# Patient Record
Sex: MALE | Race: WHITE | NOT HISPANIC OR LATINO | URBAN - METROPOLITAN AREA
[De-identification: names, ages, dates, MRNs, and addresses within clinical notes are randomized per-mention and may not be internally consistent; named-entity substitution may affect disease eponyms.]

---

## 2018-11-07 ENCOUNTER — INPATIENT (INPATIENT)
Facility: HOSPITAL | Age: 63
LOS: 0 days | Discharge: HOME | End: 2018-11-08
Attending: INTERNAL MEDICINE | Admitting: INTERNAL MEDICINE

## 2018-11-07 VITALS
OXYGEN SATURATION: 95 % | HEART RATE: 82 BPM | DIASTOLIC BLOOD PRESSURE: 101 MMHG | RESPIRATION RATE: 20 BRPM | HEIGHT: 63 IN | TEMPERATURE: 98 F | SYSTOLIC BLOOD PRESSURE: 150 MMHG | WEIGHT: 203.93 LBS

## 2018-11-07 DIAGNOSIS — E11.59 TYPE 2 DIABETES MELLITUS WITH OTHER CIRCULATORY COMPLICATIONS: ICD-10-CM

## 2018-11-07 DIAGNOSIS — Z95.5 PRESENCE OF CORONARY ANGIOPLASTY IMPLANT AND GRAFT: Chronic | ICD-10-CM

## 2018-11-07 DIAGNOSIS — I50.9 HEART FAILURE, UNSPECIFIED: ICD-10-CM

## 2018-11-07 DIAGNOSIS — J44.1 CHRONIC OBSTRUCTIVE PULMONARY DISEASE WITH (ACUTE) EXACERBATION: ICD-10-CM

## 2018-11-07 DIAGNOSIS — I25.10 ATHEROSCLEROTIC HEART DISEASE OF NATIVE CORONARY ARTERY WITHOUT ANGINA PECTORIS: ICD-10-CM

## 2018-11-07 DIAGNOSIS — R06.00 DYSPNEA, UNSPECIFIED: ICD-10-CM

## 2018-11-07 LAB
ALBUMIN SERPL ELPH-MCNC: 4.1 G/DL — SIGNIFICANT CHANGE UP (ref 3.5–5.2)
ALP SERPL-CCNC: 42 U/L — SIGNIFICANT CHANGE UP (ref 30–115)
ALT FLD-CCNC: 39 U/L — SIGNIFICANT CHANGE UP (ref 0–41)
ANION GAP SERPL CALC-SCNC: 8 MMOL/L — SIGNIFICANT CHANGE UP (ref 7–14)
AST SERPL-CCNC: 29 U/L — SIGNIFICANT CHANGE UP (ref 0–41)
BASOPHILS # BLD AUTO: 0.07 K/UL — SIGNIFICANT CHANGE UP (ref 0–0.2)
BASOPHILS NFR BLD AUTO: 0.8 % — SIGNIFICANT CHANGE UP (ref 0–1)
BILIRUB SERPL-MCNC: 0.3 MG/DL — SIGNIFICANT CHANGE UP (ref 0.2–1.2)
BUN SERPL-MCNC: 21 MG/DL — HIGH (ref 10–20)
CALCIUM SERPL-MCNC: 9.4 MG/DL — SIGNIFICANT CHANGE UP (ref 8.5–10.1)
CHLORIDE SERPL-SCNC: 103 MMOL/L — SIGNIFICANT CHANGE UP (ref 98–110)
CO2 SERPL-SCNC: 29 MMOL/L — SIGNIFICANT CHANGE UP (ref 17–32)
CREAT SERPL-MCNC: 1.5 MG/DL — SIGNIFICANT CHANGE UP (ref 0.7–1.5)
EOSINOPHIL # BLD AUTO: 0.26 K/UL — SIGNIFICANT CHANGE UP (ref 0–0.7)
EOSINOPHIL NFR BLD AUTO: 2.9 % — SIGNIFICANT CHANGE UP (ref 0–8)
GLUCOSE BLDC GLUCOMTR-MCNC: 292 MG/DL — HIGH (ref 70–99)
GLUCOSE SERPL-MCNC: 325 MG/DL — HIGH (ref 70–99)
HCT VFR BLD CALC: 44 % — SIGNIFICANT CHANGE UP (ref 42–52)
HGB BLD-MCNC: 14.9 G/DL — SIGNIFICANT CHANGE UP (ref 14–18)
IMM GRANULOCYTES NFR BLD AUTO: 0.8 % — HIGH (ref 0.1–0.3)
LYMPHOCYTES # BLD AUTO: 1.82 K/UL — SIGNIFICANT CHANGE UP (ref 1.2–3.4)
LYMPHOCYTES # BLD AUTO: 20 % — LOW (ref 20.5–51.1)
MCHC RBC-ENTMCNC: 28.9 PG — SIGNIFICANT CHANGE UP (ref 27–31)
MCHC RBC-ENTMCNC: 33.9 G/DL — SIGNIFICANT CHANGE UP (ref 32–37)
MCV RBC AUTO: 85.3 FL — SIGNIFICANT CHANGE UP (ref 80–94)
MONOCYTES # BLD AUTO: 0.47 K/UL — SIGNIFICANT CHANGE UP (ref 0.1–0.6)
MONOCYTES NFR BLD AUTO: 5.2 % — SIGNIFICANT CHANGE UP (ref 1.7–9.3)
NEUTROPHILS # BLD AUTO: 6.42 K/UL — SIGNIFICANT CHANGE UP (ref 1.4–6.5)
NEUTROPHILS NFR BLD AUTO: 70.3 % — SIGNIFICANT CHANGE UP (ref 42.2–75.2)
NRBC # BLD: 0 /100 WBCS — SIGNIFICANT CHANGE UP (ref 0–0)
NT-PROBNP SERPL-SCNC: 786 PG/ML — HIGH (ref 0–300)
PLATELET # BLD AUTO: 157 K/UL — SIGNIFICANT CHANGE UP (ref 130–400)
POTASSIUM SERPL-MCNC: 5.2 MMOL/L — HIGH (ref 3.5–5)
POTASSIUM SERPL-SCNC: 5.2 MMOL/L — HIGH (ref 3.5–5)
PROT SERPL-MCNC: 6.7 G/DL — SIGNIFICANT CHANGE UP (ref 6–8)
RBC # BLD: 5.16 M/UL — SIGNIFICANT CHANGE UP (ref 4.7–6.1)
SODIUM SERPL-SCNC: 140 MMOL/L — SIGNIFICANT CHANGE UP (ref 135–146)
TROPONIN T SERPL-MCNC: 0.01 NG/ML — SIGNIFICANT CHANGE UP
WBC # BLD: 9.11 K/UL — SIGNIFICANT CHANGE UP (ref 4.8–10.8)
WBC # FLD AUTO: 9.11 K/UL — SIGNIFICANT CHANGE UP (ref 4.8–10.8)

## 2018-11-07 RX ORDER — AZITHROMYCIN 500 MG/1
500 TABLET, FILM COATED ORAL EVERY 24 HOURS
Qty: 0 | Refills: 0 | Status: DISCONTINUED | OUTPATIENT
Start: 2018-11-08 | End: 2018-11-08

## 2018-11-07 RX ORDER — TIOTROPIUM BROMIDE 18 UG/1
1 CAPSULE ORAL; RESPIRATORY (INHALATION) DAILY
Qty: 0 | Refills: 0 | Status: DISCONTINUED | OUTPATIENT
Start: 2018-11-07 | End: 2018-11-08

## 2018-11-07 RX ORDER — NICOTINE POLACRILEX 2 MG
1 GUM BUCCAL DAILY
Qty: 0 | Refills: 0 | Status: DISCONTINUED | OUTPATIENT
Start: 2018-11-07 | End: 2018-11-08

## 2018-11-07 RX ORDER — IPRATROPIUM/ALBUTEROL SULFATE 18-103MCG
3 AEROSOL WITH ADAPTER (GRAM) INHALATION ONCE
Qty: 0 | Refills: 0 | Status: COMPLETED | OUTPATIENT
Start: 2018-11-07 | End: 2018-11-07

## 2018-11-07 RX ORDER — IPRATROPIUM/ALBUTEROL SULFATE 18-103MCG
3 AEROSOL WITH ADAPTER (GRAM) INHALATION EVERY 4 HOURS
Qty: 0 | Refills: 0 | Status: DISCONTINUED | OUTPATIENT
Start: 2018-11-07 | End: 2018-11-08

## 2018-11-07 RX ORDER — ZOLPIDEM TARTRATE 10 MG/1
5 TABLET ORAL AT BEDTIME
Qty: 0 | Refills: 0 | Status: DISCONTINUED | OUTPATIENT
Start: 2018-11-07 | End: 2018-11-08

## 2018-11-07 RX ORDER — OXYCODONE AND ACETAMINOPHEN 5; 325 MG/1; MG/1
1 TABLET ORAL EVERY 4 HOURS
Qty: 0 | Refills: 0 | Status: DISCONTINUED | OUTPATIENT
Start: 2018-11-07 | End: 2018-11-08

## 2018-11-07 RX ORDER — METOPROLOL TARTRATE 50 MG
200 TABLET ORAL DAILY
Qty: 0 | Refills: 0 | Status: DISCONTINUED | OUTPATIENT
Start: 2018-11-07 | End: 2018-11-08

## 2018-11-07 RX ORDER — BUDESONIDE AND FORMOTEROL FUMARATE DIHYDRATE 160; 4.5 UG/1; UG/1
2 AEROSOL RESPIRATORY (INHALATION)
Qty: 0 | Refills: 0 | Status: DISCONTINUED | OUTPATIENT
Start: 2018-11-07 | End: 2018-11-08

## 2018-11-07 RX ORDER — LOSARTAN POTASSIUM 100 MG/1
100 TABLET, FILM COATED ORAL DAILY
Qty: 0 | Refills: 0 | Status: DISCONTINUED | OUTPATIENT
Start: 2018-11-07 | End: 2018-11-08

## 2018-11-07 RX ORDER — AZITHROMYCIN 500 MG/1
500 TABLET, FILM COATED ORAL ONCE
Qty: 0 | Refills: 0 | Status: COMPLETED | OUTPATIENT
Start: 2018-11-07 | End: 2018-11-07

## 2018-11-07 RX ORDER — ATORVASTATIN CALCIUM 80 MG/1
40 TABLET, FILM COATED ORAL AT BEDTIME
Qty: 0 | Refills: 0 | Status: DISCONTINUED | OUTPATIENT
Start: 2018-11-07 | End: 2018-11-08

## 2018-11-07 RX ORDER — IPRATROPIUM/ALBUTEROL SULFATE 18-103MCG
3 AEROSOL WITH ADAPTER (GRAM) INHALATION EVERY 6 HOURS
Qty: 0 | Refills: 0 | Status: DISCONTINUED | OUTPATIENT
Start: 2018-11-07 | End: 2018-11-07

## 2018-11-07 RX ORDER — CLOPIDOGREL BISULFATE 75 MG/1
75 TABLET, FILM COATED ORAL DAILY
Qty: 0 | Refills: 0 | Status: DISCONTINUED | OUTPATIENT
Start: 2018-11-07 | End: 2018-11-08

## 2018-11-07 RX ORDER — AZITHROMYCIN 500 MG/1
TABLET, FILM COATED ORAL
Qty: 0 | Refills: 0 | Status: DISCONTINUED | OUTPATIENT
Start: 2018-11-07 | End: 2018-11-08

## 2018-11-07 RX ORDER — FUROSEMIDE 40 MG
40 TABLET ORAL DAILY
Qty: 0 | Refills: 0 | Status: DISCONTINUED | OUTPATIENT
Start: 2018-11-07 | End: 2018-11-08

## 2018-11-07 RX ADMIN — Medication 3 MILLILITER(S): at 17:44

## 2018-11-07 RX ADMIN — Medication 40 MILLIGRAM(S): at 23:35

## 2018-11-07 RX ADMIN — AZITHROMYCIN 255 MILLIGRAM(S): 500 TABLET, FILM COATED ORAL at 19:53

## 2018-11-07 RX ADMIN — Medication 125 MILLIGRAM(S): at 16:46

## 2018-11-07 RX ADMIN — Medication 3 MILLILITER(S): at 15:00

## 2018-11-07 RX ADMIN — ATORVASTATIN CALCIUM 40 MILLIGRAM(S): 80 TABLET, FILM COATED ORAL at 23:34

## 2018-11-07 RX ADMIN — BUDESONIDE AND FORMOTEROL FUMARATE DIHYDRATE 2 PUFF(S): 160; 4.5 AEROSOL RESPIRATORY (INHALATION) at 23:35

## 2018-11-07 RX ADMIN — Medication 3 MILLILITER(S): at 15:35

## 2018-11-07 NOTE — ED PROVIDER NOTE - PROGRESS NOTE DETAILS
Patient resting comfortably, duo neb treatment currently being given. Recheck - patient has completed first duo neb and reports improvement in his dyspnea. On repeat exam he has expiratory wheezes bilaterally at the bases. Additional duo neb and solumedrol ordered. Recheck - Patient is resting comfortably in ED bed, finished second duo neb - reports that he is no longer short of breath. Lungs are clear to auscultation bilaterally with no wheezing, rales, or rhonchi. Labs pending at this time. Recheck - family now at bedside. Patient has increased work of breathing again but can still speak in full sentences. Lungs are clear to auscultation bilaterally. SpO2 ranging from 91-95% on room air. Additional duo neb ordered, O2 placed. MD at bedside. Discussed the pateint's case with Dr. Hdez. He will admit the patient under his care. PA Fellow note reviewed and agree, Patient admitted

## 2018-11-07 NOTE — H&P ADULT - PROBLEM SELECTOR PLAN 3
Lasix 40iv daily  BNP slightly elevated, morbidly obese so likely falsely low. Lungs without crackles

## 2018-11-07 NOTE — H&P ADULT - NSHPSOCIALHISTORY_GEN_ALL_CORE
Soc: Active smoker 1PPD since 1996. Social drinker    Family hx : no known hx of heart failure in family

## 2018-11-07 NOTE — PHARMACOTHERAPY INTERVENTION NOTE - COMMENTS
DR Hdez aware of allergy to sulfa. Patient on lasix from home not allergic. continuation of home meds as per MD

## 2018-11-07 NOTE — ED PROVIDER NOTE - MEDICAL DECISION MAKING DETAILS
63yM CHF active smoker presents with SOB (no CP, no fever, no change in cough). Minimal volume overload on exam.  Sig inc WOB/accessory muscle use.  Pt started wheezing after duoneb, will treat as COPD with steroids and duoneb.  Pt requiring additional nebs to treat recurrent inc WOB, will admit for COPD exacerbation.

## 2018-11-07 NOTE — ED ADULT TRIAGE NOTE - CHIEF COMPLAINT QUOTE
As per patient "I've short of breath for three weeks, it hurts when I breathe in deep, I am coughing and I am wheezing "

## 2018-11-07 NOTE — ED PROVIDER NOTE - PHYSICAL EXAMINATION
General: Well-developed, obese, in no acute distress. Pleasant and interactive throughout the exam.  HEENT: Normocephalic, atraumatic. Mucous membranes moist, oropharynx nonerythematous without exudate. Uvula midline. TM's clear bilaterally.  Neck: supple, no cervical adenopathy.  Cardio: Regular rate and rhythm, S1 and S2 present. No murmurs, rubs, or gallops. 1+ pitting edema to lower extremities bilaterally. 2+ radial pulses bilaterally. No chest wall tenderness.  Resp: Normal effort, speaking in full sentences without difficulty. Clear to auscultation bilaterally without wheezes, rales, or rhonchi.  GI: Normal bowel sounds. Abdomen protuberant, but soft and non-distended. Nontender in all four quadrants.  Skin: Warm, dry, no rashes. Capillary refill <2 seconds.  Neuro: A&Ox3. Speech clear. General: Well-developed, obese, in no acute distress. Pleasant and interactive throughout the exam.  HEENT: Normocephalic, atraumatic. Mucous membranes moist, oropharynx nonerythematous without exudate. TM's clear bilaterally.  Neck: supple, no cervical adenopathy.  Cardio: Regular rate and rhythm, S1 and S2 present. No murmurs, rubs, or gallops. 1+ pitting edema to lower extremities bilaterally. 2+ radial pulses bilaterally. No chest wall tenderness.  Resp: Normal effort, speaking in full sentences without difficulty. Clear to auscultation bilaterally without wheezes, rales, or rhonchi.  GI: Normal bowel sounds. Abdomen protuberant, but soft and non-distended. Nontender in all four quadrants.  Skin: Warm, dry, no rashes. Capillary refill <2 seconds.  Neuro: A&Ox3. Speech clear.

## 2018-11-07 NOTE — ED PROVIDER NOTE - NS ED ROS FT
Constitutional: (-) fevers, (-) chills, (-) fatigue, (-) unintentional weight loss, (-) appetite change  ENT: (+) congestion, (-) ear pain, (-) rhinorrhea, (-) sore throat  Neck: (-) neck pain, (-) lymphadenopthy  Cardio: (+) chest pain, (-) palpitations, (-) edema  Resp: (+) cough, (+) sputum, (+) shortness of breath, (+) wheezing, (-) hemoptysis  GI: (-) nausea/vomiting, (-) diarrhea/constipation, (-) abdominal pain  MSK: (-) back pain, (-) joint pain, (-) joint swelling, (-) joint stiffness  Skin: (-) rashes, (-) wounds  Neuro: (-) headache, (-) syncope, (-) numbness, (-) weakness Constitutional: (-) fevers, (-) chills, (-) fatigue, (-) unintentional weight loss, (-) appetite change  ENT: (+) congestion, (-) ear pain, (-) rhinorrhea, (-) sore throat  Neck: (-) neck pain, (-) lymphadenopthy  Cardio: (+) chest pain, (-) palpitations, (-) edema  Resp: (+) cough, (+) sputum, (+) shortness of breath, (+) wheezing, (-) hemoptysis  GI: (-) nausea/vomiting, (-) diarrhea/constipation, (-) abdominal pain  : (-) dysuria, (-) frequency  MSK: (-) back pain, (-) joint pain, (-) joint swelling, (-) joint stiffness  Skin: (-) rashes, (-) wounds  Neuro: (-) headache, (-) syncope, (-) numbness, (-) weakness

## 2018-11-07 NOTE — ED PROVIDER NOTE - CARE PLAN
Principal Discharge DX:	Dyspnea  Secondary Diagnosis:	CHF (congestive heart failure) Principal Discharge DX:	Dyspnea  Secondary Diagnosis:	COPD exacerbation

## 2018-11-07 NOTE — H&P ADULT - NSHPLABSRESULTS_GEN_ALL_CORE
T(F): , Max: 97.6 (11-07-18 @ 14:27)  HR: 82 (11-07-18 @ 14:27) (82 - 82)  BP: 150/101 (11-07-18 @ 14:27)  RR: 20 (11-07-18 @ 14:27)  SpO2: 95% (11-07-18 @ 14:27)92.5  General: No apparent distress  Cardiovascular: S1, S2  Gastrointestinal: Soft, Non-tender, Non-distended, obese  Respiratory: Minimal wheeze. No crackles noted  Musculoskeletal: Moves all extremities  Lymphatic: No edema  Neurologic: No gross motor deficit  Dermatologic: Skin dry

## 2018-11-07 NOTE — ED PROVIDER NOTE - ATTENDING CONTRIBUTION TO CARE
This is a 63yM active smoker PTSD hx ?CHF who presents for worsened SOB (now persistent compared to intermittent at baseline). No fever, no change in cough, no sig inc edema.  Pt tachypneic, working to breathe on arrival.  Denies CP.    VS HR 82 /101 O2 sat 95% RA  CONSTITUTIONAL: well developed; well nourished; well appearing in no acute distress  HEAD: normocephalic; atraumatic  EYES: no conjunctival injection, no scleral icterus  ENT: no nasal discharge; airway clear.  NECK: supple; non tender. + full passive ROM in all directions  CARD: S1, S2 normal; no murmurs, gallops, or rubs. Regular rate and rhythm  RESP: b/l breath sounds w/ accessory muscle use, no sig wheezing  ABD: soft; non-distended; non-tender. No rebound, no guarding, no pulsatile abdominal mass  EXT: moving all extremities spontaneously, normal ROM. No clubbing, cyanosis or edema  SKIN: warm and dry, no lesions noted  NEURO: alert, oriented, CN II-XII grossly intact, motor and sensory grossly intact, speech nonslurred, no focal deficits. GCS 15  PSYCH: calm, cooperative, appropriate, good eye contact, logical thought process, no apparent danger to self or others    EKG  labs  CXR  CHF vs likely COPD --> duoneb w/ improved WOB, now wheezing, will treat as COPD, give steroids    on repeat assessment ~1-2 hours, pt feeling much better but starting to get more SOB.  Giving more nebs, will need admission (especially as he does not regularly follow with a PCP and family is concerned that he will not follow up at all).

## 2018-11-07 NOTE — H&P ADULT - HISTORY OF PRESENT ILLNESS
62 y/o F P/W 3-4 weeks of SOB and LESLIE episodic but progressively worsening. Pt states at baseline on a good day, he can walk up a few steps before getting SOB and having to rest. Pt states that the last few days he has been SOB at rest. Pt states this is how he felt before each of his "13 stents". Denies fevers or chills. C/O chronic cough, unchanged but does admit to new nasal discharge nasal fullness worsenend when laying down to sleep. Does not have pulmonologist. Cardiology Dr Aguilar.

## 2018-11-07 NOTE — ED PROVIDER NOTE - OBJECTIVE STATEMENT
63 year old male with a history of CHF, DM, CAD with 13 stents presents to the ED with dyspnea. Patient states that he has been experiencing increasing shortness of breath for the last 3 weeks, which became notably worse today when he was trying to take a nap. He states that he felt so short of breath he was unable to lay flat. Patient also reports intermittent sharp mid-chest pain and productive cough with his shortness of breath. He reports taking Mucinex with no relief of his symptoms. Denies fevers, chills, recent travel, back pain, abdominal pain, N/V, leg swelling, syncope, diaphoresis, or weakness. Patient states that he is currently in-between primary care providers. 63 year old male with a history of CHF, DM, CAD with 13 stents presents to the ED with dyspnea. Patient states that he has been experiencing increasing shortness of breath for the last 3 weeks, which became notably worse today when he was trying to take a nap. He states that he felt so short of breath he was unable to lay flat. Patient also reports intermittent sharp mid-chest pain and productive cough with his shortness of breath. He reports taking Mucinex with no relief of his symptoms. Denies fevers, chills, recent travel or illness, back pain, abdominal pain, N/V, leg swelling, syncope, diaphoresis, or weakness. Patient states that he is currently in-between primary care providers. He denies a history of MI or COPD.

## 2018-11-07 NOTE — ED ADULT NURSE NOTE - NSIMPLEMENTINTERV_GEN_ALL_ED
Implemented All Universal Safety Interventions:  [place] to call system. Call bell, personal items and telephone within reach. Instruct patient to call for assistance. Room bathroom lighting operational. Non-slip footwear when patient is off stretcher. Physically safe environment: no spills, clutter or unnecessary equipment. Stretcher in lowest position, wheels locked, appropriate side rails in place.

## 2018-11-08 VITALS
SYSTOLIC BLOOD PRESSURE: 133 MMHG | HEART RATE: 84 BPM | TEMPERATURE: 97 F | RESPIRATION RATE: 18 BRPM | DIASTOLIC BLOOD PRESSURE: 57 MMHG

## 2018-11-08 LAB
ACETONE SERPL-MCNC: NEGATIVE — SIGNIFICANT CHANGE UP
ALBUMIN SERPL ELPH-MCNC: 4.4 G/DL — SIGNIFICANT CHANGE UP (ref 3.5–5.2)
ALP SERPL-CCNC: 44 U/L — SIGNIFICANT CHANGE UP (ref 30–115)
ALT FLD-CCNC: 37 U/L — SIGNIFICANT CHANGE UP (ref 0–41)
ANION GAP SERPL CALC-SCNC: 20 MMOL/L — HIGH (ref 7–14)
ANION GAP SERPL CALC-SCNC: 22 MMOL/L — HIGH (ref 7–14)
AST SERPL-CCNC: 25 U/L — SIGNIFICANT CHANGE UP (ref 0–41)
BILIRUB SERPL-MCNC: 0.5 MG/DL — SIGNIFICANT CHANGE UP (ref 0.2–1.2)
BUN SERPL-MCNC: 26 MG/DL — HIGH (ref 10–20)
BUN SERPL-MCNC: 31 MG/DL — HIGH (ref 10–20)
CALCIUM SERPL-MCNC: 9.2 MG/DL — SIGNIFICANT CHANGE UP (ref 8.5–10.1)
CALCIUM SERPL-MCNC: 9.5 MG/DL — SIGNIFICANT CHANGE UP (ref 8.5–10.1)
CHLORIDE SERPL-SCNC: 100 MMOL/L — SIGNIFICANT CHANGE UP (ref 98–110)
CHLORIDE SERPL-SCNC: 96 MMOL/L — LOW (ref 98–110)
CO2 SERPL-SCNC: 21 MMOL/L — SIGNIFICANT CHANGE UP (ref 17–32)
CO2 SERPL-SCNC: 21 MMOL/L — SIGNIFICANT CHANGE UP (ref 17–32)
CREAT SERPL-MCNC: 1.4 MG/DL — SIGNIFICANT CHANGE UP (ref 0.7–1.5)
CREAT SERPL-MCNC: 1.9 MG/DL — HIGH (ref 0.7–1.5)
ESTIMATED AVERAGE GLUCOSE: 192 MG/DL — HIGH (ref 68–114)
GLUCOSE BLDC GLUCOMTR-MCNC: 359 MG/DL — HIGH (ref 70–99)
GLUCOSE BLDC GLUCOMTR-MCNC: 387 MG/DL — HIGH (ref 70–99)
GLUCOSE SERPL-MCNC: 241 MG/DL — HIGH (ref 70–99)
GLUCOSE SERPL-MCNC: 430 MG/DL — HIGH (ref 70–99)
HBA1C BLD-MCNC: 8.3 % — HIGH (ref 4–5.6)
HCT VFR BLD CALC: 45.8 % — SIGNIFICANT CHANGE UP (ref 42–52)
HGB BLD-MCNC: 15.4 G/DL — SIGNIFICANT CHANGE UP (ref 14–18)
LACTATE SERPL-SCNC: 4.2 MMOL/L — CRITICAL HIGH (ref 0.5–2.2)
MCHC RBC-ENTMCNC: 28.8 PG — SIGNIFICANT CHANGE UP (ref 27–31)
MCHC RBC-ENTMCNC: 33.6 G/DL — SIGNIFICANT CHANGE UP (ref 32–37)
MCV RBC AUTO: 85.8 FL — SIGNIFICANT CHANGE UP (ref 80–94)
NRBC # BLD: 0 /100 WBCS — SIGNIFICANT CHANGE UP (ref 0–0)
PLATELET # BLD AUTO: 192 K/UL — SIGNIFICANT CHANGE UP (ref 130–400)
POTASSIUM SERPL-MCNC: 4.8 MMOL/L — SIGNIFICANT CHANGE UP (ref 3.5–5)
POTASSIUM SERPL-MCNC: 5.1 MMOL/L — HIGH (ref 3.5–5)
POTASSIUM SERPL-SCNC: 4.8 MMOL/L — SIGNIFICANT CHANGE UP (ref 3.5–5)
POTASSIUM SERPL-SCNC: 5.1 MMOL/L — HIGH (ref 3.5–5)
PROT SERPL-MCNC: 6.8 G/DL — SIGNIFICANT CHANGE UP (ref 6–8)
RBC # BLD: 5.34 M/UL — SIGNIFICANT CHANGE UP (ref 4.7–6.1)
RBC # FLD: 12.9 % — SIGNIFICANT CHANGE UP (ref 11.5–14.5)
SODIUM SERPL-SCNC: 137 MMOL/L — SIGNIFICANT CHANGE UP (ref 135–146)
SODIUM SERPL-SCNC: 143 MMOL/L — SIGNIFICANT CHANGE UP (ref 135–146)
TROPONIN T SERPL-MCNC: <0.01 NG/ML — SIGNIFICANT CHANGE UP
WBC # BLD: 15.75 K/UL — HIGH (ref 4.8–10.8)
WBC # FLD AUTO: 15.75 K/UL — HIGH (ref 4.8–10.8)

## 2018-11-08 RX ORDER — DEXTROSE 50 % IN WATER 50 %
12.5 SYRINGE (ML) INTRAVENOUS ONCE
Qty: 0 | Refills: 0 | Status: DISCONTINUED | OUTPATIENT
Start: 2018-11-08 | End: 2018-11-08

## 2018-11-08 RX ORDER — GLUCAGON INJECTION, SOLUTION 0.5 MG/.1ML
1 INJECTION, SOLUTION SUBCUTANEOUS ONCE
Qty: 0 | Refills: 0 | Status: DISCONTINUED | OUTPATIENT
Start: 2018-11-08 | End: 2018-11-08

## 2018-11-08 RX ORDER — SODIUM CHLORIDE 9 MG/ML
1000 INJECTION INTRAMUSCULAR; INTRAVENOUS; SUBCUTANEOUS
Qty: 0 | Refills: 0 | Status: DISCONTINUED | OUTPATIENT
Start: 2018-11-08 | End: 2018-11-08

## 2018-11-08 RX ORDER — INSULIN LISPRO 100/ML
VIAL (ML) SUBCUTANEOUS
Qty: 0 | Refills: 0 | Status: DISCONTINUED | OUTPATIENT
Start: 2018-11-08 | End: 2018-11-08

## 2018-11-08 RX ORDER — SODIUM CHLORIDE 9 MG/ML
1000 INJECTION, SOLUTION INTRAVENOUS
Qty: 0 | Refills: 0 | Status: DISCONTINUED | OUTPATIENT
Start: 2018-11-08 | End: 2018-11-08

## 2018-11-08 RX ORDER — DEXTROSE 50 % IN WATER 50 %
25 SYRINGE (ML) INTRAVENOUS ONCE
Qty: 0 | Refills: 0 | Status: DISCONTINUED | OUTPATIENT
Start: 2018-11-08 | End: 2018-11-08

## 2018-11-08 RX ORDER — DEXTROSE 50 % IN WATER 50 %
15 SYRINGE (ML) INTRAVENOUS ONCE
Qty: 0 | Refills: 0 | Status: DISCONTINUED | OUTPATIENT
Start: 2018-11-08 | End: 2018-11-08

## 2018-11-08 RX ORDER — SODIUM CHLORIDE 9 MG/ML
1000 INJECTION INTRAMUSCULAR; INTRAVENOUS; SUBCUTANEOUS ONCE
Qty: 0 | Refills: 0 | Status: COMPLETED | OUTPATIENT
Start: 2018-11-08 | End: 2018-11-08

## 2018-11-08 RX ORDER — INSULIN LISPRO 100/ML
10 VIAL (ML) SUBCUTANEOUS ONCE
Qty: 0 | Refills: 0 | Status: COMPLETED | OUTPATIENT
Start: 2018-11-08 | End: 2018-11-08

## 2018-11-08 RX ORDER — HEPARIN SODIUM 5000 [USP'U]/ML
5000 INJECTION INTRAVENOUS; SUBCUTANEOUS EVERY 12 HOURS
Qty: 0 | Refills: 0 | Status: DISCONTINUED | OUTPATIENT
Start: 2018-11-08 | End: 2018-11-08

## 2018-11-08 RX ADMIN — Medication 3 MILLILITER(S): at 08:12

## 2018-11-08 RX ADMIN — Medication 40 MILLIGRAM(S): at 05:15

## 2018-11-08 RX ADMIN — CLOPIDOGREL BISULFATE 75 MILLIGRAM(S): 75 TABLET, FILM COATED ORAL at 13:50

## 2018-11-08 RX ADMIN — SODIUM CHLORIDE 1000 MILLILITER(S): 9 INJECTION INTRAMUSCULAR; INTRAVENOUS; SUBCUTANEOUS at 14:15

## 2018-11-08 RX ADMIN — Medication 200 MILLIGRAM(S): at 05:14

## 2018-11-08 RX ADMIN — SODIUM CHLORIDE 100 MILLILITER(S): 9 INJECTION INTRAMUSCULAR; INTRAVENOUS; SUBCUTANEOUS at 14:15

## 2018-11-08 RX ADMIN — TIOTROPIUM BROMIDE 1 CAPSULE(S): 18 CAPSULE ORAL; RESPIRATORY (INHALATION) at 08:13

## 2018-11-08 RX ADMIN — BUDESONIDE AND FORMOTEROL FUMARATE DIHYDRATE 2 PUFF(S): 160; 4.5 AEROSOL RESPIRATORY (INHALATION) at 08:13

## 2018-11-08 RX ADMIN — Medication 10: at 13:49

## 2018-11-08 RX ADMIN — Medication 3 MILLILITER(S): at 06:01

## 2018-11-08 RX ADMIN — Medication 10 UNIT(S): at 11:00

## 2018-11-08 RX ADMIN — Medication 40 MILLIGRAM(S): at 05:14

## 2018-11-08 RX ADMIN — LOSARTAN POTASSIUM 100 MILLIGRAM(S): 100 TABLET, FILM COATED ORAL at 05:14

## 2018-11-08 NOTE — PROGRESS NOTE ADULT - ASSESSMENT
63 year old male with a history of DM, COPD, active smoker, CAD with 13 stents presents to the ED with dyspnea. Patient states that he has been experiencing increasing shortness of breath for the last 3 weeks, which became notably worse today when he was trying to take a nap. He states that he felt so short of breath he was unable to lay flat. Patient also reports intermittent sharp mid-chest pain and productive cough with his shortness of breath. He reports taking Mucinex with no relief of his symptoms. Denies fevers, chills, recent travel or illness, back pain, abdominal pain, N/V, leg swelling, syncope, diaphoresis, or weakness. Patient states that he is currently in-between primary care providers. He denies a history of MI.     COPD Exacerbation  - taper steroids   - c/w nebs ATC  - albuterol PRN       DMII  - monitor fingersticks     KAREN  Lactic acidosis   AGMA  - due to lasix   - c/w IVF     CAD s/p PCI   - c/w ASA/Statin     Active smoker   - smoking cessation   - nicotine replacement     dvt ppx

## 2018-11-08 NOTE — CONSULT NOTE ADULT - ASSESSMENT
Patient claims 13 stents. Last about 3 years ago. No overt chf now by exam . This may be exacerbation of copd. Check cxr echo. Consider when stable out patient stress echo. Stop smoking echo

## 2018-11-08 NOTE — CONSULT NOTE ADULT - SUBJECTIVE AND OBJECTIVE BOX
CARDIOLOGY CONSULT NOTE     CHIEF COMPLAINT/REASON FOR CONSULT:    HPI:  62 y/o F P/W 3-4 weeks of SOB and LESLIE episodic but progressively worsening. Pt states at baseline on a good day, he can walk up a few steps before getting SOB and having to rest. Pt states that the last few days he has been SOB at rest. Pt states this is how he felt before each of his "13 stents". Denies fevers or chills. C/O chronic cough, unchanged but does admit to new nasal discharge nasal fullness worsenend when laying down to sleep. Does not have pulmonologist. Cardiology Dr Aguilar. (07 Nov 2018 18:05      PAST MEDICAL & SURGICAL HISTORY:  CAD (coronary artery disease)  Diabetes  CHF (congestive heart failure)  H/O heart artery stent      Cardiac Risks:   [ ]HTN, [ x] DM, [x ] Smoking, [ ] FH,  [ ] Lipids        MEDICATIONS:  MEDICATIONS  (STANDING):  ALBUTerol/ipratropium for Nebulization 3 milliLiter(s) Nebulizer every 4 hours  atorvastatin 40 milliGRAM(s) Oral at bedtime  azithromycin  IVPB      azithromycin  IVPB 500 milliGRAM(s) IV Intermittent every 24 hours  buDESOnide 160 MICROgram(s)/formoterol 4.5 MICROgram(s) Inhaler 2 Puff(s) Inhalation two times a day  clopidogrel Tablet 75 milliGRAM(s) Oral daily  dextrose 5%. 1000 milliLiter(s) (50 mL/Hr) IV Continuous <Continuous>  dextrose 50% Injectable 12.5 Gram(s) IV Push once  dextrose 50% Injectable 25 Gram(s) IV Push once  dextrose 50% Injectable 25 Gram(s) IV Push once  furosemide   Injectable 40 milliGRAM(s) IV Push daily  heparin  Injectable 5000 Unit(s) SubCutaneous every 12 hours  insulin lispro (HumaLOG) corrective regimen sliding scale   SubCutaneous three times a day before meals  methylPREDNISolone sodium succinate Injectable 40 milliGRAM(s) IV Push every 8 hours  metoprolol succinate  milliGRAM(s) Oral daily  nicotine - 21 mG/24Hr(s) Patch 1 patch Transdermal daily  tiotropium 18 MICROgram(s) Capsule 1 Capsule(s) Inhalation daily  zolpidem 5 milliGRAM(s) Oral at bedtime      FAMILY HISTORY:      SOCIAL HISTORY:      [ ] Marital status getting seperated  Allergies    sulfa drugs (Unknown)      	    REVIEW OF SYSTEMS:  CONSTITUTIONAL: No fever, weight loss, or fatigue  EYES: No eye pain, visual disturbances, or discharge  ENMT:  No difficulty hearing, tinnitus, vertigo; No sinus or throat pain  NECK: No pain or stiffness  RESPIRATORY: No cough, wheezing, chills or hemoptysis; sob  CARDIOVASCULAR: No chest pain, palpitations, passing out, dizziness, or leg swelling  GASTROINTESTINAL: No abdominal or epigastric pain. No nausea, vomiting, or hematemesis; No diarrhea or constipation. No melena or hematochezia.  GENITOURINARY: No dysuria, frequency, hematuria, or incontinence  NEUROLOGICAL: No headaches, memory loss, loss of strength, numbness, or tremors  SKIN: No itching, burning, rashes, or lesions     PHYSICAL EXAM:  T(C): 36 (11-08-18 @ 05:27), Max: 36.7 (11-07-18 @ 20:16)  HR: 82 (11-08-18 @ 05:27) (64 - 82)  BP: 127/65 (11-08-18 @ 05:27) (120/74 - 150/101)  RR: 18 (11-08-18 @ 05:27) (18 - 20)  SpO2: 96% (11-08-18 @ 09:44) (95% - 98%)  Wt(kg): --  I&O's Summary      Appearance: Normal	  Psychiatry: A & O x 3, Mood & affect appropriate  HEENT:   Normal oral mucosa, PERRL, EOMI	  Lymphatic: No lymphadenopathy  Cardiovascular: Normal S1 S2,RRR, No JVD, No murmurs  Respiratory: Lungs clear to auscultation	decreased bs  Gastrointestinal:  Soft, Non-tender, + BS	  Skin: No rashes, No ecchymoses, No cyanosis	  Neurologic: Non-focal  Extremities: Normal range of motion, No clubbing, cyanosis or edema  Vascular: Peripheral pulses palpable 2+ bilaterally      ECG:  	nsr 1 degree av block lbbb    	  LABS:	 	    CARDIAC MARKERS:          Serum Pro-Brain Natriuretic Peptide: 786 pg/mL (11-07 @ 15:38)                            15.4   15.75 )-----------( 192      ( 08 Nov 2018 06:37 )             45.8     11-08    143  |  100  |  26<H>  ----------------------------<  241<H>  5.1<H>   |  21  |  1.4    Ca    9.5      08 Nov 2018 06:37    TPro  6.8  /  Alb  4.4  /  TBili  0.5  /  DBili  x   /  AST  25  /  ALT  37  /  AlkPhos  44  11-08      proBNP: Serum Pro-Brain Natriuretic Peptide: 786 pg/mL (11-07 @ 15:38)

## 2018-11-08 NOTE — PROGRESS NOTE ADULT - SUBJECTIVE AND OBJECTIVE BOX
Brief Summarry:    Pt seen and examined at bedside.    VITAL SIGNS (Last 24 hrs):  T(C): 36.2 (18 @ 14:51), Max: 36.7 (18 @ 20:16)  HR: 84 (18 @ 14:51) (64 - 84)  BP: 133/57 (18 @ 14:51) (120/74 - 133/57)  RR: 18 (18 @ 14:51) (18 - 18)  SpO2: 96% (18 @ 09:44) (96% - 98%)  Wt(kg): --  Daily Height in cm: 160.02 (2018 22:28)    Daily Weight in k.7 (2018 05:27)    I&O's Summary      PHYSICAL EXAM:  GENERAL: NAD, well-developed  HEAD:  Atraumatic, Normocephalic  EYES: EOMI, PERRLA, conjunctiva and sclera clear  NECK: Supple, No JVD  CHEST/LUNG: Clear to auscultation bilaterally; No wheeze  HEART: Regular rate and rhythm; No murmurs, rubs, or gallops  ABDOMEN: Soft, Nontender, Nondistended; Bowel sounds present  EXTREMITIES:  2+ Peripheral Pulses, No clubbing, cyanosis, or edema  PSYCH: AAOx3  NEUROLOGY: non-focal  SKIN: No rashes or lesions    Labs Reviewed  Spoke to patient in regards to abnormal labs.    CBC Full  -  ( 2018 06:37 )  WBC Count : 15.75 K/uL  Hemoglobin : 15.4 g/dL  Hematocrit : 45.8 %  Platelet Count - Automated : 192 K/uL  Mean Cell Volume : 85.8 fL  Mean Cell Hemoglobin : 28.8 pg  Mean Cell Hemoglobin Concentration : 33.6 g/dL  Auto Neutrophil # : x  Auto Lymphocyte # : x  Auto Monocyte # : x  Auto Eosinophil # : x  Auto Basophil # : x  Auto Neutrophil % : x  Auto Lymphocyte % : x  Auto Monocyte % : x  Auto Eosinophil % : x  Auto Basophil % : x    BMP:    08 @ 12:25    Blood Urea Nitrogen - 31  Calcium - 9.2  Carbond Dioxide - 21  Chloride - 96  Creatinine - 1.9  Glucose - 430  Potassium - 4.8  Sodium - 137            MEDICATIONS  (STANDING):  ALBUTerol/ipratropium for Nebulization 3 milliLiter(s) Nebulizer every 4 hours  atorvastatin 40 milliGRAM(s) Oral at bedtime  azithromycin  IVPB      azithromycin  IVPB 500 milliGRAM(s) IV Intermittent every 24 hours  buDESOnide 160 MICROgram(s)/formoterol 4.5 MICROgram(s) Inhaler 2 Puff(s) Inhalation two times a day  clopidogrel Tablet 75 milliGRAM(s) Oral daily  dextrose 5%. 1000 milliLiter(s) (50 mL/Hr) IV Continuous <Continuous>  dextrose 50% Injectable 12.5 Gram(s) IV Push once  dextrose 50% Injectable 25 Gram(s) IV Push once  dextrose 50% Injectable 25 Gram(s) IV Push once  heparin  Injectable 5000 Unit(s) SubCutaneous every 12 hours  insulin lispro (HumaLOG) corrective regimen sliding scale   SubCutaneous three times a day before meals  metoprolol succinate  milliGRAM(s) Oral daily  nicotine - 21 mG/24Hr(s) Patch 1 patch Transdermal daily  sodium chloride 0.9%. 1000 milliLiter(s) (100 mL/Hr) IV Continuous <Continuous>  tiotropium 18 MICROgram(s) Capsule 1 Capsule(s) Inhalation daily  zolpidem 5 milliGRAM(s) Oral at bedtime    MEDICATIONS  (PRN):  dextrose 40% Gel 15 Gram(s) Oral once PRN Blood Glucose LESS THAN 70 milliGRAM(s)/deciliter  glucagon  Injectable 1 milliGRAM(s) IntraMuscular once PRN Glucose LESS THAN 70 milligrams/deciliter  oxyCODONE    5 mG/acetaminophen 325 mG 1 Tablet(s) Oral every 4 hours PRN Moderate Pain (4 - 6)

## 2018-11-13 DIAGNOSIS — N17.9 ACUTE KIDNEY FAILURE, UNSPECIFIED: ICD-10-CM

## 2018-11-13 DIAGNOSIS — I25.10 ATHEROSCLEROTIC HEART DISEASE OF NATIVE CORONARY ARTERY WITHOUT ANGINA PECTORIS: ICD-10-CM

## 2018-11-13 DIAGNOSIS — J44.1 CHRONIC OBSTRUCTIVE PULMONARY DISEASE WITH (ACUTE) EXACERBATION: ICD-10-CM

## 2018-11-13 DIAGNOSIS — I11.0 HYPERTENSIVE HEART DISEASE WITH HEART FAILURE: ICD-10-CM

## 2018-11-13 DIAGNOSIS — Z79.4 LONG TERM (CURRENT) USE OF INSULIN: ICD-10-CM

## 2018-11-13 DIAGNOSIS — Z95.5 PRESENCE OF CORONARY ANGIOPLASTY IMPLANT AND GRAFT: ICD-10-CM

## 2018-11-13 DIAGNOSIS — Z88.2 ALLERGY STATUS TO SULFONAMIDES: ICD-10-CM

## 2018-11-13 DIAGNOSIS — R06.02 SHORTNESS OF BREATH: ICD-10-CM

## 2018-11-13 DIAGNOSIS — F17.210 NICOTINE DEPENDENCE, CIGARETTES, UNCOMPLICATED: ICD-10-CM

## 2018-11-13 DIAGNOSIS — E11.59 TYPE 2 DIABETES MELLITUS WITH OTHER CIRCULATORY COMPLICATIONS: ICD-10-CM

## 2018-11-13 DIAGNOSIS — E66.9 OBESITY, UNSPECIFIED: ICD-10-CM

## 2018-11-13 DIAGNOSIS — Z28.21 IMMUNIZATION NOT CARRIED OUT BECAUSE OF PATIENT REFUSAL: ICD-10-CM

## 2018-11-13 DIAGNOSIS — E87.2 ACIDOSIS: ICD-10-CM

## 2018-11-13 DIAGNOSIS — I50.9 HEART FAILURE, UNSPECIFIED: ICD-10-CM

## 2019-09-11 ENCOUNTER — INPATIENT (INPATIENT)
Facility: HOSPITAL | Age: 64
LOS: 1 days | Discharge: LEFT BEFORE DISCHARGE | End: 2019-09-13
Attending: INTERNAL MEDICINE | Admitting: INTERNAL MEDICINE
Payer: MEDICARE

## 2019-09-11 VITALS
OXYGEN SATURATION: 95 % | TEMPERATURE: 98 F | DIASTOLIC BLOOD PRESSURE: 80 MMHG | WEIGHT: 207.01 LBS | SYSTOLIC BLOOD PRESSURE: 165 MMHG | RESPIRATION RATE: 22 BRPM | HEART RATE: 102 BPM | HEIGHT: 63 IN

## 2019-09-11 DIAGNOSIS — Z95.5 PRESENCE OF CORONARY ANGIOPLASTY IMPLANT AND GRAFT: Chronic | ICD-10-CM

## 2019-09-11 PROCEDURE — 93010 ELECTROCARDIOGRAM REPORT: CPT

## 2019-09-11 PROCEDURE — 99285 EMERGENCY DEPT VISIT HI MDM: CPT

## 2019-09-11 PROCEDURE — 93010 ELECTROCARDIOGRAM REPORT: CPT | Mod: 77

## 2019-09-11 RX ORDER — IPRATROPIUM/ALBUTEROL SULFATE 18-103MCG
3 AEROSOL WITH ADAPTER (GRAM) INHALATION ONCE
Refills: 0 | Status: DISCONTINUED | OUTPATIENT
Start: 2019-09-11 | End: 2019-09-11

## 2019-09-11 NOTE — ED PROVIDER NOTE - OBJECTIVE STATEMENT
64M pmhx COPD, CAD w/ stent, HF, HTN, p/w cough and clear sputum that started today. Pt has associated central chest tightness, that doesn't radiate, 5/10. intermittent. Pt is speaking in full sentences.

## 2019-09-11 NOTE — ED PROVIDER NOTE - CLINICAL SUMMARY MEDICAL DECISION MAKING FREE TEXT BOX
64M pmhx COPD, CAD w/ stent, HF, HTN, p/w cough and clear sputum that started today. Vitals wnl. Physical- noted to have bilat crackels. Previous records obtained and reviewed, noted to have HFrEF. EKG ordered, documented above. For imaging we ordered a CXR, and my preliminary read did reveal cmp. Labs ordered and noted to have elevated trop. We treated the patient with lasix for diuresis.  After the workup the decision was made admit the patient for diuresis and high risk chest pressure. Extensive discussion had with the patient.

## 2019-09-11 NOTE — ED ADULT TRIAGE NOTE - SPO2 (%)
[de-identified] : \par 55 year-old male with questionable cervical radiculopathy versus shoulder pathology. He has pain radiating distally with numbness and tingling however has pain with shoulder motion impingement signs and weakness with rotator cuff testing. He is tried anti-inflammatory medications and steroid dose pack. We'll obtain an MRI of his right shoulder to further evaluate will followup after MRI. All questions answered\par \par  95

## 2019-09-11 NOTE — ED PROVIDER NOTE - CARE PLAN
Principal Discharge DX:	Chest pressure  Secondary Diagnosis:	Elevated troponin  Secondary Diagnosis:	CHF exacerbation

## 2019-09-11 NOTE — ED PROVIDER NOTE - NS ED ROS FT
Constitutional:  No behavior changes, fevers/chills.    Head: No injury, headache, LOC, dizziness/LH.    Eyes:  No visual changes, eye pain, redness, or discharge; no injury; no foreign body sensation.    ENMT:  No ear pain or discharge, hearing problems; no pain or injuries to the nose, ears, mouth, or throat.    Neck:  No pain, stiffness, injury; no loss of range of motion.    Cardiac: No chest pain, palpitations, diaphoresis.    Chest/respiratory: (+) cough, shortness of breath, chest tightness, or trouble breathing    GI: No nausea, vomiting, diarrhea or abdominal pain; no injuries. No changes in PO intake. No melena/brbpr.    :  No dysuria, hematuria, urgency, or injuries. No changes in urine output. No flanl     MS: No pain, weakness, injury, numbness or tingling; no loss of range of motion. No edema. No calf pain/swelling/erythema.    Back:  No pain or injury.    Skin:  No rashes or color changes; no lacerations or abrasions.  Social: No sick contacts, recent travel or rash. Constitutional:  No behavior changes, fevers/chills.  Head: No injury, headache, LOC, dizziness/LH.  Eyes:  No visual changes, eye pain, redness, or discharge; no injury; no foreign body sensation.  ENMT:  No ear pain or discharge, hearing problems; no pain or injuries to the nose, ears, mouth, or throat.  Neck:  No pain, stiffness, injury; no loss of range of motion.  Cardiac: No chest pain, palpitations, diaphoresis.  Chest/respiratory: (+) cough, shortness of breath, chest tightness, or trouble breathing  GI: No nausea, vomiting, diarrhea or abdominal pain; no injuries. No changes in PO intake. No melena/brbpr.  :  No dysuria, hematuria, urgency, or injuries. No changes in urine output. N  MS: No pain, weakness, injury, numbness or tingling; no loss of range of motion. No edema. No calf pain/swelling/erythema.    Back:  No pain or injury.    Skin:  No rashes or color changes; no lacerations or abrasions.  Social: No sick contacts, recent travel or rash.

## 2019-09-11 NOTE — ED PROVIDER NOTE - PHYSICAL EXAMINATION
General: Awake, alert, No acute distress  Eyes: PERRL, EOMI, no icterus, lids and conjunctivae are normal  ENT: External inspection normal, pink/moist membranes, pharynx normal, no pharyngeal erythema/exudate  CV: S1S2, regular rate and rhythm, no murmur/gallops/rubs, no JVD, 2+ pulses b/l, no edema, warm/well-perfused  Respiratory: Normal respiratory rate/effort, no respiratory distress, (+) rhonchi bilat, normal voice, speaking full sentences, no retractions, no stridor  Abdomen: (+) midline surgical scare, Soft abdomen, no tender/distended/guarding/rebound, no CVA tenderness  Musculoskeletal: FROM all 4 extremities, N/V intact, pelvis stable, no TLS spinal tender/deform/step-offs, no kayla tender/deform, stable gait  Neck: FROM neck, supple, no meningismus, trachea midline, (+) JVD, no cspine tender/step-offs, no lymphadenopathy  Integumentary: Color normal for race, warm and dry, no rash  Neuro: Oriented x3, CN 2-12 grossly intact, normal motor, normal sensory, normal gait, normal cerebellar  Psych: Oriented x3, mood normal, affect normal

## 2019-09-12 DIAGNOSIS — Z95.2 PRESENCE OF PROSTHETIC HEART VALVE: Chronic | ICD-10-CM

## 2019-09-12 PROBLEM — E11.9 TYPE 2 DIABETES MELLITUS WITHOUT COMPLICATIONS: Chronic | Status: ACTIVE | Noted: 2018-11-07

## 2019-09-12 PROBLEM — I25.10 ATHEROSCLEROTIC HEART DISEASE OF NATIVE CORONARY ARTERY WITHOUT ANGINA PECTORIS: Chronic | Status: ACTIVE | Noted: 2018-11-07

## 2019-09-12 PROBLEM — I50.9 HEART FAILURE, UNSPECIFIED: Chronic | Status: ACTIVE | Noted: 2018-11-07

## 2019-09-12 LAB
ALBUMIN SERPL ELPH-MCNC: 4.1 G/DL — SIGNIFICANT CHANGE UP (ref 3.5–5.2)
ALP SERPL-CCNC: 59 U/L — SIGNIFICANT CHANGE UP (ref 30–115)
ALT FLD-CCNC: 27 U/L — SIGNIFICANT CHANGE UP (ref 0–41)
ANION GAP SERPL CALC-SCNC: 16 MMOL/L — HIGH (ref 7–14)
AST SERPL-CCNC: 18 U/L — SIGNIFICANT CHANGE UP (ref 0–41)
BILIRUB SERPL-MCNC: 0.6 MG/DL — SIGNIFICANT CHANGE UP (ref 0.2–1.2)
BUN SERPL-MCNC: 10 MG/DL — SIGNIFICANT CHANGE UP (ref 10–20)
CALCIUM SERPL-MCNC: 9.4 MG/DL — SIGNIFICANT CHANGE UP (ref 8.5–10.1)
CHLORIDE SERPL-SCNC: 101 MMOL/L — SIGNIFICANT CHANGE UP (ref 98–110)
CK MB CFR SERPL CALC: 3 NG/ML — SIGNIFICANT CHANGE UP (ref 0.6–6.3)
CK SERPL-CCNC: 257 U/L — HIGH (ref 0–225)
CO2 SERPL-SCNC: 21 MMOL/L — SIGNIFICANT CHANGE UP (ref 17–32)
CREAT SERPL-MCNC: 1.1 MG/DL — SIGNIFICANT CHANGE UP (ref 0.7–1.5)
GLUCOSE BLDC GLUCOMTR-MCNC: 251 MG/DL — HIGH (ref 70–99)
GLUCOSE BLDC GLUCOMTR-MCNC: 252 MG/DL — HIGH (ref 70–99)
GLUCOSE BLDC GLUCOMTR-MCNC: 364 MG/DL — HIGH (ref 70–99)
GLUCOSE SERPL-MCNC: 190 MG/DL — HIGH (ref 70–99)
HCT VFR BLD CALC: 48.8 % — SIGNIFICANT CHANGE UP (ref 42–52)
HGB BLD-MCNC: 16.5 G/DL — SIGNIFICANT CHANGE UP (ref 14–18)
MCHC RBC-ENTMCNC: 29 PG — SIGNIFICANT CHANGE UP (ref 27–31)
MCHC RBC-ENTMCNC: 33.8 G/DL — SIGNIFICANT CHANGE UP (ref 32–37)
MCV RBC AUTO: 85.8 FL — SIGNIFICANT CHANGE UP (ref 80–94)
NRBC # BLD: 0 /100 WBCS — SIGNIFICANT CHANGE UP (ref 0–0)
NT-PROBNP SERPL-SCNC: 702 PG/ML — HIGH (ref 0–300)
PLATELET # BLD AUTO: 216 K/UL — SIGNIFICANT CHANGE UP (ref 130–400)
POTASSIUM SERPL-MCNC: 4.5 MMOL/L — SIGNIFICANT CHANGE UP (ref 3.5–5)
POTASSIUM SERPL-SCNC: 4.5 MMOL/L — SIGNIFICANT CHANGE UP (ref 3.5–5)
PROT SERPL-MCNC: 6.6 G/DL — SIGNIFICANT CHANGE UP (ref 6–8)
RBC # BLD: 5.69 M/UL — SIGNIFICANT CHANGE UP (ref 4.7–6.1)
RBC # FLD: 12.4 % — SIGNIFICANT CHANGE UP (ref 11.5–14.5)
SODIUM SERPL-SCNC: 138 MMOL/L — SIGNIFICANT CHANGE UP (ref 135–146)
TROPONIN T SERPL-MCNC: 0.03 NG/ML — CRITICAL HIGH
WBC # BLD: 12.82 K/UL — HIGH (ref 4.8–10.8)
WBC # FLD AUTO: 12.82 K/UL — HIGH (ref 4.8–10.8)

## 2019-09-12 PROCEDURE — 99223 1ST HOSP IP/OBS HIGH 75: CPT

## 2019-09-12 PROCEDURE — 71045 X-RAY EXAM CHEST 1 VIEW: CPT | Mod: 26,59

## 2019-09-12 PROCEDURE — 71046 X-RAY EXAM CHEST 2 VIEWS: CPT | Mod: 26

## 2019-09-12 PROCEDURE — 93010 ELECTROCARDIOGRAM REPORT: CPT

## 2019-09-12 PROCEDURE — 93306 TTE W/DOPPLER COMPLETE: CPT | Mod: 26

## 2019-09-12 PROCEDURE — 99222 1ST HOSP IP/OBS MODERATE 55: CPT

## 2019-09-12 RX ORDER — IPRATROPIUM/ALBUTEROL SULFATE 18-103MCG
3 AEROSOL WITH ADAPTER (GRAM) INHALATION EVERY 6 HOURS
Refills: 0 | Status: DISCONTINUED | OUTPATIENT
Start: 2019-09-12 | End: 2019-09-12

## 2019-09-12 RX ORDER — CLOPIDOGREL BISULFATE 75 MG/1
0 TABLET, FILM COATED ORAL
Qty: 30 | Refills: 0 | DISCHARGE

## 2019-09-12 RX ORDER — ENOXAPARIN SODIUM 100 MG/ML
40 INJECTION SUBCUTANEOUS DAILY
Refills: 0 | Status: DISCONTINUED | OUTPATIENT
Start: 2019-09-12 | End: 2019-09-13

## 2019-09-12 RX ORDER — IPRATROPIUM/ALBUTEROL SULFATE 18-103MCG
3 AEROSOL WITH ADAPTER (GRAM) INHALATION EVERY 4 HOURS
Refills: 0 | Status: DISCONTINUED | OUTPATIENT
Start: 2019-09-12 | End: 2019-09-13

## 2019-09-12 RX ORDER — ISOSORBIDE DINITRATE 5 MG/1
20 TABLET ORAL
Refills: 0 | Status: DISCONTINUED | OUTPATIENT
Start: 2019-09-12 | End: 2019-09-13

## 2019-09-12 RX ORDER — METOPROLOL TARTRATE 50 MG
0 TABLET ORAL
Qty: 180 | Refills: 0 | DISCHARGE

## 2019-09-12 RX ORDER — FUROSEMIDE 40 MG
0 TABLET ORAL
Qty: 30 | Refills: 0 | DISCHARGE

## 2019-09-12 RX ORDER — METOPROLOL TARTRATE 50 MG
200 TABLET ORAL DAILY
Refills: 0 | Status: DISCONTINUED | OUTPATIENT
Start: 2019-09-12 | End: 2019-09-13

## 2019-09-12 RX ORDER — ATORVASTATIN CALCIUM 80 MG/1
0 TABLET, FILM COATED ORAL
Qty: 30 | Refills: 0 | DISCHARGE

## 2019-09-12 RX ORDER — CHLORHEXIDINE GLUCONATE 213 G/1000ML
1 SOLUTION TOPICAL
Refills: 0 | Status: DISCONTINUED | OUTPATIENT
Start: 2019-09-12 | End: 2019-09-13

## 2019-09-12 RX ORDER — GLYCOPYRROLATE AND FORMOTEROL FUMARATE 9; 4.8 UG/1; UG/1
2 AEROSOL, METERED RESPIRATORY (INHALATION)
Refills: 0 | Status: DISCONTINUED | OUTPATIENT
Start: 2019-09-12 | End: 2019-09-12

## 2019-09-12 RX ORDER — IRBESARTAN 75 MG/1
0 TABLET ORAL
Qty: 30 | Refills: 0 | DISCHARGE

## 2019-09-12 RX ORDER — DEXTROSE 50 % IN WATER 50 %
12.5 SYRINGE (ML) INTRAVENOUS ONCE
Refills: 0 | Status: DISCONTINUED | OUTPATIENT
Start: 2019-09-12 | End: 2019-09-13

## 2019-09-12 RX ORDER — DEXTROSE 50 % IN WATER 50 %
25 SYRINGE (ML) INTRAVENOUS ONCE
Refills: 0 | Status: DISCONTINUED | OUTPATIENT
Start: 2019-09-12 | End: 2019-09-13

## 2019-09-12 RX ORDER — DEXTROSE 50 % IN WATER 50 %
15 SYRINGE (ML) INTRAVENOUS ONCE
Refills: 0 | Status: DISCONTINUED | OUTPATIENT
Start: 2019-09-12 | End: 2019-09-13

## 2019-09-12 RX ORDER — INSULIN LISPRO 100/ML
5 VIAL (ML) SUBCUTANEOUS
Refills: 0 | Status: DISCONTINUED | OUTPATIENT
Start: 2019-09-12 | End: 2019-09-13

## 2019-09-12 RX ORDER — FENOFIBRATE,MICRONIZED 130 MG
145 CAPSULE ORAL DAILY
Refills: 0 | Status: DISCONTINUED | OUTPATIENT
Start: 2019-09-12 | End: 2019-09-13

## 2019-09-12 RX ORDER — INSULIN GLARGINE 100 [IU]/ML
18 INJECTION, SOLUTION SUBCUTANEOUS EVERY MORNING
Refills: 0 | Status: DISCONTINUED | OUTPATIENT
Start: 2019-09-12 | End: 2019-09-13

## 2019-09-12 RX ORDER — SODIUM CHLORIDE 9 MG/ML
1000 INJECTION, SOLUTION INTRAVENOUS
Refills: 0 | Status: DISCONTINUED | OUTPATIENT
Start: 2019-09-12 | End: 2019-09-13

## 2019-09-12 RX ORDER — ASPIRIN/CALCIUM CARB/MAGNESIUM 324 MG
81 TABLET ORAL DAILY
Refills: 0 | Status: DISCONTINUED | OUTPATIENT
Start: 2019-09-12 | End: 2019-09-13

## 2019-09-12 RX ORDER — CLOPIDOGREL BISULFATE 75 MG/1
75 TABLET, FILM COATED ORAL DAILY
Refills: 0 | Status: DISCONTINUED | OUTPATIENT
Start: 2019-09-12 | End: 2019-09-13

## 2019-09-12 RX ORDER — CEFTRIAXONE 500 MG/1
1000 INJECTION, POWDER, FOR SOLUTION INTRAMUSCULAR; INTRAVENOUS EVERY 24 HOURS
Refills: 0 | Status: DISCONTINUED | OUTPATIENT
Start: 2019-09-12 | End: 2019-09-12

## 2019-09-12 RX ORDER — GLUCAGON INJECTION, SOLUTION 0.5 MG/.1ML
1 INJECTION, SOLUTION SUBCUTANEOUS ONCE
Refills: 0 | Status: DISCONTINUED | OUTPATIENT
Start: 2019-09-12 | End: 2019-09-13

## 2019-09-12 RX ORDER — FUROSEMIDE 40 MG
60 TABLET ORAL DAILY
Refills: 0 | Status: DISCONTINUED | OUTPATIENT
Start: 2019-09-12 | End: 2019-09-13

## 2019-09-12 RX ORDER — ATORVASTATIN CALCIUM 80 MG/1
40 TABLET, FILM COATED ORAL AT BEDTIME
Refills: 0 | Status: DISCONTINUED | OUTPATIENT
Start: 2019-09-12 | End: 2019-09-13

## 2019-09-12 RX ORDER — FUROSEMIDE 40 MG
40 TABLET ORAL DAILY
Refills: 0 | Status: DISCONTINUED | OUTPATIENT
Start: 2019-09-12 | End: 2019-09-12

## 2019-09-12 RX ORDER — ZOLPIDEM TARTRATE 10 MG/1
5 TABLET ORAL AT BEDTIME
Refills: 0 | Status: DISCONTINUED | OUTPATIENT
Start: 2019-09-12 | End: 2019-09-13

## 2019-09-12 RX ORDER — LOSARTAN POTASSIUM 100 MG/1
100 TABLET, FILM COATED ORAL DAILY
Refills: 0 | Status: DISCONTINUED | OUTPATIENT
Start: 2019-09-12 | End: 2019-09-13

## 2019-09-12 RX ORDER — INFLUENZA VIRUS VACCINE 15; 15; 15; 15 UG/.5ML; UG/.5ML; UG/.5ML; UG/.5ML
0.5 SUSPENSION INTRAMUSCULAR ONCE
Refills: 0 | Status: DISCONTINUED | OUTPATIENT
Start: 2019-09-12 | End: 2019-09-13

## 2019-09-12 RX ORDER — PANTOPRAZOLE SODIUM 20 MG/1
40 TABLET, DELAYED RELEASE ORAL
Refills: 0 | Status: DISCONTINUED | OUTPATIENT
Start: 2019-09-12 | End: 2019-09-13

## 2019-09-12 RX ORDER — FUROSEMIDE 40 MG
40 TABLET ORAL ONCE
Refills: 0 | Status: COMPLETED | OUTPATIENT
Start: 2019-09-12 | End: 2019-09-12

## 2019-09-12 RX ADMIN — PANTOPRAZOLE SODIUM 40 MILLIGRAM(S): 20 TABLET, DELAYED RELEASE ORAL at 08:18

## 2019-09-12 RX ADMIN — Medication 40 MILLIGRAM(S): at 05:32

## 2019-09-12 RX ADMIN — Medication 3 MILLILITER(S): at 19:44

## 2019-09-12 RX ADMIN — Medication 3 MILLILITER(S): at 17:00

## 2019-09-12 RX ADMIN — Medication 5 UNIT(S): at 09:05

## 2019-09-12 RX ADMIN — Medication 110 MILLIGRAM(S): at 17:00

## 2019-09-12 RX ADMIN — Medication 3 MILLILITER(S): at 08:18

## 2019-09-12 RX ADMIN — LOSARTAN POTASSIUM 100 MILLIGRAM(S): 100 TABLET, FILM COATED ORAL at 05:29

## 2019-09-12 RX ADMIN — ENOXAPARIN SODIUM 40 MILLIGRAM(S): 100 INJECTION SUBCUTANEOUS at 12:14

## 2019-09-12 RX ADMIN — Medication 60 MILLIGRAM(S): at 17:00

## 2019-09-12 RX ADMIN — Medication 40 MILLIGRAM(S): at 00:30

## 2019-09-12 RX ADMIN — INSULIN GLARGINE 18 UNIT(S): 100 INJECTION, SOLUTION SUBCUTANEOUS at 08:18

## 2019-09-12 RX ADMIN — Medication 60 MILLIGRAM(S): at 05:32

## 2019-09-12 RX ADMIN — CLOPIDOGREL BISULFATE 75 MILLIGRAM(S): 75 TABLET, FILM COATED ORAL at 12:13

## 2019-09-12 RX ADMIN — Medication 5 UNIT(S): at 18:01

## 2019-09-12 RX ADMIN — Medication 200 MILLIGRAM(S): at 05:31

## 2019-09-12 RX ADMIN — CHLORHEXIDINE GLUCONATE 1 APPLICATION(S): 213 SOLUTION TOPICAL at 05:29

## 2019-09-12 RX ADMIN — ATORVASTATIN CALCIUM 40 MILLIGRAM(S): 80 TABLET, FILM COATED ORAL at 21:23

## 2019-09-12 RX ADMIN — Medication 600 MILLIGRAM(S): at 05:30

## 2019-09-12 RX ADMIN — Medication 600 MILLIGRAM(S): at 17:18

## 2019-09-12 RX ADMIN — Medication 5 UNIT(S): at 13:14

## 2019-09-12 RX ADMIN — Medication 3 MILLILITER(S): at 12:13

## 2019-09-12 RX ADMIN — Medication 145 MILLIGRAM(S): at 12:13

## 2019-09-12 NOTE — CONSULT NOTE ADULT - SUBJECTIVE AND OBJECTIVE BOX
Date of Admission: 09-12-19    CHIEF COMPLAINT:     HISTORY OF PRESENT ILLNESS:     64 year old male with PMH of COPD not on home O2, CAD s/p PCI as he claims, Ao valve replacement with bioprosthetic valve, HFpEF, HTN, DM II, DLD presents to the ED complaining of productive cough and chest pain. Cough started on day of presentation with subjective fever and associated chest pain that is substernal feels like chest tightness and worse on exertion with inability to go up a flight of stairs, worse on lying down and when taking a deep inspiration and non radiating. Denies diaphoresis, nausea, syncope or presyncope. Patient also endorses that recently he has been having that same chest pressure on exertion upon going up a flight of stairs. Denies LE swelling, denies orthopnea and PND. Patient is a current daily smoker of 3/4 of pack daily for > 20 years, last cigarette was on day prior to presentation. Patient does not have a pulmonologist.    In ED: VSS, WBC 13, Troponin 0.03, pBNP 702. CXR with no focal consolidation. Was given Lasix 40 IV once.   He was admitted for COPD exacerbation due to URI and ACS rule out     PAST MEDICAL & SURGICAL HISTORY  Chronic obstructive pulmonary disease (COPD)  CAD (coronary artery disease)  Diabetes  CHF (congestive heart failure)  Heart valve replaced  H/O heart artery stent      FAMILY HISTORY:  FAMILY HISTORY:      SOCIAL HISTORY:  [+]smoker  [-]Alcohol  [-]Drug    ROS:  Negative except as mentioned in HPI    ALLERGIES:  sulfa drugs (Unknown)      MEDICATIONS:  MEDICATIONS  (STANDING):  ALBUTerol/ipratropium for Nebulization 3 milliLiter(s) Nebulizer every 6 hours  atorvastatin 40 milliGRAM(s) Oral at bedtime  cefTRIAXone   IVPB 1000 milliGRAM(s) IV Intermittent every 24 hours  chlorhexidine 4% Liquid 1 Application(s) Topical <User Schedule>  clopidogrel Tablet 75 milliGRAM(s) Oral daily  dextrose 5%. 1000 milliLiter(s) (50 mL/Hr) IV Continuous <Continuous>  dextrose 50% Injectable 12.5 Gram(s) IV Push once  dextrose 50% Injectable 25 Gram(s) IV Push once  dextrose 50% Injectable 25 Gram(s) IV Push once  enoxaparin Injectable 40 milliGRAM(s) SubCutaneous daily  fenofibrate Tablet 145 milliGRAM(s) Oral daily  furosemide    Tablet 40 milliGRAM(s) Oral daily  glycopyrrolate 9 MICROgram(s)/formoterol 4.8 MICROgram(s)Inhaler 2 Puff(s) Inhalation two times a day  guaiFENesin  milliGRAM(s) Oral every 12 hours  influenza   Vaccine 0.5 milliLiter(s) IntraMuscular once  insulin glargine Injectable (LANTUS) 18 Unit(s) SubCutaneous every morning  insulin lispro Injectable (HumaLOG) 5 Unit(s) SubCutaneous three times a day before meals  losartan 100 milliGRAM(s) Oral daily  metoprolol succinate  milliGRAM(s) Oral daily  pantoprazole    Tablet 40 milliGRAM(s) Oral before breakfast    MEDICATIONS  (PRN):  dextrose 40% Gel 15 Gram(s) Oral once PRN Blood Glucose LESS THAN 70 milliGRAM(s)/deciliter  glucagon  Injectable 1 milliGRAM(s) IntraMuscular once PRN Glucose LESS THAN 70 milligrams/deciliter  zolpidem 5 milliGRAM(s) Oral at bedtime PRN Insomnia      HOME MEDICATIONS:  Home Medications:  atorvastatin 40 mg oral tablet: 1 tab(s) orally once a day (at bedtime) (12 Sep 2019 04:15)  Bevespi Aerosphere 9 mcg-4.8 mcg/inh inhalation aerosol: 2 puff(s) inhaled 2 times a day (12 Sep 2019 04:25)  clopidogrel 75 mg oral tablet: 1 tab(s) orally once a day (12 Sep 2019 04:16)  ESZOPICLONE 3 MG TABLET:  (07 Nov 2018 21:22)  FENOFIBRATE 160 MG TABLET:  (07 Nov 2018 21:22)  furosemide 40 mg oral tablet: 1 tab(s) orally once a day (12 Sep 2019 04:18)  irbesartan 300 mg oral tablet: 1 tab(s) orally once a day (12 Sep 2019 04:14)  metoprolol succinate 200 mg oral capsule, extended release: 1 cap(s) orally once a day (12 Sep 2019 04:17)  OMEGA-3 ETHYL ESTERS 1 GM CAP:  (07 Nov 2018 21:22)  TRESIBA FLEXTOUCH 200 UNITS/ML:  (07 Nov 2018 21:22)  TRULICITY 1.5 MG/0.5 ML PEN:  (07 Nov 2018 21:22)      VITALS:   T(F): 98.2 (09-12 @ 05:28), Max: 98.4 (09-12 @ 02:06)  HR: 97 (09-12 @ 05:28) (97 - 102)  BP: 144/88 (09-12 @ 05:28) (144/88 - 175/88)  BP(mean): --  RR: 19 (09-12 @ 05:28) (18 - 22)  SpO2: 98% (09-12 @ 05:28) (95% - 98%)    I&O's Summary      PHYSICAL EXAM:  GEN: Not in acute distress, lying flat in bed, 96% on room air   HEENT: NCAT, PERRL, EOMI  LUNGS: Clear to auscultation bilaterally, no wheezes or crackles   CARDIOVASCULAR: RRR, S1/S2 present, systolic murmur at the aortic site, rubs or gallops, no JVD, + PP bilaterally  ABD: Soft, non-tender, non-distended  EXT: No JUAN  SKIN: Intact  NEURO: AAOx3    LABS:                        16.5   12.82 )-----------( 216      ( 12 Sep 2019 00:18 )             48.8     09-12    138  |  101  |  10  ----------------------------<  190<H>  4.5   |  21  |  1.1    Ca    9.4      12 Sep 2019 00:18    TPro  6.6  /  Alb  4.1  /  TBili  0.6  /  DBili  x   /  AST  18  /  ALT  27  /  AlkPhos  59  09-12      Creatine Kinase, Serum: 257 U/L <H> (09-12-19 @ 07:28)  Troponin T, Serum: 0.03 ng/mL <HH> (09-12-19 @ 07:28)  Troponin T, Serum: 0.03 ng/mL <HH> (09-12-19 @ 00:18)    Troponin 0.03, CKMB 3.0, / 09-12-19 @ 07:28  Troponin 0.03, CKMB --, CK --/ 09-12-19 @ 00:18    Serum Pro-Brain Natriuretic Peptide: 702 pg/mL (09-12-19 @ 00:18)    Hemoglobin A1C   Thyroid      RADIOLOGY:  -CXR:  < from: Xray Chest 2 Views PA/Lat (11.07.18 @ 14:49) >  Findings:    Support devices: None.    Cardiac/mediastinum/hilum: Cardiomegaly, thoracic aortic calcification.   Status post median sternotomy and aortic prosthetic valve..    Lung parenchyma/Pleura: Bilateral opacities.    Skeleton/soft tissues: Stable. Post surgical changes at the EG junction   with surgical clips and staples..    Impression:      Bilateral opacities. Stable cardiomegaly.    < end of copied text >    -TTE:    -CCTA in 2016  Coronary Anatomy: There is no evidence for anomalous coronary arteries.       Dominance:Right dominant.      The left main coronary artery (LM) demonstrates mild luminal irregularity  without significant narrowing.  Branches: The Left main coronary artery  bifurcates into LAD and circumflex arteries.      The left anterior descending coronary artery (LAD) demonstrates subtotal  occlusion of mid-vessel secondary to mixed plaque.  Two patent diagonal (D)  branches are identified.  The distal LAD wraps around the apex.        The left circumflex coronary artery (LCX) demonstrates approximately 50% ostial  stenosis secondary to non-calcified plaque. The small AV branch demonstrates at  least 70% stenosis from mixed plaque.  Two patent obtuse marginal (OM) branches  are identified.      The right coronary artery (RCA) demonstrates severe mid-vessel stenosis  secondary to mixed plaque.        Calcium Score: The observed calcium score of 510 is at percentile 90 for  subjects of the same age, gender, and race/ethinicity who are free of clinical  cardiovascular disease and treated diabetes. (FRANCOIS Score)      Cardiac Morphology And Function: Left ventricular hypertrophy, diastolic LV  wall thickness is 17 mm.        LVEF:34 %  LV end diastolic volume:131 cc   LV end systolic volume:86 cc  LV stroke volume:45 cc      Valves: Post aortic bioprosthetic valve replacement, with valve appearing to  open on functional imaging. Aortic valve area 159 mmS2.       Pericardium: Normal pericardial thickness. No pericardial effusion or  calcification.      Aorta: Aortic atherosclerotic disease.       Noncardiac Findings: No significant noncardiac findings. Post median  sternotomy.       IMPRESSION:      Severe mid-RCA stenosis secondary to mixed plaque.        Subtotal mid-LAD stenosis secondary to mixed plaque.        Approximately 50% circumflex ostial stenosis secondary to non-calcified plaque.  A small AV groove  branch demonstrates at least 70% stenosis from mixed plaque.     Post aortic bioprosthetic valve replacement, with valve appearing to open on  functional imaging. Aortic valve area approximately 159 mmS2.       Left ventricular hypertrophy, with diastolic left ventricular wall thickness of  17 mm. EF = 34%      -STRESS TEST:  -CATHETERIZATION:    ECG:  < from: 12 Lead ECG (09.11.19 @ 23:05) >  Diagnosis Line Sinus rhythm withoccasional Premature ventricular complexes  Left axis deviation  Left bundle branch block  Abnormal ECG    < end of copied text >      TELEMETRY EVENTS: Date of Admission: 09-12-19    CHIEF COMPLAINT:     HISTORY OF PRESENT ILLNESS:     64 year old male with PMH of COPD not on home O2, CAD s/p PCI as he claims, Ao valve replacement with bioprosthetic valve, HFpEF, HTN, DM II, DLD presents to the ED complaining of productive cough and chest pain. Cough started on day of presentation with subjective fever and associated chest pain that is substernal feels like chest tightness and worse on exertion with inability to go up a flight of stairs, worse on lying down and when taking a deep inspiration and non radiating. Denies diaphoresis, nausea, syncope or presyncope. Patient also endorses that recently he has been having that same chest pressure on exertion upon going up a flight of stairs. Denies LE swelling, denies orthopnea and PND. Patient is a current daily smoker of 3/4 of pack daily for > 20 years, last cigarette was on day prior to presentation. Patient does not have a pulmonologist.    In ED: VSS, WBC 13, Troponin 0.03, pBNP 702. CXR with no focal consolidation. Was given Lasix 40 IV once.   He was admitted for COPD exacerbation due to URI and ACS rule out     PAST MEDICAL & SURGICAL HISTORY  Chronic obstructive pulmonary disease (COPD)  CAD (coronary artery disease)  Diabetes  CHF (congestive heart failure)  Heart valve replaced  H/O heart artery stent      FAMILY HISTORY:  FAMILY HISTORY:      SOCIAL HISTORY:  [+]smoker  [-]Alcohol  [-]Drug    ROS:  Negative except as mentioned in HPI    ALLERGIES:  sulfa drugs (Unknown)      MEDICATIONS:  MEDICATIONS  (STANDING):  ALBUTerol/ipratropium for Nebulization 3 milliLiter(s) Nebulizer every 6 hours  atorvastatin 40 milliGRAM(s) Oral at bedtime  cefTRIAXone   IVPB 1000 milliGRAM(s) IV Intermittent every 24 hours  chlorhexidine 4% Liquid 1 Application(s) Topical <User Schedule>  clopidogrel Tablet 75 milliGRAM(s) Oral daily  dextrose 5%. 1000 milliLiter(s) (50 mL/Hr) IV Continuous <Continuous>  dextrose 50% Injectable 12.5 Gram(s) IV Push once  dextrose 50% Injectable 25 Gram(s) IV Push once  dextrose 50% Injectable 25 Gram(s) IV Push once  enoxaparin Injectable 40 milliGRAM(s) SubCutaneous daily  fenofibrate Tablet 145 milliGRAM(s) Oral daily  furosemide    Tablet 40 milliGRAM(s) Oral daily  glycopyrrolate 9 MICROgram(s)/formoterol 4.8 MICROgram(s)Inhaler 2 Puff(s) Inhalation two times a day  guaiFENesin  milliGRAM(s) Oral every 12 hours  influenza   Vaccine 0.5 milliLiter(s) IntraMuscular once  insulin glargine Injectable (LANTUS) 18 Unit(s) SubCutaneous every morning  insulin lispro Injectable (HumaLOG) 5 Unit(s) SubCutaneous three times a day before meals  losartan 100 milliGRAM(s) Oral daily  metoprolol succinate  milliGRAM(s) Oral daily  pantoprazole    Tablet 40 milliGRAM(s) Oral before breakfast    MEDICATIONS  (PRN):  dextrose 40% Gel 15 Gram(s) Oral once PRN Blood Glucose LESS THAN 70 milliGRAM(s)/deciliter  glucagon  Injectable 1 milliGRAM(s) IntraMuscular once PRN Glucose LESS THAN 70 milligrams/deciliter  zolpidem 5 milliGRAM(s) Oral at bedtime PRN Insomnia      HOME MEDICATIONS:  Home Medications:  atorvastatin 40 mg oral tablet: 1 tab(s) orally once a day (at bedtime) (12 Sep 2019 04:15)  Bevespi Aerosphere 9 mcg-4.8 mcg/inh inhalation aerosol: 2 puff(s) inhaled 2 times a day (12 Sep 2019 04:25)  clopidogrel 75 mg oral tablet: 1 tab(s) orally once a day (12 Sep 2019 04:16)  ESZOPICLONE 3 MG TABLET:  (07 Nov 2018 21:22)  FENOFIBRATE 160 MG TABLET:  (07 Nov 2018 21:22)  furosemide 40 mg oral tablet: 1 tab(s) orally once a day (12 Sep 2019 04:18)  irbesartan 300 mg oral tablet: 1 tab(s) orally once a day (12 Sep 2019 04:14)  metoprolol succinate 200 mg oral capsule, extended release: 1 cap(s) orally once a day (12 Sep 2019 04:17)  OMEGA-3 ETHYL ESTERS 1 GM CAP:  (07 Nov 2018 21:22)  TRESIBA FLEXTOUCH 200 UNITS/ML:  (07 Nov 2018 21:22)  TRULICITY 1.5 MG/0.5 ML PEN:  (07 Nov 2018 21:22)      VITALS:   T(F): 98.2 (09-12 @ 05:28), Max: 98.4 (09-12 @ 02:06)  HR: 97 (09-12 @ 05:28) (97 - 102)  BP: 144/88 (09-12 @ 05:28) (144/88 - 175/88)  BP(mean): --  RR: 19 (09-12 @ 05:28) (18 - 22)  SpO2: 98% (09-12 @ 05:28) (95% - 98%)    I&O's Summary      PHYSICAL EXAM:  GEN: Not in acute distress, lying flat in bed, 96% on room air   HEENT: NCAT, PERRL, EOMI  LUNGS: Clear to auscultation bilaterally, no wheezes or crackles   CARDIOVASCULAR: RRR, S1/S2 present, systolic murmur at the aortic site, rubs or gallops, no JVD, + PP bilaterally  ABD: Soft, non-tender, non-distended  EXT: No JUAN  SKIN: Intact  NEURO: AAOx3    LABS:                        16.5   12.82 )-----------( 216      ( 12 Sep 2019 00:18 )             48.8     09-12    138  |  101  |  10  ----------------------------<  190<H>  4.5   |  21  |  1.1    Ca    9.4      12 Sep 2019 00:18    TPro  6.6  /  Alb  4.1  /  TBili  0.6  /  DBili  x   /  AST  18  /  ALT  27  /  AlkPhos  59  09-12      Creatine Kinase, Serum: 257 U/L <H> (09-12-19 @ 07:28)  Troponin T, Serum: 0.03 ng/mL <HH> (09-12-19 @ 07:28)  Troponin T, Serum: 0.03 ng/mL <HH> (09-12-19 @ 00:18)    Troponin 0.03, CKMB 3.0, / 09-12-19 @ 07:28  Troponin 0.03, CKMB --, CK --/ 09-12-19 @ 00:18    Serum Pro-Brain Natriuretic Peptide: 702 pg/mL (09-12-19 @ 00:18)    Hemoglobin A1C   Thyroid      RADIOLOGY:  -CXR:  < from: Xray Chest 2 Views PA/Lat (11.07.18 @ 14:49) >  Findings:    Support devices: None.    Cardiac/mediastinum/hilum: Cardiomegaly, thoracic aortic calcification.   Status post median sternotomy and aortic prosthetic valve..    Lung parenchyma/Pleura: Bilateral opacities.    Skeleton/soft tissues: Stable. Post surgical changes at the EG junction   with surgical clips and staples..    Impression:      Bilateral opacities. Stable cardiomegaly.    < end of copied text >    -TTE:    -CCTA in 2016  Coronary Anatomy: There is no evidence for anomalous coronary arteries.       Dominance:Right dominant.      The left main coronary artery (LM) demonstrates mild luminal irregularity  without significant narrowing.  Branches: The Left main coronary artery  bifurcates into LAD and circumflex arteries.      The left anterior descending coronary artery (LAD) demonstrates subtotal  occlusion of mid-vessel secondary to mixed plaque.  Two patent diagonal (D)  branches are identified.  The distal LAD wraps around the apex.        The left circumflex coronary artery (LCX) demonstrates approximately 50% ostial  stenosis secondary to non-calcified plaque. The small AV branch demonstrates at  least 70% stenosis from mixed plaque.  Two patent obtuse marginal (OM) branches  are identified.      The right coronary artery (RCA) demonstrates severe mid-vessel stenosis  secondary to mixed plaque.        Calcium Score: The observed calcium score of 510 is at percentile 90 for  subjects of the same age, gender, and race/ethinicity who are free of clinical  cardiovascular disease and treated diabetes. (FRANCOIS Score)      Cardiac Morphology And Function: Left ventricular hypertrophy, diastolic LV  wall thickness is 17 mm.        LVEF:34 %  LV end diastolic volume:131 cc   LV end systolic volume:86 cc  LV stroke volume:45 cc      Valves: Post aortic bioprosthetic valve replacement, with valve appearing to  open on functional imaging. Aortic valve area 159 mmS2.       Pericardium: Normal pericardial thickness. No pericardial effusion or  calcification.      Aorta: Aortic atherosclerotic disease.       Noncardiac Findings: No significant noncardiac findings. Post median  sternotomy.       IMPRESSION:      Severe mid-RCA stenosis secondary to mixed plaque.        Subtotal mid-LAD stenosis secondary to mixed plaque.        Approximately 50% circumflex ostial stenosis secondary to non-calcified plaque.  A small AV groove  branch demonstrates at least 70% stenosis from mixed plaque.     Post aortic bioprosthetic valve replacement, with valve appearing to open on  functional imaging. Aortic valve area approximately 159 mmS2.       Left ventricular hypertrophy, with diastolic left ventricular wall thickness of  17 mm. EF = 34%      -STRESS TEST:  -CATHETERIZATION in 2016  Severe lesions to RCA and Mid LAD s/p PCI   Mild to moderate lesion of the Cx     ECG:  < from: 12 Lead ECG (09.11.19 @ 23:05) >  Diagnosis Line Sinus rhythm withoccasional Premature ventricular complexes  Left axis deviation  Left bundle branch block  Abnormal ECG    < end of copied text >      TELEMETRY EVENTS: Date of Admission: 09-12-19    CHIEF COMPLAINT:     HISTORY OF PRESENT ILLNESS:     64 year old male with PMH of COPD not on home O2, CAD s/p PCI as he claims, Ao valve replacement with bioprosthetic valve, HFpEF, HTN, DM II, DLD presents to the ED complaining of productive cough and chest pain. Cough started on day of presentation with subjective fever and associated chest pain that is substernal feels like chest tightness and worse on exertion with inability to go up a flight of stairs, worse on lying down and when taking a deep inspiration and non radiating. Denies diaphoresis, nausea, syncope or presyncope. Patient also endorses that recently he has been having that same chest pressure on exertion upon going up a flight of stairs. Denies LE swelling, denies orthopnea and PND. Patient is a current daily smoker of 3/4 of pack daily for > 20 years, last cigarette was on day prior to presentation. Patient does not have a pulmonologist.    In ED: VSS, WBC 13, Troponin 0.03, pBNP 702. CXR with no focal consolidation. Was given Lasix 40 IV once.   He was admitted for COPD exacerbation due to URI and ACS rule out     PAST MEDICAL & SURGICAL HISTORY  Chronic obstructive pulmonary disease (COPD)  CAD (coronary artery disease)  Diabetes  CHF (congestive heart failure)  Heart valve replaced  H/O heart artery stent      FAMILY HISTORY:  FAMILY HISTORY:      SOCIAL HISTORY:  [+]smoker  [-]Alcohol  [-]Drug    ROS:  Negative except as mentioned in HPI    ALLERGIES:  sulfa drugs (Unknown)      MEDICATIONS:  MEDICATIONS  (STANDING):  ALBUTerol/ipratropium for Nebulization 3 milliLiter(s) Nebulizer every 6 hours  atorvastatin 40 milliGRAM(s) Oral at bedtime  cefTRIAXone   IVPB 1000 milliGRAM(s) IV Intermittent every 24 hours  chlorhexidine 4% Liquid 1 Application(s) Topical <User Schedule>  clopidogrel Tablet 75 milliGRAM(s) Oral daily  dextrose 5%. 1000 milliLiter(s) (50 mL/Hr) IV Continuous <Continuous>  dextrose 50% Injectable 12.5 Gram(s) IV Push once  dextrose 50% Injectable 25 Gram(s) IV Push once  dextrose 50% Injectable 25 Gram(s) IV Push once  enoxaparin Injectable 40 milliGRAM(s) SubCutaneous daily  fenofibrate Tablet 145 milliGRAM(s) Oral daily  furosemide    Tablet 40 milliGRAM(s) Oral daily  glycopyrrolate 9 MICROgram(s)/formoterol 4.8 MICROgram(s)Inhaler 2 Puff(s) Inhalation two times a day  guaiFENesin  milliGRAM(s) Oral every 12 hours  influenza   Vaccine 0.5 milliLiter(s) IntraMuscular once  insulin glargine Injectable (LANTUS) 18 Unit(s) SubCutaneous every morning  insulin lispro Injectable (HumaLOG) 5 Unit(s) SubCutaneous three times a day before meals  losartan 100 milliGRAM(s) Oral daily  metoprolol succinate  milliGRAM(s) Oral daily  pantoprazole    Tablet 40 milliGRAM(s) Oral before breakfast    MEDICATIONS  (PRN):  dextrose 40% Gel 15 Gram(s) Oral once PRN Blood Glucose LESS THAN 70 milliGRAM(s)/deciliter  glucagon  Injectable 1 milliGRAM(s) IntraMuscular once PRN Glucose LESS THAN 70 milligrams/deciliter  zolpidem 5 milliGRAM(s) Oral at bedtime PRN Insomnia      HOME MEDICATIONS:  Home Medications:  atorvastatin 40 mg oral tablet: 1 tab(s) orally once a day (at bedtime) (12 Sep 2019 04:15)  Bevespi Aerosphere 9 mcg-4.8 mcg/inh inhalation aerosol: 2 puff(s) inhaled 2 times a day (12 Sep 2019 04:25)  clopidogrel 75 mg oral tablet: 1 tab(s) orally once a day (12 Sep 2019 04:16)  ESZOPICLONE 3 MG TABLET:  (07 Nov 2018 21:22)  FENOFIBRATE 160 MG TABLET:  (07 Nov 2018 21:22)  furosemide 40 mg oral tablet: 1 tab(s) orally once a day (12 Sep 2019 04:18)  irbesartan 300 mg oral tablet: 1 tab(s) orally once a day (12 Sep 2019 04:14)  metoprolol succinate 200 mg oral capsule, extended release: 1 cap(s) orally once a day (12 Sep 2019 04:17)  OMEGA-3 ETHYL ESTERS 1 GM CAP:  (07 Nov 2018 21:22)  TRESIBA FLEXTOUCH 200 UNITS/ML:  (07 Nov 2018 21:22)  TRULICITY 1.5 MG/0.5 ML PEN:  (07 Nov 2018 21:22)      VITALS:   T(F): 98.2 (09-12 @ 05:28), Max: 98.4 (09-12 @ 02:06)  HR: 97 (09-12 @ 05:28) (97 - 102)  BP: 144/88 (09-12 @ 05:28) (144/88 - 175/88)  BP(mean): --  RR: 19 (09-12 @ 05:28) (18 - 22)  SpO2: 98% (09-12 @ 05:28) (95% - 98%)    I&O's Summary      PHYSICAL EXAM:  GEN: Not in acute distress, lying flat in bed, 96% on room air   HEENT: NCAT, PERRL, EOMI  Neck: No JVD  LUNGS: Clear to auscultation bilaterally, no wheezes or crackles   CARDIOVASCULAR: RRR, S1/S2 present, systolic murmur at the aortic site, rubs or gallops, no JVD, + PP bilaterally  ABD: Soft, non-tender, non-distended  EXT: No JUAN  SKIN: Intact  NEURO: AAOx3    LABS:                        16.5   12.82 )-----------( 216      ( 12 Sep 2019 00:18 )             48.8     09-12    138  |  101  |  10  ----------------------------<  190<H>  4.5   |  21  |  1.1    Ca    9.4      12 Sep 2019 00:18    TPro  6.6  /  Alb  4.1  /  TBili  0.6  /  DBili  x   /  AST  18  /  ALT  27  /  AlkPhos  59  09-12      Creatine Kinase, Serum: 257 U/L <H> (09-12-19 @ 07:28)  Troponin T, Serum: 0.03 ng/mL <HH> (09-12-19 @ 07:28)  Troponin T, Serum: 0.03 ng/mL <HH> (09-12-19 @ 00:18)    Troponin 0.03, CKMB 3.0, / 09-12-19 @ 07:28  Troponin 0.03, CKMB --, CK --/ 09-12-19 @ 00:18    Serum Pro-Brain Natriuretic Peptide: 702 pg/mL (09-12-19 @ 00:18)    Hemoglobin A1C   Thyroid      RADIOLOGY:  -CXR:  < from: Xray Chest 2 Views PA/Lat (11.07.18 @ 14:49) >  Findings:    Support devices: None.    Cardiac/mediastinum/hilum: Cardiomegaly, thoracic aortic calcification.   Status post median sternotomy and aortic prosthetic valve..    Lung parenchyma/Pleura: Bilateral opacities.    Skeleton/soft tissues: Stable. Post surgical changes at the EG junction   with surgical clips and staples..    Impression:      Bilateral opacities. Stable cardiomegaly.    < end of copied text >    -TTE:    -CCTA in 2016  Coronary Anatomy: There is no evidence for anomalous coronary arteries.       Dominance:Right dominant.      The left main coronary artery (LM) demonstrates mild luminal irregularity  without significant narrowing.  Branches: The Left main coronary artery  bifurcates into LAD and circumflex arteries.      The left anterior descending coronary artery (LAD) demonstrates subtotal  occlusion of mid-vessel secondary to mixed plaque.  Two patent diagonal (D)  branches are identified.  The distal LAD wraps around the apex.        The left circumflex coronary artery (LCX) demonstrates approximately 50% ostial  stenosis secondary to non-calcified plaque. The small AV branch demonstrates at  least 70% stenosis from mixed plaque.  Two patent obtuse marginal (OM) branches  are identified.      The right coronary artery (RCA) demonstrates severe mid-vessel stenosis  secondary to mixed plaque.        Calcium Score: The observed calcium score of 510 is at percentile 90 for  subjects of the same age, gender, and race/ethinicity who are free of clinical  cardiovascular disease and treated diabetes. (FRANCOIS Score)      Cardiac Morphology And Function: Left ventricular hypertrophy, diastolic LV  wall thickness is 17 mm.        LVEF:34 %  LV end diastolic volume:131 cc   LV end systolic volume:86 cc  LV stroke volume:45 cc      Valves: Post aortic bioprosthetic valve replacement, with valve appearing to  open on functional imaging. Aortic valve area 159 mmS2.       Pericardium: Normal pericardial thickness. No pericardial effusion or  calcification.      Aorta: Aortic atherosclerotic disease.       Noncardiac Findings: No significant noncardiac findings. Post median  sternotomy.       IMPRESSION:      Severe mid-RCA stenosis secondary to mixed plaque.        Subtotal mid-LAD stenosis secondary to mixed plaque.        Approximately 50% circumflex ostial stenosis secondary to non-calcified plaque.  A small AV groove  branch demonstrates at least 70% stenosis from mixed plaque.     Post aortic bioprosthetic valve replacement, with valve appearing to open on  functional imaging. Aortic valve area approximately 159 mmS2.       Left ventricular hypertrophy, with diastolic left ventricular wall thickness of  17 mm. EF = 34%      -STRESS TEST:  -CATHETERIZATION in 2016  Severe lesions to RCA and Mid LAD s/p PCI   Mild to moderate lesion of the Cx     ECG:  < from: 12 Lead ECG (09.11.19 @ 23:05) >  Diagnosis Line Sinus rhythm withoccasional Premature ventricular complexes  Left axis deviation  Left bundle branch block  Abnormal ECG    < end of copied text >      TELEMETRY EVENTS:

## 2019-09-12 NOTE — CONSULT NOTE ADULT - SUBJECTIVE AND OBJECTIVE BOX
Patient is a 64y old  Male who presents with a chief complaint of Chest pain (12 Sep 2019 04:06)      HPI:    63 yo M active smoker about 1 PPD x 25 years, never been diagnosed with COPD doesn't use oxygen or see pulmonologust, Never been intubated in past and no prior hospitalizations for COPD, HFpEF, CAD s/p PCI, AV replacement porcine presents to ED for chest pains, wheezing and shortness of breath that began 2 days PTP.    Treated with nebs, steroids and lasix in ed feels much better.    Review of System:  See HPI    Allergies    sulfa drugs (Unknown)    Intolerances    Home Medications:  atorvastatin 40 mg oral tablet: 1 tab(s) orally once a day (at bedtime) (12 Sep 2019 04:15)  Bevespi Aerosphere 9 mcg-4.8 mcg/inh inhalation aerosol: 2 puff(s) inhaled 2 times a day (12 Sep 2019 04:25)  clopidogrel 75 mg oral tablet: 1 tab(s) orally once a day (12 Sep 2019 04:16)  ESZOPICLONE 3 MG TABLET:  (07 Nov 2018 21:22)  FENOFIBRATE 160 MG TABLET:  (07 Nov 2018 21:22)  furosemide 40 mg oral tablet: 1 tab(s) orally once a day (12 Sep 2019 04:18)  irbesartan 300 mg oral tablet: 1 tab(s) orally once a day (12 Sep 2019 04:14)  metoprolol succinate 200 mg oral capsule, extended release: 1 cap(s) orally once a day (12 Sep 2019 04:17)  OMEGA-3 ETHYL ESTERS 1 GM CAP:  (07 Nov 2018 21:22)  TRESIBA FLEXTOUCH 200 UNITS/ML:  (07 Nov 2018 21:22)  TRULICITY 1.5 MG/0.5 ML PEN:  (07 Nov 2018 21:22)      SOCIAL HX:    Smoking     active       ETOH/illicit drugs  denies               PAST MEDICAL & SURGICAL HISTORY:  Chronic obstructive pulmonary disease (COPD)  CAD (coronary artery disease)  Diabetes  CHF (congestive heart failure)  Heart valve replaced  H/O heart artery stent      FAMILY HISTORY:    No cardiovascular or pulmonary family history         PHYSICAL EXAM  Vital Signs Last 24 Hrs  T(C): 36.8 (12 Sep 2019 05:28), Max: 36.9 (12 Sep 2019 02:06)  T(F): 98.2 (12 Sep 2019 05:28), Max: 98.4 (12 Sep 2019 02:06)  HR: 97 (12 Sep 2019 05:28) (97 - 102)  BP: 144/88 (12 Sep 2019 05:28) (144/88 - 175/88)  RR: 19 (12 Sep 2019 05:28) (18 - 22)  SpO2: 98% (12 Sep 2019 05:28) (95% - 98%)    General: In NAD  HEENT: CARISSA             Lymphatic system: No LN  appreciated  Lungs: Josué BS faint expiratory wheeze bialteral  Cardiovascular: Regular  Gastrointestinal: Soft.  + BS   Musculoskeletal: No Clubbing.  Moves all extremities  Skin: Warm.  Intact  Neurological: No motor or sensory deficit       LABS:                          16.5   12.82 )-----------( 216      ( 12 Sep 2019 00:18 )             48.8                                               09-12    138  |  101  |  10  ----------------------------<  190<H>  4.5   |  21  |  1.1    Ca    9.4      12 Sep 2019 00:18    TPro  6.6  /  Alb  4.1  /  TBili  0.6  /  DBili  x   /  AST  18  /  ALT  27  /  AlkPhos  59  09-12                                                 CARDIAC MARKERS ( 12 Sep 2019 07:28 )  x     / 0.03 ng/mL / 257 U/L / x     / 3.0 ng/mL  CARDIAC MARKERS ( 12 Sep 2019 00:18 )  x     / 0.03 ng/mL / x     / x     / x                                                LIVER FUNCTIONS - ( 12 Sep 2019 00:18 )  Alb: 4.1 g/dL / Pro: 6.6 g/dL / ALK PHOS: 59 U/L / ALT: 27 U/L / AST: 18 U/L / GGT: x                                                                                                  Serum Pro-Brain Natriuretic Peptide: 702 pg/mL (09-12-19 @ 00:18)      ECHO  CXR reviewed no infiltrates    MEDICATIONS  (STANDING):  ALBUTerol/ipratropium for Nebulization 3 milliLiter(s) Nebulizer every 6 hours  atorvastatin 40 milliGRAM(s) Oral at bedtime  cefTRIAXone   IVPB 1000 milliGRAM(s) IV Intermittent every 24 hours  chlorhexidine 4% Liquid 1 Application(s) Topical <User Schedule>  clopidogrel Tablet 75 milliGRAM(s) Oral daily  dextrose 5%. 1000 milliLiter(s) (50 mL/Hr) IV Continuous <Continuous>  dextrose 50% Injectable 12.5 Gram(s) IV Push once  dextrose 50% Injectable 25 Gram(s) IV Push once  dextrose 50% Injectable 25 Gram(s) IV Push once  enoxaparin Injectable 40 milliGRAM(s) SubCutaneous daily  fenofibrate Tablet 145 milliGRAM(s) Oral daily  furosemide    Tablet 40 milliGRAM(s) Oral daily  glycopyrrolate 9 MICROgram(s)/formoterol 4.8 MICROgram(s)Inhaler 2 Puff(s) Inhalation two times a day  guaiFENesin  milliGRAM(s) Oral every 12 hours  influenza   Vaccine 0.5 milliLiter(s) IntraMuscular once  insulin glargine Injectable (LANTUS) 18 Unit(s) SubCutaneous every morning  insulin lispro Injectable (HumaLOG) 5 Unit(s) SubCutaneous three times a day before meals  losartan 100 milliGRAM(s) Oral daily  metoprolol succinate  milliGRAM(s) Oral daily  pantoprazole    Tablet 40 milliGRAM(s) Oral before breakfast    MEDICATIONS  (PRN):  dextrose 40% Gel 15 Gram(s) Oral once PRN Blood Glucose LESS THAN 70 milliGRAM(s)/deciliter  glucagon  Injectable 1 milliGRAM(s) IntraMuscular once PRN Glucose LESS THAN 70 milligrams/deciliter  zolpidem 5 milliGRAM(s) Oral at bedtime PRN Insomnia Patient is a 64y old  Male who presents with a chief complaint of Chest pain/ SOB (12 Sep 2019 04:06)      HPI:    65 yo M active smoker about 1 PPD x 25 years, never been diagnosed with COPD doesn't use oxygen or see pulmonologist, Never been intubated in past and no prior hospitalizations for COPD, HFpEF, CAD s/p PCI, AV replacement porcine presents to ED for chest pains, wheezing and shortness of breath that began 2 days PTP.    Treated with nebs, steroids and lasix in ed feels much better. no weight loss, ? yakelin symptoms    Review of System:  See HPI    Allergies    sulfa drugs (Unknown)    Intolerances    Home Medications:  atorvastatin 40 mg oral tablet: 1 tab(s) orally once a day (at bedtime) (12 Sep 2019 04:15)  Bevespi Aerosphere 9 mcg-4.8 mcg/inh inhalation aerosol: 2 puff(s) inhaled 2 times a day (12 Sep 2019 04:25)  clopidogrel 75 mg oral tablet: 1 tab(s) orally once a day (12 Sep 2019 04:16)  ESZOPICLONE 3 MG TABLET:  (07 Nov 2018 21:22)  FENOFIBRATE 160 MG TABLET:  (07 Nov 2018 21:22)  furosemide 40 mg oral tablet: 1 tab(s) orally once a day (12 Sep 2019 04:18)  irbesartan 300 mg oral tablet: 1 tab(s) orally once a day (12 Sep 2019 04:14)  metoprolol succinate 200 mg oral capsule, extended release: 1 cap(s) orally once a day (12 Sep 2019 04:17)  OMEGA-3 ETHYL ESTERS 1 GM CAP:  (07 Nov 2018 21:22)  TRESIBA FLEXTOUCH 200 UNITS/ML:  (07 Nov 2018 21:22)  TRULICITY 1.5 MG/0.5 ML PEN:  (07 Nov 2018 21:22)      SOCIAL HX:    Smoking     active       ETOH/illicit drugs  denies               PAST MEDICAL & SURGICAL HISTORY:  Chronic obstructive pulmonary disease (COPD)  CAD (coronary artery disease)  Diabetes  CHF (congestive heart failure)  Heart valve replaced  H/O heart artery stent      FAMILY HISTORY:    No cardiovascular or pulmonary family history         PHYSICAL EXAM  Vital Signs Last 24 Hrs  T(C): 36.8 (12 Sep 2019 05:28), Max: 36.9 (12 Sep 2019 02:06)  T(F): 98.2 (12 Sep 2019 05:28), Max: 98.4 (12 Sep 2019 02:06)  HR: 97 (12 Sep 2019 05:28) (97 - 102)  BP: 144/88 (12 Sep 2019 05:28) (144/88 - 175/88)  RR: 19 (12 Sep 2019 05:28) (18 - 22)  SpO2: 98% (12 Sep 2019 05:28) (95% - 98%)    General: In NAD  HEENT: CARISSA             Lymphatic system: No LN  appreciated  Lungs: Josué BS faint expiratory wheeze bilateral  Cardiovascular: Regular  Gastrointestinal: Soft.  + BS   Musculoskeletal: No Clubbing.  Moves all extremities  Skin: Warm.  Intact  Neurological: No motor or sensory deficit       LABS:                          16.5   12.82 )-----------( 216      ( 12 Sep 2019 00:18 )             48.8                                               09-12    138  |  101  |  10  ----------------------------<  190<H>  4.5   |  21  |  1.1    Ca    9.4      12 Sep 2019 00:18    TPro  6.6  /  Alb  4.1  /  TBili  0.6  /  DBili  x   /  AST  18  /  ALT  27  /  AlkPhos  59  09-12                                                 CARDIAC MARKERS ( 12 Sep 2019 07:28 )  x     / 0.03 ng/mL / 257 U/L / x     / 3.0 ng/mL  CARDIAC MARKERS ( 12 Sep 2019 00:18 )  x     / 0.03 ng/mL / x     / x     / x                                                LIVER FUNCTIONS - ( 12 Sep 2019 00:18 )  Alb: 4.1 g/dL / Pro: 6.6 g/dL / ALK PHOS: 59 U/L / ALT: 27 U/L / AST: 18 U/L / GGT: x                                                                                                  Serum Pro-Brain Natriuretic Peptide: 702 pg/mL (09-12-19 @ 00:18)      ECHO  CXR reviewed no infiltrates    MEDICATIONS  (STANDING):  ALBUTerol/ipratropium for Nebulization 3 milliLiter(s) Nebulizer every 6 hours  atorvastatin 40 milliGRAM(s) Oral at bedtime  cefTRIAXone   IVPB 1000 milliGRAM(s) IV Intermittent every 24 hours  chlorhexidine 4% Liquid 1 Application(s) Topical <User Schedule>  clopidogrel Tablet 75 milliGRAM(s) Oral daily  dextrose 5%. 1000 milliLiter(s) (50 mL/Hr) IV Continuous <Continuous>  dextrose 50% Injectable 12.5 Gram(s) IV Push once  dextrose 50% Injectable 25 Gram(s) IV Push once  dextrose 50% Injectable 25 Gram(s) IV Push once  enoxaparin Injectable 40 milliGRAM(s) SubCutaneous daily  fenofibrate Tablet 145 milliGRAM(s) Oral daily  furosemide    Tablet 40 milliGRAM(s) Oral daily  glycopyrrolate 9 MICROgram(s)/formoterol 4.8 MICROgram(s)Inhaler 2 Puff(s) Inhalation two times a day  guaiFENesin  milliGRAM(s) Oral every 12 hours  influenza   Vaccine 0.5 milliLiter(s) IntraMuscular once  insulin glargine Injectable (LANTUS) 18 Unit(s) SubCutaneous every morning  insulin lispro Injectable (HumaLOG) 5 Unit(s) SubCutaneous three times a day before meals  losartan 100 milliGRAM(s) Oral daily  metoprolol succinate  milliGRAM(s) Oral daily  pantoprazole    Tablet 40 milliGRAM(s) Oral before breakfast    MEDICATIONS  (PRN):  dextrose 40% Gel 15 Gram(s) Oral once PRN Blood Glucose LESS THAN 70 milliGRAM(s)/deciliter  glucagon  Injectable 1 milliGRAM(s) IntraMuscular once PRN Glucose LESS THAN 70 milligrams/deciliter  zolpidem 5 milliGRAM(s) Oral at bedtime PRN Insomnia

## 2019-09-12 NOTE — H&P ADULT - NSHPLABSRESULTS_GEN_ALL_CORE
16.5   12.82 )-----------( 216      ( 12 Sep 2019 00:18 )             48.8       09-12    138  |  101  |  10  ----------------------------<  190<H>  4.5   |  21  |  1.1    Ca    9.4      12 Sep 2019 00:18    TPro  6.6  /  Alb  4.1  /  TBili  0.6  /  DBili  x   /  AST  18  /  ALT  27  /  AlkPhos  59  09-12         CARDIAC MARKERS ( 12 Sep 2019 00:18 )  x     / 0.03 ng/mL / x     / x     / x

## 2019-09-12 NOTE — H&P ADULT - NSICDXPASTMEDICALHX_GEN_ALL_CORE_FT
PAST MEDICAL HISTORY:  CAD (coronary artery disease)     CHF (congestive heart failure)     Chronic obstructive pulmonary disease (COPD)     Diabetes

## 2019-09-12 NOTE — H&P ADULT - HISTORY OF PRESENT ILLNESS
64 year old male with PMH of COPD not on home O2, CAD w/ stent, HFpEF, HTN, DM II, DLD presents to the ED complaining of productive cough and chest pain. Cough started on day of presentation with subjective fever and associated chest pain that feels like chest tightness and worse on exertion. Patient is a current daily smoker of 3/4 of pack daily for > 20 years, last cigarette was on day prior to presentation. Patient does not have a pulmonologist.    In ED: VSS, WBC 13, Troponin 0.03, pBNP 702. CXR with no focal consolidation. Was given Lasix 40 IV once

## 2019-09-12 NOTE — CONSULT NOTE ADULT - ASSESSMENT
***ATTENDING RECOMMENDATIONS TO FOLLOW***    IMPRESSION   CAD with multivessel disease  Atypical chest pain - troponins 0.03 -> 0.03 in the setting of COPD exacerbation. Resolved s/p 1 dose of IV solumedrol   EKG with no acute ischemic changes, stable since 2018  HFpEF - Patient euvolemic   Aortic valve repair with porcine valve     RECOMMENDATIONS   - Get 2D echo   - Trend cardiac enzymes   - Continue plavix, statin and beta blocker   - Needs better blood pressure control   - Treat underlying cause     ***ATTENDING RECOMMENDATIONS TO FOLLOW*** ***ATTENDING RECOMMENDATIONS TO FOLLOW***    IMPRESSION   CAD with multivessel disease  Atypical chest pain - troponins 0.03 -> 0.03 in the setting of COPD exacerbation. Resolved s/p 1 dose of IV solumedrol   EKG with no acute ischemic changes, stable since 2018  HFpEF - Patient euvolemic   Aortic valve repair with porcine valve     RECOMMENDATIONS   - Get 2D echo   - Trend cardiac enzymes   - Continue plavix, statin and beta blocker   - Continue PO lasix   - Needs better blood pressure control   - Treat underlying cause     ***ATTENDING RECOMMENDATIONS TO FOLLOW*** ***ATTENDING RECOMMENDATIONS TO FOLLOW***    IMPRESSION   CAD with multivessel disease on optimized anti-anginal therapy   Acute atypical chest pain - troponins 0.03 -> 0.03 in the setting of COPD exacerbation. Resolved s/p 1 dose of IV solumedrol   EKG with no acute ischemic changes, stable since 2018  HFpEF - Patient euvolemic   Aortic valve repair with porcine valve     RECOMMENDATIONS   - Get 2D echo   - Trend cardiac enzymes   - Continue plavix, statin and beta blocker   - Continue PO lasix   - Needs better blood pressure control   - Treat underlying cause     ***ATTENDING RECOMMENDATIONS TO FOLLOW*** ***ATTENDING RECOMMENDATIONS TO FOLLOW***    IMPRESSION   CAD with multivessel disease with chronic stable angina on optimized anti-anginal therapy   Acute atypical chest pain - troponins 0.03 -> 0.03 in the setting of COPD exacerbation. Resolved s/p 1 dose of IV solumedrol   EKG with no acute ischemic changes, stable since 2018  HFpEF - Patient euvolemic   Aortic valve repair with porcine valve     RECOMMENDATIONS   - Get 2D echo   - Trend cardiac enzymes   - Continue plavix, statin and beta blocker   - Continue PO lasix   - Needs better blood pressure control   - Treat underlying cause     ***ATTENDING RECOMMENDATIONS TO FOLLOW*** ***ATTENDING RECOMMENDATIONS TO FOLLOW***    IMPRESSION   CAD with multivessel disease with chronic stable angina on optimized anti-anginal therapy   Acute atypical chest pain - troponins 0.03 -> 0.03 in the setting of COPD exacerbation. Resolved s/p 1 dose of IV solumedrol   EKG with no acute ischemic changes, stable since 2018  CHF mixed type - ECHO now with EF of 30% (34% on CCTA on 2016) - Patient euvolemic   Aortic valve repair with porcine valve     RECOMMENDATIONS   - Trend cardiac enzymes   - Continue plavix, statin and beta blocker, add a statin   - Continue PO lasix   - Needs better blood pressure control   - Will likely need cath as inpatient once COPD and lung disease optimized   - Stressed on the importance of medication adherence     ***ATTENDING RECOMMENDATIONS TO FOLLOW*** ***ATTENDING RECOMMENDATIONS TO FOLLOW***    IMPRESSION   CAD with multivessel disease with chronic stable angina on optimized anti-anginal therapy   Acute atypical chest pain - troponins 0.03 -> 0.03 in the setting of COPD exacerbation. Resolved s/p 1 dose of IV solumedrol   EKG with no acute ischemic changes, stable since 2018  CHF mixed type - ECHO now with EF of 30% (34% on CCTA on 2016) - Patient euvolemic   Aortic valve repair with porcine valve     RECOMMENDATIONS   - Trend cardiac enzymes   - Continue plavix, statin and beta blocker, add a aspirin   - Continue PO lasix   - Needs better blood pressure control   - Will likely need cath as inpatient once COPD and lung disease optimized   - Stressed on the importance of medication adherence     ***ATTENDING RECOMMENDATIONS TO FOLLOW*** IMPRESSION   CAD with multivessel disease with chronic stable angina on  anti-anginal therapy   Acute atypical chest pain - troponins 0.03 -> 0.03 in the setting of COPD exacerbation. Resolved s/p 1 dose of IV solumedrol   EKG with no acute ischemic changes, stable since 2018  CHF mixed type - ECHO now with EF of 30% (34% on CCTA on 2016) - Patient euvolemic   Aortic valve replacement with porcine valve     RECOMMENDATIONS   - Trend cardiac enzymes   - Continue plavix, statin and beta blocker, add a aspirin   - Continue PO lasix - increase to 60 mg  - Add Isosorbide dinitrate 20 mg q12  - Needs better blood pressure control   - Will likely need cath as outpatient once COPD and lung disease optimized   - F/u with Dr. Sawyer in the office to schedule.  - Stressed on the importance of medication adherence

## 2019-09-12 NOTE — H&P ADULT - ATTENDING COMMENTS
HPI as above.  Interval hx: pt seen and examined at bedside. No cp. Feels better, mild sob.   Vital Signs (24 Hrs):  T(C): 36.7 (09-12-19 @ 17:11), Max: 36.9 (09-12-19 @ 02:06)  HR: 83 (09-12-19 @ 17:11) (83 - 102)  BP: 119/60 (09-12-19 @ 17:11) (119/60 - 175/88)  RR: 19 (09-12-19 @ 17:11) (18 - 22)  SpO2: 95% (09-12-19 @ 17:11) (95% - 98%)  Wt(kg): --  Daily Height in cm: 160.02 (11 Sep 2019 22:56)    Daily     I&O's Summary    Exam: PHYSICAL EXAM:  GENERAL: NAD, well-developed  HEAD:  Atraumatic, Normocephalic  EYES: EOMI, PERRLA, conjunctiva and sclera clear  NECK: Supple, No JVD  CHEST/LUNG: Clear to auscultation bilaterally; No wheeze  HEART: Regular rate and rhythm; No murmurs, rubs, or gallops  ABDOMEN: Soft, Nontender, Nondistended; Bowel sounds present  EXTREMITIES:  2+ Peripheral Pulses, No clubbing, cyanosis, or edema  PSYCH: AAOx3  NEUROLOGY: non-focal  SKIN: No rashes or lesions    Labs reviewed  Imaging reviewed:  Summary:   1. Severely decreased segmental left ventricular systolic function.   2. LV Ejection Fraction by Beck's Method with a biplane EF of 30 %.   3. Increased LV wall thickness.   4.Spectral Doppler shows impaired relaxation pattern of left   ventricular myocardial filling (Grade I diastolic dysfunction).   5. Mild mitral valve regurgitation.   6. Thickening and calcification of the anterior and posterior mitral   valve leaflets.   7. Bioprosthesis in the aortic position.   8. Peak transaortic gradient equals 42.8 mmHg, mean transaortic gradient   equals 24.8 mmHg.    < end of copied text >    < from: Xray Chest 2 Views PA/Lat (09.12.19 @ 15:15) >      Impression:      No consolidation, effusion or pneumothorax.    < end of copied text >      EKG reviewed: < from: 12 Lead ECG (09.12.19 @ 09:43) >    Diagnosis Line Sinus rhythm [id number] degree A-V block with Premature supraventricular  complexes  Left axis deviation  Left bundle branch block  Abnormal ECG    < end of copied text >      Plan  #chest pain 2/2 mild acs- pt EF dropped to 30%, EF previously 45-50%, in 2016 prior to intervention 25%, dw cardio pt will need cath, admit to tele, trend trop fu cardiology, continue asa and plavix  #Copd exacerbation 2/2 uri- pt doing well on RA, continue abx, nebs and switched to po steroids   #Chronic HFrEF- not in exacerbation- new drop in ef , will need cath, continue lasix, BB and losartan, follow up cardio  #class 3 obesity morbid obesity- 36.7 bmi +htn comorbidity   #smoking cessation- counseling done.     #Progress Note Handoff  Pending (specify):  Consults____cardio, pulm_____, Tests___cath_____, Test Results_______, Other_________  Family discussion: anuj pt and agreed to plan   Disposition: Home___/SNF___/Other________/Unknown at this time____x____

## 2019-09-12 NOTE — H&P ADULT - NSHPPHYSICALEXAM_GEN_ALL_CORE
Vital Signs Last 24 Hrs  T(C): 36.9 (12 Sep 2019 02:06), Max: 36.9 (12 Sep 2019 02:06)  T(F): 98.4 (12 Sep 2019 02:06), Max: 98.4 (12 Sep 2019 02:06)  HR: 97 (12 Sep 2019 02:06) (97 - 102)  BP: 148/90 (12 Sep 2019 02:06) (148/90 - 175/88)  RR: 20 (12 Sep 2019 02:06) (18 - 22)  SpO2: 97% (12 Sep 2019 02:06) (95% - 97%)    --------    PHYSICAL EXAM:  GENERAL: NAD, speaks in full sentences, no signs of respiratory distress  HEAD:  Atraumatic, Normocephalic  EYES: EOMI, PERRLA  NECK: Supple, No JVD  CHEST/LUNG: Bilateral expiratory wheezing with cough. No rales  HEART: Regular rate and rhythm; No murmurs;   ABDOMEN: Soft, Nontender, Nondistended; Bowel sounds present; No guarding; Obese  EXTREMITIES:  2+ Peripheral Pulses, No cyanosis or edema  PSYCH: AAOx3  NEUROLOGY: non-focal  SKIN: No rashes or lesions

## 2019-09-12 NOTE — CONSULT NOTE ADULT - ASSESSMENT
IMPRESSION:    Acute exacerbation of COPD  Active smoker  Rule out ACS - LBBB and increase trop  H/O CAD and Valve heart disease s/p AVR  H/O HFpEF    PLAN:    ·	Duoneb q4h and prn, start symbicort on discharge  ·	Solumedrol 60mg IV q12h  ·	Doxycycline PO 200mg q24h x 5 days  ·	Advised smoking cessation  ·	Cardio eval, echo, serial cardiac enzymes, Tele monitoring  ·	Outpatient pulm follow up needs PFTs  ·	DVT ppx IMPRESSION:    Acute exacerbation of COPD  Active smoker  Rule out ACS - LBBB and increase trop  H/O CAD and Valve heart disease s/p AVR  H/O HFpEF    PLAN:    ·	Duoneb q4h and prn, start symbicort on discharge  ·	prednisone 40 mg daily for 5 days than 20 days for 5 days  ·	Doxycycline PO 200mg q24h x 5 days  ·	Advised smoking cessation  ·	Cardio eval, echo, serial cardiac enzymes, Tele monitoring  ·	Outpatient pulm follow up needs PFTs/ chest ct  ·	DVT ppx

## 2019-09-12 NOTE — H&P ADULT - ASSESSMENT
64 year old male with PMH of COPD not on home O2, CAD w/ stent, HFpEF, HTN, DM II, DLD presents to the ED complaining of productive cough and chest pain    # Chest tightness r/o ACS  - Started with cough  - EKG unchanged from prior  - Troponin elevated 0.03 (previous was normal)  - Significant risk factors (prior PCI, active smoker, DM, HTN, obese with likely LAW)  - Trend cardiac enzymes, serial EKGs  - Check 2D-echo  - Cardiology eval    # Mild COPD exacerbation likely due to URI  - CXR with no focal consolidation (pending report)  - Sepsis ruled out on admission. WBC 13, coughing and SOB, wheezing on exam  - Levaquin 750 mg IV daily x5 days  - Solumedrol 60 mg IV once then start Prednisone 40 mg daily x5 days  - Duoneb q6, inhalers  - Guaifenesin 600 mg q12  - Likely has overlying LAW  - Smoking cessation education  - Pulm eval (does not have a pulmonologist)    # HFpEF  - pBNP 702  - s/p Lasix 40 mg IV once in ED  - Continue PO Lasix  - Check 2D-echo    # CAD s/p multiple stents  - Continue BB, ARB, statin, plavix    # Type 2 diabetes mellitus  - Hold home PO meds  - Start insulin basal/bolus regimen  - Check fingersticks  - Carb consistent diet    # HTN  - Continue Losartan and Metoprolol  - DASH diet    # DLD - Continue Lipitor    # Tobacco use disorder  - Active daily smoker (3/4 pack daily for >20 years)  - Counselled on smoking cessation    # Class 2 obesity: BMI = 36.7  - Dietary and lifestyle modification    GI ppx: Protonix  DVT ppx: Lovenox    Dispo: from home

## 2019-09-13 ENCOUNTER — TRANSCRIPTION ENCOUNTER (OUTPATIENT)
Age: 64
End: 2019-09-13

## 2019-09-13 VITALS
TEMPERATURE: 97 F | HEART RATE: 104 BPM | SYSTOLIC BLOOD PRESSURE: 152 MMHG | DIASTOLIC BLOOD PRESSURE: 69 MMHG | RESPIRATION RATE: 18 BRPM

## 2019-09-13 LAB
ANION GAP SERPL CALC-SCNC: 15 MMOL/L — HIGH (ref 7–14)
BASOPHILS # BLD AUTO: 0.06 K/UL — SIGNIFICANT CHANGE UP (ref 0–0.2)
BASOPHILS NFR BLD AUTO: 0.4 % — SIGNIFICANT CHANGE UP (ref 0–1)
BUN SERPL-MCNC: 27 MG/DL — HIGH (ref 10–20)
CALCIUM SERPL-MCNC: 10 MG/DL — SIGNIFICANT CHANGE UP (ref 8.5–10.1)
CHLORIDE SERPL-SCNC: 98 MMOL/L — SIGNIFICANT CHANGE UP (ref 98–110)
CK MB CFR SERPL CALC: 5.8 NG/ML — SIGNIFICANT CHANGE UP (ref 0.6–6.3)
CK SERPL-CCNC: 394 U/L — HIGH (ref 0–225)
CO2 SERPL-SCNC: 26 MMOL/L — SIGNIFICANT CHANGE UP (ref 17–32)
CREAT SERPL-MCNC: 1.5 MG/DL — SIGNIFICANT CHANGE UP (ref 0.7–1.5)
EOSINOPHIL # BLD AUTO: 0.01 K/UL — SIGNIFICANT CHANGE UP (ref 0–0.7)
EOSINOPHIL NFR BLD AUTO: 0.1 % — SIGNIFICANT CHANGE UP (ref 0–8)
ESTIMATED AVERAGE GLUCOSE: 171 MG/DL — HIGH (ref 68–114)
GLUCOSE BLDC GLUCOMTR-MCNC: 238 MG/DL — HIGH (ref 70–99)
GLUCOSE BLDC GLUCOMTR-MCNC: 371 MG/DL — HIGH (ref 70–99)
GLUCOSE BLDC GLUCOMTR-MCNC: 373 MG/DL — HIGH (ref 70–99)
GLUCOSE BLDC GLUCOMTR-MCNC: 385 MG/DL — HIGH (ref 70–99)
GLUCOSE BLDC GLUCOMTR-MCNC: 399 MG/DL — HIGH (ref 70–99)
GLUCOSE BLDC GLUCOMTR-MCNC: 421 MG/DL — HIGH (ref 70–99)
GLUCOSE SERPL-MCNC: 278 MG/DL — HIGH (ref 70–99)
HBA1C BLD-MCNC: 7.6 % — HIGH (ref 4–5.6)
HCT VFR BLD CALC: 49.7 % — SIGNIFICANT CHANGE UP (ref 42–52)
HCV AB S/CO SERPL IA: 0.11 S/CO — SIGNIFICANT CHANGE UP (ref 0–0.99)
HCV AB SERPL-IMP: SIGNIFICANT CHANGE UP
HGB BLD-MCNC: 17.1 G/DL — SIGNIFICANT CHANGE UP (ref 14–18)
IMM GRANULOCYTES NFR BLD AUTO: 0.8 % — HIGH (ref 0.1–0.3)
LYMPHOCYTES # BLD AUTO: 1.06 K/UL — LOW (ref 1.2–3.4)
LYMPHOCYTES # BLD AUTO: 7.2 % — LOW (ref 20.5–51.1)
MCHC RBC-ENTMCNC: 29.6 PG — SIGNIFICANT CHANGE UP (ref 27–31)
MCHC RBC-ENTMCNC: 34.4 G/DL — SIGNIFICANT CHANGE UP (ref 32–37)
MCV RBC AUTO: 86.1 FL — SIGNIFICANT CHANGE UP (ref 80–94)
MONOCYTES # BLD AUTO: 1.01 K/UL — HIGH (ref 0.1–0.6)
MONOCYTES NFR BLD AUTO: 6.9 % — SIGNIFICANT CHANGE UP (ref 1.7–9.3)
NEUTROPHILS # BLD AUTO: 12.47 K/UL — HIGH (ref 1.4–6.5)
NEUTROPHILS NFR BLD AUTO: 84.6 % — HIGH (ref 42.2–75.2)
NRBC # BLD: 0 /100 WBCS — SIGNIFICANT CHANGE UP (ref 0–0)
PLATELET # BLD AUTO: 242 K/UL — SIGNIFICANT CHANGE UP (ref 130–400)
POTASSIUM SERPL-MCNC: 4.9 MMOL/L — SIGNIFICANT CHANGE UP (ref 3.5–5)
POTASSIUM SERPL-SCNC: 4.9 MMOL/L — SIGNIFICANT CHANGE UP (ref 3.5–5)
RBC # BLD: 5.77 M/UL — SIGNIFICANT CHANGE UP (ref 4.7–6.1)
RBC # FLD: 12.6 % — SIGNIFICANT CHANGE UP (ref 11.5–14.5)
SODIUM SERPL-SCNC: 139 MMOL/L — SIGNIFICANT CHANGE UP (ref 135–146)
TROPONIN T SERPL-MCNC: <0.01 NG/ML — SIGNIFICANT CHANGE UP
WBC # BLD: 14.73 K/UL — HIGH (ref 4.8–10.8)
WBC # FLD AUTO: 14.73 K/UL — HIGH (ref 4.8–10.8)

## 2019-09-13 PROCEDURE — 99239 HOSP IP/OBS DSCHRG MGMT >30: CPT

## 2019-09-13 PROCEDURE — 99232 SBSQ HOSP IP/OBS MODERATE 35: CPT

## 2019-09-13 RX ORDER — ESZOPICLONE 2 MG/1
0 TABLET, COATED ORAL
Qty: 30 | Refills: 0 | DISCHARGE

## 2019-09-13 RX ORDER — GLYCOPYRROLATE AND FORMOTEROL FUMARATE 9; 4.8 UG/1; UG/1
2 AEROSOL, METERED RESPIRATORY (INHALATION)
Qty: 0 | Refills: 0 | DISCHARGE

## 2019-09-13 RX ORDER — INSULIN GLARGINE 100 [IU]/ML
24 INJECTION, SOLUTION SUBCUTANEOUS EVERY MORNING
Refills: 0 | Status: DISCONTINUED | OUTPATIENT
Start: 2019-09-13 | End: 2019-09-13

## 2019-09-13 RX ORDER — OMEGA-3 ACID ETHYL ESTERS 1 G
0 CAPSULE ORAL
Qty: 360 | Refills: 0 | DISCHARGE

## 2019-09-13 RX ORDER — FUROSEMIDE 40 MG
3 TABLET ORAL
Qty: 90 | Refills: 0
Start: 2019-09-13 | End: 2019-10-12

## 2019-09-13 RX ORDER — INSULIN LISPRO 100/ML
15 VIAL (ML) SUBCUTANEOUS ONCE
Refills: 0 | Status: COMPLETED | OUTPATIENT
Start: 2019-09-13 | End: 2019-09-13

## 2019-09-13 RX ORDER — FUROSEMIDE 40 MG
1 TABLET ORAL
Qty: 0 | Refills: 0 | DISCHARGE

## 2019-09-13 RX ORDER — INSULIN LISPRO 100/ML
8 VIAL (ML) SUBCUTANEOUS ONCE
Refills: 0 | Status: COMPLETED | OUTPATIENT
Start: 2019-09-13 | End: 2019-09-13

## 2019-09-13 RX ORDER — BUDESONIDE AND FORMOTEROL FUMARATE DIHYDRATE 160; 4.5 UG/1; UG/1
1 AEROSOL RESPIRATORY (INHALATION)
Qty: 1 | Refills: 2
Start: 2019-09-13

## 2019-09-13 RX ORDER — ISOSORBIDE DINITRATE 5 MG/1
1 TABLET ORAL
Qty: 60 | Refills: 0
Start: 2019-09-13 | End: 2019-10-12

## 2019-09-13 RX ORDER — INSULIN LISPRO 100/ML
8 VIAL (ML) SUBCUTANEOUS
Refills: 0 | Status: DISCONTINUED | OUTPATIENT
Start: 2019-09-13 | End: 2019-09-13

## 2019-09-13 RX ORDER — ASPIRIN/CALCIUM CARB/MAGNESIUM 324 MG
1 TABLET ORAL
Qty: 30 | Refills: 0
Start: 2019-09-13 | End: 2019-10-12

## 2019-09-13 RX ORDER — FENOFIBRATE,MICRONIZED 130 MG
0 CAPSULE ORAL
Qty: 90 | Refills: 0 | DISCHARGE

## 2019-09-13 RX ADMIN — Medication 8 UNIT(S): at 12:29

## 2019-09-13 RX ADMIN — Medication 60 MILLIGRAM(S): at 06:01

## 2019-09-13 RX ADMIN — Medication 15 UNIT(S): at 15:54

## 2019-09-13 RX ADMIN — Medication 200 MILLIGRAM(S): at 06:01

## 2019-09-13 RX ADMIN — Medication 3 MILLILITER(S): at 14:21

## 2019-09-13 RX ADMIN — Medication 8 UNIT(S): at 10:00

## 2019-09-13 RX ADMIN — Medication 81 MILLIGRAM(S): at 12:26

## 2019-09-13 RX ADMIN — Medication 110 MILLIGRAM(S): at 06:03

## 2019-09-13 RX ADMIN — INSULIN GLARGINE 24 UNIT(S): 100 INJECTION, SOLUTION SUBCUTANEOUS at 10:56

## 2019-09-13 RX ADMIN — ISOSORBIDE DINITRATE 20 MILLIGRAM(S): 5 TABLET ORAL at 06:01

## 2019-09-13 RX ADMIN — CHLORHEXIDINE GLUCONATE 1 APPLICATION(S): 213 SOLUTION TOPICAL at 06:02

## 2019-09-13 RX ADMIN — PANTOPRAZOLE SODIUM 40 MILLIGRAM(S): 20 TABLET, DELAYED RELEASE ORAL at 06:01

## 2019-09-13 RX ADMIN — Medication 40 MILLIGRAM(S): at 06:03

## 2019-09-13 RX ADMIN — LOSARTAN POTASSIUM 100 MILLIGRAM(S): 100 TABLET, FILM COATED ORAL at 06:01

## 2019-09-13 RX ADMIN — ENOXAPARIN SODIUM 40 MILLIGRAM(S): 100 INJECTION SUBCUTANEOUS at 12:26

## 2019-09-13 RX ADMIN — CLOPIDOGREL BISULFATE 75 MILLIGRAM(S): 75 TABLET, FILM COATED ORAL at 12:26

## 2019-09-13 RX ADMIN — Medication 600 MILLIGRAM(S): at 06:01

## 2019-09-13 RX ADMIN — Medication 145 MILLIGRAM(S): at 12:26

## 2019-09-13 RX ADMIN — Medication 3 MILLILITER(S): at 08:24

## 2019-09-13 NOTE — PROGRESS NOTE ADULT - ASSESSMENT
IMPRESSION:    Acute exacerbation of COPD  Active smoker  Rule out ACS - LBBB and increase trop/ low ef  H/O CAD and Valve heart disease s/p AVR  H/O HFpEF    PLAN:    ·	Duoneb q4h and prn, start symbicort on discharge  ·	prednisone 40 mg daily for 5 days than 20 days for 5 days  ·	Doxycycline PO 200mg q24h x 5 days  ·	Advised smoking cessation  ·	lasix  ·	cardio f/up  ·	Outpatient pulm follow up needs PFTs/ chest ct  ·	DVT ppx

## 2019-09-13 NOTE — DISCHARGE NOTE NURSING/CASE MANAGEMENT/SOCIAL WORK - NSDCPEEMAIL_GEN_ALL_CORE
[facility] email [email or web address]

## 2019-09-13 NOTE — DISCHARGE NOTE PROVIDER - PROVIDER TOKENS
PROVIDER:[TOKEN:[[id number]],FOLLOWUP:[1 week]],PROVIDER:[TOKEN:[[id number]],FOLLOWUP:[1 week]],PROVIDER:[TOKEN:[[id number]],FOLLOWUP:[1 week]]

## 2019-09-13 NOTE — CHART NOTE - NSCHARTNOTEFT_GEN_A_CORE
Patient eloped prior to discharge. Patient left the room and the building prior to receiving his discharge papers. His appointments and medication changes were explained prior to his elopement. His medications have been sent to his pharmacy. However, patient left while appropriately managing his blood sugar prior to discharge. He left the building with the IV on his Left arm on him, unremoved. Attempts were made to call the patient's family members but none of the numbers were available and the patient could not be found inside the building. Patient eloped prior to discharge. Patient left the room and the building prior to receiving his discharge papers. His appointments and medication changes were explained prior to his elopement. His medications have been sent to his pharmacy. However, patient left while appropriately managing his blood sugar prior to discharge. He left the building with the IV on his Left arm on him, unremoved. Attempts were made to call the patient's family members but none of the numbers were available. Nurse Vega and other nursing staff aware and attempts were made to find the patient but could not be found inside the building.

## 2019-09-13 NOTE — DISCHARGE NOTE PROVIDER - NSDCFUADDAPPT_GEN_ALL_CORE_FT
Please follow up with your primary care physician within one week upon discharge    Please follow up with your cardiologist within one week upon discharge for possible catheterization    Please follow up with your pulmonologist for COPD medication optimization and possible CT scan of chest and pulmonary function test

## 2019-09-13 NOTE — DISCHARGE NOTE PROVIDER - NSDCCPCAREPLAN_GEN_ALL_CORE_FT
PRINCIPAL DISCHARGE DIAGNOSIS  Diagnosis: COPD exacerbation  Assessment and Plan of Treatment: - You were admitted for exacerbation of your COPD  - You were started on steroids and will continue to take 40mg daily until 9/17/19 then take 20mg daily until 9/22/19 then stop  - Continue to take doxycycline 200mg daily until 9/17/19  - You will need outpatient follow up for pulmonary function testing and imaging / screening for malignancies through CT Chest  - Follow up with your pulmonologist and primary care provider on discharge      SECONDARY DISCHARGE DIAGNOSES  Diagnosis: Chest pressure  Assessment and Plan of Treatment: - You were found to have decreased pumping function of your heart  - Continue to take your antihypertensive medications as prescribed  - Once your COPD is optimized, you are recommended to have a cardiac catheterization  - Follow up with Dr. Sawyer by next week for plan of catheterization PRINCIPAL DISCHARGE DIAGNOSIS  Diagnosis: COPD exacerbation  Assessment and Plan of Treatment: - You were admitted for exacerbation of your COPD  - You were started on steroids and will continue to take 40mg daily until 9/17/19 then take 20mg daily until 9/22/19 then stop  - Continue to take doxycycline 200mg daily until 9/17/19  - You will need outpatient follow up for pulmonary function testing and imaging / screening for malignancies through CT Chest  - Follow up with your pulmonologist and primary care provider on discharge      SECONDARY DISCHARGE DIAGNOSES  Diagnosis: Chest pressure  Assessment and Plan of Treatment: - You were found to have decreased pumping function of your heart  - Continue to take your antihypertensive medications as prescribed  - continue new medications as per this list, please bring this paper to your cardiologist.   - Once your COPD is optimized, you are recommended to have a cardiac catheterization  - Follow up with Dr. Sawyer by next week for plan of catheterization

## 2019-09-13 NOTE — DISCHARGE NOTE PROVIDER - CARE PROVIDERS DIRECT ADDRESSES
,DirectAddress_Unknown,[email or web address],DirectAddress_Unknown

## 2019-09-13 NOTE — PROGRESS NOTE ADULT - SUBJECTIVE AND OBJECTIVE BOX
OVERNIGHT EVENTS: events noted, echo reviewed, EF 30%, occ cough sp cardio eval    Vital Signs Last 24 Hrs  T(C): 36.2 (13 Sep 2019 05:46), Max: 36.7 (12 Sep 2019 17:11)  T(F): 97.1 (13 Sep 2019 05:46), Max: 98 (12 Sep 2019 17:11)  HR: 78 (13 Sep 2019 05:46) (78 - 85)  BP: 134/72 (13 Sep 2019 05:46) (119/60 - 162/79)  RR: 18 (13 Sep 2019 05:46) (18 - 19)  SpO2: 94% (12 Sep 2019 19:35) (94% - 95%)    PHYSICAL EXAMINATION:    GENERAL: The patient is awake and alert in no apparent distress.     HEENT: Head is normocephalic and atraumatic. Extraocular muscles are intact. Mucous membranes are moist.    NECK: Supple.    LUNGS: improved wheezing    HEART: Regular rate and rhythm without murmur.    ABDOMEN: Soft, nontender, and nondistended.      EXTREMITIES: Without any cyanosis, clubbing, rash, lesions or edema.    NEUROLOGIC: Grossly intact.    SKIN: No ulceration or induration present.      LABS:                        17.1   14.73 )-----------( 242      ( 13 Sep 2019 05:27 )             49.7     09-13    139  |  98  |  27<H>  ----------------------------<  278<H>  4.9   |  26  |  1.5    Ca    10.0      13 Sep 2019 05:27    TPro  6.6  /  Alb  4.1  /  TBili  0.6  /  DBili  x   /  AST  18  /  ALT  27  /  AlkPhos  59  09-12          CARDIAC MARKERS ( 13 Sep 2019 05:27 )  x     / <0.01 ng/mL / 394 U/L / x     / 5.8 ng/mL  CARDIAC MARKERS ( 12 Sep 2019 12:09 )  x     / 0.03 ng/mL / x     / x     / x      CARDIAC MARKERS ( 12 Sep 2019 07:28 )  x     / 0.03 ng/mL / 257 U/L / x     / 3.0 ng/mL  CARDIAC MARKERS ( 12 Sep 2019 00:18 )  x     / 0.03 ng/mL / x     / x     / x            Serum Pro-Brain Natriuretic Peptide: 702 pg/mL (09-12-19 @ 00:18)            09-12-19 @ 07:01  -  09-13-19 @ 07:00  --------------------------------------------------------  IN: 80 mL / OUT: 0 mL / NET: 80 mL        MICROBIOLOGY:      MEDICATIONS  (STANDING):  ALBUTerol/ipratropium for Nebulization 3 milliLiter(s) Nebulizer every 4 hours  aspirin enteric coated 81 milliGRAM(s) Oral daily  atorvastatin 40 milliGRAM(s) Oral at bedtime  chlorhexidine 4% Liquid 1 Application(s) Topical <User Schedule>  clopidogrel Tablet 75 milliGRAM(s) Oral daily  dextrose 5%. 1000 milliLiter(s) (50 mL/Hr) IV Continuous <Continuous>  dextrose 50% Injectable 12.5 Gram(s) IV Push once  dextrose 50% Injectable 25 Gram(s) IV Push once  dextrose 50% Injectable 25 Gram(s) IV Push once  doxycycline IVPB 100 milliGRAM(s) IV Intermittent every 12 hours  enoxaparin Injectable 40 milliGRAM(s) SubCutaneous daily  fenofibrate Tablet 145 milliGRAM(s) Oral daily  furosemide    Tablet 60 milliGRAM(s) Oral daily  guaiFENesin  milliGRAM(s) Oral every 12 hours  influenza   Vaccine 0.5 milliLiter(s) IntraMuscular once  insulin glargine Injectable (LANTUS) 18 Unit(s) SubCutaneous every morning  insulin lispro Injectable (HumaLOG) 5 Unit(s) SubCutaneous three times a day before meals  isosorbide   dinitrate Tablet (ISORDIL) 20 milliGRAM(s) Oral two times a day  losartan 100 milliGRAM(s) Oral daily  metoprolol succinate  milliGRAM(s) Oral daily  pantoprazole    Tablet 40 milliGRAM(s) Oral before breakfast  predniSONE   Tablet 40 milliGRAM(s) Oral daily    MEDICATIONS  (PRN):  dextrose 40% Gel 15 Gram(s) Oral once PRN Blood Glucose LESS THAN 70 milliGRAM(s)/deciliter  glucagon  Injectable 1 milliGRAM(s) IntraMuscular once PRN Glucose LESS THAN 70 milligrams/deciliter  zolpidem 5 milliGRAM(s) Oral at bedtime PRN Insomnia      RADIOLOGY & ADDITIONAL STUDIES:

## 2019-09-13 NOTE — DISCHARGE NOTE NURSING/CASE MANAGEMENT/SOCIAL WORK - PATIENT PORTAL LINK FT
You can access the FollowMyHealth Patient Portal offered by [facility] by registering at the following website: [email or web address]. By joining [employer]’s FollowMyHealth portal, you will also be able to view your health information using other applications (apps) compatible with our system.

## 2019-09-13 NOTE — PROGRESS NOTE ADULT - ASSESSMENT
C/w medical therapy for CAD  Stable chronic angina  Reduced EF - prior 34% from CCTA, 30% on echo this admission.  Options discussed with the patient.  Recommend cardiac catheterization, once COPD exacerbation is treated.  Patient will f/u Dr. Sawyer next week to set-up the catheterization.  Compliance with medical therapy and follow-up discussed.  The patient was advised to stop smoking.

## 2019-09-13 NOTE — PROGRESS NOTE ADULT - ASSESSMENT
64 year old male with PMH of COPD not on home O2, CAD w/ stent, HFpEF, HTN, DM II, DLD presents to the ED complaining of productive cough and chest pain    A/P:   Acute COPD exacerbation :   CXR with no focal consolidation   Pulmonary recommended Prednisone 40mg po for 5 days then 20mg PO for another 5 days.   Doxycycline 100mg BID.   Start on Symbicort.     Chest pain: stable angina and possible worsening with acute bronchitis  CAD s/p PCI  Troponin trends down 0.01, EKG no ischemic changes  Continue ASA, Toprol and Lipitor.      Chronic HFrEF:   LVEF 30% as per cardiology it is chronic, it was 34% on previous CCTA.   Continue Lasix, Losartan 100mg and Metoprolol XL 200mg daily.   Cardiac cath outpatient    Type 2 diabetes mellitus  Continue home medications.     HTN  Continue Losartan and Metoprolol  DASH diet    Tobacco use disorder  Counselled on smoking cessation    #Progress Note Handoff:  Pending (specify):  none  Family discussion:  Disposition: Home today

## 2019-09-13 NOTE — DISCHARGE NOTE PROVIDER - CARE PROVIDER_API CALL
Martin Shaver ()  Internal Medicine  [address] [place] [address]  Phone: [phone]  Fax: [phone]  Follow Up Time: 1 week    Gautam Sawyer)  Cardiovascular Disease; Internal Medicine; Interventional Cardiology  [address] [place] [address]  Phone: [phone]  Fax: [phone]  Follow Up Time: 1 week    Morgan Atkinson)  Critical Care Medicine; Internal Medicine; Pulmonary Disease; Sleep Medicine  [address] [place] [address]  Phone: [phone]  Fax: [phone]  Follow Up Time: 1 week

## 2019-09-13 NOTE — DISCHARGE NOTE PROVIDER - HOSPITAL COURSE
64 year old male with PMH of COPD not on home O2, CAD w/ stent, HFpEF, HTN, DM II, DLD presented to the ED complaining of productive cough and chest pain. Cough started on day of presentation with subjective fever and associated chest pain that feels like chest tightness and worse on exertion. Patient is a current daily smoker of 3/4 of pack daily for > 20 years, last cigarette was on day prior to presentation. Patient does not have a pulmonologist.    In ED: VSS, WBC 13, Troponin 0.03, pBNP 702. CXR with no focal consolidation. Was given Lasix 40 IV once    Troponins were 0.03 x3 and 0.01    Echo showed decreased systolic function and EF 30%, Grade 1 Diastolic Dysfunction.    As per cardio, patient can continue w/ medical therapy. Patient has chronic stable angina.    Cardiac catheterization is recommended once COPD exacerbation is optimized and treated.    Patient will f/u Dr. Sawyer next week to set up catheterization.    Pulmonology recommended to start the patient on symbicort on discharge.    Patient is to be started on 40mg qd for 5d then 20mg for 5d.    Patient is to finish doxycycline PO 200mg q24h for 5d.    Patient will need to f/u outpatient for PFT and CT chest.    Patient is stable for discharge.

## 2019-09-13 NOTE — DISCHARGE NOTE PROVIDER - INSTRUCTIONS
Maintain a low salt diet and diet rich in nutrients such as magnesium, calcium, and potassium which may help lower your blood pressure.

## 2019-09-13 NOTE — DISCHARGE NOTE NURSING/CASE MANAGEMENT/SOCIAL WORK - NSDCPEWEB_GEN_ALL_CORE
[facility] for Tobacco Control website --- [email or web address] website --- [email or web address]

## 2019-09-13 NOTE — PROGRESS NOTE ADULT - SUBJECTIVE AND OBJECTIVE BOX
MONIQUE LYONS  64y  Male      Patient is a 64y old  Male who presents with a chief complaint of Chest pain (13 Sep 2019 10:44)      INTERVAL HPI/OVERNIGHT EVENTS:    Vital Signs Last 24 Hrs  T(C): 36.2 (13 Sep 2019 05:46), Max: 36.7 (12 Sep 2019 17:11)  T(F): 97.1 (13 Sep 2019 05:46), Max: 98 (12 Sep 2019 17:11)  HR: 78 (13 Sep 2019 05:46) (78 - 85)  BP: 134/72 (13 Sep 2019 05:46) (119/60 - 162/79)  BP(mean): --  RR: 18 (13 Sep 2019 05:46) (18 - 19)  SpO2: 94% (12 Sep 2019 19:35) (94% - 95%)      09-12-19 @ 07:01  -  09-13-19 @ 07:00  --------------------------------------------------------  IN: 80 mL / OUT: 0 mL / NET: 80 mL    09-13-19 @ 07:01  -  09-13-19 @ 13:05  --------------------------------------------------------  IN: 260 mL / OUT: 200 mL / NET: 60 mL            Consultant(s) Notes Reviewed:  [x ] YES  [ ] NO          MEDICATIONS  (STANDING):  ALBUTerol/ipratropium for Nebulization 3 milliLiter(s) Nebulizer every 4 hours  aspirin enteric coated 81 milliGRAM(s) Oral daily  atorvastatin 40 milliGRAM(s) Oral at bedtime  chlorhexidine 4% Liquid 1 Application(s) Topical <User Schedule>  clopidogrel Tablet 75 milliGRAM(s) Oral daily  dextrose 5%. 1000 milliLiter(s) (50 mL/Hr) IV Continuous <Continuous>  dextrose 50% Injectable 12.5 Gram(s) IV Push once  dextrose 50% Injectable 25 Gram(s) IV Push once  dextrose 50% Injectable 25 Gram(s) IV Push once  doxycycline IVPB 100 milliGRAM(s) IV Intermittent every 12 hours  enoxaparin Injectable 40 milliGRAM(s) SubCutaneous daily  fenofibrate Tablet 145 milliGRAM(s) Oral daily  furosemide    Tablet 60 milliGRAM(s) Oral daily  guaiFENesin  milliGRAM(s) Oral every 12 hours  influenza   Vaccine 0.5 milliLiter(s) IntraMuscular once  insulin glargine Injectable (LANTUS) 24 Unit(s) SubCutaneous every morning  insulin lispro Injectable (HumaLOG) 8 Unit(s) SubCutaneous three times a day before meals  isosorbide   dinitrate Tablet (ISORDIL) 20 milliGRAM(s) Oral two times a day  losartan 100 milliGRAM(s) Oral daily  metoprolol succinate  milliGRAM(s) Oral daily  pantoprazole    Tablet 40 milliGRAM(s) Oral before breakfast  predniSONE   Tablet 40 milliGRAM(s) Oral daily    MEDICATIONS  (PRN):  dextrose 40% Gel 15 Gram(s) Oral once PRN Blood Glucose LESS THAN 70 milliGRAM(s)/deciliter  glucagon  Injectable 1 milliGRAM(s) IntraMuscular once PRN Glucose LESS THAN 70 milligrams/deciliter  zolpidem 5 milliGRAM(s) Oral at bedtime PRN Insomnia      LABS                          17.1   14.73 )-----------( 242      ( 13 Sep 2019 05:27 )             49.7     09-13    139  |  98  |  27<H>  ----------------------------<  278<H>  4.9   |  26  |  1.5    Ca    10.0      13 Sep 2019 05:27    TPro  6.6  /  Alb  4.1  /  TBili  0.6  /  DBili  x   /  AST  18  /  ALT  27  /  AlkPhos  59  09-12          Lactate Trend    CARDIAC MARKERS ( 13 Sep 2019 05:27 )  x     / <0.01 ng/mL / 394 U/L / x     / 5.8 ng/mL  CARDIAC MARKERS ( 12 Sep 2019 12:09 )  x     / 0.03 ng/mL / x     / x     / x      CARDIAC MARKERS ( 12 Sep 2019 07:28 )  x     / 0.03 ng/mL / 257 U/L / x     / 3.0 ng/mL  CARDIAC MARKERS ( 12 Sep 2019 00:18 )  x     / 0.03 ng/mL / x     / x     / x          CAPILLARY BLOOD GLUCOSE      POCT Blood Glucose.: 373 mg/dL (13 Sep 2019 11:32)        RADIOLOGY & ADDITIONAL TESTS:    Imaging Personally Reviewed:  [ ] YES  [ ] NO    HEALTH ISSUES - PROBLEM Dx:        PHYSICAL EXAM:  GENERAL: NAD, well-developed  HEAD:  Atraumatic, Normocephalic  EYES: EOMI, PERRLA, conjunctiva and sclera clear  NECK: Supple, No JVD  CHEST/LUNG: prolonged expiration.   HEART: Regular rate and rhythm; S1 S2  ABDOMEN: Soft, Nontender, Nondistended; Bowel sounds present  EXTREMITIES:  2+ Peripheral Pulses, No clubbing, cyanosis, or edema  PSYCH: AAOx3  NEUROLOGY: non-focal  SKIN: No rashes or lesions

## 2019-09-16 PROBLEM — J44.9 CHRONIC OBSTRUCTIVE PULMONARY DISEASE, UNSPECIFIED: Chronic | Status: ACTIVE | Noted: 2019-09-12

## 2019-09-18 DIAGNOSIS — J44.1 CHRONIC OBSTRUCTIVE PULMONARY DISEASE WITH (ACUTE) EXACERBATION: ICD-10-CM

## 2019-09-18 DIAGNOSIS — Z79.84 LONG TERM (CURRENT) USE OF ORAL HYPOGLYCEMIC DRUGS: ICD-10-CM

## 2019-09-18 DIAGNOSIS — R79.89 OTHER SPECIFIED ABNORMAL FINDINGS OF BLOOD CHEMISTRY: ICD-10-CM

## 2019-09-18 DIAGNOSIS — I11.0 HYPERTENSIVE HEART DISEASE WITH HEART FAILURE: ICD-10-CM

## 2019-09-18 DIAGNOSIS — E66.01 MORBID (SEVERE) OBESITY DUE TO EXCESS CALORIES: ICD-10-CM

## 2019-09-18 DIAGNOSIS — Z88.2 ALLERGY STATUS TO SULFONAMIDES: ICD-10-CM

## 2019-09-18 DIAGNOSIS — J06.9 ACUTE UPPER RESPIRATORY INFECTION, UNSPECIFIED: ICD-10-CM

## 2019-09-18 DIAGNOSIS — I50.42 CHRONIC COMBINED SYSTOLIC (CONGESTIVE) AND DIASTOLIC (CONGESTIVE) HEART FAILURE: ICD-10-CM

## 2019-09-18 DIAGNOSIS — I44.7 LEFT BUNDLE-BRANCH BLOCK, UNSPECIFIED: ICD-10-CM

## 2019-09-18 DIAGNOSIS — Z79.899 OTHER LONG TERM (CURRENT) DRUG THERAPY: ICD-10-CM

## 2019-09-18 DIAGNOSIS — F17.210 NICOTINE DEPENDENCE, CIGARETTES, UNCOMPLICATED: ICD-10-CM

## 2019-09-18 DIAGNOSIS — G47.33 OBSTRUCTIVE SLEEP APNEA (ADULT) (PEDIATRIC): ICD-10-CM

## 2019-09-18 DIAGNOSIS — I25.118 ATHEROSCLEROTIC HEART DISEASE OF NATIVE CORONARY ARTERY WITH OTHER FORMS OF ANGINA PECTORIS: ICD-10-CM

## 2019-09-18 DIAGNOSIS — R06.89 OTHER ABNORMALITIES OF BREATHING: ICD-10-CM

## 2019-09-18 DIAGNOSIS — Z95.2 PRESENCE OF PROSTHETIC HEART VALVE: ICD-10-CM

## 2019-09-18 DIAGNOSIS — E11.9 TYPE 2 DIABETES MELLITUS WITHOUT COMPLICATIONS: ICD-10-CM

## 2019-09-18 DIAGNOSIS — E78.5 HYPERLIPIDEMIA, UNSPECIFIED: ICD-10-CM

## 2019-09-18 DIAGNOSIS — Z95.5 PRESENCE OF CORONARY ANGIOPLASTY IMPLANT AND GRAFT: ICD-10-CM

## 2019-09-25 ENCOUNTER — APPOINTMENT (OUTPATIENT)
Dept: CARDIOLOGY | Facility: CLINIC | Age: 64
End: 2019-09-25
Payer: MEDICARE

## 2019-09-25 PROCEDURE — 93000 ELECTROCARDIOGRAM COMPLETE: CPT

## 2019-09-25 PROCEDURE — 99214 OFFICE O/P EST MOD 30 MIN: CPT

## 2019-10-02 ENCOUNTER — OUTPATIENT (OUTPATIENT)
Dept: OUTPATIENT SERVICES | Facility: HOSPITAL | Age: 64
LOS: 1 days | Discharge: HOME | End: 2019-10-02
Payer: MEDICARE

## 2019-10-02 DIAGNOSIS — Z79.82 LONG TERM (CURRENT) USE OF ASPIRIN: ICD-10-CM

## 2019-10-02 DIAGNOSIS — Z95.5 PRESENCE OF CORONARY ANGIOPLASTY IMPLANT AND GRAFT: ICD-10-CM

## 2019-10-02 DIAGNOSIS — Z95.5 PRESENCE OF CORONARY ANGIOPLASTY IMPLANT AND GRAFT: Chronic | ICD-10-CM

## 2019-10-02 DIAGNOSIS — I50.9 HEART FAILURE, UNSPECIFIED: ICD-10-CM

## 2019-10-02 DIAGNOSIS — Z95.2 PRESENCE OF PROSTHETIC HEART VALVE: Chronic | ICD-10-CM

## 2019-10-02 DIAGNOSIS — Z79.02 LONG TERM (CURRENT) USE OF ANTITHROMBOTICS/ANTIPLATELETS: ICD-10-CM

## 2019-10-02 DIAGNOSIS — F17.210 NICOTINE DEPENDENCE, CIGARETTES, UNCOMPLICATED: ICD-10-CM

## 2019-10-02 DIAGNOSIS — E11.9 TYPE 2 DIABETES MELLITUS WITHOUT COMPLICATIONS: ICD-10-CM

## 2019-10-02 DIAGNOSIS — J44.9 CHRONIC OBSTRUCTIVE PULMONARY DISEASE, UNSPECIFIED: ICD-10-CM

## 2019-10-02 DIAGNOSIS — Z79.84 LONG TERM (CURRENT) USE OF ORAL HYPOGLYCEMIC DRUGS: ICD-10-CM

## 2019-10-02 DIAGNOSIS — E78.49 OTHER HYPERLIPIDEMIA: ICD-10-CM

## 2019-10-02 DIAGNOSIS — N19 UNSPECIFIED KIDNEY FAILURE: ICD-10-CM

## 2019-10-02 DIAGNOSIS — Z88.2 ALLERGY STATUS TO SULFONAMIDES: ICD-10-CM

## 2019-10-02 DIAGNOSIS — I10 ESSENTIAL (PRIMARY) HYPERTENSION: ICD-10-CM

## 2019-10-02 DIAGNOSIS — I25.10 ATHEROSCLEROTIC HEART DISEASE OF NATIVE CORONARY ARTERY WITHOUT ANGINA PECTORIS: ICD-10-CM

## 2019-10-02 DIAGNOSIS — Z95.2 PRESENCE OF PROSTHETIC HEART VALVE: ICD-10-CM

## 2019-10-02 LAB
GLUCOSE BLDC GLUCOMTR-MCNC: 88 MG/DL — SIGNIFICANT CHANGE UP (ref 70–99)
HCT VFR BLD CALC: 51.3 % — SIGNIFICANT CHANGE UP (ref 42–52)
HGB BLD-MCNC: 17.1 G/DL — SIGNIFICANT CHANGE UP (ref 14–18)
MCHC RBC-ENTMCNC: 29.3 PG — SIGNIFICANT CHANGE UP (ref 27–31)
MCHC RBC-ENTMCNC: 33.3 G/DL — SIGNIFICANT CHANGE UP (ref 32–37)
MCV RBC AUTO: 88 FL — SIGNIFICANT CHANGE UP (ref 80–94)
NRBC # BLD: 0 /100 WBCS — SIGNIFICANT CHANGE UP (ref 0–0)
PLATELET # BLD AUTO: 232 K/UL — SIGNIFICANT CHANGE UP (ref 130–400)
RBC # BLD: 5.83 M/UL — SIGNIFICANT CHANGE UP (ref 4.7–6.1)
RBC # FLD: 12.8 % — SIGNIFICANT CHANGE UP (ref 11.5–14.5)
WBC # BLD: 14.52 K/UL — HIGH (ref 4.8–10.8)
WBC # FLD AUTO: 14.52 K/UL — HIGH (ref 4.8–10.8)

## 2019-10-02 PROCEDURE — 93454 CORONARY ARTERY ANGIO S&I: CPT | Mod: 26

## 2019-10-02 RX ORDER — FENOFIBRIC ACID 105 MG/1
1 TABLET ORAL
Qty: 0 | Refills: 0 | DISCHARGE

## 2019-10-02 RX ORDER — ASPIRIN/CALCIUM CARB/MAGNESIUM 324 MG
1 TABLET ORAL
Qty: 30 | Refills: 0
Start: 2019-10-02 | End: 2019-10-31

## 2019-10-02 NOTE — PROGRESS NOTE ADULT - SUBJECTIVE AND OBJECTIVE BOX
Cardiology Follow up    MONIQUE LYONS   64y Male  PAST MEDICAL & SURGICAL HISTORY:  Stented coronary artery  HTN (hypertension)  Chronic obstructive pulmonary disease (COPD)  CAD (coronary artery disease)  Diabetes  CHF (congestive heart failure)  Heart valve replaced  H/O heart artery stent       HPI:  64 yrs old with Hx od CAD s/p PCI , AVR in 2007 presented for elective LHC.  Echo revealed low EF.  pt is on asa plavix , took meds this AM. (02 Oct 2019 07:22)    Allergies    sulfa drugs (Unknown)    Patient without complaints. Pt ambulated without issues/symptoms  Denies CP, SOB, palpitations, or dizziness    HR: 70  BP: 130/74  RR: 12  SpO2: 98% on RA    MEDICATIONS  (STANDING):    MEDICATIONS  (PRN):      REVIEW OF SYSTEMS:          CONSTITUTIONAL: No weakness, fevers or chills          EYES/ENT: No visual changes;  No vertigo or throat pain           NECK: No pain or stiffness          RESPIRATORY: No cough, wheezing, hemoptysis          CARDIOVASCULAR: no pain, no LESLIE, no palpitations           GASTROINTESTINAL: No abdominal or epigastric pain. No nausea, vomiting, or hematemesis;           GENITOURINARY: No dysuria, frequency or hematuria          NEUROLOGICAL: No numbness or weakness          SKIN: No itching, rashes    PHYSICAL EXAM:           CONSTITUTIONAL: Well-developed; well-nourished; in no acute distress  	SKIN: warm, dry  	HEAD: Normocephalic; atraumatic  	EYES: PERRL.  	ENT: No nasal discharge, airway clear, mucous membranes moist  	NECK: Supple; non tender.  	CARD: +S1, +S2, no murmurs, gallops, or rubs. Regular rate and rhythm    	RESP: No wheezes, rales or rhonchi. CTA B/L  	ABD: soft ntnd, + BS x 4 quadrants  	EXT: moves all extremities,  no clubbing, cyanosis or edema  	NEURO: Alert and oriented x3, no focal deficits          PSYCH: Cooperative, appropriate          VASCULAR:  +2 Rad / +2PTs / + 2DPs          EXTREMITY:               Right Radial: Dressing removed, access site soft, no hematoma, no pain, + pulses, no numbness            ECG:   NSR                                                                                                                LABS:                        17.1   14.52 )-----------( 232      ( 02 Oct 2019 07:04 )             51.3       A/P:  I discussed the case with Cardiologist Dr. Gutiérrez and recommend the following:    s/p PCI m/p LAD MANN/ 1 Diag POBA                     Hold Metformin x 48 after Cardiac Cath                   Continue DAPT ( Aspirin 81 mg daily and Plavix 75 mg daily ), B-Blocker, Statin Therapy                   Patient given 30 day supply of ( Aspirin 81 mg daily and Plavix 75 mg daily ) to take at home                   Patient agreeing to take DAPT for at least one year or as directed by cardiologist                    Pt given instructions on importance of taking antiplatelet medication or risk acute stent thrombosis/death                   Post cath instructions, access site care and activity restrictions reviewed with patient                     Discussed with patient to return to hospital if experience chest pain, shortness breath, dizziness and site bleeding                   Aggressive risk factor modification, diet counseling, smoking cessation discussed with patient                       Can discharge patient from cardiac standpoint at 4 pm after ambulating without symptoms and access site wnl                    Follow up with Cardiology Dr. Gutiérrez in one week.  Instructed to call and make an appointment

## 2019-10-02 NOTE — CHART NOTE - NSCHARTNOTEFT_GEN_A_CORE
Pre cath note:    indication:  [ ] STEMI                [ ] NSTEMI                 [ ] Acute coronary syndrome                     [ ]Unstable Angina   [ ] high risk  [ ] intermediate risk  [ ] low risk                     [ ] Stable Angina     non-invasive testing:      Echo low EF                    Date:                     result: [ ] high risk  [ ] intermediate risk  [ ] low risk    Anti- Anginal medications:                    [ ] not used                       [x ] used                   [ ] not used but strong indication not to use    Ejection Fraction                   [ ] <29            [ x] 30-39%   [ ] 40-49%     [ ]>50%    CHF                   [ ] active (within last 14 days on meds   [x ] Chronic (on meds but no exacerbation)    COPD                   [ ] mild (on chronic bronchodilators)  [ ] moderate (on chronic steroid therapy)      [ ] severe (indication for home O2 or PACO2 >50)    Other risk factors:                       [ ] Previous MI                     [ ] CVA/ stroke                    [ ] carotid stent/ CEA                    [ ] PVD/PAD- (arterial aneurysm, non-palpable pulses, tortuous vessel with inability to insert catheter, infra-renal dissection, renal or subclavian artery stenosis)                    [x ] diabetic                    [ ] previous CABG                    [x ] Renal Failure                           17.1   14.52 )-----------( 232      ( 02 Oct 2019 07:04 )             51.3

## 2019-10-02 NOTE — H&P CARDIOLOGY - HISTORY OF PRESENT ILLNESS
64 yrs old with Hx od CAD s/p PCI , AVR in 2007 presented for elective LHC.  Echo revealed low EF.  pt is on asa plavix , took meds this AM.

## 2019-10-02 NOTE — ASU PATIENT PROFILE, ADULT - PMH
CAD (coronary artery disease)    CHF (congestive heart failure)    Chronic obstructive pulmonary disease (COPD)    Diabetes    HTN (hypertension)    Stented coronary artery

## 2019-10-02 NOTE — CHART NOTE - NSCHARTNOTEFT_GEN_A_CORE
PRE-OP DIAGNOSIS: CAD , low ED    PROCEDURE: [facility] with coronary angiography    Physician: Dr Alonzo Caputo  Assistant: Pablo    ANESTHESIA TYPE:  [  ]General Anesthesia  [ x ] Sedation  [  ] Local/Regional    ESTIMATED BLOOD LOSS:    10   mL    CONDITION  [  ] Critical  [  ] Serious  [  ]Fair  [ x ]Good    IV CONTRAST:    250   mL    FINDINGS  Left Heart Catheterization:  LVEF%: 30% by echo  LVEDP:  [ ] Normal Coronary Arteries  [ ] Luminal Irregularities  [ ] Non-obstructive CAD    ACCESS:    [ x] right radial artery  [ ] right femoral artery    LEFT HEART CATHETERIZATION                                    Left main: no disease  LAD:  80% lesion at the bifurcation with D2  Left Circumflex: mild atherosclerosis  Right Coronary Artery: 90% lesion in mid segment      INTERVENTION  IMPLANTS: 2 MANN    POST-OP DIAGNOSIS  2 vessels CAD , s/p PCI to mid LAD , and PTCA to D2.  staged PCI to RCA in 2 weeks.        PLAN OF CARE  [ x] D/C Home today  [ ]  D/C in AM  [ ] Return to In-patient bed  [ ] Admit for observation  [ ] Return for staged procedure:  [ ] CT Surgery consult called  [ x]  Continue DAPT, B-blocker & Statin therapy

## 2019-10-02 NOTE — CHART NOTE - NSCHARTNOTEFT_GEN_A_CORE
PREOPERATIVE DAY OF PROCEDURE EVALUATION:  I have personally seen and examined the patient.  I agree with the history and physical which I have reviewed and noted any changes below.  (Signed electronically by [email or web address])  10-02-19 @ 07:25

## 2019-10-07 ENCOUNTER — LABORATORY RESULT (OUTPATIENT)
Age: 64
End: 2019-10-07

## 2019-10-07 ENCOUNTER — APPOINTMENT (OUTPATIENT)
Dept: CARDIOLOGY | Facility: CLINIC | Age: 64
End: 2019-10-07
Payer: MEDICARE

## 2019-10-07 ENCOUNTER — OUTPATIENT (OUTPATIENT)
Dept: OUTPATIENT SERVICES | Facility: HOSPITAL | Age: 64
LOS: 1 days | Discharge: HOME | End: 2019-10-07

## 2019-10-07 DIAGNOSIS — R94.31 ABNORMAL ELECTROCARDIOGRAM [ECG] [EKG]: ICD-10-CM

## 2019-10-07 DIAGNOSIS — Z79.01 LONG TERM (CURRENT) USE OF ANTICOAGULANTS: ICD-10-CM

## 2019-10-07 DIAGNOSIS — I25.10 ATHEROSCLEROTIC HEART DISEASE OF NATIVE CORONARY ARTERY WITHOUT ANGINA PECTORIS: ICD-10-CM

## 2019-10-07 DIAGNOSIS — Z95.2 PRESENCE OF PROSTHETIC HEART VALVE: Chronic | ICD-10-CM

## 2019-10-07 DIAGNOSIS — Z95.5 PRESENCE OF CORONARY ANGIOPLASTY IMPLANT AND GRAFT: Chronic | ICD-10-CM

## 2019-10-07 PROBLEM — I10 ESSENTIAL (PRIMARY) HYPERTENSION: Chronic | Status: ACTIVE | Noted: 2019-10-02

## 2019-10-07 PROCEDURE — 99214 OFFICE O/P EST MOD 30 MIN: CPT

## 2019-10-07 PROCEDURE — 93000 ELECTROCARDIOGRAM COMPLETE: CPT

## 2019-10-11 ENCOUNTER — OUTPATIENT (OUTPATIENT)
Dept: OUTPATIENT SERVICES | Facility: HOSPITAL | Age: 64
LOS: 1 days | Discharge: HOME | End: 2019-10-11
Payer: MEDICARE

## 2019-10-11 DIAGNOSIS — I11.0 HYPERTENSIVE HEART DISEASE WITH HEART FAILURE: ICD-10-CM

## 2019-10-11 DIAGNOSIS — Z79.82 LONG TERM (CURRENT) USE OF ASPIRIN: ICD-10-CM

## 2019-10-11 DIAGNOSIS — Z95.2 PRESENCE OF PROSTHETIC HEART VALVE: Chronic | ICD-10-CM

## 2019-10-11 DIAGNOSIS — E11.9 TYPE 2 DIABETES MELLITUS WITHOUT COMPLICATIONS: ICD-10-CM

## 2019-10-11 DIAGNOSIS — Z95.5 PRESENCE OF CORONARY ANGIOPLASTY IMPLANT AND GRAFT: Chronic | ICD-10-CM

## 2019-10-11 DIAGNOSIS — Z88.2 ALLERGY STATUS TO SULFONAMIDES: ICD-10-CM

## 2019-10-11 DIAGNOSIS — J44.9 CHRONIC OBSTRUCTIVE PULMONARY DISEASE, UNSPECIFIED: ICD-10-CM

## 2019-10-11 DIAGNOSIS — Z79.84 LONG TERM (CURRENT) USE OF ORAL HYPOGLYCEMIC DRUGS: ICD-10-CM

## 2019-10-11 DIAGNOSIS — I25.5 ISCHEMIC CARDIOMYOPATHY: ICD-10-CM

## 2019-10-11 DIAGNOSIS — I20.0 UNSTABLE ANGINA: ICD-10-CM

## 2019-10-11 DIAGNOSIS — Z95.2 PRESENCE OF PROSTHETIC HEART VALVE: ICD-10-CM

## 2019-10-11 DIAGNOSIS — Z79.02 LONG TERM (CURRENT) USE OF ANTITHROMBOTICS/ANTIPLATELETS: ICD-10-CM

## 2019-10-11 DIAGNOSIS — I50.9 HEART FAILURE, UNSPECIFIED: ICD-10-CM

## 2019-10-11 DIAGNOSIS — I25.110 ATHEROSCLEROTIC HEART DISEASE OF NATIVE CORONARY ARTERY WITH UNSTABLE ANGINA PECTORIS: ICD-10-CM

## 2019-10-11 DIAGNOSIS — Z95.5 PRESENCE OF CORONARY ANGIOPLASTY IMPLANT AND GRAFT: ICD-10-CM

## 2019-10-11 LAB
ALBUMIN SERPL ELPH-MCNC: 4.5 G/DL — SIGNIFICANT CHANGE UP (ref 3.5–5.2)
ALP SERPL-CCNC: 42 U/L — SIGNIFICANT CHANGE UP (ref 30–115)
ALT FLD-CCNC: 26 U/L — SIGNIFICANT CHANGE UP (ref 0–41)
ANION GAP SERPL CALC-SCNC: 14 MMOL/L — SIGNIFICANT CHANGE UP (ref 7–14)
AST SERPL-CCNC: 18 U/L — SIGNIFICANT CHANGE UP (ref 0–41)
BILIRUB SERPL-MCNC: 0.3 MG/DL — SIGNIFICANT CHANGE UP (ref 0.2–1.2)
BUN SERPL-MCNC: 24 MG/DL — HIGH (ref 10–20)
CALCIUM SERPL-MCNC: 9.8 MG/DL — SIGNIFICANT CHANGE UP (ref 8.5–10.1)
CHLORIDE SERPL-SCNC: 106 MMOL/L — SIGNIFICANT CHANGE UP (ref 98–110)
CO2 SERPL-SCNC: 23 MMOL/L — SIGNIFICANT CHANGE UP (ref 17–32)
CREAT SERPL-MCNC: 1.4 MG/DL — SIGNIFICANT CHANGE UP (ref 0.7–1.5)
GLUCOSE BLDC GLUCOMTR-MCNC: 90 MG/DL — SIGNIFICANT CHANGE UP (ref 70–99)
GLUCOSE SERPL-MCNC: 105 MG/DL — HIGH (ref 70–99)
HCT VFR BLD CALC: 50.5 % — SIGNIFICANT CHANGE UP (ref 42–52)
HGB BLD-MCNC: 16.8 G/DL — SIGNIFICANT CHANGE UP (ref 14–18)
MCHC RBC-ENTMCNC: 28.8 PG — SIGNIFICANT CHANGE UP (ref 27–31)
MCHC RBC-ENTMCNC: 33.3 G/DL — SIGNIFICANT CHANGE UP (ref 32–37)
MCV RBC AUTO: 86.6 FL — SIGNIFICANT CHANGE UP (ref 80–94)
NRBC # BLD: 0 /100 WBCS — SIGNIFICANT CHANGE UP (ref 0–0)
PLATELET # BLD AUTO: 247 K/UL — SIGNIFICANT CHANGE UP (ref 130–400)
POTASSIUM SERPL-MCNC: 4.3 MMOL/L — SIGNIFICANT CHANGE UP (ref 3.5–5)
POTASSIUM SERPL-SCNC: 4.3 MMOL/L — SIGNIFICANT CHANGE UP (ref 3.5–5)
PROT SERPL-MCNC: 7 G/DL — SIGNIFICANT CHANGE UP (ref 6–8)
RBC # BLD: 5.83 M/UL — SIGNIFICANT CHANGE UP (ref 4.7–6.1)
RBC # FLD: 12.5 % — SIGNIFICANT CHANGE UP (ref 11.5–14.5)
SODIUM SERPL-SCNC: 143 MMOL/L — SIGNIFICANT CHANGE UP (ref 135–146)
WBC # BLD: 10.64 K/UL — SIGNIFICANT CHANGE UP (ref 4.8–10.8)
WBC # FLD AUTO: 10.64 K/UL — SIGNIFICANT CHANGE UP (ref 4.8–10.8)

## 2019-10-11 PROCEDURE — 93010 ELECTROCARDIOGRAM REPORT: CPT

## 2019-10-11 RX ORDER — ASPIRIN/CALCIUM CARB/MAGNESIUM 324 MG
1 TABLET ORAL
Qty: 0 | Refills: 0 | DISCHARGE

## 2019-10-11 RX ORDER — METFORMIN HYDROCHLORIDE 850 MG/1
1 TABLET ORAL
Qty: 0 | Refills: 0 | DISCHARGE

## 2019-10-11 NOTE — ASU PATIENT PROFILE, ADULT - PREOP PAIN SCORE
ambulatory complaining of allergic rxn. pt speaking in complete sentences. reports mild throat discomfort. 0

## 2019-10-11 NOTE — PROGRESS NOTE ADULT - SUBJECTIVE AND OBJECTIVE BOX
Cardiology Follow up    MONIQUE LYONS   64y Male  PAST MEDICAL & SURGICAL HISTORY:  Stented coronary artery  HTN (hypertension)  Chronic obstructive pulmonary disease (COPD)  CAD (coronary artery disease)  Diabetes  CHF (congestive heart failure)  Heart valve replaced  H/O heart artery stent       HPI:  64 yrs old with Hx of COPD , CAD s/p PCI presented for elective LHC.  pt had PCI to LAD 10 days ago. is here today for staged PCI to RCA.  he is on asa/plavix . (11 Oct 2019 07:10)    Allergies    sulfa drugs (Unknown)    Intolerances      Patient without complaints. Pt ambulated without issues/symptoms  Denies CP, SOB, palpitations, or dizziness  No events on telemetry overnight    Vital Signs Last 24 Hrs  T(C): --  T(F): --  HR: --  BP: --  BP(mean): --  RR: --  SpO2: --    MEDICATIONS  (STANDING):    MEDICATIONS  (PRN):      REVIEW OF SYSTEMS:          CONSTITUTIONAL: No weakness, fevers or chills          EYES/ENT: No visual changes;  No vertigo or throat pain           NECK: No pain or stiffness          RESPIRATORY: No cough, wheezing, hemoptysis          CARDIOVASCULAR: no pain, no LESLIE, no palpitations           GASTROINTESTINAL: No abdominal or epigastric pain. No nausea, vomiting, or hematemesis;           GENITOURINARY: No dysuria, frequency or hematuria          NEUROLOGICAL: No numbness or weakness          SKIN: No itching, rashes    PHYSICAL EXAM:           CONSTITUTIONAL: Well-developed; well-nourished; in no acute distress  	SKIN: warm, dry  	HEAD: Normocephalic; atraumatic  	EYES: PERRL.  	ENT: No nasal discharge, airway clear, mucous membranes moist  	NECK: Supple; non tender.  	CARD: +S1, +S2, no murmurs, gallops, or rubs. (Regular) rate and rhythm    	RESP: No wheezes, rales or rhonchi. CTA B/L  	ABD: soft ntnd, + BS x 4 quadrants  	EXT: moves all extremities,  no clubbing, cyanosis or edema  	NEURO: Alert and oriented x3, no focal deficits          PSYCH: Cooperative, appropriate          VASCULAR:  + Rad / + PTs / + DPs          EXTREMITY:  	   Right Radial: D-Stat bracelet in place, + pulses,  access site soft, no hematoma, no pain, no numbness, no signs and symptoms of infection             ECG: P @ [id number]                                                                                                                  LABS:                        16.8   10.64 )-----------( 247      ( 11 Oct 2019 07:27 )             50.5     10-11    143  |  106  |  24<H>  ----------------------------<  105<H>  4.3   |  23  |  1.4    Ca    9.8      11 Oct 2019 07:27    TPro  7.0  /  Alb  4.5  /  TBili  0.3  /  DBili  x   /  AST  18  /  ALT  26  /  AlkPhos  42  10-11        LIVER FUNCTIONS - ( 11 Oct 2019 07:27 )  Alb: 4.5 g/dL / Pro: 7.0 g/dL / ALK PHOS: 42 U/L / ALT: 26 U/L / AST: 18 U/L / GGT: x             A/P:  I discussed the case with Cardiologist Dr. Rosado and recommend the following:    S/P staged PCI RCA MANN x 2   	     Continue DAPT (asa 81mg daily, plavix 75mg daily),  B-Blocker, Statin Therapy with prior meds                   Pt instructed to hold metformin x 48 hrs post cath                   monitor access site                    ekg at 1400 prior to d/c                    pt states has supply of Plavix at home                    Patient given 30 day supply of ( Aspirin 81 mg daily and Plavix 75 mg daily ) to take at home                   Patient agreeing to take DAPT for at least one year or as directed by cardiologist                    Pt given instructions on importance of taking antiplatelet medication or risk acute stent thrombosis/death                   Post cath instructions, access site care and activity restrictions reviewed with patient                     Discussed with patient to return to hospital if experience chest pain, shortness breath, dizziness and site bleeding                   Aggressive risk factor modification, diet counseling, smoking cessation discussed with patient                       Can discharge patient from cardiac standpoint at 1530 if ekg/site wnl and ambulating without symptoms                    Follow up with Cardiology Dr. Lamar in one week.  Instructed to call and make an appointment

## 2019-10-11 NOTE — H&P CARDIOLOGY - HISTORY OF PRESENT ILLNESS
64 yrs old with Hx of COPD , CAD s/p PCI presented for elective LHC.  pt had PCI to LAD 10 days ago. is here today for staged PCI to RCA.  he is on asa/plavix .

## 2019-10-11 NOTE — CHART NOTE - NSCHARTNOTEFT_GEN_A_CORE
PRE-OP DIAGNOSIS: staged RCA PCI    PROCEDURE: [facility] with coronary angiography    Physician: Alonzo  Assistant: Crys    ANESTHESIA TYPE:  [  ]General Anesthesia  [ x ] Sedation  [x  ] Local/Regional    ESTIMATED BLOOD LOSS:    10   mL    CONDITION  [  ] Critical  [  ] Serious  [  ]Fair  [ x ]Good      SPECIMENS REMOVED (IF APPLICABLE): N/A      IV CONTRAST:         80    mL      IMPLANTS (IF APPLICABLE)      FINDINGS    Left Heart Catheterization:  LVEF%:  LVEDP:  [ ] Normal Coronary Arteries  [ ] Luminal Irregularities  [ ] Non-obstructive CAD      LEFT HEART CATHETERIZATION                                    Left main: normal    LAD:    mid patent stent                      Diag: patent    Left Circumflex: mild diffuse disease  OM:    Right Coronary Artery: mid 90% lesion. 2 MANN placed, distal vessel mild diffuse disease.   RPDA  RPL    Ramus Intermed:    DOMINANCE: Right    ACCESS: r radial  CLOSURE: d stat    INTERVENTION  IMPLANTS: MANN x2 in pRCA      POST-OP DIAGNOSIS:           PLAN OF CARE  [x ] D/C Home today  [ ]  D/C in AM  [ ] Return to In-patient bed  [ ] Admit for observation  [ ] Return for staged procedure:  [ ] CT Surgery consult called  [ x]  Continue DAPT, B-blocker & Statin therapy

## 2019-11-04 ENCOUNTER — APPOINTMENT (OUTPATIENT)
Dept: CARDIOLOGY | Facility: CLINIC | Age: 64
End: 2019-11-04
Payer: MEDICARE

## 2019-11-04 PROCEDURE — 93000 ELECTROCARDIOGRAM COMPLETE: CPT

## 2019-12-02 ENCOUNTER — INPATIENT (INPATIENT)
Facility: HOSPITAL | Age: 64
LOS: 2 days | Discharge: LEFT BEFORE DISCHARGE | End: 2019-12-05
Attending: INTERNAL MEDICINE | Admitting: INTERNAL MEDICINE
Payer: MEDICARE

## 2019-12-02 VITALS
WEIGHT: 195.11 LBS | OXYGEN SATURATION: 98 % | SYSTOLIC BLOOD PRESSURE: 152 MMHG | RESPIRATION RATE: 18 BRPM | TEMPERATURE: 97 F | HEART RATE: 95 BPM | DIASTOLIC BLOOD PRESSURE: 74 MMHG

## 2019-12-02 DIAGNOSIS — Z95.5 PRESENCE OF CORONARY ANGIOPLASTY IMPLANT AND GRAFT: Chronic | ICD-10-CM

## 2019-12-02 DIAGNOSIS — Z95.2 PRESENCE OF PROSTHETIC HEART VALVE: Chronic | ICD-10-CM

## 2019-12-02 LAB
ALBUMIN SERPL ELPH-MCNC: 4.3 G/DL — SIGNIFICANT CHANGE UP (ref 3.5–5.2)
ALP SERPL-CCNC: 42 U/L — SIGNIFICANT CHANGE UP (ref 30–115)
ALT FLD-CCNC: 46 U/L — HIGH (ref 0–41)
ANION GAP SERPL CALC-SCNC: 17 MMOL/L — HIGH (ref 7–14)
APTT BLD: 32.6 SEC — SIGNIFICANT CHANGE UP (ref 27–39.2)
APTT BLD: 48.1 SEC — HIGH (ref 27–39.2)
AST SERPL-CCNC: 31 U/L — SIGNIFICANT CHANGE UP (ref 0–41)
BASOPHILS # BLD AUTO: 0.1 K/UL — SIGNIFICANT CHANGE UP (ref 0–0.2)
BASOPHILS NFR BLD AUTO: 0.8 % — SIGNIFICANT CHANGE UP (ref 0–1)
BILIRUB SERPL-MCNC: 0.4 MG/DL — SIGNIFICANT CHANGE UP (ref 0.2–1.2)
BUN SERPL-MCNC: 21 MG/DL — HIGH (ref 10–20)
CALCIUM SERPL-MCNC: 9.5 MG/DL — SIGNIFICANT CHANGE UP (ref 8.5–10.1)
CHLORIDE SERPL-SCNC: 99 MMOL/L — SIGNIFICANT CHANGE UP (ref 98–110)
CK MB CFR SERPL CALC: 81.4 NG/ML — HIGH (ref 0.6–6.3)
CK SERPL-CCNC: 596 U/L — HIGH (ref 0–225)
CO2 SERPL-SCNC: 21 MMOL/L — SIGNIFICANT CHANGE UP (ref 17–32)
CREAT SERPL-MCNC: 1.4 MG/DL — SIGNIFICANT CHANGE UP (ref 0.7–1.5)
EOSINOPHIL # BLD AUTO: 0.29 K/UL — SIGNIFICANT CHANGE UP (ref 0–0.7)
EOSINOPHIL NFR BLD AUTO: 2.4 % — SIGNIFICANT CHANGE UP (ref 0–8)
GLUCOSE SERPL-MCNC: 351 MG/DL — HIGH (ref 70–99)
HCT VFR BLD CALC: 50.2 % — SIGNIFICANT CHANGE UP (ref 42–52)
HGB BLD-MCNC: 16.9 G/DL — SIGNIFICANT CHANGE UP (ref 14–18)
IMM GRANULOCYTES NFR BLD AUTO: 0.4 % — HIGH (ref 0.1–0.3)
INR BLD: 1.01 RATIO — SIGNIFICANT CHANGE UP (ref 0.65–1.3)
LYMPHOCYTES # BLD AUTO: 17.3 % — LOW (ref 20.5–51.1)
LYMPHOCYTES # BLD AUTO: 2.07 K/UL — SIGNIFICANT CHANGE UP (ref 1.2–3.4)
MCHC RBC-ENTMCNC: 29.3 PG — SIGNIFICANT CHANGE UP (ref 27–31)
MCHC RBC-ENTMCNC: 33.7 G/DL — SIGNIFICANT CHANGE UP (ref 32–37)
MCV RBC AUTO: 87 FL — SIGNIFICANT CHANGE UP (ref 80–94)
MONOCYTES # BLD AUTO: 0.85 K/UL — HIGH (ref 0.1–0.6)
MONOCYTES NFR BLD AUTO: 7.1 % — SIGNIFICANT CHANGE UP (ref 1.7–9.3)
NEUTROPHILS # BLD AUTO: 8.63 K/UL — HIGH (ref 1.4–6.5)
NEUTROPHILS NFR BLD AUTO: 72 % — SIGNIFICANT CHANGE UP (ref 42.2–75.2)
NRBC # BLD: 0 /100 WBCS — SIGNIFICANT CHANGE UP (ref 0–0)
NT-PROBNP SERPL-SCNC: 568 PG/ML — HIGH (ref 0–300)
PLATELET # BLD AUTO: 220 K/UL — SIGNIFICANT CHANGE UP (ref 130–400)
POTASSIUM SERPL-MCNC: 4 MMOL/L — SIGNIFICANT CHANGE UP (ref 3.5–5)
POTASSIUM SERPL-SCNC: 4 MMOL/L — SIGNIFICANT CHANGE UP (ref 3.5–5)
PROT SERPL-MCNC: 6.8 G/DL — SIGNIFICANT CHANGE UP (ref 6–8)
PROTHROM AB SERPL-ACNC: 11.6 SEC — SIGNIFICANT CHANGE UP (ref 9.95–12.87)
RBC # BLD: 5.77 M/UL — SIGNIFICANT CHANGE UP (ref 4.7–6.1)
RBC # FLD: 12.9 % — SIGNIFICANT CHANGE UP (ref 11.5–14.5)
SODIUM SERPL-SCNC: 137 MMOL/L — SIGNIFICANT CHANGE UP (ref 135–146)
TROPONIN T SERPL-MCNC: 0.1 NG/ML — CRITICAL HIGH
TROPONIN T SERPL-MCNC: 0.47 NG/ML — CRITICAL HIGH
TROPONIN T SERPL-MCNC: 0.61 NG/ML — CRITICAL HIGH
WBC # BLD: 11.99 K/UL — HIGH (ref 4.8–10.8)
WBC # FLD AUTO: 11.99 K/UL — HIGH (ref 4.8–10.8)

## 2019-12-02 PROCEDURE — 71275 CT ANGIOGRAPHY CHEST: CPT | Mod: 26

## 2019-12-02 PROCEDURE — 71045 X-RAY EXAM CHEST 1 VIEW: CPT | Mod: 26

## 2019-12-02 PROCEDURE — 93010 ELECTROCARDIOGRAM REPORT: CPT | Mod: 76

## 2019-12-02 PROCEDURE — 99291 CRITICAL CARE FIRST HOUR: CPT

## 2019-12-02 RX ORDER — SODIUM CHLORIDE 9 MG/ML
1000 INJECTION INTRAMUSCULAR; INTRAVENOUS; SUBCUTANEOUS
Refills: 0 | Status: DISCONTINUED | OUTPATIENT
Start: 2019-12-02 | End: 2019-12-02

## 2019-12-02 RX ORDER — HEPARIN SODIUM 5000 [USP'U]/ML
7000 INJECTION INTRAVENOUS; SUBCUTANEOUS EVERY 6 HOURS
Refills: 0 | Status: DISCONTINUED | OUTPATIENT
Start: 2019-12-02 | End: 2019-12-02

## 2019-12-02 RX ORDER — GLYCOPYRROLATE AND FORMOTEROL FUMARATE 9; 4.8 UG/1; UG/1
2 AEROSOL, METERED RESPIRATORY (INHALATION)
Refills: 0 | Status: DISCONTINUED | OUTPATIENT
Start: 2019-12-02 | End: 2019-12-02

## 2019-12-02 RX ORDER — METOPROLOL TARTRATE 50 MG
2.5 TABLET ORAL ONCE
Refills: 0 | Status: COMPLETED | OUTPATIENT
Start: 2019-12-02 | End: 2019-12-02

## 2019-12-02 RX ORDER — AMIODARONE HYDROCHLORIDE 400 MG/1
1 TABLET ORAL
Qty: 900 | Refills: 0 | Status: DISCONTINUED | OUTPATIENT
Start: 2019-12-02 | End: 2019-12-03

## 2019-12-02 RX ORDER — HEPARIN SODIUM 5000 [USP'U]/ML
INJECTION INTRAVENOUS; SUBCUTANEOUS
Qty: 25000 | Refills: 0 | Status: DISCONTINUED | OUTPATIENT
Start: 2019-12-02 | End: 2019-12-02

## 2019-12-02 RX ORDER — ATORVASTATIN CALCIUM 80 MG/1
40 TABLET, FILM COATED ORAL AT BEDTIME
Refills: 0 | Status: DISCONTINUED | OUTPATIENT
Start: 2019-12-02 | End: 2019-12-05

## 2019-12-02 RX ORDER — ASPIRIN/CALCIUM CARB/MAGNESIUM 324 MG
81 TABLET ORAL DAILY
Refills: 0 | Status: DISCONTINUED | OUTPATIENT
Start: 2019-12-02 | End: 2019-12-05

## 2019-12-02 RX ORDER — BUDESONIDE AND FORMOTEROL FUMARATE DIHYDRATE 160; 4.5 UG/1; UG/1
2 AEROSOL RESPIRATORY (INHALATION)
Refills: 0 | Status: DISCONTINUED | OUTPATIENT
Start: 2019-12-02 | End: 2019-12-05

## 2019-12-02 RX ORDER — CLOPIDOGREL BISULFATE 75 MG/1
75 TABLET, FILM COATED ORAL DAILY
Refills: 0 | Status: DISCONTINUED | OUTPATIENT
Start: 2019-12-02 | End: 2019-12-05

## 2019-12-02 RX ORDER — HEPARIN SODIUM 5000 [USP'U]/ML
7000 INJECTION INTRAVENOUS; SUBCUTANEOUS ONCE
Refills: 0 | Status: DISCONTINUED | OUTPATIENT
Start: 2019-12-02 | End: 2019-12-02

## 2019-12-02 RX ORDER — AMIODARONE HYDROCHLORIDE 400 MG/1
150 TABLET ORAL ONCE
Refills: 0 | Status: COMPLETED | OUTPATIENT
Start: 2019-12-02 | End: 2019-12-02

## 2019-12-02 RX ORDER — DILTIAZEM HCL 120 MG
5 CAPSULE, EXT RELEASE 24 HR ORAL ONCE
Refills: 0 | Status: COMPLETED | OUTPATIENT
Start: 2019-12-02 | End: 2019-12-02

## 2019-12-02 RX ORDER — FENOFIBRATE,MICRONIZED 130 MG
145 CAPSULE ORAL DAILY
Refills: 0 | Status: DISCONTINUED | OUTPATIENT
Start: 2019-12-02 | End: 2019-12-05

## 2019-12-02 RX ORDER — ZOLPIDEM TARTRATE 10 MG/1
5 TABLET ORAL AT BEDTIME
Refills: 0 | Status: DISCONTINUED | OUTPATIENT
Start: 2019-12-02 | End: 2019-12-05

## 2019-12-02 RX ORDER — HEPARIN SODIUM 5000 [USP'U]/ML
3500 INJECTION INTRAVENOUS; SUBCUTANEOUS EVERY 6 HOURS
Refills: 0 | Status: DISCONTINUED | OUTPATIENT
Start: 2019-12-02 | End: 2019-12-02

## 2019-12-02 RX ORDER — DILTIAZEM HCL 120 MG
5 CAPSULE, EXT RELEASE 24 HR ORAL
Qty: 125 | Refills: 0 | Status: DISCONTINUED | OUTPATIENT
Start: 2019-12-02 | End: 2019-12-02

## 2019-12-02 RX ORDER — SODIUM CHLORIDE 9 MG/ML
500 INJECTION INTRAMUSCULAR; INTRAVENOUS; SUBCUTANEOUS ONCE
Refills: 0 | Status: COMPLETED | OUTPATIENT
Start: 2019-12-02 | End: 2019-12-02

## 2019-12-02 RX ORDER — HEPARIN SODIUM 5000 [USP'U]/ML
1850 INJECTION INTRAVENOUS; SUBCUTANEOUS
Qty: 25000 | Refills: 0 | Status: DISCONTINUED | OUTPATIENT
Start: 2019-12-02 | End: 2019-12-02

## 2019-12-02 RX ORDER — HEPARIN SODIUM 5000 [USP'U]/ML
2100 INJECTION INTRAVENOUS; SUBCUTANEOUS
Qty: 25000 | Refills: 0 | Status: DISCONTINUED | OUTPATIENT
Start: 2019-12-02 | End: 2019-12-03

## 2019-12-02 RX ADMIN — AMIODARONE HYDROCHLORIDE 618 MILLIGRAM(S): 400 TABLET ORAL at 07:21

## 2019-12-02 RX ADMIN — SODIUM CHLORIDE 1500 MILLILITER(S): 9 INJECTION INTRAMUSCULAR; INTRAVENOUS; SUBCUTANEOUS at 10:34

## 2019-12-02 RX ADMIN — ATORVASTATIN CALCIUM 40 MILLIGRAM(S): 80 TABLET, FILM COATED ORAL at 22:09

## 2019-12-02 RX ADMIN — Medication 5 MILLIGRAM(S): at 05:30

## 2019-12-02 RX ADMIN — SODIUM CHLORIDE 500 MILLILITER(S): 9 INJECTION INTRAMUSCULAR; INTRAVENOUS; SUBCUTANEOUS at 09:38

## 2019-12-02 RX ADMIN — Medication 5 MILLIGRAM(S): at 05:45

## 2019-12-02 RX ADMIN — HEPARIN SODIUM 21 UNIT(S)/HR: 5000 INJECTION INTRAVENOUS; SUBCUTANEOUS at 22:10

## 2019-12-02 RX ADMIN — Medication 145 MILLIGRAM(S): at 14:21

## 2019-12-02 RX ADMIN — AMIODARONE HYDROCHLORIDE 33.33 MG/MIN: 400 TABLET ORAL at 22:10

## 2019-12-02 RX ADMIN — Medication 5 MG/HR: at 06:38

## 2019-12-02 RX ADMIN — Medication 2.5 MILLIGRAM(S): at 06:30

## 2019-12-02 RX ADMIN — CLOPIDOGREL BISULFATE 75 MILLIGRAM(S): 75 TABLET, FILM COATED ORAL at 14:21

## 2019-12-02 RX ADMIN — HEPARIN SODIUM 18.5 UNIT(S)/HR: 5000 INJECTION INTRAVENOUS; SUBCUTANEOUS at 09:25

## 2019-12-02 RX ADMIN — HEPARIN SODIUM 21 UNIT(S)/HR: 5000 INJECTION INTRAVENOUS; SUBCUTANEOUS at 16:20

## 2019-12-02 RX ADMIN — AMIODARONE HYDROCHLORIDE 33.33 MG/MIN: 400 TABLET ORAL at 08:10

## 2019-12-02 RX ADMIN — Medication 81 MILLIGRAM(S): at 14:21

## 2019-12-02 RX ADMIN — BUDESONIDE AND FORMOTEROL FUMARATE DIHYDRATE 2 PUFF(S): 160; 4.5 AEROSOL RESPIRATORY (INHALATION) at 22:09

## 2019-12-02 NOTE — ED PROVIDER NOTE - ATTENDING CONTRIBUTION TO CARE
63 y/o male with h/o CAD s/p 13 stents, CHF, COPD, DM, HTN, in ER with c/o CP which started last night at rest. Pt took SLNG at home but didn't help much and so came to ER.  Pt denies any SOB.  no palpitations. No n/v.  no abd pain.  no le pain/swelling.  no ha/dizziness/loc.   Pt follows with [facility] for cardiology, most recently had cardiac cath x 2 10/2019 with LAD and RCA stents placed. Pt states pain now improved.  PE - nad, nc/at, eomi, perrl, op - clear, cta b/l, no w/r/r, tachy, irreg rhythm, abd - soft, nt/nd, nabs, from x 4, A&O x 3, no focal neuro deficits.

## 2019-12-02 NOTE — CONSULT NOTE ADULT - ASSESSMENT
Patient is a 64y old  Male who presents with a chief complaint of chest pain     Stented coronary artery  HTN (hypertension)  Chronic obstructive pulmonary disease (COPD)  CAD (coronary artery disease) s/p PCI  Diabetes  Ischemic CMP  New-onset a-fib with RVR    Recommendations:   IV metoprolol 2.5 mg x1   Change cardizem to amiodarone drip  Keep NPO for possible KUSH/DCCV  Heparin drip   continue DAPT, metoprolol, lasix and ACEi Patient is a 64y old  Male who presents with a chief complaint of chest pain     Stented coronary artery  HTN (hypertension)  Chronic obstructive pulmonary disease (COPD)  CAD (coronary artery disease) s/p PCI  Diabetes  Ischemic CMP  New-onset a-fib with RVR    Recommendations:   IV metoprolol 2.5 mg x1   Change cardizem to amiodarone drip  Keep NPO for possible KUSH/DCCV  Heparin drip   continue DAPT, metoprolol, lasix and ACEI  EP evaluation

## 2019-12-02 NOTE — CONSULT NOTE ADULT - ASSESSMENT
Assessment: 63 yo M with CAD sp PCI, AVR, ICM 30% admitted with chest pain and found to be in new onset AFib with RVR in 150s. Pt feeling better now, denies palpitations, dizziness or syncope.     Plan:  AFib RVR  Chest Pain  - Give another bolus of Amiodarone and cont Amio drip  - CTA to r/o dissection  - 2D Echo  - No KUSH/DCCV needed at this time  - Cont tele monitoring  - Monitor BP and maintain SBP > 90  - Monitor electrolytes and maintain WNL  - Will cont to follow Assessment: 63 yo M with CAD sp PCI, AVR, ICM 30% admitted with chest pain and found to be in new onset AFib with RVR in 150s. Pt feeling better now, denies palpitations, dizziness or syncope.     Plan:    chest pain  rule out Aortic dissection  vs aacute coronary syndrome vs GI cause  AFib RVRhypotension probably due to IV cardiazem  recommend  - Give another bolus of Amiodarone and cont Amio drip  - CTA to r/o dissection  - 2D Echo-   - Cont tele monitoring ,cardiac enzymes  - Monitor BP and maintain SBP > 90  - Monitor electrolytes and maintain WNL  - Will cont to follow

## 2019-12-02 NOTE — ED ADULT NURSE NOTE - NSIMPLEMENTINTERV_GEN_ALL_ED
Implemented All Universal Safety Interventions:  [place] to call system. Call bell, personal items and telephone within reach. Instruct patient to call for assistance. Room bathroom lighting operational. Non-slip footwear when patient is off stretcher. Physically safe environment: no spills, clutter or unnecessary equipment. Stretcher in lowest position, wheels locked, appropriate side rails in place.

## 2019-12-02 NOTE — CONSULT NOTE ADULT - SUBJECTIVE AND OBJECTIVE BOX
Date of Admission: 12/2/2019    CHIEF COMPLAINT: Chest pain    HISTORY OF PRESENT ILLNESS:   64 male with CAD s/p PCI (Oct 2019), ischemic cmp (EF 30%), DM, AVR, COPD presenting for chest pain since 1 am this morning. Took 2 SL nitro that barely helped the pain so he presented to the ED. Here he was found to be in a-fib with RVR (new-onset), started by EDM on cardizem and his SBP dropped to .    PAST MEDICAL & SURGICAL HISTORY:  Stented coronary artery  HTN (hypertension)  Chronic obstructive pulmonary disease (COPD)  CAD (coronary artery disease)  Diabetes  CHF (congestive heart failure)  Heart valve replaced  H/O heart artery stent      FAMILY HISTORY:  [x] no pertinent family history of premature cardiovascular disease in first degree relatives.  Mother:   Father:   Siblings:     SOCIAL HISTORY:    [ ] Non-smoker  [x] Smoker  [ ] Alcohol    Allergies    sulfa drugs (Unknown)    Intolerances    	    REVIEW OF SYSTEMS:  CONSTITUTIONAL: denies fever, weight loss, or fatigue  CARDIOLOGY: see HPI.   RESPIRATORY: denies shortness of breath, wheezing.   NEUROLOGICAL: denies weakness, no focal deficits to report.  ENDOCRINOLOGICAL: no recent change in diabetic medications.   GI: no BRBPR, no N,V, diarrhea.    PSYCHIATRY: normal mood and affect  HEENT: no nasal discharge, no ecchymosis  SKIN: no ecchymosis, no breakdown  MUSCULOSKELETAL: Full range of motion x4.     PHYSICAL EXAM:  T(C): 36.2 (12-02-19 @ 04:43), Max: 36.2 (12-02-19 @ 04:43)  HR: 95 (12-02-19 @ 04:43) (95 - 95)  BP: 152/74 (12-02-19 @ 04:43) (152/74 - 152/74)  RR: 18 (12-02-19 @ 04:43) (18 - 18)  SpO2: 98% (12-02-19 @ 04:43) (98% - 98%)  Wt(kg): --  I&O's Summary      General Appearance: well appearing, normal for age and gender. 	  Neck: normal JVP, no bruit.   Eyes: No xanthomalasia, Extra Ocular muscles intact.   Cardiovascular: irregularly irregular S1 S2, No JVD, No murmurs, No edema  Respiratory: Lungs clear to auscultation	  Psychiatry: Alert and oriented x 3, Mood & affect appropriate  Gastrointestinal:  Soft, Non-tender  Skin/Integumen: No rashes, No ecchymoses, No cyanosis	  Neurologic: Non-focal  Musculoskeletal/extremities: Normal range of motion, No clubbing, cyanosis or edema  Vascular: Peripheral pulses palpable 2+ bilaterally    LABS:	 	                          16.9   11.99 )-----------( 220      ( 02 Dec 2019 05:00 )             50.2     12-02    137  |  99  |  21<H>  ----------------------------<  351<H>  4.0   |  21  |  1.4    Ca    9.5      02 Dec 2019 05:00    TPro  6.8  /  Alb  4.3  /  TBili  0.4  /  DBili  x   /  AST  31  /  ALT  46<H>  /  AlkPhos  42  12-02    CARDIAC MARKERS ( 02 Dec 2019 05:00 )  x     / 0.10 ng/mL / x     / x     / x              TELEMETRY EVENTS: 	  A-fib 110-150  ECG:  A-fib, LBBB, lateral ischemia	  RADIOLOGY:  OTHER: 	    PREVIOUS DIAGNOSTIC TESTING:    [ ] Echocardiogram:  < from: Transthoracic Echocardiogram (09.12.19 @ 11:03) >    Summary:   1. Severely decreased segmental left ventricular systolic function.   2. LV Ejection Fraction by Beck's Method with a biplane EF of 30 %.   3. Increased LV wall thickness.   4.Spectral Doppler shows impaired relaxation pattern of left   ventricular myocardial filling (Grade I diastolic dysfunction).   5. Mild mitral valve regurgitation.   6. Thickening and calcification of the anterior and posterior mitral   valve leaflets.   7. Bioprosthesis in the aortic position.   8. Peak transaortic gradient equals 42.8 mmHg, mean transaortic gradient   equals 24.8 mmHg.    < end of copied text >    [ ] Catheterization: OCT 2019  LEFT HEART CATHETERIZATION                                    Left main: normal    LAD:    mid patent stent                      Diag: patent    Left Circumflex: mild diffuse disease  OM:    Right Coronary Artery: mid 90% lesion. 2 MANN placed, distal vessel mild diffuse disease.   RPDA  RPL      Home Medications:  atorvastatin 40 mg oral tablet: 1 tab(s) orally once a day (at bedtime) (11 Oct 2019 06:49)  Bevespi Aerosphere 9 mcg-4.8 mcg/inh inhalation aerosol: 2 puff(s) inhaled 2 times a day (11 Oct 2019 06:49)  clopidogrel 75 mg oral tablet: 1 tab(s) orally once a day (11 Oct 2019 06:49)  ESZOPICLONE 3 MG TABLET: 1 tab(s) orally once a day (11 Oct 2019 06:49)  FENOFIBRATE 160 MG TABLET: 1 tab(s) orally once a day (11 Oct 2019 06:49)  furosemide 40 mg oral tablet: 1 tab(s) orally once a day (11 Oct 2019 06:49)  irbesartan 300 mg oral tablet: 1 tab(s) orally once a day (11 Oct 2019 06:49)  metFORMIN 750 mg oral tablet, extended release: HOLD X 48 HRS POST CATH, 1 tab(s) orally once a day (11 Oct 2019 10:31)  metoprolol succinate 200 mg oral capsule, extended release: 1 cap(s) orally once a day (11 Oct 2019 06:49)  NovoLOG 100 units/mL injectable solution: 8 unit(s) injectable once a day (11 Oct 2019 06:49)  OMEGA-3 ETHYL ESTERS 1 GM CAP: 1 cap(s) orally once a day (11 Oct 2019 06:49)  Tresiba 100 units/mL subcutaneous solution: 120 unit(s) subcutaneous once a day (11 Oct 2019 06:49)  TRESIBA FLEXTOUCH 200 UNITS/ML:  (11 Oct 2019 06:49)  TRULICITY 1.5 MG/0.5 ML PEN:  (11 Oct 2019 06:49)    MEDICATIONS  (STANDING):    MEDICATIONS  (PRN):

## 2019-12-02 NOTE — H&P ADULT - NSICDXPASTMEDICALHX_GEN_ALL_CORE_FT
PAST MEDICAL HISTORY:  CAD (coronary artery disease)     CHF (congestive heart failure)     Chronic obstructive pulmonary disease (COPD)     Diabetes     Dyslipidemia     HTN (hypertension)     Stented coronary artery

## 2019-12-02 NOTE — CONSULT NOTE ADULT - SUBJECTIVE AND OBJECTIVE BOX
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 On his right lower extremity, he has no pain, numbness, tingling, motor, or sensory deficit. Patient presented to the ED with sudden onset chest pain radiating to the back. The ED obtained a CTA chest to rule out aortic dissection. There was no dissection in the aorta but there was an incidentally noted right common iliac artery dissection. Patient was found to have new onset a fib and is being evaluated for possible cardioversion.   PAST MEDICAL & SURGICAL HISTORY:  Dyslipidemia  Stented coronary artery  HTN (hypertension)  Chronic obstructive pulmonary disease (COPD)  CAD (coronary artery disease)  Diabetes  CHF (congestive heart failure)  Heart valve replaced  H/O heart artery stent        MEDICATIONS  (STANDING):  aMIOdarone Infusion 1 mG/Min (33.333 mL/Hr) IV Continuous <Continuous>  aspirin enteric coated 81 milliGRAM(s) Oral daily  atorvastatin 40 milliGRAM(s) Oral at bedtime  budesonide 160 MICROgram(s)/formoterol 4.5 MICROgram(s) Inhaler 2 Puff(s) Inhalation two times a day  clopidogrel Tablet 75 milliGRAM(s) Oral daily  fenofibrate Tablet 145 milliGRAM(s) Oral daily  heparin  Infusion 1850 Unit(s)/Hr (18.5 mL/Hr) IV Continuous <Continuous>  sodium chloride 0.9% Bolus 500 milliLiter(s) IV Bolus once  zolpidem 5 milliGRAM(s) Oral at bedtime    MEDICATIONS  (PRN):      Allergies    sulfa drugs (Unknown)    Intolerances        REVIEW OF SYSTEMS    [x] A ten-point review of systems was otherwise negative except as noted.  [ ] Due to altered mental status/intubation, subjective information were not able to be obtained from the patient. History was obtained, to the extent possible, from review of the chart and collateral sources of information.      Vital Signs Last 24 Hrs  T(C): 36.2 (02 Dec 2019 04:43), Max: 36.2 (02 Dec 2019 04:43)  T(F): 97.2 (02 Dec 2019 04:43), Max: 97.2 (02 Dec 2019 04:43)  HR: 111 (02 Dec 2019 12:01) (95 - 190)  BP: 122/89 (02 Dec 2019 12:01) (94/43 - 152/74)  BP(mean): --  RR: 19 (02 Dec 2019 12:01) (18 - 19)  SpO2: 96% (02 Dec 2019 12:01) (96% - 98%)    PHYSICAL EXAM:  GENERAL: NAD, well-appearing  CHEST/LUNG: Clear to auscultation bilaterally  HEART: irregularly irregular rhythm   ABDOMEN: Soft, Nontender, Nondistended;   EXTREMITIES:  No clubbing, cyanosis, or edema  PULSES: Feet warm and well perfused, DP and PT pulses palpable b/l       LABS:  Labs:  CAPILLARY BLOOD GLUCOSE                              16.9   11.99 )-----------( 220      ( 02 Dec 2019 05:00 )             50.2       Auto Neutrophil %: 72.0 % (12-02-19 @ 05:00)  Auto Immature Granulocyte %: 0.4 % (12-02-19 @ 05:00)    12-02    137  |  99  |  21<H>  ----------------------------<  351<H>  4.0   |  21  |  1.4      Calcium, Total Serum: 9.5 mg/dL (12-02-19 @ 05:00)      LFTs:             6.8  | 0.4  | 31       ------------------[42      ( 02 Dec 2019 05:00 )  4.3  | x    | 46          Lipase:x      Amylase:x             Coags:     x      ----< x       ( 02 Dec 2019 11:43 )     48.1        CARDIAC MARKERS ( 02 Dec 2019 11:43 )  x     / 0.47 ng/mL / 596 U/L / x     / 81.4 ng/mL  CARDIAC MARKERS ( 02 Dec 2019 05:00 )  x     / 0.10 ng/mL / x     / x     / x          Serum Pro-Brain Natriuretic Peptide: 568 pg/mL (12-02-19 @ 05:00)        RADIOLOGY & ADDITIONAL STUDIES:  < from: CT Angio Chest Dissection Protocol (12.02.19 @ 10:34) >  IMPRESSION:      1. Aorta is normal in size without evidence for intramural hematoma or   dissection.      2. 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    3. Interstitial pulmonary edema.    4. Mild mediastinal lymphadenopathy, likely reactive.    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 On his right lower extremity, he has no pain, numbness, tingling, motor, or sensory deficit. Patient presented to the ED with sudden onset chest pain radiating to the back. The ED obtained a CTA chest to rule out aortic dissection. There was no dissection in the aorta but there was an incidentally noted right common iliac artery dissection. Patient was found to have new onset a fib and is being evaluated for possible cardioversion.     PAST MEDICAL & SURGICAL HISTORY:  Dyslipidemia  Stented coronary artery  HTN (hypertension)  Chronic obstructive pulmonary disease (COPD)  CAD (coronary artery disease)  Diabetes  CHF (congestive heart failure)  Heart valve replaced  H/O heart artery stent        MEDICATIONS  (STANDING):  aMIOdarone Infusion 1 mG/Min (33.333 mL/Hr) IV Continuous <Continuous>  aspirin enteric coated 81 milliGRAM(s) Oral daily  atorvastatin 40 milliGRAM(s) Oral at bedtime  budesonide 160 MICROgram(s)/formoterol 4.5 MICROgram(s) Inhaler 2 Puff(s) Inhalation two times a day  clopidogrel Tablet 75 milliGRAM(s) Oral daily  fenofibrate Tablet 145 milliGRAM(s) Oral daily  heparin  Infusion 1850 Unit(s)/Hr (18.5 mL/Hr) IV Continuous <Continuous>  sodium chloride 0.9% Bolus 500 milliLiter(s) IV Bolus once  zolpidem 5 milliGRAM(s) Oral at bedtime    MEDICATIONS  (PRN):      Allergies    sulfa drugs (Unknown)    Intolerances        REVIEW OF SYSTEMS    [x] A ten-point review of systems was otherwise negative except as noted.  [ ] Due to altered mental status/intubation, subjective information were not able to be obtained from the patient. History was obtained, to the extent possible, from review of the chart and collateral sources of information.      Vital Signs Last 24 Hrs  T(C): 36.2 (02 Dec 2019 04:43), Max: 36.2 (02 Dec 2019 04:43)  T(F): 97.2 (02 Dec 2019 04:43), Max: 97.2 (02 Dec 2019 04:43)  HR: 111 (02 Dec 2019 12:01) (95 - 190)  BP: 122/89 (02 Dec 2019 12:01) (94/43 - 152/74)  BP(mean): --  RR: 19 (02 Dec 2019 12:01) (18 - 19)  SpO2: 96% (02 Dec 2019 12:01) (96% - 98%)    PHYSICAL EXAM:  GENERAL: NAD, well-appearing  CHEST/LUNG: Clear to auscultation bilaterally  HEART: irregularly irregular rhythm   ABDOMEN: Soft, Nontender, Nondistended;   EXTREMITIES:  No clubbing, cyanosis, or edema  PULSES: Feet warm and well perfused, femoral artery, DP and PT pulses palpable b/l,       LABS:  Labs:  CAPILLARY BLOOD GLUCOSE                              16.9   11.99 )-----------( 220      ( 02 Dec 2019 05:00 )             50.2       Auto Neutrophil %: 72.0 % (12-02-19 @ 05:00)  Auto Immature Granulocyte %: 0.4 % (12-02-19 @ 05:00)    12-02    137  |  99  |  21<H>  ----------------------------<  351<H>  4.0   |  21  |  1.4      Calcium, Total Serum: 9.5 mg/dL (12-02-19 @ 05:00)      LFTs:             6.8  | 0.4  | 31       ------------------[42      ( 02 Dec 2019 05:00 )  4.3  | x    | 46          Lipase:x      Amylase:x             Coags:     x      ----< x       ( 02 Dec 2019 11:43 )     48.1        CARDIAC MARKERS ( 02 Dec 2019 11:43 )  x     / 0.47 ng/mL / 596 U/L / x     / 81.4 ng/mL  CARDIAC MARKERS ( 02 Dec 2019 05:00 )  x     / 0.10 ng/mL / x     / x     / x          Serum Pro-Brain Natriuretic Peptide: 568 pg/mL (12-02-19 @ 05:00)        RADIOLOGY & ADDITIONAL STUDIES:  < from: CT Angio Chest Dissection Protocol (12.02.19 @ 10:34) >  IMPRESSION:      1. Aorta is normal in size without evidence for intramural hematoma or   dissection.      2. 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    3. Interstitial pulmonary edema.    4. Mild mediastinal lymphadenopathy, likely reactive.    < end of copied text >

## 2019-12-02 NOTE — CONSULT NOTE ADULT - ASSESSMENT
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 Patient has no symptoms related to his dissection however he was found to have new onset a fib and is being managed by medical team.     Plan:  - obtain b/l lower extremity arterial duplex for better characterization of dissection

## 2019-12-02 NOTE — H&P ADULT - ATTENDING COMMENTS
HPI as above.  Interval history: Pt seen and examined at bedside. Pt states that he was at home sitting and watching TV. He started having chest pain that he describes as a pressure like pain, radiated to his back, intermittent.  He had no cp or sob now. Denied any palpitations. Currently smoker.   Vital Signs (24 Hrs):  T(C): 36.9 (12-03-19 @ 07:25), Max: 36.9 (12-03-19 @ 07:25)  HR: 90 (12-03-19 @ 09:19) (80 - 92)  BP: 169/88 (12-03-19 @ 09:19) (116/67 - 190/79)  RR: 18 (12-03-19 @ 09:19) (16 - 20)  SpO2: 94% (12-03-19 @ 09:19) (94% - 100%)  Wt(kg): --  Daily     Daily     I&O's Summary    02 Dec 2019 07:01  -  03 Dec 2019 07:00  --------------------------------------------------------  IN: 33.3 mL / OUT: 0 mL / NET: 33.3 mL    03 Dec 2019 07:01  -  03 Dec 2019 14:04  --------------------------------------------------------  IN: 75.4 mL / OUT: 155 mL / NET: -79.6 mL      PHYSICAL EXAM:  GENERAL: NAD, well-developed  HEAD:  Atraumatic, Normocephalic  EYES: EOMI, PERRLA, conjunctiva and sclera clear  NECK: Supple, No JVD  CHEST/LUNG: Clear to auscultation bilaterally; No wheeze  HEART: Regular rate and irregular rhythm; + murmurs, rubs, or gallops  ABDOMEN: Soft, Nontender, Nondistended; Bowel sounds present  EXTREMITIES:  2+ Peripheral Pulses, No clubbing, cyanosis, or edema  PSYCH: AAOx3  NEUROLOGY: non-focal  SKIN: No rashes or lesions    Labs reviewed  Imaging reviewed: < from: CT Angio Chest Dissection Protocol (12.02.19 @ 10:34) >    IMPRESSION:      1. Aorta is normal in size without evidence for intramural hematoma or   dissection.      2. 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    3. Interstitial pulmonary edema.    4. Mild mediastinal lymphadenopathy, likely reactive.    < end of copied text >    < from: Transthoracic Echocardiogram (12.03.19 @ 10:38) >    Summary:   1. Left ventricular ejection fraction, by visual estimation, is 30 to   35%.   2. Severely decreased global left ventricular systolic function.   3. Multiple left ventricular regional wall motion abnormalities exist.   See wall motion findings.   4. Elevated left atrial and left ventricular end-diastolic pressures.   5. Mildly increased LV wall thickness.   6. Spectral Doppler shows pseudonormal pattern of left ventricular   myocardial filling (Grade II diastolic dysfunction).   7. Moderate mitral annular calcification.   8. Moderate aortic valve stenosis.   9. LA volume Index is 47.2 ml/m² ml/m2.  10. Mitral valve mean gradient is 1.9 mmHg consistent with normal mitral   stenosis.  11. Peak transaortic gradient equals 44.3 mmHg, mean transaortic gradient   equals 24.1 mmHg, the calculated aortic valve area equals 1.55 cm² by the   continuity equation consistent with moderate aortic stenosis.    < end of copied text >  EKG reviewed: < from: 12 Lead ECG (12.02.19 @ 05:37) >  Diagnosis Line Atrial fibrillation with rapid ventricular response  Left axis deviation  Left bundle branch block  Abnormal ECG    Inversions on previous EKG    Plan  #Afib with RVR  -pt has multiple risk factors: has Mod AS, Low EF, HTN, age, possible sleep apnea, smoker  -chadsvasc 4, continue with heparin ggt, will need anticoagulant  -Recent cath with stents, echo shows RWA   -dw cardio- they will review cath and echo  -continue amio ggt   -sleep study outpt    #CAD- s/p stent- cw plavix asa, will need Beta-blocker therapy, acei if bp tolerates, continue statin, may need to dc plavix and start anticoagulant on DC dw cardio, cardiology reccs    #Illiac artery dissection- vascular consult, follow up [facility] artery duplex HPI as above.  Interval history: Pt seen and examined at bedside. Pt states that he was at home sitting and watching TV. He started having chest pain that he describes as a pressure like pain, radiated to his back, intermittent.  He had no cp or sob now. Denied any palpitations. Currently smoker.   Vital Signs (24 Hrs):  T(C): 36.9 (12-03-19 @ 07:25), Max: 36.9 (12-03-19 @ 07:25)  HR: 90 (12-03-19 @ 09:19) (80 - 92)  BP: 169/88 (12-03-19 @ 09:19) (116/67 - 190/79)  RR: 18 (12-03-19 @ 09:19) (16 - 20)  SpO2: 94% (12-03-19 @ 09:19) (94% - 100%)  Wt(kg): --  Daily     Daily     I&O's Summary    02 Dec 2019 07:01  -  03 Dec 2019 07:00  --------------------------------------------------------  IN: 33.3 mL / OUT: 0 mL / NET: 33.3 mL    03 Dec 2019 07:01  -  03 Dec 2019 14:04  --------------------------------------------------------  IN: 75.4 mL / OUT: 155 mL / NET: -79.6 mL      PHYSICAL EXAM:  GENERAL: NAD, well-developed  HEAD:  Atraumatic, Normocephalic  EYES: EOMI, PERRLA, conjunctiva and sclera clear  NECK: Supple, No JVD  CHEST/LUNG: Clear to auscultation bilaterally; No wheeze  HEART: Regular rate and irregular rhythm; + murmurs, rubs, or gallops  ABDOMEN: Soft, Nontender, Nondistended; Bowel sounds present  EXTREMITIES:  2+ Peripheral Pulses, No clubbing, cyanosis, or edema  PSYCH: AAOx3  NEUROLOGY: non-focal  SKIN: No rashes or lesions    Labs reviewed  Imaging reviewed: < from: CT Angio Chest Dissection Protocol (12.02.19 @ 10:34) >    IMPRESSION:      1. Aorta is normal in size without evidence for intramural hematoma or   dissection.      2. 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    3. Interstitial pulmonary edema.    4. Mild mediastinal lymphadenopathy, likely reactive.    < end of copied text >    < from: Transthoracic Echocardiogram (12.03.19 @ 10:38) >    Summary:   1. Left ventricular ejection fraction, by visual estimation, is 30 to   35%.   2. Severely decreased global left ventricular systolic function.   3. Multiple left ventricular regional wall motion abnormalities exist.   See wall motion findings.   4. Elevated left atrial and left ventricular end-diastolic pressures.   5. Mildly increased LV wall thickness.   6. Spectral Doppler shows pseudonormal pattern of left ventricular   myocardial filling (Grade II diastolic dysfunction).   7. Moderate mitral annular calcification.   8. Moderate aortic valve stenosis.   9. LA volume Index is 47.2 ml/m² ml/m2.  10. Mitral valve mean gradient is 1.9 mmHg consistent with normal mitral   stenosis.  11. Peak transaortic gradient equals 44.3 mmHg, mean transaortic gradient   equals 24.1 mmHg, the calculated aortic valve area equals 1.55 cm² by the   continuity equation consistent with moderate aortic stenosis.    < end of copied text >  EKG reviewed: < from: 12 Lead ECG (12.02.19 @ 05:37) >  Diagnosis Line Atrial fibrillation with rapid ventricular response  Left axis deviation  Left bundle branch block  Abnormal ECG    Inversions on previous EKG    Plan  #Afib with RVR  -pt has multiple risk factors: has Mod AS, Low EF, HTN, age, possible sleep apnea, smoker  -chadsvasc 4, continue with heparin ggt, will need anticoagulant  -Recent cath with stents, echo shows RWA   -dw cardio- they will review cath and echo  -continue amio ggt   -sleep study outpt    #smoker-smoking cessation counseling done.     #CAD- s/p stent- cw plavix asa, will need Beta-blocker therapy, acei if bp tolerates, continue statin, may need to dc plavix and start anticoagulant on DC dw cardio, cardiology reccs    #Illiac artery dissection- vascular consult, follow up [facility] artery duplex

## 2019-12-02 NOTE — ED PROVIDER NOTE - PROGRESS NOTE DETAILS
pt with h/o CAD, s/p multiple stents, in ER with CP.  CP now gone.  in ER - Afib with RVR, old LBBB. Initial BP ok, then borderline BP - 's-110's.  pt given dose of cardizem, 's-150's.  started on card drip.  Pt with no symptoms while in ER, states CP has resolved, not feeling lightheaded or dizzy.  no SOB.  Pt seen in ER by card fellow, pt given iv lopressor - 's-110's, BP remains borderline.  case d/w EP, Dr. Rogers, pt to go for cardioversion this am, recommends starting amio drip.  cardizem drip stopped and pt given amio bolus and drip ordered.  pt also started on heparin drip. pt with h/o CAD, s/p multiple stents, in ER with CP.  CP now gone.  in ER - Afib with RVR, old LBBB. Initial BP ok, then borderline BP - 's-110's.  pt given dose of cardizem, 's-150's.  started on card drip.  Pt with no symptoms while in ER, states CP has resolved, not feeling lightheaded or dizzy.  no SOB.  Pt seen in ER by card fellow, pt given iv lopressor - 's-110's, BP remains borderline.  case d/w EP, Dr. Rogers, pt to go for cardioversion this am, recommends starting amio drip.  cardizem drip stopped and pt given amio bolus and drip ordered.  pt also started on heparin drip. trop 0.1.  Pt admitted to CCU. pt with h/o CAD, s/p multiple stents, in ER with CP.  CP now gone.  in ER - Afib with RVR, old LBBB. Initial BP ok, then borderline BP - 's-110's.  pt given dose of cardizem, 's-150's.  started on card drip.  Pt with no symptoms while in ER, states CP has resolved, not feeling lightheaded or dizzy.  no SOB.  Pt seen in ER by card fellow, pt given iv lopressor - 's-110's, BP remained borderline.  case d/w EP, Dr. Rogers, pt to go for cardioversion this am, recommends starting amio drip.  cardizem drip stopped and pt given amio bolus and drip ordered.  pt also started on heparin drip. trop 0.1.  Pt admitted to CCU. HR 90's-100's, sbp 100's. SC: PT with AFib refractory to cardizem pushes 5mg x2, cardizem drip. Dr. Rogers consulted who recommends admiodarone drip and heparin drip, then cardioversion later this morning. PT to be admitted to CCU. Patient to be admitted to an inpatient floor. Case discussed with and care endorsed to medical admitting resident. Admitting physician notified.

## 2019-12-02 NOTE — CONSULT NOTE ADULT - SUBJECTIVE AND OBJECTIVE BOX
Patient is a 64y old  Male who presents with a chief complaint of Atrial fibrillation new onset (02 Dec 2019 09:39)    HPI: Pt is a 65 yo M with hx of CAD sp PCI, ICM EF 30%, DM, AV replacement (bovine valve), COPD and HTN who p/w chest pain which started while at rest. Pt reports mid sternal pressure which radiated to the back while he was sitting watching TV. He took two SL Nitro with no relief and pain subsided while in the ED. Pt sts he had mild burning pain as well and felt it was due to previous heavy meal. Denies any SOB, palpitations, dizziness or syncope. Pt has no previous hx of arrhythmias in the past. Sts he is feeling much better now. He was started on Cardizem drip in ED but became hypotensive SBP 90s-100 so Cardizem was D/C'ed and Amiodarone started.     PAST MEDICAL & SURGICAL HISTORY:  Dyslipidemia  Stented coronary artery  HTN (hypertension)  Chronic obstructive pulmonary disease (COPD)  CAD (coronary artery disease)  Diabetes  CHF (congestive heart failure)  Heart valve replaced  H/O heart artery stent    PREVIOUS DIAGNOSTIC TESTING:      ECHO  FINDINGS:  Transthoracic Echocardiogram (19 @ 11:03)    Summary:   1. Severely decreased segmental left ventricular systolic function.   2. LV Ejection Fraction by Beck's Method with a biplane EF of 30 %.   3. Increased LV wall thickness.   4.Spectral Doppler shows impaired relaxation pattern of left   ventricular myocardial filling (Grade I diastolic dysfunction).   5. Mild mitral valve regurgitation.   6. Thickening and calcification of the anterior and posterior mitral   valve leaflets.   7. Bioprosthesis in the aortic position.   8. Peak transaortic gradient equals 42.8 mmHg, mean transaortic gradient   equals 24.8 mmHg.    STRESS  FINDINGS:    CATHETERIZATION  FINDINGS:  Cardiac Cath Lab - Adult (10.11.19 @ 07:46)  IMPRESSIONS: There is significant single vessel coronary artery disease.    RECOMMENDATIONS:    Patient management should include aggressive medical therapy, close    monitoring of BUN and creatinine, and aggressive risk factor modification.    The patient should follow a low fat and low calorie diet.    Recommend PCI immediately following this diagnostic procedure.    ELECTROPHYSIOLOGY STUDY  FINDINGS:    CAROTID ULTRASOUND:  FINDINGS    VENOUS DUPLEX SCAN:  FINDINGS:    CHEST CT PULMONARY ANGIO with IV Contrast:  FINDINGS:    MEDICATIONS  (STANDING):  aMIOdarone Infusion 1 mG/Min (33.333 mL/Hr) IV Continuous <Continuous>  aspirin enteric coated 81 milliGRAM(s) Oral daily  atorvastatin 40 milliGRAM(s) Oral at bedtime  budesonide 160 MICROgram(s)/formoterol 4.5 MICROgram(s) Inhaler 2 Puff(s) Inhalation two times a day  clopidogrel Tablet 75 milliGRAM(s) Oral daily  fenofibrate Tablet 145 milliGRAM(s) Oral daily  heparin  Infusion 1850 Unit(s)/Hr (18.5 mL/Hr) IV Continuous <Continuous>  sodium chloride 0.9% Bolus 500 milliLiter(s) IV Bolus once  zolpidem 5 milliGRAM(s) Oral at bedtime    FAMILY HISTORY: No significant family hx      SOCIAL HISTORY:    CIGARETTES: +Smoker    ALCOHOL: No    Past Surgical History: AV Replacement    Allergies:  sulfa drugs (Unknown)      REVIEW OF SYSTEMS:  CONSTITUTIONAL: No fever, weight loss, chills, shakes, or fatigue  RESPIRATORY: No cough, wheezing, hemoptysis, or shortness of breath  CARDIOVASCULAR: + chest pain; No dyspnea, palpitations, dizziness, syncope, paroxysmal nocturnal dyspnea, orthopnea, or arm or leg swelling  NEUROLOGICAL: No headaches, memory loss, slurred speech, limb weakness, loss of strength, numbness, or tremors  MUSCULOSKELETAL: No joint pain or swelling, muscle, back, or extremity pain    Vital Signs Last 24 Hrs  T(C): 36.2 (02 Dec 2019 04:43), Max: 36.2 (02 Dec 2019 04:43)  T(F): 97.2 (02 Dec 2019 04:43), Max: 97.2 (02 Dec 2019 04:43)  HR: 95 (02 Dec 2019 07:05) (95 - 190)  BP: 100/50 (02 Dec 2019 07:05) (94/43 - 152/74)  BP(mean): --  RR: 18 (02 Dec 2019 04:43) (18 - 18)  SpO2: 98% (02 Dec 2019 04:43) (98% - 98%)    PHYSICAL EXAM:  GENERAL: In no apparent distress, well nourished, and hydrated.  HEAD:  Atraumatic, Normocephalic  HEART: Irregular rate and rhythm; No murmurs, rubs, or gallops.  PULMONARY: Clear to auscultation and perfusion.  No rales, wheezing, or rhonchi bilaterally.  ABDOMEN: Soft, Nontender, Nondistended; Bowel sounds present  EXTREMITIES:  2+ Peripheral Pulses, No clubbing, cyanosis, or edema    INTERPRETATION OF TELEMETRY: AFib -130 bpm    EC Lead ECG (19 @ 05:37)  Ventricular Rate 149 BPM    Atrial Rate 125 BPM    QRS Duration 150 ms    Q-T Interval 334 ms    QTC Calculation(Bezet) 526 ms    R Axis -55 degrees    T Axis 141 degrees    Diagnosis Line Atrial fibrillation with rapid ventricular response  Left axis deviation  Left bundle branch block  Abnormal ECG    Confirmed by Arielle Sawyer MD ([id number]) on 2019 8:03:14 AM    I&O's Detail      LABS:                        16.9   11.99 )-----------( 220      ( 02 Dec 2019 05:00 )             50.2         137  |  99  |  21<H>  ----------------------------<  351<H>  4.0   |  21  |  1.4    Ca    9.5      02 Dec 2019 05:00    TPro  6.8  /  Alb  4.3  /  TBili  0.4  /  DBili  x   /  AST  31  /  ALT  46<H>  /  AlkPhos  42      CARDIAC MARKERS ( 02 Dec 2019 05:00 )  x     / 0.10 ng/mL / x     / x     / x          PT/INR - ( 02 Dec 2019 07:00 )   PT: 11.60 sec;   INR: 1.01 ratio         PTT - ( 02 Dec 2019 07:00 )  PTT:32.6 sec    BNPSerum Pro-Brain Natriuretic Peptide: 568 pg/mL ( @ 05:00)    I&O's Detail    Daily     Daily     RADIOLOGY & ADDITIONAL STUDIES:  Xray Chest 2 Views PA/Lat (19 @ 15:15)    EXAM:  XR CHEST PA LAT 2V            PROCEDURE DATE:  2019      INTERPRETATION:  Clinical History/Reason for Exam:  copd-r/o pna  Comparison : Chest x-ray-  2019      Findings:    Technique/Positioning:  PA and lateral views    Support devices:  none    Cardiac/mediastinum/hilum: Poststernotomy. Cardiac valve surgery. Stable   cardiac silhouette. Multiple surgical clips, EG junction.    Lung parenchyma/ Pleura: No focal parenchymal opacities, effusion or   pneumothorax is present.      Skeleton/soft tissues: No focal skeletal lesions are identified.      Impression:      No consolidation, effusion or pneumothorax.    JAZLYN LO M.D., ATTENDING RADIOLOGIST  This document has been electronically signed. Sep 12 2019  4:47PM

## 2019-12-02 NOTE — H&P ADULT - ASSESSMENT
64 year old male  known to have COPD not on home O2, CAD w/ stent (recent in Oct), HFrEF, HTN, DM II, DLD presented to the ED for chest pain.  In the ED, Pt was found to be in new onset AFIB with RVR. he was given cardizem 5 mg iv push x 2 times. His BP then dropped to 80s systolic. He was given 1 L IVF which helped improve BP to 100s systolic but then again remained in 90s systolic.  Another 500 bolus ordered. Pt was seen by cardiology and recommended amiodarone bolus and infusion. Pt stated the chest pain resolved but denied palpitations, sob, nausea, lightheadedness. Urgent CT chest angio dissection ordered.       Assessment and Plan:    Chest pain: ekg showed old LBBB 64 year old male  known to have COPD not on home O2, CAD w/ stent (recent in Oct), HFrEF, HTN, DM II, DLD presented to the ED for chest pain.  In the ED, Pt was found to be in new onset AFIB with RVR. he was given cardizem 5 mg iv push x 2 times. His BP then dropped to 80s systolic. He was given 1 L IVF which helped improve BP to 100s systolic but then again remained in 90s systolic.  Another 500 bolus ordered. Pt was seen by cardiology and recommended amiodarone bolus and infusion. Pt stated the chest pain resolved but denied palpitations, sob, nausea, lightheadedness. Urgent CT chest angio dissection ordered.       Assessment and Plan:    Chest pain: rule out ACS vs Aortic dissection  ekg showed old LBBB   Cardiac enzymes 0.10, repeat ordered (similar pattern as in past) 64 year old male  known to have COPD not on home O2, CAD w/ stent (recent in Oct), HFrEF, HTN, DM II, DLD presented to the ED for chest pain.  In the ED, Pt was found to be in new onset AFIB with RVR. he was given cardizem 5 mg iv push x 2 times. His BP then dropped to 80s systolic. He was given 1 L IVF which helped improve BP to 100s systolic but then again remained in 90s systolic.  Another 500 bolus ordered. Pt was seen by cardiology and recommended amiodarone bolus and infusion. Pt stated the chest pain resolved but denied palpitations, sob, nausea, lightheadedness. Urgent CT chest angio dissection ordered.       Assessment and Plan:    Chest pain: rule out ACS vs Aortic dissection  H/o CAD  H/o COPD not on home O2.  Active smoker  Heart failure with reduced ejection fraction: EF 30%   HTN  DM on insulin  DLD      PLAN:    CNS: Avoid sedatives    HEENT: Oral care, Angle of bed 45     PULMONARY: Supplemental O2 as needed, CXR in AM     CARDIOVASCULAR: continue with amiodarone and heparin drip. NPO for possible KUSH/cardioversion. fu EP and cardio recs, fu CT CHEST ANGIO     GI: Protonix 18 guage IV x2, Keep active type and screen     RENAL: f/U electrolytes and replete as needed     INFECTIOUS DISEASE: Hold antibiotics for now     HEMATOLOGICAL:  DVT prophylaxis: heparin drip    ENDOCRINE:  Follow up FS.  Insulin protocol if needed.    MUSCULOSKELETAL: VA duplex lower extremity ordered

## 2019-12-02 NOTE — ED PROVIDER NOTE - OBJECTIVE STATEMENT
64M with pmh of catheterization x2 in early October, DM, HTN, HLD and bovine AVR not on AC presents with substernal crushing CP without radiation or exacerbating or relieving factors for the past 2 hours. PT denies SOB, n/v/d, abd pain, LE pain or swelling.  Card: Dr. Solano

## 2019-12-02 NOTE — H&P ADULT - HISTORY OF PRESENT ILLNESS
64 year old male  known to have COPD not on home O2, CAD w/ stent (recent in Oct), HFpEF, HTN, DM II, DLD presented to the ED for chst pain.  As per pt, he suddenly started having chest pain, started at around 2:30 am while he was watching tv, central, crushing pain, constant, radiating to the back between shoulder blades.  complaining of productive cough and chest pain. Cough started on day of presentation with subjective fever and associated chest pain that feels like chest tightness and worse on exertion. Patient is a current daily smoker of 3/4 of pack daily for > 20 years, last cigarette was on day prior to presentation. Patient does not have a pulmonologist. 64 year old male  known to have COPD not on home O2, CAD w/ stent (recent in Oct), HFpEF, HTN, DM II, DLD presented to the ED for chest pain.  As per pt, he suddenly started having chest pain, started at around 2:30 am while he was watching tv, central, crushing pain, constant, radiating to the back between shoulder blades, 8/10 on severity. It lasted for 3 hours until pt came to ED. He tried taking 1 tablet of nitro and 3 aleeve at home but it did not help. He then drove to ED.  Patient is a current daily smoker of 3/4 of pack daily for > 20 years, last cigarette was on day prior to presentation. Patient does not have a pulmonologist.    In the ED, Pt was found to be in new onset AFIB with RVR. he was given cardizem 5 mg iv push x 2 times. His BP then dropped to 80s systolic 64 year old male  known to have COPD not on home O2, CAD w/ stent (recent in Oct), HFrEF, HTN, DM II, DLD presented to the ED for chest pain.  As per pt, he suddenly started having chest pain, started at around 2:30 am while he was watching tv, central, crushing pain, constant, radiating to the back between shoulder blades, 8/10 on severity. It lasted for 3 hours until pt came to ED. He tried taking 1 tablet of nitro and 3 aleeve at home but it did not help. He then drove to ED.  Patient is a current daily smoker of 3/4 of pack daily for > 20 years, last cigarette was on day prior to presentation. Patient does not have a pulmonologist.    In the ED, Pt was found to be in new onset AFIB with RVR. he was given cardizem 5 mg iv push x 2 times. His BP then dropped to 80s systolic. He was given 1 L IVF which helped improve BP to 100s systolic but then again remained in 90s systolic.  Another 500 bolus ordered. Pt was seen by cardiology and recommended amiodarone bolus and infusion. Pt stated the chest pain resolved but denied palpitations, sob, nausea, lightheadedness. Urgent CT chest angio dissection ordered.

## 2019-12-02 NOTE — ED ADULT NURSE NOTE - OBJECTIVE STATEMENT
pt is a 65 yo male pw chest pain that started about 2 hours ago. Pt took one SL nitro prior to arrival that without relief. EKG taken in triage showed rapid afib with rvr. denies SOB, recent travel, nausea, vomiting, diarrhea, diaphoresis, headache, dizziness, loc, cough, fever, trauma, numbness, tingling, urinary symptoms.

## 2019-12-02 NOTE — ED PROVIDER NOTE - CRITICAL CARE INDICATION, MLM
Statement Selected patient was critically ill... Patient was critically ill with a high probability of imminent or life threatening deterioration.

## 2019-12-03 LAB
ALBUMIN SERPL ELPH-MCNC: 4.1 G/DL — SIGNIFICANT CHANGE UP (ref 3.5–5.2)
ALP SERPL-CCNC: 37 U/L — SIGNIFICANT CHANGE UP (ref 30–115)
ALT FLD-CCNC: 49 U/L — HIGH (ref 0–41)
ANION GAP SERPL CALC-SCNC: 17 MMOL/L — HIGH (ref 7–14)
APTT BLD: 51.2 SEC — HIGH (ref 27–39.2)
APTT BLD: 53.3 SEC — HIGH (ref 27–39.2)
APTT BLD: 91.9 SEC — CRITICAL HIGH (ref 27–39.2)
AST SERPL-CCNC: 57 U/L — HIGH (ref 0–41)
BASOPHILS # BLD AUTO: 0.08 K/UL — SIGNIFICANT CHANGE UP (ref 0–0.2)
BASOPHILS NFR BLD AUTO: 0.7 % — SIGNIFICANT CHANGE UP (ref 0–1)
BILIRUB SERPL-MCNC: 0.6 MG/DL — SIGNIFICANT CHANGE UP (ref 0.2–1.2)
BUN SERPL-MCNC: 17 MG/DL — SIGNIFICANT CHANGE UP (ref 10–20)
CALCIUM SERPL-MCNC: 9.1 MG/DL — SIGNIFICANT CHANGE UP (ref 8.5–10.1)
CHLORIDE SERPL-SCNC: 101 MMOL/L — SIGNIFICANT CHANGE UP (ref 98–110)
CK MB CFR SERPL CALC: 11.7 NG/ML — HIGH (ref 0.6–6.3)
CK SERPL-CCNC: 319 U/L — HIGH (ref 0–225)
CO2 SERPL-SCNC: 21 MMOL/L — SIGNIFICANT CHANGE UP (ref 17–32)
CREAT SERPL-MCNC: 1.2 MG/DL — SIGNIFICANT CHANGE UP (ref 0.7–1.5)
EOSINOPHIL # BLD AUTO: 0.37 K/UL — SIGNIFICANT CHANGE UP (ref 0–0.7)
EOSINOPHIL NFR BLD AUTO: 3.2 % — SIGNIFICANT CHANGE UP (ref 0–8)
GLUCOSE SERPL-MCNC: 176 MG/DL — HIGH (ref 70–99)
HCT VFR BLD CALC: 46.2 % — SIGNIFICANT CHANGE UP (ref 42–52)
HGB BLD-MCNC: 15.7 G/DL — SIGNIFICANT CHANGE UP (ref 14–18)
IMM GRANULOCYTES NFR BLD AUTO: 0.6 % — HIGH (ref 0.1–0.3)
LYMPHOCYTES # BLD AUTO: 18.1 % — LOW (ref 20.5–51.1)
LYMPHOCYTES # BLD AUTO: 2.07 K/UL — SIGNIFICANT CHANGE UP (ref 1.2–3.4)
MAGNESIUM SERPL-MCNC: 1.8 MG/DL — SIGNIFICANT CHANGE UP (ref 1.8–2.4)
MCHC RBC-ENTMCNC: 29.3 PG — SIGNIFICANT CHANGE UP (ref 27–31)
MCHC RBC-ENTMCNC: 34 G/DL — SIGNIFICANT CHANGE UP (ref 32–37)
MCV RBC AUTO: 86.4 FL — SIGNIFICANT CHANGE UP (ref 80–94)
MONOCYTES # BLD AUTO: 0.56 K/UL — SIGNIFICANT CHANGE UP (ref 0.1–0.6)
MONOCYTES NFR BLD AUTO: 4.9 % — SIGNIFICANT CHANGE UP (ref 1.7–9.3)
NEUTROPHILS # BLD AUTO: 8.29 K/UL — HIGH (ref 1.4–6.5)
NEUTROPHILS NFR BLD AUTO: 72.5 % — SIGNIFICANT CHANGE UP (ref 42.2–75.2)
NRBC # BLD: 0 /100 WBCS — SIGNIFICANT CHANGE UP (ref 0–0)
PLATELET # BLD AUTO: 191 K/UL — SIGNIFICANT CHANGE UP (ref 130–400)
POTASSIUM SERPL-MCNC: 3.8 MMOL/L — SIGNIFICANT CHANGE UP (ref 3.5–5)
POTASSIUM SERPL-SCNC: 3.8 MMOL/L — SIGNIFICANT CHANGE UP (ref 3.5–5)
PROT SERPL-MCNC: 6.3 G/DL — SIGNIFICANT CHANGE UP (ref 6–8)
RBC # BLD: 5.35 M/UL — SIGNIFICANT CHANGE UP (ref 4.7–6.1)
RBC # FLD: 13.2 % — SIGNIFICANT CHANGE UP (ref 11.5–14.5)
SODIUM SERPL-SCNC: 139 MMOL/L — SIGNIFICANT CHANGE UP (ref 135–146)
TROPONIN T SERPL-MCNC: 0.43 NG/ML — CRITICAL HIGH
WBC # BLD: 11.44 K/UL — HIGH (ref 4.8–10.8)
WBC # FLD AUTO: 11.44 K/UL — HIGH (ref 4.8–10.8)

## 2019-12-03 PROCEDURE — 93925 LOWER EXTREMITY STUDY: CPT | Mod: 26

## 2019-12-03 PROCEDURE — 93970 EXTREMITY STUDY: CPT | Mod: 26

## 2019-12-03 PROCEDURE — 76770 US EXAM ABDO BACK WALL COMP: CPT | Mod: 26

## 2019-12-03 PROCEDURE — 71045 X-RAY EXAM CHEST 1 VIEW: CPT | Mod: 26

## 2019-12-03 PROCEDURE — 99223 1ST HOSP IP/OBS HIGH 75: CPT | Mod: 25

## 2019-12-03 PROCEDURE — 99406 BEHAV CHNG SMOKING 3-10 MIN: CPT

## 2019-12-03 PROCEDURE — 99222 1ST HOSP IP/OBS MODERATE 55: CPT

## 2019-12-03 PROCEDURE — 93306 TTE W/DOPPLER COMPLETE: CPT | Mod: 26

## 2019-12-03 RX ORDER — AMIODARONE HYDROCHLORIDE 400 MG/1
0.5 TABLET ORAL
Qty: 900 | Refills: 0 | Status: DISCONTINUED | OUTPATIENT
Start: 2019-12-03 | End: 2019-12-04

## 2019-12-03 RX ORDER — HEPARIN SODIUM 5000 [USP'U]/ML
1900 INJECTION INTRAVENOUS; SUBCUTANEOUS
Qty: 25000 | Refills: 0 | Status: DISCONTINUED | OUTPATIENT
Start: 2019-12-03 | End: 2019-12-05

## 2019-12-03 RX ORDER — AMIODARONE HYDROCHLORIDE 400 MG/1
400 TABLET ORAL EVERY 12 HOURS
Refills: 0 | Status: DISCONTINUED | OUTPATIENT
Start: 2019-12-03 | End: 2019-12-04

## 2019-12-03 RX ORDER — AMIODARONE HYDROCHLORIDE 400 MG/1
TABLET ORAL
Refills: 0 | Status: DISCONTINUED | OUTPATIENT
Start: 2019-12-03 | End: 2019-12-04

## 2019-12-03 RX ADMIN — ZOLPIDEM TARTRATE 5 MILLIGRAM(S): 10 TABLET ORAL at 03:29

## 2019-12-03 RX ADMIN — HEPARIN SODIUM 19 UNIT(S)/HR: 5000 INJECTION INTRAVENOUS; SUBCUTANEOUS at 10:08

## 2019-12-03 RX ADMIN — ATORVASTATIN CALCIUM 40 MILLIGRAM(S): 80 TABLET, FILM COATED ORAL at 23:11

## 2019-12-03 RX ADMIN — AMIODARONE HYDROCHLORIDE 16.67 MG/MIN: 400 TABLET ORAL at 07:58

## 2019-12-03 RX ADMIN — BUDESONIDE AND FORMOTEROL FUMARATE DIHYDRATE 2 PUFF(S): 160; 4.5 AEROSOL RESPIRATORY (INHALATION) at 09:47

## 2019-12-03 RX ADMIN — Medication 145 MILLIGRAM(S): at 13:42

## 2019-12-03 RX ADMIN — BUDESONIDE AND FORMOTEROL FUMARATE DIHYDRATE 2 PUFF(S): 160; 4.5 AEROSOL RESPIRATORY (INHALATION) at 19:56

## 2019-12-03 RX ADMIN — AMIODARONE HYDROCHLORIDE 400 MILLIGRAM(S): 400 TABLET ORAL at 23:11

## 2019-12-03 RX ADMIN — CLOPIDOGREL BISULFATE 75 MILLIGRAM(S): 75 TABLET, FILM COATED ORAL at 13:42

## 2019-12-03 RX ADMIN — Medication 81 MILLIGRAM(S): at 13:42

## 2019-12-03 NOTE — ED ADULT NURSE REASSESSMENT NOTE - NS ED NURSE REASSESS COMMENT FT1
Md made aware pt sts unable to urinate and wants a iyer. sts "im going to leave if I don't get a iyer".

## 2019-12-03 NOTE — PROGRESS NOTE ADULT - ASSESSMENT
Plan  #Afib with RVR  -pt has multiple risk factors: has Mod AS, Low EF, HTN, age, possible sleep apnea, smoker  -chadsvasc 4, continue with heparin ggt, will need anticoagulant  -follow PTT  -Recent cath with stents, echo shows RWA   --continue amiodarone ggt   -switch it to PO amiodarone as per EP  -sleep study out pt  -follow     #smoker-smoking cessation counseling done.     #CAD- s/p stent- cw plavix asa, will need Beta-blocker therapy, acei if bp tolerates, continue statin, may need to dc plavix and start anticoagulant on DC dw cardio, cardiology reccs    #[place] artery dissection- vascular consult, follow up [facility] artery duplex . Plan    #Afib with RVR  -pt has multiple risk factors: has Mod AS, Low EF, HTN, age, possible sleep apnea, smoker  -chadsvasc 4, continue with heparin ggt, will need anticoagulant  -follow PTT  -Recent cath with stents, echo shows RWA   --continue amiodarone ggt   -switch it to PO amiodarone as per EP  -sleep study out pt  -follow cardio recs for any ischemic work up   -     #smoker  -smoking cessation      #CAD- s/p stent  - cw plavix asa, will need Beta-blocker therapy, acei if bp tolerates, continue statin    #[place] artery dissection- vascular consult, follow up [facility] artery duplex .  # PO dash diet  FULL CODE   # CCU monitoring ,down grade once approved by DR Rosado Plan    #Afib with RVR  -pt has multiple risk factors: has Mod AS, Low EF, HTN, age, possible sleep apnea, smoker  -chadsvasc 4, continue with heparin ggt, will need anticoagulant  -follow PTT  -Recent cath with stents, echo shows RWA   --continue amiodarone ggt   -switch it to PO amiodarone as per EP  -sleep study out pt  -follow cardio recs for any ischemic work up   - follow TSH and LFTs    #smoker  -smoking cessation      #CAD- s/p stent  - cw plavix asa, will need Beta-blocker therapy, acei if bp tolerates, continue statin    #[place] artery dissection- vascular consult, follow up [facility] artery duplex .  # PO dash diet  FULL CODE   # CCU monitoring ,down grade once approved by DR Rosado

## 2019-12-03 NOTE — PROGRESS NOTE ADULT - SUBJECTIVE AND OBJECTIVE BOX
Patient is a 64y old  Male who presents with a chief complaint of Atrial fibrillation new onset (02 Dec 2019 14:18)      Subjective:  Patient seen and examined at bedside.        Review Of Systems: No chest pain, shortness of breath, or palpitations       In sinus rhythm now.        Medications:  aMIOdarone Infusion 0.5 mG/Min IV Continuous <Continuous>  aspirin enteric coated 81 milliGRAM(s) Oral daily  atorvastatin 40 milliGRAM(s) Oral at bedtime  budesonide 160 MICROgram(s)/formoterol 4.5 MICROgram(s) Inhaler 2 Puff(s) Inhalation two times a day  clopidogrel Tablet 75 milliGRAM(s) Oral daily  fenofibrate Tablet 145 milliGRAM(s) Oral daily  heparin  Infusion 1900 Unit(s)/Hr IV Continuous <Continuous>  zolpidem 5 milliGRAM(s) Oral at bedtime      PAST MEDICAL & SURGICAL HISTORY:  Dyslipidemia  Stented coronary artery  HTN (hypertension)  Chronic obstructive pulmonary disease (COPD)  CAD (coronary artery disease)  Diabetes  CHF (congestive heart failure)  Heart valve replaced  H/O heart artery stent      Objective:  Vitals:  T(F): 97 (12-03), Max: 98.4 (12-03)  HR: 83 (12-03) (80 - 97)  BP: 139/67 (12-03) (116/67 - 190/79)  RR: 18 (12-03)  SpO2: 99% (12-03)  I&O's Summary    02 Dec 2019 07:01  -  03 Dec 2019 07:00  --------------------------------------------------------  IN: 33.3 mL / OUT: 0 mL / NET: 33.3 mL    03 Dec 2019 07:01  -  03 Dec 2019 15:42  --------------------------------------------------------  IN: 531.6 mL / OUT: 455 mL / NET: 76.6 mL        Physical Exam:  Appearance: No acute distress; well appearing  Eyes: PERRL, EOMI, pink conjunctiva  HENT: Normal oral muscosa  Cardiovascular: RRR, S1, S2, no murmurs, rubs, or gallops; no edema; no JVD  Respiratory: Clear to auscultation bilaterally  Gastrointestinal: soft, non-tender, non-distended with normal bowel sounds  Musculoskeletal: No clubbing; no joint deformity   Neurologic: Non-focal  Lymphatic: No lymphadenopathy  Psychiatry: AAOx3, mood & affect appropriate  Skin: No rashes, ecchymoses, or cyanosis                          15.7   11.44 )-----------( 191      ( 03 Dec 2019 06:09 )             46.2     12-03    139  |  101  |  17  ----------------------------<  176<H>  3.8   |  21  |  1.2    Ca    9.1      03 Dec 2019 06:09  Mg     1.8     12-03    TPro  6.3  /  Alb  4.1  /  TBili  0.6  /  DBili  x   /  AST  57<H>  /  ALT  49<H>  /  AlkPhos  37  12-03    PT/INR - ( 02 Dec 2019 07:00 )   PT: 11.60 sec;   INR: 1.01 ratio         PTT - ( 03 Dec 2019 06:09 )  PTT:91.9 sec  CARDIAC MARKERS ( 02 Dec 2019 17:16 )  x     / 0.61 ng/mL / x     / x     / x      CARDIAC MARKERS ( 02 Dec 2019 11:43 )  x     / 0.47 ng/mL / 596 U/L / x     / 81.4 ng/mL  CARDIAC MARKERS ( 02 Dec 2019 05:00 )  x     / 0.10 ng/mL / x     / x     / x          Serum Pro-Brain Natriuretic Peptide: 568 pg/mL (12-02 @ 05:00)          New ECG(s): Personally reviewed    Echo:  < from: Transthoracic Echocardiogram (12.03.19 @ 10:38) >  Summary:   1. Left ventricular ejection fraction, by visual estimation, is 30 to   35%.   2. Severely decreased global left ventricular systolic function.   3. Multiple left ventricular regional wall motion abnormalities exist.   See wall motion findings.   4. Elevated left atrial and left ventricular end-diastolic pressures.   5. Mildly increased LV wall thickness.   6. Spectral Doppler shows pseudonormal pattern of left ventricular   myocardial filling (Grade II diastolic dysfunction).   7. Moderate mitral annular calcification.   8. Moderate aortic valve stenosis.   9. LA volume Index is 47.2 ml/m² ml/m2.  10. Mitral valve mean gradient is 1.9 mmHg consistent with normal mitral   stenosis.  11. Peak transaortic gradient equals 44.3 mmHg, mean transaortic gradient   equals 24.1 mmHg, the calculated aortic valve area equals 1.55 cm² by the   continuity equation consistent with moderate aortic stenosis.    < end of copied text >        Interpretation of Telemetry: Sinus rhythm

## 2019-12-03 NOTE — PROGRESS NOTE ADULT - SUBJECTIVE AND OBJECTIVE BOX
LENGTH OF HOSPITAL STAY: 1d    CHIEF COMPLAINT:   Patient is a 64y old  Male who presents with a chief complaint of Atrial fibrillation new onset (03 Dec 2019 15:41)    EVENTS OVERNIGHT ;  no chest pain ,was in A fib in am but later convert to sinus as per EP  getting IV heparin drip       HISTORY OF PRESENTING ILLNESS:    HPI:  64 year old male  known to have COPD not on home O2, CAD w/ stent (recent in Oct), HFrEF, HTN, DM II, DLD presented to the ED for chest pain.  As per pt, he suddenly started having chest pain, started at around 2:30 am while he was watching tv, central, crushing pain, constant, radiating to the back between shoulder blades, 8/10 on severity. It lasted for 3 hours until pt came to ED. He tried taking 1 tablet of nitro and 3 aleve at home but it did not help. He then drove to ED.  Patient is a current daily smoker of 3/4 of pack daily for > 20 years, last cigarette was on day prior to presentation. Patient does not have a pulmonologist.    In the ED, Pt was found to be in new onset AFIB with RVR. he was given Cardizem 5 mg iv push x 2 times. His BP then dropped to 80s systolic. He was given 1 L IVF which helped improve BP to 100s systolic but then again remained in 90s systolic.  Another 500 bolus ordered. Pt was seen by cardiology and recommended amiodarone bolus and infusion. Pt stated the chest pain resolved but denied palpitations, sob, nausea, lightheadedness. Urgent CT chest angio dissection ordered. (02 Dec 2019 09:39)    PAST MEDICAL & SURGICAL HISTORY  PAST MEDICAL & SURGICAL HISTORY:  Dyslipidemia  Stented coronary artery  HTN (hypertension)  Chronic obstructive pulmonary disease (COPD)  CAD (coronary artery disease)  Diabetes  CHF (congestive heart failure)  Heart valve replaced  H/O heart artery stent    SOCIAL HISTORY:    ALLERGIES:  sulfa drugs (Unknown)    MEDICATIONS:  STANDING MEDICATIONS  aMIOdarone    Tablet   Oral   aMIOdarone Infusion 0.5 mG/Min IV Continuous <Continuous>  aspirin enteric coated 81 milliGRAM(s) Oral daily  atorvastatin 40 milliGRAM(s) Oral at bedtime  budesonide 160 MICROgram(s)/formoterol 4.5 MICROgram(s) Inhaler 2 Puff(s) Inhalation two times a day  clopidogrel Tablet 75 milliGRAM(s) Oral daily  fenofibrate Tablet 145 milliGRAM(s) Oral daily  heparin  Infusion 1900 Unit(s)/Hr IV Continuous <Continuous>  zolpidem 5 milliGRAM(s) Oral at bedtime    PRN MEDICATIONS    VITALS:   T(F): 97  HR: 83  BP: 139/67  RR: 18  SpO2: 99%    LABS:                        15.7   11.44 )-----------( 191      ( 03 Dec 2019 06:09 )             46.2     12-03    139  |  101  |  17  ----------------------------<  176<H>  3.8   |  21  |  1.2    Ca    9.1      03 Dec 2019 06:09  Mg     1.8     12-03    TPro  6.3  /  Alb  4.1  /  TBili  0.6  /  DBili  x   /  AST  57<H>  /  ALT  49<H>  /  AlkPhos  37  12-03    PT/INR - ( 02 Dec 2019 07:00 )   PT: 11.60 sec;   INR: 1.01 ratio         PTT - ( 03 Dec 2019 06:09 )  PTT:91.9 sec          CARDIAC MARKERS ( 02 Dec 2019 17:16 )  x     / 0.61 ng/mL / x     / x     / x      CARDIAC MARKERS ( 02 Dec 2019 11:43 )  x     / 0.47 ng/mL / 596 U/L / x     / 81.4 ng/mL  CARDIAC MARKERS ( 02 Dec 2019 05:00 )  x     / 0.10 ng/mL / x     / x     / x          RADIOLOGY:  < from: [facility] Duplex Lower Extrem Arterial, Bilat (12.03.19 @ 12:42) >  Impression:    Severe atherosclerotic occlusive disease bilateral superficial femoral   arteries.     < end of copied text >  < from: [facility] Duplex Lower Ext Vein Scan, Bilat (12.03.19 @ 12:39) >    Impression:    No evidence of deep venous thrombosis or superficial thrombophlebitis in   bilateral lower extremities.    < end of copied text >  < from: US Kidney and Bladder (12.03.19 @ 12:14) >  IMPRESSION:  Normal renal and bladder ultrasound.      < end of copied text >  < from: Transthoracic Echocardiogram (12.03.19 @ 10:38) >    Summary:   1. Left ventricular ejection fraction, by visual estimation, is 30 to   35%.   2. Severely decreased global left ventricular systolic function.   3. Multiple left ventricular regional wall motion abnormalities exist.   See wall motion findings.   4. Elevated left atrial and left ventricular end-diastolic pressures.   5. Mildly increased LV wall thickness.   6. Spectral Doppler shows pseudonormal pattern of left ventricular   myocardial filling (Grade II diastolic dysfunction).   7. Moderate mitral annular calcification.   8. Moderate aortic valve stenosis.   9. LA volume Index is 47.2 ml/m² ml/m2.  10. Mitral valve mean gradient is 1.9 mmHg consistent with normal mitral   stenosis.  11. Peak transaortic gradient equals 44.3 mmHg, mean transaortic gradient   equals 24.1 mmHg, the calculated aortic valve area equals 1.55 cm² by the   continuity equation consistent with moderate aortic stenosis.    < end of copied text >    < from: CT Angio Chest Dissection Protocol (12.02.19 @ 10:34) >    IMPRESSION:      1. Aorta is normal in size without evidence for intramural hematoma or   dissection.      2. 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    3. Interstitial pulmonary edema.    4. Mild mediastinal lymphadenopathy, likely reactive.    < end of copied text >    PHYSICAL EXAM:  GEN: No acute distress  HEENT: wide neck  LUNGS: Clear to auscultation bilaterally   HEART: S1/S2 present. RRR.   ABD: Soft, non-tender, non-distended. Bowel sounds present  EXT: no LE edema  NEURO: AAOX3  non focal

## 2019-12-03 NOTE — PROGRESS NOTE ADULT - ASSESSMENT
Patient is a 64y old  Male who presents with a chief complaint of Atrial fibrillation new onset (02 Dec 2019 14:18)      HTN (hypertension)  Chronic obstructive pulmonary disease (COPD)  CAD (coronary artery disease) s/p PCI  Diabetes  CHF (congestive heart failure)  AVR  New-onset a-fib=> Converted to sinus now    Recommendations:   Amiodarone  mg once daily for 1 week, then 200 mg daily  Obtain a baseline TSH and LFT  Continue AC   F/u vascular and cardiology Patient is a 64y old  Male who presents with a chief complaint of Atrial fibrillation new onset (02 Dec 2019 14:18)      HTN (hypertension)  Chronic obstructive pulmonary disease (COPD)  CAD (coronary artery disease) s/p PCI  Diabetes  CHF (congestive heart failure)  AVR  New-onset a-fib=> Converted to sinus now    Recommendations:   Amiodarone  mg twice daily for 1 week for 1 week, then 200 mg daily  Obtain a baseline TSH and LFT  Continue AC  if no contraindications  F/u vascular and cardiology

## 2019-12-04 LAB
ALBUMIN SERPL ELPH-MCNC: 3.9 G/DL — SIGNIFICANT CHANGE UP (ref 3.5–5.2)
ALBUMIN SERPL ELPH-MCNC: 4.1 G/DL — SIGNIFICANT CHANGE UP (ref 3.5–5.2)
ALP SERPL-CCNC: 37 U/L — SIGNIFICANT CHANGE UP (ref 30–115)
ALP SERPL-CCNC: 37 U/L — SIGNIFICANT CHANGE UP (ref 30–115)
ALT FLD-CCNC: 40 U/L — SIGNIFICANT CHANGE UP (ref 0–41)
ALT FLD-CCNC: 40 U/L — SIGNIFICANT CHANGE UP (ref 0–41)
ANION GAP SERPL CALC-SCNC: 16 MMOL/L — HIGH (ref 7–14)
APTT BLD: 52.6 SEC — HIGH (ref 27–39.2)
APTT BLD: 59.8 SEC — HIGH (ref 27–39.2)
APTT BLD: 62.6 SEC — HIGH (ref 27–39.2)
AST SERPL-CCNC: 31 U/L — SIGNIFICANT CHANGE UP (ref 0–41)
AST SERPL-CCNC: 32 U/L — SIGNIFICANT CHANGE UP (ref 0–41)
BASOPHILS # BLD AUTO: 0.06 K/UL — SIGNIFICANT CHANGE UP (ref 0–0.2)
BASOPHILS NFR BLD AUTO: 0.6 % — SIGNIFICANT CHANGE UP (ref 0–1)
BILIRUB DIRECT SERPL-MCNC: 0.3 MG/DL — HIGH (ref 0–0.2)
BILIRUB INDIRECT FLD-MCNC: 0.5 MG/DL — SIGNIFICANT CHANGE UP (ref 0.2–1.2)
BILIRUB SERPL-MCNC: 0.8 MG/DL — SIGNIFICANT CHANGE UP (ref 0.2–1.2)
BILIRUB SERPL-MCNC: 0.8 MG/DL — SIGNIFICANT CHANGE UP (ref 0.2–1.2)
BUN SERPL-MCNC: 13 MG/DL — SIGNIFICANT CHANGE UP (ref 10–20)
CALCIUM SERPL-MCNC: 9.2 MG/DL — SIGNIFICANT CHANGE UP (ref 8.5–10.1)
CHLORIDE SERPL-SCNC: 102 MMOL/L — SIGNIFICANT CHANGE UP (ref 98–110)
CHOLEST SERPL-MCNC: 114 MG/DL — SIGNIFICANT CHANGE UP (ref 100–200)
CK SERPL-CCNC: 287 U/L — HIGH (ref 0–225)
CK SERPL-CCNC: 292 U/L — HIGH (ref 0–225)
CO2 SERPL-SCNC: 20 MMOL/L — SIGNIFICANT CHANGE UP (ref 17–32)
CREAT SERPL-MCNC: 1.2 MG/DL — SIGNIFICANT CHANGE UP (ref 0.7–1.5)
EOSINOPHIL # BLD AUTO: 0.23 K/UL — SIGNIFICANT CHANGE UP (ref 0–0.7)
EOSINOPHIL NFR BLD AUTO: 2.4 % — SIGNIFICANT CHANGE UP (ref 0–8)
ESTIMATED AVERAGE GLUCOSE: 192 MG/DL — HIGH (ref 68–114)
GLUCOSE BLDC GLUCOMTR-MCNC: 180 MG/DL — HIGH (ref 70–99)
GLUCOSE BLDC GLUCOMTR-MCNC: 184 MG/DL — HIGH (ref 70–99)
GLUCOSE BLDC GLUCOMTR-MCNC: 195 MG/DL — HIGH (ref 70–99)
GLUCOSE BLDC GLUCOMTR-MCNC: 200 MG/DL — HIGH (ref 70–99)
GLUCOSE BLDC GLUCOMTR-MCNC: 207 MG/DL — HIGH (ref 70–99)
GLUCOSE SERPL-MCNC: 209 MG/DL — HIGH (ref 70–99)
HBA1C BLD-MCNC: 8.3 % — HIGH (ref 4–5.6)
HCT VFR BLD CALC: 44.2 % — SIGNIFICANT CHANGE UP (ref 42–52)
HDLC SERPL-MCNC: 25 MG/DL — LOW
HGB BLD-MCNC: 15.3 G/DL — SIGNIFICANT CHANGE UP (ref 14–18)
IMM GRANULOCYTES NFR BLD AUTO: 0.4 % — HIGH (ref 0.1–0.3)
INR BLD: 1.14 RATIO — SIGNIFICANT CHANGE UP (ref 0.65–1.3)
LIPID PNL WITH DIRECT LDL SERPL: 62 MG/DL — SIGNIFICANT CHANGE UP (ref 4–129)
LYMPHOCYTES # BLD AUTO: 1.61 K/UL — SIGNIFICANT CHANGE UP (ref 1.2–3.4)
LYMPHOCYTES # BLD AUTO: 17 % — LOW (ref 20.5–51.1)
MAGNESIUM SERPL-MCNC: 1.9 MG/DL — SIGNIFICANT CHANGE UP (ref 1.8–2.4)
MCHC RBC-ENTMCNC: 29.8 PG — SIGNIFICANT CHANGE UP (ref 27–31)
MCHC RBC-ENTMCNC: 34.6 G/DL — SIGNIFICANT CHANGE UP (ref 32–37)
MCV RBC AUTO: 86.2 FL — SIGNIFICANT CHANGE UP (ref 80–94)
MONOCYTES # BLD AUTO: 0.56 K/UL — SIGNIFICANT CHANGE UP (ref 0.1–0.6)
MONOCYTES NFR BLD AUTO: 5.9 % — SIGNIFICANT CHANGE UP (ref 1.7–9.3)
NEUTROPHILS # BLD AUTO: 6.95 K/UL — HIGH (ref 1.4–6.5)
NEUTROPHILS NFR BLD AUTO: 73.7 % — SIGNIFICANT CHANGE UP (ref 42.2–75.2)
NRBC # BLD: 0 /100 WBCS — SIGNIFICANT CHANGE UP (ref 0–0)
PHOSPHATE SERPL-MCNC: 2.5 MG/DL — SIGNIFICANT CHANGE UP (ref 2.1–4.9)
PLATELET # BLD AUTO: 177 K/UL — SIGNIFICANT CHANGE UP (ref 130–400)
POTASSIUM SERPL-MCNC: 4.3 MMOL/L — SIGNIFICANT CHANGE UP (ref 3.5–5)
POTASSIUM SERPL-SCNC: 4.3 MMOL/L — SIGNIFICANT CHANGE UP (ref 3.5–5)
PROT SERPL-MCNC: 6.3 G/DL — SIGNIFICANT CHANGE UP (ref 6–8)
PROT SERPL-MCNC: 6.4 G/DL — SIGNIFICANT CHANGE UP (ref 6–8)
PROTHROM AB SERPL-ACNC: 13.1 SEC — HIGH (ref 9.95–12.87)
RBC # BLD: 5.13 M/UL — SIGNIFICANT CHANGE UP (ref 4.7–6.1)
RBC # FLD: 12.9 % — SIGNIFICANT CHANGE UP (ref 11.5–14.5)
SODIUM SERPL-SCNC: 138 MMOL/L — SIGNIFICANT CHANGE UP (ref 135–146)
TOTAL CHOLESTEROL/HDL RATIO MEASUREMENT: 4.6 RATIO — SIGNIFICANT CHANGE UP (ref 4–5.5)
TRIGL SERPL-MCNC: 261 MG/DL — HIGH (ref 10–149)
TROPONIN T SERPL-MCNC: 0.65 NG/ML — CRITICAL HIGH
TSH SERPL-MCNC: 2.09 UIU/ML — SIGNIFICANT CHANGE UP (ref 0.27–4.2)
WBC # BLD: 9.45 K/UL — SIGNIFICANT CHANGE UP (ref 4.8–10.8)
WBC # FLD AUTO: 9.45 K/UL — SIGNIFICANT CHANGE UP (ref 4.8–10.8)

## 2019-12-04 PROCEDURE — 71045 X-RAY EXAM CHEST 1 VIEW: CPT | Mod: 26

## 2019-12-04 PROCEDURE — 93010 ELECTROCARDIOGRAM REPORT: CPT

## 2019-12-04 PROCEDURE — 99232 SBSQ HOSP IP/OBS MODERATE 35: CPT

## 2019-12-04 PROCEDURE — 99233 SBSQ HOSP IP/OBS HIGH 50: CPT

## 2019-12-04 RX ORDER — SODIUM CHLORIDE 9 MG/ML
1000 INJECTION, SOLUTION INTRAVENOUS
Refills: 0 | Status: DISCONTINUED | OUTPATIENT
Start: 2019-12-04 | End: 2019-12-04

## 2019-12-04 RX ORDER — APIXABAN 2.5 MG/1
1 TABLET, FILM COATED ORAL
Qty: 60 | Refills: 0
Start: 2019-12-04 | End: 2020-01-02

## 2019-12-04 RX ORDER — INSULIN GLARGINE 100 [IU]/ML
24 INJECTION, SOLUTION SUBCUTANEOUS AT BEDTIME
Refills: 0 | Status: DISCONTINUED | OUTPATIENT
Start: 2019-12-04 | End: 2019-12-04

## 2019-12-04 RX ORDER — INSULIN LISPRO 100/ML
10 VIAL (ML) SUBCUTANEOUS
Refills: 0 | Status: DISCONTINUED | OUTPATIENT
Start: 2019-12-04 | End: 2019-12-05

## 2019-12-04 RX ORDER — SODIUM CHLORIDE 9 MG/ML
1000 INJECTION, SOLUTION INTRAVENOUS
Refills: 0 | Status: DISCONTINUED | OUTPATIENT
Start: 2019-12-04 | End: 2019-12-05

## 2019-12-04 RX ORDER — DEXTROSE 50 % IN WATER 50 %
15 SYRINGE (ML) INTRAVENOUS ONCE
Refills: 0 | Status: DISCONTINUED | OUTPATIENT
Start: 2019-12-04 | End: 2019-12-05

## 2019-12-04 RX ORDER — DEXTROSE 50 % IN WATER 50 %
25 SYRINGE (ML) INTRAVENOUS ONCE
Refills: 0 | Status: DISCONTINUED | OUTPATIENT
Start: 2019-12-04 | End: 2019-12-05

## 2019-12-04 RX ORDER — DEXTROSE 50 % IN WATER 50 %
25 SYRINGE (ML) INTRAVENOUS ONCE
Refills: 0 | Status: DISCONTINUED | OUTPATIENT
Start: 2019-12-04 | End: 2019-12-04

## 2019-12-04 RX ORDER — DEXTROSE 50 % IN WATER 50 %
12.5 SYRINGE (ML) INTRAVENOUS ONCE
Refills: 0 | Status: DISCONTINUED | OUTPATIENT
Start: 2019-12-04 | End: 2019-12-04

## 2019-12-04 RX ORDER — INSULIN GLARGINE 100 [IU]/ML
28 INJECTION, SOLUTION SUBCUTANEOUS AT BEDTIME
Refills: 0 | Status: DISCONTINUED | OUTPATIENT
Start: 2019-12-04 | End: 2019-12-05

## 2019-12-04 RX ORDER — INSULIN LISPRO 100/ML
VIAL (ML) SUBCUTANEOUS
Refills: 0 | Status: DISCONTINUED | OUTPATIENT
Start: 2019-12-04 | End: 2019-12-05

## 2019-12-04 RX ORDER — GLUCAGON INJECTION, SOLUTION 0.5 MG/.1ML
1 INJECTION, SOLUTION SUBCUTANEOUS ONCE
Refills: 0 | Status: DISCONTINUED | OUTPATIENT
Start: 2019-12-04 | End: 2019-12-05

## 2019-12-04 RX ORDER — INSULIN LISPRO 100/ML
VIAL (ML) SUBCUTANEOUS
Refills: 0 | Status: DISCONTINUED | OUTPATIENT
Start: 2019-12-04 | End: 2019-12-04

## 2019-12-04 RX ORDER — DEXTROSE 50 % IN WATER 50 %
12.5 SYRINGE (ML) INTRAVENOUS ONCE
Refills: 0 | Status: DISCONTINUED | OUTPATIENT
Start: 2019-12-04 | End: 2019-12-05

## 2019-12-04 RX ORDER — DEXTROSE 50 % IN WATER 50 %
15 SYRINGE (ML) INTRAVENOUS ONCE
Refills: 0 | Status: DISCONTINUED | OUTPATIENT
Start: 2019-12-04 | End: 2019-12-04

## 2019-12-04 RX ORDER — GLUCAGON INJECTION, SOLUTION 0.5 MG/.1ML
1 INJECTION, SOLUTION SUBCUTANEOUS ONCE
Refills: 0 | Status: DISCONTINUED | OUTPATIENT
Start: 2019-12-04 | End: 2019-12-04

## 2019-12-04 RX ORDER — SACUBITRIL AND VALSARTAN 24; 26 MG/1; MG/1
1 TABLET, FILM COATED ORAL
Refills: 0 | Status: DISCONTINUED | OUTPATIENT
Start: 2019-12-04 | End: 2019-12-05

## 2019-12-04 RX ORDER — AMIODARONE HYDROCHLORIDE 400 MG/1
200 TABLET ORAL DAILY
Refills: 0 | Status: CANCELLED | OUTPATIENT
Start: 2019-12-11 | End: 2019-12-05

## 2019-12-04 RX ORDER — INSULIN LISPRO 100/ML
8 VIAL (ML) SUBCUTANEOUS
Refills: 0 | Status: DISCONTINUED | OUTPATIENT
Start: 2019-12-04 | End: 2019-12-04

## 2019-12-04 RX ORDER — AMIODARONE HYDROCHLORIDE 400 MG/1
200 TABLET ORAL
Refills: 0 | Status: DISCONTINUED | OUTPATIENT
Start: 2019-12-04 | End: 2019-12-05

## 2019-12-04 RX ORDER — CHLORHEXIDINE GLUCONATE 213 G/1000ML
1 SOLUTION TOPICAL
Refills: 0 | Status: DISCONTINUED | OUTPATIENT
Start: 2019-12-04 | End: 2019-12-05

## 2019-12-04 RX ADMIN — Medication 8 UNIT(S): at 09:09

## 2019-12-04 RX ADMIN — Medication 1: at 17:08

## 2019-12-04 RX ADMIN — AMIODARONE HYDROCHLORIDE 400 MILLIGRAM(S): 400 TABLET ORAL at 05:50

## 2019-12-04 RX ADMIN — Medication 81 MILLIGRAM(S): at 12:13

## 2019-12-04 RX ADMIN — Medication 145 MILLIGRAM(S): at 12:13

## 2019-12-04 RX ADMIN — Medication 2: at 12:10

## 2019-12-04 RX ADMIN — HEPARIN SODIUM 19 UNIT(S)/HR: 5000 INJECTION INTRAVENOUS; SUBCUTANEOUS at 01:05

## 2019-12-04 RX ADMIN — CLOPIDOGREL BISULFATE 75 MILLIGRAM(S): 75 TABLET, FILM COATED ORAL at 12:13

## 2019-12-04 RX ADMIN — HEPARIN SODIUM 20 UNIT(S)/HR: 5000 INJECTION INTRAVENOUS; SUBCUTANEOUS at 07:16

## 2019-12-04 RX ADMIN — AMIODARONE HYDROCHLORIDE 200 MILLIGRAM(S): 400 TABLET ORAL at 17:03

## 2019-12-04 RX ADMIN — Medication 8 UNIT(S): at 12:12

## 2019-12-04 RX ADMIN — INSULIN GLARGINE 28 UNIT(S): 100 INJECTION, SOLUTION SUBCUTANEOUS at 21:48

## 2019-12-04 RX ADMIN — Medication 10 UNIT(S): at 17:04

## 2019-12-04 RX ADMIN — ZOLPIDEM TARTRATE 5 MILLIGRAM(S): 10 TABLET ORAL at 21:49

## 2019-12-04 RX ADMIN — BUDESONIDE AND FORMOTEROL FUMARATE DIHYDRATE 2 PUFF(S): 160; 4.5 AEROSOL RESPIRATORY (INHALATION) at 07:46

## 2019-12-04 RX ADMIN — ATORVASTATIN CALCIUM 40 MILLIGRAM(S): 80 TABLET, FILM COATED ORAL at 21:48

## 2019-12-04 RX ADMIN — BUDESONIDE AND FORMOTEROL FUMARATE DIHYDRATE 2 PUFF(S): 160; 4.5 AEROSOL RESPIRATORY (INHALATION) at 21:49

## 2019-12-04 RX ADMIN — SACUBITRIL AND VALSARTAN 1 TABLET(S): 24; 26 TABLET, FILM COATED ORAL at 17:02

## 2019-12-04 NOTE — PROGRESS NOTE ADULT - ASSESSMENT
64 year old male  known to have COPD not on home O2, CAD w/ stent (recent in Oct), HFrEF, HTN, DM II, DLD presented to the ED for chest pain. Patient is a current daily smoker of 3/4 of pack daily for > 20 years, last cigarette was on day prior to presentation. In the ED, Pt was found to be in new onset AFIB with RVR. he was given cardizem 5 mg iv push x 2 times. His BP then dropped to 80s systolic. He was given 1 L IVF which helped improve BP to 100s systolic but then again remained in 90s systolic.  Another 500 bolus given  Pt was seen by cardiology and recommended amiodarone bolus and infusion.    # New onset Afib- now in NSR  - C/w amiodarone  - IV heparin  - monitor PTT  - TSH    # H/o CAD s/p stent, chronic  Systolic CHF, aortic stenosis  - Transthoracic Echocardiogram (12.03.19 @ 10:38) >Left ventricular ejection fraction, by visual estimation, is 30 to 35%.Multiple left ventricular regional wall motion abnormalities exist.Grade II diastolic dysfunction Moderate aortic valve stenosis.  - c/w ASA, plavix, statin  - c/w entresto  - planned for AICD    # DM type 2  - monitor FS  - Hemoglobin A1C, Whole Blood: 8.3 % (12.04.19 @ 04:30)  - increase lantus, lispro    # Dyslipidemia  - c/w fenofibrate, statin    # COPD  - c/w albuterol, budesonide    # CKD stage 2 stable    # full code    # Pending: clinical improvement  # Discussed with patient: plan of care  # Disposition: home when stable

## 2019-12-04 NOTE — PROGRESS NOTE ADULT - SUBJECTIVE AND OBJECTIVE BOX
Patient is a 64y old  Male who presents with a chief complaint of Atrial fibrillation new onset (04 Dec 2019 08:12)    Patient was seen and examined.  Planned for AICD  Denies chest pain, sob, palpitations  Denies any other complaints.  All systems reviewed     PAST MEDICAL & SURGICAL HISTORY:  Dyslipidemia  Stented coronary artery  HTN (hypertension)  Chronic obstructive pulmonary disease (COPD)  CAD (coronary artery disease)  Diabetes  CHF (congestive heart failure)  Heart valve replaced  H/O heart artery stent    Allergies  sulfa drugs (Unknown)    MEDICATIONS  (STANDING):  aMIOdarone    Tablet 200 milliGRAM(s) Oral two times a day  aspirin enteric coated 81 milliGRAM(s) Oral daily  atorvastatin 40 milliGRAM(s) Oral at bedtime  budesonide 160 MICROgram(s)/formoterol 4.5 MICROgram(s) Inhaler 2 Puff(s) Inhalation two times a day  clopidogrel Tablet 75 milliGRAM(s) Oral daily  dextrose 5%. 1000 milliLiter(s) (50 mL/Hr) IV Continuous <Continuous>  dextrose 50% Injectable 12.5 Gram(s) IV Push once  dextrose 50% Injectable 25 Gram(s) IV Push once  dextrose 50% Injectable 25 Gram(s) IV Push once  fenofibrate Tablet 145 milliGRAM(s) Oral daily  heparin  Infusion 1900 Unit(s)/Hr (20 mL/Hr) IV Continuous <Continuous>  insulin glargine Injectable (LANTUS) 24 Unit(s) SubCutaneous at bedtime  insulin lispro (HumaLOG) corrective regimen sliding scale   SubCutaneous three times a day before meals  insulin lispro Injectable (HumaLOG) 8 Unit(s) SubCutaneous three times a day before meals  sacubitril 24 mG/valsartan 26 mG 1 Tablet(s) Oral two times a day  zolpidem 5 milliGRAM(s) Oral at bedtime    MEDICATIONS  (PRN):  dextrose 40% Gel 15 Gram(s) Oral once PRN Blood Glucose LESS THAN 70 milliGRAM(s)/deciliter  glucagon  Injectable 1 milliGRAM(s) IntraMuscular once PRN Glucose LESS THAN 70 milligrams/deciliter    Vital Signs Last 24 Hrs  T(C): 36.8  T(F): 98.2  HR: 88  BP: 154/78  BP(mean): 108  RR: 34  SpO2: 86%    O/E:  Awake, alert, not in distress.  HEENT: atraumatic, EOMI.  Chest: clear.  CVS: SIS2 +, no murmur.  P/A: Soft, BS+  CNS: non focal.  Ext: no edema feet.  Skin: no rash, no ulcers.  All systems reviewed positive findings as above.      POCT Blood Glucose.: 207 mg/dL (04 Dec 2019 11:57)  POCT Blood Glucose.: 184 mg/dL (04 Dec 2019 08:26)  POCT Blood Glucose.: 180 mg/dL (04 Dec 2019 03:10)                          15.3   9.45  )-----------( 177      ( 04 Dec 2019 04:30 )             44.2                         15.7   11.44<H> )-----------( 191      ( 03 Dec 2019 06:09 )             46.2     12-04    138  |  102  |  13  ----------------------------<  209<H>  4.3   |  20  |  1.2  12-03    139  |  101  |  17  ----------------------------<  176<H>  3.8   |  21  |  1.2    Ca    9.2      04 Dec 2019 04:30  Ca    9.1      03 Dec 2019 06:09  Phos  2.5     12-04  Mg     1.9     12-04    TPro  6.3  /  Alb  4.1  /  TBili  0.8  /  DBili  0.3<H>  /  AST  31  /  ALT  40  /  AlkPhos  37  12-04  TPro  6.3  /  Alb  4.1  /  TBili  0.6  /  DBili  x   /  AST  57<H>  /  ALT  49<H>  /  AlkPhos  37  12-03      CARDIAC MARKERS ( 04 Dec 2019 04:30 )  x     / 0.65 ng/mL<HH> / 292 U/L<H> / x     / x      CARDIAC MARKERS ( 03 Dec 2019 17:20 )  x     / 0.43 ng/mL<HH> / 319 U/L<H> / x     / 11.7 ng/mL<H>  CARDIAC MARKERS ( 02 Dec 2019 17:16 )  x     / 0.61 ng/mL<HH> / x     / x     / x          PTT - ( 04 Dec 2019 04:30 )  PTT:52.6 sec

## 2019-12-04 NOTE — PROGRESS NOTE ADULT - SUBJECTIVE AND OBJECTIVE BOX
Denies any chest pain, SOB or palpitations. He is currently in sinus rhythm.       MEDICATIONS  (STANDING):  aMIOdarone    Tablet 400 milliGRAM(s) Oral every 12 hours  aMIOdarone    Tablet   Oral   aMIOdarone Infusion 0.5 mG/Min (16.667 mL/Hr) IV Continuous <Continuous>  aspirin enteric coated 81 milliGRAM(s) Oral daily  atorvastatin 40 milliGRAM(s) Oral at bedtime  budesonide 160 MICROgram(s)/formoterol 4.5 MICROgram(s) Inhaler 2 Puff(s) Inhalation two times a day  clopidogrel Tablet 75 milliGRAM(s) Oral daily  fenofibrate Tablet 145 milliGRAM(s) Oral daily  heparin  Infusion 1900 Unit(s)/Hr (20 mL/Hr) IV Continuous <Continuous>  zolpidem 5 milliGRAM(s) Oral at bedtime    MEDICATIONS  (PRN):            Vital Signs Last 24 Hrs  T(C): 36.6 (04 Dec 2019 03:15), Max: 36.6 (04 Dec 2019 03:15)  T(F): 97.9 (04 Dec 2019 03:15), Max: 97.9 (04 Dec 2019 03:15)  HR: 90 (04 Dec 2019 06:00) (81 - 97)  BP: 149/57 (04 Dec 2019 06:00) (139/67 - 189/85)  BP(mean): 92 (04 Dec 2019 06:00) (92 - 126)  RR: 24 (04 Dec 2019 06:00) (16 - 24)  SpO2: 92% (04 Dec 2019 06:00) (92% - 100%)     REVIEW OF SYSTEMS:  CONSTITUTIONAL: No fever, weight loss, or fatigue  CARDIOLOGY: Patient denies chest pain, shortness of breath or syncopal episodes.   RESPIRATORY: denies shortness of breath, wheezeing.   NEUROLOGICAL: NO weakness, no focal deficits to report.  GI: no BRBPR, no N,V,diarrhea.    PSYCHIATRY: normal mood and affect  HEENT: no nasal discharge, no ecchymosis  SKIN: no ecchymosis, no breakdown  MUSCULOSKELETAL: Full range of motion x4.        PHYSICAL EXAM:  · CONSTITUTIONAL:	Well-developed, well nourished    BMI-  ·RESPIRATORY:   airway patent; breath sounds equal; good air movement   · CARDIOVASCULAR	regular rate and rhythm  + MEGHAN  · EXTREMITIES: No cyanosis, clubbing or edema  · VASCULAR: 	Absent PT   	  TELEMETRY: Sinus       TTE: Severe LV dysfunction     LABS:                        15.3   9.45  )-----------( 177      ( 04 Dec 2019 04:30 )             44.2     12-04    138  |  102  |  13  ----------------------------<  209<H>  4.3   |  20  |  1.2    Ca    9.2      04 Dec 2019 04:30  Phos  2.5     12-04  Mg     1.9     12-04    TPro  6.3  /  Alb  4.1  /  TBili  0.8  /  DBili  0.3<H>  /  AST  31  /  ALT  40  /  AlkPhos  37  12-04    CARDIAC MARKERS ( 04 Dec 2019 04:30 )  x     / 0.65 ng/mL / 292 U/L / x     / x      CARDIAC MARKERS ( 03 Dec 2019 17:20 )  x     / 0.43 ng/mL / 319 U/L / x     / 11.7 ng/mL  CARDIAC MARKERS ( 02 Dec 2019 17:16 )  x     / 0.61 ng/mL / x     / x     / x      CARDIAC MARKERS ( 02 Dec 2019 11:43 )  x     / 0.47 ng/mL / 596 U/L / x     / 81.4 ng/mL      PTT - ( 04 Dec 2019 04:30 )  PTT:52.6 sec    I&O's Summary    03 Dec 2019 07:01  -  04 Dec 2019 07:00  --------------------------------------------------------  IN: 1304.5 mL / OUT: 1880 mL / NET: -575.5 mL      BNP  RADIOLOGY & ADDITIONAL STUDIES:    IMPRESSION AND PLAN:        DAPT  Keep equal balance  Will review cath images  IV heparin for now   EP F/U

## 2019-12-04 NOTE — PROGRESS NOTE ADULT - SUBJECTIVE AND OBJECTIVE BOX
Patient is a 64y old  Male who presents with a chief complaint of Atrial fibrillation new onset (04 Dec 2019 12:44)    ICU Vital Signs Last 24 Hrs  T(C): 36.8 (04 Dec 2019 12:00), Max: 36.8 (04 Dec 2019 12:00)  T(F): 98.2 (04 Dec 2019 12:00), Max: 98.2 (04 Dec 2019 12:00)  HR: 88 (04 Dec 2019 12:00) (81 - 97)  BP: 154/78 (04 Dec 2019 12:00) (139/67 - 189/85)  BP(mean): 108 (04 Dec 2019 12:00) (92 - 133)  RR: 34 (04 Dec 2019 12:00) (16 - 34)  SpO2: 86% (04 Dec 2019 12:00) (86% - 100%)    I&O's Summary    03 Dec 2019 07:01  -  04 Dec 2019 07:00  --------------------------------------------------------  IN: 1304.5 mL / OUT: 1880 mL / NET: -575.5 mL    04 Dec 2019 07:01  -  04 Dec 2019 13:00  --------------------------------------------------------  IN: 1200 mL / OUT: 400 mL / NET: 800 mL      LABS:                        15.3   9.45  )-----------( 177      ( 04 Dec 2019 04:30 )             44.2     12-04    138  |  102  |  13  ----------------------------<  209<H>  4.3   |  20  |  1.2    Ca    9.2      04 Dec 2019 04:30  Phos  2.5     12-04  Mg     1.9     12-04    TPro  6.3  /  Alb  4.1  /  TBili  0.8  /  DBili  0.3<H>  /  AST  31  /  ALT  40  /  AlkPhos  37  12-04    PTT - ( 04 Dec 2019 04:30 )  PTT:52.6 sec    CAPILLARY BLOOD GLUCOSE      POCT Blood Glucose.: 207 mg/dL (04 Dec 2019 11:57)  POCT Blood Glucose.: 184 mg/dL (04 Dec 2019 08:26)  POCT Blood Glucose.: 180 mg/dL (04 Dec 2019 03:10)        RADIOLOGY & ADDITIONAL TESTS:    Consultant(s) Notes Reviewed:  [x ] YES  [ ] NO    MEDICATIONS  (STANDING):  aMIOdarone    Tablet 200 milliGRAM(s) Oral two times a day  aspirin enteric coated 81 milliGRAM(s) Oral daily  atorvastatin 40 milliGRAM(s) Oral at bedtime  budesonide 160 MICROgram(s)/formoterol 4.5 MICROgram(s) Inhaler 2 Puff(s) Inhalation two times a day  clopidogrel Tablet 75 milliGRAM(s) Oral daily  dextrose 5%. 1000 milliLiter(s) (50 mL/Hr) IV Continuous <Continuous>  dextrose 50% Injectable 12.5 Gram(s) IV Push once  dextrose 50% Injectable 25 Gram(s) IV Push once  dextrose 50% Injectable 25 Gram(s) IV Push once  fenofibrate Tablet 145 milliGRAM(s) Oral daily  heparin  Infusion 1900 Unit(s)/Hr (20 mL/Hr) IV Continuous <Continuous>  insulin glargine Injectable (LANTUS) 24 Unit(s) SubCutaneous at bedtime  insulin lispro (HumaLOG) corrective regimen sliding scale   SubCutaneous three times a day before meals  insulin lispro Injectable (HumaLOG) 8 Unit(s) SubCutaneous three times a day before meals  sacubitril 24 mG/valsartan 26 mG 1 Tablet(s) Oral two times a day  zolpidem 5 milliGRAM(s) Oral at bedtime    MEDICATIONS  (PRN):  dextrose 40% Gel 15 Gram(s) Oral once PRN Blood Glucose LESS THAN 70 milliGRAM(s)/deciliter  glucagon  Injectable 1 milliGRAM(s) IntraMuscular once PRN Glucose LESS THAN 70 milligrams/deciliter    PHYSICAL EXAM:   CONSTITUTIONAL: Well-developed; well-nourished; in no acute distress.   SKIN: warm, dry  HEAD: Normocephalic; atraumatic.  EYES: no conjunctival injection. EOMI.   ENT: No nasal discharge; airway clear.  NECK: Supple; non tender.  CARD: S1, S2 normal; no murmurs, gallops, or rubs. RRR  RESP: No wheezes, rales or rhonchi.  ABD: soft ntnd.   EXT: No LE edema.   NEURO: Alert, oriented, grossly unremarkable.      ASSESSMENT & PLAN    63 y/o M PMHx COPD not on home O2, CAD w/ stent (recent in Oct), HFrEF, HTN, DM II, DLD presented to the ED for chest pain. Patient is a current daily smoker of 3/4 of pack daily for > 20 years, last cigarette was on day prior to presentation. In the ED, Pt was found to be in new onset AFIB with RVR. he was given cardizem 5 mg iv push x 2 times. His BP then dropped to 80s systolic. He was given 1 L IVF which helped improve BP to 100s systolic but then again remained in 90s systolic.  Another 500 bolus given  Pt was seen by cardiology and recommended amiodarone bolus and infusion.    #New onset Afib  - amiodarone drip -> PO   - heparin drip   - f/u PTT  - f/u TSH and LFT   - f/u EP - recommending amiodarone PO 200mg BID for 1 week -> 200mg qd     #H/o CAD s/p stent, chronic  Systolic CHF, aortic stenosis  - ECHO 12/3/19 - EF 30-35%  - c/w ASA, plavix, statin  - c/w Entresto  - planned for AICD    #?iliac dissection  - f/u vascular recs     #DM2  - monitor FS  - increase insulin     #Dyslipidemia  - c/w fenofibrate, statin    #COPD  - c/w albuterol, budesonide    # CKD stage 2 stable    # full code  # Disposition: home when stable Patient is a 64y old  Male who presents with a chief complaint of Atrial fibrillation new onset (04 Dec 2019 12:44)    ICU Vital Signs Last 24 Hrs  T(C): 36.8 (04 Dec 2019 12:00), Max: 36.8 (04 Dec 2019 12:00)  T(F): 98.2 (04 Dec 2019 12:00), Max: 98.2 (04 Dec 2019 12:00)  HR: 88 (04 Dec 2019 12:00) (81 - 97)  BP: 154/78 (04 Dec 2019 12:00) (139/67 - 189/85)  BP(mean): 108 (04 Dec 2019 12:00) (92 - 133)  RR: 34 (04 Dec 2019 12:00) (16 - 34)  SpO2: 86% (04 Dec 2019 12:00) (86% - 100%)    I&O's Summary    03 Dec 2019 07:01  -  04 Dec 2019 07:00  --------------------------------------------------------  IN: 1304.5 mL / OUT: 1880 mL / NET: -575.5 mL    04 Dec 2019 07:01  -  04 Dec 2019 13:00  --------------------------------------------------------  IN: 1200 mL / OUT: 400 mL / NET: 800 mL      LABS:                        15.3   9.45  )-----------( 177      ( 04 Dec 2019 04:30 )             44.2     12-04    138  |  102  |  13  ----------------------------<  209<H>  4.3   |  20  |  1.2    Ca    9.2      04 Dec 2019 04:30  Phos  2.5     12-04  Mg     1.9     12-04    TPro  6.3  /  Alb  4.1  /  TBili  0.8  /  DBili  0.3<H>  /  AST  31  /  ALT  40  /  AlkPhos  37  12-04    PTT - ( 04 Dec 2019 04:30 )  PTT:52.6 sec    CAPILLARY BLOOD GLUCOSE      POCT Blood Glucose.: 207 mg/dL (04 Dec 2019 11:57)  POCT Blood Glucose.: 184 mg/dL (04 Dec 2019 08:26)  POCT Blood Glucose.: 180 mg/dL (04 Dec 2019 03:10)        RADIOLOGY & ADDITIONAL TESTS:    Consultant(s) Notes Reviewed:  [x ] YES  [ ] NO    MEDICATIONS  (STANDING):  aMIOdarone    Tablet 200 milliGRAM(s) Oral two times a day  aspirin enteric coated 81 milliGRAM(s) Oral daily  atorvastatin 40 milliGRAM(s) Oral at bedtime  budesonide 160 MICROgram(s)/formoterol 4.5 MICROgram(s) Inhaler 2 Puff(s) Inhalation two times a day  clopidogrel Tablet 75 milliGRAM(s) Oral daily  dextrose 5%. 1000 milliLiter(s) (50 mL/Hr) IV Continuous <Continuous>  dextrose 50% Injectable 12.5 Gram(s) IV Push once  dextrose 50% Injectable 25 Gram(s) IV Push once  dextrose 50% Injectable 25 Gram(s) IV Push once  fenofibrate Tablet 145 milliGRAM(s) Oral daily  heparin  Infusion 1900 Unit(s)/Hr (20 mL/Hr) IV Continuous <Continuous>  insulin glargine Injectable (LANTUS) 24 Unit(s) SubCutaneous at bedtime  insulin lispro (HumaLOG) corrective regimen sliding scale   SubCutaneous three times a day before meals  insulin lispro Injectable (HumaLOG) 8 Unit(s) SubCutaneous three times a day before meals  sacubitril 24 mG/valsartan 26 mG 1 Tablet(s) Oral two times a day  zolpidem 5 milliGRAM(s) Oral at bedtime    MEDICATIONS  (PRN):  dextrose 40% Gel 15 Gram(s) Oral once PRN Blood Glucose LESS THAN 70 milliGRAM(s)/deciliter  glucagon  Injectable 1 milliGRAM(s) IntraMuscular once PRN Glucose LESS THAN 70 milligrams/deciliter    PHYSICAL EXAM:   CONSTITUTIONAL: Well-developed; well-nourished; in no acute distress.   SKIN: warm, dry  HEAD: Normocephalic; atraumatic.  EYES: no conjunctival injection. EOMI.   ENT: No nasal discharge; airway clear.  NECK: Supple; non tender.  CARD: S1, S2 normal; no murmurs, gallops, or rubs. RRR  RESP: No wheezes, rales or rhonchi.  ABD: soft ntnd.   EXT: No LE edema.   NEURO: Alert, oriented, grossly unremarkable.      ASSESSMENT & PLAN    63 y/o M PMHx COPD not on home O2, CAD w/ stent (recent in Oct), HFrEF, HTN, DM II, DLD presented to the ED for chest pain. Patient is a current daily smoker of 3/4 of pack daily for > 20 years, last cigarette was on day prior to presentation. In the ED, Pt was found to be in new onset AFIB with RVR. he was given cardizem 5 mg iv push x 2 times. His BP then dropped to 80s systolic. He was given 1 L IVF which helped improve BP to 100s systolic but then again remained in 90s systolic.  Another 500 bolus given  Pt was seen by cardiology and recommended amiodarone bolus and infusion.    #New onset Afib  - amiodarone drip -> PO   - heparin drip   - f/u PTT  - f/u TSH and LFT   - f/u EP - recommending amiodarone PO 200mg BID for 1 week -> 200mg qd     #H/o CAD s/p stent, chronic  Systolic CHF, aortic stenosis  - ECHO 12/3/19 - EF 30-35%  - c/w ASA, plavix, statin  - c/w Entresto  - planned for AICD/biventricular pacemaker- spoke with cardiologist Dr. Lamar, Dr. Fall on board   - consult for CT surg placed     #?iliac dissection  - f/u vascular recs     #DM2  - monitor FS  - increase insulin     #Dyslipidemia  - c/w fenofibrate, statin    #COPD  - c/w albuterol, budesonide    # CKD stage 2 stable    # full code  # Disposition: home when stable

## 2019-12-04 NOTE — PROGRESS NOTE ADULT - SUBJECTIVE AND OBJECTIVE BOX
Events noted. Pt known to me from recent office visits. Recent staged PTCI 10,2019 by Dr. Rosado of [facility] and Rt coronary arteries.Bioprosthetic AVR 2002. Chronic LBBB. EF30% on echo 9/2019 and 12/2019. Pt wit rapid afib and chf and now on po amiodorone with return of RSR and CHF resolving. Pt would benefit from entresto and pt off his outpt valsartan. I discussed ptwith EP Dr. Fall and will get consult with CT surgery Dr. Li for AICD/ Biventricular PPM. Heparin van be changed to po xarelto or eliquis after proceedure completed and possibly d/c ASA when xarelto/eliquis added.

## 2019-12-05 ENCOUNTER — TRANSCRIPTION ENCOUNTER (OUTPATIENT)
Age: 64
End: 2019-12-05

## 2019-12-05 VITALS — RESPIRATION RATE: 20 BRPM | HEART RATE: 82 BPM

## 2019-12-05 LAB
ALBUMIN SERPL ELPH-MCNC: 4.1 G/DL — SIGNIFICANT CHANGE UP (ref 3.5–5.2)
ALP SERPL-CCNC: 39 U/L — SIGNIFICANT CHANGE UP (ref 30–115)
ALT FLD-CCNC: 44 U/L — HIGH (ref 0–41)
ANION GAP SERPL CALC-SCNC: 18 MMOL/L — HIGH (ref 7–14)
APTT BLD: 67.4 SEC — HIGH (ref 27–39.2)
AST SERPL-CCNC: 35 U/L — SIGNIFICANT CHANGE UP (ref 0–41)
BASOPHILS # BLD AUTO: 0.08 K/UL — SIGNIFICANT CHANGE UP (ref 0–0.2)
BASOPHILS NFR BLD AUTO: 0.9 % — SIGNIFICANT CHANGE UP (ref 0–1)
BILIRUB DIRECT SERPL-MCNC: 0.2 MG/DL — SIGNIFICANT CHANGE UP (ref 0–0.2)
BILIRUB INDIRECT FLD-MCNC: 0.6 MG/DL — SIGNIFICANT CHANGE UP (ref 0.2–1.2)
BILIRUB SERPL-MCNC: 0.8 MG/DL — SIGNIFICANT CHANGE UP (ref 0.2–1.2)
BUN SERPL-MCNC: 14 MG/DL — SIGNIFICANT CHANGE UP (ref 10–20)
CALCIUM SERPL-MCNC: 9.2 MG/DL — SIGNIFICANT CHANGE UP (ref 8.5–10.1)
CHLORIDE SERPL-SCNC: 103 MMOL/L — SIGNIFICANT CHANGE UP (ref 98–110)
CO2 SERPL-SCNC: 20 MMOL/L — SIGNIFICANT CHANGE UP (ref 17–32)
CREAT SERPL-MCNC: 1.3 MG/DL — SIGNIFICANT CHANGE UP (ref 0.7–1.5)
EOSINOPHIL # BLD AUTO: 0.3 K/UL — SIGNIFICANT CHANGE UP (ref 0–0.7)
EOSINOPHIL NFR BLD AUTO: 3.2 % — SIGNIFICANT CHANGE UP (ref 0–8)
GLUCOSE BLDC GLUCOMTR-MCNC: 170 MG/DL — HIGH (ref 70–99)
GLUCOSE BLDC GLUCOMTR-MCNC: 273 MG/DL — HIGH (ref 70–99)
GLUCOSE SERPL-MCNC: 185 MG/DL — HIGH (ref 70–99)
HCT VFR BLD CALC: 45.1 % — SIGNIFICANT CHANGE UP (ref 42–52)
HGB BLD-MCNC: 15 G/DL — SIGNIFICANT CHANGE UP (ref 14–18)
IMM GRANULOCYTES NFR BLD AUTO: 0.5 % — HIGH (ref 0.1–0.3)
INR BLD: 1.14 RATIO — SIGNIFICANT CHANGE UP (ref 0.65–1.3)
LYMPHOCYTES # BLD AUTO: 1.9 K/UL — SIGNIFICANT CHANGE UP (ref 1.2–3.4)
LYMPHOCYTES # BLD AUTO: 20.2 % — LOW (ref 20.5–51.1)
MAGNESIUM SERPL-MCNC: 2.1 MG/DL — SIGNIFICANT CHANGE UP (ref 1.8–2.4)
MCHC RBC-ENTMCNC: 29.4 PG — SIGNIFICANT CHANGE UP (ref 27–31)
MCHC RBC-ENTMCNC: 33.3 G/DL — SIGNIFICANT CHANGE UP (ref 32–37)
MCV RBC AUTO: 88.4 FL — SIGNIFICANT CHANGE UP (ref 80–94)
MONOCYTES # BLD AUTO: 0.6 K/UL — SIGNIFICANT CHANGE UP (ref 0.1–0.6)
MONOCYTES NFR BLD AUTO: 6.4 % — SIGNIFICANT CHANGE UP (ref 1.7–9.3)
NEUTROPHILS # BLD AUTO: 6.48 K/UL — SIGNIFICANT CHANGE UP (ref 1.4–6.5)
NEUTROPHILS NFR BLD AUTO: 68.8 % — SIGNIFICANT CHANGE UP (ref 42.2–75.2)
NRBC # BLD: 0 /100 WBCS — SIGNIFICANT CHANGE UP (ref 0–0)
PHOSPHATE SERPL-MCNC: 2.9 MG/DL — SIGNIFICANT CHANGE UP (ref 2.1–4.9)
PLATELET # BLD AUTO: 190 K/UL — SIGNIFICANT CHANGE UP (ref 130–400)
POTASSIUM SERPL-MCNC: 4.3 MMOL/L — SIGNIFICANT CHANGE UP (ref 3.5–5)
POTASSIUM SERPL-SCNC: 4.3 MMOL/L — SIGNIFICANT CHANGE UP (ref 3.5–5)
PROT SERPL-MCNC: 6.5 G/DL — SIGNIFICANT CHANGE UP (ref 6–8)
PROTHROM AB SERPL-ACNC: 13.1 SEC — HIGH (ref 9.95–12.87)
RBC # BLD: 5.1 M/UL — SIGNIFICANT CHANGE UP (ref 4.7–6.1)
RBC # FLD: 13 % — SIGNIFICANT CHANGE UP (ref 11.5–14.5)
SODIUM SERPL-SCNC: 141 MMOL/L — SIGNIFICANT CHANGE UP (ref 135–146)
TROPONIN T SERPL-MCNC: 0.95 NG/ML — CRITICAL HIGH
WBC # BLD: 9.41 K/UL — SIGNIFICANT CHANGE UP (ref 4.8–10.8)
WBC # FLD AUTO: 9.41 K/UL — SIGNIFICANT CHANGE UP (ref 4.8–10.8)

## 2019-12-05 PROCEDURE — 93010 ELECTROCARDIOGRAM REPORT: CPT

## 2019-12-05 PROCEDURE — 99232 SBSQ HOSP IP/OBS MODERATE 35: CPT

## 2019-12-05 PROCEDURE — 99233 SBSQ HOSP IP/OBS HIGH 50: CPT

## 2019-12-05 PROCEDURE — 99222 1ST HOSP IP/OBS MODERATE 55: CPT

## 2019-12-05 PROCEDURE — 71045 X-RAY EXAM CHEST 1 VIEW: CPT | Mod: 26

## 2019-12-05 RX ORDER — AMIODARONE HYDROCHLORIDE 400 MG/1
1 TABLET ORAL
Qty: 0 | Refills: 0 | DISCHARGE
Start: 2019-12-05 | End: 2020-01-03

## 2019-12-05 RX ORDER — APIXABAN 2.5 MG/1
1 TABLET, FILM COATED ORAL
Qty: 60 | Refills: 1
Start: 2019-12-05 | End: 2020-02-02

## 2019-12-05 RX ORDER — IRBESARTAN 75 MG/1
1 TABLET ORAL
Qty: 0 | Refills: 0 | DISCHARGE

## 2019-12-05 RX ORDER — SACUBITRIL AND VALSARTAN 24; 26 MG/1; MG/1
1 TABLET, FILM COATED ORAL
Qty: 60 | Refills: 1
Start: 2019-12-05 | End: 2020-02-02

## 2019-12-05 RX ORDER — AMIODARONE HYDROCHLORIDE 400 MG/1
1 TABLET ORAL
Qty: 60 | Refills: 0
Start: 2019-12-05 | End: 2020-01-03

## 2019-12-05 RX ADMIN — Medication 3: at 12:34

## 2019-12-05 RX ADMIN — Medication 1: at 08:00

## 2019-12-05 RX ADMIN — BUDESONIDE AND FORMOTEROL FUMARATE DIHYDRATE 2 PUFF(S): 160; 4.5 AEROSOL RESPIRATORY (INHALATION) at 08:01

## 2019-12-05 RX ADMIN — AMIODARONE HYDROCHLORIDE 200 MILLIGRAM(S): 400 TABLET ORAL at 17:30

## 2019-12-05 RX ADMIN — AMIODARONE HYDROCHLORIDE 200 MILLIGRAM(S): 400 TABLET ORAL at 05:24

## 2019-12-05 RX ADMIN — HEPARIN SODIUM 20 UNIT(S)/HR: 5000 INJECTION INTRAVENOUS; SUBCUTANEOUS at 14:11

## 2019-12-05 RX ADMIN — CLOPIDOGREL BISULFATE 75 MILLIGRAM(S): 75 TABLET, FILM COATED ORAL at 14:12

## 2019-12-05 RX ADMIN — Medication 10 UNIT(S): at 08:00

## 2019-12-05 RX ADMIN — Medication 81 MILLIGRAM(S): at 14:12

## 2019-12-05 RX ADMIN — SACUBITRIL AND VALSARTAN 1 TABLET(S): 24; 26 TABLET, FILM COATED ORAL at 17:30

## 2019-12-05 RX ADMIN — CHLORHEXIDINE GLUCONATE 1 APPLICATION(S): 213 SOLUTION TOPICAL at 05:24

## 2019-12-05 RX ADMIN — SACUBITRIL AND VALSARTAN 1 TABLET(S): 24; 26 TABLET, FILM COATED ORAL at 05:24

## 2019-12-05 NOTE — CONSULT NOTE ADULT - SUBJECTIVE AND OBJECTIVE BOX
HPI:   Neto Frey is a 65 yo man, domiciled with with and two children, with extensive PMHx, no reported or documented psychiatric history, suicide attempts, who is currently admitted to the CCU for new onset Afib. Upon approach, pt reports that he is "frustrated with the doctors", stating that they are trying to keep him here longer than he needs to be because they are trying to "get money from his insurance". States that he reported that he wanted to "jump off the bridge" to the nurse today because he was frustrated, and that he has has been having these thoughts since he was 16yo but has never acted on the thoughts. States that he would never act on them as he is afraid of heights, and furthermore, that he would never kill himself because he has his wife and children to look after. States that he wants to leave AMA because he needs to tend to his family as they are "disabled" and need money; reports his wife tried to get money from the bank using his bank card but would not provide her any funds as she is not on his account. States that they have no source of income and that he needs to get home to buy them food. States that he is willing to follow up on Monday as an outpatient. Denies that he has any homicidal thoughts, intent or plan, and denies on several occasions that he would harm himself. States that if he is to have any thoughts of wanting to harm himself or others, that he would take himself to ED at [facility].     Pt denies collateral information as he states "my wife's health is worse than mine, don't bother  her".      Vitals:  Vital Signs Last 24 Hrs  T(C): 36.6 (05 Dec 2019 16:00), Max: 36.9 (05 Dec 2019 04:00)  T(F): 97.8 (05 Dec 2019 16:00), Max: 98.4 (05 Dec 2019 04:00)  HR: 86 (05 Dec 2019 18:00) (74 - 86)  BP: 155/77 (05 Dec 2019 17:20) (110/79 - 174/72)  BP(mean): 105 (05 Dec 2019 17:20) (79 - 121)  RR: 31 (05 Dec 2019 18:00) (15 - 40)  SpO2: 95% (05 Dec 2019 16:00) (91% - 98%)    Labs:  CBC Full  -  ( 05 Dec 2019 04:34 )  WBC Count : 9.41 K/uL  RBC Count : 5.10 M/uL  Hemoglobin : 15.0 g/dL  Hematocrit : 45.1 %  Platelet Count - Automated : 190 K/uL  Mean Cell Volume : 88.4 fL  Mean Cell Hemoglobin : 29.4 pg  Mean Cell Hemoglobin Concentration : 33.3 g/dL  Auto Neutrophil # : 6.48 K/uL  Auto Lymphocyte # : 1.90 K/uL  Auto Monocyte # : 0.60 K/uL  Auto Eosinophil # : 0.30 K/uL  Auto Basophil # : 0.08 K/uL  Auto Neutrophil % : 68.8 %  Auto Lymphocyte % : 20.2 %  Auto Monocyte % : 6.4 %  Auto Eosinophil % : 3.2 %  Auto Basophil % : 0.9 %    12-05    141  |  103  |  14  ----------------------------<  185<H>  4.3   |  20  |  1.3    Ca    9.2      05 Dec 2019 04:34  Phos  2.9     12-05  Mg     2.1     12-05    TPro  6.5  /  Alb  4.1  /  TBili  0.8  /  DBili  0.2  /  AST  35  /  ALT  44<H>  /  AlkPhos  39  12-05    Mental Status Exam:  Mood: "Frustrated"  Affect: Frustrated, congruent, full  Speech: Normal rate, rhythm, articulation (loud at times but overall normal tone)  Thought process: linear  Thought content: Unremarkable; denies SI/HI, intent or plan  Perceptions: None reported  Memory: Recent and remote intact  Fund of knowledge: unable to be assessed

## 2019-12-05 NOTE — DISCHARGE NOTE PROVIDER - NSDCMRMEDTOKEN_GEN_ALL_CORE_FT
amiodarone 200 mg oral tablet: 1 tab(s) orally twice a day until 12/11/19 then once daily  Aspirin Enteric Coated 81 mg oral delayed release tablet: 1 tab(s) orally once a day MDD:1  atorvastatin 40 mg oral tablet: 1 tab(s) orally once a day (at bedtime)  Bevespi Aerosphere 9 mcg-4.8 mcg/inh inhalation aerosol: 2 puff(s) inhaled 2 times a day  clopidogrel 75 mg oral tablet: 1 tab(s) orally once a day  Eliquis 5 mg oral tablet: 1 tab(s) orally 2 times a day   ESZOPICLONE 3 MG TABLET: 1 tab(s) orally once a day  FENOFIBRATE 160 MG TABLET: 1 tab(s) orally once a day  furosemide 40 mg oral tablet: 1 tab(s) orally once a day  metFORMIN 750 mg oral tablet, extended release: HOLD X 48 HRS POST CATH, 1 tab(s) orally once a day  metoprolol succinate 200 mg oral capsule, extended release: 1 cap(s) orally once a day  NovoLOG 100 units/mL injectable solution: 8 unit(s) injectable once a day  OMEGA-3 ETHYL ESTERS 1 GM CAP: 1 cap(s) orally once a day  sacubitril-valsartan 24 mg-26 mg oral tablet: 1 tab(s) orally 2 times a day  Symbicort 160 mcg-4.5 mcg/inh inhalation aerosol: 1 puff(s) inhaled 2 times a day   Tresiba 100 units/mL subcutaneous solution: 120 unit(s) subcutaneous once a day  TRESIBA FLEXTOUCH 200 UNITS/ML:   TRULICITY 1.5 MG/0.5 ML PEN: amiodarone 200 mg oral tablet: 1 tab(s) orally twice a day until 12/11/19 then once daily  Aspirin Enteric Coated 81 mg oral delayed release tablet: 1 tab(s) orally once a day MDD:1  atorvastatin 40 mg oral tablet: 1 tab(s) orally once a day (at bedtime)  Bevespi Aerosphere 9 mcg-4.8 mcg/inh inhalation aerosol: 2 puff(s) inhaled 2 times a day  clopidogrel 75 mg oral tablet: 1 tab(s) orally once a day  Eliquis 5 mg oral tablet: 1 tab(s) orally 2 times a day   ESZOPICLONE 3 MG TABLET: 1 tab(s) orally once a day  FENOFIBRATE 160 MG TABLET: 1 tab(s) orally once a day  furosemide 40 mg oral tablet: 1 tab(s) orally once a day  metFORMIN 750 mg oral tablet, extended release: HOLD X 48 HRS POST CATH, 1 tab(s) orally once a day  metoprolol succinate 200 mg oral capsule, extended release: 1 cap(s) orally once a day  NovoLOG 100 units/mL injectable solution: 8 unit(s) injectable once a day  OMEGA-3 ETHYL ESTERS 1 GM CAP: 1 cap(s) orally once a day  Symbicort 160 mcg-4.5 mcg/inh inhalation aerosol: 1 puff(s) inhaled 2 times a day   Tresiba 100 units/mL subcutaneous solution: 120 unit(s) subcutaneous once a day  TRESIBA FLEXTOUCH 200 UNITS/ML:   TRULICITY 1.5 MG/0.5 ML PEN:

## 2019-12-05 NOTE — CHART NOTE - NSCHARTNOTEFT_GEN_A_CORE
Was called by nurse because the patient said that he wanted to "jump off the [place] bridge" Psychiatry was immediately consulted as patient had suicide ideation with a plan.  Psyhciatry came to see the patient and did not recommend for inpatient psych hospitalization at this time, to follow up outpatient, and is go to the nearest ED if he has thoughts of hurting himself or others; he agrees with the safety plan as per psych.  Patient to be discharged AMA.

## 2019-12-05 NOTE — DISCHARGE NOTE PROVIDER - CARE PROVIDER_API CALL
Khalif Lamar)  Cardiovascular Disease; Internal Medicine  [facility]iology at [address], [place] [address]  Phone: [phone]  Fax: [phone]  Follow Up Time: 1-3 days

## 2019-12-05 NOTE — PROGRESS NOTE ADULT - REASON FOR ADMISSION
Atrial fibrillation new onset

## 2019-12-05 NOTE — DISCHARGE NOTE PROVIDER - NSDCCPCAREPLAN_GEN_ALL_CORE_FT
PRINCIPAL DISCHARGE DIAGNOSIS  Diagnosis: Afib  Assessment and Plan of Treatment: continue Eliquis, follow up with cardiology      SECONDARY DISCHARGE DIAGNOSES  Diagnosis: Troponin level elevated  Assessment and Plan of Treatment: PRINCIPAL DISCHARGE DIAGNOSIS  Diagnosis: Afib  Assessment and Plan of Treatment: You were found to have atrial fibrillation with a fast heart rate.  You were treated with medications that helps control this rate and rhythm.  This means that you are at an increased risk of stroke.    YOU ARE SIGNING OUT AGAINST MEDICAL ADVICE AND YOU ARE AT RISK FOR STROKE, BLEEDING AND POSSIBLE DEATH!!!      SECONDARY DISCHARGE DIAGNOSES  Diagnosis: Congestive heart failure  Assessment and Plan of Treatment: You have a history of heart failure  This means that your heart isn't able to pump well, which can lead to fluids being accumulating in your legs, abdomen and lungs  This also puts you at an increased risk for arrythmia which you can faint and possibly die from  Please follow up with your cardiologist  You were supposed to get a biventricular pacemaker on Monday, but you are signing out against medical advice   YOU ARE SIGNING OUT AGAINST MEDICAL ADVICE AND YOU ARE AT RISK FOR STROKE, BLEEDING AND POSSIBLE DEATH!!!

## 2019-12-05 NOTE — CONSULT NOTE ADULT - SUBJECTIVE AND OBJECTIVE BOX
Surgeon: /Demario/ Damon    Consult requesting by: Dr. Lamar    HISTORY OF PRESENT ILLNESS:  64 year old male  known to have COPD not on home O2, CAD w/ stent (recent in Oct), HFrEF, HTN, DM II, DLD presented to the ED for chest pain.  As per pt, he suddenly started having chest pain, started at around 2:30 am while he was watching tv, central, crushing pain, constant, radiating to the back between shoulder blades, 8/10 on severity. It lasted for 3 hours until pt came to ED. He tried taking 1 tablet of nitro and 3 aleeve at home but it did not help. He then drove to ED.  Patient is a current daily smoker of 3/4 of pack daily for > 20 years, last cigarette was on day prior to presentation. Patient does not have a pulmonologist.    In the ED, Pt was found to be in new onset AFIB with RVR. he was given cardizem 5 mg iv push x 2 times. His BP then dropped to 80s systolic. He was given 1 L IVF which helped improve BP to 100s systolic but then again remained in 90s systolic.  Another 500 bolus ordered. Pt was seen by cardiology and recommended amiodarone bolus and infusion. Pt stated the chest pain resolved but denied palpitations, sob, nausea, lightheadedness. Urgent CT chest angio dissection ordered. (02 Dec 2019 09:39)    Patient has low EF of 25%; LBBB; and rapid AF with bradycardia when converts to NSR.  Needs B-V ICD with pacing.      PAST MEDICAL & SURGICAL HISTORY:  Dyslipidemia  Stented coronary artery  HTN (hypertension)  Chronic obstructive pulmonary disease (COPD)  CAD (coronary artery disease)  Diabetes  CHF (congestive heart failure)  Heart valve replaced  H/O heart artery stent      MEDICATIONS  (STANDING):  aMIOdarone    Tablet 200 milliGRAM(s) Oral two times a day  aspirin enteric coated 81 milliGRAM(s) Oral daily  atorvastatin 40 milliGRAM(s) Oral at bedtime  budesonide 160 MICROgram(s)/formoterol 4.5 MICROgram(s) Inhaler 2 Puff(s) Inhalation two times a day  chlorhexidine 4% Liquid 1 Application(s) Topical <User Schedule>  clopidogrel Tablet 75 milliGRAM(s) Oral daily  dextrose 5%. 1000 milliLiter(s) (50 mL/Hr) IV Continuous <Continuous>  dextrose 50% Injectable 12.5 Gram(s) IV Push once  dextrose 50% Injectable 25 Gram(s) IV Push once  dextrose 50% Injectable 25 Gram(s) IV Push once  fenofibrate Tablet 145 milliGRAM(s) Oral daily  heparin  Infusion 1900 Unit(s)/Hr (20 mL/Hr) IV Continuous <Continuous>  insulin glargine Injectable (LANTUS) 28 Unit(s) SubCutaneous at bedtime  insulin lispro (HumaLOG) corrective regimen sliding scale   SubCutaneous three times a day before meals  insulin lispro Injectable (HumaLOG) 10 Unit(s) SubCutaneous before breakfast  insulin lispro Injectable (HumaLOG) 10 Unit(s) SubCutaneous before lunch  insulin lispro Injectable (HumaLOG) 10 Unit(s) SubCutaneous before dinner  sacubitril 24 mG/valsartan 26 mG 1 Tablet(s) Oral two times a day  zolpidem 5 milliGRAM(s) Oral at bedtime    MEDICATIONS  (PRN):  dextrose 40% Gel 15 Gram(s) Oral once PRN Blood Glucose LESS THAN 70 milliGRAM(s)/deciliter  glucagon  Injectable 1 milliGRAM(s) IntraMuscular once PRN Glucose LESS THAN 70 milligrams/deciliter      PHYSICAL EXAM  Vital Signs Last 24 Hrs  T(C): 36.5 (05 Dec 2019 12:00), Max: 36.9 (05 Dec 2019 04:00)  T(F): 97.7 (05 Dec 2019 12:00), Max: 98.4 (05 Dec 2019 04:00)  HR: 82 (05 Dec 2019 16:00) (74 - 86)  BP: 161/67 (05 Dec 2019 16:00) (110/79 - 174/72)  BP(mean): 87 (05 Dec 2019 16:00) (79 - 121)  RR: 30 (05 Dec 2019 16:00) (15 - 40)  SpO2: 95% (05 Dec 2019 16:00) (91% - 98%)                                                              LABS:                        15.0   9.41  )-----------( 190      ( 05 Dec 2019 04:34 )             45.1     12-05    141  |  103  |  14  ----------------------------<  185<H>  4.3   |  20  |  1.3    Ca    9.2      05 Dec 2019 04:34  Phos  2.9     12-05  Mg     2.1     12-05    TPro  6.5  /  Alb  4.1  /  TBili  0.8  /  DBili  0.2  /  AST  35  /  ALT  44<H>  /  AlkPhos  39  12-05    PT/INR - ( 05 Dec 2019 04:34 )   PT: 13.10 sec;   INR: 1.14 ratio         PTT - ( 05 Dec 2019 04:34 )  PTT:67.4 sec    CARDIAC MARKERS ( 05 Dec 2019 04:34 )  x     / 0.95 ng/mL / x     / x     / x      CARDIAC MARKERS ( 04 Dec 2019 04:30 )  x     / 0.65 ng/mL / 292 U/L / x     / x      CARDIAC MARKERS ( 03 Dec 2019 17:20 )        Assessment/ Plan:    Case discussed with Dr. Jose and Dr. Fall.  Scheduled for [employer] Bi-V ICD on monday with Dr. Jose.

## 2019-12-05 NOTE — CHART NOTE - NSCHARTNOTEFT_GEN_A_CORE
65 y/o M PMHx COPD not on home O2, CAD w/ stent (recent in Oct), HFrEF, HTN, DM II, DLD presented to the ED for chest pain. Patient is a current daily smoker of 3/4 of pack daily for > 20 years, last cigarette was on day prior to presentation. In the ED, Pt was found to be in new onset AFIB with RVR. he was given cardizem 5 mg iv push x 2 times. His BP then dropped to 80s systolic. He was given 1 L IVF which helped improve BP to 100s systolic but then again remained in 90s systolic.  Another 500 bolus given  Pt was seen by cardiology and recommended amiodarone bolus and infusion. Pt stable in CCU, on oral amiodarone and scheduled for AICD biventricular pacemaker with Dr. Li for Monday. Pt can be followed on the telemetry floor.     #New onset Afib  - c/w amiodarone PO 7 days 200mg BID (until 12/11/19) then 200 mg qd   - c/w heparin drip   - f/u PTT  - f/u TSH and LFT   - f/u EP     #H/o CAD s/p stent, chronic  Systolic CHF, aortic stenosis  - ECHO 12/3/19 - EF 30-35%  - c/w ASA, plavix, statin  - c/w Entresto  - planned for AICD/biventricular pacemaker- spoke with cardiologist Dr. Lamar, Dr. Fall on board   - CT surg/Dr. Li aware of patient, plan for procedure on Monday 12/19     #?iliac dissection  - f/u vascular recs     #DM2  - monitor FS  - increase insulin     #Dyslipidemia  - c/w fenofibrate, statin    #COPD  - c/w albuterol, budesonide    # CKD stage 2 stable    # full code  # Disposition: home when stable

## 2019-12-05 NOTE — DISCHARGE NOTE PROVIDER - NSDCFUSCHEDAPPT_GEN_ALL_CORE_FT
MONIQUE LYONS ; 12/19/2019 ; [facility] [address] MONIQUE LYONS ; 12/19/2019 ; [facility] [address] MONIQUE LYONS ; 12/19/2019 ; [facility] [address]

## 2019-12-05 NOTE — DISCHARGE NOTE NURSING/CASE MANAGEMENT/SOCIAL WORK - NSDCPEPT PROEDHF_GEN_ALL_CORE
Report signs and symptoms to primary care provider/Call primary care provider for follow up after discharge/Activities as tolerated/Monitor weight daily/Low salt diet

## 2019-12-05 NOTE — PROGRESS NOTE ADULT - SUBJECTIVE AND OBJECTIVE BOX
INTERVAL HPI/OVERNIGHT EVENTS:  Patient without complaints. SR in the 70s. No tele events noted.    MEDICATIONS  (STANDING):  aMIOdarone    Tablet 200 milliGRAM(s) Oral two times a day  aspirin enteric coated 81 milliGRAM(s) Oral daily  atorvastatin 40 milliGRAM(s) Oral at bedtime  budesonide 160 MICROgram(s)/formoterol 4.5 MICROgram(s) Inhaler 2 Puff(s) Inhalation two times a day  chlorhexidine 4% Liquid 1 Application(s) Topical <User Schedule>  clopidogrel Tablet 75 milliGRAM(s) Oral daily  fenofibrate Tablet 145 milliGRAM(s) Oral daily  heparin  Infusion 1900 Unit(s)/Hr (20 mL/Hr) IV Continuous <Continuous>  insulin glargine Injectable (LANTUS) 28 Unit(s) SubCutaneous at bedtime  insulin lispro (HumaLOG) corrective regimen sliding scale   SubCutaneous three times a day before meals  insulin lispro Injectable (HumaLOG) 10 Unit(s) SubCutaneous before breakfast  insulin lispro Injectable (HumaLOG) 10 Unit(s) SubCutaneous before lunch  insulin lispro Injectable (HumaLOG) 10 Unit(s) SubCutaneous before dinner  sacubitril 24 mG/valsartan 26 mG 1 Tablet(s) Oral two times a day  zolpidem 5 milliGRAM(s) Oral at bedtime    MEDICATIONS  (PRN):  dextrose 40% Gel 15 Gram(s) Oral once PRN Blood Glucose LESS THAN 70 milliGRAM(s)/deciliter  glucagon  Injectable 1 milliGRAM(s) IntraMuscular once PRN Glucose LESS THAN 70 milligrams/deciliter      Allergies  sulfa drugs (Unknown)      REVIEW OF SYSTEMS  A ten-point review of systems was otherwise negative except as noted.    Vital Signs Last 24 Hrs  T(C): 36.5 (05 Dec 2019 12:00), Max: 36.9 (05 Dec 2019 04:00)  T(F): 97.7 (05 Dec 2019 12:00), Max: 98.4 (05 Dec 2019 04:00)  HR: 82 (05 Dec 2019 16:00) (74 - 86)  BP: 161/67 (05 Dec 2019 16:00) (110/79 - 174/72)  BP(mean): 87 (05 Dec 2019 16:00) (79 - 121)  RR: 30 (05 Dec 2019 16:00) (15 - 40)  SpO2: 95% (05 Dec 2019 16:00) (91% - 98%)    12-04-19 @ 07:01  -  12-05-19 @ 07:00  --------------------------------------------------------  IN: 1740 mL / OUT: 1100 mL / NET: 640 mL    12-05-19 @ 07:01  -  12-05-19 @ 16:36  --------------------------------------------------------  IN: 540 mL / OUT: 200 mL / NET: 340 mL    Physical Exam    GENERAL: In no apparent distress, well nourished, and hydrated.  HEART: Irregular rate and rhythm; No murmurs, rubs, or gallops.  PULMONARY: Clear to auscultation and perfusion.  No rales, wheezing, or rhonchi bilaterally.  ABDOMEN: Soft, Nontender, Nondistended; Bowel sounds present  EXTREMITIES:  2+ Peripheral Pulses, No clubbing, cyanosis  NEUROLOGICAL: Grossly nonfocal    LABS:                        15.0   9.41  )-----------( 190      ( 05 Dec 2019 04:34 )             45.1     12-05    141  |  103  |  14  ----------------------------<  185<H>  4.3   |  20  |  1.3    Ca    9.2      05 Dec 2019 04:34  Phos  2.9     12-05  Mg     2.1     12-05    TPro  6.5  /  Alb  4.1  /  TBili  0.8  /  DBili  0.2  /  AST  35  /  ALT  44<H>  /  AlkPhos  39  12-05    PT/INR - ( 05 Dec 2019 04:34 )   PT: 13.10 sec;   INR: 1.14 ratio         PTT - ( 05 Dec 2019 04:34 )  PTT:67.4 sec      RADIOLOGY & ADDITIONAL TESTS:  < from: Transthoracic Echocardiogram (12.03.19 @ 10:38) >  Summary:   1. Left ventricular ejection fraction, by visual estimation, is 30 to   35%.   2. Severely decreased global left ventricular systolic function.   3. Multiple left ventricular regional wall motion abnormalities exist.   See wall motion findings.   4. Elevated left atrial and left ventricular end-diastolic pressures.   5. Mildly increased LV wall thickness.   6. Spectral Doppler shows pseudonormal pattern of left ventricular   myocardial filling (Grade II diastolic dysfunction).   7. Moderate mitral annular calcification.   8. Moderate aortic valve stenosis.   9. LA volume Index is 47.2 ml/m² ml/m2.  10. Mitral valve mean gradient is 1.9 mmHg consistent with normal mitral   stenosis.  11. Peak transaortic gradient equals 44.3 mmHg, mean transaortic gradient   equals 24.1 mmHg, the calculated aortic valve area equals 1.55 cm² by the   continuity equation consistent with moderate aortic stenosis.    < end of copied text >    < from: 12 Lead ECG (12.05.19 @ 07:47) >  Ventricular Rate 85 BPM    Atrial Rate 85 BPM    P-R Interval 208 ms    QRS Duration 162 ms    Q-T Interval 440 ms    QTC Calculation(Bezet) 523 ms    P Axis 51 degrees    R Axis -20 degrees    T Axis 153 degrees    Diagnosis Line Normal sinus rhythm  Left bundle branch block  Abnormal ECG    < end of copied text >

## 2019-12-05 NOTE — PROGRESS NOTE ADULT - ASSESSMENT
A 63 yo male with PMH of HTN, DM type2, CAD s/p PCI, HFrEF,  COPD came to ED for chest pain. He was found to be in new onset AFIB with RVR.     A/P:   New onset atrial fibrillation with RVR:   Sinus rhythm, continue Amiodarone, can switch heparin to Lovenox therapeutic before AICD placement. Start on Eliquis after the procedure.     Chronic HrEF:   Echo showed LVEF 30 -35%. Multiple LV regional wall motion abnormalities. Grade II diastolic dysfunction Moderate AS.  Plan for AICD/CRT by CT surgery.   continue Entresto, may resume Metoprolol     CAD s/p PCI For LAD and RCA   Continue ASA, Plavix and Lipitor.    History of aortic valve replacement:   Bioprosthetic valve. Stable.     COPD  On albuterol, budesonide    DM type 2  A1c 8.3, Continue Lantus and Humalog.     #Progress Note Handoff:  Pending (specify):  AICD placement  Family discussion:  Disposition: Home

## 2019-12-05 NOTE — DISCHARGE NOTE PROVIDER - HOSPITAL COURSE
A 65 yo male with PMH of HTN, DM type2, CAD s/p PCI, HFrEF,  COPD came to ED for chest pain. He was found to be in new onset AFIB with RVR.     He started on amiodarone drip, his rhythm converted to sinus. He has HFrEF, Electrophysiology consult recommended AICD and CRT, patient was planned to have it done on Monday, patient is refusing to stay in the hospital. He will sign against medical advice, he understand the risk of leaving the hospital which include cardiac arrhythmia and possible death.         A/P:     New onset atrial fibrillation with RVR:     Sinus rhythm, continue Amiodarone, can switch heparin to Lovenox therapeutic before AICD placement. Start on Eliquis after the procedure.         Chronic HrEF:     Echo showed LVEF 30 -35%. Multiple LV regional wall motion abnormalities. Grade II diastolic dysfunction Moderate AS.    Plan for AICD/CRT by CT surgery.     continue Entresto, may resume Metoprolol         CAD s/p PCI For LAD and RCA     Continue ASA, Plavix and Lipitor.        History of aortic valve replacement:     Bioprosthetic valve. Stable.         COPD    On albuterol, budesonide        DM type 2    A1c 8.3, Continue home medications.

## 2019-12-05 NOTE — PROGRESS NOTE ADULT - SUBJECTIVE AND OBJECTIVE BOX
MOINQUE LYONS  64y  Male      Patient is a 64y old  Male who presents with a chief complaint of Atrial fibrillation new onset (05 Dec 2019 13:27)      INTERVAL HPI/OVERNIGHT EVENTS:  He feels ok, no chest pain or SOB, no palpitation.   Vital Signs Last 24 Hrs  T(C): 36.5 (05 Dec 2019 12:00), Max: 36.9 (05 Dec 2019 04:00)  T(F): 97.7 (05 Dec 2019 12:00), Max: 98.4 (05 Dec 2019 04:00)  HR: 76 (05 Dec 2019 14:00) (76 - 86)  BP: 150/78 (05 Dec 2019 14:00) (110/79 - 174/72)  BP(mean): 101 (05 Dec 2019 14:00) (79 - 121)  RR: 17 (05 Dec 2019 14:00) (17 - 40)  SpO2: 96% (05 Dec 2019 14:00) (91% - 98%)      12-04-19 @ 07:01  -  12-05-19 @ 07:00  --------------------------------------------------------  IN: 1740 mL / OUT: 1100 mL / NET: 640 mL    12-05-19 @ 07:01  -  12-05-19 @ 14:50  --------------------------------------------------------  IN: 420 mL / OUT: 200 mL / NET: 220 mL            Consultant(s) Notes Reviewed:  [x ] YES  [ ] NO          MEDICATIONS  (STANDING):  aMIOdarone    Tablet 200 milliGRAM(s) Oral two times a day  aspirin enteric coated 81 milliGRAM(s) Oral daily  atorvastatin 40 milliGRAM(s) Oral at bedtime  budesonide 160 MICROgram(s)/formoterol 4.5 MICROgram(s) Inhaler 2 Puff(s) Inhalation two times a day  chlorhexidine 4% Liquid 1 Application(s) Topical <User Schedule>  clopidogrel Tablet 75 milliGRAM(s) Oral daily  dextrose 5%. 1000 milliLiter(s) (50 mL/Hr) IV Continuous <Continuous>  dextrose 50% Injectable 12.5 Gram(s) IV Push once  dextrose 50% Injectable 25 Gram(s) IV Push once  dextrose 50% Injectable 25 Gram(s) IV Push once  fenofibrate Tablet 145 milliGRAM(s) Oral daily  heparin  Infusion 1900 Unit(s)/Hr (20 mL/Hr) IV Continuous <Continuous>  insulin glargine Injectable (LANTUS) 28 Unit(s) SubCutaneous at bedtime  insulin lispro (HumaLOG) corrective regimen sliding scale   SubCutaneous three times a day before meals  insulin lispro Injectable (HumaLOG) 10 Unit(s) SubCutaneous before breakfast  insulin lispro Injectable (HumaLOG) 10 Unit(s) SubCutaneous before lunch  insulin lispro Injectable (HumaLOG) 10 Unit(s) SubCutaneous before dinner  sacubitril 24 mG/valsartan 26 mG 1 Tablet(s) Oral two times a day  zolpidem 5 milliGRAM(s) Oral at bedtime    MEDICATIONS  (PRN):  dextrose 40% Gel 15 Gram(s) Oral once PRN Blood Glucose LESS THAN 70 milliGRAM(s)/deciliter  glucagon  Injectable 1 milliGRAM(s) IntraMuscular once PRN Glucose LESS THAN 70 milligrams/deciliter      LABS                          15.0   9.41  )-----------( 190      ( 05 Dec 2019 04:34 )             45.1     12-05    141  |  103  |  14  ----------------------------<  185<H>  4.3   |  20  |  1.3    Ca    9.2      05 Dec 2019 04:34  Phos  2.9     12-05  Mg     2.1     12-05    TPro  6.5  /  Alb  4.1  /  TBili  0.8  /  DBili  0.2  /  AST  35  /  ALT  44<H>  /  AlkPhos  39  12-05        PT/INR - ( 05 Dec 2019 04:34 )   PT: 13.10 sec;   INR: 1.14 ratio         PTT - ( 05 Dec 2019 04:34 )  PTT:67.4 sec  Lactate Trend    CARDIAC MARKERS ( 05 Dec 2019 04:34 )  x     / 0.95 ng/mL / x     / x     / x      CARDIAC MARKERS ( 04 Dec 2019 04:30 )  x     / 0.65 ng/mL / 292 U/L / x     / x      CARDIAC MARKERS ( 03 Dec 2019 17:20 )  x     / 0.43 ng/mL / 319 U/L / x     / 11.7 ng/mL      CAPILLARY BLOOD GLUCOSE      POCT Blood Glucose.: 273 mg/dL (05 Dec 2019 12:16)        RADIOLOGY & ADDITIONAL TESTS:    Imaging Personally Reviewed:  [ ] YES  [ ] NO    HEALTH ISSUES - PROBLEM Dx:        PHYSICAL EXAM:  GENERAL: NAD, well-developed  HEAD:  Atraumatic, Normocephalic  EYES: EOMI, PERRLA, conjunctiva and sclera clear  NECK: Supple, No JVD  CHEST/LUNG: Clear to auscultation bilaterally; No wheeze  HEART: Irregular rate and rhythm; S1 S2  ABDOMEN: Soft, Nontender, Nondistended; Bowel sounds present  EXTREMITIES:  2+ Peripheral Pulses, No clubbing, cyanosis, or edema  PSYCH: AAOx3  NEUROLOGY: non-focal  SKIN: No rashes or lesions

## 2019-12-05 NOTE — DISCHARGE NOTE NURSING/CASE MANAGEMENT/SOCIAL WORK - PATIENT PORTAL LINK FT
You can access the FollowMyHealth Patient Portal offered by [facility] by registering at the following website: [email or web address]. By joining [employer]’s FollowMyHealth portal, you will also be able to view your health information using other applications (apps) compatible with our system.

## 2019-12-05 NOTE — CONSULT NOTE ADULT - REASON FOR ADMISSION
Atrial fibrillation new onset
Chest pain

## 2019-12-05 NOTE — PROGRESS NOTE ADULT - SUBJECTIVE AND OBJECTIVE BOX
Patient is a 64y old  Male who presents with a chief complaint of Atrial fibrillation new onset (04 Dec 2019 15:04)      INTERVAL HPI/OVERNIGHT EVENTS:  ICU Vital Signs Last 24 Hrs  T(C): 36.7 (05 Dec 2019 08:00), Max: 36.9 (05 Dec 2019 04:00)  T(F): 98.1 (05 Dec 2019 08:00), Max: 98.4 (05 Dec 2019 04:00)  HR: 86 (05 Dec 2019 09:00) (78 - 88)  BP: 117/62 (05 Dec 2019 09:00) (117/62 - 174/72)  BP(mean): 89 (05 Dec 2019 09:00) (86 - 121)  RR: 32 (05 Dec 2019 09:00) (17 - 40)  SpO2: 94% (05 Dec 2019 09:00) (86% - 98%)    I&O's Summary    04 Dec 2019 07:01  -  05 Dec 2019 07:00  --------------------------------------------------------  IN: 1740 mL / OUT: 1100 mL / NET: 640 mL    05 Dec 2019 07:01  -  05 Dec 2019 10:03  --------------------------------------------------------  IN: 340 mL / OUT: 0 mL / NET: 340 mL      LABS:                        15.0   9.41  )-----------( 190      ( 05 Dec 2019 04:34 )             45.1     12-05    141  |  103  |  14  ----------------------------<  185<H>  4.3   |  20  |  1.3    Ca    9.2      05 Dec 2019 04:34  Phos  2.9     12-05  Mg     2.1     12-05    TPro  6.5  /  Alb  4.1  /  TBili  0.8  /  DBili  0.2  /  AST  35  /  ALT  44<H>  /  AlkPhos  39  12-05    PT/INR - ( 05 Dec 2019 04:34 )   PT: 13.10 sec;   INR: 1.14 ratio         PTT - ( 05 Dec 2019 04:34 )  PTT:67.4 sec    CAPILLARY BLOOD GLUCOSE      POCT Blood Glucose.: 170 mg/dL (05 Dec 2019 07:51)  POCT Blood Glucose.: 195 mg/dL (04 Dec 2019 21:24)  POCT Blood Glucose.: 200 mg/dL (04 Dec 2019 16:52)  POCT Blood Glucose.: 207 mg/dL (04 Dec 2019 11:57)        RADIOLOGY & ADDITIONAL TESTS:    Consultant(s) Notes Reviewed:  [x ] YES  [ ] NO    MEDICATIONS  (STANDING):  aMIOdarone    Tablet 200 milliGRAM(s) Oral two times a day  aspirin enteric coated 81 milliGRAM(s) Oral daily  atorvastatin 40 milliGRAM(s) Oral at bedtime  budesonide 160 MICROgram(s)/formoterol 4.5 MICROgram(s) Inhaler 2 Puff(s) Inhalation two times a day  chlorhexidine 4% Liquid 1 Application(s) Topical <User Schedule>  clopidogrel Tablet 75 milliGRAM(s) Oral daily  dextrose 5%. 1000 milliLiter(s) (50 mL/Hr) IV Continuous <Continuous>  dextrose 50% Injectable 12.5 Gram(s) IV Push once  dextrose 50% Injectable 25 Gram(s) IV Push once  dextrose 50% Injectable 25 Gram(s) IV Push once  fenofibrate Tablet 145 milliGRAM(s) Oral daily  heparin  Infusion 1900 Unit(s)/Hr (20 mL/Hr) IV Continuous <Continuous>  insulin glargine Injectable (LANTUS) 28 Unit(s) SubCutaneous at bedtime  insulin lispro (HumaLOG) corrective regimen sliding scale   SubCutaneous three times a day before meals  insulin lispro Injectable (HumaLOG) 10 Unit(s) SubCutaneous before breakfast  insulin lispro Injectable (HumaLOG) 10 Unit(s) SubCutaneous before lunch  insulin lispro Injectable (HumaLOG) 10 Unit(s) SubCutaneous before dinner  sacubitril 24 mG/valsartan 26 mG 1 Tablet(s) Oral two times a day  zolpidem 5 milliGRAM(s) Oral at bedtime    MEDICATIONS  (PRN):  dextrose 40% Gel 15 Gram(s) Oral once PRN Blood Glucose LESS THAN 70 milliGRAM(s)/deciliter  glucagon  Injectable 1 milliGRAM(s) IntraMuscular once PRN Glucose LESS THAN 70 milligrams/deciliter      PHYSICAL EXAM:   CONSTITUTIONAL: Well-developed; well-nourished; in no acute distress.   SKIN: warm, dry  HEAD: Normocephalic; atraumatic.  EYES: no conjunctival injection. EOMI.   ENT: No nasal discharge; airway clear.  NECK: Supple; non tender.  CARD: S1, S2 normal; no murmurs, gallops, or rubs. RRR  RESP: No wheezes, rales or rhonchi.  ABD: soft ntnd.   EXT: No LE edema.   NEURO: Alert, oriented, grossly unremarkable.      Care Discussed with Consultants Providers [ x] YES  [ ] NO      ASSESSMENT & PLAN    63 y/o M PMHx COPD not on home O2, CAD w/ stent (recent in Oct), HFrEF, HTN, DM II, DLD presented to the ED for chest pain. Patient is a current daily smoker of 3/4 of pack daily for > 20 years, last cigarette was on day prior to presentation. In the ED, Pt was found to be in new onset AFIB with RVR. he was given cardizem 5 mg iv push x 2 times. His BP then dropped to 80s systolic. He was given 1 L IVF which helped improve BP to 100s systolic but then again remained in 90s systolic.  Another 500 bolus given  Pt was seen by cardiology and recommended amiodarone bolus and infusion.    #New onset Afib  - c/w amiodarone PO 7 days 200mg BID (until 12/11/19) then 200 mg qd   - c/w heparin drip   - f/u PTT  - f/u TSH and LFT   - f/u EP     #H/o CAD s/p stent, chronic  Systolic CHF, aortic stenosis  - ECHO 12/3/19 - EF 30-35%  - c/w ASA, plavix, statin  - c/w Entresto  - planned for AICD/biventricular pacemaker- spoke with cardiologist Dr. Lamar, Dr. Fall on board   - consult for CT surg placed, pending consult     #?iliac dissection  - f/u vascular recs     #DM2  - monitor FS  - increase insulin     #Dyslipidemia  - c/w fenofibrate, statin    #COPD  - c/w albuterol, budesonide    # CKD stage 2 stable    # full code  # Disposition: home when stable Patient is a 64y old  Male who presents with a chief complaint of Atrial fibrillation new onset (04 Dec 2019 15:04)      INTERVAL HPI/OVERNIGHT EVENTS:  ICU Vital Signs Last 24 Hrs  T(C): 36.7 (05 Dec 2019 08:00), Max: 36.9 (05 Dec 2019 04:00)  T(F): 98.1 (05 Dec 2019 08:00), Max: 98.4 (05 Dec 2019 04:00)  HR: 86 (05 Dec 2019 09:00) (78 - 88)  BP: 117/62 (05 Dec 2019 09:00) (117/62 - 174/72)  BP(mean): 89 (05 Dec 2019 09:00) (86 - 121)  RR: 32 (05 Dec 2019 09:00) (17 - 40)  SpO2: 94% (05 Dec 2019 09:00) (86% - 98%)    I&O's Summary    04 Dec 2019 07:01  -  05 Dec 2019 07:00  --------------------------------------------------------  IN: 1740 mL / OUT: 1100 mL / NET: 640 mL    05 Dec 2019 07:01  -  05 Dec 2019 10:03  --------------------------------------------------------  IN: 340 mL / OUT: 0 mL / NET: 340 mL      LABS:                        15.0   9.41  )-----------( 190      ( 05 Dec 2019 04:34 )             45.1     12-05    141  |  103  |  14  ----------------------------<  185<H>  4.3   |  20  |  1.3    Ca    9.2      05 Dec 2019 04:34  Phos  2.9     12-05  Mg     2.1     12-05    TPro  6.5  /  Alb  4.1  /  TBili  0.8  /  DBili  0.2  /  AST  35  /  ALT  44<H>  /  AlkPhos  39  12-05    PT/INR - ( 05 Dec 2019 04:34 )   PT: 13.10 sec;   INR: 1.14 ratio         PTT - ( 05 Dec 2019 04:34 )  PTT:67.4 sec    CAPILLARY BLOOD GLUCOSE      POCT Blood Glucose.: 170 mg/dL (05 Dec 2019 07:51)  POCT Blood Glucose.: 195 mg/dL (04 Dec 2019 21:24)  POCT Blood Glucose.: 200 mg/dL (04 Dec 2019 16:52)  POCT Blood Glucose.: 207 mg/dL (04 Dec 2019 11:57)        RADIOLOGY & ADDITIONAL TESTS:    Consultant(s) Notes Reviewed:  [x ] YES  [ ] NO    MEDICATIONS  (STANDING):  aMIOdarone    Tablet 200 milliGRAM(s) Oral two times a day  aspirin enteric coated 81 milliGRAM(s) Oral daily  atorvastatin 40 milliGRAM(s) Oral at bedtime  budesonide 160 MICROgram(s)/formoterol 4.5 MICROgram(s) Inhaler 2 Puff(s) Inhalation two times a day  chlorhexidine 4% Liquid 1 Application(s) Topical <User Schedule>  clopidogrel Tablet 75 milliGRAM(s) Oral daily  dextrose 5%. 1000 milliLiter(s) (50 mL/Hr) IV Continuous <Continuous>  dextrose 50% Injectable 12.5 Gram(s) IV Push once  dextrose 50% Injectable 25 Gram(s) IV Push once  dextrose 50% Injectable 25 Gram(s) IV Push once  fenofibrate Tablet 145 milliGRAM(s) Oral daily  heparin  Infusion 1900 Unit(s)/Hr (20 mL/Hr) IV Continuous <Continuous>  insulin glargine Injectable (LANTUS) 28 Unit(s) SubCutaneous at bedtime  insulin lispro (HumaLOG) corrective regimen sliding scale   SubCutaneous three times a day before meals  insulin lispro Injectable (HumaLOG) 10 Unit(s) SubCutaneous before breakfast  insulin lispro Injectable (HumaLOG) 10 Unit(s) SubCutaneous before lunch  insulin lispro Injectable (HumaLOG) 10 Unit(s) SubCutaneous before dinner  sacubitril 24 mG/valsartan 26 mG 1 Tablet(s) Oral two times a day  zolpidem 5 milliGRAM(s) Oral at bedtime    MEDICATIONS  (PRN):  dextrose 40% Gel 15 Gram(s) Oral once PRN Blood Glucose LESS THAN 70 milliGRAM(s)/deciliter  glucagon  Injectable 1 milliGRAM(s) IntraMuscular once PRN Glucose LESS THAN 70 milligrams/deciliter      PHYSICAL EXAM:   CONSTITUTIONAL: Well-developed; well-nourished; in no acute distress.   SKIN: warm, dry  HEAD: Normocephalic; atraumatic.  EYES: no conjunctival injection. EOMI.   ENT: No nasal discharge; airway clear.  NECK: Supple; non tender.  CARD: S1, S2 normal; no murmurs, gallops, or rubs. RRR  RESP: No wheezes, rales or rhonchi.  ABD: soft ntnd.   EXT: No LE edema.   NEURO: Alert, oriented, grossly unremarkable.      Care Discussed with Consultants Providers [ x] YES  [ ] NO      ASSESSMENT & PLAN    65 y/o M PMHx COPD not on home O2, CAD w/ stent (recent in Oct), HFrEF, HTN, DM II, DLD presented to the ED for chest pain. Patient is a current daily smoker of 3/4 of pack daily for > 20 years, last cigarette was on day prior to presentation. In the ED, Pt was found to be in new onset AFIB with RVR. he was given cardizem 5 mg iv push x 2 times. His BP then dropped to 80s systolic. He was given 1 L IVF which helped improve BP to 100s systolic but then again remained in 90s systolic.  Another 500 bolus given  Pt was seen by cardiology and recommended amiodarone bolus and infusion.    #New onset Afib  - c/w amiodarone PO 7 days 200mg BID (until 12/11/19) then 200 mg qd   - c/w heparin drip   - f/u PTT  - f/u TSH and LFT   - f/u EP     #H/o CAD s/p stent, chronic  Systolic CHF, aortic stenosis  - ECHO 12/3/19 - EF 30-35%  - c/w ASA, plavix, statin  - c/w Entresto  - planned for AICD/biventricular pacemaker- spoke with cardiologist Dr. Lamar, Dr. Fall on board   - CT surg/Dr. Li aware of patient, plan for procedure either tonight or tomorrow. Pt is NPO.     #?iliac dissection  - f/u vascular recs     #DM2  - monitor FS  - increase insulin     #Dyslipidemia  - c/w fenofibrate, statin    #COPD  - c/w albuterol, budesonide    # CKD stage 2 stable    # full code  # Disposition: home when stable Patient is a 64y old  Male who presents with a chief complaint of Atrial fibrillation new onset (04 Dec 2019 15:04)      INTERVAL HPI/OVERNIGHT EVENTS:  ICU Vital Signs Last 24 Hrs  T(C): 36.7 (05 Dec 2019 08:00), Max: 36.9 (05 Dec 2019 04:00)  T(F): 98.1 (05 Dec 2019 08:00), Max: 98.4 (05 Dec 2019 04:00)  HR: 86 (05 Dec 2019 09:00) (78 - 88)  BP: 117/62 (05 Dec 2019 09:00) (117/62 - 174/72)  BP(mean): 89 (05 Dec 2019 09:00) (86 - 121)  RR: 32 (05 Dec 2019 09:00) (17 - 40)  SpO2: 94% (05 Dec 2019 09:00) (86% - 98%)    I&O's Summary    04 Dec 2019 07:01  -  05 Dec 2019 07:00  --------------------------------------------------------  IN: 1740 mL / OUT: 1100 mL / NET: 640 mL    05 Dec 2019 07:01  -  05 Dec 2019 10:03  --------------------------------------------------------  IN: 340 mL / OUT: 0 mL / NET: 340 mL      LABS:                        15.0   9.41  )-----------( 190      ( 05 Dec 2019 04:34 )             45.1     12-05    141  |  103  |  14  ----------------------------<  185<H>  4.3   |  20  |  1.3    Ca    9.2      05 Dec 2019 04:34  Phos  2.9     12-05  Mg     2.1     12-05    TPro  6.5  /  Alb  4.1  /  TBili  0.8  /  DBili  0.2  /  AST  35  /  ALT  44<H>  /  AlkPhos  39  12-05    PT/INR - ( 05 Dec 2019 04:34 )   PT: 13.10 sec;   INR: 1.14 ratio         PTT - ( 05 Dec 2019 04:34 )  PTT:67.4 sec    CAPILLARY BLOOD GLUCOSE      POCT Blood Glucose.: 170 mg/dL (05 Dec 2019 07:51)  POCT Blood Glucose.: 195 mg/dL (04 Dec 2019 21:24)  POCT Blood Glucose.: 200 mg/dL (04 Dec 2019 16:52)  POCT Blood Glucose.: 207 mg/dL (04 Dec 2019 11:57)        RADIOLOGY & ADDITIONAL TESTS:    Consultant(s) Notes Reviewed:  [x ] YES  [ ] NO    MEDICATIONS  (STANDING):  aMIOdarone    Tablet 200 milliGRAM(s) Oral two times a day  aspirin enteric coated 81 milliGRAM(s) Oral daily  atorvastatin 40 milliGRAM(s) Oral at bedtime  budesonide 160 MICROgram(s)/formoterol 4.5 MICROgram(s) Inhaler 2 Puff(s) Inhalation two times a day  chlorhexidine 4% Liquid 1 Application(s) Topical <User Schedule>  clopidogrel Tablet 75 milliGRAM(s) Oral daily  dextrose 5%. 1000 milliLiter(s) (50 mL/Hr) IV Continuous <Continuous>  dextrose 50% Injectable 12.5 Gram(s) IV Push once  dextrose 50% Injectable 25 Gram(s) IV Push once  dextrose 50% Injectable 25 Gram(s) IV Push once  fenofibrate Tablet 145 milliGRAM(s) Oral daily  heparin  Infusion 1900 Unit(s)/Hr (20 mL/Hr) IV Continuous <Continuous>  insulin glargine Injectable (LANTUS) 28 Unit(s) SubCutaneous at bedtime  insulin lispro (HumaLOG) corrective regimen sliding scale   SubCutaneous three times a day before meals  insulin lispro Injectable (HumaLOG) 10 Unit(s) SubCutaneous before breakfast  insulin lispro Injectable (HumaLOG) 10 Unit(s) SubCutaneous before lunch  insulin lispro Injectable (HumaLOG) 10 Unit(s) SubCutaneous before dinner  sacubitril 24 mG/valsartan 26 mG 1 Tablet(s) Oral two times a day  zolpidem 5 milliGRAM(s) Oral at bedtime    MEDICATIONS  (PRN):  dextrose 40% Gel 15 Gram(s) Oral once PRN Blood Glucose LESS THAN 70 milliGRAM(s)/deciliter  glucagon  Injectable 1 milliGRAM(s) IntraMuscular once PRN Glucose LESS THAN 70 milligrams/deciliter      PHYSICAL EXAM:   CONSTITUTIONAL: Well-developed; well-nourished; in no acute distress.   SKIN: warm, dry  HEAD: Normocephalic; atraumatic.  EYES: no conjunctival injection. EOMI.   ENT: No nasal discharge; airway clear.  NECK: Supple; non tender.  CARD: S1, S2 normal; no murmurs, gallops, or rubs. RRR  RESP: No wheezes, rales or rhonchi.  ABD: soft ntnd.   EXT: No LE edema.   NEURO: Alert, oriented, grossly unremarkable.      Care Discussed with Consultants Providers [ x] YES  [ ] NO      ASSESSMENT & PLAN    63 y/o M PMHx COPD not on home O2, CAD w/ stent (recent in Oct), HFrEF, HTN, DM II, DLD presented to the ED for chest pain. Patient is a current daily smoker of 3/4 of pack daily for > 20 years, last cigarette was on day prior to presentation. In the ED, Pt was found to be in new onset AFIB with RVR. he was given cardizem 5 mg iv push x 2 times. His BP then dropped to 80s systolic. He was given 1 L IVF which helped improve BP to 100s systolic but then again remained in 90s systolic.  Another 500 bolus given  Pt was seen by cardiology and recommended amiodarone bolus and infusion.    #New onset Afib  - c/w amiodarone PO 7 days 200mg BID (until 12/11/19) then 200 mg qd   - c/w heparin drip   - f/u PTT  - f/u TSH and LFT   - f/u EP     #H/o CAD s/p stent, chronic  Systolic CHF, aortic stenosis  - ECHO 12/3/19 - EF 30-35%  - c/w ASA, plavix, statin  - c/w Entresto  - planned for AICD/biventricular pacemaker- spoke with cardiologist Dr. Lamar, Dr. Fall on board   - CT surg/Dr. Li aware of patient, plan for procedure on Monday 12/19     #?iliac dissection  - f/u vascular recs     #DM2  - monitor FS  - increase insulin     #Dyslipidemia  - c/w fenofibrate, statin    #COPD  - c/w albuterol, budesonide    # CKD stage 2 stable    # full code  # Disposition: home when stable

## 2019-12-05 NOTE — CONSULT NOTE ADULT - ASSESSMENT
Assessment:  Neto Frey is a 65 yo man, domiciled with with and two children, with extensive PMHx, no reported or documented psychiatric history, suicide attempts, who is currently admitted to the CCU for new onset Afib. Upon assessment, the pt presents with frustrated mood in the context of wanting to be discharged from the hospital to get to his family to provide them with money so they can buy food. His thought process is linear, his speech and behavior are organized, and he does not exhibit any signs or symptoms concerning for internal preoccupation. He currently vehemently denies SI/HI, intent or plan, he is future oriented, he is able to engage in safety planning, and is receptive to following up both medically and psychiatrically outpatient. Pt currently does not present as an imminent danger to himself or others at this time warranting emergent IPP admission. Rather, pt will benefit from psychiatric follow up for supportive psychotherapy to help him develop coping skills to help him manage his life stressors.     Plan:  - No acute psychiatric recommendations at this time  - Pt to follow up with [facility] outpatient psychiatry clinic; pt given information for [facility] located at [address] [place] [address], [phone] for appointments  - If pt is to develop any thoughts of self harm or harm to others, he is to go to nearest ED; pt aware and agreeable with safety plan    Attending note to follow

## 2019-12-05 NOTE — PROGRESS NOTE ADULT - ASSESSMENT
Cardiologist: Dr. Fall    Patient is a 64y old  Male who presents with a chief complaint of Atrial fibrillation new onset (02 Dec 2019 14:18). TSH normal.     Impression:  CHF (congestive heart failure), current EF 30-35%  New-onset a-fib=> Converted to sinus now    Recommendations:   - Plan for ICD on Monday with Dr. Jose  - Amiodarone  mg twice daily for 1 week for 1 week, then 200 mg daily (starts 12/11)  - Continue AC  if no contraindications  - Cont Entresto  - Ok to downgrade to tele  - Will follow Cardiologist: Dr. Fall    Patient is a 64y old  Male who presents with a chief complaint of Atrial fibrillation new onset (02 Dec 2019 14:18). TSH normal.     Impression:  CHF (congestive heart failure), current EF 30-35%  New-onset a-fib=> Converted to sinus now    Recommendations  tRANSFER TO TELEMETRY:   - Plan for ICD BIV  on Monday with Dr. Jose  - Amiodarone  mg twice daily for 1 week for 1 week, then 200 mg daily (starts 12/11)  - Continue AC  if no contraindications  - Cont Entresto  - Ok to downgrade to tele  - Will follow

## 2019-12-05 NOTE — PROGRESS NOTE ADULT - SUBJECTIVE AND OBJECTIVE BOX
Pt seen this AM in CCU and without symptoms. No chf on exam. Maintaining RSR. CT surgery to place AICD/ BIVPPM today or tomorrow. Entresto started and this replaces Valsartan. Pt will need Entresto or Xarelto for his recent afib episode once cleared by CT surgery to start anticoagulation. Would stop heparin. Possibly stop ASA when Xarelto or Eliquis started. Will discuss with Dr. Rosado interventional cardiology.

## 2019-12-10 DIAGNOSIS — Z88.2 ALLERGY STATUS TO SULFONAMIDES: ICD-10-CM

## 2019-12-10 DIAGNOSIS — N18.2 CHRONIC KIDNEY DISEASE, STAGE 2 (MILD): ICD-10-CM

## 2019-12-10 DIAGNOSIS — Z95.3 PRESENCE OF XENOGENIC HEART VALVE: ICD-10-CM

## 2019-12-10 DIAGNOSIS — I50.22 CHRONIC SYSTOLIC (CONGESTIVE) HEART FAILURE: ICD-10-CM

## 2019-12-10 DIAGNOSIS — I13.0 HYPERTENSIVE HEART AND CHRONIC KIDNEY DISEASE WITH HEART FAILURE AND STAGE 1 THROUGH STAGE 4 CHRONIC KIDNEY DISEASE, OR UNSPECIFIED CHRONIC KIDNEY DISEASE: ICD-10-CM

## 2019-12-10 DIAGNOSIS — Z95.5 PRESENCE OF CORONARY ANGIOPLASTY IMPLANT AND GRAFT: ICD-10-CM

## 2019-12-10 DIAGNOSIS — T46.1X5A ADVERSE EFFECT OF CALCIUM-CHANNEL BLOCKERS, INITIAL ENCOUNTER: ICD-10-CM

## 2019-12-10 DIAGNOSIS — I25.10 ATHEROSCLEROTIC HEART DISEASE OF NATIVE CORONARY ARTERY WITHOUT ANGINA PECTORIS: ICD-10-CM

## 2019-12-10 DIAGNOSIS — I77.72 DISSECTION OF ILIAC ARTERY: ICD-10-CM

## 2019-12-10 DIAGNOSIS — F17.210 NICOTINE DEPENDENCE, CIGARETTES, UNCOMPLICATED: ICD-10-CM

## 2019-12-10 DIAGNOSIS — R07.9 CHEST PAIN, UNSPECIFIED: ICD-10-CM

## 2019-12-10 DIAGNOSIS — I48.91 UNSPECIFIED ATRIAL FIBRILLATION: ICD-10-CM

## 2019-12-10 DIAGNOSIS — E11.22 TYPE 2 DIABETES MELLITUS WITH DIABETIC CHRONIC KIDNEY DISEASE: ICD-10-CM

## 2019-12-10 DIAGNOSIS — I95.2 HYPOTENSION DUE TO DRUGS: ICD-10-CM

## 2019-12-10 DIAGNOSIS — I25.5 ISCHEMIC CARDIOMYOPATHY: ICD-10-CM

## 2019-12-10 DIAGNOSIS — J44.9 CHRONIC OBSTRUCTIVE PULMONARY DISEASE, UNSPECIFIED: ICD-10-CM

## 2019-12-19 ENCOUNTER — APPOINTMENT (OUTPATIENT)
Dept: CARDIOLOGY | Facility: CLINIC | Age: 64
End: 2019-12-19
Payer: MEDICARE

## 2019-12-19 PROBLEM — E78.5 HYPERLIPIDEMIA, UNSPECIFIED: Chronic | Status: ACTIVE | Noted: 2019-12-02

## 2019-12-19 PROCEDURE — 99214 OFFICE O/P EST MOD 30 MIN: CPT

## 2019-12-19 PROCEDURE — 93000 ELECTROCARDIOGRAM COMPLETE: CPT

## 2019-12-23 ENCOUNTER — APPOINTMENT (OUTPATIENT)
Dept: CARDIOTHORACIC SURGERY | Facility: CLINIC | Age: 64
End: 2019-12-23
Payer: MEDICARE

## 2019-12-23 VITALS
WEIGHT: 205 LBS | OXYGEN SATURATION: 97 % | TEMPERATURE: 98.4 F | HEART RATE: 71 BPM | BODY MASS INDEX: 36.32 KG/M2 | DIASTOLIC BLOOD PRESSURE: 64 MMHG | HEIGHT: 63 IN | RESPIRATION RATE: 12 BRPM | SYSTOLIC BLOOD PRESSURE: 131 MMHG

## 2019-12-23 DIAGNOSIS — Z86.39 PERSONAL HISTORY OF OTHER ENDOCRINE, NUTRITIONAL AND METABOLIC DISEASE: ICD-10-CM

## 2019-12-23 PROCEDURE — 99204 OFFICE O/P NEW MOD 45 MIN: CPT

## 2019-12-23 NOTE — HISTORY OF PRESENT ILLNESS
[FreeTextEntry1] : Mr. Frey is a 65 yo male with PMH of HTN, DM type2, CAD s/p PCI, HFrEF,  COPD came to ED for chest pain. He was found to be in new onset AFIB with RVR. He started on amiodarone drip, his rhythm converted to sinus. He has HFrEF, Electrophysiology consult recommended AICD and CRT. HE arrives today for a consultation for a BIV device. EF 30%. Patient states he is SOB.

## 2019-12-23 NOTE — PHYSICAL EXAM
[General Appearance - Alert] : alert [General Appearance - In No Acute Distress] : in no acute distress [Auscultation Breath Sounds / Voice Sounds] : lungs were clear to auscultation bilaterally [Heart Rate And Rhythm] : heart rate was normal and rhythm regular [Heart Sounds] : normal S1 and S2 [Heart Sounds Gallop] : no gallops [Murmurs] : no murmurs [Heart Sounds Pericardial Friction Rub] : no pericardial rub [Bowel Sounds] : normal bowel sounds [Abdomen Soft] : soft [Abdomen Tenderness] : non-tender [Abdomen Mass (___ Cm)] : no abdominal mass palpated [Skin Turgor] : normal skin turgor [Skin Color & Pigmentation] : normal skin color and pigmentation [] : no rash [Deep Tendon Reflexes (DTR)] : deep tendon reflexes were 2+ and symmetric [No Focal Deficits] : no focal deficits [Sensation] : the sensory exam was normal to light touch and pinprick [Oriented To Time, Place, And Person] : oriented to person, place, and time [Impaired Insight] : insight and judgment were intact [Affect] : the affect was normal

## 2019-12-23 NOTE — ASSESSMENT
[FreeTextEntry1] : Mr. Frey is a 65 yo male with PMH of HTN, DM type2, CAD s/p PCI, HFrEF,  COPD came to ED for chest pain. He was found to be in new onset AFIB with RVR. He started on amiodarone drip, his rhythm converted to sinus. He has HFrEF, Electrophysiology consult recommended AICD and CRT. HE arrives today for a consultation for a BIV device. EF 30%. Patient states he is SOB. Patient educated on current plan. Hold Eliquis for 3 days.

## 2019-12-23 NOTE — DATA REVIEWED
[FreeTextEntry1] :  EXAM:  2-D ECHO (TTE) COMPLETE  \par \par \par PROCEDURE DATE:  2019  \par \par INTERPRETATION:  REPORT:  \par TRANSTHORACIC ECHOCARDIOGRAM REPORT\par \par  Patient Name:   MONIQUE LYONS Accession #: [id number]\par \par Date of Exam:        12/3/2019 10:38:29 AM\par Summary:\par  1. Left ventricular ejection fraction, by visual estimation, is 30 to \par 35%.\par  2. Severely decreased global left ventricular systolic function.\par  3. Multiple left ventricular regional wall motion abnormalities exist. \par See wall motion findings.\par  4. Elevated left atrial and left ventricular end-diastolic pressures.\par  5. Mildly increased LV wall thickness.\par  6. Spectral Doppler shows pseudonormal pattern of left ventricular \par myocardial filling (Grade II diastolic dysfunction).\par  7. Moderate mitral annular calcification.\par  8. Moderate aortic valve stenosis.\par  9. LA volume Index is 47.2 ml/m² ml/m2.\par 10. Mitral valve mean gradient is 1.9 mmHg consistent with normal mitral \par stenosis.\par 11. Peak transaortic gradient equals 44.3 mmHg, mean transaortic gradient \par equals 24.1 mmHg, the calculated aortic valve area equals 1.55 cm² by the \par continuity equation consistent with moderate aortic stenosis.\par \par PHYSICIAN INTERPRETATION:\par Left Ventricle: The left ventricular internal cavity size is mildly \par increased. Left ventricular wall thickness is mildly increased. Global LV \par systolic function was severely decreased. Left ventricular ejection \par fraction, by visual estimation, is 30 to 35%. Spectral Doppler shows \par pseudonormal pattern of left ventricular myocardial filling (Grade II \par diastolic dysfunction). Elevated left atrial and left ventricular \par end-diastolic pressures.\par  \par \par LV Wall Scoring:\par There is diffuse hypokinesis.\par \par Right Ventricle: Normal right ventricular size and function.\par Left Atrium: Moderately enlarged left atrium. LA volume Index is 47.2 \par ml/m² ml/m2.\par Right Atrium: Normal right atrial size.\par Pericardium: There is no evidence of pericardial effusion.\par Mitral Valve: Structurally normal mitral valve, with normal leaflet \par excursion. The mitral valve is normal in structure. There is moderate \par mitral annular calcification. Peak transmitral mean gradient equals 1.9 \par mmHg, calculated mitral valve area by pressure half time equals 5.23 cm² \par consistent with No evidence of mitral stenosis. No evidence of mitral \par valve regurgitation is seen. Mitral valve mean gradient is 1.9 mmHg \par consistent with normal mitral stenosis.\par Tricuspid Valve: Structurally normal tricuspid valve, with normal leaflet \par excursion. The tricuspid valve is normal in structure. Trivial tricuspid \par regurgitation is visualized.\par Aortic Valve: The aortic valve is trileaflet. Moderate aortic stenosis is \par present. Peak transaortic gradient equals 44.3 mmHg, mean transaortic \par gradient equals 24.1 mmHg, the calculated aortic valve area equals 1.55 \par cm² by the continuity equation consistent with moderate aortic stenosis. \par No evidence of aortic valve regurgitation is seen.\par Pulmonic Valve: Structurally normal pulmonic valve, with normal leaflet \par excursion. The pulmonic valve is normal. No indication of pulmonic valve \par regurgitation.\par Aorta: The aortic root and ascending aorta are structurally normal, with \par no evidence of dilitation.\par Pulmonary Artery: The main pulmonary artery is normal in size.\par Venous: The inferior vena cava is not well visualized.\par \par [facility]\par [address] [place] [address]\[place] [address]\par [phone]\par  \par MONIQUE LYONS\par Cardiac Catheterization -- Comprehensive Report\par MR number: [id number]\par : 1955\par Age: 64 years\par Gender: Male\par Account number: [id number]\par Height: 63 in\par Weight: 204.6 lb\par BSA: 1.95 m?\par Study date: 10/11/2019\par Cine ID:\par PCI ID: .[id number]\par Peripheral ID:\par  \par Interventional Cardiologist:  Eron Rosado MD\par Fellow:  Murray Spangler MD\par Circulator:  VITOR Bay\par Monitor:  Elssy Peter\par Fellow:  Ramiro Good\par  \par SUMMARY:\par  \par 1ST LESION INTERVENTIONS: A successful stent with balloon angioplasty was\par performed on the 90 % lesion in the proximal RCA. Following intervention\par there was an excellent angiographic appearance with a 0 % residual\par stenosis.\par  \par CORONARY CIRCULATION: There was 1-vessel coronary artery disease (RCA).\par Proximal RCA: There was a diffuse 90 % stenosis at a site with no prior\par intervention. There was ROBERT grade 3 flow through the vessel (brisk flow).\par This is a likely culprit for the patient's clinical presentation. An\par intervention was performed.\par  \par INDICATIONS: Angina/MI: unstable angina, CCS class II. Coronary artery\par disease: ischemic heart disease. Congestive heart failure with ischemic\par cardiomyopathy. Staged procedure.\par  \par HISTORY: The patient has hypertension, renal failure (not requiring\par dialysis), medication-treated dyslipidemia, and morbid obesity. PRIOR\par CARDIOVASCULAR PROCEDURES: Stent of the mid LAD.\par  \par PROCEDURES PERFORMED:\par  \par --  Bilateral coronary angiography.\par --  Coronary Stent Placement.\par --  Ballon Angioplasty.\par --  Intervention on proximal RCA: stent, balloon angioplasty.\par  \par PROCEDURE: The risks and alternatives of the procedures and conscious\par sedation were explained to the patient and informed consent was obtained.\par The patient was brought to the cath lab and placed on the table. The\par planned puncture sites were prepped and draped in the usual sterile\par fashion.\par  \par --  Right radial artery access. The puncture site was infiltrated with 4 ml\par 2 % lidocaine. The vessel was accessed using the modified Seldinger\par technique, a wire was threaded into the vessel, and a 6 Fr Sheath Prelude\par was advanced over the wire into the vessel.\par  \par --  Right and left coronary angiography. A JR4.0 Launcher catheter was\par advanced to the aorta and positioned in the vessel ostia under\par fluoroscopic guidance. For proper position, JL3.5 catheter(s) were also\par used. Angiography was performed in multiple projections using\par hand-injection of contrast.\par  \par LESION INTERVENTION: A successful stent with balloon angioplasty was\par performed on the 90 % lesion in the proximal RCA. Following intervention\par there was an excellent angiographic appearance with a 0 % residual\par stenosis. This was not a bifurcation lesion. This was an ACC/AHA "non-high\par risk" lesion for intervention. There was no evidence of the transient\par no-reflow phenomenon. There was ROBERT 3 flow before the procedure and ROBERT\par 3 flow after the procedure. There were no site complications.\par \par  \par CORONARY CIRCULATION: The coronary circulation is right dominant. There was\par 1-vessel coronary artery disease (RCA). Left main: The vessel was medium\par sized. Angiography showed minor luminal irregularities with no flow\par limiting lesions. LAD: The vessel was medium to large sized. There was one\par major diagonal branch. Mid LAD: patent prior placed stent. Distal LAD: The\par vessel was medium sized. Angiography showed mild atherosclerosis with no\par flow limiting lesions. Circumflex: The vessel was medium sized.\par Angiography showed mild atherosclerosis with no flow limiting lesions.\par Proximal RCA: There was a diffuse 90 % stenosis at a site with no prior\par intervention. There was ROBERT grade 3 flow through the vessel (brisk flow).\par This is a likely culprit for the patient's clinical presentation. An\par intervention was performed.\par  \par COMPLICATIONS: No complications occurred during the cath lab visit.\par  \par IMPRESSIONS: There is significant single vessel coronary artery disease.\par  \par RECOMMENDATIONS:\par Patient management should include aggressive medical therapy, close\par monitoring of BUN and creatinine, and aggressive risk factor modification.\par The patient should follow a low fat and low calorie diet.\par Recommend PCI immediately following this diagnostic procedure.\par  \par DISPOSITION: The patient left the catheterization laboratory in stable\par condition. The patient will be discharged on the day of the procedure,\par following bed rest and subsequent ambulation, provided the recovery\par parameters are appropriate. The patient has been instructed to call the\par procedural cardiologist immediately if symptoms recur, or should there be\par any problems with the puncture site, such as bleeding, swelling, pain or\par signs of infection. The patient has been instructed to follow up with the\par procedural cardiologist in the near future.\par

## 2019-12-26 ENCOUNTER — OUTPATIENT (OUTPATIENT)
Dept: OUTPATIENT SERVICES | Facility: HOSPITAL | Age: 64
LOS: 1 days | Discharge: HOME | End: 2019-12-26
Payer: MEDICARE

## 2019-12-26 VITALS
OXYGEN SATURATION: 99 % | WEIGHT: 211.64 LBS | TEMPERATURE: 95 F | RESPIRATION RATE: 16 BRPM | HEART RATE: 68 BPM | SYSTOLIC BLOOD PRESSURE: 99 MMHG | DIASTOLIC BLOOD PRESSURE: 55 MMHG | HEIGHT: 63 IN

## 2019-12-26 DIAGNOSIS — Z95.5 PRESENCE OF CORONARY ANGIOPLASTY IMPLANT AND GRAFT: Chronic | ICD-10-CM

## 2019-12-26 DIAGNOSIS — I42.9 CARDIOMYOPATHY, UNSPECIFIED: ICD-10-CM

## 2019-12-26 DIAGNOSIS — Z01.818 ENCOUNTER FOR OTHER PREPROCEDURAL EXAMINATION: ICD-10-CM

## 2019-12-26 DIAGNOSIS — Z98.890 OTHER SPECIFIED POSTPROCEDURAL STATES: Chronic | ICD-10-CM

## 2019-12-26 DIAGNOSIS — Z95.2 PRESENCE OF PROSTHETIC HEART VALVE: Chronic | ICD-10-CM

## 2019-12-26 LAB
ALBUMIN SERPL ELPH-MCNC: 4.3 G/DL — SIGNIFICANT CHANGE UP (ref 3.5–5.2)
ALP SERPL-CCNC: 35 U/L — SIGNIFICANT CHANGE UP (ref 30–115)
ALT FLD-CCNC: 44 U/L — HIGH (ref 0–41)
ANION GAP SERPL CALC-SCNC: 16 MMOL/L — HIGH (ref 7–14)
APPEARANCE UR: CLEAR — SIGNIFICANT CHANGE UP
APTT BLD: 33.7 SEC — SIGNIFICANT CHANGE UP (ref 27–39.2)
AST SERPL-CCNC: 26 U/L — SIGNIFICANT CHANGE UP (ref 0–41)
BACTERIA # UR AUTO: NEGATIVE — SIGNIFICANT CHANGE UP
BASOPHILS # BLD AUTO: 0.1 K/UL — SIGNIFICANT CHANGE UP (ref 0–0.2)
BASOPHILS NFR BLD AUTO: 0.9 % — SIGNIFICANT CHANGE UP (ref 0–1)
BILIRUB SERPL-MCNC: 0.5 MG/DL — SIGNIFICANT CHANGE UP (ref 0.2–1.2)
BILIRUB UR-MCNC: NEGATIVE — SIGNIFICANT CHANGE UP
BLD GP AB SCN SERPL QL: SIGNIFICANT CHANGE UP
BUN SERPL-MCNC: 25 MG/DL — HIGH (ref 10–20)
CALCIUM SERPL-MCNC: 10 MG/DL — SIGNIFICANT CHANGE UP (ref 8.5–10.1)
CHLORIDE SERPL-SCNC: 99 MMOL/L — SIGNIFICANT CHANGE UP (ref 98–110)
CO2 SERPL-SCNC: 25 MMOL/L — SIGNIFICANT CHANGE UP (ref 17–32)
COLOR SPEC: SIGNIFICANT CHANGE UP
CREAT SERPL-MCNC: 1.6 MG/DL — HIGH (ref 0.7–1.5)
DIFF PNL FLD: NEGATIVE — SIGNIFICANT CHANGE UP
EOSINOPHIL # BLD AUTO: 0.38 K/UL — SIGNIFICANT CHANGE UP (ref 0–0.7)
EOSINOPHIL NFR BLD AUTO: 3.3 % — SIGNIFICANT CHANGE UP (ref 0–8)
EPI CELLS # UR: 0 /HPF — SIGNIFICANT CHANGE UP (ref 0–5)
GLUCOSE SERPL-MCNC: 195 MG/DL — HIGH (ref 70–99)
GLUCOSE UR QL: NEGATIVE — SIGNIFICANT CHANGE UP
HCT VFR BLD CALC: 50.3 % — SIGNIFICANT CHANGE UP (ref 42–52)
HGB BLD-MCNC: 16.8 G/DL — SIGNIFICANT CHANGE UP (ref 14–18)
HYALINE CASTS # UR AUTO: 0 /LPF — SIGNIFICANT CHANGE UP (ref 0–7)
IMM GRANULOCYTES NFR BLD AUTO: 0.6 % — HIGH (ref 0.1–0.3)
INR BLD: 1.28 RATIO — SIGNIFICANT CHANGE UP (ref 0.65–1.3)
KETONES UR-MCNC: NEGATIVE — SIGNIFICANT CHANGE UP
LEUKOCYTE ESTERASE UR-ACNC: NEGATIVE — SIGNIFICANT CHANGE UP
LYMPHOCYTES # BLD AUTO: 1.92 K/UL — SIGNIFICANT CHANGE UP (ref 1.2–3.4)
LYMPHOCYTES # BLD AUTO: 16.8 % — LOW (ref 20.5–51.1)
MCHC RBC-ENTMCNC: 29.6 PG — SIGNIFICANT CHANGE UP (ref 27–31)
MCHC RBC-ENTMCNC: 33.4 G/DL — SIGNIFICANT CHANGE UP (ref 32–37)
MCV RBC AUTO: 88.6 FL — SIGNIFICANT CHANGE UP (ref 80–94)
MONOCYTES # BLD AUTO: 0.7 K/UL — HIGH (ref 0.1–0.6)
MONOCYTES NFR BLD AUTO: 6.1 % — SIGNIFICANT CHANGE UP (ref 1.7–9.3)
NEUTROPHILS # BLD AUTO: 8.25 K/UL — HIGH (ref 1.4–6.5)
NEUTROPHILS NFR BLD AUTO: 72.3 % — SIGNIFICANT CHANGE UP (ref 42.2–75.2)
NITRITE UR-MCNC: NEGATIVE — SIGNIFICANT CHANGE UP
NRBC # BLD: 0 /100 WBCS — SIGNIFICANT CHANGE UP (ref 0–0)
PH UR: 6.5 — SIGNIFICANT CHANGE UP (ref 5–8)
PLATELET # BLD AUTO: 241 K/UL — SIGNIFICANT CHANGE UP (ref 130–400)
POTASSIUM SERPL-MCNC: 4 MMOL/L — SIGNIFICANT CHANGE UP (ref 3.5–5)
POTASSIUM SERPL-SCNC: 4 MMOL/L — SIGNIFICANT CHANGE UP (ref 3.5–5)
PROT SERPL-MCNC: 6.8 G/DL — SIGNIFICANT CHANGE UP (ref 6–8)
PROT UR-MCNC: ABNORMAL
PROTHROM AB SERPL-ACNC: 14.7 SEC — HIGH (ref 9.95–12.87)
RBC # BLD: 5.68 M/UL — SIGNIFICANT CHANGE UP (ref 4.7–6.1)
RBC # FLD: 13.1 % — SIGNIFICANT CHANGE UP (ref 11.5–14.5)
RBC CASTS # UR COMP ASSIST: 0 /HPF — SIGNIFICANT CHANGE UP (ref 0–4)
SODIUM SERPL-SCNC: 140 MMOL/L — SIGNIFICANT CHANGE UP (ref 135–146)
SP GR SPEC: 1.01 — LOW (ref 1.01–1.02)
UROBILINOGEN FLD QL: SIGNIFICANT CHANGE UP
WBC # BLD: 11.42 K/UL — HIGH (ref 4.8–10.8)
WBC # FLD AUTO: 11.42 K/UL — HIGH (ref 4.8–10.8)
WBC UR QL: 0 /HPF — SIGNIFICANT CHANGE UP (ref 0–5)

## 2019-12-26 PROCEDURE — 71046 X-RAY EXAM CHEST 2 VIEWS: CPT | Mod: 26

## 2019-12-26 PROCEDURE — 93010 ELECTROCARDIOGRAM REPORT: CPT

## 2019-12-26 RX ORDER — INSULIN DEGLUDEC 100 U/ML
120 INJECTION, SOLUTION SUBCUTANEOUS
Qty: 0 | Refills: 0 | DISCHARGE

## 2019-12-26 RX ORDER — INSULIN DEGLUDEC 100 U/ML
0 INJECTION, SOLUTION SUBCUTANEOUS
Qty: 27 | Refills: 0 | DISCHARGE

## 2019-12-26 RX ORDER — DULAGLUTIDE 4.5 MG/.5ML
0 INJECTION, SOLUTION SUBCUTANEOUS
Qty: 6 | Refills: 0 | DISCHARGE

## 2019-12-26 NOTE — H&P PST ADULT - NSICDXPASTMEDICALHX_GEN_ALL_CORE_FT
PAST MEDICAL HISTORY:  Afib     CAD (coronary artery disease)     CHF (congestive heart failure)     Chronic obstructive pulmonary disease (COPD)     Diabetes     Dyslipidemia     GERD (gastroesophageal reflux disease)     HTN (hypertension)     Stented coronary artery

## 2019-12-26 NOTE — H&P PST ADULT - NSICDXPASTSURGICALHX_GEN_ALL_CORE_FT
PAST SURGICAL HISTORY:  H/O heart artery stent     Heart valve replaced aortic    History of Nissen fundoplication

## 2019-12-26 NOTE — H&P PST ADULT - REASON FOR ADMISSION
65 yo male presents with hx cad, chf& afib, "I need a icd"; pt scheduled for icd  denies chest pain, palpitations, shortness of breath, dyspnea, or dysuria. exercise tolerance:1-2 blocks/ flights of stairs w/o sob

## 2019-12-27 LAB
ESTIMATED AVERAGE GLUCOSE: >398 MG/DL — HIGH (ref 68–114)
HBA1C BLD-MCNC: >15.5 % — HIGH (ref 4–5.6)

## 2019-12-30 ENCOUNTER — APPOINTMENT (OUTPATIENT)
Dept: CARDIOLOGY | Facility: CLINIC | Age: 64
End: 2019-12-30
Payer: MEDICARE

## 2019-12-30 PROBLEM — I48.91 UNSPECIFIED ATRIAL FIBRILLATION: Chronic | Status: ACTIVE | Noted: 2019-12-26

## 2019-12-30 PROBLEM — K21.9 GASTRO-ESOPHAGEAL REFLUX DISEASE WITHOUT ESOPHAGITIS: Chronic | Status: ACTIVE | Noted: 2019-12-26

## 2019-12-30 PROCEDURE — 99215 OFFICE O/P EST HI 40 MIN: CPT

## 2019-12-30 PROCEDURE — 93000 ELECTROCARDIOGRAM COMPLETE: CPT

## 2020-01-07 ENCOUNTER — APPOINTMENT (OUTPATIENT)
Dept: CARDIOLOGY | Facility: CLINIC | Age: 65
End: 2020-01-07
Payer: MEDICARE

## 2020-01-07 PROCEDURE — 93306 TTE W/DOPPLER COMPLETE: CPT

## 2020-01-14 NOTE — ASU PATIENT PROFILE, ADULT - FALL HARM RISK CONCLUSION
Patient complaining of localized chest pain with pain score 5/10; non-radiating, no other associated symptoms; VSS-WNL; tylenol prn given; MD Mcdowell made aware with new orders received   Universal Safety Interventions

## 2020-01-23 ENCOUNTER — APPOINTMENT (OUTPATIENT)
Dept: CARDIOLOGY | Facility: CLINIC | Age: 65
End: 2020-01-23

## 2020-01-28 ENCOUNTER — APPOINTMENT (OUTPATIENT)
Dept: CARDIOTHORACIC SURGERY | Facility: CLINIC | Age: 65
End: 2020-01-28
Payer: MEDICARE

## 2020-01-28 VITALS
WEIGHT: 189 LBS | RESPIRATION RATE: 13 BRPM | HEIGHT: 63 IN | SYSTOLIC BLOOD PRESSURE: 136 MMHG | DIASTOLIC BLOOD PRESSURE: 96 MMHG | OXYGEN SATURATION: 95 % | HEART RATE: 72 BPM | BODY MASS INDEX: 33.49 KG/M2 | TEMPERATURE: 98 F

## 2020-01-28 PROCEDURE — 99213 OFFICE O/P EST LOW 20 MIN: CPT

## 2020-01-28 NOTE — PHYSICAL EXAM
[Auscultation Breath Sounds / Voice Sounds] : lungs were clear to auscultation bilaterally [Heart Sounds] : normal S1 and S2 [Heart Rate And Rhythm] : heart rate was normal and rhythm regular [Heart Sounds Gallop] : no gallops [Murmurs] : no murmurs [Bowel Sounds] : normal bowel sounds [Heart Sounds Pericardial Friction Rub] : no pericardial rub [Abdomen Soft] : soft [Abdomen Tenderness] : non-tender [Skin Color & Pigmentation] : normal skin color and pigmentation [Abdomen Mass (___ Cm)] : no abdominal mass palpated [Skin Turgor] : normal skin turgor [] : no rash [Deep Tendon Reflexes (DTR)] : deep tendon reflexes were 2+ and symmetric [No Focal Deficits] : no focal deficits [Sensation] : the sensory exam was normal to light touch and pinprick [Oriented To Time, Place, And Person] : oriented to person, place, and time [Impaired Insight] : insight and judgment were intact [Affect] : the affect was normal [General Appearance - Alert] : alert [General Appearance - In No Acute Distress] : in no acute distress

## 2020-01-31 LAB
ANION GAP SERPL CALC-SCNC: 14 MMOL/L
BASOPHILS # BLD AUTO: 0.1 K/UL
BASOPHILS NFR BLD AUTO: 1 %
BUN SERPL-MCNC: 19 MG/DL
CALCIUM SERPL-MCNC: 10.4 MG/DL
CHLORIDE SERPL-SCNC: 101 MMOL/L
CO2 SERPL-SCNC: 26 MMOL/L
CREAT SERPL-MCNC: 1.4 MG/DL
EOSINOPHIL # BLD AUTO: 0.42 K/UL
EOSINOPHIL NFR BLD AUTO: 4 %
GLUCOSE SERPL-MCNC: 158 MG/DL
HCT VFR BLD CALC: 52.9 %
HGB BLD-MCNC: 18 G/DL
IMM GRANULOCYTES NFR BLD AUTO: 0.7 %
INR PPP: 1.24 RATIO
LYMPHOCYTES # BLD AUTO: 2.69 K/UL
LYMPHOCYTES NFR BLD AUTO: 25.9 %
MAN DIFF?: NORMAL
MCHC RBC-ENTMCNC: 29.7 PG
MCHC RBC-ENTMCNC: 34 G/DL
MCV RBC AUTO: 87.1 FL
MONOCYTES # BLD AUTO: 0.72 K/UL
MONOCYTES NFR BLD AUTO: 6.9 %
NEUTROPHILS # BLD AUTO: 6.38 K/UL
NEUTROPHILS NFR BLD AUTO: 61.5 %
PLATELET # BLD AUTO: 197 K/UL
POTASSIUM SERPL-SCNC: 4.3 MMOL/L
PT BLD: 14.2 SEC
RBC # BLD: 6.07 M/UL
RBC # FLD: 12.2 %
SODIUM SERPL-SCNC: 141 MMOL/L
WBC # FLD AUTO: 10.38 K/UL

## 2020-01-31 NOTE — DATA REVIEWED
[FreeTextEntry1] :  EXAM:  2-D ECHO (TTE) COMPLETE  \par \par \par PROCEDURE DATE:  2019  \par \par INTERPRETATION:  REPORT:  \par TRANSTHORACIC ECHOCARDIOGRAM REPORT\par \par  Patient Name:   MONIQUE LYONS Accession #: [id number]\par \par Date of Exam:        12/3/2019 10:38:29 AM\par Summary:\par  1. Left ventricular ejection fraction, by visual estimation, is 30 to \par 35%.\par  2. Severely decreased global left ventricular systolic function.\par  3. Multiple left ventricular regional wall motion abnormalities exist. \par See wall motion findings.\par  4. Elevated left atrial and left ventricular end-diastolic pressures.\par  5. Mildly increased LV wall thickness.\par  6. Spectral Doppler shows pseudonormal pattern of left ventricular \par myocardial filling (Grade II diastolic dysfunction).\par  7. Moderate mitral annular calcification.\par  8. Moderate aortic valve stenosis.\par  9. LA volume Index is 47.2 ml/m² ml/m2.\par 10. Mitral valve mean gradient is 1.9 mmHg consistent with normal mitral \par stenosis.\par 11. Peak transaortic gradient equals 44.3 mmHg, mean transaortic gradient \par equals 24.1 mmHg, the calculated aortic valve area equals 1.55 cm² by the \par continuity equation consistent with moderate aortic stenosis.\par \par PHYSICIAN INTERPRETATION:\par Left Ventricle: The left ventricular internal cavity size is mildly \par increased. Left ventricular wall thickness is mildly increased. Global LV \par systolic function was severely decreased. Left ventricular ejection \par fraction, by visual estimation, is 30 to 35%. Spectral Doppler shows \par pseudonormal pattern of left ventricular myocardial filling (Grade II \par diastolic dysfunction). Elevated left atrial and left ventricular \par end-diastolic pressures.\par  \par \par LV Wall Scoring:\par There is diffuse hypokinesis.\par \par Right Ventricle: Normal right ventricular size and function.\par Left Atrium: Moderately enlarged left atrium. LA volume Index is 47.2 \par ml/m² ml/m2.\par Right Atrium: Normal right atrial size.\par Pericardium: There is no evidence of pericardial effusion.\par Mitral Valve: Structurally normal mitral valve, with normal leaflet \par excursion. The mitral valve is normal in structure. There is moderate \par mitral annular calcification. Peak transmitral mean gradient equals 1.9 \par mmHg, calculated mitral valve area by pressure half time equals 5.23 cm² \par consistent with No evidence of mitral stenosis. No evidence of mitral \par valve regurgitation is seen. Mitral valve mean gradient is 1.9 mmHg \par consistent with normal mitral stenosis.\par Tricuspid Valve: Structurally normal tricuspid valve, with normal leaflet \par excursion. The tricuspid valve is normal in structure. Trivial tricuspid \par regurgitation is visualized.\par Aortic Valve: The aortic valve is trileaflet. Moderate aortic stenosis is \par present. Peak transaortic gradient equals 44.3 mmHg, mean transaortic \par gradient equals 24.1 mmHg, the calculated aortic valve area equals 1.55 \par cm² by the continuity equation consistent with moderate aortic stenosis. \par No evidence of aortic valve regurgitation is seen.\par Pulmonic Valve: Structurally normal pulmonic valve, with normal leaflet \par excursion. The pulmonic valve is normal. No indication of pulmonic valve \par regurgitation.\par Aorta: The aortic root and ascending aorta are structurally normal, with \par no evidence of dilitation.\par Pulmonary Artery: The main pulmonary artery is normal in size.\par Venous: The inferior vena cava is not well visualized.\par \par [facility]\par [address] [place] [address]\[place] [address]\par [phone]\par  \par MONIQUE LYONS\par Cardiac Catheterization -- Comprehensive Report\par MR number: [id number]\par : 1955\par Age: 64 years\par Gender: Male\par Account number: [id number]\par Height: 63 in\par Weight: 204.6 lb\par BSA: 1.95 m?\par Study date: 10/11/2019\par Cine ID:\par PCI ID: .[id number]\par Peripheral ID:\par  \par Interventional Cardiologist:  Eron Rosado MD\par Fellow:  Murray Spangler MD\par Circulator:  VITOR Bay\par Monitor:  Elssy Peter\par Fellow:  Ramiro Good\par  \par SUMMARY:\par  \par 1ST LESION INTERVENTIONS: A successful stent with balloon angioplasty was\par performed on the 90 % lesion in the proximal RCA. Following intervention\par there was an excellent angiographic appearance with a 0 % residual\par stenosis.\par  \par CORONARY CIRCULATION: There was 1-vessel coronary artery disease (RCA).\par Proximal RCA: There was a diffuse 90 % stenosis at a site with no prior\par intervention. There was ROBERT grade 3 flow through the vessel (brisk flow).\par This is a likely culprit for the patient's clinical presentation. An\par intervention was performed.\par  \par INDICATIONS: Angina/MI: unstable angina, CCS class II. Coronary artery\par disease: ischemic heart disease. Congestive heart failure with ischemic\par cardiomyopathy. Staged procedure.\par  \par HISTORY: The patient has hypertension, renal failure (not requiring\par dialysis), medication-treated dyslipidemia, and morbid obesity. PRIOR\par CARDIOVASCULAR PROCEDURES: Stent of the mid LAD.\par  \par PROCEDURES PERFORMED:\par  \par --  Bilateral coronary angiography.\par --  Coronary Stent Placement.\par --  Ballon Angioplasty.\par --  Intervention on proximal RCA: stent, balloon angioplasty.\par  \par PROCEDURE: The risks and alternatives of the procedures and conscious\par sedation were explained to the patient and informed consent was obtained.\par The patient was brought to the cath lab and placed on the table. The\par planned puncture sites were prepped and draped in the usual sterile\par fashion.\par  \par --  Right radial artery access. The puncture site was infiltrated with 4 ml\par 2 % lidocaine. The vessel was accessed using the modified Seldinger\par technique, a wire was threaded into the vessel, and a 6 Fr Sheath Prelude\par was advanced over the wire into the vessel.\par  \par --  Right and left coronary angiography. A JR4.0 Launcher catheter was\par advanced to the aorta and positioned in the vessel ostia under\par fluoroscopic guidance. For proper position, JL3.5 catheter(s) were also\par used. Angiography was performed in multiple projections using\par hand-injection of contrast.\par  \par LESION INTERVENTION: A successful stent with balloon angioplasty was\par performed on the 90 % lesion in the proximal RCA. Following intervention\par there was an excellent angiographic appearance with a 0 % residual\par stenosis. This was not a bifurcation lesion. This was an ACC/AHA "non-high\par risk" lesion for intervention. There was no evidence of the transient\par no-reflow phenomenon. There was ROBERT 3 flow before the procedure and ROBERT\par 3 flow after the procedure. There were no site complications.\par \par  \par CORONARY CIRCULATION: The coronary circulation is right dominant. There was\par 1-vessel coronary artery disease (RCA). Left main: The vessel was medium\par sized. Angiography showed minor luminal irregularities with no flow\par limiting lesions. LAD: The vessel was medium to large sized. There was one\par major diagonal branch. Mid LAD: patent prior placed stent. Distal LAD: The\par vessel was medium sized. Angiography showed mild atherosclerosis with no\par flow limiting lesions. Circumflex: The vessel was medium sized.\par Angiography showed mild atherosclerosis with no flow limiting lesions.\par Proximal RCA: There was a diffuse 90 % stenosis at a site with no prior\par intervention. There was ROBERT grade 3 flow through the vessel (brisk flow).\par This is a likely culprit for the patient's clinical presentation. An\par intervention was performed.\par  \par COMPLICATIONS: No complications occurred during the cath lab visit.\par  \par IMPRESSIONS: There is significant single vessel coronary artery disease.\par  \par RECOMMENDATIONS:\par Patient management should include aggressive medical therapy, close\par monitoring of BUN and creatinine, and aggressive risk factor modification.\par The patient should follow a low fat and low calorie diet.\par Recommend PCI immediately following this diagnostic procedure.\par  \par DISPOSITION: The patient left the catheterization laboratory in stable\par condition. The patient will be discharged on the day of the procedure,\par following bed rest and subsequent ambulation, provided the recovery\par parameters are appropriate. The patient has been instructed to call the\par procedural cardiologist immediately if symptoms recur, or should there be\par any problems with the puncture site, such as bleeding, swelling, pain or\par signs of infection. The patient has been instructed to follow up with the\par procedural cardiologist in the near future.\par

## 2020-01-31 NOTE — HISTORY OF PRESENT ILLNESS
[Dyslipidemia] : Dyslipidemia [Diabetes Mellitus] : Diabetes Mellitus [FreeTextEntry1] : Mr. Frey is a 65 yo male with PMH of HTN, DM type2, CAD s/p PCI, HFrEF,  COPD came to ED for chest pain. He was found to be in new onset AFIB with RVR. He started on amiodarone drip, his rhythm converted to sinus. He has HFrEF, Electrophysiology consult recommended AICD and CRT. HE arrives today for a consultation for a BIV device. EF 30%. Patient states he is SOB, but is improved after cardiac catheterization on 10/2/19. \par \par His healthcare team is as follows:\par PMD: Martin Shaver\par Cardio: Khalif Lamar\par EPS: Juan David\par

## 2020-01-31 NOTE — REASON FOR VISIT
[Follow-Up: _____] : a [unfilled] follow-up visit [FreeTextEntry1] : MONIQUE LYONS is a 64 year old male being seen for a consultation visit, Evaluation of AICD.

## 2020-01-31 NOTE — ASSESSMENT
[FreeTextEntry1] : Mr. Frey is a 65 yo male with PMH of HTN, DM type2, CAD s/p PCI, HFrEF,  COPD came to ED for chest pain. He was found to be in new onset AFIB with RVR. He started on amiodarone drip, his rhythm converted to sinus. He has HFrEF, Electrophysiology consult recommended AICD and CRT. HE arrives today for a consultation for a BIV device. EF 30%. Patient states he is SOB, but is improved after cardiac catheterization on 10/2/19. Repeat echo done 1/2020 demonstrated an EF of 30-35%. At present he will be scheduled for Implantation of biventricular defibrillator possible thoracotomy ([employer] device) on 2/3/20.

## 2020-02-04 VITALS
HEART RATE: 70 BPM | DIASTOLIC BLOOD PRESSURE: 60 MMHG | WEIGHT: 209 LBS | HEIGHT: 63 IN | RESPIRATION RATE: 18 BRPM | TEMPERATURE: 98 F | OXYGEN SATURATION: 98 % | SYSTOLIC BLOOD PRESSURE: 102 MMHG

## 2020-02-05 ENCOUNTER — INPATIENT (INPATIENT)
Facility: HOSPITAL | Age: 65
LOS: 0 days | Discharge: HOME | End: 2020-02-06
Attending: THORACIC SURGERY (CARDIOTHORACIC VASCULAR SURGERY) | Admitting: THORACIC SURGERY (CARDIOTHORACIC VASCULAR SURGERY)
Payer: MEDICARE

## 2020-02-05 ENCOUNTER — TRANSCRIPTION ENCOUNTER (OUTPATIENT)
Age: 65
End: 2020-02-05

## 2020-02-05 DIAGNOSIS — Z95.2 PRESENCE OF PROSTHETIC HEART VALVE: Chronic | ICD-10-CM

## 2020-02-05 DIAGNOSIS — Z95.5 PRESENCE OF CORONARY ANGIOPLASTY IMPLANT AND GRAFT: Chronic | ICD-10-CM

## 2020-02-05 DIAGNOSIS — Z88.2 ALLERGY STATUS TO SULFONAMIDES: ICD-10-CM

## 2020-02-05 DIAGNOSIS — Z98.890 OTHER SPECIFIED POSTPROCEDURAL STATES: Chronic | ICD-10-CM

## 2020-02-05 LAB — GLUCOSE BLDC GLUCOMTR-MCNC: 118 MG/DL — HIGH (ref 70–99)

## 2020-02-05 PROCEDURE — 33249 INSJ/RPLCMT DEFIB W/LEAD(S): CPT

## 2020-02-05 PROCEDURE — 71045 X-RAY EXAM CHEST 1 VIEW: CPT | Mod: 26

## 2020-02-05 PROCEDURE — 33225 L VENTRIC PACING LEAD ADD-ON: CPT

## 2020-02-05 RX ORDER — AMIODARONE HYDROCHLORIDE 400 MG/1
200 TABLET ORAL DAILY
Refills: 0 | Status: DISCONTINUED | OUTPATIENT
Start: 2020-02-05 | End: 2020-02-06

## 2020-02-05 RX ORDER — ACETAMINOPHEN 500 MG
1000 TABLET ORAL ONCE
Refills: 0 | Status: COMPLETED | OUTPATIENT
Start: 2020-02-05 | End: 2020-02-05

## 2020-02-05 RX ORDER — DEXTROSE 50 % IN WATER 50 %
25 SYRINGE (ML) INTRAVENOUS ONCE
Refills: 0 | Status: DISCONTINUED | OUTPATIENT
Start: 2020-02-05 | End: 2020-02-06

## 2020-02-05 RX ORDER — METOPROLOL TARTRATE 50 MG
50 TABLET ORAL
Refills: 0 | Status: DISCONTINUED | OUTPATIENT
Start: 2020-02-06 | End: 2020-02-06

## 2020-02-05 RX ORDER — SODIUM CHLORIDE 9 MG/ML
3 INJECTION INTRAMUSCULAR; INTRAVENOUS; SUBCUTANEOUS EVERY 8 HOURS
Refills: 0 | Status: DISCONTINUED | OUTPATIENT
Start: 2020-02-05 | End: 2020-02-06

## 2020-02-05 RX ORDER — IPRATROPIUM/ALBUTEROL SULFATE 18-103MCG
3 AEROSOL WITH ADAPTER (GRAM) INHALATION EVERY 6 HOURS
Refills: 0 | Status: DISCONTINUED | OUTPATIENT
Start: 2020-02-05 | End: 2020-02-06

## 2020-02-05 RX ORDER — INSULIN LISPRO 100/ML
VIAL (ML) SUBCUTANEOUS
Refills: 0 | Status: DISCONTINUED | OUTPATIENT
Start: 2020-02-05 | End: 2020-02-06

## 2020-02-05 RX ORDER — FUROSEMIDE 40 MG
40 TABLET ORAL DAILY
Refills: 0 | Status: DISCONTINUED | OUTPATIENT
Start: 2020-02-05 | End: 2020-02-06

## 2020-02-05 RX ORDER — DEXTROSE 50 % IN WATER 50 %
12.5 SYRINGE (ML) INTRAVENOUS ONCE
Refills: 0 | Status: DISCONTINUED | OUTPATIENT
Start: 2020-02-05 | End: 2020-02-06

## 2020-02-05 RX ORDER — ASPIRIN/CALCIUM CARB/MAGNESIUM 324 MG
81 TABLET ORAL DAILY
Refills: 0 | Status: DISCONTINUED | OUTPATIENT
Start: 2020-02-06 | End: 2020-02-06

## 2020-02-05 RX ORDER — SACUBITRIL AND VALSARTAN 24; 26 MG/1; MG/1
1 TABLET, FILM COATED ORAL
Refills: 0 | Status: DISCONTINUED | OUTPATIENT
Start: 2020-02-05 | End: 2020-02-06

## 2020-02-05 RX ORDER — GLUCAGON INJECTION, SOLUTION 0.5 MG/.1ML
1 INJECTION, SOLUTION SUBCUTANEOUS ONCE
Refills: 0 | Status: DISCONTINUED | OUTPATIENT
Start: 2020-02-05 | End: 2020-02-06

## 2020-02-05 RX ORDER — SODIUM CHLORIDE 9 MG/ML
1000 INJECTION, SOLUTION INTRAVENOUS
Refills: 0 | Status: DISCONTINUED | OUTPATIENT
Start: 2020-02-05 | End: 2020-02-06

## 2020-02-05 RX ORDER — ATORVASTATIN CALCIUM 80 MG/1
40 TABLET, FILM COATED ORAL AT BEDTIME
Refills: 0 | Status: DISCONTINUED | OUTPATIENT
Start: 2020-02-05 | End: 2020-02-06

## 2020-02-05 RX ORDER — INSULIN GLARGINE 100 [IU]/ML
50 INJECTION, SOLUTION SUBCUTANEOUS EVERY MORNING
Refills: 0 | Status: DISCONTINUED | OUTPATIENT
Start: 2020-02-06 | End: 2020-02-06

## 2020-02-05 RX ORDER — DEXTROSE 50 % IN WATER 50 %
15 SYRINGE (ML) INTRAVENOUS ONCE
Refills: 0 | Status: DISCONTINUED | OUTPATIENT
Start: 2020-02-05 | End: 2020-02-06

## 2020-02-05 RX ORDER — CLOPIDOGREL BISULFATE 75 MG/1
75 TABLET, FILM COATED ORAL DAILY
Refills: 0 | Status: DISCONTINUED | OUTPATIENT
Start: 2020-02-06 | End: 2020-02-06

## 2020-02-05 RX ORDER — INSULIN LISPRO 100/ML
10 VIAL (ML) SUBCUTANEOUS
Refills: 0 | Status: DISCONTINUED | OUTPATIENT
Start: 2020-02-05 | End: 2020-02-06

## 2020-02-05 RX ORDER — METFORMIN HYDROCHLORIDE 850 MG/1
1000 TABLET ORAL
Refills: 0 | Status: DISCONTINUED | OUTPATIENT
Start: 2020-02-06 | End: 2020-02-06

## 2020-02-05 RX ORDER — HYDROMORPHONE HYDROCHLORIDE 2 MG/ML
0.5 INJECTION INTRAMUSCULAR; INTRAVENOUS; SUBCUTANEOUS
Refills: 0 | Status: DISCONTINUED | OUTPATIENT
Start: 2020-02-05 | End: 2020-02-06

## 2020-02-05 RX ORDER — LANOLIN ALCOHOL/MO/W.PET/CERES
5 CREAM (GRAM) TOPICAL AT BEDTIME
Refills: 0 | Status: DISCONTINUED | OUTPATIENT
Start: 2020-02-05 | End: 2020-02-06

## 2020-02-05 RX ADMIN — Medication 400 MILLIGRAM(S): at 22:03

## 2020-02-05 RX ADMIN — SODIUM CHLORIDE 3 MILLILITER(S): 9 INJECTION INTRAMUSCULAR; INTRAVENOUS; SUBCUTANEOUS at 21:09

## 2020-02-05 RX ADMIN — HYDROMORPHONE HYDROCHLORIDE 0.5 MILLIGRAM(S): 2 INJECTION INTRAMUSCULAR; INTRAVENOUS; SUBCUTANEOUS at 17:58

## 2020-02-05 RX ADMIN — Medication 1000 MILLIGRAM(S): at 23:00

## 2020-02-05 RX ADMIN — Medication 3 MILLILITER(S): at 20:47

## 2020-02-05 RX ADMIN — ATORVASTATIN CALCIUM 40 MILLIGRAM(S): 80 TABLET, FILM COATED ORAL at 22:04

## 2020-02-05 RX ADMIN — SACUBITRIL AND VALSARTAN 1 TABLET(S): 24; 26 TABLET, FILM COATED ORAL at 19:07

## 2020-02-05 RX ADMIN — HYDROMORPHONE HYDROCHLORIDE 0.5 MILLIGRAM(S): 2 INJECTION INTRAMUSCULAR; INTRAVENOUS; SUBCUTANEOUS at 18:41

## 2020-02-05 NOTE — DISCHARGE NOTE PROVIDER - PROVIDER TOKENS
PROVIDER:[TOKEN:[[id number]],FOLLOWUP:[1 month]],PROVIDER:[TOKEN:[[id number]],FOLLOWUP:[Routine]],PROVIDER:[TOKEN:[[id number]],FOLLOWUP:[1 week]] PROVIDER:[TOKEN:[[id number]],FOLLOWUP:[1 month]],PROVIDER:[TOKEN:[[id number]],FOLLOWUP:[Routine]],PROVIDER:[TOKEN:[[id number]],SCHEDULEDAPPT:[02/10/2020],SCHEDULEDAPPTTIME:[09:30 AM]]

## 2020-02-05 NOTE — DISCHARGE NOTE PROVIDER - NSDCMRMEDTOKEN_GEN_ALL_CORE_FT
amiodarone 200 mg oral tablet:   Aspirin Enteric Coated 81 mg oral delayed release tablet: 1 tab(s) orally once a day MDD:1  atorvastatin 40 mg oral tablet: 1 tab(s) orally once a day (at bedtime)  Bevespi Aerosphere 9 mcg-4.8 mcg/inh inhalation aerosol: 2 puff(s) inhaled 2 times a day  clopidogrel 75 mg oral tablet: 1 tab(s) orally once a day  Eliquis 5 mg oral tablet: 1 tab(s) orally 2 times a day   Entresto 24 mg-26 mg oral tablet: 1 tab(s) orally 2 times a day  ESZOPICLONE 3 MG TABLET: 1 tab(s) orally once a day  FENOFIBRATE 160 MG TABLET: 1 tab(s) orally once a day  furosemide 40 mg oral tablet: 1 tab(s) orally once a day  metFORMIN 750 mg oral tablet, extended release:   metoprolol succinate 200 mg oral capsule, extended release: 1 cap(s) orally once a day  nitroglycerin 0.4 mg sublingual tablet: 1 tab(s) sublingual every 5 minutes, As Needed  NovoLOG 100 units/mL injectable solution: 8 unit(s) injectable once a day  OMEGA-3 ETHYL ESTERS 1 GM CAP: 1 cap(s) orally once a day  Tresiba 100 units/mL subcutaneous solution: 150 unit(s) subcutaneous once a day  TRULICITY 1.5 MG/0.5 ML PEN: subcutaneous every 7 days  valsartan 320 mg oral tablet: 1 tab(s) orally once a day acetaminophen-codeine 300 mg-15 mg oral tablet: 1 tab(s) orally 4 times a day, As Needed -for severe pain MDD:Six   do not drive while taking this medication  amiodarone 200 mg oral tablet:   Aspirin Enteric Coated 81 mg oral delayed release tablet: 1 tab(s) orally once a day MDD:1  atorvastatin 40 mg oral tablet: 1 tab(s) orally once a day (at bedtime)  Bevespi Aerosphere 9 mcg-4.8 mcg/inh inhalation aerosol: 2 puff(s) inhaled 2 times a day  clopidogrel 75 mg oral tablet: 1 tab(s) orally once a day  Eliquis 5 mg oral tablet: 1 tab(s) orally 2 times a day.  START SUNDAY FEBRUARY 9  Entresto 24 mg-26 mg oral tablet: 1 tab(s) orally 2 times a day  ESZOPICLONE 3 MG TABLET: 1 tab(s) orally once a day  FENOFIBRATE 160 MG TABLET: 1 tab(s) orally once a day  furosemide 40 mg oral tablet: 1 tab(s) orally once a day  metFORMIN 750 mg oral tablet, extended release:   metoprolol succinate 200 mg oral capsule, extended release: 1 cap(s) orally once a day  nitroglycerin 0.4 mg sublingual tablet: 1 tab(s) sublingual every 5 minutes, As Needed  NovoLOG 100 units/mL injectable solution: 8 unit(s) injectable once a day  OMEGA-3 ETHYL ESTERS 1 GM CAP: 1 cap(s) orally once a day  Tresiba 100 units/mL subcutaneous solution: 150 unit(s) subcutaneous once a day  TRULICITY 1.5 MG/0.5 ML PEN: subcutaneous every 7 days

## 2020-02-05 NOTE — PRE-ANESTHESIA EVALUATION ADULT - NSANTHOSAYNRD_GEN_A_CORE
No. LAW screening performed.  STOP BANG Legend: 0-2 = LOW Risk; 3-4 = INTERMEDIATE Risk; 5-8 = HIGH Risk/never tested, see screening tool

## 2020-02-05 NOTE — BRIEF OPERATIVE NOTE - NSICDXBRIEFPOSTOP_GEN_ALL_CORE_FT
POST-OP DIAGNOSIS:  LBBB (left bundle branch block) 05-Feb-2020 17:30:24  Donell Jose  Ischemic cardiomyopathy 05-Feb-2020 17:30:17  Donell Jose  CHF with cardiomyopathy 05-Feb-2020 17:30:06  Donell Jose

## 2020-02-05 NOTE — DISCHARGE NOTE PROVIDER - CARE PROVIDERS DIRECT ADDRESSES
[email or web address]

## 2020-02-05 NOTE — BRIEF OPERATIVE NOTE - NSICDXBRIEFPREOP_GEN_ALL_CORE_FT
PRE-OP DIAGNOSIS:  Ischemic cardiomyopathy 05-Feb-2020 17:29:53  Donell Jose  LBBB (left bundle branch block) 05-Feb-2020 17:29:46  Donell Jose  CHF with cardiomyopathy 05-Feb-2020 17:29:35  Donell Jose

## 2020-02-05 NOTE — DISCHARGE NOTE PROVIDER - HOSPITAL COURSE
65 yo male presents with hx cad, chf& afib, and ischemic cardiomyopathy presented to hospital 2/5 for elective BiV ICD implantation. His PO course was 63 yo male presents with hx cad, chf& afib, and ischemic cardiomyopathy presented to hospital 2/5 for elective BiV ICD implantation. His PO course was uneventful and he was discharged home on POD #1 stable.

## 2020-02-05 NOTE — DISCHARGE NOTE PROVIDER - NSDCFUADDINST_GEN_ALL_CORE_FT
Call office for wound issues such as increasing pain, swelling or redness.    Go to ER for acute shortness or breath or chest pain.  Do not raise left arm above left shoulder for 4 weeks  Call office, [phone] for any concerns or questions  No driving till instructed by Dr. Jose  No strenuous activity or repetitive pushing/ pulling motion  You may shower with clear dressing on Call office for wound issues such as increasing pain, swelling or redness.    Go to ER for acute shortness or breath or chest pain.  Do not raise left arm above left shoulder for 4 weeks or sleep on left side.  Call office, [phone] for any concerns or questions  No driving till instructed by Dr. Jose  No strenuous activity or repetitive pushing/ pulling motion  You may shower with clear dressing on

## 2020-02-05 NOTE — DISCHARGE NOTE PROVIDER - CARE PROVIDER_API CALL
Yuni Herbert)  Cardiology; Internal Medicine  [address]  [place] [address]  Phone: [phone]  Fax: [phone]  Follow Up Time: 1 month    Martin Shaver ()  Internal Medicine  [facility] [place] [address]  Phone: [phone]  Fax: [phone]  Follow Up Time: Routine    Donell Jose)  Surgery; Thoracic and Cardiac Surgery  [address] [place] [address], [address]  [place] [address]  Phone: [phone]  Fax: [phone]  Follow Up Time: 1 week Yuni Herbert)  Cardiology; Internal Medicine  [address] [facility]  [place] [address]  Phone: [phone]  Fax: [phone]  Follow Up Time: 1 month    Martin Shaver ()  Internal Medicine  [address] [place] [address]  Phone: [phone]  Fax: [phone]  Follow Up Time: Routine    Donell Jose)  Surgery; Thoracic and Cardiac Surgery  [address] [place] [address], [address]  [place] [address]  Phone: [phone]  Fax: [phone]  Scheduled Appointment: 02/10/2020 09:30 AM

## 2020-02-05 NOTE — BRIEF OPERATIVE NOTE - OPERATION/FINDINGS
SUCCESSFUL IMPLANT OF RA/RV/LV LEADS WITH GOOD LEAD PROPERTIES SUCCESSFUL IMPLANT OF RA/RV/LV LEADS WITH GOOD LEAD PROPERTIES;    Patient has chronic systolic heart failure due to ischemic cardiomyopathy.

## 2020-02-06 ENCOUNTER — TRANSCRIPTION ENCOUNTER (OUTPATIENT)
Age: 65
End: 2020-02-06

## 2020-02-06 VITALS — HEART RATE: 72 BPM | OXYGEN SATURATION: 96 % | SYSTOLIC BLOOD PRESSURE: 140 MMHG | DIASTOLIC BLOOD PRESSURE: 68 MMHG

## 2020-02-06 LAB — GLUCOSE BLDC GLUCOMTR-MCNC: 219 MG/DL — HIGH (ref 70–99)

## 2020-02-06 PROCEDURE — 93010 ELECTROCARDIOGRAM REPORT: CPT

## 2020-02-06 PROCEDURE — 93283 PRGRMG EVAL IMPLANTABLE DFB: CPT | Mod: 26

## 2020-02-06 PROCEDURE — 71045 X-RAY EXAM CHEST 1 VIEW: CPT | Mod: 26,59

## 2020-02-06 PROCEDURE — 71046 X-RAY EXAM CHEST 2 VIEWS: CPT | Mod: 26

## 2020-02-06 RX ORDER — VALSARTAN 80 MG/1
1 TABLET ORAL
Qty: 0 | Refills: 0 | DISCHARGE

## 2020-02-06 RX ORDER — APIXABAN 2.5 MG/1
1 TABLET, FILM COATED ORAL
Qty: 60 | Refills: 1
Start: 2020-02-06 | End: 2020-04-05

## 2020-02-06 RX ORDER — ACETAMINOPHEN WITH CODEINE 300MG-30MG
1 TABLET ORAL
Qty: 12 | Refills: 0
Start: 2020-02-06 | End: 2020-02-08

## 2020-02-06 RX ORDER — CEFAZOLIN SODIUM 1 G
1000 VIAL (EA) INJECTION EVERY 6 HOURS
Refills: 0 | Status: COMPLETED | OUTPATIENT
Start: 2020-02-06 | End: 2020-02-06

## 2020-02-06 RX ORDER — KETOROLAC TROMETHAMINE 30 MG/ML
15 SYRINGE (ML) INJECTION ONCE
Refills: 0 | Status: DISCONTINUED | OUTPATIENT
Start: 2020-02-06 | End: 2020-02-06

## 2020-02-06 RX ADMIN — Medication 10 UNIT(S): at 11:29

## 2020-02-06 RX ADMIN — HYDROMORPHONE HYDROCHLORIDE 0.5 MILLIGRAM(S): 2 INJECTION INTRAMUSCULAR; INTRAVENOUS; SUBCUTANEOUS at 12:25

## 2020-02-06 RX ADMIN — Medication 81 MILLIGRAM(S): at 11:27

## 2020-02-06 RX ADMIN — Medication 4: at 11:23

## 2020-02-06 RX ADMIN — CLOPIDOGREL BISULFATE 75 MILLIGRAM(S): 75 TABLET, FILM COATED ORAL at 11:27

## 2020-02-06 RX ADMIN — Medication 15 MILLIGRAM(S): at 02:00

## 2020-02-06 RX ADMIN — INSULIN GLARGINE 50 UNIT(S): 100 INJECTION, SOLUTION SUBCUTANEOUS at 07:54

## 2020-02-06 RX ADMIN — SODIUM CHLORIDE 3 MILLILITER(S): 9 INJECTION INTRAMUSCULAR; INTRAVENOUS; SUBCUTANEOUS at 11:37

## 2020-02-06 RX ADMIN — Medication 50 MILLIGRAM(S): at 16:38

## 2020-02-06 RX ADMIN — METFORMIN HYDROCHLORIDE 1000 MILLIGRAM(S): 850 TABLET ORAL at 09:01

## 2020-02-06 RX ADMIN — Medication 40 MILLIGRAM(S): at 06:15

## 2020-02-06 RX ADMIN — Medication 100 MILLIGRAM(S): at 10:48

## 2020-02-06 RX ADMIN — AMIODARONE HYDROCHLORIDE 200 MILLIGRAM(S): 400 TABLET ORAL at 06:15

## 2020-02-06 RX ADMIN — Medication 15 MILLIGRAM(S): at 01:10

## 2020-02-06 RX ADMIN — Medication 100 MILLIGRAM(S): at 12:56

## 2020-02-06 RX ADMIN — Medication 3 MILLILITER(S): at 01:27

## 2020-02-06 RX ADMIN — HYDROMORPHONE HYDROCHLORIDE 0.5 MILLIGRAM(S): 2 INJECTION INTRAMUSCULAR; INTRAVENOUS; SUBCUTANEOUS at 11:49

## 2020-02-06 RX ADMIN — SODIUM CHLORIDE 3 MILLILITER(S): 9 INJECTION INTRAMUSCULAR; INTRAVENOUS; SUBCUTANEOUS at 05:01

## 2020-02-06 RX ADMIN — Medication 50 MILLIGRAM(S): at 06:14

## 2020-02-06 RX ADMIN — Medication 10 UNIT(S): at 07:52

## 2020-02-06 RX ADMIN — METFORMIN HYDROCHLORIDE 1000 MILLIGRAM(S): 850 TABLET ORAL at 16:37

## 2020-02-06 RX ADMIN — SACUBITRIL AND VALSARTAN 1 TABLET(S): 24; 26 TABLET, FILM COATED ORAL at 06:15

## 2020-02-06 NOTE — DISCHARGE NOTE NURSING/CASE MANAGEMENT/SOCIAL WORK - PATIENT PORTAL LINK FT
You can access the FollowMyHealth Patient Portal offered by [facility] by registering at the following website: [email or web address]. By joining [employer]’s FollowMyHealth portal, you will also be able to view your health information using other applications (apps) compatible with our system.

## 2020-02-06 NOTE — PROGRESS NOTE ADULT - SUBJECTIVE AND OBJECTIVE BOX
OPERATIVE PROCEDURE(s):                POD #   1 s/p upgrade to BiV ICD                    65yMale  SURGEON(s): CORTNEY Jose  SUBJECTIVE ASSESSMENT: patient seen this AM with Dr. Jose. primary complaint is incisional pain  Vital Signs Last 24 Hrs  T(F): 98.5 (06 Feb 2020 07:00), Max: 98.5 (06 Feb 2020 07:00)  HR: 71 (06 Feb 2020 07:00) (65 - 72)  BP: 134/64 (06 Feb 2020 07:00) (106/62 - 144/72)  BP(mean): 104 (05 Feb 2020 17:35) (104 - 104)  ABP: 149/80 (05 Feb 2020 19:30) (123/65 - 154/86)  ABP(mean): 101 (05 Feb 2020 19:30)  RR: 13 (06 Feb 2020 07:00) (13 - 22)  SpO2: 95% (06 Feb 2020 07:00) (94% - 100%)    I&O's Detail    05 Feb 2020 07:01  -  06 Feb 2020 07:00  --------------------------------------------------------  IN:    Oral Fluid: 240 mL  Total IN: 240 mL    OUT:    Voided: 410 mL  Total OUT: 410 mL    Net:   I&O's Detail    05 Feb 2020 07:01  -  06 Feb 2020 07:00  --------------------------------------------------------  Total NET: -170 mL    CAPILLARY BLOOD GLUCOSE    POCT Blood Glucose.: 118 mg/dL (05 Feb 2020 21:07)    Physical Exam:  Left chest incision with some swelling and ecchymosis, soft not tense, tegaderm in place no new drainage  Extremities: No edema Left upper extremity  CXR: < from: Xray Chest 1 View- PORTABLE-Routine (02.05.20 @ 17:37) >  Impression:      No radiographic evidence of acute cardiopulmonary disease.    Post left ICD placement without definite pneumothorax as left lung apex is obscured.      MEDICATIONS  (STANDING):  albuterol/ipratropium for Nebulization 3 milliLiter(s) Nebulizer every 6 hours  aMIOdarone    Tablet 200 milliGRAM(s) Oral daily  aspirin enteric coated 81 milliGRAM(s) Oral daily  atorvastatin 40 milliGRAM(s) Oral at bedtime  clopidogrel Tablet 75 milliGRAM(s) Oral daily  dextrose 5%. 1000 milliLiter(s) (50 mL/Hr) IV Continuous <Continuous>  dextrose 50% Injectable 12.5 Gram(s) IV Push once  dextrose 50% Injectable 25 Gram(s) IV Push once  dextrose 50% Injectable 25 Gram(s) IV Push once  furosemide    Tablet 40 milliGRAM(s) Oral daily  insulin glargine Injectable (LANTUS) 50 Unit(s) SubCutaneous every morning  insulin lispro (HumaLOG) corrective regimen sliding scale   SubCutaneous three times a day before meals  insulin lispro Injectable (HumaLOG) 10 Unit(s) SubCutaneous three times a day before meals  melatonin 5 milliGRAM(s) Oral at bedtime  metFORMIN 1000 milliGRAM(s) Oral two times a day with meals  metoprolol tartrate 50 milliGRAM(s) Oral two times a day  sacubitril 24 mG/valsartan 26 mG 1 Tablet(s) Oral two times a day  sodium chloride 0.9% lock flush 3 milliLiter(s) IV Push every 8 hours    MEDICATIONS  (PRN):  dextrose 40% Gel 15 Gram(s) Oral once PRN Blood Glucose LESS THAN 70 milliGRAM(s)/deciliter  glucagon  Injectable 1 milliGRAM(s) IntraMuscular once PRN Glucose LESS THAN 70 milligrams/deciliter  HYDROmorphone  Injectable 0.5 milliGRAM(s) IV Push every 10 minutes PRN Moderate Pain (4 - 6)    Allergies    sulfa drugs (Unknown)    Intolerances      Ambulation/Activity Status:    Assessment/Plan:  65y Male status-post PPM - stable  - Case and plan discussed with CTU Intensivist and CT Surgeon - Dr. Hendrickson/Damon/Demario   - Continue CTU supportive care    - Continue DVT SCDs  - Incentive Spirometry 10 times an hour  - Continue to advance physical activity   - complete antibiotics  - AM  continue with insulin  - EP to check device  - if device working properly patient may be discharged home    Social Service Disposition:  home today with f/u in offic on 2/10

## 2020-02-06 NOTE — DISCHARGE NOTE NURSING/CASE MANAGEMENT/SOCIAL WORK - NSDCPEWEB_GEN_ALL_CORE
[place] website --- [email or web address] website --- [email or web address]

## 2020-02-06 NOTE — DISCHARGE NOTE NURSING/CASE MANAGEMENT/SOCIAL WORK - NSDCPEEMAIL_GEN_ALL_CORE
[facility] email [email or web address]

## 2020-02-06 NOTE — PROGRESS NOTE ADULT - SUBJECTIVE AND OBJECTIVE BOX
INTERVAL HPI/OVERNIGHT EVENTS:    Patient s/p  BiV- ICD implant  No event over night. Pt without complains    MEDICATIONS  (STANDING):  albuterol/ipratropium for Nebulization 3 milliLiter(s) Nebulizer every 6 hours  aMIOdarone    Tablet 200 milliGRAM(s) Oral daily  aspirin enteric coated 81 milliGRAM(s) Oral daily  atorvastatin 40 milliGRAM(s) Oral at bedtime  ceFAZolin   IVPB 1000 milliGRAM(s) IV Intermittent every 6 hours  clopidogrel Tablet 75 milliGRAM(s) Oral daily  dextrose 5%. 1000 milliLiter(s) (50 mL/Hr) IV Continuous <Continuous>  dextrose 50% Injectable 12.5 Gram(s) IV Push once  dextrose 50% Injectable 25 Gram(s) IV Push once  dextrose 50% Injectable 25 Gram(s) IV Push once  furosemide    Tablet 40 milliGRAM(s) Oral daily  insulin glargine Injectable (LANTUS) 50 Unit(s) SubCutaneous every morning  insulin lispro (HumaLOG) corrective regimen sliding scale   SubCutaneous three times a day before meals  insulin lispro Injectable (HumaLOG) 10 Unit(s) SubCutaneous three times a day before meals  melatonin 5 milliGRAM(s) Oral at bedtime  metFORMIN 1000 milliGRAM(s) Oral two times a day with meals  metoprolol tartrate 50 milliGRAM(s) Oral two times a day  sacubitril 24 mG/valsartan 26 mG 1 Tablet(s) Oral two times a day  sodium chloride 0.9% lock flush 3 milliLiter(s) IV Push every 8 hours    MEDICATIONS  (PRN):  dextrose 40% Gel 15 Gram(s) Oral once PRN Blood Glucose LESS THAN 70 milliGRAM(s)/deciliter  glucagon  Injectable 1 milliGRAM(s) IntraMuscular once PRN Glucose LESS THAN 70 milligrams/deciliter  HYDROmorphone  Injectable 0.5 milliGRAM(s) IV Push every 10 minutes PRN Moderate Pain (4 - 6)      Allergies    sulfa drugs (Unknown)    Intolerances          Vital Signs Last 24 Hrs  T(C): 36.9 (06 Feb 2020 07:00), Max: 36.9 (06 Feb 2020 07:00)  T(F): 98.5 (06 Feb 2020 07:00), Max: 98.5 (06 Feb 2020 07:00)  HR: 71 (06 Feb 2020 07:00) (65 - 72)  BP: 134/64 (06 Feb 2020 07:00) (106/62 - 144/72)  BP(mean): 104 (05 Feb 2020 17:35) (104 - 104)  RR: 13 (06 Feb 2020 07:00) (13 - 22)  SpO2: 95% (06 Feb 2020 07:00) (94% - 100%)    GENERAL: In no apparent distress, well nourished, and hydrated.  HEART: Regular rate and rhythm; No murmurs, rubs, or gallops.  	Wound healing well; No hematoma; no bleeding  PULMONARY: Clear to auscultation and perfusion.  No rales, wheezing, or rhonchi bilaterally.  ABDOMEN: Soft, Nontender, Nondistended; Bowel sounds present  EXTREMITIES:  2+ Peripheral Pulses, No clubbing, cyanosis, or edema  NEUROLOGICAL: Grossly nonfocal    RADIOLOGY & ADDITIONAL TESTS:

## 2020-02-06 NOTE — PROGRESS NOTE ADULT - ASSESSMENT
A/P   Patient s/p BiV- ICD implant    - CXR pending  - Device interrogation done, working propely awaiting review by attending  - Keflex 500 mg PO q12 h x 5 days  - FU in 3-4 weeks with Dr Fall      No Heavy lifting >5 lbs. Do not raise your arm above shoulder level for 4-6 weeks. No  bathtub, . No driving .  Wound and dressing instruction per Dr Jose team

## 2020-02-06 NOTE — DISCHARGE NOTE NURSING/CASE MANAGEMENT/SOCIAL WORK - NSDCPEELIQUISREACT_GEN_ALL_CORE
MEDICARE WELLNESS VISIT NOTE      History of Present Illness:   Emilia Peters presents for her Subsequent Annual Medicare Wellness Visit.   She has no current complaints or concerns.    questions about supplements - she buys supplements from \"[employer]\" - B12 complex, Vit D (last Vit D level was 39.9 in 7/2019), and curcumin, which she feels has helped her knees greatly.  She is under the care of a psychiatrist ([facility] [place]), being treated for both anxiety and depression    She used to take an antacid, then weaned herself off it    She is having bladder prolapse, not she wants to do anything about this but would like to see Dr. Oconnor for discussion about a pessary    Patient Care Team:  Kinjal Reyes MD as PCP - General (Family Practice)  Jerry Issa MD as Referring Provider (Orthopedic Surgery)        Patient Active Problem List    Diagnosis Date Noted   • PAT (paroxysmal atrial tachycardia) (CMS/[facility]) 12/16/2015     Priority: High   • Atrial flutter (CMS/[facility]) 10/06/2015     Priority: High   • Hiatal hernia 11/02/2018     Priority: Low     per EGD     • Esophageal stricture 11/02/2018     Priority: Low     per EGD     • Allergic rhinitis due to pollen 08/21/2018     Priority: Low     Trees, grasses, weeds, dust mite and cat and dog.     • Oral allergy syndrome 08/21/2018     Priority: Low   • Food impaction of esophagus 08/21/2018     Priority: Low     May be secondary to oral allergy syndrome or possibly eosinophilic esophagitis.     • Varicose veins 07/22/2016     Priority: Low   • Multinodular goiter 07/09/2015     Priority: Low   • PAF (paroxysmal atrial fibrillation) (CMS/[facility]) 06/09/2014     Priority: Low     PAF. Sx 2009. Symptomatic. Tachy palpitations. Negative 7 day Event monitor 5/20/14  CHADS-VACS= 2 (Gender, Age)  Normal LVEF, SANA 40.2 ml/m2 per Echo 4/10/14  Hypothyroidism. Supplemented.  ADD  MATHEW.      Recent event montior on 05/20/14:The baseline recordings showed  normal sinus rhythm in the 70s.   There were 4 additional transmissions for review. There were no  symptomatic recordings. The transmissions showed sinus rhythm or  sinus bradycardia. The lowest rate was 42 and maximal rate 70  beats per minute. There were no episodes of atrial fibrillation  Seen.    05/15/14: TSH 1.7.       • Tear film insufficiency, unspecified 02/21/2012     Priority: Low   • MGD (meibomian gland dysfunction) 02/21/2012     Priority: Low   • Superficial injury of cornea 09/13/2011     Priority: Low   • Dendritic keratitis 08/30/2011     Priority: Low   • Generalized anxiety disorder 12/30/2010     Priority: Low   • Psoriasis 06/24/2010     Priority: Low   • Attention deficit disorder without mention of hyperactivity 12/08/2009     Priority: Low   • Osteoporosis, unspecified 12/08/2009     Priority: Low   • Unspecified hypothyroidism 11/23/2009     Priority: Low         Past Medical History:   Diagnosis Date   • Atrial fibrillation (CMS/[facility])    • Attention deficit disorder with hyperactivity(314.01)    • Depression    • Effusion of lower leg joint 01/22/2004    R knee   • Esophageal stricture 11/02/2018    per EGD   • Gastroesophageal reflux disease    • Generalized anxiety disorder 12/30/2010   • Hiatal hernia 11/02/2018    per EGD   • MGD (meibomian gland dysfunction) 2/21/2012   • Multinodular goiter 7/9/2015   • Osteopenia    • Osteoporosis 9/15/2014   • Other closed fractures of distal end of radius (alone) 03/15/2004    R wrist--   • Pain in joint, lower leg 01/22/2004    R knee   • Primary localized osteoarthrosis, lower leg 01/22/2004    R knee   • Psoriasis 6/24/2010   • Stress fracture of other bone 5/2009    R and Left ankle Pins/plates    • Tear film insufficiency, unspecified 2/21/2012   • Thyroid nodule     left side FNA negative 1/2014   • Unspecified hypothyroidism 11/23/2009         Past Surgical History:   Procedure Laterality Date   • Colonoscopy  4/20/15    Dr. Kaur; F/U 5  years   • Ep ablation  11/4/15    AFib, Flutter, Atrial Tach   • Esophagogastroduodenoscopy with transendo balloon dil <30mm  11/02/2018    esophagitis,Eso stricture>dilated,HH,.    • Laparoscopy,tubal cautery      Tubal Ligation   • Open access colonoscopy  1/5/05    normal no polyps.    • Open rx dist rad/ulna fx  01/28/2004    R wrist   • Removal gallbladder      Cholecystectomy (gallbladder)   • Tonsillectomy and adenoidectomy           Social History     Tobacco Use   • Smoking status: Never Smoker   • Smokeless tobacco: Never Used   Substance Use Topics   • Alcohol use: Yes     Alcohol/week: 0.0 - 2.0 standard drinks     Frequency: 2-4 times a month     Drinks per session: 1 or 2     Binge frequency: Never     Comment: occasional   • Drug use: No     Drug use:    Drug Use:    No              Family History   Problem Relation Age of Onset   • Respiratory Mother         emphysema   • Thyroid Mother         Had Goiter or nodule removed (Pt. is unsure, will find out)   • Heart Father 84   • Patient is unaware of any medical problems Sister    • Cancer Sister 71        breast   • Patient is unaware of any medical problems Sister    • Patient is unaware of any medical problems Maternal Grandmother    • Diabetes Maternal Grandfather    • Patient is unaware of any medical problems Paternal Grandmother    • Patient is unaware of any medical problems Paternal Grandfather    • Patient is unaware of any medical problems Daughter    • Asthma Son    • Asthma Daughter        Current Outpatient Medications   Medication Sig Dispense Refill   • ARIPiprazole (ABILIFY) 2 MG tablet Take 1 mg by mouth daily.   1   • levothyroxine (SYNTHROID, LEVOTHROID) 50 MCG tablet Take 1 tablet by mouth daily. 90 tablet 0   • Turmeric, Curcuma Longa, (CURCUMIN) Powder      • diclofenac (VOLTAREN) 1 % gel Apply topically as needed.     • L-Methylfolate-Algae (DEPLIN 15) 15-90.314 MG capsule Take 1 capsule by mouth daily.     •  venlafaxine XR (EFFEXOR XR) 150 MG 24 hr capsule Take 150 mg by mouth daily.      • B Complex Vitamins (VITAMIN B COMPLEX PO)      • Coenzyme Q10 (Q-10 CO-ENZYME PO)      • zoledronic acid (RECLAST) 5 MG/100ML Solution injection Inject 100 mLs into the vein once. 100 mL 1   • cholecalciferol (VITAMIN D3) 1000 UNITS tablet Take 4,000 Units by mouth daily.     • tiZANidine (ZANAFLEX) 4 MG tablet Take 1 tablet by mouth at bedtime. 30 tablet 0   • traMADol (ULTRAM) 50 MG tablet Take 1 tablet by mouth every 6 hours as needed for Pain. 12 tablet 0   • triamcinolone (ARISTOCORT) 0.1 % ointment Apply to affected areas on elbows 2 times daily until smooth 80 g 2   • Calcium 600 MG tablet Take 2 tablets by mouth daily.     • Omega-3 Fatty Acids (OMEGA 3 PO)      • albuterol (PROAIR HFA) 108 (90 BASE) MCG/ACT inhaler Inhale 2 puffs into the lungs every 4 hours as needed for Shortness of Breath or Wheezing. With spacer please 2 Inhaler 5   • ibuprofen (MOTRIN) 200 MG tablet Take 400 mg by mouth every 6 hours as needed.        No current facility-administered medications for this visit.         The following items on the Medicare Health Risk Assessment were found to be positive  1.) Do you have an Advance directive, living will, or power of  for health care document that contains your wishes for end of life care?:  No     6 b.) How many servings of High Fiber / Whole Grain Foods to you have each day ( 1 serving = 1 cup cold cereal, 1/2 cup cooked cereal, 1 slice bread):  1 per day     11a.) Bladder Control problems (urine leaking):  Always     11b.) Bowel control problems:  Often     11d.) Bodily pain:  Often     11e.) Tiredness or Fatigue:  Often     11h.) Problems with your hearing:  Often         Vision and hearing screens: vision - has regular exams; feels she has diminshed hearing so audiology referral is made    Advance Directive:   The patient has the following documents:  Power of  for Health Care  Living  Will (Declaration for Physicians)    Cognitive Assessment: no evidence of cognitive dysfunction by direct observation    Recent PHQ 2/9 Score    PHQ 2:  Date Adult PHQ 2 Score   10/9/2019 2       PHQ 9:       DEPRESSION ASSESSMENT/PLAN:  she is working with her psychiatrist     Body mass index is 27.29 kg/m².    BMI ASSESSMENT/PLAN:  working with [employer]     Needed Screening/Treatment:   Audiology for diminished hearing  Needed follow up:  None    She will get flu shot at pharmacy  ------------------------------------------------------------------------    REVIEW OF SYSTEMS: The patient DENIES symptoms of:   General: Fever, chills, night sweats, fatigue, weight loss, weight gain, decreased appetite.  Skin: Rash, itching, lumps or bumps or moles that are changing, hair changes, nail changes.  Eyes: Visual blurring, double vision, spots before the eyes, eye pain.  Ears: Decreased auditory acuity, ringing or tinnitus in the ears, pain in the ears, discharge from the ears.  Nose: Nosebleed, discharge from the nose, decrease in ability to smell.  Mouth: Soreness of the mouth or tongue or lips, abnormality of the teeth or gums.  Throat: Sore throat, hoarseness or voice change.  Neck: Stiffness or pain in the neck.  Breasts: Changes of the breasts, lumps or bumps in the breasts, discharge from the nipples, pain in the breasts, nipple changes.  Cardiorespiratory: Chest pain, edema, cough, hemoptysis, wheeze, shortness of breath.  Gastrointestinal: Abdominal pain, nausea, vomiting, constipation, diarrhea, black tarry stools, blood in the stools, change in the bowel habits, sour burps or heartburn, indigestion.  Genitourinary: Urgency, frequency, dysuria, hematuria, nocturia, vaginal discharge, vaginal itching, abnormal vaginal bleeding or pain.  Neurologic: Headache, weakness, loss of sensation, visual disturbance, trouble with balance or coordination, loss of memory.  Musculoskeletal: Joint pain, muscle  pain.  Psychiatric: Symptoms of depression, feeling sad or blue.    PHYSICAL EXAM:    Visit Vitals  /70 (Patient Position: Sitting, Cuff Size: Regular)   Pulse 86   Ht 5' 5\" (1.651 m)   Wt 74.4 kg   SpO2 95%   BMI 27.29 kg/m²      GENERAL APPEARANCE: Appears stated age, is in no apparent distress, is well developed and well nourished.  SKIN: Normal color for ethnicity, normal texture, good turgor, no skin rashes, no atypical-appearing skin lesions, no bruises.  LYMPH NODES: No cervical, supraclavicular, axillary or inguinal adenopathy.   HEAD: Normocephalic.  EYES: Pupils are equal and reactive to light and accommodation, extraocular movements are full, sclerae and conjunctivae are normal, lids and lashes are normal.  Visual acuity above.  EARS: Pinnae and external ears are normal bilaterally, external auditory canals are normal, tympanic membranes are normal,  there is no tragal tenderness, auditory acuity is grossly intact.  See above.   NOSE: External nose is normal to inspection, no septal deviation.  MOUTH/THROAT: Tongue is midline and appears normal, oropharynx appears normal, soft palate and uvula are normal, oral mucosa is normal.  Dentition healthy.  NECK: Neck is supple, no thyromegaly, trachea is midline.  CHEST: Configuration normal, nontender to palpation, respiratory effort is not labored.  LUNGS: Lungs are clear to auscultation with normal inspiratory/expiratory sounds, no rales, no rhonchi, no wheezes.  CARDIOVASCULAR: Normal point of maximal impulse, normal rate and rhythm, no palpable heaves or thrills, S1 and S2 normal, no S3 or S4, no murmurs, no extra heart sounds.   No carotid or abdominal bruits.  ABDOMEN: Abdomen is soft, normal active bowel sounds, nontender, without masses, without hepatomegaly or splenomegaly, no pulsatile masses.  BACK: Inspection shows normal curvature, no discomfort to palpation of the midline, no costovertebral angle discomfort to palpation.  EXTREMITIES: No  clubbing, no cyanosis, no edema, normal muscle tone and development bilaterally.  NEUROLOGIC:  Alert, appropriate, oriented x 3.  Speech fluent. Cranial nerves 2-12 intact, motor strength intact and symmetric, no apraxia or ataxia observed, deep tendon reflexes normal and symmetric, no tremor noted.  PSYCHIATRIC:  Affect appropriate, insight fair, mood good.    See orders.   See Patient Instructions section.   Return in about 1 year (around 10/9/2020) for Medicare Wellness Visit.   Apixaban/Eliquis increases your risk for bleeding. Notify your doctor if you experience any of the following side effects: bleeding, coughing or vomiting blood, red or black stool, unexpected pain or swelling, itching or hives, chest pain, chest tightness, trouble breathing, changes in how much or how often you urinate, red or pink urine, numbness or tingling in your feet, or unusual muscle weakness. When Apixaban/Eliquis is taken with other medicines, they can affect how it works. Taking other medications such as aspirin, blood thinners, nonsteroidal anti-inflammatories, and medications that treat depression can increase your risk of bleeding. It is very important to tell your health care provider about all of the other medicines, including over-the-counter medications, herbs, and vitamins you are taking. DO NOT start, stop, or change the dosage of any medicine, including over-the-counter medicines, vitamins, and herbal products without your doctor’s approval. Any products containing aspirin or are nonsteroidal anti-inflammatories lessen the blood’s ability to form clots and add to the effect of Apixaban/Eliquis. Never take aspirin or medicines that contain aspirin without speaking to your doctor.

## 2020-02-07 LAB — GLUCOSE BLDC GLUCOMTR-MCNC: 123 MG/DL — HIGH (ref 70–99)

## 2020-02-10 ENCOUNTER — APPOINTMENT (OUTPATIENT)
Dept: CARDIOTHORACIC SURGERY | Facility: CLINIC | Age: 65
End: 2020-02-10
Payer: MEDICARE

## 2020-02-10 VITALS
TEMPERATURE: 98.4 F | HEIGHT: 63 IN | SYSTOLIC BLOOD PRESSURE: 192 MMHG | RESPIRATION RATE: 12 BRPM | HEART RATE: 80 BPM | DIASTOLIC BLOOD PRESSURE: 86 MMHG | OXYGEN SATURATION: 97 %

## 2020-02-10 DIAGNOSIS — I44.7 LEFT BUNDLE-BRANCH BLOCK, UNSPECIFIED: ICD-10-CM

## 2020-02-10 DIAGNOSIS — I48.91 UNSPECIFIED ATRIAL FIBRILLATION: ICD-10-CM

## 2020-02-10 DIAGNOSIS — I25.10 ATHEROSCLEROTIC HEART DISEASE OF NATIVE CORONARY ARTERY WITHOUT ANGINA PECTORIS: ICD-10-CM

## 2020-02-10 DIAGNOSIS — Z88.2 ALLERGY STATUS TO SULFONAMIDES: ICD-10-CM

## 2020-02-10 DIAGNOSIS — I25.5 ISCHEMIC CARDIOMYOPATHY: ICD-10-CM

## 2020-02-10 DIAGNOSIS — I50.22 CHRONIC SYSTOLIC (CONGESTIVE) HEART FAILURE: ICD-10-CM

## 2020-02-10 PROCEDURE — 99024 POSTOP FOLLOW-UP VISIT: CPT

## 2020-02-12 ENCOUNTER — APPOINTMENT (OUTPATIENT)
Dept: CARDIOTHORACIC SURGERY | Facility: CLINIC | Age: 65
End: 2020-02-12
Payer: MEDICARE

## 2020-02-12 VITALS
TEMPERATURE: 98.5 F | HEART RATE: 71 BPM | OXYGEN SATURATION: 98 % | HEIGHT: 63 IN | RESPIRATION RATE: 14 BRPM | DIASTOLIC BLOOD PRESSURE: 87 MMHG | SYSTOLIC BLOOD PRESSURE: 167 MMHG

## 2020-02-12 PROCEDURE — 99024 POSTOP FOLLOW-UP VISIT: CPT

## 2020-02-18 ENCOUNTER — APPOINTMENT (OUTPATIENT)
Dept: CARDIOTHORACIC SURGERY | Facility: CLINIC | Age: 65
End: 2020-02-18
Payer: MEDICARE

## 2020-02-18 ENCOUNTER — LABORATORY RESULT (OUTPATIENT)
Age: 65
End: 2020-02-18

## 2020-02-18 VITALS
BODY MASS INDEX: 33.31 KG/M2 | HEIGHT: 63 IN | SYSTOLIC BLOOD PRESSURE: 165 MMHG | DIASTOLIC BLOOD PRESSURE: 84 MMHG | OXYGEN SATURATION: 98 % | WEIGHT: 188 LBS | TEMPERATURE: 98.4 F | HEART RATE: 77 BPM | RESPIRATION RATE: 16 BRPM

## 2020-02-18 PROCEDURE — 99024 POSTOP FOLLOW-UP VISIT: CPT

## 2020-02-26 ENCOUNTER — APPOINTMENT (OUTPATIENT)
Dept: CARDIOLOGY | Facility: CLINIC | Age: 65
End: 2020-02-26
Payer: MEDICARE

## 2020-02-26 ENCOUNTER — APPOINTMENT (OUTPATIENT)
Dept: CARDIOTHORACIC SURGERY | Facility: CLINIC | Age: 65
End: 2020-02-26
Payer: MEDICARE

## 2020-02-26 VITALS
BODY MASS INDEX: 33.31 KG/M2 | WEIGHT: 188 LBS | RESPIRATION RATE: 14 BRPM | TEMPERATURE: 98 F | HEART RATE: 71 BPM | DIASTOLIC BLOOD PRESSURE: 84 MMHG | SYSTOLIC BLOOD PRESSURE: 149 MMHG | HEIGHT: 63 IN | OXYGEN SATURATION: 98 %

## 2020-02-26 PROCEDURE — 93000 ELECTROCARDIOGRAM COMPLETE: CPT

## 2020-02-26 PROCEDURE — 99213 OFFICE O/P EST LOW 20 MIN: CPT

## 2020-02-26 PROCEDURE — 99024 POSTOP FOLLOW-UP VISIT: CPT

## 2020-07-22 ENCOUNTER — LABORATORY RESULT (OUTPATIENT)
Age: 65
End: 2020-07-22

## 2020-07-22 ENCOUNTER — APPOINTMENT (OUTPATIENT)
Dept: CARDIOLOGY | Facility: CLINIC | Age: 65
End: 2020-07-22
Payer: MEDICARE

## 2020-07-22 PROCEDURE — 93000 ELECTROCARDIOGRAM COMPLETE: CPT

## 2020-07-22 PROCEDURE — 99214 OFFICE O/P EST MOD 30 MIN: CPT

## 2020-07-31 ENCOUNTER — RECORD ABSTRACTING (OUTPATIENT)
Age: 65
End: 2020-07-31

## 2020-07-31 DIAGNOSIS — Z86.79 PERSONAL HISTORY OF OTHER DISEASES OF THE CIRCULATORY SYSTEM: ICD-10-CM

## 2020-08-17 ENCOUNTER — APPOINTMENT (OUTPATIENT)
Dept: CARDIOLOGY | Facility: CLINIC | Age: 65
End: 2020-08-17
Payer: MEDICARE

## 2020-08-17 VITALS
HEART RATE: 69 BPM | HEIGHT: 63 IN | BODY MASS INDEX: 37.92 KG/M2 | DIASTOLIC BLOOD PRESSURE: 90 MMHG | SYSTOLIC BLOOD PRESSURE: 130 MMHG | WEIGHT: 214 LBS

## 2020-08-17 VITALS — SYSTOLIC BLOOD PRESSURE: 140 MMHG | DIASTOLIC BLOOD PRESSURE: 86 MMHG

## 2020-08-17 DIAGNOSIS — Z00.00 ENCOUNTER FOR GENERAL ADULT MEDICAL EXAMINATION W/OUT ABNORMAL FINDINGS: ICD-10-CM

## 2020-08-17 PROCEDURE — 93290 INTERROG DEV EVAL ICPMS IP: CPT | Mod: 59

## 2020-08-17 PROCEDURE — 93284 PRGRMG EVAL IMPLANTABLE DFB: CPT | Mod: 59

## 2020-08-17 PROCEDURE — 99214 OFFICE O/P EST MOD 30 MIN: CPT | Mod: 25

## 2020-08-17 NOTE — HISTORY OF PRESENT ILLNESS
[de-identified] : MI JULY 6 2014 NON STENTABLE LESIONS\par ,WALKS 2 MILES /DAY PLAYS GOLF ONCE /WEEK\par SYNCOPE DUE TO [id number] HYPOTENSION OCTOBER /14\par POST ICD VR MEDTRONIC 10/20/14,\par MILD HTN FOR SEVERAL YS NOT DIABETIC\par SMOKED 2 PP/D STOPPED 30 YS AGO\par \par PT HAS NO C/O TODAY 6-24-15\par \par DENIES CARDIAC COMPLAINTS TODAY 10/19/15\par \par NO CARDIAC C/O TODAY 5/1816\par \par NO C/O CAN WALK 1 1/2 MILES 9/19/16\par \par NO CARDIAC C/O 6/2/17\par NO CARDIAC C/O 10/2/17\par \par NO CARDIAC C/O 7/11/18\par \par NO CARDIAC C/O 11/7/18\par \par NO CARDIAC C/O, HAD A SYNCOPAL EPISODE WHILE SYANDING FOR ALONG TIME IN THE HEAT WENT TO [facility] DISCHARGED HOME NO EVENT1 WEEK AGO 8/6/19.NO CARDIAC C/O TODAY 8/14/19\par \par NO CARDIAC C/O 11/04/2019 \par \par NO  CARDIAC C/O CAM WALK SEVERAL BLOCKS  BUT GETS DYSPNE ON GOING UP 12 STEPS STILL SMOKES 1 PP/D 8/17/20\par

## 2020-08-17 NOTE — ASSESSMENT
[FreeTextEntry1] : ECG SR64/MIN BIV ICD\par \par \par MILD HYPERTENSION UNCERTAIN IF PT IS TAKING VALSARTAN  AND ENTRESTO\par \par NO EVNTS \par \par PLAM\par \par ADVISED TO HOLD  ENTRESTO   TILL VERIFY MEDS\par F/U 3M

## 2020-08-17 NOTE — HISTORY OF PRESENT ILLNESS
[de-identified] : MI JULY 6 2014 NON STENTABLE LESIONS\par ,WALKS 2 MILES /DAY PLAYS GOLF ONCE /WEEK\par SYNCOPE DUE TO [id number] HYPOTENSION OCTOBER /14\par POST ICD VR MEDTRONIC 10/20/14,\par MILD HTN FOR SEVERAL YS NOT DIABETIC\par SMOKED 2 PP/D STOPPED 30 YS AGO\par \par PT HAS NO C/O TODAY 6-24-15\par \par DENIES CARDIAC COMPLAINTS TODAY 10/19/15\par \par NO CARDIAC C/O TODAY 5/1816\par \par NO C/O CAN WALK 1 1/2 MILES 9/19/16\par \par NO CARDIAC C/O 6/2/17\par NO CARDIAC C/O 10/2/17\par \par NO CARDIAC C/O 7/11/18\par \par NO CARDIAC C/O 11/7/18\par \par NO CARDIAC C/O, HAD A SYNCOPAL EPISODE WHILE SYANDING FOR ALONG TIME IN THE HEAT WENT TO [facility] DISCHARGED HOME NO EVENT1 WEEK AGO 8/6/19.NO CARDIAC C/O TODAY 8/14/19\par \par NO CARDIAC C/O 11/04/2019 \par \par NO  CARDIAC C/O CAM WALK SEVERAL BLOCKS  BUT GETS DYSPNE ON GOING UP 12 STEPS STILL SMOKES 1 PP/D 8/17/20\par

## 2020-08-17 NOTE — PHYSICAL EXAM
[Heart Rate And Rhythm] : heart rate and rhythm were normal [Heart Sounds] : normal S1 and S2 [Murmurs] : no murmurs present [Dry] : dry [Clean] : clean [Well-Healed] : well-healed [FreeTextEntry1] : OBESE [Nail Clubbing] : no clubbing of the fingernails [Petechial Hemorrhages (___cm)] : no petechial hemorrhages [Cyanosis, Localized] : no localized cyanosis [] : no ischemic changes

## 2020-08-17 NOTE — PROCEDURE
[No] : not [Longevity: ___ months] : The estimated remaining battery life is [unfilled] months [NSR] : normal sinus rhythm [DDD] : DDD [ICD] : Implantable cardioverter-defibrillator [Sensing Amplitude ___mv] : sensing amplitude was [unfilled] mv [Lead Imp:  ___ohms] : lead impedance was [unfilled] ohms [___V @] : [unfilled] V [Asense-Vsense ___ %] : Asense-Vsense [unfilled]% [___ ms] : [unfilled] ms [Asense-Vpace ___ %] : Asense-Vpace [unfilled]% [Apace-Vsense ___ %] : Apace-Vsense [unfilled]% [Apace-Vpace ___ %] : Apace-Vpace [unfilled]% [de-identified] : MEDTRONIC [de-identified] : [id number] [de-identified] : JAYSON SAUCEDA [de-identified] : [birth year] [de-identified] : 50 [de-identified] : NO EVENTS

## 2020-08-17 NOTE — PROCEDURE
[No] : not [Longevity: ___ months] : The estimated remaining battery life is [unfilled] months [NSR] : normal sinus rhythm [DDD] : DDD [ICD] : Implantable cardioverter-defibrillator [Sensing Amplitude ___mv] : sensing amplitude was [unfilled] mv [Lead Imp:  ___ohms] : lead impedance was [unfilled] ohms [___V @] : [unfilled] V [___ ms] : [unfilled] ms [Asense-Vsense ___ %] : Asense-Vsense [unfilled]% [Apace-Vsense ___ %] : Apace-Vsense [unfilled]% [Asense-Vpace ___ %] : Asense-Vpace [unfilled]% [Apace-Vpace ___ %] : Apace-Vpace [unfilled]% [de-identified] : MEDTRONIC [de-identified] : JAYSON SAUCEDA [de-identified] : [id number] [de-identified] : 2-5-20 [de-identified] : 50 [de-identified] : NO EVENTS

## 2020-08-17 NOTE — PHYSICAL EXAM
[Heart Rate And Rhythm] : heart rate and rhythm were normal [Heart Sounds] : normal S1 and S2 [Murmurs] : no murmurs present [Clean] : clean [Dry] : dry [Well-Healed] : well-healed [FreeTextEntry1] : OBESE [Petechial Hemorrhages (___cm)] : no petechial hemorrhages [Cyanosis, Localized] : no localized cyanosis [Nail Clubbing] : no clubbing of the fingernails [] : no ischemic changes

## 2020-08-24 ENCOUNTER — RX RENEWAL (OUTPATIENT)
Age: 65
End: 2020-08-24

## 2020-08-25 NOTE — PATIENT PROFILE ADULT - NSPROGENOTHERPROVIDER_GEN_A_NUR
[facility] INTERNAL MEDICINE  [id number] [facility] [address] [place] [place] [address]  Dept: [phone]  Dept Fax: [phone]  Loc: [phone]    Heidi Miranda is a 68 y.o. male who presents today for his medical conditions/complaints as noted below. Heidi Miranda is c/loly Suture / Staple Removal (Patient is here for follow up ED suture removal from finger left hand.)        HPI:     HPI   Sylvia Escalera returns today for suture removal;  Left index finger has healed nicely; No sign if infection;    Chief Complaint   Patient presents with    Suture / Staple Removal     Patient is here for follow up ED suture removal from finger left hand. Past Medical History:   Diagnosis Date    BPH with obstruction/lower urinary tract symptoms     Gastroesophageal reflux disease without esophagitis 5/6/2019    Hyperglycemia     Hyperlipidemia     Hypertension     Testicular hypofunction     Type 2 diabetes mellitus without complication [facility])       Past Surgical History:   Procedure Laterality Date    ANKLE SURGERY      HERNIA REPAIR      TONSILLECTOMY         Vitals 8/25/2020 8/17/2020 8/17/2020 8/15/2020 8/15/2020 0/18/2424   SYSTOLIC 758 257 256 596 - 929   DIASTOLIC 73 68 68 79 - 83   Site - - - - - -   Position - - - - - -   Pulse 60 - 60 59 - 70   Temp - - - - - 98.7   Resp - - - - - 20   SpO2 - - - - - 93   Weight - - 209 lb - - -   Height - - 6' 0\" - - -   BMI (wt*703/ht~2) - - 28.34 kg/m2 - - -   Pain Level - - - - 3 -   Some recent data might be hidden       Family History   Problem Relation Age of Onset    No Known Problems Mother     Heart Attack Father        Social History     Tobacco Use    Smoking status: Never Smoker    Smokeless tobacco: Never Used   Substance Use Topics    Alcohol use: No      Current Outpatient Medications   Medication Sig Dispense Refill    temazepam (RESTORIL) 15 MG capsule Take 2 capsules by mouth nightly as needed for Sleep for up to 30 days. 60 capsule 0    nystatin (MYCOSTATIN) 037624 UNIT/GM cream Apply topically 2 times daily. 30 g 1    spironolactone (ALDACTONE) 25 MG tablet TAKE 1 TABLET EVERY DAY 90 tablet 3    hydroCHLOROthiazide (HYDRODIURIL) 25 MG tablet TAKE 1 TABLET EVERY DAY 90 tablet 3    glipiZIDE (GLUCOTROL XL) 10 MG extended release tablet TAKE 1 TABLET EVERY MORNING 90 tablet 3    losartan (COZAAR) 100 MG tablet TAKE 1 TABLET EVERY DAY 90 tablet 3    potassium chloride (KLOR-CON M) 10 MEQ extended release tablet TAKE 2 TABLETS  2 TIMES DAILY 360 tablet 3    verapamil (CALAN SR) 240 MG extended release tablet TAKE 1 TABLET TWICE DAILY 180 tablet 3    atorvastatin (LIPITOR) 20 MG tablet TAKE 1 TABLET AT BEDTIME 90 tablet 3    fluticasone (FLONASE) 50 MCG/ACT nasal spray USE 1 SPRAY IN EACH NOSTRIL TWICE DAILY 48 g 3    pantoprazole (PROTONIX) 40 MG tablet TAKE 1 TABLET EVERY DAY 90 tablet 3    aspirin 81 MG tablet Take 81 mg by mouth daily      Omega-3 Fatty Acids (FISH OIL) 1000 MG CAPS Take 1,200 mg by mouth 2 times daily       No current facility-administered medications for this visit.       No Known Allergies    Health Maintenance   Topic Date Due    Shingles Vaccine (1 of 2) 05/11/2021 (Originally 12/16/1992)    Flu vaccine (1) 09/01/2020    Lipid screen  05/08/2021    Potassium monitoring  08/05/2021    Creatinine monitoring  08/05/2021    Annual Wellness Visit (AWV)  08/18/2021    Colon cancer screen colonoscopy  02/21/2025    DTaP/Tdap/Td vaccine (2 - Td) 08/15/2030    Pneumococcal 65+ years Vaccine  Completed    Hepatitis A vaccine  Aged Out    Hib vaccine  Aged Out    Meningococcal (ACWY) vaccine  Aged Out       Lab Results   Component Value Date    LABA1C 5.5 08/05/2020     Lab Results   Component Value Date    PSA 0.47 05/08/2020    PSA 0.41 05/02/2019    PSA 0.45 06/25/2018     TSH   Date Value Ref Range Status   08/05/2020 2.740 0.270 - 4.200 uIU/mL Final   ]  Lab Results   Component Value Date    NA 144 08/05/2020    K 4.5 08/05/2020     08/05/2020    CO2 27 08/05/2020    BUN 28 (H) 08/05/2020    CREATININE 0.8 08/05/2020    GLUCOSE 135 (H) 08/05/2020    CALCIUM 9.6 08/05/2020    PROT 6.7 08/05/2020    LABALBU 4.4 08/05/2020    BILITOT 0.5 08/05/2020    ALKPHOS 68 08/05/2020    AST 27 08/05/2020    ALT 48 (H) 08/05/2020    LABGLOM >60 08/05/2020    GFRAA >59 08/05/2020     Lab Results   Component Value Date    CHOL 151 (L) 05/08/2020    CHOL 151 (L) 02/07/2020    CHOL 145 (L) 08/01/2019     Lab Results   Component Value Date    TRIG 103 05/08/2020    TRIG 81 02/07/2020    TRIG 69 08/01/2019     Lab Results   Component Value Date    HDL 44 (L) 05/08/2020    HDL 49 (L) 02/07/2020    HDL 44 (L) 08/01/2019     Lab Results   Component Value Date    LDLCALC 86 05/08/2020    LDLCALC 86 02/07/2020    LDLCALC 87 08/01/2019     Lab Results   Component Value Date     08/05/2020    K 4.5 08/05/2020     08/05/2020    CO2 27 08/05/2020    BUN 28 08/05/2020    CREATININE 0.8 08/05/2020    GLUCOSE 135 08/05/2020    CALCIUM 9.6 08/05/2020      Lab Results   Component Value Date    WBC 6.1 08/05/2020    HGB 14.3 08/05/2020    HCT 42.8 08/05/2020    MCV 89.2 08/05/2020     08/05/2020    LABLYMP 1.36 04/07/2015    LYMPHOPCT 29.8 05/08/2020    RBC 4.80 08/05/2020    MCH 29.8 08/05/2020    MCHC 33.4 08/05/2020    RDW 12.8 08/05/2020     Lab Results   Component Value Date    VITD25 50.3 08/05/2020       Subjective:      Review of Systems   All other systems reviewed and are negative. Objective:     Physical Exam  Musculoskeletal:      Comments: Healed laceration to left index finger with sutures in place       /73   Pulse 60     Assessment:       Diagnosis Orders   1. Laceration of left index finger without foreign body without damage to nail, subsequent encounter     2. Visit for suture removal         Plan:        Patient given educational materials - see patient instructions.   Discussed use, benefit, and side effects of prescribed medications. Allpatient questions answered. Pt voiced understanding. Reviewed health maintenance. Instructed to continue current medications, diet and exercise. Patient agreed with treatment plan. Follow up as directed. MEDICATIONS:  No orders of the defined types were placed in this encounter. ORDERS:  No orders of the defined types were placed in this encounter. Follow-up:  No follow-ups on file. PATIENT INSTRUCTIONS:  Patient Instructions   1.suture removal left index finger;    Keep clean ;  Wear a bandaid; Take it off to wash your hands and replace when dry;      Electronically signed by MARISELA Peña on 8/25/2020 at 9:32 AM    EMRDragon/transcription disclaimer:  Much of this encounter note is electronic transcription/translation of spoken language to printed texts. The electronic translation of spoken language may be erroneous, or at times,nonsensical words or phrases may be inadvertently transcribed.   Although I have reviewed the note for such errors, some may still exist. none

## 2020-09-28 NOTE — H&P ADULT - NSHPSOURCEINFORD_GEN_ALL_CORE
Patient/Chart(s) Quality 431: Preventive Care And Screening: Unhealthy Alcohol Use - Screening: Patient screened for unhealthy alcohol use using a single question and scores less than 2 times per year Quality 47: Advance Care Plan: Advance care planning not documented, reason not otherwise specified. Quality 128: Preventive Care And Screening: Body Mass Index (Bmi) Screening And Follow-Up Plan: BMI not documented, reason not otherwise specified. Quality 110: Preventive Care And Screening: Influenza Immunization: Influenza Immunization Administered during Influenza season Quality 226: Preventive Care And Screening: Tobacco Use: Screening And Cessation Intervention: Patient screened for tobacco use and is an ex/non-smoker Quality 111:Pneumonia Vaccination Status For Older Adults: Pneumococcal Vaccination Previously Received Quality 130: Documentation Of Current Medications In The Medical Record: Current Medications Documented Detail Level: Detailed

## 2020-10-16 ENCOUNTER — RX RENEWAL (OUTPATIENT)
Age: 65
End: 2020-10-16

## 2020-10-28 ENCOUNTER — APPOINTMENT (OUTPATIENT)
Dept: CARDIOLOGY | Facility: CLINIC | Age: 65
End: 2020-10-28
Payer: MEDICARE

## 2020-10-28 VITALS
BODY MASS INDEX: 37.74 KG/M2 | HEART RATE: 70 BPM | SYSTOLIC BLOOD PRESSURE: 135 MMHG | WEIGHT: 213 LBS | DIASTOLIC BLOOD PRESSURE: 70 MMHG | HEIGHT: 63 IN

## 2020-10-28 PROCEDURE — 99072 ADDL SUPL MATRL&STAF TM PHE: CPT

## 2020-10-28 PROCEDURE — 93000 ELECTROCARDIOGRAM COMPLETE: CPT

## 2020-10-28 PROCEDURE — 99213 OFFICE O/P EST LOW 20 MIN: CPT

## 2020-11-16 ENCOUNTER — APPOINTMENT (OUTPATIENT)
Dept: CARDIOLOGY | Facility: CLINIC | Age: 65
End: 2020-11-16
Payer: MEDICARE

## 2020-11-16 VITALS
DIASTOLIC BLOOD PRESSURE: 80 MMHG | SYSTOLIC BLOOD PRESSURE: 110 MMHG | HEART RATE: 65 BPM | WEIGHT: 213 LBS | TEMPERATURE: 98.3 F | HEIGHT: 63 IN | BODY MASS INDEX: 37.74 KG/M2

## 2020-11-16 DIAGNOSIS — F17.210 NICOTINE DEPENDENCE, CIGARETTES, UNCOMPLICATED: ICD-10-CM

## 2020-11-16 PROCEDURE — 93284 PRGRMG EVAL IMPLANTABLE DFB: CPT

## 2020-11-16 PROCEDURE — 99072 ADDL SUPL MATRL&STAF TM PHE: CPT

## 2020-11-16 PROCEDURE — 99406 BEHAV CHNG SMOKING 3-10 MIN: CPT

## 2020-11-16 PROCEDURE — 99214 OFFICE O/P EST MOD 30 MIN: CPT

## 2020-11-16 PROCEDURE — 93000 ELECTROCARDIOGRAM COMPLETE: CPT | Mod: 59

## 2020-11-16 PROCEDURE — 93290 INTERROG DEV EVAL ICPMS IP: CPT | Mod: 26

## 2020-11-16 RX ORDER — METOPROLOL TARTRATE 75 MG/1
TABLET, FILM COATED ORAL
Refills: 0 | Status: DISCONTINUED | COMMUNITY
End: 2020-11-16

## 2020-11-16 RX ORDER — METFORMIN HYDROCHLORIDE 625 MG/1
TABLET ORAL
Refills: 0 | Status: DISCONTINUED | COMMUNITY
End: 2020-11-16

## 2020-11-16 RX ORDER — GLYCOPYRROLATE AND FORMOTEROL FUMARATE 9; 4.8 UG/1; UG/1
9-4.8 AEROSOL, METERED RESPIRATORY (INHALATION)
Refills: 0 | Status: DISCONTINUED | COMMUNITY
End: 2020-11-16

## 2020-11-16 RX ORDER — OMEGA-3-ACID ETHYL ESTERS 1 G/1
CAPSULE, LIQUID FILLED ORAL
Refills: 0 | Status: DISCONTINUED | COMMUNITY
End: 2020-11-16

## 2020-11-16 RX ORDER — VALSARTAN 320 MG/1
320 TABLET, COATED ORAL DAILY
Refills: 0 | Status: DISCONTINUED | COMMUNITY
End: 2020-11-16

## 2020-11-16 RX ORDER — AMIODARONE HYDROCHLORIDE 200 MG/1
200 TABLET ORAL DAILY
Qty: 90 | Refills: 3 | Status: DISCONTINUED | COMMUNITY
Start: 2020-08-24 | End: 2020-11-16

## 2020-11-16 RX ORDER — METFORMIN ER 750 MG 750 MG/1
750 TABLET ORAL DAILY
Refills: 0 | Status: DISCONTINUED | COMMUNITY
End: 2020-11-16

## 2020-11-16 RX ORDER — EZETIMIBE 10 MG/1
10 TABLET ORAL DAILY
Refills: 0 | Status: DISCONTINUED | COMMUNITY
End: 2020-11-16

## 2020-11-16 NOTE — PHYSICAL EXAM
[General Appearance - Well Developed] : well developed [Normal Appearance] : normal appearance [Well Groomed] : well groomed [General Appearance - Well Nourished] : well nourished [No Deformities] : no deformities [General Appearance - In No Acute Distress] : no acute distress [Heart Rate And Rhythm] : heart rate and rhythm were normal [Heart Sounds] : normal S1 and S2 [Murmurs] : no murmurs present [] : no respiratory distress [Respiration, Rhythm And Depth] : normal respiratory rhythm and effort [Exaggerated Use Of Accessory Muscles For Inspiration] : no accessory muscle use [Auscultation Breath Sounds / Voice Sounds] : lungs were clear to auscultation bilaterally [Left Infraclavicular] : left infraclavicular area [Healing Well] : healing well [Abdomen Soft] : soft [Nail Clubbing] : no clubbing of the fingernails

## 2020-11-16 NOTE — ASSESSMENT
[FreeTextEntry1] : Mr. Frey is a 64 yo M with history of NIDCM s/p BiV ICD, paroxysmal AFib on Amio and Eliquis who presents for routine cardiac follow up visit. \par \par NIDCM\par - BiV ICD with interrogation as listed above. Vector express done. Patient with best LV threshold from LV1 but he was experiencing phrenic. Changed vectors to LV2-RV coil. \par - Remote monitor is set up and patient is transmitting. Will transfer to me. \par - Stable Optivol, currently euvolemic\par - cont GDMT\par \par paroxysmal AFib\par - Cont Eliquis 5mg PO BID for CHADS VASc of at least 7 (CHF, HTN, Age DM, CAD). No signs/symptoms of bleeding.\par - No recurrence of AFib since device implant. Given COPD, d/c Amio\par - Patient refusing pulm referral despite COPD history\par \par HTN\par - BP well controlled\par - 2g Na diet enforced\par \par Tobacco use\par - advised pt to stop smoking\par \par I have also advised the patient to go to the nearest emergency room if he experiences any chest pain, dyspnea, syncope, or has any other compelling symptoms.\par \par Follow up in 9 months.

## 2020-11-16 NOTE — PROCEDURE
[No] : not [NSR] : normal sinus rhythm [CRT-D] : Cardiac resynchronization therapy defibrillator [DDD] : DDD [Voltage: ___ volts] : Voltage was [unfilled] volts [Charge Time: ___ sec] : charge time was [unfilled] seconds [Longevity: ___ months] : The estimated remaining battery life is [unfilled] months [Sensing Amplitude ___mv] : sensing amplitude was [unfilled] mv [Lead Imp:  ___ohms] : lead impedance was [unfilled] ohms [___V @] : [unfilled] V [___ ms] : [unfilled] ms [Programmed for Longevity] : output reprogrammed for improved battery longevity [Asense-Vsense ___ %] : Asense-Vsense [unfilled]% [Asense-Vpace ___ %] : Asense-Vpace [unfilled]% [Apace-Vsense ___ %] : Apace-Vsense [unfilled]% [Apace-Vpace ___ %] : Apace-Vpace [unfilled]% [See Device Printout] : See device printout [de-identified] : MEDTRONIC [de-identified] : JAYSON MRI [id number] [de-identified] : [id number] [de-identified] : 2-5-20 [de-identified] : 50 [de-identified] : changed LV pacing because patient was experiencing phrenic from LV1 pacing [de-identified] : NO EVENTS\par Stable Optivol

## 2020-11-16 NOTE — HISTORY OF PRESENT ILLNESS
[de-identified] : \par PCP Dr. Ceballos\par Cardiologist Dr. Lamar\par \par 66 yo M with history of syncope, HTN, paroxysmal AFib with RVR, CAD s/p PCI, ICM s/p BiV ICD implant in Feb 2020. He is here for routine follow up visit. \par \par The patient denies any cardiovascular complaints including chest pain, dyspnea, palpitations, dizziness, lightheadedness, presyncope or syncope.\par

## 2020-12-07 ENCOUNTER — RX RENEWAL (OUTPATIENT)
Age: 65
End: 2020-12-07

## 2021-01-26 ENCOUNTER — APPOINTMENT (OUTPATIENT)
Dept: CARDIOLOGY | Facility: CLINIC | Age: 66
End: 2021-01-26
Payer: MEDICARE

## 2021-01-26 PROCEDURE — 93880 EXTRACRANIAL BILAT STUDY: CPT

## 2021-01-26 PROCEDURE — 93000 ELECTROCARDIOGRAM COMPLETE: CPT

## 2021-01-26 PROCEDURE — 99213 OFFICE O/P EST LOW 20 MIN: CPT

## 2021-01-26 PROCEDURE — 99072 ADDL SUPL MATRL&STAF TM PHE: CPT

## 2021-02-17 ENCOUNTER — NON-APPOINTMENT (OUTPATIENT)
Age: 66
End: 2021-02-17

## 2021-02-17 ENCOUNTER — APPOINTMENT (OUTPATIENT)
Dept: CARDIOLOGY | Facility: CLINIC | Age: 66
End: 2021-02-17
Payer: MEDICARE

## 2021-02-17 PROCEDURE — 93296 REM INTERROG EVL PM/IDS: CPT

## 2021-02-17 PROCEDURE — 93295 DEV INTERROG REMOTE 1/2/MLT: CPT

## 2021-04-28 NOTE — ED PROVIDER NOTE - NS ED ROS FT
Constitutional: (-) fever (-) vomiting  Eyes/ENT: (-) eye discharge, (-) runny nose  Cardiovascular: (+) chest pain, (-) syncope  Respiratory: (-) cough, (-) shortness of breath  Gastrointestinal: (-) vomiting, (-) diarrhea, (-) abdominal pain  : (-) dysuria   Musculoskeletal: (-) neck pain, (-) back pain, (-) joint pain  Integumentary: (-) rash, (-) edema  Neurological: (-) headache, (-)loc  Allergic/Immunologic: (-) pruritus  Endocrine: No history of thyroid disease or diabetes. Yes

## 2021-05-17 ENCOUNTER — APPOINTMENT (OUTPATIENT)
Dept: CARDIOLOGY | Facility: CLINIC | Age: 66
End: 2021-05-17
Payer: MEDICARE

## 2021-05-17 VITALS
OXYGEN SATURATION: 98 % | HEART RATE: 76 BPM | WEIGHT: 215 LBS | DIASTOLIC BLOOD PRESSURE: 90 MMHG | HEIGHT: 63 IN | BODY MASS INDEX: 38.09 KG/M2 | SYSTOLIC BLOOD PRESSURE: 130 MMHG | TEMPERATURE: 97.7 F

## 2021-05-17 DIAGNOSIS — Z86.79 PERSONAL HISTORY OF OTHER DISEASES OF THE CIRCULATORY SYSTEM: ICD-10-CM

## 2021-05-17 DIAGNOSIS — F17.200 NICOTINE DEPENDENCE, UNSPECIFIED, UNCOMPLICATED: ICD-10-CM

## 2021-05-17 PROCEDURE — 93290 INTERROG DEV EVAL ICPMS IP: CPT | Mod: 26

## 2021-05-17 PROCEDURE — 93284 PRGRMG EVAL IMPLANTABLE DFB: CPT

## 2021-05-17 PROCEDURE — 99072 ADDL SUPL MATRL&STAF TM PHE: CPT

## 2021-05-17 PROCEDURE — 93000 ELECTROCARDIOGRAM COMPLETE: CPT | Mod: 59

## 2021-05-17 PROCEDURE — 99214 OFFICE O/P EST MOD 30 MIN: CPT | Mod: 25

## 2021-05-17 RX ORDER — INSULIN DEGLUDEC INJECTION 100 U/ML
INJECTION, SOLUTION SUBCUTANEOUS
Refills: 0 | Status: DISCONTINUED | COMMUNITY
End: 2021-05-17

## 2021-05-17 RX ORDER — INSULIN ASPART 100 [IU]/ML
INJECTION, SOLUTION INTRAVENOUS; SUBCUTANEOUS
Refills: 0 | Status: DISCONTINUED | COMMUNITY
End: 2021-05-17

## 2021-05-17 NOTE — PROCEDURE
[No] : not [NSR] : normal sinus rhythm [CRT-D] : Cardiac resynchronization therapy defibrillator [DDD] : DDD [Voltage: ___ volts] : Voltage was [unfilled] volts [Longevity: ___ months] : The estimated remaining battery life is [unfilled] months [Threshold Testing Performed] : Threshold testing was performed [Sensing Amplitude ___mv] : sensing amplitude was [unfilled] mv [Lead Imp:  ___ohms] : lead impedance was [unfilled] ohms [___V @] : [unfilled] V [___ ms] : [unfilled] ms [Programmed for Longevity] : output reprogrammed for improved battery longevity [Asense-Vsense ___ %] : Asense-Vsense [unfilled]% [Asense-Vpace ___ %] : Asense-Vpace [unfilled]% [Apace-Vsense ___ %] : Apace-Vsense [unfilled]% [Apace-Vpace ___ %] : Apace-Vpace [unfilled]% [See Device Printout] : See device printout [de-identified] : Medtronic [de-identified] : [id number] [de-identified] : [id number] [de-identified] : 2/5/2020 [de-identified] : 50 [de-identified] : 1 NSVT lasting 8 beats 2/13/21

## 2021-05-17 NOTE — REVIEW OF SYSTEMS
[Dyspnea on exertion] : dyspnea during exertion [Negative] : Heme/Lymph [FreeTextEntry5] : see HPI [FreeTextEntry9] : knee arthritis

## 2021-05-17 NOTE — PHYSICAL EXAM
[Normal Appearance] : normal appearance [Well Groomed] : well groomed [No Deformities] : no deformities [General Appearance - In No Acute Distress] : no acute distress [Heart Rate And Rhythm] : heart rate and rhythm were normal [Heart Sounds] : normal S1 and S2 [Murmurs] : no murmurs present [] : no respiratory distress [Respiration, Rhythm And Depth] : normal respiratory rhythm and effort [Exaggerated Use Of Accessory Muscles For Inspiration] : no accessory muscle use [Auscultation Breath Sounds / Voice Sounds] : lungs were clear to auscultation bilaterally [Left Infraclavicular] : left infraclavicular area [Healing Well] : healing well [Abdomen Soft] : soft [FreeTextEntry1] : obese [Nail Clubbing] : no clubbing of the fingernails

## 2021-05-17 NOTE — HISTORY OF PRESENT ILLNESS
[de-identified] : \par PCP Dr. Ceballos\par Cardiologist Dr. Lamar\par \par 67 yo M with history of syncope, HTN, paroxysmal AFib with RVR, CAD s/p PCI, ICM s/p BiV ICD implant in Feb 2020. He is here for routine follow up visit. He denies chest pain, palpitations, dizziness, lightheadedness, presyncope or syncope. He complains of dyspnea on exertion radha when climbing stairs. He still smokes a little less than a pack per day.

## 2021-05-19 ENCOUNTER — APPOINTMENT (OUTPATIENT)
Dept: CARDIOLOGY | Facility: CLINIC | Age: 66
End: 2021-05-19
Payer: MEDICARE

## 2021-05-19 ENCOUNTER — LABORATORY RESULT (OUTPATIENT)
Age: 66
End: 2021-05-19

## 2021-05-19 ENCOUNTER — NON-APPOINTMENT (OUTPATIENT)
Age: 66
End: 2021-05-19

## 2021-05-19 VITALS
TEMPERATURE: 97.8 F | WEIGHT: 215 LBS | SYSTOLIC BLOOD PRESSURE: 140 MMHG | HEART RATE: 76 BPM | DIASTOLIC BLOOD PRESSURE: 90 MMHG | BODY MASS INDEX: 38.09 KG/M2 | HEIGHT: 63 IN

## 2021-05-19 PROCEDURE — 93000 ELECTROCARDIOGRAM COMPLETE: CPT

## 2021-05-19 PROCEDURE — 93306 TTE W/DOPPLER COMPLETE: CPT

## 2021-05-19 PROCEDURE — 93295 DEV INTERROG REMOTE 1/2/MLT: CPT

## 2021-05-19 PROCEDURE — 99072 ADDL SUPL MATRL&STAF TM PHE: CPT

## 2021-05-19 PROCEDURE — 93296 REM INTERROG EVL PM/IDS: CPT

## 2021-05-19 PROCEDURE — 99214 OFFICE O/P EST MOD 30 MIN: CPT | Mod: 25

## 2021-05-20 ENCOUNTER — RESULT CHARGE (OUTPATIENT)
Age: 66
End: 2021-05-20

## 2021-06-08 NOTE — PATIENT PROFILE ADULT - NSPROPTRIGHTCAREGIVER_GEN_A_NUR
Re: [facility] approval by Oswaldo CHAVEZ [id number]   1/1/21 - 12/31/21    For 8 tabs. Called Andrey De Dios - brittney/ [employer] - once she gets Rx she will run test claim for to make sure Month 2 (7 tabs) goes through. I will fax the auth info to Ms Milli Flynnfer Alexander @ [phone]. For questions, may call Ms Morley Slade Alexander at [phone]    and ask Dr. Nerissa Khan to write 2 new Rx's:   for Mavenclad 10 mg     1 Rx for Month 1 for 8 tabs  1 Rx for Month 2 for 7 tabs    And give me the two Rx's so that I can fax them to Andrey De Dios @ [phone]. And please update Current med list - to show [facility] ([facility] drug only) and discontinue/remove Avonex and Ocrevus. declines

## 2021-06-11 NOTE — H&P PST ADULT - TEACHING/LEARNING LEARNING PREFERENCES
Called and talked to patient she is OK with Maxalt so new script sent to [address] [place] [address] for her. verbal instruction/individual instruction

## 2021-06-12 ENCOUNTER — LABORATORY RESULT (OUTPATIENT)
Age: 66
End: 2021-06-12

## 2021-06-12 ENCOUNTER — OUTPATIENT (OUTPATIENT)
Dept: OUTPATIENT SERVICES | Facility: HOSPITAL | Age: 66
LOS: 1 days | Discharge: HOME | End: 2021-06-12

## 2021-06-12 DIAGNOSIS — Z98.890 OTHER SPECIFIED POSTPROCEDURAL STATES: Chronic | ICD-10-CM

## 2021-06-12 DIAGNOSIS — Z95.5 PRESENCE OF CORONARY ANGIOPLASTY IMPLANT AND GRAFT: Chronic | ICD-10-CM

## 2021-06-12 DIAGNOSIS — Z95.2 PRESENCE OF PROSTHETIC HEART VALVE: Chronic | ICD-10-CM

## 2021-06-12 DIAGNOSIS — Z11.59 ENCOUNTER FOR SCREENING FOR OTHER VIRAL DISEASES: ICD-10-CM

## 2021-06-15 ENCOUNTER — INPATIENT (INPATIENT)
Facility: HOSPITAL | Age: 66
LOS: 0 days | Discharge: HOME | End: 2021-06-16
Attending: INTERNAL MEDICINE | Admitting: INTERNAL MEDICINE
Payer: MEDICARE

## 2021-06-15 ENCOUNTER — TRANSCRIPTION ENCOUNTER (OUTPATIENT)
Age: 66
End: 2021-06-15

## 2021-06-15 VITALS
WEIGHT: 214.95 LBS | OXYGEN SATURATION: 98 % | HEIGHT: 63 IN | SYSTOLIC BLOOD PRESSURE: 175 MMHG | DIASTOLIC BLOOD PRESSURE: 79 MMHG | HEART RATE: 81 BPM

## 2021-06-15 VITALS
RESPIRATION RATE: 18 BRPM | DIASTOLIC BLOOD PRESSURE: 84 MMHG | SYSTOLIC BLOOD PRESSURE: 137 MMHG | TEMPERATURE: 98 F | HEART RATE: 89 BPM

## 2021-06-15 DIAGNOSIS — Z95.2 PRESENCE OF PROSTHETIC HEART VALVE: Chronic | ICD-10-CM

## 2021-06-15 DIAGNOSIS — Z98.890 OTHER SPECIFIED POSTPROCEDURAL STATES: Chronic | ICD-10-CM

## 2021-06-15 DIAGNOSIS — Z95.5 PRESENCE OF CORONARY ANGIOPLASTY IMPLANT AND GRAFT: Chronic | ICD-10-CM

## 2021-06-15 LAB
ANION GAP SERPL CALC-SCNC: 12 MMOL/L — SIGNIFICANT CHANGE UP (ref 7–14)
BUN SERPL-MCNC: 20 MG/DL — SIGNIFICANT CHANGE UP (ref 10–20)
CALCIUM SERPL-MCNC: 9.7 MG/DL — SIGNIFICANT CHANGE UP (ref 8.5–10.1)
CHLORIDE SERPL-SCNC: 105 MMOL/L — SIGNIFICANT CHANGE UP (ref 98–110)
CO2 SERPL-SCNC: 26 MMOL/L — SIGNIFICANT CHANGE UP (ref 17–32)
CREAT SERPL-MCNC: 1.4 MG/DL — SIGNIFICANT CHANGE UP (ref 0.7–1.5)
GLUCOSE BLDC GLUCOMTR-MCNC: 109 MG/DL — HIGH (ref 70–99)
GLUCOSE SERPL-MCNC: 126 MG/DL — HIGH (ref 70–99)
HCT VFR BLD CALC: 50.7 % — SIGNIFICANT CHANGE UP (ref 42–52)
HGB BLD-MCNC: 16.9 G/DL — SIGNIFICANT CHANGE UP (ref 14–18)
MCHC RBC-ENTMCNC: 29.3 PG — SIGNIFICANT CHANGE UP (ref 27–31)
MCHC RBC-ENTMCNC: 33.3 G/DL — SIGNIFICANT CHANGE UP (ref 32–37)
MCV RBC AUTO: 87.9 FL — SIGNIFICANT CHANGE UP (ref 80–94)
NRBC # BLD: 0 /100 WBCS — SIGNIFICANT CHANGE UP (ref 0–0)
PLATELET # BLD AUTO: 219 K/UL — SIGNIFICANT CHANGE UP (ref 130–400)
POTASSIUM SERPL-MCNC: 3.9 MMOL/L — SIGNIFICANT CHANGE UP (ref 3.5–5)
POTASSIUM SERPL-SCNC: 3.9 MMOL/L — SIGNIFICANT CHANGE UP (ref 3.5–5)
RBC # BLD: 5.77 M/UL — SIGNIFICANT CHANGE UP (ref 4.7–6.1)
RBC # FLD: 12.5 % — SIGNIFICANT CHANGE UP (ref 11.5–14.5)
SODIUM SERPL-SCNC: 143 MMOL/L — SIGNIFICANT CHANGE UP (ref 135–146)
WBC # BLD: 10.43 K/UL — SIGNIFICANT CHANGE UP (ref 4.8–10.8)
WBC # FLD AUTO: 10.43 K/UL — SIGNIFICANT CHANGE UP (ref 4.8–10.8)

## 2021-06-15 PROCEDURE — 93312 ECHO TRANSESOPHAGEAL: CPT | Mod: 26

## 2021-06-15 PROCEDURE — 92928 PRQ TCAT PLMT NTRAC ST 1 LES: CPT | Mod: LD

## 2021-06-15 PROCEDURE — 93010 ELECTROCARDIOGRAM REPORT: CPT

## 2021-06-15 PROCEDURE — 93460 R&L HRT ART/VENTRICLE ANGIO: CPT | Mod: 26,XU

## 2021-06-15 PROCEDURE — 93571 IV DOP VEL&/PRESS C FLO 1ST: CPT | Mod: 26,LD

## 2021-06-15 RX ORDER — SACUBITRIL AND VALSARTAN 24; 26 MG/1; MG/1
1 TABLET, FILM COATED ORAL
Refills: 0 | Status: DISCONTINUED | OUTPATIENT
Start: 2021-06-15 | End: 2021-06-16

## 2021-06-15 RX ORDER — DEXTROSE 50 % IN WATER 50 %
25 SYRINGE (ML) INTRAVENOUS ONCE
Refills: 0 | Status: DISCONTINUED | OUTPATIENT
Start: 2021-06-15 | End: 2021-06-16

## 2021-06-15 RX ORDER — GLUCAGON INJECTION, SOLUTION 0.5 MG/.1ML
1 INJECTION, SOLUTION SUBCUTANEOUS ONCE
Refills: 0 | Status: DISCONTINUED | OUTPATIENT
Start: 2021-06-15 | End: 2021-06-16

## 2021-06-15 RX ORDER — FUROSEMIDE 40 MG
1 TABLET ORAL
Qty: 60 | Refills: 0
Start: 2021-06-15 | End: 2021-07-14

## 2021-06-15 RX ORDER — FUROSEMIDE 40 MG
1 TABLET ORAL
Qty: 0 | Refills: 0 | DISCHARGE

## 2021-06-15 RX ORDER — DEXTROSE 50 % IN WATER 50 %
15 SYRINGE (ML) INTRAVENOUS ONCE
Refills: 0 | Status: DISCONTINUED | OUTPATIENT
Start: 2021-06-15 | End: 2021-06-16

## 2021-06-15 RX ORDER — METOPROLOL TARTRATE 50 MG
200 TABLET ORAL DAILY
Refills: 0 | Status: DISCONTINUED | OUTPATIENT
Start: 2021-06-15 | End: 2021-06-16

## 2021-06-15 RX ORDER — CLOPIDOGREL BISULFATE 75 MG/1
75 TABLET, FILM COATED ORAL DAILY
Refills: 0 | Status: DISCONTINUED | OUTPATIENT
Start: 2021-06-16 | End: 2021-06-16

## 2021-06-15 RX ORDER — SODIUM CHLORIDE 9 MG/ML
1000 INJECTION INTRAMUSCULAR; INTRAVENOUS; SUBCUTANEOUS
Refills: 0 | Status: DISCONTINUED | OUTPATIENT
Start: 2021-06-15 | End: 2021-06-16

## 2021-06-15 RX ORDER — METFORMIN HYDROCHLORIDE 850 MG/1
1 TABLET ORAL
Qty: 0 | Refills: 0 | DISCHARGE

## 2021-06-15 RX ORDER — FENOFIBRATE,MICRONIZED 130 MG
145 CAPSULE ORAL AT BEDTIME
Refills: 0 | Status: DISCONTINUED | OUTPATIENT
Start: 2021-06-15 | End: 2021-06-16

## 2021-06-15 RX ORDER — AMIODARONE HYDROCHLORIDE 400 MG/1
200 TABLET ORAL DAILY
Refills: 0 | Status: DISCONTINUED | OUTPATIENT
Start: 2021-06-15 | End: 2021-06-16

## 2021-06-15 RX ORDER — INSULIN LISPRO 100/ML
VIAL (ML) SUBCUTANEOUS
Refills: 0 | Status: DISCONTINUED | OUTPATIENT
Start: 2021-06-15 | End: 2021-06-16

## 2021-06-15 RX ORDER — DEXTROSE 50 % IN WATER 50 %
12.5 SYRINGE (ML) INTRAVENOUS ONCE
Refills: 0 | Status: DISCONTINUED | OUTPATIENT
Start: 2021-06-15 | End: 2021-06-16

## 2021-06-15 RX ORDER — ATORVASTATIN CALCIUM 80 MG/1
40 TABLET, FILM COATED ORAL AT BEDTIME
Refills: 0 | Status: DISCONTINUED | OUTPATIENT
Start: 2021-06-15 | End: 2021-06-16

## 2021-06-15 RX ORDER — ASPIRIN/CALCIUM CARB/MAGNESIUM 324 MG
81 TABLET ORAL DAILY
Refills: 0 | Status: DISCONTINUED | OUTPATIENT
Start: 2021-06-16 | End: 2021-06-16

## 2021-06-15 RX ORDER — SODIUM CHLORIDE 9 MG/ML
1000 INJECTION, SOLUTION INTRAVENOUS
Refills: 0 | Status: DISCONTINUED | OUTPATIENT
Start: 2021-06-15 | End: 2021-06-16

## 2021-06-15 RX ORDER — APIXABAN 2.5 MG/1
1 TABLET, FILM COATED ORAL
Qty: 60 | Refills: 1
Start: 2021-06-15 | End: 2021-08-13

## 2021-06-15 RX ADMIN — ATORVASTATIN CALCIUM 40 MILLIGRAM(S): 80 TABLET, FILM COATED ORAL at 23:06

## 2021-06-15 RX ADMIN — Medication 145 MILLIGRAM(S): at 23:05

## 2021-06-15 RX ADMIN — SACUBITRIL AND VALSARTAN 1 TABLET(S): 24; 26 TABLET, FILM COATED ORAL at 18:55

## 2021-06-15 NOTE — DISCHARGE NOTE PROVIDER - CARE PROVIDERS DIRECT ADDRESSES
[email or web address]

## 2021-06-15 NOTE — DISCHARGE NOTE PROVIDER - CARE PROVIDER_API CALL
Nasir Barbosa (MD)  Cardiovascular Disease; Internal Medicine; Interventional Cardiology  [address] [place] [address]  Phone: [phone]  Fax: [phone]  Follow Up Time: 2 weeks    Martin Shaver  INTERNAL MEDICINE  [address] [place] [address]  Phone: [phone]  Fax: [phone]  Follow Up Time: 1 month

## 2021-06-15 NOTE — CHART NOTE - NSCHARTNOTEFT_GEN_A_CORE
PACU ANESTHESIA ADMISSION NOTE      Procedure:   Post op diagnosis:      ____  Intubated  TV:______       Rate: ______      FiO2: ______    _x___  Patent Airway    __x__  Full return of protective reflexes    _x___  Full recovery from anesthesia / back to baseline     Mental Status:  ___x_ Awake   _____ Alert   _____ Drowsy   _____ Sedated    Nausea/Vomiting:  __x__ NO  ______Yes,   See Post - Op Orders          Pain Scale (0-10):  _0____    Treatment: ____ None    ___x_ See Post - Op/PCA Orders    Post - Operative Fluids:   ____ Oral   ___x_ See Post - Op Orders    Plan: Discharge:   ____Home       _____Floor     _____Critical Care    _____  Other:_________________    Comments: Pt has recovered from anesthesia, no known complications.     Vitals:   T:           R:      15            BP:    173/55              Sat:     100              P: 82

## 2021-06-15 NOTE — DISCHARGE NOTE PROVIDER - NSDCCPCAREPLAN_GEN_ALL_CORE_FT
PRINCIPAL DISCHARGE DIAGNOSIS  Diagnosis: CAD (coronary artery disease)  Assessment and Plan of Treatment: Coronary artery disease (CAD) is narrowing of the arteries to your heart caused by a buildup of plaque. Plaque is made up of cholesterol and other substances. The narrowing in your arteries decreases the amount of blood that can flow to your heart. This causes your heart to get less oxygen.  Contact your healthcare provider if:  You have any of the following signs of a heart attack:  Squeezing, pressure, or pain in your chest  and any of the following:  Discomfort or pain in your back, neck, jaw, stomach, or arm  Shortness of breath  Nausea or vomiting  Lightheadedness or a sudden cold sweat  You have chest pain that is more frequent, or you have chest pain at rest.  You have questions or concerns about your condition or care.  Cholesterol medicines help lower blood cholesterol levels.  Nitrates , such as nitroglycerin, relax the arteries of your heart so it gets more oxygen. They help to relieve your chest pain.  Antiplatelets , such as aspirin, and plavix help prevent blood clots. Take your antiplatelet medicine exactly as directed. These medicines make it more likely for you to bleed or bruise. If you are told to take aspirin, do not take acetaminophen or ibuprofen instead.  Blood thinners help prevent blood clots. Examples of blood thinners include heparin and warfarin. Clots can cause strokes, heart attacks, and death. The following are general safety guidelines to follow while you are taking a blood thinner:  Watch for bleeding and bruising while you take blood thinners. Watch for bleeding from your gums or nose. Watch for blood in your urine and bowel movements. This can keep your skin and gums from bleeding. If you shave, use an electric shaver. Do not play contact sports.  Do not start or stop any medicines unless your healthcare provider tells you to.   Manage CAD:  Do not smoke. Nicotine and other chemicals in cigarettes and cigars can cause heart and lung damage.

## 2021-06-15 NOTE — H&P CARDIOLOGY - HISTORY OF PRESENT ILLNESS
65 y/o male presents here today for RHC/LHC and KUSH due to episodes of syncope      PMHx: syncope, HTN, paroxysmal AF w/ RVR, CAD s/p PCI x 2 1st diag and mLAD 10/19, s/p AICD placement, current everyday smoker      PSHx: Aortic valve replacement, AICD placement    Pre cath note:    indication:  [ ] STEMI                [ ] NSTEMI                 [ ] Acute coronary syndrome                     [ ]Unstable Angina   [ ] high risk  [ ] intermediate risk  [ ] low risk                     [ ] Stable Angina     non-invasive testing:                          Date:                     result: [ ] high risk  [ ] intermediate risk  [ ] low risk    Anti- Anginal medications:                    [ ] not used                       [ ] used                   [ ] not used but strong indication not to use    Ejection Fraction                   [ ] <29            [x ] 30-39%   [ ] 40-49%     [ ]>50%    CHF                   [ ] active (within last 14 days on meds   [ ] Chronic (on meds but no exacerbation)    COPD                   [ ] mild (on chronic bronchodilators)  [ ] moderate (on chronic steroid therapy)      [ ] severe (indication for home O2 or PACO2 >50)    Other risk factors:                       [ ] Previous MI                     [ ] CVA/ stroke                    [ ] carotid stent/ CEA                    [ ] PVD/PAD- (arterial aneurysm, non-palpable pulses, tortuous vessel with inability to insert catheter, infra-renal dissection, renal or subclavian artery stenosis)                    [x ] diabetic                    [ ] previous CABG                    [ ] Renal Failure       Cath bleeding risk: 2.6%    RIGHT RADIAL ARTERY EVALUATION:  JESE TEST:                              67 y/o male presents here today for RHC/LHC and KUSH due to increasing episodes dyspnea      PMHx: syncope, HTN, paroxysmal AF w/ RVR, CAD s/p PCI x 2 1st diag and mLAD 10/19, s/p AICD placement, current everyday smoker      PSHx: Aortic valve replacement, AICD placement    Pre cath note:    indication:  [ ] STEMI                [ ] NSTEMI                 [ ] Acute coronary syndrome                     [ ]Unstable Angina   [ ] high risk  [ ] intermediate risk  [ ] low risk                     [ ] Stable Angina     non-invasive testing:                          Date:                     result: [ ] high risk  [ ] intermediate risk  [ ] low risk    Anti- Anginal medications:                    [ ] not used                       [ ] used                   [ ] not used but strong indication not to use    Ejection Fraction                   [ ] <29            [x ] 30-39%   [ ] 40-49%     [ ]>50%    CHF                   [ ] active (within last 14 days on meds   [ ] Chronic (on meds but no exacerbation)    COPD                   [ ] mild (on chronic bronchodilators)  [ ] moderate (on chronic steroid therapy)      [ ] severe (indication for home O2 or PACO2 >50)    Other risk factors:                       [ ] Previous MI                     [ ] CVA/ stroke                    [ ] carotid stent/ CEA                    [ ] PVD/PAD- (arterial aneurysm, non-palpable pulses, tortuous vessel with inability to insert catheter, infra-renal dissection, renal or subclavian artery stenosis)                    [x ] diabetic                    [ ] previous CABG                    [ ] Renal Failure       Cath bleeding risk: 2.6%    RIGHT RADIAL ARTERY EVALUATION:  JESE TEST: WNL

## 2021-06-15 NOTE — DISCHARGE NOTE PROVIDER - NSDCMRMEDTOKEN_GEN_ALL_CORE_FT
acetaminophen-codeine 300 mg-15 mg oral tablet: 1 tab(s) orally 4 times a day, As Needed -for severe pain MDD:Six   do not drive while taking this medication  amiodarone 200 mg oral tablet:   Aspirin Enteric Coated 81 mg oral delayed release tablet: 1 tab(s) orally once a day MDD:1  atorvastatin 40 mg oral tablet: 1 tab(s) orally once a day (at bedtime)  Bevespi Aerosphere 9 mcg-4.8 mcg/inh inhalation aerosol: 2 puff(s) inhaled 2 times a day  clopidogrel 75 mg oral tablet: 1 tab(s) orally once a day  Eliquis 5 mg oral tablet: 1 tab(s) orally 2 times a day.  Can restart tomorrow 6/16  Entresto 24 mg-26 mg oral tablet: 1 tab(s) orally 2 times a day  ESZOPICLONE 3 MG TABLET: 1 tab(s) orally once a day  FENOFIBRATE 160 MG TABLET: 1 tab(s) orally once a day  furosemide 40 mg oral tablet: 1 tab(s) orally 2 times a day MDD:2  metFORMIN 750 mg oral tablet, extended release: 1 tab(s) orally once a day  HOLD 48 hours post cardiac cath  metoprolol succinate 200 mg oral capsule, extended release: 1 cap(s) orally once a day  nitroglycerin 0.4 mg sublingual tablet: 1 tab(s) sublingual every 5 minutes, As Needed  NovoLOG 100 units/mL injectable solution: 8 unit(s) injectable once a day  OMEGA-3 ETHYL ESTERS 1 GM CAP: 1 cap(s) orally once a day  Tresiba 100 units/mL subcutaneous solution: 150 unit(s) subcutaneous once a day  TRULICITY 1.5 MG/0.5 ML PEN: subcutaneous every 7 days

## 2021-06-15 NOTE — DISCHARGE NOTE PROVIDER - PROVIDER TOKENS
PROVIDER:[TOKEN:[[id number]],FOLLOWUP:[2 weeks]],PROVIDER:[TOKEN:[[id number]],FOLLOWUP:[1 month]]

## 2021-06-15 NOTE — CHART NOTE - NSCHARTNOTEFT_GEN_A_CORE
PRE-OP DIAGNOSIS: LESLIE, HTN, DM DL, CAD s/p PCI to LAD and RCA, AS s/p bioprosthetic AVR.     PROCEDURE: Right and  LHC with coronary angiography    Physician: Dr Barbosa   Assistant: Isaias    ANESTHESIA TYPE:  [  ]General Anesthesia  [  ] Sedation  [ x ] Local/Regional    ESTIMATED BLOOD LOSS:    10   mL    CONDITION  [  ] Critical  [  ] Serious  [  ]Fair  [ x ]Good      SPECIMENS REMOVED (IF APPLICABLE): N/A      IV CONTRAST:    100        mL      IMPLANTS (IF APPLICABLE)      FINDINGS        LEFT HEART CATHETERIZATION                                    Left main normal     LAD:  Prox patent stent, Mid Patent stent,  70% lesion distal to the old stent, Distal 70% apical lesion                       Diag: patent     Left Circumflex: Prox 60-70% , Distal small   OM:  moderate idsease     Right Coronary Artery: Prox/mid patent stents, Distal: moderate disease  RPDA mild disease  RPL mild disease      DOMINANCE: Right    ACCESS: right femoral artery, right femoral vein  CLOSURE:  sutured in place     INTERVENTION  mid LAD : iFR 0.82 significant                  Successful PCI with balloon angioplasty and MANN SYNERGY 2.75 x 20 mm     RIGHT HEART CATHETERIZATION  PA: 61/33  PCW: 25  CO/CI: 5.67/284      POST-OP DIAGNOSIS  moderate to severe pulm HTN, normal CO , elevated PCWP  Hemodynamically significant mid LAD lesion s/p successful PCI       PLAN OF CARE  [x ] Admit for observation  [x ]  Continue DAPT, B-blocker & Statin therapy  Gentle IV hydration   Lasix as needed  monitor kidney function   resume eliquis noemi if no bleed and patient is stable, as primary stroke prevention     Results of procedure/ plan of care discussed with patient/  in detail.

## 2021-06-15 NOTE — DISCHARGE NOTE PROVIDER - HOSPITAL COURSE
65 y/o male presents here today for RHC/LHC and KUSH due to increasing episodes dyspnea      PMHx: syncope, HTN, paroxysmal AF w/ RVR, CAD s/p PCI x 2 1st diag and mLAD 10/19, s/p AICD placement, current everyday smoker  LEFT HEART CATHETERIZATION                                    Left main normal     LAD:  Prox patent stent, Mid Patent stent,  70% lesion distal to the old stent, Distal 70% apical lesion                       Diag: patent     Left Circumflex: Prox 60-70% , Distal small   OM:  moderate idsease     Right Coronary Artery: Prox/mid patent stents, Distal: moderate disease  RPDA mild disease  RPL mild disease      DOMINANCE: Right    ACCESS: right femoral artery, right femoral vein  CLOSURE:  sutured in place     INTERVENTION  mid LAD : iFR 0.82 significant                  Successful PCI with balloon angioplasty and MANN SYNERGY 2.75 x 20 mm     POST-OP DIAGNOSIS  moderate to severe pulm HTN, normal CO , elevated PCWP  Hemodynamically significant mid LAD lesion s/p successful PCI     Patient is medically stable and wants to leave A. 67 y/o male presents here today for RHC/LHC and KUSH due to increasing episodes dyspnea      PMHx: syncope, HTN, paroxysmal AF w/ RVR, CAD s/p PCI x 2 1st diag and mLAD 10/19, s/p AICD placement, current everyday smoker    KUSH:  LVEF 35-40% with segmental WMAs  Normally functioning bioprosthetic AV  Mild MR, TR.    LEFT HEART CATHETERIZATION                                    Left main normal     LAD:  Prox patent stent, Mid Patent stent,  70% lesion distal to the old stent, Distal 70% apical lesion                       Diag: patent     Left Circumflex: Prox 60-70% , Distal small   OM:  moderate idsease     Right Coronary Artery: Prox/mid patent stents, Distal: moderate disease  RPDA mild disease  RPL mild disease      DOMINANCE: Right    ACCESS: right femoral artery, right femoral vein  CLOSURE:  sutured in place     INTERVENTION  mid LAD : iFR 0.82 significant                  Successful PCI with balloon angioplasty and MANN SYNERGY 2.75 x 20 mm     POST-OP DIAGNOSIS  moderate to severe pulm HTN, normal CO , elevated PCWP  Hemodynamically significant mid LAD lesion s/p successful PCI     Patient is medically stable and wants to leave [facility].

## 2021-06-15 NOTE — CHART NOTE - NSCHARTNOTEFT_GEN_A_CORE
POST OPERATIVE PROCEDURAL DOCUMENTATION  PRE-OP DIAGNOSIS:  Aortic stenosis    POST-OP DIAGNOSIS:  Bioprosthetic aortic valve functioning normally    PROCEDURE: Transesophageal echocardiogram    Primary Physician: Dr. Gutiérrez  Fellow: Dr. Chacko    ANESTHESIA TYPE  [] General Anesthesia  [x] Conscious Sedation  [] Local/Regional    CONDITION  [] Critical  [] Serious  [] Fair  [x] Good    SPECIMENS REMOVED (IF APPLICABLE): N/A    IMPLANTS (IF APPLICABLE): None    ESTIMATED BLOOD LOSS: None    COMPLICATIONS: None    FINDINGS:  After risks and benefits of procedures were explained, informed consent was obtained and placed in chart. Refer to Anesthesia note for sedation details. The KUSH probe was passed into the esophagus without difficulty. Transesophageal and transgastric images were obtained. The KUSH probe was removed without difficulty and examined. There was no evidence for bleeding. The patient tolerated the procedure well without any immediate KUSH-related complications.      Preliminary Findings:  LA: Moderately enlarged  RIGO: No evidence of clot or spontaneous contrast  LV: LVEF was around 35-40%   MV: Mild MR, no MS  AV: Bioprosthetic aortic valve functioning normally. Trivial AI, no AS  TV: Mild TR. No TS  PV: NWV  RA: Mildly enlarged  RV: Normal  IAS: No evidence of PFO. No R -> L shunt   Descending aorta & arch: There was moderate, calcified, non-mobile atheroma seen in the thoracic aorta    DIAGNOSIS/IMPRESSION:  Bioprosthetic aortic valve functioning normally    PLAN OF CARE:  - Discharge home when stable  - Follow-up with primary cardiologist in the office POST OPERATIVE PROCEDURAL DOCUMENTATION  PRE-OP DIAGNOSIS:  Aortic stenosis    POST-OP DIAGNOSIS:  Bioprosthetic aortic valve functioning normally    PROCEDURE: Transesophageal echocardiogram    Primary Physician: Dr. Gutiérrez  Fellow: Dr. Chacko    ANESTHESIA TYPE  [] General Anesthesia  [x] Conscious Sedation  [] Local/Regional    CONDITION  [] Critical  [] Serious  [] Fair  [x] Good    SPECIMENS REMOVED (IF APPLICABLE): N/A    IMPLANTS (IF APPLICABLE): None    ESTIMATED BLOOD LOSS: None    COMPLICATIONS: None    FINDINGS:  After risks and benefits of procedures were explained, informed consent was obtained and placed in chart. Refer to Anesthesia note for sedation details. The KUSH probe was passed into the esophagus without difficulty. Transesophageal and transgastric images were obtained. The KUSH probe was removed without difficulty and examined. There was no evidence for bleeding. The patient tolerated the procedure well without any immediate KUSH-related complications.      Preliminary Findings:  LA: Moderately enlarged  RIGO: Not visualized  LV: LVEF was around 35-40%   MV: Mild MR, no MS  AV: Bioprosthetic aortic valve functioning normally. Trivial AI, no AS  TV: Mild TR. No TS  PV: NWV  RA: Mildly enlarged  RV: Normal  IAS: No evidence of PFO. No R -> L shunt   Descending aorta & arch: There was moderate, calcified, non-mobile atheroma seen in the thoracic aorta    DIAGNOSIS/IMPRESSION:  Bioprosthetic aortic valve functioning normally    PLAN OF CARE:  - Discharge home when stable  - Follow-up with primary cardiologist in the office

## 2021-06-15 NOTE — H&P CARDIOLOGY - PMH
Afib    CAD (coronary artery disease)    CHF (congestive heart failure)    Chronic obstructive pulmonary disease (COPD)    Diabetes    Dyslipidemia    GERD (gastroesophageal reflux disease)    HTN (hypertension)    Stented coronary artery

## 2021-06-16 NOTE — DISCHARGE NOTE NURSING/CASE MANAGEMENT/SOCIAL WORK - PATIENT PORTAL LINK FT
You can access the FollowMyHealth Patient Portal offered by [facility] by registering at the following website: [email or web address]. By joining [facility]’s FollowMyHealth portal, you will also be able to view your health information using other applications (apps) compatible with our system.

## 2021-06-23 ENCOUNTER — APPOINTMENT (OUTPATIENT)
Dept: CARDIOLOGY | Facility: CLINIC | Age: 66
End: 2021-06-23
Payer: MEDICARE

## 2021-06-23 VITALS
SYSTOLIC BLOOD PRESSURE: 130 MMHG | HEIGHT: 63 IN | BODY MASS INDEX: 37.92 KG/M2 | HEART RATE: 82 BPM | DIASTOLIC BLOOD PRESSURE: 80 MMHG | WEIGHT: 214 LBS | TEMPERATURE: 97.8 F

## 2021-06-23 DIAGNOSIS — J44.9 CHRONIC OBSTRUCTIVE PULMONARY DISEASE, UNSPECIFIED: ICD-10-CM

## 2021-06-23 DIAGNOSIS — I27.20 PULMONARY HYPERTENSION, UNSPECIFIED: ICD-10-CM

## 2021-06-23 DIAGNOSIS — I25.10 ATHEROSCLEROTIC HEART DISEASE OF NATIVE CORONARY ARTERY WITHOUT ANGINA PECTORIS: ICD-10-CM

## 2021-06-23 DIAGNOSIS — F17.210 NICOTINE DEPENDENCE, CIGARETTES, UNCOMPLICATED: ICD-10-CM

## 2021-06-23 DIAGNOSIS — K21.9 GASTRO-ESOPHAGEAL REFLUX DISEASE WITHOUT ESOPHAGITIS: ICD-10-CM

## 2021-06-23 DIAGNOSIS — Z95.810 PRESENCE OF AUTOMATIC (IMPLANTABLE) CARDIAC DEFIBRILLATOR: ICD-10-CM

## 2021-06-23 DIAGNOSIS — I48.0 PAROXYSMAL ATRIAL FIBRILLATION: ICD-10-CM

## 2021-06-23 DIAGNOSIS — I11.0 HYPERTENSIVE HEART DISEASE WITH HEART FAILURE: ICD-10-CM

## 2021-06-23 DIAGNOSIS — Z95.3 PRESENCE OF XENOGENIC HEART VALVE: ICD-10-CM

## 2021-06-23 DIAGNOSIS — I50.9 HEART FAILURE, UNSPECIFIED: ICD-10-CM

## 2021-06-23 DIAGNOSIS — E11.9 TYPE 2 DIABETES MELLITUS WITHOUT COMPLICATIONS: ICD-10-CM

## 2021-06-23 DIAGNOSIS — Z79.4 LONG TERM (CURRENT) USE OF INSULIN: ICD-10-CM

## 2021-06-23 DIAGNOSIS — R06.00 DYSPNEA, UNSPECIFIED: ICD-10-CM

## 2021-06-23 DIAGNOSIS — Z88.2 ALLERGY STATUS TO SULFONAMIDES: ICD-10-CM

## 2021-06-23 DIAGNOSIS — E78.5 HYPERLIPIDEMIA, UNSPECIFIED: ICD-10-CM

## 2021-06-23 DIAGNOSIS — Z95.5 PRESENCE OF CORONARY ANGIOPLASTY IMPLANT AND GRAFT: ICD-10-CM

## 2021-06-23 PROCEDURE — 99072 ADDL SUPL MATRL&STAF TM PHE: CPT

## 2021-06-23 PROCEDURE — 99214 OFFICE O/P EST MOD 30 MIN: CPT

## 2021-06-23 PROCEDURE — 93000 ELECTROCARDIOGRAM COMPLETE: CPT

## 2021-08-13 ENCOUNTER — LABORATORY RESULT (OUTPATIENT)
Age: 66
End: 2021-08-13

## 2021-08-18 ENCOUNTER — APPOINTMENT (OUTPATIENT)
Dept: CARDIOLOGY | Facility: CLINIC | Age: 66
End: 2021-08-18
Payer: MEDICARE

## 2021-08-18 ENCOUNTER — NON-APPOINTMENT (OUTPATIENT)
Age: 66
End: 2021-08-18

## 2021-08-18 VITALS
BODY MASS INDEX: 38.8 KG/M2 | TEMPERATURE: 98.1 F | HEIGHT: 63 IN | WEIGHT: 219 LBS | SYSTOLIC BLOOD PRESSURE: 110 MMHG | HEART RATE: 88 BPM | DIASTOLIC BLOOD PRESSURE: 70 MMHG

## 2021-08-18 VITALS
HEIGHT: 63 IN | WEIGHT: 212 LBS | DIASTOLIC BLOOD PRESSURE: 84 MMHG | TEMPERATURE: 97.8 F | SYSTOLIC BLOOD PRESSURE: 130 MMHG | HEART RATE: 78 BPM | BODY MASS INDEX: 37.56 KG/M2

## 2021-08-18 PROCEDURE — 93295 DEV INTERROG REMOTE 1/2/MLT: CPT

## 2021-08-18 PROCEDURE — 93296 REM INTERROG EVL PM/IDS: CPT

## 2021-08-18 PROCEDURE — 93000 ELECTROCARDIOGRAM COMPLETE: CPT

## 2021-08-18 PROCEDURE — 99214 OFFICE O/P EST MOD 30 MIN: CPT

## 2021-09-15 ENCOUNTER — APPOINTMENT (OUTPATIENT)
Dept: CARDIOLOGY | Facility: CLINIC | Age: 66
End: 2021-09-15
Payer: MEDICARE

## 2021-09-15 PROCEDURE — 93306 TTE W/DOPPLER COMPLETE: CPT

## 2021-10-04 ENCOUNTER — RX RENEWAL (OUTPATIENT)
Age: 66
End: 2021-10-04

## 2021-11-08 ENCOUNTER — RX RENEWAL (OUTPATIENT)
Age: 66
End: 2021-11-08

## 2021-11-17 ENCOUNTER — APPOINTMENT (OUTPATIENT)
Dept: CARDIOLOGY | Facility: CLINIC | Age: 66
End: 2021-11-17
Payer: MEDICARE

## 2021-11-17 ENCOUNTER — NON-APPOINTMENT (OUTPATIENT)
Age: 66
End: 2021-11-17

## 2021-11-17 PROCEDURE — 93296 REM INTERROG EVL PM/IDS: CPT | Mod: NC

## 2021-11-17 PROCEDURE — 93295 DEV INTERROG REMOTE 1/2/MLT: CPT

## 2021-12-16 ENCOUNTER — APPOINTMENT (OUTPATIENT)
Dept: CARDIOLOGY | Facility: CLINIC | Age: 66
End: 2021-12-16
Payer: MEDICARE

## 2021-12-16 PROCEDURE — 99214 OFFICE O/P EST MOD 30 MIN: CPT

## 2021-12-16 PROCEDURE — 93000 ELECTROCARDIOGRAM COMPLETE: CPT

## 2021-12-23 NOTE — PRE-OP CHECKLIST - INTERNAL PROSTHESES
Patient Education     Prevention Guidelines, Women Ages 40 to 49  Screening tests and vaccines are an important part of managing your health. A screening test is done to find possible disorders or diseases in people who don't have any symptoms. The goal is to find a disease early so lifestyle changes can be made and you can be watched more closely to reduce the risk of disease, or to detect it early enough to treat it most effectively. Screening tests are not considered diagnostic, but are used to determine if more testing is needed. Health counseling is essential, too. Below are guidelines for these, for women ages 40 to 49. Talk with your healthcare provider to make sure you’re up-to-date on what you need.  Screening Who needs it How often   Type 2 diabetes or prediabetes All women beginning at age 45 and women without symptoms at any age who are overweight or obese and have 1 or more additional risk factors for diabetes At least every 3 years1   Type 2 diabetes or prediabetes All women diagnosed with gestational diabetes Lifelong testing every 3 years   Type 2 diabetes All women with prediabetes Every year   Alcohol misuse All women in this age group At routine exams   Blood pressure All women in this age group Yearly checkup if your blood pressure is normal  Normal blood pressure is less than 120/80 mm Hg  If your blood pressure reading is higher than normal, follow the advice of your healthcare provider   Breast cancer All women at average risk in this age group Screening with a mammogram can start at age 40.2 Talk with your healthcare provider to help you decide when to start screening. At age 45 start yearly mammograms.3    Cervical cancer All women in this age group, except women who have had a complete hysterectomy Pap test every 3 years or Pap test plus human papilloma virus (HPV) test every 5 years   Chlamydia Women at increased risk for infection At routine exams if you're at risk or have symptoms    Depression All women in this age group At routine exams   Gonorrhea Sexually active women at increased risk for infection At routine exams   Hepatitis C Anyone at increased risk; 1 time for those born between 1945 and 1965 At routine exams   High cholesterol or triglycerides All women ages 45 and older who are at risk for coronary artery disease; younger women, talk with your healthcare provider At least every 5 years   HIV All women At routine exams. Those with risk factors for HIV should be tested at least annually.   Obesity All women in this age group At routine exams   Syphilis Women at increased risk for infection-talk with your healthcare provider At routine exams   Tuberculosis Women at increased risk for infection-talk with your healthcare provider Ask your healthcare provider   Vision All women in this age group Complete exam at age 40 and eye exams every 2 to 4 years. If you have a chronic disease, ask your healthcare provider how often you should have your eyes examined.4   Vaccine Who needs it How often   Chickenpox (varicella) All women in this age group who have no record of this infection or vaccine 2 doses; the second dose should be given at least 4 weeks after the first dose   Hepatitis A Women at increased risk for infection-talk with your healthcare provider 2 doses given 6 months apart   Hepatitis B Women at increased risk for infection-talk with your healthcare provider 3 doses over 6 months; second dose should be given 1 month after the first dose; the third dose should be given at least 2 months after the second dose and at least 4 months after the first dose   Haemophilus influenzae Type B (HIB) Women at increased risk 1 to 3 doses   Influenza (flu) All women in this age group Once a year   Measles, mumps, rubella (MMR) All women in this age group who have no record of these infections or vaccines 1 or 2 doses   Meningococcal Women at increased risk for infection-talk with your healthcare  provider 1 or more doses   Pneumococcal conjugate vaccine (PCV13) and pneumococcal polysaccharide vaccine (PPSV23) Women at increased risk for infection-talk with your healthcare provider 1 or 2 doses   Tetanus/diphtheria/pertussis (Td/Tdap) booster All women in this age group A one-time dose of Tdap instead of a Td booster after age 18, then Td every 10 years   Counseling Who needs it How often   BRCA gene mutation testing for breast and ovarian cancer susceptibility Women with increased risk for having gene mutation When your risk is known   Breast cancer and chemoprevention Women at high risk for breast cancer When your risk is known   Diet and exercise Women who are overweight or obese When diagnosed, and then at routine exams   Domestic violence Women at the age in which they are able to have children At routine exams   Sexually transmitted infection prevention Women at increased risk for infection-talk with your healthcare provider At routine exams   Use of tobacco and the health effects it can cause All women in this age group Every exam   1American Diabetes Association  2American College of Obstetricians and Gynecologists   3American Cancer Society  4American Academy of Ophthalmology  Date Last Reviewed: 11/1/2017 © 2000-2018 [employer]. [address] [place] [id number]. All rights reserved. This information is not intended as a substitute for professional medical care. Always follow your healthcare professional's instructions.            no

## 2022-01-05 NOTE — PATIENT PROFILE ADULT - NSPROPTRIGHTBILLOFRIGHTS_GEN_A_NUR
Dr. Dodd paged to assess patient's perineum, as there appeared to be an open area near the perineal repair towards left labia.  Dr. Dodd reassured patient that area would heal properly.  No further orders.  Patient understanding of plan of care.    patient

## 2022-02-23 ENCOUNTER — APPOINTMENT (OUTPATIENT)
Dept: CARDIOLOGY | Facility: CLINIC | Age: 67
End: 2022-02-23
Payer: MEDICARE

## 2022-02-23 VITALS
HEIGHT: 63 IN | HEART RATE: 82 BPM | WEIGHT: 216 LBS | DIASTOLIC BLOOD PRESSURE: 83 MMHG | SYSTOLIC BLOOD PRESSURE: 148 MMHG | BODY MASS INDEX: 38.27 KG/M2 | TEMPERATURE: 97.2 F

## 2022-02-23 DIAGNOSIS — Z95.810 PRESENCE OF AUTOMATIC (IMPLANTABLE) CARDIAC DEFIBRILLATOR: ICD-10-CM

## 2022-02-23 PROCEDURE — 99214 OFFICE O/P EST MOD 30 MIN: CPT | Mod: 25

## 2022-02-23 PROCEDURE — 93284 PRGRMG EVAL IMPLANTABLE DFB: CPT

## 2022-02-23 PROCEDURE — 93000 ELECTROCARDIOGRAM COMPLETE: CPT | Mod: 59

## 2022-02-23 PROCEDURE — 93290 INTERROG DEV EVAL ICPMS IP: CPT | Mod: 26

## 2022-02-23 NOTE — PROCEDURE
[No] : not [NSR] : normal sinus rhythm [See Device Printout] : See device printout [CRT-D] : Cardiac resynchronization therapy defibrillator [DDD] : DDD [Voltage: ___ volts] : Voltage was [unfilled] volts [Charge Time: ___ sec] : charge time was [unfilled] seconds [Longevity: ___ months] : The estimated remaining battery life is [unfilled] months [Threshold Testing Performed] : Threshold testing was performed [Sensing Amplitude ___mv] : sensing amplitude was [unfilled] mv [Lead Imp:  ___ohms] : lead impedance was [unfilled] ohms [___V @] : [unfilled] V [___ ms] : [unfilled] ms [Programmed for Longevity] : output reprogrammed for improved battery longevity [Asense-Vsense ___ %] : Asense-Vsense [unfilled]% [Asense-Vpace ___ %] : Asense-Vpace [unfilled]% [Apace-Vsense ___ %] : Apace-Vsense [unfilled]% [Apace-Vpace ___ %] : Apace-Vpace [unfilled]% [de-identified] : Medtronic [de-identified] : [id number] [de-identified] : [id number] [de-identified] : 2/5/2020 [de-identified] : 50 [de-identified] : 5 NSVT, longest lasting 4 seconds, V rate 195 bpm.\par Effective 96.7% pacing\par Stable Optivol

## 2022-02-23 NOTE — HISTORY OF PRESENT ILLNESS
[de-identified] : \par PCP Dr. Ceballos\par Cardiologist Dr. Lamar\par \par 68 yo M with history of syncope, HTN, paroxysmal AFib with RVR, CAD s/p PCI, ICM s/p BiV ICD implant in Feb 2020. He is here for routine follow up visit. He denies chest pain, palpitations, dizziness, lightheadedness, presyncope or syncope. He complains of dyspnea on exertion radha when climbing stairs. He still smokes about a pack per day and is not interested in quitting. He takes his Eliquis once a day. When describing to him the importance of taking it twice a day to help prevent a stroke, he states, "Doc, you're wasting your breath on me."

## 2022-02-23 NOTE — CARDIOLOGY SUMMARY
[de-identified] : 2/23/2022 A-sensed, BiV paced (HR 82 bpm) [de-identified] : 9/15/21 EF 35%. Elevated LVEDP. Mild LAE. Bioprosthesis in aortic position. Normal structure and function of aortic prostehsis. \par 1/7/20, Mod LAE ,mild aortic root dilatation. LVEF 30-35%.  [de-identified] : 6/15/2021 MANN to 70% mid LAD. Mod-severe to pulm HTN. \par 10/2/19, 2v CAD (LAD and RCA). Prox LAD with 85% stenosis s/p PCI, 85% 1st Diag s/p PCI, 80% mid LAD stenosis s/p PCI

## 2022-02-23 NOTE — ASSESSMENT
[FreeTextEntry1] : Mr. Frey is a 66 yo M with history of NIDCM s/p BiV ICD (2/5/20), paroxysmal AFib on Eliquis who presents for routine cardiac follow up visit. \par \par # NIDCM\par - BiV ICD with interrogation as listed above. Normal functioning device. NSVT noted. Cath last year with PCI. \par - Remote monitor is set up and patient is transmitting.  \par - Stable Optivol. Currently euvolemic\par - Cont GDMT\par \par # Paroxysmal AFib\par - Advised pt on importance of taking Eliquis 5mg PO BID for CHADS VASc of at least 7 (CHF, HTN, Age DM, CAD). No signs/symptoms of bleeding. Patient refuses to take it twice and states, "doc, you are wasting your breath on me."\par - No recurrence of AFib since device implant. \par - Patient refusing pulm referral despite COPD history\par \par # Tobacco Use\par - Strongly advised pt on tobacco cessation but he is not interested in quitting. \par \par # HTN\par - BP mildly elevated\par - 2g Na diet enforced\par \par \par I have also advised the patient to go to the nearest emergency room if he experiences any chest pain, dyspnea, syncope, or has any other compelling symptoms.\par \par Follow up in 6-9 months with NP. \par

## 2022-03-24 ENCOUNTER — APPOINTMENT (OUTPATIENT)
Dept: CARDIOLOGY | Facility: CLINIC | Age: 67
End: 2022-03-24
Payer: MEDICARE

## 2022-03-24 ENCOUNTER — LABORATORY RESULT (OUTPATIENT)
Age: 67
End: 2022-03-24

## 2022-03-24 DIAGNOSIS — J44.9 CHRONIC OBSTRUCTIVE PULMONARY DISEASE, UNSPECIFIED: ICD-10-CM

## 2022-03-24 PROCEDURE — 93000 ELECTROCARDIOGRAM COMPLETE: CPT

## 2022-03-24 PROCEDURE — 99214 OFFICE O/P EST MOD 30 MIN: CPT

## 2022-03-26 PROBLEM — J44.9 COPD (CHRONIC OBSTRUCTIVE PULMONARY DISEASE): Status: ACTIVE | Noted: 2020-07-31

## 2022-05-25 ENCOUNTER — APPOINTMENT (OUTPATIENT)
Dept: CARDIOLOGY | Facility: CLINIC | Age: 67
End: 2022-05-25
Payer: MEDICARE

## 2022-05-25 ENCOUNTER — NON-APPOINTMENT (OUTPATIENT)
Age: 67
End: 2022-05-25

## 2022-05-25 PROCEDURE — 93296 REM INTERROG EVL PM/IDS: CPT

## 2022-05-25 PROCEDURE — 93295 DEV INTERROG REMOTE 1/2/MLT: CPT

## 2022-06-23 ENCOUNTER — LABORATORY RESULT (OUTPATIENT)
Age: 67
End: 2022-06-23

## 2022-06-23 ENCOUNTER — APPOINTMENT (OUTPATIENT)
Dept: CARDIOLOGY | Facility: CLINIC | Age: 67
End: 2022-06-23
Payer: MEDICARE

## 2022-06-23 PROCEDURE — 99214 OFFICE O/P EST MOD 30 MIN: CPT

## 2022-06-23 PROCEDURE — 93000 ELECTROCARDIOGRAM COMPLETE: CPT

## 2022-06-23 RX ORDER — APIXABAN 5 MG/1
5 TABLET, FILM COATED ORAL DAILY
Qty: 90 | Refills: 3 | Status: DISCONTINUED | COMMUNITY
Start: 2020-12-07 | End: 2022-06-23

## 2022-08-24 ENCOUNTER — APPOINTMENT (OUTPATIENT)
Dept: CARDIOLOGY | Facility: CLINIC | Age: 67
End: 2022-08-24

## 2022-08-24 ENCOUNTER — NON-APPOINTMENT (OUTPATIENT)
Age: 67
End: 2022-08-24

## 2022-08-24 PROCEDURE — 93295 DEV INTERROG REMOTE 1/2/MLT: CPT

## 2022-08-24 PROCEDURE — 93296 REM INTERROG EVL PM/IDS: CPT

## 2022-09-28 ENCOUNTER — APPOINTMENT (OUTPATIENT)
Dept: CARDIOLOGY | Facility: CLINIC | Age: 67
End: 2022-09-28

## 2022-09-28 PROCEDURE — 93000 ELECTROCARDIOGRAM COMPLETE: CPT

## 2022-09-28 PROCEDURE — 99214 OFFICE O/P EST MOD 30 MIN: CPT | Mod: 25

## 2022-09-30 ENCOUNTER — APPOINTMENT (OUTPATIENT)
Dept: CARDIOLOGY | Facility: CLINIC | Age: 67
End: 2022-09-30

## 2022-09-30 PROCEDURE — 93923 UPR/LXTR ART STDY 3+ LVLS: CPT

## 2022-11-10 ENCOUNTER — APPOINTMENT (OUTPATIENT)
Dept: CARDIOLOGY | Facility: CLINIC | Age: 67
End: 2022-11-10

## 2022-11-10 VITALS
SYSTOLIC BLOOD PRESSURE: 148 MMHG | OXYGEN SATURATION: 98 % | HEIGHT: 63 IN | HEART RATE: 78 BPM | DIASTOLIC BLOOD PRESSURE: 84 MMHG | WEIGHT: 209.6 LBS | BODY MASS INDEX: 37.14 KG/M2

## 2022-11-10 PROCEDURE — 99213 OFFICE O/P EST LOW 20 MIN: CPT

## 2022-11-10 PROCEDURE — 93925 LOWER EXTREMITY STUDY: CPT

## 2022-11-10 RX ORDER — FENOFIBRATE 145 MG/1
TABLET ORAL
Refills: 0 | Status: DISCONTINUED | COMMUNITY
End: 2022-11-10

## 2022-11-10 RX ORDER — BUDESONIDE AND FORMOTEROL FUMARATE DIHYDRATE 160; 4.5 UG/1; UG/1
AEROSOL RESPIRATORY (INHALATION)
Refills: 0 | Status: DISCONTINUED | COMMUNITY
End: 2022-11-10

## 2022-11-10 NOTE — REVIEW OF SYSTEMS
[Dyspnea on exertion] : dyspnea during exertion [Chest Discomfort] : no chest discomfort [Lower Ext Edema] : no extremity edema [Leg Claudication] : intermittent leg claudication [Palpitations] : no palpitations [Orthopnea] : no orthopnea [Syncope] : no syncope [Negative] : Heme/Lymph

## 2022-11-10 NOTE — CARDIOLOGY SUMMARY
[de-identified] : \par TTE 09/15/21 EF 35 %, Elevated LVEDP, Mild LAE, Bioprosthesis in aortic position normal, mild aortic root dilatation. \par \par 06/28/22 Chol 111 Trig 264 LDL 33 Hgb A1C 7.5 \par  [de-identified] : 09/30/22 PVR r inflow disease, R distal sup femoral Artery and /or popliteal artery disease\par LE arterial US 11/10/22: R distal SFA/popliteal artery >75% stenosis

## 2022-11-10 NOTE — REVIEW OF SYSTEMS
[Dyspnea on exertion] : dyspnea during exertion [Leg Claudication] : intermittent leg claudication [Negative] : Genitourinary [Chest Discomfort] : no chest discomfort [Lower Ext Edema] : no extremity edema [Palpitations] : no palpitations [Orthopnea] : no orthopnea [Syncope] : no syncope [Joint Pain] : joint pain [Easy Bleeding] : a tendency for easy bleeding

## 2022-11-10 NOTE — HISTORY OF PRESENT ILLNESS
[FreeTextEntry1] : Pt is 67 year old Male with PMH of HL, HTN, Pulm HTN, CAD, s/p PCI, ICM, s/pBiV ICD implant in Feb 2020, COPD, Afib with RVR. Pt smokes 1 pack per day and is not willing to quit.   Pt is here for f/u.  Pt c.o BLE pain when he walks.  Denies pain when is not active. Pt is with hx of non compliance with medications. Pt verbalized that he wishes not to have anymore medical procedures, tests, wishes not to see doctors any longer.  As per pt he is mostly home bound.  Refuses further testing\par TTE 09/15/21 EF 35 %, Elevated LVEDP, Mild LAE, Bioprosthesis in aortic position normal, mild aortic root dilatation. \par 09/30/22 PVR r inflow disease, R distal sup femoral Artery and /or popliteal artery disease\par 06/28/22 Chol 111 Trig 264 LDL 33 Hgb A1C 7.5

## 2022-11-10 NOTE — PHYSICAL EXAM
[Well Developed] : well developed [Well Nourished] : well nourished [No Acute Distress] : no acute distress [Normal Venous Pressure] : normal venous pressure [No Carotid Bruit] : no carotid bruit [Normal S1, S2] : normal S1, S2 [No Murmur] : no murmur [No Rub] : no rub [No Gallop] : no gallop [Clear Lung Fields] : clear lung fields [Good Air Entry] : good air entry [No Respiratory Distress] : no respiratory distress  [Soft] : abdomen soft [Non Tender] : non-tender [No Masses/organomegaly] : no masses/organomegaly [Normal Bowel Sounds] : normal bowel sounds [Moves all extremities] : moves all extremities [No Focal Deficits] : no focal deficits [Normal Speech] : normal speech [No ulcers] : no ulcers [No edema] : no edema [No varicosities] : no varicosities [No chronic venous stasis changes] : no chronic venous stasis changes [No cyanosis] : no cyanosis [No rashes] : no rashes [Diminished] : diminished bilaterally

## 2022-11-10 NOTE — ASSESSMENT
[FreeTextEntry1] : Assessment:\par #PAD\par #CAD\par #CHF EF 35%\par \par Plan:\par - Pt is refusing further testing \par - Teaching is provided regarding importance for further testing\par    and possible intervention, regarding progression of PAD \par - Smoking cessations is emphasized. \par - Teaching is provided to increase his walking \par - Pt is instructed to inspect his feet and legs for unhealed wounds\par - F/u with podiatrist is recommended -  pt refused \par - Pt is advised to call office immediately if BLEs pain is \par   progressing or new wounds occur. \par - Pt is advised to call office if he changes his mind regarding further\par   testing and interventions \par - Pt refused to make F/u appointment

## 2022-11-10 NOTE — HISTORY OF PRESENT ILLNESS
[FreeTextEntry1] : 67M with HLD, HTN, Pulm HTN, CAD s/p PCI, ischemic CMP s/pBiV ICD implant in Feb 2020, COPD, Afib, active smoker here for PAD evaluation. \par \par Pt smokes 1 pack per day and is not willing to quit. \par \par Pt c.o BLE pain when he walks.  Denies pain when is not active. Pt verbalized that he wishes not to have anymore medical procedures, tests, wishes not to see doctors any longer.  As per pt he is mostly home bound.  Declines further testing\par \par Cardiologist: Dr. Khalif Lamar

## 2022-11-10 NOTE — ASSESSMENT
[FreeTextEntry1] : Assessment:\par #PAD, [place] 3\par - LE arterial US suggestive of inflow disease, severe R distal SFA/pop artery disease\par #CAD\par #CHF EF 35%\par #Active smoker\par \par Plan:\par - Recommend further evaluation with CT angio aorta with lower extremity runoff, but patient declines further workup\par - Continue aggressive risk factor reduction with lifestyle modification and medical management\par - Teaching provided regarding benefits of walk therapy for PAD\par - Smoking cessation\par - Pt  to inspect his feet and legs for unhealed wounds, he should call if he develops a lower extremity wound \par - Encouraged him to establish care with a podiatrist for thickened toenails \par - Return to clinic in 2-3 months, patient declined follow-up at this time. He does not wish to see so many physicians\par \par \par I, Dr. Card, personally performed the evaluation and management (E/M) services for this established patient who presents today with (a) new problem(s)/exacerbation of (an) existing condition(s). That E/M includes conducting the clinically appropriate interval history &/or exam, assessing all new/exacerbated conditions, and establishing a new plan of care. Today, ELPIDIO Dubose was here to observe &/or participate in the visit & follow plan of care established by me. \par \par \par \par \par \par \par

## 2022-11-10 NOTE — PHYSICAL EXAM
[Well Developed] : well developed [Well Nourished] : well nourished [No Acute Distress] : no acute distress [No Carotid Bruit] : no carotid bruit [Normal S1, S2] : normal S1, S2 [No Murmur] : no murmur [No Rub] : no rub [No Gallop] : no gallop [Clear Lung Fields] : clear lung fields [Good Air Entry] : good air entry [No Respiratory Distress] : no respiratory distress  [Soft] : abdomen soft [Non Tender] : non-tender [No Masses/organomegaly] : no masses/organomegaly [Normal Bowel Sounds] : normal bowel sounds [Moves all extremities] : moves all extremities [No Focal Deficits] : no focal deficits [Normal Speech] : normal speech [No edema] : no edema [No rashes] : no rashes [de-identified] : declines vascular exam

## 2022-11-16 ENCOUNTER — APPOINTMENT (OUTPATIENT)
Dept: CARDIOLOGY | Facility: CLINIC | Age: 67
End: 2022-11-16

## 2022-11-16 VITALS
SYSTOLIC BLOOD PRESSURE: 140 MMHG | WEIGHT: 208 LBS | DIASTOLIC BLOOD PRESSURE: 80 MMHG | RESPIRATION RATE: 16 BRPM | HEIGHT: 63 IN | BODY MASS INDEX: 36.86 KG/M2 | TEMPERATURE: 97.1 F | HEART RATE: 78 BPM

## 2022-11-16 PROCEDURE — 93000 ELECTROCARDIOGRAM COMPLETE: CPT | Mod: 59

## 2022-11-16 PROCEDURE — 93284 PRGRMG EVAL IMPLANTABLE DFB: CPT

## 2022-11-29 ENCOUNTER — EMERGENCY (EMERGENCY)
Facility: HOSPITAL | Age: 67
LOS: 0 days | Discharge: LEFT BEFORE DISCHARGE | End: 2022-11-29
Attending: EMERGENCY MEDICINE | Admitting: EMERGENCY MEDICINE

## 2022-11-29 VITALS
TEMPERATURE: 97 F | SYSTOLIC BLOOD PRESSURE: 124 MMHG | OXYGEN SATURATION: 96 % | DIASTOLIC BLOOD PRESSURE: 63 MMHG | HEART RATE: 82 BPM | RESPIRATION RATE: 20 BRPM

## 2022-11-29 VITALS
HEART RATE: 84 BPM | HEIGHT: 63 IN | DIASTOLIC BLOOD PRESSURE: 92 MMHG | RESPIRATION RATE: 20 BRPM | WEIGHT: 220.02 LBS | SYSTOLIC BLOOD PRESSURE: 155 MMHG | OXYGEN SATURATION: 97 % | TEMPERATURE: 98 F

## 2022-11-29 DIAGNOSIS — Z95.5 PRESENCE OF CORONARY ANGIOPLASTY IMPLANT AND GRAFT: Chronic | ICD-10-CM

## 2022-11-29 DIAGNOSIS — E78.5 HYPERLIPIDEMIA, UNSPECIFIED: ICD-10-CM

## 2022-11-29 DIAGNOSIS — Z88.2 ALLERGY STATUS TO SULFONAMIDES: ICD-10-CM

## 2022-11-29 DIAGNOSIS — R05.9 COUGH, UNSPECIFIED: ICD-10-CM

## 2022-11-29 DIAGNOSIS — Z79.01 LONG TERM (CURRENT) USE OF ANTICOAGULANTS: ICD-10-CM

## 2022-11-29 DIAGNOSIS — Z79.84 LONG TERM (CURRENT) USE OF ORAL HYPOGLYCEMIC DRUGS: ICD-10-CM

## 2022-11-29 DIAGNOSIS — Z95.810 PRESENCE OF AUTOMATIC (IMPLANTABLE) CARDIAC DEFIBRILLATOR: ICD-10-CM

## 2022-11-29 DIAGNOSIS — E11.9 TYPE 2 DIABETES MELLITUS WITHOUT COMPLICATIONS: ICD-10-CM

## 2022-11-29 DIAGNOSIS — I10 ESSENTIAL (PRIMARY) HYPERTENSION: ICD-10-CM

## 2022-11-29 DIAGNOSIS — Z95.2 PRESENCE OF PROSTHETIC HEART VALVE: Chronic | ICD-10-CM

## 2022-11-29 DIAGNOSIS — Z79.02 LONG TERM (CURRENT) USE OF ANTITHROMBOTICS/ANTIPLATELETS: ICD-10-CM

## 2022-11-29 DIAGNOSIS — Z53.29 PROCEDURE AND TREATMENT NOT CARRIED OUT BECAUSE OF PATIENT'S DECISION FOR OTHER REASONS: ICD-10-CM

## 2022-11-29 DIAGNOSIS — Z79.82 LONG TERM (CURRENT) USE OF ASPIRIN: ICD-10-CM

## 2022-11-29 DIAGNOSIS — I49.9 CARDIAC ARRHYTHMIA, UNSPECIFIED: ICD-10-CM

## 2022-11-29 DIAGNOSIS — Z98.890 OTHER SPECIFIED POSTPROCEDURAL STATES: Chronic | ICD-10-CM

## 2022-11-29 DIAGNOSIS — U07.1 COVID-19: ICD-10-CM

## 2022-11-29 DIAGNOSIS — R06.02 SHORTNESS OF BREATH: ICD-10-CM

## 2022-11-29 LAB
ALBUMIN SERPL ELPH-MCNC: 4.1 G/DL — SIGNIFICANT CHANGE UP (ref 3.5–5.2)
ALP SERPL-CCNC: 50 U/L — SIGNIFICANT CHANGE UP (ref 30–115)
ALT FLD-CCNC: 48 U/L — HIGH (ref 0–41)
ANION GAP SERPL CALC-SCNC: 13 MMOL/L — SIGNIFICANT CHANGE UP (ref 7–14)
AST SERPL-CCNC: 43 U/L — HIGH (ref 0–41)
BASOPHILS # BLD AUTO: 0.03 K/UL — SIGNIFICANT CHANGE UP (ref 0–0.2)
BASOPHILS NFR BLD AUTO: 0.5 % — SIGNIFICANT CHANGE UP (ref 0–1)
BILIRUB SERPL-MCNC: 0.7 MG/DL — SIGNIFICANT CHANGE UP (ref 0.2–1.2)
BUN SERPL-MCNC: 17 MG/DL — SIGNIFICANT CHANGE UP (ref 10–20)
CALCIUM SERPL-MCNC: 9.7 MG/DL — SIGNIFICANT CHANGE UP (ref 8.4–10.4)
CHLORIDE SERPL-SCNC: 101 MMOL/L — SIGNIFICANT CHANGE UP (ref 98–110)
CO2 SERPL-SCNC: 24 MMOL/L — SIGNIFICANT CHANGE UP (ref 17–32)
CREAT SERPL-MCNC: 1.4 MG/DL — SIGNIFICANT CHANGE UP (ref 0.7–1.5)
D DIMER BLD IA.RAPID-MCNC: 228 NG/ML DDU — SIGNIFICANT CHANGE UP (ref 0–230)
EGFR: 55 ML/MIN/1.73M2 — LOW
EOSINOPHIL # BLD AUTO: 0.18 K/UL — SIGNIFICANT CHANGE UP (ref 0–0.7)
EOSINOPHIL NFR BLD AUTO: 2.9 % — SIGNIFICANT CHANGE UP (ref 0–8)
FLUAV AG NPH QL: SIGNIFICANT CHANGE UP
FLUBV AG NPH QL: SIGNIFICANT CHANGE UP
GLUCOSE SERPL-MCNC: 189 MG/DL — HIGH (ref 70–99)
HCT VFR BLD CALC: 48.5 % — SIGNIFICANT CHANGE UP (ref 42–52)
HGB BLD-MCNC: 17 G/DL — SIGNIFICANT CHANGE UP (ref 14–18)
IMM GRANULOCYTES NFR BLD AUTO: 0.6 % — HIGH (ref 0.1–0.3)
LYMPHOCYTES # BLD AUTO: 1.2 K/UL — SIGNIFICANT CHANGE UP (ref 1.2–3.4)
LYMPHOCYTES # BLD AUTO: 19 % — LOW (ref 20.5–51.1)
MAGNESIUM SERPL-MCNC: 2 MG/DL — SIGNIFICANT CHANGE UP (ref 1.8–2.4)
MCHC RBC-ENTMCNC: 30.1 PG — SIGNIFICANT CHANGE UP (ref 27–31)
MCHC RBC-ENTMCNC: 35.1 G/DL — SIGNIFICANT CHANGE UP (ref 32–37)
MCV RBC AUTO: 86 FL — SIGNIFICANT CHANGE UP (ref 80–94)
MONOCYTES # BLD AUTO: 0.68 K/UL — HIGH (ref 0.1–0.6)
MONOCYTES NFR BLD AUTO: 10.8 % — HIGH (ref 1.7–9.3)
NEUTROPHILS # BLD AUTO: 4.17 K/UL — SIGNIFICANT CHANGE UP (ref 1.4–6.5)
NEUTROPHILS NFR BLD AUTO: 66.2 % — SIGNIFICANT CHANGE UP (ref 42.2–75.2)
NRBC # BLD: 0 /100 WBCS — SIGNIFICANT CHANGE UP (ref 0–0)
NT-PROBNP SERPL-SCNC: 1308 PG/ML — HIGH (ref 0–300)
PLATELET # BLD AUTO: 207 K/UL — SIGNIFICANT CHANGE UP (ref 130–400)
POTASSIUM SERPL-MCNC: 4.4 MMOL/L — SIGNIFICANT CHANGE UP (ref 3.5–5)
POTASSIUM SERPL-SCNC: 4.4 MMOL/L — SIGNIFICANT CHANGE UP (ref 3.5–5)
PROT SERPL-MCNC: 6.7 G/DL — SIGNIFICANT CHANGE UP (ref 6–8)
RBC # BLD: 5.64 M/UL — SIGNIFICANT CHANGE UP (ref 4.7–6.1)
RBC # FLD: 12.5 % — SIGNIFICANT CHANGE UP (ref 11.5–14.5)
RSV RNA NPH QL NAA+NON-PROBE: SIGNIFICANT CHANGE UP
SARS-COV-2 RNA SPEC QL NAA+PROBE: DETECTED
SODIUM SERPL-SCNC: 138 MMOL/L — SIGNIFICANT CHANGE UP (ref 135–146)
TROPONIN T SERPL-MCNC: 0.03 NG/ML — CRITICAL HIGH
WBC # BLD: 6.3 K/UL — SIGNIFICANT CHANGE UP (ref 4.8–10.8)
WBC # FLD AUTO: 6.3 K/UL — SIGNIFICANT CHANGE UP (ref 4.8–10.8)

## 2022-11-29 PROCEDURE — 71045 X-RAY EXAM CHEST 1 VIEW: CPT | Mod: 26

## 2022-11-29 PROCEDURE — 93010 ELECTROCARDIOGRAM REPORT: CPT

## 2022-11-29 PROCEDURE — 99285 EMERGENCY DEPT VISIT HI MDM: CPT

## 2022-11-29 RX ORDER — IPRATROPIUM/ALBUTEROL SULFATE 18-103MCG
3 AEROSOL WITH ADAPTER (GRAM) INHALATION
Refills: 0 | Status: COMPLETED | OUTPATIENT
Start: 2022-11-29 | End: 2022-11-29

## 2022-11-29 RX ADMIN — Medication 3 MILLILITER(S): at 14:19

## 2022-11-29 RX ADMIN — Medication 3 MILLILITER(S): at 14:35

## 2022-11-29 RX ADMIN — Medication 3 MILLILITER(S): at 14:24

## 2022-11-29 NOTE — ED PROVIDER NOTE - PATIENT PORTAL LINK FT
You can access the FollowMyHealth Patient Portal offered by [facility] by registering at the following website: [email or web address]. By joining [facility]’s FollowMyHealth portal, you will also be able to view your health information using other applications (apps) compatible with our system.

## 2022-11-29 NOTE — ED PROVIDER NOTE - NS ED ROS FT
Constitutional: Negative for fever, chills, and fatigue.  HENT: + nasal congestion. Negative for headache, hearing change, ear pain, and sore throat.  Eyes: Negative for eye pain, eye discharge, foreign body sensation, and vision change.  Cardiovascular: Negative for chest pain, and palpitation.  Respiratory: + cough and SOB. Negative for wheezing  Gastrointestinal: Negative for nausea, vomiting, abdominal pain, constipation, diarrhea, hematochezia, and melena.  Genitourinary: Negative for flank pain, dysuria, frequency, and hematuria.  Neurological: Negative for dizziness, syncope, and loss of consciousness.  Musculoskeletal: Negative for joint swelling, arthralgias, back pain, neck pain, and calf cramps.  Hematological: Does not bruise/bleed easily.

## 2022-11-29 NOTE — ED PROVIDER NOTE - CLINICAL SUMMARY MEDICAL DECISION MAKING FREE TEXT BOX
67-year-old male with PMHx as noted, in ER with c/o of not feeling well for the past 2 weeks.  Labs reviewed: CBC unremarkable, D-dimer negative, troponin 0.03, BNP 1308.  CXR with no acute infiltrate.  EKG with paced rhythm.  Results of labs and imaging discussed with patient.  Recommended for patient to stay for repeat troponin, continue cardiac monitoring.  However patient does not want to stay in the hospital any longer.  Feels better after nebulizer, wants to go home and follow-up as outpatient.  Patient told that work-up incomplete, and that leaving could result in death/permanent disability.  Patient understands but still wants to go.  To sign out AMA.  Patient told he can return to ER at any time to continue his evaluation.

## 2022-11-29 NOTE — ED PROVIDER NOTE - OBJECTIVE STATEMENT
67-year-old male with past medical history of hypertension, hyperlipidemia, diabetes, arrhythmia with AICD and on Eliquis who presents with shortness of breath, coughing, and nasal congestion.  Reports that he has been having the symptoms since 2 weeks ago; patient has tried over-the-counter medications, but did not provide much relief with his symptoms.  Reports that he spoke with someone from his cardiologist office and was told to come to the ED to rule out blood clot.  Denies fever, nausea, vomiting, abdominal pain, urinary symptoms, and change in bowel movement.

## 2022-11-29 NOTE — ED ADULT NURSE NOTE - IN THE PAST 12 MONTHS HAVE YOU USED DRUGS OTHER THAN THOSE REQUIRED FOR MEDICAL REASON?
[facility]  coding opportunities       Chart reviewed, no opportunity found: CHART REVIEWED, NO OPPORTUNITY FOUND        Patients Insurance        Commercial Insurance: [employer]

## 2022-11-29 NOTE — ED ADULT NURSE NOTE - EXTENSIONS OF SELF_ADULT
Telephone Encounter by Maggie Schuler RN at 09/15/17 02:20 PM     Author:  Maggie Schuler RN Service:  (none) Author Type:  Registered Nurse     Filed:  09/15/17 02:21 PM Encounter Date:  9/15/2017 Status:  Signed     :  Maggie Schuler RN (Registered Nurse)            Mom notified of Selin's recommendations and states understanding.   Number for allergy given.   Mom will wait for urine culture results before starting antibiotic. She is having no urinary symptoms.[MB1.1M]       Revision History        User Key Date/Time User Provider Type Action    > MB1.1 09/15/17 02:21 PM Maggie Schuler RN Registered Nurse Sign    M - Manual             None

## 2022-11-29 NOTE — ED PROVIDER NOTE - PROGRESS NOTE DETAILS
Labs shows elevated trop. Pt has been recommended to stay in the hospital, but declined and willing to AMA. Risks discussed and still wants to AMA.

## 2022-11-29 NOTE — ED PROVIDER NOTE - NSFOLLOWUPINSTRUCTIONS_ED_ALL_ED_FT
Please make sure to follow up with your primary care doctor in 3 days.    You have requested to leave the ED against medical advice. I believe you are of sound mind and competent to refuse medical care. You have been advised of the risks of leaving AMA which include but are not limited to death, coma, transient or permanent disability, delay in diagnosis, need for repeat emergency department, or other complications related to your specific problem today. You have been advised that should you change your mind, you are totally welcome and encouraged to return to the emergency department at any time. In no way does an AMA discharge mean that we do not want you to have the best medical care available (ie. Referrals or prescriptions, as necessary).      Shortness of breath could indicate a medical problem. Causes include lung diseases, heart disease, low amount of red blood cells (anemia), poor physical fitness, being overweight, smoking, etc. Your health care provider may not be able to find a cause for your shortness of breath after your exam. In this case, it is important to have a follow-up exam with your primary care physician as instructed. If medicines were prescribed, take them as directed for the full length of time directed. Refrain from tobacco products.    SEEK IMMEDIATE MEDICAL CARE IF YOU HAVE THE FOLLOWING SYMPTOMS: worsening shortness of breath, chest pain, back pain, abdominal pain, fever, coughing up blood, lightheadedness/dizziness.

## 2022-11-29 NOTE — ED PROVIDER NOTE - PHYSICAL EXAMINATION
CONSTITUTIONAL: Well-appearing; in no apparent distress.   HEAD: Normocephalic; atraumatic.   EYES: Pupils are round and reactive, extra-ocular muscles are intact. Eyelids are normal in appearance without swelling or lesions.   ENT: Hearing is intact with good acuity to spoken voice.  Patient is speaking clearly, not muffled and airway is intact.   RESPIRATORY: Crackles noticed in all lobes. No signs of respiratory distress.   CARDIOVASCULAR: Regular rate and rhythm.   GI: Abdomen is soft, non-tender, and without distention. Bowel sounds are present and normoactive in all four quadrants. No masses are noted.   NEURO: A & O x 3. Normal speech. No focal deficit.  PSYCHOLOGICAL: Appropriate mood and affect. Good judgement and insight.

## 2022-11-29 NOTE — ED PROVIDER NOTE - NS ED ATTENDING STATEMENT MOD
This was a shared visit with the [facility]. I reviewed and verified the documentation and independently performed the documented:

## 2022-11-29 NOTE — ED PROVIDER NOTE - ATTENDING APP SHARED VISIT CONTRIBUTION OF CARE
68 y/o male with h/o htn, hld, dm, cad s/p stents, afib on eliquis, s/p AVR,  s/p AICD,  + current every day smoker,  in ER with c/o not feeling well for the past ~ 2 weeks.  + non-productive cough.  + congestion. + occasional SOB.  no f/c.  denies any CP.  + sick contacts at home. no LE swelling.  no abd pain.  no n/v/d.  no ha/dizziness/loc.   PE - nad, nc/at, eomi, perrl, op - clear, mmm, no resp distress, + normal WOB, + b/l rhonchi, rrr, abd- soft, nt/nd, nabs, from x 4, no LE swelling/tenderness, A&O x 3, no focal neuro deficits.  -check labs, cxr, nasal swab,

## 2022-12-01 NOTE — HISTORY OF PRESENT ILLNESS
[de-identified] : \par PCP Dr. Ceballos\par Cardiologist Dr. Lamar\par \par 66 yo M with history of syncope, HTN, paroxysmal AFib with RVR, CAD s/p PCI, ICM s/p BiV ICD implant in Feb 2020. He is here for routine follow up visit. He denies chest pain, palpitations, dizziness, lightheadedness, presyncope or syncope. He complains of dyspnea on exertion radha when climbing stairs. He still smokes about a pack per day and is not interested in quitting. He takes his Eliquis once a day. He remains resistant about taking the eliquis every 12 hours despite explanation.

## 2022-12-01 NOTE — PHYSICAL EXAM
[Normal Appearance] : normal appearance [Well Groomed] : well groomed [No Deformities] : no deformities [General Appearance - In No Acute Distress] : no acute distress [Heart Rate And Rhythm] : heart rate and rhythm were normal [Heart Sounds] : normal S1 and S2 [Murmurs] : no murmurs present [] : no respiratory distress [Respiration, Rhythm And Depth] : normal respiratory rhythm and effort [Exaggerated Use Of Accessory Muscles For Inspiration] : no accessory muscle use [Auscultation Breath Sounds / Voice Sounds] : lungs were clear to auscultation bilaterally [Left Infraclavicular] : left infraclavicular area [Healing Well] : healing well [Abdomen Soft] : soft [Nail Clubbing] : no clubbing of the fingernails [FreeTextEntry1] : obese

## 2022-12-01 NOTE — ASSESSMENT
[FreeTextEntry1] : Mr. Frey is a 66 yo M with history of NIDCM s/p BiV ICD (2/5/20), paroxysmal AFib on Eliquis who presents for routine cardiac follow up visit. \par \par # NIDCM\par - BiV ICD with interrogation as listed above. Normal functioning device. NSVT noted. Cath last year with PCI. \par - Remote monitor is set up and patient is transmitting.  \par - Stable Optivol. Currently euvolemic\par - Cont GDMT\par - Reassesed  diff LV vector efficacy for battery longevity d/t high LV threshold. all possible other vectors showed diaphragmatic pacing. Kept on current vector  (LV2 to RV coil)\par \par # Paroxysmal AFib\par - Advised pt on importance of taking Eliquis 5mg PO BID for CHADS VASc of at least 7 (CHF, HTN, Age DM, CAD).  No signs/symptoms of bleeding. Patient refuses to take it twice daily.\par - AF burden <0.1 %\par - Patient refusing pulm referral despite COPD history\par \par # Tobacco Use\par - Strongly advised pt on tobacco cessation but he is not interested in quitting. \par \par # HTN\par - BP mildly elevated\par - 2g Na diet enforced\par \par \par I have also advised the patient to go to the nearest emergency room if he experiences any chest pain, dyspnea, syncope, or has any other compelling symptoms.\par \par Follow up in 6-9 months with NP. \par

## 2022-12-01 NOTE — CARDIOLOGY SUMMARY
[de-identified] : 11/16/2022 - 78 bpm Bi V paced  \par 2/23/2022 A-sensed, BiV paced (HR 82 bpm) [de-identified] : 9/15/21 EF 35%. Elevated LVEDP. Mild LAE. Bioprosthesis in aortic position. Normal structure and function of aortic prostehsis. \par 1/7/20, Mod LAE ,mild aortic root dilatation. LVEF 30-35%.  [de-identified] : 6/15/2021 MANN to 70% mid LAD. Mod-severe to pulm HTN. \par 10/2/19, 2v CAD (LAD and RCA). Prox LAD with 85% stenosis s/p PCI, 85% 1st Diag s/p PCI, 80% mid LAD stenosis s/p PCI

## 2022-12-01 NOTE — PROCEDURE
[No] : not [NSR] : normal sinus rhythm [See Device Printout] : See device printout [CRT-D] : Cardiac resynchronization therapy defibrillator [DDD] : DDD [___V @] : [unfilled] V [___ ms] : [unfilled] ms [Programmed for Longevity] : output reprogrammed for improved battery longevity [Apace-Vsense ___ %] : Apace-Vsense [unfilled]% [Apace-Vpace ___ %] : Apace-Vpace [unfilled]% [Voltage: ___ volts] : Voltage was [unfilled] volts [Charge Time: ___ sec] : charge time was [unfilled] seconds [Longevity: ___ months] : The estimated remaining battery life is [unfilled] months [Threshold Testing Performed] : Threshold testing was performed [Atrial] : Atrial [Ventricular] : Ventricular [Sensing Amplitude ___mv] : sensing amplitude was [unfilled] mv [Lead Imp:  ___ohms] : lead impedance was [unfilled] ohms [None] : none [Asense-Vsense ___ %] : Asense-Vsense [unfilled]% [Asense-Vpace ___ %] : Asense-Vpace [unfilled]% [de-identified] : 76 BPM [de-identified] : Medtronic [de-identified] : [id number] [de-identified] : [id number] [de-identified] : 2/5/2020 [de-identified] :  [de-identified] : 8 NSVT, longest lasting 1 seconds, V rate 214 bpm.\par Effective 95.6% pacing\par Stable Optivol\par Transmitting on [facility]

## 2022-12-21 ENCOUNTER — RX RENEWAL (OUTPATIENT)
Age: 67
End: 2022-12-21

## 2022-12-22 ENCOUNTER — APPOINTMENT (OUTPATIENT)
Dept: CARDIOLOGY | Facility: CLINIC | Age: 67
End: 2022-12-22

## 2022-12-22 VITALS
SYSTOLIC BLOOD PRESSURE: 140 MMHG | DIASTOLIC BLOOD PRESSURE: 76 MMHG | HEIGHT: 63 IN | WEIGHT: 205.4 LBS | BODY MASS INDEX: 36.39 KG/M2 | HEART RATE: 79 BPM | OXYGEN SATURATION: 97 %

## 2022-12-22 DIAGNOSIS — I73.9 PERIPHERAL VASCULAR DISEASE, UNSPECIFIED: ICD-10-CM

## 2022-12-22 PROCEDURE — 99214 OFFICE O/P EST MOD 30 MIN: CPT

## 2022-12-22 NOTE — CARDIOLOGY SUMMARY
[de-identified] : TTE 09/15/21 EF 35 %, Elevated LVEDP, Mild LAE, Bioprosthesis in aortic position normal, mild aortic root dilatation. \par \par \par  [de-identified] : 09/30/22 PVR r inflow disease, R distal sup femoral Artery and /or popliteal artery disease\par LE arterial US 11/10/22: R distal SFA/popliteal artery >75% stenosis

## 2022-12-22 NOTE — REVIEW OF SYSTEMS
[Dyspnea on exertion] : dyspnea during exertion [Leg Claudication] : intermittent leg claudication [Joint Pain] : joint pain [Easy Bleeding] : a tendency for easy bleeding [Negative] : Genitourinary [Chest Discomfort] : no chest discomfort [Lower Ext Edema] : no extremity edema [Palpitations] : no palpitations [Orthopnea] : no orthopnea [Syncope] : no syncope

## 2022-12-22 NOTE — HISTORY OF PRESENT ILLNESS
[FreeTextEntry1] : 67M with HLD, HTN, Pulm HTN, CAD s/p PCI, ischemic CMP s/pBiV ICD implant in Feb 2020, COPD, Afib, active smoker here for PAD evaluation. \par \par 12/22/22 Pt is seen for f.u.  S/p COPD exacerbation, feels better now.  Pt decreased smoking to 1 cigarette per day.  Pt c/o BLE (L>R) pain more occurring when pt is laying down. No wounds.  s/p ICD interrogation NSVT was noted.  Pt denies chest pain, denies chest pain.  Pt doesn’t walk outside much.  Pt reported that he is only taking eliquis QD not BID.  Pt doesn’t want to see a podiatrist.  \par \par 11/10/22\par Pt smokes 1 pack per day and is not willing to quit. \par \par Pt c.o BLE pain when he walks.  Denies pain when is not active. Pt verbalized that he wishes not to have anymore medical procedures, tests, wishes not to see doctors any longer.  As per pt he is mostly home bound.  Declines further testing\par \par Cardiologist: Dr. Khalif Lamar

## 2022-12-22 NOTE — ASSESSMENT
[FreeTextEntry1] : Assessment:\par #PAD, [place] 3\par - LE arterial US suggestive of inflow disease, severe R distal SFA/pop artery disease\par #CAD\par #CHF EF 35%\par #DLD\par - 06/28/22 Chol 111 Trig 264 LDL 33 Hgb A1C 7.5 \par #Active smoker decreased smoking \par \par Plan:\par - Recommend further evaluation with CT angio aorta with lower extremity runoff, but patient declines further workup\par - Continue aggressive risk factor reduction with lifestyle modification and medical management\par - Teaching provided regarding benefits of walk therapy for PAD\par - Smoking cessation\par - Pt  to inspect his feet and legs for unhealed wounds, he should call if he develops a lower extremity wound \par - Encouraged him to establish care with a podiatrist for thickened toenails \par - He does not wish to see so many physicians\par - Follow-up with Dr. Lamar for cardiac care\par - Return to clinic in 6 months or sooner PRN\par \par I, Dr. Card, personally performed the evaluation and management (E/M) services for this established patient who presents today with (a) new problem(s)/exacerbation of (an) existing condition(s). That E/M includes conducting the clinically appropriate interval history &/or exam, assessing all new/exacerbated conditions, and establishing a new plan of care. Today, ELPIDIO Dubose was here to observe &/or participate in the visit & follow plan of care established by me. \par \par \par \par \par \par \par

## 2022-12-22 NOTE — PHYSICAL EXAM
[Well Developed] : well developed [Well Nourished] : well nourished [No Acute Distress] : no acute distress [No Carotid Bruit] : no carotid bruit [Normal S1, S2] : normal S1, S2 [No Murmur] : no murmur [No Rub] : no rub [No Gallop] : no gallop [Clear Lung Fields] : clear lung fields [Good Air Entry] : good air entry [No Respiratory Distress] : no respiratory distress  [Soft] : abdomen soft [Non Tender] : non-tender [No Masses/organomegaly] : no masses/organomegaly [Normal Bowel Sounds] : normal bowel sounds [Moves all extremities] : moves all extremities [No Focal Deficits] : no focal deficits [Normal Speech] : normal speech [No edema] : no edema [No rashes] : no rashes [de-identified] : RLE 3 rd toe discoloration

## 2023-01-18 ENCOUNTER — APPOINTMENT (OUTPATIENT)
Dept: CARDIOLOGY | Facility: CLINIC | Age: 68
End: 2023-01-18
Payer: MEDICARE

## 2023-01-18 ENCOUNTER — LABORATORY RESULT (OUTPATIENT)
Age: 68
End: 2023-01-18

## 2023-01-18 PROCEDURE — 99214 OFFICE O/P EST MOD 30 MIN: CPT | Mod: 25

## 2023-01-18 PROCEDURE — 93000 ELECTROCARDIOGRAM COMPLETE: CPT

## 2023-02-02 NOTE — PRE-OP CHECKLIST - BMI (KG/M2)
"Mandi Sandovalkay Valdes was seen and treated in our emergency department on 2/2/2023.  She may return to work on 02/06/2023.       If you have any questions or concerns, please don't hesitate to call.      Jermaine Tolliver PA-C" [phone]

## 2023-02-16 ENCOUNTER — NON-APPOINTMENT (OUTPATIENT)
Age: 68
End: 2023-02-16

## 2023-02-16 ENCOUNTER — APPOINTMENT (OUTPATIENT)
Dept: CARDIOLOGY | Facility: CLINIC | Age: 68
End: 2023-02-16
Payer: MEDICARE

## 2023-02-16 PROCEDURE — 93296 REM INTERROG EVL PM/IDS: CPT

## 2023-02-16 PROCEDURE — 93295 DEV INTERROG REMOTE 1/2/MLT: CPT

## 2023-05-18 ENCOUNTER — APPOINTMENT (OUTPATIENT)
Dept: CARDIOLOGY | Facility: CLINIC | Age: 68
End: 2023-05-18
Payer: MEDICARE

## 2023-05-18 ENCOUNTER — NON-APPOINTMENT (OUTPATIENT)
Age: 68
End: 2023-05-18

## 2023-05-18 PROCEDURE — 93296 REM INTERROG EVL PM/IDS: CPT

## 2023-05-18 PROCEDURE — 93295 DEV INTERROG REMOTE 1/2/MLT: CPT

## 2023-05-25 ENCOUNTER — APPOINTMENT (OUTPATIENT)
Dept: CARDIOLOGY | Facility: CLINIC | Age: 68
End: 2023-05-25
Payer: MEDICARE

## 2023-05-25 DIAGNOSIS — R06.09 OTHER FORMS OF DYSPNEA: ICD-10-CM

## 2023-05-25 DIAGNOSIS — I73.9 PERIPHERAL VASCULAR DISEASE, UNSPECIFIED: ICD-10-CM

## 2023-05-25 PROCEDURE — 93000 ELECTROCARDIOGRAM COMPLETE: CPT

## 2023-05-25 PROCEDURE — 93306 TTE W/DOPPLER COMPLETE: CPT

## 2023-05-25 PROCEDURE — 99214 OFFICE O/P EST MOD 30 MIN: CPT | Mod: 25

## 2023-05-30 PROBLEM — I73.9 CLAUDICATION: Status: ACTIVE | Noted: 2022-11-10

## 2023-05-30 PROBLEM — R06.09 DYSPNEA ON EXERTION: Status: ACTIVE | Noted: 2021-05-20

## 2023-08-16 ENCOUNTER — APPOINTMENT (OUTPATIENT)
Dept: ELECTROPHYSIOLOGY | Facility: CLINIC | Age: 68
End: 2023-08-16
Payer: MEDICARE

## 2023-08-16 VITALS
HEART RATE: 65 BPM | HEIGHT: 63 IN | BODY MASS INDEX: 36.14 KG/M2 | SYSTOLIC BLOOD PRESSURE: 138 MMHG | DIASTOLIC BLOOD PRESSURE: 82 MMHG | RESPIRATION RATE: 14 BRPM | TEMPERATURE: 97.1 F | WEIGHT: 204 LBS

## 2023-08-16 DIAGNOSIS — I47.29 OTHER VENTRICULAR TACHYCARDIA: ICD-10-CM

## 2023-08-16 PROCEDURE — 93284 PRGRMG EVAL IMPLANTABLE DFB: CPT

## 2023-08-16 PROCEDURE — 93000 ELECTROCARDIOGRAM COMPLETE: CPT | Mod: 59

## 2023-08-16 NOTE — ASSESSMENT
[FreeTextEntry1] : Mr. Frey is a 88 yo M with history of NIDCM s/p BiV ICD (2/5/20), paroxysmal AFib on Eliquis who presents for routine cardiac follow up visit.   # NIDCM - BiV ICD with interrogation as listed above. Normal functioning device. NSVT noted. Cath last year with PCI.  - Remote monitor is set up and patient is transmitting.   - Stable Optivol. Currently euvolemic - Cont GDMT . Kept on current vector  (LV2 to RV coil)  # Paroxysmal AFib- he is currently in Afib. with VR controll with occ. PVC - Advised pt on importance of taking Eliquis 5mg PO BID for CHADS VASc of at least 7 (CHF, HTN, Age DM, CAD).  No signs/symptoms of bleeding. Patient refuses to take it twice daily. - AF burden 4.4 % - Patient refusing pulm referral   -Patient is on metoprolol succ - orders says 200mg twice daily - but currently taking 2 (400) daily.  # Tobacco Use - Strongly advised tobacco /cigarette smoking cessation but he refused  # HTN - BP mildly elevated - 2g Na diet enforced   I have also advised the patient to go to the nearest emergency room if he experiences any chest pain, dyspnea, syncope, or has any other compelling symptoms.  Follow up in 6-9 months with NP.

## 2023-08-16 NOTE — CARDIOLOGY SUMMARY
[de-identified] : 08/16/2023 - 89 bpm a fib, BI paced , QRS 130ms 11/16/2022 - 78 bpm Bi V paced   2/23/2022 A-sensed, BiV paced (HR 82 bpm) [de-identified] : 9/15/21 EF 35%. Elevated LVEDP. Mild LAE. Bioprosthesis in aortic position. Normal structure and function of aortic prostehsis. \par  1/7/20, Mod LAE ,mild aortic root dilatation. LVEF 30-35%.  [de-identified] : Medtronic BI V ICD 2/5/2020 [de-identified] : 6/15/2021 MANN to 70% mid LAD. Mod-severe to pulm HTN. \par  10/2/19, 2v CAD (LAD and RCA). Prox LAD with 85% stenosis s/p PCI, 85% 1st Diag s/p PCI, 80% mid LAD stenosis s/p PCI

## 2023-08-16 NOTE — HISTORY OF PRESENT ILLNESS
[de-identified] : PCP Dr. Ceballos Cardiologist Dr. Lamar  69 yo M former  with history of syncope, HTN, paroxysmal AFib with RVR, CAD s/p PCI, ICM, aortic valve replacement,  s/p BiV ICD implant in Feb 2020. He is here for routine follow up visit. He denies chest pain, palpitations, dizziness, lightheadedness, presyncope or syncope. He complains of dyspnea on exertion radha when climbing stairs. He still smokes about a pack per day and is not interested in quitting. He takes his Eliquis once a day. He remains resistant about taking the eliquis every 12 hours despite explanation.

## 2023-08-16 NOTE — PROCEDURE
[No] : not [NSR] : normal sinus rhythm [See Device Printout] : See device printout [CRT-D] : Cardiac resynchronization therapy defibrillator [DDD] : DDD [Voltage: ___ volts] : Voltage was [unfilled] volts [Charge Time: ___ sec] : charge time was [unfilled] seconds [Threshold Testing Performed] : Threshold testing was performed [Atrial] : Atrial [Ventricular] : Ventricular [Sensing Amplitude ___mv] : sensing amplitude was [unfilled] mv [Lead Imp:  ___ohms] : lead impedance was [unfilled] ohms [___ ms] : [unfilled] ms [None] : none [Programmed for Longevity] : output reprogrammed for improved battery longevity [Asense-Vsense ___ %] : Asense-Vsense [unfilled]% [Asense-Vpace ___ %] : Asense-Vpace [unfilled]% [Apace-Vsense ___ %] : Apace-Vsense [unfilled]% [Apace-Vpace ___ %] : Apace-Vpace [unfilled]% [Longevity: ___ months] : The estimated remaining battery life is [unfilled] months [___V @] : [unfilled] V [de-identified] : 76 BPM [de-identified] : Medtronic [de-identified] : [id number] [de-identified] : 2/5/2020 [de-identified] : [id number] [de-identified] :  [de-identified] : see pace art for 8/16/2023 interrogation Effective 95.6% pacing Stable Optivol Transmitting on [employer]

## 2023-09-03 ENCOUNTER — INPATIENT (INPATIENT)
Facility: HOSPITAL | Age: 68
LOS: 5 days | Discharge: HOME | DRG: 69 | End: 2023-09-09
Attending: INTERNAL MEDICINE | Admitting: STUDENT IN AN ORGANIZED HEALTH CARE EDUCATION/TRAINING PROGRAM
Payer: MEDICARE

## 2023-09-03 VITALS
TEMPERATURE: 98 F | SYSTOLIC BLOOD PRESSURE: 133 MMHG | WEIGHT: 204.37 LBS | HEART RATE: 108 BPM | OXYGEN SATURATION: 95 % | RESPIRATION RATE: 24 BRPM | DIASTOLIC BLOOD PRESSURE: 75 MMHG

## 2023-09-03 DIAGNOSIS — Z95.5 PRESENCE OF CORONARY ANGIOPLASTY IMPLANT AND GRAFT: Chronic | ICD-10-CM

## 2023-09-03 DIAGNOSIS — Z98.890 OTHER SPECIFIED POSTPROCEDURAL STATES: Chronic | ICD-10-CM

## 2023-09-03 DIAGNOSIS — I63.9 CEREBRAL INFARCTION, UNSPECIFIED: ICD-10-CM

## 2023-09-03 DIAGNOSIS — Z95.2 PRESENCE OF PROSTHETIC HEART VALVE: Chronic | ICD-10-CM

## 2023-09-03 LAB
ALBUMIN SERPL ELPH-MCNC: 4.4 G/DL — SIGNIFICANT CHANGE UP (ref 3.5–5.2)
ALP SERPL-CCNC: 39 U/L — SIGNIFICANT CHANGE UP (ref 30–115)
ALT FLD-CCNC: 33 U/L — SIGNIFICANT CHANGE UP (ref 0–41)
ANION GAP SERPL CALC-SCNC: 19 MMOL/L — HIGH (ref 7–14)
APPEARANCE UR: CLEAR — SIGNIFICANT CHANGE UP
APTT BLD: 44.3 SEC — HIGH (ref 27–39.2)
AST SERPL-CCNC: 30 U/L — SIGNIFICANT CHANGE UP (ref 0–41)
BACTERIA # UR AUTO: ABNORMAL /HPF
BASE EXCESS BLDV CALC-SCNC: -4.1 MMOL/L — LOW (ref -2–3)
BASOPHILS # BLD AUTO: 0.06 K/UL — SIGNIFICANT CHANGE UP (ref 0–0.2)
BASOPHILS NFR BLD AUTO: 0.3 % — SIGNIFICANT CHANGE UP (ref 0–1)
BILIRUB SERPL-MCNC: 0.8 MG/DL — SIGNIFICANT CHANGE UP (ref 0.2–1.2)
BILIRUB UR-MCNC: NEGATIVE — SIGNIFICANT CHANGE UP
BUN SERPL-MCNC: 23 MG/DL — HIGH (ref 10–20)
CA-I SERPL-SCNC: 1.14 MMOL/L — LOW (ref 1.15–1.33)
CALCIUM SERPL-MCNC: 10.4 MG/DL — SIGNIFICANT CHANGE UP (ref 8.4–10.5)
CHLORIDE SERPL-SCNC: 102 MMOL/L — SIGNIFICANT CHANGE UP (ref 98–110)
CK SERPL-CCNC: 454 U/L — HIGH (ref 0–225)
CO2 SERPL-SCNC: 20 MMOL/L — SIGNIFICANT CHANGE UP (ref 17–32)
COLOR SPEC: SIGNIFICANT CHANGE UP
CREAT SERPL-MCNC: 1.6 MG/DL — HIGH (ref 0.7–1.5)
DIFF PNL FLD: NEGATIVE — SIGNIFICANT CHANGE UP
EGFR: 47 ML/MIN/1.73M2 — LOW
EOSINOPHIL # BLD AUTO: 0.01 K/UL — SIGNIFICANT CHANGE UP (ref 0–0.7)
EOSINOPHIL NFR BLD AUTO: 0 % — SIGNIFICANT CHANGE UP (ref 0–8)
EPI CELLS # UR: PRESENT
ETHANOL SERPL-MCNC: <10 MG/DL — SIGNIFICANT CHANGE UP
GAS PNL BLDV: 136 MMOL/L — SIGNIFICANT CHANGE UP (ref 136–145)
GAS PNL BLDV: SIGNIFICANT CHANGE UP
GIANT PLATELETS BLD QL SMEAR: PRESENT — SIGNIFICANT CHANGE UP
GLUCOSE BLDC GLUCOMTR-MCNC: 114 MG/DL — HIGH (ref 70–99)
GLUCOSE SERPL-MCNC: 68 MG/DL — LOW (ref 70–99)
GLUCOSE UR QL: NEGATIVE MG/DL — SIGNIFICANT CHANGE UP
HCO3 BLDV-SCNC: 20 MMOL/L — LOW (ref 22–29)
HCT VFR BLD CALC: 52.6 % — HIGH (ref 42–52)
HCT VFR BLD CALC: 56.7 % — HIGH (ref 42–52)
HCT VFR BLDA CALC: 59 % — CRITICAL HIGH (ref 39–51)
HGB BLD CALC-MCNC: 19.6 G/DL — CRITICAL HIGH (ref 12.6–17.4)
HGB BLD-MCNC: 17.5 G/DL — SIGNIFICANT CHANGE UP (ref 14–18)
HGB BLD-MCNC: 19.2 G/DL — CRITICAL HIGH (ref 14–18)
IMM GRANULOCYTES NFR BLD AUTO: 1 % — HIGH (ref 0.1–0.3)
INR BLD: 1.44 RATIO — HIGH (ref 0.65–1.3)
KETONES UR-MCNC: ABNORMAL MG/DL
LACTATE BLDV-MCNC: 6.3 MMOL/L — CRITICAL HIGH (ref 0.5–2)
LACTATE SERPL-SCNC: 2 MMOL/L — SIGNIFICANT CHANGE UP (ref 0.7–2)
LACTATE SERPL-SCNC: 2.9 MMOL/L — HIGH (ref 0.7–2)
LEUKOCYTE ESTERASE UR-ACNC: NEGATIVE — SIGNIFICANT CHANGE UP
LYMPHOCYTES # BLD AUTO: 1.03 K/UL — LOW (ref 1.2–3.4)
LYMPHOCYTES # BLD AUTO: 4.6 % — LOW (ref 20.5–51.1)
MCHC RBC-ENTMCNC: 29.6 PG — SIGNIFICANT CHANGE UP (ref 27–31)
MCHC RBC-ENTMCNC: 29.9 PG — SIGNIFICANT CHANGE UP (ref 27–31)
MCHC RBC-ENTMCNC: 33.3 G/DL — SIGNIFICANT CHANGE UP (ref 32–37)
MCHC RBC-ENTMCNC: 33.9 G/DL — SIGNIFICANT CHANGE UP (ref 32–37)
MCV RBC AUTO: 88.3 FL — SIGNIFICANT CHANGE UP (ref 80–94)
MCV RBC AUTO: 88.9 FL — SIGNIFICANT CHANGE UP (ref 80–94)
MONOCYTES # BLD AUTO: 0.94 K/UL — HIGH (ref 0.1–0.6)
MONOCYTES NFR BLD AUTO: 4.2 % — SIGNIFICANT CHANGE UP (ref 1.7–9.3)
NEUTROPHILS # BLD AUTO: 20.05 K/UL — HIGH (ref 1.4–6.5)
NEUTROPHILS NFR BLD AUTO: 89.9 % — HIGH (ref 42.2–75.2)
NEUTS BAND # BLD: 15 % — HIGH (ref 0–6)
NITRITE UR-MCNC: NEGATIVE — SIGNIFICANT CHANGE UP
NRBC # BLD: 0 /100 WBCS — SIGNIFICANT CHANGE UP (ref 0–0)
NRBC # BLD: 0 /100 WBCS — SIGNIFICANT CHANGE UP (ref 0–0)
NRBC # BLD: 0 /100 — SIGNIFICANT CHANGE UP (ref 0–0)
PCO2 BLDV: 35 MMHG — LOW (ref 42–55)
PH BLDV: 7.37 — SIGNIFICANT CHANGE UP (ref 7.32–7.43)
PH UR: 6.5 — SIGNIFICANT CHANGE UP (ref 5–8)
PLAT MORPH BLD: NORMAL — SIGNIFICANT CHANGE UP
PLATELET # BLD AUTO: 255 K/UL — SIGNIFICANT CHANGE UP (ref 130–400)
PLATELET # BLD AUTO: 276 K/UL — SIGNIFICANT CHANGE UP (ref 130–400)
PLATELET CLUMP BLD QL SMEAR: ABNORMAL
PLATELET COUNT - ESTIMATE: NORMAL — SIGNIFICANT CHANGE UP
PMV BLD: 11.3 FL — HIGH (ref 7.4–10.4)
PMV BLD: 11.8 FL — HIGH (ref 7.4–10.4)
PO2 BLDV: 41 MMHG — SIGNIFICANT CHANGE UP
POTASSIUM BLDV-SCNC: 5 MMOL/L — SIGNIFICANT CHANGE UP (ref 3.5–5.1)
POTASSIUM SERPL-MCNC: 4.8 MMOL/L — SIGNIFICANT CHANGE UP (ref 3.5–5)
POTASSIUM SERPL-SCNC: 4.8 MMOL/L — SIGNIFICANT CHANGE UP (ref 3.5–5)
PROT SERPL-MCNC: 7.1 G/DL — SIGNIFICANT CHANGE UP (ref 6–8)
PROT UR-MCNC: 300 MG/DL
PROTHROM AB SERPL-ACNC: 16.6 SEC — HIGH (ref 9.95–12.87)
RBC # BLD: 5.92 M/UL — SIGNIFICANT CHANGE UP (ref 4.7–6.1)
RBC # BLD: 6.42 M/UL — HIGH (ref 4.7–6.1)
RBC # FLD: 13.5 % — SIGNIFICANT CHANGE UP (ref 11.5–14.5)
RBC # FLD: 13.5 % — SIGNIFICANT CHANGE UP (ref 11.5–14.5)
RBC BLD AUTO: ABNORMAL
RBC CASTS # UR COMP ASSIST: 1 /HPF — SIGNIFICANT CHANGE UP (ref 0–4)
SAO2 % BLDV: 62.5 % — SIGNIFICANT CHANGE UP
SODIUM SERPL-SCNC: 141 MMOL/L — SIGNIFICANT CHANGE UP (ref 135–146)
SP GR SPEC: 1.02 — SIGNIFICANT CHANGE UP (ref 1–1.03)
SQUAMOUS # UR AUTO: 3 /HPF — SIGNIFICANT CHANGE UP (ref 0–5)
TROPONIN T SERPL-MCNC: 0.05 NG/ML — CRITICAL HIGH
TROPONIN T SERPL-MCNC: 0.07 NG/ML — CRITICAL HIGH
UROBILINOGEN FLD QL: 1 MG/DL — SIGNIFICANT CHANGE UP (ref 0.2–1)
WBC # BLD: 19.86 K/UL — HIGH (ref 4.8–10.8)
WBC # BLD: 22.31 K/UL — HIGH (ref 4.8–10.8)
WBC # FLD AUTO: 19.86 K/UL — HIGH (ref 4.8–10.8)
WBC # FLD AUTO: 22.31 K/UL — HIGH (ref 4.8–10.8)
WBC UR QL: 2 /HPF — SIGNIFICANT CHANGE UP (ref 0–5)

## 2023-09-03 PROCEDURE — 92507 TX SP LANG VOICE COMM INDIV: CPT | Mod: GN

## 2023-09-03 PROCEDURE — 83605 ASSAY OF LACTIC ACID: CPT

## 2023-09-03 PROCEDURE — 97110 THERAPEUTIC EXERCISES: CPT | Mod: GO

## 2023-09-03 PROCEDURE — 83880 ASSAY OF NATRIURETIC PEPTIDE: CPT

## 2023-09-03 PROCEDURE — 86900 BLOOD TYPING SEROLOGIC ABO: CPT

## 2023-09-03 PROCEDURE — 86901 BLOOD TYPING SEROLOGIC RH(D): CPT

## 2023-09-03 PROCEDURE — 85730 THROMBOPLASTIN TIME PARTIAL: CPT

## 2023-09-03 PROCEDURE — 71045 X-RAY EXAM CHEST 1 VIEW: CPT | Mod: 26

## 2023-09-03 PROCEDURE — 80048 BASIC METABOLIC PNL TOTAL CA: CPT

## 2023-09-03 PROCEDURE — 93005 ELECTROCARDIOGRAM TRACING: CPT

## 2023-09-03 PROCEDURE — 85025 COMPLETE CBC W/AUTO DIFF WBC: CPT

## 2023-09-03 PROCEDURE — 85610 PROTHROMBIN TIME: CPT

## 2023-09-03 PROCEDURE — 83735 ASSAY OF MAGNESIUM: CPT

## 2023-09-03 PROCEDURE — 92610 EVALUATE SWALLOWING FUNCTION: CPT | Mod: GN

## 2023-09-03 PROCEDURE — 70450 CT HEAD/BRAIN W/O DYE: CPT | Mod: 26,76,MA,XU

## 2023-09-03 PROCEDURE — 93306 TTE W/DOPPLER COMPLETE: CPT

## 2023-09-03 PROCEDURE — 70496 CT ANGIOGRAPHY HEAD: CPT | Mod: 26,MA

## 2023-09-03 PROCEDURE — 97162 PT EVAL MOD COMPLEX 30 MIN: CPT | Mod: GP

## 2023-09-03 PROCEDURE — 82962 GLUCOSE BLOOD TEST: CPT

## 2023-09-03 PROCEDURE — 70551 MRI BRAIN STEM W/O DYE: CPT

## 2023-09-03 PROCEDURE — 36415 COLL VENOUS BLD VENIPUNCTURE: CPT

## 2023-09-03 PROCEDURE — 80053 COMPREHEN METABOLIC PANEL: CPT

## 2023-09-03 PROCEDURE — 70450 CT HEAD/BRAIN W/O DYE: CPT

## 2023-09-03 PROCEDURE — 97535 SELF CARE MNGMENT TRAINING: CPT | Mod: GO

## 2023-09-03 PROCEDURE — 83036 HEMOGLOBIN GLYCOSYLATED A1C: CPT

## 2023-09-03 PROCEDURE — 86850 RBC ANTIBODY SCREEN: CPT

## 2023-09-03 PROCEDURE — 87641 MR-STAPH DNA AMP PROBE: CPT

## 2023-09-03 PROCEDURE — 97116 GAIT TRAINING THERAPY: CPT | Mod: GP

## 2023-09-03 PROCEDURE — 97165 OT EVAL LOW COMPLEX 30 MIN: CPT | Mod: GO

## 2023-09-03 PROCEDURE — 80076 HEPATIC FUNCTION PANEL: CPT

## 2023-09-03 PROCEDURE — 80307 DRUG TEST PRSMV CHEM ANLYZR: CPT

## 2023-09-03 PROCEDURE — 0042T: CPT | Mod: MA

## 2023-09-03 PROCEDURE — 94760 N-INVAS EAR/PLS OXIMETRY 1: CPT

## 2023-09-03 PROCEDURE — 71045 X-RAY EXAM CHEST 1 VIEW: CPT

## 2023-09-03 PROCEDURE — 85027 COMPLETE CBC AUTOMATED: CPT

## 2023-09-03 PROCEDURE — 84100 ASSAY OF PHOSPHORUS: CPT

## 2023-09-03 PROCEDURE — 84145 PROCALCITONIN (PCT): CPT

## 2023-09-03 PROCEDURE — 92523 SPEECH SOUND LANG COMPREHEN: CPT | Mod: GN

## 2023-09-03 PROCEDURE — 87640 STAPH A DNA AMP PROBE: CPT

## 2023-09-03 PROCEDURE — 93010 ELECTROCARDIOGRAM REPORT: CPT | Mod: 76

## 2023-09-03 PROCEDURE — 70498 CT ANGIOGRAPHY NECK: CPT | Mod: 26,MA

## 2023-09-03 PROCEDURE — 92526 ORAL FUNCTION THERAPY: CPT | Mod: GN

## 2023-09-03 PROCEDURE — 84484 ASSAY OF TROPONIN QUANT: CPT

## 2023-09-03 PROCEDURE — 80061 LIPID PANEL: CPT

## 2023-09-03 PROCEDURE — 95819 EEG AWAKE AND ASLEEP: CPT

## 2023-09-03 PROCEDURE — 99291 CRITICAL CARE FIRST HOUR: CPT

## 2023-09-03 RX ORDER — HEPARIN SODIUM 5000 [USP'U]/ML
2000 INJECTION INTRAVENOUS; SUBCUTANEOUS ONCE
Refills: 0 | Status: COMPLETED | OUTPATIENT
Start: 2023-09-03 | End: 2023-09-03

## 2023-09-03 RX ORDER — CLOPIDOGREL BISULFATE 75 MG/1
300 TABLET, FILM COATED ORAL ONCE
Refills: 0 | Status: COMPLETED | OUTPATIENT
Start: 2023-09-03 | End: 2023-09-03

## 2023-09-03 RX ORDER — NITROGLYCERIN 6.5 MG
1 CAPSULE, EXTENDED RELEASE ORAL
Qty: 0 | Refills: 0 | DISCHARGE

## 2023-09-03 RX ORDER — GLYCOPYRROLATE AND FORMOTEROL FUMARATE 9; 4.8 UG/1; UG/1
2 AEROSOL, METERED RESPIRATORY (INHALATION)
Qty: 0 | Refills: 0 | DISCHARGE

## 2023-09-03 RX ORDER — AMIODARONE HYDROCHLORIDE 400 MG/1
1 TABLET ORAL
Qty: 450 | Refills: 0 | Status: DISCONTINUED | OUTPATIENT
Start: 2023-09-03 | End: 2023-09-05

## 2023-09-03 RX ORDER — SODIUM CHLORIDE 9 MG/ML
1000 INJECTION INTRAMUSCULAR; INTRAVENOUS; SUBCUTANEOUS ONCE
Refills: 0 | Status: COMPLETED | OUTPATIENT
Start: 2023-09-03 | End: 2023-09-03

## 2023-09-03 RX ORDER — AMIODARONE HYDROCHLORIDE 400 MG/1
1 TABLET ORAL
Qty: 450 | Refills: 0 | Status: DISCONTINUED | OUTPATIENT
Start: 2023-09-03 | End: 2023-09-03

## 2023-09-03 RX ORDER — DULAGLUTIDE 4.5 MG/.5ML
0 INJECTION, SOLUTION SUBCUTANEOUS
Qty: 0 | Refills: 0 | DISCHARGE

## 2023-09-03 RX ORDER — HEPARIN SODIUM 5000 [USP'U]/ML
1000 INJECTION INTRAVENOUS; SUBCUTANEOUS
Qty: 25000 | Refills: 0 | Status: DISCONTINUED | OUTPATIENT
Start: 2023-09-03 | End: 2023-09-04

## 2023-09-03 RX ORDER — ZOLPIDEM TARTRATE 10 MG/1
5 TABLET ORAL AT BEDTIME
Refills: 0 | Status: DISCONTINUED | OUTPATIENT
Start: 2023-09-03 | End: 2023-09-09

## 2023-09-03 RX ORDER — METOPROLOL TARTRATE 50 MG
5 TABLET ORAL EVERY 6 HOURS
Refills: 0 | Status: DISCONTINUED | OUTPATIENT
Start: 2023-09-03 | End: 2023-09-03

## 2023-09-03 RX ORDER — ATORVASTATIN CALCIUM 80 MG/1
80 TABLET, FILM COATED ORAL AT BEDTIME
Refills: 0 | Status: DISCONTINUED | OUTPATIENT
Start: 2023-09-03 | End: 2023-09-09

## 2023-09-03 RX ORDER — AMIODARONE HYDROCHLORIDE 400 MG/1
150 TABLET ORAL ONCE
Refills: 0 | Status: COMPLETED | OUTPATIENT
Start: 2023-09-03 | End: 2023-09-03

## 2023-09-03 RX ORDER — ESZOPICLONE 2 MG/1
1 TABLET, COATED ORAL
Qty: 30 | Refills: 0 | DISCHARGE

## 2023-09-03 RX ORDER — METOPROLOL TARTRATE 50 MG
200 TABLET ORAL DAILY
Refills: 0 | Status: DISCONTINUED | OUTPATIENT
Start: 2023-09-03 | End: 2023-09-03

## 2023-09-03 RX ORDER — FENOFIBRATE,MICRONIZED 130 MG
1 CAPSULE ORAL
Qty: 90 | Refills: 0 | DISCHARGE

## 2023-09-03 RX ORDER — VANCOMYCIN HCL 1 G
1500 VIAL (EA) INTRAVENOUS EVERY 24 HOURS
Refills: 0 | Status: DISCONTINUED | OUTPATIENT
Start: 2023-09-03 | End: 2023-09-05

## 2023-09-03 RX ORDER — FENOFIBRATE,MICRONIZED 130 MG
145 CAPSULE ORAL DAILY
Refills: 0 | Status: DISCONTINUED | OUTPATIENT
Start: 2023-09-03 | End: 2023-09-09

## 2023-09-03 RX ORDER — ACETAMINOPHEN 500 MG
650 TABLET ORAL ONCE
Refills: 0 | Status: COMPLETED | OUTPATIENT
Start: 2023-09-03 | End: 2023-09-03

## 2023-09-03 RX ORDER — METOPROLOL TARTRATE 50 MG
5 TABLET ORAL EVERY 6 HOURS
Refills: 0 | Status: DISCONTINUED | OUTPATIENT
Start: 2023-09-03 | End: 2023-09-04

## 2023-09-03 RX ORDER — DILTIAZEM HCL 120 MG
20 CAPSULE, EXT RELEASE 24 HR ORAL ONCE
Refills: 0 | Status: COMPLETED | OUTPATIENT
Start: 2023-09-03 | End: 2023-09-03

## 2023-09-03 RX ORDER — CEFTRIAXONE 500 MG/1
2000 INJECTION, POWDER, FOR SOLUTION INTRAMUSCULAR; INTRAVENOUS ONCE
Refills: 0 | Status: COMPLETED | OUTPATIENT
Start: 2023-09-03 | End: 2023-09-03

## 2023-09-03 RX ORDER — FUROSEMIDE 40 MG
40 TABLET ORAL DAILY
Refills: 0 | Status: DISCONTINUED | OUTPATIENT
Start: 2023-09-03 | End: 2023-09-09

## 2023-09-03 RX ORDER — CLOPIDOGREL BISULFATE 75 MG/1
75 TABLET, FILM COATED ORAL ONCE
Refills: 0 | Status: COMPLETED | OUTPATIENT
Start: 2023-09-03 | End: 2023-09-03

## 2023-09-03 RX ORDER — METFORMIN HYDROCHLORIDE 850 MG/1
1 TABLET ORAL
Qty: 0 | Refills: 0 | DISCHARGE

## 2023-09-03 RX ORDER — METOPROLOL TARTRATE 50 MG
50 TABLET ORAL
Refills: 0 | Status: DISCONTINUED | OUTPATIENT
Start: 2023-09-03 | End: 2023-09-03

## 2023-09-03 RX ORDER — CEFEPIME 1 G/1
2000 INJECTION, POWDER, FOR SOLUTION INTRAMUSCULAR; INTRAVENOUS EVERY 12 HOURS
Refills: 0 | Status: DISCONTINUED | OUTPATIENT
Start: 2023-09-03 | End: 2023-09-05

## 2023-09-03 RX ORDER — AMIODARONE HYDROCHLORIDE 400 MG/1
0.5 TABLET ORAL
Qty: 450 | Refills: 0 | Status: DISCONTINUED | OUTPATIENT
Start: 2023-09-03 | End: 2023-09-05

## 2023-09-03 RX ORDER — ASPIRIN/CALCIUM CARB/MAGNESIUM 324 MG
325 TABLET ORAL ONCE
Refills: 0 | Status: COMPLETED | OUTPATIENT
Start: 2023-09-03 | End: 2023-09-03

## 2023-09-03 RX ORDER — VANCOMYCIN HCL 1 G
1000 VIAL (EA) INTRAVENOUS ONCE
Refills: 0 | Status: COMPLETED | OUTPATIENT
Start: 2023-09-03 | End: 2023-09-03

## 2023-09-03 RX ORDER — DULAGLUTIDE 4.5 MG/.5ML
4.5 INJECTION, SOLUTION SUBCUTANEOUS
Refills: 0 | DISCHARGE

## 2023-09-03 RX ORDER — DEXTROSE 50 % IN WATER 50 %
25 SYRINGE (ML) INTRAVENOUS ONCE
Refills: 0 | Status: COMPLETED | OUTPATIENT
Start: 2023-09-03 | End: 2023-09-03

## 2023-09-03 RX ORDER — CLOPIDOGREL BISULFATE 75 MG/1
75 TABLET, FILM COATED ORAL DAILY
Refills: 0 | Status: DISCONTINUED | OUTPATIENT
Start: 2023-09-04 | End: 2023-09-09

## 2023-09-03 RX ORDER — SACUBITRIL AND VALSARTAN 24; 26 MG/1; MG/1
1 TABLET, FILM COATED ORAL
Refills: 0 | Status: DISCONTINUED | OUTPATIENT
Start: 2023-09-03 | End: 2023-09-09

## 2023-09-03 RX ADMIN — Medication 300 MILLIGRAM(S): at 23:14

## 2023-09-03 RX ADMIN — HEPARIN SODIUM 2000 UNIT(S): 5000 INJECTION INTRAVENOUS; SUBCUTANEOUS at 21:12

## 2023-09-03 RX ADMIN — Medication 1000 MILLIGRAM(S): at 14:45

## 2023-09-03 RX ADMIN — CEFTRIAXONE 2000 MILLIGRAM(S): 500 INJECTION, POWDER, FOR SOLUTION INTRAMUSCULAR; INTRAVENOUS at 15:31

## 2023-09-03 RX ADMIN — AMIODARONE HYDROCHLORIDE 16.7 MG/MIN: 400 TABLET ORAL at 23:48

## 2023-09-03 RX ADMIN — Medication 250 MILLIGRAM(S): at 13:45

## 2023-09-03 RX ADMIN — AMIODARONE HYDROCHLORIDE 33.3 MG/MIN: 400 TABLET ORAL at 14:33

## 2023-09-03 RX ADMIN — SODIUM CHLORIDE 1000 MILLILITER(S): 9 INJECTION INTRAMUSCULAR; INTRAVENOUS; SUBCUTANEOUS at 15:32

## 2023-09-03 RX ADMIN — AMIODARONE HYDROCHLORIDE 600 MILLIGRAM(S): 400 TABLET ORAL at 14:20

## 2023-09-03 RX ADMIN — AMIODARONE HYDROCHLORIDE 150 MILLIGRAM(S): 400 TABLET ORAL at 14:30

## 2023-09-03 RX ADMIN — Medication 25 GRAM(S): at 14:32

## 2023-09-03 RX ADMIN — HEPARIN SODIUM 10 UNIT(S)/HR: 5000 INJECTION INTRAVENOUS; SUBCUTANEOUS at 22:03

## 2023-09-03 RX ADMIN — Medication 20 MILLIGRAM(S): at 23:41

## 2023-09-03 RX ADMIN — HEPARIN SODIUM 10 UNIT(S)/HR: 5000 INJECTION INTRAVENOUS; SUBCUTANEOUS at 21:11

## 2023-09-03 RX ADMIN — Medication 650 MILLIGRAM(S): at 17:24

## 2023-09-03 RX ADMIN — SODIUM CHLORIDE 1000 MILLILITER(S): 9 INJECTION INTRAMUSCULAR; INTRAVENOUS; SUBCUTANEOUS at 13:37

## 2023-09-03 RX ADMIN — Medication 5 MILLIGRAM(S): at 20:55

## 2023-09-03 RX ADMIN — CEFTRIAXONE 100 MILLIGRAM(S): 500 INJECTION, POWDER, FOR SOLUTION INTRAMUSCULAR; INTRAVENOUS at 14:50

## 2023-09-03 RX ADMIN — SODIUM CHLORIDE 1000 MILLILITER(S): 9 INJECTION INTRAMUSCULAR; INTRAVENOUS; SUBCUTANEOUS at 14:31

## 2023-09-03 NOTE — H&P ADULT - NS ATTEND AMEND GEN_ALL_CORE FT
69 yo male with hx HTN, IDDM, HLD, active smoker (+1PPD), CAD s/p PCI, A-Fib on Eliquis, HFrEF (30%) s/p AICD p/w expressive aphasia, right sided weakness and difficulty getting up from the ground since yesterday morning.  Last known normal was approximately 9/1 evening, pt is AAOx3, conversational and ambulates independently at baseline as per spouse.    #Suspected left MCA ischemic stroke  -CT head: no acute infarct  -CT Perfusion: Large (96 mL) volume of decreased cerebral blood flow within the left MCA   territory, concerning for ischemia  -Repeat CT head tonight at 9pm  -Continue Plavix, Eliquis, Statin  -q1h neuro checks until repeat CT head, if negative can do q4h neuro checks  -maintain systolic -180  -PT/OT/ST  -NGT if fail s/s  -A1c, lipid profile  -Above plan discussed with Dr. Rolle    #A-Fib with RVR  #Episodes of SVTs  -started on Amio gtt:  -> 110s  -cont Eliquis  - holding BB for stroke permissive HTN goal; can resume if BP or HR rises  -cardio on board; f/u recs    #Leukocytosis w/ elevated lactate  #Erythrocytosis  -UA negative  -f/u Chest x-ray high risk for aspiration pna, procal  -repeat CBC post fluids - likely hemoconcentrated. if not improving consider heme eval  -empiric antibiotics for now Vanc  Cefepime  -trend lactate; goal directed fluid resuscitation   -f/u ID    #CAD s/p PCI - stable, EKG no ischemic changes, elevated troponin likely demand ischemia trend, cont Plavix, statin  #HFrEF (30%) s/p AICD - stable, resume lasix PO 40 mg tomorrow, cont Entresto, hold BB for permissive BP goal  #HTN - permissive HTN as above; hold metoprolol for now on Amio gtt  #IDDM - on high dose insulin regimen at home, however pt npo until s/s and fg low. would monitor FG closely and consider insulin gtt if needed  #HLD - statin as above  #Active smoker (+1PPD) - encourage cessation; offer nicotine replacement    #Misc  DVT ppx- Eliquis  Diet- NPO until s/s  Activity-IAT  Code- Full

## 2023-09-03 NOTE — ED ADULT NURSE NOTE - OBJECTIVE STATEMENT
as per pts son, pt last known well 1 pm yesterday   pt altered, unresponsive upon arrival to er   stroke code called at 1224 pm   pt taken to stat ct scan    son denies sick contacts, as per son pt baseline a&ox3

## 2023-09-03 NOTE — CONSULT NOTE ADULT - SUBJECTIVE AND OBJECTIVE BOX
Anabel HPI:  67 yo male with hx HTN, IDDM, HLD, active smoker (+1PPD), CAD s/p PCI, A-Fib on Eliquis, HFrEF (30%) s/p AICD p/w expressive aphasia, right sided weakness and difficulty getting up from the ground since yesterday morning.  Last known normal was approximately 9/1 evening, pt is AAOx3, conversational and ambulates independently at baseline as per spouse. According to spouse, pt compliant with his medications. She denies any complaints of fevers, chest pain, sob, recent illness. History and ROS limited at this time 2/2 pt with speech difficulty, and spouse is poor historian.     In the ED,   T(F): 99.4   HR: 145 A-Fib w/ RVR w/ runs of SVT started on Amio gtt. Cardio called, agree with plan, will follow. AICD was interrogated per ED, consistent with A-Fib w/ RVR and SVT.    BP: 179/76  RR: 30  SpO2: 96% on room air  Labs showed WBC 22k w/ NP, Hgb 19, Cr 1.6 baseline 1.4, Lactate 6.3 pH wnl, troponin 0.05. UA negative.   Stroke code on arrival. Imaging revealed large (96 mL) volume of decreased cerebral blood flow within the left MCA   territory, concerning for ischemia. No core infarct detected. No acute intervention.  Being admitted to MICU for q1h neuro checks.    (03 Sep 2023 16:17)        HPI-Cardiology   Pt with the above hx, evaluated at bedside. Pt presented to the ED with expressive aphasia, right sided weakness and difficulty getting up from the ground x2 days. Per Pts' spouse at bedside she noticed the Pt unable to speak or get up/move since Friday 9/1, but worsening the last 2 days. Per spouse Pt is AAOx3, conversational and ambulates independently at baseline. Pt's spouse states that she is unsure if Pt' took his meds the last 2 days. Radiology tests and hospital records, were reviewed, as well as previous notes on this patient.      PAST MEDICAL & SURGICAL HISTORY  CHF (congestive heart failure)    Diabetes    CAD (coronary artery disease)    Chronic obstructive pulmonary disease (COPD)    HTN (hypertension)    Stented coronary artery    Dyslipidemia    GERD (gastroesophageal reflux disease)    Afib    H/O heart artery stent    Heart valve replaced  aortic    History of Nissen fundoplication          ALLERGIES:  sulfa drugs (Unknown)      MEDICATIONS:  MEDICATIONS  (STANDING):  aMIOdarone Infusion 1 mG/Min (33.3 mL/Hr) IV Continuous <Continuous>  atorvastatin 80 milliGRAM(s) Oral at bedtime  cefepime   IVPB 2000 milliGRAM(s) IV Intermittent every 12 hours  fenofibrate Tablet 145 milliGRAM(s) Oral daily  furosemide    Tablet 40 milliGRAM(s) Oral daily  sacubitril 24 mG/valsartan 26 mG 1 Tablet(s) Oral two times a day  vancomycin  IVPB 1500 milliGRAM(s) IV Intermittent every 24 hours    MEDICATIONS  (PRN):  metoprolol tartrate Injectable 5 milliGRAM(s) IV Push every 6 hours PRN give additional metoprolol push if BP >180  zolpidem 5 milliGRAM(s) Oral at bedtime PRN Insomnia      HOME MEDICATIONS:  Home Medications:  atorvastatin 40 mg oral tablet: 1 tab(s) orally once a day (at bedtime) (05 Feb 2020 17:41)  clopidogrel 75 mg oral tablet: 1 tab(s) orally once a day (05 Feb 2020 17:41)  Entresto 24 mg-26 mg oral tablet: 1 tab(s) orally 2 times a day (05 Feb 2020 17:41)  ezetimibe 10 mg oral tablet: 1 orally once a day (03 Sep 2023 16:41)  fenofibrate 145 mg oral tablet: 1 orally once a day (03 Sep 2023 16:41)  furosemide 40 mg oral tablet: 1 orally once a day (03 Sep 2023 16:41)  Lunesta 1 mg oral tablet: 1 orally once a day (at bedtime) (03 Sep 2023 16:41)  metoprolol succinate 200 mg oral capsule, extended release: 1 cap(s) orally once a day (05 Feb 2020 17:41)  NovoLOG 100 units/mL injectable solution: 8 unit(s) injectable once a day (05 Feb 2020 17:41)  OMEGA-3 ETHYL ESTERS 1 GM CAP: 1 cap(s) orally once a day (05 Feb 2020 17:41)  Tresiba 100 units/mL subcutaneous solution: 150 unit(s) subcutaneous once a day (05 Feb 2020 17:41)      VITALS:   T(F): 100.6 (09-03 @ 17:16), Max: 100.6 (09-03 @ 17:16)  HR: 104 (09-03 @ 17:24) (104 - 145)  BP: 108/84 (09-03 @ 17:24) (98/52 - 179/76)  BP(mean): --  RR: 30 (09-03 @ 17:24) (24 - 36)  SpO2: 98% (09-03 @ 17:24) (95% - 98%)    I&O's Summary    03 Sep 2023 07:01  -  03 Sep 2023 18:48  --------------------------------------------------------  IN: 33.3 mL / OUT: 0 mL / NET: 33.3 mL        REVIEW OF SYSTEMS:  Unable to obtain as Pt aphasic         PHYSICAL EXAM:  NEURO: confused, aphasic  GEN: Not in acute distress  NECK: no thyroid enlargement, no JVD  LUNGS: decreased at bases b/l   CARDIOVASCULAR: S1/S2 present, regular rhythm irregular rate , no murmurs or rubs, no carotid bruits,  + PP bilaterally  ABD: Soft, non-tender, non-distended, +BS  EXT: No JUAN  SKIN: Intact        LABS:                        19.2   22.31 )-----------( 276      ( 03 Sep 2023 12:50 )             56.7     09-03    141  |  102  |  23<H>  ----------------------------<  68<L>  4.8   |  20  |  1.6<H>    Ca    10.4      03 Sep 2023 12:50    TPro  7.1  /  Alb  4.4  /  TBili  0.8  /  DBili  x   /  AST  30  /  ALT  33  /  AlkPhos  39  09-03    PT/INR - ( 03 Sep 2023 12:50 )   PT: 16.60 sec;   INR: 1.44 ratio         PTT - ( 03 Sep 2023 12:50 )  PTT:44.3 sec  Lactate, Blood: 2.9 mmol/L *H* (09-03-23 @ 15:15)  Creatine Kinase, Serum: 454 U/L *H* (09-03-23 @ 13:18)  Troponin T, Serum: 0.05 ng/mL *HH* (09-03-23 @ 12:50)    CARDIAC MARKERS ( 03 Sep 2023 13:18 )  x     / x     / 454 U/L / x     / x      CARDIAC MARKERS ( 03 Sep 2023 12:50 )  x     / 0.05 ng/mL / x     / x     / x            RADIOLOGY:  < from: CT Angio Brain Stroke Protocol  w/ IV Cont (09.03.23 @ 12:58) >    IMPRESSION:  CT PERFUSION:  Large (96 mL) volume of decreased cerebral blood flow within the left MCA   territory, concerning for ischemia. No core infarct detected.    CTA HEAD/NECK:  Atherosclerotic plaques are seen in the bilateral carotid bulbs and   carotid siphons contributing to up to moderate stenosis in the right   carotid bulb.    --- End of Report ---    < end of copied text >

## 2023-09-03 NOTE — CONSULT NOTE ADULT - ASSESSMENT
69 yo male with hx HTN, IDDM, HLD, active smoker (+1PPD), CAD s/p PCI, A-Fib on Eliquis, HFrEF (30%) s/p AICD/BVPPM, p/w expressive aphasia, right sided weakness and difficulty getting up from the ground/moving x2 days      Impression:  #Chronic AFib: in RVR / SVT?  #Hx of CAD s/p PCI w/stents  #ICM (HFrEF30%) s/p ICD/PPM  #HLD/DM/HTN    ECG: AFib w/RVR, paced rhythm  AICD/PPM interrogated and showed atrial tachy/Afib w/rvr, possible SVT?  Tele shows paced rhythm with HR sustained 130's  On exam appears euvolemic  Trop 0.05  Cr 1.6  Lactate 2.9  CTA Head/Neck: Possible acute ischemic stroke?        Plan:  Cont Amio gtt  When able resume home dose of Metoprolol 100mg PO BID  Hold Eliquis and change to Therapeutic Lovenox or Heparin gtt  Repeat ECG  Obtain TTE  Hold Entresto and resume when renal function improves  C/w Lipitor 40, Plavix 75, Lasix 40 daily  Monitor lytes

## 2023-09-03 NOTE — ED ADULT TRIAGE NOTE - CHIEF COMPLAINT QUOTE
As per EMS, patient started becoming altered saturday morning. Patient unable to speak is breathing irregularly, not speaking. Patient normally A0x3. Patient's son states this started yestderay at 6pm.

## 2023-09-03 NOTE — H&P ADULT - HISTORY OF PRESENT ILLNESS
67 yo male with hx HTN, IDDM, HLD, active smoker (+1PPD), CAD s/p PCI, A-Fib on Eliquis, HFrEF (30%) s/p AICD p/w expressive aphasia, right sided weakness and difficulty getting up from the ground since yesterday morning.  Last known normal was approximately 9/1 evening, pt is AAOx3, conversational and ambulates independently at baseline as per spouse. According to spouse, pt compliant with his medications. She denies any complaints of fevers, chest pain, sob, recent illness. History and ROS limited at this time 2/2 pt with speech difficulty, and spouse is poor historian.     In the ED,   T(F): 99.4   HR: 145 A-Fib w/ RVR w/ runs of SVT started on Amio gtt. Cardio called, agree with plan, will follow. AICD was interrogated per ED, consistent with A-Fib w/ RVR and SVT.    BP: 179/76  RR: 30  SpO2: 96% on room air  Labs showed WBC 22k w/ NP, Hgb 19, Cr 1.6 baseline 1.4, Lactate 6.3 pH wnl, troponin 0.05. UA negative.   Stroke code on arrival. Imaging revealed large (96 mL) volume of decreased cerebral blood flow within the left MCA   territory, concerning for ischemia. No core infarct detected. No acute intervention.  Being admitted to MICU for q1h neuro checks.

## 2023-09-03 NOTE — ED ADULT NURSE NOTE - NSFALLCONCLUSION_ED_ALL_ED
November 8, 2018      Morales Cordoba, OD  [address]  [place] [address]           [place] [facility]  [address] [facility]  [place] [address]  Phone: [phone]  Fax: [phone]          Patient: Maya Ervin   MR Number: [id number]   YOB: 1957   Date of Visit: 11/8/2018       Dear Dr. Morales Cordoba:    Thank you for referring Maya Ervin to me for evaluation. Attached you will find relevant portions of my assessment and plan of care.    If you have questions, please do not hesitate to call me. I look forward to following Maya rEvin along with you.    Sincerely,    IRINA Thornton MD    Enclosure  CC:  No Recipients    If you would like to receive this communication electronically, please contact [email or web address] or [phone] to request more information on [employer] access.    For providers and/or their staff who would like to refer a patient to [facility], please contact us through our one-stop-shop provider referral line, [facility], at [phone].    If you feel you have received this communication in error or would no longer like to receive these types of communications, please e-mail [email or web address]          Fall Risk

## 2023-09-03 NOTE — ED ADULT NURSE REASSESSMENT NOTE - NS ED NURSE REASSESS COMMENT FT1
pt having multiple run pt having multiple runs of vtach   dr payne aware   multiple ekg completed and given to md   pt does not appear to have chest pain   pt placed on pads with cardiac monitor   safety maintained

## 2023-09-03 NOTE — CONSULT NOTE ADULT - NS ATTEND AMEND GEN_ALL_CORE FT
69 yo male with hx HTN, IDDM, HLD, active smoker (+1PPD), CAD s/p PCI, A-Fib on Eliquis, HFrEF (30%) s/p AICD/BVPPM, p/w expressive aphasia, right sided weakness and difficulty getting up from the ground/moving x2 days. AICD/PPM interrogated and showed atrial tachy/Afib w/rvr, possible SVT. Patient responds commands. Patent with probable neuro event. PO meds when able. IV amiodarone. IV beta. Neuro. Prognosis guarded

## 2023-09-03 NOTE — ED PROVIDER NOTE - CRITICAL CARE ATTENDING CONTRIBUTION TO CARE
62 male history of hypertension diabetes dyslipidemia CAD A-fib on Eliquis heart failure, SVT presents for evaluation of expressive aphasia right-sided weakness and difficulty getting up from the ground.  Patient's history obtained by son, mother and EMS.  Apparently patient was last seen normal somewhere between 1 PM and 6 PM yesterday.  Patient was unable to get up on the floor and EMS was called.  Patient, usually is awake alert and oriented but has been confused since being found yesterday.  The wife did not see an improvement and therefore his son called EMS.  On arrival patient here with a expressive aphasia right-sided weakness left facial droop S1-S2 tachycardic regular clear to auscultation bilaterally mild tachypnea soft nontender  Impression  Patient here with altered mental status patient with initial fingerstick is 75 afebrile rectally and stroke code was called with CT head showing no acute findings.  Upon return from the ED metabolic work-up done patient found to have a hemoconcentration white count 22,000 with normal creatinine and negative UA.  While in ED patient had A-fib RVR with runs of SVT.  The [employer] AICD was interrogated I spoke to the rep who said there was runs of SVT that were determined by aicd device not to be V. tach.  Patient started on amiodarone which decreased the ectopy improved the heart rate for the patient.  While the patient is awake, 22,000 symptoms the patient symptoms are ultimately likely due to acute ischemic stroke which is seen on CT perfusion is consistent based on exam and history.  The CT angio showed no large vessel occlusion.  The patient was given broad-spectrum antibiotics initially to cover possible meningitis as the patient was unable to be tapped due to recent anticoagulation intake however patient might have a developing pneumonia.  Cardiology was consulted neurology and ICU and the patient admitted to intensive care unit with every neurochecks. 62 male history of hypertension diabetes dyslipidemia CAD A-fib on Eliquis heart failure, SVT presents for evaluation of expressive aphasia right-sided weakness and difficulty getting up from the ground.  Patient's history obtained by son, mother and EMS.  Apparently patient was last seen normal somewhere between 1 PM and 6 PM yesterday.  Patient was unable to get up on the floor and EMS was called.  Patient, usually is awake alert and oriented but has been confused since being found yesterday.  The wife did not see an improvement and therefore his son called EMS.  On arrival patient here with a expressive aphasia right-sided weakness left facial droop S1-S2 tachycardic regular clear to auscultation bilaterally mild tachypnea soft nontender  Impression  Patient here with altered mental status patient with initial fingerstick is 75, afebrile rectally and stroke code was called with CT head showing no acute findings.  Upon return from the ED metabolic work-up done patient found to have a hemoconcentration white count 22,000 with normal creatinine and negative UA.  While in ED patient had A-fib RVR with runs of SVT.  The [employer] AICD was interrogated I spoke to the rep who said there was runs of SVT that were determined by aicd device not to be V. tach.  Patient started on amiodarone which decreased the ectopy improved the heart rate for the patient.  While the patient is awake, 22,000 symptoms the patient symptoms are ultimately likely due to acute ischemic stroke which is seen on CT perfusion is consistent based on exam and history.  The CT angio showed no large vessel occlusion.  The patient was given broad-spectrum antibiotics initially to cover possible meningitis as the patient was unable to be tapped due to recent anticoagulation intake however patient might have a developing pneumonia.  Cardiology was consulted neurology and ICU and the patient admitted to intensive care unit with every hr neurochecks.

## 2023-09-03 NOTE — ED PROVIDER NOTE - CLINICAL SUMMARY MEDICAL DECISION MAKING FREE TEXT BOX
Patient here with altered mental status patient with initial fingerstick is 75, afebrile rectally and stroke code was called with CT head showing no acute findings.  Upon return from the ED metabolic work-up done patient found to have a hemoconcentration white count 22,000 with normal creatinine and negative UA.  While in ED patient had A-fib RVR with runs of SVT.  The [employer] AICD was interrogated I spoke to the rep who said there was runs of SVT that were determined by aicd device not to be V. tach.  Patient started on amiodarone which decreased the ectopy improved the heart rate for the patient.  While the patient is awake, 22,000 symptoms the patient symptoms are ultimately likely due to acute ischemic stroke which is seen on CT perfusion is consistent based on exam and history.  The CT angio showed no large vessel occlusion.  The patient was given broad-spectrum antibiotics initially to cover possible meningitis as the patient was unable to be tapped due to recent anticoagulation intake however patient might have a developing pneumonia.  Cardiology was consulted neurology and ICU and the patient admitted to intensive care unit with every hr neurochecks.

## 2023-09-03 NOTE — H&P ADULT - ASSESSMENT
67 yo male with hx HTN, IDDM, HLD, active smoker (+1PPD), CAD s/p PCI, A-Fib on Eliquis, HFrEF (30%) s/p AICD p/w expressive aphasia, right sided weakness and difficulty getting up from the ground since yesterday morning.  Last known normal was approximately 9/1 evening, pt is AAOx3, conversational and ambulates independently at baseline as per spouse.    #Suspected left MCA ischemic stroke  -CT head: no acute infarct  -CT Perfusion: Large (96 mL) volume of decreased cerebral blood flow within the left MCA   territory, concerning for ischemia  -Repeat CT head tonight at 9pm  -Continue Plavix, Eliquis, Statin  -q1h neuro checks until repeat CT head, if negative can do q4h neuro checks  -maintain systolic -180  -PT/OT/ST  -NGT if fail s/s  -A1c, lipid profile  -Above plan discussed with Dr. Rolle    #A-Fib with RVR  #Episodes of SVTs  -started on Amio gtt:  -> 110s  -cont Eliquis  - holding BB for stroke permissive HTN goal; can resume if BP or HR rises  -cardio on board; f/u recs    #Leukocytosis  #Erythrocytosis  -UA negative  -f/u Chest x-ray, procal  -repeat CBC post fluids - likely hemoconcentrated. if not improving consider heme eval  -empiric antibiotics for now  -f/u ID    #CAD s/p PCI - stable, EKG no ischemic changes, elevated troponin likely demand ischemia trend, cont Plavix, statin  #HFrEF (30%) s/p AICD - stable, resume lasix PO 40 mg tomorrow, cont Entresto, hold BB for permissive BP goal  #HTN - permissive HTN as above; hold metoprolol for now on Amio gtt  #IDDM - on high dose insulin regimen at home, however pt npo until s/s and fg low. would monitor FG closely and consider insulin gtt if needed  #HLD - statin as above  #Active smoker (+1PPD) - encourage cessation; offer nicotine replacement    #Misc  DVT ppx- Eliquis  Diet- NPO until s/s  Activity-IAT  Code- Full      67 yo male with hx HTN, IDDM, HLD, active smoker (+1PPD), CAD s/p PCI, A-Fib on Eliquis, HFrEF (30%) s/p AICD p/w expressive aphasia, right sided weakness and difficulty getting up from the ground since yesterday morning.  Last known normal was approximately 9/1 evening, pt is AAOx3, conversational and ambulates independently at baseline as per spouse.    #Suspected left MCA ischemic stroke  -CT head: no acute infarct  -CT Perfusion: Large (96 mL) volume of decreased cerebral blood flow within the left MCA   territory, concerning for ischemia  -Repeat CT head tonight at 9pm  -Continue Plavix, Eliquis, Statin  -q1h neuro checks until repeat CT head, if negative can do q4h neuro checks  -maintain systolic -180  -PT/OT/ST  -NGT if fail s/s  -A1c, lipid profile  -Above plan discussed with Dr. Rolle    #A-Fib with RVR  #Episodes of SVTs  -started on Amio gtt:  -> 110s  -cont Eliquis  - holding BB for stroke permissive HTN goal; can resume if BP or HR rises  -cardio on board; f/u recs    #Leukocytosis  #Erythrocytosis  -UA negative  -f/u Chest x-ray, procal  -repeat CBC post fluids - likely hemoconcentrated. if not improving consider heme eval  -empiric antibiotics for now Vanc  Cefepime  -f/u ID    #CAD s/p PCI - stable, EKG no ischemic changes, elevated troponin likely demand ischemia trend, cont Plavix, statin  #HFrEF (30%) s/p AICD - stable, resume lasix PO 40 mg tomorrow, cont Entresto, hold BB for permissive BP goal  #HTN - permissive HTN as above; hold metoprolol for now on Amio gtt  #IDDM - on high dose insulin regimen at home, however pt npo until s/s and fg low. would monitor FG closely and consider insulin gtt if needed  #HLD - statin as above  #Active smoker (+1PPD) - encourage cessation; offer nicotine replacement    #Misc  DVT ppx- Eliquis  Diet- NPO until s/s  Activity-IAT  Code- Full      69 yo male with hx HTN, IDDM, HLD, active smoker (+1PPD), CAD s/p PCI, A-Fib on Eliquis, HFrEF (30%) s/p AICD p/w expressive aphasia, right sided weakness and difficulty getting up from the ground since yesterday morning.  Last known normal was approximately 9/1 evening, pt is AAOx3, conversational and ambulates independently at baseline as per spouse.    #Suspected left MCA ischemic stroke  -CT head: no acute infarct  -CT Perfusion: Large (96 mL) volume of decreased cerebral blood flow within the left MCA   territory, concerning for ischemia  -Repeat CT head tonight at 9pm  -Continue Plavix, Eliquis, Statin  -q1h neuro checks until repeat CT head, if negative can do q4h neuro checks  -maintain systolic -180  -PT/OT/ST  -NGT if fail s/s  -A1c, lipid profile  -Above plan discussed with Dr. Rolle    #A-Fib with RVR  #Episodes of SVTs  -started on Amio gtt:  -> 110s  -cont Eliquis  - holding BB for stroke permissive HTN goal; can resume if BP or HR rises  -cardio on board; f/u recs    #Leukocytosis  #Erythrocytosis  -UA negative  -f/u Chest x-ray high risk for aspiration pna, procal  -repeat CBC post fluids - likely hemoconcentrated. if not improving consider heme eval  -empiric antibiotics for now Vanc  Cefepime  -f/u ID    #CAD s/p PCI - stable, EKG no ischemic changes, elevated troponin likely demand ischemia trend, cont Plavix, statin  #HFrEF (30%) s/p AICD - stable, resume lasix PO 40 mg tomorrow, cont Entresto, hold BB for permissive BP goal  #HTN - permissive HTN as above; hold metoprolol for now on Amio gtt  #IDDM - on high dose insulin regimen at home, however pt npo until s/s and fg low. would monitor FG closely and consider insulin gtt if needed  #HLD - statin as above  #Active smoker (+1PPD) - encourage cessation; offer nicotine replacement    #Misc  DVT ppx- Eliquis  Diet- NPO until s/s  Activity-IAT  Code- Full      69 yo male with hx HTN, IDDM, HLD, active smoker (+1PPD), CAD s/p PCI, A-Fib on Eliquis, HFrEF (30%) s/p AICD p/w expressive aphasia, right sided weakness and difficulty getting up from the ground since yesterday morning.  Last known normal was approximately 9/1 evening, pt is AAOx3, conversational and ambulates independently at baseline as per spouse.    #Suspected left MCA ischemic stroke  -CT head: no acute infarct  -CT Perfusion: Large (96 mL) volume of decreased cerebral blood flow within the left MCA   territory, concerning for ischemia  -Repeat CT head tonight at 9pm  -Continue Plavix, Eliquis, Statin  -q1h neuro checks until repeat CT head, if negative can do q4h neuro checks  -maintain systolic -180  -PT/OT/ST  -NGT if fail s/s  -A1c, lipid profile  -Above plan discussed with Dr. Rolle    #A-Fib with RVR  #Episodes of SVTs  -started on Amio gtt:  -> 110s  -cont Eliquis  - holding BB for stroke permissive HTN goal; can resume if BP or HR rises  -cardio on board; f/u recs    #Leukocytosis w/ elevated lactate  #Erythrocytosis  -UA negative  -f/u Chest x-ray high risk for aspiration pna, procal  -repeat CBC post fluids - likely hemoconcentrated. if not improving consider heme eval  -empiric antibiotics for now Vanc  Cefepime  -trend lactate; goal directed fluid resuscitation   -f/u ID    #CAD s/p PCI - stable, EKG no ischemic changes, elevated troponin likely demand ischemia trend, cont Plavix, statin  #HFrEF (30%) s/p AICD - stable, resume lasix PO 40 mg tomorrow, cont Entresto, hold BB for permissive BP goal  #HTN - permissive HTN as above; hold metoprolol for now on Amio gtt  #IDDM - on high dose insulin regimen at home, however pt npo until s/s and fg low. would monitor FG closely and consider insulin gtt if needed  #HLD - statin as above  #Active smoker (+1PPD) - encourage cessation; offer nicotine replacement    #Misc  DVT ppx- Eliquis  Diet- NPO until s/s  Activity-IAT  Code- Full

## 2023-09-03 NOTE — H&P ADULT - NSHPPHYSICALEXAM_GEN_ALL_CORE
PHYSICAL EXAM  GENERAL: No acute distress  NEURO: Awake, alert, follows commands closes eyes and squeezes fingers. +Expressive aphasia w/o receptive aphasia. +Unable to raise right arm or right leg. +L facial droop. Decreased handgrip strength R>L. Exam limited 2/2 pt cannot express clearly responses to commands  HEAD: Atraumatic, normocephalic  EYES: EOMI. Sclera non-icteric  NECK: No cervical lymphadenopathy  LUNGS: No Labored Breathing. No accessory muscle use  HEART: Normal rate  ABDOMEN: Soft, Nontender, Not distended.  EXTREMITIES: No edema, no cyanosis  SKIN: Warm and dry

## 2023-09-03 NOTE — H&P ADULT - NSHPLABSRESULTS_GEN_ALL_CORE
19.2   22.31 )-----------( 276      ( 03 Sep 2023 12:50 )             56.7           141  |  102  |  23<H>  ----------------------------<  68<L>  4.8   |  20  |  1.6<H>    Ca    10.4      03 Sep 2023 12:50    TPro  7.1  /  Alb  4.4  /  TBili  0.8  /  DBili  x   /  AST  30  /  ALT  33  /  AlkPhos  39                Urinalysis Basic - ( 03 Sep 2023 14:02 )    Color: Dark Yellow / Appearance: Clear / S.024 / pH: x  Gluc: x / Ketone: Trace mg/dL  / Bili: Negative / Urobili: 1.0 mg/dL   Blood: x / Protein: 300 mg/dL / Nitrite: Negative   Leuk Esterase: Negative / RBC: 1 /HPF / WBC 2 /HPF   Sq Epi: x / Non Sq Epi: 3 /HPF / Bacteria: Few /HPF        PT/INR - ( 03 Sep 2023 12:50 )   PT: 16.60 sec;   INR: 1.44 ratio         PTT - ( 03 Sep 2023 12:50 )  PTT:44.3 sec    Lactate Trend   @ 15:15 Lactate:2.9       CARDIAC MARKERS ( 03 Sep 2023 13:18 )  x     / x     / 454 U/L / x     / x      CARDIAC MARKERS ( 03 Sep 2023 12:50 )  x     / 0.05 ng/mL / x     / x     / x            CAPILLARY BLOOD GLUCOSE      POCT Blood Glucose.: 114 mg/dL (03 Sep 2023 14:57)

## 2023-09-03 NOTE — ED PROVIDER NOTE - PROGRESS NOTE DETAILS
lactate elevated ivf added on, straight cath to be performed ct head negative device interrogated, awaiting [employer] call back. calls placed to radiology to read ct. no vt as per [employer], seen also by cards NP approved to ICU

## 2023-09-03 NOTE — ED ADULT NURSE NOTE - NSFALLRISKINTERV_ED_ALL_ED
Assistance OOB with selected safe patient handling equipment if applicable/Assistance with ambulation/Communicate fall risk and risk factors to all staff, patient, and family/Monitor gait and stability/Monitor for mental status changes and reorient to person, place, and time, as needed/Move patient closer to nursing station/within visual sight of ED staff/Provide visual cue: yellow wristband, yellow gown, etc/Reinforce activity limits and safety measures with patient and family/Toileting schedule using arm’s reach rule for commode and bathroom/Use of alarms - bed, stretcher, chair and/or video monitoring/Call bell, personal items and telephone in reach/Instruct patient to call for assistance before getting out of bed/chair/stretcher/Non-slip footwear applied when patient is off stretcher/[place] to call system/Physically safe environment - no spills, clutter or unnecessary equipment/Purposeful Proactive Rounding/Room/bathroom lighting operational, light cord in reach

## 2023-09-03 NOTE — ED PROVIDER NOTE - PHYSICAL EXAMINATION
--EXAM--  VITAL SIGNS: I have reviewed vs documented at present.  CONSTITUTIONAL: patient is unable to provide history . patient is A&O X 0 . Patient is awake but not able to follow commands   SKIN: Warm and dry, no acute rash.   HEAD: Normocephalic; atraumatic.    NECK: Supple; non tender.  CARD: S1, S2, Regular rate and rhythm.   RESP: No wheezes, rales or rhonchi.  ABD: Normal bowel sounds; soft; non-distended; non-tender.    NEURO: patient is moving all extremities but is not able to follow commands

## 2023-09-04 LAB
A1C WITH ESTIMATED AVERAGE GLUCOSE RESULT: 6.1 % — HIGH (ref 4–5.6)
ALBUMIN SERPL ELPH-MCNC: 3.9 G/DL — SIGNIFICANT CHANGE UP (ref 3.5–5.2)
ALP SERPL-CCNC: 34 U/L — SIGNIFICANT CHANGE UP (ref 30–115)
ALT FLD-CCNC: 35 U/L — SIGNIFICANT CHANGE UP (ref 0–41)
AMPHET UR-MCNC: NEGATIVE — SIGNIFICANT CHANGE UP
ANION GAP SERPL CALC-SCNC: 14 MMOL/L — SIGNIFICANT CHANGE UP (ref 7–14)
APTT BLD: 46.4 SEC — HIGH (ref 27–39.2)
APTT BLD: 52.6 SEC — HIGH (ref 27–39.2)
APTT BLD: 53.1 SEC — HIGH (ref 27–39.2)
AST SERPL-CCNC: 67 U/L — HIGH (ref 0–41)
BARBITURATES UR SCN-MCNC: NEGATIVE — SIGNIFICANT CHANGE UP
BASOPHILS # BLD AUTO: 0.1 K/UL — SIGNIFICANT CHANGE UP (ref 0–0.2)
BASOPHILS NFR BLD AUTO: 0.6 % — SIGNIFICANT CHANGE UP (ref 0–1)
BENZODIAZ UR-MCNC: NEGATIVE — SIGNIFICANT CHANGE UP
BILIRUB SERPL-MCNC: 0.7 MG/DL — SIGNIFICANT CHANGE UP (ref 0.2–1.2)
BUN SERPL-MCNC: 25 MG/DL — HIGH (ref 10–20)
CALCIUM SERPL-MCNC: 9.1 MG/DL — SIGNIFICANT CHANGE UP (ref 8.4–10.5)
CHLORIDE SERPL-SCNC: 107 MMOL/L — SIGNIFICANT CHANGE UP (ref 98–110)
CHOLEST SERPL-MCNC: 118 MG/DL — SIGNIFICANT CHANGE UP
CO2 SERPL-SCNC: 22 MMOL/L — SIGNIFICANT CHANGE UP (ref 17–32)
COCAINE METAB.OTHER UR-MCNC: NEGATIVE — SIGNIFICANT CHANGE UP
CREAT SERPL-MCNC: 1.5 MG/DL — SIGNIFICANT CHANGE UP (ref 0.7–1.5)
EGFR: 50 ML/MIN/1.73M2 — LOW
EOSINOPHIL # BLD AUTO: 0.09 K/UL — SIGNIFICANT CHANGE UP (ref 0–0.7)
EOSINOPHIL NFR BLD AUTO: 0.5 % — SIGNIFICANT CHANGE UP (ref 0–8)
ESTIMATED AVERAGE GLUCOSE: 128 MG/DL — HIGH (ref 68–114)
GLUCOSE BLDC GLUCOMTR-MCNC: 112 MG/DL — HIGH (ref 70–99)
GLUCOSE BLDC GLUCOMTR-MCNC: 161 MG/DL — HIGH (ref 70–99)
GLUCOSE BLDC GLUCOMTR-MCNC: 169 MG/DL — HIGH (ref 70–99)
GLUCOSE BLDC GLUCOMTR-MCNC: 241 MG/DL — HIGH (ref 70–99)
GLUCOSE BLDC GLUCOMTR-MCNC: 47 MG/DL — CRITICAL LOW (ref 70–99)
GLUCOSE BLDC GLUCOMTR-MCNC: 82 MG/DL — SIGNIFICANT CHANGE UP (ref 70–99)
GLUCOSE SERPL-MCNC: 34 MG/DL — CRITICAL LOW (ref 70–99)
HCT VFR BLD CALC: 50.3 % — SIGNIFICANT CHANGE UP (ref 42–52)
HDLC SERPL-MCNC: 28 MG/DL — LOW
HGB BLD-MCNC: 16.6 G/DL — SIGNIFICANT CHANGE UP (ref 14–18)
IMM GRANULOCYTES NFR BLD AUTO: 0.5 % — HIGH (ref 0.1–0.3)
LIPID PNL WITH DIRECT LDL SERPL: 61 MG/DL — SIGNIFICANT CHANGE UP
LYMPHOCYTES # BLD AUTO: 13.1 % — LOW (ref 20.5–51.1)
LYMPHOCYTES # BLD AUTO: 2.19 K/UL — SIGNIFICANT CHANGE UP (ref 1.2–3.4)
MAGNESIUM SERPL-MCNC: 2.3 MG/DL — SIGNIFICANT CHANGE UP (ref 1.8–2.4)
MCHC RBC-ENTMCNC: 29.6 PG — SIGNIFICANT CHANGE UP (ref 27–31)
MCHC RBC-ENTMCNC: 33 G/DL — SIGNIFICANT CHANGE UP (ref 32–37)
MCV RBC AUTO: 89.8 FL — SIGNIFICANT CHANGE UP (ref 80–94)
METHADONE UR-MCNC: NEGATIVE — SIGNIFICANT CHANGE UP
MONOCYTES # BLD AUTO: 1.42 K/UL — HIGH (ref 0.1–0.6)
MONOCYTES NFR BLD AUTO: 8.5 % — SIGNIFICANT CHANGE UP (ref 1.7–9.3)
NEUTROPHILS # BLD AUTO: 12.84 K/UL — HIGH (ref 1.4–6.5)
NEUTROPHILS NFR BLD AUTO: 76.8 % — HIGH (ref 42.2–75.2)
NON HDL CHOLESTEROL: 90 MG/DL — SIGNIFICANT CHANGE UP
NRBC # BLD: 0 /100 WBCS — SIGNIFICANT CHANGE UP (ref 0–0)
OPIATES UR-MCNC: NEGATIVE — SIGNIFICANT CHANGE UP
PCP SPEC-MCNC: SIGNIFICANT CHANGE UP
PLATELET # BLD AUTO: 235 K/UL — SIGNIFICANT CHANGE UP (ref 130–400)
PMV BLD: 11.6 FL — HIGH (ref 7.4–10.4)
POTASSIUM SERPL-MCNC: 4.6 MMOL/L — SIGNIFICANT CHANGE UP (ref 3.5–5)
POTASSIUM SERPL-SCNC: 4.6 MMOL/L — SIGNIFICANT CHANGE UP (ref 3.5–5)
PROPOXYPHENE QUALITATIVE URINE RESULT: NEGATIVE — SIGNIFICANT CHANGE UP
PROT SERPL-MCNC: 5.8 G/DL — LOW (ref 6–8)
RBC # BLD: 5.6 M/UL — SIGNIFICANT CHANGE UP (ref 4.7–6.1)
RBC # FLD: 13.6 % — SIGNIFICANT CHANGE UP (ref 11.5–14.5)
SODIUM SERPL-SCNC: 143 MMOL/L — SIGNIFICANT CHANGE UP (ref 135–146)
T3 SERPL-MCNC: 80 NG/DL — SIGNIFICANT CHANGE UP (ref 80–200)
T4 AB SER-ACNC: 6.6 UG/DL — SIGNIFICANT CHANGE UP (ref 4.6–12)
T4 FREE SERPL-MCNC: 1.2 NG/DL — SIGNIFICANT CHANGE UP (ref 0.9–1.8)
TRIGL SERPL-MCNC: 144 MG/DL — SIGNIFICANT CHANGE UP
TROPONIN T SERPL-MCNC: 0.07 NG/ML — CRITICAL HIGH
TSH SERPL-MCNC: 2.06 UIU/ML — SIGNIFICANT CHANGE UP (ref 0.27–4.2)
WBC # BLD: 16.73 K/UL — HIGH (ref 4.8–10.8)
WBC # FLD AUTO: 16.73 K/UL — HIGH (ref 4.8–10.8)

## 2023-09-04 PROCEDURE — 99223 1ST HOSP IP/OBS HIGH 75: CPT

## 2023-09-04 PROCEDURE — 99233 SBSQ HOSP IP/OBS HIGH 50: CPT

## 2023-09-04 PROCEDURE — 99291 CRITICAL CARE FIRST HOUR: CPT

## 2023-09-04 PROCEDURE — 93010 ELECTROCARDIOGRAM REPORT: CPT

## 2023-09-04 PROCEDURE — 99222 1ST HOSP IP/OBS MODERATE 55: CPT

## 2023-09-04 PROCEDURE — 93306 TTE W/DOPPLER COMPLETE: CPT | Mod: 26

## 2023-09-04 PROCEDURE — 95816 EEG AWAKE AND DROWSY: CPT | Mod: 26

## 2023-09-04 RX ORDER — DEXTROSE 50 % IN WATER 50 %
50 SYRINGE (ML) INTRAVENOUS ONCE
Refills: 0 | Status: COMPLETED | OUTPATIENT
Start: 2023-09-04 | End: 2023-09-04

## 2023-09-04 RX ORDER — SODIUM CHLORIDE 9 MG/ML
1000 INJECTION, SOLUTION INTRAVENOUS
Refills: 0 | Status: DISCONTINUED | OUTPATIENT
Start: 2023-09-04 | End: 2023-09-09

## 2023-09-04 RX ORDER — NICOTINE POLACRILEX 2 MG
1 GUM BUCCAL DAILY
Refills: 0 | Status: DISCONTINUED | OUTPATIENT
Start: 2023-09-04 | End: 2023-09-09

## 2023-09-04 RX ORDER — HEPARIN SODIUM 5000 [USP'U]/ML
1100 INJECTION INTRAVENOUS; SUBCUTANEOUS
Qty: 25000 | Refills: 0 | Status: DISCONTINUED | OUTPATIENT
Start: 2023-09-04 | End: 2023-09-04

## 2023-09-04 RX ORDER — METOPROLOL TARTRATE 50 MG
100 TABLET ORAL
Refills: 0 | Status: DISCONTINUED | OUTPATIENT
Start: 2023-09-04 | End: 2023-09-09

## 2023-09-04 RX ORDER — INSULIN LISPRO 100/ML
VIAL (ML) SUBCUTANEOUS
Refills: 0 | Status: DISCONTINUED | OUTPATIENT
Start: 2023-09-04 | End: 2023-09-09

## 2023-09-04 RX ORDER — INSULIN LISPRO 100/ML
2 VIAL (ML) SUBCUTANEOUS ONCE
Refills: 0 | Status: COMPLETED | OUTPATIENT
Start: 2023-09-04 | End: 2023-09-04

## 2023-09-04 RX ORDER — DEXTROSE 50 % IN WATER 50 %
25 SYRINGE (ML) INTRAVENOUS ONCE
Refills: 0 | Status: DISCONTINUED | OUTPATIENT
Start: 2023-09-04 | End: 2023-09-09

## 2023-09-04 RX ORDER — DEXTROSE 50 % IN WATER 50 %
15 SYRINGE (ML) INTRAVENOUS ONCE
Refills: 0 | Status: DISCONTINUED | OUTPATIENT
Start: 2023-09-04 | End: 2023-09-09

## 2023-09-04 RX ORDER — DEXTROSE 50 % IN WATER 50 %
12.5 SYRINGE (ML) INTRAVENOUS ONCE
Refills: 0 | Status: DISCONTINUED | OUTPATIENT
Start: 2023-09-04 | End: 2023-09-09

## 2023-09-04 RX ORDER — GLUCAGON INJECTION, SOLUTION 0.5 MG/.1ML
1 INJECTION, SOLUTION SUBCUTANEOUS ONCE
Refills: 0 | Status: DISCONTINUED | OUTPATIENT
Start: 2023-09-04 | End: 2023-09-09

## 2023-09-04 RX ORDER — METOPROLOL TARTRATE 50 MG
5 TABLET ORAL EVERY 6 HOURS
Refills: 0 | Status: DISCONTINUED | OUTPATIENT
Start: 2023-09-04 | End: 2023-09-04

## 2023-09-04 RX ORDER — HEPARIN SODIUM 5000 [USP'U]/ML
1400 INJECTION INTRAVENOUS; SUBCUTANEOUS
Qty: 25000 | Refills: 0 | Status: DISCONTINUED | OUTPATIENT
Start: 2023-09-04 | End: 2023-09-08

## 2023-09-04 RX ADMIN — Medication 1 PATCH: at 21:25

## 2023-09-04 RX ADMIN — HEPARIN SODIUM 14 UNIT(S)/HR: 5000 INJECTION INTRAVENOUS; SUBCUTANEOUS at 23:00

## 2023-09-04 RX ADMIN — ATORVASTATIN CALCIUM 80 MILLIGRAM(S): 80 TABLET, FILM COATED ORAL at 21:14

## 2023-09-04 RX ADMIN — Medication 0: at 11:44

## 2023-09-04 RX ADMIN — Medication 1: at 17:55

## 2023-09-04 RX ADMIN — Medication 145 MILLIGRAM(S): at 11:51

## 2023-09-04 RX ADMIN — HEPARIN SODIUM 11 UNIT(S)/HR: 5000 INJECTION INTRAVENOUS; SUBCUTANEOUS at 05:57

## 2023-09-04 RX ADMIN — HEPARIN SODIUM 12 UNIT(S)/HR: 5000 INJECTION INTRAVENOUS; SUBCUTANEOUS at 21:14

## 2023-09-04 RX ADMIN — Medication 300 MILLIGRAM(S): at 21:15

## 2023-09-04 RX ADMIN — Medication 50 MILLILITER(S): at 07:58

## 2023-09-04 RX ADMIN — HEPARIN SODIUM 11 UNIT(S)/HR: 5000 INJECTION INTRAVENOUS; SUBCUTANEOUS at 03:35

## 2023-09-04 RX ADMIN — Medication 1 PATCH: at 11:39

## 2023-09-04 RX ADMIN — CEFEPIME 100 MILLIGRAM(S): 1 INJECTION, POWDER, FOR SOLUTION INTRAMUSCULAR; INTRAVENOUS at 06:29

## 2023-09-04 RX ADMIN — Medication 100 MILLIGRAM(S): at 15:10

## 2023-09-04 RX ADMIN — Medication 2 UNIT(S): at 23:55

## 2023-09-04 RX ADMIN — CEFEPIME 100 MILLIGRAM(S): 1 INJECTION, POWDER, FOR SOLUTION INTRAMUSCULAR; INTRAVENOUS at 17:55

## 2023-09-04 RX ADMIN — CLOPIDOGREL BISULFATE 75 MILLIGRAM(S): 75 TABLET, FILM COATED ORAL at 11:43

## 2023-09-04 RX ADMIN — SACUBITRIL AND VALSARTAN 1 TABLET(S): 24; 26 TABLET, FILM COATED ORAL at 17:56

## 2023-09-04 NOTE — PATIENT PROFILE ADULT - FUNCTIONAL ASSESSMENT - BASIC MOBILITY 6.
1-calculated by average/Not able to assess (calculate score using [facility] averaging method)

## 2023-09-04 NOTE — EEG REPORT - NS EEG TEXT BOX
Brief Clinical History:  MONIQUE LYONS is a 68 year old Male; study performed to investigate for seizures or markers of epilepsy.   Technologist notes: AFIB, HTN ,HLD, IDDM, CAD, DYSLIPIDEMA, COPD, CHF, AMS, DM    Diagnosis Code:  R56.9 convulsions/seizure  CPT:   09815 (awake/sleep)     Pertinent Medication:	  n/a    Acquisition Details:  Electroencephalography was acquired using a minimum of 21 channels on an [employer] system v 9.3.1 with electrode placement according to the standard International 10-20 system following ACNS (American Clinical Neurophysiology Society) guidelines.  Anterior temporal T1 and T2 electrodes were utilized whenever possible    Findings:  Background:  continuous, with predominantly alpha and beta frequencies.  Voltage:  Normal (20+ uV)  Generalized Slowing:  Intermittent frequent sporadic polymorhpic delta  Organization:  Appropriate anterior-posterior gradient  Posterior Dominant Rhythm:  9 Hz symmetric, well-organized, and poorly-modulated  Focal abnormalities:  No persistent asymmetries of voltage or frequency.  Sleep:  Symmetric spindles.  Spontaneous Activity:  No epileptiform discharges    Events:  No electrographic seizures or significant clinical events occurred during this study  Provocations:  1)	Hyperventilation: was not performed.  2)	Photic stimulation: was not performed.  Impression:  Abnormal routine EEG, awake and drowsy    1)	There was mild excess intermittent slow wave activity    Final Clinical Correlation:  1)	There were indicators of Diffuse or multifocal cerebral dysfunction      Robina Lyn MD  Attending Neurologist, [facility]

## 2023-09-04 NOTE — OCCUPATIONAL THERAPY INITIAL EVALUATION ADULT - RANGE OF MOTION EXAMINATION, UPPER EXTREMITY
Left UE Active Assistive ROM was WNL (within normal limits)/Right UE Active Assistive ROM was WNL (within normal limits)

## 2023-09-04 NOTE — CONSULT NOTE ADULT - ATTENDING COMMENTS
Seen and examined the patient. Right arm and leg drift and expressive aphasia. Suspect cardioembolic stroke per perfusion scan. Two CT heads showed no acute infarct. CTA showed mild stenosis but no LVO. Currently on Heparin gtt. AFIB w RVR. REEG and Brain MRI. Q4H neuro check. TTE.

## 2023-09-04 NOTE — SPEECH LANGUAGE PATHOLOGY EVALUATION - 1-STEP
?oral apraxia - unable to smile for SLP despite max verbal visual and tactile cues, however later did for neurology w/o cues.

## 2023-09-04 NOTE — SPEECH LANGUAGE PATHOLOGY EVALUATION - COMMENTS
Evaluation was somewhat limited today d/t pt's frustration and session interrupted. Needs continued evaluation/tx. Difficulty assessing d/t severity of apraxia/aphasia

## 2023-09-04 NOTE — OCCUPATIONAL THERAPY INITIAL EVALUATION ADULT - COORDINATION ASSESSED, REHAB EVAL
No new care gaps identified.  [facility] Embedded Care Gaps. Reference number: [id number]. 12/20/2022   4:05:32 PM CST  
Refill Decision Note   Leticia Hornerbrent  is requesting a refill authorization.  Brief Assessment and Rationale for Refill:  Approve     Medication Therapy Plan:       Medication Reconciliation Completed: No   Comments:     No Care Gaps recommended.     Note composed:5:02 PM 12/20/2022          
B/L moderate impairment/finger to nose

## 2023-09-04 NOTE — OCCUPATIONAL THERAPY INITIAL EVALUATION ADULT - GENERAL OBSERVATIONS, REHAB EVAL
8:40-9:05. Chart reviewed, ok to treat by Occupational Therapist as confirmed by RN Leticia, Pt left as received in semi smith position +heplock +Mcmahon +2L 0@ via NC in NAD.

## 2023-09-04 NOTE — PHYSICAL THERAPY INITIAL EVALUATION ADULT - GENERAL OBSERVATIONS, REHAB EVAL
13:40-14:05. Chart reviewed; confirmed with RN to see the pt for PT. Pt ready for PT; received in bed with no complain of pain and in NAD. +hep lock, +O2 via NC, +tele, +bp cuff, +pulse ox, +IV R UE, +iyer. Agreeable for PT evaluation.

## 2023-09-04 NOTE — OCCUPATIONAL THERAPY INITIAL EVALUATION ADULT - LEVEL OF INDEPENDENCE: CHAIR TO BED, REHAB EVAL
[FreeTextEntry1] : f/u resistant HTN \par no complaints except still some dizziness when gets up after prolonged sitting (perhaps improved)- no presyncope sx - lasts abt minute and goes away when walks. only happens in pm not am \par BP has been running 110-140 systolic at home -- mean 125/69 over past  4 mos per flowsheet he brought in \par meds reviewed with pt and list updated\par labs done and reviewed - see below\par PCP Dr Goldberg \par \par  TBA when appropriate

## 2023-09-04 NOTE — CONSULT NOTE ADULT - ASSESSMENT
67 yo male with hx HTN, IDDM, HLD, active smoker (+1PPD), CAD s/p PCI, A-Fib on Eliquis, HFrEF (30%) s/p AICD p/w expressive aphasia, right sided weakness and difficulty getting up from the ground since yesterday morning. Last known normal was approximately 9/1 evening, pt is AAOx3, conversational and ambulates independently at baseline as per spouse. According to spouse, pt compliant with his medications. Stroke code called in ED, CTH neg, perfusion showed L sided perfusion defect, no LVO on angiogram, moderate stenosis of R carotid bulb. On exam today patient is aphasic with R sided drift. Rapid afib on monitor.  9/4 NIHSS 7 as above    # r/o cva  # afib rvr  - routine EEG  - MR brain no contrast  - q4h neuro checks  - -180  - management of afib per ICU/cardiology  - continue AC  - continue Plavix  - lipid panel/ hgb a1c/ tsh  - pt/ot/pmr/s&s

## 2023-09-04 NOTE — PHYSICAL THERAPY INITIAL EVALUATION ADULT - LIVES WITH, PROFILE
As per son, pt lives with family in a private home with ~8 steps to enter and ~15 steps inside with one handrail./children/spouse

## 2023-09-04 NOTE — OCCUPATIONAL THERAPY INITIAL EVALUATION ADULT - PERTINENT HX OF CURRENT PROBLEM, REHAB EVAL
67 yo male with hx HTN, IDDM, HLD, active smoker (+1PPD), CAD s/p PCI, A-Fib on Eliquis, HFrEF (30%) s/p AICD p/w expressive aphasia, right sided weakness and difficulty getting up from the ground prior to admission. pt is AAOx3, conversational and ambulates independently at baseline as per spouse. According to spouse, pt compliant with his medications. She denies any complaints of fevers, chest pain, sob, recent illness. History and ROS limited at this time 2/2 pt with speech difficulty, and spouse is poor historian.

## 2023-09-04 NOTE — CONSULT NOTE ADULT - SUBJECTIVE AND OBJECTIVE BOX
MONIQUE LYONS     68y     Male    MRN-[id number]                                                           CC:Patient is a 68y old  Male who presents with a chief complaint of weakness    Neuro: Patient aphasic, unable to provide history. rapid afib on monitor    HPI:  69 yo male with hx HTN, IDDM, HLD, active smoker (+1PPD), CAD s/p PCI, A-Fib on Eliquis, HFrEF (30%) s/p AICD p/w expressive aphasia, right sided weakness and difficulty getting up from the ground since yesterday morning.  Last known normal was approximately 9/1 evening, pt is AAOx3, conversational and ambulates independently at baseline as per spouse. According to spouse, pt compliant with his medications. She denies any complaints of fevers, chest pain, sob, recent illness. History and ROS limited at this time 2/2 pt with speech difficulty, and spouse is poor historian.     In the ED,   T(F): 99.4   HR: 145 A-Fib w/ RVR w/ runs of SVT started on Amio gtt. Cardio called, agree with plan, will follow. AICD was interrogated per ED, consistent with A-Fib w/ RVR and SVT.    BP: 179/76  RR: 30  SpO2: 96% on room air  Labs showed WBC 22k w/ NP, Hgb 19, Cr 1.6 baseline 1.4, Lactate 6.3 pH wnl, troponin 0.05. UA negative.   Stroke code on arrival. Imaging revealed large (96 mL) volume of decreased cerebral blood flow within the left MCA   territory, concerning for ischemia. No core infarct detected. No acute intervention.  Being admitted to MICU for q1h neuro checks.    (03 Sep 2023 16:17)      ROS:  Constitutional, Neurological, Psychiatric, Eyes, ENT, Cardiovascular, Respiratory, Gastrointestinal, Genitourinary, Musculoskeletal, Integumentary, Endocrine and Heme/Lymph are otherwise negative.     Social History: No drinking, No drug use, + smoking          Vital Signs Last 24 Hrs  T(C): 36.3 (04 Sep 2023 07:21), Max: 38.1 (03 Sep 2023 17:16)  T(F): 97.3 (04 Sep 2023 07:21), Max: 100.6 (03 Sep 2023 17:16)  HR: 133 (04 Sep 2023 09:00) (104 - 145)  BP: 171/106 (04 Sep 2023 08:00) (98/52 - 179/76)  BP(mean): 127 (04 Sep 2023 08:00) (78 - 127)  RR: 24 (04 Sep 2023 09:00) (22 - 36)  SpO2: 99% (04 Sep 2023 09:00) (94% - 99%)    Parameters below as of 04 Sep 2023 07:21  Patient On (Oxygen Delivery Method): nasal cannula          Neuro Exam:  patient in rapid afib at time of exam  Orientation: aphasic, unable to obtain, follows commands  Attention: normal concentrating ability and visual attention was not decreased.   Language: + aphasia, + dysarthria  Fund of knowledge: impaird  Cranial Nerves: + blink to threat, pupils equal round and reactive to light, extraocular motion intact, facial sensation intact symmetrically, face symmetrical, hearing was intact bilaterally  Motor: muscle tone was normal in all four extremities, normal bulk in all four extremities, + drift RUE, + drift RLE  Sensory exam: light touch was intact.   Coordination:. no dysmetria  Deep tendon reflexes:   Biceps right 2+. Biceps left 2+.    Triceps right 2+. Triceps left 2+.    Brachioradialis right 2+. Brachioradialis left 2+.    Patella right 1+. Patella left 1+.    Plantar responses normal on the right, normal on the left.      NIHSS: 7 (1 RUE drift, 1- RLE drift, 2- aphasia, 1- dysarthria, 2- questions)    Allergies    sulfa drugs (Unknown)         Home Medications:  atorvastatin 40 mg oral tablet: 1 tab(s) orally once a day (at bedtime) (05 Feb 2020 17:41)  clopidogrel 75 mg oral tablet: 1 tab(s) orally once a day (05 Feb 2020 17:41)  Entresto 24 mg-26 mg oral tablet: 1 tab(s) orally 2 times a day (05 Feb 2020 17:41)  ezetimibe 10 mg oral tablet: 1 orally once a day (03 Sep 2023 16:41)  fenofibrate 145 mg oral tablet: 1 orally once a day (03 Sep 2023 16:41)  furosemide 40 mg oral tablet: 1 orally once a day (03 Sep 2023 16:41)  Lunesta 1 mg oral tablet: 1 orally once a day (at bedtime) (03 Sep 2023 16:41)  metoprolol succinate 200 mg oral capsule, extended release: 1 cap(s) orally once a day (05 Feb 2020 17:41)  NovoLOG 100 units/mL injectable solution: 8 unit(s) injectable once a day (05 Feb 2020 17:41)  OMEGA-3 ETHYL ESTERS 1 GM CAP: 1 cap(s) orally once a day (05 Feb 2020 17:41)  Tresiba 100 units/mL subcutaneous solution: 150 unit(s) subcutaneous once a day (05 Feb 2020 17:41)      MEDICATIONS  (STANDING):  aMIOdarone Infusion 0.5 mG/Min (16.7 mL/Hr) IV Continuous <Continuous>  aMIOdarone Infusion 1 mG/Min (33.3 mL/Hr) IV Continuous <Continuous>  atorvastatin 80 milliGRAM(s) Oral at bedtime  cefepime   IVPB 2000 milliGRAM(s) IV Intermittent every 12 hours  clopidogrel Tablet 75 milliGRAM(s) Oral daily  dextrose 5%. 1000 milliLiter(s) (100 mL/Hr) IV Continuous <Continuous>  dextrose 5%. 1000 milliLiter(s) (50 mL/Hr) IV Continuous <Continuous>  dextrose 50% Injectable 25 Gram(s) IV Push once  dextrose 50% Injectable 12.5 Gram(s) IV Push once  dextrose 50% Injectable 25 Gram(s) IV Push once  fenofibrate Tablet 145 milliGRAM(s) Oral daily  furosemide    Tablet 40 milliGRAM(s) Oral daily  glucagon  Injectable 1 milliGRAM(s) IntraMuscular once  heparin  Infusion 1100 Unit(s)/Hr (11 mL/Hr) IV Continuous <Continuous>  insulin lispro (ADMELOG) corrective regimen sliding scale   SubCutaneous three times a day before meals  sacubitril 24 mG/valsartan 26 mG 1 Tablet(s) Oral two times a day  vancomycin  IVPB 1500 milliGRAM(s) IV Intermittent every 24 hours    MEDICATIONS  (PRN):  dextrose Oral Gel 15 Gram(s) Oral once PRN Blood Glucose LESS THAN 70 milliGRAM(s)/deciliter  zolpidem 5 milliGRAM(s) Oral at bedtime PRN Insomnia      LABS:                        16.6   16.73 )-----------( 235      ( 04 Sep 2023 06:14 )             50.3     09-04    143  |  107  |  25<H>  ----------------------------<  x   4.6   |  22  |  1.5    Ca    9.1      04 Sep 2023 06:14  Mg     2.3     09-04    TPro  5.8<L>  /  Alb  3.9  /  TBili  0.7  /  DBili  x   /  AST  67<H>  /  ALT  35  /  AlkPhos  34  09-04    PT/INR - ( 03 Sep 2023 12:50 )   PT: 16.60 sec;   INR: 1.44 ratio         PTT - ( 04 Sep 2023 02:16 )  PTT:53.1 sec      CT Brain Stroke Protocol (09.03.23 @ 12:43)   IMPRESSION:  No evidence of acute transcortical infarct, acute intracranial   hemorrhage, or mass effect.    CT Angio Brain Stroke Protocol  w/ IV Cont (09.03.23 @ 12:58)   IMPRESSION:  CT PERFUSION:  Large (96 mL) volume of decreased cerebral blood flow within the left MCA   territory, concerning for ischemia. No core infarct detected.    CTA HEAD/NECK:  Atherosclerotic plaques are seen in the bilateral carotid bulbs and   carotid siphons contributing to up to moderate stenosis in the right   carotid bulb.    CT Head No Cont (09.03.23 @ 21:49)   IMPRESSION:  No evidence of acute transcortical infarct, acute intracranial   hemorrhage, or mass effect.

## 2023-09-04 NOTE — OCCUPATIONAL THERAPY INITIAL EVALUATION ADULT - NS ASR FOLLOW COMMAND OT EVAL
Pt requires verbal cues to follow single-step instructions/able to follow single-step instructions/aphasia/oral apraxia

## 2023-09-04 NOTE — PHYSICAL THERAPY INITIAL EVALUATION ADULT - PERTINENT HX OF CURRENT PROBLEM, REHAB EVAL
67 yo male with hx HTN, IDDM, HLD, active smoker (+1PPD), CAD s/p PCI, A-Fib on Eliquis, HFrEF (30%) s/p AICD p/w expressive aphasia, right sided weakness and difficulty getting up from the ground since yesterday morning.  Last known normal was approximately 9/1 evening, pt is AAOx3, conversational and ambulates independently at baseline as per spouse. According to spouse, pt compliant with his medications. She denies any complaints of fevers, chest pain, sob, recent illness. History and ROS limited at this time 2/2 pt with speech difficulty, and spouse is poor historian.

## 2023-09-04 NOTE — CONSULT NOTE ADULT - SUBJECTIVE AND OBJECTIVE BOX
MONIQUE LYONS  68y, Male  Allergies    68y  Intolerances    Male    LOS  1d    HPI  HPI:  67 yo male with hx HTN, IDDM, HLD, active smoker (+1PPD), CAD s/p PCI, A-Fib on Eliquis, HFrEF (30%) s/p AICD p/w expressive aphasia, right sided weakness and difficulty getting up from the ground since yesterday morning.  Last known normal was approximately 9/ evening, pt is AAOx3, conversational and ambulates independently at baseline as per spouse. According to spouse, pt compliant with his medications. She denies any complaints of fevers, chest pain, sob, recent illness. History and ROS limited at this time 2/2 pt with speech difficulty, and spouse is poor historian.     In the ED,   T(F): 99.4   HR: 145 A-Fib w/ RVR w/ runs of SVT started on Amio gtt. Cardio called, agree with plan, will follow. AICD was interrogated per ED, consistent with A-Fib w/ RVR and SVT.    BP: 179/76  RR: 30  SpO2: 96% on room air  Labs showed WBC 22k w/ NP, Hgb 19, Cr 1.6 baseline 1.4, Lactate 6.3 pH wnl, troponin 0.05. UA negative.   Stroke code on arrival. Imaging revealed large (96 mL) volume of decreased cerebral blood flow within the left MCA   territory, concerning for ischemia. No core infarct detected. No acute intervention.  Being admitted to MICU for q1h neuro checks.    (03 Sep 2023 16:17)      INFECTIOUS DISEASE HISTORY:  Hospital course-  ID consulted for     Prior hospital charts reviewed [Yes]  Primary team notes reviewed [Yes]  Other consultant notes reviewed [Yes]    REVIEW OF SYSTEMS:  CONSTITUTIONAL: No fever or chills  HEENT: No sore throat  RESPIRATORY: No cough, no shortness of breath  CARDIOVASCULAR: No chest pain or palpitations  GASTROINTESTINAL: No abdominal or epigastric pain  GENITOURINARY: No dysuria  NEUROLOGICAL: No headache/dizziness  MSK: No joint pain, erythema, or swelling; no back pain  SKIN: No itching, rashes  All other ROS negative except noted above    PAST MEDICAL & SURGICAL HISTORY:  CHF (congestive heart failure)      Diabetes      CAD (coronary artery disease)      Chronic obstructive pulmonary disease (COPD)      HTN (hypertension)      Stented coronary artery      Dyslipidemia      GERD (gastroesophageal reflux disease)      Afib      H/O heart artery stent      Heart valve replaced  aortic      History of Nissen fundoplication          SOCIAL HISTORY:  - Born in [place], migrated [place] in 19XX  - Currently working as / Retired  - Lives with _____; no pets  - No recent travel  - Denies tobacco use, alcohol use, illicit drug use  - Currently sexually active, has one male/female sexual partner    FAMILY HISTORY:      Allergy: 1d    ANTIMICROBIALS:  cefepime   IVPB 2000 every 12 hours  vancomycin  IVPB 1500 every 24 hours      ANTIMICROBIALS (past 90 days):  MEDICATIONS  (STANDING):  cefepime   IVPB   100 mL/Hr IV Intermittent (23 @ 06:29)    cefTRIAXone   IVPB   100 mL/Hr IV Intermittent (23 @ 14:50)    vancomycin  IVPB   300 mL/Hr IV Intermittent (23 @ 23:14)    vancomycin  IVPB   250 mL/Hr IV Intermittent (23 @ 13:45)        OTHER MEDS:   MEDICATIONS  (STANDING):  aMIOdarone Infusion 0.5 <Continuous>  aMIOdarone Infusion 1 <Continuous>  atorvastatin 80 at bedtime  clopidogrel Tablet 75 daily  fenofibrate Tablet 145 daily  furosemide    Tablet 40 daily  heparin  Infusion 1100 <Continuous>  metoprolol tartrate Injectable 5 every 6 hours PRN  sacubitril 24 mG/valsartan 26 mG 1 two times a day  zolpidem 5 at bedtime PRN      VITALS:  Vital Signs Last 24 Hrs  T(F): 97.6 (23 @ 03:00), Max: 100.6 (23 @ 17:16)    Vital Signs Last 24 Hrs  HR: 120 (23 @ 06:00) (104 - 145)  BP: 140/72 (23 @ 06:00) (98/52 - 179/76)  RR: 27 (23 @ 06:00)  SpO2: 98% (23 @ 06:00) (94% - 98%)  Wt(kg): --    EXAM:  GENERAL: NAD, lying in bed  HEAD: No head lesions  NECK: Supple, nontender to palpation; no JVD  CHEST/LUNG: Clear to auscultation bilaterally  HEART: S1 S2  ABDOMEN: Soft, nontender, nondistended; normoactive bowel sounds  EXTREMITIES: No clubbing, cyanosis, or petal edema  NERVOUS SYSTEM: Alert and oriented to person, time, place and situation, speech clear. No focal deficits   MSK: No joint erythema, swelling or pain  SKIN: No rashes or lesions, no superficial thrombophlebitis    Labs:                        17.5   19.86 )-----------( 255      ( 03 Sep 2023 18:52 )             52.6         141  |  102  |  23<H>  ----------------------------<  68<L>  4.8   |  20  |  1.6<H>    Ca    10.4      03 Sep 2023 12:50    TPro  7.1  /  Alb  4.4  /  TBili  0.8  /  DBili  x   /  AST  30  /  ALT  33  /  AlkPhos  39        WBC Trend:  WBC Count: 19.86 (23 @ 18:52)  WBC Count: 22.31 (23 @ 12:50)      Auto Neutrophil #: 20.05 K/uL (23 @ 12:50)  Band Neutrophils %: 15.0 % (23 @ 12:50)  Auto Neutrophil #: 4.17 K/uL (22 @ 14:46)      Creatine Trend:  Creatinine: 1.6 ()      Liver Biochemical Testing Trend:  Alanine Aminotransferase (ALT/SGPT): 33 ()  Alanine Aminotransferase (ALT/SGPT): 48 *H* ()  Aspartate Aminotransferase (AST/SGOT): 30 (23 @ 12:50)  Aspartate Aminotransferase (AST/SGOT): 43 (22 @ 14:46)  Bilirubin Total: 0.8 ()  Bilirubin Total, Serum: 0.7 ()      Trend LDH      Auto Eosinophil %: 0.0 % (23 @ 12:50)      Urinalysis Basic - ( 03 Sep 2023 14:02 )    Color: Dark Yellow / Appearance: Clear / S.024 / pH: x  Gluc: x / Ketone: Trace mg/dL  / Bili: Negative / Urobili: 1.0 mg/dL   Blood: x / Protein: 300 mg/dL / Nitrite: Negative   Leuk Esterase: Negative / RBC: 1 /HPF / WBC 2 /HPF   Sq Epi: x / Non Sq Epi: 3 /HPF / Bacteria: Few /HPF        MICROBIOLOGY:    Male                                                  Lactate, Blood: 2.0 ( @ 18:52)  Lactate, Blood: 2.9 ( @ 15:15)  Blood Gas Venous - Lactate: 6.30 ( @ 13:18)        INFLAMMATORY MARKERS      RADIOLOGY & ADDITIONAL TESTS:  I have personally reviewed the imagings.  CXR      CT      CARDIOLOGY TESTING             TOSINGEMAMONIQUE  68y, Male  .Allergies    sulfa drugs (Unknown)    Intolerances        LOS  1d    HPI  HPI:  69 yo male with hx HTN, IDDM, HLD, active smoker (+1PPD), CAD s/p PCI, A-Fib on Eliquis, HFrEF (30%) s/p AICD p/w expressive aphasia, right sided weakness and difficulty getting up from the ground since yesterday morning.  Last known normal was approximately 9 evening, pt is AAOx3, conversational and ambulates independently at baseline as per spouse. According to spouse, pt compliant with his medications. She denies any complaints of fevers, chest pain, sob, recent illness. History and ROS limited at this time 2/2 pt with speech difficulty, and spouse is poor historian.     In the ED,   T(F): 99.4   HR: 145 A-Fib w/ RVR w/ runs of SVT started on Amio gtt. Cardio called, agree with plan, will follow. AICD was interrogated per ED, consistent with A-Fib w/ RVR and SVT.    BP: 179/76  RR: 30  SpO2: 96% on room air  Labs showed WBC 22k w/ NP, Hgb 19, Cr 1.6 baseline 1.4, Lactate 6.3 pH wnl, troponin 0.05. UA negative.   Stroke code on arrival. Imaging revealed large (96 mL) volume of decreased cerebral blood flow within the left MCA   territory, concerning for ischemia. No core infarct detected. No acute intervention.  Being admitted to MICU for q1h neuro checks.    (03 Sep 2023 16:17)      INFECTIOUS DISEASE HISTORY:  ID consulted for antimicrobial recommendations given patient was septic on admission. Currently was evaluated this morning, he was not in any acute distress.     Prior hospital charts reviewed [Yes]  Primary team notes reviewed [Yes]  Other consultant notes reviewed [Yes]    REVIEW OF SYSTEMS:  CONSTITUTIONAL: No fever or chills  HEENT: No sore throat  RESPIRATORY: No cough, no shortness of breath  CARDIOVASCULAR: No chest pain or palpitations  GASTROINTESTINAL: No abdominal or epigastric pain  GENITOURINARY: No dysuria  NEUROLOGICAL: No headache/dizziness  MSK: No joint pain, erythema, or swelling; no back pain  SKIN: No itching, rashes  All other ROS negative except noted above    PAST MEDICAL & SURGICAL HISTORY:  CHF (congestive heart failure)      Diabetes      CAD (coronary artery disease)      Chronic obstructive pulmonary disease (COPD)      HTN (hypertension)      Stented coronary artery      Dyslipidemia      GERD (gastroesophageal reflux disease)      Afib      H/O heart artery stent      Heart valve replaced  aortic      History of Nissen fundoplication          SOCIAL HISTORY:  - No recent travel  - Smoker     FAMILY HISTORY: History of HTN and DM in parents.     ANTIMICROBIALS:  cefepime   IVPB 2000 every 12 hours  vancomycin  IVPB 1500 every 24 hours      ANTIMICROBIALS (past 90 days):  MEDICATIONS  (STANDING):  cefepime   IVPB   100 mL/Hr IV Intermittent (23 @ 06:29)    cefTRIAXone   IVPB   100 mL/Hr IV Intermittent (23 @ 14:50)    vancomycin  IVPB   300 mL/Hr IV Intermittent (23 @ 23:14)    vancomycin  IVPB   250 mL/Hr IV Intermittent (23 @ 13:45)        OTHER MEDS:   MEDICATIONS  (STANDING):  aMIOdarone Infusion 0.5 <Continuous>  aMIOdarone Infusion 1 <Continuous>  atorvastatin 80 at bedtime  clopidogrel Tablet 75 daily  fenofibrate Tablet 145 daily  furosemide    Tablet 40 daily  heparin  Infusion 1100 <Continuous>  metoprolol tartrate Injectable 5 every 6 hours PRN  sacubitril 24 mG/valsartan 26 mG 1 two times a day  zolpidem 5 at bedtime PRN      VITALS:  Vital Signs Last 24 Hrs  T(F): 97.6 (23 @ 03:00), Max: 100.6 (23 @ 17:16)    Vital Signs Last 24 Hrs  HR: 120 (23 @ 06:00) (104 - 145)  BP: 140/72 (23 @ 06:00) (98/52 - 179/76)  RR: 27 (23 @ 06:00)  SpO2: 98% (23 @ 06:00) (94% - 98%)  Wt(kg): --    EXAM:  GENERAL: NAD, lying in bed  HEAD: No head lesions  NECK: Supple, nontender to palpation; no JVD  CHEST/LUNG: Clear to auscultation bilaterally  HEART: S1 S2  ABDOMEN: Soft, nontender, nondistended  EXTREMITIES: No clubbing, cyanosis, or petal edema  NERVOUS SYSTEM: Alert and oriented to person. Aphasia and dysarthria noted.   MSK: No joint erythema, swelling or pain. RUE and RLE weakness noted.   SKIN: No rashes or lesions, no superficial thrombophlebitis    Labs:                        17.5   19.86 )-----------( 255      ( 03 Sep 2023 18:52 )             52.6         141  |  102  |  23<H>  ----------------------------<  68<L>  4.8   |  20  |  1.6<H>    Ca    10.4      03 Sep 2023 12:50    TPro  7.1  /  Alb  4.4  /  TBili  0.8  /  DBili  x   /  AST  30  /  ALT  33  /  AlkPhos  39        WBC Trend:  WBC Count: 19.86 (23 @ 18:52)  WBC Count: 22.31 (23 @ 12:50)      Auto Neutrophil #: 20.05 K/uL (23 @ 12:50)  Band Neutrophils %: 15.0 % (23 @ 12:50)  Auto Neutrophil #: 4.17 K/uL (22 @ 14:46)      Creatine Trend:  Creatinine: 1.6 ()      Liver Biochemical Testing Trend:  Alanine Aminotransferase (ALT/SGPT): 33 ()  Alanine Aminotransferase (ALT/SGPT): 48 *H* ()  Aspartate Aminotransferase (AST/SGOT): 30 (23 @ 12:50)  Aspartate Aminotransferase (AST/SGOT): 43 (22 @ 14:46)  Bilirubin Total: 0.8 ()  Bilirubin Total, Serum: 0.7 ()      Trend LDH      Auto Eosinophil %: 0.0 % (23 @ 12:50)      Urinalysis Basic - ( 03 Sep 2023 14:02 )    Color: Dark Yellow / Appearance: Clear / S.024 / pH: x  Gluc: x / Ketone: Trace mg/dL  / Bili: Negative / Urobili: 1.0 mg/dL   Blood: x / Protein: 300 mg/dL / Nitrite: Negative   Leuk Esterase: Negative / RBC: 1 /HPF / WBC 2 /HPF   Sq Epi: x / Non Sq Epi: 3 /HPF / Bacteria: Few /HPF        MICROBIOLOGY:    Male  Lactate, Blood: 2.0 ( @ 18:52)  Lactate, Blood: 2.9 ( @ 15:15)  Blood Gas Venous - Lactate: 6.30 ( @ 13:18)        INFLAMMATORY MARKERS      RADIOLOGY & ADDITIONAL TESTS:  I have personally reviewed the imagings.  CXR  < from: CT Head No Cont (23 @ 21:49) >  IMPRESSION:  No evidence of acute transcortical infarct, acute intracranial   hemorrhage, or mass effect.    < end of copied text >  < from: CT Brain Perfusion Maps Stroke (23 @ 12:58) >  IMPRESSION:  CT PERFUSION:  Large (96 mL) volume of decreased cerebral blood flow within the left MCA   territory, concerning for ischemia. No core infarct detected.    CTA HEAD/NECK:  Atherosclerotic plaques are seen in the bilateral carotid bulbs and   carotid siphons contributing to up to moderate stenosis in the right   carotid bulb.    < end of copied text >  < from: Xray Chest 1 View AP/PA (23 @ 16:08) >    Support devices: Left chest wall AICD    Cardiac/mediastinum/hilum: Unchanged    Lung parenchyma/Pleura: Bilateral opacities/edema. No pneumothorax.    Skeleton/soft tissues: Unchanged    Impression:    Bilateral opacities/edema.    < end of copied text >      CT      CARDIOLOGY TESTING

## 2023-09-04 NOTE — PROGRESS NOTE ADULT - ASSESSMENT
#Suspected left MCA ischemic stroke  -CT head shows large volume of decreased cerebral blood flow within the left MCA   territory, concerning for ischemia  -Plavix, Eliquis, Statin  - neuro f/u  - mri    #A-Fib with RVR  -cont Eliquis    #CAD s/p PCI   #HFrEF (30%) s/p AICD  - stable  - entresto and BB held to allow for permissive HTN

## 2023-09-04 NOTE — CONSULT NOTE ADULT - ASSESSMENT
ASSESSMENT  68y M admitted with Cerebral infarction      CHF (congestive heart failure)    Diabetes    CAD (coronary artery disease)    Chronic obstructive pulmonary disease (COPD)    HTN (hypertension)    Stented coronary artery    Dyslipidemia    GERD (gastroesophageal reflux disease)    Afib        IMPRESSION  #  #Severe Sepsis on admission  #Lactic acidosis  #Hyponatremia   #Obesity BMI (kg/m2): 33.2  #DM     RECOMMENDATIONS  This is an incomplete consult note. All final recommendations to follow after interview and examination of the patient. Please follow recommendations noted below.    If any questions, please send a message or call on [employer] Teams  Please continue to update ID with any pertinent new laboratory or radiographic findings.    Benigno Pond M.D  Infectious Diseases Attending  [facility]    ASSESSMENT  This is a 67 yo male with hx HTN, IDDM, HLD, active smoker (+1PPD), CAD s/p PCI, A-Fib on Eliquis, HFrEF (30%) s/p AICD p/w expressive aphasia, right sided weakness and difficulty getting up from the ground    IMPRESSION  #Acute Stroke- Being worked up by the neurology  #Sepsis on admission  #Lactic acidosis  #HTN, Afib, HFrEF with AICD  #Obesity BMI (kg/m2): 33.2  #DM   #Bilateral opacities noted on CXR- possible edema    RECOMMENDATIONS  -Follow up with [facility]. Follow up with procalcitonin.  -For now keep on IV vancomycin 1500mg Q 24 hours. Check vanc trough before 3rd dose.  -Continue with IV Cefepime 2 gram q 12 hours.   -Will de-escalate or stop based on further information and clinical progression.     If any questions, please send a message or call on [employer] Teams  Please continue to update ID with any pertinent new laboratory or radiographic findings.    Benigno Pond M.D  Infectious Diseases Attending  [facility]

## 2023-09-04 NOTE — OCCUPATIONAL THERAPY INITIAL EVALUATION ADULT - PREDICTED DURATION OF THERAPY (DAYS/WKS), OT EVAL
Patient alert and oriented x4. On RA, . Afib on tele. Plan for cardioversion today. Pt NPO. Consents signed. Resting comfortably in bed. Will continue to monitor. 1300:  Cardioversion completed. NSR on tele.   1400:  Per tele techs, pt back 1-2 weeks

## 2023-09-04 NOTE — PATIENT PROFILE ADULT - FALL HARM RISK - HARM RISK INTERVENTIONS
Assistance with ambulation/Assistance OOB with selected safe patient handling equipment/Communicate Risk of Fall with Harm to all staff/Discuss with provider need for PT consult/Monitor gait and stability/Provide patient with walking aids - walker, cane, crutches/Reinforce activity limits and safety measures with patient and family/Tailored Fall Risk Interventions/Use of alarms - bed, chair and/or voice tab/Visual Cue: Yellow wristband and red socks/Bed in lowest position, wheels locked, appropriate side rails in place/Call bell, personal items and telephone in reach/Instruct patient to call for assistance before getting out of bed or chair/Non-slip footwear when patient is out of bed/[place] to call system/Physically safe environment - no spills, clutter or unnecessary equipment/Purposeful Proactive Rounding/Room/bathroom lighting operational, light cord in reach

## 2023-09-04 NOTE — PROGRESS NOTE ADULT - SUBJECTIVE AND OBJECTIVE BOX
Son, who is primary historian, along with another son and wife that are poor historians at bedside. As per son, pt still not speaking normally. Noted that pt did have a few outbursts with laughing and shouting that son said is normal for his father.      T(F): , Max: 100 (09-04-23 @ 11:02)  HR: 113 (09-04-23 @ 20:00) (104 - 137)  BP: 157/74 (09-04-23 @ 20:00)  RR: 22 (09-04-23 @ 20:00)  SpO2: 97% (09-04-23 @ 20:00)  IN: 387.5 mL / OUT: 610 mL / NET: -222.5 mL    IN: 566.9 mL / OUT: 485 mL / NET: 81.9 mL      General: No apparent distress  Cardiovascular: S1, S2  Gastrointestinal: Soft, Non-tender, Non-distended  Respiratory: Good air entry bilaterally  Musculoskeletal: Moves all extremities  Lymphatic: No edema  Neurologic: expressive aphasia  Dermatologic: Skin dry  PT/INR - ( 03 Sep 2023 12:50 )   PT: 16.60 sec;   INR: 1.44 ratio         PTT - ( 04 Sep 2023 13:45 )  PTT:46.4 sec                        16.6   16.73 )-----------( 235      ( 04 Sep 2023 06:14 )             50.3     09-04    143  |  107  |  25<H>  ----------------------------<  34<LL>  4.6   |  22  |  1.5    Ca    9.1      04 Sep 2023 06:14  Mg     2.3     09-04    TPro  5.8<L>  /  Alb  3.9  /  TBili  0.7  /  DBili  x   /  AST  67<H>  /  ALT  35  /  AlkPhos  34  09-04

## 2023-09-05 LAB
ALBUMIN SERPL ELPH-MCNC: 3.8 G/DL — SIGNIFICANT CHANGE UP (ref 3.5–5.2)
ALP SERPL-CCNC: 34 U/L — SIGNIFICANT CHANGE UP (ref 30–115)
ALT FLD-CCNC: 37 U/L — SIGNIFICANT CHANGE UP (ref 0–41)
ANION GAP SERPL CALC-SCNC: 11 MMOL/L — SIGNIFICANT CHANGE UP (ref 7–14)
APTT BLD: 35.2 SEC — SIGNIFICANT CHANGE UP (ref 27–39.2)
APTT BLD: 60 SEC — HIGH (ref 27–39.2)
APTT BLD: 63.8 SEC — HIGH (ref 27–39.2)
AST SERPL-CCNC: 53 U/L — HIGH (ref 0–41)
BILIRUB DIRECT SERPL-MCNC: 0.4 MG/DL — HIGH (ref 0–0.3)
BILIRUB INDIRECT FLD-MCNC: 0.6 MG/DL — SIGNIFICANT CHANGE UP (ref 0.2–1.2)
BILIRUB SERPL-MCNC: 1 MG/DL — SIGNIFICANT CHANGE UP (ref 0.2–1.2)
BUN SERPL-MCNC: 24 MG/DL — HIGH (ref 10–20)
CALCIUM SERPL-MCNC: 9.2 MG/DL — SIGNIFICANT CHANGE UP (ref 8.4–10.5)
CHLORIDE SERPL-SCNC: 106 MMOL/L — SIGNIFICANT CHANGE UP (ref 98–110)
CO2 SERPL-SCNC: 23 MMOL/L — SIGNIFICANT CHANGE UP (ref 17–32)
CREAT SERPL-MCNC: 1.4 MG/DL — SIGNIFICANT CHANGE UP (ref 0.7–1.5)
EGFR: 55 ML/MIN/1.73M2 — LOW
GLUCOSE BLDC GLUCOMTR-MCNC: 140 MG/DL — HIGH (ref 70–99)
GLUCOSE BLDC GLUCOMTR-MCNC: 208 MG/DL — HIGH (ref 70–99)
GLUCOSE BLDC GLUCOMTR-MCNC: 219 MG/DL — HIGH (ref 70–99)
GLUCOSE BLDC GLUCOMTR-MCNC: 299 MG/DL — HIGH (ref 70–99)
GLUCOSE SERPL-MCNC: 128 MG/DL — HIGH (ref 70–99)
HCT VFR BLD CALC: 49.4 % — SIGNIFICANT CHANGE UP (ref 42–52)
HGB BLD-MCNC: 16.2 G/DL — SIGNIFICANT CHANGE UP (ref 14–18)
MCHC RBC-ENTMCNC: 29.8 PG — SIGNIFICANT CHANGE UP (ref 27–31)
MCHC RBC-ENTMCNC: 32.8 G/DL — SIGNIFICANT CHANGE UP (ref 32–37)
MCV RBC AUTO: 90.8 FL — SIGNIFICANT CHANGE UP (ref 80–94)
MRSA PCR RESULT.: NEGATIVE — SIGNIFICANT CHANGE UP
NRBC # BLD: 0 /100 WBCS — SIGNIFICANT CHANGE UP (ref 0–0)
NT-PROBNP SERPL-SCNC: 2240 PG/ML — HIGH (ref 0–300)
PLATELET # BLD AUTO: 211 K/UL — SIGNIFICANT CHANGE UP (ref 130–400)
PMV BLD: 11.9 FL — HIGH (ref 7.4–10.4)
POTASSIUM SERPL-MCNC: 4.4 MMOL/L — SIGNIFICANT CHANGE UP (ref 3.5–5)
POTASSIUM SERPL-SCNC: 4.4 MMOL/L — SIGNIFICANT CHANGE UP (ref 3.5–5)
PROCALCITONIN SERPL-MCNC: 0.2 NG/ML — HIGH (ref 0.02–0.1)
PROT SERPL-MCNC: 6.2 G/DL — SIGNIFICANT CHANGE UP (ref 6–8)
RBC # BLD: 5.44 M/UL — SIGNIFICANT CHANGE UP (ref 4.7–6.1)
RBC # FLD: 13.8 % — SIGNIFICANT CHANGE UP (ref 11.5–14.5)
SODIUM SERPL-SCNC: 140 MMOL/L — SIGNIFICANT CHANGE UP (ref 135–146)
WBC # BLD: 13.36 K/UL — HIGH (ref 4.8–10.8)
WBC # FLD AUTO: 13.36 K/UL — HIGH (ref 4.8–10.8)

## 2023-09-05 PROCEDURE — 99232 SBSQ HOSP IP/OBS MODERATE 35: CPT

## 2023-09-05 PROCEDURE — 99223 1ST HOSP IP/OBS HIGH 75: CPT

## 2023-09-05 PROCEDURE — 71045 X-RAY EXAM CHEST 1 VIEW: CPT | Mod: 26

## 2023-09-05 PROCEDURE — 93010 ELECTROCARDIOGRAM REPORT: CPT

## 2023-09-05 PROCEDURE — 99233 SBSQ HOSP IP/OBS HIGH 50: CPT

## 2023-09-05 RX ORDER — AMIODARONE HYDROCHLORIDE 400 MG/1
200 TABLET ORAL
Refills: 0 | Status: DISCONTINUED | OUTPATIENT
Start: 2023-09-05 | End: 2023-09-07

## 2023-09-05 RX ORDER — CHLORHEXIDINE GLUCONATE 213 G/1000ML
1 SOLUTION TOPICAL
Refills: 0 | Status: DISCONTINUED | OUTPATIENT
Start: 2023-09-05 | End: 2023-09-09

## 2023-09-05 RX ORDER — FUROSEMIDE 40 MG
20 TABLET ORAL ONCE
Refills: 0 | Status: COMPLETED | OUTPATIENT
Start: 2023-09-05 | End: 2023-09-05

## 2023-09-05 RX ORDER — LANOLIN ALCOHOL/MO/W.PET/CERES
5 CREAM (GRAM) TOPICAL AT BEDTIME
Refills: 0 | Status: DISCONTINUED | OUTPATIENT
Start: 2023-09-05 | End: 2023-09-09

## 2023-09-05 RX ORDER — METOPROLOL TARTRATE 50 MG
50 TABLET ORAL ONCE
Refills: 0 | Status: COMPLETED | OUTPATIENT
Start: 2023-09-05 | End: 2023-09-05

## 2023-09-05 RX ADMIN — Medication 100 MILLIGRAM(S): at 05:30

## 2023-09-05 RX ADMIN — Medication 50 MILLIGRAM(S): at 17:18

## 2023-09-05 RX ADMIN — Medication 1 PATCH: at 19:54

## 2023-09-05 RX ADMIN — Medication 3: at 11:41

## 2023-09-05 RX ADMIN — CEFEPIME 100 MILLIGRAM(S): 1 INJECTION, POWDER, FOR SOLUTION INTRAMUSCULAR; INTRAVENOUS at 05:30

## 2023-09-05 RX ADMIN — SACUBITRIL AND VALSARTAN 1 TABLET(S): 24; 26 TABLET, FILM COATED ORAL at 05:29

## 2023-09-05 RX ADMIN — Medication 1 PATCH: at 17:18

## 2023-09-05 RX ADMIN — Medication 20 MILLIGRAM(S): at 11:04

## 2023-09-05 RX ADMIN — CLOPIDOGREL BISULFATE 75 MILLIGRAM(S): 75 TABLET, FILM COATED ORAL at 11:07

## 2023-09-05 RX ADMIN — HEPARIN SODIUM 16 UNIT(S)/HR: 5000 INJECTION INTRAVENOUS; SUBCUTANEOUS at 21:25

## 2023-09-05 RX ADMIN — ATORVASTATIN CALCIUM 80 MILLIGRAM(S): 80 TABLET, FILM COATED ORAL at 21:24

## 2023-09-05 RX ADMIN — Medication 40 MILLIGRAM(S): at 05:30

## 2023-09-05 RX ADMIN — AMIODARONE HYDROCHLORIDE 200 MILLIGRAM(S): 400 TABLET ORAL at 17:09

## 2023-09-05 RX ADMIN — Medication 2: at 17:07

## 2023-09-05 RX ADMIN — Medication 1 PATCH: at 11:03

## 2023-09-05 RX ADMIN — HEPARIN SODIUM 16 UNIT(S)/HR: 5000 INJECTION INTRAVENOUS; SUBCUTANEOUS at 12:12

## 2023-09-05 RX ADMIN — Medication 5 MILLIGRAM(S): at 22:15

## 2023-09-05 RX ADMIN — Medication 145 MILLIGRAM(S): at 11:03

## 2023-09-05 RX ADMIN — Medication 1 PATCH: at 11:08

## 2023-09-05 NOTE — PROGRESS NOTE ADULT - SUBJECTIVE AND OBJECTIVE BOX
INTERVAL EVENTS: Patient seen in afternoon. He is feeling well. Denies complaints of CP, SOB, palpitations.   AF with somewhat controlled HR and is currently being transitioned from Amio IV to PO.     PAST MEDICAL & SURGICAL HISTORY:  CHF (congestive heart failure)    Diabetes    CAD (coronary artery disease)    Chronic obstructive pulmonary disease (COPD)    HTN (hypertension)    Stented coronary artery    Dyslipidemia    GERD (gastroesophageal reflux disease)    Afib    H/O heart artery stent    Heart valve replaced  aortic    History of Nissen fundoplication        MEDICATIONS  (STANDING):  aMIOdarone    Tablet 200 milliGRAM(s) Oral two times a day  atorvastatin 80 milliGRAM(s) Oral at bedtime  cefepime   IVPB 2000 milliGRAM(s) IV Intermittent every 12 hours  chlorhexidine 2% Cloths 1 Application(s) Topical <User Schedule>  clopidogrel Tablet 75 milliGRAM(s) Oral daily  dextrose 5%. 1000 milliLiter(s) (50 mL/Hr) IV Continuous <Continuous>  dextrose 5%. 1000 milliLiter(s) (100 mL/Hr) IV Continuous <Continuous>  dextrose 50% Injectable 25 Gram(s) IV Push once  dextrose 50% Injectable 12.5 Gram(s) IV Push once  dextrose 50% Injectable 25 Gram(s) IV Push once  fenofibrate Tablet 145 milliGRAM(s) Oral daily  furosemide    Tablet 40 milliGRAM(s) Oral daily  glucagon  Injectable 1 milliGRAM(s) IntraMuscular once  heparin  Infusion 1400 Unit(s)/Hr (16 mL/Hr) IV Continuous <Continuous>  insulin lispro (ADMELOG) corrective regimen sliding scale   SubCutaneous three times a day before meals  metoprolol tartrate 100 milliGRAM(s) Oral two times a day  nicotine - 21 mG/24Hr(s) Patch 1 Patch Transdermal daily  sacubitril 24 mG/valsartan 26 mG 1 Tablet(s) Oral two times a day  vancomycin  IVPB 1500 milliGRAM(s) IV Intermittent every 24 hours    MEDICATIONS  (PRN):  dextrose Oral Gel 15 Gram(s) Oral once PRN Blood Glucose LESS THAN 70 milliGRAM(s)/deciliter  zolpidem 5 milliGRAM(s) Oral at bedtime PRN Insomnia      Vital Signs Last 24 Hrs  T(C): 36.3 (05 Sep 2023 07:01), Max: 36.9 (04 Sep 2023 23:00)  T(F): 97.4 (05 Sep 2023 07:01), Max: 98.4 (04 Sep 2023 23:00)  HR: 102 (05 Sep 2023 09:11) (102 - 135)  BP: 126/74 (05 Sep 2023 09:11) (110/80 - 205/152)  BP(mean): 95 (05 Sep 2023 09:11) (91 - 176)  RR: 26 (05 Sep 2023 09:11) (19 - 39)  SpO2: 91% (05 Sep 2023 09:11) (91% - 97%)    Parameters below as of 05 Sep 2023 09:11  Patient On (Oxygen Delivery Method): nasal cannula         PHYSICAL EXAM:  GEN: Awake, alert. NAD.   HEENT: NCAT, PERRL, EOMI. Mucosa moist. No JVD.  RESP: CTA b/l  CV: Irregularly irregular. Normal S1/S2. No m/r/g.  ABD: Soft. NT/ND. BS+  EXT: Warm. No edema, clubbing, or cyanosis.   NEURO: AAOx3. No focal deficits.     LABS:                        16.2   13.36 )-----------( 211      ( 05 Sep 2023 05:44 )             49.4     09-05    140  |  106  |  24<H>  ----------------------------<  128<H>  4.4   |  23  |  1.4    Ca    9.2      05 Sep 2023 05:44  Mg     2.3     09-04    TPro  6.2  /  Alb  3.8  /  TBili  1.0  /  DBili  0.4<H>  /  AST  53<H>  /  ALT  37  /  AlkPhos  34  09-05    CARDIAC MARKERS ( 04 Sep 2023 06:14 )  x     / 0.07 ng/mL / x     / x     / x      CARDIAC MARKERS ( 03 Sep 2023 18:52 )  x     / 0.07 ng/mL / x     / x     / x          PTT - ( 05 Sep 2023 05:44 )  PTT:35.2 sec  Urinalysis Basic - ( 05 Sep 2023 05:44 )    Color: x / Appearance: x / SG: x / pH: x  Gluc: 128 mg/dL / Ketone: x  / Bili: x / Urobili: x   Blood: x / Protein: x / Nitrite: x   Leuk Esterase: x / RBC: x / WBC x   Sq Epi: x / Non Sq Epi: x / Bacteria: x      I&O's Summary    04 Sep 2023 07:01  -  05 Sep 2023 07:00  --------------------------------------------------------  IN: 1552.9 mL / OUT: 1195 mL / NET: 357.9 mL    05 Sep 2023 07:01  -  05 Sep 2023 13:59  --------------------------------------------------------  IN: 492 mL / OUT: 1500 mL / NET: -1008 mL      BNP  RADIOLOGY & ADDITIONAL STUDIES:    TELEMETRY: AF with -120s.     EKG: < from: 12 Lead ECG (09.05.23 @ 07:35) >  Ventricular-paced rhythm  Biventricular pacemaker detected  Abnormal ECG    < end of copied text >    Echo: < from: TTE Echo Complete w/o Contrast w/ Doppler (09.04.23 @ 09:07) >  Summary:   1. LV Ejection Fraction by Beck's Method with a biplane EF of 38 %.   2. Mild left ventricular hypertrophy.   3. Mildly enlarged left atrium.   4. There is no evidence of pericardial effusion.   5. Mild mitral annular calcification.   6. Bioprosthesis in the aortic position.    < end of copied text >

## 2023-09-05 NOTE — PROGRESS NOTE ADULT - ASSESSMENT
This is a 67 yo male with hx HTN, IDDM, HLD, active smoker (+1PPD), CAD s/p PCI, A-Fib on Eliquis, HFrEF (30%) s/p AICD p/w expressive aphasia, right sided weakness and difficulty getting up from the ground    IMPRESSION  #Acute Stroke- Being worked up by the neurology  #Sepsis on admission  #Lactic acidosis  #HTN, Afib, HFrEF with AICD  #Obesity BMI (kg/m2): 33.2  #DM   #Bilateral opacities noted on CXR- possible edema    RECOMMENDATIONS  -Follow up with [facility].  -For now keep on IV vancomycin 1500mg Q 24 hours. Check vanc trough before 3rd dose.  -Continue with IV Cefepime 2 gram q 12 hours.   -Send MRSA nares.   -Maintain aspiration precautions.   -Will de-escalate or stop based on further information and clinical progression.     If any questions, please send a message or call on [employer] Teams  Please continue to update ID with any pertinent new laboratory or radiographic findings.    Benigno Pond M.D  Infectious Diseases Attending  [facility]    This is a 67 yo male with hx HTN, IDDM, HLD, active smoker (+1PPD), CAD s/p PCI, A-Fib on Eliquis, HFrEF (30%) s/p AICD p/w expressive aphasia, right sided weakness and difficulty getting up from the ground    IMPRESSION  #Acute Stroke- Being worked up by the neurology  #Sepsis on admission  #Lactic acidosis  #HTN, Afib, HFrEF with AICD  #Obesity BMI (kg/m2): 33.2  #DM   #Bilateral opacities noted on CXR- possible edema    RECOMMENDATIONS  -Follow up with [facility].  -Reasonable to stop the antibiotics and monitor.   -Maintain aspiration precautions.     If any questions, please send a message or call on "[employer] Teams  Please continue to update ID with any pertinent new laboratory or radiographic findings.    Benigno Pond M.D  Infectious Diseases Attending  [facility]

## 2023-09-05 NOTE — PROGRESS NOTE ADULT - SUBJECTIVE AND OBJECTIVE BOX
TOM Mcdonald [id number]  Neurology Follow up note    Name  MONIQUE LYONS    HPI:  67 yo male with hx HTN, IDDM, HLD, active smoker (+1PPD), CAD s/p PCI, A-Fib on Eliquis, HFrEF (30%) s/p AICD p/w expressive aphasia, right sided weakness and difficulty getting up from the ground since yesterday morning.  Last known normal was approximately 9/1 evening, pt is AAOx3, conversational and ambulates independently at baseline as per spouse. According to spouse, pt compliant with his medications. She denies any complaints of fevers, chest pain, sob, recent illness. History and ROS limited at this time 2/2 pt with speech difficulty, and spouse is poor historian.     In the ED,   T(F): 99.4   HR: 145 A-Fib w/ RVR w/ runs of SVT started on Amio gtt. Cardio called, agree with plan, will follow. AICD was interrogated per ED, consistent with A-Fib w/ RVR and SVT.    BP: 179/76  RR: 30  SpO2: 96% on room air  Labs showed WBC 22k w/ NP, Hgb 19, Cr 1.6 baseline 1.4, Lactate 6.3 pH wnl, troponin 0.05. UA negative.   Stroke code on arrival. Imaging revealed large (96 mL) volume of decreased cerebral blood flow within the left MCA   territory, concerning for ischemia. No core infarct detected. No acute intervention.  Being admitted to MICU for q1h neuro checks.    (03 Sep 2023 16:17)      Interval History - Pt seen at bedside with Dr. Ching, patient awake and alert. Pt has no new complaints, states feels much improved since yesterday.  Pt states no numbness to right side, and improving motor strength. Pt states feeling some weakness to left upper extremity but denies any significant change.  Pt denies any visual complaint, no headaches, and no dizziness. Pt had speech evaluation in am, improved speech and still some difficulty swallowing.         Vital Signs Last 24 Hrs  T(C): 36.3 (05 Sep 2023 07:01), Max: 36.9 (04 Sep 2023 23:00)  T(F): 97.4 (05 Sep 2023 07:01), Max: 98.4 (04 Sep 2023 23:00)  HR: 102 (05 Sep 2023 09:11) (102 - 135)  BP: 126/74 (05 Sep 2023 09:11) (110/80 - 205/152)  BP(mean): 95 (05 Sep 2023 09:11) (91 - 176)  RR: 26 (05 Sep 2023 09:11) (19 - 39)  SpO2: 91% (05 Sep 2023 09:11) (91% - 97%)    Parameters below as of 05 Sep 2023 09:11  Patient On (Oxygen Delivery Method): nasal cannula      ICU Vital Signs Last 24 Hrs  T(C): 36.3 (05 Sep 2023 07:01), Max: 36.9 (04 Sep 2023 23:00)  T(F): 97.4 (05 Sep 2023 07:01), Max: 98.4 (04 Sep 2023 23:00)  HR: 102 (05 Sep 2023 09:11) (102 - 135)  BP: 126/74 (05 Sep 2023 09:11) (110/80 - 205/152)  BP(mean): 95 (05 Sep 2023 09:11) (91 - 176)  ABP: --  ABP(mean): --  RR: 26 (05 Sep 2023 09:11) (19 - 39)  SpO2: 91% (05 Sep 2023 09:11) (91% - 97%)    O2 Parameters below as of 05 Sep 2023 09:11  Patient On (Oxygen Delivery Method): nasal cannula    Neurological Exam:     Mental status: Awake, alert and Oriented to time/place/person; Good eye contact ; follow simple commands.  Recent and remote memory intact.  Naming, repetition and comprehension intact.  Attention/concentration intact.  Patient has Denture-less Dysarthria (1) , no aphasia.  Fund of knowledge appropriate.   Cranial nerves: pupils equally round and reactive to light, visual fields full, no nystagmus, extraocular muscles intact, V1 through V3 intact bilaterally and symmetric, face symmetric, hearing intact to finger rub, palate elevation symmetric, tongue was midline, sternocleidomastoid/shoulder shrug strength bilaterally 5/5.    Motor:  Normal bulk and tone, strength 5/5 in bilateral upper and lower extremities.   strength 5/5.   Sensation: Intact to light touch, proprioception.  No neglect.   Coordination: No dysmetria   NIHSS 1      Medications  aMIOdarone    Tablet 200 milliGRAM(s) Oral two times a day  atorvastatin 80 milliGRAM(s) Oral at bedtime  cefepime   IVPB 2000 milliGRAM(s) IV Intermittent every 12 hours  chlorhexidine 2% Cloths 1 Application(s) Topical <User Schedule>  clopidogrel Tablet 75 milliGRAM(s) Oral daily  dextrose 5%. 1000 milliLiter(s) IV Continuous <Continuous>  dextrose 5%. 1000 milliLiter(s) IV Continuous <Continuous>  dextrose 50% Injectable 25 Gram(s) IV Push once  dextrose 50% Injectable 25 Gram(s) IV Push once  dextrose 50% Injectable 12.5 Gram(s) IV Push once  dextrose Oral Gel 15 Gram(s) Oral once PRN  fenofibrate Tablet 145 milliGRAM(s) Oral daily  furosemide    Tablet 40 milliGRAM(s) Oral daily  glucagon  Injectable 1 milliGRAM(s) IntraMuscular once  heparin  Infusion 1400 Unit(s)/Hr IV Continuous <Continuous>  insulin lispro (ADMELOG) corrective regimen sliding scale   SubCutaneous three times a day before meals  metoprolol tartrate 100 milliGRAM(s) Oral two times a day  nicotine - 21 mG/24Hr(s) Patch 1 Patch Transdermal daily  sacubitril 24 mG/valsartan 26 mG 1 Tablet(s) Oral two times a day  vancomycin  IVPB 1500 milliGRAM(s) IV Intermittent every 24 hours  zolpidem 5 milliGRAM(s) Oral at bedtime PRN      Lab                        16.2   13.36 )-----------( 211      ( 05 Sep 2023 05:44 )             49.4     09-05    140  |  106  |  24<H>  ----------------------------<  128<H>  4.4   |  23  |  1.4    Ca    9.2      05 Sep 2023 05:44  Mg     2.3     09-04    TPro  6.2  /  Alb  3.8  /  TBili  1.0  /  DBili  0.4<H>  /  AST  53<H>  /  ALT  37  /  AlkPhos  34  09-05    CAPILLARY BLOOD GLUCOSE      POCT Blood Glucose.: 140 mg/dL (05 Sep 2023 07:52)  POCT Blood Glucose.: 241 mg/dL (04 Sep 2023 23:40)  POCT Blood Glucose.: 169 mg/dL (04 Sep 2023 17:47)  POCT Blood Glucose.: 161 mg/dL (04 Sep 2023 13:19)  POCT Blood Glucose.: 82 mg/dL (04 Sep 2023 11:36)    LIVER FUNCTIONS - ( 05 Sep 2023 05:44 )  Alb: 3.8 g/dL / Pro: 6.2 g/dL / ALK PHOS: 34 U/L / ALT: 37 U/L / AST: 53 U/L / GGT: x           PT/INR - ( 03 Sep 2023 12:50 )   PT: 16.60 sec;   INR: 1.44 ratio         PTT - ( 05 Sep 2023 05:44 )  PTT:35.2 sec        Assessment- 67 yo male with hx HTN, IDDM, HLD, active smoker (+1PPD), CAD s/p PCI, A-Fib on Eliquis, HFrEF (30%) s/p AICD p/w expressive aphasia, right sided weakness and difficulty getting up from the ground. Last known normal was approximately 9/1 evening, pt is AAOx3, conversational and ambulates independently at baseline Stroke code called in ED, CTH neg, perfusion showed L sided perfusion defect, no LVO on angiogram, moderate stenosis of R carotid bulb. On exam today patient is aphasic with R sided drift. Rapid afib on monitor.  9/4 NIHSS 7, currently NIHSS 1.  Stroke / Afib    Plan:    - routine EEG no seizure activity  - MR brain no contrast ( need cardiology clearance due to AICD/BVPPM)  - if AICD /BVPPM  NON-compatible, obtain repeat CTH no contrast.  - Continue heparin drip Maintain Stroke protocol PTT<65  - Continue Plavix 75mg daily for underlying CADhx  - Downgrade to q8h neuro checks  - -180  - management of afib per ICU/cardiology  - continue medicine management   - pt/ot/pmr/s&s  - neuro team will follow.       TOM Mcdonald 1933  Neurology Follow up note    Name  MONIQUE LYONS    HPI:  67 yo male with hx HTN, IDDM, HLD, active smoker (+1PPD), CAD s/p PCI, A-Fib on Eliquis, HFrEF (30%) s/p AICD p/w expressive aphasia, right sided weakness and difficulty getting up from the ground since yesterday morning.  Last known normal was approximately 9/1 evening, pt is AAOx3, conversational and ambulates independently at baseline as per spouse. According to spouse, pt compliant with his medications. She denies any complaints of fevers, chest pain, sob, recent illness. History and ROS limited at this time 2/2 pt with speech difficulty, and spouse is poor historian.     In the ED,   T(F): 99.4   HR: 145 A-Fib w/ RVR w/ runs of SVT started on Amio gtt. Cardio called, agree with plan, will follow. AICD was interrogated per ED, consistent with A-Fib w/ RVR and SVT.    BP: 179/76  RR: 30  SpO2: 96% on room air  Labs showed WBC 22k w/ NP, Hgb 19, Cr 1.6 baseline 1.4, Lactate 6.3 pH wnl, troponin 0.05. UA negative.   Stroke code on arrival. Imaging revealed large (96 mL) volume of decreased cerebral blood flow within the left MCA   territory, concerning for ischemia. No core infarct detected. No acute intervention.  Being admitted to MICU for q1h neuro checks.    (03 Sep 2023 16:17)      Interval History - Pt seen at bedside with Dr. Ching, patient awake and alert. Pt has no new complaints, states feels much improved since yesterday.  Pt states no numbness to right side, and improving motor strength. Pt states feeling some weakness to left upper extremity but denies any significant change.  Pt denies any visual complaint, no headaches, and no dizziness. Pt had speech evaluation in am, improved speech and still some difficulty swallowing.         Vital Signs Last 24 Hrs  T(C): 36.3 (05 Sep 2023 07:01), Max: 36.9 (04 Sep 2023 23:00)  T(F): 97.4 (05 Sep 2023 07:01), Max: 98.4 (04 Sep 2023 23:00)  HR: 102 (05 Sep 2023 09:11) (102 - 135)  BP: 126/74 (05 Sep 2023 09:11) (110/80 - 205/152)  BP(mean): 95 (05 Sep 2023 09:11) (91 - 176)  RR: 26 (05 Sep 2023 09:11) (19 - 39)  SpO2: 91% (05 Sep 2023 09:11) (91% - 97%)    Parameters below as of 05 Sep 2023 09:11  Patient On (Oxygen Delivery Method): nasal cannula      ICU Vital Signs Last 24 Hrs  T(C): 36.3 (05 Sep 2023 07:01), Max: 36.9 (04 Sep 2023 23:00)  T(F): 97.4 (05 Sep 2023 07:01), Max: 98.4 (04 Sep 2023 23:00)  HR: 102 (05 Sep 2023 09:11) (102 - 135)  BP: 126/74 (05 Sep 2023 09:11) (110/80 - 205/152)  BP(mean): 95 (05 Sep 2023 09:11) (91 - 176)  ABP: --  ABP(mean): --  RR: 26 (05 Sep 2023 09:11) (19 - 39)  SpO2: 91% (05 Sep 2023 09:11) (91% - 97%)    O2 Parameters below as of 05 Sep 2023 09:11  Patient On (Oxygen Delivery Method): nasal cannula    Neurological Exam:     Mental status: Awake, alert and Oriented to time/place/person; Good eye contact ; follow simple commands.  Recent and remote memory intact.  Naming, repetition and comprehension intact.  Attention/concentration intact.  Patient has Denture-less Dysarthria (1) , no aphasia.  Fund of knowledge appropriate.   Cranial nerves: pupils equally round and reactive to light, visual fields full, no nystagmus, extraocular muscles intact, V1 through V3 intact bilaterally and symmetric, face symmetric, hearing intact to finger rub, palate elevation symmetric, tongue was midline, sternocleidomastoid/shoulder shrug strength bilaterally 5/5.    Motor:  Normal bulk and tone, strength 5/5 in bilateral upper and lower extremities.   strength 5/5.   Sensation: Intact to light touch, proprioception.  No neglect.   Coordination: No dysmetria   NIHSS 1      Medications  aMIOdarone    Tablet 200 milliGRAM(s) Oral two times a day  atorvastatin 80 milliGRAM(s) Oral at bedtime  cefepime   IVPB 2000 milliGRAM(s) IV Intermittent every 12 hours  chlorhexidine 2% Cloths 1 Application(s) Topical <User Schedule>  clopidogrel Tablet 75 milliGRAM(s) Oral daily  dextrose 5%. 1000 milliLiter(s) IV Continuous <Continuous>  dextrose 5%. 1000 milliLiter(s) IV Continuous <Continuous>  dextrose 50% Injectable 25 Gram(s) IV Push once  dextrose 50% Injectable 25 Gram(s) IV Push once  dextrose 50% Injectable 12.5 Gram(s) IV Push once  dextrose Oral Gel 15 Gram(s) Oral once PRN  fenofibrate Tablet 145 milliGRAM(s) Oral daily  furosemide    Tablet 40 milliGRAM(s) Oral daily  glucagon  Injectable 1 milliGRAM(s) IntraMuscular once  heparin  Infusion 1400 Unit(s)/Hr IV Continuous <Continuous>  insulin lispro (ADMELOG) corrective regimen sliding scale   SubCutaneous three times a day before meals  metoprolol tartrate 100 milliGRAM(s) Oral two times a day  nicotine - 21 mG/24Hr(s) Patch 1 Patch Transdermal daily  sacubitril 24 mG/valsartan 26 mG 1 Tablet(s) Oral two times a day  vancomycin  IVPB 1500 milliGRAM(s) IV Intermittent every 24 hours  zolpidem 5 milliGRAM(s) Oral at bedtime PRN      Lab                        16.2   13.36 )-----------( 211      ( 05 Sep 2023 05:44 )             49.4     09-05    140  |  106  |  24<H>  ----------------------------<  128<H>  4.4   |  23  |  1.4    Ca    9.2      05 Sep 2023 05:44  Mg     2.3     09-04    TPro  6.2  /  Alb  3.8  /  TBili  1.0  /  DBili  0.4<H>  /  AST  53<H>  /  ALT  37  /  AlkPhos  34  09-05    CAPILLARY BLOOD GLUCOSE      POCT Blood Glucose.: 140 mg/dL (05 Sep 2023 07:52)  POCT Blood Glucose.: 241 mg/dL (04 Sep 2023 23:40)  POCT Blood Glucose.: 169 mg/dL (04 Sep 2023 17:47)  POCT Blood Glucose.: 161 mg/dL (04 Sep 2023 13:19)  POCT Blood Glucose.: 82 mg/dL (04 Sep 2023 11:36)    LIVER FUNCTIONS - ( 05 Sep 2023 05:44 )  Alb: 3.8 g/dL / Pro: 6.2 g/dL / ALK PHOS: 34 U/L / ALT: 37 U/L / AST: 53 U/L / GGT: x           PT/INR - ( 03 Sep 2023 12:50 )   PT: 16.60 sec;   INR: 1.44 ratio         PTT - ( 05 Sep 2023 05:44 )  PTT:35.2 sec

## 2023-09-05 NOTE — SWALLOW BEDSIDE ASSESSMENT ADULT - ASR SWALLOW RECOMMEND DIAG
Consider instrumental assessment if s/s aspiration do not resolve.
Consider instrumental assessment if s/s aspiration do not resolve.

## 2023-09-05 NOTE — SWALLOW BEDSIDE ASSESSMENT ADULT - PHARYNGEAL PHASE
Cough post oral intake/Delayed cough post oral intake/Complaints of pharyngeal stasis Within functional limits

## 2023-09-05 NOTE — CONSULT NOTE ADULT - SUBJECTIVE AND OBJECTIVE BOX
Patient is a 68y old  Male who presents with a chief complaint of     HPI:  69 yo male with hx HTN, IDDM, HLD, active smoker (+1PPD), CAD s/p PCI, A-Fib on Eliquis, HFrEF (30%) s/p AICD p/w expressive aphasia, right sided weakness and difficulty getting up from the ground since yesterday morning.  Last known normal was approximately 9/1 evening, pt is AAOx3, conversational and ambulates independently at baseline as per spouse. According to spouse, pt compliant with his medications. She denies any complaints of fevers, chest pain, sob, recent illness. History and ROS limited at this time 2/2 pt with speech difficulty, and spouse is poor historian.         PAST MEDICAL & SURGICAL HISTORY:  CHF (congestive heart failure)      Diabetes      CAD (coronary artery disease)      Chronic obstructive pulmonary disease (COPD)      HTN (hypertension)      Stented coronary artery      Dyslipidemia      GERD (gastroesophageal reflux disease)      Afib      H/O heart artery stent      Heart valve replaced  aortic      History of Nissen fundoplication        Allergies    sulfa drugs (Unknown)    Intolerances      Family history : no cardiovscular family history   Home Medications:  atorvastatin 40 mg oral tablet: 1 tab(s) orally once a day (at bedtime) (05 Feb 2020 17:41)  clopidogrel 75 mg oral tablet: 1 tab(s) orally once a day (05 Feb 2020 17:41)  Entresto 24 mg-26 mg oral tablet: 1 tab(s) orally 2 times a day (05 Feb 2020 17:41)  ezetimibe 10 mg oral tablet: 1 orally once a day (03 Sep 2023 16:41)  fenofibrate 145 mg oral tablet: 1 orally once a day (03 Sep 2023 16:41)  furosemide 40 mg oral tablet: 1 orally once a day (03 Sep 2023 16:41)  Lunesta 1 mg oral tablet: 1 orally once a day (at bedtime) (03 Sep 2023 16:41)  metoprolol succinate 200 mg oral capsule, extended release: 1 cap(s) orally once a day (05 Feb 2020 17:41)  NovoLOG 100 units/mL injectable solution: 8 unit(s) injectable once a day (05 Feb 2020 17:41)  OMEGA-3 ETHYL ESTERS 1 GM CAP: 1 cap(s) orally once a day (05 Feb 2020 17:41)  Tresiba 100 units/mL subcutaneous solution: 150 unit(s) subcutaneous once a day (05 Feb 2020 17:41)    Occupation:  Alochol: Denied  Smoking: Denied  Drug Use: Denied  Marital Status:         ROS: as in HPI; All other systems reviewed are negative    ICU Vital Signs Last 24 Hrs  T(C): 36.3 (05 Sep 2023 07:01), Max: 37.8 (04 Sep 2023 11:02)  T(F): 97.4 (05 Sep 2023 07:01), Max: 100 (04 Sep 2023 11:02)  HR: 124 (05 Sep 2023 07:01) (105 - 135)  BP: 110/80 (05 Sep 2023 07:01) (110/80 - 205/152)  BP(mean): 91 (05 Sep 2023 07:01) (91 - 176)  ABP: --  ABP(mean): --  RR: 20 (05 Sep 2023 07:01) (19 - 39)  SpO2: 95% (05 Sep 2023 07:01) (94% - 99%)    O2 Parameters below as of 05 Sep 2023 07:01  Patient On (Oxygen Delivery Method): nasal cannula              Physical Examination:    General: No acute distress.       HEENT: Pupils equal, reactive to light.  Symmetric.    PULM: Clear to auscultation bilaterally, no significant sputum production    CVS: Regular rate and rhythm, no murmurs, rubs, or gallops    ABD: Soft, nondistended, nontender, normoactive bowel sounds, no masses    EXT: No edema, nontender, no clubbing     SKIN: Warm and well perfused, no rashes noted.    Musculoskeletal : move all extremity     Lymphatic system: no Palpable node               I&O's Detail    04 Sep 2023 07:01  -  05 Sep 2023 07:00  --------------------------------------------------------  IN:    Amiodarone: 116.9 mL    Amiodarone: 120 mL    Heparin: 184 mL    Heparin: 112 mL    IV PiggyBack: 500 mL    IV PiggyBack: 100 mL    Oral Fluid: 420 mL  Total IN: 1552.9 mL    OUT:    Indwelling Catheter - Urethral (mL): 1195 mL  Total OUT: 1195 mL    Total NET: 357.9 mL      05 Sep 2023 07:01  -  05 Sep 2023 09:08  --------------------------------------------------------  IN:    Oral Fluid: 210 mL  Total IN: 210 mL    OUT:    Indwelling Catheter - Urethral (mL): 200 mL  Total OUT: 200 mL    Total NET: 10 mL            LABS:                        16.2   13.36 )-----------( 211      ( 05 Sep 2023 05:44 )             49.4     05 Sep 2023 05:44    140    |  106    |  24     ----------------------------<  128    4.4     |  23     |  1.4      Ca    9.2        05 Sep 2023 05:44  Mg     2.3       04 Sep 2023 06:14    TPro  6.2    /  Alb  3.8    /  TBili  1.0    /  DBili  0.4    /  AST  53     /  ALT  37     /  AlkPhos  34     05 Sep 2023 05:44  Amylase x     lipase x          CARDIAC MARKERS ( 04 Sep 2023 06:14 )  x     / 0.07 ng/mL / x     / x     / x      CARDIAC MARKERS ( 03 Sep 2023 18:52 )  x     / 0.07 ng/mL / x     / x     / x      CARDIAC MARKERS ( 03 Sep 2023 13:18 )  x     / x     / 454 U/L / x     / x      CARDIAC MARKERS ( 03 Sep 2023 12:50 )  x     / 0.05 ng/mL / x     / x     / x          CAPILLARY BLOOD GLUCOSE      POCT Blood Glucose.: 140 mg/dL (05 Sep 2023 07:52)    PT/INR - ( 03 Sep 2023 12:50 )   PT: 16.60 sec;   INR: 1.44 ratio         PTT - ( 05 Sep 2023 05:44 )  PTT:35.2 sec  Urinalysis Basic - ( 05 Sep 2023 05:44 )    Color: x / Appearance: x / SG: x / pH: x  Gluc: 128 mg/dL / Ketone: x  / Bili: x / Urobili: x   Blood: x / Protein: x / Nitrite: x   Leuk Esterase: x / RBC: x / WBC x   Sq Epi: x / Non Sq Epi: x / Bacteria: x      Culture    Culture - Blood (collected 03 Sep 2023 12:50)  Source: .Blood Blood  Preliminary Report (05 Sep 2023 01:02):    No growth at 24 hours    Culture - Blood (collected 03 Sep 2023 12:50)  Source: .Blood Blood  Preliminary Report (05 Sep 2023 01:02):    No growth at 24 hours      Lactate, Blood: 2.0 mmol/L (09-03-23 @ 18:52)  Lactate, Blood: 2.9 mmol/L (09-03-23 @ 15:15)      MEDICATIONS  (STANDING):  aMIOdarone    Tablet 200 milliGRAM(s) Oral two times a day  atorvastatin 80 milliGRAM(s) Oral at bedtime  cefepime   IVPB 2000 milliGRAM(s) IV Intermittent every 12 hours  clopidogrel Tablet 75 milliGRAM(s) Oral daily  dextrose 5%. 1000 milliLiter(s) (50 mL/Hr) IV Continuous <Continuous>  dextrose 5%. 1000 milliLiter(s) (100 mL/Hr) IV Continuous <Continuous>  dextrose 50% Injectable 25 Gram(s) IV Push once  dextrose 50% Injectable 12.5 Gram(s) IV Push once  dextrose 50% Injectable 25 Gram(s) IV Push once  fenofibrate Tablet 145 milliGRAM(s) Oral daily  furosemide    Tablet 40 milliGRAM(s) Oral daily  glucagon  Injectable 1 milliGRAM(s) IntraMuscular once  heparin  Infusion 1400 Unit(s)/Hr (14 mL/Hr) IV Continuous <Continuous>  insulin lispro (ADMELOG) corrective regimen sliding scale   SubCutaneous three times a day before meals  metoprolol tartrate 100 milliGRAM(s) Oral two times a day  nicotine - 21 mG/24Hr(s) Patch 1 Patch Transdermal daily  sacubitril 24 mG/valsartan 26 mG 1 Tablet(s) Oral two times a day  vancomycin  IVPB 1500 milliGRAM(s) IV Intermittent every 24 hours    MEDICATIONS  (PRN):  dextrose Oral Gel 15 Gram(s) Oral once PRN Blood Glucose LESS THAN 70 milliGRAM(s)/deciliter  zolpidem 5 milliGRAM(s) Oral at bedtime PRN Insomnia        RADIOLOGY: ***     CXR: bibasilar mild opacity   TLC:  OG:  ET tube:        CAM ICU:  ECHO:        CRITICAL CARE TIME SPENT: ***

## 2023-09-05 NOTE — CONSULT NOTE ADULT - ASSESSMENT
IMPRESSION:   stroke   CHF more then PNA   AFIB   ?? LAW      PLAN:    CNS: follow neurology   neuro Q4 hrs     HEENT: oral care     PULMONARY: cxr noemi   keep pox >92%v   need outpatient sleep study     CARDIOVASCULAR: cardiology eval   consider lasix 40 mg IV   keep is < os   BNP     GI: GI prophylaxis.  Feeding as per speech and swallow     RENAL:follow is and os   lytes     INFECTIOUS DISEASE: abx as per ID   nasal mrsa   procal   cx   doubt PNA     HEMATOLOGICAL:  DVT prophylaxis.    ENDOCRINE:  Follow up FS.  Insulin protocol if needed.    disposition as per cardiology and neurology   voiding trial

## 2023-09-05 NOTE — SWALLOW BEDSIDE ASSESSMENT ADULT - COMMENTS
+ toleration observed without overt symptoms of penetration/aspiration for Puree/mildly thick
+ toleration observed without overt symptoms of penetration/aspiration for Puree/mildly thick

## 2023-09-05 NOTE — PROGRESS NOTE ADULT - ASSESSMENT
69 yo male with hx HTN, IDDM, HLD, active smoker (+1PPD), CAD s/p PCI, A-Fib on Eliquis, HFrEF (30%) s/p AICD p/w expressive aphasia, right sided weakness and difficulty getting up from the ground. Last known normal was approximately 9/1 evening.  Pt found to have rapid Afib on monitor started on heparin gtt.  Currently symptoms much improved back to baseline with only dysarthria possibly edentulous suspect chronic.      Plan:  - routine EEG no seizure activity  - MR brain no contrast ( need cardiology clearance due to AICD/BVPPM)  - if AICD /BVPPM  NON-compatible, obtain repeat CTH no contrast.  - Continue heparin drip Maintain Stroke protocol PTT<65  - Continue Plavix 75mg daily for underlying CADhx  - Downgrade to q8h neuro checks  - -180  - management of afib per ICU/cardiology  - continue medicine management   - pt/ot/pmr/s&s  - neuro team will follow.

## 2023-09-05 NOTE — PROGRESS NOTE ADULT - SUBJECTIVE AND OBJECTIVE BOX
Patient seen and evaluated this am, feels improved, still with some difficulty speaking      T(F): 97.4 (09-05-23 @ 07:01), Max: 98.4 (09-04-23 @ 23:00)  HR: 102 (09-05-23 @ 09:11)  BP: 126/74 (09-05-23 @ 09:11)  RR: 20  SpO2: 91% (09-05-23 @ 09:11) (91% - 97%)    PHYSICAL EXAM:  GENERAL: NAD  HEAD:  Atraumatic, Normocephalic  EYES: EOMI, PERRLA, conjunctiva and sclera clear  NERVOUS SYSTEM:  Alert & Oriented X3, expressive aphasia   CHEST/LUNG: Clear to percussion bilaterally; No rales, rhonchi, wheezing, or rubs  HEART: Regular rate and rhythm; No murmurs, rubs, or gallops  ABDOMEN: Soft, Nontender, Nondistended; Bowel sounds present  EXTREMITIES:  2+ Peripheral Pulses, No clubbing, cyanosis, or edema    LABS  09-05    140  |  106  |  24<H>  ----------------------------<  128<H>  4.4   |  23  |  1.4    Ca    9.2      05 Sep 2023 05:44  Mg     2.3     09-04    TPro  6.2  /  Alb  3.8  /  TBili  1.0  /  DBili  0.4<H>  /  AST  53<H>  /  ALT  37  /  AlkPhos  34  09-05                          16.2   13.36 )-----------( 211      ( 05 Sep 2023 05:44 )             49.4     PT/INR - ( 03 Sep 2023 12:50 )   PT: 16.60 sec;   INR: 1.44 ratio         PTT - ( 05 Sep 2023 05:44 )  PTT:35.2 sec    CARDIAC ENZYMES  Creatine Kinase, Serum: 454 (09-03 @ 13:18)      Troponin T, Serum: 0.07 ng/mL (09-04-23 @ 06:14)  Troponin T, Serum: 0.07 ng/mL (09-03-23 @ 18:52)  Troponin T, Serum: 0.05 ng/mL (09-03-23 @ 12:50)      Culture Results:   No growth at 24 hours (09-03-23)  Culture Results:   No growth at 24 hours (09-03-23)    RADIOLOGY  < from: Xray Chest 1 View- PORTABLE-Urgent (Xray Chest 1 View- PORTABLE-Urgent .) (09.05.23 @ 09:13) >    Impression:    Improved bilateral interstitial opacities.    < end of copied text >  < from: CT Head No Cont (09.03.23 @ 21:49) >  IMPRESSION:  No evidence of acute transcortical infarct, acute intracranial   hemorrhage, or mass effect.    < end of copied text >  < from: CT Brain Perfusion Maps Stroke (09.03.23 @ 12:58) >    IMPRESSION:  CT PERFUSION:  Large (96 mL) volume of decreased cerebral blood flow within the left MCA   territory, concerning for ischemia. No core infarct detected.    CTA HEAD/NECK:  Atherosclerotic plaques are seen in the bilateral carotid bulbs and   carotid siphons contributing to up to moderate stenosis in the right   carotid bulb.      < end of copied text >    MEDICATIONS  (STANDING):  aMIOdarone    Tablet 200 milliGRAM(s) Oral two times a day  atorvastatin 80 milliGRAM(s) Oral at bedtime  cefepime   IVPB 2000 milliGRAM(s) IV Intermittent every 12 hours  chlorhexidine 2% Cloths 1 Application(s) Topical <User Schedule>  clopidogrel Tablet 75 milliGRAM(s) Oral daily  fenofibrate Tablet 145 milliGRAM(s) Oral daily  furosemide    Tablet 40 milliGRAM(s) Oral daily  heparin  Infusion 1400 Unit(s)/Hr (16 mL/Hr) IV Continuous <Continuous>  insulin lispro (ADMELOG) corrective regimen sliding scale   SubCutaneous three times a day before meals  metoprolol tartrate 100 milliGRAM(s) Oral two times a day  nicotine - 21 mG/24Hr(s) Patch 1 Patch Transdermal daily  sacubitril 24 mG/valsartan 26 mG 1 Tablet(s) Oral two times a day  vancomycin  IVPB 1500 milliGRAM(s) IV Intermittent every 24 hours    MEDICATIONS  (PRN):  dextrose Oral Gel 15 Gram(s) Oral once PRN Blood Glucose LESS THAN 70 milliGRAM(s)/deciliter  zolpidem 5 milliGRAM(s) Oral at bedtime PRN Insomnia

## 2023-09-05 NOTE — PROGRESS NOTE ADULT - ASSESSMENT
67 yo male with a medical hx of HTN, DM, HLD, active smoker (+1PPD), CAD s/p PCI, paroxysmal A-Fib, chronic HFrEF (30%) s/p AICD admitted to unit with  expressive aphasia associated with  right sided weakness and difficulty getting up from the ground, In ED had rapid HR and given IV Amiodarone    expressive aphasia and weakness r/o stroke / rapid afib     - Plavix, Lipitor   - IV heparin - check ptt and adjust   - amiodarone, metoprolol, Lasix   - cardiology follow up   - MRI brain   - tele   - PT/OT   - DC antibiotics if procal is low

## 2023-09-05 NOTE — PROGRESS NOTE ADULT - SUBJECTIVE AND OBJECTIVE BOX
TOSINFRANCESMONIQUE PATEL  68y, Male    LOS  2d    INTERVAL EVENTS/HPI  - No acute events overnight  - T(F): , Max: 100 (09-04-23 @ 11:02)  - Tolerating medication  - WBC Count: 13.36 (09-05-23 @ 05:44)  WBC Count: 16.73 (09-04-23 @ 06:14)  - Creatinine: 1.4 (09-05-23 @ 05:44)  Creatinine: 1.5 (09-04-23 @ 06:14)     REVIEW OF SYSTEMS: cannot obtain further history from the patient secondary to altered mental status    Prior hospital charts reviewed [Yes]  Primary team notes reviewed [Yes]  Other consultant notes reviewed [Yes]      ANTIMICROBIALS:   cefepime   IVPB 2000 every 12 hours  vancomycin  IVPB 1500 every 24 hours      OTHER MEDS: MEDICATIONS  (STANDING):  aMIOdarone Infusion 0.5 <Continuous>  aMIOdarone Infusion 1 <Continuous>  atorvastatin 80 at bedtime  clopidogrel Tablet 75 daily  dextrose 50% Injectable 25 once  dextrose 50% Injectable 12.5 once  dextrose 50% Injectable 25 once  dextrose Oral Gel 15 once PRN  fenofibrate Tablet 145 daily  furosemide    Tablet 40 daily  glucagon  Injectable 1 once  heparin  Infusion 1400 <Continuous>  insulin lispro (ADMELOG) corrective regimen sliding scale  three times a day before meals  metoprolol tartrate 100 two times a day  sacubitril 24 mG/valsartan 26 mG 1 two times a day  zolpidem 5 at bedtime PRN      Vital Signs Last 24 Hrs  T(F): 97.4 (09-05-23 @ 07:01), Max: 100.6 (09-03-23 @ 17:16)    Vital Signs Last 24 Hrs  HR: 135 (09-05-23 @ 05:22) (105 - 135)  BP: 146/78 (09-05-23 @ 05:22) (120/76 - 205/152)  RR: 20 (09-05-23 @ 07:01)  SpO2: 96% (09-05-23 @ 05:22) (94% - 99%)  Wt(kg): --    EXAM:  GENERAL: NAD, lying in bed  HEAD: No head lesions  NECK: Supple, nontender to palpation; no JVD  CHEST/LUNG: Clear to auscultation bilaterally  HEART: S1 S2  ABDOMEN: Soft, nontender, nondistended  EXTREMITIES: No clubbing, cyanosis, or petal edema  NERVOUS SYSTEM: Alert and oriented to person. Aphasia and dysarthria noted.   MSK: No joint erythema, swelling or pain.  SKIN: No rashes or lesions, no superficial thrombophlebitis    Labs:                        16.2   13.36 )-----------( 211      ( 05 Sep 2023 05:44 )             49.4     09-05    140  |  106  |  24<H>  ----------------------------<  128<H>  4.4   |  23  |  1.4    Ca    9.2      05 Sep 2023 05:44  Mg     2.3     09-04    TPro  6.2  /  Alb  3.8  /  TBili  1.0  /  DBili  0.4<H>  /  AST  53<H>  /  ALT  37  /  AlkPhos  34  09-05      WBC Trend:  WBC Count: 13.36 (09-05-23 @ 05:44)  WBC Count: 16.73 (09-04-23 @ 06:14)  WBC Count: 19.86 (09-03-23 @ 18:52)  WBC Count: 22.31 (09-03-23 @ 12:50)      Creatine Trend:  Creatinine: 1.4 (09-05)  Creatinine: 1.5 (09-04)  Creatinine: 1.6 (09-03)      Liver Biochemical Testing Trend:  Alanine Aminotransferase (ALT/SGPT): 37 (09-05)  Alanine Aminotransferase (ALT/SGPT): 35 (09-04)  Alanine Aminotransferase (ALT/SGPT): 33 (09-03)  Alanine Aminotransferase (ALT/SGPT): 48 *H* (11-29)  Aspartate Aminotransferase (AST/SGOT): 53 (09-05-23 @ 05:44)  Aspartate Aminotransferase (AST/SGOT): 67 (09-04-23 @ 06:14)  Aspartate Aminotransferase (AST/SGOT): 30 (09-03-23 @ 12:50)  Aspartate Aminotransferase (AST/SGOT): 43 (11-29-22 @ 14:46)  Bilirubin Direct: 0.4 (09-05)  Bilirubin Total: 1.0 (09-05)  Bilirubin Total: 0.7 (09-04)  Bilirubin Total: 0.8 (09-03)  Bilirubin Total, Serum: 0.7 (11-29)      Trend LDH      Urinalysis Basic - ( 05 Sep 2023 05:44 )    Color: x / Appearance: x / SG: x / pH: x  Gluc: 128 mg/dL / Ketone: x  / Bili: x / Urobili: x   Blood: x / Protein: x / Nitrite: x   Leuk Esterase: x / RBC: x / WBC x   Sq Epi: x / Non Sq Epi: x / Bacteria: x      MICROBIOLOGY:    Male    Culture - Blood (collected 03 Sep 2023 12:50)  Source: .Blood Blood  Preliminary Report:    No growth at 24 hours    Culture - Blood (collected 03 Sep 2023 12:50)  Source: .Blood Blood  Preliminary Report:    No growth at 24 hours    Procalcitonin, Serum: 0.20 (09-04)    Lactate, Blood: 2.0 (09-03 @ 18:52)  Lactate, Blood: 2.9 (09-03 @ 15:15)  Blood Gas Venous - Lactate: 6.30 (09-03 @ 13:18)        RADIOLOGY & ADDITIONAL TESTS:  I have personally reviewed the relevant images.   CXR  Xray Chest 1 View AP/PA:   ACC: [id number] EXAM:  XR CHEST 1 VIEW   ORDERED BY: KATIE JONES     PROCEDURE DATE:  09/03/2023          INTERPRETATION:  Clinical History / Reason for exam: Weakness    Comparison : Chest radiograph November 29, 2022.    Technique/Positioning: Single AP view of the chest.    Findings:    Support devices: Left chest wall AICD    Cardiac/mediastinum/hilum: Unchanged    Lung parenchyma/Pleura: Bilateral opacities/edema. No pneumothorax.    Skeleton/soft tissues: Unchanged    Impression:    Bilateral opacities/edema.        --- End of Report ---            ELLIOT LANDAU MD; Attending Radiologist  This document has been electronically signed. Sep  4 2023  9:39AM (09-03-23 @ 16:08)      CT        WEIGHT  Weight (kg): 93.2 (09-03-23 @ 18:00)  Creatinine: 1.4 mg/dL (09-05-23 @ 05:44)      All available historical records have been reviewed       TOSINJAMEEMONIQUE CHRISTINE  68y, Male    LOS  2d    INTERVAL EVENTS/HPI  - No acute events overnight  - T(F): , Max: 100 (09-04-23 @ 11:02)  - Tolerating medication  - WBC Count: 13.36 (09-05-23 @ 05:44)  WBC Count: 16.73 (09-04-23 @ 06:14)  - Creatinine: 1.4 (09-05-23 @ 05:44)  Creatinine: 1.5 (09-04-23 @ 06:14)  - Able to talk and move extremities. Following commands.     REVIEW OF SYSTEMS: As per HPI    Prior hospital charts reviewed [Yes]  Primary team notes reviewed [Yes]  Other consultant notes reviewed [Yes]      ANTIMICROBIALS:   cefepime   IVPB 2000 every 12 hours  vancomycin  IVPB 1500 every 24 hours      OTHER MEDS: MEDICATIONS  (STANDING):  aMIOdarone Infusion 0.5 <Continuous>  aMIOdarone Infusion 1 <Continuous>  atorvastatin 80 at bedtime  clopidogrel Tablet 75 daily  dextrose 50% Injectable 25 once  dextrose 50% Injectable 12.5 once  dextrose 50% Injectable 25 once  dextrose Oral Gel 15 once PRN  fenofibrate Tablet 145 daily  furosemide    Tablet 40 daily  glucagon  Injectable 1 once  heparin  Infusion 1400 <Continuous>  insulin lispro (ADMELOG) corrective regimen sliding scale  three times a day before meals  metoprolol tartrate 100 two times a day  sacubitril 24 mG/valsartan 26 mG 1 two times a day  zolpidem 5 at bedtime PRN      Vital Signs Last 24 Hrs  T(F): 97.4 (09-05-23 @ 07:01), Max: 100.6 (09-03-23 @ 17:16)    Vital Signs Last 24 Hrs  HR: 135 (09-05-23 @ 05:22) (105 - 135)  BP: 146/78 (09-05-23 @ 05:22) (120/76 - 205/152)  RR: 20 (09-05-23 @ 07:01)  SpO2: 96% (09-05-23 @ 05:22) (94% - 99%)  Wt(kg): --    EXAM:  GENERAL: NAD, lying in bed  HEAD: No head lesions  NECK: Supple, nontender to palpation; no JVD  CHEST/LUNG: Clear to auscultation bilaterally  HEART: S1 S2  ABDOMEN: Soft, nontender, nondistended  EXTREMITIES: No clubbing, cyanosis, or petal edema  NERVOUS SYSTEM: Alert and oriented to person. Speaking in full sentences.   MSK: No joint erythema, swelling or pain.  SKIN: No rashes or lesions, no superficial thrombophlebitis    Labs:                        16.2   13.36 )-----------( 211      ( 05 Sep 2023 05:44 )             49.4     09-05    140  |  106  |  24<H>  ----------------------------<  128<H>  4.4   |  23  |  1.4    Ca    9.2      05 Sep 2023 05:44  Mg     2.3     09-04    TPro  6.2  /  Alb  3.8  /  TBili  1.0  /  DBili  0.4<H>  /  AST  53<H>  /  ALT  37  /  AlkPhos  34  09-05      WBC Trend:  WBC Count: 13.36 (09-05-23 @ 05:44)  WBC Count: 16.73 (09-04-23 @ 06:14)  WBC Count: 19.86 (09-03-23 @ 18:52)  WBC Count: 22.31 (09-03-23 @ 12:50)      Creatine Trend:  Creatinine: 1.4 (09-05)  Creatinine: 1.5 (09-04)  Creatinine: 1.6 (09-03)      Liver Biochemical Testing Trend:  Alanine Aminotransferase (ALT/SGPT): 37 (09-05)  Alanine Aminotransferase (ALT/SGPT): 35 (09-04)  Alanine Aminotransferase (ALT/SGPT): 33 (09-03)  Alanine Aminotransferase (ALT/SGPT): 48 *H* (11-29)  Aspartate Aminotransferase (AST/SGOT): 53 (09-05-23 @ 05:44)  Aspartate Aminotransferase (AST/SGOT): 67 (09-04-23 @ 06:14)  Aspartate Aminotransferase (AST/SGOT): 30 (09-03-23 @ 12:50)  Aspartate Aminotransferase (AST/SGOT): 43 (11-29-22 @ 14:46)  Bilirubin Direct: 0.4 (09-05)  Bilirubin Total: 1.0 (09-05)  Bilirubin Total: 0.7 (09-04)  Bilirubin Total: 0.8 (09-03)  Bilirubin Total, Serum: 0.7 (11-29)      Trend LDH      Urinalysis Basic - ( 05 Sep 2023 05:44 )    Color: x / Appearance: x / SG: x / pH: x  Gluc: 128 mg/dL / Ketone: x  / Bili: x / Urobili: x   Blood: x / Protein: x / Nitrite: x   Leuk Esterase: x / RBC: x / WBC x   Sq Epi: x / Non Sq Epi: x / Bacteria: x      MICROBIOLOGY:    Male    Culture - Blood (collected 03 Sep 2023 12:50)  Source: .Blood Blood  Preliminary Report:    No growth at 24 hours    Culture - Blood (collected 03 Sep 2023 12:50)  Source: .Blood Blood  Preliminary Report:    No growth at 24 hours    Procalcitonin, Serum: 0.20 (09-04)    Lactate, Blood: 2.0 (09-03 @ 18:52)  Lactate, Blood: 2.9 (09-03 @ 15:15)  Blood Gas Venous - Lactate: 6.30 (09-03 @ 13:18)        RADIOLOGY & ADDITIONAL TESTS:  I have personally reviewed the relevant images.   CXR  Xray Chest 1 View AP/PA:   ACC: [id number] EXAM:  XR CHEST 1 VIEW   ORDERED BY: KATIE JONES     PROCEDURE DATE:  09/03/2023          INTERPRETATION:  Clinical History / Reason for exam: Weakness    Comparison : Chest radiograph November 29, 2022.    Technique/Positioning: Single AP view of the chest.    Findings:    Support devices: Left chest wall AICD    Cardiac/mediastinum/hilum: Unchanged    Lung parenchyma/Pleura: Bilateral opacities/edema. No pneumothorax.    Skeleton/soft tissues: Unchanged    Impression:    Bilateral opacities/edema.        --- End of Report ---            ELLIOT LANDAU MD; Attending Radiologist  This document has been electronically signed. Sep  4 2023  9:39AM (09-03-23 @ 16:08)      CT        WEIGHT  Weight (kg): 93.2 (09-03-23 @ 18:00)  Creatinine: 1.4 mg/dL (09-05-23 @ 05:44)      All available historical records have been reviewed

## 2023-09-05 NOTE — PROGRESS NOTE ADULT - ASSESSMENT
68M with CAD s/p PCI, HFrEF s/p CRT-D, AF on Eliquis, HTN, HLD, DM, AS s/p AVR who presents with expressive aphasia and right sided weakness for further CVA work up.     AF: AF with RVR.   -Completed IV amio load.  -Switch to amio 200mg PO BID for 1 week then decrease to 200mg daily.   -Continue with metoprolol tartrate 100mg PO BID.   -Pt feels warm and diaphoretic.  -With leukocytosis, would check fever.   -If still difficult to control rate, may need to consider digoxin.   -Pt HD stable and asymptomatic, would wait for amiodarone load to continue before changing meds.     HFrEF: EF 38%. CRT-D in place  -Continue with GDMT.   -If renal function remains stable, consider adding SGLT2i to regimen.     Discussed with ICU team.

## 2023-09-05 NOTE — PROGRESS NOTE ADULT - SUBJECTIVE AND OBJECTIVE BOX
MONIQUE LYONS 68y Male  MRN#: [id number]   CODE STATUS: FULL     Hospital Day: 2d    Pt is currently admitted with the primary diagnosis of slurred speech     SUBJECTIVE  Hospital Course    69 yo male with a medical hx of HTN, DM, HLD, active smoker (+1PPD), CAD s/p PCI, paroxysmal A-Fib, chronic HFrEF (30%) s/p AICD admitted to unit with  expressive aphasia associated with  right sided weakness and difficulty getting up from the ground, In ED had rapid HR and given IV Amiodarone     Overnight events: No overnight events     Subjective complaints: Still with slurred speech, otherwise has no complaints.                                               ----------------------------------------------------------  OBJECTIVE  PAST MEDICAL & SURGICAL HISTORY  CHF (congestive heart failure)    Diabetes    CAD (coronary artery disease)    Chronic obstructive pulmonary disease (COPD)    HTN (hypertension)    Stented coronary artery    Dyslipidemia    GERD (gastroesophageal reflux disease)    Afib    H/O heart artery stent    Heart valve replaced  aortic    History of Nissen fundoplication                                              -----------------------------------------------------------  ALLERGIES:  sulfa drugs (Unknown)                                            ------------------------------------------------------------    HOME MEDICATIONS  Home Medications:  atorvastatin 40 mg oral tablet: 1 tab(s) orally once a day (at bedtime) (05 Feb 2020 17:41)  clopidogrel 75 mg oral tablet: 1 tab(s) orally once a day (05 Feb 2020 17:41)  Entresto 24 mg-26 mg oral tablet: 1 tab(s) orally 2 times a day (05 Feb 2020 17:41)  ezetimibe 10 mg oral tablet: 1 orally once a day (03 Sep 2023 16:41)  fenofibrate 145 mg oral tablet: 1 orally once a day (03 Sep 2023 16:41)  furosemide 40 mg oral tablet: 1 orally once a day (03 Sep 2023 16:41)  Lunesta 1 mg oral tablet: 1 orally once a day (at bedtime) (03 Sep 2023 16:41)  metoprolol succinate 200 mg oral capsule, extended release: 1 cap(s) orally once a day (05 Feb 2020 17:41)  NovoLOG 100 units/mL injectable solution: 8 unit(s) injectable once a day (05 Feb 2020 17:41)  OMEGA-3 ETHYL ESTERS 1 GM CAP: 1 cap(s) orally once a day (05 Feb 2020 17:41)  Tresiba 100 units/mL subcutaneous solution: 150 unit(s) subcutaneous once a day (05 Feb 2020 17:41)                           MEDICATIONS:  STANDING MEDICATIONS  aMIOdarone    Tablet 200 milliGRAM(s) Oral two times a day  atorvastatin 80 milliGRAM(s) Oral at bedtime  cefepime   IVPB 2000 milliGRAM(s) IV Intermittent every 12 hours  chlorhexidine 2% Cloths 1 Application(s) Topical <User Schedule>  clopidogrel Tablet 75 milliGRAM(s) Oral daily  dextrose 5%. 1000 milliLiter(s) IV Continuous <Continuous>  dextrose 5%. 1000 milliLiter(s) IV Continuous <Continuous>  dextrose 50% Injectable 25 Gram(s) IV Push once  dextrose 50% Injectable 12.5 Gram(s) IV Push once  dextrose 50% Injectable 25 Gram(s) IV Push once  fenofibrate Tablet 145 milliGRAM(s) Oral daily  furosemide    Tablet 40 milliGRAM(s) Oral daily  glucagon  Injectable 1 milliGRAM(s) IntraMuscular once  heparin  Infusion 1400 Unit(s)/Hr IV Continuous <Continuous>  insulin lispro (ADMELOG) corrective regimen sliding scale   SubCutaneous three times a day before meals  metoprolol tartrate 100 milliGRAM(s) Oral two times a day  nicotine - 21 mG/24Hr(s) Patch 1 Patch Transdermal daily  sacubitril 24 mG/valsartan 26 mG 1 Tablet(s) Oral two times a day  vancomycin  IVPB 1500 milliGRAM(s) IV Intermittent every 24 hours    PRN MEDICATIONS  dextrose Oral Gel 15 Gram(s) Oral once PRN  zolpidem 5 milliGRAM(s) Oral at bedtime PRN                                            ------------------------------------------------------------  VITAL SIGNS: Last 24 Hours  T(C): 36.3 (05 Sep 2023 07:01), Max: 36.9 (04 Sep 2023 23:00)  T(F): 97.4 (05 Sep 2023 07:01), Max: 98.4 (04 Sep 2023 23:00)  HR: 102 (05 Sep 2023 09:11) (102 - 135)  BP: 126/74 (05 Sep 2023 09:11) (110/80 - 205/152)  BP(mean): 95 (05 Sep 2023 09:11) (91 - 176)  RR: 26 (05 Sep 2023 09:11) (19 - 39)  SpO2: 91% (05 Sep 2023 09:11) (91% - 97%)      09-04-23 @ 07:01  -  09-05-23 @ 07:00  --------------------------------------------------------  IN: 1552.9 mL / OUT: 1195 mL / NET: 357.9 mL    09-05-23 @ 07:01  -  09-05-23 @ 13:11  --------------------------------------------------------  IN: 492 mL / OUT: 600 mL / NET: -108 mL                                             --------------------------------------------------------------  LABS:                        16.2   13.36 )-----------( 211      ( 05 Sep 2023 05:44 )             49.4     09-05    140  |  106  |  24<H>  ----------------------------<  128<H>  4.4   |  23  |  1.4    Ca    9.2      05 Sep 2023 05:44  Mg     2.3     09-04    TPro  6.2  /  Alb  3.8  /  TBili  1.0  /  DBili  0.4<H>  /  AST  53<H>  /  ALT  37  /  AlkPhos  34  09-05    PTT - ( 05 Sep 2023 05:44 )  PTT:35.2 sec  Urinalysis Basic - ( 05 Sep 2023 05:44 )    Color: x / Appearance: x / SG: x / pH: x  Gluc: 128 mg/dL / Ketone: x  / Bili: x / Urobili: x   Blood: x / Protein: x / Nitrite: x   Leuk Esterase: x / RBC: x / WBC x   Sq Epi: x / Non Sq Epi: x / Bacteria: x        Culture - Blood (collected 03 Sep 2023 12:50)  Source: .Blood Blood  Preliminary Report (05 Sep 2023 01:02):    No growth at 24 hours    Culture - Blood (collected 03 Sep 2023 12:50)  Source: .Blood Blood  Preliminary Report (05 Sep 2023 01:02):    No growth at 24 hours        CARDIAC MARKERS ( 04 Sep 2023 06:14 )  x     / 0.07 ng/mL / x     / x     / x      CARDIAC MARKERS ( 03 Sep 2023 18:52 )  x     / 0.07 ng/mL / x     / x     / x      CARDIAC MARKERS ( 03 Sep 2023 13:18 )  x     / x     / 454 U/L / x     / x                                                  -------------------------------------------------------------  RADIOLOGY:    CT 9/3:    IMPRESSION:  No evidence of acute transcortical infarct, acute intracranial   hemorrhage, or mass effect.    CT PERFUSION:  Large (96 mL) volume of decreased cerebral blood flow within the left MCA   territory, concerning for ischemia. No core infarct detected.    CTA HEAD/NECK:  Atherosclerotic plaques are seen in the bilateral carotid bulbs and   carotid siphons contributing to up to moderate stenosis in the right   carotid bulb.    Echo: 9/4  Summary:   1. LV Ejection Fraction by Beck's Method with a biplane EF of 38 %.   2. Mild left ventricular hypertrophy.   3. Mildly enlarged left atrium.   4. There is no evidence of pericardial effusion.   5. Mild mitral annular calcification.   6. Bioprosthesis in the aortic position.                                              --------------------------------------------------------------    PHYSICAL EXAM:  GENERAL: NAD,  AAOx3, slurred speech   HEENT:  Atraumatic, Normocephalic. conjunctiva and sclera clear, No JVD  PULMONARY: Clear to auscultation bilaterally; No wheeze  CARDIOVASCULAR: irregular rate and rhythm. No murmurs, rubs, or gallops  GASTROINTESTINAL: Soft, Nontender, Nondistended; Bowel sounds present  MUSCULOSKELETAL:  2+ Peripheral Pulses, No clubbing, cyanosis, or edema  NEUROLOGY: left arm slightly weaker then right   SKIN: No rashes or lesions                                           --------------------------------------------------------------    ASSESSMENT & PLAN    69 yo male with a medical hx of HTN, DM, HLD, active smoker (+1PPD), CAD s/p PCI, paroxysmal A-Fib, chronic HFrEF (30%) s/p AICD admitted to unit with  expressive aphasia associated with  right sided weakness and difficulty getting up from the ground, admitted for acute stroke/ Afib with RVR       #Suspected left MCA ischemic stroke  -CT head: no acute infarct   -CT Perfusion: Large (96 mL) volume of decreased cerebral blood flow within the left MCA   territory, concerning for ischemia  -Continue Plavix, heparin gtt and statin. Ptt goal < 65   - q8 neurochecks as per neuro   - MRI ordered > pacemaker is MR conditional [employer] [id number] Claria MRI   - PT/OT/ST  - Speech and swallow >> pureed diet   - A1c, lipid profile    #A-Fib with RVR  #Episodes of SVTs  - Cardiology interrogated BiV ICD on 9/3: showed atrial tachy/Afib w/rvr, possible SVT?  - s/p amiodarone gtt, now on amiodarone 200mg BID starting 9/5/23   - c/w metoprolol tartrate 100mg BID   - Ok for AC as per neuro > heparin gtt for now with PTT goal < 65 as per neuro   - HR low 100s   -cardio on board; f/u recs    #Leukocytosis w/ elevated lactate: resolving   -UA negative  -Chest Xray with infiltrates likely edema improving on lasix   - Blood culture negative   - MRSA pending   - D/C vanc and cefepime   -f/u ID    #CAD s/p PCI   - stable, EKG no ischemic changes, elevated troponin likely demand ischemia trend, cont Plavix, statin    #HFrEF (30%) s/p AICD   - stable, cont Entresto, BB, amiodarone, plavix, lasix, heparin gtt   #HTN   - c/w meds as above   #HLD - statin as above    #IDDM   - f/u a1c  - on insulin at home   - patient just resumed PO diet pureed  - Sliding scale for now, if FS persistantly elevated and tolerating PO diet, can start standing lantus + lispro     #Active smoker (+1PPD) - encourage cessation; offer nicotine replacement    #Misc  DVT ppx- heparin gtt   Diet- pureed   Activity-IAT  Code- Full.

## 2023-09-06 LAB
ALBUMIN SERPL ELPH-MCNC: 3.6 G/DL — SIGNIFICANT CHANGE UP (ref 3.5–5.2)
ALP SERPL-CCNC: 40 U/L — SIGNIFICANT CHANGE UP (ref 30–115)
ALT FLD-CCNC: 40 U/L — SIGNIFICANT CHANGE UP (ref 0–41)
ANION GAP SERPL CALC-SCNC: 12 MMOL/L — SIGNIFICANT CHANGE UP (ref 7–14)
APTT BLD: 61 SEC — HIGH (ref 27–39.2)
APTT BLD: 79.9 SEC — CRITICAL HIGH (ref 27–39.2)
AST SERPL-CCNC: 39 U/L — SIGNIFICANT CHANGE UP (ref 0–41)
BASOPHILS # BLD AUTO: 0.08 K/UL — SIGNIFICANT CHANGE UP (ref 0–0.2)
BASOPHILS NFR BLD AUTO: 0.7 % — SIGNIFICANT CHANGE UP (ref 0–1)
BILIRUB SERPL-MCNC: 0.9 MG/DL — SIGNIFICANT CHANGE UP (ref 0.2–1.2)
BLD GP AB SCN SERPL QL: SIGNIFICANT CHANGE UP
BUN SERPL-MCNC: 23 MG/DL — HIGH (ref 10–20)
CALCIUM SERPL-MCNC: 8.9 MG/DL — SIGNIFICANT CHANGE UP (ref 8.4–10.5)
CHLORIDE SERPL-SCNC: 100 MMOL/L — SIGNIFICANT CHANGE UP (ref 98–110)
CO2 SERPL-SCNC: 25 MMOL/L — SIGNIFICANT CHANGE UP (ref 17–32)
CREAT SERPL-MCNC: 1.3 MG/DL — SIGNIFICANT CHANGE UP (ref 0.7–1.5)
EGFR: 60 ML/MIN/1.73M2 — SIGNIFICANT CHANGE UP
EOSINOPHIL # BLD AUTO: 0.32 K/UL — SIGNIFICANT CHANGE UP (ref 0–0.7)
EOSINOPHIL NFR BLD AUTO: 2.9 % — SIGNIFICANT CHANGE UP (ref 0–8)
GLUCOSE BLDC GLUCOMTR-MCNC: 154 MG/DL — HIGH (ref 70–99)
GLUCOSE BLDC GLUCOMTR-MCNC: 166 MG/DL — HIGH (ref 70–99)
GLUCOSE BLDC GLUCOMTR-MCNC: 193 MG/DL — HIGH (ref 70–99)
GLUCOSE BLDC GLUCOMTR-MCNC: 319 MG/DL — HIGH (ref 70–99)
GLUCOSE SERPL-MCNC: 141 MG/DL — HIGH (ref 70–99)
HCT VFR BLD CALC: 48.9 % — SIGNIFICANT CHANGE UP (ref 42–52)
HGB BLD-MCNC: 16.1 G/DL — SIGNIFICANT CHANGE UP (ref 14–18)
IMM GRANULOCYTES NFR BLD AUTO: 0.5 % — HIGH (ref 0.1–0.3)
INR BLD: 1.28 RATIO — SIGNIFICANT CHANGE UP (ref 0.65–1.3)
LYMPHOCYTES # BLD AUTO: 1.94 K/UL — SIGNIFICANT CHANGE UP (ref 1.2–3.4)
LYMPHOCYTES # BLD AUTO: 17.3 % — LOW (ref 20.5–51.1)
MAGNESIUM SERPL-MCNC: 2.3 MG/DL — SIGNIFICANT CHANGE UP (ref 1.8–2.4)
MCHC RBC-ENTMCNC: 29.7 PG — SIGNIFICANT CHANGE UP (ref 27–31)
MCHC RBC-ENTMCNC: 32.9 G/DL — SIGNIFICANT CHANGE UP (ref 32–37)
MCV RBC AUTO: 90.1 FL — SIGNIFICANT CHANGE UP (ref 80–94)
MONOCYTES # BLD AUTO: 1 K/UL — HIGH (ref 0.1–0.6)
MONOCYTES NFR BLD AUTO: 8.9 % — SIGNIFICANT CHANGE UP (ref 1.7–9.3)
NEUTROPHILS # BLD AUTO: 7.81 K/UL — HIGH (ref 1.4–6.5)
NEUTROPHILS NFR BLD AUTO: 69.7 % — SIGNIFICANT CHANGE UP (ref 42.2–75.2)
NRBC # BLD: 0 /100 WBCS — SIGNIFICANT CHANGE UP (ref 0–0)
NT-PROBNP SERPL-SCNC: 2025 PG/ML — HIGH (ref 0–300)
PHOSPHATE SERPL-MCNC: 3.2 MG/DL — SIGNIFICANT CHANGE UP (ref 2.1–4.9)
PLATELET # BLD AUTO: 207 K/UL — SIGNIFICANT CHANGE UP (ref 130–400)
PMV BLD: 12 FL — HIGH (ref 7.4–10.4)
POTASSIUM SERPL-MCNC: 4.6 MMOL/L — SIGNIFICANT CHANGE UP (ref 3.5–5)
POTASSIUM SERPL-SCNC: 4.6 MMOL/L — SIGNIFICANT CHANGE UP (ref 3.5–5)
PROCALCITONIN SERPL-MCNC: 0.15 NG/ML — HIGH (ref 0.02–0.1)
PROT SERPL-MCNC: 5.8 G/DL — LOW (ref 6–8)
PROTHROM AB SERPL-ACNC: 14.7 SEC — HIGH (ref 9.95–12.87)
RBC # BLD: 5.43 M/UL — SIGNIFICANT CHANGE UP (ref 4.7–6.1)
RBC # FLD: 13.4 % — SIGNIFICANT CHANGE UP (ref 11.5–14.5)
SODIUM SERPL-SCNC: 137 MMOL/L — SIGNIFICANT CHANGE UP (ref 135–146)
WBC # BLD: 11.21 K/UL — HIGH (ref 4.8–10.8)
WBC # FLD AUTO: 11.21 K/UL — HIGH (ref 4.8–10.8)

## 2023-09-06 PROCEDURE — 99233 SBSQ HOSP IP/OBS HIGH 50: CPT

## 2023-09-06 PROCEDURE — 93010 ELECTROCARDIOGRAM REPORT: CPT

## 2023-09-06 PROCEDURE — 99232 SBSQ HOSP IP/OBS MODERATE 35: CPT

## 2023-09-06 RX ADMIN — Medication 40 MILLIGRAM(S): at 05:25

## 2023-09-06 RX ADMIN — Medication 1: at 11:31

## 2023-09-06 RX ADMIN — Medication 1: at 07:43

## 2023-09-06 RX ADMIN — SACUBITRIL AND VALSARTAN 1 TABLET(S): 24; 26 TABLET, FILM COATED ORAL at 05:25

## 2023-09-06 RX ADMIN — Medication 1 PATCH: at 11:25

## 2023-09-06 RX ADMIN — Medication 145 MILLIGRAM(S): at 11:26

## 2023-09-06 RX ADMIN — HEPARIN SODIUM 15 UNIT(S)/HR: 5000 INJECTION INTRAVENOUS; SUBCUTANEOUS at 17:39

## 2023-09-06 RX ADMIN — ATORVASTATIN CALCIUM 80 MILLIGRAM(S): 80 TABLET, FILM COATED ORAL at 21:27

## 2023-09-06 RX ADMIN — Medication 1 PATCH: at 19:00

## 2023-09-06 RX ADMIN — SACUBITRIL AND VALSARTAN 1 TABLET(S): 24; 26 TABLET, FILM COATED ORAL at 17:20

## 2023-09-06 RX ADMIN — CLOPIDOGREL BISULFATE 75 MILLIGRAM(S): 75 TABLET, FILM COATED ORAL at 11:27

## 2023-09-06 RX ADMIN — Medication 100 MILLIGRAM(S): at 17:20

## 2023-09-06 RX ADMIN — HEPARIN SODIUM 16 UNIT(S)/HR: 5000 INJECTION INTRAVENOUS; SUBCUTANEOUS at 07:47

## 2023-09-06 RX ADMIN — CHLORHEXIDINE GLUCONATE 1 APPLICATION(S): 213 SOLUTION TOPICAL at 05:22

## 2023-09-06 RX ADMIN — AMIODARONE HYDROCHLORIDE 200 MILLIGRAM(S): 400 TABLET ORAL at 05:23

## 2023-09-06 RX ADMIN — Medication 4: at 16:46

## 2023-09-06 RX ADMIN — Medication 100 MILLIGRAM(S): at 05:23

## 2023-09-06 RX ADMIN — ZOLPIDEM TARTRATE 5 MILLIGRAM(S): 10 TABLET ORAL at 21:31

## 2023-09-06 RX ADMIN — Medication 5 MILLIGRAM(S): at 21:27

## 2023-09-06 RX ADMIN — HEPARIN SODIUM 16 UNIT(S)/HR: 5000 INJECTION INTRAVENOUS; SUBCUTANEOUS at 10:40

## 2023-09-06 RX ADMIN — AMIODARONE HYDROCHLORIDE 200 MILLIGRAM(S): 400 TABLET ORAL at 17:20

## 2023-09-06 NOTE — PROGRESS NOTE ADULT - ASSESSMENT
This is a 69 yo male with hx HTN, IDDM, HLD, active smoker (+1PPD), CAD s/p PCI, A-Fib on Eliquis, HFrEF (30%) s/p AICD p/w expressive aphasia, right sided weakness and difficulty getting up from the ground    IMPRESSION  #Acute Stroke- Being worked up by the neurology  #Sepsis on admission  #Lactic acidosis  #HTN, Afib, HFrEF with AICD  #Obesity BMI (kg/m2): 33.2  #DM   #Bilateral opacities noted on CXR- possible edema    RECOMMENDATIONS  -Cultures remain NGTD  -Monitor off antibiotics.  -Maintain aspiration precautions.   -Recall ID as needed.     If any questions, please send a message or call on [employer] Teams  Please continue to update ID with any pertinent new laboratory or radiographic findings.    Benigno Pond M.D  Infectious Diseases Attending  [facility]

## 2023-09-06 NOTE — SWALLOW BEDSIDE ASSESSMENT ADULT - ASR SWALLOW ASPIRATION MONITOR
Instructed to call immediately if any new distortion, blurring, decreased vision or eye pain.
change of breathing pattern/oral hygiene/position upright (90Y)/cough/gurgly voice/fever/pneumonia/throat clearing/upper respiratory infection

## 2023-09-06 NOTE — SWALLOW BEDSIDE ASSESSMENT ADULT - SLP PERTINENT HISTORY OF CURRENT PROBLEM
67 yo male with hx HTN, IDDM, HLD, active smoker (+1PPD), CAD s/p PCI, A-Fib on Eliquis, HFrEF (30%) s/p AICD p/w expressive aphasia, right sided weakness and difficulty getting up from the ground since yesterday morning. Last known normal was approximately 9/1 evening, pt is AAOx3, conversational and ambulates independently at baseline as per spouse. According to spouse, pt compliant with his medications. Stroke code called in ED, CTH neg, perfusion showed L sided perfusion defect, no LVO on angiogram, moderate stenosis of R carotid bulb. On exam today patient is aphasic with R sided drift. Rapid afib on monitor. 9/4 NIHSS 7 as above

## 2023-09-06 NOTE — PROGRESS NOTE ADULT - ASSESSMENT
IMPRESSION:   stroke   CHF more then PNA   AFIB   ?? LAW      PLAN:    CNS: follow neurology   neuro Q4 hrs     HEENT: oral care     PULMONARY: cxr today   keep pox >92%v   need outpatient sleep study     CARDIOVASCULAR: cardiology eval   consider lasix 40 mg IV   keep is < os   BNP     GI: GI prophylaxis.  Feeding as per speech and swallow     RENAL:follow is and os   lytes     INFECTIOUS DISEASE: off abx for now   follow cxr     doubt PNA     HEMATOLOGICAL:  DVT prophylaxis.    ENDOCRINE:  Follow up FS.  Insulin protocol if needed.    disposition as per cardiology and neurology     recall prn

## 2023-09-06 NOTE — SWALLOW BEDSIDE ASSESSMENT ADULT - ADDITIONAL RECOMMENDATIONS
SLP services to f/u
SLP services to f/u
Tolerating baseline diet w/o overt s/s aspiration. Significant improvement.

## 2023-09-06 NOTE — SWALLOW BEDSIDE ASSESSMENT ADULT - SLP GENERAL OBSERVATIONS
Pt. received sitting upright OOB ot chair feeding self. On NC. Family at the bedside
Pt. received in bed asleep awoke upon auditory stimuli. Improved expressive language skills.

## 2023-09-06 NOTE — PROGRESS NOTE ADULT - SUBJECTIVE AND OBJECTIVE BOX
Patient is awake and alert, feeling improved       T(F): 98 (09-06-23 @ 07:01), Max: 98.2 (09-05-23 @ 23:25)  HR: 91 (09-06-23 @ 07:01)  BP: 147/70 (09-06-23 @ 07:01)  RR: 20 (09-06-23 @ 07:01)  SpO2: 96% (09-06-23 @ 07:01) (91% - 96%)    PHYSICAL EXAM:  GENERAL: NAD  HEAD:  Atraumatic, Normocephalic  EYES: EOMI, PERRLA, conjunctiva and sclera clear  NERVOUS SYSTEM:  Alert & Oriented X3, expressive aphasia   CHEST/LUNG: Clear to percussion bilaterally; No rales, rhonchi, wheezing, or rubs  HEART: Regular rate and rhythm; No murmurs, rubs, or gallops  ABDOMEN: Soft, Nontender, Nondistended; Bowel sounds present  EXTREMITIES:  2+ Peripheral Pulses, No clubbing, cyanosis, or edema    LABS  09-05    140  |  106  |  24<H>  ----------------------------<  128<H>  4.4   |  23  |  1.4    Ca    9.2      05 Sep 2023 05:44    TPro  6.2  /  Alb  3.8  /  TBili  1.0  /  DBili  0.4<H>  /  AST  53<H>  /  ALT  37  /  AlkPhos  34  09-05                          16.1   11.21 )-----------( 207      ( 06 Sep 2023 05:58 )             48.9     PT/INR - ( 06 Sep 2023 05:58 )   PT: 14.70 sec;   INR: 1.28 ratio         PTT - ( 06 Sep 2023 05:58 )  PTT:61.0 sec    CARDIAC ENZYMES  Creatine Kinase, Serum: 454 (09-03 @ 13:18)      Troponin T, Serum: 0.07 ng/mL (09-04-23 @ 06:14)  Troponin T, Serum: 0.07 ng/mL (09-03-23 @ 18:52)  Troponin T, Serum: 0.05 ng/mL (09-03-23 @ 12:50)      Culture Results:   No growth at 48 Hours (09-03-23)  Culture Results:   No growth at 48 Hours (09-03-23)    RADIOLOGY  < from: Xray Chest 1 View- PORTABLE-Urgent (Xray Chest 1 View- PORTABLE-Urgent .) (09.05.23 @ 09:13) >    Impression:    Improved bilateral interstitial opacities.      < end of copied text >  < from: CT Head No Cont (09.03.23 @ 21:49) >  IMPRESSION:  No evidence of acute transcortical infarct, acute intracranial   hemorrhage, or mass effect.      < end of copied text >    MEDICATIONS  (STANDING):  Amiodarone    Tablet 200 milliGRAM(s)  Plavix  Lipitor  fenofibrate Tablet 145 milliGRAM(s) Oral daily  furosemide    Tablet 40 milliGRAM(s) Oral daily  heparin  Infusion 1400 Unit(s)/Hr (16 mL/Hr) IV Continuous <Continuous>  insulin lispro (ADMELOG) corrective regimen sliding scale   SubCutaneous three times a day before meals  melatonin 5 milliGRAM(s) Oral at bedtime  metoprolol tartrate 100 milliGRAM(s) Oral two times a day  nicotine - 21 mG/24Hr(s) Patch 1 Patch Transdermal daily  sacubitril 24 mG/valsartan 26 mG 1 Tablet(s) Oral two times a day    MEDICATIONS  (PRN):  dextrose Oral Gel 15 Gram(s) Oral once PRN Blood Glucose LESS THAN 70 milliGRAM(s)/deciliter  zolpidem 5 milliGRAM(s) Oral at bedtime PRN Insomnia

## 2023-09-06 NOTE — PROGRESS NOTE ADULT - SUBJECTIVE AND OBJECTIVE BOX
Patient is a 68y old  Male who presents with a chief complaint of Stroke (05 Sep 2023 11:12)      Over Night Events:  Patient seen and examined.   no temp no resp distress     ROS:  See HPI    PHYSICAL EXAM    ICU Vital Signs Last 24 Hrs  T(C): 36.7 (06 Sep 2023 07:01), Max: 36.8 (05 Sep 2023 23:25)  T(F): 98 (06 Sep 2023 07:01), Max: 98.2 (05 Sep 2023 23:25)  HR: 119 (06 Sep 2023 05:23) (92 - 121)  BP: 147/93 (06 Sep 2023 05:23) (111/76 - 147/93)  BP(mean): 115 (06 Sep 2023 05:23) (80 - 115)  ABP: --  ABP(mean): --  RR: 20 (06 Sep 2023 07:01) (20 - 31)  SpO2: 96% (06 Sep 2023 05:23) (91% - 96%)    O2 Parameters below as of 06 Sep 2023 05:23  Patient On (Oxygen Delivery Method): nasal cannula  O2 Flow (L/min): 2          General:awake   HEENT:         teodoro       Lymph Nodes: NO cervical LN   Lungs: Bilateral BS  Cardiovascular: Regular   Abdomen: Soft, Positive BS  Extremities: No clubbing   Skin: warm   Neurological: no focal   Musculoskeletal: move all ext     I&O's Detail    05 Sep 2023 07:01  -  06 Sep 2023 07:00  --------------------------------------------------------  IN:    Heparin: 328 mL    Oral Fluid: 780 mL  Total IN: 1108 mL    OUT:    Indwelling Catheter - Urethral (mL): 1900 mL    Intermittent Catheterization - Urethral (mL): 500 mL  Total OUT: 2400 mL    Total NET: -1292 mL      06 Sep 2023 07:01  -  06 Sep 2023 07:24  --------------------------------------------------------  IN:  Total IN: 0 mL    OUT:    Voided (mL): 300 mL  Total OUT: 300 mL    Total NET: -300 mL          LABS:                          16.1   11.21 )-----------( 207      ( 06 Sep 2023 05:58 )             48.9         05 Sep 2023 05:44    140    |  106    |  24     ----------------------------<  128    4.4     |  23     |  1.4      Ca    9.2        05 Sep 2023 05:44                                               PTT - ( 05 Sep 2023 19:00 )  PTT:63.8 sec                                       Urinalysis Basic - ( 05 Sep 2023 05:44 )    Color: x / Appearance: x / SG: x / pH: x  Gluc: 128 mg/dL / Ketone: x  / Bili: x / Urobili: x   Blood: x / Protein: x / Nitrite: x   Leuk Esterase: x / RBC: x / WBC x   Sq Epi: x / Non Sq Epi: x / Bacteria: x        Lactate, Blood: 2.0 mmol/L (09-03-23 @ 18:52)  Lactate, Blood: 2.9 mmol/L (09-03-23 @ 15:15)                                                          Culture - Blood (collected 03 Sep 2023 12:50)  Source: .Blood Blood  Preliminary Report (06 Sep 2023 01:01):    No growth at 48 Hours    Culture - Blood (collected 03 Sep 2023 12:50)  Source: .Blood Blood  Preliminary Report (06 Sep 2023 01:01):    No growth at 48 Hours                                                                                           MEDICATIONS  (STANDING):  aMIOdarone    Tablet 200 milliGRAM(s) Oral two times a day  atorvastatin 80 milliGRAM(s) Oral at bedtime  chlorhexidine 2% Cloths 1 Application(s) Topical <User Schedule>  clopidogrel Tablet 75 milliGRAM(s) Oral daily  dextrose 5%. 1000 milliLiter(s) (50 mL/Hr) IV Continuous <Continuous>  dextrose 5%. 1000 milliLiter(s) (100 mL/Hr) IV Continuous <Continuous>  dextrose 50% Injectable 25 Gram(s) IV Push once  dextrose 50% Injectable 12.5 Gram(s) IV Push once  dextrose 50% Injectable 25 Gram(s) IV Push once  fenofibrate Tablet 145 milliGRAM(s) Oral daily  furosemide    Tablet 40 milliGRAM(s) Oral daily  glucagon  Injectable 1 milliGRAM(s) IntraMuscular once  heparin  Infusion 1400 Unit(s)/Hr (16 mL/Hr) IV Continuous <Continuous>  insulin lispro (ADMELOG) corrective regimen sliding scale   SubCutaneous three times a day before meals  melatonin 5 milliGRAM(s) Oral at bedtime  metoprolol tartrate 100 milliGRAM(s) Oral two times a day  nicotine - 21 mG/24Hr(s) Patch 1 Patch Transdermal daily  sacubitril 24 mG/valsartan 26 mG 1 Tablet(s) Oral two times a day    MEDICATIONS  (PRN):  dextrose Oral Gel 15 Gram(s) Oral once PRN Blood Glucose LESS THAN 70 milliGRAM(s)/deciliter  zolpidem 5 milliGRAM(s) Oral at bedtime PRN Insomnia          Xrays:  TLC:  OG:  ET tube:                                                                                       ECHO:  CAM ICU:

## 2023-09-06 NOTE — CHART NOTE - NSCHARTNOTEFT_GEN_A_CORE
ICU Transfer note    Transfer from: ICU  Transfer to: Telemetry     H+P:   69 yo male with hx HTN, IDDM, HLD, active smoker (+1PPD), CAD s/p PCI, A-Fib on Eliquis, HFrEF (30%) s/p AICD p/w expressive aphasia, right sided weakness and difficulty getting up from the ground since yesterday morning.  Last known normal was approximately 9/1 evening, pt is AAOx3, conversational and ambulates independently at baseline as per spouse. According to spouse, pt compliant with his medications. She denies any complaints of fevers, chest pain, sob, recent illness. History and ROS limited at that time 2/2 pt with speech difficulty, and spouse is poor historian. Suspected that patient was noncompliant with medications     In the ED, Vitals: T(F): 99.4,  HR: 145 A-Fib w/ RVR w/ runs of SVT started on Amio gtt. Cardio called, agree with plan, will follow. AICD was interrogated per ED, consistent with A-Fib w/ RVR and SVT. BP: 179/76, RR: 30, SpO2: 96% on room air. Labs showed WBC 22k w/ NP, Hgb 19, Cr 1.6 baseline 1.4, Lactate 6.3 pH wnl, troponin 0.05. UA negative. Stroke code on arrival. Imaging revealed large (96 mL) volume of decreased cerebral blood flow within the left MCA territory, concerning for ischemia. No core infarct detected. No acute intervention. Being admitted for suspected acute stroke/ neurochecks.     ICU course:  - In ICU, patient                   ASSESSMENT & PLAN:         For Follow-Up:          Vital Signs Last 24 Hrs  T(C): 36.7 (06 Sep 2023 07:01), Max: 36.8 (05 Sep 2023 23:25)  T(F): 98 (06 Sep 2023 07:01), Max: 98.2 (05 Sep 2023 23:25)  HR: 91 (06 Sep 2023 10:58) (91 - 121)  BP: 141/75 (06 Sep 2023 10:58) (111/76 - 147/93)  BP(mean): 94 (06 Sep 2023 10:58) (80 - 115)  RR: 20 (06 Sep 2023 11:12) (20 - 31)  SpO2: 96% (06 Sep 2023 11:12) (93% - 96%)    Parameters below as of 06 Sep 2023 10:58  Patient On (Oxygen Delivery Method): nasal cannula  O2 Flow (L/min): 2    I&O's Summary    05 Sep 2023 07:01  -  06 Sep 2023 07:00  --------------------------------------------------------  IN: 1108 mL / OUT: 2400 mL / NET: -1292 mL    06 Sep 2023 07:01  -  06 Sep 2023 12:41  --------------------------------------------------------  IN: 258 mL / OUT: 300 mL / NET: -42 mL          MEDICATIONS  (STANDING):  aMIOdarone    Tablet 200 milliGRAM(s) Oral two times a day  atorvastatin 80 milliGRAM(s) Oral at bedtime  chlorhexidine 2% Cloths 1 Application(s) Topical <User Schedule>  clopidogrel Tablet 75 milliGRAM(s) Oral daily  dextrose 5%. 1000 milliLiter(s) (50 mL/Hr) IV Continuous <Continuous>  dextrose 5%. 1000 milliLiter(s) (100 mL/Hr) IV Continuous <Continuous>  dextrose 50% Injectable 25 Gram(s) IV Push once  dextrose 50% Injectable 12.5 Gram(s) IV Push once  dextrose 50% Injectable 25 Gram(s) IV Push once  fenofibrate Tablet 145 milliGRAM(s) Oral daily  furosemide    Tablet 40 milliGRAM(s) Oral daily  glucagon  Injectable 1 milliGRAM(s) IntraMuscular once  heparin  Infusion 1400 Unit(s)/Hr (16 mL/Hr) IV Continuous <Continuous>  insulin lispro (ADMELOG) corrective regimen sliding scale   SubCutaneous three times a day before meals  melatonin 5 milliGRAM(s) Oral at bedtime  metoprolol tartrate 100 milliGRAM(s) Oral two times a day  nicotine - 21 mG/24Hr(s) Patch 1 Patch Transdermal daily  sacubitril 24 mG/valsartan 26 mG 1 Tablet(s) Oral two times a day    MEDICATIONS  (PRN):  dextrose Oral Gel 15 Gram(s) Oral once PRN Blood Glucose LESS THAN 70 milliGRAM(s)/deciliter  zolpidem 5 milliGRAM(s) Oral at bedtime PRN Insomnia        LABS                                            16.1                  Neurophils% (auto):   69.7   (09-06 @ 05:58):    11.21)-----------(207          Lymphocytes% (auto):  17.3                                          48.9                   Eosinphils% (auto):   2.9      Manual%: Neutrophils x    ; Lymphocytes x    ; Eosinophils x    ; Bands%: x    ; Blasts x                                    137    |  100    |  23                  Calcium: 8.9   / iCa: x      (09-06 @ 05:58)    ----------------------------<  141       Magnesium: 2.3                              4.6     |  25     |  1.3              Phosphorous: 3.2      TPro  5.8    /  Alb  3.6    /  TBili  0.9    /  DBili  x      /  AST  39     /  ALT  40     /  AlkPhos  40     06 Sep 2023 05:58    ( 09-06 @ 05:58 )   PT: 14.70 sec;   INR: 1.28 ratio  aPTT: 61.0 sec ICU Transfer note    Transfer from: ICU  Transfer to: Telemetry     H+P:   67 yo male with hx HTN, IDDM, HLD, active smoker (+1PPD), CAD s/p PCI, A-Fib on Eliquis, HFrEF (30%) s/p AICD p/w expressive aphasia, right sided weakness and difficulty getting up from the ground since yesterday morning.  Last known normal was approximately 9/1 evening, pt is AAOx3, conversational and ambulates independently at baseline as per spouse. According to spouse, pt compliant with his medications. She denies any complaints of fevers, chest pain, sob, recent illness. History and ROS limited at that time 2/2 pt with speech difficulty, and spouse is poor historian. Suspected that patient was noncompliant with medications     In the ED, Vitals: T(F): 99.4,  HR: 145 A-Fib w/ RVR w/ runs of SVT started on Amio gtt. Cardio called, agree with plan, will follow. AICD was interrogated per ED, consistent with A-Fib w/ RVR and SVT. BP: 179/76, RR: 30, SpO2: 96% on room air. Labs showed WBC 22k w/ NP, Hgb 19, Cr 1.6 baseline 1.4, Lactate 6.3 pH wnl, troponin 0.05. UA negative. Stroke code on arrival. Imaging revealed large (96 mL) volume of decreased cerebral blood flow within the left MCA territory, concerning for ischemia. No core infarct detected. No acute intervention. Being admitted for suspected acute stroke/ neurochecks.     ICU course:  - In ICU, patient mental status and neurological exam improved. Still has some slurred speech/ expressive aphasia. Speech and swallow cleared for pureed diet. PT/OT working with patient. per Neuro ok to continue with AC, currently on plavix and heparin gtt (goal PTT < 65)                   ASSESSMENT & PLAN:         For Follow-Up:          Vital Signs Last 24 Hrs  T(C): 36.7 (06 Sep 2023 07:01), Max: 36.8 (05 Sep 2023 23:25)  T(F): 98 (06 Sep 2023 07:01), Max: 98.2 (05 Sep 2023 23:25)  HR: 91 (06 Sep 2023 10:58) (91 - 121)  BP: 141/75 (06 Sep 2023 10:58) (111/76 - 147/93)  BP(mean): 94 (06 Sep 2023 10:58) (80 - 115)  RR: 20 (06 Sep 2023 11:12) (20 - 31)  SpO2: 96% (06 Sep 2023 11:12) (93% - 96%)    Parameters below as of 06 Sep 2023 10:58  Patient On (Oxygen Delivery Method): nasal cannula  O2 Flow (L/min): 2    I&O's Summary    05 Sep 2023 07:01  -  06 Sep 2023 07:00  --------------------------------------------------------  IN: 1108 mL / OUT: 2400 mL / NET: -1292 mL    06 Sep 2023 07:01  -  06 Sep 2023 12:41  --------------------------------------------------------  IN: 258 mL / OUT: 300 mL / NET: -42 mL          MEDICATIONS  (STANDING):  aMIOdarone    Tablet 200 milliGRAM(s) Oral two times a day  atorvastatin 80 milliGRAM(s) Oral at bedtime  chlorhexidine 2% Cloths 1 Application(s) Topical <User Schedule>  clopidogrel Tablet 75 milliGRAM(s) Oral daily  dextrose 5%. 1000 milliLiter(s) (50 mL/Hr) IV Continuous <Continuous>  dextrose 5%. 1000 milliLiter(s) (100 mL/Hr) IV Continuous <Continuous>  dextrose 50% Injectable 25 Gram(s) IV Push once  dextrose 50% Injectable 12.5 Gram(s) IV Push once  dextrose 50% Injectable 25 Gram(s) IV Push once  fenofibrate Tablet 145 milliGRAM(s) Oral daily  furosemide    Tablet 40 milliGRAM(s) Oral daily  glucagon  Injectable 1 milliGRAM(s) IntraMuscular once  heparin  Infusion 1400 Unit(s)/Hr (16 mL/Hr) IV Continuous <Continuous>  insulin lispro (ADMELOG) corrective regimen sliding scale   SubCutaneous three times a day before meals  melatonin 5 milliGRAM(s) Oral at bedtime  metoprolol tartrate 100 milliGRAM(s) Oral two times a day  nicotine - 21 mG/24Hr(s) Patch 1 Patch Transdermal daily  sacubitril 24 mG/valsartan 26 mG 1 Tablet(s) Oral two times a day    MEDICATIONS  (PRN):  dextrose Oral Gel 15 Gram(s) Oral once PRN Blood Glucose LESS THAN 70 milliGRAM(s)/deciliter  zolpidem 5 milliGRAM(s) Oral at bedtime PRN Insomnia        LABS                                            16.1                  Neurophils% (auto):   69.7   (09-06 @ 05:58):    11.21)-----------(207          Lymphocytes% (auto):  17.3                                          48.9                   Eosinphils% (auto):   2.9      Manual%: Neutrophils x    ; Lymphocytes x    ; Eosinophils x    ; Bands%: x    ; Blasts x                                    137    |  100    |  23                  Calcium: 8.9   / iCa: x      (09-06 @ 05:58)    ----------------------------<  141       Magnesium: 2.3                              4.6     |  25     |  1.3              Phosphorous: 3.2      TPro  5.8    /  Alb  3.6    /  TBili  0.9    /  DBili  x      /  AST  39     /  ALT  40     /  AlkPhos  40     06 Sep 2023 05:58    ( 09-06 @ 05:58 )   PT: 14.70 sec;   INR: 1.28 ratio  aPTT: 61.0 sec ICU Transfer note    Transfer from: ICU  Transfer to: Telemetry     H+P:   67 yo male with hx HTN, IDDM, HLD, active smoker (+1PPD), CAD s/p PCI, A-Fib on Eliquis, HFrEF (30%) s/p AICD p/w expressive aphasia, right sided weakness and difficulty getting up from the ground since yesterday morning.  Last known normal was approximately 9/1 evening, pt is AAOx3, conversational and ambulates independently at baseline as per spouse. According to spouse, pt compliant with his medications. She denies any complaints of fevers, chest pain, sob, recent illness. History and ROS limited at that time 2/2 pt with speech difficulty, and spouse is poor historian. Suspected that patient was noncompliant with medications     In the ED, Vitals: T(F): 99.4,  HR: 145 A-Fib w/ RVR w/ runs of SVT started on Amio gtt. Cardio called, agree with plan, will follow. AICD was interrogated per ED, consistent with A-Fib w/ RVR and SVT. BP: 179/76, RR: 30, SpO2: 96% on room air. Labs showed WBC 22k w/ NP, Hgb 19, Cr 1.6 baseline 1.4, Lactate 6.3 pH wnl, troponin 0.05. UA negative. Stroke code on arrival. Imaging revealed large (96 mL) volume of decreased cerebral blood flow within the left MCA territory, concerning for ischemia. No core infarct detected. No acute intervention. Being admitted for suspected acute stroke/ neurochecks.     ICU course:  - In ICU, patient mental status and neurological exam improved. Still has some slurred speech/ expressive aphasia. Speech and swallow cleared for pureed diet. PT/OT working with patient. per Neuro ok to continue with AC, currently on plavix and heparin gtt (goal PTT < 65 as per neuro). He is still pending MRI (MRI team to coordinate with [employer] [facility] for BiV AICD for tomorrow 9/7). For his A.fib, patient completed amiodarone load and is now on amiodarone 200mg BID PO and metoprolol tartrate 100mg BID. HR has improved to 90s. Per Dr. Mak, can decrease to amiodarone 200mg qd in one week and he can follow up with his cardiologist Dr. Silva outpatient. Per neurology, he is stable for downgrade to telemetry.       Follow up:  - MRI (per team, will go tomorrow on 9/7)  - Neurology plan  - PT/OT  - PTTs (patient specific, PTT goal <65), can switch back to eliquis on discharge/ after MRI if not intervention planned       ASSESSMENT & PLAN:     67 yo male with a medical hx of HTN, DM, HLD, active smoker (+1PPD), CAD s/p PCI, paroxysmal A-Fib, chronic HFrEF (30%) s/p AICD admitted to unit with  expressive aphasia associated with  right sided weakness and difficulty getting up from the ground, admitted for acute stroke/ Afib with RVR       #Suspected left MCA ischemic stroke  -CT head: no acute infarct   -CT Perfusion: Large (96 mL) volume of decreased cerebral blood flow within the left MCA   territory, concerning for ischemia  -Continue Plavix, heparin gtt and statin. Ptt goal < 65   - q8 neurochecks as per neuro   - MRI ordered > pacemaker is MR conditional [employer] [id number] Claria MRI   - PT/OT/ST  - Speech and swallow >> pureed diet   - A1c, lipid profile    #A-Fib with RVR  #Episodes of SVTs  - Cardiology interrogated BiV ICD on 9/3: showed atrial tachy/Afib w/rvr, possible SVT?  - s/p amiodarone gtt, now on amiodarone 200mg BID starting 9/5/23   - c/w metoprolol tartrate 100mg BID   - Ok for AC as per neuro > heparin gtt for now with PTT goal < 65 as per neuro   - HR low 100s   -cardio on board > can decrease amiodarone 200mg qd within 1 week  - RVR likely 2/2 to noncompliance, can resume eliquis on discharge     #Leukocytosis w/ elevated lactate: resolving   -UA negative  -Chest Xray with infiltrates likely edema improving on lasix   - Blood culture negative   - MRSA pending   - D/C vanc and cefepime   - monitor off antibiotics     #CAD s/p PCI   - stable, EKG no ischemic changes, elevated troponin likely demand ischemia trend, cont Plavix, statin    #HFrEF (30%) s/p AICD   - stable, cont Entresto, BB, amiodarone, plavix, lasix, heparin gtt   #HTN   - c/w meds as above   #HLD - statin as above    #IDDM   - f/u a1c  - on insulin at home   - patient just resumed PO diet pureed  - Sliding scale for now, if FS persistantly elevated and tolerating PO diet, can start standing lantus + lispro     #Active smoker (+1PPD) - encourage cessation; offer nicotine replacement    #Misc  DVT ppx- heparin gtt   Diet- pureed   Activity-IAT  Code- Full.                Vital Signs Last 24 Hrs  T(C): 36.7 (06 Sep 2023 07:01), Max: 36.8 (05 Sep 2023 23:25)  T(F): 98 (06 Sep 2023 07:01), Max: 98.2 (05 Sep 2023 23:25)  HR: 91 (06 Sep 2023 10:58) (91 - 121)  BP: 141/75 (06 Sep 2023 10:58) (111/76 - 147/93)  BP(mean): 94 (06 Sep 2023 10:58) (80 - 115)  RR: 20 (06 Sep 2023 11:12) (20 - 31)  SpO2: 96% (06 Sep 2023 11:12) (93% - 96%)    Parameters below as of 06 Sep 2023 10:58  Patient On (Oxygen Delivery Method): nasal cannula  O2 Flow (L/min): 2    I&O's Summary    05 Sep 2023 07:01  -  06 Sep 2023 07:00  --------------------------------------------------------  IN: 1108 mL / OUT: 2400 mL / NET: -1292 mL    06 Sep 2023 07:01  -  06 Sep 2023 12:41  --------------------------------------------------------  IN: 258 mL / OUT: 300 mL / NET: -42 mL          MEDICATIONS  (STANDING):  aMIOdarone    Tablet 200 milliGRAM(s) Oral two times a day  atorvastatin 80 milliGRAM(s) Oral at bedtime  chlorhexidine 2% Cloths 1 Application(s) Topical <User Schedule>  clopidogrel Tablet 75 milliGRAM(s) Oral daily  dextrose 5%. 1000 milliLiter(s) (50 mL/Hr) IV Continuous <Continuous>  dextrose 5%. 1000 milliLiter(s) (100 mL/Hr) IV Continuous <Continuous>  dextrose 50% Injectable 25 Gram(s) IV Push once  dextrose 50% Injectable 12.5 Gram(s) IV Push once  dextrose 50% Injectable 25 Gram(s) IV Push once  fenofibrate Tablet 145 milliGRAM(s) Oral daily  furosemide    Tablet 40 milliGRAM(s) Oral daily  glucagon  Injectable 1 milliGRAM(s) IntraMuscular once  heparin  Infusion 1400 Unit(s)/Hr (16 mL/Hr) IV Continuous <Continuous>  insulin lispro (ADMELOG) corrective regimen sliding scale   SubCutaneous three times a day before meals  melatonin 5 milliGRAM(s) Oral at bedtime  metoprolol tartrate 100 milliGRAM(s) Oral two times a day  nicotine - 21 mG/24Hr(s) Patch 1 Patch Transdermal daily  sacubitril 24 mG/valsartan 26 mG 1 Tablet(s) Oral two times a day    MEDICATIONS  (PRN):  dextrose Oral Gel 15 Gram(s) Oral once PRN Blood Glucose LESS THAN 70 milliGRAM(s)/deciliter  zolpidem 5 milliGRAM(s) Oral at bedtime PRN Insomnia        LABS                                            16.1                  Neurophils% (auto):   69.7   (09-06 @ 05:58):    11.21)-----------(207          Lymphocytes% (auto):  17.3                                          48.9                   Eosinphils% (auto):   2.9      Manual%: Neutrophils x    ; Lymphocytes x    ; Eosinophils x    ; Bands%: x    ; Blasts x                                    137    |  100    |  23                  Calcium: 8.9   / iCa: x      (09-06 @ 05:58)    ----------------------------<  141       Magnesium: 2.3                              4.6     |  25     |  1.3              Phosphorous: 3.2      TPro  5.8    /  Alb  3.6    /  TBili  0.9    /  DBili  x      /  AST  39     /  ALT  40     /  AlkPhos  40     06 Sep 2023 05:58    ( 09-06 @ 05:58 )   PT: 14.70 sec;   INR: 1.28 ratio  aPTT: 61.0 sec

## 2023-09-06 NOTE — SWALLOW BEDSIDE ASSESSMENT ADULT - SWALLOW EVAL: FUNCTIONAL LEVEL AT TIME OF EVAL
A&Ox3 via verbal expression. Able to communicate basic wants/needs effectively and appropriately. Confused. On NC
A&Ox3 but difficulty expressing this d/t aphasia & apraxia of speech. On NC
A&Ox3 via verbal expression. Able to communicate basic wants/needs effectively and appropriately. On NC

## 2023-09-06 NOTE — PROGRESS NOTE ADULT - ASSESSMENT
69 yo RHM w/ h/o HTN, IDDM, HLD, active smoker (+1PPD), CAD s/p PCI, A-Fib on Eliquis w/ unclear compliance, HFrEF (30%) s/p AICD p/w expressive aphasia, right sided weakness and difficulty getting up from the ground much improved nearly back suspect TIA vs reperfusion small stroke possibly cardioembolic.      Recommendations:   - f/u routine EEG  - f/u MR brain no contrast - await cardiology for defibrillator management  - routine neuro checks  - continue telemetry for now  - -180  - management of afib per ICU/cardiology  - continue AC - ok to switch to NOAC if need by cardiology  - continue Plavix for h/o stents  - pt/ot/pmr/s&s

## 2023-09-06 NOTE — PROGRESS NOTE ADULT - SUBJECTIVE AND OBJECTIVE BOX
TOSINFRANCESMONIQUE PATEL  68y, Male    LOS  3d    INTERVAL EVENTS/HPI  - No acute events overnight  - T(F): , Max: 98.2 (09-05-23 @ 23:25)  - Denies any worsening symptoms  - Tolerating medication  - WBC Count: 11.21 (09-06-23 @ 05:58)  WBC Count: 13.36 (09-05-23 @ 05:44)  - Creatinine: 1.3 (09-06-23 @ 05:58)  Creatinine: 1.4 (09-05-23 @ 05:44)     REVIEW OF SYSTEMS:  CONSTITUTIONAL: No fever or chills  HEENT: No sore throat  RESPIRATORY: No cough, no shortness of breath  CARDIOVASCULAR: No chest pain or palpitations  GASTROINTESTINAL: No abdominal or epigastric pain  GENITOURINARY: No dysuria  NEUROLOGICAL: No headache/dizziness  MSK: No joint pain, erythema, or swelling; no back pain  SKIN: No itching, rashes  All other ROS negative except noted above    Prior hospital charts reviewed [Yes]  Primary team notes reviewed [Yes]  Other consultant notes reviewed [Yes]    ANTIMICROBIALS:       OTHER MEDS: MEDICATIONS  (STANDING):  aMIOdarone    Tablet 200 two times a day  atorvastatin 80 at bedtime  clopidogrel Tablet 75 daily  dextrose 50% Injectable 25 once  dextrose 50% Injectable 12.5 once  dextrose 50% Injectable 25 once  dextrose Oral Gel 15 once PRN  fenofibrate Tablet 145 daily  furosemide    Tablet 40 daily  glucagon  Injectable 1 once  heparin  Infusion 1400 <Continuous>  insulin lispro (ADMELOG) corrective regimen sliding scale  three times a day before meals  melatonin 5 at bedtime  metoprolol tartrate 100 two times a day  sacubitril 24 mG/valsartan 26 mG 1 two times a day  zolpidem 5 at bedtime PRN      Vital Signs Last 24 Hrs  T(F): 97.9 (09-06-23 @ 15:00), Max: 100.6 (09-03-23 @ 17:16)    Vital Signs Last 24 Hrs  HR: 111 (09-06-23 @ 17:16) (91 - 121)  BP: 146/97 (09-06-23 @ 17:16) (113/66 - 150/85)  RR: 25 (09-06-23 @ 17:16)  SpO2: 95% (09-06-23 @ 17:16) (95% - 98%)  Wt(kg): --    EXAM:  GENERAL: NAD, lying in bed  HEAD: No head lesions  NECK: Supple, nontender to palpation; no JVD  CHEST/LUNG: Clear to auscultation bilaterally  HEART: S1 S2  ABDOMEN: Soft, nontender, nondistended  EXTREMITIES: No clubbing, cyanosis, or petal edema  NERVOUS SYSTEM: Alert and oriented to person. Speaking in full sentences.   MSK: No joint erythema, swelling or pain.  SKIN: No rashes or lesions, no superficial thrombophlebitis    Labs:                        16.1   11.21 )-----------( 207      ( 06 Sep 2023 05:58 )             48.9     09-06    137  |  100  |  23<H>  ----------------------------<  141<H>  4.6   |  25  |  1.3    Ca    8.9      06 Sep 2023 05:58  Phos  3.2     09-06  Mg     2.3     09-06    TPro  5.8<L>  /  Alb  3.6  /  TBili  0.9  /  DBili  x   /  AST  39  /  ALT  40  /  AlkPhos  40  09-06      WBC Trend:  WBC Count: 11.21 (09-06-23 @ 05:58)  WBC Count: 13.36 (09-05-23 @ 05:44)  WBC Count: 16.73 (09-04-23 @ 06:14)  WBC Count: 19.86 (09-03-23 @ 18:52)      Creatine Trend:  Creatinine: 1.3 (09-06)  Creatinine: 1.4 (09-05)  Creatinine: 1.5 (09-04)  Creatinine: 1.6 (09-03)      Liver Biochemical Testing Trend:  Alanine Aminotransferase (ALT/SGPT): 40 (09-06)  Alanine Aminotransferase (ALT/SGPT): 37 (09-05)  Alanine Aminotransferase (ALT/SGPT): 35 (09-04)  Alanine Aminotransferase (ALT/SGPT): 33 (09-03)  Alanine Aminotransferase (ALT/SGPT): 48 *H* (11-29)  Aspartate Aminotransferase (AST/SGOT): 39 (09-06-23 @ 05:58)  Aspartate Aminotransferase (AST/SGOT): 53 (09-05-23 @ 05:44)  Aspartate Aminotransferase (AST/SGOT): 67 (09-04-23 @ 06:14)  Aspartate Aminotransferase (AST/SGOT): 30 (09-03-23 @ 12:50)  Aspartate Aminotransferase (AST/SGOT): 43 (11-29-22 @ 14:46)  Bilirubin Total: 0.9 (09-06)  Bilirubin Direct: 0.4 (09-05)  Bilirubin Total: 1.0 (09-05)  Bilirubin Total: 0.7 (09-04)  Bilirubin Total: 0.8 (09-03)      Trend LDH      Urinalysis Basic - ( 06 Sep 2023 05:58 )    Color: x / Appearance: x / SG: x / pH: x  Gluc: 141 mg/dL / Ketone: x  / Bili: x / Urobili: x   Blood: x / Protein: x / Nitrite: x   Leuk Esterase: x / RBC: x / WBC x   Sq Epi: x / Non Sq Epi: x / Bacteria: x        MICROBIOLOGY:    Male    Culture - Blood (collected 03 Sep 2023 12:50)  Source: .Blood Blood  Preliminary Report:    No growth at 48 Hours    Culture - Blood (collected 03 Sep 2023 12:50)  Source: .Blood Blood  Preliminary Report:    No growth at 48 Hours      Procalcitonin, Serum: 0.15 (09-05)  Procalcitonin, Serum: 0.20 (09-04)    Lactate, Blood: 2.0 (09-03 @ 18:52)        RADIOLOGY & ADDITIONAL TESTS:  I have personally reviewed the relevant images.   CXR  Xray Chest 1 View- PORTABLE-Urgent:   ACC: [id number] EXAM:  XR CHEST PORTABLE URGENT 1V   ORDERED BY: KIKI KING     PROCEDURE DATE:  09/05/2023          INTERPRETATION:  Clinical History / Reason for exam: Shortness of breath    Comparison : Chest radiograph dated 9/3/2023.    Technique/Positioning: Frontal chest radiograph.    Findings:    Support devices: Left chest wall ICD.    Cardiac/mediastinum/hilum: Status post sternotomy. Cardiomegaly.   Unchanged.    Lung parenchyma/Pleura: Bilateral interstitial opacities are mildly   improved compared to 9/3/2023. Small bilateral pleural effusions are not   excluded. There is no pneumothorax.    Skeleton/soft tissues: Surgical clips are noted in the upper abdomen.    Impression:    Improved bilateral interstitial opacities.        --- End of Report ---          RIGO COWART MD; Resident Radiologist  This document has been electronically signed.  STEVE ARIAS MD; Attending Radiologist  This document has been electronically signed. Sep  5 2023 10:32AM (09-05-23 @ 09:13)      CT        WEIGHT  Weight (kg): 93.2 (09-03-23 @ 18:00)  Creatinine: 1.3 mg/dL (09-06-23 @ 05:58)      All available historical records have been reviewed

## 2023-09-06 NOTE — SWALLOW BEDSIDE ASSESSMENT ADULT - NS ASR SWALLOW FINDINGS DISCUS
Physician/Nursing/Patient
son and DIL at bedside/Physician/Nursing/Patient/Family
Physician/Nursing/Patient

## 2023-09-06 NOTE — PROGRESS NOTE ADULT - SUBJECTIVE AND OBJECTIVE BOX
Neurology Progress Note    Interval History:  Pt currently stable.  No new events.  Has not had recurrence of aphasia or R sided weakness.  still on heparin gtt.      HPI:  69 yo male with hx HTN, IDDM, HLD, active smoker (+1PPD), CAD s/p PCI, A-Fib on Eliquis, HFrEF (30%) s/p AICD p/w expressive aphasia, right sided weakness and difficulty getting up from the ground since yesterday morning.  Last known normal was approximately 9/1 evening, pt is AAOx3, conversational and ambulates independently at baseline as per spouse. According to spouse, pt compliant with his medications. She denies any complaints of fevers, chest pain, sob, recent illness. History and ROS limited at this time 2/2 pt with speech difficulty, and spouse is poor historian.     In the ED,   T(F): 99.4   HR: 145 A-Fib w/ RVR w/ runs of SVT started on Amio gtt. Cardio called, agree with plan, will follow. AICD was interrogated per ED, consistent with A-Fib w/ RVR and SVT.    BP: 179/76  RR: 30  SpO2: 96% on room air  Labs showed WBC 22k w/ NP, Hgb 19, Cr 1.6 baseline 1.4, Lactate 6.3 pH wnl, troponin 0.05. UA negative.   Stroke code on arrival. Imaging revealed large (96 mL) volume of decreased cerebral blood flow within the left MCA   territory, concerning for ischemia. No core infarct detected. No acute intervention.  Being admitted to MICU for q1h neuro checks.    (03 Sep 2023 16:17)      PAST MEDICAL & SURGICAL HISTORY:  CHF (congestive heart failure)    Diabetes    CAD (coronary artery disease)    Chronic obstructive pulmonary disease (COPD)    HTN (hypertension)    Stented coronary artery    Dyslipidemia    GERD (gastroesophageal reflux disease)    Afib    H/O heart artery stent    Heart valve replaced  aortic    History of Nissen fundoplication    Medications:  aMIOdarone    Tablet 200 milliGRAM(s) Oral two times a day  atorvastatin 80 milliGRAM(s) Oral at bedtime  chlorhexidine 2% Cloths 1 Application(s) Topical <User Schedule>  clopidogrel Tablet 75 milliGRAM(s) Oral daily  dextrose 5%. 1000 milliLiter(s) IV Continuous <Continuous>  dextrose 5%. 1000 milliLiter(s) IV Continuous <Continuous>  dextrose 50% Injectable 25 Gram(s) IV Push once  dextrose 50% Injectable 25 Gram(s) IV Push once  dextrose 50% Injectable 12.5 Gram(s) IV Push once  dextrose Oral Gel 15 Gram(s) Oral once PRN  fenofibrate Tablet 145 milliGRAM(s) Oral daily  furosemide    Tablet 40 milliGRAM(s) Oral daily  glucagon  Injectable 1 milliGRAM(s) IntraMuscular once  heparin  Infusion 1400 Unit(s)/Hr IV Continuous <Continuous>  insulin lispro (ADMELOG) corrective regimen sliding scale   SubCutaneous three times a day before meals  melatonin 5 milliGRAM(s) Oral at bedtime  metoprolol tartrate 100 milliGRAM(s) Oral two times a day  nicotine - 21 mG/24Hr(s) Patch 1 Patch Transdermal daily  sacubitril 24 mG/valsartan 26 mG 1 Tablet(s) Oral two times a day  zolpidem 5 milliGRAM(s) Oral at bedtime PRN      Vital Signs Last 24 Hrs  T(C): 36.7 (06 Sep 2023 07:01), Max: 36.8 (05 Sep 2023 23:25)  T(F): 98 (06 Sep 2023 07:01), Max: 98.2 (05 Sep 2023 23:25)  HR: 91 (06 Sep 2023 10:58) (91 - 121)  BP: 141/75 (06 Sep 2023 10:58) (111/76 - 147/93)  BP(mean): 94 (06 Sep 2023 10:58) (80 - 115)  RR: 20 (06 Sep 2023 11:12) (20 - 31)  SpO2: 96% (06 Sep 2023 11:12) (93% - 96%)    Parameters below as of 06 Sep 2023 10:58  Patient On (Oxygen Delivery Method): nasal cannula  O2 Flow (L/min): 2      Neurological Exam:   Mental status: Awake, alert and oriented x3.  Recent and remote memory intact.  Naming, repetition and comprehension intact.  Attention/concentration intact.  Edentulous dysarthria, no aphasia.  Fund of knowledge appropriate.    Cranial nerves: Pupils equally round and reactive to light, visual fields full, no nystagmus, extraocular muscles intact, V1 through V3 intact bilaterally and symmetric, face symmetric, hearing intact to finger rub, palate elevation symmetric, tongue was midline.  Motor:  MRC grading 5/5 b/l UE/LE.   strength 5/5.  Normal tone and bulk.  No abnormal movements.    Sensation: Intact to light touch, proprioception, and pinprick.   Coordination: No dysmetria on finger-to-nose and heel-to-shin.  No dysdiadokinesia.  Reflexes: 2+ in bilateral UE/LE, downgoing toes bilaterally. (-) Hernandes.    NIHSS 0    Labs:  CBC Full  -  ( 06 Sep 2023 05:58 )  WBC Count : 11.21 K/uL  RBC Count : 5.43 M/uL  Hemoglobin : 16.1 g/dL  Hematocrit : 48.9 %  Platelet Count - Automated : 207 K/uL  Mean Cell Volume : 90.1 fL  Mean Cell Hemoglobin : 29.7 pg  Mean Cell Hemoglobin Concentration : 32.9 g/dL  Auto Neutrophil # : 7.81 K/uL  Auto Lymphocyte # : 1.94 K/uL  Auto Monocyte # : 1.00 K/uL  Auto Eosinophil # : 0.32 K/uL  Auto Basophil # : 0.08 K/uL  Auto Neutrophil % : 69.7 %  Auto Lymphocyte % : 17.3 %  Auto Monocyte % : 8.9 %  Auto Eosinophil % : 2.9 %  Auto Basophil % : 0.7 %    09-06    137  |  100  |  23<H>  ----------------------------<  141<H>  4.6   |  25  |  1.3    Ca    8.9      06 Sep 2023 05:58  Phos  3.2     09-06  Mg     2.3     09-06    TPro  5.8<L>  /  Alb  3.6  /  TBili  0.9  /  DBili  x   /  AST  39  /  ALT  40  /  AlkPhos  40  09-06    LIVER FUNCTIONS - ( 06 Sep 2023 05:58 )  Alb: 3.6 g/dL / Pro: 5.8 g/dL / ALK PHOS: 40 U/L / ALT: 40 U/L / AST: 39 U/L / GGT: x           PT/INR - ( 06 Sep 2023 05:58 )   PT: 14.70 sec;   INR: 1.28 ratio         PTT - ( 06 Sep 2023 05:58 )  PTT:61.0 sec  Urinalysis Basic - ( 06 Sep 2023 05:58 )    Color: x / Appearance: x / SG: x / pH: x  Gluc: 141 mg/dL / Ketone: x  / Bili: x / Urobili: x   Blood: x / Protein: x / Nitrite: x   Leuk Esterase: x / RBC: x / WBC x   Sq Epi: x / Non Sq Epi: x / Bacteria: x    < from: TTE Echo Complete w/o Contrast w/ Doppler (09.04.23 @ 09:07) >  Summary:   1. LV Ejection Fraction by Beck's Method with a biplane EF of 38 %.   2. Mild left ventricular hypertrophy.   3. Mildly enlarged left atrium.   4. There is no evidence of pericardial effusion.   5. Mild mitral annular calcification.   6. Bioprosthesis in the aortic position.    < end of copied text >

## 2023-09-06 NOTE — PROGRESS NOTE ADULT - ASSESSMENT
69 yo male with a medical hx of HTN, DM, HLD, active smoker (+1PPD), CAD s/p PCI, paroxysmal A-Fib, chronic HFrEF (30%) s/p AICD admitted to unit with  expressive aphasia associated with  right sided weakness and difficulty getting up from the ground, In ED had rapid HR and given IV Amiodarone    expressive aphasia and weakness r/o stroke / rapid afib     - Plavix, Lipitor   - IV heparin - check ptt and adjust   - amiodarone, metoprolol, Lasix   - rate now controlled   - cardiology follow up   - MRI brain   - may downgrade to 3S tele   - PT/OT   - antibiotics DC'd

## 2023-09-06 NOTE — SWALLOW BEDSIDE ASSESSMENT ADULT - SWALLOW EVAL: DIAGNOSIS
Concern for pharyngeal dysphagia given overt s/s aspiration at bedside, NIH 7, and incr WBC/concern for infection. + toleration observed without overt symptoms of penetration/aspiration for Puree/mildly thick
+ toleration observed without overt symptoms of penetration/aspiration for Regular/thins.
Concern for pharyngeal dysphagia given overt s/s aspiration at bedside, NIH 7, and incr WBC/concern for infection. + toleration observed without overt symptoms of penetration/aspiration for Puree/mildly thick

## 2023-09-06 NOTE — SWALLOW BEDSIDE ASSESSMENT ADULT - NS SPL SWALLOW CLINIC TRIAL FT
+ toleration observed without overt symptoms of penetration/aspiration for Regular/thins
Concern for aspiration
Concern for aspiration

## 2023-09-06 NOTE — SWALLOW BEDSIDE ASSESSMENT ADULT - SWALLOW EVAL: ORAL MUSCULATURE
+generalized weakness +left eye closed at times during evaluation
+generalized weakness
+generalized weakness +?oral apraxia during OME

## 2023-09-07 LAB
ALBUMIN SERPL ELPH-MCNC: 3.7 G/DL — SIGNIFICANT CHANGE UP (ref 3.5–5.2)
ALP SERPL-CCNC: 39 U/L — SIGNIFICANT CHANGE UP (ref 30–115)
ALT FLD-CCNC: 40 U/L — SIGNIFICANT CHANGE UP (ref 0–41)
ANION GAP SERPL CALC-SCNC: 12 MMOL/L — SIGNIFICANT CHANGE UP (ref 7–14)
APTT BLD: 65.2 SEC — HIGH (ref 27–39.2)
APTT BLD: 69.1 SEC — HIGH (ref 27–39.2)
APTT BLD: 70.3 SEC — CRITICAL HIGH (ref 27–39.2)
AST SERPL-CCNC: 30 U/L — SIGNIFICANT CHANGE UP (ref 0–41)
BASOPHILS # BLD AUTO: 0.09 K/UL — SIGNIFICANT CHANGE UP (ref 0–0.2)
BASOPHILS NFR BLD AUTO: 1 % — SIGNIFICANT CHANGE UP (ref 0–1)
BILIRUB SERPL-MCNC: 0.7 MG/DL — SIGNIFICANT CHANGE UP (ref 0.2–1.2)
BUN SERPL-MCNC: 23 MG/DL — HIGH (ref 10–20)
CALCIUM SERPL-MCNC: 9.2 MG/DL — SIGNIFICANT CHANGE UP (ref 8.4–10.5)
CHLORIDE SERPL-SCNC: 104 MMOL/L — SIGNIFICANT CHANGE UP (ref 98–110)
CO2 SERPL-SCNC: 24 MMOL/L — SIGNIFICANT CHANGE UP (ref 17–32)
CREAT SERPL-MCNC: 1.2 MG/DL — SIGNIFICANT CHANGE UP (ref 0.7–1.5)
EGFR: 66 ML/MIN/1.73M2 — SIGNIFICANT CHANGE UP
EOSINOPHIL # BLD AUTO: 0.4 K/UL — SIGNIFICANT CHANGE UP (ref 0–0.7)
EOSINOPHIL NFR BLD AUTO: 4.4 % — SIGNIFICANT CHANGE UP (ref 0–8)
GLUCOSE BLDC GLUCOMTR-MCNC: 175 MG/DL — HIGH (ref 70–99)
GLUCOSE BLDC GLUCOMTR-MCNC: 250 MG/DL — HIGH (ref 70–99)
GLUCOSE BLDC GLUCOMTR-MCNC: 262 MG/DL — HIGH (ref 70–99)
GLUCOSE BLDC GLUCOMTR-MCNC: 293 MG/DL — HIGH (ref 70–99)
GLUCOSE SERPL-MCNC: 145 MG/DL — HIGH (ref 70–99)
HCT VFR BLD CALC: 49.6 % — SIGNIFICANT CHANGE UP (ref 42–52)
HGB BLD-MCNC: 16.5 G/DL — SIGNIFICANT CHANGE UP (ref 14–18)
IMM GRANULOCYTES NFR BLD AUTO: 0.7 % — HIGH (ref 0.1–0.3)
LYMPHOCYTES # BLD AUTO: 2 K/UL — SIGNIFICANT CHANGE UP (ref 1.2–3.4)
LYMPHOCYTES # BLD AUTO: 21.8 % — SIGNIFICANT CHANGE UP (ref 20.5–51.1)
MAGNESIUM SERPL-MCNC: 2.2 MG/DL — SIGNIFICANT CHANGE UP (ref 1.8–2.4)
MCHC RBC-ENTMCNC: 29.6 PG — SIGNIFICANT CHANGE UP (ref 27–31)
MCHC RBC-ENTMCNC: 33.3 G/DL — SIGNIFICANT CHANGE UP (ref 32–37)
MCV RBC AUTO: 89 FL — SIGNIFICANT CHANGE UP (ref 80–94)
MONOCYTES # BLD AUTO: 0.9 K/UL — HIGH (ref 0.1–0.6)
MONOCYTES NFR BLD AUTO: 9.8 % — HIGH (ref 1.7–9.3)
NEUTROPHILS # BLD AUTO: 5.72 K/UL — SIGNIFICANT CHANGE UP (ref 1.4–6.5)
NEUTROPHILS NFR BLD AUTO: 62.3 % — SIGNIFICANT CHANGE UP (ref 42.2–75.2)
NRBC # BLD: 0 /100 WBCS — SIGNIFICANT CHANGE UP (ref 0–0)
PLATELET # BLD AUTO: 231 K/UL — SIGNIFICANT CHANGE UP (ref 130–400)
PMV BLD: 12 FL — HIGH (ref 7.4–10.4)
POTASSIUM SERPL-MCNC: 4.7 MMOL/L — SIGNIFICANT CHANGE UP (ref 3.5–5)
POTASSIUM SERPL-SCNC: 4.7 MMOL/L — SIGNIFICANT CHANGE UP (ref 3.5–5)
PROT SERPL-MCNC: 5.7 G/DL — LOW (ref 6–8)
RBC # BLD: 5.57 M/UL — SIGNIFICANT CHANGE UP (ref 4.7–6.1)
RBC # FLD: 13.1 % — SIGNIFICANT CHANGE UP (ref 11.5–14.5)
SODIUM SERPL-SCNC: 140 MMOL/L — SIGNIFICANT CHANGE UP (ref 135–146)
WBC # BLD: 9.17 K/UL — SIGNIFICANT CHANGE UP (ref 4.8–10.8)
WBC # FLD AUTO: 9.17 K/UL — SIGNIFICANT CHANGE UP (ref 4.8–10.8)

## 2023-09-07 PROCEDURE — 99232 SBSQ HOSP IP/OBS MODERATE 35: CPT

## 2023-09-07 RX ORDER — INSULIN LISPRO 100/ML
3 VIAL (ML) SUBCUTANEOUS ONCE
Refills: 0 | Status: COMPLETED | OUTPATIENT
Start: 2023-09-07 | End: 2023-09-07

## 2023-09-07 RX ORDER — AMIODARONE HYDROCHLORIDE 400 MG/1
200 TABLET ORAL DAILY
Refills: 0 | Status: DISCONTINUED | OUTPATIENT
Start: 2023-09-08 | End: 2023-09-09

## 2023-09-07 RX ORDER — INSULIN LISPRO 100/ML
3 VIAL (ML) SUBCUTANEOUS
Refills: 0 | Status: DISCONTINUED | OUTPATIENT
Start: 2023-09-07 | End: 2023-09-09

## 2023-09-07 RX ORDER — INSULIN GLARGINE 100 [IU]/ML
10 INJECTION, SOLUTION SUBCUTANEOUS AT BEDTIME
Refills: 0 | Status: DISCONTINUED | OUTPATIENT
Start: 2023-09-07 | End: 2023-09-09

## 2023-09-07 RX ADMIN — Medication 3 UNIT(S): at 22:12

## 2023-09-07 RX ADMIN — Medication 3: at 11:30

## 2023-09-07 RX ADMIN — ATORVASTATIN CALCIUM 80 MILLIGRAM(S): 80 TABLET, FILM COATED ORAL at 22:01

## 2023-09-07 RX ADMIN — Medication 2: at 16:48

## 2023-09-07 RX ADMIN — Medication 5 MILLIGRAM(S): at 22:01

## 2023-09-07 RX ADMIN — Medication 100 MILLIGRAM(S): at 05:27

## 2023-09-07 RX ADMIN — Medication 40 MILLIGRAM(S): at 05:26

## 2023-09-07 RX ADMIN — Medication 1: at 08:12

## 2023-09-07 RX ADMIN — Medication 1 PATCH: at 11:33

## 2023-09-07 RX ADMIN — Medication 1 PATCH: at 11:59

## 2023-09-07 RX ADMIN — Medication 100 MILLIGRAM(S): at 17:57

## 2023-09-07 RX ADMIN — Medication 145 MILLIGRAM(S): at 11:32

## 2023-09-07 RX ADMIN — Medication 3 UNIT(S): at 16:48

## 2023-09-07 RX ADMIN — INSULIN GLARGINE 10 UNIT(S): 100 INJECTION, SOLUTION SUBCUTANEOUS at 22:04

## 2023-09-07 RX ADMIN — AMIODARONE HYDROCHLORIDE 200 MILLIGRAM(S): 400 TABLET ORAL at 05:26

## 2023-09-07 RX ADMIN — CHLORHEXIDINE GLUCONATE 1 APPLICATION(S): 213 SOLUTION TOPICAL at 05:25

## 2023-09-07 RX ADMIN — CLOPIDOGREL BISULFATE 75 MILLIGRAM(S): 75 TABLET, FILM COATED ORAL at 11:56

## 2023-09-07 RX ADMIN — ZOLPIDEM TARTRATE 5 MILLIGRAM(S): 10 TABLET ORAL at 22:04

## 2023-09-07 RX ADMIN — SACUBITRIL AND VALSARTAN 1 TABLET(S): 24; 26 TABLET, FILM COATED ORAL at 17:57

## 2023-09-07 RX ADMIN — SACUBITRIL AND VALSARTAN 1 TABLET(S): 24; 26 TABLET, FILM COATED ORAL at 05:26

## 2023-09-07 RX ADMIN — Medication 1 PATCH: at 19:51

## 2023-09-07 RX ADMIN — Medication 1 PATCH: at 08:13

## 2023-09-07 NOTE — PROGRESS NOTE ADULT - SUBJECTIVE AND OBJECTIVE BOX
MONIQUE LYONS 68y Male  MRN#: [id number]   CODE STATUS: FULL CODE    Hospital Day: 4d    Pt is currently admitted with the primary diagnosis of slurred speech     SUBJECTIVE    Overnight events: no events overnight     Subjective complaints: he has no complaints this am. Speech significantly improved compared to admission. Planned for MRI today                                             ----------------------------------------------------------  OBJECTIVE  PAST MEDICAL & SURGICAL HISTORY  CHF (congestive heart failure)    Diabetes    CAD (coronary artery disease)    Chronic obstructive pulmonary disease (COPD)    HTN (hypertension)    Stented coronary artery    Dyslipidemia    GERD (gastroesophageal reflux disease)    Afib    H/O heart artery stent    Heart valve replaced  aortic    History of Nissen fundoplication                                              -----------------------------------------------------------  ALLERGIES:  sulfa drugs (Unknown)                                            ------------------------------------------------------------    HOME MEDICATIONS  Home Medications:  atorvastatin 40 mg oral tablet: 1 tab(s) orally once a day (at bedtime) (05 Feb 2020 17:41)  clopidogrel 75 mg oral tablet: 1 tab(s) orally once a day (05 Feb 2020 17:41)  Entresto 24 mg-26 mg oral tablet: 1 tab(s) orally 2 times a day (05 Feb 2020 17:41)  ezetimibe 10 mg oral tablet: 1 orally once a day (03 Sep 2023 16:41)  fenofibrate 145 mg oral tablet: 1 orally once a day (03 Sep 2023 16:41)  furosemide 40 mg oral tablet: 1 orally once a day (03 Sep 2023 16:41)  Lunesta 1 mg oral tablet: 1 orally once a day (at bedtime) (03 Sep 2023 16:41)  metoprolol succinate 200 mg oral capsule, extended release: 1 cap(s) orally once a day (05 Feb 2020 17:41)  NovoLOG 100 units/mL injectable solution: 8 unit(s) injectable once a day (05 Feb 2020 17:41)  OMEGA-3 ETHYL ESTERS 1 GM CAP: 1 cap(s) orally once a day (05 Feb 2020 17:41)  Tresiba 100 units/mL subcutaneous solution: 150 unit(s) subcutaneous once a day (05 Feb 2020 17:41)                           MEDICATIONS:  STANDING MEDICATIONS  aMIOdarone    Tablet 200 milliGRAM(s) Oral two times a day  atorvastatin 80 milliGRAM(s) Oral at bedtime  chlorhexidine 2% Cloths 1 Application(s) Topical <User Schedule>  clopidogrel Tablet 75 milliGRAM(s) Oral daily  dextrose 5%. 1000 milliLiter(s) IV Continuous <Continuous>  dextrose 5%. 1000 milliLiter(s) IV Continuous <Continuous>  dextrose 50% Injectable 25 Gram(s) IV Push once  dextrose 50% Injectable 12.5 Gram(s) IV Push once  dextrose 50% Injectable 25 Gram(s) IV Push once  fenofibrate Tablet 145 milliGRAM(s) Oral daily  furosemide    Tablet 40 milliGRAM(s) Oral daily  glucagon  Injectable 1 milliGRAM(s) IntraMuscular once  heparin  Infusion 1400 Unit(s)/Hr IV Continuous <Continuous>  insulin lispro (ADMELOG) corrective regimen sliding scale   SubCutaneous three times a day before meals  melatonin 5 milliGRAM(s) Oral at bedtime  metoprolol tartrate 100 milliGRAM(s) Oral two times a day  nicotine - 21 mG/24Hr(s) Patch 1 Patch Transdermal daily  sacubitril 24 mG/valsartan 26 mG 1 Tablet(s) Oral two times a day    PRN MEDICATIONS  dextrose Oral Gel 15 Gram(s) Oral once PRN  zolpidem 5 milliGRAM(s) Oral at bedtime PRN                                            ------------------------------------------------------------  VITAL SIGNS: Last 24 Hours  T(C): 35.2 (07 Sep 2023 07:01), Max: 36.6 (06 Sep 2023 15:00)  T(F): 95.4 (07 Sep 2023 07:01), Max: 97.9 (06 Sep 2023 15:00)  HR: 103 (07 Sep 2023 07:00) (91 - 111)  BP: 140/81 (07 Sep 2023 07:00) (106/71 - 159/88)  BP(mean): 105 (07 Sep 2023 07:00) (82 - 116)  RR: 18 (07 Sep 2023 07:01) (18 - 38)  SpO2: 97% (07 Sep 2023 07:00) (95% - 98%)      09-06-23 @ 07:01  -  09-07-23 @ 07:00  --------------------------------------------------------  IN: 353 mL / OUT: 1200 mL / NET: -847 mL    09-07-23 @ 07:01  -  09-07-23 @ 08:13  --------------------------------------------------------  IN: 240 mL / OUT: 0 mL / NET: 240 mL                                             --------------------------------------------------------------  LABS:                        16.5   9.17  )-----------( 231      ( 07 Sep 2023 05:14 )             49.6     09-07    140  |  104  |  23<H>  ----------------------------<  145<H>  4.7   |  24  |  1.2    Ca    9.2      07 Sep 2023 05:14  Phos  3.2     09-06  Mg     2.2     09-07    TPro  5.7<L>  /  Alb  3.7  /  TBili  0.7  /  DBili  x   /  AST  30  /  ALT  40  /  AlkPhos  39  09-07    PT/INR - ( 06 Sep 2023 05:58 )   PT: 14.70 sec;   INR: 1.28 ratio         PTT - ( 07 Sep 2023 05:14 )  PTT:65.2 sec  Urinalysis Basic - ( 07 Sep 2023 05:14 )    Color: x / Appearance: x / SG: x / pH: x  Gluc: 145 mg/dL / Ketone: x  / Bili: x / Urobili: x   Blood: x / Protein: x / Nitrite: x   Leuk Esterase: x / RBC: x / WBC x   Sq Epi: x / Non Sq Epi: x / Bacteria: x                                            -------------------------------------------------------------  RADIOLOGY:                                            --------------------------------------------------------------    PHYSICAL EXAM:  GENERAL: NAD,  AAOx3, slurred speech   HEENT:  Atraumatic, Normocephalic. conjunctiva and sclera clear, No JVD  PULMONARY: Clear to auscultation bilaterally; No wheeze  CARDIOVASCULAR: irregular rate and rhythm. No murmurs, rubs, or gallops  GASTROINTESTINAL: Soft, Nontender, Nondistended; Bowel sounds present  MUSCULOSKELETAL:  2+ Peripheral Pulses, No clubbing, cyanosis, or edema  NEUROLOGY: left arm slightly weaker then right   SKIN: No rashes or lesions                                           --------------------------------------------------------------    ASSESSMENT & PLAN  69 yo male with a medical hx of HTN, DM, HLD, active smoker (+1PPD), CAD s/p PCI, paroxysmal A-Fib, chronic HFrEF (30%) s/p AICD admitted to unit with  expressive aphasia associated with  right sided weakness and difficulty getting up from the ground, admitted for acute stroke/ Afib with RVR     #Suspected left MCA ischemic stroke  -CT head: no acute infarct   -CT Perfusion: Large (96 mL) volume of decreased cerebral blood flow within the left MCA   territory, concerning for ischemia  -Continue Plavix, heparin gtt and statin. Ptt goal < 65   - q8 neurochecks as per neuro   - MRI ordered > pacemaker is MR conditional Medtronic [id number] Claria MRI > planned for MRI today 9/7/23   - PT/OT/ST  - Speech and swallow >> diet advanced to regular   - A1c, lipid profile    #A-Fib with RVR  #Episodes of SVTs  - Cardiology interrogated BiV ICD on 9/3: showed atrial tachy/Afib w/rvr, possible SVT?  - s/p amiodarone gtt, now on amiodarone 200mg BID starting 9/5/23   - c/w metoprolol tartrate 100mg BID   - Ok for AC as per neuro > heparin gtt for now with PTT goal < 65 as per neuro   - HR low 100s   -cardio on board > can decrease amiodarone 200mg qd within 1 week  - RVR likely 2/2 to noncompliance, can resume eliquis on discharge     #Leukocytosis w/ elevated lactate: resolving   -UA negative  -Chest Xray with infiltrates likely edema improving on lasix   - Blood culture negative   - MRSA pending   - D/C vanc and cefepime   - monitor off antibiotics     #CAD s/p PCI   - stable, EKG no ischemic changes, elevated troponin likely demand ischemia trend, cont Plavix, statin    #HFrEF (30%) s/p AICD   - stable, cont Entresto, BB, amiodarone, plavix, lasix, heparin gtt   #HTN   - c/w meds as above   #HLD - statin as above    #IDDM   - a1c 6.1   - on insulin at home   - patient just resumed PO diet pureed  - Sliding scale for now, if FS persistantly elevated and tolerating PO diet, can start standing lantus + lispro     #Active smoker (+1PPD) - encourage cessation; offer nicotine replacement    #Misc  DVT ppx- heparin gtt   Diet- pureed   Activity-IAT  Code- Full.           MONIQUE LYONS 68y Male  MRN#: [id number]   CODE STATUS: FULL CODE    Hospital Day: 4d    Pt is currently admitted with the primary diagnosis of slurred speech     SUBJECTIVE    Overnight events: no events overnight     Subjective complaints: he has no complaints this am. Speech significantly improved compared to admission. Planned for MRI today                                             ----------------------------------------------------------  OBJECTIVE  PAST MEDICAL & SURGICAL HISTORY  CHF (congestive heart failure)    Diabetes    CAD (coronary artery disease)    Chronic obstructive pulmonary disease (COPD)    HTN (hypertension)    Stented coronary artery    Dyslipidemia    GERD (gastroesophageal reflux disease)    Afib    H/O heart artery stent    Heart valve replaced  aortic    History of Nissen fundoplication                                              -----------------------------------------------------------  ALLERGIES:  sulfa drugs (Unknown)                                            ------------------------------------------------------------    HOME MEDICATIONS  Home Medications:  atorvastatin 40 mg oral tablet: 1 tab(s) orally once a day (at bedtime) (05 Feb 2020 17:41)  clopidogrel 75 mg oral tablet: 1 tab(s) orally once a day (05 Feb 2020 17:41)  Entresto 24 mg-26 mg oral tablet: 1 tab(s) orally 2 times a day (05 Feb 2020 17:41)  ezetimibe 10 mg oral tablet: 1 orally once a day (03 Sep 2023 16:41)  fenofibrate 145 mg oral tablet: 1 orally once a day (03 Sep 2023 16:41)  furosemide 40 mg oral tablet: 1 orally once a day (03 Sep 2023 16:41)  Lunesta 1 mg oral tablet: 1 orally once a day (at bedtime) (03 Sep 2023 16:41)  metoprolol succinate 200 mg oral capsule, extended release: 1 cap(s) orally once a day (05 Feb 2020 17:41)  NovoLOG 100 units/mL injectable solution: 8 unit(s) injectable once a day (05 Feb 2020 17:41)  OMEGA-3 ETHYL ESTERS 1 GM CAP: 1 cap(s) orally once a day (05 Feb 2020 17:41)  Tresiba 100 units/mL subcutaneous solution: 150 unit(s) subcutaneous once a day (05 Feb 2020 17:41)                           MEDICATIONS:  STANDING MEDICATIONS  aMIOdarone    Tablet 200 milliGRAM(s) Oral two times a day  atorvastatin 80 milliGRAM(s) Oral at bedtime  chlorhexidine 2% Cloths 1 Application(s) Topical <User Schedule>  clopidogrel Tablet 75 milliGRAM(s) Oral daily  dextrose 5%. 1000 milliLiter(s) IV Continuous <Continuous>  dextrose 5%. 1000 milliLiter(s) IV Continuous <Continuous>  dextrose 50% Injectable 25 Gram(s) IV Push once  dextrose 50% Injectable 12.5 Gram(s) IV Push once  dextrose 50% Injectable 25 Gram(s) IV Push once  fenofibrate Tablet 145 milliGRAM(s) Oral daily  furosemide    Tablet 40 milliGRAM(s) Oral daily  glucagon  Injectable 1 milliGRAM(s) IntraMuscular once  heparin  Infusion 1400 Unit(s)/Hr IV Continuous <Continuous>  insulin lispro (ADMELOG) corrective regimen sliding scale   SubCutaneous three times a day before meals  melatonin 5 milliGRAM(s) Oral at bedtime  metoprolol tartrate 100 milliGRAM(s) Oral two times a day  nicotine - 21 mG/24Hr(s) Patch 1 Patch Transdermal daily  sacubitril 24 mG/valsartan 26 mG 1 Tablet(s) Oral two times a day    PRN MEDICATIONS  dextrose Oral Gel 15 Gram(s) Oral once PRN  zolpidem 5 milliGRAM(s) Oral at bedtime PRN                                            ------------------------------------------------------------  VITAL SIGNS: Last 24 Hours  T(C): 35.2 (07 Sep 2023 07:01), Max: 36.6 (06 Sep 2023 15:00)  T(F): 95.4 (07 Sep 2023 07:01), Max: 97.9 (06 Sep 2023 15:00)  HR: 103 (07 Sep 2023 07:00) (91 - 111)  BP: 140/81 (07 Sep 2023 07:00) (106/71 - 159/88)  BP(mean): 105 (07 Sep 2023 07:00) (82 - 116)  RR: 18 (07 Sep 2023 07:01) (18 - 38)  SpO2: 97% (07 Sep 2023 07:00) (95% - 98%)      09-06-23 @ 07:01  -  09-07-23 @ 07:00  --------------------------------------------------------  IN: 353 mL / OUT: 1200 mL / NET: -847 mL    09-07-23 @ 07:01  -  09-07-23 @ 08:13  --------------------------------------------------------  IN: 240 mL / OUT: 0 mL / NET: 240 mL                                             --------------------------------------------------------------  LABS:                        16.5   9.17  )-----------( 231      ( 07 Sep 2023 05:14 )             49.6     09-07    140  |  104  |  23<H>  ----------------------------<  145<H>  4.7   |  24  |  1.2    Ca    9.2      07 Sep 2023 05:14  Phos  3.2     09-06  Mg     2.2     09-07    TPro  5.7<L>  /  Alb  3.7  /  TBili  0.7  /  DBili  x   /  AST  30  /  ALT  40  /  AlkPhos  39  09-07    PT/INR - ( 06 Sep 2023 05:58 )   PT: 14.70 sec;   INR: 1.28 ratio         PTT - ( 07 Sep 2023 05:14 )  PTT:65.2 sec  Urinalysis Basic - ( 07 Sep 2023 05:14 )    Color: x / Appearance: x / SG: x / pH: x  Gluc: 145 mg/dL / Ketone: x  / Bili: x / Urobili: x   Blood: x / Protein: x / Nitrite: x   Leuk Esterase: x / RBC: x / WBC x   Sq Epi: x / Non Sq Epi: x / Bacteria: x                                            -------------------------------------------------------------  RADIOLOGY:                                            --------------------------------------------------------------    PHYSICAL EXAM:  GENERAL: NAD,  AAOx3, slurred speech   HEENT:  Atraumatic, Normocephalic. conjunctiva and sclera clear, No JVD  PULMONARY: Clear to auscultation bilaterally; No wheeze  CARDIOVASCULAR: irregular rate and rhythm. No murmurs, rubs, or gallops  GASTROINTESTINAL: Soft, Nontender, Nondistended; Bowel sounds present  MUSCULOSKELETAL:  2+ Peripheral Pulses, No clubbing, cyanosis, or edema  NEUROLOGY: left arm slightly weaker then right   SKIN: No rashes or lesions                                           --------------------------------------------------------------    ASSESSMENT & PLAN  67 yo male with a medical hx of HTN, DM, HLD, active smoker (+1PPD), CAD s/p PCI, paroxysmal A-Fib, chronic HFrEF (30%) s/p AICD admitted to unit with  expressive aphasia associated with  right sided weakness and difficulty getting up from the ground, admitted for acute stroke/ Afib with RVR     #Suspected left MCA ischemic stroke  -CT head: no acute infarct   -CT Perfusion: Large (96 mL) volume of decreased cerebral blood flow within the left MCA   territory, concerning for ischemia  -Continue Plavix, heparin gtt and statin. Ptt goal < 65   - q8 neurochecks as per neuro   - MRI ordered > pacemaker is MR conditional Medtronic [id number] Claria MRI > planned for MRI today 9/7/23   - PT/OT/ST  - Speech and swallow >> diet advanced to regular   - A1c 6.1, lipid profile: ldl 61     #A-Fib with RVR  #Episodes of SVTs  - Cardiology interrogated BiV ICD on 9/3: showed atrial tachy/Afib w/rvr, possible SVT?  - s/p amiodarone gtt, now on amiodarone 200mg BID starting 9/5/23   - c/w metoprolol tartrate 100mg BID   - Ok for AC as per neuro > heparin gtt for now with PTT goal < 65 as per neuro   - HR low 100s   - Cardio on board > can decrease amiodarone 200mg qd within 1 week  - RVR likely 2/2 to noncompliance, can resume eliquis on discharge     #Leukocytosis w/ elevated lactate: resolving   -UA negative  -Chest Xray with infiltrates likely edema improving on lasix   - Blood culture negative   - MRSA pending   - D/C vanc and cefepime   - monitor off antibiotics     #CAD s/p PCI   - stable, EKG no ischemic changes, elevated troponin likely demand ischemia trend, cont Plavix, statin    #HFrEF (30%) s/p AICD   - stable, cont Entresto, BB, amiodarone, plavix, lasix, heparin gtt   #HTN   - c/w meds as above   #HLD - statin as above    #IDDM   - a1c 6.1   - on insulin at home   - patient just resumed PO diet pureed  - Sliding scale for now, if FS persistently elevated and tolerating PO diet, can start standing lantus + lispro     #Active smoker (+1PPD) - encourage cessation; offer nicotine replacement    #Misc  DVT ppx- heparin gtt   Diet- pureed   Activity-IAT  Code- Full.

## 2023-09-07 NOTE — SPEECH LANGUAGE PATHOLOGY EVALUATION - SLP ORIENTATION
x4 (via yes/no questioning- not able to obtain verbally)
Oriented to person, place, time, and event.

## 2023-09-07 NOTE — PROGRESS NOTE ADULT - ASSESSMENT
69 yo male with a medical hx of HTN, DM, HLD, active smoker (+1PPD), CAD s/p PCI, paroxysmal A-Fib, chronic HFrEF (30%) s/p AICD admitted to unit with  expressive aphasia associated with  right sided weakness and difficulty getting up from the ground, In ED had rapid HR and given IV Amiodarone    expressive aphasia and weakness r/o stroke / rapid afib     - Plavix, Lipitor   - IV heparin - check ptt and adjust   - amiodarone, metoprolol, Lasix   - rate now controlled   - cardiology follow up   - MRI brain   - may downgrade to 3S tele   - PT/OT

## 2023-09-07 NOTE — SPEECH LANGUAGE PATHOLOGY EVALUATION - SLP AUTOMATIC SPEECH
w/ 1st item to get in set and then subsequent visual and verbal cuing. about 50% accurate w/ max cuing/intact/counting/days of the week/months of the year
intact

## 2023-09-07 NOTE — SPEECH LANGUAGE PATHOLOGY EVALUATION - SLP PERTINENT HISTORY OF CURRENT PROBLEM
69 yo male with hx HTN, IDDM, HLD, active smoker (+1PPD), CAD s/p PCI, A-Fib on Eliquis, HFrEF (30%) s/p AICD p/w expressive aphasia, right sided weakness and difficulty getting up from the ground since yesterday morning. Last known normal was approximately 9/1 evening, pt is AAOx3, conversational and ambulates independently at baseline as per spouse. According to spouse, pt compliant with his medications. Stroke code called in ED, CTH neg, perfusion showed L sided perfusion defect, no LVO on angiogram, moderate stenosis of R carotid bulb. On exam today patient is aphasic with R sided drift. Rapid afib on monitor. 9/4 NIHSS 7 as above
67 yo male with hx HTN, IDDM, HLD, active smoker (+1PPD), CAD s/p PCI, A-Fib on Eliquis, HFrEF (30%) s/p AICD p/w expressive aphasia, right sided weakness and difficulty getting up from the ground since yesterday morning. Last known normal was approximately 9/1 evening, pt is AAOx3, conversational and ambulates independently at baseline as per spouse. According to spouse, pt compliant with his medications. Stroke code called in ED, CTH neg, perfusion showed L sided perfusion defect, no LVO on angiogram, moderate stenosis of R carotid bulb. On exam today patient is aphasic with R sided drift. Rapid afib on monitor. 9/4 NIHSS 7 as above

## 2023-09-07 NOTE — PROGRESS NOTE ADULT - ASSESSMENT
IMPRESSION:   stroke   CHF more then PNA   AFIB   ?? LAW      PLAN:    CNS: follow neurology       HEENT: oral care     PULMONARY: cxr today   keep pox >92%v   need outpatient sleep study     CARDIOVASCULAR: cardiology eval   on lasix   keep is < os   BNP     GI: GI prophylaxis.  Feeding as per speech and swallow     RENAL:follow is and os   lytes     INFECTIOUS DISEASE:  off abx     HEMATOLOGICAL:  DVT prophylaxis.    ENDOCRINE:  Follow up FS.  Insulin protocol if needed.    disposition as per cardiology and neurology

## 2023-09-07 NOTE — SPEECH LANGUAGE PATHOLOGY EVALUATION - SPECIFY REASON(S)
evaluation of speech/lang/cognition per pts. report of improved speech output
evaluation of speech/lang/cognition

## 2023-09-07 NOTE — SPEECH LANGUAGE PATHOLOGY EVALUATION - SLP DIAGNOSIS
Pt presents with severe apraxia and aphasia with auditory comprehension as a relative strength. Automatic speech/responses were relatively intact (e.g. "God I can't speak" , "You are really going to ask me that?", 1-10 intact, automatic responses intact), however unable to say name, or answer any orientation questions verbally (A&Ox4 judged via yes/no questioning). Slow response to questions and to commands at times, staring blankly ahead.
Expressive/receptive language WFL per [place] aphasia screen.

## 2023-09-07 NOTE — SPEECH LANGUAGE PATHOLOGY EVALUATION - SLP VISUAL IMPAIRMENT EFFECTING FUNCTION
suspect some visual impairment/neglect? OT made aware
suspect some visual impairment/neglect? OT made aware

## 2023-09-07 NOTE — PROGRESS NOTE ADULT - SUBJECTIVE AND OBJECTIVE BOX
Patient is a 68y old  Male who presents with a chief complaint of Slurred speech      T(F): 96 (09-07-23 @ 15:00), Max: 97.9 (09-06-23 @ 23:16)  HR: 95 (09-07-23 @ 15:21)  BP: 124/73 (09-07-23 @ 15:21)  RR: 20 (09-07-23 @ 15:21)  SpO2: 96% (09-07-23 @ 15:21) (95% - 97%)    PHYSICAL EXAM:  GENERAL: NAD  HEAD:  Atraumatic, Normocephalic  EYES: EOMI, PERRLA, conjunctiva and sclera clear  NERVOUS SYSTEM:  Alert & Oriented X3, slurred speech   CHEST/LUNG: Clear to percussion bilaterally; No rales, rhonchi, wheezing, or rubs  HEART: Regular rate and rhythm; No murmurs, rubs, or gallops  ABDOMEN: Soft, Nontender, Nondistended; Bowel sounds present  EXTREMITIES:  2+ Peripheral Pulses, No clubbing, cyanosis, or edema    LABS  09-07    140  |  104  |  23<H>  ----------------------------<  145<H>  4.7   |  24  |  1.2    Ca    9.2      07 Sep 2023 05:14  Phos  3.2     09-06  Mg     2.2     09-07    TPro  5.7<L>  /  Alb  3.7  /  TBili  0.7  /  DBili  x   /  AST  30  /  ALT  40  /  AlkPhos  39  09-07                          16.5   9.17  )-----------( 231      ( 07 Sep 2023 05:14 )             49.6     PT/INR - ( 06 Sep 2023 05:58 )   PT: 14.70 sec;   INR: 1.28 ratio         PTT - ( 07 Sep 2023 05:14 )  PTT:65.2 sec      Culture Results:   No growth at 72 Hours (09-03-23)  Culture Results:   No growth at 72 Hours (09-03-23)    RADIOLOGY  < from: Xray Chest 1 View- PORTABLE-Urgent (Xray Chest 1 View- PORTABLE-Urgent .) (09.05.23 @ 09:13) >    Impression:    Improved bilateral interstitial opacities.      < end of copied text >  < from: CT Head No Cont (09.03.23 @ 21:49) >    IMPRESSION:  No evidence of acute transcortical infarct, acute intracranial   hemorrhage, or mass effect.    < end of copied text >    MEDICATIONS  (STANDING):  atorvastatin 80 milliGRAM(s) Oral at bedtime  chlorhexidine 2% Cloths 1 Application(s) Topical <User Schedule>  clopidogrel Tablet 75 milliGRAM(s) Oral daily  fenofibrate Tablet 145 milliGRAM(s) Oral daily  furosemide    Tablet 40 milliGRAM(s) Oral daily  heparin  Infusion 1400 Unit(s)/Hr (15 mL/Hr) IV Continuous <Continuous>  insulin glargine Injectable (LANTUS) 10 Unit(s) SubCutaneous at bedtime  insulin lispro (ADMELOG) corrective regimen sliding scale   SubCutaneous three times a day before meals  insulin lispro Injectable (ADMELOG) 3 Unit(s) SubCutaneous three times a day before meals  melatonin 5 milliGRAM(s) Oral at bedtime  metoprolol tartrate 100 milliGRAM(s) Oral two times a day  nicotine - 21 mG/24Hr(s) Patch 1 Patch Transdermal daily  sacubitril 24 mG/valsartan 26 mG 1 Tablet(s) Oral two times a day    MEDICATIONS  (PRN):  dextrose Oral Gel 15 Gram(s) Oral once PRN Blood Glucose LESS THAN 70 milliGRAM(s)/deciliter  zolpidem 5 milliGRAM(s) Oral at bedtime PRN Insomnia

## 2023-09-07 NOTE — PROGRESS NOTE ADULT - SUBJECTIVE AND OBJECTIVE BOX
Patient is a 68y old  Male who presents with a chief complaint of Stroke (05 Sep 2023 11:12)      Over Night Events:  Patient seen and examined.   on heparin for afib   on NC   no fever   off abx     ROS:  See HPI    PHYSICAL EXAM    ICU Vital Signs Last 24 Hrs  T(C): 35.2 (07 Sep 2023 07:01), Max: 36.6 (06 Sep 2023 15:00)  T(F): 95.4 (07 Sep 2023 07:01), Max: 97.9 (06 Sep 2023 15:00)  HR: 103 (07 Sep 2023 07:00) (91 - 111)  BP: 140/81 (07 Sep 2023 07:00) (106/71 - 159/88)  BP(mean): 105 (07 Sep 2023 07:00) (82 - 116)  ABP: --  ABP(mean): --  RR: 18 (07 Sep 2023 07:01) (18 - 38)  SpO2: 97% (07 Sep 2023 07:00) (95% - 98%)    O2 Parameters below as of 07 Sep 2023 07:00  Patient On (Oxygen Delivery Method): nasal cannula  O2 Flow (L/min): 2          General:awake   HEENT:   teodoro             Lymph Nodes: NO cervical LN   Lungs: Bilateral BS  Cardiovascular: Regular   Abdomen: Soft, Positive BS  Extremities: No clubbing   Skin: warm   Neurological: no focal   Musculoskeletal: move all ext     I&O's Detail    06 Sep 2023 07:01  -  07 Sep 2023 07:00  --------------------------------------------------------  IN:    Heparin: 143 mL    Oral Fluid: 210 mL  Total IN: 353 mL    OUT:    Voided (mL): 1200 mL  Total OUT: 1200 mL    Total NET: -847 mL          LABS:                          16.5   9.17  )-----------( 231      ( 07 Sep 2023 05:14 )             49.6         07 Sep 2023 05:14    140    |  104    |  23     ----------------------------<  145    4.7     |  24     |  1.2      Ca    9.2        07 Sep 2023 05:14  Phos  3.2       06 Sep 2023 05:58  Mg     2.2       07 Sep 2023 05:14    TPro  5.7    /  Alb  3.7    /  TBili  0.7    /  DBili  x      /  AST  30     /  ALT  40     /  AlkPhos  39     07 Sep 2023 05:14  Amylase x     lipase x                                                 PT/INR - ( 06 Sep 2023 05:58 )   PT: 14.70 sec;   INR: 1.28 ratio         PTT - ( 07 Sep 2023 05:14 )  PTT:65.2 sec                                       Urinalysis Basic - ( 07 Sep 2023 05:14 )    Color: x / Appearance: x / SG: x / pH: x  Gluc: 145 mg/dL / Ketone: x  / Bili: x / Urobili: x   Blood: x / Protein: x / Nitrite: x   Leuk Esterase: x / RBC: x / WBC x   Sq Epi: x / Non Sq Epi: x / Bacteria: x                                                                                                                                                     MEDICATIONS  (STANDING):  aMIOdarone    Tablet 200 milliGRAM(s) Oral two times a day  atorvastatin 80 milliGRAM(s) Oral at bedtime  chlorhexidine 2% Cloths 1 Application(s) Topical <User Schedule>  clopidogrel Tablet 75 milliGRAM(s) Oral daily  dextrose 5%. 1000 milliLiter(s) (50 mL/Hr) IV Continuous <Continuous>  dextrose 5%. 1000 milliLiter(s) (100 mL/Hr) IV Continuous <Continuous>  dextrose 50% Injectable 25 Gram(s) IV Push once  dextrose 50% Injectable 12.5 Gram(s) IV Push once  dextrose 50% Injectable 25 Gram(s) IV Push once  fenofibrate Tablet 145 milliGRAM(s) Oral daily  furosemide    Tablet 40 milliGRAM(s) Oral daily  glucagon  Injectable 1 milliGRAM(s) IntraMuscular once  heparin  Infusion 1400 Unit(s)/Hr (15 mL/Hr) IV Continuous <Continuous>  insulin lispro (ADMELOG) corrective regimen sliding scale   SubCutaneous three times a day before meals  melatonin 5 milliGRAM(s) Oral at bedtime  metoprolol tartrate 100 milliGRAM(s) Oral two times a day  nicotine - 21 mG/24Hr(s) Patch 1 Patch Transdermal daily  sacubitril 24 mG/valsartan 26 mG 1 Tablet(s) Oral two times a day    MEDICATIONS  (PRN):  dextrose Oral Gel 15 Gram(s) Oral once PRN Blood Glucose LESS THAN 70 milliGRAM(s)/deciliter  zolpidem 5 milliGRAM(s) Oral at bedtime PRN Insomnia          Xrays:  TLC:  OG:  ET tube:                                                                                       ECHO:  CAM ICU:

## 2023-09-07 NOTE — SPEECH LANGUAGE PATHOLOGY EVALUATION - SLP ABLE TO FOLLOW COMMANDS
within functional limits
at times easily performed commands, other times stared blankly. Suspect d/t attention deficit/impaired

## 2023-09-08 ENCOUNTER — TRANSCRIPTION ENCOUNTER (OUTPATIENT)
Age: 68
End: 2023-09-08

## 2023-09-08 LAB
ALBUMIN SERPL ELPH-MCNC: 4 G/DL — SIGNIFICANT CHANGE UP (ref 3.5–5.2)
ALP SERPL-CCNC: 42 U/L — SIGNIFICANT CHANGE UP (ref 30–115)
ALT FLD-CCNC: 50 U/L — HIGH (ref 0–41)
ANION GAP SERPL CALC-SCNC: 14 MMOL/L — SIGNIFICANT CHANGE UP (ref 7–14)
APTT BLD: 58.1 SEC — HIGH (ref 27–39.2)
AST SERPL-CCNC: 25 U/L — SIGNIFICANT CHANGE UP (ref 0–41)
BASOPHILS # BLD AUTO: 0.09 K/UL — SIGNIFICANT CHANGE UP (ref 0–0.2)
BASOPHILS NFR BLD AUTO: 1 % — SIGNIFICANT CHANGE UP (ref 0–1)
BILIRUB SERPL-MCNC: 0.5 MG/DL — SIGNIFICANT CHANGE UP (ref 0.2–1.2)
BUN SERPL-MCNC: 24 MG/DL — HIGH (ref 10–20)
CALCIUM SERPL-MCNC: 9.3 MG/DL — SIGNIFICANT CHANGE UP (ref 8.4–10.5)
CHLORIDE SERPL-SCNC: 102 MMOL/L — SIGNIFICANT CHANGE UP (ref 98–110)
CO2 SERPL-SCNC: 23 MMOL/L — SIGNIFICANT CHANGE UP (ref 17–32)
CREAT SERPL-MCNC: 1.2 MG/DL — SIGNIFICANT CHANGE UP (ref 0.7–1.5)
EGFR: 66 ML/MIN/1.73M2 — SIGNIFICANT CHANGE UP
EOSINOPHIL # BLD AUTO: 0.41 K/UL — SIGNIFICANT CHANGE UP (ref 0–0.7)
EOSINOPHIL NFR BLD AUTO: 4.6 % — SIGNIFICANT CHANGE UP (ref 0–8)
GLUCOSE BLDC GLUCOMTR-MCNC: 202 MG/DL — HIGH (ref 70–99)
GLUCOSE BLDC GLUCOMTR-MCNC: 206 MG/DL — HIGH (ref 70–99)
GLUCOSE BLDC GLUCOMTR-MCNC: 225 MG/DL — HIGH (ref 70–99)
GLUCOSE SERPL-MCNC: 170 MG/DL — HIGH (ref 70–99)
HCT VFR BLD CALC: 51.8 % — SIGNIFICANT CHANGE UP (ref 42–52)
HGB BLD-MCNC: 17.3 G/DL — SIGNIFICANT CHANGE UP (ref 14–18)
IMM GRANULOCYTES NFR BLD AUTO: 0.8 % — HIGH (ref 0.1–0.3)
LYMPHOCYTES # BLD AUTO: 1.95 K/UL — SIGNIFICANT CHANGE UP (ref 1.2–3.4)
LYMPHOCYTES # BLD AUTO: 22 % — SIGNIFICANT CHANGE UP (ref 20.5–51.1)
MAGNESIUM SERPL-MCNC: 2.2 MG/DL — SIGNIFICANT CHANGE UP (ref 1.8–2.4)
MCHC RBC-ENTMCNC: 29.5 PG — SIGNIFICANT CHANGE UP (ref 27–31)
MCHC RBC-ENTMCNC: 33.4 G/DL — SIGNIFICANT CHANGE UP (ref 32–37)
MCV RBC AUTO: 88.2 FL — SIGNIFICANT CHANGE UP (ref 80–94)
MONOCYTES # BLD AUTO: 0.83 K/UL — HIGH (ref 0.1–0.6)
MONOCYTES NFR BLD AUTO: 9.3 % — SIGNIFICANT CHANGE UP (ref 1.7–9.3)
NEUTROPHILS # BLD AUTO: 5.53 K/UL — SIGNIFICANT CHANGE UP (ref 1.4–6.5)
NEUTROPHILS NFR BLD AUTO: 62.3 % — SIGNIFICANT CHANGE UP (ref 42.2–75.2)
NRBC # BLD: 0 /100 WBCS — SIGNIFICANT CHANGE UP (ref 0–0)
PLATELET # BLD AUTO: 230 K/UL — SIGNIFICANT CHANGE UP (ref 130–400)
PMV BLD: 11.8 FL — HIGH (ref 7.4–10.4)
POTASSIUM SERPL-MCNC: 4.6 MMOL/L — SIGNIFICANT CHANGE UP (ref 3.5–5)
POTASSIUM SERPL-SCNC: 4.6 MMOL/L — SIGNIFICANT CHANGE UP (ref 3.5–5)
PROT SERPL-MCNC: 5.9 G/DL — LOW (ref 6–8)
RBC # BLD: 5.87 M/UL — SIGNIFICANT CHANGE UP (ref 4.7–6.1)
RBC # FLD: 12.8 % — SIGNIFICANT CHANGE UP (ref 11.5–14.5)
SODIUM SERPL-SCNC: 139 MMOL/L — SIGNIFICANT CHANGE UP (ref 135–146)
WBC # BLD: 8.88 K/UL — SIGNIFICANT CHANGE UP (ref 4.8–10.8)
WBC # FLD AUTO: 8.88 K/UL — SIGNIFICANT CHANGE UP (ref 4.8–10.8)

## 2023-09-08 PROCEDURE — 70551 MRI BRAIN STEM W/O DYE: CPT | Mod: 26

## 2023-09-08 PROCEDURE — 99232 SBSQ HOSP IP/OBS MODERATE 35: CPT

## 2023-09-08 RX ORDER — APIXABAN 2.5 MG/1
5 TABLET, FILM COATED ORAL EVERY 12 HOURS
Refills: 0 | Status: DISCONTINUED | OUTPATIENT
Start: 2023-09-08 | End: 2023-09-09

## 2023-09-08 RX ADMIN — SACUBITRIL AND VALSARTAN 1 TABLET(S): 24; 26 TABLET, FILM COATED ORAL at 17:29

## 2023-09-08 RX ADMIN — Medication 2: at 07:47

## 2023-09-08 RX ADMIN — Medication 1 PATCH: at 07:41

## 2023-09-08 RX ADMIN — Medication 40 MILLIGRAM(S): at 05:20

## 2023-09-08 RX ADMIN — CHLORHEXIDINE GLUCONATE 1 APPLICATION(S): 213 SOLUTION TOPICAL at 05:25

## 2023-09-08 RX ADMIN — HEPARIN SODIUM 15 UNIT(S)/HR: 5000 INJECTION INTRAVENOUS; SUBCUTANEOUS at 07:50

## 2023-09-08 RX ADMIN — Medication 2: at 16:22

## 2023-09-08 RX ADMIN — APIXABAN 5 MILLIGRAM(S): 2.5 TABLET, FILM COATED ORAL at 21:54

## 2023-09-08 RX ADMIN — AMIODARONE HYDROCHLORIDE 200 MILLIGRAM(S): 400 TABLET ORAL at 05:20

## 2023-09-08 RX ADMIN — Medication 3 UNIT(S): at 16:22

## 2023-09-08 RX ADMIN — Medication 100 MILLIGRAM(S): at 05:23

## 2023-09-08 RX ADMIN — SACUBITRIL AND VALSARTAN 1 TABLET(S): 24; 26 TABLET, FILM COATED ORAL at 05:20

## 2023-09-08 RX ADMIN — INSULIN GLARGINE 10 UNIT(S): 100 INJECTION, SOLUTION SUBCUTANEOUS at 21:55

## 2023-09-08 RX ADMIN — Medication 1 PATCH: at 13:10

## 2023-09-08 RX ADMIN — Medication 145 MILLIGRAM(S): at 13:10

## 2023-09-08 RX ADMIN — Medication 100 MILLIGRAM(S): at 17:29

## 2023-09-08 RX ADMIN — CLOPIDOGREL BISULFATE 75 MILLIGRAM(S): 75 TABLET, FILM COATED ORAL at 13:10

## 2023-09-08 RX ADMIN — Medication 3 UNIT(S): at 07:47

## 2023-09-08 RX ADMIN — ATORVASTATIN CALCIUM 80 MILLIGRAM(S): 80 TABLET, FILM COATED ORAL at 21:54

## 2023-09-08 RX ADMIN — Medication 1 PATCH: at 13:00

## 2023-09-08 NOTE — DISCHARGE NOTE PROVIDER - NSDCQMSTROKERISK_NEU_ALL_CORE
History of a stroke or TIA Atrial fibrillation/Diabetes/High blood pressure/High cholesterol/History of a stroke or TIA

## 2023-09-08 NOTE — DISCHARGE NOTE PROVIDER - PROVIDER TOKENS
PROVIDER:[TOKEN:[[id number]],SCHEDULEDAPPT:[09/21/2023]],PROVIDER:[TOKEN:[[id number]],FOLLOWUP:[2 weeks]],PROVIDER:[TOKEN:[[id number]],FOLLOWUP:[1 week]] PROVIDER:[TOKEN:[[id number]],SCHEDULEDAPPT:[09/21/2023]],PROVIDER:[TOKEN:[[id number]],FOLLOWUP:[2 weeks]]

## 2023-09-08 NOTE — DISCHARGE NOTE PROVIDER - CARE PROVIDER_API CALL
Khalif Lamar  Internal Medicine  [address] [place] [address] [address] [place] [address]  Phone: [phone]  Fax: [phone]  Scheduled Appointment: 09/21/2023    Martin Shaver  Internal Medicine  [address] [place] [address]  Phone: [phone]  Fax: [phone]  Follow Up Time: 2 weeks    Jimmy Ching  Neurology  [address] [place] [address], [address] [id number]  Phone: [phone]  Fax: [phone]  Follow Up Time: 1 week   Khalif Lamar  Internal Medicine  [address] [place] [address], [address]  [place] [address]  Phone: [phone]  Fax: [phone]  Scheduled Appointment: 09/21/2023    Martin Shaver  Internal Medicine  [address] [place] [address]  Phone: [phone]  Fax: [phone]  Follow Up Time: 2 weeks

## 2023-09-08 NOTE — PROGRESS NOTE ADULT - PROVIDER SPECIALTY LIST ADULT
Hospitalist
Hospitalist
MICU
Pulmonology
Cardiology
Hospitalist
Hospitalist
Infectious Disease
Internal Medicine
Neurology
Neurology
Pulmonology
Hospitalist
Infectious Disease

## 2023-09-08 NOTE — DISCHARGE NOTE PROVIDER - HOSPITAL COURSE
67 yo male with hx HTN, IDDM, HLD, active smoker (+1PPD), CAD s/p PCI, A-Fib on Eliquis, HFrEF (30%) s/p AICD p/w expressive aphasia, right sided weakness and difficulty getting up from the ground since yesterday morning.  Last known normal was approximately 9/1 evening, pt is AAOx3, conversational and ambulates independently at baseline as per spouse. According to spouse, pt compliant with his medications. She denies any complaints of fevers, chest pain, sob, recent illness. History and ROS limited at this time 2/2 pt with speech difficulty, and spouse is poor historian.     In the ED,   T(F): 99.4   HR: 145 A-Fib w/ RVR w/ runs of SVT started on Amio gtt. Cardio called, agree with plan, will follow. AICD was interrogated per ED, consistent with A-Fib w/ RVR and SVT.    BP: 179/76  RR: 30  SpO2: 96% on room air  Labs showed WBC 22k w/ NP, Hgb 19, Cr 1.6 baseline 1.4, Lactate 6.3 pH wnl, troponin 0.05. UA negative.   Stroke code on arrival. Imaging revealed large (96 mL) volume of decreased cerebral blood flow within the left MCA   territory, concerning for ischemia. No core infarct detected. No acute intervention.  Being admitted to MICU for q1h neuro checks.     In the ICU:       67 yo male with hx HTN, IDDM, HLD, active smoker (+1PPD), CAD s/p PCI, A-Fib on Eliquis, HFrEF (30%) s/p AICD p/w expressive aphasia, right sided weakness and difficulty getting up from the ground since yesterday morning.  Last known normal was approximately 9/1 evening, pt is AAOx3, conversational and ambulates independently at baseline as per spouse. According to spouse, pt compliant with his medications. She denies any complaints of fevers, chest pain, sob, recent illness. History and ROS limited at this time 2/2 pt with speech difficulty, and spouse is poor historian.     In the ED,   T(F): 99.4   HR: 145 A-Fib w/ RVR w/ runs of SVT started on Amio gtt. Cardio called, agree with plan, will follow. AICD was interrogated per ED, consistent with A-Fib w/ RVR and SVT.    BP: 179/76  RR: 30  SpO2: 96% on room air  Labs showed WBC 22k w/ NP, Hgb 19, Cr 1.6 baseline 1.4, Lactate 6.3 pH wnl, troponin 0.05. UA negative.   Stroke code on arrival. Imaging revealed large (96 mL) volume of decreased cerebral blood flow within the left MCA   territory, concerning for ischemia. No core infarct detected. No acute intervention.  Being admitted to MICU for q1h neuro checks.     In the ICU:   The neurology team recommended starting continuing with plavix and full dose AC with heparin and they also recommended an MRI brain which showed-----???. His A1c 6.1, lipid profile: ldl 61. Speech and swallow saw the patient and he was cleared for an oral diet. Patient had brief runs of A fib W rvr. He was loaded with amiodarone and started on PO amio that can be decreased to 200 QD in 1 week.       The patient was continued on his other chronic medications for chronic conditions.          67 yo male with hx HTN, IDDM, HLD, active smoker (+1PPD), CAD s/p PCI, A-Fib on Eliquis, HFrEF (30%) s/p AICD p/w expressive aphasia, right sided weakness and difficulty getting up from the ground since yesterday morning.  Last known normal was approximately 9/1 evening, pt is AAOx3, conversational and ambulates independently at baseline as per spouse. According to spouse, pt compliant with his medications. She denies any complaints of fevers, chest pain, sob, recent illness. History and ROS limited at this time 2/2 pt with speech difficulty, and spouse is poor historian.     In the ED,   T(F): 99.4   HR: 145 A-Fib w/ RVR w/ runs of SVT started on Amio gtt. Cardio called, agree with plan, will follow. AICD was interrogated per ED, consistent with A-Fib w/ RVR and SVT.    BP: 179/76  RR: 30  SpO2: 96% on room air  Labs showed WBC 22k w/ NP, Hgb 19, Cr 1.6 baseline 1.4, Lactate 6.3 pH wnl, troponin 0.05. UA negative.   Stroke code on arrival. Imaging revealed large (96 mL) volume of decreased cerebral blood flow within the left MCA   territory, concerning for ischemia. No core infarct detected. No acute intervention.  Being admitted to MICU for q1h neuro checks.     In the ICU:   The neurology team recommended starting continuing with plavix and full dose AC with heparin and they also recommended an MR brain.  MRI brain-IMPRESSION:  There is no evidence of acute intracranial pathology. No evidence of   acute infarct, intracranial hemorrhage or mass effect.  Mild chronic microvascular type changes as well as multiple chronic   infarcts involving the bilateral thalami and bilateral cerebellar   hemispheres. .   His A1c 6.1, lipid profile: ldl 61. Speech and swallow saw the patient and he was cleared for an oral diet. Patient had brief runs of A fib W rvr. He was loaded with amiodarone and started on PO amio that can be decreased to 200 QD in 1 week.       The patient was continued on his other chronic medications for chronic conditions.     Per neuro, likely a tia.  Will need to follow in Stroke clinic after discharge.

## 2023-09-08 NOTE — DISCHARGE NOTE PROVIDER - NSFOLLOWUPCLINICS_GEN_ALL_ED_FT
[facility]  Neurology  [address] [place] [address], [address]  [place] [address]  Phone: [phone]  Fax:

## 2023-09-08 NOTE — DISCHARGE NOTE PROVIDER - NSDCFUSCHEDAPPT_GEN_ALL_CORE_FT
Khalif Lamar  [facility]  CARDIOLOGY [address]  Scheduled Appointment: 09/21/2023    [facility]  CARDIOLOGY [address]  Scheduled Appointment: 11/15/2023

## 2023-09-08 NOTE — DISCHARGE NOTE PROVIDER - NSDCCPCAREPLAN_GEN_ALL_CORE_FT
PRINCIPAL DISCHARGE DIAGNOSIS  Diagnosis: Stroke  Assessment and Plan of Treatment: You have been diagnosed with stroke which is a condition that develops when part of the brain doesn't get enough blood and oxygen   - Take your blood thinners and cholesterol medication as prescribed. Do not skip doses and do not run low on your medication. call your doctor if you need refills   - If you have diabetes, take your diabetes medication as prescribed. Check your blood sugar level every day and contact your doctor if the levels arr high as your medication may need to be re-adjusted or some medication may need to be added   - If you have hypertension, take your blood pressure medication as prescribed. Measure your blood pressure with a cuff every day and write down the values. Call your doctors if the values are high despite medication as he may adjust your medication   - Avoid smoking and do not let anyone smoke next to you. If you are a smoker and find it hard to smoke seek help or call your doctors for referrals for counselling programs   - Follow up with your doctor as outpatient. he may prescribe regular lab work to keep track of your cholesterol and sugar levels in your blood. Do not skip appointments and always be compliant wit your doctor's advise  - Call 911 or report to the emergency department if you have sudden onset severe headache, vision problems such as blurry or double vision or vision that is going dark, vertigo, numbness or weakness anywhere in your body, slurred speech or difficulty walking       PRINCIPAL DISCHARGE DIAGNOSIS  Diagnosis: Brain TIA  Assessment and Plan of Treatment: You had a small Stroke which we refer to as a tia.  This is still a type of Stroke and you need to take all medications as prescribed and follow with your regular doctor and our Stroke Clinic with 2 weeks.

## 2023-09-08 NOTE — PROGRESS NOTE ADULT - SUBJECTIVE AND OBJECTIVE BOX
Patient is a 68y old  Male who presents with a chief complaint of Stroke (05 Sep 2023 11:12)      T(F): 97.8 (09-07-23 @ 23:00), Max: 97.8 (09-07-23 @ 23:00)  HR: 83 (09-08-23 @ 07:26)  BP: 131/82 (09-08-23 @ 07:26)  RR: 18 (09-08-23 @ 07:26)  SpO2: 96% (09-08-23 @ 07:26) (96% - 98%)    PHYSICAL EXAM:  GENERAL: NAD  HEAD:  Atraumatic, Normocephalic  EYES: EOMI, PERRLA, conjunctiva and sclera clear  NERVOUS SYSTEM:   no focal deficits   CHEST/LUNG: Clear to percussion bilaterally; No rales, rhonchi, wheezing, or rubs  HEART: Regular rate and rhythm; No murmurs, rubs, or gallops  ABDOMEN: Soft, Nontender, Nondistended; Bowel sounds present  EXTREMITIES:  2+ Peripheral Pulses, No clubbing, cyanosis, or edema    LABS  09-08    139  |  102  |  24<H>  ----------------------------<  170<H>  4.6   |  23  |  1.2    Ca    9.3      08 Sep 2023 05:07  Mg     2.2     09-08    TPro  5.9<L>  /  Alb  4.0  /  TBili  0.5  /  DBili  x   /  AST  25  /  ALT  50<H>  /  AlkPhos  42  09-08                          17.3   8.88  )-----------( 230      ( 08 Sep 2023 05:07 )             51.8     PTT - ( 08 Sep 2023 05:07 )  PTT:58.1 sec    < from: TTE Echo Complete w/o Contrast w/ Doppler (09.04.23 @ 09:07) >    Summary:   1. LV Ejection Fraction by Beck's Method with a biplane EF of 38 %.   2. Mild left ventricular hypertrophy.   3. Mildly enlarged left atrium.   4. There is no evidence of pericardial effusion.   5. Mild mitral annular calcification.   6. Bioprosthesis in the aortic position.    < end of copied text >      Culture Results:   No growth at 4 days (09-03-23)  Culture Results:   No growth at 4 days (09-03-23)    RADIOLOGY  < from: CT Head No Cont (09.03.23 @ 21:49) >    IMPRESSION:  No evidence of acute transcortical infarct, acute intracranial   hemorrhage, or mass effect.    < end of copied text >  < from: CT Brain Perfusion Maps Stroke (09.03.23 @ 12:58) >  IMPRESSION:  CT PERFUSION:  Large (96 mL) volume of decreased cerebral blood flow within the left MCA   territory, concerning for ischemia. No core infarct detected.    CTA HEAD/NECK:  Atherosclerotic plaques are seen in the bilateral carotid bulbs and   carotid siphons contributing to up to moderate stenosis in the right   carotid bulb.    < end of copied text >    MEDICATIONS  (STANDING):  aMIOdarone    Tablet 200 milliGRAM(s) Oral daily  atorvastatin 80 milliGRAM(s) Oral at bedtime  chlorhexidine 2% Cloths 1 Application(s) Topical <User Schedule>  clopidogrel Tablet 75 milliGRAM(s) Oral daily  fenofibrate Tablet 145 milliGRAM(s) Oral daily  furosemide    Tablet 40 milliGRAM(s) Oral daily  heparin  Infusion 1400 Unit(s)/Hr (15 mL/Hr) IV Continuous <Continuous>  insulin glargine Injectable (LANTUS) 10 Unit(s) SubCutaneous at bedtime  insulin lispro (ADMELOG) corrective regimen sliding scale   SubCutaneous three times a day before meals  insulin lispro Injectable (ADMELOG) 3 Unit(s) SubCutaneous three times a day before meals  melatonin 5 milliGRAM(s) Oral at bedtime  metoprolol tartrate 100 milliGRAM(s) Oral two times a day  nicotine - 21 mG/24Hr(s) Patch 1 Patch Transdermal daily  sacubitril 24 mG/valsartan 26 mG 1 Tablet(s) Oral two times a day    MEDICATIONS  (PRN):  dextrose Oral Gel 15 Gram(s) Oral once PRN Blood Glucose LESS THAN 70 milliGRAM(s)/deciliter  LORazepam   Injectable 1 milliGRAM(s) IV Push once PRN FOR MRI  zolpidem 5 milliGRAM(s) Oral at bedtime PRN Insomnia

## 2023-09-08 NOTE — DISCHARGE NOTE PROVIDER - CARE PROVIDERS DIRECT ADDRESSES
,[email or web address],[email or web address]

## 2023-09-08 NOTE — PROGRESS NOTE ADULT - ASSESSMENT
69 yo male with a medical hx of HTN, DM, HLD, active smoker (+1PPD), CAD s/p PCI, paroxysmal A-Fib, chronic HFrEF (30%) s/p AICD admitted to unit with  expressive aphasia associated with  right sided weakness and difficulty getting up from the ground, In ED had rapid HR and given IV Amiodarone    expressive aphasia and weakness r/o stroke / rapid afib - now rate controlled      - Plavix, Lipitor   - IV heparin ->Eliquis   - amiodarone, metoprolol, Lasix   - rate now controlled   - cardiology follow up   - MRI brain   - may downgrade to 3S tele   - PT/OT

## 2023-09-08 NOTE — DISCHARGE NOTE PROVIDER - NSDCMRMEDTOKEN_GEN_ALL_CORE_FT
atorvastatin 40 mg oral tablet: 1 tab(s) orally once a day (at bedtime)  clopidogrel 75 mg oral tablet: 1 tab(s) orally once a day  Eliquis 5 mg oral tablet: 1 tab(s) orally 2 times a day.  Can restart tomorrow 6/16  Entresto 24 mg-26 mg oral tablet: 1 tab(s) orally 2 times a day  ezetimibe 10 mg oral tablet: 1 orally once a day  fenofibrate 145 mg oral tablet: 1 orally once a day  furosemide 40 mg oral tablet: 1 orally once a day  Lunesta 1 mg oral tablet: 1 orally once a day (at bedtime)  metoprolol succinate 200 mg oral capsule, extended release: 1 cap(s) orally once a day  NovoLOG 100 units/mL injectable solution: 8 unit(s) injectable once a day  OMEGA-3 ETHYL ESTERS 1 GM CAP: 1 cap(s) orally once a day  Tresiba 100 units/mL subcutaneous solution: 150 unit(s) subcutaneous once a day   apixaban 5 mg oral tablet: 1 tab(s) orally every 12 hours  atorvastatin 40 mg oral tablet: 1 tab(s) orally once a day (at bedtime)  clopidogrel 75 mg oral tablet: 1 tab(s) orally once a day  Entresto 24 mg-26 mg oral tablet: 1 tab(s) orally 2 times a day  ezetimibe 10 mg oral tablet: 1 orally once a day  fenofibrate 145 mg oral tablet: 1 orally once a day  furosemide 40 mg oral tablet: 1 orally once a day  Lunesta 1 mg oral tablet: 1 orally once a day (at bedtime)  metoprolol succinate 200 mg oral capsule, extended release: 1 cap(s) orally once a day  NovoLOG 100 units/mL injectable solution: 8 unit(s) injectable once a day  OMEGA-3 ETHYL ESTERS 1 GM CAP: 1 cap(s) orally once a day  Pacerone 200 mg oral tablet: 1 tab(s) orally once a day  Tresiba 100 units/mL subcutaneous solution: 150 unit(s) subcutaneous once a day

## 2023-09-09 ENCOUNTER — TRANSCRIPTION ENCOUNTER (OUTPATIENT)
Age: 68
End: 2023-09-09

## 2023-09-09 VITALS
RESPIRATION RATE: 18 BRPM | HEART RATE: 88 BPM | SYSTOLIC BLOOD PRESSURE: 132 MMHG | DIASTOLIC BLOOD PRESSURE: 73 MMHG | TEMPERATURE: 98 F

## 2023-09-09 LAB
ALBUMIN SERPL ELPH-MCNC: 4.2 G/DL — SIGNIFICANT CHANGE UP (ref 3.5–5.2)
ALP SERPL-CCNC: 49 U/L — SIGNIFICANT CHANGE UP (ref 30–115)
ALT FLD-CCNC: 59 U/L — HIGH (ref 0–41)
ANION GAP SERPL CALC-SCNC: 14 MMOL/L — SIGNIFICANT CHANGE UP (ref 7–14)
AST SERPL-CCNC: 34 U/L — SIGNIFICANT CHANGE UP (ref 0–41)
BASOPHILS # BLD AUTO: 0.08 K/UL — SIGNIFICANT CHANGE UP (ref 0–0.2)
BASOPHILS NFR BLD AUTO: 0.8 % — SIGNIFICANT CHANGE UP (ref 0–1)
BILIRUB SERPL-MCNC: 0.7 MG/DL — SIGNIFICANT CHANGE UP (ref 0.2–1.2)
BUN SERPL-MCNC: 27 MG/DL — HIGH (ref 10–20)
CALCIUM SERPL-MCNC: 10 MG/DL — SIGNIFICANT CHANGE UP (ref 8.4–10.5)
CHLORIDE SERPL-SCNC: 100 MMOL/L — SIGNIFICANT CHANGE UP (ref 98–110)
CO2 SERPL-SCNC: 24 MMOL/L — SIGNIFICANT CHANGE UP (ref 17–32)
CREAT SERPL-MCNC: 1.3 MG/DL — SIGNIFICANT CHANGE UP (ref 0.7–1.5)
CULTURE RESULTS: SIGNIFICANT CHANGE UP
CULTURE RESULTS: SIGNIFICANT CHANGE UP
EGFR: 60 ML/MIN/1.73M2 — SIGNIFICANT CHANGE UP
EOSINOPHIL # BLD AUTO: 0.49 K/UL — SIGNIFICANT CHANGE UP (ref 0–0.7)
EOSINOPHIL NFR BLD AUTO: 4.6 % — SIGNIFICANT CHANGE UP (ref 0–8)
GLUCOSE BLDC GLUCOMTR-MCNC: 139 MG/DL — HIGH (ref 70–99)
GLUCOSE BLDC GLUCOMTR-MCNC: 206 MG/DL — HIGH (ref 70–99)
GLUCOSE SERPL-MCNC: 143 MG/DL — HIGH (ref 70–99)
HCT VFR BLD CALC: 55.9 % — HIGH (ref 42–52)
HGB BLD-MCNC: 18.6 G/DL — HIGH (ref 14–18)
IMM GRANULOCYTES NFR BLD AUTO: 0.8 % — HIGH (ref 0.1–0.3)
LYMPHOCYTES # BLD AUTO: 2.15 K/UL — SIGNIFICANT CHANGE UP (ref 1.2–3.4)
LYMPHOCYTES # BLD AUTO: 20.3 % — LOW (ref 20.5–51.1)
MAGNESIUM SERPL-MCNC: 2.2 MG/DL — SIGNIFICANT CHANGE UP (ref 1.8–2.4)
MCHC RBC-ENTMCNC: 29.6 PG — SIGNIFICANT CHANGE UP (ref 27–31)
MCHC RBC-ENTMCNC: 33.3 G/DL — SIGNIFICANT CHANGE UP (ref 32–37)
MCV RBC AUTO: 89 FL — SIGNIFICANT CHANGE UP (ref 80–94)
MONOCYTES # BLD AUTO: 1.03 K/UL — HIGH (ref 0.1–0.6)
MONOCYTES NFR BLD AUTO: 9.7 % — HIGH (ref 1.7–9.3)
NEUTROPHILS # BLD AUTO: 6.75 K/UL — HIGH (ref 1.4–6.5)
NEUTROPHILS NFR BLD AUTO: 63.8 % — SIGNIFICANT CHANGE UP (ref 42.2–75.2)
NRBC # BLD: 0 /100 WBCS — SIGNIFICANT CHANGE UP (ref 0–0)
PLATELET # BLD AUTO: 232 K/UL — SIGNIFICANT CHANGE UP (ref 130–400)
PMV BLD: 11.7 FL — HIGH (ref 7.4–10.4)
POTASSIUM SERPL-MCNC: 5 MMOL/L — SIGNIFICANT CHANGE UP (ref 3.5–5)
POTASSIUM SERPL-SCNC: 5 MMOL/L — SIGNIFICANT CHANGE UP (ref 3.5–5)
PROT SERPL-MCNC: 6.9 G/DL — SIGNIFICANT CHANGE UP (ref 6–8)
RBC # BLD: 6.28 M/UL — HIGH (ref 4.7–6.1)
RBC # FLD: 12.8 % — SIGNIFICANT CHANGE UP (ref 11.5–14.5)
SODIUM SERPL-SCNC: 138 MMOL/L — SIGNIFICANT CHANGE UP (ref 135–146)
SPECIMEN SOURCE: SIGNIFICANT CHANGE UP
SPECIMEN SOURCE: SIGNIFICANT CHANGE UP
WBC # BLD: 10.58 K/UL — SIGNIFICANT CHANGE UP (ref 4.8–10.8)
WBC # FLD AUTO: 10.58 K/UL — SIGNIFICANT CHANGE UP (ref 4.8–10.8)

## 2023-09-09 PROCEDURE — 99238 HOSP IP/OBS DSCHRG MGMT 30/<: CPT

## 2023-09-09 RX ORDER — APIXABAN 2.5 MG/1
1 TABLET, FILM COATED ORAL
Qty: 0 | Refills: 0 | DISCHARGE
Start: 2023-09-09

## 2023-09-09 RX ORDER — AMIODARONE HYDROCHLORIDE 400 MG/1
1 TABLET ORAL
Qty: 30 | Refills: 0
Start: 2023-09-09 | End: 2023-10-08

## 2023-09-09 RX ADMIN — Medication 100 MILLIGRAM(S): at 05:36

## 2023-09-09 RX ADMIN — AMIODARONE HYDROCHLORIDE 200 MILLIGRAM(S): 400 TABLET ORAL at 05:37

## 2023-09-09 RX ADMIN — Medication 3 UNIT(S): at 08:19

## 2023-09-09 RX ADMIN — APIXABAN 5 MILLIGRAM(S): 2.5 TABLET, FILM COATED ORAL at 08:25

## 2023-09-09 RX ADMIN — Medication 2: at 11:35

## 2023-09-09 RX ADMIN — Medication 3 UNIT(S): at 11:36

## 2023-09-09 RX ADMIN — CLOPIDOGREL BISULFATE 75 MILLIGRAM(S): 75 TABLET, FILM COATED ORAL at 11:34

## 2023-09-09 RX ADMIN — Medication 145 MILLIGRAM(S): at 12:53

## 2023-09-09 RX ADMIN — SACUBITRIL AND VALSARTAN 1 TABLET(S): 24; 26 TABLET, FILM COATED ORAL at 05:36

## 2023-09-09 RX ADMIN — Medication 40 MILLIGRAM(S): at 05:36

## 2023-09-09 NOTE — CHART NOTE - NSCHARTNOTEFT_GEN_A_CORE
MRI Brain and EEG reviewed - no evidence for stroke or epileptiform activity.  Pt back to baseline within 24 hr possible TIA.  Recommend continue secondary stroke prevention and f/u in stroke clinic in 2 wks.  Call w/ questions.

## 2023-09-09 NOTE — DISCHARGE NOTE NURSING/CASE MANAGEMENT/SOCIAL WORK - NSDCPEEMAIL_GEN_ALL_CORE
[facility] email [email or web address]

## 2023-09-09 NOTE — DISCHARGE NOTE NURSING/CASE MANAGEMENT/SOCIAL WORK - PATIENT PORTAL LINK FT
You can access the FollowMyHealth Patient Portal offered by [facility] by registering at the following website: [email or web address]. By joining [employer]’s FollowMyHealth portal, you will also be able to view your health information using other applications (apps) compatible with our system.

## 2023-09-09 NOTE — DISCHARGE NOTE NURSING/CASE MANAGEMENT/SOCIAL WORK - NSDCPEWEB_GEN_ALL_CORE
[facility] for [facility] website --- [email or web address] website --- [email or web address]

## 2023-09-12 NOTE — CDI QUERY NOTE - NSCDINOTECODERNAME_GEN_A_CORE_FT
Jeri Serna RN [facility] CCDS
Jeri Serna RN [facility] CCDS

## 2023-09-12 NOTE — CDI QUERY NOTE - NSCDIOTHERTXTBX_GEN_ALL_CORE_HH
Based on your professional judgment and the clinical indicators, please clarify if the documentation of sepsis by Infectious Disease can be further specified as:  • I concur with the documentation of sepsis by Infectious Disease   • I do not concur with the documentation of sepsis by Infectious Disease   • Other (please specify):  • Clinically unable to determine    CLINICAL INDICATORS  9/3 H&P Adult: Suspected left MCA ischemic stroke … Leukocytosis w/ elevated lactate … -UA negative-f/u Chest x-ray high risk for aspiration pna, procal-repeat CBC post fluids - likely hemoconcentrated. if not improving consider heme eval-empiric antibiotics for now Vanc  Cefepime-trend lactate; goal directed fluid resuscitation -f/u ID    9/4 Consult Note Adult-Infectious Disease Attending: … Sepsis on admission. Lactic acidosis … Bilateral opacities noted on CXR- possible edema … RECOMMENDATIONS-Follow up with Cultures. Follow up with procalcitonin.-For now keep on IV vancomycin 1500mg Q 24 hours. Check vanc trough before 3rd dose.-Continue with IV Cefepime 2 gram q 12 hours. -Will de-escalate or stop based on further information and clinical progression    9/5 Consult Note Adult-Pulmonology Attending: … CHF more then PNA … INFECTIOUS DISEASE: abx as per ID    9/5 Progress Note Adult-Infectious Disease Attending: … Sepsis on admission. Lactic acidosis … Bilateral opacities noted on CXR- possible edema … RECOMMENDATIONS-Follow up with Cultures.-Reasonable to stop the antibiotics and monitor    9/6 Progress Note Adult-Infectious Disease Attending: … Sepsis on admission. Lactic acidosis … Bilateral opacities noted on CXR- possible edema … RECOMMENDATIONS-Cultures remain NGTD -Monitor off antibiotics.    Nursing VS flowsheet: 9/3 RR 24, 36, 32    Lab findings: 9/3 WBC-22.31, Lactate-6.30: 9/4 Procalcitonin-0.20    Per MAR: Sodium Chloride 0.9% Bolus, 1000mls. Administered 9/3  x2                  Vancomycin IVPB. Indication: elevated wbc. Administered 9/3                 Vancomycin IVPB. Indication: Empiric dosing. Administered 9/3-9/4                 Rocephin IVPB. Indication: fever. Administered 9/3                 Cefepime IVPB. Indication: . Administered 9/4-9/5    Thank you,  Jeri Serna RN [facility] CCDS  [phone]
Based on your professional judgment and the clinical indicators, please clarify if the HFrEF can be further specified as:  • Acute on chronic HFrEF was treated and evaluated  • Chronic HFrEF was treated and evaluated   • Other (please specify):  • Clinically unable to determine    CLINICAL INDICATORS  9/3 H&P Adult: … HFrEF (30%) s/p AICD - stable, resume lasix PO 40 mg tomorrow, cont Entresto, hold BB for permissive BP goal    9/5 Consult Note Adult-Pulmonology Attending: … CHF more then PNA … consider lasix 40 mg IV    9/7 Progress Note Adult-Internal Medicine Resident: … Chest Xray with infiltrates likely edema improving on Lasix … HFrEF (30%) s/p AICD - stable, cont Entresto, BB, amiodarone, plavix, lasix, heparin gtt    9/8 Progress Note Adult-Hospitalist Attending: … medical hx of … chronic HFrEF    9/3 Chest xray: … Bilateral opacities/edema.    9/5 Chest xray: … Improved bilateral interstitial opacities.    Lab findings: 9/5 BNP-2240, 9/6-2025    Per MAR: Lasix 20 mg IV Push. Administered 9/5                 Lasix 40 mg oral, once daily. Administered 9/5-9/8    Thank you,  Jeri Serna RN [facility] CCDS  [phone]

## 2023-09-19 DIAGNOSIS — E11.9 TYPE 2 DIABETES MELLITUS WITHOUT COMPLICATIONS: ICD-10-CM

## 2023-09-19 DIAGNOSIS — Z88.2 ALLERGY STATUS TO SULFONAMIDES: ICD-10-CM

## 2023-09-19 DIAGNOSIS — Z95.5 PRESENCE OF CORONARY ANGIOPLASTY IMPLANT AND GRAFT: ICD-10-CM

## 2023-09-19 DIAGNOSIS — E78.5 HYPERLIPIDEMIA, UNSPECIFIED: ICD-10-CM

## 2023-09-19 DIAGNOSIS — R47.01 APHASIA: ICD-10-CM

## 2023-09-19 DIAGNOSIS — Z79.84 LONG TERM (CURRENT) USE OF ORAL HYPOGLYCEMIC DRUGS: ICD-10-CM

## 2023-09-19 DIAGNOSIS — I50.22 CHRONIC SYSTOLIC (CONGESTIVE) HEART FAILURE: ICD-10-CM

## 2023-09-19 DIAGNOSIS — R47.1 DYSARTHRIA AND ANARTHRIA: ICD-10-CM

## 2023-09-19 DIAGNOSIS — I48.91 UNSPECIFIED ATRIAL FIBRILLATION: ICD-10-CM

## 2023-09-19 DIAGNOSIS — I25.5 ISCHEMIC CARDIOMYOPATHY: ICD-10-CM

## 2023-09-19 DIAGNOSIS — E66.9 OBESITY, UNSPECIFIED: ICD-10-CM

## 2023-09-19 DIAGNOSIS — I11.0 HYPERTENSIVE HEART DISEASE WITH HEART FAILURE: ICD-10-CM

## 2023-09-19 DIAGNOSIS — E87.20 ACIDOSIS, UNSPECIFIED: ICD-10-CM

## 2023-09-19 DIAGNOSIS — F17.200 NICOTINE DEPENDENCE, UNSPECIFIED, UNCOMPLICATED: ICD-10-CM

## 2023-09-19 DIAGNOSIS — K21.9 GASTRO-ESOPHAGEAL REFLUX DISEASE WITHOUT ESOPHAGITIS: ICD-10-CM

## 2023-09-19 DIAGNOSIS — Z86.73 PERSONAL HISTORY OF TRANSIENT ISCHEMIC ATTACK (TIA), AND CEREBRAL INFARCTION WITHOUT RESIDUAL DEFICITS: ICD-10-CM

## 2023-09-19 DIAGNOSIS — I47.1 SUPRAVENTRICULAR TACHYCARDIA: ICD-10-CM

## 2023-09-19 DIAGNOSIS — Z79.82 LONG TERM (CURRENT) USE OF ASPIRIN: ICD-10-CM

## 2023-09-19 DIAGNOSIS — Z79.01 LONG TERM (CURRENT) USE OF ANTICOAGULANTS: ICD-10-CM

## 2023-09-19 DIAGNOSIS — J44.9 CHRONIC OBSTRUCTIVE PULMONARY DISEASE, UNSPECIFIED: ICD-10-CM

## 2023-09-19 DIAGNOSIS — R29.898 OTHER SYMPTOMS AND SIGNS INVOLVING THE MUSCULOSKELETAL SYSTEM: ICD-10-CM

## 2023-09-19 DIAGNOSIS — Z79.4 LONG TERM (CURRENT) USE OF INSULIN: ICD-10-CM

## 2023-09-19 DIAGNOSIS — D72.829 ELEVATED WHITE BLOOD CELL COUNT, UNSPECIFIED: ICD-10-CM

## 2023-09-19 DIAGNOSIS — I48.0 PAROXYSMAL ATRIAL FIBRILLATION: ICD-10-CM

## 2023-09-19 DIAGNOSIS — R47.81 SLURRED SPEECH: ICD-10-CM

## 2023-09-19 DIAGNOSIS — Z79.02 LONG TERM (CURRENT) USE OF ANTITHROMBOTICS/ANTIPLATELETS: ICD-10-CM

## 2023-09-19 DIAGNOSIS — G45.9 TRANSIENT CEREBRAL ISCHEMIC ATTACK, UNSPECIFIED: ICD-10-CM

## 2023-09-19 DIAGNOSIS — Z79.85 LONG-TERM (CURRENT) USE OF INJECTABLE NON-INSULIN ANTIDIABETIC DRUGS: ICD-10-CM

## 2023-09-19 DIAGNOSIS — R29.810 FACIAL WEAKNESS: ICD-10-CM

## 2023-09-21 ENCOUNTER — APPOINTMENT (OUTPATIENT)
Dept: CARDIOLOGY | Facility: CLINIC | Age: 68
End: 2023-09-21

## 2023-09-21 NOTE — SWALLOW BEDSIDE ASSESSMENT ADULT - SLP PRECAUTIONS/LIMITATIONS: VISION
RECORD UPLOAD  EYE EXAM HYPERLINKED    
per OT and SLP observations/impaired
per OT and SLP observations/impaired

## 2023-10-26 ENCOUNTER — APPOINTMENT (OUTPATIENT)
Dept: CARDIOLOGY | Facility: CLINIC | Age: 68
End: 2023-10-26

## 2023-10-26 ENCOUNTER — EMERGENCY (EMERGENCY)
Facility: HOSPITAL | Age: 68
LOS: 0 days | Discharge: HOME | End: 2023-10-26
Attending: EMERGENCY MEDICINE
Payer: MEDICARE

## 2023-10-26 VITALS
DIASTOLIC BLOOD PRESSURE: 70 MMHG | RESPIRATION RATE: 20 BRPM | OXYGEN SATURATION: 99 % | SYSTOLIC BLOOD PRESSURE: 116 MMHG | HEART RATE: 85 BPM

## 2023-10-26 VITALS
HEART RATE: 89 BPM | TEMPERATURE: 98 F | DIASTOLIC BLOOD PRESSURE: 104 MMHG | OXYGEN SATURATION: 99 % | SYSTOLIC BLOOD PRESSURE: 165 MMHG | HEIGHT: 66 IN | RESPIRATION RATE: 18 BRPM | WEIGHT: 205.03 LBS

## 2023-10-26 DIAGNOSIS — J44.9 CHRONIC OBSTRUCTIVE PULMONARY DISEASE, UNSPECIFIED: ICD-10-CM

## 2023-10-26 DIAGNOSIS — Z98.890 OTHER SPECIFIED POSTPROCEDURAL STATES: Chronic | ICD-10-CM

## 2023-10-26 DIAGNOSIS — F17.200 NICOTINE DEPENDENCE, UNSPECIFIED, UNCOMPLICATED: ICD-10-CM

## 2023-10-26 DIAGNOSIS — I48.91 UNSPECIFIED ATRIAL FIBRILLATION: ICD-10-CM

## 2023-10-26 DIAGNOSIS — I50.9 HEART FAILURE, UNSPECIFIED: ICD-10-CM

## 2023-10-26 DIAGNOSIS — K21.9 GASTRO-ESOPHAGEAL REFLUX DISEASE WITHOUT ESOPHAGITIS: ICD-10-CM

## 2023-10-26 DIAGNOSIS — Z79.4 LONG TERM (CURRENT) USE OF INSULIN: ICD-10-CM

## 2023-10-26 DIAGNOSIS — Z95.5 PRESENCE OF CORONARY ANGIOPLASTY IMPLANT AND GRAFT: Chronic | ICD-10-CM

## 2023-10-26 DIAGNOSIS — Z95.4 PRESENCE OF OTHER HEART-VALVE REPLACEMENT: ICD-10-CM

## 2023-10-26 DIAGNOSIS — E11.649 TYPE 2 DIABETES MELLITUS WITH HYPOGLYCEMIA WITHOUT COMA: ICD-10-CM

## 2023-10-26 DIAGNOSIS — I11.0 HYPERTENSIVE HEART DISEASE WITH HEART FAILURE: ICD-10-CM

## 2023-10-26 DIAGNOSIS — I25.10 ATHEROSCLEROTIC HEART DISEASE OF NATIVE CORONARY ARTERY WITHOUT ANGINA PECTORIS: ICD-10-CM

## 2023-10-26 DIAGNOSIS — Z95.2 PRESENCE OF PROSTHETIC HEART VALVE: Chronic | ICD-10-CM

## 2023-10-26 DIAGNOSIS — E78.5 HYPERLIPIDEMIA, UNSPECIFIED: ICD-10-CM

## 2023-10-26 DIAGNOSIS — Z95.5 PRESENCE OF CORONARY ANGIOPLASTY IMPLANT AND GRAFT: ICD-10-CM

## 2023-10-26 DIAGNOSIS — I45.10 UNSPECIFIED RIGHT BUNDLE-BRANCH BLOCK: ICD-10-CM

## 2023-10-26 DIAGNOSIS — Z79.02 LONG TERM (CURRENT) USE OF ANTITHROMBOTICS/ANTIPLATELETS: ICD-10-CM

## 2023-10-26 LAB
ALBUMIN SERPL ELPH-MCNC: 4.1 G/DL — SIGNIFICANT CHANGE UP (ref 3.5–5.2)
ALBUMIN SERPL ELPH-MCNC: 4.1 G/DL — SIGNIFICANT CHANGE UP (ref 3.5–5.2)
ALP SERPL-CCNC: 29 U/L — LOW (ref 30–115)
ALP SERPL-CCNC: 29 U/L — LOW (ref 30–115)
ALT FLD-CCNC: 22 U/L — SIGNIFICANT CHANGE UP (ref 0–41)
ALT FLD-CCNC: 22 U/L — SIGNIFICANT CHANGE UP (ref 0–41)
ANION GAP SERPL CALC-SCNC: 8 MMOL/L — SIGNIFICANT CHANGE UP (ref 7–14)
ANION GAP SERPL CALC-SCNC: 8 MMOL/L — SIGNIFICANT CHANGE UP (ref 7–14)
AST SERPL-CCNC: 35 U/L — SIGNIFICANT CHANGE UP (ref 0–41)
AST SERPL-CCNC: 35 U/L — SIGNIFICANT CHANGE UP (ref 0–41)
BASOPHILS # BLD AUTO: 0.09 K/UL — SIGNIFICANT CHANGE UP (ref 0–0.2)
BASOPHILS # BLD AUTO: 0.09 K/UL — SIGNIFICANT CHANGE UP (ref 0–0.2)
BASOPHILS NFR BLD AUTO: 0.6 % — SIGNIFICANT CHANGE UP (ref 0–1)
BASOPHILS NFR BLD AUTO: 0.6 % — SIGNIFICANT CHANGE UP (ref 0–1)
BILIRUB SERPL-MCNC: 0.5 MG/DL — SIGNIFICANT CHANGE UP (ref 0.2–1.2)
BILIRUB SERPL-MCNC: 0.5 MG/DL — SIGNIFICANT CHANGE UP (ref 0.2–1.2)
BUN SERPL-MCNC: 29 MG/DL — HIGH (ref 10–20)
BUN SERPL-MCNC: 29 MG/DL — HIGH (ref 10–20)
CALCIUM SERPL-MCNC: 9.7 MG/DL — SIGNIFICANT CHANGE UP (ref 8.4–10.5)
CALCIUM SERPL-MCNC: 9.7 MG/DL — SIGNIFICANT CHANGE UP (ref 8.4–10.5)
CHLORIDE SERPL-SCNC: 107 MMOL/L — SIGNIFICANT CHANGE UP (ref 98–110)
CHLORIDE SERPL-SCNC: 107 MMOL/L — SIGNIFICANT CHANGE UP (ref 98–110)
CO2 SERPL-SCNC: 27 MMOL/L — SIGNIFICANT CHANGE UP (ref 17–32)
CO2 SERPL-SCNC: 27 MMOL/L — SIGNIFICANT CHANGE UP (ref 17–32)
CREAT SERPL-MCNC: 1.9 MG/DL — HIGH (ref 0.7–1.5)
CREAT SERPL-MCNC: 1.9 MG/DL — HIGH (ref 0.7–1.5)
EGFR: 38 ML/MIN/1.73M2 — LOW
EGFR: 38 ML/MIN/1.73M2 — LOW
EOSINOPHIL # BLD AUTO: 0.15 K/UL — SIGNIFICANT CHANGE UP (ref 0–0.7)
EOSINOPHIL # BLD AUTO: 0.15 K/UL — SIGNIFICANT CHANGE UP (ref 0–0.7)
EOSINOPHIL NFR BLD AUTO: 1.1 % — SIGNIFICANT CHANGE UP (ref 0–8)
EOSINOPHIL NFR BLD AUTO: 1.1 % — SIGNIFICANT CHANGE UP (ref 0–8)
GLUCOSE BLDC GLUCOMTR-MCNC: 103 MG/DL — HIGH (ref 70–99)
GLUCOSE BLDC GLUCOMTR-MCNC: 103 MG/DL — HIGH (ref 70–99)
GLUCOSE BLDC GLUCOMTR-MCNC: 104 MG/DL — HIGH (ref 70–99)
GLUCOSE BLDC GLUCOMTR-MCNC: 104 MG/DL — HIGH (ref 70–99)
GLUCOSE BLDC GLUCOMTR-MCNC: 98 MG/DL — SIGNIFICANT CHANGE UP (ref 70–99)
GLUCOSE BLDC GLUCOMTR-MCNC: 98 MG/DL — SIGNIFICANT CHANGE UP (ref 70–99)
GLUCOSE SERPL-MCNC: 86 MG/DL — SIGNIFICANT CHANGE UP (ref 70–99)
GLUCOSE SERPL-MCNC: 86 MG/DL — SIGNIFICANT CHANGE UP (ref 70–99)
HCT VFR BLD CALC: 52.7 % — HIGH (ref 42–52)
HCT VFR BLD CALC: 52.7 % — HIGH (ref 42–52)
HGB BLD-MCNC: 17 G/DL — SIGNIFICANT CHANGE UP (ref 14–18)
HGB BLD-MCNC: 17 G/DL — SIGNIFICANT CHANGE UP (ref 14–18)
IMM GRANULOCYTES NFR BLD AUTO: 0.6 % — HIGH (ref 0.1–0.3)
IMM GRANULOCYTES NFR BLD AUTO: 0.6 % — HIGH (ref 0.1–0.3)
LYMPHOCYTES # BLD AUTO: 1.67 K/UL — SIGNIFICANT CHANGE UP (ref 1.2–3.4)
LYMPHOCYTES # BLD AUTO: 1.67 K/UL — SIGNIFICANT CHANGE UP (ref 1.2–3.4)
LYMPHOCYTES # BLD AUTO: 11.8 % — LOW (ref 20.5–51.1)
LYMPHOCYTES # BLD AUTO: 11.8 % — LOW (ref 20.5–51.1)
MAGNESIUM SERPL-MCNC: 2.2 MG/DL — SIGNIFICANT CHANGE UP (ref 1.8–2.4)
MAGNESIUM SERPL-MCNC: 2.2 MG/DL — SIGNIFICANT CHANGE UP (ref 1.8–2.4)
MCHC RBC-ENTMCNC: 29.5 PG — SIGNIFICANT CHANGE UP (ref 27–31)
MCHC RBC-ENTMCNC: 29.5 PG — SIGNIFICANT CHANGE UP (ref 27–31)
MCHC RBC-ENTMCNC: 32.3 G/DL — SIGNIFICANT CHANGE UP (ref 32–37)
MCHC RBC-ENTMCNC: 32.3 G/DL — SIGNIFICANT CHANGE UP (ref 32–37)
MCV RBC AUTO: 91.3 FL — SIGNIFICANT CHANGE UP (ref 80–94)
MCV RBC AUTO: 91.3 FL — SIGNIFICANT CHANGE UP (ref 80–94)
MONOCYTES # BLD AUTO: 0.77 K/UL — HIGH (ref 0.1–0.6)
MONOCYTES # BLD AUTO: 0.77 K/UL — HIGH (ref 0.1–0.6)
MONOCYTES NFR BLD AUTO: 5.4 % — SIGNIFICANT CHANGE UP (ref 1.7–9.3)
MONOCYTES NFR BLD AUTO: 5.4 % — SIGNIFICANT CHANGE UP (ref 1.7–9.3)
NEUTROPHILS # BLD AUTO: 11.43 K/UL — HIGH (ref 1.4–6.5)
NEUTROPHILS # BLD AUTO: 11.43 K/UL — HIGH (ref 1.4–6.5)
NEUTROPHILS NFR BLD AUTO: 80.5 % — HIGH (ref 42.2–75.2)
NEUTROPHILS NFR BLD AUTO: 80.5 % — HIGH (ref 42.2–75.2)
NRBC # BLD: 0 /100 WBCS — SIGNIFICANT CHANGE UP (ref 0–0)
NRBC # BLD: 0 /100 WBCS — SIGNIFICANT CHANGE UP (ref 0–0)
PLATELET # BLD AUTO: 269 K/UL — SIGNIFICANT CHANGE UP (ref 130–400)
PLATELET # BLD AUTO: 269 K/UL — SIGNIFICANT CHANGE UP (ref 130–400)
PMV BLD: 11.2 FL — HIGH (ref 7.4–10.4)
PMV BLD: 11.2 FL — HIGH (ref 7.4–10.4)
POTASSIUM SERPL-MCNC: 5 MMOL/L — SIGNIFICANT CHANGE UP (ref 3.5–5)
POTASSIUM SERPL-MCNC: 5 MMOL/L — SIGNIFICANT CHANGE UP (ref 3.5–5)
POTASSIUM SERPL-SCNC: 5 MMOL/L — SIGNIFICANT CHANGE UP (ref 3.5–5)
POTASSIUM SERPL-SCNC: 5 MMOL/L — SIGNIFICANT CHANGE UP (ref 3.5–5)
PROT SERPL-MCNC: 6.6 G/DL — SIGNIFICANT CHANGE UP (ref 6–8)
PROT SERPL-MCNC: 6.6 G/DL — SIGNIFICANT CHANGE UP (ref 6–8)
RBC # BLD: 5.77 M/UL — SIGNIFICANT CHANGE UP (ref 4.7–6.1)
RBC # BLD: 5.77 M/UL — SIGNIFICANT CHANGE UP (ref 4.7–6.1)
RBC # FLD: 13.7 % — SIGNIFICANT CHANGE UP (ref 11.5–14.5)
RBC # FLD: 13.7 % — SIGNIFICANT CHANGE UP (ref 11.5–14.5)
SODIUM SERPL-SCNC: 142 MMOL/L — SIGNIFICANT CHANGE UP (ref 135–146)
SODIUM SERPL-SCNC: 142 MMOL/L — SIGNIFICANT CHANGE UP (ref 135–146)
WBC # BLD: 14.19 K/UL — HIGH (ref 4.8–10.8)
WBC # BLD: 14.19 K/UL — HIGH (ref 4.8–10.8)
WBC # FLD AUTO: 14.19 K/UL — HIGH (ref 4.8–10.8)
WBC # FLD AUTO: 14.19 K/UL — HIGH (ref 4.8–10.8)

## 2023-10-26 PROCEDURE — 93005 ELECTROCARDIOGRAM TRACING: CPT

## 2023-10-26 PROCEDURE — 83735 ASSAY OF MAGNESIUM: CPT

## 2023-10-26 PROCEDURE — 82962 GLUCOSE BLOOD TEST: CPT

## 2023-10-26 PROCEDURE — 99283 EMERGENCY DEPT VISIT LOW MDM: CPT | Mod: 25

## 2023-10-26 PROCEDURE — 99284 EMERGENCY DEPT VISIT MOD MDM: CPT

## 2023-10-26 PROCEDURE — 93010 ELECTROCARDIOGRAM REPORT: CPT

## 2023-10-26 PROCEDURE — 80053 COMPREHEN METABOLIC PANEL: CPT

## 2023-10-26 PROCEDURE — 85025 COMPLETE CBC W/AUTO DIFF WBC: CPT
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
## 2023-10-26 NOTE — ED ADULT NURSE NOTE - BEFAST SCREENING
Message   Recorded as Task   Date: 2017 12:19 PM, Created By: Jaja Livingston   Task Name: 4. Patient Message   Assigned To: CHITO KHALIL   Regarding Patient: WALTER YUAN, Status: Active   Comment:    Jaja Livingston - 29 Jun 2017 12:19 PM     Patient Message to Provider  \"REASON FOR CALL: mom would like a call back to see if she can get either a new patient appointment or post hospital visit patient is at [facility] and she will discharged tomorrow patient has been in the hospital for a month mom was told that patient has to been by monday 07/03  mom is willing to see anyone in the practice   CALLER'S RELATIONSHIP TO PATIENT: other   IF OTHER, NAME AND RELATIONSHIP: davon shah     BEST NUMBER TO BE CONTACTED: [phone]  ALTERNATIVE PHONE NUMBER: ,,,    Turnaround time given to caller: 6hrs   \"\"THIS MESSAGE WILL BE SENT TO YOUR PROVIDER, THE CLINICAL TEAM WILL RETURN YOUR CALL AS SOON AS THEY HAVE REVIEWED YOUR MESSAGE\"\"    READ BACK MESSAGE TO PATIENT\"   Iris Henry - 29 Jun 2017 2:08 PM     TASK EDITED  scheduled apt for Saturday, discharge from NICU.     Signatures   Electronically signed by : Iris Henry R.N.; Jun 29 2017  2:09PM CST    
Negative

## 2023-10-26 NOTE — ED PROVIDER NOTE - OBJECTIVE STATEMENT
patient BIBA, found in car, LOW glucose,, FS 40, Given glucose by EMS with improvement, Patient states he took his insulin today , did not eat breakfast,

## 2023-10-26 NOTE — ED ADULT NURSE NOTE - NSFALLUNIVINTERV_ED_ALL_ED
Bed/Stretcher in lowest position, wheels locked, appropriate side rails in place/Call bell, personal items and telephone in reach/Instruct patient to call for assistance before getting out of bed/chair/stretcher/Non-slip footwear applied when patient is off stretcher/[place] to call system/Physically safe environment - no spills, clutter or unnecessary equipment/Purposeful proactive rounding/Room/bathroom lighting operational, light cord in reach

## 2023-10-26 NOTE — ED PROVIDER NOTE - PATIENT PORTAL LINK FT
You can access the FollowMyHealth Patient Portal offered by [facility] by registering at the following website: [email or web address]. By joining [employer]’s FollowMyHealth portal, you will also be able to view your health information using other applications (apps) compatible with our system.

## 2023-10-26 NOTE — ED PROVIDER NOTE - RESPIRATORY, MLM
"Called and spoke to patient. She states that at 930pm last night she developed sweats, rapid heart beat and overall feeling sick. Patient then checked her blood sugar and it was 61. She ate some candy to bring it up and it would only go up to 63. 2 hours later her blood sugar came up to 88 but she still felt a rapid heart beat. She ended up falling asleep and at 9am when she checked again, it was 247 fasting. Patients feels ""the candy caught up to her\"". She denies any recent changes of oral diabetes medication or insulin. She also just had a follow up with Juanpablo Holliday on 4/14/22. Will send to triage to address.    " Breath sounds clear and equal bilaterally.

## 2023-10-26 NOTE — ED PROVIDER NOTE - CLINICAL SUMMARY MEDICAL DECISION MAKING FREE TEXT BOX
Patient observed in ED.  Patient given food.  Serial fingersticks obtained.  Patient was able to maintain blood sugar.  Patient wants to go home.  Patient will continue to monitor blood sugars at home and return if any worsening symptoms or concerns.  Patient to follow-up with PMD.

## 2023-10-27 ENCOUNTER — EMERGENCY (EMERGENCY)
Facility: HOSPITAL | Age: 68
LOS: 0 days | Discharge: HOME | End: 2023-10-27
Attending: EMERGENCY MEDICINE
Payer: MEDICARE

## 2023-10-27 VITALS
RESPIRATION RATE: 18 BRPM | SYSTOLIC BLOOD PRESSURE: 139 MMHG | TEMPERATURE: 98 F | HEART RATE: 98 BPM | OXYGEN SATURATION: 98 % | DIASTOLIC BLOOD PRESSURE: 85 MMHG | HEIGHT: 66 IN

## 2023-10-27 DIAGNOSIS — Z95.2 PRESENCE OF PROSTHETIC HEART VALVE: Chronic | ICD-10-CM

## 2023-10-27 DIAGNOSIS — Z91.118 PATIENT'S NONCOMPLIANCE WITH DIETARY REGIMEN FOR OTHER REASON: ICD-10-CM

## 2023-10-27 DIAGNOSIS — Z79.02 LONG TERM (CURRENT) USE OF ANTITHROMBOTICS/ANTIPLATELETS: ICD-10-CM

## 2023-10-27 DIAGNOSIS — E11.649 TYPE 2 DIABETES MELLITUS WITH HYPOGLYCEMIA WITHOUT COMA: ICD-10-CM

## 2023-10-27 DIAGNOSIS — Z79.4 LONG TERM (CURRENT) USE OF INSULIN: ICD-10-CM

## 2023-10-27 DIAGNOSIS — I48.91 UNSPECIFIED ATRIAL FIBRILLATION: ICD-10-CM

## 2023-10-27 DIAGNOSIS — Z79.01 LONG TERM (CURRENT) USE OF ANTICOAGULANTS: ICD-10-CM

## 2023-10-27 DIAGNOSIS — T38.3X1A POISONING BY INSULIN AND ORAL HYPOGLYCEMIC [ANTIDIABETIC] DRUGS, ACCIDENTAL (UNINTENTIONAL), INITIAL ENCOUNTER: ICD-10-CM

## 2023-10-27 DIAGNOSIS — Z95.5 PRESENCE OF CORONARY ANGIOPLASTY IMPLANT AND GRAFT: Chronic | ICD-10-CM

## 2023-10-27 DIAGNOSIS — Z98.890 OTHER SPECIFIED POSTPROCEDURAL STATES: Chronic | ICD-10-CM

## 2023-10-27 DIAGNOSIS — I10 ESSENTIAL (PRIMARY) HYPERTENSION: ICD-10-CM

## 2023-10-27 DIAGNOSIS — I25.10 ATHEROSCLEROTIC HEART DISEASE OF NATIVE CORONARY ARTERY WITHOUT ANGINA PECTORIS: ICD-10-CM

## 2023-10-27 DIAGNOSIS — E78.5 HYPERLIPIDEMIA, UNSPECIFIED: ICD-10-CM

## 2023-10-27 LAB
GLUCOSE BLDC GLUCOMTR-MCNC: 175 MG/DL — HIGH (ref 70–99)
GLUCOSE BLDC GLUCOMTR-MCNC: 175 MG/DL — HIGH (ref 70–99)

## 2023-10-27 PROCEDURE — 82962 GLUCOSE BLOOD TEST: CPT

## 2023-10-27 PROCEDURE — 99282 EMERGENCY DEPT VISIT SF MDM: CPT

## 2023-10-27 PROCEDURE — 99283 EMERGENCY DEPT VISIT LOW MDM: CPT

## 2023-10-27 NOTE — ED PROVIDER NOTE - OBJECTIVE STATEMENT
Patient is a 68-year-old male history of hypertension hyperlipidemia, CAD, diabetes, A-fib on Eliquis here for evaluation of hypoglycemia with unresponsiveness.  Patient's fingerstick was 60 was given an amp of D50 and returned to baseline.  Patient was seen here yesterday for the same pain.  Patient seems to be forgetful as per family and will take more insulin than he is prescribed.  Patient did not eat breakfast this morning

## 2023-10-27 NOTE — ED PROVIDER NOTE - ATTENDING APP SHARED VISIT CONTRIBUTION OF CARE
Patient is a 68-year-old male returning to ED today after episode of hypoglycemia down to 60.  Yesterday had some more symptoms that resolved with a meal.  EMS gave dextrose and patient eating now asymptomatic.    Exam: Soft nontender abdomen, nonfocal neuro exam, no acute distress  Plan: Fingerstick, discuss safe disposition with family

## 2023-10-27 NOTE — ED ADULT NURSE NOTE - CHIEF COMPLAINT QUOTE
BIBA via [facility] from home- ems called by son, pt was unresponsive on EMS arrival, blood glucose was 60, given 1amp D50, and responded to treatment, on arrival to ED patient alert/oriented x 4

## 2023-10-27 NOTE — ED ADULT NURSE NOTE - NSHOSCREENINGQ1_ED_ALL_ED
ED md bedside to assess patient with neurologist via telestroke hub. Belarusian interpretor used.pt's daughter dangelo bedside.   No

## 2023-10-27 NOTE — ED PROVIDER NOTE - PATIENT PORTAL LINK FT
You can access the FollowMyHealth Patient Portal offered by [facility] by registering at the following website: [email or web address]. By joining [employer]’s FollowMyHealth portal, you will also be able to view your health information using other applications (apps) compatible with our system.

## 2023-10-27 NOTE — ED PROVIDER NOTE - PHYSICAL EXAMINATION
VITAL SIGNS: I have reviewed nursing notes and confirm.  CONSTITUTIONAL: chronically ill-appearing  SKIN: skin exam is warm and dry, no acute rash.    HEAD: Normocephalic; atraumatic.  EYES: conjunctiva and sclera clear.  ENT: No nasal discharge; airway clear.  CARD: S1, S2 normal; no murmurs, gallops, or rubs. Regular rate and rhythm.   RESP: No wheezes, rales or rhonchi.  EXT: Normal ROM.  No clubbing, cyanosis or edema.   NEURO: Alert, oriented, grossly unremarkable

## 2023-10-27 NOTE — ED PROVIDER NOTE - CLINICAL SUMMARY MEDICAL DECISION MAKING FREE TEXT BOX
d/w family need for careful med administration - family to be educated on use of injection pens - [place] home

## 2023-10-27 NOTE — ED PROVIDER NOTE - CARE PROVIDER_API CALL
Chhaya Martin  Internal Medicine  [address] [place] [id number]  Phone: [phone]  Fax: [phone]  Established Patient  Follow Up Time: Urgent

## 2023-10-27 NOTE — ED ADULT TRIAGE NOTE - CHIEF COMPLAINT QUOTE
BIBA via [facility] from home- ems called by son, pt was unresponsive on EMS arrival, blood glucose was 60, given 1amp D50, and responded to treatment, on arrival to ED patient alert/oriented x 4

## 2023-11-14 ENCOUNTER — APPOINTMENT (OUTPATIENT)
Dept: CARDIOLOGY | Facility: CLINIC | Age: 68
End: 2023-11-14
Payer: MEDICARE

## 2023-11-14 VITALS
BODY MASS INDEX: 34.73 KG/M2 | HEART RATE: 100 BPM | WEIGHT: 196 LBS | DIASTOLIC BLOOD PRESSURE: 80 MMHG | TEMPERATURE: 97.6 F | HEIGHT: 63 IN | SYSTOLIC BLOOD PRESSURE: 110 MMHG

## 2023-11-14 DIAGNOSIS — I35.9 NONRHEUMATIC AORTIC VALVE DISORDER, UNSPECIFIED: ICD-10-CM

## 2023-11-14 DIAGNOSIS — I25.10 ATHEROSCLEROTIC HEART DISEASE OF NATIVE CORONARY ARTERY W/OUT ANGINA PECTORIS: ICD-10-CM

## 2023-11-14 PROCEDURE — 93000 ELECTROCARDIOGRAM COMPLETE: CPT

## 2023-11-14 PROCEDURE — 99214 OFFICE O/P EST MOD 30 MIN: CPT | Mod: 25

## 2023-11-14 RX ORDER — DULAGLUTIDE 4.5 MG/.5ML
INJECTION, SOLUTION SUBCUTANEOUS
Refills: 0 | Status: DISCONTINUED | COMMUNITY
End: 2023-11-14

## 2023-11-15 ENCOUNTER — NON-APPOINTMENT (OUTPATIENT)
Age: 68
End: 2023-11-15

## 2023-11-15 ENCOUNTER — APPOINTMENT (OUTPATIENT)
Dept: CARDIOLOGY | Facility: CLINIC | Age: 68
End: 2023-11-15
Payer: MEDICARE

## 2023-11-15 PROCEDURE — 93295 DEV INTERROG REMOTE 1/2/MLT: CPT

## 2023-11-15 PROCEDURE — 93296 REM INTERROG EVL PM/IDS: CPT

## 2023-11-21 ENCOUNTER — APPOINTMENT (OUTPATIENT)
Dept: NEUROLOGY | Facility: CLINIC | Age: 68
End: 2023-11-21
Payer: MEDICARE

## 2023-11-21 VITALS
HEIGHT: 63 IN | TEMPERATURE: 98.7 F | HEART RATE: 78 BPM | SYSTOLIC BLOOD PRESSURE: 140 MMHG | WEIGHT: 190 LBS | DIASTOLIC BLOOD PRESSURE: 89 MMHG | BODY MASS INDEX: 33.66 KG/M2

## 2023-11-21 DIAGNOSIS — R26.81 UNSTEADINESS ON FEET: ICD-10-CM

## 2023-11-21 DIAGNOSIS — Z79.4 TYPE 2 DIABETES MELLITUS WITH OTHER DIABETIC NEUROLOGICAL COMPLICATION: ICD-10-CM

## 2023-11-21 DIAGNOSIS — E11.49 TYPE 2 DIABETES MELLITUS WITH OTHER DIABETIC NEUROLOGICAL COMPLICATION: ICD-10-CM

## 2023-11-21 PROCEDURE — 99203 OFFICE O/P NEW LOW 30 MIN: CPT

## 2023-11-22 ENCOUNTER — APPOINTMENT (OUTPATIENT)
Dept: NEUROLOGY | Facility: CLINIC | Age: 68
End: 2023-11-22

## 2024-02-14 ENCOUNTER — APPOINTMENT (OUTPATIENT)
Dept: ELECTROPHYSIOLOGY | Facility: CLINIC | Age: 69
End: 2024-02-14
Payer: MEDICARE

## 2024-02-14 VITALS
HEART RATE: 112 BPM | RESPIRATION RATE: 18 BRPM | WEIGHT: 192 LBS | TEMPERATURE: 97.8 F | BODY MASS INDEX: 34.01 KG/M2 | DIASTOLIC BLOOD PRESSURE: 90 MMHG | SYSTOLIC BLOOD PRESSURE: 141 MMHG

## 2024-02-14 DIAGNOSIS — Z86.73 PERSONAL HISTORY OF TRANSIENT ISCHEMIC ATTACK (TIA), AND CEREBRAL INFARCTION W/OUT RESIDUAL DEFICITS: ICD-10-CM

## 2024-02-14 PROCEDURE — 99215 OFFICE O/P EST HI 40 MIN: CPT | Mod: 25

## 2024-02-14 PROCEDURE — 93290 INTERROG DEV EVAL ICPMS IP: CPT | Mod: 26

## 2024-02-14 PROCEDURE — 93284 PRGRMG EVAL IMPLANTABLE DFB: CPT

## 2024-02-14 PROCEDURE — 93000 ELECTROCARDIOGRAM COMPLETE: CPT | Mod: 59

## 2024-02-14 RX ORDER — FENOFIBRATE 145 MG/1
145 TABLET, COATED ORAL
Qty: 90 | Refills: 3 | Status: ACTIVE | COMMUNITY

## 2024-02-14 RX ORDER — ATORVASTATIN CALCIUM 40 MG/1
40 TABLET, FILM COATED ORAL DAILY
Refills: 0 | Status: ACTIVE | COMMUNITY

## 2024-02-14 RX ORDER — DULAGLUTIDE 4.5 MG/.5ML
4.5 INJECTION, SOLUTION SUBCUTANEOUS
Refills: 0 | Status: ACTIVE | COMMUNITY
Start: 2024-02-14

## 2024-02-14 RX ORDER — INSULIN DEGLUDEC INJECTION 200 U/ML
200 INJECTION, SOLUTION SUBCUTANEOUS
Qty: 45 | Refills: 0 | Status: ACTIVE | COMMUNITY
Start: 2020-11-01

## 2024-02-14 RX ORDER — METFORMIN HYDROCHLORIDE 500 MG/1
500 TABLET, COATED ORAL DAILY
Refills: 0 | Status: DISCONTINUED | COMMUNITY
End: 2024-02-14

## 2024-02-14 RX ORDER — FUROSEMIDE 40 MG/1
40 TABLET ORAL DAILY
Refills: 0 | Status: ACTIVE | COMMUNITY

## 2024-02-14 RX ORDER — METOPROLOL SUCCINATE 200 MG/1
200 TABLET, EXTENDED RELEASE ORAL
Qty: 1 | Refills: 0 | Status: ACTIVE | COMMUNITY

## 2024-02-14 RX ORDER — APIXABAN 5 MG/1
5 TABLET, FILM COATED ORAL
Qty: 180 | Refills: 3 | Status: ACTIVE | COMMUNITY
Start: 2024-02-14

## 2024-02-14 RX ORDER — NITROGLYCERIN 0.4 MG/1
0.4 TABLET SUBLINGUAL
Refills: 0 | Status: ACTIVE | COMMUNITY

## 2024-02-14 RX ORDER — METFORMIN HYDROCHLORIDE 500 MG/1
500 TABLET, COATED ORAL
Refills: 0 | Status: ACTIVE | COMMUNITY
Start: 2024-02-14

## 2024-02-14 RX ORDER — INSULIN ASPART 100 [IU]/ML
100 INJECTION, SOLUTION INTRAVENOUS; SUBCUTANEOUS
Qty: 15 | Refills: 0 | Status: COMPLETED | COMMUNITY
Start: 2020-08-18 | End: 2024-02-14

## 2024-02-14 RX ORDER — ESZOPICLONE 3 MG/1
3 TABLET, FILM COATED ORAL
Refills: 0 | Status: ACTIVE | COMMUNITY

## 2024-02-14 RX ORDER — EZETIMIBE 10 MG/1
10 TABLET ORAL
Qty: 90 | Refills: 3 | Status: ACTIVE | COMMUNITY
Start: 2020-10-16

## 2024-02-14 RX ORDER — SACUBITRIL AND VALSARTAN 24; 26 MG/1; MG/1
24-26 TABLET, FILM COATED ORAL DAILY
Refills: 0 | Status: COMPLETED | COMMUNITY
End: 2024-02-14

## 2024-02-14 RX ORDER — CLOPIDOGREL BISULFATE 75 MG/1
75 TABLET, FILM COATED ORAL DAILY
Refills: 0 | Status: ACTIVE | COMMUNITY

## 2024-02-14 NOTE — CARDIOLOGY SUMMARY
[de-identified] : 2/14/2024: AFib with RVR ( bpm), BiV paced ( msec) 8/16/2023 - 89 bpm a fib, BI paced , QRS 130ms 11/16/2022 - 78 bpm Bi V paced   2/23/2022 A-sensed, BiV paced (HR 82 bpm) [de-identified] : 5/25/2023 EF 35-40%. Grade 3 DD. Mod LAE. Peak/Mean AV gradient 27/15 mmHg.  9/15/21 EF 35%. Elevated LVEDP. Mild LAE. Bioprosthesis in aortic position. Normal structure and function of aortic prostehsis.  1/7/20, Mod LAE ,mild aortic root dilatation. LVEF 30-35%.  [de-identified] : [employer] BI V ICD 2/5/2020 [de-identified] : 6/15/2021 MANN to 70% mid LAD. Mod-severe to pulm HTN. \par  10/2/19, 2v CAD (LAD and RCA). Prox LAD with 85% stenosis s/p PCI, 85% 1st Diag s/p PCI, 80% mid LAD stenosis s/p PCI

## 2024-02-14 NOTE — HISTORY OF PRESENT ILLNESS
[de-identified] : PCP Dr. Ceballos Cardiologist Dr. Lamar --> Dr. Wells  70 yo M former  with history of syncope, HTN, paroxysmal AFib with RVR, CAD s/p PCI, ICM, aortic valve replacement, s/p BiV ICD Feb 2020.   8/2023 He complains of dyspnea on exertion radha when climbing stairs. He still smokes about a pack per day and is not interested in quitting. He takes his Eliquis once a day. He remains resistant about taking the eliquis every 12 hours despite explanation.   2/2024 He is here for routine follow up visit. Admits to fatigue for many months, thinks it started around 6 mo ago. States he had a CVA in Sept. Since then, he has been taking Eliquis BID. Admits to dyspnea and occasional palpitations. Denies chest pain, dizziness, lightheadedness, presyncope or syncope.

## 2024-02-14 NOTE — PHYSICAL EXAM
[Normal Appearance] : normal appearance [Well Groomed] : well groomed [No Deformities] : no deformities [General Appearance - In No Acute Distress] : no acute distress [Heart Sounds] : normal S1 and S2 [Murmurs] : no murmurs present [FreeTextEntry1] : tachycardic, irregular rate and rhythm [] : no respiratory distress [Respiration, Rhythm And Depth] : normal respiratory rhythm and effort [Exaggerated Use Of Accessory Muscles For Inspiration] : no accessory muscle use [Auscultation Breath Sounds / Voice Sounds] : lungs were clear to auscultation bilaterally [Left Infraclavicular] : left infraclavicular area [Well-Healed] : well-healed [Abdomen Soft] : soft [Nail Clubbing] : no clubbing of the fingernails

## 2024-02-14 NOTE — ASSESSMENT
[FreeTextEntry1] : 68 yo M with history of NIDCM s/p BiV ICD (2/5/20), paroxysmal AFib on Eliquis who presents for routine cardiac follow up visit.   # NIDCM - BiV ICD with interrogation as listed above. Normal functioning device. NSVT noted. Cath in 2021 with PCI.  - Remote monitor is set up and patient is transmitting.   - Stable Optivol. Currently euvolemic and no clinical s/sx of heart failure.  - Cont GDMT  # Paroxysmal AFib with RVR - Advised pt on taking Eliquis 5 mg PO BID for CHADS VASc at least 7 (CHF, HTN, Age > 65, DM, CAD, CVA).  No signs/symptoms of bleeding.  - Discussed option of cardioversion as well as atrial fibrillation ablation especially since pt has history of heart failure. Based on Sanders AF study, pt would benefit from AFib ablation. Discussed risks/benefits/alternatives of ablation. Patient was initially agreeable to ablation but then retracted and became belligerent. He yelled, "let's scrap everything if I have to get bloodwork. You'll have to take my arm before getting my blood." I offered cardioversion as well and pt responded, "I'll agree to it if it means taking my life."  - Patient refusing pulm referral   - Cont Metoprolol Succ  mg BID (unclear if pt is taking this dose as he did not bring in a list of his meds an does not know what he is actually taking).   # Tobacco Use - Strongly advised tobacco /cigarette smoking cessation but he refused  # HTN - BP mildly elevated - 2g Na diet enforced  I have also advised the patient to go to the nearest emergency room if he experiences any chest pain, dyspnea, syncope, or has any other compelling symptoms.  Follow up in 6 months with ACP or sooner if he wishes to pursue ablation or cardioversion.

## 2024-02-14 NOTE — PROCEDURE
[No] : not [Atrial Fibrillation] : atrial fibrillation [CRT-D] : Cardiac resynchronization therapy defibrillator [DDD] : DDD [Voltage: ___ volts] : Voltage was [unfilled] volts [Charge Time: ___ sec] : charge time was [unfilled] seconds [Longevity: ___ months] : The estimated remaining battery life is [unfilled] months [Threshold Testing Performed] : Threshold testing was performed [Ventricular] : Ventricular [Lead Imp:  ___ohms] : lead impedance was [unfilled] ohms [Sensing Amplitude ___mv] : sensing amplitude was [unfilled] mv [___V @] : [unfilled] V [___ ms] : [unfilled] ms [Programmed for Longevity] : output reprogrammed for improved battery longevity [Asense-Vsense ___ %] : Asense-Vsense [unfilled]% [Asense-Vpace ___ %] : Asense-Vpace [unfilled]% [Apace-Vsense ___ %] : Apace-Vsense [unfilled]% [Apace-Vpace ___ %] : Apace-Vpace [unfilled]% [See Device Printout] : See device printout [de-identified] :  BPM [de-identified] : MONICA [de-identified] : Jabari MRI Quad [id number] [de-identified] : [id number] [de-identified] : 2/5/2020 [de-identified] :  [de-identified] : Effective CRT 72.9% AF since 8/2023 [place] 100% 6 AF episodes with -188 BPM on 12/7/2023 Transmitting on [facility]

## 2024-02-14 NOTE — DISCUSSION/SUMMARY
[AICD Function Normal] : normal AICD function [FreeTextEntry1] : We had an extensive conversation regarding the nature of atrial fibrillation, including potential etiologies, underlying pathophysiology and natural history of the disease. In addition, the potential risk of thromboembolic events and assessment of that risk were discussed. I have emphasized the importance of continuing anticoagulation.   In addition, the maintenance of sinus rhythm along with adjuvant antiarrhythmic agents and catheter ablation therapy were discussed. The rationale for and risks of ablation therapy were discussed, including but not limited to bleeding, vascular injury, groin complications, cardiac perforation and tamponade, stroke, esophageal injury, pulmonary vein stenosis, need for pacemaker, need for cardiac surgery, and death. In addition, the long-term and ongoing nature of this therapy were also discussed, including the critical role of continued monitoring post-ablation and the potential for the necessity of repeat ablation procedures to definitively treat the condition.   The patient verbalized understanding of the discussion and all questions were addressed and answered. The patient would like to AVOID ablation or cardioversion.

## 2024-03-28 ENCOUNTER — RESULT REVIEW (OUTPATIENT)
Age: 69
End: 2024-03-28

## 2024-03-28 ENCOUNTER — OUTPATIENT (OUTPATIENT)
Dept: OUTPATIENT SERVICES | Facility: HOSPITAL | Age: 69
LOS: 1 days | End: 2024-03-28
Payer: MEDICARE

## 2024-03-28 VITALS
OXYGEN SATURATION: 98 % | RESPIRATION RATE: 20 BRPM | SYSTOLIC BLOOD PRESSURE: 140 MMHG | WEIGHT: 195.99 LBS | HEART RATE: 96 BPM | HEIGHT: 63 IN | TEMPERATURE: 98 F | DIASTOLIC BLOOD PRESSURE: 76 MMHG

## 2024-03-28 DIAGNOSIS — Z95.2 PRESENCE OF PROSTHETIC HEART VALVE: Chronic | ICD-10-CM

## 2024-03-28 DIAGNOSIS — Z95.5 PRESENCE OF CORONARY ANGIOPLASTY IMPLANT AND GRAFT: Chronic | ICD-10-CM

## 2024-03-28 DIAGNOSIS — I48.19 OTHER PERSISTENT ATRIAL FIBRILLATION: ICD-10-CM

## 2024-03-28 DIAGNOSIS — Z98.890 OTHER SPECIFIED POSTPROCEDURAL STATES: Chronic | ICD-10-CM

## 2024-03-28 DIAGNOSIS — Z01.818 ENCOUNTER FOR OTHER PREPROCEDURAL EXAMINATION: ICD-10-CM

## 2024-03-28 LAB
ALBUMIN SERPL ELPH-MCNC: 4.4 G/DL — SIGNIFICANT CHANGE UP (ref 3.5–5.2)
ALP SERPL-CCNC: 40 U/L — SIGNIFICANT CHANGE UP (ref 30–115)
ALT FLD-CCNC: 19 U/L — SIGNIFICANT CHANGE UP (ref 0–41)
ANION GAP SERPL CALC-SCNC: 11 MMOL/L — SIGNIFICANT CHANGE UP (ref 7–14)
AST SERPL-CCNC: 19 U/L — SIGNIFICANT CHANGE UP (ref 0–41)
BASOPHILS # BLD AUTO: 0.09 K/UL — SIGNIFICANT CHANGE UP (ref 0–0.2)
BASOPHILS NFR BLD AUTO: 0.8 % — SIGNIFICANT CHANGE UP (ref 0–1)
BILIRUB SERPL-MCNC: 0.5 MG/DL — SIGNIFICANT CHANGE UP (ref 0.2–1.2)
BUN SERPL-MCNC: 21 MG/DL — HIGH (ref 10–20)
CALCIUM SERPL-MCNC: 10.1 MG/DL — SIGNIFICANT CHANGE UP (ref 8.4–10.5)
CHLORIDE SERPL-SCNC: 101 MMOL/L — SIGNIFICANT CHANGE UP (ref 98–110)
CO2 SERPL-SCNC: 28 MMOL/L — SIGNIFICANT CHANGE UP (ref 17–32)
CREAT SERPL-MCNC: 1.4 MG/DL — SIGNIFICANT CHANGE UP (ref 0.7–1.5)
EGFR: 54 ML/MIN/1.73M2 — LOW
EOSINOPHIL # BLD AUTO: 0.29 K/UL — SIGNIFICANT CHANGE UP (ref 0–0.7)
EOSINOPHIL NFR BLD AUTO: 2.7 % — SIGNIFICANT CHANGE UP (ref 0–8)
GLUCOSE SERPL-MCNC: 203 MG/DL — HIGH (ref 70–99)
HCT VFR BLD CALC: 55.9 % — HIGH (ref 42–52)
HGB BLD-MCNC: 18.4 G/DL — HIGH (ref 14–18)
IMM GRANULOCYTES NFR BLD AUTO: 0.6 % — HIGH (ref 0.1–0.3)
LYMPHOCYTES # BLD AUTO: 19.7 % — LOW (ref 20.5–51.1)
LYMPHOCYTES # BLD AUTO: 2.11 K/UL — SIGNIFICANT CHANGE UP (ref 1.2–3.4)
MCHC RBC-ENTMCNC: 29.9 PG — SIGNIFICANT CHANGE UP (ref 27–31)
MCHC RBC-ENTMCNC: 32.9 G/DL — SIGNIFICANT CHANGE UP (ref 32–37)
MCV RBC AUTO: 90.9 FL — SIGNIFICANT CHANGE UP (ref 80–94)
MONOCYTES # BLD AUTO: 0.74 K/UL — HIGH (ref 0.1–0.6)
MONOCYTES NFR BLD AUTO: 6.9 % — SIGNIFICANT CHANGE UP (ref 1.7–9.3)
NEUTROPHILS # BLD AUTO: 7.43 K/UL — HIGH (ref 1.4–6.5)
NEUTROPHILS NFR BLD AUTO: 69.3 % — SIGNIFICANT CHANGE UP (ref 42.2–75.2)
NRBC # BLD: 0 /100 WBCS — SIGNIFICANT CHANGE UP (ref 0–0)
PLATELET # BLD AUTO: 250 K/UL — SIGNIFICANT CHANGE UP (ref 130–400)
PMV BLD: 12.7 FL — HIGH (ref 7.4–10.4)
POTASSIUM SERPL-MCNC: 5.2 MMOL/L — HIGH (ref 3.5–5)
POTASSIUM SERPL-SCNC: 5.2 MMOL/L — HIGH (ref 3.5–5)
PROT SERPL-MCNC: 6.8 G/DL — SIGNIFICANT CHANGE UP (ref 6–8)
RBC # BLD: 6.15 M/UL — HIGH (ref 4.7–6.1)
RBC # FLD: 14.2 % — SIGNIFICANT CHANGE UP (ref 11.5–14.5)
SODIUM SERPL-SCNC: 140 MMOL/L — SIGNIFICANT CHANGE UP (ref 135–146)
WBC # BLD: 10.72 K/UL — SIGNIFICANT CHANGE UP (ref 4.8–10.8)
WBC # FLD AUTO: 10.72 K/UL — SIGNIFICANT CHANGE UP (ref 4.8–10.8)

## 2024-03-28 PROCEDURE — 71046 X-RAY EXAM CHEST 2 VIEWS: CPT

## 2024-03-28 PROCEDURE — 36415 COLL VENOUS BLD VENIPUNCTURE: CPT

## 2024-03-28 PROCEDURE — 99214 OFFICE O/P EST MOD 30 MIN: CPT | Mod: 25

## 2024-03-28 PROCEDURE — 93010 ELECTROCARDIOGRAM REPORT: CPT

## 2024-03-28 PROCEDURE — 80053 COMPREHEN METABOLIC PANEL: CPT

## 2024-03-28 PROCEDURE — 85025 COMPLETE CBC W/AUTO DIFF WBC: CPT

## 2024-03-28 PROCEDURE — 71046 X-RAY EXAM CHEST 2 VIEWS: CPT | Mod: 26

## 2024-03-28 PROCEDURE — 93005 ELECTROCARDIOGRAM TRACING: CPT

## 2024-03-28 RX ORDER — OMEGA-3 ACID ETHYL ESTERS 1 G
1 CAPSULE ORAL
Qty: 360 | Refills: 0 | DISCHARGE

## 2024-03-28 NOTE — H&P PST ADULT - TEMPERATURE IN FAHRENHEIT (DEGREES F)
SUGGEST A REGULAR TEXTURE DIET WITH THIN LIQUID CONSISTENCIES AS THE PATIENT TOLERATES THESE FOOD TYPES FROM AN OROPHARYNGEAL SWALLOWING STANCE ON CLINICAL EXAM.
97.8

## 2024-03-28 NOTE — H&P PST ADULT - NSICDXPASTMEDICALHX_GEN_ALL_CORE_FT
PAST MEDICAL HISTORY:  Afib     CAD (coronary artery disease)     CHF (congestive heart failure)     Chronic obstructive pulmonary disease (COPD)     CVA (cerebral vascular accident)     Diabetes     Dyslipidemia     GERD (gastroesophageal reflux disease)     HTN (hypertension)     Stented coronary artery

## 2024-03-28 NOTE — H&P PST ADULT - HISTORY OF PRESENT ILLNESS
pt poor historian  pt with afib and does not know what dr medel wants to do       PATIENT CURRENTLY DENIES CHEST PAIN  SHORTNESS OF BREATH  PALPITATIONS,  DYSURIA, OR UPPER RESPIRATORY INFECTION IN PAST 2 WEEKS  denies travel outside the [place] in the past 30 days    Anesthesia Alert  NO--Difficult Airway  NO--History of neck surgery or radiation  NO--Limited ROM of neck  NO--History of Malignant hyperthermia  NO--No personal or family history of Pseudocholinesterase deficiency.  NO--Prior Anesthesia Complication  NO--Latex Allergy  NO--Loose teeth  NO--History of Rheumatoid Arthritis  Bleeding risk eliquis/plavix  NO--LAW  NO--Other_____    PT DENIES ANY RASHES, ABRASION, OR OPEN WOUNDS OR CUTS    AS PER THE PT, THIS IS HIS/HER COMPLETE MEDICAL AND SURGICAL HX, INCLUDING MEDICATIONS PRESCRIBED AND OVER THE COUNTER    Patient verbalized understanding of instructions and was given the opportunity to ask questions and have them answered.    pt denies any suicidal ideation or thoughts, pt states not a threat to self or others    Other persistent atrial fibrillation    Encounter for other preprocedural examination    CHF (congestive heart failure)    Diabetes    CAD (coronary artery disease)    Chronic obstructive pulmonary disease (COPD)    HTN (hypertension)    Stented coronary artery    Dyslipidemia    GERD (gastroesophageal reflux disease)    Afib    H/O heart artery stent    Heart valve replaced    History of Nissen fundoplication    [id number]    Revised Cardiac Risk Index for Pre-Operative Risk from [employer]  on 3/28/2024  ** All calculations should be rechecked by clinician prior to use **    RESULT SUMMARY:  4 points  Class IV Risk    15.0 %  30-day risk of death, MI, or cardiac arrest    From [place]arley 2017. These numbers are higher than those from the original study (Tutu 1999). See Evidence for details.      INPUTS:  Elevated-risk surgery —> 1 = Yes  History of ischemic heart disease —> 1 = Yes  History of congestive heart failure —> 1 = Yes  History of cerebrovascular disease —> 0 = No  Pre-operative treatment with insulin —> 1 = Yes  Pre-operative creatinine >2 mg/dL / 176.8 µmol/L —> 0 = No    Duke Activity Status Index (DASI) from [employer]     RESULT SUMMARY:  24.2 points  The higher the score (maximum 58.2), the higher the functional status.    5.72 METs        INPUTS:  Take care of self —> 2.75 = Yes  Walk indoors —> 1.75 = Yes  Walk 1&ndash;2 blocks on level ground —> 2.75 = Yes  Climb a flight of stairs or walk up a hill —> 5.5 = Yes  Run a short distance —> 0 = No  Do light work around the house —> 2.7 = Yes  Do moderate work around the house —> 3.5 = Yes  Do heavy work around the house —> 0 = No  Do yardwork —> 0 = No  Have sexual relations —> 5.25 = Yes  Participate in moderate recreational activities —> 0 = No  Participate in strenuous sports —> 0 = No

## 2024-03-28 NOTE — H&P PST ADULT - REASON FOR ADMISSION
Patient is a  69 year old  male presenting to PAST in preparation for AF ABLATION KUSH RF QDOT  on  4/25/24 under general anesthesia by Dr. medel

## 2024-03-29 DIAGNOSIS — Z01.818 ENCOUNTER FOR OTHER PREPROCEDURAL EXAMINATION: ICD-10-CM

## 2024-03-29 DIAGNOSIS — I48.19 OTHER PERSISTENT ATRIAL FIBRILLATION: ICD-10-CM

## 2024-04-25 ENCOUNTER — APPOINTMENT (OUTPATIENT)
Dept: ELECTROPHYSIOLOGY | Facility: HOSPITAL | Age: 69
End: 2024-04-25

## 2024-04-25 ENCOUNTER — TRANSCRIPTION ENCOUNTER (OUTPATIENT)
Age: 69
End: 2024-04-25

## 2024-04-25 ENCOUNTER — OUTPATIENT (OUTPATIENT)
Dept: OUTPATIENT SERVICES | Facility: HOSPITAL | Age: 69
LOS: 1 days | End: 2024-04-25
Payer: MEDICARE

## 2024-04-25 DIAGNOSIS — Z98.890 OTHER SPECIFIED POSTPROCEDURAL STATES: Chronic | ICD-10-CM

## 2024-04-25 DIAGNOSIS — Z95.2 PRESENCE OF PROSTHETIC HEART VALVE: Chronic | ICD-10-CM

## 2024-04-25 DIAGNOSIS — Z95.5 PRESENCE OF CORONARY ANGIOPLASTY IMPLANT AND GRAFT: Chronic | ICD-10-CM

## 2024-04-25 PROCEDURE — 93656 COMPRE EP EVAL ABLTJ ATR FIB: CPT

## 2024-04-25 PROCEDURE — 82962 GLUCOSE BLOOD TEST: CPT

## 2024-04-25 RX ORDER — FUROSEMIDE 40 MG
1 TABLET ORAL
Refills: 0 | DISCHARGE

## 2024-04-25 RX ORDER — INSULIN ASPART 100 [IU]/ML
8 INJECTION, SOLUTION SUBCUTANEOUS
Qty: 0 | Refills: 0 | DISCHARGE

## 2024-04-25 RX ORDER — EZETIMIBE 10 MG/1
1 TABLET ORAL
Refills: 0 | DISCHARGE

## 2024-04-25 RX ORDER — METOPROLOL TARTRATE 50 MG
1 TABLET ORAL
Qty: 0 | Refills: 0 | DISCHARGE

## 2024-04-25 RX ORDER — ATORVASTATIN CALCIUM 80 MG/1
1 TABLET, FILM COATED ORAL
Qty: 0 | Refills: 0 | DISCHARGE

## 2024-04-25 RX ORDER — SACUBITRIL AND VALSARTAN 24; 26 MG/1; MG/1
1 TABLET, FILM COATED ORAL
Qty: 0 | Refills: 0 | DISCHARGE

## 2024-04-25 RX ORDER — INSULIN DEGLUDEC 100 U/ML
28 INJECTION, SOLUTION SUBCUTANEOUS
Refills: 0 | DISCHARGE

## 2024-04-25 RX ORDER — ESZOPICLONE 2 MG/1
1 TABLET, COATED ORAL
Refills: 0 | DISCHARGE

## 2024-04-25 RX ORDER — CLOPIDOGREL BISULFATE 75 MG/1
1 TABLET, FILM COATED ORAL
Qty: 0 | Refills: 0 | DISCHARGE

## 2024-04-25 RX ORDER — FENOFIBRATE,MICRONIZED 130 MG
1 CAPSULE ORAL
Refills: 0 | DISCHARGE

## 2024-04-26 PROBLEM — I63.9 CEREBRAL INFARCTION, UNSPECIFIED: Chronic | Status: ACTIVE | Noted: 2024-03-28

## 2024-05-01 DIAGNOSIS — I48.91 UNSPECIFIED ATRIAL FIBRILLATION: ICD-10-CM

## 2024-05-02 DIAGNOSIS — I48.91 UNSPECIFIED ATRIAL FIBRILLATION: ICD-10-CM

## 2024-05-09 ENCOUNTER — APPOINTMENT (OUTPATIENT)
Dept: CARDIOLOGY | Facility: CLINIC | Age: 69
End: 2024-05-09

## 2024-05-22 ENCOUNTER — APPOINTMENT (OUTPATIENT)
Dept: ELECTROPHYSIOLOGY | Facility: CLINIC | Age: 69
End: 2024-05-22
Payer: MEDICARE

## 2024-05-22 VITALS
DIASTOLIC BLOOD PRESSURE: 60 MMHG | WEIGHT: 203 LBS | HEART RATE: 90 BPM | SYSTOLIC BLOOD PRESSURE: 112 MMHG | TEMPERATURE: 98 F | HEIGHT: 63 IN | BODY MASS INDEX: 35.97 KG/M2

## 2024-05-22 PROCEDURE — 93290 INTERROG DEV EVAL ICPMS IP: CPT | Mod: 26

## 2024-05-22 PROCEDURE — 99215 OFFICE O/P EST HI 40 MIN: CPT | Mod: 25

## 2024-05-22 PROCEDURE — 93284 PRGRMG EVAL IMPLANTABLE DFB: CPT

## 2024-05-22 PROCEDURE — G2211 COMPLEX E/M VISIT ADD ON: CPT

## 2024-05-22 PROCEDURE — 93000 ELECTROCARDIOGRAM COMPLETE: CPT | Mod: 59

## 2024-05-22 RX ORDER — OMEGA-3/DHA/EPA/FISH OIL 300-1000MG
1000 CAPSULE ORAL
Refills: 0 | Status: COMPLETED | COMMUNITY
End: 2024-05-22

## 2024-05-22 NOTE — CARDIOLOGY SUMMARY
[de-identified] : 5/22/2024: AFib, V-paced (HR 90 bpm) 2/14/2024: AFib with RVR ( bpm), BiV paced ( msec) 8/16/2023 - 89 bpm AFib, BiV paced , QRS 130ms 11/16/2022 - 78 bpm Bi V paced   2/23/2022 A-sensed, BiV paced (HR 82 bpm) [de-identified] : 5/25/2023 EF 35-40%. Grade 3 DD. Mod LAE. Peak/Mean AV gradient 27/15 mmHg.  9/15/21 EF 35%. Elevated LVEDP. Mild LAE. Bioprosthesis in aortic position. Normal structure and function of aortic prostehsis.  1/7/20, Mod LAE ,mild aortic root dilatation. LVEF 30-35%.  [de-identified] : Medtronic BIV ICD 2/5/2020 [de-identified] : 6/15/2021 MANN to 70% mid LAD. Mod-severe to pulm HTN. \par  10/2/19, 2v CAD (LAD and RCA). Prox LAD with 85% stenosis s/p PCI, 85% 1st Diag s/p PCI, 80% mid LAD stenosis s/p PCI

## 2024-05-22 NOTE — PROCEDURE
[Atrial Fibrillation] : atrial fibrillation [CRT-D] : Cardiac resynchronization therapy defibrillator [DDDR] : DDDR [Voltage: ___ volts] : Voltage was [unfilled] volts [Longevity: ___ months] : The estimated remaining battery life is [unfilled] months [Sensing Amplitude ___mv] : sensing amplitude was [unfilled] mv [Lead Imp:  ___ohms] : lead impedance was [unfilled] ohms [___V @] : [unfilled] V [___ ms] : [unfilled] ms [None] : none [See Device Printout] : See device printout [Programmed for Longevity] : output reprogrammed for improved battery longevity [de-identified] : Medtronic [de-identified] : [id number] [de-identified] : [id number] [de-identified] : 2/5/2020 [de-identified] : mode Switch to DDIR [de-identified] :  [de-identified] : SHAQ & Christina are programmed on [de-identified] : AP-4% -71% in AF since 4/26 Increased fluid

## 2024-05-22 NOTE — DISCUSSION/SUMMARY
[AICD Function Normal] : normal AICD function [FreeTextEntry1] : I discussed with patient the different management strategies of atrial fibrillation including rate control, rhythm control and ablative therapy. I also discussed with the patient in great length the role of anticoagulation in stroke prevention. Patient expressed understanding of the discussion. Patient is interested in restoring sinus rhythm through DC cardioversion.   I explained that he will need a KUSH prior to cardioversion to rule out thrombus. I discussed risks and benefits of DC cardioversion including risk of aspiration, skin burn, stroke, and cardiac arrest. He expressed understanding and would like to proceed with cardioversion. My office staff will contact patient the patient with date, time and instructions.

## 2024-05-22 NOTE — HISTORY OF PRESENT ILLNESS
[de-identified] : PCP Dr. Ceballos Cardiologist Dr. Lamar --> Dr. 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   8/2023 He complains of dyspnea on exertion radha when climbing stairs. He still smokes about a pack per day and is not interested in quitting. He takes his Eliquis once a day. He remains resistant about taking the eliquis every 12 hours despite explanation.   2/2024 He is here for routine follow up visit. Admits to fatigue for many months, thinks it started around 6 mo ago. States he had a CVA in Sept. Since then, he has been taking Eliquis BID. Admits to dyspnea and occasional palpitations. Denies chest pain, dizziness, lightheadedness, presyncope or syncope.   5/22/2024 Here for routine follow up after going for AF ablation 4/25. Now back in AF. Denies chest pain, dizziness, lightheadedness, presyncope or syncope.

## 2024-05-22 NOTE — ASSESSMENT
[FreeTextEntry1] : 68 yo M with history of NIDCM s/p BiV ICD (2/5/20), paroxysmal AFib on Eliquis who presents for routine cardiac follow up visit.   # NIDCM - BiV ICD with interrogation as listed above. Normal functioning device. Cath in 2021 with PCI.  - Remote monitor is set up and patient is transmitting.   - Elevated Optivol. Moved up appt with Dr. Wells. Pt states he is having dyspnea on exertion adn ICD shows elevated Optivol. Lungs are CTA.  - Cont GDMT   # Paroxysmal AFib with RVR s/p ablation 4/25/24 - Cont Eliquis 5 mg PO BID for CHADS VASc at least 7 (CHF, HTN, Age > 65, DM, CAD, CVA).  No signs/symptoms of bleeding.  - Cont Metoprolol Succ  mg BID  - Rec KUSH/CV and start Amio post CV  # Tobacco Use - Strongly advised tobacco /cigarette smoking cessation but he refused  # HTN - BP well controlled - Cont Metoprolol Succ  mg daily - 2g Na diet enforced  I have also advised the patient to go to the nearest emergency room if he experiences any chest pain, dyspnea, syncope, or has any other compelling symptoms.  Follow up in 1 mo

## 2024-05-22 NOTE — PHYSICAL EXAM
[Normal Appearance] : normal appearance [Well Groomed] : well groomed [No Deformities] : no deformities [General Appearance - In No Acute Distress] : no acute distress [Heart Sounds] : normal S1 and S2 [Murmurs] : no murmurs present [] : no respiratory distress [Respiration, Rhythm And Depth] : normal respiratory rhythm and effort [Exaggerated Use Of Accessory Muscles For Inspiration] : no accessory muscle use [Auscultation Breath Sounds / Voice Sounds] : lungs were clear to auscultation bilaterally [Left Infraclavicular] : left infraclavicular area [Well-Healed] : well-healed [Abdomen Soft] : soft [Nail Clubbing] : no clubbing of the fingernails [FreeTextEntry1] : groins well healed per pt

## 2024-05-28 ENCOUNTER — APPOINTMENT (OUTPATIENT)
Dept: CARDIOLOGY | Facility: CLINIC | Age: 69
End: 2024-05-28
Payer: MEDICARE

## 2024-05-28 VITALS
WEIGHT: 181 LBS | SYSTOLIC BLOOD PRESSURE: 110 MMHG | DIASTOLIC BLOOD PRESSURE: 80 MMHG | HEIGHT: 63 IN | BODY MASS INDEX: 32.07 KG/M2

## 2024-05-28 DIAGNOSIS — Z95.3 PRESENCE OF XENOGENIC HEART VALVE: ICD-10-CM

## 2024-05-28 DIAGNOSIS — E78.5 HYPERLIPIDEMIA, UNSPECIFIED: ICD-10-CM

## 2024-05-28 DIAGNOSIS — I50.9 HEART FAILURE, UNSPECIFIED: ICD-10-CM

## 2024-05-28 PROCEDURE — G2211 COMPLEX E/M VISIT ADD ON: CPT

## 2024-05-28 PROCEDURE — 99214 OFFICE O/P EST MOD 30 MIN: CPT

## 2024-05-28 RX ORDER — SACUBITRIL AND VALSARTAN 24; 26 MG/1; MG/1
24-26 TABLET, FILM COATED ORAL DAILY
Refills: 0 | Status: ACTIVE | COMMUNITY

## 2024-05-28 NOTE — REASON FOR VISIT
home [Cardiac Failure] : cardiac failure [Arrhythmia/ECG Abnorrmalities] : arrhythmia/ECG abnormalities [Structural Heart and Valve Disease] : structural heart and valve disease [Hyperlipidemia] : hyperlipidemia [Hypertension] : hypertension [Coronary Artery Disease] : coronary artery disease [FreeTextEntry3] : Martin Shaver

## 2024-05-28 NOTE — REVIEW OF SYSTEMS
[Feeling Fatigued] : feeling fatigued [Dyspnea on exertion] : dyspnea during exertion [Chest Discomfort] : no chest discomfort [Lower Ext Edema] : no extremity edema [Palpitations] : no palpitations [Orthopnea] : no orthopnea [PND] : no PND [Memory Lapses Or Loss] : memory lapses or loss [Negative] : Heme/Lymph

## 2024-05-28 NOTE — ASSESSMENT
[FreeTextEntry1] : 70 yo M with history of CAD s/p PCIs (to RCA and LAD), AS s/p bioprosthetic AVR, ICM (EF 35-40%), s/p BiV ICD in 2020, paroxysmal AFib, HTN, HL, prior syncope, COPD and recent hospitalization for TIA who presents for follow up.  # ICM (EF35-40%) s/p BiV ICD - NYHA 3, appears euvolemic - continue Entresto 24-26 BID, Metoprolol succinate 200 BID  - Given recent Optivol readings have shown increased fluid status we will have him increase his Lasix to 40 mg b.i.d. for 5 days and then resume once daily dosing with p.r.n. flex dosing of an additional tablet for weight gain, shortness of breath or swelling. I provided him with written instructions. -He would not like to add any additional medications for GDMT -Obtain CMP, CBC, lipid profile and BMP in 1 week -Repeat TTE  # CAD - stable - continue clopidogrel (additional indication TIA) - encouraged considering smoking cessation  # Paroxysmal Afib - continue Eliquis and Metoprolol  # bioprosthetic AVR (unknown type) - repeat TTE  Offered pulmonary referral for COPD, he declined.  RTC in 6 months

## 2024-05-28 NOTE — PHYSICAL EXAM
[Well Developed] : well developed [Well Nourished] : well nourished [No Acute Distress] : no acute distress [Normal Conjunctiva] : normal conjunctiva [Normal Venous Pressure] : normal venous pressure [No Carotid Bruit] : no carotid bruit [Normal S1, S2] : normal S1, S2 [No Rub] : no rub [No Gallop] : no gallop [Clear Lung Fields] : clear lung fields [Good Air Entry] : good air entry [No Respiratory Distress] : no respiratory distress  [Soft] : abdomen soft [Non Tender] : non-tender [Normal Bowel Sounds] : normal bowel sounds [Normal Gait] : normal gait [No Edema] : no edema [No Cyanosis] : no cyanosis [No Clubbing] : no clubbing [Moves all extremities] : moves all extremities [Normal Speech] : normal speech [Alert and Oriented] : alert and oriented [de-identified] : 1/6 early peaking systolic murmur at RUSB

## 2024-05-28 NOTE — HISTORY OF PRESENT ILLNESS
[FreeTextEntry1] : 70 yo M with history of CAD s/p PCIs (to RCA and LAD), AS s/p bioprosthetic AVR, ICM (EF 35-40%), s/p BiV ICD in 2020, paroxysmal AFib, HTN, HL, prior syncope, COPD and recent hospitalization for TIA who presents for follow up.  Since last visit he is generally stable. He underwent attempted A. fib ablation about a month ago but is now back and atrial fibrillation. He has chronic shortness of breath on exertion after about walking 6 steps, he states this is been stable for several years however recent Optivol readings have shown increased fluid status. He reports no chest pain, palpitations, lightheadedness, syncope, orthopnea, PND or lower extremity edema. He continues to smoke and is not interested in quitting.  TTE 9/4/23 in hospital reviewed  1. LV Ejection Fraction by Beck's Method with a biplane EF of 38 %.  2. Mild left ventricular hypertrophy.  3. Mildly enlarged left atrium.  4. There is no evidence of pericardial effusion.  5. Mild mitral annular calcification.  6. Bioprosthesis in the aortic position, appeared normal functioning.  Hospital d/c blood work reviewed. K 5 (hemolyzed), Cr 1.3, Mag 2.2

## 2024-05-31 ENCOUNTER — APPOINTMENT (OUTPATIENT)
Dept: CARDIOLOGY | Facility: CLINIC | Age: 69
End: 2024-05-31

## 2024-06-05 ENCOUNTER — RX RENEWAL (OUTPATIENT)
Age: 69
End: 2024-06-05

## 2024-06-26 ENCOUNTER — APPOINTMENT (OUTPATIENT)
Dept: ELECTROPHYSIOLOGY | Facility: CLINIC | Age: 69
End: 2024-06-26
Payer: MEDICARE

## 2024-06-26 VITALS
SYSTOLIC BLOOD PRESSURE: 124 MMHG | HEART RATE: 85 BPM | WEIGHT: 186 LBS | DIASTOLIC BLOOD PRESSURE: 86 MMHG | HEIGHT: 63 IN | BODY MASS INDEX: 32.96 KG/M2 | TEMPERATURE: 98 F

## 2024-06-26 DIAGNOSIS — Z45.02 ENCOUNTER FOR ADJUSTMENT AND MANAGEMENT OF AUTOMATIC IMPLANTABLE CARDIAC DEFIBRILLATOR: ICD-10-CM

## 2024-06-26 DIAGNOSIS — I48.0 PAROXYSMAL ATRIAL FIBRILLATION: ICD-10-CM

## 2024-06-26 DIAGNOSIS — I25.5 ISCHEMIC CARDIOMYOPATHY: ICD-10-CM

## 2024-06-26 DIAGNOSIS — I10 ESSENTIAL (PRIMARY) HYPERTENSION: ICD-10-CM

## 2024-06-26 PROCEDURE — 93000 ELECTROCARDIOGRAM COMPLETE: CPT | Mod: 59

## 2024-06-26 PROCEDURE — G2211 COMPLEX E/M VISIT ADD ON: CPT

## 2024-06-26 PROCEDURE — 93290 INTERROG DEV EVAL ICPMS IP: CPT | Mod: 26

## 2024-06-26 PROCEDURE — 93284 PRGRMG EVAL IMPLANTABLE DFB: CPT

## 2024-06-26 PROCEDURE — 99215 OFFICE O/P EST HI 40 MIN: CPT

## 2024-06-26 RX ORDER — AMIODARONE HYDROCHLORIDE 200 MG/1
200 TABLET ORAL TWICE DAILY
Qty: 90 | Refills: 3 | Status: DISCONTINUED | COMMUNITY
Start: 2024-05-22 | End: 2024-06-26

## 2024-08-21 ENCOUNTER — APPOINTMENT (OUTPATIENT)
Dept: ELECTROPHYSIOLOGY | Facility: CLINIC | Age: 69
End: 2024-08-21

## 2024-10-03 ENCOUNTER — NON-APPOINTMENT (OUTPATIENT)
Age: 69
End: 2024-10-03

## 2024-10-03 ENCOUNTER — APPOINTMENT (OUTPATIENT)
Dept: CARDIOLOGY | Facility: CLINIC | Age: 69
End: 2024-10-03
Payer: MEDICARE

## 2024-10-03 PROCEDURE — 93296 REM INTERROG EVL PM/IDS: CPT

## 2024-10-03 PROCEDURE — 93295 DEV INTERROG REMOTE 1/2/MLT: CPT

## 2024-10-14 ENCOUNTER — INPATIENT (INPATIENT)
Facility: HOSPITAL | Age: 69
LOS: 1 days | Discharge: HOME HEALTH | DRG: 310 | End: 2024-10-16
Attending: INTERNAL MEDICINE | Admitting: INTERNAL MEDICINE
Payer: MEDICARE

## 2024-10-14 VITALS
WEIGHT: 210.1 LBS | TEMPERATURE: 98 F | RESPIRATION RATE: 18 BRPM | OXYGEN SATURATION: 97 % | SYSTOLIC BLOOD PRESSURE: 101 MMHG | HEART RATE: 152 BPM | DIASTOLIC BLOOD PRESSURE: 79 MMHG | HEIGHT: 64 IN

## 2024-10-14 DIAGNOSIS — Z98.890 OTHER SPECIFIED POSTPROCEDURAL STATES: Chronic | ICD-10-CM

## 2024-10-14 DIAGNOSIS — I48.91 UNSPECIFIED ATRIAL FIBRILLATION: ICD-10-CM

## 2024-10-14 DIAGNOSIS — Z95.5 PRESENCE OF CORONARY ANGIOPLASTY IMPLANT AND GRAFT: Chronic | ICD-10-CM

## 2024-10-14 DIAGNOSIS — Z95.2 PRESENCE OF PROSTHETIC HEART VALVE: Chronic | ICD-10-CM

## 2024-10-14 LAB
ALBUMIN SERPL ELPH-MCNC: 3.7 G/DL — SIGNIFICANT CHANGE UP (ref 3.5–5.2)
ALP SERPL-CCNC: 113 U/L — SIGNIFICANT CHANGE UP (ref 30–115)
ALT FLD-CCNC: 17 U/L — SIGNIFICANT CHANGE UP (ref 0–41)
ANION GAP SERPL CALC-SCNC: 11 MMOL/L — SIGNIFICANT CHANGE UP (ref 7–14)
APTT BLD: 32 SEC — SIGNIFICANT CHANGE UP (ref 27–39.2)
AST SERPL-CCNC: 31 U/L — SIGNIFICANT CHANGE UP (ref 0–41)
BASOPHILS # BLD AUTO: 0.07 K/UL — SIGNIFICANT CHANGE UP (ref 0–0.2)
BASOPHILS NFR BLD AUTO: 0.7 % — SIGNIFICANT CHANGE UP (ref 0–1)
BILIRUB SERPL-MCNC: 0.6 MG/DL — SIGNIFICANT CHANGE UP (ref 0.2–1.2)
BUN SERPL-MCNC: 15 MG/DL — SIGNIFICANT CHANGE UP (ref 10–20)
CALCIUM SERPL-MCNC: 8.6 MG/DL — SIGNIFICANT CHANGE UP (ref 8.4–10.5)
CHLORIDE SERPL-SCNC: 107 MMOL/L — SIGNIFICANT CHANGE UP (ref 98–110)
CO2 SERPL-SCNC: 21 MMOL/L — SIGNIFICANT CHANGE UP (ref 17–32)
CREAT SERPL-MCNC: 1.2 MG/DL — SIGNIFICANT CHANGE UP (ref 0.7–1.5)
EGFR: 65 ML/MIN/1.73M2 — SIGNIFICANT CHANGE UP
EOSINOPHIL # BLD AUTO: 0.28 K/UL — SIGNIFICANT CHANGE UP (ref 0–0.7)
EOSINOPHIL NFR BLD AUTO: 2.8 % — SIGNIFICANT CHANGE UP (ref 0–8)
GLUCOSE BLDC GLUCOMTR-MCNC: 346 MG/DL — HIGH (ref 70–99)
GLUCOSE SERPL-MCNC: 304 MG/DL — HIGH (ref 70–99)
HCT VFR BLD CALC: 45.5 % — SIGNIFICANT CHANGE UP (ref 42–52)
HGB BLD-MCNC: 14.9 G/DL — SIGNIFICANT CHANGE UP (ref 14–18)
IMM GRANULOCYTES NFR BLD AUTO: 0.7 % — HIGH (ref 0.1–0.3)
INR BLD: 1.11 RATIO — SIGNIFICANT CHANGE UP (ref 0.65–1.3)
LYMPHOCYTES # BLD AUTO: 1.54 K/UL — SIGNIFICANT CHANGE UP (ref 1.2–3.4)
LYMPHOCYTES # BLD AUTO: 15.2 % — LOW (ref 20.5–51.1)
MCHC RBC-ENTMCNC: 29.9 PG — SIGNIFICANT CHANGE UP (ref 27–31)
MCHC RBC-ENTMCNC: 32.7 G/DL — SIGNIFICANT CHANGE UP (ref 32–37)
MCV RBC AUTO: 91.2 FL — SIGNIFICANT CHANGE UP (ref 80–94)
MONOCYTES # BLD AUTO: 0.71 K/UL — HIGH (ref 0.1–0.6)
MONOCYTES NFR BLD AUTO: 7 % — SIGNIFICANT CHANGE UP (ref 1.7–9.3)
NEUTROPHILS # BLD AUTO: 7.46 K/UL — HIGH (ref 1.4–6.5)
NEUTROPHILS NFR BLD AUTO: 73.6 % — SIGNIFICANT CHANGE UP (ref 42.2–75.2)
NRBC # BLD: 0 /100 WBCS — SIGNIFICANT CHANGE UP (ref 0–0)
PLATELET # BLD AUTO: 247 K/UL — SIGNIFICANT CHANGE UP (ref 130–400)
PMV BLD: 11.9 FL — HIGH (ref 7.4–10.4)
POTASSIUM SERPL-MCNC: 4.8 MMOL/L — SIGNIFICANT CHANGE UP (ref 3.5–5)
POTASSIUM SERPL-SCNC: 4.8 MMOL/L — SIGNIFICANT CHANGE UP (ref 3.5–5)
PROT SERPL-MCNC: 5.9 G/DL — LOW (ref 6–8)
PROTHROM AB SERPL-ACNC: 12.7 SEC — SIGNIFICANT CHANGE UP (ref 9.95–12.87)
RBC # BLD: 4.99 M/UL — SIGNIFICANT CHANGE UP (ref 4.7–6.1)
RBC # FLD: 14.8 % — HIGH (ref 11.5–14.5)
SODIUM SERPL-SCNC: 139 MMOL/L — SIGNIFICANT CHANGE UP (ref 135–146)
TROPONIN T, HIGH SENSITIVITY RESULT: 130 NG/L — CRITICAL HIGH (ref 6–21)
TROPONIN T, HIGH SENSITIVITY RESULT: 135 NG/L — CRITICAL HIGH (ref 6–21)
WBC # BLD: 10.13 K/UL — SIGNIFICANT CHANGE UP (ref 4.8–10.8)
WBC # FLD AUTO: 10.13 K/UL — SIGNIFICANT CHANGE UP (ref 4.8–10.8)

## 2024-10-14 PROCEDURE — 93005 ELECTROCARDIOGRAM TRACING: CPT

## 2024-10-14 PROCEDURE — 99291 CRITICAL CARE FIRST HOUR: CPT

## 2024-10-14 PROCEDURE — 80048 BASIC METABOLIC PNL TOTAL CA: CPT

## 2024-10-14 PROCEDURE — 84443 ASSAY THYROID STIM HORMONE: CPT

## 2024-10-14 PROCEDURE — 83735 ASSAY OF MAGNESIUM: CPT

## 2024-10-14 PROCEDURE — 93320 DOPPLER ECHO COMPLETE: CPT

## 2024-10-14 PROCEDURE — 84439 ASSAY OF FREE THYROXINE: CPT

## 2024-10-14 PROCEDURE — 85025 COMPLETE CBC W/AUTO DIFF WBC: CPT

## 2024-10-14 PROCEDURE — 36415 COLL VENOUS BLD VENIPUNCTURE: CPT

## 2024-10-14 PROCEDURE — 85027 COMPLETE CBC AUTOMATED: CPT

## 2024-10-14 PROCEDURE — 93325 DOPPLER ECHO COLOR FLOW MAPG: CPT

## 2024-10-14 PROCEDURE — 93010 ELECTROCARDIOGRAM REPORT: CPT

## 2024-10-14 PROCEDURE — 93284 PRGRMG EVAL IMPLANTABLE DFB: CPT | Mod: 26

## 2024-10-14 PROCEDURE — 84484 ASSAY OF TROPONIN QUANT: CPT

## 2024-10-14 PROCEDURE — 99223 1ST HOSP IP/OBS HIGH 75: CPT

## 2024-10-14 PROCEDURE — 83036 HEMOGLOBIN GLYCOSYLATED A1C: CPT

## 2024-10-14 PROCEDURE — 71045 X-RAY EXAM CHEST 1 VIEW: CPT | Mod: 26

## 2024-10-14 PROCEDURE — 93312 ECHO TRANSESOPHAGEAL: CPT

## 2024-10-14 PROCEDURE — 82962 GLUCOSE BLOOD TEST: CPT

## 2024-10-14 PROCEDURE — 80061 LIPID PANEL: CPT

## 2024-10-14 RX ORDER — DULAGLUTIDE 4.5 MG/.5ML
4.5 INJECTION, SOLUTION SUBCUTANEOUS
Refills: 0 | DISCHARGE

## 2024-10-14 RX ORDER — ATORVASTATIN CALCIUM 10 MG/1
40 TABLET, FILM COATED ORAL AT BEDTIME
Refills: 0 | Status: DISCONTINUED | OUTPATIENT
Start: 2024-10-14 | End: 2024-10-15

## 2024-10-14 RX ORDER — FENOFIBRATE NANOCRYSTALLIZED 145 MG
145 TABLET ORAL DAILY
Refills: 0 | Status: DISCONTINUED | OUTPATIENT
Start: 2024-10-14 | End: 2024-10-16

## 2024-10-14 RX ORDER — METOPROLOL TARTRATE 50 MG
200 TABLET ORAL DAILY
Refills: 0 | Status: DISCONTINUED | OUTPATIENT
Start: 2024-10-14 | End: 2024-10-16

## 2024-10-14 RX ORDER — ALCOHOL ANTISEPTIC PADS
15 PADS, MEDICATED (EA) TOPICAL ONCE
Refills: 0 | Status: DISCONTINUED | OUTPATIENT
Start: 2024-10-14 | End: 2024-10-16

## 2024-10-14 RX ORDER — SACUBITRIL AND VALSARTAN 97; 103 MG/1; MG/1
1 TABLET, FILM COATED ORAL
Refills: 0 | Status: DISCONTINUED | OUTPATIENT
Start: 2024-10-14 | End: 2024-10-16

## 2024-10-14 RX ORDER — INFLUENZA VIRUS VACCINE 15; 15; 15; 15 UG/.5ML; UG/.5ML; UG/.5ML; UG/.5ML
0.5 SUSPENSION INTRAMUSCULAR ONCE
Refills: 0 | Status: DISCONTINUED | OUTPATIENT
Start: 2024-10-14 | End: 2024-10-16

## 2024-10-14 RX ORDER — AMIODARONE HYDROCHLORIDE 50 MG/ML
0.5 INJECTION, SOLUTION INTRAVENOUS
Qty: 450 | Refills: 0 | Status: DISCONTINUED | OUTPATIENT
Start: 2024-10-14 | End: 2024-10-16

## 2024-10-14 RX ORDER — AMIODARONE HYDROCHLORIDE 50 MG/ML
1 INJECTION, SOLUTION INTRAVENOUS
Qty: 450 | Refills: 0 | Status: DISCONTINUED | OUTPATIENT
Start: 2024-10-14 | End: 2024-10-16

## 2024-10-14 RX ORDER — FUROSEMIDE 10 MG/ML
80 INJECTION INTRAVENOUS ONCE
Refills: 0 | Status: COMPLETED | OUTPATIENT
Start: 2024-10-14 | End: 2024-10-14

## 2024-10-14 RX ORDER — SODIUM CHLORIDE IRRIG SOLUTION 0.9 %
1000 SOLUTION, IRRIGATION IRRIGATION
Refills: 0 | Status: DISCONTINUED | OUTPATIENT
Start: 2024-10-14 | End: 2024-10-16

## 2024-10-14 RX ORDER — ALCOHOL ANTISEPTIC PADS
25 PADS, MEDICATED (EA) TOPICAL ONCE
Refills: 0 | Status: DISCONTINUED | OUTPATIENT
Start: 2024-10-14 | End: 2024-10-16

## 2024-10-14 RX ORDER — ASPIRIN 325 MG
325 TABLET ORAL ONCE
Refills: 0 | Status: COMPLETED | OUTPATIENT
Start: 2024-10-14 | End: 2024-10-14

## 2024-10-14 RX ORDER — METFORMIN HCL 500 MG
1 TABLET ORAL
Refills: 0 | DISCHARGE

## 2024-10-14 RX ORDER — APIXABAN 5 MG/1
5 TABLET, FILM COATED ORAL EVERY 12 HOURS
Refills: 0 | Status: DISCONTINUED | OUTPATIENT
Start: 2024-10-14 | End: 2024-10-16

## 2024-10-14 RX ORDER — DILTIAZEM HCL 300 MG
10 CAPSULE, EXTENDED RELEASE 24HR ORAL ONCE
Refills: 0 | Status: COMPLETED | OUTPATIENT
Start: 2024-10-14 | End: 2024-10-14

## 2024-10-14 RX ORDER — DILTIAZEM HCL 300 MG
5 CAPSULE, EXTENDED RELEASE 24HR ORAL
Qty: 125 | Refills: 0 | Status: DISCONTINUED | OUTPATIENT
Start: 2024-10-14 | End: 2024-10-14

## 2024-10-14 RX ORDER — ESZOPICLONE 3 MG/1
1 TABLET, FILM COATED ORAL
Refills: 0 | DISCHARGE

## 2024-10-14 RX ORDER — ALCOHOL ANTISEPTIC PADS
12.5 PADS, MEDICATED (EA) TOPICAL ONCE
Refills: 0 | Status: DISCONTINUED | OUTPATIENT
Start: 2024-10-14 | End: 2024-10-16

## 2024-10-14 RX ORDER — INSULIN LISPRO 100/ML
VIAL (ML) SUBCUTANEOUS
Refills: 0 | Status: DISCONTINUED | OUTPATIENT
Start: 2024-10-14 | End: 2024-10-16

## 2024-10-14 RX ORDER — AMIODARONE HYDROCHLORIDE 50 MG/ML
150 INJECTION, SOLUTION INTRAVENOUS ONCE
Refills: 0 | Status: COMPLETED | OUTPATIENT
Start: 2024-10-14 | End: 2024-10-14

## 2024-10-14 RX ORDER — GLUCAGON INJECTION, SOLUTION 0.5 MG/.1ML
1 INJECTION, SOLUTION SUBCUTANEOUS ONCE
Refills: 0 | Status: DISCONTINUED | OUTPATIENT
Start: 2024-10-14 | End: 2024-10-16

## 2024-10-14 RX ADMIN — SACUBITRIL AND VALSARTAN 1 TABLET(S): 97; 103 TABLET, FILM COATED ORAL at 18:35

## 2024-10-14 RX ADMIN — FUROSEMIDE 80 MILLIGRAM(S): 10 INJECTION INTRAVENOUS at 18:35

## 2024-10-14 RX ADMIN — AMIODARONE HYDROCHLORIDE 600 MILLIGRAM(S): 50 INJECTION, SOLUTION INTRAVENOUS at 17:16

## 2024-10-14 RX ADMIN — ATORVASTATIN CALCIUM 40 MILLIGRAM(S): 10 TABLET, FILM COATED ORAL at 21:34

## 2024-10-14 RX ADMIN — AMIODARONE HYDROCHLORIDE 33.3 MG/MIN: 50 INJECTION, SOLUTION INTRAVENOUS at 18:00

## 2024-10-14 RX ADMIN — Medication 4: at 21:34

## 2024-10-14 RX ADMIN — Medication 10 MILLIGRAM(S): at 12:50

## 2024-10-14 RX ADMIN — Medication 5 MG/HR: at 12:56

## 2024-10-14 RX ADMIN — Medication 200 MILLIGRAM(S): at 18:35

## 2024-10-14 RX ADMIN — APIXABAN 5 MILLIGRAM(S): 5 TABLET, FILM COATED ORAL at 17:16

## 2024-10-14 RX ADMIN — Medication 145 MILLIGRAM(S): at 18:35

## 2024-10-14 RX ADMIN — AMIODARONE HYDROCHLORIDE 16.7 MG/MIN: 50 INJECTION, SOLUTION INTRAVENOUS at 22:16

## 2024-10-14 RX ADMIN — Medication 325 MILLIGRAM(S): at 17:16

## 2024-10-14 NOTE — ED PROVIDER NOTE - PROGRESS NOTE DETAILS
Authored by Dr. Cason: Cardizem given slow responding.  Will place on the drip.  Electrophysiology aware. KEVIN Corbin MD:    Pt. responding to Cardizem.  Heart rate decreased from 160s to 140s.  EP at bedside evaluating patient. Authored by Dr. Cason: Notified about troponin.  Needs to be repeated.  Cardiology eval

## 2024-10-14 NOTE — CONSULT NOTE ADULT - NS ATTEND AMEND GEN_ALL_CORE FT
AF/RVR  Inappropriate ICD shocks    JOSEFA Snowden- rate control tonight  Baseline TSH, LFT, Echo- as indicated  Plan for KUSH/DCCV in am. DIscussed risks of UGTI, CVA. Agreeable  ICD programming changed to reduce risk of inappropriate ICD shocks tonight  Tele  d/w wife, cardio at bedside

## 2024-10-14 NOTE — H&P ADULT - NSHPLABSRESULTS_GEN_ALL_CORE
14.9   10.13 )-----------( 247      ( 14 Oct 2024 13:25 )             45.5     10-14    139  |  107  |  15  ----------------------------<  304[H]  4.8   |  21  |  1.2    Ca    8.6      14 Oct 2024 13:25    TPro  5.9[L]  /  Alb  3.7  /  TBili  0.6  /  DBili  x   /  AST  31  /  ALT  17  /  AlkPhos  113  10-14        PT/INR - ( 14 Oct 2024 13:25 )   PT: 12.70 sec;   INR: 1.11 ratio         PTT - ( 14 Oct 2024 13:25 )  PTT:32.0 sec                Urinalysis Basic - ( 14 Oct 2024 13:25 )    Color: x / Appearance: x / SG: x / pH: x  Gluc: 304 mg/dL / Ketone: x  / Bili: x / Urobili: x   Blood: x / Protein: x / Nitrite: x   Leuk Esterase: x / RBC: x / WBC x   Sq Epi: x / Non Sq Epi: x / Bacteria: x            Lactate Trend          CAPILLARY BLOOD GLUCOSE - Labs:                        14.9   10.13 )-----------( 247      ( 14 Oct 2024 13:25 )             45.5     10-14    139  |  107  |  15  ----------------------------<  304[H]  4.8   |  21  |  1.2    Ca    8.6      14 Oct 2024 13:25    TPro  5.9[L]  /  Alb  3.7  /  TBili  0.6  /  DBili  x   /  AST  31  /  ALT  17  /  AlkPhos  113  10-14    LIVER FUNCTIONS - ( 14 Oct 2024 13:25 )  Alb: 3.7 g/dL / Pro: 5.9 g/dL / ALK PHOS: 113 U/L / ALT: 17 U/L / AST: 31 U/L / GGT: x           PT/INR - ( 14 Oct 2024 13:25 )   PT: 12.70 sec;   INR: 1.11 ratio         PTT - ( 14 Oct 2024 13:25 )  PTT:32.0 sec          Lactate Trend    Urinalysis Basic - ( 14 Oct 2024 13:25 )    Color: x / Appearance: x / SG: x / pH: x  Gluc: 304 mg/dL / Ketone: x  / Bili: x / Urobili: x   Blood: x / Protein: x / Nitrite: x   Leuk Esterase: x / RBC: x / WBC x   Sq Epi: x / Non Sq Epi: x / Bacteria: x

## 2024-10-14 NOTE — ED ADULT NURSE NOTE - NSFALLUNIVINTERV_ED_ALL_ED
Bed/Stretcher in lowest position, wheels locked, appropriate side rails in place/Call bell, personal items and telephone in reach/Instruct patient to call for assistance before getting out of bed/chair/stretcher/Non-slip footwear applied when patient is off stretcher/[place] to call system/Physically safe environment - no spills, clutter or unnecessary equipment/Purposeful proactive rounding/Room/bathroom lighting operational, light cord in reach

## 2024-10-14 NOTE — ED PROVIDER NOTE - WR INTERPRETATION 1
ED CXR prelim, my independent interpretation - Dr. Oscar Cason: No PTX, No infiltrates, No Free air

## 2024-10-14 NOTE — ED ADULT NURSE REASSESSMENT NOTE - NS ED NURSE REASSESS COMMENT FT1
Patient assessed bedside.  A/O x 4 mentating appropriately.  No s/s of acute pain or distress. Cardiac monitoring maintained.  Pt admitted to cardiac telemetry awaiting bed placement.  Pt safety and comfort maintained.

## 2024-10-14 NOTE — PATIENT PROFILE ADULT - FALL HARM RISK - HARM RISK INTERVENTIONS

## 2024-10-14 NOTE — H&P ADULT - NSHPPHYSICALEXAM_GEN_ALL_CORE
LOS:     VITALS:   T(C): 36.8 (10-14-24 @ 17:00), Max: 36.8 (10-14-24 @ 17:00)  HR: 145 (10-14-24 @ 17:00) (124 - 157)  BP: 135/62 (10-14-24 @ 17:00) (101/79 - 150/95)  RR: 20 (10-14-24 @ 17:00) (18 - 20)  SpO2: 96% (10-14-24 @ 17:00) (93% - 97%)    GENERAL: NAD, lying in bed comfortably  HEAD:  Atraumatic, Normocephalic  EYES: EOMI, PERRLA, conjunctiva and sclera clear  ENT: Moist mucous membranes  NECK: Supple, No JVD  CHEST/LUNG: Clear to auscultation bilaterally; No rales, rhonchi, wheezing, or rubs. Unlabored respirations  HEART: Regular rate and rhythm; No murmurs, rubs, or gallops  ABDOMEN: BSx4; Soft, nontender, nondistended  EXTREMITIES:  2+ Peripheral Pulses, brisk capillary refill. No clubbing, cyanosis, or edema  NERVOUS SYSTEM:  A&Ox3, no focal deficits   SKIN: No rashes or lesions VITALS:   T(C): 36.8 (10-14-24 @ 17:00), Max: 36.8 (10-14-24 @ 17:00)  HR: 145 (10-14-24 @ 17:00) (124 - 157)  BP: 135/62 (10-14-24 @ 17:00) (101/79 - 150/95)  RR: 20 (10-14-24 @ 17:00) (18 - 20)  SpO2: 96% (10-14-24 @ 17:00) (93% - 97%)    GENERAL: NAD, lying in bed comfortably  HEAD:  Atraumatic, Normocephalic  EYES: EOMI, PERRLA, conjunctiva and sclera clear  ENT: Moist mucous membranes  NECK: Supple, No JVD  CHEST/LUNG: Clear to auscultation bilaterally; No rales, rhonchi, wheezing, or rubs. Unlabored respirations  HEART: Regular rate and rhythm; No murmurs, rubs, or gallops  ABDOMEN: BSx4; Soft, nontender, nondistended  EXTREMITIES:  2+ Peripheral Pulses, brisk capillary refill. No clubbing, cyanosis, or edema  NERVOUS SYSTEM:  A&Ox3, no focal deficits   SKIN: No rashes or lesions

## 2024-10-14 NOTE — H&P ADULT - TIME BILLING
Interviewed and examined patient. Reviewed hospital course, ECG, TTE (independently), tele, lab work, and medications. Discussed plan with patient. Coordinated care with EP.

## 2024-10-14 NOTE — H&P ADULT - ASSESSMENT
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    Plan     #Innopropriate ICD shock   - EKG in ed Afib with RVR  - Card gtt started in ed , stopped due to reduced EF  - Switched to Amio  GTT, patient has been compliant with Eliquis 5mg BID  - Npo after Mn   - Fu ekg in the am   - Fu a1c, lipid profile, cbc bmp  - FU tsh  - TTE once in NSR  - Continue Toprol 200mg daily     #HFrEF  - Cont Entresto 24/26mg bid  - Lasix 80mg IVP x1 (home dose lasix 40mg daily )  - TTE once in nsr     #HTN  - Continue Toprol 200mg daily     #HLD  - Cont Atorvastatin 40mg hs  - Cont Fenofibrate 145mg daily    #DM  - ISS  - monitor bg     Please contact [phone] with any questions or concerns.          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    Plan     #Innopropriate ICD shock   - EKG in ed Afib with RVR  - Card gtt started in ed , stopped due to reduced EF  - Switched to Amio  GTT, patient has been compliant with Eliquis 5mg BID  - Npo after Mn   - Fu ekg in the am   - Fu a1c, lipid profile, cbc bmp  - FU tsh  - TTE once in NSR  - Continue Toprol 200mg daily     #HFrEF  - Cont Entresto 24/26mg bid  - Lasix 80mg IVP x1 (home dose lasix 40mg daily )  - TTE once in nsr     #HTN  - Continue Toprol 200mg daily     #HLD  - Cont Atorvastatin 40mg hs  - Cont Fenofibrate 145mg daily    #DM  - ISS  - monitor bg     Please contact [phone] with any questions or concerns.

## 2024-10-14 NOTE — ED PROVIDER NOTE - OBJECTIVE STATEMENT
Bed: 17  Expected date: 7/4/20  Expected time: 6:55 PM  Means of arrival: [facility]  Comments:  *tom* R side chest pain worse w/ inspiration. 132/78, 130hr, 20rr, 98%RA, 98.5T   68-year-old male with PMH CAD s/p pacemaker and defibrillator, A-fib on Eliquis, DM, hypertension, hyperlipidemia, who presents to the ED after defibrillator went off 3 times, called in by electrophysiologist and told to come to the emergency department immediately.  He is also endorsing mild shortness of breath, chest pain.

## 2024-10-14 NOTE — ED ADULT NURSE NOTE - NSFALLRISKASMT_ED_ALL_ED_DT
14-Oct-2024 13:08 Pain Refusal Text: I offered to prescribe pain medication but the patient refused to take this medication.

## 2024-10-14 NOTE — ED PROVIDER NOTE - INTERPRETATION
ED EKG: my independent interpretation - Dr. Oscar Cason : A-fib at 157, right axis deviation no ST elevations QTc 500

## 2024-10-14 NOTE — H&P ADULT - NS ATTEND AMEND GEN_ALL_CORE FT
Agree with assessment and plan above, unless otherwise documented in my attestation.    Mr Frey is a 69yoM with PMhx of chronic HFrEF s/p ICD, AVR, HTN, pAF s/p ablation, CAD s/p PCI who presented after inappropriate shocks in the setting of AF with RVR.  -Start IV amio drip  -Continue eliquis  -Continue metoprolol  -Plan for KUSH/DCCV tomorrow  -TSH and LFTs  -ICD adjusted per EP  -Give 1 dose of IV lasix 80

## 2024-10-14 NOTE — ED PROVIDER NOTE - PHYSICAL EXAMINATION
CONSTITUTIONAL: Well-appearing; well-nourished; in no apparent distress.   HEAD: Normocephalic; atraumatic.   EYES: PERRL; EOM intact. Conjunctiva normal B/L.   CHEST: Normal chest excursion with respiration.   CARDIOVASCULAR: tachycardic, irregular   RESPIRATORY: Normal chest excursion with respiration; breath sounds clear and equal bilaterally; no wheezes, rhonchi, or rales.  GI/: Soft; non-distended; non-tender.  EXT: Normal ROM in all four extremities; non-tender to palpation; distal pulses are normal. No leg edema B/L.   SKIN: Normal for age and race; warm; dry; good turgor.  NEURO: A & O x 3

## 2024-10-14 NOTE — H&P ADULT - HISTORY OF PRESENT ILLNESS
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 Patient states he was laying down watching tv when he woke up and was shocked by his ICD 3x.  Patient denies any prodromal episodes, denies any syncope, cp, orthopnea, n/v/d/.  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 Patient states he was taking a nap when  he woke up and was shocked by his ICD 3x.  Patient endorses a pinching like chest pain for the past 2 days. Patient endorses SOB all the time but nothing new. Patient denies palpitations, diaphoresis, syncope.      In the ed  BP: 101/79  HR: 152  RR: 18  SpO2: 97%  EKG: A-fib RVR with PVC 157bpm   Troponin: 130

## 2024-10-14 NOTE — CONSULT NOTE ADULT - ASSESSMENT
70 yo male with hx HTN, IDDM, HLD, active smoker (+1PPD), CAD s/p PCI, A-Fib on Eliquis s/p ablation (4/2024), HFrEF (30%) s/p CRT-D, CVA presents to the ED due to being shocked by his ICD 3 times on Saturday (2days ago).    Impression:  Inappropriate ICD shocks  x3  Afib with RVR  Hx of CAD s/p PCI (pRCA and LAD)  HFrEF (30%) s/p CRT-D   CVA    THIS IS AN INCOMPLETE NOTE, PLEASE WAIT FOR ATTENDING FINALZATION    Plan:   - Patient inappropriately shocked for Afib with RVR  - Would stop the diltiazem drip as his EF is low  - Would start amiodarone drip for rate control  - Resume home dose of metoprolol if BP tolerates  - Correct electrolyte disturbances  - Keep NPO after midnight  - Keep K>4 and Mg >2   70 yo male with hx HTN, IDDM, HLD, active smoker (+1PPD), CAD s/p PCI, A-Fib on Eliquis s/p ablation (4/2024), HFrEF (30%) s/p CRT-D, CVA presents to the ED due to being shocked by his ICD 3 times on Saturday (2days ago).    Impression:  Inappropriate ICD shocks  x3  Afib with RVR  Hx of CAD s/p PCI (pRCA and LAD)  HFrEF (30%) s/p CRT-D   CVA    Plan:   - Patient inappropriately shocked for Afib with RVR  - Would stop the diltiazem drip as his EF is low  - Would start amiodarone drip for rate control  - Resume home dose of metoprolol 200 mg daily  - Please keep NPO after midnight for possible DCCV in AM if he is still in Afib  - CRT-D reprogrammed to increase VT rate zone to minimize risk of inappropriate shocks  - Correct electrolyte disturbances  - Keep NPO after midnight  - Keep K>4 and Mg >2

## 2024-10-14 NOTE — ED PROVIDER NOTE - ATTENDING CONTRIBUTION TO CARE
68-year-old male history of CAD, pacemaker, AICD A-fib on Eliquis diabetes hypertension hyperlipidemia now cold and by EP for defibrillator going off 3 times.  Currently no symptoms.    Exam as noted tachycardia awake and alert x 3 nontoxic lungs clear abdomen soft irregular rate on the monitor

## 2024-10-14 NOTE — ED PROVIDER NOTE - CLINICAL SUMMARY MEDICAL DECISION MAKING FREE TEXT BOX
Rapid A-fib.  Complicated by 3 shocks by defibrillator.  Seen by electrophysiology.  Chest x-ray negative.  Reassessed.  Started on drip.    My independent interpretation - Dr. Oscar Cason - of the following studies labs, imaging, ekg that showed: Rapid A-fib, no pneumothorax    Appropriate medications for patient's presenting complaints were ordered and effects were reassessed.   Patient's external records were reviewed.  Additional history obtained from patient's spouse.    Escalation to observation was considered.  At this time, patient requires inpatient hospitalization- monitored setting.

## 2024-10-15 ENCOUNTER — TRANSCRIPTION ENCOUNTER (OUTPATIENT)
Age: 69
End: 2024-10-15

## 2024-10-15 ENCOUNTER — RESULT REVIEW (OUTPATIENT)
Age: 69
End: 2024-10-15

## 2024-10-15 LAB
A1C WITH ESTIMATED AVERAGE GLUCOSE RESULT: 11.3 % — HIGH (ref 4–5.6)
ANION GAP SERPL CALC-SCNC: 11 MMOL/L — SIGNIFICANT CHANGE UP (ref 7–14)
BUN SERPL-MCNC: 13 MG/DL — SIGNIFICANT CHANGE UP (ref 10–20)
CALCIUM SERPL-MCNC: 10.4 MG/DL — SIGNIFICANT CHANGE UP (ref 8.4–10.5)
CHLORIDE SERPL-SCNC: 102 MMOL/L — SIGNIFICANT CHANGE UP (ref 98–110)
CHOLEST SERPL-MCNC: 182 MG/DL — SIGNIFICANT CHANGE UP
CO2 SERPL-SCNC: 27 MMOL/L — SIGNIFICANT CHANGE UP (ref 17–32)
CREAT SERPL-MCNC: 1.3 MG/DL — SIGNIFICANT CHANGE UP (ref 0.7–1.5)
EGFR: 59 ML/MIN/1.73M2 — LOW
ESTIMATED AVERAGE GLUCOSE: 278 MG/DL — HIGH (ref 68–114)
GLUCOSE BLDC GLUCOMTR-MCNC: 227 MG/DL — HIGH (ref 70–99)
GLUCOSE BLDC GLUCOMTR-MCNC: 251 MG/DL — HIGH (ref 70–99)
GLUCOSE BLDC GLUCOMTR-MCNC: 293 MG/DL — HIGH (ref 70–99)
GLUCOSE BLDC GLUCOMTR-MCNC: 80 MG/DL — SIGNIFICANT CHANGE UP (ref 70–99)
GLUCOSE SERPL-MCNC: 263 MG/DL — HIGH (ref 70–99)
HCT VFR BLD CALC: 50.7 % — SIGNIFICANT CHANGE UP (ref 42–52)
HDLC SERPL-MCNC: 39 MG/DL — LOW
HGB BLD-MCNC: 16.2 G/DL — SIGNIFICANT CHANGE UP (ref 14–18)
LIPID PNL WITH DIRECT LDL SERPL: 116 MG/DL — HIGH
MAGNESIUM SERPL-MCNC: 2 MG/DL — SIGNIFICANT CHANGE UP (ref 1.8–2.4)
MCHC RBC-ENTMCNC: 29.8 PG — SIGNIFICANT CHANGE UP (ref 27–31)
MCHC RBC-ENTMCNC: 32 G/DL — SIGNIFICANT CHANGE UP (ref 32–37)
MCV RBC AUTO: 93.4 FL — SIGNIFICANT CHANGE UP (ref 80–94)
NON HDL CHOLESTEROL: 143 MG/DL — HIGH
NRBC # BLD: 0 /100 WBCS — SIGNIFICANT CHANGE UP (ref 0–0)
PLATELET # BLD AUTO: 269 K/UL — SIGNIFICANT CHANGE UP (ref 130–400)
PMV BLD: 12.1 FL — HIGH (ref 7.4–10.4)
POTASSIUM SERPL-MCNC: 4.7 MMOL/L — SIGNIFICANT CHANGE UP (ref 3.5–5)
POTASSIUM SERPL-SCNC: 4.7 MMOL/L — SIGNIFICANT CHANGE UP (ref 3.5–5)
RBC # BLD: 5.43 M/UL — SIGNIFICANT CHANGE UP (ref 4.7–6.1)
RBC # FLD: 14.8 % — HIGH (ref 11.5–14.5)
SODIUM SERPL-SCNC: 140 MMOL/L — SIGNIFICANT CHANGE UP (ref 135–146)
T4 FREE SERPL-MCNC: 1.5 NG/DL — SIGNIFICANT CHANGE UP (ref 0.9–1.8)
TRIGL SERPL-MCNC: 137 MG/DL — SIGNIFICANT CHANGE UP
TROPONIN T, HIGH SENSITIVITY RESULT: 138 NG/L — CRITICAL HIGH (ref 6–21)
TSH SERPL-MCNC: 1.1 UIU/ML — SIGNIFICANT CHANGE UP (ref 0.27–4.2)
TSH SERPL-MCNC: 1.3 UIU/ML — SIGNIFICANT CHANGE UP (ref 0.27–4.2)
WBC # BLD: 10.61 K/UL — SIGNIFICANT CHANGE UP (ref 4.8–10.8)
WBC # FLD AUTO: 10.61 K/UL — SIGNIFICANT CHANGE UP (ref 4.8–10.8)

## 2024-10-15 PROCEDURE — 93010 ELECTROCARDIOGRAM REPORT: CPT

## 2024-10-15 PROCEDURE — 93325 DOPPLER ECHO COLOR FLOW MAPG: CPT | Mod: 26

## 2024-10-15 PROCEDURE — 92960 CARDIOVERSION ELECTRIC EXT: CPT

## 2024-10-15 PROCEDURE — 93320 DOPPLER ECHO COMPLETE: CPT | Mod: 26

## 2024-10-15 PROCEDURE — 99233 SBSQ HOSP IP/OBS HIGH 50: CPT | Mod: 25

## 2024-10-15 PROCEDURE — 93312 ECHO TRANSESOPHAGEAL: CPT | Mod: 26,XU

## 2024-10-15 RX ORDER — AMIODARONE HYDROCHLORIDE 50 MG/ML
400 INJECTION, SOLUTION INTRAVENOUS
Refills: 0 | Status: DISCONTINUED | OUTPATIENT
Start: 2024-10-15 | End: 2024-10-15

## 2024-10-15 RX ORDER — INSULIN LISPRO 100/ML
7 VIAL (ML) SUBCUTANEOUS
Refills: 0 | Status: DISCONTINUED | OUTPATIENT
Start: 2024-10-15 | End: 2024-10-16

## 2024-10-15 RX ORDER — ZOLPIDEM TARTRATE 5 MG
5 TABLET ORAL AT BEDTIME
Refills: 0 | Status: DISCONTINUED | OUTPATIENT
Start: 2024-10-15 | End: 2024-10-16

## 2024-10-15 RX ORDER — FUROSEMIDE 10 MG/ML
40 INJECTION INTRAVENOUS DAILY
Refills: 0 | Status: DISCONTINUED | OUTPATIENT
Start: 2024-10-15 | End: 2024-10-16

## 2024-10-15 RX ORDER — ATORVASTATIN CALCIUM 10 MG/1
80 TABLET, FILM COATED ORAL AT BEDTIME
Refills: 0 | Status: DISCONTINUED | OUTPATIENT
Start: 2024-10-15 | End: 2024-10-16

## 2024-10-15 RX ORDER — INSULIN GLARGINE 300 U/ML
20 INJECTION, SOLUTION SUBCUTANEOUS AT BEDTIME
Refills: 0 | Status: DISCONTINUED | OUTPATIENT
Start: 2024-10-15 | End: 2024-10-16

## 2024-10-15 RX ORDER — AMIODARONE HYDROCHLORIDE 50 MG/ML
400 INJECTION, SOLUTION INTRAVENOUS
Refills: 0 | Status: DISCONTINUED | OUTPATIENT
Start: 2024-10-15 | End: 2024-10-16

## 2024-10-15 RX ADMIN — Medication 5 MILLIGRAM(S): at 22:52

## 2024-10-15 RX ADMIN — Medication 3: at 17:27

## 2024-10-15 RX ADMIN — AMIODARONE HYDROCHLORIDE 400 MILLIGRAM(S): 50 INJECTION, SOLUTION INTRAVENOUS at 17:22

## 2024-10-15 RX ADMIN — INSULIN GLARGINE 20 UNIT(S): 300 INJECTION, SOLUTION SUBCUTANEOUS at 21:23

## 2024-10-15 RX ADMIN — Medication 200 MILLIGRAM(S): at 05:21

## 2024-10-15 RX ADMIN — APIXABAN 5 MILLIGRAM(S): 5 TABLET, FILM COATED ORAL at 17:22

## 2024-10-15 RX ADMIN — Medication 0: at 21:27

## 2024-10-15 RX ADMIN — Medication 3: at 07:56

## 2024-10-15 RX ADMIN — SACUBITRIL AND VALSARTAN 1 TABLET(S): 97; 103 TABLET, FILM COATED ORAL at 05:21

## 2024-10-15 RX ADMIN — FUROSEMIDE 40 MILLIGRAM(S): 10 INJECTION INTRAVENOUS at 09:45

## 2024-10-15 RX ADMIN — Medication 7 UNIT(S): at 17:28

## 2024-10-15 RX ADMIN — ATORVASTATIN CALCIUM 80 MILLIGRAM(S): 10 TABLET, FILM COATED ORAL at 21:23

## 2024-10-15 RX ADMIN — Medication 75 MILLIGRAM(S): at 11:02

## 2024-10-15 RX ADMIN — APIXABAN 5 MILLIGRAM(S): 5 TABLET, FILM COATED ORAL at 05:21

## 2024-10-15 RX ADMIN — SACUBITRIL AND VALSARTAN 1 TABLET(S): 97; 103 TABLET, FILM COATED ORAL at 17:22

## 2024-10-15 RX ADMIN — Medication 145 MILLIGRAM(S): at 11:02

## 2024-10-15 NOTE — DISCHARGE NOTE PROVIDER - CARE PROVIDERS DIRECT ADDRESSES
,DirectAddress_Unknown,[email or web address] ,[email or web address] [email or web address]

## 2024-10-15 NOTE — DISCHARGE NOTE PROVIDER - HOSPITAL COURSE
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  Patient states he was taking a nap when  he woke up and was shocked by his ICD 3x.  Patient endorses a pinching like chest pain for the past 2 days. Patient endorses SOB all the time but nothing new. Patient denies palpitations, diaphoresis, syncope.      In the ed  BP: 101/79  HR: 152  RR: 18  SpO2: 97%  EKG: A-fib RVR with PVC 157bpm   Troponin: 130   In the ED, patient was started on cardizem gtt at 5ml/hr which was discontinued due to patients low EF.    Patient was admitted to cardiac telemetry for afib rvr and further management.  EP was consulted for ICD shocks in which they recommended amio gtt, KUSH/DCCV, and EP team reprogrammed CRT-D to increase VT rate zone to minimize risk of inappropriate shocks.  On cards tele patient underwent IV diuresis with lasix 80mg IV daily due to volume overload.  Patient on 10/15/24 underwent KUSH/DCCV which successfully converted the patient to NSR.  KUSH revealed no RIGO thrombus, decreased LVEF, bioprosthetic AV with normal function.  Of note while patient was admitted risk stratification labs revealed: TSH 1.10, free T4 1.5, A1C 11.3.  Endocrinology was consulted due to poorly controlled diabetes mellitus.  Endocrine recommended ....     Patient hemodynamically stable and ready for DC home. 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  Patient states he was taking a nap when  he woke up and was shocked by his ICD 3x.  Patient endorses a pinching like chest pain for the past 2 days. Patient endorses SOB all the time but nothing new. Patient denies palpitations, diaphoresis, syncope.      In the ed  BP: 101/79  HR: 152  RR: 18  SpO2: 97%  EKG: A-fib RVR with PVC 157bpm   Troponin: 130   In the ED, patient was started on cardizem gtt at 5ml/hr which was discontinued due to patients low EF.    Patient was admitted to cardiac telemetry for afib rvr and further management.  EP was consulted for ICD shocks in which they recommended amio gtt, KUSH/DCCV, and EP team reprogrammed CRT-D to increase VT rate zone to minimize risk of inappropriate shocks.  On cards tele patient underwent IV diuresis with lasix 80mg IV daily due to volume overload.  Patient on 10/15/24 underwent KUSH/DCCV which successfully converted the patient to NSR.  KUSH revealed no RIGO thrombus, decreased LVEF, bioprosthetic AV with normal function.  Of note while patient was admitted risk stratification labs revealed: TSH 1.10, free T4 1.5, A1C 11.3.  Endocrinology was consulted due to poorly controlled diabetes mellitus.  Endocrine recommended increasing tresiba to 32 units daily, cont metformin 500mg twice daily, adding farxiga 10mg daily, and continueing home trulicity.     Patient hemodynamically stable and ready for DC home. 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  Patient states he was taking a nap when  he woke up and was shocked by his ICD 3x.  Patient endorses a pinching like chest pain for the past 2 days. Patient endorses SOB all the time but nothing new. Patient denies palpitations, diaphoresis, syncope.      In the ed  BP: 101/79  HR: 152  RR: 18  SpO2: 97%  EKG: A-fib RVR with PVC 157bpm   Troponin: 130     ICD interrogation demonstrated that he was shocked inappropriately for AF with RVR.    In the ED, patient was started on cardizem gtt at 5ml/hr which was discontinued due to patients low EF.    Patient was admitted to cardiac telemetry for afib rvr and further management.  EP was consulted for ICD shocks in which they recommended amio gtt, KUSH/DCCV, and EP team reprogrammed CRT-D to increase VT rate zone to minimize risk of inappropriate shocks.  On cards tele patient underwent IV diuresis with lasix 80mg IV daily due to volume overload.  Patient on 10/15/24 underwent KUSH/DCCV which successfully converted the patient to NSR.  KUSH revealed no RIGO thrombus, decreased LVEF, bioprosthetic AV with normal function.  Of note while patient was admitted risk stratification labs revealed: TSH 1.10, free T4 1.5, A1C 11.3.  Endocrinology was consulted due to poorly controlled diabetes mellitus.  Endocrine recommended increasing tresiba to 32 units daily, cont metformin 500mg twice daily, adding farxiga 10mg daily, and continueing home trulicity.     Patient hemodynamically stable and ready for DC home.

## 2024-10-15 NOTE — DISCHARGE NOTE PROVIDER - NSDCMRMEDTOKEN_GEN_ALL_CORE_FT
apixaban 5 mg oral tablet: 1 tab(s) orally every 12 hours  atorvastatin 40 mg oral tablet: 1 tab(s) orally once a day (at bedtime)  clopidogrel 75 mg oral tablet: 1 tab(s) orally once a day  Entresto 24 mg-26 mg oral tablet: 1 tab(s) orally 2 times a day  eszopiclone 3 mg oral tablet: 1 tab(s) orally once a day (at bedtime)  ezetimibe 10 mg oral tablet: 1 orally once a day  fenofibrate 145 mg oral tablet: 1 orally once a day  furosemide 40 mg oral tablet: 1 orally once a day  Lunesta 1 mg oral tablet: 1 orally once a day (at bedtime)  metFORMIN 500 mg oral tablet: 1 tab(s) orally 2 times a day  metoprolol succinate 200 mg oral capsule, extended release: 1 cap(s) orally once a day  Pacerone 200 mg oral tablet: 1 tab(s) orally once a day  Tresiba 100 units/mL subcutaneous solution: 28 unit(s) subcutaneous once a day (in the morning)  Trulicity Pen 4.5 mg/0.5 mL subcutaneous solution: 4.5 milligram(s) subcutaneously every 7 days   amiodarone 200 mg oral tablet: 2 tab(s) orally 2 times a day Please take 400mg twice daily, then 200mg thereafter.  apixaban 5 mg oral tablet: 1 tab(s) orally every 12 hours  atorvastatin 80 mg oral tablet: 1 tab(s) orally once a day (at bedtime)  clopidogrel 75 mg oral tablet: 1 tab(s) orally once a day  Entresto 24 mg-26 mg oral tablet: 1 tab(s) orally 2 times a day  eszopiclone 3 mg oral tablet: 1 tab(s) orally once a day (at bedtime)  ezetimibe 10 mg oral tablet: 1 orally once a day  Farxiga 10 mg oral tablet: 1 tab(s) orally once a day  fenofibrate 145 mg oral tablet: 1 orally once a day  furosemide 40 mg oral tablet: 1 orally once a day  Lunesta 1 mg oral tablet: 1 orally once a day (at bedtime)  metFORMIN 500 mg oral tablet: 1 tab(s) orally 2 times a day  metoprolol succinate 200 mg oral capsule, extended release: 1 cap(s) orally once a day  Tedulicity Pen 4.5 mg/0.5 mL subcutaneous solution: 4.5 milligram(s) subcutaneously every 7 days   amiodarone 200 mg oral tablet: 2 tab(s) orally 2 times a day Please take 400mg twice daily, then 200mg thereafter.  apixaban 5 mg oral tablet: 1 tab(s) orally every 12 hours  atorvastatin 80 mg oral tablet: 1 tab(s) orally once a day (at bedtime)  clopidogrel 75 mg oral tablet: 1 tab(s) orally once a day  Entresto 24 mg-26 mg oral tablet: 1 tab(s) orally 2 times a day  eszopiclone 3 mg oral tablet: 1 tab(s) orally once a day (at bedtime)  ezetimibe 10 mg oral tablet: 1 orally once a day  Farxiga 10 mg oral tablet: 1 tab(s) orally once a day  fenofibrate 145 mg oral tablet: 1 orally once a day  furosemide 40 mg oral tablet: 1 orally once a day  Lunesta 1 mg oral tablet: 1 orally once a day (at bedtime)  metFORMIN 500 mg oral tablet: 1 tab(s) orally 2 times a day  metoprolol succinate 200 mg oral capsule, extended release: 1 cap(s) orally once a day  Tresiba 100 units/mL subcutaneous solution: 32 unit(s) subcutaneous once a day (in the morning)  Trulicity Pen 4.5 mg/0.5 mL subcutaneous solution: 4.5 milligram(s) subcutaneously every 7 days

## 2024-10-15 NOTE — CONSULT NOTE ADULT - ATTENDING COMMENTS
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 Patient Unable to obtain a history, patient was unwilling to co operate or answer any questions regarding his diabetes. Mentioned that he was here only for care of his heart and did not want to talk about his diabetes.     #DM   - under poor control with a1c 11.3  - Currently only on sliding scale insulin   - home regimen from chart review appears that he is on tresiba 28 units trulicity 4.5 mg every week and metformin   - - For now would recommend starting with 20 units of Lantus and 7 units of lispro 3 times a day with current sliding scale, will likely need further increased dosing based on trend  -Discharge regimen to be determined

## 2024-10-15 NOTE — PROGRESS NOTE ADULT - TIME BILLING
Interviewed and examined patient. Reviewed hospital course, ECG, TTE (independently), tele, lab work, and medications. Discussed plan with patient.

## 2024-10-15 NOTE — PROGRESS NOTE ADULT - NS ATTEND AMEND GEN_ALL_CORE FT
Agree with assessment and plan above, unless otherwise documented in my attestation.    Mr Frey is a 69yoM with PMhx of chronic HFrEF s/p ICD, AVR, HTN, pAF s/p ablation, CAD s/p PCI who presented after inappropriate shocks in the setting of AF with RVR and mild acute decompensated HFrEF.  -Continue IV amio drip, transition to PO today  -Continue eliquis  -Continue metoprolol  -Plan for KUSH/DCCV today  -ICD adjusted per EP  -S/p 1 dose of IV lasix 80, now euvolemic

## 2024-10-15 NOTE — DISCHARGE NOTE PROVIDER - NSDCQMSTROKE_NEU_ALL_CORE
- Stress Test Note


Stress Test Results/Findings: 


Exam Performed: 


Exam Date: 


Reason for Exam: 





Height: 6 ft


Weight: 111.13 kg


Protocol: 


Stage: 


Duration of Exercise: 





Resting Heart Rate: 


Resting Blood Pressure: 


Maximum Achieved Heart Rate: 


Maximum Achieved Blood Pressure: 


85% PMHR: 


100% PMHR: 


METS: 


Technologist Comment: 





Stress Test Results/Findings: 


Patient was given Lexiscan injection over a period of for 15 seconds.  A sting 

EKG shows normal sinus rhythm with normal OR interval and QRS duration and 

normal ST-T waves.  No ST segment depression suggestive of ischemia is noted.  

Results of the nuclear study will follow. No

## 2024-10-15 NOTE — CHART NOTE - NSCHARTNOTEFT_GEN_A_CORE
Pre-procedure Assessment:    Procedure: KUSH with possible cardioversion   Indication: Recurrent a fib     Chart reviewed.    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 Patient states he was taking a nap when  he woke up and was shocked by his ICD 3x.  Patient endorses a pinching like chest pain for the past 2 days. Patient endorses SOB all the time but nothing new. Patient denies palpitations, diaphoresis, syncope.      He was started on amiodarone by EP. Here to KUSH and cardioversion   He's been getting Eliquis    No contraindication to proceed with KUSH and possible cardioversion. Pre-procedure Assessment:    Procedure: KUSH with possible cardioversion   Indication: Recurrent a fib     Chart reviewed.    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 Patient states he was taking a nap when  he woke up and was shocked by his ICD 3x.  Patient endorses a pinching like chest pain for the past 2 days. Patient endorses SOB all the time but nothing new. Patient denies palpitations, diaphoresis, syncope.      He was started on amiodarone by EP. Here for KUSH and cardioversion   He's been getting Eliquis    No contraindication to proceed with KUSH and possible cardioversion.    *** ATTENDING ATTESTATION ***    I saw and evaluated the patient at bedside.  Mr. Frey is appropriate for KUSH and DCCV for symptomatic rapid atrial fibrillation.  Indications, risks, and benefits were discussed with the patient.  Risk of major complications including esophageal perforation and death was cited at <1/5000.  Risk of minor complications including oropharyngeal injury/discomfort and minor bleeding was cited at 10%.  Patient elects to proceed with KUSH.

## 2024-10-15 NOTE — PROGRESS NOTE ADULT - SUBJECTIVE AND OBJECTIVE BOX
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 Patient states he was taking a nap when  he woke up and was shocked by his ICD 3x.  Patient endorses a pinching like chest pain for the past 2 days. Patient endorses SOB all the time but nothing new. Patient denies palpitations, diaphoresis, syncope.      In the ed  BP: 101/79  HR: 152  RR: 18  SpO2: 97%  EKG: A-fib RVR with PVC 157bpm   Troponin: 130     Interval history:     Review of systems: A complete 10-point review of systems was obtained and is negative except as stated in the interval history.    Vitals:  T(F): 98.2, Max: 98.2 (10-14 @ 17:00)  HR: 92 (92 - 157)  BP: 155/84 (96/64 - 155/84)  RR: 20 (18 - 20)  SpO2: 97% (93% - 98%)    Ins & outs:     10-14 @ 07:01  -  10-15 @ 07:00  --------------------------------------------------------  IN: 0 mL / OUT: 1250 mL / NET: -1250 mL      Weight trend:  Weight (kg): 95.3 (10-14)    Physical exam:  General: No apparent distress  HEENT: Anicteric sclera. Moist mucous membranes. JVP *** cm.   Cardiac: Regular rate and rhythm. No murmurs, rubs, or gallops.   Vascular: Symmetric radial pulses. Dorsalis pedis pulses palpable.   Respiratory: Normal effort. Bibasilar crackles. Clear to ascultation.   Abdomen: Soft, nontender. Audible bowel sounds.   Extremities: Warm with *** edema. No cyanosis or clubbing.   Skin: Warm and dry. No rash.   Neurologic: Grossly normal motor function.   Psychiatric: Oriented to person, place, and time.     Data reviewed:  - Telemetry:   - ECG (date***):   - TTE (date***):   - Chest x-ray (date***):   - Stress test:   - CCTA:  - Cardiac catheterization:  - Cardiac MRI:    - Labs:                        16.2   10.61 )-----------( 269      ( 15 Oct 2024 05:00 )             50.7     10-15    140  |  102  |  13  ----------------------------<  263[H]  4.7   |  27  |  1.3    Ca    10.4      15 Oct 2024 05:00  Mg     2.0     10-15    TPro  5.9[L]  /  Alb  3.7  /  TBili  0.6  /  DBili  x   /  AST  31  /  ALT  17  /  AlkPhos  113  10-14    PT/INR - ( 14 Oct 2024 13:25 )   PT: 12.70 sec;   INR: 1.11 ratio         PTT - ( 14 Oct 2024 13:25 )  PTT:32.0 sec        Triglycerides, Serum: 137 mg/dL (10-15-24 @ 05:00)  LDL Cholesterol Calculated: 116 mg/dL (10-15-24 @ 05:00)      Urinalysis Basic - ( 15 Oct 2024 05:00 )    Color: x / Appearance: x / SG: x / pH: x  Gluc: 263 mg/dL / Ketone: x  / Bili: x / Urobili: x   Blood: x / Protein: x / Nitrite: x   Leuk Esterase: x / RBC: x / WBC x   Sq Epi: x / Non Sq Epi: x / Bacteria: x        Medications:  apixaban 5 milliGRAM(s) Oral every 12 hours  atorvastatin 40 milliGRAM(s) Oral at bedtime  clopidogrel Tablet 75 milliGRAM(s) Oral daily  dextrose 50% Injectable 25 Gram(s) IV Push once  dextrose 50% Injectable 25 Gram(s) IV Push once  dextrose 50% Injectable 12.5 Gram(s) IV Push once  fenofibrate Tablet 145 milliGRAM(s) Oral daily  glucagon  Injectable 1 milliGRAM(s) IntraMuscular once  influenza  Vaccine (HIGH DOSE) 0.5 milliLiter(s) IntraMuscular once  insulin lispro (ADMELOG) corrective regimen sliding scale   SubCutaneous Before meals and at bedtime  metoprolol succinate  milliGRAM(s) Oral daily  sacubitril 24 mG/valsartan 26 mG 1 Tablet(s) Oral two times a day    Drips:  aMIOdarone Infusion 0.5 mG/Min (16.7 mL/Hr) IV Continuous <Continuous>  aMIOdarone Infusion 1 mG/Min (33.3 mL/Hr) IV Continuous <Continuous>  dextrose 5%. 1000 milliLiter(s) (100 mL/Hr) IV Continuous <Continuous>  dextrose 5%. 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 Patient states he was taking a nap when  he woke up and was shocked by his ICD 3x.  Patient endorses a pinching like chest pain for the past 2 days. Patient endorses SOB all the time but nothing new. Patient denies palpitations, diaphoresis, syncope.      In the ed  BP: 101/79  HR: 152  RR: 18  SpO2: 97%  EKG: A-fib RVR with PVC 157bpm   Troponin: 130     Interval history: Patient seen and examined bedside. NAD. No overnight events. Patient endorses pinching feeling across the chest. Denies palpitations, dizziness, chest pain. Patient underwent cardioversion this afternoon.    Review of systems: A complete 10-point review of systems was obtained and is negative except as stated in the interval history.    Vitals:  ICU Vital Signs Last 24 Hrs  T(C): 36.5 (15 Oct 2024 07:24), Max: 36.8 (14 Oct 2024 17:00)  T(F): 97.7 (15 Oct 2024 07:24), Max: 98.2 (14 Oct 2024 17:00)  HR: 94 (15 Oct 2024 07:24) (92 - 145)  BP: 110/63 (15 Oct 2024 07:24) (96/64 - 155/84)  BP(mean): 81 (15 Oct 2024 07:24) (75 - 112)  RR: 18 (15 Oct 2024 07:24) (18 - 20)  SpO2: 95% (15 Oct 2024 07:24) (95% - 98%)    O2 Parameters below as of 15 Oct 2024 04:36  Patient On (Oxygen Delivery Method): room air    Weight trend:  Weight (kg): 95.3 (10-14)    Physical exam:  General: No apparent distress  HEENT: Anicteric sclera. Moist mucous membranes. JVP *** cm.   Cardiac: Regular rate and rhythm. No murmurs, rubs, or gallops.   Vascular: Symmetric radial pulses. Dorsalis pedis pulses palpable.   Respiratory: Normal effort. Bibasilar crackles. Clear to ascultation.   Abdomen: Soft, nontender. Audible bowel sounds.   Extremities: Warm with no pedal edema. No cyanosis or clubbing.   Skin: Warm and dry. No rash.   Neurologic: Grossly normal motor function.   Psychiatric: Oriented to person, place, and time.     Data reviewed:  - Telemetry:   - ECG (10/14/2024): Afib w/ RVR, rate 157 and PVC's   - TTE (9/2023): LVEF 39%  - Chest x-ray (date***): Low lung volume. No radiographic evidence of acute cardiopulmonary disease. Left-sided permanent pacemaker.  - Cardiac catheterization (6/2021): Hemodynamically significant mid LAD lesion AUC score 7. Patent stents in prox LAD and RCA    - Labs:                        16.2   10.61 )-----------( 269      ( 15 Oct 2024 05:00 )             50.7     10-15    140  |  102  |  13  ----------------------------<  263[H]  4.7   |  27  |  1.3    Ca    10.4      15 Oct 2024 05:00  Mg     2.0     10-15    TPro  5.9[L]  /  Alb  3.7  /  TBili  0.6  /  DBili  x   /  AST  31  /  ALT  17  /  AlkPhos  113  10-14    PT/INR - ( 14 Oct 2024 13:25 )   PT: 12.70 sec;   INR: 1.11 ratio         PTT - ( 14 Oct 2024 13:25 )  PTT:32.0 sec    Triglycerides, Serum: 137 mg/dL (10-15-24 @ 05:00)  LDL Cholesterol Calculated: 116 mg/dL (10-15-24 @ 05:00)    Urinalysis Basic - ( 15 Oct 2024 05:00 )    Color: x / Appearance: x / SG: x / pH: x  Gluc: 263 mg/dL / Ketone: x  / Bili: x / Urobili: x   Blood: x / Protein: x / Nitrite: x   Leuk Esterase: x / RBC: x / WBC x   Sq Epi: x / Non Sq Epi: x / Bacteria: x        Medications:  apixaban 5 milliGRAM(s) Oral every 12 hours  atorvastatin 40 milliGRAM(s) Oral at bedtime  clopidogrel Tablet 75 milliGRAM(s) Oral daily  dextrose 50% Injectable 25 Gram(s) IV Push once  dextrose 50% Injectable 25 Gram(s) IV Push once  dextrose 50% Injectable 12.5 Gram(s) IV Push once  fenofibrate Tablet 145 milliGRAM(s) Oral daily  glucagon  Injectable 1 milliGRAM(s) IntraMuscular once  influenza  Vaccine (HIGH DOSE) 0.5 milliLiter(s) IntraMuscular once  insulin lispro (ADMELOG) corrective regimen sliding scale   SubCutaneous Before meals and at bedtime  metoprolol succinate  milliGRAM(s) Oral daily  sacubitril 24 mG/valsartan 26 mG 1 Tablet(s) Oral two times a day    Drips:  aMIOdarone Infusion 0.5 mG/Min (16.7 mL/Hr) IV Continuous <Continuous>  aMIOdarone Infusion 1 mG/Min (33.3 mL/Hr) IV Continuous <Continuous>  dextrose 5%. 1000 milliLiter(s) (100 mL/Hr) IV Continuous <Continuous>  dextrose 5%. 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 Patient states he was taking a nap when  he woke up and was shocked by his ICD 3x.  Patient endorses a pinching like chest pain for the past 2 days. Patient endorses SOB all the time but nothing new. Patient denies palpitations, diaphoresis, syncope.      In the ed  BP: 101/79  HR: 152  RR: 18  SpO2: 97%  EKG: A-fib RVR with PVC 157bpm   Troponin: 130     Interval history: Patient seen and examined bedside. NAD. No overnight events. Patient endorses pinching feeling across the chest. Denies palpitations, dizziness, chest pain. Patient underwent cardioversion this afternoon.    Review of systems: A complete 10-point review of systems was obtained and is negative except as stated in the interval history.    Vitals:  ICU Vital Signs Last 24 Hrs  T(C): 36.5 (15 Oct 2024 12:00), Max: 36.8 (14 Oct 2024 17:00)  T(F): 97.7 (15 Oct 2024 07:24), Max: 98.2 (14 Oct 2024 17:00)  HR: 94 (15 Oct 2024 12:00) (92 - 145)  BP: 110/63 (15 Oct 2024 12:00) (96/64 - 155/84)  BP(mean): 81 (15 Oct 2024 12:00) (75 - 112)  ABP: --  ABP(mean): --  RR: 18 (15 Oct 2024 12:00) (18 - 20)  SpO2: 95% (15 Oct 2024 12:00) (95% - 98%)    O2 Parameters below as of 15 Oct 2024 12:00    O2 Flow (L/min): 3    Weight trend:  Weight (kg): 95.3 (10-14)    Physical exam:  General: No apparent distress  HEENT: Anicteric sclera. Moist mucous membranes.   Cardiac: Regular rate and rhythm. No murmurs, rubs, or gallops.   Vascular: Symmetric radial pulses. Dorsalis pedis pulses palpable.   Respiratory: Normal effort. Bibasilar crackles. Clear to ascultation.   Abdomen: Soft, nontender. Audible bowel sounds.   Extremities: Warm with no pedal edema. No cyanosis or clubbing.   Skin: Warm and dry. No rash.   Neurologic: Grossly normal motor function.   Psychiatric: Oriented to person, place, and time.     Data reviewed:  - Telemetry: AV paced 94-105bpm   - ECG:  (10/15/24): V-paced 92bpm   (10/14/2024): Afib w/ RVR, rate 157 and PVC's   - TTE (9/2023): LVEF 39%  2. Mild left ventricular hypertrophy.   3. Mildly enlarged left atrium.   4. There is no evidence of pericardial effusion.   5. Mild mitral annular calcification.   6. Bioprosthesis in the aortic position.  - Chest x-ray 10/14/24: Low lung volume. No radiographic evidence of acute cardiopulmonary disease. Left-sided permanent pacemaker.  - Cardiac catheterization (6/2021): Hemodynamically significant mid LAD lesion AUC score 7. Patent stents in prox LAD and RCA    - Labs:                        16.2   10.61 )-----------( 269      ( 15 Oct 2024 05:00 )             50.7     10-15    140  |  102  |  13  ----------------------------<  263[H]  4.7   |  27  |  1.3    Ca    10.4      15 Oct 2024 05:00  Mg     2.0     10-15    TPro  5.9[L]  /  Alb  3.7  /  TBili  0.6  /  DBili  x   /  AST  31  /  ALT  17  /  AlkPhos  113  10-14    LIVER FUNCTIONS - ( 14 Oct 2024 13:25 )  Alb: 3.7 g/dL / Pro: 5.9 g/dL / ALK PHOS: 113 U/L / ALT: 17 U/L / AST: 31 U/L / GGT: x           PT/INR - ( 14 Oct 2024 13:25 )   PT: 12.70 sec;   INR: 1.11 ratio         PTT - ( 14 Oct 2024 13:25 )  PTT:32.0 sec        Lactate Trend    Urinalysis Basic - ( 15 Oct 2024 05:00 )    Color: x / Appearance: x / SG: x / pH: x  Gluc: 263 mg/dL / Ketone: x  / Bili: x / Urobili: x   Blood: x / Protein: x / Nitrite: x   Leuk Esterase: x / RBC: x / WBC x   Sq Epi: x / Non Sq Epi: x / Bacteria: x      Medications:  MEDICATIONS  (STANDING):  aMIOdarone    Tablet 400 milliGRAM(s) Oral two times a day  aMIOdarone Infusion 0.5 mG/Min (16.7 mL/Hr) IV Continuous <Continuous>  aMIOdarone Infusion 1 mG/Min (33.3 mL/Hr) IV Continuous <Continuous>  apixaban 5 milliGRAM(s) Oral every 12 hours  atorvastatin 80 milliGRAM(s) Oral at bedtime  clopidogrel Tablet 75 milliGRAM(s) Oral daily  dextrose 5%. 1000 milliLiter(s) (100 mL/Hr) IV Continuous <Continuous>  dextrose 5%. 1000 milliLiter(s) (50 mL/Hr) IV Continuous <Continuous>  dextrose 50% Injectable 25 Gram(s) IV Push once  dextrose 50% Injectable 25 Gram(s) IV Push once  dextrose 50% Injectable 12.5 Gram(s) IV Push once  fenofibrate Tablet 145 milliGRAM(s) Oral daily  furosemide    Tablet 40 milliGRAM(s) Oral daily  glucagon  Injectable 1 milliGRAM(s) IntraMuscular once  influenza  Vaccine (HIGH DOSE) 0.5 milliLiter(s) IntraMuscular once  insulin lispro (ADMELOG) corrective regimen sliding scale   SubCutaneous Before meals and at bedtime  metoprolol succinate  milliGRAM(s) Oral daily  sacubitril 24 mG/valsartan 26 mG 1 Tablet(s) Oral two times a day    MEDICATIONS  (PRN):  dextrose Oral Gel 15 Gram(s) Oral once PRN Blood Glucose LESS THAN 70 milliGRAM(s)/deciliter      PRN:     Allergies    Sulfac 10% (Unknown)    Intolerances

## 2024-10-15 NOTE — PROGRESS NOTE ADULT - ASSESSMENT
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    Plan     #Innopropriate ICD shock   - EKG in ed Afib with RVR  - Card gtt started in ed , stopped due to reduced EF  - Switched to Amio  GTT, patient has been compliant with Eliquis 5mg BID  - Npo after Mn   - Fu ekg in the am   - Fu a1c, lipid profile, cbc bmp  - FU tsh  - TTE once in NSR  - Continue Toprol 200mg daily     #HFrEF  - Cont Entresto 24/26mg bid  - Lasix 80mg IVP x1 (home dose lasix 40mg daily )  - TTE once in nsr     #HTN  - Continue Toprol 200mg daily     #HLD  - Cont Atorvastatin 40mg hs  - Cont Fenofibrate 145mg daily    #DM  - ISS  - monitor bg     Please contact [phone] with any questions or concerns.    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    Plan     #Innopropriate ICD shock   - EKG in ed Afib with RVR  - Card gtt started in ed , stopped due to reduced EF  - Switched to Amio gtt. Will switch to 400mg PO after infusion  - patient has been compliant with Eliquis 5mg BID  - Patient underwent cardioversion this afternoon   - Fu ekg in the am   - Total cholesterol 182, . Will increase statin to 80mg  - Fu a1c,   - FU tsh  - TTE once in NSR  - Continue Toprol 200mg daily     #HFrEF  - Cont Entresto 24/26mg bid  - Lasix 80mg IVP x1 (home dose lasix 40mg daily )  - TTE once in nsr     #HTN  - Continue Toprol 200mg daily     #HLD  - Increase atorvastatin to 80mg  - Cont Fenofibrate 145mg daily    #DM  - ISS  - monitor bg     Please contact [phone] with any questions or concerns.    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    Plan   #Innopropriate ICD shock   - EKG in ed Afib with RVR  - Card gtt started in ed, stopped due to reduced EF  - Switched to Amio gtt. Will switch to 400mg PO after infusion for 10 days then 200mg PO daily.   - patient has been compliant with Eliquis 5mg BID  - Patient underwent cardioversion this afternoon   - Fu ekg in the am   - Total cholesterol 182, . Will increase statin to 80mg  - Fu a1c,   - FU tsh  - TTE once in NSR  - Continue Toprol 200mg daily     #HFrEF  - Cont Entresto 24/26mg bid  - Lasix 80mg IVP x1 (home dose lasix 40mg daily )  - TTE once in nsr     #HTN  - Continue Toprol 200mg daily     #HLD  - Increase atorvastatin to 80mg  - Cont Fenofibrate 145mg daily    #DM  - ISS  - monitor bg     Please contact [phone] with any questions or concerns.

## 2024-10-15 NOTE — DISCHARGE NOTE PROVIDER - ATTENDING DISCHARGE PHYSICAL EXAMINATION:
Physical Exam  T(C): 36.9 (10-16-24 @ 07:24), Max: 36.9 (10-16-24 @ 07:15)  HR: 76 (10-16-24 @ 07:24) (67 - 76)  BP: 138/68 (10-16-24 @ 07:15) (112/74 - 158/67)  RR: 18 (10-16-24 @ 07:24) (17 - 20)  SpO2: 97% (10-16-24 @ 07:24) (88% - 99%)  Gen: NAD  CV: RRR, nl S1 and S2, no m/r/g, no LE edema, no JVD  Pulm: CTAB, no crackles  GI: soft, nontender  MSK: normal ROM  Extremities: warm  Neuro: A+Ox3  Psych: cooperative

## 2024-10-15 NOTE — DISCHARGE NOTE PROVIDER - NSDCFUSCHEDAPPT_GEN_ALL_CORE_FT
Cecilia Carrera  [facility]  ELECTROPH [address]  Scheduled Appointment: 10/23/2024    Bala Tovar  [facility]  ONCNEUROLO [address]  Scheduled Appointment: 11/19/2024    Kristin Card  [facility]  CARDIOLOGY [address]  Scheduled Appointment: 11/27/2024

## 2024-10-15 NOTE — CONSULT NOTE ADULT - ASSESSMENT
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 S/p successful KUSH/dCVV on 10/15/24. Endocrinology consulted for uncontrolled T2DM w/ hyperglycemia. Patient unwilling to participate in interview and therefore limited recommendations as a result    A1C: 11.3 %  BUN: 13  Creatinine: 1.3  GFR: 59  Weight: 95.3  BMI: 36    #T2DM w/ hyperglycemia  - A1c 11.3%  - On Tresiba 28 units in the AM, Metformin 500 mg BID & Trulicity 4.5 mg q1 week per chart review  - Patient unwilling to participate in interview and therefore limited recommendations as a result  Plan:  - Start Lantus 20 units qhs  - Start Lispro 7 units TIDAC  - Continue lispro low dose sliding scale before meals and at bedtime.  - Patient's fingerstick glucose goal is 140-180 mg/dL.    - Discharge recommendations to be discussed, will attempt to interview patient tomorrow to see if he is more cooperative   - Patient can follow up at discharge with Dr. Mckenzie [address], [place], [address], Phone: [phone]    Case discussed with Dr. Mckenzie. Primary team updated.

## 2024-10-15 NOTE — CONSULT NOTE ADULT - SUBJECTIVE AND OBJECTIVE BOX
Cardiology Fellow Notes  68 yo male with hx HTN, IDDM, HLD, active smoker (+1PPD), CAD s/p PCI, A-Fib on Eliquis s/p ablation (4/2024), HFrEF (30%) s/p CRTD, CVA presents to the ED due to being shocked by his ICD 3 times on Saturday (2days ago). He reports that he was lying down watching TV and woke up after being shocked by his ICD, and then his ICD fired twice more while he was fully awake. Patient does not report prodromal episodes. He has some SOB, but not different from his baseline. No chest pain prior to episode, no dizziness. No other symptoms.        PAST MEDICAL & SURGICAL HISTORY  CHF (congestive heart failure)    Diabetes    CAD (coronary artery disease)    Chronic obstructive pulmonary disease (COPD)    HTN (hypertension)    Stented coronary artery    Dyslipidemia    GERD (gastroesophageal reflux disease)    Afib    CVA (cerebral vascular accident)    H/O heart artery stent    Heart valve replaced  aortic    History of Nissen fundoplication        FAMILY HISTORY:  FAMILY HISTORY:  NC    SOCIAL HISTORY:  Social History:  Smoker    ALLERGIES:  Sulfac 10% (Unknown)      MEDICATIONS:  diltiazem Infusion 5 mG/Hr (5 mL/Hr) IV Continuous <Continuous>    PRN:      HOME MEDICATIONS:  Home Medications:  apixaban 5 mg oral tablet: 1 tab(s) orally every 12 hours (25 Apr 2024 07:30)  atorvastatin 40 mg oral tablet: 1 tab(s) orally once a day (at bedtime) (25 Apr 2024 07:30)  clopidogrel 75 mg oral tablet: 1 tab(s) orally once a day (25 Apr 2024 07:30)  Entresto 24 mg-26 mg oral tablet: 1 tab(s) orally 2 times a day (25 Apr 2024 07:30)  ezetimibe 10 mg oral tablet: 1 orally once a day (25 Apr 2024 07:30)  fenofibrate 145 mg oral tablet: 1 orally once a day (25 Apr 2024 07:30)  furosemide 40 mg oral tablet: 1 orally once a day (25 Apr 2024 07:30)  Lunesta 1 mg oral tablet: 1 orally once a day (at bedtime) (25 Apr 2024 07:30)  metoprolol succinate 200 mg oral capsule, extended release: 1 cap(s) orally once a day (25 Apr 2024 07:30)  Tresiba 100 units/mL subcutaneous solution: 28 unit(s) subcutaneous once a day (in the morning) (25 Apr 2024 07:30)      VITALS:   T(F): 97.8 (10-14 @ 11:47), Max: 97.8 (10-14 @ 11:47)  HR: 124 (10-14 @ 13:02) (124 - 157)  BP: 123/82 (10-14 @ 13:02) (101/79 - 150/95)  BP(mean): --  RR: 18 (10-14 @ 11:50) (18 - 18)  SpO2: 97% (10-14 @ 12:00) (93% - 97%)    I&O's Summary      REVIEW OF SYSTEMS:  CONSTITUTIONAL: No weakness, fevers or chills  HEENT: No visual changes, neck/ear pain  RESPIRATORY: Mild baseline SOB  CARDIOVASCULAR: See HPI    PHYSICAL EXAM:  *General: Not in distress.  Non-toxic appearing.   *HEENT: EOMI  *Cardio: regular, S1, S2, no murmur  *Pulm: Mild crackles  *Abdomen: Soft, non-tender, non-distended. Normoactive bowel sounds  *Extremities: LE edema +1  *Neuro: A&O x3. No focal deficits    LABS:                    Troponin trend:            IMAGING:  - CXR:    - TTE:    - CT:     - CCTA:    - STRESS TEST:    - CATHETERIZATION:    - MRI:     - Ultrasound:     ECG:  Afib with RVR    TELEMETRY EVENTS:  Afib with RVR  PVCs
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 Patient states he was taking a nap when  he woke up and was shocked by his ICD 3x.  Patient endorses a pinching like chest pain for the past 2 days. Patient endorses SOB all the time but nothing new. Patient denies palpitations, diaphoresis, syncope.      In the ED,  BP: 101/79  HR: 152  RR: 18  SpO2: 97%  EKG: A-fib RVR with PVC 157bpm   Troponin: 130     S/p KUSH/dCVV on 10/15/24    Endocrinology has been consulted for Diabetes Management. Patient uncooperative and unwilling to participate in discussion regarding Diabetes. Demanded for this writer and attending to exit the room repeatedly.     DIABETES HISTORY  - Age at diagnosis: Unknown  - Diabetes managed outpatient by: Unknown  - Current Therapy: Unknown  - History of other regimens: Metformin 500 mg BID, Tresiba 28 unit in the AM & Trulicity 4.5 mg q1 week  - History of hypoglycemia: Unknown   - History of DKA/HHS: Unknown  - Diabetic Complications: Unknown  - Home glucose readings: Unknown      ALLERGIES  Sulfac 10% (Unknown)      CURRENT MEDICATIONS  aMIOdarone    Tablet 400 milliGRAM(s) Oral two times a day  aMIOdarone Infusion 0.5 mG/Min IV Continuous <Continuous>  aMIOdarone Infusion 1 mG/Min IV Continuous <Continuous>  apixaban 5 milliGRAM(s) Oral every 12 hours  atorvastatin 80 milliGRAM(s) Oral at bedtime  clopidogrel Tablet 75 milliGRAM(s) Oral daily  dextrose 5%. 1000 milliLiter(s) IV Continuous <Continuous>  dextrose 5%. 1000 milliLiter(s) IV Continuous <Continuous>  dextrose 50% Injectable 25 Gram(s) IV Push once  dextrose 50% Injectable 12.5 Gram(s) IV Push once  dextrose 50% Injectable 25 Gram(s) IV Push once  dextrose Oral Gel 15 Gram(s) Oral once PRN  fenofibrate Tablet 145 milliGRAM(s) Oral daily  furosemide    Tablet 40 milliGRAM(s) Oral daily  glucagon  Injectable 1 milliGRAM(s) IntraMuscular once  influenza  Vaccine (HIGH DOSE) 0.5 milliLiter(s) IntraMuscular once  insulin lispro (ADMELOG) corrective regimen sliding scale   SubCutaneous Before meals and at bedtime  metoprolol succinate  milliGRAM(s) Oral daily  sacubitril 24 mG/valsartan 26 mG 1 Tablet(s) Oral two times a day      REVIEW OF SYSTEMS  Constitutional:  Negative fever, chills   Cardiovascular:  Negative for chest pain or palpitations.  Respiratory:  Negative for cough, wheezing, or shortness of breath.   Gastrointestinal:  Negative for nausea, vomiting, diarrhea, constipation, or abdominal pain.  Genitourinary:  Negative frequency, urgency or dysuria.  Neurologic:  No headache, confusion, dizziness    PHYSICAL EXAM  Vital Signs Last 24 Hrs  T(C): 36.3 (15 Oct 2024 14:36), Max: 36.8 (14 Oct 2024 17:00)  T(F): 97.4 (15 Oct 2024 14:36), Max: 98.2 (14 Oct 2024 17:00)  HR: 71 (15 Oct 2024 14:36) (71 - 145)  BP: 112/74 (15 Oct 2024 14:36) (96/64 - 155/84)  BP(mean): 88 (15 Oct 2024 14:36) (75 - 112)  RR: 20 (15 Oct 2024 14:36) (18 - 20)  SpO2: 99% (15 Oct 2024 14:36) (95% - 99%)    Parameters below as of 15 Oct 2024 14:36  Patient On (Oxygen Delivery Method): nasal cannula  O2 Flow (L/min): 2    Constitutional: Awake, alert  Unable to examine as patient uncooperative    LABS  CBC - WBC/HGB/HTC/PLT: 10.61/16.2/50.7/269 (10-15-24)  BMP: Na/K/Cl/Bicarb/BUN/Cr/Gluc: 140/4.7/102/27/13/1.3/263 (10-15-24)  Anion Gap: 11 (10-15-24)  eGFR: 59 (10-15-24)  Calcium: 10.4 (10-15-24)  Phosphorus: -- (10-15-24)  Magnesium: 2.0 (10-15-24)  LFT - Alb/Tprot/Tbili/Dbili/AlkPhos/ALT/AST: 3.7/--/0.6/--/113/17/31 (10-14-24)  PT/aPTT/INR: 12.70/32.0/1.11 (10-14-24)  Thyroid Stimulating Hormone, Serum: 1.10 (10-15-24)    CBC - WBC/HGB/HTC/PLT: 10.61/16.2/50.7/269 (10-15-24)BMP: Na/K/Cl/Bicarb/BUN/Cr/Gluc: 140/4.7/102/27/13/1.3/263 (10-15-24)  Anion Gap: 11 (10-15-24)  eGFR: 59 (10-15-24)  Calcium: 10.4 (10-15-24)  Phosphorus: -- (10-15-24)  Magnesium: 2.0 (10-15-24)    CAPILLARY BLOOD GLUCOSE & INSULIN RECEIVED  346 mg/dL (10-14 @ 21:28)  293 mg/dL (10-15 @ 07:26)  227 mg/dL (10-15 @ 10:57)        10-14-24 @ 07:01  -  10-15-24 @ 07:00  --------------------------------------------------------  IN: 0 mL / OUT: 1250 mL / NET: -1250 mL    10-15-24 @ 07:01  -  10-15-24 @ 15:57  --------------------------------------------------------  IN: 0 mL / OUT: 450 mL / NET: -450 mL

## 2024-10-15 NOTE — DISCHARGE NOTE PROVIDER - CARE PROVIDER_API CALL
Kristin Card  Cardiovascular Disease  [address] [place] [address]  Phone: [phone]  Fax: [phone]  Follow Up Time: 2 weeks    Domenic Mckenzie  Endocrinology/Metab/Diabetes  [address] [place] [address]  Phone: [phone]  Fax: [phone]  Follow Up Time: 2 weeks   Domenic Mckenzie  Endocrinology/Metab/Diabetes  [address] [place] [address]  Phone: [phone]  Fax: [phone]  Follow Up Time: 2 weeks    Dutch Day  Cardiovascular Disease  [address] [facility] [address]  [place] [address]  Phone: [phone]  Fax: [phone]  Follow Up Time: 2 weeks    Cecilia Carrera  Cardiovascular Disease  [address] [place] [address]  Phone: [phone]  Fax: [phone]  Follow Up Time: 1 month

## 2024-10-15 NOTE — CHART NOTE - NSCHARTNOTEFT_GEN_A_CORE
INDICATION:  - A fib     IMPRESSION:  - No RIGO thrombus.   - Decreased LVEF   - Bioprosthetic AV with normal function     PROCEDURE: Transesophageal echocardiogram    Primary Physician: Dr. Pratt  Assistant: Dr. Alcantara    ANESTHESIA TYPE:   - As documented by anesthesia     CONDITION:  [  ] Good    ESTIMATED BLOOD LOSS: None    COMPLICATIONS: None    FINDINGS:    After risks and benefits of procedures were explained, informed consent was obtained and placed in chart. Refer to Anesthesia note for sedation details.  The KUSH probe was passed into the esophagus without difficulty.  Transesophageal images were obtained.  The KUSH probe was removed without difficulty and examined.  There was no evidence for bleeding.  The patient tolerated the procedure well without any immediate KUSH-related complications.      Preliminary Findings:  LA: Enlarged. + spontaneous echo contrast.   RIGO: Left atrial appendage was clear of clot and spontaneous echo contrast.  LV: LVEF was estimated at 35%.   MV: Mild MR  AV: Bioprosthetic well seated and expanded with normal function. No PVL. No evidence of AI.    RA: Enlarged.   RV: Decreased function.   Wires / Catheters: Seem in SVC, RA and RV with fibrinous strands.   TV: mild TR.   PV: NWV.   IAS: Small ASD with left to right shunt.   Pericardial Effusion: None.   There was a left sided pleural effusion.   Aorta: Size: . There was mild simple atheroma seen in the thoracic aorta.     Patient successfully converted to sinus rhythm with synchronized  200J of direct current cardioversion.    Full report to follow INDICATION:  - A fib     IMPRESSION:  - No RIGO thrombus.   - Decreased LVEF   - Bioprosthetic AV with normal function   - s/p DCCV with resultant sinus rhythm    PROCEDURE: Transesophageal echocardiogram    Primary Physician: Dr. Pratt  Assistant: Dr. Alcantara    ANESTHESIA TYPE:   - As documented by anesthesia     CONDITION:  [  ] Good    ESTIMATED BLOOD LOSS: None    COMPLICATIONS: None    FINDINGS:    After risks and benefits of procedures were explained, informed consent was obtained and placed in chart. Refer to Anesthesia note for sedation details.  The KUSH probe was passed into the esophagus without difficulty.  Transesophageal images were obtained.  The KUSH probe was removed without difficulty and examined.  There was no evidence for bleeding.  The patient tolerated the procedure well without any immediate KUSH-related complications.      Preliminary Findings:  LA: Enlarged. + spontaneous echo contrast.   RIGO: Left atrial appendage was clear of clot and spontaneous echo contrast.  LV: LVEF was estimated at 35%.   MV: Mild MR  AV: Bioprosthetic well seated and expanded with normal function. No PVL. No evidence of AI.    RA: Enlarged.   RV: Decreased function.   Wires / Catheters: Seem in SVC, RA and RV with fibrinous strands.   TV: mild TR.   PV: NWV.   IAS: Small ASD with left to right shunt.   Pericardial Effusion: None.   There was a left sided pleural effusion.   Aorta: Size: . There was mild simple atheroma seen in the thoracic aorta.     Patient successfully converted to sinus rhythm with synchronized  200J of direct current cardioversion.    Full report to follow

## 2024-10-15 NOTE — DISCHARGE NOTE PROVIDER - NSDCCPTREATMENT_GEN_ALL_CORE_FT
PRINCIPAL PROCEDURE  Procedure: Cardioversion, with KUSH if indicated  Findings and Treatment:      PRINCIPAL PROCEDURE  Procedure: Cardioversion, with KUSH if indicated  Findings and Treatment: You were successfully cardiverted back to Normal sinus rhythm, please continue to take your eliquis twice daily.  Please continue to take Amiodarone 400mg twice daily x 2 weeks then 200mg daily thereafter.   - Your Tresiba was sent to your [facility] pharmacy 32 units daily  - Start taking Farxiga 10mg daily.  - Continue taking your Metformin 500mg twice daily .  Followup with Dr. Mckenzie endocrinology  Followup with Dr. Carrera electrophysiology  Followup with Dr. Day cardiology

## 2024-10-16 ENCOUNTER — TRANSCRIPTION ENCOUNTER (OUTPATIENT)
Age: 69
End: 2024-10-16

## 2024-10-16 VITALS — OXYGEN SATURATION: 97 % | TEMPERATURE: 98 F | RESPIRATION RATE: 18 BRPM | HEART RATE: 76 BPM

## 2024-10-16 LAB
ANION GAP SERPL CALC-SCNC: 20 MMOL/L — HIGH (ref 7–14)
BASOPHILS # BLD AUTO: 0.08 K/UL — SIGNIFICANT CHANGE UP (ref 0–0.2)
BASOPHILS NFR BLD AUTO: 0.5 % — SIGNIFICANT CHANGE UP (ref 0–1)
BUN SERPL-MCNC: 18 MG/DL — SIGNIFICANT CHANGE UP (ref 10–20)
CALCIUM SERPL-MCNC: 9.8 MG/DL — SIGNIFICANT CHANGE UP (ref 8.4–10.5)
CHLORIDE SERPL-SCNC: 103 MMOL/L — SIGNIFICANT CHANGE UP (ref 98–110)
CO2 SERPL-SCNC: 17 MMOL/L — SIGNIFICANT CHANGE UP (ref 17–32)
CREAT SERPL-MCNC: 1.3 MG/DL — SIGNIFICANT CHANGE UP (ref 0.7–1.5)
EGFR: 59 ML/MIN/1.73M2 — LOW
EOSINOPHIL # BLD AUTO: 0.24 K/UL — SIGNIFICANT CHANGE UP (ref 0–0.7)
EOSINOPHIL NFR BLD AUTO: 1.4 % — SIGNIFICANT CHANGE UP (ref 0–8)
GLUCOSE BLDC GLUCOMTR-MCNC: 138 MG/DL — HIGH (ref 70–99)
GLUCOSE BLDC GLUCOMTR-MCNC: 241 MG/DL — HIGH (ref 70–99)
GLUCOSE SERPL-MCNC: 144 MG/DL — HIGH (ref 70–99)
HCT VFR BLD CALC: 49.3 % — SIGNIFICANT CHANGE UP (ref 42–52)
HGB BLD-MCNC: 16.2 G/DL — SIGNIFICANT CHANGE UP (ref 14–18)
IMM GRANULOCYTES NFR BLD AUTO: 0.8 % — HIGH (ref 0.1–0.3)
LYMPHOCYTES # BLD AUTO: 1.44 K/UL — SIGNIFICANT CHANGE UP (ref 1.2–3.4)
LYMPHOCYTES # BLD AUTO: 8.5 % — LOW (ref 20.5–51.1)
MAGNESIUM SERPL-MCNC: 1.8 MG/DL — SIGNIFICANT CHANGE UP (ref 1.8–2.4)
MCHC RBC-ENTMCNC: 29.9 PG — SIGNIFICANT CHANGE UP (ref 27–31)
MCHC RBC-ENTMCNC: 32.9 G/DL — SIGNIFICANT CHANGE UP (ref 32–37)
MCV RBC AUTO: 91 FL — SIGNIFICANT CHANGE UP (ref 80–94)
MONOCYTES # BLD AUTO: 0.97 K/UL — HIGH (ref 0.1–0.6)
MONOCYTES NFR BLD AUTO: 5.7 % — SIGNIFICANT CHANGE UP (ref 1.7–9.3)
NEUTROPHILS # BLD AUTO: 14.04 K/UL — HIGH (ref 1.4–6.5)
NEUTROPHILS NFR BLD AUTO: 83.1 % — HIGH (ref 42.2–75.2)
NRBC # BLD: 0 /100 WBCS — SIGNIFICANT CHANGE UP (ref 0–0)
PLATELET # BLD AUTO: 260 K/UL — SIGNIFICANT CHANGE UP (ref 130–400)
PMV BLD: 12.3 FL — HIGH (ref 7.4–10.4)
POTASSIUM SERPL-MCNC: 5.1 MMOL/L — HIGH (ref 3.5–5)
POTASSIUM SERPL-SCNC: 5.1 MMOL/L — HIGH (ref 3.5–5)
RBC # BLD: 5.42 M/UL — SIGNIFICANT CHANGE UP (ref 4.7–6.1)
RBC # FLD: 14.6 % — HIGH (ref 11.5–14.5)
SODIUM SERPL-SCNC: 140 MMOL/L — SIGNIFICANT CHANGE UP (ref 135–146)
WBC # BLD: 16.9 K/UL — HIGH (ref 4.8–10.8)
WBC # FLD AUTO: 16.9 K/UL — HIGH (ref 4.8–10.8)

## 2024-10-16 PROCEDURE — 99233 SBSQ HOSP IP/OBS HIGH 50: CPT

## 2024-10-16 PROCEDURE — 93010 ELECTROCARDIOGRAM REPORT: CPT

## 2024-10-16 RX ORDER — ATORVASTATIN CALCIUM 10 MG/1
1 TABLET, FILM COATED ORAL
Qty: 0 | Refills: 0 | DISCHARGE
Start: 2024-10-16

## 2024-10-16 RX ORDER — AMIODARONE HYDROCHLORIDE 200 MG/1
1 TABLET ORAL
Refills: 0 | DISCHARGE
Start: 2024-10-16 | End: 2024-11-14

## 2024-10-16 RX ORDER — DAPAGLIFLOZIN 10 MG/1
1 TABLET, FILM COATED ORAL
Qty: 30 | Refills: 0
Start: 2024-10-16 | End: 2024-11-14

## 2024-10-16 RX ORDER — AMIODARONE HYDROCHLORIDE 50 MG/ML
2 INJECTION, SOLUTION INTRAVENOUS
Qty: 120 | Refills: 0
Start: 2024-10-16 | End: 2024-11-14

## 2024-10-16 RX ADMIN — Medication 145 MILLIGRAM(S): at 11:11

## 2024-10-16 RX ADMIN — Medication 2: at 11:11

## 2024-10-16 RX ADMIN — AMIODARONE HYDROCHLORIDE 400 MILLIGRAM(S): 50 INJECTION, SOLUTION INTRAVENOUS at 05:23

## 2024-10-16 RX ADMIN — SACUBITRIL AND VALSARTAN 1 TABLET(S): 97; 103 TABLET, FILM COATED ORAL at 05:22

## 2024-10-16 RX ADMIN — FUROSEMIDE 40 MILLIGRAM(S): 10 INJECTION INTRAVENOUS at 05:22

## 2024-10-16 RX ADMIN — APIXABAN 5 MILLIGRAM(S): 5 TABLET, FILM COATED ORAL at 05:23

## 2024-10-16 RX ADMIN — Medication 200 MILLIGRAM(S): at 05:22

## 2024-10-16 RX ADMIN — Medication 7 UNIT(S): at 11:12

## 2024-10-16 RX ADMIN — Medication 75 MILLIGRAM(S): at 11:11

## 2024-10-16 NOTE — CHART NOTE - NSCHARTNOTEFT_GEN_A_CORE
Spoke w/ patient at bedside, who remains unwilling to speak to endocrine however did participate more this AM  States he takes Tresiba 28 units in the AM in addition to metformin 500 mg BID & Trulicity 4.5 mg q1 week  States he has been compliant w/ his meds, however there "is no point as his diabetes will never be fixed"    #Uncontrolled T2DM    Discharge recommendations: continue w/ home metformin 500 mg BID and Trulicity 4.5 mg q1 week; increase Tresiba to 32 units in the AM and add Farxiga 10 mg q24H for cardio-protective benefits   Can follow-up w/ Dr. Mckenzie at [address], [place], [address], Phone: [phone] or at the [facility] clinic if agreeable

## 2024-10-16 NOTE — DISCHARGE NOTE NURSING/CASE MANAGEMENT/SOCIAL WORK - NSDCPEFALRISK_GEN_ALL_CORE
For information on Fall & Injury Prevention, visit: [email or web address] OR  [email or web address] OR  https://www.cdc.gov/steadi/patient.html

## 2024-10-16 NOTE — DISCHARGE NOTE NURSING/CASE MANAGEMENT/SOCIAL WORK - FINANCIAL ASSISTANCE
[facility] provides services at a reduced cost to those who are determined to be eligible through [facility]’s financial assistance program. Information regarding [facility]’s financial assistance program can be found by going to [email or web address] or by calling [phone].

## 2024-10-16 NOTE — DISCHARGE NOTE NURSING/CASE MANAGEMENT/SOCIAL WORK - PATIENT PORTAL LINK FT
You can access the FollowMyHealth Patient Portal offered by [facility] by registering at the following website: [email or web address]. By joining [employer]’s FollowMyHealth portal, you will also be able to view your health information using other applications (apps) compatible with our system.

## 2024-10-17 RX ORDER — DULAGLUTIDE 4.5 MG/.5ML
4.5 INJECTION, SOLUTION SUBCUTANEOUS
Qty: 1 | Refills: 0
Start: 2024-10-17 | End: 2024-11-15

## 2024-10-17 RX ORDER — INSULIN DEGLUDEC 100 U/ML
32 INJECTION, SOLUTION SUBCUTANEOUS
Qty: 1 | Refills: 0
Start: 2024-10-17 | End: 2024-11-15

## 2024-10-17 RX ORDER — INSULIN DEGLUDEC 100 U/ML
34 INJECTION, SOLUTION SUBCUTANEOUS
Qty: 1 | Refills: 0 | DISCHARGE
Start: 2024-10-17 | End: 2024-11-15

## 2024-10-17 RX ORDER — DULAGLUTIDE 4.5 MG/.5ML
4.5 INJECTION, SOLUTION SUBCUTANEOUS
Refills: 0 | DISCHARGE

## 2024-10-18 ENCOUNTER — EMERGENCY (EMERGENCY)
Facility: HOSPITAL | Age: 69
LOS: 0 days | Discharge: LEFT BEFORE DISCHARGE | End: 2024-10-18
Attending: EMERGENCY MEDICINE
Payer: MEDICARE

## 2024-10-18 VITALS
DIASTOLIC BLOOD PRESSURE: 77 MMHG | RESPIRATION RATE: 18 BRPM | HEART RATE: 77 BPM | SYSTOLIC BLOOD PRESSURE: 121 MMHG | OXYGEN SATURATION: 98 % | TEMPERATURE: 97 F | HEIGHT: 64 IN
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
## 2024-10-18 DIAGNOSIS — I45.10 UNSPECIFIED RIGHT BUNDLE-BRANCH BLOCK: ICD-10-CM
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
## 2024-10-18 DIAGNOSIS — Z95.5 PRESENCE OF CORONARY ANGIOPLASTY IMPLANT AND GRAFT: Chronic | ICD-10-CM

## 2024-10-18 DIAGNOSIS — Z53.29 PROCEDURE AND TREATMENT NOT CARRIED OUT BECAUSE OF PATIENT'S DECISION FOR OTHER REASONS: ICD-10-CM

## 2024-10-18 DIAGNOSIS — Z98.890 OTHER SPECIFIED POSTPROCEDURAL STATES: Chronic | ICD-10-CM

## 2024-10-18 DIAGNOSIS — Z79.01 LONG TERM (CURRENT) USE OF ANTICOAGULANTS: ICD-10-CM

## 2024-10-18 DIAGNOSIS — Z88.2 ALLERGY STATUS TO SULFONAMIDES: ICD-10-CM

## 2024-10-18 DIAGNOSIS — Z95.2 PRESENCE OF PROSTHETIC HEART VALVE: Chronic | ICD-10-CM

## 2024-10-18 PROCEDURE — 93005 ELECTROCARDIOGRAM TRACING: CPT

## 2024-10-18 PROCEDURE — 99283 EMERGENCY DEPT VISIT LOW MDM: CPT | Mod: 25

## 2024-10-18 PROCEDURE — 93010 ELECTROCARDIOGRAM REPORT: CPT

## 2024-10-18 PROCEDURE — 99284 EMERGENCY DEPT VISIT MOD MDM: CPT

## 2024-10-18 NOTE — ED PROVIDER NOTE - PATIENT PORTAL LINK FT
You can access the FollowMyHealth Patient Portal offered by [facility] by registering at the following website: [email or web address]. By joining [employer]’s FollowMyHealth portal, you will also be able to view your health information using other applications (apps) compatible with our system.

## 2024-10-18 NOTE — ED PROVIDER NOTE - CARE PLAN
Principal Discharge DX:	Left against medical advice   1 Principal Discharge DX:	Left against medical advice  Secondary Diagnosis:	Fall

## 2024-10-18 NOTE — ED PROVIDER NOTE - NSFOLLOWUPINSTRUCTIONS_ED_ALL_ED_FT
RETURN FOR ANY WORSENING SYMPTOMS OR FOR REPEAT EVALUATION.     YOU LEFT AGAINST MEDICAL ADVICE AND DID NOT HAVE ANY TESTING DONE.

## 2024-10-18 NOTE — ED PROVIDER NOTE - ATTENDING APP SHARED VISIT CONTRIBUTION OF CARE
69-year-old male past medical history noted presents via EMS for evaluation after he fell earlier today.  Patient was here last week after he fell and had a cardioversion at that time as well.  Patient is without any complaints at this time.  Patient is refusing any workup or imaging.  On exam patient is in NAD, AOx3, no signs of head trauma, no midline vertebral tenderness, extremities are atraumatic

## 2024-10-18 NOTE — ED PROVIDER NOTE - CLINICAL SUMMARY MEDICAL DECISION MAKING FREE TEXT BOX
Patient refusing any testing here today.  Patient states that if he changes his mind he can come back at any time.  Will AMA at this time.

## 2024-10-18 NOTE — ED ADULT TRIAGE NOTE - CHIEF COMPLAINT QUOTE
{T BIBA via [employer] for mechanical fall, no LOC or injury. Visiting nurse came & saw pt & sent to hospital for evaluation of 3-4 different defibrilations from AICD 3 days ago which was not evaluated. Pt denies chest pain

## 2024-10-18 NOTE — ED PROVIDER NOTE - PHYSICAL EXAMINATION
As Follows:  CONST: Well appearing in NAD  EYES: PERRL, EOMI, Sclera and conjunctiva clear.   CARD: No murmurs, rubs, or gallops; Normal rate and rhythm, paced  RESP: BS Equal B/L, No wheezes, rhonchi or rales. No distress or accessory breathing  GI: Soft, non-tender, non-distended.  MS: No clear trauma, deformity, laceration, abrasion. Normal ROM in all extremities. No midline Cervical/Thoracic/Lumbar spinal tenderness.  SKIN: Warm, dry, no acute rashes. MMM  NEURO: A&Ox3, No focal deficits. Strength and sensation intact.

## 2024-10-18 NOTE — ED PROVIDER NOTE - PROGRESS NOTE DETAILS
KA - Attempted to repeat offer for labs and imaging.  Patient continues to refuse and would like to leave AMA.    The patient wishes to leave against medical advice.  I have discussed the risks, benefits and alternatives (including the possibility of worsening of disease, pain, permanent disability, and/or death) with the patient and his/her family (if available).  The patient voices understanding of these risks, benefits, and alternatives and still wishes to sign out against medical advice.  The patient is awake, alert, oriented  x 3 and has demonstrated capacity to refuse/direct care.  I have advised the patient that they can and should return immediately should they develop any worse/different/additional symptoms, or if they change their mind and want to continue their care.

## 2024-10-18 NOTE — ED PROVIDER NOTE - OBJECTIVE STATEMENT
Patient is a 69-year-old male with past medical history per previous chart review presents after being brought in by EMS for evaluation after 1 fall last week and 1 fall earlier today that was unwitnessed with unknown head injury.  He reports being on Eliquis.  He is refusing medical care at this time including any labs, imaging, other tests.

## 2024-10-22 DIAGNOSIS — Z88.2 ALLERGY STATUS TO SULFONAMIDES: ICD-10-CM

## 2024-10-22 DIAGNOSIS — Z79.02 LONG TERM (CURRENT) USE OF ANTITHROMBOTICS/ANTIPLATELETS: ICD-10-CM

## 2024-10-22 DIAGNOSIS — Z91.81 HISTORY OF FALLING: ICD-10-CM

## 2024-10-22 DIAGNOSIS — Z86.73 PERSONAL HISTORY OF TRANSIENT ISCHEMIC ATTACK (TIA), AND CEREBRAL INFARCTION WITHOUT RESIDUAL DEFICITS: ICD-10-CM

## 2024-10-23 ENCOUNTER — APPOINTMENT (OUTPATIENT)
Dept: ELECTROPHYSIOLOGY | Facility: CLINIC | Age: 69
End: 2024-10-23
Payer: MEDICARE

## 2024-10-23 VITALS
SYSTOLIC BLOOD PRESSURE: 111 MMHG | HEART RATE: 96 BPM | TEMPERATURE: 97.1 F | WEIGHT: 150 LBS | BODY MASS INDEX: 26.58 KG/M2 | HEIGHT: 63 IN | DIASTOLIC BLOOD PRESSURE: 77 MMHG

## 2024-10-23 DIAGNOSIS — I10 ESSENTIAL (PRIMARY) HYPERTENSION: ICD-10-CM

## 2024-10-23 DIAGNOSIS — I25.5 ISCHEMIC CARDIOMYOPATHY: ICD-10-CM

## 2024-10-23 DIAGNOSIS — Z45.02 ENCOUNTER FOR ADJUSTMENT AND MANAGEMENT OF AUTOMATIC IMPLANTABLE CARDIAC DEFIBRILLATOR: ICD-10-CM

## 2024-10-23 DIAGNOSIS — I48.0 PAROXYSMAL ATRIAL FIBRILLATION: ICD-10-CM

## 2024-10-23 PROCEDURE — 99215 OFFICE O/P EST HI 40 MIN: CPT

## 2024-10-23 PROCEDURE — 93000 ELECTROCARDIOGRAM COMPLETE: CPT | Mod: 59

## 2024-10-23 PROCEDURE — 93284 PRGRMG EVAL IMPLANTABLE DFB: CPT

## 2024-10-23 PROCEDURE — 93290 INTERROG DEV EVAL ICPMS IP: CPT | Mod: 26

## 2024-10-23 PROCEDURE — G2211 COMPLEX E/M VISIT ADD ON: CPT

## 2024-10-23 RX ORDER — METOPROLOL SUCCINATE 200 MG/1
200 TABLET, EXTENDED RELEASE ORAL
Qty: 90 | Refills: 3 | Status: ACTIVE | COMMUNITY

## 2024-10-23 RX ORDER — AMIODARONE HYDROCHLORIDE 200 MG/1
200 TABLET ORAL
Qty: 30 | Refills: 3 | Status: ACTIVE | COMMUNITY

## 2024-10-23 RX ORDER — DAPAGLIFLOZIN 10 MG/1
10 TABLET, FILM COATED ORAL DAILY
Refills: 0 | Status: ACTIVE | COMMUNITY

## 2024-10-23 RX ORDER — ESZOPICLONE 1 MG/1
1 TABLET, COATED ORAL
Refills: 0 | Status: ACTIVE | COMMUNITY

## 2024-11-04 NOTE — OCCUPATIONAL THERAPY INITIAL EVALUATION ADULT - GROOMING, PREVIOUS LEVEL OF FUNCTION, OT EVAL
Lab Results   Component Value Date    HGBA1C 5.3 09/07/2024     Well controlled with previous A1c of 5.6.   Currently prescribed: Metformin 1500 mg/day, tirzepatide 10 mg once a week injection  on statin & no ACEi/ARB  Previously: on insulin and with endocrinology got off of insulin and now on injectables.   Currently using Dexcom  Med Adjusted: none   Continue with endocrinology.   Advised low-carb, low-sodium diet and limit snacking, really working on building good habits with nutritional intake and exercising regularly.   Reviewed hypoglycemia protocol, will monitor and discuss frequency at each apt with plan to adjust management to avoid occurences          
BP Readings from Last 3 Encounters:   11/04/24 120/58   10/11/24 120/62   08/05/24 124/70      Currently not prescribed any meds.  However, taking Lasix 20 mg once every 10 days for edema.   Previously  previously on medication but improved with weight loss.   Today pt reports: no concerns.   Assessment: controlled off medication  Med changes: None. Continue to focus on lifestyle improvement only.   Lifestyle modifications recommended, including reduced sodium intake, regular exercise, maintaining a healthy weight, limiting alcohol consumption, and/or avoiding cig smoking.        
Lab Results   Component Value Date    [id number] 1.223 09/07/2024      Currently prescribed levothyroxine 75 mcg daily in the morning  Today pt reports: Taking as prescribed.  Assessment: Controlled  Med changes: None. Continue current regimen.   Follow up with thyroid blood work every 3-6 months, sooner if noted symptom changes.         
With history of hiatal hernia.   Currently on pantoprazole. Follows GI.   Has been on PPI for a long time now. Tried to taper but unsuccessful.        
independent

## 2024-11-19 ENCOUNTER — APPOINTMENT (OUTPATIENT)
Dept: NEUROLOGY | Facility: CLINIC | Age: 69
End: 2024-11-19

## 2024-11-26 ENCOUNTER — APPOINTMENT (OUTPATIENT)
Dept: CARDIOLOGY | Facility: CLINIC | Age: 69
End: 2024-11-26

## 2024-11-27 ENCOUNTER — NON-APPOINTMENT (OUTPATIENT)
Age: 69
End: 2024-11-27

## 2024-11-27 ENCOUNTER — APPOINTMENT (OUTPATIENT)
Dept: CARDIOLOGY | Facility: CLINIC | Age: 69
End: 2024-11-27
Payer: MEDICARE

## 2024-11-27 VITALS
WEIGHT: 171 LBS | DIASTOLIC BLOOD PRESSURE: 80 MMHG | SYSTOLIC BLOOD PRESSURE: 120 MMHG | HEART RATE: 84 BPM | HEIGHT: 63 IN | BODY MASS INDEX: 30.3 KG/M2

## 2024-11-27 DIAGNOSIS — E11.49 TYPE 2 DIABETES MELLITUS WITH OTHER DIABETIC NEUROLOGICAL COMPLICATION: ICD-10-CM

## 2024-11-27 DIAGNOSIS — I25.10 ATHEROSCLEROTIC HEART DISEASE OF NATIVE CORONARY ARTERY W/OUT ANGINA PECTORIS: ICD-10-CM

## 2024-11-27 DIAGNOSIS — I73.9 PERIPHERAL VASCULAR DISEASE, UNSPECIFIED: ICD-10-CM

## 2024-11-27 DIAGNOSIS — I25.5 ISCHEMIC CARDIOMYOPATHY: ICD-10-CM

## 2024-11-27 DIAGNOSIS — I50.9 HEART FAILURE, UNSPECIFIED: ICD-10-CM

## 2024-11-27 DIAGNOSIS — E78.5 HYPERLIPIDEMIA, UNSPECIFIED: ICD-10-CM

## 2024-11-27 DIAGNOSIS — Z86.73 PERSONAL HISTORY OF TRANSIENT ISCHEMIC ATTACK (TIA), AND CEREBRAL INFARCTION W/OUT RESIDUAL DEFICITS: ICD-10-CM

## 2024-11-27 DIAGNOSIS — Z79.4 TYPE 2 DIABETES MELLITUS WITH OTHER DIABETIC NEUROLOGICAL COMPLICATION: ICD-10-CM

## 2024-11-27 DIAGNOSIS — R06.09 OTHER FORMS OF DYSPNEA: ICD-10-CM

## 2024-11-27 DIAGNOSIS — I10 ESSENTIAL (PRIMARY) HYPERTENSION: ICD-10-CM

## 2024-11-27 DIAGNOSIS — Z95.3 PRESENCE OF XENOGENIC HEART VALVE: ICD-10-CM

## 2024-11-27 PROCEDURE — 99215 OFFICE O/P EST HI 40 MIN: CPT

## 2024-11-27 PROCEDURE — G2211 COMPLEX E/M VISIT ADD ON: CPT

## 2024-11-27 PROCEDURE — 93000 ELECTROCARDIOGRAM COMPLETE: CPT

## 2024-12-19 ENCOUNTER — OUTPATIENT (OUTPATIENT)
Dept: OUTPATIENT SERVICES | Facility: HOSPITAL | Age: 69
LOS: 1 days | End: 2024-12-19
Payer: MEDICARE

## 2024-12-19 ENCOUNTER — RESULT REVIEW (OUTPATIENT)
Age: 69
End: 2024-12-19

## 2024-12-19 VITALS
OXYGEN SATURATION: 99 % | SYSTOLIC BLOOD PRESSURE: 148 MMHG | TEMPERATURE: 97 F | RESPIRATION RATE: 18 BRPM | WEIGHT: 175.05 LBS | DIASTOLIC BLOOD PRESSURE: 80 MMHG | HEIGHT: 63 IN | HEART RATE: 80 BPM

## 2024-12-19 DIAGNOSIS — Z95.2 PRESENCE OF PROSTHETIC HEART VALVE: Chronic | ICD-10-CM

## 2024-12-19 DIAGNOSIS — Z01.818 ENCOUNTER FOR OTHER PREPROCEDURAL EXAMINATION: ICD-10-CM

## 2024-12-19 DIAGNOSIS — I48.19 OTHER PERSISTENT ATRIAL FIBRILLATION: ICD-10-CM

## 2024-12-19 DIAGNOSIS — Z98.890 OTHER SPECIFIED POSTPROCEDURAL STATES: Chronic | ICD-10-CM

## 2024-12-19 DIAGNOSIS — Z95.5 PRESENCE OF CORONARY ANGIOPLASTY IMPLANT AND GRAFT: Chronic | ICD-10-CM

## 2024-12-19 LAB
ALBUMIN SERPL ELPH-MCNC: 4.3 G/DL — SIGNIFICANT CHANGE UP (ref 3.5–5.2)
ALP SERPL-CCNC: 83 U/L — SIGNIFICANT CHANGE UP (ref 30–115)
ALT FLD-CCNC: 13 U/L — SIGNIFICANT CHANGE UP (ref 0–41)
ANION GAP SERPL CALC-SCNC: 12 MMOL/L — SIGNIFICANT CHANGE UP (ref 7–14)
AST SERPL-CCNC: 15 U/L — SIGNIFICANT CHANGE UP (ref 0–41)
BASOPHILS # BLD AUTO: 0.11 K/UL — SIGNIFICANT CHANGE UP (ref 0–0.2)
BASOPHILS NFR BLD AUTO: 0.8 % — SIGNIFICANT CHANGE UP (ref 0–1)
BILIRUB SERPL-MCNC: 0.5 MG/DL — SIGNIFICANT CHANGE UP (ref 0.2–1.2)
BLD GP AB SCN SERPL QL: SIGNIFICANT CHANGE UP
BUN SERPL-MCNC: 18 MG/DL — SIGNIFICANT CHANGE UP (ref 10–20)
CALCIUM SERPL-MCNC: 10.1 MG/DL — SIGNIFICANT CHANGE UP (ref 8.4–10.5)
CHLORIDE SERPL-SCNC: 98 MMOL/L — SIGNIFICANT CHANGE UP (ref 98–110)
CO2 SERPL-SCNC: 24 MMOL/L — SIGNIFICANT CHANGE UP (ref 17–32)
CREAT SERPL-MCNC: 1.6 MG/DL — HIGH (ref 0.7–1.5)
EGFR: 46 ML/MIN/1.73M2 — LOW
EOSINOPHIL # BLD AUTO: 0.26 K/UL — SIGNIFICANT CHANGE UP (ref 0–0.7)
EOSINOPHIL NFR BLD AUTO: 2 % — SIGNIFICANT CHANGE UP (ref 0–8)
GLUCOSE SERPL-MCNC: 522 MG/DL — CRITICAL HIGH (ref 70–99)
HCT VFR BLD CALC: 50 % — SIGNIFICANT CHANGE UP (ref 42–52)
HGB BLD-MCNC: 16.3 G/DL — SIGNIFICANT CHANGE UP (ref 14–18)
IMM GRANULOCYTES NFR BLD AUTO: 0.4 % — HIGH (ref 0.1–0.3)
LYMPHOCYTES # BLD AUTO: 1.87 K/UL — SIGNIFICANT CHANGE UP (ref 1.2–3.4)
LYMPHOCYTES # BLD AUTO: 14.4 % — LOW (ref 20.5–51.1)
MCHC RBC-ENTMCNC: 30.1 PG — SIGNIFICANT CHANGE UP (ref 27–31)
MCHC RBC-ENTMCNC: 32.6 G/DL — SIGNIFICANT CHANGE UP (ref 32–37)
MCV RBC AUTO: 92.3 FL — SIGNIFICANT CHANGE UP (ref 80–94)
MONOCYTES # BLD AUTO: 0.77 K/UL — HIGH (ref 0.1–0.6)
MONOCYTES NFR BLD AUTO: 5.9 % — SIGNIFICANT CHANGE UP (ref 1.7–9.3)
NEUTROPHILS # BLD AUTO: 9.92 K/UL — HIGH (ref 1.4–6.5)
NEUTROPHILS NFR BLD AUTO: 76.5 % — HIGH (ref 42.2–75.2)
NRBC # BLD: 0 /100 WBCS — SIGNIFICANT CHANGE UP (ref 0–0)
PLATELET # BLD AUTO: 309 K/UL — SIGNIFICANT CHANGE UP (ref 130–400)
PMV BLD: 12.4 FL — HIGH (ref 7.4–10.4)
POTASSIUM SERPL-MCNC: 4.5 MMOL/L — SIGNIFICANT CHANGE UP (ref 3.5–5)
POTASSIUM SERPL-SCNC: 4.5 MMOL/L — SIGNIFICANT CHANGE UP (ref 3.5–5)
PROT SERPL-MCNC: 6.9 G/DL — SIGNIFICANT CHANGE UP (ref 6–8)
RBC # BLD: 5.42 M/UL — SIGNIFICANT CHANGE UP (ref 4.7–6.1)
RBC # FLD: 13.6 % — SIGNIFICANT CHANGE UP (ref 11.5–14.5)
SODIUM SERPL-SCNC: 134 MMOL/L — LOW (ref 135–146)
WBC # BLD: 12.98 K/UL — HIGH (ref 4.8–10.8)
WBC # FLD AUTO: 12.98 K/UL — HIGH (ref 4.8–10.8)

## 2024-12-19 PROCEDURE — 86850 RBC ANTIBODY SCREEN: CPT

## 2024-12-19 PROCEDURE — 85025 COMPLETE CBC W/AUTO DIFF WBC: CPT

## 2024-12-19 PROCEDURE — 93010 ELECTROCARDIOGRAM REPORT: CPT

## 2024-12-19 PROCEDURE — 80053 COMPREHEN METABOLIC PANEL: CPT

## 2024-12-19 PROCEDURE — 93005 ELECTROCARDIOGRAM TRACING: CPT

## 2024-12-19 PROCEDURE — 99214 OFFICE O/P EST MOD 30 MIN: CPT | Mod: 25

## 2024-12-19 PROCEDURE — 71046 X-RAY EXAM CHEST 2 VIEWS: CPT

## 2024-12-19 PROCEDURE — 86900 BLOOD TYPING SEROLOGIC ABO: CPT

## 2024-12-19 PROCEDURE — 36415 COLL VENOUS BLD VENIPUNCTURE: CPT

## 2024-12-19 PROCEDURE — 71046 X-RAY EXAM CHEST 2 VIEWS: CPT | Mod: 26

## 2024-12-19 PROCEDURE — 86901 BLOOD TYPING SEROLOGIC RH(D): CPT

## 2024-12-19 RX ORDER — INSULIN DEGLUDEC 100 U/ML
15 INJECTION, SOLUTION SUBCUTANEOUS
Refills: 0 | DISCHARGE

## 2024-12-19 NOTE — H&P PST ADULT - REASON FOR ADMISSION
70 y/o M here for PAST. Patient. with history of CAD s/p PCIs (to RCA and LAD), AS s/p bioprosthetic AVR, ICM (EF 35-40%), s/p BiV ICD in   2020 with inappropriate shocks 10/24 for paroxysmal AFib with RVR, HTN, HLD, COPD, Smoker and TIA. Due to the shocks he states he needs an afib ablation.

## 2024-12-19 NOTE — H&P PST ADULT - NSANTHOSAYNRD_GEN_A_CORE
No. LAW screening performed.  STOP BANG Legend: 0-2 = LOW Risk; 3-4 = INTERMEDIATE Risk; 5-8 = HIGH Risk

## 2024-12-19 NOTE — H&P PST ADULT - HISTORY OF PRESENT ILLNESS
PATIENT DENIES CHEST PAIN,  PALPITATIONS, COUGHING, FEVER, DYSURIA.  + SHORTNESS OF BREATH WHEN LAYING FLAT.     NO COUGH, FEVER, SORE THROAT, HEADACHE, LOSS OF TASTE OR SMELL. NO KNOWN EXPOSURE TO ANYONE WITH COVID. PATIENT WAS INSTRUCTED TO ISOLATE FROM NOW UNTIL THE SURGERY.    Anesthesia Alert  NO--Difficult Airway  NO--History of neck surgery or radiation  NO--Limited ROM of neck  NO--History of Malignant hyperthermia  NO--Personal or family history of Pseudocholinesterase deficiency  NO--Prior Anesthesia Complication  NO--Latex Allergy  NO--Loose teeth  NO--History of Rheumatoid Arthritis  NO--LAW  + bleeding risk  (ON ELIQUIS & PLAVIX)  AICD    RCRI=2  DASI= 3.97 METS

## 2024-12-20 DIAGNOSIS — I48.19 OTHER PERSISTENT ATRIAL FIBRILLATION: ICD-10-CM

## 2024-12-20 DIAGNOSIS — Z01.818 ENCOUNTER FOR OTHER PREPROCEDURAL EXAMINATION: ICD-10-CM

## 2024-12-26 ENCOUNTER — TRANSCRIPTION ENCOUNTER (OUTPATIENT)
Age: 69
End: 2024-12-26

## 2024-12-26 ENCOUNTER — INPATIENT (INPATIENT)
Facility: HOSPITAL | Age: 69
LOS: 0 days | Discharge: HOME | DRG: 291 | End: 2024-12-26
Attending: INTERNAL MEDICINE | Admitting: INTERNAL MEDICINE
Payer: MEDICARE

## 2024-12-26 VITALS
RESPIRATION RATE: 28 BRPM | HEART RATE: 90 BPM | DIASTOLIC BLOOD PRESSURE: 92 MMHG | OXYGEN SATURATION: 98 % | SYSTOLIC BLOOD PRESSURE: 156 MMHG | HEIGHT: 63 IN | WEIGHT: 169.98 LBS

## 2024-12-26 VITALS
TEMPERATURE: 99 F | SYSTOLIC BLOOD PRESSURE: 147 MMHG | HEART RATE: 87 BPM | RESPIRATION RATE: 33 BRPM | DIASTOLIC BLOOD PRESSURE: 86 MMHG | OXYGEN SATURATION: 95 %

## 2024-12-26 DIAGNOSIS — Z98.890 OTHER SPECIFIED POSTPROCEDURAL STATES: Chronic | ICD-10-CM

## 2024-12-26 DIAGNOSIS — Z95.2 PRESENCE OF PROSTHETIC HEART VALVE: Chronic | ICD-10-CM

## 2024-12-26 DIAGNOSIS — Z95.5 PRESENCE OF CORONARY ANGIOPLASTY IMPLANT AND GRAFT: Chronic | ICD-10-CM

## 2024-12-26 DIAGNOSIS — I50.9 HEART FAILURE, UNSPECIFIED: ICD-10-CM

## 2024-12-26 LAB
ALBUMIN SERPL ELPH-MCNC: 4.2 G/DL — SIGNIFICANT CHANGE UP (ref 3.5–5.2)
ALP SERPL-CCNC: 69 U/L — SIGNIFICANT CHANGE UP (ref 30–115)
ALT FLD-CCNC: 17 U/L — SIGNIFICANT CHANGE UP (ref 0–41)
ANION GAP SERPL CALC-SCNC: 12 MMOL/L — SIGNIFICANT CHANGE UP (ref 7–14)
AST SERPL-CCNC: 27 U/L — SIGNIFICANT CHANGE UP (ref 0–41)
BASE EXCESS BLDV CALC-SCNC: -2.1 MMOL/L — LOW (ref -2–3)
BASOPHILS # BLD AUTO: 0.13 K/UL — SIGNIFICANT CHANGE UP (ref 0–0.2)
BASOPHILS NFR BLD AUTO: 0.9 % — SIGNIFICANT CHANGE UP (ref 0–1)
BILIRUB SERPL-MCNC: 0.4 MG/DL — SIGNIFICANT CHANGE UP (ref 0.2–1.2)
BUN SERPL-MCNC: 18 MG/DL — SIGNIFICANT CHANGE UP (ref 10–20)
CA-I SERPL-SCNC: 1.27 MMOL/L — SIGNIFICANT CHANGE UP (ref 1.15–1.33)
CALCIUM SERPL-MCNC: 9.7 MG/DL — SIGNIFICANT CHANGE UP (ref 8.4–10.5)
CHLORIDE SERPL-SCNC: 101 MMOL/L — SIGNIFICANT CHANGE UP (ref 98–110)
CO2 SERPL-SCNC: 23 MMOL/L — SIGNIFICANT CHANGE UP (ref 17–32)
CREAT SERPL-MCNC: 1.5 MG/DL — SIGNIFICANT CHANGE UP (ref 0.7–1.5)
EGFR: 50 ML/MIN/1.73M2 — LOW
EOSINOPHIL # BLD AUTO: 0.31 K/UL — SIGNIFICANT CHANGE UP (ref 0–0.7)
EOSINOPHIL NFR BLD AUTO: 2 % — SIGNIFICANT CHANGE UP (ref 0–8)
FLUAV AG NPH QL: SIGNIFICANT CHANGE UP
FLUBV AG NPH QL: SIGNIFICANT CHANGE UP
GAS PNL BLDV: 134 MMOL/L — LOW (ref 136–145)
GAS PNL BLDV: SIGNIFICANT CHANGE UP
GLUCOSE SERPL-MCNC: 193 MG/DL — HIGH (ref 70–99)
HCO3 BLDV-SCNC: 27 MMOL/L — SIGNIFICANT CHANGE UP (ref 22–29)
HCT VFR BLD CALC: 48.9 % — SIGNIFICANT CHANGE UP (ref 42–52)
HCT VFR BLDA CALC: 49 % — SIGNIFICANT CHANGE UP (ref 39–51)
HGB BLD CALC-MCNC: 16.4 G/DL — SIGNIFICANT CHANGE UP (ref 12.6–17.4)
HGB BLD-MCNC: 15.7 G/DL — SIGNIFICANT CHANGE UP (ref 14–18)
IMM GRANULOCYTES NFR BLD AUTO: 0.9 % — HIGH (ref 0.1–0.3)
LACTATE BLDV-MCNC: 3 MMOL/L — HIGH (ref 0.5–2)
LYMPHOCYTES # BLD AUTO: 1.97 K/UL — SIGNIFICANT CHANGE UP (ref 1.2–3.4)
LYMPHOCYTES # BLD AUTO: 13 % — LOW (ref 20.5–51.1)
MCHC RBC-ENTMCNC: 29.4 PG — SIGNIFICANT CHANGE UP (ref 27–31)
MCHC RBC-ENTMCNC: 32.1 G/DL — SIGNIFICANT CHANGE UP (ref 32–37)
MCV RBC AUTO: 91.6 FL — SIGNIFICANT CHANGE UP (ref 80–94)
MONOCYTES # BLD AUTO: 1.03 K/UL — HIGH (ref 0.1–0.6)
MONOCYTES NFR BLD AUTO: 6.8 % — SIGNIFICANT CHANGE UP (ref 1.7–9.3)
NEUTROPHILS # BLD AUTO: 11.63 K/UL — HIGH (ref 1.4–6.5)
NEUTROPHILS NFR BLD AUTO: 76.4 % — HIGH (ref 42.2–75.2)
NRBC # BLD: 0 /100 WBCS — SIGNIFICANT CHANGE UP (ref 0–0)
NT-PROBNP SERPL-SCNC: 3579 PG/ML — HIGH (ref 0–300)
PCO2 BLDV: 63 MMHG — HIGH (ref 42–55)
PH BLDV: 7.24 — LOW (ref 7.32–7.43)
PLATELET # BLD AUTO: 364 K/UL — SIGNIFICANT CHANGE UP (ref 130–400)
PMV BLD: 11.5 FL — HIGH (ref 7.4–10.4)
PO2 BLDV: 20 MMHG — LOW (ref 25–45)
POTASSIUM BLDV-SCNC: 4.3 MMOL/L — SIGNIFICANT CHANGE UP (ref 3.5–5.1)
POTASSIUM SERPL-MCNC: 4.7 MMOL/L — SIGNIFICANT CHANGE UP (ref 3.5–5)
POTASSIUM SERPL-SCNC: 4.7 MMOL/L — SIGNIFICANT CHANGE UP (ref 3.5–5)
PROT SERPL-MCNC: 6.7 G/DL — SIGNIFICANT CHANGE UP (ref 6–8)
RBC # BLD: 5.34 M/UL — SIGNIFICANT CHANGE UP (ref 4.7–6.1)
RBC # FLD: 13.8 % — SIGNIFICANT CHANGE UP (ref 11.5–14.5)
RSV RNA NPH QL NAA+NON-PROBE: SIGNIFICANT CHANGE UP
SAO2 % BLDV: 23.7 % — LOW (ref 67–88)
SARS-COV-2 RNA SPEC QL NAA+PROBE: SIGNIFICANT CHANGE UP
SODIUM SERPL-SCNC: 136 MMOL/L — SIGNIFICANT CHANGE UP (ref 135–146)
TROPONIN T, HIGH SENSITIVITY RESULT: 58 NG/L — CRITICAL HIGH (ref 6–21)
TROPONIN T, HIGH SENSITIVITY RESULT: 64 NG/L — CRITICAL HIGH (ref 6–21)
WBC # BLD: 15.2 K/UL — HIGH (ref 4.8–10.8)
WBC # FLD AUTO: 15.2 K/UL — HIGH (ref 4.8–10.8)

## 2024-12-26 PROCEDURE — 99223 1ST HOSP IP/OBS HIGH 75: CPT

## 2024-12-26 PROCEDURE — 99291 CRITICAL CARE FIRST HOUR: CPT

## 2024-12-26 PROCEDURE — 71045 X-RAY EXAM CHEST 1 VIEW: CPT | Mod: 26

## 2024-12-26 RX ORDER — DULAGLUTIDE 4.5 MG/.5ML
1.5 INJECTION, SOLUTION SUBCUTANEOUS
Refills: 0 | DISCHARGE

## 2024-12-26 RX ORDER — APIXABAN 5 MG/1
5 TABLET, FILM COATED ORAL EVERY 12 HOURS
Refills: 0 | Status: DISCONTINUED | OUTPATIENT
Start: 2024-12-26 | End: 2024-12-26

## 2024-12-26 RX ORDER — CEFTRIAXONE SODIUM 1 G/1
1000 INJECTION, POWDER, FOR SOLUTION INTRAMUSCULAR; INTRAVENOUS ONCE
Refills: 0 | Status: COMPLETED | OUTPATIENT
Start: 2024-12-26 | End: 2024-12-26

## 2024-12-26 RX ORDER — MAG HYDROX/ALUMINUM HYD/SIMETH 200-200-20
30 SUSPENSION, ORAL (FINAL DOSE FORM) ORAL EVERY 4 HOURS
Refills: 0 | Status: DISCONTINUED | OUTPATIENT
Start: 2024-12-26 | End: 2024-12-26

## 2024-12-26 RX ORDER — GLUCAGON INJECTION, SOLUTION 0.5 MG/.1ML
1 INJECTION, SOLUTION SUBCUTANEOUS ONCE
Refills: 0 | Status: DISCONTINUED | OUTPATIENT
Start: 2024-12-26 | End: 2024-12-26

## 2024-12-26 RX ORDER — SODIUM CHLORIDE 9 MG/ML
1000 INJECTION, SOLUTION INTRAVENOUS
Refills: 0 | Status: DISCONTINUED | OUTPATIENT
Start: 2024-12-26 | End: 2024-12-26

## 2024-12-26 RX ORDER — ATORVASTATIN CALCIUM 40 MG/1
80 TABLET, FILM COATED ORAL AT BEDTIME
Refills: 0 | Status: DISCONTINUED | OUTPATIENT
Start: 2024-12-26 | End: 2024-12-26

## 2024-12-26 RX ORDER — SACUBITRIL AND VALSARTAN 24; 26 MG/1; MG/1
1 TABLET, FILM COATED ORAL
Refills: 0 | Status: DISCONTINUED | OUTPATIENT
Start: 2024-12-26 | End: 2024-12-26

## 2024-12-26 RX ORDER — DEXTROSE MONOHYDRATE 25 G/50ML
12.5 INJECTION, SOLUTION INTRAVENOUS ONCE
Refills: 0 | Status: DISCONTINUED | OUTPATIENT
Start: 2024-12-26 | End: 2024-12-26

## 2024-12-26 RX ORDER — AMIODARONE HYDROCHLORIDE 200 MG/1
200 TABLET ORAL DAILY
Refills: 0 | Status: DISCONTINUED | OUTPATIENT
Start: 2024-12-26 | End: 2024-12-26

## 2024-12-26 RX ORDER — FENOFIBRATE 48 MG/1
145 TABLET ORAL DAILY
Refills: 0 | Status: DISCONTINUED | OUTPATIENT
Start: 2024-12-26 | End: 2024-12-26

## 2024-12-26 RX ORDER — AZITHROMYCIN MONOHYDRATE 200 MG/5ML
500 POWDER, FOR SUSPENSION ORAL ONCE
Refills: 0 | Status: COMPLETED | OUTPATIENT
Start: 2024-12-26 | End: 2024-12-26

## 2024-12-26 RX ORDER — EZETIMIBE 10 MG/1
10 TABLET ORAL DAILY
Refills: 0 | Status: DISCONTINUED | OUTPATIENT
Start: 2024-12-26 | End: 2024-12-26

## 2024-12-26 RX ORDER — ACETAMINOPHEN 80 MG/.8ML
650 SOLUTION/ DROPS ORAL EVERY 6 HOURS
Refills: 0 | Status: DISCONTINUED | OUTPATIENT
Start: 2024-12-26 | End: 2024-12-26

## 2024-12-26 RX ORDER — FUROSEMIDE 20 MG
5 TABLET ORAL
Qty: 500 | Refills: 0 | Status: DISCONTINUED | OUTPATIENT
Start: 2024-12-26 | End: 2024-12-26

## 2024-12-26 RX ORDER — DEXTROSE MONOHYDRATE 25 G/50ML
25 INJECTION, SOLUTION INTRAVENOUS ONCE
Refills: 0 | Status: DISCONTINUED | OUTPATIENT
Start: 2024-12-26 | End: 2024-12-26

## 2024-12-26 RX ORDER — CHLORHEXIDINE GLUCONATE 1.2 MG/ML
1 RINSE ORAL
Refills: 0 | Status: DISCONTINUED | OUTPATIENT
Start: 2024-12-26 | End: 2024-12-26

## 2024-12-26 RX ORDER — CLOPIDOGREL BISULFATE 75 MG/1
75 TABLET, FILM COATED ORAL DAILY
Refills: 0 | Status: DISCONTINUED | OUTPATIENT
Start: 2024-12-26 | End: 2024-12-26

## 2024-12-26 RX ORDER — GINKGO BILOBA 40 MG
3 CAPSULE ORAL AT BEDTIME
Refills: 0 | Status: DISCONTINUED | OUTPATIENT
Start: 2024-12-26 | End: 2024-12-26

## 2024-12-26 RX ORDER — METOPROLOL TARTRATE 50 MG
200 TABLET ORAL DAILY
Refills: 0 | Status: DISCONTINUED | OUTPATIENT
Start: 2024-12-26 | End: 2024-12-26

## 2024-12-26 RX ORDER — ONDANSETRON 4 MG/1
4 TABLET ORAL EVERY 8 HOURS
Refills: 0 | Status: DISCONTINUED | OUTPATIENT
Start: 2024-12-26 | End: 2024-12-26

## 2024-12-26 RX ORDER — FUROSEMIDE 20 MG
40 TABLET ORAL ONCE
Refills: 0 | Status: COMPLETED | OUTPATIENT
Start: 2024-12-26 | End: 2024-12-26

## 2024-12-26 RX ORDER — INSULIN LISPRO 100/ML
VIAL (ML) SUBCUTANEOUS
Refills: 0 | Status: DISCONTINUED | OUTPATIENT
Start: 2024-12-26 | End: 2024-12-26

## 2024-12-26 RX ORDER — DEXTROSE MONOHYDRATE 25 G/50ML
15 INJECTION, SOLUTION INTRAVENOUS ONCE
Refills: 0 | Status: DISCONTINUED | OUTPATIENT
Start: 2024-12-26 | End: 2024-12-26

## 2024-12-26 RX ADMIN — CEFTRIAXONE SODIUM 100 MILLIGRAM(S): 1 INJECTION, POWDER, FOR SOLUTION INTRAMUSCULAR; INTRAVENOUS at 11:36

## 2024-12-26 RX ADMIN — AZITHROMYCIN MONOHYDRATE 255 MILLIGRAM(S): 200 POWDER, FOR SUSPENSION ORAL at 12:36

## 2024-12-26 RX ADMIN — Medication 40 MILLIGRAM(S): at 10:05

## 2024-12-26 NOTE — DISCHARGE NOTE PROVIDER - NSDCFUSCHEDAPPT_GEN_ALL_CORE_FT
Cecilia Carrera  [facility] [address] [place] [address]   Scheduled Appointment: 01/02/2025    Cecilia Carrera  [facility] PreAdmits  Scheduled Appointment: 01/02/2025    [facility]  CARDIOLOGY [address]  Scheduled Appointment: 01/22/2025    [facility]  CARDIOLOGY [address]  Scheduled Appointment: 02/06/2025    [facility]  CARDIOLOGY [address]  Scheduled Appointment: 02/06/2025    [facility]  CARDIOLOGY [address]  Scheduled Appointment: 02/06/2025    Kristin Card  [facility]  CARDIOLOGY [address]  Scheduled Appointment: 02/11/2025

## 2024-12-26 NOTE — DISCHARGE NOTE NURSING/CASE MANAGEMENT/SOCIAL WORK - FINANCIAL ASSISTANCE
[facility] provides services at a reduced cost to those who are determined to be eligible through [facility]’s financial assistance program. Information regarding [facility]’s financial assistance program can be found by going to [email or web address] or by calling [phone].

## 2024-12-26 NOTE — H&P ADULT - HISTORY OF PRESENT ILLNESS
69yoM with a PMhx of chronic HFrEF, s/p ICD, AVR porcine , HTN, pAF s/p ablation, CAD s/p PCI, syncope, HLD, DM, COPD not on home o2, current smoker, whom presents to ed with c/o sudden onset of sob about 4am today at home described as constant, severe, associated with mild chest discomfort, and partially relieved with lasix 40mg ivp and bipap in ED. Pt denies associated fevers, chills, cough, edema, palpitations or dizziness.

## 2024-12-26 NOTE — ED PROVIDER NOTE - OBJECTIVE STATEMENT
69-year-old male with past medical history of syncope, HTN, paroxysmal AFib with RVR, CAD s/p PCI, ICM sp BIV-ICD 2/2020, aortic valve replacement, HLD, DM, HFrEF (EF ~30-35% on Oct 2024), COPD not on home O2 who presents for shortness of breath.  Reports worsening shortness of breath and cough x 2 days.  Per EMS, patient was saturating 90% on room air and was placed on nonrebreather.  Denies fevers, chills, congestion, chest pain, palpitations, nausea, vomiting, diarrhea, vomiting, urinary symptoms, weakness, numbness, lower extremity edema, known sick contacts, recent travel.

## 2024-12-26 NOTE — ED PROVIDER NOTE - ATTENDING CONTRIBUTION TO CARE
I have personally performed a history and physical exam on this patient and personally directed the management of the patient. Patient is a 69-year-old male past medical history of syncope hypertension A-fib EF in October 2020 for 30 to 35% presents for evaluation of sudden onset shortness of breath over the past 48 hours with hypoxia and increased respiratory rate denies any actual chest pain back pain abdominal pain fevers chills vomiting or diaphoresis    On physical exam patient has tachypnea increased work of breathing hypoxic on room air he is normocephalic atraumatic pupils equal round reactive light accommodation extraocular muscles intact oropharynx clear chest reveals evidence of diffuse wheezing increased work of breathing accessory use and retractions abdomen soft nontender nondistended bowel sounds positive extremities full range of motion no focal deficits noted pedal pulses 2+ radial pulses 2+    Assessment plan patient presents for evaluation of worsening shortness of breath most consistent with CHF exacerbation due to increased work of breathing we placed patient on BiPAP routine EKG per my independent evaluation not consistent with STEMI and Oxis with QT prolongation in edition maintain chest x-ray per my independent evaluation not consistent with pneumothorax patient found to have worsening bilateral opacities considering the clinical picture we administered IV antibiotics considering his history of CHF patient was not given a 30 cc/kg bolus as this is contraindicated we obtain blood cultures prior to administering antibiotics and obtain lactated and ordered repeat lactate given history physical I we have consulted ICU who evaluated patient and advises admission to sdu

## 2024-12-26 NOTE — H&P ADULT - NSHPPHYSICALEXAM_GEN_ALL_CORE
Vital Signs Last 24 Hrs  T(C): 36.8 (26 Dec 2024 10:00), Max: 36.8 (26 Dec 2024 10:00)  T(F): 98.2 (26 Dec 2024 10:00), Max: 98.2 (26 Dec 2024 10:00)  HR: 85 (26 Dec 2024 15:12) (75 - 90)  BP: 143/82 (26 Dec 2024 15:12) (139/81 - 156/92)  BP(mean): --  RR: 24 (26 Dec 2024 15:12) (24 - 28)  SpO2: 100% (26 Dec 2024 15:12) (98% - 100%)    PHYSICAL EXAM:      Constitutional: A&Ox4, NAD  Respiratory: decrease bs at b/l bases, no wheeze or rales  Cardiovascular: s1 s2 rrr  Gastrointestinal: soft nt  nd + bs no rebound or guarding  Genitourinary: no cva tenderness  Extremities: normal rom, no edema, calf tenderness  Neurological:no focal deficits  Skin: no rash Vital Signs Last 24 Hrs  T(C): 36.8 (26 Dec 2024 10:00), Max: 36.8 (26 Dec 2024 10:00)  T(F): 98.2 (26 Dec 2024 10:00), Max: 98.2 (26 Dec 2024 10:00)  HR: 85 (26 Dec 2024 15:12) (75 - 90)  BP: 143/82 (26 Dec 2024 15:12) (139/81 - 156/92)  BP(mean): --  RR: 24 (26 Dec 2024 15:12) (24 - 28)  SpO2: 100% (26 Dec 2024 15:12) (98% - 100%)    PHYSICAL EXAM:      Constitutional: A&Ox4, mild sob on bipap pox 95%  Respiratory: decrease bs at b/l bases, no wheeze or rales  Cardiovascular: s1 s2 rrr  Gastrointestinal: soft nt  nd + bs no rebound or guarding  Genitourinary: no cva tenderness  Extremities: normal rom, no edema, calf tenderness  Neurological:no focal deficits  Skin: no rash

## 2024-12-26 NOTE — CHART NOTE - NSCHARTNOTEFT_GEN_A_CORE
Called by RN as patient wishes to leave AMA. Patient seen at bedside to further discuss plan of care. Discussed risks of leaving against medical advice, which includes worsening of current medical condition, up to and including death. Patient understands risks and still wishes to leave. AMA paperwork signed and placed in chart. Dr.El Santos (Attending) made aware.     Will continue to follow, RN to call if any changes.

## 2024-12-26 NOTE — DISCHARGE NOTE NURSING/CASE MANAGEMENT/SOCIAL WORK - PATIENT PORTAL LINK FT
You can access the FollowMyHealth Patient Portal offered by [facility] by registering at the following website: [email or web address]. By joining [facility]’s FollowMyHealth portal, you will also be able to view your health information using other applications (apps) compatible with our system.

## 2024-12-26 NOTE — DISCHARGE NOTE PROVIDER - NSDCCPCAREPLAN_GEN_ALL_CORE_FT
PRINCIPAL DISCHARGE DIAGNOSIS  Diagnosis: CHF exacerbation  Assessment and Plan of Treatment: You were admitted for Chronic heart failure decompensation requiring respiratory support however you elected on leaving the hospital against medical advise despite extensive explanation of the risks of doing so including death.  Congestive heart failure (CHF) is a chronic condition in which the heart has trouble pumping blood. In some cases of heart failure, fluid may back up into your lungs or you may have swelling (edema) in your lower legs. There are many causes of heart failure including high blood pressure, coronary artery disease, abnormal heart valves, heart muscle disease, lung disease, diabetes, etc. Symptoms include shortness of breath with activity or when lying flat, cough, swelling of the legs, fatigue, or increased urination during the night.   SEEK IMMEDIATE MEDICAL CARE IF YOU HAVE ANY OF THE FOLLOWING SYMPTOMS: shortness of breath, change in mental status, chest pain, lightheadedness/dizziness/fainting, or worsening of symptoms including not being able to conduct normal physical activity.

## 2024-12-26 NOTE — PATIENT PROFILE ADULT - DEAF OR HARD OF HEARING?
82 yo F  with PMHx of HTN comes for elective R TKR.     HTN  - s/p right TKR 3/12/24  - pain control  - ortho following    -BP elevated, 188/90, likely reactive, related to pain post op  - on nadolol 40mg qd at home  - continue propranolol 20mg po BID  - add Amlodipine 5mg po qd  - hydralazine 10mg ivp for SBP >180 prn    - EKG NSR, no acute ischemia  - denies anginal pain  - continue ASA     - no evidence of significant volume overload  - check routine TTE    - Will follow with Dr. Troncoso 3/14/24  - Monitor and replete lytes, keep K>4, Mg>2.  - Will continue to follow.    Shital Laura NP  Nurse Practitioner- Cardiology   Call TEAMS no

## 2024-12-26 NOTE — ED ADULT TRIAGE NOTE - SPO2 (%)
Problem: PAIN - ADULT  Goal: Verbalizes/displays adequate comfort level or baseline comfort level  Description: Interventions:  - Encourage patient to monitor pain and request assistance  - Assess pain using appropriate pain scale  - Administer analgesics based on type and severity of pain and evaluate response  - Implement non-pharmacological measures as appropriate and evaluate response  - Consider cultural and social influences on pain and pain management  - Notify physician/advanced practitioner if interventions unsuccessful or patient reports new pain  4/29/2023 0221 by Otoniel Torres  Outcome: Progressing  4/29/2023 0220 by Otoniel Torres  Outcome: Progressing     Problem: INFECTION - ADULT  Goal: Absence or prevention of progression during hospitalization  Description: INTERVENTIONS:  - Assess and monitor for signs and symptoms of infection  - Monitor lab/diagnostic results  - Monitor all insertion sites, i e  indwelling lines, tubes, and drains  - Monitor endotracheal if appropriate and nasal secretions for changes in amount and color  - [place] appropriate cooling/warming therapies per order  - Administer medications as ordered  - Instruct and encourage patient and family to use good hand hygiene technique  - Identify and instruct in appropriate isolation precautions for identified infection/condition  4/29/2023 0221 by Otoniel Torres  Outcome: Progressing  4/29/2023 0220 by Otoniel Torres  Outcome: Progressing  Goal: Absence of fever/infection during neutropenic period  Description: INTERVENTIONS:  - Monitor WBC    4/29/2023 0221 by Otoniel Torres  Outcome: Progressing  4/29/2023 0220 by Otoniel Torres  Outcome: Progressing     Problem: SAFETY ADULT  Goal: Patient will remain free of falls  Description: INTERVENTIONS:  - Educate patient/family on patient safety including physical limitations  - Instruct patient to call for assistance with activity   - Consult OT/PT to assist with strengthening/mobility   - Keep Call bell within reach  - Keep bed low and locked with side rails adjusted as appropriate  - Keep care items and personal belongings within reach  - Initiate and maintain comfort rounds  - Make Fall Risk Sign visible to staff  - Offer Toileting every  Hours, in advance of need  - Initiate/Maintain alarm  - Obtain necessary fall risk management equipment:   - Apply yellow socks and bracelet for high fall risk patients  - Consider moving patient to room near nurses station  4/29/2023 0221 by Raeann Lindsay  Outcome: Progressing  4/29/2023 0220 by Raeann Lindsay  Outcome: Progressing  Goal: Maintain or return to baseline ADL function  Description: INTERVENTIONS:  -  Assess patient's ability to carry out ADLs; assess patient's baseline for ADL function and identify physical deficits which impact ability to perform ADLs (bathing, care of mouth/teeth, toileting, grooming, dressing, etc )  - Assess/evaluate cause of self-care deficits   - Assess range of motion  - Assess patient's mobility; develop plan if impaired  - Assess patient's need for assistive devices and provide as appropriate  - Encourage maximum independence but intervene and supervise when necessary  - Involve family in performance of ADLs  - Assess for home care needs following discharge   - Consider OT consult to assist with ADL evaluation and planning for discharge  - Provide patient education as appropriate  4/29/2023 0221 by Raeann Lindsay  Outcome: Progressing  4/29/2023 0220 by Raeann Lindsay  Outcome: Progressing  Goal: Maintains/Returns to pre admission functional level  Description: INTERVENTIONS:  - Perform BMAT or MOVE assessment daily    - Set and communicate daily mobility goal to care team and patient/family/caregiver  - Collaborate with rehabilitation services on mobility goals if consulted  - Perform Range of Motion 3 times a day  - Reposition patient every 2 hours    - Dangle patient 3 times a day  - Stand patient 3 times a day  - Ambulate patient 3 times a day  - Out of bed to chair 3 times a day   - Out of bed for meals 3  times a day  - Out of bed for toileting  - Record patient progress and toleration of activity level   4/29/2023 0221 by Neymar Gallardo  Outcome: Progressing  4/29/2023 0220 by Neymar Gallardo  Outcome: Progressing     Problem: DISCHARGE PLANNING  Goal: Discharge to home or other facility with appropriate resources  Description: INTERVENTIONS:  - Identify barriers to discharge w/patient and caregiver  - Arrange for needed discharge resources and transportation as appropriate  - Identify discharge learning needs (meds, wound care, etc )  - Arrange for interpretive services to assist at discharge as needed  - Refer to Case Management Department for coordinating discharge planning if the patient needs post-hospital services based on physician/advanced practitioner order or complex needs related to functional status, cognitive ability, or social support system  4/29/2023 0221 by Neymar Gallardo  Outcome: Progressing  4/29/2023 0220 by Neymar Gallardo  Outcome: Progressing     Problem: Knowledge Deficit  Goal: Patient/family/caregiver demonstrates understanding of disease process, treatment plan, medications, and discharge instructions  Description: Complete learning assessment and assess knowledge base  Interventions:  - Provide teaching at level of understanding  - Provide teaching via preferred learning methods  4/29/2023 0221 by Neymar Gallardo  Outcome: Progressing  4/29/2023 0220 by Neymar Gallardo  Outcome: Progressing     Problem: Nutrition/Hydration-ADULT  Goal: Nutrient/Hydration intake appropriate for improving, restoring or maintaining nutritional needs  Description: Monitor and assess patient's nutrition/hydration status for malnutrition  Collaborate with interdisciplinary team and initiate plan and interventions as ordered  Monitor patient's weight and dietary intake as ordered or per policy  Utilize nutrition screening tool and intervene as necessary  Determine patient's food preferences and provide high-protein, high-caloric foods as appropriate       INTERVENTIONS:  - Monitor oral intake, urinary output, labs, and treatment plans  - Assess nutrition and hydration status and recommend course of action  - Evaluate amount of meals eaten  - Assist patient with eating if necessary   - Allow adequate time for meals  - Recommend/ encourage appropriate diets, oral nutritional supplements, and vitamin/mineral supplements  - Order, calculate, and assess calorie counts as needed  - Assess need for intravenous fluids  - Provide nutrition/hydration education as appropriate  - Include patient/family/caregiver in decisions related to nutrition  4/29/2023 0221 by Ean Moody  Outcome: Progressing  4/29/2023 0220 by Ean Moody  Outcome: Progressing     Problem: MOBILITY - ADULT  Goal: Maintain or return to baseline ADL function  Description: INTERVENTIONS:  -  Assess patient's ability to carry out ADLs; assess patient's baseline for ADL function and identify physical deficits which impact ability to perform ADLs (bathing, care of mouth/teeth, toileting, grooming, dressing, etc )  - Assess/evaluate cause of self-care deficits   - Assess range of motion  - Assess patient's mobility; develop plan if impaired  - Assess patient's need for assistive devices and provide as appropriate  - Encourage maximum independence but intervene and supervise when necessary  - Involve family in performance of ADLs  - Assess for home care needs following discharge   - Consider OT consult to assist with ADL evaluation and planning for discharge  - Provide patient education as appropriate  4/29/2023 0221 by Ean Moody  Outcome: Progressing  4/29/2023 0220 by Ean Moody  Outcome: Progressing  Goal: Maintains/Returns to pre admission functional level  Description: INTERVENTIONS:  - Perform BMAT or MOVE assessment daily    - Set and communicate daily mobility goal to care team and patient/family/caregiver  - Collaborate with rehabilitation services on mobility goals if consulted  - Perform Range of Motion  times a day  - Reposition patient every hours    - Dangle patient  times a day  - Stand patient  times a day  - Ambulate patient  times a day  - Out of bed to chair  times a day   - Out of bed for meals  times a day  - Out of bed for toileting  - Record patient progress and toleration of activity level   4/29/2023 0221 by Ashley Mcpherson  Outcome: Progressing  4/29/2023 0220 by Ashley Mcpherson  Outcome: Progressing 98

## 2024-12-26 NOTE — PATIENT PROFILE ADULT - FALL HARM RISK - RISK INTERVENTIONS
Assistance OOB with selected safe patient handling equipment/Assistance with ambulation/Communicate Fall Risk and Risk Factors to all staff, patient, and family/Reinforce activity limits and safety measures with patient and family/Use of alarms - bed, chair and/or voice tab/Visual Cue: Yellow wristband/Bed in lowest position, wheels locked, appropriate side rails in place/Call bell, personal items and telephone in reach/Instruct patient to call for assistance before getting out of bed or chair/Non-slip footwear when patient is out of bed/[place] to call system/Physically safe environment - no spills, clutter or unnecessary equipment/Purposeful Proactive Rounding/Room/bathroom lighting operational, light cord in reach

## 2024-12-26 NOTE — ED PROVIDER NOTE - CLINICAL SUMMARY MEDICAL DECISION MAKING FREE TEXT BOX
Patient is a 69-year-old male past medical history of syncope hypertension A-fib EF in October 2020 for 30 to 35% presents for evaluation of sudden onset shortness of breath over the past 48 hours with hypoxia and increased respiratory rate denies any actual chest pain back pain abdominal pain fevers chills vomiting or diaphoresis    On physical exam patient has tachypnea increased work of breathing hypoxic on room air he is normocephalic atraumatic pupils equal round reactive light accommodation extraocular muscles intact oropharynx clear chest reveals evidence of diffuse wheezing increased work of breathing accessory use and retractions abdomen soft nontender nondistended bowel sounds positive extremities full range of motion no focal deficits noted pedal pulses 2+ radial pulses 2+    Assessment plan patient presents for evaluation of worsening shortness of breath most consistent with CHF exacerbation due to increased work of breathing we placed patient on BiPAP routine EKG per my independent evaluation not consistent with STEMI and Oxis with QT prolongation in edition maintain chest x-ray per my independent evaluation not consistent with pneumothorax patient found to have worsening bilateral opacities considering the clinical picture we administered IV antibiotics considering his history of CHF patient was not given a 30 cc/kg bolus as this is contraindicated we obtain blood cultures prior to administering antibiotics and obtain lactated and ordered repeat lactate given history physical I we have consulted ICU who evaluated patient and advises admission to sdu   symptoms most consistent with chf exacerbation at this time

## 2024-12-26 NOTE — H&P ADULT - ASSESSMENT
69yoM with a PMhx of chronic HFrEF, s/p ICD, AVR porcine , HTN, pAF s/p ablation, CAD s/p PCI, syncope, HLD, DM, COPD not on home o2, current smoker, whom presents to ed with c/o sudden onset of sob about 4am today at home described as constant, severe, associated with mild chest discomfort, and partially relieved with lasix 40mg ivp and bipap in ED. Pt denies associated fevers, chills, cough, edema, palpitations or dizziness.    #acute on chronic systolic CHF  -admit to step down  -lasix drip  -c/w bipap, and wean off as tolerated  -npo while on bipap  -cardiology consult  -trend troponin  -echo  -repeat ekg in am  -lipid panel in am  -daily, wt, I&O  -monitor lytes  -chg bath  -vte prophylaxis c/w eliquis  -c/w entresto    #Pafib  -c/w eliquis, amiodarone, toprol    #cad  -c/w eliquis, plavix, and lipitor    #DM  -insulin sliding scale  hold metformin in hospital  -resume insulin when on po diet    #COPD  #current smoker  -encouraged smoking cessation    #hyperlipidemia  -c/w tricor, zetia, lipitor    -above d/w Dr Cabral  69yoM with a PMhx of chronic HFrEF, s/p ICD, AVR porcine , HTN, pAF s/p ablation, CAD s/p PCI, syncope, HLD, DM, COPD not on home o2, current smoker, whom presents to ed with c/o sudden onset of sob about 4am today at home described as constant, severe, associated with mild chest discomfort, and partially relieved with lasix 40mg ivp and bipap in ED. Pt denies associated fevers, chills, cough, edema, palpitations or dizziness.    #acute on chronic systolic CHF  #elevated troponin due to above  -admit to step down  -lasix drip  -c/w bipap, and wean off as tolerated  -npo while on bipap  -cardiology consult  -trend troponin  -echo  -repeat ekg in am  -lipid panel in am  -daily, wt, I&O  -monitor lytes  -chg bath  -vte prophylaxis c/w eliquis  -c/w entresto    #Pafib  -c/w eliquis, amiodarone, toprol    #cad  -c/w eliquis, plavix, and lipitor    #DM  -insulin sliding scale  hold metformin in hospital  -resume insulin when on po diet    #COPD  #current smoker  -encouraged smoking cessation    #hyperlipidemia  -c/w tricor, zetia, lipitor    -above d/w Dr Cabral

## 2024-12-26 NOTE — ED ADULT NURSE NOTE - NSFALLHARMRISKINTERV_ED_ALL_ED
Communicate risk of Fall with Harm to all staff, patient, and family/Provide visual cue: red socks, yellow wristband, yellow gown, etc/Reinforce activity limits and safety measures with patient and family/Use of alarms - bed, stretcher, chair and/or video monitoring/Bed in lowest position, wheels locked, appropriate side rails in place/Call bell, personal items and telephone in reach/Instruct patient to call for assistance before getting out of bed/chair/stretcher/Non-slip footwear applied when patient is off stretcher/[place] to call system/Physically safe environment - no spills, clutter or unnecessary equipment/Purposeful Proactive Rounding/Room/bathroom lighting operational, light cord in reach

## 2024-12-26 NOTE — DISCHARGE NOTE NURSING/CASE MANAGEMENT/SOCIAL WORK - NSDCPEFALRISK_GEN_ALL_CORE
For information on Fall & Injury Prevention, visit: [email or web address] OR  [email or web address] OR  https://www.cdc.gov/steadi/patient.html

## 2024-12-26 NOTE — DISCHARGE NOTE PROVIDER - HOSPITAL COURSE
69yoM with a PMhx of chronic HFrEF, s/p ICD, AVR porcine , HTN, pAF s/p ablation, CAD s/p PCI, syncope, HLD, DM, COPD not on home o2, current smoker, whom presents to ed with c/o sudden onset of sob about 4am today at home described as constant, severe, associated with mild chest discomfort, and partially relieved with lasix 40mg ivp and bipap in ED. Pt denies associated fevers, chills, cough, edema, palpitations or dizziness.    #acute on chronic systolic CHF  #elevated troponin due to above  -admit to step down  -lasix drip  -c/w bipap, and wean off as tolerated  -npo while on bipap  -cardiology consult  -trend troponin  -echo  -repeat ekg in am  -lipid panel in am  -daily, wt, I&O  -monitor lytes  -chg bath  -vte prophylaxis c/w eliquis  -c/w entresto    #Pafib  -c/w eliquis, amiodarone, toprol    #cad  -c/w eliquis, plavix, and lipitor    #DM  -insulin sliding scale  hold metformin in hospital  -resume insulin when on po diet    #COPD  #current smoker  -encouraged smoking cessation    #hyperlipidemia  -c/w tricor, zetia, lipitor    PATIENT INSISTED ON LEAVING AGAINST MEDICAL ADVICE DESPITE EXTENSIVE EXPLANATION ON RISKS OF DOING SO INCLUDING DEATH.

## 2024-12-26 NOTE — ED PROVIDER NOTE - PHYSICAL EXAMINATION
VITAL SIGNS: I have reviewed nursing notes and confirm.  CONSTITUTIONAL: ill-appearing, non-toxic, in acute respiratory distress  SKIN: Warm dry, normal skin turgor, no acute rash, no bruising  HEAD: NCAT  EYES: EOMI, PERRLA, no scleral icterus, normal conjunctiva  ENT: Moist mucous membranes, OR clear. Normal pharynx  NECK: Supple; non tender. Full ROM. No cervical LAD  CARD: RRR, no murmurs, rubs or gallops  RESP: Diminished lung sounds at bilateral lung bases, increased WOB with abdominal retractions, nonproductive cough present.   ABD: soft, + BS, non-tender, non-distended, no rebound or guarding. No CVA tenderness  EXT: Full ROM, no bony tenderness, pulses intact in bilateral UE and LE, no pedal edema, no calf tenderness  NEURO: normal motor. normal sensory. Normal gait.  PSYCH: Cooperative, appropriate. AAOx3.

## 2024-12-26 NOTE — H&P ADULT - NSHPLABSRESULTS_GEN_ALL_CORE
15.7   15.20 )-----------( 364      ( 26 Dec 2024 10:20 )             48.9       12-26    136  |  101  |  18  ----------------------------<  193[H]  4.7   |  23  |  1.5    Ca    9.7      26 Dec 2024 10:20    TPro  6.7  /  Alb  4.2  /  TBili  0.4  /  DBili  x   /  AST  27  /  ALT  17  /  AlkPhos  69  12-26              Urinalysis Basic - ( 26 Dec 2024 10:20 )    Color: x / Appearance: x / SG: x / pH: x  Gluc: 193 mg/dL / Ketone: x  / Bili: x / Urobili: x   Blood: x / Protein: x / Nitrite: x   Leuk Esterase: x / RBC: x / WBC x   Sq Epi: x / Non Sq Epi: x / Bacteria: x      < from: Xray Chest 1 View-PORTABLE IMMEDIATE (12.26.24 @ 10:01) >      IMPRESSION:    New bilateral lung opacities

## 2024-12-27 ENCOUNTER — INPATIENT (INPATIENT)
Facility: HOSPITAL | Age: 69
LOS: 19 days | Discharge: HOME HEALTH | DRG: 273 | End: 2025-01-16
Attending: STUDENT IN AN ORGANIZED HEALTH CARE EDUCATION/TRAINING PROGRAM | Admitting: STUDENT IN AN ORGANIZED HEALTH CARE EDUCATION/TRAINING PROGRAM
Payer: MEDICARE

## 2024-12-27 ENCOUNTER — RESULT REVIEW (OUTPATIENT)
Age: 69
End: 2024-12-27

## 2024-12-27 VITALS
WEIGHT: 169.98 LBS | TEMPERATURE: 97 F | OXYGEN SATURATION: 96 % | HEART RATE: 99 BPM | HEIGHT: 63 IN | RESPIRATION RATE: 22 BRPM | DIASTOLIC BLOOD PRESSURE: 68 MMHG | SYSTOLIC BLOOD PRESSURE: 123 MMHG

## 2024-12-27 DIAGNOSIS — I50.89 OTHER HEART FAILURE: ICD-10-CM

## 2024-12-27 DIAGNOSIS — Z98.890 OTHER SPECIFIED POSTPROCEDURAL STATES: Chronic | ICD-10-CM

## 2024-12-27 DIAGNOSIS — Z95.2 PRESENCE OF PROSTHETIC HEART VALVE: Chronic | ICD-10-CM

## 2024-12-27 DIAGNOSIS — Z95.5 PRESENCE OF CORONARY ANGIOPLASTY IMPLANT AND GRAFT: Chronic | ICD-10-CM

## 2024-12-27 LAB
ALBUMIN SERPL ELPH-MCNC: 4 G/DL — SIGNIFICANT CHANGE UP (ref 3.5–5.2)
ALP SERPL-CCNC: 64 U/L — SIGNIFICANT CHANGE UP (ref 30–115)
ALT FLD-CCNC: 19 U/L — SIGNIFICANT CHANGE UP (ref 0–41)
ANION GAP SERPL CALC-SCNC: 13 MMOL/L — SIGNIFICANT CHANGE UP (ref 7–14)
AST SERPL-CCNC: 55 U/L — HIGH (ref 0–41)
BASE EXCESS BLDV CALC-SCNC: 2 MMOL/L — SIGNIFICANT CHANGE UP (ref -2–3)
BASOPHILS # BLD AUTO: 0.09 K/UL — SIGNIFICANT CHANGE UP (ref 0–0.2)
BASOPHILS NFR BLD AUTO: 0.6 % — SIGNIFICANT CHANGE UP (ref 0–1)
BILIRUB SERPL-MCNC: 0.7 MG/DL — SIGNIFICANT CHANGE UP (ref 0.2–1.2)
BUN SERPL-MCNC: 18 MG/DL — SIGNIFICANT CHANGE UP (ref 10–20)
CA-I SERPL-SCNC: 1.24 MMOL/L — SIGNIFICANT CHANGE UP (ref 1.15–1.33)
CALCIUM SERPL-MCNC: 9.6 MG/DL — SIGNIFICANT CHANGE UP (ref 8.4–10.5)
CHLORIDE SERPL-SCNC: 104 MMOL/L — SIGNIFICANT CHANGE UP (ref 98–110)
CO2 SERPL-SCNC: 23 MMOL/L — SIGNIFICANT CHANGE UP (ref 17–32)
CREAT SERPL-MCNC: 1.4 MG/DL — SIGNIFICANT CHANGE UP (ref 0.7–1.5)
EGFR: 54 ML/MIN/1.73M2 — LOW
EOSINOPHIL # BLD AUTO: 0.27 K/UL — SIGNIFICANT CHANGE UP (ref 0–0.7)
EOSINOPHIL NFR BLD AUTO: 1.7 % — SIGNIFICANT CHANGE UP (ref 0–8)
FLUAV AG NPH QL: SIGNIFICANT CHANGE UP
FLUBV AG NPH QL: SIGNIFICANT CHANGE UP
GAS PNL BLDV: 135 MMOL/L — LOW (ref 136–145)
GAS PNL BLDV: SIGNIFICANT CHANGE UP
GAS PNL BLDV: SIGNIFICANT CHANGE UP
GLUCOSE BLDC GLUCOMTR-MCNC: 107 MG/DL — HIGH (ref 70–99)
GLUCOSE BLDC GLUCOMTR-MCNC: 65 MG/DL — LOW (ref 70–99)
GLUCOSE SERPL-MCNC: 88 MG/DL — SIGNIFICANT CHANGE UP (ref 70–99)
HCO3 BLDV-SCNC: 29 MMOL/L — SIGNIFICANT CHANGE UP (ref 22–29)
HCT VFR BLD CALC: 43.3 % — SIGNIFICANT CHANGE UP (ref 42–52)
HCT VFR BLDA CALC: 47 % — SIGNIFICANT CHANGE UP (ref 39–51)
HGB BLD CALC-MCNC: 15.5 G/DL — SIGNIFICANT CHANGE UP (ref 12.6–17.4)
HGB BLD-MCNC: 14.3 G/DL — SIGNIFICANT CHANGE UP (ref 14–18)
IMM GRANULOCYTES NFR BLD AUTO: 0.5 % — HIGH (ref 0.1–0.3)
LACTATE BLDV-MCNC: 2.1 MMOL/L — HIGH (ref 0.5–2)
LYMPHOCYTES # BLD AUTO: 1.95 K/UL — SIGNIFICANT CHANGE UP (ref 1.2–3.4)
LYMPHOCYTES # BLD AUTO: 12.3 % — LOW (ref 20.5–51.1)
MCHC RBC-ENTMCNC: 29.4 PG — SIGNIFICANT CHANGE UP (ref 27–31)
MCHC RBC-ENTMCNC: 33 G/DL — SIGNIFICANT CHANGE UP (ref 32–37)
MCV RBC AUTO: 89.1 FL — SIGNIFICANT CHANGE UP (ref 80–94)
MONOCYTES # BLD AUTO: 1.19 K/UL — HIGH (ref 0.1–0.6)
MONOCYTES NFR BLD AUTO: 7.5 % — SIGNIFICANT CHANGE UP (ref 1.7–9.3)
NEUTROPHILS # BLD AUTO: 12.32 K/UL — HIGH (ref 1.4–6.5)
NEUTROPHILS NFR BLD AUTO: 77.4 % — HIGH (ref 42.2–75.2)
NRBC # BLD: 0 /100 WBCS — SIGNIFICANT CHANGE UP (ref 0–0)
NT-PROBNP SERPL-SCNC: 5279 PG/ML — HIGH (ref 0–300)
PCO2 BLDV: 54 MMHG — SIGNIFICANT CHANGE UP (ref 42–55)
PH BLDV: 7.34 — SIGNIFICANT CHANGE UP (ref 7.32–7.43)
PLATELET # BLD AUTO: 338 K/UL — SIGNIFICANT CHANGE UP (ref 130–400)
PMV BLD: 10.7 FL — HIGH (ref 7.4–10.4)
PO2 BLDV: 19 MMHG — LOW (ref 25–45)
POTASSIUM BLDV-SCNC: 4.3 MMOL/L — SIGNIFICANT CHANGE UP (ref 3.5–5.1)
POTASSIUM SERPL-MCNC: 5.6 MMOL/L — HIGH (ref 3.5–5)
POTASSIUM SERPL-SCNC: 5.6 MMOL/L — HIGH (ref 3.5–5)
PROT SERPL-MCNC: 6.4 G/DL — SIGNIFICANT CHANGE UP (ref 6–8)
RBC # BLD: 4.86 M/UL — SIGNIFICANT CHANGE UP (ref 4.7–6.1)
RBC # FLD: 13.8 % — SIGNIFICANT CHANGE UP (ref 11.5–14.5)
RSV RNA NPH QL NAA+NON-PROBE: SIGNIFICANT CHANGE UP
SAO2 % BLDV: 20.8 % — LOW (ref 67–88)
SARS-COV-2 RNA SPEC QL NAA+PROBE: SIGNIFICANT CHANGE UP
SODIUM SERPL-SCNC: 140 MMOL/L — SIGNIFICANT CHANGE UP (ref 135–146)
TROPONIN T, HIGH SENSITIVITY RESULT: 49 NG/L — HIGH (ref 6–21)
TROPONIN T, HIGH SENSITIVITY RESULT: 52 NG/L — CRITICAL HIGH (ref 6–21)
TROPONIN T, HIGH SENSITIVITY RESULT: 56 NG/L — CRITICAL HIGH (ref 6–21)
WBC # BLD: 15.9 K/UL — HIGH (ref 4.8–10.8)
WBC # FLD AUTO: 15.9 K/UL — HIGH (ref 4.8–10.8)

## 2024-12-27 PROCEDURE — 85018 HEMOGLOBIN: CPT

## 2024-12-27 PROCEDURE — 93306 TTE W/DOPPLER COMPLETE: CPT | Mod: 26

## 2024-12-27 PROCEDURE — 84484 ASSAY OF TROPONIN QUANT: CPT

## 2024-12-27 PROCEDURE — 74176 CT ABD & PELVIS W/O CONTRAST: CPT | Mod: MC

## 2024-12-27 PROCEDURE — C1766: CPT

## 2024-12-27 PROCEDURE — 36415 COLL VENOUS BLD VENIPUNCTURE: CPT

## 2024-12-27 PROCEDURE — 86900 BLOOD TYPING SEROLOGIC ABO: CPT

## 2024-12-27 PROCEDURE — 93656 COMPRE EP EVAL ABLTJ ATR FIB: CPT

## 2024-12-27 PROCEDURE — 84132 ASSAY OF SERUM POTASSIUM: CPT

## 2024-12-27 PROCEDURE — 92610 EVALUATE SWALLOWING FUNCTION: CPT | Mod: GN

## 2024-12-27 PROCEDURE — 85610 PROTHROMBIN TIME: CPT

## 2024-12-27 PROCEDURE — C1894: CPT

## 2024-12-27 PROCEDURE — C1893: CPT

## 2024-12-27 PROCEDURE — 97110 THERAPEUTIC EXERCISES: CPT | Mod: GP

## 2024-12-27 PROCEDURE — 87640 STAPH A DNA AMP PROBE: CPT

## 2024-12-27 PROCEDURE — 84145 PROCALCITONIN (PCT): CPT

## 2024-12-27 PROCEDURE — C1760: CPT

## 2024-12-27 PROCEDURE — 82330 ASSAY OF CALCIUM: CPT

## 2024-12-27 PROCEDURE — 83880 ASSAY OF NATRIURETIC PEPTIDE: CPT

## 2024-12-27 PROCEDURE — 80076 HEPATIC FUNCTION PANEL: CPT

## 2024-12-27 PROCEDURE — 94640 AIRWAY INHALATION TREATMENT: CPT

## 2024-12-27 PROCEDURE — 74018 RADEX ABDOMEN 1 VIEW: CPT

## 2024-12-27 PROCEDURE — 82803 BLOOD GASES ANY COMBINATION: CPT

## 2024-12-27 PROCEDURE — 94660 CPAP INITIATION&MGMT: CPT

## 2024-12-27 PROCEDURE — 76705 ECHO EXAM OF ABDOMEN: CPT

## 2024-12-27 PROCEDURE — 97165 OT EVAL LOW COMPLEX 30 MIN: CPT | Mod: GO

## 2024-12-27 PROCEDURE — 85027 COMPLETE CBC AUTOMATED: CPT

## 2024-12-27 PROCEDURE — 70450 CT HEAD/BRAIN W/O DYE: CPT | Mod: MC

## 2024-12-27 PROCEDURE — 93306 TTE W/DOPPLER COMPLETE: CPT

## 2024-12-27 PROCEDURE — 87389 HIV-1 AG W/HIV-1&-2 AB AG IA: CPT

## 2024-12-27 PROCEDURE — 92526 ORAL FUNCTION THERAPY: CPT | Mod: GN

## 2024-12-27 PROCEDURE — 80048 BASIC METABOLIC PNL TOTAL CA: CPT

## 2024-12-27 PROCEDURE — 93005 ELECTROCARDIOGRAM TRACING: CPT

## 2024-12-27 PROCEDURE — 71250 CT THORAX DX C-: CPT | Mod: MC

## 2024-12-27 PROCEDURE — C1732: CPT

## 2024-12-27 PROCEDURE — 86901 BLOOD TYPING SEROLOGIC RH(D): CPT

## 2024-12-27 PROCEDURE — 80074 ACUTE HEPATITIS PANEL: CPT

## 2024-12-27 PROCEDURE — 82962 GLUCOSE BLOOD TEST: CPT

## 2024-12-27 PROCEDURE — 84295 ASSAY OF SERUM SODIUM: CPT

## 2024-12-27 PROCEDURE — 87040 BLOOD CULTURE FOR BACTERIA: CPT

## 2024-12-27 PROCEDURE — 94760 N-INVAS EAR/PLS OXIMETRY 1: CPT

## 2024-12-27 PROCEDURE — C1733: CPT

## 2024-12-27 PROCEDURE — 87641 MR-STAPH DNA AMP PROBE: CPT

## 2024-12-27 PROCEDURE — 94002 VENT MGMT INPAT INIT DAY: CPT

## 2024-12-27 PROCEDURE — 97163 PT EVAL HIGH COMPLEX 45 MIN: CPT | Mod: GP

## 2024-12-27 PROCEDURE — 80053 COMPREHEN METABOLIC PANEL: CPT

## 2024-12-27 PROCEDURE — C1769: CPT

## 2024-12-27 PROCEDURE — 93318 ECHO TRANSESOPHAGEAL INTRAOP: CPT

## 2024-12-27 PROCEDURE — 99285 EMERGENCY DEPT VISIT HI MDM: CPT

## 2024-12-27 PROCEDURE — 84100 ASSAY OF PHOSPHORUS: CPT

## 2024-12-27 PROCEDURE — 74019 RADEX ABDOMEN 2 VIEWS: CPT

## 2024-12-27 PROCEDURE — 97530 THERAPEUTIC ACTIVITIES: CPT | Mod: GP

## 2024-12-27 PROCEDURE — 71045 X-RAY EXAM CHEST 1 VIEW: CPT

## 2024-12-27 PROCEDURE — C9399: CPT

## 2024-12-27 PROCEDURE — 85730 THROMBOPLASTIN TIME PARTIAL: CPT

## 2024-12-27 PROCEDURE — 85025 COMPLETE CBC W/AUTO DIFF WBC: CPT

## 2024-12-27 PROCEDURE — 85014 HEMATOCRIT: CPT

## 2024-12-27 PROCEDURE — 86850 RBC ANTIBODY SCREEN: CPT

## 2024-12-27 PROCEDURE — 83036 HEMOGLOBIN GLYCOSYLATED A1C: CPT

## 2024-12-27 PROCEDURE — C1730: CPT

## 2024-12-27 PROCEDURE — 93657 TX L/R ATRIAL FIB ADDL: CPT

## 2024-12-27 PROCEDURE — 93289 INTERROG DEVICE EVAL HEART: CPT

## 2024-12-27 PROCEDURE — 83735 ASSAY OF MAGNESIUM: CPT

## 2024-12-27 PROCEDURE — 71045 X-RAY EXAM CHEST 1 VIEW: CPT | Mod: 26

## 2024-12-27 PROCEDURE — 93010 ELECTROCARDIOGRAM REPORT: CPT

## 2024-12-27 PROCEDURE — 0225U NFCT DS DNA&RNA 21 SARSCOV2: CPT

## 2024-12-27 PROCEDURE — 74019 RADEX ABDOMEN 2 VIEWS: CPT | Mod: 26

## 2024-12-27 PROCEDURE — 97116 GAIT TRAINING THERAPY: CPT | Mod: GP

## 2024-12-27 PROCEDURE — C1759: CPT

## 2024-12-27 PROCEDURE — 97162 PT EVAL MOD COMPLEX 30 MIN: CPT | Mod: GP

## 2024-12-27 PROCEDURE — 94003 VENT MGMT INPAT SUBQ DAY: CPT

## 2024-12-27 PROCEDURE — 0241U: CPT

## 2024-12-27 PROCEDURE — 83605 ASSAY OF LACTIC ACID: CPT

## 2024-12-27 PROCEDURE — 82140 ASSAY OF AMMONIA: CPT

## 2024-12-27 RX ORDER — ATORVASTATIN CALCIUM 40 MG/1
80 TABLET, FILM COATED ORAL AT BEDTIME
Refills: 0 | Status: DISCONTINUED | OUTPATIENT
Start: 2024-12-27 | End: 2024-12-27

## 2024-12-27 RX ORDER — FUROSEMIDE 20 MG
40 TABLET ORAL DAILY
Refills: 0 | Status: DISCONTINUED | OUTPATIENT
Start: 2024-12-27 | End: 2024-12-28

## 2024-12-27 RX ORDER — ONDANSETRON 4 MG/1
4 TABLET ORAL EVERY 8 HOURS
Refills: 0 | Status: DISCONTINUED | OUTPATIENT
Start: 2024-12-27 | End: 2025-01-10

## 2024-12-27 RX ORDER — METOPROLOL TARTRATE 50 MG
200 TABLET ORAL DAILY
Refills: 0 | Status: DISCONTINUED | OUTPATIENT
Start: 2024-12-27 | End: 2024-12-30

## 2024-12-27 RX ORDER — CLOPIDOGREL BISULFATE 75 MG/1
75 TABLET, FILM COATED ORAL DAILY
Refills: 0 | Status: DISCONTINUED | OUTPATIENT
Start: 2024-12-27 | End: 2025-01-16

## 2024-12-27 RX ORDER — DEXTROSE MONOHYDRATE 25 G/50ML
15 INJECTION, SOLUTION INTRAVENOUS ONCE
Refills: 0 | Status: DISCONTINUED | OUTPATIENT
Start: 2024-12-27 | End: 2025-01-16

## 2024-12-27 RX ORDER — GINKGO BILOBA 40 MG
3 CAPSULE ORAL AT BEDTIME
Refills: 0 | Status: DISCONTINUED | OUTPATIENT
Start: 2024-12-27 | End: 2025-01-16

## 2024-12-27 RX ORDER — AZITHROMYCIN MONOHYDRATE 200 MG/5ML
POWDER, FOR SUSPENSION ORAL
Refills: 0 | Status: DISCONTINUED | OUTPATIENT
Start: 2024-12-27 | End: 2024-12-31

## 2024-12-27 RX ORDER — FENOFIBRATE 48 MG/1
145 TABLET ORAL DAILY
Refills: 0 | Status: DISCONTINUED | OUTPATIENT
Start: 2024-12-27 | End: 2025-01-16

## 2024-12-27 RX ORDER — CHLORHEXIDINE GLUCONATE 1.2 MG/ML
1 RINSE ORAL
Refills: 0 | Status: DISCONTINUED | OUTPATIENT
Start: 2024-12-27 | End: 2025-01-16

## 2024-12-27 RX ORDER — INSULIN LISPRO 100/ML
VIAL (ML) SUBCUTANEOUS
Refills: 0 | Status: DISCONTINUED | OUTPATIENT
Start: 2024-12-27 | End: 2025-01-16

## 2024-12-27 RX ORDER — APIXABAN 5 MG/1
5 TABLET, FILM COATED ORAL EVERY 12 HOURS
Refills: 0 | Status: DISCONTINUED | OUTPATIENT
Start: 2024-12-27 | End: 2024-12-31

## 2024-12-27 RX ORDER — AMIODARONE HYDROCHLORIDE 200 MG/1
200 TABLET ORAL DAILY
Refills: 0 | Status: DISCONTINUED | OUTPATIENT
Start: 2024-12-27 | End: 2025-01-13

## 2024-12-27 RX ORDER — ACETAMINOPHEN 80 MG/.8ML
650 SOLUTION/ DROPS ORAL EVERY 6 HOURS
Refills: 0 | Status: DISCONTINUED | OUTPATIENT
Start: 2024-12-27 | End: 2025-01-02

## 2024-12-27 RX ORDER — SACUBITRIL AND VALSARTAN 24; 26 MG/1; MG/1
1 TABLET, FILM COATED ORAL
Refills: 0 | Status: DISCONTINUED | OUTPATIENT
Start: 2024-12-27 | End: 2024-12-27

## 2024-12-27 RX ORDER — AZITHROMYCIN MONOHYDRATE 200 MG/5ML
500 POWDER, FOR SUSPENSION ORAL ONCE
Refills: 0 | Status: COMPLETED | OUTPATIENT
Start: 2024-12-27 | End: 2024-12-27

## 2024-12-27 RX ORDER — EZETIMIBE 10 MG/1
10 TABLET ORAL DAILY
Refills: 0 | Status: DISCONTINUED | OUTPATIENT
Start: 2024-12-27 | End: 2025-01-16

## 2024-12-27 RX ORDER — MAG HYDROX/ALUMINUM HYD/SIMETH 200-200-20
30 SUSPENSION, ORAL (FINAL DOSE FORM) ORAL EVERY 4 HOURS
Refills: 0 | Status: DISCONTINUED | OUTPATIENT
Start: 2024-12-27 | End: 2025-01-10

## 2024-12-27 RX ORDER — AZITHROMYCIN MONOHYDRATE 200 MG/5ML
500 POWDER, FOR SUSPENSION ORAL EVERY 24 HOURS
Refills: 0 | Status: DISCONTINUED | OUTPATIENT
Start: 2024-12-28 | End: 2024-12-31

## 2024-12-27 RX ORDER — INSULIN ASPART 100 [IU]/ML
8 INJECTION, SOLUTION INTRAVENOUS; SUBCUTANEOUS
Refills: 0 | DISCHARGE

## 2024-12-27 RX ORDER — CEFTRIAXONE SODIUM 1 G/1
1000 INJECTION, POWDER, FOR SOLUTION INTRAMUSCULAR; INTRAVENOUS EVERY 24 HOURS
Refills: 0 | Status: DISCONTINUED | OUTPATIENT
Start: 2024-12-27 | End: 2024-12-31

## 2024-12-27 RX ORDER — ATORVASTATIN CALCIUM 40 MG/1
1 TABLET, FILM COATED ORAL
Refills: 0 | DISCHARGE

## 2024-12-27 RX ORDER — ZOLPIDEM TARTRATE 5 MG
5 TABLET ORAL AT BEDTIME
Refills: 0 | Status: DISCONTINUED | OUTPATIENT
Start: 2024-12-27 | End: 2025-01-03

## 2024-12-27 RX ORDER — ATORVASTATIN CALCIUM 40 MG/1
40 TABLET, FILM COATED ORAL AT BEDTIME
Refills: 0 | Status: DISCONTINUED | OUTPATIENT
Start: 2024-12-27 | End: 2025-01-03

## 2024-12-27 RX ORDER — SODIUM CHLORIDE 9 MG/ML
1000 INJECTION, SOLUTION INTRAVENOUS
Refills: 0 | Status: DISCONTINUED | OUTPATIENT
Start: 2024-12-27 | End: 2025-01-16

## 2024-12-27 RX ADMIN — ATORVASTATIN CALCIUM 40 MILLIGRAM(S): 40 TABLET, FILM COATED ORAL at 22:07

## 2024-12-27 RX ADMIN — EZETIMIBE 10 MILLIGRAM(S): 10 TABLET ORAL at 11:40

## 2024-12-27 RX ADMIN — Medication 200 MILLIGRAM(S): at 12:58

## 2024-12-27 RX ADMIN — CLOPIDOGREL BISULFATE 75 MILLIGRAM(S): 75 TABLET, FILM COATED ORAL at 11:40

## 2024-12-27 RX ADMIN — FENOFIBRATE 145 MILLIGRAM(S): 48 TABLET ORAL at 11:40

## 2024-12-27 RX ADMIN — APIXABAN 5 MILLIGRAM(S): 5 TABLET, FILM COATED ORAL at 18:00

## 2024-12-27 RX ADMIN — AZITHROMYCIN MONOHYDRATE 500 MILLIGRAM(S): 200 POWDER, FOR SUSPENSION ORAL at 12:44

## 2024-12-27 RX ADMIN — AMIODARONE HYDROCHLORIDE 200 MILLIGRAM(S): 200 TABLET ORAL at 12:58

## 2024-12-27 RX ADMIN — CEFTRIAXONE SODIUM 100 MILLIGRAM(S): 1 INJECTION, POWDER, FOR SOLUTION INTRAMUSCULAR; INTRAVENOUS at 12:44

## 2024-12-27 RX ADMIN — Medication 40 MILLIGRAM(S): at 12:44

## 2024-12-27 NOTE — CONSULT NOTE ADULT - SUBJECTIVE AND OBJECTIVE BOX
HPI:  This is a 69-year-old male with past medical history of chronic HFrEF s/p ICD, AVR porcine, HTN, PAF s/p ablation, CAD s/p PCI, DM, HLD, COPD (no home O2), smoker presenting with worsening shortness of breath and weakness.  Patient was admitted within the last 24 hours for acute on chronic CHF exacerbation, was given IV Lasix and BiPAP, and left AMA.  Patient returns stating that he became more short of breath last night after leaving the hospital and would like to be readmitted for treatment.  Denies fever/chills, chest pain, abdominal pain, calf pain, N/V/D/C, cough, and nasal congestion.      (27 Dec 2024 10:46)        HPI-Cardiology   Pt with the above Hx and HPI, evaluated at bedside. Pt presented for eval of worsening SOB. Patient was admitted within the last 24 hours for acute on chronic CHF exacerbation, was given IV Lasix and BiPAP, and left AMA. Returns stating that he became more short of breath last night after leaving the hospital and would like to be readmitted for treatment. Radiology tests and hospital records, were reviewed, as well as previous notes on this patient.      PAST MEDICAL & SURGICAL HISTORY  CHF (congestive heart failure)    Diabetes    CAD (coronary artery disease)    Chronic obstructive pulmonary disease (COPD)    HTN (hypertension)    Stented coronary artery    Dyslipidemia    GERD (gastroesophageal reflux disease)    Afib    CVA (cerebral vascular accident)    H/O heart artery stent    Heart valve replaced  aortic    History of Nissen fundoplication        ALLERGIES:  sulfa drugs (Unknown)      MEDICATIONS:  MEDICATIONS  (STANDING):  aMIOdarone    Tablet 200 milliGRAM(s) Oral daily  apixaban 5 milliGRAM(s) Oral every 12 hours  atorvastatin 40 milliGRAM(s) Oral at bedtime  azithromycin  IVPB      cefTRIAXone   IVPB 1000 milliGRAM(s) IV Intermittent every 24 hours  chlorhexidine 2% Cloths 1 Application(s) Topical <User Schedule>  clopidogrel Tablet 75 milliGRAM(s) Oral daily  dextrose 5%. 1000 milliLiter(s) (50 mL/Hr) IV Continuous <Continuous>  ezetimibe 10 milliGRAM(s) Oral daily  fenofibrate Tablet 145 milliGRAM(s) Oral daily  furosemide   Injectable 40 milliGRAM(s) IV Push daily  insulin lispro (ADMELOG) corrective regimen sliding scale   SubCutaneous three times a day before meals  metoprolol succinate  milliGRAM(s) Oral daily    MEDICATIONS  (PRN):  acetaminophen     Tablet .. 650 milliGRAM(s) Oral every 6 hours PRN Temp greater or equal to 38C (100.4F), Mild Pain (1 - 3)  aluminum hydroxide/magnesium hydroxide/simethicone Suspension 30 milliLiter(s) Oral every 4 hours PRN Dyspepsia  dextrose Oral Gel 15 Gram(s) Oral once PRN Blood Glucose LESS THAN 70 milliGRAM(s)/deciliter  melatonin 3 milliGRAM(s) Oral at bedtime PRN Insomnia  ondansetron Injectable 4 milliGRAM(s) IV Push every 8 hours PRN Nausea and/or Vomiting  zolpidem 5 milliGRAM(s) Oral at bedtime PRN Insomnia      HOME MEDICATIONS:  Home Medications:  amiodarone 200 mg oral tablet: 1 tab(s) orally once a day (27 Dec 2024 11:06)  apixaban 5 mg oral tablet: 1 tab(s) orally every 12 hours (26 Dec 2024 15:14)  atorvastatin 40 mg oral tablet: 1 tab(s) orally once a day (27 Dec 2024 11:11)  clopidogrel 75 mg oral tablet: 1 tab(s) orally once a day (26 Dec 2024 15:14)  Entresto 24 mg-26 mg oral tablet: 1 tab(s) orally 2 times a day (26 Dec 2024 15:14)  eszopiclone 3 mg oral tablet: 1 tab(s) orally once a day (at bedtime) (26 Dec 2024 15:14)  ezetimibe 10 mg oral tablet: 1 orally once a day (26 Dec 2024 15:14)  fenofibrate 145 mg oral tablet: 1 orally once a day (26 Dec 2024 15:14)  furosemide 40 mg oral tablet: 1 orally once a day (26 Dec 2024 15:14)  metFORMIN 500 mg oral tablet: 1 tab(s) orally 2 times a day (26 Dec 2024 15:14)  metoprolol succinate 200 mg oral capsule, extended release: 1 cap(s) orally once a day (27 Dec 2024 11:07)  Trulicity Pen 1.5 mg/0.5 mL subcutaneous solution: 1.5 milligram(s) subcutaneously once a week (26 Dec 2024 15:14)      VITALS:   T(F): 96.7 (12-27 @ 06:21), Max: 98.7 (12-26 @ 16:26)  HR: 99 (12-27 @ 06:21) (75 - 99)  BP: 123/68 (12-27 @ 06:21) (123/68 - 156/92)  BP(mean): 108 (12-26 @ 16:26) (108 - 108)  RR: 21 (12-27 @ 06:56) (21 - 33)  SpO2: 98% (12-27 @ 06:56) (95% - 100%)    I&O's Summary      REVIEW OF SYSTEMS:  See HPI    PHYSICAL EXAM:  NEURO: patient is awake , alert and oriented  GEN: Not in acute distress  NECK: no thyroid enlargement, no JVD  LUNGS: Crackles to auscultation bilaterally   CARDIOVASCULAR: S1/S2 present, RRR , no murmurs or rubs, no carotid bruits,  + PP bilaterally  ABD: Soft, non-tender, non-distended, +BS  EXT: No JUAN  SKIN: Intact    LABS:                        14.3   15.90 )-----------( 338      ( 27 Dec 2024 08:35 )             43.3     12-27    140  |  104  |  18  ----------------------------<  88  5.6[H]   |  23  |  1.4    Ca    9.6      27 Dec 2024 06:32    TPro  6.4  /  Alb  4.0  /  TBili  0.7  /  DBili  x   /  AST  55[H]  /  ALT  19  /  AlkPhos  64  12-27          RADIOLOGY:  -CXR:  < from: Xray Chest 1 View- PORTABLE-Urgent (12.27.24 @ 07:31) >    IMPRESSION:    Unchanged bilateral lung opacities    --- End of Report ---        < end of copied text >          < from: Transesophageal Echocardiogram (10.15.24 @ 12:18) >    Summary:   1. Moderately decreased global left ventricular systolic function. Left   ventricular ejection fraction, by visual estimation, is 30 to 35%.   2. Mildly enlarged right ventricle. Moderately reduced RV systolic   function.   3. Moderate to severe left atrial enlargement.   4. Bioprosthetic aortic valve well seated with adequate expanstion.   Normal opening. No paravalvular leak.   5. Mild mitral valve regurgitation.   6. Mild tricuspid regurgitation.   7. No left atrial appendage thrombus and decreased left atrial appendage   velocities.   8. After probe removal patient underwent successful synchronized   cardioversion with 200J.    < end of copied text >        ECG:  < from: 12 Lead ECG (12.27.24 @ 06:18) >   Ventricular-paced rhythm  Biventricular pacemaker detected  Abnormal ECG    Confirmed by RITA FRANCO MD ([id number]) on 12/27/2024 6:59:08 AM    < end of copied text >

## 2024-12-27 NOTE — ED PROVIDER NOTE - CLINICAL SUMMARY MEDICAL DECISION MAKING FREE TEXT BOX
Patient is a 69-year-old male presents here for worsening shortness of breath patient was seen and admitted to the ICU and was requiring BiPAP for exacerbation of CHF 1 day prior patient signed out AGAINST MEDICAL ADVICE presents here in better condition still hypoxic off room air patient will need admission considering past history most likely secondary to worsening CHF we obtained EKG per my independent evaluation not consistent with STEMI not consistent with QT prolongation not consistent with arrhythmia patient found to have elevated white blood cell count LFTs and electrolytes within normal limits x-ray per my independent evaluation not consistent with considering her condition improved however patient remains hypoxic

## 2024-12-27 NOTE — PATIENT PROFILE ADULT - FALL HARM RISK - HARM RISK INTERVENTIONS

## 2024-12-27 NOTE — H&P ADULT - NSHPLABSRESULTS_GEN_ALL_CORE
14.3   15.90 )-----------( 338      ( 27 Dec 2024 08:35 )             43.3       12-27    140  |  104  |  18  ----------------------------<  88  5.6[H]   |  23  |  1.4    Ca    9.6      27 Dec 2024 06:32  TPro  6.4  /  Alb  4.0  /  TBili  0.7  /  DBili  x   /  AST  55[H]  /  ALT  19  /  AlkPhos  64  12-27        Urinalysis Basic - ( 27 Dec 2024 06:32 )  Color: x / Appearance: x / SG: x / pH: x  Gluc: 88 mg/dL / Ketone: x  / Bili: x / Urobili: x   Blood: x / Protein: x / Nitrite: x   Leuk Esterase: x / RBC: x / WBC x   Sq Epi: x / Non Sq Epi: x / Bacteria: x    Lactate Trend      CAPILLARY BLOOD GLUCOSE  POCT Blood Glucose.: 107 mg/dL (27 Dec 2024 09:57)        ACC: [id number] EXAM:  XR CHEST PORTABLE URGENT 1V   ORDERED BY: JESE GUILLEN     PROCEDURE DATE:  12/27/2024      INTERPRETATION:  CLINICAL HISTORY: Shortness of breath    COMPARISON: 12/26/2024.    TECHNIQUE: Frontal    FINDINGS:    Support devices: Left-sided cardiac pacing device/ICD    Cardiac/mediastinum/hilum: Stable.    Lung parenchyma/Pleura: Unchanged bilateral lung opacities. No   pneumothorax.    Skeleton/soft tissues: Stable.      IMPRESSION:  Unchanged bilateral lung opacities    --- End of Report ---

## 2024-12-27 NOTE — PATIENT PROFILE ADULT - NSPROPTRIGHTCAREGIVER_GEN_A_NUR
Dr Lo Lundberg    I spoke to the patient's wife, /name verified. She is very appreciative of everything. I have relayed your message from 2023, Dr Germania Alston has not called her yet. She reported that the patient tries to eat 2 meals per day. He does not c/o discomfort while eating breakfast but feels \"tremendous\" mid abdominal pain during lunch. She stated his discomfort keeps him awake, she could not describe further. Denies vomiting. The patient had labs drawn today that were ordered by Dr Hailey Trejo. She understands he needs to be evaluated in ED if his pain gets severe and if he cannot tolerate food.      Thank you no

## 2024-12-27 NOTE — H&P ADULT - NS PANP COMMENT GEN_ALL_CORE FT
Please monitor closely if becomes in respiratory distress may need BIPAP and upgrade   vss  labs reviewed   dc antibiotics if no signs or symptoms of infection and procalcitonin negative   needs aggressive diuresis with close monitoring of electrolytes   FU cards recs   dw team in details

## 2024-12-27 NOTE — ED PROVIDER NOTE - ATTENDING APP SHARED VISIT CONTRIBUTION OF CARE
69-year-old male above past medical history returns to the emergency room after leaving AGAINST MEDICAL ADVICE within the last 24 hours complaining of return of his shortness of breath as well as generalized weakness, no chest pain, on exam vitals appreciated, chronically ill but nontoxic-appearing, comfortable at rest but dyspneic with minimal exertion with an oxygen saturation in the low 90s, head NC/AT, PERRLA, conjunctive pink, neck supple, cor irregular, lungs with diminished breath sounds at bases bilaterally with faint crackles at the left base no wheeze, abdomen soft nontender, 1+ bilateral lower extremity edema, calves nontender, pulses equal, neurologically is nonfocal, will repeat labs and imaging, supplemental oxygen, reassess, does not require and NIPPV at this time 69-year-old male above past medical history returns to the emergency room after leaving AGAINST MEDICAL ADVICE within the last 24 hours complaining of return of his shortness of breath as well as generalized weakness, no chest pain, on exam vitals appreciated, chronically ill but nontoxic-appearing, comfortable at rest but dyspneic with minimal exertion with an oxygen saturation in the low 90s, head NC/AT, PERRLA, conjunctive pink, neck supple, cor reg, lungs with diminished breath sounds at bases bilaterally with faint crackles at the left base no wheeze, abdomen soft nontender, 1+ bilateral lower extremity edema, calves nontender, pulses equal, neurologically is nonfocal, will repeat labs and imaging, supplemental oxygen, reassess, does not require and NIPPV at this time

## 2024-12-27 NOTE — CONSULT NOTE ADULT - ASSESSMENT
69-year-old male with past medical history of chronic HFrEF s/p ICD, AVR porcine, HTN, PAF s/p ablation, CAD s/p PCI, DM, HLD, COPD (no home O2), smoker presenting with worsening shortness of breath      Impression:  # Mildly decompensated sysytolic CHF likely 2/2 to non compliance with low Na diet  #ICM s/p ICD  #HFrEF  #AVR porcine  #Hx of PAF s/p ablation   #HTN, HLD, DM      Pt currently endorses some SOB but denies any CP  pBNP 5279  Cxr: Unchanged bilateral lung opacities  Clinically appears volume overloaded  KUSH 10/15/24: EF 30-35%, Moderately reduced RV systolic function, Moderately decreased global LVSF, Moderate to severe left atrial enlargement      Plan:  Would increase Lasix to 40mg IVP BID. When more euvolemic transition to home PO dose  Strict I&O's, daily weights, keep negative balance  Repeat pBNP closer to discharge  Cont w/ rest of cardiac home meds  Monitor lytes  F/u Dr. Card outpatient

## 2024-12-27 NOTE — H&P ADULT - IN ACCORDANCE WITH [place] STATE LAW, WE OFFER EVERY PATIENT A HEPATITIS C TEST. WOULD YOU LIKE TO BE TESTED TODAY?
Opt out Complex Repair And V-Y Plasty Text: The defect edges were debeveled with a #15 scalpel blade.  The primary defect was closed partially with a complex linear closure.  Given the location of the remaining defect, shape of the defect and the proximity to free margins a V-Y plasty was deemed most appropriate for complete closure of the defect.  Using a sterile surgical marker, an appropriate advancement flap was drawn incorporating the defect and placing the expected incisions within the relaxed skin tension lines where possible.    The area thus outlined was incised deep to adipose tissue with a #15 scalpel blade.  The skin margins were undermined to an appropriate distance in all directions utilizing iris scissors.

## 2024-12-27 NOTE — H&P ADULT - HISTORY OF PRESENT ILLNESS
This is a 69-year-old male with past medical history of chronic HFrEF s/p ICD, AVR porcine, HTN, PAF s/p ablation, CAD s/p PCI, DM, HLD, COPD (no home O2), smoker presenting with worsening shortness of breath and weakness.  Patient was admitted within the last 24 hours for acute on chronic CHF exacerbation, was given IV Lasix and BiPAP, and left AMA.  Patient returns stating that he became more short of breath last night after leaving the hospital and would like to be readmitted for treatment.  Denies fever/chills, chest pain, abdominal pain, calf pain, N/V/D/C, cough, and nasal congestion.     This is a 69-year-old male with past medical history of chronic HFrEF s/p ICD, AVR porcine, HTN, PAF s/p ablation, CAD s/p PCI, DM, HLD, COPD (no home O2), smoker presenting with worsening shortness of breath and weakness.  Patient was admitted within the last 24 hours for acute on chronic CHF exacerbation, was given IV Lasix and BiPAP, and left AMA.  Patient returns stating that he became more short of breath last night after leaving the hospital and would like to be readmitted for treatment.  Denies fever/chills, chest pain, abdominal pain, calf pain, N/V/D/C, cough, and nasal congestion.

## 2024-12-27 NOTE — ED PROVIDER NOTE - OBJECTIVE STATEMENT
69-year-old male with past medical history of chronic HFrEF s/p ICD, AVR porcine, HTN, PAF s/p ablation, CAD s/p PCI, DM, HLD, COPD not on home O2, smoker presents with complaint of shortness of breath and weakness.  Patient was admitted within the last 24 hours for acute on chronic CHF exacerbation and administered Lasix and BiPAP, and left AMA.  Patient returns stating that he became more short of breath after leaving the hospital and would like to be readmitted for treatment.  Denies fever/chills, cough, rhinorrhea, nasal congestion.

## 2024-12-27 NOTE — H&P ADULT - NSHPPHYSICALEXAM_GEN_ALL_CORE
T(C): 35.9 (12-27-24 @ 06:21), Max: 37.1 (12-26-24 @ 16:26)  HR: 99 (12-27-24 @ 06:21) (77 - 99)  BP: 123/68 (12-27-24 @ 06:21) (123/68 - 147/86)  RR: 21 (12-27-24 @ 06:56) (21 - 33)  SpO2: 98% (12-27-24 @ 06:56) (95% - 100%)    PHYSICAL EXAM:  GENERAL: NAD, looks stated age  HEAD:  Atraumatic, Normocephalic  EYES: EOMI, PERRLA, conjunctiva and sclera clear  NECK: Supple,   CHEST/LUNG: Diminished breath sounds b/l lower bases  HEART: S1,S2 Regular rate and rhythm; No murmurs, rubs, or gallops  ABDOMEN: Soft, nontender, nondistended, no rebound tenderness; Bowel sounds present and normoactive  EXTREMITIES:  2+ peripheral pulses bilaterally and symmetrically, 1+ pitting edema b/l LE   NEUROLOGY: non-focal, JAUREGUI x4

## 2024-12-27 NOTE — H&P ADULT - ASSESSMENT
This is a 69-year-old male with past medical history of chronic HFrEF s/p ICD, AVR porcine, HTN, PAF s/p ablation, CAD s/p PCI, DM, HLD, COPD (no home O2), smoker presenting with worsening shortness of breath and weakness.  Patient was admitted within the last 24 hours for acute on chronic CHF exacerbation, was given IV Lasix and BiPAP, and left AMA.  Patient returns stating that he became more short of breath last night after leaving the hospital and would like to be readmitted for treatment.  Denies fever/chills, chest pain, abdominal pain, calf pain, N/V/D/C, cough, and nasal congestion.    #Acute on chronic systolic CHF  #Elevated troponin due to above  -Admit to inpatient level of care under telemetry monitoring   -CXR: Unchanged bilateral lung opacities   -C/w IV Lasix 40 mg   -Cardiology consult  -Trend troponin  -Echo  -Daily, wt, I&O  -Monitor lytes  -VTE prophylaxis c/w eliquis  -Holding home Entresto in setting of hyperkalemia     #Paroxysmal Afib (on Eliquis)  -C/w eliquis, amiodarone, toprol    #CAD  -C/w eliquis, plavix, and lipitor    #DM  -ISS inpatient   -Hold metformin in hospital    #COPD (no home O2)    #Hyperlipidemia  -C/w tricor, zetia, lipitor    Diet: CC/DASH  VTE ppx: C/w Eliquis   Activity: Ambulate with assistance     Above discussed with     This is a 69-year-old male with past medical history of chronic HFrEF s/p ICD, AVR porcine, HTN, PAF s/p ablation, CAD s/p PCI, DM, HLD, COPD (no home O2), smoker presenting with worsening shortness of breath and weakness.  Patient was admitted within the last 24 hours for acute on chronic CHF exacerbation, was given IV Lasix and BiPAP, and left AMA.  Patient returns stating that he became more short of breath last night after leaving the hospital and would like to be readmitted for treatment.  Denies fever/chills, chest pain, abdominal pain, calf pain, N/V/D/C, cough, and nasal congestion.    #Acute on chronic systolic CHF  #Elevated troponin due to above  -Admit to inpatient level of care under telemetry monitoring   -CXR: Unchanged bilateral lung opacities   -BNP: 5k  -C/w IV Lasix 40 mg   -Cardiology consult  -Trend troponin  -Echo (last KUSH 10/2024: EF 30 to 35%)  -Daily wt, I&O  -Monitor lytes  -VTE prophylaxis c/w eliquis  -Holding home Entresto in setting of hyperkalemia     #Paroxysmal Afib (on Eliquis)  -C/w eliquis, amiodarone, toprol    #CAD  -C/w eliquis, plavix, and lipitor    #DM  -ISS inpatient   -Hold metformin in hospital    #COPD (no home O2)    #Hyperlipidemia  -C/w tricor, zetia, lipitor    Diet: CC/DASH  VTE ppx: C/w Eliquis   Activity: Ambulate with assistance     Above discussed with     This is a 69-year-old male with past medical history of chronic HFrEF s/p ICD, AVR porcine, HTN, PAF s/p ablation, CAD s/p PCI, DM, HLD, COPD (no home O2), smoker presenting with worsening shortness of breath and weakness.  Patient was admitted within the last 24 hours for acute on chronic CHF exacerbation, was given IV Lasix and BiPAP, and left AMA.  Patient returns stating that he became more short of breath last night after leaving the hospital and would like to be readmitted for treatment.  Denies fever/chills, chest pain, abdominal pain, calf pain, N/V/D/C, cough, and nasal congestion. Patient does not recall name of cardiologist.     #Acute on chronic systolic CHF vs PNA   #Elevated troponin due to above  -Admit to inpatient level of care under telemetry monitoring   -CXR: Unchanged bilateral lung opacities   -BNP: 5k  -C/w IV Lasix 40 mg   -C/w IV Ceftriaxone and Azithromycin, f/u procalcitonin  -Cardiology consult  -Trend troponin  -Echo (last KUSH 10/2024: EF 30 to 35%)  -Daily wt, I&O  -Monitor lytes  -VTE prophylaxis c/w eliquis  -Holding home Entresto in setting of hyperkalemia     #Paroxysmal Afib (on Eliquis)  -C/w eliquis, amiodarone, toprol    #CAD  -C/w eliquis, plavix, and lipitor    #DM  -ISS inpatient   -Hold metformin in hospital    #COPD (no home O2)    #Hyperlipidemia  -C/w tricor, zetia, lipitor    Diet: CC/DASH  VTE ppx: C/w Eliquis   Activity: Ambulate with assistance     Above discussed with

## 2024-12-27 NOTE — ED PROVIDER NOTE - PHYSICAL EXAMINATION
Vital Signs: I have reviewed the initial vital signs.  Constitutional: appears stated age, no acute distress  Eyes: Sclera clear, EOMI.  Cardiovascular: S1 and S2, regular rate, regular rhythm, well-perfused extremities, radial pulses equal and 2+, pedal pulses 2+ and equal  Respiratory: unlabored respiratory effort, bilateral rales and decreased bibasilar breath sounds  Gastrointestinal:  abdomen soft, non-tender  Integumentary: warm, dry, no rash  Neurologic: awake, alert, oriented x3, extremities’ motor and sensory functions grossly intact

## 2024-12-27 NOTE — H&P ADULT - NSHPSOCIALHISTORY_GEN_ALL_CORE
Reports last smoked tobacco 3 weeks ago. Denies alcohol and illicit drug use. Ambulates with a cane at baseline.

## 2024-12-28 LAB
A1C WITH ESTIMATED AVERAGE GLUCOSE RESULT: 9.9 % — HIGH (ref 4–5.6)
ALBUMIN SERPL ELPH-MCNC: 3.8 G/DL — SIGNIFICANT CHANGE UP (ref 3.5–5.2)
ALP SERPL-CCNC: 62 U/L — SIGNIFICANT CHANGE UP (ref 30–115)
ALT FLD-CCNC: 15 U/L — SIGNIFICANT CHANGE UP (ref 0–41)
ANION GAP SERPL CALC-SCNC: 16 MMOL/L — HIGH (ref 7–14)
AST SERPL-CCNC: 24 U/L — SIGNIFICANT CHANGE UP (ref 0–41)
BASOPHILS # BLD AUTO: 0.09 K/UL — SIGNIFICANT CHANGE UP (ref 0–0.2)
BASOPHILS NFR BLD AUTO: 0.6 % — SIGNIFICANT CHANGE UP (ref 0–1)
BILIRUB SERPL-MCNC: 0.7 MG/DL — SIGNIFICANT CHANGE UP (ref 0.2–1.2)
BUN SERPL-MCNC: 22 MG/DL — HIGH (ref 10–20)
CALCIUM SERPL-MCNC: 9.7 MG/DL — SIGNIFICANT CHANGE UP (ref 8.4–10.5)
CHLORIDE SERPL-SCNC: 103 MMOL/L — SIGNIFICANT CHANGE UP (ref 98–110)
CO2 SERPL-SCNC: 23 MMOL/L — SIGNIFICANT CHANGE UP (ref 17–32)
CREAT SERPL-MCNC: 1.3 MG/DL — SIGNIFICANT CHANGE UP (ref 0.7–1.5)
EGFR: 59 ML/MIN/1.73M2 — LOW
EOSINOPHIL # BLD AUTO: 0.2 K/UL — SIGNIFICANT CHANGE UP (ref 0–0.7)
EOSINOPHIL NFR BLD AUTO: 1.4 % — SIGNIFICANT CHANGE UP (ref 0–8)
ESTIMATED AVERAGE GLUCOSE: 237 MG/DL — HIGH (ref 68–114)
GLUCOSE BLDC GLUCOMTR-MCNC: 193 MG/DL — HIGH (ref 70–99)
GLUCOSE BLDC GLUCOMTR-MCNC: 219 MG/DL — HIGH (ref 70–99)
GLUCOSE BLDC GLUCOMTR-MCNC: 289 MG/DL — HIGH (ref 70–99)
GLUCOSE SERPL-MCNC: 71 MG/DL — SIGNIFICANT CHANGE UP (ref 70–99)
HCT VFR BLD CALC: 45.7 % — SIGNIFICANT CHANGE UP (ref 42–52)
HGB BLD-MCNC: 15 G/DL — SIGNIFICANT CHANGE UP (ref 14–18)
IMM GRANULOCYTES NFR BLD AUTO: 0.4 % — HIGH (ref 0.1–0.3)
LYMPHOCYTES # BLD AUTO: 1.87 K/UL — SIGNIFICANT CHANGE UP (ref 1.2–3.4)
LYMPHOCYTES # BLD AUTO: 13 % — LOW (ref 20.5–51.1)
MAGNESIUM SERPL-MCNC: 1.9 MG/DL — SIGNIFICANT CHANGE UP (ref 1.8–2.4)
MCHC RBC-ENTMCNC: 29.7 PG — SIGNIFICANT CHANGE UP (ref 27–31)
MCHC RBC-ENTMCNC: 32.8 G/DL — SIGNIFICANT CHANGE UP (ref 32–37)
MCV RBC AUTO: 90.5 FL — SIGNIFICANT CHANGE UP (ref 80–94)
MONOCYTES # BLD AUTO: 1.28 K/UL — HIGH (ref 0.1–0.6)
MONOCYTES NFR BLD AUTO: 8.9 % — SIGNIFICANT CHANGE UP (ref 1.7–9.3)
NEUTROPHILS # BLD AUTO: 10.9 K/UL — HIGH (ref 1.4–6.5)
NEUTROPHILS NFR BLD AUTO: 75.7 % — HIGH (ref 42.2–75.2)
NRBC # BLD: 0 /100 WBCS — SIGNIFICANT CHANGE UP (ref 0–0)
PLATELET # BLD AUTO: 364 K/UL — SIGNIFICANT CHANGE UP (ref 130–400)
PMV BLD: 10.8 FL — HIGH (ref 7.4–10.4)
POTASSIUM SERPL-MCNC: 4.3 MMOL/L — SIGNIFICANT CHANGE UP (ref 3.5–5)
POTASSIUM SERPL-SCNC: 4.3 MMOL/L — SIGNIFICANT CHANGE UP (ref 3.5–5)
PROCALCITONIN SERPL-MCNC: 0.04 NG/ML — SIGNIFICANT CHANGE UP (ref 0.02–0.1)
PROT SERPL-MCNC: 6 G/DL — SIGNIFICANT CHANGE UP (ref 6–8)
RBC # BLD: 5.05 M/UL — SIGNIFICANT CHANGE UP (ref 4.7–6.1)
RBC # FLD: 13.7 % — SIGNIFICANT CHANGE UP (ref 11.5–14.5)
SODIUM SERPL-SCNC: 142 MMOL/L — SIGNIFICANT CHANGE UP (ref 135–146)
WBC # BLD: 14.4 K/UL — HIGH (ref 4.8–10.8)
WBC # FLD AUTO: 14.4 K/UL — HIGH (ref 4.8–10.8)

## 2024-12-28 PROCEDURE — 99223 1ST HOSP IP/OBS HIGH 75: CPT

## 2024-12-28 PROCEDURE — 99232 SBSQ HOSP IP/OBS MODERATE 35: CPT

## 2024-12-28 RX ORDER — SACUBITRIL AND VALSARTAN 24; 26 MG/1; MG/1
1 TABLET, FILM COATED ORAL
Refills: 0 | Status: DISCONTINUED | OUTPATIENT
Start: 2024-12-28 | End: 2024-12-31

## 2024-12-28 RX ORDER — ZOLPIDEM TARTRATE 5 MG
5 TABLET ORAL ONCE
Refills: 0 | Status: DISCONTINUED | OUTPATIENT
Start: 2024-12-28 | End: 2024-12-28

## 2024-12-28 RX ORDER — FUROSEMIDE 20 MG
40 TABLET ORAL
Refills: 0 | Status: DISCONTINUED | OUTPATIENT
Start: 2024-12-28 | End: 2024-12-31

## 2024-12-28 RX ADMIN — Medication 5 MILLIGRAM(S): at 00:40

## 2024-12-28 RX ADMIN — CEFTRIAXONE SODIUM 100 MILLIGRAM(S): 1 INJECTION, POWDER, FOR SOLUTION INTRAMUSCULAR; INTRAVENOUS at 12:43

## 2024-12-28 RX ADMIN — Medication 3: at 17:31

## 2024-12-28 RX ADMIN — Medication 40 MILLIGRAM(S): at 05:14

## 2024-12-28 RX ADMIN — Medication 5 MILLIGRAM(S): at 23:42

## 2024-12-28 RX ADMIN — Medication 1: at 12:40

## 2024-12-28 RX ADMIN — AZITHROMYCIN MONOHYDRATE 255 MILLIGRAM(S): 200 POWDER, FOR SUSPENSION ORAL at 12:42

## 2024-12-28 RX ADMIN — APIXABAN 5 MILLIGRAM(S): 5 TABLET, FILM COATED ORAL at 17:33

## 2024-12-28 RX ADMIN — EZETIMIBE 10 MILLIGRAM(S): 10 TABLET ORAL at 12:42

## 2024-12-28 RX ADMIN — SACUBITRIL AND VALSARTAN 1 TABLET(S): 24; 26 TABLET, FILM COATED ORAL at 17:33

## 2024-12-28 RX ADMIN — ATORVASTATIN CALCIUM 40 MILLIGRAM(S): 40 TABLET, FILM COATED ORAL at 21:23

## 2024-12-28 RX ADMIN — ACETAMINOPHEN 650 MILLIGRAM(S): 80 SOLUTION/ DROPS ORAL at 03:03

## 2024-12-28 RX ADMIN — Medication 40 MILLIGRAM(S): at 14:41

## 2024-12-28 RX ADMIN — ACETAMINOPHEN 650 MILLIGRAM(S): 80 SOLUTION/ DROPS ORAL at 02:14

## 2024-12-28 RX ADMIN — Medication 200 MILLIGRAM(S): at 05:14

## 2024-12-28 RX ADMIN — APIXABAN 5 MILLIGRAM(S): 5 TABLET, FILM COATED ORAL at 05:14

## 2024-12-28 RX ADMIN — Medication 3 MILLIGRAM(S): at 21:23

## 2024-12-28 RX ADMIN — AMIODARONE HYDROCHLORIDE 200 MILLIGRAM(S): 200 TABLET ORAL at 05:14

## 2024-12-28 RX ADMIN — CLOPIDOGREL BISULFATE 75 MILLIGRAM(S): 75 TABLET, FILM COATED ORAL at 12:43

## 2024-12-28 RX ADMIN — FENOFIBRATE 145 MILLIGRAM(S): 48 TABLET ORAL at 12:42

## 2024-12-28 NOTE — PROGRESS NOTE ADULT - ASSESSMENT
69-year-old male with past medical history of chronic HFrEF s/p ICD, AVR porcine, HTN, PAF s/p ablation, CAD s/p PCI, DM, HLD, COPD (no home O2), smoker presenting with worsening shortness of breath and weakness. 69-year-old male with past medical history of chronic HFrEF s/p ICD, AVR porcine, HTN, PAF s/p ablation, CAD s/p PCI, DM, HLD, COPD (no home O2), smoker presenting with worsening shortness of breath and weakness with concern for acute on chronic CHF exacerbation    Vitals: afebrile 98.1/HR / /88/ RR 18/ SAO2 97% on 3L O2  Labs: WBC 15k>14k Hb 15 plts 364 Na 142 K 4.3 BUN 22 with Cr 1.3  troponin 56>52>49 lactate 2.1 BNP 5279  EKG: ventricular paced rhythm      #acute on chronic CHF exacerbation in HFrEF  - continue on telemetry as patient is having frequent PVCs currently  - TTE: (10/15/24):  1. Moderately decreased global left ventricular systolic function. Left   ventricular ejection fraction, by visual estimation, is 30 to 35%.   2. Mildly enlarged right ventricle. Moderately reduced RV systolic   function.   3. Moderate to severe left atrial enlargement.   4. Bioprosthetic aortic valve well seated with adequate expanstion.   Normal opening. No paravalvular leak.  - no indication to repeat TTE  - BNP 5279  - cardio recommended to increase IV lasix 40mg from qd to bid until euvolemic with monitoring of BUN/Cr and Na  - resumed home entresto as K has normalized  - low sodium diet <2g  - continue amiodarone 200mg qd and metoprolol succinate 200mg qd (home doses)  - cardio recs appreciated  - correct electrolytes  -Daily wt, I&O    #PNA?  - b/l lung opacities noted on CXR with a leukocytosis of 15k with no fever  - can continue azithromycin with ceftriaxone for now  - follow up procal      #Paroxysmal Afib (on Eliquis)  -C/w eliquis, amiodarone, toprol    #CAD  -C/w eliquis, plavix, and lipitor    #DM  -ISS inpatient   -Hold metformin in hospital    #COPD (no home O2)    #Hyperlipidemia  -C/w tricor, zetia, lipitor    Diet: CC/DASH  VTE ppx: C/w Eliquis   Activity: Ambulate with assistance     Handoff: Can switch lasix from IV bid to qd or po bid if euvolemic tomorrow; needs home O2 or needs to be weaned off.

## 2024-12-28 NOTE — PROGRESS NOTE ADULT - SUBJECTIVE AND OBJECTIVE BOX
Patient is a 69y old  Male who presents with a chief complaint of CHF exacerbation (27 Dec 2024 12:53)      SUBJECTIVE / OVERNIGHT EVENTS: Pt is still volume overloaded and requiring 3L of O2. Normally is on room air and states he was supposed to get home O2 but has not received it yet. Urinating well  ADDITIONAL REVIEW OF SYSTEMS: as above    MEDICATIONS  (STANDING):  aMIOdarone    Tablet 200 milliGRAM(s) Oral daily  apixaban 5 milliGRAM(s) Oral every 12 hours  atorvastatin 40 milliGRAM(s) Oral at bedtime  azithromycin  IVPB      azithromycin  IVPB 500 milliGRAM(s) IV Intermittent every 24 hours  cefTRIAXone   IVPB 1000 milliGRAM(s) IV Intermittent every 24 hours  chlorhexidine 2% Cloths 1 Application(s) Topical <User Schedule>  clopidogrel Tablet 75 milliGRAM(s) Oral daily  dextrose 5%. 1000 milliLiter(s) (50 mL/Hr) IV Continuous <Continuous>  ezetimibe 10 milliGRAM(s) Oral daily  fenofibrate Tablet 145 milliGRAM(s) Oral daily  furosemide   Injectable 40 milliGRAM(s) IV Push daily  insulin lispro (ADMELOG) corrective regimen sliding scale   SubCutaneous three times a day before meals  metoprolol succinate  milliGRAM(s) Oral daily    MEDICATIONS  (PRN):  acetaminophen     Tablet .. 650 milliGRAM(s) Oral every 6 hours PRN Temp greater or equal to 38C (100.4F), Mild Pain (1 - 3)  aluminum hydroxide/magnesium hydroxide/simethicone Suspension 30 milliLiter(s) Oral every 4 hours PRN Dyspepsia  dextrose Oral Gel 15 Gram(s) Oral once PRN Blood Glucose LESS THAN 70 milliGRAM(s)/deciliter  melatonin 3 milliGRAM(s) Oral at bedtime PRN Insomnia  ondansetron Injectable 4 milliGRAM(s) IV Push every 8 hours PRN Nausea and/or Vomiting  zolpidem 5 milliGRAM(s) Oral at bedtime PRN Insomnia      CAPILLARY BLOOD GLUCOSE      POCT Blood Glucose.: 193 mg/dL (28 Dec 2024 11:45)  POCT Blood Glucose.: 75 mg/dL (28 Dec 2024 07:51)  POCT Blood Glucose.: 158 mg/dL (27 Dec 2024 20:58)  POCT Blood Glucose.: 109 mg/dL (27 Dec 2024 16:38)    I&O's Summary      PHYSICAL EXAM:  Vital Signs Last 24 Hrs  T(C): 36.7 (28 Dec 2024 04:03), Max: 36.7 (28 Dec 2024 04:03)  T(F): 98.1 (28 Dec 2024 04:03), Max: 98.1 (28 Dec 2024 04:03)  HR: 103 (28 Dec 2024 04:03) (89 - 103)  BP: 135/88 (28 Dec 2024 04:03) (127/84 - 137/76)  BP(mean): --  RR: 18 (28 Dec 2024 04:03) (18 - 18)  SpO2: 97% (28 Dec 2024 04:03) (95% - 100%)    Parameters below as of 28 Dec 2024 00:32  Patient On (Oxygen Delivery Method): nasal cannula      CONSTITUTIONAL: NAD, chronically ill  EYES: PERRLA; conjunctiva and sclera clear  ENMT: Moist oral mucosa, no pharyngeal injection or exudates; normal dentition  NECK: Supple, no palpable masses; no thyromegaly  RESPIRATORY: Normal respiratory effort; decreased breath sounds in b/l lungs  CARDIOVASCULAR: Regular rate and rhythm, normal S1 and S2, no murmur/rub/gallop; No lower extremity edema; Peripheral pulses are 2+ bilaterally  ABDOMEN: Nontender to palpation, normoactive bowel sounds, no rebound/guarding; No hepatosplenomegaly  MUSCULOSKELETAL:  Normal gait; no clubbing or cyanosis of digits; no joint swelling or tenderness to palpation  PSYCH: A+O to person, place, and time; affect appropriate  NEUROLOGY: CN 2-12 are intact and symmetric; no gross sensory deficits   SKIN: No rashes; no palpable lesions    LABS:                        15.0   14.40 )-----------( 364      ( 28 Dec 2024 07:18 )             45.7     12-28    142  |  103  |  22[H]  ----------------------------<  71  4.3   |  23  |  1.3    Ca    9.7      28 Dec 2024 07:18  Mg     1.9     12-28    TPro  6.0  /  Alb  3.8  /  TBili  0.7  /  DBili  x   /  AST  24  /  ALT  15  /  AlkPhos  62  12-28          Urinalysis Basic - ( 28 Dec 2024 07:18 )    Color: x / Appearance: x / SG: x / pH: x  Gluc: 71 mg/dL / Ketone: x  / Bili: x / Urobili: x   Blood: x / Protein: x / Nitrite: x   Leuk Esterase: x / RBC: x / WBC x   Sq Epi: x / Non Sq Epi: x / Bacteria: x          RADIOLOGY & ADDITIONAL TESTS:  Results Reviewed:   Imaging Personally Reviewed:  Electrocardiogram Personally Reviewed:    COORDINATION OF CARE:  Care Discussed with Consultants/Other Providers [Y/N]:  Prior or Outpatient Records Reviewed [Y/N]:         Patient is a 69y old  Male who presents with a chief complaint of CHF exacerbation (27 Dec 2024 12:53)      SUBJECTIVE / OVERNIGHT EVENTS: Pt is still volume overloaded and requiring 3L of O2. Normally is on room air and states he was supposed to get home O2 but has not received it yet. Urinating well  ADDITIONAL REVIEW OF SYSTEMS: as above    MEDICATIONS  (STANDING):  aMIOdarone    Tablet 200 milliGRAM(s) Oral daily  apixaban 5 milliGRAM(s) Oral every 12 hours  atorvastatin 40 milliGRAM(s) Oral at bedtime  azithromycin  IVPB      azithromycin  IVPB 500 milliGRAM(s) IV Intermittent every 24 hours  cefTRIAXone   IVPB 1000 milliGRAM(s) IV Intermittent every 24 hours  chlorhexidine 2% Cloths 1 Application(s) Topical <User Schedule>  clopidogrel Tablet 75 milliGRAM(s) Oral daily  dextrose 5%. 1000 milliLiter(s) (50 mL/Hr) IV Continuous <Continuous>  ezetimibe 10 milliGRAM(s) Oral daily  fenofibrate Tablet 145 milliGRAM(s) Oral daily  furosemide   Injectable 40 milliGRAM(s) IV Push daily  insulin lispro (ADMELOG) corrective regimen sliding scale   SubCutaneous three times a day before meals  metoprolol succinate  milliGRAM(s) Oral daily    MEDICATIONS  (PRN):  acetaminophen     Tablet .. 650 milliGRAM(s) Oral every 6 hours PRN Temp greater or equal to 38C (100.4F), Mild Pain (1 - 3)  aluminum hydroxide/magnesium hydroxide/simethicone Suspension 30 milliLiter(s) Oral every 4 hours PRN Dyspepsia  dextrose Oral Gel 15 Gram(s) Oral once PRN Blood Glucose LESS THAN 70 milliGRAM(s)/deciliter  melatonin 3 milliGRAM(s) Oral at bedtime PRN Insomnia  ondansetron Injectable 4 milliGRAM(s) IV Push every 8 hours PRN Nausea and/or Vomiting  zolpidem 5 milliGRAM(s) Oral at bedtime PRN Insomnia      CAPILLARY BLOOD GLUCOSE      POCT Blood Glucose.: 193 mg/dL (28 Dec 2024 11:45)  POCT Blood Glucose.: 75 mg/dL (28 Dec 2024 07:51)  POCT Blood Glucose.: 158 mg/dL (27 Dec 2024 20:58)  POCT Blood Glucose.: 109 mg/dL (27 Dec 2024 16:38)    I&O's Summary    ECG:  < from: 12 Lead ECG (12.27.24 @ 06:18) >   Ventricular-paced rhythm  Biventricular pacemaker detected  Abnormal ECG    < from: Transesophageal Echocardiogram (10.15.24 @ 12:18) >    Summary:   1. Moderately decreased global left ventricular systolic function. Left   ventricular ejection fraction, by visual estimation, is 30 to 35%.   2. Mildly enlarged right ventricle. Moderately reduced RV systolic   function.   3. Moderate to severe left atrial enlargement.   4. Bioprosthetic aortic valve well seated with adequate expanstion.   Normal opening. No paravalvular leak.   5. Mild mitral valve regurgitation.   6. Mild tricuspid regurgitation.   7. No left atrial appendage thrombus and decreased left atrial appendage   velocities.   8. After probe removal patient underwent successful synchronized   cardioversion with 200J.    PHYSICAL EXAM:  Vital Signs Last 24 Hrs  T(C): 36.7 (28 Dec 2024 04:03), Max: 36.7 (28 Dec 2024 04:03)  T(F): 98.1 (28 Dec 2024 04:03), Max: 98.1 (28 Dec 2024 04:03)  HR: 103 (28 Dec 2024 04:03) (89 - 103)  BP: 135/88 (28 Dec 2024 04:03) (127/84 - 137/76)  BP(mean): --  RR: 18 (28 Dec 2024 04:03) (18 - 18)  SpO2: 97% (28 Dec 2024 04:03) (95% - 100%)    Parameters below as of 28 Dec 2024 00:32  Patient On (Oxygen Delivery Method): nasal cannula      CONSTITUTIONAL: NAD, chronically ill  EYES: PERRLA; conjunctiva and sclera clear  ENMT: Moist oral mucosa, no pharyngeal injection or exudates; normal dentition  NECK: Supple, no palpable masses; no thyromegaly  RESPIRATORY: Normal respiratory effort; decreased breath sounds in b/l lungs  CARDIOVASCULAR: Regular rate and rhythm, normal S1 and S2, no murmur/rub/gallop; 1+ pitting edema in b/l LE  ABDOMEN: Nontender to palpation, normoactive bowel sounds, no rebound/guarding; No hepatosplenomegaly  MUSCULOSKELETAL: no clubbing or cyanosis of digits; no joint swelling or tenderness to palpation  PSYCH: A+O to person, place, and time; affect appropriate  SKIN: No rashes; no palpable lesions    LABS:                        15.0   14.40 )-----------( 364      ( 28 Dec 2024 07:18 )             45.7     12-28    142  |  103  |  22[H]  ----------------------------<  71  4.3   |  23  |  1.3    Ca    9.7      28 Dec 2024 07:18  Mg     1.9     12-28    TPro  6.0  /  Alb  3.8  /  TBili  0.7  /  DBili  x   /  AST  24  /  ALT  15  /  AlkPhos  62  12-28          Urinalysis Basic - ( 28 Dec 2024 07:18 )    Color: x / Appearance: x / SG: x / pH: x  Gluc: 71 mg/dL / Ketone: x  / Bili: x / Urobili: x   Blood: x / Protein: x / Nitrite: x   Leuk Esterase: x / RBC: x / WBC x   Sq Epi: x / Non Sq Epi: x / Bacteria: x          RADIOLOGY & ADDITIONAL TESTS:  Results Reviewed:   Imaging Personally Reviewed:  Electrocardiogram Personally Reviewed:    COORDINATION OF CARE:  Care Discussed with Consultants/Other Providers [Y/N]:  Prior or Outpatient Records Reviewed [Y/N]:

## 2024-12-29 LAB
ALBUMIN SERPL ELPH-MCNC: 3.8 G/DL — SIGNIFICANT CHANGE UP (ref 3.5–5.2)
ALP SERPL-CCNC: 120 U/L — HIGH (ref 30–115)
ALT FLD-CCNC: 24 U/L — SIGNIFICANT CHANGE UP (ref 0–41)
ANION GAP SERPL CALC-SCNC: 14 MMOL/L — SIGNIFICANT CHANGE UP (ref 7–14)
ANION GAP SERPL CALC-SCNC: 16 MMOL/L — HIGH (ref 7–14)
AST SERPL-CCNC: 25 U/L — SIGNIFICANT CHANGE UP (ref 0–41)
BASOPHILS # BLD AUTO: 0.07 K/UL — SIGNIFICANT CHANGE UP (ref 0–0.2)
BASOPHILS NFR BLD AUTO: 0.4 % — SIGNIFICANT CHANGE UP (ref 0–1)
BILIRUB SERPL-MCNC: 0.9 MG/DL — SIGNIFICANT CHANGE UP (ref 0.2–1.2)
BUN SERPL-MCNC: 24 MG/DL — HIGH (ref 10–20)
BUN SERPL-MCNC: 24 MG/DL — HIGH (ref 10–20)
CALCIUM SERPL-MCNC: 9.4 MG/DL — SIGNIFICANT CHANGE UP (ref 8.4–10.5)
CALCIUM SERPL-MCNC: 9.7 MG/DL — SIGNIFICANT CHANGE UP (ref 8.4–10.5)
CHLORIDE SERPL-SCNC: 98 MMOL/L — SIGNIFICANT CHANGE UP (ref 98–110)
CHLORIDE SERPL-SCNC: 99 MMOL/L — SIGNIFICANT CHANGE UP (ref 98–110)
CO2 SERPL-SCNC: 24 MMOL/L — SIGNIFICANT CHANGE UP (ref 17–32)
CO2 SERPL-SCNC: 26 MMOL/L — SIGNIFICANT CHANGE UP (ref 17–32)
CREAT SERPL-MCNC: 1.2 MG/DL — SIGNIFICANT CHANGE UP (ref 0.7–1.5)
CREAT SERPL-MCNC: 1.2 MG/DL — SIGNIFICANT CHANGE UP (ref 0.7–1.5)
EGFR: 65 ML/MIN/1.73M2 — SIGNIFICANT CHANGE UP
EGFR: 65 ML/MIN/1.73M2 — SIGNIFICANT CHANGE UP
EOSINOPHIL # BLD AUTO: 0.08 K/UL — SIGNIFICANT CHANGE UP (ref 0–0.7)
EOSINOPHIL NFR BLD AUTO: 0.5 % — SIGNIFICANT CHANGE UP (ref 0–8)
GLUCOSE BLDC GLUCOMTR-MCNC: 97 MG/DL — SIGNIFICANT CHANGE UP (ref 70–99)
GLUCOSE SERPL-MCNC: 92 MG/DL — SIGNIFICANT CHANGE UP (ref 70–99)
GLUCOSE SERPL-MCNC: 96 MG/DL — SIGNIFICANT CHANGE UP (ref 70–99)
HCT VFR BLD CALC: 47.3 % — SIGNIFICANT CHANGE UP (ref 42–52)
HGB BLD-MCNC: 15.2 G/DL — SIGNIFICANT CHANGE UP (ref 14–18)
IMM GRANULOCYTES NFR BLD AUTO: 0.6 % — HIGH (ref 0.1–0.3)
LYMPHOCYTES # BLD AUTO: 1.19 K/UL — LOW (ref 1.2–3.4)
LYMPHOCYTES # BLD AUTO: 7.3 % — LOW (ref 20.5–51.1)
MAGNESIUM SERPL-MCNC: 2.3 MG/DL — SIGNIFICANT CHANGE UP (ref 1.8–2.4)
MCHC RBC-ENTMCNC: 28.8 PG — SIGNIFICANT CHANGE UP (ref 27–31)
MCHC RBC-ENTMCNC: 32.1 G/DL — SIGNIFICANT CHANGE UP (ref 32–37)
MCV RBC AUTO: 89.6 FL — SIGNIFICANT CHANGE UP (ref 80–94)
MONOCYTES # BLD AUTO: 1.38 K/UL — HIGH (ref 0.1–0.6)
MONOCYTES NFR BLD AUTO: 8.4 % — SIGNIFICANT CHANGE UP (ref 1.7–9.3)
NEUTROPHILS # BLD AUTO: 13.57 K/UL — HIGH (ref 1.4–6.5)
NEUTROPHILS NFR BLD AUTO: 82.8 % — HIGH (ref 42.2–75.2)
NRBC # BLD: 0 /100 WBCS — SIGNIFICANT CHANGE UP (ref 0–0)
PLATELET # BLD AUTO: 375 K/UL — SIGNIFICANT CHANGE UP (ref 130–400)
PMV BLD: 11.2 FL — HIGH (ref 7.4–10.4)
POTASSIUM SERPL-MCNC: 4 MMOL/L — SIGNIFICANT CHANGE UP (ref 3.5–5)
POTASSIUM SERPL-MCNC: 4 MMOL/L — SIGNIFICANT CHANGE UP (ref 3.5–5)
POTASSIUM SERPL-SCNC: 4 MMOL/L — SIGNIFICANT CHANGE UP (ref 3.5–5)
POTASSIUM SERPL-SCNC: 4 MMOL/L — SIGNIFICANT CHANGE UP (ref 3.5–5)
PROT SERPL-MCNC: 6.4 G/DL — SIGNIFICANT CHANGE UP (ref 6–8)
RBC # BLD: 5.28 M/UL — SIGNIFICANT CHANGE UP (ref 4.7–6.1)
RBC # FLD: 13.2 % — SIGNIFICANT CHANGE UP (ref 11.5–14.5)
SODIUM SERPL-SCNC: 138 MMOL/L — SIGNIFICANT CHANGE UP (ref 135–146)
SODIUM SERPL-SCNC: 139 MMOL/L — SIGNIFICANT CHANGE UP (ref 135–146)
WBC # BLD: 16.39 K/UL — HIGH (ref 4.8–10.8)
WBC # FLD AUTO: 16.39 K/UL — HIGH (ref 4.8–10.8)

## 2024-12-29 PROCEDURE — 99232 SBSQ HOSP IP/OBS MODERATE 35: CPT

## 2024-12-29 PROCEDURE — G0427: CPT | Mod: 95

## 2024-12-29 RX ORDER — FUROSEMIDE 20 MG
20 TABLET ORAL ONCE
Refills: 0 | Status: COMPLETED | OUTPATIENT
Start: 2024-12-29 | End: 2024-12-29

## 2024-12-29 RX ORDER — INSULIN LISPRO 100/ML
4 VIAL (ML) SUBCUTANEOUS ONCE
Refills: 0 | Status: COMPLETED | OUTPATIENT
Start: 2024-12-29 | End: 2024-12-29

## 2024-12-29 RX ORDER — MAGNESIUM SULFATE 500 MG/ML
1 INJECTION, SOLUTION INTRAMUSCULAR; INTRAVENOUS ONCE
Refills: 0 | Status: COMPLETED | OUTPATIENT
Start: 2024-12-29 | End: 2024-12-29

## 2024-12-29 RX ADMIN — AMIODARONE HYDROCHLORIDE 200 MILLIGRAM(S): 200 TABLET ORAL at 05:20

## 2024-12-29 RX ADMIN — SACUBITRIL AND VALSARTAN 1 TABLET(S): 24; 26 TABLET, FILM COATED ORAL at 05:20

## 2024-12-29 RX ADMIN — Medication 40 MILLIGRAM(S): at 05:20

## 2024-12-29 RX ADMIN — Medication 5 MILLIGRAM(S): at 21:08

## 2024-12-29 RX ADMIN — APIXABAN 5 MILLIGRAM(S): 5 TABLET, FILM COATED ORAL at 17:57

## 2024-12-29 RX ADMIN — ATORVASTATIN CALCIUM 40 MILLIGRAM(S): 40 TABLET, FILM COATED ORAL at 21:07

## 2024-12-29 RX ADMIN — AZITHROMYCIN MONOHYDRATE 255 MILLIGRAM(S): 200 POWDER, FOR SUSPENSION ORAL at 12:19

## 2024-12-29 RX ADMIN — MAGNESIUM SULFATE 100 GRAM(S): 500 INJECTION, SOLUTION INTRAMUSCULAR; INTRAVENOUS at 10:00

## 2024-12-29 RX ADMIN — Medication 4 UNIT(S): at 21:39

## 2024-12-29 RX ADMIN — EZETIMIBE 10 MILLIGRAM(S): 10 TABLET ORAL at 12:17

## 2024-12-29 RX ADMIN — APIXABAN 5 MILLIGRAM(S): 5 TABLET, FILM COATED ORAL at 05:20

## 2024-12-29 RX ADMIN — Medication 200 MILLIGRAM(S): at 04:41

## 2024-12-29 RX ADMIN — FENOFIBRATE 145 MILLIGRAM(S): 48 TABLET ORAL at 12:17

## 2024-12-29 RX ADMIN — SACUBITRIL AND VALSARTAN 1 TABLET(S): 24; 26 TABLET, FILM COATED ORAL at 17:57

## 2024-12-29 RX ADMIN — Medication 20 MILLIGRAM(S): at 07:12

## 2024-12-29 RX ADMIN — CEFTRIAXONE SODIUM 100 MILLIGRAM(S): 1 INJECTION, POWDER, FOR SOLUTION INTRAMUSCULAR; INTRAVENOUS at 12:20

## 2024-12-29 RX ADMIN — Medication 1: at 17:33

## 2024-12-29 RX ADMIN — CLOPIDOGREL BISULFATE 75 MILLIGRAM(S): 75 TABLET, FILM COATED ORAL at 12:17

## 2024-12-29 RX ADMIN — Medication 40 MILLIGRAM(S): at 14:18

## 2024-12-29 NOTE — CONSULT NOTE ADULT - ASSESSMENT
# AF   - Currently on  Amio 200 mg daiy, Eliquis 5 mg BID, and Metoprolol Succ  mg daily  - Was scheduled for AF ablation on 1/2. Will need to see how pulm status is and if he obtains clearance for general anesthesia for AF ablation    # ? PNA  - B/l lung opacities noted on CXR with a leukocytosis of 15k with no fever  - Currently on azithromycin with ceftriaxone     Plan d/w wife

## 2024-12-29 NOTE — PROGRESS NOTE ADULT - ASSESSMENT
69-year-old male with past medical history of chronic HFrEF s/p ICD, AVR porcine, HTN, PAF s/p ablation, CAD s/p PCI, DM, HLD, COPD (no home O2), smoker presenting with worsening shortness of breath and weakness with concern for acute on chronic CHF exacerbation    Vitals: afebrile 98.1/HR / /88/ RR 18/ SAO2 97% on 3L O2  Labs: WBC 15k>14k Hb 15 plts 364 Na 142 K 4.3 BUN 22 with Cr 1.3  troponin 56>52>49 lactate 2.1 BNP 5279  EKG: ventricular paced rhythm      #acute on chronic CHF exacerbation in HFrEF  - continue on telemetry as patient is having frequent PVCs currently  - TTE: (10/15/24):  1. Moderately decreased global left ventricular systolic function. Left   ventricular ejection fraction, by visual estimation, is 30 to 35%.   2. Mildly enlarged right ventricle. Moderately reduced RV systolic   function.   3. Moderate to severe left atrial enlargement.   4. Bioprosthetic aortic valve well seated with adequate expanstion.   Normal opening. No paravalvular leak.  - no indication to repeat TTE  - BNP 5279  - cardio recommended to increase IV lasix 40mg from qd to bid until euvolemic with monitoring of BUN/Cr and Na  - resumed home entresto as K has normalized  - low sodium diet <2g  - continue amiodarone 200mg qd and metoprolol succinate 200mg qd (home doses)  - cardio recs appreciated  - correct electrolytes  -Daily wt, I&O    #PNA?  - b/l lung opacities noted on CXR with a leukocytosis of 15k with no fever  - can continue azithromycin with ceftriaxone for now      #Paroxysmal Afib (on Eliquis)  -C/w eliquis, amiodarone, toprol    #CAD  -C/w eliquis, plavix, and lipitor    #DM  -ISS inpatient   -Hold metformin in hospital    #COPD (no home O2)    #Hyperlipidemia  -C/w tricor, zetia, lipitor    Diet: CC/DASH  VTE ppx: C/w Eliquis   Activity: Ambulate with assistance

## 2024-12-29 NOTE — CONSULT NOTE ADULT - SUBJECTIVE AND OBJECTIVE BOX
Patient is a 69y old  Male who presents with a chief complaint of CHF exacerbation (29 Dec 2024 12:06)      HPI:  69-year-old male with past medical history of chronic HFrEF s/p ICD, AVR porcine, HTN, PAF s/p ablation, CAD s/p PCI, DM, HLD, COPD not on home O2, smoker presents with complaint of shortness of breath and weakness.  Patient was admitted within the last 24 hours for acute on chronic CHF exacerbation and administered Lasix and BiPAP, and left AMA.  Patient returns stating that he became more short of breath after leaving the hospital and would like to be readmitted for treatment.  Denies fever/chills, cough, rhinorrhea, nasal congestion.      PAST MEDICAL & SURGICAL HISTORY:  CHF (congestive heart failure)      Diabetes      CAD (coronary artery disease)      Chronic obstructive pulmonary disease (COPD)      HTN (hypertension)      Stented coronary artery      Dyslipidemia      GERD (gastroesophageal reflux disease)      Afib      CVA (cerebral vascular accident)      H/O heart artery stent      Heart valve replaced  aortic      History of Nissen fundoplication            PREVIOUS DIAGNOSTIC TESTING:      ECHO  FINDINGS:  < from: TTE Echo Complete w/o Contrast w/ Doppler (12.27.24 @ 12:57) >  Summary:   1. Left ventricular ejection fraction, by visual estimation, is 40 to   45%.   2. Mildly enlarged left atrium.   3. Mild mitral annular calcification.   4. Mild mitral valve regurgitation.   5. Mild tricuspid regurgitation.   6. Bioprosthesis in the aortic position.   7. Ascending aorta 3.7 cm.   8. Estimated pulmonary artery systolic pressure is 35.7 mmHg assuming a   right atrial pressure of 3 mmHg, which is consistent with borderline   pulmonary hypertension.    < end of copied text >      STRESS  FINDINGS:    CATHETERIZATION  FINDINGS:  1ST LESION INTERVENTIONS: A successful drug-eluting stent with balloon angioplasty and myocardial flow reserve measurement was performed on the 70 % lesion in the mid LAD. Following intervention there was an excellent angiographic appearance with a 0 % residual stenosis.        ELECTROPHYSIOLOGY STUDY  FINDINGS:    CAROTID ULTRASOUND:  FINDINGS    VENOUS DUPLEX SCAN:  FINDINGS:    CHEST CT PULMONARY ANGIO with IV Contrast:  FINDINGS:    MEDICATIONS  (STANDING):  aMIOdarone    Tablet 200 milliGRAM(s) Oral daily  apixaban 5 milliGRAM(s) Oral every 12 hours  atorvastatin 40 milliGRAM(s) Oral at bedtime  azithromycin  IVPB      azithromycin  IVPB 500 milliGRAM(s) IV Intermittent every 24 hours  cefTRIAXone   IVPB 1000 milliGRAM(s) IV Intermittent every 24 hours  chlorhexidine 2% Cloths 1 Application(s) Topical <User Schedule>  clopidogrel Tablet 75 milliGRAM(s) Oral daily  dextrose 5%. 1000 milliLiter(s) (50 mL/Hr) IV Continuous <Continuous>  ezetimibe 10 milliGRAM(s) Oral daily  fenofibrate Tablet 145 milliGRAM(s) Oral daily  furosemide   Injectable 40 milliGRAM(s) IV Push two times a day  insulin lispro (ADMELOG) corrective regimen sliding scale   SubCutaneous three times a day before meals  metoprolol succinate  milliGRAM(s) Oral daily  sacubitril 24 mG/valsartan 26 mG 1 Tablet(s) Oral two times a day    MEDICATIONS  (PRN):  acetaminophen     Tablet .. 650 milliGRAM(s) Oral every 6 hours PRN Temp greater or equal to 38C (100.4F), Mild Pain (1 - 3)  aluminum hydroxide/magnesium hydroxide/simethicone Suspension 30 milliLiter(s) Oral every 4 hours PRN Dyspepsia  dextrose Oral Gel 15 Gram(s) Oral once PRN Blood Glucose LESS THAN 70 milliGRAM(s)/deciliter  melatonin 3 milliGRAM(s) Oral at bedtime PRN Insomnia  ondansetron Injectable 4 milliGRAM(s) IV Push every 8 hours PRN Nausea and/or Vomiting  zolpidem 5 milliGRAM(s) Oral at bedtime PRN Insomnia      FAMILY HISTORY:      SOCIAL HISTORY  CIGARETTES:  ALCOHOL:    Past Surgical History:    Allergies:    sulfa drugs (Unknown)      REVIEW OF SYSTEMS:  + dyspnea; otherwise per HPI    Vital Signs Last 24 Hrs  T(C): 37.2 (29 Dec 2024 21:02), Max: 37.2 (29 Dec 2024 21:02)  T(F): 99 (29 Dec 2024 21:02), Max: 99 (29 Dec 2024 21:02)  HR: 88 (29 Dec 2024 21:02) (88 - 104)  BP: 139/77 (29 Dec 2024 21:02) (116/87 - 155/78)  BP(mean): --  RR: 18 (29 Dec 2024 13:30) (18 - 20)  SpO2: 98% (29 Dec 2024 21:02) (95% - 98%)    Parameters below as of 29 Dec 2024 13:30  Patient On (Oxygen Delivery Method): nasal cannula  O2 Flow (L/min): 3      PHYSICAL EXAM  Deferred since pt is at [facility]      ECG:    I&O's Detail    28 Dec 2024 07:01  -  29 Dec 2024 07:00  --------------------------------------------------------  IN:  Total IN: 0 mL    OUT:    Voided (mL): 1000 mL  Total OUT: 1000 mL    Total NET: -1000 mL      29 Dec 2024 07:01  -  29 Dec 2024 22:50  --------------------------------------------------------  IN:  Total IN: 0 mL    OUT:    Voided (mL): 1800 mL  Total OUT: 1800 mL    Total NET: -1800 mL          LABS:                        15.2   16.39 )-----------( 375      ( 29 Dec 2024 08:52 )             47.3     12-29    138  |  98  |  24[H]  ----------------------------<  96  4.0   |  26  |  1.2    Ca    9.4      29 Dec 2024 12:21  Mg     2.3     12-29    TPro  6.4  /  Alb  3.8  /  TBili  0.9  /  DBili  x   /  AST  25  /  ALT  24  /  AlkPhos  120[H]  12-29          Urinalysis Basic - ( 29 Dec 2024 12:21 )    Color: x / Appearance: x / SG: x / pH: x  Gluc: 96 mg/dL / Ketone: x  / Bili: x / Urobili: x   Blood: x / Protein: x / Nitrite: x   Leuk Esterase: x / RBC: x / WBC x   Sq Epi: x / Non Sq Epi: x / Bacteria: x      BNP  I&O's Detail    28 Dec 2024 07:01  -  29 Dec 2024 07:00  --------------------------------------------------------  IN:  Total IN: 0 mL    OUT:    Voided (mL): 1000 mL  Total OUT: 1000 mL    Total NET: -1000 mL      29 Dec 2024 07:01  -  29 Dec 2024 22:50  --------------------------------------------------------  IN:  Total IN: 0 mL    OUT:    Voided (mL): 1800 mL  Total OUT: 1800 mL    Total NET: -1800 mL        Daily     Daily     RADIOLOGY & ADDITIONAL STUDIES:

## 2024-12-29 NOTE — PROGRESS NOTE ADULT - SUBJECTIVE AND OBJECTIVE BOX
Patient is a 69y old  Male who presents with a chief complaint of CHF exacerbation (27 Dec 2024 12:53)      SUBJECTIVE / OVERNIGHT EVENTS: Pt is still volume overloaded and requiring 3L of O2. Normally is on room air and states he was supposed to get home O2 but has not received it yet. Urinating well  ADDITIONAL REVIEW OF SYSTEMS: as above    MEDICATIONS  (STANDING):  aMIOdarone    Tablet 200 milliGRAM(s) Oral daily  apixaban 5 milliGRAM(s) Oral every 12 hours  atorvastatin 40 milliGRAM(s) Oral at bedtime  azithromycin  IVPB      azithromycin  IVPB 500 milliGRAM(s) IV Intermittent every 24 hours  cefTRIAXone   IVPB 1000 milliGRAM(s) IV Intermittent every 24 hours  chlorhexidine 2% Cloths 1 Application(s) Topical <User Schedule>  clopidogrel Tablet 75 milliGRAM(s) Oral daily  dextrose 5%. 1000 milliLiter(s) (50 mL/Hr) IV Continuous <Continuous>  ezetimibe 10 milliGRAM(s) Oral daily  fenofibrate Tablet 145 milliGRAM(s) Oral daily  furosemide   Injectable 40 milliGRAM(s) IV Push daily  insulin lispro (ADMELOG) corrective regimen sliding scale   SubCutaneous three times a day before meals  metoprolol succinate  milliGRAM(s) Oral daily    MEDICATIONS  (PRN):  acetaminophen     Tablet .. 650 milliGRAM(s) Oral every 6 hours PRN Temp greater or equal to 38C (100.4F), Mild Pain (1 - 3)  aluminum hydroxide/magnesium hydroxide/simethicone Suspension 30 milliLiter(s) Oral every 4 hours PRN Dyspepsia  dextrose Oral Gel 15 Gram(s) Oral once PRN Blood Glucose LESS THAN 70 milliGRAM(s)/deciliter  melatonin 3 milliGRAM(s) Oral at bedtime PRN Insomnia  ondansetron Injectable 4 milliGRAM(s) IV Push every 8 hours PRN Nausea and/or Vomiting  zolpidem 5 milliGRAM(s) Oral at bedtime PRN Insomnia      CAPILLARY BLOOD GLUCOSE      POCT Blood Glucose.: 193 mg/dL (28 Dec 2024 11:45)  POCT Blood Glucose.: 75 mg/dL (28 Dec 2024 07:51)  POCT Blood Glucose.: 158 mg/dL (27 Dec 2024 20:58)  POCT Blood Glucose.: 109 mg/dL (27 Dec 2024 16:38)    I&O's Summary    ECG:  < from: 12 Lead ECG (12.27.24 @ 06:18) >   Ventricular-paced rhythm  Biventricular pacemaker detected  Abnormal ECG    < from: Transesophageal Echocardiogram (10.15.24 @ 12:18) >    Summary:   1. Moderately decreased global left ventricular systolic function. Left   ventricular ejection fraction, by visual estimation, is 30 to 35%.   2. Mildly enlarged right ventricle. Moderately reduced RV systolic   function.   3. Moderate to severe left atrial enlargement.   4. Bioprosthetic aortic valve well seated with adequate expanstion.   Normal opening. No paravalvular leak.   5. Mild mitral valve regurgitation.   6. Mild tricuspid regurgitation.   7. No left atrial appendage thrombus and decreased left atrial appendage   velocities.   8. After probe removal patient underwent successful synchronized   cardioversion with 200J.    PHYSICAL EXAM:  Vital Signs Last 24 Hrs  T(C): 36.7 (28 Dec 2024 04:03), Max: 36.7 (28 Dec 2024 04:03)  T(F): 98.1 (28 Dec 2024 04:03), Max: 98.1 (28 Dec 2024 04:03)  HR: 103 (28 Dec 2024 04:03) (89 - 103)  BP: 135/88 (28 Dec 2024 04:03) (127/84 - 137/76)  BP(mean): --  RR: 18 (28 Dec 2024 04:03) (18 - 18)  SpO2: 97% (28 Dec 2024 04:03) (95% - 100%)    Parameters below as of 28 Dec 2024 00:32  Patient On (Oxygen Delivery Method): nasal cannula      CONSTITUTIONAL: NAD, chronically ill  EYES: PERRLA; conjunctiva and sclera clear  ENMT: Moist oral mucosa, no pharyngeal injection or exudates; normal dentition  NECK: Supple, no palpable masses; no thyromegaly  RESPIRATORY: Normal respiratory effort; decreased breath sounds in b/l lungs  CARDIOVASCULAR: Regular rate and rhythm, normal S1 and S2, no murmur/rub/gallop; 1+ pitting edema in b/l LE  ABDOMEN: Nontender to palpation, normoactive bowel sounds, no rebound/guarding; No hepatosplenomegaly  MUSCULOSKELETAL: no clubbing or cyanosis of digits; no joint swelling or tenderness to palpation  PSYCH: A+O to person, place, and time; affect appropriate  SKIN: No rashes; no palpable lesions                            15.2   16.39 )-----------( 375      ( 29 Dec 2024 08:52 )             47.3       12-29    139  |  99  |  24[H]  ----------------------------<  92  4.0   |  24  |  1.2    Ca    9.7      29 Dec 2024 08:52  Mg     1.9     12-28    TPro  6.0  /  Alb  3.8  /  TBili  0.7  /  DBili  x   /  AST  24  /  ALT  15  /  AlkPhos  62  12-28              Urinalysis Basic - ( 29 Dec 2024 08:52 )    Color: x / Appearance: x / SG: x / pH: x  Gluc: 92 mg/dL / Ketone: x  / Bili: x / Urobili: x   Blood: x / Protein: x / Nitrite: x   Leuk Esterase: x / RBC: x / WBC x   Sq Epi: x / Non Sq Epi: x / Bacteria: x            Lactate Trend            CAPILLARY BLOOD GLUCOSE      POCT Blood Glucose.: 99 mg/dL (29 Dec 2024 11:49)                Urinalysis Basic - ( 28 Dec 2024 07:18 )    Color: x / Appearance: x / SG: x / pH: x  Gluc: 71 mg/dL / Ketone: x  / Bili: x / Urobili: x   Blood: x / Protein: x / Nitrite: x   Leuk Esterase: x / RBC: x / WBC x   Sq Epi: x / Non Sq Epi: x / Bacteria: x          RADIOLOGY & ADDITIONAL TESTS:  Results Reviewed:   Imaging Personally Reviewed:  Electrocardiogram Personally Reviewed:    COORDINATION OF CARE:  Care Discussed with Consultants/Other Providers [Y/N]:  Prior or Outpatient Records Reviewed [Y/N]:

## 2024-12-30 LAB
ALBUMIN SERPL ELPH-MCNC: 3.6 G/DL — SIGNIFICANT CHANGE UP (ref 3.5–5.2)
ALP SERPL-CCNC: 107 U/L — SIGNIFICANT CHANGE UP (ref 30–115)
ALT FLD-CCNC: 21 U/L — SIGNIFICANT CHANGE UP (ref 0–41)
ANION GAP SERPL CALC-SCNC: 11 MMOL/L — SIGNIFICANT CHANGE UP (ref 7–14)
AST SERPL-CCNC: 20 U/L — SIGNIFICANT CHANGE UP (ref 0–41)
BASOPHILS # BLD AUTO: 0.09 K/UL — SIGNIFICANT CHANGE UP (ref 0–0.2)
BASOPHILS NFR BLD AUTO: 0.6 % — SIGNIFICANT CHANGE UP (ref 0–1)
BILIRUB SERPL-MCNC: 0.8 MG/DL — SIGNIFICANT CHANGE UP (ref 0.2–1.2)
BUN SERPL-MCNC: 25 MG/DL — HIGH (ref 10–20)
CALCIUM SERPL-MCNC: 9.4 MG/DL — SIGNIFICANT CHANGE UP (ref 8.4–10.5)
CHLORIDE SERPL-SCNC: 99 MMOL/L — SIGNIFICANT CHANGE UP (ref 98–110)
CO2 SERPL-SCNC: 28 MMOL/L — SIGNIFICANT CHANGE UP (ref 17–32)
CREAT SERPL-MCNC: 1.2 MG/DL — SIGNIFICANT CHANGE UP (ref 0.7–1.5)
EGFR: 65 ML/MIN/1.73M2 — SIGNIFICANT CHANGE UP
EOSINOPHIL # BLD AUTO: 0.08 K/UL — SIGNIFICANT CHANGE UP (ref 0–0.7)
EOSINOPHIL NFR BLD AUTO: 0.5 % — SIGNIFICANT CHANGE UP (ref 0–8)
GLUCOSE SERPL-MCNC: 132 MG/DL — HIGH (ref 70–99)
HCT VFR BLD CALC: 46 % — SIGNIFICANT CHANGE UP (ref 42–52)
HGB BLD-MCNC: 15 G/DL — SIGNIFICANT CHANGE UP (ref 14–18)
IMM GRANULOCYTES NFR BLD AUTO: 0.6 % — HIGH (ref 0.1–0.3)
LYMPHOCYTES # BLD AUTO: 1.49 K/UL — SIGNIFICANT CHANGE UP (ref 1.2–3.4)
LYMPHOCYTES # BLD AUTO: 9.5 % — LOW (ref 20.5–51.1)
MAGNESIUM SERPL-MCNC: 2.1 MG/DL — SIGNIFICANT CHANGE UP (ref 1.8–2.4)
MCHC RBC-ENTMCNC: 29.1 PG — SIGNIFICANT CHANGE UP (ref 27–31)
MCHC RBC-ENTMCNC: 32.6 G/DL — SIGNIFICANT CHANGE UP (ref 32–37)
MCV RBC AUTO: 89.1 FL — SIGNIFICANT CHANGE UP (ref 80–94)
MONOCYTES # BLD AUTO: 1.61 K/UL — HIGH (ref 0.1–0.6)
MONOCYTES NFR BLD AUTO: 10.2 % — HIGH (ref 1.7–9.3)
NEUTROPHILS # BLD AUTO: 12.36 K/UL — HIGH (ref 1.4–6.5)
NEUTROPHILS NFR BLD AUTO: 78.6 % — HIGH (ref 42.2–75.2)
NRBC # BLD: 0 /100 WBCS — SIGNIFICANT CHANGE UP (ref 0–0)
PLATELET # BLD AUTO: 404 K/UL — HIGH (ref 130–400)
PMV BLD: 11.1 FL — HIGH (ref 7.4–10.4)
POTASSIUM SERPL-MCNC: 3.9 MMOL/L — SIGNIFICANT CHANGE UP (ref 3.5–5)
POTASSIUM SERPL-SCNC: 3.9 MMOL/L — SIGNIFICANT CHANGE UP (ref 3.5–5)
PROT SERPL-MCNC: 6.2 G/DL — SIGNIFICANT CHANGE UP (ref 6–8)
RBC # BLD: 5.16 M/UL — SIGNIFICANT CHANGE UP (ref 4.7–6.1)
RBC # FLD: 13.1 % — SIGNIFICANT CHANGE UP (ref 11.5–14.5)
SODIUM SERPL-SCNC: 138 MMOL/L — SIGNIFICANT CHANGE UP (ref 135–146)
WBC # BLD: 15.72 K/UL — HIGH (ref 4.8–10.8)
WBC # FLD AUTO: 15.72 K/UL — HIGH (ref 4.8–10.8)

## 2024-12-30 PROCEDURE — 99233 SBSQ HOSP IP/OBS HIGH 50: CPT

## 2024-12-30 PROCEDURE — 99232 SBSQ HOSP IP/OBS MODERATE 35: CPT

## 2024-12-30 RX ORDER — METOPROLOL TARTRATE 50 MG
150 TABLET ORAL
Refills: 0 | Status: DISCONTINUED | OUTPATIENT
Start: 2024-12-30 | End: 2025-01-02

## 2024-12-30 RX ORDER — METOPROLOL TARTRATE 50 MG
50 TABLET ORAL ONCE
Refills: 0 | Status: DISCONTINUED | OUTPATIENT
Start: 2024-12-30 | End: 2024-12-30

## 2024-12-30 RX ADMIN — CLOPIDOGREL BISULFATE 75 MILLIGRAM(S): 75 TABLET, FILM COATED ORAL at 11:53

## 2024-12-30 RX ADMIN — Medication 150 MILLIGRAM(S): at 20:15

## 2024-12-30 RX ADMIN — ATORVASTATIN CALCIUM 40 MILLIGRAM(S): 40 TABLET, FILM COATED ORAL at 21:51

## 2024-12-30 RX ADMIN — Medication 2: at 11:52

## 2024-12-30 RX ADMIN — Medication 2: at 16:45

## 2024-12-30 RX ADMIN — SACUBITRIL AND VALSARTAN 1 TABLET(S): 24; 26 TABLET, FILM COATED ORAL at 05:20

## 2024-12-30 RX ADMIN — APIXABAN 5 MILLIGRAM(S): 5 TABLET, FILM COATED ORAL at 05:21

## 2024-12-30 RX ADMIN — APIXABAN 5 MILLIGRAM(S): 5 TABLET, FILM COATED ORAL at 17:34

## 2024-12-30 RX ADMIN — Medication 40 MILLIGRAM(S): at 13:44

## 2024-12-30 RX ADMIN — AMIODARONE HYDROCHLORIDE 200 MILLIGRAM(S): 200 TABLET ORAL at 05:21

## 2024-12-30 RX ADMIN — CEFTRIAXONE SODIUM 100 MILLIGRAM(S): 1 INJECTION, POWDER, FOR SOLUTION INTRAMUSCULAR; INTRAVENOUS at 11:55

## 2024-12-30 RX ADMIN — SACUBITRIL AND VALSARTAN 1 TABLET(S): 24; 26 TABLET, FILM COATED ORAL at 17:34

## 2024-12-30 RX ADMIN — AZITHROMYCIN MONOHYDRATE 255 MILLIGRAM(S): 200 POWDER, FOR SUSPENSION ORAL at 12:35

## 2024-12-30 RX ADMIN — FENOFIBRATE 145 MILLIGRAM(S): 48 TABLET ORAL at 11:54

## 2024-12-30 RX ADMIN — Medication 200 MILLIGRAM(S): at 05:19

## 2024-12-30 RX ADMIN — Medication 1: at 08:07

## 2024-12-30 RX ADMIN — Medication 40 MILLIGRAM(S): at 05:20

## 2024-12-30 RX ADMIN — EZETIMIBE 10 MILLIGRAM(S): 10 TABLET ORAL at 11:54

## 2024-12-30 NOTE — PROGRESS NOTE ADULT - ASSESSMENT
69-year-old male with past medical history of chronic HFrEF s/p ICD, AVR porcine, HTN, PAF s/p ablation, CAD s/p PCI, DM, HLD, COPD (no home O2), smoker presenting with worsening shortness of breath and weakness with concern for acute on chronic CHF exacerbation    Vitals: afebrile 98.1/HR / /88/ RR 18/ SAO2 97% on 3L O2  Labs: WBC 15k>14k Hb 15 plts 364 Na 142 K 4.3 BUN 22 with Cr 1.3  troponin 56>52>49 lactate 2.1 BNP 5279  EKG: ventricular paced rhythm      #acute on chronic CHF exacerbation in HFrEF  - continue on telemetry as patient is having frequent PVCs currently  - TTE: (10/15/24):  1. Moderately decreased global left ventricular systolic function. Left   ventricular ejection fraction, by visual estimation, is 30 to 35%.   2. Mildly enlarged right ventricle. Moderately reduced RV systolic   function.   3. Moderate to severe left atrial enlargement.   4. Bioprosthetic aortic valve well seated with adequate expanstion.   Normal opening. No paravalvular leak.  - no indication to repeat TTE  - BNP 5279  - cardio recommended to increase IV lasix 40mg from qd to bid until euvolemic with monitoring of BUN/Cr and Na  - resumed home entresto as K has normalized  - low sodium diet <2g  - continue amiodarone 200mg qd and metoprolol succinate 200mg qd (home doses)  - cardio recs appreciated  - correct electrolytes  -Daily wt, I&O    #PNA  - b/l lung opacities noted on CXR with a leukocytosis of 15k with no fever  - can continue azithromycin with ceftriaxone for now      #Paroxysmal Afib (on Eliquis)  -C/w eliquis, amiodarone, toprol    #CAD  -C/w eliquis, plavix, and lipitor    #DM  -ISS inpatient   -Hold metformin in hospital    #COPD (no home O2)    #Hyperlipidemia  -C/w tricor, zetia, lipitor    Diet: CC/DASH  VTE ppx: C/w Eliquis   Activity: Ambulate with assistance

## 2024-12-30 NOTE — PROGRESS NOTE ADULT - SUBJECTIVE AND OBJECTIVE BOX
Patient is a 69y old  Male who presents with a chief complaint of CHF exacerbation (27 Dec 2024 12:53)      SUBJECTIVE / OVERNIGHT EVENTS: Pt is still volume overloaded and requiring 3L of O2. Normally is on room air and states he was supposed to get home O2 but has not received it yet. Urinating well  ADDITIONAL REVIEW OF SYSTEMS: as above    MEDICATIONS  (STANDING):  aMIOdarone    Tablet 200 milliGRAM(s) Oral daily  apixaban 5 milliGRAM(s) Oral every 12 hours  atorvastatin 40 milliGRAM(s) Oral at bedtime  azithromycin  IVPB      azithromycin  IVPB 500 milliGRAM(s) IV Intermittent every 24 hours  cefTRIAXone   IVPB 1000 milliGRAM(s) IV Intermittent every 24 hours  chlorhexidine 2% Cloths 1 Application(s) Topical <User Schedule>  clopidogrel Tablet 75 milliGRAM(s) Oral daily  dextrose 5%. 1000 milliLiter(s) (50 mL/Hr) IV Continuous <Continuous>  ezetimibe 10 milliGRAM(s) Oral daily  fenofibrate Tablet 145 milliGRAM(s) Oral daily  furosemide   Injectable 40 milliGRAM(s) IV Push daily  insulin lispro (ADMELOG) corrective regimen sliding scale   SubCutaneous three times a day before meals  metoprolol succinate  milliGRAM(s) Oral daily    MEDICATIONS  (PRN):  acetaminophen     Tablet .. 650 milliGRAM(s) Oral every 6 hours PRN Temp greater or equal to 38C (100.4F), Mild Pain (1 - 3)  aluminum hydroxide/magnesium hydroxide/simethicone Suspension 30 milliLiter(s) Oral every 4 hours PRN Dyspepsia  dextrose Oral Gel 15 Gram(s) Oral once PRN Blood Glucose LESS THAN 70 milliGRAM(s)/deciliter  melatonin 3 milliGRAM(s) Oral at bedtime PRN Insomnia  ondansetron Injectable 4 milliGRAM(s) IV Push every 8 hours PRN Nausea and/or Vomiting  zolpidem 5 milliGRAM(s) Oral at bedtime PRN Insomnia      CAPILLARY BLOOD GLUCOSE      POCT Blood Glucose.: 193 mg/dL (28 Dec 2024 11:45)  POCT Blood Glucose.: 75 mg/dL (28 Dec 2024 07:51)  POCT Blood Glucose.: 158 mg/dL (27 Dec 2024 20:58)  POCT Blood Glucose.: 109 mg/dL (27 Dec 2024 16:38)    I&O's Summary    ECG:  < from: 12 Lead ECG (12.27.24 @ 06:18) >   Ventricular-paced rhythm  Biventricular pacemaker detected  Abnormal ECG    < from: Transesophageal Echocardiogram (10.15.24 @ 12:18) >    Summary:   1. Moderately decreased global left ventricular systolic function. Left   ventricular ejection fraction, by visual estimation, is 30 to 35%.   2. Mildly enlarged right ventricle. Moderately reduced RV systolic   function.   3. Moderate to severe left atrial enlargement.   4. Bioprosthetic aortic valve well seated with adequate expanstion.   Normal opening. No paravalvular leak.   5. Mild mitral valve regurgitation.   6. Mild tricuspid regurgitation.   7. No left atrial appendage thrombus and decreased left atrial appendage   velocities.   8. After probe removal patient underwent successful synchronized   cardioversion with 200J.    PHYSICAL EXAM:  Vital Signs Last 24 Hrs  T(C): 36.7 (28 Dec 2024 04:03), Max: 36.7 (28 Dec 2024 04:03)  T(F): 98.1 (28 Dec 2024 04:03), Max: 98.1 (28 Dec 2024 04:03)  HR: 103 (28 Dec 2024 04:03) (89 - 103)  BP: 135/88 (28 Dec 2024 04:03) (127/84 - 137/76)  BP(mean): --  RR: 18 (28 Dec 2024 04:03) (18 - 18)  SpO2: 97% (28 Dec 2024 04:03) (95% - 100%)    Parameters below as of 28 Dec 2024 00:32  Patient On (Oxygen Delivery Method): nasal cannula      CONSTITUTIONAL: NAD, chronically ill  EYES: PERRLA; conjunctiva and sclera clear  ENMT: Moist oral mucosa, no pharyngeal injection or exudates; normal dentition  NECK: Supple, no palpable masses; no thyromegaly  RESPIRATORY: Normal respiratory effort; decreased breath sounds in b/l lungs  CARDIOVASCULAR: Regular rate and rhythm, normal S1 and S2, no murmur/rub/gallop; 1+ pitting edema in b/l LE  ABDOMEN: Nontender to palpation, normoactive bowel sounds, no rebound/guarding; No hepatosplenomegaly  MUSCULOSKELETAL: no clubbing or cyanosis of digits; no joint swelling or tenderness to palpation  PSYCH: A+O to person, place, and time; affect appropriate  SKIN: No rashes; no palpable lesions                            15.0   15.72 )-----------( 404      ( 30 Dec 2024 08:02 )             46.0       12-30    138  |  99  |  25[H]  ----------------------------<  132[H]  3.9   |  28  |  1.2    Ca    9.4      30 Dec 2024 08:02  Mg     2.1     12-30    TPro  6.2  /  Alb  3.6  /  TBili  0.8  /  DBili  x   /  AST  20  /  ALT  21  /  AlkPhos  107  12-30              Urinalysis Basic - ( 30 Dec 2024 08:02 )    Color: x / Appearance: x / SG: x / pH: x  Gluc: 132 mg/dL / Ketone: x  / Bili: x / Urobili: x   Blood: x / Protein: x / Nitrite: x   Leuk Esterase: x / RBC: x / WBC x   Sq Epi: x / Non Sq Epi: x / Bacteria: x            Lactate Trend            CAPILLARY BLOOD GLUCOSE      POCT Blood Glucose.: 202 mg/dL (30 Dec 2024 11:06)                       Color: x / Appearance: x / SG: x / pH: x  Gluc: 92 mg/dL / Ketone: x  / Bili: x / Urobili: x   Blood: x / Protein: x / Nitrite: x   Leuk Esterase: x / RBC: x / WBC x   Sq Epi: x / Non Sq Epi: x / Bacteria: x            Lactate Trend            CAPILLARY BLOOD GLUCOSE      POCT Blood Glucose.: 99 mg/dL (29 Dec 2024 11:49)          Urinalysis Basic - ( 28 Dec 2024 07:18 )    Color: x / Appearance: x / SG: x / pH: x  Gluc: 71 mg/dL / Ketone: x  / Bili: x / Urobili: x   Blood: x / Protein: x / Nitrite: x   Leuk Esterase: x / RBC: x / WBC x   Sq Epi: x / Non Sq Epi: x / Bacteria: x          RADIOLOGY & ADDITIONAL TESTS:  Results Reviewed:   Imaging Personally Reviewed:  Electrocardiogram Personally Reviewed:    COORDINATION OF CARE:  Care Discussed with Consultants/Other Providers [Y/N]:  Prior or Outpatient Records Reviewed [Y/N]:

## 2024-12-30 NOTE — PROGRESS NOTE ADULT - ASSESSMENT
#Persistent AF  #ICM s/p CRT-D  #Bioprosthetic AV  #Acute CHF exacerbation    Recommendations:  Telemetry  Continue IV lasix, monitor I/O, BUN/Cr, CXR   Continue metoprolol titrate as tolerated  Continue amiodarone 200 mg q24h  Continue uninterrupted Eliquis 5 mg q12h  Doubt PNA (no URI, no fever, no consolidation)  Will re-evaluate in 24-48h of IV diuresis if optimized for the scheduled AF ablation on 1/2/2024

## 2024-12-30 NOTE — PROGRESS NOTE ADULT - SUBJECTIVE AND OBJECTIVE BOX
Patient is a 69y old  Male who presents with a chief complaint of CHF exacerbation (30 Dec 2024 12:46)      Subjective:  Patient seen and examined at bedside.  Remains on supplemental O2 (3L)            Medications:  acetaminophen     Tablet .. 650 milliGRAM(s) Oral every 6 hours PRN  aluminum hydroxide/magnesium hydroxide/simethicone Suspension 30 milliLiter(s) Oral every 4 hours PRN  aMIOdarone    Tablet 200 milliGRAM(s) Oral daily  apixaban 5 milliGRAM(s) Oral every 12 hours  atorvastatin 40 milliGRAM(s) Oral at bedtime  azithromycin  IVPB      azithromycin  IVPB 500 milliGRAM(s) IV Intermittent every 24 hours  cefTRIAXone   IVPB 1000 milliGRAM(s) IV Intermittent every 24 hours  chlorhexidine 2% Cloths 1 Application(s) Topical <User Schedule>  clopidogrel Tablet 75 milliGRAM(s) Oral daily  dextrose 5%. 1000 milliLiter(s) IV Continuous <Continuous>  dextrose Oral Gel 15 Gram(s) Oral once PRN  ezetimibe 10 milliGRAM(s) Oral daily  fenofibrate Tablet 145 milliGRAM(s) Oral daily  furosemide   Injectable 40 milliGRAM(s) IV Push two times a day  insulin lispro (ADMELOG) corrective regimen sliding scale   SubCutaneous three times a day before meals  melatonin 3 milliGRAM(s) Oral at bedtime PRN  metoprolol tartrate 150 milliGRAM(s) Oral two times a day  ondansetron Injectable 4 milliGRAM(s) IV Push every 8 hours PRN  sacubitril 24 mG/valsartan 26 mG 1 Tablet(s) Oral two times a day  zolpidem 5 milliGRAM(s) Oral at bedtime PRN      PAST MEDICAL & SURGICAL HISTORY:  CHF (congestive heart failure)      Diabetes      CAD (coronary artery disease)      Chronic obstructive pulmonary disease (COPD)      HTN (hypertension)      Stented coronary artery      Dyslipidemia      GERD (gastroesophageal reflux disease)      Afib      CVA (cerebral vascular accident)      H/O heart artery stent      Heart valve replaced  aortic      History of Nissen fundoplication          Objective:  Vitals:  T(F): 98.4 (12-30), Max: 99 (12-29)  HR: 80 (12-30) (80 - 104)  BP: 101/61 (12-30) (101/61 - 139/77)  RR: 18 (12-30)  SpO2: 94% (12-30)  I&O's Summary    29 Dec 2024 07:01  -  30 Dec 2024 07:00  --------------------------------------------------------  IN: 210 mL / OUT: 2600 mL / NET: -2390 mL    30 Dec 2024 07:01  -  30 Dec 2024 16:55  --------------------------------------------------------  IN: 1157 mL / OUT: 600 mL / NET: 557 mL        Physical Exam:  Appearance: No acute distress; well appearing  Eyes: PERRL, EOMI, pink conjunctiva  HENT: Normal oral muscosa  Cardiovascular: Irregular, S1S2 no murmurs, rubs, or gallops; no edema; no JVD  Respiratory: bibasilar crackles  Gastrointestinal: soft, non-tender, non-distended with normal bowel sounds  Musculoskeletal: No clubbing; no joint deformity   Neurologic: Non-focal  Lymphatic: No lymphadenopathy  Psychiatry: AAOx3, mood & affect appropriate  Skin: No rashes, ecchymoses, or cyanosis                          15.0   15.72 )-----------( 404      ( 30 Dec 2024 08:02 )             46.0     12-30    138  |  99  |  25[H]  ----------------------------<  132[H]  3.9   |  28  |  1.2    Ca    9.4      30 Dec 2024 08:02  Mg     2.1     12-30    TPro  6.2  /  Alb  3.6  /  TBili  0.8  /  DBili  x   /  AST  20  /  ALT  21  /  AlkPhos  107  12-30        New ECG(s): Personally reviewed    Echo:  < from: TTE Echo Complete w/o Contrast w/ Doppler (12.27.24 @ 12:57) >  Summary:   1. Left ventricular ejection fraction, by visual estimation, is 40 to   45%.   2. Mildly enlarged left atrium.   3. Mild mitral annular calcification.   4. Mild mitral valve regurgitation.   5. Mild tricuspid regurgitation.   6. Bioprosthesis in the aortic position.   7. Ascending aorta 3.7 cm.   8. Estimated pulmonary artery systolic pressure is 35.7 mmHg assuming a   right atrial pressure of 3 mmHg, which is consistent with borderline   pulmonary hypertension.    < end of copied text >      Stress Testing:     Cath:    Imaging:    Interpretation of Telemetry:

## 2024-12-31 ENCOUNTER — TRANSCRIPTION ENCOUNTER (OUTPATIENT)
Age: 69
End: 2024-12-31

## 2024-12-31 LAB
ALBUMIN SERPL ELPH-MCNC: 3.1 G/DL — LOW (ref 3.5–5.2)
ALBUMIN SERPL ELPH-MCNC: 3.2 G/DL — LOW (ref 3.5–5.2)
ALP SERPL-CCNC: 89 U/L — SIGNIFICANT CHANGE UP (ref 30–115)
ALP SERPL-CCNC: 97 U/L — SIGNIFICANT CHANGE UP (ref 30–115)
ALT FLD-CCNC: 22 U/L — SIGNIFICANT CHANGE UP (ref 0–41)
ALT FLD-CCNC: 37 U/L — SIGNIFICANT CHANGE UP (ref 0–41)
ANION GAP SERPL CALC-SCNC: 15 MMOL/L — HIGH (ref 7–14)
ANION GAP SERPL CALC-SCNC: 16 MMOL/L — HIGH (ref 7–14)
ANION GAP SERPL CALC-SCNC: 17 MMOL/L — HIGH (ref 7–14)
APTT BLD: 37.5 SEC — SIGNIFICANT CHANGE UP (ref 27–39.2)
APTT BLD: 40.9 SEC — HIGH (ref 27–39.2)
AST SERPL-CCNC: 27 U/L — SIGNIFICANT CHANGE UP (ref 0–41)
AST SERPL-CCNC: 87 U/L — HIGH (ref 0–41)
BASOPHILS # BLD AUTO: 0.07 K/UL — SIGNIFICANT CHANGE UP (ref 0–0.2)
BASOPHILS # BLD AUTO: 0.09 K/UL — SIGNIFICANT CHANGE UP (ref 0–0.2)
BASOPHILS NFR BLD AUTO: 0.4 % — SIGNIFICANT CHANGE UP (ref 0–1)
BASOPHILS NFR BLD AUTO: 0.6 % — SIGNIFICANT CHANGE UP (ref 0–1)
BILIRUB SERPL-MCNC: 0.8 MG/DL — SIGNIFICANT CHANGE UP (ref 0.2–1.2)
BILIRUB SERPL-MCNC: 1.3 MG/DL — HIGH (ref 0.2–1.2)
BUN SERPL-MCNC: 25 MG/DL — HIGH (ref 10–20)
BUN SERPL-MCNC: 37 MG/DL — HIGH (ref 10–20)
BUN SERPL-MCNC: 42 MG/DL — HIGH (ref 10–20)
CALCIUM SERPL-MCNC: 8 MG/DL — LOW (ref 8.4–10.5)
CALCIUM SERPL-MCNC: 8.8 MG/DL — SIGNIFICANT CHANGE UP (ref 8.4–10.5)
CALCIUM SERPL-MCNC: 8.9 MG/DL — SIGNIFICANT CHANGE UP (ref 8.4–10.5)
CHLORIDE SERPL-SCNC: 100 MMOL/L — SIGNIFICANT CHANGE UP (ref 98–110)
CHLORIDE SERPL-SCNC: 97 MMOL/L — LOW (ref 98–110)
CHLORIDE SERPL-SCNC: 98 MMOL/L — SIGNIFICANT CHANGE UP (ref 98–110)
CO2 SERPL-SCNC: 20 MMOL/L — SIGNIFICANT CHANGE UP (ref 17–32)
CO2 SERPL-SCNC: 20 MMOL/L — SIGNIFICANT CHANGE UP (ref 17–32)
CO2 SERPL-SCNC: 23 MMOL/L — SIGNIFICANT CHANGE UP (ref 17–32)
CREAT SERPL-MCNC: 1.1 MG/DL — SIGNIFICANT CHANGE UP (ref 0.7–1.5)
CREAT SERPL-MCNC: 1.7 MG/DL — HIGH (ref 0.7–1.5)
CREAT SERPL-MCNC: 2 MG/DL — HIGH (ref 0.7–1.5)
EGFR: 35 ML/MIN/1.73M2 — LOW
EGFR: 43 ML/MIN/1.73M2 — LOW
EGFR: 73 ML/MIN/1.73M2 — SIGNIFICANT CHANGE UP
EOSINOPHIL # BLD AUTO: 0.01 K/UL — SIGNIFICANT CHANGE UP (ref 0–0.7)
EOSINOPHIL # BLD AUTO: 0.09 K/UL — SIGNIFICANT CHANGE UP (ref 0–0.7)
EOSINOPHIL NFR BLD AUTO: 0.1 % — SIGNIFICANT CHANGE UP (ref 0–8)
EOSINOPHIL NFR BLD AUTO: 0.6 % — SIGNIFICANT CHANGE UP (ref 0–8)
GAS PNL BLDA: SIGNIFICANT CHANGE UP
GAS PNL BLDA: SIGNIFICANT CHANGE UP
GLUCOSE SERPL-MCNC: 185 MG/DL — HIGH (ref 70–99)
GLUCOSE SERPL-MCNC: 222 MG/DL — HIGH (ref 70–99)
GLUCOSE SERPL-MCNC: 226 MG/DL — HIGH (ref 70–99)
HCT VFR BLD CALC: 46.2 % — SIGNIFICANT CHANGE UP (ref 42–52)
HCT VFR BLD CALC: 50.3 % — SIGNIFICANT CHANGE UP (ref 42–52)
HGB BLD-MCNC: 15.2 G/DL — SIGNIFICANT CHANGE UP (ref 14–18)
HGB BLD-MCNC: 16.4 G/DL — SIGNIFICANT CHANGE UP (ref 14–18)
IMM GRANULOCYTES NFR BLD AUTO: 0.5 % — HIGH (ref 0.1–0.3)
IMM GRANULOCYTES NFR BLD AUTO: 0.6 % — HIGH (ref 0.1–0.3)
INR BLD: 2.51 RATIO — HIGH (ref 0.65–1.3)
INR BLD: 2.67 RATIO — HIGH (ref 0.65–1.3)
LACTATE SERPL-SCNC: 2.3 MMOL/L — HIGH (ref 0.7–2)
LYMPHOCYTES # BLD AUTO: 0.6 K/UL — LOW (ref 1.2–3.4)
LYMPHOCYTES # BLD AUTO: 0.79 K/UL — LOW (ref 1.2–3.4)
LYMPHOCYTES # BLD AUTO: 4.1 % — LOW (ref 20.5–51.1)
LYMPHOCYTES # BLD AUTO: 4.9 % — LOW (ref 20.5–51.1)
MAGNESIUM SERPL-MCNC: 2 MG/DL — SIGNIFICANT CHANGE UP (ref 1.8–2.4)
MCHC RBC-ENTMCNC: 28.9 PG — SIGNIFICANT CHANGE UP (ref 27–31)
MCHC RBC-ENTMCNC: 29.3 PG — SIGNIFICANT CHANGE UP (ref 27–31)
MCHC RBC-ENTMCNC: 32.6 G/DL — SIGNIFICANT CHANGE UP (ref 32–37)
MCHC RBC-ENTMCNC: 32.9 G/DL — SIGNIFICANT CHANGE UP (ref 32–37)
MCV RBC AUTO: 88.6 FL — SIGNIFICANT CHANGE UP (ref 80–94)
MCV RBC AUTO: 89 FL — SIGNIFICANT CHANGE UP (ref 80–94)
MONOCYTES # BLD AUTO: 0.63 K/UL — HIGH (ref 0.1–0.6)
MONOCYTES # BLD AUTO: 1.13 K/UL — HIGH (ref 0.1–0.6)
MONOCYTES NFR BLD AUTO: 4.3 % — SIGNIFICANT CHANGE UP (ref 1.7–9.3)
MONOCYTES NFR BLD AUTO: 7 % — SIGNIFICANT CHANGE UP (ref 1.7–9.3)
NEUTROPHILS # BLD AUTO: 13.38 K/UL — HIGH (ref 1.4–6.5)
NEUTROPHILS # BLD AUTO: 14.05 K/UL — HIGH (ref 1.4–6.5)
NEUTROPHILS NFR BLD AUTO: 86.5 % — HIGH (ref 42.2–75.2)
NEUTROPHILS NFR BLD AUTO: 90.4 % — HIGH (ref 42.2–75.2)
NRBC # BLD: 0 /100 WBCS — SIGNIFICANT CHANGE UP (ref 0–0)
NRBC # BLD: 0 /100 WBCS — SIGNIFICANT CHANGE UP (ref 0–0)
NT-PROBNP SERPL-SCNC: 4712 PG/ML — HIGH (ref 0–300)
PLATELET # BLD AUTO: 451 K/UL — HIGH (ref 130–400)
PLATELET # BLD AUTO: 529 K/UL — HIGH (ref 130–400)
PMV BLD: 10.6 FL — HIGH (ref 7.4–10.4)
PMV BLD: 11.1 FL — HIGH (ref 7.4–10.4)
POTASSIUM SERPL-MCNC: 4.1 MMOL/L — SIGNIFICANT CHANGE UP (ref 3.5–5)
POTASSIUM SERPL-MCNC: 5.1 MMOL/L — HIGH (ref 3.5–5)
POTASSIUM SERPL-MCNC: 6.4 MMOL/L — CRITICAL HIGH (ref 3.5–5)
POTASSIUM SERPL-SCNC: 4.1 MMOL/L — SIGNIFICANT CHANGE UP (ref 3.5–5)
POTASSIUM SERPL-SCNC: 5.1 MMOL/L — HIGH (ref 3.5–5)
POTASSIUM SERPL-SCNC: 6.4 MMOL/L — CRITICAL HIGH (ref 3.5–5)
PROT SERPL-MCNC: 5.2 G/DL — LOW (ref 6–8)
PROT SERPL-MCNC: 6.2 G/DL — SIGNIFICANT CHANGE UP (ref 6–8)
PROTHROM AB SERPL-ACNC: 30.2 SEC — HIGH (ref 9.95–12.87)
PROTHROM AB SERPL-ACNC: 32.2 SEC — HIGH (ref 9.95–12.87)
RBC # BLD: 5.19 M/UL — SIGNIFICANT CHANGE UP (ref 4.7–6.1)
RBC # BLD: 5.68 M/UL — SIGNIFICANT CHANGE UP (ref 4.7–6.1)
RBC # FLD: 13 % — SIGNIFICANT CHANGE UP (ref 11.5–14.5)
RBC # FLD: 13.1 % — SIGNIFICANT CHANGE UP (ref 11.5–14.5)
SODIUM SERPL-SCNC: 135 MMOL/L — SIGNIFICANT CHANGE UP (ref 135–146)
SODIUM SERPL-SCNC: 135 MMOL/L — SIGNIFICANT CHANGE UP (ref 135–146)
SODIUM SERPL-SCNC: 136 MMOL/L — SIGNIFICANT CHANGE UP (ref 135–146)
TROPONIN T, HIGH SENSITIVITY RESULT: 40 NG/L — HIGH (ref 6–21)
TROPONIN T, HIGH SENSITIVITY RESULT: 42 NG/L — HIGH (ref 6–21)
WBC # BLD: 14.78 K/UL — HIGH (ref 4.8–10.8)
WBC # BLD: 16.23 K/UL — HIGH (ref 4.8–10.8)
WBC # FLD AUTO: 14.78 K/UL — HIGH (ref 4.8–10.8)
WBC # FLD AUTO: 16.23 K/UL — HIGH (ref 4.8–10.8)

## 2024-12-31 PROCEDURE — G0408: CPT | Mod: 93

## 2024-12-31 PROCEDURE — 71045 X-RAY EXAM CHEST 1 VIEW: CPT | Mod: 26,76

## 2024-12-31 PROCEDURE — 70450 CT HEAD/BRAIN W/O DYE: CPT | Mod: 26

## 2024-12-31 PROCEDURE — 93010 ELECTROCARDIOGRAM REPORT: CPT

## 2024-12-31 PROCEDURE — 99291 CRITICAL CARE FIRST HOUR: CPT

## 2024-12-31 PROCEDURE — 99233 SBSQ HOSP IP/OBS HIGH 50: CPT

## 2024-12-31 PROCEDURE — 71045 X-RAY EXAM CHEST 1 VIEW: CPT | Mod: 26,77

## 2024-12-31 RX ORDER — FENTANYL 75 UG/H
0.5 PATCH, EXTENDED RELEASE TRANSDERMAL
Qty: 5000 | Refills: 0 | Status: DISCONTINUED | OUTPATIENT
Start: 2024-12-31 | End: 2024-12-31

## 2024-12-31 RX ORDER — ACETAMINOPHEN 80 MG/.8ML
650 SOLUTION/ DROPS ORAL ONCE
Refills: 0 | Status: COMPLETED | OUTPATIENT
Start: 2024-12-31 | End: 2024-12-31

## 2024-12-31 RX ORDER — METOPROLOL TARTRATE 50 MG
5 TABLET ORAL ONCE
Refills: 0 | Status: COMPLETED | OUTPATIENT
Start: 2024-12-31 | End: 2024-12-31

## 2024-12-31 RX ORDER — MORPHINE SULFATE 15 MG
2 TABLET, EXTENDED RELEASE ORAL ONCE
Refills: 0 | Status: DISCONTINUED | OUTPATIENT
Start: 2024-12-31 | End: 2024-12-31

## 2024-12-31 RX ORDER — MAGNESIUM SULFATE 500 MG/ML
2 INJECTION, SOLUTION INTRAMUSCULAR; INTRAVENOUS ONCE
Refills: 0 | Status: COMPLETED | OUTPATIENT
Start: 2024-12-31 | End: 2024-12-31

## 2024-12-31 RX ORDER — ACETAMINOPHEN 80 MG/.8ML
1000 SOLUTION/ DROPS ORAL ONCE
Refills: 0 | Status: COMPLETED | OUTPATIENT
Start: 2024-12-31 | End: 2024-12-31

## 2024-12-31 RX ORDER — CHLORHEXIDINE GLUCONATE 1.2 MG/ML
15 RINSE ORAL EVERY 12 HOURS
Refills: 0 | Status: DISCONTINUED | OUTPATIENT
Start: 2024-12-31 | End: 2025-01-04

## 2024-12-31 RX ORDER — IPRATROPIUM BROMIDE AND ALBUTEROL SULFATE .5; 2.5 MG/3ML; MG/3ML
3 SOLUTION RESPIRATORY (INHALATION) EVERY 6 HOURS
Refills: 0 | Status: DISCONTINUED | OUTPATIENT
Start: 2024-12-31 | End: 2025-01-16

## 2024-12-31 RX ORDER — BUMETANIDE 2 MG/1
2 TABLET ORAL ONCE
Refills: 0 | Status: COMPLETED | OUTPATIENT
Start: 2024-12-31 | End: 2024-12-31

## 2024-12-31 RX ORDER — NOREPINEPHRINE BITARTRATE 1 MG/ML
0.05 INJECTION INTRAVENOUS
Qty: 8 | Refills: 0 | Status: DISCONTINUED | OUTPATIENT
Start: 2024-12-31 | End: 2025-01-05

## 2024-12-31 RX ORDER — BUMETANIDE 2 MG/1
2 TABLET ORAL EVERY 12 HOURS
Refills: 0 | Status: DISCONTINUED | OUTPATIENT
Start: 2024-12-31 | End: 2025-01-03

## 2024-12-31 RX ORDER — MORPHINE SULFATE 15 MG
1 TABLET, EXTENDED RELEASE ORAL ONCE
Refills: 0 | Status: DISCONTINUED | OUTPATIENT
Start: 2024-12-31 | End: 2024-12-31

## 2024-12-31 RX ORDER — FENTANYL 75 UG/H
0.5 PATCH, EXTENDED RELEASE TRANSDERMAL
Qty: 2500 | Refills: 0 | Status: DISCONTINUED | OUTPATIENT
Start: 2024-12-31 | End: 2025-01-02

## 2024-12-31 RX ORDER — VANCOMYCIN HYDROCHLORIDE 5 G/100ML
1250 INJECTION, POWDER, LYOPHILIZED, FOR SOLUTION INTRAVENOUS ONCE
Refills: 0 | Status: COMPLETED | OUTPATIENT
Start: 2024-12-31 | End: 2024-12-31

## 2024-12-31 RX ORDER — VANCOMYCIN HYDROCHLORIDE 5 G/100ML
1250 INJECTION, POWDER, LYOPHILIZED, FOR SOLUTION INTRAVENOUS EVERY 12 HOURS
Refills: 0 | Status: DISCONTINUED | OUTPATIENT
Start: 2025-01-01 | End: 2025-01-01

## 2024-12-31 RX ORDER — LORAZEPAM 1 MG/1
2 TABLET ORAL ONCE
Refills: 0 | Status: DISCONTINUED | OUTPATIENT
Start: 2024-12-31 | End: 2024-12-31

## 2024-12-31 RX ORDER — BUMETANIDE 2 MG/1
2 TABLET ORAL ONCE
Refills: 0 | Status: DISCONTINUED | OUTPATIENT
Start: 2024-12-31 | End: 2025-01-04

## 2024-12-31 RX ORDER — VANCOMYCIN HYDROCHLORIDE 5 G/100ML
INJECTION, POWDER, LYOPHILIZED, FOR SOLUTION INTRAVENOUS
Refills: 0 | Status: DISCONTINUED | OUTPATIENT
Start: 2024-12-31 | End: 2025-01-01

## 2024-12-31 RX ORDER — DEXMEDETOMIDINE HYDROCHLORIDE 4 UG/ML
0.2 INJECTION, SOLUTION INTRAVENOUS
Qty: 400 | Refills: 0 | Status: DISCONTINUED | OUTPATIENT
Start: 2024-12-31 | End: 2025-01-03

## 2024-12-31 RX ADMIN — Medication 150 MILLIGRAM(S): at 05:01

## 2024-12-31 RX ADMIN — CHLORHEXIDINE GLUCONATE 1 APPLICATION(S): 1.2 RINSE ORAL at 05:00

## 2024-12-31 RX ADMIN — AMIODARONE HYDROCHLORIDE 200 MILLIGRAM(S): 200 TABLET ORAL at 04:59

## 2024-12-31 RX ADMIN — Medication 1 MILLIGRAM(S): at 04:17

## 2024-12-31 RX ADMIN — Medication 100 MILLIGRAM(S): at 15:17

## 2024-12-31 RX ADMIN — ACETAMINOPHEN 650 MILLIGRAM(S): 80 SOLUTION/ DROPS ORAL at 16:00

## 2024-12-31 RX ADMIN — ACETAMINOPHEN 650 MILLIGRAM(S): 80 SOLUTION/ DROPS ORAL at 02:21

## 2024-12-31 RX ADMIN — Medication 40 MILLIGRAM(S): at 03:36

## 2024-12-31 RX ADMIN — ACETAMINOPHEN 650 MILLIGRAM(S): 80 SOLUTION/ DROPS ORAL at 02:55

## 2024-12-31 RX ADMIN — NOREPINEPHRINE BITARTRATE 7.7 MICROGRAM(S)/KG/MIN: 1 INJECTION INTRAVENOUS at 14:32

## 2024-12-31 RX ADMIN — ATORVASTATIN CALCIUM 40 MILLIGRAM(S): 40 TABLET, FILM COATED ORAL at 21:47

## 2024-12-31 RX ADMIN — ACETAMINOPHEN 1000 MILLIGRAM(S): 80 SOLUTION/ DROPS ORAL at 18:22

## 2024-12-31 RX ADMIN — FENTANYL 4.11 MICROGRAM(S)/KG/HR: 75 PATCH, EXTENDED RELEASE TRANSDERMAL at 15:13

## 2024-12-31 RX ADMIN — LORAZEPAM 2 MILLIGRAM(S): 1 TABLET ORAL at 09:59

## 2024-12-31 RX ADMIN — Medication 2: at 08:00

## 2024-12-31 RX ADMIN — DEXMEDETOMIDINE HYDROCHLORIDE 4.11 MICROGRAM(S)/KG/HR: 4 INJECTION, SOLUTION INTRAVENOUS at 14:26

## 2024-12-31 RX ADMIN — MAGNESIUM SULFATE 25 GRAM(S): 500 INJECTION, SOLUTION INTRAMUSCULAR; INTRAVENOUS at 15:17

## 2024-12-31 RX ADMIN — ACETAMINOPHEN 400 MILLIGRAM(S): 80 SOLUTION/ DROPS ORAL at 17:19

## 2024-12-31 RX ADMIN — APIXABAN 5 MILLIGRAM(S): 5 TABLET, FILM COATED ORAL at 04:59

## 2024-12-31 RX ADMIN — ACETAMINOPHEN 650 MILLIGRAM(S): 80 SOLUTION/ DROPS ORAL at 15:16

## 2024-12-31 RX ADMIN — Medication 150 MILLIGRAM(S): at 21:46

## 2024-12-31 RX ADMIN — BUMETANIDE 2 MILLIGRAM(S): 2 TABLET ORAL at 20:18

## 2024-12-31 RX ADMIN — Medication 5 MILLIGRAM(S): at 04:10

## 2024-12-31 RX ADMIN — Medication 5 MILLIGRAM(S): at 02:21

## 2024-12-31 RX ADMIN — SACUBITRIL AND VALSARTAN 1 TABLET(S): 24; 26 TABLET, FILM COATED ORAL at 05:00

## 2024-12-31 RX ADMIN — Medication 25 MILLIGRAM(S): at 07:25

## 2024-12-31 RX ADMIN — VANCOMYCIN HYDROCHLORIDE 166.67 MILLIGRAM(S): 5 INJECTION, POWDER, LYOPHILIZED, FOR SOLUTION INTRAVENOUS at 17:30

## 2024-12-31 RX ADMIN — BUMETANIDE 2 MILLIGRAM(S): 2 TABLET ORAL at 13:14

## 2024-12-31 NOTE — DISCHARGE NOTE NURSING/CASE MANAGEMENT/SOCIAL WORK - NSDCPEEMAIL_GEN_ALL_CORE
[facility] email [email or web address]

## 2024-12-31 NOTE — DISCHARGE NOTE NURSING/CASE MANAGEMENT/SOCIAL WORK - PATIENT PORTAL LINK FT
You can access the FollowMyHealth Patient Portal offered by [facility] by registering at the following website: [email or web address]. By joining [facility]’s FollowMyHealth portal, you will also be able to view your health information using other applications (apps) compatible with our system.

## 2024-12-31 NOTE — PROGRESS NOTE ADULT - SUBJECTIVE AND OBJECTIVE BOX
INTERVAL HPI/OVERNIGHT EVENTS:  Pt with confusion, getting head CT. Went into flash pulmonary edema requiring intubation. Upgraded to critical care unit.       MEDICATIONS  (STANDING):  acetaminophen     Tablet .. 650 milliGRAM(s) Oral once  aMIOdarone    Tablet 200 milliGRAM(s) Oral daily  apixaban 5 milliGRAM(s) Oral every 12 hours  atorvastatin 40 milliGRAM(s) Oral at bedtime  buMETAnide Injectable 2 milliGRAM(s) IV Push once  cefepime   IVPB      cefepime   IVPB 2000 milliGRAM(s) IV Intermittent once  chlorhexidine 2% Cloths 1 Application(s) Topical <User Schedule>  clopidogrel Tablet 75 milliGRAM(s) Oral daily  dexMEDEtomidine Infusion 0.2 MICROgram(s)/kG/Hr (4.11 mL/Hr) IV Continuous <Continuous>  dextrose 5%. 1000 milliLiter(s) (50 mL/Hr) IV Continuous <Continuous>  ezetimibe 10 milliGRAM(s) Oral daily  fenofibrate Tablet 145 milliGRAM(s) Oral daily  fentaNYL   Infusion. 0.5 MICROgram(s)/kG/Hr (4.11 mL/Hr) IV Continuous <Continuous>  furosemide   Injectable 40 milliGRAM(s) IV Push two times a day  insulin lispro (ADMELOG) corrective regimen sliding scale   SubCutaneous three times a day before meals  magnesium sulfate  IVPB 2 Gram(s) IV Intermittent once  metoprolol tartrate 150 milliGRAM(s) Oral two times a day  norepinephrine Infusion 0.05 MICROgram(s)/kG/Min (7.7 mL/Hr) IV Continuous <Continuous>  sacubitril 24 mG/valsartan 26 mG 1 Tablet(s) Oral two times a day  vancomycin  IVPB      vancomycin  IVPB 1250 milliGRAM(s) IV Intermittent once    MEDICATIONS  (PRN):  acetaminophen     Tablet .. 650 milliGRAM(s) Oral every 6 hours PRN Temp greater or equal to 38C (100.4F), Mild Pain (1 - 3)  albuterol/ipratropium for Nebulization 3 milliLiter(s) Nebulizer every 6 hours PRN Shortness of Breath and/or Wheezing  aluminum hydroxide/magnesium hydroxide/simethicone Suspension 30 milliLiter(s) Oral every 4 hours PRN Dyspepsia  dextrose Oral Gel 15 Gram(s) Oral once PRN Blood Glucose LESS THAN 70 milliGRAM(s)/deciliter  hydrOXYzine hydrochloride 25 milliGRAM(s) Oral every 6 hours PRN Agitation  melatonin 3 milliGRAM(s) Oral at bedtime PRN Insomnia  ondansetron Injectable 4 milliGRAM(s) IV Push every 8 hours PRN Nausea and/or Vomiting  zolpidem 5 milliGRAM(s) Oral at bedtime PRN Insomnia      Allergies  sulfa drugs (Unknown)      REVIEW OF SYSTEMS    [ ] A ten-point review of systems was otherwise negative except as noted.  [x] Due to altered mental status/intubation, subjective information were not able to be obtained from the patient. History was obtained, to the extent possible, from review of the chart and collateral sources of information.      Vital Signs Last 24 Hrs  T(C): 39.9 (31 Dec 2024 15:02), Max: 39.9 (31 Dec 2024 15:02)  T(F): 103.8 (31 Dec 2024 15:02), Max: 103.8 (31 Dec 2024 15:02)  HR: 108 (31 Dec 2024 14:18) (72 - 138)  BP: 119/68 (31 Dec 2024 14:18) (117/75 - 141/68)  BP(mean): 87 (31 Dec 2024 14:18) (87 - 87)  RR: 8 (31 Dec 2024 14:18) (8 - 22)  SpO2: 100% (31 Dec 2024 14:18) (95% - 100%)    Parameters below as of 31 Dec 2024 14:12  Patient On (Oxygen Delivery Method): alexi        12-30-24 @ 07:01  -  12-31-24 @ 07:00  --------------------------------------------------------  IN: 1157 mL / OUT: 1300 mL / NET: -143 mL      Physical Exam  n/a  done at remote location    LABS:                        15.2   16.23 )-----------( 451      ( 31 Dec 2024 03:47 )             46.2     12-31    136  |  100  |  25[H]  ----------------------------<  226[H]  4.1   |  20  |  1.1    Ca    8.0[L]      31 Dec 2024 03:47  Mg     2.1     12-30    TPro  5.2[L]  /  Alb  3.1[L]  /  TBili  0.8  /  DBili  x   /  AST  27  /  ALT  22  /  AlkPhos  89  12-31    PT/INR - ( 31 Dec 2024 04:21 )   PT: 30.20 sec;   INR: 2.51 ratio         PTT - ( 31 Dec 2024 04:21 )  PTT:40.9 sec  Urinalysis Basic - ( 31 Dec 2024 03:47 )    Color: x / Appearance: x / SG: x / pH: x  Gluc: 226 mg/dL / Ketone: x  / Bili: x / Urobili: x   Blood: x / Protein: x / Nitrite: x   Leuk Esterase: x / RBC: x / WBC x   Sq Epi: x / Non Sq Epi: x / Bacteria: x        12-30-24 @ 07:01  -  12-31-24 @ 07:00  --------------------------------------------------------  IN: 1157 mL / OUT: 1300 mL / NET: -143 mL        12-30-24 @ 07:01  -  12-31-24 @ 07:00  --------------------------------------------------------  IN: 1157 mL / OUT: 1300 mL / NET: -143 mL    RADIOLOGY:  < from: TTE Echo Complete w/o Contrast w/ Doppler (12.27.24 @ 12:57) >  Summary:   1. Left ventricular ejection fraction, by visual estimation, is 40 to   45%.   2. Mildly enlarged left atrium.   3. Mild mitral annular calcification.   4. Mild mitral valve regurgitation.   5. Mild tricuspid regurgitation.   6. Bioprosthesis in the aortic position.   7. Ascending aorta 3.7 cm.   8. Estimated pulmonary artery systolic pressure is 35.7 mmHg assuming a   right atrial pressure of 3 mmHg, which is consistent with borderline   pulmonary hypertension.    PHYSICIAN INTERPRETATION:  Left Ventricle: Left ventricular ejection fraction, by visual estimation,   is 40 to 45%.  Left Atrium: Mildly enlarged left atrium.  Mitral Valve: There is mild mitral annular calcification. Mild mitral   valve regurgitation is seen.  Tricuspid Valve: Mild tricuspid regurgitation is visualized. Estimated   pulmonary artery systolic pressure is 35.7 mmHg assuming a right atrial   pressure of 3 mmHg, which is consistent with borderline pulmonary   hypertension.  Aorta: Ascending aorta 3.7 cm.    < end of copied text >    < from: Cardiac Cath Lab - Adult (06.15.21 @ 13:49) >  CORONARY CIRCULATION: The coronary circulation is right dominant. There was    1-vessel coronary artery disease (LAD). Left main: Normal. Proximal LAD:    Patent stent Mid LAD: There was a 70 % stenosis at a site with no prior    intervention, at the distal margin of the stented segment. Patent stent    Distal LAD: There was a 70 % stenosis at a site with no prior    intervention. There was ROBERT grade 3 flow through the vessel (brisk flow).    1st diagonal: Angiography showed mild atherosclerosis Proximal circumflex:    There was a 50 % stenosis at a site with no prior intervention. There was    ROBERT grade 3 flow through the vessel (brisk flow). Distal circumflex: The    vessel was small sized. 1st obtuse marginal: Angiography showed moderate    atherosclerosis RCA: patent stent prox and mid segments Distal RCA:    Angiography showed moderate atherosclerosis Right PDA: Angiography showed    mild atherosclerosis         COMPLICATIONS: No complications occurred during the cath lab visit.         IMPRESSIONS: Hemodynamically significant mid LAD lesion AUC score 7. patent    stents in prox LAD and RCA         RECOMMENDATIONS:    Patient management should include aggressive medical therapy, close    monitoring of BUN and creatinine, and aggressive risk factor modification.    The patient should follow a low fat and low calorie diet.    Recommend PCI immediately following this diagnostic procedure.    < end of copied text >    < from: CT Head No Cont (12.31.24 @ 10:19) >  IMPRESSION:  No evidence of acute transcortical infarct, acute intracranial   hemorrhage, or mass effect.    < end of copied text >     INTERVAL HPI/OVERNIGHT EVENTS:  Pt with confusion, getting head CT. Went into flash pulmonary edema after head CT requiring intubation. Upgraded to critical care unit.       MEDICATIONS  (STANDING):  acetaminophen     Tablet .. 650 milliGRAM(s) Oral once  aMIOdarone    Tablet 200 milliGRAM(s) Oral daily  apixaban 5 milliGRAM(s) Oral every 12 hours  atorvastatin 40 milliGRAM(s) Oral at bedtime  buMETAnide Injectable 2 milliGRAM(s) IV Push once  cefepime   IVPB      cefepime   IVPB 2000 milliGRAM(s) IV Intermittent once  chlorhexidine 2% Cloths 1 Application(s) Topical <User Schedule>  clopidogrel Tablet 75 milliGRAM(s) Oral daily  dexMEDEtomidine Infusion 0.2 MICROgram(s)/kG/Hr (4.11 mL/Hr) IV Continuous <Continuous>  dextrose 5%. 1000 milliLiter(s) (50 mL/Hr) IV Continuous <Continuous>  ezetimibe 10 milliGRAM(s) Oral daily  fenofibrate Tablet 145 milliGRAM(s) Oral daily  fentaNYL   Infusion. 0.5 MICROgram(s)/kG/Hr (4.11 mL/Hr) IV Continuous <Continuous>  furosemide   Injectable 40 milliGRAM(s) IV Push two times a day  insulin lispro (ADMELOG) corrective regimen sliding scale   SubCutaneous three times a day before meals  magnesium sulfate  IVPB 2 Gram(s) IV Intermittent once  metoprolol tartrate 150 milliGRAM(s) Oral two times a day  norepinephrine Infusion 0.05 MICROgram(s)/kG/Min (7.7 mL/Hr) IV Continuous <Continuous>  sacubitril 24 mG/valsartan 26 mG 1 Tablet(s) Oral two times a day  vancomycin  IVPB      vancomycin  IVPB 1250 milliGRAM(s) IV Intermittent once    MEDICATIONS  (PRN):  acetaminophen     Tablet .. 650 milliGRAM(s) Oral every 6 hours PRN Temp greater or equal to 38C (100.4F), Mild Pain (1 - 3)  albuterol/ipratropium for Nebulization 3 milliLiter(s) Nebulizer every 6 hours PRN Shortness of Breath and/or Wheezing  aluminum hydroxide/magnesium hydroxide/simethicone Suspension 30 milliLiter(s) Oral every 4 hours PRN Dyspepsia  dextrose Oral Gel 15 Gram(s) Oral once PRN Blood Glucose LESS THAN 70 milliGRAM(s)/deciliter  hydrOXYzine hydrochloride 25 milliGRAM(s) Oral every 6 hours PRN Agitation  melatonin 3 milliGRAM(s) Oral at bedtime PRN Insomnia  ondansetron Injectable 4 milliGRAM(s) IV Push every 8 hours PRN Nausea and/or Vomiting  zolpidem 5 milliGRAM(s) Oral at bedtime PRN Insomnia      Allergies  sulfa drugs (Unknown)      REVIEW OF SYSTEMS    [ ] A ten-point review of systems was otherwise negative except as noted.  [x] Due to altered mental status/intubation, subjective information were not able to be obtained from the patient. History was obtained, to the extent possible, from review of the chart and collateral sources of information.      Vital Signs Last 24 Hrs  T(C): 39.9 (31 Dec 2024 15:02), Max: 39.9 (31 Dec 2024 15:02)  T(F): 103.8 (31 Dec 2024 15:02), Max: 103.8 (31 Dec 2024 15:02)  HR: 108 (31 Dec 2024 14:18) (72 - 138)  BP: 119/68 (31 Dec 2024 14:18) (117/75 - 141/68)  BP(mean): 87 (31 Dec 2024 14:18) (87 - 87)  RR: 8 (31 Dec 2024 14:18) (8 - 22)  SpO2: 100% (31 Dec 2024 14:18) (95% - 100%)    Parameters below as of 31 Dec 2024 14:12  Patient On (Oxygen Delivery Method): ventilator        12-30-24 @ 07:01  -  12-31-24 @ 07:00  --------------------------------------------------------  IN: 1157 mL / OUT: 1300 mL / NET: -143 mL      Physical Exam  n/a  done at remote location    LABS:                        15.2   16.23 )-----------( 451      ( 31 Dec 2024 03:47 )             46.2     12-31    136  |  100  |  25[H]  ----------------------------<  226[H]  4.1   |  20  |  1.1    Ca    8.0[L]      31 Dec 2024 03:47  Mg     2.1     12-30    TPro  5.2[L]  /  Alb  3.1[L]  /  TBili  0.8  /  DBili  x   /  AST  27  /  ALT  22  /  AlkPhos  89  12-31    PT/INR - ( 31 Dec 2024 04:21 )   PT: 30.20 sec;   INR: 2.51 ratio         PTT - ( 31 Dec 2024 04:21 )  PTT:40.9 sec  Urinalysis Basic - ( 31 Dec 2024 03:47 )    Color: x / Appearance: x / SG: x / pH: x  Gluc: 226 mg/dL / Ketone: x  / Bili: x / Urobili: x   Blood: x / Protein: x / Nitrite: x   Leuk Esterase: x / RBC: x / WBC x   Sq Epi: x / Non Sq Epi: x / Bacteria: x        12-30-24 @ 07:01  -  12-31-24 @ 07:00  --------------------------------------------------------  IN: 1157 mL / OUT: 1300 mL / NET: -143 mL        12-30-24 @ 07:01  -  12-31-24 @ 07:00  --------------------------------------------------------  IN: 1157 mL / OUT: 1300 mL / NET: -143 mL    RADIOLOGY:  < from: TTE Echo Complete w/o Contrast w/ Doppler (12.27.24 @ 12:57) >  Summary:   1. Left ventricular ejection fraction, by visual estimation, is 40 to   45%.   2. Mildly enlarged left atrium.   3. Mild mitral annular calcification.   4. Mild mitral valve regurgitation.   5. Mild tricuspid regurgitation.   6. Bioprosthesis in the aortic position.   7. Ascending aorta 3.7 cm.   8. Estimated pulmonary artery systolic pressure is 35.7 mmHg assuming a   right atrial pressure of 3 mmHg, which is consistent with borderline   pulmonary hypertension.    PHYSICIAN INTERPRETATION:  Left Ventricle: Left ventricular ejection fraction, by visual estimation,   is 40 to 45%.  Left Atrium: Mildly enlarged left atrium.  Mitral Valve: There is mild mitral annular calcification. Mild mitral   valve regurgitation is seen.  Tricuspid Valve: Mild tricuspid regurgitation is visualized. Estimated   pulmonary artery systolic pressure is 35.7 mmHg assuming a right atrial   pressure of 3 mmHg, which is consistent with borderline pulmonary   hypertension.  Aorta: Ascending aorta 3.7 cm.    < end of copied text >    < from: Cardiac Cath Lab - Adult (06.15.21 @ 13:49) >  CORONARY CIRCULATION: The coronary circulation is right dominant. There was    1-vessel coronary artery disease (LAD). Left main: Normal. Proximal LAD:    Patent stent Mid LAD: There was a 70 % stenosis at a site with no prior    intervention, at the distal margin of the stented segment. Patent stent    Distal LAD: There was a 70 % stenosis at a site with no prior    intervention. There was ROBERT grade 3 flow through the vessel (brisk flow).    1st diagonal: Angiography showed mild atherosclerosis Proximal circumflex:    There was a 50 % stenosis at a site with no prior intervention. There was    ROBERT grade 3 flow through the vessel (brisk flow). Distal circumflex: The    vessel was small sized. 1st obtuse marginal: Angiography showed moderate    atherosclerosis RCA: patent stent prox and mid segments Distal RCA:    Angiography showed moderate atherosclerosis Right PDA: Angiography showed    mild atherosclerosis         COMPLICATIONS: No complications occurred during the cath lab visit.         IMPRESSIONS: Hemodynamically significant mid LAD lesion AUC score 7. patent    stents in prox LAD and RCA         RECOMMENDATIONS:    Patient management should include aggressive medical therapy, close    monitoring of BUN and creatinine, and aggressive risk factor modification.    The patient should follow a low fat and low calorie diet.    Recommend PCI immediately following this diagnostic procedure.    < end of copied text >    < from: CT Head No Cont (12.31.24 @ 10:19) >  IMPRESSION:  No evidence of acute transcortical infarct, acute intracranial   hemorrhage, or mass effect.    < end of copied text >

## 2024-12-31 NOTE — PROGRESS NOTE ADULT - ASSESSMENT
69-year-old male with past medical history of chronic HFrEF s/p ICD, AVR porcine, HTN, PAF s/p ablation, CAD s/p PCI, DM, HLD, COPD (no home O2), smoker presenting with worsening shortness of breath and weakness with concern for acute on chronic CHF exacerbation in the setting of persistent afib      #Acute on chronic CHF exacerbation in HFrEF  #Atrial fibrillation w/ RVR  - TTE: (10/15/24):  1. Moderately decreased global left ventricular systolic function. Left   ventricular ejection fraction, by visual estimation, is 30 to 35%.   2. Mildly enlarged right ventricle. Moderately reduced RV systolic   function.   3. Moderate to severe left atrial enlargement.   4. Bioprosthetic aortic valve well seated with adequate expanstion.   Normal opening. No paravalvular leak.  - ChadVasc 7, Persistent Afib  - BP stable  - BNP 5279  - Lasix 40mg IV BID  - Toprol 200mg Qdaily-->Lopressor 150mg BID  - resumed home entresto as K has normalized  - low sodium diet <2g  - continue amiodarone 200mg qd  - EP following  - correct electrolytes  -Daily wt, I&O    #AMS  #H/o LT MCA CVA  - According to son Neto, pt has demonstrated confusion and disorganized behavior since CVA last year 9/23  - No neurological deficit on exam although pt does not follow all commands  - CT head non con 12/31 no acute abnormality  - 1:1 observation    #CAP, mild  - b/l lung opacities noted on CXR with a leukocytosis of 15k with no fever  - Completed abx course today (Ceftriaxone, azithromycin)    #CAD  -C/w eliquis, plavix, Bb and lipitor    #DM  -ISS inpatient   -Hold metformin in hospital    #COPD (no home O2)  - Duonebs  - Symbicort    #Hyperlipidemia  -C/w tricor, zetia, lipitor    Diet: CC/DASH  VTE ppx: C/w Eliquis   Handoff: Ablation once medically optimized, remains in Afib w/ RVR, EP following, Transfer north for closer monitoring as per EP   69-year-old male with past medical history of chronic HFrEF s/p ICD, AVR porcine, HTN, PAF s/p ablation, CAD s/p PCI, DM, HLD, COPD (no home O2), smoker presenting with worsening shortness of breath and weakness with concern for acute on chronic CHF exacerbation in the setting of persistent afib      #Acute on chronic CHF exacerbation in HFrEF  #Atrial fibrillation w/ RVR  - TTE: (10/15/24):  1. Moderately decreased global left ventricular systolic function. Left   ventricular ejection fraction, by visual estimation, is 30 to 35%.   2. Mildly enlarged right ventricle. Moderately reduced RV systolic   function.   3. Moderate to severe left atrial enlargement.   4. Bioprosthetic aortic valve well seated with adequate expanstion.   Normal opening. No paravalvular leak.  - ChadVasc 7, Persistent Afib  - BP stable  - BNP 5279  - Lasix 40mg IV BID  - Toprol 200mg Qdaily-->Lopressor 150mg BID  - resumed home entresto as K has normalized  - low sodium diet <2g  - continue amiodarone 200mg qd  - EP following  - correct electrolytes  -Daily wt, I&O    #AMS  #H/o LT MCA CVA  - According to son Neto, pt has demonstrated confusion and disorganized behavior since CVA last year 9/23  - No neurological deficit on exam although pt does not follow all commands  - CT head non con 12/31 no acute abnormality  - 1:1 observation    #CAP, mild  - b/l lung opacities noted on CXR with a leukocytosis of 15k with no fever  - Completed abx course today (Ceftriaxone, azithromycin)    #CAD  -C/w eliquis, plavix, Bb and lipitor    #DM  -ISS inpatient   -Hold metformin in hospital    #COPD (no home O2)  - Goal SpO2 88-92  - Duonebs  - Symbicort    #Hyperlipidemia  -C/w tricor, zetia, lipitor    Diet: CC/DASH  VTE ppx: C/w Eliquis   Handoff: Ablation once medically optimized, remains in Afib w/ RVR, EP following, Transfer north for closer monitoring as per EP   69-year-old male with past medical history of chronic HFrEF s/p ICD, AVR porcine, HTN, PAF s/p ablation, CAD s/p PCI, DM, HLD, COPD (no home O2), smoker presenting with worsening shortness of breath and weakness with concern for acute on chronic CHF exacerbation in the setting of persistent afib      #Acute on chronic CHF exacerbation in HFrEF  #Atrial fibrillation w/ RVR  - TTE: (10/15/24):  1. Moderately decreased global left ventricular systolic function. Left   ventricular ejection fraction, by visual estimation, is 30 to 35%.   2. Mildly enlarged right ventricle. Moderately reduced RV systolic   function.   3. Moderate to severe left atrial enlargement.   4. Bioprosthetic aortic valve well seated with adequate expansion.   Normal opening. No paravalvular leak.  - ChadVasc 7, Persistent Afib  - BP stable  - BNP 5279  - Lasix 40mg IV BID  - Toprol 200mg Qdaily-->Lopressor 150mg BID  - resumed home entresto as K has normalized  - low sodium diet <2g  - continue amiodarone 200mg qd  - EP following  - correct electrolytes  -Daily wt, I&O    #AMS  #H/o LT MCA CVA  - According to son Neto, pt has demonstrated confusion and disorganized behavior since CVA last year 9/23  - No neurological deficit on exam although pt does not follow all commands  - CT head non con 12/31 no acute abnormality  - 1:1 observation    #CAP, mild  - b/l lung opacities noted on CXR with a leukocytosis of 15k with no fever  - Completed abx course today (Ceftriaxone, azithromycin)    #CAD  -C/w eliquis, plavix, Bb and lipitor    #DM  -ISS inpatient   -Hold metformin in hospital    #COPD (no home O2)  - Goal SpO2 88-92  - Duonebs  - Symbicort    #Hyperlipidemia  -C/w tricor, zetia, lipitor    Diet: CC/DASH  VTE ppx: C/w Eliquis   *****************See following rapid response note    69-year-old male with past medical history of chronic HFrEF s/p ICD, AVR porcine, HTN, PAF s/p ablation, CAD s/p PCI, DM, HLD, COPD (no home O2), smoker presenting with worsening shortness of breath and weakness with concern for acute on chronic CHF exacerbation in the setting of persistent afib      #Acute on chronic CHF exacerbation in HFrEF  #Persistent Atrial fibrillation w/ RVR  - TTE: (10/15/24):  1. Moderately decreased global left ventricular systolic function. Left   ventricular ejection fraction, by visual estimation, is 30 to 35%.   2. Mildly enlarged right ventricle. Moderately reduced RV systolic   function.   3. Moderate to severe left atrial enlargement.   4. Bioprosthetic aortic valve well seated with adequate expansion.   Normal opening. No paravalvular leak.  - ChadVasc 7, Persistent Afib  - BP stable  - BNP 5279  - Lasix 40mg IV BID  - Toprol 200mg PO Qdaily-->Lopressor PO 150mg BID  - resumed home entresto as K has normalized  - low sodium diet <2g  - continue amiodarone 200mg qd  - EP following  - correct electrolytes  -Daily wt, I&O    #AMS  #H/o LT MCA CVA  - According to son Neto, pt has demonstrated confusion and disorganized behavior since CVA last year 9/23  - No neurological deficit on exam although pt does not follow all commands  - CT head non con 12/31 no acute abnormality  - 1:1 observation    #CAP, mild  - b/l lung opacities noted on CXR with a leukocytosis of 15k with no fever  - Completed abx course today (Ceftriaxone, azithromycin)    #CAD  -C/w eliquis, plavix, Bb and lipitor    #DM  -ISS inpatient   -Hold metformin in hospital    #COPD (no home O2)  - Goal SpO2 88-92  - Duonebs  - Symbicort    #Hyperlipidemia  -C/w tricor, zetia, lipitor    Diet: CC/DASH  VTE ppx: C/w Eliquis  Plan was for patient to be transferred north as per EP  *****************See following rapid response note ***********************   69-year-old male with past medical history of chronic HFrEF s/p ICD, AVR porcine, HTN, PAF s/p ablation, CAD s/p PCI, DM, HLD, COPD (no home O2), smoker presenting with worsening shortness of breath and weakness with concern for acute on chronic CHF exacerbation in the setting of persistent afib      #Acute on chronic CHF exacerbation in HFmrEF  #Persistent Atrial fibrillation w/ RVR  - TTE: (10/15/24):  1. Moderately decreased global left ventricular systolic function. Left   ventricular ejection fraction, by visual estimation, is 40-45   2. Mildly enlarged right ventricle. Moderately reduced RV systolic   function.   3. Moderate to severe left atrial enlargement.   4. Bioprosthetic aortic valve well seated with adequate expansion.   Normal opening. No paravalvular leak.  - ChadVasc 7, Persistent Afib  - BP stable  - BNP 5279  - Lasix 40mg IV BID  - Toprol 200mg PO Qdaily-->Lopressor PO 150mg BID  - resumed home entresto as K has normalized  - low sodium diet <2g  - continue amiodarone 200mg qd  - EP following  - correct electrolytes  -Daily wt, I&O    #AMS  #H/o LT MCA CVA  - According to son Neto, pt has demonstrated confusion and disorganized behavior since CVA last year 9/23  - No neurological deficit on exam although pt does not follow all commands  - CT head non con 12/31 no acute abnormality  - 1:1 observation    #CAP, mild  - b/l lung opacities noted on CXR with a leukocytosis of 15k with no fever  - Completed abx course today (Ceftriaxone, azithromycin)    #CAD  -C/w eliquis, plavix, Bb and lipitor    #DM  -ISS inpatient   -Hold metformin in hospital    #COPD (no home O2)  - Goal SpO2 88-92  - Duonebs  - Symbicort    #Hyperlipidemia  -C/w tricor, zetia, lipitor    Diet: CC/DASH  VTE ppx: C/w Eliquis  Plan was for patient to be transferred north as per EP  *****************See following rapid response note ***********************

## 2024-12-31 NOTE — PROCEDURE NOTE - ADDITIONAL PROCEDURE DETAILS
Anesthesia called to hospital bed for patient in acute respiratory failure. Responded to RT and PA at bedside assisting patient on bipap.   Vitals (154/82, , SPO2 90% on 100%FIO2).   Mcdonald blade 4 used, suction present, 14mg etomidate, 100mg Rocuronium given, 7.5ETT advanced under video view, EZcap + b/s equal b/l  Vitals s/p Intubation (146/88. , SPO2 95%), humidification seen inside ETT, secured by RT @ 22cm at lips. Anesthesia called to hospital bed for patient in acute respiratory failure. Responded to RT and PA at bedside assisting patient on bipap.   Vitals (154/82, , SPO2 90% on 100%FIO2).   Tai blade 4 used, suction present, 14mg etomidate, 100mg Rocuronium given, 7.5ETT advanced under video view, EZcap + b/s equal b/l  Vitals s/p Intubation (146/88. , SPO2 95%), humidification seen inside ETT, secured by RT @ 22cm at lips.

## 2024-12-31 NOTE — PROGRESS NOTE ADULT - ASSESSMENT
69-year-old male with past medical history of chronic HFrEF s/p ICD, AVR porcine, HTN, PAF s/p ablation, CAD s/p PCI, DM, HLD, COPD (no home O2), smoker presenting with worsening shortness of breath and weakness with concern for acute on chronic CHF exacerbation in the setting of persistent afib. 12/31 pt went into flash pulm edema requiring intubation.     Impression:  #Persistent AF  #ICM s/p CRT-D  #Bioprosthetic AV  #Acute CHF exacerbation    Recommendations:  - Telemetry and upgrade to CCU  - Continue IV lasix, monitor I/O, BUN/Cr, CXR   - Continue metoprolol   - Continue amiodarone 200 mg q24h  - Continue uninterrupted Eliquis 5 mg q12h  - Ongoing evaluation for timing of ablation, tentatively next week  - Please recall EPS once patient is medically stable for OR   69-year-old male with past medical history of chronic HFrEF s/p ICD, AVR porcine, HTN, PAF s/p ablation, CAD s/p PCI, DM, HLD, COPD (no home O2), smoker presenting with worsening shortness of breath and weakness with concern for acute on chronic CHF exacerbation in the setting of persistent afib. 12/31 pt went into flash pulm edema requiring intubation.     Impression:  #Persistent AF  #ICM s/p CRT-D  #Bioprosthetic AV  #Acute CHF exacerbation    Recommendations:  - Telemetry and upgrade to CCU  - Continue IV lasix, monitor I/O, BUN/Cr, CXR   - Continue metoprolol   - Continue amiodarone 200 mg q24h  - Continue uninterrupted Eliquis 5 mg q12h  - Ongoing evaluation for timing of ablation, tentatively next week  - Please recall EPS once patient is medically stable for OR      Plan discussed with medical attending

## 2024-12-31 NOTE — PROVIDER CONTACT NOTE (CHANGE IN STATUS NOTIFICATION) - ASSESSMENT
pt hypoxic and lethargic. when aroused pt complains of sharp chest pain
soft/nontender/no distention

## 2024-12-31 NOTE — DISCHARGE NOTE NURSING/CASE MANAGEMENT/SOCIAL WORK - NSDCPEWEB_GEN_ALL_CORE
[facility] for Tobacco Control website --- [email or web address] website --- [email or web address]

## 2024-12-31 NOTE — DISCHARGE NOTE NURSING/CASE MANAGEMENT/SOCIAL WORK - FINANCIAL ASSISTANCE
[facility] provides services at a reduced cost to those who are determined to be eligible through [facility]’s financial assistance program. Information regarding [facility]’s financial assistance program can be found by going to [email or web address] or by calling [phone].

## 2024-12-31 NOTE — RAPID RESPONSE TEAM SUMMARY - NSSITUATIONBACKGROUNDRRT_GEN_ALL_CORE
Pt undergoing acute management for acute on chronic heart failure and persistent Afib w/ RVR. Pt was noted to have increased work of breathing and hypoxia. Pt was placed on nonrebreather (100%). STAT CXR demonstrated worsening fluid overload/ flash pulmonary edema. Pt was then placed on BIPAP. Pt demonstrated worsening somnolence w/ blunted response to noxious stimuli and increasingly labored breathing. GOC conversation discussed w/ Son Neto. Pt remained full code w/ full medical management. Decision made to intubate patient. Upgrade to ICU approved by intensivist.

## 2024-12-31 NOTE — PROGRESS NOTE ADULT - SUBJECTIVE AND OBJECTIVE BOX
Patient is a 69y old  Male who presents with a chief complaint of CHF exacerbation (27 Dec 2024 12:53)      SUBJECTIVE / OVERNIGHT EVENTS: Pt is still volume overloaded and requiring 3L of O2. Normally is on room air and states he was supposed to get home O2 but has not received it yet. Urinating well  ADDITIONAL REVIEW OF SYSTEMS: as above    MEDICATIONS  (STANDING):  aMIOdarone    Tablet 200 milliGRAM(s) Oral daily  apixaban 5 milliGRAM(s) Oral every 12 hours  atorvastatin 40 milliGRAM(s) Oral at bedtime  azithromycin  IVPB      azithromycin  IVPB 500 milliGRAM(s) IV Intermittent every 24 hours  cefTRIAXone   IVPB 1000 milliGRAM(s) IV Intermittent every 24 hours  chlorhexidine 2% Cloths 1 Application(s) Topical <User Schedule>  clopidogrel Tablet 75 milliGRAM(s) Oral daily  dextrose 5%. 1000 milliLiter(s) (50 mL/Hr) IV Continuous <Continuous>  ezetimibe 10 milliGRAM(s) Oral daily  fenofibrate Tablet 145 milliGRAM(s) Oral daily  furosemide   Injectable 40 milliGRAM(s) IV Push daily  insulin lispro (ADMELOG) corrective regimen sliding scale   SubCutaneous three times a day before meals  metoprolol succinate  milliGRAM(s) Oral daily    MEDICATIONS  (PRN):  acetaminophen     Tablet .. 650 milliGRAM(s) Oral every 6 hours PRN Temp greater or equal to 38C (100.4F), Mild Pain (1 - 3)  aluminum hydroxide/magnesium hydroxide/simethicone Suspension 30 milliLiter(s) Oral every 4 hours PRN Dyspepsia  dextrose Oral Gel 15 Gram(s) Oral once PRN Blood Glucose LESS THAN 70 milliGRAM(s)/deciliter  melatonin 3 milliGRAM(s) Oral at bedtime PRN Insomnia  ondansetron Injectable 4 milliGRAM(s) IV Push every 8 hours PRN Nausea and/or Vomiting  zolpidem 5 milliGRAM(s) Oral at bedtime PRN Insomnia      CAPILLARY BLOOD GLUCOSE      POCT Blood Glucose.: 193 mg/dL (28 Dec 2024 11:45)  POCT Blood Glucose.: 75 mg/dL (28 Dec 2024 07:51)  POCT Blood Glucose.: 158 mg/dL (27 Dec 2024 20:58)  POCT Blood Glucose.: 109 mg/dL (27 Dec 2024 16:38)    I&O's Summary    ECG:  < from: 12 Lead ECG (12.27.24 @ 06:18) >   Ventricular-paced rhythm  Biventricular pacemaker detected  Abnormal ECG    < from: Transesophageal Echocardiogram (10.15.24 @ 12:18) >    Summary:   1. Moderately decreased global left ventricular systolic function. Left   ventricular ejection fraction, by visual estimation, is 30 to 35%.   2. Mildly enlarged right ventricle. Moderately reduced RV systolic   function.   3. Moderate to severe left atrial enlargement.   4. Bioprosthetic aortic valve well seated with adequate expanstion.   Normal opening. No paravalvular leak.   5. Mild mitral valve regurgitation.   6. Mild tricuspid regurgitation.   7. No left atrial appendage thrombus and decreased left atrial appendage   velocities.   8. After probe removal patient underwent successful synchronized   cardioversion with 200J.    PHYSICAL EXAM:  Vital Signs Last 24 Hrs  T(C): 36.7 (28 Dec 2024 04:03), Max: 36.7 (28 Dec 2024 04:03)  T(F): 98.1 (28 Dec 2024 04:03), Max: 98.1 (28 Dec 2024 04:03)  HR: 103 (28 Dec 2024 04:03) (89 - 103)  BP: 135/88 (28 Dec 2024 04:03) (127/84 - 137/76)  BP(mean): --  RR: 18 (28 Dec 2024 04:03) (18 - 18)  SpO2: 97% (28 Dec 2024 04:03) (95% - 100%)    Parameters below as of 28 Dec 2024 00:32  Patient On (Oxygen Delivery Method): nasal cannula      CONSTITUTIONAL: NAD, chronically ill, confused  EYES: PERRLA; conjunctiva and sclera clear  ENMT: Moist oral mucosa, no pharyngeal injection or exudates  NECK: Supple, no palpable masses; no thyromegaly  RESPIRATORY: Normal respiratory effort; decreased breath sounds in b/l lungs  CARDIOVASCULAR: Tachy, irregularly irregular  ABDOMEN: Nontender to palpation, normoactive bowel sounds  MUSCULOSKELETAL: no clubbing or cyanosis of digits; no joint swelling or tenderness to palpation  PSYCH: AAOx 1-2  SKIN: No rashes; no palpable lesions                              15.2   16.23 )-----------( 451      ( 31 Dec 2024 03:47 )             46.2       12-31    136  |  100  |  25[H]  ----------------------------<  226[H]  4.1   |  20  |  1.1    Ca    8.0[L]      31 Dec 2024 03:47  Mg     2.1     12-30    TPro  5.2[L]  /  Alb  3.1[L]  /  TBili  0.8  /  DBili  x   /  AST  27  /  ALT  22  /  AlkPhos  89  12-31          ABG - ( 31 Dec 2024 03:36 )  pH, Arterial: 7.52  pH, Blood: x     /  pCO2: 31    /  pO2: 200   / HCO3: 25    / Base Excess: 3.2   /  SaO2: 99.2                Urinalysis Basic - ( 31 Dec 2024 03:47 )    Color: x / Appearance: x / SG: x / pH: x  Gluc: 226 mg/dL / Ketone: x  / Bili: x / Urobili: x   Blood: x / Protein: x / Nitrite: x   Leuk Esterase: x / RBC: x / WBC x   Sq Epi: x / Non Sq Epi: x / Bacteria: x        PT/INR - ( 31 Dec 2024 04:21 )   PT: 30.20 sec;   INR: 2.51 ratio         PTT - ( 31 Dec 2024 04:21 )  PTT:40.9 sec    Lactate Trend            CAPILLARY BLOOD GLUCOSE      POCT Blood Glucose.: 180 mg/dL (31 Dec 2024 11:20)

## 2024-12-31 NOTE — CONSULT NOTE ADULT - SUBJECTIVE AND OBJECTIVE BOX
Patient is a 69y old  Male who presents with a chief complaint of CHF exacerbation (31 Dec 2024 11:23)      HPI:  This is a 69-year-old male with past medical history of chronic HFrEF s/p ICD, AVR porcine, HTN, PAF s/p ablation, CAD s/p PCI, DM, HLD, COPD (no home O2), smoker presenting with worsening shortness of breath and weakness.  Patient was admitted within the last 24 hours for acute on chronic CHF exacerbation, was given IV Lasix and BiPAP, and left AMA.  Patient returns stating that he became more short of breath last night after leaving the hospital and would like to be readmitted for treatment.  Denies fever/chills, chest pain, abdominal pain, calf pain, N/V/D/C, cough, and nasal congestion.      (27 Dec 2024 10:46)      PAST MEDICAL & SURGICAL HISTORY:  CHF (congestive heart failure)      Diabetes      CAD (coronary artery disease)      Chronic obstructive pulmonary disease (COPD)      HTN (hypertension)      Stented coronary artery      Dyslipidemia      GERD (gastroesophageal reflux disease)      Afib      CVA (cerebral vascular accident)      H/O heart artery stent      Heart valve replaced  aortic      History of Nissen fundoplication          SOCIAL HX:   Smoking                         ETOH                            Other    FAMILY HISTORY:  :  No known cardiovacular family hisotry     Review Of Systems:     All ROS are negative except per HPI       Allergies    sulfa drugs (Unknown)    Intolerances          PHYSICAL EXAM    ICU Vital Signs Last 24 Hrs  T(C): 36.4 (31 Dec 2024 04:09), Max: 36.4 (31 Dec 2024 03:35)  T(F): 97.5 (31 Dec 2024 04:09), Max: 97.5 (31 Dec 2024 03:35)  HR: 77 (31 Dec 2024 04:47) (72 - 138)  BP: 127/86 (31 Dec 2024 04:47) (117/75 - 140/98)  BP(mean): --  ABP: --  ABP(mean): --  RR: 20 (31 Dec 2024 04:47) (18 - 22)  SpO2: 100% (31 Dec 2024 13:45) (95% - 100%)    O2 Parameters below as of 31 Dec 2024 04:47  Patient On (Oxygen Delivery Method): nasal cannula  O2 Flow (L/min): 3          CONSTITUTIONAL:  Well nourished.   NAD    ENT:   Airway patent,   Mouth with normal mucosa.   No thrush      CARDIAC:   Normal rate,   Regular rhythm.    No edema      Vascular:   normal systolic impulse  no bruits    RESPIRATORY:   No wheezing  Bilateral BS   Not tachypneic,  No use of accessory muscles    GASTROINTESTINAL:  Abdomen soft,   Non-tender,   No guarding,   + BS      NEUROLOGICAL:   Alert and oriented   No motor deficits.    SKIN:   Skin normal color for race,   No evidence of rash.      HEME LYMPH: .  No cervical  lymphadenopathy.  No inguinal lymphadenopathy            12-30-24 @ 07:01  -  12-31-24 @ 07:00  --------------------------------------------------------  IN:    IV PiggyBack: 300 mL    Oral Fluid: 857 mL  Total IN: 1157 mL    OUT:    Voided (mL): 1300 mL  Total OUT: 1300 mL    Total NET: -143 mL          LABS:                          15.2   16.23 )-----------( 451      ( 31 Dec 2024 03:47 )             46.2                                               12-31    136  |  100  |  25[H]  ----------------------------<  226[H]  4.1   |  20  |  1.1    Ca    8.0[L]      31 Dec 2024 03:47  Mg     2.1     12-30    TPro  5.2[L]  /  Alb  3.1[L]  /  TBili  0.8  /  DBili  x   /  AST  27  /  ALT  22  /  AlkPhos  89  12-31      PT/INR - ( 31 Dec 2024 04:21 )   PT: 30.20 sec;   INR: 2.51 ratio         PTT - ( 31 Dec 2024 04:21 )  PTT:40.9 sec                                       Urinalysis Basic - ( 31 Dec 2024 03:47 )    Color: x / Appearance: x / SG: x / pH: x  Gluc: 226 mg/dL / Ketone: x  / Bili: x / Urobili: x   Blood: x / Protein: x / Nitrite: x   Leuk Esterase: x / RBC: x / WBC x   Sq Epi: x / Non Sq Epi: x / Bacteria: x                                                  LIVER FUNCTIONS - ( 31 Dec 2024 03:47 )  Alb: 3.1 g/dL / Pro: 5.2 g/dL / ALK PHOS: 89 U/L / ALT: 22 U/L / AST: 27 U/L / GGT: x                                                                                               Mode: AC/ CMV (Assist Control/ Continuous Mandatory Ventilation)  RR (machine): 20  TV (machine): 450  FiO2: 100  PEEP: 8  MAP: 13  PIP: 25                                      ABG - ( 31 Dec 2024 03:36 )  pH, Arterial: 7.52  pH, Blood: x     /  pCO2: 31    /  pO2: 200   / HCO3: 25    / Base Excess: 3.2   /  SaO2: 99.2                X-Rays reviewed                                                                                     ECHO    CXR interpreted by me     MEDICATIONS  (STANDING):  aMIOdarone    Tablet 200 milliGRAM(s) Oral daily  apixaban 5 milliGRAM(s) Oral every 12 hours  atorvastatin 40 milliGRAM(s) Oral at bedtime  buMETAnide Injectable 2 milliGRAM(s) IV Push once  chlorhexidine 2% Cloths 1 Application(s) Topical <User Schedule>  clopidogrel Tablet 75 milliGRAM(s) Oral daily  dexMEDEtomidine Infusion 0.2 MICROgram(s)/kG/Hr (4.11 mL/Hr) IV Continuous <Continuous>  dextrose 5%. 1000 milliLiter(s) (50 mL/Hr) IV Continuous <Continuous>  ezetimibe 10 milliGRAM(s) Oral daily  fenofibrate Tablet 145 milliGRAM(s) Oral daily  fentaNYL   Infusion... 0.5 MICROgram(s)/kG/Hr (2.05 mL/Hr) IV Continuous <Continuous>  furosemide   Injectable 40 milliGRAM(s) IV Push two times a day  insulin lispro (ADMELOG) corrective regimen sliding scale   SubCutaneous three times a day before meals  metoprolol tartrate 150 milliGRAM(s) Oral two times a day  norepinephrine Infusion 0.05 MICROgram(s)/kG/Min (7.7 mL/Hr) IV Continuous <Continuous>  sacubitril 24 mG/valsartan 26 mG 1 Tablet(s) Oral two times a day    MEDICATIONS  (PRN):  acetaminophen     Tablet .. 650 milliGRAM(s) Oral every 6 hours PRN Temp greater or equal to 38C (100.4F), Mild Pain (1 - 3)  albuterol/ipratropium for Nebulization 3 milliLiter(s) Nebulizer every 6 hours PRN Shortness of Breath and/or Wheezing  aluminum hydroxide/magnesium hydroxide/simethicone Suspension 30 milliLiter(s) Oral every 4 hours PRN Dyspepsia  dextrose Oral Gel 15 Gram(s) Oral once PRN Blood Glucose LESS THAN 70 milliGRAM(s)/deciliter  hydrOXYzine hydrochloride 25 milliGRAM(s) Oral every 6 hours PRN Agitation  melatonin 3 milliGRAM(s) Oral at bedtime PRN Insomnia  ondansetron Injectable 4 milliGRAM(s) IV Push every 8 hours PRN Nausea and/or Vomiting  zolpidem 5 milliGRAM(s) Oral at bedtime PRN Insomnia         Patient is a 69y old  Male who presents with a chief complaint of CHF exacerbation (31 Dec 2024 11:23)      HPI: 68 yo M with past medical history of chronic HFrEF s/p ICD, AVR porcine, HTN, PAF s/p ablation, CAD s/p PCI, DM, HLD, COPD (no home O2), smoker presenting with worsening shortness of breath and weakness.  Patient was admitted within the last 24 hours for acute on chronic CHF exacerbation, was given IV Lasix and BiPAP, and left AMA.  Patient returns stating that he became more short of breath last night after leaving the hospital and would like to be readmitted for treatment.  Denies fever/chills, chest pain, abdominal pain, calf pain, N/V/D/C, cough, and nasal congestion.     Hospital course complicated today with noted increased work of breathing and altered mental status, intubated for acute hypoxemic respiratory failure secondary to pulmonary edema secondary to decompensated heart failure.      (27 Dec 2024 10:46)      PAST MEDICAL & SURGICAL HISTORY:  CHF (congestive heart failure)      Diabetes      CAD (coronary artery disease)      Chronic obstructive pulmonary disease (COPD)      HTN (hypertension)      Stented coronary artery      Dyslipidemia      GERD (gastroesophageal reflux disease)      Afib      CVA (cerebral vascular accident)      H/O heart artery stent      Heart valve replaced  aortic      History of Nissen fundoplication        FAMILY HISTORY:  :  No known cardiovascular family history     Review Of Systems:     All ROS are negative except per HPI       Allergies    sulfa drugs (Unknown)    Intolerances      ital Signs Last 24 Hrs  T(C): 36.4 (31 Dec 2024 04:09), Max: 36.4 (31 Dec 2024 03:35)  T(F): 97.5 (31 Dec 2024 04:09), Max: 97.5 (31 Dec 2024 03:35)  HR: 77 (31 Dec 2024 04:47) (72 - 138)  BP: 127/86 (31 Dec 2024 04:47) (117/75 - 140/98)  RR: 20 (31 Dec 2024 04:47) (18 - 22)  SpO2: 100% (31 Dec 2024 13:45) (95% - 100%)    O2 Parameters below as of 31 Dec 2024 04:47  Patient On (Oxygen Delivery Method): nasal cannula  O2 Flow (L/min): 3          CONSTITUTIONAL:  Ill appearing  NAD    ENT:   Airway patent,   Mouth with normal mucosa.   No thrush      CARDIAC:   irregular       RESPIRATORY:   No wheezing  Bilateral BS   Not tachypneic,  No use of accessory muscles    GASTROINTESTINAL:  Abdomen soft,   Non-tender,   No guarding,   + BS      NEUROLOGICAL:   not responsive     SKIN:   Skin normal color for race          12-30-24 @ 07:01  -  12-31-24 @ 07:00  --------------------------------------------------------  IN:    IV PiggyBack: 300 mL    Oral Fluid: 857 mL  Total IN: 1157 mL    OUT:    Voided (mL): 1300 mL  Total OUT: 1300 mL    Total NET: -143 mL          LABS:                          15.2   16.23 )-----------( 451      ( 31 Dec 2024 03:47 )             46.2                                               12-31    136  |  100  |  25[H]  ----------------------------<  226[H]  4.1   |  20  |  1.1    Ca    8.0[L]      31 Dec 2024 03:47  Mg     2.1     12-30    TPro  5.2[L]  /  Alb  3.1[L]  /  TBili  0.8  /  DBili  x   /  AST  27  /  ALT  22  /  AlkPhos  89  12-31      PT/INR - ( 31 Dec 2024 04:21 )   PT: 30.20 sec;   INR: 2.51 ratio         PTT - ( 31 Dec 2024 04:21 )  PTT:40.9 sec                                       Urinalysis Basic - ( 31 Dec 2024 03:47 )    Color: x / Appearance: x / SG: x / pH: x  Gluc: 226 mg/dL / Ketone: x  / Bili: x / Urobili: x   Blood: x / Protein: x / Nitrite: x   Leuk Esterase: x / RBC: x / WBC x   Sq Epi: x / Non Sq Epi: x / Bacteria: x                                                  LIVER FUNCTIONS - ( 31 Dec 2024 03:47 )  Alb: 3.1 g/dL / Pro: 5.2 g/dL / ALK PHOS: 89 U/L / ALT: 22 U/L / AST: 27 U/L / GGT: x                                                                                               Mode: AC/ CMV (Assist Control/ Continuous Mandatory Ventilation)  RR (machine): 20  TV (machine): 450  FiO2: 100  PEEP: 8  MAP: 13  PIP: 25                                      ABG - ( 31 Dec 2024 03:36 )  pH, Arterial: 7.52  pH, Blood: x     /  pCO2: 31    /  pO2: 200   / HCO3: 25    / Base Excess: 3.2   /  SaO2: 99.2            MEDICATIONS  (STANDING):  aMIOdarone    Tablet 200 milliGRAM(s) Oral daily  apixaban 5 milliGRAM(s) Oral every 12 hours  atorvastatin 40 milliGRAM(s) Oral at bedtime  buMETAnide Injectable 2 milliGRAM(s) IV Push once  chlorhexidine 2% Cloths 1 Application(s) Topical <User Schedule>  clopidogrel Tablet 75 milliGRAM(s) Oral daily  dexMEDEtomidine Infusion 0.2 MICROgram(s)/kG/Hr (4.11 mL/Hr) IV Continuous <Continuous>  dextrose 5%. 1000 milliLiter(s) (50 mL/Hr) IV Continuous <Continuous>  ezetimibe 10 milliGRAM(s) Oral daily  fenofibrate Tablet 145 milliGRAM(s) Oral daily  fentaNYL   Infusion... 0.5 MICROgram(s)/kG/Hr (2.05 mL/Hr) IV Continuous <Continuous>  furosemide   Injectable 40 milliGRAM(s) IV Push two times a day  insulin lispro (ADMELOG) corrective regimen sliding scale   SubCutaneous three times a day before meals  metoprolol tartrate 150 milliGRAM(s) Oral two times a day  norepinephrine Infusion 0.05 MICROgram(s)/kG/Min (7.7 mL/Hr) IV Continuous <Continuous>  sacubitril 24 mG/valsartan 26 mG 1 Tablet(s) Oral two times a day    MEDICATIONS  (PRN):  acetaminophen     Tablet .. 650 milliGRAM(s) Oral every 6 hours PRN Temp greater or equal to 38C (100.4F), Mild Pain (1 - 3)  albuterol/ipratropium for Nebulization 3 milliLiter(s) Nebulizer every 6 hours PRN Shortness of Breath and/or Wheezing  aluminum hydroxide/magnesium hydroxide/simethicone Suspension 30 milliLiter(s) Oral every 4 hours PRN Dyspepsia  dextrose Oral Gel 15 Gram(s) Oral once PRN Blood Glucose LESS THAN 70 milliGRAM(s)/deciliter  hydrOXYzine hydrochloride 25 milliGRAM(s) Oral every 6 hours PRN Agitation  melatonin 3 milliGRAM(s) Oral at bedtime PRN Insomnia  ondansetron Injectable 4 milliGRAM(s) IV Push every 8 hours PRN Nausea and/or Vomiting  zolpidem 5 milliGRAM(s) Oral at bedtime PRN Insomnia

## 2024-12-31 NOTE — PROGRESS NOTE ADULT - NS ATTEND AMEND GEN_ALL_CORE FT
Agree with plan as above  Cont BB and Amio  Can consider Dig if kidney function is stable   Will need AF ablation once stable

## 2024-12-31 NOTE — CHART NOTE - NSCHARTNOTEFT_GEN_A_CORE
CC tachycardia CC tachycardia  HPI.  Patient is asymptomatic.  confused at baseline as per RN.  Offers no complaints          Vital Signs Last 24 Hrs  T(C): 36.4 (12-31-24 @ 04:09), Max: 36.9 (12-30-24 @ 13:36)  T(F): 97.5 (12-31-24 @ 04:09), Max: 98.4 (12-30-24 @ 13:36)  HR: 77 (12-31-24 @ 04:47) (72 - 138)  BP: 127/86 (12-31-24 @ 04:47) (101/61 - 140/98)  BP(mean): --  RR: 20 (12-31-24 @ 04:47) (18 - 22)  SpO2: 98% (12-31-24 @ 04:47) (94% - 100%)        PHYSICAL EXAM-  GENERAL: NAD,    HEAD:  Atraumatic, Normocephalic  EYES: EOMI, PERRLA, conjunctiva and sclera clear  NECK: Supple, No JVD, Normal thyroid  NERVOUS SYSTEM:  Alert & Oriented X1 moving all extremities   CHEST/LUNG: + rhonchi with good air entry   HEART: Regular rate and rhythm; No murmurs, rubs, or gallops  ABDOMEN: Soft, Nontender, Nondistended; Bowel sounds present  EXTREMITIES:   , No clubbing, cyanosis, or edema  SKIN: No rashes or lesions                                  15.2   16.23 )-----------( 451      ( 31 Dec 2024 03:47 )             46.2     12-31    136  |  100  |  25[H]  ----------------------------<  226[H]  4.1   |  20  |  1.1    Ca    8.0[L]      31 Dec 2024 03:47  Mg     2.1     12-30    TPro  5.2[L]  /  Alb  3.1[L]  /  TBili  0.8  /  DBili  x   /  AST  27  /  ALT  22  /  AlkPhos  89  12-31          Urinalysis Basic - ( 31 Dec 2024 03:47 )    Color: x / Appearance: x / SG: x / pH: x  Gluc: 226 mg/dL / Ketone: x  / Bili: x / Urobili: x   Blood: x / Protein: x / Nitrite: x   Leuk Esterase: x / RBC: x / WBC x   Sq Epi: x / Non Sq Epi: x / Bacteria: x      PT/INR - ( 31 Dec 2024 04:21 )   PT: 30.20 sec;   INR: 2.51 ratio         PTT - ( 31 Dec 2024 04:21 )  PTT:40.9 sec        MEDICATIONS  (STANDING):  aMIOdarone    Tablet 200 milliGRAM(s) Oral daily  apixaban 5 milliGRAM(s) Oral every 12 hours  atorvastatin 40 milliGRAM(s) Oral at bedtime  azithromycin  IVPB      azithromycin  IVPB 500 milliGRAM(s) IV Intermittent every 24 hours  cefTRIAXone   IVPB 1000 milliGRAM(s) IV Intermittent every 24 hours  chlorhexidine 2% Cloths 1 Application(s) Topical <User Schedule>  clopidogrel Tablet 75 milliGRAM(s) Oral daily  dextrose 5%. 1000 milliLiter(s) (50 mL/Hr) IV Continuous <Continuous>  ezetimibe 10 milliGRAM(s) Oral daily  fenofibrate Tablet 145 milliGRAM(s) Oral daily  furosemide   Injectable 40 milliGRAM(s) IV Push two times a day  insulin lispro (ADMELOG) corrective regimen sliding scale   SubCutaneous three times a day before meals  metoprolol tartrate 150 milliGRAM(s) Oral two times a day  sacubitril 24 mG/valsartan 26 mG 1 Tablet(s) Oral two times a day    MEDICATIONS  (PRN):  acetaminophen     Tablet .. 650 milliGRAM(s) Oral every 6 hours PRN Temp greater or equal to 38C (100.4F), Mild Pain (1 - 3)  aluminum hydroxide/magnesium hydroxide/simethicone Suspension 30 milliLiter(s) Oral every 4 hours PRN Dyspepsia  dextrose Oral Gel 15 Gram(s) Oral once PRN Blood Glucose LESS THAN 70 milliGRAM(s)/deciliter  melatonin 3 milliGRAM(s) Oral at bedtime PRN Insomnia  ondansetron Injectable 4 milliGRAM(s) IV Push every 8 hours PRN Nausea and/or Vomiting  zolpidem 5 milliGRAM(s) Oral at bedtime PRN Insomnia        A/P tachycardia, EKG shows paced rhythem. received IV Lasix and IV metoprolol.  rate controlled. reviewed EKG, and rhythm strip with cardiology.  will trend trop.  cardiology to follow up in am

## 2024-12-31 NOTE — PROVIDER CONTACT NOTE (CHANGE IN STATUS NOTIFICATION) - BACKGROUND
This is a 69-year-old male with past medical history of chronic HFrEF s/p ICD, AVR porcine, HTN, PAF s/p ablation, CAD s/p PCI, DM, HLD, COPD (no home O2), smoker presenting with worsening shortness of breath and weakness

## 2024-12-31 NOTE — PROVIDER CONTACT NOTE (CHANGE IN STATUS NOTIFICATION) - RECOMMENDATIONS
EKG obtained. labs and ABG s obtained. chest x-ray completed. lasix, morphine and metoprolol given as ordered

## 2024-12-31 NOTE — RAPID RESPONSE TEAM SUMMARY - NSSITUATIONBACKGROUNDRRT_GEN_ALL_CORE
patient appeared to be having multiple runs of vtach on monitor. patient was complaining of crushing chest pain and was noted to be hypoxic and tchypneic. rapid response called. O2 sat improved on oxygen, ekg obtained, showed paced ventricular rhythm patient appeared to be having multiple runs of vtach on monitor. patient was complaining of crushing chest pain and clutching his chest. was noted to be hypoxic and tachypneic. rapid response called. O2 sat improved on oxygen, ekg obtained, showed paced ventricular rhythm

## 2024-12-31 NOTE — CONSULT NOTE ADULT - ASSESSMENT
IMPRESSION:    Acute Hypoxemic Respiratory Failure   Pulmonary Edema  Acute Decompensated HFmrEF   Toxic Metabolic Encephalopathy   Persistent AFIB awaiting Albation   HO ICM s/p CRT-D  HO Bioprosthetic AV  HO CVA  HO DM   HO COPD     PLAN:    CNS:  Precedex, CT Head 12/31 noted, rEEG    HEENT: Oral and ETT care    PULMONARY:  HOB @ 45 degrees.  Aspiration precautions, decrease TV to 420, post-intubation ABG noted, CXR noted     CARDIOVASCULAR: Volume contraction as tolerated, optimize cardiac therapy, Bumex BID, rate control, Cardiology follow up, TTE reviewed, proBNP noted, trend CE    GI: GI prophylaxis. NPO for now.  Bowel regimen     RENAL:  Follow up lytes.  Correct as needed.    INFECTIOUS DISEASE: Follow up cultures, monitoring off antibiotics     HEMATOLOGICAL:  DC Eliquis, therapeutic LMWH     ENDOCRINE:  Follow up FS.  Insulin protocol if needed.    MUSCULOSKELETAL: bedrest for now    Goals of care     MICU monitoring

## 2025-01-01 ENCOUNTER — INPATIENT (INPATIENT)
Facility: HOSPITAL | Age: 70
LOS: 19 days | DRG: 293 | End: 2025-07-03
Attending: STUDENT IN AN ORGANIZED HEALTH CARE EDUCATION/TRAINING PROGRAM | Admitting: FAMILY MEDICINE
Payer: MEDICARE

## 2025-01-01 VITALS
HEART RATE: 100 BPM | RESPIRATION RATE: 20 BRPM | OXYGEN SATURATION: 94 % | SYSTOLIC BLOOD PRESSURE: 169 MMHG | WEIGHT: 169.98 LBS | HEIGHT: 64 IN | TEMPERATURE: 98 F | DIASTOLIC BLOOD PRESSURE: 98 MMHG

## 2025-01-01 DIAGNOSIS — Z98.890 OTHER SPECIFIED POSTPROCEDURAL STATES: Chronic | ICD-10-CM

## 2025-01-01 DIAGNOSIS — I50.23 ACUTE ON CHRONIC SYSTOLIC (CONGESTIVE) HEART FAILURE: ICD-10-CM

## 2025-01-01 DIAGNOSIS — Z71.89 OTHER SPECIFIED COUNSELING: ICD-10-CM

## 2025-01-01 DIAGNOSIS — I50.9 HEART FAILURE, UNSPECIFIED: ICD-10-CM

## 2025-01-01 DIAGNOSIS — Z95.2 PRESENCE OF PROSTHETIC HEART VALVE: Chronic | ICD-10-CM

## 2025-01-01 DIAGNOSIS — J96.01 ACUTE RESPIRATORY FAILURE WITH HYPOXIA: ICD-10-CM

## 2025-01-01 DIAGNOSIS — Z51.5 ENCOUNTER FOR PALLIATIVE CARE: ICD-10-CM

## 2025-01-01 DIAGNOSIS — Z95.5 PRESENCE OF CORONARY ANGIOPLASTY IMPLANT AND GRAFT: Chronic | ICD-10-CM

## 2025-01-01 LAB
A1C WITH ESTIMATED AVERAGE GLUCOSE RESULT: 8.8 % — HIGH (ref 4–5.6)
ALBUMIN SERPL ELPH-MCNC: 2.6 G/DL — LOW (ref 3.5–5.2)
ALBUMIN SERPL ELPH-MCNC: 2.7 G/DL — LOW (ref 3.5–5.2)
ALBUMIN SERPL ELPH-MCNC: 2.8 G/DL — LOW (ref 3.5–5.2)
ALBUMIN SERPL ELPH-MCNC: 2.9 G/DL — LOW (ref 3.5–5.2)
ALBUMIN SERPL ELPH-MCNC: 3 G/DL — LOW (ref 3.5–5.2)
ALBUMIN SERPL ELPH-MCNC: 3 G/DL — LOW (ref 3.5–5.2)
ALBUMIN SERPL ELPH-MCNC: 3.2 G/DL — LOW (ref 3.5–5.2)
ALBUMIN SERPL ELPH-MCNC: 3.5 G/DL — SIGNIFICANT CHANGE UP (ref 3.5–5.2)
ALBUMIN SERPL ELPH-MCNC: SIGNIFICANT CHANGE UP G/DL (ref 3.5–5.2)
ALP SERPL-CCNC: 100 U/L — SIGNIFICANT CHANGE UP (ref 30–115)
ALP SERPL-CCNC: 52 U/L — SIGNIFICANT CHANGE UP (ref 30–115)
ALP SERPL-CCNC: 53 U/L — SIGNIFICANT CHANGE UP (ref 30–115)
ALP SERPL-CCNC: 55 U/L — SIGNIFICANT CHANGE UP (ref 30–115)
ALP SERPL-CCNC: 57 U/L — SIGNIFICANT CHANGE UP (ref 30–115)
ALP SERPL-CCNC: 58 U/L — SIGNIFICANT CHANGE UP (ref 30–115)
ALP SERPL-CCNC: 58 U/L — SIGNIFICANT CHANGE UP (ref 30–115)
ALP SERPL-CCNC: 59 U/L — SIGNIFICANT CHANGE UP (ref 30–115)
ALP SERPL-CCNC: 59 U/L — SIGNIFICANT CHANGE UP (ref 30–115)
ALP SERPL-CCNC: 61 U/L — SIGNIFICANT CHANGE UP (ref 30–115)
ALP SERPL-CCNC: 62 U/L — SIGNIFICANT CHANGE UP (ref 30–115)
ALP SERPL-CCNC: 63 U/L — SIGNIFICANT CHANGE UP (ref 30–115)
ALP SERPL-CCNC: 64 U/L — SIGNIFICANT CHANGE UP (ref 30–115)
ALP SERPL-CCNC: 73 U/L — SIGNIFICANT CHANGE UP (ref 30–115)
ALP SERPL-CCNC: 75 U/L — SIGNIFICANT CHANGE UP (ref 30–115)
ALP SERPL-CCNC: 80 U/L — SIGNIFICANT CHANGE UP (ref 30–115)
ALP SERPL-CCNC: 80 U/L — SIGNIFICANT CHANGE UP (ref 30–115)
ALP SERPL-CCNC: 82 U/L — SIGNIFICANT CHANGE UP (ref 30–115)
ALP SERPL-CCNC: 85 U/L — SIGNIFICANT CHANGE UP (ref 30–115)
ALP SERPL-CCNC: 89 U/L — SIGNIFICANT CHANGE UP (ref 30–115)
ALP SERPL-CCNC: 95 U/L — SIGNIFICANT CHANGE UP (ref 30–115)
ALP SERPL-CCNC: SIGNIFICANT CHANGE UP U/L (ref 30–115)
ALT FLD-CCNC: 11 U/L — SIGNIFICANT CHANGE UP (ref 0–41)
ALT FLD-CCNC: 11 U/L — SIGNIFICANT CHANGE UP (ref 0–41)
ALT FLD-CCNC: 12 U/L — SIGNIFICANT CHANGE UP (ref 0–41)
ALT FLD-CCNC: 17 U/L — SIGNIFICANT CHANGE UP (ref 0–41)
ALT FLD-CCNC: 229 U/L — HIGH (ref 0–41)
ALT FLD-CCNC: 24 U/L — SIGNIFICANT CHANGE UP (ref 0–41)
ALT FLD-CCNC: 25 U/L — SIGNIFICANT CHANGE UP (ref 0–41)
ALT FLD-CCNC: 25 U/L — SIGNIFICANT CHANGE UP (ref 0–41)
ALT FLD-CCNC: 27 U/L — SIGNIFICANT CHANGE UP (ref 0–41)
ALT FLD-CCNC: 27 U/L — SIGNIFICANT CHANGE UP (ref 0–41)
ALT FLD-CCNC: 30 U/L — SIGNIFICANT CHANGE UP (ref 0–41)
ALT FLD-CCNC: 33 U/L — SIGNIFICANT CHANGE UP (ref 0–41)
ALT FLD-CCNC: 36 U/L — SIGNIFICANT CHANGE UP (ref 0–41)
ALT FLD-CCNC: 37 U/L — SIGNIFICANT CHANGE UP (ref 0–41)
ALT FLD-CCNC: 7 U/L — SIGNIFICANT CHANGE UP (ref 0–41)
ALT FLD-CCNC: 8 U/L — SIGNIFICANT CHANGE UP (ref 0–41)
ALT FLD-CCNC: 9 U/L — SIGNIFICANT CHANGE UP (ref 0–41)
ALT FLD-CCNC: SIGNIFICANT CHANGE UP U/L (ref 0–41)
ANION GAP SERPL CALC-SCNC: 10 MMOL/L — SIGNIFICANT CHANGE UP (ref 7–14)
ANION GAP SERPL CALC-SCNC: 11 MMOL/L — SIGNIFICANT CHANGE UP (ref 7–14)
ANION GAP SERPL CALC-SCNC: 12 MMOL/L — SIGNIFICANT CHANGE UP (ref 7–14)
ANION GAP SERPL CALC-SCNC: 12 MMOL/L — SIGNIFICANT CHANGE UP (ref 7–14)
ANION GAP SERPL CALC-SCNC: 13 MMOL/L — SIGNIFICANT CHANGE UP (ref 7–14)
ANION GAP SERPL CALC-SCNC: 14 MMOL/L — SIGNIFICANT CHANGE UP (ref 7–14)
ANION GAP SERPL CALC-SCNC: 14 MMOL/L — SIGNIFICANT CHANGE UP (ref 7–14)
ANION GAP SERPL CALC-SCNC: 15 MMOL/L — HIGH (ref 7–14)
ANION GAP SERPL CALC-SCNC: 15 MMOL/L — HIGH (ref 7–14)
ANION GAP SERPL CALC-SCNC: 16 MMOL/L — HIGH (ref 7–14)
ANION GAP SERPL CALC-SCNC: 16 MMOL/L — HIGH (ref 7–14)
ANION GAP SERPL CALC-SCNC: 17 MMOL/L — HIGH (ref 7–14)
ANION GAP SERPL CALC-SCNC: 18 MMOL/L — HIGH (ref 7–14)
ANION GAP SERPL CALC-SCNC: 19 MMOL/L — HIGH (ref 7–14)
ANION GAP SERPL CALC-SCNC: 8 MMOL/L — SIGNIFICANT CHANGE UP (ref 7–14)
ANION GAP SERPL CALC-SCNC: 9 MMOL/L — SIGNIFICANT CHANGE UP (ref 7–14)
APTT BLD: 29 SEC — SIGNIFICANT CHANGE UP (ref 27–39.2)
APTT BLD: 33.6 SEC — SIGNIFICANT CHANGE UP (ref 27–39.2)
AST SERPL-CCNC: 11 U/L — SIGNIFICANT CHANGE UP (ref 0–41)
AST SERPL-CCNC: 12 U/L — SIGNIFICANT CHANGE UP (ref 0–41)
AST SERPL-CCNC: 13 U/L — SIGNIFICANT CHANGE UP (ref 0–41)
AST SERPL-CCNC: 18 U/L — SIGNIFICANT CHANGE UP (ref 0–41)
AST SERPL-CCNC: 20 U/L — SIGNIFICANT CHANGE UP (ref 0–41)
AST SERPL-CCNC: 22 U/L — SIGNIFICANT CHANGE UP (ref 0–41)
AST SERPL-CCNC: 23 U/L — SIGNIFICANT CHANGE UP (ref 0–41)
AST SERPL-CCNC: 23 U/L — SIGNIFICANT CHANGE UP (ref 0–41)
AST SERPL-CCNC: 24 U/L — SIGNIFICANT CHANGE UP (ref 0–41)
AST SERPL-CCNC: 24 U/L — SIGNIFICANT CHANGE UP (ref 0–41)
AST SERPL-CCNC: 25 U/L — SIGNIFICANT CHANGE UP (ref 0–41)
AST SERPL-CCNC: 27 U/L — SIGNIFICANT CHANGE UP (ref 0–41)
AST SERPL-CCNC: 29 U/L — SIGNIFICANT CHANGE UP (ref 0–41)
AST SERPL-CCNC: 31 U/L — SIGNIFICANT CHANGE UP (ref 0–41)
AST SERPL-CCNC: 32 U/L — SIGNIFICANT CHANGE UP (ref 0–41)
AST SERPL-CCNC: 40 U/L — SIGNIFICANT CHANGE UP (ref 0–41)
AST SERPL-CCNC: 412 U/L — HIGH (ref 0–41)
AST SERPL-CCNC: 51 U/L — HIGH (ref 0–41)
AST SERPL-CCNC: SIGNIFICANT CHANGE UP U/L (ref 0–41)
BASE EXCESS BLDA CALC-SCNC: -0.9 MMOL/L — SIGNIFICANT CHANGE UP (ref -2–3)
BASE EXCESS BLDA CALC-SCNC: -1.1 MMOL/L — SIGNIFICANT CHANGE UP (ref -2–3)
BASE EXCESS BLDA CALC-SCNC: -1.9 MMOL/L — SIGNIFICANT CHANGE UP (ref -2–3)
BASE EXCESS BLDA CALC-SCNC: -2.4 MMOL/L — LOW (ref -2–3)
BASE EXCESS BLDA CALC-SCNC: 0.8 MMOL/L — SIGNIFICANT CHANGE UP (ref -2–3)
BASE EXCESS BLDA CALC-SCNC: 3.1 MMOL/L — HIGH (ref -2–3)
BASE EXCESS BLDA CALC-SCNC: 3.3 MMOL/L — HIGH (ref -2–3)
BASE EXCESS BLDA CALC-SCNC: 3.9 MMOL/L — HIGH (ref -2–3)
BASE EXCESS BLDA CALC-SCNC: 3.9 MMOL/L — HIGH (ref -2–3)
BASE EXCESS BLDA CALC-SCNC: 4.7 MMOL/L — HIGH (ref -2–3)
BASE EXCESS BLDA CALC-SCNC: 5.1 MMOL/L — HIGH (ref -2–3)
BASE EXCESS BLDV CALC-SCNC: 2.1 MMOL/L — SIGNIFICANT CHANGE UP (ref -2–3)
BASOPHILS # BLD AUTO: 0.05 K/UL — SIGNIFICANT CHANGE UP (ref 0–0.2)
BASOPHILS # BLD AUTO: 0.06 K/UL — SIGNIFICANT CHANGE UP (ref 0–0.2)
BASOPHILS # BLD AUTO: 0.07 K/UL — SIGNIFICANT CHANGE UP (ref 0–0.2)
BASOPHILS # BLD AUTO: 0.08 K/UL — SIGNIFICANT CHANGE UP (ref 0–0.2)
BASOPHILS # BLD AUTO: 0.09 K/UL — SIGNIFICANT CHANGE UP (ref 0–0.2)
BASOPHILS # BLD AUTO: 0.09 K/UL — SIGNIFICANT CHANGE UP (ref 0–0.2)
BASOPHILS # BLD AUTO: 0.1 K/UL — SIGNIFICANT CHANGE UP (ref 0–0.2)
BASOPHILS # BLD AUTO: 0.1 K/UL — SIGNIFICANT CHANGE UP (ref 0–0.2)
BASOPHILS # BLD AUTO: 0.11 K/UL — SIGNIFICANT CHANGE UP (ref 0–0.2)
BASOPHILS # BLD AUTO: 0.12 K/UL — SIGNIFICANT CHANGE UP (ref 0–0.2)
BASOPHILS NFR BLD AUTO: 0.2 % — SIGNIFICANT CHANGE UP (ref 0–1)
BASOPHILS NFR BLD AUTO: 0.3 % — SIGNIFICANT CHANGE UP (ref 0–1)
BASOPHILS NFR BLD AUTO: 0.3 % — SIGNIFICANT CHANGE UP (ref 0–1)
BASOPHILS NFR BLD AUTO: 0.3 % — SIGNIFICANT CHANGE UP (ref 0–2)
BASOPHILS NFR BLD AUTO: 0.4 % — SIGNIFICANT CHANGE UP (ref 0–1)
BASOPHILS NFR BLD AUTO: 0.4 % — SIGNIFICANT CHANGE UP (ref 0–2)
BASOPHILS NFR BLD AUTO: 0.5 % — SIGNIFICANT CHANGE UP (ref 0–2)
BASOPHILS NFR BLD AUTO: 1 % — SIGNIFICANT CHANGE UP (ref 0–1)
BILIRUB SERPL-MCNC: 0.2 MG/DL — SIGNIFICANT CHANGE UP (ref 0.2–1.2)
BILIRUB SERPL-MCNC: 0.3 MG/DL — SIGNIFICANT CHANGE UP (ref 0.2–1.2)
BILIRUB SERPL-MCNC: 0.4 MG/DL — SIGNIFICANT CHANGE UP (ref 0.2–1.2)
BILIRUB SERPL-MCNC: 0.6 MG/DL — SIGNIFICANT CHANGE UP (ref 0.2–1.2)
BILIRUB SERPL-MCNC: 0.9 MG/DL — SIGNIFICANT CHANGE UP (ref 0.2–1.2)
BILIRUB SERPL-MCNC: <0.2 MG/DL — SIGNIFICANT CHANGE UP (ref 0.2–1.2)
BILIRUB SERPL-MCNC: SIGNIFICANT CHANGE UP MG/DL (ref 0.2–1.2)
BLD GP AB SCN SERPL QL: SIGNIFICANT CHANGE UP
BUN SERPL-MCNC: 22 MG/DL — HIGH (ref 10–20)
BUN SERPL-MCNC: 22 MG/DL — HIGH (ref 10–20)
BUN SERPL-MCNC: 25 MG/DL — HIGH (ref 10–20)
BUN SERPL-MCNC: 28 MG/DL — HIGH (ref 10–20)
BUN SERPL-MCNC: 37 MG/DL — HIGH (ref 10–20)
BUN SERPL-MCNC: 41 MG/DL — HIGH (ref 10–20)
BUN SERPL-MCNC: 45 MG/DL — HIGH (ref 10–20)
BUN SERPL-MCNC: 53 MG/DL — HIGH (ref 10–20)
BUN SERPL-MCNC: 56 MG/DL — HIGH (ref 10–20)
BUN SERPL-MCNC: 56 MG/DL — HIGH (ref 10–20)
BUN SERPL-MCNC: 59 MG/DL — HIGH (ref 10–20)
BUN SERPL-MCNC: 60 MG/DL — HIGH (ref 10–20)
BUN SERPL-MCNC: 62 MG/DL — CRITICAL HIGH (ref 10–20)
BUN SERPL-MCNC: 63 MG/DL — CRITICAL HIGH (ref 10–20)
BUN SERPL-MCNC: 64 MG/DL — CRITICAL HIGH (ref 10–20)
BUN SERPL-MCNC: 68 MG/DL — CRITICAL HIGH (ref 10–20)
BUN SERPL-MCNC: 71 MG/DL — CRITICAL HIGH (ref 10–20)
BUN SERPL-MCNC: 72 MG/DL — CRITICAL HIGH (ref 10–20)
BUN SERPL-MCNC: 72 MG/DL — CRITICAL HIGH (ref 10–20)
BUN SERPL-MCNC: 77 MG/DL — CRITICAL HIGH (ref 10–20)
BUN SERPL-MCNC: 82 MG/DL — CRITICAL HIGH (ref 10–20)
BUN SERPL-MCNC: 83 MG/DL — CRITICAL HIGH (ref 10–20)
BUN SERPL-MCNC: 85 MG/DL — CRITICAL HIGH (ref 10–20)
BUN SERPL-MCNC: 88 MG/DL — CRITICAL HIGH (ref 10–20)
BUN SERPL-MCNC: SIGNIFICANT CHANGE UP MG/DL (ref 10–20)
C DIFF GDH STL QL: NEGATIVE — SIGNIFICANT CHANGE UP
C DIFF GDH STL QL: SIGNIFICANT CHANGE UP
CA-I SERPL-SCNC: 1.19 MMOL/L — SIGNIFICANT CHANGE UP (ref 1.15–1.33)
CALCIUM SERPL-MCNC: 8 MG/DL — LOW (ref 8.4–10.5)
CALCIUM SERPL-MCNC: 8.1 MG/DL — LOW (ref 8.4–10.5)
CALCIUM SERPL-MCNC: 8.3 MG/DL — LOW (ref 8.4–10.5)
CALCIUM SERPL-MCNC: 8.5 MG/DL — SIGNIFICANT CHANGE UP (ref 8.4–10.5)
CALCIUM SERPL-MCNC: 8.6 MG/DL — SIGNIFICANT CHANGE UP (ref 8.4–10.5)
CALCIUM SERPL-MCNC: 8.7 MG/DL — SIGNIFICANT CHANGE UP (ref 8.4–10.5)
CALCIUM SERPL-MCNC: 8.8 MG/DL — SIGNIFICANT CHANGE UP (ref 8.4–10.5)
CALCIUM SERPL-MCNC: 8.8 MG/DL — SIGNIFICANT CHANGE UP (ref 8.4–10.5)
CALCIUM SERPL-MCNC: 9.2 MG/DL — SIGNIFICANT CHANGE UP (ref 8.4–10.5)
CALCIUM SERPL-MCNC: 9.3 MG/DL — SIGNIFICANT CHANGE UP (ref 8.4–10.5)
CALCIUM SERPL-MCNC: 9.4 MG/DL — SIGNIFICANT CHANGE UP (ref 8.4–10.5)
CALCIUM SERPL-MCNC: 9.4 MG/DL — SIGNIFICANT CHANGE UP (ref 8.4–10.5)
CALCIUM SERPL-MCNC: 9.5 MG/DL — SIGNIFICANT CHANGE UP (ref 8.4–10.5)
CALCIUM SERPL-MCNC: 9.5 MG/DL — SIGNIFICANT CHANGE UP (ref 8.4–10.5)
CALCIUM SERPL-MCNC: SIGNIFICANT CHANGE UP MG/DL (ref 8.4–10.5)
CHLORIDE SERPL-SCNC: 101 MMOL/L — SIGNIFICANT CHANGE UP (ref 98–110)
CHLORIDE SERPL-SCNC: 102 MMOL/L — SIGNIFICANT CHANGE UP (ref 98–110)
CHLORIDE SERPL-SCNC: 103 MMOL/L — SIGNIFICANT CHANGE UP (ref 98–110)
CHLORIDE SERPL-SCNC: 104 MMOL/L — SIGNIFICANT CHANGE UP (ref 98–110)
CHLORIDE SERPL-SCNC: 105 MMOL/L — SIGNIFICANT CHANGE UP (ref 98–110)
CHLORIDE SERPL-SCNC: 106 MMOL/L — SIGNIFICANT CHANGE UP (ref 98–110)
CHLORIDE SERPL-SCNC: 107 MMOL/L — SIGNIFICANT CHANGE UP (ref 98–110)
CHLORIDE SERPL-SCNC: 108 MMOL/L — SIGNIFICANT CHANGE UP (ref 98–110)
CHLORIDE SERPL-SCNC: 109 MMOL/L — SIGNIFICANT CHANGE UP (ref 98–110)
CHLORIDE SERPL-SCNC: 110 MMOL/L — SIGNIFICANT CHANGE UP (ref 98–110)
CHLORIDE SERPL-SCNC: 111 MMOL/L — HIGH (ref 98–110)
CHLORIDE SERPL-SCNC: 113 MMOL/L — HIGH (ref 98–110)
CHLORIDE SERPL-SCNC: 115 MMOL/L — HIGH (ref 98–110)
CHLORIDE SERPL-SCNC: 99 MMOL/L — SIGNIFICANT CHANGE UP (ref 98–110)
CHLORIDE SERPL-SCNC: SIGNIFICANT CHANGE UP MMOL/L (ref 98–110)
CHOLEST SERPL-MCNC: 213 MG/DL — HIGH
CK SERPL-CCNC: 60 U/L — SIGNIFICANT CHANGE UP (ref 0–225)
CO2 SERPL-SCNC: 18 MMOL/L — SIGNIFICANT CHANGE UP (ref 17–32)
CO2 SERPL-SCNC: 19 MMOL/L — SIGNIFICANT CHANGE UP (ref 17–32)
CO2 SERPL-SCNC: 21 MMOL/L — SIGNIFICANT CHANGE UP (ref 17–32)
CO2 SERPL-SCNC: 21 MMOL/L — SIGNIFICANT CHANGE UP (ref 17–32)
CO2 SERPL-SCNC: 22 MMOL/L — SIGNIFICANT CHANGE UP (ref 17–32)
CO2 SERPL-SCNC: 22 MMOL/L — SIGNIFICANT CHANGE UP (ref 17–32)
CO2 SERPL-SCNC: 23 MMOL/L — SIGNIFICANT CHANGE UP (ref 17–32)
CO2 SERPL-SCNC: 24 MMOL/L — SIGNIFICANT CHANGE UP (ref 17–32)
CO2 SERPL-SCNC: 24 MMOL/L — SIGNIFICANT CHANGE UP (ref 17–32)
CO2 SERPL-SCNC: 25 MMOL/L — SIGNIFICANT CHANGE UP (ref 17–32)
CO2 SERPL-SCNC: 26 MMOL/L — SIGNIFICANT CHANGE UP (ref 17–32)
CO2 SERPL-SCNC: 27 MMOL/L — SIGNIFICANT CHANGE UP (ref 17–32)
CO2 SERPL-SCNC: 28 MMOL/L — SIGNIFICANT CHANGE UP (ref 17–32)
CO2 SERPL-SCNC: 29 MMOL/L — SIGNIFICANT CHANGE UP (ref 17–32)
CO2 SERPL-SCNC: SIGNIFICANT CHANGE UP MMOL/L (ref 17–32)
CREAT SERPL-MCNC: 1.1 MG/DL — SIGNIFICANT CHANGE UP (ref 0.7–1.5)
CREAT SERPL-MCNC: 1.2 MG/DL — SIGNIFICANT CHANGE UP (ref 0.7–1.5)
CREAT SERPL-MCNC: 1.3 MG/DL — SIGNIFICANT CHANGE UP (ref 0.7–1.5)
CREAT SERPL-MCNC: 1.4 MG/DL — SIGNIFICANT CHANGE UP (ref 0.7–1.5)
CREAT SERPL-MCNC: 1.5 MG/DL — SIGNIFICANT CHANGE UP (ref 0.7–1.5)
CREAT SERPL-MCNC: 1.6 MG/DL — HIGH (ref 0.7–1.5)
CREAT SERPL-MCNC: 1.7 MG/DL — HIGH (ref 0.7–1.5)
CREAT SERPL-MCNC: 1.8 MG/DL — HIGH (ref 0.7–1.5)
CREAT SERPL-MCNC: 1.8 MG/DL — HIGH (ref 0.7–1.5)
CREAT SERPL-MCNC: 1.9 MG/DL — HIGH (ref 0.7–1.5)
CREAT SERPL-MCNC: 1.9 MG/DL — HIGH (ref 0.7–1.5)
CREAT SERPL-MCNC: 2 MG/DL — HIGH (ref 0.7–1.5)
CREAT SERPL-MCNC: 2.1 MG/DL — HIGH (ref 0.7–1.5)
CREAT SERPL-MCNC: 2.2 MG/DL — HIGH (ref 0.7–1.5)
CREAT SERPL-MCNC: 2.3 MG/DL — HIGH (ref 0.7–1.5)
CREAT SERPL-MCNC: 2.3 MG/DL — HIGH (ref 0.7–1.5)
CREAT SERPL-MCNC: SIGNIFICANT CHANGE UP MG/DL (ref 0.7–1.5)
CULTURE RESULTS: ABNORMAL
CULTURE RESULTS: SIGNIFICANT CHANGE UP
EGFR: 30 ML/MIN/1.73M2 — LOW
EGFR: 31 ML/MIN/1.73M2 — LOW
EGFR: 31 ML/MIN/1.73M2 — LOW
EGFR: 33 ML/MIN/1.73M2 — LOW
EGFR: 33 ML/MIN/1.73M2 — LOW
EGFR: 35 ML/MIN/1.73M2 — LOW
EGFR: 35 ML/MIN/1.73M2 — LOW
EGFR: 37 ML/MIN/1.73M2 — LOW
EGFR: 37 ML/MIN/1.73M2 — LOW
EGFR: 38 ML/MIN/1.73M2 — LOW
EGFR: 40 ML/MIN/1.73M2 — LOW
EGFR: 43 ML/MIN/1.73M2 — LOW
EGFR: 46 ML/MIN/1.73M2 — LOW
EGFR: 46 ML/MIN/1.73M2 — LOW
EGFR: 50 ML/MIN/1.73M2 — LOW
EGFR: 54 ML/MIN/1.73M2 — LOW
EGFR: 59 ML/MIN/1.73M2 — LOW
EGFR: 65 ML/MIN/1.73M2 — SIGNIFICANT CHANGE UP
EGFR: 65 ML/MIN/1.73M2 — SIGNIFICANT CHANGE UP
EGFR: 72 ML/MIN/1.73M2 — SIGNIFICANT CHANGE UP
EGFR: SIGNIFICANT CHANGE UP ML/MIN/1.73M2
EGFR: SIGNIFICANT CHANGE UP ML/MIN/1.73M2
EOSINOPHIL # BLD AUTO: 0 K/UL — SIGNIFICANT CHANGE UP (ref 0–0.7)
EOSINOPHIL # BLD AUTO: 0.01 K/UL — SIGNIFICANT CHANGE UP (ref 0–0.5)
EOSINOPHIL # BLD AUTO: 0.02 K/UL — SIGNIFICANT CHANGE UP (ref 0–0.7)
EOSINOPHIL # BLD AUTO: 0.05 K/UL — SIGNIFICANT CHANGE UP (ref 0–0.5)
EOSINOPHIL # BLD AUTO: 0.07 K/UL — SIGNIFICANT CHANGE UP (ref 0–0.5)
EOSINOPHIL # BLD AUTO: 0.08 K/UL — SIGNIFICANT CHANGE UP (ref 0–0.7)
EOSINOPHIL # BLD AUTO: 0.1 K/UL — SIGNIFICANT CHANGE UP (ref 0–0.5)
EOSINOPHIL # BLD AUTO: 0.11 K/UL — SIGNIFICANT CHANGE UP (ref 0–0.5)
EOSINOPHIL # BLD AUTO: 0.12 K/UL — SIGNIFICANT CHANGE UP (ref 0–0.5)
EOSINOPHIL # BLD AUTO: 0.19 K/UL — SIGNIFICANT CHANGE UP (ref 0–0.5)
EOSINOPHIL # BLD AUTO: 0.19 K/UL — SIGNIFICANT CHANGE UP (ref 0–0.5)
EOSINOPHIL # BLD AUTO: 0.34 K/UL — SIGNIFICANT CHANGE UP (ref 0–0.7)
EOSINOPHIL # BLD AUTO: 0.35 K/UL — SIGNIFICANT CHANGE UP (ref 0–0.5)
EOSINOPHIL # BLD AUTO: 0.38 K/UL — SIGNIFICANT CHANGE UP (ref 0–0.5)
EOSINOPHIL # BLD AUTO: 0.39 K/UL — SIGNIFICANT CHANGE UP (ref 0–0.5)
EOSINOPHIL # BLD AUTO: 0.45 K/UL — SIGNIFICANT CHANGE UP (ref 0–0.5)
EOSINOPHIL NFR BLD AUTO: 0 % — SIGNIFICANT CHANGE UP (ref 0–6)
EOSINOPHIL NFR BLD AUTO: 0 % — SIGNIFICANT CHANGE UP (ref 0–8)
EOSINOPHIL NFR BLD AUTO: 0.1 % — SIGNIFICANT CHANGE UP (ref 0–8)
EOSINOPHIL NFR BLD AUTO: 0.2 % — SIGNIFICANT CHANGE UP (ref 0–6)
EOSINOPHIL NFR BLD AUTO: 0.4 % — SIGNIFICANT CHANGE UP (ref 0–8)
EOSINOPHIL NFR BLD AUTO: 0.6 % — SIGNIFICANT CHANGE UP (ref 0–6)
EOSINOPHIL NFR BLD AUTO: 0.8 % — SIGNIFICANT CHANGE UP (ref 0–6)
EOSINOPHIL NFR BLD AUTO: 0.8 % — SIGNIFICANT CHANGE UP (ref 0–6)
EOSINOPHIL NFR BLD AUTO: 1.2 % — SIGNIFICANT CHANGE UP (ref 0–6)
EOSINOPHIL NFR BLD AUTO: 1.8 % — SIGNIFICANT CHANGE UP (ref 0–6)
EOSINOPHIL NFR BLD AUTO: 2 % — SIGNIFICANT CHANGE UP (ref 0–6)
EOSINOPHIL NFR BLD AUTO: 2 % — SIGNIFICANT CHANGE UP (ref 0–6)
EOSINOPHIL NFR BLD AUTO: 2 % — SIGNIFICANT CHANGE UP (ref 0–8)
EOSINOPHIL NFR BLD AUTO: 2.5 % — SIGNIFICANT CHANGE UP (ref 0–6)
ERYTHROCYTE [SEDIMENTATION RATE] IN BLOOD: 7 MM/HR — SIGNIFICANT CHANGE UP (ref 0–15)
ESTIMATED AVERAGE GLUCOSE: 206 MG/DL — HIGH (ref 68–114)
FERRITIN SERPL-MCNC: 265 NG/ML — SIGNIFICANT CHANGE UP (ref 30–400)
FOLATE SERPL-MCNC: 11.9 NG/ML — SIGNIFICANT CHANGE UP
FUNGITELL: 108 PG/ML — HIGH
GALACTOMANNAN AG SERPL-ACNC: 0.06 INDEX — SIGNIFICANT CHANGE UP (ref 0–0.49)
GAS PNL BLDA: SIGNIFICANT CHANGE UP
GAS PNL BLDV: 134 MMOL/L — LOW (ref 136–145)
GAS PNL BLDV: SIGNIFICANT CHANGE UP
GLUCOSE BLDC GLUCOMTR-MCNC: 109 MG/DL — HIGH (ref 70–99)
GLUCOSE BLDC GLUCOMTR-MCNC: 110 MG/DL — HIGH (ref 70–99)
GLUCOSE BLDC GLUCOMTR-MCNC: 115 MG/DL — HIGH (ref 70–99)
GLUCOSE BLDC GLUCOMTR-MCNC: 118 MG/DL — HIGH (ref 70–99)
GLUCOSE BLDC GLUCOMTR-MCNC: 126 MG/DL — HIGH (ref 70–99)
GLUCOSE BLDC GLUCOMTR-MCNC: 127 MG/DL — HIGH (ref 70–99)
GLUCOSE BLDC GLUCOMTR-MCNC: 137 MG/DL — HIGH (ref 70–99)
GLUCOSE BLDC GLUCOMTR-MCNC: 139 MG/DL — HIGH (ref 70–99)
GLUCOSE BLDC GLUCOMTR-MCNC: 141 MG/DL — HIGH (ref 70–99)
GLUCOSE BLDC GLUCOMTR-MCNC: 142 MG/DL — HIGH (ref 70–99)
GLUCOSE BLDC GLUCOMTR-MCNC: 143 MG/DL — HIGH (ref 70–99)
GLUCOSE BLDC GLUCOMTR-MCNC: 146 MG/DL — HIGH (ref 70–99)
GLUCOSE BLDC GLUCOMTR-MCNC: 150 MG/DL — HIGH (ref 70–99)
GLUCOSE BLDC GLUCOMTR-MCNC: 155 MG/DL — HIGH (ref 70–99)
GLUCOSE BLDC GLUCOMTR-MCNC: 163 MG/DL — HIGH (ref 70–99)
GLUCOSE BLDC GLUCOMTR-MCNC: 164 MG/DL — HIGH (ref 70–99)
GLUCOSE BLDC GLUCOMTR-MCNC: 170 MG/DL — HIGH (ref 70–99)
GLUCOSE BLDC GLUCOMTR-MCNC: 175 MG/DL — HIGH (ref 70–99)
GLUCOSE BLDC GLUCOMTR-MCNC: 177 MG/DL — HIGH (ref 70–99)
GLUCOSE BLDC GLUCOMTR-MCNC: 177 MG/DL — HIGH (ref 70–99)
GLUCOSE BLDC GLUCOMTR-MCNC: 178 MG/DL — HIGH (ref 70–99)
GLUCOSE BLDC GLUCOMTR-MCNC: 179 MG/DL — HIGH (ref 70–99)
GLUCOSE BLDC GLUCOMTR-MCNC: 180 MG/DL — HIGH (ref 70–99)
GLUCOSE BLDC GLUCOMTR-MCNC: 181 MG/DL — HIGH (ref 70–99)
GLUCOSE BLDC GLUCOMTR-MCNC: 181 MG/DL — HIGH (ref 70–99)
GLUCOSE BLDC GLUCOMTR-MCNC: 187 MG/DL — HIGH (ref 70–99)
GLUCOSE BLDC GLUCOMTR-MCNC: 188 MG/DL — HIGH (ref 70–99)
GLUCOSE BLDC GLUCOMTR-MCNC: 189 MG/DL — HIGH (ref 70–99)
GLUCOSE BLDC GLUCOMTR-MCNC: 190 MG/DL — HIGH (ref 70–99)
GLUCOSE BLDC GLUCOMTR-MCNC: 190 MG/DL — HIGH (ref 70–99)
GLUCOSE BLDC GLUCOMTR-MCNC: 192 MG/DL — HIGH (ref 70–99)
GLUCOSE BLDC GLUCOMTR-MCNC: 195 MG/DL — HIGH (ref 70–99)
GLUCOSE BLDC GLUCOMTR-MCNC: 195 MG/DL — HIGH (ref 70–99)
GLUCOSE BLDC GLUCOMTR-MCNC: 196 MG/DL — HIGH (ref 70–99)
GLUCOSE BLDC GLUCOMTR-MCNC: 201 MG/DL — HIGH (ref 70–99)
GLUCOSE BLDC GLUCOMTR-MCNC: 204 MG/DL — HIGH (ref 70–99)
GLUCOSE BLDC GLUCOMTR-MCNC: 209 MG/DL — HIGH (ref 70–99)
GLUCOSE BLDC GLUCOMTR-MCNC: 213 MG/DL — HIGH (ref 70–99)
GLUCOSE BLDC GLUCOMTR-MCNC: 215 MG/DL — HIGH (ref 70–99)
GLUCOSE BLDC GLUCOMTR-MCNC: 215 MG/DL — HIGH (ref 70–99)
GLUCOSE BLDC GLUCOMTR-MCNC: 217 MG/DL — HIGH (ref 70–99)
GLUCOSE BLDC GLUCOMTR-MCNC: 221 MG/DL — HIGH (ref 70–99)
GLUCOSE BLDC GLUCOMTR-MCNC: 222 MG/DL — HIGH (ref 70–99)
GLUCOSE BLDC GLUCOMTR-MCNC: 223 MG/DL — HIGH (ref 70–99)
GLUCOSE BLDC GLUCOMTR-MCNC: 225 MG/DL — HIGH (ref 70–99)
GLUCOSE BLDC GLUCOMTR-MCNC: 230 MG/DL — HIGH (ref 70–99)
GLUCOSE BLDC GLUCOMTR-MCNC: 231 MG/DL — HIGH (ref 70–99)
GLUCOSE BLDC GLUCOMTR-MCNC: 233 MG/DL — HIGH (ref 70–99)
GLUCOSE BLDC GLUCOMTR-MCNC: 236 MG/DL — HIGH (ref 70–99)
GLUCOSE BLDC GLUCOMTR-MCNC: 237 MG/DL — HIGH (ref 70–99)
GLUCOSE BLDC GLUCOMTR-MCNC: 239 MG/DL — HIGH (ref 70–99)
GLUCOSE BLDC GLUCOMTR-MCNC: 240 MG/DL — HIGH (ref 70–99)
GLUCOSE BLDC GLUCOMTR-MCNC: 250 MG/DL — HIGH (ref 70–99)
GLUCOSE BLDC GLUCOMTR-MCNC: 254 MG/DL — HIGH (ref 70–99)
GLUCOSE BLDC GLUCOMTR-MCNC: 254 MG/DL — HIGH (ref 70–99)
GLUCOSE BLDC GLUCOMTR-MCNC: 258 MG/DL — HIGH (ref 70–99)
GLUCOSE BLDC GLUCOMTR-MCNC: 264 MG/DL — HIGH (ref 70–99)
GLUCOSE BLDC GLUCOMTR-MCNC: 278 MG/DL — HIGH (ref 70–99)
GLUCOSE BLDC GLUCOMTR-MCNC: 280 MG/DL — HIGH (ref 70–99)
GLUCOSE BLDC GLUCOMTR-MCNC: 293 MG/DL — HIGH (ref 70–99)
GLUCOSE BLDC GLUCOMTR-MCNC: 293 MG/DL — HIGH (ref 70–99)
GLUCOSE BLDC GLUCOMTR-MCNC: 297 MG/DL — HIGH (ref 70–99)
GLUCOSE BLDC GLUCOMTR-MCNC: 304 MG/DL — HIGH (ref 70–99)
GLUCOSE BLDC GLUCOMTR-MCNC: 312 MG/DL — HIGH (ref 70–99)
GLUCOSE BLDC GLUCOMTR-MCNC: 317 MG/DL — HIGH (ref 70–99)
GLUCOSE BLDC GLUCOMTR-MCNC: 324 MG/DL — HIGH (ref 70–99)
GLUCOSE BLDC GLUCOMTR-MCNC: 337 MG/DL — HIGH (ref 70–99)
GLUCOSE BLDC GLUCOMTR-MCNC: 346 MG/DL — HIGH (ref 70–99)
GLUCOSE BLDC GLUCOMTR-MCNC: 405 MG/DL — HIGH (ref 70–99)
GLUCOSE BLDC GLUCOMTR-MCNC: 420 MG/DL — HIGH (ref 70–99)
GLUCOSE BLDC GLUCOMTR-MCNC: 453 MG/DL — CRITICAL HIGH (ref 70–99)
GLUCOSE BLDC GLUCOMTR-MCNC: 457 MG/DL — CRITICAL HIGH (ref 70–99)
GLUCOSE BLDC GLUCOMTR-MCNC: 68 MG/DL — LOW (ref 70–99)
GLUCOSE BLDC GLUCOMTR-MCNC: 68 MG/DL — LOW (ref 70–99)
GLUCOSE BLDC GLUCOMTR-MCNC: 69 MG/DL — LOW (ref 70–99)
GLUCOSE BLDC GLUCOMTR-MCNC: 85 MG/DL — SIGNIFICANT CHANGE UP (ref 70–99)
GLUCOSE BLDC GLUCOMTR-MCNC: 91 MG/DL — SIGNIFICANT CHANGE UP (ref 70–99)
GLUCOSE SERPL-MCNC: 110 MG/DL — HIGH (ref 70–99)
GLUCOSE SERPL-MCNC: 114 MG/DL — HIGH (ref 70–99)
GLUCOSE SERPL-MCNC: 136 MG/DL — HIGH (ref 70–99)
GLUCOSE SERPL-MCNC: 156 MG/DL — HIGH (ref 70–99)
GLUCOSE SERPL-MCNC: 167 MG/DL — HIGH (ref 70–99)
GLUCOSE SERPL-MCNC: 171 MG/DL — HIGH (ref 70–99)
GLUCOSE SERPL-MCNC: 172 MG/DL — HIGH (ref 70–99)
GLUCOSE SERPL-MCNC: 175 MG/DL — HIGH (ref 70–99)
GLUCOSE SERPL-MCNC: 175 MG/DL — HIGH (ref 70–99)
GLUCOSE SERPL-MCNC: 177 MG/DL — HIGH (ref 70–99)
GLUCOSE SERPL-MCNC: 189 MG/DL — HIGH (ref 70–99)
GLUCOSE SERPL-MCNC: 201 MG/DL — HIGH (ref 70–99)
GLUCOSE SERPL-MCNC: 219 MG/DL — HIGH (ref 70–99)
GLUCOSE SERPL-MCNC: 219 MG/DL — HIGH (ref 70–99)
GLUCOSE SERPL-MCNC: 225 MG/DL — HIGH (ref 70–99)
GLUCOSE SERPL-MCNC: 226 MG/DL — HIGH (ref 70–99)
GLUCOSE SERPL-MCNC: 229 MG/DL — HIGH (ref 70–99)
GLUCOSE SERPL-MCNC: 234 MG/DL — HIGH (ref 70–99)
GLUCOSE SERPL-MCNC: 240 MG/DL — HIGH (ref 70–99)
GLUCOSE SERPL-MCNC: 242 MG/DL — HIGH (ref 70–99)
GLUCOSE SERPL-MCNC: 260 MG/DL — HIGH (ref 70–99)
GLUCOSE SERPL-MCNC: 266 MG/DL — HIGH (ref 70–99)
GLUCOSE SERPL-MCNC: 283 MG/DL — HIGH (ref 70–99)
GLUCOSE SERPL-MCNC: 320 MG/DL — HIGH (ref 70–99)
GLUCOSE SERPL-MCNC: 329 MG/DL — HIGH (ref 70–99)
GLUCOSE SERPL-MCNC: 331 MG/DL — HIGH (ref 70–99)
GLUCOSE SERPL-MCNC: 334 MG/DL — HIGH (ref 70–99)
GLUCOSE SERPL-MCNC: 407 MG/DL — HIGH (ref 70–99)
GLUCOSE SERPL-MCNC: SIGNIFICANT CHANGE UP MG/DL (ref 70–99)
GRAM STN FLD: ABNORMAL
GRAM STN FLD: SIGNIFICANT CHANGE UP
HCO3 BLDA-SCNC: 22 MMOL/L — SIGNIFICANT CHANGE UP (ref 21–28)
HCO3 BLDA-SCNC: 24 MMOL/L — SIGNIFICANT CHANGE UP (ref 21–28)
HCO3 BLDA-SCNC: 26 MMOL/L — SIGNIFICANT CHANGE UP (ref 21–28)
HCO3 BLDA-SCNC: 28 MMOL/L — SIGNIFICANT CHANGE UP (ref 21–28)
HCO3 BLDA-SCNC: 29 MMOL/L — HIGH (ref 21–28)
HCO3 BLDA-SCNC: 30 MMOL/L — HIGH (ref 21–28)
HCO3 BLDA-SCNC: 30 MMOL/L — HIGH (ref 21–28)
HCO3 BLDA-SCNC: 31 MMOL/L — HIGH (ref 21–28)
HCO3 BLDA-SCNC: 32 MMOL/L — HIGH (ref 21–28)
HCO3 BLDV-SCNC: 29 MMOL/L — SIGNIFICANT CHANGE UP (ref 22–29)
HCT VFR BLD CALC: 27.4 % — LOW (ref 39–50)
HCT VFR BLD CALC: 28.5 % — LOW (ref 39–50)
HCT VFR BLD CALC: 28.6 % — LOW (ref 39–50)
HCT VFR BLD CALC: 28.7 % — LOW (ref 39–50)
HCT VFR BLD CALC: 29.2 % — LOW (ref 39–50)
HCT VFR BLD CALC: 29.3 % — LOW (ref 39–50)
HCT VFR BLD CALC: 30.9 % — LOW (ref 39–50)
HCT VFR BLD CALC: 31.6 % — LOW (ref 39–50)
HCT VFR BLD CALC: 31.8 % — LOW (ref 39–50)
HCT VFR BLD CALC: 32 % — LOW (ref 39–50)
HCT VFR BLD CALC: 32.8 % — LOW (ref 39–50)
HCT VFR BLD CALC: 32.9 % — LOW (ref 39–50)
HCT VFR BLD CALC: 33.2 % — LOW (ref 39–50)
HCT VFR BLD CALC: 33.7 % — LOW (ref 39–50)
HCT VFR BLD CALC: 34.7 % — LOW (ref 39–50)
HCT VFR BLD CALC: 35.2 % — LOW (ref 39–50)
HCT VFR BLD CALC: 36.7 % — LOW (ref 39–50)
HCT VFR BLD CALC: 37.2 % — LOW (ref 39–50)
HCT VFR BLD CALC: 38.8 % — LOW (ref 39–50)
HCT VFR BLD CALC: 39.1 % — LOW (ref 42–52)
HCT VFR BLD CALC: 39.5 % — LOW (ref 42–52)
HCT VFR BLD CALC: 40.3 % — LOW (ref 42–52)
HCT VFR BLD CALC: 43.2 % — SIGNIFICANT CHANGE UP (ref 42–52)
HCT VFR BLD CALC: 43.8 % — SIGNIFICANT CHANGE UP (ref 42–52)
HCT VFR BLD CALC: 44.5 % — SIGNIFICANT CHANGE UP (ref 42–52)
HCT VFR BLD CALC: 44.8 % — SIGNIFICANT CHANGE UP (ref 42–52)
HCT VFR BLD CALC: 47.6 % — SIGNIFICANT CHANGE UP (ref 42–52)
HCT VFR BLDA CALC: 38 % — LOW (ref 39–51)
HDLC SERPL-MCNC: 52 MG/DL — SIGNIFICANT CHANGE UP
HGB BLD CALC-MCNC: 12.7 G/DL — SIGNIFICANT CHANGE UP (ref 12.6–17.4)
HGB BLD-MCNC: 10 G/DL — LOW (ref 13–17)
HGB BLD-MCNC: 10 G/DL — LOW (ref 13–17)
HGB BLD-MCNC: 10.1 G/DL — LOW (ref 13–17)
HGB BLD-MCNC: 10.9 G/DL — LOW (ref 13–17)
HGB BLD-MCNC: 11.1 G/DL — LOW (ref 13–17)
HGB BLD-MCNC: 11.5 G/DL — LOW (ref 13–17)
HGB BLD-MCNC: 11.5 G/DL — LOW (ref 14–18)
HGB BLD-MCNC: 12 G/DL — LOW (ref 14–18)
HGB BLD-MCNC: 12.5 G/DL — LOW (ref 14–18)
HGB BLD-MCNC: 12.8 G/DL — LOW (ref 14–18)
HGB BLD-MCNC: 13.2 G/DL — LOW (ref 14–18)
HGB BLD-MCNC: 13.3 G/DL — LOW (ref 14–18)
HGB BLD-MCNC: 13.4 G/DL — LOW (ref 14–18)
HGB BLD-MCNC: 15.3 G/DL — SIGNIFICANT CHANGE UP (ref 14–18)
HGB BLD-MCNC: 8 G/DL — LOW (ref 13–17)
HGB BLD-MCNC: 8.2 G/DL — LOW (ref 13–17)
HGB BLD-MCNC: 8.3 G/DL — LOW (ref 13–17)
HGB BLD-MCNC: 8.3 G/DL — LOW (ref 13–17)
HGB BLD-MCNC: 8.5 G/DL — LOW (ref 13–17)
HGB BLD-MCNC: 8.8 G/DL — LOW (ref 13–17)
HGB BLD-MCNC: 8.8 G/DL — LOW (ref 13–17)
HGB BLD-MCNC: 8.9 G/DL — LOW (ref 13–17)
HGB BLD-MCNC: 9.2 G/DL — LOW (ref 13–17)
HGB BLD-MCNC: 9.5 G/DL — LOW (ref 13–17)
HGB BLD-MCNC: 9.6 G/DL — LOW (ref 13–17)
HGB BLD-MCNC: 9.8 G/DL — LOW (ref 13–17)
HGB BLD-MCNC: 9.9 G/DL — LOW (ref 13–17)
HIV 1+2 AB+HIV1 P24 AG SERPL QL IA: SIGNIFICANT CHANGE UP
HOROWITZ INDEX BLDA+IHG-RTO: 40 — SIGNIFICANT CHANGE UP
HOROWITZ INDEX BLDA+IHG-RTO: 50 — SIGNIFICANT CHANGE UP
IMM GRANULOCYTES # BLD AUTO: 0.07 K/UL — SIGNIFICANT CHANGE UP (ref 0–0.07)
IMM GRANULOCYTES # BLD AUTO: 0.09 K/UL — HIGH (ref 0–0.07)
IMM GRANULOCYTES # BLD AUTO: 0.09 K/UL — HIGH (ref 0–0.07)
IMM GRANULOCYTES # BLD AUTO: 0.15 K/UL — HIGH (ref 0–0.07)
IMM GRANULOCYTES # BLD AUTO: 0.17 K/UL — HIGH (ref 0–0.07)
IMM GRANULOCYTES # BLD AUTO: 0.21 K/UL — HIGH (ref 0–0.07)
IMM GRANULOCYTES # BLD AUTO: 0.25 K/UL — HIGH (ref 0–0.07)
IMM GRANULOCYTES # BLD AUTO: 0.28 K/UL — HIGH (ref 0–0.07)
IMM GRANULOCYTES # BLD AUTO: 0.3 K/UL — HIGH (ref 0–0.07)
IMM GRANULOCYTES # BLD AUTO: 0.36 K/UL — HIGH (ref 0–0.07)
IMM GRANULOCYTES # BLD AUTO: 0.43 K/UL — HIGH (ref 0–0.07)
IMM GRANULOCYTES # BLD AUTO: 0.45 K/UL — HIGH (ref 0–0.07)
IMM GRANULOCYTES NFR BLD AUTO: 0.5 % — SIGNIFICANT CHANGE UP (ref 0–0.9)
IMM GRANULOCYTES NFR BLD AUTO: 0.6 % — HIGH (ref 0.1–0.3)
IMM GRANULOCYTES NFR BLD AUTO: 0.6 % — SIGNIFICANT CHANGE UP (ref 0–0.9)
IMM GRANULOCYTES NFR BLD AUTO: 0.8 % — HIGH (ref 0.1–0.3)
IMM GRANULOCYTES NFR BLD AUTO: 0.8 % — HIGH (ref 0.1–0.3)
IMM GRANULOCYTES NFR BLD AUTO: 0.8 % — SIGNIFICANT CHANGE UP (ref 0–0.9)
IMM GRANULOCYTES NFR BLD AUTO: 0.9 % — SIGNIFICANT CHANGE UP (ref 0–0.9)
IMM GRANULOCYTES NFR BLD AUTO: 1 % — HIGH (ref 0.1–0.3)
IMM GRANULOCYTES NFR BLD AUTO: 1.1 % — HIGH (ref 0–0.9)
IMM GRANULOCYTES NFR BLD AUTO: 1.2 % — HIGH (ref 0–0.9)
IMM GRANULOCYTES NFR BLD AUTO: 1.3 % — HIGH (ref 0.1–0.3)
IMM GRANULOCYTES NFR BLD AUTO: 1.5 % — HIGH (ref 0.1–0.3)
IMM GRANULOCYTES NFR BLD AUTO: 1.5 % — HIGH (ref 0.1–0.3)
IMM GRANULOCYTES NFR BLD AUTO: 1.5 % — HIGH (ref 0–0.9)
IMM GRANULOCYTES NFR BLD AUTO: 1.9 % — HIGH (ref 0–0.9)
IMM GRANULOCYTES NFR BLD AUTO: 2.1 % — HIGH (ref 0–0.9)
IMM GRANULOCYTES NFR BLD AUTO: 2.3 % — HIGH (ref 0–0.9)
INR BLD: 1.59 RATIO — HIGH (ref 0.65–1.3)
INR BLD: 1.78 RATIO — HIGH (ref 0.65–1.3)
INR BLD: 2.46 RATIO — HIGH (ref 0.65–1.3)
IRON SATN MFR SERPL: 11 % — LOW (ref 15–50)
IRON SATN MFR SERPL: 23 UG/DL — LOW (ref 35–150)
LACTATE BLDV-MCNC: 2 MMOL/L — SIGNIFICANT CHANGE UP (ref 0.5–2)
LACTATE SERPL-SCNC: 1.6 MMOL/L — SIGNIFICANT CHANGE UP (ref 0.7–2)
LACTATE SERPL-SCNC: 3.1 MMOL/L — HIGH (ref 0.7–2)
LDH SERPL L TO P-CCNC: 375 U/L — HIGH (ref 50–242)
LDLC SERPL-MCNC: 141 MG/DL — HIGH
LIPID PNL WITH DIRECT LDL SERPL: 141 MG/DL — HIGH
LYMPHOCYTES # BLD AUTO: 0.36 K/UL — LOW (ref 1.2–3.4)
LYMPHOCYTES # BLD AUTO: 0.54 K/UL — LOW (ref 1–3.3)
LYMPHOCYTES # BLD AUTO: 0.68 K/UL — LOW (ref 1.2–3.4)
LYMPHOCYTES # BLD AUTO: 0.68 K/UL — LOW (ref 1–3.3)
LYMPHOCYTES # BLD AUTO: 0.79 K/UL — LOW (ref 1–3.3)
LYMPHOCYTES # BLD AUTO: 0.8 % — LOW (ref 20.5–51.1)
LYMPHOCYTES # BLD AUTO: 0.81 K/UL — LOW (ref 1.2–3.4)
LYMPHOCYTES # BLD AUTO: 0.85 K/UL — LOW (ref 1–3.3)
LYMPHOCYTES # BLD AUTO: 0.88 K/UL — LOW (ref 1–3.3)
LYMPHOCYTES # BLD AUTO: 0.88 K/UL — LOW (ref 1–3.3)
LYMPHOCYTES # BLD AUTO: 0.93 K/UL — LOW (ref 1–3.3)
LYMPHOCYTES # BLD AUTO: 0.99 K/UL — LOW (ref 1–3.3)
LYMPHOCYTES # BLD AUTO: 1.05 K/UL — LOW (ref 1.2–3.4)
LYMPHOCYTES # BLD AUTO: 1.08 K/UL — SIGNIFICANT CHANGE UP (ref 1–3.3)
LYMPHOCYTES # BLD AUTO: 1.11 K/UL — SIGNIFICANT CHANGE UP (ref 1–3.3)
LYMPHOCYTES # BLD AUTO: 1.16 K/UL — LOW (ref 1.2–3.4)
LYMPHOCYTES # BLD AUTO: 1.16 K/UL — SIGNIFICANT CHANGE UP (ref 1–3.3)
LYMPHOCYTES # BLD AUTO: 1.18 K/UL — SIGNIFICANT CHANGE UP (ref 1–3.3)
LYMPHOCYTES # BLD AUTO: 1.26 K/UL — SIGNIFICANT CHANGE UP (ref 1.2–3.4)
LYMPHOCYTES # BLD AUTO: 1.47 K/UL — SIGNIFICANT CHANGE UP (ref 1.2–3.4)
LYMPHOCYTES # BLD AUTO: 2 % — LOW (ref 20.5–51.1)
LYMPHOCYTES # BLD AUTO: 2.1 % — LOW (ref 20.5–51.1)
LYMPHOCYTES # BLD AUTO: 3.1 % — LOW (ref 20.5–51.1)
LYMPHOCYTES # BLD AUTO: 4.7 % — LOW (ref 20.5–51.1)
LYMPHOCYTES # BLD AUTO: 7.5 % — LOW (ref 20.5–51.1)
LYMPHOCYTES # BLD AUTO: 9.3 % — LOW (ref 20.5–51.1)
LYMPHOCYTES NFR BLD AUTO: 2.8 % — LOW (ref 13–44)
LYMPHOCYTES NFR BLD AUTO: 3.6 % — LOW (ref 13–44)
LYMPHOCYTES NFR BLD AUTO: 3.7 % — LOW (ref 13–44)
LYMPHOCYTES NFR BLD AUTO: 3.9 % — LOW (ref 13–44)
LYMPHOCYTES NFR BLD AUTO: 4.4 % — LOW (ref 13–44)
LYMPHOCYTES NFR BLD AUTO: 4.6 % — LOW (ref 13–44)
LYMPHOCYTES NFR BLD AUTO: 5.2 % — LOW (ref 13–44)
LYMPHOCYTES NFR BLD AUTO: 5.3 % — LOW (ref 13–44)
LYMPHOCYTES NFR BLD AUTO: 6.4 % — LOW (ref 13–44)
LYMPHOCYTES NFR BLD AUTO: 6.5 % — LOW (ref 13–44)
LYMPHOCYTES NFR BLD AUTO: 6.5 % — LOW (ref 13–44)
LYMPHOCYTES NFR BLD AUTO: 6.6 % — LOW (ref 13–44)
MAGNESIUM SERPL-MCNC: 2.1 MG/DL — SIGNIFICANT CHANGE UP (ref 1.8–2.4)
MAGNESIUM SERPL-MCNC: 2.2 MG/DL — SIGNIFICANT CHANGE UP (ref 1.8–2.4)
MAGNESIUM SERPL-MCNC: 2.3 MG/DL — SIGNIFICANT CHANGE UP (ref 1.8–2.4)
MAGNESIUM SERPL-MCNC: 2.3 MG/DL — SIGNIFICANT CHANGE UP (ref 1.8–2.4)
MAGNESIUM SERPL-MCNC: 2.4 MG/DL — SIGNIFICANT CHANGE UP (ref 1.8–2.4)
MAGNESIUM SERPL-MCNC: 2.5 MG/DL — HIGH (ref 1.8–2.4)
MAGNESIUM SERPL-MCNC: 2.6 MG/DL — HIGH (ref 1.8–2.4)
MAGNESIUM SERPL-MCNC: 2.7 MG/DL — HIGH (ref 1.8–2.4)
MAGNESIUM SERPL-MCNC: 2.8 MG/DL — HIGH (ref 1.8–2.4)
MCHC RBC-ENTMCNC: 24.6 PG — LOW (ref 27–34)
MCHC RBC-ENTMCNC: 24.8 PG — LOW (ref 27–34)
MCHC RBC-ENTMCNC: 24.8 PG — LOW (ref 27–34)
MCHC RBC-ENTMCNC: 24.9 PG — LOW (ref 27–34)
MCHC RBC-ENTMCNC: 24.9 PG — LOW (ref 27–34)
MCHC RBC-ENTMCNC: 25 PG — LOW (ref 27–34)
MCHC RBC-ENTMCNC: 25.1 PG — LOW (ref 27–34)
MCHC RBC-ENTMCNC: 25.2 PG — LOW (ref 27–34)
MCHC RBC-ENTMCNC: 25.2 PG — LOW (ref 27–34)
MCHC RBC-ENTMCNC: 25.3 PG — LOW (ref 27–31)
MCHC RBC-ENTMCNC: 25.3 PG — LOW (ref 27–31)
MCHC RBC-ENTMCNC: 25.4 PG — LOW (ref 27–31)
MCHC RBC-ENTMCNC: 25.4 PG — LOW (ref 27–34)
MCHC RBC-ENTMCNC: 25.4 PG — LOW (ref 27–34)
MCHC RBC-ENTMCNC: 25.5 PG — LOW (ref 27–31)
MCHC RBC-ENTMCNC: 25.5 PG — LOW (ref 27–31)
MCHC RBC-ENTMCNC: 25.6 PG — LOW (ref 27–34)
MCHC RBC-ENTMCNC: 25.7 PG — LOW (ref 27–31)
MCHC RBC-ENTMCNC: 25.7 PG — LOW (ref 27–34)
MCHC RBC-ENTMCNC: 25.9 PG — LOW (ref 27–34)
MCHC RBC-ENTMCNC: 26.6 PG — LOW (ref 27–31)
MCHC RBC-ENTMCNC: 27.8 G/DL — LOW (ref 32–36)
MCHC RBC-ENTMCNC: 28.1 G/DL — LOW (ref 32–36)
MCHC RBC-ENTMCNC: 28.2 G/DL — LOW (ref 32–36)
MCHC RBC-ENTMCNC: 28.5 G/DL — LOW (ref 32–36)
MCHC RBC-ENTMCNC: 28.7 G/DL — LOW (ref 32–36)
MCHC RBC-ENTMCNC: 28.9 G/DL — LOW (ref 32–36)
MCHC RBC-ENTMCNC: 28.9 G/DL — LOW (ref 32–36)
MCHC RBC-ENTMCNC: 28.9 G/DL — LOW (ref 32–37)
MCHC RBC-ENTMCNC: 29 G/DL — LOW (ref 32–36)
MCHC RBC-ENTMCNC: 29 G/DL — LOW (ref 32–36)
MCHC RBC-ENTMCNC: 29.1 G/DL — LOW (ref 32–36)
MCHC RBC-ENTMCNC: 29.1 PG — SIGNIFICANT CHANGE UP (ref 27–31)
MCHC RBC-ENTMCNC: 29.2 G/DL — LOW (ref 32–36)
MCHC RBC-ENTMCNC: 29.4 G/DL — LOW (ref 32–37)
MCHC RBC-ENTMCNC: 29.6 G/DL — LOW (ref 32–36)
MCHC RBC-ENTMCNC: 29.7 G/DL — LOW (ref 32–36)
MCHC RBC-ENTMCNC: 29.7 G/DL — LOW (ref 32–36)
MCHC RBC-ENTMCNC: 29.7 G/DL — LOW (ref 32–37)
MCHC RBC-ENTMCNC: 29.8 G/DL — LOW (ref 32–36)
MCHC RBC-ENTMCNC: 30.1 G/DL — LOW (ref 32–36)
MCHC RBC-ENTMCNC: 30.1 G/DL — LOW (ref 32–37)
MCHC RBC-ENTMCNC: 30.1 G/DL — LOW (ref 32–37)
MCHC RBC-ENTMCNC: 30.4 G/DL — LOW (ref 32–36)
MCHC RBC-ENTMCNC: 30.4 G/DL — LOW (ref 32–37)
MCHC RBC-ENTMCNC: 31.8 G/DL — LOW (ref 32–37)
MCHC RBC-ENTMCNC: 32.1 G/DL — SIGNIFICANT CHANGE UP (ref 32–37)
MCV RBC AUTO: 83.5 FL — SIGNIFICANT CHANGE UP (ref 80–100)
MCV RBC AUTO: 83.6 FL — SIGNIFICANT CHANGE UP (ref 80–100)
MCV RBC AUTO: 83.8 FL — SIGNIFICANT CHANGE UP (ref 80–100)
MCV RBC AUTO: 83.8 FL — SIGNIFICANT CHANGE UP (ref 80–94)
MCV RBC AUTO: 84 FL — SIGNIFICANT CHANGE UP (ref 80–100)
MCV RBC AUTO: 84 FL — SIGNIFICANT CHANGE UP (ref 80–94)
MCV RBC AUTO: 84.1 FL — SIGNIFICANT CHANGE UP (ref 80–100)
MCV RBC AUTO: 84.2 FL — SIGNIFICANT CHANGE UP (ref 80–94)
MCV RBC AUTO: 84.7 FL — SIGNIFICANT CHANGE UP (ref 80–100)
MCV RBC AUTO: 84.8 FL — SIGNIFICANT CHANGE UP (ref 80–94)
MCV RBC AUTO: 85.5 FL — SIGNIFICANT CHANGE UP (ref 80–100)
MCV RBC AUTO: 85.7 FL — SIGNIFICANT CHANGE UP (ref 80–100)
MCV RBC AUTO: 85.9 FL — SIGNIFICANT CHANGE UP (ref 80–100)
MCV RBC AUTO: 85.9 FL — SIGNIFICANT CHANGE UP (ref 80–100)
MCV RBC AUTO: 86.1 FL — SIGNIFICANT CHANGE UP (ref 80–100)
MCV RBC AUTO: 86.1 FL — SIGNIFICANT CHANGE UP (ref 80–94)
MCV RBC AUTO: 86.2 FL — SIGNIFICANT CHANGE UP (ref 80–100)
MCV RBC AUTO: 86.7 FL — SIGNIFICANT CHANGE UP (ref 80–94)
MCV RBC AUTO: 87 FL — SIGNIFICANT CHANGE UP (ref 80–100)
MCV RBC AUTO: 87 FL — SIGNIFICANT CHANGE UP (ref 80–100)
MCV RBC AUTO: 87.2 FL — SIGNIFICANT CHANGE UP (ref 80–100)
MCV RBC AUTO: 87.3 FL — SIGNIFICANT CHANGE UP (ref 80–94)
MCV RBC AUTO: 87.8 FL — SIGNIFICANT CHANGE UP (ref 80–100)
MCV RBC AUTO: 88.4 FL — SIGNIFICANT CHANGE UP (ref 80–100)
MCV RBC AUTO: 88.4 FL — SIGNIFICANT CHANGE UP (ref 80–100)
MCV RBC AUTO: 88.7 FL — SIGNIFICANT CHANGE UP (ref 80–100)
MCV RBC AUTO: 90.7 FL — SIGNIFICANT CHANGE UP (ref 80–94)
MONOCYTES # BLD AUTO: 0.45 K/UL — SIGNIFICANT CHANGE UP (ref 0.1–0.6)
MONOCYTES # BLD AUTO: 0.83 K/UL — HIGH (ref 0.1–0.6)
MONOCYTES # BLD AUTO: 0.88 K/UL — SIGNIFICANT CHANGE UP (ref 0–0.9)
MONOCYTES # BLD AUTO: 0.95 K/UL — HIGH (ref 0–0.9)
MONOCYTES # BLD AUTO: 0.99 K/UL — HIGH (ref 0.1–0.6)
MONOCYTES # BLD AUTO: 1.02 K/UL — HIGH (ref 0–0.9)
MONOCYTES # BLD AUTO: 1.05 K/UL — HIGH (ref 0–0.9)
MONOCYTES # BLD AUTO: 1.07 K/UL — HIGH (ref 0–0.9)
MONOCYTES # BLD AUTO: 1.1 K/UL — HIGH (ref 0–0.9)
MONOCYTES # BLD AUTO: 1.24 K/UL — HIGH (ref 0–0.9)
MONOCYTES # BLD AUTO: 1.26 K/UL — HIGH (ref 0–0.9)
MONOCYTES # BLD AUTO: 1.29 K/UL — HIGH (ref 0.1–0.6)
MONOCYTES # BLD AUTO: 1.31 K/UL — HIGH (ref 0–0.9)
MONOCYTES # BLD AUTO: 1.35 K/UL — HIGH (ref 0–0.9)
MONOCYTES # BLD AUTO: 1.48 K/UL — HIGH (ref 0.1–0.6)
MONOCYTES # BLD AUTO: 1.62 K/UL — HIGH (ref 0–0.9)
MONOCYTES # BLD AUTO: 1.66 K/UL — HIGH (ref 0.1–0.6)
MONOCYTES # BLD AUTO: 1.74 K/UL — HIGH (ref 0–0.9)
MONOCYTES # BLD AUTO: 1.95 K/UL — HIGH (ref 0.1–0.6)
MONOCYTES NFR BLD AUTO: 1.1 % — LOW (ref 1.7–9.3)
MONOCYTES NFR BLD AUTO: 10.4 % — HIGH (ref 1.7–9.3)
MONOCYTES NFR BLD AUTO: 2 % — SIGNIFICANT CHANGE UP (ref 1.7–9.3)
MONOCYTES NFR BLD AUTO: 4.1 % — SIGNIFICANT CHANGE UP (ref 1.7–9.3)
MONOCYTES NFR BLD AUTO: 4.5 % — SIGNIFICANT CHANGE UP (ref 1.7–9.3)
MONOCYTES NFR BLD AUTO: 4.6 % — SIGNIFICANT CHANGE UP (ref 2–14)
MONOCYTES NFR BLD AUTO: 5.2 % — SIGNIFICANT CHANGE UP (ref 2–14)
MONOCYTES NFR BLD AUTO: 5.2 % — SIGNIFICANT CHANGE UP (ref 2–14)
MONOCYTES NFR BLD AUTO: 5.4 % — SIGNIFICANT CHANGE UP (ref 2–14)
MONOCYTES NFR BLD AUTO: 5.7 % — SIGNIFICANT CHANGE UP (ref 2–14)
MONOCYTES NFR BLD AUTO: 5.9 % — SIGNIFICANT CHANGE UP (ref 1.7–9.3)
MONOCYTES NFR BLD AUTO: 6 % — SIGNIFICANT CHANGE UP (ref 2–14)
MONOCYTES NFR BLD AUTO: 6.4 % — SIGNIFICANT CHANGE UP (ref 2–14)
MONOCYTES NFR BLD AUTO: 6.8 % — SIGNIFICANT CHANGE UP (ref 2–14)
MONOCYTES NFR BLD AUTO: 7 % — SIGNIFICANT CHANGE UP (ref 2–14)
MONOCYTES NFR BLD AUTO: 7.4 % — SIGNIFICANT CHANGE UP (ref 1.7–9.3)
MONOCYTES NFR BLD AUTO: 7.7 % — SIGNIFICANT CHANGE UP (ref 2–14)
MONOCYTES NFR BLD AUTO: 8.5 % — SIGNIFICANT CHANGE UP (ref 2–14)
MONOCYTES NFR BLD AUTO: 9 % — SIGNIFICANT CHANGE UP (ref 2–14)
MRSA PCR RESULT.: POSITIVE
NEUTROPHILS # BLD AUTO: 11.97 K/UL — HIGH (ref 1.8–7.4)
NEUTROPHILS # BLD AUTO: 13.17 K/UL — HIGH (ref 1.8–7.4)
NEUTROPHILS # BLD AUTO: 14.09 K/UL — HIGH (ref 1.4–6.5)
NEUTROPHILS # BLD AUTO: 15.22 K/UL — HIGH (ref 1.8–7.4)
NEUTROPHILS # BLD AUTO: 15.69 K/UL — HIGH (ref 1.8–7.4)
NEUTROPHILS # BLD AUTO: 16.2 K/UL — HIGH (ref 1.8–7.4)
NEUTROPHILS # BLD AUTO: 16.75 K/UL — HIGH (ref 1.8–7.4)
NEUTROPHILS # BLD AUTO: 17.07 K/UL — HIGH (ref 1.8–7.4)
NEUTROPHILS # BLD AUTO: 17.22 K/UL — HIGH (ref 1.8–7.4)
NEUTROPHILS # BLD AUTO: 19.31 K/UL — HIGH (ref 1.4–6.5)
NEUTROPHILS # BLD AUTO: 19.7 K/UL — HIGH (ref 1.8–7.4)
NEUTROPHILS # BLD AUTO: 21.4 K/UL — HIGH (ref 1.8–7.4)
NEUTROPHILS # BLD AUTO: 25.53 K/UL — HIGH (ref 1.8–7.4)
NEUTROPHILS # BLD AUTO: 30.39 K/UL — HIGH (ref 1.4–6.5)
NEUTROPHILS # BLD AUTO: 38.53 K/UL — HIGH (ref 1.4–6.5)
NEUTROPHILS # BLD AUTO: 41.33 K/UL — HIGH (ref 1.4–6.5)
NEUTROPHILS # BLD AUTO: 42.77 K/UL — HIGH (ref 1.4–6.5)
NEUTROPHILS # BLD AUTO: 9.8 K/UL — HIGH (ref 1.4–6.5)
NEUTROPHILS # BLD AUTO: 9.84 K/UL — HIGH (ref 1.8–7.4)
NEUTROPHILS NFR BLD AUTO: 78.7 % — HIGH (ref 42.2–75.2)
NEUTROPHILS NFR BLD AUTO: 82.5 % — HIGH (ref 43–77)
NEUTROPHILS NFR BLD AUTO: 83.2 % — HIGH (ref 43–77)
NEUTROPHILS NFR BLD AUTO: 83.4 % — HIGH (ref 42.2–75.2)
NEUTROPHILS NFR BLD AUTO: 84 % — HIGH (ref 43–77)
NEUTROPHILS NFR BLD AUTO: 84.2 % — HIGH (ref 43–77)
NEUTROPHILS NFR BLD AUTO: 84.6 % — HIGH (ref 43–77)
NEUTROPHILS NFR BLD AUTO: 84.7 % — HIGH (ref 43–77)
NEUTROPHILS NFR BLD AUTO: 86.1 % — HIGH (ref 43–77)
NEUTROPHILS NFR BLD AUTO: 86.4 % — HIGH (ref 42.2–75.2)
NEUTROPHILS NFR BLD AUTO: 86.6 % — HIGH (ref 43–77)
NEUTROPHILS NFR BLD AUTO: 87.7 % — HIGH (ref 43–77)
NEUTROPHILS NFR BLD AUTO: 88.7 % — HIGH (ref 43–77)
NEUTROPHILS NFR BLD AUTO: 89 % — HIGH (ref 43–77)
NEUTROPHILS NFR BLD AUTO: 89.4 % — HIGH (ref 43–77)
NEUTROPHILS NFR BLD AUTO: 91.1 % — HIGH (ref 42.2–75.2)
NEUTROPHILS NFR BLD AUTO: 92 % — HIGH (ref 42.2–75.2)
NEUTROPHILS NFR BLD AUTO: 94.3 % — HIGH (ref 42.2–75.2)
NEUTROPHILS NFR BLD AUTO: 96.6 % — HIGH (ref 42.2–75.2)
NIGHT BLUE STAIN TISS: SIGNIFICANT CHANGE UP
NONHDLC SERPL-MCNC: 161 MG/DL — HIGH
NRBC # BLD AUTO: 0 K/UL — SIGNIFICANT CHANGE UP (ref 0–0)
NRBC # BLD AUTO: 0.02 K/UL — HIGH (ref 0–0)
NRBC # BLD AUTO: 0.04 K/UL — HIGH (ref 0–0)
NRBC # BLD: 0 /100 WBCS — SIGNIFICANT CHANGE UP (ref 0–0)
NRBC # FLD: 0 K/UL — SIGNIFICANT CHANGE UP (ref 0–0)
NRBC # FLD: 0.02 K/UL — HIGH (ref 0–0)
NRBC # FLD: 0.04 K/UL — HIGH (ref 0–0)
NRBC BLD AUTO-RTO: 0 /100 WBCS — SIGNIFICANT CHANGE UP (ref 0–0)
NT-PROBNP SERPL-SCNC: 2894 PG/ML — HIGH (ref 0–300)
OB PNL STL: POSITIVE
PCO2 BLDA: 34 MMHG — LOW (ref 35–48)
PCO2 BLDA: 42 MMHG — SIGNIFICANT CHANGE UP (ref 35–48)
PCO2 BLDA: 42 MMHG — SIGNIFICANT CHANGE UP (ref 35–48)
PCO2 BLDA: 43 MMHG — SIGNIFICANT CHANGE UP (ref 35–48)
PCO2 BLDA: 47 MMHG — SIGNIFICANT CHANGE UP (ref 35–48)
PCO2 BLDA: 48 MMHG — SIGNIFICANT CHANGE UP (ref 35–48)
PCO2 BLDA: 50 MMHG — HIGH (ref 35–48)
PCO2 BLDA: 51 MMHG — HIGH (ref 35–48)
PCO2 BLDA: 53 MMHG — HIGH (ref 35–48)
PCO2 BLDA: 58 MMHG — HIGH (ref 35–48)
PCO2 BLDA: 58 MMHG — HIGH (ref 35–48)
PCO2 BLDV: 52 MMHG — SIGNIFICANT CHANGE UP (ref 42–55)
PH BLD: 7.37 — SIGNIFICANT CHANGE UP (ref 7.35–7.45)
PH BLDA: 7.29 — LOW (ref 7.35–7.45)
PH BLDA: 7.32 — LOW (ref 7.35–7.45)
PH BLDA: 7.34 — LOW (ref 7.35–7.45)
PH BLDA: 7.35 — SIGNIFICANT CHANGE UP (ref 7.35–7.45)
PH BLDA: 7.37 — SIGNIFICANT CHANGE UP (ref 7.35–7.45)
PH BLDA: 7.39 — SIGNIFICANT CHANGE UP (ref 7.35–7.45)
PH BLDA: 7.39 — SIGNIFICANT CHANGE UP (ref 7.35–7.45)
PH BLDA: 7.41 — SIGNIFICANT CHANGE UP (ref 7.35–7.45)
PH BLDA: 7.42 — SIGNIFICANT CHANGE UP (ref 7.35–7.45)
PH BLDA: 7.44 — SIGNIFICANT CHANGE UP (ref 7.35–7.45)
PH BLDV: 7.35 — SIGNIFICANT CHANGE UP (ref 7.32–7.43)
PHOSPHATE SERPL-MCNC: 2.7 MG/DL — SIGNIFICANT CHANGE UP (ref 2.1–4.9)
PHOSPHATE SERPL-MCNC: 2.9 MG/DL — SIGNIFICANT CHANGE UP (ref 2.1–4.9)
PHOSPHATE SERPL-MCNC: 3.5 MG/DL — SIGNIFICANT CHANGE UP (ref 2.1–4.9)
PHOSPHATE SERPL-MCNC: 3.6 MG/DL — SIGNIFICANT CHANGE UP (ref 2.1–4.9)
PHOSPHATE SERPL-MCNC: 4.6 MG/DL — SIGNIFICANT CHANGE UP (ref 2.1–4.9)
PLATELET # BLD AUTO: 194 K/UL — SIGNIFICANT CHANGE UP (ref 150–400)
PLATELET # BLD AUTO: 202 K/UL — SIGNIFICANT CHANGE UP (ref 150–400)
PLATELET # BLD AUTO: 204 K/UL — SIGNIFICANT CHANGE UP (ref 150–400)
PLATELET # BLD AUTO: 212 K/UL — SIGNIFICANT CHANGE UP (ref 150–400)
PLATELET # BLD AUTO: 216 K/UL — SIGNIFICANT CHANGE UP (ref 150–400)
PLATELET # BLD AUTO: 225 K/UL — SIGNIFICANT CHANGE UP (ref 150–400)
PLATELET # BLD AUTO: 233 K/UL — SIGNIFICANT CHANGE UP (ref 130–400)
PLATELET # BLD AUTO: 235 K/UL — SIGNIFICANT CHANGE UP (ref 150–400)
PLATELET # BLD AUTO: 243 K/UL — SIGNIFICANT CHANGE UP (ref 150–400)
PLATELET # BLD AUTO: 263 K/UL — SIGNIFICANT CHANGE UP (ref 150–400)
PLATELET # BLD AUTO: 275 K/UL — SIGNIFICANT CHANGE UP (ref 150–400)
PLATELET # BLD AUTO: 281 K/UL — SIGNIFICANT CHANGE UP (ref 150–400)
PLATELET # BLD AUTO: 292 K/UL — SIGNIFICANT CHANGE UP (ref 150–400)
PLATELET # BLD AUTO: 325 K/UL — SIGNIFICANT CHANGE UP (ref 150–400)
PLATELET # BLD AUTO: 335 K/UL — SIGNIFICANT CHANGE UP (ref 130–400)
PLATELET # BLD AUTO: 342 K/UL — SIGNIFICANT CHANGE UP (ref 130–400)
PLATELET # BLD AUTO: 387 K/UL — SIGNIFICANT CHANGE UP (ref 130–400)
PLATELET # BLD AUTO: 396 K/UL — SIGNIFICANT CHANGE UP (ref 130–400)
PLATELET # BLD AUTO: 402 K/UL — HIGH (ref 130–400)
PLATELET # BLD AUTO: 404 K/UL — HIGH (ref 150–400)
PLATELET # BLD AUTO: 431 K/UL — HIGH (ref 130–400)
PLATELET # BLD AUTO: 452 K/UL — HIGH (ref 130–400)
PLATELET # BLD AUTO: 506 K/UL — HIGH (ref 150–400)
PLATELET # BLD AUTO: 537 K/UL — HIGH (ref 150–400)
PLATELET # BLD AUTO: 578 K/UL — HIGH (ref 150–400)
PMV BLD: 10.6 FL — HIGH (ref 7.4–10.4)
PMV BLD: 10.7 FL — HIGH (ref 7.4–10.4)
PMV BLD: 10.8 FL — HIGH (ref 7.4–10.4)
PMV BLD: 10.9 FL — HIGH (ref 7.4–10.4)
PMV BLD: 10.9 FL — HIGH (ref 7.4–10.4)
PMV BLD: 11 FL — HIGH (ref 7.4–10.4)
PMV BLD: 11.2 FL — SIGNIFICANT CHANGE UP (ref 7–13)
PMV BLD: 11.4 FL — HIGH (ref 7.4–10.4)
PMV BLD: 11.4 FL — SIGNIFICANT CHANGE UP (ref 7–13)
PMV BLD: 11.5 FL — SIGNIFICANT CHANGE UP (ref 7–13)
PMV BLD: 11.5 FL — SIGNIFICANT CHANGE UP (ref 7–13)
PMV BLD: 11.6 FL — HIGH (ref 7.4–10.4)
PMV BLD: 11.7 FL — SIGNIFICANT CHANGE UP (ref 7–13)
PMV BLD: 11.7 FL — SIGNIFICANT CHANGE UP (ref 7–13)
PMV BLD: 11.9 FL — SIGNIFICANT CHANGE UP (ref 7–13)
PMV BLD: 12 FL — SIGNIFICANT CHANGE UP (ref 7–13)
PMV BLD: 12.2 FL — SIGNIFICANT CHANGE UP (ref 7–13)
PMV BLD: 12.2 FL — SIGNIFICANT CHANGE UP (ref 7–13)
PMV BLD: 12.3 FL — SIGNIFICANT CHANGE UP (ref 7–13)
PMV BLD: 12.6 FL — SIGNIFICANT CHANGE UP (ref 7–13)
PMV BLD: 12.6 FL — SIGNIFICANT CHANGE UP (ref 7–13)
PMV BLD: 12.8 FL — SIGNIFICANT CHANGE UP (ref 7–13)
PMV BLD: 12.9 FL — SIGNIFICANT CHANGE UP (ref 7–13)
PMV BLD: 13.1 FL — HIGH (ref 7–13)
PMV BLD: 13.3 FL — HIGH (ref 7–13)
PMV BLD: 13.4 FL — HIGH (ref 7–13)
PMV BLD: 13.5 FL — HIGH (ref 7–13)
PO2 BLDA: 102 MMHG — SIGNIFICANT CHANGE UP (ref 83–108)
PO2 BLDA: 105 MMHG — SIGNIFICANT CHANGE UP (ref 83–108)
PO2 BLDA: 115 MMHG — HIGH (ref 83–108)
PO2 BLDA: 128 MMHG — HIGH (ref 83–108)
PO2 BLDA: 140 MMHG — HIGH (ref 83–108)
PO2 BLDA: 142 MMHG — HIGH (ref 83–108)
PO2 BLDA: 163 MMHG — HIGH (ref 83–108)
PO2 BLDA: 180 MMHG — HIGH (ref 83–108)
PO2 BLDA: 205 MMHG — HIGH (ref 83–108)
PO2 BLDA: 84 MMHG — SIGNIFICANT CHANGE UP (ref 83–108)
PO2 BLDA: 99 MMHG — SIGNIFICANT CHANGE UP (ref 83–108)
PO2 BLDV: 31 MMHG — SIGNIFICANT CHANGE UP (ref 25–45)
POTASSIUM BLDV-SCNC: 5 MMOL/L — SIGNIFICANT CHANGE UP (ref 3.5–5.1)
POTASSIUM SERPL-MCNC: 4.3 MMOL/L — SIGNIFICANT CHANGE UP (ref 3.5–5)
POTASSIUM SERPL-MCNC: 4.3 MMOL/L — SIGNIFICANT CHANGE UP (ref 3.5–5)
POTASSIUM SERPL-MCNC: 4.4 MMOL/L — SIGNIFICANT CHANGE UP (ref 3.5–5)
POTASSIUM SERPL-MCNC: 4.6 MMOL/L — SIGNIFICANT CHANGE UP (ref 3.5–5)
POTASSIUM SERPL-MCNC: 4.7 MMOL/L — SIGNIFICANT CHANGE UP (ref 3.5–5)
POTASSIUM SERPL-MCNC: 4.7 MMOL/L — SIGNIFICANT CHANGE UP (ref 3.5–5)
POTASSIUM SERPL-MCNC: 4.8 MMOL/L — SIGNIFICANT CHANGE UP (ref 3.5–5)
POTASSIUM SERPL-MCNC: 4.9 MMOL/L — SIGNIFICANT CHANGE UP (ref 3.5–5)
POTASSIUM SERPL-MCNC: 5 MMOL/L — SIGNIFICANT CHANGE UP (ref 3.5–5)
POTASSIUM SERPL-MCNC: 5.1 MMOL/L — HIGH (ref 3.5–5)
POTASSIUM SERPL-MCNC: 5.2 MMOL/L — HIGH (ref 3.5–5)
POTASSIUM SERPL-MCNC: 5.3 MMOL/L — HIGH (ref 3.5–5)
POTASSIUM SERPL-MCNC: 5.5 MMOL/L — HIGH (ref 3.5–5)
POTASSIUM SERPL-MCNC: 5.6 MMOL/L — HIGH (ref 3.5–5)
POTASSIUM SERPL-MCNC: 5.6 MMOL/L — HIGH (ref 3.5–5)
POTASSIUM SERPL-MCNC: 5.7 MMOL/L — HIGH (ref 3.5–5)
POTASSIUM SERPL-MCNC: SIGNIFICANT CHANGE UP MMOL/L (ref 3.5–5)
POTASSIUM SERPL-SCNC: 4.3 MMOL/L — SIGNIFICANT CHANGE UP (ref 3.5–5)
POTASSIUM SERPL-SCNC: 4.3 MMOL/L — SIGNIFICANT CHANGE UP (ref 3.5–5)
POTASSIUM SERPL-SCNC: 4.4 MMOL/L — SIGNIFICANT CHANGE UP (ref 3.5–5)
POTASSIUM SERPL-SCNC: 4.6 MMOL/L — SIGNIFICANT CHANGE UP (ref 3.5–5)
POTASSIUM SERPL-SCNC: 4.7 MMOL/L — SIGNIFICANT CHANGE UP (ref 3.5–5)
POTASSIUM SERPL-SCNC: 4.7 MMOL/L — SIGNIFICANT CHANGE UP (ref 3.5–5)
POTASSIUM SERPL-SCNC: 4.8 MMOL/L — SIGNIFICANT CHANGE UP (ref 3.5–5)
POTASSIUM SERPL-SCNC: 4.9 MMOL/L — SIGNIFICANT CHANGE UP (ref 3.5–5)
POTASSIUM SERPL-SCNC: 5 MMOL/L — SIGNIFICANT CHANGE UP (ref 3.5–5)
POTASSIUM SERPL-SCNC: 5.1 MMOL/L — HIGH (ref 3.5–5)
POTASSIUM SERPL-SCNC: 5.2 MMOL/L — HIGH (ref 3.5–5)
POTASSIUM SERPL-SCNC: 5.3 MMOL/L — HIGH (ref 3.5–5)
POTASSIUM SERPL-SCNC: 5.5 MMOL/L — HIGH (ref 3.5–5)
POTASSIUM SERPL-SCNC: 5.6 MMOL/L — HIGH (ref 3.5–5)
POTASSIUM SERPL-SCNC: 5.6 MMOL/L — HIGH (ref 3.5–5)
POTASSIUM SERPL-SCNC: 5.7 MMOL/L — HIGH (ref 3.5–5)
POTASSIUM SERPL-SCNC: SIGNIFICANT CHANGE UP MMOL/L (ref 3.5–5)
PROCALCITONIN SERPL-MCNC: 1.49 NG/ML — HIGH (ref 0.02–0.1)
PROT SERPL-MCNC: 4.9 G/DL — LOW (ref 6–8)
PROT SERPL-MCNC: 4.9 G/DL — LOW (ref 6–8)
PROT SERPL-MCNC: 5 G/DL — LOW (ref 6–8)
PROT SERPL-MCNC: 5.1 G/DL — LOW (ref 6–8)
PROT SERPL-MCNC: 5.1 G/DL — LOW (ref 6–8)
PROT SERPL-MCNC: 5.2 G/DL — LOW (ref 6–8)
PROT SERPL-MCNC: 5.3 G/DL — LOW (ref 6–8)
PROT SERPL-MCNC: 5.4 G/DL — LOW (ref 6–8)
PROT SERPL-MCNC: 5.6 G/DL — LOW (ref 6–8)
PROT SERPL-MCNC: 5.8 G/DL — LOW (ref 6–8)
PROT SERPL-MCNC: 6.3 G/DL — SIGNIFICANT CHANGE UP (ref 6–8)
PROT SERPL-MCNC: 6.5 G/DL — SIGNIFICANT CHANGE UP (ref 6–8)
PROT SERPL-MCNC: 6.5 G/DL — SIGNIFICANT CHANGE UP (ref 6–8)
PROT SERPL-MCNC: SIGNIFICANT CHANGE UP G/DL (ref 6–8)
PROTHROM AB SERPL-ACNC: 19 SEC — HIGH (ref 9.95–12.87)
PROTHROM AB SERPL-ACNC: 21.3 SEC — HIGH (ref 9.95–12.87)
PROTHROM AB SERPL-ACNC: 29.6 SEC — HIGH (ref 9.95–12.87)
RBC # BLD: 3.19 M/UL — LOW (ref 4.2–5.8)
RBC # BLD: 3.28 M/UL — LOW (ref 4.2–5.8)
RBC # BLD: 3.3 M/UL — LOW (ref 4.2–5.8)
RBC # BLD: 3.31 M/UL — LOW (ref 4.2–5.8)
RBC # BLD: 3.4 M/UL — LOW (ref 4.2–5.8)
RBC # BLD: 3.4 M/UL — LOW (ref 4.2–5.8)
RBC # BLD: 3.52 M/UL — LOW (ref 4.2–5.8)
RBC # BLD: 3.62 M/UL — LOW (ref 4.2–5.8)
RBC # BLD: 3.71 M/UL — LOW (ref 4.2–5.8)
RBC # BLD: 3.73 M/UL — LOW (ref 4.2–5.8)
RBC # BLD: 3.77 M/UL — LOW (ref 4.2–5.8)
RBC # BLD: 3.91 M/UL — LOW (ref 4.2–5.8)
RBC # BLD: 3.94 M/UL — LOW (ref 4.2–5.8)
RBC # BLD: 3.94 M/UL — LOW (ref 4.2–5.8)
RBC # BLD: 3.95 M/UL — LOW (ref 4.2–5.8)
RBC # BLD: 3.98 M/UL — LOW (ref 4.2–5.8)
RBC # BLD: 4.38 M/UL — SIGNIFICANT CHANGE UP (ref 4.2–5.8)
RBC # BLD: 4.43 M/UL — SIGNIFICANT CHANGE UP (ref 4.2–5.8)
RBC # BLD: 4.54 M/UL — LOW (ref 4.7–6.1)
RBC # BLD: 4.64 M/UL — SIGNIFICANT CHANGE UP (ref 4.2–5.8)
RBC # BLD: 4.7 M/UL — SIGNIFICANT CHANGE UP (ref 4.7–6.1)
RBC # BLD: 4.81 M/UL — SIGNIFICANT CHANGE UP (ref 4.7–6.1)
RBC # BLD: 4.95 M/UL — SIGNIFICANT CHANGE UP (ref 4.7–6.1)
RBC # BLD: 5.17 M/UL — SIGNIFICANT CHANGE UP (ref 4.7–6.1)
RBC # BLD: 5.2 M/UL — SIGNIFICANT CHANGE UP (ref 4.7–6.1)
RBC # BLD: 5.25 M/UL — SIGNIFICANT CHANGE UP (ref 4.7–6.1)
RBC # BLD: 5.25 M/UL — SIGNIFICANT CHANGE UP (ref 4.7–6.1)
RBC # FLD: 13.1 % — SIGNIFICANT CHANGE UP (ref 11.5–14.5)
RBC # FLD: 15 % — HIGH (ref 10.3–14.5)
RBC # FLD: 15.2 % — HIGH (ref 10.3–14.5)
RBC # FLD: 15.2 % — HIGH (ref 11.5–14.5)
RBC # FLD: 15.2 % — HIGH (ref 11.5–14.5)
RBC # FLD: 15.3 % — HIGH (ref 10.3–14.5)
RBC # FLD: 15.3 % — HIGH (ref 11.5–14.5)
RBC # FLD: 15.3 % — HIGH (ref 11.5–14.5)
RBC # FLD: 15.4 % — HIGH (ref 10.3–14.5)
RBC # FLD: 15.4 % — HIGH (ref 11.5–14.5)
RBC # FLD: 15.5 % — HIGH (ref 10.3–14.5)
RBC # FLD: 15.5 % — HIGH (ref 11.5–14.5)
RBC # FLD: 15.6 % — HIGH (ref 10.3–14.5)
RBC # FLD: 15.6 % — HIGH (ref 10.3–14.5)
RBC # FLD: 15.7 % — HIGH (ref 10.3–14.5)
RBC # FLD: 15.7 % — HIGH (ref 10.3–14.5)
RBC # FLD: 15.9 % — HIGH (ref 10.3–14.5)
RBC # FLD: 15.9 % — HIGH (ref 10.3–14.5)
RBC # FLD: 15.9 % — HIGH (ref 11.5–14.5)
RBC # FLD: 16.3 % — HIGH (ref 10.3–14.5)
SAO2 % BLDA: 100 % — HIGH (ref 94–98)
SAO2 % BLDA: 99 % — HIGH (ref 94–98)
SAO2 % BLDA: 99 % — HIGH (ref 94–98)
SAO2 % BLDA: 99.1 % — HIGH (ref 94–98)
SAO2 % BLDA: 99.3 % — HIGH (ref 94–98)
SAO2 % BLDA: 99.4 % — HIGH (ref 94–98)
SAO2 % BLDA: 99.5 % — HIGH (ref 94–98)
SAO2 % BLDA: 99.6 % — HIGH (ref 94–98)
SAO2 % BLDA: 99.7 % — HIGH (ref 94–98)
SAO2 % BLDV: 51.1 % — LOW (ref 67–88)
SODIUM SERPL-SCNC: 135 MMOL/L — SIGNIFICANT CHANGE UP (ref 135–146)
SODIUM SERPL-SCNC: 136 MMOL/L — SIGNIFICANT CHANGE UP (ref 135–146)
SODIUM SERPL-SCNC: 137 MMOL/L — SIGNIFICANT CHANGE UP (ref 135–146)
SODIUM SERPL-SCNC: 138 MMOL/L — SIGNIFICANT CHANGE UP (ref 135–146)
SODIUM SERPL-SCNC: 139 MMOL/L — SIGNIFICANT CHANGE UP (ref 135–146)
SODIUM SERPL-SCNC: 139 MMOL/L — SIGNIFICANT CHANGE UP (ref 135–146)
SODIUM SERPL-SCNC: 140 MMOL/L — SIGNIFICANT CHANGE UP (ref 135–146)
SODIUM SERPL-SCNC: 140 MMOL/L — SIGNIFICANT CHANGE UP (ref 135–146)
SODIUM SERPL-SCNC: 141 MMOL/L — SIGNIFICANT CHANGE UP (ref 135–146)
SODIUM SERPL-SCNC: 141 MMOL/L — SIGNIFICANT CHANGE UP (ref 135–146)
SODIUM SERPL-SCNC: 142 MMOL/L — SIGNIFICANT CHANGE UP (ref 135–146)
SODIUM SERPL-SCNC: 143 MMOL/L — SIGNIFICANT CHANGE UP (ref 135–146)
SODIUM SERPL-SCNC: 145 MMOL/L — SIGNIFICANT CHANGE UP (ref 135–146)
SODIUM SERPL-SCNC: 145 MMOL/L — SIGNIFICANT CHANGE UP (ref 135–146)
SODIUM SERPL-SCNC: 146 MMOL/L — SIGNIFICANT CHANGE UP (ref 135–146)
SODIUM SERPL-SCNC: 146 MMOL/L — SIGNIFICANT CHANGE UP (ref 135–146)
SODIUM SERPL-SCNC: 147 MMOL/L — HIGH (ref 135–146)
SODIUM SERPL-SCNC: 148 MMOL/L — HIGH (ref 135–146)
SODIUM SERPL-SCNC: 149 MMOL/L — HIGH (ref 135–146)
SODIUM SERPL-SCNC: 150 MMOL/L — HIGH (ref 135–146)
SODIUM SERPL-SCNC: 151 MMOL/L — HIGH (ref 135–146)
SODIUM SERPL-SCNC: 152 MMOL/L — HIGH (ref 135–146)
SODIUM SERPL-SCNC: SIGNIFICANT CHANGE UP MMOL/L (ref 135–146)
SPECIMEN SOURCE: SIGNIFICANT CHANGE UP
TIBC SERPL-MCNC: 208 UG/DL — LOW (ref 220–430)
TRIGL SERPL-MCNC: 112 MG/DL — SIGNIFICANT CHANGE UP
TRIGL SERPL-MCNC: 118 MG/DL — SIGNIFICANT CHANGE UP
TRIGL SERPL-MCNC: 79 MG/DL — SIGNIFICANT CHANGE UP
TROPONIN T, HIGH SENSITIVITY RESULT: 113 NG/L — CRITICAL HIGH (ref 6–21)
TROPONIN T, HIGH SENSITIVITY RESULT: 121 NG/L — CRITICAL HIGH (ref 6–21)
TROPONIN T, HIGH SENSITIVITY RESULT: 121 NG/L — CRITICAL HIGH (ref 6–21)
TROPONIN T, HIGH SENSITIVITY RESULT: 134 NG/L — CRITICAL HIGH (ref 6–21)
TROPONIN T, HIGH SENSITIVITY RESULT: 137 NG/L — CRITICAL HIGH (ref 6–21)
TROPONIN T, HIGH SENSITIVITY RESULT: 141 NG/L — CRITICAL HIGH (ref 6–21)
UIBC SERPL-MCNC: 185 UG/DL — SIGNIFICANT CHANGE UP (ref 110–370)
VANCOMYCIN FLD-MCNC: 11.4 UG/ML — HIGH (ref 5–10)
VANCOMYCIN FLD-MCNC: 14.4 UG/ML — HIGH (ref 5–10)
VANCOMYCIN FLD-MCNC: 14.5 UG/ML — HIGH (ref 5–10)
VANCOMYCIN TROUGH SERPL-MCNC: 14.5 UG/ML — HIGH (ref 5–10)
VANCOMYCIN TROUGH SERPL-MCNC: 7 UG/ML — SIGNIFICANT CHANGE UP (ref 5–10)
VANCOMYCIN TROUGH SERPL-MCNC: 8.1 UG/ML — SIGNIFICANT CHANGE UP (ref 5–10)
VIT B12 SERPL-MCNC: 585 PG/ML — SIGNIFICANT CHANGE UP (ref 232–1245)
WBC # BLD: 11.92 K/UL — HIGH (ref 3.8–10.5)
WBC # BLD: 12.36 K/UL — HIGH (ref 3.8–10.5)
WBC # BLD: 12.45 K/UL — HIGH (ref 4.8–10.8)
WBC # BLD: 14.25 K/UL — HIGH (ref 3.8–10.5)
WBC # BLD: 14.84 K/UL — HIGH (ref 3.8–10.5)
WBC # BLD: 15.45 K/UL — HIGH (ref 3.8–10.5)
WBC # BLD: 15.55 K/UL — HIGH (ref 3.8–10.5)
WBC # BLD: 16.87 K/UL — HIGH (ref 4.8–10.8)
WBC # BLD: 17.19 K/UL — HIGH (ref 3.8–10.5)
WBC # BLD: 17.97 K/UL — HIGH (ref 3.8–10.5)
WBC # BLD: 18.14 K/UL — HIGH (ref 3.8–10.5)
WBC # BLD: 18.93 K/UL — HIGH (ref 3.8–10.5)
WBC # BLD: 19.24 K/UL — HIGH (ref 3.8–10.5)
WBC # BLD: 19.25 K/UL — HIGH (ref 3.8–10.5)
WBC # BLD: 19.44 K/UL — HIGH (ref 3.8–10.5)
WBC # BLD: 20.51 K/UL — HIGH (ref 3.8–10.5)
WBC # BLD: 21.81 K/UL — HIGH (ref 3.8–10.5)
WBC # BLD: 22.35 K/UL — HIGH (ref 4.8–10.8)
WBC # BLD: 22.48 K/UL — HIGH (ref 3.8–10.5)
WBC # BLD: 23.5 K/UL — HIGH (ref 3.8–10.5)
WBC # BLD: 24.15 K/UL — HIGH (ref 3.8–10.5)
WBC # BLD: 28.66 K/UL — HIGH (ref 3.8–10.5)
WBC # BLD: 33.02 K/UL — HIGH (ref 4.8–10.8)
WBC # BLD: 40.88 K/UL — CRITICAL HIGH (ref 4.8–10.8)
WBC # BLD: 42.76 K/UL — CRITICAL HIGH (ref 4.8–10.8)
WBC # BLD: 46.99 K/UL — CRITICAL HIGH (ref 4.8–10.8)
WBC # BLD: 53.32 K/UL — CRITICAL HIGH (ref 4.8–10.8)
WBC # FLD AUTO: 11.92 K/UL — HIGH (ref 3.8–10.5)
WBC # FLD AUTO: 12.36 K/UL — HIGH (ref 3.8–10.5)
WBC # FLD AUTO: 12.45 K/UL — HIGH (ref 4.8–10.8)
WBC # FLD AUTO: 14.25 K/UL — HIGH (ref 3.8–10.5)
WBC # FLD AUTO: 14.84 K/UL — HIGH (ref 3.8–10.5)
WBC # FLD AUTO: 15.45 K/UL — HIGH (ref 3.8–10.5)
WBC # FLD AUTO: 15.55 K/UL — HIGH (ref 3.8–10.5)
WBC # FLD AUTO: 16.87 K/UL — HIGH (ref 4.8–10.8)
WBC # FLD AUTO: 17.19 K/UL — HIGH (ref 3.8–10.5)
WBC # FLD AUTO: 17.97 K/UL — HIGH (ref 3.8–10.5)
WBC # FLD AUTO: 18.14 K/UL — HIGH (ref 3.8–10.5)
WBC # FLD AUTO: 18.93 K/UL — HIGH (ref 3.8–10.5)
WBC # FLD AUTO: 19.24 K/UL — HIGH (ref 3.8–10.5)
WBC # FLD AUTO: 19.25 K/UL — HIGH (ref 3.8–10.5)
WBC # FLD AUTO: 19.44 K/UL — HIGH (ref 3.8–10.5)
WBC # FLD AUTO: 20.51 K/UL — HIGH (ref 3.8–10.5)
WBC # FLD AUTO: 21.81 K/UL — HIGH (ref 3.8–10.5)
WBC # FLD AUTO: 22.35 K/UL — HIGH (ref 4.8–10.8)
WBC # FLD AUTO: 22.48 K/UL — HIGH (ref 3.8–10.5)
WBC # FLD AUTO: 23.5 K/UL — HIGH (ref 3.8–10.5)
WBC # FLD AUTO: 24.15 K/UL — HIGH (ref 3.8–10.5)
WBC # FLD AUTO: 28.66 K/UL — HIGH (ref 3.8–10.5)
WBC # FLD AUTO: 33.02 K/UL — HIGH (ref 4.8–10.8)
WBC # FLD AUTO: 40.88 K/UL — CRITICAL HIGH (ref 4.8–10.8)
WBC # FLD AUTO: 42.76 K/UL — CRITICAL HIGH (ref 4.8–10.8)
WBC # FLD AUTO: 46.99 K/UL — CRITICAL HIGH (ref 4.8–10.8)
WBC # FLD AUTO: 53.32 K/UL — CRITICAL HIGH (ref 4.8–10.8)

## 2025-01-01 PROCEDURE — 82270 OCCULT BLOOD FECES: CPT

## 2025-01-01 PROCEDURE — 93306 TTE W/DOPPLER COMPLETE: CPT | Mod: 26

## 2025-01-01 PROCEDURE — 99223 1ST HOSP IP/OBS HIGH 75: CPT

## 2025-01-01 PROCEDURE — 84484 ASSAY OF TROPONIN QUANT: CPT

## 2025-01-01 PROCEDURE — 99291 CRITICAL CARE FIRST HOUR: CPT

## 2025-01-01 PROCEDURE — 99233 SBSQ HOSP IP/OBS HIGH 50: CPT

## 2025-01-01 PROCEDURE — 83550 IRON BINDING TEST: CPT

## 2025-01-01 PROCEDURE — 71045 X-RAY EXAM CHEST 1 VIEW: CPT | Mod: 26

## 2025-01-01 PROCEDURE — 86900 BLOOD TYPING SEROLOGIC ABO: CPT

## 2025-01-01 PROCEDURE — 87641 MR-STAPH DNA AMP PROBE: CPT

## 2025-01-01 PROCEDURE — 86901 BLOOD TYPING SEROLOGIC RH(D): CPT

## 2025-01-01 PROCEDURE — 99222 1ST HOSP IP/OBS MODERATE 55: CPT

## 2025-01-01 PROCEDURE — 85014 HEMATOCRIT: CPT

## 2025-01-01 PROCEDURE — 87324 CLOSTRIDIUM AG IA: CPT

## 2025-01-01 PROCEDURE — 93970 EXTREMITY STUDY: CPT | Mod: 26

## 2025-01-01 PROCEDURE — 86850 RBC ANTIBODY SCREEN: CPT

## 2025-01-01 PROCEDURE — 85018 HEMOGLOBIN: CPT

## 2025-01-01 PROCEDURE — 87449 NOS EACH ORGANISM AG IA: CPT

## 2025-01-01 PROCEDURE — 99233 SBSQ HOSP IP/OBS HIGH 50: CPT | Mod: 2W

## 2025-01-01 PROCEDURE — 71045 X-RAY EXAM CHEST 1 VIEW: CPT

## 2025-01-01 PROCEDURE — 87640 STAPH A DNA AMP PROBE: CPT

## 2025-01-01 PROCEDURE — 36415 COLL VENOUS BLD VENIPUNCTURE: CPT

## 2025-01-01 PROCEDURE — 87389 HIV-1 AG W/HIV-1&-2 AB AG IA: CPT

## 2025-01-01 PROCEDURE — 85610 PROTHROMBIN TIME: CPT

## 2025-01-01 PROCEDURE — 83605 ASSAY OF LACTIC ACID: CPT

## 2025-01-01 PROCEDURE — 99497 ADVNCD CARE PLAN 30 MIN: CPT | Mod: 2W

## 2025-01-01 PROCEDURE — 99291 CRITICAL CARE FIRST HOUR: CPT | Mod: 25

## 2025-01-01 PROCEDURE — 83036 HEMOGLOBIN GLYCOSYLATED A1C: CPT

## 2025-01-01 PROCEDURE — 87205 SMEAR GRAM STAIN: CPT

## 2025-01-01 PROCEDURE — 74018 RADEX ABDOMEN 1 VIEW: CPT | Mod: 26

## 2025-01-01 PROCEDURE — 99497 ADVNCD CARE PLAN 30 MIN: CPT

## 2025-01-01 PROCEDURE — 94640 AIRWAY INHALATION TREATMENT: CPT

## 2025-01-01 PROCEDURE — 85025 COMPLETE CBC W/AUTO DIFF WBC: CPT

## 2025-01-01 PROCEDURE — 82550 ASSAY OF CK (CPK): CPT

## 2025-01-01 PROCEDURE — 71045 X-RAY EXAM CHEST 1 VIEW: CPT | Mod: 26,77

## 2025-01-01 PROCEDURE — 71250 CT THORAX DX C-: CPT

## 2025-01-01 PROCEDURE — 87594 PNEUMCYSTS JIROVECII AMP PRB: CPT

## 2025-01-01 PROCEDURE — 87305 ASPERGILLUS AG IA: CPT

## 2025-01-01 PROCEDURE — 93010 ELECTROCARDIOGRAM REPORT: CPT

## 2025-01-01 PROCEDURE — 82803 BLOOD GASES ANY COMBINATION: CPT

## 2025-01-01 PROCEDURE — 99285 EMERGENCY DEPT VISIT HI MDM: CPT

## 2025-01-01 PROCEDURE — 82746 ASSAY OF FOLIC ACID SERUM: CPT

## 2025-01-01 PROCEDURE — 31624 DX BRONCHOSCOPE/LAVAGE: CPT

## 2025-01-01 PROCEDURE — 87040 BLOOD CULTURE FOR BACTERIA: CPT

## 2025-01-01 PROCEDURE — 87070 CULTURE OTHR SPECIMN AEROBIC: CPT

## 2025-01-01 PROCEDURE — 82962 GLUCOSE BLOOD TEST: CPT

## 2025-01-01 PROCEDURE — 84132 ASSAY OF SERUM POTASSIUM: CPT

## 2025-01-01 PROCEDURE — 80053 COMPREHEN METABOLIC PANEL: CPT

## 2025-01-01 PROCEDURE — 83540 ASSAY OF IRON: CPT

## 2025-01-01 PROCEDURE — 85730 THROMBOPLASTIN TIME PARTIAL: CPT

## 2025-01-01 PROCEDURE — 93306 TTE W/DOPPLER COMPLETE: CPT

## 2025-01-01 PROCEDURE — 84145 PROCALCITONIN (PCT): CPT

## 2025-01-01 PROCEDURE — 82728 ASSAY OF FERRITIN: CPT

## 2025-01-01 PROCEDURE — 74174 CTA ABD&PLVS W/CONTRAST: CPT | Mod: 26

## 2025-01-01 PROCEDURE — 80048 BASIC METABOLIC PNL TOTAL CA: CPT

## 2025-01-01 PROCEDURE — 85652 RBC SED RATE AUTOMATED: CPT

## 2025-01-01 PROCEDURE — 87015 SPECIMEN INFECT AGNT CONCNTJ: CPT

## 2025-01-01 PROCEDURE — 84478 ASSAY OF TRIGLYCERIDES: CPT

## 2025-01-01 PROCEDURE — 87116 MYCOBACTERIA CULTURE: CPT

## 2025-01-01 PROCEDURE — 93005 ELECTROCARDIOGRAM TRACING: CPT

## 2025-01-01 PROCEDURE — 71250 CT THORAX DX C-: CPT | Mod: 26

## 2025-01-01 PROCEDURE — 84100 ASSAY OF PHOSPHORUS: CPT

## 2025-01-01 PROCEDURE — 80061 LIPID PANEL: CPT

## 2025-01-01 PROCEDURE — 84295 ASSAY OF SERUM SODIUM: CPT

## 2025-01-01 PROCEDURE — 82607 VITAMIN B-12: CPT

## 2025-01-01 PROCEDURE — 93970 EXTREMITY STUDY: CPT

## 2025-01-01 PROCEDURE — 94003 VENT MGMT INPAT SUBQ DAY: CPT

## 2025-01-01 PROCEDURE — 87206 SMEAR FLUORESCENT/ACID STAI: CPT

## 2025-01-01 PROCEDURE — 80202 ASSAY OF VANCOMYCIN: CPT

## 2025-01-01 PROCEDURE — 83735 ASSAY OF MAGNESIUM: CPT

## 2025-01-01 PROCEDURE — 94002 VENT MGMT INPAT INIT DAY: CPT

## 2025-01-01 PROCEDURE — 82330 ASSAY OF CALCIUM: CPT

## 2025-01-01 PROCEDURE — 83615 LACTATE (LD) (LDH) ENZYME: CPT

## 2025-01-01 PROCEDURE — 85027 COMPLETE CBC AUTOMATED: CPT

## 2025-01-01 PROCEDURE — 74174 CTA ABD&PLVS W/CONTRAST: CPT

## 2025-01-01 RX ORDER — ACETAMINOPHEN 500 MG/5ML
1000 LIQUID (ML) ORAL ONCE
Refills: 0 | Status: COMPLETED | OUTPATIENT
Start: 2025-01-01 | End: 2025-01-01

## 2025-01-01 RX ORDER — EZETIMIBE 10 MG/1
10 TABLET ORAL DAILY
Refills: 0 | Status: DISCONTINUED | OUTPATIENT
Start: 2025-01-01 | End: 2025-01-01

## 2025-01-01 RX ORDER — VANCOMYCIN HCL IN 5 % DEXTROSE 1.5G/250ML
1250 PLASTIC BAG, INJECTION (ML) INTRAVENOUS ONCE
Refills: 0 | Status: COMPLETED | OUTPATIENT
Start: 2025-01-01 | End: 2025-01-01

## 2025-01-01 RX ORDER — AMIODARONE HYDROCHLORIDE 50 MG/ML
200 INJECTION, SOLUTION INTRAVENOUS DAILY
Refills: 0 | Status: DISCONTINUED | OUTPATIENT
Start: 2025-01-01 | End: 2025-01-01

## 2025-01-01 RX ORDER — BUMETANIDE 1 MG/1
1 TABLET ORAL
Refills: 0 | Status: DISCONTINUED | OUTPATIENT
Start: 2025-01-01 | End: 2025-01-01

## 2025-01-01 RX ORDER — APIXABAN 2.5 MG/1
5 TABLET, FILM COATED ORAL EVERY 12 HOURS
Refills: 0 | Status: DISCONTINUED | OUTPATIENT
Start: 2025-01-01 | End: 2025-01-01

## 2025-01-01 RX ORDER — FENTANYL CITRATE-0.9 % NACL/PF 100MCG/2ML
0.51 SYRINGE (ML) INTRAVENOUS
Qty: 2500 | Refills: 0 | Status: DISCONTINUED | OUTPATIENT
Start: 2025-01-01 | End: 2025-01-01

## 2025-01-01 RX ORDER — DEXTROSE 50 % IN WATER 50 %
25 SYRINGE (ML) INTRAVENOUS ONCE
Refills: 0 | Status: DISCONTINUED | OUTPATIENT
Start: 2025-01-01 | End: 2025-01-01

## 2025-01-01 RX ORDER — SENNA 187 MG
2 TABLET ORAL AT BEDTIME
Refills: 0 | Status: DISCONTINUED | OUTPATIENT
Start: 2025-01-01 | End: 2025-01-01

## 2025-01-01 RX ORDER — VANCOMYCIN HCL IN 5 % DEXTROSE 1.5G/250ML
1250 PLASTIC BAG, INJECTION (ML) INTRAVENOUS EVERY 24 HOURS
Refills: 0 | Status: DISCONTINUED | OUTPATIENT
Start: 2025-01-01 | End: 2025-01-01

## 2025-01-01 RX ORDER — METOPROLOL SUCCINATE 50 MG/1
25 TABLET, EXTENDED RELEASE ORAL
Refills: 0 | Status: DISCONTINUED | OUTPATIENT
Start: 2025-01-01 | End: 2025-01-01

## 2025-01-01 RX ORDER — MELATONIN 5 MG
5 TABLET ORAL AT BEDTIME
Refills: 0 | Status: DISCONTINUED | OUTPATIENT
Start: 2025-01-01 | End: 2025-01-01

## 2025-01-01 RX ORDER — ATORVASTATIN CALCIUM 80 MG/1
40 TABLET, FILM COATED ORAL AT BEDTIME
Refills: 0 | Status: DISCONTINUED | OUTPATIENT
Start: 2025-01-01 | End: 2025-01-01

## 2025-01-01 RX ORDER — SODIUM ZIRCONIUM CYCLOSILICATE 10 G/10G
10 POWDER, FOR SUSPENSION ORAL EVERY 8 HOURS
Refills: 0 | Status: COMPLETED | OUTPATIENT
Start: 2025-01-01 | End: 2025-01-01

## 2025-01-01 RX ORDER — CEFEPIME 2 G/20ML
2000 INJECTION, POWDER, FOR SOLUTION INTRAVENOUS EVERY 12 HOURS
Refills: 0 | Status: DISCONTINUED | OUTPATIENT
Start: 2025-01-01 | End: 2025-01-01

## 2025-01-01 RX ORDER — BUMETANIDE 1 MG/1
1 TABLET ORAL EVERY 12 HOURS
Refills: 0 | Status: DISCONTINUED | OUTPATIENT
Start: 2025-01-01 | End: 2025-01-01

## 2025-01-01 RX ORDER — PROPOFOL 10 MG/ML
5 INJECTION, EMULSION INTRAVENOUS
Qty: 500 | Refills: 0 | Status: DISCONTINUED | OUTPATIENT
Start: 2025-01-01 | End: 2025-01-01

## 2025-01-01 RX ORDER — IPRATROPIUM BROMIDE AND ALBUTEROL SULFATE .5; 2.5 MG/3ML; MG/3ML
3 SOLUTION RESPIRATORY (INHALATION) EVERY 6 HOURS
Refills: 0 | Status: DISCONTINUED | OUTPATIENT
Start: 2025-01-01 | End: 2025-01-01

## 2025-01-01 RX ORDER — MIDODRINE HYDROCHLORIDE 5 MG/1
10 TABLET ORAL EVERY 8 HOURS
Refills: 0 | Status: DISCONTINUED | OUTPATIENT
Start: 2025-01-01 | End: 2025-01-01

## 2025-01-01 RX ORDER — METHYLPREDNISOLONE ACETATE 80 MG/ML
60 INJECTION, SUSPENSION INTRA-ARTICULAR; INTRALESIONAL; INTRAMUSCULAR; SOFT TISSUE ONCE
Refills: 0 | Status: COMPLETED | OUTPATIENT
Start: 2025-01-01 | End: 2025-01-01

## 2025-01-01 RX ORDER — FUROSEMIDE 10 MG/ML
40 INJECTION INTRAMUSCULAR; INTRAVENOUS EVERY 6 HOURS
Refills: 0 | Status: DISCONTINUED | OUTPATIENT
Start: 2025-01-01 | End: 2025-01-01

## 2025-01-01 RX ORDER — SODIUM CHLORIDE 9 G/1000ML
1000 INJECTION, SOLUTION INTRAVENOUS
Refills: 0 | Status: DISCONTINUED | OUTPATIENT
Start: 2025-01-01 | End: 2025-01-01

## 2025-01-01 RX ORDER — INSULIN GLARGINE-YFGN 100 [IU]/ML
10 INJECTION, SOLUTION SUBCUTANEOUS AT BEDTIME
Refills: 0 | Status: DISCONTINUED | OUTPATIENT
Start: 2025-01-01 | End: 2025-01-13

## 2025-01-01 RX ORDER — LORAZEPAM 4 MG/ML
1 VIAL (ML) INJECTION ONCE
Refills: 0 | Status: DISCONTINUED | OUTPATIENT
Start: 2025-01-01 | End: 2025-01-01

## 2025-01-01 RX ORDER — SODIUM ZIRCONIUM CYCLOSILICATE 5 G/5G
5 POWDER, FOR SUSPENSION ORAL ONCE
Refills: 0 | Status: COMPLETED | OUTPATIENT
Start: 2025-01-01 | End: 2025-01-01

## 2025-01-01 RX ORDER — CLOPIDOGREL BISULFATE 75 MG/1
75 TABLET, FILM COATED ORAL DAILY
Refills: 0 | Status: DISCONTINUED | OUTPATIENT
Start: 2025-01-01 | End: 2025-01-01

## 2025-01-01 RX ORDER — BUMETANIDE 1 MG/1
2 TABLET ORAL ONCE
Refills: 0 | Status: COMPLETED | OUTPATIENT
Start: 2025-01-01 | End: 2025-01-01

## 2025-01-01 RX ORDER — NOREPINEPHRINE BITARTRATE 8 MG
0.05 SOLUTION INTRAVENOUS
Qty: 8 | Refills: 0 | Status: DISCONTINUED | OUTPATIENT
Start: 2025-01-01 | End: 2025-01-01

## 2025-01-01 RX ORDER — VANCOMYCIN HCL IN 5 % DEXTROSE 1.5G/250ML
1250 PLASTIC BAG, INJECTION (ML) INTRAVENOUS ONCE
Refills: 0 | Status: DISCONTINUED | OUTPATIENT
Start: 2025-01-01 | End: 2025-01-01

## 2025-01-01 RX ORDER — SODIUM BICARBONATE 1 MEQ/ML
650 SYRINGE (ML) INTRAVENOUS EVERY 8 HOURS
Refills: 0 | Status: DISCONTINUED | OUTPATIENT
Start: 2025-01-01 | End: 2025-01-01

## 2025-01-01 RX ORDER — FENTANYL CITRATE-0.9 % NACL/PF 100MCG/2ML
0.5 SYRINGE (ML) INTRAVENOUS
Qty: 2500 | Refills: 0 | Status: DISCONTINUED | OUTPATIENT
Start: 2025-01-01 | End: 2025-01-01

## 2025-01-01 RX ORDER — FUROSEMIDE 10 MG/ML
40 INJECTION INTRAMUSCULAR; INTRAVENOUS DAILY
Refills: 0 | Status: DISCONTINUED | OUTPATIENT
Start: 2025-01-01 | End: 2025-01-01

## 2025-01-01 RX ORDER — IPRATROPIUM BROMIDE AND ALBUTEROL SULFATE .5; 2.5 MG/3ML; MG/3ML
3 SOLUTION RESPIRATORY (INHALATION)
Refills: 0 | Status: COMPLETED | OUTPATIENT
Start: 2025-01-01 | End: 2025-01-01

## 2025-01-01 RX ORDER — DEXTROSE MONOHYDRATE 25 G/50ML
25 INJECTION, SOLUTION INTRAVENOUS ONCE
Refills: 0 | Status: DISCONTINUED | OUTPATIENT
Start: 2025-01-01 | End: 2025-01-16

## 2025-01-01 RX ORDER — ONDANSETRON HCL/PF 4 MG/2 ML
4 VIAL (ML) INJECTION EVERY 8 HOURS
Refills: 0 | Status: DISCONTINUED | OUTPATIENT
Start: 2025-01-01 | End: 2025-01-01

## 2025-01-01 RX ORDER — CEFEPIME 2 G/20ML
INJECTION, POWDER, FOR SOLUTION INTRAVENOUS
Refills: 0 | Status: DISCONTINUED | OUTPATIENT
Start: 2025-01-01 | End: 2025-01-01

## 2025-01-01 RX ORDER — IBUPROFEN 200 MG
800 TABLET ORAL ONCE
Refills: 0 | Status: COMPLETED | OUTPATIENT
Start: 2025-01-01 | End: 2025-01-01

## 2025-01-01 RX ORDER — DEXTROMETHORPHAN HBR, GUAIFENESIN 200 MG/10ML
200 LIQUID ORAL EVERY 6 HOURS
Refills: 0 | Status: DISCONTINUED | OUTPATIENT
Start: 2025-01-01 | End: 2025-01-01

## 2025-01-01 RX ORDER — INSULIN LISPRO 100 U/ML
8 INJECTION, SOLUTION INTRAVENOUS; SUBCUTANEOUS EVERY 6 HOURS
Refills: 0 | Status: DISCONTINUED | OUTPATIENT
Start: 2025-01-01 | End: 2025-01-01

## 2025-01-01 RX ORDER — MEROPENEM 1 G/30ML
1000 INJECTION INTRAVENOUS ONCE
Refills: 0 | Status: COMPLETED | OUTPATIENT
Start: 2025-01-01 | End: 2025-01-01

## 2025-01-01 RX ORDER — HYDROMORPHONE/SOD CHLOR,ISO/PF 2 MG/10 ML
1 SYRINGE (ML) INJECTION
Refills: 0 | Status: DISCONTINUED | OUTPATIENT
Start: 2025-01-01 | End: 2025-01-01

## 2025-01-01 RX ORDER — QUETIAPINE FUMARATE 25 MG/1
25 TABLET ORAL AT BEDTIME
Refills: 0 | Status: DISCONTINUED | OUTPATIENT
Start: 2025-01-01 | End: 2025-01-01

## 2025-01-01 RX ORDER — GLUCAGON INJECTION, SOLUTION 0.5 MG/.1ML
1 INJECTION, SOLUTION SUBCUTANEOUS ONCE
Refills: 0 | Status: DISCONTINUED | OUTPATIENT
Start: 2025-01-01 | End: 2025-01-16

## 2025-01-01 RX ORDER — INSULIN LISPRO 100 U/ML
INJECTION, SOLUTION INTRAVENOUS; SUBCUTANEOUS
Refills: 0 | Status: DISCONTINUED | OUTPATIENT
Start: 2025-01-01 | End: 2025-01-01

## 2025-01-01 RX ORDER — DEXMEDETOMIDINE HYDROCHLORIDE IN SODIUM CHLORIDE 4 UG/ML
0.05 INJECTION INTRAVENOUS
Qty: 400 | Refills: 0 | Status: DISCONTINUED | OUTPATIENT
Start: 2025-01-01 | End: 2025-01-01

## 2025-01-01 RX ORDER — LORAZEPAM 4 MG/ML
1 VIAL (ML) INJECTION
Refills: 0 | Status: DISCONTINUED | OUTPATIENT
Start: 2025-01-01 | End: 2025-01-01

## 2025-01-01 RX ORDER — FENOFIBRATE 160 MG/1
145 TABLET ORAL DAILY
Refills: 0 | Status: DISCONTINUED | OUTPATIENT
Start: 2025-01-01 | End: 2025-01-01

## 2025-01-01 RX ORDER — SODIUM ZIRCONIUM CYCLOSILICATE 5 G/5G
10 POWDER, FOR SUSPENSION ORAL ONCE
Refills: 0 | Status: COMPLETED | OUTPATIENT
Start: 2025-01-01 | End: 2025-01-01

## 2025-01-01 RX ORDER — MAGNESIUM, ALUMINUM HYDROXIDE 200-200 MG
30 TABLET,CHEWABLE ORAL EVERY 4 HOURS
Refills: 0 | Status: DISCONTINUED | OUTPATIENT
Start: 2025-01-01 | End: 2025-01-01

## 2025-01-01 RX ORDER — GLYCOPYRROLATE 0.2 MG/ML
0.2 INJECTION INTRAMUSCULAR; INTRAVENOUS ONCE
Refills: 0 | Status: COMPLETED | OUTPATIENT
Start: 2025-01-01 | End: 2025-01-01

## 2025-01-01 RX ORDER — BUMETANIDE 1 MG/1
1 TABLET ORAL ONCE
Refills: 0 | Status: COMPLETED | OUTPATIENT
Start: 2025-01-01 | End: 2025-01-01

## 2025-01-01 RX ORDER — MEROPENEM 1 G/30ML
INJECTION INTRAVENOUS
Refills: 0 | Status: DISCONTINUED | OUTPATIENT
Start: 2025-01-01 | End: 2025-01-01

## 2025-01-01 RX ORDER — METOPROLOL SUCCINATE 50 MG/1
50 TABLET, EXTENDED RELEASE ORAL DAILY
Refills: 0 | Status: DISCONTINUED | OUTPATIENT
Start: 2025-01-01 | End: 2025-01-01

## 2025-01-01 RX ORDER — DEXTROSE 50 % IN WATER 50 %
12.5 SYRINGE (ML) INTRAVENOUS ONCE
Refills: 0 | Status: DISCONTINUED | OUTPATIENT
Start: 2025-01-01 | End: 2025-01-01

## 2025-01-01 RX ORDER — GLUCAGON 3 MG/1
1 POWDER NASAL ONCE
Refills: 0 | Status: DISCONTINUED | OUTPATIENT
Start: 2025-01-01 | End: 2025-01-01

## 2025-01-01 RX ORDER — ACETAMINOPHEN 500 MG/5ML
650 LIQUID (ML) ORAL EVERY 6 HOURS
Refills: 0 | Status: DISCONTINUED | OUTPATIENT
Start: 2025-01-01 | End: 2025-01-01

## 2025-01-01 RX ORDER — APIXABAN 2.5 MG/1
5 TABLET, FILM COATED ORAL
Refills: 0 | Status: DISCONTINUED | OUTPATIENT
Start: 2025-01-01 | End: 2025-01-01

## 2025-01-01 RX ORDER — INSULIN GLARGINE-YFGN 100 [IU]/ML
34 INJECTION, SOLUTION SUBCUTANEOUS AT BEDTIME
Refills: 0 | Status: DISCONTINUED | OUTPATIENT
Start: 2025-01-01 | End: 2025-01-01

## 2025-01-01 RX ORDER — MIDAZOLAM IN 0.9 % SOD.CHLORID 1 MG/ML
5 PLASTIC BAG, INJECTION (ML) INTRAVENOUS ONCE
Refills: 0 | Status: DISCONTINUED | OUTPATIENT
Start: 2025-01-01 | End: 2025-01-01

## 2025-01-01 RX ORDER — DEXTROMETHORPHAN HBR, GUAIFENESIN 200 MG/10ML
600 LIQUID ORAL EVERY 12 HOURS
Refills: 0 | Status: DISCONTINUED | OUTPATIENT
Start: 2025-01-01 | End: 2025-01-01

## 2025-01-01 RX ORDER — VANCOMYCIN HCL IN 5 % DEXTROSE 1.5G/250ML
750 PLASTIC BAG, INJECTION (ML) INTRAVENOUS ONCE
Refills: 0 | Status: COMPLETED | OUTPATIENT
Start: 2025-01-01 | End: 2025-01-01

## 2025-01-01 RX ORDER — VANCOMYCIN HCL IN 5 % DEXTROSE 1.5G/250ML
1000 PLASTIC BAG, INJECTION (ML) INTRAVENOUS EVERY 24 HOURS
Refills: 0 | Status: DISCONTINUED | OUTPATIENT
Start: 2025-01-01 | End: 2025-01-01

## 2025-01-01 RX ORDER — CEFEPIME 2 G/20ML
2000 INJECTION, POWDER, FOR SOLUTION INTRAVENOUS ONCE
Refills: 0 | Status: COMPLETED | OUTPATIENT
Start: 2025-01-01 | End: 2025-01-01

## 2025-01-01 RX ORDER — INSULIN GLARGINE-YFGN 100 [IU]/ML
34 INJECTION, SOLUTION SUBCUTANEOUS EVERY MORNING
Refills: 0 | Status: DISCONTINUED | OUTPATIENT
Start: 2025-01-01 | End: 2025-01-01

## 2025-01-01 RX ORDER — CEFTRIAXONE 500 MG/1
1000 INJECTION, POWDER, FOR SOLUTION INTRAMUSCULAR; INTRAVENOUS EVERY 24 HOURS
Refills: 0 | Status: DISCONTINUED | OUTPATIENT
Start: 2025-01-01 | End: 2025-01-01

## 2025-01-01 RX ORDER — BUMETANIDE 1 MG/1
2 TABLET ORAL EVERY 12 HOURS
Refills: 0 | Status: DISCONTINUED | OUTPATIENT
Start: 2025-01-01 | End: 2025-01-01

## 2025-01-01 RX ORDER — MIDAZOLAM IN 0.9 % SOD.CHLORID 1 MG/ML
2 PLASTIC BAG, INJECTION (ML) INTRAVENOUS ONCE
Refills: 0 | Status: DISCONTINUED | OUTPATIENT
Start: 2025-01-01 | End: 2025-01-01

## 2025-01-01 RX ORDER — MEROPENEM 1 G/30ML
1000 INJECTION INTRAVENOUS EVERY 8 HOURS
Refills: 0 | Status: DISCONTINUED | OUTPATIENT
Start: 2025-01-01 | End: 2025-01-01

## 2025-01-01 RX ORDER — DEXTROSE MONOHYDRATE 25 G/50ML
25 INJECTION, SOLUTION INTRAVENOUS ONCE
Refills: 0 | Status: COMPLETED | OUTPATIENT
Start: 2025-01-01 | End: 2025-01-01

## 2025-01-01 RX ORDER — BUMETANIDE 1 MG/1
1 TABLET ORAL DAILY
Refills: 0 | Status: DISCONTINUED | OUTPATIENT
Start: 2025-01-01 | End: 2025-01-01

## 2025-01-01 RX ORDER — SACUBITRIL AND VALSARTAN 6; 6 MG/1; MG/1
1 PELLET ORAL
Refills: 0 | Status: DISCONTINUED | OUTPATIENT
Start: 2025-01-01 | End: 2025-01-01

## 2025-01-01 RX ORDER — DEXTROSE MONOHYDRATE 25 G/50ML
50 INJECTION, SOLUTION INTRAVENOUS ONCE
Refills: 0 | Status: COMPLETED | OUTPATIENT
Start: 2025-01-01 | End: 2025-01-01

## 2025-01-01 RX ORDER — INSULIN GLARGINE-YFGN 100 [IU]/ML
17 INJECTION, SOLUTION SUBCUTANEOUS ONCE
Refills: 0 | Status: COMPLETED | OUTPATIENT
Start: 2025-01-01 | End: 2025-01-01

## 2025-01-01 RX ORDER — FUROSEMIDE 10 MG/ML
40 INJECTION INTRAMUSCULAR; INTRAVENOUS ONCE
Refills: 0 | Status: COMPLETED | OUTPATIENT
Start: 2025-01-01 | End: 2025-01-01

## 2025-01-01 RX ORDER — DEXTROSE MONOHYDRATE 25 G/50ML
12.5 INJECTION, SOLUTION INTRAVENOUS ONCE
Refills: 0 | Status: DISCONTINUED | OUTPATIENT
Start: 2025-01-01 | End: 2025-01-16

## 2025-01-01 RX ORDER — AZITHROMYCIN 250 MG
500 CAPSULE ORAL EVERY 24 HOURS
Refills: 0 | Status: DISCONTINUED | OUTPATIENT
Start: 2025-01-01 | End: 2025-01-01

## 2025-01-01 RX ORDER — TORSEMIDE 20 MG/1
20 TABLET ORAL DAILY
Refills: 0 | Status: DISCONTINUED | OUTPATIENT
Start: 2025-01-01 | End: 2025-01-01

## 2025-01-01 RX ORDER — FENTANYL CITRATE-0.9 % NACL/PF 100MCG/2ML
25 SYRINGE (ML) INTRAVENOUS ONCE
Refills: 0 | Status: DISCONTINUED | OUTPATIENT
Start: 2025-01-01 | End: 2025-01-01

## 2025-01-01 RX ORDER — INSULIN LISPRO 100 U/ML
INJECTION, SOLUTION INTRAVENOUS; SUBCUTANEOUS EVERY 6 HOURS
Refills: 0 | Status: DISCONTINUED | OUTPATIENT
Start: 2025-01-01 | End: 2025-01-01

## 2025-01-01 RX ORDER — MIDODRINE HYDROCHLORIDE 5 MG/1
5 TABLET ORAL THREE TIMES A DAY
Refills: 0 | Status: DISCONTINUED | OUTPATIENT
Start: 2025-01-01 | End: 2025-01-01

## 2025-01-01 RX ORDER — METHYLPREDNISOLONE ACETATE 80 MG/ML
125 INJECTION, SUSPENSION INTRA-ARTICULAR; INTRALESIONAL; INTRAMUSCULAR; SOFT TISSUE ONCE
Refills: 0 | Status: COMPLETED | OUTPATIENT
Start: 2025-01-01 | End: 2025-01-01

## 2025-01-01 RX ORDER — CEFEPIME 2 G/20ML
1000 INJECTION, POWDER, FOR SOLUTION INTRAVENOUS EVERY 12 HOURS
Refills: 0 | Status: DISCONTINUED | OUTPATIENT
Start: 2025-01-01 | End: 2025-01-01

## 2025-01-01 RX ORDER — MEROPENEM 1 G/30ML
1000 INJECTION INTRAVENOUS EVERY 12 HOURS
Refills: 0 | Status: DISCONTINUED | OUTPATIENT
Start: 2025-01-01 | End: 2025-01-01

## 2025-01-01 RX ORDER — PROPOFOL 10 MG/ML
5 INJECTION, EMULSION INTRAVENOUS
Qty: 1000 | Refills: 0 | Status: DISCONTINUED | OUTPATIENT
Start: 2025-01-01 | End: 2025-01-01

## 2025-01-01 RX ORDER — VANCOMYCIN HCL IN 5 % DEXTROSE 1.5G/250ML
750 PLASTIC BAG, INJECTION (ML) INTRAVENOUS EVERY 24 HOURS
Refills: 0 | Status: DISCONTINUED | OUTPATIENT
Start: 2025-01-01 | End: 2025-01-01

## 2025-01-01 RX ORDER — POLYETHYLENE GLYCOL 3350 17 G/17G
17 POWDER, FOR SOLUTION ORAL
Refills: 0 | Status: DISCONTINUED | OUTPATIENT
Start: 2025-01-01 | End: 2025-01-01

## 2025-01-01 RX ORDER — TIOTROPIUM BROMIDE INHALATION SPRAY 3.12 UG/1
2 SPRAY, METERED RESPIRATORY (INHALATION) DAILY
Refills: 0 | Status: DISCONTINUED | OUTPATIENT
Start: 2025-01-01 | End: 2025-01-01

## 2025-01-01 RX ORDER — VANCOMYCIN HCL IN 5 % DEXTROSE 1.5G/250ML
1250 PLASTIC BAG, INJECTION (ML) INTRAVENOUS EVERY 12 HOURS
Refills: 0 | Status: DISCONTINUED | OUTPATIENT
Start: 2025-01-01 | End: 2025-01-01

## 2025-01-01 RX ORDER — DEXMEDETOMIDINE HYDROCHLORIDE IN SODIUM CHLORIDE 4 UG/ML
0.4 INJECTION INTRAVENOUS
Qty: 200 | Refills: 0 | Status: DISCONTINUED | OUTPATIENT
Start: 2025-01-01 | End: 2025-01-01

## 2025-01-01 RX ORDER — SODIUM ZIRCONIUM CYCLOSILICATE 5 G/5G
10 POWDER, FOR SUSPENSION ORAL DAILY
Refills: 0 | Status: COMPLETED | OUTPATIENT
Start: 2025-01-01 | End: 2025-01-01

## 2025-01-01 RX ORDER — DEXTROSE 50 % IN WATER 50 %
15 SYRINGE (ML) INTRAVENOUS ONCE
Refills: 0 | Status: DISCONTINUED | OUTPATIENT
Start: 2025-01-01 | End: 2025-01-01

## 2025-01-01 RX ORDER — INSULIN LISPRO 100 U/ML
INJECTION, SOLUTION INTRAVENOUS; SUBCUTANEOUS AT BEDTIME
Refills: 0 | Status: DISCONTINUED | OUTPATIENT
Start: 2025-01-01 | End: 2025-01-01

## 2025-01-01 RX ORDER — MUPIROCIN CALCIUM 20 MG/G
1 CREAM TOPICAL EVERY 12 HOURS
Refills: 0 | Status: COMPLETED | OUTPATIENT
Start: 2025-01-01 | End: 2025-01-01

## 2025-01-01 RX ORDER — VANCOMYCIN HCL IN 5 % DEXTROSE 1.5G/250ML
PLASTIC BAG, INJECTION (ML) INTRAVENOUS
Refills: 0 | Status: DISCONTINUED | OUTPATIENT
Start: 2025-01-01 | End: 2025-01-01

## 2025-01-01 RX ORDER — DAPAGLIFLOZIN 5 MG/1
10 TABLET, FILM COATED ORAL DAILY
Refills: 0 | Status: DISCONTINUED | OUTPATIENT
Start: 2025-01-01 | End: 2025-01-01

## 2025-01-01 RX ORDER — PROPOFOL 10 MG/ML
10 INJECTION, EMULSION INTRAVENOUS ONCE
Refills: 0 | Status: COMPLETED | OUTPATIENT
Start: 2025-01-01 | End: 2025-01-01

## 2025-01-01 RX ADMIN — INSULIN LISPRO 6: 100 INJECTION, SOLUTION INTRAVENOUS; SUBCUTANEOUS at 23:59

## 2025-01-01 RX ADMIN — CLOPIDOGREL BISULFATE 75 MILLIGRAM(S): 75 TABLET, FILM COATED ORAL at 13:04

## 2025-01-01 RX ADMIN — INSULIN LISPRO 8 UNIT(S): 100 INJECTION, SOLUTION INTRAVENOUS; SUBCUTANEOUS at 17:41

## 2025-01-01 RX ADMIN — INSULIN LISPRO 8 UNIT(S): 100 INJECTION, SOLUTION INTRAVENOUS; SUBCUTANEOUS at 11:37

## 2025-01-01 RX ADMIN — DEXMEDETOMIDINE HYDROCHLORIDE IN SODIUM CHLORIDE 1.04 MICROGRAM(S)/KG/HR: 4 INJECTION INTRAVENOUS at 03:16

## 2025-01-01 RX ADMIN — BUMETANIDE 1 MILLIGRAM(S): 1 TABLET ORAL at 14:42

## 2025-01-01 RX ADMIN — IPRATROPIUM BROMIDE AND ALBUTEROL SULFATE 3 MILLILITER(S): .5; 2.5 SOLUTION RESPIRATORY (INHALATION) at 14:14

## 2025-01-01 RX ADMIN — APIXABAN 5 MILLIGRAM(S): 2.5 TABLET, FILM COATED ORAL at 17:56

## 2025-01-01 RX ADMIN — Medication 40 MILLIGRAM(S): at 17:49

## 2025-01-01 RX ADMIN — METOPROLOL SUCCINATE 25 MILLIGRAM(S): 50 TABLET, EXTENDED RELEASE ORAL at 05:21

## 2025-01-01 RX ADMIN — BUMETANIDE 1 MILLIGRAM(S): 1 TABLET ORAL at 05:38

## 2025-01-01 RX ADMIN — Medication 15 MILLILITER(S): at 05:37

## 2025-01-01 RX ADMIN — Medication 650 MILLIGRAM(S): at 05:43

## 2025-01-01 RX ADMIN — MIDODRINE HYDROCHLORIDE 10 MILLIGRAM(S): 5 TABLET ORAL at 06:04

## 2025-01-01 RX ADMIN — AMIODARONE HYDROCHLORIDE 200 MILLIGRAM(S): 50 INJECTION, SOLUTION INTRAVENOUS at 05:24

## 2025-01-01 RX ADMIN — Medication 4.17 MICROGRAM(S)/KG/HR: at 23:58

## 2025-01-01 RX ADMIN — MUPIROCIN CALCIUM 1 APPLICATION(S): 20 CREAM TOPICAL at 17:30

## 2025-01-01 RX ADMIN — CEFEPIME 100 MILLIGRAM(S): 2 INJECTION, POWDER, FOR SOLUTION INTRAVENOUS at 17:11

## 2025-01-01 RX ADMIN — CEFEPIME 100 MILLIGRAM(S): 2 INJECTION, POWDER, FOR SOLUTION INTRAVENOUS at 05:59

## 2025-01-01 RX ADMIN — Medication 4.17 MICROGRAM(S)/KG/HR: at 12:35

## 2025-01-01 RX ADMIN — INSULIN LISPRO 2: 100 INJECTION, SOLUTION INTRAVENOUS; SUBCUTANEOUS at 17:44

## 2025-01-01 RX ADMIN — EZETIMIBE 10 MILLIGRAM(S): 10 TABLET ORAL at 11:15

## 2025-01-01 RX ADMIN — CLOPIDOGREL BISULFATE 75 MILLIGRAM(S): 75 TABLET, FILM COATED ORAL at 11:35

## 2025-01-01 RX ADMIN — Medication 25 MICROGRAM(S): at 14:15

## 2025-01-01 RX ADMIN — AMIODARONE HYDROCHLORIDE 200 MILLIGRAM(S): 50 INJECTION, SOLUTION INTRAVENOUS at 05:20

## 2025-01-01 RX ADMIN — Medication 40 MILLIGRAM(S): at 17:56

## 2025-01-01 RX ADMIN — Medication 1 APPLICATION(S): at 05:23

## 2025-01-01 RX ADMIN — APIXABAN 5 MILLIGRAM(S): 2.5 TABLET, FILM COATED ORAL at 17:10

## 2025-01-01 RX ADMIN — IPRATROPIUM BROMIDE AND ALBUTEROL SULFATE 3 MILLILITER(S): .5; 2.5 SOLUTION RESPIRATORY (INHALATION) at 14:40

## 2025-01-01 RX ADMIN — INSULIN LISPRO 6: 100 INJECTION, SOLUTION INTRAVENOUS; SUBCUTANEOUS at 23:02

## 2025-01-01 RX ADMIN — INSULIN LISPRO 6: 100 INJECTION, SOLUTION INTRAVENOUS; SUBCUTANEOUS at 06:23

## 2025-01-01 RX ADMIN — PROPOFOL 2.54 MICROGRAM(S)/KG/MIN: 10 INJECTION, EMULSION INTRAVENOUS at 08:40

## 2025-01-01 RX ADMIN — ATORVASTATIN CALCIUM 40 MILLIGRAM(S): 80 TABLET, FILM COATED ORAL at 22:03

## 2025-01-01 RX ADMIN — Medication 400 MILLIGRAM(S): at 12:09

## 2025-01-01 RX ADMIN — CLOPIDOGREL BISULFATE 75 MILLIGRAM(S): 75 TABLET, FILM COATED ORAL at 12:34

## 2025-01-01 RX ADMIN — AMIODARONE HYDROCHLORIDE 200 MILLIGRAM(S): 50 INJECTION, SOLUTION INTRAVENOUS at 05:50

## 2025-01-01 RX ADMIN — Medication 650 MILLIGRAM(S): at 00:07

## 2025-01-01 RX ADMIN — APIXABAN 5 MILLIGRAM(S): 2.5 TABLET, FILM COATED ORAL at 17:31

## 2025-01-01 RX ADMIN — SODIUM ZIRCONIUM CYCLOSILICATE 5 GRAM(S): 5 POWDER, FOR SUSPENSION ORAL at 08:49

## 2025-01-01 RX ADMIN — DEXMEDETOMIDINE HYDROCHLORIDE IN SODIUM CHLORIDE 1.04 MICROGRAM(S)/KG/HR: 4 INJECTION INTRAVENOUS at 12:55

## 2025-01-01 RX ADMIN — DEXMEDETOMIDINE HYDROCHLORIDE IN SODIUM CHLORIDE 1.04 MICROGRAM(S)/KG/HR: 4 INJECTION INTRAVENOUS at 00:33

## 2025-01-01 RX ADMIN — INSULIN LISPRO 2: 100 INJECTION, SOLUTION INTRAVENOUS; SUBCUTANEOUS at 05:36

## 2025-01-01 RX ADMIN — INSULIN LISPRO 6: 100 INJECTION, SOLUTION INTRAVENOUS; SUBCUTANEOUS at 05:58

## 2025-01-01 RX ADMIN — Medication 15 MILLILITER(S): at 17:11

## 2025-01-01 RX ADMIN — MIDODRINE HYDROCHLORIDE 10 MILLIGRAM(S): 5 TABLET ORAL at 14:59

## 2025-01-01 RX ADMIN — EZETIMIBE 10 MILLIGRAM(S): 10 TABLET ORAL at 11:35

## 2025-01-01 RX ADMIN — INSULIN LISPRO 2: 100 INJECTION, SOLUTION INTRAVENOUS; SUBCUTANEOUS at 23:01

## 2025-01-01 RX ADMIN — Medication 15 MILLILITER(S): at 05:19

## 2025-01-01 RX ADMIN — Medication 15 MILLILITER(S): at 17:53

## 2025-01-01 RX ADMIN — Medication 1 APPLICATION(S): at 06:01

## 2025-01-01 RX ADMIN — Medication 15 MILLILITER(S): at 05:15

## 2025-01-01 RX ADMIN — EZETIMIBE 10 MILLIGRAM(S): 10 TABLET ORAL at 14:20

## 2025-01-01 RX ADMIN — ATORVASTATIN CALCIUM 40 MILLIGRAM(S): 80 TABLET, FILM COATED ORAL at 21:47

## 2025-01-01 RX ADMIN — SODIUM CHLORIDE 75 MILLILITER(S): 9 INJECTION, SOLUTION INTRAVENOUS at 05:49

## 2025-01-01 RX ADMIN — IPRATROPIUM BROMIDE AND ALBUTEROL SULFATE 3 MILLILITER(S): .5; 2.5 SOLUTION RESPIRATORY (INHALATION) at 07:58

## 2025-01-01 RX ADMIN — CLOPIDOGREL BISULFATE 75 MILLIGRAM(S): 75 TABLET, FILM COATED ORAL at 12:47

## 2025-01-01 RX ADMIN — APIXABAN 5 MILLIGRAM(S): 2.5 TABLET, FILM COATED ORAL at 17:24

## 2025-01-01 RX ADMIN — EZETIMIBE 10 MILLIGRAM(S): 10 TABLET ORAL at 11:53

## 2025-01-01 RX ADMIN — EZETIMIBE 10 MILLIGRAM(S): 10 TABLET ORAL at 12:35

## 2025-01-01 RX ADMIN — CLOPIDOGREL BISULFATE 75 MILLIGRAM(S): 75 TABLET, FILM COATED ORAL at 12:26

## 2025-01-01 RX ADMIN — MEROPENEM 100 MILLIGRAM(S): 1 INJECTION INTRAVENOUS at 05:08

## 2025-01-01 RX ADMIN — Medication 250 MILLIGRAM(S): at 17:30

## 2025-01-01 RX ADMIN — SODIUM CHLORIDE 75 MILLILITER(S): 9 INJECTION, SOLUTION INTRAVENOUS at 05:24

## 2025-01-01 RX ADMIN — Medication 15 MILLILITER(S): at 17:51

## 2025-01-01 RX ADMIN — Medication 2 MILLIGRAM(S): at 07:22

## 2025-01-01 RX ADMIN — Medication 25 MICROGRAM(S): at 13:53

## 2025-01-01 RX ADMIN — INSULIN LISPRO 8 UNIT(S): 100 INJECTION, SOLUTION INTRAVENOUS; SUBCUTANEOUS at 23:01

## 2025-01-01 RX ADMIN — CEFEPIME 100 MILLIGRAM(S): 2 INJECTION, POWDER, FOR SOLUTION INTRAVENOUS at 18:02

## 2025-01-01 RX ADMIN — Medication 4.23 MICROGRAM(S)/KG/HR: at 19:58

## 2025-01-01 RX ADMIN — NOREPINEPHRINE BITARTRATE 7.92 MICROGRAM(S)/KG/MIN: 8 SOLUTION at 07:49

## 2025-01-01 RX ADMIN — CLOPIDOGREL BISULFATE 75 MILLIGRAM(S): 75 TABLET, FILM COATED ORAL at 11:15

## 2025-01-01 RX ADMIN — Medication 5 MILLIGRAM(S): at 18:02

## 2025-01-01 RX ADMIN — INSULIN GLARGINE-YFGN 34 UNIT(S): 100 INJECTION, SOLUTION SUBCUTANEOUS at 21:30

## 2025-01-01 RX ADMIN — INSULIN LISPRO 0: 100 INJECTION, SOLUTION INTRAVENOUS; SUBCUTANEOUS at 17:12

## 2025-01-01 RX ADMIN — DEXMEDETOMIDINE HYDROCHLORIDE IN SODIUM CHLORIDE 1.04 MICROGRAM(S)/KG/HR: 4 INJECTION INTRAVENOUS at 22:34

## 2025-01-01 RX ADMIN — DEXTROSE MONOHYDRATE 50 MILLILITER(S): 25 INJECTION, SOLUTION INTRAVENOUS at 22:35

## 2025-01-01 RX ADMIN — APIXABAN 5 MILLIGRAM(S): 2.5 TABLET, FILM COATED ORAL at 06:16

## 2025-01-01 RX ADMIN — Medication 15 MILLILITER(S): at 18:02

## 2025-01-01 RX ADMIN — DEXMEDETOMIDINE HYDROCHLORIDE IN SODIUM CHLORIDE 1.04 MICROGRAM(S)/KG/HR: 4 INJECTION INTRAVENOUS at 22:12

## 2025-01-01 RX ADMIN — Medication 40 MILLIGRAM(S): at 18:40

## 2025-01-01 RX ADMIN — IPRATROPIUM BROMIDE AND ALBUTEROL SULFATE 3 MILLILITER(S): .5; 2.5 SOLUTION RESPIRATORY (INHALATION) at 20:55

## 2025-01-01 RX ADMIN — MEROPENEM 100 MILLIGRAM(S): 1 INJECTION INTRAVENOUS at 05:30

## 2025-01-01 RX ADMIN — APIXABAN 5 MILLIGRAM(S): 2.5 TABLET, FILM COATED ORAL at 05:40

## 2025-01-01 RX ADMIN — Medication 4.17 MICROGRAM(S)/KG/HR: at 19:21

## 2025-01-01 RX ADMIN — Medication 4.17 MICROGRAM(S)/KG/HR: at 00:13

## 2025-01-01 RX ADMIN — Medication 15 MILLILITER(S): at 05:04

## 2025-01-01 RX ADMIN — METOPROLOL SUCCINATE 25 MILLIGRAM(S): 50 TABLET, EXTENDED RELEASE ORAL at 17:32

## 2025-01-01 RX ADMIN — INSULIN LISPRO 2: 100 INJECTION, SOLUTION INTRAVENOUS; SUBCUTANEOUS at 00:15

## 2025-01-01 RX ADMIN — SODIUM ZIRCONIUM CYCLOSILICATE 10 GRAM(S): 10 POWDER, FOR SUSPENSION ORAL at 21:32

## 2025-01-01 RX ADMIN — Medication 4.17 MICROGRAM(S)/KG/HR: at 05:17

## 2025-01-01 RX ADMIN — Medication 650 MILLIGRAM(S): at 01:50

## 2025-01-01 RX ADMIN — IPRATROPIUM BROMIDE AND ALBUTEROL SULFATE 3 MILLILITER(S): .5; 2.5 SOLUTION RESPIRATORY (INHALATION) at 09:44

## 2025-01-01 RX ADMIN — DEXTROSE MONOHYDRATE 25 MILLILITER(S): 25 INJECTION, SOLUTION INTRAVENOUS at 21:32

## 2025-01-01 RX ADMIN — IPRATROPIUM BROMIDE AND ALBUTEROL SULFATE 3 MILLILITER(S): .5; 2.5 SOLUTION RESPIRATORY (INHALATION) at 14:07

## 2025-01-01 RX ADMIN — Medication 15 MILLILITER(S): at 05:17

## 2025-01-01 RX ADMIN — METOPROLOL SUCCINATE 25 MILLIGRAM(S): 50 TABLET, EXTENDED RELEASE ORAL at 05:31

## 2025-01-01 RX ADMIN — METHYLPREDNISOLONE ACETATE 60 MILLIGRAM(S): 80 INJECTION, SUSPENSION INTRA-ARTICULAR; INTRALESIONAL; INTRAMUSCULAR; SOFT TISSUE at 07:22

## 2025-01-01 RX ADMIN — Medication 1 APPLICATION(S): at 06:16

## 2025-01-01 RX ADMIN — AMIODARONE HYDROCHLORIDE 200 MILLIGRAM(S): 200 TABLET ORAL at 05:13

## 2025-01-01 RX ADMIN — AMIODARONE HYDROCHLORIDE 200 MILLIGRAM(S): 50 INJECTION, SOLUTION INTRAVENOUS at 05:35

## 2025-01-01 RX ADMIN — NOREPINEPHRINE BITARTRATE 7.92 MICROGRAM(S)/KG/MIN: 8 SOLUTION at 20:09

## 2025-01-01 RX ADMIN — APIXABAN 5 MILLIGRAM(S): 2.5 TABLET, FILM COATED ORAL at 05:16

## 2025-01-01 RX ADMIN — Medication 40 MILLIGRAM(S): at 17:33

## 2025-01-01 RX ADMIN — Medication 1 MILLIGRAM(S): at 05:23

## 2025-01-01 RX ADMIN — Medication 40 MILLIGRAM(S): at 17:55

## 2025-01-01 RX ADMIN — Medication 4.17 MICROGRAM(S)/KG/HR: at 06:40

## 2025-01-01 RX ADMIN — CEFEPIME 100 MILLIGRAM(S): 2 INJECTION, POWDER, FOR SOLUTION INTRAVENOUS at 05:19

## 2025-01-01 RX ADMIN — Medication 650 MILLIGRAM(S): at 03:22

## 2025-01-01 RX ADMIN — INSULIN GLARGINE-YFGN 34 UNIT(S): 100 INJECTION, SOLUTION SUBCUTANEOUS at 22:59

## 2025-01-01 RX ADMIN — ATORVASTATIN CALCIUM 40 MILLIGRAM(S): 40 TABLET, FILM COATED ORAL at 21:32

## 2025-01-01 RX ADMIN — APIXABAN 5 MILLIGRAM(S): 2.5 TABLET, FILM COATED ORAL at 05:32

## 2025-01-01 RX ADMIN — EZETIMIBE 10 MILLIGRAM(S): 10 TABLET ORAL at 12:47

## 2025-01-01 RX ADMIN — IPRATROPIUM BROMIDE AND ALBUTEROL SULFATE 3 MILLILITER(S): .5; 2.5 SOLUTION RESPIRATORY (INHALATION) at 09:21

## 2025-01-01 RX ADMIN — AMIODARONE HYDROCHLORIDE 200 MILLIGRAM(S): 50 INJECTION, SOLUTION INTRAVENOUS at 05:18

## 2025-01-01 RX ADMIN — INSULIN LISPRO 2: 100 INJECTION, SOLUTION INTRAVENOUS; SUBCUTANEOUS at 12:46

## 2025-01-01 RX ADMIN — Medication 2 TABLET(S): at 21:48

## 2025-01-01 RX ADMIN — Medication 15 MILLILITER(S): at 18:08

## 2025-01-01 RX ADMIN — Medication 40 MILLIGRAM(S): at 17:53

## 2025-01-01 RX ADMIN — INSULIN LISPRO 2: 100 INJECTION, SOLUTION INTRAVENOUS; SUBCUTANEOUS at 12:23

## 2025-01-01 RX ADMIN — APIXABAN 5 MILLIGRAM(S): 2.5 TABLET, FILM COATED ORAL at 05:21

## 2025-01-01 RX ADMIN — INSULIN LISPRO 4: 100 INJECTION, SOLUTION INTRAVENOUS; SUBCUTANEOUS at 18:04

## 2025-01-01 RX ADMIN — INSULIN LISPRO 8 UNIT(S): 100 INJECTION, SOLUTION INTRAVENOUS; SUBCUTANEOUS at 17:22

## 2025-01-01 RX ADMIN — AMIODARONE HYDROCHLORIDE 200 MILLIGRAM(S): 50 INJECTION, SOLUTION INTRAVENOUS at 05:33

## 2025-01-01 RX ADMIN — INSULIN LISPRO 6: 100 INJECTION, SOLUTION INTRAVENOUS; SUBCUTANEOUS at 17:58

## 2025-01-01 RX ADMIN — IPRATROPIUM BROMIDE AND ALBUTEROL SULFATE 3 MILLILITER(S): .5; 2.5 SOLUTION RESPIRATORY (INHALATION) at 01:24

## 2025-01-01 RX ADMIN — Medication 250 MILLIGRAM(S): at 21:02

## 2025-01-01 RX ADMIN — BUMETANIDE 1 MILLIGRAM(S): 1 TABLET ORAL at 07:49

## 2025-01-01 RX ADMIN — MEROPENEM 100 MILLIGRAM(S): 1 INJECTION INTRAVENOUS at 13:21

## 2025-01-01 RX ADMIN — MEROPENEM 100 MILLIGRAM(S): 1 INJECTION INTRAVENOUS at 18:01

## 2025-01-01 RX ADMIN — APIXABAN 5 MILLIGRAM(S): 2.5 TABLET, FILM COATED ORAL at 05:06

## 2025-01-01 RX ADMIN — BUMETANIDE 1 MILLIGRAM(S): 1 TABLET ORAL at 14:15

## 2025-01-01 RX ADMIN — Medication 4.23 MICROGRAM(S)/KG/HR: at 19:03

## 2025-01-01 RX ADMIN — DEXMEDETOMIDINE HYDROCHLORIDE IN SODIUM CHLORIDE 1.04 MICROGRAM(S)/KG/HR: 4 INJECTION INTRAVENOUS at 05:23

## 2025-01-01 RX ADMIN — Medication 15 MILLILITER(S): at 05:21

## 2025-01-01 RX ADMIN — INSULIN LISPRO 4: 100 INJECTION, SOLUTION INTRAVENOUS; SUBCUTANEOUS at 17:40

## 2025-01-01 RX ADMIN — ATORVASTATIN CALCIUM 40 MILLIGRAM(S): 80 TABLET, FILM COATED ORAL at 21:05

## 2025-01-01 RX ADMIN — SODIUM ZIRCONIUM CYCLOSILICATE 10 GRAM(S): 5 POWDER, FOR SUSPENSION ORAL at 17:55

## 2025-01-01 RX ADMIN — INSULIN LISPRO 4: 100 INJECTION, SOLUTION INTRAVENOUS; SUBCUTANEOUS at 13:19

## 2025-01-01 RX ADMIN — Medication 650 MILLIGRAM(S): at 06:27

## 2025-01-01 RX ADMIN — INSULIN LISPRO 8 UNIT(S): 100 INJECTION, SOLUTION INTRAVENOUS; SUBCUTANEOUS at 22:36

## 2025-01-01 RX ADMIN — Medication 650 MILLIGRAM(S): at 04:15

## 2025-01-01 RX ADMIN — DEXMEDETOMIDINE HYDROCHLORIDE IN SODIUM CHLORIDE 1.04 MICROGRAM(S)/KG/HR: 4 INJECTION INTRAVENOUS at 19:02

## 2025-01-01 RX ADMIN — AMIODARONE HYDROCHLORIDE 200 MILLIGRAM(S): 50 INJECTION, SOLUTION INTRAVENOUS at 05:37

## 2025-01-01 RX ADMIN — IPRATROPIUM BROMIDE AND ALBUTEROL SULFATE 3 MILLILITER(S): .5; 2.5 SOLUTION RESPIRATORY (INHALATION) at 09:46

## 2025-01-01 RX ADMIN — Medication 15 MILLILITER(S): at 17:56

## 2025-01-01 RX ADMIN — Medication 4.17 MICROGRAM(S)/KG/HR: at 02:13

## 2025-01-01 RX ADMIN — BUMETANIDE 1 MILLIGRAM(S): 1 TABLET ORAL at 09:58

## 2025-01-01 RX ADMIN — Medication 15 MILLILITER(S): at 05:30

## 2025-01-01 RX ADMIN — Medication 4.17 MICROGRAM(S)/KG/HR: at 18:22

## 2025-01-01 RX ADMIN — BUMETANIDE 1 MILLIGRAM(S): 1 TABLET ORAL at 12:36

## 2025-01-01 RX ADMIN — APIXABAN 5 MILLIGRAM(S): 2.5 TABLET, FILM COATED ORAL at 18:02

## 2025-01-01 RX ADMIN — Medication 650 MILLIGRAM(S): at 06:13

## 2025-01-01 RX ADMIN — Medication 5 UNIT(S): at 18:33

## 2025-01-01 RX ADMIN — BUMETANIDE 1 MILLIGRAM(S): 1 TABLET ORAL at 06:23

## 2025-01-01 RX ADMIN — CLOPIDOGREL BISULFATE 75 MILLIGRAM(S): 75 TABLET, FILM COATED ORAL at 11:36

## 2025-01-01 RX ADMIN — Medication 4.17 MICROGRAM(S)/KG/HR: at 22:30

## 2025-01-01 RX ADMIN — Medication 650 MILLIGRAM(S): at 06:50

## 2025-01-01 RX ADMIN — DEXMEDETOMIDINE HYDROCHLORIDE IN SODIUM CHLORIDE 1.04 MICROGRAM(S)/KG/HR: 4 INJECTION INTRAVENOUS at 02:26

## 2025-01-01 RX ADMIN — DEXMEDETOMIDINE HYDROCHLORIDE IN SODIUM CHLORIDE 1.04 MICROGRAM(S)/KG/HR: 4 INJECTION INTRAVENOUS at 18:06

## 2025-01-01 RX ADMIN — Medication 40 MILLIGRAM(S): at 06:15

## 2025-01-01 RX ADMIN — APIXABAN 5 MILLIGRAM(S): 2.5 TABLET, FILM COATED ORAL at 05:23

## 2025-01-01 RX ADMIN — INSULIN LISPRO 4: 100 INJECTION, SOLUTION INTRAVENOUS; SUBCUTANEOUS at 12:33

## 2025-01-01 RX ADMIN — INSULIN LISPRO 8 UNIT(S): 100 INJECTION, SOLUTION INTRAVENOUS; SUBCUTANEOUS at 17:44

## 2025-01-01 RX ADMIN — Medication 1 MILLIGRAM(S): at 03:14

## 2025-01-01 RX ADMIN — Medication 25 MILLIGRAM(S): at 11:15

## 2025-01-01 RX ADMIN — SODIUM ZIRCONIUM CYCLOSILICATE 10 GRAM(S): 5 POWDER, FOR SUSPENSION ORAL at 11:35

## 2025-01-01 RX ADMIN — DEXMEDETOMIDINE HYDROCHLORIDE 4.11 MICROGRAM(S)/KG/HR: 4 INJECTION, SOLUTION INTRAVENOUS at 14:12

## 2025-01-01 RX ADMIN — MIDODRINE HYDROCHLORIDE 10 MILLIGRAM(S): 5 TABLET ORAL at 22:34

## 2025-01-01 RX ADMIN — CLOPIDOGREL BISULFATE 75 MILLIGRAM(S): 75 TABLET, FILM COATED ORAL at 11:31

## 2025-01-01 RX ADMIN — Medication 650 MILLIGRAM(S): at 05:15

## 2025-01-01 RX ADMIN — Medication 650 MILLIGRAM(S): at 14:24

## 2025-01-01 RX ADMIN — Medication 15 MILLILITER(S): at 18:04

## 2025-01-01 RX ADMIN — CEFEPIME 100 MILLIGRAM(S): 2 INJECTION, POWDER, FOR SOLUTION INTRAVENOUS at 05:58

## 2025-01-01 RX ADMIN — DEXMEDETOMIDINE HYDROCHLORIDE IN SODIUM CHLORIDE 1.04 MICROGRAM(S)/KG/HR: 4 INJECTION INTRAVENOUS at 23:26

## 2025-01-01 RX ADMIN — Medication 4.17 MICROGRAM(S)/KG/HR: at 22:53

## 2025-01-01 RX ADMIN — INSULIN LISPRO 4: 100 INJECTION, SOLUTION INTRAVENOUS; SUBCUTANEOUS at 18:41

## 2025-01-01 RX ADMIN — Medication 2 MILLIGRAM(S): at 13:10

## 2025-01-01 RX ADMIN — CEFTRIAXONE 100 MILLIGRAM(S): 500 INJECTION, POWDER, FOR SOLUTION INTRAMUSCULAR; INTRAVENOUS at 12:36

## 2025-01-01 RX ADMIN — Medication 15 MILLILITER(S): at 05:23

## 2025-01-01 RX ADMIN — APIXABAN 5 MILLIGRAM(S): 2.5 TABLET, FILM COATED ORAL at 17:17

## 2025-01-01 RX ADMIN — Medication 650 MILLIGRAM(S): at 06:08

## 2025-01-01 RX ADMIN — EZETIMIBE 10 MILLIGRAM(S): 10 TABLET ORAL at 12:22

## 2025-01-01 RX ADMIN — Medication 650 MILLIGRAM(S): at 18:30

## 2025-01-01 RX ADMIN — DEXMEDETOMIDINE HYDROCHLORIDE IN SODIUM CHLORIDE 1.04 MICROGRAM(S)/KG/HR: 4 INJECTION INTRAVENOUS at 22:20

## 2025-01-01 RX ADMIN — Medication 1 APPLICATION(S): at 05:19

## 2025-01-01 RX ADMIN — Medication 20 MILLIGRAM(S): at 12:22

## 2025-01-01 RX ADMIN — NOREPINEPHRINE BITARTRATE 7.7 MICROGRAM(S)/KG/MIN: 1 INJECTION INTRAVENOUS at 05:40

## 2025-01-01 RX ADMIN — INSULIN GLARGINE-YFGN 34 UNIT(S): 100 INJECTION, SOLUTION SUBCUTANEOUS at 22:21

## 2025-01-01 RX ADMIN — AMIODARONE HYDROCHLORIDE 200 MILLIGRAM(S): 50 INJECTION, SOLUTION INTRAVENOUS at 05:16

## 2025-01-01 RX ADMIN — Medication 650 MILLIGRAM(S): at 23:00

## 2025-01-01 RX ADMIN — IPRATROPIUM BROMIDE AND ALBUTEROL SULFATE 3 MILLILITER(S): .5; 2.5 SOLUTION RESPIRATORY (INHALATION) at 03:15

## 2025-01-01 RX ADMIN — Medication 650 MILLIGRAM(S): at 15:34

## 2025-01-01 RX ADMIN — Medication 166.67 MILLIGRAM(S): at 11:35

## 2025-01-01 RX ADMIN — INSULIN GLARGINE-YFGN 34 UNIT(S): 100 INJECTION, SOLUTION SUBCUTANEOUS at 13:02

## 2025-01-01 RX ADMIN — INSULIN LISPRO 4: 100 INJECTION, SOLUTION INTRAVENOUS; SUBCUTANEOUS at 06:32

## 2025-01-01 RX ADMIN — Medication 4.17 MICROGRAM(S)/KG/HR: at 09:11

## 2025-01-01 RX ADMIN — Medication 6 UNIT(S)/HR: at 11:19

## 2025-01-01 RX ADMIN — MEROPENEM 100 MILLIGRAM(S): 1 INJECTION INTRAVENOUS at 18:06

## 2025-01-01 RX ADMIN — PROPOFOL 2.54 MICROGRAM(S)/KG/MIN: 10 INJECTION, EMULSION INTRAVENOUS at 12:21

## 2025-01-01 RX ADMIN — Medication 4.17 MICROGRAM(S)/KG/HR: at 20:19

## 2025-01-01 RX ADMIN — BUMETANIDE 1 MILLIGRAM(S): 1 TABLET ORAL at 10:20

## 2025-01-01 RX ADMIN — Medication 166.67 MILLIGRAM(S): at 13:18

## 2025-01-01 RX ADMIN — ATORVASTATIN CALCIUM 40 MILLIGRAM(S): 80 TABLET, FILM COATED ORAL at 21:37

## 2025-01-01 RX ADMIN — MIDODRINE HYDROCHLORIDE 10 MILLIGRAM(S): 5 TABLET ORAL at 14:19

## 2025-01-01 RX ADMIN — MIDODRINE HYDROCHLORIDE 10 MILLIGRAM(S): 5 TABLET ORAL at 06:01

## 2025-01-01 RX ADMIN — Medication 15 MILLILITER(S): at 17:48

## 2025-01-01 RX ADMIN — SODIUM ZIRCONIUM CYCLOSILICATE 10 GRAM(S): 10 POWDER, FOR SUSPENSION ORAL at 16:02

## 2025-01-01 RX ADMIN — Medication 4.17 MICROGRAM(S)/KG/HR: at 20:30

## 2025-01-01 RX ADMIN — Medication 1 APPLICATION(S): at 05:39

## 2025-01-01 RX ADMIN — CHLORHEXIDINE GLUCONATE 15 MILLILITER(S): 1.2 RINSE ORAL at 05:13

## 2025-01-01 RX ADMIN — Medication 4.17 MICROGRAM(S)/KG/HR: at 19:38

## 2025-01-01 RX ADMIN — EZETIMIBE 10 MILLIGRAM(S): 10 TABLET ORAL at 11:32

## 2025-01-01 RX ADMIN — INSULIN LISPRO 2: 100 INJECTION, SOLUTION INTRAVENOUS; SUBCUTANEOUS at 17:50

## 2025-01-01 RX ADMIN — Medication 40 MILLIGRAM(S): at 05:24

## 2025-01-01 RX ADMIN — DEXMEDETOMIDINE HYDROCHLORIDE IN SODIUM CHLORIDE 1.04 MICROGRAM(S)/KG/HR: 4 INJECTION INTRAVENOUS at 11:12

## 2025-01-01 RX ADMIN — Medication 4.23 MICROGRAM(S)/KG/HR: at 22:21

## 2025-01-01 RX ADMIN — DEXMEDETOMIDINE HYDROCHLORIDE IN SODIUM CHLORIDE 1.04 MICROGRAM(S)/KG/HR: 4 INJECTION INTRAVENOUS at 14:03

## 2025-01-01 RX ADMIN — INSULIN GLARGINE-YFGN 17 UNIT(S): 100 INJECTION, SOLUTION SUBCUTANEOUS at 00:45

## 2025-01-01 RX ADMIN — Medication 650 MILLIGRAM(S): at 22:20

## 2025-01-01 RX ADMIN — AMIODARONE HYDROCHLORIDE 200 MILLIGRAM(S): 50 INJECTION, SOLUTION INTRAVENOUS at 05:22

## 2025-01-01 RX ADMIN — METOPROLOL SUCCINATE 25 MILLIGRAM(S): 50 TABLET, EXTENDED RELEASE ORAL at 17:49

## 2025-01-01 RX ADMIN — Medication 40 MILLIGRAM(S): at 05:49

## 2025-01-01 RX ADMIN — Medication 40 MILLIGRAM(S): at 17:32

## 2025-01-01 RX ADMIN — DEXMEDETOMIDINE HYDROCHLORIDE IN SODIUM CHLORIDE 1.04 MICROGRAM(S)/KG/HR: 4 INJECTION INTRAVENOUS at 08:11

## 2025-01-01 RX ADMIN — CEFEPIME 100 MILLIGRAM(S): 2 INJECTION, POWDER, FOR SOLUTION INTRAVENOUS at 17:40

## 2025-01-01 RX ADMIN — SODIUM CHLORIDE 40 MILLILITER(S): 9 INJECTION, SOLUTION INTRAVENOUS at 00:53

## 2025-01-01 RX ADMIN — IPRATROPIUM BROMIDE AND ALBUTEROL SULFATE 3 MILLILITER(S): .5; 2.5 SOLUTION RESPIRATORY (INHALATION) at 14:04

## 2025-01-01 RX ADMIN — Medication 4: at 09:00

## 2025-01-01 RX ADMIN — AMIODARONE HYDROCHLORIDE 200 MILLIGRAM(S): 50 INJECTION, SOLUTION INTRAVENOUS at 05:04

## 2025-01-01 RX ADMIN — DEXMEDETOMIDINE HYDROCHLORIDE IN SODIUM CHLORIDE 1.04 MICROGRAM(S)/KG/HR: 4 INJECTION INTRAVENOUS at 09:18

## 2025-01-01 RX ADMIN — Medication 40 MILLIGRAM(S): at 17:12

## 2025-01-01 RX ADMIN — BUMETANIDE 2 MILLIGRAM(S): 1 TABLET ORAL at 08:49

## 2025-01-01 RX ADMIN — INSULIN LISPRO 2: 100 INJECTION, SOLUTION INTRAVENOUS; SUBCUTANEOUS at 05:40

## 2025-01-01 RX ADMIN — ATORVASTATIN CALCIUM 40 MILLIGRAM(S): 80 TABLET, FILM COATED ORAL at 22:20

## 2025-01-01 RX ADMIN — INSULIN LISPRO 8 UNIT(S): 100 INJECTION, SOLUTION INTRAVENOUS; SUBCUTANEOUS at 05:39

## 2025-01-01 RX ADMIN — EZETIMIBE 10 MILLIGRAM(S): 10 TABLET ORAL at 11:07

## 2025-01-01 RX ADMIN — Medication 100 MILLIGRAM(S): at 05:15

## 2025-01-01 RX ADMIN — DEXMEDETOMIDINE HYDROCHLORIDE IN SODIUM CHLORIDE 1.04 MICROGRAM(S)/KG/HR: 4 INJECTION INTRAVENOUS at 12:25

## 2025-01-01 RX ADMIN — METOPROLOL SUCCINATE 50 MILLIGRAM(S): 50 TABLET, EXTENDED RELEASE ORAL at 05:06

## 2025-01-01 RX ADMIN — INSULIN LISPRO 8 UNIT(S): 100 INJECTION, SOLUTION INTRAVENOUS; SUBCUTANEOUS at 17:59

## 2025-01-01 RX ADMIN — DEXMEDETOMIDINE HYDROCHLORIDE IN SODIUM CHLORIDE 1.04 MICROGRAM(S)/KG/HR: 4 INJECTION INTRAVENOUS at 19:57

## 2025-01-01 RX ADMIN — Medication 15 MILLILITER(S): at 06:15

## 2025-01-01 RX ADMIN — INSULIN LISPRO 2: 100 INJECTION, SOLUTION INTRAVENOUS; SUBCUTANEOUS at 06:18

## 2025-01-01 RX ADMIN — DEXMEDETOMIDINE HYDROCHLORIDE IN SODIUM CHLORIDE 1.04 MICROGRAM(S)/KG/HR: 4 INJECTION INTRAVENOUS at 21:32

## 2025-01-01 RX ADMIN — BUMETANIDE 1 MILLIGRAM(S): 1 TABLET ORAL at 05:34

## 2025-01-01 RX ADMIN — FUROSEMIDE 40 MILLIGRAM(S): 10 INJECTION INTRAMUSCULAR; INTRAVENOUS at 05:18

## 2025-01-01 RX ADMIN — INSULIN LISPRO 8 UNIT(S): 100 INJECTION, SOLUTION INTRAVENOUS; SUBCUTANEOUS at 17:29

## 2025-01-01 RX ADMIN — ATORVASTATIN CALCIUM 40 MILLIGRAM(S): 80 TABLET, FILM COATED ORAL at 00:10

## 2025-01-01 RX ADMIN — APIXABAN 5 MILLIGRAM(S): 2.5 TABLET, FILM COATED ORAL at 05:04

## 2025-01-01 RX ADMIN — QUETIAPINE FUMARATE 25 MILLIGRAM(S): 25 TABLET ORAL at 22:03

## 2025-01-01 RX ADMIN — IPRATROPIUM BROMIDE AND ALBUTEROL SULFATE 3 MILLILITER(S): .5; 2.5 SOLUTION RESPIRATORY (INHALATION) at 08:14

## 2025-01-01 RX ADMIN — INSULIN LISPRO 8 UNIT(S): 100 INJECTION, SOLUTION INTRAVENOUS; SUBCUTANEOUS at 11:49

## 2025-01-01 RX ADMIN — APIXABAN 5 MILLIGRAM(S): 2.5 TABLET, FILM COATED ORAL at 05:50

## 2025-01-01 RX ADMIN — Medication 1: at 16:45

## 2025-01-01 RX ADMIN — Medication 650 MILLIGRAM(S): at 20:21

## 2025-01-01 RX ADMIN — AMIODARONE HYDROCHLORIDE 200 MILLIGRAM(S): 50 INJECTION, SOLUTION INTRAVENOUS at 06:00

## 2025-01-01 RX ADMIN — Medication 40 MILLIGRAM(S): at 17:51

## 2025-01-01 RX ADMIN — MIDODRINE HYDROCHLORIDE 10 MILLIGRAM(S): 5 TABLET ORAL at 05:33

## 2025-01-01 RX ADMIN — APIXABAN 5 MILLIGRAM(S): 2.5 TABLET, FILM COATED ORAL at 17:51

## 2025-01-01 RX ADMIN — Medication 15 MILLILITER(S): at 06:03

## 2025-01-01 RX ADMIN — Medication 650 MILLIGRAM(S): at 00:15

## 2025-01-01 RX ADMIN — Medication 2 TABLET(S): at 22:32

## 2025-01-01 RX ADMIN — INSULIN LISPRO 8 UNIT(S): 100 INJECTION, SOLUTION INTRAVENOUS; SUBCUTANEOUS at 06:14

## 2025-01-01 RX ADMIN — Medication 166.67 MILLIGRAM(S): at 12:57

## 2025-01-01 RX ADMIN — SACUBITRIL AND VALSARTAN 1 TABLET(S): 6; 6 PELLET ORAL at 05:06

## 2025-01-01 RX ADMIN — NOREPINEPHRINE BITARTRATE 7.92 MICROGRAM(S)/KG/MIN: 8 SOLUTION at 11:41

## 2025-01-01 RX ADMIN — CEFEPIME 100 MILLIGRAM(S): 2 INJECTION, POWDER, FOR SOLUTION INTRAVENOUS at 17:22

## 2025-01-01 RX ADMIN — INSULIN LISPRO 8 UNIT(S): 100 INJECTION, SOLUTION INTRAVENOUS; SUBCUTANEOUS at 16:58

## 2025-01-01 RX ADMIN — CLOPIDOGREL BISULFATE 75 MILLIGRAM(S): 75 TABLET, FILM COATED ORAL at 11:14

## 2025-01-01 RX ADMIN — APIXABAN 5 MILLIGRAM(S): 2.5 TABLET, FILM COATED ORAL at 05:37

## 2025-01-01 RX ADMIN — EZETIMIBE 10 MILLIGRAM(S): 10 TABLET ORAL at 12:16

## 2025-01-01 RX ADMIN — Medication 4.17 MICROGRAM(S)/KG/HR: at 19:47

## 2025-01-01 RX ADMIN — AMIODARONE HYDROCHLORIDE 200 MILLIGRAM(S): 50 INJECTION, SOLUTION INTRAVENOUS at 05:40

## 2025-01-01 RX ADMIN — FUROSEMIDE 40 MILLIGRAM(S): 10 INJECTION INTRAMUSCULAR; INTRAVENOUS at 23:48

## 2025-01-01 RX ADMIN — Medication 1 UNIT(S)/HR: at 18:13

## 2025-01-01 RX ADMIN — INSULIN LISPRO 4: 100 INJECTION, SOLUTION INTRAVENOUS; SUBCUTANEOUS at 05:07

## 2025-01-01 RX ADMIN — Medication 4.17 MICROGRAM(S)/KG/HR: at 22:46

## 2025-01-01 RX ADMIN — ATORVASTATIN CALCIUM 40 MILLIGRAM(S): 80 TABLET, FILM COATED ORAL at 22:34

## 2025-01-01 RX ADMIN — INSULIN LISPRO 8 UNIT(S): 100 INJECTION, SOLUTION INTRAVENOUS; SUBCUTANEOUS at 11:33

## 2025-01-01 RX ADMIN — Medication 1 APPLICATION(S): at 05:06

## 2025-01-01 RX ADMIN — Medication 2: at 12:00

## 2025-01-01 RX ADMIN — Medication 15 MILLILITER(S): at 05:50

## 2025-01-01 RX ADMIN — Medication 4.23 MICROGRAM(S)/KG/HR: at 12:21

## 2025-01-01 RX ADMIN — DEXMEDETOMIDINE HYDROCHLORIDE IN SODIUM CHLORIDE 1.04 MICROGRAM(S)/KG/HR: 4 INJECTION INTRAVENOUS at 06:28

## 2025-01-01 RX ADMIN — Medication 40 MILLIGRAM(S): at 05:16

## 2025-01-01 RX ADMIN — DEXMEDETOMIDINE HYDROCHLORIDE IN SODIUM CHLORIDE 1.04 MICROGRAM(S)/KG/HR: 4 INJECTION INTRAVENOUS at 14:20

## 2025-01-01 RX ADMIN — Medication 800 MILLIGRAM(S): at 22:48

## 2025-01-01 RX ADMIN — MUPIROCIN CALCIUM 1 APPLICATION(S): 20 CREAM TOPICAL at 05:43

## 2025-01-01 RX ADMIN — CHLORHEXIDINE GLUCONATE 1 APPLICATION(S): 1.2 RINSE ORAL at 05:15

## 2025-01-01 RX ADMIN — INSULIN LISPRO 8 UNIT(S): 100 INJECTION, SOLUTION INTRAVENOUS; SUBCUTANEOUS at 05:07

## 2025-01-01 RX ADMIN — FUROSEMIDE 40 MILLIGRAM(S): 10 INJECTION INTRAMUSCULAR; INTRAVENOUS at 07:22

## 2025-01-01 RX ADMIN — INSULIN GLARGINE-YFGN 34 UNIT(S): 100 INJECTION, SOLUTION SUBCUTANEOUS at 09:32

## 2025-01-01 RX ADMIN — IPRATROPIUM BROMIDE AND ALBUTEROL SULFATE 3 MILLILITER(S): .5; 2.5 SOLUTION RESPIRATORY (INHALATION) at 16:34

## 2025-01-01 RX ADMIN — Medication 40 MILLIGRAM(S): at 05:40

## 2025-01-01 RX ADMIN — INSULIN LISPRO 8 UNIT(S): 100 INJECTION, SOLUTION INTRAVENOUS; SUBCUTANEOUS at 12:44

## 2025-01-01 RX ADMIN — Medication 4.17 MICROGRAM(S)/KG/HR: at 01:23

## 2025-01-01 RX ADMIN — CEFEPIME 100 MILLIGRAM(S): 2 INJECTION, POWDER, FOR SOLUTION INTRAVENOUS at 17:56

## 2025-01-01 RX ADMIN — INSULIN LISPRO 2: 100 INJECTION, SOLUTION INTRAVENOUS; SUBCUTANEOUS at 18:10

## 2025-01-01 RX ADMIN — Medication 800 MILLIGRAM(S): at 23:00

## 2025-01-01 RX ADMIN — Medication 4.17 MICROGRAM(S)/KG/HR: at 05:49

## 2025-01-01 RX ADMIN — Medication 2 MILLIGRAM(S): at 11:08

## 2025-01-01 RX ADMIN — DEXMEDETOMIDINE HYDROCHLORIDE 4.11 MICROGRAM(S)/KG/HR: 4 INJECTION, SOLUTION INTRAVENOUS at 21:59

## 2025-01-01 RX ADMIN — Medication 4.17 MICROGRAM(S)/KG/HR: at 01:41

## 2025-01-01 RX ADMIN — MUPIROCIN CALCIUM 1 APPLICATION(S): 20 CREAM TOPICAL at 06:03

## 2025-01-01 RX ADMIN — Medication 15 MILLILITER(S): at 18:40

## 2025-01-01 RX ADMIN — DEXMEDETOMIDINE HYDROCHLORIDE IN SODIUM CHLORIDE 1.04 MICROGRAM(S)/KG/HR: 4 INJECTION INTRAVENOUS at 20:20

## 2025-01-01 RX ADMIN — Medication 650 MILLIGRAM(S): at 18:34

## 2025-01-01 RX ADMIN — AMIODARONE HYDROCHLORIDE 200 MILLIGRAM(S): 50 INJECTION, SOLUTION INTRAVENOUS at 06:22

## 2025-01-01 RX ADMIN — INSULIN GLARGINE-YFGN 34 UNIT(S): 100 INJECTION, SOLUTION SUBCUTANEOUS at 21:47

## 2025-01-01 RX ADMIN — EZETIMIBE 10 MILLIGRAM(S): 10 TABLET ORAL at 12:08

## 2025-01-01 RX ADMIN — DEXMEDETOMIDINE HYDROCHLORIDE IN SODIUM CHLORIDE 1.04 MICROGRAM(S)/KG/HR: 4 INJECTION INTRAVENOUS at 22:53

## 2025-01-01 RX ADMIN — MUPIROCIN CALCIUM 1 APPLICATION(S): 20 CREAM TOPICAL at 18:06

## 2025-01-01 RX ADMIN — INSULIN LISPRO 4: 100 INJECTION, SOLUTION INTRAVENOUS; SUBCUTANEOUS at 05:39

## 2025-01-01 RX ADMIN — INSULIN LISPRO 4: 100 INJECTION, SOLUTION INTRAVENOUS; SUBCUTANEOUS at 01:47

## 2025-01-01 RX ADMIN — BUMETANIDE 1 MILLIGRAM(S): 1 TABLET ORAL at 18:02

## 2025-01-01 RX ADMIN — APIXABAN 5 MILLIGRAM(S): 2.5 TABLET, FILM COATED ORAL at 17:32

## 2025-01-01 RX ADMIN — INSULIN LISPRO 6: 100 INJECTION, SOLUTION INTRAVENOUS; SUBCUTANEOUS at 23:04

## 2025-01-01 RX ADMIN — INSULIN LISPRO 8 UNIT(S): 100 INJECTION, SOLUTION INTRAVENOUS; SUBCUTANEOUS at 23:05

## 2025-01-01 RX ADMIN — Medication 40 MILLIGRAM(S): at 17:17

## 2025-01-01 RX ADMIN — Medication 1 APPLICATION(S): at 05:10

## 2025-01-01 RX ADMIN — APIXABAN 5 MILLIGRAM(S): 2.5 TABLET, FILM COATED ORAL at 17:53

## 2025-01-01 RX ADMIN — Medication 650 MILLIGRAM(S): at 06:10

## 2025-01-01 RX ADMIN — DEXMEDETOMIDINE HYDROCHLORIDE IN SODIUM CHLORIDE 1.04 MICROGRAM(S)/KG/HR: 4 INJECTION INTRAVENOUS at 17:54

## 2025-01-01 RX ADMIN — BUMETANIDE 1 MILLIGRAM(S): 1 TABLET ORAL at 05:20

## 2025-01-01 RX ADMIN — DEXMEDETOMIDINE HYDROCHLORIDE IN SODIUM CHLORIDE 1.04 MICROGRAM(S)/KG/HR: 4 INJECTION INTRAVENOUS at 03:00

## 2025-01-01 RX ADMIN — IPRATROPIUM BROMIDE AND ALBUTEROL SULFATE 3 MILLILITER(S): .5; 2.5 SOLUTION RESPIRATORY (INHALATION) at 19:42

## 2025-01-01 RX ADMIN — Medication 15 MILLILITER(S): at 17:00

## 2025-01-01 RX ADMIN — MIDODRINE HYDROCHLORIDE 10 MILLIGRAM(S): 5 TABLET ORAL at 22:03

## 2025-01-01 RX ADMIN — Medication 650 MILLIGRAM(S): at 18:17

## 2025-01-01 RX ADMIN — MEROPENEM 100 MILLIGRAM(S): 1 INJECTION INTRAVENOUS at 05:17

## 2025-01-01 RX ADMIN — BUMETANIDE 1 MILLIGRAM(S): 1 TABLET ORAL at 05:32

## 2025-01-01 RX ADMIN — METOPROLOL SUCCINATE 25 MILLIGRAM(S): 50 TABLET, EXTENDED RELEASE ORAL at 05:16

## 2025-01-01 RX ADMIN — Medication 1 APPLICATION(S): at 06:04

## 2025-01-01 RX ADMIN — DEXMEDETOMIDINE HYDROCHLORIDE IN SODIUM CHLORIDE 1.04 MICROGRAM(S)/KG/HR: 4 INJECTION INTRAVENOUS at 22:10

## 2025-01-01 RX ADMIN — Medication 40 MILLIGRAM(S): at 05:22

## 2025-01-01 RX ADMIN — ATORVASTATIN CALCIUM 40 MILLIGRAM(S): 80 TABLET, FILM COATED ORAL at 21:24

## 2025-01-01 RX ADMIN — Medication 20 MILLIGRAM(S): at 13:03

## 2025-01-01 RX ADMIN — METOPROLOL SUCCINATE 25 MILLIGRAM(S): 50 TABLET, EXTENDED RELEASE ORAL at 05:09

## 2025-01-01 RX ADMIN — Medication 650 MILLIGRAM(S): at 01:00

## 2025-01-01 RX ADMIN — GLYCOPYRROLATE 0.2 MILLIGRAM(S): 0.2 INJECTION INTRAMUSCULAR; INTRAVENOUS at 22:42

## 2025-01-01 RX ADMIN — NOREPINEPHRINE BITARTRATE 7.92 MICROGRAM(S)/KG/MIN: 8 SOLUTION at 03:10

## 2025-01-01 RX ADMIN — CEFEPIME 100 MILLIGRAM(S): 2 INJECTION, POWDER, FOR SOLUTION INTRAVENOUS at 05:21

## 2025-01-01 RX ADMIN — IPRATROPIUM BROMIDE AND ALBUTEROL SULFATE 3 MILLILITER(S): .5; 2.5 SOLUTION RESPIRATORY (INHALATION) at 20:30

## 2025-01-01 RX ADMIN — DEXMEDETOMIDINE HYDROCHLORIDE IN SODIUM CHLORIDE 1.04 MICROGRAM(S)/KG/HR: 4 INJECTION INTRAVENOUS at 06:06

## 2025-01-01 RX ADMIN — Medication 15 MILLILITER(S): at 17:42

## 2025-01-01 RX ADMIN — DEXMEDETOMIDINE HYDROCHLORIDE IN SODIUM CHLORIDE 1.04 MICROGRAM(S)/KG/HR: 4 INJECTION INTRAVENOUS at 00:09

## 2025-01-01 RX ADMIN — INSULIN GLARGINE-YFGN 34 UNIT(S): 100 INJECTION, SOLUTION SUBCUTANEOUS at 22:36

## 2025-01-01 RX ADMIN — CLOPIDOGREL BISULFATE 75 MILLIGRAM(S): 75 TABLET, FILM COATED ORAL at 12:22

## 2025-01-01 RX ADMIN — Medication 15 MILLILITER(S): at 06:23

## 2025-01-01 RX ADMIN — Medication 40 MILLIGRAM(S): at 05:23

## 2025-01-01 RX ADMIN — EZETIMIBE 10 MILLIGRAM(S): 10 TABLET ORAL at 11:14

## 2025-01-01 RX ADMIN — Medication 650 MILLIGRAM(S): at 15:02

## 2025-01-01 RX ADMIN — METOPROLOL SUCCINATE 25 MILLIGRAM(S): 50 TABLET, EXTENDED RELEASE ORAL at 05:40

## 2025-01-01 RX ADMIN — IPRATROPIUM BROMIDE AND ALBUTEROL SULFATE 3 MILLILITER(S): .5; 2.5 SOLUTION RESPIRATORY (INHALATION) at 14:25

## 2025-01-01 RX ADMIN — Medication 4.23 MICROGRAM(S)/KG/HR: at 13:02

## 2025-01-01 RX ADMIN — NOREPINEPHRINE BITARTRATE 7.7 MICROGRAM(S)/KG/MIN: 1 INJECTION INTRAVENOUS at 22:36

## 2025-01-01 RX ADMIN — ATORVASTATIN CALCIUM 40 MILLIGRAM(S): 80 TABLET, FILM COATED ORAL at 22:57

## 2025-01-01 RX ADMIN — INSULIN LISPRO 6: 100 INJECTION, SOLUTION INTRAVENOUS; SUBCUTANEOUS at 06:46

## 2025-01-01 RX ADMIN — Medication 15 MILLILITER(S): at 05:41

## 2025-01-01 RX ADMIN — Medication 40 MILLIGRAM(S): at 06:22

## 2025-01-01 RX ADMIN — DEXMEDETOMIDINE HYDROCHLORIDE IN SODIUM CHLORIDE 1.04 MICROGRAM(S)/KG/HR: 4 INJECTION INTRAVENOUS at 13:54

## 2025-01-01 RX ADMIN — Medication 4.17 MICROGRAM(S)/KG/HR: at 05:37

## 2025-01-01 RX ADMIN — INSULIN LISPRO 8 UNIT(S): 100 INJECTION, SOLUTION INTRAVENOUS; SUBCUTANEOUS at 05:40

## 2025-01-01 RX ADMIN — BUMETANIDE 2 MILLIGRAM(S): 2 TABLET ORAL at 19:50

## 2025-01-01 RX ADMIN — METOPROLOL SUCCINATE 25 MILLIGRAM(S): 50 TABLET, EXTENDED RELEASE ORAL at 06:16

## 2025-01-01 RX ADMIN — Medication 25 MICROGRAM(S): at 15:10

## 2025-01-01 RX ADMIN — Medication 4.17 MICROGRAM(S)/KG/HR: at 21:05

## 2025-01-01 RX ADMIN — INSULIN LISPRO 8 UNIT(S): 100 INJECTION, SOLUTION INTRAVENOUS; SUBCUTANEOUS at 17:11

## 2025-01-01 RX ADMIN — INSULIN LISPRO 8 UNIT(S): 100 INJECTION, SOLUTION INTRAVENOUS; SUBCUTANEOUS at 06:46

## 2025-01-01 RX ADMIN — INSULIN LISPRO 4: 100 INJECTION, SOLUTION INTRAVENOUS; SUBCUTANEOUS at 11:07

## 2025-01-01 RX ADMIN — ATORVASTATIN CALCIUM 40 MILLIGRAM(S): 80 TABLET, FILM COATED ORAL at 22:32

## 2025-01-01 RX ADMIN — INSULIN GLARGINE-YFGN 34 UNIT(S): 100 INJECTION, SOLUTION SUBCUTANEOUS at 22:56

## 2025-01-01 RX ADMIN — Medication 650 MILLIGRAM(S): at 19:00

## 2025-01-01 RX ADMIN — INSULIN LISPRO 6: 100 INJECTION, SOLUTION INTRAVENOUS; SUBCUTANEOUS at 05:08

## 2025-01-01 RX ADMIN — EZETIMIBE 10 MILLIGRAM(S): 10 TABLET ORAL at 11:36

## 2025-01-01 RX ADMIN — METOPROLOL SUCCINATE 25 MILLIGRAM(S): 50 TABLET, EXTENDED RELEASE ORAL at 17:31

## 2025-01-01 RX ADMIN — INSULIN LISPRO 8 UNIT(S): 100 INJECTION, SOLUTION INTRAVENOUS; SUBCUTANEOUS at 05:47

## 2025-01-01 RX ADMIN — Medication 2 TABLET(S): at 21:17

## 2025-01-01 RX ADMIN — Medication 1 APPLICATION(S): at 05:51

## 2025-01-01 RX ADMIN — Medication 10 MILLIGRAM(S): at 03:55

## 2025-01-01 RX ADMIN — INSULIN LISPRO 8 UNIT(S): 100 INJECTION, SOLUTION INTRAVENOUS; SUBCUTANEOUS at 17:50

## 2025-01-01 RX ADMIN — SODIUM ZIRCONIUM CYCLOSILICATE 10 GRAM(S): 5 POWDER, FOR SUSPENSION ORAL at 12:22

## 2025-01-01 RX ADMIN — METOPROLOL SUCCINATE 25 MILLIGRAM(S): 50 TABLET, EXTENDED RELEASE ORAL at 17:33

## 2025-01-01 RX ADMIN — Medication 650 MILLIGRAM(S): at 15:17

## 2025-01-01 RX ADMIN — Medication 1000 MILLIGRAM(S): at 12:39

## 2025-01-01 RX ADMIN — Medication 15 MILLILITER(S): at 17:29

## 2025-01-01 RX ADMIN — Medication 40 MILLIGRAM(S): at 05:37

## 2025-01-01 RX ADMIN — DEXMEDETOMIDINE HYDROCHLORIDE 4.11 MICROGRAM(S)/KG/HR: 4 INJECTION, SOLUTION INTRAVENOUS at 01:41

## 2025-01-01 RX ADMIN — METOPROLOL SUCCINATE 25 MILLIGRAM(S): 50 TABLET, EXTENDED RELEASE ORAL at 06:04

## 2025-01-01 RX ADMIN — DEXMEDETOMIDINE HYDROCHLORIDE IN SODIUM CHLORIDE 1.04 MICROGRAM(S)/KG/HR: 4 INJECTION INTRAVENOUS at 20:08

## 2025-01-01 RX ADMIN — APIXABAN 5 MILLIGRAM(S): 2.5 TABLET, FILM COATED ORAL at 18:41

## 2025-01-01 RX ADMIN — Medication 650 MILLIGRAM(S): at 22:00

## 2025-01-01 RX ADMIN — METOPROLOL SUCCINATE 25 MILLIGRAM(S): 50 TABLET, EXTENDED RELEASE ORAL at 17:51

## 2025-01-01 RX ADMIN — CEFEPIME 100 MILLIGRAM(S): 2 INJECTION, POWDER, FOR SOLUTION INTRAVENOUS at 07:49

## 2025-01-01 RX ADMIN — Medication 150 MILLIGRAM(S): at 18:57

## 2025-01-01 RX ADMIN — APIXABAN 5 MILLIGRAM(S): 2.5 TABLET, FILM COATED ORAL at 06:22

## 2025-01-01 RX ADMIN — INSULIN LISPRO 8 UNIT(S): 100 INJECTION, SOLUTION INTRAVENOUS; SUBCUTANEOUS at 01:48

## 2025-01-01 RX ADMIN — INSULIN LISPRO 4: 100 INJECTION, SOLUTION INTRAVENOUS; SUBCUTANEOUS at 06:13

## 2025-01-01 RX ADMIN — Medication 8 UNIT(S)/HR: at 14:13

## 2025-01-01 RX ADMIN — BUMETANIDE 1 MILLIGRAM(S): 1 TABLET ORAL at 10:10

## 2025-01-01 RX ADMIN — EZETIMIBE 10 MILLIGRAM(S): 10 TABLET ORAL at 13:03

## 2025-01-01 RX ADMIN — Medication 15 MILLILITER(S): at 17:16

## 2025-01-01 RX ADMIN — INSULIN LISPRO 8 UNIT(S): 100 INJECTION, SOLUTION INTRAVENOUS; SUBCUTANEOUS at 18:03

## 2025-01-01 RX ADMIN — Medication 650 MILLIGRAM(S): at 21:00

## 2025-01-01 RX ADMIN — INSULIN LISPRO 4: 100 INJECTION, SOLUTION INTRAVENOUS; SUBCUTANEOUS at 05:47

## 2025-01-01 RX ADMIN — APIXABAN 5 MILLIGRAM(S): 2.5 TABLET, FILM COATED ORAL at 06:01

## 2025-01-01 RX ADMIN — VANCOMYCIN HYDROCHLORIDE 166.67 MILLIGRAM(S): 5 INJECTION, POWDER, LYOPHILIZED, FOR SOLUTION INTRAVENOUS at 05:14

## 2025-01-01 RX ADMIN — MEROPENEM 100 MILLIGRAM(S): 1 INJECTION INTRAVENOUS at 17:53

## 2025-01-01 RX ADMIN — DEXMEDETOMIDINE HYDROCHLORIDE IN SODIUM CHLORIDE 1.04 MICROGRAM(S)/KG/HR: 4 INJECTION INTRAVENOUS at 05:58

## 2025-01-01 RX ADMIN — Medication 2 TABLET(S): at 21:06

## 2025-01-01 RX ADMIN — Medication 650 MILLIGRAM(S): at 05:20

## 2025-01-01 RX ADMIN — Medication 4.17 MICROGRAM(S)/KG/HR: at 23:29

## 2025-01-01 RX ADMIN — METOPROLOL SUCCINATE 25 MILLIGRAM(S): 50 TABLET, EXTENDED RELEASE ORAL at 06:22

## 2025-01-01 RX ADMIN — Medication 250 MILLIGRAM(S): at 17:25

## 2025-01-01 RX ADMIN — Medication 4.17 MICROGRAM(S)/KG/HR: at 17:32

## 2025-01-01 RX ADMIN — DEXMEDETOMIDINE HYDROCHLORIDE IN SODIUM CHLORIDE 1.04 MICROGRAM(S)/KG/HR: 4 INJECTION INTRAVENOUS at 03:24

## 2025-01-01 RX ADMIN — Medication 650 MILLIGRAM(S): at 08:10

## 2025-01-01 RX ADMIN — Medication 650 MILLIGRAM(S): at 23:27

## 2025-01-01 RX ADMIN — INSULIN GLARGINE-YFGN 34 UNIT(S): 100 INJECTION, SOLUTION SUBCUTANEOUS at 09:07

## 2025-01-01 RX ADMIN — SODIUM ZIRCONIUM CYCLOSILICATE 10 GRAM(S): 10 POWDER, FOR SUSPENSION ORAL at 09:49

## 2025-01-01 RX ADMIN — DEXMEDETOMIDINE HYDROCHLORIDE IN SODIUM CHLORIDE 1.04 MICROGRAM(S)/KG/HR: 4 INJECTION INTRAVENOUS at 07:26

## 2025-01-01 RX ADMIN — Medication 15 MILLILITER(S): at 17:32

## 2025-01-01 RX ADMIN — CEFEPIME 100 MILLIGRAM(S): 2 INJECTION, POWDER, FOR SOLUTION INTRAVENOUS at 06:06

## 2025-01-01 RX ADMIN — QUETIAPINE FUMARATE 25 MILLIGRAM(S): 25 TABLET ORAL at 21:34

## 2025-01-01 RX ADMIN — INSULIN LISPRO 4: 100 INJECTION, SOLUTION INTRAVENOUS; SUBCUTANEOUS at 18:03

## 2025-01-01 RX ADMIN — Medication 650 MILLIGRAM(S): at 15:55

## 2025-01-01 RX ADMIN — AMIODARONE HYDROCHLORIDE 200 MILLIGRAM(S): 50 INJECTION, SOLUTION INTRAVENOUS at 06:15

## 2025-01-01 RX ADMIN — Medication 650 MILLIGRAM(S): at 00:37

## 2025-01-01 RX ADMIN — DEXMEDETOMIDINE HYDROCHLORIDE IN SODIUM CHLORIDE 1.04 MICROGRAM(S)/KG/HR: 4 INJECTION INTRAVENOUS at 03:43

## 2025-01-01 RX ADMIN — BUMETANIDE 1 MILLIGRAM(S): 1 TABLET ORAL at 13:06

## 2025-01-01 RX ADMIN — DEXMEDETOMIDINE HYDROCHLORIDE IN SODIUM CHLORIDE 1.04 MICROGRAM(S)/KG/HR: 4 INJECTION INTRAVENOUS at 07:49

## 2025-01-01 RX ADMIN — NOREPINEPHRINE BITARTRATE 7.92 MICROGRAM(S)/KG/MIN: 8 SOLUTION at 19:58

## 2025-01-01 RX ADMIN — Medication 250 MILLIGRAM(S): at 20:45

## 2025-01-01 RX ADMIN — APIXABAN 5 MILLIGRAM(S): 2.5 TABLET, FILM COATED ORAL at 17:00

## 2025-01-01 RX ADMIN — INSULIN LISPRO 8: 100 INJECTION, SOLUTION INTRAVENOUS; SUBCUTANEOUS at 06:02

## 2025-01-01 RX ADMIN — Medication 4.17 MICROGRAM(S)/KG/HR: at 20:13

## 2025-01-01 RX ADMIN — DEXMEDETOMIDINE HYDROCHLORIDE IN SODIUM CHLORIDE 1.04 MICROGRAM(S)/KG/HR: 4 INJECTION INTRAVENOUS at 18:47

## 2025-01-01 RX ADMIN — INSULIN LISPRO 12: 100 INJECTION, SOLUTION INTRAVENOUS; SUBCUTANEOUS at 08:24

## 2025-01-01 RX ADMIN — ATORVASTATIN CALCIUM 40 MILLIGRAM(S): 80 TABLET, FILM COATED ORAL at 21:34

## 2025-01-01 RX ADMIN — INSULIN LISPRO 8 UNIT(S): 100 INJECTION, SOLUTION INTRAVENOUS; SUBCUTANEOUS at 06:24

## 2025-01-01 RX ADMIN — PROPOFOL 10 MILLIGRAM(S): 10 INJECTION, EMULSION INTRAVENOUS at 14:59

## 2025-01-01 RX ADMIN — METOPROLOL SUCCINATE 25 MILLIGRAM(S): 50 TABLET, EXTENDED RELEASE ORAL at 05:50

## 2025-01-01 RX ADMIN — INSULIN LISPRO 8 UNIT(S): 100 INJECTION, SOLUTION INTRAVENOUS; SUBCUTANEOUS at 23:52

## 2025-01-01 RX ADMIN — INSULIN LISPRO 2: 100 INJECTION, SOLUTION INTRAVENOUS; SUBCUTANEOUS at 11:13

## 2025-01-01 RX ADMIN — Medication 1 APPLICATION(S): at 05:21

## 2025-01-01 RX ADMIN — Medication 15 MILLILITER(S): at 17:17

## 2025-01-01 RX ADMIN — INSULIN LISPRO 8 UNIT(S): 100 INJECTION, SOLUTION INTRAVENOUS; SUBCUTANEOUS at 01:47

## 2025-01-01 RX ADMIN — MUPIROCIN CALCIUM 1 APPLICATION(S): 20 CREAM TOPICAL at 17:17

## 2025-01-01 RX ADMIN — Medication 2 TABLET(S): at 21:37

## 2025-01-01 RX ADMIN — INSULIN LISPRO 8 UNIT(S): 100 INJECTION, SOLUTION INTRAVENOUS; SUBCUTANEOUS at 12:23

## 2025-01-01 RX ADMIN — NOREPINEPHRINE BITARTRATE 7.92 MICROGRAM(S)/KG/MIN: 8 SOLUTION at 19:07

## 2025-01-01 RX ADMIN — ATORVASTATIN CALCIUM 40 MILLIGRAM(S): 80 TABLET, FILM COATED ORAL at 21:17

## 2025-01-01 RX ADMIN — CEFEPIME 100 MILLIGRAM(S): 2 INJECTION, POWDER, FOR SOLUTION INTRAVENOUS at 18:54

## 2025-01-01 RX ADMIN — FUROSEMIDE 40 MILLIGRAM(S): 10 INJECTION INTRAMUSCULAR; INTRAVENOUS at 05:07

## 2025-01-01 RX ADMIN — Medication 15 MILLILITER(S): at 05:55

## 2025-01-01 RX ADMIN — Medication 15 MILLILITER(S): at 17:12

## 2025-01-01 RX ADMIN — ATORVASTATIN CALCIUM 40 MILLIGRAM(S): 80 TABLET, FILM COATED ORAL at 22:13

## 2025-01-01 RX ADMIN — INSULIN LISPRO 6: 100 INJECTION, SOLUTION INTRAVENOUS; SUBCUTANEOUS at 17:45

## 2025-01-01 RX ADMIN — INSULIN LISPRO 8 UNIT(S): 100 INJECTION, SOLUTION INTRAVENOUS; SUBCUTANEOUS at 22:38

## 2025-01-01 RX ADMIN — Medication 25 MICROGRAM(S): at 14:23

## 2025-01-01 RX ADMIN — Medication 650 MILLIGRAM(S): at 06:00

## 2025-01-01 RX ADMIN — INSULIN LISPRO 8: 100 INJECTION, SOLUTION INTRAVENOUS; SUBCUTANEOUS at 17:17

## 2025-01-01 RX ADMIN — APIXABAN 5 MILLIGRAM(S): 2.5 TABLET, FILM COATED ORAL at 05:33

## 2025-01-01 RX ADMIN — INSULIN GLARGINE-YFGN 34 UNIT(S): 100 INJECTION, SOLUTION SUBCUTANEOUS at 22:37

## 2025-01-01 RX ADMIN — ATORVASTATIN CALCIUM 40 MILLIGRAM(S): 80 TABLET, FILM COATED ORAL at 22:10

## 2025-01-01 RX ADMIN — INSULIN LISPRO 4: 100 INJECTION, SOLUTION INTRAVENOUS; SUBCUTANEOUS at 17:29

## 2025-01-01 RX ADMIN — Medication 40 MILLIGRAM(S): at 17:29

## 2025-01-01 RX ADMIN — BUMETANIDE 1 MILLIGRAM(S): 1 TABLET ORAL at 17:21

## 2025-01-01 RX ADMIN — Medication 4.23 MICROGRAM(S)/KG/HR: at 22:31

## 2025-01-01 RX ADMIN — METOPROLOL SUCCINATE 25 MILLIGRAM(S): 50 TABLET, EXTENDED RELEASE ORAL at 17:17

## 2025-01-01 RX ADMIN — MUPIROCIN CALCIUM 1 APPLICATION(S): 20 CREAM TOPICAL at 17:56

## 2025-01-01 RX ADMIN — ATORVASTATIN CALCIUM 40 MILLIGRAM(S): 80 TABLET, FILM COATED ORAL at 22:08

## 2025-01-01 RX ADMIN — FENOFIBRATE 145 MILLIGRAM(S): 48 TABLET ORAL at 11:15

## 2025-01-01 RX ADMIN — Medication 650 MILLIGRAM(S): at 22:08

## 2025-01-01 RX ADMIN — INSULIN LISPRO 4: 100 INJECTION, SOLUTION INTRAVENOUS; SUBCUTANEOUS at 12:26

## 2025-01-01 RX ADMIN — INSULIN LISPRO 8 UNIT(S): 100 INJECTION, SOLUTION INTRAVENOUS; SUBCUTANEOUS at 18:02

## 2025-01-01 RX ADMIN — MEROPENEM 100 MILLIGRAM(S): 1 INJECTION INTRAVENOUS at 05:41

## 2025-01-01 RX ADMIN — EZETIMIBE 10 MILLIGRAM(S): 10 TABLET ORAL at 11:50

## 2025-01-01 RX ADMIN — Medication 4.17 MICROGRAM(S)/KG/HR: at 20:48

## 2025-01-01 RX ADMIN — Medication 15 MILLILITER(S): at 05:42

## 2025-01-01 RX ADMIN — Medication 4.23 MICROGRAM(S)/KG/HR: at 05:21

## 2025-01-01 RX ADMIN — INSULIN LISPRO 4: 100 INJECTION, SOLUTION INTRAVENOUS; SUBCUTANEOUS at 06:20

## 2025-01-01 RX ADMIN — Medication 650 MILLIGRAM(S): at 03:54

## 2025-01-01 RX ADMIN — INSULIN LISPRO 2: 100 INJECTION, SOLUTION INTRAVENOUS; SUBCUTANEOUS at 22:38

## 2025-01-01 RX ADMIN — CLOPIDOGREL BISULFATE 75 MILLIGRAM(S): 75 TABLET, FILM COATED ORAL at 12:16

## 2025-01-01 RX ADMIN — Medication 40 MILLIGRAM(S): at 05:55

## 2025-01-01 RX ADMIN — MIDODRINE HYDROCHLORIDE 10 MILLIGRAM(S): 5 TABLET ORAL at 22:12

## 2025-01-01 RX ADMIN — INSULIN LISPRO 2: 100 INJECTION, SOLUTION INTRAVENOUS; SUBCUTANEOUS at 00:05

## 2025-01-01 RX ADMIN — MUPIROCIN CALCIUM 1 APPLICATION(S): 20 CREAM TOPICAL at 05:35

## 2025-01-01 RX ADMIN — Medication 1 APPLICATION(S): at 05:34

## 2025-01-01 RX ADMIN — CHLORHEXIDINE GLUCONATE 15 MILLILITER(S): 1.2 RINSE ORAL at 18:56

## 2025-01-01 RX ADMIN — AMIODARONE HYDROCHLORIDE 200 MILLIGRAM(S): 50 INJECTION, SOLUTION INTRAVENOUS at 05:06

## 2025-01-01 RX ADMIN — IPRATROPIUM BROMIDE AND ALBUTEROL SULFATE 3 MILLILITER(S): .5; 2.5 SOLUTION RESPIRATORY (INHALATION) at 05:35

## 2025-01-01 RX ADMIN — INSULIN LISPRO 4: 100 INJECTION, SOLUTION INTRAVENOUS; SUBCUTANEOUS at 23:51

## 2025-01-01 RX ADMIN — CEFEPIME 100 MILLIGRAM(S): 2 INJECTION, POWDER, FOR SOLUTION INTRAVENOUS at 05:17

## 2025-01-01 RX ADMIN — INSULIN LISPRO 8 UNIT(S): 100 INJECTION, SOLUTION INTRAVENOUS; SUBCUTANEOUS at 23:46

## 2025-01-01 RX ADMIN — DEXMEDETOMIDINE HYDROCHLORIDE IN SODIUM CHLORIDE 1.04 MICROGRAM(S)/KG/HR: 4 INJECTION INTRAVENOUS at 17:55

## 2025-01-01 RX ADMIN — INSULIN LISPRO 6: 100 INJECTION, SOLUTION INTRAVENOUS; SUBCUTANEOUS at 17:30

## 2025-01-01 RX ADMIN — DEXMEDETOMIDINE HYDROCHLORIDE IN SODIUM CHLORIDE 1.04 MICROGRAM(S)/KG/HR: 4 INJECTION INTRAVENOUS at 19:21

## 2025-01-01 RX ADMIN — Medication 4.17 MICROGRAM(S)/KG/HR: at 21:37

## 2025-01-01 RX ADMIN — INSULIN LISPRO 4: 100 INJECTION, SOLUTION INTRAVENOUS; SUBCUTANEOUS at 23:45

## 2025-01-01 RX ADMIN — Medication 1 MILLIGRAM(S): at 04:11

## 2025-01-01 RX ADMIN — INSULIN LISPRO 4: 100 INJECTION, SOLUTION INTRAVENOUS; SUBCUTANEOUS at 23:27

## 2025-01-01 RX ADMIN — Medication 1 APPLICATION(S): at 05:30

## 2025-01-01 RX ADMIN — SODIUM ZIRCONIUM CYCLOSILICATE 10 GRAM(S): 5 POWDER, FOR SUSPENSION ORAL at 11:52

## 2025-01-01 RX ADMIN — Medication 650 MILLIGRAM(S): at 08:40

## 2025-01-01 RX ADMIN — INSULIN LISPRO 6: 100 INJECTION, SOLUTION INTRAVENOUS; SUBCUTANEOUS at 06:30

## 2025-01-01 RX ADMIN — CLOPIDOGREL BISULFATE 75 MILLIGRAM(S): 75 TABLET, FILM COATED ORAL at 12:07

## 2025-01-01 RX ADMIN — Medication 40 MILLIGRAM(S): at 05:30

## 2025-01-01 RX ADMIN — INSULIN LISPRO 2: 100 INJECTION, SOLUTION INTRAVENOUS; SUBCUTANEOUS at 11:32

## 2025-01-01 RX ADMIN — FENTANYL 4.11 MICROGRAM(S)/KG/HR: 75 PATCH, EXTENDED RELEASE TRANSDERMAL at 01:41

## 2025-01-01 RX ADMIN — IPRATROPIUM BROMIDE AND ALBUTEROL SULFATE 3 MILLILITER(S): .5; 2.5 SOLUTION RESPIRATORY (INHALATION) at 09:04

## 2025-01-01 RX ADMIN — MUPIROCIN CALCIUM 1 APPLICATION(S): 20 CREAM TOPICAL at 05:27

## 2025-01-01 RX ADMIN — DEXMEDETOMIDINE HYDROCHLORIDE IN SODIUM CHLORIDE 1.04 MICROGRAM(S)/KG/HR: 4 INJECTION INTRAVENOUS at 17:19

## 2025-01-01 RX ADMIN — METOPROLOL SUCCINATE 25 MILLIGRAM(S): 50 TABLET, EXTENDED RELEASE ORAL at 17:12

## 2025-01-01 RX ADMIN — INSULIN LISPRO 8 UNIT(S): 100 INJECTION, SOLUTION INTRAVENOUS; SUBCUTANEOUS at 07:17

## 2025-01-01 RX ADMIN — IPRATROPIUM BROMIDE AND ALBUTEROL SULFATE 3 MILLILITER(S): .5; 2.5 SOLUTION RESPIRATORY (INHALATION) at 20:16

## 2025-01-01 RX ADMIN — Medication 250 MILLIGRAM(S): at 12:36

## 2025-01-01 RX ADMIN — BUMETANIDE 1 MILLIGRAM(S): 1 TABLET ORAL at 05:16

## 2025-01-01 RX ADMIN — APIXABAN 5 MILLIGRAM(S): 2.5 TABLET, FILM COATED ORAL at 05:18

## 2025-01-01 RX ADMIN — APIXABAN 5 MILLIGRAM(S): 2.5 TABLET, FILM COATED ORAL at 17:12

## 2025-01-01 RX ADMIN — Medication 650 MILLIGRAM(S): at 21:17

## 2025-01-01 RX ADMIN — INSULIN LISPRO 8 UNIT(S): 100 INJECTION, SOLUTION INTRAVENOUS; SUBCUTANEOUS at 23:27

## 2025-01-01 RX ADMIN — Medication 150 MILLIGRAM(S): at 05:13

## 2025-01-01 RX ADMIN — DEXMEDETOMIDINE HYDROCHLORIDE IN SODIUM CHLORIDE 1.04 MICROGRAM(S)/KG/HR: 4 INJECTION INTRAVENOUS at 21:03

## 2025-01-01 RX ADMIN — INSULIN LISPRO 8 UNIT(S): 100 INJECTION, SOLUTION INTRAVENOUS; SUBCUTANEOUS at 23:51

## 2025-01-01 RX ADMIN — FUROSEMIDE 40 MILLIGRAM(S): 10 INJECTION INTRAMUSCULAR; INTRAVENOUS at 17:44

## 2025-01-01 RX ADMIN — MIDODRINE HYDROCHLORIDE 10 MILLIGRAM(S): 5 TABLET ORAL at 21:34

## 2025-01-01 RX ADMIN — Medication 100 MILLIGRAM(S): at 18:56

## 2025-01-01 RX ADMIN — Medication 166.67 MILLIGRAM(S): at 17:19

## 2025-01-01 RX ADMIN — Medication 1 APPLICATION(S): at 06:17

## 2025-01-01 RX ADMIN — DEXMEDETOMIDINE HYDROCHLORIDE IN SODIUM CHLORIDE 1.04 MICROGRAM(S)/KG/HR: 4 INJECTION INTRAVENOUS at 15:02

## 2025-01-01 RX ADMIN — CLOPIDOGREL BISULFATE 75 MILLIGRAM(S): 75 TABLET, FILM COATED ORAL at 11:53

## 2025-01-01 RX ADMIN — Medication 1 APPLICATION(S): at 05:38

## 2025-01-01 RX ADMIN — INSULIN LISPRO 4: 100 INJECTION, SOLUTION INTRAVENOUS; SUBCUTANEOUS at 22:36

## 2025-01-01 RX ADMIN — Medication 4.23 MICROGRAM(S)/KG/HR: at 22:11

## 2025-01-01 RX ADMIN — AMIODARONE HYDROCHLORIDE 200 MILLIGRAM(S): 50 INJECTION, SOLUTION INTRAVENOUS at 06:01

## 2025-01-01 RX ADMIN — Medication 40 MILLIGRAM(S): at 18:02

## 2025-01-01 RX ADMIN — Medication 166.67 MILLIGRAM(S): at 12:35

## 2025-01-01 RX ADMIN — FUROSEMIDE 40 MILLIGRAM(S): 10 INJECTION INTRAMUSCULAR; INTRAVENOUS at 13:36

## 2025-01-01 RX ADMIN — MUPIROCIN CALCIUM 1 APPLICATION(S): 20 CREAM TOPICAL at 17:25

## 2025-01-01 RX ADMIN — INSULIN LISPRO 8: 100 INJECTION, SOLUTION INTRAVENOUS; SUBCUTANEOUS at 01:47

## 2025-01-01 RX ADMIN — Medication 650 MILLIGRAM(S): at 05:00

## 2025-01-01 RX ADMIN — Medication 40 MILLIGRAM(S): at 05:04

## 2025-01-01 RX ADMIN — INSULIN GLARGINE-YFGN 34 UNIT(S): 100 INJECTION, SOLUTION SUBCUTANEOUS at 21:43

## 2025-01-01 RX ADMIN — INSULIN LISPRO 2: 100 INJECTION, SOLUTION INTRAVENOUS; SUBCUTANEOUS at 16:57

## 2025-01-01 RX ADMIN — Medication 650 MILLIGRAM(S): at 14:47

## 2025-01-01 RX ADMIN — Medication 1 APPLICATION(S): at 05:41

## 2025-01-01 RX ADMIN — INSULIN LISPRO 8 UNIT(S): 100 INJECTION, SOLUTION INTRAVENOUS; SUBCUTANEOUS at 12:34

## 2025-01-01 RX ADMIN — INSULIN LISPRO 8 UNIT(S): 100 INJECTION, SOLUTION INTRAVENOUS; SUBCUTANEOUS at 05:09

## 2025-01-01 RX ADMIN — MIDODRINE HYDROCHLORIDE 10 MILLIGRAM(S): 5 TABLET ORAL at 05:43

## 2025-01-01 RX ADMIN — APIXABAN 5 MILLIGRAM(S): 2.5 TABLET, FILM COATED ORAL at 17:33

## 2025-01-01 RX ADMIN — Medication 40 MILLIGRAM(S): at 05:42

## 2025-01-01 RX ADMIN — MIDODRINE HYDROCHLORIDE 10 MILLIGRAM(S): 5 TABLET ORAL at 13:18

## 2025-01-01 RX ADMIN — NOREPINEPHRINE BITARTRATE 7.92 MICROGRAM(S)/KG/MIN: 8 SOLUTION at 11:06

## 2025-01-01 RX ADMIN — Medication 4.17 MICROGRAM(S)/KG/HR: at 05:22

## 2025-01-01 RX ADMIN — INSULIN LISPRO 8 UNIT(S): 100 INJECTION, SOLUTION INTRAVENOUS; SUBCUTANEOUS at 00:16

## 2025-01-01 RX ADMIN — DEXMEDETOMIDINE HYDROCHLORIDE IN SODIUM CHLORIDE 1.04 MICROGRAM(S)/KG/HR: 4 INJECTION INTRAVENOUS at 06:52

## 2025-01-01 RX ADMIN — Medication 650 MILLIGRAM(S): at 04:30

## 2025-01-01 RX ADMIN — Medication 4.17 MICROGRAM(S)/KG/HR: at 21:15

## 2025-01-01 RX ADMIN — Medication 40 MILLIGRAM(S): at 17:10

## 2025-01-01 RX ADMIN — AMIODARONE HYDROCHLORIDE 200 MILLIGRAM(S): 50 INJECTION, SOLUTION INTRAVENOUS at 06:03

## 2025-01-01 RX ADMIN — Medication 1 APPLICATION(S): at 05:43

## 2025-01-01 RX ADMIN — INSULIN GLARGINE-YFGN 34 UNIT(S): 100 INJECTION, SOLUTION SUBCUTANEOUS at 22:02

## 2025-01-01 RX ADMIN — APIXABAN 5 MILLIGRAM(S): 2.5 TABLET, FILM COATED ORAL at 05:35

## 2025-01-01 RX ADMIN — INSULIN LISPRO 8 UNIT(S): 100 INJECTION, SOLUTION INTRAVENOUS; SUBCUTANEOUS at 18:06

## 2025-01-01 RX ADMIN — APIXABAN 5 MILLIGRAM(S): 2.5 TABLET, FILM COATED ORAL at 18:03

## 2025-01-01 RX ADMIN — MUPIROCIN CALCIUM 1 APPLICATION(S): 20 CREAM TOPICAL at 06:04

## 2025-01-01 RX ADMIN — BUMETANIDE 2 MILLIGRAM(S): 2 TABLET ORAL at 09:49

## 2025-01-01 RX ADMIN — Medication 15 MILLILITER(S): at 05:31

## 2025-01-01 RX ADMIN — Medication 166.67 MILLIGRAM(S): at 07:59

## 2025-01-01 RX ADMIN — EZETIMIBE 10 MILLIGRAM(S): 10 TABLET ORAL at 12:59

## 2025-01-01 RX ADMIN — Medication 2 TABLET(S): at 00:10

## 2025-01-01 RX ADMIN — IPRATROPIUM BROMIDE AND ALBUTEROL SULFATE 3 MILLILITER(S): .5; 2.5 SOLUTION RESPIRATORY (INHALATION) at 01:08

## 2025-01-01 RX ADMIN — AMIODARONE HYDROCHLORIDE 200 MILLIGRAM(S): 50 INJECTION, SOLUTION INTRAVENOUS at 07:01

## 2025-01-01 RX ADMIN — ATORVASTATIN CALCIUM 40 MILLIGRAM(S): 80 TABLET, FILM COATED ORAL at 21:14

## 2025-01-01 RX ADMIN — DEXTROMETHORPHAN HBR, GUAIFENESIN 600 MILLIGRAM(S): 200 LIQUID ORAL at 03:55

## 2025-01-01 RX ADMIN — Medication 2 MILLIGRAM(S): at 08:01

## 2025-01-01 RX ADMIN — METOPROLOL SUCCINATE 25 MILLIGRAM(S): 50 TABLET, EXTENDED RELEASE ORAL at 17:53

## 2025-01-01 RX ADMIN — MEROPENEM 100 MILLIGRAM(S): 1 INJECTION INTRAVENOUS at 17:17

## 2025-01-01 RX ADMIN — CEFEPIME 100 MILLIGRAM(S): 2 INJECTION, POWDER, FOR SOLUTION INTRAVENOUS at 17:29

## 2025-01-01 RX ADMIN — IPRATROPIUM BROMIDE AND ALBUTEROL SULFATE 3 MILLILITER(S): .5; 2.5 SOLUTION RESPIRATORY (INHALATION) at 20:23

## 2025-01-01 RX ADMIN — EZETIMIBE 10 MILLIGRAM(S): 10 TABLET ORAL at 12:28

## 2025-01-01 RX ADMIN — Medication 4.17 MICROGRAM(S)/KG/HR: at 15:50

## 2025-01-01 RX ADMIN — Medication 650 MILLIGRAM(S): at 02:25

## 2025-01-01 RX ADMIN — DEXMEDETOMIDINE HYDROCHLORIDE IN SODIUM CHLORIDE 1.04 MICROGRAM(S)/KG/HR: 4 INJECTION INTRAVENOUS at 18:40

## 2025-01-01 RX ADMIN — Medication 40 MILLIGRAM(S): at 05:33

## 2025-01-01 RX ADMIN — BUMETANIDE 1 MILLIGRAM(S): 1 TABLET ORAL at 06:01

## 2025-01-01 RX ADMIN — NOREPINEPHRINE BITARTRATE 7.7 MICROGRAM(S)/KG/MIN: 1 INJECTION INTRAVENOUS at 01:41

## 2025-01-01 RX ADMIN — APIXABAN 5 MILLIGRAM(S): 2.5 TABLET, FILM COATED ORAL at 06:04

## 2025-01-01 RX ADMIN — Medication 1 APPLICATION(S): at 05:24

## 2025-01-01 RX ADMIN — Medication 4.17 MICROGRAM(S)/KG/HR: at 22:20

## 2025-01-01 RX ADMIN — Medication 15 MILLILITER(S): at 17:36

## 2025-01-01 RX ADMIN — APIXABAN 5 MILLIGRAM(S): 2.5 TABLET, FILM COATED ORAL at 17:55

## 2025-01-01 RX ADMIN — INSULIN LISPRO 12: 100 INJECTION, SOLUTION INTRAVENOUS; SUBCUTANEOUS at 23:05

## 2025-01-01 RX ADMIN — INSULIN LISPRO 2: 100 INJECTION, SOLUTION INTRAVENOUS; SUBCUTANEOUS at 07:16

## 2025-01-01 RX ADMIN — METOPROLOL SUCCINATE 25 MILLIGRAM(S): 50 TABLET, EXTENDED RELEASE ORAL at 18:04

## 2025-01-01 RX ADMIN — IPRATROPIUM BROMIDE AND ALBUTEROL SULFATE 3 MILLILITER(S): .5; 2.5 SOLUTION RESPIRATORY (INHALATION) at 02:25

## 2025-01-01 RX ADMIN — Medication 2 TABLET(S): at 22:57

## 2025-01-01 RX ADMIN — ATORVASTATIN CALCIUM 40 MILLIGRAM(S): 80 TABLET, FILM COATED ORAL at 21:53

## 2025-01-01 RX ADMIN — INSULIN GLARGINE-YFGN 34 UNIT(S): 100 INJECTION, SOLUTION SUBCUTANEOUS at 22:53

## 2025-01-01 RX ADMIN — INSULIN GLARGINE-YFGN 34 UNIT(S): 100 INJECTION, SOLUTION SUBCUTANEOUS at 00:11

## 2025-01-01 RX ADMIN — DEXMEDETOMIDINE HYDROCHLORIDE IN SODIUM CHLORIDE 1.04 MICROGRAM(S)/KG/HR: 4 INJECTION INTRAVENOUS at 23:21

## 2025-01-01 RX ADMIN — DEXMEDETOMIDINE HYDROCHLORIDE IN SODIUM CHLORIDE 1.04 MICROGRAM(S)/KG/HR: 4 INJECTION INTRAVENOUS at 11:23

## 2025-01-01 RX ADMIN — AMIODARONE HYDROCHLORIDE 200 MILLIGRAM(S): 50 INJECTION, SOLUTION INTRAVENOUS at 05:32

## 2025-01-01 RX ADMIN — CEFEPIME 100 MILLIGRAM(S): 2 INJECTION, POWDER, FOR SOLUTION INTRAVENOUS at 05:32

## 2025-01-01 RX ADMIN — Medication 2 TABLET(S): at 22:03

## 2025-01-01 RX ADMIN — APIXABAN 5 MILLIGRAM(S): 2.5 TABLET, FILM COATED ORAL at 05:22

## 2025-01-01 RX ADMIN — Medication 4.23 MICROGRAM(S)/KG/HR: at 08:40

## 2025-01-01 RX ADMIN — CLOPIDOGREL BISULFATE 75 MILLIGRAM(S): 75 TABLET, FILM COATED ORAL at 13:06

## 2025-01-01 RX ADMIN — INSULIN LISPRO 6: 100 INJECTION, SOLUTION INTRAVENOUS; SUBCUTANEOUS at 23:51

## 2025-01-01 RX ADMIN — Medication 2 TABLET(S): at 21:24

## 2025-01-01 RX ADMIN — BUMETANIDE 2 MILLIGRAM(S): 1 TABLET ORAL at 08:01

## 2025-01-01 RX ADMIN — DEXMEDETOMIDINE HYDROCHLORIDE IN SODIUM CHLORIDE 1.04 MICROGRAM(S)/KG/HR: 4 INJECTION INTRAVENOUS at 11:19

## 2025-01-01 RX ADMIN — MEROPENEM 100 MILLIGRAM(S): 1 INJECTION INTRAVENOUS at 05:35

## 2025-01-01 RX ADMIN — Medication 15 MILLILITER(S): at 17:24

## 2025-01-01 RX ADMIN — Medication 40 MILLIGRAM(S): at 17:00

## 2025-01-01 RX ADMIN — INSULIN GLARGINE-YFGN 34 UNIT(S): 100 INJECTION, SOLUTION SUBCUTANEOUS at 22:32

## 2025-01-01 RX ADMIN — Medication 4.17 MICROGRAM(S)/KG/HR: at 05:32

## 2025-01-01 RX ADMIN — Medication 40 MILLIGRAM(S): at 12:27

## 2025-01-01 RX ADMIN — Medication 4.23 MICROGRAM(S)/KG/HR: at 07:49

## 2025-01-01 RX ADMIN — INSULIN LISPRO 2: 100 INJECTION, SOLUTION INTRAVENOUS; SUBCUTANEOUS at 17:22

## 2025-01-01 RX ADMIN — Medication 4.17 MICROGRAM(S)/KG/HR: at 07:57

## 2025-01-01 RX ADMIN — CLOPIDOGREL BISULFATE 75 MILLIGRAM(S): 75 TABLET, FILM COATED ORAL at 11:50

## 2025-01-01 RX ADMIN — Medication 4.23 MICROGRAM(S)/KG/HR: at 06:58

## 2025-01-01 RX ADMIN — Medication 4.17 MICROGRAM(S)/KG/HR: at 13:59

## 2025-01-01 RX ADMIN — CLOPIDOGREL BISULFATE 75 MILLIGRAM(S): 75 TABLET, FILM COATED ORAL at 11:07

## 2025-01-01 NOTE — DIETITIAN INITIAL EVALUATION ADULT - PERTINENT LABORATORY DATA
01-01    139  |  99  |  53[H]  ----------------------------<  225[H]  5.2[H]   |  21  |  2.3[H]    Ca    9.2      01 Jan 2025 05:55  Mg     2.6     01-01    TPro  5.8[L]  /  Alb  3.2[L]  /  TBili  0.9  /  DBili  x   /  AST  412[H]  /  ALT  229[H]  /  AlkPhos  89  01-01  POCT Blood Glucose.: 196 mg/dL (01-01-25 @ 16:42)  A1C with Estimated Average Glucose Result: 9.9 % (12-28-24 @ 07:18)  A1C with Estimated Average Glucose Result: 11.3 % (10-15-24 @ 05:00)                          15.3   12.45 )-----------( 402      ( 01 Jan 2025 05:55 )             47.6

## 2025-01-01 NOTE — PROGRESS NOTE ADULT - ASSESSMENT
IMPRESSION:    Acute Hypoxemic Respiratory Failure   Pulmonary Edema  Acute Decompensated HFmrEF   Toxic Metabolic Encephalopathy   Persistent AFIB awaiting Albation   HO ICM s/p CRT-D  HO Bioprosthetic AV  HO CVA  HO DM   HO COPD     PLAN:    CNS:  Precedex, CT Head 12/31 noted, rEEG    HEENT: Oral and ETT care    PULMONARY:  HOB @ 45 degrees.  Aspiration precautions, decrease TV to 420, post-intubation ABG noted, CXR noted     CARDIOVASCULAR: Volume contraction as tolerated, optimize cardiac therapy, Bumex BID, rate control, Cardiology follow up, TTE reviewed, proBNP noted, trend CE    GI: GI prophylaxis. NPO for now.  Bowel regimen     RENAL:  Follow up lytes.  Correct as needed.  Worsening KAREN with declining UOP.  Consult nephrology.    INFECTIOUS DISEASE: Follow up cultures, empiric broad spectrum Abx.     HEMATOLOGICAL: c/w therapeutic LMWH     ENDOCRINE:  Follow up FS.  Insulin protocol if needed.    MUSCULOSKELETAL: bedrest for now    Goals of care     MICU monitoring

## 2025-01-01 NOTE — CONSULT NOTE ADULT - SUBJECTIVE AND OBJECTIVE BOX
SERENAMONIQUE  69y, Male  Allergies    sulfa drugs (Unknown)    Intolerances        LOS  5d    HPI  HPI:  This is a 69-year-old male with past medical history of chronic HFrEF s/p ICD, AVR porcine, HTN, PAF s/p ablation, CAD s/p PCI, DM, HLD, COPD (no home O2), smoker presenting with worsening shortness of breath and weakness.  Patient was admitted within the last 24 hours for acute on chronic CHF exacerbation, was given IV Lasix and BiPAP, and left AMA.  Patient returns stating that he became more short of breath last night after leaving the hospital and would like to be readmitted for treatment.  Denies fever/chills, chest pain, abdominal pain, calf pain, N/V/D/C, cough, and nasal congestion.      (27 Dec 2024 10:46)      INFECTIOUS DISEASE HISTORY:  ID consulted for antimicrobial recommendations.     Prior hospital charts reviewed [Yes]  Primary team notes reviewed [Yes]  Other consultant notes reviewed [Yes]    REVIEW OF SYSTEMS:  CONSTITUTIONAL: No fever or chills  HEENT: No sore throat  RESPIRATORY: No cough, no shortness of breath  CARDIOVASCULAR: No chest pain or palpitations  GASTROINTESTINAL: No abdominal or epigastric pain  GENITOURINARY: No dysuria  NEUROLOGICAL: No headache/dizziness  MSK: No joint pain, erythema, or swelling; no back pain  SKIN: No itching, rashes  All other ROS negative except noted above    PAST MEDICAL & SURGICAL HISTORY:  CHF (congestive heart failure)      Diabetes      CAD (coronary artery disease)      Chronic obstructive pulmonary disease (COPD)      HTN (hypertension)      Stented coronary artery      Dyslipidemia      GERD (gastroesophageal reflux disease)      Afib      CVA (cerebral vascular accident)      H/O heart artery stent      Heart valve replaced  aortic      History of Nissen fundoplication      SOCIAL HISTORY:  - No recent travel    FAMILY HISTORY: No pertinent PMH for first degree relatives.     ANTIMICROBIALS:  cefepime   IVPB    cefepime   IVPB 2000 every 12 hours  vancomycin  IVPB    vancomycin  IVPB 1250 every 12 hours      ANTIMICROBIALS (past 90 days):  MEDICATIONS  (STANDING):    azithromycin  IVPB   500 milliGRAM(s) IV Intermittent (12-27-24 @ 12:44)    azithromycin  IVPB   255 mL/Hr IV Intermittent (12-30-24 @ 12:35)   255 mL/Hr IV Intermittent (12-29-24 @ 12:19)   255 mL/Hr IV Intermittent (12-28-24 @ 12:42)    cefepime   IVPB   100 mL/Hr IV Intermittent (12-31-24 @ 15:17)    cefepime   IVPB   100 mL/Hr IV Intermittent (01-01-25 @ 05:15)    cefTRIAXone   IVPB   100 mL/Hr IV Intermittent (12-30-24 @ 11:55)   100 mL/Hr IV Intermittent (12-29-24 @ 12:20)   100 mL/Hr IV Intermittent (12-28-24 @ 12:43)   100 mL/Hr IV Intermittent (12-27-24 @ 12:44)    vancomycin  IVPB   166.67 mL/Hr IV Intermittent (12-31-24 @ 17:30)    vancomycin  IVPB   166.67 mL/Hr IV Intermittent (01-01-25 @ 05:14)        OTHER MEDS:   MEDICATIONS  (STANDING):  acetaminophen     Tablet .. 650 every 6 hours PRN  albuterol/ipratropium for Nebulization 3 every 6 hours PRN  aluminum hydroxide/magnesium hydroxide/simethicone Suspension 30 every 4 hours PRN  aMIOdarone    Tablet 200 daily  atorvastatin 40 at bedtime  buMETAnide Injectable 2 once  buMETAnide Injectable 2 every 12 hours  clopidogrel Tablet 75 daily  dexMEDEtomidine Infusion 0.2 <Continuous>  dextrose 50% Injectable 25 once  dextrose 50% Injectable 12.5 once  dextrose 50% Injectable 25 once  dextrose Oral Gel 15 once PRN  ezetimibe 10 daily  fenofibrate Tablet 145 daily  fentaNYL   Infusion. 0.5 <Continuous>  glucagon  Injectable 1 once  hydrOXYzine hydrochloride 25 every 6 hours PRN  insulin glargine Injectable (LANTUS) 10 at bedtime  insulin lispro (ADMELOG) corrective regimen sliding scale  three times a day before meals  melatonin 3 at bedtime PRN  metoprolol tartrate 150 two times a day  norepinephrine Infusion 0.05 <Continuous>  ondansetron Injectable 4 every 8 hours PRN  zolpidem 5 at bedtime PRN      VITALS:  Vital Signs Last 24 Hrs  T(F): 99.6 (01-01-25 @ 15:00), Max: 104.4 (12-31-24 @ 15:05)    Vital Signs Last 24 Hrs  HR: 77 (01-01-25 @ 15:00) (70 - 106)  BP: 88/55 (01-01-25 @ 15:00) (80/52 - 130/68)  RR: 20 (01-01-25 @ 15:00)  SpO2: 100% (01-01-25 @ 15:00) (98% - 100%)  Wt(kg): --    EXAM:  GENERAL: On MV  HEAD: No head lesions  NECK: Supple  CHEST/LUNG: Shallow breath sounds.   HEART: S1 S2  ABDOMEN: Soft, nontender, Distended.   EXTREMITIES: No clubbing, cyanosis, or petal edema  NERVOUS SYSTEM: On MV  MSK: No joint erythema, swelling or pain  SKIN: No rashes or lesions, no superficial thrombophlebitis    Labs:                        15.3   12.45 )-----------( 402      ( 01 Jan 2025 05:55 )             47.6     01-01    139  |  99  |  53[H]  ----------------------------<  225[H]  5.2[H]   |  21  |  2.3[H]    Ca    9.2      01 Jan 2025 05:55  Mg     2.6     01-01    TPro  5.8[L]  /  Alb  3.2[L]  /  TBili  0.9  /  DBili  x   /  AST  412[H]  /  ALT  229[H]  /  AlkPhos  89  01-01      WBC Trend:  WBC Count: 12.45 (01-01-25 @ 05:55)  WBC Count: 14.78 (12-31-24 @ 15:45)  WBC Count: 16.23 (12-31-24 @ 03:47)  WBC Count: 15.72 (12-30-24 @ 08:02)      Auto Neutrophil #: 9.80 K/uL (01-01-25 @ 05:55)  Auto Neutrophil #: 13.38 K/uL (12-31-24 @ 15:45)  Auto Neutrophil #: 14.05 K/uL (12-31-24 @ 03:47)  Auto Neutrophil #: 12.36 K/uL (12-30-24 @ 08:02)  Auto Neutrophil #: 13.57 K/uL (12-29-24 @ 08:52)      Creatine Trend:  Creatinine: 2.3 (01-01)  Creatinine: 2.0 (12-31)  Creatinine: 1.7 (12-31)  Creatinine: 1.1 (12-31)      Liver Biochemical Testing Trend:  Alanine Aminotransferase (ALT/SGPT): 229 *H* (01-01)  Alanine Aminotransferase (ALT/SGPT): 37 (12-31)  Alanine Aminotransferase (ALT/SGPT): 22 (12-31)  Alanine Aminotransferase (ALT/SGPT): 21 (12-30)  Alanine Aminotransferase (ALT/SGPT): 24 (12-29)  Aspartate Aminotransferase (AST/SGOT): 412 (01-01-25 @ 05:55)  Aspartate Aminotransferase (AST/SGOT): 87 (12-31-24 @ 15:45)  Aspartate Aminotransferase (AST/SGOT): 27 (12-31-24 @ 03:47)  Aspartate Aminotransferase (AST/SGOT): 20 (12-30-24 @ 08:02)  Aspartate Aminotransferase (AST/SGOT): 25 (12-29-24 @ 12:21)  Bilirubin Total: 0.9 (01-01)  Bilirubin Total: 1.3 (12-31)  Bilirubin Total: 0.8 (12-31)  Bilirubin Total: 0.8 (12-30)  Bilirubin Total: 0.9 (12-29)      Trend LDH      Auto Eosinophil %: 0.0 % (01-01-25 @ 05:55)  Auto Eosinophil %: 0.1 % (12-31-24 @ 15:45)  Auto Eosinophil %: 0.6 % (12-31-24 @ 03:47)  Auto Eosinophil %: 0.5 % (12-30-24 @ 08:02)      Urinalysis Basic - ( 01 Jan 2025 05:55 )    Color: x / Appearance: x / SG: x / pH: x  Gluc: 225 mg/dL / Ketone: x  / Bili: x / Urobili: x   Blood: x / Protein: x / Nitrite: x   Leuk Esterase: x / RBC: x / WBC x   Sq Epi: x / Non Sq Epi: x / Bacteria: x        MICROBIOLOGY:    Male  Procalcitonin: 0.04 (12-27)  Troponin T, High Sensitivity Result: 42 (12-31)  Troponin T, High Sensitivity Result: 40 (12-31)  Troponin T, High Sensitivity Result: 49 (12-27)  Troponin T, High Sensitivity Result: 52 (12-27)  Troponin T, High Sensitivity Result: 56 (12-27)  Troponin T, High Sensitivity Result: 58 (12-26)  Troponin T, High Sensitivity Result: 64 (12-26)    Lactate, Blood: 2.3 (12-31 @ 15:45)  Blood Gas Arterial, Lactate: 1.4 (12-31 @ 14:23)  Blood Gas Arterial, Lactate: 1.7 (12-31 @ 03:36)    A1C with Estimated Average Glucose Result: 9.9 % (12-28-24 @ 07:18)  A1C with Estimated Average Glucose Result: 11.3 % (10-15-24 @ 05:00)      INFLAMMATORY MARKERS      RADIOLOGY & ADDITIONAL TESTS:  I have personally reviewed the imagings.  CXR  Xray Chest 1 View- PORTABLE-Urgent:   ACC: [id number] EXAM:  XR CHEST PORTABLE URGENT 1V   ORDERED BY: JORDON STORM     PROCEDURE DATE:  12/31/2024          INTERPRETATION:  CLINICAL HISTORY: Follow-up.    COMPARISON: December 31, 2024.    TECHNIQUE: Portable frontal chest radiograph. Low lung volume.    FINDINGS:    Support devices: ETT with its tip above the oscar and NGT with its tip   below the diaphragm. Left-sided permanent pacemaker.    Cardiac/mediastinum/hilum: Stable.    Lung parenchyma/Pleura: Bilateral opacities, unchanged. No pneumothorax   is seen.    Skeleton/soft tissues: Stable.      IMPRESSION: Low lung volume.    Bilateral opacities, unchanged.    Support tubes as above.    Left sided permanent pacemaker.    --- End of Report ---            AISLINN ZAMORA; Attending Radiologist  This document has been electronically signed. Dec 31 2024  5:16PM (12-31-24 @ 15:31)      CT  < from: Xray Chest 1 View AP/PA (12.31.24 @ 13:57) >  FINDINGS:    Support devices: Endotracheal tube is in good position. Again seen is a   left-sided cardiac pacing device    Cardiac/mediastinum/hilum: Stable.    Lung parenchyma/Pleura: Stable bilateral lung opacities    Skeleton/soft tissues: Stable.      IMPRESSION:    Stable bilateral lung opacities    --- End of Report ---    < end of copied text >  < from: TTE Echo Complete w/o Contrast w/ Doppler (12.27.24 @ 12:57) >    Summary:   1. Left ventricular ejection fraction, by visual estimation, is 40 to   45%.   2. Mildly enlarged left atrium.   3. Mild mitral annular calcification.   4. Mild mitral valve regurgitation.   5. Mild tricuspid regurgitation.   6. Bioprosthesis in the aortic position.   7. Ascending aorta 3.7 cm.   8. Estimated pulmonary artery systolic pressure is 35.7 mmHg assuming a   right atrial pressure of 3 mmHg, which is consistent with borderline   pulmonary hypertension.    < end of copied text >      CARDIOLOGY TESTING  12 Lead ECG:   Ventricular Rate 123 BPM    QRS Duration 158 ms    Q-T Interval 374 ms    QTC Calculation(Bazett) 535 ms    R Axis -22 degrees    T Axis 51 degrees    Diagnosis Line Ventricular pacing  Confirmed by RITA FRANCO MD ([id number]) on 12/31/2024 5:02:56 PM (12-31-24 @ 14:48)  12 Lead ECG:   Ventricular Rate 124 BPM    Atrial Rate 153 BPM    QRS Duration 140 ms    Q-T Interval 368 ms    QTC Calculation(Bazett) 528 ms    R Axis -55 degrees    T Axis 133 degrees    Diagnosis Line Ventricular pacing  Confirmed by RITA FRANCO MD [id number]) on 12/31/2024 5:02:41 PM (12-31-24 @ 07:12)

## 2025-01-01 NOTE — DIETITIAN INITIAL EVALUATION ADULT - CALCULATED FROM (CAL/KG)
Clear liquid diet.  Advance as tolerated to a bland diet.  Follow up with your doctor as well regarding your anxiety and grieving.  
2622

## 2025-01-01 NOTE — DIETITIAN INITIAL EVALUATION ADULT - ORAL INTAKE PTA/DIET HISTORY
as per family at bedside pt consumed a regular diet at home, uncertain of any food allergies or intolerances. denies any recent weight changes.     pt is presently intubated to vent.  as per family at bedside pt consumed a regular diet at home, uncertain of any food allergies or intolerances. denies any recent weight changes. as per EMR review weight stable from october of this year 80.1 kgs vs 82 kgs    pt is presently intubated to vent.

## 2025-01-01 NOTE — DIETITIAN INITIAL EVALUATION ADULT - ENTER TO (G/KG)
"Chief Complaint   Patient presents with   • Other     episode last night where he c/o lips tingling, crosseyed, chest pain and confusion   • Body Aches     \"all over\"     Ambulatory to triage with spouse for above. She states he has remained somewhat confused and has \"poor decision making today\". He does not recall events that took place yesterday. Answers orientation questions correctly in triage but takes some time to think of answers. No facial droop or arm drift in triage.  VSS. Explained triage process, to waiting room. Asked to inform RN if questions or concerns arise.   "
1.4

## 2025-01-01 NOTE — DIETITIAN INITIAL EVALUATION ADULT - CALCULATED TO (CAL/KG)
2052 Doxepin Counseling:  Patient advised that the medication is sedating and not to drive a car after taking this medication. Patient informed of potential adverse effects including but not limited to dry mouth, urinary retention, and blurry vision.  The patient verbalized understanding of the proper use and possible adverse effects of doxepin.  All of the patient's questions and concerns were addressed.

## 2025-01-01 NOTE — CONSULT NOTE ADULT - ASSESSMENT
ASSESSMENT  This is a 69-year-old male with past medical history of chronic HFrEF s/p ICD, AVR porcine, HTN, PAF s/p ablation, CAD s/p PCI, DM, HLD, COPD (no home O2), smoker presenting with worsening shortness of breath and weakness.    IMPRESSION  #Acute hypoxic respiratory failure   Likely from V-Tach and heart failure exacerbation  Doubt bacterial pneumonia  #Leukocytosis-Likely reactive.   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    RECOMMENDATIONS  -Unclear why was the patient on abx initially.   -Obtain MRSA nares. Sputum cultures.  -US liver and Kidneys  -Follow up with blood cultures.  -D/C IV vanc. Likely will not be required any further.  -IV Cefepime 2 gram q12 hours.   -Cardiology/EP follow up.   -Off loading to prevent pressure sores and preventive measures to avoid aspiration. TOV when able.     If any questions, please send a message or call on [employer] Teams  Please continue to update ID with any pertinent new laboratory or radiographic findings.    Benigno Pond M.D  Infectious Diseases Attending/   [employer] at [facility]   ASSESSMENT  This is a 69-year-old male with past medical history of chronic HFrEF s/p ICD, AVR porcine, HTN, PAF s/p ablation, CAD s/p PCI, DM, HLD, COPD (no home O2), smoker presenting with worsening shortness of breath and weakness.    IMPRESSION  #Acute hypoxic respiratory failure   Likely from V-Tach and heart failure exacerbation  #High Fevers- Perhaps Aspirational pneumonitis  #Leukocytosis-Likely reactive.   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    RECOMMENDATIONS  -Unclear why was the patient on abx initially.   -Obtain MRSA nares. Sputum cultures.  -US liver and Kidneys  -Follow up with blood cultures.  -D/C IV vanc. Likely will not be required any further.  -IV Cefepime 2 gram q12 hours.   -Cardiology/EP follow up.   -Off loading to prevent pressure sores and preventive measures to avoid aspiration. TOV when able.     If any questions, please send a message or call on [employer] Teams  Please continue to update ID with any pertinent new laboratory or radiographic findings.    Benigno Pond M.D  Infectious Diseases Attending/   [employer] at [facility]   ASSESSMENT  This is a 69-year-old male with past medical history of chronic HFrEF s/p ICD, AVR porcine, HTN, PAF s/p ablation, CAD s/p PCI, DM, HLD, COPD (no home O2), smoker presenting with worsening shortness of breath and weakness.    IMPRESSION  #Acute hypoxic respiratory failure   Likely from V-Tach and heart failure exacerbation. Cardiogenic shock  #High Fevers- Perhaps Aspirational pneumonitis  #Leukocytosis-Likely reactive.   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    RECOMMENDATIONS  -Unclear why was the patient on abx initially.   -Obtain MRSA nares. Sputum cultures.  -US liver and Kidneys  -Follow up with blood cultures.  -D/C IV vanc. Likely will not be required any further.  -IV Cefepime 2 gram q12 hours.   -Cardiology/EP follow up.   -Off loading to prevent pressure sores and preventive measures to avoid aspiration. TOV when able.     If any questions, please send a message or call on [employer] Teams  Please continue to update ID with any pertinent new laboratory or radiographic findings.    Benigno Pond M.D  Infectious Diseases Attending/   [employer] at [facility]

## 2025-01-01 NOTE — PROGRESS NOTE ADULT - SUBJECTIVE AND OBJECTIVE BOX
Patient is a 69y old  Male who presents with a chief complaint of CHF exacerbation (31 Dec 2024 15:06)        Over Night Events:        ROS:     All ROS are negative except HPI   Unable to assess ROS: patient intubated.        PHYSICAL EXAM    ICU Vital Signs Last 24 Hrs  T(C): 37.6 (01 Jan 2025 02:00), Max: 40.2 (31 Dec 2024 15:05)  T(F): 99.7 (01 Jan 2025 02:00), Max: 104.4 (31 Dec 2024 15:05)  HR: 74 (01 Jan 2025 06:00) (73 - 124)  BP: 114/63 (01 Jan 2025 06:00) (80/52 - 165/87)  BP(mean): 84 (01 Jan 2025 06:00) (55 - 120)  ABP: --  ABP(mean): --  RR: 20 (01 Jan 2025 06:00) (8 - 34)  SpO2: 100% (01 Jan 2025 06:00) (99% - 100%)    O2 Parameters below as of 01 Jan 2025 06:00  Patient On (Oxygen Delivery Method): ventilator    O2 Concentration (%): 60        Constitutional: no acute distress, well nourished well developed  Neuro: moving all 4 limbs spontaneously, no facial droop or dysarthria  HEENT: NCAT, anicteric  Neck: no visible lymphadenopathy or goiter  Pulm: no respiratory distress. clear to auscultation bilaterally  Cardiac: extremities appear pink and well-perfused.  regular rhythm and rate, no murmur detected  Abdomen: non-distended  Extremities: no peripheral edema      12-30-24 @ 07:01  -  12-31-24 @ 07:00  --------------------------------------------------------  IN:    IV PiggyBack: 300 mL    Oral Fluid: 857 mL  Total IN: 1157 mL    OUT:    Voided (mL): 1300 mL  Total OUT: 1300 mL    Total NET: -143 mL      12-31-24 @ 07:01  -  01-01-25 @ 06:49  --------------------------------------------------------  IN:    Dexmedetomidine: 189.7 mL    FentaNYL: 140.9 mL    IV PiggyBack: 300 mL    Norepinephrine: 292.9 mL  Total IN: 923.5 mL    OUT:    Indwelling Catheter - Urethral (mL): 365 mL  Total OUT: 365 mL    Total NET: 558.5 mL          LABS:                            16.4   14.78 )-----------( 529      ( 31 Dec 2024 15:45 )             50.3                                               12-31    135  |  98  |  42[H]  ----------------------------<  222[H]  5.1[H]   |  20  |  2.0[H]    Ca    8.8      31 Dec 2024 19:14  Mg     2.0     12-31    TPro  6.2  /  Alb  3.2[L]  /  TBili  1.3[H]  /  DBili  x   /  AST  87[H]  /  ALT  37  /  AlkPhos  97  12-31      PT/INR - ( 31 Dec 2024 15:45 )   PT: 32.20 sec;   INR: 2.67 ratio         PTT - ( 31 Dec 2024 15:45 )  PTT:37.5 sec                                       Urinalysis Basic - ( 31 Dec 2024 19:14 )    Color: x / Appearance: x / SG: x / pH: x  Gluc: 222 mg/dL / Ketone: x  / Bili: x / Urobili: x   Blood: x / Protein: x / Nitrite: x   Leuk Esterase: x / RBC: x / WBC x   Sq Epi: x / Non Sq Epi: x / Bacteria: x                                                  LIVER FUNCTIONS - ( 31 Dec 2024 15:45 )  Alb: 3.2 g/dL / Pro: 6.2 g/dL / ALK PHOS: 97 U/L / ALT: 37 U/L / AST: 87 U/L / GGT: x                                                                                               Mode: AC/ CMV (Assist Control/ Continuous Mandatory Ventilation)  RR (machine): 20  TV (machine): 420  FiO2: 60  PEEP: 8  ITime: 1  MAP: 12  PIP: 23                                      ABG - ( 01 Jan 2025 04:30 )  pH, Arterial: x     pH, Blood: 7.37  /  pCO2: 42    /  pO2: 205   / HCO3: 24    / Base Excess: -1.1  /  SaO2: 99.0                MEDICATIONS  (STANDING):  aMIOdarone    Tablet 200 milliGRAM(s) Oral daily  atorvastatin 40 milliGRAM(s) Oral at bedtime  buMETAnide Injectable 2 milliGRAM(s) IV Push once  buMETAnide Injectable 2 milliGRAM(s) IV Push every 12 hours  cefepime   IVPB      cefepime   IVPB 2000 milliGRAM(s) IV Intermittent every 12 hours  chlorhexidine 0.12% Liquid 15 milliLiter(s) Oral Mucosa every 12 hours  chlorhexidine 2% Cloths 1 Application(s) Topical <User Schedule>  clopidogrel Tablet 75 milliGRAM(s) Oral daily  dexMEDEtomidine Infusion 0.2 MICROgram(s)/kG/Hr (4.11 mL/Hr) IV Continuous <Continuous>  dextrose 5%. 1000 milliLiter(s) (50 mL/Hr) IV Continuous <Continuous>  ezetimibe 10 milliGRAM(s) Oral daily  fenofibrate Tablet 145 milliGRAM(s) Oral daily  fentaNYL   Infusion. 0.5 MICROgram(s)/kG/Hr (4.11 mL/Hr) IV Continuous <Continuous>  insulin lispro (ADMELOG) corrective regimen sliding scale   SubCutaneous three times a day before meals  metoprolol tartrate 150 milliGRAM(s) Oral two times a day  norepinephrine Infusion 0.05 MICROgram(s)/kG/Min (7.7 mL/Hr) IV Continuous <Continuous>  vancomycin  IVPB      vancomycin  IVPB 1250 milliGRAM(s) IV Intermittent every 12 hours    MEDICATIONS  (PRN):  acetaminophen     Tablet .. 650 milliGRAM(s) Oral every 6 hours PRN Temp greater or equal to 38C (100.4F), Mild Pain (1 - 3)  albuterol/ipratropium for Nebulization 3 milliLiter(s) Nebulizer every 6 hours PRN Shortness of Breath and/or Wheezing  aluminum hydroxide/magnesium hydroxide/simethicone Suspension 30 milliLiter(s) Oral every 4 hours PRN Dyspepsia  dextrose Oral Gel 15 Gram(s) Oral once PRN Blood Glucose LESS THAN 70 milliGRAM(s)/deciliter  hydrOXYzine hydrochloride 25 milliGRAM(s) Oral every 6 hours PRN Agitation  melatonin 3 milliGRAM(s) Oral at bedtime PRN Insomnia  ondansetron Injectable 4 milliGRAM(s) IV Push every 8 hours PRN Nausea and/or Vomiting  zolpidem 5 milliGRAM(s) Oral at bedtime PRN Insomnia      New X-rays reviewed:       ECHO reviewed    CXR interpreted by me:      1/1  images and radiologist read reviewed, by my read demonstrating stable bilateral opacities, ETT in good position.

## 2025-01-01 NOTE — DIETITIAN INITIAL EVALUATION ADULT - ADD RECOMMEND
-if KUB wnls, add aggressive bowel regimen   -initiate EN of peptamen AF at 20 ml/hr increase as tolerated to goal rate of  67ml/hr x 18 hrs with H20 flushes of 100 ml pre and post feeds will provide pt with 1500 kcals, 95g protein and 1200+200 ml total volume (1215 ml free water) meeting >80% of estimated needs  high risk

## 2025-01-01 NOTE — DIETITIAN INITIAL EVALUATION ADULT - PERTINENT MEDS FT
MEDICATIONS  (STANDING):  aMIOdarone    Tablet 200 milliGRAM(s) Oral daily  atorvastatin 40 milliGRAM(s) Oral at bedtime  buMETAnide Injectable 2 milliGRAM(s) IV Push once  buMETAnide Injectable 2 milliGRAM(s) IV Push every 12 hours     cefepime   IVPB 2000 milliGRAM(s) IV Intermittent every 12 hours  clopidogrel Tablet 75 milliGRAM(s) Oral daily  dexMEDEtomidine Infusion 0.2 MICROgram(s)/kG/Hr (4.11 mL/Hr) IV Continuous <Continuous>  dextrose 5%. 1000 milliLiter(s) (50 mL/Hr) IV Continuous <Continuous>  ezetimibe 10 milliGRAM(s) Oral daily  fenofibrate Tablet 145 milliGRAM(s) Oral daily  fentaNYL   Infusion. 0.5 MICROgram(s)/kG/Hr (4.11 mL/Hr) IV Continuous <Continuous>  insulin glargine Injectable (LANTUS) 10 Unit(s) SubCutaneous at bedtime  insulin lispro (ADMELOG) corrective regimen sliding scale   SubCutaneous three times a day before meals  metoprolol tartrate 150 milliGRAM(s) Oral two times a day  norepinephrine Infusion 0.05 MICROgram(s)/kG/Min (7.7 mL/Hr) IV Continuous <Continuous>  sodium zirconium cyclosilicate 10 Gram(s) Oral every 8 hours    MEDICATIONS  (PRN):  acetaminophen     Tablet .. 650 milliGRAM(s) Oral every 6 hours PRN Temp greater or equal to 38C (100.4F), Mild Pain (1 - 3)  albuterol/ipratropium for Nebulization 3 milliLiter(s) Nebulizer every 6 hours PRN Shortness of Breath and/or Wheezing  aluminum hydroxide/magnesium hydroxide/simethicone Suspension 30 milliLiter(s) Oral every 4 hours PRN Dyspepsia  dextrose Oral Gel 15 Gram(s) Oral once PRN Blood Glucose LESS THAN 70 milliGRAM(s)/deciliter  hydrOXYzine hydrochloride 25 milliGRAM(s) Oral every 6 hours PRN Agitation  melatonin 3 milliGRAM(s) Oral at bedtime PRN Insomnia  ondansetron Injectable 4 milliGRAM(s) IV Push every 8 hours PRN Nausea and/or Vomiting  zolpidem 5 milliGRAM(s) Oral at bedtime PRN Insomnia

## 2025-01-01 NOTE — DIETITIAN INITIAL EVALUATION ADULT - NSICDXPASTSURGICALHX_GEN_ALL_CORE_FT
· Patient recently had Lasix doubled to 80 mg daily  · He states the tinnitus is gone PAST SURGICAL HISTORY:  H/O heart artery stent     Heart valve replaced aortic    History of Nissen fundoplication

## 2025-01-01 NOTE — PROGRESS NOTE ADULT - SUBJECTIVE AND OBJECTIVE BOX
Pt seen, calm     T(F): , Max: 100.4 (01-01-25 @ 19:00)  HR: 86 (01-01-25 @ 23:00) (70 - 94)  BP: 131/71 (01-01-25 @ 23:00)  RR: 23 (01-01-25 @ 23:00)  SpO2: 100% (01-01-25 @ 23:00)  IN: 923.5 mL / OUT: 405 mL / NET: 518.5 mL    IN: 336.6 mL / OUT: 1820 mL / NET: -1483.4 mL      General: No apparent distress  Cardiovascular: S1, S2  Gastrointestinal: Soft, Non-tender, Non-distended  Respiratory: vented  Lymphatic: No edema  Neurologic: sedated  Dermatologic: Skin dry  PT/INR - ( 31 Dec 2024 15:45 )   PT: 32.20 sec;   INR: 2.67 ratio         PTT - ( 31 Dec 2024 15:45 )  PTT:37.5 sec                        15.3   12.45 )-----------( 402      ( 01 Jan 2025 05:55 )             47.6     01-01    142  |  103  |  53[H]  ----------------------------<  110[H]  4.4   |  25  |  1.9[H]    Ca    8.7      01 Jan 2025 19:31  Mg     2.6     01-01    TPro  5.8[L]  /  Alb  3.2[L]  /  TBili  0.9  /  DBili  x   /  AST  412[H]  /  ALT  229[H]  /  AlkPhos  89  01-01

## 2025-01-01 NOTE — PROGRESS NOTE ADULT - ASSESSMENT
69-year-old male with past medical history of chronic HFrEF s/p ICD, AVR porcine, HTN, PAF s/p ablation, CAD s/p PCI, DM, HLD, COPD (no home O2), smoker presenting with worsening shortness of breath and weakness with concern for acute on chronic CHF exacerbation in the setting of persistent afib      #Acute on chronic CHF exacerbation in HFmrEF  #Persistent Atrial fibrillation w/ RVR  - Lasix 40mg IV BID  - bb  - entresto    #AMS  #H/o LT MCA CVA  - CT head unchanged    #CAP, mild  - on cefepima    #CAD  -C/w eliquis, plavix, Bb and lipitor    #DM  -ssi  -Hold metformin    #COPD   - LABA/ICS    #Hyperlipidemia  - tricor, zetia, lipitor

## 2025-01-01 NOTE — DIETITIAN INITIAL EVALUATION ADULT - ENTER TO (ML/KG)
molly [phone]   20

## 2025-01-01 NOTE — DIETITIAN INITIAL EVALUATION ADULT - OTHER INFO
pt is 69 year old male with hx of Chronic HFrEF s/p ICD, AVR porcine, HTN, PAVF s/p ablation, CAD, DM, COPD + smoker p/w SOB and weakness. pt admitted with acute CHF exacerbation vs PNA, elevated trops. 12/31 s/p RR due to hypoxia, intubation warranted with upgrade to ICU.

## 2025-01-02 ENCOUNTER — APPOINTMENT (OUTPATIENT)
Dept: ELECTROPHYSIOLOGY | Facility: HOSPITAL | Age: 70
End: 2025-01-02

## 2025-01-02 DIAGNOSIS — I11.0 HYPERTENSIVE HEART DISEASE WITH HEART FAILURE: ICD-10-CM

## 2025-01-02 DIAGNOSIS — E11.9 TYPE 2 DIABETES MELLITUS WITHOUT COMPLICATIONS: ICD-10-CM

## 2025-01-02 DIAGNOSIS — J96.00 ACUTE RESPIRATORY FAILURE, UNSPECIFIED WHETHER WITH HYPOXIA OR HYPERCAPNIA: ICD-10-CM

## 2025-01-02 DIAGNOSIS — R06.02 SHORTNESS OF BREATH: ICD-10-CM

## 2025-01-02 DIAGNOSIS — J44.9 CHRONIC OBSTRUCTIVE PULMONARY DISEASE, UNSPECIFIED: ICD-10-CM

## 2025-01-02 DIAGNOSIS — I25.10 ATHEROSCLEROTIC HEART DISEASE OF NATIVE CORONARY ARTERY WITHOUT ANGINA PECTORIS: ICD-10-CM

## 2025-01-02 DIAGNOSIS — I48.0 PAROXYSMAL ATRIAL FIBRILLATION: ICD-10-CM

## 2025-01-02 DIAGNOSIS — Z53.21 PROCEDURE AND TREATMENT NOT CARRIED OUT DUE TO PATIENT LEAVING PRIOR TO BEING SEEN BY HEALTH CARE PROVIDER: ICD-10-CM

## 2025-01-02 DIAGNOSIS — I50.23 ACUTE ON CHRONIC SYSTOLIC (CONGESTIVE) HEART FAILURE: ICD-10-CM

## 2025-01-02 DIAGNOSIS — E78.5 HYPERLIPIDEMIA, UNSPECIFIED: ICD-10-CM

## 2025-01-02 LAB
ALBUMIN SERPL ELPH-MCNC: 2.9 G/DL — LOW (ref 3.5–5.2)
ALP SERPL-CCNC: 77 U/L — SIGNIFICANT CHANGE UP (ref 30–115)
ALT FLD-CCNC: 458 U/L — HIGH (ref 0–41)
ANION GAP SERPL CALC-SCNC: 13 MMOL/L — SIGNIFICANT CHANGE UP (ref 7–14)
AST SERPL-CCNC: >700 U/L — HIGH (ref 0–41)
BILIRUB SERPL-MCNC: 0.7 MG/DL — SIGNIFICANT CHANGE UP (ref 0.2–1.2)
BUN SERPL-MCNC: 55 MG/DL — HIGH (ref 10–20)
CALCIUM SERPL-MCNC: 8.5 MG/DL — SIGNIFICANT CHANGE UP (ref 8.4–10.5)
CHLORIDE SERPL-SCNC: 103 MMOL/L — SIGNIFICANT CHANGE UP (ref 98–110)
CO2 SERPL-SCNC: 26 MMOL/L — SIGNIFICANT CHANGE UP (ref 17–32)
CREAT SERPL-MCNC: 1.7 MG/DL — HIGH (ref 0.7–1.5)
EGFR: 43 ML/MIN/1.73M2 — LOW
GAS PNL BLDA: SIGNIFICANT CHANGE UP
GAS PNL BLDA: SIGNIFICANT CHANGE UP
GLUCOSE BLDC GLUCOMTR-MCNC: 105 MG/DL — HIGH (ref 70–99)
GLUCOSE BLDC GLUCOMTR-MCNC: 116 MG/DL — HIGH (ref 70–99)
GLUCOSE BLDC GLUCOMTR-MCNC: 154 MG/DL — HIGH (ref 70–99)
GLUCOSE BLDC GLUCOMTR-MCNC: 40 MG/DL — CRITICAL LOW (ref 70–99)
GLUCOSE BLDC GLUCOMTR-MCNC: 48 MG/DL — CRITICAL LOW (ref 70–99)
GLUCOSE BLDC GLUCOMTR-MCNC: 58 MG/DL — LOW (ref 70–99)
GLUCOSE BLDC GLUCOMTR-MCNC: 83 MG/DL — SIGNIFICANT CHANGE UP (ref 70–99)
GLUCOSE BLDC GLUCOMTR-MCNC: 90 MG/DL — SIGNIFICANT CHANGE UP (ref 70–99)
GLUCOSE BLDC GLUCOMTR-MCNC: 97 MG/DL — SIGNIFICANT CHANGE UP (ref 70–99)
GLUCOSE SERPL-MCNC: 63 MG/DL — LOW (ref 70–99)
HCT VFR BLD CALC: 46 % — SIGNIFICANT CHANGE UP (ref 42–52)
HGB BLD-MCNC: 14.9 G/DL — SIGNIFICANT CHANGE UP (ref 14–18)
MCHC RBC-ENTMCNC: 28.8 PG — SIGNIFICANT CHANGE UP (ref 27–31)
MCHC RBC-ENTMCNC: 32.4 G/DL — SIGNIFICANT CHANGE UP (ref 32–37)
MCV RBC AUTO: 88.8 FL — SIGNIFICANT CHANGE UP (ref 80–94)
MRSA PCR RESULT.: NEGATIVE — SIGNIFICANT CHANGE UP
MRSA PCR RESULT.: NEGATIVE — SIGNIFICANT CHANGE UP
NRBC # BLD: 0 /100 WBCS — SIGNIFICANT CHANGE UP (ref 0–0)
PLATELET # BLD AUTO: 375 K/UL — SIGNIFICANT CHANGE UP (ref 130–400)
PMV BLD: 11.1 FL — HIGH (ref 7.4–10.4)
POTASSIUM SERPL-MCNC: 4.2 MMOL/L — SIGNIFICANT CHANGE UP (ref 3.5–5)
POTASSIUM SERPL-SCNC: 4.2 MMOL/L — SIGNIFICANT CHANGE UP (ref 3.5–5)
PROT SERPL-MCNC: 5.3 G/DL — LOW (ref 6–8)
RBC # BLD: 5.18 M/UL — SIGNIFICANT CHANGE UP (ref 4.7–6.1)
RBC # FLD: 13.2 % — SIGNIFICANT CHANGE UP (ref 11.5–14.5)
SODIUM SERPL-SCNC: 142 MMOL/L — SIGNIFICANT CHANGE UP (ref 135–146)
WBC # BLD: 13.73 K/UL — HIGH (ref 4.8–10.8)
WBC # FLD AUTO: 13.73 K/UL — HIGH (ref 4.8–10.8)

## 2025-01-02 PROCEDURE — 99232 SBSQ HOSP IP/OBS MODERATE 35: CPT

## 2025-01-02 PROCEDURE — 99291 CRITICAL CARE FIRST HOUR: CPT

## 2025-01-02 RX ORDER — DEXTROSE MONOHYDRATE 25 G/50ML
50 INJECTION, SOLUTION INTRAVENOUS ONCE
Refills: 0 | Status: COMPLETED | OUTPATIENT
Start: 2025-01-02 | End: 2025-01-02

## 2025-01-02 RX ORDER — METOPROLOL TARTRATE 50 MG
75 TABLET ORAL EVERY 6 HOURS
Refills: 0 | Status: DISCONTINUED | OUTPATIENT
Start: 2025-01-02 | End: 2025-01-13

## 2025-01-02 RX ORDER — FENTANYL 75 UG/H
0.5 PATCH, EXTENDED RELEASE TRANSDERMAL
Qty: 2500 | Refills: 0 | Status: DISCONTINUED | OUTPATIENT
Start: 2025-01-02 | End: 2025-01-04

## 2025-01-02 RX ORDER — HEPARIN SODIUM 1000 [USP'U]/ML
5000 INJECTION, SOLUTION INTRAVENOUS; SUBCUTANEOUS EVERY 12 HOURS
Refills: 0 | Status: DISCONTINUED | OUTPATIENT
Start: 2025-01-02 | End: 2025-01-03

## 2025-01-02 RX ADMIN — DEXMEDETOMIDINE HYDROCHLORIDE 4.11 MICROGRAM(S)/KG/HR: 4 INJECTION, SOLUTION INTRAVENOUS at 06:28

## 2025-01-02 RX ADMIN — EZETIMIBE 10 MILLIGRAM(S): 10 TABLET ORAL at 11:18

## 2025-01-02 RX ADMIN — ACETAMINOPHEN 650 MILLIGRAM(S): 80 SOLUTION/ DROPS ORAL at 11:25

## 2025-01-02 RX ADMIN — ACETAMINOPHEN 650 MILLIGRAM(S): 80 SOLUTION/ DROPS ORAL at 01:04

## 2025-01-02 RX ADMIN — ACETAMINOPHEN 650 MILLIGRAM(S): 80 SOLUTION/ DROPS ORAL at 11:19

## 2025-01-02 RX ADMIN — DEXTROSE MONOHYDRATE 50 MILLILITER(S): 25 INJECTION, SOLUTION INTRAVENOUS at 21:15

## 2025-01-02 RX ADMIN — BUMETANIDE 2 MILLIGRAM(S): 2 TABLET ORAL at 19:35

## 2025-01-02 RX ADMIN — ATORVASTATIN CALCIUM 40 MILLIGRAM(S): 40 TABLET, FILM COATED ORAL at 20:37

## 2025-01-02 RX ADMIN — Medication 75 MILLIGRAM(S): at 17:55

## 2025-01-02 RX ADMIN — DEXTROSE MONOHYDRATE 50 MILLILITER(S): 25 INJECTION, SOLUTION INTRAVENOUS at 10:39

## 2025-01-02 RX ADMIN — DEXTROSE MONOHYDRATE 50 MILLILITER(S): 25 INJECTION, SOLUTION INTRAVENOUS at 07:41

## 2025-01-02 RX ADMIN — CHLORHEXIDINE GLUCONATE 1 APPLICATION(S): 1.2 RINSE ORAL at 05:49

## 2025-01-02 RX ADMIN — FENTANYL 4.11 MICROGRAM(S)/KG/HR: 75 PATCH, EXTENDED RELEASE TRANSDERMAL at 20:36

## 2025-01-02 RX ADMIN — DEXMEDETOMIDINE HYDROCHLORIDE 4.11 MICROGRAM(S)/KG/HR: 4 INJECTION, SOLUTION INTRAVENOUS at 23:00

## 2025-01-02 RX ADMIN — DEXMEDETOMIDINE HYDROCHLORIDE 4.11 MICROGRAM(S)/KG/HR: 4 INJECTION, SOLUTION INTRAVENOUS at 19:35

## 2025-01-02 RX ADMIN — CLOPIDOGREL BISULFATE 75 MILLIGRAM(S): 75 TABLET, FILM COATED ORAL at 11:18

## 2025-01-02 RX ADMIN — DEXMEDETOMIDINE HYDROCHLORIDE 4.11 MICROGRAM(S)/KG/HR: 4 INJECTION, SOLUTION INTRAVENOUS at 02:18

## 2025-01-02 RX ADMIN — Medication 100 MILLIGRAM(S): at 17:48

## 2025-01-02 RX ADMIN — FENOFIBRATE 145 MILLIGRAM(S): 48 TABLET ORAL at 11:18

## 2025-01-02 RX ADMIN — CHLORHEXIDINE GLUCONATE 15 MILLILITER(S): 1.2 RINSE ORAL at 17:55

## 2025-01-02 RX ADMIN — DEXMEDETOMIDINE HYDROCHLORIDE 4.11 MICROGRAM(S)/KG/HR: 4 INJECTION, SOLUTION INTRAVENOUS at 09:46

## 2025-01-02 RX ADMIN — CHLORHEXIDINE GLUCONATE 15 MILLILITER(S): 1.2 RINSE ORAL at 05:38

## 2025-01-02 RX ADMIN — AMIODARONE HYDROCHLORIDE 200 MILLIGRAM(S): 200 TABLET ORAL at 05:38

## 2025-01-02 RX ADMIN — Medication 150 MILLIGRAM(S): at 05:38

## 2025-01-02 RX ADMIN — BUMETANIDE 2 MILLIGRAM(S): 2 TABLET ORAL at 08:05

## 2025-01-02 RX ADMIN — HEPARIN SODIUM 5000 UNIT(S): 1000 INJECTION, SOLUTION INTRAVENOUS; SUBCUTANEOUS at 17:48

## 2025-01-02 RX ADMIN — Medication 75 MILLIGRAM(S): at 11:18

## 2025-01-02 RX ADMIN — Medication 100 MILLIGRAM(S): at 05:38

## 2025-01-02 NOTE — PROGRESS NOTE ADULT - ASSESSMENT
69-year-old male with past medical history of chronic HFrEF s/p ICD, AVR porcine, HTN, PAF s/p ablation, CAD s/p PCI, DM, HLD, COPD (no home O2), smoker presenting with worsening shortness of breath and weakness with concern for acute on chronic CHF exacerbation in the setting of persistent afib      acute respiratory failure / Acute on chronic CHF exacerbation in HFmrEF / fevers   69-year-old male with past medical history of chronic HFrEF s/p ICD, AVR porcine, HTN, PAF s/p ablation, CAD s/p PCI, DM, HLD, COPD (no home O2), smoker presenting with worsening shortness of breath and weakness with concern for acute on chronic CHF exacerbation in the setting of persistent afib      acute respiratory failure / Acute on chronic CHF exacerbation in HFmrEF / fevers     - continue IV Bumex   - amio, metoprolol, Plavix   - cardiology f/u please   - check repeat procal

## 2025-01-02 NOTE — PROGRESS NOTE ADULT - SUBJECTIVE AND OBJECTIVE BOX
----------Daily Progress Note----------    HISTORY OF PRESENT ILLNESS:  Patient is a 69y old Male who presents with a chief complaint of CHF exacerbation (02 Jan 2025 08:58)    Currently admitted to medicine with the primary diagnosis of CHF exacerbation       Today is hospital day 6d.     INTERVAL HOSPITAL COURSE / OVERNIGHT EVENTS:    Patient was examined and seen at bedside. This morning he is resting comfortably in bed and reports no new issues or overnight events.     Review of Systems: Otherwise unremarkable     <<<<<PAST MEDICAL & SURGICAL HISTORY>>>>>  CHF (congestive heart failure)    Diabetes    CAD (coronary artery disease)    Chronic obstructive pulmonary disease (COPD)    HTN (hypertension)    Stented coronary artery    Dyslipidemia    GERD (gastroesophageal reflux disease)    Afib    CVA (cerebral vascular accident)    H/O heart artery stent    Heart valve replaced  aortic    History of Nissen fundoplication      ALLERGIES  sulfa drugs (Unknown)      Home Medications:  amiodarone 200 mg oral tablet: 1 tab(s) orally once a day (27 Dec 2024 11:06)  apixaban 5 mg oral tablet: 1 tab(s) orally every 12 hours (26 Dec 2024 15:14)  atorvastatin 40 mg oral tablet: 1 tab(s) orally once a day (27 Dec 2024 11:11)  clopidogrel 75 mg oral tablet: 1 tab(s) orally once a day (26 Dec 2024 15:14)  Entresto 24 mg-26 mg oral tablet: 1 tab(s) orally 2 times a day (26 Dec 2024 15:14)  eszopiclone 3 mg oral tablet: 1 tab(s) orally once a day (at bedtime) (26 Dec 2024 15:14)  ezetimibe 10 mg oral tablet: 1 orally once a day (26 Dec 2024 15:14)  fenofibrate 145 mg oral tablet: 1 orally once a day (26 Dec 2024 15:14)  furosemide 40 mg oral tablet: 1 orally once a day (26 Dec 2024 15:14)  metFORMIN 500 mg oral tablet: 1 tab(s) orally 2 times a day (26 Dec 2024 15:14)  metoprolol succinate 200 mg oral capsule, extended release: 1 cap(s) orally once a day (27 Dec 2024 11:07)  Trulicity Pen 1.5 mg/0.5 mL subcutaneous solution: 1.5 milligram(s) subcutaneously once a week (26 Dec 2024 15:14)        MEDICATIONS  STANDING MEDICATIONS  aMIOdarone    Tablet 200 milliGRAM(s) Oral daily  atorvastatin 40 milliGRAM(s) Oral at bedtime  buMETAnide Injectable 2 milliGRAM(s) IV Push every 12 hours  buMETAnide Injectable 2 milliGRAM(s) IV Push once  cefepime   IVPB      cefepime   IVPB 2000 milliGRAM(s) IV Intermittent every 12 hours  chlorhexidine 0.12% Liquid 15 milliLiter(s) Oral Mucosa every 12 hours  chlorhexidine 2% Cloths 1 Application(s) Topical <User Schedule>  clopidogrel Tablet 75 milliGRAM(s) Oral daily  dexMEDEtomidine Infusion 0.2 MICROgram(s)/kG/Hr IV Continuous <Continuous>  dextrose 5%. 1000 milliLiter(s) IV Continuous <Continuous>  dextrose 50% Injectable 25 Gram(s) IV Push once  dextrose 50% Injectable 12.5 Gram(s) IV Push once  dextrose 50% Injectable 25 Gram(s) IV Push once  ezetimibe 10 milliGRAM(s) Oral daily  fenofibrate Tablet 145 milliGRAM(s) Oral daily  fentaNYL   Infusion. 0.5 MICROgram(s)/kG/Hr IV Continuous <Continuous>  glucagon  Injectable 1 milliGRAM(s) IntraMuscular once  heparin   Injectable 5000 Unit(s) SubCutaneous every 12 hours  insulin glargine Injectable (LANTUS) 10 Unit(s) SubCutaneous at bedtime  insulin lispro (ADMELOG) corrective regimen sliding scale   SubCutaneous three times a day before meals  metoprolol tartrate 75 milliGRAM(s) Oral every 6 hours  norepinephrine Infusion 0.05 MICROgram(s)/kG/Min IV Continuous <Continuous>    PRN MEDICATIONS  acetaminophen     Tablet .. 650 milliGRAM(s) Oral every 6 hours PRN  albuterol/ipratropium for Nebulization 3 milliLiter(s) Nebulizer every 6 hours PRN  aluminum hydroxide/magnesium hydroxide/simethicone Suspension 30 milliLiter(s) Oral every 4 hours PRN  dextrose Oral Gel 15 Gram(s) Oral once PRN  hydrOXYzine hydrochloride 25 milliGRAM(s) Oral every 6 hours PRN  melatonin 3 milliGRAM(s) Oral at bedtime PRN  ondansetron Injectable 4 milliGRAM(s) IV Push every 8 hours PRN  zolpidem 5 milliGRAM(s) Oral at bedtime PRN    VITALS:  T(F): 100.8  HR: 87  BP: 145/88  RR: 21  SpO2: 100%    <<<<<LABS>>>>>                        14.9   13.73 )-----------( 375      ( 02 Jan 2025 06:00 )             46.0     01-02    142  |  103  |  55[H]  ----------------------------<  63[L]  4.2   |  26  |  1.7[H]    Ca    8.5      02 Jan 2025 06:00  Mg     2.6     01-01    TPro  5.3[L]  /  Alb  2.9[L]  /  TBili  0.7  /  DBili  x   /  AST  >700[H]  /  ALT  458[H]  /  AlkPhos  77  01-02    PT/INR - ( 31 Dec 2024 15:45 )   PT: 32.20 sec;   INR: 2.67 ratio         PTT - ( 31 Dec 2024 15:45 )  PTT:37.5 sec  Urinalysis Basic - ( 02 Jan 2025 06:00 )    Color: x / Appearance: x / SG: x / pH: x  Gluc: 63 mg/dL / Ketone: x  / Bili: x / Urobili: x   Blood: x / Protein: x / Nitrite: x   Leuk Esterase: x / RBC: x / WBC x   Sq Epi: x / Non Sq Epi: x / Bacteria: x      ABG - ( 02 Jan 2025 04:30 )  pH, Arterial: 7.53  pH, Blood: x     /  pCO2: 36    /  pO2: 130   / HCO3: 30    / Base Excess: 7.2   /  SaO2: 98.5                  Culture - Blood (collected 31 Dec 2024 15:45)  Source: .Blood BLOOD  Preliminary Report (02 Jan 2025 02:02):    No growth at 24 hours    [id number]        <<<<<RADIOLOGY>>>>>    <<<<<PHYSICAL EXAM>>>>>  GENERAL: intubated  HEENT: Normocephalic, atraumatic  PULMONARY: rales b/l  CARDIOVASCULAR: Regular rate and rhythm, S1-S2, no murmurs  GASTROINTESTINAL: Soft, non-tender, non-distended  SKIN/EXTREMITIES: b/l le edema  NEUROLOGIC/MUSCULOSKELETAL: intubated, sedated on SAT, able to follow commands, nodding yes and no to questions      -----------------------------------------------------------------------------------------------------------------------------------------------------------------------------------------------

## 2025-01-02 NOTE — PROGRESS NOTE ADULT - SUBJECTIVE AND OBJECTIVE BOX
Patient seen and evaluated this am, sedated on ventilator with Precedex      T(F): 100.8 (01-02-25 @ 12:00), Max: 100.8 (01-02-25 @ 10:00)  HR: 87 (01-02-25 @ 12:00)  BP: 145/88 (01-02-25 @ 12:00)  RR: 21 (01-02-25 @ 12:00)  SpO2: 100% (01-02-25 @ 12:00) (97% - 100%)    PHYSICAL EXAM:  GENERAL: NAD  HEAD:  Atraumatic, Normocephalic  EYES: EOMI, PERRLA, conjunctiva and sclera clear  NERVOUS SYSTEM: no focal deficits   CHEST/LUNG:  bilateral rhonchi  HEART: Regular rate and rhythm; No murmurs, rubs, or gallops  ABDOMEN: Soft, Nontender, Nondistended; Bowel sounds present  EXTREMITIES:  2+ Peripheral Pulses, No clubbing, cyanosis, or edema    LABS  01-02    142  |  103  |  55[H]  ----------------------------<  63[L]  4.2   |  26  |  1.7[H]    Ca    8.5      02 Jan 2025 06:00  Mg     2.6     01-01    TPro  5.3[L]  /  Alb  2.9[L]  /  TBili  0.7  /  DBili  x   /  AST  >700[H]  /  ALT  458[H]  /  AlkPhos  77  01-02                          14.9   13.73 )-----------( 375      ( 02 Jan 2025 06:00 )             46.0     Procalcitonin (12.27.24 @ 12:18)   Procalcitonin: 0.04    PT/INR - ( 31 Dec 2024 15:45 )   PT: 32.20 sec;   INR: 2.67 ratio         PTT - ( 31 Dec 2024 15:45 )  PTT:37.5 sec    Mode: AC/ CMV (Assist Control/ Continuous Mandatory Ventilation)  RR (machine): 220  TV (machine): 420  FiO2: 40  PEEP: 8      < from: TTE Echo Complete w/o Contrast w/ Doppler (12.27.24 @ 12:57) >    Summary:   1. Left ventricular ejection fraction, by visual estimation, is 40 to   45%.   2. Mildly enlarged left atrium.   3. Mild mitral annular calcification.   4. Mild mitral valve regurgitation.   5. Mild tricuspid regurgitation.   6. Bioprosthesis in the aortic position.   7. Ascending aorta 3.7 cm.   8. Estimated pulmonary artery systolic pressure is 35.7 mmHg assuming a   right atrial pressure of 3 mmHg, which is consistent with borderline   pulmonary hypertension.    < end of copied text >  < from: Transesophageal Echocardiogram (10.15.24 @ 12:18) >        Summary:   1. Moderately decreased global left ventricular systolic function. Left   ventricular ejection fraction, by visual estimation, is 30 to 35%.   2. Mildly enlarged right ventricle. Moderately reduced RV systolic   function.   3. Moderate to severe left atrial enlargement.   4. Bioprosthetic aortic valve well seated with adequate expanstion.   Normal opening. No paravalvular leak.   5. Mild mitral valve regurgitation.   6. Mild tricuspid regurgitation.   7. No left atrial appendage thrombus and decreased left atrial appendage   velocities.   8. After probe removal patient underwent successful synchronized   cardioversion with 200J.    < end of copied text >    Culture Results:   No growth at 24 hours (12-31-24)    RADIOLOGY  < from: Xray Chest 1 View- PORTABLE-Urgent (Xray Chest 1 View- PORTABLE-Urgent .) (01.01.25 @ 19:44) >    IMPRESSION: Low lung volume.    Bilateral opacities, slightly improved.    < end of copied text >    MEDICATIONS  (STANDING):  aMIOdarone    Tablet 200 milliGRAM(s) Oral daily  atorvastatin 40 milliGRAM(s) Oral at bedtime  buMETAnide Injectable 2 milliGRAM(s) IV Push every 12 hours  cefepime   IVPB 2000 milliGRAM(s) IV Intermittent every 12 hours  chlorhexidine 0.12% Liquid 15 milliLiter(s) Oral Mucosa every 12 hours  chlorhexidine 2% Cloths 1 Application(s) Topical <User Schedule>  clopidogrel Tablet 75 milliGRAM(s) Oral daily  dexMEDEtomidine Infusion 0.2 MICROgram(s)/kG/Hr (4.11 mL/Hr) IV Continuous <Continuous>  ezetimibe 10 milliGRAM(s) Oral daily  fenofibrate Tablet 145 milliGRAM(s) Oral daily  fentaNYL   Infusion. 0.5 MICROgram(s)/kG/Hr (4.11 mL/Hr) IV Continuous <Continuous>  heparin   Injectable 5000 Unit(s) SubCutaneous every 12 hours  insulin glargine Injectable (LANTUS) 10 Unit(s) SubCutaneous at bedtime  insulin lispro (ADMELOG) corrective regimen sliding scale   SubCutaneous three times a day before meals  metoprolol tartrate 75 milliGRAM(s) Oral every 6 hours  norepinephrine Infusion 0.05 MICROgram(s)/kG/Min (7.7 mL/Hr) IV Continuous <Continuous>    MEDICATIONS  (PRN):  acetaminophen     Tablet .. 650 milliGRAM(s) Oral every 6 hours PRN Temp greater or equal to 38C (100.4F), Mild Pain (1 - 3)  albuterol/ipratropium for Nebulization 3 milliLiter(s) Nebulizer every 6 hours PRN Shortness of Breath and/or Wheezing  aluminum hydroxide/magnesium hydroxide/simethicone Suspension 30 milliLiter(s) Oral every 4 hours PRN Dyspepsia  dextrose Oral Gel 15 Gram(s) Oral once PRN Blood Glucose LESS THAN 70 milliGRAM(s)/deciliter  hydrOXYzine hydrochloride 25 milliGRAM(s) Oral every 6 hours PRN Agitation  melatonin 3 milliGRAM(s) Oral at bedtime PRN Insomnia  ondansetron Injectable 4 milliGRAM(s) IV Push every 8 hours PRN Nausea and/or Vomiting  zolpidem 5 milliGRAM(s) Oral at bedtime PRN Insomnia

## 2025-01-02 NOTE — PROGRESS NOTE ADULT - ASSESSMENT
69-year-old male with past medical history of chronic HFrEF s/p ICD, AVR porcine, HTN, PAF s/p ablation, CAD s/p PCI, DM, HLD, COPD (no home O2), smoker presenting with worsening shortness of breath and weakness with concern for acute on chronic CHF exacerbation in the setting of persistent afib      #Acute hypoxemic respiratory failure  #Pulmonary edema  -intubated, sedated  -sat, sbt daily    #Acute on chronic CHF exacerbation in HFmrEF  #Persistent Atrial fibrillation w/ RVR  - Lasix 40mg IV BID  - bb  - entresto    #AMS  #H/o LT MCA CVA  - CT head unchanged    #CAP, mild  - on cefepime    #CAD  -C/w plavix, Bb and lipitor    #DM  -ssi  -Hold metformin    #COPD   - LABA/ICS    #Hyperlipidemia  - tricor, zetia, lipitor

## 2025-01-02 NOTE — PROGRESS NOTE ADULT - SUBJECTIVE AND OBJECTIVE BOX
Patient is a 69y old  Male who presents with a chief complaint of Other heart failure     (01 Jan 2025 19:42)        Over Night Events:  Remains on MV. Levophed 0.1. precedex 1.5. Tmax 100.7 noted, now on cooling blanket.       ROS:     All ROS are negative except HPI         PHYSICAL EXAM    ICU Vital Signs Last 24 Hrs  T(C): 37.9 (02 Jan 2025 07:01), Max: 38.2 (02 Jan 2025 00:00)  T(F): 100.2 (02 Jan 2025 07:01), Max: 100.7 (02 Jan 2025 00:00)  HR: 84 (02 Jan 2025 08:00) (70 - 94)  BP: 135/78 (02 Jan 2025 08:00) (88/55 - 135/78)  BP(mean): 102 (02 Jan 2025 08:00) (66 - 102)  ABP: --  ABP(mean): --  RR: 20 (02 Jan 2025 08:00) (20 - 27)  SpO2: 100% (02 Jan 2025 08:00) (97% - 100%)    O2 Parameters below as of 02 Jan 2025 06:00  Patient On (Oxygen Delivery Method): ventilator    O2 Concentration (%): 40        CONSTITUTIONAL:  in NAD    ENT:   Airway patent,   Mouth with normal mucosa.   No thrush    EYES:   Pupils equal,   Round and reactive to light.    CARDIAC:   Normal rate,   Regular rhythm.    No edema    Vascular:  Normal systolic impulse  No Carotid bruits    RESPIRATORY:   Bilateral BS  Normal chest expansion  Not tachypneic,  No use of accessory muscles    GASTROINTESTINAL:  Abdomen soft,   Non-tender,   No guarding,   + BS    MUSCULOSKELETAL:   Range of motion is not limited,  No clubbing, cyanosis    NEUROLOGICAL:   Alert  Follows commands     SKIN:   Skin normal color for race,   Warm and dry and intact.   No evidence of rash.    PSYCHIATRIC:   Normal mood and affect.   No apparent risk to self or others.    HEMATOLOGICAL:  No cervical  lymphadenopathy.  no inguinal lymphadenopathy      01-01-25 @ 07:01  -  01-02-25 @ 07:00  --------------------------------------------------------  IN:    Dexmedetomidine: 339 mL    FentaNYL: 36.9 mL    IV PiggyBack: 100 mL    Norepinephrine: 277.2 mL  Total IN: 753.1 mL    OUT:    Indwelling Catheter - Urethral (mL): 2120 mL  Total OUT: 2120 mL    Total NET: -1366.9 mL      01-02-25 @ 07:01  -  01-02-25 @ 08:47  --------------------------------------------------------  IN:    Dexmedetomidine: 92.4 mL    Norepinephrine: 46.2 mL  Total IN: 138.6 mL    OUT:    Indwelling Catheter - Urethral (mL): 200 mL  Total OUT: 200 mL    Total NET: -61.4 mL          LABS:                            14.9   13.73 )-----------( 375      ( 02 Jan 2025 06:00 )             46.0                                               01-02    142  |  103  |  55[H]  ----------------------------<  63[L]  4.2   |  26  |  1.7[H]    Ca    8.5      02 Jan 2025 06:00  Mg     2.6     01-01    TPro  5.3[L]  /  Alb  2.9[L]  /  TBili  0.7  /  DBili  x   /  AST  >700[H]  /  ALT  458[H]  /  AlkPhos  77  01-02      PT/INR - ( 31 Dec 2024 15:45 )   PT: 32.20 sec;   INR: 2.67 ratio         PTT - ( 31 Dec 2024 15:45 )  PTT:37.5 sec                                       Urinalysis Basic - ( 02 Jan 2025 06:00 )    Color: x / Appearance: x / SG: x / pH: x  Gluc: 63 mg/dL / Ketone: x  / Bili: x / Urobili: x   Blood: x / Protein: x / Nitrite: x   Leuk Esterase: x / RBC: x / WBC x   Sq Epi: x / Non Sq Epi: x / Bacteria: x                                                  LIVER FUNCTIONS - ( 02 Jan 2025 06:00 )  Alb: 2.9 g/dL / Pro: 5.3 g/dL / ALK PHOS: 77 U/L / ALT: 458 U/L / AST: >700 U/L / GGT: x                                                  Culture - Blood (collected 31 Dec 2024 15:45)  Source: .Blood BLOOD  Preliminary Report (02 Jan 2025 02:02):    No growth at 24 hours                                                   Mode: AC/ CMV (Assist Control/ Continuous Mandatory Ventilation)  RR (machine): 20  TV (machine): 420  FiO2: 40  PEEP: 8  ITime: 1  MAP: 12  PIP: 24                                      ABG - ( 02 Jan 2025 04:30 )  pH, Arterial: 7.53  pH, Blood: x     /  pCO2: 36    /  pO2: 130   / HCO3: 30    / Base Excess: 7.2   /  SaO2: 98.5                MEDICATIONS  (STANDING):  aMIOdarone    Tablet 200 milliGRAM(s) Oral daily  atorvastatin 40 milliGRAM(s) Oral at bedtime  buMETAnide Injectable 2 milliGRAM(s) IV Push once  buMETAnide Injectable 2 milliGRAM(s) IV Push every 12 hours  cefepime   IVPB      cefepime   IVPB 2000 milliGRAM(s) IV Intermittent every 12 hours  chlorhexidine 0.12% Liquid 15 milliLiter(s) Oral Mucosa every 12 hours  chlorhexidine 2% Cloths 1 Application(s) Topical <User Schedule>  clopidogrel Tablet 75 milliGRAM(s) Oral daily  dexMEDEtomidine Infusion 0.2 MICROgram(s)/kG/Hr (4.11 mL/Hr) IV Continuous <Continuous>  dextrose 5%. 1000 milliLiter(s) (50 mL/Hr) IV Continuous <Continuous>  dextrose 50% Injectable 25 Gram(s) IV Push once  dextrose 50% Injectable 12.5 Gram(s) IV Push once  dextrose 50% Injectable 25 Gram(s) IV Push once  ezetimibe 10 milliGRAM(s) Oral daily  fenofibrate Tablet 145 milliGRAM(s) Oral daily  fentaNYL   Infusion. 0.5 MICROgram(s)/kG/Hr (4.11 mL/Hr) IV Continuous <Continuous>  glucagon  Injectable 1 milliGRAM(s) IntraMuscular once  insulin glargine Injectable (LANTUS) 10 Unit(s) SubCutaneous at bedtime  insulin lispro (ADMELOG) corrective regimen sliding scale   SubCutaneous three times a day before meals  metoprolol tartrate 150 milliGRAM(s) Oral two times a day  norepinephrine Infusion 0.05 MICROgram(s)/kG/Min (7.7 mL/Hr) IV Continuous <Continuous>    MEDICATIONS  (PRN):  acetaminophen     Tablet .. 650 milliGRAM(s) Oral every 6 hours PRN Temp greater or equal to 38C (100.4F), Mild Pain (1 - 3)  albuterol/ipratropium for Nebulization 3 milliLiter(s) Nebulizer every 6 hours PRN Shortness of Breath and/or Wheezing  aluminum hydroxide/magnesium hydroxide/simethicone Suspension 30 milliLiter(s) Oral every 4 hours PRN Dyspepsia  dextrose Oral Gel 15 Gram(s) Oral once PRN Blood Glucose LESS THAN 70 milliGRAM(s)/deciliter  hydrOXYzine hydrochloride 25 milliGRAM(s) Oral every 6 hours PRN Agitation  melatonin 3 milliGRAM(s) Oral at bedtime PRN Insomnia  ondansetron Injectable 4 milliGRAM(s) IV Push every 8 hours PRN Nausea and/or Vomiting  zolpidem 5 milliGRAM(s) Oral at bedtime PRN Insomnia      New X-rays reviewed:                                                                                  ECHO   Patient is a 69y old  Male who presents with a chief complaint of Other heart failure     (01 Jan 2025 19:42)        Over Night Events:  Remains on MV. Levophed 0.1. precedex 1.5. Tmax 100.7 noted, now on cooling blanket.     Vital Signs Last 24 Hrs  T(C): 37.9 (02 Jan 2025 07:01), Max: 38.2 (02 Jan 2025 00:00)  T(F): 100.2 (02 Jan 2025 07:01), Max: 100.7 (02 Jan 2025 00:00)  HR: 84 (02 Jan 2025 08:00) (70 - 94)  BP: 135/78 (02 Jan 2025 08:00) (88/55 - 135/78)  BP(mean): 102 (02 Jan 2025 08:00) (66 - 102)  RR: 20 (02 Jan 2025 08:00) (20 - 27)  SpO2: 100% (02 Jan 2025 08:00) (97% - 100%)    O2 Parameters below as of 02 Jan 2025 06:00  Patient On (Oxygen Delivery Method): ventilator    O2 Concentration (%): 40        CONSTITUTIONAL:  Ill appearing  in NAD    ENT:   Airway patent,   Mouth with normal mucosa.   No thrush    EYES:   Pupils equal,   Round and reactive to light.    CARDIAC:   irregular    RESPIRATORY:   Bilateral BS  Normal chest expansion  Not tachypneic,  No use of accessory muscles    GASTROINTESTINAL:  Abdomen soft,   Non-tender,   No guarding,   + BS    MUSCULOSKELETAL:   Range of motion is not limited    NEUROLOGICAL:   Alert  Follows commands     SKIN:   Skin normal color for race,   Warm and dry and intact      01-01-25 @ 07:01  -  01-02-25 @ 07:00  --------------------------------------------------------  IN:    Dexmedetomidine: 339 mL    FentaNYL: 36.9 mL    IV PiggyBack: 100 mL    Norepinephrine: 277.2 mL  Total IN: 753.1 mL    OUT:    Indwelling Catheter - Urethral (mL): 2120 mL  Total OUT: 2120 mL    Total NET: -1366.9 mL      01-02-25 @ 07:01  -  01-02-25 @ 08:47  --------------------------------------------------------  IN:    Dexmedetomidine: 92.4 mL    Norepinephrine: 46.2 mL  Total IN: 138.6 mL    OUT:    Indwelling Catheter - Urethral (mL): 200 mL  Total OUT: 200 mL    Total NET: -61.4 mL          LABS:                            14.9   13.73 )-----------( 375      ( 02 Jan 2025 06:00 )             46.0                                               01-02    142  |  103  |  55[H]  ----------------------------<  63[L]  4.2   |  26  |  1.7[H]    Ca    8.5      02 Jan 2025 06:00  Mg     2.6     01-01    TPro  5.3[L]  /  Alb  2.9[L]  /  TBili  0.7  /  DBili  x   /  AST  >700[H]  /  ALT  458[H]  /  AlkPhos  77  01-02      PT/INR - ( 31 Dec 2024 15:45 )   PT: 32.20 sec;   INR: 2.67 ratio         PTT - ( 31 Dec 2024 15:45 )  PTT:37.5 sec                                       Urinalysis Basic - ( 02 Jan 2025 06:00 )    Color: x / Appearance: x / SG: x / pH: x  Gluc: 63 mg/dL / Ketone: x  / Bili: x / Urobili: x   Blood: x / Protein: x / Nitrite: x   Leuk Esterase: x / RBC: x / WBC x   Sq Epi: x / Non Sq Epi: x / Bacteria: x                                                  LIVER FUNCTIONS - ( 02 Jan 2025 06:00 )  Alb: 2.9 g/dL / Pro: 5.3 g/dL / ALK PHOS: 77 U/L / ALT: 458 U/L / AST: >700 U/L / GGT: x                                                  Culture - Blood (collected 31 Dec 2024 15:45)  Source: .Blood BLOOD  Preliminary Report (02 Jan 2025 02:02):    No growth at 24 hours                                                   Mode: AC/ CMV (Assist Control/ Continuous Mandatory Ventilation)  RR (machine): 20  TV (machine): 420  FiO2: 40  PEEP: 8  ITime: 1  MAP: 12  PIP: 24                                      ABG - ( 02 Jan 2025 04:30 )  pH, Arterial: 7.53  pH, Blood: x     /  pCO2: 36    /  pO2: 130   / HCO3: 30    / Base Excess: 7.2   /  SaO2: 98.5                MEDICATIONS  (STANDING):  aMIOdarone    Tablet 200 milliGRAM(s) Oral daily  atorvastatin 40 milliGRAM(s) Oral at bedtime  buMETAnide Injectable 2 milliGRAM(s) IV Push once  buMETAnide Injectable 2 milliGRAM(s) IV Push every 12 hours  cefepime   IVPB      cefepime   IVPB 2000 milliGRAM(s) IV Intermittent every 12 hours  chlorhexidine 0.12% Liquid 15 milliLiter(s) Oral Mucosa every 12 hours  chlorhexidine 2% Cloths 1 Application(s) Topical <User Schedule>  clopidogrel Tablet 75 milliGRAM(s) Oral daily  dexMEDEtomidine Infusion 0.2 MICROgram(s)/kG/Hr (4.11 mL/Hr) IV Continuous <Continuous>  dextrose 5%. 1000 milliLiter(s) (50 mL/Hr) IV Continuous <Continuous>  dextrose 50% Injectable 25 Gram(s) IV Push once  dextrose 50% Injectable 12.5 Gram(s) IV Push once  dextrose 50% Injectable 25 Gram(s) IV Push once  ezetimibe 10 milliGRAM(s) Oral daily  fenofibrate Tablet 145 milliGRAM(s) Oral daily  fentaNYL   Infusion. 0.5 MICROgram(s)/kG/Hr (4.11 mL/Hr) IV Continuous <Continuous>  glucagon  Injectable 1 milliGRAM(s) IntraMuscular once  insulin glargine Injectable (LANTUS) 10 Unit(s) SubCutaneous at bedtime  insulin lispro (ADMELOG) corrective regimen sliding scale   SubCutaneous three times a day before meals  metoprolol tartrate 150 milliGRAM(s) Oral two times a day  norepinephrine Infusion 0.05 MICROgram(s)/kG/Min (7.7 mL/Hr) IV Continuous <Continuous>    MEDICATIONS  (PRN):  acetaminophen     Tablet .. 650 milliGRAM(s) Oral every 6 hours PRN Temp greater or equal to 38C (100.4F), Mild Pain (1 - 3)  albuterol/ipratropium for Nebulization 3 milliLiter(s) Nebulizer every 6 hours PRN Shortness of Breath and/or Wheezing  aluminum hydroxide/magnesium hydroxide/simethicone Suspension 30 milliLiter(s) Oral every 4 hours PRN Dyspepsia  dextrose Oral Gel 15 Gram(s) Oral once PRN Blood Glucose LESS THAN 70 milliGRAM(s)/deciliter  hydrOXYzine hydrochloride 25 milliGRAM(s) Oral every 6 hours PRN Agitation  melatonin 3 milliGRAM(s) Oral at bedtime PRN Insomnia  ondansetron Injectable 4 milliGRAM(s) IV Push every 8 hours PRN Nausea and/or Vomiting  zolpidem 5 milliGRAM(s) Oral at bedtime PRN Insomnia

## 2025-01-02 NOTE — PROGRESS NOTE ADULT - ASSESSMENT
This is a 69-year-old male with past medical history of chronic HFrEF s/p ICD, AVR porcine, HTN, PAF s/p ablation, CAD s/p PCI, DM, HLD, COPD (no home O2), smoker presenting with worsening shortness of breath and weakness.    IMPRESSION  #Acute hypoxic respiratory failure   Likely from V-Tach and heart failure exacerbation. Cardiogenic shock  #High Fevers- Perhaps Aspirational pneumonitis  #Leukocytosis-Likely reactive.   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  Blood cultures NGTD    RECOMMENDATIONS  -Unclear why was the patient on abx initially.   -Follow up with MRSA nared. Collect Sputum cultures.  -US liver and Kidneys  -Hold further Vanc.  -IV Cefepime 2 gram q12 hours. (S/D 12/31)  -Cardiology/EP follow up.   -Off loading to prevent pressure sores and preventive measures to avoid aspiration. TOV when able.     If any questions, please send a message or call on [employer] Teams  Please continue to update ID with any pertinent new laboratory or radiographic findings.    Benigno Pond M.D  Infectious Diseases Attending/   [employer] at [facility]   This is a 69-year-old male with past medical history of chronic HFrEF s/p ICD, AVR porcine, HTN, PAF s/p ablation, CAD s/p PCI, DM, HLD, COPD (no home O2), smoker presenting with worsening shortness of breath and weakness.    IMPRESSION  #Acute hypoxic respiratory failure   Likely from V-Tach and heart failure exacerbation. Cardiogenic shock  #High Fevers- Perhaps Aspirational pneumonitis  #Leukocytosis-Likely reactive.   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  Blood cultures NGTD  MRSA nares negative    RECOMMENDATIONS  -Unclear why was the patient on abx initially.   -Collect Sputum cultures.  -US liver and Kidneys  -IV Cefepime 2 gram q12 hours for now. (S/D 12/31)  -Cardiology/EP follow up.   -Off loading to prevent pressure sores and preventive measures to avoid aspiration. TOV when able.     If any questions, please send a message or call on [employer] Teams  Please continue to update ID with any pertinent new laboratory or radiographic findings.    Benigno Pond M.D  Infectious Diseases Attending/   [employer] at [facility] [place]

## 2025-01-02 NOTE — PROGRESS NOTE ADULT - SUBJECTIVE AND OBJECTIVE BOX
TOSINJAMEEMONIQUE CHRISTINE  69y, Male    LOS  6d    INTERVAL EVENTS/HPI  - T(F): , Max: 100.7 (01-02-25 @ 00:00)  - WBC Count: 13.73 (01-02-25 @ 06:00)  WBC Count: 12.45 (01-01-25 @ 05:55)  - Creatinine: 1.7 (01-02-25 @ 06:00)  Creatinine: 1.9 (01-01-25 @ 19:31)     REVIEW OF SYSTEMS: cannot obtain further history from the patient secondary to altered mental status or sedation      Prior hospital charts reviewed [Yes]  Primary team notes reviewed [Yes]  Other consultant notes reviewed [Yes]    ANTIMICROBIALS:   cefepime   IVPB    cefepime   IVPB 2000 every 12 hours      OTHER MEDS: MEDICATIONS  (STANDING):  acetaminophen     Tablet .. 650 every 6 hours PRN  albuterol/ipratropium for Nebulization 3 every 6 hours PRN  aluminum hydroxide/magnesium hydroxide/simethicone Suspension 30 every 4 hours PRN  aMIOdarone    Tablet 200 daily  atorvastatin 40 at bedtime  buMETAnide Injectable 2 once  buMETAnide Injectable 2 every 12 hours  clopidogrel Tablet 75 daily  dexMEDEtomidine Infusion 0.2 <Continuous>  dextrose 50% Injectable 25 once  dextrose 50% Injectable 12.5 once  dextrose 50% Injectable 25 once  dextrose Oral Gel 15 once PRN  ezetimibe 10 daily  fenofibrate Tablet 145 daily  fentaNYL   Infusion. 0.5 <Continuous>  glucagon  Injectable 1 once  hydrOXYzine hydrochloride 25 every 6 hours PRN  insulin glargine Injectable (LANTUS) 10 at bedtime  insulin lispro (ADMELOG) corrective regimen sliding scale  three times a day before meals  melatonin 3 at bedtime PRN  metoprolol tartrate 150 two times a day  norepinephrine Infusion 0.05 <Continuous>  ondansetron Injectable 4 every 8 hours PRN  zolpidem 5 at bedtime PRN      Vital Signs Last 24 Hrs  T(F): 100.2 (01-02-25 @ 07:01), Max: 104.4 (12-31-24 @ 15:05)    Vital Signs Last 24 Hrs  HR: 84 (01-02-25 @ 08:00) (70 - 94)  BP: 135/78 (01-02-25 @ 08:00) (88/55 - 135/78)  RR: 20 (01-02-25 @ 08:00)  SpO2: 100% (01-02-25 @ 08:00) (97% - 100%)  Wt(kg): --    EXAM:  GENERAL: On MV  HEAD: No head lesions  NECK: Supple  CHEST/LUNG: Shallow breath sounds.   HEART: S1 S2  ABDOMEN: Soft, nontender, Distended.   EXTREMITIES: No clubbing, cyanosis, or petal edema  NERVOUS SYSTEM: On MV  MSK: No joint erythema, swelling or pain  SKIN: No rashes or lesions, no superficial thrombophlebitis  Labs:                        14.9   13.73 )-----------( 375      ( 02 Jan 2025 06:00 )             46.0     01-02    142  |  103  |  55[H]  ----------------------------<  63[L]  4.2   |  26  |  1.7[H]    Ca    8.5      02 Jan 2025 06:00  Mg     2.6     01-01    TPro  5.3[L]  /  Alb  2.9[L]  /  TBili  0.7  /  DBili  x   /  AST  >700[H]  /  ALT  458[H]  /  AlkPhos  77  01-02      WBC Trend:  WBC Count: 13.73 (01-02-25 @ 06:00)  WBC Count: 12.45 (01-01-25 @ 05:55)  WBC Count: 14.78 (12-31-24 @ 15:45)  WBC Count: 16.23 (12-31-24 @ 03:47)      Creatine Trend:  Creatinine: 1.7 (01-02)  Creatinine: 1.9 (01-01)  Creatinine: 2.3 (01-01)  Creatinine: 2.0 (12-31)      Liver Biochemical Testing Trend:  Alanine Aminotransferase (ALT/SGPT): 458 *H* (01-02)  Alanine Aminotransferase (ALT/SGPT): 229 *H* (01-01)  Alanine Aminotransferase (ALT/SGPT): 37 (12-31)  Alanine Aminotransferase (ALT/SGPT): 22 (12-31)  Alanine Aminotransferase (ALT/SGPT): 21 (12-30)  Aspartate Aminotransferase (AST/SGOT): >700 (01-02-25 @ 06:00)  Aspartate Aminotransferase (AST/SGOT): 412 (01-01-25 @ 05:55)  Aspartate Aminotransferase (AST/SGOT): 87 (12-31-24 @ 15:45)  Aspartate Aminotransferase (AST/SGOT): 27 (12-31-24 @ 03:47)  Aspartate Aminotransferase (AST/SGOT): 20 (12-30-24 @ 08:02)  Bilirubin Total: 0.7 (01-02)  Bilirubin Total: 0.9 (01-01)  Bilirubin Total: 1.3 (12-31)  Bilirubin Total: 0.8 (12-31)  Bilirubin Total: 0.8 (12-30)      Trend LDH      Urinalysis Basic - ( 02 Jan 2025 06:00 )    Color: x / Appearance: x / SG: x / pH: x  Gluc: 63 mg/dL / Ketone: x  / Bili: x / Urobili: x   Blood: x / Protein: x / Nitrite: x   Leuk Esterase: x / RBC: x / WBC x   Sq Epi: x / Non Sq Epi: x / Bacteria: x        MICROBIOLOGY:    Male    Culture - Blood (collected 31 Dec 2024 15:45)  Source: .Blood BLOOD  Preliminary Report:    No growth at 24 hours    Culture - Blood (collected 03 Sep 2023 12:50)  Source: .Blood Blood  Final Report:    No growth at 5 days    Culture - Blood (collected 03 Sep 2023 12:50)  Source: .Blood Blood  Final Report:    No growth at 5 days  Procalcitonin: 0.04 (12-27)  Troponin T, High Sensitivity Result: 42 (12-31)  Troponin T, High Sensitivity Result: 40 (12-31)  Troponin T, High Sensitivity Result: 49 (12-27)  Troponin T, High Sensitivity Result: 52 (12-27)  Troponin T, High Sensitivity Result: 56 (12-27)  Troponin T, High Sensitivity Result: 58 (12-26)  Troponin T, High Sensitivity Result: 64 (12-26)    Blood Gas Arterial, Lactate: 0.8 (01-02 @ 04:30)  Lactate, Blood: 2.3 (12-31 @ 15:45)  Blood Gas Arterial, Lactate: 1.4 (12-31 @ 14:23)  Blood Gas Arterial, Lactate: 1.7 (12-31 @ 03:36)        RADIOLOGY & ADDITIONAL TESTS:  I have personally reviewed the relevant images.   CXR  Xray Chest 1 View- PORTABLE-Urgent:   ACC: [id number] EXAM:  XR CHEST PORTABLE URGENT 1V   ORDERED BY: TOSHA NEVAREZ     PROCEDURE DATE:  01/01/2025          INTERPRETATION:  CLINICAL HISTORY: Follow-up.    COMPARISON: January 1, 2025.    TECHNIQUE: Portable frontal chest radiograph. Low lung volume.    FINDINGS:    Support devices: ETT with its tip above the oscar and NGT with its tip   below the diaphragm. Left-sided permanent pacemaker.    Cardiac/mediastinum/hilum: Stable.    Lung parenchyma/Pleura: Bilateral opacities, slightly improved. No   pneumothorax is seen.    Skeleton/soft tissues: Stable.    IMPRESSION: Low lung volume.    Bilateral opacities, slightly improved.    Support tubes as above.    Left-sided permanent pacemaker.    --- End of Report ---            AISLINN BERTRAND MD; Attending Radiologist  This document has been electronically signed. Jan 2 2025  4:29AM (01-01-25 @ 19:44)      CT      < from: TTE Echo Complete w/o Contrast w/ Doppler (12.27.24 @ 12:57) >    Summary:   1. Left ventricular ejection fraction, by visual estimation, is 40 to   45%.   2. Mildly enlarged left atrium.   3. Mild mitral annular calcification.   4. Mild mitral valve regurgitation.   5. Mild tricuspid regurgitation.   6. Bioprosthesis in the aortic position.   7. Ascending aorta 3.7 cm.   8. Estimated pulmonary artery systolic pressure is 35.7 mmHg assuming a   right atrial pressure of 3 mmHg, which is consistent with borderline   pulmonary hypertension.    < end of copied text >    WEIGHT  Weight (kg): 82.1 (12-27-24 @ 16:33)  Creatinine: 1.7 mg/dL (01-02-25 @ 06:00)  Creatinine: 1.9 mg/dL (01-01-25 @ 19:31)      All available historical records have been reviewed

## 2025-01-02 NOTE — PROGRESS NOTE ADULT - ASSESSMENT
IMPRESSION:    Acute Hypoxemic Respiratory Failure   Pulmonary Edema  Acute Decompensated HFmrEF   Toxic Metabolic Encephalopathy   Persistent AFIB awaiting Albation   KAREN - improving.   HO ICM s/p CRT-D  HO Bioprosthetic AV  HO CVA  HO DM   HO COPD     PLAN:    CNS: Sedation with fentanyl and precedex. Daily SAT. CT Head 12/31 noted, rEEG.    HEENT: Oral and ETT care. ETT day #3.     PULMONARY:  HOB @ 45 degrees.  Aspiration precautions ABG reviewed, decrease RR to 16., CXR with bilateral opacities. SBT daily.     CARDIOVASCULAR: Volume contraction as tolerated, optimize cardiac therapy, Bumex BID. Rate control, Cardiology follow up, TTE reviewed, proBNP noted. Wean levophed as tolerated, target SpO2 92-96%.     GI: GI prophylaxis. NPO for now.  Bowel regimen     RENAL:  Follow up lytes.  Correct as needed.  Consult nephrology. Monitor UO.     INFECTIOUS DISEASE: Follow up cultures, ABX per ID. RVP. UA.     HEMATOLOGICAL: Full AC, heparin gtt with KAREN. Monitor CBC and coags.     ENDOCRINE:  Follow up FS.  Insulin protocol if needed.    MUSCULOSKELETAL: bedrest for now    Goals of care     MICU monitoring  IMPRESSION:    Acute Hypoxemic Respiratory Failure   Pulmonary Edema  Acute Decompensated HFmrEF   Toxic Metabolic Encephalopathy   Persistent AFIB awaiting Albation   KAREN - improving.   HO ICM s/p CRT-D  HO Bioprosthetic AV  HO CVA  HO DM   HO COPD     PLAN:    CNS: Sedation with fentanyl and precedex. Daily SAT. CT Head 12/31 noted, rEEG.    HEENT: Oral and ETT care. ETT day #3.     PULMONARY:  HOB @ 45 degrees.  Aspiration precautions ABG reviewed, decrease RR to 16, , CXR with bilateral opacities. SBT daily.     CARDIOVASCULAR: Volume contraction as tolerated, optimize cardiac therapy, Bumex BID. Rate control, Cardiology follow up, TTE reviewed, proBNP noted. Wean levophed as tolerated, target SpO2 92-96%.     GI: GI prophylaxis. NPO for now.  Bowel regimen     RENAL:  Follow up lytes.  Correct as needed.  Consult nephrology. Monitor UO.     INFECTIOUS DISEASE: Follow up cultures, ABX per ID. RVP. UA.     HEMATOLOGICAL: Full AC, heparin gtt with KAREN. Monitor CBC and coags.     ENDOCRINE:  Follow up FS.  Insulin protocol if needed.    MUSCULOSKELETAL: bedrest for now    Goals of care     MICU monitoring

## 2025-01-03 LAB
ALBUMIN SERPL ELPH-MCNC: 2.8 G/DL — LOW (ref 3.5–5.2)
ALP SERPL-CCNC: 78 U/L — SIGNIFICANT CHANGE UP (ref 30–115)
ALT FLD-CCNC: 967 U/L — HIGH (ref 0–41)
ANION GAP SERPL CALC-SCNC: 15 MMOL/L — HIGH (ref 7–14)
APTT BLD: 48.6 SEC — HIGH (ref 27–39.2)
APTT BLD: >200 SEC — CRITICAL HIGH (ref 27–39.2)
AST SERPL-CCNC: 1585 U/L — HIGH (ref 0–41)
BILIRUB DIRECT SERPL-MCNC: 0.2 MG/DL — SIGNIFICANT CHANGE UP (ref 0–0.3)
BILIRUB INDIRECT FLD-MCNC: 0.4 MG/DL — SIGNIFICANT CHANGE UP (ref 0.2–1.2)
BILIRUB SERPL-MCNC: 0.6 MG/DL — SIGNIFICANT CHANGE UP (ref 0.2–1.2)
BUN SERPL-MCNC: 53 MG/DL — HIGH (ref 10–20)
CALCIUM SERPL-MCNC: 8.6 MG/DL — SIGNIFICANT CHANGE UP (ref 8.4–10.5)
CHLORIDE SERPL-SCNC: 103 MMOL/L — SIGNIFICANT CHANGE UP (ref 98–110)
CO2 SERPL-SCNC: 26 MMOL/L — SIGNIFICANT CHANGE UP (ref 17–32)
CREAT SERPL-MCNC: 1.8 MG/DL — HIGH (ref 0.7–1.5)
EGFR: 40 ML/MIN/1.73M2 — LOW
GAS PNL BLDA: SIGNIFICANT CHANGE UP
GLUCOSE BLDC GLUCOMTR-MCNC: 154 MG/DL — HIGH (ref 70–99)
GLUCOSE BLDC GLUCOMTR-MCNC: 160 MG/DL — HIGH (ref 70–99)
GLUCOSE BLDC GLUCOMTR-MCNC: 199 MG/DL — HIGH (ref 70–99)
GLUCOSE BLDC GLUCOMTR-MCNC: 228 MG/DL — HIGH (ref 70–99)
GLUCOSE BLDC GLUCOMTR-MCNC: 275 MG/DL — HIGH (ref 70–99)
GLUCOSE SERPL-MCNC: 161 MG/DL — HIGH (ref 70–99)
HCT VFR BLD CALC: 44.5 % — SIGNIFICANT CHANGE UP (ref 42–52)
HCT VFR BLD CALC: 45.4 % — SIGNIFICANT CHANGE UP (ref 42–52)
HGB BLD-MCNC: 14 G/DL — SIGNIFICANT CHANGE UP (ref 14–18)
HGB BLD-MCNC: 14.4 G/DL — SIGNIFICANT CHANGE UP (ref 14–18)
INR BLD: 1.82 RATIO — HIGH (ref 0.65–1.3)
MAGNESIUM SERPL-MCNC: 2.1 MG/DL — SIGNIFICANT CHANGE UP (ref 1.8–2.4)
MCHC RBC-ENTMCNC: 28.5 PG — SIGNIFICANT CHANGE UP (ref 27–31)
MCHC RBC-ENTMCNC: 28.7 PG — SIGNIFICANT CHANGE UP (ref 27–31)
MCHC RBC-ENTMCNC: 31.5 G/DL — LOW (ref 32–37)
MCHC RBC-ENTMCNC: 31.7 G/DL — LOW (ref 32–37)
MCV RBC AUTO: 89.7 FL — SIGNIFICANT CHANGE UP (ref 80–94)
MCV RBC AUTO: 91.4 FL — SIGNIFICANT CHANGE UP (ref 80–94)
NRBC # BLD: 0 /100 WBCS — SIGNIFICANT CHANGE UP (ref 0–0)
NRBC # BLD: 0 /100 WBCS — SIGNIFICANT CHANGE UP (ref 0–0)
PHOSPHATE SERPL-MCNC: 3.9 MG/DL — SIGNIFICANT CHANGE UP (ref 2.1–4.9)
PLATELET # BLD AUTO: 341 K/UL — SIGNIFICANT CHANGE UP (ref 130–400)
PLATELET # BLD AUTO: 383 K/UL — SIGNIFICANT CHANGE UP (ref 130–400)
PMV BLD: 11.2 FL — HIGH (ref 7.4–10.4)
PMV BLD: 11.4 FL — HIGH (ref 7.4–10.4)
POTASSIUM SERPL-MCNC: 4.2 MMOL/L — SIGNIFICANT CHANGE UP (ref 3.5–5)
POTASSIUM SERPL-SCNC: 4.2 MMOL/L — SIGNIFICANT CHANGE UP (ref 3.5–5)
PROT SERPL-MCNC: 5.2 G/DL — LOW (ref 6–8)
PROTHROM AB SERPL-ACNC: 21.8 SEC — HIGH (ref 9.95–12.87)
RAPID RVP RESULT: SIGNIFICANT CHANGE UP
RBC # BLD: 4.87 M/UL — SIGNIFICANT CHANGE UP (ref 4.7–6.1)
RBC # BLD: 5.06 M/UL — SIGNIFICANT CHANGE UP (ref 4.7–6.1)
RBC # FLD: 13.4 % — SIGNIFICANT CHANGE UP (ref 11.5–14.5)
RBC # FLD: 13.6 % — SIGNIFICANT CHANGE UP (ref 11.5–14.5)
SARS-COV-2 RNA SPEC QL NAA+PROBE: SIGNIFICANT CHANGE UP
SODIUM SERPL-SCNC: 144 MMOL/L — SIGNIFICANT CHANGE UP (ref 135–146)
WBC # BLD: 11.98 K/UL — HIGH (ref 4.8–10.8)
WBC # BLD: 13.35 K/UL — HIGH (ref 4.8–10.8)
WBC # FLD AUTO: 11.98 K/UL — HIGH (ref 4.8–10.8)
WBC # FLD AUTO: 13.35 K/UL — HIGH (ref 4.8–10.8)

## 2025-01-03 PROCEDURE — 93010 ELECTROCARDIOGRAM REPORT: CPT

## 2025-01-03 PROCEDURE — 71250 CT THORAX DX C-: CPT | Mod: 26

## 2025-01-03 PROCEDURE — 71045 X-RAY EXAM CHEST 1 VIEW: CPT | Mod: 26

## 2025-01-03 PROCEDURE — 76705 ECHO EXAM OF ABDOMEN: CPT | Mod: 26

## 2025-01-03 PROCEDURE — 74176 CT ABD & PELVIS W/O CONTRAST: CPT | Mod: 26

## 2025-01-03 PROCEDURE — 99291 CRITICAL CARE FIRST HOUR: CPT

## 2025-01-03 PROCEDURE — 99232 SBSQ HOSP IP/OBS MODERATE 35: CPT

## 2025-01-03 PROCEDURE — 99233 SBSQ HOSP IP/OBS HIGH 50: CPT

## 2025-01-03 RX ORDER — ZOLPIDEM TARTRATE 5 MG
5 TABLET ORAL AT BEDTIME
Refills: 0 | Status: DISCONTINUED | OUTPATIENT
Start: 2025-01-03 | End: 2025-01-10

## 2025-01-03 RX ORDER — POLYETHYLENE GLYCOL 3350 17 G/DOSE
17 POWDER (GRAM) ORAL
Refills: 0 | Status: DISCONTINUED | OUTPATIENT
Start: 2025-01-03 | End: 2025-01-16

## 2025-01-03 RX ORDER — BUMETANIDE 2 MG/1
2 TABLET ORAL DAILY
Refills: 0 | Status: DISCONTINUED | OUTPATIENT
Start: 2025-01-03 | End: 2025-01-05

## 2025-01-03 RX ORDER — IOHEXOL 350 MG/ML
30 INJECTION, SOLUTION INTRAVENOUS ONCE
Refills: 0 | Status: COMPLETED | OUTPATIENT
Start: 2025-01-03 | End: 2025-01-03

## 2025-01-03 RX ORDER — PROPOFOL 10 MG/ML
10 INJECTION, EMULSION INTRAVENOUS
Qty: 1000 | Refills: 0 | Status: DISCONTINUED | OUTPATIENT
Start: 2025-01-03 | End: 2025-01-04

## 2025-01-03 RX ORDER — IBUPROFEN 200 MG
400 TABLET ORAL ONCE
Refills: 0 | Status: COMPLETED | OUTPATIENT
Start: 2025-01-03 | End: 2025-01-03

## 2025-01-03 RX ORDER — SENNOSIDES 8.6 MG/1
2 TABLET, FILM COATED ORAL AT BEDTIME
Refills: 0 | Status: DISCONTINUED | OUTPATIENT
Start: 2025-01-03 | End: 2025-01-16

## 2025-01-03 RX ORDER — LACTULOSE 10 G/15ML
20 SOLUTION ORAL; RECTAL ONCE
Refills: 0 | Status: COMPLETED | OUTPATIENT
Start: 2025-01-03 | End: 2025-01-03

## 2025-01-03 RX ORDER — LACTULOSE 10 G/15ML
200 SOLUTION ORAL; RECTAL ONCE
Refills: 0 | Status: DISCONTINUED | OUTPATIENT
Start: 2025-01-03 | End: 2025-01-03

## 2025-01-03 RX ORDER — LACTULOSE 10 G/15ML
2 SOLUTION ORAL; RECTAL ONCE
Refills: 0 | Status: DISCONTINUED | OUTPATIENT
Start: 2025-01-03 | End: 2025-01-03

## 2025-01-03 RX ORDER — DEXMEDETOMIDINE HYDROCHLORIDE 4 UG/ML
0.2 INJECTION, SOLUTION INTRAVENOUS
Qty: 400 | Refills: 0 | Status: DISCONTINUED | OUTPATIENT
Start: 2025-01-03 | End: 2025-01-05

## 2025-01-03 RX ORDER — HEPARIN SODIUM 1000 [USP'U]/ML
1500 INJECTION, SOLUTION INTRAVENOUS; SUBCUTANEOUS
Qty: 25000 | Refills: 0 | Status: DISCONTINUED | OUTPATIENT
Start: 2025-01-03 | End: 2025-01-05

## 2025-01-03 RX ADMIN — LACTULOSE 20 GRAM(S): 10 SOLUTION ORAL; RECTAL at 11:26

## 2025-01-03 RX ADMIN — HEPARIN SODIUM 5000 UNIT(S): 1000 INJECTION, SOLUTION INTRAVENOUS; SUBCUTANEOUS at 05:03

## 2025-01-03 RX ADMIN — Medication 17 GRAM(S): at 17:56

## 2025-01-03 RX ADMIN — AMIODARONE HYDROCHLORIDE 200 MILLIGRAM(S): 200 TABLET ORAL at 05:03

## 2025-01-03 RX ADMIN — CHLORHEXIDINE GLUCONATE 15 MILLILITER(S): 1.2 RINSE ORAL at 17:56

## 2025-01-03 RX ADMIN — Medication 75 MILLIGRAM(S): at 17:55

## 2025-01-03 RX ADMIN — INSULIN GLARGINE-YFGN 10 UNIT(S): 100 INJECTION, SOLUTION SUBCUTANEOUS at 21:23

## 2025-01-03 RX ADMIN — EZETIMIBE 10 MILLIGRAM(S): 10 TABLET ORAL at 11:28

## 2025-01-03 RX ADMIN — NOREPINEPHRINE BITARTRATE 7.7 MICROGRAM(S)/KG/MIN: 1 INJECTION INTRAVENOUS at 14:38

## 2025-01-03 RX ADMIN — DEXMEDETOMIDINE HYDROCHLORIDE 4.11 MICROGRAM(S)/KG/HR: 4 INJECTION, SOLUTION INTRAVENOUS at 04:08

## 2025-01-03 RX ADMIN — Medication 75 MILLIGRAM(S): at 11:26

## 2025-01-03 RX ADMIN — HEPARIN SODIUM 1200 UNIT(S)/HR: 1000 INJECTION, SOLUTION INTRAVENOUS; SUBCUTANEOUS at 22:11

## 2025-01-03 RX ADMIN — Medication 400 MILLIGRAM(S): at 05:03

## 2025-01-03 RX ADMIN — FENOFIBRATE 145 MILLIGRAM(S): 48 TABLET ORAL at 11:28

## 2025-01-03 RX ADMIN — BUMETANIDE 2 MILLIGRAM(S): 2 TABLET ORAL at 09:10

## 2025-01-03 RX ADMIN — Medication 1: at 12:13

## 2025-01-03 RX ADMIN — HEPARIN SODIUM 0 UNIT(S)/HR: 1000 INJECTION, SOLUTION INTRAVENOUS; SUBCUTANEOUS at 21:08

## 2025-01-03 RX ADMIN — SENNOSIDES 2 TABLET(S): 8.6 TABLET, FILM COATED ORAL at 21:23

## 2025-01-03 RX ADMIN — CHLORHEXIDINE GLUCONATE 1 APPLICATION(S): 1.2 RINSE ORAL at 05:03

## 2025-01-03 RX ADMIN — Medication 1: at 17:55

## 2025-01-03 RX ADMIN — Medication 100 MILLIGRAM(S): at 05:02

## 2025-01-03 RX ADMIN — Medication 400 MILLIGRAM(S): at 06:00

## 2025-01-03 RX ADMIN — Medication 100 MILLIGRAM(S): at 17:56

## 2025-01-03 RX ADMIN — PROPOFOL 4.93 MICROGRAM(S)/KG/MIN: 10 INJECTION, EMULSION INTRAVENOUS at 14:38

## 2025-01-03 RX ADMIN — CLOPIDOGREL BISULFATE 75 MILLIGRAM(S): 75 TABLET, FILM COATED ORAL at 11:27

## 2025-01-03 RX ADMIN — CHLORHEXIDINE GLUCONATE 15 MILLILITER(S): 1.2 RINSE ORAL at 05:03

## 2025-01-03 RX ADMIN — HEPARIN SODIUM 1500 UNIT(S)/HR: 1000 INJECTION, SOLUTION INTRAVENOUS; SUBCUTANEOUS at 13:04

## 2025-01-03 RX ADMIN — IOHEXOL 30 MILLILITER(S): 350 INJECTION, SOLUTION INTRAVENOUS at 11:46

## 2025-01-03 NOTE — PROGRESS NOTE ADULT - SUBJECTIVE AND OBJECTIVE BOX
Patient is a 69y old  Male who presents with a chief complaint of CHF exacerbation (03 Jan 2025 08:59)        Over Night Events:  Remains on MV. On SAT, following commands. Tmax 103.1F noted. Levophed 0.06.       ROS:     All ROS are negative except HPI         PHYSICAL EXAM    ICU Vital Signs Last 24 Hrs  T(C): 39.3 (03 Jan 2025 07:10), Max: 39.5 (03 Jan 2025 05:00)  T(F): 102.7 (03 Jan 2025 07:10), Max: 103.1 (03 Jan 2025 05:00)  HR: 100 (03 Jan 2025 09:01) (79 - 100)  BP: 114/67 (03 Jan 2025 09:00) (84/56 - 145/88)  BP(mean): 81 (03 Jan 2025 09:00) (65 - 111)  ABP: --  ABP(mean): --  RR: 21 (03 Jan 2025 09:02) (18 - 42)  SpO2: 100% (03 Jan 2025 09:02) (99% - 100%)    O2 Parameters below as of 03 Jan 2025 09:00  Patient On (Oxygen Delivery Method): ventilator            CONSTITUTIONAL:  in NAD    ENT:   Airway patent,   Mouth with normal mucosa.     EYES:   Pupils equal,   Round and reactive to light.    CARDIAC:   Normal rate,   Regular rhythm.    LE edema    Vascular:  Normal systolic impulse  No Carotid bruits    RESPIRATORY:   No wheezing  Bilateral BS  Normal chest expansion  Not tachypneic,  No use of accessory muscles    GASTROINTESTINAL:  Abdomen soft,   Non-tender,   No guarding,   + BS    MUSCULOSKELETAL:   Range of motion is not limited,  No clubbing, cyanosis    NEUROLOGICAL:   Alert and oriented   No motor  deficits.    SKIN:   Skin normal color for race,   Warm and dry     01-02-25 @ 07:01  -  01-03-25 @ 07:00  --------------------------------------------------------  IN:    Dexmedetomidine: 577 mL    FentaNYL: 132.1 mL    IV PiggyBack: 100 mL    Norepinephrine: 306.8 mL  Total IN: 1115.9 mL    OUT:    Indwelling Catheter - Urethral (mL): 3110 mL  Total OUT: 3110 mL    Total NET: -1994.1 mL      01-03-25 @ 07:01  -  01-03-25 @ 09:45  --------------------------------------------------------  IN:  Total IN: 0 mL    OUT:    Indwelling Catheter - Urethral (mL): 70 mL  Total OUT: 70 mL    Total NET: -70 mL          LABS:                            14.4   11.98 )-----------( 383      ( 03 Jan 2025 05:34 )             45.4                                               01-03    144  |  103  |  53[H]  ----------------------------<  161[H]  4.2   |  26  |  1.8[H]    Ca    8.6      03 Jan 2025 05:34  Phos  3.9     01-03  Mg     2.1     01-03    TPro  5.3[L]  /  Alb  2.9[L]  /  TBili  0.7  /  DBili  x   /  AST  >700[H]  /  ALT  458[H]  /  AlkPhos  77  01-02                                             Urinalysis Basic - ( 03 Jan 2025 05:34 )    Color: x / Appearance: x / SG: x / pH: x  Gluc: 161 mg/dL / Ketone: x  / Bili: x / Urobili: x   Blood: x / Protein: x / Nitrite: x   Leuk Esterase: x / RBC: x / WBC x   Sq Epi: x / Non Sq Epi: x / Bacteria: x                                                  LIVER FUNCTIONS - ( 02 Jan 2025 06:00 )  Alb: 2.9 g/dL / Pro: 5.3 g/dL / ALK PHOS: 77 U/L / ALT: 458 U/L / AST: >700 U/L / GGT: x                                                  Culture - Blood (collected 31 Dec 2024 15:45)  Source: .Blood BLOOD  Preliminary Report (03 Jan 2025 02:02):    No growth at 48 Hours                                                   Mode: AC/ CMV (Assist Control/ Continuous Mandatory Ventilation)  RR (machine): 20  TV (machine): 420  FiO2: 40  PEEP: 8  MAP: 11  PIP: 22                                      ABG - ( 03 Jan 2025 04:20 )  pH, Arterial: 7.50  pH, Blood: x     /  pCO2: 40    /  pO2: 154   / HCO3: 31    / Base Excess: 7.4   /  SaO2: 98.7                MEDICATIONS  (STANDING):  aMIOdarone    Tablet 200 milliGRAM(s) Oral daily  atorvastatin 40 milliGRAM(s) Oral at bedtime  buMETAnide Injectable 2 milliGRAM(s) IV Push once  buMETAnide Injectable 2 milliGRAM(s) IV Push every 12 hours  cefepime   IVPB      cefepime   IVPB 2000 milliGRAM(s) IV Intermittent every 12 hours  chlorhexidine 0.12% Liquid 15 milliLiter(s) Oral Mucosa every 12 hours  chlorhexidine 2% Cloths 1 Application(s) Topical <User Schedule>  clopidogrel Tablet 75 milliGRAM(s) Oral daily  dexMEDEtomidine Infusion 0.2 MICROgram(s)/kG/Hr (4.11 mL/Hr) IV Continuous <Continuous>  dextrose 5%. 1000 milliLiter(s) (50 mL/Hr) IV Continuous <Continuous>  dextrose 50% Injectable 25 Gram(s) IV Push once  dextrose 50% Injectable 12.5 Gram(s) IV Push once  dextrose 50% Injectable 25 Gram(s) IV Push once  ezetimibe 10 milliGRAM(s) Oral daily  fenofibrate Tablet 145 milliGRAM(s) Oral daily  fentaNYL   Infusion. 0.501 MICROgram(s)/kG/Hr (4.11 mL/Hr) IV Continuous <Continuous>  glucagon  Injectable 1 milliGRAM(s) IntraMuscular once  heparin   Injectable 5000 Unit(s) SubCutaneous every 12 hours  insulin glargine Injectable (LANTUS) 10 Unit(s) SubCutaneous at bedtime  insulin lispro (ADMELOG) corrective regimen sliding scale   SubCutaneous three times a day before meals  metoprolol tartrate 75 milliGRAM(s) Oral every 6 hours  norepinephrine Infusion 0.05 MICROgram(s)/kG/Min (7.7 mL/Hr) IV Continuous <Continuous>    MEDICATIONS  (PRN):  albuterol/ipratropium for Nebulization 3 milliLiter(s) Nebulizer every 6 hours PRN Shortness of Breath and/or Wheezing  aluminum hydroxide/magnesium hydroxide/simethicone Suspension 30 milliLiter(s) Oral every 4 hours PRN Dyspepsia  dextrose Oral Gel 15 Gram(s) Oral once PRN Blood Glucose LESS THAN 70 milliGRAM(s)/deciliter  hydrOXYzine hydrochloride 25 milliGRAM(s) Oral every 6 hours PRN Agitation  melatonin 3 milliGRAM(s) Oral at bedtime PRN Insomnia  ondansetron Injectable 4 milliGRAM(s) IV Push every 8 hours PRN Nausea and/or Vomiting  zolpidem 5 milliGRAM(s) Oral at bedtime PRN Insomnia      New X-rays reviewed:                                                                                  ECHO   Patient is a 69y old  Male who presents with a chief complaint of CHF exacerbation (03 Jan 2025 08:59)        Over Night Events: Remains on MV. On SAT, following commands, failed SBT. Tmax 103.1F noted. Levophed 0.06.     Vital Signs Last 24 Hrs  T(C): 39.3 (03 Jan 2025 07:10), Max: 39.5 (03 Jan 2025 05:00)  T(F): 102.7 (03 Jan 2025 07:10), Max: 103.1 (03 Jan 2025 05:00)  HR: 100 (03 Jan 2025 09:01) (79 - 100)  BP: 114/67 (03 Jan 2025 09:00) (84/56 - 145/88)  BP(mean): 81 (03 Jan 2025 09:00) (65 - 111)  RR: 21 (03 Jan 2025 09:02) (18 - 42)  SpO2: 100% (03 Jan 2025 09:02) (99% - 100%)    O2 Parameters below as of 03 Jan 2025 09:00  Patient On (Oxygen Delivery Method): ventilator            CONSTITUTIONAL:  in NAD    ENT:   Airway patent,   Mouth with normal mucosa.     EYES:   Pupils equal,   Round and reactive to light.    CARDIAC:   irregular  LE edema    RESPIRATORY:   No wheezing  Bilateral BS  Normal chest expansion  Not tachypneic,  No use of accessory muscles    GASTROINTESTINAL:  Abdomen soft,   Non-tender,   No guarding,   + BS    MUSCULOSKELETAL:   Range of motion is not limited    NEUROLOGICAL:   Alert  follows commands at times agitated     SKIN:   Skin normal color for race    01-02-25 @ 07:01  -  01-03-25 @ 07:00  --------------------------------------------------------  IN:    Dexmedetomidine: 577 mL    FentaNYL: 132.1 mL    IV PiggyBack: 100 mL    Norepinephrine: 306.8 mL  Total IN: 1115.9 mL    OUT:    Indwelling Catheter - Urethral (mL): 3110 mL  Total OUT: 3110 mL    Total NET: -1994.1 mL      01-03-25 @ 07:01  -  01-03-25 @ 09:45  --------------------------------------------------------  IN:  Total IN: 0 mL    OUT:    Indwelling Catheter - Urethral (mL): 70 mL  Total OUT: 70 mL    Total NET: -70 mL          LABS:                            14.4   11.98 )-----------( 383      ( 03 Jan 2025 05:34 )             45.4                                               01-03    144  |  103  |  53[H]  ----------------------------<  161[H]  4.2   |  26  |  1.8[H]    Ca    8.6      03 Jan 2025 05:34  Phos  3.9     01-03  Mg     2.1     01-03    TPro  5.3[L]  /  Alb  2.9[L]  /  TBili  0.7  /  DBili  x   /  AST  >700[H]  /  ALT  458[H]  /  AlkPhos  77  01-02                                             Urinalysis Basic - ( 03 Jan 2025 05:34 )    Color: x / Appearance: x / SG: x / pH: x  Gluc: 161 mg/dL / Ketone: x  / Bili: x / Urobili: x   Blood: x / Protein: x / Nitrite: x   Leuk Esterase: x / RBC: x / WBC x   Sq Epi: x / Non Sq Epi: x / Bacteria: x                                                  LIVER FUNCTIONS - ( 02 Jan 2025 06:00 )  Alb: 2.9 g/dL / Pro: 5.3 g/dL / ALK PHOS: 77 U/L / ALT: 458 U/L / AST: >700 U/L / GGT: x                                                  Culture - Blood (collected 31 Dec 2024 15:45)  Source: .Blood BLOOD  Preliminary Report (03 Jan 2025 02:02):    No growth at 48 Hours                                                   Mode: AC/ CMV (Assist Control/ Continuous Mandatory Ventilation)  RR (machine): 20  TV (machine): 420  FiO2: 40  PEEP: 8  MAP: 11  PIP: 22                                      ABG - ( 03 Jan 2025 04:20 )  pH, Arterial: 7.50  pH, Blood: x     /  pCO2: 40    /  pO2: 154   / HCO3: 31    / Base Excess: 7.4   /  SaO2: 98.7                MEDICATIONS  (STANDING):  aMIOdarone    Tablet 200 milliGRAM(s) Oral daily  atorvastatin 40 milliGRAM(s) Oral at bedtime  buMETAnide Injectable 2 milliGRAM(s) IV Push once  buMETAnide Injectable 2 milliGRAM(s) IV Push every 12 hours  cefepime   IVPB      cefepime   IVPB 2000 milliGRAM(s) IV Intermittent every 12 hours  chlorhexidine 0.12% Liquid 15 milliLiter(s) Oral Mucosa every 12 hours  chlorhexidine 2% Cloths 1 Application(s) Topical <User Schedule>  clopidogrel Tablet 75 milliGRAM(s) Oral daily  dexMEDEtomidine Infusion 0.2 MICROgram(s)/kG/Hr (4.11 mL/Hr) IV Continuous <Continuous>  dextrose 5%. 1000 milliLiter(s) (50 mL/Hr) IV Continuous <Continuous>  dextrose 50% Injectable 25 Gram(s) IV Push once  dextrose 50% Injectable 12.5 Gram(s) IV Push once  dextrose 50% Injectable 25 Gram(s) IV Push once  ezetimibe 10 milliGRAM(s) Oral daily  fenofibrate Tablet 145 milliGRAM(s) Oral daily  fentaNYL   Infusion. 0.501 MICROgram(s)/kG/Hr (4.11 mL/Hr) IV Continuous <Continuous>  glucagon  Injectable 1 milliGRAM(s) IntraMuscular once  heparin   Injectable 5000 Unit(s) SubCutaneous every 12 hours  insulin glargine Injectable (LANTUS) 10 Unit(s) SubCutaneous at bedtime  insulin lispro (ADMELOG) corrective regimen sliding scale   SubCutaneous three times a day before meals  metoprolol tartrate 75 milliGRAM(s) Oral every 6 hours  norepinephrine Infusion 0.05 MICROgram(s)/kG/Min (7.7 mL/Hr) IV Continuous <Continuous>    MEDICATIONS  (PRN):  albuterol/ipratropium for Nebulization 3 milliLiter(s) Nebulizer every 6 hours PRN Shortness of Breath and/or Wheezing  aluminum hydroxide/magnesium hydroxide/simethicone Suspension 30 milliLiter(s) Oral every 4 hours PRN Dyspepsia  dextrose Oral Gel 15 Gram(s) Oral once PRN Blood Glucose LESS THAN 70 milliGRAM(s)/deciliter  hydrOXYzine hydrochloride 25 milliGRAM(s) Oral every 6 hours PRN Agitation  melatonin 3 milliGRAM(s) Oral at bedtime PRN Insomnia  ondansetron Injectable 4 milliGRAM(s) IV Push every 8 hours PRN Nausea and/or Vomiting  zolpidem 5 milliGRAM(s) Oral at bedtime PRN Insomnia

## 2025-01-03 NOTE — PROGRESS NOTE ADULT - ASSESSMENT
69-year-old male with past medical history of chronic HFrEF s/p ICD, AVR porcine, HTN, PAF s/p ablation, CAD s/p PCI, DM, HLD, COPD (no home O2), smoker presenting with worsening shortness of breath and weakness with concern for acute on chronic CHF exacerbation in the setting of persistent afib    GENERAL: off sedation intubated  CHEST/LUNG: dec bs b/l   HEART: tachy No murmurs, rubs, or gallops  ABDOMEN: distended. tender to palation  NERVOUS SYSTEM:  follows commandse      IMPRESSION:    Acute Hypoxemic Respiratory Failure   Pulmonary Edema  Acute Decompensated HFmrEF   Toxic Metabolic Encephalopathy   Persistent AFIB awaiting Albation   KAREN - stable.   Transaminitis worsening possible DILI vs congestive hepatopathy  HO ICM s/p CRT-D  HO Bioprosthetic AV  HO CVA  HO DM   HO COPD     PLAN:    SAT/SBT as per pulm   CT chest and CT abdomen today  RUQ   EP follow up as tele showing paced at ?  Patient NPO? Resume tube feeds if not extubated today   Hepatology consult - elevated Enzymes/ INR elevated  Follow up ID as patient is febrile. will continue cefepime. - cause of DILI?  Start AC- heparing drip  stop all hepatotoxic drug today    MICU monitoring

## 2025-01-03 NOTE — PROGRESS NOTE ADULT - SUBJECTIVE AND OBJECTIVE BOX
TOSINJAMEEMONIQUE CHRISTINE  69y, Male    LOS  7d    INTERVAL EVENTS/HPI  - No acute events overnight  - T(F): , Max: 103.1 (01-03-25 @ 05:00)  - WBC Count: 11.98 (01-03-25 @ 05:34)  WBC Count: 13.73 (01-02-25 @ 06:00)  - Creatinine: 1.8 (01-03-25 @ 05:34)  Creatinine: 1.7 (01-02-25 @ 06:00)    REVIEW OF SYSTEMS: cannot obtain further history from the patient secondary to altered mental status or sedation    Prior hospital charts reviewed [Yes]  Primary team notes reviewed [Yes]  Other consultant notes reviewed [Yes]    ANTIMICROBIALS:   cefepime   IVPB    cefepime   IVPB 2000 every 12 hours      OTHER MEDS: MEDICATIONS  (STANDING):  albuterol/ipratropium for Nebulization 3 every 6 hours PRN  aluminum hydroxide/magnesium hydroxide/simethicone Suspension 30 every 4 hours PRN  aMIOdarone    Tablet 200 daily  atorvastatin 40 at bedtime  buMETAnide Injectable 2 once  buMETAnide Injectable 2 every 12 hours  clopidogrel Tablet 75 daily  dexMEDEtomidine Infusion 0.2 <Continuous>  dextrose 50% Injectable 25 once  dextrose 50% Injectable 12.5 once  dextrose 50% Injectable 25 once  dextrose Oral Gel 15 once PRN  ezetimibe 10 daily  fenofibrate Tablet 145 daily  fentaNYL   Infusion. 0.501 <Continuous>  glucagon  Injectable 1 once  heparin   Injectable 5000 every 12 hours  hydrOXYzine hydrochloride 25 every 6 hours PRN  insulin glargine Injectable (LANTUS) 10 at bedtime  insulin lispro (ADMELOG) corrective regimen sliding scale  three times a day before meals  melatonin 3 at bedtime PRN  metoprolol tartrate 75 every 6 hours  norepinephrine Infusion 0.05 <Continuous>  ondansetron Injectable 4 every 8 hours PRN  zolpidem 5 at bedtime PRN      Vital Signs Last 24 Hrs  T(F): 102.7 (01-03-25 @ 07:10), Max: 104.4 (12-31-24 @ 15:05)    Vital Signs Last 24 Hrs  HR: 90 (01-03-25 @ 08:00) (79 - 97)  BP: 85/58 (01-03-25 @ 08:00) (84/56 - 145/88)  RR: 20 (01-03-25 @ 08:00)  SpO2: 100% (01-03-25 @ 08:00) (99% - 100%)  Wt(kg): --    EXAM:  GENERAL: On MV  HEAD: No head lesions  NECK: Supple  CHEST/LUNG: Shallow breath sounds.   HEART: S1 S2  ABDOMEN: Soft, nontender, Distended.   EXTREMITIES: No clubbing, cyanosis, or petal edema  NERVOUS SYSTEM: On MV  MSK: No joint erythema, swelling or pain  SKIN: No rashes or lesions, no superficial thrombophlebitis    Labs:                        14.4   11.98 )-----------( 383      ( 03 Jan 2025 05:34 )             45.4     01-03    144  |  103  |  53[H]  ----------------------------<  161[H]  4.2   |  26  |  1.8[H]    Ca    8.6      03 Jan 2025 05:34  Phos  3.9     01-03  Mg     2.1     01-03    TPro  5.3[L]  /  Alb  2.9[L]  /  TBili  0.7  /  DBili  x   /  AST  >700[H]  /  ALT  458[H]  /  AlkPhos  77  01-02      WBC Trend:  WBC Count: 11.98 (01-03-25 @ 05:34)  WBC Count: 13.73 (01-02-25 @ 06:00)  WBC Count: 12.45 (01-01-25 @ 05:55)  WBC Count: 14.78 (12-31-24 @ 15:45)      Creatine Trend:  Creatinine: 1.8 (01-03)  Creatinine: 1.7 (01-02)  Creatinine: 1.9 (01-01)  Creatinine: 2.3 (01-01)      Liver Biochemical Testing Trend:  Alanine Aminotransferase (ALT/SGPT): 458 *H* (01-02)  Alanine Aminotransferase (ALT/SGPT): 229 *H* (01-01)  Alanine Aminotransferase (ALT/SGPT): 37 (12-31)  Alanine Aminotransferase (ALT/SGPT): 22 (12-31)  Alanine Aminotransferase (ALT/SGPT): 21 (12-30)  Aspartate Aminotransferase (AST/SGOT): >700 (01-02-25 @ 06:00)  Aspartate Aminotransferase (AST/SGOT): 412 (01-01-25 @ 05:55)  Aspartate Aminotransferase (AST/SGOT): 87 (12-31-24 @ 15:45)  Aspartate Aminotransferase (AST/SGOT): 27 (12-31-24 @ 03:47)  Aspartate Aminotransferase (AST/SGOT): 20 (12-30-24 @ 08:02)  Bilirubin Total: 0.7 (01-02)  Bilirubin Total: 0.9 (01-01)  Bilirubin Total: 1.3 (12-31)  Bilirubin Total: 0.8 (12-31)  Bilirubin Total: 0.8 (12-30)      Trend LDH      Urinalysis Basic - ( 03 Jan 2025 05:34 )    Color: x / Appearance: x / SG: x / pH: x  Gluc: 161 mg/dL / Ketone: x  / Bili: x / Urobili: x   Blood: x / Protein: x / Nitrite: x   Leuk Esterase: x / RBC: x / WBC x   Sq Epi: x / Non Sq Epi: x / Bacteria: x        MICROBIOLOGY:    Male    Culture - Blood (collected 31 Dec 2024 15:45)  Source: .Blood BLOOD  Preliminary Report:    No growth at 48 Hours    Culture - Blood (collected 03 Sep 2023 12:50)  Source: .Blood Blood  Final Report:    No growth at 5 days    Culture - Blood (collected 03 Sep 2023 12:50)  Source: .Blood Blood  Final Report:    No growth at 5 days      Procalcitonin: 0.04 (12-27)              Troponin T, High Sensitivity Result: 42 (12-31)  Troponin T, High Sensitivity Result: 40 (12-31)  Troponin T, High Sensitivity Result: 49 (12-27)  Troponin T, High Sensitivity Result: 52 (12-27)    Blood Gas Arterial, Lactate: 1.2 (01-03 @ 04:20)  Blood Gas Arterial, Lactate: 1.1 (01-02 @ 17:39)  Blood Gas Arterial, Lactate: 0.8 (01-02 @ 04:30)  Lactate, Blood: 2.3 (12-31 @ 15:45)        RADIOLOGY & ADDITIONAL TESTS:  I have personally reviewed the relevant images.   CXR  Xray Chest 1 View- PORTABLE-Urgent:   ACC: [id number] EXAM:  XR CHEST PORTABLE URGENT 1V   ORDERED BY: TOSHA NEVAREZ     PROCEDURE DATE:  01/01/2025          INTERPRETATION:  CLINICAL HISTORY: Follow-up.    COMPARISON: January 1, 2025.    TECHNIQUE: Portable frontal chest radiograph. Low lung volume.    FINDINGS:    Support devices: ETT with its tip above the oscar and NGT with its tip   below the diaphragm. Left-sided permanent pacemaker.    Cardiac/mediastinum/hilum: Stable.    Lung parenchyma/Pleura: Bilateral opacities, slightly improved. No   pneumothorax is seen.    Skeleton/soft tissues: Stable.    IMPRESSION: Low lung volume.    Bilateral opacities, slightly improved.    Support tubes as above.    Left-sided permanent pacemaker.    --- End of Report ---            AISLINN BERTRAND MD; Attending Radiologist  This document has been electronically signed. Jan 2 2025  4:29AM (01-01-25 @ 19:44)      CT        WEIGHT  Weight (kg): 82.1 (12-27-24 @ 16:33)  Creatinine: 1.8 mg/dL (01-03-25 @ 05:34)      All available historical records have been reviewed

## 2025-01-03 NOTE — PROGRESS NOTE ADULT - ASSESSMENT
IMPRESSION:    Acute Hypoxemic Respiratory Failure   Pulmonary Edema  Acute Decompensated HFmrEF   Toxic Metabolic Encephalopathy   Persistent AFIB awaiting Albation   KAREN - improving.   Transaminitis  HO ICM s/p CRT-D  HO Bioprosthetic AV  HO CVA  HO DM   HO COPD     PLAN:    CNS: Sedation with fentanyl and precedex. Daily SAT. CT Head 12/31 noted.     HEENT: Oral and ETT care. ETT day #4.     PULMONARY:  HOB @ 45 degrees.  Aspiration precautions ABG reviewed, decrease RR to 16, . CXR with bilateral opacities, improving. SBT with intent to extubate to BiPAP.      CARDIOVASCULAR: Volume contraction as tolerated, optimize cardiac therapy, Decrease Bumex to daily for now  Rate control, Cardiology follow up, TTE reviewed, proBNP noted. Wean levophed as tolerated, target SpO2 92-96%.   Check EKG.     GI: GI prophylaxis. If no extubation, OG feeds. Bowel regimen. Trend LFTs. RUQ US. DC statin.     RENAL:  Follow up lytes.  Correct as needed.  Consult nephrology. Monitor UO.     INFECTIOUS DISEASE: Follow up cultures, ABX per ID: cefepime. RVP negative. UA noted.     HEMATOLOGICAL: Full AC. Monitor CBC and coags.     ENDOCRINE:  Follow up FS.  Insulin protocol if needed.    MUSCULOSKELETAL: bedrest for now    Goals of care     MICU monitoring  IMPRESSION:    Acute Hypoxemic Respiratory Failure   Pulmonary Edema  Acute Decompensated HFmrEF   Toxic Metabolic Encephalopathy   Persistent AFIB awaiting Albation   KAREN - improving.   Transaminitis  HO ICM s/p CRT-D  HO Bioprosthetic AV  HO CVA  HO DM   HO COPD     PLAN:    CNS: Sedation with fentanyl and precedex. Daily SAT. CT Head 12/31 noted.     HEENT: Oral and ETT care. ETT day #4.     PULMONARY:  HOB @ 45 degrees.  Aspiration precautions ABG reviewed, decrease RR to 16, . CXR with bilateral opacities, improving. SBT with intent to extubate to BiPAP if passes.    CARDIOVASCULAR: Volume contraction as tolerated, optimize cardiac therapy, Decrease Bumex to daily for now  Rate control, Cardiology follow up, TTE reviewed, proBNP noted. Wean levophed as tolerated, target SpO2 92-96%.   Check EKG.     GI: GI prophylaxis. If no extubation, OG feeds. Bowel regimen. Trend LFTs. RUQ US. DC statin. GI evaluation.     RENAL:  Follow up lytes.  Correct as needed.  Consult nephrology. Monitor UO.     INFECTIOUS DISEASE: Follow up cultures, ABX per ID: cefepime. RVP negative. UA noted.     HEMATOLOGICAL: Full AC. Monitor CBC and coags.     ENDOCRINE:  Follow up FS.  Insulin protocol if needed.    MUSCULOSKELETAL: bedrest for now    Goals of care     MICU monitoring

## 2025-01-03 NOTE — PROGRESS NOTE ADULT - ASSESSMENT
This is a 69-year-old male with past medical history of chronic HFrEF s/p ICD, AVR porcine, HTN, PAF s/p ablation, CAD s/p PCI, DM, HLD, COPD (no home O2), smoker presenting with worsening shortness of breath and weakness.    IMPRESSION  #Acute hypoxic respiratory failure   Likely from V-Tach and heart failure exacerbation. Cardiogenic shock  #High Fevers- Perhaps Aspirational pneumonitis  #Leukocytosis-Likely reactive.   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  Blood cultures NGTD  MRSA nares negative    RECOMMENDATIONS  -Unclear why was the patient on abx initially.   -Collect Sputum cultures.  -US liver and Kidneys  -IV Cefepime 2 gram q12 hours for now. (S/D 12/31)  -Cardiology/EP follow up.   -Off loading to prevent pressure sores and preventive measures to avoid aspiration. TOV when able.     If any questions, please send a message or call on [employer] Teams  Please continue to update ID with any pertinent new laboratory or radiographic findings.    Benigno Pond M.D  Infectious Diseases Attending/   [employer] at [facility]   This is a 69-year-old male with past medical history of chronic HFrEF s/p ICD, AVR porcine, HTN, PAF s/p ablation, CAD s/p PCI, DM, HLD, COPD (no home O2), smoker presenting with worsening shortness of breath and weakness.    IMPRESSION  #Acute hypoxic respiratory failure   Likely from V-Tach and heart failure exacerbation. Cardiogenic shock  #High Fevers- Perhaps Aspirational pneumonitis  #Leukocytosis-Likely reactive.   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  Blood cultures NGTD  MRSA nares negative    RECOMMENDATIONS  -Unclear why was the patient on abx initially.   -Collect Sputum cultures.  -US liver and Kidneys  -IV Cefepime 2 gram q12 hours for now. (S/D 12/31)  -Cardiology/EP follow up.   -Off loading to prevent pressure sores and preventive measures to avoid aspiration. TOV when able.   -Guarded prognosis.      If any questions, please send a message or call on [employer] Teams  Please continue to update ID with any pertinent new laboratory or radiographic findings.    Benigno Pond M.D  Infectious Diseases Attending/   [employer] at [facility]

## 2025-01-03 NOTE — PROGRESS NOTE ADULT - SUBJECTIVE AND OBJECTIVE BOX
SUBJECTIVE:    Patient is a 69y old Male who presents with a chief complaint of CHF exacerbation (03 Jan 2025 09:43)      Today is hospital day 7d.     Overnight Events:     patient SAT passed. Has abdominal pain. No stools . Has been   PAST MEDICAL & SURGICAL HISTORY  CHF (congestive heart failure)    Diabetes    CAD (coronary artery disease)    Chronic obstructive pulmonary disease (COPD)    HTN (hypertension)    Stented coronary artery    Dyslipidemia    GERD (gastroesophageal reflux disease)    Afib    CVA (cerebral vascular accident)    H/O heart artery stent    Heart valve replaced  aortic    History of Nissen fundoplication          ALLERGIES:  sulfa drugs (Unknown)    MEDICATIONS:  STANDING MEDICATIONS  aMIOdarone    Tablet 200 milliGRAM(s) Oral daily  buMETAnide Injectable 2 milliGRAM(s) IV Push once  buMETAnide Injectable 2 milliGRAM(s) IV Push daily  cefepime   IVPB      cefepime   IVPB 2000 milliGRAM(s) IV Intermittent every 12 hours  chlorhexidine 0.12% Liquid 15 milliLiter(s) Oral Mucosa every 12 hours  chlorhexidine 2% Cloths 1 Application(s) Topical <User Schedule>  clopidogrel Tablet 75 milliGRAM(s) Oral daily  dexMEDEtomidine Infusion 0.2 MICROgram(s)/kG/Hr IV Continuous <Continuous>  dextrose 5%. 1000 milliLiter(s) IV Continuous <Continuous>  dextrose 50% Injectable 25 Gram(s) IV Push once  dextrose 50% Injectable 12.5 Gram(s) IV Push once  dextrose 50% Injectable 25 Gram(s) IV Push once  ezetimibe 10 milliGRAM(s) Oral daily  fenofibrate Tablet 145 milliGRAM(s) Oral daily  fentaNYL   Infusion. 0.501 MICROgram(s)/kG/Hr IV Continuous <Continuous>  glucagon  Injectable 1 milliGRAM(s) IntraMuscular once  heparin  Infusion. 1500 Unit(s)/Hr IV Continuous <Continuous>  insulin glargine Injectable (LANTUS) 10 Unit(s) SubCutaneous at bedtime  insulin lispro (ADMELOG) corrective regimen sliding scale   SubCutaneous three times a day before meals  metoprolol tartrate 75 milliGRAM(s) Oral every 6 hours  norepinephrine Infusion 0.05 MICROgram(s)/kG/Min IV Continuous <Continuous>  polyethylene glycol 3350 17 Gram(s) Oral two times a day  propofol Infusion 10 MICROgram(s)/kG/Min IV Continuous <Continuous>  senna 2 Tablet(s) Oral at bedtime    PRN MEDICATIONS  albuterol/ipratropium for Nebulization 3 milliLiter(s) Nebulizer every 6 hours PRN  aluminum hydroxide/magnesium hydroxide/simethicone Suspension 30 milliLiter(s) Oral every 4 hours PRN  dextrose Oral Gel 15 Gram(s) Oral once PRN  hydrOXYzine hydrochloride 25 milliGRAM(s) Oral every 6 hours PRN  melatonin 3 milliGRAM(s) Oral at bedtime PRN  ondansetron Injectable 4 milliGRAM(s) IV Push every 8 hours PRN  zolpidem 5 milliGRAM(s) Oral at bedtime PRN    VITALS:   ICU Vital Signs Last 24 Hrs  T(C): 38.9 (03 Jan 2025 15:05), Max: 39.5 (03 Jan 2025 05:00)  T(F): 102 (03 Jan 2025 15:05), Max: 103.1 (03 Jan 2025 05:00)  HR: 93 (03 Jan 2025 16:24) (79 - 105)  BP: 97/53 (03 Jan 2025 16:24) (75/50 - 142/67)  BP(mean): 73 (03 Jan 2025 16:24) (59 - 104)  RR: 22 (03 Jan 2025 17:00) (18 - 51)  SpO2: 100% (03 Jan 2025 17:00) (99% - 100%)    O2 Parameters below as of 03 Jan 2025 09:00  Patient On (Oxygen Delivery Method): ventilator            LABS:                        14.4   11.98 )-----------( 383      ( 03 Jan 2025 05:34 )             45.4     01-03    144  |  103  |  53[H]  ----------------------------<  161[H]  4.2   |  26  |  1.8[H]    Ca    8.6      03 Jan 2025 05:34  Phos  3.9     01-03  Mg     2.1     01-03    TPro  5.2[L]  /  Alb  2.8[L]  /  TBili  0.6  /  DBili  0.2  /  AST  1585[H]  /  ALT  967[H]  /  AlkPhos  78  01-03    PT/INR - ( 03 Jan 2025 11:40 )   PT: 21.80 sec;   INR: 1.82 ratio         PTT - ( 03 Jan 2025 11:40 )  PTT:48.6 sec   Urinalysis Basic - ( 03 Jan 2025 05:34 )    Color: x / Appearance: x / SG: x / pH: x  Gluc: 161 mg/dL / Ketone: x  / Bili: x / Urobili: x   Blood: x / Protein: x / Nitrite: x   Leuk Esterase: x / RBC: x / WBC x   Sq Epi: x / Non Sq Epi: x / Bacteria: x      ABG - ( 03 Jan 2025 04:20 )  pH, Arterial: 7.50  pH, Blood: x     /  pCO2: 40    /  pO2: 154   / HCO3: 31    / Base Excess: 7.4   /  SaO2: 98.7                      Cardiology:            RADIOLOGY:  CT Head No Cont:   ACC: [id number] EXAM:  CT BRAIN   ORDERED BY: NAYELY STANFORD     PROCEDURE DATE:  12/31/2024          INTERPRETATION:  CLINICAL INDICATION: Altered mental status    TECHNIQUE: Axial CT scanning of the brain was obtained from the skull   base to the vertex without the administration of intravenous contrast.   Reformatted coronal and sagittal images were subsequently obtained and   reviewed.    COMPARISON: MRI brain 9/3/2023    FINDINGS:  There is no CT evidence of acute transcortical infarct. Age-related   involutional changes and chronic microvascular ischemic changes.   Bilateral thalamic chronic lacunar infarcts. Bilateral cerebellar chronic   infarcts.    There is no hydrocephalus, mass effect, or acute intracranial hemorrhage.   No extra-axial collection. Basal cisterns are patent.    The visualized paranasal sinuses and mastoid air cells are clear.    The calvarium is intact.    IMPRESSION:  No evidence of acute transcortical infarct, acute intracranial   hemorrhage, or mass effect.    --- End of Report ---            ADRIANA RODRÍGUEZ MD; Attending Radiologist  This document has been electronically signed. Dec 31 2024 10:30AM (12-31-24 @ 10:19)          Lines:  Central line:              Date placed:             Indication:   Intravenous Access:   NG tube:   Mcmahon Catheter:       Diagnositic orders     propofol Infusion (01-03-25 @ 14:15) [Active]  Consult- Hepatology (01-03-25 @ 14:15) [Active]  Hypothermia [place] (01-03-25 @ 14:11) [Active]  Blood Glucose Point Of Care Testing (01-03-25 @ 12:44) [Active]  Provider to RN (01-03-25 @ 10:01) [Active]  Provider to RN (01-03-25 @ 10:01) [Active]  Provider to RN (01-03-25 @ 10:01) [Active]  heparin  Infusion. (01-03-25 @ 10:01) [Active]  12 Lead ECG (01-03-25 @ 09:54) [Ordered.]  polyethylene glycol 3350 (01-03-25 @ 09:50) [Active]  senna (01-03-25 @ 09:50) [Active]  buMETAnide Injectable (01-03-25 @ 09:50) [Active]  Xray Chest 1 View- PORTABLE-Routine (01-03-25 @ 08:31) [Ordered.]  Xray Chest 1 View- PORTABLE-Routine (01-03-25 @ 08:31) [Ordered.]  Xray Chest 1 View- PORTABLE-Routine (01-03-25 @ 08:31) [Ordered.]  zolpidem (01-03-25 @ 08:10) [Active]  dexMEDEtomidine Infusion (01-03-25 @ 08:09) [Active]  fentaNYL   Infusion. (01-02-25 @ 20:30) [Active]  NonViolent NonSelf-Destructive Restraint (01-02-25 @ 20:28) [Active]

## 2025-01-03 NOTE — PROGRESS NOTE ADULT - NS ATTEST RISK PROBLEM GEN_ALL_CORE FT
the following   --------------------------------Complexity of data reviewed ----------------------------------------------  The Patients complexity of data reviewed  is Low [ ] Moderate [ ] High [x ] due to the following:   Reviewed prior external records [ ]  Considering/ Ordered a unique test:  Labs [x ] Imaging [x ] Stress Test  [ ] Other: Specify [ ]  Reviewed each unique test result [x ]   Assessment requiring an independent historian [ ]  Independent interpretation of:   X-Ray [x ] EKG [ ]  Other: Specify  [ ]  Discussion of management of tests with clinician outside of my group [CC]  -------------------------------------Risk of Morbidity-------------------------------------------------------  The Patients risk of morbidity is Low [ ] Moderate [ ] High [ x] due to the following:   Prescription Drug Management [x ]  ICU [x ]  Drug Therapy requiring intensive monitoring for toxicity [x ]  Parenteral controlled substance [x ]

## 2025-01-04 LAB
ALBUMIN SERPL ELPH-MCNC: 2.9 G/DL — LOW (ref 3.5–5.2)
ALP SERPL-CCNC: 72 U/L — SIGNIFICANT CHANGE UP (ref 30–115)
ALT FLD-CCNC: 543 U/L — HIGH (ref 0–41)
ANION GAP SERPL CALC-SCNC: 14 MMOL/L — SIGNIFICANT CHANGE UP (ref 7–14)
APTT BLD: 104.7 SEC — CRITICAL HIGH (ref 27–39.2)
APTT BLD: 67.9 SEC — HIGH (ref 27–39.2)
APTT BLD: >200 SEC — CRITICAL HIGH (ref 27–39.2)
AST SERPL-CCNC: 338 U/L — HIGH (ref 0–41)
BASE EXCESS BLDA CALC-SCNC: 10 MMOL/L — HIGH (ref -2–3)
BASOPHILS # BLD AUTO: 0.09 K/UL — SIGNIFICANT CHANGE UP (ref 0–0.2)
BASOPHILS NFR BLD AUTO: 0.6 % — SIGNIFICANT CHANGE UP (ref 0–1)
BILIRUB SERPL-MCNC: 0.5 MG/DL — SIGNIFICANT CHANGE UP (ref 0.2–1.2)
BUN SERPL-MCNC: 71 MG/DL — CRITICAL HIGH (ref 10–20)
CALCIUM SERPL-MCNC: 8.8 MG/DL — SIGNIFICANT CHANGE UP (ref 8.4–10.5)
CHLORIDE SERPL-SCNC: 102 MMOL/L — SIGNIFICANT CHANGE UP (ref 98–110)
CO2 SERPL-SCNC: 28 MMOL/L — SIGNIFICANT CHANGE UP (ref 17–32)
CREAT SERPL-MCNC: 2.2 MG/DL — HIGH (ref 0.7–1.5)
EGFR: 32 ML/MIN/1.73M2 — LOW
EOSINOPHIL # BLD AUTO: 0.12 K/UL — SIGNIFICANT CHANGE UP (ref 0–0.7)
EOSINOPHIL NFR BLD AUTO: 0.8 % — SIGNIFICANT CHANGE UP (ref 0–8)
GAS PNL BLDA: SIGNIFICANT CHANGE UP
GLUCOSE BLDC GLUCOMTR-MCNC: 224 MG/DL — HIGH (ref 70–99)
GLUCOSE BLDC GLUCOMTR-MCNC: 249 MG/DL — HIGH (ref 70–99)
GLUCOSE BLDC GLUCOMTR-MCNC: 267 MG/DL — HIGH (ref 70–99)
GLUCOSE BLDC GLUCOMTR-MCNC: 271 MG/DL — HIGH (ref 70–99)
GLUCOSE SERPL-MCNC: 283 MG/DL — HIGH (ref 70–99)
HCO3 BLDA-SCNC: 35 MMOL/L — HIGH (ref 21–28)
HCT VFR BLD CALC: 45.2 % — SIGNIFICANT CHANGE UP (ref 42–52)
HGB BLD-MCNC: 14.2 G/DL — SIGNIFICANT CHANGE UP (ref 14–18)
IMM GRANULOCYTES NFR BLD AUTO: 0.6 % — HIGH (ref 0.1–0.3)
LYMPHOCYTES # BLD AUTO: 15 % — LOW (ref 20.5–51.1)
LYMPHOCYTES # BLD AUTO: 2.37 K/UL — SIGNIFICANT CHANGE UP (ref 1.2–3.4)
MAGNESIUM SERPL-MCNC: 2.3 MG/DL — SIGNIFICANT CHANGE UP (ref 1.8–2.4)
MCHC RBC-ENTMCNC: 28.1 PG — SIGNIFICANT CHANGE UP (ref 27–31)
MCHC RBC-ENTMCNC: 31.4 G/DL — LOW (ref 32–37)
MCV RBC AUTO: 89.5 FL — SIGNIFICANT CHANGE UP (ref 80–94)
MONOCYTES # BLD AUTO: 1.27 K/UL — HIGH (ref 0.1–0.6)
MONOCYTES NFR BLD AUTO: 8.1 % — SIGNIFICANT CHANGE UP (ref 1.7–9.3)
NEUTROPHILS # BLD AUTO: 11.82 K/UL — HIGH (ref 1.4–6.5)
NEUTROPHILS NFR BLD AUTO: 74.9 % — SIGNIFICANT CHANGE UP (ref 42.2–75.2)
NRBC # BLD: 0 /100 WBCS — SIGNIFICANT CHANGE UP (ref 0–0)
PCO2 BLDA: 47 MMHG — SIGNIFICANT CHANGE UP (ref 35–48)
PH BLDA: 7.48 — HIGH (ref 7.35–7.45)
PHOSPHATE SERPL-MCNC: 4.5 MG/DL — SIGNIFICANT CHANGE UP (ref 2.1–4.9)
PLATELET # BLD AUTO: 356 K/UL — SIGNIFICANT CHANGE UP (ref 130–400)
PMV BLD: 11.3 FL — HIGH (ref 7.4–10.4)
PO2 BLDA: 167 MMHG — HIGH (ref 83–108)
POTASSIUM SERPL-MCNC: 3.8 MMOL/L — SIGNIFICANT CHANGE UP (ref 3.5–5)
POTASSIUM SERPL-SCNC: 3.8 MMOL/L — SIGNIFICANT CHANGE UP (ref 3.5–5)
PROCALCITONIN SERPL-MCNC: 0.68 NG/ML — HIGH (ref 0.02–0.1)
PROT SERPL-MCNC: 5.2 G/DL — LOW (ref 6–8)
RBC # BLD: 5.05 M/UL — SIGNIFICANT CHANGE UP (ref 4.7–6.1)
RBC # FLD: 13.5 % — SIGNIFICANT CHANGE UP (ref 11.5–14.5)
SAO2 % BLDA: 98.5 % — HIGH (ref 94–98)
SODIUM SERPL-SCNC: 144 MMOL/L — SIGNIFICANT CHANGE UP (ref 135–146)
WBC # BLD: 15.76 K/UL — HIGH (ref 4.8–10.8)
WBC # FLD AUTO: 15.76 K/UL — HIGH (ref 4.8–10.8)

## 2025-01-04 PROCEDURE — 99291 CRITICAL CARE FIRST HOUR: CPT

## 2025-01-04 PROCEDURE — 99233 SBSQ HOSP IP/OBS HIGH 50: CPT

## 2025-01-04 PROCEDURE — 93010 ELECTROCARDIOGRAM REPORT: CPT

## 2025-01-04 PROCEDURE — 71045 X-RAY EXAM CHEST 1 VIEW: CPT | Mod: 26

## 2025-01-04 RX ORDER — IPRATROPIUM BROMIDE AND ALBUTEROL SULFATE .5; 2.5 MG/3ML; MG/3ML
3 SOLUTION RESPIRATORY (INHALATION) EVERY 6 HOURS
Refills: 0 | Status: COMPLETED | OUTPATIENT
Start: 2025-01-04 | End: 2025-01-06

## 2025-01-04 RX ADMIN — HEPARIN SODIUM 700 UNIT(S)/HR: 1000 INJECTION, SOLUTION INTRAVENOUS; SUBCUTANEOUS at 23:23

## 2025-01-04 RX ADMIN — FENOFIBRATE 145 MILLIGRAM(S): 48 TABLET ORAL at 13:17

## 2025-01-04 RX ADMIN — PROPOFOL 4.93 MICROGRAM(S)/KG/MIN: 10 INJECTION, EMULSION INTRAVENOUS at 02:48

## 2025-01-04 RX ADMIN — IPRATROPIUM BROMIDE AND ALBUTEROL SULFATE 3 MILLILITER(S): .5; 2.5 SOLUTION RESPIRATORY (INHALATION) at 14:23

## 2025-01-04 RX ADMIN — HEPARIN SODIUM 0 UNIT(S)/HR: 1000 INJECTION, SOLUTION INTRAVENOUS; SUBCUTANEOUS at 04:43

## 2025-01-04 RX ADMIN — Medication 3 MILLIGRAM(S): at 23:11

## 2025-01-04 RX ADMIN — Medication 3: at 18:41

## 2025-01-04 RX ADMIN — AMIODARONE HYDROCHLORIDE 200 MILLIGRAM(S): 200 TABLET ORAL at 05:49

## 2025-01-04 RX ADMIN — Medication 2: at 12:54

## 2025-01-04 RX ADMIN — CHLORHEXIDINE GLUCONATE 1 APPLICATION(S): 1.2 RINSE ORAL at 05:20

## 2025-01-04 RX ADMIN — EZETIMIBE 10 MILLIGRAM(S): 10 TABLET ORAL at 13:17

## 2025-01-04 RX ADMIN — HEPARIN SODIUM 900 UNIT(S)/HR: 1000 INJECTION, SOLUTION INTRAVENOUS; SUBCUTANEOUS at 05:19

## 2025-01-04 RX ADMIN — Medication 100 MILLIGRAM(S): at 18:43

## 2025-01-04 RX ADMIN — HEPARIN SODIUM 700 UNIT(S)/HR: 1000 INJECTION, SOLUTION INTRAVENOUS; SUBCUTANEOUS at 16:43

## 2025-01-04 RX ADMIN — HEPARIN SODIUM 700 UNIT(S)/HR: 1000 INJECTION, SOLUTION INTRAVENOUS; SUBCUTANEOUS at 20:11

## 2025-01-04 RX ADMIN — Medication 100 MILLIGRAM(S): at 05:21

## 2025-01-04 RX ADMIN — Medication 75 MILLIGRAM(S): at 13:22

## 2025-01-04 RX ADMIN — NOREPINEPHRINE BITARTRATE 7.7 MICROGRAM(S)/KG/MIN: 1 INJECTION INTRAVENOUS at 02:49

## 2025-01-04 RX ADMIN — Medication 75 MILLIGRAM(S): at 18:44

## 2025-01-04 RX ADMIN — FENTANYL 4.11 MICROGRAM(S)/KG/HR: 75 PATCH, EXTENDED RELEASE TRANSDERMAL at 02:49

## 2025-01-04 RX ADMIN — IPRATROPIUM BROMIDE AND ALBUTEROL SULFATE 3 MILLILITER(S): .5; 2.5 SOLUTION RESPIRATORY (INHALATION) at 19:59

## 2025-01-04 RX ADMIN — BUMETANIDE 2 MILLIGRAM(S): 2 TABLET ORAL at 05:49

## 2025-01-04 RX ADMIN — CHLORHEXIDINE GLUCONATE 15 MILLILITER(S): 1.2 RINSE ORAL at 05:20

## 2025-01-04 RX ADMIN — Medication 3: at 08:33

## 2025-01-04 RX ADMIN — Medication 75 MILLIGRAM(S): at 23:11

## 2025-01-04 RX ADMIN — CLOPIDOGREL BISULFATE 75 MILLIGRAM(S): 75 TABLET, FILM COATED ORAL at 13:17

## 2025-01-04 NOTE — PROGRESS NOTE ADULT - SUBJECTIVE AND OBJECTIVE BOX
Pt seen,, sleepy but calm    T(F): , Max: 100.9 (01-04-25 @ 02:00)  HR: 108 (01-04-25 @ 18:00) (81 - 108)  BP: 132/60 (01-04-25 @ 19:00)  RR: 17 (01-04-25 @ 19:00)  SpO2: 97% (01-04-25 @ 19:00)  IN: 1633.4 mL / OUT: 1115 mL / NET: 518.4 mL    IN: 242 mL / OUT: 1240 mL / NET: -998 mL      General: No apparent distress  Cardiovascular: S1, S2  Gastrointestinal: Soft, Non-tender, Non-distended  Respiratory: Good air entry bilaterally, on NC  Musculoskeletal: Moves all extremities  Lymphatic: No edema  Neurologic: somnelent but arrousable  Dermatologic: Skin dry  PT/INR - ( 03 Jan 2025 11:40 )   PT: 21.80 sec;   INR: 1.82 ratio         PTT - ( 04 Jan 2025 14:30 )  PTT:104.7 sec                        14.2   15.76 )-----------( 356      ( 04 Jan 2025 05:58 )             45.2     01-04    144  |  102  |  71[HH]  ----------------------------<  283[H]  3.8   |  28  |  2.2[H]    Ca    8.8      04 Jan 2025 05:58  Phos  4.5     01-04  Mg     2.3     01-04    TPro  5.2[L]  /  Alb  2.9[L]  /  TBili  0.5  /  DBili  x   /  AST  338[H]  /  ALT  543[H]  /  AlkPhos  72  01-04

## 2025-01-04 NOTE — PROGRESS NOTE ADULT - SUBJECTIVE AND OBJECTIVE BOX
Patient is a 69y old  Male who presents with a chief complaint of CHF exacerbation (03 Jan 2025 18:20)      Over Night Events:  Patient seen and examined.   on vent   on fentanyl propofol and levo   failed SBT yesterday     ROS:  See HPI    PHYSICAL EXAM    ICU Vital Signs Last 24 Hrs  T(C): 38.2 (04 Jan 2025 04:00), Max: 39.3 (03 Jan 2025 07:00)  T(F): 100.8 (04 Jan 2025 04:00), Max: 102.7 (03 Jan 2025 07:00)  HR: 84 (04 Jan 2025 04:00) (81 - 105)  BP: 98/51 (04 Jan 2025 04:00) (75/50 - 144/66)  BP(mean): 70 (04 Jan 2025 04:00) (59 - 96)  ABP: --  ABP(mean): --  RR: 17 (04 Jan 2025 04:00) (17 - 51)  SpO2: 100% (04 Jan 2025 04:00) (99% - 100%)    O2 Parameters below as of 03 Jan 2025 18:00  Patient On (Oxygen Delivery Method): ventilator            General: open eyes   HEENT:       et tube          Lymph Nodes: NO cervical LN   Lungs: Bilateral BS  Cardiovascular: Regular   Abdomen: Soft, Positive BS  Extremities: No clubbing   Skin: warm   Neurological: no focal   Musculoskeletal: move all ext     I&O's Detail    02 Jan 2025 07:01  -  03 Jan 2025 07:00  --------------------------------------------------------  IN:    Dexmedetomidine: 577 mL    FentaNYL: 132.1 mL    IV PiggyBack: 100 mL    Norepinephrine: 306.8 mL  Total IN: 1115.9 mL    OUT:    Indwelling Catheter - Urethral (mL): 3110 mL  Total OUT: 3110 mL    Total NET: -1994.1 mL      03 Jan 2025 07:01  -  04 Jan 2025 06:26  --------------------------------------------------------  IN:    Dexmedetomidine: 124.5 mL    Enteral Tube Flush: 200 mL    FentaNYL: 97 mL    Heparin Infusion: 222 mL    IV PiggyBack: 50 mL    Jevity 1.2: 500 mL    Norepinephrine: 205.3 mL    Propofol: 70 mL  Total IN: 1468.8 mL    OUT:    Dexmedetomidine: 0 mL    Indwelling Catheter - Urethral (mL): 1115 mL  Total OUT: 1115 mL    Total NET: 353.8 mL          LABS:                          14.0   13.35 )-----------( 341      ( 03 Jan 2025 19:45 )             44.5         03 Jan 2025 05:34    144    |  103    |  53     ----------------------------<  161    4.2     |  26     |  1.8      Ca    8.6        03 Jan 2025 05:34  Phos  3.9       03 Jan 2025 05:34  Mg     2.1       03 Jan 2025 05:34    TPro  5.2    /  Alb  2.8    /  TBili  0.6    /  DBili  0.2    /  AST  1585   /  ALT  967    /  AlkPhos  78     03 Jan 2025 10:33  Amylase x     lipase x                                                 PT/INR - ( 03 Jan 2025 11:40 )   PT: 21.80 sec;   INR: 1.82 ratio         PTT - ( 04 Jan 2025 03:55 )  PTT:>200.0 sec                                       Urinalysis Basic - ( 03 Jan 2025 05:34 )    Color: x / Appearance: x / SG: x / pH: x  Gluc: 161 mg/dL / Ketone: x  / Bili: x / Urobili: x   Blood: x / Protein: x / Nitrite: x   Leuk Esterase: x / RBC: x / WBC x   Sq Epi: x / Non Sq Epi: x / Bacteria: x                                                                                                             Mode: AC/ CMV (Assist Control/ Continuous Mandatory Ventilation)  RR (machine): 16  TV (machine): 400  FiO2: 40  PEEP: 8  ITime: 1  MAP: 13  PIP: 23                                      ABG - ( 04 Jan 2025 03:21 )  pH, Arterial: 7.48  pH, Blood: x     /  pCO2: 47    /  pO2: 167   / HCO3: 35    / Base Excess: 10.0  /  SaO2: 98.5                MEDICATIONS  (STANDING):  aMIOdarone    Tablet 200 milliGRAM(s) Oral daily  buMETAnide Injectable 2 milliGRAM(s) IV Push once  buMETAnide Injectable 2 milliGRAM(s) IV Push daily  cefepime   IVPB      cefepime   IVPB 2000 milliGRAM(s) IV Intermittent every 12 hours  chlorhexidine 0.12% Liquid 15 milliLiter(s) Oral Mucosa every 12 hours  chlorhexidine 2% Cloths 1 Application(s) Topical <User Schedule>  clopidogrel Tablet 75 milliGRAM(s) Oral daily  dexMEDEtomidine Infusion 0.2 MICROgram(s)/kG/Hr (4.11 mL/Hr) IV Continuous <Continuous>  dextrose 5%. 1000 milliLiter(s) (50 mL/Hr) IV Continuous <Continuous>  dextrose 50% Injectable 25 Gram(s) IV Push once  dextrose 50% Injectable 12.5 Gram(s) IV Push once  dextrose 50% Injectable 25 Gram(s) IV Push once  ezetimibe 10 milliGRAM(s) Oral daily  fenofibrate Tablet 145 milliGRAM(s) Oral daily  fentaNYL   Infusion. 0.501 MICROgram(s)/kG/Hr (4.11 mL/Hr) IV Continuous <Continuous>  glucagon  Injectable 1 milliGRAM(s) IntraMuscular once  heparin  Infusion. 1500 Unit(s)/Hr (15 mL/Hr) IV Continuous <Continuous>  insulin glargine Injectable (LANTUS) 10 Unit(s) SubCutaneous at bedtime  insulin lispro (ADMELOG) corrective regimen sliding scale   SubCutaneous three times a day before meals  metoprolol tartrate 75 milliGRAM(s) Oral every 6 hours  norepinephrine Infusion 0.05 MICROgram(s)/kG/Min (7.7 mL/Hr) IV Continuous <Continuous>  polyethylene glycol 3350 17 Gram(s) Oral two times a day  propofol Infusion 10 MICROgram(s)/kG/Min (4.93 mL/Hr) IV Continuous <Continuous>  senna 2 Tablet(s) Oral at bedtime    MEDICATIONS  (PRN):  albuterol/ipratropium for Nebulization 3 milliLiter(s) Nebulizer every 6 hours PRN Shortness of Breath and/or Wheezing  aluminum hydroxide/magnesium hydroxide/simethicone Suspension 30 milliLiter(s) Oral every 4 hours PRN Dyspepsia  dextrose Oral Gel 15 Gram(s) Oral once PRN Blood Glucose LESS THAN 70 milliGRAM(s)/deciliter  hydrOXYzine hydrochloride 25 milliGRAM(s) Oral every 6 hours PRN Agitation  melatonin 3 milliGRAM(s) Oral at bedtime PRN Insomnia  ondansetron Injectable 4 milliGRAM(s) IV Push every 8 hours PRN Nausea and/or Vomiting  zolpidem 5 milliGRAM(s) Oral at bedtime PRN Insomnia          Xrays: rotated bibasilar opacity small effusion                                                                                   ECHO:  [facility] ICU:

## 2025-01-04 NOTE — PROGRESS NOTE ADULT - ASSESSMENT
IMPRESSION:    Acute Hypoxemic Respiratory Failure   Pulmonary Edema  Acute Decompensated HFmrEF   Toxic Metabolic Encephalopathy   Persistent AFIB awaiting Albation   KAREN - improving.   Transaminitis  HO ICM s/p CRT-D  HO Bioprosthetic AV  HO CVA  HO DM   HO COPD     PLAN:    CNS: Sedation with fentanyl and precedex. Daily SAT. CT Head 12/31 noted.     HEENT: Oral and ETT care. ETT day #4.     PULMONARY:  HOB @ 45 degrees.  Aspiration precautions ABG reviewed, decrease RR to 16, . CXR with bilateral opacities, improving. SBT with intent to extubate to BiPAP if passes.    CARDIOVASCULAR: Volume contraction as tolerated, optimize cardiac therapy,   Bumex 2mg Q24 hrs if remain positive balance can give 1 mg at night   keep is < os    Rate control, Cardiology follow up, TTE reviewed, proBNP noted. Wean levophed as tolerated, target SpO2 92-96%.   Check EKG.     GI: GI prophylaxis. If no extubation, OG feeds. Bowel regimen. Trend LFTs yesterday increased follow  . RUQ US. DC statin. GI evaluation.   do hepatitis profile   RENAL:  Follow up lytes.  Correct as needed.  Consult nephrology. Monitor UO.   follow BUn, cr   INFECTIOUS DISEASE: Follow up cultures, ABX per ID: cefepime. RVP negative. UA noted.     HEMATOLOGICAL: Full AC. Monitor CBC and coags. target PTT around 70     ENDOCRINE:  Follow up FS.  Insulin protocol if needed.    MUSCULOSKELETAL: bedrest for now    Goals of care     MICU monitoring

## 2025-01-04 NOTE — PROGRESS NOTE ADULT - ASSESSMENT
69-year-old male with past medical history of chronic HFrEF s/p ICD, AVR porcine, HTN, PAF s/p ablation, CAD s/p PCI, DM, HLD, COPD (no home O2), smoker presenting with worsening shortness of breath and weakness with concern for acute on chronic CHF exacerbation in the setting of persistent afib        Acute Hypoxemic Respiratory Failure due to pulmonary edema due to acute on chronic HFmrEF   - self extubated today  - then placed on bipap, which pt also removed on his own  - now stable on NC    Persistent AFIB   - Ablation as oupt    KAREN   - stable    Transaminitis   - probably HF related, liver congestion    ICM s/p CRT-D  - stable    Bioprosthetic AV  - stable    CVA hx  - stable    COPD   - stable    if remainds on NC, may downgrade on sunday

## 2025-01-04 NOTE — CHART NOTE - NSCHARTNOTEFT_GEN_A_CORE
Patient was initially intubated for respiratory failure.  Today he passed SAT   Was on SBT, but self extubated at around 8.40 AM.  Patient was HD stable and saturating 100% on room air.  Placed On AVPAS for support given the hx of HF and p.edema.  No indication to re-intubate the patient at this point.  Will get ABG in 30 min.     ICU attending on call informed

## 2025-01-05 LAB
ALBUMIN SERPL ELPH-MCNC: 3 G/DL — LOW (ref 3.5–5.2)
ALP SERPL-CCNC: 78 U/L — SIGNIFICANT CHANGE UP (ref 30–115)
ALT FLD-CCNC: 357 U/L — HIGH (ref 0–41)
ANION GAP SERPL CALC-SCNC: 17 MMOL/L — HIGH (ref 7–14)
APTT BLD: 55.5 SEC — HIGH (ref 27–39.2)
APTT BLD: 58.2 SEC — HIGH (ref 27–39.2)
APTT BLD: 58.9 SEC — HIGH (ref 27–39.2)
AST SERPL-CCNC: 128 U/L — HIGH (ref 0–41)
BASOPHILS # BLD AUTO: 0.06 K/UL — SIGNIFICANT CHANGE UP (ref 0–0.2)
BASOPHILS NFR BLD AUTO: 0.4 % — SIGNIFICANT CHANGE UP (ref 0–1)
BILIRUB SERPL-MCNC: 0.8 MG/DL — SIGNIFICANT CHANGE UP (ref 0.2–1.2)
BUN SERPL-MCNC: 67 MG/DL — CRITICAL HIGH (ref 10–20)
CALCIUM SERPL-MCNC: 9.3 MG/DL — SIGNIFICANT CHANGE UP (ref 8.4–10.5)
CHLORIDE SERPL-SCNC: 100 MMOL/L — SIGNIFICANT CHANGE UP (ref 98–110)
CO2 SERPL-SCNC: 28 MMOL/L — SIGNIFICANT CHANGE UP (ref 17–32)
CREAT SERPL-MCNC: 2.5 MG/DL — HIGH (ref 0.7–1.5)
EGFR: 27 ML/MIN/1.73M2 — LOW
EOSINOPHIL # BLD AUTO: 0.23 K/UL — SIGNIFICANT CHANGE UP (ref 0–0.7)
EOSINOPHIL NFR BLD AUTO: 1.5 % — SIGNIFICANT CHANGE UP (ref 0–8)
GLUCOSE BLDC GLUCOMTR-MCNC: 188 MG/DL — HIGH (ref 70–99)
GLUCOSE BLDC GLUCOMTR-MCNC: 190 MG/DL — HIGH (ref 70–99)
GLUCOSE BLDC GLUCOMTR-MCNC: 196 MG/DL — HIGH (ref 70–99)
GLUCOSE BLDC GLUCOMTR-MCNC: 211 MG/DL — HIGH (ref 70–99)
GLUCOSE SERPL-MCNC: 218 MG/DL — HIGH (ref 70–99)
HAV IGM SER-ACNC: SIGNIFICANT CHANGE UP
HBV CORE IGM SER-ACNC: SIGNIFICANT CHANGE UP
HBV SURFACE AG SER-ACNC: SIGNIFICANT CHANGE UP
HCT VFR BLD CALC: 41.3 % — LOW (ref 42–52)
HCV AB S/CO SERPL IA: 0.08 S/CO — SIGNIFICANT CHANGE UP (ref 0–0.99)
HCV AB SERPL-IMP: SIGNIFICANT CHANGE UP
HGB BLD-MCNC: 13.1 G/DL — LOW (ref 14–18)
HIV 1+2 AB+HIV1 P24 AG SERPL QL IA: SIGNIFICANT CHANGE UP
IMM GRANULOCYTES NFR BLD AUTO: 0.6 % — HIGH (ref 0.1–0.3)
LYMPHOCYTES # BLD AUTO: 1.92 K/UL — SIGNIFICANT CHANGE UP (ref 1.2–3.4)
LYMPHOCYTES # BLD AUTO: 12.5 % — LOW (ref 20.5–51.1)
MAGNESIUM SERPL-MCNC: 2.3 MG/DL — SIGNIFICANT CHANGE UP (ref 1.8–2.4)
MCHC RBC-ENTMCNC: 28.5 PG — SIGNIFICANT CHANGE UP (ref 27–31)
MCHC RBC-ENTMCNC: 31.7 G/DL — LOW (ref 32–37)
MCV RBC AUTO: 90 FL — SIGNIFICANT CHANGE UP (ref 80–94)
MONOCYTES # BLD AUTO: 1.2 K/UL — HIGH (ref 0.1–0.6)
MONOCYTES NFR BLD AUTO: 7.8 % — SIGNIFICANT CHANGE UP (ref 1.7–9.3)
NEUTROPHILS # BLD AUTO: 11.86 K/UL — HIGH (ref 1.4–6.5)
NEUTROPHILS NFR BLD AUTO: 77.2 % — HIGH (ref 42.2–75.2)
NRBC # BLD: 0 /100 WBCS — SIGNIFICANT CHANGE UP (ref 0–0)
PHOSPHATE SERPL-MCNC: 3.6 MG/DL — SIGNIFICANT CHANGE UP (ref 2.1–4.9)
PLATELET # BLD AUTO: 283 K/UL — SIGNIFICANT CHANGE UP (ref 130–400)
PMV BLD: 12 FL — HIGH (ref 7.4–10.4)
POTASSIUM SERPL-MCNC: 3.5 MMOL/L — SIGNIFICANT CHANGE UP (ref 3.5–5)
POTASSIUM SERPL-SCNC: 3.5 MMOL/L — SIGNIFICANT CHANGE UP (ref 3.5–5)
PROT SERPL-MCNC: 5.5 G/DL — LOW (ref 6–8)
RBC # BLD: 4.59 M/UL — LOW (ref 4.7–6.1)
RBC # FLD: 13.4 % — SIGNIFICANT CHANGE UP (ref 11.5–14.5)
SODIUM SERPL-SCNC: 145 MMOL/L — SIGNIFICANT CHANGE UP (ref 135–146)
WBC # BLD: 15.36 K/UL — HIGH (ref 4.8–10.8)
WBC # FLD AUTO: 15.36 K/UL — HIGH (ref 4.8–10.8)

## 2025-01-05 PROCEDURE — 71045 X-RAY EXAM CHEST 1 VIEW: CPT | Mod: 26

## 2025-01-05 PROCEDURE — 99233 SBSQ HOSP IP/OBS HIGH 50: CPT

## 2025-01-05 PROCEDURE — 99291 CRITICAL CARE FIRST HOUR: CPT

## 2025-01-05 RX ORDER — BUMETANIDE 2 MG/1
1 TABLET ORAL DAILY
Refills: 0 | Status: DISCONTINUED | OUTPATIENT
Start: 2025-01-06 | End: 2025-01-08

## 2025-01-05 RX ORDER — HEPARIN SODIUM 1000 [USP'U]/ML
1900 INJECTION, SOLUTION INTRAVENOUS; SUBCUTANEOUS
Qty: 25000 | Refills: 0 | Status: DISCONTINUED | OUTPATIENT
Start: 2025-01-05 | End: 2025-01-06

## 2025-01-05 RX ADMIN — FENOFIBRATE 145 MILLIGRAM(S): 48 TABLET ORAL at 12:39

## 2025-01-05 RX ADMIN — INSULIN GLARGINE-YFGN 10 UNIT(S): 100 INJECTION, SOLUTION SUBCUTANEOUS at 22:44

## 2025-01-05 RX ADMIN — Medication 100 MILLIGRAM(S): at 05:01

## 2025-01-05 RX ADMIN — HEPARIN SODIUM 900 UNIT(S)/HR: 1000 INJECTION, SOLUTION INTRAVENOUS; SUBCUTANEOUS at 07:46

## 2025-01-05 RX ADMIN — IPRATROPIUM BROMIDE AND ALBUTEROL SULFATE 3 MILLILITER(S): .5; 2.5 SOLUTION RESPIRATORY (INHALATION) at 19:25

## 2025-01-05 RX ADMIN — BUMETANIDE 2 MILLIGRAM(S): 2 TABLET ORAL at 05:01

## 2025-01-05 RX ADMIN — AMIODARONE HYDROCHLORIDE 200 MILLIGRAM(S): 200 TABLET ORAL at 05:01

## 2025-01-05 RX ADMIN — Medication 75 MILLIGRAM(S): at 17:26

## 2025-01-05 RX ADMIN — EZETIMIBE 10 MILLIGRAM(S): 10 TABLET ORAL at 12:39

## 2025-01-05 RX ADMIN — CLOPIDOGREL BISULFATE 75 MILLIGRAM(S): 75 TABLET, FILM COATED ORAL at 12:39

## 2025-01-05 RX ADMIN — Medication 75 MILLIGRAM(S): at 12:38

## 2025-01-05 RX ADMIN — IPRATROPIUM BROMIDE AND ALBUTEROL SULFATE 3 MILLILITER(S): .5; 2.5 SOLUTION RESPIRATORY (INHALATION) at 15:40

## 2025-01-05 RX ADMIN — CHLORHEXIDINE GLUCONATE 1 APPLICATION(S): 1.2 RINSE ORAL at 05:05

## 2025-01-05 RX ADMIN — HEPARIN SODIUM 900 UNIT(S)/HR: 1000 INJECTION, SOLUTION INTRAVENOUS; SUBCUTANEOUS at 16:11

## 2025-01-05 RX ADMIN — Medication 17 GRAM(S): at 17:27

## 2025-01-05 RX ADMIN — Medication 1: at 11:48

## 2025-01-05 RX ADMIN — Medication 1: at 07:43

## 2025-01-05 RX ADMIN — Medication 100 MILLIGRAM(S): at 17:27

## 2025-01-05 RX ADMIN — Medication 75 MILLIGRAM(S): at 05:04

## 2025-01-05 RX ADMIN — Medication 1: at 17:27

## 2025-01-05 RX ADMIN — HEPARIN SODIUM 900 UNIT(S)/HR: 1000 INJECTION, SOLUTION INTRAVENOUS; SUBCUTANEOUS at 15:15

## 2025-01-05 NOTE — CONSULT NOTE ADULT - SUBJECTIVE AND OBJECTIVE BOX
NEPHROLOGY CONSULTATION NOTE     69-year-old male with past medical history of chronic HFrEF s/p ICD, AVR porcine, HTN, PAF s/p ablation, CAD s/p PCI, DM, HLD, COPD (no home O2), smoker presented 12/26  with worsening shortness of breath and weakness.  Patient was admitted within the last 24 hours for acute on chronic CHF exacerbation, was given IV Lasix and BiPAP, and left AMA.  Returned 12/27  stating that he became more short of breath last night after leaving the hospital and would like to be readmitted for treatment.  Denies fever/chills, chest pain, abdominal pain, calf pain, N/V/D/C, cough, and nasal congestion.      presented w/ Cr 1.5 ( looking carolyn khas some CKD (eGFR < 60 since at least October 2024)     Cr hua acutly 12/31 1.1 - > 1.7 was stabel ~ 2 for following week now up a bit     PAST MEDICAL & SURGICAL HISTORY:  CHF (congestive heart failure)      Diabetes      CAD (coronary artery disease)      Chronic obstructive pulmonary disease (COPD)      HTN (hypertension)      Stented coronary artery      Dyslipidemia      GERD (gastroesophageal reflux disease)      Afib      CVA (cerebral vascular accident)      H/O heart artery stent      Heart valve replaced  aortic      History of Nissen fundoplication        Allergies:  sulfa drugs (Unknown)    Home Medications Reviewed  Hospital Medications:   MEDICATIONS  (STANDING):  albuterol/ipratropium for Nebulization 3 milliLiter(s) Nebulizer every 6 hours  aMIOdarone    Tablet 200 milliGRAM(s) Oral daily  cefepime   IVPB 1000 milliGRAM(s) IV Intermittent every 12 hours  chlorhexidine 2% Cloths 1 Application(s) Topical <User Schedule>  clopidogrel Tablet 75 milliGRAM(s) Oral daily  dextrose 5%. 1000 milliLiter(s) (50 mL/Hr) IV Continuous <Continuous>  dextrose 50% Injectable 25 Gram(s) IV Push once  dextrose 50% Injectable 12.5 Gram(s) IV Push once  dextrose 50% Injectable 25 Gram(s) IV Push once  ezetimibe 10 milliGRAM(s) Oral daily  fenofibrate Tablet 145 milliGRAM(s) Oral daily  glucagon  Injectable 1 milliGRAM(s) IntraMuscular once  heparin  Infusion. 1500 Unit(s)/Hr (15 mL/Hr) IV Continuous <Continuous>  insulin glargine Injectable (LANTUS) 10 Unit(s) SubCutaneous at bedtime  insulin lispro (ADMELOG) corrective regimen sliding scale   SubCutaneous three times a day before meals  metoprolol tartrate 75 milliGRAM(s) Oral every 6 hours  polyethylene glycol 3350 17 Gram(s) Oral two times a day  senna 2 Tablet(s) Oral at bedtime      SOCIAL HISTORY:  Denies ETOH,Smoking,   FAMILY HISTORY:        REVIEW OF SYSTEMS:  CONSTITUTIONAL: No weakness, fevers or chills  EYES/ENT: No visual changes;  No vertigo or throat pain   NECK: No pain or stiffness  RESPIRATORY: No cough, wheezing, hemoptysis; No shortness of breath  CARDIOVASCULAR: No chest pain or palpitations.  GASTROINTESTINAL: No abdominal or epigastric pain. No nausea, vomiting, or hematemesis; No diarrhea or constipation. No melena or hematochezia.  GENITOURINARY: No dysuria, frequency, foamy urine, urinary urgency, incontinence or hematuria  NEUROLOGICAL: No numbness or weakness  SKIN: No itching, burning, rashes, or lesions   VASCULAR: No bilateral lower extremity edema.   All other review of systems is negative unless indicated above.    VITALS:  T(F): 97 (01-05-25 @ 07:07), Max: 98.2 (01-04-25 @ 23:05)  HR: 93 (01-05-25 @ 07:24)  BP: 123/79 (01-05-25 @ 07:24)  RR: 17 (01-05-25 @ 07:24)  SpO2: 100% (01-05-25 @ 07:24)    01-04 @ 07:01  -  01-05 @ 07:00  --------------------------------------------------------  IN: 433 mL / OUT: 1990 mL / NET: -1557 mL          01-04-25 @ 07:01  -  01-05-25 @ 07:00  --------------------------------------------------------  IN: 0 mL / OUT: 1990 mL / NET: -1990 mL      I&O's Detail    04 Jan 2025 07:01  -  05 Jan 2025 07:00  --------------------------------------------------------  IN:    FentaNYL: 7 mL    Heparin Infusion: 188 mL    IV PiggyBack: 200 mL    Norepinephrine: 38 mL  Total IN: 433 mL    OUT:    Indwelling Catheter - Urethral (mL): 1990 mL  Total OUT: 1990 mL    Total NET: -1557 mL            PHYSICAL EXAM:  Constitutional: NAD  HEENT: anicteric sclera, oropharynx clear, MMM  Neck: No JVD  Respiratory: CTAB, no wheezes, rales or rhonchi  Cardiovascular: S1, S2, RRR  Gastrointestinal: BS+, soft, NT/ND  Extremities: No cyanosis or clubbing. No peripheral edema  Neurological: A/O x 3, no focal deficits  Psychiatric: Normal mood, normal affect  : No CVA tenderness. +iyer.   Skin: No rashes  Vascular Access:    LABS:  01-05    145  |  100  |  67[HH]  ----------------------------<  218[H]  3.5   |  28  |  2.5[H]    Ca    9.3      05 Jan 2025 05:53  Phos  3.6     01-05  Mg     2.3     01-05    TPro  5.5[L]  /  Alb  3.0[L]  /  TBili  0.8  /  DBili      /  AST  128[H]  /  ALT  357[H]  /  AlkPhos  78  01-05    Creatinine Trend: 2.5 <--, 2.2 <--, 1.8 <--, 1.7 <--, 1.9 <--, 2.3 <--, 2.0 <--, 1.7 <--, 1.1 <--, 1.2 <--, 1.2 <--, 1.2 <--, 1.3 <--, 1.4 <--, 1.5 <--, 1.6 <--                        13.1   15.36 )-----------( 283      ( 05 Jan 2025 05:53 )             41.3     Urine Studies:  Urinalysis Basic - ( 05 Jan 2025 05:53 )    Color:  / Appearance:  / SG:  / pH:   Gluc: 218 mg/dL / Ketone:   / Bili:  / Urobili:    Blood:  / Protein:  / Nitrite:    Leuk Esterase:  / RBC:  / WBC    Sq Epi:  / Non Sq Epi:  / Bacteria:                 RADIOLOGY & ADDITIONAL STUDIES:

## 2025-01-05 NOTE — SWALLOW BEDSIDE ASSESSMENT ADULT - SLP PERTINENT HISTORY OF CURRENT PROBLEM
This is a 69-year-old male with past medical history of chronic HFrEF s/p ICD, AVR porcine, HTN, PAF s/p ablation, CAD s/p PCI, DM, HLD, COPD (no home O2), smoker presenting with worsening shortness of breath and weakness.  Patient was admitted within the last 24 hours for acute on chronic CHF exacerbation, was given IV Lasix and BiPAP, and left AMA.  Patient returns stating that he became more short of breath last night after leaving the hospital and would like to be readmitted for treatment.  Denies fever/chills, chest pain, abdominal pain, calf pain, N/V/D/C, cough, and nasal congestion.

## 2025-01-05 NOTE — PROGRESS NOTE ADULT - ASSESSMENT
69-year-old male with past medical history of chronic HFrEF s/p ICD, AVR porcine, HTN, PAF s/p ablation, CAD s/p PCI, DM, HLD, COPD (no home O2), smoker presenting with worsening shortness of breath and weakness with concern for acute on chronic CHF exacerbation in the setting of persistent afib        Acute Hypoxemic Respiratory Failure due to pulmonary edema due to acute on chronic HFmrEF   - self extubated   - then placed on bipap, which pt also removed   - stable on NC  - cefepime  - bumex iv    Persistent AFIB   - Ablation as oupt    KAREN   - stable    Transaminitis   - probably HF related, liver congestion  - resolving    ICM s/p CRT-D  - stable    Bioprosthetic AV  - stable    CVA hx  - stable    COPD   - stable    stable for downgrade to floor

## 2025-01-05 NOTE — CONSULT NOTE ADULT - ASSESSMENT
69-year-old male with past medical history of chronic HFrEF s/p ICD, AVR porcine, HTN, PAF s/p ablation, CAD s/p PCI, DM, HLD, COPD (no home O2), smoker presented 12/26  with worsening shortness of breath and weakness.   presented w/ Cr 1.5 ( looking back has some CKD (eGFR < 60 since at least October 2024)     Cr hua acutely 12/31 1.1 - > 1.7 was stable ~ 2 for following week now up a bit     Did not recive IV contrast, was on Levophed so likely has ATN  No urine studies available, iyer has gross hematuria (? from placement he denies hematuria prior to iyer)   No hydro on Rt kidney on Abd US 1/3       Suggest  - send UA andprot/cr ratio (hematuria noted ? from iyer placment)  - agree /w holdign diuretic for now,  - BP wwell controlled, is off LEvo  - Will follow

## 2025-01-05 NOTE — CHART NOTE - NSCHARTNOTEFT_GEN_A_CORE
Registered Dietitian Follow-Up     Patient Profile Reviewed                           Yes [X]   No []     Nutrition History Previously Obtained        Yes [X]  No []       Pertinent Information:  pt is 69 year old male with hx of Chronic HFrEF s/p ICD, AVR porcine, HTN, PAVF s/p ablation, CAD, DM, COPD + smoker p/w SOB and weakness. pt admitted with acute CHF exacerbation vs PNA, elevated trops.  s/p RR due to hypoxia, intubation warranted with upgrade to ICU.   s/p self extuabation  SLP eval today with recommendation of soft and bite sized diet with thins    Diet, Soft and Bite Sized:   1200mL Fluid Restriction ([id number]) (25 @ 11:52) [Active]    HT: 160 cm  Daily Weight in k.3 (), Weight in k.4 (), Weight in k.4 (), Weight in k.2 (), Weight in k.5 (), Weight in k.5 ()  % Weight Change    MEDICATIONS  (STANDING):  albuterol/ipratropium for Nebulization 3 milliLiter(s) Nebulizer every 6 hours  aMIOdarone    Tablet 200 milliGRAM(s) Oral daily  cefepime   IVPB 1000 milliGRAM(s) IV Intermittent every 12 hours  chlorhexidine 2% Cloths 1 Application(s) Topical <User Schedule>  clopidogrel Tablet 75 milliGRAM(s) Oral daily  dextrose 5%. 1000 milliLiter(s) (50 mL/Hr) IV Continuous <Continuous>  ezetimibe 10 milliGRAM(s) Oral daily  fenofibrate Tablet 145 milliGRAM(s) Oral daily  insulin glargine Injectable (LANTUS) 10 Unit(s) SubCutaneous at bedtime  insulin lispro (ADMELOG) corrective regimen sliding scale   SubCutaneous three times a day before meals  metoprolol tartrate 75 milliGRAM(s) Oral every 6 hours  polyethylene glycol 3350 17 Gram(s) Oral two times a day  senna 2 Tablet(s) Oral at bedtime    MEDICATIONS  (PRN):  albuterol/ipratropium for Nebulization 3 milliLiter(s) Nebulizer every 6 hours PRN Shortness of Breath and/or Wheezing  aluminum hydroxide/magnesium hydroxide/simethicone Suspension 30 milliLiter(s) Oral every 4 hours PRN Dyspepsia  dextrose Oral Gel 15 Gram(s) Oral once PRN Blood Glucose LESS THAN 70 milliGRAM(s)/deciliter  hydrOXYzine hydrochloride 25 milliGRAM(s) Oral every 6 hours PRN Agitation  melatonin 3 milliGRAM(s) Oral at bedtime PRN Insomnia  ondansetron Injectable 4 milliGRAM(s) IV Push every 8 hours PRN Nausea and/or Vomiting  zolpidem 5 milliGRAM(s) Oral at bedtime PRN Insomnia    Pertinent Labs:  @ 05:53: Na 145, BUN 67[HH], Cr 2.5[H], [H], K+ 3.5, Phos 3.6, Mg 2.3, Alk Phos 78, ALT/SGPT 357[H], AST/SGOT 128[H], HbA1c --    Finger Sticks:  POCT Blood Glucose.: 211 mg/dL ( @ 21:48)  POCT Blood Glucose.: 190 mg/dL ( @ 16:43)  POCT Blood Glucose.: 188 mg/dL ( @ 11:23)  POCT Blood Glucose.: 196 mg/dL ( @ 07:35)    Physical Findings:  - Appearance: alert, orientated   - GI function: +BS, BM noted  and today  - Tubes: n/a   - Oral/Mouth cavity:  - Skin: intact   - Edema: generalized      Nutrition Requirements:  Weight Used: 75.4 kgs      Estimated Energy Needs   3668-3230 kcals (20-25 kcal/kg/BW)  75.4-90g protein (1-1.2g/kg/BW)  1200 ml fluid as per MD       Nutrient Intake: pt tolerated po diet well consumes >50% of meal    [] Previous Nutrition Diagnosis:            [] Ongoing          [X] Resolved    inadequate pro-energy intake resolved     Goal/Expected Outcome: pt to continue to tolerate po diet and meet at least 80% of estimated needs within 5-7 days      Indicator/Monitoring: tolerance to po diet, labs/meds, NFPF     Recommendation:   -add CHO Consistent DASH/TLC restriction to soft and bite 1200 ml FR diet order  moderate risk

## 2025-01-05 NOTE — PROGRESS NOTE ADULT - SUBJECTIVE AND OBJECTIVE BOX
Pt seen, says he feels great and wants to get out of bed  Pt at bedside, preparing to get him to chair    T(F): , Max: 98.4 (01-05-25 @ 15:37)  HR: 105 (01-05-25 @ 20:01) (93 - 115)  BP: 121/75 (01-05-25 @ 20:01)  RR: 17 (01-05-25 @ 07:24)  SpO2: 96% (01-05-25 @ 18:00)  IN: 433 mL / OUT: 1990 mL / NET: -1557 mL    IN: 0 mL / OUT: 750 mL / NET: -750 mL      General: No apparent distress  Cardiovascular: S1, S2  Gastrointestinal: Soft, Non-tender, Non-distended  Respiratory: Good air entry bilaterally, on NC  Musculoskeletal: Moves all extremities  Lymphatic: No edema  Neurologic: No gross motor deficit  Dermatologic: Skin dry  PTT - ( 05 Jan 2025 15:22 )  PTT:58.2 sec                        13.1   15.36 )-----------( 283      ( 05 Jan 2025 05:53 )             41.3     01-05    145  |  100  |  67[HH]  ----------------------------<  218[H]  3.5   |  28  |  2.5[H]    Ca    9.3      05 Jan 2025 05:53  Phos  3.6     01-05  Mg     2.3     01-05    TPro  5.5[L]  /  Alb  3.0[L]  /  TBili  0.8  /  DBili  x   /  AST  128[H]  /  ALT  357[H]  /  AlkPhos  78  01-05

## 2025-01-05 NOTE — PROGRESS NOTE ADULT - ASSESSMENT
IMPRESSION:    Acute Hypoxemic Respiratory Failure   Pulmonary Edema  Acute Decompensated HFmrEF   Toxic Metabolic Encephalopathy   Persistent AFIB awaiting Albation   KAREN -   Transaminitis improving   HO ICM s/p CRT-D  HO Bioprosthetic AV  HO CVA  HO DM   HO COPD     PLAN:    CNS:  no sedation     HEENT: Oral care      PULMONARY:  HOB @ 45 degrees.  keep pox >92%     CARDIOVASCULAR: Volume contraction as tolerated, optimize cardiac therapy,   lower bumex to 1 mg daily   keep is < os    Rate control, Cardiology follow up, TTE reviewed, proBNP noted. Wean levophed as tolerated, target SpO2 92-96%.   Check EKG.     GI: GI prophylaxis. If no extubation, OG feeds. Bowel regimen. Trend LFTs yesterday increased follow  . RUQ US. DC statin. GI evaluation.      RENAL:  Follow up lytes.  Correct as needed.  Consult nephrology. Monitor UO.   follow BUn, cr increased   lower bumex BMP after noon   INFECTIOUS DISEASE: Follow up cultures, ABX per ID: cefepime. RVP negative. UA noted.     HEMATOLOGICAL: Full AC. Monitor CBC and coags. target PTT around 70     ENDOCRINE:  Follow up FS.  Insulin protocol if needed.    MUSCULOSKELETAL: bedrest for now    Goals of care     SDU

## 2025-01-05 NOTE — PROGRESS NOTE ADULT - SUBJECTIVE AND OBJECTIVE BOX
Patient is a 69y old  Male who presents with a chief complaint of CHF exacerbation (04 Jan 2025 14:31)      Over Night Events:  Patient seen and examined.   s/p extubation   did well   awake   on heparin drip     ROS:  See HPI    PHYSICAL EXAM    ICU Vital Signs Last 24 Hrs  T(C): 36.7 (05 Jan 2025 04:00), Max: 37.4 (04 Jan 2025 07:05)  T(F): 98 (05 Jan 2025 04:00), Max: 99.3 (04 Jan 2025 07:05)  HR: 93 (05 Jan 2025 04:00) (82 - 108)  BP: 144/72 (05 Jan 2025 04:00) (89/50 - 151/67)  BP(mean): 98 (05 Jan 2025 04:00) (61 - 104)  ABP: --  ABP(mean): --  RR: 16 (05 Jan 2025 04:00) (10 - 40)  SpO2: 100% (05 Jan 2025 04:00) (91% - 100%)    O2 Parameters below as of 05 Jan 2025 04:00  Patient On (Oxygen Delivery Method): nasal cannula  O2 Flow (L/min): 4          General:awake   HEENT: teodoro               Lymph Nodes: NO cervical LN   Lungs: Bilateral BS  Cardiovascular: Regular   Abdomen: Soft, Positive BS  Extremities: No clubbing   Skin: warm   Neurological: no focal   Musculoskeletal: move all ext     I&O's Detail    03 Jan 2025 07:01  -  04 Jan 2025 07:00  --------------------------------------------------------  IN:    Dexmedetomidine: 149.1 mL    Enteral Tube Flush: 200 mL    FentaNYL: 118 mL    Heparin Infusion: 240 mL    IV PiggyBack: 100 mL    Jevity 1.2: 500 mL    Norepinephrine: 235.3 mL    Propofol: 91 mL  Total IN: 1633.4 mL    OUT:    Dexmedetomidine: 0 mL    Indwelling Catheter - Urethral (mL): 1115 mL  Total OUT: 1115 mL    Total NET: 518.4 mL      04 Jan 2025 07:01  -  05 Jan 2025 06:49  --------------------------------------------------------  IN:    FentaNYL: 7 mL    Heparin Infusion: 188 mL    IV PiggyBack: 200 mL    Norepinephrine: 38 mL  Total IN: 433 mL    OUT:    Indwelling Catheter - Urethral (mL): 1990 mL  Total OUT: 1990 mL    Total NET: -1557 mL          LABS:                          14.2   15.76 )-----------( 356      ( 04 Jan 2025 05:58 )             45.2         04 Jan 2025 05:58    144    |  102    |  71     ----------------------------<  283    3.8     |  28     |  2.2      Ca    8.8        04 Jan 2025 05:58  Phos  4.5       04 Jan 2025 05:58  Mg     2.3       04 Jan 2025 05:58                                               PT/INR - ( 03 Jan 2025 11:40 )   PT: 21.80 sec;   INR: 1.82 ratio         PTT - ( 04 Jan 2025 22:13 )  PTT:67.9 sec                                       Urinalysis Basic - ( 04 Jan 2025 05:58 )    Color: x / Appearance: x / SG: x / pH: x  Gluc: 283 mg/dL / Ketone: x  / Bili: x / Urobili: x   Blood: x / Protein: x / Nitrite: x   Leuk Esterase: x / RBC: x / WBC x   Sq Epi: x / Non Sq Epi: x / Bacteria: x                                                                                                             Mode: PS (Pressure Support)/ Spontaneous  FiO2: 40  PEEP: 8  PS: 5  MAP: 10  PIP: 14                                      ABG - ( 04 Jan 2025 09:33 )  pH, Arterial: 7.45  pH, Blood: x     /  pCO2: 51    /  pO2: 130   / HCO3: 35    / Base Excess: 9.6   /  SaO2: 98.5                MEDICATIONS  (STANDING):  albuterol/ipratropium for Nebulization 3 milliLiter(s) Nebulizer every 6 hours  aMIOdarone    Tablet 200 milliGRAM(s) Oral daily  buMETAnide Injectable 2 milliGRAM(s) IV Push daily  cefepime   IVPB 1000 milliGRAM(s) IV Intermittent every 12 hours  chlorhexidine 2% Cloths 1 Application(s) Topical <User Schedule>  clopidogrel Tablet 75 milliGRAM(s) Oral daily  dexMEDEtomidine Infusion 0.2 MICROgram(s)/kG/Hr (4.11 mL/Hr) IV Continuous <Continuous>  dextrose 5%. 1000 milliLiter(s) (50 mL/Hr) IV Continuous <Continuous>  dextrose 50% Injectable 25 Gram(s) IV Push once  dextrose 50% Injectable 12.5 Gram(s) IV Push once  dextrose 50% Injectable 25 Gram(s) IV Push once  ezetimibe 10 milliGRAM(s) Oral daily  fenofibrate Tablet 145 milliGRAM(s) Oral daily  glucagon  Injectable 1 milliGRAM(s) IntraMuscular once  heparin  Infusion. 1500 Unit(s)/Hr (15 mL/Hr) IV Continuous <Continuous>  insulin glargine Injectable (LANTUS) 10 Unit(s) SubCutaneous at bedtime  insulin lispro (ADMELOG) corrective regimen sliding scale   SubCutaneous three times a day before meals  metoprolol tartrate 75 milliGRAM(s) Oral every 6 hours  norepinephrine Infusion 0.05 MICROgram(s)/kG/Min (7.7 mL/Hr) IV Continuous <Continuous>  polyethylene glycol 3350 17 Gram(s) Oral two times a day  senna 2 Tablet(s) Oral at bedtime    MEDICATIONS  (PRN):  albuterol/ipratropium for Nebulization 3 milliLiter(s) Nebulizer every 6 hours PRN Shortness of Breath and/or Wheezing  aluminum hydroxide/magnesium hydroxide/simethicone Suspension 30 milliLiter(s) Oral every 4 hours PRN Dyspepsia  dextrose Oral Gel 15 Gram(s) Oral once PRN Blood Glucose LESS THAN 70 milliGRAM(s)/deciliter  hydrOXYzine hydrochloride 25 milliGRAM(s) Oral every 6 hours PRN Agitation  melatonin 3 milliGRAM(s) Oral at bedtime PRN Insomnia  ondansetron Injectable 4 milliGRAM(s) IV Push every 8 hours PRN Nausea and/or Vomiting  zolpidem 5 milliGRAM(s) Oral at bedtime PRN Insomnia          Xrays:   reviewed  by me b/l effusion / opacity                                                                                 ECHO:  [facility] ICU:

## 2025-01-06 LAB
ALBUMIN SERPL ELPH-MCNC: 3.3 G/DL — LOW (ref 3.5–5.2)
ALP SERPL-CCNC: 77 U/L — SIGNIFICANT CHANGE UP (ref 30–115)
ALT FLD-CCNC: 237 U/L — HIGH (ref 0–41)
ANION GAP SERPL CALC-SCNC: 13 MMOL/L — SIGNIFICANT CHANGE UP (ref 7–14)
APTT BLD: 51.3 SEC — HIGH (ref 27–39.2)
AST SERPL-CCNC: 73 U/L — HIGH (ref 0–41)
BASOPHILS # BLD AUTO: 0.04 K/UL — SIGNIFICANT CHANGE UP (ref 0–0.2)
BASOPHILS NFR BLD AUTO: 0.3 % — SIGNIFICANT CHANGE UP (ref 0–1)
BILIRUB SERPL-MCNC: 0.9 MG/DL — SIGNIFICANT CHANGE UP (ref 0.2–1.2)
BUN SERPL-MCNC: 58 MG/DL — HIGH (ref 10–20)
CALCIUM SERPL-MCNC: 9.4 MG/DL — SIGNIFICANT CHANGE UP (ref 8.4–10.5)
CHLORIDE SERPL-SCNC: 99 MMOL/L — SIGNIFICANT CHANGE UP (ref 98–110)
CO2 SERPL-SCNC: 32 MMOL/L — SIGNIFICANT CHANGE UP (ref 17–32)
CREAT SERPL-MCNC: 2.2 MG/DL — HIGH (ref 0.7–1.5)
CULTURE RESULTS: SIGNIFICANT CHANGE UP
EGFR: 32 ML/MIN/1.73M2 — LOW
EOSINOPHIL # BLD AUTO: 0.25 K/UL — SIGNIFICANT CHANGE UP (ref 0–0.7)
EOSINOPHIL NFR BLD AUTO: 1.8 % — SIGNIFICANT CHANGE UP (ref 0–8)
GLUCOSE BLDC GLUCOMTR-MCNC: 178 MG/DL — HIGH (ref 70–99)
GLUCOSE BLDC GLUCOMTR-MCNC: 221 MG/DL — HIGH (ref 70–99)
GLUCOSE BLDC GLUCOMTR-MCNC: 267 MG/DL — HIGH (ref 70–99)
GLUCOSE BLDC GLUCOMTR-MCNC: 283 MG/DL — HIGH (ref 70–99)
GLUCOSE SERPL-MCNC: 193 MG/DL — HIGH (ref 70–99)
HCT VFR BLD CALC: 43 % — SIGNIFICANT CHANGE UP (ref 42–52)
HGB BLD-MCNC: 13.4 G/DL — LOW (ref 14–18)
IMM GRANULOCYTES NFR BLD AUTO: 0.6 % — HIGH (ref 0.1–0.3)
LYMPHOCYTES # BLD AUTO: 1.59 K/UL — SIGNIFICANT CHANGE UP (ref 1.2–3.4)
LYMPHOCYTES # BLD AUTO: 11.4 % — LOW (ref 20.5–51.1)
MAGNESIUM SERPL-MCNC: 2.3 MG/DL — SIGNIFICANT CHANGE UP (ref 1.8–2.4)
MCHC RBC-ENTMCNC: 27.7 PG — SIGNIFICANT CHANGE UP (ref 27–31)
MCHC RBC-ENTMCNC: 31.2 G/DL — LOW (ref 32–37)
MCV RBC AUTO: 89 FL — SIGNIFICANT CHANGE UP (ref 80–94)
MONOCYTES # BLD AUTO: 1.2 K/UL — HIGH (ref 0.1–0.6)
MONOCYTES NFR BLD AUTO: 8.6 % — SIGNIFICANT CHANGE UP (ref 1.7–9.3)
NEUTROPHILS # BLD AUTO: 10.83 K/UL — HIGH (ref 1.4–6.5)
NEUTROPHILS NFR BLD AUTO: 77.3 % — HIGH (ref 42.2–75.2)
NRBC # BLD: 0 /100 WBCS — SIGNIFICANT CHANGE UP (ref 0–0)
PHOSPHATE SERPL-MCNC: 2.8 MG/DL — SIGNIFICANT CHANGE UP (ref 2.1–4.9)
PLATELET # BLD AUTO: 296 K/UL — SIGNIFICANT CHANGE UP (ref 130–400)
PMV BLD: 12.1 FL — HIGH (ref 7.4–10.4)
POTASSIUM SERPL-MCNC: 3.6 MMOL/L — SIGNIFICANT CHANGE UP (ref 3.5–5)
POTASSIUM SERPL-SCNC: 3.6 MMOL/L — SIGNIFICANT CHANGE UP (ref 3.5–5)
PROT SERPL-MCNC: 5.8 G/DL — LOW (ref 6–8)
RBC # BLD: 4.83 M/UL — SIGNIFICANT CHANGE UP (ref 4.7–6.1)
RBC # FLD: 13.2 % — SIGNIFICANT CHANGE UP (ref 11.5–14.5)
SODIUM SERPL-SCNC: 144 MMOL/L — SIGNIFICANT CHANGE UP (ref 135–146)
SPECIMEN SOURCE: SIGNIFICANT CHANGE UP
WBC # BLD: 14 K/UL — HIGH (ref 4.8–10.8)
WBC # FLD AUTO: 14 K/UL — HIGH (ref 4.8–10.8)

## 2025-01-06 PROCEDURE — 99233 SBSQ HOSP IP/OBS HIGH 50: CPT

## 2025-01-06 PROCEDURE — G0408: CPT | Mod: 95

## 2025-01-06 PROCEDURE — 99232 SBSQ HOSP IP/OBS MODERATE 35: CPT

## 2025-01-06 PROCEDURE — 99223 1ST HOSP IP/OBS HIGH 75: CPT

## 2025-01-06 RX ORDER — HEPARIN SODIUM 1000 [USP'U]/ML
900 INJECTION, SOLUTION INTRAVENOUS; SUBCUTANEOUS
Qty: 25000 | Refills: 0 | Status: DISCONTINUED | OUTPATIENT
Start: 2025-01-06 | End: 2025-01-10

## 2025-01-06 RX ORDER — HEPARIN SODIUM 1000 [USP'U]/ML
3000 INJECTION, SOLUTION INTRAVENOUS; SUBCUTANEOUS EVERY 6 HOURS
Refills: 0 | Status: DISCONTINUED | OUTPATIENT
Start: 2025-01-06 | End: 2025-01-10

## 2025-01-06 RX ORDER — HEPARIN SODIUM 1000 [USP'U]/ML
6500 INJECTION, SOLUTION INTRAVENOUS; SUBCUTANEOUS EVERY 6 HOURS
Refills: 0 | Status: DISCONTINUED | OUTPATIENT
Start: 2025-01-06 | End: 2025-01-10

## 2025-01-06 RX ORDER — ATORVASTATIN CALCIUM 40 MG/1
40 TABLET, FILM COATED ORAL AT BEDTIME
Refills: 0 | Status: DISCONTINUED | OUTPATIENT
Start: 2025-01-06 | End: 2025-01-16

## 2025-01-06 RX ADMIN — Medication 75 MILLIGRAM(S): at 12:05

## 2025-01-06 RX ADMIN — Medication 75 MILLIGRAM(S): at 18:38

## 2025-01-06 RX ADMIN — HEPARIN SODIUM 900 UNIT(S)/HR: 1000 INJECTION, SOLUTION INTRAVENOUS; SUBCUTANEOUS at 00:46

## 2025-01-06 RX ADMIN — Medication 100 MILLIGRAM(S): at 06:15

## 2025-01-06 RX ADMIN — Medication 75 MILLIGRAM(S): at 00:47

## 2025-01-06 RX ADMIN — CLOPIDOGREL BISULFATE 75 MILLIGRAM(S): 75 TABLET, FILM COATED ORAL at 12:05

## 2025-01-06 RX ADMIN — Medication 1: at 08:04

## 2025-01-06 RX ADMIN — AMIODARONE HYDROCHLORIDE 200 MILLIGRAM(S): 200 TABLET ORAL at 06:15

## 2025-01-06 RX ADMIN — Medication 3 MILLIGRAM(S): at 22:06

## 2025-01-06 RX ADMIN — FENOFIBRATE 145 MILLIGRAM(S): 48 TABLET ORAL at 12:03

## 2025-01-06 RX ADMIN — HEPARIN SODIUM 900 UNIT(S)/HR: 1000 INJECTION, SOLUTION INTRAVENOUS; SUBCUTANEOUS at 18:41

## 2025-01-06 RX ADMIN — EZETIMIBE 10 MILLIGRAM(S): 10 TABLET ORAL at 12:03

## 2025-01-06 RX ADMIN — Medication 3: at 12:00

## 2025-01-06 RX ADMIN — ATORVASTATIN CALCIUM 40 MILLIGRAM(S): 40 TABLET, FILM COATED ORAL at 22:03

## 2025-01-06 RX ADMIN — Medication 25 MILLIGRAM(S): at 22:06

## 2025-01-06 RX ADMIN — CHLORHEXIDINE GLUCONATE 1 APPLICATION(S): 1.2 RINSE ORAL at 08:03

## 2025-01-06 RX ADMIN — HEPARIN SODIUM 1100 UNIT(S)/HR: 1000 INJECTION, SOLUTION INTRAVENOUS; SUBCUTANEOUS at 22:07

## 2025-01-06 RX ADMIN — Medication 17 GRAM(S): at 18:38

## 2025-01-06 RX ADMIN — INSULIN GLARGINE-YFGN 10 UNIT(S): 100 INJECTION, SOLUTION SUBCUTANEOUS at 23:38

## 2025-01-06 RX ADMIN — Medication 100 MILLIGRAM(S): at 18:40

## 2025-01-06 RX ADMIN — HEPARIN SODIUM 900 UNIT(S)/HR: 1000 INJECTION, SOLUTION INTRAVENOUS; SUBCUTANEOUS at 08:03

## 2025-01-06 RX ADMIN — Medication 5 MILLIGRAM(S): at 22:03

## 2025-01-06 RX ADMIN — IPRATROPIUM BROMIDE AND ALBUTEROL SULFATE 3 MILLILITER(S): .5; 2.5 SOLUTION RESPIRATORY (INHALATION) at 07:37

## 2025-01-06 RX ADMIN — SENNOSIDES 2 TABLET(S): 8.6 TABLET, FILM COATED ORAL at 22:02

## 2025-01-06 RX ADMIN — Medication 75 MILLIGRAM(S): at 06:15

## 2025-01-06 RX ADMIN — BUMETANIDE 1 MILLIGRAM(S): 2 TABLET ORAL at 06:15

## 2025-01-06 NOTE — PROGRESS NOTE ADULT - ASSESSMENT
EP: Deandre    69-year-old male with past medical history of chronic HFrEF s/p ICD, AVR porcine, HTN, PAF s/p ablation, CAD s/p PCI, DM, HLD, COPD (no home O2), smoker presenting with worsening shortness of breath and weakness with concern for acute on chronic CHF exacerbation in the setting of persistent afib. 12/31 pt went into flash pulm edema requiring intubation. Now extubated and cleared for ablation.    Impression:  #Persistent AF  #ICM s/p CRT-D  #Bioprosthetic AV  #Acute CHF exacerbation    Recommendations:  - Will schedule ablation as outpatient, office to contact the patient with an OR date  - Continue metoprolol 75 Q 6, change to XL on discharge  - Continue amiodarone 200 mg q24h- discontinue if LFTs increase - currently downtrending  - Continue heparin gtt, change to DOAC prior to discharge  - Can consider Dig for further rate control if kidney function is stable   - Care per primary team  - Recall as needed, [id number]      Plan discussed with patient and medical team

## 2025-01-06 NOTE — PROGRESS NOTE ADULT - SUBJECTIVE AND OBJECTIVE BOX
Patient is a 69y old  Male who presents with a chief complaint of CHF exacerbation (06 Jan 2025 08:50)      T(F): 97.8 (01-06-25 @ 07:01), Max: 98.4 (01-05-25 @ 15:37)  HR: 102 (01-06-25 @ 06:00)  BP: 149/80 (01-06-25 @ 06:00)  RR: 20 (01-06-25 @ 07:01)  SpO2: 97% (01-06-25 @ 06:00) (95% - 97%)    PHYSICAL EXAM:  GENERAL: NAD, well-groomed, well-developed  HEAD:  Atraumatic, Normocephalic  EYES: EOMI, PERRLA, conjunctiva and sclera clear  ENMT: No tonsillar erythema, exudates, or enlargement; Moist mucous membranes, Good dentition, No lesions  NECK: Supple, No JVD, Normal thyroid  NERVOUS SYSTEM:  Alert & Oriented X3,  Motor Strength 5/5 B/L upper and lower extremities  CHEST/LUNG: Clear to percussion bilaterally; No rales, rhonchi, wheezing, or rubs  HEART: Regular rate and rhythm; No murmurs, rubs, or gallops  ABDOMEN: Soft, Nontender, Nondistended; Bowel sounds present  EXTREMITIES:   No clubbing, cyanosis, or edema  LYMPH: No lymphadenopathy noted  SKIN: No rashes or lesions    labs  01-06    144  |  99  |  58[H]  ----------------------------<  193[H]  3.6   |  32  |  2.2[H]    Ca    9.4      06 Jan 2025 06:27  Phos  2.8     01-06  Mg     2.3     01-06    TPro  5.8[L]  /  Alb  3.3[L]  /  TBili  0.9  /  DBili  x   /  AST  73[H]  /  ALT  237[H]  /  AlkPhos  77  01-06                          13.4   14.00 )-----------( 296      ( 06 Jan 2025 06:27 )             43.0       PTT - ( 06 Jan 2025 06:27 )  PTT:51.3 sec        albuterol/ipratropium for Nebulization 3 milliLiter(s) Nebulizer every 6 hours PRN  aluminum hydroxide/magnesium hydroxide/simethicone Suspension 30 milliLiter(s) Oral every 4 hours PRN  aMIOdarone    Tablet 200 milliGRAM(s) Oral daily  atorvastatin 40 milliGRAM(s) Oral at bedtime  buMETAnide Injectable 1 milliGRAM(s) IV Push daily  cefepime   IVPB 1000 milliGRAM(s) IV Intermittent every 12 hours  chlorhexidine 2% Cloths 1 Application(s) Topical <User Schedule>  clopidogrel Tablet 75 milliGRAM(s) Oral daily  dextrose 5%. 1000 milliLiter(s) IV Continuous <Continuous>  dextrose 50% Injectable 25 Gram(s) IV Push once  dextrose 50% Injectable 12.5 Gram(s) IV Push once  dextrose 50% Injectable 25 Gram(s) IV Push once  dextrose Oral Gel 15 Gram(s) Oral once PRN  ezetimibe 10 milliGRAM(s) Oral daily  fenofibrate Tablet 145 milliGRAM(s) Oral daily  glucagon  Injectable 1 milliGRAM(s) IntraMuscular once  heparin   Injectable 6500 Unit(s) IV Push every 6 hours PRN  heparin   Injectable 3000 Unit(s) IV Push every 6 hours PRN  heparin  Infusion. 900 Unit(s)/Hr IV Continuous <Continuous>  hydrOXYzine hydrochloride 25 milliGRAM(s) Oral every 6 hours PRN  insulin glargine Injectable (LANTUS) 10 Unit(s) SubCutaneous at bedtime  insulin lispro (ADMELOG) corrective regimen sliding scale   SubCutaneous three times a day before meals  melatonin 3 milliGRAM(s) Oral at bedtime PRN  metoprolol tartrate 75 milliGRAM(s) Oral every 6 hours  ondansetron Injectable 4 milliGRAM(s) IV Push every 8 hours PRN  polyethylene glycol 3350 17 Gram(s) Oral two times a day  senna 2 Tablet(s) Oral at bedtime  zolpidem 5 milliGRAM(s) Oral at bedtime PRN

## 2025-01-06 NOTE — PROGRESS NOTE ADULT - SUBJECTIVE AND OBJECTIVE BOX
Patient is a 69y old  Male who presents with a chief complaint of CHF exacerbation (05 Jan 2025 11:37)      Over Night Events:  Patient seen and examined.   no pressors   on heparin drip    on RA     ROS:  See HPI    PHYSICAL EXAM    ICU Vital Signs Last 24 Hrs  T(C): 36.6 (06 Jan 2025 07:01), Max: 36.9 (05 Jan 2025 15:37)  T(F): 97.8 (06 Jan 2025 07:01), Max: 98.4 (05 Jan 2025 15:37)  HR: 102 (06 Jan 2025 06:00) (99 - 115)  BP: 149/80 (06 Jan 2025 06:00) (121/75 - 149/83)  BP(mean): 104 (06 Jan 2025 06:00) (99 - 106)  ABP: --  ABP(mean): --  RR: 20 (06 Jan 2025 07:01) (20 - 34)  SpO2: 97% (06 Jan 2025 06:00) (95% - 97%)    O2 Parameters below as of 06 Jan 2025 06:00  Patient On (Oxygen Delivery Method): nasal cannula            General: awake   HEENT:              teodoro  Lymph Nodes: NO cervical LN   Lungs: Bilateral BS  Cardiovascular: Regular   Abdomen: Soft, Positive BS  Extremities: No clubbing   Skin: warm   Neurological: no focal   Musculoskeletal: move all ext     I&O's Detail    05 Jan 2025 07:01  -  06 Jan 2025 07:00  --------------------------------------------------------  IN:    Heparin Infusion: 27 mL    Heparin Infusion: 63 mL    Oral Fluid: 240 mL  Total IN: 330 mL    OUT:    Indwelling Catheter - Urethral (mL): 1650 mL    Rectal Tube (mL): 400 mL  Total OUT: 2050 mL    Total NET: -1720 mL          LABS:                          13.4   14.00 )-----------( 296      ( 06 Jan 2025 06:27 )             43.0         06 Jan 2025 06:27    144    |  99     |  58     ----------------------------<  193    3.6     |  32     |  2.2      Ca    9.4        06 Jan 2025 06:27  Phos  2.8       06 Jan 2025 06:27  Mg     2.3       06 Jan 2025 06:27    TPro  5.8    /  Alb  3.3    /  TBili  0.9    /  DBili  x      /  AST  73     /  ALT  237    /  AlkPhos  77     06 Jan 2025 06:27  Amylase x     lipase x                                                 PTT - ( 06 Jan 2025 06:27 )  PTT:51.3 sec                                       Urinalysis Basic - ( 06 Jan 2025 06:27 )    Color: x / Appearance: x / SG: x / pH: x  Gluc: 193 mg/dL / Ketone: x  / Bili: x / Urobili: x   Blood: x / Protein: x / Nitrite: x   Leuk Esterase: x / RBC: x / WBC x   Sq Epi: x / Non Sq Epi: x / Bacteria: x                                                                                                                                                 ABG - ( 04 Jan 2025 09:33 )  pH, Arterial: 7.45  pH, Blood: x     /  pCO2: 51    /  pO2: 130   / HCO3: 35    / Base Excess: 9.6   /  SaO2: 98.5                MEDICATIONS  (STANDING):  aMIOdarone    Tablet 200 milliGRAM(s) Oral daily  buMETAnide Injectable 1 milliGRAM(s) IV Push daily  cefepime   IVPB 1000 milliGRAM(s) IV Intermittent every 12 hours  chlorhexidine 2% Cloths 1 Application(s) Topical <User Schedule>  clopidogrel Tablet 75 milliGRAM(s) Oral daily  dextrose 5%. 1000 milliLiter(s) (50 mL/Hr) IV Continuous <Continuous>  dextrose 50% Injectable 25 Gram(s) IV Push once  dextrose 50% Injectable 12.5 Gram(s) IV Push once  dextrose 50% Injectable 25 Gram(s) IV Push once  ezetimibe 10 milliGRAM(s) Oral daily  fenofibrate Tablet 145 milliGRAM(s) Oral daily  glucagon  Injectable 1 milliGRAM(s) IntraMuscular once  heparin  Infusion. 900 Unit(s)/Hr (9 mL/Hr) IV Continuous <Continuous>  insulin glargine Injectable (LANTUS) 10 Unit(s) SubCutaneous at bedtime  insulin lispro (ADMELOG) corrective regimen sliding scale   SubCutaneous three times a day before meals  metoprolol tartrate 75 milliGRAM(s) Oral every 6 hours  polyethylene glycol 3350 17 Gram(s) Oral two times a day  senna 2 Tablet(s) Oral at bedtime    MEDICATIONS  (PRN):  albuterol/ipratropium for Nebulization 3 milliLiter(s) Nebulizer every 6 hours PRN Shortness of Breath and/or Wheezing  aluminum hydroxide/magnesium hydroxide/simethicone Suspension 30 milliLiter(s) Oral every 4 hours PRN Dyspepsia  dextrose Oral Gel 15 Gram(s) Oral once PRN Blood Glucose LESS THAN 70 milliGRAM(s)/deciliter  heparin   Injectable 6500 Unit(s) IV Push every 6 hours PRN For aPTT less than 40  heparin   Injectable 3000 Unit(s) IV Push every 6 hours PRN For aPTT between 40 - 57  hydrOXYzine hydrochloride 25 milliGRAM(s) Oral every 6 hours PRN Agitation  melatonin 3 milliGRAM(s) Oral at bedtime PRN Insomnia  ondansetron Injectable 4 milliGRAM(s) IV Push every 8 hours PRN Nausea and/or Vomiting  zolpidem 5 milliGRAM(s) Oral at bedtime PRN Insomnia          Xrays:  TLC:  OG:  ET tube:                                                                                       ECHO:  [facility] ICU:

## 2025-01-06 NOTE — PHARMACOTHERAPY INTERVENTION NOTE - COMMENTS
Recommend to hold listed medications in setting of transaminitis to avoid progression of liver injury 
Patient's LFTs are improving- Recommend to restart home medication atorvastatin 40mg 
Patient last ECG (on 12/31) reads a QTc of 535, recommend to obtain repeat ECG, as patient is on amiodarone and hydroxyzine.   Per provider, patient is on a pacemaker but will obtain an ECG

## 2025-01-06 NOTE — PROGRESS NOTE ADULT - SUBJECTIVE AND OBJECTIVE BOX
----------Daily Progress Note----------    HISTORY OF PRESENT ILLNESS:  Patient is a 69y old Male who presents with a chief complaint of CHF exacerbation (06 Jan 2025 13:10)    Currently admitted to medicine with the primary diagnosis of CHF exacerbation       Today is hospital day 10d.     INTERVAL HOSPITAL COURSE / OVERNIGHT EVENTS:    Patient was examined and seen at bedside. This morning he is resting comfortably in bed and reports no new issues or overnight events.     Review of Systems: Otherwise unremarkable     <<<<<PAST MEDICAL & SURGICAL HISTORY>>>>>  CHF (congestive heart failure)    Diabetes    CAD (coronary artery disease)    Chronic obstructive pulmonary disease (COPD)    HTN (hypertension)    Stented coronary artery    Dyslipidemia    GERD (gastroesophageal reflux disease)    Afib    CVA (cerebral vascular accident)    H/O heart artery stent    Heart valve replaced  aortic    History of Nissen fundoplication      ALLERGIES  sulfa drugs (Unknown)      Home Medications:  amiodarone 200 mg oral tablet: 1 tab(s) orally once a day (27 Dec 2024 11:06)  apixaban 5 mg oral tablet: 1 tab(s) orally every 12 hours (26 Dec 2024 15:14)  atorvastatin 40 mg oral tablet: 1 tab(s) orally once a day (27 Dec 2024 11:11)  clopidogrel 75 mg oral tablet: 1 tab(s) orally once a day (26 Dec 2024 15:14)  Entresto 24 mg-26 mg oral tablet: 1 tab(s) orally 2 times a day (26 Dec 2024 15:14)  eszopiclone 3 mg oral tablet: 1 tab(s) orally once a day (at bedtime) (26 Dec 2024 15:14)  ezetimibe 10 mg oral tablet: 1 orally once a day (26 Dec 2024 15:14)  fenofibrate 145 mg oral tablet: 1 orally once a day (26 Dec 2024 15:14)  furosemide 40 mg oral tablet: 1 orally once a day (26 Dec 2024 15:14)  metFORMIN 500 mg oral tablet: 1 tab(s) orally 2 times a day (26 Dec 2024 15:14)  metoprolol succinate 200 mg oral capsule, extended release: 1 cap(s) orally once a day (27 Dec 2024 11:07)  Trulicity Pen 1.5 mg/0.5 mL subcutaneous solution: 1.5 milligram(s) subcutaneously once a week (26 Dec 2024 15:14)        MEDICATIONS  STANDING MEDICATIONS  aMIOdarone    Tablet 200 milliGRAM(s) Oral daily  atorvastatin 40 milliGRAM(s) Oral at bedtime  buMETAnide Injectable 1 milliGRAM(s) IV Push daily  cefepime   IVPB 1000 milliGRAM(s) IV Intermittent every 12 hours  chlorhexidine 2% Cloths 1 Application(s) Topical <User Schedule>  clopidogrel Tablet 75 milliGRAM(s) Oral daily  dextrose 5%. 1000 milliLiter(s) IV Continuous <Continuous>  dextrose 50% Injectable 25 Gram(s) IV Push once  dextrose 50% Injectable 12.5 Gram(s) IV Push once  dextrose 50% Injectable 25 Gram(s) IV Push once  ezetimibe 10 milliGRAM(s) Oral daily  fenofibrate Tablet 145 milliGRAM(s) Oral daily  glucagon  Injectable 1 milliGRAM(s) IntraMuscular once  heparin  Infusion. 900 Unit(s)/Hr IV Continuous <Continuous>  insulin glargine Injectable (LANTUS) 10 Unit(s) SubCutaneous at bedtime  insulin lispro (ADMELOG) corrective regimen sliding scale   SubCutaneous three times a day before meals  metoprolol tartrate 75 milliGRAM(s) Oral every 6 hours  polyethylene glycol 3350 17 Gram(s) Oral two times a day  senna 2 Tablet(s) Oral at bedtime    PRN MEDICATIONS  albuterol/ipratropium for Nebulization 3 milliLiter(s) Nebulizer every 6 hours PRN  aluminum hydroxide/magnesium hydroxide/simethicone Suspension 30 milliLiter(s) Oral every 4 hours PRN  dextrose Oral Gel 15 Gram(s) Oral once PRN  heparin   Injectable 3000 Unit(s) IV Push every 6 hours PRN  heparin   Injectable 6500 Unit(s) IV Push every 6 hours PRN  hydrOXYzine hydrochloride 25 milliGRAM(s) Oral every 6 hours PRN  melatonin 3 milliGRAM(s) Oral at bedtime PRN  ondansetron Injectable 4 milliGRAM(s) IV Push every 8 hours PRN  zolpidem 5 milliGRAM(s) Oral at bedtime PRN    VITALS:  T(F): 97.8  HR: 106  BP: 151/82  RR: 20  SpO2: 97%    <<<<<LABS>>>>>                        13.4   14.00 )-----------( 296      ( 06 Jan 2025 06:27 )             43.0     01-06    144  |  99  |  58[H]  ----------------------------<  193[H]  3.6   |  32  |  2.2[H]    Ca    9.4      06 Jan 2025 06:27  Phos  2.8     01-06  Mg     2.3     01-06    TPro  5.8[L]  /  Alb  3.3[L]  /  TBili  0.9  /  DBili  x   /  AST  73[H]  /  ALT  237[H]  /  AlkPhos  77  01-06    PTT - ( 06 Jan 2025 06:27 )  PTT:51.3 sec  Urinalysis Basic - ( 06 Jan 2025 06:27 )    Color: x / Appearance: x / SG: x / pH: x  Gluc: 193 mg/dL / Ketone: x  / Bili: x / Urobili: x   Blood: x / Protein: x / Nitrite: x   Leuk Esterase: x / RBC: x / WBC x   Sq Epi: x / Non Sq Epi: x / Bacteria: x          [id number]        <<<<<RADIOLOGY>>>>>    <<<<<PHYSICAL EXAM>>>>>  GENERAL: Well developed, well nourished and in no acute distress. Resting comfortably in bed.  HEENT: Normocephalic, atraumatic, mucous membranes moist, EOMI, PERRLA  Neck: Supple, non-tender, no lymphadenopathy.  PULMONARY: Clear to auscultation bilaterally. No rales, rhonchi, or wheezing.  CARDIOVASCULAR: Regular rate and rhythm, S1-S2, no murmurs  GASTROINTESTINAL: Soft, non-tender, non-distended, no guarding.  RENAL: No CVA tenderness.  SKIN/EXTREMITIES: No clubbing or edema  NEUROLOGIC/MUSCULOSKELETAL: AOx4, grossly moving all extremities, no focal deficits.      -----------------------------------------------------------------------------------------------------------------------------------------------------------------------------------------------

## 2025-01-06 NOTE — PROGRESS NOTE ADULT - ATTENDING COMMENTS
69-year-old male with past medical history of chronic HFrEF s/p ICD, AVR porcine, HTN, PAF s/p ablation, CAD s/p PCI, DM, HLD, COPD (no home O2), smoker presenting with worsening shortness of breath and weakness with  acute on chronic CHF exacerbation in the setting of persistent afib      acute respiratory failure / Acute on chronic CHF exacerbation in HFmrEF / fevers     - continue IV Bumex   - amio, metoprolol, Plavix   - procal elevated - check repeat   - complete course of cefepime   - outpt ablation with EP   - 50 minutes total spent today on patient care
IMPRESSION:    Acute Hypoxemic Respiratory Failure   Pulmonary Edema  Acute Decompensated HFmrEF   Toxic Metabolic Encephalopathy   Persistent AFIB awaiting Albation   KAREN - improving.   HO ICM s/p CRT-D  HO Bioprosthetic AV  HO CVA  HO DM   HO COPD
IMPRESSION:    Acute Hypoxemic Respiratory Failure   Pulmonary Edema  Acute Decompensated HFmrEF   Toxic Metabolic Encephalopathy   Persistent AFIB awaiting Albation   KAREN - improving.   Transaminitis  HO ICM s/p CRT-D  HO Bioprosthetic AV  HO CVA  HO DM   HO COPD

## 2025-01-06 NOTE — CONSULT NOTE ADULT - ASSESSMENT
69-year-old male with past medical history of chronic HFrEF s/p ICD, AVR porcine, HTN, PAF s/p ablation, CAD s/p PCI, DM, HLD, COPD (no home O2), smoker presenting with worsening shortness of breath and weakness with concern for acute on chronic CHF exacerbation in the setting of persistent afib with Acute Hypoxemic Respiratory Failure due to pulmonary edema due to acute on chronic HFmrEF. GI consulted for altered LFTs.      #altered LFTs likely due to CHF  LFTS improving  Rec  -CBD not dilated, no abdominal pain  -repeat ultrasound as liver was not well visualized  -monitor LFTs  -acute hepatitis panel WNL  -avoid hepatotoxic medications  -optimize cardiac status    #CRC screening   Rec  -Follow up with our [facility] located at [address] [place] [address]. Phone Number: [phone] to schedule CRC screening Colonoscopy  69-year-old male with past medical history of chronic HFrEF s/p ICD, AVR porcine, HTN, PAF s/p ablation, CAD s/p PCI, DM, HLD, COPD (no home O2), smoker presenting with worsening shortness of breath and weakness with concern for acute on chronic CHF exacerbation in the setting of persistent afib with Acute Hypoxemic Respiratory Failure due to pulmonary edema due to acute on chronic HFmrEF. GI consulted for altered LFTs.      #altered LFTs likely due to CHF  congestive hepatopathy   LFTS improving  Rec  -CBD not dilated, no abdominal pain  -repeat ultrasound as liver was not well visualized  -monitor LFTs  -acute hepatitis panel WNL  -avoid hepatotoxic medications  -optimize cardiac status    #CRC screening   Rec  -Follow up with our [facility] located at [address] [place] [address]. Phone Number: [phone] to schedule CRC screening Colonoscopy

## 2025-01-06 NOTE — PROGRESS NOTE ADULT - SUBJECTIVE AND OBJECTIVE BOX
INTERVAL HPI/OVERNIGHT EVENTS:  No acute events. Called by team that patient is cleared by pulm and cardio for ablation.   Patient denies fever, chills, dizziness, syncope, chest pain, palpitations, SOB, cough.    MEDICATIONS  (STANDING):  aMIOdarone    Tablet 200 milliGRAM(s) Oral daily  atorvastatin 40 milliGRAM(s) Oral at bedtime  buMETAnide Injectable 1 milliGRAM(s) IV Push daily  cefepime   IVPB 1000 milliGRAM(s) IV Intermittent every 12 hours  chlorhexidine 2% Cloths 1 Application(s) Topical <User Schedule>  clopidogrel Tablet 75 milliGRAM(s) Oral daily  ezetimibe 10 milliGRAM(s) Oral daily  fenofibrate Tablet 145 milliGRAM(s) Oral daily  glucagon  Injectable 1 milliGRAM(s) IntraMuscular once  heparin  Infusion. 900 Unit(s)/Hr (9 mL/Hr) IV Continuous <Continuous>  insulin glargine Injectable (LANTUS) 10 Unit(s) SubCutaneous at bedtime  insulin lispro (ADMELOG) corrective regimen sliding scale   SubCutaneous three times a day before meals  metoprolol tartrate 75 milliGRAM(s) Oral every 6 hours  polyethylene glycol 3350 17 Gram(s) Oral two times a day  senna 2 Tablet(s) Oral at bedtime    MEDICATIONS  (PRN):  albuterol/ipratropium for Nebulization 3 milliLiter(s) Nebulizer every 6 hours PRN Shortness of Breath and/or Wheezing  aluminum hydroxide/magnesium hydroxide/simethicone Suspension 30 milliLiter(s) Oral every 4 hours PRN Dyspepsia  dextrose Oral Gel 15 Gram(s) Oral once PRN Blood Glucose LESS THAN 70 milliGRAM(s)/deciliter  heparin   Injectable 3000 Unit(s) IV Push every 6 hours PRN For aPTT between 40 - 57  heparin   Injectable 6500 Unit(s) IV Push every 6 hours PRN For aPTT less than 40  hydrOXYzine hydrochloride 25 milliGRAM(s) Oral every 6 hours PRN Agitation  melatonin 3 milliGRAM(s) Oral at bedtime PRN Insomnia  ondansetron Injectable 4 milliGRAM(s) IV Push every 8 hours PRN Nausea and/or Vomiting  zolpidem 5 milliGRAM(s) Oral at bedtime PRN Insomnia    Allergies  sulfa drugs (Unknown)    REVIEW OF SYSTEMS    [x] A ten-point review of systems was otherwise negative except as noted.  [ ] Due to altered mental status/intubation, subjective information were not able to be obtained from the patient. History was obtained, to the extent possible, from review of the chart and collateral sources of information.    Vital Signs Last 24 Hrs  T(C): 36.6 (06 Jan 2025 07:01), Max: 36.9 (05 Jan 2025 15:37)  T(F): 97.8 (06 Jan 2025 07:01), Max: 98.4 (05 Jan 2025 15:37)  HR: 106 (06 Jan 2025 12:39) (99 - 115)  BP: 151/82 (06 Jan 2025 12:39) (121/75 - 151/82)  BP(mean): 104 (06 Jan 2025 06:00) (99 - 106)  RR: 20 (06 Jan 2025 12:39) (20 - 34)  SpO2: 97% (06 Jan 2025 06:00) (95% - 97%)    Parameters below as of 06 Jan 2025 06:00  Patient On (Oxygen Delivery Method): nasal cannula    01-05-25 @ 07:01  -  01-06-25 @ 07:00  --------------------------------------------------------  IN: 330 mL / OUT: 2050 mL / NET: -1720 mL    01-06-25 @ 07:01  -  01-06-25 @ 13:10  --------------------------------------------------------  IN: 240 mL / OUT: 0 mL / NET: 240 mL    Physical Exam  n/a - fu done over phone at remote location    LABS:                        13.4   14.00 )-----------( 296      ( 06 Jan 2025 06:27 )             43.0     01-06    144  |  99  |  58[H]  ----------------------------<  193[H]  3.6   |  32  |  2.2[H]    Ca    9.4      06 Jan 2025 06:27  Phos  2.8     01-06  Mg     2.3     01-06    TPro  5.8[L]  /  Alb  3.3[L]  /  TBili  0.9  /  DBili  x   /  AST  73[H]  /  ALT  237[H]  /  AlkPhos  77  01-06    PTT - ( 06 Jan 2025 06:27 )  PTT:51.3 sec  Urinalysis Basic - ( 06 Jan 2025 06:27 )    Color: x / Appearance: x / SG: x / pH: x  Gluc: 193 mg/dL / Ketone: x  / Bili: x / Urobili: x   Blood: x / Protein: x / Nitrite: x   Leuk Esterase: x / RBC: x / WBC x   Sq Epi: x / Non Sq Epi: x / Bacteria: x    01-05-25 @ 7:01  -  01-06-25 @ 07:00  --------------------------------------------------------  IN: 330 mL / OUT: 2050 mL / NET: -1720 mL    01-06-25 @ 07:01 - 01-06-25 @ 13:10  --------------------------------------------------------  IN: 240 mL / OUT: 0 mL / NET: 240 mL    01-05-25 @ 07:01  -  01-06-25 @ 07:00  --------------------------------------------------------  IN: 330 mL / OUT: 2050 mL / NET: -1720 mL    01-06-25 @ 07:01  -  01-06-25 @ 13:10  --------------------------------------------------------  IN: 240 mL / OUT: 0 mL / NET: 240 mL    RADIOLOGY:

## 2025-01-06 NOTE — PROGRESS NOTE ADULT - ASSESSMENT
IMPRESSION:    Acute Hypoxemic Respiratory Failure   Pulmonary Edema  Acute Decompensated HFmrEF   Toxic Metabolic Encephalopathy   Persistent AFIB awaiting Albation   KAREN -   Transaminitis improving   HO ICM s/p CRT-D  HO Bioprosthetic AV  HO CVA  HO DM   HO COPD     PLAN:    CNS:  no sedation     HEENT: Oral care      PULMONARY:  HOB @ 45 degrees.  keep pox >92%   refusing cxr   CARDIOVASCULAR: Volume contraction as tolerated, optimize cardiac therapy,   lower bumex to 1 mg daily for now   keep is < os    Rate control, Cardiology follow up, TTE reviewed, proBNP noted. Wean levophed as tolerated, target SpO2 92-96%.   Check EKG.     GI: GI prophylaxis. If no extubation, OG feeds. Bowel regimen. Trend LFTs  improving   . RUQ US. DC statin. GI evaluation.    hold stool softner   RENAL:  Follow up lytes.  Correct as needed.  Consult nephrology. Monitor UO.   follow BUn, cr increased   lower bumex  BMP after noon   voiding trial   INFECTIOUS DISEASE: Follow up cultures, ABX per ID: cefepime. RVP negative. UA noted.   today last dose cefepime   HEMATOLOGICAL: Full AC. Monitor CBC and coags. target PTT around 70     ENDOCRINE:  Follow up FS.  Insulin protocol if needed.    MUSCULOSKELETAL: bedrest for now    Goals of care   downgrade to tele if ok by cardiology   recall prn

## 2025-01-06 NOTE — PROGRESS NOTE ADULT - ASSESSMENT
69-year-old male with past medical history of chronic HFrEF s/p ICD, AVR porcine, HTN, PAF s/p ablation, CAD s/p PCI, DM, HLD, COPD (no home O2), smoker presenting with worsening shortness of breath and weakness with concern for acute on chronic CHF exacerbation in the setting of persistent afib    Acute Hypoxemic Respiratory Failure due to pulmonary edema due to acute on chronic HFmrEF   - self extubated   - then placed on bipap, which pt also removed   - stable on NC  - cefepime  - bumex iv    Persistent AFIB   - Ablation as ouptatient, as per EP    KAREN   - stable    Transaminitis   - probably HF related, liver congestion  - resolving    ICM s/p CRT-D  - stable    Bioprosthetic AV  - stable    CVA hx  - stable    COPD   - stable    stable for downgrade to tele

## 2025-01-06 NOTE — CONSULT NOTE ADULT - SUBJECTIVE AND OBJECTIVE BOX
Chief complaint/Reason for consult: altered LFTs    HPI:  This is a 69-year-old male with past medical history of chronic HFrEF s/p ICD, AVR porcine, HTN, PAF s/p ablation, CAD s/p PCI, DM, HLD, COPD (no home O2), smoker presenting with worsening shortness of breath and weakness.  Patient was admitted within the last 24 hours for acute on chronic CHF exacerbation, was given IV Lasix and BiPAP, and left AMA.  Patient returns stating that he became more short of breath last night after leaving the hospital and would like to be readmitted for treatment.  Denies fever/chills, chest pain, abdominal pain, calf pain, N/V/D/C, cough, and nasal congestion.      (27 Dec 2024 10:46)    GI Updates: 69-year-old male with past medical history of chronic HFrEF s/p ICD, AVR porcine, HTN, PAF s/p ablation, CAD s/p PCI, DM, HLD, COPD (no home O2), smoker presenting with worsening shortness of breath and weakness with concern for acute on chronic CHF exacerbation in the setting of persistent afib with Acute Hypoxemic Respiratory Failure due to pulmonary edema due to acute on chronic HFmrEF. GI consulted for altered LFTs. Patient reports Colonoscopy one and half years ago wnl. Patient denies nausea, vomiting, hematemesis, melena, blood in stool, diarrhea, constipation, abdominal pain.    PAST MEDICAL & SURGICAL HISTORY:   CHF (congestive heart failure)      Diabetes      CAD (coronary artery disease)      Chronic obstructive pulmonary disease (COPD)      HTN (hypertension)      Stented coronary artery      Dyslipidemia      GERD (gastroesophageal reflux disease)      Afib      CVA (cerebral vascular accident)      H/O heart artery stent      Heart valve replaced  aortic      History of Nissen fundoplication            Family history:  FAMILY HISTORY:    No GI cancers in first or second degree relatives    Social History: No smoking. No alcohol. No illegal drug use.    Allergies:   sulfa drugs (Unknown)  Intolerances      MEDICATIONS: Home Medications:  amiodarone 200 mg oral tablet: 1 tab(s) orally once a day (27 Dec 2024 11:06)  apixaban 5 mg oral tablet: 1 tab(s) orally every 12 hours (26 Dec 2024 15:14)  atorvastatin 40 mg oral tablet: 1 tab(s) orally once a day (27 Dec 2024 11:11)  clopidogrel 75 mg oral tablet: 1 tab(s) orally once a day (26 Dec 2024 15:14)  Entresto 24 mg-26 mg oral tablet: 1 tab(s) orally 2 times a day (26 Dec 2024 15:14)  eszopiclone 3 mg oral tablet: 1 tab(s) orally once a day (at bedtime) (26 Dec 2024 15:14)  ezetimibe 10 mg oral tablet: 1 orally once a day (26 Dec 2024 15:14)  fenofibrate 145 mg oral tablet: 1 orally once a day (26 Dec 2024 15:14)  furosemide 40 mg oral tablet: 1 orally once a day (26 Dec 2024 15:14)  metFORMIN 500 mg oral tablet: 1 tab(s) orally 2 times a day (26 Dec 2024 15:14)  metoprolol succinate 200 mg oral capsule, extended release: 1 cap(s) orally once a day (27 Dec 2024 11:07)  Trulicity Pen 1.5 mg/0.5 mL subcutaneous solution: 1.5 milligram(s) subcutaneously once a week (26 Dec 2024 15:14)    MEDICATIONS  (STANDING):  aMIOdarone    Tablet 200 milliGRAM(s) Oral daily  atorvastatin 40 milliGRAM(s) Oral at bedtime  buMETAnide Injectable 1 milliGRAM(s) IV Push daily  cefepime   IVPB 1000 milliGRAM(s) IV Intermittent every 12 hours  chlorhexidine 2% Cloths 1 Application(s) Topical <User Schedule>  clopidogrel Tablet 75 milliGRAM(s) Oral daily  dextrose 5%. 1000 milliLiter(s) (50 mL/Hr) IV Continuous <Continuous>  dextrose 50% Injectable 25 Gram(s) IV Push once  dextrose 50% Injectable 12.5 Gram(s) IV Push once  dextrose 50% Injectable 25 Gram(s) IV Push once  ezetimibe 10 milliGRAM(s) Oral daily  fenofibrate Tablet 145 milliGRAM(s) Oral daily  glucagon  Injectable 1 milliGRAM(s) IntraMuscular once  heparin  Infusion. 900 Unit(s)/Hr (9 mL/Hr) IV Continuous <Continuous>  insulin glargine Injectable (LANTUS) 10 Unit(s) SubCutaneous at bedtime  insulin lispro (ADMELOG) corrective regimen sliding scale   SubCutaneous three times a day before meals  metoprolol tartrate 75 milliGRAM(s) Oral every 6 hours  polyethylene glycol 3350 17 Gram(s) Oral two times a day  senna 2 Tablet(s) Oral at bedtime    MEDICATIONS  (PRN):  albuterol/ipratropium for Nebulization 3 milliLiter(s) Nebulizer every 6 hours PRN Shortness of Breath and/or Wheezing  aluminum hydroxide/magnesium hydroxide/simethicone Suspension 30 milliLiter(s) Oral every 4 hours PRN Dyspepsia  dextrose Oral Gel 15 Gram(s) Oral once PRN Blood Glucose LESS THAN 70 milliGRAM(s)/deciliter  heparin   Injectable 3000 Unit(s) IV Push every 6 hours PRN For aPTT between 40 - 57  heparin   Injectable 6500 Unit(s) IV Push every 6 hours PRN For aPTT less than 40  hydrOXYzine hydrochloride 25 milliGRAM(s) Oral every 6 hours PRN Agitation  melatonin 3 milliGRAM(s) Oral at bedtime PRN Insomnia  ondansetron Injectable 4 milliGRAM(s) IV Push every 8 hours PRN Nausea and/or Vomiting  zolpidem 5 milliGRAM(s) Oral at bedtime PRN Insomnia        REVIEW OF SYSTEMS  General:  No weight loss, fevers, or chills.  Eyes:  No reported pain or visual changes  ENT:  No sore throat or runny nose.  NECK: No stiffness   CV:  +chest pain no palpitations.  Resp:  + shortness of breath, +cough, no wheezing or hemoptysis  GI:  No abdominal pain, nausea, vomiting, dysphagia, diarrhea or constipation. No rectal bleeding, melena, or hematemesis.  Neuro:  No tingling, numbness       VITALS:   T(F): 97.8 (01-06-25 @ 07:01), Max: 98.4 (01-05-25 @ 15:37)  HR: 102 (01-06-25 @ 06:00) (99 - 115)  BP: 149/80 (01-06-25 @ 06:00) (121/75 - 149/83)  RR: 20 (01-06-25 @ 07:01) (20 - 34)  SpO2: 97% (01-06-25 @ 06:00) (95% - 97%)    PHYSICAL EXAM:  GENERAL: AAOx3, no acute distress.  HEAD:  Atraumatic, Normocephalic  EYES: conjunctiva and sclera clear  NECK: Supple, No thyromegaly   CHEST/LUNG: b/l rhonchi  HEART: Regular rate and rhythm; normal S1, S2, No murmurs.  ABDOMEN: Soft, nontender, nondistended; Bowel sounds present  NEUROLOGY: No asterixis or tremor  SKIN: Intact, no jaundice          LABS:  01-06    144  |  99  |  58[H]  ----------------------------<  193[H]  3.6   |  32  |  2.2[H]    Ca    9.4      06 Jan 2025 06:27  Phos  2.8     01-06  Mg     2.3     01-06    TPro  5.8[L]  /  Alb  3.3[L]  /  TBili  0.9  /  DBili  x   /  AST  73[H]  /  ALT  237[H]  /  AlkPhos  77  01-06                          13.4   14.00 )-----------( 296      ( 06 Jan 2025 06:27 )             43.0     LIVER FUNCTIONS - ( 06 Jan 2025 06:27 )  Alb: 3.3 g/dL / Pro: 5.8 g/dL / ALK PHOS: 77 U/L / ALT: 237 U/L / AST: 73 U/L / GGT: x           PTT - ( 06 Jan 2025 06:27 )  PTT:51.3 sec    IMAGING:    < from: US Abdomen Upper Quadrant Right (01.03.25 @ 15:45) >    ACC: [id number] EXAM:  US ABDOMEN RT UPR QUADRANT   ORDERED BY: MONIQUE KEARNS     PROCEDURE DATE:  01/03/2025          INTERPRETATION:  CLINICAL INFORMATION: Elevated LFTs    COMPARISON: None available.    TECHNIQUE: Sonography of the right upper quadrant.    FINDINGS:    Limited portable exam.    Liver: Poorly evaluated.  Bile ducts: Normal caliber. Common bile duct measures 4 mm.  Gallbladder: Within normal limits.  Pancreas: Obscured.  Right kidney: 11.4 cm. No hydronephrosis. 1.0 cm cyst.  Ascites: None.    Right pleural effusion.    IMPRESSION:  Limited exam.    No sonographic evidence of acute cholecystitis.    Right pleural effusion.    --- End of Report ---            REY HAYDEN MD; Attending Radiologist  This document hasbeen electronically signed. Dameon  3 2025  3:56PM    < end of copied text >

## 2025-01-06 NOTE — PROGRESS NOTE ADULT - ASSESSMENT
Patient lying flat on o2. No complaints now. He was volume overloaded. Responding to iV bumex, Continue tdy. Dily weight. Check lytes. Po bumex doon. Low Na diet, Continue beta

## 2025-01-07 LAB
ALBUMIN SERPL ELPH-MCNC: 3.3 G/DL — LOW (ref 3.5–5.2)
ALP SERPL-CCNC: 76 U/L — SIGNIFICANT CHANGE UP (ref 30–115)
ALT FLD-CCNC: 168 U/L — HIGH (ref 0–41)
AMMONIA BLD-MCNC: 34 UMOL/L — SIGNIFICANT CHANGE UP (ref 11–55)
ANION GAP SERPL CALC-SCNC: 11 MMOL/L — SIGNIFICANT CHANGE UP (ref 7–14)
APTT BLD: 47.1 SEC — HIGH (ref 27–39.2)
APTT BLD: 53.3 SEC — HIGH (ref 27–39.2)
APTT BLD: 92.4 SEC — CRITICAL HIGH (ref 27–39.2)
AST SERPL-CCNC: 52 U/L — HIGH (ref 0–41)
BASOPHILS # BLD AUTO: 0.07 K/UL — SIGNIFICANT CHANGE UP (ref 0–0.2)
BASOPHILS NFR BLD AUTO: 0.5 % — SIGNIFICANT CHANGE UP (ref 0–1)
BILIRUB SERPL-MCNC: 0.9 MG/DL — SIGNIFICANT CHANGE UP (ref 0.2–1.2)
BUN SERPL-MCNC: 42 MG/DL — HIGH (ref 10–20)
BUN SERPL-MCNC: 50 MG/DL — HIGH (ref 10–20)
CALCIUM SERPL-MCNC: 9.1 MG/DL — SIGNIFICANT CHANGE UP (ref 8.4–10.5)
CALCIUM SERPL-MCNC: 9.5 MG/DL — SIGNIFICANT CHANGE UP (ref 8.4–10.5)
CHLORIDE SERPL-SCNC: 100 MMOL/L — SIGNIFICANT CHANGE UP (ref 98–110)
CHLORIDE SERPL-SCNC: 104 MMOL/L — SIGNIFICANT CHANGE UP (ref 98–110)
CO2 SERPL-SCNC: 32 MMOL/L — SIGNIFICANT CHANGE UP (ref 17–32)
CO2 SERPL-SCNC: 33 MMOL/L — HIGH (ref 17–32)
CREAT SERPL-MCNC: 1.9 MG/DL — HIGH (ref 0.7–1.5)
CREAT SERPL-MCNC: 2.1 MG/DL — HIGH (ref 0.7–1.5)
EGFR: 33 ML/MIN/1.73M2 — LOW
EGFR: 38 ML/MIN/1.73M2 — LOW
EOSINOPHIL # BLD AUTO: 0.3 K/UL — SIGNIFICANT CHANGE UP (ref 0–0.7)
EOSINOPHIL NFR BLD AUTO: 2.1 % — SIGNIFICANT CHANGE UP (ref 0–8)
GLUCOSE BLDC GLUCOMTR-MCNC: 133 MG/DL — HIGH (ref 70–99)
GLUCOSE BLDC GLUCOMTR-MCNC: 148 MG/DL — HIGH (ref 70–99)
GLUCOSE BLDC GLUCOMTR-MCNC: 162 MG/DL — HIGH (ref 70–99)
GLUCOSE BLDC GLUCOMTR-MCNC: 210 MG/DL — HIGH (ref 70–99)
GLUCOSE SERPL-MCNC: 180 MG/DL — HIGH (ref 70–99)
GLUCOSE SERPL-MCNC: 193 MG/DL — HIGH (ref 70–99)
HCT VFR BLD CALC: 42.8 % — SIGNIFICANT CHANGE UP (ref 42–52)
HGB BLD-MCNC: 13.8 G/DL — LOW (ref 14–18)
IMM GRANULOCYTES NFR BLD AUTO: 0.9 % — HIGH (ref 0.1–0.3)
LYMPHOCYTES # BLD AUTO: 1.72 K/UL — SIGNIFICANT CHANGE UP (ref 1.2–3.4)
LYMPHOCYTES # BLD AUTO: 12.3 % — LOW (ref 20.5–51.1)
MCHC RBC-ENTMCNC: 28.7 PG — SIGNIFICANT CHANGE UP (ref 27–31)
MCHC RBC-ENTMCNC: 32.2 G/DL — SIGNIFICANT CHANGE UP (ref 32–37)
MCV RBC AUTO: 89 FL — SIGNIFICANT CHANGE UP (ref 80–94)
MONOCYTES # BLD AUTO: 1.19 K/UL — HIGH (ref 0.1–0.6)
MONOCYTES NFR BLD AUTO: 8.5 % — SIGNIFICANT CHANGE UP (ref 1.7–9.3)
NEUTROPHILS # BLD AUTO: 10.64 K/UL — HIGH (ref 1.4–6.5)
NEUTROPHILS NFR BLD AUTO: 75.7 % — HIGH (ref 42.2–75.2)
NRBC # BLD: 0 /100 WBCS — SIGNIFICANT CHANGE UP (ref 0–0)
PLATELET # BLD AUTO: 316 K/UL — SIGNIFICANT CHANGE UP (ref 130–400)
PMV BLD: 12.2 FL — HIGH (ref 7.4–10.4)
POTASSIUM SERPL-MCNC: 3.2 MMOL/L — LOW (ref 3.5–5)
POTASSIUM SERPL-MCNC: 3.3 MMOL/L — LOW (ref 3.5–5)
POTASSIUM SERPL-SCNC: 3.2 MMOL/L — LOW (ref 3.5–5)
POTASSIUM SERPL-SCNC: 3.3 MMOL/L — LOW (ref 3.5–5)
PROT SERPL-MCNC: 5.7 G/DL — LOW (ref 6–8)
RBC # BLD: 4.81 M/UL — SIGNIFICANT CHANGE UP (ref 4.7–6.1)
RBC # FLD: 13.2 % — SIGNIFICANT CHANGE UP (ref 11.5–14.5)
SODIUM SERPL-SCNC: 144 MMOL/L — SIGNIFICANT CHANGE UP (ref 135–146)
SODIUM SERPL-SCNC: 147 MMOL/L — HIGH (ref 135–146)
WBC # BLD: 14.04 K/UL — HIGH (ref 4.8–10.8)
WBC # FLD AUTO: 14.04 K/UL — HIGH (ref 4.8–10.8)

## 2025-01-07 PROCEDURE — 99233 SBSQ HOSP IP/OBS HIGH 50: CPT

## 2025-01-07 PROCEDURE — 71045 X-RAY EXAM CHEST 1 VIEW: CPT | Mod: 26

## 2025-01-07 PROCEDURE — 70450 CT HEAD/BRAIN W/O DYE: CPT | Mod: 26

## 2025-01-07 PROCEDURE — 99232 SBSQ HOSP IP/OBS MODERATE 35: CPT

## 2025-01-07 RX ORDER — POTASSIUM CHLORIDE 600 MG/1
20 TABLET, FILM COATED, EXTENDED RELEASE ORAL ONCE
Refills: 0 | Status: COMPLETED | OUTPATIENT
Start: 2025-01-07 | End: 2025-01-07

## 2025-01-07 RX ORDER — NOREPINEPHRINE BITARTRATE 1 MG/ML
0.05 INJECTION INTRAVENOUS
Qty: 8 | Refills: 0 | Status: DISCONTINUED | OUTPATIENT
Start: 2025-01-07 | End: 2025-01-08

## 2025-01-07 RX ORDER — POTASSIUM CHLORIDE 600 MG/1
20 TABLET, FILM COATED, EXTENDED RELEASE ORAL
Refills: 0 | Status: DISCONTINUED | OUTPATIENT
Start: 2025-01-07 | End: 2025-01-07

## 2025-01-07 RX ORDER — LORAZEPAM 1 MG/1
2 TABLET ORAL ONCE
Refills: 0 | Status: DISCONTINUED | OUTPATIENT
Start: 2025-01-07 | End: 2025-01-07

## 2025-01-07 RX ORDER — DEXMEDETOMIDINE HYDROCHLORIDE 4 UG/ML
0.05 INJECTION, SOLUTION INTRAVENOUS
Qty: 400 | Refills: 0 | Status: DISCONTINUED | OUTPATIENT
Start: 2025-01-07 | End: 2025-01-08

## 2025-01-07 RX ORDER — DEXMEDETOMIDINE HYDROCHLORIDE 4 UG/ML
0.05 INJECTION, SOLUTION INTRAVENOUS
Qty: 200 | Refills: 0 | Status: DISCONTINUED | OUTPATIENT
Start: 2025-01-07 | End: 2025-01-07

## 2025-01-07 RX ADMIN — BUMETANIDE 1 MILLIGRAM(S): 2 TABLET ORAL at 05:42

## 2025-01-07 RX ADMIN — LORAZEPAM 2 MILLIGRAM(S): 1 TABLET ORAL at 01:57

## 2025-01-07 RX ADMIN — Medication 75 MILLIGRAM(S): at 00:45

## 2025-01-07 RX ADMIN — Medication 75 MILLIGRAM(S): at 05:42

## 2025-01-07 RX ADMIN — Medication 100 MILLIGRAM(S): at 05:42

## 2025-01-07 RX ADMIN — POTASSIUM CHLORIDE 50 MILLIEQUIVALENT(S): 600 TABLET, FILM COATED, EXTENDED RELEASE ORAL at 22:08

## 2025-01-07 RX ADMIN — HEPARIN SODIUM 1300 UNIT(S)/HR: 1000 INJECTION, SOLUTION INTRAVENOUS; SUBCUTANEOUS at 13:30

## 2025-01-07 RX ADMIN — POTASSIUM CHLORIDE 50 MILLIEQUIVALENT(S): 600 TABLET, FILM COATED, EXTENDED RELEASE ORAL at 13:12

## 2025-01-07 RX ADMIN — HEPARIN SODIUM 1100 UNIT(S)/HR: 1000 INJECTION, SOLUTION INTRAVENOUS; SUBCUTANEOUS at 08:19

## 2025-01-07 RX ADMIN — CHLORHEXIDINE GLUCONATE 1 APPLICATION(S): 1.2 RINSE ORAL at 05:42

## 2025-01-07 RX ADMIN — SENNOSIDES 2 TABLET(S): 8.6 TABLET, FILM COATED ORAL at 22:06

## 2025-01-07 RX ADMIN — Medication 100 MILLIGRAM(S): at 17:18

## 2025-01-07 RX ADMIN — Medication 17 GRAM(S): at 05:43

## 2025-01-07 RX ADMIN — Medication 1: at 17:19

## 2025-01-07 RX ADMIN — ATORVASTATIN CALCIUM 40 MILLIGRAM(S): 40 TABLET, FILM COATED ORAL at 22:06

## 2025-01-07 RX ADMIN — INSULIN GLARGINE-YFGN 10 UNIT(S): 100 INJECTION, SOLUTION SUBCUTANEOUS at 22:44

## 2025-01-07 RX ADMIN — HEPARIN SODIUM 1300 UNIT(S)/HR: 1000 INJECTION, SOLUTION INTRAVENOUS; SUBCUTANEOUS at 22:34

## 2025-01-07 RX ADMIN — AMIODARONE HYDROCHLORIDE 200 MILLIGRAM(S): 200 TABLET ORAL at 05:42

## 2025-01-07 RX ADMIN — Medication 2: at 08:14

## 2025-01-07 RX ADMIN — HEPARIN SODIUM 1100 UNIT(S)/HR: 1000 INJECTION, SOLUTION INTRAVENOUS; SUBCUTANEOUS at 05:20

## 2025-01-07 RX ADMIN — HEPARIN SODIUM 3000 UNIT(S): 1000 INJECTION, SOLUTION INTRAVENOUS; SUBCUTANEOUS at 15:12

## 2025-01-07 RX ADMIN — NOREPINEPHRINE BITARTRATE 7.7 MICROGRAM(S)/KG/MIN: 1 INJECTION INTRAVENOUS at 13:42

## 2025-01-07 RX ADMIN — DEXMEDETOMIDINE HYDROCHLORIDE 1.03 MICROGRAM(S)/KG/HR: 4 INJECTION, SOLUTION INTRAVENOUS at 09:40

## 2025-01-07 NOTE — PROGRESS NOTE ADULT - ASSESSMENT
IMPRESSION:    Acute Hypoxemic Respiratory Failure   Pulmonary Edema  Acute Decompensated HFmrEF   Toxic Metabolic Encephalopathy   Persistent AFIB awaiting Albation   KAREN -   Transaminitis improving   HO ICM s/p CRT-D  HO Bioprosthetic AV  HO CVA  HO DM   HO COPD     PLAN:    CNS:  no sedation   do ct brain   start precedex for agitation to arrange for ct brain   get ammonia level   neuro  HEENT: Oral care      PULMONARY:  HOB @ 45 degrees.  keep pox >92%   refusing cxr   CARDIOVASCULAR: Volume contraction as tolerated, optimize cardiac therapy,   lower bumex to 1 mg daily for now   keep is < os    Rate control, Cardiology follow up, TTE reviewed, proBNP noted. Wean levophed as tolerated, target SpO2 92-96%.   Check EKG.     GI: GI prophylaxis. If no extubation, OG feeds. Bowel regimen. Trend LFTs  improving   . RUQ US. DC statin. GI evaluation.    hold stool softner   RENAL:  Follow up lytes.  Correct as needed.  Consult nephrology. Monitor UO.   follow BUn, cr increased   replace K   lower bumex  BMP after noon   voiding trial   INFECTIOUS DISEASE: Follow up cultures, ABX per ID: cefepime. RVP negative. UA noted.   today last dose cefepime   HEMATOLOGICAL: Full AC. Monitor CBC and coags. target PTT around 70     ENDOCRINE:  Follow up FS.  Insulin protocol if needed.    MUSCULOSKELETAL: bedrest for now    Goals of care    keep SDU for now

## 2025-01-07 NOTE — PROGRESS NOTE ADULT - SUBJECTIVE AND OBJECTIVE BOX
Patient is a 69y old  Male who presents with a chief complaint of CHF exacerbation (07 Jan 2025 08:48)      Over Night Events:  Patient seen and examined.   confused agitated over night given ativan   this morning awake but confused     ROS:  See HPI    PHYSICAL EXAM    ICU Vital Signs Last 24 Hrs  T(C): 36.2 (07 Jan 2025 07:01), Max: 36.6 (06 Jan 2025 23:36)  T(F): 97.2 (07 Jan 2025 07:01), Max: 97.8 (06 Jan 2025 23:36)  HR: 97 (07 Jan 2025 07:10) (96 - 106)  BP: 144/80 (07 Jan 2025 07:10) (124/86 - 162/86)  BP(mean): 106 (07 Jan 2025 07:10) (101 - 117)  ABP: --  ABP(mean): --  RR: 22 (07 Jan 2025 07:10) (20 - 35)  SpO2: 99% (07 Jan 2025 07:10) (91% - 99%)    O2 Parameters below as of 06 Jan 2025 22:28  Patient On (Oxygen Delivery Method): nasal cannula  O2 Flow (L/min): 3          General: awake   HEENT:      teodoro          Lymph Nodes: NO cervical LN   Lungs: Bilateral BS  Cardiovascular: Regular   Abdomen: Soft, Positive BS  Extremities: No clubbing   Skin: warm   Neurological: no focal   Musculoskeletal: move all ext     I&O's Detail    06 Jan 2025 07:01  -  07 Jan 2025 07:00  --------------------------------------------------------  IN:    Heparin Infusion: 123 mL    Oral Fluid: 480 mL  Total IN: 603 mL    OUT:    Indwelling Catheter - Urethral (mL): 1350 mL    Rectal Tube (mL): 52 mL  Total OUT: 1402 mL    Total NET: -799 mL      07 Jan 2025 07:01  -  07 Jan 2025 09:05  --------------------------------------------------------  IN:    Heparin Infusion: 11 mL  Total IN: 11 mL    OUT:  Total OUT: 0 mL    Total NET: 11 mL          LABS:                          13.8   14.04 )-----------( 316      ( 07 Jan 2025 05:52 )             42.8         07 Jan 2025 05:52    144    |  100    |  50     ----------------------------<  193    3.2     |  33     |  2.1      Ca    9.5        07 Jan 2025 05:52  Phos  2.8       06 Jan 2025 06:27  Mg     2.3       06 Jan 2025 06:27    TPro  5.7    /  Alb  3.3    /  TBili  0.9    /  DBili  x      /  AST  52     /  ALT  168    /  AlkPhos  76     07 Jan 2025 05:52  Amylase x     lipase x                                                 PTT - ( 07 Jan 2025 04:26 )  PTT:47.1 sec                                       Urinalysis Basic - ( 07 Jan 2025 05:52 )    Color: x / Appearance: x / SG: x / pH: x  Gluc: 193 mg/dL / Ketone: x  / Bili: x / Urobili: x   Blood: x / Protein: x / Nitrite: x   Leuk Esterase: x / RBC: x / WBC x   Sq Epi: x / Non Sq Epi: x / Bacteria: x                                                                                                                                                     MEDICATIONS  (STANDING):  aMIOdarone    Tablet 200 milliGRAM(s) Oral daily  atorvastatin 40 milliGRAM(s) Oral at bedtime  buMETAnide Injectable 1 milliGRAM(s) IV Push daily  cefepime   IVPB 1000 milliGRAM(s) IV Intermittent every 12 hours  chlorhexidine 2% Cloths 1 Application(s) Topical <User Schedule>  clopidogrel Tablet 75 milliGRAM(s) Oral daily  dextrose 5%. 1000 milliLiter(s) (50 mL/Hr) IV Continuous <Continuous>  dextrose 50% Injectable 25 Gram(s) IV Push once  dextrose 50% Injectable 12.5 Gram(s) IV Push once  dextrose 50% Injectable 25 Gram(s) IV Push once  ezetimibe 10 milliGRAM(s) Oral daily  fenofibrate Tablet 145 milliGRAM(s) Oral daily  glucagon  Injectable 1 milliGRAM(s) IntraMuscular once  heparin  Infusion. 900 Unit(s)/Hr (9 mL/Hr) IV Continuous <Continuous>  insulin glargine Injectable (LANTUS) 10 Unit(s) SubCutaneous at bedtime  insulin lispro (ADMELOG) corrective regimen sliding scale   SubCutaneous three times a day before meals  metoprolol tartrate 75 milliGRAM(s) Oral every 6 hours  polyethylene glycol 3350 17 Gram(s) Oral two times a day  senna 2 Tablet(s) Oral at bedtime    MEDICATIONS  (PRN):  albuterol/ipratropium for Nebulization 3 milliLiter(s) Nebulizer every 6 hours PRN Shortness of Breath and/or Wheezing  aluminum hydroxide/magnesium hydroxide/simethicone Suspension 30 milliLiter(s) Oral every 4 hours PRN Dyspepsia  dextrose Oral Gel 15 Gram(s) Oral once PRN Blood Glucose LESS THAN 70 milliGRAM(s)/deciliter  heparin   Injectable 6500 Unit(s) IV Push every 6 hours PRN For aPTT less than 40  heparin   Injectable 3000 Unit(s) IV Push every 6 hours PRN For aPTT between 40 - 57  hydrOXYzine hydrochloride 25 milliGRAM(s) Oral every 6 hours PRN Agitation  melatonin 3 milliGRAM(s) Oral at bedtime PRN Insomnia  ondansetron Injectable 4 milliGRAM(s) IV Push every 8 hours PRN Nausea and/or Vomiting  zolpidem 5 milliGRAM(s) Oral at bedtime PRN Insomnia          Xrays:                                                                                 ECHO:  [facility] ICU:

## 2025-01-07 NOTE — PROGRESS NOTE ADULT - SUBJECTIVE AND OBJECTIVE BOX
Patient seen and evaluated this am, confused and agitated at times       T(F): 97.1 (01-07-25 @ 15:05), Max: 97.8 (01-06-25 @ 23:36)  HR: 87 (01-07-25 @ 16:00)  BP: 123/71 (01-07-25 @ 16:00)  RR: 24 (01-07-25 @ 16:00)  SpO2: 92% (01-07-25 @ 16:00) (91% - 100%)    PHYSICAL EXAM:  GENERAL: NAD  HEAD:  Atraumatic, Normocephalic  EYES: EOMI, PERRLA, conjunctiva and sclera clear  NERVOUS SYSTEM:  no focal deficits   CHEST/LUNG:  bilateral rales  HEART: Regular rate and rhythm; No murmurs, rubs, or gallops  ABDOMEN: Soft, Nontender, Nondistended; Bowel sounds present  EXTREMITIES:  2+ Peripheral Pulses, No clubbing, cyanosis, or edema    LABS  01-07    144  |  100  |  50[H]  ----------------------------<  193[H]  3.2[L]   |  33[H]  |  2.1[H]    Ca    9.5      07 Jan 2025 05:52  Phos  2.8     01-06  Mg     2.3     01-06    TPro  5.7[L]  /  Alb  3.3[L]  /  TBili  0.9  /  DBili  x   /  AST  52[H]  /  ALT  168[H]  /  AlkPhos  76  01-07                          13.8   14.04 )-----------( 316      ( 07 Jan 2025 05:52 )             42.8     PTT - ( 07 Jan 2025 10:32 )  PTT:53.3 sec    < from: TTE Echo Complete w/o Contrast w/ Doppler (12.27.24 @ 12:57) >    Summary:   1. Left ventricular ejection fraction, by visual estimation, is 40 to   45%.   2. Mildly enlarged left atrium.   3. Mild mitral annular calcification.   4. Mild mitral valve regurgitation.   5. Mild tricuspid regurgitation.   6. Bioprosthesis in the aortic position.   7. Ascending aorta 3.7 cm.   8. Estimated pulmonary artery systolic pressure is 35.7 mmHg assuming a   right atrial pressure of 3 mmHg, which is consistent with borderline   pulmonary hypertension.    < end of copied text >      Culture Results:   No growth at 5 days (12-31-24)    RADIOLOGY  < from: Xray Chest 1 View- PORTABLE-Routine (Xray Chest 1 View- PORTABLE-Routine in AM.) (01.07.25 @ 07:16) >    IMPRESSION:    Mild increased bilateral opacities.    < end of copied text >  < from: CT Head No Cont (01.07.25 @ 12:52) >  IMPRESSION:    No evidence of acute intracranial pathology. Stable exam      < end of copied text >    MEDICATIONS  (STANDING):  aMIOdarone    Tablet 200 milliGRAM(s) Oral daily  atorvastatin 40 milliGRAM(s) Oral at bedtime  buMETAnide Injectable 1 milliGRAM(s) IV Push daily  cefepime   IVPB 1000 milliGRAM(s) IV Intermittent every 12 hours  chlorhexidine 2% Cloths 1 Application(s) Topical <User Schedule>  clopidogrel Tablet 75 milliGRAM(s) Oral daily  dexMEDEtomidine Infusion 0.05 MICROgram(s)/kG/Hr (1.03 mL/Hr) IV Continuous <Continuous>  ezetimibe 10 milliGRAM(s) Oral daily  fenofibrate Tablet 145 milliGRAM(s) Oral daily  heparin  Infusion. 900 Unit(s)/Hr (9 mL/Hr) IV Continuous <Continuous>  insulin glargine Injectable (LANTUS) 10 Unit(s) SubCutaneous at bedtime  insulin lispro (ADMELOG) corrective regimen sliding scale   SubCutaneous three times a day before meals  metoprolol tartrate 75 milliGRAM(s) Oral every 6 hours  norepinephrine Infusion 0.05 MICROgram(s)/kG/Min (7.7 mL/Hr) IV Continuous <Continuous>  polyethylene glycol 3350 17 Gram(s) Oral two times a day  senna 2 Tablet(s) Oral at bedtime    MEDICATIONS  (PRN):  albuterol/ipratropium for Nebulization 3 milliLiter(s) Nebulizer every 6 hours PRN Shortness of Breath and/or Wheezing  aluminum hydroxide/magnesium hydroxide/simethicone Suspension 30 milliLiter(s) Oral every 4 hours PRN Dyspepsia  dextrose Oral Gel 15 Gram(s) Oral once PRN Blood Glucose LESS THAN 70 milliGRAM(s)/deciliter  heparin   Injectable 6500 Unit(s) IV Push every 6 hours PRN For aPTT less than 40  heparin   Injectable 3000 Unit(s) IV Push every 6 hours PRN For aPTT between 40 - 57  hydrOXYzine hydrochloride 25 milliGRAM(s) Oral every 6 hours PRN Agitation  melatonin 3 milliGRAM(s) Oral at bedtime PRN Insomnia  ondansetron Injectable 4 milliGRAM(s) IV Push every 8 hours PRN Nausea and/or Vomiting  zolpidem 5 milliGRAM(s) Oral at bedtime PRN Insomnia

## 2025-01-07 NOTE — PROGRESS NOTE ADULT - ASSESSMENT
This is a 69-year-old male with past medical history of chronic HFrEF s/p ICD, AVR porcine, HTN, PAF s/p ablation, CAD s/p PCI, DM, HLD, COPD (no home O2), smoker presenting with worsening shortness of breath and weakness.    IMPRESSION  #Acute hypoxic respiratory failure   Likely from V-Tach and heart failure exacerbation. Cardiogenic shock  #High Fevers- Perhaps Aspirational pneumonitis  #Leukocytosis-Likely reactive.   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  Blood cultures NGTD  MRSA nares negative    RECOMMENDATIONS  -Unclear why was the patient on abx initially.   -Collect Sputum cultures.  -US liver and Kidneys  -IV Cefepime 2 gram q12 hours for now. (S/D 12/31)  -Cardiology/EP follow up.   -Off loading to prevent pressure sores and preventive measures to avoid aspiration. TOV when able.   -Guarded prognosis.      If any questions, please send a message or call on [facility] Teams  Please continue to update ID with any pertinent new laboratory or radiographic findings.    Benigno Pond M.D  Infectious Diseases Attending/   [employer] at [facility]   This is a 69-year-old male with past medical history of chronic HFrEF s/p ICD, AVR porcine, HTN, PAF s/p ablation, CAD s/p PCI, DM, HLD, COPD (no home O2), smoker presenting with worsening shortness of breath and weakness.    IMPRESSION  #Acute hypoxic respiratory failure-Resolved.   Likely from V-Tach and heart failure exacerbation. Cardiogenic shock  #High Fevers- Perhaps Aspirational pneumonitis  #Leukocytosis-Likely reactive.   #Aortic Bioprosthetic valve  #Heart failure with reduced EF, +Pacemaker  #KAREN-Improved.   #Transaminitis- Possibly from hypotension/shock-Resolved.   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  Blood cultures NGTD  MRSA nares negative    RECOMMENDATIONS  -Unclear why was the patient on abx initially.   -Self Extubated 1/4/2024.   -Finished course of cefepime 1/7/2025.   -Cardiology/EP follow up.   -Off loading to prevent pressure sores and preventive measures to avoid aspiration. TOV when able.   -Guarded prognosis.      If any questions, please send a message or call on [employer] Teams  Please continue to update ID with any pertinent new laboratory or radiographic findings.    Benigno Pond M.D  Infectious Diseases Attending/   [employer] at [facility]   This is a 69-year-old male with past medical history of chronic HFrEF s/p ICD, AVR porcine, HTN, PAF s/p ablation, CAD s/p PCI, DM, HLD, COPD (no home O2), smoker presenting with worsening shortness of breath and weakness.    IMPRESSION  #Acute hypoxic respiratory failure-Resolved.   Likely from V-Tach and heart failure exacerbation. Cardiogenic shock  #High Fevers- Perhaps Aspirational pneumonitis  #Leukocytosis-Likely reactive.   #Aortic Bioprosthetic valve  #Heart failure with reduced EF, +Pacemaker  #KAREN-Improved.   #Transaminitis- Possibly from hypotension/shock-Resolved.   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  Blood cultures NGTD  MRSA nares negative    RECOMMENDATIONS  -Unclear why was the patient on abx initially.   -Self Extubated 1/4/2024.   -Finish course of cefepime 1/7/2025. After that monitor off abx.   -Cardiology/EP follow up.   -Off loading to prevent pressure sores and preventive measures to avoid aspiration. TOV when able.   -Guarded prognosis.      If any questions, please send a message or call on [employer] Teams  Please continue to update ID with any pertinent new laboratory or radiographic findings.    Benigno Pond M.D  Infectious Diseases Attending/   [employer] at [facility]

## 2025-01-07 NOTE — PROGRESS NOTE ADULT - ASSESSMENT
69-year-old male with past medical history of chronic HFrEF s/p ICD, AVR porcine, HTN, PAF s/p ablation, CAD s/p PCI, DM, HLD, COPD (no home O2), smoker presented 12/26  with worsening shortness of breath and weakness.   presented w/ Cr 1.5 ( looking back has some CKD (eGFR < 60 since at least October 2024)     Cr hua acutely 12/31 1.1 - > 1.7 stabilized a little over 2  Did not receive IV contrast, was on Levophed so likely has ATN  No urine studies available, iyer had gross hematuria (? from placement he denies hematuria prior to iyer)   No hydro on Rt kidney on Abd US 1/3       Suggest  - send UA and prot/cr ratio   - agree with decreasing bumex  - monitor bicarb levevl  - replete K to 4, Mg to 2 as needed  - BP well controlled, off levo  - has mild hypercalcemia, check PTH   69-year-old male with past medical history of chronic HFrEF s/p ICD, AVR porcine, HTN, PAF s/p ablation, CAD s/p PCI, DM, HLD, COPD (no home O2), smoker presented 12/26  with worsening shortness of breath and weakness.   presented w/ Cr 1.5 ( looking back has some CKD (eGFR < 60 since at least October 2024)     Cr hua acutely 12/31 1.1 - > 1.7 stabilized a little over 2  Did not receive IV contrast, was on Levophed so likely has ATN  No urine studies available, iyer had gross hematuria (? from placement he denies hematuria prior to iyer)   No hydro on Rt kidney on Abd US 1/3       Suggest  - send UA and prot/cr ratio   - agree with decreasing bumex  - monitor bicarb levevl  - replete K to 4, Mg to 2 as needed  - BP well controlled, off levo  - has mild hypercalcemia, check PTH  - obtain renal/bladdr ultrasound

## 2025-01-07 NOTE — PROGRESS NOTE ADULT - ASSESSMENT
69-year-old male with past medical history of chronic HFrEF s/p ICD, AVR porcine, HTN, PAF s/p ablation, CAD s/p PCI, DM, HLD, COPD (no home O2), smoker presenting with worsening shortness of breath and weakness with concern for acute on chronic CHF exacerbation in the setting of persistent afib        Acute Hypoxemic Respiratory Failure due to pulmonary edema due to acute on chronic HFmrEF / metabolic encephalopathy    - CT head negative  - complete course of Cefepime  - CXR still with opacities - continue IV Bumex   - supplement hypokalemia    Persistent AFIB   - Ablation as oupt

## 2025-01-07 NOTE — CHART NOTE - NSCHARTNOTEFT_GEN_A_CORE
pt 's very agitated , trying to get out of bed this. possible sundowning   Pt's qtc interval 546 cannot give haldol.  Will give 1 dose ativan IVP

## 2025-01-07 NOTE — PROGRESS NOTE ADULT - SUBJECTIVE AND OBJECTIVE BOX
Nephrology Progress Note    MONIQUE LYONS  MRN-[id number]  69y  Male    S:  Patient is seen and examined, events over the last 24h noted.    O:  Allergies:  sulfa drugs (Unknown)    Hospital Medications:   MEDICATIONS  (STANDING):  aMIOdarone    Tablet 200 milliGRAM(s) Oral daily  atorvastatin 40 milliGRAM(s) Oral at bedtime  buMETAnide Injectable 1 milliGRAM(s) IV Push daily  cefepime   IVPB 1000 milliGRAM(s) IV Intermittent every 12 hours  chlorhexidine 2% Cloths 1 Application(s) Topical <User Schedule>  clopidogrel Tablet 75 milliGRAM(s) Oral daily  dexMEDEtomidine Infusion 0.05 MICROgram(s)/kG/Hr (1.03 mL/Hr) IV Continuous <Continuous>  ezetimibe 10 milliGRAM(s) Oral daily  fenofibrate Tablet 145 milliGRAM(s) Oral daily  glucagon  Injectable 1 milliGRAM(s) IntraMuscular once  heparin  Infusion. 900 Unit(s)/Hr (9 mL/Hr) IV Continuous <Continuous>  insulin glargine Injectable (LANTUS) 10 Unit(s) SubCutaneous at bedtime  insulin lispro (ADMELOG) corrective regimen sliding scale   SubCutaneous three times a day before meals  metoprolol tartrate 75 milliGRAM(s) Oral every 6 hours  norepinephrine Infusion 0.05 MICROgram(s)/kG/Min (7.7 mL/Hr) IV Continuous <Continuous>  polyethylene glycol 3350 17 Gram(s) Oral two times a day  senna 2 Tablet(s) Oral at bedtime    MEDICATIONS  (PRN):  albuterol/ipratropium for Nebulization 3 milliLiter(s) Nebulizer every 6 hours PRN Shortness of Breath and/or Wheezing  aluminum hydroxide/magnesium hydroxide/simethicone Suspension 30 milliLiter(s) Oral every 4 hours PRN Dyspepsia  dextrose Oral Gel 15 Gram(s) Oral once PRN Blood Glucose LESS THAN 70 milliGRAM(s)/deciliter  heparin   Injectable 6500 Unit(s) IV Push every 6 hours PRN For aPTT less than 40  heparin   Injectable 3000 Unit(s) IV Push every 6 hours PRN For aPTT between 40 - 57  hydrOXYzine hydrochloride 25 milliGRAM(s) Oral every 6 hours PRN Agitation  melatonin 3 milliGRAM(s) Oral at bedtime PRN Insomnia  ondansetron Injectable 4 milliGRAM(s) IV Push every 8 hours PRN Nausea and/or Vomiting  zolpidem 5 milliGRAM(s) Oral at bedtime PRN Insomnia    Home Medications:  amiodarone 200 mg oral tablet: 1 tab(s) orally once a day (27 Dec 2024 11:06)  apixaban 5 mg oral tablet: 1 tab(s) orally every 12 hours (26 Dec 2024 15:14)  atorvastatin 40 mg oral tablet: 1 tab(s) orally once a day (27 Dec 2024 11:11)  clopidogrel 75 mg oral tablet: 1 tab(s) orally once a day (26 Dec 2024 15:14)  Entresto 24 mg-26 mg oral tablet: 1 tab(s) orally 2 times a day (26 Dec 2024 15:14)  eszopiclone 3 mg oral tablet: 1 tab(s) orally once a day (at bedtime) (26 Dec 2024 15:14)  ezetimibe 10 mg oral tablet: 1 orally once a day (26 Dec 2024 15:14)  fenofibrate 145 mg oral tablet: 1 orally once a day (26 Dec 2024 15:14)  furosemide 40 mg oral tablet: 1 orally once a day (26 Dec 2024 15:14)  metFORMIN 500 mg oral tablet: 1 tab(s) orally 2 times a day (26 Dec 2024 15:14)  metoprolol succinate 200 mg oral capsule, extended release: 1 cap(s) orally once a day (27 Dec 2024 11:07)  Trulicity Pen 1.5 mg/0.5 mL subcutaneous solution: 1.5 milligram(s) subcutaneously once a week (26 Dec 2024 15:14)      VITALS:  Daily     Daily Weight in k.2 (2025 07:01)  T(F): 97.1 (25 @ 15:05), Max: 97.8 (25 @ 23:36)  HR: 87 (25 @ 16:00)  BP: 123/71 (25 @ 16:00)  RR: 24 (25 @ 16:00)  SpO2: 92% (25 @ 16:00)  Wt(kg): --  I&O's Detail    2025 07:01  -  2025 07:00  --------------------------------------------------------  IN:    Heparin Infusion: 123 mL    Oral Fluid: 480 mL  Total IN: 603 mL    OUT:    Indwelling Catheter - Urethral (mL): 1350 mL    Rectal Tube (mL): 52 mL  Total OUT: 1402 mL    Total NET: -799 mL      2025 07: 18:10  --------------------------------------------------------  IN:    Dexmedetomidine: 43.6 mL    Heparin Infusion: 129 mL    IV PiggyBack: 50 mL    Norepinephrine: 28 mL  Total IN: 250.6 mL    OUT:    Indwelling Catheter - Urethral (mL): 1440 mL    Rectal Tube (mL): 0 mL  Total OUT: 1440 mL    Total NET: -1189.4 mL        I&O's Summary    2025 07: 07:00  --------------------------------------------------------  IN: 603 mL / OUT: 1402 mL / NET: -799 mL    2025 07: 18:10  --------------------------------------------------------  IN: 250.6 mL / OUT: 1440 mL / NET: -1189.4 mL          PHYSICAL EXAM:  Gen: NAD  Chest: b/l breath sounds  Abd: soft  Extremities: no edema      LABS:          144  |  100  |  50[H]  ----------------------------<  193[H]  3.2[L]   |  33[H]  |  2.1[H]    Ca    9.5      2025 05:52  Phos  2.8       Mg     2.3         TPro  5.7[L]  /  Alb  3.3[L]  /  TBili  0.9  /  DBili      /  AST  52[H]  /  ALT  168[H]  /  AlkPhos  76      Phosphorus: 2.8 mg/dL (25 @ 06:27)  Phosphorus: 3.6 mg/dL (25 @ 05:53)                            13.8   14.04 )-----------( 316      ( 2025 05:52 )             42.8     Mean Cell Volume: 89.0 fL (25 @ 05:52)          Urine Studies:          Culture Results:   No growth at 5 days ( @ 15:45)  Culture Results:   No growth at 5 days ( @ 12:50)    Creatinine trend:  Creatinine: 2.1 mg/dL (25 @ 05:52)  Creatinine: 2.2 mg/dL (25 @ 06:27)  Creatinine: 2.5 mg/dL (25 @ 05:53)  Creatinine: 2.2 mg/dL (25 @ 05:58)  Creatinine: 1.8 mg/dL (25 @ 05:34)

## 2025-01-07 NOTE — PROGRESS NOTE ADULT - SUBJECTIVE AND OBJECTIVE BOX
KOMALMONIQUE CHRISTINE  69y, Male    LOS  11d    INTERVAL EVENTS/HPI  - No acute events overnight  - T(F): , Max: 97.8 (01-06-25 @ 23:36)  - WBC Count: 14.04 (01-07-25 @ 05:52)  WBC Count: 14.00 (01-06-25 @ 06:27)  - Creatinine: 2.1 (01-07-25 @ 05:52)  Creatinine: 2.2 (01-06-25 @ 06:27)     REVIEW OF SYSTEMS:  CONSTITUTIONAL: No fever or chills  HEENT: No sore throat  RESPIRATORY: No cough, no shortness of breath  CARDIOVASCULAR: No chest pain or palpitations  GASTROINTESTINAL: No abdominal or epigastric pain  GENITOURINARY: No dysuria  NEUROLOGICAL: No headache/dizziness  MSK: No joint pain, erythema, or swelling; no back pain  SKIN: No itching, rashes  All other ROS negative except noted above    Prior hospital charts reviewed [Yes]  Primary team notes reviewed [Yes]  Other consultant notes reviewed [Yes]    ANTIMICROBIALS:   cefepime   IVPB 1000 every 12 hours    MEDICATIONS  (STANDING):  azithromycin  IVPB   255 mL/Hr IV Intermittent (12-30-24 @ 12:35)   255 mL/Hr IV Intermittent (12-29-24 @ 12:19)   255 mL/Hr IV Intermittent (12-28-24 @ 12:42)    azithromycin  IVPB   500 milliGRAM(s) IV Intermittent (12-27-24 @ 12:44)    cefepime   IVPB   100 mL/Hr IV Intermittent (01-07-25 @ 05:42)   100 mL/Hr IV Intermittent (01-06-25 @ 18:40)   100 mL/Hr IV Intermittent (01-06-25 @ 06:15)   100 mL/Hr IV Intermittent (01-05-25 @ 17:27)   100 mL/Hr IV Intermittent (01-05-25 @ 05:01)   100 mL/Hr IV Intermittent (01-04-25 @ 18:43)    cefepime   IVPB   100 mL/Hr IV Intermittent (12-31-24 @ 15:17)    cefepime   IVPB   100 mL/Hr IV Intermittent (01-04-25 @ 05:21)   100 mL/Hr IV Intermittent (01-03-25 @ 17:56)   100 mL/Hr IV Intermittent (01-03-25 @ 05:02)   100 mL/Hr IV Intermittent (01-02-25 @ 17:48)   100 mL/Hr IV Intermittent (01-02-25 @ 05:38)   100 mL/Hr IV Intermittent (01-01-25 @ 18:56)   100 mL/Hr IV Intermittent (01-01-25 @ 05:15)    cefTRIAXone   IVPB   100 mL/Hr IV Intermittent (12-30-24 @ 11:55)   100 mL/Hr IV Intermittent (12-29-24 @ 12:20)   100 mL/Hr IV Intermittent (12-28-24 @ 12:43)   100 mL/Hr IV Intermittent (12-27-24 @ 12:44)    vancomycin  IVPB   166.67 mL/Hr IV Intermittent (12-31-24 @ 17:30)    vancomycin  IVPB   166.67 mL/Hr IV Intermittent (01-01-25 @ 05:14)        OTHER MEDS: MEDICATIONS  (STANDING):  albuterol/ipratropium for Nebulization 3 every 6 hours PRN  aluminum hydroxide/magnesium hydroxide/simethicone Suspension 30 every 4 hours PRN  aMIOdarone    Tablet 200 daily  atorvastatin 40 at bedtime  buMETAnide Injectable 1 daily  clopidogrel Tablet 75 daily  dextrose 50% Injectable 25 once  dextrose 50% Injectable 12.5 once  dextrose 50% Injectable 25 once  dextrose Oral Gel 15 once PRN  ezetimibe 10 daily  fenofibrate Tablet 145 daily  glucagon  Injectable 1 once  heparin   Injectable 6500 every 6 hours PRN  heparin   Injectable 3000 every 6 hours PRN  heparin  Infusion. 900 <Continuous>  hydrOXYzine hydrochloride 25 every 6 hours PRN  insulin glargine Injectable (LANTUS) 10 at bedtime  insulin lispro (ADMELOG) corrective regimen sliding scale  three times a day before meals  melatonin 3 at bedtime PRN  metoprolol tartrate 75 every 6 hours  ondansetron Injectable 4 every 8 hours PRN  polyethylene glycol 3350 17 two times a day  senna 2 at bedtime  zolpidem 5 at bedtime PRN      Vital Signs Last 24 Hrs  T(F): 97.2 (01-07-25 @ 07:01), Max: 104.4 (12-31-24 @ 15:05)    Vital Signs Last 24 Hrs  HR: 97 (01-07-25 @ 07:10) (96 - 106)  BP: 144/80 (01-07-25 @ 07:10) (124/86 - 162/86)  RR: 22 (01-07-25 @ 07:10)  SpO2: 99% (01-07-25 @ 07:10) (91% - 99%)  Wt(kg): --    EXAM:  GENERAL: In NAD  HEAD: No head lesions  NECK: Supple  CHEST/LUNG: Shallow breath sounds.   HEART: S1 S2  ABDOMEN: Soft, nontender, Distended.   EXTREMITIES: No clubbing, cyanosis, or petal edema  MSK: No joint erythema, swelling or pain  SKIN: No rashes or lesions, no superficial thrombophlebitis    Labs:                        13.8   14.04 )-----------( 316      ( 07 Jan 2025 05:52 )             42.8     01-07    144  |  100  |  50[H]  ----------------------------<  193[H]  3.2[L]   |  33[H]  |  2.1[H]    Ca    9.5      07 Jan 2025 05:52  Phos  2.8     01-06  Mg     2.3     01-06    TPro  5.7[L]  /  Alb  3.3[L]  /  TBili  0.9  /  DBili  x   /  AST  52[H]  /  ALT  168[H]  /  AlkPhos  76  01-07      WBC Trend:  WBC Count: 14.04 (01-07-25 @ 05:52)  WBC Count: 14.00 (01-06-25 @ 06:27)  WBC Count: 15.36 (01-05-25 @ 05:53)  WBC Count: 15.76 (01-04-25 @ 05:58)      Creatine Trend:  Creatinine: 2.1 (01-07)  Creatinine: 2.2 (01-06)  Creatinine: 2.5 (01-05)  Creatinine: 2.2 (01-04)      Liver Biochemical Testing Trend:  Alanine Aminotransferase (ALT/SGPT): 168 *H* (01-07)  Alanine Aminotransferase (ALT/SGPT): 237 *H* (01-06)  Alanine Aminotransferase (ALT/SGPT): 357 *H* (01-05)  Alanine Aminotransferase (ALT/SGPT): 543 *H* (01-04)  Alanine Aminotransferase (ALT/SGPT): 967 *H* (01-03)  Aspartate Aminotransferase (AST/SGOT): 52 (01-07-25 @ 05:52)  Aspartate Aminotransferase (AST/SGOT): 73 (01-06-25 @ 06:27)  Aspartate Aminotransferase (AST/SGOT): 128 (01-05-25 @ 05:53)  Aspartate Aminotransferase (AST/SGOT): 338 (01-04-25 @ 05:58)  Aspartate Aminotransferase (AST/SGOT): 1585 (01-03-25 @ 10:33)  Bilirubin Total: 0.9 (01-07)  Bilirubin Total: 0.9 (01-06)  Bilirubin Total: 0.8 (01-05)  Bilirubin Total: 0.5 (01-04)  Bilirubin Direct: 0.2 (01-03)      Trend LDH      Urinalysis Basic - ( 07 Jan 2025 05:52 )    Color: x / Appearance: x / SG: x / pH: x  Gluc: 193 mg/dL / Ketone: x  / Bili: x / Urobili: x   Blood: x / Protein: x / Nitrite: x   Leuk Esterase: x / RBC: x / WBC x   Sq Epi: x / Non Sq Epi: x / Bacteria: x        MICROBIOLOGY:    Male    Culture - Blood (collected 31 Dec 2024 15:45)  Source: .Blood BLOOD  Final Report:    No growth at 5 days    Culture - Blood (collected 03 Sep 2023 12:50)  Source: .Blood Blood  Final Report:    No growth at 5 days    Culture - Blood (collected 03 Sep 2023 12:50)  Source: .Blood Blood  Final Report:    No growth at 5 days      HIV-1/2 Combo Result: Nonreact (01-04-25 @ 05:58)      Rapid RVP Result: NotDetec (01-02 @ 20:30)    Procalcitonin: 0.68 (01-03)    Troponin T, High Sensitivity Result: 42 (12-31)    Blood Gas Arterial, Lactate: 1.3 (01-04 @ 09:33)        RADIOLOGY & ADDITIONAL TESTS:  I have personally reviewed the relevant images.   CXR      CT    < from: Xray Chest 1 View- PORTABLE-Routine (Xray Chest 1 View- PORTABLE-Routine in AM.) (01.05.25 @ 06:19) >    INTERPRETATION:  CLINICAL HISTORY: Line placement    COMPARISON: January 4, 2025.    TECHNIQUE: Portable frontal chest radiograph.    FINDINGS:    Support devices: Left chest wall AICD    Cardiac/mediastinum/hilum: Stable.    Lung parenchyma/Pleura: Bibasilar opacities. No pneumothorax.    Skeleton/soft tissues: Stable.      IMPRESSION:    Bibasilar opacities.    --- End of Report ---      < end of copied text >    < from: CT Chest No Cont (01.03.25 @ 16:13) >  IMPRESSION:  CHEST:  Bilateral lower lobe small pleural effusions and atelectasis.    Stable right middle lobe 8 mm nodule.    ABDOMEN/PELVIS:  Subacute appearing L2 compression deformity.    Left adrenal nodule measuring 2.1 cm. Nonemergent/outpatient MRI may be   obtained for further characterization    --- End of Report ---    < end of copied text >  < from: US Abdomen Upper Quadrant Right (01.03.25 @ 15:45) >  IMPRESSION:  Limited exam.    No sonographic evidence of acute cholecystitis.    Right pleural effusion.    --- End of Report ---    < end of copied text >    WEIGHT  Weight (kg): 82.1 (12-27-24 @ 16:33)  Creatinine: 2.1 mg/dL (01-07-25 @ 05:52)      All available historical records have been reviewed       KOMALMONIQUE CHRISTINE  69y, Male    LOS  11d    INTERVAL EVENTS/HPI  - No acute events overnight  - T(F): , Max: 97.8 (01-06-25 @ 23:36)  - WBC Count: 14.04 (01-07-25 @ 05:52)  WBC Count: 14.00 (01-06-25 @ 06:27)  - Creatinine: 2.1 (01-07-25 @ 05:52)  Creatinine: 2.2 (01-06-25 @ 06:27)     REVIEW OF SYSTEMS:  CONSTITUTIONAL: No fever or chills  HEENT: No sore throat  RESPIRATORY: No cough, no shortness of breath  CARDIOVASCULAR: No chest pain or palpitations  GASTROINTESTINAL: No abdominal or epigastric pain  GENITOURINARY: No dysuria  NEUROLOGICAL: No headache/dizziness  MSK: No joint pain, erythema, or swelling; no back pain  SKIN: No itching, rashes  All other ROS negative except noted above    Prior hospital charts reviewed [Yes]  Primary team notes reviewed [Yes]  Other consultant notes reviewed [Yes]    ANTIMICROBIALS:   cefepime   IVPB 1000 every 12 hours    MEDICATIONS  (STANDING):  azithromycin  IVPB   255 mL/Hr IV Intermittent (12-30-24 @ 12:35)   255 mL/Hr IV Intermittent (12-29-24 @ 12:19)   255 mL/Hr IV Intermittent (12-28-24 @ 12:42)    azithromycin  IVPB   500 milliGRAM(s) IV Intermittent (12-27-24 @ 12:44)    cefepime   IVPB   100 mL/Hr IV Intermittent (01-07-25 @ 05:42)   100 mL/Hr IV Intermittent (01-06-25 @ 18:40)   100 mL/Hr IV Intermittent (01-06-25 @ 06:15)   100 mL/Hr IV Intermittent (01-05-25 @ 17:27)   100 mL/Hr IV Intermittent (01-05-25 @ 05:01)   100 mL/Hr IV Intermittent (01-04-25 @ 18:43)    cefepime   IVPB   100 mL/Hr IV Intermittent (12-31-24 @ 15:17)    cefepime   IVPB   100 mL/Hr IV Intermittent (01-04-25 @ 05:21)   100 mL/Hr IV Intermittent (01-03-25 @ 17:56)   100 mL/Hr IV Intermittent (01-03-25 @ 05:02)   100 mL/Hr IV Intermittent (01-02-25 @ 17:48)   100 mL/Hr IV Intermittent (01-02-25 @ 05:38)   100 mL/Hr IV Intermittent (01-01-25 @ 18:56)   100 mL/Hr IV Intermittent (01-01-25 @ 05:15)    cefTRIAXone   IVPB   100 mL/Hr IV Intermittent (12-30-24 @ 11:55)   100 mL/Hr IV Intermittent (12-29-24 @ 12:20)   100 mL/Hr IV Intermittent (12-28-24 @ 12:43)   100 mL/Hr IV Intermittent (12-27-24 @ 12:44)    vancomycin  IVPB   166.67 mL/Hr IV Intermittent (12-31-24 @ 17:30)    vancomycin  IVPB   166.67 mL/Hr IV Intermittent (01-01-25 @ 05:14)        OTHER MEDS: MEDICATIONS  (STANDING):  albuterol/ipratropium for Nebulization 3 every 6 hours PRN  aluminum hydroxide/magnesium hydroxide/simethicone Suspension 30 every 4 hours PRN  aMIOdarone    Tablet 200 daily  atorvastatin 40 at bedtime  buMETAnide Injectable 1 daily  clopidogrel Tablet 75 daily  dextrose 50% Injectable 25 once  dextrose 50% Injectable 12.5 once  dextrose 50% Injectable 25 once  dextrose Oral Gel 15 once PRN  ezetimibe 10 daily  fenofibrate Tablet 145 daily  glucagon  Injectable 1 once  heparin   Injectable 6500 every 6 hours PRN  heparin   Injectable 3000 every 6 hours PRN  heparin  Infusion. 900 <Continuous>  hydrOXYzine hydrochloride 25 every 6 hours PRN  insulin glargine Injectable (LANTUS) 10 at bedtime  insulin lispro (ADMELOG) corrective regimen sliding scale  three times a day before meals  melatonin 3 at bedtime PRN  metoprolol tartrate 75 every 6 hours  ondansetron Injectable 4 every 8 hours PRN  polyethylene glycol 3350 17 two times a day  senna 2 at bedtime  zolpidem 5 at bedtime PRN      Vital Signs Last 24 Hrs  T(F): 97.2 (01-07-25 @ 07:01), Max: 104.4 (12-31-24 @ 15:05)    Vital Signs Last 24 Hrs  HR: 97 (01-07-25 @ 07:10) (96 - 106)  BP: 144/80 (01-07-25 @ 07:10) (124/86 - 162/86)  RR: 22 (01-07-25 @ 07:10)  SpO2: 99% (01-07-25 @ 07:10) (91% - 99%)  Wt(kg): --    EXAM:  GENERAL: In NAD, On NC  HEAD: No head lesions  NECK: Supple  CHEST/LUNG: Shallow breath sounds.   HEART: S1 S2  ABDOMEN: Soft, nontender, Distended.   EXTREMITIES: No clubbing, cyanosis, or petal edema  MSK: No joint erythema, swelling or pain  SKIN: No rashes or lesions, no superficial thrombophlebitis    Labs:                        13.8   14.04 )-----------( 316      ( 07 Jan 2025 05:52 )             42.8     01-07    144  |  100  |  50[H]  ----------------------------<  193[H]  3.2[L]   |  33[H]  |  2.1[H]    Ca    9.5      07 Jan 2025 05:52  Phos  2.8     01-06  Mg     2.3     01-06    TPro  5.7[L]  /  Alb  3.3[L]  /  TBili  0.9  /  DBili  x   /  AST  52[H]  /  ALT  168[H]  /  AlkPhos  76  01-07      WBC Trend:  WBC Count: 14.04 (01-07-25 @ 05:52)  WBC Count: 14.00 (01-06-25 @ 06:27)  WBC Count: 15.36 (01-05-25 @ 05:53)  WBC Count: 15.76 (01-04-25 @ 05:58)      Creatine Trend:  Creatinine: 2.1 (01-07)  Creatinine: 2.2 (01-06)  Creatinine: 2.5 (01-05)  Creatinine: 2.2 (01-04)      Liver Biochemical Testing Trend:  Alanine Aminotransferase (ALT/SGPT): 168 *H* (01-07)  Alanine Aminotransferase (ALT/SGPT): 237 *H* (01-06)  Alanine Aminotransferase (ALT/SGPT): 357 *H* (01-05)  Alanine Aminotransferase (ALT/SGPT): 543 *H* (01-04)  Alanine Aminotransferase (ALT/SGPT): 967 *H* (01-03)  Aspartate Aminotransferase (AST/SGOT): 52 (01-07-25 @ 05:52)  Aspartate Aminotransferase (AST/SGOT): 73 (01-06-25 @ 06:27)  Aspartate Aminotransferase (AST/SGOT): 128 (01-05-25 @ 05:53)  Aspartate Aminotransferase (AST/SGOT): 338 (01-04-25 @ 05:58)  Aspartate Aminotransferase (AST/SGOT): 1585 (01-03-25 @ 10:33)  Bilirubin Total: 0.9 (01-07)  Bilirubin Total: 0.9 (01-06)  Bilirubin Total: 0.8 (01-05)  Bilirubin Total: 0.5 (01-04)  Bilirubin Direct: 0.2 (01-03)      Trend LDH      Urinalysis Basic - ( 07 Jan 2025 05:52 )    Color: x / Appearance: x / SG: x / pH: x  Gluc: 193 mg/dL / Ketone: x  / Bili: x / Urobili: x   Blood: x / Protein: x / Nitrite: x   Leuk Esterase: x / RBC: x / WBC x   Sq Epi: x / Non Sq Epi: x / Bacteria: x        MICROBIOLOGY:    Male    Culture - Blood (collected 31 Dec 2024 15:45)  Source: .Blood BLOOD  Final Report:    No growth at 5 days    Culture - Blood (collected 03 Sep 2023 12:50)  Source: .Blood Blood  Final Report:    No growth at 5 days    Culture - Blood (collected 03 Sep 2023 12:50)  Source: .Blood Blood  Final Report:    No growth at 5 days      HIV-1/2 Combo Result: Nonreact (01-04-25 @ 05:58)      Rapid RVP Result: NotDetec (01-02 @ 20:30)    Procalcitonin: 0.68 (01-03)    Troponin T, High Sensitivity Result: 42 (12-31)    Blood Gas Arterial, Lactate: 1.3 (01-04 @ 09:33)        RADIOLOGY & ADDITIONAL TESTS:  I have personally reviewed the relevant images.   CXR      CT    < from: Xray Chest 1 View- PORTABLE-Routine (Xray Chest 1 View- PORTABLE-Routine in AM.) (01.05.25 @ 06:19) >    INTERPRETATION:  CLINICAL HISTORY: Line placement    COMPARISON: January 4, 2025.    TECHNIQUE: Portable frontal chest radiograph.    FINDINGS:    Support devices: Left chest wall AICD    Cardiac/mediastinum/hilum: Stable.    Lung parenchyma/Pleura: Bibasilar opacities. No pneumothorax.    Skeleton/soft tissues: Stable.      IMPRESSION:    Bibasilar opacities.    --- End of Report ---      < end of copied text >    < from: CT Chest No Cont (01.03.25 @ 16:13) >  IMPRESSION:  CHEST:  Bilateral lower lobe small pleural effusions and atelectasis.    Stable right middle lobe 8 mm nodule.    ABDOMEN/PELVIS:  Subacute appearing L2 compression deformity.    Left adrenal nodule measuring 2.1 cm. Nonemergent/outpatient MRI may be   obtained for further characterization    --- End of Report ---    < end of copied text >  < from: US Abdomen Upper Quadrant Right (01.03.25 @ 15:45) >  IMPRESSION:  Limited exam.    No sonographic evidence of acute cholecystitis.    Right pleural effusion.    --- End of Report ---    < end of copied text >    WEIGHT  Weight (kg): 82.1 (12-27-24 @ 16:33)  Creatinine: 2.1 mg/dL (01-07-25 @ 05:52)      All available historical records have been reviewed

## 2025-01-08 LAB
ALBUMIN SERPL ELPH-MCNC: 3 G/DL — LOW (ref 3.5–5.2)
ALP SERPL-CCNC: 64 U/L — SIGNIFICANT CHANGE UP (ref 30–115)
ALT FLD-CCNC: 105 U/L — HIGH (ref 0–41)
ANION GAP SERPL CALC-SCNC: 11 MMOL/L — SIGNIFICANT CHANGE UP (ref 7–14)
ANION GAP SERPL CALC-SCNC: 11 MMOL/L — SIGNIFICANT CHANGE UP (ref 7–14)
APTT BLD: 83 SEC — CRITICAL HIGH (ref 27–39.2)
AST SERPL-CCNC: 41 U/L — SIGNIFICANT CHANGE UP (ref 0–41)
BASOPHILS # BLD AUTO: 0.05 K/UL — SIGNIFICANT CHANGE UP (ref 0–0.2)
BASOPHILS NFR BLD AUTO: 0.4 % — SIGNIFICANT CHANGE UP (ref 0–1)
BILIRUB SERPL-MCNC: 0.9 MG/DL — SIGNIFICANT CHANGE UP (ref 0.2–1.2)
BUN SERPL-MCNC: 36 MG/DL — HIGH (ref 10–20)
BUN SERPL-MCNC: 36 MG/DL — HIGH (ref 10–20)
CALCIUM SERPL-MCNC: 8.9 MG/DL — SIGNIFICANT CHANGE UP (ref 8.4–10.5)
CALCIUM SERPL-MCNC: 9 MG/DL — SIGNIFICANT CHANGE UP (ref 8.4–10.5)
CHLORIDE SERPL-SCNC: 101 MMOL/L — SIGNIFICANT CHANGE UP (ref 98–110)
CHLORIDE SERPL-SCNC: 105 MMOL/L — SIGNIFICANT CHANGE UP (ref 98–110)
CO2 SERPL-SCNC: 29 MMOL/L — SIGNIFICANT CHANGE UP (ref 17–32)
CO2 SERPL-SCNC: 31 MMOL/L — SIGNIFICANT CHANGE UP (ref 17–32)
CREAT SERPL-MCNC: 1.5 MG/DL — SIGNIFICANT CHANGE UP (ref 0.7–1.5)
CREAT SERPL-MCNC: 2 MG/DL — HIGH (ref 0.7–1.5)
EGFR: 35 ML/MIN/1.73M2 — LOW
EGFR: 50 ML/MIN/1.73M2 — LOW
EOSINOPHIL # BLD AUTO: 0.28 K/UL — SIGNIFICANT CHANGE UP (ref 0–0.7)
EOSINOPHIL NFR BLD AUTO: 2.3 % — SIGNIFICANT CHANGE UP (ref 0–8)
GLUCOSE BLDC GLUCOMTR-MCNC: 158 MG/DL — HIGH (ref 70–99)
GLUCOSE SERPL-MCNC: 141 MG/DL — HIGH (ref 70–99)
GLUCOSE SERPL-MCNC: 337 MG/DL — HIGH (ref 70–99)
HCT VFR BLD CALC: 40.3 % — LOW (ref 42–52)
HGB BLD-MCNC: 13 G/DL — LOW (ref 14–18)
IMM GRANULOCYTES NFR BLD AUTO: 1.2 % — HIGH (ref 0.1–0.3)
LYMPHOCYTES # BLD AUTO: 1.71 K/UL — SIGNIFICANT CHANGE UP (ref 1.2–3.4)
LYMPHOCYTES # BLD AUTO: 13.9 % — LOW (ref 20.5–51.1)
MAGNESIUM SERPL-MCNC: 2.1 MG/DL — SIGNIFICANT CHANGE UP (ref 1.8–2.4)
MCHC RBC-ENTMCNC: 28.8 PG — SIGNIFICANT CHANGE UP (ref 27–31)
MCHC RBC-ENTMCNC: 32.3 G/DL — SIGNIFICANT CHANGE UP (ref 32–37)
MCV RBC AUTO: 89.4 FL — SIGNIFICANT CHANGE UP (ref 80–94)
MONOCYTES # BLD AUTO: 0.99 K/UL — HIGH (ref 0.1–0.6)
MONOCYTES NFR BLD AUTO: 8.1 % — SIGNIFICANT CHANGE UP (ref 1.7–9.3)
NEUTROPHILS # BLD AUTO: 9.11 K/UL — HIGH (ref 1.4–6.5)
NEUTROPHILS NFR BLD AUTO: 74.1 % — SIGNIFICANT CHANGE UP (ref 42.2–75.2)
NRBC # BLD: 0 /100 WBCS — SIGNIFICANT CHANGE UP (ref 0–0)
PHOSPHATE SERPL-MCNC: 1.8 MG/DL — LOW (ref 2.1–4.9)
PLATELET # BLD AUTO: 271 K/UL — SIGNIFICANT CHANGE UP (ref 130–400)
PMV BLD: 11.7 FL — HIGH (ref 7.4–10.4)
POTASSIUM SERPL-MCNC: 3.2 MMOL/L — LOW (ref 3.5–5)
POTASSIUM SERPL-MCNC: 4.7 MMOL/L — SIGNIFICANT CHANGE UP (ref 3.5–5)
POTASSIUM SERPL-SCNC: 3.2 MMOL/L — LOW (ref 3.5–5)
POTASSIUM SERPL-SCNC: 4.7 MMOL/L — SIGNIFICANT CHANGE UP (ref 3.5–5)
PROT SERPL-MCNC: 5.5 G/DL — LOW (ref 6–8)
RBC # BLD: 4.51 M/UL — LOW (ref 4.7–6.1)
RBC # FLD: 13.1 % — SIGNIFICANT CHANGE UP (ref 11.5–14.5)
SODIUM SERPL-SCNC: 141 MMOL/L — SIGNIFICANT CHANGE UP (ref 135–146)
SODIUM SERPL-SCNC: 147 MMOL/L — HIGH (ref 135–146)
WBC # BLD: 12.29 K/UL — HIGH (ref 4.8–10.8)
WBC # FLD AUTO: 12.29 K/UL — HIGH (ref 4.8–10.8)

## 2025-01-08 PROCEDURE — 99291 CRITICAL CARE FIRST HOUR: CPT

## 2025-01-08 PROCEDURE — 99233 SBSQ HOSP IP/OBS HIGH 50: CPT

## 2025-01-08 PROCEDURE — 93010 ELECTROCARDIOGRAM REPORT: CPT

## 2025-01-08 PROCEDURE — 71045 X-RAY EXAM CHEST 1 VIEW: CPT | Mod: 26

## 2025-01-08 RX ORDER — POTASSIUM CHLORIDE 600 MG/1
40 TABLET, FILM COATED, EXTENDED RELEASE ORAL ONCE
Refills: 0 | Status: COMPLETED | OUTPATIENT
Start: 2025-01-08 | End: 2025-01-08

## 2025-01-08 RX ORDER — SOD PHOS DI, MONO/K PHOS MONO 250 MG
1 TABLET ORAL
Refills: 0 | Status: COMPLETED | OUTPATIENT
Start: 2025-01-08 | End: 2025-01-10

## 2025-01-08 RX ORDER — INSULIN LISPRO 100/ML
3 VIAL (ML) SUBCUTANEOUS ONCE
Refills: 0 | Status: COMPLETED | OUTPATIENT
Start: 2025-01-08 | End: 2025-01-08

## 2025-01-08 RX ORDER — POTASSIUM CHLORIDE 600 MG/1
20 TABLET, FILM COATED, EXTENDED RELEASE ORAL
Refills: 0 | Status: COMPLETED | OUTPATIENT
Start: 2025-01-08 | End: 2025-01-08

## 2025-01-08 RX ORDER — TRAMADOL HYDROCHLORIDE 50 MG/1
50 TABLET ORAL ONCE
Refills: 0 | Status: DISCONTINUED | OUTPATIENT
Start: 2025-01-08 | End: 2025-01-08

## 2025-01-08 RX ORDER — SOD PHOS DI, MONO/K PHOS MONO 250 MG
1 TABLET ORAL
Refills: 0 | Status: DISCONTINUED | OUTPATIENT
Start: 2025-01-08 | End: 2025-01-08

## 2025-01-08 RX ADMIN — CHLORHEXIDINE GLUCONATE 1 APPLICATION(S): 1.2 RINSE ORAL at 05:59

## 2025-01-08 RX ADMIN — CLOPIDOGREL BISULFATE 75 MILLIGRAM(S): 75 TABLET, FILM COATED ORAL at 11:57

## 2025-01-08 RX ADMIN — ATORVASTATIN CALCIUM 40 MILLIGRAM(S): 40 TABLET, FILM COATED ORAL at 21:23

## 2025-01-08 RX ADMIN — BUMETANIDE 1 MILLIGRAM(S): 2 TABLET ORAL at 05:58

## 2025-01-08 RX ADMIN — Medication 3 UNIT(S): at 22:41

## 2025-01-08 RX ADMIN — POTASSIUM CHLORIDE 40 MILLIEQUIVALENT(S): 600 TABLET, FILM COATED, EXTENDED RELEASE ORAL at 16:28

## 2025-01-08 RX ADMIN — Medication 1 PACKET(S): at 16:29

## 2025-01-08 RX ADMIN — POTASSIUM CHLORIDE 50 MILLIEQUIVALENT(S): 600 TABLET, FILM COATED, EXTENDED RELEASE ORAL at 20:38

## 2025-01-08 RX ADMIN — FENOFIBRATE 145 MILLIGRAM(S): 48 TABLET ORAL at 11:58

## 2025-01-08 RX ADMIN — HEPARIN SODIUM 1300 UNIT(S)/HR: 1000 INJECTION, SOLUTION INTRAVENOUS; SUBCUTANEOUS at 19:34

## 2025-01-08 RX ADMIN — TRAMADOL HYDROCHLORIDE 50 MILLIGRAM(S): 50 TABLET ORAL at 21:55

## 2025-01-08 RX ADMIN — TRAMADOL HYDROCHLORIDE 50 MILLIGRAM(S): 50 TABLET ORAL at 21:23

## 2025-01-08 RX ADMIN — HEPARIN SODIUM 1300 UNIT(S)/HR: 1000 INJECTION, SOLUTION INTRAVENOUS; SUBCUTANEOUS at 08:19

## 2025-01-08 RX ADMIN — HEPARIN SODIUM 1300 UNIT(S)/HR: 1000 INJECTION, SOLUTION INTRAVENOUS; SUBCUTANEOUS at 15:28

## 2025-01-08 RX ADMIN — Medication 75 MILLIGRAM(S): at 17:11

## 2025-01-08 RX ADMIN — POTASSIUM CHLORIDE 50 MILLIEQUIVALENT(S): 600 TABLET, FILM COATED, EXTENDED RELEASE ORAL at 17:17

## 2025-01-08 RX ADMIN — Medication 3: at 16:29

## 2025-01-08 RX ADMIN — Medication 100 MILLIGRAM(S): at 06:02

## 2025-01-08 RX ADMIN — Medication 2: at 11:57

## 2025-01-08 RX ADMIN — POTASSIUM CHLORIDE 40 MILLIEQUIVALENT(S): 600 TABLET, FILM COATED, EXTENDED RELEASE ORAL at 08:19

## 2025-01-08 RX ADMIN — AMIODARONE HYDROCHLORIDE 200 MILLIGRAM(S): 200 TABLET ORAL at 05:58

## 2025-01-08 RX ADMIN — INSULIN GLARGINE-YFGN 10 UNIT(S): 100 INJECTION, SOLUTION SUBCUTANEOUS at 22:40

## 2025-01-08 RX ADMIN — Medication 1: at 08:18

## 2025-01-08 RX ADMIN — EZETIMIBE 10 MILLIGRAM(S): 10 TABLET ORAL at 11:58

## 2025-01-08 NOTE — OCCUPATIONAL THERAPY INITIAL EVALUATION ADULT - LIVES WITH, PROFILE
wife and son in private home +9 steps to enter with (L) handrail + one flight of stairs with (L) handrail going up to bedroom/bathroom +tub/children/spouse

## 2025-01-08 NOTE — PROGRESS NOTE ADULT - SUBJECTIVE AND OBJECTIVE BOX
Nephrology Progress Note    MONIQUE LYONS  [id number]  69y  Male    S:  Patient is seen and examined, events over the last 24h noted.    O:  Allergies:  sulfa drugs (Unknown)    Hospital Medications:   MEDICATIONS  (STANDING):  aMIOdarone    Tablet 200 milliGRAM(s) Oral daily  atorvastatin 40 milliGRAM(s) Oral at bedtime  chlorhexidine 2% Cloths 1 Application(s) Topical <User Schedule>  clopidogrel Tablet 75 milliGRAM(s) Oral daily  dexMEDEtomidine Infusion 0.05 MICROgram(s)/kG/Hr (1.03 mL/Hr) IV Continuous <Continuous>  ezetimibe 10 milliGRAM(s) Oral daily  fenofibrate Tablet 145 milliGRAM(s) Oral daily  heparin  Infusion. 900 Unit(s)/Hr (9 mL/Hr) IV Continuous <Continuous>  insulin glargine Injectable (LANTUS) 10 Unit(s) SubCutaneous at bedtime  insulin lispro (ADMELOG) corrective regimen sliding scale   SubCutaneous three times a day before meals  metoprolol tartrate 75 milliGRAM(s) Oral every 6 hours  norepinephrine Infusion 0.05 MICROgram(s)/kG/Min (7.7 mL/Hr) IV Continuous <Continuous>  polyethylene glycol 3350 17 Gram(s) Oral two times a day  senna 2 Tablet(s) Oral at bedtime    MEDICATIONS  (PRN):  albuterol/ipratropium for Nebulization 3 milliLiter(s) Nebulizer every 6 hours PRN Shortness of Breath and/or Wheezing  aluminum hydroxide/magnesium hydroxide/simethicone Suspension 30 milliLiter(s) Oral every 4 hours PRN Dyspepsia  dextrose Oral Gel 15 Gram(s) Oral once PRN Blood Glucose LESS THAN 70 milliGRAM(s)/deciliter  heparin   Injectable 6500 Unit(s) IV Push every 6 hours PRN For aPTT less than 40  heparin   Injectable 3000 Unit(s) IV Push every 6 hours PRN For aPTT between 40 - 57  melatonin 3 milliGRAM(s) Oral at bedtime PRN Insomnia  ondansetron Injectable 4 milliGRAM(s) IV Push every 8 hours PRN Nausea and/or Vomiting  zolpidem 5 milliGRAM(s) Oral at bedtime PRN Insomnia    Home Medications:  amiodarone 200 mg oral tablet: 1 tab(s) orally once a day (27 Dec 2024 11:06)  apixaban 5 mg oral tablet: 1 tab(s) orally every 12 hours (26 Dec 2024 15:14)  atorvastatin 40 mg oral tablet: 1 tab(s) orally once a day (27 Dec 2024 11:11)  clopidogrel 75 mg oral tablet: 1 tab(s) orally once a day (26 Dec 2024 15:14)  Entresto 24 mg-26 mg oral tablet: 1 tab(s) orally 2 times a day (26 Dec 2024 15:14)  eszopiclone 3 mg oral tablet: 1 tab(s) orally once a day (at bedtime) (26 Dec 2024 15:14)  ezetimibe 10 mg oral tablet: 1 orally once a day (26 Dec 2024 15:14)  fenofibrate 145 mg oral tablet: 1 orally once a day (26 Dec 2024 15:14)  furosemide 40 mg oral tablet: 1 orally once a day (26 Dec 2024 15:14)  metFORMIN 500 mg oral tablet: 1 tab(s) orally 2 times a day (26 Dec 2024 15:14)  metoprolol succinate 200 mg oral capsule, extended release: 1 cap(s) orally once a day (27 Dec 2024 11:07)  Trulicity Pen 1.5 mg/0.5 mL subcutaneous solution: 1.5 milligram(s) subcutaneously once a week (26 Dec 2024 15:14)      VITALS:  Daily     Daily Weight in k.3 (2025 07:01)  T(F): 98 (25 @ 07:01), Max: 98 (25 @ 07:01)  HR: 101 (25 @ 11:00)  BP: 88/57 (25 @ 11:00)  RR: 34 (25 @ 11:00)  SpO2: 99% (25 @ 11:00)  Wt(kg): --  I&O's Detail    2025 07:01  -  2025 07:00  --------------------------------------------------------  IN:    Dexmedetomidine: 106.6 mL    Heparin Infusion: 298 mL    IV PiggyBack: 150 mL    IV PiggyBack: 50 mL    Norepinephrine: 28 mL  Total IN: 632.6 mL    OUT:    Indwelling Catheter - Urethral (mL): 2015 mL    Rectal Tube (mL): 0 mL  Total OUT:  mL    Total NET: -1382.4 mL      2025 07:  -  2025 12:40  --------------------------------------------------------  IN:    Heparin Infusion: 91 mL    Oral Fluid: 340 mL  Total IN: 431 mL    OUT:    Indwelling Catheter - Urethral (mL): 400 mL  Total OUT: 400 mL    Total NET: 31 mL        I&O's Summary    2025 07:  -  2025 07:00  --------------------------------------------------------  IN: 632.6 mL / OUT:  mL / NET: -1382.4 mL    2025 07:  -  2025 12:40  --------------------------------------------------------  IN: 431 mL / OUT: 400 mL / NET: 31 mL          PHYSICAL EXAM:  Gen: NAD  Chest: b/l breath sounds  Abd: soft  Extremities: no edema      LABS:          147[H]  |  105  |  36[H]  ----------------------------<  141[H]  3.2[L]   |  31  |  1.5    Ca    8.9      2025 06:22  Phos  1.8       Mg     2.1         TPro  5.5[L]  /  Alb  3.0[L]  /  TBili  0.9  /  DBili      /  AST  41  /  ALT  105[H]  /  AlkPhos  64      Phosphorus: 1.8 mg/dL (25 @ 06:22)  Phosphorus: 2.8 mg/dL (25 @ 06:27)                            13.0   12.29 )-----------( 271      ( 2025 06:22 )             40.3     Mean Cell Volume: 89.4 fL (25 @ 06:22)          Urine Studies:      Creatinine trend:  Creatinine: 1.5 mg/dL (25 @ 06:22)  Creatinine: 1.9 mg/dL (25 @ 19:15)  Creatinine: 2.1 mg/dL (25 @ 05:52)  Creatinine: 2.2 mg/dL (25 @ 06:27)  Creatinine: 2.5 mg/dL (25 @ 05:53)

## 2025-01-08 NOTE — PROGRESS NOTE ADULT - CRITICAL CARE ATTENDING COMMENT
This patient is critically ill due to the following:  * Multiple organ failure requiring complex decision-making, and there is a high probability of imminent or life-threatening deterioration in the patient’s condition  * The patient required frequent reassessments and monitoring to ensure response to interventions and therapies.    Critical care time includes time spent evaluating and treating the patient's acute illness as well as time spent reviewing labs, radiology,  and discussing the case with a multidisciplinary team in an effort to prevent further life threatening deterioration or end organ damage. This time is independent of any procedures performed.
This patient is critically ill due to the following:  * Multiple organ failure requiring complex decision-making, and there is a high probability of imminent or life-threatening deterioration in the patient’s condition  * The patient required frequent reassessments and monitoring to ensure response to interventions and therapies.    Critical care time includes time spent evaluating and treating the patient's acute illness as well as time spent reviewing labs, radiology,  and discussing the case with a multidisciplinary team in an effort to prevent further life threatening deterioration or end organ damage. This time is independent of any procedures performed.
Attending comments:  -I have personally seen and examined this patient.  I have reviewed all pertinent clinical information and reviewed all relevant imaging and diagnostic studies personally.   -The patient's injury acutely impairs one or more vital organ systems, and there is a high probability of imminent or life threatening deterioration in the patient's condition. The care of this patient requires complex medical decision making in the field of Infectious Diseases and extensive interpretation of laboratory, microbiological and radiographic data.
This patient is critically ill due to the following:  * Respiratory instability requiring management of invasive ventilation  * Multiple organ failure requiring complex decision-making, and there is a high probability of imminent or life-threatening deterioration in the patient’s condition  * The patient required frequent reassessments and monitoring to ensure response to interventions and therapies.    Critical care time includes time spent evaluating and treating the patient's acute illness as well as time spent reviewing labs, radiology,  and discussing the case with a multidisciplinary team in an effort to prevent further life threatening deterioration or end organ damage. This time is independent of any procedures performed.
This patient is critically ill due to the following:  * Multiple organ failure requiring complex decision-making, and there is a high probability of imminent or life-threatening deterioration in the patient’s condition  * The patient required frequent reassessments and monitoring to ensure response to interventions and therapies.    Critical care time includes time spent evaluating and treating the patient's acute illness as well as time spent reviewing labs, radiology,  and discussing the case with a multidisciplinary team in an effort to prevent further life threatening deterioration or end organ damage. This time is independent of any procedures performed.
This patient is critically ill due to the following:  * Multiple organ failure requiring complex decision-making, and there is a high probability of imminent or life-threatening deterioration in the patient’s condition  * The patient required frequent reassessments and monitoring to ensure response to interventions and therapies.    Critical care time includes time spent evaluating and treating the patient's acute illness as well as time spent reviewing labs, radiology,  and discussing the case with a multidisciplinary team in an effort to prevent further life threatening deterioration or end organ damage. This time is independent of any procedures performed.
Attending comments:  -I have personally seen and examined this patient.  I have reviewed all pertinent clinical information and reviewed all relevant imaging and diagnostic studies personally.   -The patient's injury acutely impairs one or more vital organ systems, and there is a high probability of imminent or life threatening deterioration in the patient's condition. The care of this patient requires complex medical decision making in the field of Infectious Diseases and extensive interpretation of laboratory, microbiological and radiographic data.
Attending comments:  -I have personally seen and examined this patient.  I have reviewed all pertinent clinical information and reviewed all relevant imaging and diagnostic studies personally.   -The patient's injury acutely impairs one or more vital organ systems, and there is a high probability of imminent or life threatening deterioration in the patient's condition. The care of this patient requires complex medical decision making in the field of Infectious Diseases and extensive interpretation of laboratory, microbiological and radiographic data.
This patient is critically ill due to the following:  * Multiple organ failure requiring complex decision-making, and there is a high probability of imminent or life-threatening deterioration in the patient’s condition  * The patient required frequent reassessments and monitoring to ensure response to interventions and therapies.    Critical care time includes time spent evaluating and treating the patient's acute illness as well as time spent reviewing labs, radiology,  and discussing the case with a multidisciplinary team in an effort to prevent further life threatening deterioration or end organ damage. This time is independent of any procedures performed.

## 2025-01-08 NOTE — PROGRESS NOTE ADULT - SUBJECTIVE AND OBJECTIVE BOX
Patient is a 69y old  Male who presents with a chief complaint of CHF exacerbation (07 Jan 2025 18:10)      Over Night Events:  Patient seen and examined.   s/p ct brain no acute changes   more clam today   on precedex 0.2   ROS:  See HPI    PHYSICAL EXAM    ICU Vital Signs Last 24 Hrs  T(C): 36.7 (08 Jan 2025 07:01), Max: 36.7 (08 Jan 2025 07:01)  T(F): 98 (08 Jan 2025 07:01), Max: 98 (08 Jan 2025 07:01)  HR: 82 (08 Jan 2025 06:00) (77 - 114)  BP: 130/60 (08 Jan 2025 06:00) (77/47 - 159/79)  BP(mean): 86 (08 Jan 2025 06:00) (57 - 108)  ABP: --  ABP(mean): --  RR: 20 (08 Jan 2025 07:01) (12 - 35)  SpO2: 100% (08 Jan 2025 06:00) (90% - 100%)    O2 Parameters below as of 08 Jan 2025 04:00  Patient On (Oxygen Delivery Method): nasal cannula  O2 Flow (L/min): 2          General: awake   HEENT:             teodoro   Lymph Nodes: NO cervical LN   Lungs: Bilateral BS  Cardiovascular: Regular   Abdomen: Soft, Positive BS  Extremities: No clubbing   Skin: warm   Neurological:   no focal   Musculoskeletal: move all ext     I&O's Detail    07 Jan 2025 07:01  -  08 Jan 2025 07:00  --------------------------------------------------------  IN:    Dexmedetomidine: 106.6 mL    Heparin Infusion: 298 mL    IV PiggyBack: 150 mL    IV PiggyBack: 50 mL    Norepinephrine: 28 mL  Total IN: 632.6 mL    OUT:    Indwelling Catheter - Urethral (mL): 2015 mL    Rectal Tube (mL): 0 mL  Total OUT: 2015 mL    Total NET: -1382.4 mL      08 Jan 2025 07:01  -  08 Jan 2025 07:54  --------------------------------------------------------  IN:  Total IN: 0 mL    OUT:    Indwelling Catheter - Urethral (mL): 150 mL  Total OUT: 150 mL    Total NET: -150 mL          LABS:                          13.0   12.29 )-----------( 271      ( 08 Jan 2025 06:22 )             40.3         08 Jan 2025 06:22    147    |  105    |  36     ----------------------------<  141    3.2     |  31     |  1.5      Ca    8.9        08 Jan 2025 06:22  Phos  1.8       08 Jan 2025 06:22  Mg     2.1       08 Jan 2025 06:22    TPro  5.5    /  Alb  3.0    /  TBili  0.9    /  DBili  x      /  AST  41     /  ALT  105    /  AlkPhos  64     08 Jan 2025 06:22  Amylase x     lipase x                                                 PTT - ( 07 Jan 2025 20:42 )  PTT:92.4 sec                                       Urinalysis Basic - ( 08 Jan 2025 06:22 )    Color: x / Appearance: x / SG: x / pH: x  Gluc: 141 mg/dL / Ketone: x  / Bili: x / Urobili: x   Blood: x / Protein: x / Nitrite: x   Leuk Esterase: x / RBC: x / WBC x   Sq Epi: x / Non Sq Epi: x / Bacteria: x                                                                                                                                                     MEDICATIONS  (STANDING):  aMIOdarone    Tablet 200 milliGRAM(s) Oral daily  atorvastatin 40 milliGRAM(s) Oral at bedtime  buMETAnide Injectable 1 milliGRAM(s) IV Push daily  cefepime   IVPB 1000 milliGRAM(s) IV Intermittent every 12 hours  chlorhexidine 2% Cloths 1 Application(s) Topical <User Schedule>  clopidogrel Tablet 75 milliGRAM(s) Oral daily  dexMEDEtomidine Infusion 0.05 MICROgram(s)/kG/Hr (1.03 mL/Hr) IV Continuous <Continuous>  dextrose 5%. 1000 milliLiter(s) (50 mL/Hr) IV Continuous <Continuous>  dextrose 50% Injectable 25 Gram(s) IV Push once  dextrose 50% Injectable 12.5 Gram(s) IV Push once  dextrose 50% Injectable 25 Gram(s) IV Push once  ezetimibe 10 milliGRAM(s) Oral daily  fenofibrate Tablet 145 milliGRAM(s) Oral daily  glucagon  Injectable 1 milliGRAM(s) IntraMuscular once  heparin  Infusion. 900 Unit(s)/Hr (9 mL/Hr) IV Continuous <Continuous>  insulin glargine Injectable (LANTUS) 10 Unit(s) SubCutaneous at bedtime  insulin lispro (ADMELOG) corrective regimen sliding scale   SubCutaneous three times a day before meals  metoprolol tartrate 75 milliGRAM(s) Oral every 6 hours  norepinephrine Infusion 0.05 MICROgram(s)/kG/Min (7.7 mL/Hr) IV Continuous <Continuous>  polyethylene glycol 3350 17 Gram(s) Oral two times a day  senna 2 Tablet(s) Oral at bedtime    MEDICATIONS  (PRN):  albuterol/ipratropium for Nebulization 3 milliLiter(s) Nebulizer every 6 hours PRN Shortness of Breath and/or Wheezing  aluminum hydroxide/magnesium hydroxide/simethicone Suspension 30 milliLiter(s) Oral every 4 hours PRN Dyspepsia  dextrose Oral Gel 15 Gram(s) Oral once PRN Blood Glucose LESS THAN 70 milliGRAM(s)/deciliter  heparin   Injectable 6500 Unit(s) IV Push every 6 hours PRN For aPTT less than 40  heparin   Injectable 3000 Unit(s) IV Push every 6 hours PRN For aPTT between 40 - 57  melatonin 3 milliGRAM(s) Oral at bedtime PRN Insomnia  ondansetron Injectable 4 milliGRAM(s) IV Push every 8 hours PRN Nausea and/or Vomiting  zolpidem 5 milliGRAM(s) Oral at bedtime PRN Insomnia          Xrays:  stable                                                                                  ECHO:  [facility] ICU:

## 2025-01-08 NOTE — OCCUPATIONAL THERAPY INITIAL EVALUATION ADULT - GENERAL OBSERVATIONS, REHAB EVAL
18:50-19:20. Chart reviewed, ok to treat by Occupational Therapist as confirmed by RN Selma, Pt received in semi smith's position in bed +heplock +tele +pulse ox +BP cuff +iyer +2L 02 via NC in NAD.

## 2025-01-08 NOTE — CHART NOTE - NSCHARTNOTEFT_GEN_A_CORE
Please transfer patient to [facility] 4T for AF Ablation next week with Dr Deandre Olguin tele monitoring  Will follow patient    Call EP with questions [phone]

## 2025-01-08 NOTE — PROGRESS NOTE ADULT - ASSESSMENT
IMPRESSION:    Acute Hypoxemic Respiratory Failure   Pulmonary Edema  Acute Decompensated HFmrEF   Toxic Metabolic Encephalopathy   Persistent AFIB awaiting Albation   KAREN -   Transaminitis improving   HO ICM s/p CRT-D  HO Bioprosthetic AV  HO CVA  HO DM   HO COPD     PLAN:    CNS:  no sedation   ct brain   d/c  precedex for agitation   ammonia level reviewed   neuro  HEENT: Oral care      PULMONARY:  HOB @ 45 degrees.  keep pox >92%   refusing cxr   CARDIOVASCULAR: Volume contraction as tolerated, optimize cardiac therapy,   hold  bumex   keep is < os    Rate control, Cardiology follow up, TTE reviewed, proBNP noted. Wean levophed as tolerated, target SpO2 92-96%.    EP note reviewed   D5 w 50cc/ hr for hypernatremia   GI: GI prophylaxis. If no extubation, OG feeds. Bowel regimen. Trend LFTs  improving   . RUQ US. DC statin. GI evaluation.    hold stool softner   RENAL:  Follow up lytes.  Correct as needed.  Consult nephrology. Monitor UO.   follow BUn, cr    replace K   hold bumex   replace K keep around 4  give 20 jose Q12 hrs   BMP after noon   iyer replace for retention   INFECTIOUS DISEASE: Follow up cultures, ABX per ID: s/p cefepime   HEMATOLOGICAL: Full AC. Monitor CBC and coags. target PTT around 70     ENDOCRINE:  Follow up FS.  Insulin protocol if needed.    MUSCULOSKELETAL: bedrest for now    Goals of care    keep SDU for now

## 2025-01-08 NOTE — SWALLOW BEDSIDE ASSESSMENT ADULT - SLP PERTINENT HISTORY OF CURRENT PROBLEM
Fax request from [facility] pharmacy for refill of Microgestin 1/20 FE, written as \"second request\". Original fax request available if needed.    This is a 69-year-old male with past medical history of chronic HFrEF s/p ICD, AVR porcine, HTN, PAF s/p ablation, CAD s/p PCI, DM, HLD, COPD (no home O2), smoker presenting with worsening shortness of breath and weakness.  Patient was admitted within the last 24 hours for acute on chronic CHF exacerbation, was given IV Lasix and BiPAP, and left AMA.  Patient returns stating that he became more short of breath last night after leaving the hospital and would like to be readmitted for treatment.  Denies fever/chills, chest pain, abdominal pain, calf pain, N/V/D/C, cough, and nasal congestion.

## 2025-01-08 NOTE — PROGRESS NOTE ADULT - SUBJECTIVE AND OBJECTIVE BOX
MONIQUE LYONS 69y Male  MRN#: [id number]     Hospital Day: 12d      SUBJECTIVE  No acute events overnight, pt seen and examined this morning.                                          ----------------------------------------------------------  OBJECTIVE  PAST MEDICAL & SURGICAL HISTORY  CHF (congestive heart failure)    Diabetes    CAD (coronary artery disease)    Chronic obstructive pulmonary disease (COPD)    HTN (hypertension)    Stented coronary artery    Dyslipidemia    GERD (gastroesophageal reflux disease)    Afib    CVA (cerebral vascular accident)    H/O heart artery stent    Heart valve replaced  aortic    History of Nissen fundoplication                                              -----------------------------------------------------------  ALLERGIES:  sulfa drugs (Unknown)                                            ------------------------------------------------------------    HOME MEDICATIONS  Home Medications:  amiodarone 200 mg oral tablet: 1 tab(s) orally once a day (27 Dec 2024 11:06)  apixaban 5 mg oral tablet: 1 tab(s) orally every 12 hours (26 Dec 2024 15:14)  atorvastatin 40 mg oral tablet: 1 tab(s) orally once a day (27 Dec 2024 11:11)  clopidogrel 75 mg oral tablet: 1 tab(s) orally once a day (26 Dec 2024 15:14)  Entresto 24 mg-26 mg oral tablet: 1 tab(s) orally 2 times a day (26 Dec 2024 15:14)  eszopiclone 3 mg oral tablet: 1 tab(s) orally once a day (at bedtime) (26 Dec 2024 15:14)  ezetimibe 10 mg oral tablet: 1 orally once a day (26 Dec 2024 15:14)  fenofibrate 145 mg oral tablet: 1 orally once a day (26 Dec 2024 15:14)  furosemide 40 mg oral tablet: 1 orally once a day (26 Dec 2024 15:14)  metFORMIN 500 mg oral tablet: 1 tab(s) orally 2 times a day (26 Dec 2024 15:14)  metoprolol succinate 200 mg oral capsule, extended release: 1 cap(s) orally once a day (27 Dec 2024 11:07)  Trulicity Pen 1.5 mg/0.5 mL subcutaneous solution: 1.5 milligram(s) subcutaneously once a week (26 Dec 2024 15:14)                           MEDICATIONS:  STANDING MEDICATIONS  aMIOdarone    Tablet 200 milliGRAM(s) Oral daily  atorvastatin 40 milliGRAM(s) Oral at bedtime  chlorhexidine 2% Cloths 1 Application(s) Topical <User Schedule>  clopidogrel Tablet 75 milliGRAM(s) Oral daily  dexMEDEtomidine Infusion 0.05 MICROgram(s)/kG/Hr IV Continuous <Continuous>  dextrose 5%. 1000 milliLiter(s) IV Continuous <Continuous>  dextrose 50% Injectable 25 Gram(s) IV Push once  dextrose 50% Injectable 12.5 Gram(s) IV Push once  dextrose 50% Injectable 25 Gram(s) IV Push once  ezetimibe 10 milliGRAM(s) Oral daily  fenofibrate Tablet 145 milliGRAM(s) Oral daily  glucagon  Injectable 1 milliGRAM(s) IntraMuscular once  heparin  Infusion. 900 Unit(s)/Hr IV Continuous <Continuous>  insulin glargine Injectable (LANTUS) 10 Unit(s) SubCutaneous at bedtime  insulin lispro (ADMELOG) corrective regimen sliding scale   SubCutaneous three times a day before meals  metoprolol tartrate 75 milliGRAM(s) Oral every 6 hours  norepinephrine Infusion 0.05 MICROgram(s)/kG/Min IV Continuous <Continuous>  polyethylene glycol 3350 17 Gram(s) Oral two times a day  senna 2 Tablet(s) Oral at bedtime    PRN MEDICATIONS  albuterol/ipratropium for Nebulization 3 milliLiter(s) Nebulizer every 6 hours PRN  aluminum hydroxide/magnesium hydroxide/simethicone Suspension 30 milliLiter(s) Oral every 4 hours PRN  dextrose Oral Gel 15 Gram(s) Oral once PRN  heparin   Injectable 6500 Unit(s) IV Push every 6 hours PRN  heparin   Injectable 3000 Unit(s) IV Push every 6 hours PRN  melatonin 3 milliGRAM(s) Oral at bedtime PRN  ondansetron Injectable 4 milliGRAM(s) IV Push every 8 hours PRN  zolpidem 5 milliGRAM(s) Oral at bedtime PRN                                            ------------------------------------------------------------  VITAL SIGNS: Last 24 Hours  T(C): 36.7 (08 Jan 2025 07:01), Max: 36.7 (08 Jan 2025 07:01)  T(F): 98 (08 Jan 2025 07:01), Max: 98 (08 Jan 2025 07:01)  HR: 101 (08 Jan 2025 11:00) (77 - 106)  BP: 88/57 (08 Jan 2025 11:00) (77/47 - 157/64)  BP(mean): 69 (08 Jan 2025 11:00) (57 - 101)  RR: 34 (08 Jan 2025 11:00) (12 - 35)  SpO2: 99% (08 Jan 2025 11:00) (90% - 100%)      01-07-25 @ 07:01  -  01-08-25 @ 07:00  --------------------------------------------------------  IN: 632.6 mL / OUT: 2015 mL / NET: -1382.4 mL    01-08-25 @ 07:01  -  01-08-25 @ 12:44  --------------------------------------------------------  IN: 431 mL / OUT: 400 mL / NET: 31 mL                                             --------------------------------------------------------------  LABS:                        13.0   12.29 )-----------( 271      ( 08 Jan 2025 06:22 )             40.3     01-08    147[H]  |  105  |  36[H]  ----------------------------<  141[H]  3.2[L]   |  31  |  1.5    Ca    8.9      08 Jan 2025 06:22  Phos  1.8     01-08  Mg     2.1     01-08    TPro  5.5[L]  /  Alb  3.0[L]  /  TBili  0.9  /  DBili  x   /  AST  41  /  ALT  105[H]  /  AlkPhos  64  01-08    PTT - ( 08 Jan 2025 06:22 )  PTT:83.0 sec  Urinalysis Basic - ( 08 Jan 2025 06:22 )    Color: x / Appearance: x / SG: x / pH: x  Gluc: 141 mg/dL / Ketone: x  / Bili: x / Urobili: x   Blood: x / Protein: x / Nitrite: x   Leuk Esterase: x / RBC: x / WBC x   Sq Epi: x / Non Sq Epi: x / Bacteria: x                                                            -------------------------------------------------------------  RADIOLOGY:  CXR      CT                                            --------------------------------------------------------------    PHYSICAL EXAM:  GENERAL: NAD, lying in bed comfortably  CHEST/LUNG: course sounds, come crackles   HEART: Regular rate and rhythm; No murmurs, rubs, or gallops  ABDOMEN: Soft, nontender, nondistended  EXTREMITIES:  No clubbing, cyanosis, or edema  NERVOUS SYSTEM:  A&Ox2-3

## 2025-01-08 NOTE — PROGRESS NOTE ADULT - ASSESSMENT
69-year-old male with past medical history of chronic HFrEF s/p ICD, AVR porcine, HTN, PAF s/p ablation, CAD s/p PCI, DM, HLD, COPD (no home O2), smoker presented 12/26  with worsening shortness of breath and weakness.   presented w/ Cr 1.5 ( looking back has some CKD (eGFR < 60 since at least October 2024)     KAREN d/t hypotension, resolved  Did not receive IV contrast, was on Levophed so likely has ATN  No urine studies available, iyer had gross hematuria (? from placement he denies hematuria prior to iyer)   No hydro on Rt kidney on Abd US 1/3     Suggest  - bumex as needed to maintain euvolemia  - encourage po hydration to avoid hypernatremia  - seems overall nutritionally deficient; replete phos to 2.5, K to 4, Mg to 2 as needed  - bicarb level down-trending  - off levophed, hypotensive again, may need to resume levophed;  can start midodrine 10mg q8h  - obtain renal/bladder ultrasound if creat is up-trending again    Nephrology signing off.  Recall as needed.

## 2025-01-08 NOTE — PROGRESS NOTE ADULT - ASSESSMENT
69-year-old male with past medical history of chronic HFrEF s/p ICD, AVR porcine, HTN, PAF s/p ablation, CAD s/p PCI, DM, HLD, COPD (no home O2), smoker presenting with worsening shortness of breath and weakness with concern for acute on chronic CHF exacerbation in the setting of persistent afib    Acute Hypoxemic Respiratory Failure due to pulmonary edema/acute on chronic HFmrEF   toxic metabolic encephalopathy  Chronic afib   KAREN   Transaminitis, congestive hepatopathy   HO ICM s/p CRT-D  HO Bioprosthetic AV  HO CVA  HO DM   HO COPD     - CT head negative  - complete course of Cefepime, end date per ID 1/7, dc cefipime finished course   - CXR improved  -wean O2, get accurate pule ox, lots of artifact and movement affecting read   -hold bumex per CC, would hold off on starting d5w and encourage po hydration for now and monitor Na   -wean off precedex (for agitation), would need EP input before starting  anti-psychotic as has prolonged qtc (580), though has CRT-D, avoid qt prolonging agents for now until EP input    - supplement hypokalemia, phos   -EP noted, ablation as outpt   -nephro following, Cr improving   -transaminitis improved, GI appreciated

## 2025-01-08 NOTE — OCCUPATIONAL THERAPY INITIAL EVALUATION ADULT - PERTINENT HX OF CURRENT PROBLEM, REHAB EVAL
This is a 69-year-old male with past medical history of chronic HFrEF s/p ICD, AVR porcine, HTN, PAF s/p ablation, CAD s/p PCI, DM, HLD, COPD (no home O2), smoker presenting with worsening shortness of breath and weakness.  Patient was admitted on 12/27 for acute on chronic CHF exacerbation, was given IV Lasix and BiPAP, and left AMA.  Patient returns stating that he became more short of breath last night after leaving the hospital and would like to be readmitted for treatment.  Denies fever/chills, chest pain, abdominal pain, calf pain, N/V/D/C, cough, and nasal congestion.

## 2025-01-08 NOTE — OCCUPATIONAL THERAPY INITIAL EVALUATION ADULT - HOME MANAGEMENT SKILLS, PREVIOUS LEVEL OF FUNCTION, OT EVAL
Referred by: Daylin HERNANDEZ DO; Medical Diagnosis (from order):    Diagnosis Information      Diagnosis    719.46 (ICD-9-CM) - M25.561 (ICD-10-CM) - Right knee pain, unspecified chronicity    729.5 (ICD-9-CM) - M79.661 (ICD-10-CM) - Pain of right lower leg         Right foot pain [M79.671]  - Primary       Complex regional pain syndrome type 1 of right lower extremity [G90.521]                 Physical Therapy -  Discharge Summary    Visit: 31  Diagnosis Precautions:  has a past medical history of Allergic Rhinitis, Anxiety, CERUMEN IMPACTION, Chronic constipation, Chronic pain, Constipation, slow transit (5/5/2019), Depression, GERD (gastroesophageal reflux disease) (5/5/2019), Headache(784.0), HEARING LOSS UNSPECIFIED, Kidney stone, Menorrhagia, Mental retardation, mild (I.Q. 50-70), Nerve damage of right foot, OAB (overactive bladder), Otitis media, Sinusitis, chronic, Tilt table evaluation (08/10/2020), TMJ disorder (7/11), and Urinary incontinence. She also has no past medical history of PONV (postoperative nausea and vomiting) or Sleep apnea.     SUBJECTIVE                                                                                                             Patient had an incident with a friend last night.  She is tired today.        She is not having difficulty walking in the community currently.  Stairs have improved with ascent but she continues to feel less balanced with descending stairs.    Functional Change: Still feeling going down stairs is the most challenging   Current functional limitations:   Difficulty on stairs.  Overall she has increasing tolerance to gait in community and at home.  Pain / Symptoms:  Pain rating (out of 10): Current: 0 Location: Right lower extremity   Progression since onset: improved (R foot sensitive, inc knee function)    OBJECTIVE                                                                                                                     Observed Gait:    Assistive Device: no assistive device  Weight bearing: Right: full (Right foot mechanics show normal weight bearing in forfoot)        Palpation:   Spine - Left: Pectoralis Major: hypertonic and tenderness; Pectoralis Minor: hypertonic;   Spine - Right: Pectoralis Major: tenderness and hypertonic; Pectoralis Minor: hypertonic;          Gait:   Gait Analysis:   Comments / Details:   She is neutral on right foot in mid stance phase.    Stair Ambulation:   Descend:     Pattern: step-to    Description and Details: Tolerates X 10 repetitions of step up at 6 in.  7/16/2021   Patient is able to land on bilateral feet X 10 with no discomfort or intolerance.    TREATMENT                                                                                                                  Therapeutic Exercise:  Seated exercise:   Heel and toe raise x 10   Hip flexion x 10   Knee extension x 10   Hip adduction isometric x 10   Hip abduction X 10  Scapular sets X 10  Shoulder rolls posterior X 10  supine exercise   Bilateral bent knee fall out X 10  straight leg-raise x 10   Heel slide x 10   Hip abduction x 10       Skilled input: verbal instruction/cues and tactile instruction/cues    Writer verbally educated and received verbal consent for hand placement, positioning of patient, and techniques to be performed today from patient for therapist position for techniques and hand placement and palpation for techniques as described above and how they are pertinent to the patient's plan of care.    Home Exercise Program: Access Code: [email or web address]  URL: [email or web address]  Date: 07/16/2021  Prepared by: Cherise Chang    Exercises  •Supine Heel Slide - 2 x daily - 7 x weekly - 10 reps - 1 sets - 5 hold  •Small Range Straight Leg Raise - 1 x daily - 7 x weekly - 1 sets - 10 reps  •Supine Hip Abduction - 1-2 x daily - 6-7 x weekly - 10 reps - 1-2 sets  •Bent Knee Fallouts - 1-2 x daily - 6-7 x weekly - 1-2 sets -  10 reps  •Seated March - 2 x daily - 7 x weekly - 10 reps - 1 sets  •Seated Long Arc Quad - 2 x daily - 7 x weekly - 10 reps - 1 sets  •Seated Heel Raise - 2 x daily - 7 x weekly - 10 reps - 1 sets  •Seated Toe Raise  - 2 x daily - 7 x weekly - 10 reps - 1 sets  •Seated Shoulder Setting - 1 x daily - 7 x weekly - 10 reps - 1 sets  •Seated Shoulder Rolls - 1-2 x daily - 6-7 x weekly - 1-2 sets - 10 reps         ASSESSMENT                                                                                                             Patient reports that she has no difficulty with right lower extremity range of motion or strength.  Her right foot paresthesia it is greatly resolved.  She is able to ambulate community distances at bearing speeds with no difficulty.  She continues to have some difficulty with descent of stairs however she is in vestibular therapy and they will work on balance and stair negotiation.      She she will discuss with her physician starting a new episode of therapy for  urge incontinence    We will continue to monitor that she does not become overwhelmed with too many concurrent medical services.      Pain/symptoms after session (out of 10): 1    To date the patient has made gains as expected as reported.  Patient Education:   Results of above outlined education: Verbalizes understanding and Demonstrates understanding      PLAN                                                                                                                           Suggestions for next session as indicated: This episode of care will and today with agreement of patient and therapist that goals have been adequately met.  She will continue with vestibular physical therapy 1 time per week.          GOALS                                                                                                                           decrease pain/stiffness to 2/10 (Goal met) for R lower extremity   improved involved knee ROM to  extension 0° and flexion 120° (Goal met)  improve involved strength to 4+/5  (Goal met)   The above improvements in impairments to assist in obtaining goals listed below  Long Term Goals: to be met by end of plan of care  1. Patient will demonstrate ability to negotiate level and unlevel surfaces at variable velocities, including change of direction without increased pain or instability to return to age appropriate and community activities at prior level of function. (Progressing to goal) knee is functioning well, R foot sensitivity has decreased, allowing for improved walking in the community  Status: met  Patient reported progress, rated 0-10 (0=no progress; 10=goal met): 10  2. Patient will stand for 90 minutes for travel to grocery store. Status: met  Patient reported progress, rated 0-10 (0=no progress; 10=goal met): 10  3. Patient demonstrate 10 seconds of single leg balance with weight-bearing right lower extremity.  (Progressing to goal) weightbearing on right foot is limiting this functional goal currently Status: partially met  Patient reported progress, rated 0-10 (0=no progress; 10=goal met): 8  Average of patient reported progress on goals 1-3: 9.33  4. Patient will ascend and descend 1 flight of steps and with one hand rail for tasks for independent living inc including safely entering and exiting her apartment building.   Status: met     GOALS                                                                                                                       Long Term Goals: To be met by end of plan of care:       Status:  Met     Status:  Met     Status:  Partially met     Status:  Met       Procedures and total treatment time documented Time Entry flowsheet.   independent

## 2025-01-09 LAB
ALBUMIN SERPL ELPH-MCNC: 3 G/DL — LOW (ref 3.5–5.2)
ALP SERPL-CCNC: 69 U/L — SIGNIFICANT CHANGE UP (ref 30–115)
ALT FLD-CCNC: 93 U/L — HIGH (ref 0–41)
ANION GAP SERPL CALC-SCNC: 7 MMOL/L — SIGNIFICANT CHANGE UP (ref 7–14)
APTT BLD: 68.2 SEC — HIGH (ref 27–39.2)
APTT BLD: 77.6 SEC — CRITICAL HIGH (ref 27–39.2)
AST SERPL-CCNC: 38 U/L — SIGNIFICANT CHANGE UP (ref 0–41)
BASOPHILS # BLD AUTO: 0.11 K/UL — SIGNIFICANT CHANGE UP (ref 0–0.2)
BASOPHILS NFR BLD AUTO: 0.6 % — SIGNIFICANT CHANGE UP (ref 0–1)
BILIRUB SERPL-MCNC: 0.6 MG/DL — SIGNIFICANT CHANGE UP (ref 0.2–1.2)
BUN SERPL-MCNC: 35 MG/DL — HIGH (ref 10–20)
CALCIUM SERPL-MCNC: 9.1 MG/DL — SIGNIFICANT CHANGE UP (ref 8.4–10.5)
CHLORIDE SERPL-SCNC: 100 MMOL/L — SIGNIFICANT CHANGE UP (ref 98–110)
CO2 SERPL-SCNC: 31 MMOL/L — SIGNIFICANT CHANGE UP (ref 17–32)
CREAT SERPL-MCNC: 1.8 MG/DL — HIGH (ref 0.7–1.5)
EGFR: 40 ML/MIN/1.73M2 — LOW
EOSINOPHIL # BLD AUTO: 0.31 K/UL — SIGNIFICANT CHANGE UP (ref 0–0.7)
EOSINOPHIL NFR BLD AUTO: 1.8 % — SIGNIFICANT CHANGE UP (ref 0–8)
GLUCOSE BLDC GLUCOMTR-MCNC: 288 MG/DL — HIGH (ref 70–99)
GLUCOSE SERPL-MCNC: 205 MG/DL — HIGH (ref 70–99)
HCT VFR BLD CALC: 40.6 % — LOW (ref 42–52)
HGB BLD-MCNC: 12.7 G/DL — LOW (ref 14–18)
IMM GRANULOCYTES NFR BLD AUTO: 2.2 % — HIGH (ref 0.1–0.3)
LYMPHOCYTES # BLD AUTO: 14.6 % — LOW (ref 20.5–51.1)
LYMPHOCYTES # BLD AUTO: 2.55 K/UL — SIGNIFICANT CHANGE UP (ref 1.2–3.4)
MAGNESIUM SERPL-MCNC: 2 MG/DL — SIGNIFICANT CHANGE UP (ref 1.8–2.4)
MCHC RBC-ENTMCNC: 28.1 PG — SIGNIFICANT CHANGE UP (ref 27–31)
MCHC RBC-ENTMCNC: 31.3 G/DL — LOW (ref 32–37)
MCV RBC AUTO: 89.8 FL — SIGNIFICANT CHANGE UP (ref 80–94)
MONOCYTES # BLD AUTO: 1.46 K/UL — HIGH (ref 0.1–0.6)
MONOCYTES NFR BLD AUTO: 8.3 % — SIGNIFICANT CHANGE UP (ref 1.7–9.3)
NEUTROPHILS # BLD AUTO: 12.68 K/UL — HIGH (ref 1.4–6.5)
NEUTROPHILS NFR BLD AUTO: 72.5 % — SIGNIFICANT CHANGE UP (ref 42.2–75.2)
NRBC # BLD: 0 /100 WBCS — SIGNIFICANT CHANGE UP (ref 0–0)
PHOSPHATE SERPL-MCNC: 1.7 MG/DL — LOW (ref 2.1–4.9)
PLATELET # BLD AUTO: 338 K/UL — SIGNIFICANT CHANGE UP (ref 130–400)
PMV BLD: 12.3 FL — HIGH (ref 7.4–10.4)
POTASSIUM SERPL-MCNC: 4 MMOL/L — SIGNIFICANT CHANGE UP (ref 3.5–5)
POTASSIUM SERPL-SCNC: 4 MMOL/L — SIGNIFICANT CHANGE UP (ref 3.5–5)
PROT SERPL-MCNC: 5.4 G/DL — LOW (ref 6–8)
RBC # BLD: 4.52 M/UL — LOW (ref 4.7–6.1)
RBC # FLD: 13.2 % — SIGNIFICANT CHANGE UP (ref 11.5–14.5)
SODIUM SERPL-SCNC: 138 MMOL/L — SIGNIFICANT CHANGE UP (ref 135–146)
WBC # BLD: 17.5 K/UL — HIGH (ref 4.8–10.8)
WBC # FLD AUTO: 17.5 K/UL — HIGH (ref 4.8–10.8)

## 2025-01-09 PROCEDURE — 71045 X-RAY EXAM CHEST 1 VIEW: CPT | Mod: 26

## 2025-01-09 PROCEDURE — 99233 SBSQ HOSP IP/OBS HIGH 50: CPT

## 2025-01-09 PROCEDURE — 93010 ELECTROCARDIOGRAM REPORT: CPT

## 2025-01-09 RX ORDER — LORAZEPAM 1 MG/1
2 TABLET ORAL ONCE
Refills: 0 | Status: DISCONTINUED | OUTPATIENT
Start: 2025-01-09 | End: 2025-01-09

## 2025-01-09 RX ORDER — QUETIAPINE FUMARATE 100 MG/1
25 TABLET, FILM COATED ORAL ONCE
Refills: 0 | Status: DISCONTINUED | OUTPATIENT
Start: 2025-01-09 | End: 2025-01-10

## 2025-01-09 RX ORDER — MAGNESIUM SULFATE 500 MG/ML
2 INJECTION, SOLUTION INTRAMUSCULAR; INTRAVENOUS ONCE
Refills: 0 | Status: COMPLETED | OUTPATIENT
Start: 2025-01-09 | End: 2025-01-09

## 2025-01-09 RX ORDER — BUMETANIDE 2 MG/1
1 TABLET ORAL ONCE
Refills: 0 | Status: COMPLETED | OUTPATIENT
Start: 2025-01-09 | End: 2025-01-09

## 2025-01-09 RX ADMIN — Medication 2: at 07:42

## 2025-01-09 RX ADMIN — EZETIMIBE 10 MILLIGRAM(S): 10 TABLET ORAL at 12:04

## 2025-01-09 RX ADMIN — INSULIN GLARGINE-YFGN 10 UNIT(S): 100 INJECTION, SOLUTION SUBCUTANEOUS at 21:58

## 2025-01-09 RX ADMIN — LORAZEPAM 2 MILLIGRAM(S): 1 TABLET ORAL at 23:16

## 2025-01-09 RX ADMIN — CHLORHEXIDINE GLUCONATE 1 APPLICATION(S): 1.2 RINSE ORAL at 05:58

## 2025-01-09 RX ADMIN — FENOFIBRATE 145 MILLIGRAM(S): 48 TABLET ORAL at 12:05

## 2025-01-09 RX ADMIN — CLOPIDOGREL BISULFATE 75 MILLIGRAM(S): 75 TABLET, FILM COATED ORAL at 12:05

## 2025-01-09 RX ADMIN — ATORVASTATIN CALCIUM 40 MILLIGRAM(S): 40 TABLET, FILM COATED ORAL at 21:59

## 2025-01-09 RX ADMIN — AMIODARONE HYDROCHLORIDE 200 MILLIGRAM(S): 200 TABLET ORAL at 05:58

## 2025-01-09 RX ADMIN — HEPARIN SODIUM 1300 UNIT(S)/HR: 1000 INJECTION, SOLUTION INTRAVENOUS; SUBCUTANEOUS at 21:58

## 2025-01-09 RX ADMIN — Medication 75 MILLIGRAM(S): at 17:26

## 2025-01-09 RX ADMIN — Medication 75 MILLIGRAM(S): at 00:29

## 2025-01-09 RX ADMIN — BUMETANIDE 1 MILLIGRAM(S): 2 TABLET ORAL at 12:07

## 2025-01-09 RX ADMIN — MAGNESIUM SULFATE 25 GRAM(S): 500 INJECTION, SOLUTION INTRAMUSCULAR; INTRAVENOUS at 13:48

## 2025-01-09 RX ADMIN — Medication 1: at 12:03

## 2025-01-09 RX ADMIN — Medication 1 PACKET(S): at 12:07

## 2025-01-09 RX ADMIN — Medication 75 MILLIGRAM(S): at 12:05

## 2025-01-09 RX ADMIN — Medication 75 MILLIGRAM(S): at 05:58

## 2025-01-09 NOTE — PROGRESS NOTE ADULT - SUBJECTIVE AND OBJECTIVE BOX
MONIQUE LYONS 69y Male  MRN#: [id number]     Hospital Day: 13d      SUBJECTIVE  No acute events overnight, pt seen and examined this morning.                                          ----------------------------------------------------------  OBJECTIVE  PAST MEDICAL & SURGICAL HISTORY  CHF (congestive heart failure)    Diabetes    CAD (coronary artery disease)    Chronic obstructive pulmonary disease (COPD)    HTN (hypertension)    Stented coronary artery    Dyslipidemia    GERD (gastroesophageal reflux disease)    Afib    CVA (cerebral vascular accident)    H/O heart artery stent    Heart valve replaced  aortic    History of Nissen fundoplication                                              -----------------------------------------------------------  ALLERGIES:  sulfa drugs (Unknown)                                            ------------------------------------------------------------    HOME MEDICATIONS  Home Medications:  amiodarone 200 mg oral tablet: 1 tab(s) orally once a day (27 Dec 2024 11:06)  apixaban 5 mg oral tablet: 1 tab(s) orally every 12 hours (26 Dec 2024 15:14)  atorvastatin 40 mg oral tablet: 1 tab(s) orally once a day (27 Dec 2024 11:11)  clopidogrel 75 mg oral tablet: 1 tab(s) orally once a day (26 Dec 2024 15:14)  Entresto 24 mg-26 mg oral tablet: 1 tab(s) orally 2 times a day (26 Dec 2024 15:14)  eszopiclone 3 mg oral tablet: 1 tab(s) orally once a day (at bedtime) (26 Dec 2024 15:14)  ezetimibe 10 mg oral tablet: 1 orally once a day (26 Dec 2024 15:14)  fenofibrate 145 mg oral tablet: 1 orally once a day (26 Dec 2024 15:14)  furosemide 40 mg oral tablet: 1 orally once a day (26 Dec 2024 15:14)  metFORMIN 500 mg oral tablet: 1 tab(s) orally 2 times a day (26 Dec 2024 15:14)  metoprolol succinate 200 mg oral capsule, extended release: 1 cap(s) orally once a day (27 Dec 2024 11:07)  Trulicity Pen 1.5 mg/0.5 mL subcutaneous solution: 1.5 milligram(s) subcutaneously once a week (26 Dec 2024 15:14)                           MEDICATIONS:  STANDING MEDICATIONS  aMIOdarone    Tablet 200 milliGRAM(s) Oral daily  atorvastatin 40 milliGRAM(s) Oral at bedtime  chlorhexidine 2% Cloths 1 Application(s) Topical <User Schedule>  clopidogrel Tablet 75 milliGRAM(s) Oral daily  dextrose 5%. 1000 milliLiter(s) IV Continuous <Continuous>  dextrose 50% Injectable 25 Gram(s) IV Push once  dextrose 50% Injectable 12.5 Gram(s) IV Push once  dextrose 50% Injectable 25 Gram(s) IV Push once  ezetimibe 10 milliGRAM(s) Oral daily  fenofibrate Tablet 145 milliGRAM(s) Oral daily  glucagon  Injectable 1 milliGRAM(s) IntraMuscular once  heparin  Infusion. 900 Unit(s)/Hr IV Continuous <Continuous>  insulin glargine Injectable (LANTUS) 10 Unit(s) SubCutaneous at bedtime  insulin lispro (ADMELOG) corrective regimen sliding scale   SubCutaneous three times a day before meals  metoprolol tartrate 75 milliGRAM(s) Oral every 6 hours  polyethylene glycol 3350 17 Gram(s) Oral two times a day  potassium phosphate / sodium phosphate Powder (PHOS-NaK) 1 Packet(s) Oral three times a day before meals  senna 2 Tablet(s) Oral at bedtime    PRN MEDICATIONS  albuterol/ipratropium for Nebulization 3 milliLiter(s) Nebulizer every 6 hours PRN  aluminum hydroxide/magnesium hydroxide/simethicone Suspension 30 milliLiter(s) Oral every 4 hours PRN  dextrose Oral Gel 15 Gram(s) Oral once PRN  heparin   Injectable 6500 Unit(s) IV Push every 6 hours PRN  heparin   Injectable 3000 Unit(s) IV Push every 6 hours PRN  melatonin 3 milliGRAM(s) Oral at bedtime PRN  ondansetron Injectable 4 milliGRAM(s) IV Push every 8 hours PRN  zolpidem 5 milliGRAM(s) Oral at bedtime PRN                                            ------------------------------------------------------------  VITAL SIGNS: Last 24 Hours  T(C): 36.2 (09 Jan 2025 07:01), Max: 36.2 (09 Jan 2025 07:01)  T(F): 97.2 (09 Jan 2025 07:01), Max: 97.2 (09 Jan 2025 07:01)  HR: 86 (09 Jan 2025 07:10) (81 - 128)  BP: 139/93 (09 Jan 2025 07:10) (112/74 - 139/93)  BP(mean): 109 (09 Jan 2025 07:10) (86 - 109)  RR: 25 (09 Jan 2025 07:10) (14 - 38)  SpO2: 100% (09 Jan 2025 07:10) (97% - 100%)      01-08-25 @ 07:01  -  01-09-25 @ 07:00  --------------------------------------------------------  IN: 1282 mL / OUT: 1550 mL / NET: -268 mL    01-09-25 @ 07:01  -  01-09-25 @ 13:26  --------------------------------------------------------  IN: 210 mL / OUT: 0 mL / NET: 210 mL                                             --------------------------------------------------------------  LABS:                        12.7   17.50 )-----------( 338      ( 09 Jan 2025 05:36 )             40.6     01-09    138  |  100  |  35[H]  ----------------------------<  205[H]  4.0   |  31  |  1.8[H]    Ca    9.1      09 Jan 2025 05:36  Phos  1.7     01-09  Mg     2.0     01-09    TPro  5.4[L]  /  Alb  3.0[L]  /  TBili  0.6  /  DBili  x   /  AST  38  /  ALT  93[H]  /  AlkPhos  69  01-09    PTT - ( 09 Jan 2025 05:36 )  PTT:77.6 sec  Urinalysis Basic - ( 09 Jan 2025 05:36 )    Color: x / Appearance: x / SG: x / pH: x  Gluc: 205 mg/dL / Ketone: x  / Bili: x / Urobili: x   Blood: x / Protein: x / Nitrite: x   Leuk Esterase: x / RBC: x / WBC x   Sq Epi: x / Non Sq Epi: x / Bacteria: x                                                            -------------------------------------------------------------  RADIOLOGY:  CXR      CT                                            --------------------------------------------------------------    PHYSICAL EXAM:  GENERAL: NAD, lying in bed comfortably  CHEST/LUNG: mild crackles   HEART: Regular rate and rhythm; No murmurs, rubs, or gallops  ABDOMEN: Soft, nontender, nondistended  EXTREMITIES:  No clubbing, cyanosis, or edema  NERVOUS SYSTEM:  A&Ox3

## 2025-01-09 NOTE — PROGRESS NOTE ADULT - SUBJECTIVE AND OBJECTIVE BOX
----------Daily Progress Note----------    HISTORY OF PRESENT ILLNESS:  Patient is a 69y old Male who presents with a chief complaint of CHF exacerbation (09 Jan 2025 13:24)    Currently admitted to medicine with the primary diagnosis of CHF exacerbation       Today is hospital day 13d.     INTERVAL HOSPITAL COURSE / OVERNIGHT EVENTS:    Patient was examined and seen at bedside. This morning he is resting comfortably in bed and reports no new issues or overnight events.     Review of Systems: Otherwise unremarkable     <<<<<PAST MEDICAL & SURGICAL HISTORY>>>>>  CHF (congestive heart failure)    Diabetes    CAD (coronary artery disease)    Chronic obstructive pulmonary disease (COPD)    HTN (hypertension)    Stented coronary artery    Dyslipidemia    GERD (gastroesophageal reflux disease)    Afib    CVA (cerebral vascular accident)    H/O heart artery stent    Heart valve replaced  aortic    History of Nissen fundoplication      ALLERGIES  sulfa drugs (Unknown)      Home Medications:  amiodarone 200 mg oral tablet: 1 tab(s) orally once a day (27 Dec 2024 11:06)  apixaban 5 mg oral tablet: 1 tab(s) orally every 12 hours (26 Dec 2024 15:14)  atorvastatin 40 mg oral tablet: 1 tab(s) orally once a day (27 Dec 2024 11:11)  clopidogrel 75 mg oral tablet: 1 tab(s) orally once a day (26 Dec 2024 15:14)  Entresto 24 mg-26 mg oral tablet: 1 tab(s) orally 2 times a day (26 Dec 2024 15:14)  eszopiclone 3 mg oral tablet: 1 tab(s) orally once a day (at bedtime) (26 Dec 2024 15:14)  ezetimibe 10 mg oral tablet: 1 orally once a day (26 Dec 2024 15:14)  fenofibrate 145 mg oral tablet: 1 orally once a day (26 Dec 2024 15:14)  furosemide 40 mg oral tablet: 1 orally once a day (26 Dec 2024 15:14)  metFORMIN 500 mg oral tablet: 1 tab(s) orally 2 times a day (26 Dec 2024 15:14)  metoprolol succinate 200 mg oral capsule, extended release: 1 cap(s) orally once a day (27 Dec 2024 11:07)  Trulicity Pen 1.5 mg/0.5 mL subcutaneous solution: 1.5 milligram(s) subcutaneously once a week (26 Dec 2024 15:14)        MEDICATIONS  STANDING MEDICATIONS  aMIOdarone    Tablet 200 milliGRAM(s) Oral daily  atorvastatin 40 milliGRAM(s) Oral at bedtime  chlorhexidine 2% Cloths 1 Application(s) Topical <User Schedule>  clopidogrel Tablet 75 milliGRAM(s) Oral daily  dextrose 5%. 1000 milliLiter(s) IV Continuous <Continuous>  dextrose 50% Injectable 25 Gram(s) IV Push once  dextrose 50% Injectable 12.5 Gram(s) IV Push once  dextrose 50% Injectable 25 Gram(s) IV Push once  ezetimibe 10 milliGRAM(s) Oral daily  fenofibrate Tablet 145 milliGRAM(s) Oral daily  glucagon  Injectable 1 milliGRAM(s) IntraMuscular once  heparin  Infusion. 900 Unit(s)/Hr IV Continuous <Continuous>  insulin glargine Injectable (LANTUS) 10 Unit(s) SubCutaneous at bedtime  insulin lispro (ADMELOG) corrective regimen sliding scale   SubCutaneous three times a day before meals  metoprolol tartrate 75 milliGRAM(s) Oral every 6 hours  polyethylene glycol 3350 17 Gram(s) Oral two times a day  potassium phosphate / sodium phosphate Powder (PHOS-NaK) 1 Packet(s) Oral three times a day before meals  senna 2 Tablet(s) Oral at bedtime    PRN MEDICATIONS  albuterol/ipratropium for Nebulization 3 milliLiter(s) Nebulizer every 6 hours PRN  aluminum hydroxide/magnesium hydroxide/simethicone Suspension 30 milliLiter(s) Oral every 4 hours PRN  dextrose Oral Gel 15 Gram(s) Oral once PRN  heparin   Injectable 6500 Unit(s) IV Push every 6 hours PRN  heparin   Injectable 3000 Unit(s) IV Push every 6 hours PRN  melatonin 3 milliGRAM(s) Oral at bedtime PRN  ondansetron Injectable 4 milliGRAM(s) IV Push every 8 hours PRN  zolpidem 5 milliGRAM(s) Oral at bedtime PRN    VITALS:  T(F): 97.2  HR: 86  BP: 139/93  RR: 25  SpO2: 100%    <<<<<LABS>>>>>                        12.7   17.50 )-----------( 338      ( 09 Jan 2025 05:36 )             40.6     01-09    138  |  100  |  35[H]  ----------------------------<  205[H]  4.0   |  31  |  1.8[H]    Ca    9.1      09 Jan 2025 05:36  Phos  1.7     01-09  Mg     2.0     01-09    TPro  5.4[L]  /  Alb  3.0[L]  /  TBili  0.6  /  DBili  x   /  AST  38  /  ALT  93[H]  /  AlkPhos  69  01-09    PTT - ( 09 Jan 2025 05:36 )  PTT:77.6 sec  Urinalysis Basic - ( 09 Jan 2025 05:36 )    Color: x / Appearance: x / SG: x / pH: x  Gluc: 205 mg/dL / Ketone: x  / Bili: x / Urobili: x   Blood: x / Protein: x / Nitrite: x   Leuk Esterase: x / RBC: x / WBC x   Sq Epi: x / Non Sq Epi: x / Bacteria: x          [id number]        <<<<<RADIOLOGY>>>>>    <<<<<PHYSICAL EXAM>>>>>  GENERAL: Well developed, well nourished and in no acute distress. Resting comfortably in bed.  HEENT: Normocephalic, atraumatic, mucous membranes moist, EOMI, PERRLA, bilateral sclera anicteric, no conjunctival injection  Neck: Supple, non-tender, no lymphadenopathy.  PULMONARY: Clear to auscultation bilaterally. No rales, rhonchi, or wheezing.  CARDIOVASCULAR: Regular rate and rhythm, S1-S2, no murmurs  GASTROINTESTINAL: Soft, non-tender, non-distended, no guarding.  RENAL: No CVA tenderness.  SKIN/EXTREMITIES: No clubbing or edema  NEUROLOGIC/MUSCULOSKELETAL: AOx4, grossly moving all extremities, no focal deficits.      -----------------------------------------------------------------------------------------------------------------------------------------------------------------------------------------------

## 2025-01-09 NOTE — PROGRESS NOTE ADULT - ASSESSMENT
IMPRESSION:    Acute Hypoxemic Respiratory Failure   Pulmonary Edema  Acute Decompensated HFmrEF   Toxic Metabolic Encephalopathy   Persistent AFIB awaiting Albation   KAREN -   Transaminitis improving   HO ICM s/p CRT-D  HO Bioprosthetic AV  HO CVA  HO DM   HO COPD     PLAN:    CNS:  no sedation   ct brain   d/c  precedex for agitation   ammonia level reviewed   neuro  HEENT: Oral care      PULMONARY:  HOB @ 45 degrees.  keep pox >92%   refusing cxr   CARDIOVASCULAR: Volume contraction as tolerated, optimize cardiac therapy,   give   bumex  1mg today   keep is < =os    Rate control, Cardiology follow up, TTE reviewed, proBNP noted. Wean levophed as tolerated, target SpO2 92-96%.    EP note reviewed   d/c D5 w   GI: GI prophylaxis. If no extubation, OG feeds. Bowel regimen. Trend LFTs  improving   . RUQ US. DC statin. GI evaluation.      RENAL:  Follow up lytes.  Correct as needed.  Consult nephrology. Monitor UO.   follow BUn, cr    replace K   hold bumex   replace K keep around 4  give 20 jose Q12 hrs   BMP after noon   iyer replace for retention   INFECTIOUS DISEASE: Follow up cultures, ABX per ID: s/p cefepime   HEMATOLOGICAL: Full AC. Monitor CBC and coags. target PTT around 70   consider switch to eliquis   ENDOCRINE:  Follow up FS.  Insulin protocol if needed.    MUSCULOSKELETAL: bedrest for now    Goals of care   downgrade to tele or floor disposition as per cardiology and EP  IMPRESSION:    Acute Hypoxemic Respiratory Failure   Pulmonary Edema  Acute Decompensated HFmrEF   Toxic Metabolic Encephalopathy   Persistent AFIB awaiting Albation   KAREN -   Transaminitis improving   HO ICM s/p CRT-D  HO Bioprosthetic AV  HO CVA  HO DM   HO COPD     PLAN:    CNS:  no sedation   ct brain   d/c  precedex for agitation   ammonia level reviewed   neuro  HEENT: Oral care      PULMONARY:  HOB @ 45 degrees.  keep pox >92%   refusing cxr   CARDIOVASCULAR: Volume contraction as tolerated, optimize cardiac therapy,   give   bumex  1mg today   keep is < =os    Rate control, Cardiology follow up, TTE reviewed, proBNP noted. Wean levophed as tolerated, target SpO2 92-96%.    EP note reviewed   d/c D5 w   GI: GI prophylaxis. If no extubation, OG feeds. Bowel regimen. Trend LFTs  improving   . RUQ US. DC statin. GI evaluation.      RENAL:  Follow up lytes.  Correct as needed.  Consult nephrology. Monitor UO.   follow BUn, cr    replace K   hold bumex   replace K keep around 4  give 20 jose Q12 hrs   BMP after noon   iyer replace for retention   INFECTIOUS DISEASE: Follow up cultures, ABX per ID: s/p cefepime   HEMATOLOGICAL: Full AC. Monitor CBC and coags. target PTT around 70   consider switch to eliquis if ok by EP   ENDOCRINE:  Follow up FS.  Insulin protocol if needed.    MUSCULOSKELETAL: bedrest for now    Goals of care   downgrade to tele or floor disposition as per cardiology and EP

## 2025-01-09 NOTE — PROGRESS NOTE ADULT - SUBJECTIVE AND OBJECTIVE BOX
Patient is a 69y old  Male who presents with a chief complaint of CHF exacerbation (08 Jan 2025 12:44)      Over Night Events:  Patient seen and examined.   awake follow command   off precedex     ROS:  See HPI    PHYSICAL EXAM    ICU Vital Signs Last 24 Hrs  T(C): 36.2 (09 Jan 2025 07:01), Max: 36.2 (09 Jan 2025 07:01)  T(F): 97.2 (09 Jan 2025 07:01), Max: 97.2 (09 Jan 2025 07:01)  HR: 81 (09 Jan 2025 05:48) (81 - 128)  BP: 135/74 (09 Jan 2025 05:48) (88/57 - 157/64)  BP(mean): 96 (09 Jan 2025 05:48) (69 - 99)  ABP: --  ABP(mean): --  RR: 20 (09 Jan 2025 07:01) (12 - 38)  SpO2: 97% (09 Jan 2025 05:48) (97% - 99%)    O2 Parameters below as of 09 Jan 2025 05:48  Patient On (Oxygen Delivery Method): nasal cannula  O2 Flow (L/min): 2          General:awake   HEENT:            teodoro    Lymph Nodes: NO cervical LN   Lungs: Bilateral BS  Cardiovascular: Regular   Abdomen: Soft, Positive BS  Extremities: No clubbing   Skin: warm   Neurological: no focal   Musculoskeletal: move all ext     I&O's Detail    08 Jan 2025 07:01  -  09 Jan 2025 07:00  --------------------------------------------------------  IN:    Heparin Infusion: 312 mL    IV PiggyBack: 100 mL    Oral Fluid: 870 mL  Total IN: 1282 mL    OUT:    Indwelling Catheter - Urethral (mL): 1400 mL    Rectal Tube (mL): 150 mL  Total OUT: 1550 mL    Total NET: -268 mL          LABS:                          12.7   17.50 )-----------( 338      ( 09 Jan 2025 05:36 )             40.6         09 Jan 2025 05:36    138    |  100    |  35     ----------------------------<  205    4.0     |  31     |  1.8      Ca    9.1        09 Jan 2025 05:36  Phos  1.7       09 Jan 2025 05:36  Mg     2.0       09 Jan 2025 05:36    TPro  5.4    /  Alb  3.0    /  TBili  0.6    /  DBili  x      /  AST  38     /  ALT  93     /  AlkPhos  69     09 Jan 2025 05:36  Amylase x     lipase x                                                 PTT - ( 08 Jan 2025 06:22 )  PTT:83.0 sec                                       Urinalysis Basic - ( 09 Jan 2025 05:36 )    Color: x / Appearance: x / SG: x / pH: x  Gluc: 205 mg/dL / Ketone: x  / Bili: x / Urobili: x   Blood: x / Protein: x / Nitrite: x   Leuk Esterase: x / RBC: x / WBC x   Sq Epi: x / Non Sq Epi: x / Bacteria: x                                                                                                                                                     MEDICATIONS  (STANDING):  aMIOdarone    Tablet 200 milliGRAM(s) Oral daily  atorvastatin 40 milliGRAM(s) Oral at bedtime  chlorhexidine 2% Cloths 1 Application(s) Topical <User Schedule>  clopidogrel Tablet 75 milliGRAM(s) Oral daily  dextrose 5%. 1000 milliLiter(s) (50 mL/Hr) IV Continuous <Continuous>  dextrose 50% Injectable 25 Gram(s) IV Push once  dextrose 50% Injectable 12.5 Gram(s) IV Push once  dextrose 50% Injectable 25 Gram(s) IV Push once  ezetimibe 10 milliGRAM(s) Oral daily  fenofibrate Tablet 145 milliGRAM(s) Oral daily  glucagon  Injectable 1 milliGRAM(s) IntraMuscular once  heparin  Infusion. 900 Unit(s)/Hr (9 mL/Hr) IV Continuous <Continuous>  insulin glargine Injectable (LANTUS) 10 Unit(s) SubCutaneous at bedtime  insulin lispro (ADMELOG) corrective regimen sliding scale   SubCutaneous three times a day before meals  metoprolol tartrate 75 milliGRAM(s) Oral every 6 hours  polyethylene glycol 3350 17 Gram(s) Oral two times a day  potassium phosphate / sodium phosphate Powder (PHOS-NaK) 1 Packet(s) Oral three times a day before meals  senna 2 Tablet(s) Oral at bedtime    MEDICATIONS  (PRN):  albuterol/ipratropium for Nebulization 3 milliLiter(s) Nebulizer every 6 hours PRN Shortness of Breath and/or Wheezing  aluminum hydroxide/magnesium hydroxide/simethicone Suspension 30 milliLiter(s) Oral every 4 hours PRN Dyspepsia  dextrose Oral Gel 15 Gram(s) Oral once PRN Blood Glucose LESS THAN 70 milliGRAM(s)/deciliter  heparin   Injectable 6500 Unit(s) IV Push every 6 hours PRN For aPTT less than 40  heparin   Injectable 3000 Unit(s) IV Push every 6 hours PRN For aPTT between 40 - 57  melatonin 3 milliGRAM(s) Oral at bedtime PRN Insomnia  ondansetron Injectable 4 milliGRAM(s) IV Push every 8 hours PRN Nausea and/or Vomiting  zolpidem 5 milliGRAM(s) Oral at bedtime PRN Insomnia          Xrays:   small b/l effusion    ECHO:  [facility] ICU:

## 2025-01-09 NOTE — PROGRESS NOTE ADULT - ASSESSMENT
69-year-old male with past medical history of chronic HFrEF s/p ICD, AVR porcine, HTN, PAF s/p ablation, CAD s/p PCI, DM, HLD, COPD (no home O2), smoker presenting with worsening shortness of breath and weakness with concern for acute on chronic CHF exacerbation in the setting of persistent afib    Acute Hypoxemic Respiratory Failure due to pulmonary edema/acute on chronic HFmrEF   toxic metabolic encephalopathy  Chronic afib   KAREN   Transaminitis, congestive hepatopathy   HO ICM s/p CRT-D  HO Bioprosthetic AV  HO CVA  HO DM   HO COPD     - CT head negative  - s/p course of cefipime   - CXR improved  -wean O2, get accurate pule ox, lots of artifact and movement affecting read   -bumex time 1 today   -off precedex  -avoid qtc prolonging agents, qtc 580, avoid anti-psychotics, re-orient frequently    - supplement Mg, moniotr lytes, K>4,Mg>2  -Discussed with EP today, they are planning for ablation next week, transfer pt to [address] approved by Dr. Tutu drake following, Cr stable  -transaminitis improved, GI appreciated

## 2025-01-09 NOTE — PROGRESS NOTE ADULT - ASSESSMENT
69-year-old male with past medical history of chronic HFrEF s/p ICD, AVR porcine, HTN, PAF s/p ablation, CAD s/p PCI, DM, HLD, COPD (no home O2), smoker presenting with worsening shortness of breath and weakness with concern for acute on chronic CHF exacerbation in the setting of persistent afib    Acute Hypoxemic Respiratory Failure due to pulmonary edema/acute on chronic HFmrEF   toxic metabolic encephalopathy  Chronic afib   KAREN   Transaminitis, congestive hepatopathy   HO ICM s/p CRT-D  HO Bioprosthetic AV  HO CVA  HO DM   HO COPD     - CT head negative  - s/p course of cefipime   - CXR improved  -wean O2, get accurate pule ox, lots of artifact and movement affecting read   -bumex time 1 today   -off precedex  -avoid qtc prolonging agents, qtc 580, avoid anti-psychotics, re-orient frequently    - supplement Mg, moniotr lytes, K>4,Mg>2  -Discussed with EP today, they are planning for ablation next week, transfer pt to [address] approved by Dr. Tutu drake following, Cr stable  -transaminitis improved, GI appreciated

## 2025-01-10 LAB
AMMONIA BLD-MCNC: 41 UMOL/L — SIGNIFICANT CHANGE UP (ref 11–55)
ANION GAP SERPL CALC-SCNC: 10 MMOL/L — SIGNIFICANT CHANGE UP (ref 7–14)
ANION GAP SERPL CALC-SCNC: 9 MMOL/L — SIGNIFICANT CHANGE UP (ref 7–14)
APTT BLD: 67.8 SEC — HIGH (ref 27–39.2)
BASE EXCESS BLDA CALC-SCNC: 9.1 MMOL/L — HIGH (ref -2–3)
BASOPHILS # BLD AUTO: 0.13 K/UL — SIGNIFICANT CHANGE UP (ref 0–0.2)
BASOPHILS NFR BLD AUTO: 0.6 % — SIGNIFICANT CHANGE UP (ref 0–1)
BUN SERPL-MCNC: 29 MG/DL — HIGH (ref 10–20)
BUN SERPL-MCNC: 30 MG/DL — HIGH (ref 10–20)
CALCIUM SERPL-MCNC: 9.1 MG/DL — SIGNIFICANT CHANGE UP (ref 8.4–10.5)
CALCIUM SERPL-MCNC: 9.8 MG/DL — SIGNIFICANT CHANGE UP (ref 8.4–10.4)
CHLORIDE SERPL-SCNC: 101 MMOL/L — SIGNIFICANT CHANGE UP (ref 98–110)
CHLORIDE SERPL-SCNC: 101 MMOL/L — SIGNIFICANT CHANGE UP (ref 98–110)
CO2 SERPL-SCNC: 30 MMOL/L — SIGNIFICANT CHANGE UP (ref 17–32)
CO2 SERPL-SCNC: 32 MMOL/L — SIGNIFICANT CHANGE UP (ref 17–32)
CREAT SERPL-MCNC: 1.5 MG/DL — SIGNIFICANT CHANGE UP (ref 0.7–1.5)
CREAT SERPL-MCNC: 1.6 MG/DL — HIGH (ref 0.7–1.5)
EGFR: 46 ML/MIN/1.73M2 — LOW
EGFR: 50 ML/MIN/1.73M2 — LOW
EOSINOPHIL # BLD AUTO: 0.24 K/UL — SIGNIFICANT CHANGE UP (ref 0–0.7)
EOSINOPHIL NFR BLD AUTO: 1.2 % — SIGNIFICANT CHANGE UP (ref 0–8)
GLUCOSE BLDC GLUCOMTR-MCNC: 134 MG/DL — HIGH (ref 70–99)
GLUCOSE BLDC GLUCOMTR-MCNC: 167 MG/DL — HIGH (ref 70–99)
GLUCOSE BLDC GLUCOMTR-MCNC: 173 MG/DL — HIGH (ref 70–99)
GLUCOSE BLDC GLUCOMTR-MCNC: 231 MG/DL — HIGH (ref 70–99)
GLUCOSE SERPL-MCNC: 168 MG/DL — HIGH (ref 70–99)
GLUCOSE SERPL-MCNC: 260 MG/DL — HIGH (ref 70–99)
HCO3 BLDA-SCNC: 34 MMOL/L — HIGH (ref 21–28)
HCT VFR BLD CALC: 42.7 % — SIGNIFICANT CHANGE UP (ref 42–52)
HGB BLD-MCNC: 13.7 G/DL — LOW (ref 14–18)
HOROWITZ INDEX BLDA+IHG-RTO: 60 — SIGNIFICANT CHANGE UP
IMM GRANULOCYTES NFR BLD AUTO: 1.8 % — HIGH (ref 0.1–0.3)
LACTATE SERPL-SCNC: 1.5 MMOL/L — SIGNIFICANT CHANGE UP (ref 0.7–2)
LYMPHOCYTES # BLD AUTO: 12 % — LOW (ref 20.5–51.1)
LYMPHOCYTES # BLD AUTO: 2.41 K/UL — SIGNIFICANT CHANGE UP (ref 1.2–3.4)
MAGNESIUM SERPL-MCNC: 2.1 MG/DL — SIGNIFICANT CHANGE UP (ref 1.8–2.4)
MCHC RBC-ENTMCNC: 29 PG — SIGNIFICANT CHANGE UP (ref 27–31)
MCHC RBC-ENTMCNC: 32.1 G/DL — SIGNIFICANT CHANGE UP (ref 32–37)
MCV RBC AUTO: 90.3 FL — SIGNIFICANT CHANGE UP (ref 80–94)
MONOCYTES # BLD AUTO: 1.49 K/UL — HIGH (ref 0.1–0.6)
MONOCYTES NFR BLD AUTO: 7.4 % — SIGNIFICANT CHANGE UP (ref 1.7–9.3)
NEUTROPHILS # BLD AUTO: 15.48 K/UL — HIGH (ref 1.4–6.5)
NEUTROPHILS NFR BLD AUTO: 77 % — HIGH (ref 42.2–75.2)
NRBC # BLD: 0 /100 WBCS — SIGNIFICANT CHANGE UP (ref 0–0)
NT-PROBNP SERPL-SCNC: HIGH PG/ML (ref 0–300)
PCO2 BLDA: 47 MMHG — SIGNIFICANT CHANGE UP (ref 35–48)
PH BLDA: 7.47 — HIGH (ref 7.35–7.45)
PLATELET # BLD AUTO: 396 K/UL — SIGNIFICANT CHANGE UP (ref 130–400)
PMV BLD: 12.2 FL — HIGH (ref 7.4–10.4)
PO2 BLDA: 60 MMHG — LOW (ref 83–108)
POTASSIUM SERPL-MCNC: 4 MMOL/L — SIGNIFICANT CHANGE UP (ref 3.5–5)
POTASSIUM SERPL-MCNC: 4.3 MMOL/L — SIGNIFICANT CHANGE UP (ref 3.5–5)
POTASSIUM SERPL-SCNC: 4 MMOL/L — SIGNIFICANT CHANGE UP (ref 3.5–5)
POTASSIUM SERPL-SCNC: 4.3 MMOL/L — SIGNIFICANT CHANGE UP (ref 3.5–5)
PROCALCITONIN SERPL-MCNC: 0.12 NG/ML — HIGH (ref 0.02–0.1)
RBC # BLD: 4.73 M/UL — SIGNIFICANT CHANGE UP (ref 4.7–6.1)
RBC # FLD: 13.4 % — SIGNIFICANT CHANGE UP (ref 11.5–14.5)
SAO2 % BLDA: 90.3 % — LOW (ref 94–98)
SODIUM SERPL-SCNC: 140 MMOL/L — SIGNIFICANT CHANGE UP (ref 135–146)
SODIUM SERPL-SCNC: 143 MMOL/L — SIGNIFICANT CHANGE UP (ref 135–146)
WBC # BLD: 20.11 K/UL — HIGH (ref 4.8–10.8)
WBC # FLD AUTO: 20.11 K/UL — HIGH (ref 4.8–10.8)

## 2025-01-10 PROCEDURE — 99233 SBSQ HOSP IP/OBS HIGH 50: CPT

## 2025-01-10 PROCEDURE — 71045 X-RAY EXAM CHEST 1 VIEW: CPT | Mod: 26

## 2025-01-10 RX ORDER — FUROSEMIDE 20 MG
40 TABLET ORAL ONCE
Refills: 0 | Status: COMPLETED | OUTPATIENT
Start: 2025-01-10 | End: 2025-01-10

## 2025-01-10 RX ORDER — APIXABAN 5 MG/1
5 TABLET, FILM COATED ORAL EVERY 12 HOURS
Refills: 0 | Status: DISCONTINUED | OUTPATIENT
Start: 2025-01-10 | End: 2025-01-13

## 2025-01-10 RX ORDER — QUETIAPINE FUMARATE 100 MG/1
12.5 TABLET, FILM COATED ORAL AT BEDTIME
Refills: 0 | Status: DISCONTINUED | OUTPATIENT
Start: 2025-01-10 | End: 2025-01-16

## 2025-01-10 RX ADMIN — FENOFIBRATE 145 MILLIGRAM(S): 48 TABLET ORAL at 11:35

## 2025-01-10 RX ADMIN — AMIODARONE HYDROCHLORIDE 200 MILLIGRAM(S): 200 TABLET ORAL at 06:15

## 2025-01-10 RX ADMIN — HEPARIN SODIUM 1300 UNIT(S)/HR: 1000 INJECTION, SOLUTION INTRAVENOUS; SUBCUTANEOUS at 07:11

## 2025-01-10 RX ADMIN — Medication 75 MILLIGRAM(S): at 17:04

## 2025-01-10 RX ADMIN — CLOPIDOGREL BISULFATE 75 MILLIGRAM(S): 75 TABLET, FILM COATED ORAL at 11:35

## 2025-01-10 RX ADMIN — Medication 75 MILLIGRAM(S): at 06:15

## 2025-01-10 RX ADMIN — APIXABAN 5 MILLIGRAM(S): 5 TABLET, FILM COATED ORAL at 17:04

## 2025-01-10 RX ADMIN — QUETIAPINE FUMARATE 12.5 MILLIGRAM(S): 100 TABLET, FILM COATED ORAL at 22:11

## 2025-01-10 RX ADMIN — Medication 1 PACKET(S): at 11:42

## 2025-01-10 RX ADMIN — EZETIMIBE 10 MILLIGRAM(S): 10 TABLET ORAL at 11:34

## 2025-01-10 RX ADMIN — INSULIN GLARGINE-YFGN 10 UNIT(S): 100 INJECTION, SOLUTION SUBCUTANEOUS at 22:10

## 2025-01-10 RX ADMIN — CHLORHEXIDINE GLUCONATE 1 APPLICATION(S): 1.2 RINSE ORAL at 06:15

## 2025-01-10 RX ADMIN — Medication 40 MILLIGRAM(S): at 08:00

## 2025-01-10 RX ADMIN — Medication 2: at 07:42

## 2025-01-10 RX ADMIN — ATORVASTATIN CALCIUM 40 MILLIGRAM(S): 40 TABLET, FILM COATED ORAL at 22:11

## 2025-01-10 RX ADMIN — Medication 1 PACKET(S): at 06:18

## 2025-01-10 RX ADMIN — Medication 75 MILLIGRAM(S): at 23:45

## 2025-01-10 RX ADMIN — Medication 75 MILLIGRAM(S): at 11:34

## 2025-01-10 RX ADMIN — Medication 40 MILLIGRAM(S): at 20:17

## 2025-01-10 NOTE — CONSULT NOTE ADULT - CONSULT REQUESTED DATE/TIME
01-Jan-2025 16:14
06-Jan-2025 12:08
10-Dameon-2025 11:47
31-Dec-2024 14:05
29-Dec-2024 10:49
05-Jan-2025 11:37
27-Dec-2024 13:10

## 2025-01-10 NOTE — CHART NOTE - NSCHARTNOTEFT_GEN_A_CORE
Transfer from: [facility] Telemetry    Transfer to: [address] [place] [address]    Sign out given to: Ant Rahman    HPI / HOSPITAL COURSE:  This is a 69-year-old male with past medical history of chronic HFrEF s/p ICD, AVR porcine, HTN, PAF s/p ablation, CAD s/p PCI, DM, HLD, COPD (no home O2), smoker presenting with worsening shortness of breath and weakness.  Patient was admitted within the last 24 hours for acute on chronic CHF exacerbation, was given IV Lasix and BiPAP, and left AMA.  Patient returns stating that he became more short of breath last night after leaving the hospital and would like to be readmitted for treatment.  Denies fever/chills, chest pain, abdominal pain, calf pain, N/V/D/C, cough, and nasal congestion.     [facility] Med/Tele Course:  12/27/24-12/31/24: Patient admitted for acute on chronic systolic CHF vs Pneumonia. initial BNP 5K, leukocytosis, no fevers. CXR with bilateral lung opacities. Diuresed with Lasix 40mg, received IV ceftriaxone and azithromycin. TTE with EF 30-35%. On 12/31/24, a rapid response was called for tachycardia, chest pain, and hypoxia. Patient was noted to have multiple runs of Vtach on monitor. EKG with ventricular paced rhythm, O2 improved with supplemental oxygen, IV lasix and IV lopressor 5mg given, stabilized. Another rapid was called that same evening, with patient becoming more tachypneic, now hypoxic. CXR showed worsening fluid overload/flash pulmonary edema. Patient was becoming more somnolent and with increased labored breathing. [facility] discussions had with son Neto, patient remained full CODE. Decision was made to intubate patient and upgraded to ICU.    [facility] ICU course:  12/31/24-01/04/25:      ASSESSMENT & PLAN:   69-year-old male with past medical history of chronic HFrEF s/p ICD, AVR porcine, HTN, PAF s/p ablation, CAD s/p PCI, DM, HLD, COPD (no home O2), smoker presenting with worsening shortness of breath and weakness with concern for acute on chronic CHF exacerbation in the setting of persistent afib    Acute Hypoxemic Respiratory Failure due to pulmonary edema/acute on chronic HFmrEF   toxic metabolic encephalopathy  Chronic afib   KAREN   Transaminitis, congestive hepatopathy   HO ICM s/p CRT-D  HO Bioprosthetic AV  HO CVA  HO DM   HO COPD     - CT head negative  - s/p course of cefipime   - CXR improved  -wean O2, get accurate pule ox, lots of artifact and movement affecting read   -bumex time 1 today   -off precedex  -avoid qtc prolonging agents, qtc 580, avoid anti-psychotics, re-orient frequently   -supplement Mg, moniotr lytes, K>4,Mg>2  -Discussed with EP today, they are planning for ablation next week, transfer pt to [address] approved by Dr. Tutu drake following, Cr stable  -transaminitis improved, GI appreciated       FOR FOLLOW UP:  EP for inpatient ablation next week  Continue to monitor respiratory status  Continue to monitor I&Os Transfer from: [facility] Telemetry    Transfer to: [address] [place] [address]    Sign out given to: Josemitzy Leobardoconner    HPI / HOSPITAL COURSE:  This is a 69-year-old male with past medical history of chronic HFrEF s/p ICD, AVR porcine, HTN, PAF s/p ablation, CAD s/p PCI, DM, HLD, COPD (no home O2), smoker presenting with worsening shortness of breath and weakness.  Patient was admitted within the last 24 hours for acute on chronic CHF exacerbation, was given IV Lasix and BiPAP, and left AMA.  Patient returns stating that he became more short of breath last night after leaving the hospital and would like to be readmitted for treatment.  Denies fever/chills, chest pain, abdominal pain, calf pain, N/V/D/C, cough, and nasal congestion.     [facility] Med/Tele Course:  12/27/24-12/31/24: Patient admitted for acute on chronic systolic CHF vs Pneumonia. initial BNP 5K, leukocytosis, no fevers. CXR with bilateral lung opacities. Diuresed with Lasix 40mg, received IV ceftriaxone and azithromycin. TTE with EF 30-35%. On 12/31/24, a rapid response was called for tachycardia, chest pain, and hypoxia. Patient was noted to have multiple runs of Vtach on monitor. EKG with ventricular paced rhythm, O2 improved with supplemental oxygen, IV lasix and IV lopressor 5mg given, stabilized. Another rapid was called that same evening, with patient becoming more tachypneic, now hypoxic. CXR showed worsening fluid overload/flash pulmonary edema. Patient was becoming more somnolent and with increased labored breathing. [facility] discussions had with son Neto, patient remained full CODE. Decision was made to intubate patient and upgraded to ICU.    [facility] ICU course:  12/31/24-01/04/25: Patient remained intubated, mechanically ventilated for 4 hospital days (12/31-1/04/25). Patient diuresed with Bumex BID. On 1/04/25 while on SAT/SBT, patient self extubated. Was sating 100% on room air and remained hemodynamically stable, but given history of HF, placed on AVAPs for support. Patient did not tolerate AVAPS and self removed, but has since tolerated NC and now RA. Transaminitis noted, with a peak AST 1500 and  (1/3). RUQ negative for acute pathology, CT abdomen and pelvis with oral contrast negative for acute pathology, LFTs have stabilized and decreased since. On 1/6-1/7 overnight, patient noted to be agitated and required sedation with benzodiazepines, noted to be confused in the morning, CT head negative. Has since improved mentation and agitation, was intermittently agitated requiring use of Precedex drip, likely hospital delirium vs sundowning. Now hemodynamically stable on room air, off any sedation, mentating well and following commands. Remains on heparin ggt.      ASSESSMENT & PLAN:   69-year-old male with past medical history of chronic HFrEF s/p ICD, AVR porcine, HTN, PAF s/p ablation, CAD s/p PCI, DM, HLD, COPD (no home O2), smoker presenting with worsening shortness of breath and weakness with concern for acute on chronic CHF exacerbation in the setting of persistent afib    Acute Hypoxemic Respiratory Failure due to pulmonary edema/acute on chronic HFmrEF   toxic metabolic encephalopathy  Chronic afib   KAREN   Transaminitis, congestive hepatopathy   HO ICM s/p CRT-D  HO Bioprosthetic AV  HO CVA  HO DM   HO COPD     - CT head negative  - s/p course of cefipime   - CXR improved  -wean O2, get accurate pule ox, lots of artifact and movement affecting read   -bumex time 1 today   -off precedex  -avoid qtc prolonging agents, qtc 580, avoid anti-psychotics, re-orient frequently   -supplement Mg, moniotr lytes, K>4,Mg>2  -Discussed with EP today, they are planning for ablation next week, transfer pt to [address] approved by Dr. Tutu drake following, Cr stable  -transaminitis improved, GI appreciated       FOR FOLLOW UP:  EP for inpatient ablation next week  Continue to monitor respiratory status  Continue to monitor I&Os

## 2025-01-10 NOTE — CONSULT NOTE ADULT - CONSULT REASON
Leukocytosis
"KAREN in the setting of diuresis; consult as per ICU attending"
CHF Exacerbation
Infection
pulmonary edema
altered LFTs
AF

## 2025-01-10 NOTE — CONSULT NOTE ADULT - TIME BILLING
AF
Coordination of care
I have personally seen and examined this patient.    I have reviewed all pertinent clinical information and reviewed all relevant imaging and diagnostic studies personally.   I counseled the patient about diagnostic testing and treatment plan. All questions were answered.   I discussed recommendations with the primary team.

## 2025-01-10 NOTE — PROGRESS NOTE ADULT - SUBJECTIVE AND OBJECTIVE BOX
INTERVAL HPI/OVERNIGHT EVENTS:  transferred from [facility]      MEDICATIONS  (STANDING):  aMIOdarone    Tablet 200 milliGRAM(s) Oral daily  apixaban 5 milliGRAM(s) Oral every 12 hours  atorvastatin 40 milliGRAM(s) Oral at bedtime  chlorhexidine 2% Cloths 1 Application(s) Topical <User Schedule>  clopidogrel Tablet 75 milliGRAM(s) Oral daily  dextrose 5%. 1000 milliLiter(s) (50 mL/Hr) IV Continuous <Continuous>  dextrose 50% Injectable 25 Gram(s) IV Push once  dextrose 50% Injectable 12.5 Gram(s) IV Push once  dextrose 50% Injectable 25 Gram(s) IV Push once  ezetimibe 10 milliGRAM(s) Oral daily  fenofibrate Tablet 145 milliGRAM(s) Oral daily  glucagon  Injectable 1 milliGRAM(s) IntraMuscular once  insulin glargine Injectable (LANTUS) 10 Unit(s) SubCutaneous at bedtime  insulin lispro (ADMELOG) corrective regimen sliding scale   SubCutaneous three times a day before meals  metoprolol tartrate 75 milliGRAM(s) Oral every 6 hours  polyethylene glycol 3350 17 Gram(s) Oral two times a day  senna 2 Tablet(s) Oral at bedtime    MEDICATIONS  (PRN):  albuterol/ipratropium for Nebulization 3 milliLiter(s) Nebulizer every 6 hours PRN Shortness of Breath and/or Wheezing  dextrose Oral Gel 15 Gram(s) Oral once PRN Blood Glucose LESS THAN 70 milliGRAM(s)/deciliter  melatonin 3 milliGRAM(s) Oral at bedtime PRN Insomnia      Allergies    sulfa drugs (Unknown)    Intolerances        REVIEW OF SYSTEMS    [ ] A ten-point review of systems was otherwise negative except as noted.  [ ] Due to altered mental status/intubation, subjective information were not able to be obtained from the patient. History was obtained, to the extent possible, from review of the chart and collateral sources of information.      Vital Signs Last 24 Hrs  T(C): 37.2 (10 Dameon 2025 07:14), Max: 37.3 (10 Dameon 2025 06:41)  T(F): 99 (10 Dameon 2025 07:14), Max: 99.2 (10 Dameon 2025 06:41)  HR: 108 (10 Dameon 2025 11:38) (79 - 116)  BP: 133/75 (10 Dameon 2025 11:38) (128/65 - 141/85)  BP(mean): 93 (10 Dameon 2025 11:38) (88 - 110)  RR: 20 (10 Dameon 2025 11:38) (15 - 22)  SpO2: 95% (10 Dameon 2025 11:38) (94% - 100%)    Parameters below as of 10 Dameon 2025 07:14  Patient On (Oxygen Delivery Method): nasal cannula  O2 Flow (L/min): 4      01-09-25 @ 07:01  -  01-10-25 @ 07:00  --------------------------------------------------------  IN: 1279 mL / OUT: 1801 mL / NET: -522 mL    01-10-25 @ 07:01  -  01-10-25 @ 13:46  --------------------------------------------------------  IN: 52 mL / OUT: 1100 mL / NET: -1048 mL        PHYSICAL EXAM:    GENERAL: In no apparent distress, well nourished, and hydrated.  HEART: Regular rate and rhythm; No murmur; NO rubs, or gallops.  PULMONARY: Clear to auscultation and percussion.  Normal expansion/effort. No rales, wheezing, or rhonchi bilaterally.  ABDOMEN: Soft, Nontender, Nondistended; Bowel sounds present  EXTREMITIES:  Extremities warm, pink, well-perfused, 2+ Peripheral Pulses, No clubbing, cyanosis, or edema  NEUROLOGICAL: alert & oriented x 3, no focal deficits, PERRLA, EOMI    LABS:                        13.7   20.11 )-----------( 396      ( 10 Dameon 2025 05:31 )             42.7     01-10    140  |  101  |  30[H]  ----------------------------<  260[H]  4.0   |  30  |  1.6[H]    Ca    9.1      10 Dameon 2025 05:31  Phos  1.7     01-09  Mg     2.0     01-09    TPro  5.4[L]  /  Alb  3.0[L]  /  TBili  0.6  /  DBili  x   /  AST  38  /  ALT  93[H]  /  AlkPhos  69  01-09    PTT - ( 10 Damoen 2025 04:54 )  PTT:67.8 sec        12 Lead ECG:   Ventricular Rate 74 BPM    Atrial Rate 277 BPM    QRS Duration 154 ms    Q-T Interval 492 ms    QTC Calculation(Bazett) 546 ms    R Axis 246 degrees    T Axis 71 degrees    Diagnosis Line Ventricular-paced rhythm  Biventricular pacemaker detected  Abnormal ECG    Confirmed by JESSICA TIAN MD ([id number]) on 1/10/2025 7:03:16 AM (01-09-25 @ 22:44)  12 Lead ECG:   Ventricular Rate 95 BPM    Atrial Rate 104 BPM    QRS Duration 154 ms    Q-T Interval 464 ms    QTC Calculation(Bazett) 583 ms    P Axis 85 degrees    R Axis 240 degrees    T Axis 81 degrees    Diagnosis Line Ventricular-paced rhythm  Abnormal ECG    Confirmed by Suleman Bhatt ([id number]) on 1/8/2025 10:28:10 AM (01-08-25 @ 09:48)      RADIOLOGY & ADDITIONAL TESTS:       INTERVAL HPI/OVERNIGHT EVENTS:  transferred from [facility] in [place]  reported being agitated on 1:1 observation  however AAO x3, answers all questions apparently       MEDICATIONS  (STANDING):  aMIOdarone    Tablet 200 milliGRAM(s) Oral daily  apixaban 5 milliGRAM(s) Oral every 12 hours  atorvastatin 40 milliGRAM(s) Oral at bedtime  chlorhexidine 2% Cloths 1 Application(s) Topical <User Schedule>  clopidogrel Tablet 75 milliGRAM(s) Oral daily  dextrose 5%. 1000 milliLiter(s) (50 mL/Hr) IV Continuous <Continuous>  dextrose 50% Injectable 25 Gram(s) IV Push once  dextrose 50% Injectable 12.5 Gram(s) IV Push once  dextrose 50% Injectable 25 Gram(s) IV Push once  ezetimibe 10 milliGRAM(s) Oral daily  fenofibrate Tablet 145 milliGRAM(s) Oral daily  glucagon  Injectable 1 milliGRAM(s) IntraMuscular once  insulin glargine Injectable (LANTUS) 10 Unit(s) SubCutaneous at bedtime  insulin lispro (ADMELOG) corrective regimen sliding scale   SubCutaneous three times a day before meals  metoprolol tartrate 75 milliGRAM(s) Oral every 6 hours  polyethylene glycol 3350 17 Gram(s) Oral two times a day  senna 2 Tablet(s) Oral at bedtime    MEDICATIONS  (PRN):  albuterol/ipratropium for Nebulization 3 milliLiter(s) Nebulizer every 6 hours PRN Shortness of Breath and/or Wheezing  dextrose Oral Gel 15 Gram(s) Oral once PRN Blood Glucose LESS THAN 70 milliGRAM(s)/deciliter  melatonin 3 milliGRAM(s) Oral at bedtime PRN Insomnia      Allergies    sulfa drugs (Unknown)    Intolerances        REVIEW OF SYSTEMS    [ ] A ten-point review of systems was otherwise negative except as noted.  [ ] Due to altered mental status/intubation, subjective information were not able to be obtained from the patient. History was obtained, to the extent possible, from review of the chart and collateral sources of information.      Vital Signs Last 24 Hrs  T(C): 37.2 (10 Dameon 2025 07:14), Max: 37.3 (10 Dameon 2025 06:41)  T(F): 99 (10 Dameon 2025 07:14), Max: 99.2 (10 Dameon 2025 06:41)  HR: 108 (10 Dameon 2025 11:38) (79 - 116)  BP: 133/75 (10 Dameon 2025 11:38) (128/65 - 141/85)  BP(mean): 93 (10 Dameon 2025 11:38) (88 - 110)  RR: 20 (10 Dameon 2025 11:38) (15 - 22)  SpO2: 95% (10 Dameon 2025 11:38) (94% - 100%)    Parameters below as of 10 Dameon 2025 07:14  Patient On (Oxygen Delivery Method): nasal cannula  O2 Flow (L/min): 4      01-09-25 @ 07:01  -  01-10-25 @ 07:00  --------------------------------------------------------  IN: 1279 mL / OUT: 1801 mL / NET: -522 mL    01-10-25 @ 07:01  -  01-10-25 @ 13:46  --------------------------------------------------------  IN: 52 mL / OUT: 1100 mL / NET: -1048 mL        PHYSICAL EXAM:    GENERAL: In no apparent distress, well nourished, and hydrated.  HEART: Regular rate and rhythm; No murmur; NO rubs, or gallops.  PULMONARY: Clear to auscultation and percussion.  Normal expansion/effort. No rales, wheezing, or rhonchi bilaterally.  ABDOMEN: Soft, Nontender, Nondistended; Bowel sounds present  EXTREMITIES:  Extremities warm, pink, well-perfused, 2+ Peripheral Pulses, No clubbing, cyanosis, or edema  NEUROLOGICAL: alert & oriented x 3, no focal deficits, COURTNEY GARCIA    LABS:                        13.7   20.11 )-----------( 396      ( 10 Dameon 2025 05:31 )             42.7     01-10    140  |  101  |  30[H]  ----------------------------<  260[H]  4.0   |  30  |  1.6[H]    Ca    9.1      10 Dameon 2025 05:31  Phos  1.7     01-09  Mg     2.0     01-09    TPro  5.4[L]  /  Alb  3.0[L]  /  TBili  0.6  /  DBili  x   /  AST  38  /  ALT  93[H]  /  AlkPhos  69  01-09    PTT - ( 10 Dameon 2025 04:54 )  PTT:67.8 sec        12 Lead ECG:   Ventricular Rate 74 BPM    Atrial Rate 277 BPM    QRS Duration 154 ms    Q-T Interval 492 ms    QTC Calculation(Bazett) 546 ms    R Axis 246 degrees    T Axis 71 degrees    Diagnosis Line Ventricular-paced rhythm  Biventricular pacemaker detected  Abnormal ECG    Confirmed by JESSICA TIAN MD ([id number]) on 1/10/2025 7:03:16 AM (01-09-25 @ 22:44)  12 Lead ECG:   Ventricular Rate 95 BPM    Atrial Rate 104 BPM    QRS Duration 154 ms    Q-T Interval 464 ms    QTC Calculation(Bazett) 583 ms    P Axis 85 degrees    R Axis 240 degrees    T Axis 81 degrees    Diagnosis Line Ventricular-paced rhythm  Abnormal ECG    Confirmed by Suleman Bhatt ([id number]) on 1/8/2025 10:28:10 AM (01-08-25 @ 09:48)      RADIOLOGY & ADDITIONAL TESTS:       INTERVAL HPI/OVERNIGHT EVENTS:  transferred from [facility] in [place]  reported being agitated on 1:1 observation  however AAO x3, answers all questions apparently       MEDICATIONS  (STANDING):  aMIOdarone    Tablet 200 milliGRAM(s) Oral daily  apixaban 5 milliGRAM(s) Oral every 12 hours  atorvastatin 40 milliGRAM(s) Oral at bedtime  chlorhexidine 2% Cloths 1 Application(s) Topical <User Schedule>  clopidogrel Tablet 75 milliGRAM(s) Oral daily  dextrose 5%. 1000 milliLiter(s) (50 mL/Hr) IV Continuous <Continuous>  dextrose 50% Injectable 25 Gram(s) IV Push once  dextrose 50% Injectable 12.5 Gram(s) IV Push once  dextrose 50% Injectable 25 Gram(s) IV Push once  ezetimibe 10 milliGRAM(s) Oral daily  fenofibrate Tablet 145 milliGRAM(s) Oral daily  glucagon  Injectable 1 milliGRAM(s) IntraMuscular once  insulin glargine Injectable (LANTUS) 10 Unit(s) SubCutaneous at bedtime  insulin lispro (ADMELOG) corrective regimen sliding scale   SubCutaneous three times a day before meals  metoprolol tartrate 75 milliGRAM(s) Oral every 6 hours  polyethylene glycol 3350 17 Gram(s) Oral two times a day  senna 2 Tablet(s) Oral at bedtime    MEDICATIONS  (PRN):  albuterol/ipratropium for Nebulization 3 milliLiter(s) Nebulizer every 6 hours PRN Shortness of Breath and/or Wheezing  dextrose Oral Gel 15 Gram(s) Oral once PRN Blood Glucose LESS THAN 70 milliGRAM(s)/deciliter  melatonin 3 milliGRAM(s) Oral at bedtime PRN Insomnia      Allergies    sulfa drugs (Unknown)    Intolerances        REVIEW OF SYSTEMS    [ ] A ten-point review of systems was otherwise negative except as noted.  [ ] Due to altered mental status/intubation, subjective information were not able to be obtained from the patient. History was obtained, to the extent possible, from review of the chart and collateral sources of information.      Vital Signs Last 24 Hrs  T(C): 37.2 (10 Dameon 2025 07:14), Max: 37.3 (10 Dameon 2025 06:41)  T(F): 99 (10 Dameon 2025 07:14), Max: 99.2 (10 Dameon 2025 06:41)  HR: 108 (10 Dameon 2025 11:38) (79 - 116)  BP: 133/75 (10 Dameon 2025 11:38) (128/65 - 141/85)  BP(mean): 93 (10 Dameon 2025 11:38) (88 - 110)  RR: 20 (10 Dameon 2025 11:38) (15 - 22)  SpO2: 95% (10 Dameon 2025 11:38) (94% - 100%)    Parameters below as of 10 Dameon 2025 07:14  Patient On (Oxygen Delivery Method): nasal cannula  O2 Flow (L/min): 4      01-09-25 @ 07:01  -  01-10-25 @ 07:00  --------------------------------------------------------  IN: 1279 mL / OUT: 1801 mL / NET: -522 mL    01-10-25 @ 07:01  -  01-10-25 @ 13:46  --------------------------------------------------------  IN: 52 mL / OUT: 1100 mL / NET: -1048 mL        PHYSICAL EXAM:    GENERAL: In no apparent distress, well nourished, and hydrated.  HEART: Irregular rate and rhythm; No murmur; NO rubs, or gallops.  PULMONARY: Clear to auscultation and percussion.  Normal expansion/effort. No rales, wheezing, or rhonchi bilaterally.  ABDOMEN: Soft  EXTREMITIES:  Extremities warm, pink, well-perfused, 2+ Peripheral Pulses, No clubbing, cyanosis, or edema  NEUROLOGICAL: lethargic    LABS:                        13.7   20.11 )-----------( 396      ( 10 Dameon 2025 05:31 )             42.7     01-10    140  |  101  |  30[H]  ----------------------------<  260[H]  4.0   |  30  |  1.6[H]    Ca    9.1      10 Dameon 2025 05:31  Phos  1.7     01-09  Mg     2.0     01-09    TPro  5.4[L]  /  Alb  3.0[L]  /  TBili  0.6  /  DBili  x   /  AST  38  /  ALT  93[H]  /  AlkPhos  69  01-09    PTT - ( 10 Dameon 2025 04:54 )  PTT:67.8 sec        12 Lead ECG:   Ventricular Rate 74 BPM    Atrial Rate 277 BPM    QRS Duration 154 ms    Q-T Interval 492 ms    QTC Calculation(Bazett) 546 ms    R Axis 246 degrees    T Axis 71 degrees    Diagnosis Line Ventricular-paced rhythm  Biventricular pacemaker detected  Abnormal ECG    Confirmed by JESSICA TIAN MD ([id number]) on 1/10/2025 7:03:16 AM (01-09-25 @ 22:44)  12 Lead ECG:   Ventricular Rate 95 BPM    Atrial Rate 104 BPM    QRS Duration 154 ms    Q-T Interval 464 ms    QTC Calculation(Bazett) 583 ms    P Axis 85 degrees    R Axis 240 degrees    T Axis 81 degrees    Diagnosis Line Ventricular-paced rhythm  Abnormal ECG    Confirmed by Suleman Bhatt ([id number]) on 1/8/2025 10:28:10 AM (01-08-25 @ 09:48)      RADIOLOGY & ADDITIONAL TESTS:

## 2025-01-10 NOTE — PROGRESS NOTE ADULT - NS ATTEND AMEND GEN_ALL_CORE FT
69yoM with iCM with LVEF 40-45% s/p CRT-D, CAD with h/o PCI, porcine AVR, pAF (with history of inappropriate shocks s/p ablation), HTN, HLD, DM, COPD (not on home O2), and active tobacco use who was transferred from [facility] for Afib ablation, but found to be confused and with undifferentiated hypoxia.     Patient presented to [facility] on 12/26 but left AMA. He represented on 12/27 and was admitted for acute-on-chronic HFrEF. He was undergoing IV diuresis, but experienced flash pulmonary edema on 12/31 requiring intubation. Rhythm was Afib with RVR at the time. It is unclear if the rapid Afib caused pulmonary edema or if the patient developed worsening hypoxia which caused his HR to be elevated. While in the ICU, patient received Bumex 2 mg IV daily with robust UOP and prerenal KAREN. He was also treated with a 7-day course of abx for pneumonia. Patient self-extubated on 1/04 and was transitioned to EVAPS/BiPAP and eventually NC. Post extubation, he was confused and agitated requiring a Precedex gtt on 1/07 and 1/08.     Yesterday, patient was receiving Bumex 1 mg IV daily and I/Os report net negative 500cc. However, despite his diuresis, his CXR has bilateral fluffy opacities today. T up to 99.2 and WBC above 20. His lung findings may be aspiration pneumonitis in the setting of confusion, worsening pulmonary edema despite ongoing diuresis and RAP 3, and/or pneumonia. Will attempt to optimize for Afib ablation on Tuesday, though patient appears too delirious and ill.     Plan:   - Lasix 40 mg IV x 1  - Will assess need for diuretics daily  - Will carefully measure UOP today on this dose and then remove Mcmahon, as patient has been pulling at it  - 1:1  - Discontinue Ambien  - Seroquel 12.5 mg nightly for delirium  - Send procalcitonin  - Consult ID  - Discontinue heparin gtt  - Resume home Eliquis 5 mg BID  - Continue home Plavix 75 mg daily, atorvastatin 40 mg nightly, Zetia 10 mg daily, and fenofibrate 145 mg daily   - On amiodarone 200 mg daily and Lopressor 75 mg q6h 69yoM with iCM with LVEF 40-45% s/p CRT-D (2020), CAD with h/o PCI, porcine AVR, pAF (ablation in 4/2024, recently started on amiodarone for inappropriate shocks due to RVR therefore started on amiodarone), HTN, HLD, DM, COPD (not on home O2), and active tobacco use who was transferred from [facility] for Afib ablation, but found to be confused and with undifferentiated hypoxia.     Patient presented to [facility] on 12/26 but left AMA. He represented on 12/27 and was admitted for acute-on-chronic HFrEF. He was undergoing IV diuresis, but experienced flash pulmonary edema on 12/31 requiring intubation. Rhythm was Afib with RVR at the time. It is unclear if the rapid Afib caused pulmonary edema or if the patient developed worsening hypoxia which caused his HR to be elevated. While in the ICU, patient received Bumex 2 mg IV daily with robust UOP and prerenal KAREN. He was also treated with a 7-day course of abx for pneumonia. Patient self-extubated on 1/04 and was transitioned to EVAPS/BiPAP and eventually NC. Post extubation, he was confused and agitated requiring a Precedex gtt on 1/07 and 1/08.     Yesterday, patient was receiving Bumex 1 mg IV daily and I/Os report net negative 500cc. However, despite his diuresis, his CXR has bilateral fluffy opacities today. T up to 99.2 and WBC above 20. His lung findings may be aspiration pneumonitis in the setting of confusion, worsening pulmonary edema despite ongoing diuresis and RAP 3, and/or pneumonia. Will attempt to optimize for Afib ablation on Tuesday, though patient appears too delirious and ill.     Plan:   - Lasix 40 mg IV x 1  - Will assess need for diuretics daily  - Will carefully measure UOP today on this dose and then remove Mcmahon, as patient has been pulling at it  - 1:1  - Discontinue Ambien  - Seroquel 12.5 mg nightly for delirium  - Send procalcitonin  - Consult ID  - Discontinue heparin gtt  - Resume home Eliquis 5 mg BID  - Continue home Plavix 75 mg daily, atorvastatin 40 mg nightly, Zetia 10 mg daily, and fenofibrate 145 mg daily   - On amiodarone 200 mg daily and Lopressor 75 mg q6h

## 2025-01-10 NOTE — PROGRESS NOTE ADULT - ASSESSMENT
69-year-old male with past medical history of chronic HFrEF s/p ICD, AVR porcine, HTN, PAF s/p ablation, CAD s/p PCI, DM, HLD, COPD (no home O2), smoker presenting with worsening shortness of breath and weakness with concern for acute on chronic CHF exacerbation in the setting of persistent afib    PLAN:    #Altered mental status   #toxic metabolic encephalopathy  While in [facility] , CT head negative   - start on seroquel 12.5mg nightly   - initiate delirum precaution  - maintain constant observation   - fall precaution   -  obtain daily ECG to monitor qtc  -avoid further qtc prolonging agents  - keep NPO except meds until  evaluated by Speech pathology     # AFIB/aflutter  #Hx ICM s/p CRT-D  #Hx Bioprosthetic AV  - c/w amiodarone 200mg daily  , metoprolol 75mg q 6hrs   - possible Ablation by EP on tuesday 1/14th  - - heparin gtt switched to eliquis 5mg BID       #Acute Hypoxemic Respiratory Failure due to pulmonary edema vs ? resp. infection   #Hx COPD   - CXR with worsening effusion &  opacity, ProBNP [id number]   - continue to wean off O2   - Lasix 40mg IV x1.   - repeat BMP in PM and if creatinine stable or downtrending. Give additional lasix 40mg IV  - obtain RVP   - With elevated WBC >> appreciate ID recs>>- if WBC worsening tomorrow, can restart cefepime 2g q 12 hours per ID   - follow-up procalcitonin     #KAREN   - trend renal functions  - discontinue iyer. f/u TOV   - Avoid nephrotoxic meds   -Monitor I & O     # Transaminitis ( LFTS improving) .  - sp GI consult while in [facility]   likely due to CHF  congestive hepatopathy   -monitor LFTs  -acute hepatitis panel WNL  -avoid hepatotoxic medications  -optimize cardiac status      Hx CVA  - c/w Plavix       Hx DM   - F.S ACHS   - c/w ISS   - Lantus 10 units QHS     #HLD  - C/w zetia 10mg daily   - c/w fenofibrate 145mg po daily , lipitor 40mg daily

## 2025-01-10 NOTE — SWALLOW BEDSIDE ASSESSMENT ADULT - SWALLOW EVAL: PATIENT/FAMILY GOALS STATEMENT
RN reports Pt was altered overnight, given Ativan and now with persistent lethargy but tolerance for oral meds

## 2025-01-10 NOTE — SWALLOW BEDSIDE ASSESSMENT ADULT - COMMENTS
s/p Ativan 11pm 1/9   Acute Hypoxemic Respiratory Failure due to pulmonary edema/acute on chronic HFmrEF   toxic metabolic encephalopathy s/p Ativan 11pm 1/9. +elevated WBC   R/o Acute Hypoxemic Respiratory Failure due to pulmonary edema/acute on chronic HFmrEF   toxic metabolic encephalopathy s/p Ativan 11pm 1/9. +elevated WBC   R/o Acute Hypoxemic Respiratory Failure due to pulmonary edema/acute on chronic HFmrEF   toxic metabolic encephalopathy    Pt is known to [facility], seen 1/8/24 with recs for soft & bite sized w/thins

## 2025-01-10 NOTE — CONSULT NOTE ADULT - ASSESSMENT
ASSESSMENT  This is a 69-year-old male with past medical history of chronic HFrEF s/p ICD, AVR porcine, HTN, PAF s/p ablation, CAD s/p PCI, DM, HLD, COPD (no home O2), smoker presenting with worsening shortness of breath and weakness.  Patient was admitted within the last 24 hours for acute on chronic CHF exacerbation, was given IV Lasix and BiPAP, and left AMA.  Patient returns stating that he became more short of breath last night after leaving the hospital     IMPRESSION  #Acute hypoxic respiratory failure - intubation s/p seld extubation 1/4  #V tach/CHF Exacerbation   #Fevers from 12/31-1/4 - Treated with course of cefepime 12/31-1/8 for possible pnuemonia     #Leukocytosis   #s/p Aortic Bioprosthetic valve  #Heart failure with reduced EF, +Pacemaker  #KAREN-Improved.   #Transaminitis- Possibly from hypotension/shock-Resolved.   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    RECOMMENDATIONS  This is a preliminary incomplete pended note, all final recommendations to follow after interview and examination of the patient.    Please call or message on [employer] Teams if with any questions.  Mipxsca [phone]   ASSESSMENT  This is a 69-year-old male with past medical history of chronic HFrEF s/p ICD, AVR porcine, HTN, PAF s/p ablation, CAD s/p PCI, DM, HLD, COPD (no home O2), smoker presenting with worsening shortness of breath and weakness.  Patient was admitted within the last 24 hours for acute on chronic CHF exacerbation, was given IV Lasix and BiPAP, and left AMA.  Patient returns stating that he became more short of breath last night after leaving the hospital     IMPRESSION  #Acute hypoxic respiratory failure - intubation s/p seld extubation 1/4  #V tach/CHF Exacerbation   #Fevers from 12/31-1/4 - Treated with course of cefepime 12/31-1/8 for possible pnuemonia     #Leukocytosis   #s/p Aortic Bioprosthetic valve  #Heart failure with reduced EF, +Pacemaker  #KAREN-Improved.   #Transaminitis- Possibly from hypotension/shock-Resolved.   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    RECOMMENDATIONS  - CXR 1/20 reviewed -- there is worsening bilateral opacities; received bumex on 1/9 (held temporarily on 1/8) -- no other clear localizing sypmtoms; he completed a course of cefepime recently   - planned for diuresis today -- if WBC worsening tomorrow, can restart cefepime 2g q 12 hours   - follow-up procalcitonin     Please call or message on [employer] Teams if with any questions.  Spectra [phone]

## 2025-01-10 NOTE — CONSULT NOTE ADULT - SUBJECTIVE AND OBJECTIVE BOX
SERENAMONIQUE  69y, Male  Allergy: sulfa drugs (Unknown)      CHIEF COMPLAINT: CHF exacerbation (09 Jan 2025 13:59)      LOS  14d    HPI:  This is a 69-year-old male with past medical history of chronic HFrEF s/p ICD, AVR porcine, HTN, PAF s/p ablation, CAD s/p PCI, DM, HLD, COPD (no home O2), smoker presenting with worsening shortness of breath and weakness.  Patient was admitted within the last 24 hours for acute on chronic CHF exacerbation, was given IV Lasix and BiPAP, and left AMA.  Patient returns stating that he became more short of breath last night after leaving the hospital and would like to be readmitted for treatment.  Denies fever/chills, chest pain, abdominal pain, calf pain, N/V/D/C, cough, and nasal congestion.      (27 Dec 2024 10:46)      INFECTIOUS DISEASE HISTORY:  History as above.  Transferred to  for abalation   At [facility], was undergoing diuresis for pulmonary edema   Completed coures of ceftriaxone from 12/27-1/30,  cefepime 12/31-1/8, and azithromycin   ID consulted for worsening leukocytosis     PAST MEDICAL & SURGICAL HISTORY:  CHF (congestive heart failure)      Diabetes      CAD (coronary artery disease)      Chronic obstructive pulmonary disease (COPD)      HTN (hypertension)      Stented coronary artery      Dyslipidemia      GERD (gastroesophageal reflux disease)      Afib      CVA (cerebral vascular accident)      H/O heart artery stent      Heart valve replaced  aortic      History of Nissen fundoplication          FAMILY HISTORY  Family history reviewed and non-contributory      SOCIAL HISTORY  Social History:  Reports last smoked tobacco 3 weeks ago. Denies alcohol and illicit drug use. Ambulates with a cane at baseline. (27 Dec 2024 10:46)        ROS  General: Denies rigors, nightsweats  HEENT: Denies headache, rhinorrhea, sore throat, eye pain  CV: Denies CP, palpitations  PULM: Denies wheezing, hemoptysis  GI: Denies hematemesis, hematochezia, melena  : Denies discharge, hematuria  MSK: Denies arthralgias, myalgias  SKIN: Denies rash, lesions  NEURO: Denies paresthesias, weakness  PSYCH: Denies depression, anxiety    VITALS:  T(F): 99, Max: 99.2 (01-10-25 @ 06:41)  HR: 95  BP: 128/65  RR: 20Vital Signs Last 24 Hrs  T(C): 37.2 (10 Dameon 2025 07:14), Max: 37.3 (10 Dameon 2025 06:41)  T(F): 99 (10 Dameon 2025 07:14), Max: 99.2 (10 Dameon 2025 06:41)  HR: 95 (10 Dameon 2025 07:14) (79 - 116)  BP: 128/65 (10 Dameon 2025 07:14) (128/65 - 141/85)  BP(mean): 88 (10 Dameon 2025 07:14) (88 - 110)  RR: 20 (10 Dameon 2025 07:14) (15 - 22)  SpO2: 94% (10 Dameon 2025 07:14) (94% - 100%)    Parameters below as of 10 Dameon 2025 07:14  Patient On (Oxygen Delivery Method): nasal cannula  O2 Flow (L/min): 4      PHYSICAL EXAM:  Gen: NAD, resting in bed  HEENT: Normocephalic, atraumatic  Neck: supple, no lymphadenopathy  CV: Regular rate & regular rhythm  Lungs: decreased BS at bases, no fremitus  Abdomen: Soft, BS present  Ext: Warm, well perfused  Neuro: non focal, awake  Skin: no rash, no erythema  Lines: no phlebitis    TESTS & MEASUREMENTS:                        13.7   20.11 )-----------( 396      ( 10 Dameon 2025 05:31 )             42.7     01-10    140  |  101  |  30[H]  ----------------------------<  260[H]  4.0   |  30  |  1.6[H]    Ca    9.1      10 Dameon 2025 05:31  Phos  1.7     01-09  Mg     2.0     01-09    TPro  5.4[L]  /  Alb  3.0[L]  /  TBili  0.6  /  DBili  x   /  AST  38  /  ALT  93[H]  /  AlkPhos  69  01-09      LIVER FUNCTIONS - ( 09 Jan 2025 05:36 )  Alb: 3.0 g/dL / Pro: 5.4 g/dL / ALK PHOS: 69 U/L / ALT: 93 U/L / AST: 38 U/L / GGT: x           Urinalysis Basic - ( 10 Dameon 2025 05:31 )    Color: x / Appearance: x / SG: x / pH: x  Gluc: 260 mg/dL / Ketone: x  / Bili: x / Urobili: x   Blood: x / Protein: x / Nitrite: x   Leuk Esterase: x / RBC: x / WBC x   Sq Epi: x / Non Sq Epi: x / Bacteria: x        Culture - Blood (collected 12-31-24 @ 15:45)  Source: .Blood BLOOD  Final Report (01-06-25 @ 02:00):    No growth at 5 days        Lactate, Blood: 1.5 mmol/L (01-10-25 @ 05:31)      INFECTIOUS DISEASES TESTING  Hepatitis B Surface Antigen: Nonreact (01-04-25 @ 05:58)  Hepatitis C Virus Interpretation: Nonreact (01-04-25 @ 05:58)  HIV-1/2 Combo Result: Nonreact (01-04-25 @ 05:58)  Procalcitonin: 0.68 ng/mL (01-03-25 @ 05:34)  MRSA PCR Result.: Negative (01-02-25 @ 20:30)  MRSA PCR Result.: Negative (01-02-25 @ 05:00)  Procalcitonin: 0.04 ng/mL (12-27-24 @ 12:18)      RADIOLOGY & ADDITIONAL TESTS:  I have personally reviewed the last Chest xray  CXR  Xray Chest 1 View- PORTABLE-Urgent:   ACC: [id number] EXAM:  XR CHEST PORTABLE URGENT 1V   ORDERED BY: MIKE ADAMS     PROCEDURE DATE:  01/10/2025          INTERPRETATION:  CLINICAL HISTORY / REASON FOR EXAM: Shortness of breath.    COMPARISON: Chest radiograph from January 9, 2025.    TECHNIQUE/POSITIONING: Satisfactory. Single image, AP chest radiograph.    FINDINGS:    SUPPORT DEVICES: ICD implant overlies the left hemithorax.    CARDIAC/MEDIASTINUM/HILUM: Post sternotomy.    LUNG PARENCHYMA/PLEURA: Increasing bilateral opacities/effusions.    SKELETON/SOFT TISSUES: Unchanged.      IMPRESSION:    Increased bilateral opacities/effusions.    --- End of Report ---            ZOFIA MOHAMUD MD; Attending Radiologist  This document has been electronically signed. Dameon 10 2025  6:56AM (01-10-25 @ 06:56)      CT      CARDIOLOGY TESTING  12 Lead ECG:   Ventricular Rate 74 BPM    Atrial Rate 277 BPM    QRS Duration 154 ms    Q-T Interval 492 ms    QTC Calculation(Bazett) 546 ms    R Axis 246 degrees    T Axis 71 degrees    Diagnosis Line Ventricular-paced rhythm  Biventricular pacemaker detected  Abnormal ECG    Confirmed by JESSICA TIAN MD ([id number]) on 1/10/2025 7:03:16 AM (01-09-25 @ 22:44)  12 Lead ECG:   Ventricular Rate 95 BPM    Atrial Rate 104 BPM    QRS Duration 154 ms    Q-T Interval 464 ms    QTC Calculation(Bazett) 583 ms    P Axis 85 degrees    R Axis 240 degrees    T Axis 81 degrees    Diagnosis Line Ventricular-paced rhythm  Abnormal ECG    Confirmed by Suleman Bhatt ([id number]) on 1/8/2025 10:28:10 AM (01-08-25 @ 09:48)      MEDICATIONS  aMIOdarone    Tablet 200 Oral daily  apixaban 5 Oral every 12 hours  atorvastatin 40 Oral at bedtime  chlorhexidine 2% Cloths 1 Topical <User Schedule>  clopidogrel Tablet 75 Oral daily  dextrose 5%. 1000 IV Continuous <Continuous>  dextrose 50% Injectable 25 IV Push once  dextrose 50% Injectable 12.5 IV Push once  dextrose 50% Injectable 25 IV Push once  ezetimibe 10 Oral daily  fenofibrate Tablet 145 Oral daily  glucagon  Injectable 1 IntraMuscular once  insulin glargine Injectable (LANTUS) 10 SubCutaneous at bedtime  insulin lispro (ADMELOG) corrective regimen sliding scale  SubCutaneous three times a day before meals  metoprolol tartrate 75 Oral every 6 hours  polyethylene glycol 3350 17 Oral two times a day  potassium phosphate / sodium phosphate Powder (PHOS-NaK) 1 Oral three times a day before meals  senna 2 Oral at bedtime      Weight  Weight (kg): 82.1 (12-27-24 @ 16:33)    ANTIBIOTICS:      ALLERGIES:  sulfa drugs (Unknown)       SERENAMONIQUE  69y, Male  Allergy: sulfa drugs (Unknown)      CHIEF COMPLAINT: CHF exacerbation (09 Jan 2025 13:59)      LOS  14d    HPI:  This is a 69-year-old male with past medical history of chronic HFrEF s/p ICD, AVR porcine, HTN, PAF s/p ablation, CAD s/p PCI, DM, HLD, COPD (no home O2), smoker presenting with worsening shortness of breath and weakness.  Patient was admitted within the last 24 hours for acute on chronic CHF exacerbation, was given IV Lasix and BiPAP, and left AMA.  Patient returns stating that he became more short of breath last night after leaving the hospital and would like to be readmitted for treatment.  Denies fever/chills, chest pain, abdominal pain, calf pain, N/V/D/C, cough, and nasal congestion.      (27 Dec 2024 10:46)      INFECTIOUS DISEASE HISTORY:  History as above.  Transferred to  for abalation   At [facility], was undergoing diuresis for pulmonary edema   Completed coures of ceftriaxone from 12/27-1/30,  cefepime 12/31-1/8, and azithromycin   ID consulted for worsening leukocytosis   Limited historian.   He is oriented to person, year -- unaware that he was transferred from [facility].   Does not open eyes.   Denies abdominal pain, nausea, vomiting, dysuria.   On Nasal Canula, denies any worsening shortness of breath.     PAST MEDICAL & SURGICAL HISTORY:  CHF (congestive heart failure)      Diabetes      CAD (coronary artery disease)      Chronic obstructive pulmonary disease (COPD)      HTN (hypertension)      Stented coronary artery      Dyslipidemia      GERD (gastroesophageal reflux disease)      Afib      CVA (cerebral vascular accident)      H/O heart artery stent      Heart valve replaced  aortic      History of Nissen fundoplication          FAMILY HISTORY  Family history reviewed and non-contributory      SOCIAL HISTORY  Social History:  Reports last smoked tobacco 3 weeks ago. Denies alcohol and illicit drug use. Ambulates with a cane at baseline. (27 Dec 2024 10:46)        ROS  General: Denies rigors, nightsweats  HEENT: Denies headache, rhinorrhea, sore throat, eye pain  CV: Denies CP, palpitations  PULM: Denies wheezing, hemoptysis  GI: Denies hematemesis, hematochezia, melena  : Denies discharge, hematuria  MSK: Denies arthralgias, myalgias  SKIN: Denies rash, lesions  NEURO: Denies paresthesias, weakness  PSYCH: Denies depression, anxiety    VITALS:  T(F): 99, Max: 99.2 (01-10-25 @ 06:41)  HR: 95  BP: 128/65  RR: 20Vital Signs Last 24 Hrs  T(C): 37.2 (10 Dameon 2025 07:14), Max: 37.3 (10 Dameon 2025 06:41)  T(F): 99 (10 Dameon 2025 07:14), Max: 99.2 (10 Dameon 2025 06:41)  HR: 95 (10 Dameon 2025 07:14) (79 - 116)  BP: 128/65 (10 Dameon 2025 07:14) (128/65 - 141/85)  BP(mean): 88 (10 Dameon 2025 07:14) (88 - 110)  RR: 20 (10 Dameon 2025 07:14) (15 - 22)  SpO2: 94% (10 Dameon 2025 07:14) (94% - 100%)    Parameters below as of 10 Dameon 2025 07:14  Patient On (Oxygen Delivery Method): nasal cannula  O2 Flow (L/min): 4      PHYSICAL EXAM:  Gen: NAD, resting in bed  HEENT: Normocephalic, atraumatic  Neck: supple, no lymphadenopathy  CV: Regular rate & regular rhythm  Lungs: decreased BS at bases, no fremitus  Abdomen: Soft, BS present  Ext: Warm, well perfused  Neuro: non focal, awake  Skin: no rash, no erythema  Lines: no phlebitis    TESTS & MEASUREMENTS:                        13.7   20.11 )-----------( 396      ( 10 Dameon 2025 05:31 )             42.7     01-10    140  |  101  |  30[H]  ----------------------------<  260[H]  4.0   |  30  |  1.6[H]    Ca    9.1      10 Dameon 2025 05:31  Phos  1.7     01-09  Mg     2.0     01-09    TPro  5.4[L]  /  Alb  3.0[L]  /  TBili  0.6  /  DBili  x   /  AST  38  /  ALT  93[H]  /  AlkPhos  69  01-09      LIVER FUNCTIONS - ( 09 Jan 2025 05:36 )  Alb: 3.0 g/dL / Pro: 5.4 g/dL / ALK PHOS: 69 U/L / ALT: 93 U/L / AST: 38 U/L / GGT: x           Urinalysis Basic - ( 10 Dameon 2025 05:31 )    Color: x / Appearance: x / SG: x / pH: x  Gluc: 260 mg/dL / Ketone: x  / Bili: x / Urobili: x   Blood: x / Protein: x / Nitrite: x   Leuk Esterase: x / RBC: x / WBC x   Sq Epi: x / Non Sq Epi: x / Bacteria: x        Culture - Blood (collected 12-31-24 @ 15:45)  Source: .Blood BLOOD  Final Report (01-06-25 @ 02:00):    No growth at 5 days        Lactate, Blood: 1.5 mmol/L (01-10-25 @ 05:31)      INFECTIOUS DISEASES TESTING  Hepatitis B Surface Antigen: Nonreact (01-04-25 @ 05:58)  Hepatitis C Virus Interpretation: Nonreact (01-04-25 @ 05:58)  HIV-1/2 Combo Result: Nonreact (01-04-25 @ 05:58)  Procalcitonin: 0.68 ng/mL (01-03-25 @ 05:34)  MRSA PCR Result.: Negative (01-02-25 @ 20:30)  MRSA PCR Result.: Negative (01-02-25 @ 05:00)  Procalcitonin: 0.04 ng/mL (12-27-24 @ 12:18)      RADIOLOGY & ADDITIONAL TESTS:  I have personally reviewed the last Chest xray  CXR  Xray Chest 1 View- PORTABLE-Urgent:   ACC: [id number] EXAM:  XR CHEST PORTABLE URGENT 1V   ORDERED BY: MIKE ADAMS     PROCEDURE DATE:  01/10/2025          INTERPRETATION:  CLINICAL HISTORY / REASON FOR EXAM: Shortness of breath.    COMPARISON: Chest radiograph from January 9, 2025.    TECHNIQUE/POSITIONING: Satisfactory. Single image, AP chest radiograph.    FINDINGS:    SUPPORT DEVICES: ICD implant overlies the left hemithorax.    CARDIAC/MEDIASTINUM/HILUM: Post sternotomy.    LUNG PARENCHYMA/PLEURA: Increasing bilateral opacities/effusions.    SKELETON/SOFT TISSUES: Unchanged.      IMPRESSION:    Increased bilateral opacities/effusions.    --- End of Report ---            ZOFIA MOHAMUD MD; Attending Radiologist  This document has been electronically signed. Dameon 10 2025  6:56AM (01-10-25 @ 06:56)      CT      CARDIOLOGY TESTING  12 Lead ECG:   Ventricular Rate 74 BPM    Atrial Rate 277 BPM    QRS Duration 154 ms    Q-T Interval 492 ms    QTC Calculation(Bazett) 546 ms    R Axis 246 degrees    T Axis 71 degrees    Diagnosis Line Ventricular-paced rhythm  Biventricular pacemaker detected  Abnormal ECG    Confirmed by JESSICA TIAN MD ([id number]) on 1/10/2025 7:03:16 AM (01-09-25 @ 22:44)  12 Lead ECG:   Ventricular Rate 95 BPM    Atrial Rate 104 BPM    QRS Duration 154 ms    Q-T Interval 464 ms    QTC Calculation(Bazett) 583 ms    P Axis 85 degrees    R Axis 240 degrees    T Axis 81 degrees    Diagnosis Line Ventricular-paced rhythm  Abnormal ECG    Confirmed by Suleman Bhatt ([id number]) on 1/8/2025 10:28:10 AM (01-08-25 @ 09:48)      MEDICATIONS  aMIOdarone    Tablet 200 Oral daily  apixaban 5 Oral every 12 hours  atorvastatin 40 Oral at bedtime  chlorhexidine 2% Cloths 1 Topical <User Schedule>  clopidogrel Tablet 75 Oral daily  dextrose 5%. 1000 IV Continuous <Continuous>  dextrose 50% Injectable 25 IV Push once  dextrose 50% Injectable 12.5 IV Push once  dextrose 50% Injectable 25 IV Push once  ezetimibe 10 Oral daily  fenofibrate Tablet 145 Oral daily  glucagon  Injectable 1 IntraMuscular once  insulin glargine Injectable (LANTUS) 10 SubCutaneous at bedtime  insulin lispro (ADMELOG) corrective regimen sliding scale  SubCutaneous three times a day before meals  metoprolol tartrate 75 Oral every 6 hours  polyethylene glycol 3350 17 Oral two times a day  potassium phosphate / sodium phosphate Powder (PHOS-NaK) 1 Oral three times a day before meals  senna 2 Oral at bedtime      Weight  Weight (kg): 82.1 (12-27-24 @ 16:33)    ANTIBIOTICS:      ALLERGIES:  sulfa drugs (Unknown)

## 2025-01-10 NOTE — PROGRESS NOTE ADULT - SUBJECTIVE AND OBJECTIVE BOX
Chief complaint: Patient is a 69y old  Male who presents with a chief complaint of CHF exacerbation (10 Dameon 2025 13:46)      HPI:  This is a 69-year-old male with past medical history of chronic HFrEF s/p ICD, AVR porcine, HTN, PAF s/p ablation, CAD s/p PCI, DM, HLD, COPD (no home O2), smoker presenting with worsening shortness of breath and weakness.  Patient was admitted within the last 24 hours for acute on chronic CHF exacerbation, was given IV Lasix and BiPAP, and left AMA.  Patient returns stating that he became more short of breath last night after leaving the hospital and would like to be readmitted for treatment.  Denies fever/chills, chest pain, abdominal pain, calf pain, N/V/D/C, cough, and nasal congestion.     [facility]/Tele/ICU  Course:   Patient admitted for acute on chronic systolic CHF vs Pneumonia. initial BNP 5K, leukocytosis, no fevers. CXR with bilateral lung opacities. Diuresed with Lasix 40mg, received IV ceftriaxone and azithromycin. TTE with EF 30-35%. On 12/31/24, a rapid response was called for tachycardia, chest pain, and hypoxia. Patient was noted to have multiple runs of Vtach on monitor. EKG with ventricular paced rhythm, O2 improved with supplemental oxygen, IV lasix and IV lopressor 5mg given, stabilized. Another rapid was called that same evening, with patient becoming more tachypneic, now hypoxic. CXR showed worsening fluid overload/flash pulmonary edema. Patient was becoming more somnolent and with increased labored breathing. [facility] discussions had with son Neto, patient remained full CODE. Decision was made to intubate patient and upgraded to ICU. Patient ultimately  self Extubated 1/4/2024 while doing spontaneous breathing trial. Remained off mechanical ventilation and was transitioned to bipap.  Finish course of cefepime 1/7/2025. After that monitor off abx. He was evaluated by EP who recommended transfer to [facility] for plan of ablation.       Interval history:  Arrived from  [facility] 1/9th overnight   OVN: Patient with periods of agitation and confusion Pulling on medical lines i:e iyer, IV.   Patient given ativan 2 mg IV x1, placed on CO still agitated and not cooperating; patient place on 2 point restraint.   Also noted to be hypoxic down 70's on 3 L NC. Non rebreather mask placed on O2 sats improved to 100%     Review of systems: a complete 10- point review of systems was obtained and is negative except as stated in the interval history.    Vitals:  T(F): 99, Max: 99.2 (01-10 @ 06:41)  HR: 108 (79 - 116)  BP: 133/75 (128/65 - 141/85)  RR: 20 (15 - 20)  SpO2: 95% (94% - 100%)    Ins & outs:     01-07 @ 07:01  -  01-08 @ 07:00  --------------------------------------------------------  IN: 632.6 mL / OUT: 2015 mL / NET: -1382.4 mL    01-08 @ 07:01 - 01-09 @ 07:00  --------------------------------------------------------  IN: 1282 mL / OUT: 1550 mL / NET: -268 mL    01-09 @ 07:01  -  01-10 @ 07:00  --------------------------------------------------------  IN: 1279 mL / OUT: 1801 mL / NET: -522 mL    01-10 @ 07:01  -  01-10 @ 16:24  --------------------------------------------------------  IN: 52 mL / OUT: 1200 mL / NET: -1148 mL      Weight trend:      Physical exam:  General: No apparent distress. periods of lethargy   HEENT: Anicteric sclera. Moist mucous membranes. no JVD noted   Cardiac: Regular rate and rhythm. No murmurs, rubs, or gallops.   Vascular: Symmetric radial pulses. Dorsalis pedis pulses palpable.   Respiratory: Normal effort. Bibasilar crackles  Abdomen: Soft, nontender. Audible bowel sounds.   Extremities: Warm with no edema. No cyanosis or clubbing.   Skin: Warm and dry. No rash.   Neurologic: Grossly normal motor function.   Psychiatric: oriented to self and place and periods of disorientation to situation     Data reviewed:  - Telemetry:  - ECG 1/9th)   (Ventricular Rate 74 BPM    Atrial Rate 277 BPM    QRS Duration 154 ms    Q-T Interval 492 ms    QTC Calculation(Bazett) 546 ms    R Axis 246 degrees    T Axis 71 degrees    Diagnosis Line Ventricular-paced rhythm  Biventricular pacemaker detected  Abnormal ECG      - Echo (12/27)   Summary:   1. Left ventricular ejection fraction, by visual estimation, is 40 to   45%.   2. Mildly enlarged left atrium.   3. Mild mitral annular calcification.   4. Mild mitral valve regurgitation.   5. Mild tricuspid regurgitation.   6. Bioprosthesis in the aortic position.   7. Ascending aorta 3.7 cm.   8. Estimated pulmonary artery systolic pressure is 35.7 mmHg assuming a   right atrial pressure of 3 mmHg, which is consistent with borderline   pulmonary hypertension.    - Radiology:    CXR:   IMPRESSION:    Increased bilateral opacities/effusions.      - Labs:                        13.7   20.11 )-----------( 396      ( 10 Dameon 2025 05:31 )             42.7     01-10    140  |  101  |  30[H]  ----------------------------<  260[H]  4.0   |  30  |  1.6[H]    Ca    9.1      10 Dameon 2025 05:31  Phos  1.7     01-09  Mg     2.0     01-09    TPro  5.4[L]  /  Alb  3.0[L]  /  TBili  0.6  /  DBili  x   /  AST  38  /  ALT  93[H]  /  AlkPhos  69  01-09    LIVER FUNCTIONS - ( 09 Jan 2025 05:36 )  Alb: 3.0 g/dL / Pro: 5.4 g/dL / ALK PHOS: 69 U/L / ALT: 93 U/L / AST: 38 U/L / GGT: x           PTT - ( 10 Dameon 2025 04:54 )  PTT:67.8 sec      Urinalysis Basic - ( 10 Dameon 2025 05:31 )    Color: x / Appearance: x / SG: x / pH: x  Gluc: 260 mg/dL / Ketone: x  / Bili: x / Urobili: x   Blood: x / Protein: x / Nitrite: x   Leuk Esterase: x / RBC: x / WBC x   Sq Epi: x / Non Sq Epi: x / Bacteria: x      - Cultures:    Medications:  aMIOdarone    Tablet 200 milliGRAM(s) Oral daily  apixaban 5 milliGRAM(s) Oral every 12 hours  atorvastatin 40 milliGRAM(s) Oral at bedtime  chlorhexidine 2% Cloths 1 Application(s) Topical <User Schedule>  clopidogrel Tablet 75 milliGRAM(s) Oral daily  dextrose 50% Injectable 25 Gram(s) IV Push once  dextrose 50% Injectable 12.5 Gram(s) IV Push once  dextrose 50% Injectable 25 Gram(s) IV Push once  ezetimibe 10 milliGRAM(s) Oral daily  fenofibrate Tablet 145 milliGRAM(s) Oral daily  glucagon  Injectable 1 milliGRAM(s) IntraMuscular once  insulin glargine Injectable (LANTUS) 10 Unit(s) SubCutaneous at bedtime  insulin lispro (ADMELOG) corrective regimen sliding scale   SubCutaneous three times a day before meals  metoprolol tartrate 75 milliGRAM(s) Oral every 6 hours  polyethylene glycol 3350 17 Gram(s) Oral two times a day  QUEtiapine 12.5 milliGRAM(s) Oral at bedtime  senna 2 Tablet(s) Oral at bedtime    Drips:  dextrose 5%. 1000 milliLiter(s) (50 mL/Hr) IV Continuous <Continuous>    PRN:       Assessment:      Problems discussed and associated plan:

## 2025-01-10 NOTE — CONSULT NOTE ADULT - PROVIDER SPECIALTY LIST ADULT
Cardiology
Critical Care
Gastroenterology
Electrophysiology
Nephrology
Infectious Disease
Infectious Disease

## 2025-01-10 NOTE — PROGRESS NOTE ADULT - NS ATTEND AMEND GEN_ALL_CORE FT
Rec  - Monitor on tele  - Cont diuresis  - Plan for ablation early next week if pt can be optimized.   - Cont Amio   - Daily ECG and LFTs  - Cont BB  - Maintain electrolytes K>4.0 Mg >2.0, monitor closely  - Cont Eliquis 5 mg PO BID

## 2025-01-10 NOTE — PROGRESS NOTE ADULT - ASSESSMENT
EP: Deandre    69-year-old male with past medical history of chronic HFrEF s/p ICD, AVR porcine, HTN, PAF s/p ablation, CAD s/p PCI, DM, HLD, COPD (no home O2), smoker presenting with worsening shortness of breath and weakness with concern for acute on chronic CHF exacerbation in the setting of persistent afib. 12/31 pt went into flash pulm edema requiring intubation. Now extubated and cleared for ablation.  was transferred to [place] for further care    Impression:  #Persistent AF  #ICM s/p CRT-D  #Bioprosthetic AV  #Acute CHF exacerbation      con't tele  con't diuresis  Maintain electrolytes K>4.0 Mg >2.0, monitor closely  Will plan ablation next week, tentatively Tue if patient is optimized   Continue metoprolol 75 Q 6, will change to XL on discharge  Continue amiodarone 200 mg q24h- discontinue if LFTs increase - currently downtrending  Con;t Eliquis     EP: Deandre    69-year-old male with past medical history of chronic HFrEF s/p ICD, AVR porcine, HTN, PAF s/p ablation, CAD s/p PCI, DM, HLD, COPD (no home O2), smoker presenting with worsening shortness of breath and weakness with concern for acute on chronic CHF exacerbation in the setting of persistent afib. 12/31 pt went into flash pulm edema requiring intubation. Now extubated and cleared for ablation.  was transferred to [place] for further care    Impression:  #Persistent AF  #ICM s/p CRT-D  #Bioprosthetic AV  #Acute CHF exacerbation      con't tele  con't diuresis  Maintain electrolytes K>4.0 Mg >2.0, monitor closely  Will plan ablation next week, tentatively Tue if patient is optimized   Continue metoprolol 75 Q 6, will change to XL on discharge  Continue amiodarone 200 mg q24h- discontinue if LFTs increase - currently downtrending  Con;t Eliquis 5 mg PO BID

## 2025-01-11 LAB
ALBUMIN SERPL ELPH-MCNC: 3.4 G/DL — LOW (ref 3.5–5.2)
ALP SERPL-CCNC: 76 U/L — SIGNIFICANT CHANGE UP (ref 30–115)
ALT FLD-CCNC: 56 U/L — HIGH (ref 0–41)
ANION GAP SERPL CALC-SCNC: 15 MMOL/L — HIGH (ref 7–14)
APTT BLD: 34.9 SEC — SIGNIFICANT CHANGE UP (ref 27–39.2)
AST SERPL-CCNC: 31 U/L — SIGNIFICANT CHANGE UP (ref 0–41)
BASOPHILS # BLD AUTO: 0.09 K/UL — SIGNIFICANT CHANGE UP (ref 0–0.2)
BASOPHILS NFR BLD AUTO: 0.6 % — SIGNIFICANT CHANGE UP (ref 0–1)
BILIRUB SERPL-MCNC: 0.8 MG/DL — SIGNIFICANT CHANGE UP (ref 0.2–1.2)
BUN SERPL-MCNC: 29 MG/DL — HIGH (ref 10–20)
CALCIUM SERPL-MCNC: 9.7 MG/DL — SIGNIFICANT CHANGE UP (ref 8.4–10.5)
CHLORIDE SERPL-SCNC: 101 MMOL/L — SIGNIFICANT CHANGE UP (ref 98–110)
CO2 SERPL-SCNC: 28 MMOL/L — SIGNIFICANT CHANGE UP (ref 17–32)
CREAT SERPL-MCNC: 1.6 MG/DL — HIGH (ref 0.7–1.5)
EGFR: 46 ML/MIN/1.73M2 — LOW
EOSINOPHIL # BLD AUTO: 0.23 K/UL — SIGNIFICANT CHANGE UP (ref 0–0.7)
EOSINOPHIL NFR BLD AUTO: 1.4 % — SIGNIFICANT CHANGE UP (ref 0–8)
GLUCOSE BLDC GLUCOMTR-MCNC: 148 MG/DL — HIGH (ref 70–99)
GLUCOSE BLDC GLUCOMTR-MCNC: 164 MG/DL — HIGH (ref 70–99)
GLUCOSE BLDC GLUCOMTR-MCNC: 306 MG/DL — HIGH (ref 70–99)
GLUCOSE SERPL-MCNC: 173 MG/DL — HIGH (ref 70–99)
HCT VFR BLD CALC: 47 % — SIGNIFICANT CHANGE UP (ref 42–52)
HGB BLD-MCNC: 14.8 G/DL — SIGNIFICANT CHANGE UP (ref 14–18)
IMM GRANULOCYTES NFR BLD AUTO: 1.6 % — HIGH (ref 0.1–0.3)
LYMPHOCYTES # BLD AUTO: 1.7 K/UL — SIGNIFICANT CHANGE UP (ref 1.2–3.4)
LYMPHOCYTES # BLD AUTO: 10.6 % — LOW (ref 20.5–51.1)
MAGNESIUM SERPL-MCNC: 2.1 MG/DL — SIGNIFICANT CHANGE UP (ref 1.8–2.4)
MCHC RBC-ENTMCNC: 29 PG — SIGNIFICANT CHANGE UP (ref 27–31)
MCHC RBC-ENTMCNC: 31.5 G/DL — LOW (ref 32–37)
MCV RBC AUTO: 92 FL — SIGNIFICANT CHANGE UP (ref 80–94)
MONOCYTES # BLD AUTO: 1.52 K/UL — HIGH (ref 0.1–0.6)
MONOCYTES NFR BLD AUTO: 9.5 % — HIGH (ref 1.7–9.3)
NEUTROPHILS # BLD AUTO: 12.27 K/UL — HIGH (ref 1.4–6.5)
NEUTROPHILS NFR BLD AUTO: 76.3 % — HIGH (ref 42.2–75.2)
NRBC # BLD: 0 /100 WBCS — SIGNIFICANT CHANGE UP (ref 0–0)
PLATELET # BLD AUTO: 343 K/UL — SIGNIFICANT CHANGE UP (ref 130–400)
PMV BLD: 12.6 FL — HIGH (ref 7.4–10.4)
POTASSIUM SERPL-MCNC: 4.5 MMOL/L — SIGNIFICANT CHANGE UP (ref 3.5–5)
POTASSIUM SERPL-SCNC: 4.5 MMOL/L — SIGNIFICANT CHANGE UP (ref 3.5–5)
PROT SERPL-MCNC: 6 G/DL — SIGNIFICANT CHANGE UP (ref 6–8)
RAPID RVP RESULT: SIGNIFICANT CHANGE UP
RBC # BLD: 5.11 M/UL — SIGNIFICANT CHANGE UP (ref 4.7–6.1)
RBC # FLD: 13.7 % — SIGNIFICANT CHANGE UP (ref 11.5–14.5)
SARS-COV-2 RNA SPEC QL NAA+PROBE: SIGNIFICANT CHANGE UP
SODIUM SERPL-SCNC: 144 MMOL/L — SIGNIFICANT CHANGE UP (ref 135–146)
WBC # BLD: 16.07 K/UL — HIGH (ref 4.8–10.8)
WBC # FLD AUTO: 16.07 K/UL — HIGH (ref 4.8–10.8)

## 2025-01-11 PROCEDURE — 99232 SBSQ HOSP IP/OBS MODERATE 35: CPT

## 2025-01-11 PROCEDURE — 71045 X-RAY EXAM CHEST 1 VIEW: CPT | Mod: 26

## 2025-01-11 RX ORDER — FUROSEMIDE 20 MG
40 TABLET ORAL ONCE
Refills: 0 | Status: COMPLETED | OUTPATIENT
Start: 2025-01-11 | End: 2025-01-11

## 2025-01-11 RX ADMIN — ATORVASTATIN CALCIUM 40 MILLIGRAM(S): 40 TABLET, FILM COATED ORAL at 23:01

## 2025-01-11 RX ADMIN — Medication 75 MILLIGRAM(S): at 23:55

## 2025-01-11 RX ADMIN — QUETIAPINE FUMARATE 12.5 MILLIGRAM(S): 100 TABLET, FILM COATED ORAL at 23:01

## 2025-01-11 RX ADMIN — FENOFIBRATE 145 MILLIGRAM(S): 48 TABLET ORAL at 11:40

## 2025-01-11 RX ADMIN — CHLORHEXIDINE GLUCONATE 1 APPLICATION(S): 1.2 RINSE ORAL at 06:03

## 2025-01-11 RX ADMIN — CLOPIDOGREL BISULFATE 75 MILLIGRAM(S): 75 TABLET, FILM COATED ORAL at 11:40

## 2025-01-11 RX ADMIN — APIXABAN 5 MILLIGRAM(S): 5 TABLET, FILM COATED ORAL at 06:02

## 2025-01-11 RX ADMIN — EZETIMIBE 10 MILLIGRAM(S): 10 TABLET ORAL at 11:40

## 2025-01-11 RX ADMIN — INSULIN GLARGINE-YFGN 10 UNIT(S): 100 INJECTION, SOLUTION SUBCUTANEOUS at 23:05

## 2025-01-11 RX ADMIN — APIXABAN 5 MILLIGRAM(S): 5 TABLET, FILM COATED ORAL at 17:05

## 2025-01-11 RX ADMIN — SENNOSIDES 2 TABLET(S): 8.6 TABLET, FILM COATED ORAL at 23:06

## 2025-01-11 RX ADMIN — Medication 75 MILLIGRAM(S): at 06:02

## 2025-01-11 RX ADMIN — AMIODARONE HYDROCHLORIDE 200 MILLIGRAM(S): 200 TABLET ORAL at 06:06

## 2025-01-11 RX ADMIN — Medication 40 MILLIGRAM(S): at 13:49

## 2025-01-11 RX ADMIN — Medication 17 GRAM(S): at 17:05

## 2025-01-11 RX ADMIN — Medication 75 MILLIGRAM(S): at 11:40

## 2025-01-11 RX ADMIN — Medication 75 MILLIGRAM(S): at 17:04

## 2025-01-11 NOTE — PROGRESS NOTE ADULT - NS ATTEND AMEND GEN_ALL_CORE FT
69yoM with iCM with LVEF 40-45% s/p CRT-D (2020), CAD with h/o PCI, porcine AVR, pAF (ablation in 4/2024, recently started on amiodarone for inappropriate shocks due to RVR therefore started on amiodarone), HTN, HLD, DM, COPD (not on home O2), and active tobacco use who was transferred from [facility] 1/10/25 for Afib ablation.     At [facility] patient was admitted for acute on chronic HFrEF, course complicated by flash pulmonary edema requiring intubation. Found to be in AFib with RVR. Diuresed and treated with 7-day course of antibiotics for pneumonia. Self-extubated 1/4/25 and transitioned from BiPAP to nasal cannula.     Diuresed 1/9/25 and 1/10/25 with good urine output but had fluffy opacities on 1/10 CXR.    Today, afebrile. WBC trending down (20->16). Procal 0.12. SpO2 98% on 4L NC.     Plan:  - CXR today  - Give additional Lasix 40 mg IV x1  - Will hold off on antibiotics at this time  - Continue Seroquel, no ambien  - Continue Eliquis 5 mg BID  - On amiodarone 200 mg daily and Lopressor 75 mg q6h  - Continue home Plavix 75 mg daily, atorvastatin 40 mg nightly, Zetia 10 mg daily, and fenofibrate 145 mg daily     Reema Card MD  Vascular Cardiology Attending  Please text or call via MS Teams with questions

## 2025-01-11 NOTE — PHYSICAL THERAPY INITIAL EVALUATION ADULT - BALANCE DISTURBANCE, IDENTIFIED IMPAIRMENT CONTRIBUTE, REHAB EVAL
c/o L foot pain/pain/decreased strength
impaired coordination/impaired postural control/decreased strength

## 2025-01-11 NOTE — PHYSICAL THERAPY INITIAL EVALUATION ADULT - PLANNED THERAPY INTERVENTIONS, PT EVAL
balance training/bed mobility training/gait training/strengthening/transfer training
balance training/bed mobility training/gait training/motor coordination training/postural re-education/strengthening/transfer training

## 2025-01-11 NOTE — SWALLOW BEDSIDE ASSESSMENT ADULT - SWALLOW EVAL: RECOMMENDED DIET
Soft & bite sized with thin liquids
NPO with non oral means for nutrition/ hydration
Soft & bite sized with thin liquids
Soft & bite sized with thin liquids

## 2025-01-11 NOTE — SWALLOW BEDSIDE ASSESSMENT ADULT - CONSISTENCIES ADMINISTERED
thin liquid/pureed/soft & bite-sized
thin liquid/soft & bite-sized
PO trials are not indicated at thi stime
thin liquid/soft & bite-sized

## 2025-01-11 NOTE — SWALLOW BEDSIDE ASSESSMENT ADULT - ADDITIONAL RECOMMENDATIONS
SLP d/c reconsult PRN
SLP d/c reconsult PRN
SLP will plan to f/u
SLP will f/u to ensure safe PO tolerance

## 2025-01-11 NOTE — SWALLOW BEDSIDE ASSESSMENT ADULT - NS ASR SWALLOW FINDINGS DISCUS
Physician/Nursing/Patient
RN Adilson 4t team aware/Physician/Nursing/Patient
RN MD Adilson made aware/Physician/Nursing
Physician/Nursing/Patient

## 2025-01-11 NOTE — PHYSICAL THERAPY INITIAL EVALUATION ADULT - PHYSICAL ASSIST/NONPHYSICAL ASSIST: BED TO CHAIR, REHAB EVAL
for encouragement and safety/verbal cues/2 person assist
supervision/nonverbal cues (demo/gestures)/2 person assist

## 2025-01-11 NOTE — PHYSICAL THERAPY INITIAL EVALUATION ADULT - IMPAIRMENTS FOUND, PT EVAL
aerobic capacity/endurance/arousal, attention, and cognition/gait, locomotion, and balance/gross motor/integumentary integrity/muscle strength/poor safety awareness/posture/ROM/ventilation and respiration/gas exchange
aerobic capacity/endurance/arousal, attention, and cognition/cognitive impairment/ergonomics and body mechanics/gait, locomotion, and balance/gross motor/joint integrity and mobility/muscle strength/poor safety awareness/posture

## 2025-01-11 NOTE — SWALLOW BEDSIDE ASSESSMENT ADULT - COMMENTS
Abdominal pressure applied. s/p Ativan 11pm 1/9. +elevated WBC   R/o Acute Hypoxemic Respiratory Failure due to pulmonary edema/acute on chronic HFmrEF   toxic metabolic encephalopathy    Pt is known to [facility], seen 1/8/24 with recs for soft & bite sized w/thins

## 2025-01-11 NOTE — PHYSICAL THERAPY INITIAL EVALUATION ADULT - IMPAIRED TRANSFERS: BED/CHAIR, REHAB EVAL
c/o L foot pain; decreased cardiopulmonary endurance/impaired balance/cognition/pain/decreased strength

## 2025-01-11 NOTE — SWALLOW BEDSIDE ASSESSMENT ADULT - ASR SWALLOW ASPIRATION MONITOR
change of breathing pattern/oral hygiene/position upright (90Y)/cough/gurgly voice/fever/pneumonia/throat clearing/upper respiratory infection
change of breathing pattern/position upright (90Y)/cough/pneumonia
change of breathing pattern/oral hygiene/position upright (90Y)/cough/gurgly voice/fever/pneumonia/throat clearing/upper respiratory infection
change of breathing pattern/oral hygiene/position upright (90Y)/cough/gurgly voice/fever/pneumonia/throat clearing/upper respiratory infection

## 2025-01-11 NOTE — PHYSICAL THERAPY INITIAL EVALUATION ADULT - ADDITIONAL COMMENTS
Pt states PTA he lives in a  with wife and son, 9 RAMESH with handrail on the left, flight of stairs inside of home to get to bedroom with handrail on left. Pt states he uses hurrycane at home.
Unable to obtain social hx from pt. 2* to cognitive status. Per chart, pt. lives with wife and son in a private home with 9 steps to enter (handrail on left) and 1 FOS inside to access bedroom (handrail on left). Pt. used [employer]ry[employer]ane for ambulation PTA.

## 2025-01-11 NOTE — PHYSICAL THERAPY INITIAL EVALUATION ADULT - PERTINENT HX OF CURRENT PROBLEM, REHAB EVAL
This is a 69-year-old male with past medical history of chronic HFrEF s/p ICD, AVR porcine, HTN, PAF s/p ablation, CAD s/p PCI, DM, HLD, COPD (no home O2), smoker presenting with worsening shortness of breath and weakness.  Patient was admitted within the last 24 hours for acute on chronic CHF exacerbation, was given IV Lasix and BiPAP, and left AMA.  Patient returns stating that he became more short of breath last night after leaving the hospital and would like to be readmitted for treatment.  Denies fever/chills, chest pain, abdominal pain, calf pain, N/V/D/C, cough, and nasal congestion.     [facility]/Tele/ICU  Course:    Patient admitted for acute on chronic systolic CHF vs Pneumonia. initial BNP 5K, leukocytosis, no fevers. CXR with bilateral lung opacities. Diuresed with Lasix 40mg, received IV ceftriaxone and azithromycin. TTE with EF 30-35%. On 12/31/24, a rapid response was called for tachycardia, chest pain, and hypoxia. Patient was noted to have multiple runs of Vtach on monitor. EKG with ventricular paced rhythm, O2 improved with supplemental oxygen, IV lasix and IV lopressor 5mg given, stabilized. Another rapid was called that same evening, with patient becoming more tachypneic, now hypoxic. CXR showed worsening fluid overload/flash pulmonary edema. Patient was becoming more somnolent and with increased labored breathing. [facility] discussions had with son Neto, patient remained full CODE. Decision was made to intubate patient and upgraded to ICU. Patient ultimately  self Extubated 1/4/2024 while doing spontaneous breathing trial. Remained off mechanical ventilation and was transitioned to BiPAP. Finish course of cefepime 1/7/2025. After that monitor off abx. He was evaluated by EP who recommended transfer to [facility] for plan of ablation.
This is a 69-year-old male with past medical history of chronic HFrEF s/p ICD, AVR porcine, HTN, PAF s/p ablation, CAD s/p PCI, DM, HLD, COPD (no home O2), smoker presenting with worsening shortness of breath and weakness.  Patient was admitted within the last 24 hours for acute on chronic CHF exacerbation, was given IV Lasix and BiPAP, and left AMA.  Patient returns stating that he became more short of breath last night after leaving the hospital and would like to be readmitted for treatment.  Denies fever/chills, chest pain, abdominal pain, calf pain, N/V/D/C, cough, and nasal congestion.

## 2025-01-11 NOTE — SWALLOW BEDSIDE ASSESSMENT ADULT - SLP PERTINENT HISTORY OF CURRENT PROBLEM
69-year-old male with past medical history of chronic HFrEF s/p ICD, AVR porcine, HTN, PAF s/p ablation, CAD s/p PCI, DM, HLD, COPD (no home O2), smoker. Presenting with worsening shortness of breath and weakness with concern for acute on chronic CHF exacerbation in the setting of persistent afib. CXR--> increased bilateral opacities/ effusions. CTH is negative.

## 2025-01-11 NOTE — PHYSICAL THERAPY INITIAL EVALUATION ADULT - IMPAIRMENTS CONTRIBUTING IMPAIRED BED MOBILITY, REHAB EVAL
impaired balance/cognition/impaired coordination/impaired motor control/impaired postural control/decreased strength
decreased cardiopulmonary endurance/impaired balance/cognition/decreased strength

## 2025-01-11 NOTE — PHYSICAL THERAPY INITIAL EVALUATION ADULT - GAIT DEVIATIONS NOTED, PT EVAL
increased time in double stance/decreased step length/decreased weight-shifting ability
decreased fifi/increased time in double stance/decreased velocity of limb motion/decreased step length/decreased stride length

## 2025-01-11 NOTE — SWALLOW BEDSIDE ASSESSMENT ADULT - SLP GENERAL OBSERVATIONS
Pt. received OOB in chair on 1:1 sit on 2L O2 nasal cannula.
Pt. received in bed on 2L O2 nasal cannula.
Pt received lethargic, 4l NC. 1:1 sit at bedside
Pt. received OOBTC on 4L O2 nasal cannula. Oriented to person, place, and event. Hoarse/raspy vocal quality.

## 2025-01-11 NOTE — PHYSICAL THERAPY INITIAL EVALUATION ADULT - GENERAL OBSERVATIONS, REHAB EVAL
10:50-11:20 Pt. encountered in semifowler in bed in NAD. 1:1 sit at bedside. +4L O2 NC, +Tele. Agreeable to PT. Adilson BUTT aware.
9:00-9:45, chart thoroughly reviewed, ok to be seen for PT as per FILOMENA Winter, pt in agreement with PT.  Pt received in bed in semifowlers, +IV, +tele, +dignishield, +iyer cath, +pulseox, +BP cuff, +O2, in NAD.  Left in chair sitting comfortably, all lines in tact, in NAD, RN Notified.

## 2025-01-11 NOTE — PHYSICAL THERAPY INITIAL EVALUATION ADULT - IMPAIRMENTS CONTRIBUTING TO GAIT DEVIATIONS, PT EVAL
impaired balance/impaired coordination/impaired postural control/decreased strength
c/o L foot pain; decreased cardiopulmonary endurance/impaired balance/cognition/pain/decreased strength

## 2025-01-11 NOTE — SWALLOW BEDSIDE ASSESSMENT ADULT - NS SPL SWALLOW CLINIC TRIAL FT
+ toleration observed without overt symptoms of penetration/aspiration; no teeth therefore harder chewables not recommended

## 2025-01-11 NOTE — PHYSICAL THERAPY INITIAL EVALUATION ADULT - PHYSICAL ASSIST/NONPHYSICAL ASSIST: GAIT, REHAB EVAL
set-up required/verbal cues/2 person assist
for encouragement and safety/verbal cues/2 person assist

## 2025-01-11 NOTE — PHYSICAL THERAPY INITIAL EVALUATION ADULT - PRECAUTIONS/LIMITATIONS, REHAB EVAL
+4L O2 NC/cardiac precautions/oxygen therapy device and L/min
fall precautions/oxygen therapy device and L/min

## 2025-01-11 NOTE — SWALLOW BEDSIDE ASSESSMENT ADULT - SWALLOW EVAL: DIAGNOSIS
Toleration of puree, soft & bite sized, with thin liquids w/o overt s/s of penetration/aspiration.
Toleration of lunch soft & bite sized, with thin liquids w/o overt s/s of penetration/aspiration.
+ toleration observed without overt symptoms of penetration/aspiration for soft & bite sized, with thin liquids
Pt received with no awareness for feeding situation in the setting of lethargy. Po trials are not indicated at this time.

## 2025-01-11 NOTE — SWALLOW BEDSIDE ASSESSMENT ADULT - SWALLOW EVAL: ORAL MUSCULATURE
generally intact
generally intact
unable to assess due to poor participation/comprehension
generally intact

## 2025-01-11 NOTE — PROGRESS NOTE ADULT - SUBJECTIVE AND OBJECTIVE BOX
Chief complaint: Patient is a 69y old  Male who presents with a chief complaint of CHF exacerbation (10 Dameon 2025 16:24)        HPI:  This is a 69-year-old male with past medical history of chronic HFrEF s/p ICD, AVR porcine, HTN, PAF s/p ablation, CAD s/p PCI, DM, HLD, COPD (no home O2), smoker presenting with worsening shortness of breath and weakness.  Patient was admitted within the last 24 hours for acute on chronic CHF exacerbation, was given IV Lasix and BiPAP, and left AMA.  Patient returns stating that he became more short of breath last night after leaving the hospital and would like to be readmitted for treatment.  Denies fever/chills, chest pain, abdominal pain, calf pain, N/V/D/C, cough, and nasal congestion.     [facility]/Tele/ICU  Course:  Patient admitted for acute on chronic systolic CHF vs Pneumonia. initial BNP 5K, leukocytosis, no fevers. CXR with bilateral lung opacities. Diuresed with Lasix 40mg, received IV ceftriaxone and azithromycin. TTE with EF 30-35%. On 12/31/24, a rapid response was called for tachycardia, chest pain, and hypoxia. Patient was noted to have multiple runs of Vtach on monitor. EKG with ventricular paced rhythm, O2 improved with supplemental oxygen, IV lasix and IV lopressor 5mg given, stabilized. Another rapid was called that same evening, with patient becoming more tachypneic, now hypoxic. CXR showed worsening fluid overload/flash pulmonary edema. Patient was becoming more somnolent and with increased labored breathing. [facility] discussions had with son Neto, patient remained full CODE. Decision was made to intubate patient and upgraded to ICU. Patient ultimately self extubated 1/4/2024 while doing spontaneous breathing trial. Remained off mechanical ventilation and was transitioned to bipap.  Finish course of cefepime 1/7/2025. After that monitor off abx. He was evaluated by EP who recommended transfer to [facility] for plan of ablation.       Interval history:  Arrived from  [facility] 1/9th overnight   OVN: Patient with periods of agitation and confusion Pulling on medical lines i:e iyer, IV.   Patient given ativan 2 mg IV x1, placed on CO still agitated and not cooperating; patient place on 2 point restraint.   Also noted to be hypoxic down 70's on 3 L NC. Non rebreather mask placed on O2 sats improved to 100%   1/10 remains on 1:1. Started on Seroquel 12.5 mg at bedtime. Somnolent but arousable and answers appropriately    Review of systems: A complete 10-point review of systems was obtained and is negative except as stated in the interval history.    Vitals:  T(F): 97.2, Max: 98.8 (01-11 @ 04:11)  HR: 74 (74 - 106)  BP: 142/69 (120/81 - 145/81)  RR: 18 (16 - 24)  SpO2: 99% (90% - 99%)    Ins & outs:     01-08 @ 07:01  -  01-09 @ 07:00  --------------------------------------------------------  IN: 1282 mL / OUT: 1550 mL / NET: -268 mL    01-09 @ 07:01  -  01-10 @ 07:00  --------------------------------------------------------  IN: 1279 mL / OUT: 1801 mL / NET: -522 mL    01-10 @ 07:01 - 01-11 @ 07:00  --------------------------------------------------------  IN: 52 mL / OUT: 1200 mL / NET: -1148 mL      Weight trend:      Physical exam:  General: No apparent distress  HEENT: Anicteric sclera. Moist mucous membranes.   Cardiac: Regular rate and rhythm. No murmurs, rubs, or gallops.   Vascular: Symmetric radial pulses. Dorsalis pedis pulses palpable.   Respiratory: Normal effort. Bibasilar crackles. Clear to ascultation.   Abdomen: Soft, nontender. Audible bowel sounds.   Extremities: Warm without edema. No cyanosis or clubbing.   Skin: Warm and dry. No rash.   Neurologic: Grossly normal motor function.   Psychiatric: Oriented to person, place, and time.     Data reviewed:  -  Telemetry:  - ECG 1/9th)   (Ventricular Rate 74 BPM    Atrial Rate 277 BPM    QRS Duration 154 ms    Q-T Interval 492 ms    QTC Calculation(Bazett) 546 ms    R Axis 246 degrees    T Axis 71 degrees    Diagnosis Line Ventricular-paced rhythm  Biventricular pacemaker detected  Abnormal ECG      - Echo (12/27)    1. Left ventricular ejection fraction, by visual estimation, is 40 to   45%.   2. Mildly enlarged left atrium.   3. Mild mitral annular calcification.   4. Mild mitral valve regurgitation.   5. Mild tricuspid regurgitation.   6. Bioprosthesis in the aortic position.   7. Ascending aorta 3.7 cm.   8. Estimated pulmonary artery systolic pressure is 35.7 mmHg assuming a   right atrial pressure of 3 mmHg, which is consistent with borderline   pulmonary hypertension.    - Radiology:    CXR:   Increased bilateral opacities/effusions.    - Labs:                        14.8   16.07 )-----------( 343      ( 11 Jan 2025 06:13 )             47.0     01-11    144  |  101  |  29[H]  ----------------------------<  173[H]  4.5   |  28  |  1.6[H]    Ca    9.7      11 Jan 2025 06:13  Mg     2.1     01-11    TPro  6.0  /  Alb  3.4[L]  /  TBili  0.8  /  DBili  x   /  AST  31  /  ALT  56[H]  /  AlkPhos  76  01-11    PTT - ( 11 Jan 2025 06:13 )  PTT:34.9 sec            Urinalysis Basic - ( 11 Jan 2025 06:13 )    Color: x / Appearance: x / SG: x / pH: x  Gluc: 173 mg/dL / Ketone: x  / Bili: x / Urobili: x   Blood: x / Protein: x / Nitrite: x   Leuk Esterase: x / RBC: x / WBC x   Sq Epi: x / Non Sq Epi: x / Bacteria: x        Medications:  aMIOdarone    Tablet 200 milliGRAM(s) Oral daily  apixaban 5 milliGRAM(s) Oral every 12 hours  atorvastatin 40 milliGRAM(s) Oral at bedtime  chlorhexidine 2% Cloths 1 Application(s) Topical <User Schedule>  clopidogrel Tablet 75 milliGRAM(s) Oral daily  dextrose 50% Injectable 25 Gram(s) IV Push once  dextrose 50% Injectable 12.5 Gram(s) IV Push once  dextrose 50% Injectable 25 Gram(s) IV Push once  ezetimibe 10 milliGRAM(s) Oral daily  fenofibrate Tablet 145 milliGRAM(s) Oral daily  glucagon  Injectable 1 milliGRAM(s) IntraMuscular once  insulin glargine Injectable (LANTUS) 10 Unit(s) SubCutaneous at bedtime  insulin lispro (ADMELOG) corrective regimen sliding scale   SubCutaneous three times a day before meals  metoprolol tartrate 75 milliGRAM(s) Oral every 6 hours  polyethylene glycol 3350 17 Gram(s) Oral two times a day  QUEtiapine 12.5 milliGRAM(s) Oral at bedtime  senna 2 Tablet(s) Oral at bedtime    Drips:  dextrose 5%. 1000 milliLiter(s) (50 mL/Hr) IV Continuous <Continuous>    PRN:     Allergies    sulfa drugs (Unknown)    Intolerances      Assessment:  69-year-old male with past medical history of chronic HFrEF s/p ICD, AVR porcine, HTN, PAF s/p ablation, CAD s/p PCI, DM, HLD, COPD (no home O2), smoker presenting with worsening shortness of breath and weakness with concern for acute on chronic CHF exacerbation in the setting of persistent afib    Problems discussed and associated plan:    #Altered mental status   #toxic metabolic encephalopathy  While in [facility] , [place] head negative   - recieved Seroquel 12.5mg last night, continue nightly   - initiate delirum precaution  - maintain constant observation   - fall precaution   -  obtain daily ECG to monitor qtc  -avoid further qtc prolonging agents  -  Speech and swallow: rec diet soft, bite size with thin liquids     # AFIB/aflutter  #Hx ICM s/p CRT-D  #Hx Bioprosthetic AV  - c/w amiodarone 200mg daily  , metoprolol 75mg q 6hrs   - possible Ablation by EP on Tuesday 1/14th  - on Eliquis 5mg BID       #Acute Hypoxemic Respiratory Failure due to pulmonary edema vs ? resp. infection   #Hx COPD   - CXR with worsening effusion &  opacity, ProBNP 597018   - continue to wean off O2   - Lasix 40mg IV x1 once and re-evaluate on daily basis  - BMP daily checks  - obtain RVP   - WBC down, procalcitonin 0.12,  hold off on Abx   >> appreciate ID recs>>- if WBC worsening tomorrow, can restart cefepime 2g q 12 hours per ID   - recheck procalcitonin a 2 days if concern for infection     #KAREN   - trend renal functions  - discontinue iyer. f/u TOV   - Avoid nephrotoxic meds   -Monitor I & O     # Transaminitis ( LFTS improving) .  - sp GI consult while in [facility]   likely due to CHF  congestive hepatopathy   -monitor LFTs  -acute hepatitis panel WNL  -avoid hepatotoxic medications  -optimize cardiac status      Hx CVA  - c/w Plavix       Hx DM   - F.S ACHS   - c/w ISS   - Lantus 10 units QHS     #HLD  - C/w zetia 10mg daily   - c/w fenofibrate 145mg po daily , lipitor 40mg daily       Please contact me with any questions or concerns at [phone]. Chief complaint: Patient is a 69y old  Male who presents with a chief complaint of CHF exacerbation (10 Dameon 2025 16:24)        HPI:  This is a 69-year-old male with past medical history of chronic HFrEF s/p ICD, AVR porcine, HTN, PAF s/p ablation, CAD s/p PCI, DM, HLD, COPD (no home O2), smoker presenting with worsening shortness of breath and weakness.  Patient was admitted within the last 24 hours for acute on chronic CHF exacerbation, was given IV Lasix and BiPAP, and left AMA.  Patient returns stating that he became more short of breath last night after leaving the hospital and would like to be readmitted for treatment.  Denies fever/chills, chest pain, abdominal pain, calf pain, N/V/D/C, cough, and nasal congestion.     [facility]/Tele/ICU  Course:  Patient admitted for acute on chronic systolic CHF vs Pneumonia. initial BNP 5K, leukocytosis, no fevers. CXR with bilateral lung opacities. Diuresed with Lasix 40mg, received IV ceftriaxone and azithromycin. TTE with EF 30-35%. On 12/31/24, a rapid response was called for tachycardia, chest pain, and hypoxia. Patient was noted to have multiple runs of Vtach on monitor. EKG with ventricular paced rhythm, O2 improved with supplemental oxygen, IV lasix and IV lopressor 5mg given, stabilized. Another rapid was called that same evening, with patient becoming more tachypneic, now hypoxic. CXR showed worsening fluid overload/flash pulmonary edema. Patient was becoming more somnolent and with increased labored breathing. [facility] discussions had with son Neto, patient remained full CODE. Decision was made to intubate patient and upgraded to ICU. Patient ultimately self extubated 1/4/2024 while doing spontaneous breathing trial. Remained off mechanical ventilation and was transitioned to bipap.  Finish course of cefepime 1/7/2025. After that monitor off abx. He was evaluated by EP who recommended transfer to [facility] for plan of ablation.       Interval history:  Arrived from  [facility] 1/9th overnight   OVN1/10 remains on 1:1. Started on Seroquel 12.5 mg at bedtime. Somnolent but arousable and answers appropriately    Review of systems: A complete 10-point review of systems was obtained and is negative except as stated in the interval history.    Vitals:  T(F): 97.2, Max: 98.8 (01-11 @ 04:11)  HR: 74 (74 - 106)  BP: 142/69 (120/81 - 145/81)  RR: 18 (16 - 24)  SpO2: 99% (90% - 99%)    Ins & outs:     01-08 @ 07:01  -  01-09 @ 07:00  --------------------------------------------------------  IN: 1282 mL / OUT: 1550 mL / NET: -268 mL    01-09 @ 07:01  -  01-10 @ 07:00  --------------------------------------------------------  IN: 1279 mL / OUT: 1801 mL / NET: -522 mL    01-10 @ 07:01  -  01-11 @ 07:00  --------------------------------------------------------  IN: 52 mL / OUT: 1200 mL / NET: -1148 mL      Weight trend:      Physical exam:  General: No apparent distress  HEENT: Anicteric sclera. Moist mucous membranes.   Cardiac: Regular rate and rhythm. No murmurs, rubs, or gallops.   Vascular: Symmetric radial pulses. Dorsalis pedis pulses palpable.   Respiratory: Normal effort. Bibasilar crackles. Clear to ascultation.   Abdomen: Soft, nontender. Audible bowel sounds.   Extremities: Warm without edema. No cyanosis or clubbing.   Skin: Warm and dry. No rash.   Neurologic: Grossly normal motor function.   Psychiatric: Oriented to person, place, and time.     Data reviewed:  -  Telemetry:  - ECG 1/9th)   (Ventricular Rate 74 BPM    Atrial Rate 277 BPM    QRS Duration 154 ms    Q-T Interval 492 ms    QTC Calculation(Bazett) 546 ms    R Axis 246 degrees    T Axis 71 degrees    Diagnosis Line Ventricular-paced rhythm  Biventricular pacemaker detected  Abnormal ECG      - Echo (12/27)    1. Left ventricular ejection fraction, by visual estimation, is 40 to   45%.   2. Mildly enlarged left atrium.   3. Mild mitral annular calcification.   4. Mild mitral valve regurgitation.   5. Mild tricuspid regurgitation.   6. Bioprosthesis in the aortic position.   7. Ascending aorta 3.7 cm.   8. Estimated pulmonary artery systolic pressure is 35.7 mmHg assuming a   right atrial pressure of 3 mmHg, which is consistent with borderline   pulmonary hypertension.    - Radiology:    CXR:   Increased bilateral opacities/effusions.    - Labs:                        14.8   16.07 )-----------( 343      ( 11 Jan 2025 06:13 )             47.0     01-11    144  |  101  |  29[H]  ----------------------------<  173[H]  4.5   |  28  |  1.6[H]    Ca    9.7      11 Jan 2025 06:13  Mg     2.1     01-11    TPro  6.0  /  Alb  3.4[L]  /  TBili  0.8  /  DBili  x   /  AST  31  /  ALT  56[H]  /  AlkPhos  76  01-11    PTT - ( 11 Jan 2025 06:13 )  PTT:34.9 sec            Urinalysis Basic - ( 11 Jan 2025 06:13 )    Color: x / Appearance: x / SG: x / pH: x  Gluc: 173 mg/dL / Ketone: x  / Bili: x / Urobili: x   Blood: x / Protein: x / Nitrite: x   Leuk Esterase: x / RBC: x / WBC x   Sq Epi: x / Non Sq Epi: x / Bacteria: x        Medications:  aMIOdarone    Tablet 200 milliGRAM(s) Oral daily  apixaban 5 milliGRAM(s) Oral every 12 hours  atorvastatin 40 milliGRAM(s) Oral at bedtime  chlorhexidine 2% Cloths 1 Application(s) Topical <User Schedule>  clopidogrel Tablet 75 milliGRAM(s) Oral daily  dextrose 50% Injectable 25 Gram(s) IV Push once  dextrose 50% Injectable 12.5 Gram(s) IV Push once  dextrose 50% Injectable 25 Gram(s) IV Push once  ezetimibe 10 milliGRAM(s) Oral daily  fenofibrate Tablet 145 milliGRAM(s) Oral daily  glucagon  Injectable 1 milliGRAM(s) IntraMuscular once  insulin glargine Injectable (LANTUS) 10 Unit(s) SubCutaneous at bedtime  insulin lispro (ADMELOG) corrective regimen sliding scale   SubCutaneous three times a day before meals  metoprolol tartrate 75 milliGRAM(s) Oral every 6 hours  polyethylene glycol 3350 17 Gram(s) Oral two times a day  QUEtiapine 12.5 milliGRAM(s) Oral at bedtime  senna 2 Tablet(s) Oral at bedtime    Drips:  dextrose 5%. 1000 milliLiter(s) (50 mL/Hr) IV Continuous <Continuous>    PRN:     Allergies    sulfa drugs (Unknown)    Intolerances      Assessment:  69-year-old male with past medical history of chronic HFrEF s/p ICD, AVR porcine, HTN, PAF s/p ablation, CAD s/p PCI, DM, HLD, COPD (no home O2), smoker presenting with worsening shortness of breath and weakness with concern for acute on chronic CHF exacerbation in the setting of persistent afib    Problems discussed and associated plan:    #Altered mental status   #toxic metabolic encephalopathy  While in [facility] , [place] head negative   - recieved Seroquel 12.5mg last night, continue nightly   - initiate delirum precaution  - maintain constant observation   - fall precaution   -  obtain daily ECG to monitor qtc  -avoid further qtc prolonging agents  -  Speech and swallow: rec diet soft, bite size with thin liquids     # AFIB/aflutter  #Hx ICM s/p CRT-D  #Hx Bioprosthetic AV  - c/w amiodarone 200mg daily  , metoprolol 75mg q 6hrs   - possible Ablation by EP on Tuesday 1/14th  - on Eliquis 5mg BID       #Acute Hypoxemic Respiratory Failure due to pulmonary edema vs ? resp. infection   #Hx COPD   - CXR with worsening effusion &  opacity, ProBNP 234099   - continue to wean off O2   - Lasix 40mg IV x1 once and re-evaluate on daily basis  - BMP daily checks  - obtain RVP   - WBC down, procalcitonin 0.12,  hold off on Abx   >> appreciate ID recs>>- if WBC worsening tomorrow, can restart cefepime 2g q 12 hours per ID   - recheck procalcitonin a 2 days if concern for infection     #KAREN   - trend renal functions  - discontinue iyer. f/u TOV   - Avoid nephrotoxic meds   -Monitor I & O     # Transaminitis ( LFTS improving) .  - sp GI consult while in [facility]   likely due to CHF  congestive hepatopathy   -monitor LFTs  -acute hepatitis panel WNL  -avoid hepatotoxic medications  -optimize cardiac status      Hx CVA  - c/w Plavix       Hx DM   - F.S ACHS   - c/w ISS   - Lantus 10 units QHS     #HLD  - C/w zetia 10mg daily   - c/w fenofibrate 145mg po daily , lipitor 40mg daily       Please contact me with any questions or concerns at [phone].

## 2025-01-11 NOTE — PHYSICAL THERAPY INITIAL EVALUATION ADULT - TRANSFER SAFETY CONCERNS NOTED: SIT/STAND, REHAB EVAL
decreased balance during turns/decreased safety awareness/decreased weight-shifting ability
decreased balance during turns/decreased safety awareness/losing balance/decreased proprioception/decreased sequencing ability/decreased step length

## 2025-01-11 NOTE — PHYSICAL THERAPY INITIAL EVALUATION ADULT - CRITERIA FOR SKILLED THERAPEUTIC INTERVENTIONS
impairments found
impairments found/functional limitations in following categories/rehab potential/therapy frequency/predicted duration of therapy intervention/anticipated equipment needs at discharge/anticipated discharge recommendation

## 2025-01-11 NOTE — SWALLOW BEDSIDE ASSESSMENT ADULT - SWALLOW EVAL: RECOMMENDED FEEDING/EATING TECHNIQUES
maintain upright posture during/after eating for 30 mins/oral hygiene/position upright (90 degrees)

## 2025-01-11 NOTE — PHYSICAL THERAPY INITIAL EVALUATION ADULT - IMPAIRED TRANSFERS: SIT/STAND, REHAB EVAL
c/o L foot pain; decreased cardiopulmonary endurance/impaired balance/cognition/pain/decreased strength
impaired balance/impaired coordination/impaired postural control/decreased strength

## 2025-01-11 NOTE — PHYSICAL THERAPY INITIAL EVALUATION ADULT - TRANSFER SAFETY CONCERNS NOTED: BED/CHAIR, REHAB EVAL
decreased balance during turns/decreased safety awareness/decreased proprioception/decreased step length
decreased balance during turns/decreased safety awareness/decreased weight-shifting ability

## 2025-01-12 LAB
ANION GAP SERPL CALC-SCNC: 13 MMOL/L — SIGNIFICANT CHANGE UP (ref 7–14)
BUN SERPL-MCNC: 30 MG/DL — HIGH (ref 10–20)
CALCIUM SERPL-MCNC: 9.3 MG/DL — SIGNIFICANT CHANGE UP (ref 8.4–10.5)
CHLORIDE SERPL-SCNC: 97 MMOL/L — LOW (ref 98–110)
CO2 SERPL-SCNC: 29 MMOL/L — SIGNIFICANT CHANGE UP (ref 17–32)
CREAT SERPL-MCNC: 1.6 MG/DL — HIGH (ref 0.7–1.5)
EGFR: 46 ML/MIN/1.73M2 — LOW
GLUCOSE BLDC GLUCOMTR-MCNC: 141 MG/DL — HIGH (ref 70–99)
GLUCOSE BLDC GLUCOMTR-MCNC: 192 MG/DL — HIGH (ref 70–99)
GLUCOSE BLDC GLUCOMTR-MCNC: 214 MG/DL — HIGH (ref 70–99)
GLUCOSE BLDC GLUCOMTR-MCNC: 253 MG/DL — HIGH (ref 70–99)
GLUCOSE SERPL-MCNC: 226 MG/DL — HIGH (ref 70–99)
HCT VFR BLD CALC: 40.8 % — LOW (ref 42–52)
HGB BLD-MCNC: 13.2 G/DL — LOW (ref 14–18)
MAGNESIUM SERPL-MCNC: 1.9 MG/DL — SIGNIFICANT CHANGE UP (ref 1.8–2.4)
MCHC RBC-ENTMCNC: 29.7 PG — SIGNIFICANT CHANGE UP (ref 27–31)
MCHC RBC-ENTMCNC: 32.4 G/DL — SIGNIFICANT CHANGE UP (ref 32–37)
MCV RBC AUTO: 91.9 FL — SIGNIFICANT CHANGE UP (ref 80–94)
NRBC # BLD: 0 /100 WBCS — SIGNIFICANT CHANGE UP (ref 0–0)
PLATELET # BLD AUTO: 349 K/UL — SIGNIFICANT CHANGE UP (ref 130–400)
PMV BLD: 12.1 FL — HIGH (ref 7.4–10.4)
POTASSIUM SERPL-MCNC: 4.1 MMOL/L — SIGNIFICANT CHANGE UP (ref 3.5–5)
POTASSIUM SERPL-SCNC: 4.1 MMOL/L — SIGNIFICANT CHANGE UP (ref 3.5–5)
RBC # BLD: 4.44 M/UL — LOW (ref 4.7–6.1)
RBC # FLD: 13.9 % — SIGNIFICANT CHANGE UP (ref 11.5–14.5)
SODIUM SERPL-SCNC: 139 MMOL/L — SIGNIFICANT CHANGE UP (ref 135–146)
WBC # BLD: 17.21 K/UL — HIGH (ref 4.8–10.8)
WBC # FLD AUTO: 17.21 K/UL — HIGH (ref 4.8–10.8)

## 2025-01-12 PROCEDURE — 93010 ELECTROCARDIOGRAM REPORT: CPT

## 2025-01-12 PROCEDURE — 99232 SBSQ HOSP IP/OBS MODERATE 35: CPT

## 2025-01-12 RX ORDER — ACETAMINOPHEN 80 MG/.8ML
650 SOLUTION/ DROPS ORAL ONCE
Refills: 0 | Status: COMPLETED | OUTPATIENT
Start: 2025-01-12 | End: 2025-01-12

## 2025-01-12 RX ORDER — FUROSEMIDE 20 MG
40 TABLET ORAL ONCE
Refills: 0 | Status: COMPLETED | OUTPATIENT
Start: 2025-01-12 | End: 2025-01-12

## 2025-01-12 RX ADMIN — SENNOSIDES 2 TABLET(S): 8.6 TABLET, FILM COATED ORAL at 22:16

## 2025-01-12 RX ADMIN — CHLORHEXIDINE GLUCONATE 1 APPLICATION(S): 1.2 RINSE ORAL at 05:37

## 2025-01-12 RX ADMIN — Medication 75 MILLIGRAM(S): at 11:48

## 2025-01-12 RX ADMIN — ATORVASTATIN CALCIUM 40 MILLIGRAM(S): 40 TABLET, FILM COATED ORAL at 22:13

## 2025-01-12 RX ADMIN — APIXABAN 5 MILLIGRAM(S): 5 TABLET, FILM COATED ORAL at 05:34

## 2025-01-12 RX ADMIN — Medication 17 GRAM(S): at 05:34

## 2025-01-12 RX ADMIN — Medication 17 GRAM(S): at 17:17

## 2025-01-12 RX ADMIN — INSULIN GLARGINE-YFGN 10 UNIT(S): 100 INJECTION, SOLUTION SUBCUTANEOUS at 22:12

## 2025-01-12 RX ADMIN — EZETIMIBE 10 MILLIGRAM(S): 10 TABLET ORAL at 11:48

## 2025-01-12 RX ADMIN — Medication 75 MILLIGRAM(S): at 23:31

## 2025-01-12 RX ADMIN — Medication 75 MILLIGRAM(S): at 17:17

## 2025-01-12 RX ADMIN — QUETIAPINE FUMARATE 12.5 MILLIGRAM(S): 100 TABLET, FILM COATED ORAL at 22:13

## 2025-01-12 RX ADMIN — Medication 40 MILLIGRAM(S): at 16:02

## 2025-01-12 RX ADMIN — Medication 75 MILLIGRAM(S): at 05:34

## 2025-01-12 RX ADMIN — FENOFIBRATE 145 MILLIGRAM(S): 48 TABLET ORAL at 11:48

## 2025-01-12 RX ADMIN — Medication 2: at 17:16

## 2025-01-12 RX ADMIN — Medication 1: at 07:47

## 2025-01-12 RX ADMIN — ACETAMINOPHEN 650 MILLIGRAM(S): 80 SOLUTION/ DROPS ORAL at 11:48

## 2025-01-12 RX ADMIN — AMIODARONE HYDROCHLORIDE 200 MILLIGRAM(S): 200 TABLET ORAL at 05:34

## 2025-01-12 RX ADMIN — CLOPIDOGREL BISULFATE 75 MILLIGRAM(S): 75 TABLET, FILM COATED ORAL at 11:49

## 2025-01-12 RX ADMIN — APIXABAN 5 MILLIGRAM(S): 5 TABLET, FILM COATED ORAL at 17:17

## 2025-01-12 NOTE — PROGRESS NOTE ADULT - NS ATTEND AMEND GEN_ALL_CORE FT
69yoM with iCM with LVEF 40-45% s/p CRT-D (2020), CAD with h/o PCI, porcine AVR, pAF (ablation in 4/2024, recently started on amiodarone for inappropriate shocks due to RVR therefore started on amiodarone), HTN, HLD, DM, COPD (not on home O2), and active tobacco use who was transferred from [facility] 1/10/25 for Afib ablation.     At [facility] patient was admitted for acute on chronic HFrEF, course complicated by flash pulmonary edema requiring intubation. Found to be in AFib with RVR. Diuresed and treated with 7-day course of antibiotics for pneumonia. Self-extubated 1/4/25 and transitioned from BiPAP to nasal cannula.     Diuresed 1/9/25 and 1/10/25 with good urine output but had fluffy opacities on 1/10 CXR.    Today, afebrile. WBC stable. Procal 0.12. SpO2 98% on 3L NC. IVC not fully collapsing    Plan:  - Give Lasix 40 mg IV x1 today  - Will hold off on antibiotics at this time  - Continue Seroquel, no ambien  - Continue Eliquis 5 mg BID  - On amiodarone 200 mg daily and Lopressor 75 mg q6h  - Continue home Plavix 75 mg daily, atorvastatin 40 mg nightly, Zetia 10 mg daily, and fenofibrate 145 mg daily     Reema Card MD  Vascular Cardiology Attending  Please text or call via MS Teams with questions

## 2025-01-12 NOTE — PROGRESS NOTE ADULT - ASSESSMENT
Assessment:  69-year-old male with past medical history of chronic HFrEF s/p ICD, AVR porcine, HTN, PAF s/p ablation, CAD s/p PCI, DM, HLD, COPD (no home O2), smoker presenting with worsening shortness of breath and weakness with concern for acute on chronic CHF exacerbation in the setting of persistent afib    Problems discussed and associated plan:    #Altered mental status   #toxic metabolic encephalopathy  While in [facility] , CT head negative   - recieved Seroquel 12.5mg last night, continue nightly   - initiate delirum precaution  - maintain constant observation   - fall precaution   -  obtain daily ECG to monitor qtc  -avoid further qtc prolonging agents  -  Speech and swallow: rec diet soft, bite size with thin liquids     # AFIB/aflutter  #Hx ICM s/p CRT-D  #Hx Bioprosthetic AV  - c/w amiodarone 200mg daily  , metoprolol 75mg q 6hrs   - NPO [place] Monday night for Ablation by EP on Tuesday 1/14th  - Hold Eliquis 5mg the morning of ablation      #Acute Hypoxemic Respiratory Failure due to pulmonary edema vs ? resp. infection   #Hx COPD   - CXR with worsening effusion &  opacity, ProBNP [id number]   - continue to wean off O2   - Lasix 40mg IV x1 once and re-evaluate on daily basis  - BMP daily checks  - obtain RVP   - WBC down, procalcitonin 0.12,  hold off on Abx   >> appreciate ID recs>>- if WBC worsening tomorrow, can restart cefepime 2g q 12 hours per ID   - recheck procalcitonin a 2 days if concern for infection     #KAREN   - trend renal functions  - discontinue iyer. f/u TOV   - Avoid nephrotoxic meds   -Monitor I & O     # Transaminitis ( LFTS improving) .  - sp GI consult while in [facility]   likely due to CHF  congestive hepatopathy   -monitor LFTs  -acute hepatitis panel WNL  -avoid hepatotoxic medications  -optimize cardiac status    Hx CVA  - c/w Plavix     Hx DM   - F.S ACHS   - c/w ISS   - Lantus 10 units QHS     #HLD  - C/w zetia 10mg daily   - c/w fenofibrate 145mg po daily , lipitor 40mg daily       Please contact me with any questions or concerns at [phone]. Assessment:  69-year-old male with past medical history of chronic HFrEF s/p ICD, AVR porcine, HTN, PAF s/p ablation, CAD s/p PCI, DM, HLD, COPD (no home O2), smoker presenting with worsening shortness of breath and weakness with concern for acute on chronic CHF exacerbation in the setting of persistent afib    Problems discussed and associated plan:    #Altered mental status   #toxic metabolic encephalopathy  While in [facility] , [place] head negative   - received Seroquel 12.5mg last night, continue nightly   - initiate delirum precaution  - maintain constant observation   - fall precaution   -  obtain daily ECG to monitor qtc  -avoid further qtc prolonging agents  -  Speech and swallow: rec diet soft, bite size with thin liquids     # AFIB/aflutter  #Hx ICM s/p CRT-D  #Hx Bioprosthetic AV  - c/w amiodarone 200mg daily  , metoprolol 75mg q 6hrs   - NPO [place] Monday night for Ablation by EP on Tuesday 1/14th  - Hold Eliquis 5mg the morning of ablation      #Acute Hypoxemic Respiratory Failure due to pulmonary edema vs ? resp. infection   #Hx COPD   - CXR with worsening effusion &  opacity, ProBNP [id number]   - continue to wean off O2   - Lasix 40mg IV x1 once and re-evaluate on daily basis  - BMP daily checks  - obtain RVP   - WBC down, procalcitonin 0.12,  hold off on Abx   >> appreciate ID recs>>- if WBC worsening tomorrow, can restart cefepime 2g q 12 hours per ID   - recheck procalcitonin a 2 days if concern for infection     #KAREN   - trend renal functions  - discontinue iyer. f/u TOV   - Avoid nephrotoxic meds   -Monitor I & O     # Transaminitis ( LFTS improving) .  - sp GI consult while in [facility]   likely due to CHF  congestive hepatopathy   -monitor LFTs  -acute hepatitis panel WNL  -avoid hepatotoxic medications  -optimize cardiac status    Hx CVA  - c/w Plavix     Hx DM   - F.S ACHS   - c/w ISS   - Lantus 10 units QHS     #HLD  - C/w zetia 10mg daily   - c/w fenofibrate 145mg po daily , lipitor 40mg daily       Please contact me with any questions or concerns at [phone].

## 2025-01-12 NOTE — PROGRESS NOTE ADULT - SUBJECTIVE AND OBJECTIVE BOX
Chief complaint: Patient is a 69y old  Male who presents with a chief complaint of CHF exacerbation (11 Jan 2025 11:54)    HPI:  This is a 69-year-old male with past medical history of chronic HFrEF s/p ICD, AVR porcine, HTN, PAF s/p ablation, CAD s/p PCI, DM, HLD, COPD (no home O2), smoker presenting with worsening shortness of breath and weakness.  Patient was admitted within the last 24 hours for acute on chronic CHF exacerbation, was given IV Lasix and BiPAP, and left AMA.  Patient returns stating that he became more short of breath last night after leaving the hospital and would like to be readmitted for treatment.  Denies fever/chills, chest pain, abdominal pain, calf pain, N/V/D/C, cough, and nasal congestion.     [facility]/Tele/ICU  Course:  Patient admitted for acute on chronic systolic CHF vs Pneumonia. initial BNP 5K, leukocytosis, no fevers. CXR with bilateral lung opacities. Diuresed with Lasix 40mg, received IV ceftriaxone and azithromycin. TTE with EF 30-35%. On 12/31/24, a rapid response was called for tachycardia, chest pain, and hypoxia. Patient was noted to have multiple runs of Vtach on monitor. EKG with ventricular paced rhythm, O2 improved with supplemental oxygen, IV lasix and IV lopressor 5mg given, stabilized. Another rapid was called that same evening, with patient becoming more tachypneic, now hypoxic. CXR showed worsening fluid overload/flash pulmonary edema. Patient was becoming more somnolent and with increased labored breathing. [facility] discussions had with son Neto, patient remained full CODE. Decision was made to intubate patient and upgraded to ICU. Patient ultimately self extubated 1/4/2024 while doing spontaneous breathing trial. Remained off mechanical ventilation and was transitioned to bipap.  Finish course of cefepime 1/7/2025. After that monitor off abx. He was evaluated by EP who recommended transfer to [facility] for plan of ablation.     Interval history: Patient seen and examined at bedside. 1:1 sit remains in place.     Review of systems: A complete 10-point review of systems was obtained and is negative except as stated in the interval history.    Vitals:  T(F): 98, Max: 98 (01-12 @ 08:14)  HR: 86 (80 - 96)  BP: 121/71 (105/60 - 156/90)  RR: 22 (18 - 25)  SpO2: 98% (98% - 100%)    Ins & outs:     01-09 @ 07:01  -  01-10 @ 07:00  --------------------------------------------------------  IN: 1279 mL / OUT: 1801 mL / NET: -522 mL    01-10 @ 07:01 - 01-11 @ 07:00  --------------------------------------------------------  IN: 52 mL / OUT: 1200 mL / NET: -1148 mL    01-11 @ 07:01 - 01-12 @ 07:00  --------------------------------------------------------  IN: 341 mL / OUT: 1100 mL / NET: -759 mL    01-12 @ 07:01 - 01-12 @ 11:08  --------------------------------------------------------  IN: 0 mL / OUT: 500 mL / NET: -500 mL      Weight trend:      Physical exam:  General: No apparent distress  HEENT: Anicteric sclera. Moist mucous membranes.   Cardiac: Regular rate and rhythm. No murmurs, rubs, or gallops.   Vascular: Symmetric radial pulses. Dorsalis pedis pulses palpable.   Respiratory: Normal effort. Bibasilar crackles. Clear to ascultation.   Abdomen: Soft, nontender. Audible bowel sounds.   Extremities: Warm without edema. No cyanosis or clubbing.   Skin: Warm and dry. No rash.   Neurologic: Grossly normal motor function.   Psychiatric: Oriented to person, place, and time.     Data reviewed:  -  Telemetry: V paced nsr hr 80-96 bpm   - ECG 1/9th)   (Ventricular Rate 74 BPM    Atrial Rate 277 BPM    QRS Duration 154 ms    Q-T Interval 492 ms    QTC Calculation(Bazett) 546 ms    R Axis 246 degrees    T Axis 71 degrees    Diagnosis Line Ventricular-paced rhythm  Biventricular pacemaker detected  Abnormal ECG      - Echo (12/27)    1. Left ventricular ejection fraction, by visual estimation, is 40 to   45%.   2. Mildly enlarged left atrium.   3. Mild mitral annular calcification.   4. Mild mitral valve regurgitation.   5. Mild tricuspid regurgitation.   6. Bioprosthesis in the aortic position.   7. Ascending aorta 3.7 cm.   8. Estimated pulmonary artery systolic pressure is 35.7 mmHg assuming a   right atrial pressure of 3 mmHg, which is consistent with borderline   pulmonary hypertension.    - Radiology:    CXR:   Increased bilateral opacities/effusions.    - Labs:                        13.2   17.21 )-----------( 349      ( 12 Jan 2025 06:42 )             40.8     01-12    139  |  97[L]  |  30[H]  ----------------------------<  226[H]  4.1   |  29  |  1.6[H]    Ca    9.3      12 Jan 2025 06:42  Mg     1.9     01-12    TPro  6.0  /  Alb  3.4[L]  /  TBili  0.8  /  DBili  x   /  AST  31  /  ALT  56[H]  /  AlkPhos  76  01-11    PTT - ( 11 Jan 2025 06:13 )  PTT:34.9 sec            Urinalysis Basic - ( 12 Jan 2025 06:42 )    Color: x / Appearance: x / SG: x / pH: x  Gluc: 226 mg/dL / Ketone: x  / Bili: x / Urobili: x   Blood: x / Protein: x / Nitrite: x   Leuk Esterase: x / RBC: x / WBC x   Sq Epi: x / Non Sq Epi: x / Bacteria: x        Medications:  acetaminophen     Tablet .. 650 milliGRAM(s) Oral once  aMIOdarone    Tablet 200 milliGRAM(s) Oral daily  apixaban 5 milliGRAM(s) Oral every 12 hours  atorvastatin 40 milliGRAM(s) Oral at bedtime  chlorhexidine 2% Cloths 1 Application(s) Topical <User Schedule>  clopidogrel Tablet 75 milliGRAM(s) Oral daily  dextrose 50% Injectable 25 Gram(s) IV Push once  dextrose 50% Injectable 12.5 Gram(s) IV Push once  dextrose 50% Injectable 25 Gram(s) IV Push once  ezetimibe 10 milliGRAM(s) Oral daily  fenofibrate Tablet 145 milliGRAM(s) Oral daily  glucagon  Injectable 1 milliGRAM(s) IntraMuscular once  insulin glargine Injectable (LANTUS) 10 Unit(s) SubCutaneous at bedtime  insulin lispro (ADMELOG) corrective regimen sliding scale   SubCutaneous three times a day before meals  metoprolol tartrate 75 milliGRAM(s) Oral every 6 hours  polyethylene glycol 3350 17 Gram(s) Oral two times a day  QUEtiapine 12.5 milliGRAM(s) Oral at bedtime  senna 2 Tablet(s) Oral at bedtime    Drips:  dextrose 5%. 1000 milliLiter(s) (50 mL/Hr) IV Continuous <Continuous>    PRN:     Allergies    sulfa drugs (Unknown)    Intolerances

## 2025-01-12 NOTE — PROGRESS NOTE ADULT - SUBJECTIVE AND OBJECTIVE BOX
INTERVAL HPI/OVERNIGHT EVENTS:  doing better  still diuresing    MEDICATIONS  (STANDING):  aMIOdarone    Tablet 200 milliGRAM(s) Oral daily  apixaban 5 milliGRAM(s) Oral every 12 hours  atorvastatin 40 milliGRAM(s) Oral at bedtime  chlorhexidine 2% Cloths 1 Application(s) Topical <User Schedule>  clopidogrel Tablet 75 milliGRAM(s) Oral daily  dextrose 5%. 1000 milliLiter(s) (50 mL/Hr) IV Continuous <Continuous>  dextrose 50% Injectable 25 Gram(s) IV Push once  dextrose 50% Injectable 12.5 Gram(s) IV Push once  dextrose 50% Injectable 25 Gram(s) IV Push once  ezetimibe 10 milliGRAM(s) Oral daily  fenofibrate Tablet 145 milliGRAM(s) Oral daily  furosemide   Injectable 40 milliGRAM(s) IV Push once  glucagon  Injectable 1 milliGRAM(s) IntraMuscular once  insulin glargine Injectable (LANTUS) 10 Unit(s) SubCutaneous at bedtime  insulin lispro (ADMELOG) corrective regimen sliding scale   SubCutaneous three times a day before meals  metoprolol tartrate 75 milliGRAM(s) Oral every 6 hours  polyethylene glycol 3350 17 Gram(s) Oral two times a day  QUEtiapine 12.5 milliGRAM(s) Oral at bedtime  senna 2 Tablet(s) Oral at bedtime    MEDICATIONS  (PRN):  albuterol/ipratropium for Nebulization 3 milliLiter(s) Nebulizer every 6 hours PRN Shortness of Breath and/or Wheezing  dextrose Oral Gel 15 Gram(s) Oral once PRN Blood Glucose LESS THAN 70 milliGRAM(s)/deciliter  melatonin 3 milliGRAM(s) Oral at bedtime PRN Insomnia      Allergies    sulfa drugs (Unknown)    Intolerances        REVIEW OF SYSTEMS    [ x] A ten-point review of systems was otherwise negative except as noted.  [ ] Due to altered mental status/intubation, subjective information were not able to be obtained from the patient. History was obtained, to the extent possible, from review of the chart and collateral sources of information.      Vital Signs Last 24 Hrs  T(C): 36.7 (12 Jan 2025 08:14), Max: 36.7 (12 Jan 2025 08:14)  T(F): 98 (12 Jan 2025 08:14), Max: 98 (12 Jan 2025 08:14)  HR: 86 (12 Jan 2025 08:14) (80 - 91)  BP: 121/71 (12 Jan 2025 08:14) (105/60 - 156/90)  BP(mean): 85 (12 Jan 2025 08:14) (79 - 115)  RR: 22 (12 Jan 2025 08:14) (20 - 25)  SpO2: 98% (12 Jan 2025 08:14) (98% - 100%)    Parameters below as of 12 Jan 2025 08:14  Patient On (Oxygen Delivery Method): nasal cannula  O2 Flow (L/min): 3      01-11-25 @ 07:01  -  01-12-25 @ 07:00  --------------------------------------------------------  IN: 341 mL / OUT: 1100 mL / NET: -759 mL    01-12-25 @ 07:01 - 01-12-25 @ 14:32  --------------------------------------------------------  IN: 0 mL / OUT: 500 mL / NET: -500 mL        PHYSICAL EXAM:    GENERAL: In no apparent distress, well nourished, and hydrated.  HEART: Regular rate and rhythm; No murmur; NO rubs, or gallops.  PULMONARY: Clear to auscultation and percussion.  Normal expansion/effort. No rales, wheezing, or rhonchi bilaterally.  ABDOMEN: Soft, Nontender, Nondistended; Bowel sounds present  EXTREMITIES:  Extremities warm, pink, well-perfused, 2+ Peripheral Pulses, No clubbing, cyanosis, or edema  NEUROLOGICAL: alert & oriented x 3, no focal deficits, SLAVA GARCIAMI    LABS:                        13.2   17.21 )-----------( 349      ( 12 Jan 2025 06:42 )             40.8     01-12    139  |  97[L]  |  30[H]  ----------------------------<  226[H]  4.1   |  29  |  1.6[H]    Ca    9.3      12 Jan 2025 06:42  Mg     1.9     01-12    TPro  6.0  /  Alb  3.4[L]  /  TBili  0.8  /  DBili  x   /  AST  31  /  ALT  56[H]  /  AlkPhos  76  01-11    PTT - ( 11 Jan 2025 06:13 )  PTT:34.9 sec        12 Lead ECG:   Ventricular Rate 74 BPM    Atrial Rate 277 BPM    QRS Duration 154 ms    Q-T Interval 492 ms    QTC Calculation(Bazett) 546 ms    R Axis 246 degrees    T Axis 71 degrees    Diagnosis Line Ventricular-paced rhythm  Biventricular pacemaker detected  Abnormal ECG    Confirmed by JESSICA TIAN MD [id number]) on 1/10/2025 7:03:16 AM (01-09-25 @ 22:44)      RADIOLOGY & ADDITIONAL TESTS:

## 2025-01-12 NOTE — PROGRESS NOTE ADULT - ASSESSMENT
EP: Deandre    69-year-old male with past medical history of chronic HFrEF s/p ICD, AVR porcine, HTN, PAF s/p ablation, CAD s/p PCI, DM, HLD, COPD (no home O2), smoker presenting with worsening shortness of breath and weakness with concern for acute on chronic CHF exacerbation in the setting of persistent afib. 12/31 pt went into flash pulm edema requiring intubation. Now extubated and cleared for ablation.  was transferred to [place] for further care  being duresed in anticipation for ablation likely Tue if clinically ready    Impression:  #Persistent AF  #ICM s/p CRT-D  #Bioprosthetic AV  #Acute CHF exacerbation      con't tele  con't diuresis  Maintain electrolytes K>4.0 Mg >2.0, monitor closely  Will plan ablation next week, tentatively Tue if patient is optimized   Continue metoprolol 75 Q 6, will change to XL on discharge  Continue amiodarone 200 mg q24h- discontinue if LFTs increase - currently downtrending  Con;t Eliquis 5 mg PO BID hold it Tue AM  will follow

## 2025-01-13 LAB
ANION GAP SERPL CALC-SCNC: 11 MMOL/L — SIGNIFICANT CHANGE UP (ref 7–14)
BUN SERPL-MCNC: 26 MG/DL — HIGH (ref 10–20)
CALCIUM SERPL-MCNC: 9.5 MG/DL — SIGNIFICANT CHANGE UP (ref 8.4–10.4)
CHLORIDE SERPL-SCNC: 99 MMOL/L — SIGNIFICANT CHANGE UP (ref 98–110)
CO2 SERPL-SCNC: 29 MMOL/L — SIGNIFICANT CHANGE UP (ref 17–32)
CREAT SERPL-MCNC: 1.6 MG/DL — HIGH (ref 0.7–1.5)
EGFR: 46 ML/MIN/1.73M2 — LOW
GLUCOSE BLDC GLUCOMTR-MCNC: 151 MG/DL — HIGH (ref 70–99)
GLUCOSE BLDC GLUCOMTR-MCNC: 163 MG/DL — HIGH (ref 70–99)
GLUCOSE BLDC GLUCOMTR-MCNC: 178 MG/DL — HIGH (ref 70–99)
GLUCOSE BLDC GLUCOMTR-MCNC: 326 MG/DL — HIGH (ref 70–99)
GLUCOSE SERPL-MCNC: 181 MG/DL — HIGH (ref 70–99)
HCT VFR BLD CALC: 42.8 % — SIGNIFICANT CHANGE UP (ref 42–52)
HGB BLD-MCNC: 13.9 G/DL — LOW (ref 14–18)
MAGNESIUM SERPL-MCNC: 1.9 MG/DL — SIGNIFICANT CHANGE UP (ref 1.8–2.4)
MCHC RBC-ENTMCNC: 29.5 PG — SIGNIFICANT CHANGE UP (ref 27–31)
MCHC RBC-ENTMCNC: 32.5 G/DL — SIGNIFICANT CHANGE UP (ref 32–37)
MCV RBC AUTO: 90.9 FL — SIGNIFICANT CHANGE UP (ref 80–94)
NRBC # BLD: 0 /100 WBCS — SIGNIFICANT CHANGE UP (ref 0–0)
PLATELET # BLD AUTO: 328 K/UL — SIGNIFICANT CHANGE UP (ref 130–400)
PMV BLD: 11.8 FL — HIGH (ref 7.4–10.4)
POTASSIUM SERPL-MCNC: 4.1 MMOL/L — SIGNIFICANT CHANGE UP (ref 3.5–5)
POTASSIUM SERPL-SCNC: 4.1 MMOL/L — SIGNIFICANT CHANGE UP (ref 3.5–5)
RBC # BLD: 4.71 M/UL — SIGNIFICANT CHANGE UP (ref 4.7–6.1)
RBC # FLD: 13.9 % — SIGNIFICANT CHANGE UP (ref 11.5–14.5)
SODIUM SERPL-SCNC: 139 MMOL/L — SIGNIFICANT CHANGE UP (ref 135–146)
WBC # BLD: 13.25 K/UL — HIGH (ref 4.8–10.8)
WBC # FLD AUTO: 13.25 K/UL — HIGH (ref 4.8–10.8)

## 2025-01-13 PROCEDURE — 71045 X-RAY EXAM CHEST 1 VIEW: CPT | Mod: 26

## 2025-01-13 PROCEDURE — 93010 ELECTROCARDIOGRAM REPORT: CPT

## 2025-01-13 PROCEDURE — 99232 SBSQ HOSP IP/OBS MODERATE 35: CPT

## 2025-01-13 RX ORDER — INSULIN LISPRO 100/ML
VIAL (ML) SUBCUTANEOUS AT BEDTIME
Refills: 0 | Status: DISCONTINUED | OUTPATIENT
Start: 2025-01-13 | End: 2025-01-16

## 2025-01-13 RX ORDER — INSULIN GLARGINE-YFGN 100 [IU]/ML
5 INJECTION, SOLUTION SUBCUTANEOUS ONCE
Refills: 0 | Status: COMPLETED | OUTPATIENT
Start: 2025-01-13 | End: 2025-01-13

## 2025-01-13 RX ORDER — APIXABAN 5 MG/1
5 TABLET, FILM COATED ORAL ONCE
Refills: 0 | Status: COMPLETED | OUTPATIENT
Start: 2025-01-13 | End: 2025-01-13

## 2025-01-13 RX ORDER — INSULIN GLARGINE-YFGN 100 [IU]/ML
10 INJECTION, SOLUTION SUBCUTANEOUS AT BEDTIME
Refills: 0 | Status: DISCONTINUED | OUTPATIENT
Start: 2025-01-14 | End: 2025-01-16

## 2025-01-13 RX ORDER — SIMETHICONE 125 MG/1
80 CAPSULE, LIQUID FILLED ORAL ONCE
Refills: 0 | Status: COMPLETED | OUTPATIENT
Start: 2025-01-13 | End: 2025-01-13

## 2025-01-13 RX ORDER — INSULIN GLARGINE-YFGN 100 [IU]/ML
5 INJECTION, SOLUTION SUBCUTANEOUS ONCE
Refills: 0 | Status: DISCONTINUED | OUTPATIENT
Start: 2025-01-13 | End: 2025-01-13

## 2025-01-13 RX ADMIN — QUETIAPINE FUMARATE 12.5 MILLIGRAM(S): 100 TABLET, FILM COATED ORAL at 21:14

## 2025-01-13 RX ADMIN — Medication 75 MILLIGRAM(S): at 11:55

## 2025-01-13 RX ADMIN — Medication 17 GRAM(S): at 05:07

## 2025-01-13 RX ADMIN — APIXABAN 5 MILLIGRAM(S): 5 TABLET, FILM COATED ORAL at 17:29

## 2025-01-13 RX ADMIN — Medication 1: at 08:44

## 2025-01-13 RX ADMIN — Medication 2: at 21:12

## 2025-01-13 RX ADMIN — FENOFIBRATE 145 MILLIGRAM(S): 48 TABLET ORAL at 11:56

## 2025-01-13 RX ADMIN — AMIODARONE HYDROCHLORIDE 200 MILLIGRAM(S): 200 TABLET ORAL at 05:08

## 2025-01-13 RX ADMIN — INSULIN GLARGINE-YFGN 5 UNIT(S): 100 INJECTION, SOLUTION SUBCUTANEOUS at 21:13

## 2025-01-13 RX ADMIN — Medication 17 GRAM(S): at 17:26

## 2025-01-13 RX ADMIN — CLOPIDOGREL BISULFATE 75 MILLIGRAM(S): 75 TABLET, FILM COATED ORAL at 11:56

## 2025-01-13 RX ADMIN — Medication 75 MILLIGRAM(S): at 05:08

## 2025-01-13 RX ADMIN — ATORVASTATIN CALCIUM 40 MILLIGRAM(S): 40 TABLET, FILM COATED ORAL at 21:14

## 2025-01-13 RX ADMIN — APIXABAN 5 MILLIGRAM(S): 5 TABLET, FILM COATED ORAL at 05:08

## 2025-01-13 RX ADMIN — SIMETHICONE 80 MILLIGRAM(S): 125 CAPSULE, LIQUID FILLED ORAL at 17:28

## 2025-01-13 RX ADMIN — CHLORHEXIDINE GLUCONATE 1 APPLICATION(S): 1.2 RINSE ORAL at 05:11

## 2025-01-13 RX ADMIN — Medication 1: at 17:28

## 2025-01-13 RX ADMIN — Medication 1: at 11:57

## 2025-01-13 RX ADMIN — Medication 75 MILLIGRAM(S): at 17:26

## 2025-01-13 RX ADMIN — EZETIMIBE 10 MILLIGRAM(S): 10 TABLET ORAL at 11:56

## 2025-01-13 NOTE — PROGRESS NOTE ADULT - ASSESSMENT
EP: Deandre    69-year-old male with past medical history of chronic HFrEF s/p ICD, AVR porcine, HTN, PAF s/p ablation, CAD s/p PCI, DM, HLD, COPD (no home O2), smoker presenting with worsening shortness of breath and weakness with concern for acute on chronic CHF exacerbation in the setting of persistent afib. 12/31 pt went into flash pulm edema requiring intubation. Now extubated and cleared for ablation.  was transferred to [place] for further care  was duresed  ablation tomorrow    Impression:  #Persistent AF  #ICM s/p CRT-D  #Bioprosthetic AV  #Acute CHF exacerbation      con't tele  plan for ablation tomorrow   NPO after MN  please send full set of labs including coags and T&S tonight or AM  please give Eliquis by 5pm today and hold AM dose  hold Metoprolol and Amiodarone in AM  Maintain electrolytes K>4.0 Mg >2.0, monitor closely  will follow   EP: Deandre    69-year-old male with past medical history of chronic HFrEF s/p ICD, AVR porcine, HTN, PAF s/p ablation, CAD s/p PCI, DM, HLD, COPD (no home O2), smoker presenting with worsening shortness of breath and weakness with concern for acute on chronic CHF exacerbation in the setting of persistent afib. 12/31 pt went into flash pulm edema requiring intubation. Now extubated and cleared for ablation.  was transferred to [place] for further care  was diuresed  ablation tomorrow    Impression:  #Persistent AF  #ICM s/p CRT-D  #Bioprosthetic AV  #Acute CHF exacerbation      con't tele  plan for ablation tomorrow   NPO after MN  please send full set of labs including coags and T&S tonight or AM  please give Eliquis by 5pm today and hold AM dose  hold Metoprolol and Amiodarone in AM  Maintain electrolytes K>4.0 Mg >2.0, monitor closely  will follow

## 2025-01-13 NOTE — PROGRESS NOTE ADULT - SUBJECTIVE AND OBJECTIVE BOX
INTERVAL HPI/OVERNIGHT EVENTS:  improved, ready for procedure tomorrow    MEDICATIONS  (STANDING):  apixaban 5 milliGRAM(s) Oral once  atorvastatin 40 milliGRAM(s) Oral at bedtime  chlorhexidine 2% Cloths 1 Application(s) Topical <User Schedule>  clopidogrel Tablet 75 milliGRAM(s) Oral daily  dextrose 5%. 1000 milliLiter(s) (50 mL/Hr) IV Continuous <Continuous>  dextrose 50% Injectable 25 Gram(s) IV Push once  dextrose 50% Injectable 12.5 Gram(s) IV Push once  dextrose 50% Injectable 25 Gram(s) IV Push once  ezetimibe 10 milliGRAM(s) Oral daily  fenofibrate Tablet 145 milliGRAM(s) Oral daily  glucagon  Injectable 1 milliGRAM(s) IntraMuscular once  insulin glargine Injectable (LANTUS) 10 Unit(s) SubCutaneous at bedtime  insulin lispro (ADMELOG) corrective regimen sliding scale   SubCutaneous three times a day before meals  metoprolol tartrate 75 milliGRAM(s) Oral every 6 hours  polyethylene glycol 3350 17 Gram(s) Oral two times a day  QUEtiapine 12.5 milliGRAM(s) Oral at bedtime  senna 2 Tablet(s) Oral at bedtime    MEDICATIONS  (PRN):  albuterol/ipratropium for Nebulization 3 milliLiter(s) Nebulizer every 6 hours PRN Shortness of Breath and/or Wheezing  dextrose Oral Gel 15 Gram(s) Oral once PRN Blood Glucose LESS THAN 70 milliGRAM(s)/deciliter  melatonin 3 milliGRAM(s) Oral at bedtime PRN Insomnia      Allergies    sulfa drugs (Unknown)    Intolerances        REVIEW OF SYSTEMS    [ x] A ten-point review of systems was otherwise negative except as noted.  [ ] Due to altered mental status/intubation, subjective information were not able to be obtained from the patient. History was obtained, to the extent possible, from review of the chart and collateral sources of information.      Vital Signs Last 24 Hrs  T(C): 37 (12 Jan 2025 23:24), Max: 37 (12 Jan 2025 23:24)  T(F): 98.6 (12 Jan 2025 23:24), Max: 98.6 (12 Jan 2025 23:24)  HR: 96 (13 Jan 2025 08:14) (94 - 113)  BP: 150/70 (13 Jan 2025 08:14) (125/96 - 150/70)  BP(mean): 94 (13 Jan 2025 08:14) (92 - 107)  RR: 20 (13 Jan 2025 08:14) (20 - 24)  SpO2: 98% (13 Jan 2025 08:14) (95% - 99%)    Parameters below as of 13 Jan 2025 08:14  Patient On (Oxygen Delivery Method): nasal cannula  O2 Flow (L/min): 2      01-12-25 @ 07:01  -  01-13-25 @ 07:00  --------------------------------------------------------  IN: 0 mL / OUT: 1425 mL / NET: -1425 mL        PHYSICAL EXAM:    GENERAL: In no apparent distress, well nourished, and hydrated.  HEART: irRegular rate and rhythm; No murmur; NO rubs, or gallops.  PULMONARY: Clear to auscultation and percussion.  Normal expansion/effort. No rales, wheezing, or rhonchi bilaterally.  ABDOMEN: Soft, Nontender, Nondistended; Bowel sounds present  EXTREMITIES:  Extremities warm, pink, well-perfused, 2+ Peripheral Pulses, No clubbing, cyanosis, or edema  NEUROLOGICAL: alert & oriented x 3, no focal deficits, PERRLA, EOMI    LABS:                        13.9   13.25 )-----------( 328      ( 13 Jan 2025 06:46 )             42.8     01-13    139  |  99  |  26[H]  ----------------------------<  181[H]  4.1   |  29  |  1.6[H]    Ca    9.5      13 Jan 2025 06:46  Mg     1.9     01-13              12 Lead ECG:   Ventricular Rate 89 BPM    Atrial Rate 104 BPM    QRS Duration 160 ms    Q-T Interval 466 ms    QTC Calculation(Bazett) 566 ms    P Axis 60 degrees    R Axis 211 degrees    T Axis 78 degrees    Diagnosis Line Ventricular-paced rhythm  Abnormal ECG    Confirmed by LADARIUS MENDOZA, SAVANA [id number]) on 1/12/2025 11:20:52 PM (01-12-25 @ 08:08)      RADIOLOGY & ADDITIONAL TESTS:       INTERVAL HPI/OVERNIGHT EVENTS:  improved, ready for procedure tomorrow    MEDICATIONS  (STANDING):  apixaban 5 milliGRAM(s) Oral once  atorvastatin 40 milliGRAM(s) Oral at bedtime  chlorhexidine 2% Cloths 1 Application(s) Topical <User Schedule>  clopidogrel Tablet 75 milliGRAM(s) Oral daily  dextrose 5%. 1000 milliLiter(s) (50 mL/Hr) IV Continuous <Continuous>  dextrose 50% Injectable 25 Gram(s) IV Push once  dextrose 50% Injectable 12.5 Gram(s) IV Push once  dextrose 50% Injectable 25 Gram(s) IV Push once  ezetimibe 10 milliGRAM(s) Oral daily  fenofibrate Tablet 145 milliGRAM(s) Oral daily  glucagon  Injectable 1 milliGRAM(s) IntraMuscular once  insulin glargine Injectable (LANTUS) 10 Unit(s) SubCutaneous at bedtime  insulin lispro (ADMELOG) corrective regimen sliding scale   SubCutaneous three times a day before meals  metoprolol tartrate 75 milliGRAM(s) Oral every 6 hours  polyethylene glycol 3350 17 Gram(s) Oral two times a day  QUEtiapine 12.5 milliGRAM(s) Oral at bedtime  senna 2 Tablet(s) Oral at bedtime    MEDICATIONS  (PRN):  albuterol/ipratropium for Nebulization 3 milliLiter(s) Nebulizer every 6 hours PRN Shortness of Breath and/or Wheezing  dextrose Oral Gel 15 Gram(s) Oral once PRN Blood Glucose LESS THAN 70 milliGRAM(s)/deciliter  melatonin 3 milliGRAM(s) Oral at bedtime PRN Insomnia      Allergies    sulfa drugs (Unknown)    Intolerances        REVIEW OF SYSTEMS    [ x] A ten-point review of systems was otherwise negative except as noted.  [ ] Due to altered mental status/intubation, subjective information were not able to be obtained from the patient. History was obtained, to the extent possible, from review of the chart and collateral sources of information.      Vital Signs Last 24 Hrs  T(C): 37 (12 Jan 2025 23:24), Max: 37 (12 Jan 2025 23:24)  T(F): 98.6 (12 Jan 2025 23:24), Max: 98.6 (12 Jan 2025 23:24)  HR: 96 (13 Jan 2025 08:14) (94 - 113)  BP: 150/70 (13 Jan 2025 08:14) (125/96 - 150/70)  BP(mean): 94 (13 Jan 2025 08:14) (92 - 107)  RR: 20 (13 Jan 2025 08:14) (20 - 24)  SpO2: 98% (13 Jan 2025 08:14) (95% - 99%)    Parameters below as of 13 Jan 2025 08:14  Patient On (Oxygen Delivery Method): nasal cannula  O2 Flow (L/min): 2      01-12-25 @ 07:01  -  01-13-25 @ 07:00  --------------------------------------------------------  IN: 0 mL / OUT: 1425 mL / NET: -1425 mL        PHYSICAL EXAM  GENERAL: In no apparent distress, well nourished, and hydrated.  HEART: irregular rate and rhythm; No murmur; NO rubs, or gallops.  PULMONARY: Clear to auscultation and percussion.  Normal expansion/effort. No rales, wheezing, or rhonchi bilaterally.  ABDOMEN: Soft, Nontender, Nondistended; Bowel sounds present  EXTREMITIES:  Extremities warm, pink, well-perfused, 2+ Peripheral Pulses, No clubbing, cyanosis, or edema  NEUROLOGICAL: alert & oriented x 2, no focal deficits, PERRLA, EOMI    LABS:                        13.9   13.25 )-----------( 328      ( 13 Jan 2025 06:46 )             42.8     01-13    139  |  99  |  26[H]  ----------------------------<  181[H]  4.1   |  29  |  1.6[H]    Ca    9.5      13 Jan 2025 06:46  Mg     1.9     01-13              12 Lead ECG:   Ventricular Rate 89 BPM    Atrial Rate 104 BPM    QRS Duration 160 ms    Q-T Interval 466 ms    QTC Calculation(Bazett) 566 ms    P Axis 60 degrees    R Axis 211 degrees    T Axis 78 degrees    Diagnosis Line Ventricular-paced rhythm  Abnormal ECG    Confirmed by LADARIUS MENDOZA, SAVANA [id number]) on 1/12/2025 11:20:52 PM (01-12-25 @ 08:08)      RADIOLOGY & ADDITIONAL TESTS:

## 2025-01-13 NOTE — PROGRESS NOTE ADULT - ASSESSMENT
ASSESSMENT  This is a 69-year-old male with past medical history of chronic HFrEF s/p ICD, AVR porcine, HTN, PAF s/p ablation, CAD s/p PCI, DM, HLD, COPD (no home O2), smoker presenting with worsening shortness of breath and weakness.  Patient was admitted within the last 24 hours for acute on chronic CHF exacerbation, was given IV Lasix and BiPAP, and left AMA.  Patient returns stating that he became more short of breath last night after leaving the hospital     IMPRESSION  #Acute hypoxic respiratory failure - intubation s/p seld extubation 1/4  #V tach/CHF Exacerbation   #Fevers from 12/31-1/4 - Treated with course of cefepime 12/31-1/8 for possible pnuemonia     #Leukocytosis   #s/p Aortic Bioprosthetic valve  #Heart failure with reduced EF, +Pacemaker  #KAREN-Improved.   #Transaminitis- Possibly from hypotension/shock-Resolved.   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    RECOMMENDATIONS  - CXR 1/11 reviewed -- improved compared to previous   - WBC downtrending and no fevers; procalcitonin < 0.25   - continue to monitor off antibiotics for now     Please call or message on [employer] Teams if with any questions.  Spectra [phone]

## 2025-01-13 NOTE — PROGRESS NOTE ADULT - NS ATTEND AMEND GEN_ALL_CORE FT
69yoM with iCM with LVEF 40-45% s/p CRT-D (2020), CAD with h/o PCI, porcine AVR, pAF (ablation in 4/2024, recently started on amiodarone for inappropriate shocks due to RVR therefore started on amiodarone), HTN, HLD, DM, COPD (not on home O2), and active tobacco use who was transferred from [facility] for Afib ablation, but found to be confused and with undifferentiated hypoxia.     Patient presented to [facility] on 12/26 but left AMA. He represented on 12/27 and was admitted for acute-on-chronic HFrEF. He was undergoing IV diuresis, but experienced flash pulmonary edema on 12/31 requiring intubation. Rhythm was Afib with RVR at the time. It is unclear if the rapid Afib caused pulmonary edema or if the patient developed worsening hypoxia which caused his HR to be elevated. While in the ICU, patient received Bumex 2 mg IV daily with robust UOP and prerenal KAREN. He was also treated with a 7-day course of abx for pneumonia. Patient self-extubated on 1/04 and was transitioned to EVAPS/BiPAP and eventually NC. Post extubation, he was confused and agitated requiring a Precedex gtt on 1/07 and 1/08.     Patient was transferred to [facility] on the evening on 1/09/25. On 1/10/25, his CXR showed bilateral fluffy opacities. T up to 99.2 and WBC above 20. His lung findings could have been aspiration pneumonitis in the setting of confusion, worsening pulmonary edema despite ongoing diuresis and RAP 3, and/or pneumonia. He has been diuresed overed the weekend and monitored off abx. CXR improving.     Plan:   - Lasix 40 mg IV x 1 today  - Will assess need for diuretics daily  - 1:1  - Seroquel 12.5 mg nightly for delirium  - Procalcitonin  - ID following  - On home Eliquis 5 mg BID, Plavix 75 mg daily, atorvastatin 40 mg nightly, Zetia 10 mg daily, and fenofibrate 145 mg daily   - On amiodarone 200 mg daily and Lopressor 75 mg q6h for rate control  - On Lantus 10 U at bedtime  - NPO at MN  - Ablation tomorrow

## 2025-01-13 NOTE — PROGRESS NOTE ADULT - SUBJECTIVE AND OBJECTIVE BOX
SERENAMONIQUE  69y, Male  Allergy: sulfa drugs (Unknown)      LOS  17d    CHIEF COMPLAINT: CHF exacerbation (12 Jan 2025 14:31)      INTERVAL EVENTS/HPI  - No acute events overnight  - T(F): , Max: 98.6 (01-12-25 @ 23:24)  - mostly sleeping this morning due to agitation over night   - WBC overall downtrending since weekend   - WBC Count: 13.25 (01-13-25 @ 06:46)  WBC Count: 17.21 (01-12-25 @ 06:42)     - Creatinine: 1.6 (01-13-25 @ 06:46)  Creatinine: 1.6 (01-12-25 @ 06:42)       ROS  Limited ROS due to mental status     VITALS:  T(F): 98.6, Max: 98.6 (01-12-25 @ 23:24)  HR: 96  BP: 150/70  RR: 20Vital Signs Last 24 Hrs  T(C): 37 (12 Jan 2025 23:24), Max: 37 (12 Jan 2025 23:24)  T(F): 98.6 (12 Jan 2025 23:24), Max: 98.6 (12 Jan 2025 23:24)  HR: 96 (13 Jan 2025 08:14) (86 - 113)  BP: 150/70 (13 Jan 2025 08:14) (97/57 - 150/70)  BP(mean): 94 (13 Jan 2025 08:14) (73 - 107)  RR: 20 (13 Jan 2025 08:14) (20 - 24)  SpO2: 98% (13 Jan 2025 08:14) (95% - 99%)    Parameters below as of 13 Jan 2025 08:14  Patient On (Oxygen Delivery Method): nasal cannula  O2 Flow (L/min): 2      PHYSICAL EXAM:  Gen: NAD, resting in bed  HEENT: Normocephalic, atraumatic  Neck: supple, no lymphadenopathy  CV: Regular rate & regular rhythm  Lungs: decreased BS at bases, no fremitus  Abdomen: Soft, BS present  Ext: Warm, well perfused  Neuro: non focal, awake  Skin: no rash, no erythema  Lines: no phlebitis    FH: Non-contributory  Social Hx: Non-contributory    TESTS & MEASUREMENTS:                        13.9   13.25 )-----------( 328      ( 13 Jan 2025 06:46 )             42.8     01-13    139  |  99  |  26[H]  ----------------------------<  181[H]  4.1   |  29  |  1.6[H]    Ca    9.5      13 Jan 2025 06:46  Mg     1.9     01-13          Urinalysis Basic - ( 13 Jan 2025 06:46 )    Color: x / Appearance: x / SG: x / pH: x  Gluc: 181 mg/dL / Ketone: x  / Bili: x / Urobili: x   Blood: x / Protein: x / Nitrite: x   Leuk Esterase: x / RBC: x / WBC x   Sq Epi: x / Non Sq Epi: x / Bacteria: x        Culture - Blood (collected 12-31-24 @ 15:45)  Source: .Blood BLOOD  Final Report (01-06-25 @ 02:00):    No growth at 5 days        Lactate, Blood: 1.5 mmol/L (01-10-25 @ 05:31)      INFECTIOUS DISEASES TESTING  Rapid RVP Result: NotDetec (01-10-25 @ 15:31)  Procalcitonin: 0.12 (01-10-25 @ 07:40)  HIV-1/2 Combo Result: Nonreact (01-04-25 @ 05:58)  Procalcitonin: 0.68 (01-03-25 @ 05:34)  MRSA PCR Result.: Negative (01-02-25 @ 20:30)  Rapid RVP Result: NotDetec (01-02-25 @ 20:30)  MRSA PCR Result.: Negative (01-02-25 @ 05:00)  Procalcitonin: 0.04 (12-27-24 @ 12:18)      INFLAMMATORY MARKERS      RADIOLOGY & ADDITIONAL TESTS:  I have personally reviewed the last available Chest xray  CXR      CT      CARDIOLOGY TESTING  12 Lead ECG:   Ventricular Rate 89 BPM    Atrial Rate 104 BPM    QRS Duration 160 ms    Q-T Interval 466 ms    QTC Calculation(Bazett) 566 ms    P Axis 60 degrees    R Axis 211 degrees    T Axis 78 degrees    Diagnosis Line Ventricular-paced rhythm  Abnormal ECG    Confirmed by LADARIUS MENDOZA, SAVANA ([id number]) on 1/12/2025 11:20:52 PM (01-12-25 @ 08:08)  12 Lead ECG:   Ventricular Rate 74 BPM    Atrial Rate 277 BPM    QRS Duration 154 ms    Q-T Interval 492 ms    QTC Calculation(Bazett) 546 ms    R Axis 246 degrees    T Axis 71 degrees    Diagnosis Line Ventricular-paced rhythm  Biventricular pacemaker detected  Abnormal ECG    Confirmed by JESSICA TIAN MD ([id number]) on 1/10/2025 7:03:16 AM (01-09-25 @ 22:44)      MEDICATIONS  aMIOdarone    Tablet 200 Oral daily  apixaban 5 Oral every 12 hours  atorvastatin 40 Oral at bedtime  chlorhexidine 2% Cloths 1 Topical <User Schedule>  clopidogrel Tablet 75 Oral daily  dextrose 5%. 1000 IV Continuous <Continuous>  dextrose 50% Injectable 25 IV Push once  dextrose 50% Injectable 12.5 IV Push once  dextrose 50% Injectable 25 IV Push once  ezetimibe 10 Oral daily  fenofibrate Tablet 145 Oral daily  glucagon  Injectable 1 IntraMuscular once  insulin glargine Injectable (LANTUS) 10 SubCutaneous at bedtime  insulin lispro (ADMELOG) corrective regimen sliding scale  SubCutaneous three times a day before meals  metoprolol tartrate 75 Oral every 6 hours  polyethylene glycol 3350 17 Oral two times a day  QUEtiapine 12.5 Oral at bedtime  senna 2 Oral at bedtime      WEIGHT  Weight (kg): 82.1 (12-27-24 @ 16:33)  Creatinine: 1.6 mg/dL (01-13-25 @ 06:46)      ANTIBIOTICS:      All available historical records have been reviewed

## 2025-01-13 NOTE — PROGRESS NOTE ADULT - SUBJECTIVE AND OBJECTIVE BOX
Chief complaint: Patient is a 69y old  Male who presents with a chief complaint of CHF exacerbation (11 Jan 2025 11:54)    HPI:  This is a 69-year-old male with past medical history of chronic HFrEF s/p ICD, AVR porcine, HTN, PAF s/p ablation, CAD s/p PCI, DM, HLD, COPD (no home O2), smoker presenting with worsening shortness of breath and weakness.  Patient was admitted within the last 24 hours for acute on chronic CHF exacerbation, was given IV Lasix and BiPAP, and left AMA.  Patient returns stating that he became more short of breath last night after leaving the hospital and would like to be readmitted for treatment.  Denies fever/chills, chest pain, abdominal pain, calf pain, N/V/D/C, cough, and nasal congestion.     [facility]/Tele/ICU  Course:  Patient admitted for acute on chronic systolic CHF vs Pneumonia. initial BNP 5K, leukocytosis, no fevers. CXR with bilateral lung opacities. Diuresed with Lasix 40mg, received IV ceftriaxone and azithromycin. TTE with EF 30-35%. On 12/31/24, a rapid response was called for tachycardia, chest pain, and hypoxia. Patient was noted to have multiple runs of Vtach on monitor. EKG with ventricular paced rhythm, O2 improved with supplemental oxygen, IV lasix and IV lopressor 5mg given, stabilized. Another rapid was called that same evening, with patient becoming more tachypneic, now hypoxic. CXR showed worsening fluid overload/flash pulmonary edema. Patient was becoming more somnolent and with increased labored breathing. [facility] discussions had with son Neto, patient remained full CODE. Decision was made to intubate patient and upgraded to ICU. Patient ultimately self extubated 1/4/2024 while doing spontaneous breathing trial. Remained off mechanical ventilation and was transitioned to bipap.  Finish course of cefepime 1/7/2025. After that monitor off abx. He was evaluated by EP who recommended transfer to [facility] for plan of ablation.     Interval history: Patient seen and examined at bedside. saw this AM, was alert and oriented to place and person, reported he did not sleep last night    1:1 sit remains in place  c/o hiccups   for ablation tomorrow      Review of systems: A complete 10-point review of systems was obtained and is negative except as stated in the interval history.    Vitals:  Vital Signs Last 24 Hrs  T(C): 36.6 (13 Jan 2025 15:10), Max: 37 (12 Jan 2025 23:24)  T(F): 97.8 (13 Jan 2025 15:10), Max: 98.6 (12 Jan 2025 23:24)  HR: 90 (13 Jan 2025 15:10) (90 - 113)  BP: 141/72 (13 Jan 2025 15:10) (125/96 - 150/70)  BP(mean): 99 (13 Jan 2025 15:10) (92 - 107)  RR: 20 (13 Jan 2025 15:10) (20 - 24)  SpO2: 93% (13 Jan 2025 15:10) (93% - 99%)    Parameters below as of 13 Jan 2025 08:14  Patient On (Oxygen Delivery Method): nasal cannula  O2 Flow (L/min): 2      Ins & outs:     01-09 @ 07:01  -  01-10 @ 07:00  --------------------------------------------------------  IN: 1279 mL / OUT: 1801 mL / NET: -522 mL    01-10 @ 07:01 - 01-11 @ 07:00  --------------------------------------------------------  IN: 52 mL / OUT: 1200 mL / NET: -1148 mL    01-11 @ 07:01 - 01-12 @ 07:00  --------------------------------------------------------  IN: 341 mL / OUT: 1100 mL / NET: -759 mL    01-12 @ 07:01 - 01-12 @ 11:08  --------------------------------------------------------  IN: 0 mL / OUT: 500 mL / NET: -500 mL      I&O's Summary    12 Jan 2025 07:01  -  13 Jan 2025 07:00  --------------------------------------------------------  IN: 0 mL / OUT: 1425 mL / NET: -1425 mL        Weight trend:      Physical exam:  General:  NAD  HEENT: Anicteric sclera. Moist mucous membranes.   Cardiac: Regular rate and rhythm. No murmurs, rubs, or gallops.   Vascular: Symmetric radial pulses. Dorsalis pedis pulses palpable.   Respiratory: Normal effort. Clear to ascultation.   Abdomen: Soft, nontender. Audible bowel sounds.   Extremities: Warm without edema. No cyanosis or clubbing.   Skin: Warm and dry. No rash.   Neurologic: Grossly normal motor function.   Psychiatric: Oriented to person, place, and time.     Data reviewed:  -  Telemetry: V paced nsr hr 80 bpm    - ECG 1/9th)   (Ventricular Rate 74 BPM    Atrial Rate 277 BPM    QRS Duration 154 ms    Q-T Interval 492 ms    QTC Calculation(Bazett) 546 ms    R Axis 246 degrees    T Axis 71 degrees    Diagnosis Line Ventricular-paced rhythm  Biventricular pacemaker detected  Abnormal ECG      - Echo (12/27)    1. Left ventricular ejection fraction, by visual estimation, is 40 to   45%.   2. Mildly enlarged left atrium.   3. Mild mitral annular calcification.   4. Mild mitral valve regurgitation.   5. Mild tricuspid regurgitation.   6. Bioprosthesis in the aortic position.   7. Ascending aorta 3.7 cm.   8. Estimated pulmonary artery systolic pressure is 35.7 mmHg assuming a   right atrial pressure of 3 mmHg, which is consistent with borderline   pulmonary hypertension.    - Radiology:    CXR:   Increased bilateral opacities/effusions.    - Labs:                        13.9   13.25 )-----------( 328      ( 13 Jan 2025 06:46 )             42.8     01-13    139  |  99  |  26[H]  ----------------------------<  181[H]  4.1   |  29  |  1.6[H]    Ca    9.5      13 Jan 2025 06:46  Mg     1.9     01-13                  Lactate Trend  01-10 @ 05:31 Lactate:1.5     Urinalysis Basic - ( 13 Jan 2025 06:46 )    Color: x / Appearance: x / SG: x / pH: x  Gluc: 181 mg/dL / Ketone: x  / Bili: x / Urobili: x   Blood: x / Protein: x / Nitrite: x   Leuk Esterase: x / RBC: x / WBC x   Sq Epi: x / Non Sq Epi: x / Bacteria: x      - Labs:  - Telemetry:  - ECG (date***):  - Echo (date***):  - Radiology:    - Labs:                        13.9   13.25 )-----------( 328      ( 13 Jan 2025 06:46 )             42.8     01-13    139  |  99  |  26[H]  ----------------------------<  181[H]  4.1   |  29  |  1.6[H]    Ca    9.5      13 Jan 2025 06:46  Mg     1.9     01-13                  Lactate Trend  01-10 @ 05:31 Lactate:1.5     Urinalysis Basic - ( 13 Jan 2025 06:46 )    Color: x / Appearance: x / SG: x / pH: x  Gluc: 181 mg/dL / Ketone: x  / Bili: x / Urobili: x   Blood: x / Protein: x / Nitrite: x   Leuk Esterase: x / RBC: x / WBC x   Sq Epi: x / Non Sq Epi: x / Bacteria: x                           Medications:  MEDICATIONS  (STANDING):  atorvastatin 40 milliGRAM(s) Oral at bedtime  chlorhexidine 2% Cloths 1 Application(s) Topical <User Schedule>  clopidogrel Tablet 75 milliGRAM(s) Oral daily  dextrose 5%. 1000 milliLiter(s) (50 mL/Hr) IV Continuous <Continuous>  dextrose 50% Injectable 25 Gram(s) IV Push once  dextrose 50% Injectable 12.5 Gram(s) IV Push once  dextrose 50% Injectable 25 Gram(s) IV Push once  ezetimibe 10 milliGRAM(s) Oral daily  fenofibrate Tablet 145 milliGRAM(s) Oral daily  glucagon  Injectable 1 milliGRAM(s) IntraMuscular once  insulin glargine Injectable (LANTUS) 10 Unit(s) SubCutaneous at bedtime  insulin lispro (ADMELOG) corrective regimen sliding scale   SubCutaneous three times a day before meals  metoprolol tartrate 75 milliGRAM(s) Oral every 6 hours  polyethylene glycol 3350 17 Gram(s) Oral two times a day  QUEtiapine 12.5 milliGRAM(s) Oral at bedtime  senna 2 Tablet(s) Oral at bedtime    MEDICATIONS  (PRN):  albuterol/ipratropium for Nebulization 3 milliLiter(s) Nebulizer every 6 hours PRN Shortness of Breath and/or Wheezing  dextrose Oral Gel 15 Gram(s) Oral once PRN Blood Glucose LESS THAN 70 milliGRAM(s)/deciliter  melatonin 3 milliGRAM(s) Oral at bedtime PRN Insomnia      PRN:     Allergies    sulfa drugs (Unknown)    Intolerances

## 2025-01-13 NOTE — PROGRESS NOTE ADULT - ASSESSMENT
Assessment:  69-year-old male with past medical history of chronic HFrEF s/p ICD, AVR porcine, HTN, PAF s/p ablation, CAD s/p PCI, DM, HLD, COPD (no home O2), smoker presenting with worsening shortness of breath and weakness with concern for acute on chronic CHF exacerbation in the setting of persistent afib    Problems discussed and associated plan:    #Altered mental status   #toxic metabolic encephalopathy  While in [facility] , CT head negative   - received Seroquel 12.5mg last night, continue nightly   - initiate delirum precaution  - maintain constant observation   - fall precaution   -  obtain daily ECG to monitor qtc  -avoid further qtc prolonging agents  -  Speech and swallow: rec diet soft, bite size with thin liquids     # AFIB/aflutter  #Hx ICM s/p CRT-D  #Hx Bioprosthetic AV  - c/w amiodarone 200mg daily  , metoprolol 75mg q 6hrs (hold AM doses as per EP for ablation)  - NPO pMN 1/13  - Hold Eliquis tmrw morning       #Acute Hypoxemic Respiratory Failure due to pulmonary edema vs ? resp. infection   #Hx COPD   - CXR with worsening effusion &  opacity, ProBNP [id number]   - continue to wean off O2   - Lasix 40mg IV x1 once and re-evaluate on daily basis  - BMP daily checks  - obtain RVP   - WBC down, procalcitonin 0.12,  hold off on Abx   >> appreciate ID recs>>- if WBC worsening tomorrow, can restart cefepime 2g q 12 hours per ID   - recheck procalcitonin a 2 days if concern for infection   - CXR today     #KAREN   - trend renal functions  - discontinue iyer. f/u TOV   - Avoid nephrotoxic meds   -Monitor I & O     # Transaminitis ( LFTS improving) .  - sp GI consult while in [facility]   likely due to CHF  congestive hepatopathy   -monitor LFTs  -acute hepatitis panel WNL  -avoid hepatotoxic medications  -optimize cardiac status    Hx CVA  - c/w Plavix     Hx DM   - F.S ACHS   - c/w ISS   - Lantus 10 units QHS     #HLD  - C/w zetia 10mg daily   - c/w fenofibrate 145mg po daily , lipitor 40mg daily       Please contact me with any questions or concerns at [phone].

## 2025-01-14 LAB
ALBUMIN SERPL ELPH-MCNC: 3.3 G/DL — LOW (ref 3.5–5.2)
ALP SERPL-CCNC: 68 U/L — SIGNIFICANT CHANGE UP (ref 30–115)
ALT FLD-CCNC: 33 U/L — SIGNIFICANT CHANGE UP (ref 0–41)
ANION GAP SERPL CALC-SCNC: 12 MMOL/L — SIGNIFICANT CHANGE UP (ref 7–14)
APTT BLD: 38.1 SEC — SIGNIFICANT CHANGE UP (ref 27–39.2)
AST SERPL-CCNC: 31 U/L — SIGNIFICANT CHANGE UP (ref 0–41)
BASOPHILS # BLD AUTO: 0.06 K/UL — SIGNIFICANT CHANGE UP (ref 0–0.2)
BASOPHILS NFR BLD AUTO: 0.6 % — SIGNIFICANT CHANGE UP (ref 0–1)
BILIRUB DIRECT SERPL-MCNC: 0.2 MG/DL — SIGNIFICANT CHANGE UP (ref 0–0.3)
BILIRUB INDIRECT FLD-MCNC: 0.4 MG/DL — SIGNIFICANT CHANGE UP (ref 0.2–1.2)
BILIRUB SERPL-MCNC: 0.6 MG/DL — SIGNIFICANT CHANGE UP (ref 0.2–1.2)
BLD GP AB SCN SERPL QL: SIGNIFICANT CHANGE UP
BUN SERPL-MCNC: 21 MG/DL — HIGH (ref 10–20)
CALCIUM SERPL-MCNC: 9.3 MG/DL — SIGNIFICANT CHANGE UP (ref 8.4–10.5)
CHLORIDE SERPL-SCNC: 100 MMOL/L — SIGNIFICANT CHANGE UP (ref 98–110)
CO2 SERPL-SCNC: 27 MMOL/L — SIGNIFICANT CHANGE UP (ref 17–32)
CREAT SERPL-MCNC: 1.5 MG/DL — SIGNIFICANT CHANGE UP (ref 0.7–1.5)
EGFR: 50 ML/MIN/1.73M2 — LOW
EOSINOPHIL # BLD AUTO: 0.03 K/UL — SIGNIFICANT CHANGE UP (ref 0–0.7)
EOSINOPHIL NFR BLD AUTO: 0.3 % — SIGNIFICANT CHANGE UP (ref 0–8)
GLUCOSE BLDC GLUCOMTR-MCNC: 147 MG/DL — HIGH (ref 70–99)
GLUCOSE BLDC GLUCOMTR-MCNC: 160 MG/DL — HIGH (ref 70–99)
GLUCOSE SERPL-MCNC: 101 MG/DL — HIGH (ref 70–99)
HCT VFR BLD CALC: 40.7 % — LOW (ref 42–52)
HGB BLD-MCNC: 13.1 G/DL — LOW (ref 14–18)
IMM GRANULOCYTES NFR BLD AUTO: 1 % — HIGH (ref 0.1–0.3)
INR BLD: 1.92 RATIO — HIGH (ref 0.65–1.3)
LYMPHOCYTES # BLD AUTO: 1.16 K/UL — LOW (ref 1.2–3.4)
LYMPHOCYTES # BLD AUTO: 11.2 % — LOW (ref 20.5–51.1)
MAGNESIUM SERPL-MCNC: 1.8 MG/DL — SIGNIFICANT CHANGE UP (ref 1.8–2.4)
MCHC RBC-ENTMCNC: 29.2 PG — SIGNIFICANT CHANGE UP (ref 27–31)
MCHC RBC-ENTMCNC: 32.2 G/DL — SIGNIFICANT CHANGE UP (ref 32–37)
MCV RBC AUTO: 90.8 FL — SIGNIFICANT CHANGE UP (ref 80–94)
MONOCYTES # BLD AUTO: 1.26 K/UL — HIGH (ref 0.1–0.6)
MONOCYTES NFR BLD AUTO: 12.2 % — HIGH (ref 1.7–9.3)
NEUTROPHILS # BLD AUTO: 7.72 K/UL — HIGH (ref 1.4–6.5)
NEUTROPHILS NFR BLD AUTO: 74.7 % — SIGNIFICANT CHANGE UP (ref 42.2–75.2)
NRBC # BLD: 0 /100 WBCS — SIGNIFICANT CHANGE UP (ref 0–0)
PLATELET # BLD AUTO: 317 K/UL — SIGNIFICANT CHANGE UP (ref 130–400)
PMV BLD: 11.9 FL — HIGH (ref 7.4–10.4)
POTASSIUM SERPL-MCNC: 4.5 MMOL/L — SIGNIFICANT CHANGE UP (ref 3.5–5)
POTASSIUM SERPL-SCNC: 4.5 MMOL/L — SIGNIFICANT CHANGE UP (ref 3.5–5)
PROT SERPL-MCNC: 5.7 G/DL — LOW (ref 6–8)
PROTHROM AB SERPL-ACNC: 23 SEC — HIGH (ref 9.95–12.87)
RBC # BLD: 4.48 M/UL — LOW (ref 4.7–6.1)
RBC # FLD: 14.1 % — SIGNIFICANT CHANGE UP (ref 11.5–14.5)
SODIUM SERPL-SCNC: 139 MMOL/L — SIGNIFICANT CHANGE UP (ref 135–146)
WBC # BLD: 10.33 K/UL — SIGNIFICANT CHANGE UP (ref 4.8–10.8)
WBC # FLD AUTO: 10.33 K/UL — SIGNIFICANT CHANGE UP (ref 4.8–10.8)

## 2025-01-14 PROCEDURE — 93325 DOPPLER ECHO COLOR FLOW MAPG: CPT | Mod: 26

## 2025-01-14 PROCEDURE — 93320 DOPPLER ECHO COMPLETE: CPT | Mod: 26

## 2025-01-14 PROCEDURE — 99233 SBSQ HOSP IP/OBS HIGH 50: CPT | Mod: 57,25

## 2025-01-14 PROCEDURE — 93656 COMPRE EP EVAL ABLTJ ATR FIB: CPT

## 2025-01-14 PROCEDURE — 93657 TX L/R ATRIAL FIB ADDL: CPT

## 2025-01-14 PROCEDURE — ZZZZZ: CPT

## 2025-01-14 PROCEDURE — 93312 ECHO TRANSESOPHAGEAL: CPT | Mod: 26

## 2025-01-14 RX ORDER — AMIODARONE HYDROCHLORIDE 200 MG/1
200 TABLET ORAL DAILY
Refills: 0 | Status: DISCONTINUED | OUTPATIENT
Start: 2025-01-14 | End: 2025-01-16

## 2025-01-14 RX ORDER — FAMOTIDINE 20 MG/1
20 TABLET, FILM COATED ORAL DAILY
Refills: 0 | Status: DISCONTINUED | OUTPATIENT
Start: 2025-01-15 | End: 2025-01-16

## 2025-01-14 RX ORDER — APIXABAN 5 MG/1
5 TABLET, FILM COATED ORAL EVERY 12 HOURS
Refills: 0 | Status: DISCONTINUED | OUTPATIENT
Start: 2025-01-14 | End: 2025-01-16

## 2025-01-14 RX ADMIN — Medication 17 GRAM(S): at 05:08

## 2025-01-14 RX ADMIN — CLOPIDOGREL BISULFATE 75 MILLIGRAM(S): 75 TABLET, FILM COATED ORAL at 14:42

## 2025-01-14 RX ADMIN — EZETIMIBE 10 MILLIGRAM(S): 10 TABLET ORAL at 14:42

## 2025-01-14 RX ADMIN — Medication 17 GRAM(S): at 18:08

## 2025-01-14 RX ADMIN — CHLORHEXIDINE GLUCONATE 1 APPLICATION(S): 1.2 RINSE ORAL at 05:08

## 2025-01-14 RX ADMIN — SENNOSIDES 2 TABLET(S): 8.6 TABLET, FILM COATED ORAL at 21:50

## 2025-01-14 RX ADMIN — ATORVASTATIN CALCIUM 40 MILLIGRAM(S): 40 TABLET, FILM COATED ORAL at 21:50

## 2025-01-14 RX ADMIN — INSULIN GLARGINE-YFGN 10 UNIT(S): 100 INJECTION, SOLUTION SUBCUTANEOUS at 21:50

## 2025-01-14 RX ADMIN — FENOFIBRATE 145 MILLIGRAM(S): 48 TABLET ORAL at 14:42

## 2025-01-14 RX ADMIN — QUETIAPINE FUMARATE 12.5 MILLIGRAM(S): 100 TABLET, FILM COATED ORAL at 21:50

## 2025-01-14 RX ADMIN — APIXABAN 5 MILLIGRAM(S): 5 TABLET, FILM COATED ORAL at 13:58

## 2025-01-14 NOTE — PROGRESS NOTE ADULT - NS ATTEND AMEND GEN_ALL_CORE FT
69yoM with iCM with LVEF 40-45% s/p CRT-D (2020), CAD with h/o PCI, porcine AVR, pAF (ablation in 4/2024, recently started on amiodarone for inappropriate shocks due to RVR therefore started on amiodarone), HTN, HLD, DM, COPD (not on home O2), and active tobacco use who was transferred from [facility] for Afib ablation, but found to be confused and with undifferentiated hypoxia.     Patient presented to [facility] on 12/26 but left AMA. He represented on 12/27 and was admitted for acute-on-chronic HFrEF. He was undergoing IV diuresis, but experienced flash pulmonary edema on 12/31 requiring intubation. Rhythm was Afib with RVR at the time. It is unclear if the rapid Afib caused pulmonary edema or if the patient developed worsening hypoxia which caused his HR to be elevated. While in the ICU, patient received Bumex 2 mg IV daily with robust UOP and prerenal KAREN. He was also treated with a 7-day course of abx for pneumonia. Patient self-extubated on 1/04 and was transitioned to EVAPS/BiPAP and eventually NC. Post extubation, he was confused and agitated requiring a Precedex gtt on 1/07 and 1/08.     Patient was transferred to [facility] on the evening on 1/09/25. On 1/10/25, his CXR showed bilateral fluffy opacities. T up to 99.2 and WBC above 20. His lung findings could have been aspiration pneumonitis in the setting of confusion, worsening pulmonary edema despite ongoing diuresis and RAP 3, and/or pneumonia. He has been diuresed overed the weekend and monitored off abx. CXR improving. Plan for ablation today.     Plan:   - NPO for ablation  - Seroquel 12.5 mg nightly for delirium  - On home Eliquis 5 mg BID, Plavix 75 mg daily, atorvastatin 40 mg nightly, Zetia 10 mg daily, and fenofibrate 145 mg daily - AM Eliquis held for ablation  - On amiodarone 200 mg daily and Lopressor 75 mg q6h for rate control - will adjust after ablation  - On Lantus 10 U at bedtime  - Plan to check IVC and give Lasix 40 mg IV if RAP > 3 mmHg

## 2025-01-14 NOTE — PROGRESS NOTE ADULT - SUBJECTIVE AND OBJECTIVE BOX
Chief complaint: Patient is a 69y old  Male who presents with a chief complaint of CHF exacerbation (11 Jan 2025 11:54)    HPI:  This is a 69-year-old male with past medical history of chronic HFrEF s/p ICD, AVR porcine, HTN, PAF s/p ablation, CAD s/p PCI, DM, HLD, COPD (no home O2), smoker presenting with worsening shortness of breath and weakness.  Patient was admitted within the last 24 hours for acute on chronic CHF exacerbation, was given IV Lasix and BiPAP, and left AMA.  Patient returns stating that he became more short of breath last night after leaving the hospital and would like to be readmitted for treatment.  Denies fever/chills, chest pain, abdominal pain, calf pain, N/V/D/C, cough, and nasal congestion.     [facility]/Tele/ICU  Course:  Patient admitted for acute on chronic systolic CHF vs Pneumonia. initial BNP 5K, leukocytosis, no fevers. CXR with bilateral lung opacities. Diuresed with Lasix 40mg, received IV ceftriaxone and azithromycin. TTE with EF 30-35%. On 12/31/24, a rapid response was called for tachycardia, chest pain, and hypoxia. Patient was noted to have multiple runs of Vtach on monitor. EKG with ventricular paced rhythm, O2 improved with supplemental oxygen, IV lasix and IV lopressor 5mg given, stabilized. Another rapid was called that same evening, with patient becoming more tachypneic, now hypoxic. CXR showed worsening fluid overload/flash pulmonary edema. Patient was becoming more somnolent and with increased labored breathing. [facility] discussions had with son Neto, patient remained full CODE. Decision was made to intubate patient and upgraded to ICU. Patient ultimately self extubated 1/4/2024 while doing spontaneous breathing trial. Remained off mechanical ventilation and was transitioned to bipap.  Finish course of cefepime 1/7/2025. After that monitor off abx. He was evaluated by EP who recommended transfer to [facility] for plan of ablation.     Interval history: Patient seen and examined at bedside. saw this AM, was alert and oriented to place and person, reported he did not sleep last night    1:1 sit remains in place  c/o hiccups   for ablation tomorrow      Review of systems: A complete 10-point review of systems was obtained and is negative except as stated in the interval history.    Vitals:  Vital Signs Last 24 Hrs  T(C): 36.6 (13 Jan 2025 15:10), Max: 37 (12 Jan 2025 23:24)  T(F): 97.8 (13 Jan 2025 15:10), Max: 98.6 (12 Jan 2025 23:24)  HR: 90 (13 Jan 2025 15:10) (90 - 113)  BP: 141/72 (13 Jan 2025 15:10) (125/96 - 150/70)  BP(mean): 99 (13 Jan 2025 15:10) (92 - 107)  RR: 20 (13 Jan 2025 15:10) (20 - 24)  SpO2: 93% (13 Jan 2025 15:10) (93% - 99%)    Parameters below as of 13 Jan 2025 08:14  Patient On (Oxygen Delivery Method): nasal cannula  O2 Flow (L/min): 2      Ins & outs:     01-09 @ 07:01  -  01-10 @ 07:00  --------------------------------------------------------  IN: 1279 mL / OUT: 1801 mL / NET: -522 mL    01-10 @ 07:01 - 01-11 @ 07:00  --------------------------------------------------------  IN: 52 mL / OUT: 1200 mL / NET: -1148 mL    01-11 @ 07:01 - 01-12 @ 07:00  --------------------------------------------------------  IN: 341 mL / OUT: 1100 mL / NET: -759 mL    01-12 @ 07:01 - 01-12 @ 11:08  --------------------------------------------------------  IN: 0 mL / OUT: 500 mL / NET: -500 mL      I&O's Summary    12 Jan 2025 07:01  -  13 Jan 2025 07:00  --------------------------------------------------------  IN: 0 mL / OUT: 1425 mL / NET: -1425 mL        Weight trend:      Physical exam:  General:  NAD  HEENT: Anicteric sclera. Moist mucous membranes.   Cardiac: Regular rate and rhythm. No murmurs, rubs, or gallops.   Vascular: Symmetric radial pulses. Dorsalis pedis pulses palpable.   Respiratory: Normal effort. Clear to ascultation.   Abdomen: Soft, nontender. Audible bowel sounds.   Extremities: Warm without edema. No cyanosis or clubbing.   Skin: Warm and dry. No rash.   Neurologic: Grossly normal motor function.   Psychiatric: Oriented to person, place, and time.     Data reviewed:  -  Telemetry: V paced nsr hr 80 bpm    - ECG 1/9th)   (Ventricular Rate 74 BPM    Atrial Rate 277 BPM    QRS Duration 154 ms    Q-T Interval 492 ms    QTC Calculation(Bazett) 546 ms    R Axis 246 degrees    T Axis 71 degrees    Diagnosis Line Ventricular-paced rhythm  Biventricular pacemaker detected  Abnormal ECG      - Echo (12/27)    1. Left ventricular ejection fraction, by visual estimation, is 40 to   45%.   2. Mildly enlarged left atrium.   3. Mild mitral annular calcification.   4. Mild mitral valve regurgitation.   5. Mild tricuspid regurgitation.   6. Bioprosthesis in the aortic position.   7. Ascending aorta 3.7 cm.   8. Estimated pulmonary artery systolic pressure is 35.7 mmHg assuming a   right atrial pressure of 3 mmHg, which is consistent with borderline   pulmonary hypertension.    - Radiology:    CXR:   Increased bilateral opacities/effusions.    - Labs:                        13.9   13.25 )-----------( 328      ( 13 Jan 2025 06:46 )             42.8     01-13    139  |  99  |  26[H]  ----------------------------<  181[H]  4.1   |  29  |  1.6[H]    Ca    9.5      13 Jan 2025 06:46  Mg     1.9     01-13                  Lactate Trend  01-10 @ 05:31 Lactate:1.5     Urinalysis Basic - ( 13 Jan 2025 06:46 )    Color: x / Appearance: x / SG: x / pH: x  Gluc: 181 mg/dL / Ketone: x  / Bili: x / Urobili: x   Blood: x / Protein: x / Nitrite: x   Leuk Esterase: x / RBC: x / WBC x   Sq Epi: x / Non Sq Epi: x / Bacteria: x      - Labs:  - Telemetry:  - ECG (date***):  - Echo (date***):  - Radiology:    - Labs:                        13.9   13.25 )-----------( 328      ( 13 Jan 2025 06:46 )             42.8     01-13    139  |  99  |  26[H]  ----------------------------<  181[H]  4.1   |  29  |  1.6[H]    Ca    9.5      13 Jan 2025 06:46  Mg     1.9     01-13                  Lactate Trend  01-10 @ 05:31 Lactate:1.5     Urinalysis Basic - ( 13 Jan 2025 06:46 )    Color: x / Appearance: x / SG: x / pH: x  Gluc: 181 mg/dL / Ketone: x  / Bili: x / Urobili: x   Blood: x / Protein: x / Nitrite: x   Leuk Esterase: x / RBC: x / WBC x   Sq Epi: x / Non Sq Epi: x / Bacteria: x                           Medications:  MEDICATIONS  (STANDING):  atorvastatin 40 milliGRAM(s) Oral at bedtime  chlorhexidine 2% Cloths 1 Application(s) Topical <User Schedule>  clopidogrel Tablet 75 milliGRAM(s) Oral daily  dextrose 5%. 1000 milliLiter(s) (50 mL/Hr) IV Continuous <Continuous>  dextrose 50% Injectable 25 Gram(s) IV Push once  dextrose 50% Injectable 12.5 Gram(s) IV Push once  dextrose 50% Injectable 25 Gram(s) IV Push once  ezetimibe 10 milliGRAM(s) Oral daily  fenofibrate Tablet 145 milliGRAM(s) Oral daily  glucagon  Injectable 1 milliGRAM(s) IntraMuscular once  insulin glargine Injectable (LANTUS) 10 Unit(s) SubCutaneous at bedtime  insulin lispro (ADMELOG) corrective regimen sliding scale   SubCutaneous three times a day before meals  metoprolol tartrate 75 milliGRAM(s) Oral every 6 hours  polyethylene glycol 3350 17 Gram(s) Oral two times a day  QUEtiapine 12.5 milliGRAM(s) Oral at bedtime  senna 2 Tablet(s) Oral at bedtime    MEDICATIONS  (PRN):  albuterol/ipratropium for Nebulization 3 milliLiter(s) Nebulizer every 6 hours PRN Shortness of Breath and/or Wheezing  dextrose Oral Gel 15 Gram(s) Oral once PRN Blood Glucose LESS THAN 70 milliGRAM(s)/deciliter  melatonin 3 milliGRAM(s) Oral at bedtime PRN Insomnia      PRN:     Allergies    sulfa drugs (Unknown)    Intolerances

## 2025-01-14 NOTE — PROGRESS NOTE ADULT - SUBJECTIVE AND OBJECTIVE BOX
Electrophysiology Brief Post-Op Note    I have personally seen and examined the patient.  I agree with the history and physical which I have reviewed and noted any changes below.  10-03-24 @ 8 AM    PRE-OP DIAGNOSIS: Persistent AFib    POST-OP DIAGNOSIS: Persistent AFib    PROCEDURE:   - KUSH   - PFA AFib Ablation (PVI, Post Wall) - 36 applications     VASCULAR ACCESS (with ultrasound guidance)   - Right femoral vein: 16.8 Fr   - Left femoral vein: 8 Fr, 11 Fr long   - Right femoral artery: 4 Fr    Physician: Cecilia Carrera MD  Assistant: None    ANESTHESIA TYPE:  [ X ] General Anesthesia  [  ] Sedation  [ X ] Local/Regional    ESTIMATED BLOOD LOSS:     20 mL    CONDITION  [  ] Critical  [  ] Serious  [  ]Fair  [ X ]Good    SPECIMENS REMOVED (IF APPLICABLE): N/A    IMPLANTS (IF APPLICABLE): N/A    FINDINGS: Persistent AFib     COMPLICATIONS: None    PLAN OF CARE  -          Monitor on telemetry  -          Check BMP in AM  -	Resume Eliquis 5 mg BID  -           Remove A line    -	Bedrest x 4 hours after removing A line (Perclose to groins)   -          Follow up with me in the office in 2-3 weeks

## 2025-01-14 NOTE — PROGRESS NOTE ADULT - ASSESSMENT
Assessment:  69-year-old male with past medical history of chronic HFrEF s/p ICD, AVR porcine, HTN, PAF s/p ablation, CAD s/p PCI, DM, HLD, COPD (no home O2), smoker presenting with worsening shortness of breath and weakness with concern for acute on chronic CHF exacerbation in the setting of persistent afib    Problems discussed and associated plan:    #Altered mental status   #toxic metabolic encephalopathy  While in [facility] , CT head negative   - received Seroquel 12.5mg last night, continue nightly   - initiate delirum precaution  - maintain constant observation   - fall precaution   -  obtain daily ECG to monitor qtc  -avoid further qtc prolonging agents  -  Speech and swallow: rec diet soft, bite size with thin liquids     # AFIB/aflutter  #Hx ICM s/p CRT-D  #Hx Bioprosthetic AV  - 01/14th  s/p DANGELO ablation  - c/w amiodarone 200mg daily   - 01/14th resume Eliquis post ablation  - Held metoprolol 75mg q 6hrs (as per EP for ablation) --> ask EP when to resume       #Acute Hypoxemic Respiratory Failure due to pulmonary edema vs ? resp. infection   #Hx COPD   - CXR with worsening effusion &  opacity, ProBNP [id number]   - continue to wean off O2   - Lasix 40mg IV x1 once and re-evaluate on daily basis  - BMP daily checks  - obtain RVP   - WBC down, procalcitonin 0.12,  hold off on Abx   >> appreciate ID recs>>- if WBC worsening tomorrow, can restart cefepime 2g q 12 hours per ID   - recheck procalcitonin a 2 days if concern for infection   - CXR: Stable bibasilar opacities. No Pneumothorax    #KAREN   - trend renal functions  - discontinue iyer. f/u TOV   - Avoid nephrotoxic meds   -Monitor I & O     # Transaminitis ( LFTS improving) .  - sp GI consult while in [facility]   likely due to CHF  congestive hepatopathy   -monitor LFTs  -acute hepatitis panel WNL  -avoid hepatotoxic medications  -optimize cardiac status    Hx CVA  - c/w Plavix     Hx DM   - F.S ACHS   - c/w ISS   - Lantus 10 units QHS     #HLD  - C/w zetia 10mg daily   - c/w fenofibrate 145mg po daily , lipitor 40mg daily     Please contact me with any questions or concerns at [phone].

## 2025-01-14 NOTE — PRE PROCEDURE NOTE - PRE PROCEDURE EVALUATION
I have personally seen and examined the patient. I agree with the history and physical//consult//progress note, which I have reviewed and noted any changes below.     Pre-op Diagnosis: AFib, CHF    Procedure: KUSH, AFib Ablation and All Related Procedures

## 2025-01-15 ENCOUNTER — RESULT REVIEW (OUTPATIENT)
Age: 70
End: 2025-01-15

## 2025-01-15 LAB
ANION GAP SERPL CALC-SCNC: 13 MMOL/L — SIGNIFICANT CHANGE UP (ref 7–14)
BUN SERPL-MCNC: 31 MG/DL — HIGH (ref 10–20)
CALCIUM SERPL-MCNC: 9.2 MG/DL — SIGNIFICANT CHANGE UP (ref 8.4–10.5)
CHLORIDE SERPL-SCNC: 98 MMOL/L — SIGNIFICANT CHANGE UP (ref 98–110)
CO2 SERPL-SCNC: 24 MMOL/L — SIGNIFICANT CHANGE UP (ref 17–32)
CREAT SERPL-MCNC: 1.6 MG/DL — HIGH (ref 0.7–1.5)
EGFR: 46 ML/MIN/1.73M2 — LOW
GLUCOSE BLDC GLUCOMTR-MCNC: 203 MG/DL — HIGH (ref 70–99)
GLUCOSE BLDC GLUCOMTR-MCNC: 225 MG/DL — HIGH (ref 70–99)
GLUCOSE BLDC GLUCOMTR-MCNC: 301 MG/DL — HIGH (ref 70–99)
GLUCOSE SERPL-MCNC: 194 MG/DL — HIGH (ref 70–99)
HCT VFR BLD CALC: 36 % — LOW (ref 42–52)
HGB BLD-MCNC: 11.6 G/DL — LOW (ref 14–18)
MAGNESIUM SERPL-MCNC: 1.9 MG/DL — SIGNIFICANT CHANGE UP (ref 1.8–2.4)
MCHC RBC-ENTMCNC: 28.9 PG — SIGNIFICANT CHANGE UP (ref 27–31)
MCHC RBC-ENTMCNC: 32.2 G/DL — SIGNIFICANT CHANGE UP (ref 32–37)
MCV RBC AUTO: 89.8 FL — SIGNIFICANT CHANGE UP (ref 80–94)
NRBC # BLD: 0 /100 WBCS — SIGNIFICANT CHANGE UP (ref 0–0)
PLATELET # BLD AUTO: 312 K/UL — SIGNIFICANT CHANGE UP (ref 130–400)
PMV BLD: 12.4 FL — HIGH (ref 7.4–10.4)
POTASSIUM SERPL-MCNC: 4.7 MMOL/L — SIGNIFICANT CHANGE UP (ref 3.5–5)
POTASSIUM SERPL-SCNC: 4.7 MMOL/L — SIGNIFICANT CHANGE UP (ref 3.5–5)
RBC # BLD: 4.01 M/UL — LOW (ref 4.7–6.1)
RBC # FLD: 14.3 % — SIGNIFICANT CHANGE UP (ref 11.5–14.5)
SODIUM SERPL-SCNC: 135 MMOL/L — SIGNIFICANT CHANGE UP (ref 135–146)
WBC # BLD: 13.41 K/UL — HIGH (ref 4.8–10.8)
WBC # FLD AUTO: 13.41 K/UL — HIGH (ref 4.8–10.8)

## 2025-01-15 PROCEDURE — 99233 SBSQ HOSP IP/OBS HIGH 50: CPT

## 2025-01-15 PROCEDURE — 93306 TTE W/DOPPLER COMPLETE: CPT | Mod: 26

## 2025-01-15 PROCEDURE — 99232 SBSQ HOSP IP/OBS MODERATE 35: CPT

## 2025-01-15 RX ORDER — METOPROLOL TARTRATE 50 MG
50 TABLET ORAL DAILY
Refills: 0 | Status: DISCONTINUED | OUTPATIENT
Start: 2025-01-15 | End: 2025-01-16

## 2025-01-15 RX ORDER — TORSEMIDE 10 MG/1
20 TABLET ORAL ONCE
Refills: 0 | Status: COMPLETED | OUTPATIENT
Start: 2025-01-15 | End: 2025-01-15

## 2025-01-15 RX ADMIN — Medication 17 GRAM(S): at 05:50

## 2025-01-15 RX ADMIN — TORSEMIDE 20 MILLIGRAM(S): 10 TABLET ORAL at 10:12

## 2025-01-15 RX ADMIN — Medication 2: at 08:15

## 2025-01-15 RX ADMIN — Medication 50 MILLIGRAM(S): at 18:03

## 2025-01-15 RX ADMIN — CLOPIDOGREL BISULFATE 75 MILLIGRAM(S): 75 TABLET, FILM COATED ORAL at 11:59

## 2025-01-15 RX ADMIN — EZETIMIBE 10 MILLIGRAM(S): 10 TABLET ORAL at 11:59

## 2025-01-15 RX ADMIN — APIXABAN 5 MILLIGRAM(S): 5 TABLET, FILM COATED ORAL at 18:03

## 2025-01-15 RX ADMIN — AMIODARONE HYDROCHLORIDE 200 MILLIGRAM(S): 200 TABLET ORAL at 05:51

## 2025-01-15 RX ADMIN — FENOFIBRATE 145 MILLIGRAM(S): 48 TABLET ORAL at 11:59

## 2025-01-15 RX ADMIN — Medication 4: at 11:59

## 2025-01-15 RX ADMIN — FAMOTIDINE 20 MILLIGRAM(S): 20 TABLET, FILM COATED ORAL at 11:59

## 2025-01-15 RX ADMIN — Medication 17 GRAM(S): at 18:04

## 2025-01-15 RX ADMIN — APIXABAN 5 MILLIGRAM(S): 5 TABLET, FILM COATED ORAL at 04:04

## 2025-01-15 RX ADMIN — Medication 2: at 18:03

## 2025-01-15 RX ADMIN — CHLORHEXIDINE GLUCONATE 1 APPLICATION(S): 1.2 RINSE ORAL at 05:49

## 2025-01-15 NOTE — PROGRESS NOTE ADULT - ASSESSMENT
69-year-old male with past medical history of chronic HFrEF s/p ICD, AVR porcine, HTN, PAF s/p ablation, CAD s/p PCI, DM, HLD, COPD (no home O2), smoker presenting with worsening shortness of breath and weakness with concern for acute on chronic CHF exacerbation in the setting of persistent afib    Impression:  AF sp Ablation  HFrEF sp AICD  Sev AS sp AVR  HTN  CAD sp PCI  DM  HLD    Plan:  - Continue Eliquis 5mg Q12h  - Cont Amio 200mg daily  - Start Pepcid 20mg daily for one month  - Cont all other home medications  - No heavy lifting or squatting for one week  - Follow up with Dr Carrera in one month

## 2025-01-15 NOTE — PROGRESS NOTE ADULT - SUBJECTIVE AND OBJECTIVE BOX
Chief complaint: Patient is a 69y old  Male who presents with a chief complaint of CHF exacerbation (14 Jan 2025 17:12)    HPI:  This is a 69-year-old male with past medical history of chronic HFrEF s/p ICD, AVR porcine, HTN, PAF s/p ablation, CAD s/p PCI, DM, HLD, COPD (no home O2), smoker presenting with worsening shortness of breath and weakness.  Patient was admitted within the last 24 hours for acute on chronic CHF exacerbation, was given IV Lasix and BiPAP, and left AMA.  Patient returns stating that he became more short of breath last night after leaving the hospital and would like to be readmitted for treatment.  Denies fever/chills, chest pain, abdominal pain, calf pain, N/V/D/C, cough, and nasal congestion.     [facility]/Tele/ICU  Course:  Patient admitted for acute on chronic systolic CHF vs Pneumonia. initial BNP 5K, leukocytosis, no fevers. CXR with bilateral lung opacities. Diuresed with Lasix 40mg, received IV ceftriaxone and azithromycin. TTE with EF 30-35%. On 12/31/24, a rapid response was called for tachycardia, chest pain, and hypoxia. Patient was noted to have multiple runs of Vtach on monitor. EKG with ventricular paced rhythm, O2 improved with supplemental oxygen, IV lasix and IV lopressor 5mg given, stabilized. Another rapid was called that same evening, with patient becoming more tachypneic, now hypoxic. CXR showed worsening fluid overload/flash pulmonary edema. Patient was becoming more somnolent and with increased labored breathing. [facility] discussions had with son Neto, patient remained full CODE. Decision was made to intubate patient and upgraded to ICU. Patient ultimately self extubated 1/4/2024 while doing spontaneous breathing trial. Remained off mechanical ventilation and was transitioned to bipap.  Finish course of cefepime 1/7/2025. After that monitor off abx. He was evaluated by EP who recommended transfer to [facility] for plan of ablation.     Interval history: Patient seen and examined at bedside this am.  Patient denies any acute complaints over the past 24 hours.  Patient AxO x 4 today.  1:1 observation discontinued.  Patient s/p afib ablation.      Review of systems: A complete 10-point review of systems was obtained and is negative except as stated in the interval history.    Vitals:  T(F): 98.6, Max: 98.6 (01-15 @ 07:47)  HR: 77 (64 - 88)  BP: 114/58 (93/45 - 132/63)  RR: 18 (15 - 24)  SpO2: 93% (93% - 100%)    Ins & outs:     01-12 @ 07:01  -  01-13 @ 07:00  --------------------------------------------------------  IN: 0 mL / OUT: 1425 mL / NET: -1425 mL    01-13 @ 07:01  -  01-14 @ 07:00  --------------------------------------------------------  IN: 120 mL / OUT: 0 mL / NET: 120 mL    01-14 @ 07:01  -  01-15 @ 07:00  --------------------------------------------------------  IN: 200 mL / OUT: 0 mL / NET: 200 mL      Weight trend:  Weight (kg): 82.1 (01-14)    Physical exam:  General: No apparent distress  HEENT: Anicteric sclera. Moist mucous membranes.    Cardiac: Regular rate and rhythm. No murmurs, rubs, or gallops.   Vascular: Symmetric radial pulses. Dorsalis pedis pulses palpable.   Respiratory: Normal effort. Clear to auscultation.   Abdomen: Soft, nontender. Audible bowel sounds.   Extremities: Warm with no edema. No cyanosis or clubbing.   Skin: Warm and dry. No rash.   Neurologic: Grossly normal motor function.   Psychiatric: Oriented to person, place, and time.     Data reviewed:  - Telemetry: atrial sensed ventricular paced at 80 bpm no events  - ECG (date 1/13/25): ventricular paced rhythm biventricular pacemaker detected at 97 bpm   - TTE (date 12/27/24):  EF 40-45%, mild mitral annular calcification, mild MR, mild TR, ascending aorta 3.7 cm  - Chest x-ray (date 1/13/24): stable bibasilar opacities. no pneumothorax     - Labs:                        11.6   13.41 )-----------( 312      ( 15 Dameon 2025 06:27 )             36.0     01-15    135  |  98  |  31[H]  ----------------------------<  194[H]  4.7   |  24  |  1.6[H]    Ca    9.2      15 Dameon 2025 06:27  Mg     1.9     01-15    TPro  5.7[L]  /  Alb  3.3[L]  /  TBili  0.6  /  DBili  0.2  /  AST  31  /  ALT  33  /  AlkPhos  68  01-14    PT/INR - ( 14 Jan 2025 05:40 )   PT: 23.00 sec;   INR: 1.92 ratio         PTT - ( 14 Jan 2025 05:40 )  PTT:38.1 sec            Urinalysis Basic - ( 15 Dameon 2025 06:27 )    Color: x / Appearance: x / SG: x / pH: x  Gluc: 194 mg/dL / Ketone: x  / Bili: x / Urobili: x   Blood: x / Protein: x / Nitrite: x   Leuk Esterase: x / RBC: x / WBC x   Sq Epi: x / Non Sq Epi: x / Bacteria: x        Medications:  aMIOdarone    Tablet 200 milliGRAM(s) Oral daily  apixaban 5 milliGRAM(s) Oral every 12 hours  atorvastatin 40 milliGRAM(s) Oral at bedtime  chlorhexidine 2% Cloths 1 Application(s) Topical <User Schedule>  clopidogrel Tablet 75 milliGRAM(s) Oral daily  dextrose 50% Injectable 25 Gram(s) IV Push once  dextrose 50% Injectable 12.5 Gram(s) IV Push once  dextrose 50% Injectable 25 Gram(s) IV Push once  ezetimibe 10 milliGRAM(s) Oral daily  famotidine    Tablet 20 milliGRAM(s) Oral daily  fenofibrate Tablet 145 milliGRAM(s) Oral daily  glucagon  Injectable 1 milliGRAM(s) IntraMuscular once  insulin glargine Injectable (LANTUS) 10 Unit(s) SubCutaneous at bedtime  insulin lispro (ADMELOG) corrective regimen sliding scale   SubCutaneous at bedtime  insulin lispro (ADMELOG) corrective regimen sliding scale   SubCutaneous three times a day before meals  metoprolol succinate ER 50 milliGRAM(s) Oral daily  polyethylene glycol 3350 17 Gram(s) Oral two times a day  QUEtiapine 12.5 milliGRAM(s) Oral at bedtime  senna 2 Tablet(s) Oral at bedtime    Drips:  dextrose 5%. 1000 milliLiter(s) (50 mL/Hr) IV Continuous <Continuous>    PRN:     Allergies    sulfa drugs (Unknown)    Intolerances

## 2025-01-15 NOTE — PROGRESS NOTE ADULT - SUBJECTIVE AND OBJECTIVE BOX
INTERVAL HPI/OVERNIGHT EVENTS:  No acute events overnight  Patient S/P AF Ablation POD#1  HD Stable    MEDICATIONS  (STANDING):  aMIOdarone    Tablet 200 milliGRAM(s) Oral daily  apixaban 5 milliGRAM(s) Oral every 12 hours  atorvastatin 40 milliGRAM(s) Oral at bedtime  chlorhexidine 2% Cloths 1 Application(s) Topical <User Schedule>  clopidogrel Tablet 75 milliGRAM(s) Oral daily  dextrose 5%. 1000 milliLiter(s) (50 mL/Hr) IV Continuous <Continuous>  dextrose 50% Injectable 25 Gram(s) IV Push once  dextrose 50% Injectable 12.5 Gram(s) IV Push once  dextrose 50% Injectable 25 Gram(s) IV Push once  ezetimibe 10 milliGRAM(s) Oral daily  famotidine    Tablet 20 milliGRAM(s) Oral daily  fenofibrate Tablet 145 milliGRAM(s) Oral daily  glucagon  Injectable 1 milliGRAM(s) IntraMuscular once  insulin glargine Injectable (LANTUS) 10 Unit(s) SubCutaneous at bedtime  insulin lispro (ADMELOG) corrective regimen sliding scale   SubCutaneous at bedtime  insulin lispro (ADMELOG) corrective regimen sliding scale   SubCutaneous three times a day before meals  metoprolol succinate ER 50 milliGRAM(s) Oral daily  polyethylene glycol 3350 17 Gram(s) Oral two times a day  QUEtiapine 12.5 milliGRAM(s) Oral at bedtime  senna 2 Tablet(s) Oral at bedtime    MEDICATIONS  (PRN):  albuterol/ipratropium for Nebulization 3 milliLiter(s) Nebulizer every 6 hours PRN Shortness of Breath and/or Wheezing  dextrose Oral Gel 15 Gram(s) Oral once PRN Blood Glucose LESS THAN 70 milliGRAM(s)/deciliter  melatonin 3 milliGRAM(s) Oral at bedtime PRN Insomnia      Allergies    sulfa drugs (Unknown)    Intolerances        REVIEW OF SYSTEMS: No CP, palpitations, dizziness or SOB     Vital Signs Last 24 Hrs  T(C): 37 (15 Dameon 2025 07:47), Max: 37 (15 Dameon 2025 07:47)  T(F): 98.6 (15 Dameon 2025 07:47), Max: 98.6 (15 Dameon 2025 07:47)  HR: 77 (15 Dameon 2025 07:47) (64 - 88)  BP: 114/58 (15 Dameon 2025 07:47) (93/45 - 132/63)  BP(mean): 78 (15 Dameon 2025 07:47) (68 - 92)  RR: 18 (15 Dameon 2025 07:47) (15 - 24)  SpO2: 93% (15 Dameon 2025 07:47) (93% - 100%)    Parameters below as of 15 Dameon 2025 05:19  Patient On (Oxygen Delivery Method): room air        01-14-25 @ 07:01  -  01-15-25 @ 07:00  --------------------------------------------------------  IN: 200 mL / OUT: 0 mL / NET: 200 mL      Physical Exam  GENERAL: In no apparent distress, well nourished, and hydrated.  EYES: EOMI, PERRLA, conjunctiva and sclera clear  NECK: Supple  HEART: Regular rate and rhythm; No murmurs, rubs, or gallops.  PULMONARY: Clear to auscultation and perfusion.  No rales, wheezing, or rhonchi bilaterally.  EXTREMITIES:  2+ Peripheral Pulses, No clubbing, cyanosis, or edema  SKIN: Groins healing well BL  NEUROLOGICAL: Grossly nonfocal      LABS:                        11.6   13.41 )-----------( 312      ( 15 Dameon 2025 06:27 )             36.0     01-15    135  |  98  |  31[H]  ----------------------------<  194[H]  4.7   |  24  |  1.6[H]    Ca    9.2      15 Dameon 2025 06:27  Mg     1.9     01-15    TPro  5.7[L]  /  Alb  3.3[L]  /  TBili  0.6  /  DBili  0.2  /  AST  31  /  ALT  33  /  AlkPhos  68  01-14    PT/INR - ( 14 Jan 2025 05:40 )   PT: 23.00 sec;   INR: 1.92 ratio         PTT - ( 14 Jan 2025 05:40 )  PTT:38.1 sec  Urinalysis Basic - ( 15 Dameon 2025 06:27 )    Color: x / Appearance: x / SG: x / pH: x  Gluc: 194 mg/dL / Ketone: x  / Bili: x / Urobili: x   Blood: x / Protein: x / Nitrite: x   Leuk Esterase: x / RBC: x / WBC x   Sq Epi: x / Non Sq Epi: x / Bacteria: x        RADIOLOGY & ADDITIONAL TESTS:

## 2025-01-15 NOTE — PROGRESS NOTE ADULT - TIME BILLING
I have personally seen and examined this patient.    I have reviewed all pertinent clinical information and reviewed all relevant imaging and diagnostic studies personally.   I discussed recommendations with the primary team.
AF, CHF
AF, CHF
Chart, telemetry, imaging review, discussion with team, diagnosis: Acute CHF, persistent AF, ICM    Maame Glass MD  Cardiac Electrophysiology
Review of all data  Medication adjustment  Care coordination with EP  Eval and exam  Patient discussion and education
direct pt care
AF, CHF
Review of chart  Contacting prior team  Care coordination with EP  Attempts to evaluate patient
AF
AF

## 2025-01-15 NOTE — PROGRESS NOTE ADULT - NS ATTEND AMEND GEN_ALL_CORE FT
s/p AF ablation   Groins healing well  Currently in NSR  Cont Amio as long as QTc is acceptable  Follow up in 1 mo

## 2025-01-15 NOTE — PROGRESS NOTE ADULT - NS ATTEND OPT1 GEN_ALL_CORE

## 2025-01-15 NOTE — PROGRESS NOTE ADULT - NS ATTEND AMEND GEN_ALL_CORE FT
69yoM with iCM with LVEF 40-45% s/p CRT-D (2020), CAD with h/o PCI, porcine AVR, pAF (ablation in 4/2024, recently started on amiodarone for inappropriate shocks due to RVR therefore started on amiodarone), HTN, HLD, DM, COPD (not on home O2), and active tobacco use who was transferred from [facility] for Afib ablation, but found to be confused and with undifferentiated hypoxia.     Patient presented to [facility] on 12/26 but left AMA. He represented on 12/27 and was admitted for acute-on-chronic HFrEF. He was undergoing IV diuresis, but experienced flash pulmonary edema on 12/31 requiring intubation. Rhythm was Afib with RVR at the time. It is unclear if the rapid Afib caused pulmonary edema or if the patient developed worsening hypoxia which caused his HR to be elevated. While in the ICU, patient received Bumex 2 mg IV daily with robust UOP and prerenal KAREN. He was also treated with a 7-day course of abx for pneumonia. Patient self-extubated on 1/04 and was transitioned to EVAPS/BiPAP and eventually NC. Post extubation, he was confused and agitated requiring a Precedex gtt on 1/07 and 1/08.     Patient was transferred to [facility] on the evening on 1/09/25. On 1/10/25, his CXR showed bilateral fluffy opacities. T up to 99.2 and WBC above 20. His lung findings could have been aspiration pneumonitis in the setting of confusion, worsening pulmonary edema despite ongoing diuresis and RAP 3, and/or pneumonia. He was been diuresed and his CXR improved without abx. Patient underwent Afib ablation on 1/14/25. Today, he is awake and alert.     Plan:   - Seroquel 12.5 mg nightly for delirium  - On home Eliquis 5 mg BID, Plavix 75 mg daily, atorvastatin 40 mg nightly, Zetia 10 mg daily, and fenofibrate 145 mg daily   - Continue amiodarone 200 mg daily   - High dose Lopressor discontinued after ablation, will start Toprol 50 mg daily for HFrEF  - Will give torsemide 20 mg PO x 1 today and monitor UOP to determine home dose of diuretic  - On Lantus 10 U at bedtime  - TTE 1/15/24: LVEF 43% with global hypokinesis, mildly increased LV wall thickness, RV size is mildly enlarged with normal RV function, moderate LAE, normally functioning bioprosthetic AVR, mild MR, mild TR, mild pHTN with RAP 8 mmHg  - PT consulted  - Trial of void

## 2025-01-15 NOTE — CHART NOTE - NSCHARTNOTEFT_GEN_A_CORE
Rolling Walker:  Patient requires rolling walker due to Heart failure with reduced ejection fraction for safe ambulation at discharge.

## 2025-01-15 NOTE — CHART NOTE - NSCHARTNOTESELECT_GEN_ALL_CORE
ICU/Event Note
Rolling walker letter of neccesity/Event Note
Transfer Note
Electrophysiology PA
Event Note
Event Note
Nutrition Services
Self extubation/Event Note

## 2025-01-15 NOTE — PROGRESS NOTE ADULT - ASSESSMENT
Assessment: 69-year-old male with past medical history of chronic HFrEF s/p ICD, AVR porcine, HTN, PAF s/p ablation, CAD s/p PCI, DM, HLD, COPD (no home O2), smoker presenting with worsening shortness of breath and weakness with concern for acute on chronic CHF exacerbation in the setting of persistent afib    Problems discussed and associated plan:  #Altered mental status   #toxic metabolic encephalopathy  While in [facility] , CT head negative   - received Seroquel 12.5mg last night, continue nightly   - initiate delirum precaution  - discontinue constant observation   - fall precaution   -  obtain daily ECG to monitor qtc  -avoid further qtc prolonging agents  -  Speech and swallow: rec diet soft, bite size with thin liquids     # AFIB/aflutter  #Hx ICM s/p CRT-D  #Hx Bioprosthetic AV  - 01/14th  s/p DANGELO ablation  - c/w amiodarone 200mg daily   - 01/14th resume Eliquis post ablation  - start toprol XL 50mg daily today       #Acute Hypoxemic Respiratory Failure due to pulmonary edema vs ? resp. infection   #Hx COPD   - CXR with worsening effusion &  opacity, ProBNP [id number]   - continue to wean off O2   - start torsemide 20mg daily today/    - BMP daily checks  - obtain RVP   - WBC down, procalcitonin 0.12,  hold off on Abx   >> appreciate ID recs>>- if WBC worsening tomorrow, can restart cefepime 2g q 12 hours per ID   - recheck procalcitonin a 2 days if concern for infection   - CXR: Stable bibasilar opacities. No Pneumothorax    #KAREN   - trend renal functions  - discontinue iyer. f/u TOV   - Avoid nephrotoxic meds   -Monitor I & O     # Transaminitis ( LFTS improving) .  - sp GI consult while in [facility]   likely due to CHF  congestive hepatopathy   -monitor LFTs  -acute hepatitis panel WNL  -avoid hepatotoxic medications  -optimize cardiac status    Hx CVA  - c/w Plavix     Hx DM   - F.S ACHS   - c/w ISS   - Lantus 10 units QHS     #HLD  - C/w zetia 10mg daily   - c/w fenofibrate 145mg po daily , lipitor 40mg daily     #deconditioning: consult PT for evaluation    Please contact me with any questions or concerns at [phone].

## 2025-01-16 ENCOUNTER — TRANSCRIPTION ENCOUNTER (OUTPATIENT)
Age: 70
End: 2025-01-16

## 2025-01-16 ENCOUNTER — INPATIENT (INPATIENT)
Facility: HOSPITAL | Age: 70
LOS: 2 days | Discharge: HOME HEALTH | DRG: 951 | End: 2025-01-19
Attending: HOSPITALIST | Admitting: INTERNAL MEDICINE
Payer: MEDICARE

## 2025-01-16 VITALS
OXYGEN SATURATION: 92 % | RESPIRATION RATE: 18 BRPM | SYSTOLIC BLOOD PRESSURE: 140 MMHG | HEART RATE: 72 BPM | TEMPERATURE: 97 F | DIASTOLIC BLOOD PRESSURE: 65 MMHG

## 2025-01-16 VITALS
HEART RATE: 80 BPM | HEIGHT: 63 IN | OXYGEN SATURATION: 97 % | SYSTOLIC BLOOD PRESSURE: 131 MMHG | RESPIRATION RATE: 18 BRPM | TEMPERATURE: 95 F | DIASTOLIC BLOOD PRESSURE: 54 MMHG

## 2025-01-16 DIAGNOSIS — Z95.5 PRESENCE OF CORONARY ANGIOPLASTY IMPLANT AND GRAFT: Chronic | ICD-10-CM

## 2025-01-16 DIAGNOSIS — Z98.890 OTHER SPECIFIED POSTPROCEDURAL STATES: Chronic | ICD-10-CM

## 2025-01-16 DIAGNOSIS — Z95.2 PRESENCE OF PROSTHETIC HEART VALVE: Chronic | ICD-10-CM

## 2025-01-16 DIAGNOSIS — R26.2 DIFFICULTY IN WALKING, NOT ELSEWHERE CLASSIFIED: ICD-10-CM

## 2025-01-16 LAB
GLUCOSE BLDC GLUCOMTR-MCNC: 240 MG/DL — HIGH (ref 70–99)
GLUCOSE BLDC GLUCOMTR-MCNC: 257 MG/DL — HIGH (ref 70–99)

## 2025-01-16 PROCEDURE — 99222 1ST HOSP IP/OBS MODERATE 55: CPT

## 2025-01-16 PROCEDURE — 82962 GLUCOSE BLOOD TEST: CPT

## 2025-01-16 PROCEDURE — 73562 X-RAY EXAM OF KNEE 3: CPT | Mod: 26,50

## 2025-01-16 PROCEDURE — 99285 EMERGENCY DEPT VISIT HI MDM: CPT

## 2025-01-16 PROCEDURE — 71045 X-RAY EXAM CHEST 1 VIEW: CPT | Mod: 26

## 2025-01-16 PROCEDURE — 97162 PT EVAL MOD COMPLEX 30 MIN: CPT | Mod: GP

## 2025-01-16 PROCEDURE — 97165 OT EVAL LOW COMPLEX 30 MIN: CPT | Mod: GO

## 2025-01-16 PROCEDURE — 99239 HOSP IP/OBS DSCHRG MGMT >30: CPT

## 2025-01-16 PROCEDURE — 72170 X-RAY EXAM OF PELVIS: CPT | Mod: 26

## 2025-01-16 RX ORDER — TORSEMIDE 20 MG/1
20 TABLET ORAL DAILY
Refills: 0 | Status: DISCONTINUED | OUTPATIENT
Start: 2025-01-16 | End: 2025-01-19

## 2025-01-16 RX ORDER — ACETAMINOPHEN 160 MG/5ML
650 SUSPENSION ORAL EVERY 6 HOURS
Refills: 0 | Status: DISCONTINUED | OUTPATIENT
Start: 2025-01-16 | End: 2025-01-19

## 2025-01-16 RX ORDER — APIXABAN 5 MG/1
1 TABLET, FILM COATED ORAL
Qty: 60 | Refills: 0
Start: 2025-01-16 | End: 2025-02-14

## 2025-01-16 RX ORDER — APIXABAN 5 MG/1
5 TABLET, FILM COATED ORAL
Refills: 0 | Status: DISCONTINUED | OUTPATIENT
Start: 2025-01-16 | End: 2025-01-19

## 2025-01-16 RX ORDER — SACUBITRIL AND VALSARTAN 49; 51 MG/1; MG/1
1 TABLET, FILM COATED ORAL
Refills: 0 | Status: DISCONTINUED | OUTPATIENT
Start: 2025-01-16 | End: 2025-01-19

## 2025-01-16 RX ORDER — AMIODARONE HYDROCHLORIDE 200 MG/1
1 TABLET ORAL
Qty: 30 | Refills: 0
Start: 2025-01-16 | End: 2025-02-14

## 2025-01-16 RX ORDER — FENOFIBRATE 145 MG/1
145 TABLET ORAL DAILY
Refills: 0 | Status: DISCONTINUED | OUTPATIENT
Start: 2025-01-16 | End: 2025-01-17

## 2025-01-16 RX ORDER — METOPROLOL SUCCINATE 25 MG
50 TABLET, EXTENDED RELEASE 24 HR ORAL DAILY
Refills: 0 | Status: DISCONTINUED | OUTPATIENT
Start: 2025-01-16 | End: 2025-01-19

## 2025-01-16 RX ORDER — SODIUM CHLORIDE 9 G/ML
1000 INJECTION, SOLUTION INTRAVENOUS
Refills: 0 | Status: DISCONTINUED | OUTPATIENT
Start: 2025-01-16 | End: 2025-01-19

## 2025-01-16 RX ORDER — FAMOTIDINE 10 MG/ML
20 INJECTION INTRAVENOUS DAILY
Refills: 0 | Status: DISCONTINUED | OUTPATIENT
Start: 2025-01-16 | End: 2025-01-17

## 2025-01-16 RX ORDER — ATORVASTATIN CALCIUM 80 MG/1
40 TABLET, FILM COATED ORAL AT BEDTIME
Refills: 0 | Status: DISCONTINUED | OUTPATIENT
Start: 2025-01-16 | End: 2025-01-19

## 2025-01-16 RX ORDER — MAGNESIUM, ALUMINUM HYDROXIDE 200-225/5
30 SUSPENSION, ORAL (FINAL DOSE FORM) ORAL EVERY 4 HOURS
Refills: 0 | Status: DISCONTINUED | OUTPATIENT
Start: 2025-01-16 | End: 2025-01-19

## 2025-01-16 RX ORDER — INSULIN LISPRO 100/ML
VIAL (ML) SUBCUTANEOUS
Refills: 0 | Status: DISCONTINUED | OUTPATIENT
Start: 2025-01-16 | End: 2025-01-19

## 2025-01-16 RX ORDER — EZETIMIBE 10 MG
10 TABLET ORAL DAILY
Refills: 0 | Status: DISCONTINUED | OUTPATIENT
Start: 2025-01-16 | End: 2025-01-19

## 2025-01-16 RX ORDER — METOPROLOL TARTRATE 50 MG
1 TABLET ORAL
Qty: 30 | Refills: 0
Start: 2025-01-16 | End: 2025-02-14

## 2025-01-16 RX ORDER — INSULIN LISPRO 100/ML
VIAL (ML) SUBCUTANEOUS AT BEDTIME
Refills: 0 | Status: DISCONTINUED | OUTPATIENT
Start: 2025-01-16 | End: 2025-01-19

## 2025-01-16 RX ORDER — TORSEMIDE 10 MG/1
1 TABLET ORAL
Qty: 30 | Refills: 0
Start: 2025-01-16 | End: 2025-02-14

## 2025-01-16 RX ORDER — ANTISEPTIC SURGICAL SCRUB 0.04 MG/ML
1 SOLUTION TOPICAL
Refills: 0 | Status: DISCONTINUED | OUTPATIENT
Start: 2025-01-16 | End: 2025-01-19

## 2025-01-16 RX ORDER — SACUBITRIL AND VALSARTAN 49; 51 MG/1; MG/1
1 TABLET, FILM COATED ORAL
Refills: 0 | Status: DISCONTINUED | OUTPATIENT
Start: 2025-01-16 | End: 2025-01-16

## 2025-01-16 RX ORDER — DM/PSEUDOEPHED/ACETAMINOPHEN 10-30-250
25 CAPSULE ORAL ONCE
Refills: 0 | Status: DISCONTINUED | OUTPATIENT
Start: 2025-01-16 | End: 2025-01-19

## 2025-01-16 RX ORDER — DM/PSEUDOEPHED/ACETAMINOPHEN 10-30-250
12.5 CAPSULE ORAL ONCE
Refills: 0 | Status: DISCONTINUED | OUTPATIENT
Start: 2025-01-16 | End: 2025-01-19

## 2025-01-16 RX ORDER — AMIODARONE HYDROCHLORIDE 50 MG/ML
200 INJECTION, SOLUTION INTRAVENOUS DAILY
Refills: 0 | Status: DISCONTINUED | OUTPATIENT
Start: 2025-01-16 | End: 2025-01-19

## 2025-01-16 RX ORDER — GLUCAGON 3 MG/1
1 POWDER NASAL ONCE
Refills: 0 | Status: DISCONTINUED | OUTPATIENT
Start: 2025-01-16 | End: 2025-01-19

## 2025-01-16 RX ORDER — FAMOTIDINE 20 MG/1
1 TABLET, FILM COATED ORAL
Qty: 30 | Refills: 0
Start: 2025-01-16 | End: 2025-02-14

## 2025-01-16 RX ORDER — ACETAMINOPHEN, DIPHENHYDRAMINE HCL, PHENYLEPHRINE HCL 325; 25; 5 MG/1; MG/1; MG/1
3 TABLET ORAL AT BEDTIME
Refills: 0 | Status: DISCONTINUED | OUTPATIENT
Start: 2025-01-16 | End: 2025-01-19

## 2025-01-16 RX ORDER — SACUBITRIL AND VALSARTAN 24; 26 MG/1; MG/1
1 TABLET, FILM COATED ORAL
Qty: 60 | Refills: 0
Start: 2025-01-16 | End: 2025-02-14

## 2025-01-16 RX ORDER — SACUBITRIL AND VALSARTAN 24; 26 MG/1; MG/1
1 TABLET, FILM COATED ORAL
Refills: 0 | Status: DISCONTINUED | OUTPATIENT
Start: 2025-01-16 | End: 2025-01-16

## 2025-01-16 RX ORDER — TORSEMIDE 10 MG/1
20 TABLET ORAL DAILY
Refills: 0 | Status: DISCONTINUED | OUTPATIENT
Start: 2025-01-16 | End: 2025-01-16

## 2025-01-16 RX ORDER — DM/PSEUDOEPHED/ACETAMINOPHEN 10-30-250
15 CAPSULE ORAL ONCE
Refills: 0 | Status: DISCONTINUED | OUTPATIENT
Start: 2025-01-16 | End: 2025-01-19

## 2025-01-16 RX ADMIN — AMIODARONE HYDROCHLORIDE 200 MILLIGRAM(S): 200 TABLET ORAL at 08:08

## 2025-01-16 RX ADMIN — SACUBITRIL AND VALSARTAN 1 TABLET(S): 24; 26 TABLET, FILM COATED ORAL at 11:43

## 2025-01-16 RX ADMIN — Medication 50 MILLIGRAM(S): at 08:08

## 2025-01-16 RX ADMIN — FAMOTIDINE 20 MILLIGRAM(S): 20 TABLET, FILM COATED ORAL at 11:43

## 2025-01-16 RX ADMIN — FENOFIBRATE 145 MILLIGRAM(S): 48 TABLET ORAL at 11:43

## 2025-01-16 RX ADMIN — CLOPIDOGREL BISULFATE 75 MILLIGRAM(S): 75 TABLET, FILM COATED ORAL at 11:44

## 2025-01-16 RX ADMIN — APIXABAN 5 MILLIGRAM(S): 5 TABLET, FILM COATED ORAL at 08:08

## 2025-01-16 RX ADMIN — Medication 2: at 16:59

## 2025-01-16 RX ADMIN — EZETIMIBE 10 MILLIGRAM(S): 10 TABLET ORAL at 11:43

## 2025-01-16 RX ADMIN — Medication 3: at 11:46

## 2025-01-16 RX ADMIN — TORSEMIDE 20 MILLIGRAM(S): 10 TABLET ORAL at 11:44

## 2025-01-16 NOTE — PROGRESS NOTE ADULT - ASSESSMENT
Assessment: 69-year-old male with past medical history of chronic HFrEF s/p ICD, AVR porcine, HTN, PAF s/p ablation, CAD s/p PCI, DM, HLD, COPD (no home O2), smoker presenting with worsening shortness of breath and weakness with concern for acute on chronic CHF exacerbation in the setting of persistent afib    Problems discussed and associated plan:  #Altered mental status   #toxic metabolic encephalopathy  While in [facility] , CT head negative   - received Seroquel 12.5mg last night, continue nightly   - initiate delirum precaution  - discontinue constant observation   - fall precaution   -  obtain daily ECG to monitor qtc  -avoid further qtc prolonging agents  -  Speech and swallow: rec diet soft, bite size with thin liquids     # AFIB/aflutter  #Hx ICM s/p CRT-D  #Hx Bioprosthetic AV  - 01/14th  s/p DANGELO ablation  - c/w amiodarone 200mg daily   - 01/14th resume Eliquis post ablation  - start toprol XL 50mg daily today       #Acute Hypoxemic Respiratory Failure due to pulmonary edema vs ? resp. infection   #Hx COPD   - CXR with worsening effusion &  opacity, ProBNP [id number]   - continue to wean off O2   - start torsemide 20mg daily today/    - BMP daily checks  - obtain RVP   - WBC down, procalcitonin 0.12,  hold off on Abx   >> appreciate ID recs>>- if WBC worsening tomorrow, can restart cefepime 2g q 12 hours per ID   - recheck procalcitonin a 2 days if concern for infection   - CXR: Stable bibasilar opacities. No Pneumothorax    #KAREN   - trend renal functions  - discontinue iyer. f/u TOV   - Avoid nephrotoxic meds   -Monitor I & O     # Transaminitis ( LFTS improving) .  - sp GI consult while in [facility]   likely due to CHF  congestive hepatopathy   -monitor LFTs  -acute hepatitis panel WNL  -avoid hepatotoxic medications  -optimize cardiac status    Hx CVA  - c/w Plavix     Hx DM   - F.S ACHS   - c/w ISS   - Lantus 10 units QHS     #HLD  - C/w zetia 10mg daily   - c/w fenofibrate 145mg po daily , lipitor 40mg daily     #deconditioning: consult PT for evaluation    Please contact me with any questions or concerns at [phone]. Assessment: 69-year-old male with past medical history of chronic HFrEF s/p ICD, AVR porcine, HTN, PAF s/p ablation, CAD s/p PCI, DM, HLD, COPD (no home O2), smoker presenting with worsening shortness of breath and weakness with concern for acute on chronic CHF exacerbation in the setting of persistent afib    Problems discussed and associated plan:    #Acute Hypoxemic Respiratory Failure due to pulmonary edema  ( resolved)   #ADHF   #Hx COPD   - on 1/10th CXR with worsening effusion &  opacity, ProBNP [id number]   - Infectious process ruled out. pt improved on IV diuresis - WBC downtrending procalcitonin 0.12,  hold off on Abx  - s/p IV torsemide 20mg, c/w with   torsemide 20mg po daily today/    - BMP daily checks   - Appreciate ID consults      # AFIB/aflutter  #Hx ICM s/p CRT-D  #Hx Bioprosthetic AV  - 01/14th  s/p DANGELO ablation  - c/w amiodarone 200mg daily   - 01/14th resume Eliquis post ablation  -c/w  toprol XL 50mg daily today       #KAREN   - trend renal functions  - discontinue iyer. passed TOV voiding well   - Avoid nephrotoxic meds   -Monitor I & O       #Altered mental status ( improved, now back to [facility] A/OX3)   #toxic metabolic encephalopathy  While in [facility] , CT head negative   - received Seroquel 12.5mg last night, continue nightly   - initiate delirum precaution  - discontinue constant observation   - fall precaution   -  obtain daily ECG to monitor qtc  -avoid further qtc prolonging agents  -  Speech and swallow: rec diet soft, bite size with thin liquids       # Transaminitis ( LFTS improving) .  - sp GI consult while in [facility]   likely due to CHF  congestive hepatopathy   -monitor LFTs  -acute hepatitis panel WNL  -avoid hepatotoxic medications  -optimize cardiac status    Hx CVA  - c/w Plavix     Hx DM   - F.S ACHS   - c/w ISS   - Lantus 10 units QHS while inpatient ( tresiba and trulicity at home )     #HLD  - C/w zetia 10mg daily   - c/w fenofibrate 145mg po daily , lipitor 40mg daily     #deconditioning: PT consulted who recommended D/C to Rehab     Please contact me with any questions or concerns at [phone].

## 2025-01-16 NOTE — PROGRESS NOTE ADULT - REASON FOR ADMISSION
CHF exacerbation

## 2025-01-16 NOTE — PROGRESS NOTE ADULT - PROVIDER SPECIALTY LIST ADULT
Cardiology
Critical Care
Electrophysiology
Hospitalist
Hospitalist
Infectious Disease
Internal Medicine
Cardiology
Critical Care
Critical Care
Electrophysiology
Hospitalist
Infectious Disease
Infectious Disease
Internal Medicine
Cardiology
Critical Care
Hospitalist
Infectious Disease
Nephrology
Pulmonology
Cardiology
Critical Care
Critical Care
Electrophysiology
Hospitalist
Nephrology
Pulmonology

## 2025-01-16 NOTE — ED PROVIDER NOTE - PROGRESS NOTE DETAILS
Patient refusing IV, is primarily being admitted for placement into facility given his generalized weakness and frequent falls.  Spoke with patient's son who is aware and is in agreement with this plan.  Patient signed a refusal form to not have an IV placed

## 2025-01-16 NOTE — H&P ADULT - ASSESSMENT
69-year-old male history of CHF, AICD, AVR, hypertension, paroxysmal A-fib status post ablation (1/14/25), CAD, diabetes, hyperlipidemia, COPD not on home O2 recently admitted for CHF exacerbation discharged last night returns today after having a witnessed fall due to generalized weakness.    #fall   # weakness   - admit to medicine  - XR - negative for acute fx   - pain control   - at current time pt is refusing IV and blood work, pt signed refusal of treatment form, placed in chart  - PT/OT  - social work     # h/o chronic systolic CHF  - c/w torsemide   - daily weights     # h/o Paroxysmal Afib (on Eliquis) s/p ablation 1/14   -C/w eliquis, amiodarone, toprol    # h/o CAD  -C/w eliquis, plavix, and lipitor    #DM  -ISS inpatient   -Hold metformin in hospital    #COPD (no home O2)    #Hyperlipidemia  -C/w tricor, zetia, lipitor    Diet: CC/DASH  VTE ppx: C/w Eliquis

## 2025-01-16 NOTE — H&P ADULT - NSHPLABSRESULTS_GEN_ALL_CORE
11.6   13.41 )-----------( 312      ( 15 Dameon 2025 06:27 )             36.0   Urinalysis Basic - ( 15 Dameon 2025 06:27 )    Color: x / Appearance: x / SG: x / pH: x  Gluc: 194 mg/dL / Ketone: x  / Bili: x / Urobili: x   Blood: x / Protein: x / Nitrite: x   Leuk Esterase: x / RBC: x / WBC x   Sq Epi: x / Non Sq Epi: x / Bacteria: x    01-15    135  |  98  |  31[H]  ----------------------------<  194[H]  4.7   |  24  |  1.6[H]    Ca    9.2      15 Dameon 2025 06:27  Mg     1.9     01-15 OPEN

## 2025-01-16 NOTE — DISCHARGE NOTE PROVIDER - HOSPITAL COURSE
69-year-old male with past medical history of chronic HFrEF s/p ICD, AVR porcine, HTN, PAF s/p ablation, CAD s/p PCI, DM, HLD, COPD (no home O2), smoker presenting with worsening shortness of breath and weakness.  Patient was admitted within the last 24 hours for acute on chronic CHF exacerbation, was given IV Lasix and BiPAP, and left AMA.  Patient returns stating that he became more short of breath last night after leaving the hospital and would like to be readmitted for treatment.  Denies fever/chills, chest pain, abdominal pain, calf pain, N/V/D/C, cough, and nasal congestion.     [facility] Med/Tele/ICU  Course:  Patient admitted for acute on chronic systolic CHF vs Pneumonia. initial BNP 5K, leukocytosis, no fevers. CXR with bilateral lung opacities. Diuresed with Lasix 40mg, received IV ceftriaxone and azithromycin. TTE with EF 30-35%. On 12/31/24, a rapid response was called for tachycardia, chest pain, and hypoxia. Patient was noted to have multiple runs of Vtach on monitor. EKG with ventricular paced rhythm, O2 improved with supplemental oxygen, IV lasix and IV lopressor 5mg given, stabilized. Another rapid was called that same evening, with patient becoming more tachypneic, now hypoxic. CXR showed worsening fluid overload/flash pulmonary edema. Patient was becoming more somnolent and with increased labored breathing. [facility] discussions had with son Neto, patient remained full CODE. Decision was made to intubate patient and upgraded to ICU. Patient ultimately self extubated 1/4/2024 while doing spontaneous breathing trial. Remained off mechanical ventilation and was transitioned to bipap.  Finish course of cefepime 1/7/2025. After that monitor off abx. He was evaluated by EP who recommended transfer to [facility] for plan of ablation.     Patient was transferred to [facility] on the evening on 1/09/25. On 1/10/25, his CXR showed bilateral fluffy opacities. T up to 99.2 and WBC above 20. His lung findings could have been aspiration pneumonitis in the setting of confusion, worsening pulmonary edema despite ongoing diuresis and RAP 3, and/or pneumonia. He was been diuresed and his CXR improved without abx. Patient was started on seroquel 12.5 mg nightly for delirium.   On (1/14/25)  patient underwent successful PFA afib ablation, Patient was monitored overnight. On POD 1 patient remains HD stable with no complaints. Patient remains in SR with no arrhythmias noted on tele. Examination of B/L common femoral venous access sites reveal a Clear and dry area with no hematoma or erythema. For PVI- Patient should continue Eliquis for thromboembolic prophylaxis. Patient started on PO torsemide, had a repeat TTE ( 1/15) revealing LVEF 43% with global hypokinesis, mildly increased LV wall thickness, RV size is mildly enlarged with normal RV function, moderate LAE, normally functioning bioprosthetic AVR, mild MR, mild TR, mild pHTN with RAP 8 mmHg  Patient was evaluated by PT and recommended ***       69-year-old male with past medical history of chronic HFrEF s/p ICD, AVR porcine, HTN, PAF s/p ablation, CAD s/p PCI, DM, HLD, COPD (no home O2), smoker presenting with worsening shortness of breath and weakness.  Patient was admitted within the last 24 hours for acute on chronic CHF exacerbation, was given IV Lasix and BiPAP, and left AMA.  Patient returns stating that he became more short of breath last night after leaving the hospital and would like to be readmitted for treatment.  Denies fever/chills, chest pain, abdominal pain, calf pain, N/V/D/C, cough, and nasal congestion.     [facility] Med/Tele/ICU  Course:  Patient admitted for acute on chronic systolic CHF vs Pneumonia. initial BNP 5K, leukocytosis, no fevers. CXR with bilateral lung opacities. Diuresed with Lasix 40mg, received IV ceftriaxone and azithromycin. TTE with EF 30-35%. On 12/31/24, a rapid response was called for tachycardia, chest pain, and hypoxia. Patient was noted to have multiple runs of Vtach on monitor. EKG with ventricular paced rhythm, O2 improved with supplemental oxygen, IV lasix and IV lopressor 5mg given, stabilized. Another rapid was called that same evening, with patient becoming more tachypneic, now hypoxic. CXR showed worsening fluid overload/flash pulmonary edema. Patient was becoming more somnolent and with increased labored breathing. [facility] discussions had with son Neto, patient remained full CODE. Decision was made to intubate patient and upgraded to ICU. Patient ultimately self extubated 1/4/2024 while doing spontaneous breathing trial. Remained off mechanical ventilation and was transitioned to bipap.  Finish course of cefepime 1/7/2025. After that monitor off abx. He was evaluated by EP who recommended transfer to [facility] for plan of ablation.     Patient was transferred to [facility] on the evening on 1/09/25. On 1/10/25, his CXR showed bilateral fluffy opacities. T up to 99.2 and WBC above 20. His lung findings could have been aspiration pneumonitis in the setting of confusion, worsening pulmonary edema despite ongoing diuresis and RAP 3, and/or pneumonia. He was been diuresed and his CXR improved without abx. Patient was started on seroquel 12.5 mg nightly for delirium.   On (1/14/25)  patient underwent successful PFA afib ablation, Patient was monitored overnight. On POD 1 patient remains HD stable with no complaints. Patient remains in SR with no arrhythmias noted on tele. Examination of B/L common femoral venous access sites reveal a Clear and dry area with no hematoma or erythema. For PVI- Patient should continue Eliquis for thromboembolic prophylaxis. Patient started on PO torsemide, had a repeat TTE ( 1/15) revealing LVEF 43% with global hypokinesis, mildly increased LV wall thickness, RV size is mildly enlarged with normal RV function, moderate LAE, normally functioning bioprosthetic AVR, mild MR, mild TR, mild pHTN with RAP 8 mmHg  Patient was evaluated by PT and recommended rehab. Patient declined rehab and discussed with son and spouse who are okay to assist patient at home  . Patient will be discharged home with home health service including PT. Patient will be discharged home on eliquis 5mg BID, palvix 75mg daily, amiodarone 200mg, torsemide 20mg daily, Toprol xl 50mg daily, entresto 24/26mg BID, zetia 10mg, fenofibrate 145mg.

## 2025-01-16 NOTE — ED PROVIDER NOTE - ATTENDING APP SHARED VISIT CONTRIBUTION OF CARE
69-year-old male, history of CHF, AICD, AVR, hypertension, paroxysmal A-fib s/p ablation (1/14/25), CAD, diabetes, hyperlipidemia, COPD not on home O2 recently admitted for CHF exacerbation, discharged last night, returns today after having a witnessed fall due to generalized weakness. Pt suffered mechanical fall from standing, witnessed by son,  No LOC, no head trauma. Patient sustained abrasions to bilateral shins. Patient was urged to follow-up with rehabilitation and refused placement at [facility] on last admission. Patient denies head trauma, LOC, neck pain, chest pain. Pt agrees to rehab placement this time. On exam, pt in NAD, AAOx3, head NC/AT, CN II-XII intact, PEERL, EOMi, neck (-) midline tenderness, lungs CTA B/L, CV S1S2 regular, abdomen soft/NT/ND/(+)BS, ext (+) abrasions to b/l shins, moving all extremities. Work up reviewed. Will admit for placement.

## 2025-01-16 NOTE — DISCHARGE NOTE PROVIDER - CARE PROVIDERS DIRECT ADDRESSES
,DirectAddress_Unknown,[email or web address]

## 2025-01-16 NOTE — H&P ADULT - NSHPPHYSICALEXAM_GEN_ALL_CORE
ICU Vital Signs Last 24 Hrs  T(C): 35 (16 Jan 2025 19:31), Max: 36.6 (15 Dameon 2025 23:38)  T(F): 95 (16 Jan 2025 19:31), Max: 97.9 (15 Dameon 2025 23:38)  HR: 80 (16 Jan 2025 19:31) (72 - 89)  BP: 131/54 (16 Jan 2025 19:31) (131/54 - 186/73)  BP(mean): 94 (16 Jan 2025 16:17) (94 - 105)  ABP: --  ABP(mean): --  RR: 18 (16 Jan 2025 19:31) (18 - 19)  SpO2: 97% (16 Jan 2025 19:31) (92% - 97%)    O2 Parameters below as of 16 Jan 2025 19:31  Patient On (Oxygen Delivery Method): room air      Constitutional: NAD, well-nourished, non toxic appearing   HEENT: Airway patent, moist MM, no erythema/swelling/deformity of oral structures. EOMI, PERRLA.  CV: regular rate, regular rhythm, well-perfused extremities, 2+ b/l DP and radial pulses equal.  Lungs: BCTA, no increased WOB.  ABD: NTND, no guarding or rebound, no pulsatile mass, no hernias.   MSK: Neck supple, nontender, nl ROM, no stepoff. Chest nontender. Back nontender in TLS spine or to b/l bony structures or flanks. Ext nontender, nl rom, no deformity.   INTEG: Skin warm, dry, no rash.  NEURO: A&Ox3, normal strength, nl sensation throughout, normal speech.   PSYCH: Denies SI/HI/hallucinations

## 2025-01-16 NOTE — DISCHARGE NOTE PROVIDER - ATTENDING DISCHARGE PHYSICAL EXAMINATION:
Regular rate and rhythm. No edema.     Patient is medically stable for discharge, but given that he has been hospitalized since 12/27/24, he is very deconditioned. He worked with physical therapy who recommended subacute rehab. Initially, patient was amenable to [facility] but he is now refusing. Case management spoke to the patient's son who says he can care for his father at home. We will ambulate the patient with the son at bedside before discharge home.

## 2025-01-16 NOTE — ED PROVIDER NOTE - OBJECTIVE STATEMENT
Patient is a 69-year-old male history of CHF, AICD, AVR, hypertension, paroxysmal A-fib status post ablation, CAD, diabetes, hyperlipidemia, COPD not on home O2 recently admitted for CHF exacerbation discharged last night returns today after having a witnessed fall due to generalized weakness.  Patient was urged to follow-up with rehabilitation for this weakness but did not go today and fell at home.  Patient sustained abrasions to bilateral shins.  Patient denies head trauma, LOC, neck pain, chest pain, abdominal pain.

## 2025-01-16 NOTE — H&P ADULT - HISTORY OF PRESENT ILLNESS
Patient is a 69-year-old male history of CHF, AICD, AVR, hypertension, paroxysmal A-fib status post ablation (1/14/25), CAD, diabetes, hyperlipidemia, COPD not on home O2 recently admitted for CHF exacerbation discharged last night returns today after having a witnessed fall due to generalized weakness. pt suffered mechanical fall from standing, witnessed by son,  No LOC, no head trauma. Patient sustained abrasions to bilateral shins. Patient was urged to follow-up with rehabilitation and refused placement at [facility] on last admissionPatient denies head trauma, LOC, neck pain, chest pain.  Patient is a 69-year-old male history of CHF, AICD, AVR, hypertension, paroxysmal A-fib status post ablation (1/14/25), CAD, diabetes, hyperlipidemia, COPD not on home O2 recently admitted for CHF exacerbation discharged last night returns today after having a witnessed fall due to generalized weakness. pt suffered mechanical fall from standing, witnessed by son,  No LOC, no head trauma. Patient sustained abrasions to bilateral shins. Patient was urged to follow-up with rehabilitation and refused placement at [facility] on last admission. Patient denies head trauma, LOC, neck pain, chest pain.

## 2025-01-16 NOTE — DISCHARGE NOTE PROVIDER - NSDCCPCAREPLAN_GEN_ALL_CORE_FT
PRINCIPAL DISCHARGE DIAGNOSIS  Diagnosis: CHF exacerbation  Assessment and Plan of Treatment: - start taking torsemide 20mg daily. If worsening shortness of breathe, leg swelliung or weight gain take 2 tablets of torsemide (40 mg ) and call your cardiologist      SECONDARY DISCHARGE DIAGNOSES  Diagnosis: Other persistent atrial fibrillation  Assessment and Plan of Treatment:

## 2025-01-16 NOTE — DISCHARGE NOTE PROVIDER - NSDCCPTREATMENT_GEN_ALL_CORE_FT
PRINCIPAL PROCEDURE  Procedure: Ablation, cardiac  Findings and Treatment:   - Please start taking famotidine 20 mg daily for 30 days.   - You should continue taking Eliquis  - Do not submerge in water (example: baths, swimming) for 1 week.  - No squatting, exertional activities, or lifting anything > 10 lbs for 1 week.  - Any sudden swelling, redness, fever, discharge, or severe pain, call the Electrophysiology Office at [phone].

## 2025-01-16 NOTE — PROGRESS NOTE ADULT - SUBJECTIVE AND OBJECTIVE BOX
Chief complaint: Patient is a 69y old  Male who presents with a chief complaint of CHF exacerbation (14 Jan 2025 17:12)    HPI:  This is a 69-year-old male with past medical history of chronic HFrEF s/p ICD, AVR porcine, HTN, PAF s/p ablation, CAD s/p PCI, DM, HLD, COPD (no home O2), smoker presenting with worsening shortness of breath and weakness.  Patient was admitted within the last 24 hours for acute on chronic CHF exacerbation, was given IV Lasix and BiPAP, and left AMA.  Patient returns stating that he became more short of breath last night after leaving the hospital and would like to be readmitted for treatment.  Denies fever/chills, chest pain, abdominal pain, calf pain, N/V/D/C, cough, and nasal congestion.     [facility]/Tele/ICU  Course:  Patient admitted for acute on chronic systolic CHF vs Pneumonia. initial BNP 5K, leukocytosis, no fevers. CXR with bilateral lung opacities. Diuresed with Lasix 40mg, received IV ceftriaxone and azithromycin. TTE with EF 30-35%. On 12/31/24, a rapid response was called for tachycardia, chest pain, and hypoxia. Patient was noted to have multiple runs of Vtach on monitor. EKG with ventricular paced rhythm, O2 improved with supplemental oxygen, IV lasix and IV lopressor 5mg given, stabilized. Another rapid was called that same evening, with patient becoming more tachypneic, now hypoxic. CXR showed worsening fluid overload/flash pulmonary edema. Patient was becoming more somnolent and with increased labored breathing. [facility] discussions had with son Neto, patient remained full CODE. Decision was made to intubate patient and upgraded to ICU. Patient ultimately self extubated 1/4/2024 while doing spontaneous breathing trial. Remained off mechanical ventilation and was transitioned to bipap.  Finish course of cefepime 1/7/2025. After that monitor off abx. He was evaluated by EP who recommended transfer to [facility] for plan of ablation.     Interval history: Patient seen and examined at bedside this am.  Patient denies any acute complaints over the past 24 hours.      Review of systems: A complete 10-point review of systems was obtained and is negative except as stated in the interval history.    Vitals:  T(C): 36.3 (16 Jan 2025 08:41), Max: 36.6 (15 Dameon 2025 23:38)  T(F): 97.3 (16 Jan 2025 08:41), Max: 97.9 (15 Dameon 2025 23:38)  HR: 89 (16 Jan 2025 08:41) (77 - 89)  BP: 167/72 (16 Jan 2025 08:41) (120/66 - 186/73)  BP(mean): 104 (16 Jan 2025 08:41) (88 - 105)    RR: 19 (16 Jan 2025 08:41) (18 - 19)  SpO2: 93% (16 Jan 2025 08:41) (93% - 97%)    O2 Parameters below as of 16 Jan 2025 08:41  Patient On (Oxygen Delivery Method): room air      Ins & outs:     I&O's Summary    15 Dameon 2025 07:01  -  16 Jan 2025 07:00  --------------------------------------------------------  IN: 0 mL / OUT: 550 mL / NET: -550 mL      01-12 @ 07:01 - 01-13 @ 07:00  --------------------------------------------------------  IN: 0 mL / OUT: 1425 mL / NET: -1425 mL    01-13 @ 07:01 - 01-14 @ 07:00  --------------------------------------------------------  IN: 120 mL / OUT: 0 mL / NET: 120 mL    01-14 @ 07:01 - 01-15 @ 07:00  --------------------------------------------------------  IN: 200 mL / OUT: 0 mL / NET: 200 mL      Weight trend:  Weight (kg): 82.1 (01-14)    Physical exam:  General: No apparent distress  HEENT: Anicteric sclera. Moist mucous membranes.    Cardiac: Regular rate and rhythm. No murmurs, rubs, or gallops.   Vascular: Symmetric radial pulses. Dorsalis pedis pulses palpable.   Respiratory: Normal effort. Clear to auscultation.   Abdomen: Soft, nontender. Audible bowel sounds.   Extremities: Warm with no edema. No cyanosis or clubbing.   Skin: Warm and dry. No rash.   Neurologic: Grossly normal motor function.   Psychiatric: Oriented to person, place, and time.     Data reviewed:  - Telemetry: atrial sensed ventricular paced at 80 bpm no events  - ECG (date 1/13/25): ventricular paced rhythm biventricular pacemaker detected at 97 bpm   - TTE (date 12/27/24):  EF 40-45%, mild mitral annular calcification, mild MR, mild TR, ascending aorta 3.7 cm  - Chest x-ray (date 1/13/24): stable bibasilar opacities. no pneumothorax       - Labs:                        11.6   13.41 )-----------( 312      ( 15 Dameon 2025 06:27 )             36.0     01-15    135  |  98  |  31[H]  ----------------------------<  194[H]  4.7   |  24  |  1.6[H]    Ca    9.2      15 Dameon 2025 06:27  Mg     1.9     01-15                  Lactate Trend    Urinalysis Basic - ( 15 Dameon 2025 06:27 )    Color: x / Appearance: x / SG: x / pH: x  Gluc: 194 mg/dL / Ketone: x  / Bili: x / Urobili: x   Blood: x / Protein: x / Nitrite: x   Leuk Esterase: x / RBC: x / WBC x   Sq Epi: x / Non Sq Epi: x / Bacteria: x              Urinalysis Basic - ( 15 Dameon 2025 06:27 )    Color: x / Appearance: x / SG: x / pH: x  Gluc: 194 mg/dL / Ketone: x  / Bili: x / Urobili: x   Blood: x / Protein: x / Nitrite: x   Leuk Esterase: x / RBC: x / WBC x   Sq Epi: x / Non Sq Epi: x / Bacteria: x        Medications:  aMIOdarone    Tablet 200 milliGRAM(s) Oral daily  apixaban 5 milliGRAM(s) Oral every 12 hours  atorvastatin 40 milliGRAM(s) Oral at bedtime  chlorhexidine 2% Cloths 1 Application(s) Topical <User Schedule>  clopidogrel Tablet 75 milliGRAM(s) Oral daily  dextrose 50% Injectable 25 Gram(s) IV Push once  dextrose 50% Injectable 12.5 Gram(s) IV Push once  dextrose 50% Injectable 25 Gram(s) IV Push once  ezetimibe 10 milliGRAM(s) Oral daily  famotidine    Tablet 20 milliGRAM(s) Oral daily  fenofibrate Tablet 145 milliGRAM(s) Oral daily  glucagon  Injectable 1 milliGRAM(s) IntraMuscular once  insulin glargine Injectable (LANTUS) 10 Unit(s) SubCutaneous at bedtime  insulin lispro (ADMELOG) corrective regimen sliding scale   SubCutaneous at bedtime  insulin lispro (ADMELOG) corrective regimen sliding scale   SubCutaneous three times a day before meals  metoprolol succinate ER 50 milliGRAM(s) Oral daily  polyethylene glycol 3350 17 Gram(s) Oral two times a day  QUEtiapine 12.5 milliGRAM(s) Oral at bedtime  senna 2 Tablet(s) Oral at bedtime    Drips:  dextrose 5%. 1000 milliLiter(s) (50 mL/Hr) IV Continuous <Continuous>    PRN:     Allergies    sulfa drugs (Unknown)    Intolerances

## 2025-01-16 NOTE — DISCHARGE NOTE PROVIDER - NSDCFUSCHEDAPPT_GEN_ALL_CORE_FT
[facility]  CARDIOLOGY [address]  Scheduled Appointment: 01/22/2025    [facility]  CARDIOLOGY [address]  Scheduled Appointment: 02/06/2025    [facility]  CARDIOLOGY [address]  Scheduled Appointment: 02/06/2025    [facility]  CARDIOLOGY [address]  Scheduled Appointment: 02/06/2025    Kristin Card  [facility]  CARDIOLOGY [address]  Scheduled Appointment: 02/11/2025    Cecilia Carrera  [facility]  ELECTROPH [address]  Scheduled Appointment: 02/19/2025

## 2025-01-16 NOTE — DISCHARGE NOTE PROVIDER - CARE PROVIDER_API CALL
Kristin Card  Cardiovascular Disease  [address] [place] [address]  Phone: [phone]  Fax: [phone]  Follow Up Time: 2 weeks    Cecilia Carrera  Cardiovascular Disease  [address] [place] [address]  Phone: [phone]  Fax: [phone]  Scheduled Appointment: 02/19/2025 12:30 PM

## 2025-01-16 NOTE — PROGRESS NOTE ADULT - SUBJECTIVE AND OBJECTIVE BOX
Letter of necessity note:    The beneficiary is able to safely use the walker; and - The functional mobility deficit can be sufficiently resolved with use of a walker Letter of necessity note:  Patient with dx Acute on chronic Heart failure , AFib sp Ablation. During the course of admission was also in acute respiratory failure requiring mechanical ventilation intubatation. All contributing to functional deconditioning.   The beneficiary is able to safely use the walker; and - The functional mobility deficit can be sufficiently resolved with use of a walker Letter of necessity note:  Patient with dx Acute on chronic Heart failure , AFib sp Ablation. During the course of admission was also in acute respiratory failure requiring mechanical ventilation intubation. All contributing to functional deconditioning.   The beneficiary is able to safely use the walker; and - The functional mobility deficit can be sufficiently resolved with use of a walker

## 2025-01-16 NOTE — ED PROVIDER NOTE - PHYSICAL EXAMINATION
VITAL SIGNS: I have reviewed nursing notes and confirm.  CONSTITUTIONAL: chronically ill-appearing  SKIN:  abrasions to bilateral shins  HEAD: Normocephalic; atraumatic.  EYES: ; conjunctiva and sclera clear.  ENT: No nasal discharge; airway clear.  CARD: S1, S2 normal; no murmurs, gallops, or rubs. Regular rate and rhythm.   RESP: No wheezes, rales or rhonchi.  ABD: Normal bowel sounds; soft; non-distended; non-tender  EXT: Normal ROM.  No clubbing, cyanosis or edema.   NEURO: Alert, oriented, grossly unremarkable

## 2025-01-16 NOTE — DISCHARGE NOTE PROVIDER - PROVIDER TOKENS
PROVIDER:[TOKEN:[[id number]],FOLLOWUP:[2 weeks]],PROVIDER:[TOKEN:[[id number]],SCHEDULEDAPPT:[02/19/2025],SCHEDULEDAPPTTIME:[12:30 PM]]

## 2025-01-17 LAB
GLUCOSE BLDC GLUCOMTR-MCNC: 232 MG/DL — HIGH (ref 70–99)
GLUCOSE BLDC GLUCOMTR-MCNC: 237 MG/DL — HIGH (ref 70–99)

## 2025-01-17 PROCEDURE — 99232 SBSQ HOSP IP/OBS MODERATE 35: CPT

## 2025-01-17 RX ORDER — PANTOPRAZOLE 20 MG/1
40 TABLET, DELAYED RELEASE ORAL
Refills: 0 | Status: DISCONTINUED | OUTPATIENT
Start: 2025-01-17 | End: 2025-01-19

## 2025-01-17 RX ADMIN — SACUBITRIL AND VALSARTAN 1 TABLET(S): 49; 51 TABLET, FILM COATED ORAL at 17:40

## 2025-01-17 RX ADMIN — Medication 2: at 08:16

## 2025-01-17 RX ADMIN — Medication 2: at 21:55

## 2025-01-17 RX ADMIN — AMIODARONE HYDROCHLORIDE 200 MILLIGRAM(S): 50 INJECTION, SOLUTION INTRAVENOUS at 06:53

## 2025-01-17 RX ADMIN — Medication 1: at 12:05

## 2025-01-17 RX ADMIN — SACUBITRIL AND VALSARTAN 1 TABLET(S): 49; 51 TABLET, FILM COATED ORAL at 05:49

## 2025-01-17 RX ADMIN — TORSEMIDE 20 MILLIGRAM(S): 20 TABLET ORAL at 05:49

## 2025-01-17 RX ADMIN — PANTOPRAZOLE 40 MILLIGRAM(S): 20 TABLET, DELAYED RELEASE ORAL at 06:54

## 2025-01-17 RX ADMIN — Medication 10 MILLIGRAM(S): at 17:39

## 2025-01-17 RX ADMIN — Medication 50 MILLIGRAM(S): at 05:49

## 2025-01-17 RX ADMIN — Medication 4: at 17:40

## 2025-01-17 RX ADMIN — APIXABAN 5 MILLIGRAM(S): 5 TABLET, FILM COATED ORAL at 05:49

## 2025-01-17 RX ADMIN — Medication 75 MILLIGRAM(S): at 12:08

## 2025-01-17 RX ADMIN — ANTISEPTIC SURGICAL SCRUB 1 APPLICATION(S): 0.04 SOLUTION TOPICAL at 05:42

## 2025-01-17 RX ADMIN — ATORVASTATIN CALCIUM 40 MILLIGRAM(S): 80 TABLET, FILM COATED ORAL at 21:55

## 2025-01-17 RX ADMIN — APIXABAN 5 MILLIGRAM(S): 5 TABLET, FILM COATED ORAL at 17:39

## 2025-01-17 NOTE — OCCUPATIONAL THERAPY INITIAL EVALUATION ADULT - PERTINENT HX OF CURRENT PROBLEM, REHAB EVAL
69-year-old male history of CHF, AICD, AVR, hypertension, paroxysmal A-fib status post ablation (1/14/25), CAD, diabetes, hyperlipidemia, COPD not on home O2 recently admitted for CHF exacerbation discharged last night returns today after having a witnessed fall due to generalized weakness. pt suffered mechanical fall from standing, witnessed by son,  No LOC, no head trauma. Patient sustained abrasions to bilateral shins. Patient was urged to follow-up with rehabilitation and refused placement at [facility] on last admission. Patient denies head trauma, LOC, neck pain, chest pain.     X-ray (B) knee 1/16:  Intact knee joints with no acute fracture, dislocation or joint effusion. Bilateral residual Osgood-Schlatter's disease. Left quadriceps and patellar tendon enthesophytes. Right quadriceps tendon enthesophyte. Bilateral soft tissue vascular calcification

## 2025-01-17 NOTE — PATIENT PROFILE ADULT - FALL HARM RISK - RISK INTERVENTIONS
Assistance OOB with selected safe patient handling equipment/Assistance with ambulation/Communicate Fall Risk and Risk Factors to all staff, patient, and family/Reinforce activity limits and safety measures with patient and family/Use of alarms - bed, chair and/or voice tab/Visual Cue: Yellow wristband/Bed in lowest position, wheels locked, appropriate side rails in place/Call bell, personal items and telephone in reach/Instruct patient to call for assistance before getting out of bed or chair/Non-slip footwear when patient is out of bed/[place] to call system/Physically safe environment - no spills, clutter or unnecessary equipment/Purposeful Proactive Rounding/Room/bathroom lighting operational, light cord in reach

## 2025-01-17 NOTE — PHYSICAL THERAPY INITIAL EVALUATION ADULT - PERTINENT HX OF CURRENT PROBLEM, REHAB EVAL
69-year-old male history of CHF, AICD, AVR, hypertension, paroxysmal A-fib status post ablation (1/14/25), CAD, diabetes, hyperlipidemia, COPD not on home O2 recently admitted for CHF exacerbation discharged last night returns today after having a witnessed fall due to generalized weakness. pt suffered mechanical fall from standing, witnessed by son,  No LOC, no head trauma. Patient sustained abrasions to bilateral shins. Patient was urged to follow-up with rehabilitation and refused placement at [facility] on last admission. Patient denies head trauma, LOC, neck pain, chest pain.

## 2025-01-17 NOTE — PHYSICAL THERAPY INITIAL EVALUATION ADULT - ADDITIONAL COMMENTS
Pt states PTA he lives in a PH with wife and son, 9 [address] with handrail on the left, flight of stairs inside of home to get to bedroom with handrail on left.   use RW at [facility]

## 2025-01-17 NOTE — OCCUPATIONAL THERAPY INITIAL EVALUATION ADULT - GENERAL OBSERVATIONS, REHAB EVAL
14:27-14:50; chart reviewed, ok to treat by Occupational Therapist as confirmed by RN Yamilet, Pt received seated in ASU recliner +condom cath +heplock in NAD. Pt in agreement with OT IE.

## 2025-01-17 NOTE — PHYSICAL THERAPY INITIAL EVALUATION ADULT - BALANCE DISTURBANCE, SYSTEM IMPAIRMENT CONTRIBUTE, REHAB EVAL
Begin clears and advance to high fiber diet   Call for any signs of infection, increasing abdominal pain or significant rectal bleeding  Call office 1 week for pathology  Next colonoscopy based on pathology -likely 5 years   musculoskeletal

## 2025-01-17 NOTE — PROGRESS NOTE ADULT - ASSESSMENT
69-year-old male history of CHF, AICD, AVR, hypertension, paroxysmal A-fib status post ablation (1/14/25), CAD, diabetes, hyperlipidemia, COPD not on home O2 recently admitted for CHF exacerbation discharged last night returns today after having a witnessed fall due to generalized weakness.    Mechanical Fall  Just discharged same day after hospitalization for CHF and afib ablation, refused rehab   Hx HFrEF/ ICM s/p CRT-D  Hx COPD  Hx afib/flutter with recent ablation  Hx CAD, CVA, DM, HLD    -Just discharged, refused rehab, came back as fell at home  -PT eval>>still needs rehab, CM f/u  -Pt refused everything including bloodwork on admission, instructed pt to let us obtain bloodwork tomorrow while hospitalized, appeared to be in agreement for now. He did sign refusal of treatment form on admission  -appears euvolemic, monitor volume status closely  -c/w home meds     Medically ready, CM f/u for placement, pt needs to go to rehab

## 2025-01-17 NOTE — PHYSICAL THERAPY INITIAL EVALUATION ADULT - CRITERIA FOR SKILLED THERAPEUTIC INTERVENTIONS
impairments found/functional limitations in following categories/rehab potential/therapy frequency/predicted duration of therapy intervention/anticipated equipment needs at discharge/anticipated discharge recommendation right upper extremity/left upper extremity

## 2025-01-17 NOTE — PHYSICAL THERAPY INITIAL EVALUATION ADULT - GENERAL OBSERVATIONS, REHAB EVAL
Chart reviewed. Order received.  Patient is ok to be  seen for PT,confirmed with RN. pt encountered  Semi smith in bed   C/o Abdominal  pain, and agrees to participate in session, +  Heplock  RUE NAD.

## 2025-01-17 NOTE — OCCUPATIONAL THERAPY INITIAL EVALUATION ADULT - IMPAIRED TRANSFERS: BED/CHAIR, REHAB EVAL
decreased endurance/impaired balance/impaired coordination/impaired postural control/decreased strength

## 2025-01-17 NOTE — OCCUPATIONAL THERAPY INITIAL EVALUATION ADULT - LIVES WITH, PROFILE
wife in a 2-family home (son lives on 1st floor) +9 steps to enter with (L) handrail +1 flight of steps with (L) handrail to bedroom +tub +shower chair +standard toilet/spouse

## 2025-01-18 PROCEDURE — 99232 SBSQ HOSP IP/OBS MODERATE 35: CPT | Mod: GC

## 2025-01-18 RX ORDER — INSULIN LISPRO 100/ML
5 VIAL (ML) SUBCUTANEOUS
Refills: 0 | Status: DISCONTINUED | OUTPATIENT
Start: 2025-01-18 | End: 2025-01-19

## 2025-01-18 RX ORDER — INSULIN GLARGINE-YFGN 100 [IU]/ML
12 INJECTION, SOLUTION SUBCUTANEOUS AT BEDTIME
Refills: 0 | Status: DISCONTINUED | OUTPATIENT
Start: 2025-01-18 | End: 2025-01-19

## 2025-01-18 RX ADMIN — Medication 50 MILLIGRAM(S): at 06:20

## 2025-01-18 RX ADMIN — APIXABAN 5 MILLIGRAM(S): 5 TABLET, FILM COATED ORAL at 18:05

## 2025-01-18 RX ADMIN — PANTOPRAZOLE 40 MILLIGRAM(S): 20 TABLET, DELAYED RELEASE ORAL at 06:20

## 2025-01-18 RX ADMIN — SACUBITRIL AND VALSARTAN 1 TABLET(S): 49; 51 TABLET, FILM COATED ORAL at 06:20

## 2025-01-18 RX ADMIN — INSULIN GLARGINE-YFGN 12 UNIT(S): 100 INJECTION, SOLUTION SUBCUTANEOUS at 22:45

## 2025-01-18 RX ADMIN — APIXABAN 5 MILLIGRAM(S): 5 TABLET, FILM COATED ORAL at 06:21

## 2025-01-18 RX ADMIN — SACUBITRIL AND VALSARTAN 1 TABLET(S): 49; 51 TABLET, FILM COATED ORAL at 18:05

## 2025-01-18 RX ADMIN — Medication 6: at 16:50

## 2025-01-18 RX ADMIN — Medication 75 MILLIGRAM(S): at 11:21

## 2025-01-18 RX ADMIN — Medication 2: at 22:23

## 2025-01-18 RX ADMIN — Medication 4: at 12:19

## 2025-01-18 RX ADMIN — TORSEMIDE 20 MILLIGRAM(S): 20 TABLET ORAL at 06:20

## 2025-01-18 RX ADMIN — ATORVASTATIN CALCIUM 40 MILLIGRAM(S): 80 TABLET, FILM COATED ORAL at 22:44

## 2025-01-18 RX ADMIN — Medication 10 MILLIGRAM(S): at 11:21

## 2025-01-18 RX ADMIN — ANTISEPTIC SURGICAL SCRUB 1 APPLICATION(S): 0.04 SOLUTION TOPICAL at 06:21

## 2025-01-18 RX ADMIN — Medication 5 UNIT(S): at 16:50

## 2025-01-18 RX ADMIN — AMIODARONE HYDROCHLORIDE 200 MILLIGRAM(S): 50 INJECTION, SOLUTION INTRAVENOUS at 06:21

## 2025-01-18 NOTE — PROGRESS NOTE ADULT - ASSESSMENT
69-year-old male history of CHF, AICD, AVR, hypertension, paroxysmal A-fib status post ablation (1/14/25), CAD, diabetes, hyperlipidemia, COPD not on home O2 recently admitted for CHF exacerbation discharged last night returns today after having a witnessed fall due to generalized weakness.    Mechanical Fall  Just discharged same day after hospitalization for CHF and afib ablation, refused rehab   Hx HFrEF/ ICM s/p CRT-D  Hx COPD  Hx afib/flutter with recent ablation  Hx CAD, CVA, DM, HLD    -Just discharged, refused rehab, came back as fell at home  -PT eval>>still needs rehab, CM f/u  -Pt refused everything including bloodwork on admission, instructed pt to let us obtain bloodwork tomorrow while hospitalized,  He did sign refusal of treatment form on admission  -appears euvolemic, monitor volume status closely  -c/w home meds     Medically ready, CM f/u for placement, pt needs to go to rehab  69-year-old male history of CHF, AICD, AVR, hypertension, paroxysmal A-fib status post ablation (1/14/25), CAD, diabetes, hyperlipidemia, COPD not on home O2 recently admitted for CHF exacerbation discharged last night returns today after having a witnessed fall due to generalized weakness.    Mechanical Fall  Just discharged same day after hospitalization for CHF and afib ablation, refused rehab   evaluated by PT, needs rehab placement    Hx HFrEF/ ICM s/p CRT-D  continue GDMT  euvolemic    Hx COPD  not in exacerbation    Hx afib/flutter with recent ablation  on eliquis    Hx CAD, CVA, DM, HLD  continue home meds    Dispo:    placement                  Medically ready, CM f/u for placement, pt needs to go to rehab

## 2025-01-18 NOTE — PROGRESS NOTE ADULT - SUBJECTIVE AND OBJECTIVE BOX
MONIQUE LYONS 69y Male  MRN#: [id number]     Hospital Day: 1d      SUBJECTIVE  No acute events overnight, pt seen and examined this morning.                                          ----------------------------------------------------------  OBJECTIVE  PAST MEDICAL & SURGICAL HISTORY  CHF (congestive heart failure)    Diabetes    CAD (coronary artery disease)    Chronic obstructive pulmonary disease (COPD)    HTN (hypertension)    Stented coronary artery    Dyslipidemia    GERD (gastroesophageal reflux disease)    Afib    CVA (cerebral vascular accident)    H/O heart artery stent    Heart valve replaced  aortic    History of Nissen fundoplication                                              -----------------------------------------------------------  ALLERGIES:  sulfa drugs (Unknown)                                            ------------------------------------------------------------    HOME MEDICATIONS  Home Medications:  atorvastatin 40 mg oral tablet: 1 tab(s) orally once a day (27 Dec 2024 11:11)  clopidogrel 75 mg oral tablet: 1 tab(s) orally once a day (26 Dec 2024 15:14)  eszopiclone 3 mg oral tablet: 1 tab(s) orally once a day (at bedtime) (26 Dec 2024 15:14)  ezetimibe 10 mg oral tablet: 1 orally once a day (26 Dec 2024 15:14)  fenofibrate 145 mg oral tablet: 1 orally once a day (26 Dec 2024 15:14)  metFORMIN 500 mg oral tablet: 1 tab(s) orally 2 times a day (26 Dec 2024 15:14)  Trulicity Pen 1.5 mg/0.5 mL subcutaneous solution: 1.5 milligram(s) subcutaneously once a week (26 Dec 2024 15:14)                           MEDICATIONS:  STANDING MEDICATIONS  aMIOdarone    Tablet 200 milliGRAM(s) Oral daily  apixaban 5 milliGRAM(s) Oral two times a day  atorvastatin 40 milliGRAM(s) Oral at bedtime  chlorhexidine 2% Cloths 1 Application(s) Topical <User Schedule>  clopidogrel Tablet 75 milliGRAM(s) Oral daily  dextrose 5%. 1000 milliLiter(s) IV Continuous <Continuous>  dextrose 5%. 1000 milliLiter(s) IV Continuous <Continuous>  dextrose 50% Injectable 25 Gram(s) IV Push once  dextrose 50% Injectable 12.5 Gram(s) IV Push once  dextrose 50% Injectable 25 Gram(s) IV Push once  ezetimibe 10 milliGRAM(s) Oral daily  glucagon  Injectable 1 milliGRAM(s) IntraMuscular once  insulin lispro (ADMELOG) corrective regimen sliding scale   SubCutaneous three times a day before meals  insulin lispro (ADMELOG) corrective regimen sliding scale   SubCutaneous at bedtime  metoprolol succinate ER 50 milliGRAM(s) Oral daily  pantoprazole    Tablet 40 milliGRAM(s) Oral before breakfast  sacubitril 24 mG/valsartan 26 mG 1 Tablet(s) Oral two times a day  torsemide 20 milliGRAM(s) Oral daily    PRN MEDICATIONS  acetaminophen     Tablet .. 650 milliGRAM(s) Oral every 6 hours PRN  aluminum hydroxide/magnesium hydroxide/simethicone Suspension 30 milliLiter(s) Oral every 4 hours PRN  dextrose Oral Gel 15 Gram(s) Oral once PRN  melatonin 3 milliGRAM(s) Oral at bedtime PRN                                            ------------------------------------------------------------  VITAL SIGNS: Last 24 Hours  T(C): 36.7 (17 Jan 2025 04:30), Max: 36.8 (17 Jan 2025 00:39)  T(F): 98.1 (17 Jan 2025 04:30), Max: 98.3 (17 Jan 2025 00:39)  HR: 73 (17 Jan 2025 04:30) (72 - 80)  BP: 132/83 (17 Jan 2025 04:30) (120/80 - 157/94)  BP(mean): 94 (16 Jan 2025 16:17) (94 - 94)  RR: 18 (17 Jan 2025 04:30) (18 - 18)  SpO2: 96% (17 Jan 2025 04:30) (92% - 97%)                                             --------------------------------------------------------------  LABS:                                                                    -------------------------------------------------------------  RADIOLOGY:  CXR  Xray Chest 1 View- PORTABLE-Urgent:   ACC: [id number] EXAM:  XR CHEST PORTABLE URGENT 1V   ORDERED BY: JAZLYN TIPTON     PROCEDURE DATE:  01/16/2025          INTERPRETATION:  CLINICAL HISTORY: Trauma    COMPARISON: Frontal chest January 13, 2025    TECHNIQUE: Portable frontal chestradiograph.    FINDINGS:    Support devices: Stable position left ICD.    Cardiac/mediastinum/hilum: Cardiomegaly, thoracic aortic calcification,   status post median sternotomy, CABG.    Lung parenchyma/Pleura: Bibasilar opacities/pleural effusions..    Skeleton/soft tissues: Stable bony structures. EG junction surgical clips   and staples.      IMPRESSION:    Bibasilar opacities/pleural effusions, decreased.. Redemonstration   cardiomegaly    --- End of Report ---            TORI CHRISTIANSEN MD;Attending Radiologist  This document has been electronically signed. Jan 17 2025  4:49AM (01-16-25 @ 22:27)      CT                                            --------------------------------------------------------------    PHYSICAL EXAM:  GENERAL: NAD, lying in bed comfortably  CHEST/LUNG: Clear to auscultation bilaterally; No rales, rhonchi, wheezing, or rubs. Unlabored respirations  HEART: Regular rate and rhythm; No murmurs, rubs, or gallops  ABDOMEN: Soft, nontender, nondistended  EXTREMITIES:  No clubbing, cyanosis, or edema  NERVOUS SYSTEM:  A&Ox3        
  Patient is a 70y old  Male who presents with a chief complaint of s/p fall (17 Jan 2025 11:59)      overnight events: none    Drips: none    HPI:          ROS: as in HPI; All other systems reviewed are negative        PHYSICAL EXAM  Vital Signs Last 24 Hrs  T(C): 36.8 (18 Jan 2025 04:57), Max: 36.8 (18 Jan 2025 04:57)  T(F): 98.3 (18 Jan 2025 04:57), Max: 98.3 (18 Jan 2025 04:57)  HR: 75 (18 Jan 2025 04:57) (75 - 80)  BP: 157/77 (18 Jan 2025 04:57) (100/66 - 157/77)  BP(mean): --  RR: 18 (18 Jan 2025 04:57) (16 - 18)  SpO2: 98% (18 Jan 2025 04:57) (96% - 100%)    Parameters below as of 18 Jan 2025 04:57  Patient On (Oxygen Delivery Method): room air          CONSTITUTIONAL:  NAD    ENT:   Airway patent,     EYES:   Clear bilaterally,   pupils equal,   round and reactive to light.    CARDIAC:   Normal rate,   regular rhythm.    no edema    RESPIRATORY:   No wheezing   Normal chest expansion  Not tachypneic,    GASTROINTESTINAL:  Abdomen soft, non-tender,   No guarding,   Positive BS    MUSCULOSKELETAL:   Range of motion is not limited,    NEUROLOGICAL:   No motor deficits.    SKIN:   Skin normal color for race,   No evidence of rash.                I&O's Detail    17 Jan 2025 07:01  -  18 Jan 2025 07:00  --------------------------------------------------------  IN:  Total IN: 0 mL    OUT:    Voided (mL): 900 mL  Total OUT: 900 mL    Total NET: -900 mL            LABS:                CAPILLARY BLOOD GLUCOSE      POCT Blood Glucose.: 327 mg/dL (17 Jan 2025 21:05)        Culture        MEDICATIONS  (STANDING):  aMIOdarone    Tablet 200 milliGRAM(s) Oral daily  apixaban 5 milliGRAM(s) Oral two times a day  atorvastatin 40 milliGRAM(s) Oral at bedtime  chlorhexidine 2% Cloths 1 Application(s) Topical <User Schedule>  clopidogrel Tablet 75 milliGRAM(s) Oral daily  dextrose 5%. 1000 milliLiter(s) (50 mL/Hr) IV Continuous <Continuous>  dextrose 5%. 1000 milliLiter(s) (100 mL/Hr) IV Continuous <Continuous>  dextrose 50% Injectable 25 Gram(s) IV Push once  dextrose 50% Injectable 12.5 Gram(s) IV Push once  dextrose 50% Injectable 25 Gram(s) IV Push once  ezetimibe 10 milliGRAM(s) Oral daily  glucagon  Injectable 1 milliGRAM(s) IntraMuscular once  insulin lispro (ADMELOG) corrective regimen sliding scale   SubCutaneous three times a day before meals  insulin lispro (ADMELOG) corrective regimen sliding scale   SubCutaneous at bedtime  metoprolol succinate ER 50 milliGRAM(s) Oral daily  pantoprazole    Tablet 40 milliGRAM(s) Oral before breakfast  sacubitril 24 mG/valsartan 26 mG 1 Tablet(s) Oral two times a day  torsemide 20 milliGRAM(s) Oral daily    MEDICATIONS  (PRN):  acetaminophen     Tablet .. 650 milliGRAM(s) Oral every 6 hours PRN Temp greater or equal to 38C (100.4F), Mild Pain (1 - 3)  aluminum hydroxide/magnesium hydroxide/simethicone Suspension 30 milliLiter(s) Oral every 4 hours PRN Dyspepsia  dextrose Oral Gel 15 Gram(s) Oral once PRN Blood Glucose LESS THAN 70 milliGRAM(s)/deciliter  melatonin 3 milliGRAM(s) Oral at bedtime PRN Insomnia

## 2025-01-19 VITALS
TEMPERATURE: 97 F | OXYGEN SATURATION: 99 % | HEART RATE: 68 BPM | SYSTOLIC BLOOD PRESSURE: 104 MMHG | DIASTOLIC BLOOD PRESSURE: 67 MMHG | RESPIRATION RATE: 16 BRPM

## 2025-01-19 LAB
GLUCOSE BLDC GLUCOMTR-MCNC: 277 MG/DL — HIGH (ref 70–99)
GLUCOSE BLDC GLUCOMTR-MCNC: 280 MG/DL — HIGH (ref 70–99)

## 2025-01-19 PROCEDURE — 99239 HOSP IP/OBS DSCHRG MGMT >30: CPT

## 2025-01-19 RX ORDER — ACETAMINOPHEN 160 MG/5ML
2 SUSPENSION ORAL
Qty: 0 | Refills: 0 | DISCHARGE
Start: 2025-01-19

## 2025-01-19 RX ADMIN — APIXABAN 5 MILLIGRAM(S): 5 TABLET, FILM COATED ORAL at 17:59

## 2025-01-19 RX ADMIN — Medication 1: at 07:47

## 2025-01-19 RX ADMIN — ANTISEPTIC SURGICAL SCRUB 1 APPLICATION(S): 0.04 SOLUTION TOPICAL at 06:11

## 2025-01-19 RX ADMIN — SACUBITRIL AND VALSARTAN 1 TABLET(S): 49; 51 TABLET, FILM COATED ORAL at 17:59

## 2025-01-19 RX ADMIN — Medication 5 UNIT(S): at 07:47

## 2025-01-19 RX ADMIN — TORSEMIDE 20 MILLIGRAM(S): 20 TABLET ORAL at 06:06

## 2025-01-19 RX ADMIN — SACUBITRIL AND VALSARTAN 1 TABLET(S): 49; 51 TABLET, FILM COATED ORAL at 06:06

## 2025-01-19 RX ADMIN — Medication 5 UNIT(S): at 12:04

## 2025-01-19 RX ADMIN — Medication 10 MILLIGRAM(S): at 10:57

## 2025-01-19 RX ADMIN — Medication 3: at 17:59

## 2025-01-19 RX ADMIN — APIXABAN 5 MILLIGRAM(S): 5 TABLET, FILM COATED ORAL at 06:06

## 2025-01-19 RX ADMIN — Medication 50 MILLIGRAM(S): at 06:06

## 2025-01-19 RX ADMIN — AMIODARONE HYDROCHLORIDE 200 MILLIGRAM(S): 50 INJECTION, SOLUTION INTRAVENOUS at 06:11

## 2025-01-19 RX ADMIN — Medication 5 UNIT(S): at 18:00

## 2025-01-19 RX ADMIN — Medication 3: at 12:04

## 2025-01-19 RX ADMIN — Medication 75 MILLIGRAM(S): at 10:57

## 2025-01-19 RX ADMIN — PANTOPRAZOLE 40 MILLIGRAM(S): 20 TABLET, DELAYED RELEASE ORAL at 06:06

## 2025-01-19 NOTE — DISCHARGE NOTE PROVIDER - CARE PROVIDER_API CALL
Kristin Card  Cardiovascular Disease  [address] [place] [address]  Phone: [phone]  Fax: [phone]  Follow Up Time: 1 week    Cecilia Carrera  Cardiovascular Disease  [address] [place] [address]  Phone: [phone]  Fax: [phone]  Follow Up Time:

## 2025-01-19 NOTE — DISCHARGE NOTE NURSING/CASE MANAGEMENT/SOCIAL WORK - FINANCIAL ASSISTANCE
[facility] provides services at a reduced cost to those who are determined to be eligible through [facility]’s financial assistance program. Information regarding [facility]’s financial assistance program can be found by going to [email or web address] or by calling [phone].

## 2025-01-19 NOTE — DISCHARGE NOTE PROVIDER - NSDCCPCAREPLAN_GEN_ALL_CORE_FT
PRINCIPAL DISCHARGE DIAGNOSIS  Diagnosis: Physical debility  Assessment and Plan of Treatment: sp fall without injury at home after hospitalization - requiring physicial therapy prior to going home again- follow up at skilled nursing facility      SECONDARY DISCHARGE DIAGNOSES  Diagnosis: Chronic systolic congestive heart failure  Assessment and Plan of Treatment: Assessment and Plan of Treatment: - start taking torsemide 20mg daily. If worsening shortness of breathe, leg swelliung or weight gain take 2 tablets of torsemide (40 mg ) and call your cardiologist  monitor daily weight, low salt diet and follow up with cardio    Diagnosis: Afib  Assessment and Plan of Treatment: PRINCIPAL PROCEDURE  Procedure: Ablation, cardiac  Findings and Treatment:   - Please start taking famotidine 20 mg daily for 30 days.   - You should continue taking Eliquis  - Do not submerge in water (example: baths, swimming) for 1 week.  - No squatting, exertional activities, or lifting anything > 10 lbs for 1 week.  - Any sudden swelling, redness, fever, discharge, or severe pain, call the Electrophysiology Office at [phone].  To get better and follow your care plan as instructed.

## 2025-01-19 NOTE — DISCHARGE NOTE PROVIDER - NSDCFUSCHEDAPPT_GEN_ALL_CORE_FT
[facility]  CARDIOLOGY [address]  Scheduled Appointment: 01/22/2025    [facility]  CARDIOLOGY [address]  Scheduled Appointment: 02/06/2025    [facility]  CARDIOLOGY [address]  Scheduled Appointment: 02/06/2025    [facility]  CARDIOLOGY [address]  Scheduled Appointment: 02/06/2025    Kristin Card  [facility]  CARDIOLOGY [address]  Scheduled Appointment: 02/11/2025    Cecilia Carrera  [facility]  ELECTROPH [address]  Scheduled Appointment: 02/19/2025

## 2025-01-19 NOTE — DISCHARGE NOTE PROVIDER - HOSPITAL COURSE
see below FROM hospital course at [place] - was dc on 1/16/24 - had a fall at home after refusing SNF - was readmitted for SNF placment at     69-year-old male with past medical history of chronic HFrEF s/p ICD, AVR porcine, HTN, PAF s/p ablation, CAD s/p PCI, DM, HLD, COPD (no home O2), smoker presenting with worsening shortness of breath and weakness.  Patient was admitted within the last 24 hours for acute on chronic CHF exacerbation, was given IV Lasix and BiPAP, and left AMA.  Patient returns stating that he became more short of breath last night after leaving the hospital and would like to be readmitted for treatment.  Denies fever/chills, chest pain, abdominal pain, calf pain, N/V/D/C, cough, and nasal congestion.     [facility] Med/Tele/ICU  Course:  Patient admitted for acute on chronic systolic CHF vs Pneumonia. initial BNP 5K, leukocytosis, no fevers. CXR with bilateral lung opacities. Diuresed with Lasix 40mg, received IV ceftriaxone and azithromycin. TTE with EF 30-35%. On 12/31/24, a rapid response was called for tachycardia, chest pain, and hypoxia. Patient was noted to have multiple runs of Vtach on monitor. EKG with ventricular paced rhythm, O2 improved with supplemental oxygen, IV lasix and IV lopressor 5mg given, stabilized. Another rapid was called that same evening, with patient becoming more tachypneic, now hypoxic. CXR showed worsening fluid overload/flash pulmonary edema. Patient was becoming more somnolent and with increased labored breathing. [facility] discussions had with son Neto, patient remained full CODE. Decision was made to intubate patient and upgraded to ICU. Patient ultimately self extubated 1/4/2024 while doing spontaneous breathing trial. Remained off mechanical ventilation and was transitioned to bipap.  Finish course of cefepime 1/7/2025. After that monitor off abx. He was evaluated by EP who recommended transfer to [facility] for plan of ablation.     Patient was transferred to [facility] on the evening on 1/09/25. On 1/10/25, his CXR showed bilateral fluffy opacities. T up to 99.2 and WBC above 20. His lung findings could have been aspiration pneumonitis in the setting of confusion, worsening pulmonary edema despite ongoing diuresis and RAP 3, and/or pneumonia. He was been diuresed and his CXR improved without abx. Patient was started on seroquel 12.5 mg nightly for delirium.   On (1/14/25)  patient underwent successful PFA afib ablation, Patient was monitored overnight. On POD 1 patient remains HD stable with no complaints. Patient remains in SR with no arrhythmias noted on tele. Examination of B/L common femoral venous access sites reveal a Clear and dry area with no hematoma or erythema. For PVI- Patient should continue Eliquis for thromboembolic prophylaxis. Patient started on PO torsemide, had a repeat TTE ( 1/15) revealing LVEF 43% with global hypokinesis, mildly increased LV wall thickness, RV size is mildly enlarged with normal RV function, moderate LAE, normally functioning bioprosthetic AVR, mild MR, mild TR, mild pHTN with RAP 8 mmHg  Patient was evaluated by PT and recommended rehab. Patient declined rehab and discussed with son and spouse who are okay to assist patient at home  . Patient will be discharged home with home health service including PT. Patient will be discharged home on eliquis 5mg BID, palvix 75mg daily, amiodarone 200mg, torsemide 20mg daily, Toprol xl 50mg daily, entresto 24/26mg BID, zetia 10mg, fenofibrate 145mg.   Discharge Diagnoses, Assessment and Plan of Treatment  PRINCIPAL DISCHARGE DIAGNOSIS  Diagnosis: CHF exacerbation  Assessment and Plan of Treatment: - start taking torsemide 20mg daily. If worsening shortness of breathe, leg swelliung or weight gain take 2 tablets of torsemide (40 mg ) and call your cardiologist      SECONDARY DISCHARGE DIAGNOSES  Diagnosis: Other persistent atrial fibrillation  Assessment and Plan of Treatment:  PRINCIPAL DISCHARGE DIAGNOSIS  Diagnosis: CHF exacerbation  Assessment and Plan of Treatment: - start taking torsemide 20mg daily. If worsening shortness of breathe, leg swelliung or weight gain take 2 tablets of torsemide (40 mg ) and call your cardiologist      SECONDARY DISCHARGE DIAGNOSES  Diagnosis: Other persistent atrial fibrillation  Assessment and Plan of Treatment:  Discharge Procedures, Findings and Treatment  PRINCIPAL PROCEDURE  Procedure: Ablation, cardiac  Findings and Treatment:   - Please start taking famotidine 20 mg daily for 30 days.   - You should continue taking Eliquis  - Do not submerge in water (example: baths, swimming) for 1 week.  - No squatting, exertional activities, or lifting anything > 10 lbs for 1 week.  - Any sudden swelling, redness, fever, discharge, or severe pain, call the Electrophysiology Office at [phone].

## 2025-01-19 NOTE — DISCHARGE NOTE NURSING/CASE MANAGEMENT/SOCIAL WORK - PATIENT PORTAL LINK FT
You can access the FollowMyHealth Patient Portal offered by [facility] by registering at the following website: [email or web address]. By joining [employer]’s FollowMyHealth portal, you will also be able to view your health information using other applications (apps) compatible with our system.

## 2025-01-19 NOTE — DISCHARGE NOTE NURSING/CASE MANAGEMENT/SOCIAL WORK - NSDCPEFALRISK_GEN_ALL_CORE
For information on Fall & Injury Prevention, visit: [email or web address] OR  [email or web address] OR  https://www.cdc.gov/steadi/patient.html

## 2025-01-19 NOTE — DISCHARGE NOTE PROVIDER - NSDCMRMEDTOKEN_GEN_ALL_CORE_FT
acetaminophen 325 mg oral tablet: 2 tab(s) orally every 6 hours As needed Temp greater or equal to 38C (100.4F), Mild Pain (1 - 3)  amiodarone 200 mg oral tablet: 1 tab(s) orally once a day  apixaban 5 mg oral tablet: 1 tab(s) orally every 12 hours  atorvastatin 40 mg oral tablet: 1 tab(s) orally once a day  clopidogrel 75 mg oral tablet: 1 tab(s) orally once a day  Entresto 24 mg-26 mg oral tablet: 1 tab(s) orally 2 times a day  eszopiclone 3 mg oral tablet: 1 tab(s) orally once a day (at bedtime)  ezetimibe 10 mg oral tablet: 1 orally once a day  famotidine 20 mg oral tablet: 1 tab(s) orally once a day  fenofibrate 145 mg oral tablet: 1 orally once a day  metFORMIN 500 mg oral tablet: 1 tab(s) orally 2 times a day  metoprolol succinate 50 mg oral tablet, extended release: 1 tab(s) orally once a day  torsemide 20 mg oral tablet: 1 tab(s) orally once a day  Tresiba 100 units/mL subcutaneous solution: 32 unit(s) subcutaneous once a day (in the morning)  Trulicity Pen 1.5 mg/0.5 mL subcutaneous solution: 1.5 milligram(s) subcutaneously once a week

## 2025-01-30 DIAGNOSIS — Z79.02 LONG TERM (CURRENT) USE OF ANTITHROMBOTICS/ANTIPLATELETS: ICD-10-CM

## 2025-01-30 DIAGNOSIS — Z79.01 LONG TERM (CURRENT) USE OF ANTICOAGULANTS: ICD-10-CM

## 2025-01-30 DIAGNOSIS — I25.10 ATHEROSCLEROTIC HEART DISEASE OF NATIVE CORONARY ARTERY WITHOUT ANGINA PECTORIS: ICD-10-CM

## 2025-01-30 DIAGNOSIS — Z95.810 PRESENCE OF AUTOMATIC (IMPLANTABLE) CARDIAC DEFIBRILLATOR: ICD-10-CM

## 2025-01-30 DIAGNOSIS — Z79.4 LONG TERM (CURRENT) USE OF INSULIN: ICD-10-CM

## 2025-01-30 DIAGNOSIS — I35.0 NONRHEUMATIC AORTIC (VALVE) STENOSIS: ICD-10-CM

## 2025-01-30 DIAGNOSIS — I50.23 ACUTE ON CHRONIC SYSTOLIC (CONGESTIVE) HEART FAILURE: ICD-10-CM

## 2025-01-30 DIAGNOSIS — E78.5 HYPERLIPIDEMIA, UNSPECIFIED: ICD-10-CM

## 2025-01-30 DIAGNOSIS — I48.19 OTHER PERSISTENT ATRIAL FIBRILLATION: ICD-10-CM

## 2025-01-30 DIAGNOSIS — Z95.3 PRESENCE OF XENOGENIC HEART VALVE: ICD-10-CM

## 2025-01-30 DIAGNOSIS — Z91.81 HISTORY OF FALLING: ICD-10-CM

## 2025-01-30 DIAGNOSIS — R29.6 REPEATED FALLS: ICD-10-CM

## 2025-01-30 DIAGNOSIS — Z95.5 PRESENCE OF CORONARY ANGIOPLASTY IMPLANT AND GRAFT: ICD-10-CM

## 2025-01-30 DIAGNOSIS — Z86.73 PERSONAL HISTORY OF TRANSIENT ISCHEMIC ATTACK (TIA), AND CEREBRAL INFARCTION WITHOUT RESIDUAL DEFICITS: ICD-10-CM

## 2025-01-30 DIAGNOSIS — Z79.84 LONG TERM (CURRENT) USE OF ORAL HYPOGLYCEMIC DRUGS: ICD-10-CM

## 2025-01-30 DIAGNOSIS — G92.8 OTHER TOXIC ENCEPHALOPATHY: ICD-10-CM

## 2025-01-30 DIAGNOSIS — F17.200 NICOTINE DEPENDENCE, UNSPECIFIED, UNCOMPLICATED: ICD-10-CM

## 2025-01-30 DIAGNOSIS — E66.9 OBESITY, UNSPECIFIED: ICD-10-CM

## 2025-01-30 DIAGNOSIS — I11.0 HYPERTENSIVE HEART DISEASE WITH HEART FAILURE: ICD-10-CM

## 2025-01-30 DIAGNOSIS — Z53.20 PROCEDURE AND TREATMENT NOT CARRIED OUT BECAUSE OF PATIENT'S DECISION FOR UNSPECIFIED REASONS: ICD-10-CM

## 2025-01-30 DIAGNOSIS — I27.20 PULMONARY HYPERTENSION, UNSPECIFIED: ICD-10-CM

## 2025-01-30 DIAGNOSIS — I08.1 RHEUMATIC DISORDERS OF BOTH MITRAL AND TRICUSPID VALVES: ICD-10-CM

## 2025-01-30 DIAGNOSIS — Z11.52 ENCOUNTER FOR SCREENING FOR COVID-19: ICD-10-CM

## 2025-01-30 DIAGNOSIS — Z79.85 LONG-TERM (CURRENT) USE OF INJECTABLE NON-INSULIN ANTIDIABETIC DRUGS: ICD-10-CM

## 2025-01-30 DIAGNOSIS — Z88.2 ALLERGY STATUS TO SULFONAMIDES: ICD-10-CM

## 2025-01-30 DIAGNOSIS — J44.9 CHRONIC OBSTRUCTIVE PULMONARY DISEASE, UNSPECIFIED: ICD-10-CM

## 2025-01-30 DIAGNOSIS — Z72.0 TOBACCO USE: ICD-10-CM

## 2025-01-30 DIAGNOSIS — N17.0 ACUTE KIDNEY FAILURE WITH TUBULAR NECROSIS: ICD-10-CM

## 2025-01-30 DIAGNOSIS — J69.0 PNEUMONITIS DUE TO INHALATION OF FOOD AND VOMIT: ICD-10-CM

## 2025-01-30 DIAGNOSIS — Z04.89 ENCOUNTER FOR EXAMINATION AND OBSERVATION FOR OTHER SPECIFIED REASONS: ICD-10-CM

## 2025-01-30 DIAGNOSIS — R06.02 SHORTNESS OF BREATH: ICD-10-CM

## 2025-01-30 DIAGNOSIS — E11.9 TYPE 2 DIABETES MELLITUS WITHOUT COMPLICATIONS: ICD-10-CM

## 2025-01-30 DIAGNOSIS — Z95.1 PRESENCE OF AORTOCORONARY BYPASS GRAFT: ICD-10-CM

## 2025-01-30 DIAGNOSIS — F05 DELIRIUM DUE TO KNOWN PHYSIOLOGICAL CONDITION: ICD-10-CM

## 2025-01-30 DIAGNOSIS — Z78.1 PHYSICAL RESTRAINT STATUS: ICD-10-CM

## 2025-01-30 DIAGNOSIS — R53.81 OTHER MALAISE: ICD-10-CM

## 2025-01-30 DIAGNOSIS — I95.9 HYPOTENSION, UNSPECIFIED: ICD-10-CM

## 2025-01-30 DIAGNOSIS — I50.22 CHRONIC SYSTOLIC (CONGESTIVE) HEART FAILURE: ICD-10-CM

## 2025-01-30 DIAGNOSIS — I47.20 VENTRICULAR TACHYCARDIA, UNSPECIFIED: ICD-10-CM

## 2025-01-30 DIAGNOSIS — I25.5 ISCHEMIC CARDIOMYOPATHY: ICD-10-CM

## 2025-01-30 DIAGNOSIS — K21.9 GASTRO-ESOPHAGEAL REFLUX DISEASE WITHOUT ESOPHAGITIS: ICD-10-CM

## 2025-01-30 DIAGNOSIS — D84.9 IMMUNODEFICIENCY, UNSPECIFIED: ICD-10-CM

## 2025-01-30 DIAGNOSIS — J96.01 ACUTE RESPIRATORY FAILURE WITH HYPOXIA: ICD-10-CM

## 2025-02-06 ENCOUNTER — APPOINTMENT (OUTPATIENT)
Dept: CARDIOLOGY | Facility: CLINIC | Age: 70
End: 2025-02-06

## 2025-02-11 ENCOUNTER — APPOINTMENT (OUTPATIENT)
Dept: CARDIOLOGY | Facility: CLINIC | Age: 70
End: 2025-02-11

## 2025-03-24 ENCOUNTER — APPOINTMENT (OUTPATIENT)
Dept: ELECTROPHYSIOLOGY | Facility: CLINIC | Age: 70
End: 2025-03-24

## 2025-03-28 ENCOUNTER — INPATIENT (INPATIENT)
Facility: HOSPITAL | Age: 70
LOS: 4 days | Discharge: HOME HEALTH | DRG: 204 | End: 2025-04-02
Attending: STUDENT IN AN ORGANIZED HEALTH CARE EDUCATION/TRAINING PROGRAM | Admitting: STUDENT IN AN ORGANIZED HEALTH CARE EDUCATION/TRAINING PROGRAM
Payer: MEDICARE

## 2025-03-28 VITALS
HEART RATE: 77 BPM | DIASTOLIC BLOOD PRESSURE: 85 MMHG | SYSTOLIC BLOOD PRESSURE: 140 MMHG | OXYGEN SATURATION: 99 % | RESPIRATION RATE: 18 BRPM

## 2025-03-28 DIAGNOSIS — Z95.5 PRESENCE OF CORONARY ANGIOPLASTY IMPLANT AND GRAFT: Chronic | ICD-10-CM

## 2025-03-28 DIAGNOSIS — Z95.2 PRESENCE OF PROSTHETIC HEART VALVE: Chronic | ICD-10-CM

## 2025-03-28 DIAGNOSIS — Z98.890 OTHER SPECIFIED POSTPROCEDURAL STATES: Chronic | ICD-10-CM

## 2025-03-28 DIAGNOSIS — R06.02 SHORTNESS OF BREATH: ICD-10-CM

## 2025-03-28 LAB
ALBUMIN SERPL ELPH-MCNC: 3.9 G/DL — SIGNIFICANT CHANGE UP (ref 3.5–5.2)
ALP SERPL-CCNC: 100 U/L — SIGNIFICANT CHANGE UP (ref 30–115)
ALT FLD-CCNC: 17 U/L — SIGNIFICANT CHANGE UP (ref 0–41)
ANION GAP SERPL CALC-SCNC: 15 MMOL/L — HIGH (ref 7–14)
AST SERPL-CCNC: 26 U/L — SIGNIFICANT CHANGE UP (ref 0–41)
BASOPHILS # BLD AUTO: 0.14 K/UL — SIGNIFICANT CHANGE UP (ref 0–0.2)
BASOPHILS NFR BLD AUTO: 0.7 % — SIGNIFICANT CHANGE UP (ref 0–1)
BILIRUB SERPL-MCNC: 0.4 MG/DL — SIGNIFICANT CHANGE UP (ref 0.2–1.2)
BUN SERPL-MCNC: 18 MG/DL — SIGNIFICANT CHANGE UP (ref 10–20)
CALCIUM SERPL-MCNC: 9.4 MG/DL — SIGNIFICANT CHANGE UP (ref 8.4–10.5)
CHLORIDE SERPL-SCNC: 103 MMOL/L — SIGNIFICANT CHANGE UP (ref 98–110)
CO2 SERPL-SCNC: 20 MMOL/L — SIGNIFICANT CHANGE UP (ref 17–32)
CREAT SERPL-MCNC: 1.4 MG/DL — SIGNIFICANT CHANGE UP (ref 0.7–1.5)
EGFR: 54 ML/MIN/1.73M2 — LOW
EGFR: 54 ML/MIN/1.73M2 — LOW
EOSINOPHIL # BLD AUTO: 0.21 K/UL — SIGNIFICANT CHANGE UP (ref 0–0.7)
EOSINOPHIL NFR BLD AUTO: 1 % — SIGNIFICANT CHANGE UP (ref 0–8)
ETHANOL SERPL-MCNC: <10 MG/DL — SIGNIFICANT CHANGE UP
GLUCOSE BLDC GLUCOMTR-MCNC: 229 MG/DL — HIGH (ref 70–99)
GLUCOSE BLDC GLUCOMTR-MCNC: 250 MG/DL — HIGH (ref 70–99)
GLUCOSE BLDC GLUCOMTR-MCNC: 255 MG/DL — HIGH (ref 70–99)
GLUCOSE BLDC GLUCOMTR-MCNC: >600 MG/DL — CRITICAL HIGH (ref 70–99)
GLUCOSE SERPL-MCNC: 110 MG/DL — HIGH (ref 70–99)
HCT VFR BLD CALC: 42.4 % — SIGNIFICANT CHANGE UP (ref 42–52)
HGB BLD-MCNC: 13.4 G/DL — LOW (ref 14–18)
IMM GRANULOCYTES NFR BLD AUTO: 0.5 % — HIGH (ref 0.1–0.3)
LYMPHOCYTES # BLD AUTO: 1.71 K/UL — SIGNIFICANT CHANGE UP (ref 1.2–3.4)
LYMPHOCYTES # BLD AUTO: 8.5 % — LOW (ref 20.5–51.1)
MAGNESIUM SERPL-MCNC: 2 MG/DL — SIGNIFICANT CHANGE UP (ref 1.8–2.4)
MCHC RBC-ENTMCNC: 28.2 PG — SIGNIFICANT CHANGE UP (ref 27–31)
MCHC RBC-ENTMCNC: 31.6 G/DL — LOW (ref 32–37)
MCV RBC AUTO: 89.3 FL — SIGNIFICANT CHANGE UP (ref 80–94)
MONOCYTES # BLD AUTO: 1.15 K/UL — HIGH (ref 0.1–0.6)
MONOCYTES NFR BLD AUTO: 5.7 % — SIGNIFICANT CHANGE UP (ref 1.7–9.3)
NEUTROPHILS # BLD AUTO: 16.85 K/UL — HIGH (ref 1.4–6.5)
NEUTROPHILS NFR BLD AUTO: 83.6 % — HIGH (ref 42.2–75.2)
NRBC BLD AUTO-RTO: 0 /100 WBCS — SIGNIFICANT CHANGE UP (ref 0–0)
NT-PROBNP SERPL-SCNC: 8243 PG/ML — HIGH (ref 0–300)
PLATELET # BLD AUTO: 424 K/UL — HIGH (ref 130–400)
PMV BLD: 10 FL — SIGNIFICANT CHANGE UP (ref 7.4–10.4)
POTASSIUM SERPL-MCNC: 4.6 MMOL/L — SIGNIFICANT CHANGE UP (ref 3.5–5)
POTASSIUM SERPL-SCNC: 4.6 MMOL/L — SIGNIFICANT CHANGE UP (ref 3.5–5)
PROT SERPL-MCNC: 6.8 G/DL — SIGNIFICANT CHANGE UP (ref 6–8)
RBC # BLD: 4.75 M/UL — SIGNIFICANT CHANGE UP (ref 4.7–6.1)
RBC # FLD: 14.6 % — HIGH (ref 11.5–14.5)
SODIUM SERPL-SCNC: 138 MMOL/L — SIGNIFICANT CHANGE UP (ref 135–146)
TROPONIN SAMPLING TIME: SIGNIFICANT CHANGE UP
TROPONIN T, HIGH SENSITIVITY RESULT: 62 NG/L — CRITICAL HIGH (ref 6–21)
TROPONIN T, HIGH SENSITIVITY RESULT: 72 NG/L — CRITICAL HIGH (ref 6–21)
WBC # BLD: 20.16 K/UL — HIGH (ref 4.8–10.8)
WBC # FLD AUTO: 20.16 K/UL — HIGH (ref 4.8–10.8)

## 2025-03-28 PROCEDURE — 93005 ELECTROCARDIOGRAM TRACING: CPT

## 2025-03-28 PROCEDURE — 84100 ASSAY OF PHOSPHORUS: CPT

## 2025-03-28 PROCEDURE — 80053 COMPREHEN METABOLIC PANEL: CPT

## 2025-03-28 PROCEDURE — 83036 HEMOGLOBIN GLYCOSYLATED A1C: CPT

## 2025-03-28 PROCEDURE — 36415 COLL VENOUS BLD VENIPUNCTURE: CPT

## 2025-03-28 PROCEDURE — 82746 ASSAY OF FOLIC ACID SERUM: CPT

## 2025-03-28 PROCEDURE — 84145 PROCALCITONIN (PCT): CPT

## 2025-03-28 PROCEDURE — 99223 1ST HOSP IP/OBS HIGH 75: CPT

## 2025-03-28 PROCEDURE — 97110 THERAPEUTIC EXERCISES: CPT | Mod: GP

## 2025-03-28 PROCEDURE — 80048 BASIC METABOLIC PNL TOTAL CA: CPT

## 2025-03-28 PROCEDURE — 80307 DRUG TEST PRSMV CHEM ANLYZR: CPT

## 2025-03-28 PROCEDURE — 73562 X-RAY EXAM OF KNEE 3: CPT | Mod: LT

## 2025-03-28 PROCEDURE — 83735 ASSAY OF MAGNESIUM: CPT

## 2025-03-28 PROCEDURE — 85027 COMPLETE CBC AUTOMATED: CPT

## 2025-03-28 PROCEDURE — 85025 COMPLETE CBC W/AUTO DIFF WBC: CPT

## 2025-03-28 PROCEDURE — 94760 N-INVAS EAR/PLS OXIMETRY 1: CPT

## 2025-03-28 PROCEDURE — 97116 GAIT TRAINING THERAPY: CPT | Mod: GP

## 2025-03-28 PROCEDURE — 99285 EMERGENCY DEPT VISIT HI MDM: CPT

## 2025-03-28 PROCEDURE — 87040 BLOOD CULTURE FOR BACTERIA: CPT

## 2025-03-28 PROCEDURE — 71045 X-RAY EXAM CHEST 1 VIEW: CPT | Mod: 26

## 2025-03-28 PROCEDURE — 84484 ASSAY OF TROPONIN QUANT: CPT

## 2025-03-28 PROCEDURE — 73562 X-RAY EXAM OF KNEE 3: CPT | Mod: 26,LT

## 2025-03-28 PROCEDURE — 71250 CT THORAX DX C-: CPT | Mod: 26

## 2025-03-28 PROCEDURE — 82607 VITAMIN B-12: CPT

## 2025-03-28 PROCEDURE — 82962 GLUCOSE BLOOD TEST: CPT

## 2025-03-28 PROCEDURE — 92610 EVALUATE SWALLOWING FUNCTION: CPT | Mod: GN

## 2025-03-28 PROCEDURE — 71250 CT THORAX DX C-: CPT | Mod: MC

## 2025-03-28 PROCEDURE — 97162 PT EVAL MOD COMPLEX 30 MIN: CPT | Mod: GP

## 2025-03-28 RX ORDER — FUROSEMIDE 10 MG/ML
40 INJECTION INTRAMUSCULAR; INTRAVENOUS DAILY
Refills: 0 | Status: DISCONTINUED | OUTPATIENT
Start: 2025-03-29 | End: 2025-04-02

## 2025-03-28 RX ORDER — DEXTROSE 50 % IN WATER 50 %
25 SYRINGE (ML) INTRAVENOUS ONCE
Refills: 0 | Status: DISCONTINUED | OUTPATIENT
Start: 2025-03-28 | End: 2025-04-02

## 2025-03-28 RX ORDER — MELATONIN 5 MG
10 TABLET ORAL AT BEDTIME
Refills: 0 | Status: DISCONTINUED | OUTPATIENT
Start: 2025-03-28 | End: 2025-04-02

## 2025-03-28 RX ORDER — AMIODARONE HYDROCHLORIDE 50 MG/ML
200 INJECTION, SOLUTION INTRAVENOUS DAILY
Refills: 0 | Status: DISCONTINUED | OUTPATIENT
Start: 2025-03-28 | End: 2025-04-02

## 2025-03-28 RX ORDER — INSULIN GLARGINE-YFGN 100 [IU]/ML
25 INJECTION, SOLUTION SUBCUTANEOUS AT BEDTIME
Refills: 0 | Status: DISCONTINUED | OUTPATIENT
Start: 2025-03-28 | End: 2025-04-02

## 2025-03-28 RX ORDER — METOPROLOL SUCCINATE 50 MG/1
50 TABLET, EXTENDED RELEASE ORAL DAILY
Refills: 0 | Status: DISCONTINUED | OUTPATIENT
Start: 2025-03-28 | End: 2025-04-02

## 2025-03-28 RX ORDER — INSULIN LISPRO 100 U/ML
INJECTION, SOLUTION INTRAVENOUS; SUBCUTANEOUS
Refills: 0 | Status: DISCONTINUED | OUTPATIENT
Start: 2025-03-28 | End: 2025-03-29

## 2025-03-28 RX ORDER — ATORVASTATIN CALCIUM 80 MG/1
40 TABLET, FILM COATED ORAL AT BEDTIME
Refills: 0 | Status: DISCONTINUED | OUTPATIENT
Start: 2025-03-28 | End: 2025-04-02

## 2025-03-28 RX ORDER — CEFTRIAXONE 500 MG/1
1000 INJECTION, POWDER, FOR SOLUTION INTRAMUSCULAR; INTRAVENOUS EVERY 24 HOURS
Refills: 0 | Status: DISCONTINUED | OUTPATIENT
Start: 2025-03-29 | End: 2025-04-01

## 2025-03-28 RX ORDER — SODIUM CHLORIDE 9 G/1000ML
1000 INJECTION, SOLUTION INTRAVENOUS
Refills: 0 | Status: DISCONTINUED | OUTPATIENT
Start: 2025-03-28 | End: 2025-04-02

## 2025-03-28 RX ORDER — DEXTROSE 50 % IN WATER 50 %
15 SYRINGE (ML) INTRAVENOUS ONCE
Refills: 0 | Status: DISCONTINUED | OUTPATIENT
Start: 2025-03-28 | End: 2025-04-02

## 2025-03-28 RX ORDER — SACUBITRIL AND VALSARTAN 6; 6 MG/1; MG/1
1 PELLET ORAL
Refills: 0 | Status: DISCONTINUED | OUTPATIENT
Start: 2025-03-28 | End: 2025-04-02

## 2025-03-28 RX ORDER — ACETAMINOPHEN 500 MG/5ML
650 LIQUID (ML) ORAL EVERY 6 HOURS
Refills: 0 | Status: DISCONTINUED | OUTPATIENT
Start: 2025-03-28 | End: 2025-04-02

## 2025-03-28 RX ORDER — FUROSEMIDE 10 MG/ML
40 INJECTION INTRAMUSCULAR; INTRAVENOUS ONCE
Refills: 0 | Status: COMPLETED | OUTPATIENT
Start: 2025-03-28 | End: 2025-03-28

## 2025-03-28 RX ORDER — LORAZEPAM 4 MG/ML
1 VIAL (ML) INJECTION ONCE
Refills: 0 | Status: DISCONTINUED | OUTPATIENT
Start: 2025-03-28 | End: 2025-03-28

## 2025-03-28 RX ORDER — APIXABAN 2.5 MG/1
5 TABLET, FILM COATED ORAL EVERY 12 HOURS
Refills: 0 | Status: DISCONTINUED | OUTPATIENT
Start: 2025-03-28 | End: 2025-04-02

## 2025-03-28 RX ORDER — NICOTINE POLACRILEX 4 MG/1
1 GUM, CHEWING ORAL DAILY
Refills: 0 | Status: DISCONTINUED | OUTPATIENT
Start: 2025-03-28 | End: 2025-04-02

## 2025-03-28 RX ORDER — CEFTRIAXONE 500 MG/1
1000 INJECTION, POWDER, FOR SOLUTION INTRAMUSCULAR; INTRAVENOUS ONCE
Refills: 0 | Status: COMPLETED | OUTPATIENT
Start: 2025-03-28 | End: 2025-03-28

## 2025-03-28 RX ORDER — INSULIN LISPRO 100 U/ML
3 INJECTION, SOLUTION INTRAVENOUS; SUBCUTANEOUS
Refills: 0 | Status: DISCONTINUED | OUTPATIENT
Start: 2025-03-28 | End: 2025-03-29

## 2025-03-28 RX ORDER — EZETIMIBE 10 MG/1
10 TABLET ORAL DAILY
Refills: 0 | Status: DISCONTINUED | OUTPATIENT
Start: 2025-03-28 | End: 2025-04-02

## 2025-03-28 RX ORDER — FENOFIBRATE 160 MG/1
145 TABLET ORAL DAILY
Refills: 0 | Status: DISCONTINUED | OUTPATIENT
Start: 2025-03-28 | End: 2025-04-02

## 2025-03-28 RX ORDER — CEFTRIAXONE 500 MG/1
INJECTION, POWDER, FOR SOLUTION INTRAMUSCULAR; INTRAVENOUS
Refills: 0 | Status: DISCONTINUED | OUTPATIENT
Start: 2025-03-28 | End: 2025-04-01

## 2025-03-28 RX ORDER — DEXTROSE 50 % IN WATER 50 %
12.5 SYRINGE (ML) INTRAVENOUS ONCE
Refills: 0 | Status: DISCONTINUED | OUTPATIENT
Start: 2025-03-28 | End: 2025-04-02

## 2025-03-28 RX ORDER — GLUCAGON 3 MG/1
1 POWDER NASAL ONCE
Refills: 0 | Status: DISCONTINUED | OUTPATIENT
Start: 2025-03-28 | End: 2025-04-02

## 2025-03-28 RX ORDER — CLOPIDOGREL BISULFATE 75 MG/1
75 TABLET, FILM COATED ORAL DAILY
Refills: 0 | Status: DISCONTINUED | OUTPATIENT
Start: 2025-03-28 | End: 2025-04-02

## 2025-03-28 RX ADMIN — FUROSEMIDE 40 MILLIGRAM(S): 10 INJECTION INTRAMUSCULAR; INTRAVENOUS at 06:47

## 2025-03-28 RX ADMIN — Medication 10 MILLIGRAM(S): at 21:13

## 2025-03-28 RX ADMIN — ATORVASTATIN CALCIUM 40 MILLIGRAM(S): 80 TABLET, FILM COATED ORAL at 21:13

## 2025-03-28 RX ADMIN — CEFTRIAXONE 100 MILLIGRAM(S): 500 INJECTION, POWDER, FOR SOLUTION INTRAMUSCULAR; INTRAVENOUS at 17:57

## 2025-03-28 RX ADMIN — NICOTINE POLACRILEX 1 PATCH: 4 GUM, CHEWING ORAL at 14:27

## 2025-03-28 RX ADMIN — APIXABAN 5 MILLIGRAM(S): 2.5 TABLET, FILM COATED ORAL at 17:57

## 2025-03-28 RX ADMIN — INSULIN LISPRO 2: 100 INJECTION, SOLUTION INTRAVENOUS; SUBCUTANEOUS at 17:58

## 2025-03-28 RX ADMIN — NICOTINE POLACRILEX 1 PATCH: 4 GUM, CHEWING ORAL at 19:26

## 2025-03-28 RX ADMIN — EZETIMIBE 10 MILLIGRAM(S): 10 TABLET ORAL at 14:27

## 2025-03-28 RX ADMIN — INSULIN LISPRO 2: 100 INJECTION, SOLUTION INTRAVENOUS; SUBCUTANEOUS at 12:16

## 2025-03-28 RX ADMIN — METOPROLOL SUCCINATE 50 MILLIGRAM(S): 50 TABLET, EXTENDED RELEASE ORAL at 14:26

## 2025-03-28 RX ADMIN — Medication 20 MILLIGRAM(S): at 11:59

## 2025-03-28 RX ADMIN — Medication 1 MILLIGRAM(S): at 22:31

## 2025-03-28 RX ADMIN — INSULIN LISPRO 3 UNIT(S): 100 INJECTION, SOLUTION INTRAVENOUS; SUBCUTANEOUS at 17:58

## 2025-03-28 RX ADMIN — CLOPIDOGREL BISULFATE 75 MILLIGRAM(S): 75 TABLET, FILM COATED ORAL at 11:59

## 2025-03-28 RX ADMIN — FENOFIBRATE 145 MILLIGRAM(S): 160 TABLET ORAL at 14:26

## 2025-03-28 RX ADMIN — SACUBITRIL AND VALSARTAN 1 TABLET(S): 6; 6 PELLET ORAL at 17:57

## 2025-03-28 NOTE — ED PROVIDER NOTE - PHYSICAL EXAMINATION
VITAL SIGNS: I have reviewed nursing notes and confirm.  CONSTITUTIONAL: Well-developed; well-nourished; chronically ill appearing  SKIN: Skin exam is warm and dry, no acute rash.  HEAD: Normocephalic; atraumatic.  EYES: PERRL, EOM intact; conjunctiva and sclera clear.  ENT: No nasal discharge; airway clear.  CARD: S1, S2 normal; no murmurs, gallops, or rubs. Regular rate and rhythm.  RESP: No wheezes, rales or rhonchi, slightly tachypneic.  ABD: Normal bowel sounds; soft; non-distended; non-tender  EXT: Normal ROM. No pitting edema  NEURO: Alert, oriented. Grossly unremarkable. No focal deficits.  PSYCH: Cooperative, appropriate.

## 2025-03-28 NOTE — ED PROVIDER NOTE - OBJECTIVE STATEMENT
70-year-old male history of CHF, AICD, AVR, hypertension, paroxysmal A-fib status post ablation, CAD, diabetes, hyperlipidemia, COPD not on home O2 here for assessment of dyspnea --patient has chronic dyspnea, not approved for home O2 due to continued smoking, was worse tonight, improved en route.    Per EMS O2 87% at home, was on O2 en route, in ED 98% on RA.    Patient denies cough, chest pain.

## 2025-03-28 NOTE — PATIENT PROFILE ADULT - FALL HARM RISK - RISK INTERVENTIONS
Assistance OOB with selected safe patient handling equipment/Assistance with ambulation/Communicate Fall Risk and Risk Factors to all staff, patient, and family/Discuss with provider need for PT consult/Monitor gait and stability/Provide patient with walking aids - walker, cane, crutches/Reinforce activity limits and safety measures with patient and family/Use of alarms - bed, chair and/or voice tab/Visual Cue: Yellow wristband/Bed in lowest position, wheels locked, appropriate side rails in place/Call bell, personal items and telephone in reach/Instruct patient to call for assistance before getting out of bed or chair/Non-slip footwear when patient is out of bed/[place] to call system/Physically safe environment - no spills, clutter or unnecessary equipment/Purposeful Proactive Rounding/Room/bathroom lighting operational, light cord in reach

## 2025-03-28 NOTE — CONSULT NOTE ADULT - NS ATTEND AMEND GEN_ALL_CORE FT
The patient was seen, examined and chart reviewed and his history was obtained although limited due to the patient being a poor historian. Agree to increase his diuresis with iv Furosemide 40 mg Q12h and increasing his entresto dosage as noted above. Fluid restriction. Patient appears comfortable and placed on a one to one sitter due to suicide ideation. Continue present therapy. Once his clinical status improves may be transferred to Psych unit for additional monitoring and therapy.

## 2025-03-28 NOTE — PHYSICAL THERAPY INITIAL EVALUATION ADULT - LIVES WITH, PROFILE
in a private home with ~ 5 RAMESH with R handrail going up and 15 steps inside with L handrail going up./children/spouse

## 2025-03-28 NOTE — H&P ADULT - HISTORY OF PRESENT ILLNESS
A 69 y/o male with pmhx of HFrEF 43 % , AICD, AVR, hypertension, paroxysmal A-fib status post ablation 1/14/24, CAD s/p PCI , diabetes, hyperlipidemia, COPD not on home O2 , current smoker, frequent  falls  here for assessment of dyspnea. Pt reports shortness of breath x 1 week. Pt reports shortness of breath with lying down and within the house. Pt reports compliant with torsemide. Pt reports LE edema. Pt denies cough or chest pain. Pt reports fell 1 x week ago , hit his L knee has pain with ambulation. Pt poor historian. PT does not know his cardiologist. Pt reports meds as per EMR as discharged recently no changes. Pt denies fever, chills, urinary symptoms, diarrhea.   [facility] O2 87% at home, was on O2 en route, in ED 98% on RA.

## 2025-03-28 NOTE — H&P ADULT - NS ATTEND AMEND GEN_ALL_CORE FT
pt seen and examined on day of admission    discussed at bedside     changes to plan made above as necessary   plan of care discussed with support staff  Appreciate PA's orders and documentation

## 2025-03-28 NOTE — CHART NOTE - NSCHARTNOTEFT_GEN_A_CORE
Patient very agitated, wants to leave hospital. Danger to himself. BH note read, will try dose of IM Ativan

## 2025-03-28 NOTE — CHART NOTE - NSCHARTNOTEFT_GEN_A_CORE
pt wants to leave [facility]   expressed Suicidal ideation   will place on constant observation , RN aware   pt seen with attending pt wants to leave [facility]   expressed Suicidal ideation   will place on constant observation , RN aware   spoke with psychiatry Dr. Mcelroy will sign out to next team   qtc [id number] , recommended benzo for agitation   pt seen with attending

## 2025-03-28 NOTE — ED ADULT NURSE NOTE - NSFALLHARMRISKINTERV_ED_ALL_ED

## 2025-03-28 NOTE — PHYSICAL THERAPY INITIAL EVALUATION ADULT - GENERAL OBSERVATIONS, REHAB EVAL
18:45-19:10. Chart reviewed; confirmed with RN to see the pt for PT. Pt ready for PT; received in bed with no complain of pain and in NAD. +hep lock, +tele, +O2 via NC at 2 L. Agreeable for PT evaluation.

## 2025-03-28 NOTE — ED PROVIDER NOTE - WET READ LAUNCH FT
There are no Wet Read(s) to document.
CONSTITUTIONAL: no fever, no chills   EYES: no visual changes, no eye pain   ENMT: no nasal congestion, no throat pain  CARDIOVASCULAR: no chest pain, no edema, no palpitations   RESPIRATORY: no shortness of breath, no cough   GASTROINTESTINAL: no abdominal pain, no nausea, no vomiting, no diarrhea, no constipation   GENITOURINARY: no dysuria, no frequency  MUSCULOSKELETAL: no joint pains, no myalgias, no back pain   SKIN: no rashes  NEUROLOGICAL: no weakness, +headache, no dizziness, no slurred speech, no syncope   PSYCHIATRIC: no known mental health illness   HEME/LYMPH: no lymphadenopathy      All other ROS negative except as per HPI

## 2025-03-28 NOTE — H&P ADULT - ASSESSMENT
A 69 y/o male with pmhx of HFrEF 43 % , AICD, AVR, hypertension, paroxysmal A-fib status post ablation 1/14/24, CAD s/p PCI , diabetes, hyperlipidemia, COPD not on home O2 , current smoker, frequent  falls  here for assessment of dyspnea.    #acute on chronic CHF exacerbation   # AICD, AVR,  #CAD s/p PCI ,   ECHO 1/15/2025 EF 43 %.   -pro BNP 8 K (on 01/2025 16K )   -s/p 40 IV lasix in ED , cw same dose   -strict I and O   -daily weight   -cardiology consult   -follow up cxr  -check procalcitonin     #elevated troponin likely due to demand ischemia  -trop 72 >62  -cardiac monitor     # Mechanical Fall 1 x week ago   # L knee pain   #hx recurrent fall   -x-ray L knee   -fall precaution   -PT consult       #Hx afib/flutter with recent ablation on 1/15/2025  -cw ELIQUIS 5 BID    #Hx COPD  not in exacerbation      #Hx CAD, CVA, HLD  continue home meds - statin , Plavix,  metoprolol     #hx DM   -hold PO meds  -ISS   -monitor FS     #current smoker  -reports quit 3 days ago   -nicotine patch   -continuing smoking cessation advised     DVT prophylaxis - Eliquis   CODE status FULL        A 69 y/o male with pmhx of HFrEF 43 % , AICD, AVR, hypertension, paroxysmal A-fib status post ablation 1/14/24, CAD s/p PCI , diabetes, hyperlipidemia, COPD not on home O2 , current smoker, frequent  falls  here for assessment of dyspnea.    #acute on chronic CHF exacerbation   # AICD, AVR,  #CAD s/p PCI ,   ECHO 1/15/2025 EF 43 %.   -pro BNP 8 K (on 01/2025 16K )   -s/p 40 IV lasix in ED , cw same dose   -strict I and O   -daily weight   -cardiology consult   -follow up cxr  -check procalcitonin     #elevated troponin likely due to demand ischemia  -trop 72 >62  -cardiac monitor     # Mechanical Fall 1 x week ago   # L knee pain   #hx recurrent fall   -x-ray L knee   -fall precaution   -PT consult     #hx CKD   cr 1.4  (baseline above 1.6)  monitor cr level  avoid nephrotoxic meds     #Hx afib/flutter with recent ablation on 1/15/2025  -cw ELIQUIS 5 BID    #Hx COPD  not in exacerbation      #Hx CAD, CVA, HLD  continue home meds - statin , Plavix,  metoprolol     #hx DM   -hold PO meds  -ISS   -monitor FS     #current smoker  -reports quit 3 days ago   -nicotine patch   -continuing smoking cessation advised     DVT prophylaxis - Eliquis   CODE status FULL        A 69 y/o male with pmhx of HFrEF 43 % , AICD, AVR, hypertension, paroxysmal A-fib status post ablation 1/14/24, CAD s/p PCI , diabetes, hyperlipidemia, COPD not on home O2 , current smoker, frequent  falls  here for assessment of dyspnea.    #acute on chronic CHF exacerbation   # AICD, AVR,  #CAD s/p PCI ,   ECHO 1/15/2025 EF 43 %.   -pro BNP 8 K (on 01/2025 16K )   -s/p 40 IV lasix in ED , cw same dose   -strict I and O   -daily weight   -cardiology consult   -follow up cxr  -check procalcitonin     #leukocytosis   -follow up cxr   -trend wbc   -monitor for fever   -follow up procalcitonin     #elevated troponin likely due to demand ischemia  -trop 72 >62  -cardiac monitor     # Mechanical Fall 1 x week ago   # L knee pain   #hx recurrent fall   -x-ray L knee   -fall precaution   -PT consult     #hx CKD   cr 1.4  (baseline above 1.6)  monitor cr level  avoid nephrotoxic meds     #Hx afib/flutter with recent ablation on 1/15/2025  -cw ELIQUIS 5 BID    #Hx COPD  not in exacerbation      #Hx CAD, CVA, HLD  continue home meds - statin , Plavix,  metoprolol     #hx DM   -hold PO meds  -ISS   -monitor FS     #current smoker  -reports quit 3 days ago   -nicotine patch   -continuing smoking cessation advised     DVT prophylaxis - Eliquis   CODE status FULL        A 69 y/o male with pmhx of HFrEF 43 % , AICD, AVR, hypertension, paroxysmal A-fib status post ablation 1/14/24, CAD s/p PCI , diabetes, hyperlipidemia, COPD not on home O2 , current smoker, frequent  falls  here for assessment of dyspnea.    #acute on chronic CHF exacerbation   # AICD, AVR,  #CAD s/p PCI   #Prolonged qtc  ECHO 1/15/2025 EF 43 %.   -pro BNP 8 K (on 01/2025 16K )   -s/p 40 IV lasix in ED , cw same dose   -strict I and O   -daily weight   -cardiology consult   -follow up cxr  -check procalcitonin     #leukocytosis   -follow up cxr   -trend wbc   -monitor for fever   -follow up procalcitonin     #elevated troponin likely due to demand ischemia  -trop 72 >62  -cardiac monitor     # Mechanical Fall 1 x week ago   # L knee pain   #hx recurrent fall   -x-ray L knee   -fall precaution   -PT consult     #hx CKD   cr 1.4  (baseline above 1.6)  monitor cr level  avoid nephrotoxic meds     #Hx afib/flutter with recent ablation on 1/15/2025  -cw ELIQUIS 5 BID    #Hx COPD  not in exacerbation      #Hx CAD, CVA, HLD  continue home meds - statin , Plavix,  metoprolol     #hx DM   -hold PO meds  -ISS   -monitor FS     #current smoker  -reports quit 3 days ago   -nicotine patch   -continuing smoking cessation advised     DVT prophylaxis - Eliquis   CODE status FULL        A 71 y/o male with pmhx of HFrEF 43 % , AICD, AVR, hypertension, paroxysmal A-fib status post ablation 1/14/24, CAD s/p PCI , diabetes, hyperlipidemia, COPD not on home O2 , current smoker, frequent  falls  here for assessment of dyspnea.    #acute on chronic CHF exacerbation   # AICD, AVR,  #CAD s/p PCI   #Prolonged qtc  ECHO 1/15/2025 EF 43 %.   -pro BNP 8 K (on 01/2025 16K )   -s/p 40 IV lasix in ED , cw same dose   -strict I and O   -daily weight   -cardiology consult   -follow up cxr  -check procalcitonin     #leukocytosis   -follow up cxr   -CT chest non contrast   -trend wbc   -monitor for fever   -follow up procalcitonin   -Bcx   -UA    #elevated troponin likely due to demand ischemia  -trop 72 >62  -cardiac monitor     # Mechanical Fall 1 x week ago   # L knee pain   #hx recurrent fall   -x-ray L knee   -fall precaution   -PT consult     #hx CKD   cr 1.4  (baseline above 1.6)  monitor cr level  avoid nephrotoxic meds     #Hx afib/flutter with recent ablation on 1/15/2025  -cw ELIQUIS 5 BID    #Hx COPD  not in exacerbation      #Hx CAD, CVA, HLD  continue home meds - statin , Plavix,  metoprolol     #hx DM   -hold PO meds  -ISS   -monitor FS     #current smoker  -reports quit 3 days ago   -nicotine patch   -continuing smoking cessation advised     DVT prophylaxis - Eliquis   CODE status FULL        A 71 y/o male with pmhx of HFrEF 43 % , AICD, AVR, hypertension, paroxysmal A-fib status post ablation 1/14/24, CAD s/p PCI , diabetes, hyperlipidemia, COPD not on home O2 , current smoker, frequent  falls  here for assessment of dyspnea.    #acute on chronic CHF exacerbation   # AICD, AVR,  #CAD s/p PCI   #Prolonged qtc  ECHO 1/15/2025 EF 43 %.   -pro BNP 8 K (on 01/2025 16K )   -s/p 40 IV lasix in ED , cw same dose   -strict I and O   -daily weight   -cardiology consult   -follow up cxr  -check procalcitonin     #leukocytosis   -follow up cxr   -CT chest non contrast   -trend wbc   -monitor for fever   -follow up procalcitonin   -Bcx   -UA  -will start on Rocephin     #elevated troponin likely due to demand ischemia  -trop 72 >62  -cardiac monitor     # Mechanical Fall 1 x week ago   # L knee pain   #hx recurrent fall   -x-ray L knee   -fall precaution   -PT consult     #hx CKD   cr 1.4  (baseline above 1.6)  monitor cr level  avoid nephrotoxic meds     #Hx afib/flutter with recent ablation on 1/15/2025  -cw ELIQUIS 5 BID    #Hx COPD  not in exacerbation      #Hx CAD, CVA, HLD  continue home meds - statin , Plavix,  metoprolol     #hx DM   -hold PO meds  -ISS   -monitor FS     #current smoker  -reports quit 3 days ago   -nicotine patch   -continuing smoking cessation advised     DVT prophylaxis - Eliquis   CODE status FULL        A 71 y/o male with pmhx of HFrEF 43 % , AICD, AVR, hypertension, paroxysmal A-fib status post ablation 1/14/24, CAD s/p PCI , diabetes, hyperlipidemia, COPD not on home O2 , current smoker, frequent  falls  here for assessment of dyspnea.    # SI  - told pt wanted to AMA and went to examine pt.  Pt off O2 desatting to low 80s  - began to explain risks of AMA when pt said "Good, I hope I drop dead.  I'd rather die than stay here"  - author probed for more information when pt responded "let me be clear, I'd rather die in the street than live in this [explitive]"  - iso desating and SI will get safety tray, 1:1, and BH clearance prior to AMA    #acute on chronic CHF exacerbation   # AICD, AVR,  #CAD s/p PCI   #Prolonged qtc  ECHO 1/15/2025 EF 43 %.   -pro BNP 8 K (on 01/2025 16K )   -s/p 40 IV lasix in ED , cw same dose   -strict I and O   -daily weight   -cardiology consult   -follow up cxr  -check procalcitonin     #leukocytosis   -follow up cxr   -CT chest non contrast   -trend wbc   -monitor for fever   -follow up procalcitonin   -Bcx   -UA  -will start on Rocephin     #elevated troponin likely due to demand ischemia  -trop 72 >62  -cardiac monitor     # Mechanical Fall 1 x week ago   # L knee pain   #hx recurrent fall   -x-ray L knee   -fall precaution   -PT consult     #hx CKD   cr 1.4  (baseline above 1.6)  monitor cr level  avoid nephrotoxic meds     #Hx afib/flutter with recent ablation on 1/15/2025  -cw ELIQUIS 5 BID    #Hx COPD  not in exacerbation      #Hx CAD, CVA, HLD  continue home meds - statin , Plavix,  metoprolol     #hx DM   -hold PO meds  -ISS   -monitor FS     #current smoker  -reports quit 3 days ago   -nicotine patch   -continuing smoking cessation advised     DVT prophylaxis - Eliquis   CODE status FULL

## 2025-03-28 NOTE — H&P ADULT - NSHPLABSRESULTS_GEN_ALL_CORE
13.4   20.16 )-----------( 424      ( 28 Mar 2025 06:08 )             42.4       03-28    138  |  103  |  18  ----------------------------<  110[H]  4.6   |  20  |  1.4    Ca    9.4      28 Mar 2025 06:08  Mg     2.0     03-28    TPro  6.8  /  Alb  3.9  /  TBili  0.4  /  DBili  x   /  AST  26  /  ALT  17  /  AlkPhos  100  03-28          Magnesium: 2.0 mg/dL (03-28-25 @ 06:08)          Urinalysis Basic - ( 28 Mar 2025 06:08 )    Color: x / Appearance: x / SG: x / pH: x  Gluc: 110 mg/dL / Ketone: x  / Bili: x / Urobili: x   Blood: x / Protein: x / Nitrite: x   Leuk Esterase: x / RBC: x / WBC x   Sq Epi: x / Non Sq Epi: x / Bacteria: x      Lactate Trend

## 2025-03-28 NOTE — BH CHART NOTE - NSEVENTNOTEFT_PSY_ALL_CORE
Received call from primary team late afternoon that patient is making statements that he wants to die and wants to drop dead. Making SI statements and wants to leave AMA. Agitated.     I recommended patient is placed on CO given SI statements.     For acute agitation, given last qtc was above 500, I recommended avoiding antipsychotics and can provide benzo PRN as a temporary measure if benefit outweighs risks. Please hold for sedation or resp depression. Will sign out to next team tbs for full assessment.  Psych CL received call from primary team late afternoon that patient is making statements that he wants to die and wants to drop dead. Making SI statements and wants to leave AMA. Agitated.     I recommended patient is placed on CO given SI statements.     For acute agitation, given last qtc was above 500, I recommended avoiding antipsychotics and can provide benzo PRN as a temporary measure if benefit outweighs risks. Please hold for sedation or resp depression. Will sign out to next team tbs for full assessment.

## 2025-03-28 NOTE — H&P ADULT - NSHPPHYSICALEXAM_GEN_ALL_CORE
VITALS:     ICU Vital Signs Last 24 Hrs  T(C): 36.8 (28 Mar 2025 05:58), Max: 36.8 (28 Mar 2025 05:58)  T(F): 98.3 (28 Mar 2025 05:58), Max: 98.3 (28 Mar 2025 05:58)  HR: 73 (28 Mar 2025 06:49) (73 - 77)  BP: 131/73 (28 Mar 2025 06:49) (131/73 - 140/85)  RR: 22 (28 Mar 2025 06:49) (18 - 24)  SpO2: 99% (28 Mar 2025 06:49) (88% - 99%)    O2 Parameters below as of 28 Mar 2025 06:49  Patient On (Oxygen Delivery Method): nasal cannula  O2 Flow (L/min): 2 sat 96 %    GENERAL: NAD, lying in bed comfortably  HEAD:  Atraumatic, Normocephalic  EYES: EOMI, PERRLA, conjunctiva and sclera clear  ENT: Moist mucous membranes  NECK: Supple, No JVD  CHEST/LUNG: Clear to auscultation bilaterally; No rales, rhonchi, wheezing, or rubs. Unlabored respirations, AICD on AMY chest , no tenderness   HEART:   paced, ; No murmurs, rubs, or gallops  ABDOMEN: obese, Soft, Nontender, Nondistended. No hepatomegally  EXTREMITIES:  b/l LE edema   NERVOUS SYSTEM:  Alert & Oriented X3, speech clear. No deficits   MSK: FROM all 4 extremities, full and equal strength, LLE strength 5/5  knee flexion and ankle flexion/extension   SKIN: RLE shin small ulcer no bleeding L knee abrasion

## 2025-03-28 NOTE — ED PROVIDER NOTE - CLINICAL SUMMARY MEDICAL DECISION MAKING FREE TEXT BOX
Patient presents with shortness of breath.  Labs EKG x-ray done.  Found to be fluid overloaded.  Lasix given.  Placed on oxygen.  Breathing improved while in the ED.  Troponin stable.  Patient admitted to medicine for further management. Labs and EKG were ordered and reviewed.  Imaging was ordered and reviewed by me.  Appropriate medications for patient's presenting complaints were ordered and effects were reassessed.  Patient's records (prior hospital, ED visit, and/or nursing home notes if available) were reviewed.  Additional history was obtained from EMS, family, and/or PCP (where available).  Escalation to admission/observation was considered. Patient requires inpatient hospitalization - monitored setting.

## 2025-03-28 NOTE — ED PROVIDER NOTE - PROGRESS NOTE DETAILS
[facility]: o2 sat varying, XR suggestive of fluid overload, will give lasix [facility]: Trop and BNP elevated from baseline Received signout from Dr. Aguillon.  Patient presents with worsening shortness of breath.  Found to be hypoxic at home.  Symptoms improved on oxygen.  1 dose of Lasix given for likely fluid overload.  Patient resting comfortably.  Pending repeat troponin.  Plan for admission.

## 2025-03-29 LAB
ALBUMIN SERPL ELPH-MCNC: 3.5 G/DL — SIGNIFICANT CHANGE UP (ref 3.5–5.2)
ALP SERPL-CCNC: 93 U/L — SIGNIFICANT CHANGE UP (ref 30–115)
ALT FLD-CCNC: 13 U/L — SIGNIFICANT CHANGE UP (ref 0–41)
ANION GAP SERPL CALC-SCNC: 14 MMOL/L — SIGNIFICANT CHANGE UP (ref 7–14)
AST SERPL-CCNC: 14 U/L — SIGNIFICANT CHANGE UP (ref 0–41)
BASOPHILS # BLD AUTO: 0.14 K/UL — SIGNIFICANT CHANGE UP (ref 0–0.2)
BASOPHILS NFR BLD AUTO: 0.9 % — SIGNIFICANT CHANGE UP (ref 0–1)
BILIRUB SERPL-MCNC: 0.6 MG/DL — SIGNIFICANT CHANGE UP (ref 0.2–1.2)
BUN SERPL-MCNC: 20 MG/DL — SIGNIFICANT CHANGE UP (ref 10–20)
CALCIUM SERPL-MCNC: 9.6 MG/DL — SIGNIFICANT CHANGE UP (ref 8.4–10.5)
CHLORIDE SERPL-SCNC: 104 MMOL/L — SIGNIFICANT CHANGE UP (ref 98–110)
CO2 SERPL-SCNC: 20 MMOL/L — SIGNIFICANT CHANGE UP (ref 17–32)
CREAT SERPL-MCNC: 1.3 MG/DL — SIGNIFICANT CHANGE UP (ref 0.7–1.5)
EGFR: 59 ML/MIN/1.73M2 — LOW
EGFR: 59 ML/MIN/1.73M2 — LOW
EOSINOPHIL # BLD AUTO: 0.23 K/UL — SIGNIFICANT CHANGE UP (ref 0–0.7)
EOSINOPHIL NFR BLD AUTO: 1.4 % — SIGNIFICANT CHANGE UP (ref 0–8)
GLUCOSE BLDC GLUCOMTR-MCNC: 215 MG/DL — HIGH (ref 70–99)
GLUCOSE BLDC GLUCOMTR-MCNC: 241 MG/DL — HIGH (ref 70–99)
GLUCOSE BLDC GLUCOMTR-MCNC: 260 MG/DL — HIGH (ref 70–99)
GLUCOSE BLDC GLUCOMTR-MCNC: 265 MG/DL — HIGH (ref 70–99)
GLUCOSE BLDC GLUCOMTR-MCNC: 328 MG/DL — HIGH (ref 70–99)
GLUCOSE SERPL-MCNC: 217 MG/DL — HIGH (ref 70–99)
HCT VFR BLD CALC: 40.1 % — LOW (ref 42–52)
HGB BLD-MCNC: 13 G/DL — LOW (ref 14–18)
IMM GRANULOCYTES NFR BLD AUTO: 0.7 % — HIGH (ref 0.1–0.3)
LYMPHOCYTES # BLD AUTO: 1.62 K/UL — SIGNIFICANT CHANGE UP (ref 1.2–3.4)
LYMPHOCYTES # BLD AUTO: 9.9 % — LOW (ref 20.5–51.1)
MCHC RBC-ENTMCNC: 28.2 PG — SIGNIFICANT CHANGE UP (ref 27–31)
MCHC RBC-ENTMCNC: 32.4 G/DL — SIGNIFICANT CHANGE UP (ref 32–37)
MCV RBC AUTO: 87 FL — SIGNIFICANT CHANGE UP (ref 80–94)
MONOCYTES # BLD AUTO: 1.05 K/UL — HIGH (ref 0.1–0.6)
MONOCYTES NFR BLD AUTO: 6.4 % — SIGNIFICANT CHANGE UP (ref 1.7–9.3)
NEUTROPHILS # BLD AUTO: 13.25 K/UL — HIGH (ref 1.4–6.5)
NEUTROPHILS NFR BLD AUTO: 80.7 % — HIGH (ref 42.2–75.2)
NRBC BLD AUTO-RTO: 0 /100 WBCS — SIGNIFICANT CHANGE UP (ref 0–0)
PLATELET # BLD AUTO: 399 K/UL — SIGNIFICANT CHANGE UP (ref 130–400)
PMV BLD: 10.9 FL — HIGH (ref 7.4–10.4)
POTASSIUM SERPL-MCNC: 4.3 MMOL/L — SIGNIFICANT CHANGE UP (ref 3.5–5)
POTASSIUM SERPL-SCNC: 4.3 MMOL/L — SIGNIFICANT CHANGE UP (ref 3.5–5)
PROCALCITONIN SERPL-MCNC: 0.07 NG/ML — SIGNIFICANT CHANGE UP (ref 0.02–0.1)
PROT SERPL-MCNC: 6.5 G/DL — SIGNIFICANT CHANGE UP (ref 6–8)
RBC # BLD: 4.61 M/UL — LOW (ref 4.7–6.1)
RBC # FLD: 14.6 % — HIGH (ref 11.5–14.5)
SODIUM SERPL-SCNC: 138 MMOL/L — SIGNIFICANT CHANGE UP (ref 135–146)
TROPONIN T, HIGH SENSITIVITY RESULT: 56 NG/L — CRITICAL HIGH (ref 6–21)
WBC # BLD: 16.4 K/UL — HIGH (ref 4.8–10.8)
WBC # FLD AUTO: 16.4 K/UL — HIGH (ref 4.8–10.8)

## 2025-03-29 PROCEDURE — 99233 SBSQ HOSP IP/OBS HIGH 50: CPT

## 2025-03-29 PROCEDURE — 93010 ELECTROCARDIOGRAM REPORT: CPT | Mod: 76

## 2025-03-29 RX ORDER — INSULIN LISPRO 100 U/ML
5 INJECTION, SOLUTION INTRAVENOUS; SUBCUTANEOUS
Refills: 0 | Status: DISCONTINUED | OUTPATIENT
Start: 2025-03-29 | End: 2025-04-02

## 2025-03-29 RX ORDER — FUROSEMIDE 10 MG/ML
40 INJECTION INTRAMUSCULAR; INTRAVENOUS ONCE
Refills: 0 | Status: COMPLETED | OUTPATIENT
Start: 2025-03-29 | End: 2025-03-29

## 2025-03-29 RX ORDER — MAGNESIUM, ALUMINUM HYDROXIDE 200-200 MG
30 TABLET,CHEWABLE ORAL EVERY 6 HOURS
Refills: 0 | Status: DISCONTINUED | OUTPATIENT
Start: 2025-03-29 | End: 2025-04-02

## 2025-03-29 RX ORDER — INSULIN LISPRO 100 U/ML
INJECTION, SOLUTION INTRAVENOUS; SUBCUTANEOUS
Refills: 0 | Status: DISCONTINUED | OUTPATIENT
Start: 2025-03-29 | End: 2025-04-02

## 2025-03-29 RX ORDER — DEXTROMETHORPHAN HBR, GUAIFENESIN 20; 200 MG/10ML; MG/10ML
10 SOLUTION ORAL EVERY 6 HOURS
Refills: 0 | Status: DISCONTINUED | OUTPATIENT
Start: 2025-03-29 | End: 2025-04-02

## 2025-03-29 RX ADMIN — CLOPIDOGREL BISULFATE 75 MILLIGRAM(S): 75 TABLET, FILM COATED ORAL at 15:09

## 2025-03-29 RX ADMIN — FUROSEMIDE 40 MILLIGRAM(S): 10 INJECTION INTRAMUSCULAR; INTRAVENOUS at 15:09

## 2025-03-29 RX ADMIN — INSULIN LISPRO 8: 100 INJECTION, SOLUTION INTRAVENOUS; SUBCUTANEOUS at 17:06

## 2025-03-29 RX ADMIN — Medication 30 MILLILITER(S): at 10:05

## 2025-03-29 RX ADMIN — NICOTINE POLACRILEX 1 PATCH: 4 GUM, CHEWING ORAL at 07:04

## 2025-03-29 RX ADMIN — DEXTROMETHORPHAN HBR, GUAIFENESIN 10 MILLILITER(S): 20; 200 SOLUTION ORAL at 15:10

## 2025-03-29 RX ADMIN — EZETIMIBE 10 MILLIGRAM(S): 10 TABLET ORAL at 15:08

## 2025-03-29 RX ADMIN — NICOTINE POLACRILEX 1 PATCH: 4 GUM, CHEWING ORAL at 14:00

## 2025-03-29 RX ADMIN — ATORVASTATIN CALCIUM 40 MILLIGRAM(S): 80 TABLET, FILM COATED ORAL at 21:20

## 2025-03-29 RX ADMIN — SACUBITRIL AND VALSARTAN 1 TABLET(S): 6; 6 PELLET ORAL at 17:07

## 2025-03-29 RX ADMIN — INSULIN GLARGINE-YFGN 25 UNIT(S): 100 INJECTION, SOLUTION SUBCUTANEOUS at 21:20

## 2025-03-29 RX ADMIN — AMIODARONE HYDROCHLORIDE 200 MILLIGRAM(S): 50 INJECTION, SOLUTION INTRAVENOUS at 06:03

## 2025-03-29 RX ADMIN — Medication 40 MILLIGRAM(S): at 17:07

## 2025-03-29 RX ADMIN — INSULIN LISPRO 2: 100 INJECTION, SOLUTION INTRAVENOUS; SUBCUTANEOUS at 08:21

## 2025-03-29 RX ADMIN — APIXABAN 5 MILLIGRAM(S): 2.5 TABLET, FILM COATED ORAL at 06:03

## 2025-03-29 RX ADMIN — FENOFIBRATE 145 MILLIGRAM(S): 160 TABLET ORAL at 15:07

## 2025-03-29 RX ADMIN — SACUBITRIL AND VALSARTAN 1 TABLET(S): 6; 6 PELLET ORAL at 06:04

## 2025-03-29 RX ADMIN — INSULIN LISPRO 6: 100 INJECTION, SOLUTION INTRAVENOUS; SUBCUTANEOUS at 12:31

## 2025-03-29 RX ADMIN — INSULIN LISPRO 5 UNIT(S): 100 INJECTION, SOLUTION INTRAVENOUS; SUBCUTANEOUS at 12:31

## 2025-03-29 RX ADMIN — INSULIN LISPRO 5 UNIT(S): 100 INJECTION, SOLUTION INTRAVENOUS; SUBCUTANEOUS at 17:05

## 2025-03-29 RX ADMIN — INSULIN LISPRO 3 UNIT(S): 100 INJECTION, SOLUTION INTRAVENOUS; SUBCUTANEOUS at 08:20

## 2025-03-29 RX ADMIN — FUROSEMIDE 40 MILLIGRAM(S): 10 INJECTION INTRAMUSCULAR; INTRAVENOUS at 06:03

## 2025-03-29 RX ADMIN — APIXABAN 5 MILLIGRAM(S): 2.5 TABLET, FILM COATED ORAL at 17:08

## 2025-03-29 RX ADMIN — METOPROLOL SUCCINATE 50 MILLIGRAM(S): 50 TABLET, EXTENDED RELEASE ORAL at 06:03

## 2025-03-29 RX ADMIN — Medication 10 MILLIGRAM(S): at 21:20

## 2025-03-29 RX ADMIN — CEFTRIAXONE 100 MILLIGRAM(S): 500 INJECTION, POWDER, FOR SOLUTION INTRAMUSCULAR; INTRAVENOUS at 17:07

## 2025-03-29 RX ADMIN — Medication 1 APPLICATION(S): at 06:02

## 2025-03-29 NOTE — BH CONSULTATION LIAISON ASSESSMENT NOTE - NSBHCONSULTMEDAGITATION_PSY_A_CORE FT
For agitation not amenable to verbal redirection, if repeat QTc < 500 can give Haldol 2mg PO q8h, with escalation to IM formulation if patient refuses or a danger to himself or others. If QTc > 500, can give depakote 125 mg PO/IV, with a second dose of 250 mg PO/IV at bedtime if agitation continues.

## 2025-03-29 NOTE — BH CONSULTATION LIAISON ASSESSMENT NOTE - NSBHREFERDETAILS_PSY_A_CORE_FT
69yo significant cardiac hx presented to ed sob w/ CHF exacerbation, needs oxygen, wanted to ama, SI on exam

## 2025-03-29 NOTE — BH CONSULTATION LIAISON ASSESSMENT NOTE - NSBHCHARTREVIEWVS_PSY_A_CORE FT
show
Vital Signs Last 24 Hrs  T(C): 36.7 (29 Mar 2025 14:46), Max: 36.9 (29 Mar 2025 04:13)  T(F): 98 (29 Mar 2025 14:46), Max: 98.4 (29 Mar 2025 04:13)  HR: 70 (29 Mar 2025 14:46) (65 - 70)  BP: 147/85 (29 Mar 2025 14:46) (147/85 - 155/87)  BP(mean): --  RR: 18 (29 Mar 2025 14:46) (18 - 23)  SpO2: 95% (29 Mar 2025 14:46) (92% - 97%)    Parameters below as of 28 Mar 2025 20:30  Patient On (Oxygen Delivery Method): nasal cannula  O2 Flow (L/min): 2

## 2025-03-29 NOTE — BH CONSULTATION LIAISON ASSESSMENT NOTE - NSCOMMENTSUICRISKFACT_PSY_ALL_CORE
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

## 2025-03-29 NOTE — BH CONSULTATION LIAISON ASSESSMENT NOTE - NSBHCONSULTRECOMMENDOTHER_PSY_A_CORE FT
Delirium Management:    - Please limit/avoid use of deliriogenic agents when possible (e.g. benzos, anticholinergics, steroids, opiates)    - Please keep pt in bed nearest to window    - Daytime: lights on, curtains open, minimize napping, frequent one-on-one social interactions, minimize overstimulating environment, encourage family/friend visitation, and provide frequent reorientation    - Nighttime: lights off, minimize environmental noise, avoid non-essential interventions overnight (e.g. routine VS, phlebotomy, fingersticks etc)    - Before staff provides any care involving physical contact (e.g. Vital signs, cleaning), first ensure adequate verbal communication has occurred and confirm pt's understanding and willingness of planned physical contact to reduce risk of reactive/responsive aggression.    - Encourage use of corrective devices when indicated (e.g. eyeglasses and/or hearing aids)

## 2025-03-29 NOTE — BH CONSULTATION LIAISON ASSESSMENT NOTE - CURRENT MEDICATION
MEDICATIONS  (STANDING):  aMIOdarone    Tablet 200 milliGRAM(s) Oral daily  apixaban 5 milliGRAM(s) Oral every 12 hours  atorvastatin 40 milliGRAM(s) Oral at bedtime  cefTRIAXone   IVPB      cefTRIAXone   IVPB 1000 milliGRAM(s) IV Intermittent every 24 hours  chlorhexidine 2% Cloths 1 Application(s) Topical <User Schedule>  clopidogrel Tablet 75 milliGRAM(s) Oral daily  dextrose 5%. 1000 milliLiter(s) (50 mL/Hr) IV Continuous <Continuous>  dextrose 5%. 1000 milliLiter(s) (100 mL/Hr) IV Continuous <Continuous>  dextrose 50% Injectable 25 Gram(s) IV Push once  dextrose 50% Injectable 12.5 Gram(s) IV Push once  dextrose 50% Injectable 25 Gram(s) IV Push once  ezetimibe 10 milliGRAM(s) Oral daily  fenofibrate Tablet 145 milliGRAM(s) Oral daily  furosemide   Injectable 40 milliGRAM(s) IV Push daily  glucagon  Injectable 1 milliGRAM(s) IntraMuscular once  insulin glargine Injectable (LANTUS) 25 Unit(s) SubCutaneous at bedtime  insulin lispro (ADMELOG) corrective regimen sliding scale   SubCutaneous three times a day before meals  insulin lispro Injectable (ADMELOG) 5 Unit(s) SubCutaneous three times a day before meals  melatonin 10 milliGRAM(s) Oral at bedtime  metoprolol succinate ER 50 milliGRAM(s) Oral daily  nicotine -  14 mG/24Hr(s) Patch 1 Patch Transdermal daily  pantoprazole    Tablet 40 milliGRAM(s) Oral two times a day  sacubitril 24 mG/valsartan 26 mG 1 Tablet(s) Oral two times a day    MEDICATIONS  (PRN):  acetaminophen     Tablet .. 650 milliGRAM(s) Oral every 6 hours PRN Temp greater or equal to 38C (100.4F), Mild Pain (1 - 3)  aluminum hydroxide/magnesium hydroxide/simethicone Suspension 30 milliLiter(s) Oral every 6 hours PRN Dyspepsia  dextrose Oral Gel 15 Gram(s) Oral once PRN Blood Glucose LESS THAN 70 milliGRAM(s)/deciliter  guaifenesin/dextromethorphan Oral Liquid 10 milliLiter(s) Oral every 6 hours PRN Cough

## 2025-03-29 NOTE — BH CONSULTATION LIAISON ASSESSMENT NOTE - ABORTED (SELF-INTERRUPTED) ATTEMPT:
Pt presents with an unwitnessed fall. Spouse states that he heard her \"yelping\" on the floor in the other room. Pt + for right hip and knee pain. Unknown if the patient hit her head, but spouse denies LOC. Pt + for right hip and knee nearness upon palpation    None known

## 2025-03-29 NOTE — BH CONSULTATION LIAISON ASSESSMENT NOTE - RISK ASSESSMENT
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

## 2025-03-29 NOTE — BH CONSULTATION LIAISON ASSESSMENT NOTE - OTHER
some difficulty talking due to dyspnea "melancholy", denies depression per chart review of discharge summary from Jan 2025, patient has assistance at home from spouse and son

## 2025-03-29 NOTE — BH CONSULTATION LIAISON ASSESSMENT NOTE - NSBHCHARTREVIEWLAB_PSY_A_CORE FT
13.0   16.40 )-----------( 399      ( 29 Mar 2025 06:23 )             40.1   03-29    138  |  104  |  20  ----------------------------<  217[H]  4.3   |  20  |  1.3    Ca    9.6      29 Mar 2025 06:23  Mg     2.0     03-28    TPro  6.5  /  Alb  3.5  /  TBili  0.6  /  DBili  x   /  AST  14  /  ALT  13  /  AlkPhos  93  03-29

## 2025-03-29 NOTE — BH CONSULTATION LIAISON ASSESSMENT NOTE - NSUNABLEASSESSPROTRISKCOMMENT_PSY_ALL_CORE
patient reports that he doesn't want to kill anybody including himself; other protective factors include being domiciled and lack of lethal means

## 2025-03-29 NOTE — BH CONSULTATION LIAISON ASSESSMENT NOTE - SUMMARY
Patient is a 70 y.o.  male domiciled at private residence with past medical hx of HFrEF 43 % , AICD, AVR, hypertension, paroxysmal A-fib status post ablation 1/14/24, CAD s/p PCI , diabetes, hyperlipidemia, COPD not on home O2 , frequent falls; no reported formal past psych hx except some psychotherapy; admitted to medical floor for treatment of dyspnea/CHF exacerbation. Psychiatry consulted for assessment of SI.    At this time patient is not fully oriented, though per discussion with primary team his delirium is much improved today. Though patient's presentation is concerning for chronic MDD and PTSD, he is adamant that he does not want treatment for either due to not wanting to add more medications to the long list of medications he already has to take. He vehemently denies suicidality, explains his prior statement by the sentiment of wanting to be with his loved ones instead of in the hospital even if it means increased risk of death as he is not afraid of dying thought he does not want to. He reports intention to continue his medical treatment as prescribed out of the hospital. Given patient's last QTc > 500, recommend obtaining a f/u EKG in setting of improved hypoxia. May also consider magnesium supplementation for further cardiac stabilization (last level 2.0). If QTc < 500, can give PRN haldol 2 mg q8h for agitation; if QTc > 500, can give depakote 125 mg PO/IV. Avoid benzodiazepines when possible due to risk of paradoxical agitation and delirium.    RECOMMENDATIONS  1. Obtain f/u EKG  2. consider magnesium supplementation for further cardiac stabilization for prolonged QTc  3. Psych will follow for assessment of delirium resolution and further characterization of mood/offer for voluntary psych hospitalization  4. Patient currently not attempting to leave AMA though does demonstrate capacity to do so -- capacity must be re-evaluated if patient attempts to do so again    ***PRNs  For agitation not amenable to verbal redirection, if repeat QTc < 500 can give Haldol 2mg PO q8h, with escalation to IM formulation if patient refuses or a danger to himself or others. If QTc > 500, can give depakote 125 mg PO/IV, with a second dose of 250 mg PO/IV at bedtime if agitation continues.

## 2025-03-29 NOTE — BH CONSULTATION LIAISON ASSESSMENT NOTE - DETAILS
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

## 2025-03-29 NOTE — BH CONSULTATION LIAISON ASSESSMENT NOTE - HPI (INCLUDE ILLNESS QUALITY, SEVERITY, DURATION, TIMING, CONTEXT, MODIFYING FACTORS, ASSOCIATED SIGNS AND SYMPTOMS)
Patient is a 70 y.o.  male domiciled at private residence with past medical hx of HFrEF 43 % , AICD, AVR, hypertension, paroxysmal A-fib status post ablation 1/14/24, CAD s/p PCI , diabetes, hyperlipidemia, COPD not on home O2 , frequent falls; no reported formal past psych hx except some psychotherapy; admitted to medical floor for treatment of dyspnea/CHF exacerbation. Psychiatry consulted for assessment of SI.    On approach, patient is alert, oriented x 3, not oriented fully to time, and cooperative with interview. Patient reports that he's been feeling physically "shitty" but that he's mentally "fine". Patient then reports his mood as melancholy and reports that it has been this way for years. 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 Patient becomes tearful when discussing his prior profession. Patient denies that this indicates depression, says "it only bothers me when I talk about it." He denies nightmares but does report that he wants to keep away from people because he doesn't want to hurt them. 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 He explains his prior statement concerning for SI by explaining that he knows his heart is getting weaker and that there can't be anything done to reverse it and that he'd rather be around his family and friends instead of in the hospital and that he's not afraid of death, though he doesn't want to die. He denies access to firearms. He reports intention to continue taking whatever medications he is discharged with. Patient is adamant about not wanting to take psychiatric medications for his mood or PTSD sxs as he already takes too many medications and it will be difficult for him to remember. He does not want a referral to outpatient psychiatry.

## 2025-03-30 LAB
ANION GAP SERPL CALC-SCNC: 14 MMOL/L — SIGNIFICANT CHANGE UP (ref 7–14)
BUN SERPL-MCNC: 20 MG/DL — SIGNIFICANT CHANGE UP (ref 10–20)
CALCIUM SERPL-MCNC: 9.3 MG/DL — SIGNIFICANT CHANGE UP (ref 8.4–10.5)
CHLORIDE SERPL-SCNC: 102 MMOL/L — SIGNIFICANT CHANGE UP (ref 98–110)
CO2 SERPL-SCNC: 25 MMOL/L — SIGNIFICANT CHANGE UP (ref 17–32)
CREAT SERPL-MCNC: 1.2 MG/DL — SIGNIFICANT CHANGE UP (ref 0.7–1.5)
EGFR: 65 ML/MIN/1.73M2 — SIGNIFICANT CHANGE UP
EGFR: 65 ML/MIN/1.73M2 — SIGNIFICANT CHANGE UP
FOLATE SERPL-MCNC: 17.2 NG/ML — SIGNIFICANT CHANGE UP
GLUCOSE BLDC GLUCOMTR-MCNC: 114 MG/DL — HIGH (ref 70–99)
GLUCOSE BLDC GLUCOMTR-MCNC: 149 MG/DL — HIGH (ref 70–99)
GLUCOSE BLDC GLUCOMTR-MCNC: 154 MG/DL — HIGH (ref 70–99)
GLUCOSE BLDC GLUCOMTR-MCNC: 181 MG/DL — HIGH (ref 70–99)
GLUCOSE SERPL-MCNC: 211 MG/DL — HIGH (ref 70–99)
HCT VFR BLD CALC: 39.9 % — LOW (ref 42–52)
HGB BLD-MCNC: 13 G/DL — LOW (ref 14–18)
MAGNESIUM SERPL-MCNC: 1.7 MG/DL — LOW (ref 1.8–2.4)
MCHC RBC-ENTMCNC: 28 PG — SIGNIFICANT CHANGE UP (ref 27–31)
MCHC RBC-ENTMCNC: 32.6 G/DL — SIGNIFICANT CHANGE UP (ref 32–37)
MCV RBC AUTO: 85.8 FL — SIGNIFICANT CHANGE UP (ref 80–94)
NRBC BLD AUTO-RTO: 0 /100 WBCS — SIGNIFICANT CHANGE UP (ref 0–0)
PHOSPHATE SERPL-MCNC: 2.9 MG/DL — SIGNIFICANT CHANGE UP (ref 2.1–4.9)
PLATELET # BLD AUTO: 374 K/UL — SIGNIFICANT CHANGE UP (ref 130–400)
PMV BLD: 11 FL — HIGH (ref 7.4–10.4)
POTASSIUM SERPL-MCNC: 3.7 MMOL/L — SIGNIFICANT CHANGE UP (ref 3.5–5)
POTASSIUM SERPL-SCNC: 3.7 MMOL/L — SIGNIFICANT CHANGE UP (ref 3.5–5)
RBC # BLD: 4.65 M/UL — LOW (ref 4.7–6.1)
RBC # FLD: 14.3 % — SIGNIFICANT CHANGE UP (ref 11.5–14.5)
SODIUM SERPL-SCNC: 141 MMOL/L — SIGNIFICANT CHANGE UP (ref 135–146)
VIT B12 SERPL-MCNC: 857 PG/ML — SIGNIFICANT CHANGE UP (ref 232–1245)
WBC # BLD: 16.97 K/UL — HIGH (ref 4.8–10.8)
WBC # FLD AUTO: 16.97 K/UL — HIGH (ref 4.8–10.8)

## 2025-03-30 PROCEDURE — 93010 ELECTROCARDIOGRAM REPORT: CPT

## 2025-03-30 PROCEDURE — 99239 HOSP IP/OBS DSCHRG MGMT >30: CPT

## 2025-03-30 RX ORDER — MAGNESIUM SULFATE 500 MG/ML
2 SYRINGE (ML) INJECTION
Refills: 0 | Status: DISCONTINUED | OUTPATIENT
Start: 2025-03-30 | End: 2025-03-30

## 2025-03-30 RX ORDER — MAGNESIUM SULFATE 500 MG/ML
2 SYRINGE (ML) INJECTION
Refills: 0 | Status: COMPLETED | OUTPATIENT
Start: 2025-03-30 | End: 2025-03-30

## 2025-03-30 RX ORDER — LORAZEPAM 4 MG/ML
1 VIAL (ML) INJECTION ONCE
Refills: 0 | Status: DISCONTINUED | OUTPATIENT
Start: 2025-03-30 | End: 2025-03-30

## 2025-03-30 RX ORDER — FUROSEMIDE 10 MG/ML
40 INJECTION INTRAMUSCULAR; INTRAVENOUS EVERY 6 HOURS
Refills: 0 | Status: COMPLETED | OUTPATIENT
Start: 2025-03-30 | End: 2025-03-30

## 2025-03-30 RX ADMIN — Medication 25 GRAM(S): at 08:38

## 2025-03-30 RX ADMIN — CLOPIDOGREL BISULFATE 75 MILLIGRAM(S): 75 TABLET, FILM COATED ORAL at 11:53

## 2025-03-30 RX ADMIN — INSULIN LISPRO 5 UNIT(S): 100 INJECTION, SOLUTION INTRAVENOUS; SUBCUTANEOUS at 17:17

## 2025-03-30 RX ADMIN — Medication 40 MILLIGRAM(S): at 17:20

## 2025-03-30 RX ADMIN — Medication 25 GRAM(S): at 10:42

## 2025-03-30 RX ADMIN — SACUBITRIL AND VALSARTAN 1 TABLET(S): 6; 6 PELLET ORAL at 17:19

## 2025-03-30 RX ADMIN — FENOFIBRATE 145 MILLIGRAM(S): 160 TABLET ORAL at 11:53

## 2025-03-30 RX ADMIN — APIXABAN 5 MILLIGRAM(S): 2.5 TABLET, FILM COATED ORAL at 17:18

## 2025-03-30 RX ADMIN — Medication 10 MILLIGRAM(S): at 21:08

## 2025-03-30 RX ADMIN — ATORVASTATIN CALCIUM 40 MILLIGRAM(S): 80 TABLET, FILM COATED ORAL at 21:08

## 2025-03-30 RX ADMIN — INSULIN LISPRO 2: 100 INJECTION, SOLUTION INTRAVENOUS; SUBCUTANEOUS at 11:54

## 2025-03-30 RX ADMIN — FUROSEMIDE 40 MILLIGRAM(S): 10 INJECTION INTRAMUSCULAR; INTRAVENOUS at 11:52

## 2025-03-30 RX ADMIN — INSULIN LISPRO 5 UNIT(S): 100 INJECTION, SOLUTION INTRAVENOUS; SUBCUTANEOUS at 08:12

## 2025-03-30 RX ADMIN — Medication 1 MILLIGRAM(S): at 01:08

## 2025-03-30 RX ADMIN — Medication 51.25 MILLIGRAM(S): at 05:50

## 2025-03-30 RX ADMIN — Medication 1 APPLICATION(S): at 05:39

## 2025-03-30 RX ADMIN — CEFTRIAXONE 100 MILLIGRAM(S): 500 INJECTION, POWDER, FOR SOLUTION INTRAMUSCULAR; INTRAVENOUS at 17:15

## 2025-03-30 RX ADMIN — FUROSEMIDE 40 MILLIGRAM(S): 10 INJECTION INTRAMUSCULAR; INTRAVENOUS at 17:17

## 2025-03-30 RX ADMIN — INSULIN LISPRO 5 UNIT(S): 100 INJECTION, SOLUTION INTRAVENOUS; SUBCUTANEOUS at 11:54

## 2025-03-30 RX ADMIN — EZETIMIBE 10 MILLIGRAM(S): 10 TABLET ORAL at 11:53

## 2025-03-30 RX ADMIN — INSULIN LISPRO 2: 100 INJECTION, SOLUTION INTRAVENOUS; SUBCUTANEOUS at 08:12

## 2025-03-30 RX ADMIN — INSULIN GLARGINE-YFGN 25 UNIT(S): 100 INJECTION, SOLUTION SUBCUTANEOUS at 21:09

## 2025-03-30 NOTE — SWALLOW BEDSIDE ASSESSMENT ADULT - SWALLOW EVAL: RECOMMENDED FEEDING/EATING TECHNIQUES
position upright (90 degrees)/small sips/bites
oral hygiene/position upright (90 degrees)/small sips/bites

## 2025-03-30 NOTE — CHART NOTE - NSCHARTNOTEFT_GEN_A_CORE
Patients magnesium level found to be 1.7 on morning labs. 2g magnesium IVPB ordered Q2 hours for 2 doses. Will reevaluate magnesium level tomorrow morning.

## 2025-03-30 NOTE — CHART NOTE - NSCHARTNOTEFT_GEN_A_CORE
Pt is very agitated and physically aggressive to staff.  Pt will be placed in 4 point restraints for ONE hour and with VS to be done every THIRTY minutes.  A one time dose of Depakote IVPB was ordered as recommended in the Psychiatry note.  A follow up EKG was also ordered and will be completed once pt calms. Pt is very agitated and physically aggressive to staff.  Pt will be placed in 4 point restraints for ONE hour and with VS to be done every THIRTY minutes.  A one time dose of Depakote IVPB was ordered as recommended in the Psychiatry note.  A follow up EKG was also ordered and will be completed once pt calms.  D/W Dr Lucas.

## 2025-03-30 NOTE — BH CONSULTATION LIAISON PROGRESS NOTE - NSBHASSESSMENTFT_PSY_ALL_CORE
Patient is a 70 y.o.  male domiciled at private residence with past medical hx of HFrEF 43 % , AICD, AVR, hypertension, paroxysmal A-fib status post ablation 1/14/24, CAD s/p PCI , diabetes, hyperlipidemia, COPD not on home O2 , frequent falls; no reported formal past psych hx except some psychotherapy; admitted to medical floor for treatment of dyspnea/CHF exacerbation. Psychiatry consulted for assessment of SI.    At this time patient is not fully oriented, though per discussion with primary team his delirium is much improved today. Though patient's presentation is concerning for chronic MDD and PTSD, he is adamant that he does not want treatment for either due to not wanting to add more medications to the long list of medications he already has to take. He vehemently denies suicidality, explains his prior statement by the sentiment of wanting to be with his loved ones instead of in the hospital even if it means increased risk of death as he is not afraid of dying thought he does not want to. He reports intention to continue his medical treatment as prescribed out of the hospital. Given patient's last QTc > 500, recommend obtaining a f/u EKG in setting of improved hypoxia. May also consider magnesium supplementation for further cardiac stabilization (last level 2.0). If QTc < 500, can give PRN haldol 2 mg q8h for agitation; if QTc > 500, can give depakote 125 mg PO/IV. AVOID BENZODIAZEPINES WHEN POSSIBLE DUE TO RISK OF PARADOXICAL AGITATION AND DELIRIUM.      3/30: patient continues to be delirious with episodes of confusion, lethargy, and agitation. QTC continues to be prolonged.    RECOMMENDATIONS  -If QTc < 500, can give PRN haldol 2 mg q8h for agitation; if QTc > 500, can give depakote 125 mg PO/IV.  -AVOID BENZODIAZEPINES WHEN POSSIBLE DUE TO RISK OF PARADOXICAL AGITATION AND DELIRIUM- please attempt to treat agitation with Depakote before resorting to a benzodiazapine   -Obtain f/u EKG since QTC >500 on last EKG  -consider magnesium supplementation for further cardiac stabilization for prolonged QTc  3. Psych will follow for assessment of delirium resolution and further characterization of mood/offer for voluntary psych hospitalization  4. Patient currently not attempting to leave AMA though does demonstrate capacity to do so -- capacity must be re-evaluated if patient attempts to do so again    ***PRNs  For agitation not amenable to verbal redirection, if repeat QTc < 500 can give Haldol 2mg PO q8h, with escalation to IM formulation if patient refuses or a danger to himself or others. If QTc > 500, can give depakote 125 mg PO/IV, with a second dose of 250 mg PO/IV at bedtime if agitation continues.         Patient is a 70 y.o.  male domiciled at private residence with past medical hx of HFrEF 43 % , AICD, AVR, hypertension, paroxysmal A-fib status post ablation 1/14/24, CAD s/p PCI , diabetes, hyperlipidemia, COPD not on home O2 , frequent falls; no reported formal past psych hx except some psychotherapy; admitted to medical floor for treatment of dyspnea/CHF exacerbation. Psychiatry consulted for assessment of SI.    At this time patient is not fully oriented, though per discussion with primary team his delirium is much improved today. Though patient's presentation is concerning for chronic MDD and PTSD, he is adamant that he does not want treatment for either due to not wanting to add more medications to the long list of medications he already has to take. He vehemently denies suicidality, explains his prior statement by the sentiment of wanting to be with his loved ones instead of in the hospital even if it means increased risk of death as he is not afraid of dying thought he does not want to. He reports intention to continue his medical treatment as prescribed out of the hospital. Given patient's last QTc > 500, recommend obtaining a f/u EKG in setting of improved hypoxia. May also consider magnesium supplementation for further cardiac stabilization (last level 2.0). If QTc < 500, can give PRN haldol 2 mg q8h for agitation; if QTc > 500, can give depakote 125 mg PO/IV. AVOID BENZODIAZEPINES WHEN POSSIBLE DUE TO RISK OF PARADOXICAL AGITATION AND DELIRIUM.      3/30: Patient continues to be delirious with episodes of confusion, lethargy, and agitation. QTC continues to be prolonged.    RECOMMENDATIONS  -If QTc < 500, can give PRN haldol 2 mg q8h for agitation; if QTc > 500, can give depakote 125 mg PO/IV.  -AVOID BENZODIAZEPINES WHEN POSSIBLE DUE TO RISK OF PARADOXICAL AGITATION AND DELIRIUM- please attempt to treat agitation with Depakote before resorting to a benzodiazapine   -Obtain f/u EKG since QTC >500 on last EKG  -consider magnesium supplementation for further cardiac stabilization for prolonged QTc  3. Psych will follow for assessment of delirium resolution and further characterization of mood/offer for voluntary psych hospitalization  4. Patient currently not attempting to leave AMA though does demonstrate capacity to do so -- capacity must be re-evaluated if patient attempts to do so again    ***PRNs  For agitation not amenable to verbal redirection, if repeat QTc < 500 can give Haldol 2mg PO q8h, with escalation to IM formulation if patient refuses or a danger to himself or others. If QTc > 500, can give depakote 125 mg PO/IV, with a second dose of 250 mg PO/IV at bedtime if agitation continues.

## 2025-03-30 NOTE — DISCHARGE NOTE PROVIDER - NSDCCPCAREPLAN_GEN_ALL_CORE_FT
PRINCIPAL DISCHARGE DIAGNOSIS  Diagnosis: SOB (shortness of breath)  Assessment and Plan of Treatment: You presented to the ED with shortness of breath.  You were found to be in volume overload from a heart failure exacerbation.  You responded well to diuretics and your symptoms improved. Please follow up with your cardiologist, take all mediations as prescribed, and adhere to a low salt diet.   Your stay was complicated by episodes at night of severe aggitation.  Consider following up with out patient psychiatry for help managing severe symptoms.   Please follow up with your PCP within two weeks.   If your symptoms return or worsen please return to the hospital.     PRINCIPAL DISCHARGE DIAGNOSIS  Diagnosis: SOB (shortness of breath)  Assessment and Plan of Treatment: You presented to the ED with shortness of breath.  You were found to be in volume overload from a heart failure exacerbation.  You responded well to diuretics and your symptoms improved. Please follow up with your cardiologist, take all mediations as prescribed, and adhere to a low salt diet.   You are being discharged w supplimental oxygen. PLEASE BE AWARE THAT SMOKING w OXYGEN MACHINE TURNED ON IS A FIRE AND EXPLOSION HAZARD. Please do NOT smoke or light a cigarette if your oxygen machine is turned on or near you   Your stay was complicated by episodes at night of severe aggitation.  Consider following up with out patient psychiatry for help managing severe symptoms.   Please follow up with your PCP within two weeks.   If your symptoms return or worsen please return to the hospital.

## 2025-03-30 NOTE — BH CONSULTATION LIAISON PROGRESS NOTE - NSBHFUPINTERVALHXFT_PSY_A_CORE
Patient was seen and evaluated this morning. Patient continues to be delirious having episodes of agitation, confusion, and lethargy. RN reported that patient was agitated overnight and was placed on 4 point restraint. He did not sleep all night. When she arrived in the AM he was confused and mildly agitated but redirectable this morning when she removed the restraints. Because patient did not sleep last night he has been sleeping most of the day. She reports that he is still confused but his confusion has improved. She states that overall his mood has been good, however at times he makes statements c/f depression like "I don't know why im living" and "what is the purpose?".    Patient falls to sleep repeatedly when speaking the the psychiatry team. He has to be aroused every few moment, and falls to sleep mid sentence while speaking. He reports a "better" and "marvelous" mood. He is confused thinking he is in "[facility]" and it is "september, february, 2025". He has difficulty answering questions because he keeps falling to sleep and when rearoused he states "I know I know this I just can't say this". Psych team attempts to speak to patient about OP psychiatric FU and/or OP psychotherapy upon DC, however patient is too sleepy to process this information.    Patient was seen and evaluated this morning. Patient continues to be delirious having episodes of agitation, confusion, and lethargy. RN reported that patient was agitated overnight and was placed on 4 point restraint. He did not sleep all night. When she arrived in the AM he was confused and mildly agitated but redirectable this morning when she removed the restraints. Because patient did not sleep last night he has been sleeping most of the day. She reports that he is still confused but his confusion has improved. She states that overall his mood has been good currently but has made statements in the past, c/f depression like "I don't know why im living" and "what is the purpose?".    Patient falls to sleep repeatedly when speaking the the psychiatry team. He has to be aroused every few moment, and falls to sleep mid sentence while speaking. He reports a "better" and "marvelous" mood. He is confused thinking he is in "[facility]" and it is "september, february, 2025". He has difficulty answering questions because he keeps falling to sleep and when rearoused he states "I know I know this I just can't say this". Patient adamantly denies any SI intent or plan stating "they asked me that yesterday too". Patient states he does not want to start medications. Psych team attempts to speak to patient about OP psychiatric FU and/or OP psychotherapy upon DC, however patient is too sleepy to process this information.

## 2025-03-30 NOTE — SWALLOW BEDSIDE ASSESSMENT ADULT - SLP PERTINENT HISTORY OF CURRENT PROBLEM
A 69 y/o male with pmhx of HFrEF 43 % , AICD, AVR, hypertension, paroxysmal A-fib status post ablation 1/14/24, CAD s/p PCI , diabetes, hyperlipidemia, COPD not on home O2 , current smoker, frequent  falls  here for assessment of dyspnea. Pt reports shortness of breath x 1 week. Pt reports shortness of breath with lying down and within the house. Pt reports compliant with torsemide. Pt reports LE edema. Pt denies cough or chest pain. Pt reports fell 1 x week ago , hit his L knee has pain with ambulation. Pt poor historian. PT does not know his cardiologist. Pt reports meds as per EMR as discharged recently no changes. Pt denies fever, chills, urinary symptoms, diarrhea.
71 y/o male with pmhx of HFrEF 43%, AICD, AVR, hypertension, paroxysmal A-fib status post ablation 1/14/24, CAD s/p PCI , diabetes, hyperlipidemia, COPD (not on home O2), current smoker, frequent  falls, presents for assessment of dyspnea. Pt is being treated for acute on chronic HFmrEF exacerbation, suspected PNA, demand ischemia. CT chest 3/28-> Interval development of bilateral symmetric airspace opacities with crazy paving noted primarily within both upper lobes. Constellation of findings suggests that of congestive failure (correlate with physical findings).

## 2025-03-30 NOTE — SWALLOW BEDSIDE ASSESSMENT ADULT - SLP GENERAL OBSERVATIONS
pt received in bed awake alert +2L NC +1:1 sit at bedside, pt reports difficulty w/ eggs on breakfast tray, otherwise no difficulty swallowing.

## 2025-03-30 NOTE — SWALLOW BEDSIDE ASSESSMENT ADULT - COMMENTS
Known to ST services 1/11/25- recommendations for SBS and thin liquids
Pt known to acute SLP service from previous admissions, most recently January 2025, recs for soft and bite sized, thin liquids.

## 2025-03-30 NOTE — SWALLOW BEDSIDE ASSESSMENT ADULT - SWALLOW EVAL: DIAGNOSIS
+toleration for soft and bite sized, thin liquids w/o overt s/s aspiration/penetration
+ toleration of sbs and thin liquids w/o overt s/s of aspiration/penetration

## 2025-03-30 NOTE — DISCHARGE NOTE PROVIDER - NSDCFUSCHEDAPPT_GEN_ALL_CORE_FT
[facility]  CARDIOLOGY [address] [facility]  Scheduled Appointment: 05/22/2025

## 2025-03-30 NOTE — DISCHARGE NOTE PROVIDER - ATTENDING DISCHARGE PHYSICAL EXAMINATION:
Gen: NAD, resting in bed  HEENT: Normocephalic, atraumatic  Neck: supple, no lymphadenopathy  CV: Regular rate & regular rhythm  Lungs: decreased BS at bases, no fremitus, crackles, tachypneic  Abdomen: Soft, BS present, nontender  Ext: Warm, well perfused no edema  Neuro: moves all extremities against resistance, no droop, awake  Skin: no rash, no erythema

## 2025-03-30 NOTE — DISCHARGE NOTE PROVIDER - HOSPITAL COURSE
A 71 y/o male with pmhx of HFrEF 43 % , AICD, AVR, hypertension, paroxysmal A-fib status post ablation 1/14/24, CAD s/p PCI , diabetes, hyperlipidemia, COPD not on home O2 , current smoker, frequent  falls  here for assessment of dyspnea. Pt reports shortness of breath x 1 week. Pt reports shortness of breath with lying down and within the house. Pt reports compliant with torsemide. Pt reports LE edema. Pt denies cough or chest pain. Pt reports fell 1 x week ago , hit his L knee has pain with ambulation. Pt poor historian. PT does not know his cardiologist. Pt reports meds as per EMR as discharged recently no changes. Pt denies fever, chills, urinary symptoms, diarrhea.   [facility] O[facility] 87% at home, was on O2 en route, in ED 98% on RA.    # isolated episode of SI  - episode of SI shortly after admission  - pt cleared for dc by     #acute on chronic HFmrEF exacerbation   # AICD, AVR  #CAD s/p PCI   #Prolonged qtc  - ECHO 1/15/2025 EF 43 %.   - pro BNP 8 K (on 01/2025 16K )   - diuresed with IV lasix 40 bid   - strict I and O   - daily weight   - cardiology consult appreciated  - pt edu diet and medication compliance     # pneumonia ruled out  - CT chest non contrast - suggestive of overload  - leukocytosis  - afebrile  - procalcitonin 0.07  - dc Rocephin   - repeat cxr in a few months OP    # demand ischemia  - trop 72 >62  - cardiac monitor   - f/u cardio OP    # Mechanical Fall 1 x week ago   # L knee pain   #hx recurrent fall   -x-ray L knee no fx  -fall precaution   -PT consult     #?hx CKD   cr 1.4  --> 1.2  monitor cr level  avoid nephrotoxic meds     #Hx afib/flutter with recent ablation on 1/15/2025  -cw ELIQUIS 5 BID    #Hx COPD  not in exacerbation    #Hx CAD, CVA, HLD  continue home meds - statin , Plavix,  metoprolol     #hx DM   -hold PO meds  - 25/5/+2    #current smoker  -reports quit 3 days ago   -nicotine patch   -continuing smoking cessation advised     Pt HD stable, motivated to return home  Understands plan, answered questions, will make f/u appointments. A 71 y/o male with pmhx of HFrEF 43 % , AICD, AVR, hypertension, paroxysmal A-fib status post ablation 1/14/24, CAD s/p PCI , diabetes, hyperlipidemia, COPD not on home O2 , current smoker, frequent  falls  here for assessment of dyspnea. Pt reports shortness of breath x 1 week. Pt reports shortness of breath with lying down and within the house. Pt reports compliant with torsemide. Pt reports LE edema. Pt denies cough or chest pain. Pt reports fell 1 x week ago , hit his L knee has pain with ambulation. Pt poor historian. PT does not know his cardiologist. Pt reports meds as per EMR as discharged recently no changes. Pt denies fever, chills, urinary symptoms, diarrhea. [facility] O[facility] 87% at home, was on O2 en route, in ED 98% on RA. Admitted to medicine for HF exacerbation    CT chest obtained showing evidence of volume overload. Discontinued antibiotics as no concern for PNA. Monitored on tele with no events. Recent TTE reviewed showed EF 43%, deferred repeat. Placed on IV lasix 40mg BID for diuresis. Responded well to diuresis with good UOP. Transitioned back to home diuretic dosing. Was qualified for home O2 prior to discharge as was unable to wean completely off. Pt HD stable, motivated to return home. Understands plan, answered questions, will make f/u appointments.    # isolated episode of SI  - episode of SI shortly after admission  - pt cleared for dc by     #acute on chronic HFmrEF exacerbation   # AICD, AVR  #CAD s/p PCI   #Prolonged qtc  - ECHO 1/15/2025 EF 43 %.   - pro BNP 8 K (on 01/2025 16K )   - diuresed with IV lasix 40 bid   - strict I and O   - daily weight   - cardiology consult appreciated  - pt edu diet and medication compliance       # pneumonia ruled out  - CT chest non contrast - suggestive of overload  - leukocytosis  - afebrile  - procalcitonin 0.07  - dc Rocephin   - repeat cxr in a few months OP    # demand ischemia  - trop 72 >62  - cardiac monitor   - f/u cardio OP    # Mechanical Fall 1 x week ago   # L knee pain   #hx recurrent fall   -x-ray L knee no fx  -fall precaution   -PT consult     #?hx CKD   cr 1.4  --> 1.2  monitor cr level  avoid nephrotoxic meds     #Hx afib/flutter with recent ablation on 1/15/2025  -cw ELIQUIS 5 BID    #Hx COPD  not in exacerbation    #Hx CAD, CVA, HLD  continue home meds - statin , Plavix,  metoprolol     #hx DM   -hold PO meds  - 25/5/+2    #current smoker  -reports quit 3 days ago   -nicotine patch   -continuing smoking cessation advised     Pt HD stable, motivated to return home  Understands plan, answered questions, will make f/u appointments. A 69 y/o male with pmhx of HFrEF 43 % , AICD, AVR, hypertension, paroxysmal A-fib status post ablation 1/14/24, CAD s/p PCI , diabetes, hyperlipidemia, COPD not on home O2 , current smoker, frequent  falls  here for assessment of dyspnea. Pt reports shortness of breath x 1 week. Pt reports shortness of breath with lying down and within the house. Pt reports compliant with torsemide. Pt reports LE edema. Pt denies cough or chest pain. Pt reports fell 1 x week ago , hit his L knee has pain with ambulation. Pt poor historian. PT does not know his cardiologist. Pt reports meds as per EMR as discharged recently no changes. Pt denies fever, chills, urinary symptoms, diarrhea. [facility] O[facility] 87% at home, was on O2 en route, in ED 98% on RA. Admitted to medicine for HF exacerbation    CT chest obtained showing evidence of volume overload. Discontinued antibiotics as no concern for PNA. Monitored on tele with no events. Recent TTE reviewed showed EF 43%, deferred repeat. Placed on IV lasix 40mg BID for diuresis. Responded well to diuresis with good UOP. Transitioned back to home diuretic dosing. Was qualified for home O2 prior to discharge as was unable to wean completely off. Pt HD stable, motivated to return home. Understands plan, answered questions, will make f/u appointments.    # isolated episode of SI  - episode of SI shortly after admission  - pt cleared for dc by     #acute on chronic HFmrEF exacerbation   # AICD, AVR  #CAD s/p PCI   #Prolonged qtc  - ECHO 1/15/2025 EF 43 %.   - pro BNP 8 K (on 01/2025 16K )   - diuresed with IV lasix 40 bid   - being DC on supplimental O2, delivered to bedside and at home   - cardiology consult appreciated  - pt edu diet and medication compliance       # pneumonia ruled out  - CT chest non contrast - suggestive of overload  - leukocytosis  - afebrile  - procalcitonin 0.07  - dc Rocephin   - repeat cxr in a few months OP    # demand ischemia  - trop 72 >62  - cardiac monitor   - f/u cardio OP    # Mechanical Fall 1 x week ago   # L knee pain   #hx recurrent fall   -x-ray L knee no fx  -fall precaution   -PT consult     #?hx CKD   cr 1.4  --> 1.2  monitor cr level  avoid nephrotoxic meds     #Hx afib/flutter with recent ablation on 1/15/2025  -cw ELIQUIS 5 BID    #Hx COPD  not in exacerbation  home O2 provided     #Hx CAD, CVA, HLD  continue home meds - statin , Plavix,  metoprolol     #hx DM   -hold PO meds  - 25/5/+2    #current smoker  -reports quit 3 days ago   -nicotine patch   -continuing smoking cessation advised   -DISCUSSED Smoking w O2 at home being a Fire/Explosion hazard     Pt HD stable, motivated to return home  Understands plan, answered questions, will make f/u appointments.

## 2025-03-30 NOTE — CHART NOTE - NSCHARTNOTEFT_GEN_A_CORE
Vital Signs Last 24 Hrs  T(C): 36.6 (30 Mar 2025 04:23), Max: 36.7 (29 Mar 2025 14:46)  T(F): 97.9 (30 Mar 2025 04:23), Max: 98 (29 Mar 2025 14:46)  HR: 92 (30 Mar 2025 06:31) (70 - 93)  BP: 145/89 (30 Mar 2025 06:31) (125/82 - 167/99)  BP(mean): 108 (30 Mar 2025 06:31) (96 - 108)  RR: 18 (30 Mar 2025 06:31) (18 - 18)  SpO2: 97% (30 Mar 2025 06:31) (92% - 99%)  D/W Dr Lucas.  Minimal improvement with pt's behavior and pt has approx 30min left of the Depakote IVPB.  Will continue the Four point restraints for one more hour as well as the VS and reassess.

## 2025-03-30 NOTE — DISCHARGE NOTE PROVIDER - CARE PROVIDER_API CALL
Chhaya Mosquero  Internal Medicine  [address] [place] [id number]  Phone: [phone]  Fax: [phone]  Follow Up Time:

## 2025-03-31 LAB
A1C WITH ESTIMATED AVERAGE GLUCOSE RESULT: 9.7 % — HIGH (ref 4–5.6)
ANION GAP SERPL CALC-SCNC: 12 MMOL/L — SIGNIFICANT CHANGE UP (ref 7–14)
BUN SERPL-MCNC: 17 MG/DL — SIGNIFICANT CHANGE UP (ref 10–20)
CALCIUM SERPL-MCNC: 9 MG/DL — SIGNIFICANT CHANGE UP (ref 8.4–10.5)
CHLORIDE SERPL-SCNC: 101 MMOL/L — SIGNIFICANT CHANGE UP (ref 98–110)
CO2 SERPL-SCNC: 28 MMOL/L — SIGNIFICANT CHANGE UP (ref 17–32)
CREAT SERPL-MCNC: 1.2 MG/DL — SIGNIFICANT CHANGE UP (ref 0.7–1.5)
EGFR: 65 ML/MIN/1.73M2 — SIGNIFICANT CHANGE UP
EGFR: 65 ML/MIN/1.73M2 — SIGNIFICANT CHANGE UP
ESTIMATED AVERAGE GLUCOSE: 232 MG/DL — HIGH (ref 68–114)
GLUCOSE BLDC GLUCOMTR-MCNC: 167 MG/DL — HIGH (ref 70–99)
GLUCOSE BLDC GLUCOMTR-MCNC: 246 MG/DL — HIGH (ref 70–99)
GLUCOSE BLDC GLUCOMTR-MCNC: 263 MG/DL — HIGH (ref 70–99)
GLUCOSE BLDC GLUCOMTR-MCNC: 281 MG/DL — HIGH (ref 70–99)
GLUCOSE SERPL-MCNC: 209 MG/DL — HIGH (ref 70–99)
HCT VFR BLD CALC: 42.4 % — SIGNIFICANT CHANGE UP (ref 42–52)
HGB BLD-MCNC: 13.7 G/DL — LOW (ref 14–18)
MAGNESIUM SERPL-MCNC: 2.1 MG/DL — SIGNIFICANT CHANGE UP (ref 1.8–2.4)
MCHC RBC-ENTMCNC: 27.8 PG — SIGNIFICANT CHANGE UP (ref 27–31)
MCHC RBC-ENTMCNC: 32.3 G/DL — SIGNIFICANT CHANGE UP (ref 32–37)
MCV RBC AUTO: 86.2 FL — SIGNIFICANT CHANGE UP (ref 80–94)
NRBC BLD AUTO-RTO: 0 /100 WBCS — SIGNIFICANT CHANGE UP (ref 0–0)
PHOSPHATE SERPL-MCNC: 3.1 MG/DL — SIGNIFICANT CHANGE UP (ref 2.1–4.9)
PLATELET # BLD AUTO: 417 K/UL — HIGH (ref 130–400)
PMV BLD: 10.8 FL — HIGH (ref 7.4–10.4)
POTASSIUM SERPL-MCNC: 3.5 MMOL/L — SIGNIFICANT CHANGE UP (ref 3.5–5)
POTASSIUM SERPL-SCNC: 3.5 MMOL/L — SIGNIFICANT CHANGE UP (ref 3.5–5)
RBC # BLD: 4.92 M/UL — SIGNIFICANT CHANGE UP (ref 4.7–6.1)
RBC # FLD: 14.2 % — SIGNIFICANT CHANGE UP (ref 11.5–14.5)
SODIUM SERPL-SCNC: 141 MMOL/L — SIGNIFICANT CHANGE UP (ref 135–146)
WBC # BLD: 18.47 K/UL — HIGH (ref 4.8–10.8)
WBC # FLD AUTO: 18.47 K/UL — HIGH (ref 4.8–10.8)

## 2025-03-31 PROCEDURE — 99233 SBSQ HOSP IP/OBS HIGH 50: CPT

## 2025-03-31 PROCEDURE — 99233 SBSQ HOSP IP/OBS HIGH 50: CPT | Mod: 2W

## 2025-03-31 RX ADMIN — INSULIN LISPRO 2: 100 INJECTION, SOLUTION INTRAVENOUS; SUBCUTANEOUS at 13:26

## 2025-03-31 RX ADMIN — ATORVASTATIN CALCIUM 40 MILLIGRAM(S): 80 TABLET, FILM COATED ORAL at 21:28

## 2025-03-31 RX ADMIN — INSULIN LISPRO 5 UNIT(S): 100 INJECTION, SOLUTION INTRAVENOUS; SUBCUTANEOUS at 08:15

## 2025-03-31 RX ADMIN — SACUBITRIL AND VALSARTAN 1 TABLET(S): 6; 6 PELLET ORAL at 05:20

## 2025-03-31 RX ADMIN — AMIODARONE HYDROCHLORIDE 200 MILLIGRAM(S): 50 INJECTION, SOLUTION INTRAVENOUS at 05:19

## 2025-03-31 RX ADMIN — Medication 10 MILLIGRAM(S): at 21:28

## 2025-03-31 RX ADMIN — INSULIN LISPRO 6: 100 INJECTION, SOLUTION INTRAVENOUS; SUBCUTANEOUS at 16:43

## 2025-03-31 RX ADMIN — INSULIN LISPRO 5 UNIT(S): 100 INJECTION, SOLUTION INTRAVENOUS; SUBCUTANEOUS at 16:42

## 2025-03-31 RX ADMIN — INSULIN LISPRO 5 UNIT(S): 100 INJECTION, SOLUTION INTRAVENOUS; SUBCUTANEOUS at 12:19

## 2025-03-31 RX ADMIN — NICOTINE POLACRILEX 1 PATCH: 4 GUM, CHEWING ORAL at 12:30

## 2025-03-31 RX ADMIN — FENOFIBRATE 145 MILLIGRAM(S): 160 TABLET ORAL at 12:29

## 2025-03-31 RX ADMIN — Medication 40 MILLIGRAM(S): at 05:19

## 2025-03-31 RX ADMIN — APIXABAN 5 MILLIGRAM(S): 2.5 TABLET, FILM COATED ORAL at 05:19

## 2025-03-31 RX ADMIN — SACUBITRIL AND VALSARTAN 1 TABLET(S): 6; 6 PELLET ORAL at 17:34

## 2025-03-31 RX ADMIN — METOPROLOL SUCCINATE 50 MILLIGRAM(S): 50 TABLET, EXTENDED RELEASE ORAL at 05:19

## 2025-03-31 RX ADMIN — Medication 40 MILLIGRAM(S): at 17:30

## 2025-03-31 RX ADMIN — CLOPIDOGREL BISULFATE 75 MILLIGRAM(S): 75 TABLET, FILM COATED ORAL at 12:23

## 2025-03-31 RX ADMIN — EZETIMIBE 10 MILLIGRAM(S): 10 TABLET ORAL at 13:27

## 2025-03-31 RX ADMIN — NICOTINE POLACRILEX 1 PATCH: 4 GUM, CHEWING ORAL at 19:28

## 2025-03-31 RX ADMIN — APIXABAN 5 MILLIGRAM(S): 2.5 TABLET, FILM COATED ORAL at 17:30

## 2025-03-31 RX ADMIN — CEFTRIAXONE 100 MILLIGRAM(S): 500 INJECTION, POWDER, FOR SOLUTION INTRAMUSCULAR; INTRAVENOUS at 16:44

## 2025-03-31 RX ADMIN — INSULIN LISPRO 4: 100 INJECTION, SOLUTION INTRAVENOUS; SUBCUTANEOUS at 08:15

## 2025-03-31 RX ADMIN — INSULIN GLARGINE-YFGN 25 UNIT(S): 100 INJECTION, SOLUTION SUBCUTANEOUS at 21:28

## 2025-03-31 RX ADMIN — FUROSEMIDE 40 MILLIGRAM(S): 10 INJECTION INTRAMUSCULAR; INTRAVENOUS at 05:19

## 2025-03-31 RX ADMIN — Medication 1 APPLICATION(S): at 05:20

## 2025-03-31 NOTE — BH CONSULTATION LIAISON PROGRESS NOTE - NSBHCONSULTFOLLOWAFTERCARE_PSY_A_CORE FT
Patient declines referral to outpatient psychiatry
please see above  can provide psych referrals if patient would like

## 2025-03-31 NOTE — BH CONSULTATION LIAISON PROGRESS NOTE - NSBHFUPINTERVALHXFT_PSY_A_CORE
Interval chart reviewed, patient seen, case discussed. Last benzo received on 3/30. On depakote.     On assessment today, patient is sitting up in bed, AOx3. When asked to spell WORLD backwards states "no." When asked how he feels, patient states "I just had a stroke, does that mean anything to anybody? How am I supposed to trust you people?" Patient endorses feeling frustrated. States he was in restraints. Denies any depressed mood before the hospital or currently. Adamantly denies any suicidal thoughts. Denies any hallucinations. Denies any homicidal thoughts. States that he wants his belongings that security locked up. States he lives with his wife and son and things are "pretty good" at home.     Called Valencia (spouse) listed as emergency contact. No reply. Left message with call back number. Interval chart reviewed, patient seen, case discussed. Last benzo received on 3/30. On depakote.     On assessment today, patient is sitting up in bed, AOx3. When asked to spell WORLD backwards states "no." When asked how he feels, patient states "I just had a stroke, does that mean anything to anybody? How am I supposed to trust you people?" Patient endorses feeling frustrated. States he was in restraints. Denies any depressed mood before the hospital or currently. Adamantly denies any suicidal thoughts. Denies any hallucinations. Denies any homicidal thoughts. States that he wants his belongings that security locked up. States he lives with his wife and son and things are "pretty good" at home.     Called Valencia (spouse) listed as emergency contact. She denies any history of psych hospitalizations, self harm, suicide attempts. She denies any history of aggression. Reports things at home are "going well" and that patient is at his psych baseline. Asks more about medical issues and expresses concerns about patient's breathing and walking. Asks when he is coming home especially given his walking.  Interval chart reviewed, patient seen, case discussed. Last benzo received on 3/30. On depakote.     On assessment today, patient is sitting up in bed, AOx3. When asked to spell WORLD backwards states "no." When asked how he feels, patient states "I just had a stroke, does that mean anything to anybody? How am I supposed to trust you people?" Patient endorses feeling frustrated. States he was in restraints. Denies any depressed mood before the hospital or currently. Adamantly denies any suicidal thoughts. Denies any hallucinations. Denies any homicidal thoughts. States that he wants his belongings that security locked up. States he lives with his wife and son and things are "pretty good" at home.     Called Valencia (spouse) listed as emergency contact. She denies any history of psych hospitalizations, self harm, suicide attempts. She denies any history of aggression. Reports things at home are "going well" and that patient is at his psych baseline and funcitoning. Asks more about medical issues and expresses concerns about patient's breathing and walking. Asks when he is coming home especially given his walking.  Interval chart reviewed, patient seen, case discussed. Last benzo received on 3/30. On depakote.     On assessment today, patient is sitting up in bed, AOx3. When asked to spell WORLD backwards states "no." When asked how he feels, patient states "I just had a stroke, does that mean anything to anybody? How am I supposed to trust you people?" Patient endorses feeling frustrated. States he was in restraints. Denies any depressed mood before the hospital or currently. Adamantly denies any suicidal thoughts. Denies any hallucinations. Denies any homicidal thoughts. States that he wants his belongings that security locked up. States he lives with his wife and son and things are "pretty good" at home.     Called Valencia (spouse) listed as emergency contact. She denies any history of psych hospitalizations, self harm, suicide attempts. She denies any history of aggression. Reports things at home are "going well" and that patient is at his psych baseline and functioning. Asks more about medical issues and expresses concerns about patient's breathing and walking. Asks when he is coming home especially given his walking.

## 2025-03-31 NOTE — BH CONSULTATION LIAISON PROGRESS NOTE - CURRENT MEDICATION
MEDICATIONS  (STANDING):  aMIOdarone    Tablet 200 milliGRAM(s) Oral daily  apixaban 5 milliGRAM(s) Oral every 12 hours  atorvastatin 40 milliGRAM(s) Oral at bedtime  cefTRIAXone   IVPB      cefTRIAXone   IVPB 1000 milliGRAM(s) IV Intermittent every 24 hours  chlorhexidine 2% Cloths 1 Application(s) Topical <User Schedule>  clopidogrel Tablet 75 milliGRAM(s) Oral daily  dextrose 5%. 1000 milliLiter(s) (100 mL/Hr) IV Continuous <Continuous>  dextrose 5%. 1000 milliLiter(s) (50 mL/Hr) IV Continuous <Continuous>  dextrose 50% Injectable 25 Gram(s) IV Push once  dextrose 50% Injectable 12.5 Gram(s) IV Push once  dextrose 50% Injectable 25 Gram(s) IV Push once  ezetimibe 10 milliGRAM(s) Oral daily  fenofibrate Tablet 145 milliGRAM(s) Oral daily  furosemide   Injectable 40 milliGRAM(s) IV Push daily  glucagon  Injectable 1 milliGRAM(s) IntraMuscular once  insulin glargine Injectable (LANTUS) 25 Unit(s) SubCutaneous at bedtime  insulin lispro (ADMELOG) corrective regimen sliding scale   SubCutaneous three times a day before meals  insulin lispro Injectable (ADMELOG) 5 Unit(s) SubCutaneous three times a day before meals  melatonin 10 milliGRAM(s) Oral at bedtime  metoprolol succinate ER 50 milliGRAM(s) Oral daily  nicotine -  14 mG/24Hr(s) Patch 1 Patch Transdermal daily  pantoprazole    Tablet 40 milliGRAM(s) Oral two times a day  sacubitril 24 mG/valsartan 26 mG 1 Tablet(s) Oral two times a day    MEDICATIONS  (PRN):  acetaminophen     Tablet .. 650 milliGRAM(s) Oral every 6 hours PRN Temp greater or equal to 38C (100.4F), Mild Pain (1 - 3)  aluminum hydroxide/magnesium hydroxide/simethicone Suspension 30 milliLiter(s) Oral every 6 hours PRN Dyspepsia  dextrose Oral Gel 15 Gram(s) Oral once PRN Blood Glucose LESS THAN 70 milliGRAM(s)/deciliter  guaifenesin/dextromethorphan Oral Liquid 10 milliLiter(s) Oral every 6 hours PRN Cough  
MEDICATIONS  (STANDING):  aMIOdarone    Tablet 200 milliGRAM(s) Oral daily  apixaban 5 milliGRAM(s) Oral every 12 hours  atorvastatin 40 milliGRAM(s) Oral at bedtime  cefTRIAXone   IVPB      cefTRIAXone   IVPB 1000 milliGRAM(s) IV Intermittent every 24 hours  chlorhexidine 2% Cloths 1 Application(s) Topical <User Schedule>  clopidogrel Tablet 75 milliGRAM(s) Oral daily  dextrose 5%. 1000 milliLiter(s) (50 mL/Hr) IV Continuous <Continuous>  dextrose 5%. 1000 milliLiter(s) (100 mL/Hr) IV Continuous <Continuous>  dextrose 50% Injectable 25 Gram(s) IV Push once  dextrose 50% Injectable 12.5 Gram(s) IV Push once  dextrose 50% Injectable 25 Gram(s) IV Push once  ezetimibe 10 milliGRAM(s) Oral daily  fenofibrate Tablet 145 milliGRAM(s) Oral daily  furosemide   Injectable 40 milliGRAM(s) IV Push daily  furosemide   Injectable 40 milliGRAM(s) IV Push every 6 hours  glucagon  Injectable 1 milliGRAM(s) IntraMuscular once  insulin glargine Injectable (LANTUS) 25 Unit(s) SubCutaneous at bedtime  insulin lispro (ADMELOG) corrective regimen sliding scale   SubCutaneous three times a day before meals  insulin lispro Injectable (ADMELOG) 5 Unit(s) SubCutaneous three times a day before meals  melatonin 10 milliGRAM(s) Oral at bedtime  metoprolol succinate ER 50 milliGRAM(s) Oral daily  nicotine -  14 mG/24Hr(s) Patch 1 Patch Transdermal daily  pantoprazole    Tablet 40 milliGRAM(s) Oral two times a day  sacubitril 24 mG/valsartan 26 mG 1 Tablet(s) Oral two times a day    MEDICATIONS  (PRN):  acetaminophen     Tablet .. 650 milliGRAM(s) Oral every 6 hours PRN Temp greater or equal to 38C (100.4F), Mild Pain (1 - 3)  aluminum hydroxide/magnesium hydroxide/simethicone Suspension 30 milliLiter(s) Oral every 6 hours PRN Dyspepsia  dextrose Oral Gel 15 Gram(s) Oral once PRN Blood Glucose LESS THAN 70 milliGRAM(s)/deciliter  guaifenesin/dextromethorphan Oral Liquid 10 milliLiter(s) Oral every 6 hours PRN Cough

## 2025-03-31 NOTE — PROGRESS NOTE ADULT - NS ATTEND AMEND GEN_ALL_CORE FT
Patient seen and examined. Pertinent labs, imaging and telemetry reviewed. I agree with the above:     Patient still with some likely pleural effusions given decreased BS B/L on exam.   -LE much improved.   -Likely can transition to PO diuretics tomorrow.  -Continue to wean off O2.

## 2025-03-31 NOTE — BH CONSULTATION LIAISON PROGRESS NOTE - NSBHASSESSMENTFT_PSY_ALL_CORE
Patient is a 70 y.o.  male domiciled at private residence with past medical hx of HFrEF 43 % , AICD, AVR, hypertension, paroxysmal A-fib status post ablation 1/14/24, CAD s/p PCI , diabetes, hyperlipidemia, COPD not on home O2 , frequent falls; no reported formal past psych hx except some psychotherapy; admitted to medical floor for treatment of dyspnea/CHF exacerbation. Psychiatry consulted for assessment of SI.    At this time patient is not fully oriented, though per discussion with primary team his delirium is much improved today. Though patient's presentation is concerning for chronic MDD and PTSD, he is adamant that he does not want treatment for either due to not wanting to add more medications to the long list of medications he already has to take. He vehemently denies suicidality, explains his prior statement by the sentiment of wanting to be with his loved ones instead of in the hospital even if it means increased risk of death as he is not afraid of dying thought he does not want to. He reports intention to continue his medical treatment as prescribed out of the hospital. Given patient's last QTc > 500, recommend obtaining a f/u EKG in setting of improved hypoxia. May also consider magnesium supplementation for further cardiac stabilization (last level 2.0). If QTc < 500, can give PRN haldol 2 mg q8h for agitation; if QTc > 500, can give depakote 125 mg PO/IV. AVOID BENZODIAZEPINES WHEN POSSIBLE DUE TO RISK OF PARADOXICAL AGITATION AND DELIRIUM.    3/30: Patient continues to be delirious with episodes of confusion, lethargy, and agitation. QTC continues to be prolonged.  3/31: AOx3, more alert. Some impairments with attention. Continues to deny any suicidality and notes his family as a protective factor. Denies AH or HI. Reports he is not interested in medication for chronic depression and/or PTSD. Reports he is already on a lot of medications.    Recommendations:  -F/up EKG since qtc was greater than 500 on last EKG  -Given concern for delirium, would avoid benzos due to paradoxical reaction and worsening of delirium. Can use Depakote instead  -If QTc < 500, can give PRN haldol 2 mg q8h for agitation; if QTc > 500, can give depakote 125 mg PO/IV. Please monitor for dec plt, inc lfts, and other side effects from depakote.   -Currently does not present with any acute psych safety concern that meets criteria for involuntary psych admission. Eating and sleeping and advocating for needs and preferences on interview.    Patient is a 70 y.o.  male domiciled at private residence with past medical hx of HFrEF 43 % , AICD, AVR, hypertension, paroxysmal A-fib status post ablation 1/14/24, CAD s/p PCI , diabetes, hyperlipidemia, COPD not on home O2 , frequent falls; no reported formal past psych hx except some psychotherapy; admitted to medical floor for treatment of dyspnea/CHF exacerbation. Psychiatry consulted for assessment of SI.    At this time patient is not fully oriented, though per discussion with primary team his delirium is much improved today. Though patient's presentation is concerning for chronic MDD and PTSD, he is adamant that he does not want treatment for either due to not wanting to add more medications to the long list of medications he already has to take. He vehemently denies suicidality, explains his prior statement by the sentiment of wanting to be with his loved ones instead of in the hospital even if it means increased risk of death as he is not afraid of dying thought he does not want to. He reports intention to continue his medical treatment as prescribed out of the hospital. Given patient's last QTc > 500, recommend obtaining a f/u EKG in setting of improved hypoxia. May also consider magnesium supplementation for further cardiac stabilization (last level 2.0). If QTc < 500, can give PRN haldol 2 mg q8h for agitation; if QTc > 500, can give depakote 125 mg PO/IV. AVOID BENZODIAZEPINES WHEN POSSIBLE DUE TO RISK OF PARADOXICAL AGITATION AND DELIRIUM.    3/30: Patient continues to be delirious with episodes of confusion, lethargy, and agitation. QTC continues to be prolonged.  3/31: AOx3, more alert. Some impairments with attention. Continues to deny any suicidality and notes his family as a protective factor. Denies AH or HI. Reports he is not interested in medication for chronic depression and/or PTSD. Reports he is already on a lot of medications.    Recommendations:    -F/up EKG since qtc was greater than 500 on last EKG    -Given concern for delirium, would avoid benzos due to paradoxical reaction and worsening of delirium. Can use Depakote instead    -If QTc < 500, can give PRN haldol 2 mg q8h for agitation; if QTc > 500, can give depakote 125 mg PO/IV. Please monitor for dec plt, inc lfts, and other side effects from depakote.     -Currently does not present with any acute psych safety concern that meets criteria for involuntary psych admission. Eating and sleeping and advocating for needs and preferences on interview.    Patient is a 70 y.o.  male domiciled at private residence with past medical hx of HFrEF 43 % , AICD, AVR, hypertension, paroxysmal A-fib status post ablation 1/14/24, CAD s/p PCI , diabetes, hyperlipidemia, COPD not on home O2 , frequent falls; no reported formal past psych hx except some psychotherapy; admitted to medical floor for treatment of dyspnea/CHF exacerbation. Psychiatry consulted for assessment of SI.    At this time patient is not fully oriented, though per discussion with primary team his delirium is much improved today. Though patient's presentation is concerning for chronic MDD and PTSD, he is adamant that he does not want treatment for either due to not wanting to add more medications to the long list of medications he already has to take. He vehemently denies suicidality, explains his prior statement by the sentiment of wanting to be with his loved ones instead of in the hospital even if it means increased risk of death as he is not afraid of dying thought he does not want to. He reports intention to continue his medical treatment as prescribed out of the hospital. Given patient's last QTc > 500, recommend obtaining a f/u EKG in setting of improved hypoxia. May also consider magnesium supplementation for further cardiac stabilization (last level 2.0). If QTc < 500, can give PRN haldol 2 mg q8h for agitation; if QTc > 500, can give depakote 125 mg PO/IV. AVOID BENZODIAZEPINES WHEN POSSIBLE DUE TO RISK OF PARADOXICAL AGITATION AND DELIRIUM.    3/30: Patient continues to be delirious with episodes of confusion, lethargy, and agitation. QTC continues to be prolonged.  3/31: AOx3, more alert. Some impairments with attention. Continues to deny any suicidality and notes his family as a protective factor. Denies AH or HI. Reports he is not interested in medication for chronic depression and/or PTSD. Reports he is already on a lot of medications.    Recommendations:    -F/up EKG since qtc was greater than 500 on last EKG    -Given concern for delirium, would avoid benzos due to paradoxical reaction and worsening of delirium. Can use Depakote instead    -If QTc < 500, can give PRN haldol 2 mg q8h for agitation; if QTc > 500, can give depakote 125 mg PO/IV. Please monitor for dec plt, inc lfts, and other side effects from depakote.     -Currently does not present with any acute psych safety concern that meets criteria for involuntary psych admission.

## 2025-03-31 NOTE — BH CONSULTATION LIAISON PROGRESS NOTE - NSBHCHARTREVIEWVS_PSY_A_CORE FT
Vital Signs Last 24 Hrs  T(C): 36.8 (31 Mar 2025 04:30), Max: 36.9 (30 Mar 2025 20:04)  T(F): 98.2 (31 Mar 2025 04:30), Max: 98.5 (30 Mar 2025 20:04)  HR: 95 (31 Mar 2025 04:30) (89 - 95)  BP: 147/71 (31 Mar 2025 04:30) (128/67 - 147/71)  BP(mean): --  RR: 18 (31 Mar 2025 04:30) (16 - 18)  SpO2: 98% (31 Mar 2025 04:30) (93% - 98%)    Parameters below as of 30 Mar 2025 20:04  Patient On (Oxygen Delivery Method): nasal cannula  O2 Flow (L/min): 2  
Vital Signs Last 24 Hrs  T(C): 36.2 (30 Mar 2025 13:28), Max: 36.7 (29 Mar 2025 21:00)  T(F): 97.2 (30 Mar 2025 13:28), Max: 98 (29 Mar 2025 21:00)  HR: 89 (30 Mar 2025 13:28) (72 - 96)  BP: 131/86 (30 Mar 2025 13:28) (125/82 - 167/99)  BP(mean): 100 (30 Mar 2025 07:03) (96 - 108)  RR: 16 (30 Mar 2025 13:28) (16 - 18)  SpO2: 93% (30 Mar 2025 13:28) (92% - 99%)    Parameters below as of 30 Mar 2025 07:30  Patient On (Oxygen Delivery Method): nasal cannula  O2 Flow (L/min): 2

## 2025-03-31 NOTE — BH CONSULTATION LIAISON PROGRESS NOTE - NSICDXBHTERTIARYDX_PSY_ALL_CORE
R/O MDD (major depressive disorder)   F32.9  R/O Post traumatic stress disorder (PTSD)   F43.10  
R/O MDD (major depressive disorder)   F32.9  R/O Post traumatic stress disorder (PTSD)   F43.10

## 2025-04-01 LAB
ANION GAP SERPL CALC-SCNC: 13 MMOL/L — SIGNIFICANT CHANGE UP (ref 7–14)
BUN SERPL-MCNC: 22 MG/DL — HIGH (ref 10–20)
CALCIUM SERPL-MCNC: 9.2 MG/DL — SIGNIFICANT CHANGE UP (ref 8.4–10.5)
CHLORIDE SERPL-SCNC: 97 MMOL/L — LOW (ref 98–110)
CO2 SERPL-SCNC: 29 MMOL/L — SIGNIFICANT CHANGE UP (ref 17–32)
CREAT SERPL-MCNC: 1.2 MG/DL — SIGNIFICANT CHANGE UP (ref 0.7–1.5)
EGFR: 65 ML/MIN/1.73M2 — SIGNIFICANT CHANGE UP
EGFR: 65 ML/MIN/1.73M2 — SIGNIFICANT CHANGE UP
GLUCOSE BLDC GLUCOMTR-MCNC: 128 MG/DL — HIGH (ref 70–99)
GLUCOSE BLDC GLUCOMTR-MCNC: 222 MG/DL — HIGH (ref 70–99)
GLUCOSE BLDC GLUCOMTR-MCNC: 241 MG/DL — HIGH (ref 70–99)
GLUCOSE SERPL-MCNC: 127 MG/DL — HIGH (ref 70–99)
HCT VFR BLD CALC: 43.1 % — SIGNIFICANT CHANGE UP (ref 42–52)
HGB BLD-MCNC: 13.9 G/DL — LOW (ref 14–18)
MAGNESIUM SERPL-MCNC: 2 MG/DL — SIGNIFICANT CHANGE UP (ref 1.8–2.4)
MCHC RBC-ENTMCNC: 27.7 PG — SIGNIFICANT CHANGE UP (ref 27–31)
MCHC RBC-ENTMCNC: 32.3 G/DL — SIGNIFICANT CHANGE UP (ref 32–37)
MCV RBC AUTO: 85.9 FL — SIGNIFICANT CHANGE UP (ref 80–94)
NRBC BLD AUTO-RTO: 0 /100 WBCS — SIGNIFICANT CHANGE UP (ref 0–0)
PHOSPHATE SERPL-MCNC: 3.2 MG/DL — SIGNIFICANT CHANGE UP (ref 2.1–4.9)
PLATELET # BLD AUTO: 426 K/UL — HIGH (ref 130–400)
PMV BLD: 10.7 FL — HIGH (ref 7.4–10.4)
POTASSIUM SERPL-MCNC: 3.3 MMOL/L — LOW (ref 3.5–5)
POTASSIUM SERPL-SCNC: 3.3 MMOL/L — LOW (ref 3.5–5)
RBC # BLD: 5.02 M/UL — SIGNIFICANT CHANGE UP (ref 4.7–6.1)
RBC # FLD: 14 % — SIGNIFICANT CHANGE UP (ref 11.5–14.5)
SODIUM SERPL-SCNC: 139 MMOL/L — SIGNIFICANT CHANGE UP (ref 135–146)
WBC # BLD: 12.68 K/UL — HIGH (ref 4.8–10.8)
WBC # FLD AUTO: 12.68 K/UL — HIGH (ref 4.8–10.8)

## 2025-04-01 PROCEDURE — 99233 SBSQ HOSP IP/OBS HIGH 50: CPT

## 2025-04-01 PROCEDURE — 93010 ELECTROCARDIOGRAM REPORT: CPT

## 2025-04-01 RX ORDER — HYDROXYZINE HYDROCHLORIDE 25 MG/1
25 TABLET, FILM COATED ORAL ONCE
Refills: 0 | Status: COMPLETED | OUTPATIENT
Start: 2025-04-01 | End: 2025-04-01

## 2025-04-01 RX ADMIN — INSULIN LISPRO 5 UNIT(S): 100 INJECTION, SOLUTION INTRAVENOUS; SUBCUTANEOUS at 17:12

## 2025-04-01 RX ADMIN — Medication 40 MILLIGRAM(S): at 05:35

## 2025-04-01 RX ADMIN — INSULIN LISPRO 5 UNIT(S): 100 INJECTION, SOLUTION INTRAVENOUS; SUBCUTANEOUS at 09:51

## 2025-04-01 RX ADMIN — APIXABAN 5 MILLIGRAM(S): 2.5 TABLET, FILM COATED ORAL at 05:34

## 2025-04-01 RX ADMIN — INSULIN LISPRO 4: 100 INJECTION, SOLUTION INTRAVENOUS; SUBCUTANEOUS at 17:12

## 2025-04-01 RX ADMIN — SACUBITRIL AND VALSARTAN 1 TABLET(S): 6; 6 PELLET ORAL at 17:19

## 2025-04-01 RX ADMIN — NICOTINE POLACRILEX 1 PATCH: 4 GUM, CHEWING ORAL at 11:29

## 2025-04-01 RX ADMIN — FENOFIBRATE 145 MILLIGRAM(S): 160 TABLET ORAL at 11:25

## 2025-04-01 RX ADMIN — CLOPIDOGREL BISULFATE 75 MILLIGRAM(S): 75 TABLET, FILM COATED ORAL at 11:25

## 2025-04-01 RX ADMIN — Medication 20 MILLIEQUIVALENT(S): at 11:20

## 2025-04-01 RX ADMIN — Medication 40 MILLIGRAM(S): at 17:19

## 2025-04-01 RX ADMIN — HYDROXYZINE HYDROCHLORIDE 25 MILLIGRAM(S): 25 TABLET, FILM COATED ORAL at 00:24

## 2025-04-01 RX ADMIN — FUROSEMIDE 40 MILLIGRAM(S): 10 INJECTION INTRAMUSCULAR; INTRAVENOUS at 05:34

## 2025-04-01 RX ADMIN — EZETIMIBE 10 MILLIGRAM(S): 10 TABLET ORAL at 11:25

## 2025-04-01 RX ADMIN — METOPROLOL SUCCINATE 50 MILLIGRAM(S): 50 TABLET, EXTENDED RELEASE ORAL at 05:34

## 2025-04-01 RX ADMIN — SACUBITRIL AND VALSARTAN 1 TABLET(S): 6; 6 PELLET ORAL at 05:35

## 2025-04-01 RX ADMIN — Medication 20 MILLIEQUIVALENT(S): at 09:49

## 2025-04-01 RX ADMIN — AMIODARONE HYDROCHLORIDE 200 MILLIGRAM(S): 50 INJECTION, SOLUTION INTRAVENOUS at 05:35

## 2025-04-01 RX ADMIN — INSULIN LISPRO 5 UNIT(S): 100 INJECTION, SOLUTION INTRAVENOUS; SUBCUTANEOUS at 11:43

## 2025-04-01 RX ADMIN — APIXABAN 5 MILLIGRAM(S): 2.5 TABLET, FILM COATED ORAL at 17:18

## 2025-04-01 NOTE — PROGRESS NOTE ADULT - ASSESSMENT
# isolated episode of SI - resolved  - episode of SI shortly after admission  - pt cleared for dc by     #acute on chronic HFmrEF exacerbation   # AICD, AVR  #CAD s/p PCI   #Prolonged qtc  - ECHO 1/15/2025 EF 43 %.   - pro BNP 8 K (on 01/2025 16K )   - diuresed with IV lasix 40 bid   - strict I and O   - daily weight   - cardiology consult appreciated  - pt edu diet and medication compliance     # pneumonia ruled out  - CT chest non contrast - suggestive of overload  - leukocytosis  - afebrile  - procalcitonin 0.07  - dc Rocephin   - repeat cxr in a few months OP    # demand ischemia  - trop 72 >62  - cardiac monitor   - f/u cardio OP    # Mechanical Fall 1 x week ago   # L knee pain   #hx recurrent fall   -x-ray L knee no fx  -fall precaution   -PT consult     #?hx CKD   cr 1.4  --> 1.2  monitor cr level  avoid nephrotoxic meds     #Hx afib/flutter with recent ablation on 1/15/2025  -cw ELIQUIS 5 BID    #Hx COPD  not in exacerbation    #Hx CAD, CVA, HLD  continue home meds - statin , Plavix,  metoprolol     #hx DM   -hold PO meds  - 25/5/+2    #current smoker  -reports quit 3 days ago   -nicotine patch   -continuing smoking cessation advised     
# isolated episode of SI - resolved  - episode of SI shortly after admission  - pt cleared for dc by     #acute on chronic HFmrEF exacerbation   # AICD, AVR  #CAD s/p PCI   #Prolonged qtc  - ECHO 1/15/2025 EF 43 %.   - pro BNP 8 K (on 01/2025 16K )   - diuresed with IV lasix 40 bid   - strict I and O   - daily weight   - cardiology consult appreciated  - pt edu diet and medication compliance     # pneumonia ruled out  - CT chest non contrast - suggestive of overload  - leukocytosis  - afebrile  - procalcitonin 0.07  - dc Rocephin   - repeat cxr in a few months OP    # demand ischemia  - trop 72 >62  - cardiac monitor   - f/u cardio OP    # Mechanical Fall 1 x week ago   # L knee pain   #hx recurrent fall   -x-ray L knee no fx  -fall precaution   -PT consult     #?hx CKD   cr 1.4  --> 1.2  monitor cr level  avoid nephrotoxic meds     #Hx afib/flutter with recent ablation on 1/15/2025  -cw ELIQUIS 5 BID    #Hx COPD  not in exacerbation    #Hx CAD, CVA, HLD  continue home meds - statin , Plavix,  metoprolol     #hx DM   -hold PO meds  - 25/5/+2    #current smoker  -reports quit 3 days ago   -nicotine patch   -continuing smoking cessation advised     
# SI  - told pt wanted to AMA and went to examine pt.  Pt off O2 desatting to low 80s  - began to explain risks of AMA when pt said "Good, I hope I drop dead.  I'd rather die than stay here"  - author probed for more information when pt responded "let me be clear, I'd rather die in the street than live in this [explitive]"  - iso desating and SI will get safety tray, 1:1, and BH clearance prior to AMA    #acute on chronic HFmrEF exacerbation   # AICD, AVR  #CAD s/p PCI   #Prolonged qtc  - ECHO 1/15/2025 EF 43 %.   - pro BNP 8 K (on 01/2025 16K )   - IV lasix 40 bid today; reevaluate in AM  - strict I and O   - daily weight   - cardiology consult     # suspected pneumonia  -CT chest non contrast - pt refused  - cxr w/ new opacs  -WBC improving  -afebrile  -follow up procalcitonin   - f/u Bcx   - f/u UA  - will start on Rocephin     # demand ischemia  - trop 72 >62  - cardiac monitor   - f/u cardio recs    # Mechanical Fall 1 x week ago   # L knee pain   #hx recurrent fall   -x-ray L knee no fx  -fall precaution   -PT consult     #hx CKD   cr 1.4  (baseline above 1.6)  monitor cr level  avoid nephrotoxic meds     #Hx afib/flutter with recent ablation on 1/15/2025  -cw ELIQUIS 5 BID    #Hx COPD  not in exacerbation    #Hx CAD, CVA, HLD  continue home meds - statin , Plavix,  metoprolol     #hx DM   -hold PO meds  - 25/5/+2    #current smoker  -reports quit 3 days ago   -nicotine patch   -continuing smoking cessation advised     DVT prophylaxis - Eliquis   CODE status FULL       
71 y/o male with pmhx of HFrEF 43 % , AICD, AVR, hypertension, paroxysmal A-fib status post ablation 1/14/24, CAD s/p PCI , diabetes, hyperlipidemia, COPD not on home O2 , current smoker, frequent  falls  here for assessment of dyspnea and chest pain.      - TTE: EF 43%/ mild-mod decreased global LVSF  - CXR- New bilateral opacities    Impression:  # HFmrEF exacerbation   - pt presented with cough and congestion, sob possibly multifactorial  - currently has 1;1 sitter for SI  - he has signs of volume overload on exam  - continue with Diuresis for now  - increasing his entresto dosage as above  - Strict I/Os and standing daily weights  - Fluid restriction   - Trend BMP 2/2 diuresis and replete as needed, k>4, mg> 2  - Once his clinical status improves may be transferred to Psych unit for additional monitoring and therapy.

## 2025-04-01 NOTE — PROGRESS NOTE ADULT - SUBJECTIVE AND OBJECTIVE BOX
LENGTH OF HOSPITAL STAY: 1d    CHIEF COMPLAINT:    Patient is a 70y old  Male who presents with a chief complaint of HFmrEF exacerbation (28 Mar 2025 17:02)    OVERNIGHT EVENTS:    - tele reviewed by me NSR 76 wide QRS some PVCs  - agitated overnight  - pt examined at bedside, agitated, discussed plan and answered questions  - coughing with PO, having BMs, making urine without urinary sxs    FOLLOW UP:    - diuresis, speech, keep tele,     HPI:    A 71 y/o male with pmhx of HFrEF 43 % , AICD, AVR, hypertension, paroxysmal A-fib status post ablation 1/14/24, CAD s/p PCI , diabetes, hyperlipidemia, COPD not on home O2 , current smoker, frequent  falls  here for assessment of dyspnea. Pt reports shortness of breath x 1 week. Pt reports shortness of breath with lying down and within the house. Pt reports compliant with torsemide. Pt reports LE edema. Pt denies cough or chest pain. Pt reports fell 1 x week ago , hit his L knee has pain with ambulation. Pt poor historian. PT does not know his cardiologist. Pt reports meds as per EMR as discharged recently no changes. Pt denies fever, chills, urinary symptoms, diarrhea.   [facility] 87% at home, was on O2 en route, in ED 98% on RA.     (28 Mar 2025 09:54)      ALLERGIES:    Bactrim    PMHx:    CHF (congestive heart failure)  Diabetes  CAD (coronary artery disease)  Chronic obstructive pulmonary disease (COPD)  HTN (hypertension)  Stented coronary artery  Dyslipidemia  GERD (gastroesophageal reflux disease)  Afib  CVA (cerebral vascular accident)  H/O heart artery stent  Heart valve replaced  aortic  History of Nissen fundoplication    MEDICATIONS:  STANDING MEDICATIONS  aMIOdarone    Tablet 200 milliGRAM(s) Oral daily  apixaban 5 milliGRAM(s) Oral every 12 hours  atorvastatin 40 milliGRAM(s) Oral at bedtime  cefTRIAXone   IVPB      cefTRIAXone   IVPB 1000 milliGRAM(s) IV Intermittent every 24 hours  chlorhexidine 2% Cloths 1 Application(s) Topical <User Schedule>  clopidogrel Tablet 75 milliGRAM(s) Oral daily  dextrose 5%. 1000 milliLiter(s) IV Continuous <Continuous>  dextrose 5%. 1000 milliLiter(s) IV Continuous <Continuous>  dextrose 50% Injectable 25 Gram(s) IV Push once  dextrose 50% Injectable 12.5 Gram(s) IV Push once  dextrose 50% Injectable 25 Gram(s) IV Push once  ezetimibe 10 milliGRAM(s) Oral daily  fenofibrate Tablet 145 milliGRAM(s) Oral daily  furosemide   Injectable 40 milliGRAM(s) IV Push daily  furosemide   Injectable 40 milliGRAM(s) IV Push once  glucagon  Injectable 1 milliGRAM(s) IntraMuscular once  insulin glargine Injectable (LANTUS) 25 Unit(s) SubCutaneous at bedtime  insulin lispro (ADMELOG) corrective regimen sliding scale   SubCutaneous three times a day before meals  insulin lispro Injectable (ADMELOG) 5 Unit(s) SubCutaneous three times a day before meals  melatonin 10 milliGRAM(s) Oral at bedtime  metoprolol succinate ER 50 milliGRAM(s) Oral daily  nicotine -  14 mG/24Hr(s) Patch 1 Patch Transdermal daily  pantoprazole    Tablet 40 milliGRAM(s) Oral two times a day  sacubitril 24 mG/valsartan 26 mG 1 Tablet(s) Oral two times a day    PRN MEDICATIONS  acetaminophen     Tablet .. 650 milliGRAM(s) Oral every 6 hours PRN  dextrose Oral Gel 15 Gram(s) Oral once PRN      LABS:                        13.0   16.40 )-----------( 399      ( 29 Mar 2025 06:23 )             40.1     03-29    138  |  104  |  20  ----------------------------<  217[H]  4.3   |  20  |  1.3    Ca    9.6      29 Mar 2025 06:23  Mg     2.0     03-28    TPro  6.5  /  Alb  3.5  /  TBili  0.6  /  DBili  x   /  AST  14  /  ALT  13  /  AlkPhos  93  03-29      Urinalysis Basic - ( 29 Mar 2025 06:23 )    Color: x / Appearance: x / SG: x / pH: x  Gluc: 217 mg/dL / Ketone: x  / Bili: x / Urobili: x   Blood: x / Protein: x / Nitrite: x   Leuk Esterase: x / RBC: x / WBC x   Sq Epi: x / Non Sq Epi: x / Bacteria: x      VITALS:   T(F): 98.4  HR: 65  BP: 155/87  RR: 18  SpO2: 92%        PHYSICAL EXAM:    Gen: NAD, resting in bed  HEENT: Normocephalic, atraumatic  Neck: supple, no lymphadenopathy  CV: Regular rate & regular rhythm  Lungs: decreased BS at bases, no fremitus, crackles, tachypnic, NC around forehead  Abdomen: Soft, BS present, nontender  Ext: Warm, well perfused no edema  Neuro: moves all extremities against resistance, no droop, awake  Skin: no rash, no erythema  
LENGTH OF HOSPITAL STAY: 4d    CHIEF COMPLAINT:    Patient is a 70y old  Male who presents with a chief complaint of chf exacerbation (30 Mar 2025 10:18)      OVERNIGHT EVENTS:    - tele reviewed by me paced w/ some pvcs  - no overnight events   - pt examined at bedside, discussed plan and answered questions  - tolerating PO, having BMs, making urine without urinary sxs    FOLLOW UP:    - clinical improvement    HPI:    A 71 y/o male with pmhx of HFrEF 43 % , AICD, AVR, hypertension, paroxysmal A-fib status post ablation 1/14/24, CAD s/p PCI , diabetes, hyperlipidemia, COPD not on home O2 , current smoker, frequent  falls  here for assessment of dyspnea. Pt reports shortness of breath x 1 week. Pt reports shortness of breath with lying down and within the house. Pt reports compliant with torsemide. Pt reports LE edema. Pt denies cough or chest pain. Pt reports fell 1 x week ago , hit his L knee has pain with ambulation. Pt poor historian. PT does not know his cardiologist. Pt reports meds as per EMR as discharged recently no changes. Pt denies fever, chills, urinary symptoms, diarrhea.   [facility] O[facility] 87% at home, was on O2 en route, in ED 98% on RA.     (28 Mar 2025 09:54)      ALLERGIES:    Bactrim (Unknown)    PMHx:    CHF (congestive heart failure)  Diabetes  CAD (coronary artery disease)  Chronic obstructive pulmonary disease (COPD)  HTN (hypertension)  Stented coronary artery  Dyslipidemia  GERD (gastroesophageal reflux disease)  Afib  CVA (cerebral vascular accident)  H/O heart artery stent  Heart valve replaced  aortic  History of Nissen fundoplication    MEDICATIONS:  STANDING MEDICATIONS  aMIOdarone    Tablet 200 milliGRAM(s) Oral daily  apixaban 5 milliGRAM(s) Oral every 12 hours  atorvastatin 40 milliGRAM(s) Oral at bedtime  cefTRIAXone   IVPB      cefTRIAXone   IVPB 1000 milliGRAM(s) IV Intermittent every 24 hours  chlorhexidine 2% Cloths 1 Application(s) Topical <User Schedule>  clopidogrel Tablet 75 milliGRAM(s) Oral daily  dextrose 5%. 1000 milliLiter(s) IV Continuous <Continuous>  dextrose 5%. 1000 milliLiter(s) IV Continuous <Continuous>  dextrose 50% Injectable 25 Gram(s) IV Push once  dextrose 50% Injectable 12.5 Gram(s) IV Push once  dextrose 50% Injectable 25 Gram(s) IV Push once  ezetimibe 10 milliGRAM(s) Oral daily  fenofibrate Tablet 145 milliGRAM(s) Oral daily  furosemide   Injectable 40 milliGRAM(s) IV Push daily  glucagon  Injectable 1 milliGRAM(s) IntraMuscular once  insulin glargine Injectable (LANTUS) 25 Unit(s) SubCutaneous at bedtime  insulin lispro (ADMELOG) corrective regimen sliding scale   SubCutaneous three times a day before meals  insulin lispro Injectable (ADMELOG) 5 Unit(s) SubCutaneous three times a day before meals  melatonin 10 milliGRAM(s) Oral at bedtime  metoprolol succinate ER 50 milliGRAM(s) Oral daily  nicotine -  14 mG/24Hr(s) Patch 1 Patch Transdermal daily  pantoprazole    Tablet 40 milliGRAM(s) Oral two times a day  potassium chloride    Tablet ER 20 milliEquivalent(s) Oral every 2 hours  sacubitril 24 mG/valsartan 26 mG 1 Tablet(s) Oral two times a day    PRN MEDICATIONS  acetaminophen     Tablet .. 650 milliGRAM(s) Oral every 6 hours PRN  aluminum hydroxide/magnesium hydroxide/simethicone Suspension 30 milliLiter(s) Oral every 6 hours PRN  dextrose Oral Gel 15 Gram(s) Oral once PRN  guaifenesin/dextromethorphan Oral Liquid 10 milliLiter(s) Oral every 6 hours PRN      LABS:                        13.9   12.68 )-----------( 426      ( 01 Apr 2025 06:43 )             43.1     04-01    139  |  97[L]  |  22[H]  ----------------------------<  127[H]  3.3[L]   |  29  |  1.2    Ca    9.2      01 Apr 2025 06:43  Phos  3.2     04-01  Mg     2.0     04-01        Urinalysis Basic - ( 01 Apr 2025 06:43 )    Color: x / Appearance: x / SG: x / pH: x  Gluc: 127 mg/dL / Ketone: x  / Bili: x / Urobili: x   Blood: x / Protein: x / Nitrite: x   Leuk Esterase: x / RBC: x / WBC x   Sq Epi: x / Non Sq Epi: x / Bacteria: x      VITALS:   T(F): 98.2  HR: 55  BP: 127/75  RR: 18  SpO2: 97%    PHYSICAL EXAM:    Gen: NAD, resting in bed thin frail  HEENT: Normocephalic, atraumatic  Neck: supple, no lymphadenopathy  CV: Regular rate & regular rhythm  Lungs: decreased BS at bases, no fremitus  Abdomen: Soft, BS present  Ext: Warm, well perfused contusions bl le  Neuro: moves all extremities against resistance, no droop, awake  Skin: no rash, no erythema  
Subjective/Objective:     70 year old male admitted for SOB    MEDICATIONS  (STANDING):  aMIOdarone    Tablet 200 milliGRAM(s) Oral daily  apixaban 5 milliGRAM(s) Oral every 12 hours  atorvastatin 40 milliGRAM(s) Oral at bedtime  cefTRIAXone   IVPB      cefTRIAXone   IVPB 1000 milliGRAM(s) IV Intermittent every 24 hours  chlorhexidine 2% Cloths 1 Application(s) Topical <User Schedule>  clopidogrel Tablet 75 milliGRAM(s) Oral daily  dextrose 5%. 1000 milliLiter(s) (50 mL/Hr) IV Continuous <Continuous>  dextrose 5%. 1000 milliLiter(s) (100 mL/Hr) IV Continuous <Continuous>  dextrose 50% Injectable 25 Gram(s) IV Push once  dextrose 50% Injectable 12.5 Gram(s) IV Push once  dextrose 50% Injectable 25 Gram(s) IV Push once  ezetimibe 10 milliGRAM(s) Oral daily  fenofibrate Tablet 145 milliGRAM(s) Oral daily  furosemide   Injectable 40 milliGRAM(s) IV Push daily  furosemide   Injectable 40 milliGRAM(s) IV Push once  glucagon  Injectable 1 milliGRAM(s) IntraMuscular once  insulin glargine Injectable (LANTUS) 25 Unit(s) SubCutaneous at bedtime  insulin lispro (ADMELOG) corrective regimen sliding scale   SubCutaneous three times a day before meals  insulin lispro Injectable (ADMELOG) 5 Unit(s) SubCutaneous three times a day before meals  melatonin 10 milliGRAM(s) Oral at bedtime  metoprolol succinate ER 50 milliGRAM(s) Oral daily  nicotine -  14 mG/24Hr(s) Patch 1 Patch Transdermal daily  pantoprazole    Tablet 40 milliGRAM(s) Oral two times a day  sacubitril 24 mG/valsartan 26 mG 1 Tablet(s) Oral two times a day    MEDICATIONS  (PRN):  acetaminophen     Tablet .. 650 milliGRAM(s) Oral every 6 hours PRN Temp greater or equal to 38C (100.4F), Mild Pain (1 - 3)  aluminum hydroxide/magnesium hydroxide/simethicone Suspension 30 milliLiter(s) Oral every 6 hours PRN Dyspepsia  dextrose Oral Gel 15 Gram(s) Oral once PRN Blood Glucose LESS THAN 70 milliGRAM(s)/deciliter          Vital Signs Last 24 Hrs  T(C): 36.9 (29 Mar 2025 04:13), Max: 36.9 (29 Mar 2025 04:13)  T(F): 98.4 (29 Mar 2025 04:13), Max: 98.4 (29 Mar 2025 04:13)  HR: 65 (29 Mar 2025 04:13) (65 - 69)  BP: 155/87 (29 Mar 2025 04:13) (138/83 - 155/87)  BP(mean): --  RR: 18 (29 Mar 2025 04:13) (18 - 23)  SpO2: 92% (29 Mar 2025 04:13) (92% - 97%)    Parameters below as of 28 Mar 2025 20:30  Patient On (Oxygen Delivery Method): nasal cannula  O2 Flow (L/min): 2    I&O's Detail    28 Mar 2025 07:01  -  29 Mar 2025 07:00  --------------------------------------------------------  IN:  Total IN: 0 mL    OUT:    Voided (mL): 1400 mL  Total OUT: 1400 mL    Total NET: -1400 mL      29 Mar 2025 07:01  -  29 Mar 2025 10:59  --------------------------------------------------------  IN:  Total IN: 0 mL    OUT:    Voided (mL): 420 mL  Total OUT: 420 mL    Total NET: -420 mL                EKG/ TELEM:    LABS:                          13.0   16.40 )-----------( 399      ( 29 Mar 2025 06:23 )             40.1       29 Mar 2025 06:23    138    |  104    |  20     ----------------------------<  217[H]  4.3     |  20     |  1.3    28 Mar 2025 06:08    138    |  103    |  18     ----------------------------<  110[H]  4.6     |  20     |  1.4      Ca    9.6        29 Mar 2025 06:23  Ca    9.4        28 Mar 2025 06:08  Mg     2.0       28 Mar 2025 06:08    TPro  6.5    /  Alb  3.5    /  TBili  0.6    /  DBili  x      /  AST  14     /  ALT  13     /  AlkPhos  93     29 Mar 2025 06:23  TPro  6.8    /  Alb  3.9    /  TBili  0.4    /  DBili  x      /  AST  26     /  ALT  17     /  AlkPhos  100    28 Mar 2025 06:08                      Diagnostic testing:  < from: TTE Echo Complete w/ Contrast w/ Doppler (01.15.25 @ 11:15) >  Summary:   1. Mildly to moderately decreased global left ventricular systolic   function.   2. LV Ejection Fraction by Beck's Method with a biplane EF of 43 %.   3. Mildly increased LV wall thickness.   4. The left ventricular diastolic function could not be assessed in this   study.   5. Mildly enlarged right ventricle.   6. Moderately enlarged left atrium.   7. Normal right atrial size.   8. Mild mitral valve regurgitation.   9. Mild thickening of the anterior and posterior mitral valve leaflets.  10. Mild tricuspid regurgitation.  11. Bioprosthetic aortic valve was not well visualized. Mean PG 16 mmHg,   RADHA 1.3 cm^2.  12. Estimated pulmonary artery systolic pressure is 41.2 mmHg assuming a   right atrial pressure of 8 mmHg, which is consistent with mild pulmonary   hypertension.  13. There is no evidence of pericardial effusion.    PHYSICIAN INTERPRETATION:  Left Ventricle: The left ventricular internal cavity size is normal. Left   ventricular wall thickness is mildly increased. Global LV systolic   function was mildly to moderately decreased. The left ventricular   diastolic function could not be assessed in this study.  Right Ventricle: The right ventricular size is mildly enlarged. RV   systolic function is normal.  Left Atrium: Moderately enlarged left atrium.  Right Atrium: Normal right atrial size.  Pericardium: There is no evidence of pericardial effusion.  Mitral Valve: Mild thickening of the anterior and posterior mitral valve   leaflets. Mild mitral valve regurgitation is seen.  Tricuspid Valve: The tricuspid valve is normal in structure. Mild   tricuspid regurgitation is visualized. Estimated pulmonary artery   systolic pressure is 41.2 mmHg assuming a right atrial pressure of 8   mmHg, which is consistent with mild pulmonary hypertension.  Aortic Valve: No evidence of aortic valve regurgitation is seen.   Bioprosthetic aortic valve was not well visualized. Mean PG 16 mmHg, RADHA   1.3 cm^2.  Pulmonic Valve: Structurally normal pulmonic valve, with normal leaflet   excursion. No indication of pulmonic valve regurgitation.  Aorta: The aortic root is normal in size and structure.  Pulmonary Artery: The pulmonary artery is of normal size and origin.  Venous: The inferior vena cava was normal sized, with respiratory size   variation less than 50%.    < end of copied text >        Assessment and Plan:        
DATE OF SERVICE: 03-31-25    Patient denies chest pain or shortness of breath.   Review of symptoms otherwise negative.    acetaminophen     Tablet .. 650 milliGRAM(s) Oral every 6 hours PRN  aluminum hydroxide/magnesium hydroxide/simethicone Suspension 30 milliLiter(s) Oral every 6 hours PRN  aMIOdarone    Tablet 200 milliGRAM(s) Oral daily  apixaban 5 milliGRAM(s) Oral every 12 hours  atorvastatin 40 milliGRAM(s) Oral at bedtime  cefTRIAXone   IVPB      cefTRIAXone   IVPB 1000 milliGRAM(s) IV Intermittent every 24 hours  chlorhexidine 2% Cloths 1 Application(s) Topical <User Schedule>  clopidogrel Tablet 75 milliGRAM(s) Oral daily  dextrose 5%. 1000 milliLiter(s) IV Continuous <Continuous>  dextrose 5%. 1000 milliLiter(s) IV Continuous <Continuous>  dextrose 50% Injectable 25 Gram(s) IV Push once  dextrose 50% Injectable 12.5 Gram(s) IV Push once  dextrose 50% Injectable 25 Gram(s) IV Push once  dextrose Oral Gel 15 Gram(s) Oral once PRN  ezetimibe 10 milliGRAM(s) Oral daily  fenofibrate Tablet 145 milliGRAM(s) Oral daily  furosemide   Injectable 40 milliGRAM(s) IV Push daily  glucagon  Injectable 1 milliGRAM(s) IntraMuscular once  guaifenesin/dextromethorphan Oral Liquid 10 milliLiter(s) Oral every 6 hours PRN  insulin glargine Injectable (LANTUS) 25 Unit(s) SubCutaneous at bedtime  insulin lispro (ADMELOG) corrective regimen sliding scale   SubCutaneous three times a day before meals  insulin lispro Injectable (ADMELOG) 5 Unit(s) SubCutaneous three times a day before meals  melatonin 10 milliGRAM(s) Oral at bedtime  metoprolol succinate ER 50 milliGRAM(s) Oral daily  nicotine -  14 mG/24Hr(s) Patch 1 Patch Transdermal daily  pantoprazole    Tablet 40 milliGRAM(s) Oral two times a day  sacubitril 24 mG/valsartan 26 mG 1 Tablet(s) Oral two times a day                            13.7   18.47 )-----------( 417      ( 31 Mar 2025 07:21 )             42.4       Hemoglobin: 13.7 g/dL (03-31 @ 07:21)  Hemoglobin: 13.0 g/dL (03-30 @ 06:05)  Hemoglobin: 13.0 g/dL (03-29 @ 06:23)  Hemoglobin: 13.4 g/dL (03-28 @ 06:08)      03-31    141  |  101  |  17  ----------------------------<  209[H]  3.5   |  28  |  1.2    Ca    9.0      31 Mar 2025 07:21  Phos  3.1     03-31  Mg     2.1     03-31      Creatinine Trend: 1.2<--, 1.2<--, 1.3<--, 1.4<--    COAGS:         GENERAL:  71y/o Male NAD, resting comfortably.  HEAD:  Atraumatic, Normocephalic  EYES: EOMI, PERRLA, conjunctiva and sclera clear  NECK: Supple, No JVD, no cervical lymphadenopathy, non-tender  CHEST/LUNG: Decreased B/L BS  HEART: Regular rate and rhythm; S1&S2  ABDOMEN: Soft, Nontender, Nondistended x 4 quadrants; Bowel sounds present  EXTREMITIES:   Peripheral Pulses Present, No clubbing, no cyanosis, or no edema, no calf tenderness  PSYCH: AAOx3, cooperative, appropriate  NEUROLOGY: WNL  SKIN: WNL    T(C): 36.8 (03-31-25 @ 04:30), Max: 36.9 (03-30-25 @ 20:04)  HR: 95 (03-31-25 @ 04:30) (89 - 95)  BP: 147/71 (03-31-25 @ 04:30) (128/67 - 147/71)  RR: 18 (03-31-25 @ 04:30) (16 - 18)  SpO2: 98% (03-31-25 @ 04:30) (93% - 98%)  Wt(kg): --    I&O's Summary    30 Mar 2025 07:01  -  31 Mar 2025 07:00  --------------------------------------------------------  IN: 0 mL / OUT: 900 mL / NET: -900 mL      Assessment/ Plan:  71 y/o male with pmhx of HFrEF 43 % , AICD, AVR, hypertension, paroxysmal A-fib status post ablation 1/14/24, CAD s/p PCI , diabetes, hyperlipidemia, COPD not on home O2 , current smoker, frequent  falls  here for assessment of dyspnea and chest pain.      - TTE: EF 43%/ mild-mod decreased global LVSF  - CXR- New bilateral opacities    Impression:  # HFmrEF exacerbation   - pt presented with cough and congestion, sob possibly multifactorial  - currently has 1:1 sitter for SI  - he has signs of volume overload on exam  - continue with IV Diuresis for now Keep O>I, possible transition to po tomorrow  - Strict I/Os and standing daily weights  - Fluid restriction   - Trend BMP 2/2 diuresis and replete as needed, k>4, mg> 2  - Clinical status improving, still with +cough
LENGTH OF HOSPITAL STAY: 3d    CHIEF COMPLAINT:    Patient is a 70y old  Male who presents with a chief complaint of chf exacerbation (30 Mar 2025 10:18)      OVERNIGHT EVENTS:    - tele reviewed by me paced no some pvcs  - no overnight events   - pt examined at bedside, discussed plan and answered questions  - tolerating PO, having BMs, making urine without urinary sxs    FOLLOW UP:    - clinical improvement    HPI:    A 71 y/o male with pmhx of HFrEF 43 % , AICD, AVR, hypertension, paroxysmal A-fib status post ablation 1/14/24, CAD s/p PCI , diabetes, hyperlipidemia, COPD not on home O2 , current smoker, frequent  falls  here for assessment of dyspnea. Pt reports shortness of breath x 1 week. Pt reports shortness of breath with lying down and within the house. Pt reports compliant with torsemide. Pt reports LE edema. Pt denies cough or chest pain. Pt reports fell 1 x week ago , hit his L knee has pain with ambulation. Pt poor historian. PT does not know his cardiologist. Pt reports meds as per EMR as discharged recently no changes. Pt denies fever, chills, urinary symptoms, diarrhea.   [facility] 87% at home, was on O2 en route, in ED 98% on RA.     (28 Mar 2025 09:54)      ALLERGIES:    Bactrim (Unknown)    PMHx:    CHF (congestive heart failure)  Diabetes  CAD (coronary artery disease)  Chronic obstructive pulmonary disease (COPD)  HTN (hypertension)  Stented coronary artery  Dyslipidemia  GERD (gastroesophageal reflux disease)  Afib  CVA (cerebral vascular accident)  H/O heart artery stent  Heart valve replaced  aortic  History of Nissen fundoplication    MEDICATIONS:  STANDING MEDICATIONS  aMIOdarone    Tablet 200 milliGRAM(s) Oral daily  apixaban 5 milliGRAM(s) Oral every 12 hours  atorvastatin 40 milliGRAM(s) Oral at bedtime  cefTRIAXone   IVPB      cefTRIAXone   IVPB 1000 milliGRAM(s) IV Intermittent every 24 hours  chlorhexidine 2% Cloths 1 Application(s) Topical <User Schedule>  clopidogrel Tablet 75 milliGRAM(s) Oral daily  dextrose 5%. 1000 milliLiter(s) IV Continuous <Continuous>  dextrose 5%. 1000 milliLiter(s) IV Continuous <Continuous>  dextrose 50% Injectable 25 Gram(s) IV Push once  dextrose 50% Injectable 12.5 Gram(s) IV Push once  dextrose 50% Injectable 25 Gram(s) IV Push once  ezetimibe 10 milliGRAM(s) Oral daily  fenofibrate Tablet 145 milliGRAM(s) Oral daily  furosemide   Injectable 40 milliGRAM(s) IV Push daily  glucagon  Injectable 1 milliGRAM(s) IntraMuscular once  insulin glargine Injectable (LANTUS) 25 Unit(s) SubCutaneous at bedtime  insulin lispro (ADMELOG) corrective regimen sliding scale   SubCutaneous three times a day before meals  insulin lispro Injectable (ADMELOG) 5 Unit(s) SubCutaneous three times a day before meals  melatonin 10 milliGRAM(s) Oral at bedtime  metoprolol succinate ER 50 milliGRAM(s) Oral daily  nicotine -  14 mG/24Hr(s) Patch 1 Patch Transdermal daily  pantoprazole    Tablet 40 milliGRAM(s) Oral two times a day  sacubitril 24 mG/valsartan 26 mG 1 Tablet(s) Oral two times a day    PRN MEDICATIONS  acetaminophen     Tablet .. 650 milliGRAM(s) Oral every 6 hours PRN  aluminum hydroxide/magnesium hydroxide/simethicone Suspension 30 milliLiter(s) Oral every 6 hours PRN  dextrose Oral Gel 15 Gram(s) Oral once PRN  guaifenesin/dextromethorphan Oral Liquid 10 milliLiter(s) Oral every 6 hours PRN      LABS:                        13.0   16.97 )-----------( 374      ( 30 Mar 2025 06:05 )             39.9     03-30    141  |  102  |  20  ----------------------------<  211[H]  3.7   |  25  |  1.2    Ca    9.3      30 Mar 2025 06:05  Phos  2.9     03-30  Mg     1.7     03-30    Urinalysis Basic - ( 30 Mar 2025 06:05 )    Color: x / Appearance: x / SG: x / pH: x  Gluc: 211 mg/dL / Ketone: x  / Bili: x / Urobili: x   Blood: x / Protein: x / Nitrite: x   Leuk Esterase: x / RBC: x / WBC x   Sq Epi: x / Non Sq Epi: x / Bacteria: x    Culture - Blood (collected 28 Mar 2025 20:45)  Source: Blood Blood-Peripheral  Preliminary Report (31 Mar 2025 03:02):  No growth at 48 Hours    VITALS:   T(F): 98.2  HR: 95  BP: 147/71  RR: 18  SpO2: 98%      PHYSICAL EXAM:    Gen: NAD, resting in bed thin frail  HEENT: Normocephalic, atraumatic  Neck: supple, no lymphadenopathy  CV: Regular rate & regular rhythm  Lungs: decreased BS at bases, no fremitus  Abdomen: Soft, BS present  Ext: Warm, well perfused contusions bl le  Neuro: moves all extremities against resistance, no droop, awake  Skin: no rash, no erythema

## 2025-04-01 NOTE — CONSULT NOTE ADULT - ASSESSMENT
71 y/o male with pmhx of HFrEF 43 % , AICD, AVR, hypertension, paroxysmal A-fib status post ablation 1/14/24, CAD s/p PCI , diabetes, hyperlipidemia, COPD not on home O2 , current smoker, frequent  falls  here for assessment of dyspnea and chest pain.      TTE: EF 43%/ mild-mod decreased global LVSF  CXR- New bilateral opacities    Impression:  # HFmrEF exacerbation   - Breathing improved now requiring 1L O2 via nc  - LE much improved.   - He got IV Lasix this morning continue with po tmrw  - Continue to wean off O2.  - Fluid restriction   - Trend BMP 2/2 diuresis and replete as needed, k>4, mg> 2  - outpatient cardiology f/u

## 2025-04-01 NOTE — CONSULT NOTE ADULT - SUBJECTIVE AND OBJECTIVE BOX
HISTORY OF PRESENT ILLNESS: HPI:  A 71 y/o male with pmhx of HFrEF 43 % , AICD, AVR, hypertension, paroxysmal A-fib status post ablation 1/14/24, CAD s/p PCI , diabetes, hyperlipidemia, COPD not on home O2 , current smoker, frequent  falls  here for assessment of dyspnea. Pt reports shortness of breath x 1 week. Pt reports shortness of breath with lying down and within the house. Pt reports compliant with torsemide. Pt reports LE edema. Pt denies cough or chest pain. Pt reports fell 1 x week ago , hit his L knee has pain with ambulation. Pt poor historian. PT does not know his cardiologist. Pt reports meds as per EMR as discharged recently no changes. Pt denies fever, chills, urinary symptoms, diarrhea.   [facility] O2 87% at home, was on O2 en route, in ED 98% on RA.     (28 Mar 2025 09:54)      PAST MEDICAL & SURGICAL HISTORY:  CHF (congestive heart failure)      Diabetes      CAD (coronary artery disease)      Chronic obstructive pulmonary disease (COPD)      HTN (hypertension)      Stented coronary artery      Dyslipidemia      GERD (gastroesophageal reflux disease)      Afib      CVA (cerebral vascular accident)      H/O heart artery stent      Heart valve replaced  aortic      History of Nissen fundoplication              MEDICATIONS:  MEDICATIONS  (STANDING):  aMIOdarone    Tablet 200 milliGRAM(s) Oral daily  apixaban 5 milliGRAM(s) Oral every 12 hours  atorvastatin 40 milliGRAM(s) Oral at bedtime  chlorhexidine 2% Cloths 1 Application(s) Topical <User Schedule>  clopidogrel Tablet 75 milliGRAM(s) Oral daily  dextrose 5%. 1000 milliLiter(s) (50 mL/Hr) IV Continuous <Continuous>  dextrose 5%. 1000 milliLiter(s) (100 mL/Hr) IV Continuous <Continuous>  dextrose 50% Injectable 25 Gram(s) IV Push once  dextrose 50% Injectable 12.5 Gram(s) IV Push once  dextrose 50% Injectable 25 Gram(s) IV Push once  ezetimibe 10 milliGRAM(s) Oral daily  famotidine    Tablet 20 milliGRAM(s) Oral daily  fenofibrate Tablet 145 milliGRAM(s) Oral daily  glucagon  Injectable 1 milliGRAM(s) IntraMuscular once  insulin glargine Injectable (LANTUS) 25 Unit(s) SubCutaneous at bedtime  insulin lispro (ADMELOG) corrective regimen sliding scale   SubCutaneous three times a day before meals  melatonin 10 milliGRAM(s) Oral at bedtime  metoprolol succinate ER 50 milliGRAM(s) Oral daily  nicotine -  14 mG/24Hr(s) Patch 1 Patch Transdermal daily  sacubitril 24 mG/valsartan 26 mG 1 Tablet(s) Oral two times a day      Allergies    sulfa drugs (Unknown)    Intolerances        FAMILY HISTORY:    Non-contributary for premature coronary disease or sudden cardiac death    SOCIAL HISTORY:    [ ] Non-smoker  [ ] Smoker  [ ] Alcohol      REVIEW OF SYSTEMS:  [x]chest pain   [x]shortness of breath  [  ]palpitations  [  ]syncope  [ ]near syncope [ ]upper extremity weakness   [ ] lower extremity weakness  [  ]diplopia  [  ]altered mental status   [  ]fevers  [ ]chills [ ]nausea  [ ]vomitting  [  ]dysphagia    [ ]abdominal pain  [ ]melena  [ ]BRBPR    [  ]epistaxis  [  ]rash    [x]lower extremity edema        [x ] All others negative	  [ ] Unable to obtain    PHYSICAL EXAM:  T(C): 36.6 (03-28-25 @ 14:15), Max: 36.8 (03-28-25 @ 05:58)  HR: 66 (03-28-25 @ 14:15) (66 - 77)  BP: 138/83 (03-28-25 @ 14:15) (131/73 - 140/85)  RR: 22 (03-28-25 @ 14:15) (18 - 24)  SpO2: 97% (03-28-25 @ 14:15) (88% - 99%)  Wt(kg): --  I&O's Summary    28 Mar 2025 07:01  -  28 Mar 2025 17:07  --------------------------------------------------------  IN: 0 mL / OUT: 300 mL / NET: -300 mL          HEENT:   Normal oral mucosa, PERRL, EOMI	  Lymphatic: No lymphadenopathy , + B/L LE edema  Cardiovascular: Normal S1 S2  Respiratory: B/L coarse/ crackles  Gastrointestinal:  Soft, Non-tender, + BS	  Skin: No rashes, No ecchymoses, No cyanosis, warm to touch  Musculoskeletal: Normal range of motion, normal strength        TELEMETRY: 	    ECG:   < from: 12 Lead ECG (03.28.25 @ 05:56) >  Diagnosis Line Paced ventricularbeats                Confirmed by JESSICA TIAN MD ([id number]) on 3/28/2025 7:10:21 AM    < end of copied text >   	  RADIOLOGY:  < from: Xray Chest 1 View- PORTABLE-Urgent (03.28.25 @ 06:12) >  IMPRESSION:    New bilateral opacities    --- End of Report ---          MALISSA BANG MD; ResidentRadiologist  This document has been electronically signed.  LAURA GARCIA MD; Attending Radiologist  This document has been electronically signed. Mar 28 2025  2:44PM    < end of copied text >    OTHER:     DIAGNOSTIC TESTING:  [ ] Echocardiogram:  < from: TTE Echo Complete w/ Contrast w/ Doppler (01.15.25 @ 11:15) >  Summary:   1. Mildly to moderately decreased global left ventricular systolic   function.   2. LV Ejection Fraction by Beck's Method with a biplane EF of 43 %.   3. Mildly increased LV wall thickness.   4. The left ventricular diastolic function could not be assessed in this   study.   5. Mildly enlarged right ventricle.   6. Moderately enlarged left atrium.   7. Normal right atrial size.   8. Mild mitral valve regurgitation.   9. Mild thickening of the anterior and posterior mitral valve leaflets.  10. Mild tricuspid regurgitation.  11. Bioprosthetic aortic valve was not well visualized. Mean PG 16 mmHg,   RADHA 1.3 cm^2.  12. Estimated pulmonary artery systolic pressure is 41.2 mmHg assuming a   right atrial pressure of 8 mmHg, which is consistent with mild pulmonary   hypertension.  13. There is no evidence of pericardial effusion.    < end of copied text >    [ ]  Catheterization:  < from: Cardiac Cath Lab - Adult (06.15.21 @ 13:49) >  IMPRESSIONS: Hemodynamically significant mid LAD lesion AUC score 7. patent    stents in prox LAD and RCA      < end of copied text >    [ ] Stress Test:    	  	  LABS:	 	    CARDIAC MARKERS:  72, 62                            13.4   20.16 )-----------( 424      ( 28 Mar 2025 06:08 )             42.4     03-28    138  |  103  |  18  ----------------------------<  110[H]  4.6   |  20  |  1.4    Ca    9.4      28 Mar 2025 06:08  Mg     2.0     03-28    TPro  6.8  /  Alb  3.9  /  TBili  0.4  /  DBili  x   /  AST  26  /  ALT  17  /  AlkPhos  100  03-28    proBNP:   Lipid Profile:   HgA1c:   TSH:     ASSESSMENT/PLAN: 	70yMale    71 y/o male with pmhx of HFrEF 43 % , AICD, AVR, hypertension, paroxysmal A-fib status post ablation 1/14/24, CAD s/p PCI , diabetes, hyperlipidemia, COPD not on home O2 , current smoker, frequent  falls  here for assessment of dyspnea and chest pain.    Patient is crying and reports wanting to die.  He reports current sob and chest pain: dull, ache, constant.  Patient is currently being monitored on a 1:1 basis for suicidal ideation- seen by psych.      Impression:  #HFrEF  - Fluid overload on exam  - TTE: EF 43%/ mild-mod decreased global LVSF  - CXR- New bilateral opacities  #Elevated troponin  - Downtrending  - EKG: Diagnosis Line Paced ventricular beats  #Suicidal Ideation  - 1:1 for safety  - Psych f/u    Plan:  - Admit to telemetry  - Obtain TTE to evaluate for WMA/ valvular disease  - CPK  - Continue to diurese- Increase Lasix to 40mg IV Q12 hrs  - Strict I/Os and standing daily weights  - Trend BMP 2/2 diuresis and replete as needed, k>4, mg> 2  - Increase Entresto to 49mg/ 51mg po Q12 hrs    Discussed with Dr. Tian
Subjective/Objective:     70 year old male admitted for CHFexacerbation    MEDICATIONS  (STANDING):  aMIOdarone    Tablet 200 milliGRAM(s) Oral daily  apixaban 5 milliGRAM(s) Oral every 12 hours  atorvastatin 40 milliGRAM(s) Oral at bedtime  cefTRIAXone   IVPB      cefTRIAXone   IVPB 1000 milliGRAM(s) IV Intermittent every 24 hours  chlorhexidine 2% Cloths 1 Application(s) Topical <User Schedule>  clopidogrel Tablet 75 milliGRAM(s) Oral daily  dextrose 5%. 1000 milliLiter(s) (50 mL/Hr) IV Continuous <Continuous>  dextrose 5%. 1000 milliLiter(s) (100 mL/Hr) IV Continuous <Continuous>  dextrose 50% Injectable 25 Gram(s) IV Push once  dextrose 50% Injectable 12.5 Gram(s) IV Push once  dextrose 50% Injectable 25 Gram(s) IV Push once  ezetimibe 10 milliGRAM(s) Oral daily  fenofibrate Tablet 145 milliGRAM(s) Oral daily  furosemide   Injectable 40 milliGRAM(s) IV Push daily  glucagon  Injectable 1 milliGRAM(s) IntraMuscular once  insulin glargine Injectable (LANTUS) 25 Unit(s) SubCutaneous at bedtime  insulin lispro (ADMELOG) corrective regimen sliding scale   SubCutaneous three times a day before meals  insulin lispro Injectable (ADMELOG) 5 Unit(s) SubCutaneous three times a day before meals  melatonin 10 milliGRAM(s) Oral at bedtime  metoprolol succinate ER 50 milliGRAM(s) Oral daily  nicotine -  14 mG/24Hr(s) Patch 1 Patch Transdermal daily  pantoprazole    Tablet 40 milliGRAM(s) Oral two times a day  sacubitril 24 mG/valsartan 26 mG 1 Tablet(s) Oral two times a day    MEDICATIONS  (PRN):  acetaminophen     Tablet .. 650 milliGRAM(s) Oral every 6 hours PRN Temp greater or equal to 38C (100.4F), Mild Pain (1 - 3)  aluminum hydroxide/magnesium hydroxide/simethicone Suspension 30 milliLiter(s) Oral every 6 hours PRN Dyspepsia  dextrose Oral Gel 15 Gram(s) Oral once PRN Blood Glucose LESS THAN 70 milliGRAM(s)/deciliter  guaifenesin/dextromethorphan Oral Liquid 10 milliLiter(s) Oral every 6 hours PRN Cough          Vital Signs Last 24 Hrs  T(C): 36.8 (01 Apr 2025 04:30), Max: 36.8 (31 Mar 2025 14:21)  T(F): 98.2 (01 Apr 2025 04:30), Max: 98.3 (31 Mar 2025 14:21)  HR: 55 (01 Apr 2025 04:30) (55 - 103)  BP: 127/75 (01 Apr 2025 04:30) (100/68 - 131/91)  BP(mean): 92 (01 Apr 2025 04:30) (92 - 92)  RR: 18 (01 Apr 2025 04:30) (18 - 18)  SpO2: 97% (01 Apr 2025 04:30) (87% - 98%)    Parameters below as of 31 Mar 2025 14:54  Patient On (Oxygen Delivery Method): room air      I&O's Detail    31 Mar 2025 07:01  -  01 Apr 2025 07:00  --------------------------------------------------------  IN:    Oral Fluid: 651 mL  Total IN: 651 mL    OUT:    Incontinent per Condom Catheter (mL): 1100 mL    Voided (mL): 780 mL  Total OUT: 1880 mL    Total NET: -1229 mL          EKG/ TELEM:    LABS:                          13.9   12.68 )-----------( 426      ( 01 Apr 2025 06:43 )             43.1       31 Mar 2025 07:21    141    |  101    |  17     ----------------------------<  209[H]  3.5     |  28     |  1.2      Ca    9.0        31 Mar 2025 07:21  Phos  3.1       31 Mar 2025 07:21  Mg     2.1       31 Mar 2025 07:21                        Diagnostic testing:        Assessment and Plan:

## 2025-04-01 NOTE — CHART NOTE - NSCHARTNOTESELECT_GEN_ALL_CORE
Event Note
Event Note
Home O2/Event Note
PA Note/Event Note
Event Note
Hypomagnesemia/Event Note

## 2025-04-01 NOTE — CHART NOTE - NSCHARTNOTEFT_GEN_A_CORE
Despite IV Lasix patient desaturates.    - Room air pulse ox. at rest:  98%       - Room air pulse ox while ambulatin%    - Pulse ox while ambulating on 2liters n/c  O2 93%    Patient will require home O2 for discharge.  Patient is aware and agreeable to home O2.  Patient is in a chronic stable state of COPD and CHF      Patient treated for pneumonia: No

## 2025-04-02 ENCOUNTER — TRANSCRIPTION ENCOUNTER (OUTPATIENT)
Age: 70
End: 2025-04-02

## 2025-04-02 VITALS — OXYGEN SATURATION: 97 %

## 2025-04-02 LAB — GLUCOSE BLDC GLUCOMTR-MCNC: 172 MG/DL — HIGH (ref 70–99)

## 2025-04-02 PROCEDURE — 99239 HOSP IP/OBS DSCHRG MGMT >30: CPT

## 2025-04-02 RX ADMIN — AMIODARONE HYDROCHLORIDE 200 MILLIGRAM(S): 50 INJECTION, SOLUTION INTRAVENOUS at 05:21

## 2025-04-02 RX ADMIN — METOPROLOL SUCCINATE 50 MILLIGRAM(S): 50 TABLET, EXTENDED RELEASE ORAL at 05:21

## 2025-04-02 RX ADMIN — INSULIN LISPRO 2: 100 INJECTION, SOLUTION INTRAVENOUS; SUBCUTANEOUS at 07:57

## 2025-04-02 RX ADMIN — APIXABAN 5 MILLIGRAM(S): 2.5 TABLET, FILM COATED ORAL at 05:20

## 2025-04-02 RX ADMIN — Medication 40 MILLIGRAM(S): at 05:21

## 2025-04-02 RX ADMIN — SACUBITRIL AND VALSARTAN 1 TABLET(S): 6; 6 PELLET ORAL at 05:25

## 2025-04-02 RX ADMIN — INSULIN LISPRO 5 UNIT(S): 100 INJECTION, SOLUTION INTRAVENOUS; SUBCUTANEOUS at 07:57

## 2025-04-02 RX ADMIN — NICOTINE POLACRILEX 1 PATCH: 4 GUM, CHEWING ORAL at 07:41

## 2025-04-02 NOTE — DISCHARGE NOTE NURSING/CASE MANAGEMENT/SOCIAL WORK - FINANCIAL ASSISTANCE
[facility] provides services at a reduced cost to those who are determined to be eligible through [facility]’s financial assistance program. Information regarding [facility]’s financial assistance program can be found by going to [email or web address] or by calling [phone].

## 2025-04-02 NOTE — DISCHARGE NOTE NURSING/CASE MANAGEMENT/SOCIAL WORK - PATIENT PORTAL LINK FT
You can access the FollowMyHealth Patient Portal offered by [facility] by registering at the following website: [email or web address]. By joining "[facility]"’s FollowMyHealth portal, you will also be able to view your health information using other applications (apps) compatible with our system.

## 2025-04-02 NOTE — DISCHARGE NOTE NURSING/CASE MANAGEMENT/SOCIAL WORK - NSDCPEFALRISK_GEN_ALL_CORE
For information on Fall & Injury Prevention, visit: [email or web address] OR  [email or web address] OR  https://www.cdc.gov/steadi/patient.html

## 2025-04-03 LAB
CULTURE RESULTS: SIGNIFICANT CHANGE UP
SPECIMEN SOURCE: SIGNIFICANT CHANGE UP

## 2025-04-08 DIAGNOSIS — Z79.85 LONG-TERM (CURRENT) USE OF INJECTABLE NON-INSULIN ANTIDIABETIC DRUGS: ICD-10-CM

## 2025-04-08 DIAGNOSIS — R45.851 SUICIDAL IDEATIONS: ICD-10-CM

## 2025-04-08 DIAGNOSIS — Z78.1 PHYSICAL RESTRAINT STATUS: ICD-10-CM

## 2025-04-08 DIAGNOSIS — I48.0 PAROXYSMAL ATRIAL FIBRILLATION: ICD-10-CM

## 2025-04-08 DIAGNOSIS — I25.10 ATHEROSCLEROTIC HEART DISEASE OF NATIVE CORONARY ARTERY WITHOUT ANGINA PECTORIS: ICD-10-CM

## 2025-04-08 DIAGNOSIS — F32.9 MAJOR DEPRESSIVE DISORDER, SINGLE EPISODE, UNSPECIFIED: ICD-10-CM

## 2025-04-08 DIAGNOSIS — I24.89 OTHER FORMS OF ACUTE ISCHEMIC HEART DISEASE: ICD-10-CM

## 2025-04-08 DIAGNOSIS — I11.0 HYPERTENSIVE HEART DISEASE WITH HEART FAILURE: ICD-10-CM

## 2025-04-08 DIAGNOSIS — Z91.81 HISTORY OF FALLING: ICD-10-CM

## 2025-04-08 DIAGNOSIS — Z88.2 ALLERGY STATUS TO SULFONAMIDES: ICD-10-CM

## 2025-04-08 DIAGNOSIS — Z79.4 LONG TERM (CURRENT) USE OF INSULIN: ICD-10-CM

## 2025-04-08 DIAGNOSIS — F17.210 NICOTINE DEPENDENCE, CIGARETTES, UNCOMPLICATED: ICD-10-CM

## 2025-04-08 DIAGNOSIS — E78.5 HYPERLIPIDEMIA, UNSPECIFIED: ICD-10-CM

## 2025-04-08 DIAGNOSIS — Z95.810 PRESENCE OF AUTOMATIC (IMPLANTABLE) CARDIAC DEFIBRILLATOR: ICD-10-CM

## 2025-04-08 DIAGNOSIS — J44.9 CHRONIC OBSTRUCTIVE PULMONARY DISEASE, UNSPECIFIED: ICD-10-CM

## 2025-04-08 DIAGNOSIS — E11.22 TYPE 2 DIABETES MELLITUS WITH DIABETIC CHRONIC KIDNEY DISEASE: ICD-10-CM

## 2025-04-08 DIAGNOSIS — Z79.01 LONG TERM (CURRENT) USE OF ANTICOAGULANTS: ICD-10-CM

## 2025-04-08 DIAGNOSIS — K21.9 GASTRO-ESOPHAGEAL REFLUX DISEASE WITHOUT ESOPHAGITIS: ICD-10-CM

## 2025-04-08 DIAGNOSIS — Z95.5 PRESENCE OF CORONARY ANGIOPLASTY IMPLANT AND GRAFT: ICD-10-CM

## 2025-04-08 DIAGNOSIS — Z79.84 LONG TERM (CURRENT) USE OF ORAL HYPOGLYCEMIC DRUGS: ICD-10-CM

## 2025-04-08 DIAGNOSIS — F05 DELIRIUM DUE TO KNOWN PHYSIOLOGICAL CONDITION: ICD-10-CM

## 2025-04-08 DIAGNOSIS — I50.23 ACUTE ON CHRONIC SYSTOLIC (CONGESTIVE) HEART FAILURE: ICD-10-CM

## 2025-04-14 ENCOUNTER — INPATIENT (INPATIENT)
Facility: HOSPITAL | Age: 70
LOS: 0 days | Discharge: LEFT BEFORE DISCHARGE | DRG: 293 | End: 2025-04-14
Attending: INTERNAL MEDICINE | Admitting: INTERNAL MEDICINE
Payer: MEDICARE

## 2025-04-14 ENCOUNTER — TRANSCRIPTION ENCOUNTER (OUTPATIENT)
Age: 70
End: 2025-04-14

## 2025-04-14 VITALS
OXYGEN SATURATION: 96 % | HEART RATE: 135 BPM | SYSTOLIC BLOOD PRESSURE: 115 MMHG | DIASTOLIC BLOOD PRESSURE: 80 MMHG | RESPIRATION RATE: 20 BRPM

## 2025-04-14 VITALS
TEMPERATURE: 97 F | RESPIRATION RATE: 20 BRPM | SYSTOLIC BLOOD PRESSURE: 135 MMHG | OXYGEN SATURATION: 95 % | DIASTOLIC BLOOD PRESSURE: 88 MMHG | HEART RATE: 129 BPM | WEIGHT: 156.97 LBS

## 2025-04-14 DIAGNOSIS — Z95.2 PRESENCE OF PROSTHETIC HEART VALVE: Chronic | ICD-10-CM

## 2025-04-14 DIAGNOSIS — Z98.890 OTHER SPECIFIED POSTPROCEDURAL STATES: Chronic | ICD-10-CM

## 2025-04-14 DIAGNOSIS — Z95.5 PRESENCE OF CORONARY ANGIOPLASTY IMPLANT AND GRAFT: Chronic | ICD-10-CM

## 2025-04-14 DIAGNOSIS — I50.9 HEART FAILURE, UNSPECIFIED: ICD-10-CM

## 2025-04-14 LAB
ALBUMIN SERPL ELPH-MCNC: 3.7 G/DL — SIGNIFICANT CHANGE UP (ref 3.5–5.2)
ALP SERPL-CCNC: 95 U/L — SIGNIFICANT CHANGE UP (ref 30–115)
ALT FLD-CCNC: 14 U/L — SIGNIFICANT CHANGE UP (ref 0–41)
ANION GAP SERPL CALC-SCNC: 12 MMOL/L — SIGNIFICANT CHANGE UP (ref 7–14)
AST SERPL-CCNC: 14 U/L — SIGNIFICANT CHANGE UP (ref 0–41)
BASE EXCESS BLDV CALC-SCNC: 6.2 MMOL/L — HIGH (ref -2–3)
BASOPHILS # BLD AUTO: 0.14 K/UL — SIGNIFICANT CHANGE UP (ref 0–0.2)
BASOPHILS NFR BLD AUTO: 1 % — SIGNIFICANT CHANGE UP (ref 0–1)
BILIRUB SERPL-MCNC: 0.3 MG/DL — SIGNIFICANT CHANGE UP (ref 0.2–1.2)
BUN SERPL-MCNC: 19 MG/DL — SIGNIFICANT CHANGE UP (ref 10–20)
CA-I SERPL-SCNC: 1.21 MMOL/L — SIGNIFICANT CHANGE UP (ref 1.15–1.33)
CALCIUM SERPL-MCNC: 9.8 MG/DL — SIGNIFICANT CHANGE UP (ref 8.4–10.5)
CHLORIDE SERPL-SCNC: 103 MMOL/L — SIGNIFICANT CHANGE UP (ref 98–110)
CO2 SERPL-SCNC: 27 MMOL/L — SIGNIFICANT CHANGE UP (ref 17–32)
CREAT SERPL-MCNC: 1.3 MG/DL — SIGNIFICANT CHANGE UP (ref 0.7–1.5)
EGFR: 59 ML/MIN/1.73M2 — LOW
EGFR: 59 ML/MIN/1.73M2 — LOW
EOSINOPHIL # BLD AUTO: 0.46 K/UL — SIGNIFICANT CHANGE UP (ref 0–0.7)
EOSINOPHIL NFR BLD AUTO: 3.4 % — SIGNIFICANT CHANGE UP (ref 0–8)
FLUAV AG NPH QL: SIGNIFICANT CHANGE UP
FLUBV AG NPH QL: SIGNIFICANT CHANGE UP
GAS PNL BLDV: 135 MMOL/L — LOW (ref 136–145)
GAS PNL BLDV: SIGNIFICANT CHANGE UP
GAS PNL BLDV: SIGNIFICANT CHANGE UP
GLUCOSE SERPL-MCNC: 148 MG/DL — HIGH (ref 70–99)
HCO3 BLDV-SCNC: 33 MMOL/L — HIGH (ref 22–29)
HCT VFR BLD CALC: 40.1 % — LOW (ref 42–52)
HCT VFR BLDA CALC: 50 % — SIGNIFICANT CHANGE UP (ref 39–51)
HGB BLD CALC-MCNC: 16.5 G/DL — SIGNIFICANT CHANGE UP (ref 12.6–17.4)
HGB BLD-MCNC: 12.5 G/DL — LOW (ref 14–18)
IMM GRANULOCYTES NFR BLD AUTO: 0.5 % — HIGH (ref 0.1–0.3)
LACTATE BLDV-MCNC: 2 MMOL/L — SIGNIFICANT CHANGE UP (ref 0.5–2)
LYMPHOCYTES # BLD AUTO: 1.87 K/UL — SIGNIFICANT CHANGE UP (ref 1.2–3.4)
LYMPHOCYTES # BLD AUTO: 14 % — LOW (ref 20.5–51.1)
MAGNESIUM SERPL-MCNC: 1.8 MG/DL — SIGNIFICANT CHANGE UP (ref 1.8–2.4)
MCHC RBC-ENTMCNC: 27.4 PG — SIGNIFICANT CHANGE UP (ref 27–31)
MCHC RBC-ENTMCNC: 31.2 G/DL — LOW (ref 32–37)
MCV RBC AUTO: 87.9 FL — SIGNIFICANT CHANGE UP (ref 80–94)
MONOCYTES # BLD AUTO: 0.95 K/UL — HIGH (ref 0.1–0.6)
MONOCYTES NFR BLD AUTO: 7.1 % — SIGNIFICANT CHANGE UP (ref 1.7–9.3)
NEUTROPHILS # BLD AUTO: 9.86 K/UL — HIGH (ref 1.4–6.5)
NEUTROPHILS NFR BLD AUTO: 74 % — SIGNIFICANT CHANGE UP (ref 42.2–75.2)
NRBC BLD AUTO-RTO: 0 /100 WBCS — SIGNIFICANT CHANGE UP (ref 0–0)
NT-PROBNP SERPL-SCNC: 2733 PG/ML — HIGH (ref 0–300)
PCO2 BLDV: 53 MMHG — SIGNIFICANT CHANGE UP (ref 42–55)
PH BLDV: 7.4 — SIGNIFICANT CHANGE UP (ref 7.32–7.43)
PLATELET # BLD AUTO: 448 K/UL — HIGH (ref 130–400)
PMV BLD: 10.5 FL — HIGH (ref 7.4–10.4)
PO2 BLDV: 17 MMHG — LOW (ref 25–45)
POTASSIUM BLDV-SCNC: 3.9 MMOL/L — SIGNIFICANT CHANGE UP (ref 3.5–5.1)
POTASSIUM SERPL-MCNC: 4.1 MMOL/L — SIGNIFICANT CHANGE UP (ref 3.5–5)
POTASSIUM SERPL-SCNC: 4.1 MMOL/L — SIGNIFICANT CHANGE UP (ref 3.5–5)
PROT SERPL-MCNC: 6.4 G/DL — SIGNIFICANT CHANGE UP (ref 6–8)
RBC # BLD: 4.56 M/UL — LOW (ref 4.7–6.1)
RBC # FLD: 13.7 % — SIGNIFICANT CHANGE UP (ref 11.5–14.5)
RSV RNA NPH QL NAA+NON-PROBE: SIGNIFICANT CHANGE UP
SAO2 % BLDV: 14.6 % — LOW (ref 67–88)
SARS-COV-2 RNA SPEC QL NAA+PROBE: SIGNIFICANT CHANGE UP
SODIUM SERPL-SCNC: 142 MMOL/L — SIGNIFICANT CHANGE UP (ref 135–146)
SOURCE RESPIRATORY: SIGNIFICANT CHANGE UP
TROPONIN T, HIGH SENSITIVITY RESULT: 125 NG/L — CRITICAL HIGH (ref 6–21)
TROPONIN T, HIGH SENSITIVITY RESULT: 131 NG/L — CRITICAL HIGH (ref 6–21)
WBC # BLD: 13.35 K/UL — HIGH (ref 4.8–10.8)
WBC # FLD AUTO: 13.35 K/UL — HIGH (ref 4.8–10.8)

## 2025-04-14 PROCEDURE — 99285 EMERGENCY DEPT VISIT HI MDM: CPT

## 2025-04-14 PROCEDURE — 99223 1ST HOSP IP/OBS HIGH 75: CPT

## 2025-04-14 PROCEDURE — 71045 X-RAY EXAM CHEST 1 VIEW: CPT | Mod: 26

## 2025-04-14 PROCEDURE — 76604 US EXAM CHEST: CPT | Mod: 26

## 2025-04-14 RX ORDER — INSULIN GLARGINE-YFGN 100 [IU]/ML
25 INJECTION, SOLUTION SUBCUTANEOUS AT BEDTIME
Refills: 0 | Status: DISCONTINUED | OUTPATIENT
Start: 2025-04-14 | End: 2025-04-14

## 2025-04-14 RX ORDER — DEXTROSE 50 % IN WATER 50 %
25 SYRINGE (ML) INTRAVENOUS ONCE
Refills: 0 | Status: DISCONTINUED | OUTPATIENT
Start: 2025-04-14 | End: 2025-04-14

## 2025-04-14 RX ORDER — INSULIN LISPRO 100 U/ML
INJECTION, SOLUTION INTRAVENOUS; SUBCUTANEOUS
Refills: 0 | Status: DISCONTINUED | OUTPATIENT
Start: 2025-04-14 | End: 2025-04-14

## 2025-04-14 RX ORDER — SACUBITRIL AND VALSARTAN 6; 6 MG/1; MG/1
1 PELLET ORAL
Refills: 0 | Status: DISCONTINUED | OUTPATIENT
Start: 2025-04-14 | End: 2025-04-14

## 2025-04-14 RX ORDER — ENOXAPARIN SODIUM 100 MG/ML
70 INJECTION SUBCUTANEOUS ONCE
Refills: 0 | Status: COMPLETED | OUTPATIENT
Start: 2025-04-14 | End: 2025-04-14

## 2025-04-14 RX ORDER — FUROSEMIDE 10 MG/ML
40 INJECTION INTRAMUSCULAR; INTRAVENOUS DAILY
Refills: 0 | Status: DISCONTINUED | OUTPATIENT
Start: 2025-04-15 | End: 2025-04-14

## 2025-04-14 RX ORDER — SODIUM CHLORIDE 9 G/1000ML
1000 INJECTION, SOLUTION INTRAVENOUS
Refills: 0 | Status: DISCONTINUED | OUTPATIENT
Start: 2025-04-14 | End: 2025-04-14

## 2025-04-14 RX ORDER — AMIODARONE HYDROCHLORIDE 50 MG/ML
200 INJECTION, SOLUTION INTRAVENOUS DAILY
Refills: 0 | Status: DISCONTINUED | OUTPATIENT
Start: 2025-04-14 | End: 2025-04-14

## 2025-04-14 RX ORDER — ATORVASTATIN CALCIUM 80 MG/1
40 TABLET, FILM COATED ORAL AT BEDTIME
Refills: 0 | Status: DISCONTINUED | OUTPATIENT
Start: 2025-04-14 | End: 2025-04-14

## 2025-04-14 RX ORDER — CLOPIDOGREL BISULFATE 75 MG/1
75 TABLET, FILM COATED ORAL DAILY
Refills: 0 | Status: DISCONTINUED | OUTPATIENT
Start: 2025-04-14 | End: 2025-04-14

## 2025-04-14 RX ORDER — APIXABAN 2.5 MG/1
5 TABLET, FILM COATED ORAL
Refills: 0 | Status: DISCONTINUED | OUTPATIENT
Start: 2025-04-15 | End: 2025-04-14

## 2025-04-14 RX ORDER — METOPROLOL SUCCINATE 50 MG/1
5 TABLET, EXTENDED RELEASE ORAL ONCE
Refills: 0 | Status: COMPLETED | OUTPATIENT
Start: 2025-04-14 | End: 2025-04-14

## 2025-04-14 RX ORDER — METOPROLOL SUCCINATE 50 MG/1
5 TABLET, EXTENDED RELEASE ORAL EVERY 6 HOURS
Refills: 0 | Status: DISCONTINUED | OUTPATIENT
Start: 2025-04-14 | End: 2025-04-14

## 2025-04-14 RX ORDER — ASPIRIN 325 MG
324 TABLET ORAL ONCE
Refills: 0 | Status: COMPLETED | OUTPATIENT
Start: 2025-04-14 | End: 2025-04-14

## 2025-04-14 RX ORDER — METOPROLOL SUCCINATE 50 MG/1
50 TABLET, EXTENDED RELEASE ORAL DAILY
Refills: 0 | Status: DISCONTINUED | OUTPATIENT
Start: 2025-04-15 | End: 2025-04-14

## 2025-04-14 RX ORDER — METOPROLOL SUCCINATE 50 MG/1
50 TABLET, EXTENDED RELEASE ORAL DAILY
Refills: 0 | Status: DISCONTINUED | OUTPATIENT
Start: 2025-04-14 | End: 2025-04-14

## 2025-04-14 RX ORDER — DEXTROSE 50 % IN WATER 50 %
15 SYRINGE (ML) INTRAVENOUS ONCE
Refills: 0 | Status: DISCONTINUED | OUTPATIENT
Start: 2025-04-14 | End: 2025-04-14

## 2025-04-14 RX ORDER — ACETAMINOPHEN 500 MG/5ML
650 LIQUID (ML) ORAL EVERY 6 HOURS
Refills: 0 | Status: DISCONTINUED | OUTPATIENT
Start: 2025-04-14 | End: 2025-04-14

## 2025-04-14 RX ORDER — FENOFIBRATE 160 MG/1
145 TABLET ORAL DAILY
Refills: 0 | Status: DISCONTINUED | OUTPATIENT
Start: 2025-04-14 | End: 2025-04-14

## 2025-04-14 RX ORDER — DEXTROSE 50 % IN WATER 50 %
12.5 SYRINGE (ML) INTRAVENOUS ONCE
Refills: 0 | Status: DISCONTINUED | OUTPATIENT
Start: 2025-04-14 | End: 2025-04-14

## 2025-04-14 RX ORDER — FUROSEMIDE 10 MG/ML
40 INJECTION INTRAMUSCULAR; INTRAVENOUS ONCE
Refills: 0 | Status: COMPLETED | OUTPATIENT
Start: 2025-04-14 | End: 2025-04-14

## 2025-04-14 RX ORDER — GLUCAGON 3 MG/1
1 POWDER NASAL ONCE
Refills: 0 | Status: DISCONTINUED | OUTPATIENT
Start: 2025-04-14 | End: 2025-04-14

## 2025-04-14 RX ADMIN — METOPROLOL SUCCINATE 5 MILLIGRAM(S): 50 TABLET, EXTENDED RELEASE ORAL at 17:07

## 2025-04-14 RX ADMIN — FUROSEMIDE 40 MILLIGRAM(S): 10 INJECTION INTRAMUSCULAR; INTRAVENOUS at 15:37

## 2025-04-14 RX ADMIN — ENOXAPARIN SODIUM 70 MILLIGRAM(S): 100 INJECTION SUBCUTANEOUS at 16:21

## 2025-04-14 RX ADMIN — Medication 324 MILLIGRAM(S): at 16:21

## 2025-04-14 NOTE — ED PROVIDER NOTE - PRIOR OUTPATIENT RADIOLOGY SUMMARY
echo 1/2025  1. Mildly to moderately decreased global left ventricular systolic function.   2. LV Ejection Fraction by Beck's Method with a biplane EF of 43 %.

## 2025-04-14 NOTE — ED PROVIDER NOTE - CLINICAL SUMMARY MEDICAL DECISION MAKING FREE TEXT BOX
pt has exacerbation of CHF partially stabilized with ED tx.  Patient will be admitted to a monitored setting. In my opinion, inpatient treatment is medically justifiable and appropriate.

## 2025-04-14 NOTE — H&P ADULT - NSHPPHYSICALEXAM_GEN_ALL_CORE
VITALS:   T(C): 36.3 (04-14-25 @ 14:45), Max: 36.3 (04-14-25 @ 14:45)  HR: 135 (04-14-25 @ 17:06) (129 - 136)  BP: 115/80 (04-14-25 @ 17:06) (115/80 - 135/88)  RR: 20 (04-14-25 @ 17:06) (20 - 20)  SpO2: 96% (04-14-25 @ 17:06) (95% - 96%)    GENERAL: NAD, lying in bed comfortably  HEAD:  Atraumatic, Normocephalic  EYES: EOMI, PERRLA, conjunctiva and sclera clear  ENT: Moist mucous membranes  NECK: Supple, No JVD  CHEST/LUNG: + rales, no wheezing, or rubs, on NC  HEART: irregularly irregular rate and rhythm; rubs, or gallops  ABDOMEN: Bowel sounds present; Soft, Nontender, Nondistended  : no suprapubic tenderness  EXTREMITIES:  2+ pitting edema bilaterally  NERVOUS SYSTEM:  Alert & Oriented X3, speech clear  SKIN: No rashes or lesions

## 2025-04-14 NOTE — DISCHARGE NOTE PROVIDER - CARE PROVIDER_API CALL
Martin Shaver  Internal Medicine  [address] [place] [id number]  Phone: [phone]  Fax: [phone]  Follow Up Time: 1-3 days

## 2025-04-14 NOTE — DISCHARGE NOTE PROVIDER - HOSPITAL COURSE
69 y/o male with pmhx of HFrEF 43 % , AICD, AVR, hypertension, paroxysmal A-fib (status post ablation 1/14/24), CAD s/p PCI , diabetes, hyperlipidemia, COPD (on 2L home O2), frequent  falls, recent admission for chf exacerbation presents to ED c/o SOB since this morning. Pt also admits to worsening LESLIE that required increased oxygen use.     patient admitted with chf exacerbation and afib with rvr. noted to have increased troponins. Patient requesting to leave against medical advice now that he is feeling better. Patient reports that he has all his home medications and equipment including oxygen at home. Patient aware of the risks up to and including death. Patient encouraged to seek medical attention at this hospital or any other if symptoms worsen or return. Patient should otherwise follow up with his PCP and cardiology at the earliest possible appointment.

## 2025-04-14 NOTE — DISCHARGE NOTE PROVIDER - NSDCFUSCHEDAPPT_GEN_ALL_CORE_FT
[facility]  CARDIOLOGY [address] [facility]  Scheduled Appointment: 05/22/2025

## 2025-04-14 NOTE — CHART NOTE - NSCHARTNOTEFT_GEN_A_CORE
Patient announces that he wants to leave now, as noted earlier he is feeling much better. Adds that he has oxygen and his medications at home. As noted on several chart entries today, he is alert and oriented

## 2025-04-14 NOTE — ED ADULT NURSE NOTE - NSFALLHARMRISKINTERV_ED_ALL_ED
Assistance OOB with selected safe patient handling equipment if applicable/Assistance with ambulation/Communicate risk of Fall with Harm to all staff, patient, and family/Monitor gait and stability/Provide visual cue: red socks, yellow wristband, yellow gown, etc/Reinforce activity limits and safety measures with patient and family/Bed in lowest position, wheels locked, appropriate side rails in place/Call bell, personal items and telephone in reach/Instruct patient to call for assistance before getting out of bed/chair/stretcher/Non-slip footwear applied when patient is off stretcher/[place] to call system/Physically safe environment - no spills, clutter or unnecessary equipment/Purposeful Proactive Rounding/Room/bathroom lighting operational, light cord in reach

## 2025-04-14 NOTE — ED PROVIDER NOTE - OBJECTIVE STATEMENT
71 yo M with PMHx of HTN, HLD, aFib s/p ablation, HFrEF s/p AICD, AVR, CAD s/p PCI, DM and COPD on 2L home O2, presents to the ED c/o worsening SOB since this morning. Pt has been complaint with his medications and oxygen. He denies other complaints. Pt denies fever, chills, nausea, vomiting, abdominal pain, diarrhea, headache, dizziness, weakness, chest pain, back pain, LOC, trauma, urinary symptoms, cough, calf pain, recent travel, recent surgery.

## 2025-04-14 NOTE — H&P ADULT - NSHPSOCIALHISTORY_GEN_ALL_CORE
If she's calling about the pleural fluid cytology, there are no signs of cancer.  Patient should follow up with Dr. Georges as he has seen her in the past.   Tobacco use: quit 3 weeks ago  EtOH use: denies  Illicit drug use: denies

## 2025-04-14 NOTE — ED ADULT TRIAGE NOTE - CHIEF COMPLAINT QUOTE
BIBA from home, patient c/o difficulty breathing since this morning. Patient has COPD and is oxygen dependent wears 2L NC

## 2025-04-14 NOTE — H&P ADULT - ASSESSMENT
71 y/o male with pmhx of HFrEF 43 % , AICD, AVR, hypertension, paroxysmal A-fib (status post ablation 1/14/24), CAD s/p PCI , diabetes, hyperlipidemia, COPD (on 2L home O2), frequent  falls, recent admission for chf exacerbation presents c/o SOB since this morning.    # Dyspnea likely 2/2  HFrEFexacerbation   # elevated troponin  # a-fib rvr  # Hx AICD, AVR, CAD s/p PCI  - ICU consulted: tele admission  - s/p lasix 40 mg in ED, will continue  - cardiology consult  - serial troponin 116 (56 last admission)  - obtain ECHO  - strict I/Os  - bnp 2733  - s/p Lopressor 5 mg iv x1, lopressor 50 mg po x1  - will continue iv lopressor  - s/p lovenox 70 mg in ED, will continue AC for porcine valve    #Hx COPD, not in exacerbation  - continue home medications    # Leukocytosis  - RVP negative  - will check ua  - wbc 13, will trend    # Hx of DM   - hold PO meds  - continue insulin + ISS  - monitor FS   71 y/o male with pmhx of HFrEF 43 % , AICD, AVR, hypertension, paroxysmal A-fib (status post ablation 1/14/24), CAD s/p PCI , diabetes, hyperlipidemia, COPD (on 2L home O2), frequent  falls, recent admission for chf exacerbation presents c/o SOB since this morning.    # Dyspnea likely 2/2  HFrEFexacerbation   # elevated troponin  # a-fib rvr  # Hx AICD, AVR, CAD s/p PCI  - ICU consulted: tele admission  - s/p lasix 40 mg in ED, will continue lasix 40 mg IV qd  - cardiology consult  - serial troponin 116 (56 last admission)  - obtain ECHO  - strict I/Os  - bnp 2733  - s/p Lopressor 5 mg iv x1, lopressor 50 mg po x1  - will continue iv lopressor prn  - s/p lovenox 70 mg in ED, will continue AC for porcine valve    #Hx COPD, not in exacerbation  - continue home medications    # Leukocytosis  - RVP negative  - will check ua  - wbc 13, will trend    # Hx of DM   - hold PO meds  - continue insulin + ISS  - monitor FS    DVT ppx   69 y/o male with pmhx of HFrEF 43 % , AICD, AVR, hypertension, paroxysmal A-fib (status post ablation 1/14/24), CAD s/p PCI , diabetes, hyperlipidemia, COPD (on 2L home O2), frequent  falls, recent admission for chf exacerbation presents c/o SOB since this morning.    # Dyspnea likely 2/2  HFrEFexacerbation   # elevated troponin  # a-fib rvr  # Hx AICD, AVR, CAD s/p PCI  - ICU consulted: tele admission  - s/p lasix 40 mg in ED, will continue lasix 40 mg IV qd  - cardiology consult  - serial troponin 116 (56 last admission)  - obtain ECHO  - strict I/Os  - bnp 2733  - s/p Lopressor 5 mg iv x1, lopressor 50 mg po x1  - will continue iv lopressor prn  - s/p lovenox 70 mg in ED, will continue Eliquis    #Hx COPD, not in exacerbation  - continue home medications  - continue supplemental oxygen when needed    # Leukocytosis  - RVP negative  - will check ua  - wbc 13, will trend    # Hx of DM   - hold PO meds  - will switch Tresiba to Lantus and start ISS  - monitor FS    DVT ppx: eliquis

## 2025-04-14 NOTE — H&P ADULT - HISTORY OF PRESENT ILLNESS
71 y/o male with pmhx of HFrEF 43 % , AICD, AVR, hypertension, paroxysmal A-fib (status post ablation 1/14/24), CAD s/p PCI , diabetes, hyperlipidemia, COPD (on 2L home O2), frequent  falls, recent admission for chf exacerbation presents to ED c/o SOB since this morning. Pt also admits to worsening LESLIE that requested increase oxygen use. Pt denies fever, chills, sob. Pt has been complaint with his medications. He denies nausea, vomiting, abdominal pain, diarrhea, headache, dizziness, weakness, back pain, LOC, trauma, urinary symptoms, cough.

## 2025-04-14 NOTE — ED PROVIDER NOTE - PHYSICAL EXAMINATION
VITAL SIGNS: I have reviewed nursing notes and confirm.  CONSTITUTIONAL: 69 yo M laying on stretcher; in no acute distress.  SKIN: Skin exam is warm and dry, no acute rash.  HEAD: Normocephalic; atraumatic.  EYES: PERRL, EOM intact; conjunctiva and sclera clear.  ENT: No nasal discharge; airway clear.   CARD: S1, S2 normal; no murmurs, gallops, or rubs. Tachycardic, regular.   RESP: No wheezes, or rhonchi. Speaking in full sentences. (+) rales L base, decreased BS B/L.  ABD: Normal bowel sounds; soft; non-distended; non-tender; No rebound or guarding. No CVA tenderness.  EXT: Normal ROM. No clubbing, cyanosis. (+) 1+ B/L LE edema.   NEURO: Alert, oriented. Grossly unremarkable. No focal deficits.

## 2025-04-14 NOTE — ED PROVIDER NOTE - PROGRESS NOTE DETAILS
70 y.o M PMHxHFrEF 43 % , AICD, AVR, hypertension, paroxysmal A-fib status post ablation 1/14/24, CAD s/p PCI , diabetes, hyperlipidemia, COPD on 2 L nasal cannula, current smoker, presents ED brought in by EMS for chief complaint of shortness of breath since this morning.  Denies any fevers chills trauma chest pain back pain abdominal pain nausea vomiting diarrhea.  Patient is slightly tachypneic tachycardic.  Rales on auscultation. Heart irregular no murmur heard.  Patient satting well on supplemental O2 nasal cannula.  Speaking full sentences.  +1 bilateral pitting edema lower extremities states that he has been compliant with his home dose of Lasix..  Plan for basic labs chest x-ray , POCUS.    ECG NSR..  Right axis deviation.  Left bundle branch block.    Chest x-ray bedside POCUS concerning for bilateral pleural effusions right greater than left.  Will give IV Lasix pending labs most likely admission Pt evaluated by ICU Dr. Muller, pt can be admitted to [facility] at this time.

## 2025-04-14 NOTE — H&P ADULT - NS ATTEND AMEND GEN_ALL_CORE FT
Seen at bedside, old chart reviewed. States he is feeling better and wants to be discharged as soon as possible

## 2025-04-14 NOTE — ED PROVIDER NOTE - EKG/XRAY ADDITIONAL INFORMATION
Normal sinus rhythm.  Right axis deviation.  Left bundle branch block.  ST segment abnormalities inferior laterally may be related to the left bundle branch block.

## 2025-04-14 NOTE — CONSULT NOTE ADULT - ASSESSMENT
IMPRESSION/PLAN:  dyspnea  exacerbation chf  elevated troponin  a-fib rvr    Pt w sob now improved after lasix.  HR ~ 130's and troponin elevated, recommend lopressor 5mg IVP x 1.  Continue AC for elevated troponin (pt w porcine valve).  Cardio eval. Check serial CE.  Lasix prn. Monitor on telemetry.    CNS: avoid sedation    HEENT: oral care    PULMONARY: supplemental O2    CARDIOVASCULAR: serial CE, Cardio eval, TTE, control HR, continue AC for porcine valve, elevated troponin    GI: GI prophylaxis.  Feeding     RENAL: monitor uo    INFECTIOUS DISEASE: monitor off abx    HEMATOLOGICAL:  continue AC    ENDOCRINE:  Follow up FS.  Insulin protocol if needed.    MUSCULOSKELETAL: oob to chair/pt eval        CRITICAL CARE TIME SPENT: 35 minutes

## 2025-04-14 NOTE — ED ADULT NURSE NOTE - NSICDXPASTSURGICALHX_GEN_ALL_CORE_FT
Size Of Lesion: 8mm Detail Level: Detailed PAST SURGICAL HISTORY:  H/O heart artery stent     Heart valve replaced aortic    History of Nissen fundoplication

## 2025-04-14 NOTE — CONSULT NOTE ADULT - CONSULT REQUESTED DATE/TIME
The following labs were labeled with appropriate pt sticker and tubed to lab:     [] Blue     [x] Lavender   [] on ice  [x] Green/yellow  [x] Green/black [] on ice  [] Inessa Cowpens  [] on ice  [x] Yellow  [] Red  [] Type/ Screen  [] ABG  [] VBG    [] COVID-19 swab    [] Rapid  [] PCR  [] Flu swab  [] Peds Viral Panel     [] Urine Sample  [] Fecal Sample  [] Pelvic Cultures  [] Blood Cultures  [] X 2  [] STREP Cultures         Bia Marks RN  12/30/22 [id number] 14-Apr-2025 17:08

## 2025-04-14 NOTE — DISCHARGE NOTE PROVIDER - PROVIDER RX CONTACT NUMBER
Pt had a hysterectomy 6 weeks ago and she has been having vaginal pressure and tissue coming out when having a bowel movement on and off since she has been recovering. She called the resident who told her to come in. There is concern that her cuff may have broken. She says she is not having any bleeding.          [phone]

## 2025-04-14 NOTE — CONSULT NOTE ADULT - SUBJECTIVE AND OBJECTIVE BOX
Patient is a 70y old  Male who presents with a chief complaint of dyspnea    HPI: Pt is a 69 y/o male with pmhx of HFrEF 43 % , AICD, AVR, hypertension, paroxysmal A-fib status post ablation 1/14/24, CAD s/p PCI , diabetes, hyperlipidemia, COPD recently started on  home O2 , current smoker, recent discharge for dyspnea, returns for sudden onset of sob that started today, denies chest pain/palpitations, no cough fever or chills      PAST MEDICAL & SURGICAL HISTORY:  CHF (congestive heart failure)      Diabetes      CAD (coronary artery disease)      Chronic obstructive pulmonary disease (COPD)      HTN (hypertension)      Stented coronary artery      Dyslipidemia      GERD (gastroesophageal reflux disease)      Afib      CVA (cerebral vascular accident)      H/O heart artery stent      Heart valve replaced  aortic      History of Nissen fundoplication        Allergies    Bactrim (Unknown)    Intolerances      FAMILY HISTORY:    Home Medications:  acetaminophen 325 mg oral tablet: 2 tab(s) orally every 6 hours As needed Temp greater or equal to 38C (100.4F), Mild Pain (1 - 3) (28 Mar 2025 10:16)  atorvastatin 40 mg oral tablet: 1 tab(s) orally once a day (28 Mar 2025 10:16)  clopidogrel 75 mg oral tablet: 1 tab(s) orally once a day (28 Mar 2025 10:16)  eszopiclone 3 mg oral tablet: 1 tab(s) orally once a day (at bedtime) (28 Mar 2025 10:16)  ezetimibe 10 mg oral tablet: 1 orally once a day (28 Mar 2025 10:16)  fenofibrate 145 mg oral tablet: 1 orally once a day (28 Mar 2025 10:16)  metFORMIN 500 mg oral tablet: 1 tab(s) orally 2 times a day (28 Mar 2025 10:16)  Trulicity Pen 1.5 mg/0.5 mL subcutaneous solution: 1.5 milligram(s) subcutaneously once a week (28 Mar 2025 10:16)    Occupation:  Alochol: Denied  Smoking: Denied  Drug Use: Denied  Marital Status:         ROS: as in HPI; All other systems reviewed are negative    ICU Vital Signs Last 24 Hrs  T(C): 36.3 (14 Apr 2025 14:45), Max: 36.3 (14 Apr 2025 14:45)  T(F): 97.3 (14 Apr 2025 14:45), Max: 97.3 (14 Apr 2025 14:45)  HR: 135 (14 Apr 2025 17:06) (129 - 136)  BP: 115/80 (14 Apr 2025 17:06) (115/80 - 135/88)  BP(mean): --  ABP: --  ABP(mean): --  RR: 20 (14 Apr 2025 17:06) (20 - 20)  SpO2: 96% (14 Apr 2025 17:06) (95% - 96%)    O2 Parameters below as of 14 Apr 2025 17:06  Patient On (Oxygen Delivery Method): nasal cannula  O2 Flow (L/min): 4            Physical Examination:    General: No acute distress.  Alert, oriented, interactive, nonfocal    HEENT: Pupils equal, reactive to light.  Symmetric.    PULM: Clear to auscultation bilaterally, no significant sputum production    CVS: Regular rate and rhythm, no murmurs, rubs, or gallops    ABD: Soft, nondistended, nontender, normoactive bowel sounds, no masses    1-2+ edema b/l le, nontender    SKIN: Warm and well perfused, no rashes noted.              I&O's Detail        LABS:                        12.5   13.35 )-----------( 448      ( 14 Apr 2025 14:59 )             40.1     14 Apr 2025 14:59    142    |  103    |  19     ----------------------------<  148    4.1     |  27     |  1.3      Ca    9.8        14 Apr 2025 14:59  Mg     1.8       14 Apr 2025 14:59    TPro  6.4    /  Alb  3.7    /  TBili  0.3    /  DBili  x      /  AST  14     /  ALT  14     /  AlkPhos  95     14 Apr 2025 14:59  Amylase x     lipase x              CAPILLARY BLOOD GLUCOSE          Urinalysis Basic - ( 14 Apr 2025 14:59 )    Color: x / Appearance: x / SG: x / pH: x  Gluc: 148 mg/dL / Ketone: x  / Bili: x / Urobili: x   Blood: x / Protein: x / Nitrite: x   Leuk Esterase: x / RBC: x / WBC x   Sq Epi: x / Non Sq Epi: x / Bacteria: x      Culture        MEDICATIONS  (STANDING):    MEDICATIONS  (PRN):        RADIOLOGY: ***     CXR:    IMPRESSION:  Worsening congestive changes including bilateral pleural effusions and   bilateral opacities.

## 2025-04-14 NOTE — DISCHARGE NOTE PROVIDER - NSDCCPCAREPLAN_GEN_ALL_CORE_FT
PRINCIPAL DISCHARGE DIAGNOSIS  Diagnosis: CHF exacerbation  Assessment and Plan of Treatment: please follow up with cardiology as soon as possible. You are aware that you are choosing to leave against medical advice despite not having recieved full treatment and medical clearance for your consition.      SECONDARY DISCHARGE DIAGNOSES  Diagnosis: Elevated troponin  Assessment and Plan of Treatment:

## 2025-04-14 NOTE — ED PROVIDER NOTE - ATTENDING APP SHARED VISIT CONTRIBUTION OF CARE
Patient was restrained.  He denies chest pain, cough, fever, trauma.  His past medical history of CHF and COPD.  Echo done earlier this year showed a ejection fraction of 43%.  Vital signs noted.  Positive JVD.  Positive Rales one third the way up bilaterally.  Heart irregular no murmur heard.  Abdomen nontender.  Extremity 2+ edema bilaterally.  Chest ray consistent with CHF echo has B-lines consistent with CHF.  IV Lasix ordered. Patient was restrained.  He denies chest pain, cough, fever, trauma.  His past medical history of CHF and COPD.  Echo done earlier this year showed a ejection fraction of 43%.  Vital signs noted.  Positive JVD.  Positive Rales one third the way up bilaterally.  Heart irregular no murmur heard.  Abdomen nontender.  Extremity 2+ edema bilaterally.  Chest ray consistent with CHF, and bedside echo has B-lines consistent with CHF.  IV Lasix ordered.

## 2025-04-18 ENCOUNTER — INPATIENT (INPATIENT)
Facility: HOSPITAL | Age: 70
LOS: 6 days | Discharge: HOME HEALTH | DRG: 280 | End: 2025-04-25
Attending: INTERNAL MEDICINE | Admitting: INTERNAL MEDICINE
Payer: MEDICARE

## 2025-04-18 VITALS
DIASTOLIC BLOOD PRESSURE: 106 MMHG | TEMPERATURE: 98 F | HEART RATE: 114 BPM | OXYGEN SATURATION: 99 % | WEIGHT: 169.98 LBS | RESPIRATION RATE: 20 BRPM | SYSTOLIC BLOOD PRESSURE: 188 MMHG

## 2025-04-18 DIAGNOSIS — E78.5 HYPERLIPIDEMIA, UNSPECIFIED: ICD-10-CM

## 2025-04-18 DIAGNOSIS — Z95.2 PRESENCE OF PROSTHETIC HEART VALVE: ICD-10-CM

## 2025-04-18 DIAGNOSIS — Z99.81 DEPENDENCE ON SUPPLEMENTAL OXYGEN: ICD-10-CM

## 2025-04-18 DIAGNOSIS — J44.9 CHRONIC OBSTRUCTIVE PULMONARY DISEASE, UNSPECIFIED: ICD-10-CM

## 2025-04-18 DIAGNOSIS — Z98.890 OTHER SPECIFIED POSTPROCEDURAL STATES: Chronic | ICD-10-CM

## 2025-04-18 DIAGNOSIS — Z79.4 LONG TERM (CURRENT) USE OF INSULIN: ICD-10-CM

## 2025-04-18 DIAGNOSIS — Z88.8 ALLERGY STATUS TO OTHER DRUGS, MEDICAMENTS AND BIOLOGICAL SUBSTANCES: ICD-10-CM

## 2025-04-18 DIAGNOSIS — Z79.85 LONG-TERM (CURRENT) USE OF INJECTABLE NON-INSULIN ANTIDIABETIC DRUGS: ICD-10-CM

## 2025-04-18 DIAGNOSIS — Z87.891 PERSONAL HISTORY OF NICOTINE DEPENDENCE: ICD-10-CM

## 2025-04-18 DIAGNOSIS — Z95.810 PRESENCE OF AUTOMATIC (IMPLANTABLE) CARDIAC DEFIBRILLATOR: ICD-10-CM

## 2025-04-18 DIAGNOSIS — I25.10 ATHEROSCLEROTIC HEART DISEASE OF NATIVE CORONARY ARTERY WITHOUT ANGINA PECTORIS: ICD-10-CM

## 2025-04-18 DIAGNOSIS — D72.829 ELEVATED WHITE BLOOD CELL COUNT, UNSPECIFIED: ICD-10-CM

## 2025-04-18 DIAGNOSIS — I50.23 ACUTE ON CHRONIC SYSTOLIC (CONGESTIVE) HEART FAILURE: ICD-10-CM

## 2025-04-18 DIAGNOSIS — Z95.2 PRESENCE OF PROSTHETIC HEART VALVE: Chronic | ICD-10-CM

## 2025-04-18 DIAGNOSIS — R79.89 OTHER SPECIFIED ABNORMAL FINDINGS OF BLOOD CHEMISTRY: ICD-10-CM

## 2025-04-18 DIAGNOSIS — Z79.02 LONG TERM (CURRENT) USE OF ANTITHROMBOTICS/ANTIPLATELETS: ICD-10-CM

## 2025-04-18 DIAGNOSIS — I48.0 PAROXYSMAL ATRIAL FIBRILLATION: ICD-10-CM

## 2025-04-18 DIAGNOSIS — K21.9 GASTRO-ESOPHAGEAL REFLUX DISEASE WITHOUT ESOPHAGITIS: ICD-10-CM

## 2025-04-18 DIAGNOSIS — E11.9 TYPE 2 DIABETES MELLITUS WITHOUT COMPLICATIONS: ICD-10-CM

## 2025-04-18 DIAGNOSIS — Z79.84 LONG TERM (CURRENT) USE OF ORAL HYPOGLYCEMIC DRUGS: ICD-10-CM

## 2025-04-18 DIAGNOSIS — I11.0 HYPERTENSIVE HEART DISEASE WITH HEART FAILURE: ICD-10-CM

## 2025-04-18 DIAGNOSIS — Z95.5 PRESENCE OF CORONARY ANGIOPLASTY IMPLANT AND GRAFT: ICD-10-CM

## 2025-04-18 DIAGNOSIS — Z95.5 PRESENCE OF CORONARY ANGIOPLASTY IMPLANT AND GRAFT: Chronic | ICD-10-CM

## 2025-04-18 DIAGNOSIS — Z79.01 LONG TERM (CURRENT) USE OF ANTICOAGULANTS: ICD-10-CM

## 2025-04-18 LAB
ALBUMIN SERPL ELPH-MCNC: 4.1 G/DL — SIGNIFICANT CHANGE UP (ref 3.5–5.2)
ALP SERPL-CCNC: 119 U/L — HIGH (ref 30–115)
ALT FLD-CCNC: 14 U/L — SIGNIFICANT CHANGE UP (ref 0–41)
ANION GAP SERPL CALC-SCNC: 21 MMOL/L — HIGH (ref 7–14)
AST SERPL-CCNC: 14 U/L — SIGNIFICANT CHANGE UP (ref 0–41)
B-OH-BUTYR SERPL-SCNC: 0.2 MMOL/L — SIGNIFICANT CHANGE UP
BASE EXCESS BLDV CALC-SCNC: -4.6 MMOL/L — LOW (ref -2–3)
BASOPHILS # BLD AUTO: 0.14 K/UL — SIGNIFICANT CHANGE UP (ref 0–0.2)
BASOPHILS NFR BLD AUTO: 0.9 % — SIGNIFICANT CHANGE UP (ref 0–1)
BILIRUB SERPL-MCNC: 0.5 MG/DL — SIGNIFICANT CHANGE UP (ref 0.2–1.2)
BUN SERPL-MCNC: 26 MG/DL — HIGH (ref 10–20)
CA-I SERPL-SCNC: 1.14 MMOL/L — LOW (ref 1.15–1.33)
CALCIUM SERPL-MCNC: 9.7 MG/DL — SIGNIFICANT CHANGE UP (ref 8.4–10.5)
CHLORIDE SERPL-SCNC: 92 MMOL/L — LOW (ref 98–110)
CO2 SERPL-SCNC: 22 MMOL/L — SIGNIFICANT CHANGE UP (ref 17–32)
CREAT SERPL-MCNC: 1.7 MG/DL — HIGH (ref 0.7–1.5)
EGFR: 43 ML/MIN/1.73M2 — LOW
EGFR: 43 ML/MIN/1.73M2 — LOW
EOSINOPHIL # BLD AUTO: 0.5 K/UL — SIGNIFICANT CHANGE UP (ref 0–0.7)
EOSINOPHIL NFR BLD AUTO: 3.1 % — SIGNIFICANT CHANGE UP (ref 0–8)
GAS PNL BLDV: 129 MMOL/L — LOW (ref 136–145)
GAS PNL BLDV: SIGNIFICANT CHANGE UP
GLUCOSE SERPL-MCNC: 446 MG/DL — HIGH (ref 70–99)
HCO3 BLDV-SCNC: 23 MMOL/L — SIGNIFICANT CHANGE UP (ref 22–29)
HCT VFR BLD CALC: 41.4 % — LOW (ref 42–52)
HCT VFR BLDA CALC: 42 % — SIGNIFICANT CHANGE UP (ref 39–51)
HGB BLD CALC-MCNC: 13.9 G/DL — SIGNIFICANT CHANGE UP (ref 12.6–17.4)
HGB BLD-MCNC: 12.7 G/DL — LOW (ref 14–18)
IMM GRANULOCYTES NFR BLD AUTO: 0.6 % — HIGH (ref 0.1–0.3)
LACTATE BLDV-MCNC: 6.4 MMOL/L — CRITICAL HIGH (ref 0.5–2)
LYMPHOCYTES # BLD AUTO: 1.74 K/UL — SIGNIFICANT CHANGE UP (ref 1.2–3.4)
LYMPHOCYTES # BLD AUTO: 10.6 % — LOW (ref 20.5–51.1)
MCHC RBC-ENTMCNC: 27.5 PG — SIGNIFICANT CHANGE UP (ref 27–31)
MCHC RBC-ENTMCNC: 30.7 G/DL — LOW (ref 32–37)
MCV RBC AUTO: 89.6 FL — SIGNIFICANT CHANGE UP (ref 80–94)
MONOCYTES # BLD AUTO: 1.08 K/UL — HIGH (ref 0.1–0.6)
MONOCYTES NFR BLD AUTO: 6.6 % — SIGNIFICANT CHANGE UP (ref 1.7–9.3)
NEUTROPHILS # BLD AUTO: 12.82 K/UL — HIGH (ref 1.4–6.5)
NEUTROPHILS NFR BLD AUTO: 78.2 % — HIGH (ref 42.2–75.2)
NRBC BLD AUTO-RTO: 0 /100 WBCS — SIGNIFICANT CHANGE UP (ref 0–0)
NT-PROBNP SERPL-SCNC: 2711 PG/ML — HIGH (ref 0–300)
PCO2 BLDV: 53 MMHG — SIGNIFICANT CHANGE UP (ref 42–55)
PH BLDV: 7.25 — LOW (ref 7.32–7.43)
PLATELET # BLD AUTO: 621 K/UL — HIGH (ref 130–400)
PMV BLD: 10.9 FL — HIGH (ref 7.4–10.4)
PO2 BLDV: 18 MMHG — LOW (ref 25–45)
POTASSIUM BLDV-SCNC: 3.5 MMOL/L — SIGNIFICANT CHANGE UP (ref 3.5–5.1)
POTASSIUM SERPL-MCNC: 4 MMOL/L — SIGNIFICANT CHANGE UP (ref 3.5–5)
POTASSIUM SERPL-SCNC: 4 MMOL/L — SIGNIFICANT CHANGE UP (ref 3.5–5)
PROT SERPL-MCNC: 7.3 G/DL — SIGNIFICANT CHANGE UP (ref 6–8)
RBC # BLD: 4.62 M/UL — LOW (ref 4.7–6.1)
RBC # FLD: 14.1 % — SIGNIFICANT CHANGE UP (ref 11.5–14.5)
SAO2 % BLDV: 15.3 % — LOW (ref 67–88)
SODIUM SERPL-SCNC: 135 MMOL/L — SIGNIFICANT CHANGE UP (ref 135–146)
TROPONIN T, HIGH SENSITIVITY RESULT: 145 NG/L — CRITICAL HIGH (ref 6–21)
WBC # BLD: 16.38 K/UL — HIGH (ref 4.8–10.8)
WBC # FLD AUTO: 16.38 K/UL — HIGH (ref 4.8–10.8)

## 2025-04-18 PROCEDURE — 99291 CRITICAL CARE FIRST HOUR: CPT

## 2025-04-18 PROCEDURE — 93010 ELECTROCARDIOGRAM REPORT: CPT

## 2025-04-18 PROCEDURE — 71045 X-RAY EXAM CHEST 1 VIEW: CPT | Mod: 26

## 2025-04-18 RX ORDER — NITROGLYCERIN 20 MG/G
400 OINTMENT TOPICAL
Qty: 50 | Refills: 0 | Status: DISCONTINUED | OUTPATIENT
Start: 2025-04-18 | End: 2025-04-19

## 2025-04-18 RX ORDER — MAGNESIUM SULFATE 500 MG/ML
2 SYRINGE (ML) INJECTION ONCE
Refills: 0 | Status: COMPLETED | OUTPATIENT
Start: 2025-04-18 | End: 2025-04-18

## 2025-04-18 RX ORDER — ENOXAPARIN SODIUM 100 MG/ML
70 INJECTION SUBCUTANEOUS ONCE
Refills: 0 | Status: COMPLETED | OUTPATIENT
Start: 2025-04-18 | End: 2025-04-18

## 2025-04-18 RX ORDER — CEFEPIME 2 G/20ML
2000 INJECTION, POWDER, FOR SOLUTION INTRAVENOUS ONCE
Refills: 0 | Status: COMPLETED | OUTPATIENT
Start: 2025-04-18 | End: 2025-04-18

## 2025-04-18 RX ADMIN — CEFEPIME 100 MILLIGRAM(S): 2 INJECTION, POWDER, FOR SOLUTION INTRAVENOUS at 22:57

## 2025-04-18 RX ADMIN — Medication 150 MILLILITER(S): at 22:57

## 2025-04-18 RX ADMIN — Medication 25 GRAM(S): at 22:57

## 2025-04-18 RX ADMIN — NITROGLYCERIN 120 MICROGRAM(S)/MIN: 20 OINTMENT TOPICAL at 22:36

## 2025-04-18 NOTE — ED ADULT NURSE NOTE - DOES PATIENT HAVE ADVANCE DIRECTIVE
Since you changed to 12 cap daily and it is a script for Heather I hope his insurance company will cover the new dosage. Order pended, attach diagnosis, please review, approve or deny.
No

## 2025-04-18 NOTE — ED ADULT NURSE NOTE - NSICDXPASTSURGICALHX_GEN_ALL_CORE_FT
no PAST SURGICAL HISTORY:  H/O heart artery stent     Heart valve replaced aortic    History of Nissen fundoplication

## 2025-04-19 DIAGNOSIS — I50.9 HEART FAILURE, UNSPECIFIED: ICD-10-CM

## 2025-04-19 DIAGNOSIS — J81.0 ACUTE PULMONARY EDEMA: ICD-10-CM

## 2025-04-19 LAB
A1C WITH ESTIMATED AVERAGE GLUCOSE RESULT: 9.7 % — HIGH (ref 4–5.6)
ALBUMIN SERPL ELPH-MCNC: 3.5 G/DL — SIGNIFICANT CHANGE UP (ref 3.5–5.2)
ALP SERPL-CCNC: 101 U/L — SIGNIFICANT CHANGE UP (ref 30–115)
ALT FLD-CCNC: 11 U/L — SIGNIFICANT CHANGE UP (ref 0–41)
ANION GAP SERPL CALC-SCNC: 12 MMOL/L — SIGNIFICANT CHANGE UP (ref 7–14)
APTT BLD: 27.3 SEC — SIGNIFICANT CHANGE UP (ref 27–39.2)
AST SERPL-CCNC: 11 U/L — SIGNIFICANT CHANGE UP (ref 0–41)
BASE EXCESS BLDV CALC-SCNC: 1.4 MMOL/L — SIGNIFICANT CHANGE UP (ref -2–3)
BILIRUB SERPL-MCNC: 0.5 MG/DL — SIGNIFICANT CHANGE UP (ref 0.2–1.2)
BUN SERPL-MCNC: 26 MG/DL — HIGH (ref 10–20)
CA-I SERPL-SCNC: 1.15 MMOL/L — SIGNIFICANT CHANGE UP (ref 1.15–1.33)
CALCIUM SERPL-MCNC: 9.4 MG/DL — SIGNIFICANT CHANGE UP (ref 8.4–10.5)
CHLORIDE SERPL-SCNC: 97 MMOL/L — LOW (ref 98–110)
CHOLEST SERPL-MCNC: 209 MG/DL — HIGH
CO2 SERPL-SCNC: 25 MMOL/L — SIGNIFICANT CHANGE UP (ref 17–32)
CREAT SERPL-MCNC: 1.7 MG/DL — HIGH (ref 0.7–1.5)
EGFR: 43 ML/MIN/1.73M2 — LOW
EGFR: 43 ML/MIN/1.73M2 — LOW
ESTIMATED AVERAGE GLUCOSE: 232 MG/DL — HIGH (ref 68–114)
FLUAV AG NPH QL: SIGNIFICANT CHANGE UP
FLUBV AG NPH QL: SIGNIFICANT CHANGE UP
GAS PNL BLDV: 129 MMOL/L — LOW (ref 136–145)
GAS PNL BLDV: SIGNIFICANT CHANGE UP
GLUCOSE BLDC GLUCOMTR-MCNC: 321 MG/DL — HIGH (ref 70–99)
GLUCOSE BLDC GLUCOMTR-MCNC: 374 MG/DL — HIGH (ref 70–99)
GLUCOSE SERPL-MCNC: 301 MG/DL — HIGH (ref 70–99)
HCO3 BLDV-SCNC: 25 MMOL/L — SIGNIFICANT CHANGE UP (ref 22–29)
HCT VFR BLD CALC: 33.6 % — LOW (ref 42–52)
HCT VFR BLDA CALC: 32 % — LOW (ref 39–51)
HDLC SERPL-MCNC: 34 MG/DL — LOW
HGB BLD CALC-MCNC: 10.7 G/DL — LOW (ref 12.6–17.4)
HGB BLD-MCNC: 10.7 G/DL — LOW (ref 14–18)
LACTATE BLDV-MCNC: 2 MMOL/L — SIGNIFICANT CHANGE UP (ref 0.5–2)
LACTATE SERPL-SCNC: 1.8 MMOL/L — SIGNIFICANT CHANGE UP (ref 0.7–2)
LDLC SERPL-MCNC: 154 MG/DL — HIGH
LIPID PNL WITH DIRECT LDL SERPL: 154 MG/DL — HIGH
MAGNESIUM SERPL-MCNC: 2.7 MG/DL — HIGH (ref 1.8–2.4)
MCHC RBC-ENTMCNC: 27.6 PG — SIGNIFICANT CHANGE UP (ref 27–31)
MCHC RBC-ENTMCNC: 31.8 G/DL — LOW (ref 32–37)
MCV RBC AUTO: 86.6 FL — SIGNIFICANT CHANGE UP (ref 80–94)
NONHDLC SERPL-MCNC: 175 MG/DL — HIGH
NRBC BLD AUTO-RTO: 0 /100 WBCS — SIGNIFICANT CHANGE UP (ref 0–0)
NT-PROBNP SERPL-SCNC: 3684 PG/ML — HIGH (ref 0–300)
PCO2 BLDV: 33 MMHG — LOW (ref 42–55)
PH BLDV: 7.48 — HIGH (ref 7.32–7.43)
PLATELET # BLD AUTO: 452 K/UL — HIGH (ref 130–400)
PMV BLD: 10.5 FL — HIGH (ref 7.4–10.4)
PO2 BLDV: 41 MMHG — SIGNIFICANT CHANGE UP (ref 25–45)
POTASSIUM BLDV-SCNC: 4.4 MMOL/L — SIGNIFICANT CHANGE UP (ref 3.5–5.1)
POTASSIUM SERPL-MCNC: 4.2 MMOL/L — SIGNIFICANT CHANGE UP (ref 3.5–5)
POTASSIUM SERPL-SCNC: 4.2 MMOL/L — SIGNIFICANT CHANGE UP (ref 3.5–5)
PROT SERPL-MCNC: 6.3 G/DL — SIGNIFICANT CHANGE UP (ref 6–8)
RBC # BLD: 3.88 M/UL — LOW (ref 4.7–6.1)
RBC # FLD: 14 % — SIGNIFICANT CHANGE UP (ref 11.5–14.5)
RSV RNA NPH QL NAA+NON-PROBE: SIGNIFICANT CHANGE UP
SAO2 % BLDV: 74.8 % — SIGNIFICANT CHANGE UP (ref 67–88)
SARS-COV-2 RNA SPEC QL NAA+PROBE: SIGNIFICANT CHANGE UP
SODIUM SERPL-SCNC: 134 MMOL/L — LOW (ref 135–146)
SOURCE RESPIRATORY: SIGNIFICANT CHANGE UP
TRIGL SERPL-MCNC: 116 MG/DL — SIGNIFICANT CHANGE UP
TROPONIN T, HIGH SENSITIVITY RESULT: 162 NG/L — CRITICAL HIGH (ref 6–21)
TROPONIN T, HIGH SENSITIVITY RESULT: 166 NG/L — CRITICAL HIGH (ref 6–21)
TSH SERPL-MCNC: 1.65 UIU/ML — SIGNIFICANT CHANGE UP (ref 0.27–4.2)
WBC # BLD: 15.67 K/UL — HIGH (ref 4.8–10.8)
WBC # FLD AUTO: 15.67 K/UL — HIGH (ref 4.8–10.8)

## 2025-04-19 PROCEDURE — 83880 ASSAY OF NATRIURETIC PEPTIDE: CPT

## 2025-04-19 PROCEDURE — 36415 COLL VENOUS BLD VENIPUNCTURE: CPT

## 2025-04-19 PROCEDURE — 87641 MR-STAPH DNA AMP PROBE: CPT

## 2025-04-19 PROCEDURE — 82533 TOTAL CORTISOL: CPT

## 2025-04-19 PROCEDURE — 82962 GLUCOSE BLOOD TEST: CPT

## 2025-04-19 PROCEDURE — 93010 ELECTROCARDIOGRAM REPORT: CPT

## 2025-04-19 PROCEDURE — 84443 ASSAY THYROID STIM HORMONE: CPT

## 2025-04-19 PROCEDURE — 94640 AIRWAY INHALATION TREATMENT: CPT

## 2025-04-19 PROCEDURE — 97161 PT EVAL LOW COMPLEX 20 MIN: CPT | Mod: GP

## 2025-04-19 PROCEDURE — 99222 1ST HOSP IP/OBS MODERATE 55: CPT

## 2025-04-19 PROCEDURE — 93306 TTE W/DOPPLER COMPLETE: CPT

## 2025-04-19 PROCEDURE — 83735 ASSAY OF MAGNESIUM: CPT

## 2025-04-19 PROCEDURE — 83036 HEMOGLOBIN GLYCOSYLATED A1C: CPT

## 2025-04-19 PROCEDURE — 84484 ASSAY OF TROPONIN QUANT: CPT

## 2025-04-19 PROCEDURE — 99223 1ST HOSP IP/OBS HIGH 75: CPT

## 2025-04-19 PROCEDURE — 85027 COMPLETE CBC AUTOMATED: CPT

## 2025-04-19 PROCEDURE — 93005 ELECTROCARDIOGRAM TRACING: CPT

## 2025-04-19 PROCEDURE — 97110 THERAPEUTIC EXERCISES: CPT | Mod: GP

## 2025-04-19 PROCEDURE — 80048 BASIC METABOLIC PNL TOTAL CA: CPT

## 2025-04-19 PROCEDURE — 85730 THROMBOPLASTIN TIME PARTIAL: CPT

## 2025-04-19 PROCEDURE — 71045 X-RAY EXAM CHEST 1 VIEW: CPT

## 2025-04-19 PROCEDURE — 84100 ASSAY OF PHOSPHORUS: CPT

## 2025-04-19 PROCEDURE — 80061 LIPID PANEL: CPT

## 2025-04-19 PROCEDURE — 85025 COMPLETE CBC W/AUTO DIFF WBC: CPT

## 2025-04-19 PROCEDURE — 93010 ELECTROCARDIOGRAM REPORT: CPT | Mod: 76

## 2025-04-19 PROCEDURE — 83605 ASSAY OF LACTIC ACID: CPT

## 2025-04-19 PROCEDURE — 80053 COMPREHEN METABOLIC PANEL: CPT

## 2025-04-19 PROCEDURE — 87640 STAPH A DNA AMP PROBE: CPT

## 2025-04-19 RX ORDER — AMIODARONE HYDROCHLORIDE 50 MG/ML
200 INJECTION, SOLUTION INTRAVENOUS DAILY
Refills: 0 | Status: DISCONTINUED | OUTPATIENT
Start: 2025-04-19 | End: 2025-04-25

## 2025-04-19 RX ORDER — CLOPIDOGREL BISULFATE 75 MG/1
75 TABLET, FILM COATED ORAL DAILY
Refills: 0 | Status: DISCONTINUED | OUTPATIENT
Start: 2025-04-19 | End: 2025-04-25

## 2025-04-19 RX ORDER — CEFEPIME 2 G/20ML
2000 INJECTION, POWDER, FOR SOLUTION INTRAVENOUS DAILY
Refills: 0 | Status: DISCONTINUED | OUTPATIENT
Start: 2025-04-19 | End: 2025-04-19

## 2025-04-19 RX ORDER — SACUBITRIL AND VALSARTAN 6; 6 MG/1; MG/1
1 PELLET ORAL
Refills: 0 | Status: DISCONTINUED | OUTPATIENT
Start: 2025-04-19 | End: 2025-04-20

## 2025-04-19 RX ORDER — METOPROLOL SUCCINATE 50 MG/1
25 TABLET, EXTENDED RELEASE ORAL DAILY
Refills: 0 | Status: DISCONTINUED | OUTPATIENT
Start: 2025-04-20 | End: 2025-04-25

## 2025-04-19 RX ORDER — APIXABAN 2.5 MG/1
5 TABLET, FILM COATED ORAL EVERY 12 HOURS
Refills: 0 | Status: DISCONTINUED | OUTPATIENT
Start: 2025-04-19 | End: 2025-04-19

## 2025-04-19 RX ORDER — CARVEDILOL 3.12 MG/1
25 TABLET, FILM COATED ORAL ONCE
Refills: 0 | Status: COMPLETED | OUTPATIENT
Start: 2025-04-19 | End: 2025-04-19

## 2025-04-19 RX ORDER — CARVEDILOL 3.12 MG/1
25 TABLET, FILM COATED ORAL EVERY 12 HOURS
Refills: 0 | Status: DISCONTINUED | OUTPATIENT
Start: 2025-04-19 | End: 2025-04-19

## 2025-04-19 RX ORDER — MAGNESIUM SULFATE 500 MG/ML
2 SYRINGE (ML) INJECTION ONCE
Refills: 0 | Status: COMPLETED | OUTPATIENT
Start: 2025-04-19 | End: 2025-04-19

## 2025-04-19 RX ORDER — METOPROLOL SUCCINATE 50 MG/1
50 TABLET, EXTENDED RELEASE ORAL DAILY
Refills: 0 | Status: DISCONTINUED | OUTPATIENT
Start: 2025-04-19 | End: 2025-04-19

## 2025-04-19 RX ORDER — MAGNESIUM, ALUMINUM HYDROXIDE 200-200 MG
30 TABLET,CHEWABLE ORAL EVERY 4 HOURS
Refills: 0 | Status: DISCONTINUED | OUTPATIENT
Start: 2025-04-19 | End: 2025-04-25

## 2025-04-19 RX ORDER — ACETAMINOPHEN 500 MG/5ML
650 LIQUID (ML) ORAL EVERY 6 HOURS
Refills: 0 | Status: DISCONTINUED | OUTPATIENT
Start: 2025-04-19 | End: 2025-04-25

## 2025-04-19 RX ORDER — ONDANSETRON HCL/PF 4 MG/2 ML
4 VIAL (ML) INJECTION EVERY 8 HOURS
Refills: 0 | Status: DISCONTINUED | OUTPATIENT
Start: 2025-04-19 | End: 2025-04-25

## 2025-04-19 RX ORDER — FUROSEMIDE 10 MG/ML
40 INJECTION INTRAMUSCULAR; INTRAVENOUS ONCE
Refills: 0 | Status: COMPLETED | OUTPATIENT
Start: 2025-04-19 | End: 2025-04-19

## 2025-04-19 RX ORDER — NITROGLYCERIN 20 MG/G
5 OINTMENT TOPICAL
Qty: 50 | Refills: 0 | Status: DISCONTINUED | OUTPATIENT
Start: 2025-04-19 | End: 2025-04-21

## 2025-04-19 RX ORDER — EZETIMIBE 10 MG/1
10 TABLET ORAL DAILY
Refills: 0 | Status: DISCONTINUED | OUTPATIENT
Start: 2025-04-19 | End: 2025-04-19

## 2025-04-19 RX ORDER — HEPARIN SODIUM 1000 [USP'U]/ML
INJECTION INTRAVENOUS; SUBCUTANEOUS
Qty: 25000 | Refills: 0 | Status: DISCONTINUED | OUTPATIENT
Start: 2025-04-19 | End: 2025-04-21

## 2025-04-19 RX ORDER — FUROSEMIDE 10 MG/ML
60 INJECTION INTRAMUSCULAR; INTRAVENOUS EVERY 6 HOURS
Refills: 0 | Status: DISCONTINUED | OUTPATIENT
Start: 2025-04-19 | End: 2025-04-19

## 2025-04-19 RX ORDER — HEPARIN SODIUM 1000 [USP'U]/ML
6500 INJECTION INTRAVENOUS; SUBCUTANEOUS EVERY 6 HOURS
Refills: 0 | Status: DISCONTINUED | OUTPATIENT
Start: 2025-04-19 | End: 2025-04-21

## 2025-04-19 RX ORDER — FUROSEMIDE 10 MG/ML
40 INJECTION INTRAMUSCULAR; INTRAVENOUS ONCE
Refills: 0 | Status: DISCONTINUED | OUTPATIENT
Start: 2025-04-19 | End: 2025-04-19

## 2025-04-19 RX ORDER — SPIRONOLACTONE 25 MG
25 TABLET ORAL DAILY
Refills: 0 | Status: DISCONTINUED | OUTPATIENT
Start: 2025-04-19 | End: 2025-04-19

## 2025-04-19 RX ORDER — INSULIN LISPRO 100 U/ML
INJECTION, SOLUTION INTRAVENOUS; SUBCUTANEOUS
Refills: 0 | Status: DISCONTINUED | OUTPATIENT
Start: 2025-04-19 | End: 2025-04-25

## 2025-04-19 RX ORDER — SPIRONOLACTONE 25 MG
25 TABLET ORAL ONCE
Refills: 0 | Status: COMPLETED | OUTPATIENT
Start: 2025-04-19 | End: 2025-04-19

## 2025-04-19 RX ORDER — FENOFIBRATE 160 MG/1
145 TABLET ORAL DAILY
Refills: 0 | Status: DISCONTINUED | OUTPATIENT
Start: 2025-04-19 | End: 2025-04-19

## 2025-04-19 RX ORDER — BUMETANIDE 1 MG/1
2 TABLET ORAL EVERY 12 HOURS
Refills: 0 | Status: DISCONTINUED | OUTPATIENT
Start: 2025-04-20 | End: 2025-04-22

## 2025-04-19 RX ORDER — AMIODARONE HYDROCHLORIDE 50 MG/ML
150 INJECTION, SOLUTION INTRAVENOUS ONCE
Refills: 0 | Status: COMPLETED | OUTPATIENT
Start: 2025-04-19 | End: 2025-04-19

## 2025-04-19 RX ORDER — KETOROLAC TROMETHAMINE 30 MG/ML
30 INJECTION, SOLUTION INTRAMUSCULAR; INTRAVENOUS ONCE
Refills: 0 | Status: DISCONTINUED | OUTPATIENT
Start: 2025-04-19 | End: 2025-04-19

## 2025-04-19 RX ORDER — FUROSEMIDE 10 MG/ML
60 INJECTION INTRAMUSCULAR; INTRAVENOUS ONCE
Refills: 0 | Status: COMPLETED | OUTPATIENT
Start: 2025-04-19 | End: 2025-04-19

## 2025-04-19 RX ORDER — ATORVASTATIN CALCIUM 80 MG/1
40 TABLET, FILM COATED ORAL AT BEDTIME
Refills: 0 | Status: DISCONTINUED | OUTPATIENT
Start: 2025-04-19 | End: 2025-04-25

## 2025-04-19 RX ADMIN — INSULIN LISPRO 4: 100 INJECTION, SOLUTION INTRAVENOUS; SUBCUTANEOUS at 17:44

## 2025-04-19 RX ADMIN — HEPARIN SODIUM 1400 UNIT(S)/HR: 1000 INJECTION INTRAVENOUS; SUBCUTANEOUS at 23:22

## 2025-04-19 RX ADMIN — ATORVASTATIN CALCIUM 40 MILLIGRAM(S): 80 TABLET, FILM COATED ORAL at 22:23

## 2025-04-19 RX ADMIN — NITROGLYCERIN 120 MICROGRAM(S)/MIN: 20 OINTMENT TOPICAL at 15:31

## 2025-04-19 RX ADMIN — SACUBITRIL AND VALSARTAN 1 TABLET(S): 6; 6 PELLET ORAL at 17:48

## 2025-04-19 RX ADMIN — CARVEDILOL 25 MILLIGRAM(S): 3.12 TABLET, FILM COATED ORAL at 05:35

## 2025-04-19 RX ADMIN — Medication 40 MILLIGRAM(S): at 07:30

## 2025-04-19 RX ADMIN — FUROSEMIDE 40 MILLIGRAM(S): 10 INJECTION INTRAMUSCULAR; INTRAVENOUS at 00:43

## 2025-04-19 RX ADMIN — FUROSEMIDE 60 MILLIGRAM(S): 10 INJECTION INTRAMUSCULAR; INTRAVENOUS at 05:35

## 2025-04-19 RX ADMIN — Medication 25 GRAM(S): at 04:00

## 2025-04-19 RX ADMIN — FUROSEMIDE 60 MILLIGRAM(S): 10 INJECTION INTRAMUSCULAR; INTRAVENOUS at 22:22

## 2025-04-19 RX ADMIN — Medication 40 MILLIEQUIVALENT(S): at 11:40

## 2025-04-19 RX ADMIN — HEPARIN SODIUM 6500 UNIT(S): 1000 INJECTION INTRAVENOUS; SUBCUTANEOUS at 23:29

## 2025-04-19 RX ADMIN — NITROGLYCERIN 400 MICROGRAM(S)/MIN: 20 OINTMENT TOPICAL at 04:37

## 2025-04-19 RX ADMIN — INSULIN LISPRO 5: 100 INJECTION, SOLUTION INTRAVENOUS; SUBCUTANEOUS at 12:11

## 2025-04-19 RX ADMIN — CEFEPIME 100 MILLIGRAM(S): 2 INJECTION, POWDER, FOR SOLUTION INTRAVENOUS at 11:41

## 2025-04-19 RX ADMIN — CLOPIDOGREL BISULFATE 75 MILLIGRAM(S): 75 TABLET, FILM COATED ORAL at 11:41

## 2025-04-19 RX ADMIN — NITROGLYCERIN 120 MICROGRAM(S)/MIN: 20 OINTMENT TOPICAL at 04:14

## 2025-04-19 RX ADMIN — FUROSEMIDE 60 MILLIGRAM(S): 10 INJECTION INTRAMUSCULAR; INTRAVENOUS at 11:41

## 2025-04-19 RX ADMIN — FUROSEMIDE 60 MILLIGRAM(S): 10 INJECTION INTRAMUSCULAR; INTRAVENOUS at 17:46

## 2025-04-19 RX ADMIN — ENOXAPARIN SODIUM 70 MILLIGRAM(S): 100 INJECTION SUBCUTANEOUS at 00:42

## 2025-04-19 RX ADMIN — Medication 25 MILLIGRAM(S): at 05:36

## 2025-04-19 NOTE — CONSULT NOTE ADULT - NSCONSULTADDITIONALINFOA_GEN_ALL_CORE
Thank you for the opportunity to participate in the care of this patient. Pt is aware and in agreement of the plan of care. All questions answered and concerns addressed. Please reconsult as needed.

## 2025-04-19 NOTE — H&P ADULT - NSHPLABSRESULTS_GEN_ALL_CORE
LABS:  cret                        12.7   16.38 )-----------( 621      ( 18 Apr 2025 22:20 )             41.4     04-18    135  |  92[L]  |  26[H]  ----------------------------<  446[H]  4.0   |  22  |  1.7[H]    Ca    9.7      18 Apr 2025 22:20    TPro  7.3  /  Alb  4.1  /  TBili  0.5  /  DBili  x   /  AST  14  /  ALT  14  /  AlkPhos  119[H]  04-18        RADIOLOGY:     Xray Chest 1 View- PORTABLE-Urgent (04.18.25 @ 22:23)     IMPRESSION: Low lung volume.    Bilateral opacities, unchanged.    Left sided permanent pacemaker.

## 2025-04-19 NOTE — PATIENT PROFILE ADULT - VISION (WITH CORRECTIVE LENSES IF THE PATIENT USUALLY WEARS THEM):
Normal vision: sees adequately in most situations; can see medication labels, newsprint 0 (no pain/absence of nonverbal indicators of pain)

## 2025-04-19 NOTE — PROGRESS NOTE ADULT - SUBJECTIVE AND OBJECTIVE BOX
CHIEF COMPLAINT:    Patient is a 70y old  Male who presents with a chief complaint of Tachycardia and Pulmonary Edema (2025 08:46)      INTERVAL HPI/OVERNIGHT EVENTS:    Patient seen and examined at bedside. Still has SOB.     Medications:  Standing  aMIOdarone    Tablet 200 milliGRAM(s) Oral daily  apixaban 5 milliGRAM(s) Oral every 12 hours  atorvastatin 40 milliGRAM(s) Oral at bedtime  cefepime   IVPB 2000 milliGRAM(s) IV Intermittent daily  clopidogrel Tablet 75 milliGRAM(s) Oral daily  furosemide   Injectable 60 milliGRAM(s) IV Push every 6 hours  furosemide   Injectable 40 milliGRAM(s) IV Push once  insulin lispro (ADMELOG) corrective regimen sliding scale   SubCutaneous three times a day before meals  nitroglycerin  Infusion 400 MICROgram(s)/Min IV Continuous <Continuous>  potassium chloride    Tablet ER 40 milliEquivalent(s) Oral daily  sacubitril 24 mG/valsartan 26 mG 1 Tablet(s) Oral two times a day  spironolactone 25 milliGRAM(s) Oral daily    PRN Meds  acetaminophen     Tablet .. 650 milliGRAM(s) Oral every 6 hours PRN  aluminum hydroxide/magnesium hydroxide/simethicone Suspension 30 milliLiter(s) Oral every 4 hours PRN  ondansetron Injectable 4 milliGRAM(s) IV Push every 8 hours PRN  zolpidem 5 milliGRAM(s) Oral at bedtime PRN    Vital Signs:    T(F): 97.5 (25 @ 10:09), Max: 97.8 (25 @ 22:04)  HR: 67 (25 @ 10:09) (67 - 147)  BP: 145/83 (25 @ 10:09) (112/59 - 188/106)  RR: 20 (25 @ 10:09) (18 - 20)  SpO2: 93% (25 @ 10:17) (93% - 99%)  I&O's Summary    Daily Height in cm: 162.56 (2025 10:09)    Daily Weight in k.6 (2025 10:09)  CAPILLARY BLOOD GLUCOSE      POCT Blood Glucose.: 371 mg/dL (2025 11:32)  POCT Blood Glucose.: 321 mg/dL (2025 09:59)  POCT Blood Glucose.: 374 mg/dL (2025 09:00)    PHYSICAL EXAM:  GENERAL:  in mild distress  SKIN: No rashes or lesions  HEENT: Atraumatic. Normocephalic. Anicteric  NECK:  No JVD.   PULMONARY: scattered crackles in b/l lung base  CVS: Normal S1, S2  ABDOMEN/GI: Soft, Nontender, Nondistended; Bowel sounds are present  EXTREMITIES:  pitting edema on b/l LE    LABS:                        10.7   15.67 )-----------( 452      ( 2025 11:44 )             33.6         135  |  92[L]  |  26[H]  ----------------------------<  446[H]  4.0   |  22  |  1.7[H]    Ca    9.7      2025 22:20    TPro  7.3  /  Alb  4.1  /  TBili  0.5  /  DBili  x   /  AST  14  /  ALT  14  /  AlkPhos  119[H]  -      RADIOLOGY & ADDITIONAL TESTS:  Imaging or report Personally Reviewed:  [ ] YES  [ ] NO -->no new images

## 2025-04-19 NOTE — PATIENT PROFILE ADULT - FALL HARM RISK - HARM RISK INTERVENTIONS

## 2025-04-19 NOTE — H&P ADULT - ASSESSMENT
Well known to me from recent admission (and then AMA) Old records reviewed  Well known to me from recent admission (and then AMA) Old records reviewed     # Acute pulmonary edema    Seen by ICU with rec's:  # CHF exacerbation.   ·  Recommendation: - Now titrated off of BiPAP and Nitroglycerin drip  - S/p 40mg of Lasix IV, Start Lasix 60mg IV q6 hours daily  - Start Coreg 25mg q12 hours daily  - Start Spironolactone 25mg daily  - Restart home regimen of Entresto  - Restart home regimen of Eliquis  - Echocardiogram  - Cardiology consult  - Monitor electrolytes and replete as needed to maintain K of 4, Mg of 2, and Phos of 3  - Repeat EKG, follow up results  - Trend trop to peak (current sample is hemolyzed)  - BNP slightly improved in comparison to previous   - Does not appear that pt is in a Vtach rhythm, appears to be sinus tach. Additionally, pt has an AICD in place that has not fired as per the pt. Recommend AICD interrogation with Cardiology  - Obtain TSH, Cortisol level, A1c, and Lipid panel  - OOB to chair as tolerated  - PT evaluation  - Daily incentive spirometer use  - Continue with supplemental oxygen via NC to maintain SpO2 greater than or equal to 92%  - BiPAP nightly  - Admit to med-tele floor. 70 year old male pmhx afib on eliquis with aicd, cad, chf, dm, hld, htn presented to the ED with complaint of acute onset SOB and 10/10 chest pain since yesterday afternoon.    Well known to Dr Lucas from recent admission (and then AMA) Old records reviewed     # Acute pulmonary edema  Seen by ICU with rec's:  # CHF exacerbation.   ·  Recommendation: - Now titrated off of BiPAP and Nitroglycerin drip  - S/p 40mg of Lasix IV, Start Lasix 60mg IV q6 hours daily  - Start Coreg 25mg q12 hours daily  - Start Spironolactone 25mg daily  - Restart home regimen of Entresto  - Restart home regimen of Eliquis  - Echocardiogram  - Cardiology consult  - Monitor electrolytes and replete as needed to maintain K of 4, Mg of 2, and Phos of 3  - Repeat EKG, follow up results  - Trend trop to peak (current sample is hemolyzed)  - BNP slightly improved in comparison to previous   - Does not appear that pt is in a Vtach rhythm, appears to be sinus tach. Additionally, pt has an AICD in place that has not fired as per the pt. Recommend AICD interrogation with Cardiology  - Obtain TSH, Cortisol level, A1c, and Lipid panel  - OOB to chair as tolerated  - PT evaluation  - Daily incentive spirometer use  - Continue with supplemental oxygen via NC to maintain SpO2 greater than or equal to 92%  - BiPAP nightly  - Admit to med-tele floor    # HTN  ## HLD  - c/w home meds  - DASH diet  - monitor BP    # AFib  - c/w eliquis and amiodarone    # DM  - hold home meds  - FS monitoring  - ISS  70 year old male pmhx afib on eliquis with aicd, cad, HFmrEF, dm, hld, htn presented to the ED with complaint of acute onset SOB and 10/10 chest pain since yesterday afternoon.    Well known to Dr Lucas from recent admission (and then AMA) Old records reviewed     # Acute pulmonary edema  Seen by ICU with rec's:  # CHF exacerbation.   ·  Recommendation: - Now titrated off of BiPAP and Nitroglycerin drip  - S/p 40mg of Lasix IV, Start Lasix 60mg IV q6 hours daily  - Start Coreg 25mg q12 hours daily  - Start Spironolactone 25mg daily  - Restart home regimen of Entresto  - Restart home regimen of Eliquis  - Echocardiogram  - Cardiology consult  - Monitor electrolytes and replete as needed to maintain K of 4, Mg of 2, and Phos of 3  - Repeat EKG, follow up results  - Trend trop to peak (current sample is hemolyzed)  - BNP slightly improved in comparison to previous   - Does not appear that pt is in a Vtach rhythm, appears to be sinus tach. Additionally, pt has an AICD in place that has not fired as per the pt. Recommend AICD interrogation with Cardiology  - Obtain TSH, Cortisol level, A1c, and Lipid panel  - OOB to chair as tolerated  - PT evaluation  - Daily incentive spirometer use  - Continue with supplemental oxygen via NC to maintain SpO2 greater than or equal to 92%  - BiPAP nightly  - Admit to med-tele floor    # HTN  # HLD  - c/w home meds  - DASH diet  - monitor BP    # CAD s/p PCI  - c/w plavix    # AFib  - c/w eliquis and amiodarone    # DM  - hold home meds  - FS monitoring  - ISS  70 year old male pmhx afib on eliquis with aicd, cad, HFmrEF, dm, hld, htn presented to the ED with complaint of acute onset SOB and 10/10 chest pain since yesterday afternoon.    Well known to Dr Lucas from recent admission (and then AMA) Old records reviewed     # Acute pulmonary edema  Seen by ICU with rec's:  # CHF exacerbation.   ·  Recommendation: - Now titrated off of BiPAP and Nitroglycerin drip  - S/p 40mg of Lasix IV, Start Lasix 60mg IV q6 hours daily  - Start Coreg 25mg q12 hours daily  - Start Spironolactone 25mg daily  - Restart home regimen of Entresto  - Restart home regimen of Eliquis  - Echocardiogram  - Cardiology consult  - Monitor electrolytes and replete as needed to maintain K of 4, Mg of 2, and Phos of 3  - Repeat EKG, follow up results  - Trend trop to peak (current sample is hemolyzed)  - BNP slightly improved in comparison to previous   - Does not appear that pt is in a Vtach rhythm, appears to be sinus tach. Additionally, pt has an AICD in place that has not fired as per the pt. Recommend AICD interrogation with Cardiology  - Obtain TSH, Cortisol level, A1c, and Lipid panel  - OOB to chair as tolerated  - PT evaluation  - Daily incentive spirometer use  - Continue with supplemental oxygen via NC to maintain SpO2 greater than or equal to 92%  - BiPAP nightly  - Admit to med-tele floor    # Sepsis  - WBC 16.3, lactate >6 in ED, tachy  - c/w cefepime  - ID consult  - monitor for fever  - hold IVF, pt on IV lasix Q6H  - repeat lactate WNL, will repeat     # Elevated troponin  - trop 145, second trop was not drawn by ED  - will repeat troponin    # HTN  # HLD  - c/w home meds  - DASH diet  - monitor BP    # CAD s/p PCI  - c/w plavix    # AFib  - c/w eliquis and amiodarone    # DM  - hold home meds  - FS monitoring  - ISS  70 year old male pmhx afib on eliquis with aicd, cad, HFmrEF, dm, hld, htn presented to the ED with complaint of acute onset SOB and 10/10 chest pain since yesterday afternoon.    Well known to Dr Lucas from recent admission (and then AMA) Old records reviewed     # Acute pulmonary edema  Seen by ICU with rec's:  # CHF exacerbation.   ·  Recommendation: - Now titrated off of BiPAP and Nitroglycerin drip  - S/p 40mg of Lasix IV, Start Lasix 60mg IV q6 hours daily  - Start Coreg 25mg q12 hours daily  - Start Spironolactone 25mg daily  - Restart home regimen of Entresto  - Restart home regimen of Eliquis  - Echocardiogram  - Cardiology consult  - Monitor electrolytes and replete as needed to maintain K of 4, Mg of 2, and Phos of 3  - Repeat EKG, follow up results  - Trend trop to peak (current sample is hemolyzed)  - BNP slightly improved in comparison to previous   - Does not appear that pt is in a Vtach rhythm, appears to be sinus tach. Additionally, pt has an AICD in place that has not fired as per the pt. Recommend AICD interrogation with Cardiology  - Obtain TSH, Cortisol level, A1c, and Lipid panel  - OOB to chair as tolerated  - PT evaluation  - Daily incentive spirometer use  - Continue with supplemental oxygen via NC to maintain SpO2 greater than or equal to 92%  - BiPAP nightly  - Admit to med-tele floor    # Sepsis  - WBC 16.3, lactate >6 in ED, tachy  - c/w cefepime  - ID consult  - monitor for fever  - hold IVF, pt on IV lasix Q6H  - repeat lactate WNL, will repeat   - f/u Bcx    # Elevated troponin  - trop 145, second trop was not drawn by ED  - will repeat troponin    # HTN  # HLD  - c/w home meds  - DASH diet  - monitor BP    # CAD s/p PCI  - c/w plavix    # AFib  - c/w eliquis and amiodarone    # DM  - hold home meds  - FS monitoring  - ISS

## 2025-04-19 NOTE — CONSULT NOTE ADULT - NS ATTEND AMEND GEN_ALL_CORE FT
Patient seen and examined.  Agree with above.   - In brief, 70 year old male with history of HTN, HLD, CAD s/p MANN to mid LAD in 2021, ICM s/p AICD, AF on ac with eliquis admitted with chest pain and acute on chronic systolic heart failure.   - Pt. briefly placed on BIPAP in the ER but it was weaned off after diuresis  - Pt. with elevated troponin likely secondary to demand ischemia and underlying CAD  - Given CAD history and episodes of chest pain, will need LHC when able to lie flat and medically optimized - discussed with interventionalist on call at [facility] / Dr. Esparza - no urgent cath indicated at this time, will plan on LHC when medically optimized.   - Recommend IV diuresis to keep O > I   - Transfer to ICU for further workup and treatment   - Check TTE  - Trend cardiac enzymes including CPK   - Check labs today including lactate and LFTS's  - Lifelong ac for cva prevention   - check AICD interrogation   - Plans for cath when medically optimized     Armando Herron MD

## 2025-04-19 NOTE — H&P ADULT - HISTORY OF PRESENT ILLNESS
70 year old male pmhx afib with aicd, cad, chf, dm, hld, htn presented to the ED with complaint of acute onset SOB and chest pain since earlier yesterday.  As per EMS, the pt was having runs of VT. Upon ED presentation, the pt was noted to be in sinus tachycardia rhythm and with associated hypoxia. The pt was started on BiPAP and placed on a nitroglycerin drip. Otherwise denies any fever, chills, headache, changes in vision, cough, congestion, n/v/d, abd pain, constipation, urinary complaints, lower extremity pain/swelling.    Upon critical care assessment, the pt is awake and alert and answering all questions appropriately. The pt was asking for something to eat. There was significant artifact noted on the cardiac monitor and therefore EKG was requested to assess the pt's HR accurately. The pt was transitioned off of BiPAP and on to NC without any adverse effects. Nitro drip was also titrated off. No other complaints or concerns noted at this time. 70 year old male pmhx afib on eliquis with aicd, cad, chf, dm, hld, htn presented to the ED with complaint of acute onset SOB and 10/10 chest pain since yesterday afternoon. States his AICD fired and jolted him.  As per EMS, the pt was having runs of VT. Upon ED presentation, the pt was noted to be in sinus tachycardia rhythm and with associated hypoxia. The pt was started on BiPAP and placed on a nitroglycerin drip. Pt states he is feeling better now, CP is 0/10 and is able to take deeper breaths now which he couldnt do prior. Otherwise denies any fever, chills, headache, changes in vision, cough, congestion, n/v/d, abd pain, constipation, urinary complaints, lower extremity pain/swelling.  Pt laying in bed comfortably and in good spirits joking and pleasant.  70 year old male pmhx afib on eliquis with aicd, cad, chf, dm, hld, htn presented to the ED with complaint of acute onset SOB and 10/10 chest pain since yesterday afternoon.  As per EMS, the pt was having runs of VT. Upon ED presentation, the pt was noted to be in sinus tachycardia rhythm and with associated hypoxia. The pt was started on BiPAP and placed on a nitroglycerin drip. Pt states he is feeling better now, CP is 0/10 and is able to take deeper breaths now which he couldnt do prior. Otherwise denies any fever, chills, headache, changes in vision, cough, congestion, n/v/d, abd pain, constipation, urinary complaints, lower extremity pain/swelling.  Pt laying in bed comfortably and in good spirits joking and pleasant.

## 2025-04-19 NOTE — H&P ADULT - NSHPPHYSICALEXAM_GEN_ALL_CORE
VITALS:   T(C): 36.6 (04-18-25 @ 22:04), Max: 36.6 (04-18-25 @ 22:04)  HR: 98 (04-19-25 @ 05:38) (92 - 147)  BP: 145/73 (04-19-25 @ 05:38) (112/59 - 188/106)  RR: 20 (04-18-25 @ 22:04) (20 - 20)  SpO2: 99% (04-18-25 @ 22:04) (99% - 99%)    GENERAL: NAD, lying in bed comfortably and pleasant  HEAD:  Atraumatic, Normocephalic  EYES: EOMI, conjunctiva and sclera clear  ENT: Moist mucous membranes  NECK: Supple, No JVD  CHEST/LUNG: + mild crackles, Unlabored respirations, + AICD left upper chest wall  HEART: Regular rate and rhythm; No murmurs  ABDOMEN: obese, BS present; Soft, Nontender, Nondistended  EXTREMITIES:  No clubbing, cyanosis, or edema  NERVOUS SYSTEM:  Alert & Oriented X3, speech clear. No deficits   MSK: FROM all 4 extremities, full and equal strength  SKIN: No rashes or lesions

## 2025-04-19 NOTE — PROGRESS NOTE ADULT - ASSESSMENT
69 yo male with h/o heart failure, afib, PPM presented with acute onset of dyspnea. Found to have pulmonary edema, likely 2/2 acute heart failure exacerbation. s/p IV lasix, biPAP. Symptoms have persisted. Was evaluated by cardiology, would transfer to critical care unit for further management.    Acute heart failure exacerbation  -diurese, replete K  -hold metoprolol and coreg  -pending ECHO  -transfer to critical care unit, cardiology following    Elevated troponin  -low suspicious for ACS  -likely demand ischemia or mild cardiomyocyte injury in the setting of heart failure    Paroxysmal afib  PPM  -on eliquis    SIRS  -given acute onset of SOB, heart failure, doubt sepsis is the culprit  -elevated LA likely 2/2 tissue hypoperfusion  -was started cefepime empirically, defer to ID for abx management

## 2025-04-19 NOTE — CONSULT NOTE ADULT - PROBLEM SELECTOR RECOMMENDATION 9
- Now titrated off of BiPAP and Nitroglycerin drip  - S/p 40mg of Lasix IV, Start Lasix 60mg IV q6 hours daily  - Start Coreg 25mg q12 hours daily  - Start Spironolactone 25mg daily  - Restart home regimen of Entresto  - Restart home regimen of Eliquis  - Echocardiogram  - Cardiology consult  - Monitor electrolytes and replete as needed to maintain K of 4, Mg of 2, and Phos of 3  - Repeat EKG, follow up results  - Trend trop to peak (current sample is hemolyzed)  - BNP slightly improved in comparison to previous   - Does not appear that pt is in a Vtach rhythm, appears to be sinus tach. Additionally, pt has an AICD in place that has not fired as per the pt. Recommend AICD interrogation with Cardiology  - Obtain TSH, Cortisol level, A1c, and Lipid panel  - OOB to chair as tolerated  - PT evaluation  - Daily incentive spirometer use  - Continue with supplemental oxygen via NC to maintain SpO2 greater than or equal to 92%  - BiPAP nightly  - Admit to med-[facility] floor

## 2025-04-19 NOTE — CHART NOTE - NSCHARTNOTEFT_GEN_A_CORE
Patient c/o chest burning as per ICU resident.  Consider starting NTG gtt if BP permits.  Repeat EKG  Troponin 145--->166  [facility]/ accepted by Dr. Esparza  Discussed with Dr. Herron   ICU resident/ and Attending Dr. Adhikari aware of plan Patient c/o chest burning as per ICU resident.  Consider starting NTG gtt if BP permits.  Repeat EKG  Troponin 145--->166  Transfer to [place]/ Stepdown approved/ accepted by Dr. Esparza  Would hold Eliquis and start IV Heparin if no contraindications  Discussed with Dr. Herron   Discussed with ICU resident Dr. Padilla  Attending Dr. Adhikari aware of transfer to [facility]

## 2025-04-19 NOTE — ED PROVIDER NOTE - OBJECTIVE STATEMENT
Patient is a 70y male pmhx afib with aicd cad chf dm hld htn p/w chest pain and sob since earlier today. Otherwise denies any fever, chills, headache, changes in vision, cough, congestion, n/v/d, abd pain, constipation, urinary complaints, lower extremity pain/swelling.

## 2025-04-19 NOTE — CHART NOTE - NSCHARTNOTEFT_GEN_A_CORE
Patient revisited in the ER, he is breathing easily even while talking. See telephone encounter Patient revisited in the ER, he is breathing easily even while talking. Appreciate critical care input

## 2025-04-19 NOTE — PHYSICAL THERAPY INITIAL EVALUATION ADULT - SPECIFY REASON(S)
As per FILOMENA [place]l, pt is on bipap, just was upgraded to the unit, pt is also SOB. Please hold until pt is more stable.

## 2025-04-19 NOTE — ED PROVIDER NOTE - PHYSICAL EXAMINATION
CONSTITUTIONAL: severe distress, tripoding   SKIN: diaphoretic  HEAD: NCAT  EYES: EOMI, PERRLA, no scleral icterus, conjunctiva pink  ENT: normal pharynx with no erythema or exudates  NECK: Supple; non tender. Full ROM.  CARD: RRR, no murmurs.  RESP: crackles diffusely  ABD: soft, non-tender,   EXT: Full ROM,   NEURO: normal motor. normal sensory.   PSYCH: Cooperative, appropriate.

## 2025-04-19 NOTE — ED PROVIDER NOTE - PROGRESS NOTE DETAILS
pk: sepsis protocol initiated, pt placed on bipap, 1000mcg nitro given and pt placed on drip, magnesium and amio ordered for possible v tach unsustained, call placed to ICU after labs imaging reviewed and pt cleared for med tele.

## 2025-04-19 NOTE — ED PROVIDER NOTE - CLINICAL SUMMARY MEDICAL DECISION MAKING FREE TEXT BOX
Patient is a 70y male pmhx afib with aicd cad chf dm hld htn p/w chest pain and sob since earlier today. Otherwise denies any fever, chills, headache, changes in vision, cough, congestion, n/v/d, abd pain, constipation, urinary complaints, lower extremity pain/swelling.  Patient was recently admitted for similar complaints he signed out ama prior to completing treatment     on exam the patient is nc/at perrla eomi oropharynx clear chest reveals wheezing with rhoncii b/l, abd-soft nt nd bs+ ext from radial pulses 2 +=, pedal pulses 2 +=     a/p- we obtained ekg per my independent evaluation not consistent with stemi however, I do note pacermaker function is adequate, we obtained cxr which per my independent evaluation is not consistent with ptx Was having runs of wide-complex tachycardia however on our monitor no evidence of that at this time patient was given IV magnesium patient was started on BiPAP in edition started on nitro drip and was weaned off he improved patient's shortness of breath improved pain improved nevertheless I would still admit we consulted ICU who evaluated patient in the emergency department I will admit at this time

## 2025-04-20 LAB
ALBUMIN SERPL ELPH-MCNC: 3.1 G/DL — LOW (ref 3.5–5.2)
ALP SERPL-CCNC: 95 U/L — SIGNIFICANT CHANGE UP (ref 30–115)
ALT FLD-CCNC: 13 U/L — SIGNIFICANT CHANGE UP (ref 0–41)
ANION GAP SERPL CALC-SCNC: 13 MMOL/L — SIGNIFICANT CHANGE UP (ref 7–14)
APTT BLD: 35 SEC — SIGNIFICANT CHANGE UP (ref 27–39.2)
APTT BLD: 48.5 SEC — HIGH (ref 27–39.2)
APTT BLD: >200 SEC — CRITICAL HIGH (ref 27–39.2)
AST SERPL-CCNC: 50 U/L — HIGH (ref 0–41)
BILIRUB SERPL-MCNC: 0.5 MG/DL — SIGNIFICANT CHANGE UP (ref 0.2–1.2)
BUN SERPL-MCNC: 27 MG/DL — HIGH (ref 10–20)
CALCIUM SERPL-MCNC: 8.7 MG/DL — SIGNIFICANT CHANGE UP (ref 8.4–10.5)
CHLORIDE SERPL-SCNC: 97 MMOL/L — LOW (ref 98–110)
CO2 SERPL-SCNC: 23 MMOL/L — SIGNIFICANT CHANGE UP (ref 17–32)
CORTIS SERPL-MCNC: 21.1 UG/DL — SIGNIFICANT CHANGE UP
CREAT SERPL-MCNC: 1.4 MG/DL — SIGNIFICANT CHANGE UP (ref 0.7–1.5)
EGFR: 54 ML/MIN/1.73M2 — LOW
EGFR: 54 ML/MIN/1.73M2 — LOW
GLUCOSE BLDC GLUCOMTR-MCNC: 237 MG/DL — HIGH (ref 70–99)
GLUCOSE BLDC GLUCOMTR-MCNC: 246 MG/DL — HIGH (ref 70–99)
GLUCOSE BLDC GLUCOMTR-MCNC: 280 MG/DL — HIGH (ref 70–99)
GLUCOSE BLDC GLUCOMTR-MCNC: 330 MG/DL — HIGH (ref 70–99)
GLUCOSE SERPL-MCNC: 332 MG/DL — HIGH (ref 70–99)
HCT VFR BLD CALC: 33.7 % — LOW (ref 42–52)
HGB BLD-MCNC: 11.2 G/DL — LOW (ref 14–18)
MCHC RBC-ENTMCNC: 28.1 PG — SIGNIFICANT CHANGE UP (ref 27–31)
MCHC RBC-ENTMCNC: 33.2 G/DL — SIGNIFICANT CHANGE UP (ref 32–37)
MCV RBC AUTO: 84.5 FL — SIGNIFICANT CHANGE UP (ref 80–94)
NRBC BLD AUTO-RTO: 0 /100 WBCS — SIGNIFICANT CHANGE UP (ref 0–0)
PLATELET # BLD AUTO: 456 K/UL — HIGH (ref 130–400)
PMV BLD: 10.7 FL — HIGH (ref 7.4–10.4)
POTASSIUM SERPL-MCNC: 5.7 MMOL/L — HIGH (ref 3.5–5)
POTASSIUM SERPL-SCNC: 5.7 MMOL/L — HIGH (ref 3.5–5)
PROT SERPL-MCNC: 6.6 G/DL — SIGNIFICANT CHANGE UP (ref 6–8)
RBC # BLD: 3.99 M/UL — LOW (ref 4.7–6.1)
RBC # FLD: 13.7 % — SIGNIFICANT CHANGE UP (ref 11.5–14.5)
SODIUM SERPL-SCNC: 133 MMOL/L — LOW (ref 135–146)
WBC # BLD: 14.77 K/UL — HIGH (ref 4.8–10.8)
WBC # FLD AUTO: 14.77 K/UL — HIGH (ref 4.8–10.8)

## 2025-04-20 PROCEDURE — 99291 CRITICAL CARE FIRST HOUR: CPT

## 2025-04-20 PROCEDURE — 93306 TTE W/DOPPLER COMPLETE: CPT | Mod: 26

## 2025-04-20 PROCEDURE — 93010 ELECTROCARDIOGRAM REPORT: CPT

## 2025-04-20 PROCEDURE — 71045 X-RAY EXAM CHEST 1 VIEW: CPT | Mod: 26

## 2025-04-20 RX ORDER — SODIUM ZIRCONIUM CYCLOSILICATE 5 G/5G
10 POWDER, FOR SUSPENSION ORAL ONCE
Refills: 0 | Status: COMPLETED | OUTPATIENT
Start: 2025-04-20 | End: 2025-04-20

## 2025-04-20 RX ORDER — SACUBITRIL AND VALSARTAN 6; 6 MG/1; MG/1
1 PELLET ORAL
Refills: 0 | Status: DISCONTINUED | OUTPATIENT
Start: 2025-04-20 | End: 2025-04-25

## 2025-04-20 RX ADMIN — Medication 1 APPLICATION(S): at 06:10

## 2025-04-20 RX ADMIN — INSULIN LISPRO 3: 100 INJECTION, SOLUTION INTRAVENOUS; SUBCUTANEOUS at 17:19

## 2025-04-20 RX ADMIN — BUMETANIDE 2 MILLIGRAM(S): 1 TABLET ORAL at 06:13

## 2025-04-20 RX ADMIN — HEPARIN SODIUM 1600 UNIT(S)/HR: 1000 INJECTION INTRAVENOUS; SUBCUTANEOUS at 11:39

## 2025-04-20 RX ADMIN — ATORVASTATIN CALCIUM 40 MILLIGRAM(S): 80 TABLET, FILM COATED ORAL at 22:10

## 2025-04-20 RX ADMIN — INSULIN LISPRO 4: 100 INJECTION, SOLUTION INTRAVENOUS; SUBCUTANEOUS at 11:48

## 2025-04-20 RX ADMIN — SACUBITRIL AND VALSARTAN 1 TABLET(S): 6; 6 PELLET ORAL at 17:19

## 2025-04-20 RX ADMIN — METOPROLOL SUCCINATE 25 MILLIGRAM(S): 50 TABLET, EXTENDED RELEASE ORAL at 06:10

## 2025-04-20 RX ADMIN — AMIODARONE HYDROCHLORIDE 200 MILLIGRAM(S): 50 INJECTION, SOLUTION INTRAVENOUS at 06:10

## 2025-04-20 RX ADMIN — HEPARIN SODIUM 6500 UNIT(S): 1000 INJECTION INTRAVENOUS; SUBCUTANEOUS at 13:35

## 2025-04-20 RX ADMIN — NITROGLYCERIN 1.5 MICROGRAM(S)/MIN: 20 OINTMENT TOPICAL at 07:45

## 2025-04-20 RX ADMIN — CLOPIDOGREL BISULFATE 75 MILLIGRAM(S): 75 TABLET, FILM COATED ORAL at 11:18

## 2025-04-20 RX ADMIN — HEPARIN SODIUM 1600 UNIT(S)/HR: 1000 INJECTION INTRAVENOUS; SUBCUTANEOUS at 22:17

## 2025-04-20 RX ADMIN — BUMETANIDE 2 MILLIGRAM(S): 1 TABLET ORAL at 17:19

## 2025-04-20 RX ADMIN — SODIUM ZIRCONIUM CYCLOSILICATE 10 GRAM(S): 5 POWDER, FOR SUSPENSION ORAL at 15:26

## 2025-04-20 RX ADMIN — HEPARIN SODIUM 1600 UNIT(S)/HR: 1000 INJECTION INTRAVENOUS; SUBCUTANEOUS at 06:22

## 2025-04-20 RX ADMIN — HEPARIN SODIUM 1900 UNIT(S)/HR: 1000 INJECTION INTRAVENOUS; SUBCUTANEOUS at 13:32

## 2025-04-20 RX ADMIN — HEPARIN SODIUM 0 UNIT(S)/HR: 1000 INJECTION INTRAVENOUS; SUBCUTANEOUS at 21:08

## 2025-04-20 RX ADMIN — INSULIN LISPRO 2: 100 INJECTION, SOLUTION INTRAVENOUS; SUBCUTANEOUS at 07:47

## 2025-04-20 RX ADMIN — SACUBITRIL AND VALSARTAN 1 TABLET(S): 6; 6 PELLET ORAL at 06:10

## 2025-04-20 NOTE — PROGRESS NOTE ADULT - SUBJECTIVE AND OBJECTIVE BOX
SUBJECTIVE/OVERNIGHT EVENTS  Today is hospital day 1d. This morning patient was seen and examined at bedside, resting comfortably in bed.  AOx4, vitals wnl on RA. No catheters or lines inserted.  No acute or major events overnight.    HPI:  70 year old male pmhx afib on eliquis with aicd, cad, chf, dm, hld, htn presented to the ED with complaint of acute onset SOB and 10/10 chest pain since yesterday afternoon.  As per EMS, the pt was having runs of VT. Upon ED presentation, the pt was noted to be in sinus tachycardia rhythm and with associated hypoxia. The pt was started on BiPAP and placed on a nitroglycerin drip. Pt states he is feeling better now, CP is 0/10 and is able to take deeper breaths now which he couldnt do prior. Otherwise denies any fever, chills, headache, changes in vision, cough, congestion, n/v/d, abd pain, constipation, urinary complaints, lower extremity pain/swelling.  Pt laying in bed comfortably and in good spirits joking and pleasant.  (19 Apr 2025 05:34)      VITALS  T(F): 98.3 (04-20-25 @ 07:16), Max: 98.7 (04-19-25 @ 12:50)  HR: 98 (04-20-25 @ 12:00) (81 - 103)  BP: 140/82 (04-20-25 @ 12:00) (104/66 - 154/80)  RR: 18 (04-20-25 @ 11:22) (18 - 45)  SpO2: 88% (04-20-25 @ 12:00) (85% - 97%)  POCT Blood Glucose.: 330 mg/dL (04-20-25 @ 11:00)  POCT Blood Glucose.: 246 mg/dL (04-20-25 @ 07:46)  POCT Blood Glucose.: 337 mg/dL (04-19-25 @ 17:16)          PHYSICAL EXAM      GENERAL: NAD, irritated  ENT: Moist mucous membranes  NECK: Supple, No JVD  CHEST/LUNG: + mild crackles, Unlabored respirations, + AICD left upper chest wall  HEART: Regular rate and rhythm; No murmurs  ABDOMEN: obese, BS present; Soft, Nontender, Nondistended  EXTREMITIES:  No clubbing, cyanosis. Mild LE edema.  NERVOUS SYSTEM:  Alert & Oriented X3, speech clear. No deficits   SKIN: No rashes or lesions        MEDICATIONS  STANDING MEDICATIONS  aMIOdarone    Tablet 200 milliGRAM(s) Oral daily  atorvastatin 40 milliGRAM(s) Oral at bedtime  buMETAnide Injectable 2 milliGRAM(s) IV Push every 12 hours  chlorhexidine 2% Cloths 1 Application(s) Topical <User Schedule>  clopidogrel Tablet 75 milliGRAM(s) Oral daily  heparin  Infusion.  Unit(s)/Hr IV Continuous <Continuous>  insulin lispro (ADMELOG) corrective regimen sliding scale   SubCutaneous three times a day before meals  metoprolol succinate ER 25 milliGRAM(s) Oral daily  nitroglycerin  Infusion 5 MICROgram(s)/Min IV Continuous <Continuous>    PRN MEDICATIONS  acetaminophen     Tablet .. 650 milliGRAM(s) Oral every 6 hours PRN  aluminum hydroxide/magnesium hydroxide/simethicone Suspension 30 milliLiter(s) Oral every 4 hours PRN  heparin   Injectable 6500 Unit(s) IV Push every 6 hours PRN  ondansetron Injectable 4 milliGRAM(s) IV Push every 8 hours PRN  zolpidem 5 milliGRAM(s) Oral at bedtime PRN        PAST MEDICAL & SURGICAL HISTORY:  CHF (congestive heart failure)      Diabetes      CAD (coronary artery disease)      Chronic obstructive pulmonary disease (COPD)      HTN (hypertension)      Stented coronary artery      Dyslipidemia      GERD (gastroesophageal reflux disease)      Afib      CVA (cerebral vascular accident)      H/O heart artery stent      Heart valve replaced  aortic      History of Nissen fundoplication          LABS             11.2   14.77 )-----------( 456      ( 04-20-25 @ 05:25 )             33.7     134  |  97  |  26  -------------------------<  301   04-19-25 @ 11:44  4.2  |  25  |  1.7    Ca      9.4     04-19-25 @ 11:44  Mg     2.7     04-19-25 @ 11:44    TPro  6.3  /  Alb  3.5  /  TBili  0.5  /  DBili  x   /  AST  11  /  ALT  11  /  AlkPhos  101  /  GGT  x     04-19-25 @ 11:44    PTT - ( 04-20-25 @ 12:03 )  PTT:35.0 sec    Troponin T, High Sensitivity Result: 162 ng/L (04-19-25 @ 16:55)  Troponin T, High Sensitivity Result: 166 ng/L (04-19-25 @ 11:44)  Pro-Brain Natriuretic Peptide: 3684 pg/mL (04-19-25 @ 11:44)    Urinalysis Basic - ( 19 Apr 2025 11:44 )    Color: x / Appearance: x / SG: x / pH: x  Gluc: 301 mg/dL / Ketone: x  / Bili: x / Urobili: x   Blood: x / Protein: x / Nitrite: x   Leuk Esterase: x / RBC: x / WBC x   Sq Epi: x / Non Sq Epi: x / Bacteria: x          IMAGING

## 2025-04-20 NOTE — PROGRESS NOTE ADULT - ASSESSMENT
69 yo male with h/o heart failure, afib, PPM presented with acute onset of dyspnea. Found to have pulmonary edema, likely 2/2 acute heart failure exacerbation. s/p IV lasix, biPAP. Symptoms have persisted. Was evaluated by cardiology, would transfer to critical care unit for further management.    #Acute on chronic HFrEF exacerbation  - BNP 3.6k  - CXR 4/18: Bilateral opacities, congested.  -diurese, replete K  -hold metoprolol and coreg  -pending ECHO  -transfer to critical care unit, cardiology following    Elevated troponin  -low suspicious for ACS  -likely demand ischemia or mild cardiomyocyte injury in the setting of heart failure    Paroxysmal afib  PPM  -on eliquis    SIRS  -given acute onset of SOB, heart failure, doubt sepsis is the culprit  -elevated LA likely 2/2 tissue hypoperfusion  -was started cefepime empirically, defer to ID for abx management 69 yo male with h/o heart failure, afib, PPM presented with acute onset of dyspnea. Found to have pulmonary edema, likely 2/2 acute heart failure exacerbation. s/p IV lasix, biPAP. Symptoms have persisted. Was evaluated by cardiology, would transfer to critical care unit for further management.    #Acute on chronic HFrEF exacerbation  #Type 2 NSTEMI  - BNP 3.6k, trop peak 166 >>162  - Lactate 6.4 >> 1.8  - CXR 4/18: Bilateral opacities, congested.  - C/w IV Bumex 2mg BID  -hold coreg  -Echo 4/20 noted. EF 30%  - Daily wt, strict I/O  -transfer to critical care unit, cardiology following    #Agitation, delirium?  - Patient consistently agitated, pulling out IV line and screaming at staff.   - Constant obs 1:1, if worsens may consider pharmacologic intervention    #SIRS  -given acute onset of SOB, heart failure, doubt sepsis is the culprit  -elevated LA likely 2/2 tissue hypoperfusion  -was started cefepime empirically, defer to ID for abx management    #Paroxysmal afib  #PPM  -Eliquis held.   -C/w Heparin infusion  -Metoprolol succinate ER 25mg, amiodarone     #CAD  - C/w clopidogrel atorvastatin     71 yo male with h/o heart failure, afib, PPM presented with acute onset of dyspnea. Found to have pulmonary edema, likely 2/2 acute heart failure exacerbation. s/p IV lasix, biPAP. Symptoms have persisted. Was evaluated by cardiology, would transfer to critical care unit for further management.    #Acute on chronic HFrEF exacerbation  #Type 2 NSTEMI  - BNP 3.6k, trop peak 166 >>162  - Lactate 6.4 >> 1.8  - CXR 4/18: Bilateral opacities, congested.  - C/w IV Bumex 2mg BID  -hold coreg  -Echo 4/20 noted. EF 30%  - Daily wt, strict I/O  -transfer to critical care unit, cardiology following    #Agitation, delirium?  - Patient consistently agitated, pulling out IV line and screaming at staff.   - Constant obs 1:1, if worsens may consider pharmacologic intervention    #SIRS present on admission - Less likely infection  -was started cefepime empirically. D/C abx  - ID recs appreciated: Clinical picture not consistent with sepsis.  - Follow up with blood cultures.    #Paroxysmal afib  #PPM  -Eliquis held.   -C/w Heparin infusion  -Metoprolol succinate ER 25mg, amiodarone     #CAD  - C/w clopidogrel atorvastatin    -------------------------------------------------------------------------------------------------------------------------------------  #DVT PPX: Heparin  #GI PPX: Pantoprazole  #Diet: Dash   #Code Status: full  #Activity Order: IAT    #Handoff     71 yo male with h/o heart failure, afib, PPM presented with acute onset of dyspnea. Found to have pulmonary edema, likely 2/2 acute heart failure exacerbation. s/p IV lasix, biPAP. Symptoms have persisted. Was evaluated by cardiology, would transfer to critical care unit for further management.    #Acute on chronic HFrEF exacerbation  #Hypertensive emergancy  #Type 2 NSTEMI  - BNP 3.6k, trop peak 166 >>162  - Lactate 6.4 >> 1.8  - CXR 4/18: Bilateral opacities, congested.  - C/w IV Bumex 2mg BID  -  4/20 pt pulled out IV, no longer on nitro drip  - hold coreg  -Echo 4/20 noted. EF 30%  - Daily wt, strict I/O  -transfer to critical care unit, cardiology following    #Agitation, delirium?  - Patient consistently agitated, pulling out IV line and screaming at staff.   - Constant obs 1:1, if worsens may consider pharmacologic intervention    #SIRS present on admission - Less likely infection  -was started cefepime empirically. D/C abx  - ID recs appreciated: Clinical picture not consistent with sepsis.  - Follow up with blood cultures.    #Paroxysmal afib  #PPM  -Eliquis held.   -C/w Heparin infusion  -Metoprolol succinate ER 25mg, amiodarone     #CAD  - C/w clopidogrel atorvastatin    -------------------------------------------------------------------------------------------------------------------------------------  #DVT PPX: Heparin  #GI PPX: Pantoprazole  #Diet: Dash   #Code Status: full  #Activity Order: IAT    #Handoff     71 yo male with h/o heart failure, afib, PPM presented with acute onset of dyspnea. Found to have pulmonary edema, likely 2/2 acute heart failure exacerbation. s/p IV lasix, biPAP.  #Acute on chronic HFrEF exacerbation  #Hypertensive emergancy  # NSTEMI  - BNP 3.6k, trop peak 166 >>162  - Lactate 6.4 >> 1.8  - CXR 4/18: Bilateral opacities, congested.  - C/w IV Bumex 2mg BID  -  4/20 pt pulled out IV, no longer on nitro drip  - hold coreg  -Echo 4/20 noted. EF 30%  - Daily wt, strict I/O  -transfer to critical care unit, cardiology following    #Agitation,   - Patient consistently agitated, pulling out IV line and screaming at staff.   - Constant obs 1:1, if worsens may consider pharmacologic intervention    #SIRS present on admission - Less likely infection  -was started cefepime empirically. D/C abx  - ID recs appreciated: Clinical picture not consistent with sepsis.  - Follow up with blood cultures.    #Paroxysmal afib  #PPM  -Eliquis held.   -C/w Heparin infusion  -Metoprolol succinate ER 25mg, amiodarone     #CAD  - C/w clopidogrel atorvastatin    -------------------------------------------------------------------------------------------------------------------------------------  #DVT PPX: Heparin  #GI PPX: Pantoprazole  #Diet: Dash   #Code Status: full  #Activity Order: IAT    #Handoff

## 2025-04-21 LAB
APTT BLD: 141.6 SEC — CRITICAL HIGH (ref 27–39.2)
APTT BLD: 50.8 SEC — HIGH (ref 27–39.2)
BASOPHILS # BLD AUTO: 0.1 K/UL — SIGNIFICANT CHANGE UP (ref 0–0.2)
BASOPHILS NFR BLD AUTO: 0.7 % — SIGNIFICANT CHANGE UP (ref 0–1)
BUN SERPL-MCNC: 22 MG/DL — HIGH (ref 10–20)
CALCIUM SERPL-MCNC: 8.8 MG/DL — SIGNIFICANT CHANGE UP (ref 8.4–10.5)
CHLORIDE SERPL-SCNC: 96 MMOL/L — LOW (ref 98–110)
CO2 SERPL-SCNC: 27 MMOL/L — SIGNIFICANT CHANGE UP (ref 17–32)
CREAT SERPL-MCNC: 1.2 MG/DL — SIGNIFICANT CHANGE UP (ref 0.7–1.5)
EGFR: 65 ML/MIN/1.73M2 — SIGNIFICANT CHANGE UP
EGFR: 65 ML/MIN/1.73M2 — SIGNIFICANT CHANGE UP
EOSINOPHIL # BLD AUTO: 0.45 K/UL — SIGNIFICANT CHANGE UP (ref 0–0.7)
EOSINOPHIL NFR BLD AUTO: 2.9 % — SIGNIFICANT CHANGE UP (ref 0–8)
GLUCOSE BLDC GLUCOMTR-MCNC: 305 MG/DL — HIGH (ref 70–99)
GLUCOSE BLDC GLUCOMTR-MCNC: 326 MG/DL — HIGH (ref 70–99)
GLUCOSE BLDC GLUCOMTR-MCNC: 330 MG/DL — HIGH (ref 70–99)
GLUCOSE SERPL-MCNC: 297 MG/DL — HIGH (ref 70–99)
HCT VFR BLD CALC: 37.2 % — LOW (ref 42–52)
HCT VFR BLD CALC: 37.6 % — LOW (ref 42–52)
HGB BLD-MCNC: 12.1 G/DL — LOW (ref 14–18)
HGB BLD-MCNC: 12.3 G/DL — LOW (ref 14–18)
IMM GRANULOCYTES NFR BLD AUTO: 0.7 % — HIGH (ref 0.1–0.3)
LYMPHOCYTES # BLD AUTO: 1.73 K/UL — SIGNIFICANT CHANGE UP (ref 1.2–3.4)
LYMPHOCYTES # BLD AUTO: 11.2 % — LOW (ref 20.5–51.1)
MAGNESIUM SERPL-MCNC: 1.6 MG/DL — LOW (ref 1.8–2.4)
MCHC RBC-ENTMCNC: 27.6 PG — SIGNIFICANT CHANGE UP (ref 27–31)
MCHC RBC-ENTMCNC: 27.7 PG — SIGNIFICANT CHANGE UP (ref 27–31)
MCHC RBC-ENTMCNC: 32.5 G/DL — SIGNIFICANT CHANGE UP (ref 32–37)
MCHC RBC-ENTMCNC: 32.7 G/DL — SIGNIFICANT CHANGE UP (ref 32–37)
MCV RBC AUTO: 84.5 FL — SIGNIFICANT CHANGE UP (ref 80–94)
MCV RBC AUTO: 85.1 FL — SIGNIFICANT CHANGE UP (ref 80–94)
MONOCYTES # BLD AUTO: 1.24 K/UL — HIGH (ref 0.1–0.6)
MONOCYTES NFR BLD AUTO: 8.1 % — SIGNIFICANT CHANGE UP (ref 1.7–9.3)
MRSA PCR RESULT.: POSITIVE
NEUTROPHILS # BLD AUTO: 11.76 K/UL — HIGH (ref 1.4–6.5)
NEUTROPHILS NFR BLD AUTO: 76.4 % — HIGH (ref 42.2–75.2)
NRBC BLD AUTO-RTO: 0 /100 WBCS — SIGNIFICANT CHANGE UP (ref 0–0)
NRBC BLD AUTO-RTO: 0 /100 WBCS — SIGNIFICANT CHANGE UP (ref 0–0)
PLATELET # BLD AUTO: 435 K/UL — HIGH (ref 130–400)
PLATELET # BLD AUTO: 448 K/UL — HIGH (ref 130–400)
PMV BLD: 10.6 FL — HIGH (ref 7.4–10.4)
PMV BLD: 10.9 FL — HIGH (ref 7.4–10.4)
POTASSIUM SERPL-MCNC: 3.4 MMOL/L — LOW (ref 3.5–5)
POTASSIUM SERPL-SCNC: 3.4 MMOL/L — LOW (ref 3.5–5)
RBC # BLD: 4.37 M/UL — LOW (ref 4.7–6.1)
RBC # BLD: 4.45 M/UL — LOW (ref 4.7–6.1)
RBC # FLD: 13.6 % — SIGNIFICANT CHANGE UP (ref 11.5–14.5)
RBC # FLD: 14 % — SIGNIFICANT CHANGE UP (ref 11.5–14.5)
SODIUM SERPL-SCNC: 135 MMOL/L — SIGNIFICANT CHANGE UP (ref 135–146)
WBC # BLD: 15.38 K/UL — HIGH (ref 4.8–10.8)
WBC # BLD: 16.57 K/UL — HIGH (ref 4.8–10.8)
WBC # FLD AUTO: 15.38 K/UL — HIGH (ref 4.8–10.8)
WBC # FLD AUTO: 16.57 K/UL — HIGH (ref 4.8–10.8)

## 2025-04-21 PROCEDURE — 99222 1ST HOSP IP/OBS MODERATE 55: CPT

## 2025-04-21 PROCEDURE — 99233 SBSQ HOSP IP/OBS HIGH 50: CPT

## 2025-04-21 RX ORDER — INSULIN LISPRO 100 U/ML
5 INJECTION, SOLUTION INTRAVENOUS; SUBCUTANEOUS
Refills: 0 | Status: DISCONTINUED | OUTPATIENT
Start: 2025-04-21 | End: 2025-04-23

## 2025-04-21 RX ORDER — INSULIN GLARGINE-YFGN 100 [IU]/ML
9 INJECTION, SOLUTION SUBCUTANEOUS AT BEDTIME
Refills: 0 | Status: DISCONTINUED | OUTPATIENT
Start: 2025-04-21 | End: 2025-04-21

## 2025-04-21 RX ORDER — MELATONIN 5 MG
1 TABLET ORAL ONCE
Refills: 0 | Status: COMPLETED | OUTPATIENT
Start: 2025-04-21 | End: 2025-04-21

## 2025-04-21 RX ORDER — ENOXAPARIN SODIUM 100 MG/ML
80 INJECTION SUBCUTANEOUS EVERY 12 HOURS
Refills: 0 | Status: DISCONTINUED | OUTPATIENT
Start: 2025-04-21 | End: 2025-04-25

## 2025-04-21 RX ORDER — SPIRONOLACTONE 25 MG
25 TABLET ORAL EVERY 24 HOURS
Refills: 0 | Status: DISCONTINUED | OUTPATIENT
Start: 2025-04-21 | End: 2025-04-25

## 2025-04-21 RX ORDER — INSULIN GLARGINE-YFGN 100 [IU]/ML
6 INJECTION, SOLUTION SUBCUTANEOUS ONCE
Refills: 0 | Status: COMPLETED | OUTPATIENT
Start: 2025-04-21 | End: 2025-04-21

## 2025-04-21 RX ORDER — MAGNESIUM SULFATE 500 MG/ML
2 SYRINGE (ML) INJECTION
Refills: 0 | Status: COMPLETED | OUTPATIENT
Start: 2025-04-21 | End: 2025-04-21

## 2025-04-21 RX ORDER — INSULIN LISPRO 100 U/ML
3 INJECTION, SOLUTION INTRAVENOUS; SUBCUTANEOUS
Refills: 0 | Status: DISCONTINUED | OUTPATIENT
Start: 2025-04-21 | End: 2025-04-21

## 2025-04-21 RX ORDER — MAGNESIUM SULFATE 500 MG/ML
2 SYRINGE (ML) INJECTION ONCE
Refills: 0 | Status: COMPLETED | OUTPATIENT
Start: 2025-04-21 | End: 2025-04-21

## 2025-04-21 RX ORDER — DEXTROMETHORPHAN HBR, GUAIFENESIN 200 MG/10ML
600 LIQUID ORAL EVERY 12 HOURS
Refills: 0 | Status: DISCONTINUED | OUTPATIENT
Start: 2025-04-21 | End: 2025-04-25

## 2025-04-21 RX ORDER — MUPIROCIN CALCIUM 20 MG/G
1 CREAM TOPICAL EVERY 12 HOURS
Refills: 0 | Status: DISCONTINUED | OUTPATIENT
Start: 2025-04-21 | End: 2025-04-25

## 2025-04-21 RX ORDER — INSULIN GLARGINE-YFGN 100 [IU]/ML
15 INJECTION, SOLUTION SUBCUTANEOUS AT BEDTIME
Refills: 0 | Status: DISCONTINUED | OUTPATIENT
Start: 2025-04-21 | End: 2025-04-23

## 2025-04-21 RX ORDER — IPRATROPIUM BROMIDE AND ALBUTEROL SULFATE .5; 2.5 MG/3ML; MG/3ML
3 SOLUTION RESPIRATORY (INHALATION) ONCE
Refills: 0 | Status: COMPLETED | OUTPATIENT
Start: 2025-04-21 | End: 2025-04-21

## 2025-04-21 RX ADMIN — Medication 1 MILLIGRAM(S): at 22:26

## 2025-04-21 RX ADMIN — INSULIN LISPRO 3: 100 INJECTION, SOLUTION INTRAVENOUS; SUBCUTANEOUS at 08:00

## 2025-04-21 RX ADMIN — INSULIN GLARGINE-YFGN 9 UNIT(S): 100 INJECTION, SOLUTION SUBCUTANEOUS at 21:47

## 2025-04-21 RX ADMIN — Medication 25 GRAM(S): at 16:27

## 2025-04-21 RX ADMIN — MUPIROCIN CALCIUM 1 APPLICATION(S): 20 CREAM TOPICAL at 17:16

## 2025-04-21 RX ADMIN — BUMETANIDE 2 MILLIGRAM(S): 1 TABLET ORAL at 05:42

## 2025-04-21 RX ADMIN — HEPARIN SODIUM 1800 UNIT(S)/HR: 1000 INJECTION INTRAVENOUS; SUBCUTANEOUS at 05:44

## 2025-04-21 RX ADMIN — Medication 1 APPLICATION(S): at 05:43

## 2025-04-21 RX ADMIN — Medication 40 MILLIEQUIVALENT(S): at 13:56

## 2025-04-21 RX ADMIN — CLOPIDOGREL BISULFATE 75 MILLIGRAM(S): 75 TABLET, FILM COATED ORAL at 11:42

## 2025-04-21 RX ADMIN — SACUBITRIL AND VALSARTAN 1 TABLET(S): 6; 6 PELLET ORAL at 05:42

## 2025-04-21 RX ADMIN — INSULIN LISPRO 5 UNIT(S): 100 INJECTION, SOLUTION INTRAVENOUS; SUBCUTANEOUS at 22:03

## 2025-04-21 RX ADMIN — INSULIN LISPRO 3 UNIT(S): 100 INJECTION, SOLUTION INTRAVENOUS; SUBCUTANEOUS at 17:15

## 2025-04-21 RX ADMIN — INSULIN LISPRO 4: 100 INJECTION, SOLUTION INTRAVENOUS; SUBCUTANEOUS at 17:15

## 2025-04-21 RX ADMIN — IPRATROPIUM BROMIDE AND ALBUTEROL SULFATE 3 MILLILITER(S): .5; 2.5 SOLUTION RESPIRATORY (INHALATION) at 12:21

## 2025-04-21 RX ADMIN — METOPROLOL SUCCINATE 25 MILLIGRAM(S): 50 TABLET, EXTENDED RELEASE ORAL at 05:42

## 2025-04-21 RX ADMIN — INSULIN LISPRO 3: 100 INJECTION, SOLUTION INTRAVENOUS; SUBCUTANEOUS at 11:43

## 2025-04-21 RX ADMIN — BUMETANIDE 2 MILLIGRAM(S): 1 TABLET ORAL at 17:17

## 2025-04-21 RX ADMIN — INSULIN GLARGINE-YFGN 6 UNIT(S): 100 INJECTION, SOLUTION SUBCUTANEOUS at 22:40

## 2025-04-21 RX ADMIN — Medication 50 GRAM(S): at 13:56

## 2025-04-21 RX ADMIN — Medication 25 GRAM(S): at 14:28

## 2025-04-21 RX ADMIN — ENOXAPARIN SODIUM 80 MILLIGRAM(S): 100 INJECTION SUBCUTANEOUS at 17:16

## 2025-04-21 RX ADMIN — ATORVASTATIN CALCIUM 40 MILLIGRAM(S): 80 TABLET, FILM COATED ORAL at 21:08

## 2025-04-21 RX ADMIN — HEPARIN SODIUM 0 UNIT(S)/HR: 1000 INJECTION INTRAVENOUS; SUBCUTANEOUS at 13:16

## 2025-04-21 RX ADMIN — Medication 30 MILLILITER(S): at 22:26

## 2025-04-21 RX ADMIN — AMIODARONE HYDROCHLORIDE 200 MILLIGRAM(S): 50 INJECTION, SOLUTION INTRAVENOUS at 05:42

## 2025-04-21 RX ADMIN — SACUBITRIL AND VALSARTAN 1 TABLET(S): 6; 6 PELLET ORAL at 17:16

## 2025-04-21 RX ADMIN — Medication 40 MILLIEQUIVALENT(S): at 17:16

## 2025-04-21 RX ADMIN — DEXTROMETHORPHAN HBR, GUAIFENESIN 600 MILLIGRAM(S): 200 LIQUID ORAL at 14:42

## 2025-04-21 NOTE — CHART NOTE - NSCHARTNOTEFT_GEN_A_CORE
Transfer Note    Transfer from: SICU  Transfer to:   Accepting physican:      SICU COURSE:        ASSESSMENT AND PLAN:         For Follow-Up:          Vital Signs Last 24 Hrs  T(C): 37.1 (21 Apr 2025 07:34), Max: 37.1 (21 Apr 2025 07:34)  T(F): 98.7 (21 Apr 2025 07:34), Max: 98.7 (21 Apr 2025 07:34)  HR: 94 (21 Apr 2025 11:50) (83 - 99)  BP: 144/73 (21 Apr 2025 11:39) (117/74 - 166/85)  BP(mean): 97 (21 Apr 2025 11:39) (87 - 115)  RR: 18 (21 Apr 2025 11:39) (18 - 18)  SpO2: 95% (21 Apr 2025 11:50) (94% - 99%)    Parameters below as of 21 Apr 2025 11:50  Patient On (Oxygen Delivery Method): nasal cannula  O2 Flow (L/min): 2    I&O's Summary    20 Apr 2025 07:01  -  21 Apr 2025 07:00  --------------------------------------------------------  IN: 1135 mL / OUT: 1500 mL / NET: -365 mL    21 Apr 2025 07:01  -  21 Apr 2025 14:06  --------------------------------------------------------  IN: 117 mL / OUT: 1300 mL / NET: -1183 mL          MEDICATIONS  (STANDING):  aMIOdarone    Tablet 200 milliGRAM(s) Oral daily  atorvastatin 40 milliGRAM(s) Oral at bedtime  buMETAnide Injectable 2 milliGRAM(s) IV Push every 12 hours  chlorhexidine 2% Cloths 1 Application(s) Topical <User Schedule>  clopidogrel Tablet 75 milliGRAM(s) Oral daily  enoxaparin Injectable 80 milliGRAM(s) SubCutaneous every 12 hours  insulin glargine Injectable (LANTUS) 9 Unit(s) SubCutaneous at bedtime  insulin lispro (ADMELOG) corrective regimen sliding scale   SubCutaneous three times a day before meals  insulin lispro Injectable (ADMELOG) 3 Unit(s) SubCutaneous three times a day before meals  magnesium sulfate  IVPB 2 Gram(s) IV Intermittent every 2 hours  metoprolol succinate ER 25 milliGRAM(s) Oral daily  potassium chloride    Tablet ER 40 milliEquivalent(s) Oral every 4 hours  sacubitril 24 mG/valsartan 26 mG 1 Tablet(s) Oral two times a day  spironolactone 25 milliGRAM(s) Oral every 24 hours    MEDICATIONS  (PRN):  acetaminophen     Tablet .. 650 milliGRAM(s) Oral every 6 hours PRN Temp greater or equal to 38C (100.4F), Mild Pain (1 - 3)  aluminum hydroxide/magnesium hydroxide/simethicone Suspension 30 milliLiter(s) Oral every 4 hours PRN Dyspepsia  ondansetron Injectable 4 milliGRAM(s) IV Push every 8 hours PRN Nausea and/or Vomiting  zolpidem 5 milliGRAM(s) Oral at bedtime PRN Insomnia        LABS                                            12.3                  Neurophils% (auto):   x      (04-21 @ 11:36):    15.38)-----------(435          Lymphocytes% (auto):  x                                             37.6                   Eosinphils% (auto):   x        Manual%: Neutrophils x    ; Lymphocytes x    ; Eosinophils x    ; Bands%: x    ; Blasts x                                    135    |  96     |  22                  Calcium: 8.8   / iCa: x      (04-21 @ 11:36)    ----------------------------<  297       Magnesium: 1.6                              3.4     |  27     |  1.2              Phosphorous: x          ( 04-21 @ 11:36 )   PT: x    ;   INR: x      aPTT: 141.6 sec Transfer Note    Transfer from:  Stepdown  Transfer to: [facility]-tele  Accepting physician: Dr. Kenrick Best [facility] COURSE: 71 y/o male with past medical history of HFrEF 43%, ischemic cardiomyopathy s/p CRT-D, AVR, hypertension, paroxysmal A-fib (status post ablation 1/14/25), CAD s/p PCI (2019), diabetes, hyperlipidemia, COPD (on 2L home O2), frequent  falls, presented to the ED with complaint of acute onset SOB and 10/10 chest pain since afternoon. As per EMS, the pt was having runs of VT. Upon ED presentation, the pt was noted to be in sinus tachycardia rhythm and with associated hypoxia. The pt was started on BiPAP and placed on a nitroglycerin drip. He reported improvement in symptoms which resolution of chest pain. Denied any fever, chills, headache, changes in vision, cough, congestion, n/v/d, abd pain, constipation, urinary complaints, lower extremity pain/swelling.      The patient was admitted to  stepdown where he was weaned off nitroglycerine drip by 4/20/25 morning. He remained on 4L O2 via NC, still feeling shortness of breath. The patient was intermittent angry, and wanted to leave against medical advice. While he was oriented, he did not understand the gravity of his medical condition. He stated that he will take a cab and go home despite realizing that he is short of breath and on oxygen. He was seen by psychiatry who agreed that he presently lacks capacity to make his decisions to leave AMA, and to defer to next of kin.      The patient had few runs of NSVT while on . His mag and potassium were found to be low (he is on IV bumetanide). His BP remained stable and he remained without chest pain. Electrolyte repletion was ordered. Patient is stable to be downgraded to medicine-telemetry.        ASSESSMENT AND PLAN:       #Acute on chronic HFrEF exacerbation  #Hypertensive emergency  # NSTEMI  - BNP 3.6k, trop peak 166 >>162  - Lactate 6.4 >> 1.8  - CXR 4/18: Bilateral opacities, congested.  - C/w IV Bumex 2mg BID, patient is still short of breath and requiring oxygen.  - 4/20 pt pulled out IV, no longer on nitro drip  - hold coreg  - Echo 4/20 noted. EF 30%. This is a drop from Jan 2025 when the LVEF was 43%.  - Daily wt, strict I/O  - CXR 4/20: Unchanged bilateral pleural effusions, opacities.  - Off nitro drip since 4/20 AM, BP better controlled now.  - Started spironolactone 25 QD on 4/21.    #Agitation  - Patient intermittently agitated, pulling out IV line and screaming at staff.  - Patient insisting on leaving AMA, understands that it may result in death given his current medical state, he stated to let him die and that he "wants to get as close to his parents as possible". See progress note from 4/21.  - The above occurred while Valencia, spouse, was on the phone at patient's bedside. She does not want the patient to leave AMA.  - Psychiatry consulted, patient does not have capacity to leave against medical advice.    #SIRS present on admission - Less likely infection  - was started cefepime empirically.  - ID recs appreciated: Clinical picture not consistent with sepsis. DC abx.  - Follow up with blood cultures, prelim negative.  - SARS Flu RSV negative.  - No UA done.    #Paroxysmal afib  #PPM  - Eliquis held in case ischemic workup is to be done inpatient for acute drop in ejection fracture.  - Heparin drip switched to enoxaparin given improvement in kidney function.  - Metoprolol succinate ER 25mg, amiodarone 200 QD.    #CAD  - C/w clopidogrel, atorvastatin.    -------------------------------------------------------------------------------------------------------------------------------------  #DVT PPX: Enoxaparin  #GI PPX: Pantoprazole  #Diet: Dash   #Code Status: full  #Activity Order: IAT    #Handoff: Pending improvement in respiratory status.      For Follow-Up:    [  ] official psych note.  [  ] Improvement in respiratory status.  [  ] Transition to PO diuresis pending improvement in respiration.      Vital Signs Last 24 Hrs  T(C): 37.1 (21 Apr 2025 07:34), Max: 37.1 (21 Apr 2025 07:34)  T(F): 98.7 (21 Apr 2025 07:34), Max: 98.7 (21 Apr 2025 07:34)  HR: 94 (21 Apr 2025 11:50) (83 - 99)  BP: 144/73 (21 Apr 2025 11:39) (117/74 - 166/85)  BP(mean): 97 (21 Apr 2025 11:39) (87 - 115)  RR: 18 (21 Apr 2025 11:39) (18 - 18)  SpO2: 95% (21 Apr 2025 11:50) (94% - 99%)    Parameters below as of 21 Apr 2025 11:50  Patient On (Oxygen Delivery Method): nasal cannula  O2 Flow (L/min): 2    I&O's Summary    20 Apr 2025 07:01  -  21 Apr 2025 07:00  --------------------------------------------------------  IN: 1135 mL / OUT: 1500 mL / NET: -365 mL    21 Apr 2025 07:01  -  21 Apr 2025 14:06  --------------------------------------------------------  IN: 117 mL / OUT: 1300 mL / NET: -1183 mL          MEDICATIONS  (STANDING):  aMIOdarone    Tablet 200 milliGRAM(s) Oral daily  atorvastatin 40 milliGRAM(s) Oral at bedtime  buMETAnide Injectable 2 milliGRAM(s) IV Push every 12 hours  chlorhexidine 2% Cloths 1 Application(s) Topical <User Schedule>  clopidogrel Tablet 75 milliGRAM(s) Oral daily  enoxaparin Injectable 80 milliGRAM(s) SubCutaneous every 12 hours  insulin glargine Injectable (LANTUS) 9 Unit(s) SubCutaneous at bedtime  insulin lispro (ADMELOG) corrective regimen sliding scale   SubCutaneous three times a day before meals  insulin lispro Injectable (ADMELOG) 3 Unit(s) SubCutaneous three times a day before meals  magnesium sulfate  IVPB 2 Gram(s) IV Intermittent every 2 hours  metoprolol succinate ER 25 milliGRAM(s) Oral daily  potassium chloride    Tablet ER 40 milliEquivalent(s) Oral every 4 hours  sacubitril 24 mG/valsartan 26 mG 1 Tablet(s) Oral two times a day  spironolactone 25 milliGRAM(s) Oral every 24 hours    MEDICATIONS  (PRN):  acetaminophen     Tablet .. 650 milliGRAM(s) Oral every 6 hours PRN Temp greater or equal to 38C (100.4F), Mild Pain (1 - 3)  aluminum hydroxide/magnesium hydroxide/simethicone Suspension 30 milliLiter(s) Oral every 4 hours PRN Dyspepsia  ondansetron Injectable 4 milliGRAM(s) IV Push every 8 hours PRN Nausea and/or Vomiting  zolpidem 5 milliGRAM(s) Oral at bedtime PRN Insomnia        LABS                                            12.3                  Neurophils% (auto):   x      (04-21 @ 11:36):    15.38)-----------(435          Lymphocytes% (auto):  x                                             37.6                   Eosinphils% (auto):   x        Manual%: Neutrophils x    ; Lymphocytes x    ; Eosinophils x    ; Bands%: x    ; Blasts x                                    135    |  96     |  22                  Calcium: 8.8   / iCa: x      (04-21 @ 11:36)    ----------------------------<  297       Magnesium: 1.6                              3.4     |  27     |  1.2              Phosphorous: x          ( 04-21 @ 11:36 )   PT: x    ;   INR: x      aPTT: 141.6 sec

## 2025-04-21 NOTE — BH CONSULTATION LIAISON ASSESSMENT NOTE - NSBHCONSULTMEDAGITATION_PSY_A_CORE FT
For agitation not amenable to verbal redirection, Abilify 5mg PO q6h. If patient not amenable to oral medications, then Ativan 2mg IM/IV q6h.

## 2025-04-21 NOTE — GOALS OF CARE CONVERSATION - ADVANCED CARE PLANNING - CONVERSATION DETAILS
Spoke with patient's spouse Valencia in the morning for updates. After our conversation, she referred me to their son Neto and asked me to speak with him. Spoke with son Neto today afternoon and provided him with detailed updates. We discussed the patient's lack of decision making capacity regarding leaving against medical advice. He agreed with the cyclical nature of the patient's behavior of coming to the hospital in distress, and signing out AMA when he feels even a little better. I explained to Neto (son) that the patient is at risk for arrhythmias and death from this and his respiratory function given this pattern. We discussed code status. Neto (son) stated that the patient has not had this discussion with the family. Neto (son) endorses significant caregiver burden given the patient's behavior; he identifies himself as the main caregiver as he states that his mother (patient's spouse) is disabled and "does not understand very much". He is open to palliative care consult for further GOC discussions and code status. He is appreciative of the honest conversation.

## 2025-04-21 NOTE — BH CONSULTATION LIAISON ASSESSMENT NOTE - NSBHINDICATION_PSY_ALL_CORE
Patient does not require psychiatric 1:1; however would benefit from nursing 1:1 due to risk of elopement

## 2025-04-21 NOTE — PROGRESS NOTE ADULT - ASSESSMENT
71 yo male with h/o heart failure, afib, PPM presented with acute onset of dyspnea. Found to have pulmonary edema, likely 2/2 acute heart failure exacerbation. s/p IV lasix, biPAP.  #Acute on chronic HFrEF exacerbation  #Hypertensive emergancy  # NSTEMI  - BNP 3.6k, trop peak 166 >>162  - Lactate 6.4 >> 1.8  - CXR 4/18: Bilateral opacities, congested.  - C/w IV Bumex 2mg BID, patient is still short of breath and requiring oxygen.  - 4/20 pt pulled out IV, no longer on nitro drip  - hold coreg  - Echo 4/20 noted. EF 30%. This is a drop from Jan 2025 when the LVEF was 43%.  - Daily wt, strict I/O  - CXR 4/20: Unchanged bilateral pleural effusions, opacities.  - Off nitro drip since 4/20 AM, BP better controlled now.  - Await repeat BMP from 4/21 11 am, if K okay, start spironolactone 25 QD.    #Agitation  - Patient consistently agitated, pulling out IV line and screaming at staff.  - Patient is insisting on leaving AMA, understands that it may result in death given his current medical state, he stated to let him die and that he "wants to get as close to his parents as possible". See progress note from 4/21.  - The above occurred while Valencia, spouse, was on the phone at patient's bedside. She does not want the patient to leave AMA.    #SIRS present on admission - Less likely infection  - was started cefepime empirically. D/C abx.  - ID recs appreciated: Clinical picture not consistent with sepsis.  - Follow up with blood cultures, prelim negative.  - SARS Flu RSV negative.  - No UA done.    #Paroxysmal afib  #PPM  - Eliquis held.   - Currently on Heparin infusion.  - Metoprolol succinate ER 25mg, amiodarone 200 QD.    #CAD  - C/w clopidogrel atorvastatin.    -------------------------------------------------------------------------------------------------------------------------------------  #DVT PPX: Heparin  #GI PPX: Pantoprazole  #Diet: Dash   #Code Status: full  #Activity Order: IAT    #Handoff: Pending improvement in respiratory status.

## 2025-04-21 NOTE — BH CONSULTATION LIAISON ASSESSMENT NOTE - NSBHREFERDETAILS_PSY_A_CORE_FT
Patient is here with HF exacerbation, requiring oxygen, insisting on leaving AMA despite not being able to speak in full sentences due to SOB. Wife stating not to let patient leave AMA. Patient endorses wanting to die. Kindly assist with assessing whether the patient requires to remain inpatient from psych perspective, and any recs for agitation. Thanks.

## 2025-04-21 NOTE — CHART NOTE - NSCHARTNOTEFT_GEN_A_CORE
Pt with hypertensive emergency complicated by acute pulmonary edema on bumex 2mg IV bid. Still on 4 liter oxygen.  bp improving   pt insisting on leaving AMA for the last 2 days . Seems alert and oriented. Psych consult to evaluate for capacity .  Afib now on heparin. consider switching to lovenox (?plan for ischemic work up)     Can transfer to medicine once we know if pt has capacity or not by psych

## 2025-04-21 NOTE — BH CONSULTATION LIAISON ASSESSMENT NOTE - SUMMARY
Patient is a 70 y.o.  male domiciled at private residence with past medical hx of afib on eliquis with aicd, cad, chf, dm, hld, htn, no reported formal past psych hx except some psychotherapy; admitted to medical floor for treatment of dyspnea/CHF exacerbation. Psychiatry consulted for assessment of capacity to leave AMA. When the psychiatry was called, patient had an episode of V tach.     Upon evaluation, patient is able to communicate a choice (wanting to go home), and appear to understand some relevant information (that he needs medical treatments) but does not fully appreciate the situation and its consequences, and unable to reason about treatment options. He appears to be hopeless and distraught about his acute medical problems and deaths in the family, leaving him to feel helpless. He appears to be frustrated about his hospital stay but does not have robust frustration tolerance or healthy coping skills. At this time, based on the Sidney criteria, patient lacks the capacity make decision to leave AMA.  Patient is a 70 y.o.  male domiciled at private residence with past medical hx of afib on eliquis with aicd, cad, chf, dm, hld, htn, no reported formal past psych hx except some psychotherapy; admitted to medical floor for treatment of dyspnea/CHF exacerbation. Psychiatry consulted for assessment of capacity to leave AMA. When the psychiatry was called, patient had an episode of V tach.     Upon evaluation, patient is able to communicate a choice (wanting to go home), and appear to understand some relevant information (that he needs medical treatments) but does not fully appreciate the situation and its consequences, and unable to reason about treatment options. He appears to be hopeless and distraught about his acute medical problems and deaths in the family, leaving him to feel helpless. He appears to be frustrated about his hospital stay but does not have robust frustration tolerance or healthy coping skills. At this time, based on the Sidney criteria, patient lacks the capacity make decision to leave AMA.

## 2025-04-21 NOTE — BH CONSULTATION LIAISON ASSESSMENT NOTE - NSBHCHARTREVIEWLAB_PSY_A_CORE FT
12.3   15.38 )-----------( 435      ( 21 Apr 2025 11:36 )             37.6       04-21    135  |  96[L]  |  22[H]  ----------------------------<  297[H]  3.4[L]   |  27  |  1.2    Ca    8.8      21 Apr 2025 11:36  Mg     1.6     04-21    TPro  6.6  /  Alb  3.1[L]  /  TBili  0.5  /  DBili  x   /  AST  50[H]  /  ALT  13  /  AlkPhos  95  04-20              PTT - ( 21 Apr 2025 11:36 )  PTT:141.6 sec
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
## 2025-04-21 NOTE — BH CONSULTATION LIAISON ASSESSMENT NOTE - NSICDXPASTSURGICALHX_GEN_ALL_CORE_FT
EDPD Note: Lab/Chart Reviewed    Reviewed Mr./Mrs./Ms. Katlyn Hernandez 's chart regarding a positive contaminated urine culture/result that was taken during their recent emergency room visit. [facility] .Therefore, I have faxed this information to Earlene Kebede at fax number [phone] .        No further follow up needed from EDPD Team.     Kimberly Miller, ReubenD       
no
PAST SURGICAL HISTORY:  H/O heart artery stent     Heart valve replaced aortic    History of Nissen fundoplication

## 2025-04-21 NOTE — BH CONSULTATION LIAISON ASSESSMENT NOTE - OTHER
per chart review from care coordination 3/31 2025, patient has assistance at home from spouse and son

## 2025-04-21 NOTE — BH CONSULTATION LIAISON ASSESSMENT NOTE - NSBHATTESTCOMMENTATTENDFT_PSY_A_CORE
I saw and evaluated the patient today 04-21-25 with resident during key/critical portions of the visit.  Nursing/primary team/consultant records reviewed. I agree with the resident's note including past psych history, home medications, social history, allergies, surgical history, family history, and review of system. I have reviewed relevant vitals, laboratory values, imaging studies, and microbiology.  I agree with the trainee's findings and assessment and plan as documented in the trainee's note.     - Above resident's note was edited by me     - agree with rest of A/P as per detailed resident note, unless noted below.

## 2025-04-21 NOTE — CHART NOTE - NSCHARTNOTEFT_GEN_A_CORE
Alerted by nurse that pt c/o 7/10 chest pain, diaphoretic, increased NC to 4L NC satting 91%. Temp 98.3, /67. HR 81. Evaluated pt at bedside, who said he was not having pain, not sweating, did not have any acute complaints. Said the pain he felt came and went in a second. When I asked pt to describe pain he could not give a clear answer, said Mucinex may help. STAT EKG stable from previous. Ordered repeat trop for 8PM, continue cardiac monitoring. Did not order sublingual nitro as pain resolved. Will continue to monitor.

## 2025-04-21 NOTE — PROGRESS NOTE ADULT - SUBJECTIVE AND OBJECTIVE BOX
CHIEF COMPLAINT:  Patient is a 70y old  Male who presents with a chief complaint of Tachycardia and Pulmonary Edema (19 Apr 2025 08:46)    INTERVAL HISTORY/OVERNIGHT EVENTS:    The patient is agitated today morning. He is insisting on leaving the hospital. He reports still feeling short of breath and is not able to speak in full sentences, and is still requiring supplemental O2. He understands that leaving the hospital in this state will likely result in negative outcomes including death.    Resident called the patient's wife Valencia and explained the situation to her. She understands that the patient requires inpatient stay. She attempted to convince the patient to stay over the phone, but the patient states that he is okay with dying and "want to get as close to his parents as possible". Valencia requested that we do not let him leave against medical advice and requested that resident call son Neto, verified phone number, but received no response.    ======================  MEDICATIONS:  aMIOdarone    Tablet 200 milliGRAM(s) Oral daily  atorvastatin 40 milliGRAM(s) Oral at bedtime  buMETAnide Injectable 2 milliGRAM(s) IV Push every 12 hours  chlorhexidine 2% Cloths 1 Application(s) Topical <User Schedule>  clopidogrel Tablet 75 milliGRAM(s) Oral daily  insulin lispro (ADMELOG) corrective regimen sliding scale   SubCutaneous three times a day before meals  metoprolol succinate ER 25 milliGRAM(s) Oral daily  sacubitril 24 mG/valsartan 26 mG 1 Tablet(s) Oral two times a day    DRIPS:  heparin  Infusion.  Unit(s)/Hr (14 mL/Hr) IV Continuous <Continuous>    PRN:       ======================  PHYSICAL EXAMINATION:  GEN:  NAD, but gets agitated upon discussing medical care  HEENT:  eomi. ncat  PULM:  b/l lung sounds w/ bibasilar crackles  CARD: s1, s2 noted  VASC: No LE edema b/l  ABD: +bs. ntnd  EXT:  no new rashes.    NEURO:  no new focal deficits.   ======================  OBJECTIVE:        VS:  T(F): 98.7 (04-21 @ 07:34), Max: 98.7 (04-21 @ 07:34)  HR: 94 (04-21 @ 10:16) (83 - 99)  BP: 166/85 (04-21 @ 07:34) (117/74 - 166/85)  RR: 18 (04-21 @ 07:34) (18 - 18)  SpO2: 94% (04-21 @ 10:16) (88% - 99%)  CVP(mm Hg): --  CO: --  CI: --  PA: --  PCWP: --    I/O:      04-19 @ 07:01  -  04-20 @ 07:00  --------------------------------------------------------  IN: 692 mL / OUT: 3225 mL / NET: -2533 mL    04-20 @ 07:01  -  04-21 @ 07:00  --------------------------------------------------------  IN: 1135 mL / OUT: 1500 mL / NET: -365 mL        Weight trend:  Weight (kg): 79.1 (04-19)    ======================    LABS:                          12.1   16.57 )-----------( 448      ( 21 Apr 2025 06:06 )             37.2     04-20    133[L]  |  97[L]  |  27[H]  ----------------------------<  332[H]  5.7[H]   |  23  |  1.4    Ca    8.7      20 Apr 2025 12:02  Mg     2.7     04-19    TPro  6.6  /  Alb  3.1[L]  /  TBili  0.5  /  DBili  x   /  AST  50[H]  /  ALT  13  /  AlkPhos  95  04-20    LIVER FUNCTIONS - ( 20 Apr 2025 12:02 )  Alb: 3.1 g/dL / Pro: 6.6 g/dL / ALK PHOS: 95 U/L / ALT: 13 U/L / AST: 50 U/L / GGT: x           PTT - ( 21 Apr 2025 04:25 )  PTT:50.8 sec    Cultures:    Culture - Blood (collected 04-18)  Source: Blood Blood  Preliminary Report:    No growth at 24 hours    Culture - Blood (collected 04-18)  Source: Blood Blood  Preliminary Report:    No growth at 24 hours

## 2025-04-21 NOTE — BH CONSULTATION LIAISON ASSESSMENT NOTE - NSUNABLEASSESSPROTRISKCOMMENT_PSY_ALL_CORE
patient reports that he doesn't want to kill himself; other protective factors include being domiciled and lack of lethal means

## 2025-04-21 NOTE — BH CONSULTATION LIAISON ASSESSMENT NOTE - HPI (INCLUDE ILLNESS QUALITY, SEVERITY, DURATION, TIMING, CONTEXT, MODIFYING FACTORS, ASSOCIATED SIGNS AND SYMPTOMS)
Patient is a 70 y.o.  male domiciled at private residence with past medical hx of afib on eliquis with aicd, cad, chf, dm, hld, htn, no reported formal past psych hx except some psychotherapy; admitted to medical floor for treatment of dyspnea/CHF exacerbation. Psychiatry consulted for assessment of capacity to leave AMA.     On approach, patient was awake and alert, lying on the bed. The interview was conducted with the medicine team and psychiatry team. Patient states that he "[doesn't] give a crap," about him getting better because everyone around him  and there is no point of living. He becomes tearful. When the medical resident explained that he might die if the treatment discontinues, patient continues to state that he doesn't care. When asked about his family--his living wife and son--the patient states they don't care. Patient also states "If [medical team] wanted to help me feel better, I should have been better already." When the team acknowledged that the situation is very frustrating and also he sounds depressed, he states, "Yes, I am depressed." When asked about suicidality, he notes that he would not want to die if he were healthy. He does not want to explore comfort measures. He declines psychotropic medications, stating, "I am already on hundreds of medications."

## 2025-04-21 NOTE — BH CONSULTATION LIAISON ASSESSMENT NOTE - NSBHCONSULTRECOMMENDOTHER_PSY_A_CORE FT
#Capacity to leave AMA  - Patient unable to appreciate the severity of his medical condition and consequences; according to tuan criteria, patient lacks the capacity to leave AMA.   - Would speak with the surrogate decision maker   - Capacity evaluation is done at the specific time and decision. Please feel free to contact us if you have any questions regarding his capacity in making decisions other than leaving AMA.     #Agitation   - First line: Can try Abilify 5mg PO q6h PRN for agitation   - Second line: If patient refuses to accept oral medication and continues to be agitated, posing harm to self or others, then can consider Ativan 2mg IM/IV q6h PRN for agitation.

## 2025-04-21 NOTE — BH CONSULTATION LIAISON ASSESSMENT NOTE - RISK ASSESSMENT
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

## 2025-04-21 NOTE — BH CONSULTATION LIAISON ASSESSMENT NOTE - NSBHCHARTREVIEWVS_PSY_A_CORE FT
Vital Signs Last 24 Hrs  T(C): 37.1 (21 Apr 2025 07:34), Max: 37.1 (21 Apr 2025 07:34)  T(F): 98.7 (21 Apr 2025 07:34), Max: 98.7 (21 Apr 2025 07:34)  HR: 94 (21 Apr 2025 11:50) (83 - 99)  BP: 144/73 (21 Apr 2025 11:39) (117/74 - 166/85)  BP(mean): 97 (21 Apr 2025 11:39) (87 - 115)  RR: 18 (21 Apr 2025 11:39) (18 - 18)  SpO2: 95% (21 Apr 2025 11:50) (94% - 99%)    Parameters below as of 21 Apr 2025 11:50  Patient On (Oxygen Delivery Method): nasal cannula  O2 Flow (L/min): 2

## 2025-04-22 DIAGNOSIS — R45.1 RESTLESSNESS AND AGITATION: ICD-10-CM

## 2025-04-22 DIAGNOSIS — Z71.89 OTHER SPECIFIED COUNSELING: ICD-10-CM

## 2025-04-22 DIAGNOSIS — Z51.5 ENCOUNTER FOR PALLIATIVE CARE: ICD-10-CM

## 2025-04-22 LAB
ALBUMIN SERPL ELPH-MCNC: 3.5 G/DL — SIGNIFICANT CHANGE UP (ref 3.5–5.2)
ALP SERPL-CCNC: 95 U/L — SIGNIFICANT CHANGE UP (ref 30–115)
ALT FLD-CCNC: 9 U/L — SIGNIFICANT CHANGE UP (ref 0–41)
ANION GAP SERPL CALC-SCNC: 13 MMOL/L — SIGNIFICANT CHANGE UP (ref 7–14)
AST SERPL-CCNC: 10 U/L — SIGNIFICANT CHANGE UP (ref 0–41)
BASOPHILS # BLD AUTO: 0.07 K/UL — SIGNIFICANT CHANGE UP (ref 0–0.2)
BASOPHILS NFR BLD AUTO: 0.4 % — SIGNIFICANT CHANGE UP (ref 0–1)
BILIRUB SERPL-MCNC: 0.5 MG/DL — SIGNIFICANT CHANGE UP (ref 0.2–1.2)
BUN SERPL-MCNC: 20 MG/DL — SIGNIFICANT CHANGE UP (ref 10–20)
CALCIUM SERPL-MCNC: 9.2 MG/DL — SIGNIFICANT CHANGE UP (ref 8.4–10.5)
CHLORIDE SERPL-SCNC: 101 MMOL/L — SIGNIFICANT CHANGE UP (ref 98–110)
CO2 SERPL-SCNC: 26 MMOL/L — SIGNIFICANT CHANGE UP (ref 17–32)
CREAT SERPL-MCNC: 1.2 MG/DL — SIGNIFICANT CHANGE UP (ref 0.7–1.5)
EGFR: 65 ML/MIN/1.73M2 — SIGNIFICANT CHANGE UP
EGFR: 65 ML/MIN/1.73M2 — SIGNIFICANT CHANGE UP
EOSINOPHIL # BLD AUTO: 0.09 K/UL — SIGNIFICANT CHANGE UP (ref 0–0.7)
EOSINOPHIL NFR BLD AUTO: 0.5 % — SIGNIFICANT CHANGE UP (ref 0–8)
GLUCOSE BLDC GLUCOMTR-MCNC: 139 MG/DL — HIGH (ref 70–99)
GLUCOSE BLDC GLUCOMTR-MCNC: 156 MG/DL — HIGH (ref 70–99)
GLUCOSE BLDC GLUCOMTR-MCNC: 258 MG/DL — HIGH (ref 70–99)
GLUCOSE BLDC GLUCOMTR-MCNC: 264 MG/DL — HIGH (ref 70–99)
GLUCOSE SERPL-MCNC: 262 MG/DL — HIGH (ref 70–99)
HCT VFR BLD CALC: 36.5 % — LOW (ref 42–52)
HGB BLD-MCNC: 11.9 G/DL — LOW (ref 14–18)
IMM GRANULOCYTES NFR BLD AUTO: 0.9 % — HIGH (ref 0.1–0.3)
LYMPHOCYTES # BLD AUTO: 1.14 K/UL — LOW (ref 1.2–3.4)
LYMPHOCYTES # BLD AUTO: 6.9 % — LOW (ref 20.5–51.1)
MAGNESIUM SERPL-MCNC: 2.3 MG/DL — SIGNIFICANT CHANGE UP (ref 1.8–2.4)
MCHC RBC-ENTMCNC: 27.4 PG — SIGNIFICANT CHANGE UP (ref 27–31)
MCHC RBC-ENTMCNC: 32.6 G/DL — SIGNIFICANT CHANGE UP (ref 32–37)
MCV RBC AUTO: 83.9 FL — SIGNIFICANT CHANGE UP (ref 80–94)
MONOCYTES # BLD AUTO: 1.01 K/UL — HIGH (ref 0.1–0.6)
MONOCYTES NFR BLD AUTO: 6.2 % — SIGNIFICANT CHANGE UP (ref 1.7–9.3)
NEUTROPHILS # BLD AUTO: 13.96 K/UL — HIGH (ref 1.4–6.5)
NEUTROPHILS NFR BLD AUTO: 85.1 % — HIGH (ref 42.2–75.2)
NRBC BLD AUTO-RTO: 0 /100 WBCS — SIGNIFICANT CHANGE UP (ref 0–0)
PLATELET # BLD AUTO: 404 K/UL — HIGH (ref 130–400)
PMV BLD: 10.8 FL — HIGH (ref 7.4–10.4)
POTASSIUM SERPL-MCNC: 4.1 MMOL/L — SIGNIFICANT CHANGE UP (ref 3.5–5)
POTASSIUM SERPL-SCNC: 4.1 MMOL/L — SIGNIFICANT CHANGE UP (ref 3.5–5)
PROT SERPL-MCNC: 6.2 G/DL — SIGNIFICANT CHANGE UP (ref 6–8)
RBC # BLD: 4.35 M/UL — LOW (ref 4.7–6.1)
RBC # FLD: 13.8 % — SIGNIFICANT CHANGE UP (ref 11.5–14.5)
SODIUM SERPL-SCNC: 140 MMOL/L — SIGNIFICANT CHANGE UP (ref 135–146)
WBC # BLD: 16.41 K/UL — HIGH (ref 4.8–10.8)
WBC # FLD AUTO: 16.41 K/UL — HIGH (ref 4.8–10.8)

## 2025-04-22 PROCEDURE — 99233 SBSQ HOSP IP/OBS HIGH 50: CPT

## 2025-04-22 PROCEDURE — 71045 X-RAY EXAM CHEST 1 VIEW: CPT | Mod: 26

## 2025-04-22 PROCEDURE — 93010 ELECTROCARDIOGRAM REPORT: CPT

## 2025-04-22 PROCEDURE — 99223 1ST HOSP IP/OBS HIGH 75: CPT

## 2025-04-22 PROCEDURE — 99497 ADVNCD CARE PLAN 30 MIN: CPT | Mod: 25

## 2025-04-22 RX ORDER — BUMETANIDE 1 MG/1
2 TABLET ORAL
Refills: 0 | Status: DISCONTINUED | OUTPATIENT
Start: 2025-04-22 | End: 2025-04-25

## 2025-04-22 RX ORDER — TRAZODONE HCL 100 MG
75 TABLET ORAL ONCE
Refills: 0 | Status: COMPLETED | OUTPATIENT
Start: 2025-04-22 | End: 2025-04-22

## 2025-04-22 RX ORDER — MELATONIN 5 MG
5 TABLET ORAL AT BEDTIME
Refills: 0 | Status: DISCONTINUED | OUTPATIENT
Start: 2025-04-22 | End: 2025-04-25

## 2025-04-22 RX ADMIN — SACUBITRIL AND VALSARTAN 1 TABLET(S): 6; 6 PELLET ORAL at 17:20

## 2025-04-22 RX ADMIN — Medication 5 MILLIGRAM(S): at 00:36

## 2025-04-22 RX ADMIN — INSULIN GLARGINE-YFGN 15 UNIT(S): 100 INJECTION, SOLUTION SUBCUTANEOUS at 21:40

## 2025-04-22 RX ADMIN — INSULIN LISPRO 5 UNIT(S): 100 INJECTION, SOLUTION INTRAVENOUS; SUBCUTANEOUS at 08:07

## 2025-04-22 RX ADMIN — ENOXAPARIN SODIUM 80 MILLIGRAM(S): 100 INJECTION SUBCUTANEOUS at 17:21

## 2025-04-22 RX ADMIN — ATORVASTATIN CALCIUM 40 MILLIGRAM(S): 80 TABLET, FILM COATED ORAL at 21:39

## 2025-04-22 RX ADMIN — BUMETANIDE 2 MILLIGRAM(S): 1 TABLET ORAL at 21:40

## 2025-04-22 RX ADMIN — INSULIN LISPRO 3: 100 INJECTION, SOLUTION INTRAVENOUS; SUBCUTANEOUS at 08:06

## 2025-04-22 RX ADMIN — Medication 75 MILLIGRAM(S): at 01:46

## 2025-04-22 RX ADMIN — BUMETANIDE 2 MILLIGRAM(S): 1 TABLET ORAL at 09:05

## 2025-04-22 NOTE — CONSULT NOTE ADULT - ASSESSMENT
ASSESSMENT  This is a 70 year old male with PMH of afib on eliquis with aicd, cad, chf, dm, hld, htn presented to the ED with complaint of acute onset SOB and 10/10 chest pain.     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    RECOMMENDATIONS  -Follow up with blood cultures.  -Clinical picture not consistent with sepsis.  -Cardiac workup in progress. Needs further optimization for volume status.  -D/C abx.   -Off loading to prevent pressure sores and preventive measures to avoid aspiration.    If any questions, please send a message or call on [employer] Teams  Please continue to update ID with any pertinent new laboratory or radiographic findings.    Benigno Pond M.D  Infectious Diseases Attending/   [employer] at [facility]  
1. CHF exacerbation
70 year old male pmhx afib on eliquis with aicd, cad, chf, dm, hld, htn presented to the ED with complaint of acute onset SOB and 10/10 chest pain.   Patient found to have acute heart failure, HTN emergency causing flash pulmonary edema and hospital course complicated by agitation.  Palliative care consulted for GOC.    Met with patient at bedside.  Patient refused to speak with me. Called and spoke with son Neto. Palliative care introduced. He was able to provide a medical history and hospital course. He noted that the patient has been hospitalized frequently and often leaves AMA.  He notes after the patient comes home, he often forgets to take his medications, and then calls to come back to the hospital.   We discussed code status and GOC.  He noted that he never spoke to the patient about code status, but believes the patient would want CPR/intubation based on his wish to continue to go to the hospital, and the fact that he was intubated and extubated earlier this year.  We discussed that given his extensive cardiac and pulmonary history, he would likely have very poor overall prognosis and quality of life should he go into cardiac arrest.  He wants the patient to remain full code, but will consider options moving forward.    Plan  -Full code  -ongoing medical management  -continue aggressive diuresis with IV bumex   -trend lytes, creatinine, UOP  -patient does not currently have capacity - follow up behavioral health  -patient's son Neto is decision maker  -goals are clear - will sign off for now  -call back [phone] PRN    Education about palliative care provided to patient/family.  See Recs below.    Please call [phone] with questions or concerns 24/7.

## 2025-04-22 NOTE — CONSULT NOTE ADULT - NS ATTEST RISK PROBLEM GEN_ALL_CORE FT
1.   [ ] 1 or more chronic illnesses with severe exacerbation, progression, or side effects of treatment  [ x] 1 acute or chronic illnesses or injuries that may pose a threat to life or bodily function    2.  [x ] Personally review and interpretation of  image or testing   [x ] Discussion of management or test interpretation with external physician/other qualified health care professional\appropriate source (not separately reported)    3. [x ] Drug therapy requiring intensive monitoring for toxicity   [ ] Decision regarding elective major surgery, treatment, or procedure with identified patient or procedure risk factors   [ ] Decision regarding emergency major surgery, treatment, or procedure   [ ] Decision regarding hospitalization or escalation of hospital-level of care  [ ] Decision not to resuscitate, not to intubate, or to de-escalate care because of poor prognosis   [ ] Decision to proceed or not with artificial nutrition   [ ] Parenteral controlled substance

## 2025-04-22 NOTE — PROGRESS NOTE ADULT - ASSESSMENT
69 yo M PMHx chronic HFrEF 43%, ischemic cardiomyopathy s/p CRT-D, AVR, HTN, paroxysmal A-fib (status post ablation 1/14/25), CAD s/p PCI (2019), diabetes, hyperlipidemia, COPD (on 2L home O2), frequent  falls, presented to the ED with complaint of acute onset SOB and 10/10 chest pain. As per EMR pt had VT while in ambulance. Upon ER arrival pt had sinus tachycardia rhythm and with associated hypoxia. The pt was started on BiPAP and placed on a nitroglycerin drip. [facility]  but remained on 4L O2 via NC, still feeling shortness of breath.  Coures complicated by suspected depression and lack of capacity. Pt is frequently hospitalized and leaves AMA.    Chest pain  Likely due to acute hypertensive emergency causing flash pulmonary edema  Acute systolic heart failure on moderately reduced chronic HF  Elevated troponin likely due to hypertensive emergency  - s/p nitroglycerin infusion  - BNP 3.6k  - trop peak 166 >>162  - Lactate 6.4 >> 1.8  - CXR 4/18: Bilateral opacities, congested.  - C/w IV Bumex 2mg BID  - coreg held, unclear why  - acute   - Echo 4/20 noted. EF 30%. This is a drop from Jan 2025 when the LVEF was 43%.  - Daily wt, strict I/O  - spironolactone started on this admission  - planned for cath possibly in future    Agitation  - as per staff yestesrday pt was  intermittently agitated, pulling out IV line and screaming at staff, was  insisting on leaving AMA.   - Psychiatry consulted, patient does not have capacity to leave against medical advice.  - pt was agitated overnight, received trazadone 75 mg and is now sedated  - pt remains on 1:1 constant observation    SIRS present on admission   - clinically on infection  - stable off abx  - received empiric cefepime    Chronic leukocytosis  - needs outpt hematology evaluation    Paroxysmal afib  PPM  - Eliquis held in case ischemic workup is to be done inpatient for acute drop in ejection fracture.  - Heparin drip switched to enoxaparin given improvement in kidney function.  - Metoprolol succinate ER 25mg, amiodarone 200 QD.    CAD  - C/w clopidogrel, atorvastatin.    DVT px    #Progress Note Handoff:  Pending (specify):  Diuresis, wean off o2, monitor mental status  Family discussion:   Disposition: Home___/SNF___/Other________/Unknown at this time____x____

## 2025-04-22 NOTE — PROGRESS NOTE ADULT - ASSESSMENT
71 y/o male with past medical history of HFrEF 43%, ischemic cardiomyopathy s/p CRT-D, AVR, hypertension, paroxysmal A-fib (status post ablation 1/14/25), CAD s/p PCI (2019), diabetes, hyperlipidemia, COPD (on 2L home O2), frequent  falls, presented to the ED with complaint of acute onset SOB and 10/10 chest pain since afternoon. As per EMS, the pt was having runs of VT. Upon ED presentation, the pt was noted to be in sinus tachycardia rhythm and with associated hypoxia. The pt was started on BiPAP and placed on a nitroglycerin drip. He reported improvement in symptoms which resolution of chest pain. Patient was admitted to  step down for hypertensive emergency with flush pulmonary edema and NSTEMI. Patient off nitro drip now and on entresto, metoprolol and spironolactone. BP well controlled for now.  repeat echo showed a drop from 43% to 30%. Patient seen by cardio and planning for cath when stable.    of note Tthe e patient was intermittent angry, and wanted to leave against medical advice. While he was oriented, he did not understand the gravity of his medical condition. He stated that he will take a cab and go home despite realizing that he is short of breath and on oxygen. He was seen by psychiatry who agreed that he presently lacks capacity to make his decisions to leave AMA, and to defer to next of kin.      The patient had few runs of NSVT while on T. His mag and potassium were found to be low (he is on IV bumetanide). His BP remained stable and he remained without chest pain. Electrolyte repletion was ordered. Patient is stable to be downgraded to medicine-telemetry.    #Acute on chronic HFrEF exacerbation  #Hypertensive emergency  # NSTEMI  - BNP 3.6k, trop peak 166 >>162  - Lactate 6.4 >> 1.8  - CXR 4/18: Bilateral opacities, congested.  - C/w IV Bumex 2mg BID, patient is still short of breath and requiring oxygen.  - 4/20 pt pulled out IV, no longer on nitro drip  - hold coreg  - Echo 4/20 noted. EF 30%. This is a drop from Jan 2025 when the LVEF was 43%.  - Daily wt, strict I/O  - CXR 4/20: Unchanged bilateral pleural effusions, opacities.  - Off nitro drip since 4/20 AM, BP better controlled now.  - Started spironolactone 25 QD on 4/21.    #Agitation  - Patient intermittently agitated, pulling out IV line and screaming at staff.  - Patient insisting on leaving AMA, understands that it may result in death given his current medical state, he stated to let him die and that he "wants to get as close to his parents as possible". See progress note from 4/21.  - The above occurred while Valencia, spouse, was on the phone at patient's bedside. She does not want the patient to leave AMA.  - Psychiatry consulted, patient does not have capacity to leave against medical advice.    #SIRS present on admission - Less likely infection  - was started cefepime empirically.  - ID recs appreciated: Clinical picture not consistent with sepsis. DC abx.  - Follow up with blood cultures, prelim negative.  - SARS Flu RSV negative.  - No UA done.    #Paroxysmal afib  #PPM  - Eliquis held in case ischemic workup is to be done inpatient for acute drop in ejection fracture.  - Heparin drip switched to enoxaparin given improvement in kidney function.  - Metoprolol succinate ER 25mg, amiodarone 200 QD.    #CAD  - C/w clopidogrel, atorvastatin.    -------------------------------------------------------------------------------------------------------------------------------------  #DVT PPX: Enoxaparin  #GI PPX: Pantoprazole  #Diet: Dash   #Code Status: full  #Activity Order: IAT    #Handoff: Pending improvement in respiratory status.      For Follow-Up:    [  ] official psych note.  [  ] Improvement in respiratory status.  [  ] Transition to PO diuresis pending improvement in respiration. 69 y/o male with past medical history of HFrEF 43%, ischemic cardiomyopathy s/p CRT-D, AVR, hypertension, paroxysmal A-fib (status post ablation 1/14/25), CAD s/p PCI (2019), diabetes, hyperlipidemia, COPD (on 2L home O2), frequent  falls, presented to the ED with complaint of acute onset SOB and 10/10 chest pain since afternoon. As per EMS, the pt was having runs of VT. Upon ED presentation, the pt was noted to be in sinus tachycardia rhythm and with associated hypoxia. The pt was started on BiPAP and placed on a nitroglycerin drip. He reported improvement in symptoms which resolution of chest pain. Patient was admitted to  step down for hypertensive emergency with flush pulmonary edema and NSTEMI. Patient off nitro drip now and on entresto, metoprolol and spironolactone. BP well controlled for now.  repeat echo showed a drop from 43% to 30%. Patient seen by cardio and planning for cath when stable.    of note Tthe e patient was intermittent angry, and wanted to leave against medical advice. While he was oriented, he did not understand the gravity of his medical condition. He stated that he will take a cab and go home despite realizing that he is short of breath and on oxygen. He was seen by psychiatry who agreed that he presently lacks capacity to make his decisions to leave AMA, and to defer to next of kin.      The patient had few runs of NSVT while on T. His mag and potassium were found to be low (he is on IV bumetanide). His BP remained stable and he remained without chest pain. Electrolyte repletion was ordered. Patient is stable to be downgraded to medicine-telemetry.    #ADHF with pulmonary edema  #Hypertensive emergency with flash pulmonary edema  #HFrEF  # NSTEMI  - BNP 3.6k, trop peak 166 >>162  - Lactate 6.4 >> 1.8  - CXR 4/18: Bilateral opacities, congested  - Echo 4/20 noted. EF 30%. drop from Jan 2025 when the LVEF was 43%.  - Off nitro drip since 4/20 AM, BP better controlled now.  - Continue with GDMT: entresto , spironolactone and metoprolol. Tolerating GDMT for now. Add SGLT2 and  Increase to highest tolerated dose when patient more stable  - BMP BID  - Daily wt, strict I/O  - Plan to cath when patient more stable  - 4/22: Patient Tachypneic on 4L NC, will restart on bipap. Patient was net negative 2L yesterday. Still crackles on exam otherwise no JVD, HJR or edema. Xray showing improved effusions. Continue with Bumex BID. Monitor ins and out. Target -2L      #Agitation  - Patient intermittently agitated, pulling out IV line and screaming at staff.  - Patient insisting on leaving AMA, understands that it may result in death given his current medical state, he stated to let him die and that he "wants to get as close to his parents as possible". See progress note from 4/21.  - The above occurred while Valencia, spouse, was on the phone at patient's bedside. She does not want the patient to leave AMA.  - Psychiatry consulted, patient does not have capacity to leave against medical advice.    #SIRS present on admission - Less likely infection  - was started cefepime empirically.  - ID recs appreciated: Clinical picture not consistent with sepsis. DC abx.  - FBcNGT  - SARS Flu RSV negative.  - No UA done.    #Paroxysmal afib  #PPM  - Eliquis held in case ischemic workup is to be done inpatient for acute drop in ejection fracture.  - Heparin drip switched to enoxaparin given improvement in kidney function.  - Metoprolol succinate ER 25mg, amiodarone 200 QD.    #CAD  - C/w clopidogrel, atorvastatin.    -------------------------------------------------------------------------------------------------------------------------------------  #DVT PPX: Enoxaparin  #GI PPX: Pantoprazole  #Diet: Dash   #Code Status: full  #Activity Order: IAT

## 2025-04-22 NOTE — PROGRESS NOTE ADULT - SUBJECTIVE AND OBJECTIVE BOX
Patient is a 70y old Male who presents with a chief complaint of Tachycardia and Pulmonary Edema (19 Apr 2025 08:46)    No acute or major events overnight. Patient was seen at bedside. Patient is AOx3, following commands. Patient tachypneic, taking in words on 4L NC. No complaints  ROS was negative  Patient is HD stable, afebrile on 4L NC    ALLERGIES:  Bactrim (Unknown)    MEDICATIONS:  STANDING MEDICATIONS  aMIOdarone    Tablet 200 milliGRAM(s) Oral daily  atorvastatin 40 milliGRAM(s) Oral at bedtime  buMETAnide Injectable 2 milliGRAM(s) IV Push <User Schedule>  chlorhexidine 2% Cloths 1 Application(s) Topical <User Schedule>  clopidogrel Tablet 75 milliGRAM(s) Oral daily  enoxaparin Injectable 80 milliGRAM(s) SubCutaneous every 12 hours  insulin glargine Injectable (LANTUS) 15 Unit(s) SubCutaneous at bedtime  insulin lispro (ADMELOG) corrective regimen sliding scale   SubCutaneous three times a day before meals  insulin lispro Injectable (ADMELOG) 5 Unit(s) SubCutaneous three times a day before meals  metoprolol succinate ER 25 milliGRAM(s) Oral daily  mupirocin 2% Nasal 1 Application(s) Both Nostrils every 12 hours  sacubitril 24 mG/valsartan 26 mG 1 Tablet(s) Oral two times a day  spironolactone 25 milliGRAM(s) Oral every 24 hours    PRN MEDICATIONS  acetaminophen     Tablet .. 650 milliGRAM(s) Oral every 6 hours PRN  aluminum hydroxide/magnesium hydroxide/simethicone Suspension 30 milliLiter(s) Oral every 4 hours PRN  guaiFENesin  milliGRAM(s) Oral every 12 hours PRN  ondansetron Injectable 4 milliGRAM(s) IV Push every 8 hours PRN  zolpidem 5 milliGRAM(s) Oral at bedtime PRN      VITALS LAST 24H:  Vital Signs Last 24 Hrs  T(C): 36.6 (22 Apr 2025 05:23), Max: 36.8 (21 Apr 2025 17:47)  T(F): 97.9 (22 Apr 2025 05:23), Max: 98.3 (21 Apr 2025 17:47)  HR: 98 (22 Apr 2025 05:23) (84 - 99)  BP: 131/81 (22 Apr 2025 05:23) (131/81 - 174/94)  BP(mean): 98 (22 Apr 2025 05:23) (96 - 123)  RR: 17 (22 Apr 2025 05:23) (17 - 20)  SpO2: 94% (22 Apr 2025 05:29) (89% - 100%)    Parameters below as of 22 Apr 2025 05:29  Patient On (Oxygen Delivery Method): nasal cannula  O2 Flow (L/min): 4      PHYSICAL EXAM:  GENERAL: Increased work of breathing, tachypneic,   HEENT: No JVD, no HJR  CHEST/LUNG: Crackles bilaterally with inspiratory wheezing  HEART: Regular rate and rhythm, no murmurs  ABDOMEN: Soft, nontender, nondistended, Bowel sounds present  EXTREMITIES:  2+ Peripheral Pulses, Ne edema  NERVOUS SYSTEM:  A&Ox3, no focal deficits, opening eyes to stimulation, following simple commands      LABS:                        11.9   16.41 )-----------( 404      ( 22 Apr 2025 07:13 )             36.5     04-22    140  |  101  |  20  ----------------------------<  262[H]  4.1   |  26  |  1.2    Ca    9.2      22 Apr 2025 07:13  Mg     2.3     04-22    TPro  6.2  /  Alb  3.5  /  TBili  0.5  /  DBili  x   /  AST  10  /  ALT  9   /  AlkPhos  95  04-22    PTT - ( 21 Apr 2025 11:36 )  PTT:141.6 sec  Urinalysis Basic - ( 22 Apr 2025 07:13 )    Color: x / Appearance: x / SG: x / pH: x  Gluc: 262 mg/dL / Ketone: x  / Bili: x / Urobili: x   Blood: x / Protein: x / Nitrite: x   Leuk Esterase: x / RBC: x / WBC x   Sq Epi: x / Non Sq Epi: x / Bacteria: x                RADIOLOGY:  CXR      CT

## 2025-04-22 NOTE — CONSULT NOTE ADULT - CONVERSATION DETAILS
Met with patient at bedside.  Patient refused to speak with me. Called and spoke with son Neto. Palliative care introduced. He was able to provide a medical history and hospital course. He noted that the patient has been hospitalized frequently and often leaves AMA.  He notes after the patient comes home, he often forgets to take his medications, and then calls to come back to the hospital.   We discussed code status and GOC.  He noted that he never spoke to the patient about code status, but believes the patient would want CPR/intubation based on his wish to continue to go to the hospital, and the fact that he was intubated and extubated earlier this year.  We discussed that given his extensive cardiac and pulmonary history, he would likely have very poor overall prognosis and quality of life should he go into cardiac arrest.  He wants the patient to remain full code, but will consider options moving forward.

## 2025-04-22 NOTE — PHYSICAL THERAPY INITIAL EVALUATION ADULT - GENERAL OBSERVATIONS, REHAB EVAL
Pt attempted for PT IE, asleep and despite verbal and tactile stimuli, pt unable to arouse. Per RN, pt received something overnight and has been minimally arousable since. PT to f/u at next available session as appropriate. Pt attempted for PT IE, asleep. Despite verbal and tactile stimuli, pt unable to arouse. Per RN, pt received something overnight and has been minimally arousable since. PT to f/u at next available session as appropriate.

## 2025-04-22 NOTE — PROGRESS NOTE ADULT - SUBJECTIVE AND OBJECTIVE BOX
CHIEF COMPLAINT:    Patient is a 70y old  Male who presents with a chief complaint of Tachycardia and Pulmonary Edema     INTERVAL HPI/OVERNIGHT EVENTS:    Patient seen and examined at bedside. No acute overnight events occurred.    ROS: Difficult to obtain as pt sedated from trazodone.     Medications:  Standing  aMIOdarone    Tablet 200 milliGRAM(s) Oral daily  atorvastatin 40 milliGRAM(s) Oral at bedtime  buMETAnide Injectable 2 milliGRAM(s) IV Push <User Schedule>  chlorhexidine 2% Cloths 1 Application(s) Topical <User Schedule>  clopidogrel Tablet 75 milliGRAM(s) Oral daily  enoxaparin Injectable 80 milliGRAM(s) SubCutaneous every 12 hours  insulin glargine Injectable (LANTUS) 15 Unit(s) SubCutaneous at bedtime  insulin lispro (ADMELOG) corrective regimen sliding scale   SubCutaneous three times a day before meals  insulin lispro Injectable (ADMELOG) 5 Unit(s) SubCutaneous three times a day before meals  metoprolol succinate ER 25 milliGRAM(s) Oral daily  mupirocin 2% Nasal 1 Application(s) Both Nostrils every 12 hours  potassium chloride    Tablet ER 20 milliEquivalent(s) Oral once  sacubitril 24 mG/valsartan 26 mG 1 Tablet(s) Oral two times a day  spironolactone 25 milliGRAM(s) Oral every 24 hours    PRN Meds  acetaminophen     Tablet .. 650 milliGRAM(s) Oral every 6 hours PRN  aluminum hydroxide/magnesium hydroxide/simethicone Suspension 30 milliLiter(s) Oral every 4 hours PRN  guaiFENesin  milliGRAM(s) Oral every 12 hours PRN  ondansetron Injectable 4 milliGRAM(s) IV Push every 8 hours PRN  zolpidem 5 milliGRAM(s) Oral at bedtime PRN        Vital Signs:    T(F): 97.9 (04-22-25 @ 05:23), Max: 98.3 (04-21-25 @ 17:47)  HR: 98 (04-22-25 @ 05:23) (84 - 99)  BP: 131/81 (04-22-25 @ 05:23) (131/81 - 174/94)  RR: 17 (04-22-25 @ 05:23) (17 - 20)  SpO2: 94% (04-22-25 @ 05:29) (89% - 100%)  I&O's Summary    21 Apr 2025 07:01  -  22 Apr 2025 07:00  --------------------------------------------------------  IN: 830 mL / OUT: 3350 mL / NET: -2520 mL      POCT Blood Glucose.: 264 mg/dL (22 Apr 2025 07:53)  POCT Blood Glucose.: 326 mg/dL (21 Apr 2025 22:37)  POCT Blood Glucose.: 330 mg/dL (21 Apr 2025 21:32)  POCT Blood Glucose.: 305 mg/dL (21 Apr 2025 16:37)  POCT Blood Glucose.: 277 mg/dL (21 Apr 2025 11:37)      PHYSICAL EXAM:  GENERAL:  NAD  SKIN: No rashes or lesions  HEENT: Atraumatic. Normocephalic. Anicteric  NECK:  No JVD.   PULMONARY: Clear to ausculation bilaterally. No wheezing. No rales  CVS: Normal S1, S2. Regular rate and rhythm. No murmurs.  ABDOMEN/GI: Soft, Nontender, Nondistended; Bowel sounds are present  EXTREMITIES:  No edema B/L LE.  NEUROLOGIC:  shakes head yes and no, can follow simple commands  PSYCH: sedated      LABS:                        11.9   16.41 )-----------( 404      ( 22 Apr 2025 07:13 )             36.5     04-22    140  |  101  |  20  ----------------------------<  262[H]  4.1   |  26  |  1.2    Ca    9.2      22 Apr 2025 07:13  Mg     2.3     04-22    TPro  6.2  /  Alb  3.5  /  TBili  0.5  /  DBili  x   /  AST  10  /  ALT  9   /  AlkPhos  95  04-22    PTT - ( 21 Apr 2025 11:36 )  PTT:141.6 sec          RADIOLOGY & ADDITIONAL TESTS:  Imaging or report Personally Reviewed:  [ ] YES  [ ] NO -->no new images    Telemetry reviewed independently - NSR, no acute events  EKG reviewed independently -->no new EKGs    Consultant(s) Notes Reviewed:  [ ] YES  [ ] NO  Care Discussed with Consultants/Other Providers [ ] YES  [ ] NO    Case discussed with resident  Care discussed with pt

## 2025-04-22 NOTE — CONSULT NOTE ADULT - SUBJECTIVE AND OBJECTIVE BOX
HPI:  70 year old male pmhx afib on eliquis with aicd, cad, chf, dm, hld, htn presented to the ED with complaint of acute onset SOB and 10/10 chest pain since yesterday afternoon.  As per EMS, the pt was having runs of VT. Upon ED presentation, the pt was noted to be in sinus tachycardia rhythm and with associated hypoxia. The pt was started on BiPAP and placed on a nitroglycerin drip. Pt states he is feeling better now, CP is 0/10 and is able to take deeper breaths now which he couldnt do prior. Otherwise denies any fever, chills, headache, changes in vision, cough, congestion, n/v/d, abd pain, constipation, urinary complaints, lower extremity pain/swelling.  Pt laying in bed comfortably and in good spirits joking and pleasant.  (19 Apr 2025 05:34)        PAST MEDICAL & SURGICAL HISTORY  CHF (congestive heart failure)    Diabetes    CAD (coronary artery disease)    Chronic obstructive pulmonary disease (COPD)    HTN (hypertension)    Stented coronary artery    Dyslipidemia    GERD (gastroesophageal reflux disease)    Afib    CVA (cerebral vascular accident)    H/O heart artery stent    Heart valve replaced  aortic    History of Nissen fundoplication        FAMILY HISTORY:  FAMILY HISTORY:      SOCIAL HISTORY:  []smoker  []Alcohol  []Drug    ALLERGIES:  Bactrim (Unknown)      MEDICATIONS:  MEDICATIONS  (STANDING):  aMIOdarone    Tablet 200 milliGRAM(s) Oral daily  apixaban 5 milliGRAM(s) Oral every 12 hours  atorvastatin 40 milliGRAM(s) Oral at bedtime  carvedilol 25 milliGRAM(s) Oral every 12 hours  cefepime   IVPB 2000 milliGRAM(s) IV Intermittent daily  clopidogrel Tablet 75 milliGRAM(s) Oral daily  furosemide   Injectable 60 milliGRAM(s) IV Push every 6 hours  insulin lispro (ADMELOG) corrective regimen sliding scale   SubCutaneous three times a day before meals  metoprolol succinate ER 50 milliGRAM(s) Oral daily  nitroglycerin  Infusion 400 MICROgram(s)/Min (120 mL/Hr) IV Continuous <Continuous>  pantoprazole    Tablet 40 milliGRAM(s) Oral before breakfast  potassium chloride    Tablet ER 40 milliEquivalent(s) Oral daily  sacubitril 24 mG/valsartan 26 mG 1 Tablet(s) Oral two times a day  spironolactone 25 milliGRAM(s) Oral daily    MEDICATIONS  (PRN):  acetaminophen     Tablet .. 650 milliGRAM(s) Oral every 6 hours PRN Temp greater or equal to 38C (100.4F), Mild Pain (1 - 3)  aluminum hydroxide/magnesium hydroxide/simethicone Suspension 30 milliLiter(s) Oral every 4 hours PRN Dyspepsia  ondansetron Injectable 4 milliGRAM(s) IV Push every 8 hours PRN Nausea and/or Vomiting  zolpidem 5 milliGRAM(s) Oral at bedtime PRN Insomnia      HOME MEDICATIONS:  Home Medications:  acetaminophen 325 mg oral tablet: 2 tab(s) orally every 6 hours As needed Temp greater or equal to 38C (100.4F), Mild Pain (1 - 3) (14 Apr 2025 18:05)  atorvastatin 40 mg oral tablet: 1 tab(s) orally once a day (14 Apr 2025 18:05)  clopidogrel 75 mg oral tablet: 1 tab(s) orally once a day (14 Apr 2025 18:05)  eszopiclone 3 mg oral tablet: 1 tab(s) orally once a day (at bedtime) (14 Apr 2025 18:05)  ezetimibe 10 mg oral tablet: 1 orally once a day (14 Apr 2025 18:05)  fenofibrate 145 mg oral tablet: 1 orally once a day (14 Apr 2025 18:05)  metFORMIN 500 mg oral tablet: 1 tab(s) orally 2 times a day (14 Apr 2025 18:05)  Trulicity Pen 1.5 mg/0.5 mL subcutaneous solution: 1.5 milligram(s) subcutaneously once a week (14 Apr 2025 18:05)      VITALS:   T(F): 97.8 (04-18 @ 22:04), Max: 97.8 (04-18 @ 22:04)  HR: 88 (04-19 @ 07:37) (88 - 147)  BP: 144/79 (04-19 @ 07:37) (112/59 - 188/106)  BP(mean): 85 (04-18 @ 23:04) (78 - 100)  RR: 18 (04-19 @ 07:37) (18 - 20)  SpO2: 93% (04-19 @ 08:10) (93% - 99%)    I&O's Summary      REVIEW OF SYSTEMS:  CONSTITUTIONAL: No weakness, fevers or chills  EYES: No visual changes  ENT: No vertigo or throat pain   NECK: No pain or stiffness  RESPIRATORY: No cough, wheezing, hemoptysis;  +shortness of breath  CARDIOVASCULAR: No chest pain or palpitations  GASTROINTESTINAL: No abdominal or epigastric pain. No nausea, vomiting, or hematemesis; No diarrhea or constipation. No melena or hematochezia.  GENITOURINARY: No dysuria, frequency or hematuria  NEUROLOGICAL: No numbness or weakness  SKIN: No itching, no rashes  MSK: no    PHYSICAL EXAM:  NEURO: patient is awake , alert and oriented  GEN: Not in acute distress  NECK: no thyroid enlargement, no JVD  LUNGS: B/L crackles   CARDIOVASCULAR: S1/S2 present, PPM  ABD: Soft, non-tender, non-distended, +BS  EXT: + B/L LE Edema  SKIN: Intact    LABS:                        12.7   16.38 )-----------( 621      ( 18 Apr 2025 22:20 )             41.4     04-18    135  |  92[L]  |  26[H]  ----------------------------<  446[H]  4.0   |  22  |  1.7[H]    Ca    9.7      18 Apr 2025 22:20    TPro  7.3  /  Alb  4.1  /  TBili  0.5  /  DBili  x   /  AST  14  /  ALT  14  /  AlkPhos  119[H]  04-18              Troponin trend:  4/14: 131-->125  4/18: 145          RADIOLOGY:  -CXR:  < from: Xray Chest 1 View- PORTABLE-Urgent (04.18.25 @ 22:23) >  IMPRESSION: Low lung volume.    Bilateral opacities, unchanged.    Left sided permanent pacemaker.    --- End of Report ---            AISLINN BERTRAND MD; Attending Radiologist  This document has been electronically signed. Apr 19 2025  5:59AM    < end of copied text >    -TTE:  < from: TTE Echo Complete w/ Contrast w/ Doppler (01.15.25 @ 11:15) >  Summary:   1. Mildly to moderately decreased global left ventricular systolic   function.   2. LV Ejection Fraction by Beck's Method with a biplane EF of 43 %.   3. Mildly increased LV wall thickness.   4. The left ventricular diastolic function could not be assessed in this   study.   5. Mildly enlarged right ventricle.   6. Moderately enlarged left atrium.   7. Normal right atrial size.   8. Mild mitral valve regurgitation.   9. Mild thickening of the anterior and posterior mitral valve leaflets.  10. Mild tricuspid regurgitation.  11. Bioprosthetic aortic valve was not well visualized. Mean PG 16 mmHg,   RADHA 1.3 cm^2.  12. Estimated pulmonary artery systolic pressure is 41.2 mmHg assuming a   right atrial pressure of 8 mmHg, which is consistent with mild pulmonary   hypertension.  13. There is no evidence of pericardial effusion.    < end of copied text >      < from: CT Chest No Cont (03.28.25 @ 19:55) >  IMPRESSION:    Since  January 3, 2025      Breathing artifact limits lung parenchymal assessment.      Patent central airways. Slight interval increase in bilateral small   pleural effusions. .      Interval development of bilateral symmetric airspace opacities with crazy   paving noted primarily within both upper lobes. Constellation of findings   suggests that of congestive failure (correlate with physical findings).      --- End of Report ---            JAZLYN LO MD; Attending Radiologist  This document has been electronically signed. Mar 29 2025  8:58PM    < end of copied text >      ECG:  < from: 12 Lead ECG (04.14.25 @ 14:55) >  Diagnosis Line    Sinus rhythm with short MO with Fusion complexes  Right axis deviation  Non-specific intra-ventricular conduction block  Cannot rule out Septal infarct , age undetermined  T wave abnormality, consider lateral ischemia  Abnormal ECG    Confirmed by Suleman Bhatt ([id number]) on 4/14/2025 4:26:19 PM    < end of copied text >    ASSESSMENT/ PLAN:  70 year old male pmhx afib on eliquis with aicd, cad, chf, dm, hld, htn presented to the ED with complaint of acute onset SOB and 10/10 chest pain since yesterday afternoon.  As per EMS, the pt was having runs of VT. Upon ED presentation, the pt was noted to be in sinus tachycardia rhythm and with associated hypoxia. The pt was started on BiPAP and placed on a nitroglycerin drip.    Patient with recent admission on 4/14 for similar episode- troponins were elevated- patient signed out AMA at that time.  He does not remember his outpatient cardiologists name  Currently off NTG gtt- HR WNL on telemetry  s. No c/o chest pain at present- Still with sob- home o2 dependent    Impression:  # HFmrEF exacerbation   - pt presented with chest pain and sob  - + signs of volume overload on exam  - TTE: EF 43%/ mild-mod decreased global LVSF  - CXR- Bilateral opacities, unchanged  #Elevated Troponins  - No chest pain/ but still with sob  - Leukocytosis: 16  - Current EKG pending  Plan:  - Telemetry monitoring  - Continue to trend trops  - Obtain EKG  - Obtain TTE  - Continue with IV Diuresis for now Keep O>I  - Strict I/Os and standing daily weights  - Fluid restriction   - Trend BMP 2/2 diuresis and replete as needed, k>4, mg> 2  - Would interrogate PPM/ [employer]  *****Incomplete Note; Attending Attestation to follow*****    
MONIQUE LYONS  70y, Male    Allergies    Bactrim (Unknown)    Intolerances        LOS      HPI  HPI:  70 year old male pmhx afib on eliquis with aicd, cad, chf, dm, hld, htn presented to the ED with complaint of acute onset SOB and 10/10 chest pain since yesterday afternoon.  As per EMS, the pt was having runs of VT. Upon ED presentation, the pt was noted to be in sinus tachycardia rhythm and with associated hypoxia. The pt was started on BiPAP and placed on a nitroglycerin drip. Pt states he is feeling better now, CP is 0/10 and is able to take deeper breaths now which he couldnt do prior. Otherwise denies any fever, chills, headache, changes in vision, cough, congestion, n/v/d, abd pain, constipation, urinary complaints, lower extremity pain/swelling.  Pt laying in bed comfortably and in good spirits joking and pleasant.  (19 Apr 2025 05:34)      INFECTIOUS DISEASE HISTORY:  ID consulted for antimicrobial recommendations.     Prior hospital charts reviewed [Yes]  Primary team notes reviewed [Yes]  Other consultant notes reviewed [Yes]    REVIEW OF SYSTEMS:  CONSTITUTIONAL: No fever or chills  HEENT: No sore throat  RESPIRATORY: No cough, no shortness of breath  CARDIOVASCULAR: No chest pain or palpitations  GASTROINTESTINAL: No abdominal or epigastric pain  GENITOURINARY: No dysuria  NEUROLOGICAL: No headache/dizziness  MSK: No joint pain, erythema, or swelling; no back pain  SKIN: No itching, rashes  All other ROS negative except noted above    PAST MEDICAL & SURGICAL HISTORY:  CHF (congestive heart failure)      Diabetes      CAD (coronary artery disease)      Chronic obstructive pulmonary disease (COPD)      HTN (hypertension)      Stented coronary artery      Dyslipidemia      GERD (gastroesophageal reflux disease)      Afib      CVA (cerebral vascular accident)      H/O heart artery stent      Heart valve replaced  aortic      History of Nissen fundoplication          SOCIAL HISTORY:  - No recent travel    FAMILY HISTORY: No pertinent PMH for first degree relatives.     ANTIMICROBIALS:  cefepime   IVPB 2000 daily      ANTIMICROBIALS (past 90 days):  MEDICATIONS  (STANDING):  cefepime   IVPB   100 mL/Hr IV Intermittent (04-18-25 @ 22:57)    cefepime   IVPB   100 mL/Hr IV Intermittent (04-19-25 @ 11:41)        OTHER MEDS:   MEDICATIONS  (STANDING):  acetaminophen     Tablet .. 650 every 6 hours PRN  aluminum hydroxide/magnesium hydroxide/simethicone Suspension 30 every 4 hours PRN  aMIOdarone    Tablet 200 daily  apixaban 5 every 12 hours  atorvastatin 40 at bedtime  clopidogrel Tablet 75 daily  furosemide   Injectable 40 once  furosemide   Injectable 60 every 6 hours  insulin lispro (ADMELOG) corrective regimen sliding scale  three times a day before meals  nitroglycerin  Infusion 20 <Continuous>  ondansetron Injectable 4 every 8 hours PRN  sacubitril 24 mG/valsartan 26 mG 1 two times a day  spironolactone 25 daily  zolpidem 5 at bedtime PRN      VITALS:  Vital Signs Last 24 Hrs  T(F): 97.7 (04-19-25 @ 15:01), Max: 98.7 (04-19-25 @ 12:50)    Vital Signs Last 24 Hrs  HR: 82 (04-19-25 @ 14:00) (67 - 147)  BP: 139/79 (04-19-25 @ 14:00) (112/59 - 188/106)  RR: 22 (04-19-25 @ 15:01)  SpO2: 95% (04-19-25 @ 14:00) (91% - 99%)  Wt(kg): --    EXAM:  GENERAL: Dyspneic while conversating. On NC  HEAD: No head lesions  NECK: Supple  CHEST/LUNG: Shallow breath sounds.   HEART: S1 S2  ABDOMEN: Soft, nontender  EXTREMITIES: +2 edema bilaterally   NERVOUS SYSTEM: Alert and oriented to person  MSK: No joint erythema, swelling or pain  SKIN: No rashes or lesions, no superficial thrombophlebitis    Labs:                        10.7   15.67 )-----------( 452      ( 19 Apr 2025 11:44 )             33.6     04-19    134[L]  |  97[L]  |  26[H]  ----------------------------<  301[H]  4.2   |  25  |  1.7[H]    Ca    9.4      19 Apr 2025 11:44  Mg     2.7     04-19    TPro  6.3  /  Alb  3.5  /  TBili  0.5  /  DBili  x   /  AST  11  /  ALT  11  /  AlkPhos  101  04-19      WBC Trend:  WBC Count: 15.67 (04-19-25 @ 11:44)  WBC Count: 16.38 (04-18-25 @ 22:20)      Auto Neutrophil #: 7.72 K/uL (01-14-25 @ 05:40)  Auto Neutrophil #: 12.27 K/uL (01-11-25 @ 06:13)  Auto Neutrophil #: 15.48 K/uL (01-10-25 @ 05:31)  Auto Neutrophil #: 12.68 K/uL (01-09-25 @ 05:36)  Auto Neutrophil #: 9.11 K/uL (01-08-25 @ 06:22)      Creatine Trend:  Creatinine: 1.7 (04-19)  Creatinine: 1.7 (04-18)  Creatinine: 1.3 (04-14)      Liver Biochemical Testing Trend:  Alanine Aminotransferase (ALT/SGPT): 11 (04-19)  Alanine Aminotransferase (ALT/SGPT): 14 (04-18)  Alanine Aminotransferase (ALT/SGPT): 14 (04-14)  Alanine Aminotransferase (ALT/SGPT): 13 (03-29)  Alanine Aminotransferase (ALT/SGPT): 17 (03-28)  Aspartate Aminotransferase (AST/SGOT): 11 (04-19-25 @ 11:44)  Aspartate Aminotransferase (AST/SGOT): 14 (04-18-25 @ 22:20)  Aspartate Aminotransferase (AST/SGOT): 14 (04-14-25 @ 14:59)  Aspartate Aminotransferase (AST/SGOT): 14 (03-29-25 @ 06:23)  Aspartate Aminotransferase (AST/SGOT): 26 (03-28-25 @ 06:08)  Bilirubin Total: 0.5 (04-19)  Bilirubin Total: 0.5 (04-18)  Bilirubin Total: 0.3 (04-14)  Bilirubin Total: 0.6 (03-29)  Bilirubin Total: 0.4 (03-28)      Trend LDH          Urinalysis Basic - ( 19 Apr 2025 11:44 )    Color: x / Appearance: x / SG: x / pH: x  Gluc: 301 mg/dL / Ketone: x  / Bili: x / Urobili: x   Blood: x / Protein: x / Nitrite: x   Leuk Esterase: x / RBC: x / WBC x   Sq Epi: x / Non Sq Epi: x / Bacteria: x        MICROBIOLOGY:    Male          HIV-1/2 Combo Result: Nonreact (01-04-25 @ 05:58)    Troponin T, High Sensitivity Result: 166 (04-19)  Troponin T, High Sensitivity Result: 145 (04-18)  Troponin T, High Sensitivity Result: 125 (04-14)  Troponin T, High Sensitivity Result: 131 (04-14)    Lactate, Blood: 1.8 (04-19 @ 11:44)  Blood Gas Venous - Lactate: 2.0 (04-19 @ 02:59)  Blood Gas Venous - Lactate: 6.4 (04-18 @ 22:15)    A1C with Estimated Average Glucose Result: 9.7 % (03-30-25 @ 06:05)  A1C with Estimated Average Glucose Result: 9.9 % (12-28-24 @ 07:18)      INFLAMMATORY MARKERS      RADIOLOGY & ADDITIONAL TESTS:  I have personally reviewed the imagings.  CXR  Xray Chest 1 View- PORTABLE-Urgent:   ACC: [id number] EXAM:  XR CHEST PORTABLE URGENT 1V   ORDERED BY: JOHANNA BRUCE     PROCEDURE DATE:  04/18/2025          INTERPRETATION:  CLINICAL HISTORY: Shortness of breath    COMPARISON: April 14, 2025.    TECHNIQUE: Portable frontal chest radiograph. Low lung volume.    FINDINGS:    Support devices: Left-sided permanent pacemaker.    Cardiac/mediastinum/hilum: Stable.    Lung parenchyma/Pleura: Bilateral opacities, unchanged. No pneumothorax   is seen.    Skeleton/soft tissues: Stable.      IMPRESSION: Low lung volume.    Bilateral opacities, unchanged.    Left sided permanent pacemaker.    --- End of Report ---            AISLINN BERTRAND MD; Attending Radiologist  This document has been electronically signed. Apr 19 2025  5:59AM (04-18-25 @ 22:23)      CT    < from: CT Chest No Cont (03.28.25 @ 19:55) >    Tubes/Lines/Devices : Left-sided ICD, satisfactory position..  Mediastinum/hilum: .Multipleprominent likely reactive mediastinal lymph   nodes.  Chest Wall/ Breasts: Mild gynecomastia   Heart/Great Vessels:No pericardial effusions. Poststernotomy/aortic   valve surgery. Extensive coronary artery calcifications.  Stable dilatation ascendingaorta measuring 4.3 cm, 301-110  Abdomen: Streak artifact from ICD leads obscures upper abdomen  Bones and soft tissues: Degenerative changes cervical and to a lesser   degree thoracic spine  Lungs, Pleura, and Airways: Breathing artifact limits lungparenchymal   assessment.  Patent central airways. Slight interval increase in bilateral small   pleural effusions. .  Interval development of bilateral symmetric airspace opacities with crazy   paving noted primarily within both upper lobes. Constellation of findings   suggests that of congestive failure.,  9 mm solid nodule, medial right middle lobe (301-149)    IMPRESSION:    < end of copied text >  < from: TTE Echo Complete w/ Contrast w/ Doppler (01.15.25 @ 11:15) >  Summary:   1. Mildly to moderately decreased global left ventricular systolic   function.   2. LV Ejection Fraction by Beck's Method with a biplane EF of 43 %.   3. Mildly increased LV wall thickness.   4. The left ventricular diastolic function could not be assessed in this   study.   5. Mildly enlarged right ventricle.   6. Moderately enlarged left atrium.   7. Normal right atrial size.   8. Mild mitral valve regurgitation.   9. Mild thickening of the anterior and posterior mitral valve leaflets.  10. Mild tricuspid regurgitation.  11. Bioprosthetic aortic valve was not well visualized. Mean PG 16 mmHg,   RADHA 1.3 cm^2.  12. Estimated pulmonary artery systolic pressure is 41.2 mmHg assuming a   right atrial pressure of 8 mmHg, which is consistent with mild pulmonary   hypertension.  13. There is no evidence of pericardial effusion.    < end of copied text >    CARDIOLOGY TESTING  12 Lead ECG:   Ventricular Rate 94 BPM    Atrial Rate 92 BPM    P-R Interval 120 ms    QRS Duration 152 ms    Q-T Interval 452 ms    QTC Calculation(Bazett) 565 ms    R Axis -87 degrees    T Axis 92 degrees    Diagnosis Line Atrial-sensed ventricular-paced rhythmwith occasional AV dual-paced complexes   and with occasional sinus complexes and with occasional Premature ventricular  complexes  Biventricular pacemaker detected  Abnormal ECG    Confirmed by MD STEVENSON GURUPRASAD ([id number]) on 4/19/2025 10:54:42 AM  (04-19-25 @ 04:57)  12 Lead ECG:   Ventricular Rate 96 BPM    Atrial Rate 105 BPM    QRS Duration 154 ms    Q-T Interval 452 ms    QTC Calculation(Bazett) 571 ms    R Axis 268 degrees    T Axis 93 degrees    Diagnosis Line Ventricular-paced rhythm with occasional AV dual-paced complexes and with  occasional supraventricular complexes and with occasional Premature  ventricular complexes  Biventricular pacemaker detected  Abnormal ECG    Confirmed by MD STEVENSON GURUPRASAD ([id number]) on 4/19/2025 10:54:14 AM (04-19-25 @ 04:55)            
Pt is a 70 year old male who presents with complaint of acute onset SOB. As per EMS, the pt was having runs of VT. Upon ED presentation, the pt was noted to be in sinus tachycardia rhythm and with associated hypoxia. The pt was started on BiPAP and placed on a nitroglycerin drip. Upon critical care assessment, the pt is awake and alert and answering all questions appropriately. The pt is asking for something to eat. There was significant artifact noted on the cardiac monitor and therefore EKG was requested to assess the pt's HR accurately. The pt was transitioned off of BiPAP and on to NC without any adverse effects. Nitro drip is also titrated off. No other complaints or concerns noted at this time.    ROS: Denies CP, SOB, dyspnea, ABD pain, LE pain, n/v/d/constipation    T(C): 36.6 (04-18-25 @ 22:04), Max: 36.6 (04-18-25 @ 22:04)  HR: 130 (04-19-25 @ 04:48) (92 - 147)  BP: 157/81 (04-19-25 @ 04:48) (112/59 - 188/106)  RR: 20 (04-18-25 @ 22:04) (20 - 20)  SpO2: 99% (04-18-25 @ 22:04) (99% - 99%)    CONSTITUTIONAL:  no apparent distress  EYES: No conjunctival or scleral injection, non-icteric  ENMT: Oral mucosa with moist membranes.    RESP: No respiratory distress, no use of accessory muscles  CV: RRR, +S1S  GI: Soft, NT, ND  LYMPH: No cervical LAD or tenderness  MSK: No digital clubbing or cyanosis  SKIN: No rashes or ulcers noted  NEURO:  sensation intact in upper and lower extremities b/l to light touch   PSYCH: Appropriate insight/judgment; A+O x 3, mood and affect appropriate                          12.7   16.38 )-----------( 621      ( 18 Apr 2025 22:20 )             41.4     04-18    135  |  92[L]  |  26[H]  ----------------------------<  446[H]  4.0   |  22  |  1.7[H]    Ca    9.7      18 Apr 2025 22:20    TPro  7.3  /  Alb  4.1  /  TBili  0.5  /  DBili  x   /  AST  14  /  ALT  14  /  AlkPhos  119[H]  04-18  
CC:  chest pain    HPI:  70 year old male pmhx afib on eliquis with aicd, cad, chf, dm, hld, htn presented to the ED with complaint of acute onset SOB and 10/10 chest pain since yesterday afternoon.  As per EMS, the pt was having runs of VT. Upon ED presentation, the pt was noted to be in sinus tachycardia rhythm and with associated hypoxia. The pt was started on BiPAP and placed on a nitroglycerin drip. Pt states he is feeling better now, CP is 0/10 and is able to take deeper breaths now which he couldnt do prior. Otherwise denies any fever, chills, headache, changes in vision, cough, congestion, n/v/d, abd pain, constipation, urinary complaints, lower extremity pain/swelling.  Pt laying in bed comfortably and in good spirits joking and pleasant.  (19 Apr 2025 05:34)    PERTINENT PM/SXH:   CHF (congestive heart failure)    Diabetes    CAD (coronary artery disease)    Chronic obstructive pulmonary disease (COPD)    HTN (hypertension)    Stented coronary artery    Dyslipidemia    GERD (gastroesophageal reflux disease)    Afib    CVA (cerebral vascular accident)      H/O heart artery stent    Heart valve replaced    History of Nissen fundoplication      FAMILY HISTORY:    None pertinent    ITEMS NOT CHECKED ARE NOT PRESENT    SOCIAL HISTORY:   Significant other/partner[ ]  Children[ ]  Sabianist/Spirituality:  Substance hx:  [ ]   Tobacco hx:  [ ]   Alcohol hx: [ ]   Living Situation: [ x]Home  [ ]Long term care  [ ]Rehab [ ]Other  Home Services: [ ] HHA [ ] Visting RN [ ] Hospice  Occupation:  Home Opioid hx:  [ ] Y [ ] N [ x] I-Stop Reference No:     Reference #: [id number]        Patient Demographic Information (PDI)       PDI	First Name	Last Name	Birth Date	Gender	Street Address	[facility]	Zip Code  A	Neto Frey	1955	Male	[address] [facility]	[address]    Prescription Information      PDI Filter:    PDI	Current Rx	Drug Type	Rx Written	Rx Dispensed	Drug	Quantity	Days Supply	Prescriber Name	Prescriber ERYN #	Payment Method	Dispenser  A	N		03/03/2025	03/19/2025	eszopiclone 3 mg tablet	30	30	Martin Shaver DO	[id number]	Medicare	[facility] [id number]  A	N		02/11/2025	02/13/2025	eszopiclone 3 mg tablet	30	30	BeylinMartin dickey DO	[id number]	Medicare	[facility] [id number]  A	N		07/08/2024	12/12/2024	eszopiclone 3 mg tablet	30	30	RobertonabilsonMartin DO	[id number]	Medicare	[facility] [id number]  A	N		08/20/2024	11/12/2024	eszopiclone 3 mg tablet	30	30	BeyMartin chen DO	[id number]	Medicare	[facility] [id number]  A	N		08/20/2024	10/09/2024	eszopiclone 3 mg tablet	30	30	BeylinsonMartin DO	[id number]	Medicare	[facility] [id number]  A	N		08/20/2024	09/07/2024	eszopiclone 3 mg tablet	30	30	RobertolinsonMartin DO	[id number]	Medicare	[facility] [id number]  A	N		07/08/2024	08/08/2024	eszopiclone 3 mg tablet	30	30	BeylinsonMartin DO	[id number]	Medicare	[facility] [id number]  A	N		07/08/2024	07/08/2024	eszopiclone 3 mg tablet	30	30	BeylinsonMartin DO	[id number]	Medicare	[facility] [id number]  A	N		01/08/2024	06/07/2024	eszopiclone 3 mg tablet	30	30	BeylinsonMartin DO	[id number]	Medicare	[facility] [id number]  A	N		01/08/2024	05/08/2024	eszopiclone 3 mg tablet	30	30	BeylinsonMartin DO	[id number]	Medicare	[facility] [id number]     ADVANCE DIRECTIVES:     [x ] Full Code [ ] DNR  MOLST  [ ]  Living Will  [ ]   DECISION MAKER(s):  [ ] Health Care Proxy(s)  [x ] Surrogate(s)  [ ] Guardian           Name(s): Phone Number(s):  oDnis Duque      BASELINE (I)ADL(s) (prior to admission):    [place]: [ ]Total  [ ] Moderate [ ]Dependent  Palliative Performance Status Version 2:         %    [email or web address]    Allergies    Bactrim (Unknown)    Intolerances    MEDICATIONS  (STANDING):  aMIOdarone    Tablet 200 milliGRAM(s) Oral daily  atorvastatin 40 milliGRAM(s) Oral at bedtime  buMETAnide Injectable 2 milliGRAM(s) IV Push <User Schedule>  chlorhexidine 2% Cloths 1 Application(s) Topical <User Schedule>  clopidogrel Tablet 75 milliGRAM(s) Oral daily  enoxaparin Injectable 80 milliGRAM(s) SubCutaneous every 12 hours  insulin glargine Injectable (LANTUS) 15 Unit(s) SubCutaneous at bedtime  insulin lispro (ADMELOG) corrective regimen sliding scale   SubCutaneous three times a day before meals  insulin lispro Injectable (ADMELOG) 5 Unit(s) SubCutaneous three times a day before meals  metoprolol succinate ER 25 milliGRAM(s) Oral daily  mupirocin 2% Nasal 1 Application(s) Both Nostrils every 12 hours  potassium chloride    Tablet ER 20 milliEquivalent(s) Oral once  sacubitril 24 mG/valsartan 26 mG 1 Tablet(s) Oral two times a day  spironolactone 25 milliGRAM(s) Oral every 24 hours    MEDICATIONS  (PRN):  acetaminophen     Tablet .. 650 milliGRAM(s) Oral every 6 hours PRN Temp greater or equal to 38C (100.4F), Mild Pain (1 - 3)  aluminum hydroxide/magnesium hydroxide/simethicone Suspension 30 milliLiter(s) Oral every 4 hours PRN Dyspepsia  guaiFENesin  milliGRAM(s) Oral every 12 hours PRN Cough  melatonin 5 milliGRAM(s) Oral at bedtime PRN Insomnia  ondansetron Injectable 4 milliGRAM(s) IV Push every 8 hours PRN Nausea and/or Vomiting  zolpidem 5 milliGRAM(s) Oral at bedtime PRN Insomnia    PRESENT SYMPTOMS: [x ]Unable to obtain due to poor mentation   Source if other than patient:  [ ]Family   [ ]Team     Pain: [ ]yes [ ]no  ALL PAIN ASSESSMENT COMPONENTS WERE ASKED ABOUT UNLESS OTHERWISE NOTED  QOL impact -   Location -                    Aggravating factors -  Quality -  Radiation -  Timing-  Severity (0-10 scale):  Minimal acceptable level (0-10 scale):     CPOT:  0  https://www.[facility].org/getattachment/[email or web address]/Critical-Care-Pain-Observation-Tool-(CPOT)    PAIN AD Score:   http://geriatrictoolkit.[facility]/cog/painad.pdf (press ctrl +  left click to view)    Dyspnea:                           [ ]None[ ]Mild [ ]Moderate [ ]Severe     Respiratory Distress Observation Scale (RDOS): 0  A score of 0 to 2 signifies little or no respiratory distress, 3 signifies mild distress, scores 4 to 6 indicate moderate distress, and scores greater than 7 signify severe distress  [email or web address]/sites/default/[email or web address]-qsgqweiajtq-hqmoabdw-[email or web address]-qnles.[email or web address]    Anxiety:                             [ ]None[ ]Mild [ ]Moderate [ ]Severe   Fatigue:                             [ ]None[ ]Mild [ ]Moderate [ ]Severe   Nausea:                             [ ]None[ ]Mild [ ]Moderate [ ]Severe   Loss of appetite:              [ ]None[ ]Mild [ ]Moderate [ ]Severe   Constipation:                    [ ]None[ ]Mild [ ]Moderate [ ]Severe    Other Symptoms:  [ ]All other review of systems negative     Palliative Performance Status Version 2:         20%    http://npcrc.org/files/news/palliative_performance_scale_ppsv2.pdf    PHYSICAL EXAM:  Vital Signs Last 24 Hrs  T(C): 37.2 (22 Apr 2025 12:09), Max: 37.2 (22 Apr 2025 12:09)  T(F): 99 (22 Apr 2025 12:09), Max: 99 (22 Apr 2025 12:09)  HR: 97 (22 Apr 2025 12:09) (84 - 99)  BP: 95/56 (22 Apr 2025 12:09) (95/56 - 174/94)  BP(mean): 98 (22 Apr 2025 05:23) (96 - 123)  RR: 18 (22 Apr 2025 12:09) (17 - 20)  SpO2: 97% (22 Apr 2025 12:09) (89% - 100%)    Parameters below as of 22 Apr 2025 05:29  Patient On (Oxygen Delivery Method): nasal cannula  O2 Flow (L/min): 4   I&O's Summary    21 Apr 2025 07:01  -  22 Apr 2025 07:00  --------------------------------------------------------  IN: 830 mL / OUT: 3350 mL / NET: -2520 mL    22 Apr 2025 07:01  -  22 Apr 2025 16:57  --------------------------------------------------------  IN: 0 mL / OUT: 1000 mL / NET: -1000 mL        GENERAL:  [ ] No acute distress [ ]Lethargic  [ ]Unarousable  [ x]Verbal  [ ]Non-Verbal [ ]Cachexia    BEHAVIORAL/PSYCH:  [x ]Alert and Oriented x0-1  [ ] Anxiety [ ] Delirium [ x] Agitation [ ] Calm   EYES: [ ] No scleral icterus [ ] Scleral icterus [ ] Closed  ENMT:  [ ]Dry mouth  [ x]No external oral lesions [ ] No external ear or nose lesions  CARDIOVASCULAR:  [ ]Regular [ ]Irregular [ ]Tachy [ ]Not Tachy  [ ]Aubrey [ ] Edema [ ] No edema  PULMONARY:  [ ]Tachypnea  [ ]Audible excessive secretions [x ] No labored breathing [ ] labored breathing  GASTROINTESTINAL: [ ]Soft  [ ]Distended  [ ]Not distended [ ]Non tender [ ]Tender  MUSCULOSKELETAL: [ ]No clubbing [ ] clubbing  [ ] No cyanosis [ ] cyanosis  NEUROLOGIC: [ ]No focal deficits  [ ]Follows commands  [x ]Does not follow commands  [ ]Cognitive impairment  [ ]Dysphagia  [ ]Dysarthria  [ ]Paresis   SKIN: [ ] Jaundiced [ ] Non-jaundiced [ ]Rash [ ]No Rash [ ] Warm [ ] Dry  MISC/LINES: [ ] ET tube [ ] Trach [ ]NGT/OGT [ ]PEG [ ]Mcmahon    LABS: reviewed by me                        11.9   16.41 )-----------( 404      ( 22 Apr 2025 07:13 )             36.5   04-22    140  |  101  |  20  ----------------------------<  262[H]  4.1   |  26  |  1.2    Ca    9.2      22 Apr 2025 07:13  Mg     2.3     04-22    TPro  6.2  /  Alb  3.5  /  TBili  0.5  /  DBili  x   /  AST  10  /  ALT  9   /  AlkPhos  95  04-22  PTT - ( 21 Apr 2025 11:36 )  PTT:141.6 sec    Urinalysis Basic - ( 22 Apr 2025 07:13 )    Color: x / Appearance: x / SG: x / pH: x  Gluc: 262 mg/dL / Ketone: x  / Bili: x / Urobili: x   Blood: x / Protein: x / Nitrite: x   Leuk Esterase: x / RBC: x / WBC x   Sq Epi: x / Non Sq Epi: x / Bacteria: x      RADIOLOGY & ADDITIONAL STUDIES: reviewed by me    < from: Xray Chest 1 View- PORTABLE-Routine (Xray Chest 1 View- PORTABLE-Routine in AM.) (04.22.25 @ 08:01) >  IMPRESSION:  1. Increased patchy bilateral opacities/pleural effusions.    < end of copied text >      EKG: reviewed by me    < from: 12 Lead ECG (04.20.25 @ 02:28) >  Ventricular Rate 103 BPM    Atrial Rate 103 BPM    QRS Duration 154 ms    Q-T Interval 458 ms    QTC Calculation(Bazett) 599 ms    R Axis 258 degrees    T Axis 70 degrees    Diagnosis Line AV dual-paced rhythm with premature ventricular or aberrantly conducted  complexes  Biventricular pacemaker detected  Abnormal ECG    < end of copied text >      PROTEIN CALORIE MALNUTRITION PRESENT: [ ]mild [ ]moderate [ ]severe [ ]underweight [ ]morbid obesity  https://www.andeal.org/vault/2440/web/files/ONC/Table_Clinical%20Characteristics%20to%20Document%20Malnutrition-White%20JV%20et%20al%312199.pdf    Height (cm): 160 (04-19-25 @ 12:50), 160 (01-16-25 @ 19:31), 160 (01-14-25 @ 08:15)  Weight (kg): 79.1 (04-19-25 @ 12:50), 71.2 (04-14-25 @ 14:45), 80.6 (03-28-25 @ 05:59)  BMI (kg/m2): 30.9 (04-19-25 @ 12:50), 27.8 (04-14-25 @ 14:45), 31.5 (03-28-25 @ 05:59)  [ ]PPSV2 < or = to 30% [ ]significant weight loss  [ ]poor nutritional intake  [ ]anasarca      [ ]Artificial Nutrition      Palliative Care Spiritual/Emotional Screening Tool Question  Severity (0-4):                    OR                    [ x] Unable to determine. Will assess at later time if appropriate.  Score of 2 or greater indicates recommendation of Chaplaincy and/or SW referral  Chaplaincy Referral: [ ] Yes [ ] Refused [ ] Following     Caregiver [facility]:  [ ] Yes [ ] No    OR    [x ] Unable to determine. Will assess at later time if appropriate.  Social Work Referral [ ]  Patient and Family Centered Care Referral [ ]    Anticipatory Grief Present: [ ] Yes [ ] No    OR     [ x] Unable to determine. Will assess at later time if appropriate.  Social Work Referral [ ]  Patient and Family Centered Care Referral [ ]    Patient discussed with primary medical team MD  Palliative care education provided to patient and/or family

## 2025-04-23 LAB
ALBUMIN SERPL ELPH-MCNC: 3.3 G/DL — LOW (ref 3.5–5.2)
ALP SERPL-CCNC: 93 U/L — SIGNIFICANT CHANGE UP (ref 30–115)
ALT FLD-CCNC: 8 U/L — SIGNIFICANT CHANGE UP (ref 0–41)
ANION GAP SERPL CALC-SCNC: 12 MMOL/L — SIGNIFICANT CHANGE UP (ref 7–14)
AST SERPL-CCNC: 8 U/L — SIGNIFICANT CHANGE UP (ref 0–41)
BASOPHILS # BLD AUTO: 0.09 K/UL — SIGNIFICANT CHANGE UP (ref 0–0.2)
BASOPHILS NFR BLD AUTO: 0.6 % — SIGNIFICANT CHANGE UP (ref 0–1)
BILIRUB SERPL-MCNC: 0.5 MG/DL — SIGNIFICANT CHANGE UP (ref 0.2–1.2)
BUN SERPL-MCNC: 25 MG/DL — HIGH (ref 10–20)
CALCIUM SERPL-MCNC: 9.4 MG/DL — SIGNIFICANT CHANGE UP (ref 8.4–10.5)
CHLORIDE SERPL-SCNC: 100 MMOL/L — SIGNIFICANT CHANGE UP (ref 98–110)
CO2 SERPL-SCNC: 29 MMOL/L — SIGNIFICANT CHANGE UP (ref 17–32)
CREAT SERPL-MCNC: 1.2 MG/DL — SIGNIFICANT CHANGE UP (ref 0.7–1.5)
EGFR: 65 ML/MIN/1.73M2 — SIGNIFICANT CHANGE UP
EGFR: 65 ML/MIN/1.73M2 — SIGNIFICANT CHANGE UP
EOSINOPHIL # BLD AUTO: 0.4 K/UL — SIGNIFICANT CHANGE UP (ref 0–0.7)
EOSINOPHIL NFR BLD AUTO: 2.7 % — SIGNIFICANT CHANGE UP (ref 0–8)
GLUCOSE SERPL-MCNC: 232 MG/DL — HIGH (ref 70–99)
HCT VFR BLD CALC: 37.6 % — LOW (ref 42–52)
HGB BLD-MCNC: 12 G/DL — LOW (ref 14–18)
IMM GRANULOCYTES NFR BLD AUTO: 0.5 % — HIGH (ref 0.1–0.3)
LYMPHOCYTES # BLD AUTO: 1.65 K/UL — SIGNIFICANT CHANGE UP (ref 1.2–3.4)
LYMPHOCYTES # BLD AUTO: 11.3 % — LOW (ref 20.5–51.1)
MAGNESIUM SERPL-MCNC: 1.9 MG/DL — SIGNIFICANT CHANGE UP (ref 1.8–2.4)
MCHC RBC-ENTMCNC: 27.6 PG — SIGNIFICANT CHANGE UP (ref 27–31)
MCHC RBC-ENTMCNC: 31.9 G/DL — LOW (ref 32–37)
MCV RBC AUTO: 86.4 FL — SIGNIFICANT CHANGE UP (ref 80–94)
MONOCYTES # BLD AUTO: 1.07 K/UL — HIGH (ref 0.1–0.6)
MONOCYTES NFR BLD AUTO: 7.3 % — SIGNIFICANT CHANGE UP (ref 1.7–9.3)
NEUTROPHILS # BLD AUTO: 11.31 K/UL — HIGH (ref 1.4–6.5)
NEUTROPHILS NFR BLD AUTO: 77.6 % — HIGH (ref 42.2–75.2)
NRBC BLD AUTO-RTO: 0 /100 WBCS — SIGNIFICANT CHANGE UP (ref 0–0)
PLATELET # BLD AUTO: 405 K/UL — HIGH (ref 130–400)
PMV BLD: 10.9 FL — HIGH (ref 7.4–10.4)
POTASSIUM SERPL-MCNC: 4.1 MMOL/L — SIGNIFICANT CHANGE UP (ref 3.5–5)
POTASSIUM SERPL-SCNC: 4.1 MMOL/L — SIGNIFICANT CHANGE UP (ref 3.5–5)
PROT SERPL-MCNC: 6.5 G/DL — SIGNIFICANT CHANGE UP (ref 6–8)
RBC # BLD: 4.35 M/UL — LOW (ref 4.7–6.1)
RBC # FLD: 13.9 % — SIGNIFICANT CHANGE UP (ref 11.5–14.5)
SODIUM SERPL-SCNC: 141 MMOL/L — SIGNIFICANT CHANGE UP (ref 135–146)
WBC # BLD: 14.59 K/UL — HIGH (ref 4.8–10.8)
WBC # FLD AUTO: 14.59 K/UL — HIGH (ref 4.8–10.8)

## 2025-04-23 PROCEDURE — 99233 SBSQ HOSP IP/OBS HIGH 50: CPT

## 2025-04-23 RX ORDER — INSULIN GLARGINE-YFGN 100 [IU]/ML
18 INJECTION, SOLUTION SUBCUTANEOUS AT BEDTIME
Refills: 0 | Status: DISCONTINUED | OUTPATIENT
Start: 2025-04-23 | End: 2025-04-25

## 2025-04-23 RX ORDER — INSULIN LISPRO 100 U/ML
8 INJECTION, SOLUTION INTRAVENOUS; SUBCUTANEOUS
Refills: 0 | Status: DISCONTINUED | OUTPATIENT
Start: 2025-04-23 | End: 2025-04-25

## 2025-04-23 RX ORDER — ACETAMINOPHEN 500 MG/5ML
975 LIQUID (ML) ORAL ONCE
Refills: 0 | Status: DISCONTINUED | OUTPATIENT
Start: 2025-04-23 | End: 2025-04-25

## 2025-04-23 RX ADMIN — INSULIN LISPRO 5 UNIT(S): 100 INJECTION, SOLUTION INTRAVENOUS; SUBCUTANEOUS at 08:18

## 2025-04-23 RX ADMIN — DEXTROMETHORPHAN HBR, GUAIFENESIN 600 MILLIGRAM(S): 200 LIQUID ORAL at 11:36

## 2025-04-23 RX ADMIN — ENOXAPARIN SODIUM 80 MILLIGRAM(S): 100 INJECTION SUBCUTANEOUS at 05:16

## 2025-04-23 RX ADMIN — ATORVASTATIN CALCIUM 40 MILLIGRAM(S): 80 TABLET, FILM COATED ORAL at 21:50

## 2025-04-23 RX ADMIN — Medication 1 APPLICATION(S): at 05:17

## 2025-04-23 RX ADMIN — CLOPIDOGREL BISULFATE 75 MILLIGRAM(S): 75 TABLET, FILM COATED ORAL at 11:36

## 2025-04-23 RX ADMIN — INSULIN LISPRO 2: 100 INJECTION, SOLUTION INTRAVENOUS; SUBCUTANEOUS at 17:20

## 2025-04-23 RX ADMIN — Medication 650 MILLIGRAM(S): at 21:50

## 2025-04-23 RX ADMIN — METOPROLOL SUCCINATE 25 MILLIGRAM(S): 50 TABLET, EXTENDED RELEASE ORAL at 05:16

## 2025-04-23 RX ADMIN — AMIODARONE HYDROCHLORIDE 200 MILLIGRAM(S): 50 INJECTION, SOLUTION INTRAVENOUS at 05:16

## 2025-04-23 RX ADMIN — ENOXAPARIN SODIUM 80 MILLIGRAM(S): 100 INJECTION SUBCUTANEOUS at 18:37

## 2025-04-23 RX ADMIN — BUMETANIDE 2 MILLIGRAM(S): 1 TABLET ORAL at 08:18

## 2025-04-23 RX ADMIN — INSULIN LISPRO 8 UNIT(S): 100 INJECTION, SOLUTION INTRAVENOUS; SUBCUTANEOUS at 17:20

## 2025-04-23 RX ADMIN — Medication 25 MILLIGRAM(S): at 05:16

## 2025-04-23 RX ADMIN — SACUBITRIL AND VALSARTAN 1 TABLET(S): 6; 6 PELLET ORAL at 18:37

## 2025-04-23 RX ADMIN — MUPIROCIN CALCIUM 1 APPLICATION(S): 20 CREAM TOPICAL at 05:16

## 2025-04-23 RX ADMIN — SACUBITRIL AND VALSARTAN 1 TABLET(S): 6; 6 PELLET ORAL at 05:16

## 2025-04-23 RX ADMIN — MUPIROCIN CALCIUM 1 APPLICATION(S): 20 CREAM TOPICAL at 18:37

## 2025-04-23 RX ADMIN — BUMETANIDE 2 MILLIGRAM(S): 1 TABLET ORAL at 21:54

## 2025-04-23 RX ADMIN — INSULIN LISPRO 3: 100 INJECTION, SOLUTION INTRAVENOUS; SUBCUTANEOUS at 08:17

## 2025-04-23 RX ADMIN — INSULIN GLARGINE-YFGN 18 UNIT(S): 100 INJECTION, SOLUTION SUBCUTANEOUS at 21:50

## 2025-04-23 NOTE — PROGRESS NOTE ADULT - SUBJECTIVE AND OBJECTIVE BOX
MONIQUE LYONS 70y Male  MRN#: [id number]     Hospital Day: 4d      SUBJECTIVE  No acute events overnight, pt seen and examined this morning.                                          ----------------------------------------------------------  OBJECTIVE  PAST MEDICAL & SURGICAL HISTORY  CHF (congestive heart failure)    Diabetes    CAD (coronary artery disease)    Chronic obstructive pulmonary disease (COPD)    HTN (hypertension)    Stented coronary artery    Dyslipidemia    GERD (gastroesophageal reflux disease)    Afib    CVA (cerebral vascular accident)    H/O heart artery stent    Heart valve replaced  aortic    History of Nissen fundoplication                                              -----------------------------------------------------------  ALLERGIES:  Bactrim (Unknown)                                            ------------------------------------------------------------    HOME MEDICATIONS  Home Medications:  acetaminophen 325 mg oral tablet: 2 tab(s) orally every 6 hours As needed Temp greater or equal to 38C (100.4F), Mild Pain (1 - 3) (14 Apr 2025 18:05)  atorvastatin 40 mg oral tablet: 1 tab(s) orally once a day (14 Apr 2025 18:05)  clopidogrel 75 mg oral tablet: 1 tab(s) orally once a day (14 Apr 2025 18:05)  eszopiclone 3 mg oral tablet: 1 tab(s) orally once a day (at bedtime) (14 Apr 2025 18:05)  ezetimibe 10 mg oral tablet: 1 orally once a day (14 Apr 2025 18:05)  fenofibrate 145 mg oral tablet: 1 orally once a day (14 Apr 2025 18:05)  metFORMIN 500 mg oral tablet: 1 tab(s) orally 2 times a day (14 Apr 2025 18:05)  Trulicity Pen 1.5 mg/0.5 mL subcutaneous solution: 1.5 milligram(s) subcutaneously once a week (14 Apr 2025 18:05)                           MEDICATIONS:  STANDING MEDICATIONS  aMIOdarone    Tablet 200 milliGRAM(s) Oral daily  atorvastatin 40 milliGRAM(s) Oral at bedtime  buMETAnide Injectable 2 milliGRAM(s) IV Push <User Schedule>  chlorhexidine 2% Cloths 1 Application(s) Topical <User Schedule>  clopidogrel Tablet 75 milliGRAM(s) Oral daily  enoxaparin Injectable 80 milliGRAM(s) SubCutaneous every 12 hours  insulin glargine Injectable (LANTUS) 18 Unit(s) SubCutaneous at bedtime  insulin lispro (ADMELOG) corrective regimen sliding scale   SubCutaneous three times a day before meals  insulin lispro Injectable (ADMELOG) 8 Unit(s) SubCutaneous three times a day before meals  metoprolol succinate ER 25 milliGRAM(s) Oral daily  mupirocin 2% Nasal 1 Application(s) Both Nostrils every 12 hours  sacubitril 24 mG/valsartan 26 mG 1 Tablet(s) Oral two times a day  spironolactone 25 milliGRAM(s) Oral every 24 hours    PRN MEDICATIONS  acetaminophen     Tablet .. 650 milliGRAM(s) Oral every 6 hours PRN  aluminum hydroxide/magnesium hydroxide/simethicone Suspension 30 milliLiter(s) Oral every 4 hours PRN  guaiFENesin  milliGRAM(s) Oral every 12 hours PRN  melatonin 5 milliGRAM(s) Oral at bedtime PRN  ondansetron Injectable 4 milliGRAM(s) IV Push every 8 hours PRN  zolpidem 5 milliGRAM(s) Oral at bedtime PRN                                            ------------------------------------------------------------  VITAL SIGNS: Last 24 Hours  T(C): 36.9 (23 Apr 2025 05:00), Max: 37.2 (22 Apr 2025 12:09)  T(F): 98.4 (23 Apr 2025 05:00), Max: 99 (22 Apr 2025 12:09)  HR: 95 (23 Apr 2025 08:21) (89 - 97)  BP: 121/65 (23 Apr 2025 08:21) (95/56 - 126/72)  BP(mean): 84 (23 Apr 2025 05:00) (84 - 90)  RR: 18 (23 Apr 2025 08:21) (18 - 18)  SpO2: 95% (23 Apr 2025 08:21) (95% - 98%)      04-22-25 @ 07:01  -  04-23-25 @ 07:00  --------------------------------------------------------  IN: 0 mL / OUT: 1001 mL / NET: -1001 mL    04-23-25 @ 07:01  -  04-23-25 @ 11:49  --------------------------------------------------------  IN: 120 mL / OUT: 800 mL / NET: -680 mL                                             --------------------------------------------------------------  LABS:                        12.0   14.59 )-----------( 405      ( 23 Apr 2025 04:54 )             37.6     04-23    141  |  100  |  25[H]  ----------------------------<  232[H]  4.1   |  29  |  1.2    Ca    9.4      23 Apr 2025 04:54  Mg     1.9     04-23    TPro  6.5  /  Alb  3.3[L]  /  TBili  0.5  /  DBili  x   /  AST  8   /  ALT  8   /  AlkPhos  93  04-23      Urinalysis Basic - ( 23 Apr 2025 04:54 )    Color: x / Appearance: x / SG: x / pH: x  Gluc: 232 mg/dL / Ketone: x  / Bili: x / Urobili: x   Blood: x / Protein: x / Nitrite: x   Leuk Esterase: x / RBC: x / WBC x   Sq Epi: x / Non Sq Epi: x / Bacteria: x                                                            -------------------------------------------------------------  RADIOLOGY:  CXR      CT                                            --------------------------------------------------------------    PHYSICAL EXAM:  GENERAL: NAD, lying in bed comfortably  CHEST/LUNG: crackles   HEART: Regular rate and rhythm; No murmurs, rubs, or gallops  ABDOMEN: Soft, nontender, nondistended  EXTREMITIES:  No clubbing, cyanosis, or edema  NERVOUS SYSTEM:  A&Ox3

## 2025-04-23 NOTE — PROGRESS NOTE ADULT - ASSESSMENT
69 y/o male with past medical history of HFrEF 43%, ischemic cardiomyopathy s/p CRT-D, AVR, hypertension, paroxysmal A-fib (status post ablation 1/14/25), CAD s/p PCI (2019), diabetes, hyperlipidemia, COPD (on 2L home O2), frequent  falls, presented to the ED with complaint of acute onset SOB and 10/10 chest pain since afternoon. As per EMS, the pt was having runs of VT. Upon ED presentation, the pt was noted to be in sinus tachycardia rhythm and with associated hypoxia. The pt was started on BiPAP and placed on a nitroglycerin drip. He reported improvement in symptoms which resolution of chest pain. Patient was admitted to  step down for hypertensive emergency with flush pulmonary edema and NSTEMI. Patient off nitro drip now and on entresto, metoprolol and spironolactone. BP well controlled for now.  repeat echo showed a drop from 43% to 30%. Patient seen by cardio and planning for cath when stable.    Of note the patient was intermittent angry, and wanted to leave against medical advice. While he was oriented, he did not understand the gravity of his medical condition. He stated that he will take a cab and go home despite realizing that he is short of breath and on oxygen. He was seen by psychiatry who agreed that he presently lacks capacity to make his decisions to leave AMA, and to defer to next of kin.    The patient had few runs of NSVT while on T. His mag and potassium were found to be low (he is on IV bumetanide). His BP remained stable and he remained without chest pain. Electrolyte repletion was ordered. Patient is stable to be downgraded to medicine-telemetry.    #ADHF with pulmonary edema  #Hypertensive emergency with flash pulmonary edema  #HFrEF  #NSTEMI  - BNP 3.6k, trop peak 166 >>162  - Lactate 6.4 >> 1.8  - CXR 4/18: Bilateral opacities, congested  - Echo 4/20 noted. EF 30%. drop from Jan 2025 when the LVEF was 43%.  - Off nitro drip since 4/20 AM, BP better controlled now.  - Continue with GDMT: entresto , spironolactone and metoprolol. Tolerating GDMT for now. Add SGLT2 and  Increase to highest tolerated dose when patient more stable  - BMP BID  - Daily wt, strict I/O Target -2L  - 4/22: Patient Tachypneic on 4L NC, bipap restarted  - Xray showing improved effusions.   - cw Bumex BID  - palliative consult: full code, cw med mgmt, son Neto is decision maker  - Plan to cath when patient more stable    #Agitation  - Patient intermittently agitated, pulling out IV line and screaming at staff.  - Patient insisting on leaving AMA, understands that it may result in death given his current medical state, he stated to let him die and that he "wants to get as close to his parents as possible". See progress note from 4/21.  - The above occurred while Valencia, spouse, was on the phone at patient's bedside. She does not want the patient to leave AMA.  - Psychiatry consulted, patient does not have capacity to leave against medical advice.    #SIRS present on admission - Less likely infection  - was started cefepime empirically.  - ID recs appreciated: Clinical picture not consistent with sepsis. DC abx.  - FBcNGT  - SARS Flu RSV negative.  - No UA done.    #Paroxysmal afib  #PPM  - Eliquis held in case ischemic workup is to be done inpatient for acute drop in ejection fracture.  - Heparin drip switched to enoxaparin given improvement in kidney function.  - Metoprolol succinate ER 25mg, amiodarone 200 QD.    #CAD  - C/w clopidogrel, atorvastatin.    #MISC  #DVT PPX: Enoxaparin  #GI PPX: Pantoprazole  #Diet: Dash   #Code Status: full  #Activity Order: IAT    #PENDING  - Plan to cath when patient more stable 69 y/o male with past medical history of HFrEF 43%, ischemic cardiomyopathy s/p CRT-D, AVR, hypertension, paroxysmal A-fib (status post ablation 1/14/25), CAD s/p PCI (2019), diabetes, hyperlipidemia, COPD (on 2L home O2), frequent  falls, presented to the ED with complaint of acute onset SOB and 10/10 chest pain since afternoon. As per EMS, the pt was having runs of VT. Upon ED presentation, the pt was noted to be in sinus tachycardia rhythm and with associated hypoxia. The pt was started on BiPAP and placed on a nitroglycerin drip. He reported improvement in symptoms which resolution of chest pain. Patient was admitted to  step down for hypertensive emergency with flush pulmonary edema and NSTEMI. Patient off nitro drip now and on entresto, metoprolol and spironolactone. BP well controlled for now.  repeat echo showed a drop from 43% to 30%. Patient seen by cardio and planning for cath when stable.    Of note the patient was intermittent angry, and wanted to leave against medical advice. While he was oriented, he did not understand the gravity of his medical condition. He stated that he will take a cab and go home despite realizing that he is short of breath and on oxygen. He was seen by psychiatry who agreed that he presently lacks capacity to make his decisions to leave AMA, and to defer to next of kin.    The patient had few runs of NSVT while on T. His mag and potassium were found to be low (he is on IV bumetanide). His BP remained stable and he remained without chest pain. Electrolyte repletion was ordered. Patient is stable to be downgraded to medicine-telemetry.    #ADHF with pulmonary edema  #Hypertensive emergency with flash pulmonary edema  #HFrEF  #NSTEMI  - BNP 3.6k, trop peak 166 >>162  - Lactate 6.4 >> 1.8  - CXR 4/18: Bilateral opacities, congested  - Echo 4/20 noted. EF 30%. drop from Jan 2025 when the LVEF was 43%.  - Off nitro drip since 4/20 AM, BP better controlled now.  - Continue with GDMT: entresto , spironolactone and metoprolol. Tolerating GDMT for now. Add SGLT2 and  Increase to highest tolerated dose when patient more stable  - BMP BID  - Daily wt, strict I/O Target -2L  - 4/22: Patient Tachypneic on 4L NC, bipap restarted  - Xray showing improved effusions.   - cw Bumex BID  - palliative consult: full code, cw med mgmt, son Neto is decision maker  - Plan to cath when patient more stable    #Agitation  - Patient intermittently agitated, pulling out IV line and screaming at staff.  - Patient insisting on leaving AMA, understands that it may result in death given his current medical state, he stated to let him die and that he "wants to get as close to his parents as possible". See progress note from 4/21.  - The above occurred while Valencia, spouse, was on the phone at patient's bedside. She does not want the patient to leave AMA.  - Psychiatry consulted, patient does not have capacity to leave against medical advice.    #SIRS present on admission - Less likely infection  - was started cefepime empirically.  - ID recs appreciated: Clinical picture not consistent with sepsis. DC abx.  - FBcNGT  - SARS Flu RSV negative.  - No UA done.    #Paroxysmal afib  #PPM  - Eliquis held in case ischemic workup is to be done inpatient for acute drop in ejection fracture.  - Heparin drip switched to enoxaparin given improvement in kidney function.  - Metoprolol succinate ER 25mg, amiodarone 200 QD.    #CAD  - C/w clopidogrel, atorvastatin.    #DM  - ISS  - monitor FS    #MISC  #DVT PPX: Enoxaparin  #GI PPX: Pantoprazole  #Diet: Dash   #Code Status: full  #Activity Order: IAT    #PENDING  - Plan to cath when patient more stable

## 2025-04-23 NOTE — PROGRESS NOTE ADULT - ASSESSMENT
69 yo M PMHx chronic HFrEF 43%, ischemic cardiomyopathy s/p CRT-D, AVR, HTN, paroxysmal A-fib (status post ablation 1/14/25), CAD s/p PCI (2019), diabetes, hyperlipidemia, COPD (on 2L home O2), frequent  falls, presented to the ED with complaint of acute onset SOB and 10/10 chest pain. As per EMR pt had VT while in ambulance. Upon ER arrival pt had sinus tachycardia rhythm and with associated hypoxia. The pt was started on BiPAP and placed on a nitroglycerin drip. He was admitted to 4T stepdown where he was weaned off nitroglycerin drip  but remained on 4L O2 via NC, still feeling shortness of breath.  Coures complicated by suspected depression and lack of capacity. Pt is frequently hospitalized and leaves AMA.    Chest pain  Likely due to acute hypertensive emergency causing flash pulmonary edema s/p nitro gtt   Acute systolic heart failure on moderately reduced chronic HF  Elevated troponin likely due to hypertensive emergency  - s/p nitroglycerin infusion, was in 4T  - BNP 3.6k  - trop peak 166 >>162  - Lactate 6.4 >> 1.8  - CXR 4/18: Bilateral opacities, congested.  - C/w IV Bumex 2mg BID-down 1L only per flowsheet  - coreg held, unclear why  - Echo 4/20 noted. EF 30%. This is a drop from Jan 2025 when the LVEF was 43%-per cardio no need for urgent ischemic w/u currently, though possibly when more euvolemic and if pt agreeable  - Daily wt, strict I/O  - spironolactone started on this admission    Agitation  - as per staff presvious days pt was  intermittently agitated, pulling out IV line and screaming at staff, was  insisting on leaving AMA.   - Psychiatry consulted, patient does not have capacity to leave against medical advice.  - pt remains on 1:1 constant observation    Chronic leukocytosis   SIRS present on admission   - clinically no infection  - stable off abx  - received empiric cefepime  -has chronic leukocytosis-needs outpt hematology     Paroxysmal afib  PPM  - Eliquis held in case ischemic workup is to be done inpatient for acute drop in ejection fracture.  - Heparin drip switched to enoxaparin given improvement in kidney function.  - Metoprolol succinate ER 25mg, amiodarone 200 QD.    CAD  - C/w clopidogrel, atorvastatin.    DVT px    #Progress Note Handoff:  Pending (specify): c/w Diuresis, wean o2, monitor mental status

## 2025-04-23 NOTE — PROGRESS NOTE ADULT - SUBJECTIVE AND OBJECTIVE BOX
SUBJECTIVE/OVERNIGHT EVENTS  Today is hospital day 4d. This morning patient was seen and examined at bedside, resting comfortably in bed. No acute or major events overnight.    MEDICATIONS  STANDING MEDICATIONS  aMIOdarone    Tablet 200 milliGRAM(s) Oral daily  atorvastatin 40 milliGRAM(s) Oral at bedtime  buMETAnide Injectable 2 milliGRAM(s) IV Push <User Schedule>  chlorhexidine 2% Cloths 1 Application(s) Topical <User Schedule>  clopidogrel Tablet 75 milliGRAM(s) Oral daily  enoxaparin Injectable 80 milliGRAM(s) SubCutaneous every 12 hours  insulin glargine Injectable (LANTUS) 15 Unit(s) SubCutaneous at bedtime  insulin lispro (ADMELOG) corrective regimen sliding scale   SubCutaneous three times a day before meals  insulin lispro Injectable (ADMELOG) 5 Unit(s) SubCutaneous three times a day before meals  metoprolol succinate ER 25 milliGRAM(s) Oral daily  mupirocin 2% Nasal 1 Application(s) Both Nostrils every 12 hours  sacubitril 24 mG/valsartan 26 mG 1 Tablet(s) Oral two times a day  spironolactone 25 milliGRAM(s) Oral every 24 hours    PRN MEDICATIONS  acetaminophen     Tablet .. 650 milliGRAM(s) Oral every 6 hours PRN  aluminum hydroxide/magnesium hydroxide/simethicone Suspension 30 milliLiter(s) Oral every 4 hours PRN  guaiFENesin  milliGRAM(s) Oral every 12 hours PRN  melatonin 5 milliGRAM(s) Oral at bedtime PRN  ondansetron Injectable 4 milliGRAM(s) IV Push every 8 hours PRN  zolpidem 5 milliGRAM(s) Oral at bedtime PRN    VITALS  T(F): 98.4 (04-23-25 @ 05:00), Max: 99 (04-22-25 @ 12:09)  HR: 95 (04-23-25 @ 08:21) (89 - 97)  BP: 121/65 (04-23-25 @ 08:21) (95/56 - 126/72)  RR: 18 (04-23-25 @ 08:21) (18 - 18)  SpO2: 95% (04-23-25 @ 08:21) (95% - 98%)  POCT Blood Glucose.: 284 mg/dL (04-23-25 @ 08:08)  POCT Blood Glucose.: 258 mg/dL (04-22-25 @ 21:38)  POCT Blood Glucose.: 139 mg/dL (04-22-25 @ 16:22)  POCT Blood Glucose.: 156 mg/dL (04-22-25 @ 11:04)    PHYSICAL EXAM  GENERAL: NAD, lying in bed comfortably  HEAD:  Atraumatic, normocephalic  EYES: EOMI, conjunctiva and sclera clear  NECK: Supple, no JVD  HEART: Regular rate and rhythm, no murmurs, rubs, or gallops  LUNGS: Unlabored respirations.  Clear to auscultation bilaterally, no crackles, wheezing, or rhonchi  ABDOMEN: Soft, nontender, nondistended, +BS  EXTREMITIES: 2+ peripheral pulses bilaterally. No edema  NERVOUS SYSTEM:  A&Ox2, no focal deficits   SKIN: No rashes or lesions    LABS             12.0   14.59 )-----------( 405      ( 04-23-25 @ 04:54 )             37.6     141  |  100  |  25  -------------------------<  232   04-23-25 @ 04:54  4.1  |  29  |  1.2    Ca      9.4     04-23-25 @ 04:54  Mg     1.9     04-23-25 @ 04:54    TPro  6.5  /  Alb  3.3  /  TBili  0.5  /  DBili  x   /  AST  8   /  ALT  8   /  AlkPhos  93  /  GGT  x     04-23-25 @ 04:54    PTT - ( 04-21-25 @ 11:36 )  PTT:141.6 sec      Urinalysis Basic - ( 23 Apr 2025 04:54 )    Color: x / Appearance: x / SG: x / pH: x  Gluc: 232 mg/dL / Ketone: x  / Bili: x / Urobili: x   Blood: x / Protein: x / Nitrite: x   Leuk Esterase: x / RBC: x / WBC x   Sq Epi: x / Non Sq Epi: x / Bacteria: x          IMAGING SUBJECTIVE/OVERNIGHT EVENTS  Today is hospital day 4d. This morning patient was seen and examined at bedside, resting comfortably in bed. No acute or major events overnight.    MEDICATIONS  STANDING MEDICATIONS  aMIOdarone    Tablet 200 milliGRAM(s) Oral daily  atorvastatin 40 milliGRAM(s) Oral at bedtime  buMETAnide Injectable 2 milliGRAM(s) IV Push <User Schedule>  chlorhexidine 2% Cloths 1 Application(s) Topical <User Schedule>  clopidogrel Tablet 75 milliGRAM(s) Oral daily  enoxaparin Injectable 80 milliGRAM(s) SubCutaneous every 12 hours  insulin glargine Injectable (LANTUS) 15 Unit(s) SubCutaneous at bedtime  insulin lispro (ADMELOG) corrective regimen sliding scale   SubCutaneous three times a day before meals  insulin lispro Injectable (ADMELOG) 5 Unit(s) SubCutaneous three times a day before meals  metoprolol succinate ER 25 milliGRAM(s) Oral daily  mupirocin 2% Nasal 1 Application(s) Both Nostrils every 12 hours  sacubitril 24 mG/valsartan 26 mG 1 Tablet(s) Oral two times a day  spironolactone 25 milliGRAM(s) Oral every 24 hours    PRN MEDICATIONS  acetaminophen     Tablet .. 650 milliGRAM(s) Oral every 6 hours PRN  aluminum hydroxide/magnesium hydroxide/simethicone Suspension 30 milliLiter(s) Oral every 4 hours PRN  guaiFENesin  milliGRAM(s) Oral every 12 hours PRN  melatonin 5 milliGRAM(s) Oral at bedtime PRN  ondansetron Injectable 4 milliGRAM(s) IV Push every 8 hours PRN  zolpidem 5 milliGRAM(s) Oral at bedtime PRN    VITALS  T(F): 98.4 (04-23-25 @ 05:00), Max: 99 (04-22-25 @ 12:09)  HR: 95 (04-23-25 @ 08:21) (89 - 97)  BP: 121/65 (04-23-25 @ 08:21) (95/56 - 126/72)  RR: 18 (04-23-25 @ 08:21) (18 - 18)  SpO2: 95% (04-23-25 @ 08:21) (95% - 98%)  POCT Blood Glucose.: 284 mg/dL (04-23-25 @ 08:08)  POCT Blood Glucose.: 258 mg/dL (04-22-25 @ 21:38)  POCT Blood Glucose.: 139 mg/dL (04-22-25 @ 16:22)  POCT Blood Glucose.: 156 mg/dL (04-22-25 @ 11:04)    PHYSICAL EXAM  GENERAL: NAD, lying in bed comfortably  HEAD:  Atraumatic, normocephalic  EYES: EOMI, conjunctiva and sclera clear  NECK: Supple, no JVD  HEART: Regular rate and rhythm, no murmurs, rubs, or gallops  LUNGS: Unlabored respirations.  Clear to auscultation bilaterally, no crackles, wheezing, or rhonchi  ABDOMEN: Soft, nontender, nondistended, +BS  EXTREMITIES: 2+ peripheral pulses bilaterally. No edema  NERVOUS SYSTEM:  A&Ox2, no focal deficits   SKIN: No rashes or lesions    LABS             12.0   14.59 )-----------( 405      ( 04-23-25 @ 04:54 )             37.6     141  |  100  |  25  -------------------------<  232   04-23-25 @ 04:54  4.1  |  29  |  1.2    Ca      9.4     04-23-25 @ 04:54  Mg     1.9     04-23-25 @ 04:54    TPro  6.5  /  Alb  3.3  /  TBili  0.5  /  DBili  x   /  AST  8   /  ALT  8   /  AlkPhos  93  /  GGT  x     04-23-25 @ 04:54    PTT - ( 04-21-25 @ 11:36 )  PTT:141.6 sec      Urinalysis Basic - ( 23 Apr 2025 04:54 )    Color: x / Appearance: x / SG: x / pH: x  Gluc: 232 mg/dL / Ketone: x  / Bili: x / Urobili: x   Blood: x / Protein: x / Nitrite: x   Leuk Esterase: x / RBC: x / WBC x   Sq Epi: x / Non Sq Epi: x / Bacteria: x

## 2025-04-24 LAB
ALBUMIN SERPL ELPH-MCNC: 3.4 G/DL — LOW (ref 3.5–5.2)
ALP SERPL-CCNC: 89 U/L — SIGNIFICANT CHANGE UP (ref 30–115)
ALT FLD-CCNC: 9 U/L — SIGNIFICANT CHANGE UP (ref 0–41)
ANION GAP SERPL CALC-SCNC: 11 MMOL/L — SIGNIFICANT CHANGE UP (ref 7–14)
AST SERPL-CCNC: 12 U/L — SIGNIFICANT CHANGE UP (ref 0–41)
BASOPHILS # BLD AUTO: 0.07 K/UL — SIGNIFICANT CHANGE UP (ref 0–0.2)
BASOPHILS NFR BLD AUTO: 0.4 % — SIGNIFICANT CHANGE UP (ref 0–1)
BILIRUB SERPL-MCNC: 0.4 MG/DL — SIGNIFICANT CHANGE UP (ref 0.2–1.2)
BUN SERPL-MCNC: 25 MG/DL — HIGH (ref 10–20)
CALCIUM SERPL-MCNC: 9.4 MG/DL — SIGNIFICANT CHANGE UP (ref 8.4–10.5)
CHLORIDE SERPL-SCNC: 99 MMOL/L — SIGNIFICANT CHANGE UP (ref 98–110)
CO2 SERPL-SCNC: 29 MMOL/L — SIGNIFICANT CHANGE UP (ref 17–32)
CREAT SERPL-MCNC: 1.1 MG/DL — SIGNIFICANT CHANGE UP (ref 0.7–1.5)
CULTURE RESULTS: SIGNIFICANT CHANGE UP
CULTURE RESULTS: SIGNIFICANT CHANGE UP
EGFR: 72 ML/MIN/1.73M2 — SIGNIFICANT CHANGE UP
EGFR: 72 ML/MIN/1.73M2 — SIGNIFICANT CHANGE UP
EOSINOPHIL # BLD AUTO: 0.24 K/UL — SIGNIFICANT CHANGE UP (ref 0–0.7)
EOSINOPHIL NFR BLD AUTO: 1.5 % — SIGNIFICANT CHANGE UP (ref 0–8)
GLUCOSE BLDC GLUCOMTR-MCNC: 129 MG/DL — HIGH (ref 70–99)
GLUCOSE BLDC GLUCOMTR-MCNC: 191 MG/DL — HIGH (ref 70–99)
GLUCOSE BLDC GLUCOMTR-MCNC: 212 MG/DL — HIGH (ref 70–99)
GLUCOSE BLDC GLUCOMTR-MCNC: 213 MG/DL — HIGH (ref 70–99)
GLUCOSE SERPL-MCNC: 224 MG/DL — HIGH (ref 70–99)
HCT VFR BLD CALC: 38.3 % — LOW (ref 42–52)
HGB BLD-MCNC: 12.4 G/DL — LOW (ref 14–18)
IMM GRANULOCYTES NFR BLD AUTO: 0.6 % — HIGH (ref 0.1–0.3)
LYMPHOCYTES # BLD AUTO: 1.32 K/UL — SIGNIFICANT CHANGE UP (ref 1.2–3.4)
LYMPHOCYTES # BLD AUTO: 8.5 % — LOW (ref 20.5–51.1)
MAGNESIUM SERPL-MCNC: 1.7 MG/DL — LOW (ref 1.8–2.4)
MCHC RBC-ENTMCNC: 27.1 PG — SIGNIFICANT CHANGE UP (ref 27–31)
MCHC RBC-ENTMCNC: 32.4 G/DL — SIGNIFICANT CHANGE UP (ref 32–37)
MCV RBC AUTO: 83.8 FL — SIGNIFICANT CHANGE UP (ref 80–94)
MONOCYTES # BLD AUTO: 1.01 K/UL — HIGH (ref 0.1–0.6)
MONOCYTES NFR BLD AUTO: 6.5 % — SIGNIFICANT CHANGE UP (ref 1.7–9.3)
NEUTROPHILS # BLD AUTO: 12.85 K/UL — HIGH (ref 1.4–6.5)
NEUTROPHILS NFR BLD AUTO: 82.5 % — HIGH (ref 42.2–75.2)
NRBC BLD AUTO-RTO: 0 /100 WBCS — SIGNIFICANT CHANGE UP (ref 0–0)
PHOSPHATE SERPL-MCNC: 3.4 MG/DL — SIGNIFICANT CHANGE UP (ref 2.1–4.9)
PLATELET # BLD AUTO: 394 K/UL — SIGNIFICANT CHANGE UP (ref 130–400)
PMV BLD: 10.6 FL — HIGH (ref 7.4–10.4)
POTASSIUM SERPL-MCNC: 3.8 MMOL/L — SIGNIFICANT CHANGE UP (ref 3.5–5)
POTASSIUM SERPL-SCNC: 3.8 MMOL/L — SIGNIFICANT CHANGE UP (ref 3.5–5)
PROT SERPL-MCNC: 6.6 G/DL — SIGNIFICANT CHANGE UP (ref 6–8)
RBC # BLD: 4.57 M/UL — LOW (ref 4.7–6.1)
RBC # FLD: 13.6 % — SIGNIFICANT CHANGE UP (ref 11.5–14.5)
SODIUM SERPL-SCNC: 139 MMOL/L — SIGNIFICANT CHANGE UP (ref 135–146)
SPECIMEN SOURCE: SIGNIFICANT CHANGE UP
SPECIMEN SOURCE: SIGNIFICANT CHANGE UP
WBC # BLD: 15.58 K/UL — HIGH (ref 4.8–10.8)
WBC # FLD AUTO: 15.58 K/UL — HIGH (ref 4.8–10.8)

## 2025-04-24 PROCEDURE — 99233 SBSQ HOSP IP/OBS HIGH 50: CPT

## 2025-04-24 PROCEDURE — 71045 X-RAY EXAM CHEST 1 VIEW: CPT | Mod: 26

## 2025-04-24 RX ORDER — MAGNESIUM SULFATE 500 MG/ML
2 SYRINGE (ML) INJECTION ONCE
Refills: 0 | Status: COMPLETED | OUTPATIENT
Start: 2025-04-24 | End: 2025-04-24

## 2025-04-24 RX ORDER — TRAZODONE HCL 100 MG
75 TABLET ORAL ONCE
Refills: 0 | Status: COMPLETED | OUTPATIENT
Start: 2025-04-24 | End: 2025-04-24

## 2025-04-24 RX ADMIN — ENOXAPARIN SODIUM 80 MILLIGRAM(S): 100 INJECTION SUBCUTANEOUS at 17:46

## 2025-04-24 RX ADMIN — Medication 25 GRAM(S): at 16:07

## 2025-04-24 RX ADMIN — INSULIN GLARGINE-YFGN 18 UNIT(S): 100 INJECTION, SOLUTION SUBCUTANEOUS at 21:20

## 2025-04-24 RX ADMIN — INSULIN LISPRO 2: 100 INJECTION, SOLUTION INTRAVENOUS; SUBCUTANEOUS at 12:12

## 2025-04-24 RX ADMIN — INSULIN LISPRO 8 UNIT(S): 100 INJECTION, SOLUTION INTRAVENOUS; SUBCUTANEOUS at 17:20

## 2025-04-24 RX ADMIN — Medication 75 MILLIGRAM(S): at 00:35

## 2025-04-24 RX ADMIN — INSULIN LISPRO 1: 100 INJECTION, SOLUTION INTRAVENOUS; SUBCUTANEOUS at 08:27

## 2025-04-24 RX ADMIN — CLOPIDOGREL BISULFATE 75 MILLIGRAM(S): 75 TABLET, FILM COATED ORAL at 12:13

## 2025-04-24 RX ADMIN — SACUBITRIL AND VALSARTAN 1 TABLET(S): 6; 6 PELLET ORAL at 17:47

## 2025-04-24 RX ADMIN — BUMETANIDE 2 MILLIGRAM(S): 1 TABLET ORAL at 21:20

## 2025-04-24 RX ADMIN — ATORVASTATIN CALCIUM 40 MILLIGRAM(S): 80 TABLET, FILM COATED ORAL at 21:20

## 2025-04-24 RX ADMIN — BUMETANIDE 2 MILLIGRAM(S): 1 TABLET ORAL at 08:28

## 2025-04-24 RX ADMIN — INSULIN LISPRO 8 UNIT(S): 100 INJECTION, SOLUTION INTRAVENOUS; SUBCUTANEOUS at 08:27

## 2025-04-24 RX ADMIN — MUPIROCIN CALCIUM 1 APPLICATION(S): 20 CREAM TOPICAL at 17:46

## 2025-04-24 RX ADMIN — INSULIN LISPRO 8 UNIT(S): 100 INJECTION, SOLUTION INTRAVENOUS; SUBCUTANEOUS at 12:13

## 2025-04-24 NOTE — PROGRESS NOTE ADULT - ASSESSMENT
70 year old male pmhx afib on eliquis with aicd, cad, chf, dm, hld, htn presented to the ED with complaint of acute onset SOB and 10/10 chest pain since yesterday afternoon.  As per EMS, the pt was having runs of VT. Upon ED presentation, the pt was noted to be in sinus tachycardia rhythm and with associated hypoxia. The pt was started on BiPAP and placed on a nitroglycerin drip.  70 year old male PMH of A-fib on Eliquis with AICD, CAD, CHF, DM-2, DL, HTN presented to the ED with complaint of acute onset SOB and 10/10 chest pain for one day.  As per EMS, the pt was having runs of VT. Upon ED presentation, the pt was noted to be in sinus tachycardia rhythm and with associated hypoxia. The pt was started on BiPAP and placed on a nitroglycerin drip.       Hypertensive Emergency  Acute flash pulmonary edema / Acute HFrEF  CP likely due to #1  PAF  Chronic Leukocytosis  CAD  DM-2 / HTN / DL  Acute hypoxic respiratory failure, resolved.                 PLAN:    ·	Tele reviewed by me. On and off pace rhythm  ·	ECHO reviewed. EF is 30%. Mod TR. Bioprosthetic MV. Mod pulmonary HTN  ·	Cardiology f/u  ·	Troponin noted. Elevated. Likely due to NSTEMI type 2 due to hypertensive emergency  ·	CXR noted. Decreasing b/l opacities.   ·	Pt is close to euvolemic. Cont Bumex 2 mg iv q 12h. Switch him to Torsemide 20 mg po daily in AM  ·	Check i's and o's and daily wt  ·	Low salt diet and water restriction to 1.5 L/D  ·	GDMT: Cont Metoprolol Succinate 25 mg po daily, Entresto and Spironolactone.   ·	Add Dapagliflozin in AM  ·	If no ischemia w/u by the cardiology, can restart his Eliquis 5 mg po q 12h  ·	Out pt Hem/Onc f/u for chronic leukocytosis  ·	Monitor FS. On Lantus and Lispro  ·	Cont his other meds.   ·	PT eval    Progress Note Handoff    Pending (specify):  Consults_PT, Cardiology________, Tests________, Test Results_______, Other_________  Family discussion:  Disposition: Home___/SNF___/Other________/Unknown at this time________    Jose Gallo MD  Spectra: [phone]

## 2025-04-24 NOTE — PROGRESS NOTE ADULT - ASSESSMENT
70-year-old male past medical history ischemic cardiomyopathy, HFrEF, hypertension, hyperlipidemia, diabetes presenting with ADHF/chest pain.    Impression  HFrEF decompensation (EF 30%, s/p CRT-D)  CAD s/p PCI to RCA and LAD  HTN emergency  HTN/HLD/DM  AFib  COPD    Plan:  - Patient responding well to diuresis with 2L net negative   - Continue bumex 2 mg IV BID   - Repeat CXR in AM  - Continue amio 200 QD, entresto 24/26 BID, spironolactone 25 mg QD, Lovenox 80 mg BID and plavix  - Can switch to eliquis and add SGLT-2 in on discharge  - Discussed with Dr. Bryant - no need for cardiac cath during this stay  - Once euvolemic, can change to oral torsemide 20 mg on discharge   70-year-old male past medical history ischemic cardiomyopathy, HFrEF, hypertension, hyperlipidemia, diabetes presenting with ADHF/chest pain.    Impression  HFrEF decompensation (EF 30%, s/p CRT-D)  CAD s/p PCI to RCA and LAD  HTN emergency  HTN/HLD/DM  AFib  COPD    Plan:  - Patient responding well to diuresis with 2L net negative   - Continue bumex 2 mg IV BID   - Repeat CXR in AM  - Continue amio 200 QD, entresto 24/26 BID, spironolactone 25 mg QD, Lovenox 80 mg BID and plavix  - Can switch to eliquis and add SGLT-2 in on discharge  - There is no need for cardiac catheterization inpatient; this can be discussed outpatient   - Once euvolemic, can change to oral torsemide 20 mg on discharge

## 2025-04-24 NOTE — PROGRESS NOTE ADULT - SUBJECTIVE AND OBJECTIVE BOX
TOSINJAMEEMONIQUE CHRISTINE  70y Male    CHIEF COMPLAINT:    Patient is a 70y old  Male who presents with a chief complaint of Tachycardia and Pulmonary Edema (19 Apr 2025 08:46)      INTERVAL HPI/OVERNIGHT EVENTS:    Patient seen and examined.    ROS: All other systems are negative.    Vital Signs:    T(F): 97.9 (04-23-25 @ 20:10), Max: 98.1 (04-23-25 @ 13:06)  HR: 96 (04-23-25 @ 20:10) (95 - 99)  BP: 119/78 (04-23-25 @ 20:10) (119/78 - 126/78)  RR: 18 (04-23-25 @ 20:40) (18 - 18)  SpO2: 96% (04-23-25 @ 20:40) (95% - 99%)  I&O's Summary    23 Apr 2025 07:01  -  24 Apr 2025 07:00  --------------------------------------------------------  IN: 360 mL / OUT: 2400 mL / NET: -2040 mL      Daily     Daily   CAPILLARY BLOOD GLUCOSE      POCT Blood Glucose.: 259 mg/dL (23 Apr 2025 21:04)  POCT Blood Glucose.: 207 mg/dL (23 Apr 2025 16:39)  POCT Blood Glucose.: 284 mg/dL (23 Apr 2025 08:08)      PHYSICAL EXAM:    GENERAL:  NAD  SKIN: No rashes or lesions  HENT: Atraumatic. Normocephalic. PERRL. Moist membranes.  NECK: Supple, No JVD. No lymphadenopathy.  PULMONARY: CTA B/L. No wheezing. No rales  CVS: Normal S1, S2. Rate and Rhythm are regular. No murmurs.  ABDOMEN/GI: Soft, Nontender, Nondistended; BS present  EXTREMITIES: Peripheral pulses intact. No edema B/L LE.  NEUROLOGIC:  No motor or sensory deficit.  PSYCH: Alert & oriented x 3    Consultant(s) Notes Reviewed:  [x ] YES  [ ] NO  Care Discussed with Consultants/Other Providers [ x] YES  [ ] NO    EKG reviewed  Telemetry reviewed    LABS:                        12.0   14.59 )-----------( 405      ( 23 Apr 2025 04:54 )             37.6     04-23    141  |  100  |  25[H]  ----------------------------<  232[H]  4.1   |  29  |  1.2    Ca    9.4      23 Apr 2025 04:54  Mg     1.9     04-23    TPro  6.5  /  Alb  3.3[L]  /  TBili  0.5  /  DBili  x   /  AST  8   /  ALT  8   /  AlkPhos  93  04-23              RADIOLOGY & ADDITIONAL TESTS:    < from: TTE Echo Complete w/o Contrast w/ Doppler (04.20.25 @ 13:14) >  Summary:   1. LV Ejection Fraction by Beck's Method with a biplane EF of 30 %.   2. Severely decreased global left ventricular systolic function.   3. Endocardial visualization was enhanced with intravenous echo contrast.   4. Left atrial enlargement.   5. Mildly reduced RV systolic function.   6. Mild mitral valve regurgitation.   7. Thickening of the anterior and posterior mitral valve leaflets.   8. Moderate tricuspid regurgitation.   9. Bioprosthetic aortic valve in place. Peak/mean ykgstqse50.9/15.3   mmHg.  10. Estimated pulmonary artery systolic pressure is 59.9 mmHg assuming a   right atrial pressure of 3 mmHg, which is consistent with moderate   pulmonary hypertension.  11. Likely PFO noted by color doppler wtih left to right shunt.    < end of copied text >    Imaging or report Personally Reviewed:  [x ] YES  [ ] NO    Medications:  Standing  aMIOdarone    Tablet 200 milliGRAM(s) Oral daily  atorvastatin 40 milliGRAM(s) Oral at bedtime  buMETAnide Injectable 2 milliGRAM(s) IV Push <User Schedule>  chlorhexidine 2% Cloths 1 Application(s) Topical <User Schedule>  clopidogrel Tablet 75 milliGRAM(s) Oral daily  enoxaparin Injectable 80 milliGRAM(s) SubCutaneous every 12 hours  insulin glargine Injectable (LANTUS) 18 Unit(s) SubCutaneous at bedtime  insulin lispro (ADMELOG) corrective regimen sliding scale   SubCutaneous three times a day before meals  insulin lispro Injectable (ADMELOG) 8 Unit(s) SubCutaneous three times a day before meals  metoprolol succinate ER 25 milliGRAM(s) Oral daily  mupirocin 2% Nasal 1 Application(s) Both Nostrils every 12 hours  sacubitril 24 mG/valsartan 26 mG 1 Tablet(s) Oral two times a day  spironolactone 25 milliGRAM(s) Oral every 24 hours    PRN Meds  acetaminophen     Tablet .. 650 milliGRAM(s) Oral every 6 hours PRN  acetaminophen     Tablet .. 975 milliGRAM(s) Oral once PRN  aluminum hydroxide/magnesium hydroxide/simethicone Suspension 30 milliLiter(s) Oral every 4 hours PRN  guaiFENesin  milliGRAM(s) Oral every 12 hours PRN  melatonin 5 milliGRAM(s) Oral at bedtime PRN  ondansetron Injectable 4 milliGRAM(s) IV Push every 8 hours PRN  zolpidem 5 milliGRAM(s) Oral at bedtime PRN      Case discussed with resident    Care discussed with pt/family           TOSINJAMEEMONIQUE CHRISTINE  70y Male    CHIEF COMPLAINT:    Patient is a 70y old  Male who presents with a chief complaint of Tachycardia and Pulmonary Edema (19 Apr 2025 08:46)      INTERVAL HPI/OVERNIGHT EVENTS:    Patient seen and examined. Denies sob. Feels better. No cp    ROS: All other systems are negative.    Vital Signs:    T(F): 97.9 (04-23-25 @ 20:10), Max: 98.1 (04-23-25 @ 13:06)  HR: 96 (04-23-25 @ 20:10) (95 - 99)  BP: 119/78 (04-23-25 @ 20:10) (119/78 - 126/78)  RR: 18 (04-23-25 @ 20:40) (18 - 18)  SpO2: 96% (04-23-25 @ 20:40) (95% - 99%)  I&O's Summary    23 Apr 2025 07:01  -  24 Apr 2025 07:00  --------------------------------------------------------  IN: 360 mL / OUT: 2400 mL / NET: -2040 mL      Daily     Daily   CAPILLARY BLOOD GLUCOSE      POCT Blood Glucose.: 259 mg/dL (23 Apr 2025 21:04)  POCT Blood Glucose.: 207 mg/dL (23 Apr 2025 16:39)  POCT Blood Glucose.: 284 mg/dL (23 Apr 2025 08:08)      PHYSICAL EXAM:    GENERAL:  NAD  SKIN: No rashes or lesions  HENT: Atraumatic. Normocephalic. PERRL. Moist membranes.  NECK: Supple, No JVD. No lymphadenopathy.  PULMONARY: CTA B/L. No wheezing. No rales  CVS: Normal S1, S2. Rate and Rhythm are regular. No murmurs.  ABDOMEN/GI: Soft, Nontender, Nondistended; BS present  EXTREMITIES: Peripheral pulses intact. No edema B/L LE.  NEUROLOGIC:  No motor or sensory deficit.  PSYCH: Alert & oriented x 3    Consultant(s) Notes Reviewed:  [x ] YES  [ ] NO  Care Discussed with Consultants/Other Providers [ x] YES  [ ] NO    EKG reviewed  Telemetry reviewed    LABS:                        12.0   14.59 )-----------( 405      ( 23 Apr 2025 04:54 )             37.6     04-23    141  |  100  |  25[H]  ----------------------------<  232[H]  4.1   |  29  |  1.2    Ca    9.4      23 Apr 2025 04:54  Mg     1.9     04-23    TPro  6.5  /  Alb  3.3[L]  /  TBili  0.5  /  DBili  x   /  AST  8   /  ALT  8   /  AlkPhos  93  04-23              RADIOLOGY & ADDITIONAL TESTS:    < from: TTE Echo Complete w/o Contrast w/ Doppler (04.20.25 @ 13:14) >  Summary:   1. LV Ejection Fraction by Beck's Method with a biplane EF of 30 %.   2. Severely decreased global left ventricular systolic function.   3. Endocardial visualization was enhanced with intravenous echo contrast.   4. Left atrial enlargement.   5. Mildly reduced RV systolic function.   6. Mild mitral valve regurgitation.   7. Thickening of the anterior and posterior mitral valve leaflets.   8. Moderate tricuspid regurgitation.   9. Bioprosthetic aortic valve in place. Peak/mean zepzmcin71.9/15.3   mmHg.  10. Estimated pulmonary artery systolic pressure is 59.9 mmHg assuming a   right atrial pressure of 3 mmHg, which is consistent with moderate   pulmonary hypertension.  11. Likely PFO noted by color doppler wtih left to right shunt.    < end of copied text >    Imaging or report Personally Reviewed:  [x ] YES  [ ] NO    Medications:  Standing  aMIOdarone    Tablet 200 milliGRAM(s) Oral daily  atorvastatin 40 milliGRAM(s) Oral at bedtime  buMETAnide Injectable 2 milliGRAM(s) IV Push <User Schedule>  chlorhexidine 2% Cloths 1 Application(s) Topical <User Schedule>  clopidogrel Tablet 75 milliGRAM(s) Oral daily  enoxaparin Injectable 80 milliGRAM(s) SubCutaneous every 12 hours  insulin glargine Injectable (LANTUS) 18 Unit(s) SubCutaneous at bedtime  insulin lispro (ADMELOG) corrective regimen sliding scale   SubCutaneous three times a day before meals  insulin lispro Injectable (ADMELOG) 8 Unit(s) SubCutaneous three times a day before meals  metoprolol succinate ER 25 milliGRAM(s) Oral daily  mupirocin 2% Nasal 1 Application(s) Both Nostrils every 12 hours  sacubitril 24 mG/valsartan 26 mG 1 Tablet(s) Oral two times a day  spironolactone 25 milliGRAM(s) Oral every 24 hours    PRN Meds  acetaminophen     Tablet .. 650 milliGRAM(s) Oral every 6 hours PRN  acetaminophen     Tablet .. 975 milliGRAM(s) Oral once PRN  aluminum hydroxide/magnesium hydroxide/simethicone Suspension 30 milliLiter(s) Oral every 4 hours PRN  guaiFENesin  milliGRAM(s) Oral every 12 hours PRN  melatonin 5 milliGRAM(s) Oral at bedtime PRN  ondansetron Injectable 4 milliGRAM(s) IV Push every 8 hours PRN  zolpidem 5 milliGRAM(s) Oral at bedtime PRN      Case discussed with resident    Care discussed with pt/family

## 2025-04-24 NOTE — PROGRESS NOTE ADULT - SUBJECTIVE AND OBJECTIVE BOX
SUBJECTIVE/OVERNIGHT EVENTS  Today is hospital day 5d. This morning patient was seen and examined at bedside, resting comfortably in bed. No acute or major events overnight.    MEDICATIONS  STANDING MEDICATIONS  aMIOdarone    Tablet 200 milliGRAM(s) Oral daily  atorvastatin 40 milliGRAM(s) Oral at bedtime  buMETAnide Injectable 2 milliGRAM(s) IV Push <User Schedule>  chlorhexidine 2% Cloths 1 Application(s) Topical <User Schedule>  clopidogrel Tablet 75 milliGRAM(s) Oral daily  enoxaparin Injectable 80 milliGRAM(s) SubCutaneous every 12 hours  insulin glargine Injectable (LANTUS) 18 Unit(s) SubCutaneous at bedtime  insulin lispro (ADMELOG) corrective regimen sliding scale   SubCutaneous three times a day before meals  insulin lispro Injectable (ADMELOG) 8 Unit(s) SubCutaneous three times a day before meals  metoprolol succinate ER 25 milliGRAM(s) Oral daily  mupirocin 2% Nasal 1 Application(s) Both Nostrils every 12 hours  sacubitril 24 mG/valsartan 26 mG 1 Tablet(s) Oral two times a day  spironolactone 25 milliGRAM(s) Oral every 24 hours    PRN MEDICATIONS  acetaminophen     Tablet .. 650 milliGRAM(s) Oral every 6 hours PRN  acetaminophen     Tablet .. 975 milliGRAM(s) Oral once PRN  aluminum hydroxide/magnesium hydroxide/simethicone Suspension 30 milliLiter(s) Oral every 4 hours PRN  guaiFENesin  milliGRAM(s) Oral every 12 hours PRN  melatonin 5 milliGRAM(s) Oral at bedtime PRN  ondansetron Injectable 4 milliGRAM(s) IV Push every 8 hours PRN  zolpidem 5 milliGRAM(s) Oral at bedtime PRN    VITALS  T(F): 97.9 (04-23-25 @ 20:10), Max: 98.1 (04-23-25 @ 13:06)  HR: 98 (04-24-25 @ 08:15) (96 - 99)  BP: 135/70 (04-24-25 @ 08:15) (119/78 - 135/70)  RR: 18 (04-24-25 @ 08:15) (18 - 18)  SpO2: 99% (04-24-25 @ 08:15) (96% - 99%)  POCT Blood Glucose.: 191 mg/dL (04-24-25 @ 07:51)  POCT Blood Glucose.: 259 mg/dL (04-23-25 @ 21:04)  POCT Blood Glucose.: 207 mg/dL (04-23-25 @ 16:39)    PHYSICAL EXAM  GENERAL: NAD, lying in bed comfortably  HEAD:  Atraumatic, normocephalic  EYES: EOMI, conjunctiva and sclera clear  NECK: Supple, no JVD  HEART: Regular rate and rhythm, no murmurs, rubs, or gallops  LUNGS: Unlabored respirations.  Clear to auscultation bilaterally, no crackles, wheezing, or rhonchi  ABDOMEN: Soft, nontender, nondistended, +BS  EXTREMITIES: 2+ peripheral pulses bilaterally. No edema  NERVOUS SYSTEM:  A&Ox2, no focal deficits   SKIN: No rashes or lesions    LABS             12.0   14.59 )-----------( 405      ( 04-23-25 @ 04:54 )             37.6     141  |  100  |  25  -------------------------<  232   04-23-25 @ 04:54  4.1  |  29  |  1.2    Ca      9.4     04-23-25 @ 04:54  Mg     1.9     04-23-25 @ 04:54    TPro  6.5  /  Alb  3.3  /  TBili  0.5  /  DBili  x   /  AST  8   /  ALT  8   /  AlkPhos  93  /  GGT  x     04-23-25 @ 04:54        Urinalysis Basic - ( 23 Apr 2025 04:54 )    Color: x / Appearance: x / SG: x / pH: x  Gluc: 232 mg/dL / Ketone: x  / Bili: x / Urobili: x   Blood: x / Protein: x / Nitrite: x   Leuk Esterase: x / RBC: x / WBC x   Sq Epi: x / Non Sq Epi: x / Bacteria: x          IMAGING SUBJECTIVE/OVERNIGHT EVENTS  Today is hospital day 5d. This morning patient was seen and examined at bedside, resting comfortably in bed. No acute or major events overnight. Pt appears euvolemic this morning.    MEDICATIONS  STANDING MEDICATIONS  aMIOdarone    Tablet 200 milliGRAM(s) Oral daily  atorvastatin 40 milliGRAM(s) Oral at bedtime  buMETAnide Injectable 2 milliGRAM(s) IV Push <User Schedule>  chlorhexidine 2% Cloths 1 Application(s) Topical <User Schedule>  clopidogrel Tablet 75 milliGRAM(s) Oral daily  enoxaparin Injectable 80 milliGRAM(s) SubCutaneous every 12 hours  insulin glargine Injectable (LANTUS) 18 Unit(s) SubCutaneous at bedtime  insulin lispro (ADMELOG) corrective regimen sliding scale   SubCutaneous three times a day before meals  insulin lispro Injectable (ADMELOG) 8 Unit(s) SubCutaneous three times a day before meals  metoprolol succinate ER 25 milliGRAM(s) Oral daily  mupirocin 2% Nasal 1 Application(s) Both Nostrils every 12 hours  sacubitril 24 mG/valsartan 26 mG 1 Tablet(s) Oral two times a day  spironolactone 25 milliGRAM(s) Oral every 24 hours    PRN MEDICATIONS  acetaminophen     Tablet .. 650 milliGRAM(s) Oral every 6 hours PRN  acetaminophen     Tablet .. 975 milliGRAM(s) Oral once PRN  aluminum hydroxide/magnesium hydroxide/simethicone Suspension 30 milliLiter(s) Oral every 4 hours PRN  guaiFENesin  milliGRAM(s) Oral every 12 hours PRN  melatonin 5 milliGRAM(s) Oral at bedtime PRN  ondansetron Injectable 4 milliGRAM(s) IV Push every 8 hours PRN  zolpidem 5 milliGRAM(s) Oral at bedtime PRN    VITALS  T(F): 97.9 (04-23-25 @ 20:10), Max: 98.1 (04-23-25 @ 13:06)  HR: 98 (04-24-25 @ 08:15) (96 - 99)  BP: 135/70 (04-24-25 @ 08:15) (119/78 - 135/70)  RR: 18 (04-24-25 @ 08:15) (18 - 18)  SpO2: 99% (04-24-25 @ 08:15) (96% - 99%)  POCT Blood Glucose.: 191 mg/dL (04-24-25 @ 07:51)  POCT Blood Glucose.: 259 mg/dL (04-23-25 @ 21:04)  POCT Blood Glucose.: 207 mg/dL (04-23-25 @ 16:39)    PHYSICAL EXAM  GENERAL: NAD, lying in bed comfortably  HEAD:  Atraumatic, normocephalic  EYES: EOMI, conjunctiva and sclera clear  NECK: Supple, no JVD  HEART: Regular rate and rhythm, no murmurs, rubs, or gallops  LUNGS: Unlabored respirations.  Clear to auscultation bilaterally, no crackles, wheezing, or rhonchi  ABDOMEN: Soft, nontender, nondistended, +BS  EXTREMITIES: 2+ peripheral pulses bilaterally. No edema  NERVOUS SYSTEM:  A&Ox2, no focal deficits   SKIN: No rashes or lesions    LABS             12.0   14.59 )-----------( 405      ( 04-23-25 @ 04:54 )             37.6     141  |  100  |  25  -------------------------<  232   04-23-25 @ 04:54  4.1  |  29  |  1.2    Ca      9.4     04-23-25 @ 04:54  Mg     1.9     04-23-25 @ 04:54    TPro  6.5  /  Alb  3.3  /  TBili  0.5  /  DBili  x   /  AST  8   /  ALT  8   /  AlkPhos  93  /  GGT  x     04-23-25 @ 04:54        Urinalysis Basic - ( 23 Apr 2025 04:54 )    Color: x / Appearance: x / SG: x / pH: x  Gluc: 232 mg/dL / Ketone: x  / Bili: x / Urobili: x   Blood: x / Protein: x / Nitrite: x   Leuk Esterase: x / RBC: x / WBC x   Sq Epi: x / Non Sq Epi: x / Bacteria: x          IMAGING

## 2025-04-24 NOTE — PROGRESS NOTE ADULT - ASSESSMENT
69 y/o male with past medical history of HFrEF 43%, ischemic cardiomyopathy s/p CRT-D, AVR, hypertension, paroxysmal A-fib (status post ablation 1/14/25), CAD s/p PCI (2019), diabetes, hyperlipidemia, COPD (on 2L home O2), frequent  falls, presented to the ED with complaint of acute onset SOB and 10/10 chest pain since afternoon. As per EMS, the pt was having runs of VT. Upon ED presentation, the pt was noted to be in sinus tachycardia rhythm and with associated hypoxia. The pt was started on BiPAP and placed on a nitroglycerin drip. He reported improvement in symptoms which resolution of chest pain. Patient was admitted to  step down for hypertensive emergency with flush pulmonary edema and NSTEMI. Patient off nitro drip now and on entresto, metoprolol and spironolactone. BP well controlled for now.  repeat echo showed a drop from 43% to 30%. Patient seen by cardio and planning for cath when stable.    Of note the patient was intermittent angry, and wanted to leave against medical advice. While he was oriented, he did not understand the gravity of his medical condition. He stated that he will take a cab and go home despite realizing that he is short of breath and on oxygen. He was seen by psychiatry who agreed that he presently lacks capacity to make his decisions to leave AMA, and to defer to next of kin.    The patient had few runs of NSVT while on T. His mag and potassium were found to be low (he is on IV bumetanide). His BP remained stable and he remained without chest pain. Electrolyte repletion was ordered. Patient is stable to be downgraded to medicine-telemetry.    #ADHF with pulmonary edema  #Hypertensive emergency with flash pulmonary edema  #HFrEF  #NSTEMI  - BNP 3.6k, trop peak 166 >>162  - Lactate 6.4 >> 1.8  - CXR 4/18: Bilateral opacities, congested  - Echo 4/20 noted. EF 30%. drop from Jan 2025 when the LVEF was 43%.  - Off nitro drip since 4/20 AM, BP better controlled now.  - Continue with GDMT: entresto , spironolactone and metoprolol. Tolerating GDMT for now. Add SGLT2 and  Increase to highest tolerated dose when patient more stable  - BMP BID  - Daily wt, strict I/O Target -2L  - 4/22: Patient Tachypneic on 4L NC, bipap restarted  - Xray showing improved effusions.   - cw Bumex BID  - palliative consult: full code, cw med mgmt, son Neto is decision maker  - Plan to cath when patient more stable    #Agitation  - Patient intermittently agitated, pulling out IV line and screaming at staff.  - Patient insisting on leaving AMA, understands that it may result in death given his current medical state, he stated to let him die and that he "wants to get as close to his parents as possible". See progress note from 4/21.  - The above occurred while Valencia, spouse, was on the phone at patient's bedside. She does not want the patient to leave AMA.  - Psychiatry consulted, patient does not have capacity to leave against medical advice.    #SIRS present on admission - Less likely infection  - was started cefepime empirically.  - ID recs appreciated: Clinical picture not consistent with sepsis. DC abx.  - FBcNGT  - SARS Flu RSV negative.  - No UA done.    #Paroxysmal afib  #PPM  - Eliquis held in case ischemic workup is to be done inpatient for acute drop in ejection fracture.  - Heparin drip switched to enoxaparin given improvement in kidney function.  - Metoprolol succinate ER 25mg, amiodarone 200 QD.    #CAD  - C/w clopidogrel, atorvastatin.    #DM  - ISS  - monitor FS    #MISC  #DVT PPX: Enoxaparin  #GI PPX: Pantoprazole  #Diet: Dash   #Code Status: full  #Activity Order: IAT    #PENDING  - Plan to cath when patient more stable 69 y/o male with past medical history of HFrEF 43%, ischemic cardiomyopathy s/p CRT-D, AVR, hypertension, paroxysmal A-fib (status post ablation 1/14/25), CAD s/p PCI (2019), diabetes, hyperlipidemia, COPD (on 2L home O2), frequent  falls, presented to the ED with complaint of acute onset SOB and 10/10 chest pain since afternoon. As per EMS, the pt was having runs of VT. Upon ED presentation, the pt was noted to be in sinus tachycardia rhythm and with associated hypoxia. The pt was started on BiPAP and placed on a nitroglycerin drip. He reported improvement in symptoms which resolution of chest pain. Patient was admitted to  step down for hypertensive emergency with flush pulmonary edema and NSTEMI. Patient off nitro drip now and on entresto, metoprolol and spironolactone. BP well controlled for now.  repeat echo showed a drop from 43% to 30%. Patient seen by cardio and planning for cath when stable.    Of note the patient was intermittent angry, and wanted to leave against medical advice. While he was oriented, he did not understand the gravity of his medical condition. He stated that he will take a cab and go home despite realizing that he is short of breath and on oxygen. He was seen by psychiatry who agreed that he presently lacks capacity to make his decisions to leave AMA, and to defer to next of kin.    The patient had few runs of NSVT while on T. His mag and potassium were found to be low (he is on IV bumetanide). His BP remained stable and he remained without chest pain. Electrolyte repletion was ordered. Patient is stable to be downgraded to medicine-telemetry.    #ADHF with pulmonary edema  #Hypertensive emergency with flash pulmonary edema  #HFrEF  #NSTEMI  - BNP 3.6k, trop peak 166 >>162  - Lactate 6.4 >> 1.8  - CXR 4/18: Bilateral opacities, congested  - Echo 4/20 noted. EF 30%. drop from Jan 2025 when the LVEF was 43%.  - Off nitro drip since 4/20 AM, BP better controlled now.  - Continue with GDMT: entresto , spironolactone and metoprolol. Tolerating GDMT for now. Add SGLT2 and  Increase to highest tolerated dose when patient more stable  - BMP BID  - Daily wt, strict I/O Target -2L  - 4/22: Patient Tachypneic on 4L NC, bipap restarted  - Xray showing improved effusions.   - cw Bumex BID  - palliative consult: full code, cw med mgmt, son Neto is decision maker  - no cath this admission  - f/u cardio recs  - f/u repeat CXR, consider switching to oral diuretics     #Agitation  - Patient intermittently agitated, pulling out IV line and screaming at staff.  - Patient insisting on leaving AMA, understands that it may result in death given his current medical state, he stated to let him die and that he "wants to get as close to his parents as possible". See progress note from 4/21.  - The above occurred while Valencia, spouse, was on the phone at patient's bedside. She does not want the patient to leave AMA.  - Psychiatry consulted, patient does not have capacity to leave against medical advice.    #SIRS present on admission - Less likely infection  - was started cefepime empirically.  - ID recs appreciated: Clinical picture not consistent with sepsis. DC abx.  - FBcNGT  - SARS Flu RSV negative.  - No UA done.    #Paroxysmal afib  #PPM  - Eliquis held in case ischemic workup is to be done inpatient for acute drop in ejection fracture.  - Heparin drip switched to enoxaparin given improvement in kidney function.  - Metoprolol succinate ER 25mg, amiodarone 200 QD.    #CAD  - C/w clopidogrel, atorvastatin.    #DM  - ISS  - monitor FS    #MISC  #DVT PPX: Enoxaparin  #GI PPX: Pantoprazole  #Diet: Dash   #Code Status: full  #Activity Order: IAT    #PENDING  - cw Bumex BID  - palliative consult: full code, cw med mgmt, noble Duque is decision maker  - no cath this admission  - f/u cardio recs  - f/u repeat CXR, consider switching to oral diuretics

## 2025-04-24 NOTE — PROGRESS NOTE ADULT - SUBJECTIVE AND OBJECTIVE BOX
SUBJ:  PT seen and examined.  Still has SOB    MEDICATIONS  (STANDING):  aMIOdarone    Tablet 200 milliGRAM(s) Oral daily  atorvastatin 40 milliGRAM(s) Oral at bedtime  buMETAnide Injectable 2 milliGRAM(s) IV Push <User Schedule>  chlorhexidine 2% Cloths 1 Application(s) Topical <User Schedule>  clopidogrel Tablet 75 milliGRAM(s) Oral daily  enoxaparin Injectable 80 milliGRAM(s) SubCutaneous every 12 hours  insulin glargine Injectable (LANTUS) 18 Unit(s) SubCutaneous at bedtime  insulin lispro (ADMELOG) corrective regimen sliding scale   SubCutaneous three times a day before meals  insulin lispro Injectable (ADMELOG) 8 Unit(s) SubCutaneous three times a day before meals  metoprolol succinate ER 25 milliGRAM(s) Oral daily  mupirocin 2% Nasal 1 Application(s) Both Nostrils every 12 hours  sacubitril 24 mG/valsartan 26 mG 1 Tablet(s) Oral two times a day  spironolactone 25 milliGRAM(s) Oral every 24 hours    MEDICATIONS  (PRN):  acetaminophen     Tablet .. 650 milliGRAM(s) Oral every 6 hours PRN Temp greater or equal to 38C (100.4F), Mild Pain (1 - 3)  acetaminophen     Tablet .. 975 milliGRAM(s) Oral once PRN Moderate Pain (4 - 6)  aluminum hydroxide/magnesium hydroxide/simethicone Suspension 30 milliLiter(s) Oral every 4 hours PRN Dyspepsia  guaiFENesin  milliGRAM(s) Oral every 12 hours PRN Cough  melatonin 5 milliGRAM(s) Oral at bedtime PRN Insomnia  ondansetron Injectable 4 milliGRAM(s) IV Push every 8 hours PRN Nausea and/or Vomiting  zolpidem 5 milliGRAM(s) Oral at bedtime PRN Insomnia            Vital Signs Last 24 Hrs  T(C): 36.6 (23 Apr 2025 20:10), Max: 36.7 (23 Apr 2025 13:06)  T(F): 97.9 (23 Apr 2025 20:10), Max: 98.1 (23 Apr 2025 13:06)  HR: 98 (24 Apr 2025 08:15) (96 - 99)  BP: 135/70 (24 Apr 2025 08:15) (119/78 - 135/70)  BP(mean): --  RR: 18 (24 Apr 2025 08:15) (18 - 18)  SpO2: 99% (24 Apr 2025 08:15) (96% - 99%)    Parameters below as of 24 Apr 2025 08:15  Patient On (Oxygen Delivery Method): nasal cannula  O2 Flow (L/min): 2      REVIEW OF SYSTEMS:  CONSTITUTIONAL: No fever, weight loss, or fatigue  RESPIRATORY: SOB  CARDIOVASCULAR: No chest pain, palpitations, dizziness, or leg swelling      PHYSICAL EXAM:  · CONSTITUTIONAL:	Well-developed, well nourished    BMI-  ·RESPIRATORY:   crackles  · CARDIOVASCULAR	regular rate and rhythm  no rub  no murmur  normal PMI    TELEMETRY:    ECG: A paced/ V paced    TTE: EF 30%, MOd TR, Bioprosthetic Valve- no AS or paravalvular leak, Mild reduced RV    LABS:                        12.4   15.58 )-----------( 394      ( 24 Apr 2025 09:48 )             38.3     04-23    141  |  100  |  25[H]  ----------------------------<  232[H]  4.1   |  29  |  1.2    Ca    9.4      23 Apr 2025 04:54  Mg     1.9     04-23    TPro  6.5  /  Alb  3.3[L]  /  TBili  0.5  /  DBili  x   /  AST  8   /  ALT  8   /  AlkPhos  93  04-23          Creatinine Trend: 1.2<--, 1.2<--, 1.2<--, 1.4<--, 1.7<--, 1.7<--  I&O's Summary    23 Apr 2025 07:01  -  24 Apr 2025 07:00  --------------------------------------------------------  IN: 360 mL / OUT: 2400 mL / NET: -2040 mL    24 Apr 2025 07:01  -  24 Apr 2025 10:35  --------------------------------------------------------  IN: 0 mL / OUT: 300 mL / NET: -300 mL      BNP  RADIOLOGY & ADDITIONAL STUDIES:    IMPRESSION AND PLAN:

## 2025-04-24 NOTE — PROGRESS NOTE ADULT - ATTENDING COMMENTS
70-year-old male past medical history ischemic cardiomyopathy, HFrEF, hypertension, hyperlipidemia, diabetes presenting with ADHF/chest pain.  Course notable for agitation.    Labs reviewed notable for mild leukocytosis but no fevers.  Hyperglycemia but overall controlled.  TTE was moderately reduced LVEF which is slightly decreased from prior.  Troponin was minimally elevated.  At this time I do not think it is urgent for any ischemic evaluation.    On exam he is warm perfused.  He continues to be hypervolemic and will continue IV Bumex 2 mg twice daily for net goal 2 L.  Continue Entresto 24-26 mg twice daily and metoprolol succinate 25 mg daily.    Patient does not require any stepdown needs and will transfer to medicine after psychiatric evaluation as patient is requesting AMA.  Patient can be followed by general cardiology on medicine.    Gunnar Bryant MD  Interventional Cardiology/Advanced Heart Failure Transplant
Patient denies anginal symptoms. He does not require inpatient cardiac catheterization. Ischemic evaluation can be decided upon as an outpatient. Cardiology will sign off.
Patient seen and examined at bedside   70 year old male with history of HTN, HLD, CAD s/p MANN to mid LAD in 2021, ICM s/p AICD, AF on ac with eliquis, admitted with chest pain and acute on chronic systolic heart failure. s/p Bioprosthetic AV, not well compliant   #Acute on chronic HFrEF exacerbation  #Hypertensive emergency  # NSTEMI  #KAREN on CKD     still volume overload,  in morning, patient is awake oriented , however agitated and aggressive, refused chest x ray and blood test and removed IV lines, want to leave AMA  explain extreme including death if left AMA at this point.   later he accept to do labs   still requiring oxygen volume overload, n  echo EF reduced ~ 30%   Patient still need IV diuresis, once euvolemic, may need repeat ischemic workup if agreeable and more compliant.   other plan as outlined above
No
Yes

## 2025-04-25 ENCOUNTER — TRANSCRIPTION ENCOUNTER (OUTPATIENT)
Age: 70
End: 2025-04-25

## 2025-04-25 VITALS
HEART RATE: 83 BPM | RESPIRATION RATE: 18 BRPM | DIASTOLIC BLOOD PRESSURE: 66 MMHG | TEMPERATURE: 98 F | SYSTOLIC BLOOD PRESSURE: 118 MMHG

## 2025-04-25 LAB
GLUCOSE BLDC GLUCOMTR-MCNC: 209 MG/DL — HIGH (ref 70–99)
GLUCOSE BLDC GLUCOMTR-MCNC: 218 MG/DL — HIGH (ref 70–99)

## 2025-04-25 PROCEDURE — 99239 HOSP IP/OBS DSCHRG MGMT >30: CPT

## 2025-04-25 RX ORDER — DULAGLUTIDE 4.5 MG/.5ML
1.5 INJECTION, SOLUTION SUBCUTANEOUS
Qty: 1 | Refills: 3
Start: 2025-04-25

## 2025-04-25 RX ORDER — DAPAGLIFLOZIN 5 MG/1
1 TABLET, FILM COATED ORAL
Qty: 0 | Refills: 0 | DISCHARGE
Start: 2025-04-25

## 2025-04-25 RX ORDER — SPIRONOLACTONE 25 MG
1 TABLET ORAL
Qty: 0 | Refills: 0 | DISCHARGE
Start: 2025-04-25

## 2025-04-25 RX ORDER — DAPAGLIFLOZIN 5 MG/1
1 TABLET, FILM COATED ORAL
Qty: 30 | Refills: 3
Start: 2025-04-25 | End: 2025-08-22

## 2025-04-25 RX ORDER — DAPAGLIFLOZIN 5 MG/1
10 TABLET, FILM COATED ORAL DAILY
Refills: 0 | Status: DISCONTINUED | OUTPATIENT
Start: 2025-04-25 | End: 2025-04-25

## 2025-04-25 RX ORDER — METOPROLOL SUCCINATE 50 MG/1
1 TABLET, EXTENDED RELEASE ORAL
Qty: 30 | Refills: 3
Start: 2025-04-25 | End: 2025-08-22

## 2025-04-25 RX ADMIN — CLOPIDOGREL BISULFATE 75 MILLIGRAM(S): 75 TABLET, FILM COATED ORAL at 11:47

## 2025-04-25 RX ADMIN — DAPAGLIFLOZIN 10 MILLIGRAM(S): 5 TABLET, FILM COATED ORAL at 11:47

## 2025-04-25 RX ADMIN — BUMETANIDE 2 MILLIGRAM(S): 1 TABLET ORAL at 08:20

## 2025-04-25 RX ADMIN — INSULIN LISPRO 8 UNIT(S): 100 INJECTION, SOLUTION INTRAVENOUS; SUBCUTANEOUS at 08:12

## 2025-04-25 RX ADMIN — INSULIN LISPRO 2: 100 INJECTION, SOLUTION INTRAVENOUS; SUBCUTANEOUS at 08:11

## 2025-04-25 RX ADMIN — INSULIN LISPRO 8 UNIT(S): 100 INJECTION, SOLUTION INTRAVENOUS; SUBCUTANEOUS at 11:47

## 2025-04-25 RX ADMIN — INSULIN LISPRO 2: 100 INJECTION, SOLUTION INTRAVENOUS; SUBCUTANEOUS at 11:46

## 2025-04-25 NOTE — PHYSICAL THERAPY INITIAL EVALUATION ADULT - PERTINENT HX OF CURRENT PROBLEM, REHAB EVAL
Patient is a 69 y/o male with past medical history of HFrEF 43%, ischemic cardiomyopathy s/p CRT-D, AVR, hypertension, paroxysmal A-fib (status post ablation 1/14/25), CAD s/p PCI (2019), diabetes, hyperlipidemia, COPD (on 2L home O2), frequent  falls, presented to the ED with complaint of acute onset SOB and 10/10 chest pain since afternoon. As per EMS, the pt was having runs of VT. Upon ED presentation, the pt was noted to be in sinus tachycardia rhythm and with associated hypoxia. The pt was started on BiPAP and placed on a nitroglycerin drip. He reported improvement in symptoms which resolution of chest pain. Patient was admitted to 4T step down for hypertensive emergency with flush pulmonary edema and NSTEMI. Patient off nitro drip now and on entresto, metoprolol and spironolactone. BP well controlled for now.  repeat echo showed a drop from 43% to 30%. Patient seen by cardio and planning for cath when stable.   The patient had few runs of NSVT while on 4T. His mag and potassium were found to be low (he is on IV bumetanide). His BP remained stable and he remained without chest pain. Electrolyte repletion was ordered. Patient is stable to be downgraded to medicine-telemetry.

## 2025-04-25 NOTE — PHYSICAL THERAPY INITIAL EVALUATION ADULT - ADL SKILLS, REHAB EVAL
After Visit Summary   5/15/2018    Donnie Vincent    MRN: [id number]           Patient Information     Date Of Birth          1943        Visit Information        Provider Department      5/15/2018 2:20 PM Suyapa Leon MD [facility]        Today's Diagnoses     Type 2 diabetes mellitus with stage 3 chronic kidney disease, with long-term current use of insulin (H)    -  1    Major depressive disorder, recurrent episode, mild (H)        Mixed hyperlipidemia        Other fatigue        Seizure disorder (H)        Morbid obesity (H)        Essential hypertension        Hyperlipidemia LDL goal <100        Iron deficiency anemia, unspecified iron deficiency anemia type           Follow-ups after your visit        Additional Services     GASTROENTEROLOGY ADULT REF CONSULT ONLY       Preferred Location: [facility] [phone]      Please be aware that coverage of these services is subject to the terms and limitations of your health insurance plan.  Call member services at your health plan with any benefit or coverage questions.  Any procedures must be performed at a [facility] facility OR coordinated by your clinic's referral office.    Please bring the following with you to your appointment:    (1) Any X-Rays, CTs or MRIs which have been performed.  Contact the facility where they were done to arrange for  prior to your scheduled appointment.    (2) List of current medications   (3) This referral request   (4) Any documents/labs given to you for this referral                  Who to contact     If you have questions or need follow up information about today's clinic visit or your schedule please contact [facility] directly at [phone].  Normal or non-critical lab and imaging results will be communicated to you by MyChart, letter or phone within 4 business days after the clinic has received the results. If you do not hear from us within 7 days,  "please contact the clinic through [employer] or phone. If you have a critical or abnormal lab result, we will notify you by phone as soon as possible.  Submit refill requests through [employer] or call your pharmacy and they will forward the refill request to us. Please allow 3 business days for your refill to be completed.          Additional Information About Your Visit        [employer] Information     [employer] lets you send messages to your doctor, view your test results, renew your prescriptions, schedule appointments and more. To sign up, go to [email or web address] . Click on \"Log in\" on the left side of the screen, which will take you to the Welcome page. Then click on \"Sign up Now\" on the right side of the page.     You will be asked to enter the access code listed below, as well as some personal information. Please follow the directions to create your username and password.     Your access code is: [email or web address]  Expires: 8/15/2018 11:04 PM     Your access code will  in 90 days. If you need help or a new code, please call your [facility] clinic or [phone].        Care EveryWhere ID     This is your Care EveryWhere ID. This could be used by other organizations to access your [facility] medical records  [id number]        Your Vitals Were     Pulse Temperature Respirations Pulse Oximetry BMI (Body Mass Index)       102 97.7  F (36.5  C) (Oral) 16 98% 34.94 kg/m2        Blood Pressure from Last 3 Encounters:   05/15/18 118/74   17 112/72   17 (!) 149/98    Weight from Last 3 Encounters:   05/15/18 250 lb 8 oz (113.6 kg)   17 264 lb (119.7 kg)   16 265 lb (120.2 kg)              We Performed the Following     Albumin Random Urine Quantitative with Creat Ratio     CBC with platelets     Comprehensive metabolic panel     GASTROENTEROLOGY ADULT REF CONSULT ONLY     Hemoglobin A1c     Lipid panel reflex to direct LDL Fasting     TSH with free T4 reflex     Vitamin B12          Today's " Medication Changes          These changes are accurate as of 5/15/18 11:59 PM.  If you have any questions, ask your nurse or doctor.               These medicines have changed or have updated prescriptions.        Dose/Directions    albuterol 108 (90 Base) MCG/ACT Inhaler   Commonly known as:  PROAIR HFA/PROVENTIL HFA/VENTOLIN HFA   This may have changed:    - when to take this  - reasons to take this   Used for:  COPD (chronic obstructive pulmonary disease) (H)        Dose:  2 puff   Inhale 2 puffs into the lungs every 6 hours   Quantity:  1 Inhaler   Refills:  6       LANTUS SOLOSTAR 100 UNIT/ML injection   This may have changed:  how much to take   Generic drug:  insulin glargine   Changed by:  Suyapa Leon MD        Dose:  14 Units   Inject 14 Units Subcutaneous At Bedtime   Refills:  0            Where to get your medicines      These medications were sent to [facility] [address] [place] [address]     Phone:  [phone]     fenofibrate 160 MG tablet    glipiZIDE 10 MG tablet    simvastatin 10 MG tablet                Primary Care Provider Office Phone # Fax #    Suyapa Leon [phone]       [address] [facility] [address]        Equal Access to Services     KINA CEDILLO : Hadii raisa ku hadasho Soomaali, waaxda luqadaha, qaybta kaalmada adeegyada, aquilino dumont. So [facility] [phone].    ATENCIÓN: Si habla español, tiene a navarrete disposición servicios gratuitos de asistencia lingüística. Terrence al [phone].    We comply with applicable federal civil rights laws and [place] laws. We do not discriminate on the basis of race, color, national origin, age, disability, sex, sexual orientation, or gender identity.            Thank you!     Thank you for choosing [facility]  for your care. Our goal is always to provide  you with excellent care. Hearing back from our patients is one way we can continue to improve our services. Please take a few minutes to complete the written survey that you may receive in the mail after your visit with us. Thank you!             Your Updated Medication List - Protect others around you: Learn how to safely use, store and throw away your medicines at www.disposemymeds.org.          This list is accurate as of 5/15/18 11:59 PM.  Always use your most recent med list.                   Brand Name Dispense Instructions for use Diagnosis    acetaminophen-codeine 300-30 MG per tablet    TYLENOL w/CODEINE No. 3    40 tablet    Take 1 tablet by mouth 2 times daily as needed for mild pain    Other type of migraine       albuterol 108 (90 Base) MCG/ACT Inhaler    PROAIR HFA/PROVENTIL HFA/VENTOLIN HFA    1 Inhaler    Inhale 2 puffs into the lungs every 6 hours    COPD (chronic obstructive pulmonary disease) (H)       ASPIRIN PO      Take 81 mg by mouth        clopidogrel 75 MG tablet    PLAVIX    90 tablet    Take 1 tablet (75 mg) by mouth daily    Acute ischemic stroke (H)       cyanocobalamin 1000 MCG/ML injection    VITAMIN B12     Inject 1 mL into the muscle every 30 days See standing orders Dr. Chi. Vit B-12 injection every week x 4. Than monthly for one year. 1000 mcg/ml        fenofibrate 160 MG tablet     90 tablet    Take 1 tablet (160 mg) by mouth daily    Hyperlipidemia LDL goal <100       ferrous sulfate 325 (65 Fe) MG tablet    IRON    90 tablet    Take 1 tablet (325 mg) by mouth daily (with breakfast)    Iron deficiency anemia, unspecified       FLUoxetine 40 MG capsule    PROzac    90 capsule    Take 1 capsule (40 mg) by mouth daily    Major depressive disorder, recurrent episode, mild (H)       folic acid 1 MG tablet    FOLVITE    90 tablet    Take 1 tablet (1 mg) by mouth daily    Increased homocysteine (H)       glipiZIDE 10 MG tablet    GLUCOTROL    180 tablet    Take 1 tablet (10 mg) by  mouth 2 times daily (before meals)    Type 2 diabetes mellitus with stage 3 chronic kidney disease, with long-term current use of insulin (H)       insulin pen needle 31G X 5 MM     100 each    Use 1-2 pen needles daily or as directed.    Type 2 diabetes with stage 3 chronic kidney disease GFR 30-59 (H)       KEPPRA 500 MG tablet   Generic drug:  levETIRAcetam      Take 500 mg by mouth 2 times daily        LANTUS SOLOSTAR 100 UNIT/ML injection   Generic drug:  insulin glargine      Inject 14 Units Subcutaneous At Bedtime        liraglutide 18 MG/3ML soln    VICTOZA     Inject 1.8 mg Subcutaneous daily        lisinopril 10 MG tablet    PRINIVIL/ZESTRIL    90 tablet    Take 1 tablet (10 mg) by mouth daily    Essential hypertension       metoprolol succinate 100 MG 24 hr tablet    TOPROL-XL     Take 100 mg by mouth 2 times daily        pantoprazole sodium 40 MG packet    PROTONIX     Take 1 packet by mouth daily        simvastatin 10 MG tablet    ZOCOR    90 tablet    Take 1 tablet (10 mg) by mouth At Bedtime    Hyperlipidemia LDL goal <100       SYMBICORT 80-4.5 MCG/ACT Inhaler   Generic drug:  budesonide-formoterol      Inhale 2 puffs into the lungs as needed        tamsulosin 0.4 MG capsule    FLOMAX    90 capsule    Take 1 capsule (0.4 mg) by mouth daily    BPH (benign prostatic hypertrophy)       TIOTROPIUM BROMIDE INHALATION POWDER 18MCG CAP      daily Once a day        topiramate 50 MG tablet    TOPAMAX    90 tablet    Take 1 tablet (50 mg) by mouth daily    Other forms of migraine, without mention of intractable migraine without mention of status migrainosus       TRADJENTA 5 MG Tabs tablet   Generic drug:  linagliptin      Take 5 mg by mouth daily Pt takes 1/2 tab daily           independent

## 2025-04-25 NOTE — DISCHARGE NOTE PROVIDER - CARE PROVIDERS DIRECT ADDRESSES
[email or web address]

## 2025-04-25 NOTE — PROGRESS NOTE ADULT - SUBJECTIVE AND OBJECTIVE BOX
MONIQUE LYONS  70y Male    CHIEF COMPLAINT:    Patient is a 70y old  Male who presents with a chief complaint of Tachycardia and Pulmonary Edema (19 Apr 2025 08:46)      INTERVAL HPI/OVERNIGHT EVENTS:    Patient seen and examined. Sitting comfortably in a chair. No SOB. On RA. No cp    ROS: All other systems are negative.    Vital Signs:    T(F): 97.6 (04-25-25 @ 08:05), Max: 98.7 (04-24-25 @ 20:04)  HR: 82 (04-25-25 @ 08:05) (52 - 98)  BP: 146/82 (04-25-25 @ 08:05) (97/64 - 146/82)  RR: 18 (04-25-25 @ 08:05) (17 - 18)  SpO2: 94% (04-25-25 @ 08:05) (92% - 98%)  I&O's Summary    24 Apr 2025 07:01  -  25 Apr 2025 07:00  --------------------------------------------------------  IN: 477 mL / OUT: 600 mL / NET: -123 mL      Daily     Daily   CAPILLARY BLOOD GLUCOSE      POCT Blood Glucose.: 209 mg/dL (25 Apr 2025 07:20)  POCT Blood Glucose.: 212 mg/dL (24 Apr 2025 21:05)  POCT Blood Glucose.: 129 mg/dL (24 Apr 2025 16:28)  POCT Blood Glucose.: 213 mg/dL (24 Apr 2025 11:29)      PHYSICAL EXAM:    GENERAL:  NAD  SKIN: No rashes or lesions  HENT: Atraumatic. Normocephalic. PERRL. Moist membranes.  NECK: Supple, No JVD. No lymphadenopathy.  PULMONARY: CTA B/L. No wheezing. No rales  CVS: Normal S1, S2. Rate and Rhythm are regular. No murmurs.  ABDOMEN/GI: Soft, Nontender, Nondistended; BS present  EXTREMITIES: Peripheral pulses intact. No edema B/L LE.  NEUROLOGIC:  No motor or sensory deficit.  PSYCH: Alert & oriented x 3    Consultant(s) Notes Reviewed:  [x ] YES  [ ] NO  Care Discussed with Consultants/Other Providers [ x] YES  [ ] NO    EKG reviewed  Telemetry reviewed    LABS:                        12.4   15.58 )-----------( 394      ( 24 Apr 2025 09:48 )             38.3     04-24    139  |  99  |  25[H]  ----------------------------<  224[H]  3.8   |  29  |  1.1    Ca    9.4      24 Apr 2025 09:48  Phos  3.4     04-24  Mg     1.7     04-24    TPro  6.6  /  Alb  3.4[L]  /  TBili  0.4  /  DBili  x   /  AST  12  /  ALT  9   /  AlkPhos  89  04-24              RADIOLOGY & ADDITIONAL TESTS:      Imaging or report Personally Reviewed:  [ ] YES  [ ] NO    Medications:  Standing  aMIOdarone    Tablet 200 milliGRAM(s) Oral daily  atorvastatin 40 milliGRAM(s) Oral at bedtime  buMETAnide Injectable 2 milliGRAM(s) IV Push <User Schedule>  chlorhexidine 2% Cloths 1 Application(s) Topical <User Schedule>  clopidogrel Tablet 75 milliGRAM(s) Oral daily  dapagliflozin 10 milliGRAM(s) Oral daily  enoxaparin Injectable 80 milliGRAM(s) SubCutaneous every 12 hours  insulin glargine Injectable (LANTUS) 18 Unit(s) SubCutaneous at bedtime  insulin lispro (ADMELOG) corrective regimen sliding scale   SubCutaneous three times a day before meals  insulin lispro Injectable (ADMELOG) 8 Unit(s) SubCutaneous three times a day before meals  metoprolol succinate ER 25 milliGRAM(s) Oral daily  mupirocin 2% Nasal 1 Application(s) Both Nostrils every 12 hours  sacubitril 24 mG/valsartan 26 mG 1 Tablet(s) Oral two times a day  spironolactone 25 milliGRAM(s) Oral every 24 hours    PRN Meds  acetaminophen     Tablet .. 650 milliGRAM(s) Oral every 6 hours PRN  acetaminophen     Tablet .. 975 milliGRAM(s) Oral once PRN  aluminum hydroxide/magnesium hydroxide/simethicone Suspension 30 milliLiter(s) Oral every 4 hours PRN  guaiFENesin  milliGRAM(s) Oral every 12 hours PRN  melatonin 5 milliGRAM(s) Oral at bedtime PRN  ondansetron Injectable 4 milliGRAM(s) IV Push every 8 hours PRN  zolpidem 5 milliGRAM(s) Oral at bedtime PRN      Case discussed with resident    Care discussed with pt/family

## 2025-04-25 NOTE — DISCHARGE NOTE PROVIDER - NSDCMRMEDTOKEN_GEN_ALL_CORE_FT
amiodarone 200 mg oral tablet: 1 tab(s) orally once a day  apixaban 5 mg oral tablet: 1 tab(s) orally every 12 hours  atorvastatin 40 mg oral tablet: 1 tab(s) orally once a day  clopidogrel 75 mg oral tablet: 1 tab(s) orally once a day  dapagliflozin 10 mg oral tablet: 1 tab(s) orally once a day  Entresto 24 mg-26 mg oral tablet: 1 tab(s) orally 2 times a day  eszopiclone 3 mg oral tablet: 1 tab(s) orally once a day (at bedtime)  ezetimibe 10 mg oral tablet: 1 orally once a day  famotidine 40 mg oral tablet: 1 tab(s) orally once a day  fenofibrate 145 mg oral tablet: 1 orally once a day  metFORMIN 500 mg oral tablet: 1 tab(s) orally 2 times a day  metoprolol succinate 50 mg oral tablet, extended release: 1 tab(s) orally once a day  spironolactone 25 mg oral tablet: 1 tab(s) orally every 24 hours  torsemide 20 mg oral tablet: 1 tab(s) orally once a day  Tresiba 100 units/mL subcutaneous solution: 34 unit(s) subcutaneous once a day (in the morning)

## 2025-04-25 NOTE — DISCHARGE NOTE PROVIDER - NSDCFUSCHEDAPPT_GEN_ALL_CORE_FT
[facility] Physician [facility]  CARDIOLOGY [address] [facility]  Scheduled Appointment: 05/22/2025

## 2025-04-25 NOTE — PHYSICAL THERAPY INITIAL EVALUATION ADULT - ADDITIONAL COMMENTS
Patient stated he lives with wife and two son in a private house with 7 steps to enter with Lt rail and chair lift within the house. Patient used cane for ambulation prior to admission and owns RW and wheelchair at home.

## 2025-04-25 NOTE — DISCHARGE NOTE PROVIDER - NSDCCPCAREPLAN_GEN_ALL_CORE_FT
PRINCIPAL DISCHARGE DIAGNOSIS  Diagnosis: Acute pulmonary edema  Assessment and Plan of Treatment: You were admitted to the hospital for hypertensive emergency, heart failure, and NSTEMI. You were treated with medical management, including a nitroglycerine drip and IV diuretics. You were weaned off the IV medications as your condition imporved. You are on several medications for your heart condition and medications for your diabetes. Please take all medications as prescribed. Please follow up with cardiology and endocrinology as an outpatient for further management of these conditions. You were also found to have persistently high white blood cell counts (also known as chronic leukocytosis). Please follow up outpatient with hematology/oncology for further workup of this condition.   If your symptoms return or worsen, please return to the hospital or seek medical attention immediately.

## 2025-04-25 NOTE — DISCHARGE NOTE PROVIDER - HOSPITAL COURSE
HOSPITAL COURSE: 71 y/o male with past medical history of HFrEF 43%, ischemic cardiomyopathy s/p CRT-D, AVR, hypertension, paroxysmal A-fib (status post ablation 1/14/25), CAD s/p PCI (2019), diabetes, hyperlipidemia, COPD (on 2L home O2), frequent  falls, presented to the ED with complaint of acute onset SOB and 10/10 chest pain since afternoon. As per EMS, the pt was having runs of VT. Upon ED presentation, the pt was noted to be in sinus tachycardia rhythm and with associated hypoxia. The pt was started on BiPAP and placed on a nitroglycerin drip. He reported improvement in symptoms which resolution of chest pain. Denied any fever, chills, headache, changes in vision, cough, congestion, n/v/d, abd pain, constipation, urinary complaints, lower extremity pain/swelling.      The patient was admitted to 4T stepdown where he was weaned off nitroglycerine drip by 4/20/25 morning. He remained on 4L O2 via NC, still feeling shortness of breath. The patient was intermittent angry, and wanted to leave against medical advice. While he was oriented, he did not understand the gravity of his medical condition. He stated that he will take a cab and go home despite realizing that he is short of breath and on oxygen. He was seen by psychiatry who agreed that he presently lacks capacity to make his decisions to leave AMA, and to defer to next of kin.      The patient had few runs of NSVT while on 4T. His mag and potassium were found to be low (he is on IV bumetanide). His BP remained stable and he remained without chest pain. Electrolyte repletion was ordered. Patient is stable to be downgraded to medicine-telemetry.    On Med/Tele floor IV diuresis was continued until pt was euvolemic. Pt can be discharged with home medication and Dapagliflozin which was started in the hospital. Pt should follow up outpatient with heme/onc for chronic leukocytosis. Pt should also follow up with endocrinology for management of his diabetes medications.     Discussion of discharge plan of care, including discharge diagnoses, medication reconciliation, and follow-ups was conducted with Dr. Gallo on 4/25/25 and discharge was approved.

## 2025-04-25 NOTE — PHYSICAL THERAPY INITIAL EVALUATION ADULT - PHYSICAL ASSIST/NONPHYSICAL ASSIST: GAIT, REHAB EVAL
decreased safety awareness, needed VCs for proper use of RW to prevent fall./supervision/verbal cues

## 2025-04-25 NOTE — DISCHARGE NOTE PROVIDER - NSDCFUADDAPPT_GEN_ALL_CORE_FT
APPTS ARE READY TO BE MADE: [ ] YES    Best Family or Patient Contact (if needed):    Additional Information about above appointments (if needed):    1: cardio  2: endo    Other comments or requests:

## 2025-04-25 NOTE — DISCHARGE NOTE NURSING/CASE MANAGEMENT/SOCIAL WORK - PATIENT PORTAL LINK FT
You can access the FollowMyHealth Patient Portal offered by [facility] by registering at the following website: [email or web address]. By joining [facility]’s FollowMyHealth portal, you will also be able to view your health information using other applications (apps) compatible with our system.

## 2025-04-25 NOTE — DISCHARGE NOTE PROVIDER - CARE PROVIDER_API CALL
Behuria, Supreeti  Cardiology  [address] [place] [address], [address]  [place] [address]  Phone: [phone]  Fax: [phone]  Follow Up Time: 1 week    Cecy Gonzalez  Endocrinology/Metab/Diabetes  [address] [place] [address]  Phone: [phone]  Fax: [phone]  Follow Up Time: 1 week

## 2025-04-25 NOTE — DISCHARGE NOTE PROVIDER - PROVIDER TOKENS
PROVIDER:[TOKEN:[[id number]],FOLLOWUP:[1 week]],PROVIDER:[TOKEN:[[id number]],FOLLOWUP:[1 week]]

## 2025-04-25 NOTE — PHARMACOTHERAPY INTERVENTION NOTE - COMMENTS
New medications started during this admission: dapagliflozin 10 mg po daily and spironolactone 25 mg po once daily. Patient requested a medication list prior to discharge as he is unclear what medications he is to take and the frequency of his medications. I provided the patient with a list of his current medications, indication, and dose and frequency and reviewed this list with the patient. Discussed his new start medications. Also highlighted that only three of his medications- Entresto, apixaban, and metformin are to be taken twice daily; all other medications are once daily. I discussed with the patient that if he is still using Trulicity once weekly he should add this to the list once he gets home. Patient does not believe he is using this medication. Physician will send prescriptions for metformin, dapagliflozin, and spironolactone on discharge. Answered all questions and left patient with medication list. 
New start medications: dapagliflozin 10 mg po daily and spironolactone 25 mg po once daily.  Patient states he takes metformin 500 mg po BID- no recent prescriptions filled at [facility] also checked with [facility] no recent prescriptions. Patient A1c = 9.7 will send a new prescription for metformin 500 mg po BID on discharge and will need follow up with endocrine. Patient was also prescribed Trulicity 1.5 mg sq once weekly on 2/19/25 which was for a 30 day supply however patient does not endorse utilizing this medication. 
Spoke to patient to confirm admission med rec. Patient unclear of his medications, called patient's pharmacy [facility] [address] [place] [address]. Patient filled all his medications in Feb for a 90 day supply. Karen did not have any recent prescriptions for metformin, per patient he believes he still taking this. Updated dose of Tresiba from 32 units to 34 units sq once daily. Patient filled Trulicity 1.5 mg sq once weekly on 2/19/25 for a 30 day supply. I spoke to patient to ask if he still using this medication but he states he does not use any weekly injections. Per patient his diabetes regimen is metformin 500 mg po BID and Tresiba 34 units sq once daily. Removed Trulicity from admission med rec. He also previously filled a prescription for buspirone 7.5 mg po BID 2/13/25 for 30 days but per [facility] this prescription is now closed, patient does not endorse taking this.  Also called [facility] as patient states he receives medication in the mail from [facility]. No prescription history for this patient per [facility].     Home Medications:  amiodarone 200 mg oral tablet: 1 tab(s) orally once a day  apixaban 5 mg oral tablet: 1 tab(s) orally two times a day  atorvastatin 40 mg oral tablet: 1 tab(s) orally once a day (25 Apr 2025 10:24)  clopidogrel 75 mg oral tablet: 1 tab(s) orally once a day (25 Apr 2025 10:24)  eszopiclone 3 mg oral tablet: 1 tab(s) orally once a day (at bedtime) (25 Apr 2025 10:24)  ezetimibe 10 mg oral tablet: 1 orally once a day (25 Apr 2025 10:24)  Entresto 24/26 mg oral tablet: 1 tab(s) orally two times a day  famotidine 40 mg oral tablet: 1 tab(s) orally once a day (25 Apr 2025 10:14)  fenofibrate 145 mg oral tablet: 1 orally once a day (25 Apr 2025 10:24)  metFORMIN 500 mg oral tablet: 1 tab(s) orally 2 times a day (25 Apr 2025 10:24)  metoprolol succinate 50 mg oral tablet: 1 tab(s) orally once time a day  torsemide 20 mg oral tablet: 1 tab(s) orally once a day  Tresiba 100 units/mL subcutaneous solution: 34 unit(s) subcutaneous once a day (in the morning) (25 Apr 2025 10:25)

## 2025-04-25 NOTE — DISCHARGE NOTE NURSING/CASE MANAGEMENT/SOCIAL WORK - FINANCIAL ASSISTANCE
[facility] provides services at a reduced cost to those who are determined to be eligible through [facility]’s financial assistance program. Information regarding [facility]’s financial assistance program can be found by going to [email or web address] or by calling [phone].

## 2025-04-25 NOTE — PHYSICAL THERAPY INITIAL EVALUATION ADULT - GENERAL OBSERVATIONS, REHAB EVAL
09:25-09:48. Patient was seen for PT IE. Patient was rec'd in OOB in chair, +IVL, NAD, + Tele, agreeable to participate in PT. PCA present at b/s.

## 2025-04-25 NOTE — PROGRESS NOTE ADULT - ASSESSMENT
70 year old male PMH of A-fib on Eliquis with AICD, CAD, CHF, DM-2, DL, HTN presented to the ED with complaint of acute onset SOB and 10/10 chest pain for one day.  As per EMS, the pt was having runs of VT. Upon ED presentation, the pt was noted to be in sinus tachycardia rhythm and with associated hypoxia. The pt was started on BiPAP and placed on a nitroglycerin drip.       Hypertensive Emergency  Acute flash pulmonary edema / Acute HFrEF  CP likely due to #1  PAF  Chronic Leukocytosis  CAD  DM-2 / HTN / DL  Acute hypoxic respiratory failure, resolved.                 PLAN:    ·	Tele reviewed by me. On and off pacedd rhythm  ·	ECHO reviewed. EF is 30%. Mod TR. Bioprosthetic MV. Mod pulmonary HTN  ·	Care d/w the cardiology and cardiology f/u noted. No need of inpatient ischemia w/u as pt is asymptomatic. Recommended out pt f/u.   ·	Troponin noted. Elevated. Likely due to NSTEMI type 2 due to hypertensive emergency  ·	CXR noted. Decreasing b/l opacities.   ·	Pt is euvolemic now. D/C IV Bumex. Switch him to Torsemide 20 mg po daily   ·	Low salt diet and water restriction to 1.5 L/D  ·	GDMT: Cont Metoprolol Succinate 25 mg po daily, Entresto and Spironolactone.   ·	Add Dapagliflozin 10 mg po daily  ·	Restarted Eliquis 5 mg po q 12h  ·	Out pt Hem/Onc f/u for chronic leukocytosis  ·	Monitor FS. On Lantus and Lispro  ·	Cont his other meds.   ·	D/W the PT. Can be discharge home with walker  ·	D/C home      * Med rec reviewed. Plan of care d/w the pt. Time spent 41 minutes.

## 2025-04-25 NOTE — PHYSICAL THERAPY INITIAL EVALUATION ADULT - GAIT TRAINING, PT EVAL
by discharge: Pt will amb 150 ft independently using RW as needed and negotiate 7 steps Independent using handrail.

## 2025-04-28 ENCOUNTER — EMERGENCY (EMERGENCY)
Facility: HOSPITAL | Age: 70
LOS: 0 days | Discharge: LEFT BEFORE DISCHARGE | End: 2025-04-28
Attending: EMERGENCY MEDICINE
Payer: MEDICARE

## 2025-04-28 ENCOUNTER — NON-APPOINTMENT (OUTPATIENT)
Age: 70
End: 2025-04-28

## 2025-04-28 VITALS
HEIGHT: 63 IN | RESPIRATION RATE: 20 BRPM | SYSTOLIC BLOOD PRESSURE: 139 MMHG | OXYGEN SATURATION: 100 % | DIASTOLIC BLOOD PRESSURE: 83 MMHG | HEART RATE: 97 BPM | WEIGHT: 169.98 LBS | TEMPERATURE: 98 F

## 2025-04-28 DIAGNOSIS — Z95.5 PRESENCE OF CORONARY ANGIOPLASTY IMPLANT AND GRAFT: Chronic | ICD-10-CM

## 2025-04-28 DIAGNOSIS — Z88.2 ALLERGY STATUS TO SULFONAMIDES: ICD-10-CM

## 2025-04-28 DIAGNOSIS — Z98.890 OTHER SPECIFIED POSTPROCEDURAL STATES: Chronic | ICD-10-CM

## 2025-04-28 DIAGNOSIS — Z53.29 PROCEDURE AND TREATMENT NOT CARRIED OUT BECAUSE OF PATIENT'S DECISION FOR OTHER REASONS: ICD-10-CM

## 2025-04-28 DIAGNOSIS — R06.02 SHORTNESS OF BREATH: ICD-10-CM

## 2025-04-28 DIAGNOSIS — Z95.2 PRESENCE OF PROSTHETIC HEART VALVE: Chronic | ICD-10-CM

## 2025-04-28 LAB
ALBUMIN SERPL ELPH-MCNC: 3.6 G/DL — SIGNIFICANT CHANGE UP (ref 3.5–5.2)
ALP SERPL-CCNC: 87 U/L — SIGNIFICANT CHANGE UP (ref 30–115)
ALT FLD-CCNC: 13 U/L — SIGNIFICANT CHANGE UP (ref 0–41)
ANION GAP SERPL CALC-SCNC: 11 MMOL/L — SIGNIFICANT CHANGE UP (ref 7–14)
AST SERPL-CCNC: 15 U/L — SIGNIFICANT CHANGE UP (ref 0–41)
BASOPHILS # BLD AUTO: 0.11 K/UL — SIGNIFICANT CHANGE UP (ref 0–0.2)
BASOPHILS NFR BLD AUTO: 0.8 % — SIGNIFICANT CHANGE UP (ref 0–1)
BILIRUB SERPL-MCNC: 0.3 MG/DL — SIGNIFICANT CHANGE UP (ref 0.2–1.2)
BUN SERPL-MCNC: 19 MG/DL — SIGNIFICANT CHANGE UP (ref 10–20)
CALCIUM SERPL-MCNC: 9.2 MG/DL — SIGNIFICANT CHANGE UP (ref 8.4–10.5)
CHLORIDE SERPL-SCNC: 103 MMOL/L — SIGNIFICANT CHANGE UP (ref 98–110)
CO2 SERPL-SCNC: 24 MMOL/L — SIGNIFICANT CHANGE UP (ref 17–32)
CREAT SERPL-MCNC: 1.2 MG/DL — SIGNIFICANT CHANGE UP (ref 0.7–1.5)
EGFR: 65 ML/MIN/1.73M2 — SIGNIFICANT CHANGE UP
EGFR: 65 ML/MIN/1.73M2 — SIGNIFICANT CHANGE UP
EOSINOPHIL # BLD AUTO: 0.5 K/UL — SIGNIFICANT CHANGE UP (ref 0–0.7)
EOSINOPHIL NFR BLD AUTO: 3.8 % — SIGNIFICANT CHANGE UP (ref 0–8)
GAS PNL BLDV: SIGNIFICANT CHANGE UP
GLUCOSE SERPL-MCNC: 356 MG/DL — HIGH (ref 70–99)
HCT VFR BLD CALC: 36.6 % — LOW (ref 42–52)
HGB BLD-MCNC: 11.6 G/DL — LOW (ref 14–18)
IMM GRANULOCYTES NFR BLD AUTO: 0.6 % — HIGH (ref 0.1–0.3)
LYMPHOCYTES # BLD AUTO: 1.37 K/UL — SIGNIFICANT CHANGE UP (ref 1.2–3.4)
LYMPHOCYTES # BLD AUTO: 10.5 % — LOW (ref 20.5–51.1)
MCHC RBC-ENTMCNC: 27.2 PG — SIGNIFICANT CHANGE UP (ref 27–31)
MCHC RBC-ENTMCNC: 31.7 G/DL — LOW (ref 32–37)
MCV RBC AUTO: 85.7 FL — SIGNIFICANT CHANGE UP (ref 80–94)
MONOCYTES # BLD AUTO: 0.89 K/UL — HIGH (ref 0.1–0.6)
MONOCYTES NFR BLD AUTO: 6.8 % — SIGNIFICANT CHANGE UP (ref 1.7–9.3)
NEUTROPHILS # BLD AUTO: 10.13 K/UL — HIGH (ref 1.4–6.5)
NEUTROPHILS NFR BLD AUTO: 77.5 % — HIGH (ref 42.2–75.2)
NRBC BLD AUTO-RTO: 0 /100 WBCS — SIGNIFICANT CHANGE UP (ref 0–0)
PLATELET # BLD AUTO: 349 K/UL — SIGNIFICANT CHANGE UP (ref 130–400)
PMV BLD: 11.1 FL — HIGH (ref 7.4–10.4)
POTASSIUM SERPL-MCNC: 4.4 MMOL/L — SIGNIFICANT CHANGE UP (ref 3.5–5)
POTASSIUM SERPL-SCNC: 4.4 MMOL/L — SIGNIFICANT CHANGE UP (ref 3.5–5)
PROT SERPL-MCNC: 6.4 G/DL — SIGNIFICANT CHANGE UP (ref 6–8)
RBC # BLD: 4.27 M/UL — LOW (ref 4.7–6.1)
RBC # FLD: 13.5 % — SIGNIFICANT CHANGE UP (ref 11.5–14.5)
SODIUM SERPL-SCNC: 138 MMOL/L — SIGNIFICANT CHANGE UP (ref 135–146)
WBC # BLD: 13.08 K/UL — HIGH (ref 4.8–10.8)
WBC # FLD AUTO: 13.08 K/UL — HIGH (ref 4.8–10.8)

## 2025-04-28 PROCEDURE — 99283 EMERGENCY DEPT VISIT LOW MDM: CPT | Mod: 25

## 2025-04-28 PROCEDURE — 36415 COLL VENOUS BLD VENIPUNCTURE: CPT

## 2025-04-28 PROCEDURE — 36000 PLACE NEEDLE IN VEIN: CPT

## 2025-04-28 PROCEDURE — 85025 COMPLETE CBC W/AUTO DIFF WBC: CPT

## 2025-04-28 PROCEDURE — 80053 COMPREHEN METABOLIC PANEL: CPT

## 2025-04-28 PROCEDURE — 99284 EMERGENCY DEPT VISIT MOD MDM: CPT

## 2025-04-28 PROCEDURE — 71045 X-RAY EXAM CHEST 1 VIEW: CPT

## 2025-04-28 PROCEDURE — 71045 X-RAY EXAM CHEST 1 VIEW: CPT | Mod: 26

## 2025-04-28 NOTE — ED PROVIDER NOTE - OBJECTIVE STATEMENT
-70 year-old man presents to the ED for evaluation of shortness of breath.  Patient states he left his house without his portable oxygen today he needs to go to [employer] to buy supplies to fix his door.  Patient states he was walking around for too long without his oxygen and started to feel short of breath.  Staff at [employer] call [phone] patient states he immediately felt better once placed on the oxygen however EMS suggest that he come and get evaluated.  Patient states "I feel fine and I want to go home" patient denies any chest pain patient is currently on oxygen and does not feel short of breath.

## 2025-04-28 NOTE — ED PROVIDER NOTE - ATTENDING APP SHARED VISIT CONTRIBUTION OF CARE
pt with PMH as above presenting with SOB, recently discharged on home oxygen however was not given instructions on taking the O2 with him, denies chest pain, no weakness vomitting,    exam unremarkable 93 on 3L O2

## 2025-04-28 NOTE — ED PROVIDER NOTE - PROGRESS NOTE DETAILS
Patient needs to have VBG redrawn patient is refusing the redraw on the VBG Discussed with patient her x-ray today shows Some fluid.Patient states "I do not want any more test I feel fine and want to go home"Patient understands the risks.  Patient will sign out AGAINST MEDICAL ADVICE.  Patient instructed to return to the ED for any new or worsening symptoms. sarthak keita: assigned to me in error

## 2025-04-28 NOTE — ED PROVIDER NOTE - PATIENT PORTAL LINK FT
You can access the FollowMyHealth Patient Portal offered by [facility] by registering at the following website: [email or web address]. By joining [employer]’s FollowMyHealth portal, you will also be able to view your health information using other applications (apps) compatible with our system.

## 2025-04-28 NOTE — ED PROVIDER NOTE - CLINICAL SUMMARY MEDICAL DECISION MAKING FREE TEXT BOX
pt with PMH as above presenting with SOB, recently discharged on home oxygen however was not given instructions on taking the O2 with him, denies chest pain, no weakness vomitting,    exam unremarkable 93 on 3L O2. Patient has home O2 did not realize he has to leave the house with portable O2 -  on review of results - cxr with increased fluid  right side -  plan bnp  possible diuresis  - pt afebrile-Patient does not want anything else wants to go homeUnderstands he could have fluid in his lungs which can lead to intubation and death.  If untreated.     I had extensive discussion of risks and benefits of pursuing further medical evaluation and/or care with patient and any available family/friends; patient still electing to leave against medical advice. Patient is awake, alert, oriented and demonstrates full capacity and insight into illness. Patient aware and encouraged to return immediately to ED or nearest ED if patient decides to change mind regarding care or if patient experiences any new, worsening, or concerning symptoms.

## 2025-04-28 NOTE — ED ADULT TRIAGE NOTE - CHIEF COMPLAINT QUOTE
BIBA from [employer], patient c/o shortness of breath while in store. Patient has history of COPD and reports wearing 9 L NC at home

## 2025-04-29 NOTE — CHART NOTE - NSCHARTNOTESELECT_GEN_ALL_CORE
Cardiology/Event Note
Event Note
Event Note
Non-Clinical Appointment Info/Event Note
Transfer Note
Event Note

## 2025-05-05 DIAGNOSIS — R29.6 REPEATED FALLS: ICD-10-CM

## 2025-05-05 DIAGNOSIS — J96.01 ACUTE RESPIRATORY FAILURE WITH HYPOXIA: ICD-10-CM

## 2025-05-05 DIAGNOSIS — Z95.5 PRESENCE OF CORONARY ANGIOPLASTY IMPLANT AND GRAFT: ICD-10-CM

## 2025-05-05 DIAGNOSIS — Z88.1 ALLERGY STATUS TO OTHER ANTIBIOTIC AGENTS: ICD-10-CM

## 2025-05-05 DIAGNOSIS — E11.22 TYPE 2 DIABETES MELLITUS WITH DIABETIC CHRONIC KIDNEY DISEASE: ICD-10-CM

## 2025-05-05 DIAGNOSIS — R45.1 RESTLESSNESS AND AGITATION: ICD-10-CM

## 2025-05-05 DIAGNOSIS — I13.0 HYPERTENSIVE HEART AND CHRONIC KIDNEY DISEASE WITH HEART FAILURE AND STAGE 1 THROUGH STAGE 4 CHRONIC KIDNEY DISEASE, OR UNSPECIFIED CHRONIC KIDNEY DISEASE: ICD-10-CM

## 2025-05-05 DIAGNOSIS — Z79.84 LONG TERM (CURRENT) USE OF ORAL HYPOGLYCEMIC DRUGS: ICD-10-CM

## 2025-05-05 DIAGNOSIS — R65.10 SYSTEMIC INFLAMMATORY RESPONSE SYNDROME (SIRS) OF NON-INFECTIOUS ORIGIN WITHOUT ACUTE ORGAN DYSFUNCTION: ICD-10-CM

## 2025-05-05 DIAGNOSIS — J81.0 ACUTE PULMONARY EDEMA: ICD-10-CM

## 2025-05-05 DIAGNOSIS — E78.5 HYPERLIPIDEMIA, UNSPECIFIED: ICD-10-CM

## 2025-05-05 DIAGNOSIS — Z95.3 PRESENCE OF XENOGENIC HEART VALVE: ICD-10-CM

## 2025-05-05 DIAGNOSIS — J44.9 CHRONIC OBSTRUCTIVE PULMONARY DISEASE, UNSPECIFIED: ICD-10-CM

## 2025-05-05 DIAGNOSIS — I25.5 ISCHEMIC CARDIOMYOPATHY: ICD-10-CM

## 2025-05-05 DIAGNOSIS — Z45.02 ENCOUNTER FOR ADJUSTMENT AND MANAGEMENT OF AUTOMATIC IMPLANTABLE CARDIAC DEFIBRILLATOR: ICD-10-CM

## 2025-05-05 DIAGNOSIS — D84.81 IMMUNODEFICIENCY DUE TO CONDITIONS CLASSIFIED ELSEWHERE: ICD-10-CM

## 2025-05-05 DIAGNOSIS — N17.9 ACUTE KIDNEY FAILURE, UNSPECIFIED: ICD-10-CM

## 2025-05-05 DIAGNOSIS — K21.9 GASTRO-ESOPHAGEAL REFLUX DISEASE WITHOUT ESOPHAGITIS: ICD-10-CM

## 2025-05-05 DIAGNOSIS — Z79.02 LONG TERM (CURRENT) USE OF ANTITHROMBOTICS/ANTIPLATELETS: ICD-10-CM

## 2025-05-05 DIAGNOSIS — I47.20 VENTRICULAR TACHYCARDIA, UNSPECIFIED: ICD-10-CM

## 2025-05-05 DIAGNOSIS — I25.10 ATHEROSCLEROTIC HEART DISEASE OF NATIVE CORONARY ARTERY WITHOUT ANGINA PECTORIS: ICD-10-CM

## 2025-05-05 DIAGNOSIS — Z91.81 HISTORY OF FALLING: ICD-10-CM

## 2025-05-05 DIAGNOSIS — I16.1 HYPERTENSIVE EMERGENCY: ICD-10-CM

## 2025-05-05 DIAGNOSIS — F32.A DEPRESSION, UNSPECIFIED: ICD-10-CM

## 2025-05-05 DIAGNOSIS — I21.A1 MYOCARDIAL INFARCTION TYPE 2: ICD-10-CM

## 2025-05-05 DIAGNOSIS — Z79.85 LONG-TERM (CURRENT) USE OF INJECTABLE NON-INSULIN ANTIDIABETIC DRUGS: ICD-10-CM

## 2025-05-05 DIAGNOSIS — N18.30 CHRONIC KIDNEY DISEASE, STAGE 3 UNSPECIFIED: ICD-10-CM

## 2025-05-05 DIAGNOSIS — Z79.4 LONG TERM (CURRENT) USE OF INSULIN: ICD-10-CM

## 2025-05-05 DIAGNOSIS — Z87.891 PERSONAL HISTORY OF NICOTINE DEPENDENCE: ICD-10-CM

## 2025-05-05 DIAGNOSIS — Z86.73 PERSONAL HISTORY OF TRANSIENT ISCHEMIC ATTACK (TIA), AND CEREBRAL INFARCTION WITHOUT RESIDUAL DEFICITS: ICD-10-CM

## 2025-05-05 DIAGNOSIS — I48.0 PAROXYSMAL ATRIAL FIBRILLATION: ICD-10-CM

## 2025-05-05 DIAGNOSIS — E66.9 OBESITY, UNSPECIFIED: ICD-10-CM

## 2025-05-05 DIAGNOSIS — I50.23 ACUTE ON CHRONIC SYSTOLIC (CONGESTIVE) HEART FAILURE: ICD-10-CM

## 2025-05-05 DIAGNOSIS — Z79.01 LONG TERM (CURRENT) USE OF ANTICOAGULANTS: ICD-10-CM

## 2025-05-12 ENCOUNTER — INPATIENT (INPATIENT)
Facility: HOSPITAL | Age: 70
LOS: 3 days | Discharge: HOME HEALTH | DRG: 280 | End: 2025-05-16
Attending: STUDENT IN AN ORGANIZED HEALTH CARE EDUCATION/TRAINING PROGRAM | Admitting: INTERNAL MEDICINE
Payer: MEDICARE

## 2025-05-12 VITALS
RESPIRATION RATE: 22 BRPM | WEIGHT: 169.98 LBS | SYSTOLIC BLOOD PRESSURE: 150 MMHG | OXYGEN SATURATION: 98 % | HEIGHT: 63 IN | TEMPERATURE: 98 F | HEART RATE: 107 BPM | DIASTOLIC BLOOD PRESSURE: 100 MMHG

## 2025-05-12 DIAGNOSIS — Z95.5 PRESENCE OF CORONARY ANGIOPLASTY IMPLANT AND GRAFT: Chronic | ICD-10-CM

## 2025-05-12 DIAGNOSIS — Z95.2 PRESENCE OF PROSTHETIC HEART VALVE: Chronic | ICD-10-CM

## 2025-05-12 DIAGNOSIS — Z98.890 OTHER SPECIFIED POSTPROCEDURAL STATES: Chronic | ICD-10-CM

## 2025-05-12 LAB
ALBUMIN SERPL ELPH-MCNC: 3.6 G/DL — SIGNIFICANT CHANGE UP (ref 3.5–5.2)
ALP SERPL-CCNC: 102 U/L — SIGNIFICANT CHANGE UP (ref 30–115)
ALT FLD-CCNC: 10 U/L — SIGNIFICANT CHANGE UP (ref 0–41)
ANION GAP SERPL CALC-SCNC: 13 MMOL/L — SIGNIFICANT CHANGE UP (ref 7–14)
AST SERPL-CCNC: 12 U/L — SIGNIFICANT CHANGE UP (ref 0–41)
BASE EXCESS BLDV CALC-SCNC: 0.6 MMOL/L — SIGNIFICANT CHANGE UP (ref -2–3)
BASOPHILS # BLD AUTO: 0.1 K/UL — SIGNIFICANT CHANGE UP (ref 0–0.2)
BASOPHILS NFR BLD AUTO: 0.8 % — SIGNIFICANT CHANGE UP (ref 0–1)
BILIRUB SERPL-MCNC: 0.2 MG/DL — SIGNIFICANT CHANGE UP (ref 0.2–1.2)
BUN SERPL-MCNC: 17 MG/DL — SIGNIFICANT CHANGE UP (ref 10–20)
CA-I SERPL-SCNC: 1.23 MMOL/L — SIGNIFICANT CHANGE UP (ref 1.15–1.33)
CALCIUM SERPL-MCNC: 9.3 MG/DL — SIGNIFICANT CHANGE UP (ref 8.4–10.5)
CHLORIDE SERPL-SCNC: 103 MMOL/L — SIGNIFICANT CHANGE UP (ref 98–110)
CO2 SERPL-SCNC: 21 MMOL/L — SIGNIFICANT CHANGE UP (ref 17–32)
CREAT SERPL-MCNC: 1.3 MG/DL — SIGNIFICANT CHANGE UP (ref 0.7–1.5)
EGFR: 59 ML/MIN/1.73M2 — LOW
EGFR: 59 ML/MIN/1.73M2 — LOW
EOSINOPHIL # BLD AUTO: 0.38 K/UL — SIGNIFICANT CHANGE UP (ref 0–0.7)
EOSINOPHIL NFR BLD AUTO: 2.9 % — SIGNIFICANT CHANGE UP (ref 0–8)
GAS PNL BLDV: 135 MMOL/L — LOW (ref 136–145)
GAS PNL BLDV: SIGNIFICANT CHANGE UP
HCO3 BLDV-SCNC: 27 MMOL/L — SIGNIFICANT CHANGE UP (ref 22–29)
HCT VFR BLD CALC: 37.8 % — LOW (ref 42–52)
HGB BLD-MCNC: 11.6 G/DL — LOW (ref 14–18)
IMM GRANULOCYTES NFR BLD AUTO: 0.8 % — HIGH (ref 0.1–0.3)
LACTATE BLDV-MCNC: 2.5 MMOL/L — HIGH (ref 0.5–2)
LYMPHOCYTES # BLD AUTO: 1.42 K/UL — SIGNIFICANT CHANGE UP (ref 1.2–3.4)
LYMPHOCYTES # BLD AUTO: 11 % — LOW (ref 20.5–51.1)
MAGNESIUM SERPL-MCNC: 1.9 MG/DL — SIGNIFICANT CHANGE UP (ref 1.8–2.4)
MCHC RBC-ENTMCNC: 26.2 PG — LOW (ref 27–31)
MCHC RBC-ENTMCNC: 30.7 G/DL — LOW (ref 32–37)
MCV RBC AUTO: 85.5 FL — SIGNIFICANT CHANGE UP (ref 80–94)
MONOCYTES # BLD AUTO: 0.76 K/UL — HIGH (ref 0.1–0.6)
MONOCYTES NFR BLD AUTO: 5.9 % — SIGNIFICANT CHANGE UP (ref 1.7–9.3)
NEUTROPHILS # BLD AUTO: 10.16 K/UL — HIGH (ref 1.4–6.5)
NEUTROPHILS NFR BLD AUTO: 78.6 % — HIGH (ref 42.2–75.2)
NRBC BLD AUTO-RTO: 0 /100 WBCS — SIGNIFICANT CHANGE UP (ref 0–0)
NT-PROBNP SERPL-SCNC: 1362 PG/ML — HIGH (ref 0–300)
PCO2 BLDV: 52 MMHG — SIGNIFICANT CHANGE UP (ref 42–55)
PH BLDV: 7.33 — SIGNIFICANT CHANGE UP (ref 7.32–7.43)
PLATELET # BLD AUTO: 383 K/UL — SIGNIFICANT CHANGE UP (ref 130–400)
PMV BLD: 10.3 FL — SIGNIFICANT CHANGE UP (ref 7.4–10.4)
PO2 BLDV: 20 MMHG — LOW (ref 25–45)
POTASSIUM BLDV-SCNC: 4.8 MMOL/L — SIGNIFICANT CHANGE UP (ref 3.5–5.1)
POTASSIUM SERPL-MCNC: 4.6 MMOL/L — SIGNIFICANT CHANGE UP (ref 3.5–5)
POTASSIUM SERPL-SCNC: 4.6 MMOL/L — SIGNIFICANT CHANGE UP (ref 3.5–5)
PROT SERPL-MCNC: 6.8 G/DL — SIGNIFICANT CHANGE UP (ref 6–8)
RBC # BLD: 4.42 M/UL — LOW (ref 4.7–6.1)
RBC # FLD: 13.8 % — SIGNIFICANT CHANGE UP (ref 11.5–14.5)
SAO2 % BLDV: 25.4 % — LOW (ref 67–88)
SODIUM SERPL-SCNC: 137 MMOL/L — SIGNIFICANT CHANGE UP (ref 135–146)
TROPONIN T, HIGH SENSITIVITY RESULT: 74 NG/L — CRITICAL HIGH (ref 6–21)
WBC # BLD: 12.92 K/UL — HIGH (ref 4.8–10.8)
WBC # FLD AUTO: 12.92 K/UL — HIGH (ref 4.8–10.8)

## 2025-05-12 PROCEDURE — 99291 CRITICAL CARE FIRST HOUR: CPT

## 2025-05-12 PROCEDURE — 71045 X-RAY EXAM CHEST 1 VIEW: CPT | Mod: 26

## 2025-05-12 PROCEDURE — 93010 ELECTROCARDIOGRAM REPORT: CPT

## 2025-05-12 NOTE — ED ADULT TRIAGE NOTE - MODE OF ARRIVAL
Health Maintenance Due   Topic Date Due    Hepatitis B Vaccine (1 of 3 - 3-dose series) Never done    Pneumococcal Vaccine 0-64 (1 of 2 - PCV) Never done    DTaP/Tdap/Td Vaccine (1 - Tdap) Never done    Influenza Vaccine (1) Never done    COVID-19 Vaccine (2 - 2023-24 season) 09/01/2023       Patient is due for topics as listed above but is not proceeding with any of the above at this time.      Ambulance EMS

## 2025-05-12 NOTE — ED ADULT TRIAGE NOTE - CHIEF COMPLAINT QUOTE
Pt BIBA, picked up for chest pain and SOB. Pt has bilateral rales for lung sounds. Pt placed on CPAP in the bus satting at 98% in triage

## 2025-05-12 NOTE — ED ADULT TRIAGE NOTE - GLASGOW COMA SCALE: BEST [employer]OR RESPONSE, MLM
Faxed received regarding methyldopa (ALDOMET) 500 MG that prior authorization is needed. PA is sent.    (M6) obeys commands

## 2025-05-12 NOTE — ED ADULT NURSE NOTE - NSFALLHARMRISKINTERV_ED_ALL_ED
Assistance OOB with selected safe patient handling equipment if applicable/Assistance with ambulation/Communicate risk of Fall with Harm to all staff, patient, and family/Encourage patient to sit up slowly, dangle for a short time, stand at bedside before walking/Monitor gait and stability/Monitor for mental status changes and reorient to person, place, and time, as needed/Move patient closer to nursing station/within visual sight of ED staff/Provide patient with walking aids/Provide visual cue: red socks, yellow wristband, yellow gown, etc/Reinforce activity limits and safety measures with patient and family/Review medications for side effects contributing to fall risk/Toileting schedule using arm’s reach rule for commode and bathroom/Use of alarms - bed, stretcher, chair and/or video monitoring/Bed in lowest position, wheels locked, appropriate side rails in place/Call bell, personal items and telephone in reach/Instruct patient to call for assistance before getting out of bed/chair/stretcher/Non-slip footwear applied when patient is off stretcher/[place] to call system/Physically safe environment - no spills, clutter or unnecessary equipment/Purposeful Proactive Rounding/Room/bathroom lighting operational, light cord in reach

## 2025-05-13 DIAGNOSIS — J81.0 ACUTE PULMONARY EDEMA: ICD-10-CM

## 2025-05-13 LAB
ANION GAP SERPL CALC-SCNC: 12 MMOL/L — SIGNIFICANT CHANGE UP (ref 7–14)
BUN SERPL-MCNC: 17 MG/DL — SIGNIFICANT CHANGE UP (ref 10–20)
CALCIUM SERPL-MCNC: 9.5 MG/DL — SIGNIFICANT CHANGE UP (ref 8.4–10.5)
CHLORIDE SERPL-SCNC: 103 MMOL/L — SIGNIFICANT CHANGE UP (ref 98–110)
CK MB CFR SERPL CALC: 6.1 NG/ML — SIGNIFICANT CHANGE UP (ref 0.6–6.3)
CK MB CFR SERPL CALC: 6.2 NG/ML — SIGNIFICANT CHANGE UP (ref 0.6–6.3)
CK SERPL-CCNC: 124 U/L — SIGNIFICANT CHANGE UP (ref 0–225)
CK SERPL-CCNC: 125 U/L — SIGNIFICANT CHANGE UP (ref 0–225)
CO2 SERPL-SCNC: 25 MMOL/L — SIGNIFICANT CHANGE UP (ref 17–32)
CREAT SERPL-MCNC: 1.2 MG/DL — SIGNIFICANT CHANGE UP (ref 0.7–1.5)
EGFR: 65 ML/MIN/1.73M2 — SIGNIFICANT CHANGE UP
EGFR: 65 ML/MIN/1.73M2 — SIGNIFICANT CHANGE UP
FLUAV AG NPH QL: SIGNIFICANT CHANGE UP
FLUBV AG NPH QL: SIGNIFICANT CHANGE UP
GLUCOSE BLDC GLUCOMTR-MCNC: 183 MG/DL — HIGH (ref 70–99)
GLUCOSE BLDC GLUCOMTR-MCNC: 209 MG/DL — HIGH (ref 70–99)
GLUCOSE BLDC GLUCOMTR-MCNC: 237 MG/DL — HIGH (ref 70–99)
GLUCOSE SERPL-MCNC: 226 MG/DL — HIGH (ref 70–99)
GLUCOSE SERPL-MCNC: 313 MG/DL — HIGH (ref 70–99)
HCT VFR BLD CALC: 40 % — LOW (ref 42–52)
HGB BLD-MCNC: 12.5 G/DL — LOW (ref 14–18)
MAGNESIUM SERPL-MCNC: 1.7 MG/DL — LOW (ref 1.8–2.4)
MCHC RBC-ENTMCNC: 26.4 PG — LOW (ref 27–31)
MCHC RBC-ENTMCNC: 31.3 G/DL — LOW (ref 32–37)
MCV RBC AUTO: 84.6 FL — SIGNIFICANT CHANGE UP (ref 80–94)
NRBC BLD AUTO-RTO: 0 /100 WBCS — SIGNIFICANT CHANGE UP (ref 0–0)
PHOSPHATE SERPL-MCNC: 3.8 MG/DL — SIGNIFICANT CHANGE UP (ref 2.1–4.9)
PLATELET # BLD AUTO: 389 K/UL — SIGNIFICANT CHANGE UP (ref 130–400)
PMV BLD: 10.5 FL — HIGH (ref 7.4–10.4)
POTASSIUM SERPL-MCNC: 4.3 MMOL/L — SIGNIFICANT CHANGE UP (ref 3.5–5)
POTASSIUM SERPL-SCNC: 4.3 MMOL/L — SIGNIFICANT CHANGE UP (ref 3.5–5)
RBC # BLD: 4.73 M/UL — SIGNIFICANT CHANGE UP (ref 4.7–6.1)
RBC # FLD: 13.8 % — SIGNIFICANT CHANGE UP (ref 11.5–14.5)
RSV RNA NPH QL NAA+NON-PROBE: SIGNIFICANT CHANGE UP
SARS-COV-2 RNA SPEC QL NAA+PROBE: SIGNIFICANT CHANGE UP
SODIUM SERPL-SCNC: 140 MMOL/L — SIGNIFICANT CHANGE UP (ref 135–146)
SOURCE RESPIRATORY: SIGNIFICANT CHANGE UP
TROPONIN T, HIGH SENSITIVITY RESULT: 100 NG/L — CRITICAL HIGH (ref 6–21)
TROPONIN T, HIGH SENSITIVITY RESULT: 88 NG/L — CRITICAL HIGH (ref 6–21)
TROPONIN T, HIGH SENSITIVITY RESULT: 95 NG/L — CRITICAL HIGH (ref 6–21)
WBC # BLD: 15.13 K/UL — HIGH (ref 4.8–10.8)
WBC # FLD AUTO: 15.13 K/UL — HIGH (ref 4.8–10.8)

## 2025-05-13 PROCEDURE — 84484 ASSAY OF TROPONIN QUANT: CPT

## 2025-05-13 PROCEDURE — 82553 CREATINE MB FRACTION: CPT

## 2025-05-13 PROCEDURE — 82962 GLUCOSE BLOOD TEST: CPT

## 2025-05-13 PROCEDURE — 83735 ASSAY OF MAGNESIUM: CPT

## 2025-05-13 PROCEDURE — 99223 1ST HOSP IP/OBS HIGH 75: CPT

## 2025-05-13 PROCEDURE — 93010 ELECTROCARDIOGRAM REPORT: CPT

## 2025-05-13 PROCEDURE — 82550 ASSAY OF CK (CPK): CPT

## 2025-05-13 PROCEDURE — 87635 SARS-COV-2 COVID-19 AMP PRB: CPT

## 2025-05-13 PROCEDURE — 80048 BASIC METABOLIC PNL TOTAL CA: CPT

## 2025-05-13 PROCEDURE — 36415 COLL VENOUS BLD VENIPUNCTURE: CPT

## 2025-05-13 PROCEDURE — 84100 ASSAY OF PHOSPHORUS: CPT

## 2025-05-13 PROCEDURE — 93005 ELECTROCARDIOGRAM TRACING: CPT

## 2025-05-13 PROCEDURE — 85027 COMPLETE CBC AUTOMATED: CPT

## 2025-05-13 PROCEDURE — 97162 PT EVAL MOD COMPLEX 30 MIN: CPT | Mod: GP

## 2025-05-13 RX ORDER — DEXTROSE 50 % IN WATER 50 %
25 SYRINGE (ML) INTRAVENOUS ONCE
Refills: 0 | Status: DISCONTINUED | OUTPATIENT
Start: 2025-05-13 | End: 2025-05-16

## 2025-05-13 RX ORDER — GLUCAGON 3 MG/1
1 POWDER NASAL ONCE
Refills: 0 | Status: DISCONTINUED | OUTPATIENT
Start: 2025-05-13 | End: 2025-05-16

## 2025-05-13 RX ORDER — SPIRONOLACTONE 25 MG
25 TABLET ORAL EVERY 24 HOURS
Refills: 0 | Status: DISCONTINUED | OUTPATIENT
Start: 2025-05-13 | End: 2025-05-16

## 2025-05-13 RX ORDER — FUROSEMIDE 10 MG/ML
40 INJECTION INTRAMUSCULAR; INTRAVENOUS
Refills: 0 | Status: DISCONTINUED | OUTPATIENT
Start: 2025-05-13 | End: 2025-05-13

## 2025-05-13 RX ORDER — APIXABAN 2.5 MG/1
5 TABLET, FILM COATED ORAL
Refills: 0 | Status: DISCONTINUED | OUTPATIENT
Start: 2025-05-13 | End: 2025-05-16

## 2025-05-13 RX ORDER — FUROSEMIDE 10 MG/ML
60 INJECTION INTRAMUSCULAR; INTRAVENOUS ONCE
Refills: 0 | Status: COMPLETED | OUTPATIENT
Start: 2025-05-13 | End: 2025-05-13

## 2025-05-13 RX ORDER — INSULIN LISPRO 100 U/ML
INJECTION, SOLUTION INTRAVENOUS; SUBCUTANEOUS
Refills: 0 | Status: DISCONTINUED | OUTPATIENT
Start: 2025-05-13 | End: 2025-05-16

## 2025-05-13 RX ORDER — DAPAGLIFLOZIN 5 MG/1
10 TABLET, FILM COATED ORAL DAILY
Refills: 0 | Status: DISCONTINUED | OUTPATIENT
Start: 2025-05-13 | End: 2025-05-16

## 2025-05-13 RX ORDER — INSULIN GLARGINE-YFGN 100 [IU]/ML
32 INJECTION, SOLUTION SUBCUTANEOUS EVERY MORNING
Refills: 0 | Status: DISCONTINUED | OUTPATIENT
Start: 2025-05-13 | End: 2025-05-16

## 2025-05-13 RX ORDER — METOPROLOL SUCCINATE 50 MG/1
25 TABLET, EXTENDED RELEASE ORAL DAILY
Refills: 0 | Status: DISCONTINUED | OUTPATIENT
Start: 2025-05-13 | End: 2025-05-15

## 2025-05-13 RX ORDER — DEXTROMETHORPHAN HBR, GUAIFENESIN 200 MG/10ML
600 LIQUID ORAL EVERY 12 HOURS
Refills: 0 | Status: DISCONTINUED | OUTPATIENT
Start: 2025-05-13 | End: 2025-05-16

## 2025-05-13 RX ORDER — FUROSEMIDE 10 MG/ML
40 INJECTION INTRAMUSCULAR; INTRAVENOUS ONCE
Refills: 0 | Status: COMPLETED | OUTPATIENT
Start: 2025-05-13 | End: 2025-05-13

## 2025-05-13 RX ORDER — TORSEMIDE 10 MG
20 TABLET ORAL DAILY
Refills: 0 | Status: DISCONTINUED | OUTPATIENT
Start: 2025-05-13 | End: 2025-05-14

## 2025-05-13 RX ORDER — ATORVASTATIN CALCIUM 80 MG/1
40 TABLET, FILM COATED ORAL AT BEDTIME
Refills: 0 | Status: DISCONTINUED | OUTPATIENT
Start: 2025-05-13 | End: 2025-05-16

## 2025-05-13 RX ORDER — CLOPIDOGREL BISULFATE 75 MG/1
75 TABLET, FILM COATED ORAL DAILY
Refills: 0 | Status: DISCONTINUED | OUTPATIENT
Start: 2025-05-13 | End: 2025-05-16

## 2025-05-13 RX ORDER — SACUBITRIL AND VALSARTAN 6; 6 MG/1; MG/1
1 PELLET ORAL
Refills: 0 | Status: DISCONTINUED | OUTPATIENT
Start: 2025-05-13 | End: 2025-05-16

## 2025-05-13 RX ORDER — SODIUM CHLORIDE 9 G/1000ML
1000 INJECTION, SOLUTION INTRAVENOUS
Refills: 0 | Status: DISCONTINUED | OUTPATIENT
Start: 2025-05-13 | End: 2025-05-16

## 2025-05-13 RX ORDER — DEXTROSE 50 % IN WATER 50 %
15 SYRINGE (ML) INTRAVENOUS ONCE
Refills: 0 | Status: DISCONTINUED | OUTPATIENT
Start: 2025-05-13 | End: 2025-05-16

## 2025-05-13 RX ORDER — AMIODARONE HYDROCHLORIDE 50 MG/ML
200 INJECTION, SOLUTION INTRAVENOUS DAILY
Refills: 0 | Status: DISCONTINUED | OUTPATIENT
Start: 2025-05-13 | End: 2025-05-16

## 2025-05-13 RX ORDER — INSULIN LISPRO 100 U/ML
INJECTION, SOLUTION INTRAVENOUS; SUBCUTANEOUS AT BEDTIME
Refills: 0 | Status: DISCONTINUED | OUTPATIENT
Start: 2025-05-13 | End: 2025-05-16

## 2025-05-13 RX ORDER — FENOFIBRATE 160 MG/1
145 TABLET ORAL DAILY
Refills: 0 | Status: DISCONTINUED | OUTPATIENT
Start: 2025-05-13 | End: 2025-05-16

## 2025-05-13 RX ORDER — EZETIMIBE 10 MG/1
10 TABLET ORAL DAILY
Refills: 0 | Status: DISCONTINUED | OUTPATIENT
Start: 2025-05-13 | End: 2025-05-16

## 2025-05-13 RX ORDER — DEXTROSE 50 % IN WATER 50 %
12.5 SYRINGE (ML) INTRAVENOUS ONCE
Refills: 0 | Status: DISCONTINUED | OUTPATIENT
Start: 2025-05-13 | End: 2025-05-16

## 2025-05-13 RX ORDER — MAGNESIUM SULFATE 500 MG/ML
2 SYRINGE (ML) INJECTION
Refills: 0 | Status: COMPLETED | OUTPATIENT
Start: 2025-05-13 | End: 2025-05-13

## 2025-05-13 RX ADMIN — FUROSEMIDE 40 MILLIGRAM(S): 10 INJECTION INTRAMUSCULAR; INTRAVENOUS at 11:55

## 2025-05-13 RX ADMIN — Medication 25 GRAM(S): at 12:09

## 2025-05-13 RX ADMIN — AMIODARONE HYDROCHLORIDE 200 MILLIGRAM(S): 50 INJECTION, SOLUTION INTRAVENOUS at 06:34

## 2025-05-13 RX ADMIN — SACUBITRIL AND VALSARTAN 1 TABLET(S): 6; 6 PELLET ORAL at 17:27

## 2025-05-13 RX ADMIN — Medication 25 MILLIGRAM(S): at 06:34

## 2025-05-13 RX ADMIN — APIXABAN 5 MILLIGRAM(S): 2.5 TABLET, FILM COATED ORAL at 06:41

## 2025-05-13 RX ADMIN — METOPROLOL SUCCINATE 25 MILLIGRAM(S): 50 TABLET, EXTENDED RELEASE ORAL at 06:35

## 2025-05-13 RX ADMIN — Medication 20 MILLIGRAM(S): at 11:56

## 2025-05-13 RX ADMIN — APIXABAN 5 MILLIGRAM(S): 2.5 TABLET, FILM COATED ORAL at 17:27

## 2025-05-13 RX ADMIN — EZETIMIBE 10 MILLIGRAM(S): 10 TABLET ORAL at 11:56

## 2025-05-13 RX ADMIN — Medication 20 MILLIGRAM(S): at 06:36

## 2025-05-13 RX ADMIN — ATORVASTATIN CALCIUM 40 MILLIGRAM(S): 80 TABLET, FILM COATED ORAL at 19:48

## 2025-05-13 RX ADMIN — SACUBITRIL AND VALSARTAN 1 TABLET(S): 6; 6 PELLET ORAL at 06:36

## 2025-05-13 RX ADMIN — FUROSEMIDE 60 MILLIGRAM(S): 10 INJECTION INTRAMUSCULAR; INTRAVENOUS at 01:00

## 2025-05-13 RX ADMIN — DAPAGLIFLOZIN 10 MILLIGRAM(S): 5 TABLET, FILM COATED ORAL at 11:56

## 2025-05-13 RX ADMIN — CLOPIDOGREL BISULFATE 75 MILLIGRAM(S): 75 TABLET, FILM COATED ORAL at 11:56

## 2025-05-13 RX ADMIN — DEXTROMETHORPHAN HBR, GUAIFENESIN 600 MILLIGRAM(S): 200 LIQUID ORAL at 19:48

## 2025-05-13 RX ADMIN — Medication 25 GRAM(S): at 15:38

## 2025-05-13 RX ADMIN — Medication 25 GRAM(S): at 17:27

## 2025-05-13 RX ADMIN — FENOFIBRATE 145 MILLIGRAM(S): 160 TABLET ORAL at 11:56

## 2025-05-13 NOTE — H&P ADULT - HISTORY OF PRESENT ILLNESS
·Patient is a  70 year old male with past medical history of  COPD (on 2L home O2), diabetes HFrEF 43%, ischemic cardiomyopathy s/p CRT-D, (Entresto, spironolactone 25, torsemide  20mg AVR, hypertension, paroxysmal A-fib (status post ablation 1/14/25) on AMiodarone, Eliquis  , CAD s/p PCI (2019), , hyperlipidemia, chronic leukocytosis recent admission 4/19/25-4/25/25 forNSTEMI,  fluid overload  -  comes to the emergency room for chest pain that states felt like pressure across his chest followed by shortness of breath which worsened over the next  3 hours. Pt on EMS arrival pt hypoxic in the 80s on 2L, increased to 89 on NC,  and improved with cpap and Lasix.      ·Patient is a  70 year old male with past medical history of  COPD (on 2L home O2), diabetes HFrEF 43%, ischemic cardiomyopathy s/p CRT-D, (Entresto, spironolactone 25, torsemide  20mg AVR, hypertension, paroxysmal A-fib (status post ablation 1/14/25) on amiodarone Eliquis  , CAD s/p PCI (2019), , hyperlipidemia, chronic leukocytosis recent admission 4/19/25-4/25/25 forNSTEMI,  fluid overload  -  comes to the emergency room for chest pain that states felt like pressure across his chest followed by shortness of breath which worsened over the next  3 hours. Pt on EMS arrival pt hypoxic in the 80s on 2L, increased to 89 on NC,  and improved with cpap and Lasix.. Patient claims stress at home brought on current episode

## 2025-05-13 NOTE — CONSULT NOTE ADULT - NS ATTEND AMEND GEN_ALL_CORE FT
Presents with SOB    SOB: Multifactorial:  Acute decompensated S-CHF with mild lower extremity edema.   Continue mild diuresis today. If looks improved tomorrow, can switch to home meds.   He states he orders take out daily as this is the likely cause of his readmission.   Increase home dose of Torsemide 20 mg po bid.     COPD: Continue home O2.     Will follow.

## 2025-05-13 NOTE — CONSULT NOTE ADULT - ASSESSMENT
70 year old male with past medical history of  COPD (on 2L home O2), diabetes HFrEF 43%, ischemic cardiomyopathy s/p CRT-D, (Entresto, spironolactone 25, torsemide  20mg AVR, hypertension, paroxysmal A-fib (status post ablation 1/14/25) on amiodarone Eliquis, CAD s/p PCI (2019), hyperlipidemia, chronic leukocytosis recent admission 4/19/25-4/25/25 for NSTEMI, and fluid overload  -  presents to the hospital for sudden onset SOB and chest dismcofort      Impression  #SOB: Multifactorial   #HFrEF (EF 30%, s/p CRT-D)  #CAD s/p PCI to RCA and LAD  #HTN/HLD/DM  #Paroxysmal AFib on Eliquis  #COPD        Plan:  - Pt currently denies CP, SOB  - Clinically does not appear in ADHF  - Trops elevated possibly 2/2 to demand in the setting of Hypoxia. Higher in the past. Cont trending till peak  - Continue with current GDMT for HFrEF  - Cont Eliquis for Afib  - Monitor lytes

## 2025-05-13 NOTE — H&P ADULT - ASSESSMENT
Acute hypoxic respiratory failure    DM Acute hypoxic respiratory failure due to pulmonary edema     DM Acute hypoxic respiratory failure due to pulmonary edema / history of CHF    DM    CAD with stents, elevated troponin - suspect demand ischemia    COPD    A Fib    Hypertension    GERD     history of CVA    Dyslipidemia     Old records reviewed    Need to clarify advance directives with patient when able (has indicated wish to be DNR to staff)  Acute hypoxic respiratory failure due to pulmonary edema / history of CHF (HFrEF with AICD in place) - Feels much better, will resume usual meds including oral diuretic)    DM type 2    CAD with stents, elevated troponin - suspect demand ischemia    COPD    A Fib (PAF)    Hypertension    GERD     history of CVA    Dyslipidemia     Old records reviewed    Need to clarify advance directives with patient when able (has indicated wish to be DNR to staff)  Acute hypoxic respiratory failure due to pulmonary edema / history of CHF (HFrEF with AICD in place) - Feels much better, will resume usual meds including oral diuretic    DM type 2 - for now use insulin, hold other diabetic meds used at home     CAD with stents, elevated troponin - suspect demand ischemia - cardiology consult, repeat troponin in am    COPD - noted    A Fib (PAF) - DOAC, amiodarone, metoprolol     Hypertension - monitor on current meds    GERD  - on pepcid at home    history of CVA - noted     Dyslipidemia - continue usual meds     Old records reviewed    Need to clarify advance directives with patient when able (has indicated wish to be DNR to staff)

## 2025-05-13 NOTE — PHYSICAL THERAPY INITIAL EVALUATION ADULT - STANDING BALANCE: STATIC
Physical Therapy Daily Treatment Note     Name: Rob Lewis [facility] Number: [id number]    Therapy Diagnosis:   Encounter Diagnoses   Name Primary?    Neck pain on right side     Bad posture      Physician: Federico Pina*    Visit Date: 3/6/2019    Physician Orders: PT Eval and Treat   Medical Diagnosis from Referral: M54.2 (ICD-10-CM) - Neck pain  Evaluation Date: 2/18/2019  Authorization Period Expiration: 2/28/2019  Plan of Care Expiration: 5/18/2019  Visit # / Visits authorized: 3/ 5    Time In: 1205  Time Out: 1255  Total Billable Time: 50 minutes    Precautions: Standard    Subjective     Pt reports: no new complaints following previous session.    She was compliant with home exercise program.  Response to previous treatment: See above  Functional change:  None    Pain: 3/10  Location: right neck      Objective     BOLD = Performed Today  + = New Exercise or Progression    Rob received therapeutic exercises to develop strength, endurance, ROM, flexibility and posture for 30 minutes including:     Exercise Resistance Sets/Reps/Hold    UBE  Next visit    Chin Tucks w/ ANA towel  20x 5 secs    UT, Lev Scap Stretches      S/L Thoracic ROT stretch  15x B    Scaption 2lbs 3x 10    TB Row Green 3x 10   + Supine HABD w/ TB on 1/2 FR Red 3x 10                   Rob received the following manual therapy techniques: Joint mobilizations, Manual traction, Myofacial release and Soft tissue Mobilization were applied to the: cervical musculature for 25 minutes, including IASTM.      Rob received hot pack for 10 minutes to cervical spine.    Home Exercises Provided and Patient Education Provided     Education provided:   - Exercise progressions, postural correction    Written Home Exercises Provided: Patient instructed to cont prior HEP.  Exercises were reviewed and Rob was able to demonstrate them prior to the end of the session.  Rob demonstrated good  understanding of  the education provided.     See EMR under Patient Instructions for exercises provided prior visit.    Assessment       Rob is progressing well towards her goals.   She again tolerated all exercise progressions well, demonstrating good neuromuscular control over scapular movements and cervical positioning.  She reported decreased tension following manual treatment.  IASTM was performed to upper trap, lev scap, and suboccipital musculature for desensitization.  Increased tone was present on R UT and Lev scap compared to L side.    Pt prognosis is Excellent.     Pt will continue to benefit from skilled outpatient physical therapy to address the deficits listed in the problem list box on initial evaluation, provide pt/family education and to maximize pt's level of independence in the home and community environment.     Pt's spiritual, cultural and educational needs considered and pt agreeable to plan of care and goals.     Anticipated barriers to physical therapy: None    Goals:  Short Term GOALS: 2 weeks. Pt agrees with goals set.  1. Patient demonstrates independence with HEP.   2. Patient demonstrates independence with Postural Awareness.   3. Patient demonstrates independence with body mechanics.   4. Patient will report pain of 2/10 at worst, on 0-10 pain scale, with all activity     Long Term GOALS: 4 weeks. Pt agrees with goals set.  1. Patient reports sleeping through the night without limitations d/t cervical pain for at least 7 consecutive days.    2. Patient demonstrates ability to lift at least 25lbs with good mechanics and no pain in cervical region.    3. Patient reports ability to participate in all prayer activities without limitations d/t cervical pain.  4. Patient will report pain of 1/10 at worst, on 0-10 pain scale, with all activity      Plan     Assess response, continue w/ POC.      Brad Singletary, PT        with rolling walker/fair minus

## 2025-05-13 NOTE — PATIENT PROFILE ADULT - FALL HARM RISK - HARM RISK INTERVENTIONS

## 2025-05-13 NOTE — PHYSICAL THERAPY INITIAL EVALUATION ADULT - ADDITIONAL COMMENTS
Per patient and wife, there are 8 steps outside with 2 rails and 13 steps inside with (R) rail going up and chairlift on other side; has home O2

## 2025-05-13 NOTE — PHYSICAL THERAPY INITIAL EVALUATION ADULT - GENERAL OBSERVATIONS, REHAB EVAL
14:30-14:55 Chart reviewed. Pt encountered semireclined in bed, may be seen by Physical Therapist as confirmed with Nurse. Patient denied pain and ready to get up now; +tele/ IV lock BUR/ O2 via NC/ wife at bedside

## 2025-05-13 NOTE — CONSULT NOTE ADULT - SUBJECTIVE AND OBJECTIVE BOX
HPI:    ·Patient is a  70 year old male with past medical history of  COPD (on 2L home O2), diabetes HFrEF 43%, ischemic cardiomyopathy s/p CRT-D, (Entresto, spironolactone 25, torsemide  20mg AVR, hypertension, paroxysmal A-fib (status post ablation 1/14/25) on amiodarone Eliquis  , CAD s/p PCI (2019), , hyperlipidemia, chronic leukocytosis recent admission 4/19/25-4/25/25 forNSTEMI,  fluid overload  -  comes to the emergency room for chest pain that states felt like pressure across his chest followed by shortness of breath which worsened over the next  3 hours. Pt on EMS arrival pt hypoxic in the 80s on 2L, increased to 89 on NC, and improved with cpap and Lasix.. Patient claims stress at home brought on current episode    (13 May 2025 04:06)        HPI-Cardiology   Pt with the above Hx and HPI, evaluated at bedside. Pt presented for eval of sudden onset SOB associated with chest discomfort. Pt endorses similar episodes in the past, and currently endorses that he does not feel short of breath and is CP free. Denies any other cardiac related symptoms. Radiology tests and hospital records, were reviewed, as well as previous notes on this patient.      PAST MEDICAL & SURGICAL HISTORY  CHF (congestive heart failure)    Diabetes    CAD (coronary artery disease)    Chronic obstructive pulmonary disease (COPD)    HTN (hypertension)    Stented coronary artery    Dyslipidemia    GERD (gastroesophageal reflux disease)    Afib    CVA (cerebral vascular accident)    H/O heart artery stent    Heart valve replaced  aortic    History of Nissen fundoplication        FAMILY HISTORY:  FAMILY HISTORY:      SOCIAL HISTORY:  []smoker  []Alcohol  []Drug    ALLERGIES:  Bactrim (Unknown)      MEDICATIONS:  MEDICATIONS  (STANDING):  aMIOdarone    Tablet 200 milliGRAM(s) Oral daily  apixaban 5 milliGRAM(s) Oral two times a day  atorvastatin 40 milliGRAM(s) Oral at bedtime  chlorhexidine 2% Cloths 1 Application(s) Topical <User Schedule>  clopidogrel Tablet 75 milliGRAM(s) Oral daily  dapagliflozin 10 milliGRAM(s) Oral daily  dextrose 5%. 1000 milliLiter(s) (100 mL/Hr) IV Continuous <Continuous>  dextrose 5%. 1000 milliLiter(s) (50 mL/Hr) IV Continuous <Continuous>  dextrose 50% Injectable 25 Gram(s) IV Push once  dextrose 50% Injectable 12.5 Gram(s) IV Push once  dextrose 50% Injectable 25 Gram(s) IV Push once  ezetimibe 10 milliGRAM(s) Oral daily  famotidine    Tablet 20 milliGRAM(s) Oral daily  fenofibrate Tablet 145 milliGRAM(s) Oral daily  glucagon  Injectable 1 milliGRAM(s) IntraMuscular once  insulin glargine Injectable (LANTUS) 32 Unit(s) SubCutaneous every morning  insulin lispro (ADMELOG) corrective regimen sliding scale   SubCutaneous three times a day before meals  insulin lispro (ADMELOG) corrective regimen sliding scale   SubCutaneous at bedtime  metoprolol succinate ER 25 milliGRAM(s) Oral daily  sacubitril 24 mG/valsartan 26 mG 1 Tablet(s) Oral two times a day  spironolactone 25 milliGRAM(s) Oral every 24 hours  torsemide 20 milliGRAM(s) Oral daily    MEDICATIONS  (PRN):  dextrose Oral Gel 15 Gram(s) Oral once PRN Blood Glucose LESS THAN 70 milliGRAM(s)/deciliter  zolpidem 5 milliGRAM(s) Oral at bedtime PRN Insomnia      HOME MEDICATIONS:  Home Medications:  atorvastatin 40 mg oral tablet: 1 tab(s) orally once a day (25 Apr 2025 10:24)  clopidogrel 75 mg oral tablet: 1 tab(s) orally once a day (25 Apr 2025 10:24)  eszopiclone 3 mg oral tablet: 1 tab(s) orally once a day (at bedtime) (25 Apr 2025 10:24)  ezetimibe 10 mg oral tablet: 1 orally once a day (25 Apr 2025 10:24)  famotidine 40 mg oral tablet: 1 tab(s) orally once a day (25 Apr 2025 14:42)  fenofibrate 145 mg oral tablet: 1 orally once a day (25 Apr 2025 10:24)  metFORMIN 500 mg oral tablet: 1 tab(s) orally 2 times a day (25 Apr 2025 11:22)  spironolactone 25 mg oral tablet: 1 tab(s) orally every 24 hours (25 Apr 2025 10:24)  Tresiba 100 units/mL subcutaneous solution: 34 unit(s) subcutaneous once a day (in the morning) (25 Apr 2025 10:25)      VITALS:   T(F): 98 (05-13 @ 05:29), Max: 98 (05-12 @ 22:52)  HR: 96 (05-13 @ 05:29) (96 - 107)  BP: 165/65 (05-13 @ 05:29) (142/77 - 165/65)  BP(mean): 119 (05-13 @ 05:29) (119 - 119)  RR: 18 (05-13 @ 05:29) (18 - 22)  SpO2: 93% (05-13 @ 05:29) (93% - 100%)    I&O's Summary      REVIEW OF SYSTEMS:  CONSTITUTIONAL: No weakness, fevers or chills  EYES: No visual changes  ENT: No vertigo or throat pain   NECK: No pain or stiffness  RESPIRATORY: No cough, wheezing, hemoptysis; No shortness of breath  CARDIOVASCULAR: No chest pain or palpitations  GASTROINTESTINAL: No abdominal or epigastric pain. No nausea, vomiting, or hematemesis; No diarrhea or constipation. No melena or hematochezia.  GENITOURINARY: No dysuria, frequency or hematuria  NEUROLOGICAL: No numbness or weakness  SKIN: No itching, no rashes  MSK: no    PHYSICAL EXAM:  NEURO: patient is awake , alert and oriented  GEN: Not in acute distress  NECK: no thyroid enlargement, no JVD  LUNGS: Clear to auscultation bilaterally   CARDIOVASCULAR: S1/S2 present, RRR , no murmurs or rubs, no carotid bruits,  + PP bilaterally  ABD: Soft, non-tender, non-distended, +BS  EXT: No JUAN  SKIN: Intact    LABS:                        11.6   12.92 )-----------( 383      ( 12 May 2025 23:01 )             37.8     05-12    137  |  103  |  17  ----------------------------<  313[H]  4.6   |  21  |  1.3    Ca    9.3      12 May 2025 23:01  Mg     1.9     05-12    TPro  6.8  /  Alb  3.6  /  TBili  0.2  /  DBili  x   /  AST  12  /  ALT  10  /  AlkPhos  102  05-12              Troponin trend:      04-19 Chol 209[H] LDL -- HDL 34[L] Trig 116      RADIOLOGY:  -CXR:  -TTE:  -CCTA:  -STRESS TEST:  -CATHETERIZATION:    ECG:    TELEMETRY EVENTS:   HPI:    ·Patient is a  70 year old male with past medical history of  COPD (on 2L home O2), diabetes HFrEF 43%, ischemic cardiomyopathy s/p CRT-D, (Entresto, spironolactone 25, torsemide  20mg AVR, hypertension, paroxysmal A-fib (status post ablation 1/14/25) on amiodarone Eliquis  , CAD s/p PCI (2019), , hyperlipidemia, chronic leukocytosis recent admission 4/19/25-4/25/25 forNSTEMI,  fluid overload  -  comes to the emergency room for chest pain that states felt like pressure across his chest followed by shortness of breath which worsened over the next  3 hours. Pt on EMS arrival pt hypoxic in the 80s on 2L, increased to 89 on NC, and improved with cpap and Lasix.. Patient claims stress at home brought on current episode    (13 May 2025 04:06)        HPI-Cardiology   Pt with the above Hx and HPI, evaluated at bedside. Pt presented for eval of sudden onset SOB associated with chest discomfort. Pt endorses similar episodes in the past, and currently endorses that he does not feel short of breath and is CP free. Denies any other cardiac related symptoms. Radiology tests and hospital records, were reviewed, as well as previous notes on this patient.      PAST MEDICAL & SURGICAL HISTORY  CHF (congestive heart failure)    Diabetes    CAD (coronary artery disease)    Chronic obstructive pulmonary disease (COPD)    HTN (hypertension)    Stented coronary artery    Dyslipidemia    GERD (gastroesophageal reflux disease)    Afib    CVA (cerebral vascular accident)    H/O heart artery stent    Heart valve replaced  aortic    History of Nissen fundoplication            ALLERGIES:  Bactrim (Unknown)      MEDICATIONS:  MEDICATIONS  (STANDING):  aMIOdarone    Tablet 200 milliGRAM(s) Oral daily  apixaban 5 milliGRAM(s) Oral two times a day  atorvastatin 40 milliGRAM(s) Oral at bedtime  chlorhexidine 2% Cloths 1 Application(s) Topical <User Schedule>  clopidogrel Tablet 75 milliGRAM(s) Oral daily  dapagliflozin 10 milliGRAM(s) Oral daily  dextrose 5%. 1000 milliLiter(s) (100 mL/Hr) IV Continuous <Continuous>  dextrose 5%. 1000 milliLiter(s) (50 mL/Hr) IV Continuous <Continuous>  dextrose 50% Injectable 25 Gram(s) IV Push once  dextrose 50% Injectable 12.5 Gram(s) IV Push once  dextrose 50% Injectable 25 Gram(s) IV Push once  ezetimibe 10 milliGRAM(s) Oral daily  famotidine    Tablet 20 milliGRAM(s) Oral daily  fenofibrate Tablet 145 milliGRAM(s) Oral daily  glucagon  Injectable 1 milliGRAM(s) IntraMuscular once  insulin glargine Injectable (LANTUS) 32 Unit(s) SubCutaneous every morning  insulin lispro (ADMELOG) corrective regimen sliding scale   SubCutaneous three times a day before meals  insulin lispro (ADMELOG) corrective regimen sliding scale   SubCutaneous at bedtime  metoprolol succinate ER 25 milliGRAM(s) Oral daily  sacubitril 24 mG/valsartan 26 mG 1 Tablet(s) Oral two times a day  spironolactone 25 milliGRAM(s) Oral every 24 hours  torsemide 20 milliGRAM(s) Oral daily    MEDICATIONS  (PRN):  dextrose Oral Gel 15 Gram(s) Oral once PRN Blood Glucose LESS THAN 70 milliGRAM(s)/deciliter  zolpidem 5 milliGRAM(s) Oral at bedtime PRN Insomnia      HOME MEDICATIONS:  Home Medications:  atorvastatin 40 mg oral tablet: 1 tab(s) orally once a day (25 Apr 2025 10:24)  clopidogrel 75 mg oral tablet: 1 tab(s) orally once a day (25 Apr 2025 10:24)  eszopiclone 3 mg oral tablet: 1 tab(s) orally once a day (at bedtime) (25 Apr 2025 10:24)  ezetimibe 10 mg oral tablet: 1 orally once a day (25 Apr 2025 10:24)  famotidine 40 mg oral tablet: 1 tab(s) orally once a day (25 Apr 2025 14:42)  fenofibrate 145 mg oral tablet: 1 orally once a day (25 Apr 2025 10:24)  metFORMIN 500 mg oral tablet: 1 tab(s) orally 2 times a day (25 Apr 2025 11:22)  spironolactone 25 mg oral tablet: 1 tab(s) orally every 24 hours (25 Apr 2025 10:24)  Tresiba 100 units/mL subcutaneous solution: 34 unit(s) subcutaneous once a day (in the morning) (25 Apr 2025 10:25)      VITALS:   T(F): 98 (05-13 @ 05:29), Max: 98 (05-12 @ 22:52)  HR: 96 (05-13 @ 05:29) (96 - 107)  BP: 165/65 (05-13 @ 05:29) (142/77 - 165/65)  BP(mean): 119 (05-13 @ 05:29) (119 - 119)  RR: 18 (05-13 @ 05:29) (18 - 22)  SpO2: 93% (05-13 @ 05:29) (93% - 100%)          REVIEW OF SYSTEMS:  See HPI      PHYSICAL EXAM:  NEURO: patient is awake , alert and oriented  GEN: Not in acute distress  NECK: no thyroid enlargement, no JVD  LUNGS: Decreased at bases to auscultation bilaterally   CARDIOVASCULAR: S1/S2 present, RRR , no murmurs or rubs, no carotid bruits,  + PP bilaterally  ABD: Soft, non-tender, non-distended, +BS  EXT: No JUAN  SKIN: Intact      LABS:                        11.6   12.92 )-----------( 383      ( 12 May 2025 23:01 )             37.8     05-12    137  |  103  |  17  ----------------------------<  313[H]  4.6   |  21  |  1.3    Ca    9.3      12 May 2025 23:01  Mg     1.9     05-12    TPro  6.8  /  Alb  3.6  /  TBili  0.2  /  DBili  x   /  AST  12  /  ALT  10  /  AlkPhos  102  05-12          04-19 Chol 209[H] LDL -- HDL 34[L] Trig 116      RADIOLOGY:  -CXR:  < from: TTE Echo Complete w/o Contrast w/ Doppler (04.20.25 @ 13:14) >    Summary:   1. LV Ejection Fraction by Beck's Method with a biplane EF of 30 %.   2. Severely decreased global left ventricular systolic function.   3. Endocardial visualization was enhanced with intravenous echo contrast.   4. Left atrial enlargement.   5. Mildly reduced RV systolic function.   6. Mild mitral valve regurgitation.   7. Thickening of the anterior and posterior mitral valve leaflets.   8. Moderate tricuspid regurgitation.   9. Bioprosthetic aortic valve in place. Peak/mean [id number]/15.3   mmHg.  10. Estimated pulmonary artery systolic pressure is 59.9 mmHg assuming a   right atrial pressure of 3 mmHg, which is consistent with moderate   pulmonary hypertension.  11. Likely PFO noted by color doppler wtih left to right shunt.    < end of copied text >        ECG:  < from: 12 Lead ECG (05.13.25 @ 07:50) >  AV dual-paced rhythm  Biventricular pacemaker detected  Abnormal ECG    Confirmed by Suleman Bhatt ([id number]) on 5/13/2025 8:03:45 AM    < end of copied text >

## 2025-05-13 NOTE — PHYSICAL THERAPY INITIAL EVALUATION ADULT - LEVEL OF INDEPENDENCE: STAIR NEGOTIATION, REHAB EVAL
unable to perform secondary to fatigue/SOB after ambulating on level with rolling walker at this time

## 2025-05-13 NOTE — ED PROVIDER NOTE - PHYSICAL EXAMINATION
Physical Exam    Vital Signs: I have reviewed the initial vital signs.  Constitutional: mod acute distress on cpap  Eyes: Conjunctiva pink, Sclera clear, PERRLA, EOMI.  Cardiovascular: S1 and S2, regular rate, regular rhythm, well-perfused extremities, radial pulses equal and 2+  Respiratory: + labored respiratory effort, decrease b/l with rales  Gastrointestinal: soft, non-tender abdomen, no pulsatile mass, normal bowl sounds  Musculoskeletal: supple neck, no lower extremity edema, no midline tenderness  Integumentary: warm, dry, no rash  Neurologic: awake, alert, cranial nerves II-XII grossly intact, extremities’ motor and sensory functions grossly intact  Psychiatric: appropriate mood, appropriate affect

## 2025-05-13 NOTE — PHYSICAL THERAPY INITIAL EVALUATION ADULT - IMPAIRMENTS CONTRIBUTING TO GAIT DEVIATIONS, PT EVAL
decreased endurance unable to perform secondary to difficulty ambulating on level with rolling walker at this time/impaired balance/narrow base of support/impaired postural control/decreased strength

## 2025-05-13 NOTE — H&P ADULT - NSHPLABSRESULTS_GEN_ALL_CORE
11.6   12.92 )-----------( 383      ( 12 May 2025 23:01 )             37.8     05-12    137  |  103  |  17  ----------------------------<  313[H]  4.6   |  21  |  1.3    Ca    9.3      12 May 2025 23:01  Mg     1.9     05-12    TPro  6.8  /  Alb  3.6  /  TBili  0.2  /  DBili  x   /  AST  12  /  ALT  10  /  AlkPhos  102  05-12          Urinalysis Basic - ( 12 May 2025 23:01 )    Color: x / Appearance: x / SG: x / pH: x  Gluc: 313 mg/dL / Ketone: x  / Bili: x / Urobili: x   Blood: x / Protein: x / Nitrite: x   Leuk Esterase: x / RBC: x / WBC x   Sq Epi: x / Non Sq Epi: x / Bacteria: x    TROPONIN - 11.6   12.92 )-----------( 383      ( 12 May 2025 23:01 )             37.8     05-12    137  |  103  |  17  ----------------------------<  313[H]  4.6   |  21  |  1.3    Ca    9.3      12 May 2025 23:01  Mg     1.9     05-12    TPro  6.8  /  Alb  3.6  /  TBili  0.2  /  DBili  x   /  AST  12  /  ALT  10  /  AlkPhos  102  05-12          Urinalysis Basic - ( 12 May 2025 23:01 )    Color: x / Appearance: x / SG: x / pH: x  Gluc: 313 mg/dL / Ketone: x  / Bili: x / Urobili: x   Blood: x / Protein: x / Nitrite: x   Leuk Esterase: x / RBC: x / WBC x   Sq Epi: x / Non Sq Epi: x / Bacteria: x    TROPONIN - 74, 95    CXR to me is vascular congestion 11.6   12.92 )-----------( 383      ( 12 May 2025 23:01 )             37.8     05-12    137  |  103  |  17  ----------------------------<  313[H]  4.6   |  21  |  1.3    Ca    9.3      12 May 2025 23:01  Mg     1.9     05-12    TPro  6.8  /  Alb  3.6  /  TBili  0.2  /  DBili  x   /  AST  12  /  ALT  10  /  AlkPhos  102  05-12          Urinalysis Basic - ( 12 May 2025 23:01 )    Color: x / Appearance: x / SG: x / pH: x  Gluc: 313 mg/dL / Ketone: x  / Bili: x / Urobili: x   Blood: x / Protein: x / Nitrite: x   Leuk Esterase: x / RBC: x / WBC x   Sq Epi: x / Non Sq Epi: x / Bacteria: x    TROPONIN - 74, 95    CXR to me is vascular congestion with device

## 2025-05-13 NOTE — ED PROVIDER NOTE - CLINICAL SUMMARY MEDICAL DECISION MAKING FREE TEXT BOX
71 y/o male with past medical history of  COPD (on 2L home O2), diabetes HFrEF 43%, ischemic cardiomyopathy s/p CRT-D, (ENtresto, spironolactone 25, torsemide  20mg AVR, hypertension, paroxysmal A-fib (status post ablation 1/14/25) on AMiodarone, Eliquis  , CAD s/p PCI (2019), , hyperlipidemia, chronic leukocytosis recent admission 4/19/25-4/25/25 forNSTEMI,  fluid overload  -  presents today with SOB  CP over past 3 hours  on  EMS arrival pt hypoxic in the 80s on 2L, increased to 89 on NC,  and improved with cpap. in ED pt  nontoxic   placed in BIPAP, rales bl  bedside sono,  b line bl,  bl pleural effusions,  pedal edema  labs reviewed increased trop  74 ( decreased from previous -  around 150), paced  rhythm ,  no sgarbossa,  elevated BNP 1300   crea 1.3,  60mg IV lasix given   Pt weaned from BIPAP to NC well appearing no distress

## 2025-05-13 NOTE — ED PROVIDER NOTE - IN ACCORDANCE WITH [place] STATE LAW, WE OFFER EVERY PATIENT A HEPATITIS C TEST. WOULD YOU LIKE TO BE TESTED TODAY?
Patient reports syncopal episode yesterday hit right side of head and lip, abrasion and swelling to right upper lip observed. Denies chest pain, SOB, cough, nausea or vomiting, fevers or chills or sick contacts. Patient hx vertigo, HTN, denies use of blood thinners. Opt out

## 2025-05-13 NOTE — PHYSICAL THERAPY INITIAL EVALUATION ADULT - GAIT DEVIATIONS NOTED, PT EVAL
stooped posture, dec heel strike/pushoff/decreased fifi/decreased step length/decreased weight-shifting ability

## 2025-05-13 NOTE — ED PROVIDER NOTE - OBJECTIVE STATEMENT
70 year old male with past medical history of  COPD (on 2L home O2), diabetes HFrEF 43%, ischemic cardiomyopathy s/p CRT-D, (Entresto, spironolactone 25, torsemide  20mg AVR, hypertension, paroxysmal A-fib (status post ablation 1/14/25) on AMiodarone, Eliquis  , CAD s/p PCI (2019), , hyperlipidemia, chronic leukocytosis recent admission 4/19/25-4/25/25 forNSTEMI,  fluid overload  -  comes to the emergency room for chest pain that states felt like pressure across his chest followed by shortness of breath which worsened over the next  3 hours. Pt on EMS arrival pt hypoxic in the 80s on 2L, increased to 89 on NC,  and improved with cpap.

## 2025-05-13 NOTE — ED PROVIDER NOTE - PRINCIPAL DIAGNOSIS
Addended by: EVELIO RUSSELL on: 3/21/2022 04:54 PM     Modules accepted: Orders, SmartSet     Acute pulmonary edema

## 2025-05-13 NOTE — PHYSICAL THERAPY INITIAL EVALUATION ADULT - PERTINENT HX OF CURRENT PROBLEM, REHAB EVAL
71 y/o male admitted with diagnosis of Acute pulmonary edema, recently admitted 4/19-4/25/25 for NSTEMI/ fluid overload, came back to ED for chest pain with shortness of breath, was hypoxic in ED, improved with CPAP and Lasix, seen by Cardiology

## 2025-05-13 NOTE — ED ADULT NURSE REASSESSMENT NOTE - NS ED NURSE REASSESS COMMENT FT1
pt refusing fingerstick and insulin all morning despite education on risks. TOM Plasencia made aware.

## 2025-05-13 NOTE — PATIENT PROFILE ADULT - LAST TOBACCO USE (DD-MM-YY)
HISTORY OF PRESENT ILLNESS  Filiberto Rice is a 76 y.o. female. Valvular Heart Disease   This is a chronic problem. The current episode started more than 1 week ago. The problem occurs daily. The problem has been gradually worsening. Associated symptoms include shortness of breath. Pertinent negatives include no chest pain. Shortness of Breath   The history is provided by the patient. This is a chronic problem. The problem occurs intermittently. The current episode started more than 1 week ago. The problem has not changed since onset. Pertinent negatives include no fever, no ear pain, no neck pain, no cough, no sputum production, no hemoptysis, no wheezing, no PND, no orthopnea, no chest pain, no syncope, no vomiting, no rash, no leg pain, no leg swelling and no claudication. Associated medical issues do not include CAD or heart failure. Review of Systems   Constitutional: Positive for malaise/fatigue. Negative for chills, diaphoresis, fever and weight loss. HENT: Negative for ear discharge, ear pain, hearing loss, nosebleeds and tinnitus. Eyes: Negative for blurred vision. Respiratory: Positive for shortness of breath. Negative for cough, hemoptysis, sputum production, wheezing and stridor. Cardiovascular: Negative for chest pain, palpitations, orthopnea, claudication, leg swelling, syncope and PND. Gastrointestinal: Negative for heartburn, nausea and vomiting. Musculoskeletal: Negative for myalgias and neck pain. Skin: Negative for itching and rash. Neurological: Negative for dizziness, tingling, tremors, focal weakness, loss of consciousness and weakness. Psychiatric/Behavioral: Negative for depression and suicidal ideas.      Family History   Problem Relation Age of Onset    Cancer Other     Heart Disease Other     Cancer Mother     Heart Disease Mother        Past Medical History:   Diagnosis Date    Arthritis     Heart murmur     History of seasonal allergies     HTN (hypertension)     Hypercholesteremia        Past Surgical History:   Procedure Laterality Date    HX KNEE ARTHROSCOPY      left and right    HX ORTHOPAEDIC      right ankle       Social History   Substance Use Topics    Smoking status: Never Smoker    Smokeless tobacco: Never Used    Alcohol use No       No Known Allergies    Outpatient Prescriptions Marked as Taking for the 9/12/17 encounter (Office Visit) with Jono Lackey MD   Medication Sig Dispense Refill    simvastatin (ZOCOR) 80 mg tablet Take 1 Tab by mouth nightly. 80 mg 30 Tab 2    metoprolol succinate (TOPROL-XL) 100 mg tablet Take 1 Tab by mouth two (2) times a day. 60 Tab 3    meloxicam (MOBIC) 15 mg tablet TAKE 1 TABLET DAILY WITH FOOD 90 Tab 2    losartan (COZAAR) 50 mg tablet Take  by mouth daily.  montelukast (SINGULAIR) 10 mg tablet Take 10 mg by mouth daily.  cyanocobalamin (VITAMIN B-12) 500 mcg tablet Take 500 mcg by mouth daily.  folic acid (FOLVITE) 1 mg tablet Take  by mouth daily.  hydrochlorothiazide (HYDRODIURIL) 25 mg tablet Take 25 mg by mouth daily.  CETIRIZINE HCL (ZYRTEC PO) Take  by mouth. Visit Vitals    /54    Pulse (!) 58    Ht 5' 1\" (1.549 m)    Wt 81.2 kg (179 lb)    BMI 33.82 kg/m2     Physical Exam   Constitutional: She is oriented to person, place, and time. She appears well-developed and well-nourished. No distress. HENT:   Head: Atraumatic. Mouth/Throat: No oropharyngeal exudate. Eyes: Conjunctivae are normal. Right eye exhibits no discharge. Left eye exhibits no discharge. No scleral icterus. Neck: Normal range of motion. Neck supple. No JVD present. No tracheal deviation present. No thyromegaly present. Cardiovascular: Normal rate and regular rhythm. Exam reveals no gallop. Murmur: 3/6 holosystolic murmur best heard at apex and left parasternal area with no radiationl. Pulmonary/Chest: Effort normal and breath sounds normal. No stridor.  No respiratory distress. She has no wheezes. She has no rales. She exhibits no tenderness. Abdominal: Soft. There is no tenderness. There is no rebound and no guarding. Musculoskeletal: Normal range of motion. She exhibits no edema or tenderness. Lymphadenopathy:     She has no cervical adenopathy. Neurological: She is alert and oriented to person, place, and time. She exhibits normal muscle tone. Skin: Skin is warm. She is not diaphoretic. Psychiatric: She has a normal mood and affect. Her behavior is normal.     ekg sinus bradycardia with poor r wave progression. Echo report reviewed. ASSESSMENT and PLAN    ICD-10-CM ICD-9-CM    1. Mild HOCM (hypertrophic obstructive cardiomyopathy) (HCC) I42.1 425.11    2. Nonrheumatic aortic valve insufficiency I35.1 424.1 simvastatin (ZOCOR) 80 mg tablet   3. Non-rheumatic mitral regurgitation I34.0 424.0    4. Pulmonary HTN (HCC) I27.2 416.8     severe   5. Papillary thyroid carcinoma ([facility] [address] C73 193    6. SOB (shortness of breath) R06.02 786.05      Orders Placed This Encounter    simvastatin (ZOCOR) 80 mg tablet     Sig: Take 1 Tab by mouth nightly. 80 mg     Dispense:  30 Tab     Refill:  2     Follow-up Disposition:  Return in about 3 months (around 12/12/2017). current treatment plan is effective, no change in therapy  reviewed diet, exercise and weight control  use of aspirin to prevent MI and TIA's discussed. Patient seen for pre op evaluation prior to thyroid surgery for possible Ca. Had recent echo which revealed severe pulm htn. Has mild dyspnea with no signs of fluid overload. Recent ANAND reveals HOCM with mitral regurgitation and moderate aortic regurgitation. RHC shows severe Pulm HTN with elevated LVEDP. She had an appointment with Dr Aracelis Jj at [facility]- however could go due to her 's medical condition. She will try to keep her appointment on the 18th.   Meanwhile continue current meds 03-Feb-2025

## 2025-05-14 ENCOUNTER — TRANSCRIPTION ENCOUNTER (OUTPATIENT)
Age: 70
End: 2025-05-14

## 2025-05-14 DIAGNOSIS — F41.9 ANXIETY DISORDER, UNSPECIFIED: ICD-10-CM

## 2025-05-14 PROCEDURE — 90792 PSYCH DIAG EVAL W/MED SRVCS: CPT | Mod: 2W

## 2025-05-14 PROCEDURE — 99232 SBSQ HOSP IP/OBS MODERATE 35: CPT

## 2025-05-14 RX ORDER — MELATONIN 5 MG
5 TABLET ORAL AT BEDTIME
Refills: 0 | Status: DISCONTINUED | OUTPATIENT
Start: 2025-05-14 | End: 2025-05-16

## 2025-05-14 RX ORDER — LORAZEPAM 4 MG/ML
2 VIAL (ML) INJECTION EVERY 6 HOURS
Refills: 0 | Status: DISCONTINUED | OUTPATIENT
Start: 2025-05-14 | End: 2025-05-16

## 2025-05-14 RX ORDER — TORSEMIDE 10 MG
20 TABLET ORAL EVERY 12 HOURS
Refills: 0 | Status: DISCONTINUED | OUTPATIENT
Start: 2025-05-14 | End: 2025-05-16

## 2025-05-14 RX ORDER — DIPHENHYDRAMINE HCL 12.5MG/5ML
50 ELIXIR ORAL EVERY 6 HOURS
Refills: 0 | Status: DISCONTINUED | OUTPATIENT
Start: 2025-05-14 | End: 2025-05-16

## 2025-05-14 RX ORDER — LORAZEPAM 4 MG/ML
2 VIAL (ML) INJECTION ONCE
Refills: 0 | Status: DISCONTINUED | OUTPATIENT
Start: 2025-05-14 | End: 2025-05-14

## 2025-05-14 RX ORDER — HALOPERIDOL 10 MG/1
5 TABLET ORAL EVERY 6 HOURS
Refills: 0 | Status: DISCONTINUED | OUTPATIENT
Start: 2025-05-14 | End: 2025-05-16

## 2025-05-14 RX ADMIN — Medication 25 MILLIGRAM(S): at 05:06

## 2025-05-14 RX ADMIN — Medication 2 MILLIGRAM(S): at 08:54

## 2025-05-14 RX ADMIN — INSULIN LISPRO 1: 100 INJECTION, SOLUTION INTRAVENOUS; SUBCUTANEOUS at 16:36

## 2025-05-14 RX ADMIN — SACUBITRIL AND VALSARTAN 1 TABLET(S): 6; 6 PELLET ORAL at 18:00

## 2025-05-14 RX ADMIN — DAPAGLIFLOZIN 10 MILLIGRAM(S): 5 TABLET, FILM COATED ORAL at 12:11

## 2025-05-14 RX ADMIN — APIXABAN 5 MILLIGRAM(S): 2.5 TABLET, FILM COATED ORAL at 18:00

## 2025-05-14 RX ADMIN — SACUBITRIL AND VALSARTAN 1 TABLET(S): 6; 6 PELLET ORAL at 05:04

## 2025-05-14 RX ADMIN — Medication 20 MILLIGRAM(S): at 05:05

## 2025-05-14 RX ADMIN — METOPROLOL SUCCINATE 25 MILLIGRAM(S): 50 TABLET, EXTENDED RELEASE ORAL at 05:04

## 2025-05-14 RX ADMIN — APIXABAN 5 MILLIGRAM(S): 2.5 TABLET, FILM COATED ORAL at 05:04

## 2025-05-14 RX ADMIN — Medication 20 MILLIGRAM(S): at 12:11

## 2025-05-14 RX ADMIN — Medication 20 MILLIGRAM(S): at 18:00

## 2025-05-14 RX ADMIN — ATORVASTATIN CALCIUM 40 MILLIGRAM(S): 80 TABLET, FILM COATED ORAL at 21:09

## 2025-05-14 RX ADMIN — Medication 50 MILLIGRAM(S): at 21:10

## 2025-05-14 RX ADMIN — Medication 5 MILLIGRAM(S): at 21:09

## 2025-05-14 RX ADMIN — AMIODARONE HYDROCHLORIDE 200 MILLIGRAM(S): 50 INJECTION, SOLUTION INTRAVENOUS at 05:04

## 2025-05-14 RX ADMIN — Medication 1 APPLICATION(S): at 05:04

## 2025-05-14 RX ADMIN — FENOFIBRATE 145 MILLIGRAM(S): 160 TABLET ORAL at 12:11

## 2025-05-14 RX ADMIN — INSULIN LISPRO 2: 100 INJECTION, SOLUTION INTRAVENOUS; SUBCUTANEOUS at 12:11

## 2025-05-14 RX ADMIN — CLOPIDOGREL BISULFATE 75 MILLIGRAM(S): 75 TABLET, FILM COATED ORAL at 12:11

## 2025-05-14 RX ADMIN — EZETIMIBE 10 MILLIGRAM(S): 10 TABLET ORAL at 12:11

## 2025-05-14 NOTE — BH CONSULTATION LIAISON ASSESSMENT NOTE - NSBHCHARTREVIEWVS_PSY_A_CORE FT
Vital Signs Last 24 Hrs  T(C): 36.4 (14 May 2025 05:01), Max: 36.7 (13 May 2025 20:40)  T(F): 97.6 (14 May 2025 05:01), Max: 98.1 (13 May 2025 20:40)  HR: 72 (14 May 2025 05:01) (72 - 95)  BP: 125/74 (14 May 2025 05:01) (115/70 - 174/89)  BP(mean): --  RR: 18 (14 May 2025 05:01) (18 - 18)  SpO2: 98% (14 May 2025 05:01) (93% - 98%)    Parameters below as of 13 May 2025 18:27  Patient On (Oxygen Delivery Method): nasal cannula  O2 Flow (L/min): 3

## 2025-05-14 NOTE — BH CONSULTATION LIAISON ASSESSMENT NOTE - PAST PSYCHOTROPIC MEDICATION
As per chart , it seem that patient has been on psychiatric medication in the past , unclear which medications

## 2025-05-14 NOTE — BH CONSULTATION LIAISON ASSESSMENT NOTE - SUMMARY
Mr Frey is a 70 year old man with a history of Depression and PTSD who was admitted to the medical floor for the management of chest pain and CHF exacerbation. Psychiatry consult was called for suicidal ideations . According to the medical team, patient wanted to leave against medical advice yesterday night but became agitated , required PRN medication to calm down an physical restraints  and also became agitated this morning and made a suicidal statement .     At this time, patient is considered a danger to himself and others and needs inpatient psychiatric hospitalization for medication management and symptoms stabilization.   Mr Frey is a 70 year old man with a history of Depression and an unclear diagnosis of PTSD who was admitted to the medical floor for the management of chest pain and CHF exacerbation. Psychiatry consult was called for suicidal ideations . According to the medical team, patient wanted to leave against medical advice yesterday night but became agitated , required PRN medication to calm down an physical restraints  and also became agitated this morning and made a suicidal statement .     At this time, patient is considered a danger to himself and others and needs inpatient psychiatric hospitalization for medication management and symptoms stabilization.   Mr Frey is a 70 year old man with a history of Depression and an unclear diagnosis of PTSD who was admitted to the medical floor for the management of chest pain and CHF exacerbation. Psychiatry consult was called for suicidal ideations . According to the medical team, patient wanted to leave against medical advice yesterday night but became agitated , required PRN medication to calm down an physical restraints  and also became agitated this morning and made a suicidal statement .   Patient appears to have significant mood lability likely in the context of his desire to be discharged home. However , considering his current presentation, the suicidal statement with a specific plan, past suicidal statements made , apparent impulsivity  , history of non compliance with psychiatric and psychotherapy treatment , and feelings of demoralization that can be inferred from chart review , patient appears to have significant depressed mood that is impinging on his functioning .   He however does not appear to have overt psychotic or manic symptoms .   At this time, patient is considered a danger to himself and others and needs inpatient psychiatric hospitalization for medication management and symptoms stabilization upon medical clearance and bed availability.

## 2025-05-14 NOTE — DISCHARGE NOTE PROVIDER - HOSPITAL COURSE
Acute hypoxic respiratory failure due to pulmonary edema / history of CHF (HFrEF with AICD in place) -  DM type 2 - for now use insulin, hold other diabetic meds used at home     CAD with stents, elevated troponin - suspect demand ischemia - cardiology consult, repeat troponin in am    COPD - noted    A Fib (PAF) - DOAC, amiodarone, metoprolol     Hypertension - monitor on current meds    GERD  - on pepcid at home    history of CVA - noted     Dyslipidemia - continue usual meds     Patient leaving hospital AMA, risk of CHF, MI explained in detail such as heart attack, respiratory failure,  and death Pt a&ox4 understands and accepts risk. Adamant about leaving hospital. Patient is a  70 year old male with past medical history of  COPD (on 2L home O2), diabetes HFrEF 43%, ischemic cardiomyopathy s/p CRT-D, (Entresto, spironolactone 25, torsemide  20mg AVR, hypertension, paroxysmal A-fib (status post ablation 1/14/25) on amiodarone Eliquis  , CAD s/p PCI (2019), , hyperlipidemia, chronic leukocytosis recent admission 4/19/25-4/25/25 forNSTEMI,  fluid overload  -  comes to the emergency room for chest pain that states felt like pressure across his chest followed by shortness of breath which worsened over the next  3 hours. Pt on EMS arrival pt hypoxic in the 80s on 2L, increased to 89 on NC,  and improved with cpap and Lasix.. Patient claims stress at home brought on current episode     AHRF requiring NC  -on 2L NC wean as tolerated  -in setting of likely mild HFrEF exacerbation, exacerbated by increased salt intake from takeout  -resolved, on RA 5/15    mild HFrEF exacerbation  -increase torsemide to 20 mg BID as consulted by cardiology  -PVCs present, on metoprlol succinate 25 daily, will increase to 50 daily  -pt appears euvolemic   -on GDMT    hx of a fib   -continue eliquis   -amiodarone    suicidal ideation  -pt stating he wanted to leave AMA from the hospital on 5/14 and [facility] and has also made several insinuations while admitted that he would end his life at home  -psych consult placed on 5/14, appreciate evaluation, they deem pt requiring IPP    DM2  HLD  -farxiga  -zetia  -fenofibrate    demand ischemia  hx of iscehmic cardiomyopathy, s.p CRT-D  CAD s/p PIC 2019  -troponins elevated lkely in setting of hypoxia   -troponin downtrending   -on plavix   -continue entresto/metoprolol GDMT    dvt ppx - eliquis  pending: stable for admission to [facility] 5/16     pt seen and examined on the day of discharge  Vitals: HD stable, O2 stable  Gen: appropriate affect, no acute distress  Neuro: no focal defects, no sensory deficits  HEENT: EOMI, no JVD, normocephalic, atraumatic, no scleral icterus  Cardio: RRR, S1 S2 present, no murmurs, rubs, or gallops  Resp: lungs b/l CTA, chest wall intact  Abd: soft, nondistended, nontender  MSK: no gross joint abnormalities, no obvious swelling  Skin: no rashes, no wounds  heme: no ecchymosis or petechiae     Pt was stable for DC. Pt was given strict instructions and return precautions. pt was instructed to return to the ER if symptoms worsen at any time and to return to the ER at any time for any other medical concern. Pt was counseled on continuing home medications and new prescriptions.     I have spent more than 30 minutes of time on the discharge encounter which excludes separately reported services    please note that this is a summary of the medical record. please refer to the EMR for further details. Patient is a  70 year old male with past medical history of  COPD (on 2L home O2), diabetes HFrEF 43%, ischemic cardiomyopathy s/p CRT-D, (Entresto, spironolactone 25, torsemide  20mg AVR, hypertension, paroxysmal A-fib (status post ablation 1/14/25) on amiodarone Eliquis  , CAD s/p PCI (2019), , hyperlipidemia, chronic leukocytosis recent admission 4/19/25-4/25/25 forNSTEMI,  fluid overload  -  comes to the emergency room for chest pain that states felt like pressure across his chest followed by shortness of breath which worsened over the next  3 hours. Pt on EMS arrival pt hypoxic in the 80s on 2L, increased to 89 on NC,  and improved with cpap and Lasix.. Patient claims stress at home brought on current episode     AHRF requiring NC  -on 2L NC wean as tolerated  -in setting of likely mild HFrEF exacerbation, exacerbated by increased salt intake from takeout  -resolved, on RA 5/15    mild HFrEF exacerbation  -increase torsemide to 20 mg BID as consulted by cardiology  -PVCs present, on metoprlol succinate 25 daily, will increase to 50 daily  -pt appears euvolemic   -on GDMT    hx of a fib   -continue eliquis   -amiodarone    suicidal ideation  -pt stating he would jump [place] and has also made several insinuations while admitted that he would end his life at home  -psych consult placed on 5/14, appreciate evaluation, they deem pt requiring IPP  -i spoke with psych on 5/16, after getting collateral information from the wife (which they had not been able to do before in the last few days), pt is cleared by psych, pt frequently makes these statements out of frustration, cancel IPP admission  -i spoke with the wife myself as well on 5/16 and said that he is safe to go home.     DM2  HLD  -farxiga  -zetia  -fenofibrate    demand ischemia  hx of iscehmic cardiomyopathy, s.p CRT-D  CAD s/p PIC 2019  -troponins elevated lkely in setting of hypoxia   -troponin downtrending   -on plavix   -continue entresto/metoprolol GDMT    dvt ppx - eliquis  pending: stable for admission to IPP 5/16     pt seen and examined on the day of discharge  Vitals: HD stable, O2 stable  Gen: appropriate affect, no acute distress  Neuro: no focal defects, no sensory deficits  HEENT: EOMI, no JVD, normocephalic, atraumatic, no scleral icterus  Cardio: RRR, S1 S2 present, no murmurs, rubs, or gallops  Resp: lungs b/l CTA, chest wall intact  Abd: soft, nondistended, nontender  MSK: no gross joint abnormalities, no obvious swelling  Skin: no rashes, no wounds  heme: no ecchymosis or petechiae     Pt was stable for DC. Pt was given strict instructions and return precautions. pt was instructed to return to the ER if symptoms worsen at any time and to return to the ER at any time for any other medical concern. Pt was counseled on continuing home medications and new prescriptions.     I have spent more than 30 minutes of time on the discharge encounter which excludes separately reported services    please note that this is a summary of the medical record. please refer to the EMR for further details.

## 2025-05-14 NOTE — BH CONSULTATION LIAISON ASSESSMENT NOTE - NSBHCONSULTMEDAGITATION_PSY_A_CORE FT
We recommend Haldol 5 mg P.O Q 6 hrs ,Benadryl 50mg P.O Q 6 hours and Ativan 2mg P.O Q 6 hrs PRN in combination PRN for agitation H. Please note that this medication combination can be given via the intramuscular route if patient is severely agitated and is considered a danger to himself or others. Please ensure that QTC is < 500

## 2025-05-14 NOTE — BH CONSULTATION LIAISON ASSESSMENT NOTE - DETAILS
Diabetes  CHF, Afib, CAD As per chart , patient is likely a  , however this is unclear  As per chart , patient seems to have endured some traumatic experiences during his work life , however these experiences are unclear for now

## 2025-05-14 NOTE — BH CONSULTATION LIAISON ASSESSMENT NOTE - NSBHCHARTREVIEWLAB_PSY_A_CORE FT
12.5   15.13 )-----------( 389      ( 13 May 2025 10:40 )             40.0       05-13    140  |  103  |  17  ----------------------------<  226[H]  4.3   |  25  |  1.2    Ca    9.5      13 May 2025 10:40  Phos  3.8     05-13  Mg     1.7     05-13    TPro  6.8  /  Alb  3.6  /  TBili  0.2  /  DBili  x   /  AST  12  /  ALT  10  /  AlkPhos  102  05-12

## 2025-05-14 NOTE — DISCHARGE NOTE PROVIDER - NSDCFUSCHEDAPPT_GEN_ALL_CORE_FT
[facility]  CARDIOLOGY [address]  Scheduled Appointment: 05/22/2025

## 2025-05-14 NOTE — BH CONSULTATION LIAISON ASSESSMENT NOTE - CURRENT MEDICATION
MEDICATIONS  (STANDING):  aMIOdarone    Tablet 200 milliGRAM(s) Oral daily  apixaban 5 milliGRAM(s) Oral two times a day  atorvastatin 40 milliGRAM(s) Oral at bedtime  chlorhexidine 2% Cloths 1 Application(s) Topical <User Schedule>  clopidogrel Tablet 75 milliGRAM(s) Oral daily  dapagliflozin 10 milliGRAM(s) Oral daily  dextrose 5%. 1000 milliLiter(s) (100 mL/Hr) IV Continuous <Continuous>  dextrose 5%. 1000 milliLiter(s) (50 mL/Hr) IV Continuous <Continuous>  dextrose 50% Injectable 25 Gram(s) IV Push once  dextrose 50% Injectable 12.5 Gram(s) IV Push once  dextrose 50% Injectable 25 Gram(s) IV Push once  ezetimibe 10 milliGRAM(s) Oral daily  famotidine    Tablet 20 milliGRAM(s) Oral daily  fenofibrate Tablet 145 milliGRAM(s) Oral daily  glucagon  Injectable 1 milliGRAM(s) IntraMuscular once  insulin glargine Injectable (LANTUS) 32 Unit(s) SubCutaneous every morning  insulin lispro (ADMELOG) corrective regimen sliding scale   SubCutaneous three times a day before meals  insulin lispro (ADMELOG) corrective regimen sliding scale   SubCutaneous at bedtime  metoprolol succinate ER 25 milliGRAM(s) Oral daily  sacubitril 24 mG/valsartan 26 mG 1 Tablet(s) Oral two times a day  spironolactone 25 milliGRAM(s) Oral every 24 hours  torsemide 20 milliGRAM(s) Oral daily    MEDICATIONS  (PRN):  dextrose Oral Gel 15 Gram(s) Oral once PRN Blood Glucose LESS THAN 70 milliGRAM(s)/deciliter  guaiFENesin  milliGRAM(s) Oral every 12 hours PRN Cough/congestion

## 2025-05-14 NOTE — PROGRESS NOTE ADULT - ASSESSMENT
70 year old male with past medical history of  COPD (on 2L home O2), diabetes HFrEF 43%, ischemic cardiomyopathy s/p CRT-D, (Entresto, spironolactone 25, torsemide  20mg AVR, hypertension, paroxysmal A-fib (status post ablation 1/14/25) on amiodarone Eliquis  , CAD s/p PCI (2019), , hyperlipidemia, chronic leukocytosis recent admission 4/19/25-4/25/25 forNSTEMI,  fluid overload     AHRF requiring NC  -on 2L NC wean as tolerated  -in setting of likely mild HFrEF exacerbation, exacerbated by increased salt intake from takeout    mild HFrEF exacerbation  -increase torsemide to 20 mg BID as consulted by cardiology  -pt appears euvolemic on exam but on O2, wean O2 as tolerated     hx of a fib   -continue eliquis   -amiodarone    suicidal ideation  -pt stating he would [place]  -psych consult placed on 5/14, appreciate evaluation     DM2  HLD  -farxiga  -zetia  -fenofibrate    demand ischemia  hx of iscehmic cardiomyopathy, s.p CRT-D  CAD s/p PIC 2019  -troponins elevated lkely in setting of hypoxia   -troponin downtrending   -on plavix   -continue entresto/metoprolol GDMT    dvt ppx - eliquis  pending: pending weaning of O2 and eval for potential IPP with psychiatry

## 2025-05-14 NOTE — DISCHARGE NOTE PROVIDER - NSDCMRMEDTOKEN_GEN_ALL_CORE_FT
amiodarone 200 mg oral tablet: 1 tab(s) orally once a day  apixaban 5 mg oral tablet: 1 tab(s) orally every 12 hours  atorvastatin 40 mg oral tablet: 1 tab(s) orally once a day  clopidogrel 75 mg oral tablet: 1 tab(s) orally once a day  dapagliflozin 10 mg oral tablet: 1 tab(s) orally once a day  Entresto 24 mg-26 mg oral tablet: 1 tab(s) orally 2 times a day  eszopiclone 3 mg oral tablet: 1 tab(s) orally once a day (at bedtime)  ezetimibe 10 mg oral tablet: 1 orally once a day  famotidine 40 mg oral tablet: 1 tab(s) orally once a day  fenofibrate 145 mg oral tablet: 1 orally once a day  metFORMIN 500 mg oral tablet: 1 tab(s) orally 2 times a day  metoprolol succinate 25 mg oral tablet, extended release: 1 tab(s) orally once a day  spironolactone 25 mg oral tablet: 1 tab(s) orally every 24 hours  torsemide 20 mg oral tablet: 1 tab(s) orally once a day  Tresiba 100 units/mL subcutaneous solution: 34 unit(s) subcutaneous once a day (in the morning)   amiodarone 200 mg oral tablet: 1 tab(s) orally once a day  apixaban 5 mg oral tablet: 1 tab(s) orally every 12 hours  atorvastatin 40 mg oral tablet: 1 tab(s) orally once a day  clopidogrel 75 mg oral tablet: 1 tab(s) orally once a day  dapagliflozin 10 mg oral tablet: 1 tab(s) orally once a day  diphenhydrAMINE 50 mg oral capsule: 1 cap(s) orally every 6 hours As needed Combative behavior  Entresto 24 mg-26 mg oral tablet: 1 tab(s) orally 2 times a day  eszopiclone 3 mg oral tablet: 1 tab(s) orally once a day (at bedtime)  ezetimibe 10 mg oral tablet: 1 orally once a day  famotidine 40 mg oral tablet: 1 tab(s) orally once a day  fenofibrate 145 mg oral tablet: 1 orally once a day  haloperidol 5 mg oral tablet: 1 tab(s) orally every 6 hours As needed agitation  LORazepam 2 mg oral tablet: 1 tab(s) orally every 6 hours As needed Agitation  melatonin 5 mg oral tablet: 1 tab(s) orally once a day (at bedtime)  metFORMIN 500 mg oral tablet: 1 tab(s) orally 2 times a day  metoprolol succinate 50 mg oral tablet, extended release: 1 tab(s) orally once a day  Soaanz 20 mg oral tablet: 1 tab(s) orally every 12 hours  spironolactone 25 mg oral tablet: 1 tab(s) orally every 24 hours  Tresiba 100 units/mL subcutaneous solution: 34 unit(s) subcutaneous once a day (in the morning)

## 2025-05-14 NOTE — BH CONSULTATION LIAISON ASSESSMENT NOTE - HPI (INCLUDE ILLNESS QUALITY, SEVERITY, DURATION, TIMING, CONTEXT, MODIFYING FACTORS, ASSOCIATED SIGNS AND SYMPTOMS)
Mr Frey is a 70 year old man with a history of Depression and PTSD who was admitted to the medical floor for the management of chest pain and CHF exacerbation. Psychiatry consult was called for suicidal ideations . According to the medical team, patient wanted to leave against medical advice yesterday night but became agitated , required PRN medication to calm down an physical restraints  and also became agitated this morning and made a suicidal statement .  Mr Frey is a 70 year old  man, resides with his wife , retired  with a history of Depression and unclear diagnosis of PTSD who was admitted to the medical floor for the management of chest pain and CHF exacerbation. Psychiatry consult was called for suicidal ideations . According to the medical team, patient wanted to leave against medical advice yesterday night but became agitated , required PRN medication to calm down an physical restraints  and also became agitated this morning and made a suicidal statement .     Patient seen and interviewed. 1 to 1 at bedside.    Upon approach, patient is observed to be lying on his hospital bed, eyes initially closed but opened them when his name was called ,  irritable , refusing to patriciate in the interview .  The interview was limited because patient refused to engage with the Tele  Psychiatry team. he reports that he did not want anything to go with the Psychiatry team and asked us to leave. Writer asked patient if it is okay for us to speak to his medical doctors and nurses and he states " I dont want to have anything to do with them " .   Writer asked patient if we could speak to his family members and he states " they are just a piece of work , I don't want to have anything to do with them ".   Patient closed his eyes and refused to continue with the interview .     According to the nurse taking care of patient , he tried to leave against medical advice in the early hours of this morning , however while staff was preparing the documents , patient told staff that he wanted to jump off the [facility] bridge. Nurse reports that patient became increasingly agitated and combative with staff causing them to give him Ativan 2mg I.M for agitation . Nurse reports that patient was in good behavioral control until a few minutes ago when he tried to elope, and was threatening to stab the PCA. Nurse reports that he did not receive any medication for agitation to calm down .   Nurse reports that patient's wife had called for an update , was informed of the events that happened this morning and she reported to him that patient is very irritable , has made suicidal statements in the past however is concerned for his safety and would like him to remain in the hospital.     Writer attempted to call patient's wife Ms Birmingham ( [phone] ) however did not get through .     On chata review , patient was seen by the  Psychiatry team in March and April of 2025 and seems to have a pattern of irritability , has made suicidal statements during a medical admission necessitating a psychiatry consult  , seems to have a history of PTSD although unclear and is non complaint with psychiatric medication.    Mr Frey is a 70 year old  man, resides with his wife , retired  with a history of Depression and unclear diagnosis of PTSD who was admitted to the medical floor for the management of chest pain and CHF exacerbation. Psychiatry consult was called for suicidal ideations . According to the medical team, patient wanted to leave against medical advice yesterday night but became agitated , required PRN medication to calm down an physical restraints  and also became agitated this morning and made a suicidal statement .     Patient seen and interviewed. 1 to 1 at bedside.    Upon approach, patient is observed to be lying on his hospital bed, eyes initially closed but opened them when his name was called ,  irritable , refusing to patriciate in the interview .  The interview was limited because patient refused to engage with the Tele  Psychiatry team. he reports that he did not want anything to go with the Psychiatry team and asked us to leave. Writer asked patient if it is okay for us to speak to his medical doctors and nurses and he states " I dont want to have anything to do with them " .   Writer asked patient if we could speak to his family members and he states " they are just a piece of work , I don't want to have anything to do with them ".   Patient closed his eyes and refused to continue with the interview .     According to the nurse taking care of patient , he tried to leave against medical advice in the early hours of this morning , however while staff was preparing the documents , patient told staff that he wanted to jump off the [facility] bridge. Nurse reports that patient became increasingly agitated and combative with staff causing them to give him Ativan 2mg I.M for agitation . Nurse reports that patient was in good behavioral control until a few minutes ago when he tried to elope, and was threatening to stab the PCA. Nurse reports that he did not receive any medication for agitation to calm down .   Nurse reports that patient's wife had called for an update , was informed of the events that happened this morning and she reported to him that patient is very irritable , has made suicidal statements in the past however is concerned for his safety and would like him to remain in the hospital.     Writer attempted to call patient's wife Ms Birmingham ( [phone] ) however did not get through .     On chata review , patient was seen by the  Psychiatry team in March and April of 2025 and seems to have a pattern of irritability , has made suicidal statements during a medical admission necessitating a psychiatry consult  , seems to have a history of PTSD although unclear and is non complaint with psychiatric medication. On chart review, it is suggestive that patient has significant anxiety and feelings of demoralization about his perception of disability with regards to his medical problems .

## 2025-05-14 NOTE — DISCHARGE NOTE PROVIDER - CARE PROVIDER_API CALL
Martin Shaver  Internal Medicine  [address] [place] [id number]  Phone: [phone]  Fax: [phone]  Follow Up Time: 1-3 days

## 2025-05-14 NOTE — DISCHARGE NOTE PROVIDER - NSDCCPCAREPLAN_GEN_ALL_CORE_FT
PRINCIPAL DISCHARGE DIAGNOSIS  Diagnosis: Acute pulmonary edema  Assessment and Plan of Treatment: You are leaving hospital against medical advice. Risk of heart failure can cause heart attack and death. Continue your medications. Continue home oxygen, return to hospital if your develop worsening shortness of breath or chest pain. Babso kelvin duval primary care doctor.     PRINCIPAL DISCHARGE DIAGNOSIS  Diagnosis: Acute respiratory failure with hypoxia  Assessment and Plan of Treatment: you were seen in the hospital of shotness of breath in the setting of Congestive heart failure exacerbation. you were given an increased dose in your torsemide and you did improve. you were requiring oxygen temporarily but you were able to be weaned off of it. you also were able to get fluid off with the icnreased dose of the torsemide. please return to the hospital at any point if you have any worsening of your condition or if you have any other medical concerns.      SECONDARY DISCHARGE DIAGNOSES  Diagnosis: CHF exacerbation  Assessment and Plan of Treatment: please see above.    Diagnosis: Suicidal ideation  Assessment and Plan of Treatment: you [facility]. you were evaluated by psychiatry and they recommended admission to inpatient psychiatry.

## 2025-05-14 NOTE — BH CONSULTATION LIAISON ASSESSMENT NOTE - RISK ASSESSMENT
Risk factors for suicide in this patient are his psychiatric diagnoses , non compliance with medications, chronic medical problems , impulsivity ,

## 2025-05-14 NOTE — BH CONSULTATION LIAISON ASSESSMENT NOTE - NSBHCONSULTRECOMMENDOTHER_PSY_A_CORE FT
1. There is no acue indication for starting any psychoatropic medications for now  1. There is no acute indication for starting any psychotropic medications for now   2. We recommend melatonin 5 –10 mg P.O bedtime to regulate sleep wake cycle   3. We recommend behavioral interventions, and environmental modifications to help patient's orientation and help him feel safe in addition to implementing delirium precautions as per nursing staff.    4. We recommend avoiding or cautious use of medications that can worsen delirium in this case such as benzodiazepines, anticholinergic agents, opioid pain medications   5. We will need to obtain collateral information from patients family   6. Upon medical clearance and bed availably , patient can be transferred to the inpatient psychiatry floor involuntarily   7. The Tele  Psychiatry team will continue to follow

## 2025-05-14 NOTE — CHART NOTE - NSCHARTNOTEFT_GEN_A_CORE
Patient wants to sign AMA, alert and orientated x4.  appears to be understand the risks, benefits, and alterantives  but reports that he wants to kill himself by jumping [place] the [place]  conversation was witnessed by RN  sitter consult  psych consult
Pt c/o chest pain, resolved now.      Vital Signs Last 24 Hrs  T(C): 36.6 (13 May 2025 14:13), Max: 36.7 (12 May 2025 22:52)  T(F): 97.8 (13 May 2025 14:13), Max: 98 (12 May 2025 22:52)  HR: 91 (13 May 2025 18:27) (71 - 107)  BP: 174/89 (13 May 2025 18:27) (115/70 - 174/89)  BP(mean): 119 (13 May 2025 05:29) (119 - 119)  RR: 18 (13 May 2025 14:13) (18 - 22)  SpO2: 93% (13 May 2025 18:27) (92% - 100%)    PHYSICAL EXAM:      Constitutional: A&Ox4  Respiratory: cta b/l  Cardiovascular: s1 s2 rrr  Gastrointestinal: soft nt  nd + bs no rebound or guarding  Genitourinary: no cva tenderness  Extremities: normal rom, no edema, calf tenderness      EKG_no change from previous, no ischemic changes    1. chest pain, CHF  -telemetry monitoring  -f/u 10pm labs  -d/w Dr Loyd
Pt seen and examined at bedside, discussed plan and answered questions; discussed w/ cardio; ordered labs; adjusted meds and ordered labs
Consult to Telepsych called and will evaluate.

## 2025-05-14 NOTE — DISCHARGE NOTE PROVIDER - DISCHARGE DIET
Spoke to patient, full name and date of birth verified.  Informed of recommendation from Dr. Smith.   Offered patient first available appointment on Monday 1/27/25 with GORDO Robles, patient declined.     Patient would prefer to see Dr. Max, but his first available is not until 2/10/25.     We discussed [facility] for evaluation without appointment.   Patient stated she would prefer to be seen sooner than Monday and she knows where the [facility] is located.     Patient will go in for evaluation tonight or tomorrow.    Postponed for follow up.    Regular Diet - No restrictions/Consistent Carbohydrate Diabetic Diets

## 2025-05-15 LAB — SARS-COV-2 RNA SPEC QL NAA+PROBE: SIGNIFICANT CHANGE UP

## 2025-05-15 PROCEDURE — 99231 SBSQ HOSP IP/OBS SF/LOW 25: CPT | Mod: 2W

## 2025-05-15 PROCEDURE — 99232 SBSQ HOSP IP/OBS MODERATE 35: CPT

## 2025-05-15 PROCEDURE — 99233 SBSQ HOSP IP/OBS HIGH 50: CPT

## 2025-05-15 RX ORDER — METOPROLOL SUCCINATE 50 MG/1
50 TABLET, EXTENDED RELEASE ORAL DAILY
Refills: 0 | Status: DISCONTINUED | OUTPATIENT
Start: 2025-05-15 | End: 2025-05-16

## 2025-05-15 RX ORDER — METOPROLOL SUCCINATE 50 MG/1
25 TABLET, EXTENDED RELEASE ORAL EVERY 12 HOURS
Refills: 0 | Status: DISCONTINUED | OUTPATIENT
Start: 2025-05-15 | End: 2025-05-15

## 2025-05-15 RX ADMIN — INSULIN LISPRO 1: 100 INJECTION, SOLUTION INTRAVENOUS; SUBCUTANEOUS at 16:43

## 2025-05-15 RX ADMIN — SACUBITRIL AND VALSARTAN 1 TABLET(S): 6; 6 PELLET ORAL at 17:13

## 2025-05-15 RX ADMIN — FENOFIBRATE 145 MILLIGRAM(S): 160 TABLET ORAL at 11:57

## 2025-05-15 RX ADMIN — CLOPIDOGREL BISULFATE 75 MILLIGRAM(S): 75 TABLET, FILM COATED ORAL at 11:57

## 2025-05-15 RX ADMIN — DEXTROMETHORPHAN HBR, GUAIFENESIN 600 MILLIGRAM(S): 200 LIQUID ORAL at 15:52

## 2025-05-15 RX ADMIN — Medication 20 MILLIGRAM(S): at 05:26

## 2025-05-15 RX ADMIN — ATORVASTATIN CALCIUM 40 MILLIGRAM(S): 80 TABLET, FILM COATED ORAL at 21:10

## 2025-05-15 RX ADMIN — HALOPERIDOL 5 MILLIGRAM(S): 10 TABLET ORAL at 00:54

## 2025-05-15 RX ADMIN — Medication 1 APPLICATION(S): at 05:25

## 2025-05-15 RX ADMIN — EZETIMIBE 10 MILLIGRAM(S): 10 TABLET ORAL at 11:56

## 2025-05-15 RX ADMIN — DAPAGLIFLOZIN 10 MILLIGRAM(S): 5 TABLET, FILM COATED ORAL at 11:57

## 2025-05-15 RX ADMIN — Medication 50 MILLIGRAM(S): at 21:10

## 2025-05-15 RX ADMIN — APIXABAN 5 MILLIGRAM(S): 2.5 TABLET, FILM COATED ORAL at 17:12

## 2025-05-15 RX ADMIN — INSULIN GLARGINE-YFGN 32 UNIT(S): 100 INJECTION, SOLUTION SUBCUTANEOUS at 11:58

## 2025-05-15 RX ADMIN — APIXABAN 5 MILLIGRAM(S): 2.5 TABLET, FILM COATED ORAL at 05:26

## 2025-05-15 RX ADMIN — METOPROLOL SUCCINATE 25 MILLIGRAM(S): 50 TABLET, EXTENDED RELEASE ORAL at 05:26

## 2025-05-15 RX ADMIN — AMIODARONE HYDROCHLORIDE 200 MILLIGRAM(S): 50 INJECTION, SOLUTION INTRAVENOUS at 05:26

## 2025-05-15 RX ADMIN — Medication 5 MILLIGRAM(S): at 21:11

## 2025-05-15 RX ADMIN — Medication 20 MILLIGRAM(S): at 17:12

## 2025-05-15 RX ADMIN — SACUBITRIL AND VALSARTAN 1 TABLET(S): 6; 6 PELLET ORAL at 05:25

## 2025-05-15 RX ADMIN — INSULIN LISPRO 1: 100 INJECTION, SOLUTION INTRAVENOUS; SUBCUTANEOUS at 11:57

## 2025-05-15 NOTE — BH CONSULTATION LIAISON PROGRESS NOTE - PRN MEDS
On chart review , patient received Benadryl 50mg P.o X 1 dose at 9pm and haldol 5mg P.o at 11 pm on 5/14/2025

## 2025-05-15 NOTE — PROGRESS NOTE ADULT - ASSESSMENT
70 year old male with past medical history of  COPD (on 2L home O2), diabetes HFrEF 43%, ischemic cardiomyopathy s/p CRT-D, (Entresto, spironolactone 25, torsemide  20mg AVR, hypertension, paroxysmal A-fib (status post ablation 1/14/25) on amiodarone Eliquis  , CAD s/p PCI (2019), , hyperlipidemia, chronic leukocytosis recent admission 4/19/25-4/25/25 forNSTEMI,  fluid overload     AHRF requiring NC  -on 2L NC wean as tolerated  -in setting of likely mild HFrEF exacerbation, exacerbated by increased salt intake from takeout  -resolved, on RA 5/15    mild HFrEF exacerbation  -increase torsemide to 20 mg BID as consulted by cardiology  -PVCs present, on metoprlol succinate 25 daily, will increase to 25 bid   -pt appears euvolemic   -on GDMT    hx of a fib   -continue eliquis   -amiodarone    suicidal ideation  -pt stating he would jump [place] the [place]  -psych consult placed on 5/14, appreciate evaluation     DM2  HLD  -farxiga  -zetia  -fenofibrate    demand ischemia  hx of iscehmic cardiomyopathy, s.p CRT-D  CAD s/p PIC 2019  -troponins elevated lkely in setting of hypoxia   -troponin downtrending   -on plavix   -continue entresto/metoprolol GDMT    dvt ppx - eliquis  pending: pending bed availability to IPP, otherwise euvolemic at this point, medically stable for IPP  70 year old male with past medical history of  COPD (on 2L home O2), diabetes HFrEF 43%, ischemic cardiomyopathy s/p CRT-D, (Entresto, spironolactone 25, torsemide  20mg AVR, hypertension, paroxysmal A-fib (status post ablation 1/14/25) on amiodarone Eliquis  , CAD s/p PCI (2019), , hyperlipidemia, chronic leukocytosis recent admission 4/19/25-4/25/25 forNSTEMI,  fluid overload     AHRF requiring NC  -on 2L NC wean as tolerated  -in setting of likely mild HFrEF exacerbation, exacerbated by increased salt intake from takeout  -resolved, on RA 5/15    mild HFrEF exacerbation  -increase torsemide to 20 mg BID as consulted by cardiology  -PVCs present, on metoprlol succinate 25 daily, will increase to 50 daily  -pt appears euvolemic   -on GDMT    hx of a fib   -continue eliquis   -amiodarone    suicidal ideation  -pt stating he would jump [place] the [place]  -psych consult placed on 5/14, appreciate evaluation     DM2  HLD  -farxiga  -zetia  -fenofibrate    demand ischemia  hx of iscehmic cardiomyopathy, s.p CRT-D  CAD s/p PIC 2019  -troponins elevated lkely in setting of hypoxia   -troponin downtrending   -on plavix   -continue entresto/metoprolol GDMT    dvt ppx - eliquis  pending: pending bed availability to IPP, otherwise euvolemic at this point, medically stable for IPP

## 2025-05-15 NOTE — BH CONSULTATION LIAISON PROGRESS NOTE - NSBHCONSULTRECOMMENDOTHER_PSY_A_CORE FT
1. There is no acute indication for starting any psychotropic medications for now   2. We recommend melatonin 5 –10 mg P.O bedtime to regulate sleep wake cycle   3. We recommend behavioral interventions, and environmental modifications to help patient's orientation and help him feel safe in addition to implementing delirium precautions as per nursing staff.    4. We recommend avoiding or cautious use of medications that can worsen delirium in this case such as benzodiazepines, anticholinergic agents, opioid pain medications   5. We will need to obtain collateral information from patients family memebers   6. Upon bed availably , patient can be transferred to the inpatient psychiatry floor involuntarily   7. The Tele  Psychiatry team will continue to follow  1. There is no acute indication for starting any psychotropic medications for now   2. We recommend melatonin 5 –10 mg P.O bedtime to regulate sleep wake cycle   3. We recommend behavioral interventions, and environmental modifications to help patient's orientation and help him feel safe in addition to implementing delirium precautions as per nursing staff.    4. We recommend avoiding or cautious use of medications that can worsen delirium in this case such as benzodiazepines, anticholinergic agents, opioid pain medications   5. We will need to obtain collateral information from patients family members   6. Upon bed availably , patient can be transferred to the inpatient psychiatry floor involuntarily   7. The Tele  Psychiatry team will continue to follow

## 2025-05-15 NOTE — BH CONSULTATION LIAISON PROGRESS NOTE - NSBHFUPINTERVALHXFT_PSY_A_CORE
Patient seen and interviewed. 1 to 1 at bedside.    Upon approach, patient is observed to be lying on his hospital bed, irritable , minimally cooperative, needing significant encouragement to participate in the interview .  Patient reports that he does not have suicidal thoughts and he has been saying that he wants to kill himself for the past 9 years . He states " im too much of a coward to kill myself ". He however reports feeling depressed and wants to leave the hospital . He reports that he does not want to be admitted to the psychiatric floor and does not want to talk about his psychiatric diagnosis because there is nothing to talk about. He however confirmed that he used to see psychiatrists and therapist in the past .  . He confirms that he is a  and used to work for the [employer]. He reports that he has no family but when asked about his wife and his son that he lives with , he states " those that are alive don't mean nothing to me and I don't want you calling them , if you do I will sveta ". Patient was informed that the plan is to transfer him to the inpatient psychiatry floor however he states " I will definitely kill my self and it will be your fault ".   Patient then informed writer that he no longer wanted to engage in the interview .     As per the staff on 1 to 1 observation, patient was fine until a few minutes ago , when he because agitated , throwing his food , yelling with increasing irritability for no reason.     A per nurse taking care of patient , he reportedly had an episode of agitation overnight, and another a few minutes ago .. She reports that patient is taking his oral medications  but refusing fingersticks .     According to the medical team , patient is medically cleared .     Writer attempted to call patient's wife Valencia ( [phone] ) and his son Neto ( [phone] ) for collateral information  but did not get through to them  Patient seen and interviewed. 1 to 1 at bedside.    Upon approach, patient is observed to be lying on his hospital bed, irritable , minimally cooperative, needing significant encouragement to participate in the interview .  Patient reports that he does not have suicidal thoughts and he has been saying that he wants to kill himself for the past 9 years . He states " im too much of a coward to kill myself ". He however reports feeling depressed and wants to leave the hospital . He reports that he does not want to be admitted to the psychiatric floor and does not want to talk about his psychiatric diagnosis because there is nothing to talk about. He however confirmed that he used to see psychiatrists and therapist in the past .  . He confirms that he is a  and used to work for the [employer]. He reports that he has no family but when asked about his wife and his son that he lives with , he states " those that are alive don't mean nothing to me and I don't want you calling them , if you do I will sveta ". Patient was informed that the plan is to transfer him to the inpatient psychiatry floor however he states " I will definitely kill my self and it will be your fault ".   Patient then informed writer that he no longer wanted to engage in the interview .     As per the staff on 1 to 1 observation, patient was fine until a few minutes ago , when he because agitated , throwing his food , yelling with increasing irritability for no reason.     A per nurse taking care of patient , he reportedly had an episode of agitation overnight, and another a few minutes ago .. She reports that patient is taking his oral medications  but refusing fingersticks .     Patient did not have any records on [facility] .     According to the medical team , patient is medically cleared .     Writer attempted to call patient's wife Valencia ( [phone] ) and his son Neto ( [phone] ) for collateral information  but did not get through to them

## 2025-05-16 ENCOUNTER — TRANSCRIPTION ENCOUNTER (OUTPATIENT)
Age: 70
End: 2025-05-16

## 2025-05-16 VITALS
TEMPERATURE: 98 F | SYSTOLIC BLOOD PRESSURE: 110 MMHG | RESPIRATION RATE: 17 BRPM | DIASTOLIC BLOOD PRESSURE: 74 MMHG | OXYGEN SATURATION: 96 % | HEART RATE: 80 BPM

## 2025-05-16 PROCEDURE — 99232 SBSQ HOSP IP/OBS MODERATE 35: CPT | Mod: 2W

## 2025-05-16 PROCEDURE — 99239 HOSP IP/OBS DSCHRG MGMT >30: CPT

## 2025-05-16 RX ORDER — LORAZEPAM 4 MG/ML
1 VIAL (ML) INJECTION
Qty: 0 | Refills: 0 | DISCHARGE
Start: 2025-05-16

## 2025-05-16 RX ORDER — HALOPERIDOL 10 MG/1
1 TABLET ORAL
Qty: 0 | Refills: 0 | DISCHARGE
Start: 2025-05-16

## 2025-05-16 RX ORDER — DIPHENHYDRAMINE HCL 12.5MG/5ML
1 ELIXIR ORAL
Qty: 0 | Refills: 0 | DISCHARGE
Start: 2025-05-16

## 2025-05-16 RX ORDER — METOPROLOL SUCCINATE 50 MG/1
1 TABLET, EXTENDED RELEASE ORAL
Qty: 0 | Refills: 0 | DISCHARGE
Start: 2025-05-16

## 2025-05-16 RX ORDER — TORSEMIDE 10 MG
1 TABLET ORAL
Qty: 0 | Refills: 0 | DISCHARGE
Start: 2025-05-16

## 2025-05-16 RX ORDER — MELATONIN 5 MG
1 TABLET ORAL
Qty: 0 | Refills: 0 | DISCHARGE
Start: 2025-05-16

## 2025-05-16 RX ADMIN — DEXTROMETHORPHAN HBR, GUAIFENESIN 600 MILLIGRAM(S): 200 LIQUID ORAL at 14:09

## 2025-05-16 RX ADMIN — APIXABAN 5 MILLIGRAM(S): 2.5 TABLET, FILM COATED ORAL at 05:38

## 2025-05-16 RX ADMIN — Medication 1 APPLICATION(S): at 05:37

## 2025-05-16 RX ADMIN — METOPROLOL SUCCINATE 50 MILLIGRAM(S): 50 TABLET, EXTENDED RELEASE ORAL at 05:38

## 2025-05-16 RX ADMIN — AMIODARONE HYDROCHLORIDE 200 MILLIGRAM(S): 50 INJECTION, SOLUTION INTRAVENOUS at 05:38

## 2025-05-16 RX ADMIN — Medication 20 MILLIGRAM(S): at 05:37

## 2025-05-16 RX ADMIN — SACUBITRIL AND VALSARTAN 1 TABLET(S): 6; 6 PELLET ORAL at 05:38

## 2025-05-16 RX ADMIN — Medication 25 MILLIGRAM(S): at 05:37

## 2025-05-16 NOTE — BH CONSULTATION LIAISON PROGRESS NOTE - CURRENT MEDICATION
MEDICATIONS  (STANDING):  aMIOdarone    Tablet 200 milliGRAM(s) Oral daily  apixaban 5 milliGRAM(s) Oral two times a day  atorvastatin 40 milliGRAM(s) Oral at bedtime  chlorhexidine 2% Cloths 1 Application(s) Topical <User Schedule>  clopidogrel Tablet 75 milliGRAM(s) Oral daily  dapagliflozin 10 milliGRAM(s) Oral daily  dextrose 5%. 1000 milliLiter(s) (100 mL/Hr) IV Continuous <Continuous>  dextrose 5%. 1000 milliLiter(s) (50 mL/Hr) IV Continuous <Continuous>  dextrose 50% Injectable 25 Gram(s) IV Push once  dextrose 50% Injectable 12.5 Gram(s) IV Push once  dextrose 50% Injectable 25 Gram(s) IV Push once  ezetimibe 10 milliGRAM(s) Oral daily  famotidine    Tablet 20 milliGRAM(s) Oral daily  fenofibrate Tablet 145 milliGRAM(s) Oral daily  glucagon  Injectable 1 milliGRAM(s) IntraMuscular once  insulin glargine Injectable (LANTUS) 32 Unit(s) SubCutaneous every morning  insulin lispro (ADMELOG) corrective regimen sliding scale   SubCutaneous three times a day before meals  insulin lispro (ADMELOG) corrective regimen sliding scale   SubCutaneous at bedtime  melatonin 5 milliGRAM(s) Oral at bedtime  metoprolol succinate ER 25 milliGRAM(s) Oral daily  sacubitril 24 mG/valsartan 26 mG 1 Tablet(s) Oral two times a day  spironolactone 25 milliGRAM(s) Oral every 24 hours  torsemide 20 milliGRAM(s) Oral every 12 hours    MEDICATIONS  (PRN):  dextrose Oral Gel 15 Gram(s) Oral once PRN Blood Glucose LESS THAN 70 milliGRAM(s)/deciliter  diphenhydrAMINE 50 milliGRAM(s) Oral every 6 hours PRN Combative behavior  guaiFENesin  milliGRAM(s) Oral every 12 hours PRN Cough/congestion  haloperidol     Tablet 5 milliGRAM(s) Oral every 6 hours PRN agitation  LORazepam     Tablet 2 milliGRAM(s) Oral every 6 hours PRN Agitation  
MEDICATIONS  (STANDING):  aMIOdarone    Tablet 200 milliGRAM(s) Oral daily  apixaban 5 milliGRAM(s) Oral two times a day  atorvastatin 40 milliGRAM(s) Oral at bedtime  chlorhexidine 2% Cloths 1 Application(s) Topical <User Schedule>  clopidogrel Tablet 75 milliGRAM(s) Oral daily  dapagliflozin 10 milliGRAM(s) Oral daily  dextrose 5%. 1000 milliLiter(s) (100 mL/Hr) IV Continuous <Continuous>  dextrose 5%. 1000 milliLiter(s) (50 mL/Hr) IV Continuous <Continuous>  dextrose 50% Injectable 25 Gram(s) IV Push once  dextrose 50% Injectable 12.5 Gram(s) IV Push once  dextrose 50% Injectable 25 Gram(s) IV Push once  ezetimibe 10 milliGRAM(s) Oral daily  famotidine    Tablet 20 milliGRAM(s) Oral daily  fenofibrate Tablet 145 milliGRAM(s) Oral daily  glucagon  Injectable 1 milliGRAM(s) IntraMuscular once  insulin glargine Injectable (LANTUS) 32 Unit(s) SubCutaneous every morning  insulin lispro (ADMELOG) corrective regimen sliding scale   SubCutaneous three times a day before meals  insulin lispro (ADMELOG) corrective regimen sliding scale   SubCutaneous at bedtime  melatonin 5 milliGRAM(s) Oral at bedtime  metoprolol succinate ER 50 milliGRAM(s) Oral daily  sacubitril 24 mG/valsartan 26 mG 1 Tablet(s) Oral two times a day  spironolactone 25 milliGRAM(s) Oral every 24 hours  torsemide 20 milliGRAM(s) Oral every 12 hours    MEDICATIONS  (PRN):  dextrose Oral Gel 15 Gram(s) Oral once PRN Blood Glucose LESS THAN 70 milliGRAM(s)/deciliter  diphenhydrAMINE 50 milliGRAM(s) Oral every 6 hours PRN Combative behavior  guaiFENesin  milliGRAM(s) Oral every 12 hours PRN Cough/congestion  haloperidol     Tablet 5 milliGRAM(s) Oral every 6 hours PRN agitation  LORazepam     Tablet 2 milliGRAM(s) Oral every 6 hours PRN Agitation

## 2025-05-16 NOTE — BH CONSULTATION LIAISON PROGRESS NOTE - NSBHATTESTBILLING_PSY_A_CORE
56910-Bmrutcxzsi OBS or IP - moderate complexity OR 35-49 mins
63732-Umdhuknsqi OBS or IP - low complexity OR 25-34 mins

## 2025-05-16 NOTE — BH CONSULTATION LIAISON PROGRESS NOTE - DETAILS
COPD (on 2L home O2)  HFrEF 43%, ischemic cardiomyopathy s/p CRT-D, hypertension, paroxysmal A-fib (status post ablation 1/14/25) on amiodarone Eliquis  , CAD s/p PCI (2019), , hyperlipidemia, Diabetes

## 2025-05-16 NOTE — BH CONSULTATION LIAISON PROGRESS NOTE - NSBHCONSULTFOLLOWAFTERCARE_PSY_A_CORE FT
Patient does not appear to be  amenable to outpatient psychiatric or psychotherapy services at this time, however , on discharge, he can be referred for Geriatric psychiatric and psychotherapy services on outpatient basis if  medical team discusses this level of care with him and he is in agreement with the referral.

## 2025-05-16 NOTE — BH CONSULTATION LIAISON PROGRESS NOTE - NSBHCONSULTMEDPRNREASON_PSY_A_CORE
Hypertension is improving with treatment.  Continue current treatment regimen.  Dietary sodium restriction.  Weight loss.  Regular aerobic exercise.  Blood pressure will be reassessed in 4 weeks.  
agitation...
agitation...

## 2025-05-16 NOTE — BH CONSULTATION LIAISON PROGRESS NOTE - NSBHCHARTREVIEWVS_PSY_A_CORE FT
Vital Signs Last 24 Hrs  T(C): 37.3 (15 May 2025 05:18), Max: 37.3 (15 May 2025 05:18)  T(F): 99.1 (15 May 2025 05:18), Max: 99.1 (15 May 2025 05:18)  HR: 87 (15 May 2025 05:18) (87 - 99)  BP: 123/82 (15 May 2025 05:18) (123/82 - 164/80)  BP(mean): --  RR: 18 (15 May 2025 05:18) (18 - 18)  SpO2: 95% (15 May 2025 05:18) (93% - 95%)    Parameters below as of 15 May 2025 05:18  Patient On (Oxygen Delivery Method): room air    
Vital Signs Last 24 Hrs  T(C): 36.6 (16 May 2025 05:01), Max: 37.4 (15 May 2025 14:31)  T(F): 97.9 (16 May 2025 05:01), Max: 99.3 (15 May 2025 14:31)  HR: 93 (16 May 2025 05:01) (87 - 93)  BP: 129/64 (16 May 2025 05:01) (102/72 - 135/65)  BP(mean): --  RR: 18 (16 May 2025 05:01) (18 - 18)  SpO2: 96% (16 May 2025 05:01) (96% - 98%)    Parameters below as of 15 May 2025 20:08  Patient On (Oxygen Delivery Method): room air

## 2025-05-16 NOTE — BH CONSULTATION LIAISON PROGRESS NOTE - NSBHFUPINTERVALHXFT_PSY_A_CORE
Patient seen and interviewed. 1 to 1 at bedside.    Upon approach, patient is observed to be lying on his hospital bed, initially uncooperative with the interview , irritable however soon because calm , co-operative , well oriented to time, place and person.   Patient intiatilaay asked the tele  psychiatry team to dez stating Cynthia ". He reported that he did not want to spek with wirter and that witmoose will be sued if he is transgeed to the inpatient psychiatry floor .   After much encouragement , patient reports being frusterated with being in the hospital and wanting to  go hme . he reports that staff misunderstood him and that he has no intention to end his life , describing himself as being too cowardly to do it . he reports that he has never attamepted suside in the past and has no intention of ever doing so. he reports that he is depressed and frustrated about his medical problems but otherwise has okay sleep and appetite  denies hopelessness and helplessness.  he reports that he recently celebrated his 50th wedding anniversaryand has no reason to want to end his life , he state " I say that all the time ".  he reports that he is complaint with his medical medications when asked of he was taking care of his medical needs. He reports that he take his medications as prescribed. he reports that he has never been on psychiatric medication in the past but has been in psychotherapy . He reports that he no longer wants tp participate in this service .   he also denies acute symptoms o psychosis and senthil      Patient  reports that he does not want writer to speak to his son but gave consent for writer to obtain collateral information from his wife .     Writer called Ms [place] ( [phone] ). She reports that patient tends to make suicidal statements al the time especially when frustrated . She reports that she can not determine if he will ever try to kill himself however she has no current concerns for suicide . She states " he is a stubborn man and is there acting up and saying stupid things ". She reports that both of them walk with walkers and patient has 2 aides that come to the house to take care of him.

## 2025-05-16 NOTE — DISCHARGE NOTE NURSING/CASE MANAGEMENT/SOCIAL WORK - FINANCIAL ASSISTANCE
[facility] provides services at a reduced cost to those who are determined to be eligible through [facility]’s financial assistance program. Information regarding [facility]’s financial assistance program can be found by going to [email or web address] or by calling [phone].

## 2025-05-16 NOTE — DISCHARGE NOTE NURSING/CASE MANAGEMENT/SOCIAL WORK - PATIENT PORTAL LINK FT
You can access the FollowMyHealth Patient Portal offered by [facility] by registering at the following website: [email or web address]. By joining [employer]’s FollowMyHealth portal, you will also be able to view your health information using other applications (apps) compatible with our system.

## 2025-05-16 NOTE — BH CONSULTATION LIAISON PROGRESS NOTE - NSBHASSESSMENTFT_PSY_ALL_CORE
Mr Frey is a 70 year old man with a history of Depression and an unclear diagnosis of PTSD who was admitted to the medical floor for the management of chest pain and CHF exacerbation. Psychiatry consult was called for suicidal ideations . According to the medical team, patient wanted to leave against medical advice yesterday night but became agitated , required PRN medication to calm down an physical restraints  and also became agitated this morning and made a suicidal statement .    It appears that patient makes suicidal statements  and has mood lability is a means of expressing his frustrations with his medical and social stressors . It does not appears that this is an indication of an acute decompensation of his psychiatric illness.   For now, patients do not appear to have acute symptoms of psychosis, senthil or depression. He denies having current suicidal ideations, intent or plan.    At this time, patient is not considered an imminent danger to himself or others and does not need inpatient psychiatric hospitalization.      
Mr Frey is a 70 year old man with a history of Depression and an unclear diagnosis of PTSD who was admitted to the medical floor for the management of chest pain and CHF exacerbation. Psychiatry consult was called for suicidal ideations . According to the medical team, patient wanted to leave against medical advice yesterday night but became agitated , required PRN medication to calm down an physical restraints  and also became agitated this morning and made a suicidal statement .   Patient continues to have significant mood lability. It is possible that his presentations is behavioral in nature , however given his history and risk factors , an untreated psychiatric illness affecting his function is strongly considered.   At this time, patient is considered a danger to himself and others and needs inpatient psychiatric hospitalization for medication management and symptoms stabilization upon medical clearance and bed availability.

## 2025-05-16 NOTE — PROGRESS NOTE ADULT - ASSESSMENT
70 year old male with past medical history of  COPD (on 2L home O2), diabetes HFrEF 43%, ischemic cardiomyopathy s/p CRT-D, (Entresto, spironolactone 25, torsemide  20mg AVR, hypertension, paroxysmal A-fib (status post ablation 1/14/25) on amiodarone Eliquis  , CAD s/p PCI (2019), , hyperlipidemia, chronic leukocytosis recent admission 4/19/25-4/25/25 forNSTEMI,  fluid overload     AHRF requiring NC  -on 2L NC wean as tolerated  -in setting of likely mild HFrEF exacerbation, exacerbated by increased salt intake from takeout  -resolved, on RA 5/15    mild HFrEF exacerbation  -increase torsemide to 20 mg BID as consulted by cardiology  -PVCs present, on metoprlol succinate 25 daily, will increase to 50 daily  -pt appears euvolemic   -on GDMT    hx of a fib   -continue eliquis   -amiodarone    suicidal ideation  -pt stating [place] and has also made several insinuations while admitted that he would end his life at home  -psych consult placed on 5/14, appreciate evaluation, they deem pt requiring IPP    DM2  HLD  -farxiga  -zetia  -fenofibrate    demand ischemia  hx of iscehmic cardiomyopathy, s.p CRT-D  CAD s/p PIC 2019  -troponins elevated lkely in setting of hypoxia   -troponin downtrending   -on plavix   -continue entresto/metoprolol GDMT    dvt ppx - eliquis  pending: stable for admission to IPP 5/16  70 year old male with past medical history of  COPD (on 2L home O2), diabetes HFrEF 43%, ischemic cardiomyopathy s/p CRT-D, (Entresto, spironolactone 25, torsemide  20mg AVR, hypertension, paroxysmal A-fib (status post ablation 1/14/25) on amiodarone Eliquis  , CAD s/p PCI (2019), , hyperlipidemia, chronic leukocytosis recent admission 4/19/25-4/25/25 forNSTEMI,  fluid overload     AHRF requiring NC  -on 2L NC wean as tolerated  -in setting of likely mild HFrEF exacerbation, exacerbated by increased salt intake from takeout  -resolved, on RA 5/15    mild HFrEF exacerbation  -increase torsemide to 20 mg BID as consulted by cardiology  -PVCs present, on metoprolol succinate 25 daily, will increase to 50 daily  -pt appears euvolemic   -on GDMT    hx of a fib   -continue eliquis   -amiodarone    suicidal ideation  -pt stating [place] and has also made several insinuations while admitted that he would end his life at home  -psych consult placed on 5/14, appreciate evaluation, they deem pt requiring IPP  -i spoke with psych on 5/16, after getting collateral information from the wife (which they had not been able to do before in the last few days), the wife states that the pt says these kinds of things frequently at home, the wife states that the pt is safe to go home and she will watch him at home. Because of this collateral information, the admission to psychiatry inpatient was cancelled.   -i spoke with the wife myself as well on 5/16 and she reiterated this information to me and said that he is safe to go home.     DM2  HLD  -farxiga  -zetia  -fenofibrate    demand ischemia  hx of iscehmic cardiomyopathy, s.p CRT-D  CAD s/p PIC 2019  -troponins elevated lkely in setting of hypoxia   -troponin downtrending   -on plavix   -continue entresto/metoprolol GDMT    dvt ppx - eliquis  pending: stable for admission to IPP 5/16  70 year old male with past medical history of  COPD (on 2L home O2), diabetes HFrEF 43%, ischemic cardiomyopathy s/p CRT-D, (Entresto, spironolactone 25, torsemide  20mg AVR, hypertension, paroxysmal A-fib (status post ablation 1/14/25) on amiodarone Eliquis  , CAD s/p PCI (2019), , hyperlipidemia, chronic leukocytosis recent admission 4/19/25-4/25/25 forNSTEMI,  fluid overload     AHRF requiring NC  -on 2L NC wean as tolerated  -in setting of likely mild HFrEF exacerbation, exacerbated by increased salt intake from takeout  -resolved, on RA 5/15    mild HFrEF exacerbation  -increase torsemide to 20 mg BID as consulted by cardiology  -PVCs present, on metoprolol succinate 25 daily, will increase to 50 daily  -pt appears euvolemic   -on GDMT    hx of a fib   -continue eliquis   -amiodarone    suicidal ideation  -pt stating [place] and has also made several insinuations while admitted that he would end his life at home  -psych consult placed on 5/14, appreciate evaluation, they deem pt requiring IPP  -i spoke with psych on 5/16, after getting collateral information from the wife (which they had not been able to do before in the last few days), pt is cleared by psych, cancel IPP admission  -i spoke with the wife myself as well on 5/16 and said that he is safe to go home.     DM2  HLD  -farxiga  -zetia  -fenofibrate    demand ischemia  hx of iscehmic cardiomyopathy, s.p CRT-D  CAD s/p PIC 2019  -troponins elevated lkely in setting of hypoxia   -troponin downtrending   -on plavix   -continue entresto/metoprolol GDMT    dvt ppx - eliquis  pending: stable for admission to IPP 5/16

## 2025-05-16 NOTE — BH CONSULTATION LIAISON PROGRESS NOTE - NSBHCONSULTMEDAGITATION_PSY_A_CORE FT
We recommend Haldol 5 mg P.O Q 6 hrs ,Benadryl 50mg P.O Q 6 hours and Ativan 2mg P.O Q 6 hrs PRN in combination PRN for agitation H. Please note that this medication combination can be given via the intramuscular route if patient is severely agitated and is considered a danger to himself or others. Please ensure that QTC is < 500 
We recommend Haldol 5 mg P.O Q 6 hrs ,Benadryl 50mg P.O Q 6 hours and Ativan 2mg P.O Q 6 hrs PRN in combination PRN for agitation H. Please note that this medication combination can be given via the intramuscular route if patient is severely agitated and is considered a danger to himself or others. Please ensure that QTC is < 500

## 2025-05-16 NOTE — PROGRESS NOTE ADULT - SUBJECTIVE AND OBJECTIVE BOX
Patient is a 70y old  Male who presents with a chief complaint of     Pt seen and examined at bedside. No CP or SOB.          PAST MEDICAL & SURGICAL HISTORY:  CHF (congestive heart failure)    Diabetes    CAD (coronary artery disease)    Chronic obstructive pulmonary disease (COPD)    HTN (hypertension)    Stented coronary artery    Dyslipidemia    GERD (gastroesophageal reflux disease)    Afib    CVA (cerebral vascular accident)    H/O heart artery stent    Heart valve replaced  aortic    History of Nissen fundoplication        VITAL SIGNS (Last 24 hrs):  T(C): 37.4 (05-15-25 @ 14:31), Max: 37.4 (05-15-25 @ 14:31)  HR: 89 (05-15-25 @ 14:31) (87 - 99)  BP: 135/65 (05-15-25 @ 14:31) (123/82 - 164/80)  RR: 18 (05-15-25 @ 14:31) (18 - 18)  SpO2: 96% (05-15-25 @ 14:31) (93% - 96%)  Wt(kg): --  Daily     Daily     I&O's Summary    14 May 2025 07:01  -  15 May 2025 07:00  --------------------------------------------------------  IN: 0 mL / OUT: 1050 mL / NET: -1050 mL        PHYSICAL EXAM:  GENERAL: NAD, well-developed  HEAD:  Atraumatic, Normocephalic  EYES: EOMI, PERRLA, conjunctiva and sclera clear  NECK: Supple, No JVD  CHEST/LUNG: Clear to auscultation bilaterally; No wheeze  HEART: Regular rate and rhythm; No murmurs, rubs, or gallops  ABDOMEN: Soft, Nontender, Nondistended; Bowel sounds present  EXTREMITIES:  2+ Peripheral Pulses, No clubbing, cyanosis, or edema  PSYCH: AAOx3  NEUROLOGY: non-focal  SKIN: No rashes or lesions    Labs Reviewed  Spoke to patient in regards to abnormal labs.    CBC Full  -  ( 13 May 2025 10:40 )  WBC Count : 15.13 K/uL  Hemoglobin : 12.5 g/dL  Hematocrit : 40.0 %  Platelet Count - Automated : 389 K/uL  Mean Cell Volume : 84.6 fL  Mean Cell Hemoglobin : 26.4 pg  Mean Cell Hemoglobin Concentration : 31.3 g/dL  Auto Neutrophil # : x  Auto Lymphocyte # : x  Auto Monocyte # : x  Auto Eosinophil # : x  Auto Basophil # : x  Auto Neutrophil % : x  Auto Lymphocyte % : x  Auto Monocyte % : x  Auto Eosinophil % : x  Auto Basophil % : x    BMP:    05-13 @ 10:40    Blood Urea Nitrogen - 17  Calcium - 9.5  Carbond Dioxide - 25  Chloride - 103  Creatinine - 1.2  Glucose - 226  Potassium - 4.3  Sodium - 140      Hemoglobin A1c -     Urine Culture:        COVID Labs  CRP:      D-Dimer:            Imaging reviewed independently and reviewed read        MEDICATIONS  (STANDING):  aMIOdarone    Tablet 200 milliGRAM(s) Oral daily  apixaban 5 milliGRAM(s) Oral two times a day  atorvastatin 40 milliGRAM(s) Oral at bedtime  chlorhexidine 2% Cloths 1 Application(s) Topical <User Schedule>  clopidogrel Tablet 75 milliGRAM(s) Oral daily  dapagliflozin 10 milliGRAM(s) Oral daily  dextrose 5%. 1000 milliLiter(s) (100 mL/Hr) IV Continuous <Continuous>  dextrose 5%. 1000 milliLiter(s) (50 mL/Hr) IV Continuous <Continuous>  dextrose 50% Injectable 25 Gram(s) IV Push once  dextrose 50% Injectable 12.5 Gram(s) IV Push once  dextrose 50% Injectable 25 Gram(s) IV Push once  ezetimibe 10 milliGRAM(s) Oral daily  famotidine    Tablet 20 milliGRAM(s) Oral daily  fenofibrate Tablet 145 milliGRAM(s) Oral daily  glucagon  Injectable 1 milliGRAM(s) IntraMuscular once  insulin glargine Injectable (LANTUS) 32 Unit(s) SubCutaneous every morning  insulin lispro (ADMELOG) corrective regimen sliding scale   SubCutaneous three times a day before meals  insulin lispro (ADMELOG) corrective regimen sliding scale   SubCutaneous at bedtime  melatonin 5 milliGRAM(s) Oral at bedtime  metoprolol succinate ER 25 milliGRAM(s) Oral daily  sacubitril 24 mG/valsartan 26 mG 1 Tablet(s) Oral two times a day  spironolactone 25 milliGRAM(s) Oral every 24 hours  torsemide 20 milliGRAM(s) Oral every 12 hours    MEDICATIONS  (PRN):  dextrose Oral Gel 15 Gram(s) Oral once PRN Blood Glucose LESS THAN 70 milliGRAM(s)/deciliter  diphenhydrAMINE 50 milliGRAM(s) Oral every 6 hours PRN Combative behavior  guaiFENesin  milliGRAM(s) Oral every 12 hours PRN Cough/congestion  haloperidol     Tablet 5 milliGRAM(s) Oral every 6 hours PRN agitation  LORazepam     Tablet 2 milliGRAM(s) Oral every 6 hours PRN Agitation      
Date of service: 05/15/25    chief complaint: dyspnea     extended hpi: 70 year old male with past medical history of  COPD (on 2L home O2), diabetes HFrEF 43%, ischemic cardiomyopathy s/p CRT-D, (Entresto, spironolactone 25, torsemide  20mg AVR, hypertension, paroxysmal A-fib (status post ablation 1/14/25) on amiodarone Eliquis  , CAD s/p PCI (2019), , hyperlipidemia, chronic leukocytosis recent admission 4/19/25-4/25/25 forNSTEMI,  fluid overload  -  comes to the emergency room for chest pain that states felt like pressure across his chest followed by shortness of breath which worsened over the next  3 hours.    S: no chest pain or dypnea; sitting comfortably in bed with 1:1 in place.     Review of Systems:   Constitutional: [ ] fevers, [ ] chills.   Skin: [ ] dry skin. [ ] rashes.  Psychiatric: [ ] depression, [ ] anxiety.   Gastrointestinal: [ ] BRBPR, [ ] melena.   Neurological: [ ] confusion. [ ] seizures. [ ] shuffling gait.   Ears,Nose,Mouth and Throat: [ ] ear pain [ ] sore throat.   Eyes: [ ] diplopia.   Respiratory: [ ] hemoptysis. [ ] shortness of breath  Cardiovascular: See HPI above  Hematologic/Lymphatic: [ ] anemia. [ ] painful nodes. [ ] prolonged bleeding.   Genitourinary: [ ] hematuria. [ ] flank pain.   Endocrine: [ ] significant change in weight. [ ] intolerance to heat and cold.     Review of systems [x ] otherwise negative, [ ] otherwise unable to obtain    FH: no family history of sudden cardiac death in first degree relatives    SH: [ ] tobacco, [ ] alcohol, [ ] drugs    aMIOdarone    Tablet 200 milliGRAM(s) Oral daily  apixaban 5 milliGRAM(s) Oral two times a day  atorvastatin 40 milliGRAM(s) Oral at bedtime  chlorhexidine 2% Cloths 1 Application(s) Topical <User Schedule>  clopidogrel Tablet 75 milliGRAM(s) Oral daily  dapagliflozin 10 milliGRAM(s) Oral daily  dextrose 5%. 1000 milliLiter(s) IV Continuous <Continuous>  dextrose 5%. 1000 milliLiter(s) IV Continuous <Continuous>  dextrose 50% Injectable 25 Gram(s) IV Push once  dextrose 50% Injectable 12.5 Gram(s) IV Push once  dextrose 50% Injectable 25 Gram(s) IV Push once  dextrose Oral Gel 15 Gram(s) Oral once PRN  diphenhydrAMINE 50 milliGRAM(s) Oral every 6 hours PRN  ezetimibe 10 milliGRAM(s) Oral daily  famotidine    Tablet 20 milliGRAM(s) Oral daily  fenofibrate Tablet 145 milliGRAM(s) Oral daily  glucagon  Injectable 1 milliGRAM(s) IntraMuscular once  guaiFENesin  milliGRAM(s) Oral every 12 hours PRN  haloperidol     Tablet 5 milliGRAM(s) Oral every 6 hours PRN  insulin glargine Injectable (LANTUS) 32 Unit(s) SubCutaneous every morning  insulin lispro (ADMELOG) corrective regimen sliding scale   SubCutaneous three times a day before meals  insulin lispro (ADMELOG) corrective regimen sliding scale   SubCutaneous at bedtime  LORazepam     Tablet 2 milliGRAM(s) Oral every 6 hours PRN  melatonin 5 milliGRAM(s) Oral at bedtime  metoprolol succinate ER 25 milliGRAM(s) Oral daily  sacubitril 24 mG/valsartan 26 mG 1 Tablet(s) Oral two times a day  spironolactone 25 milliGRAM(s) Oral every 24 hours  torsemide 20 milliGRAM(s) Oral every 12 hours                  CARDIAC MARKERS ( 13 May 2025 22:00 )  x     / x     / x     / x     / 6.1 ng/mL  CARDIAC MARKERS ( 13 May 2025 10:40 )  x     / x     / x     / x     / 6.2 ng/mL        T(C): 37.3 (05-15-25 @ 05:18), Max: 37.3 (05-15-25 @ 05:18)  HR: 87 (05-15-25 @ 05:18) (87 - 99)  BP: 123/82 (05-15-25 @ 05:18) (123/82 - 164/80)  RR: 18 (05-15-25 @ 05:18) (18 - 18)  SpO2: 95% (05-15-25 @ 05:18) (93% - 95%)  Wt(kg): --    I&O's Summary    14 May 2025 07:01  -  15 May 2025 07:00  --------------------------------------------------------  IN: 0 mL / OUT: 1050 mL / NET: -1050 mL        General: Well nourished in no acute distress. Alert and Oriented * 3.   Head: Normocephalic and atraumatic.   Neck: No JVD. No bruits. Supple. Does not appear to be enlarged.   Cardiovascular: + S1,S2 ; RRR Soft systolic murmur at the left lower sternal border. No rubs noted.    Lungs: CTA b/l. No rhonchi, rales or wheezes.   Abdomen: + BS, soft. Non tender. Non distended. No rebound. No guarding.   Extremities: No clubbing/cyanosis/edema.   Neurologic: Moves all four extremities. Full range of motion.   Skin: Warm and moist. The patient's skin has normal elasticity and good skin turgor.   Musculoskeletal: Normal range of motion, normal strength    Tele:      TTE:    1. LV Ejection Fraction by Beck's Method with a biplane EF of 30 %.   2. Severely decreased global left ventricular systolic function.   3. Endocardial visualization was enhanced with intravenous echo contrast.   4. Left atrial enlargement.   5. Mildly reduced RV systolic function.   6. Mild mitral valve regurgitation.   7. Thickening of the anterior and posterior mitral valve leaflets.   8. Moderate tricuspid regurgitation.   9. Bioprosthetic aortic valve in place. Peak/mean [id number]/15.3   mmHg.  10. Estimated pulmonary artery systolic pressure is 59.9 mmHg assuming a   right atrial pressure of 3 mmHg, which is consistent with moderate   pulmonary hypertension.  11. Likely PFO noted by color doppler wtih left to right shunt.    A/P:  70 year old male with past medical history of  COPD (on 2L home O2), diabetes HFrEF 43%, ischemic cardiomyopathy s/p CRT-D, (Entresto, spironolactone 25, torsemide  20mg AVR, hypertension, paroxysmal A-fib (status post ablation 1/14/25) on amiodarone Eliquis  , CAD s/p PCI (2019), , hyperlipidemia, chronic leukocytosis recent admission 4/19/25-4/25/25 forNSTEMI,  fluid overload  -  comes to the emergency room for chest pain and dyspnea.     -pt. chest pain free  -prior TTE with severe LV dysfunction - pt. with known ICM   -appears euvolemic today - continue with po diuretics   -continue with lifelong ac for cva prevention   -suspect heart failure exacerbation in setting of dietary indiscretion  -continue with sapt and statin for history of PCI  -follow up psych  -further workup pending clinical course    Armando Herron MD 
Patient is a 70y old  Male who presents with a chief complaint of     Pt seen and examined at bedside. No CP or SOB.          PAST MEDICAL & SURGICAL HISTORY:  CHF (congestive heart failure)    Diabetes    CAD (coronary artery disease)    Chronic obstructive pulmonary disease (COPD)    HTN (hypertension)    Stented coronary artery    Dyslipidemia    GERD (gastroesophageal reflux disease)    Afib    CVA (cerebral vascular accident)    H/O heart artery stent    Heart valve replaced  aortic    History of Nissen fundoplication        VITAL SIGNS (Last 24 hrs):  T(C): 36.4 (05-14-25 @ 05:01), Max: 36.7 (05-13-25 @ 20:40)  HR: 72 (05-14-25 @ 05:01) (72 - 95)  BP: 125/74 (05-14-25 @ 05:01) (125/74 - 174/89)  RR: 18 (05-14-25 @ 05:01) (18 - 18)  SpO2: 98% (05-14-25 @ 05:01) (93% - 98%)  Wt(kg): --  Daily     Daily     I&O's Summary    13 May 2025 07:01  -  14 May 2025 07:00  --------------------------------------------------------  IN: 240 mL / OUT: 1800 mL / NET: -1560 mL        PHYSICAL EXAM:  GENERAL: NAD, well-developed  HEAD:  Atraumatic, Normocephalic  EYES: EOMI, PERRLA, conjunctiva and sclera clear  NECK: Supple, No JVD  CHEST/LUNG: Clear to auscultation bilaterally; No wheeze  HEART: Regular rate and rhythm; No murmurs, rubs, or gallops  ABDOMEN: Soft, Nontender, Nondistended; Bowel sounds present  EXTREMITIES:  2+ Peripheral Pulses, No clubbing, cyanosis, or edema  PSYCH: AAOx3  NEUROLOGY: non-focal  SKIN: No rashes or lesions    Labs Reviewed  Spoke to patient in regards to abnormal labs.    CBC Full  -  ( 13 May 2025 10:40 )  WBC Count : 15.13 K/uL  Hemoglobin : 12.5 g/dL  Hematocrit : 40.0 %  Platelet Count - Automated : 389 K/uL  Mean Cell Volume : 84.6 fL  Mean Cell Hemoglobin : 26.4 pg  Mean Cell Hemoglobin Concentration : 31.3 g/dL  Auto Neutrophil # : x  Auto Lymphocyte # : x  Auto Monocyte # : x  Auto Eosinophil # : x  Auto Basophil # : x  Auto Neutrophil % : x  Auto Lymphocyte % : x  Auto Monocyte % : x  Auto Eosinophil % : x  Auto Basophil % : x    BMP:    05-13 @ 10:40    Blood Urea Nitrogen - 17  Calcium - 9.5  Carbond Dioxide - 25  Chloride - 103  Creatinine - 1.2  Glucose - 226  Potassium - 4.3  Sodium - 140      Hemoglobin A1c -     Urine Culture:        COVID Labs  CRP:      D-Dimer:            Imaging reviewed independently and reviewed read        MEDICATIONS  (STANDING):  aMIOdarone    Tablet 200 milliGRAM(s) Oral daily  apixaban 5 milliGRAM(s) Oral two times a day  atorvastatin 40 milliGRAM(s) Oral at bedtime  chlorhexidine 2% Cloths 1 Application(s) Topical <User Schedule>  clopidogrel Tablet 75 milliGRAM(s) Oral daily  dapagliflozin 10 milliGRAM(s) Oral daily  dextrose 5%. 1000 milliLiter(s) (100 mL/Hr) IV Continuous <Continuous>  dextrose 5%. 1000 milliLiter(s) (50 mL/Hr) IV Continuous <Continuous>  dextrose 50% Injectable 25 Gram(s) IV Push once  dextrose 50% Injectable 12.5 Gram(s) IV Push once  dextrose 50% Injectable 25 Gram(s) IV Push once  ezetimibe 10 milliGRAM(s) Oral daily  famotidine    Tablet 20 milliGRAM(s) Oral daily  fenofibrate Tablet 145 milliGRAM(s) Oral daily  glucagon  Injectable 1 milliGRAM(s) IntraMuscular once  insulin glargine Injectable (LANTUS) 32 Unit(s) SubCutaneous every morning  insulin lispro (ADMELOG) corrective regimen sliding scale   SubCutaneous three times a day before meals  insulin lispro (ADMELOG) corrective regimen sliding scale   SubCutaneous at bedtime  melatonin 5 milliGRAM(s) Oral at bedtime  metoprolol succinate ER 25 milliGRAM(s) Oral daily  sacubitril 24 mG/valsartan 26 mG 1 Tablet(s) Oral two times a day  spironolactone 25 milliGRAM(s) Oral every 24 hours  torsemide 20 milliGRAM(s) Oral every 12 hours    MEDICATIONS  (PRN):  dextrose Oral Gel 15 Gram(s) Oral once PRN Blood Glucose LESS THAN 70 milliGRAM(s)/deciliter  diphenhydrAMINE 50 milliGRAM(s) Oral every 6 hours PRN Combative behavior  guaiFENesin  milliGRAM(s) Oral every 12 hours PRN Cough/congestion  haloperidol     Tablet 5 milliGRAM(s) Oral every 6 hours PRN agitation  LORazepam     Tablet 2 milliGRAM(s) Oral every 6 hours PRN Agitation      
Patient is a 70y old  Male who presents with a chief complaint of     Pt seen and examined at bedside. No CP or SOB.          PAST MEDICAL & SURGICAL HISTORY:  CHF (congestive heart failure)    Diabetes    CAD (coronary artery disease)    Chronic obstructive pulmonary disease (COPD)    HTN (hypertension)    Stented coronary artery    Dyslipidemia    GERD (gastroesophageal reflux disease)    Afib    CVA (cerebral vascular accident)    H/O heart artery stent    Heart valve replaced  aortic    History of Nissen fundoplication        VITAL SIGNS (Last 24 hrs):  T(C): 36.6 (05-16-25 @ 05:01), Max: 37.4 (05-15-25 @ 14:31)  HR: 93 (05-16-25 @ 05:01) (87 - 93)  BP: 129/64 (05-16-25 @ 05:01) (102/72 - 135/65)  RR: 18 (05-16-25 @ 05:01) (18 - 18)  SpO2: 96% (05-16-25 @ 05:01) (96% - 98%)  Wt(kg): --  Daily     Daily     I&O's Summary    15 May 2025 07:01  -  16 May 2025 07:00  --------------------------------------------------------  IN: 0 mL / OUT: 1200 mL / NET: -1200 mL        PHYSICAL EXAM:  GENERAL: NAD, well-developed  HEAD:  Atraumatic, Normocephalic  EYES: EOMI, PERRLA, conjunctiva and sclera clear  NECK: Supple, No JVD  CHEST/LUNG: Clear to auscultation bilaterally; No wheeze  HEART: Regular rate and rhythm; No murmurs, rubs, or gallops  ABDOMEN: Soft, Nontender, Nondistended; Bowel sounds present  EXTREMITIES:  2+ Peripheral Pulses, No clubbing, cyanosis, or edema  PSYCH: AAOx3  NEUROLOGY: non-focal  SKIN: No rashes or lesions    Labs Reviewed  Spoke to patient in regards to abnormal labs.    CBC Full  -  ( 13 May 2025 10:40 )  WBC Count : 15.13 K/uL  Hemoglobin : 12.5 g/dL  Hematocrit : 40.0 %  Platelet Count - Automated : 389 K/uL  Mean Cell Volume : 84.6 fL  Mean Cell Hemoglobin : 26.4 pg  Mean Cell Hemoglobin Concentration : 31.3 g/dL  Auto Neutrophil # : x  Auto Lymphocyte # : x  Auto Monocyte # : x  Auto Eosinophil # : x  Auto Basophil # : x  Auto Neutrophil % : x  Auto Lymphocyte % : x  Auto Monocyte % : x  Auto Eosinophil % : x  Auto Basophil % : x    BMP:    05-13 @ 10:40    Blood Urea Nitrogen - 17  Calcium - 9.5  Carbond Dioxide - 25  Chloride - 103  Creatinine - 1.2  Glucose - 226  Potassium - 4.3  Sodium - 140      Hemoglobin A1c -     Urine Culture:        COVID Labs  CRP:      D-Dimer:            Imaging reviewed independently and reviewed read        MEDICATIONS  (STANDING):  aMIOdarone    Tablet 200 milliGRAM(s) Oral daily  apixaban 5 milliGRAM(s) Oral two times a day  atorvastatin 40 milliGRAM(s) Oral at bedtime  chlorhexidine 2% Cloths 1 Application(s) Topical <User Schedule>  clopidogrel Tablet 75 milliGRAM(s) Oral daily  dapagliflozin 10 milliGRAM(s) Oral daily  dextrose 5%. 1000 milliLiter(s) (100 mL/Hr) IV Continuous <Continuous>  dextrose 5%. 1000 milliLiter(s) (50 mL/Hr) IV Continuous <Continuous>  dextrose 50% Injectable 25 Gram(s) IV Push once  dextrose 50% Injectable 12.5 Gram(s) IV Push once  dextrose 50% Injectable 25 Gram(s) IV Push once  ezetimibe 10 milliGRAM(s) Oral daily  famotidine    Tablet 20 milliGRAM(s) Oral daily  fenofibrate Tablet 145 milliGRAM(s) Oral daily  glucagon  Injectable 1 milliGRAM(s) IntraMuscular once  insulin glargine Injectable (LANTUS) 32 Unit(s) SubCutaneous every morning  insulin lispro (ADMELOG) corrective regimen sliding scale   SubCutaneous three times a day before meals  insulin lispro (ADMELOG) corrective regimen sliding scale   SubCutaneous at bedtime  melatonin 5 milliGRAM(s) Oral at bedtime  metoprolol succinate ER 50 milliGRAM(s) Oral daily  sacubitril 24 mG/valsartan 26 mG 1 Tablet(s) Oral two times a day  spironolactone 25 milliGRAM(s) Oral every 24 hours  torsemide 20 milliGRAM(s) Oral every 12 hours    MEDICATIONS  (PRN):  dextrose Oral Gel 15 Gram(s) Oral once PRN Blood Glucose LESS THAN 70 milliGRAM(s)/deciliter  diphenhydrAMINE 50 milliGRAM(s) Oral every 6 hours PRN Combative behavior  guaiFENesin  milliGRAM(s) Oral every 12 hours PRN Cough/congestion  haloperidol     Tablet 5 milliGRAM(s) Oral every 6 hours PRN agitation  LORazepam     Tablet 2 milliGRAM(s) Oral every 6 hours PRN Agitation

## 2025-05-16 NOTE — BH CONSULTATION LIAISON PROGRESS NOTE - NSBHCONSULTRECOMMENDOTHER_PSY_A_CORE FT
1. There is no acute indication for starting any psychotropic medications for now   2. We recommend melatonin 5 –10 mg P.O bedtime to regulate sleep wake cycle   3. We recommend behavioral interventions, and environmental modifications to help patient's orientation and help him feel safe in addition to implementing delirium precautions as per nursing staff.    4. We recommend avoiding or cautious use of medications that can worsen delirium in this case such as benzodiazepines, anticholinergic agents, opioid pain medications   5. Patient will benefit from supportive psychotherapy to help him develop better coping skills and better frustration tolerance to address his stressors on outpatient basis.  6. No further psychiatric intervention is indicated for now , Please recall as needed

## 2025-05-22 ENCOUNTER — NON-APPOINTMENT (OUTPATIENT)
Age: 70
End: 2025-05-22

## 2025-05-22 ENCOUNTER — APPOINTMENT (OUTPATIENT)
Dept: CARDIOLOGY | Facility: CLINIC | Age: 70
End: 2025-05-22
Payer: MEDICARE

## 2025-05-22 DIAGNOSIS — J44.9 CHRONIC OBSTRUCTIVE PULMONARY DISEASE, UNSPECIFIED: ICD-10-CM

## 2025-05-22 DIAGNOSIS — Z95.5 PRESENCE OF CORONARY ANGIOPLASTY IMPLANT AND GRAFT: ICD-10-CM

## 2025-05-22 DIAGNOSIS — Z95.810 PRESENCE OF AUTOMATIC (IMPLANTABLE) CARDIAC DEFIBRILLATOR: ICD-10-CM

## 2025-05-22 DIAGNOSIS — I50.23 ACUTE ON CHRONIC SYSTOLIC (CONGESTIVE) HEART FAILURE: ICD-10-CM

## 2025-05-22 DIAGNOSIS — I11.0 HYPERTENSIVE HEART DISEASE WITH HEART FAILURE: ICD-10-CM

## 2025-05-22 DIAGNOSIS — E11.9 TYPE 2 DIABETES MELLITUS WITHOUT COMPLICATIONS: ICD-10-CM

## 2025-05-22 DIAGNOSIS — R45.851 SUICIDAL IDEATIONS: ICD-10-CM

## 2025-05-22 DIAGNOSIS — I21.4 NON-ST ELEVATION (NSTEMI) MYOCARDIAL INFARCTION: ICD-10-CM

## 2025-05-22 DIAGNOSIS — I25.10 ATHEROSCLEROTIC HEART DISEASE OF NATIVE CORONARY ARTERY WITHOUT ANGINA PECTORIS: ICD-10-CM

## 2025-05-22 DIAGNOSIS — J96.01 ACUTE RESPIRATORY FAILURE WITH HYPOXIA: ICD-10-CM

## 2025-05-22 PROCEDURE — 93295 DEV INTERROG REMOTE 1/2/MLT: CPT

## 2025-05-22 PROCEDURE — 93296 REM INTERROG EVL PM/IDS: CPT

## 2025-05-24 ENCOUNTER — INPATIENT (INPATIENT)
Facility: HOSPITAL | Age: 70
LOS: 0 days | Discharge: HOME HEALTH | DRG: 313 | End: 2025-05-25
Attending: INTERNAL MEDICINE | Admitting: INTERNAL MEDICINE
Payer: MEDICARE

## 2025-05-24 VITALS
TEMPERATURE: 98 F | OXYGEN SATURATION: 99 % | RESPIRATION RATE: 18 BRPM | HEIGHT: 63 IN | SYSTOLIC BLOOD PRESSURE: 170 MMHG | HEART RATE: 86 BPM | DIASTOLIC BLOOD PRESSURE: 76 MMHG

## 2025-05-24 DIAGNOSIS — Z98.890 OTHER SPECIFIED POSTPROCEDURAL STATES: Chronic | ICD-10-CM

## 2025-05-24 DIAGNOSIS — Z99.81 DEPENDENCE ON SUPPLEMENTAL OXYGEN: ICD-10-CM

## 2025-05-24 DIAGNOSIS — Z95.2 PRESENCE OF PROSTHETIC HEART VALVE: Chronic | ICD-10-CM

## 2025-05-24 DIAGNOSIS — I25.5 ISCHEMIC CARDIOMYOPATHY: ICD-10-CM

## 2025-05-24 DIAGNOSIS — Z95.5 PRESENCE OF CORONARY ANGIOPLASTY IMPLANT AND GRAFT: Chronic | ICD-10-CM

## 2025-05-24 DIAGNOSIS — Z87.891 PERSONAL HISTORY OF NICOTINE DEPENDENCE: ICD-10-CM

## 2025-05-24 DIAGNOSIS — E78.5 HYPERLIPIDEMIA, UNSPECIFIED: ICD-10-CM

## 2025-05-24 DIAGNOSIS — Z79.01 LONG TERM (CURRENT) USE OF ANTICOAGULANTS: ICD-10-CM

## 2025-05-24 LAB — GAS PNL BLDV: SIGNIFICANT CHANGE UP

## 2025-05-24 PROCEDURE — 93010 ELECTROCARDIOGRAM REPORT: CPT

## 2025-05-24 PROCEDURE — 99285 EMERGENCY DEPT VISIT HI MDM: CPT

## 2025-05-24 NOTE — ED PROVIDER NOTE - ATTENDING APP SHARED VISIT CONTRIBUTION OF CARE
70-year-old male former smoker history of COPD on reported 5 L O2, diabetes, ischemic cardiomyopathy with an ejection fraction of 47% with severe left ventricular dysfunction, A-fib status post CRT D on amiodarone and Eliquis, brought in by EMS for evaluation, was in the bathroom when he felt a sudden pain to his chest,?  Defibrillator firing, fell to his knees no head strike no injury, was also short of breath at that time, now feels better and wants to go home, no fever, no cough, no leg pain or swelling, reports medication compliance, was just discharged 8 days ago from the hospital after admission for CHF exacerbation chest pain, on exam vitals appreciated, nontoxic-appearing, head NC/AT, PERRLA, conjunctive pink, neck supple, cor regular with extrasystoles, lungs with diminished breath sounds left base and crackles at right base no wheeze, abdomen soft nontender, calves nontender, no clubbing cyanosis or edema, pulse equal, neurologically intact, will check EKG labs chest x-ray Medtr[employer] interrogation reassess

## 2025-05-24 NOTE — ED PROVIDER NOTE - OBJECTIVE STATEMENT
70-year-old male past medical history of COPD on 5 L nasal cannula, diabetes, cardiomyopathy with AICD, A-fib on Eliquis presents for evaluation of chest pain.  Patient states he was in an argument with his wife when he developed chest pain and felt that his defibrillator was firing leading him to call EMS.  When EMS arrived patient states that chest pain had resolved.  Denies headache, cough, shortness of breath, abdominal pain, dysuria, hematuria, vomiting, diarrhea.

## 2025-05-24 NOTE — ED ADULT TRIAGE NOTE - CHIEF COMPLAINT QUOTE
PT was fighting with wife and started having chest pain. PT states that chest pain relieved when arrived in the ED. PT suddenly stated that his pace maker is going off @ 3600

## 2025-05-24 NOTE — ED ADULT TRIAGE NOTE - ESI TRIAGE ACUITY LEVEL, MLM
"    Reason for Disposition   Caller has NON-URGENT medication question about med that PCP prescribed and triager unable to answer question    Protocols used: MEDICATION QUESTION CALL-A-ANCELMO Roblero states she is in a "donut hole" with her insurance. She has a current prescription for Lyrica but cannot fill it due to insurance issue. She was concerned about withdrawal. She states she has cold sweats but denies other symptoms such as weakness/dizziness,, nausea vomiting, shaking. She says she is still going about normal activities. Advised triage nurse can only send a message regarding her concern for office to follow up on Monday since it is an insurance issue. Discussed if she prefers she can be seen in ED for her symptoms.  " 2

## 2025-05-24 NOTE — ED PROVIDER NOTE - PHYSICAL EXAMINATION
CONST: NAD   CARD: S1 S2; No jvd  RESP: B/L crackles. spO2 98% on 5L NC  GI: Soft, non-tender, non-distended. no cva tenderness. normal BS  MS: Normal ROM in all extremities. pulses 2 +. B/L LE edema  SKIN: Warm, dry, no acute rashes. Good turgor

## 2025-05-24 NOTE — ED ADULT NURSE NOTE - CHIEF COMPLAINT QUOTE
PT was fighting with wife and started having chest pain. PT states that chest pain relieved when arrived in the ED. PT suddenly stated that his pace maker is going off @ 4595

## 2025-05-25 VITALS
DIASTOLIC BLOOD PRESSURE: 73 MMHG | OXYGEN SATURATION: 95 % | SYSTOLIC BLOOD PRESSURE: 146 MMHG | HEART RATE: 69 BPM | TEMPERATURE: 98 F | RESPIRATION RATE: 16 BRPM

## 2025-05-25 DIAGNOSIS — R07.9 CHEST PAIN, UNSPECIFIED: ICD-10-CM

## 2025-05-25 LAB
ALBUMIN SERPL ELPH-MCNC: 3.7 G/DL — SIGNIFICANT CHANGE UP (ref 3.5–5.2)
ALP SERPL-CCNC: 98 U/L — SIGNIFICANT CHANGE UP (ref 30–115)
ALT FLD-CCNC: 12 U/L — SIGNIFICANT CHANGE UP (ref 0–41)
ANION GAP SERPL CALC-SCNC: 11 MMOL/L — SIGNIFICANT CHANGE UP (ref 7–14)
ANION GAP SERPL CALC-SCNC: 14 MMOL/L — SIGNIFICANT CHANGE UP (ref 7–14)
APTT BLD: 27.8 SEC — SIGNIFICANT CHANGE UP (ref 27–39.2)
AST SERPL-CCNC: 25 U/L — SIGNIFICANT CHANGE UP (ref 0–41)
B-OH-BUTYR SERPL-SCNC: <0.2 MMOL/L — SIGNIFICANT CHANGE UP
BASOPHILS # BLD AUTO: 0.11 K/UL — SIGNIFICANT CHANGE UP (ref 0–0.2)
BASOPHILS # BLD AUTO: 0.12 K/UL — SIGNIFICANT CHANGE UP (ref 0–0.2)
BASOPHILS NFR BLD AUTO: 0.8 % — SIGNIFICANT CHANGE UP (ref 0–1)
BASOPHILS NFR BLD AUTO: 0.8 % — SIGNIFICANT CHANGE UP (ref 0–1)
BILIRUB SERPL-MCNC: 0.2 MG/DL — SIGNIFICANT CHANGE UP (ref 0.2–1.2)
BUN SERPL-MCNC: 21 MG/DL — HIGH (ref 10–20)
BUN SERPL-MCNC: 21 MG/DL — HIGH (ref 10–20)
CALCIUM SERPL-MCNC: 9.4 MG/DL — SIGNIFICANT CHANGE UP (ref 8.4–10.5)
CALCIUM SERPL-MCNC: 9.8 MG/DL — SIGNIFICANT CHANGE UP (ref 8.4–10.5)
CHLORIDE SERPL-SCNC: 101 MMOL/L — SIGNIFICANT CHANGE UP (ref 98–110)
CHLORIDE SERPL-SCNC: 103 MMOL/L — SIGNIFICANT CHANGE UP (ref 98–110)
CO2 SERPL-SCNC: 19 MMOL/L — SIGNIFICANT CHANGE UP (ref 17–32)
CO2 SERPL-SCNC: 24 MMOL/L — SIGNIFICANT CHANGE UP (ref 17–32)
CREAT SERPL-MCNC: 1.3 MG/DL — SIGNIFICANT CHANGE UP (ref 0.7–1.5)
CREAT SERPL-MCNC: 1.3 MG/DL — SIGNIFICANT CHANGE UP (ref 0.7–1.5)
EGFR: 59 ML/MIN/1.73M2 — LOW
EOSINOPHIL # BLD AUTO: 0.36 K/UL — SIGNIFICANT CHANGE UP (ref 0–0.7)
EOSINOPHIL # BLD AUTO: 0.41 K/UL — SIGNIFICANT CHANGE UP (ref 0–0.7)
EOSINOPHIL NFR BLD AUTO: 2.7 % — SIGNIFICANT CHANGE UP (ref 0–8)
EOSINOPHIL NFR BLD AUTO: 2.8 % — SIGNIFICANT CHANGE UP (ref 0–8)
GLUCOSE BLDC GLUCOMTR-MCNC: 213 MG/DL — HIGH (ref 70–99)
GLUCOSE SERPL-MCNC: 164 MG/DL — HIGH (ref 70–99)
GLUCOSE SERPL-MCNC: 253 MG/DL — HIGH (ref 70–99)
HCT VFR BLD CALC: 37.4 % — LOW (ref 42–52)
HCT VFR BLD CALC: 40.5 % — LOW (ref 42–52)
HGB BLD-MCNC: 11.8 G/DL — LOW (ref 14–18)
HGB BLD-MCNC: 12.6 G/DL — LOW (ref 14–18)
IMM GRANULOCYTES NFR BLD AUTO: 0.7 % — HIGH (ref 0.1–0.3)
IMM GRANULOCYTES NFR BLD AUTO: 0.8 % — HIGH (ref 0.1–0.3)
INR BLD: 0.98 RATIO — SIGNIFICANT CHANGE UP (ref 0.65–1.3)
LYMPHOCYTES # BLD AUTO: 1.54 K/UL — SIGNIFICANT CHANGE UP (ref 1.2–3.4)
LYMPHOCYTES # BLD AUTO: 1.68 K/UL — SIGNIFICANT CHANGE UP (ref 1.2–3.4)
LYMPHOCYTES # BLD AUTO: 11.5 % — LOW (ref 20.5–51.1)
LYMPHOCYTES # BLD AUTO: 11.6 % — LOW (ref 20.5–51.1)
MAGNESIUM SERPL-MCNC: 1.9 MG/DL — SIGNIFICANT CHANGE UP (ref 1.8–2.4)
MCHC RBC-ENTMCNC: 26.1 PG — LOW (ref 27–31)
MCHC RBC-ENTMCNC: 26.3 PG — LOW (ref 27–31)
MCHC RBC-ENTMCNC: 31.1 G/DL — LOW (ref 32–37)
MCHC RBC-ENTMCNC: 31.6 G/DL — LOW (ref 32–37)
MCV RBC AUTO: 83.5 FL — SIGNIFICANT CHANGE UP (ref 80–94)
MCV RBC AUTO: 84 FL — SIGNIFICANT CHANGE UP (ref 80–94)
MONOCYTES # BLD AUTO: 0.7 K/UL — HIGH (ref 0.1–0.6)
MONOCYTES # BLD AUTO: 0.83 K/UL — HIGH (ref 0.1–0.6)
MONOCYTES NFR BLD AUTO: 5.3 % — SIGNIFICANT CHANGE UP (ref 1.7–9.3)
MONOCYTES NFR BLD AUTO: 5.7 % — SIGNIFICANT CHANGE UP (ref 1.7–9.3)
NEUTROPHILS # BLD AUTO: 10.51 K/UL — HIGH (ref 1.4–6.5)
NEUTROPHILS # BLD AUTO: 11.46 K/UL — HIGH (ref 1.4–6.5)
NEUTROPHILS NFR BLD AUTO: 78.5 % — HIGH (ref 42.2–75.2)
NEUTROPHILS NFR BLD AUTO: 78.8 % — HIGH (ref 42.2–75.2)
NRBC BLD AUTO-RTO: 0 /100 WBCS — SIGNIFICANT CHANGE UP (ref 0–0)
NRBC BLD AUTO-RTO: 0 /100 WBCS — SIGNIFICANT CHANGE UP (ref 0–0)
NT-PROBNP SERPL-SCNC: 2455 PG/ML — HIGH (ref 0–300)
PLATELET # BLD AUTO: 325 K/UL — SIGNIFICANT CHANGE UP (ref 130–400)
PLATELET # BLD AUTO: 343 K/UL — SIGNIFICANT CHANGE UP (ref 130–400)
PMV BLD: 10.9 FL — HIGH (ref 7.4–10.4)
PMV BLD: 11.1 FL — HIGH (ref 7.4–10.4)
POTASSIUM SERPL-MCNC: 4.6 MMOL/L — SIGNIFICANT CHANGE UP (ref 3.5–5)
POTASSIUM SERPL-MCNC: 5.1 MMOL/L — HIGH (ref 3.5–5)
POTASSIUM SERPL-SCNC: 4.6 MMOL/L — SIGNIFICANT CHANGE UP (ref 3.5–5)
POTASSIUM SERPL-SCNC: 5.1 MMOL/L — HIGH (ref 3.5–5)
PROT SERPL-MCNC: 6.8 G/DL — SIGNIFICANT CHANGE UP (ref 6–8)
PROTHROM AB SERPL-ACNC: 11.6 SEC — SIGNIFICANT CHANGE UP (ref 9.95–12.87)
RBC # BLD: 4.48 M/UL — LOW (ref 4.7–6.1)
RBC # BLD: 4.82 M/UL — SIGNIFICANT CHANGE UP (ref 4.7–6.1)
RBC # FLD: 14.6 % — HIGH (ref 11.5–14.5)
RBC # FLD: 14.8 % — HIGH (ref 11.5–14.5)
SODIUM SERPL-SCNC: 134 MMOL/L — LOW (ref 135–146)
SODIUM SERPL-SCNC: 138 MMOL/L — SIGNIFICANT CHANGE UP (ref 135–146)
TROPONIN T, HIGH SENSITIVITY RESULT: 89 NG/L — CRITICAL HIGH (ref 6–21)
TROPONIN T, HIGH SENSITIVITY RESULT: 91 NG/L — CRITICAL HIGH (ref 6–21)
TROPONIN T, HIGH SENSITIVITY RESULT: 99 NG/L — CRITICAL HIGH (ref 6–21)
WBC # BLD: 13.32 K/UL — HIGH (ref 4.8–10.8)
WBC # BLD: 14.6 K/UL — HIGH (ref 4.8–10.8)
WBC # FLD AUTO: 13.32 K/UL — HIGH (ref 4.8–10.8)
WBC # FLD AUTO: 14.6 K/UL — HIGH (ref 4.8–10.8)

## 2025-05-25 PROCEDURE — 71045 X-RAY EXAM CHEST 1 VIEW: CPT | Mod: 26

## 2025-05-25 PROCEDURE — 93005 ELECTROCARDIOGRAM TRACING: CPT

## 2025-05-25 PROCEDURE — 85025 COMPLETE CBC W/AUTO DIFF WBC: CPT

## 2025-05-25 PROCEDURE — 82962 GLUCOSE BLOOD TEST: CPT

## 2025-05-25 PROCEDURE — 99222 1ST HOSP IP/OBS MODERATE 55: CPT

## 2025-05-25 PROCEDURE — 99223 1ST HOSP IP/OBS HIGH 75: CPT

## 2025-05-25 PROCEDURE — 84484 ASSAY OF TROPONIN QUANT: CPT

## 2025-05-25 PROCEDURE — 80048 BASIC METABOLIC PNL TOTAL CA: CPT

## 2025-05-25 PROCEDURE — 93010 ELECTROCARDIOGRAM REPORT: CPT

## 2025-05-25 RX ORDER — CLOPIDOGREL BISULFATE 75 MG/1
75 TABLET, FILM COATED ORAL DAILY
Refills: 0 | Status: DISCONTINUED | OUTPATIENT
Start: 2025-05-25 | End: 2025-05-25

## 2025-05-25 RX ORDER — MELATONIN 5 MG
5 TABLET ORAL AT BEDTIME
Refills: 0 | Status: DISCONTINUED | OUTPATIENT
Start: 2025-05-25 | End: 2025-05-25

## 2025-05-25 RX ORDER — INSULIN GLARGINE-YFGN 100 [IU]/ML
34 INJECTION, SOLUTION SUBCUTANEOUS EVERY MORNING
Refills: 0 | Status: DISCONTINUED | OUTPATIENT
Start: 2025-05-25 | End: 2025-05-25

## 2025-05-25 RX ORDER — GLUCAGON 3 MG/1
1 POWDER NASAL ONCE
Refills: 0 | Status: DISCONTINUED | OUTPATIENT
Start: 2025-05-25 | End: 2025-05-25

## 2025-05-25 RX ORDER — TORSEMIDE 10 MG
20 TABLET ORAL EVERY 12 HOURS
Refills: 0 | Status: DISCONTINUED | OUTPATIENT
Start: 2025-05-25 | End: 2025-05-25

## 2025-05-25 RX ORDER — DEXTROSE 50 % IN WATER 50 %
25 SYRINGE (ML) INTRAVENOUS ONCE
Refills: 0 | Status: DISCONTINUED | OUTPATIENT
Start: 2025-05-25 | End: 2025-05-25

## 2025-05-25 RX ORDER — FUROSEMIDE 10 MG/ML
40 INJECTION INTRAMUSCULAR; INTRAVENOUS DAILY
Refills: 0 | Status: DISCONTINUED | OUTPATIENT
Start: 2025-05-26 | End: 2025-05-25

## 2025-05-25 RX ORDER — METOPROLOL SUCCINATE 50 MG/1
50 TABLET, EXTENDED RELEASE ORAL DAILY
Refills: 0 | Status: DISCONTINUED | OUTPATIENT
Start: 2025-05-25 | End: 2025-05-25

## 2025-05-25 RX ORDER — SODIUM CHLORIDE 9 G/1000ML
1000 INJECTION, SOLUTION INTRAVENOUS
Refills: 0 | Status: DISCONTINUED | OUTPATIENT
Start: 2025-05-25 | End: 2025-05-25

## 2025-05-25 RX ORDER — EZETIMIBE 10 MG/1
10 TABLET ORAL DAILY
Refills: 0 | Status: DISCONTINUED | OUTPATIENT
Start: 2025-05-25 | End: 2025-05-25

## 2025-05-25 RX ORDER — SPIRONOLACTONE 25 MG
25 TABLET ORAL DAILY
Refills: 0 | Status: DISCONTINUED | OUTPATIENT
Start: 2025-05-26 | End: 2025-05-25

## 2025-05-25 RX ORDER — FENOFIBRATE 160 MG/1
145 TABLET ORAL DAILY
Refills: 0 | Status: DISCONTINUED | OUTPATIENT
Start: 2025-05-25 | End: 2025-05-25

## 2025-05-25 RX ORDER — SACUBITRIL AND VALSARTAN 49; 51 MG/1; MG/1
1 TABLET, FILM COATED ORAL
Refills: 0 | Status: DISCONTINUED | OUTPATIENT
Start: 2025-05-26 | End: 2025-05-25

## 2025-05-25 RX ORDER — APIXABAN 2.5 MG/1
5 TABLET, FILM COATED ORAL
Refills: 0 | Status: DISCONTINUED | OUTPATIENT
Start: 2025-05-25 | End: 2025-05-25

## 2025-05-25 RX ORDER — INSULIN LISPRO 100 U/ML
INJECTION, SOLUTION INTRAVENOUS; SUBCUTANEOUS
Refills: 0 | Status: DISCONTINUED | OUTPATIENT
Start: 2025-05-25 | End: 2025-05-25

## 2025-05-25 RX ORDER — DEXTROSE 50 % IN WATER 50 %
12.5 SYRINGE (ML) INTRAVENOUS ONCE
Refills: 0 | Status: DISCONTINUED | OUTPATIENT
Start: 2025-05-25 | End: 2025-05-25

## 2025-05-25 RX ORDER — DEXTROMETHORPHAN HBR, GUAIFENESIN 200 MG/10ML
600 LIQUID ORAL EVERY 12 HOURS
Refills: 0 | Status: DISCONTINUED | OUTPATIENT
Start: 2025-05-25 | End: 2025-05-25

## 2025-05-25 RX ORDER — FUROSEMIDE 10 MG/ML
40 INJECTION INTRAMUSCULAR; INTRAVENOUS ONCE
Refills: 0 | Status: COMPLETED | OUTPATIENT
Start: 2025-05-25 | End: 2025-05-25

## 2025-05-25 RX ORDER — ATORVASTATIN CALCIUM 80 MG/1
40 TABLET, FILM COATED ORAL AT BEDTIME
Refills: 0 | Status: DISCONTINUED | OUTPATIENT
Start: 2025-05-25 | End: 2025-05-25

## 2025-05-25 RX ORDER — TIOTROPIUM BROMIDE INHALATION SPRAY 3.12 UG/1
2 SPRAY, METERED RESPIRATORY (INHALATION) DAILY
Refills: 0 | Status: DISCONTINUED | OUTPATIENT
Start: 2025-05-25 | End: 2025-05-25

## 2025-05-25 RX ORDER — AMIODARONE HYDROCHLORIDE 50 MG/ML
200 INJECTION, SOLUTION INTRAVENOUS DAILY
Refills: 0 | Status: DISCONTINUED | OUTPATIENT
Start: 2025-05-25 | End: 2025-05-25

## 2025-05-25 RX ORDER — TIOTROPIUM BROMIDE INHALATION SPRAY 3.12 UG/1
2 SPRAY, METERED RESPIRATORY (INHALATION)
Qty: 1 | Refills: 0
Start: 2025-05-25 | End: 2025-06-23

## 2025-05-25 RX ORDER — DEXTROSE 50 % IN WATER 50 %
15 SYRINGE (ML) INTRAVENOUS ONCE
Refills: 0 | Status: DISCONTINUED | OUTPATIENT
Start: 2025-05-25 | End: 2025-05-25

## 2025-05-25 RX ADMIN — CLOPIDOGREL BISULFATE 75 MILLIGRAM(S): 75 TABLET, FILM COATED ORAL at 11:38

## 2025-05-25 RX ADMIN — APIXABAN 5 MILLIGRAM(S): 2.5 TABLET, FILM COATED ORAL at 05:30

## 2025-05-25 RX ADMIN — AMIODARONE HYDROCHLORIDE 200 MILLIGRAM(S): 50 INJECTION, SOLUTION INTRAVENOUS at 05:30

## 2025-05-25 RX ADMIN — METOPROLOL SUCCINATE 50 MILLIGRAM(S): 50 TABLET, EXTENDED RELEASE ORAL at 05:30

## 2025-05-25 RX ADMIN — Medication 20 MILLIGRAM(S): at 05:38

## 2025-05-25 RX ADMIN — FUROSEMIDE 40 MILLIGRAM(S): 10 INJECTION INTRAMUSCULAR; INTRAVENOUS at 12:51

## 2025-05-25 RX ADMIN — INSULIN LISPRO 2: 100 INJECTION, SOLUTION INTRAVENOUS; SUBCUTANEOUS at 08:30

## 2025-05-25 RX ADMIN — DEXTROMETHORPHAN HBR, GUAIFENESIN 600 MILLIGRAM(S): 200 LIQUID ORAL at 05:30

## 2025-05-25 RX ADMIN — FENOFIBRATE 145 MILLIGRAM(S): 160 TABLET ORAL at 11:38

## 2025-05-25 RX ADMIN — EZETIMIBE 10 MILLIGRAM(S): 10 TABLET ORAL at 11:38

## 2025-05-25 RX ADMIN — INSULIN GLARGINE-YFGN 34 UNIT(S): 100 INJECTION, SOLUTION SUBCUTANEOUS at 08:28

## 2025-05-25 NOTE — DISCHARGE NOTE PROVIDER - CARE PROVIDERS DIRECT ADDRESSES
[email or web address]

## 2025-05-25 NOTE — DISCHARGE NOTE NURSING/CASE MANAGEMENT/SOCIAL WORK - FINANCIAL ASSISTANCE
[facility] provides services at a reduced cost to those who are determined to be eligible through [facility]’s financial assistance program. Information regarding [facility]’s financial assistance program can be found by going to [email or web address] or by calling [phone].

## 2025-05-25 NOTE — DISCHARGE NOTE PROVIDER - NSDCMRMEDTOKEN_GEN_ALL_CORE_FT
amiodarone 200 mg oral tablet: 1 tab(s) orally once a day  apixaban 5 mg oral tablet: 1 tab(s) orally every 12 hours  atorvastatin 40 mg oral tablet: 1 tab(s) orally once a day  clopidogrel 75 mg oral tablet: 1 tab(s) orally once a day  dapagliflozin 10 mg oral tablet: 1 tab(s) orally once a day  Entresto 24 mg-26 mg oral tablet: 1 tab(s) orally 2 times a day  eszopiclone 3 mg oral tablet: 1 tab(s) orally once a day (at bedtime)  ezetimibe 10 mg oral tablet: 1 orally once a day  famotidine 40 mg oral tablet: 1 tab(s) orally once a day  fenofibrate 145 mg oral tablet: 1 orally once a day  melatonin 5 mg oral tablet: 1 tab(s) orally once a day (at bedtime)  metFORMIN 500 mg oral tablet: 1 tab(s) orally 2 times a day  metoprolol succinate 50 mg oral tablet, extended release: 1 tab(s) orally once a day  Soaanz 20 mg oral tablet: 1 tab(s) orally every 12 hours  spironolactone 25 mg oral tablet: 1 tab(s) orally every 24 hours  tiotropium 2.5 mcg/inh inhalation aerosol: 2 inhaled once a day  Tresiba 100 units/mL subcutaneous solution: 34 unit(s) subcutaneous once a day (in the morning)

## 2025-05-25 NOTE — DISCHARGE NOTE PROVIDER - CARE PROVIDER_API CALL
Chhaya Martin  Internal Medicine  [address] [place] [address]  Phone: [phone]  Fax: [phone]  Follow Up Time: 1 week    cardiology,   follow up with your cardiologist in 1x week  Phone: (   )    -  Fax: (   )    -  Follow Up Time: 1 week   Martin Shaver  Internal Medicine  [address] [place] [id number]  Phone: [phone]  Fax: [phone]  Follow Up Time: 1 week    cardiology,   follow up with your cardiologist in 1x week  Phone: (   )    -  Fax: (   )    -  Follow Up Time: 1 week    Cecilia Carrera  Cardiovascular Disease  [address] [place] [id number]  Phone: [phone]  Fax: [phone]  Follow Up Time: 1 week

## 2025-05-25 NOTE — CONSULT NOTE ADULT - ASSESSMENT
70 year old male with past medical history of  COPD (on 2L home O2), diabetes, HFrEF 30%, ischemic cardiomyopathy s/p CRT-D, AVR, hypertension, paroxysmal A-fib (status post ablation 1/14/25) on amiodarone and Eliquis, CAD s/p PCI (2019), hyperlipidemia, presents to the hospital for eval of possible ICD discharge      Impression:  #ICD discharge?      Plan:  -Pt. currently chest pain free  -[employer]TR[employer] ICD was interrogated in ED - Report shows no events  -Tele shows multiple brief episodes of NSVT. Is on Amio 200mg PO daily. Would increase Metoprolol to 50mg PO BID if BP allows  -Prior TTE with severe LV dysfunction - Pt. with known ICM   -Clinically appears euvolemic  -PAfib: continue with lifelong AC for CVA prevention   -Continue with ASA and Statin for history of CAD/PCI  -Cont GDMT for ICM and cont rest of cardiac home meds

## 2025-05-25 NOTE — CONSULT NOTE ADULT - NS ATTEND AMEND GEN_ALL_CORE FT
The patient was seen and examined . The patient has history of CAD , DM HFrEF 30% on GDMT ischemic CM S/P CRT-d and PAF S/P ablation for this and is Eliquis and amiodarone . The patient had an argument with his wife and felt that his ICD had discharged . Interrogation of his ICD in the ER did not show that the ICD discharged or that  he had antitachycardia pacing . The patient has not had chest pain . His pro BNP was increased and he was placed on Torsemide . According to the chart he was on Entresto and Farxiga  but he was not placed on this on admission .  Had NS VT on telemetry . On exam he appears to be euvolemic . He insists on going home . He has no chest pain or SOB . Would place back on GDMT which he was discharged with on previous admission , He should follow up with his EP Dr. Carrera .

## 2025-05-25 NOTE — PATIENT PROFILE ADULT - NSFALLSECTIONLABEL_GEN_A_CORE
. Birth Control Pills Counseling: Birth Control Pill Counseling: I discussed with the patient the potential side effects of OCPs including but not limited to increased risk of stroke, heart attack, thrombophlebitis, deep venous thrombosis, hepatic adenomas, breast changes, GI upset, headaches, and depression.  The patient verbalized understanding of the proper use and possible adverse effects of OCPs. All of the patient's questions and concerns were addressed.

## 2025-05-25 NOTE — H&P ADULT - NSHPLABSRESULTS_GEN_ALL_CORE
CXR to me is better then prior                           11.8   13.32 )-----------( 325      ( 25 May 2025 00:27 )             37.4     05-25    134[L]  |  101  |  21[H]  ----------------------------<  253[H]  5.1[H]   |  19  |  1.3    Ca    9.4      25 May 2025 00:27  Mg     1.9     05-25    TPro  6.8  /  Alb  3.7  /  TBili  0.2  /  DBili  x   /  AST  25  /  ALT  12  /  AlkPhos  98  05-25          Urinalysis Basic - ( 25 May 2025 00:27 )    Color: x / Appearance: x / SG: x / pH: x  Gluc: 253 mg/dL / Ketone: x  / Bili: x / Urobili: x   Blood: x / Protein: x / Nitrite: x   Leuk Esterase: x / RBC: x / WBC x   Sq Epi: x / Non Sq Epi: x / Bacteria: x      PT/INR - ( 25 May 2025 00:27 )   PT: 11.60 sec;   INR: 0.98 ratio         PTT - ( 25 May 2025 00:27 )  PTT:27.8 sec    POCT Blood Glucose.: 273 mg/dL (24 May 2025 23:33) CXR to me is better then prior                           11.8   13.32 )-----------( 325      ( 25 May 2025 00:27 )             37.4     05-25    134[L]  |  101  |  21[H]  ----------------------------<  253[H]  5.1[H]   |  19  |  1.3    Ca    9.4      25 May 2025 00:27  Mg     1.9     05-25    TPro  6.8  /  Alb  3.7  /  TBili  0.2  /  DBili  x   /  AST  25  /  ALT  12  /  AlkPhos  98  05-25        Urinalysis Basic - ( 25 May 2025 00:27 )    Color: x / Appearance: x / SG: x / pH: x  Gluc: 253 mg/dL / Ketone: x  / Bili: x / Urobili: x   Blood: x / Protein: x / Nitrite: x   Leuk Esterase: x / RBC: x / WBC x   Sq Epi: x / Non Sq Epi: x / Bacteria: x      PT/INR - ( 25 May 2025 00:27 )   PT: 11.60 sec;   INR: 0.98 ratio         PTT - ( 25 May 2025 00:27 )  PTT:27.8 sec    POCT Blood Glucose.: 273 mg/dL (24 May 2025 23:33)

## 2025-05-25 NOTE — DISCHARGE NOTE NURSING/CASE MANAGEMENT/SOCIAL WORK - PATIENT PORTAL LINK FT
You can access the FollowMyHealth Patient Portal offered by [facility] by registering at the following website: [email or web address]. By joining [employer]’s FollowMyHealth portal, you will also be able to view your health information using other applications (apps) compatible with our system.

## 2025-05-25 NOTE — DISCHARGE NOTE PROVIDER - NSDCFUSCHEDAPPT_GEN_ALL_CORE_FT
Diagnosis: COVID-19 [[id number]]   Future Attending Provider: XI PETERSON [[id number]]   Admit to which facility:: [facility] [[id number]]   Reason for IP Medical Treatment  (Clinical interventions that can only be accomplished in the IP setting? ) :: respiratory failure   Plans for Post-Acute care--if anticipated (pick the single best option):: A. No post acute care anticipated at this time   Special Needs:: No Special Needs [1]  
[facility] Physician [facility]  CARDIOLOGY [address] [facility]  Scheduled Appointment: 08/21/2025

## 2025-05-25 NOTE — H&P ADULT - HISTORY OF PRESENT ILLNESS
69yo male with extensive PMH presents to the ER because after an argument with his wife, he felt a sharp pain to his chest which knocked him to the ground. Thinks his "pacemaker" shocked him. No longer has pain and he denies shortness of breath   71yo male with extensive PMH to include HTN, CAD, COPD, CHF, A FIB, DM, DYSLIPIDEMIA, GERD and CVA, presents to the ER because after an argument with his wife, he felt a sharp pain to his chest which knocked him to the ground. Thinks his "pacemaker" shocked him. No longer has pain and he denies shortness of breath

## 2025-05-25 NOTE — DISCHARGE NOTE PROVIDER - HOSPITAL COURSE
69 y/o male with extensive PMH to include HTN, CAD, COPD, CHF, A FIB, DM, DYSLIPIDEMIA, GERD and CVA, presents to the ER because after an argument with his wife, he felt a sharp pain to his chest which knocked him to the ground.    #Sharp pain in chest  -pt monitored on telemetry, troponin lateral, seen by cardio, medically stable from cardio stand point  pt eager to leave today  -AICD interrogated, no events noted    #Chronic HFrEF without exacerbation, mild overload with bilateral pleural effusions improved from last chest xray  s/p Lasix 40mg IVP  Continue other heart failure medications    #DM - SSI    #pAfib - DOAC, amiodarone, metoprolol     #COPD on 5L of O2 at home - Spiriva    #CAD - continue home medications.    Pt medically stable , cleared for dc   anuj attending      Admission HPI:  69yo male with extensive PMH to include HTN, CAD, COPD, CHF, A FIB, DM, DYSLIPIDEMIA, GERD and CVA, presents to the ER because after an argument with his wife, he felt a sharp pain to his chest which knocked him to the ground. Thinks his "pacemaker" shocked him. No longer has pain and he denies shortness of breath      Hospital course:  Cardiology was consulted.  His AICD was interrogated, which did not show any shocks.  He stated he had no chest pain in the hospital.  It is possible that the pain he felt in his chest was related to anxiety from when he was having an argument with his wife.  Cardiology discussed with the patient and stated that from a cardiology standpoint he was able to be discharged.  He is medically stable for discharge home to follow up with his primary care physician, cardiologist, and other outpatient providers.    #Sharp pain in chest  -pt monitored on telemetry, troponin lateral, seen by cardio, medically stable from cardio stand point  pt eager to leave today  -AICD interrogated, no events noted    #Chronic HFrEF without exacerbation, mild overload with bilateral pleural effusions improved from last chest xray  s/p Lasix 40mg IVP  Continue other heart failure medications    #DM - SSI    #pAfib - DOAC, amiodarone, metoprolol     #COPD on 5L of O2 at home - Spiriva    #CAD - continue home medications.    Pt medically stable , cleared for dc   anuj attending

## 2025-05-25 NOTE — CONSULT NOTE ADULT - SUBJECTIVE AND OBJECTIVE BOX
HPI:  69yo male with extensive PMH to include HTN, CAD, COPD, CHF, A FIB, DM, DYSLIPIDEMIA, GERD and CVA, presents to the ER because after an argument with his wife, he felt a sharp pain to his chest which knocked him to the ground. Thinks his "pacemaker" shocked him. No longer has pain and he denies shortness of breath   (25 May 2025 03:28)        HPI-Cardiology   Pt with the above Hx and HPI, evaluated at bedside. Pt presented for eval of possible ICD discharge after Pt had an argument with significant other. Pt states he felt "a shock" in the left upper chest, and states it felt the same, when he was "shocked". Currently at time of visit endorses SOB, but denies having previously. Denies CP or any other cardiac related symptoms. Radiology tests and hospital records, were reviewed, as well as previous notes on this patient.      PAST MEDICAL & SURGICAL HISTORY  CHF (congestive heart failure)    Diabetes    CAD (coronary artery disease)    Chronic obstructive pulmonary disease (COPD)    HTN (hypertension)    Stented coronary artery    Dyslipidemia    GERD (gastroesophageal reflux disease)    Afib    CVA (cerebral vascular accident)    H/O heart artery stent    Heart valve replaced  aortic    History of Nissen fundoplication        ALLERGIES:  Bactrim (Unknown)  sulfa drugs (Unknown)      MEDICATIONS:  MEDICATIONS  (STANDING):  aMIOdarone    Tablet 200 milliGRAM(s) Oral daily  apixaban 5 milliGRAM(s) Oral two times a day  atorvastatin 40 milliGRAM(s) Oral at bedtime  chlorhexidine 2% Cloths 1 Application(s) Topical <User Schedule>  clopidogrel Tablet 75 milliGRAM(s) Oral daily  dextrose 5%. 1000 milliLiter(s) (100 mL/Hr) IV Continuous <Continuous>  dextrose 5%. 1000 milliLiter(s) (50 mL/Hr) IV Continuous <Continuous>  dextrose 50% Injectable 25 Gram(s) IV Push once  dextrose 50% Injectable 12.5 Gram(s) IV Push once  dextrose 50% Injectable 25 Gram(s) IV Push once  ezetimibe 10 milliGRAM(s) Oral daily  fenofibrate Tablet 145 milliGRAM(s) Oral daily  glucagon  Injectable 1 milliGRAM(s) IntraMuscular once  guaiFENesin  milliGRAM(s) Oral every 12 hours  insulin glargine Injectable (LANTUS) 34 Unit(s) SubCutaneous every morning  insulin lispro (ADMELOG) corrective regimen sliding scale   SubCutaneous three times a day before meals  metoprolol succinate ER 50 milliGRAM(s) Oral daily  pantoprazole    Tablet 40 milliGRAM(s) Oral before breakfast  torsemide 20 milliGRAM(s) Oral every 12 hours    MEDICATIONS  (PRN):  dextrose Oral Gel 15 Gram(s) Oral once PRN Blood Glucose LESS THAN 70 milliGRAM(s)/deciliter  melatonin 5 milliGRAM(s) Oral at bedtime PRN Insomnia      HOME MEDICATIONS:  Home Medications:  atorvastatin 40 mg oral tablet: 1 tab(s) orally once a day (25 May 2025 03:34)  clopidogrel 75 mg oral tablet: 1 tab(s) orally once a day (25 May 2025 03:34)  eszopiclone 3 mg oral tablet: 1 tab(s) orally once a day (at bedtime) (25 May 2025 03:37)  ezetimibe 10 mg oral tablet: 1 orally once a day (25 May 2025 03:37)  famotidine 40 mg oral tablet: 1 tab(s) orally once a day (25 May 2025 03:37)  fenofibrate 145 mg oral tablet: 1 orally once a day (25 May 2025 03:37)  melatonin 5 mg oral tablet: 1 tab(s) orally once a day (at bedtime) (25 May 2025 03:37)  metFORMIN 500 mg oral tablet: 1 tab(s) orally 2 times a day (25 May 2025 03:37)  metoprolol succinate 50 mg oral tablet, extended release: 1 tab(s) orally once a day (25 May 2025 03:37)  Soaanz 20 mg oral tablet: 1 tab(s) orally every 12 hours (25 May 2025 04:26)  spironolactone 25 mg oral tablet: 1 tab(s) orally every 24 hours (25 May 2025 04:25)  Tresiba 100 units/mL subcutaneous solution: 34 unit(s) subcutaneous once a day (in the morning) (25 May 2025 04:25)      VITALS:   T(F): 97.6 (05-25 @ 05:03), Max: 97.6 (05-24 @ 23:26)  HR: 100 (05-25 @ 05:03) (86 - 100)  BP: 151/86 (05-25 @ 05:03) (145/69 - 176/97)  BP(mean): --  RR: 18 (05-25 @ 05:03) (17 - 18)  SpO2: 95% (05-25 @ 05:03) (95% - 100%)    I&O's Summary    24 May 2025 07:01  -  25 May 2025 07:00  --------------------------------------------------------  IN: 0 mL / OUT: 550 mL / NET: -550 mL        REVIEW OF SYSTEMS:  See HPI      PHYSICAL EXAM:  NEURO: patient is awake , alert and oriented  GEN: Not in acute distress  NECK: no thyroid enlargement, no JVD  LUNGS: Clear to auscultation bilaterally   CARDIOVASCULAR: S1/S2 present, Irregular Rate and Rhythm, no murmurs or rubs, no carotid bruits,  + PP bilaterally  ABD: Soft, non-tender, non-distended, +BS  EXT: No JUAN  SKIN: Intact      LABS:                        12.6   14.60 )-----------( 343      ( 25 May 2025 04:30 )             40.5     05-25    134[L]  |  101  |  21[H]  ----------------------------<  253[H]  5.1[H]   |  19  |  1.3    Ca    9.4      25 May 2025 00:27  Mg     1.9     05-25    TPro  6.8  /  Alb  3.7  /  TBili  0.2  /  DBili  x   /  AST  25  /  ALT  12  /  AlkPhos  98  05-25    PT/INR - ( 25 May 2025 00:27 )   PT: 11.60 sec;   INR: 0.98 ratio         PTT - ( 25 May 2025 00:27 )  PTT:27.8 sec        04-19 Chol 209[H] LDL -- HDL 34[L] Trig 116      RADIOLOGY:  -CXR:  < from: Xray Chest 1 View- PORTABLE-Urgent (05.12.25 @ 23:29) >  IMPRESSION:    Increased bilateral opacities/effusions.    --- End of Report ---    < end of copied text >              < from: TTE Echo Complete w/o Contrast w/ Doppler (04.20.25 @ 13:14) >      Summary:   1. LV Ejection Fraction by Beck's Method with a biplane EF of 30 %.   2. Severely decreased global left ventricular systolic function.   3. Endocardial visualization was enhanced with intravenous echo contrast.   4. Left atrial enlargement.   5. Mildly reduced RV systolic function.   6. Mild mitral valve regurgitation.   7. Thickening of the anterior and posterior mitral valve leaflets.   8. Moderate tricuspid regurgitation.   9. Bioprosthetic aortic valve in place. Peak/mean [id number]/15.3   mmHg.  10. Estimated pulmonary artery systolic pressure is 59.9 mmHg assuming a   right atrial pressure of 3 mmHg, which is consistent with moderate   pulmonary hypertension.  11. Likely PFO noted by color doppler wtih left to right shunt.    < end of copied text >              < from: Cardiac Cath Lab - Adult (06.15.21 @ 13:49) >    SUMMARY:         1ST LESION INTERVENTIONS: A successful drug-eluting stent with balloon    angioplasty and myocardial flow reserve measurement was performed on the    70 % lesion in the mid LAD. Following intervention there was an excellent    angiographic appearance with a 0 % residual stenosis.    < end of copied text >      ECG:  < from: 12 Lead ECG (05.13.25 @ 18:46) >   AV dual-paced rhythm  Biventricular pacemaker detected  Abnormal ECG    Confirmed by Suleman Bhatt ([id number]) on 5/14/2025 7:42:59 AM    < end of copied text >

## 2025-05-25 NOTE — DISCHARGE NOTE PROVIDER - ATTENDING DISCHARGE PHYSICAL EXAMINATION:
Patient seen and examined 5/25/2025.    Vital Signs Last 24 Hrs  T(C): 36.8 (25 May 2025 14:15), Max: 36.8 (25 May 2025 14:15)  T(F): 98.2 (25 May 2025 14:15), Max: 98.2 (25 May 2025 14:15)  HR: 69 (25 May 2025 14:15) (69 - 100)  BP: 146/73 (25 May 2025 14:15) (145/69 - 176/97)  BP(mean): --  RR: 16 (25 May 2025 14:15) (16 - 18)  SpO2: 95% (25 May 2025 14:15) (95% - 100%)    Parameters below as of 25 May 2025 01:44  Patient On (Oxygen Delivery Method): nasal cannula  O2 Flow (L/min): 3    PHYSICAL EXAM:  GENERAL: NAD, sitting up in bed  PSYCH: no agitation, baseline mentation  NERVOUS SYSTEM:  Alert, freely moving all extremities  PULMONARY: Decreased breath sounds bibasilar, otherwise clear to ausculation bilaterally  CARDIOVASCULAR: Regular rate  GI: Soft, Nontender  EXTREMITIES:  No cyanosis or edema  SKIN: No obvious rashes or lesions

## 2025-05-25 NOTE — PATIENT PROFILE ADULT - FALL HARM RISK - HARM RISK INTERVENTIONS

## 2025-05-25 NOTE — H&P ADULT - ASSESSMENT
Possible AICD discharge     History of CHF, based on last ECHO done 4/20/25 with EF of 30%, would classify as HFrEF currently not decompensated     DM on insulin    Medication management  Possible AICD discharge     History of CHF, based on last ECHO done 4/20/25 with EF of 30%, would classify as HFrEF currently not decompensated     DM on insulin    Medication management     Old records reviewed Possible AICD discharge     History of CHF, based on last ECHO done 4/20/25 with EF of 30%, would classify as HFrEF currently not decompensated - due to current mild hyperkalemia will start Aldactone and Entresto tomorrow, continue Torsemide     DM on insulin (holding oral meds including metformin and dapagliflozin for now)     History of A Fib (now regular rate, would classify as PAF) - DOAC, amiodarone, metoprolol     COPD - clarify if still on treatment when able     CAD - continue listed meds     Medication management     Old records reviewed Possible AICD discharge     History of CHF, based on last ECHO done 4/20/25 with EF of 30%, would classify as HFrEF currently not decompensated - due to current mild hyperkalemia will start Aldactone and Entresto tomorrow, continue Torsemide     DM on insulin (holding oral meds including metformin and dapagliflozin for now)     History of A Fib (now regular rate, would classify as PAF) - DOAC, amiodarone, metoprolol     COPD - says he isn't using inhalers (prescribed but not using?) but is using Mucinex    CAD - continue listed meds     Medication management - although patient confirmed most medications on list, he adds that all of his meds are in a basin and his son helps to administer them to him     Old records reviewed

## 2025-05-25 NOTE — CHART NOTE - NSCHARTNOTEFT_GEN_A_CORE
69yo male with extensive PMH to include HTN, CAD, COPD, CHF, A FIB, DM, DYSLIPIDEMIA, GERD and CVA, presents to the ER because after an argument with his wife, he felt a sharp pain to his chest which knocked him to the ground.    Vital Signs Last 24 Hrs  T(C): 36.4 (25 May 2025 05:03), Max: 36.4 (24 May 2025 23:26)  T(F): 97.6 (25 May 2025 05:03), Max: 97.6 (24 May 2025 23:26)  HR: 100 (25 May 2025 05:03) (86 - 100)  BP: 151/86 (25 May 2025 05:03) (145/69 - 176/97)  BP(mean): --  RR: 18 (25 May 2025 05:03) (17 - 18)  SpO2: 95% (25 May 2025 05:03) (95% - 100%)    Parameters below as of 25 May 2025 01:44  Patient On (Oxygen Delivery Method): nasal cannula  O2 Flow (L/min): 3    PHYSICAL EXAM:  GENERAL: NAD, sitting up in bed  PSYCH: no agitation, baseline mentation  NERVOUS SYSTEM:  Alert, freely moving all extremities  PULMONARY: Decreased breath sounds bibasilar  CARDIOVASCULAR: Regular rate  GI: Soft, Nontender  EXTREMITIES:  No cyanosis or edema  SKIN: No obvious rashes or lesions    #Sharp pain in chest  Cardiology consulted  AICD interrogated, no events noted    #Chronic HFrEF without exacerbation, mild overload with bilateral pleural effusions improved from last chest xray  Hold torsemide  Lasix 40mg IV daily  Continue other heart failure medications    #DM - SSI    #pAfib - DOAC, amiodarone, metoprolol     #COPD on 5L of O2 at home - Spiriva    #CAD - continue home medications

## 2025-05-25 NOTE — DISCHARGE NOTE PROVIDER - PROVIDER TOKENS
PROVIDER:[TOKEN:[[id number]],FOLLOWUP:[1 week]],FREE:[LAST:[cardiology],PHONE:[(   )    -],FAX:[(   )    -],ADDRESS:[follow up with your cardiologist in 1x week],FOLLOWUP:[1 week]] PROVIDER:[TOKEN:[[id number]],FOLLOWUP:[1 week]],FREE:[LAST:[cardiology],PHONE:[(   )    -],FAX:[(   )    -],ADDRESS:[follow up with your cardiologist in 1x week],FOLLOWUP:[1 week]],PROVIDER:[TOKEN:[[id number]],FOLLOWUP:[1 week]]

## 2025-05-26 ENCOUNTER — EMERGENCY (EMERGENCY)
Facility: HOSPITAL | Age: 70
LOS: 0 days | Discharge: HOME | End: 2025-05-26
Attending: EMERGENCY MEDICINE
Payer: MEDICARE

## 2025-05-26 VITALS
RESPIRATION RATE: 16 BRPM | HEART RATE: 98 BPM | SYSTOLIC BLOOD PRESSURE: 119 MMHG | DIASTOLIC BLOOD PRESSURE: 82 MMHG | TEMPERATURE: 98 F | OXYGEN SATURATION: 97 %

## 2025-05-26 VITALS
WEIGHT: 169.98 LBS | DIASTOLIC BLOOD PRESSURE: 72 MMHG | SYSTOLIC BLOOD PRESSURE: 125 MMHG | OXYGEN SATURATION: 96 % | RESPIRATION RATE: 18 BRPM | HEIGHT: 63 IN | TEMPERATURE: 98 F | HEART RATE: 84 BPM

## 2025-05-26 DIAGNOSIS — Z95.2 PRESENCE OF PROSTHETIC HEART VALVE: Chronic | ICD-10-CM

## 2025-05-26 DIAGNOSIS — Z01.89 ENCOUNTER FOR OTHER SPECIFIED SPECIAL EXAMINATIONS: ICD-10-CM

## 2025-05-26 DIAGNOSIS — Z98.890 OTHER SPECIFIED POSTPROCEDURAL STATES: Chronic | ICD-10-CM

## 2025-05-26 DIAGNOSIS — Z95.5 PRESENCE OF CORONARY ANGIOPLASTY IMPLANT AND GRAFT: Chronic | ICD-10-CM

## 2025-05-26 DIAGNOSIS — Z88.2 ALLERGY STATUS TO SULFONAMIDES: ICD-10-CM

## 2025-05-26 LAB — GLUCOSE BLDC GLUCOMTR-MCNC: 344 MG/DL — HIGH (ref 70–99)

## 2025-05-26 PROCEDURE — 99283 EMERGENCY DEPT VISIT LOW MDM: CPT

## 2025-05-26 PROCEDURE — 82962 GLUCOSE BLOOD TEST: CPT

## 2025-05-26 NOTE — ED PROVIDER NOTE - OBJECTIVE STATEMENT
70-year-old male presents to the ED for evaluation.  Patient states that he had a visit from the visiting nurse today.  Patient states that he was on the floor sitting there.  Patient states I did not fall I put myself there.  Visiting nurse came in and insisted that he come to the hospital.  Patient's wife was also home and states that patient did not fall as she was with him.  Visiting nurse called 911 and insisted he come to the hospital for further evaluation.  Patient denies any complaints and does not want any intervention here in the hospital

## 2025-05-26 NOTE — ED ADULT TRIAGE NOTE - BMI (KG/M2)
"[facility]    Medicine Progress Note - Hospitalist Service    Date of Admission:  5/24/2024    Assessment & Plan      Chase Mcdaniels is a 56 year old male admitted on 5/24/2024 with recurrent diverticulitis with microperforation and abscess    Complicated diverticulitis with microperforation and abscess  --- Second episode.  Previous episode 5/2023 treated as outpatient and had microperforation at that time  --- Acute onset symptoms one day PTA  --- CT with microperforation as well as 2 cm intermural abscess in sigmoid colon too small to drain  --- Admit, continue n.p.o. status  --- Continue Zosyn  --- Surgery consult, second episode: NPO for now. Defer to surgeon to advance diet  --- Fluids and IV pain control  --- Blood culture and lactic acid requested.  --- Trend WBC    Acid reflux  -iv ppi          Diet: NPO for Medical/Clinical Reasons Except for: No Exceptions    DVT Prophylaxis: Enoxaparin (Lovenox) SQ  Frazier Catheter: Not present  Lines: None     Cardiac Monitoring: None  Code Status: Full Code      Clinically Significant Risk Factors Present on Admission                       # Overweight: Estimated body mass index is 29.45 kg/m  as calculated from the following:    Height as of this encounter: 1.778 m (5' 10\").    Weight as of this encounter: 93.1 kg (205 lb 4 oz).              Disposition Plan     Medically Ready for Discharge: Anticipated in 2-4 Days    Barrier: npo, surgeon plan       Renea Trinidad MD  Hospitalist Service  [facility]  Securely message with [employer] (more info)  Text page via [employer] Paging/Directory   ______________________________________________________________________    Interval History   Abd pain with movement, cough, no n/v, no f/c; c/o heart burn    Physical Exam   Vital Signs: Temp: 97.8  F (36.6  C) Temp src: Oral BP: 119/58 Pulse: 80   Resp: 19 SpO2: 94 % O2 Device: None (Room air)    Weight: 205 lbs 3.97 oz    General.  Awake " alert oriented not in acute distress.  HEENT.  Pupils equal round react to light, anicteric, EOM intact.  Neck supple no JVD.  CVS regular rhythm no murmur gallops.  Lungs.  Clear to auscultation bilateral no wheezing or rales.  Abdomen.  Distended, diffuse mild tenderness, bowel sounds present.  Extremities.  No edema no calf tenderness.  Neurological.  Awake and alert. No focal deficit.  Skin no rash. No pallor.  Psych. Normal mood.      Medical Decision Making       46 MINUTES SPENT BY ME on the date of service doing chart review, history, exam, documentation & further activities per the note.      Data     I have personally reviewed the following data over the past 24 hrs:    10.2  \   12.8 (L)   / 145 (L)     N/A N/A N/A /  N/A   N/A N/A N/A \     Procal: N/A CRP: N/A Lactic Acid: 0.9         Imaging results reviewed over the past 24 hrs:   No results found for this or any previous visit (from the past 24 hour(s)).     30.1

## 2025-05-26 NOTE — ED PROVIDER NOTE - PROGRESS NOTE DETAILS
Patient's wife is at bedside and wants the patient to go home as well as she states he did not fall today at home.  Patient's wife said they told us we could not rma from the ambulance.

## 2025-05-26 NOTE — ED ADULT NURSE NOTE - CHIEF COMPLAINT QUOTE
Patient BIBA from home.  Per EMS "Visiting nurse found the patient on the floor.  The nurse called 911 because she said he fell.  Patient stated he likes to sleep on the floor.".  Patient stated he is here for high sugar of over 400.

## 2025-05-26 NOTE — ED PROVIDER NOTE - PATIENT PORTAL LINK FT
You can access the FollowMyHealth Patient Portal offered by [facility] by registering at the following website: [email or web address]. By joining [employer]’s FollowMyHealth portal, you will also be able to view your health information using other applications (apps) compatible with our system.

## 2025-05-26 NOTE — ED PROVIDER NOTE - PATIENT'S SEXUAL ORIENTATION
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 
Circ-1: Nick Lucas RN 
Circ-Relief: Salma Villegas RN Scrub Tech-1: [facility] Scrub Tech-Relief: Elinore Dubonnet Felty Surg Asst-1: Lindsay Johansen Surg Asst-Relief: Margie Wallis Event Time In Incision Start [phone] Incision Close [address] Anesthesia: General / LMA Estimated Blood Loss: 30ml FLuids: 250ml Pre surgery antibiotic: vancomycin 1000mg IV Specimens: none Findings: removal of lateral L forearm AVG pseudoaneurysm section, after repair palpable thrill thru out AVG and palp L radial artery Complications: none Implants:  
Implant Name Type Inv. Item Serial No.  Lot No. LRB No. Used Action GRAFT VASC [id number] Derril [place]   GRAFT VASC [id number] TAPR -- ACUSEAL [id number]  [employer]   Left 1 Implanted Heterosexual

## 2025-05-26 NOTE — ED ADULT NURSE NOTE - NSFALLRISKINTERV_ED_ALL_ED

## 2025-05-26 NOTE — ED PROVIDER NOTE - ATTENDING APP SHARED VISIT CONTRIBUTION OF CARE
Pt comes in by EMS but denies any acute medical complaints.  States he didn't want to come to hopsital but EMS insisted.    Exam: NAD  Plan: dc home

## 2025-05-26 NOTE — ED ADULT NURSE NOTE - CINV DISCH TEACH PARTICIP
"Intake from [facility] called. They can \"probably accept 6/23/23 pending vj fabian. Also patient would have a $25/day room fee.\" She will call  here at the hospital in the AM of 6/23/23 to confirm they can take the patient. I spoke to Aleshia. Aleshia also made aware of transfer to [facility].    I also called and let Humzahernandez know about this case so she can be watching for her to come and hopefully continue this smooth transition to TCU.    I updated patient and family about this plan for transfer to [facility] and about all the TCU plans already in place. They are \"very happy to potentially go to [facility] tomorrow.\"   " Patient/Spouse

## 2025-05-30 ENCOUNTER — EMERGENCY (EMERGENCY)
Facility: HOSPITAL | Age: 70
LOS: 0 days | Discharge: LEFT BEFORE DISCHARGE | End: 2025-05-30
Attending: EMERGENCY MEDICINE
Payer: MEDICARE

## 2025-05-30 VITALS
OXYGEN SATURATION: 92 % | RESPIRATION RATE: 18 BRPM | SYSTOLIC BLOOD PRESSURE: 168 MMHG | TEMPERATURE: 98 F | DIASTOLIC BLOOD PRESSURE: 106 MMHG | WEIGHT: 166.89 LBS | HEART RATE: 112 BPM | HEIGHT: 63 IN
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
## 2025-05-30 DIAGNOSIS — E78.5 HYPERLIPIDEMIA, UNSPECIFIED: ICD-10-CM
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
## 2025-05-30 DIAGNOSIS — Z04.3 ENCOUNTER FOR EXAMINATION AND OBSERVATION FOLLOWING OTHER ACCIDENT: ICD-10-CM

## 2025-05-30 DIAGNOSIS — J44.9 CHRONIC OBSTRUCTIVE PULMONARY DISEASE, UNSPECIFIED: ICD-10-CM

## 2025-05-30 DIAGNOSIS — R00.0 TACHYCARDIA, UNSPECIFIED: ICD-10-CM

## 2025-05-30 DIAGNOSIS — Z87.891 PERSONAL HISTORY OF NICOTINE DEPENDENCE: ICD-10-CM

## 2025-05-30 DIAGNOSIS — Z95.2 PRESENCE OF PROSTHETIC HEART VALVE: Chronic | ICD-10-CM

## 2025-05-30 DIAGNOSIS — Z95.0 PRESENCE OF CARDIAC PACEMAKER: ICD-10-CM

## 2025-05-30 DIAGNOSIS — I50.9 HEART FAILURE, UNSPECIFIED: ICD-10-CM

## 2025-05-30 DIAGNOSIS — E11.9 TYPE 2 DIABETES MELLITUS WITHOUT COMPLICATIONS: ICD-10-CM

## 2025-05-30 DIAGNOSIS — Z79.01 LONG TERM (CURRENT) USE OF ANTICOAGULANTS: ICD-10-CM

## 2025-05-30 DIAGNOSIS — Z86.73 PERSONAL HISTORY OF TRANSIENT ISCHEMIC ATTACK (TIA), AND CEREBRAL INFARCTION WITHOUT RESIDUAL DEFICITS: ICD-10-CM

## 2025-05-30 DIAGNOSIS — Z95.5 PRESENCE OF CORONARY ANGIOPLASTY IMPLANT AND GRAFT: Chronic | ICD-10-CM

## 2025-05-30 DIAGNOSIS — Z53.29 PROCEDURE AND TREATMENT NOT CARRIED OUT BECAUSE OF PATIENT'S DECISION FOR OTHER REASONS: ICD-10-CM

## 2025-05-30 DIAGNOSIS — Z88.2 ALLERGY STATUS TO SULFONAMIDES: ICD-10-CM

## 2025-05-30 DIAGNOSIS — I25.10 ATHEROSCLEROTIC HEART DISEASE OF NATIVE CORONARY ARTERY WITHOUT ANGINA PECTORIS: ICD-10-CM

## 2025-05-30 DIAGNOSIS — I48.91 UNSPECIFIED ATRIAL FIBRILLATION: ICD-10-CM

## 2025-05-30 DIAGNOSIS — I11.0 HYPERTENSIVE HEART DISEASE WITH HEART FAILURE: ICD-10-CM

## 2025-05-30 PROCEDURE — 72170 X-RAY EXAM OF PELVIS: CPT

## 2025-05-30 PROCEDURE — 93005 ELECTROCARDIOGRAM TRACING: CPT

## 2025-05-30 PROCEDURE — 70450 CT HEAD/BRAIN W/O DYE: CPT | Mod: 26

## 2025-05-30 PROCEDURE — 93010 ELECTROCARDIOGRAM REPORT: CPT

## 2025-05-30 PROCEDURE — 71045 X-RAY EXAM CHEST 1 VIEW: CPT | Mod: 26

## 2025-05-30 PROCEDURE — 72170 X-RAY EXAM OF PELVIS: CPT | Mod: 26

## 2025-05-30 PROCEDURE — 70450 CT HEAD/BRAIN W/O DYE: CPT

## 2025-05-30 PROCEDURE — 99285 EMERGENCY DEPT VISIT HI MDM: CPT

## 2025-05-30 PROCEDURE — 71045 X-RAY EXAM CHEST 1 VIEW: CPT

## 2025-05-30 PROCEDURE — 99284 EMERGENCY DEPT VISIT MOD MDM: CPT | Mod: 25

## 2025-05-30 NOTE — ED PROVIDER NOTE - WR ORDER STATUS 2
"Ramiro is a 56 year old who is being evaluated via a billable video visit.      How would you like to obtain your AVS? MyChart  If the video visit is dropped, the invitation should be resent by: Text to cell phone: [phone]  Will anyone else be joining your video visit? No          Assessment & Plan     Acute cough  COVID test was done but was very old and  test.  Will have him redo that along with influenza swab today.  Would be able to treat those if present.  Continue ibuprofen for fever control.  Continue fluid intake.  - Symptomatic Influenza A/B, RSV, & SARS-CoV2 PCR (COVID-19) Nose      Kyler Hill MD  [facility]    Subjective   Ramiro is a 56 year old, presenting for the following health issues:  Fever and Headache        2023     3:37 PM   Additional Questions   Roomed by hser   Accompanied by self       History of Present Illness       Headaches:   Since the patient's last clinic visit, headaches are: no change  The patient is getting headaches:  Headaches accompany fever only.  No migraines  He is able to do normal daily activities when he has a migraine.  The patient is taking the following rescue/relief medications:  Ibuprofen (Advil, Motrin)   Patient states \"I get some relief\" from the rescue/relief medications.   The patient is taking the following medications to prevent migraines:  No medications to prevent migraines  In the past 4 weeks, the patient has gone to an Urgent Care or Emergency Room 0 times times due to headaches.    Reason for visit:  Flu symptoms.  See notes.  Symptom onset:  1-3 days ago  Symptoms include:  See notes - fever, headache, chills, shallow cough  Symptom intensity:  Moderate  Symptom progression:  Staying the same  Had these symptoms before:  No  What makes it better:  IbuprofinUrbano consumes 0 sweetened beverage(s) daily.He exercises with enough effort to increase his heart rate 20 to 29 minutes per day.  He exercises with enough " effort to increase his heart rate 3 or less days per week.   He is taking medications regularly.     Acute Illness  Acute illness concerns: as above  Onset/Duration: ~5 days  Symptoms:  Fever: YES  Chills/Sweats: YES  Headache (location?): YES  Sinus Pressure: YES  Conjunctivitis:  No  Ear Pain: no  Rhinorrhea: YES  Cough: YES-non-productive  Wheeze: No  Nausea: No  Vomiting: No  Diarrhea: No  Fatigue/Achiness: YES  Sick/Strep Exposure: YES flu at work  Therapies tried and outcome: ibuprofen for HA and fever        Review of Systems   Constitutional:  Positive for fever.   Neurological:  Positive for headaches.          Objective    Vitals - Patient Reported  Systolic (Patient Reported):  (unable to)  Diastolic (Patient Reported):  (unable to)  Weight (Patient Reported):  (unable to)  Height (Patient Reported):  (unable to)  SpO2 (Patient Reported):  (unable to)  Temperature (Patient Reported): 100.2  F (37.9  C)  Pulse (Patient Reported):  (unable to)        Physical Exam   GENERAL: Healthy, alert and no distress  EYES: Eyes grossly normal to inspection.  No discharge or erythema, or obvious scleral/conjunctival abnormalities.  RESP: Mild cough. No visible retractions or increased work of breathing.    SKIN: Visible skin clear. No significant rash, abnormal pigmentation or lesions.  NEURO: Cranial nerves grossly intact.  Mentation and speech appropriate for age.  PSYCH: Mentation appears normal, affect normal/bright, judgement and insight intact, normal speech and appearance well-groomed.          Video-Visit Details    Type of service:  Video Visit     Originating Location (pt. Location): Home    Distant Location (provider location):  On-site  Platform used for Video Visit: [employer]    Prior to immunization administration, verified patients identity using patient s name and date of birth. Please see Immunization Activity for additional information.     Screening Questionnaire for Adult Immunization    Are you sick  today?   Yes   Do you have allergies to medications, food, a vaccine component or latex?   No   Have you ever had a serious reaction after receiving a vaccination?   No   Do you have a long-term health problem with heart, lung, kidney, or metabolic disease (e.g., diabetes), asthma, a blood disorder, no spleen, complement component deficiency, a cochlear implant, or a spinal fluid leak?  Are you on long-term aspirin therapy?   No   Do you have cancer, leukemia, HIV/AIDS, or any other immune system problem?   No   Do you have a parent, brother, or sister with an immune system problem?   No   In the past 3 months, have you taken medications that affect  your immune system, such as prednisone, other steroids, or anticancer drugs; drugs for the treatment of rheumatoid arthritis, Crohn s disease, or psoriasis; or have you had radiation treatments?   No   Have you had a seizure, or a brain or other nervous system problem?   No   During the past year, have you received a transfusion of blood or blood    products, or been given immune (gamma) globulin or antiviral drug?   No   For women: Are you pregnant or is there a chance you could become       pregnant during the next month?   No   Have you received any vaccinations in the past 4 weeks?   No     Immunization questionnaire was positive for at least one answer.  Notified provider.      Patient instructed to remain in clinic for 15 minutes afterwards, and to report any adverse reactions.     Screening performed by Michael Johnson MA on 12/4/2023 at 3:38 PM.        Performed Resulted

## 2025-05-30 NOTE — ED PROVIDER NOTE - DIFFERENTIAL DIAGNOSIS
Differential Diagnosis The differential diagnosis for patients clinical presentation includes but is not limited to:  Closed head injury, skull fracture, SDH, SAH, ICH, cervical spine injury, rib fracture, intra-abdominal injury, fracture, contusion, sprain, strain

## 2025-05-30 NOTE — ED PROVIDER NOTE - NSFOLLOWUPINSTRUCTIONS_ED_ALL_ED_FT
IUD Insertion    No LMP recorded.    Date of procedure:  1/12/2023    Risks and benefits discussed? yes  All questions answered? yes  Consents given by The patient  Written consent obtained? yes    Procedure documentation:     Urine pregnancy test was done and was NEGATIVE .  The risks (including infection,  bleeding, pain, and uterine perforation) and benefits of the procedure were  explained to the patient and Written informed consent was  obtained.    After verifying the patient had a low probability of being pregnant and met the criteria for insertion, a sterile speculum has placed and the cervix was cleansed with an antiseptic solution.  Vaginal discharge was scant.  The anterior lip of the cervix was grasped with an allis and the uterine cavity was gently sounded. There was mild difficulty passing the sound through the cervix.  Cervical dilation did not need to be performed prior to placing the IUD.  The uterus was anteverted and sounded to 8 cms.  The Kyleena was then prepared per the manufacturers instructions.    The Kyleena was advanced to a point 2 cms from the fundus and then the arms were released from the sheath.  The device was advanced to the fundus and the device was released fully from the sheath.. The string was cut 2 cms in length.  Bleeding from the cervix was scant.    She tolerated the procedure without any difficulty.    Post procedure instructions: The patient was advised to call for any fever or for prolonged or severe pain or bleeding. Pelvic rest advised for 2 wks    Follow up needed: 6 weeks for IUD check      U/s done and IUD in place   follow up with your PMD, Your can return at any time to finish your work up if you choose

## 2025-05-30 NOTE — ED PROVIDER NOTE - PATIENT PORTAL LINK FT
You can access the FollowMyHealth Patient Portal offered by [facility] by registering at the following website: [email or web address]. By joining [employer]’s FollowMyHealth portal, you will also be able to view your health information using other applications (apps) compatible with our system. You can access the FollowMyHealth Patient Portal offered by [facility] by registering at the following website: [email or web address]. By joining [employer]’s FollowMyHealth portal, you will also be able to view your health information using other applications (apps) compatible with our system.
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
## 2025-05-30 NOTE — ED ADULT TRIAGE NOTE - CHIEF COMPLAINT QUOTE
Pt fell at home, hit his head and is on Eliquis.  First he stated he didn't remember falling but then he states he did remember and he "lies alot"

## 2025-05-30 NOTE — ED PROVIDER NOTE - CONSIDERATION OF ADMISSION OBSERVATION
I considered admission for this patient. They are leaving the hospital AMA Consideration of Admission/Observation

## 2025-05-30 NOTE — ED PROVIDER NOTE - ATTENDING APP SHARED VISIT CONTRIBUTION OF CARE
I personally evaluated patient. I agree with the findings and plan with all documentation on chart except as documented  in my note.    70-year-old male past medical history of A-fib on Eliquis, CAD, CHF, COPD, prior CVA, diabetes, hypertension, dyslipidemia presents to the emergency department for evaluation after falling in his home.  Additional history obtained from patient's wife who reports she heard a thud and patient hit his head.  Patient aide called ambulance to bring him to the ED for evaluation.  Patient reports he is fine and denies any symptoms.  Patient denies any preceding symptoms and says that his aide is overdramatic and he does not want to be in the hospital.    On exam, vital signs reviewed.  GCS 15.  Primary survey is intact.  Patient has no signs of serious external trauma.  Initial plan was for x-ray, CAT scan, blood work.  Patient refusing IV placement and blood work at this time.  Patient convinced to have CT scan of head given Eliquis use and patient had chest x-ray and pelvis x-rays performed which did not show any serious trauma.  Patient refusing other care in the emergency department.  Patient's wife spoken to and plan made for discharge AGAINST MEDICAL ADVICE with appropriate home care and follow-up.  Patient aware he may return at any time to continue workup and treatment.  Patient aware of limitations of workup with current restrictions.  All questions and concerns addressed.

## 2025-05-30 NOTE — ED PROVIDER NOTE - OBJECTIVE STATEMENT
Alert-The patient is alert, awake and responds to voice. The patient is oriented to time, place, and person. The triage nurse is able to obtain subjective information.
Patient BIBA from home, found on floor at home, denies inj, not sure how he fell, possible syncope, denies chest pain, back pain , refusing any workup except xrays and  CT head, only, wants to go home , refusing admission and proper work up

## 2025-06-01 DIAGNOSIS — I11.0 HYPERTENSIVE HEART DISEASE WITH HEART FAILURE: ICD-10-CM

## 2025-06-01 DIAGNOSIS — E11.9 TYPE 2 DIABETES MELLITUS WITHOUT COMPLICATIONS: ICD-10-CM

## 2025-06-01 DIAGNOSIS — J44.9 CHRONIC OBSTRUCTIVE PULMONARY DISEASE, UNSPECIFIED: ICD-10-CM

## 2025-06-01 DIAGNOSIS — Z66 DO NOT RESUSCITATE: ICD-10-CM

## 2025-06-01 DIAGNOSIS — E87.5 HYPERKALEMIA: ICD-10-CM

## 2025-06-01 DIAGNOSIS — I25.10 ATHEROSCLEROTIC HEART DISEASE OF NATIVE CORONARY ARTERY WITHOUT ANGINA PECTORIS: ICD-10-CM

## 2025-06-01 DIAGNOSIS — R07.9 CHEST PAIN, UNSPECIFIED: ICD-10-CM

## 2025-06-01 DIAGNOSIS — Z88.2 ALLERGY STATUS TO SULFONAMIDES: ICD-10-CM

## 2025-06-01 DIAGNOSIS — Z95.0 PRESENCE OF CARDIAC PACEMAKER: ICD-10-CM

## 2025-06-01 DIAGNOSIS — Z79.84 LONG TERM (CURRENT) USE OF ORAL HYPOGLYCEMIC DRUGS: ICD-10-CM

## 2025-06-01 DIAGNOSIS — J90 PLEURAL EFFUSION, NOT ELSEWHERE CLASSIFIED: ICD-10-CM

## 2025-06-01 DIAGNOSIS — I50.22 CHRONIC SYSTOLIC (CONGESTIVE) HEART FAILURE: ICD-10-CM

## 2025-06-01 DIAGNOSIS — I48.0 PAROXYSMAL ATRIAL FIBRILLATION: ICD-10-CM

## 2025-06-02 ENCOUNTER — INPATIENT (INPATIENT)
Facility: HOSPITAL | Age: 70
LOS: 3 days | Discharge: HOME | DRG: 291 | End: 2025-06-06
Attending: FAMILY MEDICINE | Admitting: STUDENT IN AN ORGANIZED HEALTH CARE EDUCATION/TRAINING PROGRAM
Payer: MEDICARE

## 2025-06-02 VITALS
RESPIRATION RATE: 22 BRPM | HEART RATE: 108 BPM | SYSTOLIC BLOOD PRESSURE: 140 MMHG | OXYGEN SATURATION: 83 % | TEMPERATURE: 97 F | DIASTOLIC BLOOD PRESSURE: 113 MMHG | HEIGHT: 63 IN

## 2025-06-02 DIAGNOSIS — Z95.5 PRESENCE OF CORONARY ANGIOPLASTY IMPLANT AND GRAFT: Chronic | ICD-10-CM

## 2025-06-02 DIAGNOSIS — Z98.890 OTHER SPECIFIED POSTPROCEDURAL STATES: Chronic | ICD-10-CM

## 2025-06-02 DIAGNOSIS — Z95.2 PRESENCE OF PROSTHETIC HEART VALVE: Chronic | ICD-10-CM

## 2025-06-02 DIAGNOSIS — I50.9 HEART FAILURE, UNSPECIFIED: ICD-10-CM

## 2025-06-02 LAB
ALBUMIN SERPL ELPH-MCNC: 4 G/DL — SIGNIFICANT CHANGE UP (ref 3.5–5.2)
ALP SERPL-CCNC: 108 U/L — SIGNIFICANT CHANGE UP (ref 30–115)
ALT FLD-CCNC: 8 U/L — SIGNIFICANT CHANGE UP (ref 0–41)
ANION GAP SERPL CALC-SCNC: 19 MMOL/L — HIGH (ref 7–14)
AST SERPL-CCNC: 17 U/L — SIGNIFICANT CHANGE UP (ref 0–41)
BASOPHILS # BLD AUTO: 0 K/UL — SIGNIFICANT CHANGE UP (ref 0–0.2)
BASOPHILS NFR BLD AUTO: 0 % — SIGNIFICANT CHANGE UP (ref 0–1)
BILIRUB SERPL-MCNC: 0.6 MG/DL — SIGNIFICANT CHANGE UP (ref 0.2–1.2)
BUN SERPL-MCNC: 20 MG/DL — SIGNIFICANT CHANGE UP (ref 10–20)
CALCIUM SERPL-MCNC: 9.5 MG/DL — SIGNIFICANT CHANGE UP (ref 8.4–10.5)
CHLORIDE SERPL-SCNC: 98 MMOL/L — SIGNIFICANT CHANGE UP (ref 98–110)
CO2 SERPL-SCNC: 21 MMOL/L — SIGNIFICANT CHANGE UP (ref 17–32)
CREAT SERPL-MCNC: 1.3 MG/DL — SIGNIFICANT CHANGE UP (ref 0.7–1.5)
EGFR: 59 ML/MIN/1.73M2 — LOW
EGFR: 59 ML/MIN/1.73M2 — LOW
EOSINOPHIL NFR BLD AUTO: 0 % — SIGNIFICANT CHANGE UP (ref 0–8)
GAS PNL BLDV: SIGNIFICANT CHANGE UP
GAS PNL BLDV: SIGNIFICANT CHANGE UP
GLUCOSE SERPL-MCNC: 60 MG/DL — LOW (ref 70–99)
HCT VFR BLD CALC: 43.8 % — SIGNIFICANT CHANGE UP (ref 42–52)
HGB BLD-MCNC: 13.2 G/DL — LOW (ref 14–18)
LACTATE SERPL-SCNC: 2.5 MMOL/L — HIGH (ref 0.7–2)
LYMPHOCYTES # BLD AUTO: 0.7 K/UL — LOW (ref 1.2–3.4)
LYMPHOCYTES # BLD AUTO: 3 % — LOW (ref 20.5–51.1)
MCHC RBC-ENTMCNC: 25.7 PG — LOW (ref 27–31)
MCHC RBC-ENTMCNC: 30.1 G/DL — LOW (ref 32–37)
MCV RBC AUTO: 85.2 FL — SIGNIFICANT CHANGE UP (ref 80–94)
MONOCYTES # BLD AUTO: 1.64 K/UL — HIGH (ref 0.1–0.6)
MONOCYTES NFR BLD AUTO: 7 % — SIGNIFICANT CHANGE UP (ref 1.7–9.3)
NEUTROPHILS # BLD AUTO: 21.02 K/UL — HIGH (ref 1.4–6.5)
NEUTROPHILS NFR BLD AUTO: 90 % — HIGH (ref 42.2–75.2)
NRBC BLD AUTO-RTO: SIGNIFICANT CHANGE UP /100 WBCS (ref 0–0)
NT-PROBNP SERPL-SCNC: 2963 PG/ML — HIGH (ref 0–300)
PLATELET # BLD AUTO: 484 K/UL — HIGH (ref 130–400)
PMV BLD: 10.2 FL — SIGNIFICANT CHANGE UP (ref 7.4–10.4)
POTASSIUM SERPL-MCNC: 4.3 MMOL/L — SIGNIFICANT CHANGE UP (ref 3.5–5)
POTASSIUM SERPL-SCNC: 4.3 MMOL/L — SIGNIFICANT CHANGE UP (ref 3.5–5)
PROT SERPL-MCNC: 7.4 G/DL — SIGNIFICANT CHANGE UP (ref 6–8)
RBC # BLD: 5.14 M/UL — SIGNIFICANT CHANGE UP (ref 4.7–6.1)
RBC # FLD: 15.2 % — HIGH (ref 11.5–14.5)
SODIUM SERPL-SCNC: 138 MMOL/L — SIGNIFICANT CHANGE UP (ref 135–146)
TROPONIN T, HIGH SENSITIVITY RESULT: 117 NG/L — CRITICAL HIGH (ref 6–21)
WBC # BLD: 23.36 K/UL — HIGH (ref 4.8–10.8)
WBC # FLD AUTO: 23.36 K/UL — HIGH (ref 4.8–10.8)

## 2025-06-02 PROCEDURE — 93010 ELECTROCARDIOGRAM REPORT: CPT

## 2025-06-02 PROCEDURE — 82570 ASSAY OF URINE CREATININE: CPT

## 2025-06-02 PROCEDURE — 99223 1ST HOSP IP/OBS HIGH 75: CPT

## 2025-06-02 PROCEDURE — 85025 COMPLETE CBC W/AUTO DIFF WBC: CPT

## 2025-06-02 PROCEDURE — 80048 BASIC METABOLIC PNL TOTAL CA: CPT

## 2025-06-02 PROCEDURE — 82962 GLUCOSE BLOOD TEST: CPT

## 2025-06-02 PROCEDURE — 36415 COLL VENOUS BLD VENIPUNCTURE: CPT

## 2025-06-02 PROCEDURE — 83735 ASSAY OF MAGNESIUM: CPT

## 2025-06-02 PROCEDURE — 82803 BLOOD GASES ANY COMBINATION: CPT

## 2025-06-02 PROCEDURE — 71250 CT THORAX DX C-: CPT

## 2025-06-02 PROCEDURE — 0225U NFCT DS DNA&RNA 21 SARSCOV2: CPT

## 2025-06-02 PROCEDURE — 71045 X-RAY EXAM CHEST 1 VIEW: CPT | Mod: 26

## 2025-06-02 PROCEDURE — 99291 CRITICAL CARE FIRST HOUR: CPT

## 2025-06-02 PROCEDURE — 84540 ASSAY OF URINE/UREA-N: CPT

## 2025-06-02 PROCEDURE — 85014 HEMATOCRIT: CPT

## 2025-06-02 PROCEDURE — 71045 X-RAY EXAM CHEST 1 VIEW: CPT

## 2025-06-02 PROCEDURE — 81001 URINALYSIS AUTO W/SCOPE: CPT

## 2025-06-02 PROCEDURE — 84300 ASSAY OF URINE SODIUM: CPT

## 2025-06-02 PROCEDURE — 83605 ASSAY OF LACTIC ACID: CPT

## 2025-06-02 PROCEDURE — 97162 PT EVAL MOD COMPLEX 30 MIN: CPT | Mod: GP

## 2025-06-02 PROCEDURE — 76775 US EXAM ABDO BACK WALL LIM: CPT

## 2025-06-02 PROCEDURE — 85018 HEMOGLOBIN: CPT

## 2025-06-02 PROCEDURE — 80053 COMPREHEN METABOLIC PANEL: CPT

## 2025-06-02 PROCEDURE — 84132 ASSAY OF SERUM POTASSIUM: CPT

## 2025-06-02 PROCEDURE — 94660 CPAP INITIATION&MGMT: CPT

## 2025-06-02 PROCEDURE — 0241U: CPT

## 2025-06-02 PROCEDURE — 84156 ASSAY OF PROTEIN URINE: CPT

## 2025-06-02 PROCEDURE — 84295 ASSAY OF SERUM SODIUM: CPT

## 2025-06-02 PROCEDURE — 84133 ASSAY OF URINE POTASSIUM: CPT

## 2025-06-02 PROCEDURE — 94640 AIRWAY INHALATION TREATMENT: CPT

## 2025-06-02 PROCEDURE — 85027 COMPLETE CBC AUTOMATED: CPT

## 2025-06-02 PROCEDURE — 82330 ASSAY OF CALCIUM: CPT

## 2025-06-02 PROCEDURE — 71250 CT THORAX DX C-: CPT | Mod: 26

## 2025-06-02 PROCEDURE — 83935 ASSAY OF URINE OSMOLALITY: CPT

## 2025-06-02 PROCEDURE — 70450 CT HEAD/BRAIN W/O DYE: CPT

## 2025-06-02 RX ORDER — DEXTROSE 50 % IN WATER 50 %
50 SYRINGE (ML) INTRAVENOUS ONCE
Refills: 0 | Status: COMPLETED | OUTPATIENT
Start: 2025-06-02 | End: 2025-06-02

## 2025-06-02 RX ORDER — DEXTROSE 50 % IN WATER 50 %
15 SYRINGE (ML) INTRAVENOUS ONCE
Refills: 0 | Status: DISCONTINUED | OUTPATIENT
Start: 2025-06-02 | End: 2025-06-06

## 2025-06-02 RX ORDER — CEFTRIAXONE 500 MG/1
1000 INJECTION, POWDER, FOR SOLUTION INTRAMUSCULAR; INTRAVENOUS ONCE
Refills: 0 | Status: COMPLETED | OUTPATIENT
Start: 2025-06-02 | End: 2025-06-02

## 2025-06-02 RX ORDER — FUROSEMIDE 10 MG/ML
40 INJECTION INTRAMUSCULAR; INTRAVENOUS DAILY
Refills: 0 | Status: DISCONTINUED | OUTPATIENT
Start: 2025-06-02 | End: 2025-06-03

## 2025-06-02 RX ORDER — SODIUM CHLORIDE 9 G/1000ML
1000 INJECTION, SOLUTION INTRAVENOUS
Refills: 0 | Status: DISCONTINUED | OUTPATIENT
Start: 2025-06-02 | End: 2025-06-06

## 2025-06-02 RX ORDER — AZITHROMYCIN 250 MG
500 CAPSULE ORAL ONCE
Refills: 0 | Status: COMPLETED | OUTPATIENT
Start: 2025-06-02 | End: 2025-06-02

## 2025-06-02 RX ORDER — DAPAGLIFLOZIN 5 MG/1
10 TABLET, FILM COATED ORAL DAILY
Refills: 0 | Status: DISCONTINUED | OUTPATIENT
Start: 2025-06-02 | End: 2025-06-04

## 2025-06-02 RX ORDER — AZITHROMYCIN 250 MG
500 CAPSULE ORAL EVERY 24 HOURS
Refills: 0 | Status: DISCONTINUED | OUTPATIENT
Start: 2025-06-02 | End: 2025-06-03

## 2025-06-02 RX ORDER — GLUCAGON 3 MG/1
1 POWDER NASAL ONCE
Refills: 0 | Status: DISCONTINUED | OUTPATIENT
Start: 2025-06-02 | End: 2025-06-06

## 2025-06-02 RX ORDER — METHYLPREDNISOLONE ACETATE 80 MG/ML
125 INJECTION, SUSPENSION INTRA-ARTICULAR; INTRALESIONAL; INTRAMUSCULAR; SOFT TISSUE ONCE
Refills: 0 | Status: COMPLETED | OUTPATIENT
Start: 2025-06-02 | End: 2025-06-02

## 2025-06-02 RX ORDER — IPRATROPIUM BROMIDE AND ALBUTEROL SULFATE .5; 2.5 MG/3ML; MG/3ML
3 SOLUTION RESPIRATORY (INHALATION) EVERY 6 HOURS
Refills: 0 | Status: DISCONTINUED | OUTPATIENT
Start: 2025-06-02 | End: 2025-06-06

## 2025-06-02 RX ORDER — ATORVASTATIN CALCIUM 80 MG/1
40 TABLET, FILM COATED ORAL AT BEDTIME
Refills: 0 | Status: DISCONTINUED | OUTPATIENT
Start: 2025-06-02 | End: 2025-06-06

## 2025-06-02 RX ORDER — CEFTRIAXONE 500 MG/1
1000 INJECTION, POWDER, FOR SOLUTION INTRAMUSCULAR; INTRAVENOUS EVERY 24 HOURS
Refills: 0 | Status: DISCONTINUED | OUTPATIENT
Start: 2025-06-02 | End: 2025-06-03

## 2025-06-02 RX ORDER — CLOPIDOGREL BISULFATE 75 MG/1
75 TABLET, FILM COATED ORAL DAILY
Refills: 0 | Status: DISCONTINUED | OUTPATIENT
Start: 2025-06-02 | End: 2025-06-06

## 2025-06-02 RX ORDER — AMIODARONE HYDROCHLORIDE 50 MG/ML
200 INJECTION, SOLUTION INTRAVENOUS DAILY
Refills: 0 | Status: DISCONTINUED | OUTPATIENT
Start: 2025-06-02 | End: 2025-06-06

## 2025-06-02 RX ORDER — NITROGLYCERIN 20 MG/G
5 OINTMENT TOPICAL
Qty: 50 | Refills: 0 | Status: DISCONTINUED | OUTPATIENT
Start: 2025-06-02 | End: 2025-06-02

## 2025-06-02 RX ORDER — DEXTROSE 50 % IN WATER 50 %
12.5 SYRINGE (ML) INTRAVENOUS ONCE
Refills: 0 | Status: DISCONTINUED | OUTPATIENT
Start: 2025-06-02 | End: 2025-06-06

## 2025-06-02 RX ORDER — SACUBITRIL AND VALSARTAN 49; 51 MG/1; MG/1
1 TABLET, FILM COATED ORAL
Refills: 0 | Status: DISCONTINUED | OUTPATIENT
Start: 2025-06-02 | End: 2025-06-04

## 2025-06-02 RX ORDER — METOPROLOL SUCCINATE 50 MG/1
50 TABLET, EXTENDED RELEASE ORAL DAILY
Refills: 0 | Status: DISCONTINUED | OUTPATIENT
Start: 2025-06-02 | End: 2025-06-05

## 2025-06-02 RX ORDER — METHYLPREDNISOLONE ACETATE 80 MG/ML
40 INJECTION, SUSPENSION INTRA-ARTICULAR; INTRALESIONAL; INTRAMUSCULAR; SOFT TISSUE DAILY
Refills: 0 | Status: DISCONTINUED | OUTPATIENT
Start: 2025-06-02 | End: 2025-06-03

## 2025-06-02 RX ORDER — TORSEMIDE 10 MG
20 TABLET ORAL EVERY 12 HOURS
Refills: 0 | Status: DISCONTINUED | OUTPATIENT
Start: 2025-06-02 | End: 2025-06-04

## 2025-06-02 RX ORDER — SPIRONOLACTONE 25 MG
25 TABLET ORAL EVERY 24 HOURS
Refills: 0 | Status: DISCONTINUED | OUTPATIENT
Start: 2025-06-02 | End: 2025-06-04

## 2025-06-02 RX ORDER — IPRATROPIUM BROMIDE AND ALBUTEROL SULFATE .5; 2.5 MG/3ML; MG/3ML
3 SOLUTION RESPIRATORY (INHALATION)
Refills: 0 | Status: COMPLETED | OUTPATIENT
Start: 2025-06-02 | End: 2025-06-02

## 2025-06-02 RX ORDER — INSULIN GLARGINE-YFGN 100 [IU]/ML
34 INJECTION, SOLUTION SUBCUTANEOUS EVERY MORNING
Refills: 0 | Status: DISCONTINUED | OUTPATIENT
Start: 2025-06-03 | End: 2025-06-06

## 2025-06-02 RX ORDER — INSULIN LISPRO 100 U/ML
INJECTION, SOLUTION INTRAVENOUS; SUBCUTANEOUS
Refills: 0 | Status: DISCONTINUED | OUTPATIENT
Start: 2025-06-02 | End: 2025-06-06

## 2025-06-02 RX ORDER — APIXABAN 2.5 MG/1
5 TABLET, FILM COATED ORAL EVERY 12 HOURS
Refills: 0 | Status: DISCONTINUED | OUTPATIENT
Start: 2025-06-02 | End: 2025-06-06

## 2025-06-02 RX ORDER — EZETIMIBE 10 MG/1
10 TABLET ORAL DAILY
Refills: 0 | Status: DISCONTINUED | OUTPATIENT
Start: 2025-06-02 | End: 2025-06-06

## 2025-06-02 RX ORDER — DEXTROSE 50 % IN WATER 50 %
25 SYRINGE (ML) INTRAVENOUS ONCE
Refills: 0 | Status: DISCONTINUED | OUTPATIENT
Start: 2025-06-02 | End: 2025-06-06

## 2025-06-02 RX ORDER — FENOFIBRATE 160 MG/1
145 TABLET ORAL DAILY
Refills: 0 | Status: DISCONTINUED | OUTPATIENT
Start: 2025-06-02 | End: 2025-06-06

## 2025-06-02 RX ORDER — MELATONIN 5 MG
5 TABLET ORAL AT BEDTIME
Refills: 0 | Status: DISCONTINUED | OUTPATIENT
Start: 2025-06-02 | End: 2025-06-06

## 2025-06-02 RX ORDER — FUROSEMIDE 10 MG/ML
40 INJECTION INTRAMUSCULAR; INTRAVENOUS ONCE
Refills: 0 | Status: COMPLETED | OUTPATIENT
Start: 2025-06-02 | End: 2025-06-02

## 2025-06-02 RX ORDER — TIOTROPIUM BROMIDE INHALATION SPRAY 3.12 UG/1
2 SPRAY, METERED RESPIRATORY (INHALATION) DAILY
Refills: 0 | Status: DISCONTINUED | OUTPATIENT
Start: 2025-06-02 | End: 2025-06-06

## 2025-06-02 RX ADMIN — Medication 5 MILLIGRAM(S): at 23:09

## 2025-06-02 RX ADMIN — CEFTRIAXONE 100 MILLIGRAM(S): 500 INJECTION, POWDER, FOR SOLUTION INTRAMUSCULAR; INTRAVENOUS at 16:37

## 2025-06-02 RX ADMIN — FUROSEMIDE 40 MILLIGRAM(S): 10 INJECTION INTRAMUSCULAR; INTRAVENOUS at 15:50

## 2025-06-02 RX ADMIN — ATORVASTATIN CALCIUM 40 MILLIGRAM(S): 80 TABLET, FILM COATED ORAL at 23:09

## 2025-06-02 RX ADMIN — FUROSEMIDE 40 MILLIGRAM(S): 10 INJECTION INTRAMUSCULAR; INTRAVENOUS at 23:12

## 2025-06-02 RX ADMIN — NITROGLYCERIN 5 MICROGRAM(S)/MIN: 20 OINTMENT TOPICAL at 16:52

## 2025-06-02 RX ADMIN — METHYLPREDNISOLONE ACETATE 40 MILLIGRAM(S): 80 INJECTION, SUSPENSION INTRA-ARTICULAR; INTRALESIONAL; INTRAMUSCULAR; SOFT TISSUE at 23:12

## 2025-06-02 RX ADMIN — Medication 50 MILLILITER(S): at 15:15

## 2025-06-02 RX ADMIN — NITROGLYCERIN 1.5 MICROGRAM(S)/MIN: 20 OINTMENT TOPICAL at 15:50

## 2025-06-02 RX ADMIN — METHYLPREDNISOLONE ACETATE 125 MILLIGRAM(S): 80 INJECTION, SUSPENSION INTRA-ARTICULAR; INTRALESIONAL; INTRAMUSCULAR; SOFT TISSUE at 15:18

## 2025-06-02 RX ADMIN — IPRATROPIUM BROMIDE AND ALBUTEROL SULFATE 3 MILLILITER(S): .5; 2.5 SOLUTION RESPIRATORY (INHALATION) at 15:17

## 2025-06-02 RX ADMIN — Medication 250 MILLIGRAM(S): at 17:40

## 2025-06-02 RX ADMIN — IPRATROPIUM BROMIDE AND ALBUTEROL SULFATE 3 MILLILITER(S): .5; 2.5 SOLUTION RESPIRATORY (INHALATION) at 15:15

## 2025-06-02 RX ADMIN — IPRATROPIUM BROMIDE AND ALBUTEROL SULFATE 3 MILLILITER(S): .5; 2.5 SOLUTION RESPIRATORY (INHALATION) at 15:33

## 2025-06-02 NOTE — ED PROVIDER NOTE - CLINICAL SUMMARY MEDICAL DECISION MAKING FREE TEXT BOX
On arrival patient was noted to be in A-fib RVR, hypertensive and tachycardic and tachypneic.  Patient was noted to be in [place] distress and immediately evaluated by me at bedside.  Respiratory therapy called for BiPAP.  Started on inline nebs as patient had diminished breath sounds concern for CHF versus COPD.  Oxygen saturation in the high 80s.  Subsequently patient was placed DuoNebs where oxygen trended to the high 90s.  Subsequently was placed on BiPAP at which point patient's labored breathing was eased.  Patient was tolerating BiPAP and saturating at 100%.  Inline DuoNebs were started.  Bedside ultrasound was performed which revealed diffuse B-lines.  Subsequently patient was treated with nitroglycerin at which point pressure dropped from 190 systolic to 140.  Evaluated but ICU attending at bedside.  As blood pressure was stable and tolerating BiPAP decision was made to admit the patient to stepdown.  Admitted to hospitalist service.

## 2025-06-02 NOTE — H&P ADULT - NSHPPHYSICALEXAM_GEN_ALL_CORE
127
Vital Signs Last 24 Hrs  T(C): 36.2 (02 Jun 2025 14:42), Max: 36.2 (02 Jun 2025 14:42)  T(F): 97.2 (02 Jun 2025 14:42), Max: 97.2 (02 Jun 2025 14:42)  HR: 90 (02 Jun 2025 18:29) (90 - 120)  BP: 146/78 (02 Jun 2025 18:29) (126/67 - 159/72)  RR: 29 (02 Jun 2025 18:29) (22 - 32)  SpO2: 99% (02 Jun 2025 18:29) (83% - 100%)    Parameters below as of 02 Jun 2025 18:29  Patient On (Oxygen Delivery Method): AVAP      GENERAL: NAD, lying in bed comfortably  HEAD:  Atraumatic, Normocephalic  EYES: EOMI, PERRLA, conjunctiva and sclera clear  ENT: Moist mucous membranes  NECK: Supple, No JVD  CHEST/LUNG: Clear to auscultation bilaterally; No rales, rhonchi, wheezing, or rubs. Unlabored respirations  HEART: Regular rate and rhythm; No murmurs, rubs, or gallops  ABDOMEN: Bowel sounds present; Soft, Nontender, Nondistended. No hepatomegally  EXTREMITIES:  2+ Peripheral Pulses, brisk capillary refill. 2+ pitting edema.   NERVOUS SYSTEM:  Alert & Oriented X3, speech clear. No deficits   MSK: FROM all 4 extremities, full and equal strength  SKIN: No rashes or lesions

## 2025-06-02 NOTE — H&P ADULT - HISTORY OF PRESENT ILLNESS
Patient is a  70 year old male with past medical history of  COPD (on 2L home O2), diabetes HFrEF 43%, ischemic cardiomyopathy s/p CRT-D, (Entresto, spironolactone 25, torsemide  20mg AVR, hypertension, paroxysmal A-fib (status post ablation 1/14/25) on amiodarone Eliquis  , CAD s/p PCI (2019), hyperlipidemia, chronic leukocytosis recent admission 5/13/25-5/25/25 who presents complaining of difficulty breathing that woke him up from sleep this afternoon associated with left sided chest pain. Describes the pain as "stabbing" in nature. Patient reports he was feeling well upon discharge home until this afternoon when symptoms began. Otherwise patient denies fever, chills, n/v/d, urinary or bowel complaints, leg pain, recent travel.     Pt on EMS arrival patient hypoxic at 78% on nonrebreather and patient started on bipap.

## 2025-06-02 NOTE — PATIENT PROFILE ADULT - FALL HARM RISK - HARM RISK INTERVENTIONS

## 2025-06-02 NOTE — ED PROVIDER NOTE - PHYSICAL EXAMINATION
GENERAL: NAD, lying in bed comfortably  HEAD:  Atraumatic, Normocephalic  EYES: EOMI, PERRLA, conjunctiva and sclera clear  ENT: Moist mucous membranes  NECK: Supple, No JVD  CHEST/LUNG: Clear to auscultation bilaterally; No rales, rhonchi, wheezing, or rubs. Unlabored respirations  HEART: Regular rate and rhythm; No murmurs, rubs, or gallops  ABDOMEN: Bowel sounds present; Soft, Nontender, Nondistended. No hepatomegally  EXTREMITIES:  2+ Peripheral Pulses, brisk capillary refill. 2+ pitting edema.   NERVOUS SYSTEM:  Alert & Oriented X3, speech clear. No deficits   MSK: FROM all 4 extremities, full and equal strength  SKIN: No rashes or lesions

## 2025-06-02 NOTE — CONSULT NOTE ADULT - NSCONSULTADDITIONALINFOA_GEN_ALL_CORE
IMPRESSION:        PLAN:    CNS:    HEENT:    PULMONARY:    CARDIOVASCULAR:    GI: GI prophylaxis.  Feeding     RENAL:    INFECTIOUS DISEASE:    HEMATOLOGICAL:  DVT prophylaxis.    ENDOCRINE:  Follow up FS.  Insulin protocol if needed.    MUSCULOSKELETAL:        CRITICAL CARE TIME SPENT: ***

## 2025-06-02 NOTE — H&P ADULT - NS ATTEND AMEND GEN_ALL_CORE FT
I agree with the above and made my editions    HFrEF exacerbation  flash pulmonary edema requiring nitro drip  COPD exacerbation   bilateral pneumonia (Hospital acquired suspected)     -Management as above

## 2025-06-02 NOTE — H&P ADULT - NSHPSOCIALHISTORY_GEN_ALL_CORE
Substance Use (street drugs): ( x ) never used  (  ) other:  Tobacco Usage: (X ) former smoker   Alcohol Usage: None

## 2025-06-02 NOTE — H&P ADULT - NSHPLABSRESULTS_GEN_ALL_CORE
LABS:                        13.2   23.36 )-----------( 484      ( 02 Jun 2025 15:03 )             43.8     06-02    138  |  98  |  20  ----------------------------<  60[L]  4.3   |  21  |  1.3    Ca    9.5      02 Jun 2025 15:03    TPro  7.4  /  Alb  4.0  /  TBili  0.6  /  DBili  x   /  AST  17  /  ALT  8   /  AlkPhos  108  06-02    LIVER FUNCTIONS - ( 02 Jun 2025 15:03 )  Alb: 4.0 g/dL / Pro: 7.4 g/dL / ALK PHOS: 108 U/L / ALT: 8 U/L / AST: 17 U/L / GGT: x             Urinalysis Basic - ( 02 Jun 2025 15:03 )    Color: x / Appearance: x / SG: x / pH: x  Gluc: 60 mg/dL / Ketone: x  / Bili: x / Urobili: x   Blood: x / Protein: x / Nitrite: x   Leuk Esterase: x / RBC: x / WBC x   Sq Epi: x / Non Sq Epi: x / Bacteria: x  +++++++++++++++++++++++++++++++++++++++++++++++++++++++++++++++++++++++++++++++  < from: Xray Chest 1 View- PORTABLE-Urgent (06.02.25 @ 15:26) >    IMPRESSION: Low lung volume.    Bilateral opacities, worse.  Left-sided permanent pacemaker.    --- End of Report ---    AISLINN BERTRAND MD; Attending Radiologist  This document has been electronically signed. Jun 2 2025  3:50PM    < end of copied text >

## 2025-06-02 NOTE — CONSULT NOTE ADULT - ASSESSMENT
IMPRESSION/PLAN:  SOB  CHF exacerbation  COPD  r/o PNA  Pulm HTN    at this time pat is impproved on AVAPS and lasix.  Would taper off lasix to NC as tolerated, continue lasix.  Obtain Cardio eval.  Pulm eval for Pulm HTN. Continue bronchodilators and steroids.  Continue empiric abx.  Reconsult ICU prn..    CNS:  avoid sedation    HEENT:oral care    PULMONARY:taper off AVAPS to NC as tolerated. Pulm eval, continue bronchodilators and steroids    CARDIOVASCULAR: resume home antihypertensives.  Cardio eval    GI: GI prophylaxis.  Feeding     RENAL: moniotr uo/creat    INFECTIOUS DISEASE:empiric abx at this time, reassess daily.    HEMATOLOGICAL:  DVT prophylaxis.    ENDOCRINE:  Follow up FS.  Insulin protocol if needed.    MUSCULOSKELETAL: oob to chair        CRITICAL CARE TIME SPENT: 40 minutes

## 2025-06-02 NOTE — CONSULT NOTE ADULT - SUBJECTIVE AND OBJECTIVE BOX
Patient is a 70y old  Male who presents with a chief complaint of sob      HPI:  69yo male with extensive PMH to include HTN, CAD, COPD, CHF, A FIB, DM, DYSLIPIDEMIA, GERD and CVA, pt presented w sob that awoke him from sleep, + cough, mild chest pain, pt w recent ed visits  for fall ( left AMA, and after having an argument with his wife.  In the ed the patient was found to be hypertensive as well as sob, pt started on AVAPS and nitro drip.  Lasix administered.  Steroids and bronchodilators given as well.  Upon eval pt was doing well speaking in full sentences.      PAST MEDICAL & SURGICAL HISTORY:  CHF (congestive heart failure)      Diabetes      CAD (coronary artery disease)      Chronic obstructive pulmonary disease (COPD)      HTN (hypertension)      Stented coronary artery      Dyslipidemia      GERD (gastroesophageal reflux disease)      Afib      CVA (cerebral vascular accident)      H/O heart artery stent      Heart valve replaced  aortic      History of Nissen fundoplication        Allergies    Bactrim (Unknown)  sulfa drugs (Unknown)    Intolerances      FAMILY HISTORY:    Home Medications:  atorvastatin 40 mg oral tablet: 1 tab(s) orally once a day (25 May 2025 03:34)  clopidogrel 75 mg oral tablet: 1 tab(s) orally once a day (25 May 2025 03:34)  eszopiclone 3 mg oral tablet: 1 tab(s) orally once a day (at bedtime) (25 May 2025 03:37)  ezetimibe 10 mg oral tablet: 1 orally once a day (25 May 2025 03:37)  famotidine 40 mg oral tablet: 1 tab(s) orally once a day (25 May 2025 03:37)  fenofibrate 145 mg oral tablet: 1 orally once a day (25 May 2025 03:37)  melatonin 5 mg oral tablet: 1 tab(s) orally once a day (at bedtime) (25 May 2025 03:37)  metFORMIN 500 mg oral tablet: 1 tab(s) orally 2 times a day (25 May 2025 03:37)  metoprolol succinate 50 mg oral tablet, extended release: 1 tab(s) orally once a day (25 May 2025 03:37)  Soaanz 20 mg oral tablet: 1 tab(s) orally every 12 hours (25 May 2025 04:26)  spironolactone 25 mg oral tablet: 1 tab(s) orally every 24 hours (25 May 2025 04:25)  Tresiba 100 units/mL subcutaneous solution: 34 unit(s) subcutaneous once a day (in the morning) (25 May 2025 04:25)    Occupation:  Alochol: Denied  Smoking: Denied  Drug Use: Denied  Marital Status:         ROS: as in HPI; All other systems reviewed are negative    ICU Vital Signs Last 24 Hrs  T(C): --  T(F): --  HR: 102 (02 Jun 2025 16:22) (102 - 120)  BP: 159/72 (02 Jun 2025 16:22) (126/67 - 159/72)  BP(mean): --  ABP: --  ABP(mean): --  RR: 29 (02 Jun 2025 16:22) (22 - 32)  SpO2: 99% (02 Jun 2025 16:22) (83% - 100%)    O2 Parameters below as of 02 Jun 2025 16:22  Patient On (Oxygen Delivery Method): AVAP              Physical Examination:    General: No acute distress.  Alert, oriented, interactive, nonfocal    HEENT: Pupils equal, reactive to light.  Symmetric.    PULM: Clear to auscultation bilaterally, no significant sputum production, tolerating AVAPS    CVS: Regular rate and rhythm, no murmurs, rubs, or gallops    ABD: Soft, nondistended, nontender, normoactive bowel sounds, no masses    EXT: No edema, nontender    SKIN: Warm and well perfused, no rashes noted.              I&O's Detail        LABS:                        13.2   23.36 )-----------( 484      ( 02 Jun 2025 15:03 )             43.8     02 Jun 2025 15:03    138    |  98     |  20     ----------------------------<  60     4.3     |  21     |  1.3      Ca    9.5        02 Jun 2025 15:03    TPro  7.4    /  Alb  4.0    /  TBili  0.6    /  DBili  x      /  AST  17     /  ALT  8      /  AlkPhos  108    02 Jun 2025 15:03  Amylase x     lipase x              CAPILLARY BLOOD GLUCOSE      POCT Blood Glucose.: 61 mg/dL (02 Jun 2025 14:40)      Urinalysis Basic - ( 02 Jun 2025 15:03 )    Color: x / Appearance: x / SG: x / pH: x  Gluc: 60 mg/dL / Ketone: x  / Bili: x / Urobili: x   Blood: x / Protein: x / Nitrite: x   Leuk Esterase: x / RBC: x / WBC x   Sq Epi: x / Non Sq Epi: x / Bacteria: x      Culture        MEDICATIONS  (STANDING):  azithromycin  IVPB 500 milliGRAM(s) IV Intermittent once    MEDICATIONS  (PRN):        RADIOLOGY: ***     CXR: b/l congestion, PPM in place      ECHO:    4/20/25    Summary:   1. LV Ejection Fraction by Beck's Method with a biplane EF of 30 %.   2. Severely decreased global left ventricular systolic function.   3. Endocardial visualization was enhanced with intravenous echo contrast.   4. Left atrial enlargement.   5. Mildly reduced RV systolic function.   6. Mild mitral valve regurgitation.   7. Thickening of the anterior and posterior mitral valve leaflets.   8. Moderate tricuspid regurgitation.   9. Bioprosthetic aortic valve in place. Peak/mean gradient 28.9/15.3   mmHg.  10. Estimated pulmonary artery systolic pressure is 59.9 mmHg assuming a   right atrial pressure of 3 mmHg, which is consistent with moderate   pulmonary hypertension.  11. Likely PFO noted by color doppler wtih left to right shunt.

## 2025-06-02 NOTE — H&P ADULT - ASSESSMENT
Assessment:  Patient is a  70 year old male with past medical history of  COPD (on 2L home O2), diabetes HFrEF 43%, ischemic cardiomyopathy s/p CRT-D, (Entresto, spironolactone 25, torsemide  20mg AVR, hypertension, paroxysmal A-fib (status post ablation 1/14/25) on amiodarone Eliquis, CAD s/p PCI (2019), hyperlipidemia, chronic leukocytosis recent admission 5/13/25-5/25/25 who presents complaining of difficulty breathing that woke him up from sleep this afternoon associated with left sided chest pain.     Pt on EMS arrival patient hypoxic at 78% on nonrebreather and patient started on bipap.   Nitroglycerin started 2/2 to elevated BP.      Plan:    # Acute hypoxic respiratory failure 2/2 to pulmonary edema/ CHF complicated by PNA    - Admit to inpatient level of care-telemetry.  - Patient evaluated by ICU recommendations implemented  - Monitor WBC and fever curve  - Follow up blood culture cultures, strep, legionella, MRSA, sputum culture  - Continue with empiric abx  - Chest x-ray: Bilateral opacities, worse. Left-sided permanent pacemaker.  - Last TTE that was done on 4/20/25 shows an EF of 30%, moderate pulmonary HTN, severely decreased systolic function.    - pBNP 2963--> increase from prior  - daily weights  - Strict Is and Os  - Fluid restriction  - Keep NPO while on bipap   - Continue with Lasix   - Pulmonology consult  - Continue with steriods      # Chronic leukocytosis    # DM type 2   - ISS  - Hold other diabetic meds used at home       #CAD with stents,   - Elevated troponin - suspect demand ischemia  - Cardiology consulted     COPD - noted    # A Fib (PAF)   - DOAC, amiodarone, metoprolol     # Hypertension   - Monitor on current meds    # GERD   - On pepcid at home    # History of CVA - noted     # Dyslipidemia - continue usual meds     CHG ordered  VTE ppx: heparin  GI ppx: PPI    Above discussed with Dr. Kumar   Assessment:  Patient is a  70 year old male with past medical history of  COPD (on 2L home O2), diabetes HFrEF 43%, ischemic cardiomyopathy s/p CRT-D, (Entresto, spironolactone 25, torsemide  20mg AVR, hypertension, paroxysmal A-fib (status post ablation 1/14/25) on amiodarone Eliquis, CAD s/p PCI (2019), hyperlipidemia, chronic leukocytosis recent admission 5/13/25-5/25/25 who presents complaining of difficulty breathing that woke him up from sleep this afternoon associated with left sided chest pain.     Pt on EMS arrival patient hypoxic at 78% on nonrebreather and patient started on bipap.   Nitroglycerin started 2/2 to elevated BP.      Plan:    # Acute hypoxic respiratory failure 2/2 to pulmonary edema/ CHF/ flash pulmonary edema complicated by PNA    - Admit to inpatient level of care-telemetry.  - Patient evaluated by ICU recommendations implemented  - Monitor WBC and fever curve  - Follow up blood culture cultures, strep, legionella, MRSA, sputum culture  - Continue with empiric abx  - Chest x-ray: Bilateral opacities, worse. Left-sided permanent pacemaker.  - Last TTE that was done on 4/20/25 shows an EF of 30%, moderate pulmonary HTN, severely decreased systolic function.    - pBNP 2963--> increase from prior  - daily weights  - Strict Is and Os  - Fluid restriction  - Keep NPO while on bipap   - Continue with Lasix   - Pulmonology consult  - Continue with steriods      # Chronic leukocytosis    # DM type 2   - ISS  - Hold other diabetic meds used at home       #CAD with stents,   - Elevated troponin - suspect demand ischemia  - Cardiology consulted     COPD - noted    # A Fib (PAF)   - DOAC, amiodarone, metoprolol     # Hypertension   - Monitor on current meds    # GERD   - On pepcid at home    # History of CVA - noted     # Dyslipidemia - continue usual meds     CHG ordered  GI ppx: PPI    Above discussed with Dr. Kumar   Assessment:  Patient is a  70 year old male with past medical history of  COPD (on 2L home O2), diabetes HFrEF 43%, ischemic cardiomyopathy s/p CRT-D, (Entresto, spironolactone 25, torsemide  20mg AVR, hypertension, paroxysmal A-fib (status post ablation 1/14/25) on amiodarone Eliquis, CAD s/p PCI (2019), hyperlipidemia, chronic leukocytosis recent admission 5/13/25-5/25/25 who presents complaining of difficulty breathing that woke him up from sleep this afternoon associated with left sided chest pain.     Pt on EMS arrival patient hypoxic at 78% on nonrebreather and patient started on bipap.   Nitroglycerin started 2/2 to elevated BP.      Plan:    # Acute hypoxic respiratory failure 2/2 to pulmonary edema/ HFrEF exacerbation flash pulmonary edema complicated by PNA  #Hx of COPD, possible component of COPD with CO2 retention, requiring bipap   - Admit to inpatient level of care-telemetry.  - Patient evaluated by ICU recommendations implemented  - Monitor WBC and fever curve  - Follow up blood culture cultures, strep, legionella, MRSA, sputum culture  - Continue with empiric abx broad spectrum because of recent hospitalization   - Chest x-ray: Bilateral opacities, worse. Left-sided permanent pacemaker.  - Last TTE that was done on 4/20/25 shows an EF of 30%, moderate pulmonary HTN, severely decreased systolic function.    - pBNP 2963--> increase from prior  - daily weights  - Strict Is and Os  - Fluid restriction  - Keep NPO while on bipap   - Continue with Lasix   - Pulmonology consult  -cardio consult   - Continue with steriods as wheezing present,       # Chronic leukocytosis  -monitor CBC    # DM type 2   - ISS  - Hold other diabetic meds used at home       #CAD with stents,   - Elevated troponin - suspect demand ischemia  - Cardiology consulted     COPD exacerbation as above     # A Fib (PAF)   - DOAC, amiodarone, metoprolol     # Hypertension   - Monitor on current meds    # GERD   - On pepcid at home    # History of CVA - noted     # Dyslipidemia - continue usual meds     CHG ordered  GI ppx: PPI    dvt ppx - doac    dvt     Above discussed with Dr. Kumar

## 2025-06-02 NOTE — ED PROVIDER NOTE - OBJECTIVE STATEMENT
71yo male with extensive PMH to include HTN, CAD, COPD, CHF, A FIB, DM, DYSLIPIDEMIA, GERD and CVA, pt presented w sob that awoke him from sleep, + cough, mild chest pain non raidiating. Patient said all the symptoms started today denied any previous fevers chills recent travel.

## 2025-06-03 LAB
BASE EXCESS BLDA CALC-SCNC: -0.9 MMOL/L — SIGNIFICANT CHANGE UP (ref -2–3)
HCO3 BLDA-SCNC: 20 MMOL/L — LOW (ref 21–28)
PCO2 BLDA: 25 MMHG — LOW (ref 35–48)
PH BLDA: 7.52 — HIGH (ref 7.35–7.45)
PO2 BLDA: 120 MMHG — HIGH (ref 83–108)
SAO2 % BLDA: 99.1 % — HIGH (ref 94–98)

## 2025-06-03 PROCEDURE — 99233 SBSQ HOSP IP/OBS HIGH 50: CPT

## 2025-06-03 PROCEDURE — 71045 X-RAY EXAM CHEST 1 VIEW: CPT | Mod: 26

## 2025-06-03 PROCEDURE — 99223 1ST HOSP IP/OBS HIGH 75: CPT

## 2025-06-03 PROCEDURE — 70450 CT HEAD/BRAIN W/O DYE: CPT | Mod: 26

## 2025-06-03 RX ORDER — CEFPODOXIME PROXETIL 200 MG/1
200 TABLET, FILM COATED ORAL EVERY 12 HOURS
Refills: 0 | Status: DISCONTINUED | OUTPATIENT
Start: 2025-06-03 | End: 2025-06-06

## 2025-06-03 RX ORDER — PREDNISONE 20 MG/1
40 TABLET ORAL DAILY
Refills: 0 | Status: DISCONTINUED | OUTPATIENT
Start: 2025-06-03 | End: 2025-06-06

## 2025-06-03 RX ORDER — FUROSEMIDE 10 MG/ML
80 INJECTION INTRAMUSCULAR; INTRAVENOUS DAILY
Refills: 0 | Status: DISCONTINUED | OUTPATIENT
Start: 2025-06-03 | End: 2025-06-03

## 2025-06-03 RX ORDER — DOXYCYCLINE HYCLATE 100 MG
100 TABLET ORAL EVERY 12 HOURS
Refills: 0 | Status: DISCONTINUED | OUTPATIENT
Start: 2025-06-03 | End: 2025-06-06

## 2025-06-03 RX ORDER — DEXTROMETHORPHAN HBR, GUAIFENESIN 200 MG/10ML
600 LIQUID ORAL EVERY 12 HOURS
Refills: 0 | Status: DISCONTINUED | OUTPATIENT
Start: 2025-06-03 | End: 2025-06-06

## 2025-06-03 RX ADMIN — Medication 20 MILLIGRAM(S): at 17:37

## 2025-06-03 RX ADMIN — METOPROLOL SUCCINATE 50 MILLIGRAM(S): 50 TABLET, EXTENDED RELEASE ORAL at 06:13

## 2025-06-03 RX ADMIN — IPRATROPIUM BROMIDE AND ALBUTEROL SULFATE 3 MILLILITER(S): .5; 2.5 SOLUTION RESPIRATORY (INHALATION) at 19:25

## 2025-06-03 RX ADMIN — SACUBITRIL AND VALSARTAN 1 TABLET(S): 49; 51 TABLET, FILM COATED ORAL at 06:12

## 2025-06-03 RX ADMIN — SACUBITRIL AND VALSARTAN 1 TABLET(S): 49; 51 TABLET, FILM COATED ORAL at 17:37

## 2025-06-03 RX ADMIN — Medication 25 MILLIGRAM(S): at 17:38

## 2025-06-03 RX ADMIN — DAPAGLIFLOZIN 10 MILLIGRAM(S): 5 TABLET, FILM COATED ORAL at 11:58

## 2025-06-03 RX ADMIN — CEFPODOXIME PROXETIL 200 MILLIGRAM(S): 200 TABLET, FILM COATED ORAL at 17:36

## 2025-06-03 RX ADMIN — Medication 100 MILLIGRAM(S): at 17:36

## 2025-06-03 RX ADMIN — TIOTROPIUM BROMIDE INHALATION SPRAY 2 PUFF(S): 3.12 SPRAY, METERED RESPIRATORY (INHALATION) at 09:39

## 2025-06-03 RX ADMIN — Medication 5 MILLIGRAM(S): at 20:09

## 2025-06-03 RX ADMIN — INSULIN LISPRO 4: 100 INJECTION, SOLUTION INTRAVENOUS; SUBCUTANEOUS at 16:57

## 2025-06-03 RX ADMIN — METHYLPREDNISOLONE ACETATE 40 MILLIGRAM(S): 80 INJECTION, SUSPENSION INTRA-ARTICULAR; INTRALESIONAL; INTRAMUSCULAR; SOFT TISSUE at 06:13

## 2025-06-03 RX ADMIN — EZETIMIBE 10 MILLIGRAM(S): 10 TABLET ORAL at 11:58

## 2025-06-03 RX ADMIN — APIXABAN 5 MILLIGRAM(S): 2.5 TABLET, FILM COATED ORAL at 17:38

## 2025-06-03 RX ADMIN — FUROSEMIDE 40 MILLIGRAM(S): 10 INJECTION INTRAMUSCULAR; INTRAVENOUS at 06:13

## 2025-06-03 RX ADMIN — Medication 20 MILLIGRAM(S): at 06:12

## 2025-06-03 RX ADMIN — DEXTROMETHORPHAN HBR, GUAIFENESIN 600 MILLIGRAM(S): 200 LIQUID ORAL at 20:09

## 2025-06-03 RX ADMIN — IPRATROPIUM BROMIDE AND ALBUTEROL SULFATE 3 MILLILITER(S): .5; 2.5 SOLUTION RESPIRATORY (INHALATION) at 08:57

## 2025-06-03 RX ADMIN — APIXABAN 5 MILLIGRAM(S): 2.5 TABLET, FILM COATED ORAL at 06:13

## 2025-06-03 RX ADMIN — FENOFIBRATE 145 MILLIGRAM(S): 160 TABLET ORAL at 11:58

## 2025-06-03 RX ADMIN — Medication 40 MILLIGRAM(S): at 11:58

## 2025-06-03 RX ADMIN — ATORVASTATIN CALCIUM 40 MILLIGRAM(S): 80 TABLET, FILM COATED ORAL at 20:09

## 2025-06-03 RX ADMIN — CLOPIDOGREL BISULFATE 75 MILLIGRAM(S): 75 TABLET, FILM COATED ORAL at 11:58

## 2025-06-03 RX ADMIN — AMIODARONE HYDROCHLORIDE 200 MILLIGRAM(S): 50 INJECTION, SOLUTION INTRAVENOUS at 06:12

## 2025-06-03 NOTE — PROGRESS NOTE ADULT - ASSESSMENT
Patient is a  70 year old male with past medical history of  COPD (on 2L home O2), diabetes HFrEF 43%, ischemic cardiomyopathy s/p CRT-D, (Entresto, spironolactone 25, torsemide  20mg AVR, hypertension, paroxysmal A-fib (status post ablation 1/14/25) on amiodarone Eliquis, CAD s/p PCI (2019), hyperlipidemia, chronic leukocytosis recent admission 5/13/25-5/25/25 who presents complaining of difficulty breathing that woke him up from sleep this afternoon associated with left sided chest pain.     Pt on EMS arrival patient hypoxic at 78% on nonrebreather and patient started on bipap.   Nitroglycerin started 2/2 to elevated BP.      Plan:    # Acute hypoxic respiratory failure 2/2 to pulmonary edema/ HFrEF exacerbation flash pulmonary edema complicated by PNA  #Hx of COPD, possible component of COPD with CO2 retention, requiring bipap   - Admit to inpatient level of care-telemetry.  - Patient evaluated by ICU recommendations implemented  - Monitor WBC and fever curve  - Follow up blood culture cultures, strep, legionella, MRSA, sputum culture  - Continue with empiric abx broad spectrum because of recent hospitalization - pt plling out IV, change to po   - Chest x-ray: Bilateral opacities, worse. Left-sided permanent pacemaker.  - Last TTE that was done on 4/20/25 shows an EF of 30%, moderate pulmonary HTN, severely decreased systolic function.    - pBNP 2963--> increase from prior  - daily weights  - Strict Is and Os  - Fluid restriction  - Keep NPO while on bipap   - Continue with Lasix -> pt refusing IV so converting IV lasix to PO   - Pulmonology consult  -cardio consult   -pulmonology consult appreciated - wean IV steroids to PO  -discussed ABX with ID on 6/3     #noncompliance  -pt is noncompliant with meds at home and also here, pulling out IVs and refusing meds  -palliaitve care consult pending     # Chronic leukocytosis  -monitor CBC    # DM type 2   - ISS  - Hold other diabetic meds used at home       #CAD with stents,   - Elevated troponin - suspect demand ischemia  - Cardiology consulted     COPD exacerbation as above     # A Fib (PAF)   - DOAC, amiodarone, metoprolol     # Hypertension   - Monitor on current meds    # GERD   - On pepcid at home    # History of CVA - noted     # Dyslipidemia - continue usual meds     CHG ordered  GI ppx: PPI    dvt ppx - doac    ---PB Handoff Note---    Pending/Follow up items (tests, labs, procedures,consults): cardio following, palliaitve, pending weaning O2, if pt can have O2 weaned off and pt wants to leave, he can leave AMA, but he does not have capacity at this point with [facility]     Indication for continued inpatient management: diuresis, abx     Goals of Care note:     Family contact:  Dispo (home, SNF, etc):    Prepare for DC order [x] - updated MARLENY is:     -------------------------------Time spent-----------------------------------------------------------------------  Initial visit:             mins [ ] -- 55-74 mins [ ]  Subsequent visit: 50-79 mins[ ]    -- 35-49mins [ ]     --------------------------------# and complexity of problems---------------------------------------------  [ x] chronic illness with severe exacerbation , progression, treatment side effect  [ ] acute or chronic illness with threat to life or bodily funtion                         --------------------------------Complexity of data reviewed ----------------------------------------------  The Patients complexity of data reviewed  is Low [ ] Moderate [ ] High [ x] due to the following:   A:      Reviewed prior external records [ ]      Considering/ Ordered a unique test:  Labs [x ] Imaging [x ] Stress Test  [ ] Other: Specify [ ]      Reviewed each unique test result [x ]      Assessment requiring an independent historian [ ]  B:       Independent interpretation of:   X-Ray [x ] EKG [ ]  Other: Specify  [ ]  C:       Discussion of management of tests with clinician outside of my group [x ]    -------------------------------------Risk of Morbidity-------------------------------------------------------  The Patients risk of morbidity is Low [ ] Moderate [ x] High [ ] due to the following:   Mod:  Prescription Drug Management [ x]  Decision regarding elective or emergent: minor surgery [ ] major surgery [ ]  Decision regarding hospitalization or escalation of hospital-level care [ ]  SDOH: Hearing/Vision impaired [ ] Food insecurity [ ] Homelessness/unsafe condition [ ]   Financial strain [ ] un/underemployed [ ] Lack of Transport [ ]  High:  DNR or De-Escalation of care because of poor prognosis [ ]  Drug Therapy requiring intensive monitoring for toxicity [ ]  Parenteral controlled substance [ ]  ------------------------------------------------------------------------------------------------------------------

## 2025-06-03 NOTE — PROGRESS NOTE ADULT - SUBJECTIVE AND OBJECTIVE BOX
Patient is a 70y old  Male who presents with a chief complaint of Shortness of breath (06-03-25)      Pt seen and examined at bedside. pt puling out IV, pt is argumentative, doesn't want treatment, pt is wearing 4L NC and pulling off mask, desaturating to 78%, pt calling me "an idiot" and cursing at me when telling him he shouldn't take the oxygen off.       PAST MEDICAL & SURGICAL HISTORY:  CHF (congestive heart failure)    Diabetes    CAD (coronary artery disease)    Chronic obstructive pulmonary disease (COPD)    HTN (hypertension)    Stented coronary artery    Dyslipidemia    GERD (gastroesophageal reflux disease)    Afib    CVA (cerebral vascular accident)    H/O heart artery stent    Heart valve replaced  aortic    History of Nissen fundoplication        VITAL SIGNS (Last 24 hrs):  T(C): 36.4 (06-03-25 @ 16:43), Max: 36.9 (06-03-25 @ 14:33)  HR: 102 (06-03-25 @ 16:43) (52 - 102)  BP: 124/69 (06-03-25 @ 16:43) (112/66 - 146/78)  RR: 18 (06-03-25 @ 16:43) (18 - 34)  SpO2: 99% (06-03-25 @ 16:43) (92% - 99%)  Wt(kg): --  Daily Height in cm: 162.56 (03 Jun 2025 14:33)    Daily     I&O's Summary      PHYSICAL EXAM:  GENERAL: NAD, well-developed  HEAD:  Atraumatic, Normocephalic  EYES: EOMI, PERRLA, conjunctiva and sclera clear  NECK: Supple, No JVD  CHEST/LUNG: Clear to auscultation bilaterally; No wheeze  HEART: Regular rate and rhythm; No murmurs, rubs, or gallops  ABDOMEN: Soft, Nontender, Nondistended; Bowel sounds present  EXTREMITIES:  2+ Peripheral Pulses, No clubbing, cyanosis, or edema  PSYCH: AAOx3  NEUROLOGY: non-focal  SKIN: No rashes or lesions    Labs Reviewed  Spoke to patient in regards to abnormal labs.    CBC Full  -  ( 02 Jun 2025 15:03 )  WBC Count : 23.36 K/uL  Hemoglobin : 13.2 g/dL  Hematocrit : 43.8 %  Platelet Count - Automated : 484 K/uL  Mean Cell Volume : 85.2 fL  Mean Cell Hemoglobin : 25.7 pg  Mean Cell Hemoglobin Concentration : 30.1 g/dL  Auto Neutrophil # : x  Auto Lymphocyte # : x  Auto Monocyte # : x  Auto Eosinophil # : x  Auto Basophil # : x  Auto Neutrophil % : x  Auto Lymphocyte % : x  Auto Monocyte % : x  Auto Eosinophil % : x  Auto Basophil % : x    BMP:    06-02 @ 15:03    Blood Urea Nitrogen - 20  Calcium - 9.5  Carbond Dioxide - 21  Chloride - 98  Creatinine - 1.3  Glucose - 60  Potassium - 4.3  Sodium - 138      Hemoglobin A1c -     Urine Culture:        COVID Labs  CRP:      D-Dimer:            Imaging reviewed independently and reviewed read        MEDICATIONS  (STANDING):  albuterol/ipratropium for Nebulization 3 milliLiter(s) Nebulizer every 6 hours  aMIOdarone    Tablet 200 milliGRAM(s) Oral daily  apixaban 5 milliGRAM(s) Oral every 12 hours  atorvastatin 40 milliGRAM(s) Oral at bedtime  cefpodoxime 200 milliGRAM(s) Oral every 12 hours  chlorhexidine 2% Cloths 1 Application(s) Topical <User Schedule>  clopidogrel Tablet 75 milliGRAM(s) Oral daily  dapagliflozin 10 milliGRAM(s) Oral daily  dextrose 5%. 1000 milliLiter(s) (100 mL/Hr) IV Continuous <Continuous>  dextrose 5%. 1000 milliLiter(s) (50 mL/Hr) IV Continuous <Continuous>  dextrose 50% Injectable 25 Gram(s) IV Push once  dextrose 50% Injectable 12.5 Gram(s) IV Push once  dextrose 50% Injectable 25 Gram(s) IV Push once  doxycycline monohydrate Capsule 100 milliGRAM(s) Oral every 12 hours  ezetimibe 10 milliGRAM(s) Oral daily  famotidine    Tablet 40 milliGRAM(s) Oral daily  fenofibrate Tablet 145 milliGRAM(s) Oral daily  furosemide    Tablet 80 milliGRAM(s) Oral daily  glucagon  Injectable 1 milliGRAM(s) IntraMuscular once  insulin glargine Injectable (LANTUS) 34 Unit(s) SubCutaneous every morning  insulin lispro (ADMELOG) corrective regimen sliding scale   SubCutaneous three times a day before meals  melatonin 5 milliGRAM(s) Oral at bedtime  metoprolol succinate ER 50 milliGRAM(s) Oral daily  predniSONE   Tablet 40 milliGRAM(s) Oral daily  sacubitril 24 mG/valsartan 26 mG 1 Tablet(s) Oral two times a day  spironolactone 25 milliGRAM(s) Oral every 24 hours  tiotropium 2.5 MICROgram(s) Inhaler 2 Puff(s) Inhalation daily  torsemide 20 milliGRAM(s) Oral every 12 hours    MEDICATIONS  (PRN):  dextrose Oral Gel 15 Gram(s) Oral once PRN Blood Glucose LESS THAN 70 milliGRAM(s)/deciliter  zolpidem 5 milliGRAM(s) Oral at bedtime PRN Insomnia

## 2025-06-03 NOTE — CONSULT NOTE ADULT - ASSESSMENT
70 year old male with past medical history of  COPD (on 2L home O2), diabetes HFrEF 43%, ischemic cardiomyopathy s/p CRT-D, (Entresto, spironolactone 25, torsemide  20mg AVR, hypertension, paroxysmal A-fib (status post ablation 1/14/25) on amiodarone Eliquis  , CAD s/p PCI (2019), hyperlipidemia, chronic leukocytosis recent admission 5/13/25-5/25/25 who presents complaining of difficulty breathing that woke him up from sleep this afternoon associated with left sided chest pain.  70 year old male with past medical history of  COPD (on 2L home O2), diabetes HFrEF 43%, ischemic cardiomyopathy s/p CRT-D, (Entresto, spironolactone 25, torsemide  20mg AVR, hypertension, paroxysmal A-fib (status post ablation 1/14/25) on amiodarone Eliquis  , CAD s/p PCI (2019), hyperlipidemia, chronic leukocytosis recent admission 5/13/25-5/25/25 who presents complaining of difficulty breathing that woke him up from sleep this afternoon associated with left sided chest pain.     ID is consulted for pneumonia  Afebrile  WBC Count: 23.36 (06-02-25 @ 15:03)  WBC Count: 14.60 (05-25-25 @ 04:30)  WBC Count: 13.32 (05-25-25 @ 00:27)  WBC Count: 15.13 (05-13-25 @ 10:40)  WBC Count: 12.92 (05-12-25 @ 23:01)  On NC    COVID/Flu/RSV PCR pending  BCx pending  Lactate 4.5 > 3.0    CT Chest No Cont (06.02.25 @ 20:51)   IMPRESSION:  Large bilateral pleural effusions with lower lobe compressive atelectasis.  Patchy groundglass opacities and consolidation of the upper lobes. No pneumothorax  Extensive coronary arteries and aortic calcifications    Antibiotic:  Ceftriaxone 6/2  Azithromycin 6/2      IMPRESSION:  Pulmonary edema  Acute HF exacerbation  Bilateral pleural effusions  COPD  obesity  Immunosuppression / Immunosenescence secondary to multiple comorbidities which could result in poor clinical outcome    RECOMMENDATIONS:  - Overall clinical picture is consistent with pulmonary edema, low suspicion for pneumonia  - D/C abx  - Diuresis as per primary  - COPD management as per pulm  - Offloading and frequent position changes, aspiration precaution  - Trend WBC, fever curve, transaminases, creatinine daily  - Please inform ID of any patient clinical change or any new pertinent laboratory or radiographic data      Nena Delcid D.O.  Attending Physician  Division of Infectious Diseases  [facility]  Please contact me via [employer] Teams

## 2025-06-03 NOTE — CONSULT NOTE ADULT - ASSESSMENT
70 year old male with past medical history of  COPD (on 2L home O2), diabetes HFrEF 43%, ischemic cardiomyopathy s/p CRT-D, (Entresto, spironolactone 25, torsemide  20mg AVR, hypertension, paroxysmal A-fib (status post ablation 1/14/25) on amiodarone Eliquis, CAD s/p PCI (2019), hyperlipidemia, chronic leukocytosis recent admission 5/13/25-5/25/25 who presents complaining of difficulty breathing that woke him up from sleep, associated with chest discomfort         Impression  #SOB: Multifactorial  #Chest Pain: ACS ruled out   #HFrEF (EF 30%, s/p CRT-D)  #CAD s/p PCI to RCA and LAD  #HTN/HLD/DM  #Paroxysmal AFib on Eliquis  #COPD        Plan:  - Was on BiPap and was started on Nitro gtt. Currently is on NC, and off Nitro gtt  - Pt currently denies CP. States breathing has improved  - Clinically does not appear in ADHF  - Anticipate to transition to PO diuretics in 1-2 days   - Trops elevated possibly 2/2 to demand in the setting of Hypoxia. Higher in the past  - Continue with current GDMT for HFrEF  - Cont Eliquis for Afib  - Monitor lytes

## 2025-06-03 NOTE — ED ADULT NURSE REASSESSMENT NOTE - NS ED NURSE REASSESS COMMENT FT1
pt took off tele leads despite education and encouragement, pt refusing lab, requesting to be discharged, admitting PA @[id number] made aware, will come talk with pt. Will continue to monitor.

## 2025-06-03 NOTE — CONSULT NOTE ADULT - ASSESSMENT
IMPRESSION:  decompensated heart failure  flash pulmonary edema  not COPD exacerbation, doubt pneumonia given rapid improvement with NIV and lasix  pulm HTN multifactorial secondary to cardiomyopathy reduced EF, likely LAW, baseline COPD  chronic hypoxia from all the above  B/L small pleural effusions to  pulmonary edema      SUGGEST:  repeat CXR  troponin, lipids, HA1c and TSH.  diuretics per cardiology.   Strict I&Os   O2 to maintain SaO2 >90<94%  NIV as needed  negative fluid balance  echo doppler if needed by cardiology  cardiac evaluation  doubt COPD exacerbation would quickly wean steroids  cont. OP inhalers for COPD  check Procal   gastritis prophylaxis  DVT/Gastritis prophylaxis  Upon D/C the patient will need ICS/LABA, LAMA, PRN albuterol,   would benefit from OP NPSG r/o LAW as trigger for heart failure during sleep  expect multiple re admissions due to the end stage nature of the disease IMPRESSION:  decompensated heart failure  flash pulmonary edema  not COPD exacerbation, doubt pneumonia given rapid improvement with NIV and lasix  pulm HTN multifactorial secondary to cardiomyopathy reduced EF, likely LAW, baseline COPD  chronic hypoxia from all the above  B/L small pleural effusions to  pulmonary edema      SUGGEST:  repeat CXR  troponin, lipids, HA1c and TSH.  diuretics per cardiology.   Strict I&Os   O2 to maintain SaO2 >90<94%  NIV as needed  negative fluid balance  echo doppler if needed by cardiology  cardiac management  doubt COPD exacerbation would quickly wean steroids  cont. OP inhalers for COPD  check Procal   gastritis prophylaxis  DVT/Gastritis prophylaxis  Upon D/C the patient will need ICS/LABA, LAMA, PRN albuterol,   would benefit from OP NPSG r/o LAW as trigger for heart failure during sleep  expect multiple re admissions due to the end stage nature of the disease

## 2025-06-03 NOTE — ED ADULT NURSE REASSESSMENT NOTE - NS ED NURSE REASSESS COMMENT FT1
f/s 112 at this time, TOM Hughes made aware and states to give 15 units of lantus instead of 34 units.

## 2025-06-03 NOTE — CONSULT NOTE ADULT - NS ATTEND AMEND GEN_ALL_CORE FT
Agree with NP Tyli    Acute decompensated systolic CHF in the setting of non compliance and dietary indiscretion  Mild bilateral pleural effusions notes on CT. Can switch to IV lasix 40 daily while in patient and hold Torsemide.   Actively wheezing on exam and elevated WBC, recommend pulm input.   Monitor Creatinine and maintain K> 4.0 AND Mg> 2.0.    Will follow.

## 2025-06-03 NOTE — CONSULT NOTE ADULT - SUBJECTIVE AND OBJECTIVE BOX
HPI:  Patient is a  70 year old male with past medical history of  COPD (on 2L home O2), diabetes HFrEF 43%, ischemic cardiomyopathy s/p CRT-D, (Entresto, spironolactone 25, torsemide  20mg AVR, hypertension, paroxysmal A-fib (status post ablation 1/14/25) on amiodarone Eliquis  , CAD s/p PCI (2019), hyperlipidemia, chronic leukocytosis recent admission 5/13/25-5/25/25 who presents complaining of difficulty breathing that woke him up from sleep this afternoon associated with left sided chest pain. Describes the pain as "stabbing" in nature. Patient reports he was feeling well upon discharge home until this afternoon when symptoms began. Otherwise patient denies fever, chills, n/v/d, urinary or bowel complaints, leg pain, recent travel.     Pt on EMS arrival patient hypoxic at 78% on nonrebreather and patient started on bipap.  (02 Jun 2025 18:45)        HPI-Cardiology   Pt with the above Hx and HPI, evaluated at bedside. Pt presented for eval of sudden onset SOB that woke him from sleep associated with chest discomfort. Pt was recently admitted 5/13-5-25 with similar symptoms. Denies any other cardiac related symptoms. Radiology tests and hospital records, were reviewed, as well as previous notes on this patient.      PAST MEDICAL & SURGICAL HISTORY  CHF (congestive heart failure)    Diabetes    CAD (coronary artery disease)    Chronic obstructive pulmonary disease (COPD)    HTN (hypertension)    Stented coronary artery    Dyslipidemia    GERD (gastroesophageal reflux disease)    Afib    CVA (cerebral vascular accident)    H/O heart artery stent    Heart valve replaced  aortic    History of Nissen fundoplication        ALLERGIES:  Bactrim (Unknown)  sulfa drugs (Unknown)      MEDICATIONS:  MEDICATIONS  (STANDING):  albuterol/ipratropium for Nebulization 3 milliLiter(s) Nebulizer every 6 hours  aMIOdarone    Tablet 200 milliGRAM(s) Oral daily  apixaban 5 milliGRAM(s) Oral every 12 hours  atorvastatin 40 milliGRAM(s) Oral at bedtime  azithromycin  IVPB 500 milliGRAM(s) IV Intermittent every 24 hours  cefTRIAXone   IVPB 1000 milliGRAM(s) IV Intermittent every 24 hours  chlorhexidine 2% Cloths 1 Application(s) Topical <User Schedule>  clopidogrel Tablet 75 milliGRAM(s) Oral daily  dapagliflozin 10 milliGRAM(s) Oral daily  dextrose 5%. 1000 milliLiter(s) (100 mL/Hr) IV Continuous <Continuous>  dextrose 5%. 1000 milliLiter(s) (50 mL/Hr) IV Continuous <Continuous>  dextrose 50% Injectable 25 Gram(s) IV Push once  dextrose 50% Injectable 12.5 Gram(s) IV Push once  dextrose 50% Injectable 25 Gram(s) IV Push once  ezetimibe 10 milliGRAM(s) Oral daily  famotidine    Tablet 40 milliGRAM(s) Oral daily  fenofibrate Tablet 145 milliGRAM(s) Oral daily  furosemide   Injectable 40 milliGRAM(s) IV Push daily  glucagon  Injectable 1 milliGRAM(s) IntraMuscular once  insulin glargine Injectable (LANTUS) 34 Unit(s) SubCutaneous every morning  insulin lispro (ADMELOG) corrective regimen sliding scale   SubCutaneous three times a day before meals  melatonin 5 milliGRAM(s) Oral at bedtime  methylPREDNISolone sodium succinate Injectable 40 milliGRAM(s) IV Push daily  metoprolol succinate ER 50 milliGRAM(s) Oral daily  sacubitril 24 mG/valsartan 26 mG 1 Tablet(s) Oral two times a day  spironolactone 25 milliGRAM(s) Oral every 24 hours  tiotropium 2.5 MICROgram(s) Inhaler 2 Puff(s) Inhalation daily  torsemide 20 milliGRAM(s) Oral every 12 hours    MEDICATIONS  (PRN):  dextrose Oral Gel 15 Gram(s) Oral once PRN Blood Glucose LESS THAN 70 milliGRAM(s)/deciliter  zolpidem 5 milliGRAM(s) Oral at bedtime PRN Insomnia      HOME MEDICATIONS:  Home Medications:  atorvastatin 40 mg oral tablet: 1 tab(s) orally once a day (25 May 2025 03:34)  clopidogrel 75 mg oral tablet: 1 tab(s) orally once a day (25 May 2025 03:34)  eszopiclone 3 mg oral tablet: 1 tab(s) orally once a day (at bedtime) (25 May 2025 03:37)  ezetimibe 10 mg oral tablet: 1 orally once a day (25 May 2025 03:37)  famotidine 40 mg oral tablet: 1 tab(s) orally once a day (25 May 2025 03:37)  fenofibrate 145 mg oral tablet: 1 orally once a day (25 May 2025 03:37)  melatonin 5 mg oral tablet: 1 tab(s) orally once a day (at bedtime) (25 May 2025 03:37)  metFORMIN 500 mg oral tablet: 1 tab(s) orally 2 times a day (25 May 2025 03:37)  metoprolol succinate 50 mg oral tablet, extended release: 1 tab(s) orally once a day (25 May 2025 03:37)  Soaanz 20 mg oral tablet: 1 tab(s) orally every 12 hours (25 May 2025 04:26)  spironolactone 25 mg oral tablet: 1 tab(s) orally every 24 hours (25 May 2025 04:25)  Tresiba 100 units/mL subcutaneous solution: 34 unit(s) subcutaneous once a day (in the morning) (25 May 2025 04:25)      VITALS:   T(F): 97.2 (06-02 @ 14:42), Max: 97.2 (06-02 @ 14:42)  HR: 98 (06-03 @ 07:29) (90 - 120)  BP: 128/82 (06-03 @ 07:29) (126/67 - 159/72)  BP(mean): --  RR: 20 (06-03 @ 07:29) (20 - 32)  SpO2: 92% (06-03 @ 07:29) (83% - 100%)          REVIEW OF SYSTEMS:  See HPI        PHYSICAL EXAM:  NEURO: patient is awake , alert and oriented  GEN: Not in acute distress  NECK: no thyroid enlargement, no JVD  LUNGS: Decreased at bases to auscultation bilaterally   CARDIOVASCULAR: S1/S2 present, RRR , no murmurs or rubs, no carotid bruits,  + PP bilaterally  ABD: Soft, non-tender, non-distended, +BS  EXT: No JUAN  SKIN: Intact        LABS:                        13.2   23.36 )-----------( 484      ( 02 Jun 2025 15:03 )             43.8     06-02    138  |  98  |  20  ----------------------------<  60[L]  4.3   |  21  |  1.3    Ca    9.5      02 Jun 2025 15:03    TPro  7.4  /  Alb  4.0  /  TBili  0.6  /  DBili  x   /  AST  17  /  ALT  8   /  AlkPhos  108  06-02      Lactate, Blood: 2.5 mmol/L *H* (06-02-25 @ 17:27)          04-19 Chol 209[H] LDL -- HDL 34[L] Trig 116            RADIOLOGY:  -CXR:  < from: Xray Chest 1 View- PORTABLE-Urgent (06.02.25 @ 15:26) >  IMPRESSION: Low lung volume.    Bilateral opacities, worse.    Left-sided permanent pacemaker.    --- End of Report ---      < end of copied text >      -TTE:  < from: TTE Echo Complete w/o Contrast w/ Doppler (04.20.25 @ 13:14) >    Summary:   1. LV Ejection Fraction by Beck's Method with a biplane EF of 30 %.   2. Severely decreased global left ventricular systolic function.   3. Endocardial visualization was enhanced with intravenous echo contrast.   4. Left atrial enlargement.   5. Mildly reduced RV systolic function.   6. Mild mitral valve regurgitation.   7. Thickening of the anterior and posterior mitral valve leaflets.   8. Moderate tricuspid regurgitation.   9. Bioprosthetic aortic valve in place. Peak/mean iqhgxfsh17.9/15.3   mmHg.  10. Estimated pulmonary artery systolic pressure is 59.9 mmHg assuming a   right atrial pressure of 3 mmHg, which is consistent with moderate   pulmonary hypertension.  11. Likely PFO noted by color doppler wtih left to right shunt.      < end of copied text >        ECG:  < from: 12 Lead ECG (06.02.25 @ 14:52) >  Atrial fibrillation with rapid ventricular response withpremature ventricular  or aberrantly conducted complexes  ventricular-paced complexes  Left axis deviation  Nonspecific T wave abnormality , probably digitalis effect  Abnormal ECG    Confirmed by Edward Riley ([id number]) on 6/3/2025 5:39:50 AM    < end of copied text >

## 2025-06-03 NOTE — CONSULT NOTE ADULT - SUBJECTIVE AND OBJECTIVE BOX
HPI:  Patient is a  70 year old male with past medical history of  COPD (on 2L home O2), diabetes HFrEF 43%, ischemic cardiomyopathy s/p CRT-D, (Entresto, spironolactone 25, torsemide  20mg AVR, hypertension, paroxysmal A-fib (status post ablation 1/14/25) on amiodarone Eliquis  , CAD s/p PCI (2019), hyperlipidemia, chronic leukocytosis recent admission 5/13/25-5/25/25 who presents complaining of difficulty breathing that woke him up from sleep this afternoon associated with left sided chest pain. Describes the pain as "stabbing" in nature. Patient reports he was feeling well upon discharge home until this afternoon when symptoms began. Otherwise patient denies fever, chills, n/v/d, urinary or bowel complaints, leg pain, recent travel.     Pt on EMS arrival patient hypoxic at 78% on nonrebreather and patient started on bipap.  (02 Jun 2025 18:45)     I reviewed the radiology tests and hospital record including the ED chart.  I reviewed the other consultants comments that are available in the chart.    CC/ HPI Patient is a 70y old  Male who presents with a chief complaint of     FAMILY HISTORY:      PAST MEDICAL & SURGICAL HISTORY:  CHF (congestive heart failure)      Diabetes      CAD (coronary artery disease)      Chronic obstructive pulmonary disease (COPD)      HTN (hypertension)      Stented coronary artery      Dyslipidemia      GERD (gastroesophageal reflux disease)      Afib      CVA (cerebral vascular accident)      H/O heart artery stent      Heart valve replaced  aortic      History of Nissen fundoplication          FAMILY HISTORY:      SOCIAL HISTORY:  Smoking: __ packs x ___ years  EtOH Use:    Bactrim (Unknown)  sulfa drugs (Unknown)    Soc  see Hpi.  Home prescriptions  amiodarone 200 mg oral tablet: 1 tab(s) orally once a day  apixaban 5 mg oral tablet: 1 tab(s) orally every 12 hours  atorvastatin 40 mg oral tablet: 1 tab(s) orally once a day  clopidogrel 75 mg oral tablet: 1 tab(s) orally once a day  dapagliflozin 10 mg oral tablet: 1 tab(s) orally once a day  Entresto 24 mg-26 mg oral tablet: 1 tab(s) orally 2 times a day  eszopiclone 3 mg oral tablet: 1 tab(s) orally once a day (at bedtime)  ezetimibe 10 mg oral tablet: 1 orally once a day  famotidine 40 mg oral tablet: 1 tab(s) orally once a day  fenofibrate 145 mg oral tablet: 1 orally once a day  melatonin 5 mg oral tablet: 1 tab(s) orally once a day (at bedtime)  metFORMIN 500 mg oral tablet: 1 tab(s) orally 2 times a day  metoprolol succinate 50 mg oral tablet, extended release: 1 tab(s) orally once a day  Soaanz 20 mg oral tablet: 1 tab(s) orally every 12 hours  spironolactone 25 mg oral tablet: 1 tab(s) orally every 24 hours  tiotropium 2.5 mcg/inh inhalation aerosol: 2 inhaled once a day  Tresiba 100 units/mL subcutaneous solution: 34 unit(s) subcutaneous once a day (in the morning)    MEDICATIONS  (STANDING):  albuterol/ipratropium for Nebulization 3 milliLiter(s) Nebulizer every 6 hours  aMIOdarone    Tablet 200 milliGRAM(s) Oral daily  apixaban 5 milliGRAM(s) Oral every 12 hours  atorvastatin 40 milliGRAM(s) Oral at bedtime  azithromycin  IVPB 500 milliGRAM(s) IV Intermittent every 24 hours  cefTRIAXone   IVPB 1000 milliGRAM(s) IV Intermittent every 24 hours  chlorhexidine 2% Cloths 1 Application(s) Topical <User Schedule>  clopidogrel Tablet 75 milliGRAM(s) Oral daily  dapagliflozin 10 milliGRAM(s) Oral daily  dextrose 5%. 1000 milliLiter(s) (100 mL/Hr) IV Continuous <Continuous>  dextrose 5%. 1000 milliLiter(s) (50 mL/Hr) IV Continuous <Continuous>  dextrose 50% Injectable 25 Gram(s) IV Push once  dextrose 50% Injectable 12.5 Gram(s) IV Push once  dextrose 50% Injectable 25 Gram(s) IV Push once  ezetimibe 10 milliGRAM(s) Oral daily  famotidine    Tablet 40 milliGRAM(s) Oral daily  fenofibrate Tablet 145 milliGRAM(s) Oral daily  furosemide   Injectable 40 milliGRAM(s) IV Push daily  glucagon  Injectable 1 milliGRAM(s) IntraMuscular once  insulin glargine Injectable (LANTUS) 34 Unit(s) SubCutaneous every morning  insulin lispro (ADMELOG) corrective regimen sliding scale   SubCutaneous three times a day before meals  melatonin 5 milliGRAM(s) Oral at bedtime  methylPREDNISolone sodium succinate Injectable 40 milliGRAM(s) IV Push daily  metoprolol succinate ER 50 milliGRAM(s) Oral daily  sacubitril 24 mG/valsartan 26 mG 1 Tablet(s) Oral two times a day  spironolactone 25 milliGRAM(s) Oral every 24 hours  tiotropium 2.5 MICROgram(s) Inhaler 2 Puff(s) Inhalation daily  torsemide 20 milliGRAM(s) Oral every 12 hours    MEDICATIONS  (PRN):  dextrose Oral Gel 15 Gram(s) Oral once PRN Blood Glucose LESS THAN 70 milliGRAM(s)/deciliter  zolpidem 5 milliGRAM(s) Oral at bedtime PRN Insomnia      T(C): 36.2 (06-02-25 @ 14:42), Max: 36.2 (06-02-25 @ 14:42)  HR: 90 (06-02-25 @ 18:29) (90 - 120)  BP: 146/78 (06-02-25 @ 18:29) (126/67 - 159/72)  RR: 29 (06-02-25 @ 18:29) (22 - 32)  SpO2: 98% (06-02-25 @ 20:32) (83% - 100%)  ICU Vital Signs Last 24 Hrs  T(C): 36.2 (02 Jun 2025 14:42), Max: 36.2 (02 Jun 2025 14:42)  T(F): 97.2 (02 Jun 2025 14:42), Max: 97.2 (02 Jun 2025 14:42)  HR: 90 (02 Jun 2025 18:29) (90 - 120)  BP: 146/78 (02 Jun 2025 18:29) (126/67 - 159/72)  BP(mean): --  ABP: --  ABP(mean): --  RR: 29 (02 Jun 2025 18:29) (22 - 32)  SpO2: 98% (02 Jun 2025 20:32) (83% - 100%)    O2 Parameters below as of 02 Jun 2025 18:29  Patient On (Oxygen Delivery Method): AVAP          I&O's Detail    Drug Dosing Weight  Height (cm): 160 (02 Jun 2025 14:42)  Weight (kg): 75.7 (30 May 2025 11:11)  BMI (kg/m2): 29.6 (02 Jun 2025 14:42)  BSA (m2): 1.79 (02 Jun 2025 14:42)    LABS:                          13.2   23.36 )-----------( 484      ( 02 Jun 2025 15:03 )             43.8     06-02    138  |  98  |  20  ----------------------------<  60[L]  4.3   |  21  |  1.3    Ca    9.5      02 Jun 2025 15:03    TPro  7.4  /  Alb  4.0  /  TBili  0.6  /  DBili  x   /  AST  17  /  ALT  8   /  AlkPhos  108  06-02    LIVER FUNCTIONS - ( 02 Jun 2025 15:03 )  Alb: 4.0 g/dL / Pro: 7.4 g/dL / ALK PHOS: 108 U/L / ALT: 8 U/L / AST: 17 U/L / GGT: x                   Lactate, Blood: 2.5 mmol/L (06-02-25 @ 17:27)      COVID-19 PCR: NotDetec (15 May 2025 09:50)  SARS-CoV-2: NotDetec (10 Dameon 2025 15:31)  SARS-CoV-2: NotDetec (02 Jan 2025 20:30)    Urinalysis Basic - ( 02 Jun 2025 15:03 )    Color: x / Appearance: x / SG: x / pH: x  Gluc: 60 mg/dL / Ketone: x  / Bili: x / Urobili: x   Blood: x / Protein: x / Nitrite: x   Leuk Esterase: x / RBC: x / WBC x   Sq Epi: x / Non Sq Epi: x / Bacteria: x        azithromycin  IVPB 500 milliGRAM(s) IV Intermittent every 24 hours  cefTRIAXone   IVPB 1000 milliGRAM(s) IV Intermittent every 24 hours            RADIOLOGY:  I have personally reviewed all chest and other pertinent radiology films.      REVIEW OF SYSTEMS:   see Hpi.    PHYSICAL EXAM:       · ENMT:   Airway patent,   Nasal mucosa clear.      · EYES:   Clear bilaterally,   pupils equal,   round and reactive to light.    · CARDIAC:   Normal rate,   regular rhythm.    Heart sounds S1, S2.   no thrills or bruits on palpitation  normal  cardiac impulse  No murmurs, no rubs or gallops on auscultation  no edema    · RESPIRATORY:   rales,   normal chest expansion  not tachypneic,  no retractions or use of accessory muscles  palpation of chest is normal with no fremitus  percussion of chest demonstrates no hyperresonance or dullness    · GASTROINTESTINAL:  Abdomen soft,   non-tender,   no guarding,   + BS  liver spleen not palpable    · HEME LYMPH:   no splenomegaly.  No cervical  lymphadenopathy.  no inguinal lymphadenopathy

## 2025-06-03 NOTE — CONSULT NOTE ADULT - SUBJECTIVE AND OBJECTIVE BOX
INFECTIOUS DISEASE CONSULT NOTE    Patient is a 70y old  Male who presents with a chief complaint of   HPI:  Patient is a  70 year old male with past medical history of  COPD (on 2L home O2), diabetes HFrEF 43%, ischemic cardiomyopathy s/p CRT-D, (Entresto, spironolactone 25, torsemide  20mg AVR, hypertension, paroxysmal A-fib (status post ablation 1/14/25) on amiodarone Eliquis  , CAD s/p PCI (2019), hyperlipidemia, chronic leukocytosis recent admission 5/13/25-5/25/25 who presents complaining of difficulty breathing that woke him up from sleep this afternoon associated with left sided chest pain. Describes the pain as "stabbing" in nature. Patient reports he was feeling well upon discharge home until this afternoon when symptoms began. Otherwise patient denies fever, chills, n/v/d, urinary or bowel complaints, leg pain, recent travel.     Pt on EMS arrival patient hypoxic at 78% on nonrebreather and patient started on bipap.  (02 Jun 2025 18:45)         Prior hospital charts reviewed [Yes]  Primary team notes reviewed [Yes]  Other consultant notes reviewed [Yes]    REVIEW OF SYSTEMS:      PAST MEDICAL & SURGICAL HISTORY:  CHF (congestive heart failure)      Diabetes      CAD (coronary artery disease)      Chronic obstructive pulmonary disease (COPD)      HTN (hypertension)      Stented coronary artery      Dyslipidemia      GERD (gastroesophageal reflux disease)      Afib      CVA (cerebral vascular accident)      H/O heart artery stent      Heart valve replaced  aortic      History of Nissen fundoplication          SOCIAL HISTORY:  - Born in [place], migrated [place] in 19XX  - Currently working as / Retired  - Lives with _____; no pets  - No recent travel  - Denies tobacco use  - Denies alcohol use  - Denies illicit drug use  - Currently sexually active, has one male/female sexual partner    FAMILY HISTORY:      Allergies:  Bactrim (Unknown)  sulfa drugs (Unknown)      ANTIMICROBIALS:  azithromycin  IVPB 500 every 24 hours  cefTRIAXone   IVPB 1000 every 24 hours      ANTIMICROBIALS (past 90 days):  MEDICATIONS  (STANDING):  azithromycin  IVPB   250 mL/Hr IV Intermittent (06-02-25 @ 17:40)    cefTRIAXone   IVPB   100 mL/Hr IV Intermittent (06-02-25 @ 16:37)        OTHER MEDS:   MEDICATIONS  (STANDING):  albuterol/ipratropium for Nebulization 3 every 6 hours  aMIOdarone    Tablet 200 daily  apixaban 5 every 12 hours  atorvastatin 40 at bedtime  clopidogrel Tablet 75 daily  dapagliflozin 10 daily  dextrose 50% Injectable 25 once  dextrose 50% Injectable 12.5 once  dextrose 50% Injectable 25 once  dextrose Oral Gel 15 once PRN  ezetimibe 10 daily  famotidine    Tablet 40 daily  fenofibrate Tablet 145 daily  furosemide   Injectable 40 daily  glucagon  Injectable 1 once  insulin glargine Injectable (LANTUS) 34 every morning  insulin lispro (ADMELOG) corrective regimen sliding scale  three times a day before meals  melatonin 5 at bedtime  methylPREDNISolone sodium succinate Injectable 40 daily  metoprolol succinate ER 50 daily  sacubitril 24 mG/valsartan 26 mG 1 two times a day  spironolactone 25 every 24 hours  tiotropium 2.5 MICROgram(s) Inhaler 2 daily  torsemide 20 every 12 hours  zolpidem 5 at bedtime PRN      VITALS:  Vital Signs Last 24 Hrs  T(F): 97.2 (06-02-25 @ 14:42), Max: 97.8 (05-30-25 @ 11:11)    Vital Signs Last 24 Hrs  HR: 98 (06-03-25 @ 07:29) (90 - 120)  BP: 128/82 (06-03-25 @ 07:29) (126/67 - 159/72)  RR: 20 (06-03-25 @ 09:03)  SpO2: 94% (06-03-25 @ 09:03) (83% - 100%)  Wt(kg): --    EXAM:    Labs:                        13.2   23.36 )-----------( 484      ( 02 Jun 2025 15:03 )             43.8     06-02    138  |  98  |  20  ----------------------------<  60[L]  4.3   |  21  |  1.3    Ca    9.5      02 Jun 2025 15:03    TPro  7.4  /  Alb  4.0  /  TBili  0.6  /  DBili  x   /  AST  17  /  ALT  8   /  AlkPhos  108  06-02      WBC Trend:  WBC Count: 23.36 (06-02-25 @ 15:03)      Auto Neutrophil #: 7.72 K/uL (01-14-25 @ 05:40)  Auto Neutrophil #: 12.27 K/uL (01-11-25 @ 06:13)  Auto Neutrophil #: 15.48 K/uL (01-10-25 @ 05:31)  Auto Neutrophil #: 12.68 K/uL (01-09-25 @ 05:36)  Auto Neutrophil #: 9.11 K/uL (01-08-25 @ 06:22)      Creatine Trend:  Creatinine: 1.3 (06-02)      Liver Biochemical Testing Trend:  Alanine Aminotransferase (ALT/SGPT): 8 (06-02)  Alanine Aminotransferase (ALT/SGPT): 12 (05-25)  Alanine Aminotransferase (ALT/SGPT): 10 (05-12)  Alanine Aminotransferase (ALT/SGPT): 13 (04-28)  Alanine Aminotransferase (ALT/SGPT): 9 (04-24)  Aspartate Aminotransferase (AST/SGOT): 17 (06-02-25 @ 15:03)  Aspartate Aminotransferase (AST/SGOT): 25 (05-25-25 @ 00:27)  Aspartate Aminotransferase (AST/SGOT): 12 (05-12-25 @ 23:01)  Aspartate Aminotransferase (AST/SGOT): 15 (04-28-25 @ 18:12)  Aspartate Aminotransferase (AST/SGOT): 12 (04-24-25 @ 09:48)  Bilirubin Total: 0.6 (06-02)  Bilirubin Total: 0.2 (05-25)  Bilirubin Total: 0.2 (05-12)  Bilirubin Total: 0.3 (04-28)  Bilirubin Total: 0.4 (04-24)      Trend LDH          Urinalysis Basic - ( 02 Jun 2025 15:03 )    Color: x / Appearance: x / SG: x / pH: x  Gluc: 60 mg/dL / Ketone: x  / Bili: x / Urobili: x   Blood: x / Protein: x / Nitrite: x   Leuk Esterase: x / RBC: x / WBC x   Sq Epi: x / Non Sq Epi: x / Bacteria: x          MICROBIOLOGY:    MRSA PCR Result.: Positive (04-21-25 @ 11:36)  MRSA PCR Result.: Negative (01-02-25 @ 20:30)  MRSA PCR Result.: Negative (01-02-25 @ 05:00)      HIV-1/2 Combo Result: Nonreact (01-04-25 @ 05:58)      COVID-19 PCR: NotDetec (05-15-25 @ 09:50)      Troponin T, High Sensitivity Result: 117 (06-02)    Blood Gas Venous - Lactate: 3.0 (06-02 @ 18:20)  Lactate, Blood: 2.5 (06-02 @ 17:27)  Blood Gas Venous - Lactate: 4.5 (06-02 @ 15:20)    A1C with Estimated Average Glucose Result: 9.7 % (04-19-25 @ 11:44)  A1C with Estimated Average Glucose Result: 9.7 % (03-30-25 @ 06:05)  A1C with Estimated Average Glucose Result: 9.9 % (12-28-24 @ 07:18)        RADIOLOGY:  imaging below personally reviewed    < from: Xray Chest 1 View- PORTABLE-Urgent (06.02.25 @ 15:26) >  IMPRESSION: Low lung volume.    Bilateral opacities, worse.    Left-sided permanent pacemaker.    < end of copied text >   INFECTIOUS DISEASE CONSULT NOTE    Patient is a 70y old  Male who presents with a chief complaint of shortness of breath  HPI:  Patient is a  70 year old male with past medical history of  COPD (on 2L home O2), diabetes HFrEF 43%, ischemic cardiomyopathy s/p CRT-D, (Entresto, spironolactone 25, torsemide  20mg AVR, hypertension, paroxysmal A-fib (status post ablation 1/14/25) on amiodarone Eliquis  , CAD s/p PCI (2019), hyperlipidemia, chronic leukocytosis recent admission 5/13/25-5/25/25 who presents complaining of difficulty breathing that woke him up from sleep this afternoon associated with left sided chest pain. Describes the pain as "stabbing" in nature. Patient reports he was feeling well upon discharge home until this afternoon when symptoms began. Otherwise patient denies fever, chills, n/v/d, urinary or bowel complaints, leg pain, recent travel.     Pt on EMS arrival patient hypoxic at 78% on nonrebreather and patient started on bipap.  (02 Jun 2025 18:45)      Prior hospital charts reviewed [Yes]  Primary team notes reviewed [Yes]  Other consultant notes reviewed [Yes]    REVIEW OF SYSTEMS:  CONSTITUTIONAL: No fever or chills; + obese  HEAD: No lesion on scalp  EYES: No visual disturbance  ENT: No sore throat  RESPIRATORY: + cough, + shortness of breath  CARDIOVASCULAR: No chest pain or palpitations  GASTROINTESTINAL: No abdominal or epigastric pain  GENITOURINARY: No dysuria  NEUROLOGICAL: No headache/dizziness  MUSCULOSKELETAL: No joint pain, erythema, or swelling; no back pain  SKIN: No itching, rashes  All other ROS negative except noted above      PAST MEDICAL & SURGICAL HISTORY:  CHF (congestive heart failure)      Diabetes      CAD (coronary artery disease)      Chronic obstructive pulmonary disease (COPD)      HTN (hypertension)      Stented coronary artery      Dyslipidemia      GERD (gastroesophageal reflux disease)      Afib      CVA (cerebral vascular accident)      H/O heart artery stent      Heart valve replaced  aortic      History of Nissen fundoplication          SOCIAL HISTORY:  - No recent travel  - Denies tobacco use  - Denies alcohol use  - Denies illicit drug use    FAMILY HISTORY:  No family history of CHF    Allergies:  Bactrim (Unknown)  sulfa drugs (Unknown)      ANTIMICROBIALS:  azithromycin  IVPB 500 every 24 hours  cefTRIAXone   IVPB 1000 every 24 hours      ANTIMICROBIALS (past 90 days):  MEDICATIONS  (STANDING):  azithromycin  IVPB   250 mL/Hr IV Intermittent (06-02-25 @ 17:40)    cefTRIAXone   IVPB   100 mL/Hr IV Intermittent (06-02-25 @ 16:37)        OTHER MEDS:   MEDICATIONS  (STANDING):  albuterol/ipratropium for Nebulization 3 every 6 hours  aMIOdarone    Tablet 200 daily  apixaban 5 every 12 hours  atorvastatin 40 at bedtime  clopidogrel Tablet 75 daily  dapagliflozin 10 daily  dextrose 50% Injectable 25 once  dextrose 50% Injectable 12.5 once  dextrose 50% Injectable 25 once  dextrose Oral Gel 15 once PRN  ezetimibe 10 daily  famotidine    Tablet 40 daily  fenofibrate Tablet 145 daily  furosemide   Injectable 40 daily  glucagon  Injectable 1 once  insulin glargine Injectable (LANTUS) 34 every morning  insulin lispro (ADMELOG) corrective regimen sliding scale  three times a day before meals  melatonin 5 at bedtime  methylPREDNISolone sodium succinate Injectable 40 daily  metoprolol succinate ER 50 daily  sacubitril 24 mG/valsartan 26 mG 1 two times a day  spironolactone 25 every 24 hours  tiotropium 2.5 MICROgram(s) Inhaler 2 daily  torsemide 20 every 12 hours  zolpidem 5 at bedtime PRN      VITALS:  Vital Signs Last 24 Hrs  T(F): 97.2 (06-02-25 @ 14:42), Max: 97.8 (05-30-25 @ 11:11)    Vital Signs Last 24 Hrs  HR: 98 (06-03-25 @ 07:29) (90 - 120)  BP: 128/82 (06-03-25 @ 07:29) (126/67 - 159/72)  RR: 20 (06-03-25 @ 09:03)  SpO2: 94% (06-03-25 @ 09:03) (83% - 100%)  Wt(kg): --    EXAM:  GENERAL: NAD, lying in bed  HEAD: No head lesions  EYES: Conjunctiva pink and cornea white  EAR, NOSE, MOUTH, THROAT: Normal external ears and nose, no discharges; moist mucous membranes  NECK: Supple, nontender to palpation; no JVD  RESPIRATORY: Clear to auscultation bilaterally  CARDIOVASCULAR: S1 S2  GASTROINTESTINAL: Soft, nontender, nondistended; normoactive bowel sounds  GENITOURINARY: No iyer catheter, no CVA tenderness  EXTREMITIES: No clubbing, cyanosis, or petal edema  NERVOUS SYSTEM: Alert and oriented to person, time, place and situation, speech clear. No focal deficits   MUSCULOSKELETAL: No joint erythema, swelling or pain  SKIN: No rashes or lesions, no superficial thrombophlebitis  PSYCH: Normal affect    Labs:                        13.2   23.36 )-----------( 484      ( 02 Jun 2025 15:03 )             43.8     06-02    138  |  98  |  20  ----------------------------<  60[L]  4.3   |  21  |  1.3    Ca    9.5      02 Jun 2025 15:03    TPro  7.4  /  Alb  4.0  /  TBili  0.6  /  DBili  x   /  AST  17  /  ALT  8   /  AlkPhos  108  06-02      WBC Trend:  WBC Count: 23.36 (06-02-25 @ 15:03)      Auto Neutrophil #: 7.72 K/uL (01-14-25 @ 05:40)  Auto Neutrophil #: 12.27 K/uL (01-11-25 @ 06:13)  Auto Neutrophil #: 15.48 K/uL (01-10-25 @ 05:31)  Auto Neutrophil #: 12.68 K/uL (01-09-25 @ 05:36)  Auto Neutrophil #: 9.11 K/uL (01-08-25 @ 06:22)      Creatine Trend:  Creatinine: 1.3 (06-02)      Liver Biochemical Testing Trend:  Alanine Aminotransferase (ALT/SGPT): 8 (06-02)  Alanine Aminotransferase (ALT/SGPT): 12 (05-25)  Alanine Aminotransferase (ALT/SGPT): 10 (05-12)  Alanine Aminotransferase (ALT/SGPT): 13 (04-28)  Alanine Aminotransferase (ALT/SGPT): 9 (04-24)  Aspartate Aminotransferase (AST/SGOT): 17 (06-02-25 @ 15:03)  Aspartate Aminotransferase (AST/SGOT): 25 (05-25-25 @ 00:27)  Aspartate Aminotransferase (AST/SGOT): 12 (05-12-25 @ 23:01)  Aspartate Aminotransferase (AST/SGOT): 15 (04-28-25 @ 18:12)  Aspartate Aminotransferase (AST/SGOT): 12 (04-24-25 @ 09:48)  Bilirubin Total: 0.6 (06-02)  Bilirubin Total: 0.2 (05-25)  Bilirubin Total: 0.2 (05-12)  Bilirubin Total: 0.3 (04-28)  Bilirubin Total: 0.4 (04-24)      Trend LDH          Urinalysis Basic - ( 02 Jun 2025 15:03 )    Color: x / Appearance: x / SG: x / pH: x  Gluc: 60 mg/dL / Ketone: x  / Bili: x / Urobili: x   Blood: x / Protein: x / Nitrite: x   Leuk Esterase: x / RBC: x / WBC x   Sq Epi: x / Non Sq Epi: x / Bacteria: x          MICROBIOLOGY:    MRSA PCR Result.: Positive (04-21-25 @ 11:36)  MRSA PCR Result.: Negative (01-02-25 @ 20:30)  MRSA PCR Result.: Negative (01-02-25 @ 05:00)      HIV-1/2 Combo Result: Nonreact (01-04-25 @ 05:58)      COVID-19 PCR: NotDetec (05-15-25 @ 09:50)      Troponin T, High Sensitivity Result: 117 (06-02)    Blood Gas Venous - Lactate: 3.0 (06-02 @ 18:20)  Lactate, Blood: 2.5 (06-02 @ 17:27)  Blood Gas Venous - Lactate: 4.5 (06-02 @ 15:20)    A1C with Estimated Average Glucose Result: 9.7 % (04-19-25 @ 11:44)  A1C with Estimated Average Glucose Result: 9.7 % (03-30-25 @ 06:05)  A1C with Estimated Average Glucose Result: 9.9 % (12-28-24 @ 07:18)        RADIOLOGY:  imaging below personally reviewed    < from: Xray Chest 1 View- PORTABLE-Urgent (06.02.25 @ 15:26) >  IMPRESSION: Low lung volume.    Bilateral opacities, worse.    Left-sided permanent pacemaker.    < end of copied text >      < from: CT Chest No Cont (06.02.25 @ 20:51) >  IMPRESSION:    Large bilateral pleural effusions with lower lobe compressive atelectasis.     Patchy groundglass opacities and consolidation of the upper lobes. No   pneumothorax  Extensive coronary arteries and aortic calcifications    < end of copied text >

## 2025-06-04 DIAGNOSIS — N17.9 ACUTE KIDNEY FAILURE, UNSPECIFIED: ICD-10-CM

## 2025-06-04 DIAGNOSIS — Z71.89 OTHER SPECIFIED COUNSELING: ICD-10-CM

## 2025-06-04 DIAGNOSIS — Z51.5 ENCOUNTER FOR PALLIATIVE CARE: ICD-10-CM

## 2025-06-04 DIAGNOSIS — I50.9 HEART FAILURE, UNSPECIFIED: ICD-10-CM

## 2025-06-04 LAB
ANION GAP SERPL CALC-SCNC: 15 MMOL/L — HIGH (ref 7–14)
APPEARANCE UR: CLEAR — SIGNIFICANT CHANGE UP
BILIRUB UR-MCNC: NEGATIVE — SIGNIFICANT CHANGE UP
BUN SERPL-MCNC: 47 MG/DL — HIGH (ref 10–20)
CALCIUM SERPL-MCNC: 9.6 MG/DL — SIGNIFICANT CHANGE UP (ref 8.4–10.5)
CHLORIDE SERPL-SCNC: 99 MMOL/L — SIGNIFICANT CHANGE UP (ref 98–110)
CO2 SERPL-SCNC: 25 MMOL/L — SIGNIFICANT CHANGE UP (ref 17–32)
COLOR SPEC: YELLOW — SIGNIFICANT CHANGE UP
CREAT ?TM UR-MCNC: 17 MG/DL — SIGNIFICANT CHANGE UP
CREAT SERPL-MCNC: 1.8 MG/DL — HIGH (ref 0.7–1.5)
DIFF PNL FLD: NEGATIVE — SIGNIFICANT CHANGE UP
EGFR: 40 ML/MIN/1.73M2 — LOW
EGFR: 40 ML/MIN/1.73M2 — LOW
GLUCOSE SERPL-MCNC: 181 MG/DL — HIGH (ref 70–99)
GLUCOSE UR QL: >=1000 MG/DL
HCT VFR BLD CALC: 39.4 % — LOW (ref 42–52)
HGB BLD-MCNC: 12.3 G/DL — LOW (ref 14–18)
KETONES UR QL: NEGATIVE MG/DL — SIGNIFICANT CHANGE UP
LEUKOCYTE ESTERASE UR-ACNC: NEGATIVE — SIGNIFICANT CHANGE UP
MAGNESIUM SERPL-MCNC: 2.3 MG/DL — SIGNIFICANT CHANGE UP (ref 1.8–2.4)
MCHC RBC-ENTMCNC: 25.9 PG — LOW (ref 27–31)
MCHC RBC-ENTMCNC: 31.2 G/DL — LOW (ref 32–37)
MCV RBC AUTO: 82.9 FL — SIGNIFICANT CHANGE UP (ref 80–94)
NITRITE UR-MCNC: NEGATIVE — SIGNIFICANT CHANGE UP
NRBC BLD AUTO-RTO: 0 /100 WBCS — SIGNIFICANT CHANGE UP (ref 0–0)
PH UR: 6 — SIGNIFICANT CHANGE UP (ref 5–8)
PLATELET # BLD AUTO: 499 K/UL — HIGH (ref 130–400)
PMV BLD: 10.6 FL — HIGH (ref 7.4–10.4)
POTASSIUM SERPL-MCNC: 4.3 MMOL/L — SIGNIFICANT CHANGE UP (ref 3.5–5)
POTASSIUM SERPL-SCNC: 4.3 MMOL/L — SIGNIFICANT CHANGE UP (ref 3.5–5)
POTASSIUM UR-SCNC: 18 MMOL/L — SIGNIFICANT CHANGE UP
PROT ?TM UR-MCNC: 31 MG/DL — SIGNIFICANT CHANGE UP
PROT UR-MCNC: 30 MG/DL
PROT/CREAT UR-RTO: 1.8 RATIO — HIGH (ref 0–0.2)
RAPID RVP RESULT: DETECTED
RBC # BLD: 4.75 M/UL — SIGNIFICANT CHANGE UP (ref 4.7–6.1)
RBC # FLD: 15.3 % — HIGH (ref 11.5–14.5)
RV+EV RNA SPEC QL NAA+PROBE: DETECTED
SARS-COV-2 RNA SPEC QL NAA+PROBE: SIGNIFICANT CHANGE UP
SODIUM SERPL-SCNC: 139 MMOL/L — SIGNIFICANT CHANGE UP (ref 135–146)
SODIUM UR-SCNC: 88 MMOL/L — SIGNIFICANT CHANGE UP
SP GR SPEC: 1.01 — SIGNIFICANT CHANGE UP (ref 1–1.03)
UROBILINOGEN FLD QL: 0.2 MG/DL — SIGNIFICANT CHANGE UP (ref 0.2–1)
UUN UR-MCNC: 213 MG/DL — SIGNIFICANT CHANGE UP
WBC # BLD: 24.89 K/UL — HIGH (ref 4.8–10.8)
WBC # FLD AUTO: 24.89 K/UL — HIGH (ref 4.8–10.8)

## 2025-06-04 PROCEDURE — 76775 US EXAM ABDO BACK WALL LIM: CPT | Mod: 26

## 2025-06-04 PROCEDURE — 99233 SBSQ HOSP IP/OBS HIGH 50: CPT

## 2025-06-04 PROCEDURE — 99497 ADVNCD CARE PLAN 30 MIN: CPT | Mod: 25

## 2025-06-04 PROCEDURE — 99223 1ST HOSP IP/OBS HIGH 75: CPT

## 2025-06-04 RX ORDER — SODIUM CHLORIDE 0.65 %
1 AEROSOL, SPRAY (ML) NASAL
Refills: 0 | Status: DISCONTINUED | OUTPATIENT
Start: 2025-06-04 | End: 2025-06-06

## 2025-06-04 RX ORDER — FUROSEMIDE 10 MG/ML
40 INJECTION INTRAMUSCULAR; INTRAVENOUS DAILY
Refills: 0 | Status: DISCONTINUED | OUTPATIENT
Start: 2025-06-04 | End: 2025-06-05

## 2025-06-04 RX ADMIN — METOPROLOL SUCCINATE 50 MILLIGRAM(S): 50 TABLET, EXTENDED RELEASE ORAL at 05:30

## 2025-06-04 RX ADMIN — IPRATROPIUM BROMIDE AND ALBUTEROL SULFATE 3 MILLILITER(S): .5; 2.5 SOLUTION RESPIRATORY (INHALATION) at 07:22

## 2025-06-04 RX ADMIN — Medication 5 MILLIGRAM(S): at 21:06

## 2025-06-04 RX ADMIN — CEFPODOXIME PROXETIL 200 MILLIGRAM(S): 200 TABLET, FILM COATED ORAL at 05:30

## 2025-06-04 RX ADMIN — APIXABAN 5 MILLIGRAM(S): 2.5 TABLET, FILM COATED ORAL at 18:37

## 2025-06-04 RX ADMIN — Medication 40 MILLIGRAM(S): at 11:11

## 2025-06-04 RX ADMIN — FENOFIBRATE 145 MILLIGRAM(S): 160 TABLET ORAL at 13:40

## 2025-06-04 RX ADMIN — SACUBITRIL AND VALSARTAN 1 TABLET(S): 49; 51 TABLET, FILM COATED ORAL at 05:32

## 2025-06-04 RX ADMIN — FUROSEMIDE 40 MILLIGRAM(S): 10 INJECTION INTRAMUSCULAR; INTRAVENOUS at 12:56

## 2025-06-04 RX ADMIN — APIXABAN 5 MILLIGRAM(S): 2.5 TABLET, FILM COATED ORAL at 05:30

## 2025-06-04 RX ADMIN — INSULIN GLARGINE-YFGN 34 UNIT(S): 100 INJECTION, SOLUTION SUBCUTANEOUS at 11:08

## 2025-06-04 RX ADMIN — Medication 100 MILLIGRAM(S): at 05:29

## 2025-06-04 RX ADMIN — Medication 20 MILLIGRAM(S): at 05:30

## 2025-06-04 RX ADMIN — Medication 100 MILLIGRAM(S): at 18:34

## 2025-06-04 RX ADMIN — Medication 5 MILLIGRAM(S): at 00:01

## 2025-06-04 RX ADMIN — DEXTROMETHORPHAN HBR, GUAIFENESIN 600 MILLIGRAM(S): 200 LIQUID ORAL at 18:37

## 2025-06-04 RX ADMIN — CEFPODOXIME PROXETIL 200 MILLIGRAM(S): 200 TABLET, FILM COATED ORAL at 18:35

## 2025-06-04 RX ADMIN — PREDNISONE 40 MILLIGRAM(S): 20 TABLET ORAL at 05:30

## 2025-06-04 RX ADMIN — AMIODARONE HYDROCHLORIDE 200 MILLIGRAM(S): 50 INJECTION, SOLUTION INTRAVENOUS at 05:30

## 2025-06-04 RX ADMIN — Medication 5 MILLIGRAM(S): at 23:31

## 2025-06-04 RX ADMIN — ATORVASTATIN CALCIUM 40 MILLIGRAM(S): 80 TABLET, FILM COATED ORAL at 21:06

## 2025-06-04 RX ADMIN — CLOPIDOGREL BISULFATE 75 MILLIGRAM(S): 75 TABLET, FILM COATED ORAL at 11:10

## 2025-06-04 RX ADMIN — DEXTROMETHORPHAN HBR, GUAIFENESIN 600 MILLIGRAM(S): 200 LIQUID ORAL at 05:30

## 2025-06-04 RX ADMIN — IPRATROPIUM BROMIDE AND ALBUTEROL SULFATE 3 MILLILITER(S): .5; 2.5 SOLUTION RESPIRATORY (INHALATION) at 13:19

## 2025-06-04 RX ADMIN — EZETIMIBE 10 MILLIGRAM(S): 10 TABLET ORAL at 13:04

## 2025-06-04 NOTE — CONSULT NOTE ADULT - ASSESSMENT
70 year old male with past medical history of  COPD (on 2L home O2), diabetes HFrEF 43%, ischemic cardiomyopathy s/p CRT-D, (Entresto, spironolactone 25, torsemide  20mg AVR, hypertension, paroxysmal A-fib (status post ablation 1/14/25) on amiodarone Eliquis  , CAD s/p PCI (2019), hyperlipidemia, chronic leukocytosis recent admission 5/13/25-5/25/25 who presents complaining of difficulty breathing and chest pain.  Patient with acute on chronic respiratory failure in the setting of pulmonary edema and HFrEF exacerbation and KAREN. Palliative care consulted for GOC as patient has reportedly been noncompliant with medications.    Spoke with patient at bedside. Palliative care introduced. He was able to provide a medical history and hospital course. He noted his pain has improved and he doesn't have pain currently. We discussed his hospital course and GOC.  We discussed code status and he confirmed he wishes to be DNR/DNI.  We discussed GOC otherwise, and he noted he wants ongoing aggressive medical management and interventions. All questions answered.    Plan  -DNR/DNI, trial  -new MOLST filled out and placed in chart as previous MOLST did not indicate who made decision about code status  -ongoing medical management  -continue IV diuresis with lasix  -monitor creatinine, UOP, and lytes intensively given KAREN  -follow up renal consult  -will sign off    Education about palliative care provided to patient/family.  Discussed with Dr. Francisco     Please call [phone] with questions or concerns 24/7.

## 2025-06-04 NOTE — CHART NOTE - NSCHARTNOTEFT_GEN_A_CORE
[facility] [id number]      Isolation precautions removed as per Infection Control.  Covid Negative  RSV - positive for Rhinovirus only.

## 2025-06-04 NOTE — CONSULT NOTE ADULT - SUBJECTIVE AND OBJECTIVE BOX
CC: SOB    HPI:  Patient is a  70 year old male with past medical history of  COPD (on 2L home O2), diabetes HFrEF 43%, ischemic cardiomyopathy s/p CRT-D, (Entresto, spironolactone 25, torsemide  20mg AVR, hypertension, paroxysmal A-fib (status post ablation 1/14/25) on amiodarone Eliquis  , CAD s/p PCI (2019), hyperlipidemia, chronic leukocytosis recent admission 5/13/25-5/25/25 who presents complaining of difficulty breathing that woke him up from sleep this afternoon associated with left sided chest pain. Describes the pain as "stabbing" in nature. Patient reports he was feeling well upon discharge home until this afternoon when symptoms began. Otherwise patient denies fever, chills, n/v/d, urinary or bowel complaints, leg pain, recent travel.     Pt on EMS arrival patient hypoxic at 78% on nonrebreather and patient started on bipap.  (02 Jun 2025 18:45)    PERTINENT PM/SXH:   CHF (congestive heart failure)    Diabetes    CAD (coronary artery disease)    Chronic obstructive pulmonary disease (COPD)    HTN (hypertension)    Stented coronary artery    Dyslipidemia    GERD (gastroesophageal reflux disease)    Afib    CVA (cerebral vascular accident)      H/O heart artery stent    Heart valve replaced    History of Nissen fundoplication      FAMILY HISTORY:    None pertinent    ITEMS NOT CHECKED ARE NOT PRESENT    SOCIAL HISTORY:   Significant other/partner[ ]  Children[ ]  Sabianism/Spirituality:  Substance hx:  [ ]   Tobacco hx:  [ ]   Alcohol hx: [ ]   Living Situation: [ x]Home  [ ]Long term care  [ ]Rehab [ ]Other  Home Services: [ ] HHA [ ] Visting RN [ ] Hospice  Occupation:  Home Opioid hx:  [ ] Y [ ] N [ x] I-Stop Reference No:    Reference #: [id number]    Patient Demographic Information (PDI)       PDI	First Name	Last Name	Birth Date	Gender	Street Address	[facility]	Zip Code  BRYANT Frey	1955	Male	[address]	[address]    Prescription Information      PDI Filter:    PDI	Current Rx	Drug Type	Rx Written	Rx Dispensed	Drug	Quantity	Days Supply	Prescriber Name	Prescriber ERYN #	Payment Method  A	N		05/01/2025	05/01/2025	eszopiclone 3 mg tablet	30	30	Martin Shaver DO	[id number]	Medicare  Dispenser [facility] [id number]  A	N		03/03/2025	03/19/2025	eszopiclone 3 mg tablet	30	30	BeylinsonMartin DO	[id number]	Medicare  Dispenser [facility] [id number]  A	N		02/11/2025	02/13/2025	eszopiclone 3 mg tablet	30	30	Beylinson, Martin JARAMILLO	[id number]	[facility] [id number]  A	N		07/08/2024	12/12/2024	eszopiclone 3 mg tablet	30	30	Beylinson, Martin JARAMILLO	[id number]	[facility] [id number]  A	N		08/20/2024	11/12/2024	eszopiclone 3 mg tablet	30	30	Beylinson, Martin JARAMILLO	[id number]	Medicare  Dispenser [place] [id number]  A	N		08/20/2024	10/09/2024	eszopiclone 3 mg tablet	30	30	BeylinsonMartin DO	[id number]	[facility] [id number]  A	N		08/20/2024	09/07/2024	eszopiclone 3 mg tablet	30	30	BeylinsonMartin DO	[id number]	[facility] [id number]  A	N		07/08/2024	08/08/2024	eszopiclone 3 mg tablet	30	30	Beylinson, Martin JARAMILLO	[id number]	[facility] [id number]  A	N		07/08/2024	07/08/2024	eszopiclone 3 mg tablet	30	30	BeylinsonMartin DO	[id number]	[facility]ic[facility]pens[facility] [id number]  A	N		01/08/2024	06/07/2024	eszopiclone 3 mg tablet	30	30	Beylinson, Martin JARAMILLO	[id number]	[place]  [place] [id number]    * - Details of Drug Type : O = Opioid, B = Benzodiazepine, S = Stimulant     ADVANCE DIRECTIVES:     [ ] Full Code [ ] DNR  MOLST  [ ]  Living Will  [ ]   DECISION MAKER(s):  [ ] Health Care Proxy(s)  [ ] Surrogate(s)  [ ] Guardian           Name(s): Phone Number(s):      BASELINE (I)ADL(s) (prior to admission):    [place]: [ ]Total  [ ] Moderate [ ]Dependent  Palliative Performance Status Version 2:         %    http://[facility].org/files/news/palliative_performance_scale_ppsv2.pdf    Allergies    Bactrim (Unknown)  sulfa drugs (Unknown)    Intolerances    MEDICATIONS  (STANDING):  albuterol/ipratropium for Nebulization 3 milliLiter(s) Nebulizer every 6 hours  aMIOdarone    Tablet 200 milliGRAM(s) Oral daily  apixaban 5 milliGRAM(s) Oral every 12 hours  atorvastatin 40 milliGRAM(s) Oral at bedtime  cefpodoxime 200 milliGRAM(s) Oral every 12 hours  chlorhexidine 2% Cloths 1 Application(s) Topical <User Schedule>  clopidogrel Tablet 75 milliGRAM(s) Oral daily  dapagliflozin 10 milliGRAM(s) Oral daily  dextrose 5%. 1000 milliLiter(s) (100 mL/Hr) IV Continuous <Continuous>  dextrose 5%. 1000 milliLiter(s) (50 mL/Hr) IV Continuous <Continuous>  dextrose 50% Injectable 25 Gram(s) IV Push once  dextrose 50% Injectable 12.5 Gram(s) IV Push once  dextrose 50% Injectable 25 Gram(s) IV Push once  doxycycline monohydrate Capsule 100 milliGRAM(s) Oral every 12 hours  ezetimibe 10 milliGRAM(s) Oral daily  famotidine    Tablet 40 milliGRAM(s) Oral daily  fenofibrate Tablet 145 milliGRAM(s) Oral daily  glucagon  Injectable 1 milliGRAM(s) IntraMuscular once  guaiFENesin  milliGRAM(s) Oral every 12 hours  insulin glargine Injectable (LANTUS) 34 Unit(s) SubCutaneous every morning  insulin lispro (ADMELOG) corrective regimen sliding scale   SubCutaneous three times a day before meals  melatonin 5 milliGRAM(s) Oral at bedtime  metoprolol succinate ER 50 milliGRAM(s) Oral daily  predniSONE   Tablet 40 milliGRAM(s) Oral daily  sacubitril 24 mG/valsartan 26 mG 1 Tablet(s) Oral two times a day  spironolactone 25 milliGRAM(s) Oral every 24 hours  tiotropium 2.5 MICROgram(s) Inhaler 2 Puff(s) Inhalation daily  torsemide 20 milliGRAM(s) Oral every 12 hours    MEDICATIONS  (PRN):  dextrose Oral Gel 15 Gram(s) Oral once PRN Blood Glucose LESS THAN 70 milliGRAM(s)/deciliter  sodium chloride 0.65% Nasal 1 Spray(s) Both Nostrils every 3 hours PRN Nasal Congestion  zolpidem 5 milliGRAM(s) Oral at bedtime PRN Insomnia    PRESENT SYMPTOMS: [ ]Unable to obtain due to poor mentation   Source if other than patient:  [ ]Family   [ ]Team     Pain: [ ]yes [ ]no  ALL PAIN ASSESSMENT COMPONENTS WERE ASKED ABOUT UNLESS OTHERWISE NOTED  QOL impact -   Location -                    Aggravating factors -  Quality -  Radiation -  Timing-  Severity (0-10 scale):  Minimal acceptable level (0-10 scale):     CPOT:    https://www.[facility].org/getattachment/[email or web address]/Critical-Care-Pain-Observation-Tool-(CPOT)    PAIN AD Score:   http://geriatrictoolkit.[facility]/cog/painad.pdf (press ctrl +  left click to view)    Dyspnea:                           [ ]None[ ]Mild [ ]Moderate [ ]Severe     Respiratory Distress Observation Scale (RDOS):   A score of 0 to 2 signifies little or no respiratory distress, 3 signifies mild distress, scores 4 to 6 indicate moderate distress, and scores greater than 7 signify severe distress  https://www.[facility].ca/sites/default/files/PDFS/761883-edqvberjbit-zfkkyicp-wgvbimhiqis-iplce.pdf    Anxiety:                             [ ]None[ ]Mild [ ]Moderate [ ]Severe   Fatigue:                             [ ]None[ ]Mild [ ]Moderate [ ]Severe   Nausea:                             [ ]None[ ]Mild [ ]Moderate [ ]Severe   Loss of appetite:              [ ]None[ ]Mild [ ]Moderate [ ]Severe   Constipation:                    [ ]None[ ]Mild [ ]Moderate [ ]Severe    Other Symptoms:  [ ]All other review of systems negative     Palliative Performance Status Version 2:         %    http://npcrc.org/files/news/palliative_performance_scale_ppsv2.pdf    PHYSICAL EXAM:  Vital Signs Last 24 Hrs  T(C): 36.3 (04 Jun 2025 04:15), Max: 36.9 (03 Jun 2025 14:33)  T(F): 97.4 (04 Jun 2025 04:15), Max: 98.4 (03 Jun 2025 14:33)  HR: 93 (04 Jun 2025 04:15) (52 - 102)  BP: 148/87 (04 Jun 2025 04:15) (108/55 - 148/87)  BP(mean): --  RR: 18 (04 Jun 2025 08:29) (18 - 34)  SpO2: 93% (04 Jun 2025 08:29) (93% - 99%)    Parameters below as of 04 Jun 2025 08:29  Patient On (Oxygen Delivery Method): nasal cannula  O2 Flow (L/min): 4   I&O's Summary      GENERAL:  [ ] No acute distress [ ]Lethargic  [ ]Unarousable  [ ]Verbal  [ ]Non-Verbal [ ]Cachexia    BEHAVIORAL/PSYCH:  [ ]Alert and Oriented x  [ ] Anxiety [ ] Delirium [ ] Agitation [ ] Calm   EYES: [ ] No scleral icterus [ ] Scleral icterus [ ] Closed  ENMT:  [ ]Dry mouth  [ ]No external oral lesions [ ] No external ear or nose lesions  CARDIOVASCULAR:  [ ]Regular [ ]Irregular [ ]Tachy [ ]Not Tachy  [ ]Aubrey [ ] Edema [ ] No edema  PULMONARY:  [ ]Tachypnea  [ ]Audible excessive secretions [ ] No labored breathing [ ] labored breathing  GASTROINTESTINAL: [ ]Soft  [ ]Distended  [ ]Not distended [ ]Non tender [ ]Tender  MUSCULOSKELETAL: [ ]No clubbing [ ] clubbing  [ ] No cyanosis [ ] cyanosis  NEUROLOGIC: [ ]No focal deficits  [ ]Follows commands  [ ]Does not follow commands  [ ]Cognitive impairment  [ ]Dysphagia  [ ]Dysarthria  [ ]Paresis   SKIN: [ ] Jaundiced [ ] Non-jaundiced [ ]Rash [ ]No Rash [ ] Warm [ ] Dry  MISC/LINES: [ ] ET tube [ ] Trach [ ]NGT/OGT [ ]PEG [ ]Mcmahon    LABS: Interpreted by me                        12.3   24.89 )-----------( 499      ( 04 Jun 2025 08:31 )             39.4   06-04    139  |  99  |  47[H]  ----------------------------<  181[H]  4.3   |  25  |  1.8[H]    Ca    9.6      04 Jun 2025 08:31  Mg     2.3     06-04    TPro  7.4  /  Alb  4.0  /  TBili  0.6  /  DBili  x   /  AST  17  /  ALT  8   /  AlkPhos  108  06-02      Urinalysis Basic - ( 04 Jun 2025 08:31 )    Color: x / Appearance: x / SG: x / pH: x  Gluc: 181 mg/dL / Ketone: x  / Bili: x / Urobili: x   Blood: x / Protein: x / Nitrite: x   Leuk Esterase: x / RBC: x / WBC x   Sq Epi: x / Non Sq Epi: x / Bacteria: x      RADIOLOGY & ADDITIONAL STUDIES: Interpreted by me    EKG: Interpreted by me      PROTEIN CALORIE MALNUTRITION PRESENT: [ ]mild [ ]moderate [ ]severe [ ]underweight [ ]morbid obesity  https://www.andeal.org/vault/2440/web/files/ONC/Table_Clinical%20Characteristics%20to%20Document%20Malnutrition-White%20JV%20et%20al%202012.pdf    Height (cm): 162.6 (06-03-25 @ 14:33), 160 (05-30-25 @ 11:11), 160 (05-26-25 @ 12:09)  Weight (kg): 75.9 (06-03-25 @ 14:33), 75.7 (05-30-25 @ 11:11), 77.1 (05-26-25 @ 12:09)  BMI (kg/m2): 28.7 (06-03-25 @ 14:33), 29.6 (05-30-25 @ 11:11), 30.1 (05-26-25 @ 12:09)  [ ]PPSV2 < or = to 30% [ ]significant weight loss  [ ]poor nutritional intake  [ ]anasarca      [ ]Artificial Nutrition      Palliative Care Spiritual/Emotional Screening Tool Question  Severity (0-4):                    OR                    [ x] Unable to determine. Will assess at later time if appropriate.  Score of 2 or greater indicates recommendation of Chaplaincy and/or SW referral  Chaplaincy Referral: [ ] Yes [ ] Refused [ ] Following     Caregiver [place]:  [ ] Yes [ ] No    OR    [x ] Unable to determine. Will assess at later time if appropriate.  Social Work Referral [ ]  Patient and Family Centered Care Referral [ ]    Anticipatory Grief Present: [ ] Yes [ ] No    OR     [ x] Unable to determine. Will assess at later time if appropriate.  Social Work Referral [ ]  Patient and Family Centered Care Referral [ ]    Patient discussed with primary medical team MD  Palliative care education provided to patient and/or family   CC: SOB    HPI:  Patient is a  70 year old male with past medical history of  COPD (on 2L home O2), diabetes HFrEF 43%, ischemic cardiomyopathy s/p CRT-D, (Entresto, spironolactone 25, torsemide  20mg AVR, hypertension, paroxysmal A-fib (status post ablation 1/14/25) on amiodarone Eliquis  , CAD s/p PCI (2019), hyperlipidemia, chronic leukocytosis recent admission 5/13/25-5/25/25 who presents complaining of difficulty breathing that woke him up from sleep this afternoon associated with left sided chest pain. Describes the pain as "stabbing" in nature. Patient reports he was feeling well upon discharge home until this afternoon when symptoms began. Otherwise patient denies fever, chills, n/v/d, urinary or bowel complaints, leg pain, recent travel.     Pt on EMS arrival patient hypoxic at 78% on nonrebreather and patient started on bipap.  (02 Jun 2025 18:45)    PERTINENT PM/SXH:   CHF (congestive heart failure)    Diabetes    CAD (coronary artery disease)    Chronic obstructive pulmonary disease (COPD)    HTN (hypertension)    Stented coronary artery    Dyslipidemia    GERD (gastroesophageal reflux disease)    Afib    CVA (cerebral vascular accident)      H/O heart artery stent    Heart valve replaced    History of Nissen fundoplication      FAMILY HISTORY:    None pertinent    ITEMS NOT CHECKED ARE NOT PRESENT    SOCIAL HISTORY:   Significant other/partner[ ]  Children[ ]  Adventist/Spirituality:  Substance hx:  [ ]   Tobacco hx:  [ ]   Alcohol hx: [ ]   Living Situation: [ x]Home  [ ]Long term care  [ ]Rehab [ ]Other  Home Services: [ ] HHA [ ] Visting RN [ ] Hospice  Occupation:  Home Opioid hx:  [ ] Y [ ] N [ x] I-Stop Reference No:    Reference #: [id number]    Patient Demographic Information (PDI)       PDI	First Name	Last Name	Birth Date	Gender	Street Address	[facility]	Zip Code  BRYANT Frey	1955	Male	[address]	[address]    Prescription Information      PDI Filter:    PDI	Current Rx	Drug Type	Rx Written	Rx Dispensed	Drug	Quantity	Days Supply	Prescriber Name	Prescriber ERYN #	Payment Method  A	N		05/01/2025	05/01/2025	eszopiclone 3 mg tablet	30	30	Martin Shaver DO	[id number]	Medicare  Dispenser [facility] [id number]  A	N		03/03/2025	03/19/2025	eszopiclone 3 mg tablet	30	30	BeylinsonMartin DO	[id number]	Medicare  Dispenser [facility] [id number]  A	N		02/11/2025	02/13/2025	eszopiclone 3 mg tablet	30	30	Beylinson, Martin JARAMILLO	[id number]	[facility] [place] [id number]  A	N		07/08/2024	12/12/2024	eszopiclone 3 mg tablet	30	30	Beylinson, Martin JARAMILLO	[id number]	[facility] [address]  A	N		08/20/2024	11/12/2024	eszopiclone 3 mg tablet	30	30	Beylinson, Martin JARAMILLO	[id number]	[facility] [id number]  A	N		08/20/2024	10/09/2024	eszopiclone 3 mg tablet	30	30	BeylinsonMartin DO	[id number]	[facility] [id number]  A	N		08/20/2024	09/07/2024	eszopiclone 3 mg tablet	30	30	Beylinson, Martin JARAMILLO	[id number]	[facility]ic[facility] [place] [id number]  A	N		07/08/2024	08/08/2024	eszopiclone 3 mg tablet	30	30	Beylinson, Martin JARAMILLO	[id number]	[facility] [id number]  A	N		07/08/2024	07/08/2024	eszopiclone 3 mg tablet	30	30	BeylinsonMartin DO	[id number]	Medicare  Dispenser [facility] [id number]  A	N		01/08/2024	06/07/2024	eszopiclone 3 mg tablet	30	30	Beylinson, Martin JARAMILLO	[id number]	[facility] [place] [id number]    * - Details of Drug Type : O = Opioid, B = Benzodiazepine, S = Stimulant     ADVANCE DIRECTIVES:     [ ] Full Code [ x] DNR  MOLST  [ ]  Living Will  [ ]   DECISION MAKER(s):  [ ] Health Care Proxy(s)  [x ] Surrogate(s)  [ ] Guardian           Name(s): Phone Number(s):  Spouse Valencia Mandelcommaria ines      BASELINE (I)ADL(s) (prior to admission):    [place]: [ ]Total  [ ] Moderate [ ]Dependent  Palliative Performance Status Version 2:         %    http://[facility].org/files/news/palliative_performance_scale_[email or web address].pdf    Allergies    Bactrim (Unknown)  sulfa drugs (Unknown)    Intolerances    MEDICATIONS  (STANDING):  albuterol/ipratropium for Nebulization 3 milliLiter(s) Nebulizer every 6 hours  aMIOdarone    Tablet 200 milliGRAM(s) Oral daily  apixaban 5 milliGRAM(s) Oral every 12 hours  atorvastatin 40 milliGRAM(s) Oral at bedtime  cefpodoxime 200 milliGRAM(s) Oral every 12 hours  chlorhexidine 2% Cloths 1 Application(s) Topical <User Schedule>  clopidogrel Tablet 75 milliGRAM(s) Oral daily  dapagliflozin 10 milliGRAM(s) Oral daily  dextrose 5%. 1000 milliLiter(s) (100 mL/Hr) IV Continuous <Continuous>  dextrose 5%. 1000 milliLiter(s) (50 mL/Hr) IV Continuous <Continuous>  dextrose 50% Injectable 25 Gram(s) IV Push once  dextrose 50% Injectable 12.5 Gram(s) IV Push once  dextrose 50% Injectable 25 Gram(s) IV Push once  doxycycline monohydrate Capsule 100 milliGRAM(s) Oral every 12 hours  ezetimibe 10 milliGRAM(s) Oral daily  famotidine    Tablet 40 milliGRAM(s) Oral daily  fenofibrate Tablet 145 milliGRAM(s) Oral daily  glucagon  Injectable 1 milliGRAM(s) IntraMuscular once  guaiFENesin  milliGRAM(s) Oral every 12 hours  insulin glargine Injectable (LANTUS) 34 Unit(s) SubCutaneous every morning  insulin lispro (ADMELOG) corrective regimen sliding scale   SubCutaneous three times a day before meals  melatonin 5 milliGRAM(s) Oral at bedtime  metoprolol succinate ER 50 milliGRAM(s) Oral daily  predniSONE   Tablet 40 milliGRAM(s) Oral daily  sacubitril 24 mG/valsartan 26 mG 1 Tablet(s) Oral two times a day  spironolactone 25 milliGRAM(s) Oral every 24 hours  tiotropium 2.5 MICROgram(s) Inhaler 2 Puff(s) Inhalation daily  torsemide 20 milliGRAM(s) Oral every 12 hours    MEDICATIONS  (PRN):  dextrose Oral Gel 15 Gram(s) Oral once PRN Blood Glucose LESS THAN 70 milliGRAM(s)/deciliter  sodium chloride 0.65% Nasal 1 Spray(s) Both Nostrils every 3 hours PRN Nasal Congestion  zolpidem 5 milliGRAM(s) Oral at bedtime PRN Insomnia    PRESENT SYMPTOMS: [ ]Unable to obtain due to poor mentation   Source if other than patient:  [ ]Family   [ ]Team     Pain: [ ]yes [x ]no  ALL PAIN ASSESSMENT COMPONENTS WERE ASKED ABOUT UNLESS OTHERWISE NOTED  QOL impact -   Location -                    Aggravating factors -  Quality -  Radiation -  Timing-  Severity (0-10 scale):  Minimal acceptable level (0-10 scale):     CPOT:    https://www.[facility].org/getattachment/[email or web address]/Critical-Care-Pain-Observation-Tool-(CPOT)    PAIN AD Score:   http://geriatrictoolkit.[facility]/cog/painad.pdf (press ctrl +  left click to view)    Dyspnea:                           [x ]None[ ]Mild [ ]Moderate [ ]Severe     Respiratory Distress Observation Scale (RDOS):   A score of 0 to 2 signifies little or no respiratory distress, 3 signifies mild distress, scores 4 to 6 indicate moderate distress, and scores greater than 7 signify severe distress  https://www.[facility].ca/sites/default/files/PDFS/935190-smjalzawvez-pitvahgt-rveyrbqbxwr-afcjl.pdf    Anxiety:                             [ ]None[ ]Mild [ ]Moderate [ ]Severe   Fatigue:                             [ ]None[ ]Mild [ ]Moderate [ ]Severe   Nausea:                             [x ]None[ ]Mild [ ]Moderate [ ]Severe   Loss of appetite:              [ ]None[ ]Mild [ ]Moderate [ ]Severe   Constipation:                    [ ]None[ ]Mild [ ]Moderate [ ]Severe    Other Symptoms:  [x ]All other review of systems negative     Palliative Performance Status Version 2:         30%    http://npcrc.org/files/news/palliative_performance_scale_ppsv2.pdf    PHYSICAL EXAM:  Vital Signs Last 24 Hrs  T(C): 36.3 (04 Jun 2025 04:15), Max: 36.9 (03 Jun 2025 14:33)  T(F): 97.4 (04 Jun 2025 04:15), Max: 98.4 (03 Jun 2025 14:33)  HR: 93 (04 Jun 2025 04:15) (52 - 102)  BP: 148/87 (04 Jun 2025 04:15) (108/55 - 148/87)  BP(mean): --  RR: 18 (04 Jun 2025 08:29) (18 - 34)  SpO2: 93% (04 Jun 2025 08:29) (93% - 99%)    Parameters below as of 04 Jun 2025 08:29  Patient On (Oxygen Delivery Method): nasal cannula  O2 Flow (L/min): 4   I&O's Summary      GENERAL:  [ ] No acute distress [ ]Lethargic  [ ]Unarousable  [ x]Verbal  [ ]Non-Verbal [ ]Cachexia    BEHAVIORAL/PSYCH:  [ x]Alert and Oriented x3  [ ] Anxiety [ ] Delirium [ ] Agitation [ ] Calm   EYES: [ ] No scleral icterus [ ] Scleral icterus [ ] Closed  ENMT:  [ ]Dry mouth  [x ]No external oral lesions [ ] No external ear or nose lesions  CARDIOVASCULAR:  [ ]Regular [ ]Irregular [ ]Tachy [ ]Not Tachy  [ ]Aubrey [ ] Edema [ ] No edema  PULMONARY:  [ ]Tachypnea  [ ]Audible excessive secretions [x ] No labored breathing [ ] labored breathing  GASTROINTESTINAL: [ ]Soft  [ ]Distended  [ ]Not distended [ ]Non tender [ ]Tender  MUSCULOSKELETAL: [ ]No clubbing [ ] clubbing  [ ] No cyanosis [ ] cyanosis  NEUROLOGIC: [ ]No focal deficits  [x ]Follows commands  [ ]Does not follow commands  [ ]Cognitive impairment  [ ]Dysphagia  [ ]Dysarthria  [ ]Paresis   SKIN: [ ] Jaundiced [x ] Non-jaundiced [ ]Rash [ ]No Rash [ ] Warm [ ] Dry  MISC/LINES: [ ] ET tube [ ] Trach [ ]NGT/OGT [ ]PEG [ ]Mcmahon    LABS: Interpreted by me - BMP shows elevated creatinine to 1.8, CBC shows leukocytosis, LFTs grossly WNL                        12.3   24.89 )-----------( 499      ( 04 Jun 2025 08:31 )             39.4   06-04    139  |  99  |  47[H]  ----------------------------<  181[H]  4.3   |  25  |  1.8[H]    Ca    9.6      04 Jun 2025 08:31  Mg     2.3     06-04    TPro  7.4  /  Alb  4.0  /  TBili  0.6  /  DBili  x   /  AST  17  /  ALT  8   /  AlkPhos  108  06-02      Urinalysis Basic - ( 04 Jun 2025 08:31 )    Color: x / Appearance: x / SG: x / pH: x  Gluc: 181 mg/dL / Ketone: x  / Bili: x / Urobili: x   Blood: x / Protein: x / Nitrite: x   Leuk Esterase: x / RBC: x / WBC x   Sq Epi: x / Non Sq Epi: x / Bacteria: x      RADIOLOGY & ADDITIONAL STUDIES: Interpreted by me    < from: CT Head No Cont (06.03.25 @ 18:15) >  IMPRESSION:  No evidence of acute transcortical infarct, acute intracranial   hemorrhage, or mass effect.    < end of copied text >      EKG: Interpreted by me    < from: 12 Lead ECG (06.02.25 @ 14:52) >  Ventricular Rate 116 BPM    Atrial Rate 54 BPM    QRS Duration 128 ms    Q-T Interval 348 ms    QTC Calculation(Bazett) 483 ms    R Axis -64 degrees    T Axis 103 degrees    Diagnosis Line    Atrial fibrillation with rapid ventricular response withpremature ventricular  or aberrantly conducted complexes  ventricular-paced complexes  Left axis deviation  Nonspecific T wave abnormality , probably digitalis effect  Abnormal ECG    < end of copied text >      PROTEIN CALORIE MALNUTRITION PRESENT: [ ]mild [ ]moderate [ ]severe [ ]underweight [ ]morbid obesity  https://www.andeal.org/vault/2440/web/files/ONC/Table_Clinical%20Characteristics%20to%20Document%20Malnutrition-White%20JV%20et%20al%202012.pdf    Height (cm): 162.6 (06-03-25 @ 14:33), 160 (05-30-25 @ 11:11), 160 (05-26-25 @ 12:09)  Weight (kg): 75.9 (06-03-25 @ 14:33), 75.7 (05-30-25 @ 11:11), 77.1 (05-26-25 @ 12:09)  BMI (kg/m2): 28.7 (06-03-25 @ 14:33), 29.6 (05-30-25 @ 11:11), 30.1 (05-26-25 @ 12:09)  [ ]PPSV2 < or = to 30% [ ]significant weight loss  [ ]poor nutritional intake  [ ]anasarca      [ ]Artificial Nutrition      Palliative Care Spiritual/Emotional Screening Tool Question  Severity (0-4):                    OR                    [ x] Unable to determine. Will assess at later time if appropriate.  Score of 2 or greater indicates recommendation of Chaplaincy and/or SW referral  Chaplaincy Referral: [ ] Yes [ ] Refused [ ] Following     Caregiver [place]:  [ ] Yes [ ] No    OR    [x ] Unable to determine. Will assess at later time if appropriate.  Social Work Referral [ ]  Patient and Family Centered Care Referral [ ]    Anticipatory Grief Present: [ ] Yes [ ] No    OR     [ x] Unable to determine. Will assess at later time if appropriate.  Social Work Referral [ ]  Patient and Family Centered Care Referral [ ]    Patient discussed with primary medical team MD  Palliative care education provided to patient and/or family

## 2025-06-04 NOTE — PROGRESS NOTE ADULT - ASSESSMENT
Patient is a  70 year old male with past medical history of  COPD (on 2L home O2), diabetes HFrEF 43%, ischemic cardiomyopathy s/p CRT-D, (Entresto, spironolactone 25, torsemide  20mg AVR, hypertension, paroxysmal A-fib (status post ablation 1/14/25) on amiodarone Eliquis, CAD s/p PCI (2019), hyperlipidemia, chronic leukocytosis recent admission 5/13/25-5/25/25 who presents complaining of difficulty breathing that woke him up from sleep this afternoon associated with left sided chest pain.   Pt on EMS arrival patient hypoxic at 78% on nonrebreather and patient started on bipap.   Nitroglycerin started 2/2 to elevated BP.  Now off nitro and bipap.      Plan:    # Acute on chronic hypoxic respiratory failure 2/2 to pulmonary edema/ HFrEF exacerbation flash pulmonary edema   #Entorovirus+   #Hx of COPD, possible component of COPD with CO2 retention, requiring bipap   - Monitor WBC and fever curve  - Follow up blood culture cultures, strep, legionella, MRSA, sputum culture  - Continue with empiric abx broad spectrum because of recent hospitalization - pt plling out IV, changed to po   - Chest x-ray: Bilateral opacities, worse. Left-sided permanent pacemaker.  - Last TTE that was done on 4/20/25 shows an EF of 30%, moderate pulmonary HTN, severely decreased systolic function.    - pBNP 2963  - daily weights  - Strict Is and Os  - Fluid restriction  -now back on home dose O2  - Pulmonology consult-diuresis, quickly wean steroids   -cardio consult-lasix 40 IV daily while inapteint and switch to po torsemide when more euvolemic   -discussed ABX with ID on 6/3, on po regimen  -6/4: this morning pt endorsing feeling terrible and sob, o2 requirements stable, now agreeable for IV placement and will start IV lasix 40 daily as significant effusions on CT and crackles diffusely. RVP now also +enterovirus-supportive care     #KAREN, suspect cardiorenal   -Send urine studies  -check renal/bladder US  -did not receive contrast, no ATN  -start IV lasix as above  -for now hold farxiga, entresto, and aldactone, restart when karen resolves   -Will get nephrology eval as complicated patient   -strict INs/outs, monitor UO     #noncompliance  -pt is noncompliant with meds at home and also here, pulling out IVs and refusing meds  -palliaitve care consult pending     # Chronic leukocytosis  -monitor CBC, slightly higher  then usual, c/w po abx for now     # DM type 2   - ISS  - Hold other diabetic meds used at home       #CAD with stents,   - Elevated troponin - suspect demand ischemia  - Cardiology apprecaited       # A Fib (PAF)   - DOAC, amiodarone, metoprolol     # Hypertension   - Monitor on current meds    # GERD   - On pepcid at home    # History of CVA - noted     # Dyslipidemia - continue usual meds     CHG ordered  GI ppx: PPI    dvt ppx - doac    Pending: Start IV lasix as very symptomatic this morning and now agreeable for IV, monitor KAREN and KAREN w/u with nephro eval, short course po abx and prednisone, possible 48

## 2025-06-04 NOTE — PROGRESS NOTE ADULT - SUBJECTIVE AND OBJECTIVE BOX
MONIQUE LYONS 70y Male  MRN#: [id number]     Hospital Day: 2d      SUBJECTIVE  No acute events overnight, pt seen and examined this morning, now agreeable for IV as sob and wants iv diuretics                                           ----------------------------------------------------------  OBJECTIVE  PAST MEDICAL & SURGICAL HISTORY  CHF (congestive heart failure)    Diabetes    CAD (coronary artery disease)    Chronic obstructive pulmonary disease (COPD)    HTN (hypertension)    Stented coronary artery    Dyslipidemia    GERD (gastroesophageal reflux disease)    Afib    CVA (cerebral vascular accident)    H/O heart artery stent    Heart valve replaced  aortic    History of Nissen fundoplication                                              -----------------------------------------------------------  ALLERGIES:  Bactrim (Unknown)  sulfa drugs (Unknown)                                            ------------------------------------------------------------    HOME MEDICATIONS  Home Medications:  atorvastatin 40 mg oral tablet: 1 tab(s) orally once a day (25 May 2025 03:34)  clopidogrel 75 mg oral tablet: 1 tab(s) orally once a day (25 May 2025 03:34)  eszopiclone 3 mg oral tablet: 1 tab(s) orally once a day (at bedtime) (25 May 2025 03:37)  ezetimibe 10 mg oral tablet: 1 orally once a day (25 May 2025 03:37)  famotidine 40 mg oral tablet: 1 tab(s) orally once a day (25 May 2025 03:37)  fenofibrate 145 mg oral tablet: 1 orally once a day (25 May 2025 03:37)  melatonin 5 mg oral tablet: 1 tab(s) orally once a day (at bedtime) (25 May 2025 03:37)  metFORMIN 500 mg oral tablet: 1 tab(s) orally 2 times a day (25 May 2025 03:37)  metoprolol succinate 50 mg oral tablet, extended release: 1 tab(s) orally once a day (25 May 2025 03:37)  Soaanz 20 mg oral tablet: 1 tab(s) orally every 12 hours (25 May 2025 04:26)  spironolactone 25 mg oral tablet: 1 tab(s) orally every 24 hours (25 May 2025 04:25)  Tresiba 100 units/mL subcutaneous solution: 34 unit(s) subcutaneous once a day (in the morning) (25 May 2025 04:25)                           MEDICATIONS:  STANDING MEDICATIONS  albuterol/ipratropium for Nebulization 3 milliLiter(s) Nebulizer every 6 hours  aMIOdarone    Tablet 200 milliGRAM(s) Oral daily  apixaban 5 milliGRAM(s) Oral every 12 hours  atorvastatin 40 milliGRAM(s) Oral at bedtime  cefpodoxime 200 milliGRAM(s) Oral every 12 hours  chlorhexidine 2% Cloths 1 Application(s) Topical <User Schedule>  clopidogrel Tablet 75 milliGRAM(s) Oral daily  dextrose 5%. 1000 milliLiter(s) IV Continuous <Continuous>  dextrose 5%. 1000 milliLiter(s) IV Continuous <Continuous>  dextrose 50% Injectable 25 Gram(s) IV Push once  dextrose 50% Injectable 12.5 Gram(s) IV Push once  dextrose 50% Injectable 25 Gram(s) IV Push once  doxycycline monohydrate Capsule 100 milliGRAM(s) Oral every 12 hours  ezetimibe 10 milliGRAM(s) Oral daily  famotidine    Tablet 40 milliGRAM(s) Oral daily  fenofibrate Tablet 145 milliGRAM(s) Oral daily  furosemide   Injectable 40 milliGRAM(s) IV Push daily  glucagon  Injectable 1 milliGRAM(s) IntraMuscular once  guaiFENesin  milliGRAM(s) Oral every 12 hours  insulin glargine Injectable (LANTUS) 34 Unit(s) SubCutaneous every morning  insulin lispro (ADMELOG) corrective regimen sliding scale   SubCutaneous three times a day before meals  melatonin 5 milliGRAM(s) Oral at bedtime  metoprolol succinate ER 50 milliGRAM(s) Oral daily  predniSONE   Tablet 40 milliGRAM(s) Oral daily  tiotropium 2.5 MICROgram(s) Inhaler 2 Puff(s) Inhalation daily    PRN MEDICATIONS  dextrose Oral Gel 15 Gram(s) Oral once PRN  sodium chloride 0.65% Nasal 1 Spray(s) Both Nostrils every 3 hours PRN  zolpidem 5 milliGRAM(s) Oral at bedtime PRN                                            ------------------------------------------------------------  VITAL SIGNS: Last 24 Hours  T(C): 36.3 (04 Jun 2025 04:15), Max: 36.9 (03 Jun 2025 14:33)  T(F): 97.4 (04 Jun 2025 04:15), Max: 98.4 (03 Jun 2025 14:33)  HR: 93 (04 Jun 2025 04:15) (52 - 102)  BP: 148/87 (04 Jun 2025 04:15) (108/55 - 148/87)  BP(mean): --  RR: 18 (04 Jun 2025 08:29) (18 - 18)  SpO2: 93% (04 Jun 2025 08:29) (93% - 99%)                                             --------------------------------------------------------------  LABS:                        12.3   24.89 )-----------( 499      ( 04 Jun 2025 08:31 )             39.4     06-04    139  |  99  |  47[H]  ----------------------------<  181[H]  4.3   |  25  |  1.8[H]    Ca    9.6      04 Jun 2025 08:31  Mg     2.3     06-04    TPro  7.4  /  Alb  4.0  /  TBili  0.6  /  DBili  x   /  AST  17  /  ALT  8   /  AlkPhos  108  06-02      Urinalysis Basic - ( 04 Jun 2025 08:31 )    Color: x / Appearance: x / SG: x / pH: x  Gluc: 181 mg/dL / Ketone: x  / Bili: x / Urobili: x   Blood: x / Protein: x / Nitrite: x   Leuk Esterase: x / RBC: x / WBC x   Sq Epi: x / Non Sq Epi: x / Bacteria: x      ABG - ( 03 Jun 2025 18:37 )  pH, Arterial: 7.52  pH, Blood: x     /  pCO2: 25    /  pO2: 120   / HCO3: 20    / Base Excess: -0.9  /  SaO2: 99.1                    Culture - Blood (collected 02 Jun 2025 18:05)  Source: Blood Blood  Preliminary Report (04 Jun 2025 02:02):    No growth at 24 hours    Culture - Blood (collected 02 Jun 2025 18:05)  Source: Blood Blood  Preliminary Report (04 Jun 2025 02:02):    No growth at 24 hours                                                    -------------------------------------------------------------  RADIOLOGY:  CXR  Xray Chest 1 View AP/PA:   ACC: [id number] EXAM:  XR CHEST 1 VIEW   ORDERED BY: MAGO BALDWIN     PROCEDURE DATE:  06/03/2025          INTERPRETATION:  CLINICAL HISTORY: Pulmonary edema and shortness of   breath.    COMPARISON: Chest radiograph 6/2/2025.    TECHNIQUE: Frontal chest radiograph.    FINDINGS:    Support devices: Left chest pacemaker.    Cardiac/mediastinum/hilum: Unchanged.    Lung parenchyma/Pleura: Essentially stable bilateral opacities. Likely   right pleural effusion. No pneumothorax.    Skeleton/soft tissues: Unchanged.      IMPRESSION:    Essentially stable bilateral opacities.    Likely right pleural effusion which wasn't seen on prior exam.    --- End of Report ---            OTIS KATE MD; Attending Radiologist  This document has been electronically signed. Kleber  3 2025  2:29PM (06-03-25 @ 13:34)  Xray Chest 1 View- PORTABLE-Urgent:   ACC: [id number] EXAM:  XR CHEST PORTABLE URGENT 1V   ORDERED BY: RADHA VICTORIA     PROCEDURE DATE:  06/02/2025          INTERPRETATION:  CLINICAL HISTORY: Shortness of breath.    COMPARISON: May 30, 2025.    TECHNIQUE: Portable frontal chest radiograph. Low lung volume.    FINDINGS:    Support devices: Left-sided permanent pacemaker.    Cardiac/mediastinum/hilum: Stable.    Lung parenchyma/Pleura: Bilateral opacities, worse. No pneumothorax is   seen.    Skeleton/soft tissues: Stable.      IMPRESSION: Low lung volume.    Bilateral opacities, worse.    Left-sided permanent pacemaker.    --- End of Report ---            AISLINN BERTRAND MD; Attending Radiologist  This document has been electronically signed. Jun 2 2025  3:50PM (06-02-25 @ 15:26)      CT  CT Chest No Cont:   ACC: [id number] EXAM:  CT CHEST   ORDERED BY: ABENA NAYLOR     PROCEDURE DATE:  06/02/2025          INTERPRETATION:  Indication::Worsening pneumonia. Rule out effusion.      Shoulder joint are was performed from lung apices to adrenal glands.  Sagittal and coronal reformatted images were generated.    Comparison: CT chest-3/28/2025  Chest x-ray 6/2/2025      Findings:    Tubes/Lines/Devices : Left ICD.  Mediastinum/hilum: .No adenopathy or masses.  Chest Wall/ Breasts: battery pack left chestwall. No adenopathy.   Heart/Great Vessels:Poststernotomy. Cardiomegaly. No pericardial   effusions. Extensive coronary artery and aortic calcifications.    Abdomen: Visualized upper abdominal organs are unremarkable.  Bones and soft tissues: Degenerative changes thoracic spine  Lungs, Pleura, and Airways: Patent central airways. Large bilateral   pleural effusions with lower lobe compressive atelectasis. Patchy   groundglass opacities and consolidation of the upper lobes. No   pneumothorax  Pulmonary nodules:  No suspicious pulmonary nodules.  IMPRESSION:    Large bilateral pleural effusions with lower lobe compressive atelectasis.     Patchy groundglass opacities and consolidation of the upper lobes. No   pneumothorax  Extensive coronary arteries and aortic calcifications    --- End of Report ---            JAZLYN LO MD; Attending Radiologist  This document has been electronically signed. Kleber  3 2025 12:44PM (06-02-25 @ 20:51)                                            --------------------------------------------------------------    PHYSICAL EXAM:  GENERAL: lying in bed   CHEST/LUNG: decreased breath sounds, +crackles   HEART: Regular rate and rhythm; No murmurs, rubs, or gallops  ABDOMEN: Soft, nontender, nondistended  EXTREMITIES:  No clubbing, cyanosis, or edema

## 2025-06-04 NOTE — CONSULT NOTE ADULT - CONVERSATION DETAILS
Spoke with patient at bedside. Palliative care introduced. He was able to provide a medical history and hospital course. He noted his pain has improved and he doesn't have pain currently. We discussed his hospital course and GOC.  We discussed code status and he confirmed he wishes to be DNR/DNI.  We discussed GOC otherwise, and he noted he wants ongoing aggressive medical management and interventions. All questions answered.

## 2025-06-05 LAB
ALBUMIN SERPL ELPH-MCNC: 3.9 G/DL — SIGNIFICANT CHANGE UP (ref 3.5–5.2)
ALP SERPL-CCNC: 108 U/L — SIGNIFICANT CHANGE UP (ref 30–115)
ALT FLD-CCNC: 17 U/L — SIGNIFICANT CHANGE UP (ref 0–41)
ANION GAP SERPL CALC-SCNC: 17 MMOL/L — HIGH (ref 7–14)
AST SERPL-CCNC: 18 U/L — SIGNIFICANT CHANGE UP (ref 0–41)
BASOPHILS # BLD AUTO: 0.05 K/UL — SIGNIFICANT CHANGE UP (ref 0–0.2)
BASOPHILS NFR BLD AUTO: 0.2 % — SIGNIFICANT CHANGE UP (ref 0–1)
BILIRUB SERPL-MCNC: 0.5 MG/DL — SIGNIFICANT CHANGE UP (ref 0.2–1.2)
BUN SERPL-MCNC: 49 MG/DL — HIGH (ref 10–20)
CALCIUM SERPL-MCNC: 9.7 MG/DL — SIGNIFICANT CHANGE UP (ref 8.4–10.5)
CHLORIDE SERPL-SCNC: 99 MMOL/L — SIGNIFICANT CHANGE UP (ref 98–110)
CO2 SERPL-SCNC: 27 MMOL/L — SIGNIFICANT CHANGE UP (ref 17–32)
CREAT SERPL-MCNC: 1.6 MG/DL — HIGH (ref 0.7–1.5)
EGFR: 46 ML/MIN/1.73M2 — LOW
EGFR: 46 ML/MIN/1.73M2 — LOW
EOSINOPHIL # BLD AUTO: 0 K/UL — SIGNIFICANT CHANGE UP (ref 0–0.7)
EOSINOPHIL NFR BLD AUTO: 0 % — SIGNIFICANT CHANGE UP (ref 0–8)
FLUAV AG NPH QL: SIGNIFICANT CHANGE UP
FLUBV AG NPH QL: SIGNIFICANT CHANGE UP
GAS PNL BLDA: SIGNIFICANT CHANGE UP
GLUCOSE SERPL-MCNC: 137 MG/DL — HIGH (ref 70–99)
HCT VFR BLD CALC: 43.6 % — SIGNIFICANT CHANGE UP (ref 42–52)
HGB BLD-MCNC: 13.8 G/DL — LOW (ref 14–18)
IMM GRANULOCYTES NFR BLD AUTO: 0.7 % — HIGH (ref 0.1–0.3)
LYMPHOCYTES # BLD AUTO: 0.94 K/UL — LOW (ref 1.2–3.4)
LYMPHOCYTES # BLD AUTO: 4.1 % — LOW (ref 20.5–51.1)
MAGNESIUM SERPL-MCNC: 2.5 MG/DL — HIGH (ref 1.8–2.4)
MCHC RBC-ENTMCNC: 25.9 PG — LOW (ref 27–31)
MCHC RBC-ENTMCNC: 31.7 G/DL — LOW (ref 32–37)
MCV RBC AUTO: 82 FL — SIGNIFICANT CHANGE UP (ref 80–94)
MONOCYTES # BLD AUTO: 1.21 K/UL — HIGH (ref 0.1–0.6)
MONOCYTES NFR BLD AUTO: 5.3 % — SIGNIFICANT CHANGE UP (ref 1.7–9.3)
NEUTROPHILS # BLD AUTO: 20.4 K/UL — HIGH (ref 1.4–6.5)
NEUTROPHILS NFR BLD AUTO: 89.7 % — HIGH (ref 42.2–75.2)
NRBC BLD AUTO-RTO: 0 /100 WBCS — SIGNIFICANT CHANGE UP (ref 0–0)
OSMOLALITY UR: 335 MOS/KG — SIGNIFICANT CHANGE UP (ref 50–1200)
PLATELET # BLD AUTO: 509 K/UL — HIGH (ref 130–400)
PMV BLD: 10.6 FL — HIGH (ref 7.4–10.4)
POTASSIUM SERPL-MCNC: 3.9 MMOL/L — SIGNIFICANT CHANGE UP (ref 3.5–5)
POTASSIUM SERPL-SCNC: 3.9 MMOL/L — SIGNIFICANT CHANGE UP (ref 3.5–5)
PROT SERPL-MCNC: 7.1 G/DL — SIGNIFICANT CHANGE UP (ref 6–8)
RBC # BLD: 5.32 M/UL — SIGNIFICANT CHANGE UP (ref 4.7–6.1)
RBC # FLD: 15.4 % — HIGH (ref 11.5–14.5)
RSV RNA NPH QL NAA+NON-PROBE: SIGNIFICANT CHANGE UP
SARS-COV-2 RNA SPEC QL NAA+PROBE: SIGNIFICANT CHANGE UP
SODIUM SERPL-SCNC: 143 MMOL/L — SIGNIFICANT CHANGE UP (ref 135–146)
SOURCE RESPIRATORY: SIGNIFICANT CHANGE UP
WBC # BLD: 22.75 K/UL — HIGH (ref 4.8–10.8)
WBC # FLD AUTO: 22.75 K/UL — HIGH (ref 4.8–10.8)

## 2025-06-05 PROCEDURE — 99233 SBSQ HOSP IP/OBS HIGH 50: CPT

## 2025-06-05 PROCEDURE — 71045 X-RAY EXAM CHEST 1 VIEW: CPT | Mod: 26

## 2025-06-05 RX ORDER — TORSEMIDE 10 MG
20 TABLET ORAL DAILY
Refills: 0 | Status: DISCONTINUED | OUTPATIENT
Start: 2025-06-06 | End: 2025-06-06

## 2025-06-05 RX ORDER — HALOPERIDOL 10 MG/1
3 TABLET ORAL ONCE
Refills: 0 | Status: COMPLETED | OUTPATIENT
Start: 2025-06-05 | End: 2025-06-05

## 2025-06-05 RX ORDER — BACITRACIN 500 UNIT/G
1 OINTMENT (GRAM) TOPICAL
Refills: 0 | Status: DISCONTINUED | OUTPATIENT
Start: 2025-06-05 | End: 2025-06-06

## 2025-06-05 RX ORDER — METOPROLOL SUCCINATE 50 MG/1
75 TABLET, EXTENDED RELEASE ORAL DAILY
Refills: 0 | Status: DISCONTINUED | OUTPATIENT
Start: 2025-06-05 | End: 2025-06-06

## 2025-06-05 RX ADMIN — CLOPIDOGREL BISULFATE 75 MILLIGRAM(S): 75 TABLET, FILM COATED ORAL at 11:50

## 2025-06-05 RX ADMIN — Medication 100 MILLIGRAM(S): at 05:07

## 2025-06-05 RX ADMIN — Medication 50 MILLIEQUIVALENT(S): at 10:15

## 2025-06-05 RX ADMIN — CEFPODOXIME PROXETIL 200 MILLIGRAM(S): 200 TABLET, FILM COATED ORAL at 17:33

## 2025-06-05 RX ADMIN — Medication 40 MILLIGRAM(S): at 11:51

## 2025-06-05 RX ADMIN — DEXTROMETHORPHAN HBR, GUAIFENESIN 600 MILLIGRAM(S): 200 LIQUID ORAL at 05:07

## 2025-06-05 RX ADMIN — EZETIMIBE 10 MILLIGRAM(S): 10 TABLET ORAL at 11:51

## 2025-06-05 RX ADMIN — Medication 1 APPLICATION(S): at 05:05

## 2025-06-05 RX ADMIN — Medication 100 MILLIGRAM(S): at 17:32

## 2025-06-05 RX ADMIN — IPRATROPIUM BROMIDE AND ALBUTEROL SULFATE 3 MILLILITER(S): .5; 2.5 SOLUTION RESPIRATORY (INHALATION) at 14:18

## 2025-06-05 RX ADMIN — Medication 5 MILLIGRAM(S): at 21:27

## 2025-06-05 RX ADMIN — FENOFIBRATE 145 MILLIGRAM(S): 160 TABLET ORAL at 11:51

## 2025-06-05 RX ADMIN — METOPROLOL SUCCINATE 75 MILLIGRAM(S): 50 TABLET, EXTENDED RELEASE ORAL at 21:28

## 2025-06-05 RX ADMIN — IPRATROPIUM BROMIDE AND ALBUTEROL SULFATE 3 MILLILITER(S): .5; 2.5 SOLUTION RESPIRATORY (INHALATION) at 09:45

## 2025-06-05 RX ADMIN — INSULIN LISPRO 8: 100 INJECTION, SOLUTION INTRAVENOUS; SUBCUTANEOUS at 17:00

## 2025-06-05 RX ADMIN — PREDNISONE 40 MILLIGRAM(S): 20 TABLET ORAL at 05:06

## 2025-06-05 RX ADMIN — APIXABAN 5 MILLIGRAM(S): 2.5 TABLET, FILM COATED ORAL at 05:09

## 2025-06-05 RX ADMIN — METOPROLOL SUCCINATE 50 MILLIGRAM(S): 50 TABLET, EXTENDED RELEASE ORAL at 05:06

## 2025-06-05 RX ADMIN — APIXABAN 5 MILLIGRAM(S): 2.5 TABLET, FILM COATED ORAL at 17:32

## 2025-06-05 RX ADMIN — DEXTROMETHORPHAN HBR, GUAIFENESIN 600 MILLIGRAM(S): 200 LIQUID ORAL at 17:31

## 2025-06-05 RX ADMIN — INSULIN GLARGINE-YFGN 34 UNIT(S): 100 INJECTION, SOLUTION SUBCUTANEOUS at 08:29

## 2025-06-05 RX ADMIN — IPRATROPIUM BROMIDE AND ALBUTEROL SULFATE 3 MILLILITER(S): .5; 2.5 SOLUTION RESPIRATORY (INHALATION) at 20:39

## 2025-06-05 RX ADMIN — Medication 50 MILLIEQUIVALENT(S): at 11:51

## 2025-06-05 RX ADMIN — FUROSEMIDE 40 MILLIGRAM(S): 10 INJECTION INTRAMUSCULAR; INTRAVENOUS at 05:06

## 2025-06-05 RX ADMIN — INSULIN LISPRO 2: 100 INJECTION, SOLUTION INTRAVENOUS; SUBCUTANEOUS at 11:55

## 2025-06-05 RX ADMIN — HALOPERIDOL 3 MILLIGRAM(S): 10 TABLET ORAL at 01:19

## 2025-06-05 RX ADMIN — Medication 1 APPLICATION(S): at 17:31

## 2025-06-05 RX ADMIN — CEFPODOXIME PROXETIL 200 MILLIGRAM(S): 200 TABLET, FILM COATED ORAL at 05:07

## 2025-06-05 RX ADMIN — AMIODARONE HYDROCHLORIDE 200 MILLIGRAM(S): 50 INJECTION, SOLUTION INTRAVENOUS at 05:06

## 2025-06-05 RX ADMIN — ATORVASTATIN CALCIUM 40 MILLIGRAM(S): 80 TABLET, FILM COATED ORAL at 21:28

## 2025-06-05 NOTE — CONSULT NOTE ADULT - SUBJECTIVE AND OBJECTIVE BOX
NEPHROLOGY CONSULTATION NOTE    MONIQUE LYONS  70y  Male  MRN-[id number]    CC:   Patient is a 70y old  Male who presents with a chief complaint of SOB (05 Jun 2025 10:24)      HPI:  Patient is a  70 year old male with past medical history of  COPD (on 2L home O2), diabetes HFrEF 43%, ischemic cardiomyopathy s/p CRT-D, (Entresto, spironolactone 25, torsemide  20mg AVR, hypertension, paroxysmal A-fib (status post ablation 1/14/25) on amiodarone Eliquis  , CAD s/p PCI (2019), hyperlipidemia, chronic leukocytosis recent admission 5/13/25-5/25/25 who presents complaining of difficulty breathing that woke him up from sleep this afternoon associated with left sided chest pain. Describes the pain as "stabbing" in nature. Patient reports he was feeling well upon discharge home until this afternoon when symptoms began. Otherwise patient denies fever, chills, n/v/d, urinary or bowel complaints, leg pain, recent travel.     Pt on EMS arrival patient hypoxic at 78% on nonrebreather and patient started on bipap.  (02 Jun 2025 18:45)      PAST MEDICAL & SURGICAL HISTORY:  CHF (congestive heart failure)      Diabetes      CAD (coronary artery disease)      Chronic obstructive pulmonary disease (COPD)      HTN (hypertension)      Stented coronary artery      Dyslipidemia      GERD (gastroesophageal reflux disease)      Afib      CVA (cerebral vascular accident)      H/O heart artery stent      Heart valve replaced  aortic      History of Nissen fundoplication        Allergies:  Bactrim (Unknown)  sulfa drugs (Unknown)    Home Medications Reviewed  Hospital Medications:   MEDICATIONS  (STANDING):  albuterol/ipratropium for Nebulization 3 milliLiter(s) Nebulizer every 6 hours  aMIOdarone    Tablet 200 milliGRAM(s) Oral daily  apixaban 5 milliGRAM(s) Oral every 12 hours  atorvastatin 40 milliGRAM(s) Oral at bedtime  bacitracin   Ointment 1 Application(s) Topical two times a day  cefpodoxime 200 milliGRAM(s) Oral every 12 hours  chlorhexidine 2% Cloths 1 Application(s) Topical <User Schedule>  clopidogrel Tablet 75 milliGRAM(s) Oral daily  doxycycline monohydrate Capsule 100 milliGRAM(s) Oral every 12 hours  ezetimibe 10 milliGRAM(s) Oral daily  famotidine    Tablet 40 milliGRAM(s) Oral daily  fenofibrate Tablet 145 milliGRAM(s) Oral daily  guaiFENesin  milliGRAM(s) Oral every 12 hours  insulin glargine Injectable (LANTUS) 34 Unit(s) SubCutaneous every morning  insulin lispro (ADMELOG) corrective regimen sliding scale   SubCutaneous three times a day before meals  melatonin 5 milliGRAM(s) Oral at bedtime  metoprolol succinate ER 50 milliGRAM(s) Oral daily  predniSONE   Tablet 40 milliGRAM(s) Oral daily  tiotropium 2.5 MICROgram(s) Inhaler 2 Puff(s) Inhalation daily    MEDICATIONS  (PRN):  dextrose Oral Gel 15 Gram(s) Oral once PRN Blood Glucose LESS THAN 70 milliGRAM(s)/deciliter  sodium chloride 0.65% Nasal 1 Spray(s) Both Nostrils every 3 hours PRN Nasal Congestion  zolpidem 5 milliGRAM(s) Oral at bedtime PRN Insomnia    Home Medications:  atorvastatin 40 mg oral tablet: 1 tab(s) orally once a day (25 May 2025 03:34)  clopidogrel 75 mg oral tablet: 1 tab(s) orally once a day (25 May 2025 03:34)  eszopiclone 3 mg oral tablet: 1 tab(s) orally once a day (at bedtime) (25 May 2025 03:37)  ezetimibe 10 mg oral tablet: 1 orally once a day (25 May 2025 03:37)  famotidine 40 mg oral tablet: 1 tab(s) orally once a day (25 May 2025 03:37)  fenofibrate 145 mg oral tablet: 1 orally once a day (25 May 2025 03:37)  melatonin 5 mg oral tablet: 1 tab(s) orally once a day (at bedtime) (25 May 2025 03:37)  metFORMIN 500 mg oral tablet: 1 tab(s) orally 2 times a day (25 May 2025 03:37)  metoprolol succinate 50 mg oral tablet, extended release: 1 tab(s) orally once a day (25 May 2025 03:37)  Soaanz 20 mg oral tablet: 1 tab(s) orally every 12 hours (25 May 2025 04:26)  spironolactone 25 mg oral tablet: 1 tab(s) orally every 24 hours (25 May 2025 04:25)  Tresiba 100 units/mL subcutaneous solution: 34 unit(s) subcutaneous once a day (in the morning) (25 May 2025 04:25)      SOCIAL HISTORY:  Social History:  Substance Use (street drugs): ( x ) never used  (  ) other:  Tobacco Usage: (X ) former smoker   Alcohol Usage: None (02 Jun 2025 18:45)      FAMILY HISTORY:      REVIEW OF SYSTEMS:  All other review of systems is negative unless indicated above.    VITALS:  T(F): 97.5 (06-05-25 @ 04:58), Max: 97.6 (06-04-25 @ 13:56)  HR: 114 (06-05-25 @ 04:58)  BP: 156/82 (06-05-25 @ 04:58)  RR: 18 (06-05-25 @ 04:58)  SpO2: 95% (06-05-25 @ 04:58)      I&O's Detail    04 Jun 2025 07:01  -  05 Jun 2025 07:00  --------------------------------------------------------  IN:  Total IN: 0 mL    OUT:    Voided (mL): 450 mL  Total OUT: 450 mL    Total NET: -450 mL            I&O's Summary    04 Jun 2025 07:01  -  05 Jun 2025 07:00  --------------------------------------------------------  IN: 0 mL / OUT: 450 mL / NET: -450 mL        PHYSICAL EXAM:  Gen: ill appearing  resp: b/l breath sounds  abd: soft  ext: mild edema    LABS:  ABG - ( 05 Jun 2025 10:26 )  pH, Arterial: 7.49  pH, Blood: x     /  pCO2: 43    /  pO2: 64    / HCO3: 33    / Base Excess: 8.5   /  SaO2: 90.5        06-05    143  |  99  |  49[H]  ----------------------------<  137[H]  3.9   |  27  |  1.6[H]    Ca    9.7      05 Jun 2025 07:40  Mg     2.5     06-05    TPro  7.1  /  Alb  3.9  /  TBili  0.5  /  DBili      /  AST  18  /  ALT  17  /  AlkPhos  108  06-05    Creatinine Trend:   Creatinine: 1.6 mg/dL (06-05-25 @ 07:40)  Creatinine: 1.8 mg/dL (06-04-25 @ 08:31)  Creatinine: 1.3 mg/dL (06-02-25 @ 15:03)  Creatinine: 1.3 mg/dL (05-25-25 @ 04:30)  Creatinine: 1.3 mg/dL (05-25-25 @ 00:27)                          13.8   22.75 )-----------( 509      ( 05 Jun 2025 07:40 )             43.6     Mean Cell Volume: 82.0 fL (06-05-25 @ 07:40)    Urine Studies:  Protein, Urine: 30 mg/dL (06-04-25 @ 11:30)  White Blood Cell - Urine: 2 /HPF (06-04-25 @ 11:30)  Red Blood Cell - Urine: 2 /HPF (06-04-25 @ 11:30)    Sodium, Random Urine: 88.0 mmoL/L (06-04 @ 11:30)  Creatinine, Random Urine: 17 mg/dL (06-04 @ 11:30)            RADIOLOGY & ADDITIONAL STUDIES:        US Renal:   ACC: [id number] EXAM:  US KIDNEY(S)   ORDERED BY: ABHINAV GARCIA     PROCEDURE DATE:  06/04/2025          INTERPRETATION:  CLINICAL INFORMATION: twan.    COMPARISON: CT dated 1/3/2025    TECHNIQUE: Sonography of the kidneys.    FINDINGS:  Right kidney: 11.2 cm. No hydronephrosis.    Left kidney: 10.4 cm. No hydronephrosis.    IMPRESSION:  No hydronephrosis.        --- End of Report ---            REY HAYDEN MD; Attending Radiologist  This document has been electronically signed. Jun 4 2025 12:32PM (06-04-25 @ 12:25)                     NEPHROLOGY CONSULTATION NOTE    MONIQUE LYONS  70y  Male  MRN-[id number]    CC:   Patient is a 70y old  Male who presents with a chief complaint of SOB (05 Jun 2025 10:24)      HPI:  Patient is a  70 year old male with past medical history of  COPD (on 2L home O2), diabetes HFrEF 43%, ischemic cardiomyopathy s/p CRT-D, (Entresto, spironolactone 25, torsemide  20mg AVR, hypertension, paroxysmal A-fib (status post ablation 1/14/25) on amiodarone Eliquis  , CAD s/p PCI (2019), hyperlipidemia, chronic leukocytosis recent admission 5/13/25-5/25/25 who presents complaining of difficulty breathing that woke him up from sleep this afternoon associated with left sided chest pain. Describes the pain as "stabbing" in nature. Patient reports he was feeling well upon discharge home until this afternoon when symptoms began. Otherwise patient denies fever, chills, n/v/d, urinary or bowel complaints, leg pain, recent travel.     Pt on EMS arrival patient hypoxic at 78% on nonrebreather and patient started on bipap.  (02 Jun 2025 18:45)      PAST MEDICAL & SURGICAL HISTORY:  CHF (congestive heart failure)      Diabetes      CAD (coronary artery disease)      Chronic obstructive pulmonary disease (COPD)      HTN (hypertension)      Stented coronary artery      Dyslipidemia      GERD (gastroesophageal reflux disease)      Afib      CVA (cerebral vascular accident)      H/O heart artery stent      Heart valve replaced  aortic      History of Nissen fundoplication        Allergies:  Bactrim (Unknown)  sulfa drugs (Unknown)    Home Medications Reviewed  Hospital Medications:   MEDICATIONS  (STANDING):  albuterol/ipratropium for Nebulization 3 milliLiter(s) Nebulizer every 6 hours  aMIOdarone    Tablet 200 milliGRAM(s) Oral daily  apixaban 5 milliGRAM(s) Oral every 12 hours  atorvastatin 40 milliGRAM(s) Oral at bedtime  bacitracin   Ointment 1 Application(s) Topical two times a day  cefpodoxime 200 milliGRAM(s) Oral every 12 hours  chlorhexidine 2% Cloths 1 Application(s) Topical <User Schedule>  clopidogrel Tablet 75 milliGRAM(s) Oral daily  doxycycline monohydrate Capsule 100 milliGRAM(s) Oral every 12 hours  ezetimibe 10 milliGRAM(s) Oral daily  famotidine    Tablet 40 milliGRAM(s) Oral daily  fenofibrate Tablet 145 milliGRAM(s) Oral daily  guaiFENesin  milliGRAM(s) Oral every 12 hours  insulin glargine Injectable (LANTUS) 34 Unit(s) SubCutaneous every morning  insulin lispro (ADMELOG) corrective regimen sliding scale   SubCutaneous three times a day before meals  melatonin 5 milliGRAM(s) Oral at bedtime  metoprolol succinate ER 50 milliGRAM(s) Oral daily  predniSONE   Tablet 40 milliGRAM(s) Oral daily  tiotropium 2.5 MICROgram(s) Inhaler 2 Puff(s) Inhalation daily    MEDICATIONS  (PRN):  dextrose Oral Gel 15 Gram(s) Oral once PRN Blood Glucose LESS THAN 70 milliGRAM(s)/deciliter  sodium chloride 0.65% Nasal 1 Spray(s) Both Nostrils every 3 hours PRN Nasal Congestion  zolpidem 5 milliGRAM(s) Oral at bedtime PRN Insomnia    Home Medications:  atorvastatin 40 mg oral tablet: 1 tab(s) orally once a day (25 May 2025 03:34)  clopidogrel 75 mg oral tablet: 1 tab(s) orally once a day (25 May 2025 03:34)  eszopiclone 3 mg oral tablet: 1 tab(s) orally once a day (at bedtime) (25 May 2025 03:37)  ezetimibe 10 mg oral tablet: 1 orally once a day (25 May 2025 03:37)  famotidine 40 mg oral tablet: 1 tab(s) orally once a day (25 May 2025 03:37)  fenofibrate 145 mg oral tablet: 1 orally once a day (25 May 2025 03:37)  melatonin 5 mg oral tablet: 1 tab(s) orally once a day (at bedtime) (25 May 2025 03:37)  metFORMIN 500 mg oral tablet: 1 tab(s) orally 2 times a day (25 May 2025 03:37)  metoprolol succinate 50 mg oral tablet, extended release: 1 tab(s) orally once a day (25 May 2025 03:37)  Soaanz 20 mg oral tablet: 1 tab(s) orally every 12 hours (25 May 2025 04:26)  spironolactone 25 mg oral tablet: 1 tab(s) orally every 24 hours (25 May 2025 04:25)  Tresiba 100 units/mL subcutaneous solution: 34 unit(s) subcutaneous once a day (in the morning) (25 May 2025 04:25)      SOCIAL HISTORY:  Social History:  Substance Use (street drugs): ( x ) never used  (  ) other:  Tobacco Usage: (X ) former smoker   Alcohol Usage: None (02 Jun 2025 18:45)      FAMILY HISTORY:      REVIEW OF SYSTEMS:  All other review of systems is negative unless indicated above.    VITALS:  T(F): 97.5 (06-05-25 @ 04:58), Max: 97.6 (06-04-25 @ 13:56)  HR: 114 (06-05-25 @ 04:58)  BP: 156/82 (06-05-25 @ 04:58)  RR: 18 (06-05-25 @ 04:58)  SpO2: 95% (06-05-25 @ 04:58)      I&O's Detail    04 Jun 2025 07:01  -  05 Jun 2025 07:00  --------------------------------------------------------  IN:  Total IN: 0 mL    OUT:    Voided (mL): 450 mL  Total OUT: 450 mL    Total NET: -450 mL            I&O's Summary    04 Jun 2025 07:01  -  05 Jun 2025 07:00  --------------------------------------------------------  IN: 0 mL / OUT: 450 mL / NET: -450 mL        PHYSICAL EXAM:  Gen: ill appearing  resp: b/l breath sounds/wheezing  abd: soft  ext: min edema    LABS:  ABG - ( 05 Jun 2025 10:26 )  pH, Arterial: 7.49  pH, Blood: x     /  pCO2: 43    /  pO2: 64    / HCO3: 33    / Base Excess: 8.5   /  SaO2: 90.5        06-05    143  |  99  |  49[H]  ----------------------------<  137[H]  3.9   |  27  |  1.6[H]    Ca    9.7      05 Jun 2025 07:40  Mg     2.5     06-05    TPro  7.1  /  Alb  3.9  /  TBili  0.5  /  DBili      /  AST  18  /  ALT  17  /  AlkPhos  108  06-05    Creatinine Trend:   Creatinine: 1.6 mg/dL (06-05-25 @ 07:40)  Creatinine: 1.8 mg/dL (06-04-25 @ 08:31)  Creatinine: 1.3 mg/dL (06-02-25 @ 15:03)  Creatinine: 1.3 mg/dL (05-25-25 @ 04:30)  Creatinine: 1.3 mg/dL (05-25-25 @ 00:27)                          13.8   22.75 )-----------( 509      ( 05 Jun 2025 07:40 )             43.6     Mean Cell Volume: 82.0 fL (06-05-25 @ 07:40)    Urine Studies:  Protein, Urine: 30 mg/dL (06-04-25 @ 11:30)  White Blood Cell - Urine: 2 /HPF (06-04-25 @ 11:30)  Red Blood Cell - Urine: 2 /HPF (06-04-25 @ 11:30)    Sodium, Random Urine: 88.0 mmoL/L (06-04 @ 11:30)  Creatinine, Random Urine: 17 mg/dL (06-04 @ 11:30)            RADIOLOGY & ADDITIONAL STUDIES:        US Renal:   ACC: [id number] EXAM:  US KIDNEY(S)   ORDERED BY: ABHINAV GARCIA     PROCEDURE DATE:  06/04/2025          INTERPRETATION:  CLINICAL INFORMATION: twan.    COMPARISON: CT dated 1/3/2025    TECHNIQUE: Sonography of the kidneys.    FINDINGS:  Right kidney: 11.2 cm. No hydronephrosis.    Left kidney: 10.4 cm. No hydronephrosis.    IMPRESSION:  No hydronephrosis.        --- End of Report ---            REY HAYDEN MD; Attending Radiologist  This document has been electronically signed. Jun 4 2025 12:32PM (06-04-25 @ 12:25)        < from: TTE Echo Complete w/o Contrast w/ Doppler (04.20.25 @ 13:14) >  Summary:   1. LV Ejection Fraction by Beck's Method with a biplane EF of 30 %.   2. Severely decreased global left ventricular systolic function.   3. Endocardial visualization was enhanced with intravenous echo contrast.   4. Left atrial enlargement.   5. Mildly reduced RV systolic function.   6. Mild mitral valve regurgitation.   7. Thickening of the anterior and posterior mitral valve leaflets.   8. Moderate tricuspid regurgitation.   9. Bioprosthetic aortic valve in place. Peak/mean hssypkre86.9/15.3   mmHg.  10. Estimated pulmonary artery systolic pressure is 59.9 mmHg assuming a   right atrial pressure of 3 mmHg, which is consistent with moderate   pulmonary hypertension.  11. Likely PFO noted by color doppler wtih left to right shunt.    < end of copied text >

## 2025-06-05 NOTE — PHYSICAL THERAPY INITIAL EVALUATION ADULT - PERTINENT HX OF CURRENT PROBLEM, REHAB EVAL
70 year old male with past medical history of  COPD (on 2L home O2), diabetes HFrEF 43%, ischemic cardiomyopathy s/p CRT-D, (Entresto, spironolactone 25, torsemide  20mg AVR, hypertension, paroxysmal A-fib (status post ablation 1/14/25) on amiodarone Eliquis  , CAD s/p PCI (2019), hyperlipidemia, chronic leukocytosis recent admission 5/13/25-5/25/25 who presents complaining of difficulty breathing that woke him up from sleep this afternoon associated with left sided chest pain. Describes the pain as "stabbing" in nature. Patient reports he was feeling well upon discharge home until this afternoon when symptoms began. Otherwise patient denies fever, chills, n/v/d, urinary or bowel complaints, leg pain, recent travel.     Pt on EMS arrival patient hypoxic at 78% on nonrebreather and patient started on bipap.

## 2025-06-05 NOTE — PHYSICAL THERAPY INITIAL EVALUATION ADULT - GENERAL OBSERVATIONS, REHAB EVAL
15:10 - 15:40 Chart reviewed. Order received.  Patient is ok to be  seen for PT,confirmed with RN. pt encountered  Semi smith in bed   denies pain, and agrees to participate in session, +  Heplock , + O2  2 LPM via nC / Tele / Pulse ox / Condom cath ,NAD.  1:1 PCA at bedside

## 2025-06-05 NOTE — CONSULT NOTE ADULT - ASSESSMENT
Patient is a  70 year old male with past medical history of  COPD (on 2L home O2), diabetes HFrEF 43%, ischemic cardiomyopathy s/p CRT-D, (Entresto, spironolactone 25, torsemide  20mg AVR, hypertension, paroxysmal A-fib (status post ablation 1/14/25) on amiodarone Eliquis, CAD s/p PCI (2019), hyperlipidemia, chronic leukocytosis recent admission 5/13/25-5/25/25 who presents complaining of difficulty breathing      KAREN on CKD 3 / CRS   Acute respiratory failure / ADHF / HFrEF / COPD    - maintain on lasix 40mg iv q12 / no need for free water restriction / restrict salt intake  - monitor urine output / daily bladder scan Patient is a  70 year old male with past medical history of  COPD (on 2L home O2), diabetes HFrEF 43%, ischemic cardiomyopathy s/p CRT-D, (Entresto, spironolactone 25, torsemide  20mg AVR, hypertension, paroxysmal A-fib (status post ablation 1/14/25) on amiodarone Eliquis, CAD s/p PCI (2019), hyperlipidemia, chronic leukocytosis recent admission 5/13/25-5/25/25 who presents complaining of difficulty breathing      KAREN on CKD 3 / CRS   Acute respiratory failure / ADHF / HFrEF / COPD  episodes of v-tach/ cardio following  renal ultrasound reviewed- no hydronephrosis  - maintain on lasix 40mg iv q12 until pt is euvolemic, then transition to po torsemide / no need for free water restriction / restrict salt intake  - monitor urine output / daily bladder scan  - further w/u for proteinuria and baseline CKD can be done outpatient

## 2025-06-05 NOTE — CHART NOTE - NSCHARTNOTEFT_GEN_A_CORE
Pt found to have a new bruise under L eye. Pt unsure how this happened.    There is slight excoriation over bruise    Apply Bacitracin 2x daily and cont to monitor

## 2025-06-05 NOTE — CONSULT NOTE ADULT - CONSULT REQUESTED DATE/TIME
03-Jun-2025 06:11
03-Jun-2025 07:56
02-Jun-2025 16:52
03-Jun-2025 10:19
04-Jun-2025 10:48
05-Jun-2025 12:53

## 2025-06-05 NOTE — CHART NOTE - NSCHARTNOTEFT_GEN_A_CORE
Patient now with periods of V Tachy. He is also tachypneic. Will place BIPAP and see if that lessens V Tach episodes Patient now with periods of V Tachy. He is also tachypneic. Will place BIPAP and see if that lessens V Tach episodes (Patient's breathing improved after BIPAP applied and episodes of V Tach seem to have lessened- He is being followed by cardiology -continue to monitor)

## 2025-06-05 NOTE — PROGRESS NOTE ADULT - ASSESSMENT
Patient is a  70 year old male with past medical history of  COPD (on 2L home O2), diabetes HFrEF 43%, ischemic cardiomyopathy s/p CRT-D, (Entresto, spironolactone 25, torsemide  20mg AVR, hypertension, paroxysmal A-fib (status post ablation 1/14/25) on amiodarone Eliquis, CAD s/p PCI (2019), hyperlipidemia, chronic leukocytosis recent admission 5/13/25-5/25/25 who presents complaining of difficulty breathing that woke him up from sleep this afternoon associated with left sided chest pain.   Pt on EMS arrival patient hypoxic at 78% on nonrebreather and patient started on bipap.   Nitroglycerin started 2/2 to elevated BP.  Now off nitro and bipap.      Plan:    # Acute on chronic hypoxic respiratory failure 2/2 to pulmonary edema/ HFrEF exacerbation flash pulmonary edema   #Entorovirus+ (rhinovirus)  #Hx of COPD, possible component of COPD with CO2 retention, requiring bipap   - Monitor WBC and fever curve  - Chest x-ray: Bilateral opacities, worse. Left-sided permanent pacemaker.  - Last TTE that was done on 4/20/25 shows an EF of 30%, moderate pulmonary HTN, severely decreased systolic function.    - pBNP 2963  - daily weights  - Strict Is and Os  - Fluid restriction  -now back on home dose O2  - Pulmonology consult-diuresis, quickly wean steroids   -cardio consult-lasix 40 IV daily while inapteint and switch to po torsemide when more euvolemic   -discussed ABX with ID on 6/3, on po regimen  -supportive care for enterovirus  -pt was sob in am 6/4 and willing to get IV and now received some IV lasix  -6/5: overnight events noted, pt was agitated and was given 3mg IM haldol, also had lots of runs of NSVT on tele (I personally reviewed) and was placed on bipap for a few hours-now back on NC. This morning pt lethargic which I suspect is a combination of the effects of the haldol he received as well as being up and agitated most of the night per nursing. He is arousable and protecting his airway however. Check ABG to ensure not reattaining, AVOID using haldol or ativan, do frequent reorientation and 1:1 sit if needed, only if truly needed can trial zyprexa. I discussed case directly with cardio who will review tele and interrogate device (pt has CRT-D and currently pacing on tele). Replete K with IV, ensure K>4, Mg>2.      #KAREN, suspect cardiorenal-improved  -urine studies sent, FENA >6, not consistent with prerenal   -renal/bladder US with no hydro   -did not receive contrast, no ATN  -given Iv lasix and now improved. Discussed today with cardio NP that did IVC and is small and collapsible, now stop diuretics for now and monitor volume status closely   -continue holding farxiga, entresto, and aldactone, restart when karen resolves   -f/u nephrology eval as complicated patient   -strict INs/outs, monitor UO     #noncompliance  -pt is noncompliant with meds at home and also here, pulling out IVs and refusing meds  -palliaitve care consult apprecaited-DNR/DNI    # Chronic leukocytosis  -monitor CBC, slightly higher  then usual, c/w po abx for now     # DM type 2   - ISS  - Hold other diabetic meds used at home       #CAD with stents,   - Elevated troponin - suspect demand ischemia  - Cardiology apprecaited       # A Fib (PAF)   - DOAC, amiodarone, metoprolol     # Hypertension   - Monitor on current meds    # GERD   - On pepcid at home    # History of CVA - noted     # Dyslipidemia - continue usual meds     CHG ordered  GI ppx: PPI    dvt ppx - doac    Pending: stop lasix for now, check ABG, monitor Cr, f/u nephro, c/w tele monitoring-cardio f/u, replete K, remains active for now   Prognosis guarded, high risk patient      Patient is a  70 year old male with past medical history of  COPD (on 2L home O2), diabetes HFrEF 43%, ischemic cardiomyopathy s/p CRT-D, (Entresto, spironolactone 25, torsemide  20mg AVR, hypertension, paroxysmal A-fib (status post ablation 1/14/25) on amiodarone Eliquis, CAD s/p PCI (2019), hyperlipidemia, chronic leukocytosis recent admission 5/13/25-5/25/25 who presents complaining of difficulty breathing that woke him up from sleep this afternoon associated with left sided chest pain.   Pt on EMS arrival patient hypoxic at 78% on nonrebreather and patient started on bipap.   Nitroglycerin started 2/2 to elevated BP.  Now off nitro and bipap.      Plan:    # Acute on chronic hypoxic respiratory failure 2/2 to pulmonary edema/ HFrEF exacerbation flash pulmonary edema   #Entorovirus+ (rhinovirus)  #Hx of COPD, possible component of COPD with CO2 retention, requiring bipap   - Monitor WBC and fever curve  - Chest x-ray: Bilateral opacities, worse. Left-sided permanent pacemaker.  - Last TTE that was done on 4/20/25 shows an EF of 30%, moderate pulmonary HTN, severely decreased systolic function.    - pBNP 2963  - daily weights  - Strict Is and Os  - Fluid restriction  -now back on home dose O2  - Pulmonology consult-diuresis, quickly wean steroids   -cardio consult-lasix 40 IV daily while inapteint and switch to po torsemide when more euvolemic   -discussed ABX with ID on 6/3, on po regimen  -supportive care for enterovirus  -pt was sob in am 6/4 and willing to get IV and now received some IV lasix  -6/5: overnight events noted, pt was agitated and was given 3mg IM haldol, also had lots of runs of NSVT on tele (I personally reviewed) and was placed on bipap for a few hours-now back on NC. This morning pt lethargic which I suspect is a combination of the effects of the haldol he received as well as being up and agitated most of the night per nursing. He is arousable and protecting his airway however. Check ABG to ensure not reattaining, AVOID using haldol or ativan, do frequent reorientation and 1:1 sit if needed, only if truly needed can trial zyprexa. I discussed case directly with cardio who will review tele and interrogate device (pt has CRT-D and currently pacing on tele). Replete K with IV, ensure K>4, Mg>2.      #KAREN, suspect cardiorenal-improved  -urine studies sent, FENA >6, not consistent with prerenal   -renal/bladder US with no hydro   -did not receive contrast, no ATN  -given Iv lasix and now improved. Discussed today with cardio NP that did IVC and is small and collapsible, now stop diuretics for now and monitor volume status closely   -continue holding farxiga, entresto, and aldactone, restart when karen resolves   -f/u nephrology eval as complicated patient   -strict INs/outs, monitor UO     #noncompliance  -pt is noncompliant with meds at home and also here, pulling out IVs and refusing meds  -palliaitve care consult apprecaited-DNR/DNI    # Chronic leukocytosis  -monitor CBC, slightly higher  then usual, c/w po abx for now     # DM type 2   - ISS  - Hold other diabetic meds used at home       #CAD with stents,   - Elevated troponin - suspect demand ischemia  - Cardiology apprecaited       # A Fib (PAF)   - DOAC, amiodarone, metoprolol     # Hypertension   - Monitor on current meds    # GERD   - On pepcid at home    # History of CVA - noted     # Dyslipidemia - continue usual meds     CHG ordered  GI ppx: PPI    dvt ppx - doac    Pending: stop lasix for now, check ABG, monitor Cr, f/u nephro, c/w tele monitoring-cardio f/u, replete K, remains active for now   Prognosis guarded, high risk patient     update: pt seen again this afternoon, abg noted and not retatining, some alkalosis likley from diuresis, mental status now back to baseline and pt awake and alert, conversing. Discussed with cardio who discussed with EP, increase BB dose.

## 2025-06-05 NOTE — PROGRESS NOTE ADULT - SUBJECTIVE AND OBJECTIVE BOX
MONIQUE LYONS 70y Male  MRN#: [id number]     Hospital Day: 3d      SUBJECTIVE  overnight pt was agitated and given IM haldol, also had runs of NSVT on tele and was placed on bipap, This morning pt sleepy but arousable and currently protecting airway, now back on nasal cannula.                                          ----------------------------------------------------------  OBJECTIVE  PAST MEDICAL & SURGICAL HISTORY  CHF (congestive heart failure)    Diabetes    CAD (coronary artery disease)    Chronic obstructive pulmonary disease (COPD)    HTN (hypertension)    Stented coronary artery    Dyslipidemia    GERD (gastroesophageal reflux disease)    Afib    CVA (cerebral vascular accident)    H/O heart artery stent    Heart valve replaced  aortic    History of Nissen fundoplication                                              -----------------------------------------------------------  ALLERGIES:  Bactrim (Unknown)  sulfa drugs (Unknown)                                            ------------------------------------------------------------    HOME MEDICATIONS  Home Medications:  atorvastatin 40 mg oral tablet: 1 tab(s) orally once a day (25 May 2025 03:34)  clopidogrel 75 mg oral tablet: 1 tab(s) orally once a day (25 May 2025 03:34)  eszopiclone 3 mg oral tablet: 1 tab(s) orally once a day (at bedtime) (25 May 2025 03:37)  ezetimibe 10 mg oral tablet: 1 orally once a day (25 May 2025 03:37)  famotidine 40 mg oral tablet: 1 tab(s) orally once a day (25 May 2025 03:37)  fenofibrate 145 mg oral tablet: 1 orally once a day (25 May 2025 03:37)  melatonin 5 mg oral tablet: 1 tab(s) orally once a day (at bedtime) (25 May 2025 03:37)  metFORMIN 500 mg oral tablet: 1 tab(s) orally 2 times a day (25 May 2025 03:37)  metoprolol succinate 50 mg oral tablet, extended release: 1 tab(s) orally once a day (25 May 2025 03:37)  Soaanz 20 mg oral tablet: 1 tab(s) orally every 12 hours (25 May 2025 04:26)  spironolactone 25 mg oral tablet: 1 tab(s) orally every 24 hours (25 May 2025 04:25)  Tresiba 100 units/mL subcutaneous solution: 34 unit(s) subcutaneous once a day (in the morning) (25 May 2025 04:25)                           MEDICATIONS:  STANDING MEDICATIONS  albuterol/ipratropium for Nebulization 3 milliLiter(s) Nebulizer every 6 hours  aMIOdarone    Tablet 200 milliGRAM(s) Oral daily  apixaban 5 milliGRAM(s) Oral every 12 hours  atorvastatin 40 milliGRAM(s) Oral at bedtime  bacitracin   Ointment 1 Application(s) Topical two times a day  cefpodoxime 200 milliGRAM(s) Oral every 12 hours  chlorhexidine 2% Cloths 1 Application(s) Topical <User Schedule>  clopidogrel Tablet 75 milliGRAM(s) Oral daily  dextrose 5%. 1000 milliLiter(s) IV Continuous <Continuous>  dextrose 5%. 1000 milliLiter(s) IV Continuous <Continuous>  dextrose 50% Injectable 25 Gram(s) IV Push once  dextrose 50% Injectable 12.5 Gram(s) IV Push once  dextrose 50% Injectable 25 Gram(s) IV Push once  doxycycline monohydrate Capsule 100 milliGRAM(s) Oral every 12 hours  ezetimibe 10 milliGRAM(s) Oral daily  famotidine    Tablet 40 milliGRAM(s) Oral daily  fenofibrate Tablet 145 milliGRAM(s) Oral daily  furosemide   Injectable 40 milliGRAM(s) IV Push daily  glucagon  Injectable 1 milliGRAM(s) IntraMuscular once  guaiFENesin  milliGRAM(s) Oral every 12 hours  insulin glargine Injectable (LANTUS) 34 Unit(s) SubCutaneous every morning  insulin lispro (ADMELOG) corrective regimen sliding scale   SubCutaneous three times a day before meals  melatonin 5 milliGRAM(s) Oral at bedtime  metoprolol succinate ER 50 milliGRAM(s) Oral daily  potassium chloride  20 mEq/100 mL IVPB 20 milliEquivalent(s) IV Intermittent every 2 hours  predniSONE   Tablet 40 milliGRAM(s) Oral daily  tiotropium 2.5 MICROgram(s) Inhaler 2 Puff(s) Inhalation daily    PRN MEDICATIONS  dextrose Oral Gel 15 Gram(s) Oral once PRN  sodium chloride 0.65% Nasal 1 Spray(s) Both Nostrils every 3 hours PRN  zolpidem 5 milliGRAM(s) Oral at bedtime PRN                                            ------------------------------------------------------------  VITAL SIGNS: Last 24 Hours  T(C): 36.4 (05 Jun 2025 04:58), Max: 36.4 (04 Jun 2025 13:56)  T(F): 97.5 (05 Jun 2025 04:58), Max: 97.6 (04 Jun 2025 13:56)  HR: 114 (05 Jun 2025 04:58) (71 - 124)  BP: 156/82 (05 Jun 2025 04:58) (120/78 - 156/82)  BP(mean): --  RR: 18 (05 Jun 2025 04:58) (18 - 26)  SpO2: 95% (05 Jun 2025 04:58) (94% - 97%)      06-04-25 @ 07:01  -  06-05-25 @ 07:00  --------------------------------------------------------  IN: 0 mL / OUT: 450 mL / NET: -450 mL                                             --------------------------------------------------------------  LABS:                        13.8   22.75 )-----------( 509      ( 05 Jun 2025 07:40 )             43.6     06-05    143  |  99  |  49[H]  ----------------------------<  137[H]  3.9   |  27  |  1.6[H]    Ca    9.7      05 Jun 2025 07:40  Mg     2.5     06-05    TPro  7.1  /  Alb  3.9  /  TBili  0.5  /  DBili  x   /  AST  18  /  ALT  17  /  AlkPhos  108  06-05      Urinalysis Basic - ( 05 Jun 2025 07:40 )    Color: x / Appearance: x / SG: x / pH: x  Gluc: 137 mg/dL / Ketone: x  / Bili: x / Urobili: x   Blood: x / Protein: x / Nitrite: x   Leuk Esterase: x / RBC: x / WBC x   Sq Epi: x / Non Sq Epi: x / Bacteria: x      ABG - ( 03 Jun 2025 18:37 )  pH, Arterial: 7.52  pH, Blood: x     /  pCO2: 25    /  pO2: 120   / HCO3: 20    / Base Excess: -0.9  /  SaO2: 99.1                    Culture - Blood (collected 02 Jun 2025 18:05)  Source: Blood Blood  Preliminary Report (05 Jun 2025 02:01):    No growth at 48 Hours    Culture - Blood (collected 02 Jun 2025 18:05)  Source: Blood Blood  Preliminary Report (05 Jun 2025 02:01):    No growth at 48 Hours                                                    -------------------------------------------------------------  RADIOLOGY:  CXR  Xray Chest 1 View AP/PA:   ACC: [id number] EXAM:  XR CHEST 1 VIEW   ORDERED BY: MAGO BALDWIN     PROCEDURE DATE:  06/03/2025          INTERPRETATION:  CLINICAL HISTORY: Pulmonary edema and shortness of   breath.    COMPARISON: Chest radiograph 6/2/2025.    TECHNIQUE: Frontal chest radiograph.    FINDINGS:    Support devices: Left chest pacemaker.    Cardiac/mediastinum/hilum: Unchanged.    Lung parenchyma/Pleura: Essentially stable bilateral opacities. Likely   right pleural effusion. No pneumothorax.    Skeleton/soft tissues: Unchanged.      IMPRESSION:    Essentially stable bilateral opacities.    Likely right pleural effusion which wasn't seen on prior exam.    --- End of Report ---            OTIS KATE MD; Attending Radiologist  This document has been electronically signed. Kleber  3 2025  2:29PM (06-03-25 @ 13:34)  Xray Chest 1 View- PORTABLE-Urgent:   ACC: [id number] EXAM:  XR CHEST PORTABLE URGENT 1V   ORDERED BY: RADHA VICTORIA     PROCEDURE DATE:  06/02/2025          INTERPRETATION:  CLINICAL HISTORY: Shortness of breath.    COMPARISON: May 30, 2025.    TECHNIQUE: Portable frontal chest radiograph. Low lung volume.    FINDINGS:    Support devices: Left-sided permanent pacemaker.    Cardiac/mediastinum/hilum: Stable.    Lung parenchyma/Pleura: Bilateral opacities, worse. No pneumothorax is   seen.    Skeleton/soft tissues: Stable.      IMPRESSION: Low lung volume.    Bilateral opacities, worse.    Left-sided permanent pacemaker.    --- End of Report ---            AISLINN BERTRAND MD; Attending Radiologist  This document has been electronically signed. Jun 2 2025  3:50PM (06-02-25 @ 15:26)      CT  CT Chest No Cont:   ACC: [id number] EXAM:  CT CHEST   ORDERED BY: ABENA NAYLOR     PROCEDURE DATE:  06/02/2025          INTERPRETATION:  Indication::Worsening pneumonia. Rule out effusion.      Shoulder joint are was performed from lung apices to adrenal glands.  Sagittal and coronal reformatted images were generated.    Comparison: CT chest-3/28/2025  Chest x-ray 6/2/2025      Findings:    Tubes/Lines/Devices : Left ICD.  Mediastinum/hilum: .No adenopathy or masses.  Chest Wall/ Breasts: battery pack left chestwall. No adenopathy.   Heart/Great Vessels:Poststernotomy. Cardiomegaly. No pericardial   effusions. Extensive coronary artery and aortic calcifications.    Abdomen: Visualized upper abdominal organs are unremarkable.  Bones and soft tissues: Degenerative changes thoracic spine  Lungs, Pleura, and Airways: Patent central airways. Large bilateral   pleural effusions with lower lobe compressive atelectasis. Patchy   groundglass opacities and consolidation of the upper lobes. No   pneumothorax  Pulmonary nodules:  No suspicious pulmonary nodules.  IMPRESSION:    Large bilateral pleural effusions with lower lobe compressive atelectasis.     Patchy groundglass opacities and consolidation of the upper lobes. No   pneumothorax  Extensive coronary arteries and aortic calcifications    --- End of Report ---            JAZLYN LO MD; Attending Radiologist  This document has been electronically signed. Kleber  3 2025 12:44PM (06-02-25 @ 20:51)                                            --------------------------------------------------------------    PHYSICAL EXAM:  GENERAL: NAD, lying in bed   CHEST/LUNG: decreased breath sounds, some crackles. Unlabored respirations  HEART:irregular   ABDOMEN: Soft, nontender, nondistended  EXTREMITIES:  No clubbing, cyanosis, or edema

## 2025-06-05 NOTE — PROGRESS NOTE ADULT - SUBJECTIVE AND OBJECTIVE BOX
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    DNI: Trial NIV    Handoff    MEWS Score    CHF (congestive heart failure)    Diabetes    CAD (coronary artery disease)    Chronic obstructive pulmonary disease (COPD)    HTN (hypertension)    Stented coronary artery    Dyslipidemia    GERD (gastroesophageal reflux disease)    Afib    CVA (cerebral vascular accident)    CHF exacerbation    Acute exacerbation of chronic heart failure    Acute pulmonary edema    KAREN (acute kidney injury)    Advance care planning    Palliative care by specialist    H/O heart artery stent    Heart valve replaced    History of Nissen fundoplication    AMS    3    [email or web address]        Review of systems: a complete 10- point review of systems was obtained and is negative except as stated in the interval history.    Vitals:  T(F): 97.5, Max: 97.6 (06-04 @ 13:56)  HR: 114 (71 - 124)  BP: 156/82 (120/78 - 156/82)  RR: 18 (18 - 26)  SpO2: 95% (94% - 97%)    Ins & outs:     06-04 @ 07:01  -  06-05 @ 07:00  --------------------------------------------------------  IN: 0 mL / OUT: 450 mL / NET: -450 mL      Weight trend:  Weight (kg): 75.9 (06-03)    Physical exam:  General: No apparent distress  HEENT: Anicteric sclera. Moist mucous membranes. no JVD noted   Cardiac: Regular rate and rhythm. No murmurs, rubs, or gallops.   Vascular: Symmetric radial pulses. Dorsalis pedis pulses palpable.   Respiratory: Normal effort. ***Bibasilar crackles/ Clear to ascultation***  Abdomen: Soft, nontender. Audible bowel sounds.   Extremities: Warm with *** edema. No cyanosis or clubbing.   Skin: Warm and dry. No rash.   Neurologic: Grossly normal motor function.   Psychiatric: Euthymic. Oriented to person, place, and time.     Data reviewed:  - Telemetry:  - ECG (date***):  - Echo (date***):  - Radiology:    - Labs:                        13.8   22.75 )-----------( 509      ( 05 Jun 2025 07:40 )             43.6     06-05    143  |  99  |  49[H]  ----------------------------<  137[H]  3.9   |  27  |  1.6[H]    Ca    9.7      05 Jun 2025 07:40  Mg     2.5     06-05    TPro  7.1  /  Alb  3.9  /  TBili  0.5  /  DBili  x   /  AST  18  /  ALT  17  /  AlkPhos  108  06-05    LIVER FUNCTIONS - ( 05 Jun 2025 07:40 )  Alb: 3.9 g/dL / Pro: 7.1 g/dL / ALK PHOS: 108 U/L / ALT: 17 U/L / AST: 18 U/L / GGT: x                     Urinalysis Basic - ( 05 Jun 2025 07:40 )    Color: x / Appearance: x / SG: x / pH: x  Gluc: 137 mg/dL / Ketone: x  / Bili: x / Urobili: x   Blood: x / Protein: x / Nitrite: x   Leuk Esterase: x / RBC: x / WBC x   Sq Epi: x / Non Sq Epi: x / Bacteria: x      - Cultures:    Culture - Blood (collected 06-02)  Source: Blood Blood  Preliminary Report:    No growth at 48 Hours    Culture - Blood (collected 06-02)  Source: Blood Blood  Preliminary Report:    No growth at 48 Hours      Medications:  albuterol/ipratropium for Nebulization 3 milliLiter(s) Nebulizer every 6 hours  aMIOdarone    Tablet 200 milliGRAM(s) Oral daily  apixaban 5 milliGRAM(s) Oral every 12 hours  atorvastatin 40 milliGRAM(s) Oral at bedtime  bacitracin   Ointment 1 Application(s) Topical two times a day  cefpodoxime 200 milliGRAM(s) Oral every 12 hours  chlorhexidine 2% Cloths 1 Application(s) Topical <User Schedule>  clopidogrel Tablet 75 milliGRAM(s) Oral daily  dextrose 50% Injectable 25 Gram(s) IV Push once  dextrose 50% Injectable 12.5 Gram(s) IV Push once  dextrose 50% Injectable 25 Gram(s) IV Push once  doxycycline monohydrate Capsule 100 milliGRAM(s) Oral every 12 hours  ezetimibe 10 milliGRAM(s) Oral daily  famotidine    Tablet 40 milliGRAM(s) Oral daily  fenofibrate Tablet 145 milliGRAM(s) Oral daily  furosemide   Injectable 40 milliGRAM(s) IV Push daily  glucagon  Injectable 1 milliGRAM(s) IntraMuscular once  guaiFENesin  milliGRAM(s) Oral every 12 hours  insulin glargine Injectable (LANTUS) 34 Unit(s) SubCutaneous every morning  insulin lispro (ADMELOG) corrective regimen sliding scale   SubCutaneous three times a day before meals  melatonin 5 milliGRAM(s) Oral at bedtime  metoprolol succinate ER 50 milliGRAM(s) Oral daily  potassium chloride  20 mEq/100 mL IVPB 20 milliEquivalent(s) IV Intermittent every 2 hours  predniSONE   Tablet 40 milliGRAM(s) Oral daily  tiotropium 2.5 MICROgram(s) Inhaler 2 Puff(s) Inhalation daily    Drips:  dextrose 5%. 1000 milliLiter(s) (100 mL/Hr) IV Continuous <Continuous>  dextrose 5%. 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    Vitals:  T(F): 97.5, Max: 97.6 (06-04 @ 13:56)  HR: 114 (71 - 124)  BP: 156/82 (120/78 - 156/82)  RR: 18 (18 - 26)  SpO2: 95% (94% - 97%)    Ins & outs:     06-04 @ 07:01  -  06-05 @ 07:00  --------------------------------------------------------  IN: 0 mL / OUT: 450 mL / NET: -450 mL      Weight trend:  Weight (kg): 75.9 (06-03)    Physical exam:  General: No apparent distress  HEENT: Anicteric sclera. Moist mucous membranes. no JVD noted   Cardiac: Regular rate and rhythm. No murmurs, rubs, or gallops.   Vascular: Symmetric radial pulses. Dorsalis pedis pulses palpable.   Respiratory: diminished at bases and + wheezing   Abdomen: Soft, nontender. Audible bowel sounds.   Extremities: Warm with no edema. No cyanosis or clubbing.   Skin: Warm and dry. No rash.   Neurologic: Grossly normal motor function.   Psychiatric: Euthymic. Oriented to person, place, and time.     Data reviewed:  - Telemetry: frequent non sustained PVC   - ECG < from: 12 Lead ECG (06.02.25 @ 14:52) >  Ventricular Rate 116 BPM    Atrial Rate 54 BPM    QRS Duration 128 ms    Q-T Interval 348 ms    QTC Calculation(Bazett) 483 ms    R Axis -64 degrees    T Axis 103 degrees    Diagnosis Line    Atrial fibrillation with rapid ventricular response withpremature ventricular  or aberrantly conducted complexes  ventricular-paced complexes  Left axis deviation  Nonspecific T wave abnormality , probably digitalis effect  Abnormal EC    < end of copied text >    - Echo (< from: TTE Echo Complete w/o Contrast w/ Doppler (04.20.25 @ 13:14) >  Summary:   1. LV Ejection Fraction by Beck's Method with a biplane EF of 30 %.   2. Severely decreased global left ventricular systolic function.   3. Endocardial visualization was enhanced with intravenous echo contrast.   4. Left atrial enlargement.   5. Mildly reduced RV systolic function.   6. Mild mitral valve regurgitation.   7. Thickening of the anterior and posterior mitral valve leaflets.   8. Moderate tricuspid regurgitation.   9. Bioprosthetic aortic valve in place. Peak/mean lklkjika42.9/15.3   mmHg.  10. Estimated pulmonary artery systolic pressure is 59.9 mmHg assuming a   right atrial pressure of 3 mmHg, which is consistent with moderate   pulmonary hypertension.  11. Likely PFO noted by color doppler wtih left to right shunt.    < end of copied text >    - Radiology:    - Labs:                        13.8   22.75 )-----------( 509      ( 05 Jun 2025 07:40 )             43.6     06-05    143  |  99  |  49[H]  ----------------------------<  137[H]  3.9   |  27  |  1.6[H]    Ca    9.7      05 Jun 2025 07:40  Mg     2.5     06-05    TPro  7.1  /  Alb  3.9  /  TBili  0.5  /  DBili  x   /  AST  18  /  ALT  17  /  AlkPhos  108  06-05    LIVER FUNCTIONS - ( 05 Jun 2025 07:40 )  Alb: 3.9 g/dL / Pro: 7.1 g/dL / ALK PHOS: 108 U/L / ALT: 17 U/L / AST: 18 U/L / GGT: x               Urinalysis Basic - ( 05 Jun 2025 07:40 )    Color: x / Appearance: x / SG: x / pH: x  Gluc: 137 mg/dL / Ketone: x  / Bili: x / Urobili: x   Blood: x / Protein: x / Nitrite: x   Leuk Esterase: x / RBC: x / WBC x   Sq Epi: x / Non Sq Epi: x / Bacteria: x      - Cultures:    Culture - Blood (collected 06-02)  Source: Blood Blood  Preliminary Report:    No growth at 48 Hours    Culture - Blood (collected 06-02)  Source: Blood Blood  Preliminary Report:    No growth at 48 Hours      Medications:  albuterol/ipratropium for Nebulization 3 milliLiter(s) Nebulizer every 6 hours  aMIOdarone    Tablet 200 milliGRAM(s) Oral daily  apixaban 5 milliGRAM(s) Oral every 12 hours  atorvastatin 40 milliGRAM(s) Oral at bedtime  bacitracin   Ointment 1 Application(s) Topical two times a day  cefpodoxime 200 milliGRAM(s) Oral every 12 hours  chlorhexidine 2% Cloths 1 Application(s) Topical <User Schedule>  clopidogrel Tablet 75 milliGRAM(s) Oral daily  dextrose 50% Injectable 25 Gram(s) IV Push once  dextrose 50% Injectable 12.5 Gram(s) IV Push once  dextrose 50% Injectable 25 Gram(s) IV Push once  doxycycline monohydrate Capsule 100 milliGRAM(s) Oral every 12 hours  ezetimibe 10 milliGRAM(s) Oral daily  famotidine    Tablet 40 milliGRAM(s) Oral daily  fenofibrate Tablet 145 milliGRAM(s) Oral daily  furosemide   Injectable 40 milliGRAM(s) IV Push daily  glucagon  Injectable 1 milliGRAM(s) IntraMuscular once  guaiFENesin  milliGRAM(s) Oral every 12 hours  insulin glargine Injectable (LANTUS) 34 Unit(s) SubCutaneous every morning  insulin lispro (ADMELOG) corrective regimen sliding scale   SubCutaneous three times a day before meals  melatonin 5 milliGRAM(s) Oral at bedtime  metoprolol succinate ER 50 milliGRAM(s) Oral daily  potassium chloride  20 mEq/100 mL IVPB 20 milliEquivalent(s) IV Intermittent every 2 hours  predniSONE   Tablet 40 milliGRAM(s) Oral daily  tiotropium 2.5 MICROgram(s) Inhaler 2 Puff(s) Inhalation daily    Drips:  dextrose 5%. 1000 milliLiter(s) (100 mL/Hr) IV Continuous <Continuous>  dextrose 5%. 1000 milliLiter(s) (50 mL/Hr) IV Continuous <Continuous>    PRN:

## 2025-06-05 NOTE — PROGRESS NOTE ADULT - ASSESSMENT
70 year old male with past medical history of  COPD (on 2L home O2), diabetes HFrEF 43%, ischemic cardiomyopathy s/p CRT-D, (Entresto, spironolactone 25, torsemide  20mg AVR, hypertension, paroxysmal A-fib (status post ablation 1/14/25) on amiodarone Eliquis, CAD s/p PCI (2019), hyperlipidemia, chronic leukocytosis recent admission 5/13/25-5/25/25 who presents complaining of difficulty breathing that woke him up from sleep, associated with chest discomfort         Impression  #SOB: Multifactorial  #HFrEF (EF 30%, s/p CRT-D)  #CAD s/p PCI to RCA and LAD  #Paroxysmal AFib on Eliquis  #COPD      Plan:  Patient luke warm and perfusing. POCUS IVC today  with wheezing on exam and up trending WBC   - please obtain ABG + Lactate   - Was on BiPap and was started on Nitro gtt. Currently is on NC, and off Nitro gtt  - Pt currently denies CP. States breathing has improved  - Clinically does not appear in ADHF  - Anticipate to transition to PO diuretics in 1-2 days   - Trops elevated possibly 2/2 to demand in the setting of Hypoxia. Higher in the past  - Continue with current GDMT for HFrEF  - Cont Eliquis for Afib  - Monitor lytes    70 year old male with past medical history of  COPD (on 2L home O2), diabetes HFrEF 43%, ischemic cardiomyopathy s/p CRT-D, (Entresto, spironolactone 25, torsemide  20mg AVR, hypertension, paroxysmal A-fib (status post ablation 1/14/25) on amiodarone Eliquis, CAD s/p PCI (2019), hyperlipidemia, chronic leukocytosis recent admission 5/13/25-5/25/25 who presents complaining of difficulty breathing that woke him up from sleep, associated with chest discomfort.      Impression:  #SOB: Multifactorial  #HFrEF (EF 30%, s/p CRT-D)  #CAD s/p PCI to RCA and LAD  #Paroxysmal AFib on Eliquis  #COPD      Plan:  Patient Luke warm and perfusing. POCUS IVC today measuring 1.68cm and collapsing >50%  - consider  transition to PO diuretics to keep net even and Continue with current GDMT for HFrEF  - patient  with wheezing on exam and up trending WBC and lethargic - >please obtain ABG + Lactate , f/u pulm and ID recs   - For  Afib - c/w AC, BB and amiodarone   - for frequent non sustained VTach on tele, will interrogate ICD and obtain EP consult   Get BMP twice daily with magnesium . Maintain potassium >4.0, Mg >2.2  Strict intake and output. Daily standing weight   Plan discussed with primary team  Will continue to follow       70 year old male with past medical history of  COPD (on 2L home O2), diabetes HFrEF 43%, ischemic cardiomyopathy s/p CRT-D, (Entresto, spironolactone 25, torsemide  20mg AVR, hypertension, paroxysmal A-fib (status post ablation 1/14/25) on amiodarone Eliquis, CAD s/p PCI (2019), hyperlipidemia, chronic leukocytosis with frequent readmission with volume overload iso dietary indiscretions.  The most recent admission 5/13/25-5/25/25 was with c/o of possible ICD shock. He now  presents again complaining of difficulty breathing that woke him up from sleep, associated with chest discomfort.      Impression:  #SOB: Multifactorial  #HFrEF (EF 30%, s/p CRT-D)  #CAD s/p PCI to RCA and LAD  #Paroxysmal AFib on Eliquis  #COPD      Plan:  Patient Luke warm and perfusing. POCUS IVC today measuring 1.68cm and collapsing >50%  - consider  transition to PO diuretics to keep net even and Continue with current GDMT for HFrEF  - patient  with wheezing on exam and up trending WBC and lethargic - >please obtain ABG + Lactate , f/u pulm and ID recs   - For  Afib - c/w AC, BB and amiodarone   - for frequent non sustained VTach on tele, will interrogate ICD and obtain EP consult   Get BMP twice daily with magnesium . Maintain potassium >4.0, Mg >2.2  Strict intake and output. Daily standing weight   Plan discussed with primary team  Will continue to follow

## 2025-06-06 ENCOUNTER — TRANSCRIPTION ENCOUNTER (OUTPATIENT)
Age: 70
End: 2025-06-06

## 2025-06-06 VITALS
RESPIRATION RATE: 18 BRPM | TEMPERATURE: 98 F | HEART RATE: 78 BPM | OXYGEN SATURATION: 98 % | SYSTOLIC BLOOD PRESSURE: 103 MMHG | DIASTOLIC BLOOD PRESSURE: 56 MMHG

## 2025-06-06 LAB
ALBUMIN SERPL ELPH-MCNC: 3.5 G/DL — SIGNIFICANT CHANGE UP (ref 3.5–5.2)
ALP SERPL-CCNC: 95 U/L — SIGNIFICANT CHANGE UP (ref 30–115)
ALT FLD-CCNC: 14 U/L — SIGNIFICANT CHANGE UP (ref 0–41)
ANION GAP SERPL CALC-SCNC: 12 MMOL/L — SIGNIFICANT CHANGE UP (ref 7–14)
AST SERPL-CCNC: 16 U/L — SIGNIFICANT CHANGE UP (ref 0–41)
BASOPHILS # BLD AUTO: 0.04 K/UL — SIGNIFICANT CHANGE UP (ref 0–0.2)
BASOPHILS NFR BLD AUTO: 0.2 % — SIGNIFICANT CHANGE UP (ref 0–1)
BILIRUB SERPL-MCNC: 0.6 MG/DL — SIGNIFICANT CHANGE UP (ref 0.2–1.2)
BUN SERPL-MCNC: 45 MG/DL — HIGH (ref 10–20)
CALCIUM SERPL-MCNC: 10.1 MG/DL — SIGNIFICANT CHANGE UP (ref 8.4–10.5)
CHLORIDE SERPL-SCNC: 100 MMOL/L — SIGNIFICANT CHANGE UP (ref 98–110)
CO2 SERPL-SCNC: 31 MMOL/L — SIGNIFICANT CHANGE UP (ref 17–32)
CREAT SERPL-MCNC: 1.4 MG/DL — SIGNIFICANT CHANGE UP (ref 0.7–1.5)
EGFR: 54 ML/MIN/1.73M2 — LOW
EGFR: 54 ML/MIN/1.73M2 — LOW
EOSINOPHIL # BLD AUTO: 0.07 K/UL — SIGNIFICANT CHANGE UP (ref 0–0.7)
EOSINOPHIL NFR BLD AUTO: 0.4 % — SIGNIFICANT CHANGE UP (ref 0–8)
GLUCOSE SERPL-MCNC: 123 MG/DL — HIGH (ref 70–99)
HCT VFR BLD CALC: 44.6 % — SIGNIFICANT CHANGE UP (ref 42–52)
HGB BLD-MCNC: 13.6 G/DL — LOW (ref 14–18)
IMM GRANULOCYTES NFR BLD AUTO: 0.5 % — HIGH (ref 0.1–0.3)
LYMPHOCYTES # BLD AUTO: 1.91 K/UL — SIGNIFICANT CHANGE UP (ref 1.2–3.4)
LYMPHOCYTES # BLD AUTO: 11.1 % — LOW (ref 20.5–51.1)
MAGNESIUM SERPL-MCNC: 2.4 MG/DL — SIGNIFICANT CHANGE UP (ref 1.8–2.4)
MCHC RBC-ENTMCNC: 25.5 PG — LOW (ref 27–31)
MCHC RBC-ENTMCNC: 30.5 G/DL — LOW (ref 32–37)
MCV RBC AUTO: 83.7 FL — SIGNIFICANT CHANGE UP (ref 80–94)
MONOCYTES # BLD AUTO: 1.17 K/UL — HIGH (ref 0.1–0.6)
MONOCYTES NFR BLD AUTO: 6.8 % — SIGNIFICANT CHANGE UP (ref 1.7–9.3)
NEUTROPHILS # BLD AUTO: 13.99 K/UL — HIGH (ref 1.4–6.5)
NEUTROPHILS NFR BLD AUTO: 81 % — HIGH (ref 42.2–75.2)
NRBC BLD AUTO-RTO: 0 /100 WBCS — SIGNIFICANT CHANGE UP (ref 0–0)
PLATELET # BLD AUTO: 536 K/UL — HIGH (ref 130–400)
PMV BLD: 10.3 FL — SIGNIFICANT CHANGE UP (ref 7.4–10.4)
POTASSIUM SERPL-MCNC: 4.6 MMOL/L — SIGNIFICANT CHANGE UP (ref 3.5–5)
POTASSIUM SERPL-SCNC: 4.6 MMOL/L — SIGNIFICANT CHANGE UP (ref 3.5–5)
PROT SERPL-MCNC: 6.8 G/DL — SIGNIFICANT CHANGE UP (ref 6–8)
RBC # BLD: 5.33 M/UL — SIGNIFICANT CHANGE UP (ref 4.7–6.1)
RBC # FLD: 15.3 % — HIGH (ref 11.5–14.5)
SODIUM SERPL-SCNC: 143 MMOL/L — SIGNIFICANT CHANGE UP (ref 135–146)
WBC # BLD: 17.27 K/UL — HIGH (ref 4.8–10.8)
WBC # FLD AUTO: 17.27 K/UL — HIGH (ref 4.8–10.8)

## 2025-06-06 PROCEDURE — 99239 HOSP IP/OBS DSCHRG MGMT >30: CPT

## 2025-06-06 RX ORDER — CEFPODOXIME PROXETIL 200 MG/1
1 TABLET, FILM COATED ORAL
Qty: 8 | Refills: 0
Start: 2025-06-06 | End: 2025-06-09

## 2025-06-06 RX ORDER — PREDNISONE 20 MG/1
2 TABLET ORAL
Qty: 12 | Refills: 0
Start: 2025-06-06 | End: 2025-06-11

## 2025-06-06 RX ORDER — DEXTROMETHORPHAN HBR, GUAIFENESIN 200 MG/10ML
1 LIQUID ORAL
Qty: 10 | Refills: 0
Start: 2025-06-06 | End: 2025-06-10

## 2025-06-06 RX ORDER — DOXYCYCLINE HYCLATE 100 MG
2 TABLET ORAL
Qty: 16 | Refills: 0
Start: 2025-06-06 | End: 2025-06-09

## 2025-06-06 RX ADMIN — METOPROLOL SUCCINATE 75 MILLIGRAM(S): 50 TABLET, EXTENDED RELEASE ORAL at 05:23

## 2025-06-06 RX ADMIN — CLOPIDOGREL BISULFATE 75 MILLIGRAM(S): 75 TABLET, FILM COATED ORAL at 12:07

## 2025-06-06 RX ADMIN — IPRATROPIUM BROMIDE AND ALBUTEROL SULFATE 3 MILLILITER(S): .5; 2.5 SOLUTION RESPIRATORY (INHALATION) at 15:02

## 2025-06-06 RX ADMIN — AMIODARONE HYDROCHLORIDE 200 MILLIGRAM(S): 50 INJECTION, SOLUTION INTRAVENOUS at 05:23

## 2025-06-06 RX ADMIN — CEFPODOXIME PROXETIL 200 MILLIGRAM(S): 200 TABLET, FILM COATED ORAL at 05:23

## 2025-06-06 RX ADMIN — INSULIN LISPRO 6: 100 INJECTION, SOLUTION INTRAVENOUS; SUBCUTANEOUS at 17:21

## 2025-06-06 RX ADMIN — DEXTROMETHORPHAN HBR, GUAIFENESIN 600 MILLIGRAM(S): 200 LIQUID ORAL at 05:24

## 2025-06-06 RX ADMIN — Medication 1 APPLICATION(S): at 05:22

## 2025-06-06 RX ADMIN — APIXABAN 5 MILLIGRAM(S): 2.5 TABLET, FILM COATED ORAL at 05:24

## 2025-06-06 RX ADMIN — Medication 100 MILLIGRAM(S): at 05:24

## 2025-06-06 RX ADMIN — FENOFIBRATE 145 MILLIGRAM(S): 160 TABLET ORAL at 12:07

## 2025-06-06 RX ADMIN — Medication 20 MILLIGRAM(S): at 05:24

## 2025-06-06 RX ADMIN — Medication 1 APPLICATION(S): at 17:18

## 2025-06-06 RX ADMIN — DEXTROMETHORPHAN HBR, GUAIFENESIN 600 MILLIGRAM(S): 200 LIQUID ORAL at 17:17

## 2025-06-06 RX ADMIN — APIXABAN 5 MILLIGRAM(S): 2.5 TABLET, FILM COATED ORAL at 17:16

## 2025-06-06 RX ADMIN — INSULIN LISPRO 10: 100 INJECTION, SOLUTION INTRAVENOUS; SUBCUTANEOUS at 12:08

## 2025-06-06 RX ADMIN — IPRATROPIUM BROMIDE AND ALBUTEROL SULFATE 3 MILLILITER(S): .5; 2.5 SOLUTION RESPIRATORY (INHALATION) at 09:41

## 2025-06-06 RX ADMIN — Medication 1 APPLICATION(S): at 05:23

## 2025-06-06 RX ADMIN — Medication 40 MILLIGRAM(S): at 12:07

## 2025-06-06 RX ADMIN — Medication 100 MILLIGRAM(S): at 17:16

## 2025-06-06 RX ADMIN — IPRATROPIUM BROMIDE AND ALBUTEROL SULFATE 3 MILLILITER(S): .5; 2.5 SOLUTION RESPIRATORY (INHALATION) at 01:04

## 2025-06-06 RX ADMIN — INSULIN GLARGINE-YFGN 34 UNIT(S): 100 INJECTION, SOLUTION SUBCUTANEOUS at 08:25

## 2025-06-06 RX ADMIN — TIOTROPIUM BROMIDE INHALATION SPRAY 2 PUFF(S): 3.12 SPRAY, METERED RESPIRATORY (INHALATION) at 08:24

## 2025-06-06 RX ADMIN — EZETIMIBE 10 MILLIGRAM(S): 10 TABLET ORAL at 12:07

## 2025-06-06 RX ADMIN — INSULIN LISPRO 2: 100 INJECTION, SOLUTION INTRAVENOUS; SUBCUTANEOUS at 08:23

## 2025-06-06 RX ADMIN — CEFPODOXIME PROXETIL 200 MILLIGRAM(S): 200 TABLET, FILM COATED ORAL at 17:16

## 2025-06-06 RX ADMIN — PREDNISONE 40 MILLIGRAM(S): 20 TABLET ORAL at 05:24

## 2025-06-06 NOTE — PROGRESS NOTE ADULT - ASSESSMENT
70 year old male with past medical history of  COPD (on 2L home O2), diabetes HFrEF 43%, ischemic cardiomyopathy s/p CRT-D, (Entresto, spironolactone 25, torsemide  20mg AVR, hypertension, paroxysmal A-fib (status post ablation 1/14/25) on amiodarone Eliquis, CAD s/p PCI (2019), hyperlipidemia, chronic leukocytosis with frequent readmission with volume overload iso dietary indiscretions.  The most recent admission 5/13/25-5/25/25 was with c/o of possible ICD shock. He now  presents again complaining of difficulty breathing that woke him up from sleep, associated with chest discomfort.      Impression:  #SOB: Multifactorial  #HFrEF (EF 30%, s/p CRT-D)  #CAD s/p PCI to RCA and LAD  #NSTEMI/ frequent Non- sustained V-T on tele   #Paroxysmal AFib on Eliquis      Plan:  Patient warm and perfusing. euvolemic . remains on home o2 requirement.   - continue on po diuretics ( home diuretic regime was torsemide 20mg BID ) and monitor volume status closely   - pt with improvement in renal function please consider restarting home GDMT for HFrEF  - For  Afib - c/w AC, BB and amiodarone   - for frequent non sustained VTach on tele and with NSTEMI.  Patient will require ischemic evaluation if within GOC. Patient is currently DNR/DNI   - Maintain potassium >4.0, Mg >2.2  -Strict intake and output. Daily standing weight

## 2025-06-06 NOTE — DISCHARGE NOTE PROVIDER - ATTENDING DISCHARGE PHYSICAL EXAMINATION:
PHYSICAL EXAM:  GENERAL: NAD, sitting in bed comfortably  HEAD:  Atraumatic, Normocephalic  EYES: EOMI, PERRLA, conjunctiva and sclera clear  ENT: Moist mucous membranes  NECK: Supple, No JVD  CHEST/LUNG: diminished bs bl but improved ; No rales, rhonchi, wheezing, or rubs. Unlabored respirations  HEART: Regular rate and rhythm; No murmurs, rubs, or gallops  ABDOMEN: Bowel sounds present; Soft, Nontender, Nondistended.  EXTREMITIES:  2+ Peripheral Pulses, brisk capillary refill. No clubbing, cyanosis, or edema  NERVOUS SYSTEM:  Alert & Oriented X3, speech clear. No deficits   MSK: FROM all 4 extremities, full and equal strength  SKIN: No rashes or lesions

## 2025-06-06 NOTE — PROGRESS NOTE ADULT - NS ATTEND AMEND GEN_ALL_CORE FT
Patient seen and examined.  Agree with above.   Continue with beta blockers and amiodarone for nsvt seen on tele yesterday  Less ectopy thus far on tele today  Keep K > 4, Mg > 2  Would ideally check ischemic evaluation however patient refusing further workup, is agitated, and is DNR/DNI  Further cardiac workup depending on GOC    Armando Herron MD
Patient seen and examined.  Agree with above.   IVC normal in size and collapsable   Recommend transitioning to oral diuretics   Pt. with episodes of WCT on tele   Will interrogate AICD and have EP eval for AAD therapy evaluation     Armando Herron MD

## 2025-06-06 NOTE — DISCHARGE NOTE PROVIDER - CARE PROVIDERS DIRECT ADDRESSES
[email or web address]

## 2025-06-06 NOTE — DISCHARGE NOTE NURSING/CASE MANAGEMENT/SOCIAL WORK - FINANCIAL ASSISTANCE
[facility] provides services at a reduced cost to those who are determined to be eligible through [facility]’s financial assistance program. Information regarding [facility]’s financial assistance program can be found by going to [email or web address] or by calling [phone].

## 2025-06-06 NOTE — DISCHARGE NOTE PROVIDER - HOSPITAL COURSE
Patient is a  70 year old male with past medical history of  COPD (on 2L home O2), diabetes HFrEF 43%, ischemic cardiomyopathy s/p CRT-D, (Entresto, spironolactone 25, torsemide  20mg AVR, hypertension, paroxysmal A-fib (status post ablation 1/14/25) on amiodarone Eliquis, CAD s/p PCI (2019), hyperlipidemia, chronic leukocytosis recent admission 5/13/25-5/25/25 who presents complaining of difficulty breathing that woke him up from sleep this afternoon associated with left sided chest pain.   Pt on EMS arrival patient hypoxic at 78% on nonrebreather and patient started on bipap. Nitroglycerin started 2/2 to elevated BP.    # Acute on chronic hypoxic respiratory failure 2/2 to pulmonary edema/ HFrEF exacerbation flash pulmonary edema   #Entorovirus+ (rhinovirus)  #Hx of COPD, possible component of COPD with CO2 retention, requiring bipap   - Chest x-ray: Bilateral opacities, worse. Left-sided permanent pacemaker.  - Last TTE that was done on 4/20/25 shows an EF of 30%, moderate pulmonary HTN, severely decreased systolic function.    - pBNP 2963, monitored on telemetry   - Pulmonology consult-diuresis, quickly wean steroids   -cardio consult- lasix 40 IV daily while inapteint and switch to po torsemide when more euvolemic , monitored on telemetry , had  runs of NSVT , per cardio Would ideally check ischemic evaluation however patient refusing further workup, is agitated, and is DNR/DNI      #KAREN, suspect cardiorenal-improved  -urine studies sent, FENA >6, not consistent with prerenal   -renal/bladder US with no hydro  -seen by nephro, po torsemide as outpt,  further w/u for proteinuria and baseline CKD can be done outpatient    #noncompliance  -pt is noncompliant with meds at home and also here, pulling out IVs and refusing meds  -palliaitve care consult apprecaited-DNR/DNI    # Chronic leukocytosis  -monitor CBC, slightly higher  then usual, downtrending ,  treated with PO  abx   Patient is a  70 year old male with past medical history of  COPD (on 2L home O2), diabetes HFrEF 43%, ischemic cardiomyopathy s/p CRT-D, (Entresto, spironolactone 25, torsemide  20mg AVR, hypertension, paroxysmal A-fib (status post ablation 1/14/25) on amiodarone Eliquis, CAD s/p PCI (2019), hyperlipidemia, chronic leukocytosis recent admission 5/13/25-5/25/25 who presents complaining of difficulty breathing that woke him up from sleep this afternoon associated with left sided chest pain.   Pt on EMS arrival patient hypoxic at 78% on nonrebreather and patient started on bipap. Nitroglycerin started 2/2 to elevated BP.    # Acute on chronic hypoxic respiratory failure 2/2 to pulmonary edema/ HFrEF exacerbation flash pulmonary edema   #Entorovirus+ (rhinovirus)  #Hx of COPD, possible component of COPD with CO2 retention, requiring bipap   - Chest x-ray: Bilateral opacities, worse. Left-sided permanent pacemaker.  - Last TTE that was done on 4/20/25 shows an EF of 30%, moderate pulmonary HTN, severely decreased systolic function.    - pBNP 2963, monitored on telemetry   - Pulmonology consult-diuresis, quickly wean steroids   -cardio consult- lasix 40 IV daily while inapteint and switch to po torsemide when more euvolemic , monitored on telemetry , had  runs of NSVT , per cardio Would ideally check ischemic evaluation however patient refusing further workup, is agitated, and is DNR/DNI      #KAREN, suspect cardiorenal-improved  -urine studies sent, FENA >6, not consistent with prerenal   -renal/bladder US with no hydro  -seen by nephro, po torsemide as outpt,  further w/u for proteinuria and baseline CKD can be done outpatient    #noncompliance  -pt is noncompliant with meds at home and also here, pulling out IVs and refusing meds  -palliaitve care consult apprecaited-DNR/DNI    # Chronic leukocytosis  -monitor CBC, slightly higher  then usual, downtrending ,  treated with PO  abx    PT medically stable   cleared for dc  dw attending Patient is a  70 year old male with past medical history of  COPD (on 2L home O2), diabetes HFrEF 43%, ischemic cardiomyopathy s/p CRT-D, (Entresto, spironolactone 25, torsemide  20mg AVR, hypertension, paroxysmal A-fib (status post ablation 1/14/25) on amiodarone Eliquis, CAD s/p PCI (2019), hyperlipidemia, chronic leukocytosis recent admission 5/13/25-5/25/25 who presents complaining of difficulty breathing that woke him up from sleep this afternoon associated with left sided chest pain.   Pt on EMS arrival patient hypoxic at 78% on nonrebreather and patient started on bipap. Nitroglycerin started 2/2 to elevated BP.    # Acute on chronic hypoxic respiratory failure 2/2 to pulmonary edema/ HFrEF exacerbation flash pulmonary edema   #Enteroviruses+ (rhinovirus)  #Hx of COPD, possible component of COPD with CO2 retention, requiring bipap   - Chest x-ray: Bilateral opacities, worse. Left-sided permanent pacemaker.  - Last TTE that was done on 4/20/25 shows an EF of 30%, moderate pulmonary HTN, severely decreased systolic function.    - pBNP 2963, monitored on telemetry   - Pulmonology consult recommends diuresis which have been trasnitioned to po, wean po prednisone at home   -cardio consult- lasix 40 IV daily while impatent and switched to po torsemide upon dc , edema improved , back to baseline 02    monitored on telemetry , had  runs of NSVT , per cardio Would ideally check ischemic evaluation however patient refusing further workup inpatient  and is DNR/DNI    #KAREN, suspect cardiorenal-improved  -urine studies sent, FENA >6, not consistent with prerenal   -renal/bladder US with no hydro  -seen by nephro, po torsemide as outpt,  further w/u for proteinuria and baseline CKD can be done outpatient    #noncompliance  -pt is noncompliant with meds at home and also here, pulling out IVs and refusing meds  -palliaitve care consult apprecaited-DNR/DNI    # Chronic leukocytosis  -monitor CBC, slightly higher  then usual, downtrending ,  treated with PO  abx    PT medically stable for dc   dw attending

## 2025-06-06 NOTE — PROGRESS NOTE ADULT - SUBJECTIVE AND OBJECTIVE BOX
Chief complaint: Patient is a 70y old  Male who presents with a chief complaint of Shortness of breath (03 Jun 2025 10:19)    Interval history:  Patient was examined. 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    Vitals:  ICU Vital Signs Last 24 Hrs  T(C): 36.9 (06 Jun 2025 05:27), Max: 37.1 (06 Jun 2025 04:50)  T(F): 98.4 (06 Jun 2025 05:27), Max: 98.7 (06 Jun 2025 04:50)  HR: 90 (06 Jun 2025 05:27) (53 - 98)  BP: 123/74 (06 Jun 2025 05:27) (123/74 - 154/86)  BP(mean): --  ABP: --  ABP(mean): --  RR: 18 (06 Jun 2025 05:27) (18 - 18)  SpO2: 98% (06 Jun 2025 05:27) (94% - 100%)    O2 Parameters below as of 05 Jun 2025 20:21  Patient On (Oxygen Delivery Method): nasal cannula  O2 Flow (L/min): 3      I&O's Summary    05 Jun 2025 07:01  -  06 Jun 2025 07:00  --------------------------------------------------------  IN: 0 mL / OUT: 800 mL / NET: -800 mL    06 Jun 2025 07:01  -  06 Jun 2025 10:56  --------------------------------------------------------  IN: 0 mL / OUT: 800 mL / NET: -800 mL      Physical exam:  General: No apparent distress  HEENT: Anicteric sclera. Moist mucous membranes. no JVD noted   Cardiac: Regular rate and rhythm. No murmurs, rubs, or gallops.   Vascular: Symmetric radial pulses. Dorsalis pedis pulses palpable.   Respiratory: diminished at bases and + wheezing   Abdomen: Soft, nontender. Audible bowel sounds.   Extremities: Warm with no edema. No cyanosis or clubbing.   Skin: Warm and dry. No rash.   Neurologic: Grossly normal motor function.   Psychiatric: Euthymic. Oriented to person, place, and time.     Data reviewed:  - Telemetry: frequent non sustained PVC   - ECG < from: 12 Lead ECG (06.02.25 @ 14:52) >  Ventricular Rate 116 BPM    Atrial Rate 54 BPM    QRS Duration 128 ms    Q-T Interval 348 ms    QTC Calculation(Bazett) 483 ms    R Axis -64 degrees    T Axis 103 degrees    Diagnosis Line    Atrial fibrillation with rapid ventricular response withpremature ventricular  or aberrantly conducted complexes  ventricular-paced complexes  Left axis deviation  Nonspecific T wave abnormality , probably digitalis effect  Abnormal EC    < end of copied text >    - Echo (< from: TTE Echo Complete w/o Contrast w/ Doppler (04.20.25 @ 13:14) >  Summary:   1. LV Ejection Fraction by Beck's Method with a biplane EF of 30 %.   2. Severely decreased global left ventricular systolic function.   3. Endocardial visualization was enhanced with intravenous echo contrast.   4. Left atrial enlargement.   5. Mildly reduced RV systolic function.   6. Mild mitral valve regurgitation.   7. Thickening of the anterior and posterior mitral valve leaflets.   8. Moderate tricuspid regurgitation.   9. Bioprosthetic aortic valve in place. Peak/mean eoziqual10.9/15.3   mmHg.  10. Estimated pulmonary artery systolic pressure is 59.9 mmHg assuming a   right atrial pressure of 3 mmHg, which is consistent with moderate   pulmonary hypertension.  11. Likely PFO noted by color doppler wtih left to right shunt.    < end of copied text >    - Radiology:    - Labs:                        13.8   22.75 )-----------( 509      ( 05 Jun 2025 07:40 )             43.6     06-05    143  |  99  |  49[H]  ----------------------------<  137[H]  3.9   |  27  |  1.6[H]    Ca    9.7      05 Jun 2025 07:40  Mg     2.5     06-05    TPro  7.1  /  Alb  3.9  /  TBili  0.5  /  DBili  x   /  AST  18  /  ALT  17  /  AlkPhos  108  06-05    LIVER FUNCTIONS - ( 05 Jun 2025 07:40 )  Alb: 3.9 g/dL / Pro: 7.1 g/dL / ALK PHOS: 108 U/L / ALT: 17 U/L / AST: 18 U/L / GGT: x               Urinalysis Basic - ( 05 Jun 2025 07:40 )    Color: x / Appearance: x / SG: x / pH: x  Gluc: 137 mg/dL / Ketone: x  / Bili: x / Urobili: x   Blood: x / Protein: x / Nitrite: x   Leuk Esterase: x / RBC: x / WBC x   Sq Epi: x / Non Sq Epi: x / Bacteria: x      - Cultures:    Culture - Blood (collected 06-02)  Source: Blood Blood  Preliminary Report:    No growth at 48 Hours    Culture - Blood (collected 06-02)  Source: Blood Blood  Preliminary Report:    No growth at 48 Hours      Medications:  albuterol/ipratropium for Nebulization 3 milliLiter(s) Nebulizer every 6 hours  aMIOdarone    Tablet 200 milliGRAM(s) Oral daily  apixaban 5 milliGRAM(s) Oral every 12 hours  atorvastatin 40 milliGRAM(s) Oral at bedtime  bacitracin   Ointment 1 Application(s) Topical two times a day  cefpodoxime 200 milliGRAM(s) Oral every 12 hours  chlorhexidine 2% Cloths 1 Application(s) Topical <User Schedule>  clopidogrel Tablet 75 milliGRAM(s) Oral daily  dextrose 50% Injectable 25 Gram(s) IV Push once  dextrose 50% Injectable 12.5 Gram(s) IV Push once  dextrose 50% Injectable 25 Gram(s) IV Push once  doxycycline monohydrate Capsule 100 milliGRAM(s) Oral every 12 hours  ezetimibe 10 milliGRAM(s) Oral daily  famotidine    Tablet 40 milliGRAM(s) Oral daily  fenofibrate Tablet 145 milliGRAM(s) Oral daily  furosemide   Injectable 40 milliGRAM(s) IV Push daily  glucagon  Injectable 1 milliGRAM(s) IntraMuscular once  guaiFENesin  milliGRAM(s) Oral every 12 hours  insulin glargine Injectable (LANTUS) 34 Unit(s) SubCutaneous every morning  insulin lispro (ADMELOG) corrective regimen sliding scale   SubCutaneous three times a day before meals  melatonin 5 milliGRAM(s) Oral at bedtime  metoprolol succinate ER 50 milliGRAM(s) Oral daily  potassium chloride  20 mEq/100 mL IVPB 20 milliEquivalent(s) IV Intermittent every 2 hours  predniSONE   Tablet 40 milliGRAM(s) Oral daily  tiotropium 2.5 MICROgram(s) Inhaler 2 Puff(s) Inhalation daily    Drips:  dextrose 5%. 1000 milliLiter(s) (100 mL/Hr) IV Continuous <Continuous>  dextrose 5%. 1000 milliLiter(s) (50 mL/Hr) IV Continuous <Continuous>    PRN:

## 2025-06-06 NOTE — DISCHARGE NOTE NURSING/CASE MANAGEMENT/SOCIAL WORK - PATIENT PORTAL LINK FT
You can access the FollowMyHealth Patient Portal offered by [facility] by registering at the following website: [email or web address]. By joining [facility]’s FollowMyHealth portal, you will also be able to view your health information using other applications (apps) compatible with our system.

## 2025-06-06 NOTE — DISCHARGE NOTE PROVIDER - CARE PROVIDER_API CALL
Armando Herron  Cardiovascular Disease  [address] [place] [address]  Phone: [phone]  Fax: [phone]  Follow Up Time: 1 week    Sujit Suggs  Pulmonary Disease  [address] [place] [address]  Phone: [phone]  Fax: [phone]  Follow Up Time: 1 week    Martin Shaver  Internal Medicine  [address] [place] [id number]  Phone: [phone]  Fax: [phone]  Follow Up Time: 1 week    Cecilia Carrera  Cardiovascular Disease  [address] [place] [address]  Phone: [phone]  Fax: [phone]  Follow Up Time: 1 week

## 2025-06-06 NOTE — DISCHARGE NOTE PROVIDER - PROVIDER TOKENS
PROVIDER:[TOKEN:[[id number]],FOLLOWUP:[1 week]],PROVIDER:[TOKEN:[[id number]],FOLLOWUP:[1 week]],PROVIDER:[TOKEN:[[id number]],FOLLOWUP:[1 week]],PROVIDER:[TOKEN:[[id number]],FOLLOWUP:[1 week]]

## 2025-06-06 NOTE — DISCHARGE NOTE PROVIDER - NSDCCPCAREPLAN_GEN_ALL_CORE_FT
PRINCIPAL DISCHARGE DIAGNOSIS  Diagnosis: CHF exacerbation  Assessment and Plan of Treatment: # Acute on chronic hypoxic respiratory failure 2/2 to pulmonary edema/ HFrEF exacerbation flash pulmonary edema   you had  Chest x-ray: Bilateral opacities, worse. Left-sided permanent pacemaker. You were moniored on telemetry, seen by cardiology  treated with IV lasix,  you had runs of NSVT , was offred ischemic evaluation however  you refused further workup, follow up with cardiology as oupt         SECONDARY DISCHARGE DIAGNOSES  Diagnosis: KAREN (acute kidney injury)  Assessment and Plan of Treatment: you had renal/bladder US with no hydro  -seen by nephro, was transitioned to  po torsemide  further w/u for proteinuria and baseline CKD can be done outpatient

## 2025-06-06 NOTE — DISCHARGE NOTE PROVIDER - NSDCFUSCHEDAPPT_GEN_ALL_CORE_FT
[facility] Physician [facility]  CARDIOLOGY [address] [facility]  Scheduled Appointment: 08/21/2025

## 2025-06-08 LAB
CULTURE RESULTS: SIGNIFICANT CHANGE UP
CULTURE RESULTS: SIGNIFICANT CHANGE UP
SPECIMEN SOURCE: SIGNIFICANT CHANGE UP
SPECIMEN SOURCE: SIGNIFICANT CHANGE UP

## 2025-06-11 DIAGNOSIS — E11.9 TYPE 2 DIABETES MELLITUS WITHOUT COMPLICATIONS: ICD-10-CM

## 2025-06-11 DIAGNOSIS — I25.10 ATHEROSCLEROTIC HEART DISEASE OF NATIVE CORONARY ARTERY WITHOUT ANGINA PECTORIS: ICD-10-CM

## 2025-06-11 DIAGNOSIS — I47.20 VENTRICULAR TACHYCARDIA, UNSPECIFIED: ICD-10-CM

## 2025-06-11 DIAGNOSIS — I27.20 PULMONARY HYPERTENSION, UNSPECIFIED: ICD-10-CM

## 2025-06-11 DIAGNOSIS — N17.9 ACUTE KIDNEY FAILURE, UNSPECIFIED: ICD-10-CM

## 2025-06-11 DIAGNOSIS — K21.9 GASTRO-ESOPHAGEAL REFLUX DISEASE WITHOUT ESOPHAGITIS: ICD-10-CM

## 2025-06-11 DIAGNOSIS — Z88.2 ALLERGY STATUS TO SULFONAMIDES: ICD-10-CM

## 2025-06-11 DIAGNOSIS — I11.0 HYPERTENSIVE HEART DISEASE WITH HEART FAILURE: ICD-10-CM

## 2025-06-11 DIAGNOSIS — D84.81 IMMUNODEFICIENCY DUE TO CONDITIONS CLASSIFIED ELSEWHERE: ICD-10-CM

## 2025-06-11 DIAGNOSIS — D72.829 ELEVATED WHITE BLOOD CELL COUNT, UNSPECIFIED: ICD-10-CM

## 2025-06-11 DIAGNOSIS — B97.4 RESPIRATORY SYNCYTIAL VIRUS AS THE CAUSE OF DISEASES CLASSIFIED ELSEWHERE: ICD-10-CM

## 2025-06-11 DIAGNOSIS — B34.1 ENTEROVIRUS INFECTION, UNSPECIFIED: ICD-10-CM

## 2025-06-11 DIAGNOSIS — I48.0 PAROXYSMAL ATRIAL FIBRILLATION: ICD-10-CM

## 2025-06-11 DIAGNOSIS — E78.5 HYPERLIPIDEMIA, UNSPECIFIED: ICD-10-CM

## 2025-06-11 DIAGNOSIS — J18.9 PNEUMONIA, UNSPECIFIED ORGANISM: ICD-10-CM

## 2025-06-11 DIAGNOSIS — Z86.73 PERSONAL HISTORY OF TRANSIENT ISCHEMIC ATTACK (TIA), AND CEREBRAL INFARCTION WITHOUT RESIDUAL DEFICITS: ICD-10-CM

## 2025-06-11 DIAGNOSIS — Z95.3 PRESENCE OF XENOGENIC HEART VALVE: ICD-10-CM

## 2025-06-11 DIAGNOSIS — Z66 DO NOT RESUSCITATE: ICD-10-CM

## 2025-06-11 DIAGNOSIS — Z95.5 PRESENCE OF CORONARY ANGIOPLASTY IMPLANT AND GRAFT: ICD-10-CM

## 2025-06-11 DIAGNOSIS — I25.5 ISCHEMIC CARDIOMYOPATHY: ICD-10-CM

## 2025-06-11 DIAGNOSIS — J44.1 CHRONIC OBSTRUCTIVE PULMONARY DISEASE WITH (ACUTE) EXACERBATION: ICD-10-CM

## 2025-06-11 DIAGNOSIS — J96.01 ACUTE RESPIRATORY FAILURE WITH HYPOXIA: ICD-10-CM

## 2025-06-11 DIAGNOSIS — I50.23 ACUTE ON CHRONIC SYSTOLIC (CONGESTIVE) HEART FAILURE: ICD-10-CM

## 2025-06-14 NOTE — CONSULT NOTE ADULT - ASSESSMENT
MONIQUE LYONS is a 70y man with a medical history significant for COPD (on 2L home O2), ischemic cardiomyopathy (LVEF 43%, s/p PCI) s/p CRT-D, paroxysmal A-fib (s/p ablation 01/2025) on AC, CAD s/p PCI (2019), and multiple recent similar admissions who presented initially with progressive LESLIE and lower extremity swelling, now admitted with acute decompensated heart failure.  Pulmonary has been consulted for COPD component of respiratory symptoms.      IMPRESSION:    acute decompensated heart failure  acute pulmonary edema  not COPD exacerbation, doubt pneumonia given rapid improvement with NIV and lasix  pulm HTN multifactorial secondary to cardiomyopathy reduced EF, likely LAW, baseline COPD  chronic hypoxia from all the above  B/L small pleural effusions to  pulmonary edema      RECOMMENDATIONS:      - Diuresis to euvolemia per cardiology  - Wean FiO2 as tolerated to maintain spO2 92-96%   - NIV QHS and as needed during the day  - Agree with withholding systemic corticosteroid  - Continue home inhalers (inpatient formulary is Advair BID + Spiriva daily)  - Upon D/C the patient will need ICS/LABA, LAMA, PRN albuterol,   - Would benefit from OP NPSG r/o LAW as trigger for heart failure during sleep  - Expect multiple re admissions due to the end stage nature of the disease      Please do not hesitate to recall the pulmonary service for further evaluation and recommendation if the patient's pulmonary symptoms increase or if there are further clinical questions.  We will sign off at this time.      ********************************* INCOMPLETE NOTE ********************************     MONIQUE LYONS is a 70y man with a medical history significant for COPD (on 2L home O2), ischemic cardiomyopathy (LVEF 43%, s/p PCI) s/p CRT-D, paroxysmal A-fib (s/p ablation 01/2025) on AC, CAD s/p PCI (2019), and multiple recent similar admissions who presented initially with progressive LESLIE and lower extremity swelling, now admitted with acute decompensated heart failure.  Pulmonary has been consulted for COPD component of respiratory symptoms.      IMPRESSION:    acute decompensated heart failure  acute pulmonary edema  not COPD exacerbation, doubt pneumonia given rapid improvement with NIV and lasix  pulm HTN multifactorial secondary to cardiomyopathy reduced EF, likely LAW, baseline COPD  chronic hypoxia from all the above  B/L small pleural effusions to  pulmonary edema      RECOMMENDATIONS:      - Diuresis to euvolemia per cardiology  - Wean FiO2 as tolerated to maintain spO2 92-96%   - NIV QHS and as needed during the day  - Agree with withholding systemic corticosteroid  - Continue home inhalers (inpatient formulary is Advair BID + Spiriva daily)  - Upon D/C the patient will need ICS/LABA, LAMA, PRN albuterol,   - Would benefit from OP NPSG r/o LAW as trigger for heart failure during sleep  - Expect multiple re admissions due to the end stage nature of the disease          This note to be deleted as it was started prior to seeing the patient, patient was evaluated and treated by Dr Lee before I could evaluate him.  ********************************* INCOMPLETE NOTE ********************************

## 2025-06-14 NOTE — PATIENT PROFILE ADULT - FUNCTIONAL ASSESSMENT - DAILY ACTIVITY 2.
Patient requests all Lab, Cardiology, and Radiology Results on their Discharge Instructions
3 = A little assistance

## 2025-06-14 NOTE — ED ADULT NURSE NOTE - HISTORY OF COVID-19 VACCINATION
Patient Education    BRIGHT FUTURES HANDOUT- PARENT  3 YEAR VISIT  Here are some suggestions from [employer] experts that may be of value to your family.     HOW YOUR FAMILY IS DOING  Take time for yourself and to be with your partner.  Stay connected to friends, their personal interests, and work.  Have regular playtimes and mealtimes together as a family.  Give your child hugs. Show your child how much you love him.  Show your child how to handle anger well--time alone, respectful talk, or being active. Stop hitting, biting, and fighting right away.  Give your child the chance to make choices.  Don t smoke or use e-cigarettes. Keep your home and car smoke-free. Tobacco-free spaces keep children healthy.  Don t use alcohol or drugs.  If you are worried about your living or food situation, talk with us. Community agencies and programs such as WIC and SNAP can also provide information and assistance.    EATING HEALTHY AND BEING ACTIVE  Give your child 16 to 24 oz of milk every day.  Limit juice. It is not necessary. If you choose to serve juice, give no more than 4 oz a day of 100% juice and always serve it with a meal.  Let your child have cool water when she is thirsty.  Offer a variety of healthy foods and snacks, especially vegetables, fruits, and lean protein.  Let your child decide how much to eat.  Be sure your child is active at home and in  or .  Apart from sleeping, children should not be inactive for longer than 1 hour at a time.  Be active together as a family.  Limit TV, tablet, or smartphone use to no more than 1 hour of high-quality programs each day.  Be aware of what your child is watching.  Don t put a TV, computer, tablet, or smartphone in your child s bedroom.  Consider making a family media plan. It helps you make rules for media use and balance screen time with other activities, including exercise.    PLAYING WITH OTHERS  Give your child a variety of toys for dressing  up, make-believe, and imitation.  Make sure your child has the chance to play with other preschoolers often. Playing with children who are the same age helps get your child ready for school.  Help your child learn to take turns while playing games with other children.    READING AND TALKING WITH YOUR CHILD  Read books, sing songs, and play rhyming games with your child each day.  Use books as a way to talk together. Reading together and talking about a book s story and pictures helps your child learn how to read.  Look for ways to practice reading everywhere you go, such as stop signs, or labels and signs in the store.  Ask your child questions about the story or pictures in books. Ask him to tell a part of the story.  Ask your child specific questions about his day, friends, and activities.    SAFETY  Continue to use a car safety seat that is installed correctly in the back seat. The safest seat is one with a 5-point harness, not a booster seat.  Prevent choking. Cut food into small pieces.  Supervise all outdoor play, especially near streets and driveways.  Never leave your child alone in the car, house, or yard.  Keep your child within arm s reach when she is near or in water. She should always wear a life jacket when on a boat.  Teach your child to ask if it is OK to pet a dog or another animal before touching it.  If it is necessary to keep a gun in your home, store it unloaded and locked with the ammunition locked separately.  Ask if there are guns in homes where your child plays. If so, make sure they are stored safely.    WHAT TO EXPECT AT YOUR CHILD S 4 YEAR VISIT  We will talk about  Caring for your child, your family, and yourself  Getting ready for school  Eating healthy  Promoting physical activity and limiting TV time  Keeping your child safe at home, outside, and in the car      Helpful Resources: Smoking Quit Line: 729.652.2526  Family Media Use Plan: www.healthychildren.org/MediaUsePlan  Poison  Help Line:  481.115.8014  Information About Car Safety Seats: www.safercar.gov/parents  Toll-free Auto Safety Hotline: 876.175.6794  Consistent with Bright Futures: Guidelines for Health Supervision of Infants, Children, and Adolescents, 4th Edition  For more information, go to https://brightfutures.aap.org.            Vaccine status unknown
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
## 2025-06-14 NOTE — CONSULT NOTE ADULT - NS ATTEND AMEND GEN_ALL_CORE FT
The patient has a known ischemic CM severe LV dysfunction with EF of 30% , S/P CRT-D , COPD on home 02 , PAF on Amiodarone and Eliquis  who was admitted with worsening LESLIE and edema . The patient became hypoxic and needed intubation. The patient was noted to have a WBC count which is increased to to >50,000 which may have been leukemoid reaction vs underlying sepsis. There is also a question of compliance with his medication.  The patient was also recently treated for an enterovirus infection. On antibiotics as per ID. CXR shows pulmonary congestion and less likely amiodarone toxicity. Needs IV LAsix for now . Wean off of Levophed . If needed will consider inotropic support. Prognosis is guarded. He is in Sinus rhythm

## 2025-06-14 NOTE — CONSULT NOTE ADULT - SUBJECTIVE AND OBJECTIVE BOX
HPI:   Patient is a  70 year old male with past medical history of  COPD (on 2L home O2), DM 2  HFrEF 30%, CAD,  ischemic cardiomyopathy s/p CRT-D, HTN, paroxysmal A-fib (status post ablation 1/14/25) on amiodarone and  Eliquis who presented to ED w c/o LESLIE and b/l  leg swelling.  Patient said it started 2 days ago and felt like his previous heart failure exacerbations. Patient denied n/v/f/c; denied HA lightheadedness; denied palpitations cough CP (now resolved on admission).  (14 Jun 2025 02:21)        HPI-Cardiology   Pt with the above Hx and HPI, evaluated at bedside. Pt presented for eval of worsening LESLIE and B/L LE swelling. Pt became hypoxic and was intubated and transferred to ICU. Unable to obtain ROS as Pt sedated, intubated and mechanically ventilated.  Radiology tests and hospital records, were reviewed, as well as previous notes on this patient.          PAST MEDICAL & SURGICAL HISTORY  CHF (congestive heart failure)    Diabetes    CAD (coronary artery disease)    Chronic obstructive pulmonary disease (COPD)    HTN (hypertension)    Stented coronary artery    Dyslipidemia    GERD (gastroesophageal reflux disease)    Afib    CVA (cerebral vascular accident)    H/O heart artery stent    Heart valve replaced  aortic    History of Nissen fundoplication        ALLERGIES:  Bactrim (Unknown)  sulfa drugs (Unknown)      MEDICATIONS:  MEDICATIONS  (STANDING):  aMIOdarone    Tablet 200 milliGRAM(s) Oral daily  apixaban 5 milliGRAM(s) Oral every 12 hours  atorvastatin 40 milliGRAM(s) Oral at bedtime  azithromycin  IVPB 500 milliGRAM(s) IV Intermittent every 24 hours  cefTRIAXone   IVPB 1000 milliGRAM(s) IV Intermittent every 24 hours  chlorhexidine 2% Cloths 1 Application(s) Topical <User Schedule>  clopidogrel Tablet 75 milliGRAM(s) Oral daily  dapagliflozin 10 milliGRAM(s) Oral daily  dexMEDEtomidine Infusion 0.4 MICROgram(s)/kG/Hr (8.45 mL/Hr) IV Continuous <Continuous>  dextrose 5%. 1000 milliLiter(s) (100 mL/Hr) IV Continuous <Continuous>  dextrose 5%. 1000 milliLiter(s) (50 mL/Hr) IV Continuous <Continuous>  dextrose 50% Injectable 25 Gram(s) IV Push once  dextrose 50% Injectable 12.5 Gram(s) IV Push once  dextrose 50% Injectable 25 Gram(s) IV Push once  ezetimibe 10 milliGRAM(s) Oral daily  famotidine    Tablet 20 milliGRAM(s) Oral daily  fentaNYL   Infusion. 0.5 MICROgram(s)/kG/Hr (4.23 mL/Hr) IV Continuous <Continuous>  furosemide   Injectable 40 milliGRAM(s) IV Push every 6 hours  glucagon  Injectable 1 milliGRAM(s) IntraMuscular once  insulin glargine Injectable (LANTUS) 34 Unit(s) SubCutaneous every morning  insulin lispro (ADMELOG) corrective regimen sliding scale   SubCutaneous three times a day before meals  insulin lispro (ADMELOG) corrective regimen sliding scale   SubCutaneous at bedtime  metoprolol succinate ER 50 milliGRAM(s) Oral daily  norepinephrine Infusion 0.05 MICROgram(s)/kG/Min (7.92 mL/Hr) IV Continuous <Continuous>  propofol Infusion 5 MICROgram(s)/kG/Min (2.54 mL/Hr) IV Continuous <Continuous>  sacubitril 24 mG/valsartan 26 mG 1 Tablet(s) Oral two times a day  tiotropium 2.5 MICROgram(s) Inhaler 2 Puff(s) Inhalation daily    MEDICATIONS  (PRN):  acetaminophen     Tablet .. 650 milliGRAM(s) Oral every 6 hours PRN Temp greater or equal to 38C (100.4F), Mild Pain (1 - 3)  albuterol/ipratropium for Nebulization 3 milliLiter(s) Nebulizer every 6 hours PRN Shortness of Breath and/or Wheezing  aluminum hydroxide/magnesium hydroxide/simethicone Suspension 30 milliLiter(s) Oral every 4 hours PRN Dyspepsia  dextrose Oral Gel 15 Gram(s) Oral once PRN Blood Glucose LESS THAN 70 milliGRAM(s)/deciliter  guaiFENesin  milliGRAM(s) Oral every 12 hours PRN for cough  melatonin 5 milliGRAM(s) Oral at bedtime PRN Insomnia  ondansetron Injectable 4 milliGRAM(s) IV Push every 8 hours PRN Nausea and/or Vomiting      HOME MEDICATIONS:  Home Medications:  atorvastatin 40 mg oral tablet: 1 tab(s) orally once a day (25 May 2025 03:34)  clopidogrel 75 mg oral tablet: 1 tab(s) orally once a day (25 May 2025 03:34)  ezetimibe 10 mg oral tablet: 1 orally once a day (25 May 2025 03:37)  famotidine 40 mg oral tablet: 1 tab(s) orally once a day (25 May 2025 03:37)  fenofibrate 145 mg oral tablet: 1 orally once a day (25 May 2025 03:37)  melatonin 5 mg oral tablet: 1 tab(s) orally once a day (at bedtime) (25 May 2025 03:37)  metFORMIN 500 mg oral tablet: 1 tab(s) orally 2 times a day (25 May 2025 03:37)  metoprolol succinate 50 mg oral tablet, extended release: 1 tab(s) orally once a day (25 May 2025 03:37)  Soaanz 20 mg oral tablet: 1 tab(s) orally once a day (06 Jun 2025 15:53)  spironolactone 25 mg oral tablet: 1 tab(s) orally every 24 hours (25 May 2025 04:25)  Tresiba 100 units/mL subcutaneous solution: 34 unit(s) subcutaneous once a day (in the morning) (25 May 2025 04:25)      VITALS:   T(F): 97.6 (06-14 @ 08:00), Max: 97.6 (06-13 @ 22:53)  HR: 103 (06-14 @ 09:05) (94 - 115)  BP: 134/68 (06-14 @ 09:05) (134/68 - 171/90)  BP(mean): 96 (06-14 @ 09:05) (96 - 124)  RR: 26 (06-14 @ 09:05) (16 - 26)  SpO2: 98% (06-14 @ 09:05) (89% - 100%)        14 Jun 2025 07:01  -  14 Jun 2025 09:56  --------------------------------------------------------  IN: 0 mL / OUT: 145 mL / NET: -145 mL        REVIEW OF SYSTEMS:  See HPI          PHYSICAL EXAM:  NEURO: patient is intubated, mechanically ventilated and sedated  GEN: Not in acute distress  NECK: no thyroid enlargement, no JVD  LUNGS: Rales to auscultation bilaterally   CARDIOVASCULAR: S1/S2 present, RRR , no murmurs or rubs, no carotid bruits,  + PP bilaterally  ABD: Soft, non-tender, non-distended, +BS  EXT: B/L LE pitting edema 3+  SKIN: Intact        LABS:                        13.3   53.32 )-----------( 452      ( 14 Jun 2025 07:14 )             44.8     06-14    138  |  101  |  22[H]  ----------------------------<  407[H]  4.9   |  19  |  1.5    Ca    9.5      14 Jun 2025 07:14    TPro  6.3  /  Alb  3.5  /  TBili  0.4  /  DBili  x   /  AST  24  /  ALT  12  /  AlkPhos  85  06-14 06-14 Chol 213[H] LDL -- HDL 52 Trig 112, 04-19 Chol 209[H] LDL -- HDL 34[L] Trig 116      RADIOLOGY:  -CXR:  < from: Xray Chest 1 View-PORTABLE IMMEDIATE (Xray Chest 1 View-PORTABLE IMMEDIATE .) (06.14.25 @ 08:51) >  IMPRESSION:  1.  Support lines/tubes, as described.  2.  Stable bilateral pulmonary opacities.    --- End of Report ---      < end of copied text >            < from: TTE Echo Complete w/o Contrast w/ Doppler (04.20.25 @ 13:14) >    Summary:   1. LV Ejection Fraction by Beck's Method with a biplane EF of 30 %.   2. Severely decreased global left ventricular systolic function.   3. Endocardial visualization was enhanced with intravenous echo contrast.   4. Left atrial enlargement.   5. Mildly reduced RV systolic function.   6. Mild mitral valve regurgitation.   7. Thickening of the anterior and posterior mitral valve leaflets.   8. Moderate tricuspid regurgitation.   9. Bioprosthetic aortic valve in place. Peak/mean logadqqe70.9/15.3   mmHg.  10. Estimated pulmonary artery systolic pressure is 59.9 mmHg assuming a   right atrial pressure of 3 mmHg, which is consistent with moderate   pulmonary hypertension.  11. Likely PFO noted by color doppler wtih left to right shunt.    < end of copied text >        ECG:  < from: 12 Lead ECG (06.14.25 @ 04:54) >  Atrial-sensed ventricular-paced rhythm  Biventricular pacemaker detected  Abnormal ECG    < end of copied text >

## 2025-06-14 NOTE — ED ADULT NURSE NOTE - NSFALLHARMRISKINTERV_ED_ALL_ED
Assistance OOB with selected safe patient handling equipment if applicable/Assistance with ambulation/Communicate risk of Fall with Harm to all staff, patient, and family/Monitor gait and stability/Provide visual cue: red socks, yellow wristband, yellow gown, etc/Reinforce activity limits and safety measures with patient and family/Toileting schedule using arm’s reach rule for commode and bathroom/Bed in lowest position, wheels locked, appropriate side rails in place/Call bell, personal items and telephone in reach/Instruct patient to call for assistance before getting out of bed/chair/stretcher/Non-slip footwear applied when patient is off stretcher/[place] to call system/Physically safe environment - no spills, clutter or unnecessary equipment/Purposeful Proactive Rounding/Room/bathroom lighting operational, light cord in reach

## 2025-06-14 NOTE — ED ADULT NURSE REASSESSMENT NOTE - NS ED NURSE REASSESS COMMENT FT1
Attempted to administer medication as per MD order. PT refused. Unable to obtain repeat Trop level. Mulugeta Patel made aware.

## 2025-06-14 NOTE — ED PROVIDER NOTE - CLINICAL SUMMARY MEDICAL DECISION MAKING FREE TEXT BOX
pt with history as above presenting for cp, sob, b/l LE swelling. states he has not been taking his medications as prescribed. chronically ill appearing, non toxic. NCAT PERRLA EOMI neck supple non tender normal wob diminshed breath sounds b/l bases  abdomen s nt nd no rebound no guarding [id number] pitting edema to b/l le. labs ekg imaging reviewed. pt diuresed. will admit for further eval

## 2025-06-14 NOTE — CONSULT NOTE ADULT - ASSESSMENT
70 year old male with past medical history of  COPD (on 2L home O2), DM 2  HFrEF 30%, CAD,  ischemic cardiomyopathy s/p CRT-D, HTN, paroxysmal A-fib (status post ablation 1/14/25) on amiodarone and  Eliquis who presented to ED w c/o LESLIE and b/l  leg swelling.         Impression:  #SOB/LESLIE: Multifactorial  #HFrEF (EF 30%, s/p CRT-D)  #CAD s/p PCI to RCA and LAD  #NSTEMI/ Hx of frequent NSVT   #Paroxysmal AFib on Eliquis        Plan:  - Currently intubated, mechanically ventilated, and sedated  - Clinically appears volume overloaded   - Change to Bumex 2mg IVP BID. Monitor volume status closely  - Check BMP daily and monitor renal function. Maintain K >4.0, Mg >2.2    - Strict intake and output, maintain negative balance for now  - pBNP 2894. Repeat closer to discharge  - Restart home GDMT for HFrEF, when stable/medically appropriate   - For Afib - c/w AC, BB and amiodarone   - Pt needs ischemic eval, but has refused in the past

## 2025-06-14 NOTE — RAPID RESPONSE TEAM SUMMARY - NSSITUATIONBACKGROUNDRRT_GEN_ALL_CORE
rapid response was called for respiratory distress, and AMS.  Patient is hard to arouse,  RR in the 50's with accessory muscle and abdm muscle usage.  ABG shows respiratory acidosis.  SPO2 is 87% on 100% BIPAP.  IV lasix, and IV steriod ordered.  Previously patient is DNR/DNI.  attempted to call son, no response,  LM to call back.  called wife.  discussed the situation with wife.  options of continuing current treatment VS Intubation.  all risks, benefits, and alternatives of each options discussed with wife in details, explained to the Wife that he was previously DNR/DNI.  Wife opted for intubation.    discussed case with intensivist who recommended to transfer patient to ICU for intubation.  further care as per ICU team rapid response was called for respiratory distress, and AMS.  Patient is hard to arouse,  RR in the 50's with accessory muscle and abdm muscle usage.  ABG shows respiratory acidosis.  SPO2 is 87% on 100% BIPAP.  IV lasix, and IV steriod ordered.  Previously patient is DNR/DNI.  attempted to call son, no response,  LM to call back.  called wife.  discussed the situation with wife.  options of continuing current treatment VS Intubation.  all risks, benefits, and alternatives of each options discussed with wife in details, explained to the Wife that he was previously DNR/DNI.  Wife opted for intubation.    conversation was witnessed by pa  discussed case with intensivist who recommended to transfer patient to ICU for intubation.  further care as per ICU team

## 2025-06-14 NOTE — H&P ADULT - NSHPPHYSICALEXAM_GEN_ALL_CORE
VITALS:   T(C): 36.4 (06-14-25 @ 03:19), Max: 36.4 (06-13-25 @ 22:53)  HR: 101 (06-14-25 @ 03:19) (94 - 101)  BP: 161/106 (06-14-25 @ 03:19) (161/106 - 169/98)  RR: 20 (06-14-25 @ 03:19) (20 - 20)  SpO2: 94% (06-14-25 @ 03:19) (94% - 95%)    GENERAL: NAD, lying in bed comfortably  HEAD:  Atraumatic, normocephalic  EYES: EOMI, PERRLA, conjunctiva and sclera clear  ENT: Moist mucous membranes  NECK: Supple, no JVD  HEART: Regular rate and rhythm, no murmurs, rubs, or gallops  LUNGS:3 L NC + dry cough + diminished lung sounds   ABDOMEN: Soft, nontender, nondistended, +BS  EXTREMITIES: 2+ peripheral pulses bilaterally. + LE chronic edema   NERVOUS SYSTEM:  A&Ox3, no focal deficits   SKIN: No rashes or lesions

## 2025-06-14 NOTE — PATIENT PROFILE ADULT - FALL HARM RISK - HARM RISK INTERVENTIONS

## 2025-06-14 NOTE — ED PROVIDER NOTE - PHYSICAL EXAMINATION
CONSTITUTIONAL: Well-developed; well-nourished;   SKIN: warm, dry  HEAD: Normocephalic; atraumatic.  EYES: PERRL, EOMI, no conjunctival erythema  ENT: No nasal discharge; airway clear.  NECK: Supple; non tender.  CARD: Regular rate and rhythm.   RESP: Wheezing and crackles bilaterally   ABD: soft ntnd  EXT: Pitting edema bilaterally

## 2025-06-14 NOTE — H&P ADULT - ASSESSMENT
69 yo presents to ED w c/o chest pressure non radiating and LESLIE    #CHF exacerbation   #ischemic cardiomyopathy   #CAD   #L sided pacemaker   #HTN   #HLD   admit to telem   trop 113, follow trend   ECHO April 2025 (LVEF 30% w mod pulm HTN & severely decreased systolic fxn)   BNP 2894 -> lasix IV 40 mg daily   resume hm torsemide upon discharge   resume GDMT   plavix / statin   HF pathway   Mg > 2 & K + 4  cardiology consulted     #COPD   #chronic respiratory failure   CXR   tiotropium 2.5 mcg / duonebs   pulmonary consulted     #hyperkalemia   K 5.6 (hemolyzed)   repeat am lab   hold spironolactone for now     #Afib   resume eliquis 5mg BID  and amiodarone 200 mg       #DM 2   hold home tresiba   lantus 34 units am   SSI protocol   carb control     #noncompliance   w home meds   refusing troponin draws in ED     code status: DNR / DNI   gi ppx: pepcid   dvt ppx: eliquis  69 yo presents to ED w c/o chest pressure non radiating and LESLIE    #CHF exacerbation   #ischemic cardiomyopathy   #CAD   #L sided pacemaker   #HTN   #HLD   admit to telem   trop 113, follow trend   ECHO April 2025 (LVEF 30% w mod pulm HTN & severely decreased systolic fxn)   BNP 2894 -> lasix IV 40 mg daily   resume hm torsemide upon discharge   resume GDMT   plavix / statin   HF pathway   Mg > 2 & K + 4  cardiology consulted     # chronic leukocytosis   recently completed doxy & cefpodoxime     #COPD   #chronic respiratory failure   CXR   tiotropium 2.5 mcg / duonebs   pulmonary consulted     #hyperkalemia   K 5.6 (hemolyzed)   repeat am lab   hold spironolactone for now     #Afib   resume eliquis 5mg BID  and amiodarone 200 mg       #DM 2   hold home tresiba   lantus 34 units am   SSI protocol   carb control     #noncompliance   w home meds   refusing troponin draws in ED     code status: DNR / DNI   gi ppx: pepcid   dvt ppx: eliquis  71 yo presents to ED w c/o chest pressure non radiating and LESLIE    #CHF exacerbation   #ischemic cardiomyopathy   #CAD   #L sided pacemaker   #HTN   #HLD   admit to telem   trop 113, follow trend (no CP on exam)   ECHO April 2025 (LVEF 30% w mod pulm HTN & severely decreased systolic fxn)   BNP 2894 -> lasix IV 40 mg daily   resume hm torsemide upon discharge   resume GDMT   plavix / statin   HF pathway   Mg > 2 & K + 4  cardiology consulted     # chronic leukocytosis   recently completed doxy & cefpodoxime     #COPD   #chronic respiratory failure   CXR   tiotropium 2.5 mcg / duonebs   pulmonary consulted     #hyperkalemia   K 5.6 (hemolyzed)   repeat am lab   hold spironolactone for now     #Afib   resume eliquis 5mg BID  and amiodarone 200 mg     #DM 2   hold home tresiba   lantus 34 units am   SSI protocol   carb control     #noncompliance   w home meds   refusing troponin draws in ED     code status: DNR / DNI   gi ppx: pepcid   dvt ppx: eliquis  71 yo presents to ED w c/o chest pressure non radiating and LESLIE    #CHF exacerbation   #ischemic cardiomyopathy   #CAD   #L sided pacemaker   #HTN   #HLD   admit to telem   trop 113, follow trend (no CP on exam)   ECHO April 2025 (LVEF 30% w mod pulm HTN & severely decreased systolic fxn)   BNP 2894 -> lasix IV 40 mg daily   resume hm torsemide upon discharge   resume GDMT   plavix / statin   HF pathway   Mg > 2 & K + 4  cardiology consulted     # chronic leukocytosis   recently completed doxy & cefpodoxime     #COPD   #chronic respiratory failure   CXR   tiotropium 2.5 mcg / duonebs   pulmonary consulted     #hyperkalemia   K 5.6 (hemolyzed)   repeat am lab   hold spironolactone for now     #Afib   resume eliquis 5mg BID  and amiodarone 200 mg     #DM 2   hold home tresiba   lantus 34 units am   SSI protocol   carb control     #noncompliance   w home meds   refusing troponin draws in ED .  all risks, benefits, and alternatives discussed    code status: DNR / DNI   gi ppx: pepcid   dvt ppx: eliquis  69 yo presents to ED w c/o chest pressure non radiating and LESLIE    #CHF exacerbation   #ischemic cardiomyopathy   #CAD   #L sided pacemaker   #HTN   #HLD   admit to telem   trop 113, follow trend (no CP on exam)   ECHO April 2025 (LVEF 30% w mod pulm HTN & severely decreased systolic fxn)   BNP 2894 -> lasix IV 40 mg daily   resume hm torsemide upon discharge   resume GDMT   plavix / statin   HF pathway   Mg > 2 & K + 4  cardiology consulted     # chronic leukocytosis   #B/L opacities   recently completed doxy & cefpodoxime   CXR resulted increased b/l opacities & pleural effusion   inflammatory markers   start azithromycin + Rocephin   ID consulted     #COPD   #chronic respiratory failure   tiotropium 2.5 mcg / duonebs   pulmonary consulted     #hyperkalemia   K 5.6 (hemolyzed)   repeat am lab   hold spironolactone for now     #Afib   resume eliquis 5mg BID  and amiodarone 200 mg     #DM 2   hold home tresiba   lantus 34 units am   SSI protocol   carb control     #noncompliance   w home meds   refusing troponin draws in ED .  all risks, benefits, and alternatives discussed    code status: DNR / DNI   gi ppx: pepcid   dvt ppx: eliquis

## 2025-06-14 NOTE — CONSULT NOTE ADULT - SUBJECTIVE AND OBJECTIVE BOX
SERENAMONIQUE  70y, Male  Allergies    Bactrim (Unknown)  sulfa drugs (Unknown)    Intolerances    LOS      HPI  HPI:   Patient is a  70 year old male with past medical history of  COPD (on 2L home O2), DM 2  HFrEF 30%, CAD,  ischemic cardiomyopathy s/p CRT-D, HTN, paroxysmal A-fib (status post ablation 1/14/25) on amiodarone and  Eliquis who presented to ED w c/o LESLIE and b/l  leg swelling.  Patient said it started 2 days ago and felt like his previous heart failure exacerbations. Patient denied n/v/f/c; denied HA lightheadedness; denied palpitations cough CP (now resolved on admission).  (14 Jun 2025 02:21)      INFECTIOUS DISEASE HISTORY:  ID consulted for antimicrobial recommendations.     Prior hospital charts reviewed [Yes]  Primary team notes reviewed [Yes]  Other consultant notes reviewed [Yes]    REVIEW OF SYSTEMS:  CONSTITUTIONAL: No fever or chills  HEENT: No sore throat  RESPIRATORY: No cough, no shortness of breath  CARDIOVASCULAR: No chest pain or palpitations  GASTROINTESTINAL: No abdominal or epigastric pain  GENITOURINARY: No dysuria  NEUROLOGICAL: No headache/dizziness  MSK: No joint pain, erythema, or swelling; no back pain  SKIN: No itching, rashes  All other ROS negative except noted above    PAST MEDICAL & SURGICAL HISTORY:  CHF (congestive heart failure)      Diabetes      CAD (coronary artery disease)      Chronic obstructive pulmonary disease (COPD)      HTN (hypertension)      Stented coronary artery      Dyslipidemia      GERD (gastroesophageal reflux disease)      Afib      CVA (cerebral vascular accident)      H/O heart artery stent      Heart valve replaced  aortic      History of Nissen fundoplication    SOCIAL HISTORY:  - No recent travel    FAMILY HISTORY: No pertinent PMH for first degree relatives    ANTIMICROBIALS:  azithromycin  IVPB 500 every 24 hours  cefTRIAXone   IVPB 1000 every 24 hours      ANTIMICROBIALS (past 90 days):  MEDICATIONS  (STANDING):  azithromycin  IVPB   250 mL/Hr IV Intermittent (06-14-25 @ 12:36)    cefTRIAXone   IVPB   100 mL/Hr IV Intermittent (06-14-25 @ 12:36)        OTHER MEDS:   MEDICATIONS  (STANDING):  acetaminophen     Tablet .. 650 every 6 hours PRN  albuterol/ipratropium for Nebulization 3 every 6 hours PRN  aluminum hydroxide/magnesium hydroxide/simethicone Suspension 30 every 4 hours PRN  aMIOdarone    Tablet 200 daily  apixaban 5 every 12 hours  atorvastatin 40 at bedtime  clopidogrel Tablet 75 daily  dapagliflozin 10 daily  dexMEDEtomidine Infusion 0.4 <Continuous>  dextrose 50% Injectable 25 once  dextrose 50% Injectable 12.5 once  dextrose 50% Injectable 25 once  dextrose Oral Gel 15 once PRN  ezetimibe 10 daily  famotidine    Tablet 20 daily  fentaNYL   Infusion. 0.5 <Continuous>  furosemide   Injectable 40 every 6 hours  glucagon  Injectable 1 once  guaiFENesin  every 12 hours PRN  insulin glargine Injectable (LANTUS) 34 every morning  insulin lispro (ADMELOG) corrective regimen sliding scale  three times a day before meals  insulin lispro (ADMELOG) corrective regimen sliding scale  at bedtime  insulin regular Infusion 6 <Continuous>  melatonin 5 at bedtime PRN  metoprolol succinate ER 50 daily  norepinephrine Infusion 0.05 <Continuous>  ondansetron Injectable 4 every 8 hours PRN  propofol Infusion 5 <Continuous>  sacubitril 24 mG/valsartan 26 mG 1 two times a day  tiotropium 2.5 MICROgram(s) Inhaler 2 daily      VITALS:  Vital Signs Last 24 Hrs  T(F): 98.4 (06-14-25 @ 11:05), Max: 98.4 (06-14-25 @ 11:05)    Vital Signs Last 24 Hrs  HR: 93 (06-14-25 @ 11:05) (93 - 115)  BP: 95/56 (06-14-25 @ 11:05) (95/56 - 171/90)  RR: 17 (06-14-25 @ 11:05)  SpO2: 99% (06-14-25 @ 11:05) (89% - 100%)  Wt(kg): --    EXAM:  GENERAL: On MV  HEAD: No head lesions  NECK: Supple  CHEST/LUNG: Shallow breath sounds   HEART: S1 S2  ABDOMEN: Soft, nontender +Mcmahon  EXTREMITIES: No clubbing  MSK: +2 edema   SKIN: No rashes or lesions, no superficial thrombophlebitis    Labs:                        13.3   53.32 )-----------( 452      ( 14 Jun 2025 07:14 )             44.8     06-14    138  |  101  |  22[H]  ----------------------------<  407[H]  4.9   |  19  |  1.5    Ca    9.5      14 Jun 2025 07:14    TPro  6.3  /  Alb  3.5  /  TBili  0.4  /  DBili  x   /  AST  24  /  ALT  12  /  AlkPhos  85  06-14      WBC Trend:  WBC Count: 53.32 (06-14-25 @ 07:14)  WBC Count: 16.87 (06-14-25 @ 01:20)      Auto Neutrophil #: 7.72 K/uL (01-14-25 @ 05:40)  Auto Neutrophil #: 12.27 K/uL (01-11-25 @ 06:13)  Auto Neutrophil #: 15.48 K/uL (01-10-25 @ 05:31)  Auto Neutrophil #: 12.68 K/uL (01-09-25 @ 05:36)  Auto Neutrophil #: 9.11 K/uL (01-08-25 @ 06:22)      Creatine Trend:  Creatinine: 1.5 (06-14)  Creatinine: 1.3 (06-14)      Liver Biochemical Testing Trend:  Alanine Aminotransferase (ALT/SGPT): 12 (06-14)  Alanine Aminotransferase (ALT/SGPT): 14 (06-06)  Alanine Aminotransferase (ALT/SGPT): 17 (06-05)  Alanine Aminotransferase (ALT/SGPT): 8 (06-02)  Alanine Aminotransferase (ALT/SGPT): 12 (05-25)  Aspartate Aminotransferase (AST/SGOT): 24 (06-14-25 @ 01:20)  Aspartate Aminotransferase (AST/SGOT): 16 (06-06-25 @ 07:09)  Aspartate Aminotransferase (AST/SGOT): 18 (06-05-25 @ 07:40)  Aspartate Aminotransferase (AST/SGOT): 17 (06-02-25 @ 15:03)  Aspartate Aminotransferase (AST/SGOT): 25 (05-25-25 @ 00:27)  Bilirubin Total: 0.4 (06-14)  Bilirubin Total: 0.6 (06-06)  Bilirubin Total: 0.5 (06-05)  Bilirubin Total: 0.6 (06-02)  Bilirubin Total: 0.2 (05-25)      Trend LDH          Urinalysis Basic - ( 14 Jun 2025 07:14 )    Color: x / Appearance: x / SG: x / pH: x  Gluc: 407 mg/dL / Ketone: x  / Bili: x / Urobili: x   Blood: x / Protein: x / Nitrite: x   Leuk Esterase: x / RBC: x / WBC x   Sq Epi: x / Non Sq Epi: x / Bacteria: x        MICROBIOLOGY:    Male          HIV-1/2 Combo Result: Nonreact (01-04-25 @ 05:58)  COVID-19 PCR: NotDetec (05-15-25 @ 09:50)  Troponin T, High Sensitivity Result: 137 (06-14)  Troponin T, High Sensitivity Result: 121 (06-14)  Troponin T, High Sensitivity Result: 113 (06-14)    Blood Gas Arterial, Lactate: 2.5 (06-14 @ 07:22)  Blood Gas Venous - Lactate: 2.0 (06-14 @ 01:25)    A1C with Estimated Average Glucose Result: 9.7 % (04-19-25 @ 11:44)  A1C with Estimated Average Glucose Result: 9.7 % (03-30-25 @ 06:05)  A1C with Estimated Average Glucose Result: 9.9 % (12-28-24 @ 07:18)      INFLAMMATORY MARKERS      RADIOLOGY & ADDITIONAL TESTS:  I have personally reviewed the imagings.  CXR  Xray Chest 1 View AP/PA:   ACC: [id number] EXAM:  XR CHEST 1 VIEW   ORDERED BY: RADHA VICTORIA     PROCEDURE DATE:  06/13/2025          INTERPRETATION:  CLINICAL HISTORY: Shortness of breath    COMPARISON: Frontal chest June 5, 2025    TECHNIQUE: Portable frontal chest radiograph.    FINDINGS:    Support devices: Stable position left ICD.    Cardiac/mediastinum/hilum: Cardiomegaly, thoracic aortic and coronary   artery calcification, status post median sternotomy, aortic valve   replacement..    Lung parenchyma/Pleura: Bilateral opacities/pleural effusions    Skeleton/soft tissues: Stable bony structures. Epigastric surgical clips.      IMPRESSION:    Bilateral opacities/pleural effusion, increased. Redemonstration   cardiomegaly.    --- End of Report ---            TORI CHRISTIANSEN MD; Attending Radiologist  This document has been electronically signed. Jun 14 2025  5:35AM (06-13-25 @ 23:45)      CT    < from: Xray Chest 1 View AP/PA (06.13.25 @ 23:45) >    IMPRESSION:    Bilateral opacities/pleural effusion, increased. Redemonstration   cardiomegaly.    --- End of Report ---    < end of copied text >  < from: US Renal (06.04.25 @ 12:25) >  INTERPRETATION:  CLINICAL INFORMATION: twan.    COMPARISON: CT dated 1/3/2025    TECHNIQUE: Sonography of the kidneys.    FINDINGS:  Right kidney: 11.2 cm. No hydronephrosis.    Left kidney: 10.4 cm. No hydronephrosis.    IMPRESSION:  No hydronephrosis.        --- End of Report ---    < end of copied text >  < from: CT Chest No Cont (06.02.25 @ 20:51) >  INTERPRETATION:  Indication::Worsening pneumonia. Rule out effusion.      Shoulder joint are was performed from lung apices to adrenal glands.  Sagittal and coronal reformatted images were generated.    Comparison: CT chest-3/28/2025  Chest x-ray 6/2/2025      Findings:    Tubes/Lines/Devices : Left ICD.  Mediastinum/hilum: .No adenopathy or masses.  Chest Wall/ Breasts: battery pack left chestwall. No adenopathy.   Heart/Great Vessels:Poststernotomy. Cardiomegaly. No pericardial   effusions. Extensive coronary artery and aortic calcifications.    Abdomen: Visualized upper abdominal organs are unremarkable.  Bones and soft tissues: Degenerative changes thoracic spine  Lungs, Pleura, and Airways: Patent central airways. Large bilateral   pleural effusions with lower lobe compressive atelectasis. Patchy   groundglass opacities and consolidation of the upper lobes. No   pneumothorax  Pulmonary nodules:  No suspicious pulmonary nodules.  IMPRESSION:    Large bilateral pleural effusions with lower lobe compressive atelectasis.     Patchy groundglass opacities and consolidation of the upper lobes. No   pneumothorax  Extensive coronary arteries and aortic calcifications    --- End of Report ---      < end of copied text >    CARDIOLOGY TESTING  12 Lead ECG:   Ventricular Rate 115 BPM    Atrial Rate 115 BPM    P-R Interval 112 ms    QRS Duration 138 ms    Q-T Interval 364 ms    QTC Calculation(Bazett) 503 ms    P Axis 31 degrees    R Axis 244 degrees    T Axis 53 degrees    Diagnosis Line Atrial-sensed ventricular-paced rhythm  Biventricular pacemaker detected  Abnormal ECG (06-14-25 @ 04:54)  12 Lead ECG:   Ventricular Rate 115 BPM    Atrial Rate 115 BPM    QRS Duration 138 ms    Q-T Interval 354 ms    QTC Calculation(Bazett) 489 ms    P Axis -3 degrees    R Axis 261 degrees    T Axis 50 degrees    Diagnosis Line Ventricular-paced rhythm  Biventricular pacemaker detected  Abnormal ECG (06-14-25 @ 04:54)

## 2025-06-14 NOTE — PATIENT PROFILE ADULT - PATIENT'S GENDER IDENTITY
[FreeTextEntry1] : Patient is a 46 year-old, left-hand-dominant male who presents with pain in the volar aspect of right wrist for about 3 months. \par No recent trauma or injury. Patient reports he does sometimes lift his 6 year old daughter, mostly with the right hand. He reports applying pressure to right wrist, doing push-ups exacerbates the pain. He does not take any medication for pain. \par No recent x-rays of wrist. \par \par Patient also reports intermittent stiffness in right small finger, which he experiences when he types. He presents today for re-evaluation.\par \par History of right wrist De Quervain's 5 years ago, treated with a brace. \par Takes Xarelto and is having an ablation procedure soon. Has a Hx of A-fib.\par 
Male

## 2025-06-14 NOTE — ED PROVIDER NOTE - OBJECTIVE STATEMENT
Patient is a  70 year old male with past medical history of  COPD (on 2L home O2), diabetes HFrEF 43%, ischemic cardiomyopathy s/p CRT-D, (Entresto, spironolactone 25, torsemide  20mg AVR, hypertension, paroxysmal A-fib (status post ablation 1/14/25) on amiodarone Eliquis, CAD Presents today for dyspnea on exertion and leg swelling.  Patient said it started 2 days ago and felt like his previous heart failure exacerbations.  Patient denies any sick contacts fever chills abdominal pain patient said he had accompanying intermittent chest pressure that was sharp in nature nonradiating that is since resolved

## 2025-06-14 NOTE — CONSULT NOTE ADULT - SUBJECTIVE AND OBJECTIVE BOX
Patient is a 70y old  Male who presents with a chief complaint of SOB (14 Jun 2025 02:21)      HPI:   Patient is a  70 year old male with past medical history of  COPD (on 2L home O2), DM 2  HFrEF 30%, CAD,  ischemic cardiomyopathy s/p CRT-D, HTN, paroxysmal A-fib (status post ablation 1/14/25) on amiodarone and  Eliquis who presented to ED w c/o LESLIE and b/l  leg swelling.  Patient said it started 2 days ago and felt like his previous heart failure exacerbations. Patient denied n/v/f/c; denied HA lightheadedness; denied palpitations cough CP (now resolved on admission).  (14 Jun 2025 02:21)      PAST MEDICAL & SURGICAL HISTORY:  CHF (congestive heart failure)      Diabetes      CAD (coronary artery disease)      Chronic obstructive pulmonary disease (COPD)      HTN (hypertension)      Stented coronary artery      Dyslipidemia      GERD (gastroesophageal reflux disease)      Afib      CVA (cerebral vascular accident)      H/O heart artery stent      Heart valve replaced  aortic      History of Nissen fundoplication          SOCIAL HX:   Smoking                         ETOH                            Other    FAMILY HISTORY:  .  No cardiovascular or pulmonary family history     REVIEW OF SYSTEMS:    All ROS are negative exept per HPI       Allergies    Bactrim (Unknown)  sulfa drugs (Unknown)    Intolerances          PHYSICAL EXAM  Vital Signs Last 24 Hrs  T(C): 36.4 (14 Jun 2025 05:14), Max: 36.4 (13 Jun 2025 22:53)  T(F): 97.5 (14 Jun 2025 05:14), Max: 97.6 (13 Jun 2025 22:53)  HR: 110 (14 Jun 2025 05:14) (94 - 110)  BP: 159/75 (14 Jun 2025 05:14) (159/75 - 169/98)  BP(mean): --  RR: 23 (14 Jun 2025 05:14) (20 - 23)  SpO2: 89% (14 Jun 2025 05:14) (89% - 95%)    Parameters below as of 14 Jun 2025 03:19  Patient On (Oxygen Delivery Method): nasal cannula  O2 Flow (L/min): 5      CONSTITUTIONAL:  Well nourished.  NAD    ENT:   Airway patent,   No thrush    EYES:   Clear bilaterally,   pupils equal,   round and reactive to light.    CARDIAC:   Normal rate,   regular rhythm.    no edema      RESPIRATORY:   No wheezing   Normal chest expansion  Not tachypneic,  No use of accessory muscles    GASTROINTESTINAL:  Abdomen soft, non-tender,   No guarding,   Positive BS    MUSCULOSKELETAL:   Range of motion is not limited,  No clubbing, cyanosis    NEUROLOGICAL:   Alert and oriented   No motor deficits.    SKIN:   Skin normal color for race,   No evidence of rash.      HEME LYMPH:   No cervical  lymphadenopathy.  no inguinal lymphadenopathy          LABS:                          12.0   16.87 )-----------( 342      ( 14 Jun 2025 01:20 )             39.5                                               06-14    135  |  101  |  22[H]  ----------------------------<  334[H]  5.6[H]   |  22  |  1.3    Ca    8.8      14 Jun 2025 01:20    TPro  6.3  /  Alb  3.5  /  TBili  0.4  /  DBili  x   /  AST  24  /  ALT  12  /  AlkPhos  85  06-14                                             Urinalysis Basic - ( 14 Jun 2025 01:20 )    Color: x / Appearance: x / SG: x / pH: x  Gluc: 334 mg/dL / Ketone: x  / Bili: x / Urobili: x   Blood: x / Protein: x / Nitrite: x   Leuk Esterase: x / RBC: x / WBC x   Sq Epi: x / Non Sq Epi: x / Bacteria: x                                                  LIVER FUNCTIONS - ( 14 Jun 2025 01:20 )  Alb: 3.5 g/dL / Pro: 6.3 g/dL / ALK PHOS: 85 U/L / ALT: 12 U/L / AST: 24 U/L / GGT: x                                                                                            ABG - ( 14 Jun 2025 07:22 )  pH, Arterial: 7.29  pH, Blood: x     /  pCO2: 44    /  pO2: 56    / HCO3: 21    / Base Excess: -5.3  /  SaO2: 86.9                MEDICATIONS  (STANDING):  aMIOdarone    Tablet 200 milliGRAM(s) Oral daily  apixaban 5 milliGRAM(s) Oral every 12 hours  atorvastatin 40 milliGRAM(s) Oral at bedtime  azithromycin  IVPB 500 milliGRAM(s) IV Intermittent every 24 hours  cefTRIAXone   IVPB 1000 milliGRAM(s) IV Intermittent every 24 hours  chlorhexidine 2% Cloths 1 Application(s) Topical <User Schedule>  clopidogrel Tablet 75 milliGRAM(s) Oral daily  dapagliflozin 10 milliGRAM(s) Oral daily  dextrose 5%. 1000 milliLiter(s) (100 mL/Hr) IV Continuous <Continuous>  dextrose 5%. 1000 milliLiter(s) (50 mL/Hr) IV Continuous <Continuous>  dextrose 50% Injectable 25 Gram(s) IV Push once  dextrose 50% Injectable 12.5 Gram(s) IV Push once  dextrose 50% Injectable 25 Gram(s) IV Push once  ezetimibe 10 milliGRAM(s) Oral daily  famotidine    Tablet 20 milliGRAM(s) Oral daily  furosemide   Injectable 40 milliGRAM(s) IV Push daily  glucagon  Injectable 1 milliGRAM(s) IntraMuscular once  insulin glargine Injectable (LANTUS) 34 Unit(s) SubCutaneous every morning  insulin lispro (ADMELOG) corrective regimen sliding scale   SubCutaneous three times a day before meals  insulin lispro (ADMELOG) corrective regimen sliding scale   SubCutaneous at bedtime  metoprolol succinate ER 50 milliGRAM(s) Oral daily  sacubitril 24 mG/valsartan 26 mG 1 Tablet(s) Oral two times a day  tiotropium 2.5 MICROgram(s) Inhaler 2 Puff(s) Inhalation daily    MEDICATIONS  (PRN):  acetaminophen     Tablet .. 650 milliGRAM(s) Oral every 6 hours PRN Temp greater or equal to 38C (100.4F), Mild Pain (1 - 3)  albuterol/ipratropium for Nebulization 3 milliLiter(s) Nebulizer every 6 hours PRN Shortness of Breath and/or Wheezing  aluminum hydroxide/magnesium hydroxide/simethicone Suspension 30 milliLiter(s) Oral every 4 hours PRN Dyspepsia  dextrose Oral Gel 15 Gram(s) Oral once PRN Blood Glucose LESS THAN 70 milliGRAM(s)/deciliter  guaiFENesin  milliGRAM(s) Oral every 12 hours PRN for cough  melatonin 5 milliGRAM(s) Oral at bedtime PRN Insomnia  ondansetron Injectable 4 milliGRAM(s) IV Push every 8 hours PRN Nausea and/or Vomiting      X-Rays reviewed:    CXR interpreted by me:  Chest x-ray performed 6/14 images and radiologist read reviewed, compared with prior films from 6/13 and 6/5, by my read demonstrating stable small bilateral pleural effusions, interval development of severe patchy bilateral opacities consistent with acute pulmonary edema.

## 2025-06-14 NOTE — H&P ADULT - NSHPLABSRESULTS_GEN_ALL_CORE
12.0   16.87 )-----------( 342      ( 14 Jun 2025 01:20 )             39.5       06-14    135  |  101  |  22[H]  ----------------------------<  334[H]  5.6[H]   |  22  |  1.3    Ca    8.8      14 Jun 2025 01:20    TPro  6.3  /  Alb  3.5  /  TBili  0.4  /  DBili  x   /  AST  24  /  ALT  12  /  AlkPhos  85  06-14                  Urinalysis Basic - ( 14 Jun 2025 01:20 )    Color: x / Appearance: x / SG: x / pH: x  Gluc: 334 mg/dL / Ketone: x  / Bili: x / Urobili: x   Blood: x / Protein: x / Nitrite: x   Leuk Esterase: x / RBC: x / WBC x   Sq Epi: x / Non Sq Epi: x / Bacteria: x            Lactate Trend                  Culture Results:   No growth at 5 days (06-02 @ 18:05)  Culture Results:   No growth at 5 days (06-02 @ 18:05)

## 2025-06-14 NOTE — CONSULT NOTE ADULT - ASSESSMENT
1 Acute Hypoxic respiratory failure  - Upgrade to the ICU  - s/p intubation and placement on mechanical ventilation by the primary team upon speaking with the pt's wife who reversed the pt's DNR/DNI status  - Palliative care consult to addressed goals of care  - Continue with mechanical ventilation to maintain SpO2 greater than or equal to 92%  - Lasix 40mg IV q6 hours daily  - Start Fentanyl and Propofol  - Hold IVF maintenance  - Monitor electrolytes to maintain K o4 4, Mg of 2, Phos of 3  - Obtain full set of labs including CBC, BMP, Coagulation panel, Lactate, Procalcitonin, BCx x2, UA  - Obtain TSH, A1c, Cortisol level  - Follow up repeat ABG  - Follow up CXR results  - Echocardiogram  - Monitor I/Os 1 Acute Hypoxic respiratory failure  2 Acute encephalopathy  3. HFrEF  - Upgrade to the ICU  - s/p intubation and placement on mechanical ventilation by the primary team upon speaking with the pt's wife who reversed the pt's DNR/DNI status  - Palliative care consult to addressed goals of care  - Continue with mechanical ventilation to maintain SpO2 greater than or equal to 92%  - Lasix 40mg IV q6 hours daily  - Start Fentanyl and Propofol, Start SAT/SBT when appropriate  - Hold IVF maintenance  - Monitor electrolytes to maintain K o4 4, Mg of 2, Phos of 3  - Obtain full set of labs including CBC, BMP, Coagulation panel, Lactate, Procalcitonin, BCx x2, UA  - Obtain TSH, A1c, Cortisol level  - Follow up repeat ABG  - Follow up CXR results  - Echocardiogram  - Monitor I/Os

## 2025-06-14 NOTE — CONSULT NOTE ADULT - ASSESSMENT
ASSESSMENT  This is a 70 year old male with past medical history of  COPD (on 2L home O2), DM 2  HFrEF 30%, CAD,  ischemic cardiomyopathy s/p CRT-D, HTN, paroxysmal A-fib (status post ablation 1/14/25) on amiodarone and  Eliquis who presented to ED w c/o LESLIE and b/l  leg swelling.    IMPRESSION  #Chronic Leukocytosis- Now Worsened- Possible leukemoid reaction  #Acute on chronic hypoxic respiratory failure  #Heart failure with reduced EF, Acute on chronic exacerbation  #Large Bilateral pleural effusions/Pulmonary edema   #Can not rule out PNA vs Aspiration vs Amiodarone induced pneumonitis?  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  #Obesity BMI (kg/m2): 32.5, 28.7, 29.6, 30.1    Recently Enterovirus Positive Discharged on PO steroids Vantin+Doxy    Covid/Flu/RSV negative     RECOMMENDATIONS  -Obtain blood cultures.  -Sputum cultures.  -Cardiology follow up.   -D/C other abx.  -IV Vanc. Dosing as per pharmacy.  -IV Cefepime 2 gram q 12 hours.   -Off loading to prevent pressure sores and preventive measures to avoid aspiration. TOV. GOC. Recurrent admissions expected.     If any questions, please send a message or call on [email or web address] Teams  Please continue to update ID with any pertinent new laboratory or radiographic findings.    Benigno Pond M.D  Infectious Diseases Attending/   [employer] at [facility]   ASSESSMENT  This is a 70 year old male with past medical history of  COPD (on 2L home O2), DM 2  HFrEF 30%, CAD,  ischemic cardiomyopathy s/p CRT-D, HTN, paroxysmal A-fib (status post ablation 1/14/25) on amiodarone and  Eliquis who presented to ED w c/o LESLIE and b/l  leg swelling.    IMPRESSION  #Chronic Leukocytosis- Now Worsened- Possible leukemoid reaction  #Acute on chronic hypoxic respiratory failure  #Heart failure with reduced EF, Acute on chronic exacerbation  #Large Bilateral pleural effusions/Pulmonary edema   #Can not rule out PNA vs Aspiration vs Amiodarone induced pneumonitis?  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  #Obesity BMI (kg/m2): 32.5, 28.7, 29.6, 30.1    Recently Enterovirus Positive Discharged on PO steroids Vantin+Doxy    Covid/Flu/RSV negative     RECOMMENDATIONS  -Obtain blood cultures.  -Sputum cultures.  -Cardiology follow up.   -Should be considered for thoracentesis.  -D/C other abx.  -IV Vanc. Dosing as per pharmacy.  -IV Cefepime 2 gram q 12 hours.   -Off loading to prevent pressure sores and preventive measures to avoid aspiration. TOV. GOC. Recurrent admissions expected.     If any questions, please send a message or call on [email or web address] Teams  Please continue to update ID with any pertinent new laboratory or radiographic findings.    Benigno Pond M.D  Infectious Diseases Attending/   [employer] at [facility]

## 2025-06-14 NOTE — CONSULT NOTE ADULT - SUBJECTIVE AND OBJECTIVE BOX
Pt is a 70 male who was found to be in acute respiratory distress with altered mental state in which primary team decided to intubate and place on mechanical ventilation. Of note, the pt was previous DRN/DNI but upon the primary team, the pt's wife reversed the DNR/DNI status and made the pt full code. Critical care was consulted and pt was upgrade to the ICU.    ROS could not be conducted due to mental acuity    T(C): 36.4 (06-14-25 @ 05:14), Max: 36.4 (06-13-25 @ 22:53)  HR: 110 (06-14-25 @ 05:14) (94 - 110)  BP: 159/75 (06-14-25 @ 05:14) (159/75 - 169/98)  RR: 23 (06-14-25 @ 05:14) (20 - 23)  SpO2: 89% (06-14-25 @ 05:14) (89% - 95%)    CONSTITUTIONAL: Acute distress  EYES:  No conjunctival or scleral injection, non-icteric  ENMT: Oral mucosa with moist membranes.  RESP: respiratory distress, use of accessory muscles  CV: tachycardic  GI: Soft, obses  LYMPH: No cervical LAD or tenderness  MSK:  No digital clubbing or cyanosis  SKIN: No rashes or ulcers noted  NEURO: Altered                          12.0   16.87 )-----------( 342      ( 14 Jun 2025 01:20 )             39.5   06-14    135  |  101  |  22[H]  ----------------------------<  334[H]  5.6[H]   |  22  |  1.3    Ca    8.8      14 Jun 2025 01:20    TPro  6.3  /  Alb  3.5  /  TBili  0.4  /  DBili  x   /  AST  24  /  ALT  12  /  AlkPhos  85  06-14   Pt is a 70 male who was found to be in acute respiratory distress with altered mental state in which primary team decided to intubate and place on mechanical ventilation. Of note, the pt was previous DRN/DNI but upon the primary team's discussion with the pt's wife, the pt's wife reversed the DNR/DNI status and made the pt full code. Critical care was consulted and pt was upgrade to the ICU.    ROS could not be conducted due to mental acuity    T(C): 36.4 (06-14-25 @ 05:14), Max: 36.4 (06-13-25 @ 22:53)  HR: 110 (06-14-25 @ 05:14) (94 - 110)  BP: 159/75 (06-14-25 @ 05:14) (159/75 - 169/98)  RR: 23 (06-14-25 @ 05:14) (20 - 23)  SpO2: 89% (06-14-25 @ 05:14) (89% - 95%)    CONSTITUTIONAL: Acute distress  EYES:  No conjunctival or scleral injection, non-icteric  ENMT: Oral mucosa with moist membranes.  RESP: respiratory distress, use of accessory muscles  CV: tachycardic  GI: Soft, obses  LYMPH: No cervical LAD or tenderness  MSK:  No digital clubbing or cyanosis  SKIN: No rashes or ulcers noted  NEURO: Altered                          12.0   16.87 )-----------( 342      ( 14 Jun 2025 01:20 )             39.5   06-14    135  |  101  |  22[H]  ----------------------------<  334[H]  5.6[H]   |  22  |  1.3    Ca    8.8      14 Jun 2025 01:20    TPro  6.3  /  Alb  3.5  /  TBili  0.4  /  DBili  x   /  AST  24  /  ALT  12  /  AlkPhos  85  06-14   Pt is a 70 male who was found to be in acute respiratory distress with altered mental state in which primary team decided to intubate and place on mechanical ventilation. Of note, the pt was previous DRN/DNI but upon the primary team's discussion with the pt's wife, the pt's wife reversed the DNR/DNI status and made the pt full code. Critical care was consulted and pt was upgraded to the ICU.    ROS could not be conducted due to mental acuity    T(C): 36.4 (06-14-25 @ 05:14), Max: 36.4 (06-13-25 @ 22:53)  HR: 110 (06-14-25 @ 05:14) (94 - 110)  BP: 159/75 (06-14-25 @ 05:14) (159/75 - 169/98)  RR: 23 (06-14-25 @ 05:14) (20 - 23)  SpO2: 89% (06-14-25 @ 05:14) (89% - 95%)    CONSTITUTIONAL: Acute distress  EYES:  No conjunctival or scleral injection, non-icteric  ENMT: Oral mucosa with moist membranes.  RESP: respiratory distress, use of accessory muscles  CV: tachycardic  GI: Soft, obses  LYMPH: No cervical LAD or tenderness  MSK:  No digital clubbing or cyanosis  SKIN: No rashes or ulcers noted  NEURO: Altered                          12.0   16.87 )-----------( 342      ( 14 Jun 2025 01:20 )             39.5   06-14    135  |  101  |  22[H]  ----------------------------<  334[H]  5.6[H]   |  22  |  1.3    Ca    8.8      14 Jun 2025 01:20    TPro  6.3  /  Alb  3.5  /  TBili  0.4  /  DBili  x   /  AST  24  /  ALT  12  /  AlkPhos  85  06-14

## 2025-06-14 NOTE — PATIENT PROFILE ADULT - SAFE PLACE TO LIVE
"Patient has Abnormal Magnesium: hypomagnesemia. Will continue to monitor electrolytes closely. Will replace the affected electrolytes and repeat labs to be done after interventions completed. The patient's magnesium results have been reviewed and are listed below.  No results for input(s): "MG" in the last 24 hours.     " no

## 2025-06-15 NOTE — PROGRESS NOTE ADULT - NS ATTEND AMEND GEN_ALL_CORE FT
The patient was seen and examined . He remains sedated . Edema is less and CXR although read as unchanged is imp[roved from admission ,pleural effusions have resolved. Levophed is being weaned . Would decrease Bumex, wean off of pressors , can restart GDMT once stable and off of Levophed . Anticipate attempt at extubation tomorrow . The patient was seen and examined . He remains sedated . Edema is less and CXR although read as unchanged is imp[roved from admission ,pleural effusions have resolved. Levophed is being weaned . Would decrease Bumex, wean off of pressors , can restart GDMT once stable and off of Levophed . Anticipate attempt at extubation tomorrow . This is acute on chronic HFrEF.

## 2025-06-15 NOTE — PROGRESS NOTE ADULT - SUBJECTIVE AND OBJECTIVE BOX
Patient is a 70y old  Male who presents with a chief complaint of SOB (14 Jun 2025 12:50)        Over Night Events:    Intubated yesterday after wife rescinded DNR/DNI order  Overnight intermittently restless, requiring increased sedation doses  Febrile this AM  Still slightly net positive yesterday, 500cc    ROS:     Unable to assess ROS: patient intubated.        PHYSICAL EXAM    ICU Vital Signs Last 24 Hrs  T(C): 38.1 (15 Kleber 2025 06:00), Max: 38.1 (15 Kleber 2025 05:30)  T(F): 100.6 (15 Kleber 2025 06:00), Max: 100.6 (15 Kleber 2025 05:30)  HR: 67 (15 Kleber 2025 06:30) (64 - 115)  BP: 97/54 (15 Kleber 2025 06:30) (76/51 - 171/90)  BP(mean): 72 (15 Kleber 2025 06:30) (60 - 124)  ABP: --  ABP(mean): --  RR: 26 (15 Kleber 2025 06:30) (16 - 30)  SpO2: 100% (15 Kleber 2025 06:30) (93% - 100%)    O2 Parameters below as of 15 Kleber 2025 03:00  Patient On (Oxygen Delivery Method): ventilator    O2 Concentration (%): 40        Constitutional: no acute distress, well nourished well developed  Neuro: moving all 4 limbs spontaneously.  Sedated.  HEENT: NCAT, anicteric, ETT in place  Neck: no visible lymphadenopathy or goiter  Pulm: synchronous with ventilator, coarse bilateral breath sounds anteriorly  Cardiac: extremities appear pink and well-perfused.  regular rhythm and rate, no murmur detected  Abdomen: non-distended  Extremities: +3 b/l dependent edema  Skin: no visible rashes or lesions        06-14-25 @ 07:01  -  06-15-25 @ 06:49  --------------------------------------------------------  IN:    FentaNYL: 298.3 mL    Insulin: 6 mL    Insulin: 24 mL    Insulin: 18 mL    Insulin: 7 mL    IV PiggyBack: 600 mL    Norepinephrine: 334.5 mL    Propofol: 112 mL  Total IN: 1399.8 mL    OUT:    Indwelling Catheter - Urethral (mL): 630 mL    Voided (mL): 535 mL  Total OUT: 1165 mL    Total NET: 234.8 mL          LABS:                            12.5   40.88 )-----------( 387      ( 14 Jun 2025 19:24 )             43.2                                               06-14    140  |  105  |  28[H]  ----------------------------<  175[H]  5.3[H]   |  18  |  1.8[H]    Ca    9.2      14 Jun 2025 19:24  Phos  3.6     06-14  Mg     2.4     06-14    TPro  6.5  /  Alb  3.5  /  TBili  0.4  /  DBili  x   /  AST  20  /  ALT  11  /  AlkPhos  95  06-14                                             Urinalysis Basic - ( 14 Jun 2025 19:24 )    Color: x / Appearance: x / SG: x / pH: x  Gluc: 175 mg/dL / Ketone: x  / Bili: x / Urobili: x   Blood: x / Protein: x / Nitrite: x   Leuk Esterase: x / RBC: x / WBC x   Sq Epi: x / Non Sq Epi: x / Bacteria: x                                                  LIVER FUNCTIONS - ( 14 Jun 2025 19:24 )  Alb: 3.5 g/dL / Pro: 6.5 g/dL / ALK PHOS: 95 U/L / ALT: 11 U/L / AST: 20 U/L / GGT: x                                                                                               Mode: AC/ CMV (Assist Control/ Continuous Mandatory Ventilation)  RR (machine): 26  TV (machine): 450  FiO2: 40  PEEP: 8  ITime: 1  MAP: 15  PIP: 26                                      ABG - ( 15 Kleber 2025 04:30 )  pH, Arterial: 7.41  pH, Blood: x     /  pCO2: 34    /  pO2: 180   / HCO3: 22    / Base Excess: -2.4  /  SaO2: 99.5                MEDICATIONS  (STANDING):  aMIOdarone    Tablet 200 milliGRAM(s) Oral daily  apixaban 5 milliGRAM(s) Oral every 12 hours  atorvastatin 40 milliGRAM(s) Oral at bedtime  cefepime   IVPB 2000 milliGRAM(s) IV Intermittent every 12 hours  chlorhexidine 0.12% Liquid 15 milliLiter(s) Oral Mucosa every 12 hours  chlorhexidine 2% Cloths 1 Application(s) Topical <User Schedule>  clopidogrel Tablet 75 milliGRAM(s) Oral daily  dapagliflozin 10 milliGRAM(s) Oral daily  dexMEDEtomidine Infusion 0.4 MICROgram(s)/kG/Hr (8.45 mL/Hr) IV Continuous <Continuous>  dextrose 5%. 1000 milliLiter(s) (100 mL/Hr) IV Continuous <Continuous>  dextrose 5%. 1000 milliLiter(s) (50 mL/Hr) IV Continuous <Continuous>  dextrose 50% Injectable 25 Gram(s) IV Push once  dextrose 50% Injectable 12.5 Gram(s) IV Push once  dextrose 50% Injectable 25 Gram(s) IV Push once  ezetimibe 10 milliGRAM(s) Oral daily  famotidine    Tablet 20 milliGRAM(s) Oral daily  fentaNYL   Infusion. 0.5 MICROgram(s)/kG/Hr (4.23 mL/Hr) IV Continuous <Continuous>  furosemide   Injectable 40 milliGRAM(s) IV Push every 6 hours  glucagon  Injectable 1 milliGRAM(s) IntraMuscular once  insulin glargine Injectable (LANTUS) 34 Unit(s) SubCutaneous every morning  insulin lispro (ADMELOG) corrective regimen sliding scale   SubCutaneous three times a day before meals  metoprolol succinate ER 50 milliGRAM(s) Oral daily  norepinephrine Infusion 0.05 MICROgram(s)/kG/Min (7.92 mL/Hr) IV Continuous <Continuous>  propofol Infusion 5 MICROgram(s)/kG/Min (2.54 mL/Hr) IV Continuous <Continuous>  sacubitril 24 mG/valsartan 26 mG 1 Tablet(s) Oral two times a day  tiotropium 2.5 MICROgram(s) Inhaler 2 Puff(s) Inhalation daily    MEDICATIONS  (PRN):  acetaminophen     Tablet .. 650 milliGRAM(s) Oral every 6 hours PRN Temp greater or equal to 38C (100.4F), Mild Pain (1 - 3)  albuterol/ipratropium for Nebulization 3 milliLiter(s) Nebulizer every 6 hours PRN Shortness of Breath and/or Wheezing  aluminum hydroxide/magnesium hydroxide/simethicone Suspension 30 milliLiter(s) Oral every 4 hours PRN Dyspepsia  dextrose Oral Gel 15 Gram(s) Oral once PRN Blood Glucose LESS THAN 70 milliGRAM(s)/deciliter  guaiFENesin  milliGRAM(s) Oral every 12 hours PRN for cough  melatonin 5 milliGRAM(s) Oral at bedtime PRN Insomnia  ondansetron Injectable 4 milliGRAM(s) IV Push every 8 hours PRN Nausea and/or Vomiting      New X-rays reviewed:       ECHO reviewed    CXR interpreted by me:    6/14  images and radiologist read reviewed, by my read demonstrating patchy bilateral opacities consistent with pulmonary edema.  Interval ETT placement.

## 2025-06-15 NOTE — PROGRESS NOTE ADULT - SUBJECTIVE AND OBJECTIVE BOX
Patient seen and evaluated this am, sedated on ventilator with propofol and fentanyl      T(F): 100 (06-15-25 @ 09:00), Max: 100.6 (06-15-25 @ 05:30)  HR: 66 (06-15-25 @ 09:00)  BP: 91/52 (06-15-25 @ 09:00)  RR: 24 (06-15-25 @ 09:00)  SpO2: 100% (06-15-25 @ 09:00) (99% - 100%)    PHYSICAL EXAM:  GENERAL: NAD  HEAD:  Atraumatic, Normocephalic  EYES: EOMI, PERRLA, conjunctiva and sclera clear  NERVOUS SYSTEM:  , no focal deficits   CHEST/LUNG:  bilateral rhonchi  HEART: Regular rate and rhythm; No murmurs, rubs, or gallops  ABDOMEN: Soft, Nontender, Nondistended; Bowel sounds present  EXTREMITIES:  b/l  edema    LABS  06-15    137  |  101  |  37[H]  ----------------------------<  114[H]  5.6[H]   |  21  |  2.2[H]    Ca    9.5      15 Kleber 2025 06:45  Phos  4.6     06-15  Mg     2.2     06-15    TPro  5.8[L]  /  Alb  3.0[L]  /  TBili  0.4  /  DBili  x   /  AST  12  /  ALT  8   /  AlkPhos  82  06-15                          13.2   46.99 )-----------( 431      ( 15 Kleber 2025 06:45 )             43.8     PT/INR - ( 15 Kleber 2025 06:45 )   PT: 21.30 sec;   INR: 1.78 ratio           Mode: AC/ CMV (Assist Control/ Continuous Mandatory Ventilation)  RR (machine): 24  TV (machine): 450  FiO2: 40  PEEP: 8    Culture Results:   No growth at 5 days (06-02-25)  Culture Results:   No growth at 5 days (06-02-25)    RADIOLOGY  < from: Xray Chest 1 View- PORTABLE-Routine (Xray Chest 1 View- PORTABLE-Routine in AM.) (06.15.25 @ 07:33) >  IMPRESSION: Low lung volume.    Bilateral opacities, unchanged.    < end of copied text >    < from: TTE Echo Complete w/o Contrast w/ Doppler (04.20.25 @ 13:14) >    Summary:   1. LV Ejection Fraction by Beck's Method with a biplane EF of 30 %.   2. Severely decreased global left ventricular systolic function.   3. Endocardial visualization was enhanced with intravenous echo contrast.   4. Left atrial enlargement.   5. Mildly reduced RV systolic function.   6. Mild mitral valve regurgitation.   7. Thickening of the anterior and posterior mitral valve leaflets.   8. Moderate tricuspid regurgitation.   9. Bioprosthetic aortic valve in place. Peak/mean rgqneitt64.9/15.3   mmHg.  10. Estimated pulmonary artery systolic pressure is 59.9 mmHg assuming a   right atrial pressure of 3 mmHg, which is consistent with moderate   pulmonary hypertension.  11. Likely PFO noted by color doppler wtih left to right shunt.    < end of copied text >    MEDICATIONS  (STANDING):  aMIOdarone    Tablet 200 milliGRAM(s) Oral daily  apixaban 5 milliGRAM(s) Oral every 12 hours  atorvastatin 40 milliGRAM(s) Oral at bedtime  bumetanide Injectable 2 milliGRAM(s) IV Push every 12 hours  cefepime   IVPB 2000 milliGRAM(s) IV Intermittent every 12 hours  chlorhexidine 0.12% Liquid 15 milliLiter(s) Oral Mucosa every 12 hours  chlorhexidine 2% Cloths 1 Application(s) Topical <User Schedule>  clopidogrel Tablet 75 milliGRAM(s) Oral daily  dexMEDEtomidine Infusion 0.4 MICROgram(s)/kG/Hr (8.45 mL/Hr) IV Continuous <Continuous>  ezetimibe 10 milliGRAM(s) Oral daily  famotidine    Tablet 20 milliGRAM(s) Oral daily  fentaNYL   Infusion. 0.5 MICROgram(s)/kG/Hr (4.23 mL/Hr) IV Continuous <Continuous>  insulin glargine Injectable (LANTUS) 34 Unit(s) SubCutaneous every morning  insulin lispro (ADMELOG) corrective regimen sliding scale   SubCutaneous every 6 hours  norepinephrine Infusion 0.05 MICROgram(s)/kG/Min (7.92 mL/Hr) IV Continuous <Continuous>  propofol Infusion 5 MICROgram(s)/kG/Min (2.54 mL/Hr) IV Continuous <Continuous>  tiotropium 2.5 MICROgram(s) Inhaler 2 Puff(s) Inhalation daily    MEDICATIONS  (PRN):  acetaminophen     Tablet .. 650 milliGRAM(s) Oral every 6 hours PRN Temp greater or equal to 38C (100.4F), Mild Pain (1 - 3)  albuterol/ipratropium for Nebulization 3 milliLiter(s) Nebulizer every 6 hours PRN Shortness of Breath and/or Wheezing  aluminum hydroxide/magnesium hydroxide/simethicone Suspension 30 milliLiter(s) Oral every 4 hours PRN Dyspepsia  dextrose Oral Gel 15 Gram(s) Oral once PRN Blood Glucose LESS THAN 70 milliGRAM(s)/deciliter  guaiFENesin  milliGRAM(s) Oral every 12 hours PRN for cough  melatonin 5 milliGRAM(s) Oral at bedtime PRN Insomnia  ondansetron Injectable 4 milliGRAM(s) IV Push every 8 hours PRN Nausea and/or Vomiting        Pending/Follow up items (tests, labs, procedures,consults):    Indication for continued inpatient management:    Goals of Care note:     Family contact:  Dispo (home, SNF, etc):    Prepare for DC order [x] - updated MARLENY is:   DC provider note prep:    -------------------------------Time spent-----------------------------------------------------------------------  Initial visit:             mins [ ] -- 55-74 mins [ ]  Subsequent visit: 50-79 mins[ ]    -- 35-49mins [ ]     --------------------------------# and complexity of problems---------------------------------------------  [ ] chronic illness with severe exacerbation , progression, treatment side effect  [ ] acute or chronic illness with threat to life or bodily funtion                         --------------------------------Complexity of data reviewed ----------------------------------------------  The Patients complexity of data reviewed  is Low [ ] Moderate [ ] High [ ] due to the following:   A:      Reviewed prior external records [ ]      Considering/ Ordered a unique test:  Labs [x ] Imaging [x ] Stress Test  [ ] Other: Specify [ ]      Reviewed each unique test result [x ]      Assessment requiring an independent historian [ ]  B:       Independent interpretation of:   X-Ray [x ] EKG [ ]  Other: Specify  [ ]  C:       Discussion of management of tests with clinician outside of my group [ ]    -------------------------------------Risk of Morbidity-------------------------------------------------------  The Patients risk of morbidity is Low [ ] Moderate [ ] High [ ] due to the following:   Mod:  Prescription Drug Management [ ]  Decision regarding elective or emergent: minor surgery [ ] major surgery [ ]  Decision regarding hospitalization or escalation of hospital-level care [ ]  SDOH: Hearing/Vision impaired [ ] Food insecurity [ ] Homelessness/unsafe condition [ ]   Financial strain [ ] un/underemployed [ ] Lack of Transport [ ]  High:  DNR or De-Escalation of care because of poor prognosis [ ]  Drug Therapy requiring intensive monitoring for toxicity [ ]  Parenteral controlled substance [ ]

## 2025-06-15 NOTE — PROGRESS NOTE ADULT - ASSESSMENT
MONIQUE LYONS is a 70y man with a medical history significant for COPD (on 2L home O2), ischemic cardiomyopathy (LVEF 43%, s/p PCI) s/p CRT-D, paroxysmal A-fib (s/p ablation 01/2025) on AC, CAD s/p PCI (2019), and multiple recent similar admissions who presented initially with progressive LESLIE and lower extremity swelling, now admitted to the critical care unit with acute decompensated heart failure and acute pulmonary edema requiring intubation    Impression    acute decompensated heart failure  acute pulmonary edema  not COPD exacerbation, doubt pneumonia given rapid improvement with NIV and lasix  pulm HTN multifactorial secondary to cardiomyopathy reduced EF, likely LAW, baseline COPD  chronic hypoxia from all the above  B/L small pleural effusions to  pulmonary edema  Plan:      CNS:  Sedation: Precedex & Fentanyl  Target RASS 0 to -1  Follow-up palliative given discordant GOC between patient and familiy  Daily SAT    HEENT:  Oral care. ETT care  ETT day: #2    CARDIOVASCULAR  Vasopressors: levophed  Inadequate UOP on lasix 40mg q6, increase to 80mg TID or as directed by cardiology  Now that patient is in shock, recommend evaluation of CO w/ Mis or hemosphere for possible inotrope initiation  Follow-up TTE    PULMONARY  HOB @ 45 degrees, aspiration precautions  Target plateau pressure < 30, driving pressure <15  Vent changes: Decrease RR to 22, Wean FiO2 as tolerated to maintain spO2 92-96%   Albuterol PRN  Daily SBT, recommend not extubating until patient is appreciably net negative with less pulmonary edema    GASTROINTESTINAL  GI prophylaxis: not indicated  Feeds: Start TF  BM: regimen PRN    GENITOURINARY/RENAL  Iyer: yes  Monitor I&O strictly; renal function & electrolytes     INFECTIOUS  Empiric cefepime & vancomycin coverage  Follow-up cultures  Appreciate ID recommendations    HEMATOLOGIC  DVT ppx: therapeutic apixaban    ENDOCRINE  Follow up FS.  Insulin protocol as needed.  BG goal 140-180    MSK/DERM  PT/OT when cooperative      CODE STATUS: FULL CODE; follow-up palliative  DISPO: MICU  LINES: iyer

## 2025-06-15 NOTE — PROGRESS NOTE ADULT - SUBJECTIVE AND OBJECTIVE BOX
SERENAMONIQUE  70y, Male    LOS  1d    INTERVAL EVENTS/HPI  - T(F): , Max: 100.6 (06-15-25 @ 05:30)  - WBC Count: 46.99 (06-15-25 @ 06:45)  WBC Count: 40.88 (06-14-25 @ 19:24)  - Creatinine: 2.2 (06-15-25 @ 06:45)  Creatinine: 1.8 (06-14-25 @ 19:24)    REVIEW OF SYSTEMS: cannot obtain further history from the patient secondary to altered mental status or sedation      ANTIMICROBIALS:   cefepime   IVPB 2000 every 12 hours      OTHER MEDS: MEDICATIONS  (STANDING):  acetaminophen     Tablet .. 650 every 6 hours PRN  albuterol/ipratropium for Nebulization 3 every 6 hours PRN  aluminum hydroxide/magnesium hydroxide/simethicone Suspension 30 every 4 hours PRN  aMIOdarone    Tablet 200 daily  apixaban 5 every 12 hours  atorvastatin 40 at bedtime  bumetanide Injectable 2 every 12 hours  clopidogrel Tablet 75 daily  dexMEDEtomidine Infusion 0.4 <Continuous>  dextrose 50% Injectable 25 once  dextrose 50% Injectable 12.5 once  dextrose 50% Injectable 25 once  dextrose Oral Gel 15 once PRN  ezetimibe 10 daily  famotidine    Tablet 20 daily  fentaNYL   Infusion. 0.5 <Continuous>  glucagon  Injectable 1 once  guaiFENesin  every 12 hours PRN  insulin glargine Injectable (LANTUS) 34 every morning  insulin lispro (ADMELOG) corrective regimen sliding scale  every 6 hours  melatonin 5 at bedtime PRN  norepinephrine Infusion 0.05 <Continuous>  ondansetron Injectable 4 every 8 hours PRN  propofol Infusion 5 <Continuous>  tiotropium 2.5 MICROgram(s) Inhaler 2 daily      Vital Signs Last 24 Hrs  T(F): 100 (06-15-25 @ 09:00), Max: 100.6 (06-15-25 @ 05:30)    Vital Signs Last 24 Hrs  HR: 66 (06-15-25 @ 09:00) (64 - 89)  BP: 91/52 (06-15-25 @ 09:00) (76/51 - 123/67)  RR: 24 (06-15-25 @ 09:00)  SpO2: 100% (06-15-25 @ 09:00) (99% - 100%)  Wt(kg): --    EXAM:  GENERAL: On MV  HEAD: No head lesions  NECK: Supple  CHEST/LUNG: Shallow breath sounds   HEART: S1 S2  ABDOMEN: Soft, nontender +Mcmahon  EXTREMITIES: No clubbing  MSK: +2 edema   SKIN: No rashes or lesions, no superficial thrombophlebitis    Labs:                        13.2   46.99 )-----------( 431      ( 15 Kleber 2025 06:45 )             43.8     06-15    137  |  101  |  37[H]  ----------------------------<  114[H]  5.6[H]   |  21  |  2.2[H]    Ca    9.5      15 Kleber 2025 06:45  Phos  4.6     06-15  Mg     2.2     06-15    TPro  5.8[L]  /  Alb  3.0[L]  /  TBili  0.4  /  DBili  x   /  AST  12  /  ALT  8   /  AlkPhos  82  06-15      WBC Trend:  WBC Count: 46.99 (06-15-25 @ 06:45)  WBC Count: 40.88 (06-14-25 @ 19:24)  WBC Count: 42.76 (06-14-25 @ 12:00)  WBC Count: 53.32 (06-14-25 @ 07:14)      Creatine Trend:  Creatinine: 2.2 (06-15)  Creatinine: 1.8 (06-14)  Creatinine: 1.8 (06-14)  Creatinine: TNP (06-14)      Liver Biochemical Testing Trend:  Alanine Aminotransferase (ALT/SGPT): 8 (06-15)  Alanine Aminotransferase (ALT/SGPT): 11 (06-14)  Alanine Aminotransferase (ALT/SGPT): TNP (06-14)  Alanine Aminotransferase (ALT/SGPT): 12 (06-14)  Alanine Aminotransferase (ALT/SGPT): 14 (06-06)  Aspartate Aminotransferase (AST/SGOT): 12 (06-15-25 @ 06:45)  Aspartate Aminotransferase (AST/SGOT): 20 (06-14-25 @ 19:24)  Aspartate Aminotransferase (AST/SGOT): TNP (06-14-25 @ 12:00)  Aspartate Aminotransferase (AST/SGOT): 24 (06-14-25 @ 01:20)  Aspartate Aminotransferase (AST/SGOT): 16 (06-06-25 @ 07:09)  Bilirubin Total: 0.4 (06-15)  Bilirubin Total: 0.4 (06-14)  Bilirubin Total: TNP (06-14)  Bilirubin Total: 0.4 (06-14)  Bilirubin Total: 0.6 (06-06)      Trend LDH  06-15-25 @ 06:45  375[H]      Urinalysis Basic - ( 15 Kleber 2025 06:45 )    Color: x / Appearance: x / SG: x / pH: x  Gluc: 114 mg/dL / Ketone: x  / Bili: x / Urobili: x   Blood: x / Protein: x / Nitrite: x   Leuk Esterase: x / RBC: x / WBC x   Sq Epi: x / Non Sq Epi: x / Bacteria: x        MICROBIOLOGY:    Male    Culture - Blood (collected 02 Jun 2025 18:05)  Source: Blood Blood  Final Report:    No growth at 5 days    Culture - Blood (collected 02 Jun 2025 18:05)  Source: Blood Blood  Final Report:    No growth at 5 days    Culture - Blood (collected 18 Apr 2025 22:18)  Source: Blood Blood  Final Report:    No growth at 5 days    Culture - Blood (collected 18 Apr 2025 22:17)  Source: Blood Blood  Final Report:    No growth at 5 days    Culture - Blood (collected 28 Mar 2025 20:45)  Source: Blood Blood-Peripheral  Final Report:    No growth at 5 days    Culture - Blood (collected 31 Dec 2024 15:45)  Source: .Blood BLOOD  Final Report:    No growth at 5 days    Culture - Blood (collected 03 Sep 2023 12:50)  Source: .Blood Blood  Final Report:    No growth at 5 days    Culture - Blood (collected 03 Sep 2023 12:50)  Source: .Blood Blood  Final Report:    No growth at 5 days      HIV-1/2 Combo Result: Nonreact (01-04-25 @ 05:58)                      COVID-19 PCR: NotDetec (05-15-25 @ 09:50)      Procalcitonin: 1.49 (06-14)          Lactate Dehydrogenase, Serum: 375 (06-15)      Troponin T, High Sensitivity Result: 121 (06-15)  Troponin T, High Sensitivity Result: 141 (06-14)  Troponin T, High Sensitivity Result: 134 (06-14)  Troponin T, High Sensitivity Result: 137 (06-14)  Troponin T, High Sensitivity Result: 121 (06-14)  Troponin T, High Sensitivity Result: 113 (06-14)    Lactate, Blood: 1.6 (06-15 @ 06:45)  Blood Gas Arterial, Lactate: 1.1 (06-15 @ 04:30)  Blood Gas Arterial, Lactate: 1.4 (06-14 @ 16:54)  Lactate, Blood: 3.1 (06-14 @ 12:00)        RADIOLOGY & ADDITIONAL TESTS:  I have personally reviewed the relevant images.   CXR  Xray Chest 1 View AP/PA:   ACC: [id number] EXAM:  XR CHEST 1 VIEW   ORDERED BY: RADHA VICTORIA     PROCEDURE DATE:  06/13/2025          INTERPRETATION:  CLINICAL HISTORY: Shortness of breath    COMPARISON: Frontal chest June 5, 2025    TECHNIQUE: Portable frontal chest radiograph.    FINDINGS:    Support devices: Stable position left ICD.    Cardiac/mediastinum/hilum: Cardiomegaly, thoracic aortic and coronary   artery calcification, status post median sternotomy, aortic valve   replacement..    Lung parenchyma/Pleura: Bilateral opacities/pleural effusions    Skeleton/soft tissues: Stable bony structures. Epigastric surgical clips.      IMPRESSION:    Bilateral opacities/pleural effusion, increased. Redemonstration   cardiomegaly.    --- End of Report ---            TORI CHRISTIANSEN MD; Attending Radiologist  This document has been electronically signed. Jun 14 2025  5:35AM (06-13-25 @ 23:45)      CT    < from: Xray Chest 1 View- PORTABLE-Routine (Xray Chest 1 View- PORTABLE-Routine in AM.) (06.15.25 @ 07:33) >    IMPRESSION: Low lung volume.    Bilateral opacities, unchanged.    Support tubes as above.    Left-sided permanent pacemaker.    --- End of Report ---    < end of copied text >      WEIGHT  Weight (kg): 83.3 (06-14-25 @ 08:00)  Creatinine: 2.2 mg/dL (06-15-25 @ 06:45)  Creatinine: 1.8 mg/dL (06-14-25 @ 19:24)  Creatinine: 1.8 mg/dL (06-14-25 @ 14:51)  Creatinine: TNP mg/dL (06-14-25 @ 12:00)      All available historical records have been reviewed

## 2025-06-15 NOTE — PROGRESS NOTE ADULT - ASSESSMENT
70 year old male with media l history of  COPD on 2L home O2, diabetes, chronic  HFrEF  s/p CRT-D, AVR, hypertension, paroxysmal A-fib (status post ablation 1/14/25)  CAD admitted  for dyspnea on exertion and leg swelling.     acute respiratory failure / acute on chronic HFrEF /  70 year old male with media l history of CKD3,  COPD on 2L home O2, diabetes, chronic  HFrEF  s/p CRT-D, AVR, hypertension, paroxysmal A-fib (status post ablation 1/14/25)  CAD admitted  for dyspnea on exertion and leg swelling.     acute respiratory failure / acute on chronic HFrEF / possible aspiration pneumonia     - continue IV Bumex and negative fluid balance   - check TTE   - continue IV antibiotics as per ID   - cardiology and pulmonary following   - 50 minutes total spent today on patient care

## 2025-06-15 NOTE — PROGRESS NOTE ADULT - ASSESSMENT
This is a 70 year old male with past medical history of  COPD (on 2L home O2), DM 2  HFrEF 30%, CAD,  ischemic cardiomyopathy s/p CRT-D, HTN, paroxysmal A-fib (status post ablation 1/14/25) on amiodarone and  Eliquis who presented to ED w c/o LESLIE and b/l  leg swelling.    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  #Obesity BMI (kg/m2): 32.5, 28.7, 29.6, 30.1    Recently Enterovirus Positive Discharged on PO steroids Vantin+Doxy    Covid/Flu/RSV negative       RECOMMENDATIONS  -Follow up with blood cultures.  -Sputum cultures. MRSA nares  -Cardiology follow up. Diuresis  -Should be considered for thoracentesis.  -IV Vanc. Dosing as per pharmacy.  -IV Cefepime 2 gram q 12 hours.   -Off loading to prevent pressure sores and preventive measures to avoid aspiration. TOV. GOC. Recurrent admissions expected.     If any questions, please send a message or call on [employer] Teams  Please continue to update ID with any pertinent new laboratory or radiographic findings.    Benigno Pond M.D  Infectious Diseases Attending/   [employer] at \[facility] [place] [facility]\""/[facility]

## 2025-06-16 ENCOUNTER — RESULT REVIEW (OUTPATIENT)
Age: 70
End: 2025-06-16

## 2025-06-16 NOTE — DIETITIAN INITIAL EVALUATION ADULT - NS FNS DIET ORDER
Diet, NPO with Tube Feed:   Tube Feeding Modality: Orogastric  Nepro with Carb Steady (NEPRORTH)  Total Volume for 24 Hours (mL): 1200  Bolus  Total Volume of Bolus (mL):  300  Total # of Feeds: 4  Tube Feed Frequency: Every 6 hours   Tube Feed Start Time: 11:00  Bolus Feed Rate (mL per Hour): 300   Bolus Feed Duration (in Hours): 2  Bolus Feed Instructions:  Please start feeds at 50 cc per hour, and increase as tolerated to goal feeding rate of 150 cc/hr (06-15-25 @ 17:38)

## 2025-06-16 NOTE — PROGRESS NOTE ADULT - ASSESSMENT
This is a 70 year old male with past medical history of  COPD (on 2L home O2), DM 2  HFrEF 30%, CAD,  ischemic cardiomyopathy s/p CRT-D, HTN, paroxysmal A-fib (status post ablation 1/14/25) on amiodarone and  Eliquis who presented to ED w c/o LESLIE and b/l  leg swelling.    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  #Obesity BMI (kg/m2): 32.5, 28.7, 29.6, 30.1    Recently Enterovirus Positive Discharged on PO steroids Vantin+Doxy    Covid/Flu/RSV negative  MRSA nares positive    Blood cultures NGTD    RECOMMENDATIONS  -Follow up with Sputum cultures.  -Cardiology follow up. Diuresis  -IV Vanc. Dosing as per pharmacy.  -IV Cefepime 1 gram q 12 hours. (S/D 6/14)  -Off loading to prevent pressure sores and preventive measures to avoid aspiration. TOV. GOC. Recurrent admissions expected.     If any questions, please send a message or call on [employer] Teams  Please continue to update ID with any pertinent new laboratory or radiographic findings.    Benigno Pond M.D  Infectious Diseases Attending/   [employer] at hospitals/[facility]

## 2025-06-16 NOTE — PROGRESS NOTE ADULT - SUBJECTIVE AND OBJECTIVE BOX
Patient is a 70y old  Male who presents with a chief complaint of SOB (16 Jun 2025 09:08)        Over Night Events: No acute events, failed SAT, agitated         ROS:     All ROS are negative except HPI         PHYSICAL EXAM    ICU Vital Signs Last 24 Hrs  T(C): 38 (16 Jun 2025 08:34), Max: 38.7 (16 Jun 2025 02:54)  T(F): 100.4 (16 Jun 2025 08:34), Max: 101.7 (16 Jun 2025 02:54)  HR: 79 (16 Jun 2025 08:34) (62 - 100)  BP: 111/55 (16 Jun 2025 08:34) (64/37 - 157/70)  BP(mean): 79 (16 Jun 2025 08:34) (46 - 101)  ABP: --  ABP(mean): --  RR: 21 (16 Jun 2025 08:34) (20 - 40)  SpO2: 100% (16 Jun 2025 08:34) (85% - 100%)    O2 Parameters below as of 16 Jun 2025 08:34  Patient On (Oxygen Delivery Method): ventilator    O2 Concentration (%): 40        CONSTITUTIONAL:  NAD, intubated     ENT:   Airway patent,   Mouth with normal mucosa.     EYES:   Pupils equal,   Round and reactive to light.    CARDIAC:   Normal rate,   Regular rhythm.      Vascular:  Normal systolic impulse  No Carotid bruits    RESPIRATORY:   No wheezing  Bilateral BS  Normal chest expansion    GASTROINTESTINAL:  Abdomen soft,   Non-tender,     MUSCULOSKELETAL:   Range of motion is not limited,  No clubbing, cyanosis    NEUROLOGICAL:   Alert and oriented x0   No motor  deficits.    SKIN:   Skin normal color for race,   Warm and dry and intact.   No evidence of rash.      06-15-25 @ 07:01  -  06-16-25 @ 07:00  --------------------------------------------------------  IN:    Dexmedetomidine: 158 mL    FentaNYL: 545 mL    IV PiggyBack: 400 mL    Jevity 1.2: 600 mL    Nepro with Carb Steady: 650 mL    Norepinephrine: 255 mL    Propofol: 79 mL  Total IN: 2687 mL    OUT:    Indwelling Catheter - Urethral (mL): 645 mL    Voided (mL): 830 mL  Total OUT: 1475 mL    Total NET: 1212 mL      06-16-25 @ 07:01  -  06-16-25 @ 10:07  --------------------------------------------------------  IN:  Total IN: 0 mL    OUT:    Indwelling Catheter - Urethral (mL): 45 mL  Total OUT: 45 mL    Total NET: -45 mL          LABS:                            13.4   33.02 )-----------( 335      ( 16 Jun 2025 06:28 )             44.5                                               06-16    140  |  102  |  56[H]  ----------------------------<  136[H]  4.7   |  23  |  2.3[H]    Ca    9.4      16 Jun 2025 06:28  Phos  4.6     06-15  Mg     2.8     06-16    TPro  6.5  /  Alb  3.5  /  TBili  0.4  /  DBili  x   /  AST  18  /  ALT  11  /  AlkPhos  100  06-16      PT/INR - ( 15 Kleber 2025 06:45 )   PT: 21.30 sec;   INR: 1.78 ratio                                                Urinalysis Basic - ( 16 Jun 2025 06:28 )    Color: x / Appearance: x / SG: x / pH: x  Gluc: 136 mg/dL / Ketone: x  / Bili: x / Urobili: x   Blood: x / Protein: x / Nitrite: x   Leuk Esterase: x / RBC: x / WBC x   Sq Epi: x / Non Sq Epi: x / Bacteria: x                                                  LIVER FUNCTIONS - ( 16 Jun 2025 06:28 )  Alb: 3.5 g/dL / Pro: 6.5 g/dL / ALK PHOS: 100 U/L / ALT: 11 U/L / AST: 18 U/L / GGT: x                                                  Culture - Sputum (collected 15 Kleber 2025 16:44)  Source: Sputum Sputum  Gram Stain (16 Jun 2025 01:30):    Rare polymorphonuclear leukocytes per low power field    No Squamous epithelial cells per low power field    Rare Yeast like cells seen per oil power field    Rare Gram Positive Cocci in Clusters seen per oil power field    Culture - Blood (collected 14 Jun 2025 15:36)  Source: Blood None  Preliminary Report (16 Jun 2025 01:02):    No growth at 24 hours                                                   Mode: AC/ CMV (Assist Control/ Continuous Mandatory Ventilation)  RR (machine): 20  TV (machine): 450  FiO2: 40  PEEP: 8  ITime: 1  MAP: 14  PIP: 24                                      ABG - ( 16 Jun 2025 04:30 )  pH, Arterial: 7.44  pH, Blood: x     /  pCO2: 29    /  pO2: 160   / HCO3: 20    / Base Excess: -3.4  /  SaO2: 99.6                MEDICATIONS  (STANDING):  aMIOdarone    Tablet 200 milliGRAM(s) Oral daily  apixaban 5 milliGRAM(s) Oral every 12 hours  atorvastatin 40 milliGRAM(s) Oral at bedtime  bumetanide Injectable 1 milliGRAM(s) IV Push every 12 hours  cefepime   IVPB 1000 milliGRAM(s) IV Intermittent every 12 hours  chlorhexidine 0.12% Liquid 15 milliLiter(s) Oral Mucosa every 12 hours  chlorhexidine 2% Cloths 1 Application(s) Topical <User Schedule>  clopidogrel Tablet 75 milliGRAM(s) Oral daily  dexMEDEtomidine Infusion 0.05 MICROgram(s)/kG/Hr (1.04 mL/Hr) IV Continuous <Continuous>  dextrose 5%. 1000 milliLiter(s) (100 mL/Hr) IV Continuous <Continuous>  dextrose 5%. 1000 milliLiter(s) (50 mL/Hr) IV Continuous <Continuous>  dextrose 50% Injectable 25 Gram(s) IV Push once  dextrose 50% Injectable 12.5 Gram(s) IV Push once  dextrose 50% Injectable 25 Gram(s) IV Push once  ezetimibe 10 milliGRAM(s) Oral daily  famotidine    Tablet 20 milliGRAM(s) Oral daily  fentaNYL   Infusion. 0.508 MICROgram(s)/kG/Hr (4.23 mL/Hr) IV Continuous <Continuous>  glucagon  Injectable 1 milliGRAM(s) IntraMuscular once  insulin glargine Injectable (LANTUS) 34 Unit(s) SubCutaneous every morning  insulin lispro (ADMELOG) corrective regimen sliding scale   SubCutaneous every 6 hours  mupirocin 2% Nasal 1 Application(s) Both Nostrils every 12 hours  norepinephrine Infusion 0.05 MICROgram(s)/kG/Min (7.92 mL/Hr) IV Continuous <Continuous>  propofol Infusion 5 MICROgram(s)/kG/Min (2.54 mL/Hr) IV Continuous <Continuous>  sodium bicarbonate 650 milliGRAM(s) Oral every 8 hours    MEDICATIONS  (PRN):  acetaminophen     Tablet .. 650 milliGRAM(s) Oral every 6 hours PRN Temp greater or equal to 38C (100.4F), Mild Pain (1 - 3)  albuterol/ipratropium for Nebulization 3 milliLiter(s) Nebulizer every 6 hours PRN Shortness of Breath and/or Wheezing  aluminum hydroxide/magnesium hydroxide/simethicone Suspension 30 milliLiter(s) Oral every 4 hours PRN Dyspepsia  dextrose Oral Gel 15 Gram(s) Oral once PRN Blood Glucose LESS THAN 70 milliGRAM(s)/deciliter  guaiFENesin  milliGRAM(s) Oral every 12 hours PRN for cough  melatonin 5 milliGRAM(s) Oral at bedtime PRN Insomnia  ondansetron Injectable 4 milliGRAM(s) IV Push every 8 hours PRN Nausea and/or Vomiting      New X-rays reviewed:                                                                                  ECHO

## 2025-06-16 NOTE — PROGRESS NOTE ADULT - SUBJECTIVE AND OBJECTIVE BOX
MONIQUE LYONS 70y Male  MRN#: [id number]   Hospital Day: 2d    HPI:   Patient is a  70 year old male with past medical history of  COPD (on 2L home O2), DM 2  HFrEF 30%, CAD,  ischemic cardiomyopathy s/p CRT-D, HTN, paroxysmal A-fib (status post ablation 1/14/25) on amiodarone and  Eliquis who presented to ED w c/o LESLIE and b/l  leg swelling.  Patient said it started 2 days ago and felt like his previous heart failure exacerbations. Patient denied n/v/f/c; denied HA lightheadedness; denied palpitations cough CP (now resolved on admission).  (14 Jun 2025 02:21)      SUBJECTIVE  Patient is a 70y old Male who presents with a chief complaint of SOB (16 Jun 2025 11:28)  Currently admitted to medicine with the primary diagnosis of CHF exacerbation      INTERVAL HPI AND OVERNIGHT EVENTS:  Patient was examined and seen at bedside. This morning he is resting comfortably in bed and reports no issues or overnight events.      OBJECTIVE  PAST MEDICAL & SURGICAL HISTORY  CHF (congestive heart failure)    Diabetes    CAD (coronary artery disease)    Chronic obstructive pulmonary disease (COPD)    HTN (hypertension)    Stented coronary artery    Dyslipidemia    GERD (gastroesophageal reflux disease)    Afib    CVA (cerebral vascular accident)    H/O heart artery stent    Heart valve replaced  aortic    History of Nissen fundoplication      ALLERGIES:  Bactrim (Unknown)  sulfa drugs (Unknown)    MEDICATIONS:  STANDING MEDICATIONS  aMIOdarone    Tablet 200 milliGRAM(s) Oral daily  apixaban 5 milliGRAM(s) Enteral Tube two times a day  atorvastatin 40 milliGRAM(s) Oral at bedtime  cefepime   IVPB 1000 milliGRAM(s) IV Intermittent every 12 hours  chlorhexidine 0.12% Liquid 15 milliLiter(s) Oral Mucosa every 12 hours  chlorhexidine 2% Cloths 1 Application(s) Topical <User Schedule>  clopidogrel Tablet 75 milliGRAM(s) Enteral Tube daily  dexMEDEtomidine Infusion 0.05 MICROgram(s)/kG/Hr IV Continuous <Continuous>  dextrose 5%. 1000 milliLiter(s) IV Continuous <Continuous>  dextrose 5%. 1000 milliLiter(s) IV Continuous <Continuous>  dextrose 50% Injectable 25 Gram(s) IV Push once  dextrose 50% Injectable 12.5 Gram(s) IV Push once  dextrose 50% Injectable 25 Gram(s) IV Push once  ezetimibe 10 milliGRAM(s) Oral daily  famotidine    Tablet 20 milliGRAM(s) Oral daily  fentaNYL   Infusion. 0.508 MICROgram(s)/kG/Hr IV Continuous <Continuous>  glucagon  Injectable 1 milliGRAM(s) IntraMuscular once  insulin glargine Injectable (LANTUS) 34 Unit(s) SubCutaneous every morning  insulin lispro (ADMELOG) corrective regimen sliding scale   SubCutaneous every 6 hours  midodrine 10 milliGRAM(s) Oral every 8 hours  mupirocin 2% Nasal 1 Application(s) Both Nostrils every 12 hours  norepinephrine Infusion 0.05 MICROgram(s)/kG/Min IV Continuous <Continuous>  propofol Infusion 5 MICROgram(s)/kG/Min IV Continuous <Continuous>  QUEtiapine 25 milliGRAM(s) Oral at bedtime    PRN MEDICATIONS  acetaminophen     Tablet .. 650 milliGRAM(s) Oral every 6 hours PRN  albuterol/ipratropium for Nebulization 3 milliLiter(s) Nebulizer every 6 hours PRN  aluminum hydroxide/magnesium hydroxide/simethicone Suspension 30 milliLiter(s) Oral every 4 hours PRN  dextrose Oral Gel 15 Gram(s) Oral once PRN  guaiFENesin Oral Liquid (Sugar-Free) 200 milliGRAM(s) Oral every 6 hours PRN  melatonin 5 milliGRAM(s) Oral at bedtime PRN  ondansetron Injectable 4 milliGRAM(s) IV Push every 8 hours PRN      VITAL SIGNS: Last 24 Hours  T(C): 37.9 (16 Jun 2025 11:00), Max: 38.7 (16 Jun 2025 02:54)  T(F): 100.2 (16 Jun 2025 11:00), Max: 101.7 (16 Jun 2025 02:54)  HR: 79 (16 Jun 2025 08:34) (62 - 100)  BP: 111/55 (16 Jun 2025 08:34) (64/37 - 157/70)  BP(mean): 79 (16 Jun 2025 08:34) (46 - 101)  RR: 19 (16 Jun 2025 11:00) (19 - 40)  SpO2: 100% (16 Jun 2025 08:34) (85% - 100%)    LABS:                        13.4   33.02 )-----------( 335      ( 16 Jun 2025 06:28 )             44.5     06-16    140  |  102  |  56[H]  ----------------------------<  136[H]  4.7   |  23  |  2.3[H]    Ca    9.4      16 Jun 2025 06:28  Phos  4.6     06-15  Mg     2.8     06-16    TPro  6.5  /  Alb  3.5  /  TBili  0.4  /  DBili  x   /  AST  18  /  ALT  11  /  AlkPhos  100  06-16    PT/INR - ( 15 Kleber 2025 06:45 )   PT: 21.30 sec;   INR: 1.78 ratio           Urinalysis Basic - ( 16 Jun 2025 06:28 )    Color: x / Appearance: x / SG: x / pH: x  Gluc: 136 mg/dL / Ketone: x  / Bili: x / Urobili: x   Blood: x / Protein: x / Nitrite: x   Leuk Esterase: x / RBC: x / WBC x   Sq Epi: x / Non Sq Epi: x / Bacteria: x      ABG - ( 16 Jun 2025 04:30 )  pH, Arterial: 7.44  pH, Blood: x     /  pCO2: 29    /  pO2: 160   / HCO3: 20    / Base Excess: -3.4  /  SaO2: 99.6                  Culture - Sputum (collected 15 Kleber 2025 16:44)  Source: Sputum Sputum  Gram Stain (16 Jun 2025 01:30):    Rare polymorphonuclear leukocytes per low power field    No Squamous epithelial cells per low power field    Rare Yeast like cells seen per oil power field    Rare Gram Positive Cocci in Clusters seen per oil power field    Culture - Blood (collected 14 Jun 2025 15:36)  Source: Blood None  Preliminary Report (16 Jun 2025 01:02):    No growth at 24 hours      ASSESSMENT & PLAN     70 year old male with past medical history of  COPD (on 2L home O2), DM 2  HFrEF 30%, CAD,  ischemic cardiomyopathy s/p CRT-D, HTN, paroxysmal A-fib (status post ablation 1/14/25) on amiodarone and  Eliquis who presented to ED w c/o LESLIE and b/l  leg swelling.     Impression:  #SOB/LESLIE: Multifactorial  #HFrEF (EF 30%, s/p CRT-D)  #CAD s/p PCI to RCA and LAD  #NSTEMI/ Hx of frequent NSVT   #Paroxysmal AFib on Eliquis        Plan:  - Currently intubated, mechanically ventilated, and sedated  - Holding Bumex now, IVC flat, kidney function worsening   - f/u repeat Echo   - Check BMP daily and monitor renal function. Maintain K >4.0, Mg >2.2    - Strict intake and output, maintain negative balance for now  - pBNP 2894. Repeat closer to discharge  - Restart home GDMT for HFrEF, when stable/medically appropriate   - For Afib - c/w AC, BB and amiodarone   - Pt needs ischemic eval, but has refused in the past

## 2025-06-16 NOTE — PROGRESS NOTE ADULT - SUBJECTIVE AND OBJECTIVE BOX
TOSINJAMEEMONIQUE CHRISTINE  70y, Male    LOS  2d    INTERVAL EVENTS/HPI  - T(F): , Max: 101.7 (06-16-25 @ 02:54)  - WBC Count: 33.02 (06-16-25 @ 06:28)  WBC Count: 46.99 (06-15-25 @ 06:45)  - Creatinine: 2.3 (06-16-25 @ 06:28)  Creatinine: 2.1 (06-15-25 @ 19:31)    REVIEW OF SYSTEMS: cannot obtain further history from the patient secondary to altered mental status or sedation      Prior hospital charts reviewed [Yes]  Primary team notes reviewed [Yes]  Other consultant notes reviewed [Yes]    ANTIMICROBIALS:   cefepime   IVPB 2000 every 12 hours      OTHER MEDS: MEDICATIONS  (STANDING):  acetaminophen     Tablet .. 650 every 6 hours PRN  albuterol/ipratropium for Nebulization 3 every 6 hours PRN  aluminum hydroxide/magnesium hydroxide/simethicone Suspension 30 every 4 hours PRN  aMIOdarone    Tablet 200 daily  apixaban 5 every 12 hours  atorvastatin 40 at bedtime  bumetanide Injectable 1 every 12 hours  clopidogrel Tablet 75 daily  dexMEDEtomidine Infusion 0.05 <Continuous>  dextrose 50% Injectable 25 once  dextrose 50% Injectable 12.5 once  dextrose 50% Injectable 25 once  dextrose Oral Gel 15 once PRN  ezetimibe 10 daily  famotidine    Tablet 20 daily  fentaNYL   Infusion. 0.508 <Continuous>  glucagon  Injectable 1 once  guaiFENesin  every 12 hours PRN  insulin glargine Injectable (LANTUS) 34 every morning  insulin lispro (ADMELOG) corrective regimen sliding scale  every 6 hours  melatonin 5 at bedtime PRN  norepinephrine Infusion 0.05 <Continuous>  ondansetron Injectable 4 every 8 hours PRN  propofol Infusion 5 <Continuous>      Vital Signs Last 24 Hrs  T(F): 100.4 (06-16-25 @ 08:34), Max: 101.7 (06-16-25 @ 02:54)    Vital Signs Last 24 Hrs  HR: 79 (06-16-25 @ 08:34) (62 - 100)  BP: 111/55 (06-16-25 @ 08:34) (64/37 - 157/70)  RR: 21 (06-16-25 @ 08:34)  SpO2: 100% (06-16-25 @ 08:34) (85% - 100%)  Wt(kg): --    EXAM:  GENERAL: On MV  HEAD: No head lesions  NECK: Supple  CHEST/LUNG: Shallow breath sounds   HEART: S1 S2  ABDOMEN: Soft, nontender +Mcmahon  EXTREMITIES: No clubbing  MSK: +2 edema   SKIN: No rashes or lesions, no superficial thrombophlebitis    Labs:                        13.4   33.02 )-----------( 335      ( 16 Jun 2025 06:28 )             44.5     06-16    140  |  102  |  56[H]  ----------------------------<  136[H]  4.7   |  23  |  2.3[H]    Ca    9.4      16 Jun 2025 06:28  Phos  4.6     06-15  Mg     2.8     06-16    TPro  6.5  /  Alb  3.5  /  TBili  0.4  /  DBili  x   /  AST  18  /  ALT  11  /  AlkPhos  100  06-16      WBC Trend:  WBC Count: 33.02 (06-16-25 @ 06:28)  WBC Count: 46.99 (06-15-25 @ 06:45)  WBC Count: 40.88 (06-14-25 @ 19:24)  WBC Count: 42.76 (06-14-25 @ 12:00)      Creatine Trend:  Creatinine: 2.3 (06-16)  Creatinine: 2.1 (06-15)  Creatinine: 2.0 (06-15)  Creatinine: 2.2 (06-15)      Liver Biochemical Testing Trend:  Alanine Aminotransferase (ALT/SGPT): 11 (06-16)  Alanine Aminotransferase (ALT/SGPT): 8 (06-15)  Alanine Aminotransferase (ALT/SGPT): 11 (06-14)  Alanine Aminotransferase (ALT/SGPT): TNP (06-14)  Alanine Aminotransferase (ALT/SGPT): 12 (06-14)  Aspartate Aminotransferase (AST/SGOT): 18 (06-16-25 @ 06:28)  Aspartate Aminotransferase (AST/SGOT): 12 (06-15-25 @ 06:45)  Aspartate Aminotransferase (AST/SGOT): 20 (06-14-25 @ 19:24)  Aspartate Aminotransferase (AST/SGOT): TNP (06-14-25 @ 12:00)  Aspartate Aminotransferase (AST/SGOT): 24 (06-14-25 @ 01:20)  Bilirubin Total: 0.4 (06-16)  Bilirubin Total: 0.4 (06-15)  Bilirubin Total: 0.4 (06-14)  Bilirubin Total: TNP (06-14)  Bilirubin Total: 0.4 (06-14)      Trend LDH  06-15-25 @ 06:45  375[H]      Urinalysis Basic - ( 16 Jun 2025 06:28 )    Color: x / Appearance: x / SG: x / pH: x  Gluc: 136 mg/dL / Ketone: x  / Bili: x / Urobili: x   Blood: x / Protein: x / Nitrite: x   Leuk Esterase: x / RBC: x / WBC x   Sq Epi: x / Non Sq Epi: x / Bacteria: x        MICROBIOLOGY:  Vancomycin Level, Random: 14.5 (06-15 @ 06:45)    Male    Culture - Sputum (collected 15 Kleber 2025 16:44)  Source: Sputum Sputum    Culture - Blood (collected 14 Jun 2025 15:36)  Source: Blood None  Preliminary Report:    No growth at 24 hours    Culture - Blood (collected 02 Jun 2025 18:05)  Source: Blood Blood  Final Report:    No growth at 5 days    Culture - Blood (collected 02 Jun 2025 18:05)  Source: Blood Blood  Final Report:    No growth at 5 days    Culture - Blood (collected 18 Apr 2025 22:18)  Source: Blood Blood  Final Report:    No growth at 5 days    Culture - Blood (collected 18 Apr 2025 22:17)  Source: Blood Blood  Final Report:    No growth at 5 days    Culture - Blood (collected 28 Mar 2025 20:45)  Source: Blood Blood-Peripheral  Final Report:    No growth at 5 days    Culture - Blood (collected 31 Dec 2024 15:45)  Source: .Blood BLOOD  Final Report:    No growth at 5 days    Culture - Blood (collected 03 Sep 2023 12:50)  Source: .Blood Blood  Final Report:    No growth at 5 days    Culture - Blood (collected 03 Sep 2023 12:50)  Source: .Blood Blood  Final Report:    No growth at 5 days      HIV-1/2 Combo Result: Nonreact (01-04-25 @ 05:58)    COVID-19 PCR: NotDetec (05-15-25 @ 09:50)      Procalcitonin: 1.49 (06-14)  Lactate Dehydrogenase, Serum: 375 (06-15)      Troponin T, High Sensitivity Result: 121 (06-15)  Troponin T, High Sensitivity Result: 141 (06-14)  Troponin T, High Sensitivity Result: 134 (06-14)  Troponin T, High Sensitivity Result: 137 (06-14)  Troponin T, High Sensitivity Result: 121 (06-14)  Troponin T, High Sensitivity Result: 113 (06-14)    Blood Gas Arterial, Lactate: 1.5 (06-16 @ 04:30)  Lactate, Blood: 1.6 (06-15 @ 06:45)  Blood Gas Arterial, Lactate: 1.1 (06-15 @ 04:30)  Blood Gas Arterial, Lactate: 1.4 (06-14 @ 16:54)        RADIOLOGY & ADDITIONAL TESTS:  I have personally reviewed the relevant images.   CXR  Xray Chest 1 View AP/PA:   ACC: [id number] EXAM:  XR CHEST 1 VIEW   ORDERED BY: RADHA VICTORIA     PROCEDURE DATE:  06/13/2025          INTERPRETATION:  CLINICAL HISTORY: Shortness of breath    COMPARISON: Frontal chest June 5, 2025    TECHNIQUE: Portable frontal chest radiograph.    FINDINGS:    Support devices: Stable position left ICD.    Cardiac/mediastinum/hilum: Cardiomegaly, thoracic aortic and coronary   artery calcification, status post median sternotomy, aortic valve   replacement..    Lung parenchyma/Pleura: Bilateral opacities/pleural effusions    Skeleton/soft tissues: Stable bony structures. Epigastric surgical clips.      IMPRESSION:    Bilateral opacities/pleural effusion, increased. Redemonstration   cardiomegaly.    --- End of Report ---      TORI CHRISTIANSEN MD; Attending Radiologist  This document has been electronically signed. Jun 14 2025  5:35AM (06-13-25 @ 23:45)      CT    < from: Xray Chest 1 View- PORTABLE-Routine (Xray Chest 1 View- PORTABLE-Routine in AM.) (06.15.25 @ 07:33) >  INTERPRETATION:  CLINICAL HISTORY: Follow-up.    COMPARISON: June 14, 2025.    TECHNIQUE: Portable frontal chest radiograph. Low lung volume.    FINDINGS:    Support devices: ETT with its tip above the oscar, NGT with its tip   below the diaphragm and a left-sided permanent pacemaker.    Cardiac/mediastinum/hilum: Stable.    Lung parenchyma/Pleura: Bilateral opacities, unchanged. No pneumothorax   is seen.    Skeleton/soft tissues: Stable.      IMPRESSION: Low lung volume.    Bilateral opacities, unchanged.    Support tubes as above.    Left-sided permanent pacemaker.    --- End of Report ---    < end of copied text >      WEIGHT  Weight (kg): 83.3 (06-14-25 @ 08:00)  Creatinine: 2.3 mg/dL (06-16-25 @ 06:28)  Creatinine: 2.1 mg/dL (06-15-25 @ 19:31)  Creatinine: 2.0 mg/dL (06-15-25 @ 15:39)      All available historical records have been reviewed

## 2025-06-16 NOTE — PROGRESS NOTE ADULT - ASSESSMENT
IMPRESSION:    Acute decompensated heart failure  Acute pulmonary edema  Not COPD exacerbation, doubt pneumonia given rapid improvement with NIV and lasix  pulm HTN multifactorial secondary to cardiomyopathy reduced EF, likely LAW, baseline COPD  chronic hypoxia from all the above  B/L small pleural effusions to  pulmonary edema    PLAN:    CNS: Spontaneous awakening trial, Seroquel q HS, f/u qtc     HEENT: Oral care    PULMONARY:  HOB @ 45 degrees.  Vent changes as follows: reduce RR and TV, repeat ABG. CXR noted, Duonebs q 6 hrs     CARDIOVASCULAR: f/u echo, Bumex per cards, f/u UO, f/u echo - c/w AC, BB and amiodarone, needs ischemic eval,    GI: GI prophylaxis. Feeding. Bowel regimen     RENAL:  Follow up lytes.  Correct as needed, f/u Bun/Cr, renal US, midodrine to wean off levo      INFECTIOUS DISEASE: Follow up cultures, MRSA negative, finish Rocephin     HEMATOLOGICAL: DVT prophylaxis. On AC     ENDOCRINE:  Follow up FS.  Insulin protocol if needed.    MUSCULOSKELETAL: PT when extubated       MICU

## 2025-06-16 NOTE — CONSULT NOTE ADULT - CONSULT REASON
Colonic perforation as noted  45 minutes critical care as follows  The patient arrived to SICU intubated due to post operative respiratory failure  placed on Precedex drip with Dilaudid PRN  Closely monitoring hemodynamics.  If continues to stabilize will work toward obtaining breathing trial and possible extubation GOC

## 2025-06-16 NOTE — PROGRESS NOTE ADULT - SUBJECTIVE AND OBJECTIVE BOX
Subjective/Objective:     HPI-Cardiology/Events/Updates  Pt evaluated at bedside.   Patient remains intubated.   creatinine uptrending to 2.3 on bumex 1mg BID.       MEDICATIONS  (STANDING):  aMIOdarone    Tablet 200 milliGRAM(s) Oral daily  apixaban 5 milliGRAM(s) Oral every 12 hours  atorvastatin 40 milliGRAM(s) Oral at bedtime  bumetanide Injectable 2 milliGRAM(s) IV Push every 12 hours  cefepime   IVPB 2000 milliGRAM(s) IV Intermittent every 12 hours  chlorhexidine 0.12% Liquid 15 milliLiter(s) Oral Mucosa every 12 hours  chlorhexidine 2% Cloths 1 Application(s) Topical <User Schedule>  clopidogrel Tablet 75 milliGRAM(s) Oral daily  dexMEDEtomidine Infusion 0.4 MICROgram(s)/kG/Hr (8.45 mL/Hr) IV Continuous <Continuous>  dextrose 5%. 1000 milliLiter(s) (100 mL/Hr) IV Continuous <Continuous>  dextrose 5%. 1000 milliLiter(s) (50 mL/Hr) IV Continuous <Continuous>  dextrose 50% Injectable 25 Gram(s) IV Push once  dextrose 50% Injectable 12.5 Gram(s) IV Push once  dextrose 50% Injectable 25 Gram(s) IV Push once  ezetimibe 10 milliGRAM(s) Oral daily  famotidine    Tablet 20 milliGRAM(s) Oral daily  fentaNYL   Infusion. 0.5 MICROgram(s)/kG/Hr (4.23 mL/Hr) IV Continuous <Continuous>  glucagon  Injectable 1 milliGRAM(s) IntraMuscular once  insulin glargine Injectable (LANTUS) 34 Unit(s) SubCutaneous every morning  insulin lispro (ADMELOG) corrective regimen sliding scale   SubCutaneous every 6 hours  norepinephrine Infusion 0.05 MICROgram(s)/kG/Min (7.92 mL/Hr) IV Continuous <Continuous>  propofol Infusion 5 MICROgram(s)/kG/Min (2.54 mL/Hr) IV Continuous <Continuous>  tiotropium 2.5 MICROgram(s) Inhaler 2 Puff(s) Inhalation daily    MEDICATIONS  (PRN):  acetaminophen     Tablet .. 650 milliGRAM(s) Oral every 6 hours PRN Temp greater or equal to 38C (100.4F), Mild Pain (1 - 3)  albuterol/ipratropium for Nebulization 3 milliLiter(s) Nebulizer every 6 hours PRN Shortness of Breath and/or Wheezing  aluminum hydroxide/magnesium hydroxide/simethicone Suspension 30 milliLiter(s) Oral every 4 hours PRN Dyspepsia  dextrose Oral Gel 15 Gram(s) Oral once PRN Blood Glucose LESS THAN 70 milliGRAM(s)/deciliter  guaiFENesin  milliGRAM(s) Oral every 12 hours PRN for cough  melatonin 5 milliGRAM(s) Oral at bedtime PRN Insomnia  ondansetron Injectable 4 milliGRAM(s) IV Push every 8 hours PRN Nausea and/or Vomiting          Vital Signs Last 24 Hrs  ICU Vital Signs Last 24 Hrs  T(C): 37.9 (16 Jun 2025 11:00), Max: 38.7 (16 Jun 2025 02:54)  T(F): 100.2 (16 Jun 2025 11:00), Max: 101.7 (16 Jun 2025 02:54)  HR: 79 (16 Jun 2025 08:34) (62 - 100)  BP: 111/55 (16 Jun 2025 08:34) (64/37 - 157/70)  BP(mean): 79 (16 Jun 2025 08:34) (46 - 101)  ABP: --  ABP(mean): --  RR: 19 (16 Jun 2025 11:00) (19 - 40)  SpO2: 100% (16 Jun 2025 08:34) (85% - 100%)    O2 Parameters below as of 16 Jun 2025 08:34  Patient On (Oxygen Delivery Method): ventilator    O2 Concentration (%): 40    I&O's Summary    15 Kleber 2025 07:01  -  16 Jun 2025 07:00  --------------------------------------------------------  IN: 2687 mL / OUT: 1475 mL / NET: 1212 mL    16 Jun 2025 07:01  -  16 Jun 2025 13:46  --------------------------------------------------------  IN: 0 mL / OUT: 670 mL / NET: -670 mL          PHYSICAL EXAM:  GENERAL:  71y/o Male NAD  HEAD:  Atraumatic, Normocephalic  EYES: EOMI, PERRLA, conjunctiva and sclera clear  NECK: Supple, No JVD, no cervical lymphadenopathy, non-tender  CHEST/LUNG: Rales to auscultation bilaterally  HEART: Regular rate and rhythm; S1&S2  ABDOMEN: Soft, Nontender, Nondistended x 4 quadrants; Bowel sounds present  EXTREMITIES:  no EDEMA NOTED on b/l LE   NEUROLOGY: WNL  SKIN: Cool extremity on pressors        EKG/ TELEM:  < from: 12 Lead ECG (06.14.25 @ 04:54) >   Atrial-sensed ventricular-paced rhythm  Biventricular pacemaker detected  Abnormal ECG    < end of copied text >          LABS:         - Labs:                        13.4   33.02 )-----------( 335      ( 16 Jun 2025 06:28 )             44.5     06-16    140  |  102  |  56[H]  ----------------------------<  136[H]  4.7   |  23  |  2.3[H]    Ca    9.4      16 Jun 2025 06:28  Phos  4.6     06-15  Mg     2.8     06-16    TPro  6.5  /  Alb  3.5  /  TBili  0.4  /  DBili  x   /  AST  18  /  ALT  11  /  AlkPhos  100  06-16    LIVER FUNCTIONS - ( 16 Jun 2025 06:28 )  Alb: 3.5 g/dL / Pro: 6.5 g/dL / ALK PHOS: 100 U/L / ALT: 11 U/L / AST: 18 U/L / GGT: x           PT/INR - ( 15 Kleber 2025 06:45 )   PT: 21.30 sec;   INR: 1.78 ratio           ABG - ( 16 Jun 2025 04:30 )  pH, Arterial: 7.44  pH, Blood: x     /  pCO2: 29    /  pO2: 160   / HCO3: 20    / Base Excess: -3.4  /  SaO2: 99.6        Lactate Trend  06-15 @ 06:45 Lactate:1.6   06-14 @ 12:00 Lactate:3.1     Urinalysis Basic - ( 16 Jun 2025 06:28 )    Color: x / Appearance: x / SG: x / pH: x  Gluc: 136 mg/dL / Ketone: x  / Bili: x / Urobili: x   Blood: x / Protein: x / Nitrite: x   Leuk Esterase: x / RBC: x / WBC x   Sq Epi: x / Non Sq Epi: x / Bacteria: x               Diagnostic testing:  < from: Xray Chest 1 View- PORTABLE-Routine (Xray Chest 1 View- PORTABLE-Routine in AM.) (06.15.25 @ 07:33) >  FINDINGS:    Support devices: ETT with its tip above the oscar, NGT with its tip   below the diaphragm and a left-sided permanent pacemaker.    Cardiac/mediastinum/hilum: Stable.    Lung parenchyma/Pleura: Bilateral opacities, unchanged. No pneumothorax   is seen.    Skeleton/soft tissues: Stable.      IMPRESSION: Low lung volume.    Bilateral opacities, unchanged.    Support tubes as above.    Left-sided permanent pacemaker.    --- End of Report ---    < end of copied text >          < from: TTE Echo Complete w/o Contrast w/ Doppler (04.20.25 @ 13:14) >    Summary:   1. LV Ejection Fraction by Beck's Method with a biplane EF of 30 %.   2. Severely decreased global left ventricular systolic function.   3. Endocardial visualization was enhanced with intravenous echo contrast.   4. Left atrial enlargement.   5. Mildly reduced RV systolic function.   6. Mild mitral valve regurgitation.   7. Thickening of the anterior and posterior mitral valve leaflets.   8. Moderate tricuspid regurgitation.   9. Bioprosthetic aortic valve in place. Peak/mean [id number]/15.3   mmHg.  10. Estimated pulmonary artery systolic pressure is 59.9 mmHg assuming a   right atrial pressure of 3 mmHg, which is consistent with moderate   pulmonary hypertension.  11. Likely PFO noted by color doppler wtih left to right shunt.    < end of copied text >        Assessment and Recommendations:   70 year old male with past medical history of  COPD (on 2L home O2), DM 2  HFrEF 30%, CAD,  ischemic cardiomyopathy s/p CRT-D, HTN, paroxysmal A-fib (status post ablation 1/14/25) on amiodarone and  Eliquis who presented to ED w c/o LESLIE and b/l  leg swelling.         Impression:  #SOB/LESLIE: Multifactorial  #HFrEF (EF 30%, s/p CRT-D)  #CAD s/p PCI to RCA and LAD  #NSTEMI/ Hx of frequent NSVT   #Paroxysmal AFib on Eliquis      Plan:   Currently intubated, mechanically ventilated, and sedated. IVC on echo noted to be nondilated. no leg edema. Although with cool extremites on vasopressor. lactate 1.5 today  - s/p x1 dose of bumex 1mg IVC today. reassess volume status in AM and consider repeating CXR.   - wean off pressors as able  -  monitor end organ perfusion markers closely ( LFT, renal function, Lactate)   - Check BMP daily and monitor renal function. Maintain K >4.0, Mg >2.2    - Strict intake and output, maintain negative balance for now  - For Afib - c/w AC, BB and amiodarone   - Restart home GDMT for HFrEF, when stable/medically appropriate   - Pt needs ischemic eval, but has refused in the past   - appreciate Infectious disease recs

## 2025-06-16 NOTE — DIETITIAN INITIAL EVALUATION ADULT - NSFNSGIIOFT_GEN_A_CORE
06-15-25 @ 07:01  -  06-16-25 @ 07:00  --------------------------------------------------------  OUT:  Total OUT: 0 mL    Total NET: 1250 mL      06-16-25 @ 07:01  -  06-16-25 @ 16:44  --------------------------------------------------------  OUT:  Total OUT: 0 mL    Total NET: 300 mL

## 2025-06-16 NOTE — CONSULT NOTE ADULT - ASSESSMENT
70M with PMH of COPD, DM2, HFrEF, CAD, ICM s/p CRT-D, HTN, pAF< eliquis here with LESLIE and bilateral leg swelling, and found to have acute HFrEF and pulmonary edema, on vent, nebs, steroids, and bumex. Will need ischemic eval eventually. Patient was DNR/DNI/trial NIV on recent admission after d/w palliative, but wife opted this admission for intubation. Palliative c/s for GOC.   70M with PMH of COPD, DM2, HFrEF, CAD, ICM s/p CRT-D, HTN, pAF< eliquis here with LESLIE and bilateral leg swelling, and found to have acute HFrEF and pulmonary edema, on vent, nebs, steroids, and bumex. Will need ischemic eval eventually. Patient was DNR/DNI/trial NIV on recent admission after d/w palliative, but wife opted this admission for intubation. Palliative c/s for GOC.    - left message for wife with palliative callback to discuss GOC  - c/w fentanyl, precedex gtt  - c/w bumex  - no acute symptoms  - will follow    ______________  Wolfgang Ramos MD  Palliative Medicine  [facility]   [facility]  [phone]

## 2025-06-16 NOTE — DIETITIAN INITIAL EVALUATION ADULT - ENTERAL KCAL
Mom called states Fleming's recent blood work showed he is not allergic to seafood. Mother wants to know how that is possible when in Dec 23, 2023 he tested and result came back as severely allergic. Best number to reach is [phone]. Please advise.    2100

## 2025-06-16 NOTE — DIETITIAN INITIAL EVALUATION ADULT - PERTINENT MEDS FT
MEDICATIONS  (STANDING):  aMIOdarone    Tablet 200 milliGRAM(s) Oral daily  apixaban 5 milliGRAM(s) Enteral Tube two times a day  atorvastatin 40 milliGRAM(s) Oral at bedtime  cefepime   IVPB 1000 milliGRAM(s) IV Intermittent every 12 hours  clopidogrel Tablet 75 milliGRAM(s) Enteral Tube daily  dexMEDEtomidine Infusion 0.05 MICROgram(s)/kG/Hr (1.04 mL/Hr) IV Continuous <Continuous>  dextrose 5%. 1000 milliLiter(s) (100 mL/Hr) IV Continuous <Continuous>  dextrose 5%. 1000 milliLiter(s) (50 mL/Hr) IV Continuous <Continuous>  ezetimibe 10 milliGRAM(s) Oral daily  famotidine    Tablet 20 milliGRAM(s) Oral daily  fentaNYL   Infusion. 0.508 MICROgram(s)/kG/Hr (4.23 mL/Hr) IV Continuous <Continuous>  glucagon  Injectable 1 milliGRAM(s) IntraMuscular once  insulin glargine Injectable (LANTUS) 34 Unit(s) SubCutaneous every morning  insulin lispro (ADMELOG) corrective regimen sliding scale   SubCutaneous every 6 hours  midodrine 10 milliGRAM(s) Oral every 8 hours  mupirocin 2% Nasal 1 Application(s) Both Nostrils every 12 hours  norepinephrine Infusion 0.05 MICROgram(s)/kG/Min (7.92 mL/Hr) IV Continuous <Continuous>  propofol Infusion 5 MICROgram(s)/kG/Min (2.54 mL/Hr) IV Continuous <Continuous>  QUEtiapine 25 milliGRAM(s) Oral at bedtime    MEDICATIONS  (PRN):  acetaminophen     Tablet .. 650 milliGRAM(s) Oral every 6 hours PRN Temp greater or equal to 38C (100.4F), Mild Pain (1 - 3)  albuterol/ipratropium for Nebulization 3 milliLiter(s) Nebulizer every 6 hours PRN Shortness of Breath and/or Wheezing  aluminum hydroxide/magnesium hydroxide/simethicone Suspension 30 milliLiter(s) Oral every 4 hours PRN Dyspepsia  dextrose Oral Gel 15 Gram(s) Oral once PRN Blood Glucose LESS THAN 70 milliGRAM(s)/deciliter  guaiFENesin Oral Liquid (Sugar-Free) 200 milliGRAM(s) Oral every 6 hours PRN Cough  melatonin 5 milliGRAM(s) Oral at bedtime PRN Insomnia  ondansetron Injectable 4 milliGRAM(s) IV Push every 8 hours PRN Nausea and/or Vomiting

## 2025-06-16 NOTE — CONSULT NOTE ADULT - SUBJECTIVE AND OBJECTIVE BOX
HPI: 70M with PMH of COPD, DM2, HFrEF, CAD, ICM s/p CRT-D, HTN, pAF< eliquis here with LESLIE and bilateral leg swelling, and found to have acute HFrEF and pulmonary edema, on vent, nebs, steroids, and bumex. Will need ischemic eval eventually. Patient was DNR/DNI/trial NIV on recent admission after d/w palliative, but wife opted this admission for intubation. Palliative c/s for GOC.      PERTINENT PM/SXH:   CHF (congestive heart failure)    Diabetes    CAD (coronary artery disease)    Chronic obstructive pulmonary disease (COPD)    HTN (hypertension)    Stented coronary artery    Dyslipidemia    GERD (gastroesophageal reflux disease)    Afib    CVA (cerebral vascular accident)      H/O heart artery stent    Heart valve replaced    History of Nissen fundoplication      FAMILY HISTORY:  no pertinent family history      SOCIAL HISTORY:   Significant other/partner[ ]  Children[ ]  Denominational/Spirituality:  Substance hx:  [ ]   Tobacco hx:  [ ]   Alcohol hx: [ ]   Living Situation: [ ]Home  [ ]Long term care  [ ]Rehab [ ]Other  Home Services: [ ] HHA [ ] Amaury RN [ ] Hospice  Occupation:  Home Opioid hx:  [ ] Y [ ] N [ ] I-Stop Reference No: [id number]    A	N		05/01/2025	05/01/2025	eszopiclone 3 mg tablet	30	30	Martin Shaver DO	[id number]	Medicare	[facility] [id number]  A	N		03/03/2025	03/19/2025	eszopiclone 3 mg tablet	30	30	Martin Shaver DO	[id number]	Medicare	[facility] [id number]  A	N		02/11/2025	02/13/2025	eszopiclone 3 mg tablet	30	30	Martin Shaver DO	[id number]	Medicare	[facility] [id number]  A	N		07/08/2024	12/12/2024	eszopiclone 3 mg tablet	30	30	Martin Shaver DO	[id number]	Medicare	[facility] [id number]    ADVANCE DIRECTIVES:    [ ] Full Code [ ] DNR  MOLST  [ ]  Living Will  [ ]   DECISION MAKER(s):  [ ] Health Care Proxy(s)  [ ] Surrogate(s)  [ ] Guardian           Name(s): Phone Number(s):    BASELINE (I)ADL(s) (prior to admission):  [place]: [ ]Total  [ ] Moderate [ ]Dependent  Palliative Performance Status Version 2:         %    [email or web address]/files/news/palliative_performance_scale_[email or web address].pdf    Allergies    Bactrim (Unknown)  sulfa drugs (Unknown)    Intolerances    MEDICATIONS  (STANDING):  aMIOdarone    Tablet 200 milliGRAM(s) Oral daily  apixaban 5 milliGRAM(s) Oral every 12 hours  atorvastatin 40 milliGRAM(s) Oral at bedtime  cefepime   IVPB 1000 milliGRAM(s) IV Intermittent every 12 hours  chlorhexidine 0.12% Liquid 15 milliLiter(s) Oral Mucosa every 12 hours  chlorhexidine 2% Cloths 1 Application(s) Topical <User Schedule>  clopidogrel Tablet 75 milliGRAM(s) Oral daily  dexMEDEtomidine Infusion 0.05 MICROgram(s)/kG/Hr (1.04 mL/Hr) IV Continuous <Continuous>  dextrose 5%. 1000 milliLiter(s) (100 mL/Hr) IV Continuous <Continuous>  dextrose 5%. 1000 milliLiter(s) (50 mL/Hr) IV Continuous <Continuous>  dextrose 50% Injectable 25 Gram(s) IV Push once  dextrose 50% Injectable 12.5 Gram(s) IV Push once  dextrose 50% Injectable 25 Gram(s) IV Push once  ezetimibe 10 milliGRAM(s) Oral daily  famotidine    Tablet 20 milliGRAM(s) Oral daily  fentaNYL   Infusion. 0.508 MICROgram(s)/kG/Hr (4.23 mL/Hr) IV Continuous <Continuous>  glucagon  Injectable 1 milliGRAM(s) IntraMuscular once  insulin glargine Injectable (LANTUS) 34 Unit(s) SubCutaneous every morning  insulin lispro (ADMELOG) corrective regimen sliding scale   SubCutaneous every 6 hours  midodrine 10 milliGRAM(s) Oral every 8 hours  mupirocin 2% Nasal 1 Application(s) Both Nostrils every 12 hours  norepinephrine Infusion 0.05 MICROgram(s)/kG/Min (7.92 mL/Hr) IV Continuous <Continuous>  propofol Infusion 5 MICROgram(s)/kG/Min (2.54 mL/Hr) IV Continuous <Continuous>  QUEtiapine 25 milliGRAM(s) Oral at bedtime    MEDICATIONS  (PRN):  acetaminophen     Tablet .. 650 milliGRAM(s) Oral every 6 hours PRN Temp greater or equal to 38C (100.4F), Mild Pain (1 - 3)  albuterol/ipratropium for Nebulization 3 milliLiter(s) Nebulizer every 6 hours PRN Shortness of Breath and/or Wheezing  aluminum hydroxide/magnesium hydroxide/simethicone Suspension 30 milliLiter(s) Oral every 4 hours PRN Dyspepsia  dextrose Oral Gel 15 Gram(s) Oral once PRN Blood Glucose LESS THAN 70 milliGRAM(s)/deciliter  guaiFENesin  milliGRAM(s) Oral every 12 hours PRN for cough  melatonin 5 milliGRAM(s) Oral at bedtime PRN Insomnia  ondansetron Injectable 4 milliGRAM(s) IV Push every 8 hours PRN Nausea and/or Vomiting      PRESENT SYMPTOMS: [ ]Unable to obtain due to poor mentation   Source if other than patient:  [ ]Family   [ ]Team   [ X]All review of systems negative including pain and dyspnea unless noted below    All components of pain assessment below addressed. Blank spaces indicate that the patient did/could not complete the assessment.  Pain: [ ]yes [ ]no  QOL impact -   Location -                    Aggravating factors -  Quality -  Radiation -  Timing-  Severity (0-10 scale):  Minimal acceptable level (0-10 scale):     CPOT:    https://www.[facility].org/getattachment/[email or web address]/Critical-Care-Pain-Observation-Tool-(CPOT)    PAIN AD Score:   http://geriatrictoolkit.[place].[facility]/cog/painad.pdf (press ctrl +  left click to view)    Dyspnea:                           [ ]None[ ]Mild [ ]Moderate [ ]Severe     Respiratory Distress Observation Scale (RDOS):   A score of 0 to 2 signifies little or no respiratory distress, 3 signifies mild distress, scores 4 to 6 indicate moderate distress, and scores greater than 7 signify severe distress  https://www.[facility].ca/sites/default/files/PDFS/037729-opkfocdhxda-ftcfyfoz-ofxiyhqabay-unxkn.pdf    Anxiety:                             [ ]None[ ]Mild [ ]Moderate [ ]Severe   Fatigue:                             [ ]None[ ]Mild [ ]Moderate [ ]Severe   Nausea:                             [ ]None[ ]Mild [ ]Moderate [ ]Severe   Loss of appetite:              [ ]None[ ]Mild [ ]Moderate [ ]Severe   Constipation:                    [ ]None[ ]Mild [ ]Moderate [ ]Severe    Other Symptoms:    PHYSICAL EXAM:  Vital Signs Last 24 Hrs  T(C): 38 (16 Jun 2025 08:34), Max: 38.7 (16 Jun 2025 02:54)  T(F): 100.4 (16 Jun 2025 08:34), Max: 101.7 (16 Jun 2025 02:54)  HR: 79 (16 Jun 2025 08:34) (62 - 100)  BP: 111/55 (16 Jun 2025 08:34) (64/37 - 157/70)  BP(mean): 79 (16 Jun 2025 08:34) (46 - 101)  RR: 21 (16 Jun 2025 08:34) (20 - 40)  SpO2: 100% (16 Jun 2025 08:34) (85% - 100%)    Parameters below as of 16 Jun 2025 08:34  Patient On (Oxygen Delivery Method): ventilator    O2 Concentration (%): 40 I&O's Summary    15 Kleber 2025 07:01  -  16 Jun 2025 07:00  --------------------------------------------------------  IN: 2687 mL / OUT: 1475 mL / NET: 1212 mL    16 Jun 2025 07:01  -  16 Jun 2025 11:29  --------------------------------------------------------  IN: 0 mL / OUT: 395 mL / NET: -395 mL        GENERAL:  [X ] No acute distress [ ]Lethargic  [ ]Unarousable  [ ]Verbal  [ ]Non-Verbal [ ]Cachexia    BEHAVIORAL/PSYCH:  [ ]Alert and Oriented x  [ ] Anxiety [ ] Delirium [ ] Agitation [X ] Calm   EYES: [ ] No scleral icterus [ ] Scleral icterus [ ] Closed  ENMT:  [ ]Dry mouth  [ ]No external oral lesions [ X] No external ear or nose lesions  CARDIOVASCULAR:  [ ]Regular [ ]Irregular [ ]Tachy [ ]Not Tachy  [ ]Aubrey [ ] Edema [ ] No edema  PULMONARY:  [ ]Tachypnea  [ ]Audible excessive secretions [X ] No labored breathing [ ] labored breathing  GASTROINTESTINAL: [ ]Soft  [ ]Distended  [ X]Not distended [ ]Non tender [ ]Tender  MUSCULOSKELETAL: [ ]No clubbing [ ] clubbing  [ X] No cyanosis [ ] cyanosis  NEUROLOGIC: [ ]No focal deficits  [ ]Follows commands  [ ]Does not follow commands  [ ]Cognitive impairment  [ ]Dysphagia  [ ]Dysarthria  [ ]Paresis   SKIN: [ ] Jaundiced [X ] Non-jaundiced [ ]Rash [ ]No Rash [ ] Warm [ ] Dry  MISC/LINES: [ ] ET tube [ ] Trach [ ]NGT/OGT [ ]PEG [ ]Mcmahon    CRITICAL CARE:  [ ] Shock Present  [ ]Septic [ ]Cardiogenic [ ]Neurologic [ ]Hypovolemic  [ ]  Vasopressors [ ]  Inotropes   [ ]Respiratory failure present [ ]Mechanical ventilation [ ]Non-invasive ventilatory support [ ]High flow  [ ]Acute  [ ]Chronic [ ]Hypoxic  [ ]Hypercarbic [ ]Other  [ ]Other organ failure     LABS: reviewed by me, notable for: elevated WBC, SCr; UA WNL                        13.4   33.02 )-----------( 335      ( 16 Jun 2025 06:28 )             44.5   06-16    140  |  102  |  56[H]  ----------------------------<  136[H]  4.7   |  23  |  2.3[H]    Ca    9.4      16 Jun 2025 06:28  Phos  4.6     06-15  Mg     2.8     06-16    TPro  6.5  /  Alb  3.5  /  TBili  0.4  /  DBili  x   /  AST  18  /  ALT  11  /  AlkPhos  100  06-16  PT/INR - ( 15 Kleber 2025 06:45 )   PT: 21.30 sec;   INR: 1.78 ratio             Urinalysis Basic - ( 16 Jun 2025 06:28 )    Color: x / Appearance: x / SG: x / pH: x  Gluc: 136 mg/dL / Ketone: x  / Bili: x / Urobili: x   Blood: x / Protein: x / Nitrite: x   Leuk Esterase: x / RBC: x / WBC x   Sq Epi: x / Non Sq Epi: x / Bacteria: x      RADIOLOGY & ADDITIONAL STUDIES: CXR personally reviewed by me: 6/16: possibly b/l opacities    PROTEIN CALORIE MALNUTRITION PRESENT: [ ]mild [ ]moderate [ ]severe [ ]underweight [ ]morbid obesity  https://www.andeal.org/vault/2440/web/files/ONC/Table_Clinical%20Characteristics%20to%20Document%20Malnutrition-White%20JV%20et%20al%296989.pdf    Height (cm): 160 (06-14-25 @ 08:00), 162.6 (06-03-25 @ 14:33), 160 (05-30-25 @ 11:11)  Weight (kg): 83.3 (06-14-25 @ 08:00), 75.9 (06-03-25 @ 14:33), 75.7 (05-30-25 @ 11:11)  BMI (kg/m2): 32.5 (06-14-25 @ 08:00), 28.7 (06-03-25 @ 14:33), 29.6 (05-30-25 @ 11:11)    [ ]PPSV2 < or = to 30% [ ]significant weight loss  [ ]poor nutritional intake  [ ]anasarca      [ ]Artificial Nutrition          Palliative Care Spiritual/Emotional Screening Tool Question  Severity (0-4):                    OR                    [X ] Unable to determine/NA  Score of 2 or greater indicates recommendation of Chaplaincy referral  Chaplaincy Referral: [ ] Yes [ ] Refused [ ] Following     Caregiver [place]:  [ ] Yes [ ] No    OR    [x ] Unable to determine. Will assess at later time if appropriate.  Social Work Referral [ ]  Patient and Family Centered Care Referral [ ]    Anticipatory Grief Present: [ ] Yes [ ] No    OR     [ x] Unable to determine. Will assess at later time if appropriate.  Social Work Referral [ ]  Patient and Family Centered Care Referral [ ]    REFERRALS:   [ ]Chaplaincy  [ ]Hospice  [ ]Child Life  [ ]Social Work  [ ]Case management [ ]Holistic Therapy     Palliative care education provided to patient and/or family    Goals of Care Document:     ______________  Wolfgang Ramos MD  Palliative Medicine  [facility]   of Geriatric and Palliative Medicine  [phone]   HPI: 70M with PMH of COPD, DM2, HFrEF, CAD, ICM s/p CRT-D, HTN, pAF< eliquis here with LESLIE and bilateral leg swelling, and found to have acute HFrEF and pulmonary edema, on vent, nebs, steroids, and bumex. Will need ischemic eval eventually. Patient was DNR/DNI/trial NIV on recent admission after d/w palliative, but wife opted this admission for intubation. Palliative c/s for GOC.    PERTINENT PM/SXH:   CHF (congestive heart failure)    Diabetes    CAD (coronary artery disease)    Chronic obstructive pulmonary disease (COPD)    HTN (hypertension)    Stented coronary artery    Dyslipidemia    GERD (gastroesophageal reflux disease)    Afib    CVA (cerebral vascular accident)      H/O heart artery stent    Heart valve replaced    History of Nissen fundoplication      FAMILY HISTORY:  no pertinent family history      SOCIAL HISTORY:   Significant other/partner[X ]  Children[ ]  [place]/Spirituality:  Substance hx:  [ ]   Tobacco hx:  [ ]   Alcohol hx: [ ]   Living Situation: [ ]Home  [ ]Long term care  [ ]Rehab [ ]Other  Home Services: [ ] HHA [ ] Amaury RN [ ] Hospice  Occupation:  Home Opioid hx:  [ ] Y [ ] N [ ] I-Stop Reference No: [id number]    A	N		05/01/2025	05/01/2025	eszopiclone 3 mg tablet	30	30	Martin Shaver DO	[id number]	Medicare	[facility] [id number]  A	N		03/03/2025	03/19/2025	eszopiclone 3 mg tablet	30	30	Martin Shaver DO	[id number]	Medicare	[facility] [id number]  A	N		02/11/2025	02/13/2025	eszopiclone 3 mg tablet	30	30	Martin Shaver DO	[id number]	Medicare	[facility] [id number]  A	N		07/08/2024	12/12/2024	eszopiclone 3 mg tablet	30	30	Martin Shaver DO	[id number]	Medicare	[facility] [id number]    ADVANCE DIRECTIVES:    [X ] Full Code [ ] DNR  MOLST  [ ]  Living Will  [ ]   DECISION MAKER(s):  [ ] Health Care Proxy(s)  [ ] Surrogate(s)  [ ] Guardian           Name(s): Phone Number(s): wife    BASELINE (I)ADL(s) (prior to admission):  [place]: [ ]Total  [ ] Moderate [ ]Dependent  Palliative Performance Status Version 2:         %    [email or web address]/[email or web address]_performance_scale_[email or web address].pdf    Allergies    Bactrim (Unknown)  sulfa drugs (Unknown)    Intolerances    MEDICATIONS  (STANDING):  aMIOdarone    Tablet 200 milliGRAM(s) Oral daily  apixaban 5 milliGRAM(s) Oral every 12 hours  atorvastatin 40 milliGRAM(s) Oral at bedtime  cefepime   IVPB 1000 milliGRAM(s) IV Intermittent every 12 hours  chlorhexidine 0.12% Liquid 15 milliLiter(s) Oral Mucosa every 12 hours  chlorhexidine 2% Cloths 1 Application(s) Topical <User Schedule>  clopidogrel Tablet 75 milliGRAM(s) Oral daily  dexMEDEtomidine Infusion 0.05 MICROgram(s)/kG/Hr (1.04 mL/Hr) IV Continuous <Continuous>  dextrose 5%. 1000 milliLiter(s) (100 mL/Hr) IV Continuous <Continuous>  dextrose 5%. 1000 milliLiter(s) (50 mL/Hr) IV Continuous <Continuous>  dextrose 50% Injectable 25 Gram(s) IV Push once  dextrose 50% Injectable 12.5 Gram(s) IV Push once  dextrose 50% Injectable 25 Gram(s) IV Push once  ezetimibe 10 milliGRAM(s) Oral daily  famotidine    Tablet 20 milliGRAM(s) Oral daily  fentaNYL   Infusion. 0.508 MICROgram(s)/kG/Hr (4.23 mL/Hr) IV Continuous <Continuous>  glucagon  Injectable 1 milliGRAM(s) IntraMuscular once  insulin glargine Injectable (LANTUS) 34 Unit(s) SubCutaneous every morning  insulin lispro (ADMELOG) corrective regimen sliding scale   SubCutaneous every 6 hours  midodrine 10 milliGRAM(s) Oral every 8 hours  mupirocin 2% Nasal 1 Application(s) Both Nostrils every 12 hours  norepinephrine Infusion 0.05 MICROgram(s)/kG/Min (7.92 mL/Hr) IV Continuous <Continuous>  propofol Infusion 5 MICROgram(s)/kG/Min (2.54 mL/Hr) IV Continuous <Continuous>  QUEtiapine 25 milliGRAM(s) Oral at bedtime    MEDICATIONS  (PRN):  acetaminophen     Tablet .. 650 milliGRAM(s) Oral every 6 hours PRN Temp greater or equal to 38C (100.4F), Mild Pain (1 - 3)  albuterol/ipratropium for Nebulization 3 milliLiter(s) Nebulizer every 6 hours PRN Shortness of Breath and/or Wheezing  aluminum hydroxide/magnesium hydroxide/simethicone Suspension 30 milliLiter(s) Oral every 4 hours PRN Dyspepsia  dextrose Oral Gel 15 Gram(s) Oral once PRN Blood Glucose LESS THAN 70 milliGRAM(s)/deciliter  guaiFENesin  milliGRAM(s) Oral every 12 hours PRN for cough  melatonin 5 milliGRAM(s) Oral at bedtime PRN Insomnia  ondansetron Injectable 4 milliGRAM(s) IV Push every 8 hours PRN Nausea and/or Vomiting      PRESENT SYMPTOMS: [X ]Unable to obtain due to poor mentation   Source if other than patient:  [ ]Family   [ ]Team   [ X]All review of systems negative including pain and dyspnea unless noted below    All components of pain assessment below addressed. Blank spaces indicate that the patient did/could not complete the assessment.  Pain: [ ]yes [ ]no  QOL impact -   Location -                    Aggravating factors -  Quality -  Radiation -  Timing-  Severity (0-10 scale):  Minimal acceptable level (0-10 scale):     CPOT:    https://www.[facility].org/getattachment/[email or web address]-1690w2493t5e/Critical-Care-Pain-Observation-Tool-(CPOT)    PAIN AD Score: 0  http://geriatrictoolkit.[place].[facility]/cog/painad.pdf (press ctrl +  left click to view)    Dyspnea:                           [ ]None[ ]Mild [ ]Moderate [ ]Severe     Respiratory Distress Observation Scale (RDOS): 1  A score of 0 to 2 signifies little or no respiratory distress, 3 signifies mild distress, scores 4 to 6 indicate moderate distress, and scores greater than 7 signify severe distress  https://www.[facility].ca/sites/default/files/PDFS/043389-kgajkdggesq-yyfupamy-mkcpchabvjq-ekiyu.pdf    Anxiety:                             [ ]None[ ]Mild [ ]Moderate [ ]Severe   Fatigue:                             [ ]None[ ]Mild [ ]Moderate [ ]Severe   Nausea:                             [ ]None[ ]Mild [ ]Moderate [ ]Severe   Loss of appetite:              [ ]None[ ]Mild [ ]Moderate [ ]Severe   Constipation:                    [ ]None[ ]Mild [ ]Moderate [ ]Severe    Other Symptoms:    PHYSICAL EXAM:  Vital Signs Last 24 Hrs  T(C): 38 (16 Jun 2025 08:34), Max: 38.7 (16 Jun 2025 02:54)  T(F): 100.4 (16 Jun 2025 08:34), Max: 101.7 (16 Jun 2025 02:54)  HR: 79 (16 Jun 2025 08:34) (62 - 100)  BP: 111/55 (16 Jun 2025 08:34) (64/37 - 157/70)  BP(mean): 79 (16 Jun 2025 08:34) (46 - 101)  RR: 21 (16 Jun 2025 08:34) (20 - 40)  SpO2: 100% (16 Jun 2025 08:34) (85% - 100%)    Parameters below as of 16 Jun 2025 08:34  Patient On (Oxygen Delivery Method): ventilator    O2 Concentration (%): 40 I&O's Summary    15 Kleber 2025 07:01  -  16 Jun 2025 07:00  --------------------------------------------------------  IN: 2687 mL / OUT: 1475 mL / NET: 1212 mL    16 Jun 2025 07:01  -  16 Jun 2025 11:29  --------------------------------------------------------  IN: 0 mL / OUT: 395 mL / NET: -395 mL        GENERAL:  [X ] No acute distress [ ]Lethargic  [ ]Unarousable  [ ]Verbal  [ ]Non-Verbal [ ]Cachexia    BEHAVIORAL/PSYCH:  [ ]Alert and Oriented x  [ ] Anxiety [ ] Delirium [ ] Agitation [X ] Calm   EYES: [ ] No scleral icterus [ ] Scleral icterus [ ] Closed  ENMT:  [ ]Dry mouth  [ ]No external oral lesions [ X] No external ear or nose lesions  CARDIOVASCULAR:  [ ]Regular [ ]Irregular [ ]Tachy [ ]Not Tachy  [ ]Aubrey [ ] Edema [ ] No edema  PULMONARY:  [ ]Tachypnea  [ ]Audible excessive secretions [X ] No labored breathing [ ] labored breathing  GASTROINTESTINAL: [ ]Soft  [ ]Distended  [ X]Not distended [ ]Non tender [ ]Tender  MUSCULOSKELETAL: [ ]No clubbing [ ] clubbing  [ X] No cyanosis [ ] cyanosis  NEUROLOGIC: [ ]No focal deficits  [ ]Follows commands  [ ]Does not follow commands  [ ]Cognitive impairment  [ ]Dysphagia  [ ]Dysarthria  [ ]Paresis   SKIN: [ ] Jaundiced [X ] Non-jaundiced [ ]Rash [ ]No Rash [ ] Warm [ ] Dry  MISC/LINES: [ ] ET tube [ ] Trach [ ]NGT/OGT [ ]PEG [ ]Mcmahon    CRITICAL CARE:  [ ] Shock Present  [ ]Septic [ ]Cardiogenic [ ]Neurologic [ ]Hypovolemic  [ ]  Vasopressors [ ]  Inotropes   [ ]Respiratory failure present [ ]Mechanical ventilation [ ]Non-invasive ventilatory support [ ]High flow  [ ]Acute  [ ]Chronic [ ]Hypoxic  [ ]Hypercarbic [ ]Other  [ ]Other organ failure     LABS: reviewed by me, notable for: elevated WBC, SCr; UA WNL                        13.4   33.02 )-----------( 335      ( 16 Jun 2025 06:28 )             44.5   06-16    140  |  102  |  56[H]  ----------------------------<  136[H]  4.7   |  23  |  2.3[H]    Ca    9.4      16 Jun 2025 06:28  Phos  4.6     06-15  Mg     2.8     06-16    TPro  6.5  /  Alb  3.5  /  TBili  0.4  /  DBili  x   /  AST  18  /  ALT  11  /  AlkPhos  100  06-16  PT/INR - ( 15 Kleber 2025 06:45 )   PT: 21.30 sec;   INR: 1.78 ratio             Urinalysis Basic - ( 16 Jun 2025 06:28 )    Color: x / Appearance: x / SG: x / pH: x  Gluc: 136 mg/dL / Ketone: x  / Bili: x / Urobili: x   Blood: x / Protein: x / Nitrite: x   Leuk Esterase: x / RBC: x / WBC x   Sq Epi: x / Non Sq Epi: x / Bacteria: x      RADIOLOGY & ADDITIONAL STUDIES: CXR personally reviewed by me: 6/16: possibly b/l opacities    PROTEIN CALORIE MALNUTRITION PRESENT: [ ]mild [ ]moderate [ ]severe [ ]underweight [ ]morbid obesity  https://www.andeal.org/vault/2440/web/files/ONC/Table_Clinical%20Characteristics%20to%20Document%20Malnutrition-White%20JV%20et%20al%202012.pdf    Height (cm): 160 (06-14-25 @ 08:00), 162.6 (06-03-25 @ 14:33), 160 (05-30-25 @ 11:11)  Weight (kg): 83.3 (06-14-25 @ 08:00), 75.9 (06-03-25 @ 14:33), 75.7 (05-30-25 @ 11:11)  BMI (kg/m2): 32.5 (06-14-25 @ 08:00), 28.7 (06-03-25 @ 14:33), 29.6 (05-30-25 @ 11:11)    [ ]PPSV2 < or = to 30% [ ]significant weight loss  [ ]poor nutritional intake  [ ]anasarca      [ ]Artificial Nutrition          Palliative Care Spiritual/Emotional Screening Tool Question  Severity (0-4):                    OR                    [X ] Unable to determine/NA  Score of 2 or greater indicates recommendation of Chaplaincy referral  Chaplaincy Referral: [ ] Yes [ ] Refused [ ] Following     Caregiver [place]:  [ ] Yes [ ] No    OR    [x ] Unable to determine. Will assess at later time if appropriate.  Social Work Referral [ ]  Patient and Family Centered Care Referral [ ]    Anticipatory Grief Present: [ ] Yes [ ] No    OR     [ x] Unable to determine. Will assess at later time if appropriate.  Social Work Referral [ ]  Patient and Family Centered Care Referral [ ]    REFERRALS:   [ ]Chaplaincy  [ ]Hospice  [ ]Child Life  [ ]Social Work  [ ]Case management [ ]Holistic Therapy     Palliative care education provided to patient and/or family    Goals of Care Document:     ______________  Wolfgang Ramos MD  Palliative Medicine  [facility]   of Geriatric and Palliative Medicine  [phone]

## 2025-06-16 NOTE — DIETITIAN INITIAL EVALUATION ADULT - ENTERAL
Renal formula not warranted at this time, due to elevated BG levels recommend to change EN regimen to Peptamen AF at 30 ml/hr increase as tolerated to goal rate of 73 ml/hr x 18 hrs  with H20 flushes of 100 ml pre and post feed will provide pt with 1650 kcals, 105g protein, 1117+200 ml free water meeting >80% of estimated needs

## 2025-06-16 NOTE — PROGRESS NOTE ADULT - NS ATTEND AMEND GEN_ALL_CORE FT
Agree with plan above.    The patient is a known patient who is non compliant with medications who presents with frequent readmissions.  Currently intubated.   Plan as noted above.  Will need to re initiate GDMT and maintain diuresis.

## 2025-06-16 NOTE — DIETITIAN INITIAL EVALUATION ADULT - ORAL INTAKE PTA/DIET HISTORY
unable to assess pt intubated to vent, no family at bedside. As per EMR review pt weighed 77.1 kgs in May vs 83.3 kgs today.       EN via OGT of Nepro at 300 ml q 6 hrs tolerating thus far as per RN, provides 2100 kcals, 95g protein and 860 ml water.

## 2025-06-16 NOTE — DIETITIAN INITIAL EVALUATION ADULT - OTHER INFO
pt is 70 year old male with hx of COPD, DM, HFrEF (30 % EF), CAD, ischemic cardiomyopathy s/p CRT-D, HTN, p afib p/w LESLIE and B/L LE swelling, non-compliance with meds. pt admitted with CHF exacerbation and chronic leukocytosis. s/p  RR 6/14 intubation warranted upgraded to CCU. Bumex held 2/2 worsening kidney fxn.

## 2025-06-17 NOTE — PROGRESS NOTE ADULT - SUBJECTIVE AND OBJECTIVE BOX
DATE OF SERVICE: 06-17-25    Patient denies chest pain or shortness of breath.   Review of symptoms otherwise negative.    acetaminophen     Tablet .. 650 milliGRAM(s) Oral every 6 hours PRN  albuterol/ipratropium for Nebulization 3 milliLiter(s) Nebulizer every 6 hours PRN  aluminum hydroxide/magnesium hydroxide/simethicone Suspension 30 milliLiter(s) Oral every 4 hours PRN  aMIOdarone    Tablet 200 milliGRAM(s) Oral daily  atorvastatin 40 milliGRAM(s) Oral at bedtime  cefepime   IVPB 1000 milliGRAM(s) IV Intermittent every 12 hours  chlorhexidine 0.12% Liquid 15 milliLiter(s) Oral Mucosa every 12 hours  chlorhexidine 2% Cloths 1 Application(s) Topical <User Schedule>  dexMEDEtomidine Infusion 0.05 MICROgram(s)/kG/Hr IV Continuous <Continuous>  dextrose 5%. 1000 milliLiter(s) IV Continuous <Continuous>  dextrose 5%. 1000 milliLiter(s) IV Continuous <Continuous>  dextrose 50% Injectable 25 Gram(s) IV Push once  dextrose 50% Injectable 12.5 Gram(s) IV Push once  dextrose 50% Injectable 25 Gram(s) IV Push once  dextrose Oral Gel 15 Gram(s) Oral once PRN  ezetimibe 10 milliGRAM(s) Oral daily  fentaNYL   Infusion. 0.508 MICROgram(s)/kG/Hr IV Continuous <Continuous>  glucagon  Injectable 1 milliGRAM(s) IntraMuscular once  guaiFENesin Oral Liquid (Sugar-Free) 200 milliGRAM(s) Oral every 6 hours PRN  insulin glargine Injectable (LANTUS) 34 Unit(s) SubCutaneous every morning  insulin lispro (ADMELOG) corrective regimen sliding scale   SubCutaneous every 6 hours  melatonin 5 milliGRAM(s) Oral at bedtime PRN  midodrine 10 milliGRAM(s) Oral every 8 hours  mupirocin 2% Nasal 1 Application(s) Both Nostrils every 12 hours  norepinephrine Infusion 0.05 MICROgram(s)/kG/Min IV Continuous <Continuous>  ondansetron Injectable 4 milliGRAM(s) IV Push every 8 hours PRN  pantoprazole  Injectable 40 milliGRAM(s) IV Push two times a day  propofol Infusion 5 MICROgram(s)/kG/Min IV Continuous <Continuous>  QUEtiapine 25 milliGRAM(s) Oral at bedtime                            11.5   22.35 )-----------( 233      ( 17 Jun 2025 06:06 )             39.1       Hemoglobin: 11.5 g/dL (06-17 @ 06:06)  Hemoglobin: 13.4 g/dL (06-16 @ 06:28)  Hemoglobin: 13.2 g/dL (06-15 @ 06:45)  Hemoglobin: 12.5 g/dL (06-14 @ 19:24)  Hemoglobin: 12.8 g/dL (06-14 @ 12:00)      06-17    141  |  107  |  59[H]  ----------------------------<  167[H]  4.9   |  24  |  1.9[H]    Ca    8.3[L]      17 Jun 2025 06:06  Mg     2.6     06-17    TPro  4.9[L]  /  Alb  2.6[L]  /  TBili  0.2  /  DBili  x   /  AST  12  /  ALT  7   /  AlkPhos  62  06-17    Creatinine Trend: 1.9<--, 2.3<--, 2.1<--, 2.0<--, 2.2<--, 1.8<--    COAGS:           T(C): 37.8 (06-17-25 @ 11:01), Max: 39.4 (06-16-25 @ 23:01)  HR: 98 (06-17-25 @ 11:00) (64 - 778)  BP: 86/50 (06-17-25 @ 11:00) (80/48 - 130/58)  RR: 26 (06-17-25 @ 11:01) (20 - 26)  SpO2: 99% (06-17-25 @ 11:00) (92% - 100%)  Wt(kg): --    I&O's Summary    16 Jun 2025 07:01  -  17 Jun 2025 07:00  --------------------------------------------------------  IN: 2985.3 mL / OUT: 1850 mL / NET: 1135.3 mL    17 Jun 2025 07:01  -  17 Jun 2025 13:30  --------------------------------------------------------  IN: 0 mL / OUT: 485 mL / NET: -485 mL    PHYSICAL EXAM:  GENERAL:  69y/o Male NAD/ intubated/ sedated  HEAD:  Atraumatic, Normocephalic  EYES: EOMI, PERRLA, conjunctiva and sclera clear  NECK: Supple, No JVD, no cervical lymphadenopathy, non-tender  CHEST/LUNG: Bilaterally BS  HEART: Regular rate and rhythm; S1&S2  ABDOMEN: Soft, Nontender, Nondistended x 4 quadrants; Bowel sounds present  EXTREMITIES:  no EDEMA NOTED on b/l LE   NEUROLOGY: WNL  SKIN: Cool extremity on pressors      EKG/ TELEM:  < from: 12 Lead ECG (06.14.25 @ 04:54) >   Atrial-sensed ventricular-paced rhythm  Biventricular pacemaker detected  Abnormal ECG    < end of copied text >    < from: Xray Chest 1 View- PORTABLE-Routine (Xray Chest 1 View- PORTABLE-Routine in AM.) (06.17.25 @ 07:21) >  IMPRESSION:    Mild improvement in right basilar opacity. Stable left lung opacities.    --- End of Report ---            OTIS KATE MD; Attending Radiologist  This document has been electronically signed. Jun 17 2025 12:49PM    < end of copied text >    Assessment and Recommendations:   70 year old male with past medical history of  COPD (on 2L home O2), DM 2  HFrEF 30%, CAD,  ischemic cardiomyopathy s/p CRT-D, HTN, paroxysmal A-fib (status post ablation 1/14/25) on amiodarone and  Eliquis who presented to ED w c/o LESLIE and b/l  leg swelling.         Impression:  #SOB/LESLIE: Multifactorial  #HFrEF (EF 30%, s/p CRT-D)  #CAD s/p PCI to RCA and LAD  #NSTEMI/ Hx of frequent NSVT   #Paroxysmal AFib on Eliquis      Plan:  - Currently intubated, mechanically ventilated, and sedated with fentanyl/ precedex  - 1L +fluid balance- Restart Bumex 1mg IVP BID  - On Levophed-wean off pressors as able  - Check BMP daily and monitor renal function. Maintain K >4.0, Mg >2.2    - Strict intake and output, maintain negative balance for now  - For Afib - c/w AC, BB and amiodarone   - Restart home GDMT for HFrEF, when stable/medically appropriate   - Pt needs ischemic eval, but has refused in the past

## 2025-06-17 NOTE — PROGRESS NOTE ADULT - ASSESSMENT
70M with PMH of COPD, DM2, HFrEF, CAD, ICM s/p CRT-D, HTN, pAF< eliquis here with LESLIE and bilateral leg swelling, and found to have acute HFrEF and pulmonary edema, on vent, nebs, steroids, and bumex. Will need ischemic eval eventually. Patient was DNR/DNI/trial NIV on recent admission after d/w palliative, but wife opted this admission for intubation. Palliative c/s for GOC.    - see GOC above  - c/w fentanyl, precedex gtt  - c/w pressors  - no acute symptoms  - will follow    ______________  Wolfgang Ramos MD  Palliative Medicine  [facility]  [phone]

## 2025-06-17 NOTE — PROGRESS NOTE ADULT - SUBJECTIVE AND OBJECTIVE BOX
MONIQUE LYONS 70y Male  MRN#: [id number]   Hospital Day: 3d    HPI:   Patient is a  70 year old male with past medical history of  COPD (on 2L home O2), DM 2  HFrEF 30%, CAD,  ischemic cardiomyopathy s/p CRT-D, HTN, paroxysmal A-fib (status post ablation 1/14/25) on amiodarone and  Eliquis who presented to ED w c/o LESLIE and b/l  leg swelling.  Patient said it started 2 days ago and felt like his previous heart failure exacerbations. Patient denied n/v/f/c; denied HA lightheadedness; denied palpitations cough CP (now resolved on admission).  (14 Jun 2025 02:21)      SUBJECTIVE  Patient is a 70y old Male who presents with a chief complaint of SOB (17 Jun 2025 13:21)  Currently admitted to medicine with the primary diagnosis of CHF exacerbation      INTERVAL HPI AND OVERNIGHT EVENTS:  Patient was examined and seen at bedside.      OBJECTIVE  PAST MEDICAL & SURGICAL HISTORY  CHF (congestive heart failure)    Diabetes    CAD (coronary artery disease)    Chronic obstructive pulmonary disease (COPD)    HTN (hypertension)    Stented coronary artery    Dyslipidemia    GERD (gastroesophageal reflux disease)    Afib    CVA (cerebral vascular accident)    H/O heart artery stent    Heart valve replaced  aortic    History of Nissen fundoplication      ALLERGIES:  Bactrim (Unknown)  sulfa drugs (Unknown)    MEDICATIONS:  STANDING MEDICATIONS  aMIOdarone    Tablet 200 milliGRAM(s) Oral daily  atorvastatin 40 milliGRAM(s) Oral at bedtime  cefepime   IVPB 1000 milliGRAM(s) IV Intermittent every 12 hours  chlorhexidine 0.12% Liquid 15 milliLiter(s) Oral Mucosa every 12 hours  chlorhexidine 2% Cloths 1 Application(s) Topical <User Schedule>  dexMEDEtomidine Infusion 0.05 MICROgram(s)/kG/Hr IV Continuous <Continuous>  dextrose 5%. 1000 milliLiter(s) IV Continuous <Continuous>  dextrose 5%. 1000 milliLiter(s) IV Continuous <Continuous>  dextrose 50% Injectable 25 Gram(s) IV Push once  dextrose 50% Injectable 12.5 Gram(s) IV Push once  dextrose 50% Injectable 25 Gram(s) IV Push once  ezetimibe 10 milliGRAM(s) Oral daily  fentaNYL   Infusion. 0.508 MICROgram(s)/kG/Hr IV Continuous <Continuous>  glucagon  Injectable 1 milliGRAM(s) IntraMuscular once  insulin glargine Injectable (LANTUS) 34 Unit(s) SubCutaneous every morning  insulin lispro (ADMELOG) corrective regimen sliding scale   SubCutaneous every 6 hours  midodrine 10 milliGRAM(s) Oral every 8 hours  mupirocin 2% Nasal 1 Application(s) Both Nostrils every 12 hours  norepinephrine Infusion 0.05 MICROgram(s)/kG/Min IV Continuous <Continuous>  pantoprazole  Injectable 40 milliGRAM(s) IV Push two times a day  propofol Infusion 5 MICROgram(s)/kG/Min IV Continuous <Continuous>  QUEtiapine 25 milliGRAM(s) Oral at bedtime    PRN MEDICATIONS  acetaminophen     Tablet .. 650 milliGRAM(s) Oral every 6 hours PRN  albuterol/ipratropium for Nebulization 3 milliLiter(s) Nebulizer every 6 hours PRN  aluminum hydroxide/magnesium hydroxide/simethicone Suspension 30 milliLiter(s) Oral every 4 hours PRN  dextrose Oral Gel 15 Gram(s) Oral once PRN  guaiFENesin Oral Liquid (Sugar-Free) 200 milliGRAM(s) Oral every 6 hours PRN  melatonin 5 milliGRAM(s) Oral at bedtime PRN  ondansetron Injectable 4 milliGRAM(s) IV Push every 8 hours PRN      VITAL SIGNS: Last 24 Hours  T(C): 37.8 (17 Jun 2025 11:01), Max: 39.4 (16 Jun 2025 23:01)  T(F): 100 (17 Jun 2025 11:01), Max: 102.9 (16 Jun 2025 23:01)  HR: 98 (17 Jun 2025 11:00) (64 - 778)  BP: 86/50 (17 Jun 2025 11:00) (80/48 - 130/58)  BP(mean): 63 (17 Jun 2025 11:00) (60 - 86)  RR: 26 (17 Jun 2025 11:01) (20 - 26)  SpO2: 99% (17 Jun 2025 11:00) (92% - 100%)    LABS:                        11.5   22.35 )-----------( 233      ( 17 Jun 2025 06:06 )             39.1     06-17    141  |  107  |  59[H]  ----------------------------<  167[H]  4.9   |  24  |  1.9[H]    Ca    8.3[L]      17 Jun 2025 06:06  Mg     2.6     06-17    TPro  4.9[L]  /  Alb  2.6[L]  /  TBili  0.2  /  DBili  x   /  AST  12  /  ALT  7   /  AlkPhos  62  06-17      Urinalysis Basic - ( 17 Jun 2025 06:06 )    Color: x / Appearance: x / SG: x / pH: x  Gluc: 167 mg/dL / Ketone: x  / Bili: x / Urobili: x   Blood: x / Protein: x / Nitrite: x   Leuk Esterase: x / RBC: x / WBC x   Sq Epi: x / Non Sq Epi: x / Bacteria: x      ABG - ( 17 Jun 2025 05:48 )  pH, Arterial: 7.32  pH, Blood: x     /  pCO2: 50    /  pO2: 115   / HCO3: 26    / Base Excess: -0.9  /  SaO2: 99.1      Culture - Sputum (collected 15 Kleber 2025 16:44)  Source: Sputum Sputum  Gram Stain (16 Jun 2025 01:30):    Rare polymorphonuclear leukocytes per low power field    No Squamous epithelial cells per low power field    Rare Yeast like cells seen per oil power field    Rare Gram Positive Cocci in Clusters seen per oil power field  Final Report (17 Jun 2025 07:52):    Commensal magda consistent with body site    Culture - Blood (collected 14 Jun 2025 15:36)  Source: Blood None  Preliminary Report (17 Jun 2025 01:02):    No growth at 48 Hours          ASSESSMENT & PLAN  70 year old male with past medical history of  COPD (on 2L home O2), DM 2  HFrEF 30%, CAD,  ischemic cardiomyopathy s/p CRT-D, HTN, paroxysmal A-fib (status post ablation 1/14/25) on amiodarone and  Eliquis who presented to ED w c/o LESLIE and b/l  leg swelling.     Impression:  #SOB/LESLIE: Multifactorial  #HFrEF (EF 30%, s/p CRT-D)  #CAD s/p PCI to RCA and LAD  #NSTEMI/ Hx of frequent NSVT   #Paroxysmal AFib on Eliquis  #suspected GI bleed   #Diarrhea and Fever      Plan:  - Currently intubated, mechanically ventilated, and sedated  - Resume Bumex 1mg IV BID  - Echo 6/16: EF 31% stable compared to old   - Check BMP daily and monitor renal function. Maintain K >4.0, Mg >2.2    - Strict intake and output, maintain negative balance for now  - pBNP 2894. Repeat closer to discharge  - Restart home GDMT for HFrEF, when stable/medically appropriate   - For Afib - c/w AC, BB and amiodarone   - Pt needs ischemic eval, but has refused in the past   - HOLDING Eliquis, f/u CBC in pm, f/u GI consult   - f/u ID for abx recs

## 2025-06-17 NOTE — PROGRESS NOTE ADULT - NS ATTEND AMEND GEN_ALL_CORE FT
70 year old male with past medical history of  COPD (on 2L home O2), DM 2  HFrEF 30%, CAD,  ischemic cardiomyopathy s/p CRT-D, HTN, paroxysmal A-fib (status post ablation 1/14/25) on amiodarone and  Eliquis who presented to ED w c/o LESLIE and b/l  leg swelling.       Acute decompensated S-CHF:  Non compliant with multiple admissions. Currently still net positive on exam. Restart Bumex 1 mg IV bid. Continue to monitor Cr. K, and Mg. Continue current plan as noted above.

## 2025-06-17 NOTE — CONSULT NOTE ADULT - SUBJECTIVE AND OBJECTIVE BOX
Chief complaint/Reason for consult: diarrhea, fecal occult blood positive    HPI:   Patient is a  70 year old male with past medical history of  COPD (on 2L home O2), DM 2  HFrEF 30%, CAD,  ischemic cardiomyopathy s/p CRT-D, HTN, paroxysmal A-fib (status post ablation 1/14/25) on amiodarone and  Eliquis who presented to ED w c/o LESLIE and b/l  leg swelling.  Patient said it started 2 days ago and felt like his previous heart failure exacerbations. Patient denied n/v/f/c; denied HA lightheadedness; denied palpitations cough CP (now resolved on admission).  (14 Jun 2025 02:21)    GI updates: 70yMale with past medical history of  COPD (on 2L home O2), DM 2  HFrEF 30%, CAD,  ischemic cardiomyopathy s/p CRT-D, HTN, paroxysmal A-fib (status post ablation 1/14/25) on amiodarone and  Eliquis who presented  for b/l leg swelling patient admitted with acute respiratory failure, sepsis, possible aspiration pneumonia and new diarrhea. Patient with digni-shield in.  No hematemesis, melena, blood in stool reported, patient had positive fecal occult blood test.     PAST MEDICAL & SURGICAL HISTORY:   CHF (congestive heart failure)  Diabetes  CAD (coronary artery disease)  Chronic obstructive pulmonary disease (COPD)  HTN (hypertension)  Stented coronary artery  Dyslipidemia  GERD (gastroesophageal reflux disease)  Afib  CVA (cerebral vascular accident)  H/O heart artery stent  Heart valve replaced  aortic  History of Nissen fundoplication            Family history:  FAMILY HISTORY:    No GI cancers in first or second degree relatives    Social History: No smoking. No alcohol. No illegal drug use.    Allergies:   Bactrim (Unknown)  sulfa drugs (Unknown)  Intolerances      MEDICATIONS: Home Medications:  atorvastatin 40 mg oral tablet: 1 tab(s) orally once a day (25 May 2025 03:34)  clopidogrel 75 mg oral tablet: 1 tab(s) orally once a day (25 May 2025 03:34)  ezetimibe 10 mg oral tablet: 1 orally once a day (25 May 2025 03:37)  famotidine 40 mg oral tablet: 1 tab(s) orally once a day (25 May 2025 03:37)  fenofibrate 145 mg oral tablet: 1 orally once a day (25 May 2025 03:37)  melatonin 5 mg oral tablet: 1 tab(s) orally once a day (at bedtime) (25 May 2025 03:37)  metFORMIN 500 mg oral tablet: 1 tab(s) orally 2 times a day (25 May 2025 03:37)  metoprolol succinate 50 mg oral tablet, extended release: 1 tab(s) orally once a day (25 May 2025 03:37)  Soaanz 20 mg oral tablet: 1 tab(s) orally once a day (06 Jun 2025 15:53)  spironolactone 25 mg oral tablet: 1 tab(s) orally every 24 hours (25 May 2025 04:25)  Tresiba 100 units/mL subcutaneous solution: 34 unit(s) subcutaneous once a day (in the morning) (25 May 2025 04:25)    MEDICATIONS  (STANDING):  aMIOdarone    Tablet 200 milliGRAM(s) Oral daily  atorvastatin 40 milliGRAM(s) Oral at bedtime  cefepime   IVPB 1000 milliGRAM(s) IV Intermittent every 12 hours  chlorhexidine 0.12% Liquid 15 milliLiter(s) Oral Mucosa every 12 hours  chlorhexidine 2% Cloths 1 Application(s) Topical <User Schedule>  dexMEDEtomidine Infusion 0.05 MICROgram(s)/kG/Hr (1.04 mL/Hr) IV Continuous <Continuous>  dextrose 5%. 1000 milliLiter(s) (100 mL/Hr) IV Continuous <Continuous>  dextrose 5%. 1000 milliLiter(s) (50 mL/Hr) IV Continuous <Continuous>  dextrose 50% Injectable 25 Gram(s) IV Push once  dextrose 50% Injectable 12.5 Gram(s) IV Push once  dextrose 50% Injectable 25 Gram(s) IV Push once  ezetimibe 10 milliGRAM(s) Oral daily  fentaNYL   Infusion. 0.508 MICROgram(s)/kG/Hr (4.23 mL/Hr) IV Continuous <Continuous>  glucagon  Injectable 1 milliGRAM(s) IntraMuscular once  insulin glargine Injectable (LANTUS) 34 Unit(s) SubCutaneous every morning  insulin lispro (ADMELOG) corrective regimen sliding scale   SubCutaneous every 6 hours  midodrine 10 milliGRAM(s) Oral every 8 hours  mupirocin 2% Nasal 1 Application(s) Both Nostrils every 12 hours  norepinephrine Infusion 0.05 MICROgram(s)/kG/Min (7.92 mL/Hr) IV Continuous <Continuous>  pantoprazole  Injectable 40 milliGRAM(s) IV Push two times a day  propofol Infusion 5 MICROgram(s)/kG/Min (2.54 mL/Hr) IV Continuous <Continuous>  QUEtiapine 25 milliGRAM(s) Oral at bedtime    MEDICATIONS  (PRN):  acetaminophen     Tablet .. 650 milliGRAM(s) Oral every 6 hours PRN Temp greater or equal to 38C (100.4F), Mild Pain (1 - 3)  albuterol/ipratropium for Nebulization 3 milliLiter(s) Nebulizer every 6 hours PRN Shortness of Breath and/or Wheezing  aluminum hydroxide/magnesium hydroxide/simethicone Suspension 30 milliLiter(s) Oral every 4 hours PRN Dyspepsia  dextrose Oral Gel 15 Gram(s) Oral once PRN Blood Glucose LESS THAN 70 milliGRAM(s)/deciliter  guaiFENesin Oral Liquid (Sugar-Free) 200 milliGRAM(s) Oral every 6 hours PRN Cough  melatonin 5 milliGRAM(s) Oral at bedtime PRN Insomnia  ondansetron Injectable 4 milliGRAM(s) IV Push every 8 hours PRN Nausea and/or Vomiting        REVIEW OF SYSTEMS  unobtainable     VITALS:   T(F): 100 (06-17-25 @ 11:01), Max: 102.9 (06-16-25 @ 23:01)  HR: 88 (06-17-25 @ 14:24) (64 - 778)  BP: 86/50 (06-17-25 @ 11:00) (80/48 - 130/58)  RR: 26 (06-17-25 @ 11:01) (20 - 26)  SpO2: 100% (06-17-25 @ 14:24) (92% - 100%)    PHYSICAL EXAM:  GENERAL: +intubated  HEAD:  Atraumatic, Normocephalic  EYES: conjunctiva and sclera clear  NECK: Supple, No thyromegaly   CHEST/LUNG: b/l rhonchi  HEART: Regular rate and rhythm; normal S1, S2, No murmurs.  ABDOMEN: Soft, nontender, nondistended; Bowel sounds present  NEUROLOGY: No asterixis or tremor  SKIN: Intact, no jaundice          LABS:  06-17    141  |  107  |  59[H]  ----------------------------<  167[H]  4.9   |  24  |  1.9[H]    Ca    8.3[L]      17 Jun 2025 06:06  Mg     2.6     06-17    TPro  4.9[L]  /  Alb  2.6[L]  /  TBili  0.2  /  DBili  x   /  AST  12  /  ALT  7   /  AlkPhos  62  06-17                          11.5   22.35 )-----------( 233      ( 17 Jun 2025 06:06 )             39.1     LIVER FUNCTIONS - ( 17 Jun 2025 06:06 )  Alb: 2.6 g/dL / Pro: 4.9 g/dL / ALK PHOS: 62 U/L / ALT: 7 U/L / AST: 12 U/L / GGT: x               IMAGING:    < from: Xray Chest 1 View- PORTABLE-Routine (Xray Chest 1 View- PORTABLE-Routine in AM.) (06.17.25 @ 07:21) >    ACC: [id number] EXAM:  XR CHEST PORTABLE  ROUTINE 1V   ORDERED BY: FATUMA JONES     PROCEDURE DATE:  06/17/2025          INTERPRETATION:  CLINICAL HISTORY: Follow-up.    COMPARISON: Chest radiograph 6/16/2025.    TECHNIQUE: Frontal chest radiograph.    FINDINGS:    Support devices: ET tube terminates above the oscar. Enteric tube   courses below the hemidiaphragms. Left chest AICD.    Cardiac/mediastinum/hilum: Unchanged.    Lung parenchyma/Pleura: Mild improvement in right basilar opacity. Stable   left lung opacities. No definite pneumothorax. Please note the bilateral   lower lungs are not completely imaged.    Skeleton/soft tissues: Unchanged      IMPRESSION:    Mild improvement in right basilar opacity. Stable left lung opacities.    --- End of Report ---            OTIS KATE MD; Attending Radiologist  This document has been electronically signed. Jun 17 2025 12:49PM    < end of copied text >       Chief complaint/Reason for consult: diarrhea, fecal occult blood positive    HPI:   Patient is a  70 year old male with past medical history of  COPD (on 2L home O2), DM 2  HFrEF 30%, CAD,  ischemic cardiomyopathy s/p CRT-D, HTN, paroxysmal A-fib (status post ablation 1/14/25) on amiodarone and  Eliquis who presented to ED w c/o LESLIE and b/l  leg swelling.  Patient said it started 2 days ago and felt like his previous heart failure exacerbations. Patient denied n/v/f/c; denied HA lightheadedness; denied palpitations cough CP (now resolved on admission).  (14 Jun 2025 02:21)    GI updates: 70y Male with past medical history of  COPD (on 2L home O2), DM 2  HFrEF 30%, CAD,  ischemic cardiomyopathy s/p CRT-D, HTN, paroxysmal A-fib (status post ablation 1/14/25) on amiodarone and  Eliquis who presented  for b/l leg swelling patient admitted with acute respiratory failure, sepsis, possible aspiration pneumonia and new diarrhea. Patient with digni-shield in.  No hematemesis, melena, blood in stool reported, patient had positive fecal occult blood test.     PAST MEDICAL & SURGICAL HISTORY:   CHF (congestive heart failure)  Diabetes  CAD (coronary artery disease)  Chronic obstructive pulmonary disease (COPD)  HTN (hypertension)  Stented coronary artery  Dyslipidemia  GERD (gastroesophageal reflux disease)  Afib  CVA (cerebral vascular accident)  H/O heart artery stent  Heart valve replaced  aortic  History of Nissen fundoplication            Family history:  FAMILY HISTORY:    No GI cancers in first or second degree relatives    Social History: No smoking. No alcohol. No illegal drug use.    Allergies:   Bactrim (Unknown)  sulfa drugs (Unknown)  Intolerances      MEDICATIONS: Home Medications:  atorvastatin 40 mg oral tablet: 1 tab(s) orally once a day (25 May 2025 03:34)  clopidogrel 75 mg oral tablet: 1 tab(s) orally once a day (25 May 2025 03:34)  ezetimibe 10 mg oral tablet: 1 orally once a day (25 May 2025 03:37)  famotidine 40 mg oral tablet: 1 tab(s) orally once a day (25 May 2025 03:37)  fenofibrate 145 mg oral tablet: 1 orally once a day (25 May 2025 03:37)  melatonin 5 mg oral tablet: 1 tab(s) orally once a day (at bedtime) (25 May 2025 03:37)  metFORMIN 500 mg oral tablet: 1 tab(s) orally 2 times a day (25 May 2025 03:37)  metoprolol succinate 50 mg oral tablet, extended release: 1 tab(s) orally once a day (25 May 2025 03:37)  Soaanz 20 mg oral tablet: 1 tab(s) orally once a day (06 Jun 2025 15:53)  spironolactone 25 mg oral tablet: 1 tab(s) orally every 24 hours (25 May 2025 04:25)  Tresiba 100 units/mL subcutaneous solution: 34 unit(s) subcutaneous once a day (in the morning) (25 May 2025 04:25)    MEDICATIONS  (STANDING):  aMIOdarone    Tablet 200 milliGRAM(s) Oral daily  atorvastatin 40 milliGRAM(s) Oral at bedtime  cefepime   IVPB 1000 milliGRAM(s) IV Intermittent every 12 hours  chlorhexidine 0.12% Liquid 15 milliLiter(s) Oral Mucosa every 12 hours  chlorhexidine 2% Cloths 1 Application(s) Topical <User Schedule>  dexMEDEtomidine Infusion 0.05 MICROgram(s)/kG/Hr (1.04 mL/Hr) IV Continuous <Continuous>  dextrose 5%. 1000 milliLiter(s) (100 mL/Hr) IV Continuous <Continuous>  dextrose 5%. 1000 milliLiter(s) (50 mL/Hr) IV Continuous <Continuous>  dextrose 50% Injectable 25 Gram(s) IV Push once  dextrose 50% Injectable 12.5 Gram(s) IV Push once  dextrose 50% Injectable 25 Gram(s) IV Push once  ezetimibe 10 milliGRAM(s) Oral daily  fentaNYL   Infusion. 0.508 MICROgram(s)/kG/Hr (4.23 mL/Hr) IV Continuous <Continuous>  glucagon  Injectable 1 milliGRAM(s) IntraMuscular once  insulin glargine Injectable (LANTUS) 34 Unit(s) SubCutaneous every morning  insulin lispro (ADMELOG) corrective regimen sliding scale   SubCutaneous every 6 hours  midodrine 10 milliGRAM(s) Oral every 8 hours  mupirocin 2% Nasal 1 Application(s) Both Nostrils every 12 hours  norepinephrine Infusion 0.05 MICROgram(s)/kG/Min (7.92 mL/Hr) IV Continuous <Continuous>  pantoprazole  Injectable 40 milliGRAM(s) IV Push two times a day  propofol Infusion 5 MICROgram(s)/kG/Min (2.54 mL/Hr) IV Continuous <Continuous>  QUEtiapine 25 milliGRAM(s) Oral at bedtime    MEDICATIONS  (PRN):  acetaminophen     Tablet .. 650 milliGRAM(s) Oral every 6 hours PRN Temp greater or equal to 38C (100.4F), Mild Pain (1 - 3)  albuterol/ipratropium for Nebulization 3 milliLiter(s) Nebulizer every 6 hours PRN Shortness of Breath and/or Wheezing  aluminum hydroxide/magnesium hydroxide/simethicone Suspension 30 milliLiter(s) Oral every 4 hours PRN Dyspepsia  dextrose Oral Gel 15 Gram(s) Oral once PRN Blood Glucose LESS THAN 70 milliGRAM(s)/deciliter  guaiFENesin Oral Liquid (Sugar-Free) 200 milliGRAM(s) Oral every 6 hours PRN Cough  melatonin 5 milliGRAM(s) Oral at bedtime PRN Insomnia  ondansetron Injectable 4 milliGRAM(s) IV Push every 8 hours PRN Nausea and/or Vomiting        REVIEW OF SYSTEMS  unobtainable     VITALS:   T(F): 100 (06-17-25 @ 11:01), Max: 102.9 (06-16-25 @ 23:01)  HR: 88 (06-17-25 @ 14:24) (64 - 778)  BP: 86/50 (06-17-25 @ 11:00) (80/48 - 130/58)  RR: 26 (06-17-25 @ 11:01) (20 - 26)  SpO2: 100% (06-17-25 @ 14:24) (92% - 100%)    PHYSICAL EXAM:  GENERAL: +intubated  HEAD:  Atraumatic, Normocephalic  EYES: conjunctiva and sclera clear  NECK: Supple, No thyromegaly   CHEST/LUNG: b/l rhonchi  HEART: Regular rate and rhythm; normal S1, S2, No murmurs.  ABDOMEN: Soft, nontender, nondistended; Bowel sounds present  NEUROLOGY: No asterixis or tremor  SKIN: Intact, no jaundice          LABS:  06-17    141  |  107  |  59[H]  ----------------------------<  167[H]  4.9   |  24  |  1.9[H]    Ca    8.3[L]      17 Jun 2025 06:06  Mg     2.6     06-17    TPro  4.9[L]  /  Alb  2.6[L]  /  TBili  0.2  /  DBili  x   /  AST  12  /  ALT  7   /  AlkPhos  62  06-17                          11.5   22.35 )-----------( 233      ( 17 Jun 2025 06:06 )             39.1     LIVER FUNCTIONS - ( 17 Jun 2025 06:06 )  Alb: 2.6 g/dL / Pro: 4.9 g/dL / ALK PHOS: 62 U/L / ALT: 7 U/L / AST: 12 U/L / GGT: x               IMAGING:    < from: Xray Chest 1 View- PORTABLE-Routine (Xray Chest 1 View- PORTABLE-Routine in AM.) (06.17.25 @ 07:21) >    ACC: [id number] EXAM:  XR CHEST PORTABLE  ROUTINE 1V   ORDERED BY: FATUMA JONES     PROCEDURE DATE:  06/17/2025          INTERPRETATION:  CLINICAL HISTORY: Follow-up.    COMPARISON: Chest radiograph 6/16/2025.    TECHNIQUE: Frontal chest radiograph.    FINDINGS:    Support devices: ET tube terminates above the oscar. Enteric tube   courses below the hemidiaphragms. Left chest AICD.    Cardiac/mediastinum/hilum: Unchanged.    Lung parenchyma/Pleura: Mild improvement in right basilar opacity. Stable   left lung opacities. No definite pneumothorax. Please note the bilateral   lower lungs are not completely imaged.    Skeleton/soft tissues: Unchanged      IMPRESSION:    Mild improvement in right basilar opacity. Stable left lung opacities.    --- End of Report ---            OTIS KATE MD; Attending Radiologist  This document has been electronically signed. Jun 17 2025 12:49PM    < end of copied text >

## 2025-06-17 NOTE — CONSULT NOTE ADULT - ASSESSMENT
70yMale with past medical history of  COPD (on 2L home O2), DM 2  HFrEF 30%, CAD,  ischemic cardiomyopathy s/p CRT-D, HTN, paroxysmal A-fib (status post ablation 1/14/25) on amiodarone and  Eliquis who presented  for b/l leg swelling patient admitted with acute respiratory failure, sepsis, possible aspiration pneumonia and new diarrhea. Patient with digni-shield in.  No hematemesis, melena, blood in stool reported, patient had positive fecal occult blood test.     #Acute Diarrhea  #anemia no gross GI bleeding  #fecal occult blood test positive  #leukocytosis  Rec  -check C-diff and GI PCR  -can resume feeds  -can resume AC if needed  -consider CT scan abdomen and pelvis with IV contrast when patient and creatinine stable for it  -Maintain Hemodynamic Stability   -Monitor CBC  -CMP,Optimize Electrolytes  -PT,PTT,INR  -EKG, Chest-Xray   -Transfuse prn to hgb >8  -Two large bore IV lines  -PPI BID  -Monitor Vital Signs  -Monitor Stool For blood, frequency, consistency, melena  -Active Type and Screen  -Iron Studies, Folate, Vitamin B12 levels  - Follow up with our [facility] located at [address] [place] [address]. Phone Number: [phone] for outpatient EGD/Colonoscopy   70yMale with past medical history of  COPD (on 2L home O2), DM 2  HFrEF 30%, CAD,  ischemic cardiomyopathy s/p CRT-D, HTN, paroxysmal A-fib (status post ablation 1/14/25) on amiodarone and  Eliquis who presented  for b/l leg swelling patient admitted with acute respiratory failure, sepsis, possible aspiration pneumonia and new diarrhea. Patient with digni-shield in.  No hematemesis, melena, blood in stool reported, patient had positive fecal occult blood test.     #Acute Diarrhea  #anemia no gross GI bleeding  #fecal occult blood test positive  #leukocytosis  Rec  -check C-diff and GI PCR  -can resume feeds as tolerated  -can resume AC if needed  -consider CT scan abdomen and pelvis with IV contrast when patient and creatinine stable for it  -Maintain Hemodynamic Stability   -Monitor CBC  -CMP,Optimize Electrolytes  -PT,PTT,INR  -EKG, Chest-Xray   -Transfuse prn to hgb >8  -Two large bore IV lines  -PPI BID  -Monitor Vital Signs  -Monitor Stool For blood, frequency, consistency, melena  -Active Type and Screen  -Iron Studies, Folate, Vitamin B12 levels  - Follow up with our [facility] located at [address] [place] [address]. Phone Number: [phone] for outpatient EGD/Colonoscopy

## 2025-06-17 NOTE — PROGRESS NOTE ADULT - SUBJECTIVE AND OBJECTIVE BOX
positive bloody BM     - H/H dropped   - hold Eliquis and Plavix   - NPO   - IV Protonix   - GI consult

## 2025-06-17 NOTE — PROGRESS NOTE ADULT - SUBJECTIVE AND OBJECTIVE BOX
Patient seen and evaluated this am, sedated on ventilator with fentanyl and Precedex, multiple (5)  liquid BM's over past day      T(F): 100.2 (06-17-25 @ 07:11), Max: 102.9 (06-16-25 @ 23:01)  HR: 82 (06-17-25 @ 09:11)  BP: 80/48 (06-17-25 @ 08:30)  RR: 26 (06-17-25 @ 08:30)  SpO2: 100% (06-17-25 @ 09:11) (92% - 100%)    PHYSICAL EXAM:  GENERAL: NAD  HEAD:  Atraumatic, Normocephalic  EYES: EOMI, PERRLA, conjunctiva and sclera clear  NERVOUS SYSTEM:  no focal deficits   CHEST/LUNG: Clear to percussion bilaterally; No rales, rhonchi, wheezing, or rubs  HEART: Regular rate and rhythm; No murmurs, rubs, or gallops  ABDOMEN: Soft, Nontender, Nondistended; Bowel sounds present  EXTREMITIES:  2+ Peripheral Pulses, No clubbing, cyanosis, or edema    LABS  06-17    141  |  107  |  59[H]  ----------------------------<  167[H]  4.9   |  24  |  1.9[H]    Ca    8.3[L]      17 Jun 2025 06:06  Mg     2.6     06-17    TPro  4.9[L]  /  Alb  2.6[L]  /  TBili  0.2  /  DBili  x   /  AST  12  /  ALT  7   /  AlkPhos  62  06-17                          11.5   22.35 )-----------( 233      ( 17 Jun 2025 06:06 )             39.1       Mode: AC/ CMV (Assist Control/ Continuous Mandatory Ventilation)  RR (machine): 26  TV (machine): 400  FiO2: 40  PEEP: 8      Culture Results:   Commensal magda consistent with body site (06-15-25)  Culture Results:   No growth at 48 Hours (06-14-25)  Culture Results:   No growth at 5 days (06-02-25)  Culture Results:   No growth at 5 days (06-02-25)    RADIOLOGY  < from: Xray Chest 1 View-PORTABLE IMMEDIATE (Xray Chest 1 View-PORTABLE IMMEDIATE .) (06.16.25 @ 17:06) >  IMPRESSION:    Mild increase in right basilar opacity/effusion. Stable left lung opacity.      < end of copied text >    MEDICATIONS  (STANDING):  aMIOdarone    Tablet 200 milliGRAM(s) Oral daily  apixaban 5 milliGRAM(s) Enteral Tube two times a day  atorvastatin 40 milliGRAM(s) Oral at bedtime  cefepime   IVPB 1000 milliGRAM(s) IV Intermittent every 12 hours  chlorhexidine 0.12% Liquid 15 milliLiter(s) Oral Mucosa every 12 hours  chlorhexidine 2% Cloths 1 Application(s) Topical <User Schedule>  clopidogrel Tablet 75 milliGRAM(s) Enteral Tube daily  dexMEDEtomidine Infusion 0.05 MICROgram(s)/kG/Hr (1.04 mL/Hr) IV Continuous <Continuous>  ezetimibe 10 milliGRAM(s) Oral daily  famotidine    Tablet 20 milliGRAM(s) Oral daily  fentaNYL   Infusion. 0.508 MICROgram(s)/kG/Hr (4.23 mL/Hr) IV Continuous <Continuous>  insulin glargine Injectable (LANTUS) 34 Unit(s) SubCutaneous every morning  insulin lispro (ADMELOG) corrective regimen sliding scale   SubCutaneous every 6 hours  midodrine 10 milliGRAM(s) Oral every 8 hours  mupirocin 2% Nasal 1 Application(s) Both Nostrils every 12 hours  norepinephrine Infusion 0.05 MICROgram(s)/kG/Min (7.92 mL/Hr) IV Continuous <Continuous>  propofol Infusion 5 MICROgram(s)/kG/Min (2.54 mL/Hr) IV Continuous <Continuous>  QUEtiapine 25 milliGRAM(s) Oral at bedtime    MEDICATIONS  (PRN):  acetaminophen     Tablet .. 650 milliGRAM(s) Oral every 6 hours PRN Temp greater or equal to 38C (100.4F), Mild Pain (1 - 3)  albuterol/ipratropium for Nebulization 3 milliLiter(s) Nebulizer every 6 hours PRN Shortness of Breath and/or Wheezing  aluminum hydroxide/magnesium hydroxide/simethicone Suspension 30 milliLiter(s) Oral every 4 hours PRN Dyspepsia  dextrose Oral Gel 15 Gram(s) Oral once PRN Blood Glucose LESS THAN 70 milliGRAM(s)/deciliter  guaiFENesin Oral Liquid (Sugar-Free) 200 milliGRAM(s) Oral every 6 hours PRN Cough  melatonin 5 milliGRAM(s) Oral at bedtime PRN Insomnia  ondansetron Injectable 4 milliGRAM(s) IV Push every 8 hours PRN Nausea and/or Vomiting        Pending/Follow up items (tests, labs, procedures,consults):    Indication for continued inpatient management:    Goals of Care note:     Family contact:  Dispo (home, SNF, etc):    Prepare for DC order [x] - updated MARLENY is:   DC provider note prep:    -------------------------------Time spent-----------------------------------------------------------------------  Initial visit:             mins [ ] -- 55-74 mins [ ]  Subsequent visit: 50-79 mins[ ]    -- 35-49mins [ ]     --------------------------------# and complexity of problems---------------------------------------------  [ ] chronic illness with severe exacerbation , progression, treatment side effect  [ ] acute or chronic illness with threat to life or bodily funtion                         --------------------------------Complexity of data reviewed ----------------------------------------------  The Patients complexity of data reviewed  is Low [ ] Moderate [ ] High [ ] due to the following:   A:      Reviewed prior external records [ ]      Considering/ Ordered a unique test:  Labs [x ] Imaging [x ] Stress Test  [ ] Other: Specify [ ]      Reviewed each unique test result [x ]      Assessment requiring an independent historian [ ]  B:       Independent interpretation of:   X-Ray [x ] EKG [ ]  Other: Specify  [ ]  C:       Discussion of management of tests with clinician outside of my group [ ]    -------------------------------------Risk of Morbidity-------------------------------------------------------  The Patients risk of morbidity is Low [ ] Moderate [ ] High [ ] due to the following:   Mod:  Prescription Drug Management [ ]  Decision regarding elective or emergent: minor surgery [ ] major surgery [ ]  Decision regarding hospitalization or escalation of hospital-level care [ ]  SDOH: Hearing/Vision impaired [ ] Food insecurity [ ] Homelessness/unsafe condition [ ]   Financial strain [ ] un/underemployed [ ] Lack of Transport [ ]  High:  DNR or De-Escalation of care because of poor prognosis [ ]  Drug Therapy requiring intensive monitoring for toxicity [ ]  Parenteral controlled substance [ ]

## 2025-06-17 NOTE — PROGRESS NOTE ADULT - ASSESSMENT
This is a 70 year old male with past medical history of  COPD (on 2L home O2), DM 2  HFrEF 30%, CAD,  ischemic cardiomyopathy s/p CRT-D, HTN, paroxysmal A-fib (status post ablation 1/14/25) on amiodarone and  Eliquis who presented to ED w c/o LESLIE and b/l  leg swelling.    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  #Obesity BMI (kg/m2): 32.5, 28.7, 29.6, 30.1    Recently Enterovirus Positive Discharged on PO steroids Vantin+Doxy    Covid/Flu/RSV negative  MRSA nares positive    Blood cultures NGTD  Sputum cultures Normal Respiratory magda    RECOMMENDATIONS  -Cardiology follow up. Diuresis  -IV Vanc. Dosing as per pharmacy.  -IV Cefepime 1 gram q 12 hours. (S/D 6/14)  -Off loading to prevent pressure sores and preventive measures to avoid aspiration. TOV. GOC. Recurrent admissions expected.     If any questions, please send a message or call on Tail Teams  Please continue to update ID with any pertinent new laboratory or radiographic findings.    Benigno Pond M.D  Infectious Diseases Attending/   [employer] at \[facility]\""/[facility]   This is a 70 year old male with past medical history of  COPD (on 2L home O2), DM 2  HFrEF 30%, CAD,  ischemic cardiomyopathy s/p CRT-D, HTN, paroxysmal A-fib (status post ablation 1/14/25) on amiodarone and  Eliquis who presented to ED w c/o LESLIE and b/l  leg swelling.    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  #Obesity BMI (kg/m2): 32.5, 28.7, 29.6, 30.1    Recently Enterovirus Positive Discharged on PO steroids Vantin+Doxy    Covid/Flu/RSV negative  MRSA nares positive    Blood cultures NGTD  Sputum cultures Normal Respiratory magda  C.diff negative     RECOMMENDATIONS  -Follow up with repeat blood cultures. Unclear cause of fevers- Possible drug induced vs GI bleeding?  -Cardiology follow up. Diuresis.  -CT abdomen?pelvis as per GI. Can also obtain CT chest as well in the same setting.  -IV Vanc. Dosing as per pharmacy.  -IV Cefepime 2 gram q 12 hours. (S/D 6/14)  -Off loading to prevent pressure sores and preventive measures to avoid aspiration. TOV. GOC. Recurrent admissions expected.     If any questions, please send a message or call on zlien Teams  Please continue to update ID with any pertinent new laboratory or radiographic findings.    Benigno Pond M.D  Infectious Diseases Attending/   [employer] at [facility]

## 2025-06-17 NOTE — PROGRESS NOTE ADULT - ASSESSMENT
70 year old male with media l history of CKD3,  COPD on 2L home O2, diabetes, chronic  HFrEF  s/p CRT-D, AVR, hypertension, paroxysmal A-fib (status post ablation 1/14/25)  CAD admitted  for dyspnea on exertion and leg swelling.     acute respiratory failure / sepsis  / possible aspiration pneumonia / diarrhea      - febrile on Levophed with loose BM's   - send c-diff and start oral vanco if OK with ID   - titrate pressors to maintain MAP>65   - continue IV antibiotics as per ID   - I discussed plan with CC team   - 50 minutes total spent today on patient care .

## 2025-06-17 NOTE — CONSULT NOTE ADULT - NS ATTEND AMEND GEN_ALL_CORE FT
69yo male with respiratory failure requiring intubation asked to evaluate for possible rectal bleeding and positive FOBT. Pt having diarrhea, no overt GI bleeding currently. Abd soft benign. Recommend obtain CT abd/pelvis when able, check stool tests including c difficile, monitor WCC, and complete infectious workup per team. If overt GI bleeding or drop in Hb please notify GI.

## 2025-06-17 NOTE — PROGRESS NOTE ADULT - SUBJECTIVE AND OBJECTIVE BOX
HPI: 70M with PMH of COPD, DM2, HFrEF, CAD, ICM s/p CRT-D, HTN, pAF< eliquis here with LESLIE and bilateral leg swelling, and found to have acute HFrEF and pulmonary edema, on vent, nebs, steroids, and bumex. Will need ischemic eval eventually. Patient was DNR/DNI/trial NIV on recent admission after d/w palliative, but wife opted this admission for intubation. Palliative c/s for GOC.    INTERVAL EVENTS  6/17: patient comfortable, intubated, on pressors    ADVANCE DIRECTIVES:    [X ] Full Code [ ] DNR  MOLST  [ ]  Living Will  [ ]   DECISION MAKER(s):  [ ] Health Care Proxy(s)  [ ] Surrogate(s)  [ ] Guardian           Name(s): Phone Number(s): wife [phone] | [phone]    BASELINE (I)ADL(s) (prior to admission):  [place]: [ ]Total  [ ] Moderate [ ]Dependent  Palliative Performance Status Version 2:         %    http://npcrc.org/files/news/palliative_performance_scale_ppsv2.pdf    Allergies    Bactrim (Unknown)  sulfa drugs (Unknown)    Intolerances    MEDICATIONS  (STANDING):  aMIOdarone    Tablet 200 milliGRAM(s) Oral daily  atorvastatin 40 milliGRAM(s) Oral at bedtime  cefepime   IVPB 1000 milliGRAM(s) IV Intermittent every 12 hours  chlorhexidine 0.12% Liquid 15 milliLiter(s) Oral Mucosa every 12 hours  chlorhexidine 2% Cloths 1 Application(s) Topical <User Schedule>  dexMEDEtomidine Infusion 0.05 MICROgram(s)/kG/Hr (1.04 mL/Hr) IV Continuous <Continuous>  dextrose 5%. 1000 milliLiter(s) (100 mL/Hr) IV Continuous <Continuous>  dextrose 5%. 1000 milliLiter(s) (50 mL/Hr) IV Continuous <Continuous>  dextrose 50% Injectable 25 Gram(s) IV Push once  dextrose 50% Injectable 12.5 Gram(s) IV Push once  dextrose 50% Injectable 25 Gram(s) IV Push once  ezetimibe 10 milliGRAM(s) Oral daily  fentaNYL   Infusion. 0.508 MICROgram(s)/kG/Hr (4.23 mL/Hr) IV Continuous <Continuous>  glucagon  Injectable 1 milliGRAM(s) IntraMuscular once  insulin glargine Injectable (LANTUS) 34 Unit(s) SubCutaneous every morning  insulin lispro (ADMELOG) corrective regimen sliding scale   SubCutaneous every 6 hours  midodrine 10 milliGRAM(s) Oral every 8 hours  mupirocin 2% Nasal 1 Application(s) Both Nostrils every 12 hours  norepinephrine Infusion 0.05 MICROgram(s)/kG/Min (7.92 mL/Hr) IV Continuous <Continuous>  pantoprazole  Injectable 40 milliGRAM(s) IV Push two times a day  propofol Infusion 5 MICROgram(s)/kG/Min (2.54 mL/Hr) IV Continuous <Continuous>  QUEtiapine 25 milliGRAM(s) Oral at bedtime    MEDICATIONS  (PRN):  acetaminophen     Tablet .. 650 milliGRAM(s) Oral every 6 hours PRN Temp greater or equal to 38C (100.4F), Mild Pain (1 - 3)  albuterol/ipratropium for Nebulization 3 milliLiter(s) Nebulizer every 6 hours PRN Shortness of Breath and/or Wheezing  aluminum hydroxide/magnesium hydroxide/simethicone Suspension 30 milliLiter(s) Oral every 4 hours PRN Dyspepsia  dextrose Oral Gel 15 Gram(s) Oral once PRN Blood Glucose LESS THAN 70 milliGRAM(s)/deciliter  guaiFENesin Oral Liquid (Sugar-Free) 200 milliGRAM(s) Oral every 6 hours PRN Cough  melatonin 5 milliGRAM(s) Oral at bedtime PRN Insomnia  ondansetron Injectable 4 milliGRAM(s) IV Push every 8 hours PRN Nausea and/or Vomiting      PRESENT SYMPTOMS: [X ]Unable to obtain due to poor mentation   Source if other than patient:  [ ]Family   [ ]Team   [ X]All review of systems negative including pain and dyspnea unless noted below    All components of pain assessment below addressed. Blank spaces indicate that the patient did/could not complete the assessment.  Pain: [ ]yes [ ]no  QOL impact -   Location -                    Aggravating factors -  Quality -  Radiation -  Timing-  Severity (0-10 scale):  Minimal acceptable level (0-10 scale):     CPOT:    https://www.[email or web address]/getattachment/[email or web address]/Critical-Care-Pain-Observation-Tool-(CPOT)    PAIN AD Score: 0  http://geriatrictoolkit.[facility]/cog/painad.pdf (press ctrl +  left click to view)    Dyspnea:                           [ ]None[ ]Mild [ ]Moderate [ ]Severe     Respiratory Distress Observation Scale (RDOS): 1  A score of 0 to 2 signifies little or no respiratory distress, 3 signifies mild distress, scores 4 to 6 indicate moderate distress, and scores greater than 7 signify severe distress  https://[email or web address].ca/sites/default/files/PDFS/531335-uyxcftwnymt-kmlbuhbr-sqqafksnfzc-nzvdw.pdf    Anxiety:                             [ ]None[ ]Mild [ ]Moderate [ ]Severe   Fatigue:                             [ ]None[ ]Mild [ ]Moderate [ ]Severe   Nausea:                             [ ]None[ ]Mild [ ]Moderate [ ]Severe   Loss of appetite:              [ ]None[ ]Mild [ ]Moderate [ ]Severe   Constipation:                    [ ]None[ ]Mild [ ]Moderate [ ]Severe    Other Symptoms:    PHYSICAL EXAM:  Vital Signs Last 24 Hrs  T(C): 37.8 (17 Jun 2025 11:01), Max: 39.4 (16 Jun 2025 23:01)  T(F): 100 (17 Jun 2025 11:01), Max: 102.9 (16 Jun 2025 23:01)  HR: 88 (17 Jun 2025 14:24) (64 - 778)  BP: 86/50 (17 Jun 2025 11:00) (80/48 - 130/58)  BP(mean): 63 (17 Jun 2025 11:00) (60 - 86)  RR: 26 (17 Jun 2025 11:01) (20 - 26)  SpO2: 100% (17 Jun 2025 14:24) (92% - 100%)    Parameters below as of 17 Jun 2025 04:00  Patient On (Oxygen Delivery Method): ventilator    O2 Concentration (%): 40        GENERAL:  [X ] No acute distress [ ]Lethargic  [ ]Unarousable  [ ]Verbal  [ ]Non-Verbal [ ]Cachexia    BEHAVIORAL/PSYCH:  [ ]Alert and Oriented x  [ ] Anxiety [ ] Delirium [ ] Agitation [X ] Calm   EYES: [ ] No scleral icterus [ ] Scleral icterus [ ] Closed  ENMT:  [ ]Dry mouth  [ ]No external oral lesions [ X] No external ear or nose lesions  CARDIOVASCULAR:  [ ]Regular [ ]Irregular [ ]Tachy [ ]Not Tachy  [ ]Aubrey [ ] Edema [ ] No edema  PULMONARY:  [ ]Tachypnea  [ ]Audible excessive secretions [X ] No labored breathing [ ] labored breathing  GASTROINTESTINAL: [ ]Soft  [ ]Distended  [ X]Not distended [ ]Non tender [ ]Tender  MUSCULOSKELETAL: [ ]No clubbing [ ] clubbing  [ X] No cyanosis [ ] cyanosis  NEUROLOGIC: [ ]No focal deficits  [ ]Follows commands  [ ]Does not follow commands  [ ]Cognitive impairment  [ ]Dysphagia  [ ]Dysarthria  [ ]Paresis   SKIN: [ ] Jaundiced [X ] Non-jaundiced [ ]Rash [ ]No Rash [ ] Warm [ ] Dry  MISC/LINES: [ ] ET tube [ ] Trach [ ]NGT/OGT [ ]PEG [ ]Mcmahon    CRITICAL CARE:  [ ] Shock Present  [ ]Septic [ ]Cardiogenic [ ]Neurologic [ ]Hypovolemic  [ ]  Vasopressors [ ]  Inotropes   [ ]Respiratory failure present [ ]Mechanical ventilation [ ]Non-invasive ventilatory support [ ]High flow  [ ]Acute  [ ]Chronic [ ]Hypoxic  [ ]Hypercarbic [ ]Other  [ ]Other organ failure     LABS: reviewed by me, notable for: elevated WBC, SCr; UA WNL                                   11.5   22.35 )-----------( 233      ( 17 Jun 2025 06:06 )             39.1     06-17    141  |  107  |  59[H]  ----------------------------<  167[H]  4.9   |  24  |  1.9[H]    Ca    8.3[L]      17 Jun 2025 06:06  Mg     2.6     06-17          RADIOLOGY & ADDITIONAL STUDIES: CXR personally reviewed by me: 6/17: possibly b/l opacities    PROTEIN CALORIE MALNUTRITION PRESENT: [ ]mild [ ]moderate [ ]severe [ ]underweight [ ]morbid obesity  https://www.andeal.org/vault/8580/web/files/ONC/Table_Clinical%20Characteristics%20to%20Document%20Malnutrition-White%20JV%20et%20al%202012.pdf    Height (cm): 160 (06-14-25 @ 08:00), 162.6 (06-03-25 @ 14:33), 160 (05-30-25 @ 11:11)  Weight (kg): 83.3 (06-14-25 @ 08:00), 75.9 (06-03-25 @ 14:33), 75.7 (05-30-25 @ 11:11)  BMI (kg/m2): 32.5 (06-14-25 @ 08:00), 28.7 (06-03-25 @ 14:33), 29.6 (05-30-25 @ 11:11)    [ ]PPSV2 < or = to 30% [ ]significant weight loss  [ ]poor nutritional intake  [ ]anasarca      [ ]Artificial Nutrition          Palliative Care Spiritual/Emotional Screening Tool Question  Severity (0-4):                    OR                    [X ] Unable to determine/NA  Score of 2 or greater indicates recommendation of Chaplaincy referral  Chaplaincy Referral: [ ] Yes [ ] Refused [ ] Following     Caregiver Agar:  [ ] Yes [ ] No    OR    [x ] Unable to determine. Will assess at later time if appropriate.  Social Work Referral [ ]  Patient and Family Centered Care Referral [ ]    Anticipatory Grief Present: [ ] Yes [ ] No    OR     [ x] Unable to determine. Will assess at later time if appropriate.  Social Work Referral [ ]  Patient and Family Centered Care Referral [ ]    REFERRALS:   [ ]Chaplaincy  [ ]Hospice  [ ]Child Life  [ ]Social Work  [ ]Case management [ ]Holistic Therapy     Palliative care education provided to patient and/or family    Goals of Care Document:     ______________  Wolfgang Ramos MD  Palliative Medicine  [facility]   of Geriatric and Palliative Medicine  [phone]

## 2025-06-17 NOTE — PROGRESS NOTE ADULT - ASSESSMENT
IMPRESSION:    Acute on chronic HFrEF decompensated heart failure EF 31%   Acute pulmonary edema  COPD, not in active exacerbation  pulm HTN multifactorial secondary to cardiomyopathy reduced EF, likely LAW, baseline COPD  chronic hypoxia from all the above  B/L small pleural effusions to  pulmonary edema    PLAN:    CNS: Daily SAT, Seroquel QHS    HEENT: Oral care    PULMONARY:  HOB @ 45 degrees.  RR 26. repeat ABG. CXR noted, Duonebs q 6 hrs PRN     CARDIOVASCULAR: TTE noted , volume contraction as tolerated, Cardiology follow up, Bumex     GI: GI prophylaxis. Feeding. monitor for recurrent diarrhea SP dignisheild, check C. Diff GI PCR has not been on bowel regimen     RENAL:  Follow up lytes.  Correct as needed, renal US    INFECTIOUS DISEASE: Follow up cultures, MRSA negative, complete Rocephin per ID     HEMATOLOGICAL: On AC     ENDOCRINE:  Follow up FS.  Insulin protocol if needed.    MUSCULOSKELETAL: off loading     Palliative follow up    MICU monitoring

## 2025-06-18 ENCOUNTER — RX RENEWAL (OUTPATIENT)
Age: 70
End: 2025-06-18

## 2025-06-18 NOTE — PROGRESS NOTE ADULT - ASSESSMENT
70yMale with past medical history of  COPD (on 2L home O2), DM 2  HFrEF 30%, CAD,  ischemic cardiomyopathy s/p CRT-D, HTN, paroxysmal A-fib (status post ablation 1/14/25) on amiodarone and  Eliquis who presented  for b/l leg swelling patient admitted with acute respiratory failure, sepsis, possible aspiration pneumonia and new diarrhea. Patient with digni-shield in.  No hematemesis, melena, blood in stool reported, patient had positive fecal occult blood test.     #Acute Diarrhea  #anemia no gross GI bleeding  #fecal occult blood test positive  #leukocytosis  Rec  -check GI PCR  -C-diff WNL  -can resume feeds as tolerated  -can continue AC if needed  -consider CT scan abdomen and pelvis with IV contrast when patient and creatinine stable for it  -Maintain Hemodynamic Stability   -Monitor CBC  -CMP,Optimize Electrolytes  -PT,PTT,INR  -EKG, Chest-Xray   -Transfuse prn to hgb >8  -Two large bore IV lines  -PPI BID  -Monitor Vital Signs  -Monitor Stool For blood, frequency, consistency, melena  -Active Type and Screen  -Iron Studies, Folate, Vitamin B12 levels  - Follow up with our [facility] located at [address] [place] [address]. Phone Number: [phone] for outpatient EGD/Colonoscopy

## 2025-06-18 NOTE — PROGRESS NOTE ADULT - ASSESSMENT
IMPRESSION:    Acute on chronic HFrEF decompensated heart failure EF 31%   Acute pulmonary edema  COPD, not in active exacerbation  pulm HTN multifactorial secondary to cardiomyopathy reduced EF, likely LAW, baseline COPD  chronic hypoxia from all the above  B/L small pleural effusions to  pulmonary edema    PLAN:    CNS: Daily SAT, Seroquel QHS    HEENT: Oral care    PULMONARY:  HOB @ 45 degrees. CXR noted w/congestion, Duonebs q 6 hrs PRN, SBT if passes SAT     CARDIOVASCULAR: TTE noted , volume contraction as tolerated, Cardiology follow up, Bumex 1mg BId scheduled     GI: GI prophylaxis. Feeding. C. Diff GI PCR negative, trend Hb, f/u GI reccs    RENAL:  Follow up lytes. Correct as needed, renal US, scheduled Bumex     INFECTIOUS DISEASE: Follow up cultures, MRSA +, vancomycin and cefepime for now    HEMATOLOGICAL: On AC, resume, okay per GI, f/u Us duplex LE for low grade fevers      ENDOCRINE:  Follow up FS.  Insulin protocol if needed.    MUSCULOSKELETAL: off loading     Palliative follow up  MICU monitoring

## 2025-06-18 NOTE — PROGRESS NOTE ADULT - NS ATTEND AMEND GEN_ALL_CORE FT
Patient seen and examined.  Agree with above.   Recommend continuing IV diuresis to keep O > I   Monitor I/O, Cr, daily weights  Lifelong ac if no contraindications     Armando Herron MD

## 2025-06-18 NOTE — PROGRESS NOTE ADULT - SUBJECTIVE AND OBJECTIVE BOX
Chief complaint: Patient is a 70y old  Male who presents with a chief complaint of SOB (18 Jun 2025 09:13)    Interval history:  Patient remains intubated and sedative.     Past medical history is notable for:  Diabetes  CAD (coronary artery disease) Stented coronary artery  Chronic obstructive pulmonary disease (COPD)  HTN (hypertension)  Dyslipidemia  GERD (gastroesophageal reflux disease)  Afib  CVA (cerebral vascular accident)  Acute on chronic HFrEF (heart failure with reduced ejection fraction)  Acute respiratory failure with hypoxia        Review of systems: a complete 10- point review of systems was obtained and is negative except as stated in the interval history.    Vitals:  T(F): 99.7, Max: 101.8 (06-18 @ 05:00)  HR: 73 (66 - 100)  BP: 143/66 (95/54 - 163/69)  RR: 27 (22 - 50)  SpO2: 100% (99% - 100%)    Ins & outs:     06-15 @ 07:01  -  06-16 @ 07:00  --------------------------------------------------------  IN: 2687 mL / OUT: 1475 mL / NET: 1212 mL    06-16 @ 07:01  -  06-17 @ 07:00  --------------------------------------------------------  IN: 2985.3 mL / OUT: 1850 mL / NET: 1135.3 mL    06-17 @ 07:01  -  06-18 @ 07:00  --------------------------------------------------------  IN: 2045.2 mL / OUT: 2700 mL / NET: -654.8 mL    06-18 @ 07:01  -  06-18 @ 11:42  --------------------------------------------------------  IN: 33.1 mL / OUT: 195 mL / NET: -161.9 mL      Weight trend:  Weight (kg): 83.3 (06-14)    Physical exam:  General: No apparent distress  HEENT: Anicteric sclera. Moist mucous membranes. no JVD noted   Cardiac: Regular rate and rhythm. No murmurs, rubs, or gallops.   Vascular: Symmetric radial pulses. Dorsalis pedis pulses palpable.   Respiratory: diminished at bases   Abdomen: Soft, nontender. Audible bowel sounds.   Extremities: Warm with trace  edema. No cyanosis or clubbing.   Skin: Warm and dry. No rash.   Neurologic: Grossly normal motor function.   Psychiatric: Euthymic. Oriented to person, place, and time.     Data reviewed:  - Telemetry:  - ECG < from: 12 Lead ECG (06.14.25 @ 04:54) >    Ventricular Rate 115 BPM    Atrial Rate 115 BPM    P-R Interval 112 ms    QRS Duration 138 ms    Q-T Interval 364 ms    QTC Calculation(Bazett) 503 ms    P Axis 31 degrees    R Axis 244 degrees    T Axis 53 degrees    Diagnosis Line Atrial-sensed ventricular-paced rhythm  Biventricular pacemaker detected  Abnormal ECG    < end of copied text >    - Echo (< from: TTE Echo Complete w/o Contrast w/ Doppler (06.16.25 @ 10:37) >    CONCLUSIONS:     1. Left ventricular systolic function is moderately decreased with an   ejection fraction of 31 % by Beck's method of disks.   2. Severe left ventricular hypertrophy.   3. There is severe (grade 3) left ventricular diastolic dysfunction.   4. Normal right ventricular cavity size, with normal wall thickness, and   normal right ventricular systolic function. Tricuspid annular plane   systolic excursion (TAPSE) is 1.5 cm (normal >=1.7 cm).   5. Device lead is visualized in the right heart.   6. Left atrium is moderately dilated.   7. A bioprosthetic valve replacement valve replacement is present in the   aortic position The prosthetic valve is well seated. Trace intravalvular   regurgitation.   8. Trace mitral regurgitation at a blood pressure of 119/75 mmHg.   9. Mild tricuspid regurgitation.  10. Estimated pulmonary artery systolic pressure is 44 mmHg, consistent   with mild pulmonary hypertension.  11. No pericardial effusion seen.    < end of copied text >    - Radiology:  < from: Xray Chest 1 View- PORTABLE-Routine (Xray Chest 1 View- PORTABLE-Routine in AM.) (06.17.25 @ 07:21) >  MPRESSION:    Mild improvement in right basilar opacity. Stable left lung opacities.    --- End of Report ---    < end of copied text >    - Labs:                        11.1   23.50 )-----------( 225      ( 17 Jun 2025 19:33 )             37.2     06-17    141  |  107  |  59[H]  ----------------------------<  167[H]  4.9   |  24  |  1.9[H]    Ca    8.3[L]      17 Jun 2025 06:06  Mg     2.6     06-17    TPro  4.9[L]  /  Alb  2.6[L]  /  TBili  0.2  /  DBili  x   /  AST  12  /  ALT  7   /  AlkPhos  62  06-17    LIVER FUNCTIONS - ( 17 Jun 2025 06:06 )  Alb: 2.6 g/dL / Pro: 4.9 g/dL / ALK PHOS: 62 U/L / ALT: 7 U/L / AST: 12 U/L / GGT: x                 Urinalysis Basic - ( 17 Jun 2025 06:06 )    Color: x / Appearance: x / SG: x / pH: x  Gluc: 167 mg/dL / Ketone: x  / Bili: x / Urobili: x   Blood: x / Protein: x / Nitrite: x   Leuk Esterase: x / RBC: x / WBC x   Sq Epi: x / Non Sq Epi: x / Bacteria: x      - Cultures:    Culture - Blood (collected 06-16)  Source: Blood Blood  Preliminary Report:    No growth at 24 hours    Culture - Sputum (collected 06-15)  Source: Sputum Sputum  Gram Stain:    Rare polymorphonuclear leukocytes per low power field    No Squamous epithelial cells per low power field    Rare Yeast like cells seen per oil power field    Rare Gram Positive Cocci in Clusters seen per oil power field  Final Report:    Commensal magda consistent with body site    Culture - Blood (collected 06-14)  Source: Blood None  Preliminary Report:    No growth at 72 Hours      Medications:  aMIOdarone    Tablet 200 milliGRAM(s) Oral daily  apixaban 5 milliGRAM(s) Enteral Tube every 12 hours  atorvastatin 40 milliGRAM(s) Oral at bedtime  bumetanide Injectable 1 milliGRAM(s) IV Push two times a day  cefepime   IVPB 2000 milliGRAM(s) IV Intermittent every 12 hours  chlorhexidine 0.12% Liquid 15 milliLiter(s) Oral Mucosa every 12 hours  chlorhexidine 2% Cloths 1 Application(s) Topical <User Schedule>  clopidogrel Tablet 75 milliGRAM(s) Enteral Tube daily  dextrose 50% Injectable 25 Gram(s) IV Push once  dextrose 50% Injectable 12.5 Gram(s) IV Push once  dextrose 50% Injectable 25 Gram(s) IV Push once  ezetimibe 10 milliGRAM(s) Oral daily  glucagon  Injectable 1 milliGRAM(s) IntraMuscular once  insulin glargine Injectable (LANTUS) 34 Unit(s) SubCutaneous at bedtime  insulin lispro (ADMELOG) corrective regimen sliding scale   SubCutaneous every 6 hours  midodrine 10 milliGRAM(s) Oral every 8 hours  mupirocin 2% Nasal 1 Application(s) Both Nostrils every 12 hours  pantoprazole  Injectable 40 milliGRAM(s) IV Push two times a day  vancomycin  IVPB 750 milliGRAM(s) IV Intermittent every 24 hours    Drips:  dexMEDEtomidine Infusion 0.05 MICROgram(s)/kG/Hr (1.04 mL/Hr) IV Continuous <Continuous>  dextrose 5%. 1000 milliLiter(s) (100 mL/Hr) IV Continuous <Continuous>  dextrose 5%. 1000 milliLiter(s) (50 mL/Hr) IV Continuous <Continuous>  norepinephrine Infusion 0.05 MICROgram(s)/kG/Min (7.92 mL/Hr) IV Continuous <Continuous>  propofol Infusion 5 MICROgram(s)/kG/Min (2.54 mL/Hr) IV Continuous <Continuous>    PRN:

## 2025-06-18 NOTE — PROGRESS NOTE ADULT - ASSESSMENT
70M with PMH of COPD, DM2, HFrEF, CAD, ICM s/p CRT-D, HTN, pAF< eliquis here with LESLIE and bilateral leg swelling, and found to have acute HFrEF and pulmonary edema, on vent, nebs, steroids, and bumex. Will need ischemic eval eventually. Patient was DNR/DNI/trial NIV on recent admission after d/w palliative, but wife opted this admission for intubation. Palliative c/s for GOC.    - see GOC above, d/w jeny Duque (HCP) and Jae  - c/w precedex gtt, IV pressors   - c/w IV abx for possible PNA, including IV vanco  - c/w bumex IV, monitor I/Os  - no acute symptoms  - will follow    ______________  Wolfgang Ramos MD  Palliative Medicine  [facility]   of Geriatric and Palliative Medicine  [phone]

## 2025-06-18 NOTE — PROGRESS NOTE ADULT - NUTRITIONAL ASSESSMENT
- Restart home GDMT for HFrEF, when stable/medically appropriate   - Pt needs ischemic eval, but has refused in the past

## 2025-06-18 NOTE — PROGRESS NOTE ADULT - SUBJECTIVE AND OBJECTIVE BOX
Patient is a 70y old  Male who presents with a chief complaint of SOB (17 Jun 2025 14:43)        Over Night Events: No more episodes of melena or bloody BM. off levophed         ROS:     All ROS are negative except HPI         PHYSICAL EXAM    ICU Vital Signs Last 24 Hrs  T(C): 37.6 (18 Jun 2025 07:05), Max: 38.8 (18 Jun 2025 05:00)  T(F): 99.7 (18 Jun 2025 07:05), Max: 101.8 (18 Jun 2025 05:00)  HR: 76 (18 Jun 2025 07:27) (66 - 100)  BP: 143/66 (18 Jun 2025 07:00) (86/50 - 163/69)  BP(mean): 95 (18 Jun 2025 07:00) (63 - 99)  ABP: --  ABP(mean): --  RR: 27 (18 Jun 2025 07:05) (22 - 50)  SpO2: 100% (18 Jun 2025 07:27) (99% - 100%)    O2 Parameters below as of 18 Jun 2025 07:00  Patient On (Oxygen Delivery Method): ventilator    O2 Concentration (%): 40      CONSTITUTIONAL:   Ill appearing.      ENT:   Airway patent,   Mouth with normal mucosa.   No thrush    EYES:   Pupils equal,   Round and reactive to light.    CARDIAC:   Normal rate,   Regular rhythm.        RESPIRATORY:   No wheezing  Bilateral BS  Normal chest expansion  Not tachypneic,  No use of accessory muscles    GASTROINTESTINAL:  Abdomen soft,   Non-tender,   No guarding,   + BS      MUSCULOSKELETAL:   Range of motion is not limited    NEUROLOGICAL:   sedated     SKIN:   Skin normal color for race        06-17-25 @ 07:01  -  06-18-25 @ 07:00  --------------------------------------------------------  IN:    Dexmedetomidine: 531.2 mL    Enteral Tube Flush: 170 mL    FentaNYL: 30 mL    Free Water: 200 mL    IV PiggyBack: 350 mL    Norepinephrine: 155 mL    Peptamen A.F.: 609 mL  Total IN: 2045.2 mL    OUT:    Indwelling Catheter - Urethral (mL): 2400 mL    Rectal Tube (mL): 300 mL  Total OUT: 2700 mL    Total NET: -654.8 mL      06-18-25 @ 07:01  -  06-18-25 @ 09:13  --------------------------------------------------------  IN:    Dexmedetomidine: 30.1 mL    Norepinephrine: 3 mL  Total IN: 33.1 mL    OUT:    Indwelling Catheter - Urethral (mL): 60 mL  Total OUT: 60 mL    Total NET: -26.9 mL          LABS:                            11.1   23.50 )-----------( 225      ( 17 Jun 2025 19:33 )             37.2                                               06-17    141  |  107  |  59[H]  ----------------------------<  167[H]  4.9   |  24  |  1.9[H]    Ca    8.3[L]      17 Jun 2025 06:06  Mg     2.6     06-17    TPro  4.9[L]  /  Alb  2.6[L]  /  TBili  0.2  /  DBili  x   /  AST  12  /  ALT  7   /  AlkPhos  62  06-17                                             Urinalysis Basic - ( 17 Jun 2025 06:06 )    Color: x / Appearance: x / SG: x / pH: x  Gluc: 167 mg/dL / Ketone: x  / Bili: x / Urobili: x   Blood: x / Protein: x / Nitrite: x   Leuk Esterase: x / RBC: x / WBC x   Sq Epi: x / Non Sq Epi: x / Bacteria: x                                                  LIVER FUNCTIONS - ( 17 Jun 2025 06:06 )  Alb: 2.6 g/dL / Pro: 4.9 g/dL / ALK PHOS: 62 U/L / ALT: 7 U/L / AST: 12 U/L / GGT: x                                                  Culture - Blood (collected 16 Jun 2025 06:28)  Source: Blood Blood  Preliminary Report (17 Jun 2025 17:02):    No growth at 24 hours    Culture - Sputum (collected 15 Kleber 2025 16:44)  Source: Sputum Sputum  Gram Stain (16 Jun 2025 01:30):    Rare polymorphonuclear leukocytes per low power field    No Squamous epithelial cells per low power field    Rare Yeast like cells seen per oil power field    Rare Gram Positive Cocci in Clusters seen per oil power field  Final Report (17 Jun 2025 07:52):    Commensal magda consistent with body site                                                   Mode: AC/ CMV (Assist Control/ Continuous Mandatory Ventilation)  RR (machine): 26  TV (machine): 400  FiO2: 40  PEEP: 8  ITime: 1  MAP: 9  PIP: 18                                      ABG - ( 18 Jun 2025 04:34 )  pH, Arterial: 7.39  pH, Blood: x     /  pCO2: 43    /  pO2: 163   / HCO3: 26    / Base Excess: 0.8   /  SaO2: 100.0         MEDICATIONS  (STANDING):  aMIOdarone    Tablet 200 milliGRAM(s) Oral daily  atorvastatin 40 milliGRAM(s) Oral at bedtime  cefepime   IVPB 2000 milliGRAM(s) IV Intermittent every 12 hours  chlorhexidine 0.12% Liquid 15 milliLiter(s) Oral Mucosa every 12 hours  chlorhexidine 2% Cloths 1 Application(s) Topical <User Schedule>  dexMEDEtomidine Infusion 0.05 MICROgram(s)/kG/Hr (1.04 mL/Hr) IV Continuous <Continuous>  dextrose 5%. 1000 milliLiter(s) (100 mL/Hr) IV Continuous <Continuous>  dextrose 5%. 1000 milliLiter(s) (50 mL/Hr) IV Continuous <Continuous>  dextrose 50% Injectable 25 Gram(s) IV Push once  dextrose 50% Injectable 12.5 Gram(s) IV Push once  dextrose 50% Injectable 25 Gram(s) IV Push once  ezetimibe 10 milliGRAM(s) Oral daily  fentaNYL   Infusion. 0.508 MICROgram(s)/kG/Hr (4.23 mL/Hr) IV Continuous <Continuous>  glucagon  Injectable 1 milliGRAM(s) IntraMuscular once  insulin glargine Injectable (LANTUS) 34 Unit(s) SubCutaneous every morning  insulin lispro (ADMELOG) corrective regimen sliding scale   SubCutaneous every 6 hours  midodrine 10 milliGRAM(s) Oral every 8 hours  mupirocin 2% Nasal 1 Application(s) Both Nostrils every 12 hours  norepinephrine Infusion 0.05 MICROgram(s)/kG/Min (7.92 mL/Hr) IV Continuous <Continuous>  pantoprazole  Injectable 40 milliGRAM(s) IV Push two times a day  propofol Infusion 5 MICROgram(s)/kG/Min (2.54 mL/Hr) IV Continuous <Continuous>  QUEtiapine 25 milliGRAM(s) Oral at bedtime  vancomycin  IVPB 750 milliGRAM(s) IV Intermittent every 24 hours    MEDICATIONS  (PRN):  acetaminophen     Tablet .. 650 milliGRAM(s) Oral every 6 hours PRN Temp greater or equal to 38C (100.4F), Mild Pain (1 - 3)  albuterol/ipratropium for Nebulization 3 milliLiter(s) Nebulizer every 6 hours PRN Shortness of Breath and/or Wheezing  aluminum hydroxide/magnesium hydroxide/simethicone Suspension 30 milliLiter(s) Oral every 4 hours PRN Dyspepsia  dextrose Oral Gel 15 Gram(s) Oral once PRN Blood Glucose LESS THAN 70 milliGRAM(s)/deciliter  guaiFENesin Oral Liquid (Sugar-Free) 200 milliGRAM(s) Oral every 6 hours PRN Cough  melatonin 5 milliGRAM(s) Oral at bedtime PRN Insomnia  ondansetron Injectable 4 milliGRAM(s) IV Push every 8 hours PRN Nausea and/or Vomiting      New X-rays reviewed:                                                                                  ECHO    CXR interpreted by me:

## 2025-06-18 NOTE — PROGRESS NOTE ADULT - SUBJECTIVE AND OBJECTIVE BOX
Patient seen and evaluated this am, sedated on ventilator with Precedex      T(F): 98.8 (06-18-25 @ 11:01), Max: 101.8 (06-18-25 @ 05:00)  HR: 100 (06-18-25 @ 13:00)  BP: 196/88 (06-18-25 @ 13:00)  RR: 24  SpO2: 99% (06-18-25 @ 13:00) (99% - 100%)    PHYSICAL EXAM:  GENERAL: NAD  HEAD:  Atraumatic, Normocephalic  EYES: EOMI, PERRLA, conjunctiva and sclera clear  NERVOUS SYSTEM:  no focal deficits   CHEST/LUNG:  bilateral rhonchi  HEART: Regular rate and rhythm; No murmurs, rubs, or gallops  ABDOMEN: Soft, Nontender, Nondistended; Bowel sounds present  EXTREMITIES:  2+ Peripheral Pulses, No clubbing, cyanosis, or edema    LABS  06-17    141  |  107  |  59[H]  ----------------------------<  167[H]  4.9   |  24  |  1.9[H]    Ca    8.3[L]      17 Jun 2025 06:06  Mg     2.6     06-17    TPro  4.9[L]  /  Alb  2.6[L]  /  TBili  0.2  /  DBili  x   /  AST  12  /  ALT  7   /  AlkPhos  62  06-17                          11.5   21.81 )-----------( 281      ( 18 Jun 2025 13:40 )             38.8       Mode: AC/ CMV (Assist Control/ Continuous Mandatory Ventilation)  RR (machine): 26  TV (machine): 400  FiO2: 40  PEEP: 8      Culture Results:   No growth at 24 hours (06-16-25)  Culture Results:   Commensal magda consistent with body site (06-15-25)  Culture Results:   No growth at 72 Hours (06-14-25)  Culture Results:   No growth at 5 days (06-02-25)  Culture Results:   No growth at 5 days (06-02-25)    RADIOLOGY  < from: Xray Chest 1 View- PORTABLE-Routine (Xray Chest 1 View- PORTABLE-Routine in AM.) (06.17.25 @ 07:21) >    IMPRESSION:    Mild improvement in right basilar opacity. Stable left lung opacities.      < end of copied text >    MEDICATIONS  (STANDING):  aMIOdarone    Tablet 200 milliGRAM(s) Oral daily  apixaban 5 milliGRAM(s) Enteral Tube every 12 hours  atorvastatin 40 milliGRAM(s) Oral at bedtime  bumetanide Injectable 1 milliGRAM(s) IV Push two times a day  cefepime   IVPB 2000 milliGRAM(s) IV Intermittent every 12 hours  chlorhexidine 0.12% Liquid 15 milliLiter(s) Oral Mucosa every 12 hours  chlorhexidine 2% Cloths 1 Application(s) Topical <User Schedule>  clopidogrel Tablet 75 milliGRAM(s) Enteral Tube daily  dexMEDEtomidine Infusion 0.05 MICROgram(s)/kG/Hr (1.04 mL/Hr) IV Continuous <Continuous>  ezetimibe 10 milliGRAM(s) Oral daily  fentaNYL   Infusion. 0.5 MICROgram(s)/kG/Hr (4.17 mL/Hr) IV Continuous <Continuous>  insulin glargine Injectable (LANTUS) 34 Unit(s) SubCutaneous at bedtime  insulin lispro (ADMELOG) corrective regimen sliding scale   SubCutaneous every 6 hours  midodrine 10 milliGRAM(s) Oral every 8 hours  mupirocin 2% Nasal 1 Application(s) Both Nostrils every 12 hours  norepinephrine Infusion 0.05 MICROgram(s)/kG/Min (7.92 mL/Hr) IV Continuous <Continuous>  pantoprazole  Injectable 40 milliGRAM(s) IV Push two times a day  propofol Infusion 5 MICROgram(s)/kG/Min (2.54 mL/Hr) IV Continuous <Continuous>  vancomycin  IVPB 750 milliGRAM(s) IV Intermittent every 24 hours    MEDICATIONS  (PRN):  acetaminophen     Tablet .. 650 milliGRAM(s) Oral every 6 hours PRN Temp greater or equal to 38C (100.4F), Mild Pain (1 - 3)  albuterol/ipratropium for Nebulization 3 milliLiter(s) Nebulizer every 6 hours PRN Shortness of Breath and/or Wheezing  aluminum hydroxide/magnesium hydroxide/simethicone Suspension 30 milliLiter(s) Oral every 4 hours PRN Dyspepsia  dextrose Oral Gel 15 Gram(s) Oral once PRN Blood Glucose LESS THAN 70 milliGRAM(s)/deciliter  guaiFENesin Oral Liquid (Sugar-Free) 200 milliGRAM(s) Oral every 6 hours PRN Cough  melatonin 5 milliGRAM(s) Oral at bedtime PRN Insomnia  ondansetron Injectable 4 milliGRAM(s) IV Push every 8 hours PRN Nausea and/or Vomiting        Pending/Follow up items (tests, labs, procedures,consults):    Indication for continued inpatient management:    Goals of Care note:     Family contact:  Dispo (home, SNF, etc):    Prepare for DC order [x] - updated MARLENY is:   DC provider note prep:    -------------------------------Time spent-----------------------------------------------------------------------  Initial visit:             mins [ ] -- 55-74 mins [ ]  Subsequent visit: 50-79 mins[ ]    -- 35-49mins [ ]     --------------------------------# and complexity of problems---------------------------------------------  [ ] chronic illness with severe exacerbation , progression, treatment side effect  [ ] acute or chronic illness with threat to life or bodily funtion                         --------------------------------Complexity of data reviewed ----------------------------------------------  The Patients complexity of data reviewed  is Low [ ] Moderate [ ] High [ ] due to the following:   A:      Reviewed prior external records [ ]      Considering/ Ordered a unique test:  Labs [x ] Imaging [x ] Stress Test  [ ] Other: Specify [ ]      Reviewed each unique test result [x ]      Assessment requiring an independent historian [ ]  B:       Independent interpretation of:   X-Ray [x ] EKG [ ]  Other: Specify  [ ]  C:       Discussion of management of tests with clinician outside of my group [ ]    -------------------------------------Risk of Morbidity-------------------------------------------------------  The Patients risk of morbidity is Low [ ] Moderate [ ] High [ ] due to the following:   Mod:  Prescription Drug Management [ ]  Decision regarding elective or emergent: minor surgery [ ] major surgery [ ]  Decision regarding hospitalization or escalation of hospital-level care [ ]  SDOH: Hearing/Vision impaired [ ] Food insecurity [ ] Homelessness/unsafe condition [ ]   Financial strain [ ] un/underemployed [ ] Lack of Transport [ ]  High:  DNR or De-Escalation of care because of poor prognosis [ ]  Drug Therapy requiring intensive monitoring for toxicity [ ]  Parenteral controlled substance [ ]

## 2025-06-18 NOTE — PROGRESS NOTE ADULT - ASSESSMENT
70 year old male with media l history of CKD3,  COPD on 2L home O2, diabetes, chronic  HFrEF  s/p CRT-D, AVR, hypertension, paroxysmal A-fib (status post ablation 1/14/25)  CAD admitted  for dyspnea on exertion and leg swelling.     acute respiratory failure / sepsis  / aspiration pneumonia / diarrhea      - febrile on Levophed with loose BM's   - c-diff is negative   - titrate pressors to maintain MAP>65   - continue IV antibiotics as per ID   - Bumex as per cardiology   - I discussed plan with CC team   - 50 minutes total spent today on patient care .

## 2025-06-18 NOTE — PROGRESS NOTE ADULT - SUBJECTIVE AND OBJECTIVE BOX
70yMale  Being followed for diarrhea, fecal occult blood positive   Interval history:  No hematemesis, melena, blood in stool reported. no diarrhea today.      PAST MEDICAL & SURGICAL HISTORY:   CHF (congestive heart failure)      Diabetes      CAD (coronary artery disease)      Chronic obstructive pulmonary disease (COPD)      HTN (hypertension)      Stented coronary artery      Dyslipidemia      GERD (gastroesophageal reflux disease)      Afib      CVA (cerebral vascular accident)      H/O heart artery stent      Heart valve replaced  aortic      History of Nissen fundoplication                Social History: No smoking. No alcohol. No illegal drug use.            MEDICATIONS  (STANDING):  aMIOdarone    Tablet 200 milliGRAM(s) Oral daily  apixaban 5 milliGRAM(s) Enteral Tube every 12 hours  atorvastatin 40 milliGRAM(s) Oral at bedtime  bumetanide Injectable 1 milliGRAM(s) IV Push two times a day  cefepime   IVPB 2000 milliGRAM(s) IV Intermittent every 12 hours  chlorhexidine 0.12% Liquid 15 milliLiter(s) Oral Mucosa every 12 hours  chlorhexidine 2% Cloths 1 Application(s) Topical <User Schedule>  clopidogrel Tablet 75 milliGRAM(s) Enteral Tube daily  dexMEDEtomidine Infusion 0.05 MICROgram(s)/kG/Hr (1.04 mL/Hr) IV Continuous <Continuous>  dextrose 5%. 1000 milliLiter(s) (100 mL/Hr) IV Continuous <Continuous>  dextrose 5%. 1000 milliLiter(s) (50 mL/Hr) IV Continuous <Continuous>  dextrose 50% Injectable 25 Gram(s) IV Push once  dextrose 50% Injectable 12.5 Gram(s) IV Push once  dextrose 50% Injectable 25 Gram(s) IV Push once  ezetimibe 10 milliGRAM(s) Oral daily  glucagon  Injectable 1 milliGRAM(s) IntraMuscular once  insulin glargine Injectable (LANTUS) 34 Unit(s) SubCutaneous at bedtime  insulin lispro (ADMELOG) corrective regimen sliding scale   SubCutaneous every 6 hours  midodrine 10 milliGRAM(s) Oral every 8 hours  mupirocin 2% Nasal 1 Application(s) Both Nostrils every 12 hours  norepinephrine Infusion 0.05 MICROgram(s)/kG/Min (7.92 mL/Hr) IV Continuous <Continuous>  pantoprazole  Injectable 40 milliGRAM(s) IV Push two times a day  propofol Infusion 5 MICROgram(s)/kG/Min (2.54 mL/Hr) IV Continuous <Continuous>  vancomycin  IVPB 750 milliGRAM(s) IV Intermittent every 24 hours    MEDICATIONS  (PRN):  acetaminophen     Tablet .. 650 milliGRAM(s) Oral every 6 hours PRN Temp greater or equal to 38C (100.4F), Mild Pain (1 - 3)  albuterol/ipratropium for Nebulization 3 milliLiter(s) Nebulizer every 6 hours PRN Shortness of Breath and/or Wheezing  aluminum hydroxide/magnesium hydroxide/simethicone Suspension 30 milliLiter(s) Oral every 4 hours PRN Dyspepsia  dextrose Oral Gel 15 Gram(s) Oral once PRN Blood Glucose LESS THAN 70 milliGRAM(s)/deciliter  guaiFENesin Oral Liquid (Sugar-Free) 200 milliGRAM(s) Oral every 6 hours PRN Cough  melatonin 5 milliGRAM(s) Oral at bedtime PRN Insomnia  ondansetron Injectable 4 milliGRAM(s) IV Push every 8 hours PRN Nausea and/or Vomiting      Allergies:   Bactrim (Unknown)  sulfa drugs (Unknown)  Intolerances          REVIEW OF SYSTEMS:  unobtainable       VITAL SIGNS:   T(F): 98.8 (06-18-25 @ 11:01), Max: 101.8 (06-18-25 @ 05:00)  HR: 73 (06-18-25 @ 09:51) (66 - 100)  BP: 143/66 (06-18-25 @ 07:00) (106/59 - 163/69)  RR: 27 (06-18-25 @ 11:01) (22 - 50)  SpO2: 100% (06-18-25 @ 09:51) (100% - 100%)    PHYSICAL EXAM:  GENERAL: +intubated  HEAD:  Atraumatic, Normocephalic  EYES: conjunctiva and sclera clear  NECK: Supple, no thyromegaly  CHEST/LUNG: b/l rhonchi  HEART: Regular rate and rhythm; normal S1, S2, No murmurs.  ABDOMEN: Soft, nontender, nondistended; Bowel sounds present  NEUROLOGY: No asterixis or tremor.   SKIN: Intact, no jaundice            LABS:                        11.1   23.50 )-----------( 225      ( 17 Jun 2025 19:33 )             37.2     06-17    141  |  107  |  59[H]  ----------------------------<  167[H]  4.9   |  24  |  1.9[H]    Ca    8.3[L]      17 Jun 2025 06:06  Mg     2.6     06-17    TPro  4.9[L]  /  Alb  2.6[L]  /  TBili  0.2  /  DBili  x   /  AST  12  /  ALT  7   /  AlkPhos  62  06-17    LIVER FUNCTIONS - ( 17 Jun 2025 06:06 )  Alb: 2.6 g/dL / Pro: 4.9 g/dL / ALK PHOS: 62 U/L / ALT: 7 U/L / AST: 12 U/L / GGT: x               IMAGING:      < from: US Abdomen Upper Quadrant Right (01.03.25 @ 15:45) >    ACC: [id number] EXAM:  US ABDOMEN RT UPR QUADRANT   ORDERED BY: MONIQUE KEARNS     PROCEDURE DATE:  01/03/2025          INTERPRETATION:  CLINICAL INFORMATION: Elevated LFTs    COMPARISON: None available.    TECHNIQUE: Sonography of the right upper quadrant.    FINDINGS:    Limited portable exam.    Liver: Poorly evaluated.  Bile ducts: Normal caliber. Common bile duct measures 4 mm.  Gallbladder: Within normal limits.  Pancreas: Obscured.  Right kidney: 11.4 cm. No hydronephrosis. 1.0 cm cyst.  Ascites: None.    Right pleural effusion.    IMPRESSION:  Limited exam.    No sonographic evidence of acute cholecystitis.    Right pleural effusion.    --- End of Report ---            REY HAYDEN MD; Attending Radiologist  This document hasbeen electronically signed. Dameon  3 2025  3:56PM    < end of copied text >

## 2025-06-18 NOTE — PROGRESS NOTE ADULT - SUBJECTIVE AND OBJECTIVE BOX
HPI: 70M with PMH of COPD, DM2, HFrEF, CAD, ICM s/p CRT-D, HTN, pAF< eliquis here with LESLIE and bilateral leg swelling, and found to have acute HFrEF and pulmonary edema, on vent, nebs, steroids, and bumex. Will need ischemic eval eventually. Patient was DNR/DNI/trial NIV on recent admission after d/w palliative, but wife opted this admission for intubation. Palliative c/s for GOC.    INTERVAL EVENTS  6/18: patient with no acute distress    ADVANCE DIRECTIVES:    [X ] Full Code [ ] DNR  MOLST  [ ]  Living Will  [ ]   DECISION MAKER(s):  [ ] Health Care Proxy(s)  [ ] Surrogate(s)  [ ] Guardian           Name(s): Phone Number(s): wife, son Neto    BASELINE (I)ADL(s) (prior to admission):  Hext: [ ]Total  [ ] Moderate [ ]Dependent  Palliative Performance Status Version 2:         %    http://npcrc.org/files/news/palliative_performance_scale_ppsv2.pdf    Allergies    Bactrim (Unknown)  sulfa drugs (Unknown)    Intolerances    MEDICATIONS  (STANDING):  aMIOdarone    Tablet 200 milliGRAM(s) Oral daily  apixaban 5 milliGRAM(s) Enteral Tube every 12 hours  atorvastatin 40 milliGRAM(s) Oral at bedtime  bumetanide Injectable 1 milliGRAM(s) IV Push two times a day  cefepime   IVPB 2000 milliGRAM(s) IV Intermittent every 12 hours  chlorhexidine 0.12% Liquid 15 milliLiter(s) Oral Mucosa every 12 hours  chlorhexidine 2% Cloths 1 Application(s) Topical <User Schedule>  clopidogrel Tablet 75 milliGRAM(s) Enteral Tube daily  dexMEDEtomidine Infusion 0.05 MICROgram(s)/kG/Hr (1.04 mL/Hr) IV Continuous <Continuous>  dextrose 5%. 1000 milliLiter(s) (100 mL/Hr) IV Continuous <Continuous>  dextrose 5%. 1000 milliLiter(s) (50 mL/Hr) IV Continuous <Continuous>  dextrose 50% Injectable 25 Gram(s) IV Push once  dextrose 50% Injectable 12.5 Gram(s) IV Push once  dextrose 50% Injectable 25 Gram(s) IV Push once  ezetimibe 10 milliGRAM(s) Oral daily  fentaNYL   Infusion. 0.5 MICROgram(s)/kG/Hr (4.17 mL/Hr) IV Continuous <Continuous>  glucagon  Injectable 1 milliGRAM(s) IntraMuscular once  insulin glargine Injectable (LANTUS) 34 Unit(s) SubCutaneous at bedtime  insulin lispro (ADMELOG) corrective regimen sliding scale   SubCutaneous every 6 hours  midodrine 10 milliGRAM(s) Oral every 8 hours  mupirocin 2% Nasal 1 Application(s) Both Nostrils every 12 hours  norepinephrine Infusion 0.05 MICROgram(s)/kG/Min (7.92 mL/Hr) IV Continuous <Continuous>  pantoprazole  Injectable 40 milliGRAM(s) IV Push two times a day  propofol Infusion 5 MICROgram(s)/kG/Min (2.54 mL/Hr) IV Continuous <Continuous>  vancomycin  IVPB 750 milliGRAM(s) IV Intermittent every 24 hours    MEDICATIONS  (PRN):  acetaminophen     Tablet .. 650 milliGRAM(s) Oral every 6 hours PRN Temp greater or equal to 38C (100.4F), Mild Pain (1 - 3)  albuterol/ipratropium for Nebulization 3 milliLiter(s) Nebulizer every 6 hours PRN Shortness of Breath and/or Wheezing  aluminum hydroxide/magnesium hydroxide/simethicone Suspension 30 milliLiter(s) Oral every 4 hours PRN Dyspepsia  dextrose Oral Gel 15 Gram(s) Oral once PRN Blood Glucose LESS THAN 70 milliGRAM(s)/deciliter  guaiFENesin Oral Liquid (Sugar-Free) 200 milliGRAM(s) Oral every 6 hours PRN Cough  melatonin 5 milliGRAM(s) Oral at bedtime PRN Insomnia  ondansetron Injectable 4 milliGRAM(s) IV Push every 8 hours PRN Nausea and/or Vomiting        PRESENT SYMPTOMS: [X ]Unable to obtain due to poor mentation   Source if other than patient:  [ ]Family   [ ]Team   [ X]All review of systems negative including pain and dyspnea unless noted below    All components of pain assessment below addressed. Blank spaces indicate that the patient did/could not complete the assessment.  Pain: [ ]yes [ ]no  QOL impact -   Location -                    Aggravating factors -  Quality -  Radiation -  Timing-  Severity (0-10 scale):  Minimal acceptable level (0-10 scale):     CPOT:    https://www.sccm.org/getattachment/ivn66v03-0n4p-5r3c-1e5h-8795j6677x1o/Critical-Care-Pain-Observation-Tool-(CPOT)    PAIN AD Score: 0  [email or web address].[email or web address]/cog/painad.pdf (press ctrl +  left click to view)    Dyspnea:                           [ ]None[ ]Mild [ ]Moderate [ ]Severe     Respiratory Distress Observation Scale (RDOS): 1  A score of 0 to 2 signifies little or no respiratory distress, 3 signifies mild distress, scores 4 to 6 indicate moderate distress, and scores greater than 7 signify severe distress  [email or web address]    Anxiety:                             [ ]None[ ]Mild [ ]Moderate [ ]Severe   Fatigue:                             [ ]None[ ]Mild [ ]Moderate [ ]Severe   Nausea:                             [ ]None[ ]Mild [ ]Moderate [ ]Severe   Loss of appetite:              [ ]None[ ]Mild [ ]Moderate [ ]Severe   Constipation:                    [ ]None[ ]Mild [ ]Moderate [ ]Severe    Other Symptoms:    PHYSICAL EXAM:  Vital Signs Last 24 Hrs  T(C): 38 (16 Jun 2025 08:34), Max: 38.7 (16 Jun 2025 02:54)  T(F): 100.4 (16 Jun 2025 08:34), Max: 101.7 (16 Jun 2025 02:54)  HR: 79 (16 Jun 2025 08:34) (62 - 100)  BP: 111/55 (16 Jun 2025 08:34) (64/37 - 157/70)  BP(mean): 79 (16 Jun 2025 08:34) (46 - 101)  RR: 21 (16 Jun 2025 08:34) (20 - 40)  SpO2: 100% (16 Jun 2025 08:34) (85% - 100%)    Parameters below as of 16 Jun 2025 08:34  Patient On (Oxygen Delivery Method): ventilator    O2 Concentration (%): 40 I&O's Summary    15 Kleber 2025 07:01  -  16 Jun 2025 07:00  --------------------------------------------------------  IN: 2687 mL / OUT: 1475 mL / NET: 1212 mL    16 Jun 2025 07:01  -  16 Jun 2025 11:29  --------------------------------------------------------  IN: 0 mL / OUT: 395 mL / NET: -395 mL        GENERAL:  [X ] No acute distress [ ]Lethargic  [ ]Unarousable  [ ]Verbal  [ ]Non-Verbal [ ]Cachexia    BEHAVIORAL/PSYCH:  [ ]Alert and Oriented x  [ ] Anxiety [ ] Delirium [ ] Agitation [X ] Calm   EYES: [ ] No scleral icterus [ ] Scleral icterus [ ] Closed  ENMT:  [ ]Dry mouth  [ ]No external oral lesions [ X] No external ear or nose lesions  CARDIOVASCULAR:  [ ]Regular [ ]Irregular [ ]Tachy [ ]Not Tachy  [ ]Aubrey [ ] Edema [ ] No edema  PULMONARY:  [ ]Tachypnea  [ ]Audible excessive secretions [X ] No labored breathing [ ] labored breathing  GASTROINTESTINAL: [ ]Soft  [ ]Distended  [ X]Not distended [ ]Non tender [ ]Tender  MUSCULOSKELETAL: [ ]No clubbing [ ] clubbing  [ X] No cyanosis [ ] cyanosis  NEUROLOGIC: [ ]No focal deficits  [ ]Follows commands  [ ]Does not follow commands  [ ]Cognitive impairment  [ ]Dysphagia  [ ]Dysarthria  [ ]Paresis   SKIN: [ ] Jaundiced [X ] Non-jaundiced [ ]Rash [ ]No Rash [ ] Warm [ ] Dry  MISC/LINES: [ ] ET tube [ ] Trach [ ]NGT/OGT [ ]PEG [ ]Mcmahon    CRITICAL CARE:  [ ] Shock Present  [ ]Septic [ ]Cardiogenic [ ]Neurologic [ ]Hypovolemic  [ ]  Vasopressors [ ]  Inotropes   [ ]Respiratory failure present [ ]Mechanical ventilation [ ]Non-invasive ventilatory support [ ]High flow  [ ]Acute  [ ]Chronic [ ]Hypoxic  [ ]Hypercarbic [ ]Other  [ ]Other organ failure     LABS: reviewed by me, notable for: elevated WBC, SCr; UA WNL                        13.4   33.02 )-----------( 335      ( 16 Jun 2025 06:28 )             44.5   06-16    140  |  102  |  56[H]  ----------------------------<  136[H]  4.7   |  23  |  2.3[H]    Ca    9.4      16 Jun 2025 06:28  Phos  4.6     06-15  Mg     2.8     06-16    TPro  6.5  /  Alb  3.5  /  TBili  0.4  /  DBili  x   /  AST  18  /  ALT  11  /  AlkPhos  100  06-16  PT/INR - ( 15 Kleber 2025 06:45 )   PT: 21.30 sec;   INR: 1.78 ratio             Urinalysis Basic - ( 16 Jun 2025 06:28 )    Color: x / Appearance: x / SG: x / pH: x  Gluc: 136 mg/dL / Ketone: x  / Bili: x / Urobili: x   Blood: x / Protein: x / Nitrite: x   Leuk Esterase: x / RBC: x / WBC x   Sq Epi: x / Non Sq Epi: x / Bacteria: x      RADIOLOGY & ADDITIONAL STUDIES: CXR personally reviewed by me: 6/16: possibly b/l opacities    PROTEIN CALORIE MALNUTRITION PRESENT: [ ]mild [ ]moderate [ ]severe [ ]underweight [ ]morbid obesity  https://www.andeal.org/vault/2440/web/files/ONC/Table_Clinical%20Characteristics%20to%20Document%20Malnutrition-White%20JV%20et%20al%202012.pdf    Height (cm): 160 (06-14-25 @ 08:00), 162.6 (06-03-25 @ 14:33), 160 (05-30-25 @ 11:11)  Weight (kg): 83.3 (06-14-25 @ 08:00), 75.9 (06-03-25 @ 14:33), 75.7 (05-30-25 @ 11:11)  BMI (kg/m2): 32.5 (06-14-25 @ 08:00), 28.7 (06-03-25 @ 14:33), 29.6 (05-30-25 @ 11:11)    [ ]PPSV2 < or = to 30% [ ]significant weight loss  [ ]poor nutritional intake  [ ]anasarca      [ ]Artificial Nutrition          Palliative Care Spiritual/Emotional Screening Tool Question  Severity (0-4):                    OR                    [X ] Unable to determine/NA  Score of 2 or greater indicates recommendation of Chaplaincy referral  Chaplaincy Referral: [ ] Yes [ ] Refused [ ] Following     Caregiver [place]:  [ ] Yes [ ] No    OR    [x ] Unable to determine. Will assess at later time if appropriate.  Social Work Referral [ ]  Patient and Family Centered Care Referral [ ]    Anticipatory Grief Present: [ ] Yes [ ] No    OR     [ x] Unable to determine. Will assess at later time if appropriate.  Social Work Referral [ ]  Patient and Family Centered Care Referral [ ]    REFERRALS:   [ ]Chaplaincy  [ ]Hospice  [ ]Child Life  [ ]Social Work  [ ]Case management [ ]Holistic Therapy     Palliative care education provided to patient and/or family    Goals of Care Document:     ______________  Wolfgang Ramos MD  Palliative Medicine  [facility]   of Geriatric and Palliative Medicine  [phone]

## 2025-06-18 NOTE — PROGRESS NOTE ADULT - ASSESSMENT
70 year old male with past medical history of  COPD (on 2L home O2), DM 2  HFrEF 30%, CAD,  ischemic cardiomyopathy s/p CRT-D, HTN, paroxysmal A-fib (status post ablation 1/14/25) on amiodarone and  Eliquis who presented to ED w c/o LESLIE and b/l  leg swelling.         Impression:  #SOB/LESLIE: Multifactorial  #HFrEF (EF 30%, s/p CRT-D)  #CAD s/p PCI to RCA and LAD  #NSTEMI/ Hx of frequent NSVT   #Paroxysmal AFib on Eliquis      Plan:   Currently intubated, mechanically ventilated, and sedated.  -c/w bumex 1mg BID IVP   - wean off pressors as able  -  monitor end organ perfusion markers closely ( LFT, renal function, Lactate)   - Check BMP daily and monitor renal function. Maintain K >4.0, Mg >2.2    - Strict intake and output, maintain negative balance for now  - For Afib - c/w AC, BB and amiodarone   - Restart home GDMT for HFrEF, when stable/medically appropriate   - Pt needs ischemic eval, but has refused in the past   - appreciate Infectious disease recs   70 year old male with past medical history of  COPD (on 2L home O2), DM 2  HFrEF 30%, CAD,  ischemic cardiomyopathy s/p CRT-D, HTN, paroxysmal A-fib (status post ablation 1/14/25) on amiodarone and  Eliquis who presented to ED w c/o LESLIE and b/l  leg swelling.         Impression:  #SOB/LESLIE: Multifactorial  #HFrEF (EF 30%, s/p CRT-D)  #CAD s/p PCI to RCA and LAD  #NSTEMI/ Hx of frequent NSVT   #Paroxysmal AFib on Eliquis      Plan:   Currently intubated, mechanically ventilated, and sedated.  -c/w bumex 1mg BID IVP   - wean off pressors as able  -  monitor end organ perfusion markers closely ( LFT, renal function, Lactate)   - Check BMP daily and monitor renal function. Maintain K >4.0, Mg >2.2    - Strict intake and output, maintain negative balance for now  - For Afib - c/w AC, BB and amiodarone   - Restart home GDMT for HFrEF, when stable/medically appropriate   - Pt needs ischemic eval, but has refused in the past

## 2025-06-19 NOTE — PROGRESS NOTE ADULT - SUBJECTIVE AND OBJECTIVE BOX
TOSINJAMEEMONIQUE CHRISTINE  70y, Male    LOS  5d    INTERVAL EVENTS/HPI  - T(F): , Max: 100.8 (06-19-25 @ 06:00)  - WBC Count: 14.84 (06-19-25 @ 06:27)  WBC Count: 18.93 (06-18-25 @ 19:42)  - Creatinine: 1.5 (06-19-25 @ 06:27)  Creatinine: 1.4 (06-18-25 @ 13:40)    REVIEW OF SYSTEMS: cannot obtain further history from the patient secondary to altered mental status or sedation    Prior hospital charts reviewed [Yes]  Primary team notes reviewed [Yes]  Other consultant notes reviewed [Yes]    ANTIMICROBIALS:   cefepime   IVPB 2000 every 12 hours  vancomycin  IVPB 750 every 24 hours      OTHER MEDS: MEDICATIONS  (STANDING):  acetaminophen     Tablet .. 650 every 6 hours PRN  albuterol/ipratropium for Nebulization 3 every 6 hours PRN  aluminum hydroxide/magnesium hydroxide/simethicone Suspension 30 every 4 hours PRN  aMIOdarone    Tablet 200 daily  apixaban 5 every 12 hours  atorvastatin 40 at bedtime  bumetanide Injectable 1 two times a day  clopidogrel Tablet 75 daily  dexMEDEtomidine Infusion 0.05 <Continuous>  dextrose 50% Injectable 25 once  dextrose 50% Injectable 12.5 once  dextrose 50% Injectable 25 once  dextrose Oral Gel 15 once PRN  ezetimibe 10 daily  fentaNYL   Infusion. 0.5 <Continuous>  glucagon  Injectable 1 once  guaiFENesin Oral Liquid (Sugar-Free) 200 every 6 hours PRN  insulin glargine Injectable (LANTUS) 34 at bedtime  insulin lispro (ADMELOG) corrective regimen sliding scale  every 6 hours  melatonin 5 at bedtime PRN  midodrine 10 every 8 hours  norepinephrine Infusion 0.05 <Continuous>  ondansetron Injectable 4 every 8 hours PRN  pantoprazole  Injectable 40 two times a day  propofol Infusion 5 <Continuous>      Vital Signs Last 24 Hrs  T(F): 100.8 (06-19-25 @ 07:06), Max: 102.9 (06-16-25 @ 23:01)    Vital Signs Last 24 Hrs  HR: 69 (06-19-25 @ 07:00) (62 - 100)  BP: 132/60 (06-19-25 @ 07:00) (88/54 - 221/96)  RR: 16 (06-19-25 @ 07:06)  SpO2: 100% (06-19-25 @ 07:00) (91% - 100%)  Wt(kg): --    EXAM:  GENERAL: On MV  HEAD: No head lesions  NECK: Supple  CHEST/LUNG: Shallow breath sounds   HEART: S1 S2  ABDOMEN: Soft, nontender +Mcmahon  EXTREMITIES: No clubbing  MSK: +1 edema   SKIN: No rashes or lesions, no superficial thrombophlebitis      Labs:                        10.0   14.84 )-----------( 212      ( 19 Jun 2025 06:27 )             32.9     06-19    142  |  107  |  62[HH]  ----------------------------<  331[H]  4.8   |  25  |  1.5    Ca    8.7      19 Jun 2025 06:27  Mg     2.3     06-19    TPro  5.1[L]  /  Alb  2.7[L]  /  TBili  0.3  /  DBili  x   /  AST  12  /  ALT  8   /  AlkPhos  61  06-19      WBC Trend:  WBC Count: 14.84 (06-19-25 @ 06:27)  WBC Count: 18.93 (06-18-25 @ 19:42)  WBC Count: 21.81 (06-18-25 @ 13:40)  WBC Count: 23.50 (06-17-25 @ 19:33)      Creatine Trend:  Creatinine: 1.5 (06-19)  Creatinine: 1.4 (06-18)  Creatinine: 1.9 (06-17)  Creatinine: 2.3 (06-16)      Liver Biochemical Testing Trend:  Alanine Aminotransferase (ALT/SGPT): 8 (06-19)  Alanine Aminotransferase (ALT/SGPT): 9 (06-18)  Alanine Aminotransferase (ALT/SGPT): 7 (06-17)  Alanine Aminotransferase (ALT/SGPT): 11 (06-16)  Alanine Aminotransferase (ALT/SGPT): 8 (06-15)  Aspartate Aminotransferase (AST/SGOT): 12 (06-19-25 @ 06:27)  Aspartate Aminotransferase (AST/SGOT): 22 (06-18-25 @ 13:40)  Aspartate Aminotransferase (AST/SGOT): 12 (06-17-25 @ 06:06)  Aspartate Aminotransferase (AST/SGOT): 18 (06-16-25 @ 06:28)  Aspartate Aminotransferase (AST/SGOT): 12 (06-15-25 @ 06:45)  Bilirubin Total: 0.3 (06-19)  Bilirubin Total: 0.6 (06-18)  Bilirubin Total: 0.2 (06-17)  Bilirubin Total: 0.4 (06-16)  Bilirubin Total: 0.4 (06-15)      Trend LDH  06-15-25 @ 06:45  375[H]      Urinalysis Basic - ( 19 Jun 2025 06:27 )    Color: x / Appearance: x / SG: x / pH: x  Gluc: 331 mg/dL / Ketone: x  / Bili: x / Urobili: x   Blood: x / Protein: x / Nitrite: x   Leuk Esterase: x / RBC: x / WBC x   Sq Epi: x / Non Sq Epi: x / Bacteria: x        MICROBIOLOGY:  Vancomycin Level, Trough: 7.0 (06-18 @ 18:00)  Vancomycin Level, Trough: 8.1 (06-17 @ 11:01)  Vancomycin Level, Random: 14.5 (06-15 @ 06:45)    Male    Culture - Blood (collected 16 Jun 2025 06:28)  Source: Blood Blood  Preliminary Report:    No growth at 48 Hours    Culture - Sputum (collected 15 Kleber 2025 16:44)  Source: Sputum Sputum  Final Report:    Commensal magda consistent with body site    Culture - Blood (collected 14 Jun 2025 15:36)  Source: Blood None  Preliminary Report:    No growth at 4 days    Culture - Blood (collected 02 Jun 2025 18:05)  Source: Blood Blood  Final Report:    No growth at 5 days    Culture - Blood (collected 02 Jun 2025 18:05)  Source: Blood Blood  Final Report:    No growth at 5 days    Culture - Blood (collected 18 Apr 2025 22:18)  Source: Blood Blood  Final Report:    No growth at 5 days    Culture - Blood (collected 18 Apr 2025 22:17)  Source: Blood Blood  Final Report:    No growth at 5 days    Culture - Blood (collected 28 Mar 2025 20:45)  Source: Blood Blood-Peripheral  Final Report:    No growth at 5 days    Culture - Blood (collected 31 Dec 2024 15:45)  Source: .Blood BLOOD  Final Report:    No growth at 5 days    Culture - Blood (collected 03 Sep 2023 12:50)  Source: .Blood Blood  Final Report:    No growth at 5 days      HIV-1/2 Combo Result: Nonreact (06-16-25 @ 06:28)  HIV-1/2 Combo Result: Nonreact (01-04-25 @ 05:58)                    Fungitell: 108 pg/mL (06-16 @ 06:28)    COVID-19 PCR: NotDetec (05-15-25 @ 09:50)      Procalcitonin: 1.49 (06-14)          Lactate Dehydrogenase, Serum: 375 (06-15)      Troponin T, High Sensitivity Result: 121 (06-15)  Troponin T, High Sensitivity Result: 141 (06-14)  Troponin T, High Sensitivity Result: 134 (06-14)  Troponin T, High Sensitivity Result: 137 (06-14)  Troponin T, High Sensitivity Result: 121 (06-14)  Troponin T, High Sensitivity Result: 113 (06-14)    Blood Gas Arterial, Lactate: 0.6 (06-19 @ 05:50)  Blood Gas Arterial, Lactate: 0.6 (06-18 @ 10:27)  Blood Gas Arterial, Lactate: 0.7 (06-18 @ 04:34)  Blood Gas Arterial, Lactate: 0.6 (06-17 @ 23:23)        RADIOLOGY & ADDITIONAL TESTS:  I have personally reviewed the relevant images.   CXR      CT    < from: Xray Chest 1 View-PORTABLE IMMEDIATE (Xray Chest 1 View-PORTABLE IMMEDIATE .) (06.18.25 @ 09:19) >    IMPRESSION:    Stable bilateral lung opacities.    Endotracheal tube and nasogastric tubes are obscured    --- End of Report ---    < end of copied text >      WEIGHT  Weight (kg): 83.3 (06-14-25 @ 08:00)  Creatinine: 1.5 mg/dL (06-19-25 @ 06:27)  Creatinine: 1.4 mg/dL (06-18-25 @ 13:40)      All available historical records have been reviewed

## 2025-06-19 NOTE — PROGRESS NOTE ADULT - ASSESSMENT
This is a 70 year old male with past medical history of  COPD (on 2L home O2), DM 2  HFrEF 30%, CAD,  ischemic cardiomyopathy s/p CRT-D, HTN, paroxysmal A-fib (status post ablation 1/14/25) on amiodarone and  Eliquis who presented to ED w c/o LESLIE and b/l  leg swelling.    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  #Obesity BMI (kg/m2): 32.5, 28.7, 29.6, 30.1    Recently Enterovirus Positive Discharged on PO steroids Vantin+Doxy    Covid/Flu/RSV negative  MRSA nares positive  , Fungitell 108 (very mildly elevated)   Blood cultures NGTD  Sputum cultures Normal Respiratory magda  C.diff negative     RECOMMENDATIONS  -Unclear cause of fevers- Possible drug induced vs GI bleeding?  -Cardiology follow up. Diuresis.  -CT abdomen?pelvis as per GI. Can also obtain CT chest as well in the same setting.  -IV Vanc. Dosing as per pharmacy.  -IV Cefepime 2 gram q 12 hours. (S/D 6/14)  -Off loading to prevent pressure sores and preventive measures to avoid aspiration. TOV. GOC. Recurrent admissions expected.     If any questions, please send a message or call on WhatsApp Teams  Please continue to update ID with any pertinent new laboratory or radiographic findings.    Benigno Pond M.D  Infectious Diseases Attending/   [employer] at [facility]

## 2025-06-19 NOTE — PROGRESS NOTE ADULT - ASSESSMENT
70 year old male with past medical history of  COPD (on 2L home O2), DM 2  HFrEF 30%, CAD,  ischemic cardiomyopathy s/p CRT-D, HTN, paroxysmal A-fib (status post ablation 1/14/25) on amiodarone and  Eliquis who presented to ED w c/o LESLIE and b/l  leg swelling.     Impression:  #SOB/LESLIE: Multifactorial  #HFrEF (EF 30%, s/p CRT-D)  #CAD s/p PCI to RCA and LAD  #NSTEMI/ Hx of frequent NSVT   #Paroxysmal AFib on Eliquis      Plan:   Currently intubated, mechanically ventilated, and sedated.  -c/w gentle IV diuresis given possible infection : bumex 1mg BID IVP   - patient with new fever: ID following   - wean off pressors as able  -  monitor end organ perfusion markers closely ( LFT, renal function, Lactate)   - Check BMP daily and monitor renal function. Maintain K >4.0, Mg >2.2    - Strict intake and output, maintain negative balance for now  - Restart home GDMT for HFrEF, when stable/medically appropriate   - Pt needs ischemic eval, but has refused in the past   - ongoing GOC discussion

## 2025-06-19 NOTE — PROGRESS NOTE ADULT - SUBJECTIVE AND OBJECTIVE BOX
Patient seen and evaluated this am, sedated on vent with Precedex and fentanyl      T(F): 100.6 (06-19-25 @ 11:01), Max: 100.8 (06-19-25 @ 06:00)  HR: 100 (06-19-25 @ 14:00)  BP: 167/72 (06-19-25 @ 14:00)  RR: 29 (06-19-25 @ 14:00)  SpO2: 99% (06-19-25 @ 14:00) (99% - 100%)    PHYSICAL EXAM:  GENERAL: NAD  HEAD:  Atraumatic, Normocephalic  EYES: EOMI, PERRLA, conjunctiva and sclera clear  NERVOUS SYSTEM: no focal deficits   CHEST/LUNG:  bilateral rhonchi  HEART: Regular rate and rhythm; No murmurs, rubs, or gallops  ABDOMEN: Soft, Nontender, Nondistended; Bowel sounds present  EXTREMITIES:  2+ Peripheral Pulses, No clubbing, cyanosis, or edema    LABS  06-19    142  |  107  |  62[HH]  ----------------------------<  331[H]  4.8   |  25  |  1.5    Ca    8.7      19 Jun 2025 06:27  Mg     2.3     06-19    TPro  5.1[L]  /  Alb  2.7[L]  /  TBili  0.3  /  DBili  x   /  AST  12  /  ALT  8   /  AlkPhos  61  06-19                          10.0   14.84 )-----------( 212      ( 19 Jun 2025 06:27 )             32.9     PT/INR - ( 18 Jun 2025 13:40 )   PT: 19.00 sec;   INR: 1.59 ratio         PTT - ( 18 Jun 2025 13:40 )  PTT:29.0 sec    Mode: AC/ CMV (Assist Control/ Continuous Mandatory Ventilation)  RR (machine): 20  TV (machine): 400  FiO2: 40  PEEP: 8    Culture Results:   No growth at 48 Hours (06-16-25)  Culture Results:   Commensal magda consistent with body site (06-15-25)  Culture Results:   No growth at 4 days (06-14-25)  Culture Results:   No growth at 5 days (06-02-25)  Culture Results:   No growth at 5 days (06-02-25)    RADIOLOGY  < from: Xray Chest 1 View- PORTABLE-Routine (Xray Chest 1 View- PORTABLE-Routine in AM.) (06.19.25 @ 07:14) >  IMPRESSION:    Bilateral lung opacities which appears to have mildly progressed from   prior study.    < end of copied text >    MEDICATIONS  (STANDING):  aMIOdarone    Tablet 200 milliGRAM(s) Oral daily  apixaban 5 milliGRAM(s) Enteral Tube every 12 hours  atorvastatin 40 milliGRAM(s) Oral at bedtime  bumetanide Injectable 1 milliGRAM(s) IV Push two times a day  cefepime   IVPB 2000 milliGRAM(s) IV Intermittent every 12 hours  chlorhexidine 0.12% Liquid 15 milliLiter(s) Oral Mucosa every 12 hours  chlorhexidine 2% Cloths 1 Application(s) Topical <User Schedule>  clopidogrel Tablet 75 milliGRAM(s) Enteral Tube daily  dexMEDEtomidine Infusion 0.05 MICROgram(s)/kG/Hr (1.04 mL/Hr) IV Continuous <Continuous>  ezetimibe 10 milliGRAM(s) Oral daily  fentaNYL   Infusion. 0.5 MICROgram(s)/kG/Hr (4.17 mL/Hr) IV Continuous <Continuous>  insulin glargine Injectable (LANTUS) 34 Unit(s) SubCutaneous at bedtime  insulin lispro (ADMELOG) corrective regimen sliding scale   SubCutaneous every 6 hours  midodrine 10 milliGRAM(s) Oral every 8 hours  mupirocin 2% Nasal 1 Application(s) Both Nostrils every 12 hours  norepinephrine Infusion 0.05 MICROgram(s)/kG/Min (7.92 mL/Hr) IV Continuous <Continuous>  pantoprazole  Injectable 40 milliGRAM(s) IV Push two times a day  propofol Infusion 5 MICROgram(s)/kG/Min (2.54 mL/Hr) IV Continuous <Continuous>  vancomycin  IVPB 750 milliGRAM(s) IV Intermittent every 24 hours    MEDICATIONS  (PRN):  acetaminophen     Tablet .. 650 milliGRAM(s) Oral every 6 hours PRN Temp greater or equal to 38C (100.4F), Mild Pain (1 - 3)  albuterol/ipratropium for Nebulization 3 milliLiter(s) Nebulizer every 6 hours PRN Shortness of Breath and/or Wheezing  aluminum hydroxide/magnesium hydroxide/simethicone Suspension 30 milliLiter(s) Oral every 4 hours PRN Dyspepsia  dextrose Oral Gel 15 Gram(s) Oral once PRN Blood Glucose LESS THAN 70 milliGRAM(s)/deciliter  guaiFENesin Oral Liquid (Sugar-Free) 200 milliGRAM(s) Oral every 6 hours PRN Cough  melatonin 5 milliGRAM(s) Oral at bedtime PRN Insomnia  ondansetron Injectable 4 milliGRAM(s) IV Push every 8 hours PRN Nausea and/or Vomiting        Pending/Follow up items (tests, labs, procedures,consults):    Indication for continued inpatient management:    Goals of Care note:     Family contact:  Dispo (home, SNF, etc):    Prepare for DC order [x] - updated MARLENY is:   DC provider note prep:    -------------------------------Time spent-----------------------------------------------------------------------  Initial visit:             mins [ ] -- 55-74 mins [ ]  Subsequent visit: 50-79 mins[ ]    -- 35-49mins [ ]     --------------------------------# and complexity of problems---------------------------------------------  [ ] chronic illness with severe exacerbation , progression, treatment side effect  [ ] acute or chronic illness with threat to life or bodily funtion                         --------------------------------Complexity of data reviewed ----------------------------------------------  The Patients complexity of data reviewed  is Low [ ] Moderate [ ] High [ ] due to the following:   A:      Reviewed prior external records [ ]      Considering/ Ordered a unique test:  Labs [x ] Imaging [x ] Stress Test  [ ] Other: Specify [ ]      Reviewed each unique test result [x ]      Assessment requiring an independent historian [ ]  B:       Independent interpretation of:   X-Ray [x ] EKG [ ]  Other: Specify  [ ]  C:       Discussion of management of tests with clinician outside of my group [ ]    -------------------------------------Risk of Morbidity-------------------------------------------------------  The Patients risk of morbidity is Low [ ] Moderate [ ] High [ ] due to the following:   Mod:  Prescription Drug Management [ ]  Decision regarding elective or emergent: minor surgery [ ] major surgery [ ]  Decision regarding hospitalization or escalation of hospital-level care [ ]  SDOH: Hearing/Vision impaired [ ] Food insecurity [ ] Homelessness/unsafe condition [ ]   Financial strain [ ] un/underemployed [ ] Lack of Transport [ ]  High:  DNR or De-Escalation of care because of poor prognosis [ ]  Drug Therapy requiring intensive monitoring for toxicity [ ]  Parenteral controlled substance [ ]

## 2025-06-19 NOTE — PROGRESS NOTE ADULT - ASSESSMENT
70M with PMH of COPD, DM2, HFrEF, CAD, ICM s/p CRT-D, HTN, pAF< eliquis here with LESLIE and bilateral leg swelling, and found to have acute HFrEF and pulmonary edema, on vent, nebs, steroids, and bumex. Will need ischemic eval eventually. Patient was DNR/DNI/trial NIV on recent admission after d/w palliative, but wife opted this admission for intubation. Palliative c/s for GOC.    - see GOC 6/18/25, d/w jeny Duque (HCP) and Jae, full measures for now  - c/w fentanyl gtt  - c/w IV abx for possible PNA, including IV vanco, monitor levels  - c/w bumex IV, monitor I/Os  - no acute symptoms  - will sign off, goals clear for now; please reconsult as needed for symptoms or GOC    ______________  Wolfgang Ramos MD  Palliative Medicine  [facility]  [phone]

## 2025-06-19 NOTE — PROGRESS NOTE ADULT - ASSESSMENT
70 year old male with media l history of CKD3,  COPD on 2L home O2, diabetes, chronic  HFrEF  s/p CRT-D, AVR, hypertension, paroxysmal A-fib (status post ablation 1/14/25)  CAD admitted  for dyspnea on exertion and leg swelling.     acute respiratory failure / sepsis  / aspiration pneumonia     - febrile    - c-diff is negative   - titrate pressors to maintain MAP>65   - continue IV antibiotics as per ID   - Bumex as per cardiology   - I discussed plan with CC team   - 50 minutes total spent today on patient care .

## 2025-06-19 NOTE — PROGRESS NOTE ADULT - SUBJECTIVE AND OBJECTIVE BOX
Chief complaint: Patient is a 70y old  Male who presents with a chief complaint of SOB (18 Jun 2025 09:13)    Interval history:  Patient remains intubated and sedative.     Past medical history is notable for:  Diabetes  CAD (coronary artery disease) Stented coronary artery  Chronic obstructive pulmonary disease (COPD)  HTN (hypertension)  Dyslipidemia  GERD (gastroesophageal reflux disease)  Afib  CVA (cerebral vascular accident)  Acute on chronic HFrEF (heart failure with reduced ejection fraction)  Acute respiratory failure with hypoxia        Review of systems: a complete 10- point review of systems was obtained and is negative except as stated in the interval history.    Vitals:  ICU Vital Signs Last 24 Hrs  T(C): 38.1 (19 Jun 2025 11:01), Max: 38.2 (19 Jun 2025 06:00)  T(F): 100.6 (19 Jun 2025 11:01), Max: 100.8 (19 Jun 2025 06:00)  HR: 72 (19 Jun 2025 09:19) (62 - 100)  BP: 132/60 (19 Jun 2025 07:00) (92/55 - 183/77)  BP(mean): 86 (19 Jun 2025 07:00) (71 - 113)  ABP: --  ABP(mean): --  RR: 21 (19 Jun 2025 11:01) (9 - 32)  SpO2: 100% (19 Jun 2025 09:19) (91% - 100%)    O2 Parameters below as of 19 Jun 2025 07:00  Patient On (Oxygen Delivery Method): ventilator      I&O's Summary    18 Jun 2025 07:01  -  19 Jun 2025 07:00  --------------------------------------------------------  IN: 3067.1 mL / OUT: 2875 mL / NET: 192.1 mL    19 Jun 2025 07:01  -  19 Jun 2025 13:04  --------------------------------------------------------  IN: 0 mL / OUT: 800 mL / NET: -800 mL        Weight trend:  Weight (kg): 83.3 (06-14)    Physical exam:  General: intubated and sedated   Cardiac: Regular rate and rhythm. No murmurs, rubs, or gallops.   Vascular: Symmetric radial pulses. Dorsalis pedis pulses palpable.   Respiratory: intubated   Abdomen: Soft, nontender. Audible bowel sounds.   Extremities: Warm with trace  edema. No cyanosis or clubbing.       Data reviewed:  - Telemetry:  - ECG < from: 12 Lead ECG (06.14.25 @ 04:54) >    Ventricular Rate 115 BPM    Atrial Rate 115 BPM    P-R Interval 112 ms    QRS Duration 138 ms    Q-T Interval 364 ms    QTC Calculation(Bazett) 503 ms    P Axis 31 degrees    R Axis 244 degrees    T Axis 53 degrees    Diagnosis Line Atrial-sensed ventricular-paced rhythm  Biventricular pacemaker detected  Abnormal ECG    < end of copied text >    - Echo (< from: TTE Echo Complete w/o Contrast w/ Doppler (06.16.25 @ 10:37) >    CONCLUSIONS:     1. Left ventricular systolic function is moderately decreased with an   ejection fraction of 31 % by Beck's method of disks.   2. Severe left ventricular hypertrophy.   3. There is severe (grade 3) left ventricular diastolic dysfunction.   4. Normal right ventricular cavity size, with normal wall thickness, and   normal right ventricular systolic function. Tricuspid annular plane   systolic excursion (TAPSE) is 1.5 cm (normal >=1.7 cm).   5. Device lead is visualized in the right heart.   6. Left atrium is moderately dilated.   7. A bioprosthetic valve replacement valve replacement is present in the   aortic position The prosthetic valve is well seated. Trace intravalvular   regurgitation.   8. Trace mitral regurgitation at a blood pressure of 119/75 mmHg.   9. Mild tricuspid regurgitation.  10. Estimated pulmonary artery systolic pressure is 44 mmHg, consistent   with mild pulmonary hypertension.  11. No pericardial effusion seen.    < end of copied text >    - Radiology:  < from: Xray Chest 1 View- PORTABLE-Routine (Xray Chest 1 View- PORTABLE-Routine in AM.) (06.17.25 @ 07:21) >  MPRESSION:    Mild improvement in right basilar opacity. Stable left lung opacities.    --- End of Report ---    < end of copied text >    - Labs:                        10.0   14.84 )-----------( 212      ( 19 Jun 2025 06:27 )             32.9     06-19    142  |  107  |  62[HH]  ----------------------------<  331[H]  4.8   |  25  |  1.5    Ca    8.7      19 Jun 2025 06:27  Mg     2.3     06-19    TPro  5.1[L]  /  Alb  2.7[L]  /  TBili  0.3  /  DBili  x   /  AST  12  /  ALT  8   /  AlkPhos  61  06-19    LIVER FUNCTIONS - ( 19 Jun 2025 06:27 )  Alb: 2.7 g/dL / Pro: 5.1 g/dL / ALK PHOS: 61 U/L / ALT: 8 U/L / AST: 12 U/L / GGT: x           PT/INR - ( 18 Jun 2025 13:40 )   PT: 19.00 sec;   INR: 1.59 ratio         PTT - ( 18 Jun 2025 13:40 )  PTT:29.0 sec      ABG - ( 19 Jun 2025 05:50 )  pH, Arterial: 7.36  pH, Blood: x     /  pCO2: 49    /  pO2: 148   / HCO3: 28    / Base Excess: 1.7   /  SaO2: 99.7        Lactate Trend  06-15 @ 06:45 Lactate:1.6     Urinalysis Basic - ( 19 Jun 2025 06:27 )    Color: x / Appearance: x / SG: x / pH: x  Gluc: 331 mg/dL / Ketone: x  / Bili: x / Urobili: x   Blood: x / Protein: x / Nitrite: x   Leuk Esterase: x / RBC: x / WBC x   Sq Epi: x / Non Sq Epi: x / Bacteria: x      - Cultures:    Culture - Blood (collected 06-16)  Source: Blood Blood  Preliminary Report:    No growth at 24 hours    Culture - Sputum (collected 06-15)  Source: Sputum Sputum  Gram Stain:    Rare polymorphonuclear leukocytes per low power field    No Squamous epithelial cells per low power field    Rare Yeast like cells seen per oil power field    Rare Gram Positive Cocci in Clusters seen per oil power field  Final Report:    Commensal magda consistent with body site    Culture - Blood (collected 06-14)  Source: Blood None  Preliminary Report:    No growth at 72 Hours      Medications:  aMIOdarone    Tablet 200 milliGRAM(s) Oral daily  apixaban 5 milliGRAM(s) Enteral Tube every 12 hours  atorvastatin 40 milliGRAM(s) Oral at bedtime  bumetanide Injectable 1 milliGRAM(s) IV Push two times a day  cefepime   IVPB 2000 milliGRAM(s) IV Intermittent every 12 hours  chlorhexidine 0.12% Liquid 15 milliLiter(s) Oral Mucosa every 12 hours  chlorhexidine 2% Cloths 1 Application(s) Topical <User Schedule>  clopidogrel Tablet 75 milliGRAM(s) Enteral Tube daily  dextrose 50% Injectable 25 Gram(s) IV Push once  dextrose 50% Injectable 12.5 Gram(s) IV Push once  dextrose 50% Injectable 25 Gram(s) IV Push once  ezetimibe 10 milliGRAM(s) Oral daily  glucagon  Injectable 1 milliGRAM(s) IntraMuscular once  insulin glargine Injectable (LANTUS) 34 Unit(s) SubCutaneous at bedtime  insulin lispro (ADMELOG) corrective regimen sliding scale   SubCutaneous every 6 hours  midodrine 10 milliGRAM(s) Oral every 8 hours  mupirocin 2% Nasal 1 Application(s) Both Nostrils every 12 hours  pantoprazole  Injectable 40 milliGRAM(s) IV Push two times a day  vancomycin  IVPB 750 milliGRAM(s) IV Intermittent every 24 hours    Drips:  dexMEDEtomidine Infusion 0.05 MICROgram(s)/kG/Hr (1.04 mL/Hr) IV Continuous <Continuous>  dextrose 5%. 1000 milliLiter(s) (100 mL/Hr) IV Continuous <Continuous>  dextrose 5%. 1000 milliLiter(s) (50 mL/Hr) IV Continuous <Continuous>  norepinephrine Infusion 0.05 MICROgram(s)/kG/Min (7.92 mL/Hr) IV Continuous <Continuous>  propofol Infusion 5 MICROgram(s)/kG/Min (2.54 mL/Hr) IV Continuous <Continuous>    PRN:

## 2025-06-19 NOTE — PROGRESS NOTE ADULT - NS ATTEND AMEND GEN_ALL_CORE FT
Patient seen and examined.  Agree with above.   Volume status improved but remains slightly volume overloaded  Continue with iv diuresis to keep O > I   Lifelong ac if no contraindications and ok with GI   Vent management per ICU team     Armando Herron MD

## 2025-06-19 NOTE — PROGRESS NOTE ADULT - ASSESSMENT
70yMale with past medical history of  COPD (on 2L home O2), DM 2  HFrEF 30%, CAD,  ischemic cardiomyopathy s/p CRT-D, HTN, paroxysmal A-fib (status post ablation 1/14/25) on amiodarone and  Eliquis who presented  for b/l leg swelling patient admitted with acute respiratory failure, sepsis, possible aspiration pneumonia and new diarrhea. Patient with digni-shield in.  No hematemesis, melena, blood in stool reported, patient had positive fecal occult blood test.     #Acute Diarrhea  #anemia no gross GI bleeding  #fecal occult blood test positive  #leukocytosis  Rec  -check GI PCR if diarrhea recurs   -C-diff WNL  -can resume feeds as tolerated  -can continue AC if needed  -consider CT scan abdomen and pelvis with IV contrast when patient and creatinine stable for it  -Maintain Hemodynamic Stability   -Monitor CBC  -CMP,Optimize Electrolytes  -PT,PTT,INR  -EKG, Chest-Xray   -Transfuse prn to hgb >8  -Two large bore IV lines  -PPI BID  -Monitor Vital Signs  -Monitor Stool For blood, frequency, consistency, melena  -Active Type and Screen  -Iron Studies, Folate, Vitamin B12 levels  - Follow up with our [facility] located at [address] [place] [address]. Phone Number: [phone] for outpatient EGD/Colonoscopy

## 2025-06-19 NOTE — PROGRESS NOTE ADULT - SUBJECTIVE AND OBJECTIVE BOX
MONIQUE LYONS 70y Male  MRN#: [id number]   Hospital Day: 5d    HPI:   Patient is a  70 year old male with past medical history of  COPD (on 2L home O2), DM 2  HFrEF 30%, CAD,  ischemic cardiomyopathy s/p CRT-D, HTN, paroxysmal A-fib (status post ablation 1/14/25) on amiodarone and  Eliquis who presented to ED w c/o LESLIE and b/l  leg swelling.  Patient said it started 2 days ago and felt like his previous heart failure exacerbations. Patient denied n/v/f/c; denied HA lightheadedness; denied palpitations cough CP (now resolved on admission).  (14 Jun 2025 02:21)      SUBJECTIVE  Patient is a 70y old Male who presents with a chief complaint of SOB (18 Jun 2025 14:33)  Currently admitted to medicine with the primary diagnosis of CHF exacerbation      INTERVAL HPI AND OVERNIGHT EVENTS:  Patient was examined and seen at bedside.     OBJECTIVE  PAST MEDICAL & SURGICAL HISTORY  CHF (congestive heart failure)    Diabetes    CAD (coronary artery disease)    Chronic obstructive pulmonary disease (COPD)    HTN (hypertension)    Stented coronary artery    Dyslipidemia    GERD (gastroesophageal reflux disease)    Afib    CVA (cerebral vascular accident)    H/O heart artery stent    Heart valve replaced  aortic    History of Nissen fundoplication      ALLERGIES:  Bactrim (Unknown)  sulfa drugs (Unknown)    MEDICATIONS:  STANDING MEDICATIONS  aMIOdarone    Tablet 200 milliGRAM(s) Oral daily  apixaban 5 milliGRAM(s) Enteral Tube every 12 hours  atorvastatin 40 milliGRAM(s) Oral at bedtime  bumetanide Injectable 1 milliGRAM(s) IV Push two times a day  cefepime   IVPB 2000 milliGRAM(s) IV Intermittent every 12 hours  chlorhexidine 0.12% Liquid 15 milliLiter(s) Oral Mucosa every 12 hours  chlorhexidine 2% Cloths 1 Application(s) Topical <User Schedule>  clopidogrel Tablet 75 milliGRAM(s) Enteral Tube daily  dexMEDEtomidine Infusion 0.05 MICROgram(s)/kG/Hr IV Continuous <Continuous>  dextrose 5%. 1000 milliLiter(s) IV Continuous <Continuous>  dextrose 5%. 1000 milliLiter(s) IV Continuous <Continuous>  dextrose 50% Injectable 25 Gram(s) IV Push once  dextrose 50% Injectable 12.5 Gram(s) IV Push once  dextrose 50% Injectable 25 Gram(s) IV Push once  ezetimibe 10 milliGRAM(s) Oral daily  fentaNYL   Infusion. 0.5 MICROgram(s)/kG/Hr IV Continuous <Continuous>  glucagon  Injectable 1 milliGRAM(s) IntraMuscular once  insulin glargine Injectable (LANTUS) 34 Unit(s) SubCutaneous at bedtime  insulin lispro (ADMELOG) corrective regimen sliding scale   SubCutaneous every 6 hours  midodrine 10 milliGRAM(s) Oral every 8 hours  mupirocin 2% Nasal 1 Application(s) Both Nostrils every 12 hours  norepinephrine Infusion 0.05 MICROgram(s)/kG/Min IV Continuous <Continuous>  pantoprazole  Injectable 40 milliGRAM(s) IV Push two times a day  propofol Infusion 5 MICROgram(s)/kG/Min IV Continuous <Continuous>  vancomycin  IVPB 750 milliGRAM(s) IV Intermittent every 24 hours    PRN MEDICATIONS  acetaminophen     Tablet .. 650 milliGRAM(s) Oral every 6 hours PRN  albuterol/ipratropium for Nebulization 3 milliLiter(s) Nebulizer every 6 hours PRN  aluminum hydroxide/magnesium hydroxide/simethicone Suspension 30 milliLiter(s) Oral every 4 hours PRN  dextrose Oral Gel 15 Gram(s) Oral once PRN  guaiFENesin Oral Liquid (Sugar-Free) 200 milliGRAM(s) Oral every 6 hours PRN  melatonin 5 milliGRAM(s) Oral at bedtime PRN  ondansetron Injectable 4 milliGRAM(s) IV Push every 8 hours PRN      VITAL SIGNS: Last 24 Hours  T(C): 38.2 (19 Jun 2025 07:06), Max: 38.2 (19 Jun 2025 06:00)  T(F): 100.8 (19 Jun 2025 07:06), Max: 100.8 (19 Jun 2025 06:00)  HR: 69 (19 Jun 2025 07:00) (62 - 100)  BP: 132/60 (19 Jun 2025 07:00) (88/54 - 221/96)  BP(mean): 86 (19 Jun 2025 07:00) (66 - 138)  RR: 16 (19 Jun 2025 07:06) (9 - 40)  SpO2: 100% (19 Jun 2025 07:00) (91% - 100%)    LABS:                        10.0   14.84 )-----------( 212      ( 19 Jun 2025 06:27 )             32.9     06-19    142  |  107  |  62[HH]  ----------------------------<  331[H]  4.8   |  25  |  1.5    Ca    8.7      19 Jun 2025 06:27  Mg     2.3     06-19    TPro  5.1[L]  /  Alb  2.7[L]  /  TBili  0.3  /  DBili  x   /  AST  12  /  ALT  8   /  AlkPhos  61  06-19    PT/INR - ( 18 Jun 2025 13:40 )   PT: 19.00 sec;   INR: 1.59 ratio         PTT - ( 18 Jun 2025 13:40 )  PTT:29.0 sec  Urinalysis Basic - ( 19 Jun 2025 06:27 )    Color: x / Appearance: x / SG: x / pH: x  Gluc: 331 mg/dL / Ketone: x  / Bili: x / Urobili: x   Blood: x / Protein: x / Nitrite: x   Leuk Esterase: x / RBC: x / WBC x   Sq Epi: x / Non Sq Epi: x / Bacteria: x      ABG - ( 19 Jun 2025 05:50 )  pH, Arterial: 7.36  pH, Blood: x     /  pCO2: 49    /  pO2: 148   / HCO3: 28    / Base Excess: 1.7   /  SaO2: 99.7        ASSESSMENT & PLAN  70 year old male with past medical history of  COPD (on 2L home O2), DM 2  HFrEF 30%, CAD,  ischemic cardiomyopathy s/p CRT-D, HTN, paroxysmal A-fib (status post ablation 1/14/25) on amiodarone and  Eliquis who presented to ED w c/o LESLIE and b/l  leg swelling.     Impression:  #SOB/LESLIE: Multifactorial  #HFrEF (EF 30%, s/p CRT-D)  #CAD s/p PCI to RCA and LAD  #NSTEMI/ Hx of frequent NSVT   #Paroxysmal AFib on Eliquis  #suspected GI bleed   #Diarrhea and Fever      Plan:  - Currently intubated, mechanically ventilated, and sedated  - Resume Bumex 1mg IV BID  - Echo 6/16: EF 31% stable compared to old   - Check BMP daily and monitor renal function. Maintain K >4.0, Mg >2.2    - Strict intake and output, maintain negative balance for now  - pBNP 2894. Repeat closer to discharge  - Restart home GDMT for HFrEF, when stable/medically appropriate   - For Afib - c/w AC, BB and amiodarone   - Pt needs ischemic eval, but has refused in the past   - f/u ID for abx recs   - Resumed AC, hg trending down, BUN trending up, C diff negative, f/u CT abdo/pelv, f/u GI

## 2025-06-19 NOTE — PROGRESS NOTE ADULT - SUBJECTIVE AND OBJECTIVE BOX
70yMale  Being followed for fecal occult blood positive   Interval history:  No hematemesis, melena, blood in stool reported.      PAST MEDICAL & SURGICAL HISTORY:   CHF (congestive heart failure)      Diabetes      CAD (coronary artery disease)      Chronic obstructive pulmonary disease (COPD)      HTN (hypertension)      Stented coronary artery      Dyslipidemia      GERD (gastroesophageal reflux disease)      Afib      CVA (cerebral vascular accident)      H/O heart artery stent      Heart valve replaced  aortic      History of Nissen fundoplication                Social History: No smoking. No alcohol. No illegal drug use.            MEDICATIONS  (STANDING):  aMIOdarone    Tablet 200 milliGRAM(s) Oral daily  apixaban 5 milliGRAM(s) Enteral Tube every 12 hours  atorvastatin 40 milliGRAM(s) Oral at bedtime  bumetanide Injectable 1 milliGRAM(s) IV Push two times a day  cefepime   IVPB 2000 milliGRAM(s) IV Intermittent every 12 hours  chlorhexidine 0.12% Liquid 15 milliLiter(s) Oral Mucosa every 12 hours  chlorhexidine 2% Cloths 1 Application(s) Topical <User Schedule>  clopidogrel Tablet 75 milliGRAM(s) Enteral Tube daily  dexMEDEtomidine Infusion 0.05 MICROgram(s)/kG/Hr (1.04 mL/Hr) IV Continuous <Continuous>  dextrose 5%. 1000 milliLiter(s) (100 mL/Hr) IV Continuous <Continuous>  dextrose 5%. 1000 milliLiter(s) (50 mL/Hr) IV Continuous <Continuous>  dextrose 50% Injectable 25 Gram(s) IV Push once  dextrose 50% Injectable 12.5 Gram(s) IV Push once  dextrose 50% Injectable 25 Gram(s) IV Push once  ezetimibe 10 milliGRAM(s) Oral daily  fentaNYL   Infusion. 0.5 MICROgram(s)/kG/Hr (4.17 mL/Hr) IV Continuous <Continuous>  glucagon  Injectable 1 milliGRAM(s) IntraMuscular once  insulin glargine Injectable (LANTUS) 34 Unit(s) SubCutaneous at bedtime  insulin lispro (ADMELOG) corrective regimen sliding scale   SubCutaneous every 6 hours  midodrine 10 milliGRAM(s) Oral every 8 hours  mupirocin 2% Nasal 1 Application(s) Both Nostrils every 12 hours  norepinephrine Infusion 0.05 MICROgram(s)/kG/Min (7.92 mL/Hr) IV Continuous <Continuous>  pantoprazole  Injectable 40 milliGRAM(s) IV Push two times a day  propofol Infusion 5 MICROgram(s)/kG/Min (2.54 mL/Hr) IV Continuous <Continuous>  vancomycin  IVPB 750 milliGRAM(s) IV Intermittent every 24 hours    MEDICATIONS  (PRN):  acetaminophen     Tablet .. 650 milliGRAM(s) Oral every 6 hours PRN Temp greater or equal to 38C (100.4F), Mild Pain (1 - 3)  albuterol/ipratropium for Nebulization 3 milliLiter(s) Nebulizer every 6 hours PRN Shortness of Breath and/or Wheezing  aluminum hydroxide/magnesium hydroxide/simethicone Suspension 30 milliLiter(s) Oral every 4 hours PRN Dyspepsia  dextrose Oral Gel 15 Gram(s) Oral once PRN Blood Glucose LESS THAN 70 milliGRAM(s)/deciliter  guaiFENesin Oral Liquid (Sugar-Free) 200 milliGRAM(s) Oral every 6 hours PRN Cough  melatonin 5 milliGRAM(s) Oral at bedtime PRN Insomnia  ondansetron Injectable 4 milliGRAM(s) IV Push every 8 hours PRN Nausea and/or Vomiting      Allergies:   Bactrim (Unknown)  sulfa drugs (Unknown)  Intolerances          REVIEW OF SYSTEMS:  unobtainable       VITAL SIGNS:   T(F): 100.6 (06-19-25 @ 11:01), Max: 100.8 (06-19-25 @ 06:00)  HR: 72 (06-19-25 @ 09:19) (62 - 100)  BP: 132/60 (06-19-25 @ 07:00) (92/55 - 174/79)  RR: 21 (06-19-25 @ 11:01) (9 - 32)  SpO2: 100% (06-19-25 @ 09:19) (91% - 100%)    PHYSICAL EXAM:  GENERAL: +intubated  HEAD:  Atraumatic, Normocephalic  EYES: conjunctiva and sclera clear  NECK: Supple, no thyromegaly  CHEST/LUNG: b/l rhonchi  HEART: Regular rate and rhythm; normal S1, S2, No murmurs.  ABDOMEN: Soft, nontender, nondistended; Bowel sounds present  NEUROLOGY: No asterixis or tremor.   SKIN: Intact, no jaundice            LABS:                        10.0   14.84 )-----------( 212      ( 19 Jun 2025 06:27 )             32.9     06-19    142  |  107  |  62[HH]  ----------------------------<  331[H]  4.8   |  25  |  1.5    Ca    8.7      19 Jun 2025 06:27  Mg     2.3     06-19    TPro  5.1[L]  /  Alb  2.7[L]  /  TBili  0.3  /  DBili  x   /  AST  12  /  ALT  8   /  AlkPhos  61  06-19    LIVER FUNCTIONS - ( 19 Jun 2025 06:27 )  Alb: 2.7 g/dL / Pro: 5.1 g/dL / ALK PHOS: 61 U/L / ALT: 8 U/L / AST: 12 U/L / GGT: x           PT/INR - ( 18 Jun 2025 13:40 )   PT: 19.00 sec;   INR: 1.59 ratio         PTT - ( 18 Jun 2025 13:40 )  PTT:29.0 sec    IMAGING:    < from: Xray Chest 1 View- PORTABLE-Routine (Xray Chest 1 View- PORTABLE-Routine in AM.) (06.19.25 @ 07:14) >    ACC: [id number] EXAM:  XR CHEST PORTABLE  ROUTINE 1V   ORDERED BY: SUMEET MEJIA     PROCEDURE DATE:  06/19/2025          INTERPRETATION:  CLINICAL HISTORY: Heart failure    COMPARISON: Yesterday.    TECHNIQUE: Portable frontal chest radiograph.    FINDINGS:    Support devices: Left subclavian AICD. Surgical clips upper abdomen.   Nasogastric extending below diaphragm. Tip not well-visualized.   Endotracheal tube tip is obscured.    Cardiac/mediastinum/hilum: Stable.    Lung parenchyma/Pleura: Bilateral lung opacities which may have mildly   increased from prior exam.    Skeleton/soft tissues: Stable.      IMPRESSION:    Bilateral lung opacities which appears to have mildly progressed from   prior study.    --- End of Report ---            LEONARDA CAMP MD; Attending Radiologist  This document has been electronically signed. Jun 19 2025  9:57AM    < end of copied text >

## 2025-06-19 NOTE — PROGRESS NOTE ADULT - SUBJECTIVE AND OBJECTIVE BOX
HPI: 70M with PMH of COPD, DM2, HFrEF, CAD, ICM s/p CRT-D, HTN, pAF< eliquis here with LESLIE and bilateral leg swelling, and found to have acute HFrEF and pulmonary edema, on vent, nebs, steroids, and bumex. Will need ischemic eval eventually. Patient was DNR/DNI/trial NIV on recent admission after d/w palliative, but wife opted this admission for intubation. Palliative c/s for GOC.    INTERVAL EVENTS  6/18: patient with no acute distress  6/19: patient intubated, no acute distress    ADVANCE DIRECTIVES:    [X ] Full Code [ ] DNR  MOLST  [ ]  Living Will  [ ]   DECISION MAKER(s):  [ ] Health Care Proxy(s)  [ ] Surrogate(s)  [ ] Guardian           Name(s): Phone Number(s): wife, son Neto    BASELINE (I)ADL(s) (prior to admission):  [place]: [ ]Total  [ ] Moderate [ ]Dependent  Palliative Performance Status Version 2:         %    http://npcrc.org/files/news/palliative_performance_scale_ppsv2.pdf    Allergies    Bactrim (Unknown)  sulfa drugs (Unknown)    Intolerances    MEDICATIONS  (STANDING):  aMIOdarone    Tablet 200 milliGRAM(s) Oral daily  apixaban 5 milliGRAM(s) Enteral Tube every 12 hours  atorvastatin 40 milliGRAM(s) Oral at bedtime  bumetanide Injectable 1 milliGRAM(s) IV Push two times a day  cefepime   IVPB 2000 milliGRAM(s) IV Intermittent every 12 hours  chlorhexidine 0.12% Liquid 15 milliLiter(s) Oral Mucosa every 12 hours  chlorhexidine 2% Cloths 1 Application(s) Topical <User Schedule>  clopidogrel Tablet 75 milliGRAM(s) Enteral Tube daily  dexMEDEtomidine Infusion 0.05 MICROgram(s)/kG/Hr (1.04 mL/Hr) IV Continuous <Continuous>  dextrose 5%. 1000 milliLiter(s) (100 mL/Hr) IV Continuous <Continuous>  dextrose 5%. 1000 milliLiter(s) (50 mL/Hr) IV Continuous <Continuous>  dextrose 50% Injectable 25 Gram(s) IV Push once  dextrose 50% Injectable 12.5 Gram(s) IV Push once  dextrose 50% Injectable 25 Gram(s) IV Push once  ezetimibe 10 milliGRAM(s) Oral daily  fentaNYL   Infusion. 0.5 MICROgram(s)/kG/Hr (4.17 mL/Hr) IV Continuous <Continuous>  glucagon  Injectable 1 milliGRAM(s) IntraMuscular once  insulin glargine Injectable (LANTUS) 34 Unit(s) SubCutaneous at bedtime  insulin lispro (ADMELOG) corrective regimen sliding scale   SubCutaneous every 6 hours  midodrine 10 milliGRAM(s) Oral every 8 hours  mupirocin 2% Nasal 1 Application(s) Both Nostrils every 12 hours  norepinephrine Infusion 0.05 MICROgram(s)/kG/Min (7.92 mL/Hr) IV Continuous <Continuous>  pantoprazole  Injectable 40 milliGRAM(s) IV Push two times a day  propofol Infusion 5 MICROgram(s)/kG/Min (2.54 mL/Hr) IV Continuous <Continuous>  vancomycin  IVPB 750 milliGRAM(s) IV Intermittent every 24 hours    MEDICATIONS  (PRN):  acetaminophen     Tablet .. 650 milliGRAM(s) Oral every 6 hours PRN Temp greater or equal to 38C (100.4F), Mild Pain (1 - 3)  albuterol/ipratropium for Nebulization 3 milliLiter(s) Nebulizer every 6 hours PRN Shortness of Breath and/or Wheezing  aluminum hydroxide/magnesium hydroxide/simethicone Suspension 30 milliLiter(s) Oral every 4 hours PRN Dyspepsia  dextrose Oral Gel 15 Gram(s) Oral once PRN Blood Glucose LESS THAN 70 milliGRAM(s)/deciliter  guaiFENesin Oral Liquid (Sugar-Free) 200 milliGRAM(s) Oral every 6 hours PRN Cough  melatonin 5 milliGRAM(s) Oral at bedtime PRN Insomnia  ondansetron Injectable 4 milliGRAM(s) IV Push every 8 hours PRN Nausea and/or Vomiting        PRESENT SYMPTOMS: [X ]Unable to obtain due to poor mentation   Source if other than patient:  [ ]Family   [ ]Team   [ X]All review of systems negative including pain and dyspnea unless noted below    All components of pain assessment below addressed. Blank spaces indicate that the patient did/could not complete the assessment.  Pain: [ ]yes [ ]no  QOL impact -   Location -                    Aggravating factors -  Quality -  Radiation -  Timing-  Severity (0-10 scale):  Minimal acceptable level (0-10 scale):     CPOT:    https://www.sccm.org/getattachment/sus96w87-5t4v-2y2v-8c2s-6762j3139w1o/Critical-Care-Pain-Observation-Tool-(CPOT)    PAIN AD Score: 0  http://geriatrictoolkit.[email or web address]/painad.pdf (press ctrl +  left click to view)    Dyspnea:                           [ ]None[ ]Mild [ ]Moderate [ ]Severe     Respiratory Distress Observation Scale (RDOS): 2    A score of 0 to 2 signifies little or no respiratory distress, 3 signifies mild distress, scores 4 to 6 indicate moderate distress, and scores greater than 7 signify severe distress  [email or web address]/[email or web address]-anxusiktyfd-flhwkavr-rtqqydsqqrx-wedvp.pdf    Anxiety:                             [ ]None[ ]Mild [ ]Moderate [ ]Severe   Fatigue:                             [ ]None[ ]Mild [ ]Moderate [ ]Severe   Nausea:                             [ ]None[ ]Mild [ ]Moderate [ ]Severe   Loss of appetite:              [ ]None[ ]Mild [ ]Moderate [ ]Severe   Constipation:                    [ ]None[ ]Mild [ ]Moderate [ ]Severe    Other Symptoms:    PHYSICAL EXAM:  Vital Signs Last 24 Hrs  T(C): 38.1 (19 Jun 2025 11:01), Max: 38.2 (19 Jun 2025 06:00)  T(F): 100.6 (19 Jun 2025 11:01), Max: 100.8 (19 Jun 2025 06:00)  HR: 99 (19 Jun 2025 15:21) (62 - 100)  BP: 167/72 (19 Jun 2025 14:00) (92/55 - 167/72)  BP(mean): 104 (19 Jun 2025 14:00) (71 - 105)  RR: 29 (19 Jun 2025 14:00) (8 - 32)  SpO2: 100% (19 Jun 2025 15:21) (99% - 100%)    Parameters below as of 19 Jun 2025 07:00  Patient On (Oxygen Delivery Method): ventilator          GENERAL:  [X ] No acute distress [ ]Lethargic  [ ]Unarousable  [ ]Verbal  [ ]Non-Verbal [ ]Cachexia    BEHAVIORAL/PSYCH:  [ ]Alert and Oriented x  [ ] Anxiety [ ] Delirium [ ] Agitation [X ] Calm   EYES: [ ] No scleral icterus [ ] Scleral icterus [ ] Closed  ENMT:  [ ]Dry mouth  [ ]No external oral lesions [ X] No external ear or nose lesions  CARDIOVASCULAR:  [ ]Regular [ ]Irregular [ ]Tachy [ ]Not Tachy  [ ]Aubrey [ ] Edema [ ] No edema  PULMONARY:  [ ]Tachypnea  [ ]Audible excessive secretions [X ] No labored breathing [ ] labored breathing  GASTROINTESTINAL: [ ]Soft  [ ]Distended  [ X]Not distended [ ]Non tender [ ]Tender  MUSCULOSKELETAL: [ ]No clubbing [ ] clubbing  [ X] No cyanosis [ ] cyanosis  NEUROLOGIC: [ ]No focal deficits  [ ]Follows commands  [ ]Does not follow commands  [ ]Cognitive impairment  [ ]Dysphagia  [ ]Dysarthria  [ ]Paresis   SKIN: [ ] Jaundiced [X ] Non-jaundiced [ ]Rash [ ]No Rash [ ] Warm [ ] Dry  MISC/LINES: [ ] ET tube [ ] Trach [ ]NGT/OGT [ ]PEG [ ]Mcmahon    CRITICAL CARE:  [ ] Shock Present  [ ]Septic [ ]Cardiogenic [ ]Neurologic [ ]Hypovolemic  [ ]  Vasopressors [ ]  Inotropes   [ ]Respiratory failure present [ ]Mechanical ventilation [ ]Non-invasive ventilatory support [ ]High flow  [ ]Acute  [ ]Chronic [ ]Hypoxic  [ ]Hypercarbic [ ]Other  [ ]Other organ failure     LABS: reviewed by me, notable for: elevated WBC, SCr; Ca WNL                                   10.0   14.84 )-----------( 212      ( 19 Jun 2025 06:27 )             32.9     06-19    142  |  107  |  62[HH]  ----------------------------<  331[H]  4.8   |  25  |  1.5    Ca    8.7      19 Jun 2025 06:27  Mg     2.3     06-19        RADIOLOGY & ADDITIONAL STUDIES: CXR personally reviewed by me: 6/19: possibly b/l opacities    PROTEIN CALORIE MALNUTRITION PRESENT: [ ]mild [ ]moderate [ ]severe [ ]underweight [ ]morbid obesity  https://www.andeal.org/vault/8710/web/files/ONC/Table_Clinical%20Characteristics%20to%20Document%20Malnutrition-White%20JV%20et%20al%202012.pdf    Height (cm): 160 (06-14-25 @ 08:00), 162.6 (06-03-25 @ 14:33), 160 (05-30-25 @ 11:11)  Weight (kg): 83.3 (06-14-25 @ 08:00), 75.9 (06-03-25 @ 14:33), 75.7 (05-30-25 @ 11:11)  BMI (kg/m2): 32.5 (06-14-25 @ 08:00), 28.7 (06-03-25 @ 14:33), 29.6 (05-30-25 @ 11:11)    [ ]PPSV2 < or = to 30% [ ]significant weight loss  [ ]poor nutritional intake  [ ]anasarca      [ ]Artificial Nutrition          Palliative Care Spiritual/Emotional Screening Tool Question  Severity (0-4):                    OR                    [X ] Unable to determine/NA  Score of 2 or greater indicates recommendation of Chaplaincy referral  Chaplaincy Referral: [ ] Yes [ ] Refused [ ] Following     Caregiver [place]:  [ ] Yes [ ] No    OR    [x ] Unable to determine. Will assess at later time if appropriate.  Social Work Referral [ ]  Patient and Family Centered Care Referral [ ]    Anticipatory Grief Present: [ ] Yes [ ] No    OR     [ x] Unable to determine. Will assess at later time if appropriate.  Social Work Referral [ ]  Patient and Family Centered Care Referral [ ]    REFERRALS:   [ ]Chaplaincy  [ ]Hospice  [ ]Child Life  [ ]Social Work  [ ]Case management [ ]Holistic Therapy     Palliative care education provided to patient and/or family    Goals of Care Document:     ______________  Wolfgang Ramos MD  Palliative Medicine  [facility]   of Geriatric and Palliative Medicine  [phone]

## 2025-06-19 NOTE — PROGRESS NOTE ADULT - ASSESSMENT
IMPRESSION:    Acute on chronic HFrEF decompensated heart failure EF 31%   Anemia   Acute pulmonary edema  COPD, not in active exacerbation  pulm HTN multifactorial secondary to cardiomyopathy reduced EF, likely LAW, baseline COPD  chronic hypoxia from all the above  B/L small pleural effusions to  pulmonary edema    PLAN:    CNS: Daily SAT, Seroquel QHS if needed for agitation     HEENT: Oral care    PULMONARY:  HOB @ 45 degrees. CXR noted w/congestion, Duonebs q 6 hrs PRN, SBT if passes SAT     CARDIOVASCULAR: TTE noted , volume contraction as tolerated, Cardiology follow up, Bumex 1mg BId scheduled     GI: GI prophylaxis. Feeding. C. Diff GI PCR negative, trend Hb, f/u GI reccs, CTA abdomen if Hb drops further     RENAL:  Follow up lytes. Correct as needed, scheduled Bumex     INFECTIOUS DISEASE: Follow up cultures, MRSA +, vancomycin and cefepime for now    HEMATOLOGICAL: On AC, resume, okay per GI, f/u Us duplex LE for low grade fevers      ENDOCRINE:  Follow up FS.  Insulin protocol if needed.    MUSCULOSKELETAL: off loading     Palliative follow up  MICU monitoring      IMPRESSION:    Acute on chronic HFrEF decompensated heart failure EF 31%   Anemia   Acute pulmonary edema  COPD, not in active exacerbation  pulm HTN multifactorial secondary to cardiomyopathy reduced EF, likely LAW, baseline COPD  chronic hypoxia from all the above  B/L small pleural effusions due to  pulmonary edema    PLAN:    CNS: Daily SAT, Seroquel QHS if needed for agitation     HEENT: Oral care    PULMONARY:  HOB @ 45 degrees. CXR noted w/congestion, Duonebs q 6 hrs PRN, SBT if passes SAT     CARDIOVASCULAR: TTE noted , volume contraction as tolerated, Cardiology follow up, Bumex 1mg BId scheduled     GI: GI prophylaxis. Feeding. C. Diff GI PCR negative, trend Hb, f/u GI reccs, CTA abdomen if Hb drops further     RENAL:  Follow up lytes. Correct as needed, scheduled Bumex     INFECTIOUS DISEASE: Follow up cultures, MRSA +, vancomycin and cefepime for now    HEMATOLOGICAL: On AC, resume, okay per GI, f/u Us duplex LE for low grade fevers      ENDOCRINE:  Follow up FS.  Insulin protocol if needed.    MUSCULOSKELETAL: off loading     Palliative follow up  MICU monitoring

## 2025-06-20 NOTE — PROGRESS NOTE ADULT - ASSESSMENT
70 year old male with media l history of CKD3,  COPD on 2L home O2, diabetes, chronic  HFrEF  s/p CRT-D, AVR, hypertension, paroxysmal A-fib (status post ablation 1/14/25)  CAD admitted  for dyspnea on exertion and leg swelling.     acute respiratory failure / sepsis  / aspiration pneumonia     - febrile    - c-diff is negative   - titrate pressors to maintain MAP>65   - antibiotics as per ID   - Bumex as per cardiology   - check CT abdomen results   - SAT and if tolerates -> SBT   - 50 minutes total spent today on patient care .

## 2025-06-20 NOTE — PROGRESS NOTE ADULT - ASSESSMENT
IMPRESSION:    Acute on chronic HFrEF decompensated heart failure EF 31%   Anemia   Acute pulmonary edema  COPD, not in active exacerbation  pulm HTN multifactorial secondary to cardiomyopathy reduced EF, likely LAW, baseline COPD  chronic hypoxia from all the above  B/L small pleural effusions due to  pulmonary edema    PLAN:    CNS: Daily SAT, Seroquel QHS if needed for agitation     HEENT: Oral care    PULMONARY:  HOB @ 45 degrees. CXR noted w/congestion, Duonebs q 6 hrs PRN, SBT if passes SAT     CARDIOVASCULAR: TTE noted , volume contraction as tolerated, Cardiology follow up, Bumex 1mg BId scheduled     GI: GI prophylaxis. Feeding. C. Diff GI PCR negative, trend Hb, f/u GI reccs, f/u CT abdomen    RENAL:  Follow up lytes. Correct as needed, scheduled Bumex     INFECTIOUS DISEASE: Follow up cultures, MRSA +, vancomycin and cefepime for now    HEMATOLOGICAL: On AC, resume, okay per GI, f/u Us duplex LE with no DVT, hold AC if active bleed      ENDOCRINE:  Follow up FS.  Insulin protocol if needed.    MUSCULOSKELETAL: off loading     Palliative follow up  MICU monitoring

## 2025-06-20 NOTE — PROGRESS NOTE ADULT - SUBJECTIVE AND OBJECTIVE BOX
Patient is a 70y old  Male who presents with a chief complaint of SOB (20 Jun 2025 10:00)        Over Night Events: No acute events noted, on SAT         ROS:     All ROS are negative except HPI         PHYSICAL EXAM    ICU Vital Signs Last 24 Hrs  T(C): 38 (20 Jun 2025 07:20), Max: 38.4 (19 Jun 2025 20:00)  T(F): 100.4 (20 Jun 2025 07:20), Max: 101.1 (19 Jun 2025 20:00)  HR: 81 (20 Jun 2025 08:00) (63 - 100)  BP: 101/59 (20 Jun 2025 08:00) (97/53 - 179/76)  BP(mean): 76 (20 Jun 2025 08:00) (71 - 111)  ABP: --  ABP(mean): --  RR: 20 (20 Jun 2025 08:00) (19 - 32)  SpO2: 100% (20 Jun 2025 08:00) (99% - 100%)    O2 Parameters below as of 19 Jun 2025 22:00  Patient On (Oxygen Delivery Method): ventilator    O2 Concentration (%): 40        CONSTITUTIONAL:  Ill appearing.      ENT:   Airway patent,   Mouth with normal mucosa.   No thrush    EYES:   Pupils equal,   Round and reactive to light.    CARDIAC:   Normal rate,   Regular rhythm.        RESPIRATORY:   No wheezing  Bilateral BS  Normal chest expansion  Not tachypneic,  No use of accessory muscles    GASTROINTESTINAL:  Abdomen soft,   Non-tender,   No guarding,   + BS      MUSCULOSKELETAL:   Range of motion is not limited    NEUROLOGICAL:   sedated     SKIN:   Skin normal color for race      06-19-25 @ 07:01  -  06-20-25 @ 07:00  --------------------------------------------------------  IN:    Dexmedetomidine: 625 mL    dextrose 5% + lactated ringers: 280 mL    Enteral Tube Flush: 120 mL    FentaNYL: 457 mL    IV PiggyBack: 250 mL  Total IN: 1732 mL    OUT:    Indwelling Catheter - Urethral (mL): 4355 mL    Norepinephrine: 0 mL    Peptamen A.F.: 0 mL    Propofol: 0 mL    Rectal Tube (mL): 200 mL  Total OUT: 4555 mL    Total NET: -2823 mL      06-20-25 @ 07:01  -  06-20-25 @ 10:42  --------------------------------------------------------  IN:  Total IN: 0 mL    OUT:    Indwelling Catheter - Urethral (mL): 185 mL  Total OUT: 185 mL    Total NET: -185 mL          LABS:                            9.6    12.36 )-----------( 216      ( 20 Jun 2025 06:16 )             31.6                                               06-20    147[H]  |  109  |  56[H]  ----------------------------<  189[H]  4.3   |  26  |  1.5    Ca    8.6      20 Jun 2025 06:16  Mg     2.1     06-20    TPro  5.4[L]  /  Alb  2.8[L]  /  TBili  0.4  /  DBili  x   /  AST  12  /  ALT  9   /  AlkPhos  58  06-20      PT/INR - ( 18 Jun 2025 13:40 )   PT: 19.00 sec;   INR: 1.59 ratio         PTT - ( 18 Jun 2025 13:40 )  PTT:29.0 sec                                       Urinalysis Basic - ( 20 Jun 2025 06:16 )    Color: x / Appearance: x / SG: x / pH: x  Gluc: 189 mg/dL / Ketone: x  / Bili: x / Urobili: x   Blood: x / Protein: x / Nitrite: x   Leuk Esterase: x / RBC: x / WBC x   Sq Epi: x / Non Sq Epi: x / Bacteria: x                                                  LIVER FUNCTIONS - ( 20 Jun 2025 06:16 )  Alb: 2.8 g/dL / Pro: 5.4 g/dL / ALK PHOS: 58 U/L / ALT: 9 U/L / AST: 12 U/L / GGT: x                                                                                               Mode: AC/ CMV (Assist Control/ Continuous Mandatory Ventilation)  RR (machine): 20  TV (machine): 400  FiO2: 40  PEEP: 8  ITime: 1  MAP: 11  PIP: 18                                      ABG - ( 20 Jun 2025 04:27 )  pH, Arterial: 7.37  pH, Blood: x     /  pCO2: 51    /  pO2: 134   / HCO3: 30    / Base Excess: 3.4   /  SaO2: 99.3                MEDICATIONS  (STANDING):  aMIOdarone    Tablet 200 milliGRAM(s) Oral daily  apixaban 5 milliGRAM(s) Enteral Tube every 12 hours  atorvastatin 40 milliGRAM(s) Oral at bedtime  bumetanide Injectable 1 milliGRAM(s) IV Push daily  cefepime   IVPB 2000 milliGRAM(s) IV Intermittent every 12 hours  chlorhexidine 0.12% Liquid 15 milliLiter(s) Oral Mucosa every 12 hours  chlorhexidine 2% Cloths 1 Application(s) Topical <User Schedule>  clopidogrel Tablet 75 milliGRAM(s) Enteral Tube daily  dexMEDEtomidine Infusion 0.05 MICROgram(s)/kG/Hr (1.04 mL/Hr) IV Continuous <Continuous>  dextrose 5%. 1000 milliLiter(s) (100 mL/Hr) IV Continuous <Continuous>  dextrose 5%. 1000 milliLiter(s) (50 mL/Hr) IV Continuous <Continuous>  dextrose 50% Injectable 25 Gram(s) IV Push once  dextrose 50% Injectable 12.5 Gram(s) IV Push once  dextrose 50% Injectable 25 Gram(s) IV Push once  ezetimibe 10 milliGRAM(s) Oral daily  fentaNYL   Infusion. 0.5 MICROgram(s)/kG/Hr (4.17 mL/Hr) IV Continuous <Continuous>  glucagon  Injectable 1 milliGRAM(s) IntraMuscular once  insulin glargine Injectable (LANTUS) 34 Unit(s) SubCutaneous at bedtime  insulin lispro (ADMELOG) corrective regimen sliding scale   SubCutaneous every 6 hours  midodrine 10 milliGRAM(s) Oral every 8 hours  pantoprazole  Injectable 40 milliGRAM(s) IV Push two times a day  propofol Infusion 5 MICROgram(s)/kG/Min (2.54 mL/Hr) IV Continuous <Continuous>  vancomycin  IVPB 1000 milliGRAM(s) IV Intermittent every 24 hours    MEDICATIONS  (PRN):  acetaminophen     Tablet .. 650 milliGRAM(s) Oral every 6 hours PRN Temp greater or equal to 38C (100.4F), Mild Pain (1 - 3)  albuterol/ipratropium for Nebulization 3 milliLiter(s) Nebulizer every 6 hours PRN Shortness of Breath and/or Wheezing  aluminum hydroxide/magnesium hydroxide/simethicone Suspension 30 milliLiter(s) Oral every 4 hours PRN Dyspepsia  dextrose Oral Gel 15 Gram(s) Oral once PRN Blood Glucose LESS THAN 70 milliGRAM(s)/deciliter  guaiFENesin Oral Liquid (Sugar-Free) 200 milliGRAM(s) Oral every 6 hours PRN Cough  melatonin 5 milliGRAM(s) Oral at bedtime PRN Insomnia  ondansetron Injectable 4 milliGRAM(s) IV Push every 8 hours PRN Nausea and/or Vomiting      New X-rays reviewed:                                                                                  ECHO

## 2025-06-20 NOTE — PROGRESS NOTE ADULT - ASSESSMENT
This is a 70 year old male with past medical history of  COPD (on 2L home O2), DM 2  HFrEF 30%, CAD,  ischemic cardiomyopathy s/p CRT-D, HTN, paroxysmal A-fib (status post ablation 1/14/25) on amiodarone and  Eliquis who presented to ED w c/o LESLIE and b/l  leg swelling.    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  #Obesity BMI (kg/m2): 32.5, 28.7, 29.6, 30.1    Recently Enterovirus Positive Discharged on PO steroids Vantin+Doxy    Covid/Flu/RSV negative  MRSA nares positive  , Fungitell 108 (very mildly elevated)   Blood cultures NGTD  Sputum cultures Normal Respiratory magda  C.diff negative     RECOMMENDATIONS  -Unclear cause of fevers- Possible drug induced vs GI bleeding?  -Cardiology follow up. Diuresis.  -IV Vanc. Dosing as per pharmacy.  -IV Cefepime 2 gram q 12 hours. (S/D 6/14)  -Will consider stopping all the abx after 6/20 and monitor.   -Off loading to prevent pressure sores and preventive measures to avoid aspiration. TOV. GOC. Recurrent admissions expected.     If any questions, please send a message or call on [employer] Teams  Please continue to update ID with any pertinent new laboratory or radiographic findings.    Benigno Pond M.D  Infectious Diseases Attending/   [employer] at [facility]   This is a 70 year old male with past medical history of  COPD (on 2L home O2), DM 2  HFrEF 30%, CAD,  ischemic cardiomyopathy s/p CRT-D, HTN, paroxysmal A-fib (status post ablation 1/14/25) on amiodarone and  Eliquis who presented to ED w c/o LESLIE and b/l  leg swelling.    IMPRESSION  #Chronic Leukocytosis- Now Worsened- Possible leukemoid reaction  #Acute on chronic hypoxic respiratory failure  #Heart failure with reduced EF, Acute on chronic exacerbation  #Large Bilateral pleural effusions/Pulmonary edema- Improving  #Can not rule out PNA vs Aspiration  #Anemia- Possible ischemic in nature resulting in loose stools  #CT abdomen noted for colitis- Suspect Ischemic colitis.   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  #Obesity BMI (kg/m2): 32.5, 28.7, 29.6, 30.1    Recently Enterovirus Positive Discharged on PO steroids Vantin+Doxy    Covid/Flu/RSV negative  MRSA nares positive  , Fungitell 108 (very mildly elevated)   Blood cultures NGTD  Sputum cultures Normal Respiratory magda  C.diff negative     RECOMMENDATIONS  -Unclear cause of fevers- Possible drug induced vs Ischemic Colitis  -Cardiology follow up. Diuresis.  -IV Vanc. Dosing as per pharmacy.  -IV Cefepime 2 gram q 12 hours. (S/D 6/14)  -Stop all the abx after 6/20, will have gotten 7 days of broad spectrum abx. Monitor off abx.  -Off loading to prevent pressure sores and preventive measures to avoid aspiration. TOV. GOC. Recurrent admissions expected.     If any questions, please send a message or call on [employer] Teams  Please continue to update ID with any pertinent new laboratory or radiographic findings.    Benigno Pond M.D  Infectious Diseases Attending/   [employer] at [facility]

## 2025-06-20 NOTE — PROGRESS NOTE ADULT - ASSESSMENT
70 year old male with past medical history of  COPD (on 2L home O2), DM 2  HFrEF 30%, CAD,  ischemic cardiomyopathy s/p CRT-D, HTN, paroxysmal A-fib (status post ablation 1/14/25) on amiodarone and  Eliquis who presented to ED w c/o LESLIE and b/l  leg swelling.   Patient remained intubated in ICU. Continue on IV bumex for volume overload.     Impression:  #SOB/LESLIE: Multifactorial  #HFrEF (EF 30%, s/p CRT-D)  #CAD s/p PCI to RCA and LAD  #NSTEMI/ Hx of frequent NSVT   #Paroxysmal AFib on Eliquis    Recommendation:  - Patient remained intubated and sedated. Remain mildly hypervolemic. UO recorded as 4.5L with net negative 2.8L   - c/w gentle IV diuresis given possible infection : continue IV bumex to keep net negative ~1-2L. monitor volume status closely   -  ID following for possible infection   - wean off pressors as able  -  monitor end organ perfusion markers closely ( LFT, renal function, Lactate).  Maintain K >4.0, Mg >2.2    - Strict intake and output, maintain negative balance for now  - Restart home GDMT for HFrEF, when stable/medically appropriate   - Pt needs ischemic eval, but has refused in the past   - ongoing GOC discussion

## 2025-06-20 NOTE — PROGRESS NOTE ADULT - ASSESSMENT
70yMale with past medical history of  COPD (on 2L home O2), DM 2  HFrEF 30%, CAD,  ischemic cardiomyopathy s/p CRT-D, HTN, paroxysmal A-fib (status post ablation 1/14/25) on amiodarone and  Eliquis who presented  for b/l leg swelling patient admitted with acute respiratory failure, sepsis, possible aspiration pneumonia and new diarrhea. Patient with digni-shield in.  No hematemesis, melena, blood in stool reported, patient had positive fecal occult blood test.     #Acute Diarrhea--->resolved  #anemia no gross GI bleeding  #fecal occult blood test positive  #leukocytosis  #There is mild sigmoid and descending colon wall thickening with   pericolonic inflammatory changes; possibly colitis  Rec  -check GI PCR if diarrhea recurs   -C-diff WNL  -can resume feeds as tolerated  -can continue AC if needed  -will eventually benefit from Colonoscopy   -Maintain Hemodynamic Stability   -Monitor CBC  -CMP,Optimize Electrolytes  -PT,PTT,INR  -EKG, Chest-Xray   -Transfuse prn to hgb >8  -Two large bore IV lines  -PPI BID  -Monitor Vital Signs  -Monitor Stool For blood, frequency, consistency, melena  -Active Type and Screen  -Iron Studies, Folate, Vitamin B12 levels  - Follow up with our [facility] located at [address] [place] [address]. Phone Number: [phone] for outpatient EGD/Colonoscopy      #Splenic infarct  Rec  -consider vascular eval    #Question of indeterminate pancreatic body hypodensity measuring 1 cm on   image 31 series 301  Rec  -outpatient consider MRI pancreatic protocol     #Small to moderate bilateral pleural effusions with associated opacities.   Rec  - Care as per primary team

## 2025-06-20 NOTE — PROGRESS NOTE ADULT - SUBJECTIVE AND OBJECTIVE BOX
SERENAMONIQUE  70y, Male    LOS  6d    INTERVAL EVENTS/HPI  - T(F): , Max: 101.1 (06-19-25 @ 20:00)  - WBC Count: 12.36 (06-20-25 @ 06:16)  WBC Count: 15.45 (06-19-25 @ 19:00)  - Creatinine: 1.5 (06-20-25 @ 06:16)  Creatinine: 1.5 (06-19-25 @ 06:27)    REVIEW OF SYSTEMS: cannot obtain further history from the patient secondary to altered mental status or sedation      ANTIMICROBIALS:   cefepime   IVPB 2000 every 12 hours  vancomycin  IVPB 1000 every 24 hours      OTHER MEDS: MEDICATIONS  (STANDING):  acetaminophen     Tablet .. 650 every 6 hours PRN  albuterol/ipratropium for Nebulization 3 every 6 hours PRN  aluminum hydroxide/magnesium hydroxide/simethicone Suspension 30 every 4 hours PRN  aMIOdarone    Tablet 200 daily  apixaban 5 every 12 hours  atorvastatin 40 at bedtime  bumetanide Injectable 1 two times a day  clopidogrel Tablet 75 daily  dexMEDEtomidine Infusion 0.05 <Continuous>  dextrose 50% Injectable 25 once  dextrose 50% Injectable 12.5 once  dextrose 50% Injectable 25 once  dextrose Oral Gel 15 once PRN  ezetimibe 10 daily  fentaNYL   Infusion. 0.5 <Continuous>  glucagon  Injectable 1 once  guaiFENesin Oral Liquid (Sugar-Free) 200 every 6 hours PRN  insulin glargine Injectable (LANTUS) 34 at bedtime  insulin lispro (ADMELOG) corrective regimen sliding scale  every 6 hours  melatonin 5 at bedtime PRN  midodrine 10 every 8 hours  ondansetron Injectable 4 every 8 hours PRN  pantoprazole  Injectable 40 two times a day  propofol Infusion 5 <Continuous>      Vital Signs Last 24 Hrs  T(F): 100.4 (06-20-25 @ 07:20), Max: 102.9 (06-16-25 @ 23:01)    Vital Signs Last 24 Hrs  HR: 81 (06-20-25 @ 08:00) (63 - 100)  BP: 101/59 (06-20-25 @ 08:00) (97/53 - 179/76)  RR: 20 (06-20-25 @ 08:00)  SpO2: 100% (06-20-25 @ 08:00) (99% - 100%)  Wt(kg): --    EXAM:  GENERAL: On MV  HEAD: No head lesions  NECK: Supple  CHEST/LUNG: Shallow breath sounds   HEART: S1 S2  ABDOMEN: Soft, nontender +Mcmahon  EXTREMITIES: No clubbing  MSK: +1 edema   SKIN: No rashes or lesions, no superficial thrombophlebitis    Labs:                        9.6    12.36 )-----------( 216      ( 20 Jun 2025 06:16 )             31.6     06-20    147[H]  |  109  |  56[H]  ----------------------------<  189[H]  4.3   |  26  |  1.5    Ca    8.6      20 Jun 2025 06:16  Mg     2.1     06-20    TPro  5.4[L]  /  Alb  2.8[L]  /  TBili  0.4  /  DBili  x   /  AST  12  /  ALT  9   /  AlkPhos  58  06-20      WBC Trend:  WBC Count: 12.36 (06-20-25 @ 06:16)  WBC Count: 15.45 (06-19-25 @ 19:00)  WBC Count: 14.84 (06-19-25 @ 06:27)  WBC Count: 18.93 (06-18-25 @ 19:42)      Creatine Trend:  Creatinine: 1.5 (06-20)  Creatinine: 1.5 (06-19)  Creatinine: 1.4 (06-18)  Creatinine: 1.9 (06-17)      Liver Biochemical Testing Trend:  Alanine Aminotransferase (ALT/SGPT): 9 (06-20)  Alanine Aminotransferase (ALT/SGPT): 8 (06-19)  Alanine Aminotransferase (ALT/SGPT): 9 (06-18)  Alanine Aminotransferase (ALT/SGPT): 7 (06-17)  Alanine Aminotransferase (ALT/SGPT): 11 (06-16)  Aspartate Aminotransferase (AST/SGOT): 12 (06-20-25 @ 06:16)  Aspartate Aminotransferase (AST/SGOT): 12 (06-19-25 @ 06:27)  Aspartate Aminotransferase (AST/SGOT): 22 (06-18-25 @ 13:40)  Aspartate Aminotransferase (AST/SGOT): 12 (06-17-25 @ 06:06)  Aspartate Aminotransferase (AST/SGOT): 18 (06-16-25 @ 06:28)  Bilirubin Total: 0.4 (06-20)  Bilirubin Total: 0.3 (06-19)  Bilirubin Total: 0.6 (06-18)  Bilirubin Total: 0.2 (06-17)  Bilirubin Total: 0.4 (06-16)      Trend LDH  06-15-25 @ 06:45  375[H]      Urinalysis Basic - ( 20 Jun 2025 06:16 )    Color: x / Appearance: x / SG: x / pH: x  Gluc: 189 mg/dL / Ketone: x  / Bili: x / Urobili: x   Blood: x / Protein: x / Nitrite: x   Leuk Esterase: x / RBC: x / WBC x   Sq Epi: x / Non Sq Epi: x / Bacteria: x        MICROBIOLOGY:  Vancomycin Level, Random: 11.4 (06-19 @ 06:27)  Vancomycin Level, Trough: 7.0 (06-18 @ 18:00)  Vancomycin Level, Trough: 8.1 (06-17 @ 11:01)  Vancomycin Level, Random: 14.5 (06-15 @ 06:45)    Male    Culture - Blood (collected 16 Jun 2025 06:28)  Source: Blood Blood  Preliminary Report:    No growth at 72 Hours    Culture - Sputum (collected 15 Kleber 2025 16:44)  Source: Sputum Sputum  Final Report:    Commensal magda consistent with body site    Culture - Blood (collected 14 Jun 2025 15:36)  Source: Blood None  Final Report:    No growth at 5 days    Culture - Blood (collected 02 Jun 2025 18:05)  Source: Blood Blood  Final Report:    No growth at 5 days    Culture - Blood (collected 02 Jun 2025 18:05)  Source: Blood Blood  Final Report:    No growth at 5 days    Culture - Blood (collected 18 Apr 2025 22:18)  Source: Blood Blood  Final Report:    No growth at 5 days    Culture - Blood (collected 18 Apr 2025 22:17)  Source: Blood Blood  Final Report:    No growth at 5 days    Culture - Blood (collected 28 Mar 2025 20:45)  Source: Blood Blood-Peripheral  Final Report:    No growth at 5 days    Culture - Blood (collected 31 Dec 2024 15:45)  Source: .Blood BLOOD  Final Report:    No growth at 5 days    Culture - Blood (collected 03 Sep 2023 12:50)  Source: .Blood Blood  Final Report:    No growth at 5 days      HIV-1/2 Combo Result: Nonreact (06-16-25 @ 06:28)  HIV-1/2 Combo Result: Nonreact (01-04-25 @ 05:58)                    Fungitell: 108 pg/mL (06-16 @ 06:28)  Aspergillus Galactomannan Antigen: 0.06 Index (06-15 @ 06:45)    COVID-19 PCR: NotDetec (05-15-25 @ 09:50)      Procalcitonin: 1.49 (06-14)          Lactate Dehydrogenase, Serum: 375 (06-15)      Troponin T, High Sensitivity Result: 121 (06-15)  Troponin T, High Sensitivity Result: 141 (06-14)  Troponin T, High Sensitivity Result: 134 (06-14)  Troponin T, High Sensitivity Result: 137 (06-14)  Troponin T, High Sensitivity Result: 121 (06-14)  Troponin T, High Sensitivity Result: 113 (06-14)    Blood Gas Arterial, Lactate: 0.6 (06-20 @ 04:27)  Blood Gas Arterial, Lactate: 0.5 (06-19 @ 16:25)  Blood Gas Arterial, Lactate: 0.6 (06-19 @ 05:50)  Blood Gas Arterial, Lactate: 0.6 (06-18 @ 10:27)        RADIOLOGY & ADDITIONAL TESTS:  I have personally reviewed the relevant images.   CXR      CT  CT Chest No Cont:   ACC: [id number] EXAM:  CT CHEST   ORDERED BY: SUMEET MEJIA     PROCEDURE DATE:  06/19/2025          INTERPRETATION:  Indication::Fevers    Technique: Non contrast CT scan of the thorax was performed from lung   apices to adrenal glands.  Sagittal and coronal reformatted images were generated.    Comparison: CT chest-6/2/2025    CT abdomen and pelvis  performed on the same day, see separate report.  Findings:    Tubes/Lines/Devices : Left ICD stable position. NG tube passed into   distal stomach.ET tube satisfactory position.  Mediastinum/hilum: .Likely reactive mediastinal lymph nodes  Chest Wall/ Breasts: Unremarkable   Heart/Great Vessels:Poststernotomy. Stable cardiomegaly. No pericardial   effusions. Stable cardiomegaly. Numerous surgical clips EG junction.  Abdomen: See separate report  Bones and soft tissues: Degenerative changes thoracic spine  Lungs, Pleura, and Airways: Patent central airways. Interval decrease in   volume of bilateral pleural effusions now moderate size with adjacent   lower lobe compressive atelectasis. Previous patchy opacities in the   upper lobes have resolved. No pneumothorax    No suspicious pulmonary nodules or pneumothorax.  IMPRESSION:    Since  6/2/2025.      Interval decrease in volume of bilateral pleural effusions, now moderate   size with adjacent lower lobe compressive atelectasis.     Previous patchy opacities in the upper lobes have resolved. No   pneumothorax    No suspicious pulmonary nodules or pneumothorax.    Support devices, in satisfactory position.    CT abdomen and pelvis  performed on the same day, see separate report.      --- End of Report ---            JAZLYN LO MD; Attending Radiologist  This document has been electronically signed. Jun 19 2025  8:46PM (06-19-25 @ 16:13)        WEIGHT  Weight (kg): 83.3 (06-14-25 @ 08:00)  Creatinine: 1.5 mg/dL (06-20-25 @ 06:16)      All available historical records have been reviewed       SERENAMONIQUE  70y, Male    LOS  6d    INTERVAL EVENTS/HPI  - T(F): , Max: 101.1 (06-19-25 @ 20:00)  - WBC Count: 12.36 (06-20-25 @ 06:16)  WBC Count: 15.45 (06-19-25 @ 19:00)  - Creatinine: 1.5 (06-20-25 @ 06:16)  Creatinine: 1.5 (06-19-25 @ 06:27)    REVIEW OF SYSTEMS: cannot obtain further history from the patient secondary to altered mental status or sedation      ANTIMICROBIALS:   cefepime   IVPB 2000 every 12 hours  vancomycin  IVPB 1000 every 24 hours      OTHER MEDS: MEDICATIONS  (STANDING):  acetaminophen     Tablet .. 650 every 6 hours PRN  albuterol/ipratropium for Nebulization 3 every 6 hours PRN  aluminum hydroxide/magnesium hydroxide/simethicone Suspension 30 every 4 hours PRN  aMIOdarone    Tablet 200 daily  apixaban 5 every 12 hours  atorvastatin 40 at bedtime  bumetanide Injectable 1 two times a day  clopidogrel Tablet 75 daily  dexMEDEtomidine Infusion 0.05 <Continuous>  dextrose 50% Injectable 25 once  dextrose 50% Injectable 12.5 once  dextrose 50% Injectable 25 once  dextrose Oral Gel 15 once PRN  ezetimibe 10 daily  fentaNYL   Infusion. 0.5 <Continuous>  glucagon  Injectable 1 once  guaiFENesin Oral Liquid (Sugar-Free) 200 every 6 hours PRN  insulin glargine Injectable (LANTUS) 34 at bedtime  insulin lispro (ADMELOG) corrective regimen sliding scale  every 6 hours  melatonin 5 at bedtime PRN  midodrine 10 every 8 hours  ondansetron Injectable 4 every 8 hours PRN  pantoprazole  Injectable 40 two times a day  propofol Infusion 5 <Continuous>      Vital Signs Last 24 Hrs  T(F): 100.4 (06-20-25 @ 07:20), Max: 102.9 (06-16-25 @ 23:01)    Vital Signs Last 24 Hrs  HR: 81 (06-20-25 @ 08:00) (63 - 100)  BP: 101/59 (06-20-25 @ 08:00) (97/53 - 179/76)  RR: 20 (06-20-25 @ 08:00)  SpO2: 100% (06-20-25 @ 08:00) (99% - 100%)  Wt(kg): --    EXAM:  GENERAL: On MV  HEAD: No head lesions  NECK: Supple  CHEST/LUNG: Shallow breath sounds   HEART: S1 S2  ABDOMEN: Soft, nontender +Mcmahon  EXTREMITIES: No clubbing  MSK: +1 edema   SKIN: No rashes or lesions, no superficial thrombophlebitis    Labs:                        9.6    12.36 )-----------( 216      ( 20 Jun 2025 06:16 )             31.6     06-20    147[H]  |  109  |  56[H]  ----------------------------<  189[H]  4.3   |  26  |  1.5    Ca    8.6      20 Jun 2025 06:16  Mg     2.1     06-20    TPro  5.4[L]  /  Alb  2.8[L]  /  TBili  0.4  /  DBili  x   /  AST  12  /  ALT  9   /  AlkPhos  58  06-20      WBC Trend:  WBC Count: 12.36 (06-20-25 @ 06:16)  WBC Count: 15.45 (06-19-25 @ 19:00)  WBC Count: 14.84 (06-19-25 @ 06:27)  WBC Count: 18.93 (06-18-25 @ 19:42)      Creatine Trend:  Creatinine: 1.5 (06-20)  Creatinine: 1.5 (06-19)  Creatinine: 1.4 (06-18)  Creatinine: 1.9 (06-17)      Liver Biochemical Testing Trend:  Alanine Aminotransferase (ALT/SGPT): 9 (06-20)  Alanine Aminotransferase (ALT/SGPT): 8 (06-19)  Alanine Aminotransferase (ALT/SGPT): 9 (06-18)  Alanine Aminotransferase (ALT/SGPT): 7 (06-17)  Alanine Aminotransferase (ALT/SGPT): 11 (06-16)  Aspartate Aminotransferase (AST/SGOT): 12 (06-20-25 @ 06:16)  Aspartate Aminotransferase (AST/SGOT): 12 (06-19-25 @ 06:27)  Aspartate Aminotransferase (AST/SGOT): 22 (06-18-25 @ 13:40)  Aspartate Aminotransferase (AST/SGOT): 12 (06-17-25 @ 06:06)  Aspartate Aminotransferase (AST/SGOT): 18 (06-16-25 @ 06:28)  Bilirubin Total: 0.4 (06-20)  Bilirubin Total: 0.3 (06-19)  Bilirubin Total: 0.6 (06-18)  Bilirubin Total: 0.2 (06-17)  Bilirubin Total: 0.4 (06-16)      Trend LDH  06-15-25 @ 06:45  375[H]      Urinalysis Basic - ( 20 Jun 2025 06:16 )    Color: x / Appearance: x / SG: x / pH: x  Gluc: 189 mg/dL / Ketone: x  / Bili: x / Urobili: x   Blood: x / Protein: x / Nitrite: x   Leuk Esterase: x / RBC: x / WBC x   Sq Epi: x / Non Sq Epi: x / Bacteria: x        MICROBIOLOGY:  Vancomycin Level, Random: 11.4 (06-19 @ 06:27)  Vancomycin Level, Trough: 7.0 (06-18 @ 18:00)  Vancomycin Level, Trough: 8.1 (06-17 @ 11:01)  Vancomycin Level, Random: 14.5 (06-15 @ 06:45)    Male    Culture - Blood (collected 16 Jun 2025 06:28)  Source: Blood Blood  Preliminary Report:    No growth at 72 Hours    Culture - Sputum (collected 15 Kleber 2025 16:44)  Source: Sputum Sputum  Final Report:    Commensal magda consistent with body site    Culture - Blood (collected 14 Jun 2025 15:36)  Source: Blood None  Final Report:    No growth at 5 days    Culture - Blood (collected 02 Jun 2025 18:05)  Source: Blood Blood  Final Report:    No growth at 5 days    Culture - Blood (collected 02 Jun 2025 18:05)  Source: Blood Blood  Final Report:    No growth at 5 days    Culture - Blood (collected 18 Apr 2025 22:18)  Source: Blood Blood  Final Report:    No growth at 5 days    Culture - Blood (collected 18 Apr 2025 22:17)  Source: Blood Blood  Final Report:    No growth at 5 days    Culture - Blood (collected 28 Mar 2025 20:45)  Source: Blood Blood-Peripheral  Final Report:    No growth at 5 days    Culture - Blood (collected 31 Dec 2024 15:45)  Source: .Blood BLOOD  Final Report:    No growth at 5 days    Culture - Blood (collected 03 Sep 2023 12:50)  Source: .Blood Blood  Final Report:    No growth at 5 days      HIV-1/2 Combo Result: Nonreact (06-16-25 @ 06:28)  HIV-1/2 Combo Result: Nonreact (01-04-25 @ 05:58)                    Fungitell: 108 pg/mL (06-16 @ 06:28)  Aspergillus Galactomannan Antigen: 0.06 Index (06-15 @ 06:45)    COVID-19 PCR: NotDetec (05-15-25 @ 09:50)      Procalcitonin: 1.49 (06-14)          Lactate Dehydrogenase, Serum: 375 (06-15)      Troponin T, High Sensitivity Result: 121 (06-15)  Troponin T, High Sensitivity Result: 141 (06-14)  Troponin T, High Sensitivity Result: 134 (06-14)  Troponin T, High Sensitivity Result: 137 (06-14)  Troponin T, High Sensitivity Result: 121 (06-14)  Troponin T, High Sensitivity Result: 113 (06-14)    Blood Gas Arterial, Lactate: 0.6 (06-20 @ 04:27)  Blood Gas Arterial, Lactate: 0.5 (06-19 @ 16:25)  Blood Gas Arterial, Lactate: 0.6 (06-19 @ 05:50)  Blood Gas Arterial, Lactate: 0.6 (06-18 @ 10:27)        RADIOLOGY & ADDITIONAL TESTS:  I have personally reviewed the relevant images.   CXR      CT  CT Chest No Cont:   ACC: [id number] EXAM:  CT CHEST   ORDERED BY: SUMEET MEJIA     PROCEDURE DATE:  06/19/2025          INTERPRETATION:  Indication::Fevers    Technique: Non contrast CT scan of the thorax was performed from lung   apices to adrenal glands.  Sagittal and coronal reformatted images were generated.    Comparison: CT chest-6/2/2025    CT abdomen and pelvis  performed on the same day, see separate report.  Findings:    Tubes/Lines/Devices : Left ICD stable position. NG tube passed into   distal stomach.ET tube satisfactory position.  Mediastinum/hilum: .Likely reactive mediastinal lymph nodes  Chest Wall/ Breasts: Unremarkable   Heart/Great Vessels:Poststernotomy. Stable cardiomegaly. No pericardial   effusions. Stable cardiomegaly. Numerous surgical clips EG junction.  Abdomen: See separate report  Bones and soft tissues: Degenerative changes thoracic spine  Lungs, Pleura, and Airways: Patent central airways. Interval decrease in   volume of bilateral pleural effusions now moderate size with adjacent   lower lobe compressive atelectasis. Previous patchy opacities in the   upper lobes have resolved. No pneumothorax    No suspicious pulmonary nodules or pneumothorax.  IMPRESSION:    Since  6/2/2025.      Interval decrease in volume of bilateral pleural effusions, now moderate   size with adjacent lower lobe compressive atelectasis.     Previous patchy opacities in the upper lobes have resolved. No   pneumothorax    No suspicious pulmonary nodules or pneumothorax.    Support devices, in satisfactory position.    CT abdomen and pelvis  performed on the same day, see separate report.      --- End of Report ---            JAZLYN LO MD; Attending Radiologist  This document has been electronically signed. Jun 19 2025  8:46PM (06-19-25 @ 16:13)      < from: CT Angio Abdomen and Pelvis w/ IV Cont (06.19.25 @ 16:14) >  IMPRESSION:    There is no evidence of active GI bleed however due to large amount of   hyperdense material within the cecum GI bleed in this location is limited   in evaluation.    Splenic infarct    There is mild sigmoid and descending colon wall thickening with   pericolonic inflammatory changes; possibly colitis    Small abdominal pelvic ascites    Question of indeterminate pancreatic body hypodensity measuring 1 cm on   image 31 series 301.    Small to moderate bilateral pleural effusions with associated opacities.   See chest CT report for chest findings..    --- End of Report ---      < end of copied text >    WEIGHT  Weight (kg): 83.3 (06-14-25 @ 08:00)  Creatinine: 1.5 mg/dL (06-20-25 @ 06:16)      All available historical records have been reviewed

## 2025-06-20 NOTE — PROGRESS NOTE ADULT - ASSESSMENT
70 year old male with past medical history of  COPD (on 2L home O2), DM 2  HFrEF 30%, CAD,  ischemic cardiomyopathy s/p CRT-D, HTN, paroxysmal A-fib (status post ablation 1/14/25) on amiodarone and  Eliquis who presented to ED w c/o LESLIE and b/l  leg swelling.     Impression:  #SOB/LESLIE: Multifactorial  #HFrEF (EF 30%, s/p CRT-D)  #NSVT  #CAD s/p PCI to RCA and LAD  #NSTEMI/ Hx of frequent NSVT   #Paroxysmal AFib on Eliquis  #suspected GI bleed   #Diarrhea and Fever      Plan:  - Currently intubated, mechanically ventilated, and sedated  - decreaseed Bumex 1mg IV od  - correct electrolyte and monitor tele for NSVTS  - Echo 6/16: EF 31% stable compared to old   - Check BMP daily and monitor renal function. Maintain K >4.0, Mg >2.2    - Strict intake and output, maintain negative balance for now  - pBNP 2894. Repeat closer to discharge  - Restart home GDMT for HFrEF, when stable/medically appropriate   - For Afib - c/w AC, BB and amiodarone   - Pt needs ischemic eval, but has refused in the past   - f/u ID for abx recs   - Resumed AC, hg trending down, BUN trending up, C diff negative, f/u CT abdo/pelv, f/u GI

## 2025-06-20 NOTE — PROVIDER CONTACT NOTE (OTHER) - SITUATION
PT on elaquist. Blood noted in stool. H/H dropped.
respiratory distress and episodes of non sustained  v tach

## 2025-06-20 NOTE — PROGRESS NOTE ADULT - SUBJECTIVE AND OBJECTIVE BOX
Patient is a 70y old  Male who presents with a chief complaint of SOB (2025 11:57)    HPI:   Patient is a  70 year old male with past medical history of  COPD (on 2L home O2), DM 2  HFrEF 30%, CAD,  ischemic cardiomyopathy s/p CRT-D, HTN, paroxysmal A-fib (status post ablation 25) on amiodarone and  Eliquis who presented to ED w c/o LESLIE and b/l  leg swelling.  Patient said it started 2 days ago and felt like his previous heart failure exacerbations. Patient denied n/v/f/c; denied HA lightheadedness; denied palpitations cough CP (now resolved on admission).  (2025 02:21)     Hospital Day: MEDICATIONS  (STANDING):  aMIOdarone    Tablet 200 milliGRAM(s) Oral daily  apixaban 5 milliGRAM(s) Enteral Tube every 12 hours  atorvastatin 40 milliGRAM(s) Oral at bedtime  bumetanide Injectable 1 milliGRAM(s) IV Push daily  chlorhexidine 0.12% Liquid 15 milliLiter(s) Oral Mucosa every 12 hours  chlorhexidine 2% Cloths 1 Application(s) Topical <User Schedule>  clopidogrel Tablet 75 milliGRAM(s) Enteral Tube daily  dexMEDEtomidine Infusion 0.05 MICROgram(s)/kG/Hr (1.04 mL/Hr) IV Continuous <Continuous>  dextrose 5%. 1000 milliLiter(s) (100 mL/Hr) IV Continuous <Continuous>  dextrose 5%. 1000 milliLiter(s) (50 mL/Hr) IV Continuous <Continuous>  dextrose 50% Injectable 25 Gram(s) IV Push once  dextrose 50% Injectable 12.5 Gram(s) IV Push once  dextrose 50% Injectable 25 Gram(s) IV Push once  ezetimibe 10 milliGRAM(s) Oral daily  fentaNYL   Infusion. 0.5 MICROgram(s)/kG/Hr (4.17 mL/Hr) IV Continuous <Continuous>  glucagon  Injectable 1 milliGRAM(s) IntraMuscular once  insulin glargine Injectable (LANTUS) 34 Unit(s) SubCutaneous at bedtime  insulin lispro (ADMELOG) corrective regimen sliding scale   SubCutaneous every 6 hours  midodrine 10 milliGRAM(s) Oral every 8 hours  pantoprazole  Injectable 40 milliGRAM(s) IV Push two times a day  propofol Infusion 5 MICROgram(s)/kG/Min (2.54 mL/Hr) IV Continuous <Continuous>      INTERVAL HPI/OVERNIGHT EVENTS:   No overnight events   Afebrile, hemodynamically stable     Subjective:    ICU Vital Signs Last 24 Hrs  T(C): 38.1 (2025 12:20), Max: 38.4 (2025 20:00)  T(F): 100.6 (2025 12:20), Max: 101.1 (2025 20:00)  HR: 96 (:20) (63 - 124)  BP: 205/95 (2025 13:00) (95/50 - 205/95)  BP(mean): 136 (:00) (67 - 148)  ABP: --  ABP(mean): --  RR: 31 (2025 13:00) (19 - 39)  SpO2: 100% (2025 13:20) (98% - 100%)    O2 Parameters below as of 2025 22:00  Patient On (Oxygen Delivery Method): ventilator    O2 Concentration (%): 40      I&O's Summary    2025 07:01  -  2025 07:00  --------------------------------------------------------  IN: 1732 mL / OUT: 4555 mL / NET: -2823 mL    2025 07:01  -  2025 14:24  --------------------------------------------------------  IN: 0 mL / OUT: 375 mL / NET: -375 mL      Mode: AC/ CMV (Assist Control/ Continuous Mandatory Ventilation)  RR (machine): 22  TV (machine): 400  FiO2: 40  PEEP: 8  MAP: 8  PIP: 16      Daily     Daily Weight in k.4 (2025 03:00)    Adult Advanced Hemodynamics Last 24 Hrs  CVP(mm Hg): --  CVP(cm H2O): --  CO: --  CI: --  PA: --  PA(mean): --  PCWP: --  SVR: --  SVRI: --  PVR: --  PVRI: --    EKG/Telemetry Events:    MEDICATIONS  (STANDING):  aMIOdarone    Tablet 200 milliGRAM(s) Oral daily  apixaban 5 milliGRAM(s) Enteral Tube every 12 hours  atorvastatin 40 milliGRAM(s) Oral at bedtime  bumetanide Injectable 1 milliGRAM(s) IV Push daily  chlorhexidine 0.12% Liquid 15 milliLiter(s) Oral Mucosa every 12 hours  chlorhexidine 2% Cloths 1 Application(s) Topical <User Schedule>  clopidogrel Tablet 75 milliGRAM(s) Enteral Tube daily  dexMEDEtomidine Infusion 0.05 MICROgram(s)/kG/Hr (1.04 mL/Hr) IV Continuous <Continuous>  dextrose 5%. 1000 milliLiter(s) (100 mL/Hr) IV Continuous <Continuous>  dextrose 5%. 1000 milliLiter(s) (50 mL/Hr) IV Continuous <Continuous>  dextrose 50% Injectable 25 Gram(s) IV Push once  dextrose 50% Injectable 12.5 Gram(s) IV Push once  dextrose 50% Injectable 25 Gram(s) IV Push once  ezetimibe 10 milliGRAM(s) Oral daily  fentaNYL   Infusion. 0.5 MICROgram(s)/kG/Hr (4.17 mL/Hr) IV Continuous <Continuous>  glucagon  Injectable 1 milliGRAM(s) IntraMuscular once  insulin glargine Injectable (LANTUS) 34 Unit(s) SubCutaneous at bedtime  insulin lispro (ADMELOG) corrective regimen sliding scale   SubCutaneous every 6 hours  midodrine 10 milliGRAM(s) Oral every 8 hours  pantoprazole  Injectable 40 milliGRAM(s) IV Push two times a day  propofol Infusion 5 MICROgram(s)/kG/Min (2.54 mL/Hr) IV Continuous <Continuous>    MEDICATIONS  (PRN):  acetaminophen     Tablet .. 650 milliGRAM(s) Oral every 6 hours PRN Temp greater or equal to 38C (100.4F), Mild Pain (1 - 3)  albuterol/ipratropium for Nebulization 3 milliLiter(s) Nebulizer every 6 hours PRN Shortness of Breath and/or Wheezing  aluminum hydroxide/magnesium hydroxide/simethicone Suspension 30 milliLiter(s) Oral every 4 hours PRN Dyspepsia  dextrose Oral Gel 15 Gram(s) Oral once PRN Blood Glucose LESS THAN 70 milliGRAM(s)/deciliter  guaiFENesin Oral Liquid (Sugar-Free) 200 milliGRAM(s) Oral every 6 hours PRN Cough  melatonin 5 milliGRAM(s) Oral at bedtime PRN Insomnia  ondansetron Injectable 4 milliGRAM(s) IV Push every 8 hours PRN Nausea and/or Vomiting      PHYSICAL EXAM:  GENERAL: intubated sedated obese mild ovrload  HEAD:  Atraumatic, Normocephalic  EYES: EOMI, PERRLA, conjunctiva and sclera clear  NECK: Supple, No JVD, Normal thyroid, no enlarged nodes   CHEST/LUNG: B/L good air entry; No rales, rhonchi, or wheezing  HEART: S1S2 normal, no S3, Regular rate and rhythm; No murmurs  ABDOMEN: Soft, Nontender, Nondistended; Bowel sounds present  EXTREMITIES:  2+ Peripheral Pulses, No clubbing, cyanosis, ann marie 2+ edema  LYMPH: No lymphadenopathy noted  SKIN: No rashes or lesions    LABS:                        9.6    12.36 )-----------( 216      ( 2025 06:16 )             31.6     06-20    147[H]  |  109  |  56[H]  ----------------------------<  189[H]  4.3   |  26  |  1.5    Ca    8.6      2025 06:16  Mg     2.1     06-20    TPro  5.4[L]  /  Alb  2.8[L]  /  TBili  0.4  /  DBili  x   /  AST  12  /  ALT  9   /  AlkPhos  58  06-20    LIVER FUNCTIONS - ( 2025 06:16 )  Alb: 2.8 g/dL / Pro: 5.4 g/dL / ALK PHOS: 58 U/L / ALT: 9 U/L / AST: 12 U/L / GGT: x             CAPILLARY BLOOD GLUCOSE      POCT Blood Glucose.: 177 mg/dL (2025 12:05)  POCT Blood Glucose.: 190 mg/dL (2025 05:24)  POCT Blood Glucose.: 170 mg/dL (2025 00:05)  POCT Blood Glucose.: 195 mg/dL (2025 22:19)  POCT Blood Glucose.: 139 mg/dL (2025 18:59)    ABG - ( 2025 04:27 )  pH, Arterial: 7.37  pH, Blood: x     /  pCO2: 51    /  pO2: 134   / HCO3: 30    / Base Excess: 3.4   /  SaO2: 99.3                    Urinalysis Basic - ( 2025 06:16 )    Color: x / Appearance: x / SG: x / pH: x  Gluc: 189 mg/dL / Ketone: x  / Bili: x / Urobili: x   Blood: x / Protein: x / Nitrite: x   Leuk Esterase: x / RBC: x / WBC x   Sq Epi: x / Non Sq Epi: x / Bacteria: x          RADIOLOGY & ADDITIONAL TESTS:  CXR:        Care Discussed with Consultants/Other Providers [ x] YES  [ ] NO

## 2025-06-20 NOTE — PROGRESS NOTE ADULT - SUBJECTIVE AND OBJECTIVE BOX
70yMale  Being followed for diarrhea, anemia   Interval history:  No hematemesis, melena, blood in stool reported.       PAST MEDICAL & SURGICAL HISTORY:   CHF (congestive heart failure)  Diabetes  CAD (coronary artery disease)  Chronic obstructive pulmonary disease (COPD)  HTN (hypertension)  Stented coronary artery  Dyslipidemia  GERD (gastroesophageal reflux disease)  Afib  CVA (cerebral vascular accident)  H/O heart artery stent  Heart valve replaced  aortic  History of Nissen fundoplication                Social History: No smoking. No alcohol. No illegal drug use.            MEDICATIONS  (STANDING):  aMIOdarone    Tablet 200 milliGRAM(s) Oral daily  apixaban 5 milliGRAM(s) Enteral Tube every 12 hours  atorvastatin 40 milliGRAM(s) Oral at bedtime  bumetanide Injectable 1 milliGRAM(s) IV Push daily  chlorhexidine 0.12% Liquid 15 milliLiter(s) Oral Mucosa every 12 hours  chlorhexidine 2% Cloths 1 Application(s) Topical <User Schedule>  clopidogrel Tablet 75 milliGRAM(s) Enteral Tube daily  dexMEDEtomidine Infusion 0.05 MICROgram(s)/kG/Hr (1.04 mL/Hr) IV Continuous <Continuous>  dextrose 5%. 1000 milliLiter(s) (100 mL/Hr) IV Continuous <Continuous>  dextrose 5%. 1000 milliLiter(s) (50 mL/Hr) IV Continuous <Continuous>  dextrose 50% Injectable 25 Gram(s) IV Push once  dextrose 50% Injectable 12.5 Gram(s) IV Push once  dextrose 50% Injectable 25 Gram(s) IV Push once  ezetimibe 10 milliGRAM(s) Oral daily  fentaNYL   Infusion. 0.5 MICROgram(s)/kG/Hr (4.17 mL/Hr) IV Continuous <Continuous>  glucagon  Injectable 1 milliGRAM(s) IntraMuscular once  insulin glargine Injectable (LANTUS) 34 Unit(s) SubCutaneous at bedtime  insulin lispro (ADMELOG) corrective regimen sliding scale   SubCutaneous every 6 hours  midodrine 10 milliGRAM(s) Oral every 8 hours  pantoprazole  Injectable 40 milliGRAM(s) IV Push two times a day  propofol Infusion 5 MICROgram(s)/kG/Min (2.54 mL/Hr) IV Continuous <Continuous>    MEDICATIONS  (PRN):  acetaminophen     Tablet .. 650 milliGRAM(s) Oral every 6 hours PRN Temp greater or equal to 38C (100.4F), Mild Pain (1 - 3)  albuterol/ipratropium for Nebulization 3 milliLiter(s) Nebulizer every 6 hours PRN Shortness of Breath and/or Wheezing  aluminum hydroxide/magnesium hydroxide/simethicone Suspension 30 milliLiter(s) Oral every 4 hours PRN Dyspepsia  dextrose Oral Gel 15 Gram(s) Oral once PRN Blood Glucose LESS THAN 70 milliGRAM(s)/deciliter  guaiFENesin Oral Liquid (Sugar-Free) 200 milliGRAM(s) Oral every 6 hours PRN Cough  melatonin 5 milliGRAM(s) Oral at bedtime PRN Insomnia  ondansetron Injectable 4 milliGRAM(s) IV Push every 8 hours PRN Nausea and/or Vomiting      Allergies:   Bactrim (Unknown)  sulfa drugs (Unknown)  Intolerances          REVIEW OF SYSTEMS:  unobtainable         VITAL SIGNS:   T(F): 100.4 (06-20-25 @ 07:20), Max: 101.1 (06-19-25 @ 20:00)  HR: 81 (06-20-25 @ 08:00) (63 - 100)  BP: 101/59 (06-20-25 @ 08:00) (97/53 - 179/76)  RR: 20 (06-20-25 @ 08:00) (19 - 32)  SpO2: 100% (06-20-25 @ 08:00) (99% - 100%)    PHYSICAL EXAM:  GENERAL: +intubated  HEAD:  Atraumatic, Normocephalic  EYES: conjunctiva and sclera clear  NECK: Supple, no thyromegaly  CHEST/LUNG: b/l rhonchi  HEART: Regular rate and rhythm; normal S1, S2, No murmurs.  ABDOMEN: Soft, nontender, nondistended; Bowel sounds present  NEUROLOGY: No asterixis or tremor.   SKIN: Intact, no jaundice            LABS:                        9.6    12.36 )-----------( 216      ( 20 Jun 2025 06:16 )             31.6     06-20    147[H]  |  109  |  56[H]  ----------------------------<  189[H]  4.3   |  26  |  1.5    Ca    8.6      20 Jun 2025 06:16  Mg     2.1     06-20    TPro  5.4[L]  /  Alb  2.8[L]  /  TBili  0.4  /  DBili  x   /  AST  12  /  ALT  9   /  AlkPhos  58  06-20    LIVER FUNCTIONS - ( 20 Jun 2025 06:16 )  Alb: 2.8 g/dL / Pro: 5.4 g/dL / ALK PHOS: 58 U/L / ALT: 9 U/L / AST: 12 U/L / GGT: x           PT/INR - ( 18 Jun 2025 13:40 )   PT: 19.00 sec;   INR: 1.59 ratio         PTT - ( 18 Jun 2025 13:40 )  PTT:29.0 sec    IMAGING:    < from: CT Angio Abdomen and Pelvis w/ IV Cont (06.19.25 @ 16:14) >    ACC: [id number] EXAM:  CT ANGIO ABD PELV (W)AW IC   ORDERED BY: SUMEET MEJIA     PROCEDURE DATE:  06/19/2025          INTERPRETATION:  CLINICAL STATEMENT: Retroperitoneal bleed    TECHNIQUE: Contiguous axial CT images were obtained from the lower chest   to the pubic symphysis utilizing a CT angiogram protocol. Precontrast   images were obtained through the abdomen and pelvis then, CT angiogram   was performed with 95 cc of Omnipaque 350 intravenous contrast. Delayed   images were obtained as well..  95 cc administered of Omnipaque 350 (5 cc   discarded).  Oral contrast was not given.  Reformatted images in the   coronal and sagittal planes were acquired. MIP images were acquired.    COMPARISON CT: 1/3/2025    Study is severely limited in evaluation due to artifact caused by   overlying arms and motion artifact    FINDINGS:    LOWER CHEST: Cardiac pacemaker, partially imaged. Small to moderate   bilateral pleural effusions with associated opacities. See chest CT   report for chest findings..    HEPATOBILIARY: Fundal gallbladder wall thickening, which can be seen in   adenomyomatosis. Small amount of inferior perihepatic ascites.    SPLEEN: Linear hypodensity is noted in the lateral spleen on image 26 of   series 309, likely representing a splenic infarct. Portions of the spleen   are limited in evaluation due to streak artifact from adjacent hardware.    PANCREAS: Question of indeterminate pancreatic body hypodensity measuring   1 cm on image 31 series 301.    ADRENAL GLANDS: Stable left adrenal nodule measuring 2.5 cm, limited in   evaluation due to artifact, stable in size since abdominal pelvic CT of   1/21/2016. The right adrenal gland is unremarkable.    KIDNEYS: Subcentimeter hypodensities are too small to characterize.    Subcentimeter left renal calculi may be vascular. There is no   hydronephrosis.    ABDOMINOPELVIC NODES: Unremarkable...    PELVIC ORGANS: The bladder is collapsed by a Mcmahon catheter. Small amount   of pelvic ascites.    PERITONEUM/MESENTERY/BOWEL: Anterior abdominal wall postsurgical change.   Postsurgical changes at the gastroesophageal junction, limits evaluation   of adjacent structures. Nasogastric tube is present in the duodenum. A   rectal tube is present. There is a nonspecific presacral edema. There is   mild sigmoid and descending colon wall thickening with pericolonic   inflammatory changes. There is no evidence of active GI bleed however due   to large amount of hyperdense material within the cecum GI bleed in this   location is limited in evaluation. There is no free air or obstruction..    BONES: Stable compression deformity of L2. Degenerative change...    OTHER: The celiac artery, superior mesenteric artery, inferior mesenteric   artery, left renal artery, and what appears to be 2 right renal arteries   are patent. There are diffuse vascular calcifications in the abdominal   aorta is normal caliber. 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    IMPRESSION:    There is no evidence of active GI bleed however due to large amount of   hyperdense material within the cecum GI bleed in this location is limited   in evaluation.    Splenic infarct    There is mild sigmoid and descending colon wall thickening with   pericolonic inflammatory changes; possibly colitis    Small abdominal pelvic ascites    Question of indeterminate pancreatic body hypodensity measuring 1 cm on   image 31 series 301.    Small to moderate bilateral pleural effusions with associated opacities.   See chest CT report for chest findings..    --- End of Report ---            LAURA GARCIA MD; Attending Radiologist  This document has been electronically signed. Jun 20 2025 10:47AM    < end of copied text >

## 2025-06-20 NOTE — PROGRESS NOTE ADULT - SUBJECTIVE AND OBJECTIVE BOX
Chief complaint: Patient is a 70y old  Male who presents with a chief complaint of SOB (18 Jun 2025 09:13)    Interval history:  Patient remains intubated and sedative. on bumex 1mg BID   recorded of 4.5L UO With net negative 2.8L       Past medical history is notable for:  Diabetes  CAD (coronary artery disease) Stented coronary artery  Chronic obstructive pulmonary disease (COPD)  HTN (hypertension)  Dyslipidemia  GERD (gastroesophageal reflux disease)  Afib  CVA (cerebral vascular accident)  Acute on chronic HFrEF (heart failure with reduced ejection fraction)  Acute respiratory failure with hypoxia        Review of systems: a complete 10- point review of systems was obtained and is negative except as stated in the interval history.    Vitals:  ICU Vital Signs Last 24 Hrs  T(C): 38 (20 Jun 2025 07:20), Max: 38.4 (19 Jun 2025 20:00)  T(F): 100.4 (20 Jun 2025 07:20), Max: 101.1 (19 Jun 2025 20:00)  HR: 84 (20 Jun 2025 11:53) (63 - 100)  BP: 138/68 (20 Jun 2025 11:10) (95/50 - 179/76)  BP(mean): 96 (20 Jun 2025 11:10) (67 - 111)    RR: 25 (20 Jun 2025 11:10) (19 - 32)  SpO2: 100% (20 Jun 2025 11:53) (99% - 100%)    O2 Parameters below as of 19 Jun 2025 22:00  Patient On (Oxygen Delivery Method): ventilator    O2 Concentration (%): 40    Weight trend:  Weight (kg): 83.3 (06-14)    Physical exam:  General: intubated and sedated   Cardiac: Regular rate and rhythm. No murmurs, rubs, or gallops.   Vascular: Symmetric radial pulses. Dorsalis pedis pulses palpable.   Respiratory: intubated   Abdomen: Soft, nontender. Audible bowel sounds.   Extremities: Warm with trace  edema. No cyanosis or clubbing.       Data reviewed:  - Telemetry:  - ECG < from: 12 Lead ECG (06.14.25 @ 04:54) >    Ventricular Rate 115 BPM    Atrial Rate 115 BPM    P-R Interval 112 ms    QRS Duration 138 ms    Q-T Interval 364 ms    QTC Calculation(Bazett) 503 ms    P Axis 31 degrees    R Axis 244 degrees    T Axis 53 degrees    Diagnosis Line Atrial-sensed ventricular-paced rhythm  Biventricular pacemaker detected  Abnormal ECG    < end of copied text >    - Echo (< from: TTE Echo Complete w/o Contrast w/ Doppler (06.16.25 @ 10:37) >    CONCLUSIONS:     1. Left ventricular systolic function is moderately decreased with an   ejection fraction of 31 % by Beck's method of disks.   2. Severe left ventricular hypertrophy.   3. There is severe (grade 3) left ventricular diastolic dysfunction.   4. Normal right ventricular cavity size, with normal wall thickness, and   normal right ventricular systolic function. Tricuspid annular plane   systolic excursion (TAPSE) is 1.5 cm (normal >=1.7 cm).   5. Device lead is visualized in the right heart.   6. Left atrium is moderately dilated.   7. A bioprosthetic valve replacement valve replacement is present in the   aortic position The prosthetic valve is well seated. Trace intravalvular   regurgitation.   8. Trace mitral regurgitation at a blood pressure of 119/75 mmHg.   9. Mild tricuspid regurgitation.  10. Estimated pulmonary artery systolic pressure is 44 mmHg, consistent   with mild pulmonary hypertension.  11. No pericardial effusion seen.    < end of copied text >    - Radiology:  < from: Xray Chest 1 View- PORTABLE-Routine (Xray Chest 1 View- PORTABLE-Routine in AM.) (06.17.25 @ 07:21) >  MPRESSION:    Mild improvement in right basilar opacity. Stable left lung opacities.    --- End of Report ---    < end of copied text >    - Labs:       - Telemetry:  - ECG (date***):  - Echo (date***):  - Radiology:    - Labs:                        9.6    12.36 )-----------( 216      ( 20 Jun 2025 06:16 )             31.6     06-20    147[H]  |  109  |  56[H]  ----------------------------<  189[H]  4.3   |  26  |  1.5    Ca    8.6      20 Jun 2025 06:16  Mg     2.1     06-20    TPro  5.4[L]  /  Alb  2.8[L]  /  TBili  0.4  /  DBili  x   /  AST  12  /  ALT  9   /  AlkPhos  58  06-20    LIVER FUNCTIONS - ( 20 Jun 2025 06:16 )  Alb: 2.8 g/dL / Pro: 5.4 g/dL / ALK PHOS: 58 U/L / ALT: 9 U/L / AST: 12 U/L / GGT: x           PT/INR - ( 18 Jun 2025 13:40 )   PT: 19.00 sec;   INR: 1.59 ratio         PTT - ( 18 Jun 2025 13:40 )  PTT:29.0 sec        ABG - ( 20 Jun 2025 04:27 )  pH, Arterial: 7.37  pH, Blood: x     /  pCO2: 51    /  pO2: 134   / HCO3: 30    / Base Excess: 3.4   /  SaO2: 99.3        Lactate Trend    Urinalysis Basic - ( 20 Jun 2025 06:16 )    Color: x / Appearance: x / SG: x / pH: x  Gluc: 189 mg/dL / Ketone: x  / Bili: x / Urobili: x   Blood: x / Protein: x / Nitrite: x   Leuk Esterase: x / RBC: x / WBC x   Sq Epi: x / Non Sq Epi: x / Bacteria: x                   - Cultures:    Culture - Blood (collected 06-16)  Source: Blood Blood  Preliminary Report:    No growth at 24 hours    Culture - Sputum (collected 06-15)  Source: Sputum Sputum  Gram Stain:    Rare polymorphonuclear leukocytes per low power field    No Squamous epithelial cells per low power field    Rare Yeast like cells seen per oil power field    Rare Gram Positive Cocci in Clusters seen per oil power field  Final Report:    Commensal magda consistent with body site    Culture - Blood (collected 06-14)  Source: Blood None  Preliminary Report:    No growth at 72 Hours      Medications:  aMIOdarone    Tablet 200 milliGRAM(s) Oral daily  apixaban 5 milliGRAM(s) Enteral Tube every 12 hours  atorvastatin 40 milliGRAM(s) Oral at bedtime  bumetanide Injectable 1 milliGRAM(s) IV Push two times a day  cefepime   IVPB 2000 milliGRAM(s) IV Intermittent every 12 hours  chlorhexidine 0.12% Liquid 15 milliLiter(s) Oral Mucosa every 12 hours  chlorhexidine 2% Cloths 1 Application(s) Topical <User Schedule>  clopidogrel Tablet 75 milliGRAM(s) Enteral Tube daily  dextrose 50% Injectable 25 Gram(s) IV Push once  dextrose 50% Injectable 12.5 Gram(s) IV Push once  dextrose 50% Injectable 25 Gram(s) IV Push once  ezetimibe 10 milliGRAM(s) Oral daily  glucagon  Injectable 1 milliGRAM(s) IntraMuscular once  insulin glargine Injectable (LANTUS) 34 Unit(s) SubCutaneous at bedtime  insulin lispro (ADMELOG) corrective regimen sliding scale   SubCutaneous every 6 hours  midodrine 10 milliGRAM(s) Oral every 8 hours  mupirocin 2% Nasal 1 Application(s) Both Nostrils every 12 hours  pantoprazole  Injectable 40 milliGRAM(s) IV Push two times a day  vancomycin  IVPB 750 milliGRAM(s) IV Intermittent every 24 hours    Drips:  dexMEDEtomidine Infusion 0.05 MICROgram(s)/kG/Hr (1.04 mL/Hr) IV Continuous <Continuous>  dextrose 5%. 1000 milliLiter(s) (100 mL/Hr) IV Continuous <Continuous>  dextrose 5%. 1000 milliLiter(s) (50 mL/Hr) IV Continuous <Continuous>  norepinephrine Infusion 0.05 MICROgram(s)/kG/Min (7.92 mL/Hr) IV Continuous <Continuous>  propofol Infusion 5 MICROgram(s)/kG/Min (2.54 mL/Hr) IV Continuous <Continuous>    PRN:

## 2025-06-20 NOTE — PROGRESS NOTE ADULT - SUBJECTIVE AND OBJECTIVE BOX
Patient seen and evaluated this am, sedated on ventilator with fentanyl and Precedex      T(F): 100.4 (06-20-25 @ 07:20), Max: 101.1 (06-19-25 @ 20:00)  HR: 81 (06-20-25 @ 08:00)  BP: 101/59 (06-20-25 @ 08:00)  RR: 20 (06-20-25 @ 08:00)  SpO2: 100% (06-20-25 @ 08:00) (99% - 100%)    PHYSICAL EXAM:  GENERAL: NAD  HEAD:  Atraumatic, Normocephalic  EYES: EOMI, PERRLA, conjunctiva and sclera clear  NERVOUS SYSTEM: no focal deficits   CHEST/LUNG:  bilateral rhonchi  HEART: Regular rate and rhythm; No murmurs, rubs, or gallops  ABDOMEN: Soft, Nontender, Nondistended; Bowel sounds present  EXTREMITIES:  2+ Peripheral Pulses, No clubbing, cyanosis, or edema    LABS  06-20    147[H]  |  109  |  56[H]  ----------------------------<  189[H]  4.3   |  26  |  1.5    Ca    8.6      20 Jun 2025 06:16  Mg     2.1     06-20    TPro  5.4[L]  /  Alb  2.8[L]  /  TBili  0.4  /  DBili  x   /  AST  12  /  ALT  9   /  AlkPhos  58  06-20                          9.6    12.36 )-----------( 216      ( 20 Jun 2025 06:16 )             31.6     PT/INR - ( 18 Jun 2025 13:40 )   PT: 19.00 sec;   INR: 1.59 ratio         PTT - ( 18 Jun 2025 13:40 )  PTT:29.0 sec    Mode: AC/ CMV (Assist Control/ Continuous Mandatory Ventilation)  RR (machine): 20  TV (machine): 400  FiO2: 40  PEEP: 8      Culture Results:   No growth at 72 Hours (06-16-25)  Culture Results:   Commensal magda consistent with body site (06-15-25)  Culture Results:   No growth at 5 days (06-14-25)  Culture Results:   No growth at 5 days (06-02-25)  Culture Results:   No growth at 5 days (06-02-25)    RADIOLOGY  < from: CT Chest No Cont (06.19.25 @ 16:13) >  IMPRESSION:    Since  6/2/2025.      Interval decrease in volume of bilateral pleural effusions, now moderate   size with adjacent lower lobe compressive atelectasis.     Previous patchy opacities in the upper lobes have resolved. No   pneumothorax    No suspicious pulmonary nodules or pneumothorax.    < end of copied text >    MEDICATIONS  (STANDING):  aMIOdarone    Tablet 200 milliGRAM(s) Oral daily  apixaban 5 milliGRAM(s) Enteral Tube every 12 hours  atorvastatin 40 milliGRAM(s) Oral at bedtime  bumetanide Injectable 1 milliGRAM(s) IV Push two times a day  cefepime   IVPB 2000 milliGRAM(s) IV Intermittent every 12 hours  chlorhexidine 0.12% Liquid 15 milliLiter(s) Oral Mucosa every 12 hours  chlorhexidine 2% Cloths 1 Application(s) Topical <User Schedule>  clopidogrel Tablet 75 milliGRAM(s) Enteral Tube daily  dexMEDEtomidine Infusion 0.05 MICROgram(s)/kG/Hr (1.04 mL/Hr) IV Continuous <Continuous>  ezetimibe 10 milliGRAM(s) Oral daily  fentaNYL   Infusion. 0.5 MICROgram(s)/kG/Hr (4.17 mL/Hr) IV Continuous <Continuous>  insulin glargine Injectable (LANTUS) 34 Unit(s) SubCutaneous at bedtime  insulin lispro (ADMELOG) corrective regimen sliding scale   SubCutaneous every 6 hours  midodrine 10 milliGRAM(s) Oral every 8 hours  pantoprazole  Injectable 40 milliGRAM(s) IV Push two times a day  propofol Infusion 5 MICROgram(s)/kG/Min (2.54 mL/Hr) IV Continuous <Continuous>  vancomycin  IVPB 1000 milliGRAM(s) IV Intermittent every 24 hours    MEDICATIONS  (PRN):  acetaminophen     Tablet .. 650 milliGRAM(s) Oral every 6 hours PRN Temp greater or equal to 38C (100.4F), Mild Pain (1 - 3)  albuterol/ipratropium for Nebulization 3 milliLiter(s) Nebulizer every 6 hours PRN Shortness of Breath and/or Wheezing  aluminum hydroxide/magnesium hydroxide/simethicone Suspension 30 milliLiter(s) Oral every 4 hours PRN Dyspepsia  dextrose Oral Gel 15 Gram(s) Oral once PRN Blood Glucose LESS THAN 70 milliGRAM(s)/deciliter  guaiFENesin Oral Liquid (Sugar-Free) 200 milliGRAM(s) Oral every 6 hours PRN Cough  melatonin 5 milliGRAM(s) Oral at bedtime PRN Insomnia  ondansetron Injectable 4 milliGRAM(s) IV Push every 8 hours PRN Nausea and/or Vomiting        Pending/Follow up items (tests, labs, procedures,consults):    Indication for continued inpatient management:    Goals of Care note:     Family contact:  Dispo (home, SNF, etc):    Prepare for DC order [x] - updated MARLENY is:   DC provider note prep:    -------------------------------Time spent-----------------------------------------------------------------------  Initial visit:             mins [ ] -- 55-74 mins [ ]  Subsequent visit: 50-79 mins[ ]    -- 35-49mins [ ]     --------------------------------# and complexity of problems---------------------------------------------  [ ] chronic illness with severe exacerbation , progression, treatment side effect  [ ] acute or chronic illness with threat to life or bodily funtion                         --------------------------------Complexity of data reviewed ----------------------------------------------  The Patients complexity of data reviewed  is Low [ ] Moderate [ ] High [ ] due to the following:   A:      Reviewed prior external records [ ]      Considering/ Ordered a unique test:  Labs [x ] Imaging [x ] Stress Test  [ ] Other: Specify [ ]      Reviewed each unique test result [x ]      Assessment requiring an independent historian [ ]  B:       Independent interpretation of:   X-Ray [x ] EKG [ ]  Other: Specify  [ ]  C:       Discussion of management of tests with clinician outside of my group [ ]    -------------------------------------Risk of Morbidity-------------------------------------------------------  The Patients risk of morbidity is Low [ ] Moderate [ ] High [ ] due to the following:   Mod:  Prescription Drug Management [ ]  Decision regarding elective or emergent: minor surgery [ ] major surgery [ ]  Decision regarding hospitalization or escalation of hospital-level care [ ]  SDOH: Hearing/Vision impaired [ ] Food insecurity [ ] Homelessness/unsafe condition [ ]   Financial strain [ ] un/underemployed [ ] Lack of Transport [ ]  High:  DNR or De-Escalation of care because of poor prognosis [ ]  Drug Therapy requiring intensive monitoring for toxicity [ ]  Parenteral controlled substance [ ]

## 2025-06-20 NOTE — PROGRESS NOTE ADULT - NS ATTEND AMEND GEN_ALL_CORE FT
Patient seen and examined.  Agree with above.   Volume status has improved   Can change to once daily dosing of IV bumex   Monitor I/O, Cr, lytes  Lifelong ac if no contraindications   On sapt for history of prior PCI if no contraindications      Armando Herron MD

## 2025-06-21 NOTE — PROGRESS NOTE ADULT - ASSESSMENT
IMPRESSION:    Acute on chronic HFrEF decompensated heart failure EF 31%   Anemia   Acute pulmonary edema  COPD, not in active exacerbation  pulm HTN multifactorial secondary to cardiomyopathy reduced EF, likely LAW, baseline COPD  chronic hypoxia from all the above  B/L small pleural effusions due to  pulmonary edema    PLAN:    CNS: Daily SAT, Seroquel QHS if needed for agitation     HEENT: Oral care    PULMONARY:  HOB @ 45 degrees. CXR noted w/congestion, Duonebs q 6 hrs PRN, SBT if passes SAT   cxr today   CARDIOVASCULAR: TTE noted , volume contraction as tolerated, Cardiology follow up, Bumex 1mg BId scheduled     GI: GI prophylaxis. Feeding. C. Diff GI PCR negative, trend Hb, f/u GI reccs, f/u CT abdomen    RENAL:  Follow up lytes. Correct as needed, scheduled Bumex   if NA above 145 start free water 250cc/ Q4 hrs   INFECTIOUS DISEASE: Follow up cultures, MRSA +, vancomycin and cefepime for now    HEMATOLOGICAL: On AC, resume, okay per GI, f/u Us duplex LE with no DVT, hold AC if active bleed      ENDOCRINE:  Follow up FS.  Insulin protocol if needed.    MUSCULOSKELETAL: off loading     Palliative follow up  MICU monitoring

## 2025-06-21 NOTE — PROGRESS NOTE ADULT - NS ATTEND AMEND GEN_ALL_CORE FT
Patient seen and examined.  Agree with above.   Volume status has improved and patient appears euvolemic on exam   Can hold diuretics if needed for now given mild rise in Cr and monitor I/O closely   Lifelong ac if no contraindications - monitor H/H closely     Armando Herron MD

## 2025-06-21 NOTE — PROGRESS NOTE ADULT - SUBJECTIVE AND OBJECTIVE BOX
Pt seen. Pt calm on vent    T(F): , Max: 101.8 (06-21-25 @ 08:10)  HR: 73 (06-21-25 @ 22:00) (72 - 116)  BP: 115/58 (06-21-25 @ 22:00)  RR: 22 (06-21-25 @ 22:00)  SpO2: 100% (06-21-25 @ 22:00)  IN: 2894.7 mL / OUT: 1430 mL / NET: 1464.7 mL    IN: 1823 mL / OUT: 1720 mL / NET: 103 mL      General: No apparent distress  Cardiovascular: S1, S2  Gastrointestinal: Soft, Non-tender, Non-distended  Respiratory: vented  Lymphatic: No edema  Neurologic: sedated  Dermatologic: Skin dry                          9.2    11.92 )-----------( 202      ( 21 Jun 2025 06:32 )             31.8     06-21    148[H]  |  110  |  63[HH]  ----------------------------<  329[H]  4.9   |  27  |  1.6[H]    Ca    8.5      21 Jun 2025 06:32  Phos  2.7     06-21  Mg     2.3     06-21    TPro  5.4[L]  /  Alb  2.9[L]  /  TBili  0.3  /  DBili  x   /  AST  11  /  ALT  8   /  AlkPhos  59  06-21

## 2025-06-21 NOTE — PROGRESS NOTE ADULT - ASSESSMENT
70 year old male with past medical history of  COPD (on 2L home O2), DM 2  HFrEF 30%, CAD,  ischemic cardiomyopathy s/p CRT-D, HTN, paroxysmal A-fib (status post ablation 1/14/25) on amiodarone and  Eliquis who presented to ED w c/o LESLIE and b/l  leg swelling.     #HFrEF with exacerbation  - bumex held    #NSVT  #CAD s/p PCI to RCA and LAD  #NSTEMI  #Paroxysmal AFib   - ON a/c  - cardio following    code states need clarification  family unavailable today  pt was DNR/DNI from prior MOLST recently, but did end up intubated

## 2025-06-21 NOTE — PROGRESS NOTE ADULT - SUBJECTIVE AND OBJECTIVE BOX
Subjective/Objective:     HPI-Cardiology/Events/Updates  Pt evaluated at bedside. No overnight events. Radiology tests and hospital records, were reviewed, as well as previous notes on this patient.         MEDICATIONS  (STANDING):  aMIOdarone    Tablet 200 milliGRAM(s) Oral daily  apixaban 5 milliGRAM(s) Enteral Tube every 12 hours  atorvastatin 40 milliGRAM(s) Oral at bedtime  bumetanide Injectable 1 milliGRAM(s) IV Push daily  chlorhexidine 0.12% Liquid 15 milliLiter(s) Oral Mucosa every 12 hours  chlorhexidine 2% Cloths 1 Application(s) Topical <User Schedule>  clopidogrel Tablet 75 milliGRAM(s) Enteral Tube daily  dexMEDEtomidine Infusion 0.05 MICROgram(s)/kG/Hr (1.04 mL/Hr) IV Continuous <Continuous>  dextrose 5%. 1000 milliLiter(s) (100 mL/Hr) IV Continuous <Continuous>  dextrose 5%. 1000 milliLiter(s) (50 mL/Hr) IV Continuous <Continuous>  dextrose 50% Injectable 25 Gram(s) IV Push once  dextrose 50% Injectable 12.5 Gram(s) IV Push once  dextrose 50% Injectable 25 Gram(s) IV Push once  ezetimibe 10 milliGRAM(s) Oral daily  fentaNYL   Infusion. 0.5 MICROgram(s)/kG/Hr (4.17 mL/Hr) IV Continuous <Continuous>  glucagon  Injectable 1 milliGRAM(s) IntraMuscular once  insulin glargine Injectable (LANTUS) 34 Unit(s) SubCutaneous at bedtime  insulin lispro (ADMELOG) corrective regimen sliding scale   SubCutaneous every 6 hours  pantoprazole  Injectable 40 milliGRAM(s) IV Push two times a day  propofol Infusion 5 MICROgram(s)/kG/Min (2.54 mL/Hr) IV Continuous <Continuous>    MEDICATIONS  (PRN):  acetaminophen     Tablet .. 650 milliGRAM(s) Oral every 6 hours PRN Temp greater or equal to 38C (100.4F), Mild Pain (1 - 3)  albuterol/ipratropium for Nebulization 3 milliLiter(s) Nebulizer every 6 hours PRN Shortness of Breath and/or Wheezing  aluminum hydroxide/magnesium hydroxide/simethicone Suspension 30 milliLiter(s) Oral every 4 hours PRN Dyspepsia  dextrose Oral Gel 15 Gram(s) Oral once PRN Blood Glucose LESS THAN 70 milliGRAM(s)/deciliter  guaiFENesin Oral Liquid (Sugar-Free) 200 milliGRAM(s) Oral every 6 hours PRN Cough  melatonin 5 milliGRAM(s) Oral at bedtime PRN Insomnia  midodrine. 5 milliGRAM(s) Oral three times a day PRN Hypotension  ondansetron Injectable 4 milliGRAM(s) IV Push every 8 hours PRN Nausea and/or Vomiting          Vital Signs Last 24 Hrs  T(C): 38.7 (21 Jun 2025 11:00), Max: 38.8 (21 Jun 2025 08:10)  T(F): 101.7 (21 Jun 2025 11:00), Max: 101.8 (21 Jun 2025 08:10)  HR: 95 (21 Jun 2025 09:00) (70 - 116)  BP: 174/83 (21 Jun 2025 09:00) (88/50 - 192/91)  BP(mean): 119 (21 Jun 2025 09:00) (64 - 125)  RR: 29 (21 Jun 2025 11:00) (15 - 45)  SpO2: 100% (21 Jun 2025 09:00) (88% - 100%)    Parameters below as of 21 Jun 2025 09:00  Patient On (Oxygen Delivery Method): ventilator    O2 Concentration (%): 45  I&O's Detail    20 Jun 2025 07:01  -  21 Jun 2025 07:00  --------------------------------------------------------  IN:    Dexmedetomidine: 549.7 mL    dextrose 5% + lactated ringers: 120 mL    Enteral Tube Flush: 190 mL    FentaNYL: 587 mL    Free Water: 200 mL    Peptamen A.F.: 1248 mL  Total IN: 2894.7 mL    OUT:    Indwelling Catheter - Urethral (mL): 1430 mL    Propofol: 0 mL    Rectal Tube (mL): 0 mL  Total OUT: 1430 mL    Total NET: 1464.7 mL      21 Jun 2025 07:01  -  21 Jun 2025 13:20  --------------------------------------------------------  IN:    Dexmedetomidine: 75 mL  Total IN: 75 mL    OUT:    FentaNYL: 0 mL    Indwelling Catheter - Urethral (mL): 875 mL    Propofol: 0 mL  Total OUT: 875 mL    Total NET: -800 mL      PHYSICAL EXAM:  GENERAL:  71y/o Male NAD, resting comfortably.  HEAD:  Atraumatic, Normocephalic  EYES: EOMI, PERRLA, conjunctiva and sclera clear  NECK: Supple, No JVD, no cervical lymphadenopathy, non-tender  CHEST/LUNG: Clear to auscultation bilaterally; No wheeze, rhonchi, or rales  HEART: Regular rate and rhythm; S1&S2  ABDOMEN: Soft, Nontender, Nondistended x 4 quadrants; Bowel sounds present  EXTREMITIES:   Peripheral Pulses Present, B/L LE pitting edema 1+  PSYCH: AAOx3, cooperative, appropriate  NEUROLOGY: WNL  SKIN: WNL        EKG/ TELEM:  < from: 12 Lead ECG (06.14.25 @ 04:54) >  Atrial-sensed ventricular-paced rhythm  Biventricular pacemaker detected  Abnormal ECG    < end of copied text >          LABS:                          9.2    11.92 )-----------( 202      ( 21 Jun 2025 06:32 )             31.8       21 Jun 2025 06:32    148[H]  |  110    |  63[HH]  ----------------------------<  329[H]  4.9     |  27     |  1.6[H]  20 Jun 2025 06:16    147[H]  |  109    |  56[H]  ----------------------------<  189[H]  4.3     |  26     |  1.5      Ca    8.5        21 Jun 2025 06:32  Ca    8.6        20 Jun 2025 06:16  Phos  2.7       21 Jun 2025 06:32  Mg     2.3       21 Jun 2025 06:32  Mg     2.1       20 Jun 2025 06:16    TPro  5.4[L]  /  Alb  2.9[L]  /  TBili  0.3    /  DBili  x      /  AST  11     /  ALT  8      /  AlkPhos  59     21 Jun 2025 06:32  TPro  5.4[L]  /  Alb  2.8[L]  /  TBili  0.4    /  DBili  x      /  AST  12     /  ALT  9      /  AlkPhos  58     20 Jun 2025 06:16          Diagnostic testing:  < from: Xray Chest 1 View- PORTABLE-Routine (Xray Chest 1 View- PORTABLE-Routine in AM.) (06.21.25 @ 07:28) >    IMPRESSION:    Right opacity/pleural effusion, increased. Redemonstration left opacity   and cardiomegaly.    --- End of Report ---    < end of copied text >              < from: TTE Echo Complete w/o Contrast w/ Doppler (06.16.25 @ 10:37) >  ____________    CONCLUSIONS:     1. Left ventricular systolic function is moderately decreased with an   ejection fraction of 31 % by Beck's method of disks.   2. Severe left ventricular hypertrophy.   3. There is severe (grade 3) left ventricular diastolic dysfunction.   4. Normal right ventricular cavity size, with normal wall thickness, and   normal right ventricular systolic function. Tricuspid annular plane   systolic excursion (TAPSE) is 1.5 cm (normal >=1.7 cm).   5. Device lead is visualized in the right heart.   6. Left atrium is moderately dilated.   7. A bioprosthetic valve replacement valve replacement is present in the   aortic position The prosthetic valve is well seated. Trace intravalvular   regurgitation.   8. Trace mitral regurgitation at a blood pressure of 119/75 mmHg.   9. Mild tricuspid regurgitation.  10. Estimated pulmonary artery systolic pressure is 44 mmHg, consistent   with mild pulmonary hypertension.  11. No pericardial effusion seen.      < end of copied text >        Assessment and Recommendations:   70 year old male with past medical history of  COPD (on 2L home O2), DM 2  HFrEF 30%, CAD,  ischemic cardiomyopathy s/p CRT-D, HTN, paroxysmal A-fib (status post ablation 1/14/25) on amiodarone and  Eliquis who presented to ED w c/o LESLIE and b/l  leg swelling.   Patient remained intubated in ICU. Continue on IV bumex for volume overload.     Impression:  #SOB/LESLIE: Multifactorial  #HFrEF (EF 30%, s/p CRT-D)  #CAD s/p PCI to RCA and LAD  #NSTEMI/ Hx of frequent NSVT   #Paroxysmal AFib on Eliquis      Recommendation:  - Patient remains intubated and sedated.   - Volume status has improved  - Can hold IV Bumex for 1-2 days given rise in Cr. Monitor volume status closely   - ID following for possible infection   - Wean off pressors as able   - Strict intake and output, maintain negative balance for now  - Restart home GDMT for HFrEF, when stable/medically appropriate   - Pt needs ischemic eval, but has refused in the past   -Ongoing GOC discussion

## 2025-06-21 NOTE — PROGRESS NOTE ADULT - SUBJECTIVE AND OBJECTIVE BOX
Patient is a 70y old  Male who presents with a chief complaint of SOB (20 Jun 2025 14:24)      Over Night Events:  Patient seen and examined.   on vent   on precedex and fentanyl     ROS:  See HPI    PHYSICAL EXAM    ICU Vital Signs Last 24 Hrs  T(C): 37.3 (21 Jun 2025 03:00), Max: 38.5 (20 Jun 2025 15:00)  T(F): 99.1 (21 Jun 2025 03:00), Max: 101.3 (20 Jun 2025 15:00)  HR: 98 (21 Jun 2025 04:00) (63 - 124)  BP: 146/74 (21 Jun 2025 04:00) (88/50 - 205/95)  BP(mean): 102 (21 Jun 2025 04:00) (64 - 148)  ABP: --  ABP(mean): --  RR: 28 (21 Jun 2025 04:00) (11 - 45)  SpO2: 100% (21 Jun 2025 04:00) (88% - 100%)    O2 Parameters below as of 20 Jun 2025 21:00  Patient On (Oxygen Delivery Method): ventilator            General: on sedation   HEENT:     et tube               Lungs: Bilateral BS  Cardiovascular: Regular   Abdomen: Soft, Positive BS  Extremities: No clubbing   Skin: warm   Neurological:   Musculoskeletal: move all ext     I&O's Detail    19 Jun 2025 07:01  -  20 Jun 2025 07:00  --------------------------------------------------------  IN:    Dexmedetomidine: 625 mL    dextrose 5% + lactated ringers: 280 mL    Enteral Tube Flush: 120 mL    FentaNYL: 457 mL    IV PiggyBack: 250 mL  Total IN: 1732 mL    OUT:    Indwelling Catheter - Urethral (mL): 4355 mL    Norepinephrine: 0 mL    Peptamen A.F.: 0 mL    Propofol: 0 mL    Rectal Tube (mL): 200 mL  Total OUT: 4555 mL    Total NET: -2823 mL      20 Jun 2025 07:01  -  21 Jun 2025 06:39  --------------------------------------------------------  IN:    Dexmedetomidine: 349.7 mL    dextrose 5% + lactated ringers: 120 mL    Enteral Tube Flush: 130 mL    FentaNYL: 387 mL    Free Water: 100 mL    Peptamen A.F.: 737 mL  Total IN: 1823.7 mL    OUT:    Indwelling Catheter - Urethral (mL): 1430 mL    Propofol: 0 mL    Rectal Tube (mL): 0 mL  Total OUT: 1430 mL    Total NET: 393.7 mL          LABS:                          10.0   17.19 )-----------( 235      ( 20 Jun 2025 19:26 )             33.7         20 Jun 2025 06:16    147    |  109    |  56     ----------------------------<  189    4.3     |  26     |  1.5      Ca    8.6        20 Jun 2025 06:16  Mg     2.1       20 Jun 2025 06:16                                                                                      Urinalysis Basic - ( 20 Jun 2025 06:16 )    Color: x / Appearance: x / SG: x / pH: x  Gluc: 189 mg/dL / Ketone: x  / Bili: x / Urobili: x   Blood: x / Protein: x / Nitrite: x   Leuk Esterase: x / RBC: x / WBC x   Sq Epi: x / Non Sq Epi: x / Bacteria: x                                                                                                             Mode: AC/ CMV (Assist Control/ Continuous Mandatory Ventilation)  RR (machine): 20  TV (machine): 400  FiO2: 45  PEEP: 8  MAP: 17  PIP: 29                                      ABG - ( 21 Jun 2025 04:30 )  pH, Arterial: 7.44  pH, Blood: x     /  pCO2: 45    /  pO2: 138   / HCO3: 31    / Base Excess: 5.8   /  SaO2: 99.6                MEDICATIONS  (STANDING):  aMIOdarone    Tablet 200 milliGRAM(s) Oral daily  apixaban 5 milliGRAM(s) Enteral Tube every 12 hours  atorvastatin 40 milliGRAM(s) Oral at bedtime  bumetanide Injectable 1 milliGRAM(s) IV Push daily  chlorhexidine 0.12% Liquid 15 milliLiter(s) Oral Mucosa every 12 hours  chlorhexidine 2% Cloths 1 Application(s) Topical <User Schedule>  clopidogrel Tablet 75 milliGRAM(s) Enteral Tube daily  dexMEDEtomidine Infusion 0.05 MICROgram(s)/kG/Hr (1.04 mL/Hr) IV Continuous <Continuous>  dextrose 5%. 1000 milliLiter(s) (100 mL/Hr) IV Continuous <Continuous>  dextrose 5%. 1000 milliLiter(s) (50 mL/Hr) IV Continuous <Continuous>  dextrose 50% Injectable 25 Gram(s) IV Push once  dextrose 50% Injectable 12.5 Gram(s) IV Push once  dextrose 50% Injectable 25 Gram(s) IV Push once  ezetimibe 10 milliGRAM(s) Oral daily  fentaNYL   Infusion. 0.5 MICROgram(s)/kG/Hr (4.17 mL/Hr) IV Continuous <Continuous>  glucagon  Injectable 1 milliGRAM(s) IntraMuscular once  insulin glargine Injectable (LANTUS) 34 Unit(s) SubCutaneous at bedtime  insulin lispro (ADMELOG) corrective regimen sliding scale   SubCutaneous every 6 hours  pantoprazole  Injectable 40 milliGRAM(s) IV Push two times a day  propofol Infusion 5 MICROgram(s)/kG/Min (2.54 mL/Hr) IV Continuous <Continuous>    MEDICATIONS  (PRN):  acetaminophen     Tablet .. 650 milliGRAM(s) Oral every 6 hours PRN Temp greater or equal to 38C (100.4F), Mild Pain (1 - 3)  albuterol/ipratropium for Nebulization 3 milliLiter(s) Nebulizer every 6 hours PRN Shortness of Breath and/or Wheezing  aluminum hydroxide/magnesium hydroxide/simethicone Suspension 30 milliLiter(s) Oral every 4 hours PRN Dyspepsia  dextrose Oral Gel 15 Gram(s) Oral once PRN Blood Glucose LESS THAN 70 milliGRAM(s)/deciliter  guaiFENesin Oral Liquid (Sugar-Free) 200 milliGRAM(s) Oral every 6 hours PRN Cough  melatonin 5 milliGRAM(s) Oral at bedtime PRN Insomnia  midodrine. 5 milliGRAM(s) Oral three times a day PRN Hypotension  ondansetron Injectable 4 milliGRAM(s) IV Push every 8 hours PRN Nausea and/or Vomiting          Xrays:  TLC:  OG:  ET tube:                                                                                       ECHO:  [facility] ICU:

## 2025-06-22 NOTE — PROGRESS NOTE ADULT - SUBJECTIVE AND OBJECTIVE BOX
Pt calm, sedated    T(F): , Max: 101.5 (06-22-25 @ 01:06)  HR: 100 (06-22-25 @ 23:00) (70 - 105)  BP: 145/67 (06-22-25 @ 23:00)  RR: 21 (06-22-25 @ 23:00)  SpO2: 100% (06-22-25 @ 23:00)  IN: 2561 mL / OUT: 2345 mL / NET: 216 mL    IN: 2389.5 mL / OUT: 1345 mL / NET: 1044.5 mL      General: No apparent distress  Cardiovascular: S1, S2  Gastrointestinal: Soft, Non-tender, Non-distended  Respiratory: vented  Neurologic: sedated  Dermatologic: Skin dry                          9.5    14.25 )-----------( 202      ( 22 Jun 2025 06:23 )             32.8     06-22    149[H]  |  113[H]  |  60[H]  ----------------------------<  226[H]  5.1[H]   |  27  |  1.3    Ca    8.7      22 Jun 2025 06:23  Phos  2.7     06-21  Mg     2.4     06-22    TPro  5.6[L]  /  Alb  2.8[L]  /  TBili  0.3  /  DBili  x   /  AST  13  /  ALT  9   /  AlkPhos  53  06-22

## 2025-06-22 NOTE — PROGRESS NOTE ADULT - SUBJECTIVE AND OBJECTIVE BOX
Patient is a 70y old  Male who presents with a chief complaint of SOB (21 Jun 2025 13:20)      Over Night Events:  Patient seen and examined.   on vent   on sedation   ROS:  See HPI    PHYSICAL EXAM    ICU Vital Signs Last 24 Hrs  T(C): 36.1 (22 Jun 2025 02:00), Max: 38.8 (21 Jun 2025 08:10)  T(F): 97 (22 Jun 2025 02:00), Max: 101.8 (21 Jun 2025 08:10)  HR: 87 (22 Jun 2025 05:12) (72 - 99)  BP: 131/57 (22 Jun 2025 05:12) (99/53 - 174/83)  BP(mean): 86 (22 Jun 2025 05:12) (72 - 119)  ABP: --  ABP(mean): --  RR: 20 (22 Jun 2025 05:12) (15 - 29)  SpO2: 99% (22 Jun 2025 05:12) (99% - 100%)    O2 Parameters below as of 22 Jun 2025 02:00  Patient On (Oxygen Delivery Method): ventilator    O2 Concentration (%): 40        General: on sedation   HEENT:        et tube            Lungs: Bilateral BS  Cardiovascular: Regular   Abdomen: Soft, Positive BS  Extremities: No clubbing   Skin: warm   Neurological: positive gag   Musculoskeletal: move all ext     I&O's Detail    20 Jun 2025 07:01  -  21 Jun 2025 07:00  --------------------------------------------------------  IN:    Dexmedetomidine: 549.7 mL    dextrose 5% + lactated ringers: 120 mL    Enteral Tube Flush: 190 mL    FentaNYL: 587 mL    Free Water: 200 mL    Peptamen A.F.: 1248 mL  Total IN: 2894.7 mL    OUT:    Indwelling Catheter - Urethral (mL): 1430 mL    Propofol: 0 mL    Rectal Tube (mL): 0 mL  Total OUT: 1430 mL    Total NET: 1464.7 mL      21 Jun 2025 07:01  -  22 Jun 2025 06:38  --------------------------------------------------------  IN:    Dexmedetomidine: 550 mL    Enteral Tube Flush: 120 mL    FentaNYL: 450 mL    Free Water: 200 mL    Peptamen A.F.: 1241 mL  Total IN: 2561 mL    OUT:    Indwelling Catheter - Urethral (mL): 2285 mL    Propofol: 0 mL  Total OUT: 2285 mL    Total NET: 276 mL          LABS:                          9.2    11.92 )-----------( 202      ( 21 Jun 2025 06:32 )             31.8         21 Jun 2025 06:32    148    |  110    |  63     ----------------------------<  329    4.9     |  27     |  1.6      Ca    8.5        21 Jun 2025 06:32  Phos  2.7       21 Jun 2025 06:32  Mg     2.3       21 Jun 2025 06:32                                                                                      Urinalysis Basic - ( 21 Jun 2025 06:32 )    Color: x / Appearance: x / SG: x / pH: x  Gluc: 329 mg/dL / Ketone: x  / Bili: x / Urobili: x   Blood: x / Protein: x / Nitrite: x   Leuk Esterase: x / RBC: x / WBC x   Sq Epi: x / Non Sq Epi: x / Bacteria: x                                                                                                             Mode: AC/ CMV (Assist Control/ Continuous Mandatory Ventilation)  RR (machine): 20  TV (machine): 400  FiO2: 45  PEEP: 8  MAP: 13  PIP: 24                                      ABG - ( 22 Jun 2025 05:24 )  pH, Arterial: 7.35  pH, Blood: x     /  pCO2: 58    /  pO2: 142   / HCO3: 32    / Base Excess: 4.7   /  SaO2: 100.0               MEDICATIONS  (STANDING):  aMIOdarone    Tablet 200 milliGRAM(s) Oral daily  apixaban 5 milliGRAM(s) Enteral Tube every 12 hours  atorvastatin 40 milliGRAM(s) Oral at bedtime  chlorhexidine 0.12% Liquid 15 milliLiter(s) Oral Mucosa every 12 hours  chlorhexidine 2% Cloths 1 Application(s) Topical <User Schedule>  clopidogrel Tablet 75 milliGRAM(s) Enteral Tube daily  dexMEDEtomidine Infusion 0.05 MICROgram(s)/kG/Hr (1.04 mL/Hr) IV Continuous <Continuous>  dextrose 5%. 1000 milliLiter(s) (100 mL/Hr) IV Continuous <Continuous>  dextrose 5%. 1000 milliLiter(s) (50 mL/Hr) IV Continuous <Continuous>  dextrose 50% Injectable 25 Gram(s) IV Push once  dextrose 50% Injectable 12.5 Gram(s) IV Push once  dextrose 50% Injectable 25 Gram(s) IV Push once  ezetimibe 10 milliGRAM(s) Oral daily  fentaNYL   Infusion. 0.5 MICROgram(s)/kG/Hr (4.17 mL/Hr) IV Continuous <Continuous>  glucagon  Injectable 1 milliGRAM(s) IntraMuscular once  insulin glargine Injectable (LANTUS) 34 Unit(s) SubCutaneous at bedtime  insulin lispro (ADMELOG) corrective regimen sliding scale   SubCutaneous every 6 hours  insulin lispro Injectable (ADMELOG) 8 Unit(s) SubCutaneous every 6 hours  pantoprazole  Injectable 40 milliGRAM(s) IV Push two times a day  propofol Infusion 5 MICROgram(s)/kG/Min (2.54 mL/Hr) IV Continuous <Continuous>    MEDICATIONS  (PRN):  acetaminophen     Tablet .. 650 milliGRAM(s) Oral every 6 hours PRN Temp greater or equal to 38C (100.4F), Mild Pain (1 - 3)  albuterol/ipratropium for Nebulization 3 milliLiter(s) Nebulizer every 6 hours PRN Shortness of Breath and/or Wheezing  aluminum hydroxide/magnesium hydroxide/simethicone Suspension 30 milliLiter(s) Oral every 4 hours PRN Dyspepsia  dextrose Oral Gel 15 Gram(s) Oral once PRN Blood Glucose LESS THAN 70 milliGRAM(s)/deciliter  guaiFENesin Oral Liquid (Sugar-Free) 200 milliGRAM(s) Oral every 6 hours PRN Cough  melatonin 5 milliGRAM(s) Oral at bedtime PRN Insomnia  midodrine. 5 milliGRAM(s) Oral three times a day PRN Hypotension  ondansetron Injectable 4 milliGRAM(s) IV Push every 8 hours PRN Nausea and/or Vomiting          Xrays:  right effusion /opacity    ECHO:  [facility] ICU:

## 2025-06-22 NOTE — PROGRESS NOTE ADULT - ASSESSMENT
IMPRESSION:    Acute on chronic HFrEF decompensated heart failure EF 31%   Anemia   Acute pulmonary edema  COPD, not in active exacerbation  pulm HTN multifactorial secondary to cardiomyopathy reduced EF, likely LAW, baseline COPD  chronic hypoxia from all the above  B/L small pleural effusions due to  pulmonary edema    PLAN:    CNS: Daily SAT, Seroquel QHS if needed for agitation     HEENT: Oral care    PULMONARY:  HOB @ 45 degrees. CXR noted w/congestion, Duonebs q 6 hrs PRN, SBT if passes SAT      CARDIOVASCULAR: TTE noted , volume contraction as tolerated, Cardiology follow up,    give bumex 1mg  today     GI: GI prophylaxis. Feeding. C. Diff GI PCR negative, trend Hb, f/u GI reccs, f/u CT abdomen    RENAL:  Follow up lytes. Correct as needed,   D5w 70cc/ hr      INFECTIOUS DISEASE: Follow up cultures, MRSA +, continue  vancomycin and cefepime for now  follow ID   HEMATOLOGICAL: On AC,   okay per GI, f/u Us duplex LE with no DVT, hold AC if active bleed      ENDOCRINE:  Follow up FS.  Insulin protocol if needed.    MUSCULOSKELETAL: off loading     Palliative follow up  MICU monitoring

## 2025-06-23 NOTE — PROGRESS NOTE ADULT - SUBJECTIVE AND OBJECTIVE BOX
Patient is a 70y old  Male who presents with a chief complaint of SOB (23 Jun 2025 07:32)        Over Night Events: No acute events noted        ROS:     All ROS are negative except HPI         PHYSICAL EXAM    ICU Vital Signs Last 24 Hrs  T(C): 36.1 (23 Jun 2025 07:06), Max: 36.1 (22 Jun 2025 19:00)  T(F): 97 (23 Jun 2025 07:06), Max: 97 (22 Jun 2025 19:00)  HR: 73 (23 Jun 2025 08:23) (69 - 109)  BP: 127/59 (23 Jun 2025 07:06) (86/54 - 177/80)  BP(mean): 85 (23 Jun 2025 07:06) (63 - 116)  ABP: --  ABP(mean): --  RR: 21 (23 Jun 2025 07:06) (17 - 26)  SpO2: 100% (23 Jun 2025 08:23) (90% - 100%)    O2 Parameters below as of 23 Jun 2025 07:06  Patient On (Oxygen Delivery Method): ventilator        CONSTITUTIONAL:  NAD    ENT:   Airway patent,   ET    EYES:   Pupils equal,   Round and reactive to light.    CARDIAC:   Normal rate,   Regular rhythm.      Vascular:  Normal systolic impulse  No Carotid bruits    RESPIRATORY:   No wheezing  Bilateral BS    GASTROINTESTINAL:  Abdomen soft,   Non-tender,   No guarding,   + BS    MUSCULOSKELETAL:   No clubbing, cyanosis    NEUROLOGICAL:   Intubated   No motor  deficits.    SKIN:   Skin normal color for race,   Warm and dry and intact.   No evidence of rash.      06-22-25 @ 07:01  -  06-23-25 @ 07:00  --------------------------------------------------------  IN:    Dexmedetomidine: 625 mL    Enteral Tube Flush: 120 mL    FentaNYL: 597.5 mL    Free Water: 250 mL    IV PiggyBack: 450 mL    Peptamen A.F.: 1314 mL  Total IN: 3356.5 mL    OUT:    Indwelling Catheter - Urethral (mL): 1880 mL    Rectal Tube (mL): 100 mL  Total OUT: 1980 mL    Total NET: 1376.5 mL          LABS:                            8.9    15.55 )-----------( 194      ( 23 Jun 2025 06:20 )             32.0                                               06-23    148[H]  |  113[H]  |  64[HH]  ----------------------------<  242[H]  5.2[H]   |  26  |  1.4    Ca    8.1[L]      23 Jun 2025 06:20  Phos  2.9     06-23  Mg     2.4     06-23    TPro  5.2[L]  /  Alb  2.8[L]  /  TBili  0.2  /  DBili  x   /  AST  25  /  ALT  17  /  AlkPhos  52  06-23      PT/INR - ( 23 Jun 2025 06:20 )   PT: 29.60 sec;   INR: 2.46 ratio         PTT - ( 23 Jun 2025 06:20 )  PTT:33.6 sec                                       Urinalysis Basic - ( 23 Jun 2025 06:20 )    Color: x / Appearance: x / SG: x / pH: x  Gluc: 242 mg/dL / Ketone: x  / Bili: x / Urobili: x   Blood: x / Protein: x / Nitrite: x   Leuk Esterase: x / RBC: x / WBC x   Sq Epi: x / Non Sq Epi: x / Bacteria: x                                                  LIVER FUNCTIONS - ( 23 Jun 2025 06:20 )  Alb: 2.8 g/dL / Pro: 5.2 g/dL / ALK PHOS: 52 U/L / ALT: 17 U/L / AST: 25 U/L / GGT: x                                                                                               Mode: AC/ CMV (Assist Control/ Continuous Mandatory Ventilation)  RR (machine): 20  TV (machine): 400  FiO2: 45  PEEP: 8  MAP: 13  PIP: 16                                      ABG - ( 23 Jun 2025 05:16 )  pH, Arterial: 7.34  pH, Blood: x     /  pCO2: 58    /  pO2: 140   / HCO3: 31    / Base Excess: 3.9   /  SaO2: 100.0               MEDICATIONS  (STANDING):  aMIOdarone    Tablet 200 milliGRAM(s) Oral daily  apixaban 5 milliGRAM(s) Enteral Tube every 12 hours  atorvastatin 40 milliGRAM(s) Oral at bedtime  cefepime   IVPB      cefepime   IVPB 2000 milliGRAM(s) IV Intermittent every 12 hours  chlorhexidine 0.12% Liquid 15 milliLiter(s) Oral Mucosa every 12 hours  chlorhexidine 2% Cloths 1 Application(s) Topical <User Schedule>  clopidogrel Tablet 75 milliGRAM(s) Enteral Tube daily  dexMEDEtomidine Infusion 0.05 MICROgram(s)/kG/Hr (1.04 mL/Hr) IV Continuous <Continuous>  dextrose 5%. 1000 milliLiter(s) (100 mL/Hr) IV Continuous <Continuous>  dextrose 5%. 1000 milliLiter(s) (50 mL/Hr) IV Continuous <Continuous>  dextrose 50% Injectable 25 Gram(s) IV Push once  dextrose 50% Injectable 12.5 Gram(s) IV Push once  dextrose 50% Injectable 25 Gram(s) IV Push once  ezetimibe 10 milliGRAM(s) Oral daily  fentaNYL   Infusion. 0.5 MICROgram(s)/kG/Hr (4.17 mL/Hr) IV Continuous <Continuous>  glucagon  Injectable 1 milliGRAM(s) IntraMuscular once  insulin glargine Injectable (LANTUS) 34 Unit(s) SubCutaneous at bedtime  insulin lispro (ADMELOG) corrective regimen sliding scale   SubCutaneous every 6 hours  insulin lispro Injectable (ADMELOG) 8 Unit(s) SubCutaneous every 6 hours  pantoprazole  Injectable 40 milliGRAM(s) IV Push two times a day  propofol Infusion 5 MICROgram(s)/kG/Min (2.54 mL/Hr) IV Continuous <Continuous>    MEDICATIONS  (PRN):  acetaminophen     Tablet .. 650 milliGRAM(s) Oral every 6 hours PRN Temp greater or equal to 38C (100.4F), Mild Pain (1 - 3)  albuterol/ipratropium for Nebulization 3 milliLiter(s) Nebulizer every 6 hours PRN Shortness of Breath and/or Wheezing  aluminum hydroxide/magnesium hydroxide/simethicone Suspension 30 milliLiter(s) Oral every 4 hours PRN Dyspepsia  dextrose Oral Gel 15 Gram(s) Oral once PRN Blood Glucose LESS THAN 70 milliGRAM(s)/deciliter  guaiFENesin Oral Liquid (Sugar-Free) 200 milliGRAM(s) Oral every 6 hours PRN Cough  melatonin 5 milliGRAM(s) Oral at bedtime PRN Insomnia  midodrine. 5 milliGRAM(s) Oral three times a day PRN Hypotension  ondansetron Injectable 4 milliGRAM(s) IV Push every 8 hours PRN Nausea and/or Vomiting      New X-rays reviewed:                                                                                  ECHO       Patient is a 70y old  Male who presents with a chief complaint of SOB (23 Jun 2025 07:32)        Over Night Events: No acute events noted  no pressors   on vent     ROS:     All ROS are negative except HPI         PHYSICAL EXAM    ICU Vital Signs Last 24 Hrs  T(C): 36.1 (23 Jun 2025 07:06), Max: 36.1 (22 Jun 2025 19:00)  T(F): 97 (23 Jun 2025 07:06), Max: 97 (22 Jun 2025 19:00)  HR: 73 (23 Jun 2025 08:23) (69 - 109)  BP: 127/59 (23 Jun 2025 07:06) (86/54 - 177/80)  BP(mean): 85 (23 Jun 2025 07:06) (63 - 116)  ABP: --  ABP(mean): --  RR: 21 (23 Jun 2025 07:06) (17 - 26)  SpO2: 100% (23 Jun 2025 08:23) (90% - 100%)    O2 Parameters below as of 23 Jun 2025 07:06  Patient On (Oxygen Delivery Method): ventilator        CONSTITUTIONAL:  NAD    ENT:   Airway patent,   ET    EYES:   Pupils equal,   Round and reactive to light.    CARDIAC:   Normal rate,   Regular rhythm.      Vascular:  Normal systolic impulse  No Carotid bruits    RESPIRATORY:   No wheezing  Bilateral BS    GASTROINTESTINAL:  Abdomen soft,   Non-tender,   No guarding,   + BS    MUSCULOSKELETAL:   No clubbing, cyanosis    NEUROLOGICAL:   Intubated   No motor  deficits.    SKIN:   Skin normal color for race,   Warm and dry and intact.   No evidence of rash.      06-22-25 @ 07:01  -  06-23-25 @ 07:00  --------------------------------------------------------  IN:    Dexmedetomidine: 625 mL    Enteral Tube Flush: 120 mL    FentaNYL: 597.5 mL    Free Water: 250 mL    IV PiggyBack: 450 mL    Peptamen A.F.: 1314 mL  Total IN: 3356.5 mL    OUT:    Indwelling Catheter - Urethral (mL): 1880 mL    Rectal Tube (mL): 100 mL  Total OUT: 1980 mL    Total NET: 1376.5 mL          LABS:                            8.9    15.55 )-----------( 194      ( 23 Jun 2025 06:20 )             32.0                                               06-23    148[H]  |  113[H]  |  64[HH]  ----------------------------<  242[H]  5.2[H]   |  26  |  1.4    Ca    8.1[L]      23 Jun 2025 06:20  Phos  2.9     06-23  Mg     2.4     06-23    TPro  5.2[L]  /  Alb  2.8[L]  /  TBili  0.2  /  DBili  x   /  AST  25  /  ALT  17  /  AlkPhos  52  06-23      PT/INR - ( 23 Jun 2025 06:20 )   PT: 29.60 sec;   INR: 2.46 ratio         PTT - ( 23 Jun 2025 06:20 )  PTT:33.6 sec                                       Urinalysis Basic - ( 23 Jun 2025 06:20 )    Color: x / Appearance: x / SG: x / pH: x  Gluc: 242 mg/dL / Ketone: x  / Bili: x / Urobili: x   Blood: x / Protein: x / Nitrite: x   Leuk Esterase: x / RBC: x / WBC x   Sq Epi: x / Non Sq Epi: x / Bacteria: x                                                  LIVER FUNCTIONS - ( 23 Jun 2025 06:20 )  Alb: 2.8 g/dL / Pro: 5.2 g/dL / ALK PHOS: 52 U/L / ALT: 17 U/L / AST: 25 U/L / GGT: x                                                                                               Mode: AC/ CMV (Assist Control/ Continuous Mandatory Ventilation)  RR (machine): 20  TV (machine): 400  FiO2: 45  PEEP: 8  MAP: 13  PIP: 16                                      ABG - ( 23 Jun 2025 05:16 )  pH, Arterial: 7.34  pH, Blood: x     /  pCO2: 58    /  pO2: 140   / HCO3: 31    / Base Excess: 3.9   /  SaO2: 100.0               MEDICATIONS  (STANDING):  aMIOdarone    Tablet 200 milliGRAM(s) Oral daily  apixaban 5 milliGRAM(s) Enteral Tube every 12 hours  atorvastatin 40 milliGRAM(s) Oral at bedtime  cefepime   IVPB      cefepime   IVPB 2000 milliGRAM(s) IV Intermittent every 12 hours  chlorhexidine 0.12% Liquid 15 milliLiter(s) Oral Mucosa every 12 hours  chlorhexidine 2% Cloths 1 Application(s) Topical <User Schedule>  clopidogrel Tablet 75 milliGRAM(s) Enteral Tube daily  dexMEDEtomidine Infusion 0.05 MICROgram(s)/kG/Hr (1.04 mL/Hr) IV Continuous <Continuous>  dextrose 5%. 1000 milliLiter(s) (100 mL/Hr) IV Continuous <Continuous>  dextrose 5%. 1000 milliLiter(s) (50 mL/Hr) IV Continuous <Continuous>  dextrose 50% Injectable 25 Gram(s) IV Push once  dextrose 50% Injectable 12.5 Gram(s) IV Push once  dextrose 50% Injectable 25 Gram(s) IV Push once  ezetimibe 10 milliGRAM(s) Oral daily  fentaNYL   Infusion. 0.5 MICROgram(s)/kG/Hr (4.17 mL/Hr) IV Continuous <Continuous>  glucagon  Injectable 1 milliGRAM(s) IntraMuscular once  insulin glargine Injectable (LANTUS) 34 Unit(s) SubCutaneous at bedtime  insulin lispro (ADMELOG) corrective regimen sliding scale   SubCutaneous every 6 hours  insulin lispro Injectable (ADMELOG) 8 Unit(s) SubCutaneous every 6 hours  pantoprazole  Injectable 40 milliGRAM(s) IV Push two times a day  propofol Infusion 5 MICROgram(s)/kG/Min (2.54 mL/Hr) IV Continuous <Continuous>    MEDICATIONS  (PRN):  acetaminophen     Tablet .. 650 milliGRAM(s) Oral every 6 hours PRN Temp greater or equal to 38C (100.4F), Mild Pain (1 - 3)  albuterol/ipratropium for Nebulization 3 milliLiter(s) Nebulizer every 6 hours PRN Shortness of Breath and/or Wheezing  aluminum hydroxide/magnesium hydroxide/simethicone Suspension 30 milliLiter(s) Oral every 4 hours PRN Dyspepsia  dextrose Oral Gel 15 Gram(s) Oral once PRN Blood Glucose LESS THAN 70 milliGRAM(s)/deciliter  guaiFENesin Oral Liquid (Sugar-Free) 200 milliGRAM(s) Oral every 6 hours PRN Cough  melatonin 5 milliGRAM(s) Oral at bedtime PRN Insomnia  midodrine. 5 milliGRAM(s) Oral three times a day PRN Hypotension  ondansetron Injectable 4 milliGRAM(s) IV Push every 8 hours PRN Nausea and/or Vomiting      New X-rays reviewed:                                                                                  ECHO

## 2025-06-23 NOTE — PROGRESS NOTE ADULT - TIME BILLING
56 minutes spent on total encounter; more than 50% of the visit was spent counseling and / or coordinating care by the attending physician.  The necessity of the time spent during the encounter on this date of service was due to: Coordination of care.
Coordination of care

## 2025-06-23 NOTE — PROGRESS NOTE ADULT - SUBJECTIVE AND OBJECTIVE BOX
24H events:    Patient is a 70y old Male who presents with a chief complaint of SOB (23 Jun 2025 08:39)    Primary diagnosis of CHF exacerbation    Today is hospital day 9d. This morning patient was seen and examined at bedside, resting comfortably in bed.    No acute or major events overnight.    Code Status:    Family communication:  Contact date:  Name of person contacted:  Relationship to patient:  Communication details:  What matters most:    PAST MEDICAL & SURGICAL HISTORY  CHF (congestive heart failure)    Diabetes    CAD (coronary artery disease)    Chronic obstructive pulmonary disease (COPD)    HTN (hypertension)    Stented coronary artery    Dyslipidemia    GERD (gastroesophageal reflux disease)    Afib    CVA (cerebral vascular accident)    H/O heart artery stent    Heart valve replaced  aortic    History of Nissen fundoplication      SOCIAL HISTORY:  Social History:      ALLERGIES:  Bactrim (Unknown)  sulfa drugs (Unknown)    MEDICATIONS:  STANDING MEDICATIONS  aMIOdarone    Tablet 200 milliGRAM(s) Oral daily  apixaban 5 milliGRAM(s) Enteral Tube every 12 hours  atorvastatin 40 milliGRAM(s) Oral at bedtime  chlorhexidine 0.12% Liquid 15 milliLiter(s) Oral Mucosa every 12 hours  chlorhexidine 2% Cloths 1 Application(s) Topical <User Schedule>  clopidogrel Tablet 75 milliGRAM(s) Enteral Tube daily  dexMEDEtomidine Infusion 0.05 MICROgram(s)/kG/Hr IV Continuous <Continuous>  dextrose 5%. 1000 milliLiter(s) IV Continuous <Continuous>  dextrose 5%. 1000 milliLiter(s) IV Continuous <Continuous>  dextrose 50% Injectable 25 Gram(s) IV Push once  dextrose 50% Injectable 12.5 Gram(s) IV Push once  dextrose 50% Injectable 25 Gram(s) IV Push once  ezetimibe 10 milliGRAM(s) Oral daily  fentaNYL   Infusion. 0.5 MICROgram(s)/kG/Hr IV Continuous <Continuous>  glucagon  Injectable 1 milliGRAM(s) IntraMuscular once  insulin glargine Injectable (LANTUS) 34 Unit(s) SubCutaneous at bedtime  insulin lispro (ADMELOG) corrective regimen sliding scale   SubCutaneous every 6 hours  insulin lispro Injectable (ADMELOG) 8 Unit(s) SubCutaneous every 6 hours  pantoprazole  Injectable 40 milliGRAM(s) IV Push two times a day  polyethylene glycol 3350 17 Gram(s) Oral two times a day  propofol Infusion 5 MICROgram(s)/kG/Min IV Continuous <Continuous>  senna 2 Tablet(s) Oral at bedtime  sodium zirconium cyclosilicate 10 Gram(s) Oral daily    PRN MEDICATIONS  acetaminophen     Tablet .. 650 milliGRAM(s) Oral every 6 hours PRN  albuterol/ipratropium for Nebulization 3 milliLiter(s) Nebulizer every 6 hours PRN  aluminum hydroxide/magnesium hydroxide/simethicone Suspension 30 milliLiter(s) Oral every 4 hours PRN  dextrose Oral Gel 15 Gram(s) Oral once PRN  guaiFENesin Oral Liquid (Sugar-Free) 200 milliGRAM(s) Oral every 6 hours PRN  melatonin 5 milliGRAM(s) Oral at bedtime PRN  midodrine. 5 milliGRAM(s) Oral three times a day PRN  ondansetron Injectable 4 milliGRAM(s) IV Push every 8 hours PRN    VITALS:   T(F): 97  HR: 104  BP: 159/105  RR: 29  SpO2: 100%    PHYSICAL EXAM:    General: No apparent distress  Cardiovascular: S1, S2  Gastrointestinal: Soft, Non-tender, Non-distended  Respiratory: vented  Neurologic: sedated  Dermatologic: Skin dry        (  ) Indwelling Mcmahon Catheter:   Date insterted:    Reason (  ) Critical illness     (  ) urinary retention    (  ) Accurate Ins/Outs Monitoring     (  ) CMO patient    (  ) Central Line:   Date inserted:  Location: (  ) Right IJ     (  ) Left IJ     (  ) Right Fem     (  ) Left Fem    (  ) SPC        (  ) pigtail       (  ) PEG tube       (  ) colostomy       (  ) jejunostomy  (  ) U-Dall    LABS:                        8.9    15.55 )-----------( 194      ( 23 Jun 2025 06:20 )             32.0     06-23    148[H]  |  113[H]  |  64[HH]  ----------------------------<  242[H]  5.2[H]   |  26  |  1.4    Ca    8.1[L]      23 Jun 2025 06:20  Phos  2.9     06-23  Mg     2.4     06-23    TPro  5.2[L]  /  Alb  2.8[L]  /  TBili  0.2  /  DBili  x   /  AST  25  /  ALT  17  /  AlkPhos  52  06-23    PT/INR - ( 23 Jun 2025 06:20 )   PT: 29.60 sec;   INR: 2.46 ratio         PTT - ( 23 Jun 2025 06:20 )  PTT:33.6 sec  Urinalysis Basic - ( 23 Jun 2025 06:20 )    Color: x / Appearance: x / SG: x / pH: x  Gluc: 242 mg/dL / Ketone: x  / Bili: x / Urobili: x   Blood: x / Protein: x / Nitrite: x   Leuk Esterase: x / RBC: x / WBC x   Sq Epi: x / Non Sq Epi: x / Bacteria: x      ABG - ( 23 Jun 2025 05:16 )  pH, Arterial: 7.34  pH, Blood: x     /  pCO2: 58    /  pO2: 140   / HCO3: 31    / Base Excess: 3.9   /  SaO2: 100.0                     RADIOLOGY:

## 2025-06-23 NOTE — PROGRESS NOTE ADULT - SUBJECTIVE AND OBJECTIVE BOX
70yMale  Being followed for diarrhea   Interval history:  No hematemesis, melena, blood in stool reported.       PAST MEDICAL & SURGICAL HISTORY:  CHF (congestive heart failure)  Diabetes  CAD (coronary artery disease)  Chronic obstructive pulmonary disease (COPD)  HTN (hypertension)  Stented coronary artery  Dyslipidemia  GERD (gastroesophageal reflux disease)  Afib  CVA (cerebral vascular accident)  H/O heart artery stent  Heart valve replaced  aortic  History of Nissen fundoplication                Social History: No smoking. No alcohol. No illegal drug use.            MEDICATIONS  (STANDING):  aMIOdarone    Tablet 200 milliGRAM(s) Oral daily  apixaban 5 milliGRAM(s) Enteral Tube every 12 hours  atorvastatin 40 milliGRAM(s) Oral at bedtime  chlorhexidine 0.12% Liquid 15 milliLiter(s) Oral Mucosa every 12 hours  chlorhexidine 2% Cloths 1 Application(s) Topical <User Schedule>  clopidogrel Tablet 75 milliGRAM(s) Enteral Tube daily  dexMEDEtomidine Infusion 0.05 MICROgram(s)/kG/Hr (1.04 mL/Hr) IV Continuous <Continuous>  dextrose 5%. 1000 milliLiter(s) (100 mL/Hr) IV Continuous <Continuous>  dextrose 5%. 1000 milliLiter(s) (50 mL/Hr) IV Continuous <Continuous>  dextrose 50% Injectable 25 Gram(s) IV Push once  dextrose 50% Injectable 12.5 Gram(s) IV Push once  dextrose 50% Injectable 25 Gram(s) IV Push once  ezetimibe 10 milliGRAM(s) Oral daily  fentaNYL   Infusion. 0.5 MICROgram(s)/kG/Hr (4.17 mL/Hr) IV Continuous <Continuous>  glucagon  Injectable 1 milliGRAM(s) IntraMuscular once  insulin glargine Injectable (LANTUS) 34 Unit(s) SubCutaneous at bedtime  insulin lispro (ADMELOG) corrective regimen sliding scale   SubCutaneous every 6 hours  insulin lispro Injectable (ADMELOG) 8 Unit(s) SubCutaneous every 6 hours  metoprolol succinate ER 50 milliGRAM(s) Oral daily  pantoprazole  Injectable 40 milliGRAM(s) IV Push two times a day  polyethylene glycol 3350 17 Gram(s) Oral two times a day  propofol Infusion 5 MICROgram(s)/kG/Min (2.54 mL/Hr) IV Continuous <Continuous>  senna 2 Tablet(s) Oral at bedtime  sodium zirconium cyclosilicate 10 Gram(s) Oral daily    MEDICATIONS  (PRN):  acetaminophen     Tablet .. 650 milliGRAM(s) Oral every 6 hours PRN Temp greater or equal to 38C (100.4F), Mild Pain (1 - 3)  albuterol/ipratropium for Nebulization 3 milliLiter(s) Nebulizer every 6 hours PRN Shortness of Breath and/or Wheezing  aluminum hydroxide/magnesium hydroxide/simethicone Suspension 30 milliLiter(s) Oral every 4 hours PRN Dyspepsia  dextrose Oral Gel 15 Gram(s) Oral once PRN Blood Glucose LESS THAN 70 milliGRAM(s)/deciliter  guaiFENesin Oral Liquid (Sugar-Free) 200 milliGRAM(s) Oral every 6 hours PRN Cough  melatonin 5 milliGRAM(s) Oral at bedtime PRN Insomnia  midodrine. 5 milliGRAM(s) Oral three times a day PRN Hypotension  ondansetron Injectable 4 milliGRAM(s) IV Push every 8 hours PRN Nausea and/or Vomiting      Allergies:  Bactrim (Unknown)  sulfa drugs (Unknown)  Intolerances          REVIEW OF SYSTEMS:  General:  No weight loss, fevers, or chills.  Eyes:  No reported pain or visual changes  ENT:  No sore throat or runny nose.  NECK: No stiffness   CV:  No chest pain or palpitations.  Resp:  No shortness of breath, cough  GI:  No abdominal pain, nausea, vomiting, dysphagia, diarrhea or constipation. No rectal bleeding, melena, or hematemesis.  Neuro:  No tingling, numbness         VITAL SIGNS:   T(F): 97 (06-23-25 @ 07:06), Max: 97 (06-22-25 @ 19:00)  HR: 103 (06-23-25 @ 11:00) (69 - 120)  BP: 166/80 (06-23-25 @ 11:00) (88/49 - 192/91)  RR: 30 (06-23-25 @ 11:00) (17 - 31)  SpO2: 100% (06-23-25 @ 11:00) (90% - 100%)    PHYSICAL EXAM:  GENERAL: AAOx3, no acute distress.  HEAD:  Atraumatic, Normocephalic  EYES: conjunctiva and sclera clear  NECK: Supple, no thyromegaly  CHEST/LUNG: Clear to auscultation bilaterally; No wheeze, rhonchi, or rales  HEART: Regular rate and rhythm; normal S1, S2, No murmurs.  ABDOMEN: Soft, nontender, nondistended; Bowel sounds present  NEUROLOGY: No asterixis or tremor.   SKIN: Intact, no jaundice            LABS:                        8.9    15.55 )-----------( 194      ( 23 Jun 2025 06:20 )             32.0     06-23    148[H]  |  113[H]  |  64[HH]  ----------------------------<  242[H]  5.2[H]   |  26  |  1.4    Ca    8.1[L]      23 Jun 2025 06:20  Phos  2.9     06-23  Mg     2.4     06-23    TPro  5.2[L]  /  Alb  2.8[L]  /  TBili  0.2  /  DBili  x   /  AST  25  /  ALT  17  /  AlkPhos  52  06-23    LIVER FUNCTIONS - ( 23 Jun 2025 06:20 )  Alb: 2.8 g/dL / Pro: 5.2 g/dL / ALK PHOS: 52 U/L / ALT: 17 U/L / AST: 25 U/L / GGT: x           PT/INR - ( 23 Jun 2025 06:20 )   PT: 29.60 sec;   INR: 2.46 ratio         PTT - ( 23 Jun 2025 06:20 )  PTT:33.6 sec    IMAGING:    < from: CT Angio Abdomen and Pelvis w/ IV Cont (06.19.25 @ 16:14) >    ACC: [id number] EXAM:  CT ANGIO ABD PELV (W)AW IC   ORDERED BY: SUMEET MEJIA     PROCEDURE DATE:  06/19/2025          INTERPRETATION:  CLINICAL STATEMENT: Retroperitoneal bleed    TECHNIQUE: Contiguous axial CT images were obtained from the lower chest   to the pubic symphysis utilizing a CT angiogram protocol. Precontrast   images were obtained through the abdomen and pelvis then, CT angiogram   was performed with 95 cc of Omnipaque 350 intravenous contrast. Delayed   images were obtained as well..  95 cc administered of Omnipaque 350 (5 cc   discarded).  Oral contrast was not given.  Reformatted images in the   coronal and sagittal planes were acquired. MIP images were acquired.    COMPARISON CT: 1/3/2025    Study is severely limited in evaluation due to artifact caused by   overlying arms and motion artifact    FINDINGS:    LOWER CHEST: Cardiac pacemaker, partially imaged. Small to moderate   bilateral pleural effusions with associated opacities. See chest CT   report for chest findings..    HEPATOBILIARY: Fundal gallbladder wall thickening, which can be seen in   adenomyomatosis. Small amount of inferior perihepatic ascites.    SPLEEN: Linear hypodensity is noted in the lateral spleen on image 26 of   series 309, likely representing a splenic infarct. Portions of the spleen   are limited in evaluation due to streak artifact from adjacent hardware.    PANCREAS: Question of indeterminate pancreatic body hypodensity measuring   1 cm on image 31 series 301.    ADRENAL GLANDS: Stable left adrenal nodule measuring 2.5 cm, limited in   evaluation due to artifact, stable in size since abdominal pelvic CT of   1/21/2016. The right adrenal gland is unremarkable.    KIDNEYS: Subcentimeter hypodensities are too small to characterize.    Subcentimeter left renal calculi may be vascular. There is no   hydronephrosis.    ABDOMINOPELVIC NODES: Unremarkable...    PELVIC ORGANS: The bladder is collapsed by a Mcmahon catheter. Small amount   of pelvic ascites.    PERITONEUM/MESENTERY/BOWEL: Anterior abdominal wall postsurgical change.   Postsurgical changes at the gastroesophageal junction, limits evaluation   of adjacent structures. Nasogastric tube is present in the duodenum. A   rectal tube is present. There is a nonspecific presacral edema. There is   mild sigmoid and descending colon wall thickening with pericolonic   inflammatory changes. There is no evidence of active GI bleed however due   to large amount of hyperdense material within the cecum GI bleed in this   location is limited in evaluation. There is no free air or obstruction..    BONES: Stable compression deformity of L2. Degenerative change...    OTHER: The celiac artery, superior mesenteric artery, inferior mesenteric   artery, left renal artery, and what appears to be 2 right renal arteries   are patent. There are diffuse vascular calcifications in the abdominal   aorta is normal caliber. 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    IMPRESSION:    There is no evidence of active GI bleed however due to large amount of   hyperdense material within the cecum GI bleed in this location is limited   in evaluation.    Splenic infarct    There is mild sigmoid and descending colon wall thickening with   pericolonic inflammatory changes; possibly colitis    Small abdominal pelvic ascites    Question of indeterminate pancreatic body hypodensity measuring 1 cm on   image 31 series 301.    Small to moderate bilateral pleural effusions with associated opacities.   See chest CT report for chest findings..    --- End of Report ---            LAURA GARCIA MD; Attending Radiologist  This document has been electronically signed. Jun 20 2025 10:47AM    < end of copied text >         70yMale  Being followed for diarrhea   Interval history:  No hematemesis, melena, blood in stool reported.       PAST MEDICAL & SURGICAL HISTORY:  CHF (congestive heart failure)  Diabetes  CAD (coronary artery disease)  Chronic obstructive pulmonary disease (COPD)  HTN (hypertension)  Stented coronary artery  Dyslipidemia  GERD (gastroesophageal reflux disease)  Afib  CVA (cerebral vascular accident)  H/O heart artery stent  Heart valve replaced  aortic  History of Nissen fundoplication                Social History: No smoking. No alcohol. No illegal drug use.            MEDICATIONS  (STANDING):  aMIOdarone    Tablet 200 milliGRAM(s) Oral daily  apixaban 5 milliGRAM(s) Enteral Tube every 12 hours  atorvastatin 40 milliGRAM(s) Oral at bedtime  chlorhexidine 0.12% Liquid 15 milliLiter(s) Oral Mucosa every 12 hours  chlorhexidine 2% Cloths 1 Application(s) Topical <User Schedule>  clopidogrel Tablet 75 milliGRAM(s) Enteral Tube daily  dexMEDEtomidine Infusion 0.05 MICROgram(s)/kG/Hr (1.04 mL/Hr) IV Continuous <Continuous>  dextrose 5%. 1000 milliLiter(s) (100 mL/Hr) IV Continuous <Continuous>  dextrose 5%. 1000 milliLiter(s) (50 mL/Hr) IV Continuous <Continuous>  dextrose 50% Injectable 25 Gram(s) IV Push once  dextrose 50% Injectable 12.5 Gram(s) IV Push once  dextrose 50% Injectable 25 Gram(s) IV Push once  ezetimibe 10 milliGRAM(s) Oral daily  fentaNYL   Infusion. 0.5 MICROgram(s)/kG/Hr (4.17 mL/Hr) IV Continuous <Continuous>  glucagon  Injectable 1 milliGRAM(s) IntraMuscular once  insulin glargine Injectable (LANTUS) 34 Unit(s) SubCutaneous at bedtime  insulin lispro (ADMELOG) corrective regimen sliding scale   SubCutaneous every 6 hours  insulin lispro Injectable (ADMELOG) 8 Unit(s) SubCutaneous every 6 hours  metoprolol succinate ER 50 milliGRAM(s) Oral daily  pantoprazole  Injectable 40 milliGRAM(s) IV Push two times a day  polyethylene glycol 3350 17 Gram(s) Oral two times a day  propofol Infusion 5 MICROgram(s)/kG/Min (2.54 mL/Hr) IV Continuous <Continuous>  senna 2 Tablet(s) Oral at bedtime  sodium zirconium cyclosilicate 10 Gram(s) Oral daily    MEDICATIONS  (PRN):  acetaminophen     Tablet .. 650 milliGRAM(s) Oral every 6 hours PRN Temp greater or equal to 38C (100.4F), Mild Pain (1 - 3)  albuterol/ipratropium for Nebulization 3 milliLiter(s) Nebulizer every 6 hours PRN Shortness of Breath and/or Wheezing  aluminum hydroxide/magnesium hydroxide/simethicone Suspension 30 milliLiter(s) Oral every 4 hours PRN Dyspepsia  dextrose Oral Gel 15 Gram(s) Oral once PRN Blood Glucose LESS THAN 70 milliGRAM(s)/deciliter  guaiFENesin Oral Liquid (Sugar-Free) 200 milliGRAM(s) Oral every 6 hours PRN Cough  melatonin 5 milliGRAM(s) Oral at bedtime PRN Insomnia  midodrine. 5 milliGRAM(s) Oral three times a day PRN Hypotension  ondansetron Injectable 4 milliGRAM(s) IV Push every 8 hours PRN Nausea and/or Vomiting      Allergies:  Bactrim (Unknown)  sulfa drugs (Unknown)  Intolerances        REVIEW OF SYSTEMS:  unobtainable       VITAL SIGNS:   T(F): 97 (06-23-25 @ 07:06), Max: 97 (06-22-25 @ 19:00)  HR: 103 (06-23-25 @ 11:00) (69 - 120)  BP: 166/80 (06-23-25 @ 11:00) (88/49 - 192/91)  RR: 30 (06-23-25 @ 11:00) (17 - 31)  SpO2: 100% (06-23-25 @ 11:00) (90% - 100%)    PHYSICAL EXAM:  GENERAL: +intubated  HEAD:  Atraumatic, Normocephalic  EYES: conjunctiva and sclera clear  NECK: Supple, no thyromegaly  CHEST/LUNG: b/l rhonchi  HEART: Regular rate and rhythm; normal S1, S2, No murmurs.  ABDOMEN: Soft, nontender, nondistended; Bowel sounds present  NEUROLOGY: No asterixis or tremor.   SKIN: Intact, no jaundice            LABS:                        8.9    15.55 )-----------( 194      ( 23 Jun 2025 06:20 )             32.0     06-23    148[H]  |  113[H]  |  64[HH]  ----------------------------<  242[H]  5.2[H]   |  26  |  1.4    Ca    8.1[L]      23 Jun 2025 06:20  Phos  2.9     06-23  Mg     2.4     06-23    TPro  5.2[L]  /  Alb  2.8[L]  /  TBili  0.2  /  DBili  x   /  AST  25  /  ALT  17  /  AlkPhos  52  06-23    LIVER FUNCTIONS - ( 23 Jun 2025 06:20 )  Alb: 2.8 g/dL / Pro: 5.2 g/dL / ALK PHOS: 52 U/L / ALT: 17 U/L / AST: 25 U/L / GGT: x           PT/INR - ( 23 Jun 2025 06:20 )   PT: 29.60 sec;   INR: 2.46 ratio         PTT - ( 23 Jun 2025 06:20 )  PTT:33.6 sec    IMAGING:    < from: CT Angio Abdomen and Pelvis w/ IV Cont (06.19.25 @ 16:14) >    ACC: [id number] EXAM:  CT ANGIO ABD PELV (W)AW IC   ORDERED BY: SUMEET MEJIA     PROCEDURE DATE:  06/19/2025          INTERPRETATION:  CLINICAL STATEMENT: Retroperitoneal bleed    TECHNIQUE: Contiguous axial CT images were obtained from the lower chest   to the pubic symphysis utilizing a CT angiogram protocol. Precontrast   images were obtained through the abdomen and pelvis then, CT angiogram   was performed with 95 cc of Omnipaque 350 intravenous contrast. Delayed   images were obtained as well..  95 cc administered of Omnipaque 350 (5 cc   discarded).  Oral contrast was not given.  Reformatted images in the   coronal and sagittal planes were acquired. MIP images were acquired.    COMPARISON CT: 1/3/2025    Study is severely limited in evaluation due to artifact caused by   overlying arms and motion artifact    FINDINGS:    LOWER CHEST: Cardiac pacemaker, partially imaged. Small to moderate   bilateral pleural effusions with associated opacities. See chest CT   report for chest findings..    HEPATOBILIARY: Fundal gallbladder wall thickening, which can be seen in   adenomyomatosis. Small amount of inferior perihepatic ascites.    SPLEEN: Linear hypodensity is noted in the lateral spleen on image 26 of   series 309, likely representing a splenic infarct. Portions of the spleen   are limited in evaluation due to streak artifact from adjacent hardware.    PANCREAS: Question of indeterminate pancreatic body hypodensity measuring   1 cm on image 31 series 301.    ADRENAL GLANDS: Stable left adrenal nodule measuring 2.5 cm, limited in   evaluation due to artifact, stable in size since abdominal pelvic CT of   1/21/2016. The right adrenal gland is unremarkable.    KIDNEYS: Subcentimeter hypodensities are too small to characterize.    Subcentimeter left renal calculi may be vascular. There is no   hydronephrosis.    ABDOMINOPELVIC NODES: Unremarkable...    PELVIC ORGANS: The bladder is collapsed by a Mcmahon catheter. Small amount   of pelvic ascites.    PERITONEUM/MESENTERY/BOWEL: Anterior abdominal wall postsurgical change.   Postsurgical changes at the gastroesophageal junction, limits evaluation   of adjacent structures. Nasogastric tube is present in the duodenum. A   rectal tube is present. There is a nonspecific presacral edema. There is   mild sigmoid and descending colon wall thickening with pericolonic   inflammatory changes. There is no evidence of active GI bleed however due   to large amount of hyperdense material within the cecum GI bleed in this   location is limited in evaluation. There is no free air or obstruction..    BONES: Stable compression deformity of L2. Degenerative change...    OTHER: The celiac artery, superior mesenteric artery, inferior mesenteric   artery, left renal artery, and what appears to be 2 right renal arteries   are patent. There are diffuse vascular calcifications in the abdominal   aorta is normal caliber. 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    IMPRESSION:    There is no evidence of active GI bleed however due to large amount of   hyperdense material within the cecum GI bleed in this location is limited   in evaluation.    Splenic infarct    There is mild sigmoid and descending colon wall thickening with   pericolonic inflammatory changes; possibly colitis    Small abdominal pelvic ascites    Question of indeterminate pancreatic body hypodensity measuring 1 cm on   image 31 series 301.    Small to moderate bilateral pleural effusions with associated opacities.   See chest CT report for chest findings..    --- End of Report ---            LAURA GARCIA MD; Attending Radiologist  This document has been electronically signed. Jun 20 2025 10:47AM    < end of copied text >

## 2025-06-23 NOTE — PROGRESS NOTE ADULT - ASSESSMENT
70 year old male with past medical history of  COPD (on 2L home O2), DM 2  HFrEF 30%, CAD,  ischemic cardiomyopathy s/p CRT-D, HTN, paroxysmal A-fib (status post ablation 1/14/25) on amiodarone and  Eliquis who presented to ED w c/o shortness of breath and b/l  leg swelling.     #Acute on chronic HFrEF decompensated heart failure EF 31%   #Anemia   #Acute pulmonary edema  #COPD, not in active exacerbation  #pulm HTN multifactorial secondary to cardiomyopathy reduced EF, likely LAW, baseline COPD  #chronic hypoxia from all the above  #B/L small pleural effusions due to pulmonary edema s/p intubation   - Unclear cause of fevers- Possible drug induced vs Ischemic Colitis  - Cardiology follow up for diuretics   - D/C Vanc/Cefepime (6/23). Received full course of abx.  - Off loading to prevent pressure sores and preventive measures to avoid aspiration.  - TO  - [facility]. pt previously opted for dnr, dni on prior MOLST but was intubated on this admission   - Palliative fu   daily abgs and x-ray chest while intubated       DVT PPX: eliques  GI PPX: pantoprazole   DIET: tube feed    ACTIVITY:   CODE STATUS: on prior molst pt is dnr, dni   DISPOSITION:     PENDING:   diuretics, code status

## 2025-06-23 NOTE — PROGRESS NOTE ADULT - ASSESSMENT
70yMale with past medical history of  COPD (on 2L home O2), DM 2  HFrEF 30%, CAD,  ischemic cardiomyopathy s/p CRT-D, HTN, paroxysmal A-fib (status post ablation 1/14/25) on amiodarone and  Eliquis who presented  for b/l leg swelling patient admitted with acute respiratory failure, sepsis, possible aspiration pneumonia and new diarrhea. Patient with digni-shield in.  No hematemesis, melena, blood in stool reported, patient had positive fecal occult blood test.     #Acute Diarrhea--->resolved  #anemia no gross GI bleeding  #fecal occult blood test positive  #leukocytosis  #There is mild sigmoid and descending colon wall thickening with   pericolonic inflammatory changes; possibly colitis  Rec  -check GI PCR if diarrhea recurs   -C-diff WNL  -can resume feeds as tolerated  -can continue AC if needed  -will eventually benefit from Colonoscopy   -Maintain Hemodynamic Stability   -Monitor CBC  -CMP,Optimize Electrolytes  -PT,PTT,INR  -EKG, Chest-Xray   -Transfuse prn to hgb >8  -Two large bore IV lines  -PPI BID  -Monitor Vital Signs  -Monitor Stool For blood, frequency, consistency, melena  -Active Type and Screen  -Iron Studies, Folate, Vitamin B12 levels  - Follow up with our [facility] located at [address] [place] [address]. Phone Number: [phone] for outpatient EGD/Colonoscopy      #Splenic infarct  Rec  -consider vascular eval    #Question of indeterminate pancreatic body hypodensity measuring 1 cm on   image 31 series 301  Rec  -outpatient consider MRI pancreatic protocol     #Small to moderate bilateral pleural effusions with associated opacities.   Rec  - Care as per primary team

## 2025-06-23 NOTE — PROGRESS NOTE ADULT - SUBJECTIVE AND OBJECTIVE BOX
SERENAMONIQUE  70y, Male    LOS  9d    INTERVAL EVENTS/HPI  - T(F): , Max: 98.2 (06-22-25 @ 07:00)  - WBC Count: 14.25 (06-22-25 @ 06:23)  WBC Count: 11.92 (06-21-25 @ 06:32)  - Creatinine: 1.3 (06-22-25 @ 06:23)  Creatinine: 1.6 (06-21-25 @ 06:32)    REVIEW OF SYSTEMS:  cannot obtain further history from the patient secondary to altered mental status or sedation      ANTIMICROBIALS:   cefepime   IVPB    cefepime   IVPB 2000 every 12 hours  vancomycin  IVPB 1250 every 24 hours  vancomycin  IVPB      MEDICATIONS  (STANDING):  azithromycin  IVPB   250 mL/Hr IV Intermittent (06-14-25 @ 12:36)    cefepime   IVPB   100 mL/Hr IV Intermittent (06-20-25 @ 05:32)   100 mL/Hr IV Intermittent (06-19-25 @ 17:56)   100 mL/Hr IV Intermittent (06-19-25 @ 05:58)   100 mL/Hr IV Intermittent (06-18-25 @ 18:02)   100 mL/Hr IV Intermittent (06-18-25 @ 05:59)   100 mL/Hr IV Intermittent (06-17-25 @ 17:29)    cefepime   IVPB   100 mL/Hr IV Intermittent (06-17-25 @ 06:06)   100 mL/Hr IV Intermittent (06-16-25 @ 17:40)    cefepime   IVPB   100 mL/Hr IV Intermittent (06-22-25 @ 07:49)    cefepime   IVPB   100 mL/Hr IV Intermittent (06-23-25 @ 05:21)   100 mL/Hr IV Intermittent (06-22-25 @ 17:11)    cefepime   IVPB   100 mL/Hr IV Intermittent (06-16-25 @ 05:19)   100 mL/Hr IV Intermittent (06-15-25 @ 17:22)   100 mL/Hr IV Intermittent (06-15-25 @ 05:17)   100 mL/Hr IV Intermittent (06-14-25 @ 18:54)    cefTRIAXone   IVPB   100 mL/Hr IV Intermittent (06-14-25 @ 12:36)    vancomycin  IVPB   166.67 mL/Hr IV Intermittent (06-22-25 @ 07:59)    vancomycin  IVPB   166.67 mL/Hr IV Intermittent (06-14-25 @ 17:19)    vancomycin  IVPB   250 mL/Hr IV Intermittent (06-15-25 @ 17:25)    vancomycin  IVPB   250 mL/Hr IV Intermittent (06-18-25 @ 20:45)   250 mL/Hr IV Intermittent (06-17-25 @ 17:30)    vancomycin  IVPB   250 mL/Hr IV Intermittent (06-19-25 @ 21:02)        OTHER MEDS: MEDICATIONS  (STANDING):  acetaminophen     Tablet .. 650 every 6 hours PRN  albuterol/ipratropium for Nebulization 3 every 6 hours PRN  aluminum hydroxide/magnesium hydroxide/simethicone Suspension 30 every 4 hours PRN  aMIOdarone    Tablet 200 daily  apixaban 5 every 12 hours  atorvastatin 40 at bedtime  clopidogrel Tablet 75 daily  dexMEDEtomidine Infusion 0.05 <Continuous>  dextrose 50% Injectable 25 once  dextrose 50% Injectable 12.5 once  dextrose 50% Injectable 25 once  dextrose Oral Gel 15 once PRN  ezetimibe 10 daily  fentaNYL   Infusion. 0.5 <Continuous>  glucagon  Injectable 1 once  guaiFENesin Oral Liquid (Sugar-Free) 200 every 6 hours PRN  insulin glargine Injectable (LANTUS) 34 at bedtime  insulin lispro (ADMELOG) corrective regimen sliding scale  every 6 hours  insulin lispro Injectable (ADMELOG) 8 every 6 hours  melatonin 5 at bedtime PRN  midodrine. 5 three times a day PRN  ondansetron Injectable 4 every 8 hours PRN  pantoprazole  Injectable 40 two times a day  propofol Infusion 5 <Continuous>      Vital Signs Last 24 Hrs  T(F): 97 (06-22-25 @ 23:01), Max: 102.9 (06-16-25 @ 23:01)    Vital Signs Last 24 Hrs  HR: 78 (06-23-25 @ 05:00) (70 - 109)  BP: 106/53 (06-23-25 @ 05:00) (86/54 - 177/80)  RR: 21 (06-23-25 @ 05:00)  SpO2: 99% (06-23-25 @ 05:00) (90% - 100%)  Wt(kg): --    EXAM:  GENERAL: On MV  HEAD: No head lesions  NECK: Supple  CHEST/LUNG: Shallow breath sounds   HEART: S1 S2  ABDOMEN: Soft, nontender +Mcmahon  EXTREMITIES: No clubbing  MSK: +1 edema   SKIN: No rashes or lesions, no superficial thrombophlebitis    Labs:                        9.5    14.25 )-----------( 202 ( 22 Jun 2025 06:23 )             32.8     06-22    149[H]  |  113[H]  |  60[H]  ----------------------------<  226[H]  5.1[H]   |  27  |  1.3    Ca    8.7      22 Jun 2025 06:23  Phos  2.7     06-21  Mg     2.4     06-22    TPro  5.6[L]  /  Alb  2.8[L]  /  TBili  0.3  /  DBili  x   /  AST  13  /  ALT  9   /  AlkPhos  53  06-22      WBC Trend:  WBC Count: 14.25 (06-22-25 @ 06:23)  WBC Count: 11.92 (06-21-25 @ 06:32)  WBC Count: 17.19 (06-20-25 @ 19:26)  WBC Count: 12.36 (06-20-25 @ 06:16)      Creatine Trend:  Creatinine: 1.3 (06-22)  Creatinine: 1.6 (06-21)  Creatinine: 1.5 (06-20)  Creatinine: 1.5 (06-19)      Liver Biochemical Testing Trend:  Alanine Aminotransferase (ALT/SGPT): 9 (06-22)  Alanine Aminotransferase (ALT/SGPT): 8 (06-21)  Alanine Aminotransferase (ALT/SGPT): 9 (06-20)  Alanine Aminotransferase (ALT/SGPT): 8 (06-19)  Alanine Aminotransferase (ALT/SGPT): 9 (06-18)  Aspartate Aminotransferase (AST/SGOT): 13 (06-22-25 @ 06:23)  Aspartate Aminotransferase (AST/SGOT): 11 (06-21-25 @ 06:32)  Aspartate Aminotransferase (AST/SGOT): 12 (06-20-25 @ 06:16)  Aspartate Aminotransferase (AST/SGOT): 12 (06-19-25 @ 06:27)  Aspartate Aminotransferase (AST/SGOT): 22 (06-18-25 @ 13:40)  Bilirubin Total: 0.3 (06-22)  Bilirubin Total: 0.3 (06-21)  Bilirubin Total: 0.4 (06-20)  Bilirubin Total: 0.3 (06-19)  Bilirubin Total: 0.6 (06-18)      Trend LDH  06-15-25 @ 06:45  375[H]      Urinalysis Basic - ( 22 Jun 2025 06:23 )    Color: x / Appearance: x / SG: x / pH: x  Gluc: 226 mg/dL / Ketone: x  / Bili: x / Urobili: x   Blood: x / Protein: x / Nitrite: x   Leuk Esterase: x / RBC: x / WBC x   Sq Epi: x / Non Sq Epi: x / Bacteria: x        MICROBIOLOGY:  Vancomycin Level, Trough: 14.5 (06-20 @ 06:16)  Vancomycin Level, Random: 11.4 (06-19 @ 06:27)  Vancomycin Level, Trough: 7.0 (06-18 @ 18:00)  Vancomycin Level, Trough: 8.1 (06-17 @ 11:01)  Vancomycin Level, Random: 14.5 (06-15 @ 06:45)    Male    Culture - Blood (collected 16 Jun 2025 06:28)  Source: Blood Blood  Final Report:    No growth at 5 days    Culture - Sputum (collected 15 Kleber 2025 16:44)  Source: Sputum Sputum  Final Report:    Commensal magda consistent with body site    Culture - Blood (collected 14 Jun 2025 15:36)  Source: Blood None  Final Report:    No growth at 5 days    Culture - Blood (collected 02 Jun 2025 18:05)  Source: Blood Blood  Final Report:    No growth at 5 days    Culture - Blood (collected 02 Jun 2025 18:05)  Source: Blood Blood  Final Report:    No growth at 5 days    Culture - Blood (collected 18 Apr 2025 22:18)  Source: Blood Blood  Final Report:    No growth at 5 days    Culture - Blood (collected 18 Apr 2025 22:17)  Source: Blood Blood  Final Report:    No growth at 5 days    Culture - Blood (collected 28 Mar 2025 20:45)  Source: Blood Blood-Peripheral  Final Report:    No growth at 5 days    Culture - Blood (collected 31 Dec 2024 15:45)  Source: .Blood BLOOD  Final Report:    No growth at 5 days    Culture - Blood (collected 03 Sep 2023 12:50)  Source: .Blood Blood  Final Report:    No growth at 5 days      HIV-1/2 Combo Result: Nonreact (06-16-25 @ 06:28)  HIV-1/2 Combo Result: Nonreact (01-04-25 @ 05:58)                    Fungitell: 108 pg/mL (06-16 @ 06:28)    COVID-19 PCR: NotDetec (05-15-25 @ 09:50)          Ferritin: 265 (06-20)            Blood Gas Arterial, Lactate: 0.6 (06-23 @ 05:16)  Blood Gas Arterial, Lactate: 0.6 (06-22 @ 05:24)  Blood Gas Arterial, Lactate: 0.8 (06-21 @ 04:30)        RADIOLOGY & ADDITIONAL TESTS:  I have personally reviewed the relevant images.   CXR      CT    < from: Xray Chest 1 View- PORTABLE-Routine (Xray Chest 1 View- PORTABLE-Routine in AM.) (06.21.25 @ 07:28) >    IMPRESSION:    Right opacity/pleural effusion, increased. Redemonstration left opacity   and cardiomegaly.    --- End of Report ---    < end of copied text >      WEIGHT  Weight (kg): 83.3 (06-14-25 @ 08:00)  Creatinine: 1.3 mg/dL (06-22-25 @ 06:23)      All available historical records have been reviewed

## 2025-06-23 NOTE — PROGRESS NOTE ADULT - ASSESSMENT
IMPRESSION:    Acute on chronic HFrEF decompensated heart failure EF 31%   Anemia   Acute pulmonary edema  COPD, not in active exacerbation  pulm HTN multifactorial secondary to cardiomyopathy reduced EF, likely LAW, baseline COPD  chronic hypoxia from all the above  B/L small pleural effusions due to pulmonary edema      PLAN:    CNS: Daily SAT, Seroquel QHS if needed for agitation     HEENT: Oral care    PULMONARY:  HOB @ 45 degrees. CXR stat now, Duonebs q 6 hrs PRN, SBT if passes SAT      CARDIOVASCULAR: TTE noted , volume contraction as tolerated, Cardiology follow up, f/u IVC, if full do Bumex 1mg BID    GI: GI prophylaxis. Feeding. C. Diff GI PCR negative, trend Hb stable, f/u CT abdomen w/ no active bleed     RENAL:  Follow up lytes. Correct as needed, Senna and Miralax for BM, Lokelma for hyperK     INFECTIOUS DISEASE: Follow up cultures, MRSA +, continue  vancomycin and cefepime for now    HEMATOLOGICAL: On AC, okay per GI, f/u Us duplex LE with no DVT     ENDOCRINE:  Follow up FS. Insulin protocol if needed.    MUSCULOSKELETAL: off loading     Palliative follow up  MICU monitoring      IMPRESSION:    Acute on chronic HFrEF decompensated heart failure EF 31%   Anemia   Acute pulmonary edema  COPD, not in active exacerbation  pulm HTN multifactorial secondary to cardiomyopathy reduced EF, likely LAW, baseline COPD  chronic hypoxia from all the above  B/L small pleural effusions due to pulmonary edema      PLAN:    CNS: Daily SAT, Seroquel QHS if needed for agitation     HEENT: Oral care    PULMONARY:  HOB @ 45 degrees. CXR stat now, Duonebs q 6 hrs PRN, SBT if passes SAT      CARDIOVASCULAR: TTE noted , volume contraction as tolerated, Cardiology follow up, bumex 1 mg Q24 hrs     GI: GI prophylaxis. Feeding. C. Diff GI PCR negative, trend Hb stable, f/u CT abdomen w/ no active bleed     RENAL:  Follow up lytes. Correct as needed, Senna and Miralax for BM, Lokelma for hyperK     INFECTIOUS DISEASE: Follow up cultures, MRSA +, continue  vancomycin and cefepime for now    HEMATOLOGICAL: On AC, okay per GI, f/u Us duplex LE with no DVT     ENDOCRINE:  Follow up FS. Insulin protocol if needed.    MUSCULOSKELETAL: off loading     Palliative follow up  MICU monitoring

## 2025-06-23 NOTE — PROGRESS NOTE ADULT - ASSESSMENT
This is a 70 year old male with past medical history of  COPD (on 2L home O2), DM 2  HFrEF 30%, CAD,  ischemic cardiomyopathy s/p CRT-D, HTN, paroxysmal A-fib (status post ablation 1/14/25) on amiodarone and  Eliquis who presented to ED w c/o LESLIE and b/l  leg swelling.    IMPRESSION  #Chronic Leukocytosis- Now Worsened- Possible leukemoid reaction  #Acute on chronic hypoxic respiratory failure  #Heart failure with reduced EF, Acute on chronic exacerbation  #Large Bilateral pleural effusions/Pulmonary edema- Improving  #Can not rule out PNA vs Aspiration  #Anemia- Possible ischemic in nature resulting in loose stools  #CT abdomen noted for colitis- Suspect Ischemic colitis.   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  #Obesity BMI (kg/m2): 32.5, 28.7, 29.6, 30.1    Recently Enterovirus Positive Discharged on PO steroids Vantin+Doxy    Covid/Flu/RSV negative  MRSA nares positive  , Fungitell 108 (very mildly elevated)   Blood cultures NGTD  Sputum cultures Normal Respiratory magda  C.diff negative     RECOMMENDATIONS  -Unclear cause of fevers- Possible drug induced vs Ischemic Colitis  -Cardiology follow up. Diuresis.  -D/C Vanc/Cefepime (6/23). Received full course of abx.  -Off loading to prevent pressure sores and preventive measures to avoid aspiration. TOV. GOC. Recurrent admissions expected.     If any questions, please send a message or call on [employer] [facility] Teams  Please continue to update ID with any pertinent new laboratory or radiographic findings.    Benigno Pond M.D  Infectious Diseases Attending/   [employer] at [facility]

## 2025-06-24 NOTE — PROGRESS NOTE ADULT - SUBJECTIVE AND OBJECTIVE BOX
Patient is a 70y old  Male who presents with a chief complaint of SOB (23 Jun 2025 12:42)        Over Night Events: Febrile overnight         ROS:     All ROS are negative except HPI         PHYSICAL EXAM    ICU Vital Signs Last 24 Hrs  T(C): 37.7 (24 Jun 2025 07:01), Max: 38.8 (23 Jun 2025 23:00)  T(F): 99.9 (24 Jun 2025 07:01), Max: 101.9 (23 Jun 2025 23:00)  HR: 74 (24 Jun 2025 06:00) (70 - 120)  BP: 103/55 (24 Jun 2025 06:00) (95/50 - 192/91)  BP(mean): 74 (24 Jun 2025 06:00) (68 - 130)  ABP: --  ABP(mean): --  RR: 24 (24 Jun 2025 07:01) (21 - 72)  SpO2: 99% (24 Jun 2025 06:00) (99% - 100%)    O2 Parameters below as of 23 Jun 2025 22:00  Patient On (Oxygen Delivery Method): ventilator        CONSTITUTIONAL:  NAD    ENT:   Airway patent,   ET    EYES:   Pupils equal,   Round and reactive to light.    CARDIAC:   Normal rate,   Regular rhythm.      Vascular:  Normal systolic impulse  No Carotid bruits    RESPIRATORY:   No wheezing  Bilateral BS    GASTROINTESTINAL:  Abdomen soft,   Non-tender,   No guarding,   + BS    MUSCULOSKELETAL:   No clubbing, cyanosis    NEUROLOGICAL:   Intubated   No motor  deficits.    SKIN:   Skin normal color for race,   Warm and dry and intact.   No evidence of rash.          06-23-25 @ 07:01  -  06-24-25 @ 07:00  --------------------------------------------------------  IN:    Dexmedetomidine: 560 mL    Enteral Tube Flush: 90 mL    FentaNYL: 328.8 mL    Free Water: 450 mL    Peptamen A.F.: 1387 mL  Total IN: 2815.8 mL    OUT:    Indwelling Catheter - Urethral (mL): 1520 mL    Propofol: 0 mL    Rectal Tube (mL): 0 mL  Total OUT: 1520 mL    Total NET: 1295.8 mL          LABS:                            9.8    17.97 )-----------( 202      ( 24 Jun 2025 06:09 )             34.7                                               06-24    148[H]  |  113[H]  |  72[HH]  ----------------------------<  219[H]  5.0   |  27  |  1.4    Ca    8.0[L]      24 Jun 2025 06:09  Phos  2.9     06-23  Mg     2.5     06-24    TPro  5.0[L]  /  Alb  2.6[L]  /  TBili  0.2  /  DBili  x   /  AST  51[H]  /  ALT  36  /  AlkPhos  59  06-24      PT/INR - ( 23 Jun 2025 06:20 )   PT: 29.60 sec;   INR: 2.46 ratio         PTT - ( 23 Jun 2025 06:20 )  PTT:33.6 sec                                       Urinalysis Basic - ( 24 Jun 2025 06:09 )    Color: x / Appearance: x / SG: x / pH: x  Gluc: 219 mg/dL / Ketone: x  / Bili: x / Urobili: x   Blood: x / Protein: x / Nitrite: x   Leuk Esterase: x / RBC: x / WBC x   Sq Epi: x / Non Sq Epi: x / Bacteria: x                                                  LIVER FUNCTIONS - ( 24 Jun 2025 06:09 )  Alb: 2.6 g/dL / Pro: 5.0 g/dL / ALK PHOS: 59 U/L / ALT: 36 U/L / AST: 51 U/L / GGT: x                                                                                               Mode: AC/ CMV (Assist Control/ Continuous Mandatory Ventilation)  RR (machine): 24  TV (machine): 400  FiO2: 40  PEEP: 8  MAP: 10  PIP: 19                                      ABG - ( 24 Jun 2025 04:16 )  pH, Arterial: 7.34  pH, Blood: x     /  pCO2: 55    /  pO2: 136   / HCO3: 30    / Base Excess: 3.1   /  SaO2: 99.7                MEDICATIONS  (STANDING):  aMIOdarone    Tablet 200 milliGRAM(s) Oral daily  apixaban 5 milliGRAM(s) Enteral Tube every 12 hours  atorvastatin 40 milliGRAM(s) Oral at bedtime  chlorhexidine 0.12% Liquid 15 milliLiter(s) Oral Mucosa every 12 hours  chlorhexidine 2% Cloths 1 Application(s) Topical <User Schedule>  clopidogrel Tablet 75 milliGRAM(s) Enteral Tube daily  dexMEDEtomidine Infusion 0.05 MICROgram(s)/kG/Hr (1.04 mL/Hr) IV Continuous <Continuous>  dextrose 5%. 1000 milliLiter(s) (100 mL/Hr) IV Continuous <Continuous>  dextrose 5%. 1000 milliLiter(s) (50 mL/Hr) IV Continuous <Continuous>  dextrose 50% Injectable 25 Gram(s) IV Push once  dextrose 50% Injectable 12.5 Gram(s) IV Push once  dextrose 50% Injectable 25 Gram(s) IV Push once  ezetimibe 10 milliGRAM(s) Oral daily  fentaNYL   Infusion. 0.5 MICROgram(s)/kG/Hr (4.17 mL/Hr) IV Continuous <Continuous>  glucagon  Injectable 1 milliGRAM(s) IntraMuscular once  insulin glargine Injectable (LANTUS) 34 Unit(s) SubCutaneous at bedtime  insulin lispro (ADMELOG) corrective regimen sliding scale   SubCutaneous every 6 hours  insulin lispro Injectable (ADMELOG) 8 Unit(s) SubCutaneous every 6 hours  metoprolol tartrate 25 milliGRAM(s) Oral two times a day  pantoprazole  Injectable 40 milliGRAM(s) IV Push two times a day  propofol Infusion 5 MICROgram(s)/kG/Min (2.54 mL/Hr) IV Continuous <Continuous>  senna 2 Tablet(s) Oral at bedtime  sodium zirconium cyclosilicate 10 Gram(s) Oral daily    MEDICATIONS  (PRN):  acetaminophen     Tablet .. 650 milliGRAM(s) Oral every 6 hours PRN Temp greater or equal to 38C (100.4F), Mild Pain (1 - 3)  albuterol/ipratropium for Nebulization 3 milliLiter(s) Nebulizer every 6 hours PRN Shortness of Breath and/or Wheezing  aluminum hydroxide/magnesium hydroxide/simethicone Suspension 30 milliLiter(s) Oral every 4 hours PRN Dyspepsia  dextrose Oral Gel 15 Gram(s) Oral once PRN Blood Glucose LESS THAN 70 milliGRAM(s)/deciliter  guaiFENesin Oral Liquid (Sugar-Free) 200 milliGRAM(s) Oral every 6 hours PRN Cough  melatonin 5 milliGRAM(s) Oral at bedtime PRN Insomnia  midodrine. 5 milliGRAM(s) Oral three times a day PRN Hypotension  ondansetron Injectable 4 milliGRAM(s) IV Push every 8 hours PRN Nausea and/or Vomiting      New X-rays reviewed:                                                                                  ECHO

## 2025-06-24 NOTE — PROGRESS NOTE ADULT - SUBJECTIVE AND OBJECTIVE BOX
SERENAMONIQUE  70y, Male    LOS  10d    INTERVAL EVENTS/HPI  - T(F): , Max: 101.9 (06-23-25 @ 23:00)  - WBC Count: 17.97 (06-24-25 @ 06:09)  WBC Count: 15.55 (06-23-25 @ 06:20)  - Creatinine: 1.4 (06-24-25 @ 06:09)  Creatinine: 1.4 (06-23-25 @ 06:20)    REVIEW OF SYSTEMS: cannot obtain further history from the patient secondary to altered mental status or sedation    ANTIMICROBIALS:       OTHER MEDS: MEDICATIONS  (STANDING):  acetaminophen     Tablet .. 650 every 6 hours PRN  albuterol/ipratropium for Nebulization 3 every 6 hours PRN  aluminum hydroxide/magnesium hydroxide/simethicone Suspension 30 every 4 hours PRN  aMIOdarone    Tablet 200 daily  apixaban 5 every 12 hours  atorvastatin 40 at bedtime  bumetanide Injectable 1 two times a day  clopidogrel Tablet 75 daily  dextrose 50% Injectable 25 once  dextrose 50% Injectable 12.5 once  dextrose 50% Injectable 25 once  dextrose Oral Gel 15 once PRN  ezetimibe 10 daily  fentaNYL   Infusion. 0.5 <Continuous>  glucagon  Injectable 1 once  guaiFENesin Oral Liquid (Sugar-Free) 200 every 6 hours PRN  insulin glargine Injectable (LANTUS) 34 at bedtime  insulin lispro (ADMELOG) corrective regimen sliding scale  every 6 hours  insulin lispro Injectable (ADMELOG) 8 every 6 hours  melatonin 5 at bedtime PRN  metoprolol tartrate 25 two times a day  midodrine. 5 three times a day PRN  ondansetron Injectable 4 every 8 hours PRN  pantoprazole  Injectable 40 two times a day  propofol Infusion 5 <Continuous>  senna 2 at bedtime      Vital Signs Last 24 Hrs  T(F): 99.9 (06-24-25 @ 07:01), Max: 101.9 (06-23-25 @ 23:00)    Vital Signs Last 24 Hrs  HR: 72 (06-24-25 @ 08:37) (70 - 103)  BP: 100/58 (06-24-25 @ 08:00) (95/50 - 166/80)  RR: 24 (06-24-25 @ 08:00)  SpO2: 98% (06-24-25 @ 08:37) (98% - 100%)  Wt(kg): --    EXAM:  GENERAL: On MV  HEAD: No head lesions  NECK: Supple  CHEST/LUNG: Shallow breath sounds   HEART: S1 S2  ABDOMEN: Soft, nontender +Mcmahon  EXTREMITIES: No clubbing  MSK: +1 edema   SKIN: No rashes or lesions, no superficial thrombophlebitis    Labs:                        9.8    17.97 )-----------( 202      ( 24 Jun 2025 06:09 )             34.7     06-24    148[H]  |  113[H]  |  72[HH]  ----------------------------<  219[H]  5.0   |  27  |  1.4    Ca    8.0[L]      24 Jun 2025 06:09  Phos  2.9     06-23  Mg     2.5     06-24    TPro  5.0[L]  /  Alb  2.6[L]  /  TBili  0.2  /  DBili  x   /  AST  51[H]  /  ALT  36  /  AlkPhos  59  06-24      WBC Trend:  WBC Count: 17.97 (06-24-25 @ 06:09)  WBC Count: 15.55 (06-23-25 @ 06:20)  WBC Count: 14.25 (06-22-25 @ 06:23)  WBC Count: 11.92 (06-21-25 @ 06:32)      Creatine Trend:  Creatinine: 1.4 (06-24)  Creatinine: 1.4 (06-23)  Creatinine: 1.3 (06-22)  Creatinine: 1.6 (06-21)      Liver Biochemical Testing Trend:  Alanine Aminotransferase (ALT/SGPT): 36 (06-24)  Alanine Aminotransferase (ALT/SGPT): 17 (06-23)  Alanine Aminotransferase (ALT/SGPT): 9 (06-22)  Alanine Aminotransferase (ALT/SGPT): 8 (06-21)  Alanine Aminotransferase (ALT/SGPT): 9 (06-20)  Aspartate Aminotransferase (AST/SGOT): 51 (06-24-25 @ 06:09)  Aspartate Aminotransferase (AST/SGOT): 25 (06-23-25 @ 06:20)  Aspartate Aminotransferase (AST/SGOT): 13 (06-22-25 @ 06:23)  Aspartate Aminotransferase (AST/SGOT): 11 (06-21-25 @ 06:32)  Aspartate Aminotransferase (AST/SGOT): 12 (06-20-25 @ 06:16)  Bilirubin Total: 0.2 (06-24)  Bilirubin Total: 0.2 (06-23)  Bilirubin Total: 0.3 (06-22)  Bilirubin Total: 0.3 (06-21)  Bilirubin Total: 0.4 (06-20)      Trend LDH  06-15-25 @ 06:45  375[H]      Urinalysis Basic - ( 24 Jun 2025 06:09 )    Color: x / Appearance: x / SG: x / pH: x  Gluc: 219 mg/dL / Ketone: x  / Bili: x / Urobili: x   Blood: x / Protein: x / Nitrite: x   Leuk Esterase: x / RBC: x / WBC x   Sq Epi: x / Non Sq Epi: x / Bacteria: x        MICROBIOLOGY:  Vancomycin Level, Trough: 14.5 (06-20 @ 06:16)  Vancomycin Level, Random: 11.4 (06-19 @ 06:27)  Vancomycin Level, Trough: 7.0 (06-18 @ 18:00)  Vancomycin Level, Trough: 8.1 (06-17 @ 11:01)  Vancomycin Level, Random: 14.5 (06-15 @ 06:45)    Male    Culture - Blood (collected 16 Jun 2025 06:28)  Source: Blood Blood  Final Report:    No growth at 5 days    Culture - Sputum (collected 15 Kleber 2025 16:44)  Source: Sputum Sputum  Final Report:    Commensal magda consistent with body site    Culture - Blood (collected 14 Jun 2025 15:36)  Source: Blood None  Final Report:    No growth at 5 days    Culture - Blood (collected 02 Jun 2025 18:05)  Source: Blood Blood  Final Report:    No growth at 5 days    Culture - Blood (collected 02 Jun 2025 18:05)  Source: Blood Blood  Final Report:    No growth at 5 days    Culture - Blood (collected 18 Apr 2025 22:18)  Source: Blood Blood  Final Report:    No growth at 5 days    Culture - Blood (collected 18 Apr 2025 22:17)  Source: Blood Blood  Final Report:    No growth at 5 days    Culture - Blood (collected 28 Mar 2025 20:45)  Source: Blood Blood-Peripheral  Final Report:    No growth at 5 days    Culture - Blood (collected 31 Dec 2024 15:45)  Source: .Blood BLOOD  Final Report:    No growth at 5 days    Culture - Blood (collected 03 Sep 2023 12:50)  Source: .Blood Blood  Final Report:    No growth at 5 days      HIV-1/2 Combo Result: Nonreact (06-16-25 @ 06:28)  HIV-1/2 Combo Result: Nonreact (01-04-25 @ 05:58)                      COVID-19 PCR: NotDetec (05-15-25 @ 09:50)          Ferritin: 265 (06-20)            Blood Gas Arterial, Lactate: 0.6 (06-24 @ 04:16)  Blood Gas Arterial, Lactate: 0.6 (06-23 @ 18:35)  Blood Gas Arterial, Lactate: 0.6 (06-23 @ 05:16)  Blood Gas Arterial, Lactate: 0.6 (06-22 @ 05:24)        RADIOLOGY & ADDITIONAL TESTS:  I have personally reviewed the relevant images.   CXR  Xray Chest 1 View- PORTABLE-Urgent:   ACC: [id number] EXAM:  XR CHEST PORTABLE URGENT 1V   ORDERED BY: TOSHA NEVAREZ     PROCEDURE DATE:  06/23/2025          INTERPRETATION:  CLINICAL HISTORY: Follow-up.    COMPARISON: Chest radiograph 6/21/2025.    TECHNIQUE: Frontal chest radiograph.    FINDINGS:    Support devices: ET tube terminates above the oscar. Enteric tube   courses below the hemidiaphragm. Left chest AICD.    Cardiac/mediastinum/hilum: Unchanged.    Lung parenchyma/Pleura: Essentially stable bilateral opacities. No   definite pneumothorax    Skeleton/soft tissues: Unchanged.      IMPRESSION:    Essentially stable bilateral opacities.    --- End of Report ---            OTIS KATE MD; Attending Radiologist  This document has been electronically signed. Jun 23 2025 12:06PM (06-23-25 @ 09:21)      CT        WEIGHT  Weight (kg): 83.3 (06-14-25 @ 08:00)  Creatinine: 1.4 mg/dL (06-24-25 @ 06:09)      All available historical records have been reviewed

## 2025-06-24 NOTE — PROGRESS NOTE ADULT - ASSESSMENT
70 year old male with media l history of CKD3,  COPD on 2L home O2, diabetes, chronic  HFrEF  s/p CRT-D, AVR, hypertension, paroxysmal A-fib (status post ablation 1/14/25)  CAD admitted  for dyspnea on exertion and leg swelling.     acute respiratory failure / sepsis  / aspiration pneumonia / fluid overload     - completed antibiotic course - febrile - ID f/u   - check sputum cultures   - continue Bumex    - maintain even to negative fluid balance   - insulin sq hospital protocol   - SAT and if tolerates -> SBT   - 50 minutes total spent today on patient care .

## 2025-06-24 NOTE — PROGRESS NOTE ADULT - ASSESSMENT
IMPRESSION:    Acute on chronic HFrEF decompensated heart failure EF 31%   Anemia, stable    Acute pulmonary edema  COPD, not in active exacerbation  pulm HTN multifactorial secondary to cardiomyopathy reduced EF, likely LAW, baseline COPD  chronic hypoxia from all the above  Small pleural effusions due to pulmonary edema      PLAN:    CNS: Daily SAT, Seroquel QHS if needed for agitation, Dc PRECEDEX and watch fever curve     HEENT: Oral care    PULMONARY:  HOB @ 45 degrees. CXR with worse BL effusions, Resume diuresis. Duonebs q 6 hrs PRN, SBT if passes SAT      CARDIOVASCULAR: TTE noted , volume contraction as tolerated, Cardiology follow up, bumex 1 mg BID     GI: GI prophylaxis. Feeding. C. Diff GI PCR negative, trend Hb stable, f/u CT abdomen w/ no active bleed     RENAL:  Follow up lytes. Correct as needed, Senna and Miralax for BM, Lokelma for hyperK     INFECTIOUS DISEASE: Follow up cultures, MRSA +,  finished course of vancomycin and cefepime, f/u ID reccs     HEMATOLOGICAL: On AC, okay per GI, f/u Us duplex LE with no DVT     ENDOCRINE:  Follow up FS. Insulin protocol if needed.    MUSCULOSKELETAL: off loading     Palliative follow up  MICU monitoring

## 2025-06-24 NOTE — PROGRESS NOTE ADULT - ASSESSMENT
This is a 70 year old male with past medical history of  COPD (on 2L home O2), DM 2  HFrEF 30%, CAD,  ischemic cardiomyopathy s/p CRT-D, HTN, paroxysmal A-fib (status post ablation 1/14/25) on amiodarone and  Eliquis who presented to ED w c/o LESLIE and b/l  leg swelling.    IMPRESSION  #Chronic Leukocytosis- Now Worsened- Possible leukemoid reaction  #Acute on chronic hypoxic respiratory failure  #Heart failure with reduced EF, Acute on chronic exacerbation  #Large Bilateral pleural effusions/Pulmonary edema- Improving  #Can not rule out PNA vs Aspiration  #Anemia- Possible ischemic in nature resulting in loose stools  #CT abdomen noted for colitis- Suspect Ischemic colitis.   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  #Obesity BMI (kg/m2): 32.5, 28.7, 29.6, 30.1    Recently Enterovirus Positive Discharged on PO steroids Vantin+Doxy    Covid/Flu/RSV negative  MRSA nares positive  , Fungitell 108 (very mildly elevated)   Blood cultures NGTD  Sputum cultures Normal Respiratory magda  C.diff negative     RECOMMENDATIONS  -Unclear cause of fevers- Possible drug induced vs Ischemic Colitis.  -Monitor for fever curve.   -Cardiology follow up. Diuresis.  -Monitor off abx since 6/23/2025.   -Off loading to prevent pressure sores and preventive measures to avoid aspiration. TOV. GOC. Recurrent admissions expected.     If any questions, please send a message or call on [employer] Teams  Please continue to update ID with any pertinent new laboratory or radiographic findings.    Benigno Pond M.D  Infectious Diseases Attending/   [employer] at [facility]

## 2025-06-24 NOTE — PROGRESS NOTE ADULT - SUBJECTIVE AND OBJECTIVE BOX
24H events:    Patient is a 70y old Male who presents with a chief complaint of SOB (24 Jun 2025 12:12)    Primary diagnosis of CHF exacerbation    Today is hospital day 10d. This morning patient was seen and examined at bedside, resting comfortably in bed.    No acute or major events overnight.    Code Status:    Family communication:  Contact date:  Name of person contacted:  Relationship to patient:  Communication details:  What matters most:    PAST MEDICAL & SURGICAL HISTORY  CHF (congestive heart failure)    Diabetes    CAD (coronary artery disease)    Chronic obstructive pulmonary disease (COPD)    HTN (hypertension)    Stented coronary artery    Dyslipidemia    GERD (gastroesophageal reflux disease)    Afib    CVA (cerebral vascular accident)    H/O heart artery stent    Heart valve replaced  aortic    History of Nissen fundoplication      SOCIAL HISTORY:  Social History:      ALLERGIES:  Bactrim (Unknown)  sulfa drugs (Unknown)    MEDICATIONS:  STANDING MEDICATIONS  aMIOdarone    Tablet 200 milliGRAM(s) Oral daily  apixaban 5 milliGRAM(s) Enteral Tube every 12 hours  atorvastatin 40 milliGRAM(s) Oral at bedtime  bumetanide Injectable 1 milliGRAM(s) IV Push two times a day  chlorhexidine 0.12% Liquid 15 milliLiter(s) Oral Mucosa every 12 hours  chlorhexidine 2% Cloths 1 Application(s) Topical <User Schedule>  clopidogrel Tablet 75 milliGRAM(s) Enteral Tube daily  dextrose 5%. 1000 milliLiter(s) IV Continuous <Continuous>  dextrose 5%. 1000 milliLiter(s) IV Continuous <Continuous>  dextrose 50% Injectable 25 Gram(s) IV Push once  dextrose 50% Injectable 12.5 Gram(s) IV Push once  dextrose 50% Injectable 25 Gram(s) IV Push once  ezetimibe 10 milliGRAM(s) Oral daily  fentaNYL   Infusion. 0.5 MICROgram(s)/kG/Hr IV Continuous <Continuous>  glucagon  Injectable 1 milliGRAM(s) IntraMuscular once  insulin glargine Injectable (LANTUS) 34 Unit(s) SubCutaneous at bedtime  insulin lispro (ADMELOG) corrective regimen sliding scale   SubCutaneous every 6 hours  insulin lispro Injectable (ADMELOG) 8 Unit(s) SubCutaneous every 6 hours  metoprolol tartrate 25 milliGRAM(s) Oral two times a day  pantoprazole  Injectable 40 milliGRAM(s) IV Push two times a day  propofol Infusion 5 MICROgram(s)/kG/Min IV Continuous <Continuous>  senna 2 Tablet(s) Oral at bedtime  sodium zirconium cyclosilicate 10 Gram(s) Oral daily    PRN MEDICATIONS  acetaminophen     Tablet .. 650 milliGRAM(s) Oral every 6 hours PRN  albuterol/ipratropium for Nebulization 3 milliLiter(s) Nebulizer every 6 hours PRN  aluminum hydroxide/magnesium hydroxide/simethicone Suspension 30 milliLiter(s) Oral every 4 hours PRN  dextrose Oral Gel 15 Gram(s) Oral once PRN  guaiFENesin Oral Liquid (Sugar-Free) 200 milliGRAM(s) Oral every 6 hours PRN  melatonin 5 milliGRAM(s) Oral at bedtime PRN  midodrine. 5 milliGRAM(s) Oral three times a day PRN  ondansetron Injectable 4 milliGRAM(s) IV Push every 8 hours PRN    VITALS:   T(F): 99.5  HR: 72  BP: 100/58  RR: 24  SpO2: 98%    PHYSICAL EXAM:    General: No apparent distress  Cardiovascular: S1, S2  Gastrointestinal: Soft, Non-tender, Non-distended  Respiratory: vented  Neurologic: sedated  Dermatologic: Skin dry        (  ) Indwelling Mcmahon Catheter:   Date insterted:    Reason (  ) Critical illness     (  ) urinary retention    (  ) Accurate Ins/Outs Monitoring     (  ) CMO patient    (  ) Central Line:   Date inserted:  Location: (  ) Right IJ     (  ) Left IJ     (  ) Right Fem     (  ) Left Fem    (  ) SPC        (  ) pigtail       (  ) PEG tube       (  ) colostomy       (  ) jejunostomy  (  ) U-Dall    LABS:                        9.8    17.97 )-----------( 202 ( 24 Jun 2025 06:09 )             34.7     06-24    148[H]  |  113[H]  |  72[HH]  ----------------------------<  219[H]  5.0   |  27  |  1.4    Ca    8.0[L]      24 Jun 2025 06:09  Phos  2.9     06-23  Mg     2.5     06-24    TPro  5.0[L]  /  Alb  2.6[L]  /  TBili  0.2  /  DBili  x   /  AST  51[H]  /  ALT  36  /  AlkPhos  59  06-24    PT/INR - ( 23 Jun 2025 06:20 )   PT: 29.60 sec;   INR: 2.46 ratio         PTT - ( 23 Jun 2025 06:20 )  PTT:33.6 sec  Urinalysis Basic - ( 24 Jun 2025 06:09 )    Color: x / Appearance: x / SG: x / pH: x  Gluc: 219 mg/dL / Ketone: x  / Bili: x / Urobili: x   Blood: x / Protein: x / Nitrite: x   Leuk Esterase: x / RBC: x / WBC x   Sq Epi: x / Non Sq Epi: x / Bacteria: x      ABG - ( 24 Jun 2025 04:16 )  pH, Arterial: 7.34  pH, Blood: x     /  pCO2: 55    /  pO2: 136   / HCO3: 30    / Base Excess: 3.1   /  SaO2: 99.7                      RADIOLOGY:

## 2025-06-24 NOTE — PROGRESS NOTE ADULT - NS ATTEND AMEND GEN_ALL_CORE FT
Agree with plan noted above.    HFrEF (EF 30%, s/p CRT-D) - Improving but still overloaded. Continue diuresis. Wean vent as tolerated.   Will continue to follow.  Prognosis poor.

## 2025-06-24 NOTE — PROGRESS NOTE ADULT - ASSESSMENT
70 year old male with past medical history of  COPD (on 2L home O2), DM 2  HFrEF 30%, CAD,  ischemic cardiomyopathy s/p CRT-D, HTN, paroxysmal A-fib (status post ablation 1/14/25) on amiodarone and  Eliquis who presented to ED w c/o shortness of breath and b/l  leg swelling.     #Acute on chronic HFrEF decompensated heart failure EF 31%   #Anemia   #Acute pulmonary edema  #COPD, not in active exacerbation  #pulm HTN multifactorial secondary to cardiomyopathy reduced EF, likely LAW, baseline COPD  #chronic hypoxia from all the above  #B/L small pleural effusions due to pulmonary edema s/p intubation   - Unclear cause of fevers- Possible drug induced vs Ischemic Colitis  - Cardiology follow up for diuretics   - D/C Vanc/Cefepime (6/23). Received full course of abx.  - Off loading to prevent pressure sores and preventive measures to avoid aspiration.  - TO  - [facility]. pt previously opted for dnr, dni on prior MOLST but was intubated on this admission   - Palliative fu   - daily abgs and x-ray chest while intubated       DVT PPX: eliques  GI PPX: pantoprazole   DIET: tube feed    ACTIVITY:   CODE STATUS: on prior molst pt is dnr, dni   DISPOSITION:     PENDING:   diuretics, code status

## 2025-06-24 NOTE — PROGRESS NOTE ADULT - SUBJECTIVE AND OBJECTIVE BOX
DATE OF SERVICE: 06-24-25    Patient denies chest pain or shortness of breath.   Review of symptoms otherwise negative.    acetaminophen     Tablet .. 650 milliGRAM(s) Oral every 6 hours PRN  albuterol/ipratropium for Nebulization 3 milliLiter(s) Nebulizer every 6 hours PRN  aluminum hydroxide/magnesium hydroxide/simethicone Suspension 30 milliLiter(s) Oral every 4 hours PRN  aMIOdarone    Tablet 200 milliGRAM(s) Oral daily  apixaban 5 milliGRAM(s) Enteral Tube every 12 hours  atorvastatin 40 milliGRAM(s) Oral at bedtime  bumetanide Injectable 1 milliGRAM(s) IV Push two times a day  chlorhexidine 0.12% Liquid 15 milliLiter(s) Oral Mucosa every 12 hours  chlorhexidine 2% Cloths 1 Application(s) Topical <User Schedule>  clopidogrel Tablet 75 milliGRAM(s) Enteral Tube daily  dextrose 5%. 1000 milliLiter(s) IV Continuous <Continuous>  dextrose 5%. 1000 milliLiter(s) IV Continuous <Continuous>  dextrose 50% Injectable 25 Gram(s) IV Push once  dextrose 50% Injectable 12.5 Gram(s) IV Push once  dextrose 50% Injectable 25 Gram(s) IV Push once  dextrose Oral Gel 15 Gram(s) Oral once PRN  ezetimibe 10 milliGRAM(s) Oral daily  fentaNYL   Infusion. 0.5 MICROgram(s)/kG/Hr IV Continuous <Continuous>  glucagon  Injectable 1 milliGRAM(s) IntraMuscular once  guaiFENesin Oral Liquid (Sugar-Free) 200 milliGRAM(s) Oral every 6 hours PRN  insulin glargine Injectable (LANTUS) 34 Unit(s) SubCutaneous at bedtime  insulin lispro (ADMELOG) corrective regimen sliding scale   SubCutaneous every 6 hours  insulin lispro Injectable (ADMELOG) 8 Unit(s) SubCutaneous every 6 hours  melatonin 5 milliGRAM(s) Oral at bedtime PRN  metoprolol tartrate 25 milliGRAM(s) Oral two times a day  midodrine. 5 milliGRAM(s) Oral three times a day PRN  ondansetron Injectable 4 milliGRAM(s) IV Push every 8 hours PRN  pantoprazole  Injectable 40 milliGRAM(s) IV Push two times a day  propofol Infusion 5 MICROgram(s)/kG/Min IV Continuous <Continuous>  senna 2 Tablet(s) Oral at bedtime  sodium zirconium cyclosilicate 10 Gram(s) Oral daily                            9.8    17.97 )-----------( 202 ( 24 Jun 2025 06:09 )             34.7       Hemoglobin: 9.8 g/dL (06-24 @ 06:09)  Hemoglobin: 8.9 g/dL (06-23 @ 06:20)  Hemoglobin: 9.5 g/dL (06-22 @ 06:23)  Hemoglobin: 9.2 g/dL (06-21 @ 06:32)  Hemoglobin: 10.0 g/dL (06-20 @ 19:26)      06-24    148[H]  |  113[H]  |  72[HH]  ----------------------------<  219[H]  5.0   |  27  |  1.4    Ca    8.0[L]      24 Jun 2025 06:09  Phos  2.9     06-23  Mg     2.5     06-24    TPro  5.0[L]  /  Alb  2.6[L]  /  TBili  0.2  /  DBili  x   /  AST  51[H]  /  ALT  36  /  AlkPhos  59  06-24    Creatinine Trend: 1.4<--, 1.4<--, 1.3<--, 1.6<--, 1.5<--, 1.5<--    COAGS:           T(C): 37.5 (06-24-25 @ 11:00), Max: 38.8 (06-23-25 @ 23:00)  HR: 103 (06-24-25 @ 13:15) (70 - 104)  BP: 175/82 (06-24-25 @ 13:15) (95/50 - 177/80)  RR: 29 (06-24-25 @ 13:15) (21 - 72)  SpO2: 98% (06-24-25 @ 13:15) (98% - 100%)  Wt(kg): --    I&O's Summary    23 Jun 2025 07:01  -  24 Jun 2025 07:00  --------------------------------------------------------  IN: 2861.6 mL / OUT: 1520 mL / NET: 1341.6 mL    24 Jun 2025 07:01  -  24 Jun 2025 14:40  --------------------------------------------------------  IN: 483.4 mL / OUT: 700 mL / NET: -216.6 mL    PHYSICAL EXAM:  GENERAL:  71y/o Male NAD, resting comfortably.  HEAD:  Atraumatic, Normocephalic  EYES: EOMI, PERRLA, conjunctiva and sclera clear  NECK: Supple, No JVD, no cervical lymphadenopathy, non-tender  CHEST/LUNG: +wheeze B/L  HEART: Regular rate and rhythm; S1&S2  ABDOMEN: Soft, Nontender, Nondistended x 4 quadrants; Bowel sounds present  EXTREMITIES:   Peripheral Pulses Present, B/L LE pitting edema 1+  PSYCH: AAOx3, cooperative, appropriate  NEUROLOGY: WNL  SKIN: WNL    < from: TTE Echo Complete w/o Contrast w/ Doppler (06.16.25 @ 10:37) >  CONCLUSIONS:     1. Left ventricular systolic function is moderately decreased with an   ejection fraction of 31 % by Beck's method of disks.   2. Severe left ventricular hypertrophy.   3. There is severe (grade 3) left ventricular diastolic dysfunction.   4. Normal right ventricular cavity size, with normal wall thickness, and   normal right ventricular systolic function. Tricuspid annular plane   systolic excursion (TAPSE) is 1.5 cm (normal >=1.7 cm).   5. Device lead is visualized in the right heart.   6. Left atrium is moderately dilated.   7. A bioprosthetic valve replacement valve replacement is present in the   aortic position The prosthetic valve is well seated. Trace intravalvular   regurgitation.   8. Trace mitral regurgitation at a blood pressure of 119/75 mmHg.   9. Mild tricuspid regurgitation.  10. Estimated pulmonary artery systolic pressure is 44 mmHg, consistent   with mild pulmonary hypertension.  11. No pericardial effusion seen.      < end of copied text >    Assessment and Recommendations:  70 year old male with past medical history of  COPD (on 2L home O2), DM 2  HFrEF 30%, CAD,  ischemic cardiomyopathy s/p CRT-D, HTN, paroxysmal A-fib (status post ablation 1/14/25) on amiodarone and  Eliquis who presented to ED w c/o LESLIE and b/l  leg swelling.   Patient remained intubated in ICU. Continue on IV bumex for volume overload.     Impression:  #SOB/LESLIE: Multifactorial  #HFrEF (EF 30%, s/p CRT-D)  #CAD s/p PCI to RCA and LAD  #NSTEMI/ Hx of frequent NSVT   #Paroxysmal AFib on Eliquis      Recommendation:  - Patient remains intubated and sedated with fentanyl  - Hypervolemic on exam  - Continue IV Bumex/ Monitor Creatinine  - ID following for possible infection   - Strict intake and output, maintain negative balance for now  - Restart home GDMT for HFrEF, when stable/medically appropriate   - Pt needs ischemic eval, but has refused in the past   - Ongoing GOC discussion   ***All recommendations are preliminary until co-signed by an attending***            DATE OF SERVICE: 06-24-25    Patient denies chest pain or shortness of breath.   Review of symptoms otherwise negative.    acetaminophen     Tablet .. 650 milliGRAM(s) Oral every 6 hours PRN  albuterol/ipratropium for Nebulization 3 milliLiter(s) Nebulizer every 6 hours PRN  aluminum hydroxide/magnesium hydroxide/simethicone Suspension 30 milliLiter(s) Oral every 4 hours PRN  aMIOdarone    Tablet 200 milliGRAM(s) Oral daily  apixaban 5 milliGRAM(s) Enteral Tube every 12 hours  atorvastatin 40 milliGRAM(s) Oral at bedtime  bumetanide Injectable 1 milliGRAM(s) IV Push two times a day  chlorhexidine 0.12% Liquid 15 milliLiter(s) Oral Mucosa every 12 hours  chlorhexidine 2% Cloths 1 Application(s) Topical <User Schedule>  clopidogrel Tablet 75 milliGRAM(s) Enteral Tube daily  dextrose 5%. 1000 milliLiter(s) IV Continuous <Continuous>  dextrose 5%. 1000 milliLiter(s) IV Continuous <Continuous>  dextrose 50% Injectable 25 Gram(s) IV Push once  dextrose 50% Injectable 12.5 Gram(s) IV Push once  dextrose 50% Injectable 25 Gram(s) IV Push once  dextrose Oral Gel 15 Gram(s) Oral once PRN  ezetimibe 10 milliGRAM(s) Oral daily  fentaNYL   Infusion. 0.5 MICROgram(s)/kG/Hr IV Continuous <Continuous>  glucagon  Injectable 1 milliGRAM(s) IntraMuscular once  guaiFENesin Oral Liquid (Sugar-Free) 200 milliGRAM(s) Oral every 6 hours PRN  insulin glargine Injectable (LANTUS) 34 Unit(s) SubCutaneous at bedtime  insulin lispro (ADMELOG) corrective regimen sliding scale   SubCutaneous every 6 hours  insulin lispro Injectable (ADMELOG) 8 Unit(s) SubCutaneous every 6 hours  melatonin 5 milliGRAM(s) Oral at bedtime PRN  metoprolol tartrate 25 milliGRAM(s) Oral two times a day  midodrine. 5 milliGRAM(s) Oral three times a day PRN  ondansetron Injectable 4 milliGRAM(s) IV Push every 8 hours PRN  pantoprazole  Injectable 40 milliGRAM(s) IV Push two times a day  propofol Infusion 5 MICROgram(s)/kG/Min IV Continuous <Continuous>  senna 2 Tablet(s) Oral at bedtime  sodium zirconium cyclosilicate 10 Gram(s) Oral daily                            9.8    17.97 )-----------( 202 ( 24 Jun 2025 06:09 )             34.7       Hemoglobin: 9.8 g/dL (06-24 @ 06:09)  Hemoglobin: 8.9 g/dL (06-23 @ 06:20)  Hemoglobin: 9.5 g/dL (06-22 @ 06:23)  Hemoglobin: 9.2 g/dL (06-21 @ 06:32)  Hemoglobin: 10.0 g/dL (06-20 @ 19:26)      06-24    148[H]  |  113[H]  |  72[HH]  ----------------------------<  219[H]  5.0   |  27  |  1.4    Ca    8.0[L]      24 Jun 2025 06:09  Phos  2.9     06-23  Mg     2.5     06-24    TPro  5.0[L]  /  Alb  2.6[L]  /  TBili  0.2  /  DBili  x   /  AST  51[H]  /  ALT  36  /  AlkPhos  59  06-24    Creatinine Trend: 1.4<--, 1.4<--, 1.3<--, 1.6<--, 1.5<--, 1.5<--    COAGS:           T(C): 37.5 (06-24-25 @ 11:00), Max: 38.8 (06-23-25 @ 23:00)  HR: 103 (06-24-25 @ 13:15) (70 - 104)  BP: 175/82 (06-24-25 @ 13:15) (95/50 - 177/80)  RR: 29 (06-24-25 @ 13:15) (21 - 72)  SpO2: 98% (06-24-25 @ 13:15) (98% - 100%)  Wt(kg): --    I&O's Summary    23 Jun 2025 07:01  -  24 Jun 2025 07:00  --------------------------------------------------------  IN: 2861.6 mL / OUT: 1520 mL / NET: 1341.6 mL    24 Jun 2025 07:01  -  24 Jun 2025 14:40  --------------------------------------------------------  IN: 483.4 mL / OUT: 700 mL / NET: -216.6 mL    PHYSICAL EXAM:  GENERAL:  71y/o Male NAD, resting comfortably.  HEAD:  Atraumatic, Normocephalic  EYES: EOMI, PERRLA, conjunctiva and sclera clear  NECK: Supple, No JVD, no cervical lymphadenopathy, non-tender  CHEST/LUNG: +wheeze B/L  HEART: Regular rate and rhythm; S1&S2  ABDOMEN: Soft, Nontender, Nondistended x 4 quadrants; Bowel sounds present  EXTREMITIES:   Peripheral Pulses Present, B/L LE pitting edema 1+  PSYCH: AAOx3, cooperative, appropriate  NEUROLOGY: WNL  SKIN: WNL    < from: TTE Echo Complete w/o Contrast w/ Doppler (06.16.25 @ 10:37) >  CONCLUSIONS:     1. Left ventricular systolic function is moderately decreased with an   ejection fraction of 31 % by Beck's method of disks.   2. Severe left ventricular hypertrophy.   3. There is severe (grade 3) left ventricular diastolic dysfunction.   4. Normal right ventricular cavity size, with normal wall thickness, and   normal right ventricular systolic function. Tricuspid annular plane   systolic excursion (TAPSE) is 1.5 cm (normal >=1.7 cm).   5. Device lead is visualized in the right heart.   6. Left atrium is moderately dilated.   7. A bioprosthetic valve replacement valve replacement is present in the   aortic position The prosthetic valve is well seated. Trace intravalvular   regurgitation.   8. Trace mitral regurgitation at a blood pressure of 119/75 mmHg.   9. Mild tricuspid regurgitation.  10. Estimated pulmonary artery systolic pressure is 44 mmHg, consistent   with mild pulmonary hypertension.  11. No pericardial effusion seen.      < end of copied text >    Assessment and Recommendations:  70 year old male with past medical history of  COPD (on 2L home O2), DM 2  HFrEF 30%, CAD,  ischemic cardiomyopathy s/p CRT-D, HTN, paroxysmal A-fib (status post ablation 1/14/25) on amiodarone and  Eliquis who presented to ED w c/o LESLIE and b/l  leg swelling.   Patient remained intubated in ICU. Continue on IV bumex for volume overload.     Impression:  #SOB/LESLIE: Multifactorial  #HFrEF (EF 30%, s/p CRT-D)  #CAD s/p PCI to RCA and LAD  #NSTEMI/ Hx of frequent NSVT   #Paroxysmal AFib on Eliquis      Recommendation:  - Patient remains intubated and sedated with fentanyl  - Hypervolemic on exam  - Continue IV Bumex/ Monitor Creatinine  - ID following for possible infection   - Strict intake and output, maintain negative balance for now  - Restart home GDMT for HFrEF, when stable/medically appropriate   - Pt needs ischemic eval, but has refused in the past   - Ongoing GOC discussion

## 2025-06-24 NOTE — PROGRESS NOTE ADULT - SUBJECTIVE AND OBJECTIVE BOX
Patient seen and evaluated this am, sedated on ventilator with fentanyl and Precedex       T(F): 99.5 (06-24-25 @ 11:00), Max: 101.9 (06-23-25 @ 23:00)  HR: 72 (06-24-25 @ 08:37)  BP: 100/58 (06-24-25 @ 08:00)  RR: 20  SpO2: 98% (06-24-25 @ 08:37) (98% - 100%)    PHYSICAL EXAM:  GENERAL: NAD  HEAD:  Atraumatic, Normocephalic  EYES: EOMI, PERRLA, conjunctiva and sclera clear  NERVOUS SYSTEM:  no focal deficits   CHEST/LUNG:  bilateral rhonchi  HEART: Regular rate and rhythm; No murmurs, rubs, or gallops  ABDOMEN: Soft, Nontender, Nondistended; Bowel sounds present  EXTREMITIES:  2+ Peripheral Pulses, No clubbing, cyanosis, or edema    LABS  06-24    148[H]  |  113[H]  |  72[HH]  ----------------------------<  219[H]  5.0   |  27  |  1.4    Ca    8.0[L]      24 Jun 2025 06:09  Phos  2.9     06-23  Mg     2.5     06-24    TPro  5.0[L]  /  Alb  2.6[L]  /  TBili  0.2  /  DBili  x   /  AST  51[H]  /  ALT  36  /  AlkPhos  59  06-24                          9.8    17.97 )-----------( 202      ( 24 Jun 2025 06:09 )             34.7     PT/INR - ( 23 Jun 2025 06:20 )   PT: 29.60 sec;   INR: 2.46 ratio         PTT - ( 23 Jun 2025 06:20 )  PTT:33.6 sec    Mode: AC/ CMV (Assist Control/ Continuous Mandatory Ventilation)  RR (machine): 24  TV (machine): 400  FiO2: 40  PEEP: 8    Culture Results:   No growth at 5 days (06-16-25)  Culture Results:   Commensal magda consistent with body site (06-15-25)  Culture Results:   No growth at 5 days (06-14-25)  Culture Results:   No growth at 5 days (06-02-25)  Culture Results:   No growth at 5 days (06-02-25)    RADIOLOGY  < from: Xray Chest 1 View- PORTABLE-Routine (Xray Chest 1 View- PORTABLE-Routine in AM.) (06.24.25 @ 06:39) >  IMPRESSION:    Bilateral lung opacities and effusions which appear to have increased   from prior exam    < end of copied text >    MEDICATIONS  (STANDING):  aMIOdarone    Tablet 200 milliGRAM(s) Oral daily  apixaban 5 milliGRAM(s) Enteral Tube every 12 hours  atorvastatin 40 milliGRAM(s) Oral at bedtime  bumetanide Injectable 1 milliGRAM(s) IV Push two times a day  chlorhexidine 0.12% Liquid 15 milliLiter(s) Oral Mucosa every 12 hours  chlorhexidine 2% Cloths 1 Application(s) Topical <User Schedule>  clopidogrel Tablet 75 milliGRAM(s) Enteral Tube daily  ezetimibe 10 milliGRAM(s) Oral daily  fentaNYL   Infusion. 0.5 MICROgram(s)/kG/Hr (4.17 mL/Hr) IV Continuous <Continuous>  insulin glargine Injectable (LANTUS) 34 Unit(s) SubCutaneous at bedtime  insulin lispro (ADMELOG) corrective regimen sliding scale   SubCutaneous every 6 hours  insulin lispro Injectable (ADMELOG) 8 Unit(s) SubCutaneous every 6 hours  metoprolol tartrate 25 milliGRAM(s) Oral two times a day  pantoprazole  Injectable 40 milliGRAM(s) IV Push two times a day  propofol Infusion 5 MICROgram(s)/kG/Min (2.54 mL/Hr) IV Continuous <Continuous>  senna 2 Tablet(s) Oral at bedtime  sodium zirconium cyclosilicate 10 Gram(s) Oral daily    MEDICATIONS  (PRN):  acetaminophen     Tablet .. 650 milliGRAM(s) Oral every 6 hours PRN Temp greater or equal to 38C (100.4F), Mild Pain (1 - 3)  albuterol/ipratropium for Nebulization 3 milliLiter(s) Nebulizer every 6 hours PRN Shortness of Breath and/or Wheezing  aluminum hydroxide/magnesium hydroxide/simethicone Suspension 30 milliLiter(s) Oral every 4 hours PRN Dyspepsia  dextrose Oral Gel 15 Gram(s) Oral once PRN Blood Glucose LESS THAN 70 milliGRAM(s)/deciliter  guaiFENesin Oral Liquid (Sugar-Free) 200 milliGRAM(s) Oral every 6 hours PRN Cough  melatonin 5 milliGRAM(s) Oral at bedtime PRN Insomnia  midodrine. 5 milliGRAM(s) Oral three times a day PRN Hypotension  ondansetron Injectable 4 milliGRAM(s) IV Push every 8 hours PRN Nausea and/or Vomiting        Pending/Follow up items (tests, labs, procedures,consults):    Indication for continued inpatient management:    Goals of Care note:     Family contact:  Dispo (home, SNF, etc):    Prepare for DC order [x] - updated MARLENY is:   DC provider note prep:    -------------------------------Time spent-----------------------------------------------------------------------  Initial visit:             mins [ ] -- 55-74 mins [ ]  Subsequent visit: 50-79 mins[ ]    -- 35-49mins [ ]     --------------------------------# and complexity of problems---------------------------------------------  [ ] chronic illness with severe exacerbation , progression, treatment side effect  [ ] acute or chronic illness with threat to life or bodily funtion                         --------------------------------Complexity of data reviewed ----------------------------------------------  The Patients complexity of data reviewed  is Low [ ] Moderate [ ] High [ ] due to the following:   A:      Reviewed prior external records [ ]      Considering/ Ordered a unique test:  Labs [x ] Imaging [x ] Stress Test  [ ] Other: Specify [ ]      Reviewed each unique test result [x ]      Assessment requiring an independent historian [ ]  B:       Independent interpretation of:   X-Ray [x ] EKG [ ]  Other: Specify  [ ]  C:       Discussion of management of tests with clinician outside of my group [ ]    -------------------------------------Risk of Morbidity-------------------------------------------------------  The Patients risk of morbidity is Low [ ] Moderate [ ] High [ ] due to the following:   Mod:  Prescription Drug Management [ ]  Decision regarding elective or emergent: minor surgery [ ] major surgery [ ]  Decision regarding hospitalization or escalation of hospital-level care [ ]  SDOH: Hearing/Vision impaired [ ] Food insecurity [ ] Homelessness/unsafe condition [ ]   Financial strain [ ] un/underemployed [ ] Lack of Transport [ ]  High:  DNR or De-Escalation of care because of poor prognosis [ ]  Drug Therapy requiring intensive monitoring for toxicity [ ]  Parenteral controlled substance [ ]

## 2025-06-25 NOTE — PROGRESS NOTE ADULT - SUBJECTIVE AND OBJECTIVE BOX
Patient is a 70y old  Male who presents with a chief complaint of SOB (24 Jun 2025 14:39)        Over Night Events: No acute events noted, low grade fever         ROS:     All ROS are negative except HPI         PHYSICAL EXAM    ICU Vital Signs Last 24 Hrs  T(C): 37.4 (25 Jun 2025 07:01), Max: 37.5 (24 Jun 2025 11:00)  T(F): 99.3 (25 Jun 2025 07:01), Max: 99.5 (24 Jun 2025 11:00)  HR: 105 (25 Jun 2025 06:00) (72 - 115)  BP: 145/68 (25 Jun 2025 06:00) (100/58 - 177/80)  BP(mean): 98 (25 Jun 2025 06:00) (75 - 118)  ABP: --  ABP(mean): --  RR: 27 (25 Jun 2025 07:01) (23 - 42)  SpO2: 99% (25 Jun 2025 06:00) (97% - 100%)    O2 Parameters below as of 25 Jun 2025 06:00  Patient On (Oxygen Delivery Method): ventilator          CONSTITUTIONAL:  NAD    ENT:   Airway patent,   ET    EYES:   Pupils equal,   Round and reactive to light.    CARDIAC:   Normal rate,   Regular rhythm.      Vascular:  Normal systolic impulse  No Carotid bruits    RESPIRATORY:   No wheezing  Bilateral BS    GASTROINTESTINAL:  Abdomen soft,   Non-tender,   No guarding,   + BS    MUSCULOSKELETAL:   No clubbing, cyanosis    NEUROLOGICAL:   Intubated   No motor  deficits.    SKIN:   Skin normal color for race,   Warm and dry and intact.   No evidence of rash.        06-24-25 @ 07:01  -  06-25-25 @ 07:00  --------------------------------------------------------  IN:    Dexmedetomidine: 25 mL    Enteral Tube Flush: 150 mL    FentaNYL: 558.8 mL    Free Water: 1000 mL    Peptamen A.F.: 1314 mL  Total IN: 3047.8 mL    OUT:    Indwelling Catheter - Urethral (mL): 1615 mL    Propofol: 0 mL  Total OUT: 1615 mL    Total NET: 1432.8 mL      06-25-25 @ 07:01  -  06-25-25 @ 07:37  --------------------------------------------------------  IN:  Total IN: 0 mL    OUT:    Indwelling Catheter - Urethral (mL): 100 mL  Total OUT: 100 mL    Total NET: -100 mL          LABS:                            9.9    22.48 )-----------( 263      ( 25 Jun 2025 05:59 )             35.2                                               06-25    150[H]  |  113[H]  |  x   ----------------------------<  320[H]  5.2[H]   |  28  |  1.7[H]    Ca    8.6      25 Jun 2025 05:59  Mg     2.5     06-25    TPro  5.4[L]  /  Alb  2.9[L]  /  TBili  <0.2  /  DBili  x   /  AST  23  /  ALT  33  /  AlkPhos  63  06-25                                             Urinalysis Basic - ( 25 Jun 2025 05:59 )    Color: x / Appearance: x / SG: x / pH: x  Gluc: 320 mg/dL / Ketone: x  / Bili: x / Urobili: x   Blood: x / Protein: x / Nitrite: x   Leuk Esterase: x / RBC: x / WBC x   Sq Epi: x / Non Sq Epi: x / Bacteria: x                                                  LIVER FUNCTIONS - ( 25 Jun 2025 05:59 )  Alb: 2.9 g/dL / Pro: 5.4 g/dL / ALK PHOS: 63 U/L / ALT: 33 U/L / AST: 23 U/L / GGT: x                                                                                               Mode: AC/ CMV (Assist Control/ Continuous Mandatory Ventilation)  RR (machine): 26  TV (machine): 400  FiO2: 40  PEEP: 8  MAP: 12  PIP: 20                                      ABG - ( 25 Jun 2025 04:30 )  pH, Arterial: 7.28  pH, Blood: x     /  pCO2: 64    /  pO2: 131   / HCO3: 30    / Base Excess: 2.1   /  SaO2: 99.4                MEDICATIONS  (STANDING):  aMIOdarone    Tablet 200 milliGRAM(s) Oral daily  apixaban 5 milliGRAM(s) Enteral Tube every 12 hours  atorvastatin 40 milliGRAM(s) Oral at bedtime  bumetanide Injectable 1 milliGRAM(s) IV Push two times a day  chlorhexidine 0.12% Liquid 15 milliLiter(s) Oral Mucosa every 12 hours  chlorhexidine 2% Cloths 1 Application(s) Topical <User Schedule>  clopidogrel Tablet 75 milliGRAM(s) Enteral Tube daily  dextrose 5%. 1000 milliLiter(s) (100 mL/Hr) IV Continuous <Continuous>  dextrose 5%. 1000 milliLiter(s) (50 mL/Hr) IV Continuous <Continuous>  dextrose 50% Injectable 25 Gram(s) IV Push once  dextrose 50% Injectable 12.5 Gram(s) IV Push once  dextrose 50% Injectable 25 Gram(s) IV Push once  ezetimibe 10 milliGRAM(s) Oral daily  fentaNYL   Infusion. 0.5 MICROgram(s)/kG/Hr (4.17 mL/Hr) IV Continuous <Continuous>  glucagon  Injectable 1 milliGRAM(s) IntraMuscular once  insulin glargine Injectable (LANTUS) 34 Unit(s) SubCutaneous at bedtime  insulin lispro (ADMELOG) corrective regimen sliding scale   SubCutaneous every 6 hours  insulin lispro Injectable (ADMELOG) 8 Unit(s) SubCutaneous every 6 hours  metoprolol tartrate 25 milliGRAM(s) Oral two times a day  pantoprazole  Injectable 40 milliGRAM(s) IV Push two times a day  propofol Infusion 5 MICROgram(s)/kG/Min (2.54 mL/Hr) IV Continuous <Continuous>  senna 2 Tablet(s) Oral at bedtime  sodium zirconium cyclosilicate 10 Gram(s) Oral daily    MEDICATIONS  (PRN):  acetaminophen     Tablet .. 650 milliGRAM(s) Oral every 6 hours PRN Temp greater or equal to 38C (100.4F), Mild Pain (1 - 3)  albuterol/ipratropium for Nebulization 3 milliLiter(s) Nebulizer every 6 hours PRN Shortness of Breath and/or Wheezing  aluminum hydroxide/magnesium hydroxide/simethicone Suspension 30 milliLiter(s) Oral every 4 hours PRN Dyspepsia  dextrose Oral Gel 15 Gram(s) Oral once PRN Blood Glucose LESS THAN 70 milliGRAM(s)/deciliter  guaiFENesin Oral Liquid (Sugar-Free) 200 milliGRAM(s) Oral every 6 hours PRN Cough  melatonin 5 milliGRAM(s) Oral at bedtime PRN Insomnia  midodrine. 5 milliGRAM(s) Oral three times a day PRN Hypotension  ondansetron Injectable 4 milliGRAM(s) IV Push every 8 hours PRN Nausea and/or Vomiting      New X-rays reviewed:                                                                                  ECHO

## 2025-06-25 NOTE — PROGRESS NOTE ADULT - SUBJECTIVE AND OBJECTIVE BOX
24H events:    Patient is a 70y old Male who presents with a chief complaint of SOB (25 Jun 2025 10:19)    Primary diagnosis of CHF exacerbation    Today is hospital day 11d. This morning patient was seen and examined at bedside, resting comfortably in bed.    No acute or major events overnight.    Code Status:    Family communication:  Contact date:  Name of person contacted:  Relationship to patient:  Communication details:  What matters most:    PAST MEDICAL & SURGICAL HISTORY  CHF (congestive heart failure)    Diabetes    CAD (coronary artery disease)    Chronic obstructive pulmonary disease (COPD)    HTN (hypertension)    Stented coronary artery    Dyslipidemia    GERD (gastroesophageal reflux disease)    Afib    CVA (cerebral vascular accident)    H/O heart artery stent    Heart valve replaced  aortic    History of Nissen fundoplication      SOCIAL HISTORY:  Social History:      ALLERGIES:  Bactrim (Unknown)  sulfa drugs (Unknown)    MEDICATIONS:  STANDING MEDICATIONS  aMIOdarone    Tablet 200 milliGRAM(s) Oral daily  apixaban 5 milliGRAM(s) Enteral Tube every 12 hours  atorvastatin 40 milliGRAM(s) Oral at bedtime  chlorhexidine 0.12% Liquid 15 milliLiter(s) Oral Mucosa every 12 hours  chlorhexidine 2% Cloths 1 Application(s) Topical <User Schedule>  clopidogrel Tablet 75 milliGRAM(s) Enteral Tube daily  dextrose 5%. 1000 milliLiter(s) IV Continuous <Continuous>  dextrose 5%. 1000 milliLiter(s) IV Continuous <Continuous>  dextrose 5%. 1000 milliLiter(s) IV Continuous <Continuous>  dextrose 50% Injectable 25 Gram(s) IV Push once  dextrose 50% Injectable 12.5 Gram(s) IV Push once  dextrose 50% Injectable 25 Gram(s) IV Push once  ezetimibe 10 milliGRAM(s) Oral daily  fentaNYL   Infusion. 0.5 MICROgram(s)/kG/Hr IV Continuous <Continuous>  glucagon  Injectable 1 milliGRAM(s) IntraMuscular once  insulin glargine Injectable (LANTUS) 34 Unit(s) SubCutaneous at bedtime  insulin lispro (ADMELOG) corrective regimen sliding scale   SubCutaneous every 6 hours  insulin lispro Injectable (ADMELOG) 8 Unit(s) SubCutaneous every 6 hours  metoprolol tartrate 25 milliGRAM(s) Oral two times a day  pantoprazole  Injectable 40 milliGRAM(s) IV Push two times a day  propofol Infusion 5 MICROgram(s)/kG/Min IV Continuous <Continuous>  senna 2 Tablet(s) Oral at bedtime  sodium zirconium cyclosilicate 10 Gram(s) Oral daily    PRN MEDICATIONS  acetaminophen     Tablet .. 650 milliGRAM(s) Oral every 6 hours PRN  albuterol/ipratropium for Nebulization 3 milliLiter(s) Nebulizer every 6 hours PRN  aluminum hydroxide/magnesium hydroxide/simethicone Suspension 30 milliLiter(s) Oral every 4 hours PRN  dextrose Oral Gel 15 Gram(s) Oral once PRN  guaiFENesin Oral Liquid (Sugar-Free) 200 milliGRAM(s) Oral every 6 hours PRN  melatonin 5 milliGRAM(s) Oral at bedtime PRN  midodrine. 5 milliGRAM(s) Oral three times a day PRN  ondansetron Injectable 4 milliGRAM(s) IV Push every 8 hours PRN    VITALS:   T(F): 99.3  HR: 91  BP: 127/62  RR: 20  SpO2: 99%    PHYSICAL EXAM:    General: No apparent distress  Cardiovascular: S1, S2  Gastrointestinal: Soft, Non-tender, Non-distended  Respiratory: vented  Neurologic: sedated  Dermatologic: Skin dry        (  ) Indwelling Mcmahon Catheter:   Date insterted:    Reason (  ) Critical illness     (  ) urinary retention    (  ) Accurate Ins/Outs Monitoring     (  ) CMO patient    (  ) Central Line:   Date inserted:  Location: (  ) Right IJ     (  ) Left IJ     (  ) Right Fem     (  ) Left Fem    (  ) SPC        (  ) pigtail       (  ) PEG tube       (  ) colostomy       (  ) jejunostomy  (  ) U-Dall    LABS:                        9.9    22.48 )-----------( 263      ( 25 Jun 2025 05:59 )             35.2     06-25    150[H]  |  113[H]  |  83[HH]  ----------------------------<  320[H]  5.2[H]   |  28  |  1.7[H]    Ca    8.6      25 Jun 2025 05:59  Mg     2.5     06-25    TPro  5.4[L]  /  Alb  2.9[L]  /  TBili  <0.2  /  DBili  x   /  AST  23  /  ALT  33  /  AlkPhos  63  06-25      Urinalysis Basic - ( 25 Jun 2025 05:59 )    Color: x / Appearance: x / SG: x / pH: x  Gluc: 320 mg/dL / Ketone: x  / Bili: x / Urobili: x   Blood: x / Protein: x / Nitrite: x   Leuk Esterase: x / RBC: x / WBC x   Sq Epi: x / Non Sq Epi: x / Bacteria: x      ABG - ( 25 Jun 2025 06:17 )  pH, Arterial: 7.37  pH, Blood: x     /  pCO2: 53    /  pO2: 128   / HCO3: 31    / Base Excess: 4.5   /  SaO2: 100.0                     RADIOLOGY:

## 2025-06-25 NOTE — PROGRESS NOTE ADULT - ASSESSMENT
IMPRESSION:    Acute on chronic HFrEF decompensated heart failure EF 31%   Anemia, stable    Acute pulmonary edema  COPD, not in active exacerbation  pulm HTN multifactorial secondary to cardiomyopathy reduced EF, likely LAW, baseline COPD  chronic hypoxia from all the above  Small pleural effusions due to pulmonary edema      PLAN:    CNS: Daily SAT, Dc PRECEDEX and watch fever curve     HEENT: Oral care    PULMONARY:  HOB @ 45 degrees. CXR with worse BL effusions, Resume diuresis. Duonebs q 6 hrs PRN, Vent Changes: Increase RR, monitor for vent synchrony, f/u Peak and Plateau pressures, repeat ABG in 1 hour        CARDIOVASCULAR: TTE noted , volume contraction as tolerated, Cardiology follow up, bumex 1 mg BID     GI: GI prophylaxis. Feeding. C. Diff GI PCR negative, trend Hb stable, f/u CT abdomen w/ no active bleed     RENAL:  Follow up lytes. Correct as needed, Senna and Miralax for BM    INFECTIOUS DISEASE: Follow up cultures, MRSA +, finished course of vancomycin and cefepime, f/u ID reccs, trend WBC, if still febrile will need repeat CT scan abdomen, f/u lactate      HEMATOLOGICAL: On AC, okay per GI, f/u Us duplex LE with no DVT     ENDOCRINE:  Follow up FS. Insulin protocol if needed.    MUSCULOSKELETAL: off loading     Palliative follow up  MICU monitoring  IMPRESSION:    Acute on chronic HFrEF decompensated heart failure EF 31%   Anemia, stable    Acute pulmonary edema  COPD, not in active exacerbation  pulm HTN multifactorial secondary to cardiomyopathy reduced EF, likely LAW, baseline COPD  chronic hypoxia from all the above  Small pleural effusions due to pulmonary edema      PLAN:    CNS: Daily SAT,off  PRECEDEX and watch fever curve     HEENT: Oral care    PULMONARY:  HOB @ 45 degrees. CXR with worse BL effusions, Resume diuresis. Duonebs q 6 hrs PRN, Vent Changes: Increase RR, monitor for vent synchrony, f/u Peak and Plateau pressures, repeat ABG in 1 hour        CARDIOVASCULAR: TTE noted , volume contraction as tolerated, Cardiology follow up, hold bumex he recieved 1mg in am   start D5w 50cc/hr     GI: GI prophylaxis. Feeding. C. Diff GI PCR negative, trend Hb stable, f/u CT abdomen w/ no active bleed   free water 250cc Q4 hrs     RENAL:  Follow up lytes. Correct as needed, Senna and Miralax for BM    INFECTIOUS DISEASE: Follow up cultures, MRSA +, finished course of vancomycin and cefepime, f/u ID reccs, trend WBC, if still febrile will need repeat CT scan abdomen, f/u lactate      HEMATOLOGICAL: On AC, okay per GI, f/u Us duplex LE with no DVT     ENDOCRINE:  Follow up FS. Insulin protocol if needed.    MUSCULOSKELETAL: off loading     Palliative follow up  MICU monitoring

## 2025-06-25 NOTE — PROGRESS NOTE ADULT - NS ATTEND AMEND GEN_ALL_CORE FT
Patient seen and examined.  Agree with above.   Pt. appears euvolemic on exam with rising cr  Would hold diuretics and monitor Na and Cr  Lifelong ac (for afib) and sapt (for history of PCI) if no contraindications     Armando Herron MD

## 2025-06-25 NOTE — PROGRESS NOTE ADULT - ASSESSMENT
70 year old male with past medical history of  COPD (on 2L home O2), DM 2  HFrEF 30%, CAD,  ischemic cardiomyopathy s/p CRT-D, HTN, paroxysmal A-fib (status post ablation 1/14/25) on amiodarone and  Eliquis who presented to ED w c/o shortness of breath and b/l  leg swelling.     #Acute on chronic HFrEF decompensated heart failure EF 31%   #Anemia   #Acute pulmonary edema  #COPD, not in active exacerbation  #pulm HTN multifactorial secondary to cardiomyopathy reduced EF, likely LAW, baseline COPD  #chronic hypoxia from all the above  #B/L small pleural effusions due to pulmonary edema s/p intubation   - Unclear cause of fevers- Possible drug induced vs Ischemic Colitis  - Cardiology follow up for diuretics   - D/C Vanc/Cefepime (6/23). Received full course of abx.  - Off loading to prevent pressure sores and preventive measures to avoid aspiration.  - TO  - [facility]. pt previously opted for dnr, dni on prior MOLST but was intubated on this admission   - Palliative fu   - daily abgs and x-ray chest while intubated       DVT PPX: eliques  GI PPX: pantoprazole   DIET: tube feed    ACTIVITY:   CODE STATUS: on prior molst pt is dnr, dni   DISPOSITION:     PENDING:   diuretics, code status

## 2025-06-25 NOTE — PROGRESS NOTE ADULT - SUBJECTIVE AND OBJECTIVE BOX
Patient seen and evaluated this am, lightly sedated on ventilator      T(F): 99.3 (06-25-25 @ 07:01), Max: 99.5 (06-24-25 @ 11:00)  HR: 91 (06-25-25 @ 08:00)  BP: 127/62 (06-25-25 @ 08:00)  RR: 20 (06-25-25 @ 08:00)  SpO2: 99% (06-25-25 @ 08:00) (97% - 99%)    PHYSICAL EXAM:  GENERAL: NAD  HEAD:  Atraumatic, Normocephalic  EYES: EOMI, PERRLA, conjunctiva and sclera clear  NERVOUS SYSTEM:  no focal deficits   CHEST/LUNG:  bilateral rhonchi  HEART: Regular rate and rhythm; No murmurs, rubs, or gallops  ABDOMEN: Soft, Nontender, Nondistended; Bowel sounds present  EXTREMITIES:  2+ Peripheral Pulses, No clubbing, cyanosis, or edema    LABS  06-25    150[H]  |  113[H]  |  83[HH]  ----------------------------<  320[H]  5.2[H]   |  28  |  1.7[H]    Ca    8.6      25 Jun 2025 05:59  Mg     2.5     06-25    TPro  5.4[L]  /  Alb  2.9[L]  /  TBili  <0.2  /  DBili  x   /  AST  23  /  ALT  33  /  AlkPhos  63  06-25                          9.9    22.48 )-----------( 263      ( 25 Jun 2025 05:59 )             35.2       Mode: AC/ CMV (Assist Control/ Continuous Mandatory Ventilation)  RR (machine): 26  TV (machine): 400  FiO2: 40  PEEP: 8      < from: TTE Echo Complete w/o Contrast w/ Doppler (06.16.25 @ 10:37) >    CONCLUSIONS:     1. Left ventricular systolic function is moderately decreased with an   ejection fraction of 31 % by Beck's method of disks.   2. Severe left ventricular hypertrophy.   3. There is severe (grade 3) left ventricular diastolic dysfunction.   4. Normal right ventricular cavity size, with normal wall thickness, and   normal right ventricular systolic function. Tricuspid annular plane   systolic excursion (TAPSE) is 1.5 cm (normal >=1.7 cm).   5. Device lead is visualized in the right heart.   6. Left atrium is moderately dilated.   7. A bioprosthetic valve replacement valve replacement is present in the   aortic position The prosthetic valve is well seated. Trace intravalvular   regurgitation.   8. Trace mitral regurgitation at a blood pressure of 119/75 mmHg.   9. Mild tricuspid regurgitation.  10. Estimated pulmonary artery systolic pressure is 44 mmHg, consistent   with mild pulmonary hypertension.  11. No pericardial effusion seen.      < end of copied text >      Culture Results:   No growth at 5 days (06-16-25)  Culture Results:   Commensal magda consistent with body site (06-15-25)  Culture Results:   No growth at 5 days (06-14-25)  Culture Results:   No growth at 5 days (06-02-25)  Culture Results:   No growth at 5 days (06-02-25)    RADIOLOGY  < from: Xray Chest 1 View- PORTABLE-Routine (Xray Chest 1 View- PORTABLE-Routine in AM.) (06.25.25 @ 07:45) >    IMPRESSION: Low lung volume.    Bibasilar opacities, unchanged.    < end of copied text >    MEDICATIONS  (STANDING):  aMIOdarone    Tablet 200 milliGRAM(s) Oral daily  apixaban 5 milliGRAM(s) Enteral Tube every 12 hours  atorvastatin 40 milliGRAM(s) Oral at bedtime  chlorhexidine 0.12% Liquid 15 milliLiter(s) Oral Mucosa every 12 hours  chlorhexidine 2% Cloths 1 Application(s) Topical <User Schedule>  clopidogrel Tablet 75 milliGRAM(s) Enteral Tube daily  ezetimibe 10 milliGRAM(s) Oral daily  fentaNYL   Infusion. 0.5 MICROgram(s)/kG/Hr (4.17 mL/Hr) IV Continuous <Continuous>  insulin glargine Injectable (LANTUS) 34 Unit(s) SubCutaneous at bedtime  insulin lispro (ADMELOG) corrective regimen sliding scale   SubCutaneous every 6 hours  insulin lispro Injectable (ADMELOG) 8 Unit(s) SubCutaneous every 6 hours  metoprolol tartrate 25 milliGRAM(s) Oral two times a day  pantoprazole  Injectable 40 milliGRAM(s) IV Push two times a day  propofol Infusion 5 MICROgram(s)/kG/Min (2.54 mL/Hr) IV Continuous <Continuous>  senna 2 Tablet(s) Oral at bedtime  sodium zirconium cyclosilicate 10 Gram(s) Oral daily    MEDICATIONS  (PRN):  acetaminophen     Tablet .. 650 milliGRAM(s) Oral every 6 hours PRN Temp greater or equal to 38C (100.4F), Mild Pain (1 - 3)  albuterol/ipratropium for Nebulization 3 milliLiter(s) Nebulizer every 6 hours PRN Shortness of Breath and/or Wheezing  aluminum hydroxide/magnesium hydroxide/simethicone Suspension 30 milliLiter(s) Oral every 4 hours PRN Dyspepsia  dextrose Oral Gel 15 Gram(s) Oral once PRN Blood Glucose LESS THAN 70 milliGRAM(s)/deciliter  guaiFENesin Oral Liquid (Sugar-Free) 200 milliGRAM(s) Oral every 6 hours PRN Cough  melatonin 5 milliGRAM(s) Oral at bedtime PRN Insomnia  midodrine. 5 milliGRAM(s) Oral three times a day PRN Hypotension  ondansetron Injectable 4 milliGRAM(s) IV Push every 8 hours PRN Nausea and/or Vomiting        Pending/Follow up items (tests, labs, procedures,consults):   - free water and follow sodium levels   - wean ventilator as tolerated  Indication for continued inpatient management:    Goals of Care note:     Family contact:  Dispo (home, SNF, etc):    Prepare for DC order [x] - updated MARLENY is:   DC provider note prep:    -------------------------------Time spent-----------------------------------------------------------------------  Initial visit:             mins [ ] -- 55-74 mins [ ]  Subsequent visit: 50-79 mins[x ]    -- 35-49mins [ ]     --------------------------------# and complexity of problems---------------------------------------------  [ ] chronic illness with severe exacerbation , progression, treatment side effect  [ ] acute or chronic illness with threat to life or bodily funtion                         --------------------------------Complexity of data reviewed ----------------------------------------------  The Patients complexity of data reviewed  is Low [ ] Moderate [ ] High [ ] due to the following:   A:      Reviewed prior external records [ ]      Considering/ Ordered a unique test:  Labs [x ] Imaging [x ] Stress Test  [ ] Other: Specify [ ]      Reviewed each unique test result [x ]      Assessment requiring an independent historian [ ]  B:       Independent interpretation of:   X-Ray [x ] EKG [ ]  Other: Specify  [ ]  C:       Discussion of management of tests with clinician outside of my group [ ]    -------------------------------------Risk of Morbidity-------------------------------------------------------  The Patients risk of morbidity is Low [ ] Moderate [ ] High [ ] due to the following:   Mod:  Prescription Drug Management [ ]  Decision regarding elective or emergent: minor surgery [ ] major surgery [ ]  Decision regarding hospitalization or escalation of hospital-level care [ ]  SDOH: Hearing/Vision impaired [ ] Food insecurity [ ] Homelessness/unsafe condition [ ]   Financial strain [ ] un/underemployed [ ] Lack of Transport [ ]  High:  DNR or De-Escalation of care because of poor prognosis [ ]  Drug Therapy requiring intensive monitoring for toxicity [ ]  Parenteral controlled substance [ ]

## 2025-06-25 NOTE — PROGRESS NOTE ADULT - SUBJECTIVE AND OBJECTIVE BOX
DATE OF SERVICE: 06-24-25    Patient remained intubated and sedated.   creatinine up trending and hypernatremic.         acetaminophen     Tablet .. 650 milliGRAM(s) Oral every 6 hours PRN  albuterol/ipratropium for Nebulization 3 milliLiter(s) Nebulizer every 6 hours PRN  aluminum hydroxide/magnesium hydroxide/simethicone Suspension 30 milliLiter(s) Oral every 4 hours PRN  aMIOdarone    Tablet 200 milliGRAM(s) Oral daily  apixaban 5 milliGRAM(s) Enteral Tube every 12 hours  atorvastatin 40 milliGRAM(s) Oral at bedtime  bumetanide Injectable 1 milliGRAM(s) IV Push two times a day  chlorhexidine 0.12% Liquid 15 milliLiter(s) Oral Mucosa every 12 hours  chlorhexidine 2% Cloths 1 Application(s) Topical <User Schedule>  clopidogrel Tablet 75 milliGRAM(s) Enteral Tube daily  dextrose 5%. 1000 milliLiter(s) IV Continuous <Continuous>  dextrose 5%. 1000 milliLiter(s) IV Continuous <Continuous>  dextrose 50% Injectable 25 Gram(s) IV Push once  dextrose 50% Injectable 12.5 Gram(s) IV Push once  dextrose 50% Injectable 25 Gram(s) IV Push once  dextrose Oral Gel 15 Gram(s) Oral once PRN  ezetimibe 10 milliGRAM(s) Oral daily  fentaNYL   Infusion. 0.5 MICROgram(s)/kG/Hr IV Continuous <Continuous>  glucagon  Injectable 1 milliGRAM(s) IntraMuscular once  guaiFENesin Oral Liquid (Sugar-Free) 200 milliGRAM(s) Oral every 6 hours PRN  insulin glargine Injectable (LANTUS) 34 Unit(s) SubCutaneous at bedtime  insulin lispro (ADMELOG) corrective regimen sliding scale   SubCutaneous every 6 hours  insulin lispro Injectable (ADMELOG) 8 Unit(s) SubCutaneous every 6 hours  melatonin 5 milliGRAM(s) Oral at bedtime PRN  metoprolol tartrate 25 milliGRAM(s) Oral two times a day  midodrine. 5 milliGRAM(s) Oral three times a day PRN  ondansetron Injectable 4 milliGRAM(s) IV Push every 8 hours PRN  pantoprazole  Injectable 40 milliGRAM(s) IV Push two times a day  propofol Infusion 5 MICROgram(s)/kG/Min IV Continuous <Continuous>  senna 2 Tablet(s) Oral at bedtime  sodium zirconium cyclosilicate 10 Gram(s) Oral daily                            9.8    17.97 )-----------( 202      ( 24 Jun 2025 06:09 )             34.7       Hemoglobin: 9.8 g/dL (06-24 @ 06:09)  Hemoglobin: 8.9 g/dL (06-23 @ 06:20)  Hemoglobin: 9.5 g/dL (06-22 @ 06:23)  Hemoglobin: 9.2 g/dL (06-21 @ 06:32)  Hemoglobin: 10.0 g/dL (06-20 @ 19:26)    ICU Vital Signs Last 24 Hrs  T(C): 37.3 (25 Jun 2025 11:00), Max: 37.4 (25 Jun 2025 07:01)  T(F): 99.1 (25 Jun 2025 11:00), Max: 99.3 (25 Jun 2025 07:01)  HR: 101 (25 Jun 2025 13:00) (80 - 114)  BP: 136/63 (25 Jun 2025 13:00) (108/55 - 167/75)  BP(mean): 90 (25 Jun 2025 13:00) (77 - 108)  ABP: --  ABP(mean): --  RR: 20 (25 Jun 2025 13:00) (20 - 28)  SpO2: 99% (25 Jun 2025 13:00) (97% - 99%)    O2 Parameters below as of 25 Jun 2025 08:00  Patient On (Oxygen Delivery Method): ventilator    I&O's Summary    24 Jun 2025 07:01  -  25 Jun 2025 07:00  --------------------------------------------------------  IN: 3047.8 mL / OUT: 1615 mL / NET: 1432.8 mL    25 Jun 2025 07:01  -  25 Jun 2025 15:05  --------------------------------------------------------  IN: 93.3 mL / OUT: 850 mL / NET: -756.7 mL        PHYSICAL EXAM:  GENERAL:  intubated     CHEST/LUNG: +wheeze B/L  HEART: Regular rate and rhythm; S1&S2  ABDOMEN: Soft, Nontender, Nondistended x 4 quadrants; Bowel sounds present  EXTREMITIES:   Peripheral Pulses Present, B/L LE trace  PSYCH: AAOx3, cooperative, appropriate  NEUROLOGY: WNL  SKIN: WNL    < from: TTE Echo Complete w/o Contrast w/ Doppler (06.16.25 @ 10:37) >  CONCLUSIONS:     1. Left ventricular systolic function is moderately decreased with an   ejection fraction of 31 % by Beck's method of disks.   2. Severe left ventricular hypertrophy.   3. There is severe (grade 3) left ventricular diastolic dysfunction.   4. Normal right ventricular cavity size, with normal wall thickness, and   normal right ventricular systolic function. Tricuspid annular plane   systolic excursion (TAPSE) is 1.5 cm (normal >=1.7 cm).   5. Device lead is visualized in the right heart.   6. Left atrium is moderately dilated.   7. A bioprosthetic valve replacement valve replacement is present in the   aortic position The prosthetic valve is well seated. Trace intravalvular   regurgitation.   8. Trace mitral regurgitation at a blood pressure of 119/75 mmHg.   9. Mild tricuspid regurgitation.  10. Estimated pulmonary artery systolic pressure is 44 mmHg, consistent   with mild pulmonary hypertension.  11. No pericardial effusion seen.      < end of copied text >    Assessment and Recommendations:  70 year old male with past medical history of  COPD (on 2L home O2), DM 2  HFrEF 30%, CAD,  ischemic cardiomyopathy s/p CRT-D, HTN, paroxysmal A-fib (status post ablation 1/14/25) on amiodarone and  Eliquis who presented to ED w c/o LESLIE and b/l  leg swelling.   Patient remained intubated in ICU. Continue on IV bumex for volume overload.     Impression:  #SOB/LESLIE: Multifactorial  #HFrEF (EF 30%, s/p CRT-D)  #CAD s/p PCI to RCA and LAD  #NSTEMI/ Hx of frequent NSVT   #Paroxysmal AFib on Eliquis      Recommendation:  - Patient remains intubated and sedated with fentanyl. warm and perfusing.   - bumex 1mgIVP Resumed yesterday. However, now with uptrending creatinine 1.4-->1.7 and sodium uptrending -->  would hold off on diuretic today and treat elevated sodium level   - ID following for possible infection   - Strict intake and output, maintain negative balance for now  - Restart home GDMT for HFrEF, when stable/medically appropriate   - Pt needs ischemic eval, but has refused in the past   - Ongoing GOC discussion

## 2025-06-25 NOTE — PROGRESS NOTE ADULT - ASSESSMENT
70 year old male with media l history of CKD3,  COPD on 2L home O2, diabetes, chronic  HFrEF  s/p CRT-D, AVR, hypertension, paroxysmal A-fib (status post ablation 1/14/25)  CAD admitted  for dyspnea on exertion and leg swelling.     acute respiratory failure / sepsis  / aspiration pneumonia / fluid overload / hypernatremia     - completed antibiotic course - now afebrile    - start free water and follow sodium levels   - hold  Bumex today    - insulin sq hospital protocol   - SAT and if tolerates -> SBT   - 50 minutes total spent today on patient care

## 2025-06-26 NOTE — PROGRESS NOTE ADULT - ASSESSMENT
IMPRESSION:    Acute on chronic HFrEF decompensated heart failure EF 31%   Anemia, stable    Acute pulmonary edema  COPD, not in active exacerbation  pulm HTN multifactorial secondary to cardiomyopathy reduced EF, likely LAW, baseline COPD  chronic hypoxia from all the above  Small pleural effusions due to pulmonary edema      PLAN:    CNS: Daily SAT, off  PRECEDEX and watch fever curve, f/u TG level on propofol      HEENT: Oral care    PULMONARY:  HOB @ 45 degrees. CXR with BL effusions, Duonebs q 6 hrs, Vent Changes: monitor for vent synchrony, f/u Peak and Plateau pressures, allow permissive hypercapnia        CARDIOVASCULAR: TTE noted , volume contraction as tolerated, Cardiology follow up, hold Bumex start D5w 75cc/hr     GI: GI prophylaxis. Feeding. C. Diff GI PCR negative, trend Hb stable, f/u CT abdomen w/ no active bleed, free water 250cc Q4 hrs flushes      RENAL:  Follow up lytes. Correct as needed, Senna and Miralax for BM, f/u nephro eval     INFECTIOUS DISEASE: Follow up cultures, MRSA +, finished course of vancomycin and cefepime, f/u ID reccs, trend WBC, if still febrile will need repeat CT scan abdomen, f/u lactate      HEMATOLOGICAL: On AC, okay per GI, f/u Us duplex LE with no DVT     ENDOCRINE:  Follow up FS. Insulin protocol if needed.    MUSCULOSKELETAL: off loading     Palliative follow up  Poor prognosis   MICU monitoring    IMPRESSION:    Acute on chronic HFrEF decompensated heart failure EF 31%   Anemia, stable    Acute pulmonary edema  COPD, not in active exacerbation  pulm HTN multifactorial secondary to cardiomyopathy reduced EF, likely LAW, baseline COPD  chronic hypoxia from all the above  Small pleural effusions due to pulmonary edema      PLAN:    CNS: Daily SAT, off  PRECEDEX and watch fever curve, f/u TG level on propofol      HEENT: Oral care    PULMONARY:  HOB @ 45 degrees. CXR with BL effusions, Duonebs q 6 hrs, Vent Changes: monitor for vent synchrony, f/u Peak and Plateau pressures, allow permissive hypercapnia      still not ready for extubate on high minute ventilation   CARDIOVASCULAR: TTE noted , volume contraction as tolerated, Cardiology follow up, hold Bumex   continue D5w 75cc/hr     GI: GI prophylaxis. Feeding. C. Diff GI PCR negative, trend Hb stable, f/u CT abdomen w/ no active bleed, free water 250cc Q4 hrs flushes      RENAL:  Follow up lytes. Correct as needed, Senna and Miralax for BM, f/u nephro eval     INFECTIOUS DISEASE: Follow up cultures, MRSA +, finished course of vancomycin and cefepime, f/u ID reccs, trend WBC, if still febrile will need repeat CT scan abdomen, f/u lactate      HEMATOLOGICAL: On AC, okay per GI, f/u Us duplex LE with no DVT     ENDOCRINE:  Follow up FS. Insulin protocol if needed.    MUSCULOSKELETAL: off loading     Palliative follow up  Poor prognosis   MICU monitoring

## 2025-06-26 NOTE — PROGRESS NOTE ADULT - SUBJECTIVE AND OBJECTIVE BOX
MONIQUE LYONS  70y, Male    LOS  12d    INTERVAL EVENTS/HPI  - No acute events overnight  - WBC Count: 28.66 (06-26-25 @ 05:54)  WBC Count: 22.48 (06-25-25 @ 05:59)  - Creatinine: 1.7 (06-26-25 @ 05:54)  Creatinine: 1.7 (06-25-25 @ 19:43)    REVIEW OF SYSTEMS: cannot obtain further history from the patient secondary to altered mental status or sedation    ANTIMICROBIALS:       OTHER MEDS: MEDICATIONS  (STANDING):  acetaminophen     Tablet .. 650 every 6 hours PRN  albuterol/ipratropium for Nebulization 3 every 6 hours  aluminum hydroxide/magnesium hydroxide/simethicone Suspension 30 every 4 hours PRN  aMIOdarone    Tablet 200 daily  apixaban 5 every 12 hours  atorvastatin 40 at bedtime  clopidogrel Tablet 75 daily  dextrose 50% Injectable 25 once  dextrose 50% Injectable 12.5 once  dextrose 50% Injectable 25 once  dextrose Oral Gel 15 once PRN  ezetimibe 10 daily  fentaNYL   Infusion. 0.501 <Continuous>  glucagon  Injectable 1 once  guaiFENesin Oral Liquid (Sugar-Free) 200 every 6 hours PRN  insulin glargine Injectable (LANTUS) 34 at bedtime  insulin lispro (ADMELOG) corrective regimen sliding scale  every 6 hours  insulin lispro Injectable (ADMELOG) 8 every 6 hours  melatonin 5 at bedtime PRN  metoprolol tartrate 25 two times a day  midodrine. 5 three times a day PRN  ondansetron Injectable 4 every 8 hours PRN  pantoprazole  Injectable 40 two times a day  propofol Infusion 5 <Continuous>  senna 2 at bedtime      Vital Signs Last 24 Hrs  T(F): 95.5 (06-26-25 @ 11:00), Max: 101.9 (06-23-25 @ 23:00)    Vital Signs Last 24 Hrs  HR: 101 (06-26-25 @ 14:01) (80 - 107)  BP: 162/74 (06-26-25 @ 13:01) (116/59 - 163/74)  RR: 25 (06-26-25 @ 14:01)  SpO2: 100% (06-26-25 @ 14:01) (98% - 100%)  Wt(kg): --    EXAM:  GENERAL: On MV  HEAD: No head lesions  NECK: Supple  CHEST/LUNG: Shallow breath sounds   HEART: S1 S2  ABDOMEN: Soft, nontender +Mcmahon  EXTREMITIES: No clubbing  MSK: +1 edema   SKIN: No rashes or lesions, no superficial thrombophlebitis    Labs:                        8.8    28.66 )-----------( 275      ( 26 Jun 2025 05:54 )             30.9     06-26    151[H]  |  113[H]  |  88[HH]  ----------------------------<  175[H]  4.6   |  29  |  1.7[H]    Ca    8.6      26 Jun 2025 05:54  Mg     2.6     06-26    TPro  5.2[L]  /  Alb  2.9[L]  /  TBili  <0.2  /  DBili  x   /  AST  23  /  ALT  27  /  AlkPhos  55  06-26      WBC Trend:  WBC Count: 28.66 (06-26-25 @ 05:54)  WBC Count: 22.48 (06-25-25 @ 05:59)  WBC Count: 17.97 (06-24-25 @ 06:09)  WBC Count: 15.55 (06-23-25 @ 06:20)      Creatine Trend:  Creatinine: 1.7 (06-26)  Creatinine: 1.7 (06-25)  Creatinine: 1.7 (06-25)  Creatinine: 1.4 (06-24)      Liver Biochemical Testing Trend:  Alanine Aminotransferase (ALT/SGPT): 27 (06-26)  Alanine Aminotransferase (ALT/SGPT): 33 (06-25)  Alanine Aminotransferase (ALT/SGPT): 36 (06-24)  Alanine Aminotransferase (ALT/SGPT): 17 (06-23)  Alanine Aminotransferase (ALT/SGPT): 9 (06-22)  Aspartate Aminotransferase (AST/SGOT): 23 (06-26-25 @ 05:54)  Aspartate Aminotransferase (AST/SGOT): 23 (06-25-25 @ 05:59)  Aspartate Aminotransferase (AST/SGOT): 51 (06-24-25 @ 06:09)  Aspartate Aminotransferase (AST/SGOT): 25 (06-23-25 @ 06:20)  Aspartate Aminotransferase (AST/SGOT): 13 (06-22-25 @ 06:23)  Bilirubin Total: <0.2 (06-26)  Bilirubin Total: <0.2 (06-25)  Bilirubin Total: 0.2 (06-24)  Bilirubin Total: 0.2 (06-23)  Bilirubin Total: 0.3 (06-22)      Trend LDH  06-15-25 @ 06:45  375[H]      Urinalysis Basic - ( 26 Jun 2025 05:54 )    Color: x / Appearance: x / SG: x / pH: x  Gluc: 175 mg/dL / Ketone: x  / Bili: x / Urobili: x   Blood: x / Protein: x / Nitrite: x   Leuk Esterase: x / RBC: x / WBC x   Sq Epi: x / Non Sq Epi: x / Bacteria: x        MICROBIOLOGY:  Vancomycin Level, Trough: 14.5 (06-20 @ 06:16)  Vancomycin Level, Random: 11.4 (06-19 @ 06:27)  Vancomycin Level, Trough: 7.0 (06-18 @ 18:00)  Vancomycin Level, Trough: 8.1 (06-17 @ 11:01)  Vancomycin Level, Random: 14.5 (06-15 @ 06:45)    Male    Culture - Acid Fast - Sputum w/Smear (collected 24 Jun 2025 09:00)  Source: Trach Asp Tracheal Aspirate  Preliminary Report:    Culture is being performed.    Culture - Blood (collected 16 Jun 2025 06:28)  Source: Blood Blood  Final Report:    No growth at 5 days    Culture - Sputum (collected 15 Kleber 2025 16:44)  Source: Sputum Sputum  Final Report:    Commensal magda consistent with body site    Culture - Blood (collected 14 Jun 2025 15:36)  Source: Blood None  Final Report:    No growth at 5 days    Culture - Blood (collected 02 Jun 2025 18:05)  Source: Blood Blood  Final Report:    No growth at 5 days    Culture - Blood (collected 02 Jun 2025 18:05)  Source: Blood Blood  Final Report:    No growth at 5 days    Culture - Blood (collected 18 Apr 2025 22:18)  Source: Blood Blood  Final Report:    No growth at 5 days    Culture - Blood (collected 18 Apr 2025 22:17)  Source: Blood Blood  Final Report:    No growth at 5 days    Culture - Blood (collected 28 Mar 2025 20:45)  Source: Blood Blood-Peripheral  Final Report:    No growth at 5 days    Culture - Blood (collected 31 Dec 2024 15:45)  Source: .Blood BLOOD  Final Report:    No growth at 5 days      HIV-1/2 Combo Result: Nonreact (06-16-25 @ 06:28)  HIV-1/2 Combo Result: Nonreact (01-04-25 @ 05:58)                      COVID-19 PCR: NotDetec (05-15-25 @ 09:50)          Ferritin: 265 (06-20)            Blood Gas Arterial, Lactate: 0.8 (06-26 @ 04:26)  Blood Gas Arterial, Lactate: 0.8 (06-25 @ 06:17)  Blood Gas Arterial, Lactate: 0.7 (06-25 @ 04:30)  Blood Gas Arterial, Lactate: 0.6 (06-24 @ 04:16)        RADIOLOGY & ADDITIONAL TESTS:  I have personally reviewed the relevant images.   CXR      CT    < from: Xray Chest 1 View- PORTABLE-Routine (Xray Chest 1 View- PORTABLE-Routine in AM.) (06.26.25 @ 07:09) >  INTERPRETATION:  CLINICAL HISTORY: Intubated    COMPARISON: Yesterday.    TECHNIQUE: Portable frontal chest radiograph.    FINDINGS:    Support devices: Nasogastric coursing below diaphragm. Tip not   well-visualized. Endotracheal tube in satisfactory position. Left   subclavian AICD.    Cardiac/mediastinum/hilum: Stable.    Lung parenchyma/Pleura: Right basal opacity/effusion, unchanged. Improved   aeration left lung base.    Skeleton/soft tissues: Stable.      IMPRESSION:    As above    --- End of Report ---    < end of copied text >      WEIGHT  Weight (kg): 83.3 (06-14-25 @ 08:00)  Creatinine: 1.7 mg/dL (06-26-25 @ 05:54)  Creatinine: 1.7 mg/dL (06-25-25 @ 19:43)      All available historical records have been reviewed

## 2025-06-26 NOTE — PROGRESS NOTE ADULT - SUBJECTIVE AND OBJECTIVE BOX
HPI: 70M with PMH of COPD, DM2, HFrEF, CAD, ICM s/p CRT-D, HTN, pAF< eliquis here with LESLIE and bilateral leg swelling, and found to have acute HFrEF and pulmonary edema, on vent, nebs, steroids, and bumex. Will need ischemic eval eventually. Patient was DNR/DNI/trial NIV on recent admission after d/w palliative, but wife opted this admission for intubation. Palliative c/s for GOC.    INTERVAL EVENTS  6/18: patient with no acute distress  6/19: patient intubated, no acute distress  6/26: patient remains intubated no acute distress    ADVANCE DIRECTIVES:    [X ] Full Code [ ] DNR  MOLST  [ ]  Living Will  [ ]   DECISION MAKER(s):  [ ] Health Care Proxy(s)  [ ] Surrogate(s)  [ ] Guardian           Name(s): Phone Number(s): wife, son Neto    BASELINE (I)ADL(s) (prior to admission):  [place]: [ ]Total  [ ] Moderate [ ]Dependent  Palliative Performance Status Version 2:         %    http://npcrc.org/files/news/palliative_performance_scale_ppsv2.pdf    Allergies    Bactrim (Unknown)  sulfa drugs (Unknown)    Intolerances    MEDICATIONS  (STANDING):  albuterol/ipratropium for Nebulization 3 milliLiter(s) Nebulizer every 6 hours  aMIOdarone    Tablet 200 milliGRAM(s) Oral daily  apixaban 5 milliGRAM(s) Enteral Tube every 12 hours  atorvastatin 40 milliGRAM(s) Oral at bedtime  chlorhexidine 0.12% Liquid 15 milliLiter(s) Oral Mucosa every 12 hours  chlorhexidine 2% Cloths 1 Application(s) Topical <User Schedule>  clopidogrel Tablet 75 milliGRAM(s) Enteral Tube daily  dextrose 5%. 1000 milliLiter(s) (75 mL/Hr) IV Continuous <Continuous>  dextrose 5%. 1000 milliLiter(s) (100 mL/Hr) IV Continuous <Continuous>  dextrose 5%. 1000 milliLiter(s) (50 mL/Hr) IV Continuous <Continuous>  dextrose 50% Injectable 25 Gram(s) IV Push once  dextrose 50% Injectable 12.5 Gram(s) IV Push once  dextrose 50% Injectable 25 Gram(s) IV Push once  ezetimibe 10 milliGRAM(s) Oral daily  fentaNYL   Infusion. 0.501 MICROgram(s)/kG/Hr (4.17 mL/Hr) IV Continuous <Continuous>  glucagon  Injectable 1 milliGRAM(s) IntraMuscular once  insulin glargine Injectable (LANTUS) 34 Unit(s) SubCutaneous at bedtime  insulin lispro (ADMELOG) corrective regimen sliding scale   SubCutaneous every 6 hours  insulin lispro Injectable (ADMELOG) 8 Unit(s) SubCutaneous every 6 hours  metoprolol tartrate 25 milliGRAM(s) Oral two times a day  pantoprazole  Injectable 40 milliGRAM(s) IV Push two times a day  propofol Infusion 5 MICROgram(s)/kG/Min (2.54 mL/Hr) IV Continuous <Continuous>  senna 2 Tablet(s) Oral at bedtime    MEDICATIONS  (PRN):  acetaminophen     Tablet .. 650 milliGRAM(s) Oral every 6 hours PRN Temp greater or equal to 38C (100.4F), Mild Pain (1 - 3)  aluminum hydroxide/magnesium hydroxide/simethicone Suspension 30 milliLiter(s) Oral every 4 hours PRN Dyspepsia  dextrose Oral Gel 15 Gram(s) Oral once PRN Blood Glucose LESS THAN 70 milliGRAM(s)/deciliter  guaiFENesin Oral Liquid (Sugar-Free) 200 milliGRAM(s) Oral every 6 hours PRN Cough  melatonin 5 milliGRAM(s) Oral at bedtime PRN Insomnia  midodrine. 5 milliGRAM(s) Oral three times a day PRN Hypotension  ondansetron Injectable 4 milliGRAM(s) IV Push every 8 hours PRN Nausea and/or Vomiting    PRESENT SYMPTOMS: [X ]Unable to obtain due to poor mentation   Source if other than patient:  [ ]Family   [ ]Team   [ X]All review of systems negative including pain and dyspnea unless noted below    All components of pain assessment below addressed. Blank spaces indicate that the patient did/could not complete the assessment.  Pain: [ ]yes [ ]no  QOL impact -   Location -                    Aggravating factors -  Quality -  Radiation -  Timing-  Severity (0-10 scale):  Minimal acceptable level (0-10 scale):     CPOT:    https://www.[facility].org/getattachment/[email or web address]/Critical-Care-Pain-Observation-Tool-(CPOT)    PAIN AD Score: 0  http://geriatrictoolkit.[facility]/cog/painad.pdf (press ctrl +  left click to view)    Dyspnea:                           [ ]None[ ]Mild [ ]Moderate [ ]Severe     Respiratory Distress Observation Scale (RDOS): 2    A score of 0 to 2 signifies little or no respiratory distress, 3 signifies mild distress, scores 4 to 6 indicate moderate distress, and scores greater than 7 signify severe distress  https://www.[email or web address]/sites/default/files/PDFS/459215-ypnletxylod-ygxkxzyr-puwmxylbsjs-nptkz.pdf    Anxiety:                             [ ]None[ ]Mild [ ]Moderate [ ]Severe   Fatigue:                             [ ]None[ ]Mild [ ]Moderate [ ]Severe   Nausea:                             [ ]None[ ]Mild [ ]Moderate [ ]Severe   Loss of appetite:              [ ]None[ ]Mild [ ]Moderate [ ]Severe   Constipation:                    [ ]None[ ]Mild [ ]Moderate [ ]Severe    Other Symptoms:    PHYSICAL EXAM:  Vital Signs Last 24 Hrs  T(C): 35.3 (26 Jun 2025 11:00), Max: 36.2 (25 Jun 2025 15:30)  T(F): 95.5 (26 Jun 2025 11:00), Max: 97.2 (25 Jun 2025 15:30)  HR: 101 (26 Jun 2025 14:01) (80 - 107)  BP: 162/74 (26 Jun 2025 13:01) (116/59 - 163/74)  BP(mean): 107 (26 Jun 2025 13:01) (81 - 107)  RR: 25 (26 Jun 2025 14:01) (21 - 27)  SpO2: 100% (26 Jun 2025 14:01) (98% - 100%)    Parameters below as of 26 Jun 2025 07:00  Patient On (Oxygen Delivery Method): ventilator    O2 Concentration (%): 40    GENERAL:  [X ] No acute distress [ ]Lethargic  [ ]Unarousable  [ ]Verbal  [ ]Non-Verbal [ ]Cachexia    BEHAVIORAL/PSYCH:  [ ]Alert and Oriented x  [ ] Anxiety [ ] Delirium [ ] Agitation [X ] Calm   EYES: [ ] No scleral icterus [ ] Scleral icterus [ ] Closed  ENMT:  [ ]Dry mouth  [ ]No external oral lesions [ X] No external ear or nose lesions  CARDIOVASCULAR:  [ ]Regular [ ]Irregular [ ]Tachy [ ]Not Tachy  [ ]Aubrey [ ] Edema [ ] No edema  PULMONARY:  [ ]Tachypnea  [ ]Audible excessive secretions [X ] No labored breathing [ ] labored breathing  GASTROINTESTINAL: [ ]Soft  [ ]Distended  [ X]Not distended [ ]Non tender [ ]Tender  MUSCULOSKELETAL: [ ]No clubbing [ ] clubbing  [ X] No cyanosis [ ] cyanosis  NEUROLOGIC: [ ]No focal deficits  [ ]Follows commands  [ ]Does not follow commands  [ ]Cognitive impairment  [ ]Dysphagia  [ ]Dysarthria  [ ]Paresis   SKIN: [ ] Jaundiced [X ] Non-jaundiced [ ]Rash [ ]No Rash [ ] Warm [ ] Dry  MISC/LINES: [ ] ET tube [ ] Trach [ ]NGT/OGT [ ]PEG [ ]Mcmahon    CRITICAL CARE:  [ ] Shock Present  [ ]Septic [ ]Cardiogenic [ ]Neurologic [ ]Hypovolemic  [ ]  Vasopressors [ ]  Inotropes   [ ]Respiratory failure present [ ]Mechanical ventilation [ ]Non-invasive ventilatory support [ ]High flow  [ ]Acute  [ ]Chronic [ ]Hypoxic  [ ]Hypercarbic [ ]Other  [ ]Other organ failure     LABS: reviewed by me, notable for: elevated WBC, Na; Ca WNL                                   8.8    28.66 )-----------( 275      ( 26 Jun 2025 05:54 )             30.9     06-26    151[H]  |  113[H]  |  88[HH]  ----------------------------<  175[H]  4.6   |  29  |  1.7[H]    Ca    8.6      26 Jun 2025 05:54  Mg     2.6     06-26          RADIOLOGY & ADDITIONAL STUDIES: CXR personally reviewed by me: 6/25: b/l opacities    PROTEIN CALORIE MALNUTRITION PRESENT: [ ]mild [ ]moderate [ ]severe [ ]underweight [ ]morbid obesity  https://www.andeal.org/vault/7550/web/files/ONC/Table_Clinical%20Characteristics%20to%20Document%20Malnutrition-White%20JV%20et%20al%202012.pdf    Height (cm): 160 (06-14-25 @ 08:00), 162.6 (06-03-25 @ 14:33), 160 (05-30-25 @ 11:11)  Weight (kg): 83.3 (06-14-25 @ 08:00), 75.9 (06-03-25 @ 14:33), 75.7 (05-30-25 @ 11:11)  BMI (kg/m2): 32.5 (06-14-25 @ 08:00), 28.7 (06-03-25 @ 14:33), 29.6 (05-30-25 @ 11:11)    [ ]PPSV2 < or = to 30% [ ]significant weight loss  [ ]poor nutritional intake  [ ]anasarca      [ ]Artificial Nutrition          Palliative Care Spiritual/Emotional Screening Tool Question  Severity (0-4):                    OR                    [X ] Unable to determine/NA  Score of 2 or greater indicates recommendation of Chaplaincy referral  Chaplaincy Referral: [ ] Yes [ ] Refused [ ] Following     Caregiver [place]:  [ ] Yes [ ] No    OR    [x ] Unable to determine. Will assess at later time if appropriate.  Social Work Referral [ ]  Patient and Family Centered Care Referral [ ]    Anticipatory Grief Present: [ ] Yes [ ] No    OR     [ x] Unable to determine. Will assess at later time if appropriate.  Social Work Referral [ ]  Patient and Family Centered Care Referral [ ]    REFERRALS:   [ ]Chaplaincy  [ ]Hospice  [ ]Child Life  [ ]Social Work  [ ]Case management [ ]Holistic Therapy     Palliative care education provided to patient and/or family    Goals of Care Document:     ______________  Wolfgang Ramos MD  Palliative Medicine  [facility]   of Geriatric and Palliative Medicine  [phone]

## 2025-06-26 NOTE — PROGRESS NOTE ADULT - SUBJECTIVE AND OBJECTIVE BOX
DATE OF SERVICE: 06-26-25    Patient remained intubated and sedated.       acetaminophen     Tablet .. 650 milliGRAM(s) Oral every 6 hours PRN  albuterol/ipratropium for Nebulization 3 milliLiter(s) Nebulizer every 6 hours  aluminum hydroxide/magnesium hydroxide/simethicone Suspension 30 milliLiter(s) Oral every 4 hours PRN  aMIOdarone    Tablet 200 milliGRAM(s) Oral daily  apixaban 5 milliGRAM(s) Enteral Tube every 12 hours  atorvastatin 40 milliGRAM(s) Oral at bedtime  chlorhexidine 0.12% Liquid 15 milliLiter(s) Oral Mucosa every 12 hours  chlorhexidine 2% Cloths 1 Application(s) Topical <User Schedule>  clopidogrel Tablet 75 milliGRAM(s) Enteral Tube daily  dextrose 5%. 1000 milliLiter(s) IV Continuous <Continuous>  dextrose 5%. 1000 milliLiter(s) IV Continuous <Continuous>  dextrose 5%. 1000 milliLiter(s) IV Continuous <Continuous>  dextrose 50% Injectable 25 Gram(s) IV Push once  dextrose 50% Injectable 12.5 Gram(s) IV Push once  dextrose 50% Injectable 25 Gram(s) IV Push once  dextrose Oral Gel 15 Gram(s) Oral once PRN  ezetimibe 10 milliGRAM(s) Oral daily  fentaNYL   Infusion. 0.501 MICROgram(s)/kG/Hr IV Continuous <Continuous>  glucagon  Injectable 1 milliGRAM(s) IntraMuscular once  guaiFENesin Oral Liquid (Sugar-Free) 200 milliGRAM(s) Oral every 6 hours PRN  insulin glargine Injectable (LANTUS) 34 Unit(s) SubCutaneous at bedtime  insulin lispro (ADMELOG) corrective regimen sliding scale   SubCutaneous every 6 hours  insulin lispro Injectable (ADMELOG) 8 Unit(s) SubCutaneous every 6 hours  melatonin 5 milliGRAM(s) Oral at bedtime PRN  metoprolol tartrate 25 milliGRAM(s) Oral two times a day  midodrine. 5 milliGRAM(s) Oral three times a day PRN  ondansetron Injectable 4 milliGRAM(s) IV Push every 8 hours PRN  pantoprazole  Injectable 40 milliGRAM(s) IV Push two times a day  propofol Infusion 5 MICROgram(s)/kG/Min IV Continuous <Continuous>  senna 2 Tablet(s) Oral at bedtime                            8.8    28.66 )-----------( 275      ( 26 Jun 2025 05:54 )             30.9       06-26    151[H]  |  113[H]  |  88[HH]  ----------------------------<  175[H]  4.6   |  29  |  1.7[H]    Ca    8.6      26 Jun 2025 05:54  Mg     2.6     06-26    TPro  5.2[L]  /  Alb  2.9[L]  /  TBili  <0.2  /  DBili  x   /  AST  23  /  ALT  27  /  AlkPhos  55  06-26            T(C): 35.3 (06-26-25 @ 11:00), Max: 36.2 (06-25-25 @ 15:30)  HR: 97 (06-26-25 @ 10:00) (80 - 107)  BP: 143/65 (06-26-25 @ 10:00) (116/59 - 143/65)  RR: 24 (06-26-25 @ 11:00) (20 - 27)  SpO2: 100% (06-26-25 @ 10:00) (98% - 100%)  Wt(kg): --    I&O's Summary    25 Jun 2025 07:01  -  26 Jun 2025 07:00  --------------------------------------------------------  IN: 2975.6 mL / OUT: 2020 mL / NET: 955.6 mL    26 Jun 2025 07:01  -  26 Jun 2025 12:49  --------------------------------------------------------  IN: 441.6 mL / OUT: 460 mL / NET: -18.4 mL            PHYSICAL EXAM:  GENERAL:  intubated     CHEST/LUNG: +wheeze B/L  HEART: Regular rate and rhythm; S1&S2  ABDOMEN: Soft, Nontender, Nondistended x 4 quadrants; Bowel sounds present  EXTREMITIES:   Peripheral Pulses Present, B/L LE trace  PSYCH: AAOx3, cooperative, appropriate  NEUROLOGY: WNL  SKIN: WNL    < from: TTE Echo Complete w/o Contrast w/ Doppler (06.16.25 @ 10:37) >  CONCLUSIONS:     1. Left ventricular systolic function is moderately decreased with an   ejection fraction of 31 % by Beck's method of disks.   2. Severe left ventricular hypertrophy.   3. There is severe (grade 3) left ventricular diastolic dysfunction.   4. Normal right ventricular cavity size, with normal wall thickness, and   normal right ventricular systolic function. Tricuspid annular plane   systolic excursion (TAPSE) is 1.5 cm (normal >=1.7 cm).   5. Device lead is visualized in the right heart.   6. Left atrium is moderately dilated.   7. A bioprosthetic valve replacement valve replacement is present in the   aortic position The prosthetic valve is well seated. Trace intravalvular   regurgitation.   8. Trace mitral regurgitation at a blood pressure of 119/75 mmHg.   9. Mild tricuspid regurgitation.  10. Estimated pulmonary artery systolic pressure is 44 mmHg, consistent   with mild pulmonary hypertension.  11. No pericardial effusion seen.      < end of copied text >    Assessment and Recommendations:  70 year old male with past medical history of  COPD (on 2L home O2), DM 2  HFrEF 30%, CAD,  ischemic cardiomyopathy s/p CRT-D, HTN, paroxysmal A-fib (status post ablation 1/14/25) on amiodarone and  Eliquis who presented to ED w c/o LESLIE and b/l  leg swelling.   Patient remained intubated in ICU. Continue on IV bumex for volume overload.     Impression:  #SOB/LESLIE: Multifactorial  #HFrEF (EF 30%, s/p CRT-D)  #CAD s/p PCI to RCA and LAD  #NSTEMI/ Hx of frequent NSVT   #Paroxysmal AFib on Eliquis      Recommendation:  - Patient remains intubated and sedated with fentanyl. warm and perfusing.   - diuretics on hold as patient appears euvolemic and with rising cr and NA  - Na slightly improved today   - ID following for possible infection   - Strict intake and output, maintain negative balance for now  - Restart home GDMT for HFrEF, when stable/medically appropriate   - Pt needs ischemic eval, but has refused in the past   - Recommend lifelong ac if no contraindications for history of Af  - Recommedn continuing sapt if no contraindications for history of prior PCI   - Monitor H/H closely   - Ongoing GOC discussion     Armando Herron MD

## 2025-06-26 NOTE — PROGRESS NOTE ADULT - SUBJECTIVE AND OBJECTIVE BOX
Nephrology Progress Note    MONIQUE LYONS  [id number]  70y  Male    S:  Patient is seen and examined, events over the last 24h noted.    O:  Allergies:  Bactrim (Unknown)  sulfa drugs (Unknown)    Hospital Medications:   MEDICATIONS  (STANDING):  albuterol/ipratropium for Nebulization 3 milliLiter(s) Nebulizer every 6 hours  aMIOdarone    Tablet 200 milliGRAM(s) Oral daily  apixaban 5 milliGRAM(s) Enteral Tube every 12 hours  atorvastatin 40 milliGRAM(s) Oral at bedtime  chlorhexidine 0.12% Liquid 15 milliLiter(s) Oral Mucosa every 12 hours  chlorhexidine 2% Cloths 1 Application(s) Topical <User Schedule>  clopidogrel Tablet 75 milliGRAM(s) Enteral Tube daily  ezetimibe 10 milliGRAM(s) Oral daily  fentaNYL   Infusion. 0.501 MICROgram(s)/kG/Hr (4.17 mL/Hr) IV Continuous <Continuous>  glucagon  Injectable 1 milliGRAM(s) IntraMuscular once  insulin glargine Injectable (LANTUS) 34 Unit(s) SubCutaneous at bedtime  insulin lispro (ADMELOG) corrective regimen sliding scale   SubCutaneous every 6 hours  insulin lispro Injectable (ADMELOG) 8 Unit(s) SubCutaneous every 6 hours  metoprolol tartrate 25 milliGRAM(s) Oral two times a day  pantoprazole  Injectable 40 milliGRAM(s) IV Push two times a day  propofol Infusion 5 MICROgram(s)/kG/Min (2.54 mL/Hr) IV Continuous <Continuous>  senna 2 Tablet(s) Oral at bedtime    MEDICATIONS  (PRN):  acetaminophen     Tablet .. 650 milliGRAM(s) Oral every 6 hours PRN Temp greater or equal to 38C (100.4F), Mild Pain (1 - 3)  aluminum hydroxide/magnesium hydroxide/simethicone Suspension 30 milliLiter(s) Oral every 4 hours PRN Dyspepsia  dextrose Oral Gel 15 Gram(s) Oral once PRN Blood Glucose LESS THAN 70 milliGRAM(s)/deciliter  guaiFENesin Oral Liquid (Sugar-Free) 200 milliGRAM(s) Oral every 6 hours PRN Cough  melatonin 5 milliGRAM(s) Oral at bedtime PRN Insomnia  midodrine. 5 milliGRAM(s) Oral three times a day PRN Hypotension  ondansetron Injectable 4 milliGRAM(s) IV Push every 8 hours PRN Nausea and/or Vomiting    Home Medications:  atorvastatin 40 mg oral tablet: 1 tab(s) orally once a day (25 May 2025 03:34)  clopidogrel 75 mg oral tablet: 1 tab(s) orally once a day (25 May 2025 03:34)  ezetimibe 10 mg oral tablet: 1 orally once a day (25 May 2025 03:37)  famotidine 40 mg oral tablet: 1 tab(s) orally once a day (25 May 2025 03:37)  fenofibrate 145 mg oral tablet: 1 orally once a day (25 May 2025 03:37)  melatonin 5 mg oral tablet: 1 tab(s) orally once a day (at bedtime) (25 May 2025 03:37)  metFORMIN 500 mg oral tablet: 1 tab(s) orally 2 times a day (25 May 2025 03:37)  metoprolol succinate 50 mg oral tablet, extended release: 1 tab(s) orally once a day (25 May 2025 03:37)  Soaanz 20 mg oral tablet: 1 tab(s) orally once a day (2025 15:53)  spironolactone 25 mg oral tablet: 1 tab(s) orally every 24 hours (25 May 2025 04:25)  Tresiba 100 units/mL subcutaneous solution: 34 unit(s) subcutaneous once a day (in the morning) (25 May 2025 04:25)      VITALS:  Daily     Daily Weight in k (2025 05:19)  T(F): 95.5 (25 @ 15:02), Max: 97.2 (25 @ 23:00)  HR: 105 (25 @ 15:19)  BP: 162/74 (25 @ 13:01)  RR: 22 (25 @ 15:02)  SpO2: 100% (25 @ 15:19)  Wt(kg): --  I&O's Detail    2025 07:01  -  2025 07:00  --------------------------------------------------------  IN:    dextrose 5%: 525 mL    FentaNYL: 293.1 mL    FentaNYL: 166.5 mL    Free Water: 750 mL    Peptamen A.F.: 1241 mL  Total IN: 2975.6 mL    OUT:    Indwelling Catheter - Urethral (mL): 2020 mL  Total OUT: 2020 mL    Total NET: 955.6 mL      2025 07: 17:22  --------------------------------------------------------  IN:    dextrose 5%: 675 mL    FentaNYL: 100 mL    Free Water: 250 mL    Peptamen A.F.: 146 mL  Total IN: 1171 mL    OUT:    Indwelling Catheter - Urethral (mL): 755 mL  Total OUT: 755 mL    Total NET: 416 mL        I&O's Summary    2025 07: 07:00  --------------------------------------------------------  IN: 2975.6 mL / OUT: 2020 mL / NET: 955.6 mL    2025 07: 17:22  --------------------------------------------------------  IN: 1171 mL / OUT: 755 mL / NET: 416 mL          PHYSICAL EXAM:  Gen: intubated/ventilated  Chest: b/l breath sounds  Abd: soft  Extremities: no edema      LABS:  ABG - ( 2025 04:26 )  pH, Arterial: 7.33  pH, Blood: x     /  pCO2: 59    /  pO2: 116   / HCO3: 31    / Base Excess: 4.1   /  SaO2: 99.7      Blood Gas Arterial - Sodium: 145 mmol/L (25 @ 04:26)  Blood Gas Arterial - Calcium, Ionized: 1.31 mmol/L (25 @ 04:26)  Blood Gas Arterial - Sodium: 144 mmol/L (25 @ 06:17)        151[H]  |  113[H]  |  88[HH]  ----------------------------<  175[H]  4.6   |  29  |  1.7[H]    Ca    8.6      2025 05:54  Mg     2.6         TPro  5.2[L]  /  Alb  2.9[L]  /  TBili  <0.2  /  DBili      /  AST  23  /  ALT  27  /  AlkPhos  55      Phosphorus: 2.9 mg/dL (25 @ 06:20)  Phosphorus: 2.7 mg/dL (25 @ 06:32)    Sodium: 151 mmol/L (25 @ 05:54)  Sodium: 152 mmol/L (25 @ 19:43)  Sodium: 150 mmol/L (25 @ 05:59)  Sodium: 148 mmol/L (25 @ 06:09)  Sodium: 148 mmol/L (25 @ 06:20)  Sodium: 149 mmol/L (25 @ 06:23)  Sodium: 148 mmol/L (25 @ 06:32)  Sodium: 147 mmol/L (25 @ 06:16)  Sodium: 142 mmol/L (25 @ 06:27)    Osmolality, Random Urine: 335 mos/kg [50 - 1200] (25 @ 08:30)    Sodium, Random Urine: 88.0 mmoL/L (25 @ 11:30)    Thyroid Stimulating Hormone, Serum: 1.65 uIU/mL [0.27 - 4.20] (25 @ 11:44)                            8.8    28.66 )-----------( 275      ( 2025 05:54 )             30.9     Mean Cell Volume: 87.8 fl (25 @ 05:54)    Iron Total: 23 ug/dL (25 @ 06:16)  % Saturation, Iron: 11 % (25 @ 06:16)      % Saturation, Iron: 11 % (25 @ 06:16)    Urine Studies:  Protein, Urine: 30 mg/dL (25 @ 11:30)  White Blood Cell - Urine: 2 /HPF (25 @ 11:30)  Red Blood Cell - Urine: 2 /HPF (25 @ 11:30)  Protein, Urine: 300 mg/dL (23 @ 14:02)  White Blood Cell - Urine: 2 /HPF (23 @ 14:02)  Red Blood Cell - Urine: 1 /HPF (23 @ 14:02)  Protein, Urine: 30 mg/dL (19 @ 17:40)  White Blood Cell - Urine: 0 /HPF (19 @ 17:40)  Red Blood Cell - Urine: 0 /HPF (19 @ 17:40)      Urea Nitrogen,  Random Urine: 213 mg/dL (25 @ 11:30)    Culture Results:   Culture is being performed. ( @ 09:00)  Culture Results:   No growth at 5 days ( @ 06:28)    Creatinine trend:  Creatinine: 1.7 mg/dL (25 @ 05:54)  Creatinine: 1.7 mg/dL (25 @ 19:43)  Creatinine: 1.7 mg/dL (25 @ 05:59)  Creatinine: 1.4 mg/dL (25 @ 06:09)  Creatinine: 1.4 mg/dL (25 @ 06:20)  Creatinine: 1.3 mg/dL (25 @ 06:23)    Hepatitis B Surface Antigen: Nonreact (25 @ 05:58)  Hepatitis C Virus S/CO Ratio: 0.08 S/CO (25 @ 05:58)  Hepatitis B Core IgM Antibody: Nonreact (25 @ 05:58)    US Renal:   ACC: [id number] EXAM:  US KIDNEY(S)   ORDERED BY: ABHINAV GARCIA     PROCEDURE DATE:  2025          INTERPRETATION:  CLINICAL INFORMATION: twan.    COMPARISON: CT dated 1/3/2025    TECHNIQUE: Sonography of the kidneys.    FINDINGS:  Right kidney: 11.2 cm. No hydronephrosis.    Left kidney: 10.4 cm. No hydronephrosis.    IMPRESSION:  No hydronephrosis.        --- End of Report ---            REY HAYDEN MD; Attending Radiologist  This document has been electronically signed. 2025 12:32PM (25 @ 12:25)

## 2025-06-26 NOTE — PROGRESS NOTE ADULT - SUBJECTIVE AND OBJECTIVE BOX
Patient is a 70y old  Male who presents with a chief complaint of SOB (25 Jun 2025 15:04)        Over Night Events: No acute events on fentanyl and propofol         ROS:     All ROS are negative except HPI         PHYSICAL EXAM    ICU Vital Signs Last 24 Hrs  T(C): 35.6 (26 Jun 2025 07:01), Max: 37.3 (25 Jun 2025 11:00)  T(F): 96.1 (26 Jun 2025 07:01), Max: 99.1 (25 Jun 2025 11:00)  HR: 105 (26 Jun 2025 05:00) (80 - 114)  BP: 130/60 (26 Jun 2025 05:00) (116/59 - 167/75)  BP(mean): 86 (26 Jun 2025 05:00) (81 - 108)  ABP: --  ABP(mean): --  RR: 24 (26 Jun 2025 07:01) (20 - 27)  SpO2: 100% (26 Jun 2025 05:19) (98% - 100%)    O2 Parameters below as of 26 Jun 2025 01:00      O2 Concentration (%): 0.4    CONSTITUTIONAL:  NAD    ENT:   Airway patent,   ET    EYES:   Pupils equal,   Round and reactive to light.    CARDIAC:   Normal rate,   Regular rhythm.      Vascular:  Normal systolic impulse  No Carotid bruits    RESPIRATORY:   No wheezing  Bilateral BS    GASTROINTESTINAL:  Abdomen soft,   Non-tender,   No guarding,   + BS    MUSCULOSKELETAL:   No clubbing, cyanosis    NEUROLOGICAL:   Intubated   No motor  deficits.    SKIN:   Skin normal color for race,   Warm and dry and intact.   No evidence of rash.    06-25-25 @ 07:01  -  06-26-25 @ 07:00  --------------------------------------------------------  IN:    dextrose 5%: 525 mL    FentaNYL: 293.1 mL    FentaNYL: 166.5 mL    Free Water: 750 mL    Peptamen A.F.: 1241 mL  Total IN: 2975.6 mL    OUT:    Indwelling Catheter - Urethral (mL): 2020 mL  Total OUT: 2020 mL    Total NET: 955.6 mL      06-26-25 @ 07:01  -  06-26-25 @ 07:24  --------------------------------------------------------  IN:  Total IN: 0 mL    OUT:    Indwelling Catheter - Urethral (mL): 50 mL  Total OUT: 50 mL    Total NET: -50 mL          LABS:                            9.9    22.48 )-----------( 263      ( 25 Jun 2025 05:59 )             35.2                                               06-25    152[H]  |  115[H]  |  85[HH]  ----------------------------<  177[H]  4.7   |  28  |  1.7[H]    Ca    8.5      25 Jun 2025 19:43  Mg     2.5     06-25    TPro  5.4[L]  /  Alb  2.9[L]  /  TBili  <0.2  /  DBili  x   /  AST  23  /  ALT  33  /  AlkPhos  63  06-25                                             Urinalysis Basic - ( 25 Jun 2025 19:43 )    Color: x / Appearance: x / SG: x / pH: x  Gluc: 177 mg/dL / Ketone: x  / Bili: x / Urobili: x   Blood: x / Protein: x / Nitrite: x   Leuk Esterase: x / RBC: x / WBC x   Sq Epi: x / Non Sq Epi: x / Bacteria: x                                                  LIVER FUNCTIONS - ( 25 Jun 2025 05:59 )  Alb: 2.9 g/dL / Pro: 5.4 g/dL / ALK PHOS: 63 U/L / ALT: 33 U/L / AST: 23 U/L / GGT: x                                                  Culture - Acid Fast - Sputum w/Smear (collected 24 Jun 2025 09:00)  Source: Trach Asp Tracheal Aspirate  Preliminary Report (25 Jun 2025 23:07):    Culture is being performed.                                                   Mode: AC/ CMV (Assist Control/ Continuous Mandatory Ventilation)  RR (machine): 26  TV (machine): 400  FiO2: 40  PEEP: 8  ITime: 0.8  MAP: 12  PIP: 21                                      ABG - ( 26 Jun 2025 04:26 )  pH, Arterial: 7.33  pH, Blood: x     /  pCO2: 59    /  pO2: 116   / HCO3: 31    / Base Excess: 4.1   /  SaO2: 99.7                MEDICATIONS  (STANDING):  aMIOdarone    Tablet 200 milliGRAM(s) Oral daily  apixaban 5 milliGRAM(s) Enteral Tube every 12 hours  atorvastatin 40 milliGRAM(s) Oral at bedtime  chlorhexidine 0.12% Liquid 15 milliLiter(s) Oral Mucosa every 12 hours  chlorhexidine 2% Cloths 1 Application(s) Topical <User Schedule>  clopidogrel Tablet 75 milliGRAM(s) Enteral Tube daily  dextrose 5%. 1000 milliLiter(s) (75 mL/Hr) IV Continuous <Continuous>  dextrose 5%. 1000 milliLiter(s) (100 mL/Hr) IV Continuous <Continuous>  dextrose 5%. 1000 milliLiter(s) (50 mL/Hr) IV Continuous <Continuous>  dextrose 50% Injectable 25 Gram(s) IV Push once  dextrose 50% Injectable 12.5 Gram(s) IV Push once  dextrose 50% Injectable 25 Gram(s) IV Push once  ezetimibe 10 milliGRAM(s) Oral daily  fentaNYL   Infusion. 0.501 MICROgram(s)/kG/Hr (4.17 mL/Hr) IV Continuous <Continuous>  glucagon  Injectable 1 milliGRAM(s) IntraMuscular once  insulin glargine Injectable (LANTUS) 34 Unit(s) SubCutaneous at bedtime  insulin lispro (ADMELOG) corrective regimen sliding scale   SubCutaneous every 6 hours  insulin lispro Injectable (ADMELOG) 8 Unit(s) SubCutaneous every 6 hours  metoprolol tartrate 25 milliGRAM(s) Oral two times a day  pantoprazole  Injectable 40 milliGRAM(s) IV Push two times a day  propofol Infusion 5 MICROgram(s)/kG/Min (2.54 mL/Hr) IV Continuous <Continuous>  senna 2 Tablet(s) Oral at bedtime    MEDICATIONS  (PRN):  acetaminophen     Tablet .. 650 milliGRAM(s) Oral every 6 hours PRN Temp greater or equal to 38C (100.4F), Mild Pain (1 - 3)  albuterol/ipratropium for Nebulization 3 milliLiter(s) Nebulizer every 6 hours PRN Shortness of Breath and/or Wheezing  aluminum hydroxide/magnesium hydroxide/simethicone Suspension 30 milliLiter(s) Oral every 4 hours PRN Dyspepsia  dextrose Oral Gel 15 Gram(s) Oral once PRN Blood Glucose LESS THAN 70 milliGRAM(s)/deciliter  guaiFENesin Oral Liquid (Sugar-Free) 200 milliGRAM(s) Oral every 6 hours PRN Cough  melatonin 5 milliGRAM(s) Oral at bedtime PRN Insomnia  midodrine. 5 milliGRAM(s) Oral three times a day PRN Hypotension  ondansetron Injectable 4 milliGRAM(s) IV Push every 8 hours PRN Nausea and/or Vomiting      New X-rays reviewed:                                                                                  ECHO         Patient is a 70y old  Male who presents with a chief complaint of SOB (25 Jun 2025 15:04)        Over Night Events: No acute events on fentanyl and propofol   no pressors     ROS:     All ROS are negative except HPI         PHYSICAL EXAM    ICU Vital Signs Last 24 Hrs  T(C): 35.6 (26 Jun 2025 07:01), Max: 37.3 (25 Jun 2025 11:00)  T(F): 96.1 (26 Jun 2025 07:01), Max: 99.1 (25 Jun 2025 11:00)  HR: 105 (26 Jun 2025 05:00) (80 - 114)  BP: 130/60 (26 Jun 2025 05:00) (116/59 - 167/75)  BP(mean): 86 (26 Jun 2025 05:00) (81 - 108)  ABP: --  ABP(mean): --  RR: 24 (26 Jun 2025 07:01) (20 - 27)  SpO2: 100% (26 Jun 2025 05:19) (98% - 100%)    O2 Parameters below as of 26 Jun 2025 01:00      O2 Concentration (%): 0.4    CONSTITUTIONAL:  NAD    ENT:   Airway patent,   ET    EYES:   Pupils equal,   Round and reactive to light.    CARDIAC:   Normal rate,   Regular rhythm.      Vascular:  Normal systolic impulse  No Carotid bruits    RESPIRATORY:   No wheezing  Bilateral BS    GASTROINTESTINAL:  Abdomen soft,   Non-tender,   No guarding,   + BS    MUSCULOSKELETAL:   No clubbing, cyanosis    NEUROLOGICAL:   Intubated   No motor  deficits.    SKIN:   Skin normal color for race,   Warm and dry and intact.   No evidence of rash.    06-25-25 @ 07:01  -  06-26-25 @ 07:00  --------------------------------------------------------  IN:    dextrose 5%: 525 mL    FentaNYL: 293.1 mL    FentaNYL: 166.5 mL    Free Water: 750 mL    Peptamen A.F.: 1241 mL  Total IN: 2975.6 mL    OUT:    Indwelling Catheter - Urethral (mL): 2020 mL  Total OUT: 2020 mL    Total NET: 955.6 mL      06-26-25 @ 07:01  -  06-26-25 @ 07:24  --------------------------------------------------------  IN:  Total IN: 0 mL    OUT:    Indwelling Catheter - Urethral (mL): 50 mL  Total OUT: 50 mL    Total NET: -50 mL          LABS:                            9.9    22.48 )-----------( 263      ( 25 Jun 2025 05:59 )             35.2                                               06-25    152[H]  |  115[H]  |  85[HH]  ----------------------------<  177[H]  4.7   |  28  |  1.7[H]    Ca    8.5      25 Jun 2025 19:43  Mg     2.5     06-25    TPro  5.4[L]  /  Alb  2.9[L]  /  TBili  <0.2  /  DBili  x   /  AST  23  /  ALT  33  /  AlkPhos  63  06-25                                             Urinalysis Basic - ( 25 Jun 2025 19:43 )    Color: x / Appearance: x / SG: x / pH: x  Gluc: 177 mg/dL / Ketone: x  / Bili: x / Urobili: x   Blood: x / Protein: x / Nitrite: x   Leuk Esterase: x / RBC: x / WBC x   Sq Epi: x / Non Sq Epi: x / Bacteria: x                                                  LIVER FUNCTIONS - ( 25 Jun 2025 05:59 )  Alb: 2.9 g/dL / Pro: 5.4 g/dL / ALK PHOS: 63 U/L / ALT: 33 U/L / AST: 23 U/L / GGT: x                                                  Culture - Acid Fast - Sputum w/Smear (collected 24 Jun 2025 09:00)  Source: Trach Asp Tracheal Aspirate  Preliminary Report (25 Jun 2025 23:07):    Culture is being performed.                                                   Mode: AC/ CMV (Assist Control/ Continuous Mandatory Ventilation)  RR (machine): 26  TV (machine): 400  FiO2: 40  PEEP: 8  ITime: 0.8  MAP: 12  PIP: 21                                      ABG - ( 26 Jun 2025 04:26 )  pH, Arterial: 7.33  pH, Blood: x     /  pCO2: 59    /  pO2: 116   / HCO3: 31    / Base Excess: 4.1   /  SaO2: 99.7                MEDICATIONS  (STANDING):  aMIOdarone    Tablet 200 milliGRAM(s) Oral daily  apixaban 5 milliGRAM(s) Enteral Tube every 12 hours  atorvastatin 40 milliGRAM(s) Oral at bedtime  chlorhexidine 0.12% Liquid 15 milliLiter(s) Oral Mucosa every 12 hours  chlorhexidine 2% Cloths 1 Application(s) Topical <User Schedule>  clopidogrel Tablet 75 milliGRAM(s) Enteral Tube daily  dextrose 5%. 1000 milliLiter(s) (75 mL/Hr) IV Continuous <Continuous>  dextrose 5%. 1000 milliLiter(s) (100 mL/Hr) IV Continuous <Continuous>  dextrose 5%. 1000 milliLiter(s) (50 mL/Hr) IV Continuous <Continuous>  dextrose 50% Injectable 25 Gram(s) IV Push once  dextrose 50% Injectable 12.5 Gram(s) IV Push once  dextrose 50% Injectable 25 Gram(s) IV Push once  ezetimibe 10 milliGRAM(s) Oral daily  fentaNYL   Infusion. 0.501 MICROgram(s)/kG/Hr (4.17 mL/Hr) IV Continuous <Continuous>  glucagon  Injectable 1 milliGRAM(s) IntraMuscular once  insulin glargine Injectable (LANTUS) 34 Unit(s) SubCutaneous at bedtime  insulin lispro (ADMELOG) corrective regimen sliding scale   SubCutaneous every 6 hours  insulin lispro Injectable (ADMELOG) 8 Unit(s) SubCutaneous every 6 hours  metoprolol tartrate 25 milliGRAM(s) Oral two times a day  pantoprazole  Injectable 40 milliGRAM(s) IV Push two times a day  propofol Infusion 5 MICROgram(s)/kG/Min (2.54 mL/Hr) IV Continuous <Continuous>  senna 2 Tablet(s) Oral at bedtime    MEDICATIONS  (PRN):  acetaminophen     Tablet .. 650 milliGRAM(s) Oral every 6 hours PRN Temp greater or equal to 38C (100.4F), Mild Pain (1 - 3)  albuterol/ipratropium for Nebulization 3 milliLiter(s) Nebulizer every 6 hours PRN Shortness of Breath and/or Wheezing  aluminum hydroxide/magnesium hydroxide/simethicone Suspension 30 milliLiter(s) Oral every 4 hours PRN Dyspepsia  dextrose Oral Gel 15 Gram(s) Oral once PRN Blood Glucose LESS THAN 70 milliGRAM(s)/deciliter  guaiFENesin Oral Liquid (Sugar-Free) 200 milliGRAM(s) Oral every 6 hours PRN Cough  melatonin 5 milliGRAM(s) Oral at bedtime PRN Insomnia  midodrine. 5 milliGRAM(s) Oral three times a day PRN Hypotension  ondansetron Injectable 4 milliGRAM(s) IV Push every 8 hours PRN Nausea and/or Vomiting      New X-rays reviewed:                                                                                  ECHO

## 2025-06-26 NOTE — PROGRESS NOTE ADULT - SUBJECTIVE AND OBJECTIVE BOX
24H events:    Patient is a 70y old Male who presents with a chief complaint of SOB (26 Jun 2025 07:46)    Primary diagnosis of CHF exacerbation  Today is hospital day 12d. This morning patient was seen and examined at bedside, resting comfortably in bed.    No acute or major events overnight.    Code Status:    Family communication:  Contact date:  Name of person contacted:  Relationship to patient:  Communication details:  What matters most:    PAST MEDICAL & SURGICAL HISTORY  CHF (congestive heart failure)    Diabetes    CAD (coronary artery disease)    Chronic obstructive pulmonary disease (COPD)    HTN (hypertension)    Stented coronary artery    Dyslipidemia    GERD (gastroesophageal reflux disease)    Afib    CVA (cerebral vascular accident)    H/O heart artery stent    Heart valve replaced  aortic    History of Nissen fundoplication      SOCIAL HISTORY:  Social History:      ALLERGIES:  Bactrim (Unknown)  sulfa drugs (Unknown)    MEDICATIONS:  STANDING MEDICATIONS  albuterol/ipratropium for Nebulization 3 milliLiter(s) Nebulizer every 6 hours  aMIOdarone    Tablet 200 milliGRAM(s) Oral daily  apixaban 5 milliGRAM(s) Enteral Tube every 12 hours  atorvastatin 40 milliGRAM(s) Oral at bedtime  chlorhexidine 0.12% Liquid 15 milliLiter(s) Oral Mucosa every 12 hours  chlorhexidine 2% Cloths 1 Application(s) Topical <User Schedule>  clopidogrel Tablet 75 milliGRAM(s) Enteral Tube daily  dextrose 5%. 1000 milliLiter(s) IV Continuous <Continuous>  dextrose 5%. 1000 milliLiter(s) IV Continuous <Continuous>  dextrose 5%. 1000 milliLiter(s) IV Continuous <Continuous>  dextrose 50% Injectable 25 Gram(s) IV Push once  dextrose 50% Injectable 12.5 Gram(s) IV Push once  dextrose 50% Injectable 25 Gram(s) IV Push once  ezetimibe 10 milliGRAM(s) Oral daily  fentaNYL   Infusion. 0.501 MICROgram(s)/kG/Hr IV Continuous <Continuous>  glucagon  Injectable 1 milliGRAM(s) IntraMuscular once  insulin glargine Injectable (LANTUS) 34 Unit(s) SubCutaneous at bedtime  insulin lispro (ADMELOG) corrective regimen sliding scale   SubCutaneous every 6 hours  insulin lispro Injectable (ADMELOG) 8 Unit(s) SubCutaneous every 6 hours  metoprolol tartrate 25 milliGRAM(s) Oral two times a day  pantoprazole  Injectable 40 milliGRAM(s) IV Push two times a day  propofol Infusion 5 MICROgram(s)/kG/Min IV Continuous <Continuous>  senna 2 Tablet(s) Oral at bedtime    PRN MEDICATIONS  acetaminophen     Tablet .. 650 milliGRAM(s) Oral every 6 hours PRN  aluminum hydroxide/magnesium hydroxide/simethicone Suspension 30 milliLiter(s) Oral every 4 hours PRN  dextrose Oral Gel 15 Gram(s) Oral once PRN  guaiFENesin Oral Liquid (Sugar-Free) 200 milliGRAM(s) Oral every 6 hours PRN  melatonin 5 milliGRAM(s) Oral at bedtime PRN  midodrine. 5 milliGRAM(s) Oral three times a day PRN  ondansetron Injectable 4 milliGRAM(s) IV Push every 8 hours PRN    VITALS:   T(F): 95.5  HR: 97  BP: 143/65  RR: 24  SpO2: 100%    PHYSICAL EXAM:      General: No apparent distress  Cardiovascular: S1, S2  Gastrointestinal: Soft, Non-tender, Non-distended  Respiratory: vented  Neurologic: sedated  Dermatologic: Skin dry        (  ) Indwelling Mcmahon Catheter:   Date insterted:    Reason (  ) Critical illness     (  ) urinary retention    (  ) Accurate Ins/Outs Monitoring     (  ) CMO patient    (  ) Central Line:   Date inserted:  Location: (  ) Right IJ     (  ) Left IJ     (  ) Right Fem     (  ) Left Fem    (  ) SPC        (  ) pigtail       (  ) PEG tube       (  ) colostomy       (  ) jejunostomy  (  ) U-Dall    LABS:                        8.8    28.66 )-----------( 275      ( 26 Jun 2025 05:54 )             30.9     06-26    151[H]  |  113[H]  |  88[HH]  ----------------------------<  175[H]  4.6   |  29  |  1.7[H]    Ca    8.6      26 Jun 2025 05:54  Mg     2.6     06-26    TPro  5.2[L]  /  Alb  2.9[L]  /  TBili  <0.2  /  DBili  x   /  AST  23  /  ALT  27  /  AlkPhos  55  06-26      Urinalysis Basic - ( 26 Jun 2025 05:54 )    Color: x / Appearance: x / SG: x / pH: x  Gluc: 175 mg/dL / Ketone: x  / Bili: x / Urobili: x   Blood: x / Protein: x / Nitrite: x   Leuk Esterase: x / RBC: x / WBC x   Sq Epi: x / Non Sq Epi: x / Bacteria: x      ABG - ( 26 Jun 2025 04:26 )  pH, Arterial: 7.33  pH, Blood: x     /  pCO2: 59    /  pO2: 116   / HCO3: 31    / Base Excess: 4.1   /  SaO2: 99.7                  Culture - Acid Fast - Sputum w/Smear (collected 24 Jun 2025 09:00)  Source: Trach Asp Tracheal Aspirate  Preliminary Report (25 Jun 2025 23:07):    Culture is being performed.          RADIOLOGY:

## 2025-06-26 NOTE — PROGRESS NOTE ADULT - ASSESSMENT
70 year old male with past medical history of  COPD (on 2L home O2), DM 2  HFrEF 30%, CAD,  ischemic cardiomyopathy s/p CRT-D, HTN, paroxysmal A-fib (status post ablation 1/14/25) on amiodarone and  Eliquis who presented to ED w c/o shortness of breath and b/l  leg swelling.     #Acute on chronic HFrEF decompensated heart failure EF 31%   #Anemia   #Acute pulmonary edema  #COPD, not in active exacerbation  #pulm HTN multifactorial secondary to cardiomyopathy reduced EF, likely LAW, baseline COPD  #chronic hypoxia from all the above  #B/L small pleural effusions due to pulmonary edema s/p intubation   - Unclear cause of fevers- Possible drug induced vs Ischemic Colitis  - Cardiology follow up for diuretics   - D/C Vanc/Cefepime (6/23). Received full course of abx.  - hold bumex for now   - do SAT   - albuterol Q4 hrs standing   - might need to go back on precedex .   - daily abgs and x-ray chest while intubated     #Hypernatremia   - free water 250cc Q4 hrs    - D5w 70cc/ hr   - follow bmp   - fu nephrology             DVT PPX: eliques  GI PPX: pantoprazole   DIET: tube feed    ACTIVITY:   CODE STATUS: on prior molst pt is dnr, dni  - [facility]. pt previously opted for dnr, dni on prior MOLST but was intubated on this admission     DISPOSITION:     PENDING:   diuretics, code status , palliative care, nephrology fu/

## 2025-06-26 NOTE — PROGRESS NOTE ADULT - ASSESSMENT
70M with PMH of COPD, DM2, HFrEF, CAD, ICM s/p CRT-D, HTN, pAF< eliquis here with LESLIE and bilateral leg swelling, and found to have acute HFrEF and pulmonary edema, on vent, nebs, steroids, and bumex. Will need ischemic eval eventually. Patient was DNR/DNI/trial NIV on recent admission after d/w palliative, but wife opted this admission for intubation. Palliative c/s for GOC.    - see GOC above, d/w son Neto (HCP)   - completed course of IV abx  - acutely ill in ICU, dependent on vent, care per ICU team  - no acute symptoms  - d/w medical team  - will follow    ______________  Wolfgang Ramos MD  Palliative Medicine  [facility]   [facility]  [phone]

## 2025-06-26 NOTE — PROGRESS NOTE ADULT - ASSESSMENT
KAREN / CKD 3 / CRS / Hypernatremia  Acute respiratory failure / ADHF / HFrEF / COPD    - increase free water to 300ml q3h and cont d5w peripherally / monitor Na level q12h, if not trending down increase free water flushes

## 2025-06-27 NOTE — PROGRESS NOTE ADULT - SUBJECTIVE AND OBJECTIVE BOX
HPI: 70M with PMH of COPD, DM2, HFrEF, CAD, ICM s/p CRT-D, HTN, pAF< eliquis here with LESLIE and bilateral leg swelling, and found to have acute HFrEF and pulmonary edema, on vent, nebs, steroids, and bumex. Will need ischemic eval eventually. Patient was DNR/DNI/trial NIV on recent admission after d/w palliative, but wife opted this admission for intubation. Palliative c/s for GOC.    INTERVAL EVENTS  6/18: patient with no acute distress  6/19: patient intubated, no acute distress  6/26: patient remains intubated no acute distress  6/27: remains intubated    ADVANCE DIRECTIVES:    [X ] Full Code [ ] DNR  MOLST  [ ]  Living Will  [ ]   DECISION MAKER(s):  [ ] Health Care Proxy(s)  [ ] Surrogate(s)  [ ] Guardian           Name(s): Phone Number(s): wife, son Neto    BASELINE (I)ADL(s) (prior to admission):  [place]: [ ]Total  [ ] Moderate [ ]Dependent  Palliative Performance Status Version 2:         %    http://npcrc.org/files/news/palliative_performance_scale_ppsv2.pdf    Allergies    Bactrim (Unknown)  sulfa drugs (Unknown)    Intolerances    MEDICATIONS  (STANDING):  albuterol/ipratropium for Nebulization 3 milliLiter(s) Nebulizer every 6 hours  aMIOdarone    Tablet 200 milliGRAM(s) Oral daily  apixaban 5 milliGRAM(s) Enteral Tube every 12 hours  atorvastatin 40 milliGRAM(s) Oral at bedtime  chlorhexidine 0.12% Liquid 15 milliLiter(s) Oral Mucosa every 12 hours  chlorhexidine 2% Cloths 1 Application(s) Topical <User Schedule>  clopidogrel Tablet 75 milliGRAM(s) Enteral Tube daily  dextrose 5%. 1000 milliLiter(s) (100 mL/Hr) IV Continuous <Continuous>  dextrose 5%. 1000 milliLiter(s) (50 mL/Hr) IV Continuous <Continuous>  dextrose 50% Injectable 25 Gram(s) IV Push once  dextrose 50% Injectable 12.5 Gram(s) IV Push once  dextrose 50% Injectable 25 Gram(s) IV Push once  ezetimibe 10 milliGRAM(s) Oral daily  fentaNYL   Infusion. 0.501 MICROgram(s)/kG/Hr (4.17 mL/Hr) IV Continuous <Continuous>  glucagon  Injectable 1 milliGRAM(s) IntraMuscular once  insulin glargine Injectable (LANTUS) 34 Unit(s) SubCutaneous at bedtime  insulin lispro (ADMELOG) corrective regimen sliding scale   SubCutaneous every 6 hours  insulin lispro Injectable (ADMELOG) 8 Unit(s) SubCutaneous every 6 hours  meropenem  IVPB      metoprolol tartrate 25 milliGRAM(s) Oral two times a day  pantoprazole  Injectable 40 milliGRAM(s) IV Push two times a day  senna 2 Tablet(s) Oral at bedtime  vancomycin  IVPB 1250 milliGRAM(s) IV Intermittent every 24 hours    MEDICATIONS  (PRN):  acetaminophen     Tablet .. 650 milliGRAM(s) Oral every 6 hours PRN Temp greater or equal to 38C (100.4F), Mild Pain (1 - 3)  aluminum hydroxide/magnesium hydroxide/simethicone Suspension 30 milliLiter(s) Oral every 4 hours PRN Dyspepsia  dextrose Oral Gel 15 Gram(s) Oral once PRN Blood Glucose LESS THAN 70 milliGRAM(s)/deciliter  guaiFENesin Oral Liquid (Sugar-Free) 200 milliGRAM(s) Oral every 6 hours PRN Cough  melatonin 5 milliGRAM(s) Oral at bedtime PRN Insomnia  midodrine. 5 milliGRAM(s) Oral three times a day PRN Hypotension  ondansetron Injectable 4 milliGRAM(s) IV Push every 8 hours PRN Nausea and/or Vomiting      PRESENT SYMPTOMS: [X ]Unable to obtain due to poor mentation   Source if other than patient:  [ ]Family   [ ]Team   [ X]All review of systems negative including pain and dyspnea unless noted below    All components of pain assessment below addressed. Blank spaces indicate that the patient did/could not complete the assessment.  Pain: [ ]yes [ ]no  QOL impact -   Location -                    Aggravating factors -  Quality -  Radiation -  Timing-  Severity (0-10 scale):  Minimal acceptable level (0-10 scale):     CPOT:    https://www.sccm.org/getattachment/gbg58w23-2g4f-3y4o-5g3s-3108j0755b3e/Critical-Care-Pain-Observation-Tool-(CPOT)    PAIN AD Score: 0  [email or web address]/painad.pdf (press ctrl +  left click to view)    Dyspnea:                           [ ]None[ ]Mild [ ]Moderate [ ]Severe     Respiratory Distress Observation Scale (RDOS): 2    A score of 0 to 2 signifies little or no respiratory distress, 3 signifies mild distress, scores 4 to 6 indicate moderate distress, and scores greater than 7 signify severe distress  [email or web address]/[email or web address]/files/[email or web address]-unxunmxusqs-iqfcqhxv-btnczkivdfn-[email or web address]    Anxiety:                             [ ]None[ ]Mild [ ]Moderate [ ]Severe   Fatigue:                             [ ]None[ ]Mild [ ]Moderate [ ]Severe   Nausea:                             [ ]None[ ]Mild [ ]Moderate [ ]Severe   Loss of appetite:              [ ]None[ ]Mild [ ]Moderate [ ]Severe   Constipation:                    [ ]None[ ]Mild [ ]Moderate [ ]Severe    Other Symptoms:    PHYSICAL EXAM:  Vital Signs Last 24 Hrs  T(C): 37.7 (27 Jun 2025 07:00), Max: 38.6 (26 Jun 2025 23:00)  T(F): 99.8 (27 Jun 2025 07:00), Max: 101.4 (26 Jun 2025 23:00)  HR: 96 (27 Jun 2025 06:00) (85 - 105)  BP: 117/59 (27 Jun 2025 07:00) (117/59 - 154/70)  BP(mean): 84 (27 Jun 2025 07:00) (84 - 103)  RR: 28 (27 Jun 2025 07:00) (20 - 35)  SpO2: 100% (27 Jun 2025 07:00) (98% - 100%)    Parameters below as of 27 Jun 2025 06:00  Patient On (Oxygen Delivery Method): ventilator        GENERAL:  [X ] No acute distress [ ]Lethargic  [ ]Unarousable  [ ]Verbal  [ ]Non-Verbal [ ]Cachexia    BEHAVIORAL/PSYCH:  [ ]Alert and Oriented x  [ ] Anxiety [ ] Delirium [ ] Agitation [X ] Calm   EYES: [ ] No scleral icterus [ ] Scleral icterus [ ] Closed  ENMT:  [ ]Dry mouth  [ ]No external oral lesions [ X] No external ear or nose lesions  CARDIOVASCULAR:  [ ]Regular [ ]Irregular [ ]Tachy [ ]Not Tachy  [ ]Aubrey [ ] Edema [ ] No edema  PULMONARY:  [ ]Tachypnea  [ ]Audible excessive secretions [X ] No labored breathing [ ] labored breathing  GASTROINTESTINAL: [ ]Soft  [ ]Distended  [ X]Not distended [ ]Non tender [ ]Tender  MUSCULOSKELETAL: [ ]No clubbing [ ] clubbing  [ X] No cyanosis [ ] cyanosis  NEUROLOGIC: [ ]No focal deficits  [ ]Follows commands  [ ]Does not follow commands  [ ]Cognitive impairment  [ ]Dysphagia  [ ]Dysarthria  [ ]Paresis   SKIN: [ ] Jaundiced [X ] Non-jaundiced [ ]Rash [ ]No Rash [ ] Warm [ ] Dry  MISC/LINES: [ ] ET tube [ ] Trach [ ]NGT/OGT [ ]PEG [ ]Mcmahon    CRITICAL CARE:  [ ] Shock Present  [ ]Septic [ ]Cardiogenic [ ]Neurologic [ ]Hypovolemic  [ ]  Vasopressors [ ]  Inotropes   [ ]Respiratory failure present [ ]Mechanical ventilation [ ]Non-invasive ventilatory support [ ]High flow  [ ]Acute  [ ]Chronic [ ]Hypoxic  [ ]Hypercarbic [ ]Other  [ ]Other organ failure     LABS: reviewed by me, notable for: elevated WBC, Na; Ca WNL                        8.2    24.15 )-----------( 292      ( 27 Jun 2025 06:06 )             28.6     06-27    146  |  108  |  82[HH]  ----------------------------<  266[H]  4.6   |  27  |  1.4    Ca    8.6      27 Jun 2025 06:06  Mg     2.6     06-27          RADIOLOGY & ADDITIONAL STUDIES: CXR personally reviewed by me: 6/25: b/l opacities    PROTEIN CALORIE MALNUTRITION PRESENT: [ ]mild [ ]moderate [ ]severe [ ]underweight [ ]morbid obesity  https://www.andeal.org/vault/2440/web/files/ONC/Table_Clinical%20Characteristics%20to%20Document%20Malnutrition-White%20JV%20et%20al%202012.pdf    Height (cm): 160 (06-14-25 @ 08:00), 162.6 (06-03-25 @ 14:33), 160 (05-30-25 @ 11:11)  Weight (kg): 83.3 (06-14-25 @ 08:00), 75.9 (06-03-25 @ 14:33), 75.7 (05-30-25 @ 11:11)  BMI (kg/m2): 32.5 (06-14-25 @ 08:00), 28.7 (06-03-25 @ 14:33), 29.6 (05-30-25 @ 11:11)    [ ]PPSV2 < or = to 30% [ ]significant weight loss  [ ]poor nutritional intake  [ ]anasarca      [ ]Artificial Nutrition          Palliative Care Spiritual/Emotional Screening Tool Question  Severity (0-4):                    OR                    [X ] Unable to determine/NA  Score of 2 or greater indicates recommendation of Chaplaincy referral  Chaplaincy Referral: [ ] Yes [ ] Refused [ ] Following     Caregiver [place]:  [ ] Yes [ ] No    OR    [x ] Unable to determine. Will assess at later time if appropriate.  Social Work Referral [ ]  Patient and Family Centered Care Referral [ ]    Anticipatory Grief Present: [ ] Yes [ ] No    OR     [ x] Unable to determine. Will assess at later time if appropriate.  Social Work Referral [ ]  Patient and Family Centered Care Referral [ ]    REFERRALS:   [ ]Chaplaincy  [ ]Hospice  [ ]Child Life  [ ]Social Work  [ ]Case management [ ]Holistic Therapy     Palliative care education provided to patient and/or family    Goals of Care Document:     ______________  Wolfgang Ramos MD  Palliative Medicine  [facility]   of Geriatric and Palliative Medicine  [phone]

## 2025-06-27 NOTE — PROGRESS NOTE ADULT - SUBJECTIVE AND OBJECTIVE BOX
Patient is a 70y old  Male who presents with a chief complaint of SOB (27 Jun 2025 07:36)        Over Night Events: No acute events noted         ROS:     All ROS are negative except HPI         PHYSICAL EXAM    ICU Vital Signs Last 24 Hrs  T(C): 37.7 (27 Jun 2025 07:00), Max: 38.6 (26 Jun 2025 23:00)  T(F): 99.8 (27 Jun 2025 07:00), Max: 101.4 (26 Jun 2025 23:00)  HR: 96 (27 Jun 2025 06:00) (82 - 105)  BP: 117/59 (27 Jun 2025 07:00) (117/59 - 163/74)  BP(mean): 84 (27 Jun 2025 07:00) (84 - 109)  ABP: --  ABP(mean): --  RR: 28 (27 Jun 2025 07:00) (20 - 35)  SpO2: 100% (27 Jun 2025 07:00) (98% - 100%)    O2 Parameters below as of 27 Jun 2025 06:00  Patient On (Oxygen Delivery Method): ventilator      CONSTITUTIONAL:  NAD    ENT:   Airway patent,   ET    EYES:   Pupils equal,   Round and reactive to light.    CARDIAC:   Normal rate,   Regular rhythm.      Vascular:  Normal systolic impulse  No Carotid bruits    RESPIRATORY:   No wheezing  Bilateral BS    GASTROINTESTINAL:  Abdomen soft,   Non-tender,   No guarding,   + BS    MUSCULOSKELETAL:   No clubbing, cyanosis    NEUROLOGICAL:   Intubated   No motor  deficits.    SKIN:   Skin normal color for race,   Warm and dry and intact.   No evidence of rash.    06-26-25 @ 07:01  -  06-27-25 @ 07:00  --------------------------------------------------------  IN:    dextrose 5%: 1875 mL    FentaNYL: 367.2 mL    Free Water: 1250 mL    Peptamen A.F.: 1168 mL  Total IN: 4660.2 mL    OUT:    Indwelling Catheter - Urethral (mL): 1735 mL  Total OUT: 1735 mL    Total NET: 2925.2 mL          LABS:                            8.2    24.15 )-----------( 292      ( 27 Jun 2025 06:06 )             28.6                                               06-26    147[H]  |  111[H]  |  77[HH]  ----------------------------<  229[H]  4.6   |  27  |  1.4    Ca    8.5      26 Jun 2025 21:38  Mg     2.6     06-26    TPro  5.2[L]  /  Alb  2.9[L]  /  TBili  <0.2  /  DBili  x   /  AST  23  /  ALT  27  /  AlkPhos  55  06-26                                             Urinalysis Basic - ( 26 Jun 2025 21:38 )    Color: x / Appearance: x / SG: x / pH: x  Gluc: 229 mg/dL / Ketone: x  / Bili: x / Urobili: x   Blood: x / Protein: x / Nitrite: x   Leuk Esterase: x / RBC: x / WBC x   Sq Epi: x / Non Sq Epi: x / Bacteria: x                                                  LIVER FUNCTIONS - ( 26 Jun 2025 05:54 )  Alb: 2.9 g/dL / Pro: 5.2 g/dL / ALK PHOS: 55 U/L / ALT: 27 U/L / AST: 23 U/L / GGT: x                                                  Culture - Acid Fast - Sputum w/Smear (collected 24 Jun 2025 09:00)  Source: Trach Asp Tracheal Aspirate  Preliminary Report (25 Jun 2025 23:07):    Culture is being performed.                                                   Mode: AC/ CMV (Assist Control/ Continuous Mandatory Ventilation)  RR (machine): 26  TV (machine): 440  FiO2: 40  PEEP: 8  ITime: 0.8  MAP: 12  PIP: 20                                      ABG - ( 27 Jun 2025 06:23 )  pH, Arterial: 7.44  pH, Blood: x     /  pCO2: 42    /  pO2: 105   / HCO3: 28    / Base Excess: 3.9   /  SaO2: 99.7                MEDICATIONS  (STANDING):  albuterol/ipratropium for Nebulization 3 milliLiter(s) Nebulizer every 6 hours  aMIOdarone    Tablet 200 milliGRAM(s) Oral daily  apixaban 5 milliGRAM(s) Enteral Tube every 12 hours  atorvastatin 40 milliGRAM(s) Oral at bedtime  chlorhexidine 0.12% Liquid 15 milliLiter(s) Oral Mucosa every 12 hours  chlorhexidine 2% Cloths 1 Application(s) Topical <User Schedule>  clopidogrel Tablet 75 milliGRAM(s) Enteral Tube daily  dextrose 5%. 1000 milliLiter(s) (75 mL/Hr) IV Continuous <Continuous>  dextrose 5%. 1000 milliLiter(s) (100 mL/Hr) IV Continuous <Continuous>  dextrose 5%. 1000 milliLiter(s) (50 mL/Hr) IV Continuous <Continuous>  dextrose 50% Injectable 25 Gram(s) IV Push once  dextrose 50% Injectable 12.5 Gram(s) IV Push once  dextrose 50% Injectable 25 Gram(s) IV Push once  ezetimibe 10 milliGRAM(s) Oral daily  fentaNYL   Infusion. 0.501 MICROgram(s)/kG/Hr (4.17 mL/Hr) IV Continuous <Continuous>  glucagon  Injectable 1 milliGRAM(s) IntraMuscular once  insulin glargine Injectable (LANTUS) 34 Unit(s) SubCutaneous at bedtime  insulin lispro (ADMELOG) corrective regimen sliding scale   SubCutaneous every 6 hours  insulin lispro Injectable (ADMELOG) 8 Unit(s) SubCutaneous every 6 hours  metoprolol tartrate 25 milliGRAM(s) Oral two times a day  pantoprazole  Injectable 40 milliGRAM(s) IV Push two times a day  senna 2 Tablet(s) Oral at bedtime    MEDICATIONS  (PRN):  acetaminophen     Tablet .. 650 milliGRAM(s) Oral every 6 hours PRN Temp greater or equal to 38C (100.4F), Mild Pain (1 - 3)  aluminum hydroxide/magnesium hydroxide/simethicone Suspension 30 milliLiter(s) Oral every 4 hours PRN Dyspepsia  dextrose Oral Gel 15 Gram(s) Oral once PRN Blood Glucose LESS THAN 70 milliGRAM(s)/deciliter  guaiFENesin Oral Liquid (Sugar-Free) 200 milliGRAM(s) Oral every 6 hours PRN Cough  melatonin 5 milliGRAM(s) Oral at bedtime PRN Insomnia  midodrine. 5 milliGRAM(s) Oral three times a day PRN Hypotension  ondansetron Injectable 4 milliGRAM(s) IV Push every 8 hours PRN Nausea and/or Vomiting      New X-rays reviewed:                                                                                  ECHO         Patient is a 70y old  Male who presents with a chief complaint of SOB (27 Jun 2025 07:36)        Over Night Events: No acute events noted   more calm   on vent   spiking temp   off precedex       ROS:     All ROS are negative except HPI         PHYSICAL EXAM    ICU Vital Signs Last 24 Hrs  T(C): 37.7 (27 Jun 2025 07:00), Max: 38.6 (26 Jun 2025 23:00)  T(F): 99.8 (27 Jun 2025 07:00), Max: 101.4 (26 Jun 2025 23:00)  HR: 96 (27 Jun 2025 06:00) (82 - 105)  BP: 117/59 (27 Jun 2025 07:00) (117/59 - 163/74)  BP(mean): 84 (27 Jun 2025 07:00) (84 - 109)  ABP: --  ABP(mean): --  RR: 28 (27 Jun 2025 07:00) (20 - 35)  SpO2: 100% (27 Jun 2025 07:00) (98% - 100%)    O2 Parameters below as of 27 Jun 2025 06:00  Patient On (Oxygen Delivery Method): ventilator      CONSTITUTIONAL:  NAD    ENT:   Airway patent,   ET    EYES:   Pupils equal,   Round and reactive to light.    CARDIAC:   Normal rate,   Regular rhythm.      Vascular:  Normal systolic impulse  No Carotid bruits    RESPIRATORY:   No wheezing  Bilateral BS    GASTROINTESTINAL:  Abdomen soft,   Non-tender,   No guarding,   + BS    MUSCULOSKELETAL:   No clubbing, cyanosis    NEUROLOGICAL:   Intubated   No motor  deficits.    SKIN:   Skin normal color for race,   Warm and dry and intact.   No evidence of rash.    06-26-25 @ 07:01  -  06-27-25 @ 07:00  --------------------------------------------------------  IN:    dextrose 5%: 1875 mL    FentaNYL: 367.2 mL    Free Water: 1250 mL    Peptamen A.F.: 1168 mL  Total IN: 4660.2 mL    OUT:    Indwelling Catheter - Urethral (mL): 1735 mL  Total OUT: 1735 mL    Total NET: 2925.2 mL          LABS:                            8.2    24.15 )-----------( 292      ( 27 Jun 2025 06:06 )             28.6                                               06-26    147[H]  |  111[H]  |  77[HH]  ----------------------------<  229[H]  4.6   |  27  |  1.4    Ca    8.5      26 Jun 2025 21:38  Mg     2.6     06-26    TPro  5.2[L]  /  Alb  2.9[L]  /  TBili  <0.2  /  DBili  x   /  AST  23  /  ALT  27  /  AlkPhos  55  06-26                                             Urinalysis Basic - ( 26 Jun 2025 21:38 )    Color: x / Appearance: x / SG: x / pH: x  Gluc: 229 mg/dL / Ketone: x  / Bili: x / Urobili: x   Blood: x / Protein: x / Nitrite: x   Leuk Esterase: x / RBC: x / WBC x   Sq Epi: x / Non Sq Epi: x / Bacteria: x                                                  LIVER FUNCTIONS - ( 26 Jun 2025 05:54 )  Alb: 2.9 g/dL / Pro: 5.2 g/dL / ALK PHOS: 55 U/L / ALT: 27 U/L / AST: 23 U/L / GGT: x                                                  Culture - Acid Fast - Sputum w/Smear (collected 24 Jun 2025 09:00)  Source: Trach Asp Tracheal Aspirate  Preliminary Report (25 Jun 2025 23:07):    Culture is being performed.                                                   Mode: AC/ CMV (Assist Control/ Continuous Mandatory Ventilation)  RR (machine): 26  TV (machine): 440  FiO2: 40  PEEP: 8  ITime: 0.8  MAP: 12  PIP: 20                                      ABG - ( 27 Jun 2025 06:23 )  pH, Arterial: 7.44  pH, Blood: x     /  pCO2: 42    /  pO2: 105   / HCO3: 28    / Base Excess: 3.9   /  SaO2: 99.7                MEDICATIONS  (STANDING):  albuterol/ipratropium for Nebulization 3 milliLiter(s) Nebulizer every 6 hours  aMIOdarone    Tablet 200 milliGRAM(s) Oral daily  apixaban 5 milliGRAM(s) Enteral Tube every 12 hours  atorvastatin 40 milliGRAM(s) Oral at bedtime  chlorhexidine 0.12% Liquid 15 milliLiter(s) Oral Mucosa every 12 hours  chlorhexidine 2% Cloths 1 Application(s) Topical <User Schedule>  clopidogrel Tablet 75 milliGRAM(s) Enteral Tube daily  dextrose 5%. 1000 milliLiter(s) (75 mL/Hr) IV Continuous <Continuous>  dextrose 5%. 1000 milliLiter(s) (100 mL/Hr) IV Continuous <Continuous>  dextrose 5%. 1000 milliLiter(s) (50 mL/Hr) IV Continuous <Continuous>  dextrose 50% Injectable 25 Gram(s) IV Push once  dextrose 50% Injectable 12.5 Gram(s) IV Push once  dextrose 50% Injectable 25 Gram(s) IV Push once  ezetimibe 10 milliGRAM(s) Oral daily  fentaNYL   Infusion. 0.501 MICROgram(s)/kG/Hr (4.17 mL/Hr) IV Continuous <Continuous>  glucagon  Injectable 1 milliGRAM(s) IntraMuscular once  insulin glargine Injectable (LANTUS) 34 Unit(s) SubCutaneous at bedtime  insulin lispro (ADMELOG) corrective regimen sliding scale   SubCutaneous every 6 hours  insulin lispro Injectable (ADMELOG) 8 Unit(s) SubCutaneous every 6 hours  metoprolol tartrate 25 milliGRAM(s) Oral two times a day  pantoprazole  Injectable 40 milliGRAM(s) IV Push two times a day  senna 2 Tablet(s) Oral at bedtime    MEDICATIONS  (PRN):  acetaminophen     Tablet .. 650 milliGRAM(s) Oral every 6 hours PRN Temp greater or equal to 38C (100.4F), Mild Pain (1 - 3)  aluminum hydroxide/magnesium hydroxide/simethicone Suspension 30 milliLiter(s) Oral every 4 hours PRN Dyspepsia  dextrose Oral Gel 15 Gram(s) Oral once PRN Blood Glucose LESS THAN 70 milliGRAM(s)/deciliter  guaiFENesin Oral Liquid (Sugar-Free) 200 milliGRAM(s) Oral every 6 hours PRN Cough  melatonin 5 milliGRAM(s) Oral at bedtime PRN Insomnia  midodrine. 5 milliGRAM(s) Oral three times a day PRN Hypotension  ondansetron Injectable 4 milliGRAM(s) IV Push every 8 hours PRN Nausea and/or Vomiting      New X-rays reviewed:                                                                                  ECHO    b/l effusion and opacity

## 2025-06-27 NOTE — PROGRESS NOTE ADULT - SUBJECTIVE AND OBJECTIVE BOX
24H events:    Patient is a 70y old Male who presents with a chief complaint of SOB (27 Jun 2025 08:53)    Primary diagnosis of CHF exacerbation    Today is hospital day 13d. This morning patient was seen and examined at bedside, resting comfortably in bed.    No acute or major events overnight.    Code Status:    Family communication:  Contact date:  Name of person contacted:  Relationship to patient:  Communication details:  What matters most:    PAST MEDICAL & SURGICAL HISTORY  CHF (congestive heart failure)    Diabetes    CAD (coronary artery disease)    Chronic obstructive pulmonary disease (COPD)    HTN (hypertension)    Stented coronary artery    Dyslipidemia    GERD (gastroesophageal reflux disease)    Afib    CVA (cerebral vascular accident)    H/O heart artery stent    Heart valve replaced  aortic    History of Nissen fundoplication      SOCIAL HISTORY:  Social History:      ALLERGIES:  Bactrim (Unknown)  sulfa drugs (Unknown)    MEDICATIONS:  STANDING MEDICATIONS  albuterol/ipratropium for Nebulization 3 milliLiter(s) Nebulizer every 6 hours  aMIOdarone    Tablet 200 milliGRAM(s) Oral daily  apixaban 5 milliGRAM(s) Enteral Tube every 12 hours  atorvastatin 40 milliGRAM(s) Oral at bedtime  chlorhexidine 0.12% Liquid 15 milliLiter(s) Oral Mucosa every 12 hours  chlorhexidine 2% Cloths 1 Application(s) Topical <User Schedule>  clopidogrel Tablet 75 milliGRAM(s) Enteral Tube daily  dextrose 5%. 1000 milliLiter(s) IV Continuous <Continuous>  dextrose 5%. 1000 milliLiter(s) IV Continuous <Continuous>  dextrose 50% Injectable 25 Gram(s) IV Push once  dextrose 50% Injectable 12.5 Gram(s) IV Push once  dextrose 50% Injectable 25 Gram(s) IV Push once  ezetimibe 10 milliGRAM(s) Oral daily  fentaNYL   Infusion. 0.501 MICROgram(s)/kG/Hr IV Continuous <Continuous>  glucagon  Injectable 1 milliGRAM(s) IntraMuscular once  insulin glargine Injectable (LANTUS) 34 Unit(s) SubCutaneous at bedtime  insulin lispro (ADMELOG) corrective regimen sliding scale   SubCutaneous every 6 hours  insulin lispro Injectable (ADMELOG) 8 Unit(s) SubCutaneous every 6 hours  metoprolol tartrate 25 milliGRAM(s) Oral two times a day  pantoprazole  Injectable 40 milliGRAM(s) IV Push two times a day  senna 2 Tablet(s) Oral at bedtime    PRN MEDICATIONS  acetaminophen     Tablet .. 650 milliGRAM(s) Oral every 6 hours PRN  aluminum hydroxide/magnesium hydroxide/simethicone Suspension 30 milliLiter(s) Oral every 4 hours PRN  dextrose Oral Gel 15 Gram(s) Oral once PRN  guaiFENesin Oral Liquid (Sugar-Free) 200 milliGRAM(s) Oral every 6 hours PRN  melatonin 5 milliGRAM(s) Oral at bedtime PRN  midodrine. 5 milliGRAM(s) Oral three times a day PRN  ondansetron Injectable 4 milliGRAM(s) IV Push every 8 hours PRN    VITALS:   T(F): 99.8  HR: 96  BP: 117/59  RR: 28  SpO2: 100%    PHYSICAL EXAM:      General: No apparent distress  Cardiovascular: S1, S2  Gastrointestinal: Soft, Non-tender, Non-distended  Respiratory: vented  Neurologic: sedated  Dermatologic: Skin dry      (  ) Indwelling Mcmahon Catheter:   Date insterted:    Reason (  ) Critical illness     (  ) urinary retention    (  ) Accurate Ins/Outs Monitoring     (  ) CMO patient    (  ) Central Line:   Date inserted:  Location: (  ) Right IJ     (  ) Left IJ     (  ) Right Fem     (  ) Left Fem    (  ) SPC        (  ) pigtail       (  ) PEG tube       (  ) colostomy       (  ) jejunostomy  (  ) U-Dall    LABS:                        8.2    24.15 )-----------( 292      ( 27 Jun 2025 06:06 )             28.6     06-27    146  |  108  |  82[HH]  ----------------------------<  266[H]  4.6   |  27  |  1.4    Ca    8.6      27 Jun 2025 06:06  Mg     2.6     06-27    TPro  5.1[L]  /  Alb  2.7[L]  /  TBili  <0.2  /  DBili  x   /  AST  31  /  ALT  25  /  AlkPhos  57  06-27      Urinalysis Basic - ( 27 Jun 2025 06:06 )    Color: x / Appearance: x / SG: x / pH: x  Gluc: 266 mg/dL / Ketone: x  / Bili: x / Urobili: x   Blood: x / Protein: x / Nitrite: x   Leuk Esterase: x / RBC: x / WBC x   Sq Epi: x / Non Sq Epi: x / Bacteria: x      ABG - ( 27 Jun 2025 06:23 )  pH, Arterial: 7.44  pH, Blood: x     /  pCO2: 42    /  pO2: 105   / HCO3: 28    / Base Excess: 3.9   /  SaO2: 99.7                      RADIOLOGY:

## 2025-06-27 NOTE — PROGRESS NOTE ADULT - CONVERSATION DETAILS
Introduced role of palliative care to sons Neto and Jae. They are aware he is intubated and are trying to wean him off, and that he is being treated for PNA. Family states late last year, when he was hospitalized, he has had frequent hospitalizations (eight times in 2025), which they attribute partially to being nonadherent to medications, loss of mobility, O2-dependent, and has also has become "slightly forgetful." Lives with his wife and another brother; states that patient ambulates with walker and cane at home.     Sons feel that his quality of life, after these recent hospitalizations, has been "all right." They are open to ELIZABETH if he needed it, but states that he "hates" it, but understand he needs it. They also feel medication nonadherence is a key factor in frequent hospitalizations; stressed importance of encouraging adherence to help prevent future rehospitalization    Also discussed trajectory of HFrEF, and that hospitalizations may increase over time. Discussed hospice, in terms of philosophy of care, which they understood.     Discussed ACP. Son states patient made decision for DNR/DNI due to his "agitation factor," but has indicated that he wants to live. As of now, they are open to full code, full medical measures.    > 16 minutes spent
Discussed with son re: trach vs palliative extubation, given length of intubation since admission. Son is unsure if patient would want trach and vent SNF placement if chance of recovery is low, and explained that while he may eventually wean off vent in SNF, chances are likely lower given age and underlying CHF and COPD. Son needs some time to think.
Discussed with wife, who is aware of current medical situation. She is aware that patient wanted DNR/DNI. She realizes these would be his choice, but she is not open to changing her wishes for him at this time. She is aware that, if he regains the ability to make this decision again, we should respect autonomy.
Lengthy discussion held with patient's son Neto. Discussed trach, as a follow-up to last week's discussion, and he states his mother and family are leaning towards trach. DIscussed overall poor prognosis, and even if he were to survive this admission, chances for readmissions and steady decline is highly likely given HFrEF and COPD, alongside chronic critical illness. Son understood, and admitted today that he feels his father would have said "to hell with it" in his current state and not pursued ongoing life sustaining treatment, but also states his mother and family are holding onto a "sliver of hope" that he may have some chance of recovery (understanding it would highly likely not be back to baseline) and may only give them a few more months of time with him. I encouraged him to have ongoing discussions with his mother and brother regarding honoring patient's wishes, as expressed on his MOLST indicating DNR/DNI, and prioritizing that over their wishes for his continued life-sustaining treatment if it would not be within his GOC and his wishes for quality of life.     25 mins spent

## 2025-06-27 NOTE — PROGRESS NOTE ADULT - ASSESSMENT
This is a 70 year old male with past medical history of  COPD (on 2L home O2), DM 2  HFrEF 30%, CAD,  ischemic cardiomyopathy s/p CRT-D, HTN, paroxysmal A-fib (status post ablation 1/14/25) on amiodarone and  Eliquis who presented to ED w c/o LESLIE and b/l  leg swelling.    IMPRESSION  #Chronic Leukocytosis- Now Worsened- Possible leukemoid reaction  #Acute on chronic hypoxic respiratory failure  #Heart failure with reduced EF, Acute on chronic exacerbation  #Large Bilateral pleural effusions/Pulmonary edema- Improved on recent CT chest  #Anemia- Possible ischemic in nature resulting in loose stools  #CT abdomen noted for colitis- Suspect Ischemic colitis.   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  #Obesity BMI (kg/m2): 32.5, 28.7, 29.6, 30.1    Recently Enterovirus Positive Discharged on PO steroids Vantin+Doxy    Covid/Flu/RSV negative  MRSA nares positive  , Fungitell 108 (very mildly elevated)   Blood cultures NGTD  Sputum cultures Normal Respiratory magda  C.diff negative     RECOMMENDATIONS  -Unclear cause of fevers- Likely drug induced.  -Monitor for fever curve. Trend WBC  -Follow up with repeat blood cultures. Check sputum cultures if possible.   -Cardiology follow up.  -Monitor off abx since 6/23/2025.   -Off loading to prevent pressure sores and preventive measures to avoid aspiration. TOV. GOC. Recurrent admissions expected.     If any questions, please send a message or call on [employer] Teams  Please continue to update ID with any pertinent new laboratory or radiographic findings.    Benigno Pond M.D  Infectious Diseases Attending/   [employer] at [facility]   This is a 70 year old male with past medical history of  COPD (on 2L home O2), DM 2  HFrEF 30%, CAD,  ischemic cardiomyopathy s/p CRT-D, HTN, paroxysmal A-fib (status post ablation 1/14/25) on amiodarone and  Eliquis who presented to ED w c/o LESLIE and b/l  leg swelling.    IMPRESSION  #Chronic Leukocytosis- Now Worsened- Possible leukemoid reaction  #Acute on chronic hypoxic respiratory failure  #Heart failure with reduced EF, Acute on chronic exacerbation  #Large Bilateral pleural effusions/Pulmonary edema- Improved on recent CT chest  #Anemia- Possible ischemic in nature resulting in loose stools  #CT abdomen noted for colitis- Suspect Ischemic colitis.   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  #Obesity BMI (kg/m2): 32.5, 28.7, 29.6, 30.1    Recently Enterovirus Positive Discharged on PO steroids Vantin+Doxy    Covid/Flu/RSV negative  MRSA nares positive  , Fungitell 108 (very mildly elevated)   Blood cultures NGTD  Sputum cultures Normal Respiratory magda  C.diff negative     RECOMMENDATIONS  -Finished broad spectrum abx 6/23  -Unclear cause of fevers- Likely drug induced vs pleural effusions.  -Monitor for fever curve. Trend WBC  -Follow up with repeat blood cultures.   -S/p BAL 6/27- Thick copious secretions from BL lung fields reported  -Initiated empirically on abx. IV Vanc dosing as per pharmacy protocol.  -IV Meropenem 1000mg q 8 hours (S/D 6/27)   -Cardiology follow up.  -Off loading to prevent pressure sores and preventive measures to avoid aspiration. TOV. GOC. Recurrent admissions expected.     If any questions, please send a message or call on [employer] Teams  Please continue to update ID with any pertinent new laboratory or radiographic findings.    Benigno Pond M.D  Infectious Diseases Attending/   [employer] at [facility]   This is a 70 year old male with past medical history of  COPD (on 2L home O2), DM 2  HFrEF 30%, CAD,  ischemic cardiomyopathy s/p CRT-D, HTN, paroxysmal A-fib (status post ablation 1/14/25) on amiodarone and  Eliquis who presented to ED w c/o LESLIE and b/l  leg swelling.    IMPRESSION  #Chronic Leukocytosis- Now Worsened- Possible leukemoid reaction  #Acute on chronic hypoxic respiratory failure  #Heart failure with reduced EF, Acute on chronic exacerbation  #Large Bilateral pleural effusions/Pulmonary edema- Improved on recent CT chest  #Anemia- Possible ischemic in nature resulting in loose stools  #CT abdomen noted for colitis- Suspect Ischemic colitis.   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  #Obesity BMI (kg/m2): 32.5, 28.7, 29.6, 30.1    Recently Enterovirus Positive Discharged on PO steroids Vantin+Doxy    Covid/Flu/RSV negative  MRSA nares positive  , Fungitell 108 (very mildly elevated)   Blood cultures NGTD  Sputum cultures Normal Respiratory magda  C.diff negative     RECOMMENDATIONS  -Finished broad spectrum abx 6/23  -Unclear cause of fevers- Likely drug induced vs pleural effusions.  -Monitor for fever curve. Trend WBC  -Follow up with repeat blood cultures.   -S/p BAL 6/27- Thick copious secretions from BL lung fields reported  -Initiated empirically on abx. IV Vanc dosing as per pharmacy protocol.  -IV Meropenem 1000mg q 12 hours (S/D 6/27)   -Cardiology follow up.  -Off loading to prevent pressure sores and preventive measures to avoid aspiration. TOV. GOC. Recurrent admissions expected.     If any questions, please send a message or call on [employer] Teams  Please continue to update ID with any pertinent new laboratory or radiographic findings.    Benigno Pond M.D  Infectious Diseases Attending/   [employer] at [facility]

## 2025-06-27 NOTE — CONSULT NOTE ADULT - SUBJECTIVE AND OBJECTIVE BOX
[facility]  Progress Note  Name: Val Amin  MRN: [id number]  Unit/Bed#: W -01 I Date of Admission: 10/10/2023   Date of Service: 10/11/2023 I Hospital Day: 1    Assessment/Plan   * Cellulitis of hand  Assessment & Plan  Antibiotic - Unasyn will be continued. Antipyretic - Tylenol PRN  Pain control -   Tylenol for mild pain  Oxycodone for moderate or severe pain. Monitor pain levels. Overall appears improving swelling and pain. Made NPO post MN by hand surgery as precaution for re-eval in Am. F/U Definitive plan by hand surgery   Serial hand exams. Elevate hand to help with edema. Cat bite  Assessment & Plan  Treat cellulitis as above. Patient indicates the likelihood that she will be getting rid of the cat that bit her. Tetanus vaccine given. Multiple food allergies  Assessment & Plan  Diet for many years has been limited to [address] salmon, grits, and water. Put her on regular diet and told her to order what she knows she can take and can have food from home. Nausea  Assessment & Plan  Reports new onset nausea this afternoon with feeling of " acid in my mouth"  Will DC Toradol. Start PPI   Hgb 10.9 mildly anemic. Could be secondary to hemodilution will monitor hgb   Treat constipation. Added stool softeners                VTE Pharmacologic Prophylaxis: VTE Score: 3 Moderate Risk (Score 3-4) - Pharmacological DVT Prophylaxis Ordered: enoxaparin (Lovenox). Patient Centered Rounds: I performed bedside rounds with nursing staff today. Discussions with Specialists or Other Care Team Provider:     Education and Discussions with Family / Patient:     Current Length of Stay: 1 day(s)  Current Patient Status: Inpatient   Certification Statement: The patient will continue to require additional inpatient hospital stay due to close monitoring of hand cellulitis/on IV abx  Discharge Plan: Anticipate discharge in 24-48 hrs to home.     Code Status: Level 1 - Full Code    Subjective:   Pt reports improved swelling and pain in her right hand. She c/o nausea that started this afternoon. Reports constipation with last BM three days ago, somewhat chronic issue for her     Objective:     Vitals:   Temp (24hrs), Av.1 °F (36.7 °C), Min:97.8 °F (36.6 °C), Max:98.4 °F (36.9 °C)    Temp:  [97.8 °F (36.6 °C)-98.4 °F (36.9 °C)] 97.8 °F (36.6 °C)  HR:  [] 78  Resp:  [16-20] 18  BP: ()/(46-61) 80/46  SpO2:  [94 %-99 %] 96 %  Body mass index is 21.48 kg/m². Input and Output Summary (last 24 hours): Intake/Output Summary (Last 24 hours) at 10/11/2023 1607  Last data filed at 10/11/2023 1400  Gross per 24 hour   Intake 6283.34 ml   Output --   Net 6283.34 ml       Physical Exam:   Physical Exam  Constitutional:       General: She is not in acute distress. Appearance: She is not ill-appearing, toxic-appearing or diaphoretic. Eyes:      General:         Right eye: No discharge. Left eye: No discharge. Cardiovascular:      Rate and Rhythm: Normal rate and regular rhythm. Pulmonary:      Effort: Pulmonary effort is normal. No respiratory distress. Musculoskeletal:         General: Swelling present. Neurological:      Mental Status: She is oriented to person, place, and time.    Psychiatric:         Mood and Affect: Mood normal.         Behavior: Behavior normal.          Additional Data:     Labs:  Results from last 7 days   Lab Units 10/11/23  0604 10/10/23  1458   WBC Thousand/uL 11.73* 9.67   HEMOGLOBIN g/dL 10.9* 13.3   HEMATOCRIT % 33.0* 38.9   PLATELETS Thousands/uL 135* 161   BANDS PCT % 30*  --    NEUTROS PCT %  --  86*   LYMPHS PCT %  --  5*   LYMPHO PCT % 8*  --    MONOS PCT %  --  9   MONO PCT % 4  --    EOS PCT % 1 0     Results from last 7 days   Lab Units 10/11/23  0604 10/10/23  1458   SODIUM mmol/L 140 138   POTASSIUM mmol/L 3.5 3.4*   CHLORIDE mmol/L 111* 108   CO2 mmol/L 25 22   BUN mg/dL 17 26*   CREATININE mg/dL 0.77 0.80   ANION Consult requested by: Sujit Hdez MD   Role: Attending     Service: Medicine    Consultant: Mariza Cam, MSN, RN, ECHO, [facility]-BC, [facility]-C (Ethics Fellow) Contact #s: [phone] (cell); [phone]    CONSULT PURPOSE: To assist the healthcare team with the ethical dilemma of a 70-year-old male with a complex medical history who was admitted for acute hypoxic respiratory failure and exacerbation of CHF with a poor prognosis and unable to affirm his goals of care.    CLINICAL SUMMARY: This is the case of a 70-year-old male with past medical history of COPD (on 2L home O2), diabetes mellitus, heart failure with reduced ejection fraction (EF =30%), coronary artery disease, ischemic cardiomyopathy (status post cardiac resynchronization therapy with defibrillator), hypertension, paroxysmal A-fib (status post ablation 1/14/25; on Eliquis) who presented to the ED from home via EMS with complaints of dyspnea on exertion and bilateral leg swelling. In the ED, patient was initially placed on bipap and workup was concerning for WBC > 50, and chest x-ray with bilateral opacities/pleural effusions. While in ED, the patient became acutely hypoxic and later intubated for hypoxia and altered mentation. Of note, per chart review, patient was intubated over previous existing MOLST after discussions with the patient’s wife (see Critical Care note 6/14). Patient was subsequently admitted to MICU for acute hypoxic respiratory failure and CHF exacerbation. Cardiology, Infectious Disease, and Palliative Care on consult. Ethics consulted to facilitate optimization of communication with the team and the patient’s family regarding patient’s expressed goals of care.    Prognosis Estimate (survival in hrs, days, wks, mos, yrs): Poor    Patient Decision-Making Capacity: Lacks Capacity (intubated, sedated, and critically ill)    Patient Aware of: Diagnosis: Unknown Prognosis: Unknown    Name of medical decision-maker should patient lack capacity: Neto Frey (son, [phone])     Role: Legal Surrogate [see discussions and analysis]    Decision-Maker (i.e., HCA or Surrogate) Aware of: Diagnosis: Yes Prognosis: Yes    Other Stakeholders: Pat Frey (wife, [phone]; [phone])    Evidence of Patient’s Preference of Life-Sustaining Treatment (Written or Oral): MOLST (6/4/2025 - DNR/DNI with trial NIV)    Resuscitation status: DNR:            DNI:      Discussions:    Ethics consult initiated and chart reviewed on 6/27/2025.    Discussion with UBRTON Hdez MD (Attending) and EZEQUIEL Granados NP (Palliative) on 6/27/2025 (12:32-12:47). Discussed case in detail. Patient with poor prognosis and currently intubated and sedated. Patient unlikely to be weaned of ventilator nor improve to clinical state where he may be able to participate in complex care planning discussions. Discussed previous existing MOLST completed by the patient and pending decision by family regarding transitions of care for tracheostomy versus palliative extubation.    Discussion with MARGARET Ramos MD (Palliative Attending) on 6/27/2025 (12:47-12:48). Discussed case in detail. Palliative has been following case and has had conversations with both the patient’s wife and son. The patient’s wife has deferred treatment decisions to the patient’s son. Palliative spoke with son yesterday regarding decision for tracheostomy versus palliative extubation and son has asked for time to consider decision.    Discussion with Neto Frey (Patient’s son) on 6/27/2025 (12:58-12:59). Call went unanswered. Voicemail left to return call to Ethics.    Bioethics Analysis: The central ethical issue that exists in this case is respecting the patient’s autonomy.     As the patient’s family has expressed treatment decisions on behalf of an incapacitated patient that would conflict with the patient’s previously expressed wishes, the clinical team is faced with the dilemma on how to best to maintain the patient’s autonomy.    Respect for a person’s autonomy involves granting patients (who have the capacity to make care-related decisions) the opportunity to determine what is in their best interest as it pertains to their health and well-being (and in alignment with their values and belief), after being fully informed by the healthcare team regarding potential interventions, including the benefits of accepting, or risks of declining, these interventions.[1]    To preserve and empower their ability to make autonomous choices, some patients decide in advance what medical interventions they would and would not like to be imposed on them and may also identify an individual to act on their behalf (according to their previously expressed wishes) should they become incapacitated from making these decisions. These decisions are then memorialized in advance directives or medical orders. The purpose of these directives and orders is to protect patients’ autonomous choices regarding their medical care should they lose the capacity to make such decisions.    Presently, Mr. Neto Frey is currently incapacitated from making medical decisions in his present clinical state. When a patient lacks capacity to make medical decisions, the clinical team may rely upon advance directives or previously completed Medical Orders for Life-Sustaining Treatment (MOLST) to assist in guiding treatment decisions should such documents exist. Additionally, the team can rely upon a health care agent the patient had previously appointed to be their decision-maker (i.e., health care proxy) [2], or if no documentation health care poxy exists, a legal surrogate as defined by the [facility] Family Health Care Decisions Act [3], to assist with treatment decisions made in the best interest of the patient if the patient’s previous wishes were unknown. To protect the patient’s expression of autonomy, and to act validly under the law, the patient’s surrogate or appointed health care agent, must make decisions in accordance with the patient’s expressed medical orders as stated on the MOLST.    Here, Mr. Neto Frey has previously expressed (both verbally and in writing) his treatment decisions regarding life-sustaining treatments during a previous hospitalization (see [facility] notes 5/25/2025 and 6/4/2025). The patient has memorialized his autonomous wishes through completion of a MOLST indicating that he would not want cardiopulmonary resuscitation nor intubation but was amenable to a trial of non-invasive ventilation (i.e., DNR/DNI and a trial of NIV).    As necessitated by the [facility] Department of Health, MOLST forms should be periodically reviewed, i.e., at least every 90 days or if the patient is transferred to another care location, has a major change in health status (for better or worse), or the patient or other decision-maker changes their mind about treatment.[4] This review is essential to preserve the patient’s autonomy, as “the patient's medical condition, prognosis, values, wishes, and goals of care may change over time”[4] and any requested changes to the previously expressed preferences should be accurately reflected on the MOLST    In some cases, a surrogate may have a valid reason to override a patient’s prior decision. For instance, the surrogate might have proof that the patient did not understand the decision they were making. The surrogate might contend that the clinical circumstances are different now (major change in health status) and the patient would no longer wish their care to be in a certain way. More generally, a surrogate has broad latitude to interpret the patient’s wishes and apply them to actual decisions when there is room for such interpretation. However, the surrogate does not have is the authority to interpose their own wishes and values as a basis to override prior expressed wishes of a capable patient.[5] Such an act would constrain a patient’s autonomy and defeat a key purpose of the statute: to ensure that such wishes would be respected in the event of a loss of capacity.    In this case, this hospital admission occurred 10 days after the MOLST was completed. Notably, the patient was readmitted for similar symptoms related to his chronic and complex medical conditions, which he had exhibited during the last admission when the MOLST was completed, as well as in the multiple admissions the patient has had in the last six months. As such, there have been no significant changes to this patient’s health status that may warrant a review of the MOLST. Furthermore, there are no known indications that the patient has changed his values, wishes, or goals of care.    Complicating this case was the decision made to intubate the patient despite his previously expressed directions. However, while the action of intubation was not aligned with the patient’s autonomous decision, the MOLST still remains a valid and legal representation of the patient’s directions to clinicians of what life-sustaining treatment may be provided to him while he remains incapacitated and should be honored as discussions are held with the patient’s family members and surrogate decision maker.    Such conversations about treatment decisions at the end-of-life are emotionally fraught and difficult but are necessary to have with family members regarding their loved ones. The healthcare  team is applauded for creating an atmosphere of clarity in communication with the patient’s family regarding the current plan of care. It is important to recognize that beneficence extends to easing the stress of the patient’s family who may be struggling with grief over the patient’s current clinical state and poor prognosis and may need to be granted some anthony in time to accept the plan of care.    GAP mmol/L 4 8   CALCIUM mg/dL 7.6* 8.9   ALBUMIN g/dL  --  3.9   TOTAL BILIRUBIN mg/dL  --  0.57   ALK PHOS U/L  --  45   ALT U/L  --  69*   AST U/L  --  64*   GLUCOSE RANDOM mg/dL 84 95                 Results from last 7 days   Lab Units 10/10/23  1459 10/10/23  1458   LACTIC ACID mmol/L  --  0.8   PROCALCITONIN ng/ml 1.82*  --        Lines/Drains:  Invasive Devices       Peripheral Intravenous Line  Duration             Peripheral IV 10/10/23 Right Antecubital <1 day                          Imaging:     Recent Cultures (last 7 days):   Results from last 7 days   Lab Units 10/10/23  1458   BLOOD CULTURE  Received in Microbiology Lab. Culture in Progress. Received in Microbiology Lab. Culture in Progress. Last 24 Hours Medication List:   Current Facility-Administered Medications   Medication Dose Route Frequency Provider Last Rate    acetaminophen  975 mg Oral [facility] Amber Garcia MD      Albumin 25%  12.5 g Intravenous Once Camille Johnson MD      ampicillin-sulbactam  3 g Intravenous Q6H Bethena Rude, DO 3 g (10/11/23 1115)    enoxaparin  40 mg Subcutaneous Daily Bethena Rude, DO      ondansetron  4 mg Intravenous Q6H PRN Bethena Rude, DO      oxyCODONE  5 mg Oral Q4H PRN Bethena Rude, DO      oxyCODONE  2.5 mg Oral Q4H PRN Bethena Rude, DO      pantoprazole  40 mg Intravenous Q24H [address] Oanh Smith MD      polyethylene glycol  17 g Oral Daily Camille Johnson MD      senna-docusate sodium  1 tablet Oral BID PRN Camille Johnson MD          Today, Patient Was Seen By: Camille Johnson MD    **Please Note: This note may have been constructed using a voice recognition system. ** Consult requested by: Sujit Hdez MD   Role: Attending     Service: Medicine    Consultant: Mariza Cam, MSN, RN, ECHO, [facility]-BC, [facility]-C (Ethics Fellow) Contact #s: [phone] (cell); [phone]    CONSULT PURPOSE: To assist the healthcare team with the ethical dilemma of a 70-year-old male with a complex medical history who was admitted for acute hypoxic respiratory failure and exacerbation of CHF with a poor prognosis and unable to affirm his goals of care.    CLINICAL SUMMARY: This is the case of a 70-year-old male with past medical history of COPD (on 2L home O2), diabetes mellitus, heart failure with reduced ejection fraction (EF =30%), coronary artery disease, ischemic cardiomyopathy (status post cardiac resynchronization therapy with defibrillator), hypertension, paroxysmal A-fib (status post ablation 1/14/25; on Eliquis) who presented to the ED from home via EMS with complaints of dyspnea on exertion and bilateral leg swelling. In the ED, patient was initially placed on bipap and workup was concerning for WBC > 50, and chest x-ray with bilateral opacities/pleural effusions. While in ED, the patient became acutely hypoxic and later intubated for hypoxia and altered mentation. Of note, per chart review, patient was intubated over previous existing MOLST after discussions with the patient’s wife (see Critical Care note 6/14). Patient was subsequently admitted to MICU for acute hypoxic respiratory failure and CHF exacerbation. Cardiology, Infectious Disease, and Palliative Care on consult. Ethics consulted to facilitate optimization of communication with the team and the patient’s family regarding patient’s expressed goals of care.    Prognosis Estimate (survival in hrs, days, wks, mos, yrs): Poor    Patient Decision-Making Capacity: Lacks Capacity (intubated, sedated, and critically ill)    Patient Aware of: Diagnosis: Unknown Prognosis: Unknown    Name of medical decision-maker should patient lack capacity: Neto Frey (son, [phone])     Role: Legal Surrogate [see discussions and analysis]    Decision-Maker (i.e., HCA or Surrogate) Aware of: Diagnosis: Yes Prognosis: Yes    Other Stakeholders: Pat Frey (wife, [phone]; [phone])    Evidence of Patient’s Preference of Life-Sustaining Treatment (Written or Oral): MOLST (6/4/2025 - DNR/DNI with trial NIV)    Resuscitation status: DNR:            DNI:      Discussions:    Ethics consult initiated and chart reviewed on 6/27/2025.    Discussion with BURTON Hdez MD (Attending) and EZEQUIEL Granados NP (Palliative) on 6/27/2025 (12:32-12:47). Discussed case in detail. Patient with poor prognosis and currently intubated and sedated. Patient unlikely to be weaned of ventilator nor improve to clinical state where he may be able to participate in complex care planning discussions. Discussed previous existing MOLST completed by the patient and pending decision by family regarding transitions of care for tracheostomy versus palliative extubation.    Discussion with MARGARET Ramos MD (Palliative Attending) on 6/27/2025 (12:47-12:48). Discussed case in detail. Palliative has been following case and has had conversations with both the patient’s wife and son. The patient’s wife has deferred treatment decisions to the patient’s son. Palliative spoke with son yesterday regarding decision for tracheostomy versus palliative extubation and son has asked for time to consider decision.    Discussion with Neto Frey (Patient’s son) on 6/27/2025 (12:58-12:59). Call went unanswered. Voicemail left to return call to Ethics.    Bioethics Analysis: The central ethical issue that exists in this case is respecting the patient’s autonomy.     As the patient’s family has expressed treatment decisions on behalf of an incapacitated patient that would conflict with the patient’s previously expressed wishes, the clinical team is faced with the dilemma on how to best to maintain the patient’s autonomy.    Respect for a person’s autonomy involves granting patients (who have the capacity to make care-related decisions) the opportunity to determine what is in their best interest as it pertains to their health and well-being (and in alignment with their values and belief), after being fully informed by the healthcare team regarding potential interventions, including the benefits of accepting, or risks of declining, these interventions.[1]    To preserve and empower their ability to make autonomous choices, some patients decide in advance what medical interventions they would and would not like to be imposed on them and may also identify an individual to act on their behalf (according to their previously expressed wishes) should they become incapacitated from making these decisions. These decisions are then memorialized in advance directives or medical orders. The purpose of these directives and orders is to protect patients’ autonomous choices regarding their medical care should they lose the capacity to make such decisions.    Presently, Mr. Neto Frey is currently incapacitated from making medical decisions in his present clinical state. When a patient lacks capacity to make medical decisions, the clinical team may rely upon advance directives or previously completed Medical Orders for Life-Sustaining Treatment (MOLST) to assist in guiding treatment decisions should such documents exist. Additionally, the team can rely upon a health care agent the patient had previously appointed to be their decision-maker (i.e., health care proxy) [2], or if no documentation of a health care poxy exists, a legal surrogate as defined by the [facility] Family Health Care Decisions Act [3], to assist with treatment decisions made in the best interest of the patient if the patient’s previous wishes were unknown. To protect the patient’s expression of autonomy, and to act validly under the law, the patient’s surrogate or appointed health care agent, must make decisions in accordance with the patient’s expressed medical orders as stated on the MOLST.    Here, Mr. Neto Frey has previously expressed (both verbally and in writing) his treatment decisions regarding life-sustaining treatments during a previous hospitalization (see [facility] notes 5/25/2025 and 6/4/2025). The patient has memorialized his autonomous wishes through completion of a MOLST indicating that he would not want cardiopulmonary resuscitation nor intubation but was amenable to a trial of non-invasive ventilation (i.e., DNR/DNI and a trial of NIV).    As necessitated by the [facility] Department of Health, MOLST forms should be periodically reviewed, i.e., at least every 90 days or if the patient is transferred to another care location, has a major change in health status (for better or worse), or the patient or other decision-maker changes their mind about treatment.[4] This review is essential to preserve the patient’s autonomy, as “the patient's medical condition, prognosis, values, wishes, and goals of care may change over time”[4] and any requested changes to the previously expressed preferences should be accurately reflected on the MOLST    In some cases, a surrogate may have a valid reason to override a patient’s prior decision. For instance, the surrogate might have proof that the patient did not understand the decision they were making. The surrogate might contend that the clinical circumstances are different now (major change in health status) and the patient would no longer wish their care to be in a certain way. More generally, a surrogate has broad latitude to interpret the patient’s wishes and apply them to actual decisions when there is room for such interpretation. However, the surrogate does not have is the authority to interpose their own wishes and values as a basis to override prior expressed wishes of a capable patient.[5] Such an act would constrain a patient’s autonomy and defeat a key purpose of the statute: to ensure that such wishes would be respected in the event of a loss of capacity.    In this case, this hospital admission occurred 10 days after the MOLST was completed. Notably, the patient was readmitted for similar symptoms related to his chronic and complex medical conditions, which he had exhibited during the last admission when the MOLST was completed, as well as in the multiple admissions the patient has had in the last six months. As such, there have been no significant changes to this patient’s health status that may warrant a review of the MOLST. Furthermore, there are no known indications that the patient has changed his values, wishes, or goals of care.    Complicating this case was the decision made to intubate the patient despite his previously expressed directions. However, while the action of intubation was not aligned with the patient’s autonomous decision, the MOLST still remains a valid and legal representation of the patient’s directions to clinicians of what life-sustaining treatment may be provided to him while he remains incapacitated and should be honored as discussions are held with the patient’s family members and surrogate decision maker.    Such conversations about treatment decisions at the end-of-life are emotionally fraught and difficult but are necessary to have with family members regarding their loved ones. The healthcare  team is applauded for creating an atmosphere of clarity in communication with the patient’s family regarding the current plan of care. It is important to recognize that beneficence extends to easing the stress of the patient’s family who may be struggling with grief over the patient’s current clinical state and poor prognosis and may need to be granted some anthony in time to accept the plan of care.    Consult requested by: Sujit Hdez MD   Role: Attending     Service: Medicine  Consultant: Mariza Cam, MSN, RN, ECHO, [facility]-BC, [facility] (Ethics Fellow) Contact #s: [phone] (cell); [phone]    CONSULT PURPOSE: To assist the healthcare team with the ethical dilemma of a 70-year-old male with a complex medical history who was admitted for acute hypoxic respiratory failure and exacerbation of CHF with a poor prognosis, regarding goals of care.     CLINICAL SUMMARY: This is the case of a 70-year-old male with past medical history of COPD (on 2L home O2), diabetes mellitus, heart failure with reduced ejection fraction (EF =30%), coronary artery disease, ischemic cardiomyopathy (status post cardiac resynchronization therapy with defibrillator), hypertension, paroxysmal atrial fibrillation (status post ablation 1/14/25; on Eliquis) who presented to the ED from home via EMS with complaints of dyspnea on exertion and bilateral leg swelling. In the ED, patient was initially placed on bipap and workup was concerning for WBC > 50, and chest x-ray with bilateral opacities/pleural effusions. In the ED, prior to the Rapid Response, the Admitting team had a goals of care conversation with the patient, where he REAFFIRMED his DNR and DNI. While in ED, the patient became acutely hypoxic and later intubated for hypoxia and altered mentation. Of note, per chart review, patient was intubated over previous existing MOLST after discussions with the patient’s wife (see Critical Care note 6/14). Patient was subsequently admitted to MICU for acute hypoxic respiratory failure and CHF exacerbation. Cardiology, Infectious Disease, and Palliative Care on consult. Ethics consulted to facilitate optimization of communication with the team and the patient’s family regarding patient’s expressed goals of care.    Prognosis Estimate (survival in hrs, days, wks, mos, yrs): Poor, per team  Patient Decision-Making Capacity: Lacks Capacity (intubated, sedated, and critically ill)  Patient Aware of: Diagnosis: Unknown Prognosis: Unknown  Name of medical decision-maker should patient lack capacity: Neto Frey (son, [phone])   Role: Legal Surrogate [see discussions and analysis]  Decision-Maker (i.e., HCA or Surrogate) Aware of: Diagnosis: Yes Prognosis: Yes  Other Stakeholders: Pat Frey (wife, [phone]; [phone])  Evidence of Patient’s Preference of Life-Sustaining Treatment (Written or Oral): MOLST completed by patient with Dr. Lucas (Admitting Attending) on 5/25/25 who documented “Patient wants to be DNR and DNI status (even wants it tattooed on his chest)” and subsequently reconfirmed with Palliative Care Attending, Dr. Huddleston on 6/4/2025. Patient consented to DNR/DNI with trial NIV and would like to continue present medical management.   Resuscitation status: DNR:  Yes         DNI: Yes MOLST COMPLETED BY PATIENT ON 5/25/2025       Discussions:    Ethics consult initiated and chart reviewed on 6/27/2025.    Discussion with BURTON Hdez MD (Attending) and EZEQUIEL Granados NP (Palliative) on 6/27/2025 (12:32-12:47). Discussed case in detail. Patient with poor prognosis and currently intubated and sedated. Patient unlikely to be weaned of ventilator nor improve to clinical state where he may be able to participate in complex care planning discussions. Discussed previous existing MOLST completed by the patient and pending decision by family regarding transitions of care for tracheostomy versus palliative extubation.    Discussion with MARGARET Ramos MD (Palliative Attending) on 6/27/2025 (12:47-12:48). Discussed case in detail. Palliative has been following case and has had conversations with both the patient’s wife and son. The patient’s wife has deferred treatment decisions to the patient’s son. Palliative spoke with son yesterday regarding decision for tracheostomy versus palliative extubation and son has asked for time to consider decision.    Discussion with Neto Frey (Patient’s son) on 6/27/2025 (12:58-12:59). Call went unanswered. Voicemail left to return call to Ethics.    Bioethics Analysis: The central ethical issue that exists in this case is respecting the patient’s autonomy.     As the patient’s family has expressed treatment decisions on behalf of an incapacitated patient that would conflict with the patient’s previously expressed wishes, the clinical team is faced with the dilemma on how to best to maintain the patient’s autonomy.    Respect for a person’s autonomy involves granting patients (who have the capacity to make care-related decisions) the opportunity to determine what is in their best interest as it pertains to their health and well-being (and in alignment with their values and belief), after being fully informed by the healthcare team regarding potential interventions, including the benefits of accepting, or risks of declining, these interventions.[1]    To preserve and empower their ability to make autonomous choices, some patients decide in advance what medical interventions they would and would not like to be imposed on them and may also identify an individual to act on their behalf (according to their previously expressed wishes) should they become incapacitated from making these decisions. These decisions are then memorialized in advance directives or medical orders. The purpose of these directives and orders is to protect patients’ autonomous choices regarding their medical care should they lose the capacity to make such decisions.    Presently, Mr. Neto Frey is currently incapacitated from making medical decisions in his present clinical state. When a patient lacks capacity to make medical decisions, the clinical team may rely upon advance directives or previously completed Medical Orders for Life-Sustaining Treatment (MOLST) to assist in guiding treatment decisions should such documents exist. Additionally, the team can rely upon a health care agent the patient had previously appointed to be their decision-maker (i.e., health care proxy) [2], or if no documentation of a health care poxy exists, a legal surrogate as defined by the [facility] Health Care Decisions Act [3], to assist with treatment decisions made in the best interest of the patient if the patient’s previous wishes were unknown. To protect the patient’s expression of autonomy, and to act validly under the law, the patient’s surrogate or appointed health care agent, must make decisions in accordance with the patient’s expressed medical orders as stated on the MOLST.    Here, Mr. Neto Frey has previously expressed (both verbally and in writing) his treatment decisions regarding life-sustaining treatments during a previous hospitalization (see [facility] notes 5/25/2025, 6/4/2025 and 6/14/2025). The patient has memorialized his autonomous wishes through completion of a MOLST indicating that he would not want cardiopulmonary resuscitation nor intubation but was amenable to a trial of non-invasive ventilation (i.e., DNR/DNI and a trial of NIV). On 5/25/2025, he stated “he wants to be DNR and DNI status (even wants it tattooed on his chest), which is documented in the patient’s chart.     As necessitated by the [facility] Department of Health, MOLST forms should be periodically reviewed, i.e., at least every 90 days or if the patient is transferred to another care location, has a major change in health status (for better or worse), or the patient or other decision-maker changes their mind about treatment.[4] This review is essential to preserve the patient’s autonomy, as “the patient's medical condition, prognosis, values, wishes, and goals of care may change over time”[4] and any requested changes to the previously expressed preferences should be accurately reflected on the MOLST. Per the MOLST form, “even if the MOLST is not reviewed and renewed within 90 days, the last completed MOLST remains valid and must be followed”.      In some cases, a surrogate may have a valid reason to override a patient’s prior decision. For instance, the surrogate might have proof that the patient did not understand the decision they were making. The surrogate might contend that the clinical circumstances are different now (major change in health status) and the patient would no longer wish their care to be in a certain way. More generally, a surrogate has broad latitude to interpret the patient’s wishes and apply them to actual decisions when there is room for such interpretation. However, the surrogate does not have is the authority to interpose their own wishes and values as a basis to override prior expressed wishes of a capable patient.[5] Such an act would constrain a patient’s autonomy and defeat a key purpose of the statute: to ensure that such wishes would be respected in the event of a loss of capacity.    In this case, this hospital admission occurred 10 days after the MOLST was completed. Notably, the patient was readmitted for similar symptoms related to his chronic and complex medical conditions, which he had exhibited during the last admission when the MOLST was completed, as well as in the multiple admissions the patient has had in the last six months. As such, there have been no significant changes to this patient’s health status that may warrant a review of the MOLST. Furthermore, there are no known indications that the patient has changed his values, wishes, or goals of care. In fact, the patient REAFFIRMED THE MOLST ON THIS ADMISSION WITH THE ADMITTING TEAM, DOCUMENTED IN THE H&P.     Complicating this case was the decision made to intubate the patient despite his previously expressed directions. However, while the action of intubation was not aligned with the patient’s autonomous decision, the MOLST still remains a valid and legal representation of the patient’s directions to clinicians of what life-sustaining treatment may be provided to him while he remains incapacitated and should be honored as discussions are held with the patient’s family members and surrogate decision maker.    Such conversations about treatment decisions at the end-of-life are emotionally fraught and difficult but are necessary to have with family members regarding their loved ones. The healthcare  team is applauded for creating an atmosphere of clarity in communication with the patient’s family regarding the current plan of care. It is important to recognize that beneficence extends to easing the stress of the patient’s family who may be struggling with grief over the patient’s current clinical state and poor prognosis and may need to be granted some anthony in time to accept the plan of care.    Consult requested by: Sujit Hdez MD   Role: Attending     Service: Medicine  Consultant: Mariza Cam, MSN, RN, ECHO, [facility]-BC, [facility]-C (Ethics Fellow) Contact #s: [phone] (cell); [phone]    CONSULT PURPOSE: To assist the healthcare team with the ethical dilemma of a 70-year-old male with a complex medical history who was admitted for acute hypoxic respiratory failure and exacerbation of CHF with a poor prognosis, regarding goals of care.     CLINICAL SUMMARY: This is the case of a 70-year-old male with past medical history of COPD (on 2L home O2), diabetes mellitus, heart failure with reduced ejection fraction (EF =30%), coronary artery disease, ischemic cardiomyopathy (status post cardiac resynchronization therapy with defibrillator), hypertension, paroxysmal atrial fibrillation (status post ablation 1/14/25; on Eliquis) who presented to the ED from home via EMS with complaints of dyspnea on exertion and bilateral leg swelling. In the ED, patient was initially placed on bipap and workup was concerning for WBC > 50, and chest x-ray with bilateral opacities/pleural effusions. In the ED, prior to the Rapid Response, the Admitting team had a goals of care conversation with the patient, where he REAFFIRMED his DNR and DNI. While in ED, the patient became acutely hypoxic and later intubated for hypoxia and altered mentation. Of note, per chart review, patient was intubated over previous existing MOLST after discussions with the patient’s wife (see Critical Care note 6/14). Patient was subsequently admitted to MICU for acute hypoxic respiratory failure and CHF exacerbation. Cardiology, Infectious Disease, and Palliative Care on consult. Ethics consulted to facilitate optimization of communication with the team and the patient’s family regarding patient’s expressed goals of care.    Prognosis Estimate (survival in hrs, days, wks, mos, yrs): Poor, per team  Patient Decision-Making Capacity: Lacks Capacity (intubated, sedated, and critically ill)  Patient Aware of: Diagnosis: Unknown Prognosis: Unknown  Name of medical decision-maker should patient lack capacity: Neto Frey (son, [phone])   Role: Legal Surrogate [see discussions and analysis]  Decision-Maker (i.e., HCA or Surrogate) Aware of: Diagnosis: Yes Prognosis: Yes  Other Stakeholders: Pat Frey (wife, [phone]; [phone])  Evidence of Patient’s Preference of Life-Sustaining Treatment (Written or Oral): MOLST completed by patient with Dr. Lucas (Admitting Attending) on 5/25/25 who documented “Patient wants to be DNR and DNI status (even wants it tattooed on his chest)” and subsequently reconfirmed with Palliative Care Attending, Dr. Huddleston on 6/4/2025. Patient consented to DNR/DNI with trial NIV and would like to continue present medical management. MOLST for DNR and DNI was reaffirmed on this admission 6/14/25 by the Admitting team.   Resuscitation status: DNR:  Yes         DNI: Yes MOLST COMPLETED BY PATIENT ON 5/25/2025       Discussions:    Ethics consult initiated and chart reviewed on 6/27/2025.    Discussion with BURTON Hdez MD (Attending) and EZEQUIEL Granados NP (Palliative) on 6/27/2025 (12:32-12:47). Discussed case in detail. Patient with poor prognosis and currently intubated and sedated. Patient unlikely to be weaned of ventilator nor improve to clinical state where he may be able to participate in complex care planning discussions. Discussed previous existing MOLST completed by the patient and pending decision by family regarding transitions of care for tracheostomy versus palliative extubation.    Discussion with MARGARET Ramos MD (Palliative Attending) on 6/27/2025 (12:47-12:48). Discussed case in detail. Palliative has been following case and has had conversations with both the patient’s wife and son. The patient’s wife has deferred treatment decisions to the patient’s son. Palliative spoke with son yesterday regarding decision for tracheostomy versus palliative extubation and son has asked for time to consider decision.    Discussion with Neto Frey (Patient’s son) on 6/27/2025 (12:58-12:59). Call went unanswered. Voicemail left to return call to Ethics.    Bioethics Analysis: The central ethical issue that exists in this case is respecting the patient’s autonomy.     As the patient’s family has expressed treatment decisions on behalf of an incapacitated patient that would conflict with the patient’s previously expressed wishes, the clinical team is faced with the dilemma on how to best to maintain the patient’s autonomy.    Respect for a person’s autonomy involves granting patients (who have the capacity to make care-related decisions) the opportunity to determine what is in their best interest as it pertains to their health and well-being (and in alignment with their values and belief), after being fully informed by the healthcare team regarding potential interventions, including the benefits of accepting, or risks of declining, these interventions.[1]    To preserve and empower their ability to make autonomous choices, some patients decide in advance what medical interventions they would and would not like to be imposed on them and may also identify an individual to act on their behalf (according to their previously expressed wishes) should they become incapacitated from making these decisions. These decisions are then memorialized in advance directives or medical orders. The purpose of these directives and orders is to protect patients’ autonomous choices regarding their medical care should they lose the capacity to make such decisions.    Presently, Mr. Neto Frey is currently incapacitated from making medical decisions in his present clinical state. When a patient lacks capacity to make medical decisions, the clinical team may rely upon advance directives or previously completed Medical Orders for Life-Sustaining Treatment (MOLST) to assist in guiding treatment decisions should such documents exist. Additionally, the team can rely upon a health care agent the patient had previously appointed to be their decision-maker (i.e., health care proxy) [2], or if no documentation of a health care poxy exists, a legal surrogate as defined by the [facility] Family Health Care Decisions Act [3], to assist with treatment decisions made in the best interest of the patient if the patient’s previous wishes were unknown. To protect the patient’s expression of autonomy, and to act validly under the law, the patient’s surrogate or appointed health care agent, must make decisions in accordance with the patient’s expressed medical orders as stated on the MOLST.    Here, Mr. Neto Frey has previously expressed (both verbally and in writing) his treatment decisions regarding life-sustaining treatments during a previous hospitalization (see [facility] notes 5/25/2025, 6/4/2025 and 6/14/2025). The patient has memorialized his autonomous wishes through completion of a MOLST indicating that he would not want cardiopulmonary resuscitation nor intubation but was amenable to a trial of non-invasive ventilation (i.e., DNR/DNI and a trial of NIV). On 5/25/2025, he stated “he wants to be DNR and DNI status (even wants it tattooed on his chest), which is documented in the patient’s chart.     As necessitated by the [facility] Department of Health, MOLST forms should be periodically reviewed, i.e., at least every 90 days or if the patient is transferred to another care location, has a major change in health status (for better or worse), or the patient or other decision-maker changes their mind about treatment.[4] This review is essential to preserve the patient’s autonomy, as “the patient's medical condition, prognosis, values, wishes, and goals of care may change over time”[4] and any requested changes to the previously expressed preferences should be accurately reflected on the MOLST. Per the MOLST form, “even if the MOLST is not reviewed and renewed within 90 days, the last completed MOLST remains valid and must be followed”.      In some cases, a surrogate may have a valid reason to override a patient’s prior decision. For instance, the surrogate might have proof that the patient did not understand the decision they were making. The surrogate might contend that the clinical circumstances are different now (major change in health status) and the patient would no longer wish their care to be in a certain way. More generally, a surrogate has broad latitude to interpret the patient’s wishes and apply them to actual decisions when there is room for such interpretation. However, the surrogate does not have is the authority to interpose their own wishes and values as a basis to override prior expressed wishes of a capable patient.[5] Such an act would constrain a patient’s autonomy and defeat a key purpose of the statute: to ensure that such wishes would be respected in the event of a loss of capacity.    In this case, this hospital admission occurred 10 days after the MOLST was completed. Notably, the patient was readmitted for similar symptoms related to his chronic and complex medical conditions, which he had exhibited during the last admission when the MOLST was completed, as well as in the multiple admissions the patient has had in the last six months. As such, there have been no significant changes to this patient’s health status that may warrant a review of the MOLST. Furthermore, there are no known indications that the patient has changed his values, wishes, or goals of care. In fact, the patient REAFFIRMED THE MOLST ON THIS ADMISSION WITH THE ADMITTING TEAM, DOCUMENTED IN THE H&P.     Complicating this case was the decision made to intubate the patient despite his previously expressed directions. However, while the action of intubation was not aligned with the patient’s autonomous decision, the MOLST still remains a valid and legal representation of the patient’s directions to clinicians of what life-sustaining treatment may be provided to him while he remains incapacitated and should be honored as discussions are held with the patient’s family members and surrogate decision maker.    Such conversations about treatment decisions at the end-of-life are emotionally fraught and difficult but are necessary to have with family members regarding their loved ones. The healthcare  team is applauded for creating an atmosphere of clarity in communication with the patient’s family regarding the current plan of care. It is important to recognize that beneficence extends to easing the stress of the patient’s family who may be struggling with grief over the patient’s current clinical state and poor prognosis and may need to be granted some anthony in time to accept the plan of care.

## 2025-06-27 NOTE — PROGRESS NOTE ADULT - ASSESSMENT
70 year old male with past medical history of  COPD (on 2L home O2), DM 2  HFrEF 30%, CAD,  ischemic cardiomyopathy s/p CRT-D, HTN, paroxysmal A-fib (status post ablation 1/14/25) on amiodarone and  Eliquis who presented to ED w c/o shortness of breath and b/l  leg swelling.     #Acute on chronic HFrEF decompensated heart failure EF 31%   #Anemia   #Acute pulmonary edema  #COPD, not in active exacerbation  #pulm HTN multifactorial secondary to cardiomyopathy reduced EF, likely LAW, baseline COPD  #chronic hypoxia from all the above  #B/L small pleural effusions due to pulmonary edema s/p intubation   - Unclear cause of fevers- Possible drug induced vs Ischemic Colitis  - Cardiology follow up for diuretics   - D/C Vanc/Cefepime (6/23). Received full course of abx.  - hold bumex for now   - do SAT   - albuterol Q4 hrs standing   - might need to go back on precedex .   - daily abgs and x-ray chest while intubated     #Hypernatremia   - free water 250cc Q4 hrs   - dc D5w 70cc/ hr as sodium level improving.   - follow bmp   - fu nephrology       DVT PPX: eliques  GI PPX: pantoprazole   DIET: tube feed    ACTIVITY:   CODE STATUS: on prior molst pt is dnr, dni  - GOC. pt previously opted for dnr, dni on prior MOLST but was intubated on this admission. fu Palliative regarding tracheostomy/palliative extubation     DISPOSITION:     PENDING:   diuretics, code status , palliative care, nephrology fu, DTA culture, mrsa. sodium level

## 2025-06-27 NOTE — CONSULT NOTE ADULT - CONSULT REQUESTED DATE/TIME
14-Jun-2025
14-Jun-2025 07:00
14-Jun-2025 09:53
27-Jun-2025 00:30
17-Jun-2025 14:35
14-Jun-2025 12:50
16-Jun-2025

## 2025-06-27 NOTE — CONSULT NOTE ADULT - ASSESSMENT
RECOMMENDATION: The MOLST previously completed by the patient is a valid and legal representation of the patient’s directions regarding what life-sustaining treatments may be provided to the patient should he be incapacitated from making treatment decisions. Honoring the patient’s previously expressed directions regarding what treatments may or may not be imposed on him at the end-of-life maintains the dignity of patient as a person with moral status, while preserving his autonomy as a moral agent who had the capacity to make such decisions when the MOLST was completed.    The team should utilize the MOLST to facilitate discussions held with the patient’s family members and surrogate decisionmaker regarding the patient’s treatment plan. The team may consider affording the family some time to accept the plan of care and allow them the anthony to process their grief during this difficult time. Ethics remains available to assist with care conversations.    Thank you for including the Ethics Consultation Service in this challenging case.?  ?  Please do not hesitate to call us if there are any questions.?    Ethics Service will remain available for further conversation about the patient’s current condition and decision points that may occur.??  ?  CASE DISCUSSED WITH ETHICS ATTENDING DAMEON DUQUE MD, MPH, [facility] ( OF MEDICAL ETHICS)  ?  MORE THAN 50% OF THE TIME OF THIS CONSULTATION WAS SPENT IN COORDINATION OF CARE OF PATIENT    References    1 BURTON Asher., & Josias, J.F. (2019). Principles of biomedical ethics (8th ed.). Oxford University Press  2 [facility] PHL § 2981 (2025).    3 [facility] PHL § 2994 (2025)    4 [place] Department of Health (2025). [facility] frequently asked questions (FAQs). Accessed on May 14. 2025. https://www.health.[place].gov/professionals/patients/patient_rights/molst/frequently_asked_questions.htm    5 ROB Lagos., & Fely, JEANNE. (2015). Medical, ethical and legal obligations to honor individual preferences near the end of life. [place] [employer] Health and Law Journal, 20(2):28-33. Retrieved from https://molst.org/wp-content/uploads/2018/03/Health-Law-Journal-Ruben-2015.pdf RECOMMENDATION: The MOLST previously completed by the patient and REAFFIRMED ON THIS ADMISSION is a valid and legal representation of the patient’s directions regarding what life-sustaining treatments may be provided to the patient should he be incapacitated from making treatment decisions. Honoring the patient’s previously expressed directions regarding what treatments may or may not be imposed on him at the end-of-life maintains the dignity of patient as a person with moral status, while preserving his autonomy as a moral agent who had the capacity to make such decisions when the MOLST was completed.    The team should utilize the MOLST to facilitate discussions held with the patient’s family members and surrogate decisionmaker regarding the patient’s treatment plan. The team may consider affording the family some time to accept the plan of care and allow them the anthony to process their grief during this difficult time. Ethics remains available to assist with care conversations.    Thank you for including the Ethics Consultation Service in this challenging case.?  ?  Please do not hesitate to call us if there are any questions.?    Ethics Service will remain available for further conversation about the patient’s current condition and decision points that may occur.??  ?  CASE DISCUSSED WITH ETHICS ATTENDING DAMEON DUQUE MD, MS, MPH, [facility] ( OF MEDICAL ETHICS)  ?  MORE THAN 50% OF THE TIME OF THIS CONSULTATION WAS SPENT IN COORDINATION OF CARE OF PATIENT    References    1 BURTON Asher., & Josias, JAlfaF. (2019). Principles of biomedical ethics (8th ed.). Oxford University Press  2 [facility] PHL § 2981 (2025).  3 [facility] PHL § 2994 (2025)  4 [place] Department of Health (2025). [facility] frequently asked questions (FAQs). Accessed on May 14. 2025. https://www.health.[place].gov/professionals/patients/patient_rights/molst/frequently_asked_questions.htm  5 ROB Lagos., & JEANNE Geiger. (2015). Medical, ethical and legal obligations to honor individual preferences near the end of life. [place] [employer] Health and Law Journal, 20(2):28-33. Retrieved from https://molst.org/wp-content/uploads/2018/03/Health-Law-Journal-Ruben-2015.pdf

## 2025-06-27 NOTE — PROGRESS NOTE ADULT - ADDITIONAL CLINICIANS INVOLVED (NAME AND CREDENTIALS)
Hold Metformin for 3 weeks    Eat 3 meals daily     No sweets, no sweet beverages.    Scan Jose Manuel 8 times per day:  When you wake  Before each meal  2 hours after each meal  At bedtime    Melany will look at your Jose Manuel reports in 3 weeks and will call you to discuss.        Dalila Cintron MDIv

## 2025-06-27 NOTE — CONSULT NOTE ADULT - CONSULT REASON
To assist the healthcare team with the ethical dilemma of a 70-year-old male with a complex medical history who was admitted for acute hypoxic respiratory failure and exacerbation of CHF with a poor prognosis and unable to affirm his goals of care. To assist the healthcare team with the ethical dilemma of a 70-year-old male with a complex medical history who was admitted for acute hypoxic respiratory failure and exacerbation of CHF with a poor prognosis, regarding goals of care.

## 2025-06-27 NOTE — PROGRESS NOTE ADULT - SUBJECTIVE AND OBJECTIVE BOX
TOSINJAMEEMONIQUE CHRISTINE  70y, Male    LOS  13d    INTERVAL EVENTS/HPI  - T(F): , Max: 101.4 (06-26-25 @ 23:00)  - WBC Count: 24.15 (06-27-25 @ 06:06)  WBC Count: 28.66 (06-26-25 @ 05:54)  - Creatinine: 1.4 (06-26-25 @ 21:38)  Creatinine: 1.7 (06-26-25 @ 05:54)    REVIEW OF SYSTEMS:  CONSTITUTIONAL: No fever or chills  HEENT: No sore throat  RESPIRATORY: No cough, no shortness of breath  CARDIOVASCULAR: No chest pain or palpitations  GASTROINTESTINAL: No abdominal or epigastric pain  GENITOURINARY: No dysuria  NEUROLOGICAL: No headache/dizziness  MSK: No joint pain, erythema, or swelling; no back pain  SKIN: No itching, rashes  All other ROS negative except noted above    Prior hospital charts reviewed [Yes]  Primary team notes reviewed [Yes]  Other consultant notes reviewed [Yes]    ANTIMICROBIALS:       OTHER MEDS: MEDICATIONS  (STANDING):  acetaminophen     Tablet .. 650 every 6 hours PRN  albuterol/ipratropium for Nebulization 3 every 6 hours  aluminum hydroxide/magnesium hydroxide/simethicone Suspension 30 every 4 hours PRN  aMIOdarone    Tablet 200 daily  apixaban 5 every 12 hours  atorvastatin 40 at bedtime  clopidogrel Tablet 75 daily  dextrose 50% Injectable 25 once  dextrose 50% Injectable 12.5 once  dextrose 50% Injectable 25 once  dextrose Oral Gel 15 once PRN  ezetimibe 10 daily  fentaNYL   Infusion. 0.501 <Continuous>  glucagon  Injectable 1 once  guaiFENesin Oral Liquid (Sugar-Free) 200 every 6 hours PRN  insulin glargine Injectable (LANTUS) 34 at bedtime  insulin lispro (ADMELOG) corrective regimen sliding scale  every 6 hours  insulin lispro Injectable (ADMELOG) 8 every 6 hours  melatonin 5 at bedtime PRN  metoprolol tartrate 25 two times a day  midodrine. 5 three times a day PRN  ondansetron Injectable 4 every 8 hours PRN  pantoprazole  Injectable 40 two times a day  senna 2 at bedtime      Vital Signs Last 24 Hrs  T(F): 99.8 (06-27-25 @ 07:00), Max: 101.9 (06-23-25 @ 23:00)    Vital Signs Last 24 Hrs  HR: 96 (06-27-25 @ 06:00) (85 - 105)  BP: 117/59 (06-27-25 @ 07:00) (117/59 - 163/74)  RR: 28 (06-27-25 @ 07:00)  SpO2: 100% (06-27-25 @ 07:00) (98% - 100%)  Wt(kg): --    EXAM:  GENERAL: On MV  HEAD: No head lesions  NECK: Supple  CHEST/LUNG: Shallow breath sounds   HEART: S1 S2  ABDOMEN: Soft, nontender +Mcmahon  EXTREMITIES: No clubbing  MSK: +1 edema   SKIN: No rashes or lesions, no superficial thrombophlebitis    Labs:                        8.2    24.15 )-----------( 292      ( 27 Jun 2025 06:06 )             28.6     06-26    147[H]  |  111[H]  |  77[HH]  ----------------------------<  229[H]  4.6   |  27  |  1.4    Ca    8.5      26 Jun 2025 21:38  Mg     2.6     06-27    TPro  5.2[L]  /  Alb  2.9[L]  /  TBili  <0.2  /  DBili  x   /  AST  23  /  ALT  27  /  AlkPhos  55  06-26      WBC Trend:  WBC Count: 24.15 (06-27-25 @ 06:06)  WBC Count: 28.66 (06-26-25 @ 05:54)  WBC Count: 22.48 (06-25-25 @ 05:59)  WBC Count: 17.97 (06-24-25 @ 06:09)      Creatine Trend:  Creatinine: 1.4 (06-26)  Creatinine: 1.7 (06-26)  Creatinine: 1.7 (06-25)  Creatinine: 1.7 (06-25)      Liver Biochemical Testing Trend:  Alanine Aminotransferase (ALT/SGPT): 27 (06-26)  Alanine Aminotransferase (ALT/SGPT): 33 (06-25)  Alanine Aminotransferase (ALT/SGPT): 36 (06-24)  Alanine Aminotransferase (ALT/SGPT): 17 (06-23)  Alanine Aminotransferase (ALT/SGPT): 9 (06-22)  Aspartate Aminotransferase (AST/SGOT): 23 (06-26-25 @ 05:54)  Aspartate Aminotransferase (AST/SGOT): 23 (06-25-25 @ 05:59)  Aspartate Aminotransferase (AST/SGOT): 51 (06-24-25 @ 06:09)  Aspartate Aminotransferase (AST/SGOT): 25 (06-23-25 @ 06:20)  Aspartate Aminotransferase (AST/SGOT): 13 (06-22-25 @ 06:23)  Bilirubin Total: <0.2 (06-26)  Bilirubin Total: <0.2 (06-25)  Bilirubin Total: 0.2 (06-24)  Bilirubin Total: 0.2 (06-23)  Bilirubin Total: 0.3 (06-22)      Trend LDH  06-15-25 @ 06:45  375[H]      Urinalysis Basic - ( 26 Jun 2025 21:38 )    Color: x / Appearance: x / SG: x / pH: x  Gluc: 229 mg/dL / Ketone: x  / Bili: x / Urobili: x   Blood: x / Protein: x / Nitrite: x   Leuk Esterase: x / RBC: x / WBC x   Sq Epi: x / Non Sq Epi: x / Bacteria: x        MICROBIOLOGY:  Vancomycin Level, Trough: 14.5 (06-20 @ 06:16)  Vancomycin Level, Random: 11.4 (06-19 @ 06:27)  Vancomycin Level, Trough: 7.0 (06-18 @ 18:00)  Vancomycin Level, Trough: 8.1 (06-17 @ 11:01)  Vancomycin Level, Random: 14.5 (06-15 @ 06:45)    Male    Culture - Acid Fast - Sputum w/Smear (collected 24 Jun 2025 09:00)  Source: Trach Asp Tracheal Aspirate  Preliminary Report:    Culture is being performed.    Culture - Blood (collected 16 Jun 2025 06:28)  Source: Blood Blood  Final Report:    No growth at 5 days    Culture - Sputum (collected 15 Kleber 2025 16:44)  Source: Sputum Sputum  Final Report:    Commensal magda consistent with body site    Culture - Blood (collected 14 Jun 2025 15:36)  Source: Blood None  Final Report:    No growth at 5 days    Culture - Blood (collected 02 Jun 2025 18:05)  Source: Blood Blood  Final Report:    No growth at 5 days    Culture - Blood (collected 02 Jun 2025 18:05)  Source: Blood Blood  Final Report:    No growth at 5 days    Culture - Blood (collected 18 Apr 2025 22:18)  Source: Blood Blood  Final Report:    No growth at 5 days    Culture - Blood (collected 18 Apr 2025 22:17)  Source: Blood Blood  Final Report:    No growth at 5 days    Culture - Blood (collected 28 Mar 2025 20:45)  Source: Blood Blood-Peripheral  Final Report:    No growth at 5 days    Culture - Blood (collected 31 Dec 2024 15:45)  Source: .Blood BLOOD  Final Report:    No growth at 5 days      HIV-1/2 Combo Result: Nonreact (06-16-25 @ 06:28)  HIV-1/2 Combo Result: Nonreact (01-04-25 @ 05:58)  COVID-19 PCR: NotDetec (05-15-25 @ 09:50)    Blood Gas Arterial, Lactate: 0.8 (06-27 @ 06:23)  Blood Gas Arterial, Lactate: 0.6 (06-27 @ 05:00)  Blood Gas Arterial, Lactate: 0.9 (06-26 @ 17:25)  Blood Gas Arterial, Lactate: 0.8 (06-26 @ 04:26)        RADIOLOGY & ADDITIONAL TESTS:  I have personally reviewed the relevant images.   CXR      CT  < from: Xray Chest 1 View- PORTABLE-Routine (Xray Chest 1 View- PORTABLE-Routine in AM.) (06.26.25 @ 07:09) >    INTERPRETATION:  CLINICAL HISTORY: Intubated    COMPARISON: Yesterday.    TECHNIQUE: Portable frontal chest radiograph.    FINDINGS:    Support devices: Nasogastric coursing below diaphragm. Tip not   well-visualized. Endotracheal tube in satisfactory position. Left   subclavian AICD.    Cardiac/mediastinum/hilum: Stable.    Lung parenchyma/Pleura: Right basal opacity/effusion, unchanged. Improved   aeration left lung base.    Skeleton/soft tissues: Stable.      IMPRESSION:    As above    --- End of Report ---    < end of copied text >        WEIGHT  Weight (kg): 83.3 (06-14-25 @ 08:00)  Creatinine: 1.4 mg/dL (06-26-25 @ 21:38)      All available historical records have been reviewed       SERENAMONIQUE  70y, Male    LOS  13d    INTERVAL EVENTS/HPI  - T(F): , Max: 101.4 (06-26-25 @ 23:00)  - WBC Count: 24.15 (06-27-25 @ 06:06)  WBC Count: 28.66 (06-26-25 @ 05:54)  - Creatinine: 1.4 (06-26-25 @ 21:38)  Creatinine: 1.7 (06-26-25 @ 05:54)    REVIEW OF SYSTEMS:  CONSTITUTIONAL: No fever or chills  HEENT: No sore throat  RESPIRATORY: No cough, no shortness of breath  CARDIOVASCULAR: No chest pain or palpitations  GASTROINTESTINAL: No abdominal or epigastric pain  GENITOURINARY: No dysuria  NEUROLOGICAL: No headache/dizziness  MSK: No joint pain, erythema, or swelling; no back pain  SKIN: No itching, rashes  All other ROS negative except noted above    Prior hospital charts reviewed [Yes]  Primary team notes reviewed [Yes]  Other consultant notes reviewed [Yes]    ANTIMICROBIALS:     MEDICATIONS  (STANDING):  azithromycin  IVPB   250 mL/Hr IV Intermittent (06-14-25 @ 12:36)    cefepime   IVPB   100 mL/Hr IV Intermittent (06-20-25 @ 05:32)   100 mL/Hr IV Intermittent (06-19-25 @ 17:56)   100 mL/Hr IV Intermittent (06-19-25 @ 05:58)   100 mL/Hr IV Intermittent (06-18-25 @ 18:02)   100 mL/Hr IV Intermittent (06-18-25 @ 05:59)   100 mL/Hr IV Intermittent (06-17-25 @ 17:29)    cefepime   IVPB   100 mL/Hr IV Intermittent (06-22-25 @ 07:49)    cefepime   IVPB   100 mL/Hr IV Intermittent (06-23-25 @ 05:21)   100 mL/Hr IV Intermittent (06-22-25 @ 17:11)    cefepime   IVPB   100 mL/Hr IV Intermittent (06-17-25 @ 06:06)   100 mL/Hr IV Intermittent (06-16-25 @ 17:40)    cefepime   IVPB   100 mL/Hr IV Intermittent (06-16-25 @ 05:19)   100 mL/Hr IV Intermittent (06-15-25 @ 17:22)   100 mL/Hr IV Intermittent (06-15-25 @ 05:17)   100 mL/Hr IV Intermittent (06-14-25 @ 18:54)    cefTRIAXone   IVPB   100 mL/Hr IV Intermittent (06-14-25 @ 12:36)    vancomycin  IVPB   250 mL/Hr IV Intermittent (06-15-25 @ 17:25)    vancomycin  IVPB   166.67 mL/Hr IV Intermittent (06-14-25 @ 17:19)    vancomycin  IVPB   250 mL/Hr IV Intermittent (06-18-25 @ 20:45)   250 mL/Hr IV Intermittent (06-17-25 @ 17:30)    vancomycin  IVPB   250 mL/Hr IV Intermittent (06-19-25 @ 21:02)    vancomycin  IVPB   166.67 mL/Hr IV Intermittent (06-22-25 @ 07:59)        OTHER MEDS: MEDICATIONS  (STANDING):  acetaminophen     Tablet .. 650 every 6 hours PRN  albuterol/ipratropium for Nebulization 3 every 6 hours  aluminum hydroxide/magnesium hydroxide/simethicone Suspension 30 every 4 hours PRN  aMIOdarone    Tablet 200 daily  apixaban 5 every 12 hours  atorvastatin 40 at bedtime  clopidogrel Tablet 75 daily  dextrose 50% Injectable 25 once  dextrose 50% Injectable 12.5 once  dextrose 50% Injectable 25 once  dextrose Oral Gel 15 once PRN  ezetimibe 10 daily  fentaNYL   Infusion. 0.501 <Continuous>  glucagon  Injectable 1 once  guaiFENesin Oral Liquid (Sugar-Free) 200 every 6 hours PRN  insulin glargine Injectable (LANTUS) 34 at bedtime  insulin lispro (ADMELOG) corrective regimen sliding scale  every 6 hours  insulin lispro Injectable (ADMELOG) 8 every 6 hours  melatonin 5 at bedtime PRN  metoprolol tartrate 25 two times a day  midodrine. 5 three times a day PRN  ondansetron Injectable 4 every 8 hours PRN  pantoprazole  Injectable 40 two times a day  senna 2 at bedtime      Vital Signs Last 24 Hrs  T(F): 99.8 (06-27-25 @ 07:00), Max: 101.9 (06-23-25 @ 23:00)    Vital Signs Last 24 Hrs  HR: 96 (06-27-25 @ 06:00) (85 - 105)  BP: 117/59 (06-27-25 @ 07:00) (117/59 - 163/74)  RR: 28 (06-27-25 @ 07:00)  SpO2: 100% (06-27-25 @ 07:00) (98% - 100%)  Wt(kg): --    EXAM:  GENERAL: On MV  HEAD: No head lesions  NECK: Supple  CHEST/LUNG: Shallow breath sounds   HEART: S1 S2  ABDOMEN: Soft, nontender +Mcmahon  EXTREMITIES: No clubbing  MSK: +1 edema   SKIN: No rashes or lesions, no superficial thrombophlebitis    Labs:                        8.2    24.15 )-----------( 292      ( 27 Jun 2025 06:06 )             28.6     06-26    147[H]  |  111[H]  |  77[HH]  ----------------------------<  229[H]  4.6   |  27  |  1.4    Ca    8.5      26 Jun 2025 21:38  Mg     2.6     06-27    TPro  5.2[L]  /  Alb  2.9[L]  /  TBili  <0.2  /  DBili  x   /  AST  23  /  ALT  27  /  AlkPhos  55  06-26      WBC Trend:  WBC Count: 24.15 (06-27-25 @ 06:06)  WBC Count: 28.66 (06-26-25 @ 05:54)  WBC Count: 22.48 (06-25-25 @ 05:59)  WBC Count: 17.97 (06-24-25 @ 06:09)      Creatine Trend:  Creatinine: 1.4 (06-26)  Creatinine: 1.7 (06-26)  Creatinine: 1.7 (06-25)  Creatinine: 1.7 (06-25)      Liver Biochemical Testing Trend:  Alanine Aminotransferase (ALT/SGPT): 27 (06-26)  Alanine Aminotransferase (ALT/SGPT): 33 (06-25)  Alanine Aminotransferase (ALT/SGPT): 36 (06-24)  Alanine Aminotransferase (ALT/SGPT): 17 (06-23)  Alanine Aminotransferase (ALT/SGPT): 9 (06-22)  Aspartate Aminotransferase (AST/SGOT): 23 (06-26-25 @ 05:54)  Aspartate Aminotransferase (AST/SGOT): 23 (06-25-25 @ 05:59)  Aspartate Aminotransferase (AST/SGOT): 51 (06-24-25 @ 06:09)  Aspartate Aminotransferase (AST/SGOT): 25 (06-23-25 @ 06:20)  Aspartate Aminotransferase (AST/SGOT): 13 (06-22-25 @ 06:23)  Bilirubin Total: <0.2 (06-26)  Bilirubin Total: <0.2 (06-25)  Bilirubin Total: 0.2 (06-24)  Bilirubin Total: 0.2 (06-23)  Bilirubin Total: 0.3 (06-22)      Trend LDH  06-15-25 @ 06:45  375[H]      Urinalysis Basic - ( 26 Jun 2025 21:38 )    Color: x / Appearance: x / SG: x / pH: x  Gluc: 229 mg/dL / Ketone: x  / Bili: x / Urobili: x   Blood: x / Protein: x / Nitrite: x   Leuk Esterase: x / RBC: x / WBC x   Sq Epi: x / Non Sq Epi: x / Bacteria: x        MICROBIOLOGY:  Vancomycin Level, Trough: 14.5 (06-20 @ 06:16)  Vancomycin Level, Random: 11.4 (06-19 @ 06:27)  Vancomycin Level, Trough: 7.0 (06-18 @ 18:00)  Vancomycin Level, Trough: 8.1 (06-17 @ 11:01)  Vancomycin Level, Random: 14.5 (06-15 @ 06:45)    Male    Culture - Acid Fast - Sputum w/Smear (collected 24 Jun 2025 09:00)  Source: Trach Asp Tracheal Aspirate  Preliminary Report:    Culture is being performed.    Culture - Blood (collected 16 Jun 2025 06:28)  Source: Blood Blood  Final Report:    No growth at 5 days    Culture - Sputum (collected 15 Kleber 2025 16:44)  Source: Sputum Sputum  Final Report:    Commensal magda consistent with body site    Culture - Blood (collected 14 Jun 2025 15:36)  Source: Blood None  Final Report:    No growth at 5 days    Culture - Blood (collected 02 Jun 2025 18:05)  Source: Blood Blood  Final Report:    No growth at 5 days    Culture - Blood (collected 02 Jun 2025 18:05)  Source: Blood Blood  Final Report:    No growth at 5 days    Culture - Blood (collected 18 Apr 2025 22:18)  Source: Blood Blood  Final Report:    No growth at 5 days    Culture - Blood (collected 18 Apr 2025 22:17)  Source: Blood Blood  Final Report:    No growth at 5 days    Culture - Blood (collected 28 Mar 2025 20:45)  Source: Blood Blood-Peripheral  Final Report:    No growth at 5 days    Culture - Blood (collected 31 Dec 2024 15:45)  Source: .Blood BLOOD  Final Report:    No growth at 5 days      HIV-1/2 Combo Result: Nonreact (06-16-25 @ 06:28)  HIV-1/2 Combo Result: Nonreact (01-04-25 @ 05:58)  COVID-19 PCR: NotDetec (05-15-25 @ 09:50)    Blood Gas Arterial, Lactate: 0.8 (06-27 @ 06:23)  Blood Gas Arterial, Lactate: 0.6 (06-27 @ 05:00)  Blood Gas Arterial, Lactate: 0.9 (06-26 @ 17:25)  Blood Gas Arterial, Lactate: 0.8 (06-26 @ 04:26)        RADIOLOGY & ADDITIONAL TESTS:  I have personally reviewed the relevant images.   CXR      CT  < from: Xray Chest 1 View- PORTABLE-Routine (Xray Chest 1 View- PORTABLE-Routine in AM.) (06.26.25 @ 07:09) >    INTERPRETATION:  CLINICAL HISTORY: Intubated    COMPARISON: Yesterday.    TECHNIQUE: Portable frontal chest radiograph.    FINDINGS:    Support devices: Nasogastric coursing below diaphragm. Tip not   well-visualized. Endotracheal tube in satisfactory position. Left   subclavian AICD.    Cardiac/mediastinum/hilum: Stable.    Lung parenchyma/Pleura: Right basal opacity/effusion, unchanged. Improved   aeration left lung base.    Skeleton/soft tissues: Stable.      IMPRESSION:    As above    --- End of Report ---    < end of copied text >        WEIGHT  Weight (kg): 83.3 (06-14-25 @ 08:00)  Creatinine: 1.4 mg/dL (06-26-25 @ 21:38)      All available historical records have been reviewed

## 2025-06-27 NOTE — PROGRESS NOTE ADULT - ASSESSMENT
IMPRESSION:    Acute on chronic HFrEF decompensated heart failure EF 31%   Anemia  Acute pulmonary edema  COPD, not in active exacerbation  pulm HTN multifactorial secondary to cardiomyopathy reduced EF, likely LAW, baseline COPD  chronic hypoxia from all the above  Small pleural effusions due to pulmonary edema      PLAN:    CNS: Daily SAT, off PRECEDEX and watch fever curve, f/u TG level on propofol      HEENT: Oral care    PULMONARY:  HOB @ 45 degrees. CXR with BL effusions, Duonebs q 6 hrs, Vent Changes: Reduce RR to 22, monitor for vent synchrony, f/u Peak and Plateau pressures, allow permissive hypercapnia       CARDIOVASCULAR: TTE noted , volume contraction as tolerated, Cardiology follow up, hold Bumex, Dc D5W      GI: GI prophylaxis. Feeding. C. Diff GI PCR negative, trend Hb stable, f/u CT abdomen w/ no active bleed, free water 300cc Q3 hrs flushes      RENAL:  Follow up lytes. Correct as needed, Senna and Miralax for BM, f/u nephro eval noted     INFECTIOUS DISEASE: Follow up cultures, MRSA +, finished course of vancomycin and cefepime, f/u ID reccs, trend WBC, if still febrile will need repeat CT scan abdomen     HEMATOLOGICAL: On AC, okay per GI, f/u Us duplex LE with no DVT     ENDOCRINE:  Follow up FS. Insulin protocol if needed.    MUSCULOSKELETAL: off loading     Palliative follow up  Poor prognosis   MICU monitoring    IMPRESSION:    Acute on chronic HFrEF decompensated heart failure EF 31%   Anemia  Acute pulmonary edema  COPD, not in active exacerbation  pulm HTN multifactorial secondary to cardiomyopathy reduced EF, likely LAW, baseline COPD  chronic hypoxia from all the above  Small pleural effusions due to pulmonary edema      PLAN:    CNS: Daily SAT, off PRECEDEX and watch fever curve, f/u TG level on propofol      HEENT: Oral care    PULMONARY:  HOB @ 45 degrees. CXR with BL effusions, Duonebs q 6 hrs, Vent Changes: Reduce RR to 22, monitor for vent synchrony, f/u Peak and Plateau pressures, allow permissive hypercapnia     will proceed with bronch secretion increase temp   CARDIOVASCULAR: TTE noted , volume contraction as tolerated, Cardiology follow up, hold Bumex, keep D5W  d/c when Na less then 150    GI: GI prophylaxis. Feeding. C. Diff GI PCR negative, trend Hb stable, f/u CT abdomen w/ no active bleed, free water 300cc Q3 hrs flushes      RENAL:  Follow up lytes. Correct as needed, Senna and Miralax for BM, f/u nephro eval noted     INFECTIOUS DISEASE: Follow up cultures, MRSA +, finished course of vancomycin and cefepime, f/u ID reccs, trend WBC, if still febrile will need repeat CT scan abdomen     HEMATOLOGICAL: On AC, okay per GI, f/u Us duplex LE with no DVT     ENDOCRINE:  Follow up FS. Insulin protocol if needed.    MUSCULOSKELETAL: off loading     Palliative follow up  Poor prognosis   MICU monitoring

## 2025-06-27 NOTE — PROCEDURE NOTE - NSBRONCHFINDINGS_GEN_A_CORE_FT
Thick copious secretions from BL lung fields   Normal mucosa  No endobronchial lesions in areas inspected

## 2025-06-27 NOTE — PROGRESS NOTE ADULT - ASSESSMENT
70M with PMH of COPD, DM2, HFrEF, CAD, ICM s/p CRT-D, HTN, pAF< eliquis here with LESLIE and bilateral leg swelling, and found to have acute HFrEF and pulmonary edema, on vent, nebs, steroids, and bumex. Will need ischemic eval eventually. Patient was DNR/DNI/trial NIV on recent admission after d/w palliative, but wife opted this admission for intubation. Palliative c/s for GOC.    - see GOC above, d/w son Neto (HCP)   - s/p bronch, c/w IV abx  - acutely ill in ICU, dependent on vent, care per ICU team  - no acute symptoms  - d/w medical team  - will follow    ______________  Wolfgang Ramos MD  Palliative Medicine  [facility]   of Geriatric and Palliative [facility]  [phone]

## 2025-06-27 NOTE — PROCEDURE NOTE - NSBRONCHPROCDETAILS_GEN_A_CORE_FT
PROCEDURE PERFORMED:  Bronchoscopy, BAL, and washing.    CONSENT: After explanining the risk and benefit the consent was obtained from son     The patient had been previously intubated and was on mechanical ventilatory support. He was on sedation, which was continued and adjusted during the procedure. His FiO2 was increased to 100% during the procedure. The  fiberoptic bronchoscope was introduced through an endotracheal tube adaptor and the tip of the endotracheal tube was noted to be in good position above the oscar.   The Right tracheobronchial tree was inspected closely to the level of the subsegmental bronchi. All bronchi are patent with no endobronchial lesions and no mucosal lesions noted.   The Left tracheobronchial tree was also patent and intact with the mucosa normal   The procedure was completed and all samples were submitted for appropriate studies  After adequate clearing of secretions was accomplished, the bronchoscope was removed from the patient and the procedure was terminated.   The patient tolerated the procedure well and there were no complications. PROCEDURE PERFORMED:  Bronchoscopy, BAL, and washing.    CONSENT: After explanining the risk and benefit the consent was obtained from son     The patient had been previously intubated and was on mechanical ventilatory support. He was on sedation, which was continued and adjusted during the procedure. His FiO2 was increased to 100% during the procedure. The  fiberoptic bronchoscope was introduced through an endotracheal tube adaptor and the tip of the endotracheal tube was noted to be in good position above the oscar.   The Right tracheobronchial tree was inspected closely to the level of the subsegmental bronchi.  positive significant mucopurulent secretion   BAL was performed from RLL   The Left tracheobronchial tree positive for mucopurulent secretion which was suctioned   The procedure was completed and all samples were submitted for appropriate studies  After adequate clearing of secretions was accomplished, the bronchoscope was removed from the patient and the procedure was terminated.   The patient tolerated the procedure well and there were no complications.  BAL fluid was sent for cx and analyses   start zosy

## 2025-06-28 NOTE — PROGRESS NOTE ADULT - ASSESSMENT
#Acute on chronic HFrEF  #Acute pulmonary edema  #COPD, not in active exacerbation  #pulm HTN   - poorly weaning  - precedex prn    #Hypernatremia   - resolved    will discuss code status with son  will prepare to reinstate dnr as per MOLST form reaffirmed on admission by patient

## 2025-06-28 NOTE — PROGRESS NOTE ADULT - SUBJECTIVE AND OBJECTIVE BOX
Patient is a 70y old  Male who presents with a chief complaint of SOB (27 Jun 2025 18:00)        HPI:   Patient is a  70 year old male with past medical history of  COPD (on 2L home O2), DM 2  HFrEF 30%, CAD,  ischemic cardiomyopathy s/p CRT-D, HTN, paroxysmal A-fib (status post ablation 1/14/25) on amiodarone and  Eliquis who presented to ED w c/o LESLIE and b/l  leg swelling.  Patient said it started 2 days ago and felt like his previous heart failure exacerbations. Patient denied n/v/f/c; denied HA lightheadedness; denied palpitations cough CP (now resolved on admission).  (14 Jun 2025 02:21)      PAST MEDICAL & SURGICAL HISTORY:  CHF (congestive heart failure)      Diabetes      CAD (coronary artery disease)      Chronic obstructive pulmonary disease (COPD)      HTN (hypertension)      Stented coronary artery      Dyslipidemia      GERD (gastroesophageal reflux disease)      Afib      CVA (cerebral vascular accident)      H/O heart artery stent      Heart valve replaced  aortic      History of Nissen fundoplication          FAMILY HISTORY:        Pt evaluated on rounds.  I reviewed the radiology tests and hospital record.    I reviewed previous notes on this patient.    Interval Events: No overnight events.      REVIEW OF SYSTEMS:   see HPI      OBJECTIVE:  ICU Vital Signs Last 24 Hrs  T(C): 37.1 (28 Jun 2025 04:00), Max: 37.7 (27 Jun 2025 07:00)  T(F): 98.8 (28 Jun 2025 04:00), Max: 99.8 (27 Jun 2025 07:00)  HR: 95 (28 Jun 2025 05:00) (75 - 102)  BP: 131/61 (28 Jun 2025 05:00) (106/52 - 168/79)  BP(mean): 88 (28 Jun 2025 05:00) (75 - 113)  ABP: --  ABP(mean): --  RR: 22 (28 Jun 2025 05:00) (18 - 28)  SpO2: 99% (28 Jun 2025 05:00) (97% - 100%)    O2 Parameters below as of 28 Jun 2025 05:00  Patient On (Oxygen Delivery Method): ventilator    O2 Concentration (%): 40      Mode: AC/ CMV (Assist Control/ Continuous Mandatory Ventilation), RR (machine): 20, TV (machine): 440, FiO2: 40, PEEP: 8, ITime: 0.8, MAP: 7, PIP: 18    06-26 @ 07:01 - 06-27 @ 07:00  --------------------------------------------------------  IN: 4660.2 mL / OUT: 1735 mL / NET: 2925.2 mL    06-27 @ 07:01 - 06-28 @ 05:51  --------------------------------------------------------  IN: 3750.6 mL / OUT: 1480 mL / NET: 2270.6 mL      CAPILLARY BLOOD GLUCOSE      POCT Blood Glucose.: 179 mg/dL (28 Jun 2025 05:27)        PHYSICAL EXAM:     · ENMT:   Airway patent,   Nasal mucosa clear.  Mouth with normal mucosa.   No thrush    · EYES:   Clear bilaterally,   pupils equal,   round and reactive to light.    · CARDIAC:   Normal rate,   regular rhythm.    Heart sounds S1, S2.   No murmurs, no rubs or gallops on auscultation  no edema        CAROTID:   normal systolic impulse  no bruits    · RESPIRATORY:   rales  normal chest expansion  no retractions or use of accessory muscles  percussion of chest demonstrates no hyperresonance or dullness    · GASTROINTESTINAL:  Abdomen soft,   non-tender,   + BS  liver/spleen not palpable    · SKIN:   Skin normal color for race,   warm, dry   No evidence of rash.    · HEME LYMPH:   no splenomegaly.  No cervical  lymphadenopathy.  no inguinal lymphadenopathy    HOSPITAL MEDICATIONS:  MEDICATIONS  (STANDING):  albuterol/ipratropium for Nebulization 3 milliLiter(s) Nebulizer every 6 hours  aMIOdarone    Tablet 200 milliGRAM(s) Oral daily  apixaban 5 milliGRAM(s) Enteral Tube every 12 hours  atorvastatin 40 milliGRAM(s) Oral at bedtime  chlorhexidine 0.12% Liquid 15 milliLiter(s) Oral Mucosa every 12 hours  chlorhexidine 2% Cloths 1 Application(s) Topical <User Schedule>  clopidogrel Tablet 75 milliGRAM(s) Enteral Tube daily  dextrose 5%. 1000 milliLiter(s) (100 mL/Hr) IV Continuous <Continuous>  dextrose 5%. 1000 milliLiter(s) (50 mL/Hr) IV Continuous <Continuous>  dextrose 50% Injectable 25 Gram(s) IV Push once  dextrose 50% Injectable 12.5 Gram(s) IV Push once  dextrose 50% Injectable 25 Gram(s) IV Push once  ezetimibe 10 milliGRAM(s) Oral daily  fentaNYL   Infusion. 0.501 MICROgram(s)/kG/Hr (4.17 mL/Hr) IV Continuous <Continuous>  glucagon  Injectable 1 milliGRAM(s) IntraMuscular once  insulin glargine Injectable (LANTUS) 34 Unit(s) SubCutaneous at bedtime  insulin lispro (ADMELOG) corrective regimen sliding scale   SubCutaneous every 6 hours  insulin lispro Injectable (ADMELOG) 8 Unit(s) SubCutaneous every 6 hours  meropenem  IVPB 1000 milliGRAM(s) IV Intermittent every 12 hours  meropenem  IVPB      metoprolol tartrate 25 milliGRAM(s) Oral two times a day  pantoprazole  Injectable 40 milliGRAM(s) IV Push two times a day  senna 2 Tablet(s) Oral at bedtime  vancomycin  IVPB 1250 milliGRAM(s) IV Intermittent every 24 hours    MEDICATIONS  (PRN):  acetaminophen     Tablet .. 650 milliGRAM(s) Oral every 6 hours PRN Temp greater or equal to 38C (100.4F), Mild Pain (1 - 3)  aluminum hydroxide/magnesium hydroxide/simethicone Suspension 30 milliLiter(s) Oral every 4 hours PRN Dyspepsia  dextrose Oral Gel 15 Gram(s) Oral once PRN Blood Glucose LESS THAN 70 milliGRAM(s)/deciliter  guaiFENesin Oral Liquid (Sugar-Free) 200 milliGRAM(s) Oral every 6 hours PRN Cough  melatonin 5 milliGRAM(s) Oral at bedtime PRN Insomnia  midodrine. 5 milliGRAM(s) Oral three times a day PRN Hypotension  ondansetron Injectable 4 milliGRAM(s) IV Push every 8 hours PRN Nausea and/or Vomiting        LABS:                        8.2    24.15 )-----------( 292      ( 27 Jun 2025 06:06 )             28.6     06-27    146  |  108  |  82[]  ----------------------------<  266[H]  4.6   |  27  |  1.4    Ca    8.6      27 Jun 2025 06:06  Mg     2.6     06-27    TPro  5.1[L]  /  Alb  2.7[L]  /  TBili  <0.2  /  DBili  x   /  AST  31  /  ALT  25  /  AlkPhos  57  06-27      Urinalysis Basic - ( 27 Jun 2025 06:06 )    Color: x / Appearance: x / SG: x / pH: x  Gluc: 266 mg/dL / Ketone: x  / Bili: x / Urobili: x   Blood: x / Protein: x / Nitrite: x   Leuk Esterase: x / RBC: x / WBC x   Sq Epi: x / Non Sq Epi: x / Bacteria: x      Arterial Blood Gas:  06-28 @ 04:49  7.33/57/105/30/99.4/3.3  ABG lactate: --  Arterial Blood Gas:  06-27 @ 18:44  7.34/56/134/30/99.8/3.1  ABG lactate: --  Arterial Blood Gas:  06-27 @ 06:23  7.44/42/105/28/99.7/3.9  ABG lactate: --  Arterial Blood Gas:  06-27 @ 05:00  7.29/62/125/30/99.4/2.4  ABG lactate: --  Arterial Blood Gas:  06-26 @ 17:25  7.37/53/113/31/99.9/4.5  ABG lactate: --      Mode: AC/ CMV (Assist Control/ Continuous Mandatory Ventilation), RR (machine): 20, TV (machine): 440, FiO2: 40, PEEP: 8, ITime: 0.8, MAP: 7, PIP: 18        SARS-CoV-2: NotDetec (03 Jun 2025 12:43)  COVID-19 PCR: NotDetec (15 May 2025 09:50)  SARS-CoV-2: NotDetec (10 Dameon 2025 15:31)  SARS-CoV-2: NotDetec (02 Jan 2025 20:30)    Mode: AC/ CMV (Assist Control/ Continuous Mandatory Ventilation)  RR (machine): 20  TV (machine): 440  FiO2: 40  PEEP: 8  ITime: 0.8  MAP: 7  PIP: 18      ABG - ( 28 Jun 2025 04:49 )  pH, Arterial: 7.33  pH, Blood: x     /  pCO2: 57    /  pO2: 105   / HCO3: 30    / Base Excess: 3.3   /  SaO2: 99.4                RADIOLOGY: Today I personally interpreted the latest CXR and other pertinent films.

## 2025-06-28 NOTE — PROGRESS NOTE ADULT - SUBJECTIVE AND OBJECTIVE BOX
Pt calm on vent    T(F): , Max: 99.1 (06-28-25 @ 08:12)  HR: 88 (06-28-25 @ 22:00) (70 - 105)  BP: 102/54 (06-28-25 @ 23:00)  RR: 21 (06-28-25 @ 23:00)  SpO2: 98% (06-28-25 @ 23:00)  IN: 3810.6 mL / OUT: 1505 mL / NET: 2305.6 mL    IN: 1347.5 mL / OUT: 1010 mL / NET: 337.5 mL      General: No apparent distress  Cardiovascular: S1, S2  Gastrointestinal: Soft, Non-tender, Non-distended  Respiratory: vented  Lymphatic: No edema  Neurologic: sedated  Dermatologic: Skin dry                          8.3    19.25 )-----------( 325      ( 28 Jun 2025 06:28 )             28.5     06-28    142  |  107  |  71[HH]  ----------------------------<  171[H]  4.3   |  25  |  1.2    Ca    8.6      28 Jun 2025 06:28  Mg     2.5     06-28    TPro  4.9[L]  /  Alb  2.6[L]  /  TBili  <0.2  /  DBili  x   /  AST  32  /  ALT  25  /  AlkPhos  58  06-28      Culture - Acid Fast - Bronchial w/Smear (collected 27 Jun 2025 13:45)  Source: Bronch Wash Bronchial Wash  Preliminary Report (28 Jun 2025 23:07):    Culture is being performed.    Culture - Acid Fast - Bronchial w/Smear (collected 27 Jun 2025 13:40)  Source: Bronchial Bronchial  Preliminary Report (28 Jun 2025 23:07):    Culture is being performed.    Culture - Bronchial (collected 27 Jun 2025 11:55)  Source: Lavage None  Gram Stain (28 Jun 2025 06:59):    Numerous polymorphonuclear leukocytes seen per low power field    No squamous epithelial cells seen per low power field    Rare Gram Variable Rods seen per oil power field    Rare Yeast like cells seen per oil power field  Preliminary Report (28 Jun 2025 16:25):    Commensal magda consistent with body site    Culture - Bronchial (collected 27 Jun 2025 11:55)  Source: Bronch Wash None  Gram Stain (28 Jun 2025 07:03):    Moderate polymorphonuclear leukocytes seen per low power field    No squamous epithelial cells seen per low power field    No organisms seen per oil power field  Preliminary Report (28 Jun 2025 17:02):    No growth to date    Culture - Blood (collected 27 Jun 2025 06:06)  Source: Blood None  Preliminary Report (28 Jun 2025 17:02):    No growth at 24 hours

## 2025-06-28 NOTE — PROGRESS NOTE ADULT - ASSESSMENT
IMPRESSION:    Acute on chronic HFrEF decompensated heart failure EF 31%   Anemia  Acute pulmonary edema  COPD, not in active exacerbation  pulm HTN multifactorial secondary to cardiomyopathy reduced EF, likely LAW, baseline COPD  chronic hypoxia from all the above  Small pleural effusions due to pulmonary edema      PLAN:    CNS: Daily SAT, off PRECEDEX and watch fever curve, f/u TG level on propofol      HEENT: Oral care    PULMONARY:  HOB @ 45 degrees. CXR with BL effusions, Duonebs q 6 hrs, Vent Changes: , monitor for vent synchrony, f/u Peak and Plateau pressures, allow permissive hypercapnia         CARDIOVASCULAR: TTE noted , volume contraction as   tolerated, Cardiology follow up, hold Bumex,     GI: GI prophylaxis. Feeding. C. Diff GI PCR negative, trend Hb stable, f/u CT abdomen w/ no active bleed, free water 300cc Q3 hrs flushes      RENAL:  Follow up lytes. Correct as needed, Senna and Miralax for BM, f/u nephro eval noted     INFECTIOUS DISEASE: Follow up cultures, MRSA +, finished course of vancomycin and cefepime, f/u ID reccs, trend WBC, if still febrile will need repeat CT scan abdomen     HEMATOLOGICAL: On AC, okay per GI, f/u Us duplex LE with no DVT     ENDOCRINE:  Follow up FS. Insulin protocol if needed.    MUSCULOSKELETAL: off loading     Palliative follow up  Poor prognosis   MICU monitoring

## 2025-06-29 NOTE — PROGRESS NOTE ADULT - ASSESSMENT
#Acute on chronic HFrEF  #Acute pulmonary edema  #COPD, not in active exacerbation  #pulm HTN   - poorly weaning  - precedex prn    #Hypernatremia   - resolved    no family at bedside today  will prepare to reinstate dnr as per MOLST form reaffirmed on admission by patient, once son notified

## 2025-06-29 NOTE — PROGRESS NOTE ADULT - ASSESSMENT
IMPRESSION:    Acute on chronic HFrEF decompensated heart failure EF 31%   Anemia  Acute pulmonary edema  COPD, not in active exacerbation  pulm HTN multifactorial secondary to cardiomyopathy reduced EF, likely LAW, baseline COPD  chronic hypoxia from all the above  Small pleural effusions due to pulmonary edema      PLAN:    CNS: Daily SAT, off PRECEDEX and watch fever curve, f/u TG level on propofol      HEENT: Oral care    PULMONARY:  HOB @ 45 degrees. CXR with BL effusions, Duonebs q 6 hrs, Vent Changes: , monitor for vent synchrony, f/u Peak and Plateau pressures, allow permissive hypercapnia         CARDIOVASCULAR: TTE noted , volume contraction as   tolerated, Cardiology follow up,   restart Bumex, CXR getting wetter    GI: GI prophylaxis. Feeding.   , trend Hb stable,   f/u CT abdomen w/ no active bleed,   no more free water flushes      RENAL:  Follow up lytes. Correct as needed, Senna and Miralax for BM, f/u nephro eval noted     INFECTIOUS DISEASE: Follow up cultures, MRSA +, finished course of vancomycin and cefepime, f/u ID reccs, trend WBC, if still febrile will need repeat CT scan abdomen     HEMATOLOGICAL: On AC, okay per GI, f/u Us duplex LE with no DVT     ENDOCRINE:  Follow up FS. Insulin protocol if needed.    MUSCULOSKELETAL: off loading     Palliative follow up  Poor prognosis   MICU monitoring

## 2025-06-29 NOTE — PHARMACOTHERAPY INTERVENTION NOTE - NSPHARMCOMMASP
ASP - Lab/ test recommended
ASP - Lab/ test recommended
ASP - Renal dose adjustment
ASP - Therapy recommended/ Alternative therapy
ASP - Renal dose adjustment
ASP - Therapy recommended/ Alternative therapy
ASP - Therapy recommended/ Alternative therapy

## 2025-06-29 NOTE — PROGRESS NOTE ADULT - SUBJECTIVE AND OBJECTIVE BOX
Patient is a 70y old  Male who presents with a chief complaint of SOB (28 Jun 2025 14:13)        HPI:   Patient is a  70 year old male with past medical history of  COPD (on 2L home O2), DM 2  HFrEF 30%, CAD,  ischemic cardiomyopathy s/p CRT-D, HTN, paroxysmal A-fib (status post ablation 1/14/25) on amiodarone and  Eliquis who presented to ED w c/o LESLIE and b/l  leg swelling.  Patient said it started 2 days ago and felt like his previous heart failure exacerbations. Patient denied n/v/f/c; denied HA lightheadedness; denied palpitations cough CP (now resolved on admission).  (14 Jun 2025 02:21)      PAST MEDICAL & SURGICAL HISTORY:  CHF (congestive heart failure)      Diabetes      CAD (coronary artery disease)      Chronic obstructive pulmonary disease (COPD)      HTN (hypertension)      Stented coronary artery      Dyslipidemia      GERD (gastroesophageal reflux disease)      Afib      CVA (cerebral vascular accident)      H/O heart artery stent      Heart valve replaced  aortic      History of Nissen fundoplication          FAMILY HISTORY:        Pt evaluated on rounds.  I reviewed the radiology tests and hospital record.    I reviewed previous notes on this patient.    Interval Events: No overnight events.      REVIEW OF SYSTEMS:   see HPI      OBJECTIVE:  ICU Vital Signs Last 24 Hrs  T(C): 37.6 (29 Jun 2025 04:00), Max: 37.6 (29 Jun 2025 04:00)  T(F): 99.7 (29 Jun 2025 04:00), Max: 99.7 (29 Jun 2025 04:00)  HR: 81 (29 Jun 2025 06:00) (70 - 105)  BP: 122/56 (29 Jun 2025 06:00) (97/50 - 177/77)  BP(mean): 81 (29 Jun 2025 06:00) (70 - 111)  ABP: --  ABP(mean): --  RR: 23 (29 Jun 2025 06:00) (20 - 36)  SpO2: 98% (29 Jun 2025 06:00) (96% - 100%)    O2 Parameters below as of 29 Jun 2025 06:00  Patient On (Oxygen Delivery Method): ventilator    O2 Concentration (%): 40      Mode: AC/ CMV (Assist Control/ Continuous Mandatory Ventilation), RR (machine): 20, TV (machine): 440, FiO2: 40, PEEP: 8, ITime: 1, MAP: 11, PIP: 17    06-27 @ 07:01  -  06-28 @ 07:00  --------------------------------------------------------  IN: 3810.6 mL / OUT: 1505 mL / NET: 2305.6 mL    06-28 @ 07:01 - 06-29 @ 06:27  --------------------------------------------------------  IN: 3280.7 mL / OUT: 1345 mL / NET: 1935.7 mL      CAPILLARY BLOOD GLUCOSE      POCT Blood Glucose.: 222 mg/dL (29 Jun 2025 05:01)        PHYSICAL EXAM:     · ENMT:   Airway patent,   Nasal mucosa clear.  Mouth with normal mucosa.   No thrush    · EYES:   Clear bilaterally,   pupils equal,   round and reactive to light.    · CARDIAC:   Normal rate,   regular rhythm.    Heart sounds S1, S2.   No murmurs, no rubs or gallops on auscultation  no edema        CAROTID:   normal systolic impulse  no bruits    · RESPIRATORY:   rales  normal chest expansion  no retractions or use of accessory muscles  percussion of chest demonstrates no hyperresonance or dullness    · GASTROINTESTINAL:  Abdomen soft,   non-tender,   + BS  liver/spleen not palpable    · SKIN:   Skin normal color for race,   warm, dry   No evidence of rash.    · HEME LYMPH:   no splenomegaly.  No cervical  lymphadenopathy.  no inguinal lymphadenopathy    HOSPITAL MEDICATIONS:  MEDICATIONS  (STANDING):  albuterol/ipratropium for Nebulization 3 milliLiter(s) Nebulizer every 6 hours  aMIOdarone    Tablet 200 milliGRAM(s) Oral daily  apixaban 5 milliGRAM(s) Enteral Tube every 12 hours  atorvastatin 40 milliGRAM(s) Oral at bedtime  chlorhexidine 0.12% Liquid 15 milliLiter(s) Oral Mucosa every 12 hours  chlorhexidine 2% Cloths 1 Application(s) Topical <User Schedule>  clopidogrel Tablet 75 milliGRAM(s) Enteral Tube daily  dextrose 5%. 1000 milliLiter(s) (100 mL/Hr) IV Continuous <Continuous>  dextrose 5%. 1000 milliLiter(s) (50 mL/Hr) IV Continuous <Continuous>  dextrose 50% Injectable 25 Gram(s) IV Push once  dextrose 50% Injectable 12.5 Gram(s) IV Push once  dextrose 50% Injectable 25 Gram(s) IV Push once  ezetimibe 10 milliGRAM(s) Oral daily  fentaNYL   Infusion. 0.501 MICROgram(s)/kG/Hr (4.17 mL/Hr) IV Continuous <Continuous>  insulin glargine Injectable (LANTUS) 34 Unit(s) SubCutaneous at bedtime  insulin lispro (ADMELOG) corrective regimen sliding scale   SubCutaneous every 6 hours  insulin lispro Injectable (ADMELOG) 8 Unit(s) SubCutaneous every 6 hours  meropenem  IVPB 1000 milliGRAM(s) IV Intermittent every 12 hours  meropenem  IVPB      metoprolol tartrate 25 milliGRAM(s) Oral two times a day  pantoprazole  Injectable 40 milliGRAM(s) IV Push two times a day  senna 2 Tablet(s) Oral at bedtime  vancomycin  IVPB 1250 milliGRAM(s) IV Intermittent every 24 hours    MEDICATIONS  (PRN):  acetaminophen     Tablet .. 650 milliGRAM(s) Oral every 6 hours PRN Temp greater or equal to 38C (100.4F), Mild Pain (1 - 3)  aluminum hydroxide/magnesium hydroxide/simethicone Suspension 30 milliLiter(s) Oral every 4 hours PRN Dyspepsia  dextrose Oral Gel 15 Gram(s) Oral once PRN Blood Glucose LESS THAN 70 milliGRAM(s)/deciliter  melatonin 5 milliGRAM(s) Oral at bedtime PRN Insomnia  midodrine. 5 milliGRAM(s) Oral three times a day PRN Hypotension        LABS:                        8.3    19.25 )-----------( 325      ( 28 Jun 2025 06:28 )             28.5     06-28    142  |  107  |  71[HH]  ----------------------------<  171[H]  4.3   |  25  |  1.2    Ca    8.6      28 Jun 2025 06:28  Mg     2.5     06-28    TPro  4.9[L]  /  Alb  2.6[L]  /  TBili  <0.2  /  DBili  x   /  AST  32  /  ALT  25  /  AlkPhos  58  06-28      Urinalysis Basic - ( 28 Jun 2025 06:28 )    Color: x / Appearance: x / SG: x / pH: x  Gluc: 171 mg/dL / Ketone: x  / Bili: x / Urobili: x   Blood: x / Protein: x / Nitrite: x   Leuk Esterase: x / RBC: x / WBC x   Sq Epi: x / Non Sq Epi: x / Bacteria: x      Arterial Blood Gas:  06-29 @ 04:30  7.35/55/106/30/99.4/3.9  ABG lactate: --  Arterial Blood Gas:  06-28 @ 18:12  7.38/50/69/30/97.5/3.9  ABG lactate: --  Arterial Blood Gas:  06-28 @ 04:49  7.33/57/105/30/99.4/3.3  ABG lactate: --  Arterial Blood Gas:  06-27 @ 18:44  7.34/56/134/30/99.8/3.1  ABG lactate: --      Mode: AC/ CMV (Assist Control/ Continuous Mandatory Ventilation), RR (machine): 20, TV (machine): 440, FiO2: 40, PEEP: 8, ITime: 1, MAP: 11, PIP: 17    Culture - Acid Fast - Bronchial w/Smear (collected 27 Jun 2025 13:45)  Source: Bronch Wash Bronchial Wash  Preliminary Report (28 Jun 2025 23:07):    Culture is being performed.    Culture - Acid Fast - Bronchial w/Smear (collected 27 Jun 2025 13:40)  Source: Bronchial Bronchial  Preliminary Report (28 Jun 2025 23:07):    Culture is being performed.    Culture - Bronchial (collected 27 Jun 2025 11:55)  Source: Bronch Wash None  Gram Stain (28 Jun 2025 07:03):    Moderate polymorphonuclear leukocytes seen per low power field    No squamous epithelial cells seen per low power field    No organisms seen per oil power field  Preliminary Report (28 Jun 2025 17:02):    No growth to date    Culture - Bronchial (collected 27 Jun 2025 11:55)  Source: Lavage None  Gram Stain (28 Jun 2025 06:59):    Numerous polymorphonuclear leukocytes seen per low power field    No squamous epithelial cells seen per low power field    Rare Gram Variable Rods seen per oil power field    Rare Yeast like cells seen per oil power field  Preliminary Report (28 Jun 2025 16:25):    Commensal magda consistent with body site    Culture - Sputum (collected 27 Jun 2025 09:14)  Source: Trach Asp Tracheal Aspirate  Gram Stain (29 Jun 2025 02:24):    Moderate polymorphonuclear leukocytes per low power field    Rare to few Squamous epithelial cells per low power field    Few Gram positive cocci in pairs seen per oil power field    Few Gram Negative Rods seen per oil power field    Few Yeast like cells seen per oil power field    Culture - Blood (collected 27 Jun 2025 06:06)  Source: Blood None  Preliminary Report (28 Jun 2025 17:02):    No growth at 24 hours          SARS-CoV-2: NotDetec (03 Jun 2025 12:43)  COVID-19 PCR: NotDetec (15 May 2025 09:50)  SARS-CoV-2: NotDetec (10 Dameon 2025 15:31)  SARS-CoV-2: NotDetec (02 Jan 2025 20:30)    Mode: AC/ CMV (Assist Control/ Continuous Mandatory Ventilation)  RR (machine): 20  TV (machine): 440  FiO2: 40  PEEP: 8  ITime: 1  MAP: 11  PIP: 17      ABG - ( 29 Jun 2025 04:30 )  pH, Arterial: 7.35  pH, Blood: x     /  pCO2: 55    /  pO2: 106   / HCO3: 30    / Base Excess: 3.9   /  SaO2: 99.4                RADIOLOGY: Today I personally interpreted the latest CXR and other pertinent films.

## 2025-06-30 NOTE — PROGRESS NOTE ADULT - SUBJECTIVE AND OBJECTIVE BOX
CHIEF COMPLAINT:  Patient is a 70y old Male who presents with a chief complaint of SOB (30 Jun 2025 09:48)    Primary diagnosis of CHF exacerbation        OVERNIGHT EVENTS: No events    Patient was examined at bedside, currently no active complaints.     PHYSICAL EXAMINATION:   CONSTITUTIONAL: Not in acute distress  NEUROLOGICAL: Alert and oriented x 3  EYES: Pupils equal, Round and reactive to light.  CARDIAC: Normal rate, Regular rhythm.    RESPIRATORY: No wheezing, No crackles, Normal chest expansion, Not tachypneic, No use of accessory muscles  GASTROINTESTINAL: Abdomen soft, Non-tender, No guarding, + BS  SKIN: Skin intact  GENITOURINARY: WNL  EXTREMITIES:  2+ Peripheral Pulses, No clubbing, cyanosis, or edema    PAST MEDICAL & SURGICAL HISTORY  CHF (congestive heart failure)    Diabetes    CAD (coronary artery disease)    Chronic obstructive pulmonary disease (COPD)    HTN (hypertension)    Stented coronary artery    Dyslipidemia    GERD (gastroesophageal reflux disease)    Afib    CVA (cerebral vascular accident)    H/O heart artery stent    Heart valve replaced  aortic    History of Nissen fundoplication        SOCIAL HISTORY:  Social History:      ALLERGIES:  Bactrim (Unknown)  sulfa drugs (Unknown)      VITALS:   T(F): 99.5  HR: 91  BP: 149/73  RR: 31  SpO2: 99%    LABS:                        8.5    19.44 )-----------( 506      ( 30 Jun 2025 06:20 )             29.3     06-30    145  |  109  |  71[HH]  ----------------------------<  234[H]  4.8   |  26  |  1.1    Ca    8.6      30 Jun 2025 06:20  Phos  3.5     06-29  Mg     2.6     06-30    TPro  5.3[L]  /  Alb  2.7[L]  /  TBili  <0.2  /  DBili  x   /  AST  29  /  ALT  30  /  AlkPhos  80  06-30      Urinalysis Basic - ( 30 Jun 2025 06:20 )    Color: x / Appearance: x / SG: x / pH: x  Gluc: 234 mg/dL / Ketone: x  / Bili: x / Urobili: x   Blood: x / Protein: x / Nitrite: x   Leuk Esterase: x / RBC: x / WBC x   Sq Epi: x / Non Sq Epi: x / Bacteria: x      ABG - ( 30 Jun 2025 05:08 )  pH, Arterial: 7.37  pH, Blood: x     /  pCO2: 51    /  pO2: 84    / HCO3: 30    / Base Excess: 3.1   /  SaO2: 99.6                  Culture - Acid Fast - Sputum w/Smear (collected 27 Jun 2025 21:50)  Source: Trach Asp Tracheal Aspirate            HOME MEDICATIONS:  atorvastatin 40 mg oral tablet: 1 tab(s) orally once a day  clopidogrel 75 mg oral tablet: 1 tab(s) orally once a day  ezetimibe 10 mg oral tablet: 1 orally once a day  famotidine 40 mg oral tablet: 1 tab(s) orally once a day  fenofibrate 145 mg oral tablet: 1 orally once a day  melatonin 5 mg oral tablet: 1 tab(s) orally once a day (at bedtime)  metFORMIN 500 mg oral tablet: 1 tab(s) orally 2 times a day  metoprolol succinate 50 mg oral tablet, extended release: 1 tab(s) orally once a day  Soaanz 20 mg oral tablet: 1 tab(s) orally once a day  spironolactone 25 mg oral tablet: 1 tab(s) orally every 24 hours  Tresiba 100 units/mL subcutaneous solution: 34 unit(s) subcutaneous once a day (in the morning)      MEDICATIONS:    STANDING MEDICATIONS  albuterol/ipratropium for Nebulization 3 milliLiter(s) Nebulizer every 6 hours  aMIOdarone    Tablet 200 milliGRAM(s) Oral daily  apixaban 5 milliGRAM(s) Enteral Tube every 12 hours  atorvastatin 40 milliGRAM(s) Oral at bedtime  bumetanide Injectable 1 milliGRAM(s) IV Push two times a day  chlorhexidine 0.12% Liquid 15 milliLiter(s) Oral Mucosa every 12 hours  chlorhexidine 2% Cloths 1 Application(s) Topical <User Schedule>  clopidogrel Tablet 75 milliGRAM(s) Enteral Tube daily  dextrose 5%. 1000 milliLiter(s) IV Continuous <Continuous>  dextrose 5%. 1000 milliLiter(s) IV Continuous <Continuous>  dextrose 50% Injectable 25 Gram(s) IV Push once  dextrose 50% Injectable 12.5 Gram(s) IV Push once  dextrose 50% Injectable 25 Gram(s) IV Push once  ezetimibe 10 milliGRAM(s) Oral daily  fentaNYL   Infusion. 0.501 MICROgram(s)/kG/Hr IV Continuous <Continuous>  insulin glargine Injectable (LANTUS) 34 Unit(s) SubCutaneous at bedtime  insulin lispro (ADMELOG) corrective regimen sliding scale   SubCutaneous every 6 hours  insulin lispro Injectable (ADMELOG) 8 Unit(s) SubCutaneous every 6 hours  meropenem  IVPB 1000 milliGRAM(s) IV Intermittent every 12 hours  meropenem  IVPB      metoprolol tartrate 25 milliGRAM(s) Oral two times a day  pantoprazole  Injectable 40 milliGRAM(s) IV Push two times a day  senna 2 Tablet(s) Oral at bedtime  vancomycin  IVPB 1250 milliGRAM(s) IV Intermittent every 24 hours    PRN MEDICATIONS  acetaminophen     Tablet .. 650 milliGRAM(s) Oral every 6 hours PRN  aluminum hydroxide/magnesium hydroxide/simethicone Suspension 30 milliLiter(s) Oral every 4 hours PRN  dextrose Oral Gel 15 Gram(s) Oral once PRN  melatonin 5 milliGRAM(s) Oral at bedtime PRN  midodrine. 5 milliGRAM(s) Oral three times a day PRN

## 2025-06-30 NOTE — PROGRESS NOTE ADULT - ASSESSMENT
This is a 70 year old male with past medical history of  COPD (on 2L home O2), DM 2  HFrEF 30%, CAD,  ischemic cardiomyopathy s/p CRT-D, HTN, paroxysmal A-fib (status post ablation 1/14/25) on amiodarone and  Eliquis who presented to ED w c/o LESLIE and b/l  leg swelling.    Acute on chronic hypoxic respiratory failure   Acute on chronic HFrEF decompensated heart failure EF 31%   VAP   Chronic leukocytosis   Anemia  Splenic infarct  Possible colitis on CT, possible ischemic colitis?  Acute pulmonary edema  COPD, not in active exacerbation, on home O2   pulm HTN multifactorial secondary to cardiomyopathy reduced EF, likely LAW, baseline COPD  HTN, HLD, CAD s/p MANN to mid LAD in 2021, ICM s/p AICD, AF, Bioprosthetic Aortic valve  CKD 3, DM, Lung nodule    -poorly weaning, daily SAT, vent and sedation management per CC  -S/p BAL 6/27- Thick copious secretions from BL lung fields reported. Cultures Normal respiratory magda.   -IV Vanc dosing as per pharmacy protocol.  -IV Meropenem 1000mg q 12 hours. (E/D all abx 7/1)   -bumex 1mg bid per CC  -cardio f/u  -trend wbc and temps    no family at bedside today  will prepare to reinstate dnr as per [facility] form reaffirmed on admission by patient, once son notified-discussed with palliative who will f/u with son tomorrow

## 2025-06-30 NOTE — PROGRESS NOTE ADULT - SUBJECTIVE AND OBJECTIVE BOX
MONIQUE LYONS  70y, Male    LOS  16d    INTERVAL EVENTS/HPI  - No acute events overnight  - WBC Count: 19.44 (06-30-25 @ 06:20)  WBC Count: 18.14 (06-29-25 @ 06:24)  - Creatinine: 1.3 (06-29-25 @ 06:24)    REVIEW OF SYSTEMS: cannot obtain further history from the patient secondary to altered mental status or sedation      ANTIMICROBIALS:   meropenem  IVPB 1000 every 12 hours  meropenem  IVPB    vancomycin  IVPB 1250 every 24 hours      OTHER MEDS: MEDICATIONS  (STANDING):  acetaminophen     Tablet .. 650 every 6 hours PRN  albuterol/ipratropium for Nebulization 3 every 6 hours  aluminum hydroxide/magnesium hydroxide/simethicone Suspension 30 every 4 hours PRN  aMIOdarone    Tablet 200 daily  apixaban 5 every 12 hours  atorvastatin 40 at bedtime  clopidogrel Tablet 75 daily  dextrose 50% Injectable 25 once  dextrose 50% Injectable 12.5 once  dextrose 50% Injectable 25 once  dextrose Oral Gel 15 once PRN  ezetimibe 10 daily  fentaNYL   Infusion. 0.501 <Continuous>  insulin glargine Injectable (LANTUS) 34 at bedtime  insulin lispro (ADMELOG) corrective regimen sliding scale  every 6 hours  insulin lispro Injectable (ADMELOG) 8 every 6 hours  melatonin 5 at bedtime PRN  metoprolol tartrate 25 two times a day  midodrine. 5 three times a day PRN  pantoprazole  Injectable 40 two times a day  senna 2 at bedtime      Vital Signs Last 24 Hrs  T(F): 99.5 (06-30-25 @ 07:05), Max: 101.9 (06-23-25 @ 23:00)    Vital Signs Last 24 Hrs  HR: 76 (06-30-25 @ 07:00) (70 - 107)  BP: 103/55 (06-30-25 @ 07:00) (96/55 - 174/84)  RR: 22 (06-30-25 @ 07:05)  SpO2: 97% (06-30-25 @ 07:05) (94% - 99%)  Wt(kg): --    EXAM:  GENERAL: On MV  HEAD: No head lesions  NECK: Supple  CHEST/LUNG: Shallow breath sounds   HEART: S1 S2  ABDOMEN: Soft, nontender +Mcmahon  EXTREMITIES: No clubbing  MSK: +1 edema   SKIN: No rashes or lesions, no superficial thrombophlebitis    Labs:                        8.5    19.44 )-----------( 506      ( 30 Jun 2025 06:20 )             29.3     06-29    146  |  109  |  72[HH]  ----------------------------<  156[H]  4.6   |  26  |  1.3    Ca    8.6      29 Jun 2025 06:24  Phos  3.5     06-29  Mg     2.6     06-30    TPro  5.3[L]  /  Alb  2.7[L]  /  TBili  <0.2  /  DBili  x   /  AST  40  /  ALT  37  /  AlkPhos  80  06-29      WBC Trend:  WBC Count: 19.44 (06-30-25 @ 06:20)  WBC Count: 18.14 (06-29-25 @ 06:24)  WBC Count: 19.25 (06-28-25 @ 06:28)  WBC Count: 24.15 (06-27-25 @ 06:06)      Creatine Trend:  Creatinine: 1.3 (06-29)  Creatinine: 1.2 (06-28)  Creatinine: 1.4 (06-27)  Creatinine: 1.4 (06-26)      Liver Biochemical Testing Trend:  Alanine Aminotransferase (ALT/SGPT): 37 (06-29)  Alanine Aminotransferase (ALT/SGPT): 25 (06-28)  Alanine Aminotransferase (ALT/SGPT): 25 (06-27)  Alanine Aminotransferase (ALT/SGPT): 27 (06-26)  Alanine Aminotransferase (ALT/SGPT): 33 (06-25)  Aspartate Aminotransferase (AST/SGOT): 40 (06-29-25 @ 06:24)  Aspartate Aminotransferase (AST/SGOT): 32 (06-28-25 @ 06:28)  Aspartate Aminotransferase (AST/SGOT): 31 (06-27-25 @ 06:06)  Aspartate Aminotransferase (AST/SGOT): 23 (06-26-25 @ 05:54)  Aspartate Aminotransferase (AST/SGOT): 23 (06-25-25 @ 05:59)  Bilirubin Total: <0.2 (06-29)  Bilirubin Total: <0.2 (06-28)  Bilirubin Total: <0.2 (06-27)  Bilirubin Total: <0.2 (06-26)  Bilirubin Total: <0.2 (06-25)      Trend LDH  06-15-25 @ 06:45  375[H]      Urinalysis Basic - ( 29 Jun 2025 06:24 )    Color: x / Appearance: x / SG: x / pH: x  Gluc: 156 mg/dL / Ketone: x  / Bili: x / Urobili: x   Blood: x / Protein: x / Nitrite: x   Leuk Esterase: x / RBC: x / WBC x   Sq Epi: x / Non Sq Epi: x / Bacteria: x        MICROBIOLOGY:  Vancomycin Level, Random: 14.4 (06-29 @ 07:00)  Vancomycin Level, Trough: 14.5 (06-20 @ 06:16)  Vancomycin Level, Random: 11.4 (06-19 @ 06:27)  Vancomycin Level, Trough: 7.0 (06-18 @ 18:00)  Vancomycin Level, Trough: 8.1 (06-17 @ 11:01)  Vancomycin Level, Random: 14.5 (06-15 @ 06:45)    Male    Culture - Acid Fast - Sputum w/Smear (collected 27 Jun 2025 21:50)  Source: Trach Asp Tracheal Aspirate    Culture - Acid Fast - Bronchial w/Smear (collected 27 Jun 2025 13:45)  Source: Bronch Wash Bronchial Wash  Preliminary Report:    Culture is being performed.    Culture - Acid Fast - Bronchial w/Smear (collected 27 Jun 2025 13:40)  Source: Bronchial Bronchial  Preliminary Report:    Culture is being performed.    Culture - Bronchial (collected 27 Jun 2025 11:55)  Source: Bronch Wash None  Final Report:    Commensal magda consistent with body site    Culture - Bronchial (collected 27 Jun 2025 11:55)  Source: Lavage None  Final Report:    Commensal magda consistent with body site    Culture - Sputum (collected 27 Jun 2025 09:14)  Source: Trach Asp Tracheal Aspirate  Preliminary Report:    Commensal magda consistent with body site    Culture - Blood (collected 27 Jun 2025 06:06)  Source: Blood None  Preliminary Report:    No growth at 48 Hours    Culture - Acid Fast - Sputum w/Smear (collected 24 Jun 2025 09:00)  Source: Trach Asp Tracheal Aspirate  Preliminary Report:    Culture is being performed.    Culture - Blood (collected 16 Jun 2025 06:28)  Source: Blood Blood  Final Report:    No growth at 5 days    Culture - Sputum (collected 15 Kleber 2025 16:44)  Source: Sputum Sputum  Final Report:    Commensal magda consistent with body site      HIV-1/2 Combo Result: Nonreact (06-16-25 @ 06:28)  HIV-1/2 Combo Result: Nonreact (01-04-25 @ 05:58)                      COVID-19 PCR: NotDetec (05-15-25 @ 09:50)                    Blood Gas Arterial, Lactate: 0.6 (06-30 @ 05:08)  Blood Gas Arterial, Lactate: 0.8 (06-29 @ 17:09)  Blood Gas Arterial, Lactate: 0.5 (06-29 @ 04:30)  Blood Gas Arterial, Lactate: 0.9 (06-28 @ 18:12)        RADIOLOGY & ADDITIONAL TESTS:  I have personally reviewed the relevant images.   CXR  Xray Chest 1 View- PORTABLE-Urgent:   ACC: [id number] EXAM:  XR CHEST PORTABLE URGENT 1V   ORDERED BY: YOLA DUKE     PROCEDURE DATE:  06/28/2025          INTERPRETATION:  Clinical History / Reason for exam: et tube advanced    Comparison : Chest radiograph: June 28 2025    Technique/Positioning: Frontal view of the chest    Findings/  impression:    Support devices: Left AICD, endotracheal and enteric tubes    Lung parenchyma/Pleura: Stable bilateral opacities and effusions.    Cardiac/mediastinum/hilum: No significant change    Skeleton/soft tissues: No significant change.    --- End of Report ---            PRESTON RODRÍGUEZ MD; Attending Radiologist  This document has been electronically signed. Jun 29 2025 11:15PM (06-28-25 @ 18:36)      CT        WEIGHT  Weight (kg): 83.3 (06-14-25 @ 08:00)      All available historical records have been reviewed

## 2025-06-30 NOTE — PROGRESS NOTE ADULT - SUBJECTIVE AND OBJECTIVE BOX
Patient is a 70y old  Male who presents with a chief complaint of SOB (30 Jun 2025 09:27)        Over Night Events: No acute events noted, off sedation, SAT         ROS:     All ROS are negative except HPI         PHYSICAL EXAM    ICU Vital Signs Last 24 Hrs  T(C): 37.5 (30 Jun 2025 07:05), Max: 37.5 (29 Jun 2025 11:04)  T(F): 99.5 (30 Jun 2025 07:05), Max: 99.5 (29 Jun 2025 11:04)  HR: 76 (30 Jun 2025 07:00) (70 - 107)  BP: 103/55 (30 Jun 2025 07:00) (96/55 - 174/84)  BP(mean): 72 (30 Jun 2025 07:00) (71 - 120)  ABP: --  ABP(mean): --  RR: 22 (30 Jun 2025 07:05) (19 - 36)  SpO2: 97% (30 Jun 2025 07:05) (94% - 99%)        CONSTITUTIONAL:  NAD    ENT:   Airway patent,   Mouth with normal mucosa.     EYES:   Pupils equal,   Round and reactive to light.    CARDIAC:   Normal rate,   Regular rhythm.      Vascular:  Normal systolic impulse  No Carotid bruits    RESPIRATORY:   No wheezing  Bilateral BS    GASTROINTESTINAL:  Abdomen soft,   Non-tender,   No guarding,   + BS    MUSCULOSKELETAL:   Range of motion is not limited,  No clubbing, cyanosis    NEUROLOGICAL:   Intubated    No motor  deficits.    SKIN:   Skin normal color for race,   Warm and dry and intact.   No evidence of rash.        06-29-25 @ 07:01  -  06-30-25 @ 07:00  --------------------------------------------------------  IN:    Enteral Tube Flush: 950 mL    FentaNYL: 422.4 mL    Free Water: 300 mL    IV PiggyBack: 300 mL    Peptamen A.F.: 1314 mL  Total IN: 3286.4 mL    OUT:    Indwelling Catheter - Urethral (mL): 1660 mL  Total OUT: 1660 mL    Total NET: 1626.4 mL      06-30-25 @ 07:01  -  06-30-25 @ 09:48  --------------------------------------------------------  IN:  Total IN: 0 mL    OUT:    Indwelling Catheter - Urethral (mL): 75 mL  Total OUT: 75 mL    Total NET: -75 mL          LABS:                            8.5    19.44 )-----------( 506      ( 30 Jun 2025 06:20 )             29.3                                               06-30    145  |  109  |  71[HH]  ----------------------------<  234[H]  4.8   |  26  |  1.1    Ca    8.6      30 Jun 2025 06:20  Phos  3.5     06-29  Mg     2.6     06-30    TPro  5.3[L]  /  Alb  2.7[L]  /  TBili  <0.2  /  DBili  x   /  AST  29  /  ALT  30  /  AlkPhos  80  06-30                                             Urinalysis Basic - ( 30 Jun 2025 06:20 )    Color: x / Appearance: x / SG: x / pH: x  Gluc: 234 mg/dL / Ketone: x  / Bili: x / Urobili: x   Blood: x / Protein: x / Nitrite: x   Leuk Esterase: x / RBC: x / WBC x   Sq Epi: x / Non Sq Epi: x / Bacteria: x                                                  LIVER FUNCTIONS - ( 30 Jun 2025 06:20 )  Alb: 2.7 g/dL / Pro: 5.3 g/dL / ALK PHOS: 80 U/L / ALT: 30 U/L / AST: 29 U/L / GGT: x                                                  Culture - Acid Fast - Sputum w/Smear (collected 27 Jun 2025 21:50)  Source: Trach Asp Tracheal Aspirate    Culture - Acid Fast - Bronchial w/Smear (collected 27 Jun 2025 13:45)  Source: Bronch Wash Bronchial Wash  Preliminary Report (28 Jun 2025 23:07):    Culture is being performed.    Culture - Acid Fast - Bronchial w/Smear (collected 27 Jun 2025 13:40)  Source: Bronchial Bronchial  Preliminary Report (28 Jun 2025 23:07):    Culture is being performed.    Culture - Bronchial (collected 27 Jun 2025 11:55)  Source: Lavage None  Gram Stain (28 Jun 2025 06:59):    Numerous polymorphonuclear leukocytes seen per low power field    No squamous epithelial cells seen per low power field    Rare Gram Variable Rods seen per oil power field    Rare Yeast like cells seen per oil power field  Final Report (29 Jun 2025 07:53):    Commensal magda consistent with body site    Culture - Bronchial (collected 27 Jun 2025 11:55)  Source: Bronch Wash None  Gram Stain (28 Jun 2025 07:03):    Moderate polymorphonuclear leukocytes seen per low power field    No squamous epithelial cells seen per low power field    No organisms seen per oil power field  Final Report (29 Jun 2025 07:54):    Commensal magda consistent with body site                                                   Mode: AC/ CMV (Assist Control/ Continuous Mandatory Ventilation)  RR (machine): 20  TV (machine): 440  FiO2: 40  PEEP: 8  ITime: 1  MAP: 8  PIP: 19                                      ABG - ( 30 Jun 2025 05:08 )  pH, Arterial: 7.37  pH, Blood: x     /  pCO2: 51    /  pO2: 84    / HCO3: 30    / Base Excess: 3.1   /  SaO2: 99.6          MEDICATIONS  (STANDING):  albuterol/ipratropium for Nebulization 3 milliLiter(s) Nebulizer every 6 hours  aMIOdarone    Tablet 200 milliGRAM(s) Oral daily  apixaban 5 milliGRAM(s) Enteral Tube every 12 hours  atorvastatin 40 milliGRAM(s) Oral at bedtime  chlorhexidine 0.12% Liquid 15 milliLiter(s) Oral Mucosa every 12 hours  chlorhexidine 2% Cloths 1 Application(s) Topical <User Schedule>  clopidogrel Tablet 75 milliGRAM(s) Enteral Tube daily  dextrose 5%. 1000 milliLiter(s) (100 mL/Hr) IV Continuous <Continuous>  dextrose 5%. 1000 milliLiter(s) (50 mL/Hr) IV Continuous <Continuous>  dextrose 50% Injectable 25 Gram(s) IV Push once  dextrose 50% Injectable 12.5 Gram(s) IV Push once  dextrose 50% Injectable 25 Gram(s) IV Push once  ezetimibe 10 milliGRAM(s) Oral daily  fentaNYL   Infusion. 0.501 MICROgram(s)/kG/Hr (4.17 mL/Hr) IV Continuous <Continuous>  insulin glargine Injectable (LANTUS) 34 Unit(s) SubCutaneous at bedtime  insulin lispro (ADMELOG) corrective regimen sliding scale   SubCutaneous every 6 hours  insulin lispro Injectable (ADMELOG) 8 Unit(s) SubCutaneous every 6 hours  meropenem  IVPB      meropenem  IVPB 1000 milliGRAM(s) IV Intermittent every 12 hours  metoprolol tartrate 25 milliGRAM(s) Oral two times a day  pantoprazole  Injectable 40 milliGRAM(s) IV Push two times a day  senna 2 Tablet(s) Oral at bedtime  vancomycin  IVPB 1250 milliGRAM(s) IV Intermittent every 24 hours    MEDICATIONS  (PRN):  acetaminophen     Tablet .. 650 milliGRAM(s) Oral every 6 hours PRN Temp greater or equal to 38C (100.4F), Mild Pain (1 - 3)  aluminum hydroxide/magnesium hydroxide/simethicone Suspension 30 milliLiter(s) Oral every 4 hours PRN Dyspepsia  dextrose Oral Gel 15 Gram(s) Oral once PRN Blood Glucose LESS THAN 70 milliGRAM(s)/deciliter  melatonin 5 milliGRAM(s) Oral at bedtime PRN Insomnia  midodrine. 5 milliGRAM(s) Oral three times a day PRN Hypotension      New X-rays reviewed:                                                                                  ECHO

## 2025-06-30 NOTE — PROGRESS NOTE ADULT - ASSESSMENT
This is a 70 year old male with past medical history of  COPD (on 2L home O2), DM 2  HFrEF 30%, CAD,  ischemic cardiomyopathy s/p CRT-D, HTN, paroxysmal A-fib (status post ablation 1/14/25) on amiodarone and  Eliquis who presented to ED w c/o LESLIE and b/l  leg swelling.    IMPRESSION  #Chronic Leukocytosis- Now Worsened- Possible leukemoid reaction  #Acute on chronic hypoxic respiratory failure  #Heart failure with reduced EF, Acute on chronic exacerbation  #Large Bilateral pleural effusions/Pulmonary edema- Improved on recent CT chest  #Anemia- Possible ischemic in nature resulting in loose stools  #CT abdomen noted for colitis- Suspect Ischemic colitis.   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  #Obesity BMI (kg/m2): 32.5, 28.7, 29.6, 30.1    Recently Enterovirus Positive Discharged on PO steroids Vantin+Doxy    Covid/Flu/RSV negative  MRSA nares positive  , Fungitell 108 (very mildly elevated)   Blood cultures NGTD  Sputum cultures Normal Respiratory magda  C.diff negative     RECOMMENDATIONS  -Finished broad spectrum abx 6/23  -Unclear cause of fevers- Likely drug induced vs pleural effusions.  -Monitor for fever curve. Trend WBC  -S/p BAL 6/27- Thick copious secretions from BL lung fields reported. Cultures Normal respiratory magda.   -IV Vanc dosing as per pharmacy protocol.  -IV Meropenem 1000mg q 12 hours. (E/D all abx 7/1)   -Cardiology follow up.  -Off loading to prevent pressure sores and preventive measures to avoid aspiration. TOV. GOC. Recurrent admissions expected.     If any questions, please send a message or call on [employer] Teams  Please continue to update ID with any pertinent new laboratory or radiographic findings.    Benigno Pond M.D  Infectious Diseases Attending/   [employer] at [facility]

## 2025-06-30 NOTE — PROGRESS NOTE ADULT - ASSESSMENT
IMPRESSION:    Acute on chronic HFrEF decompensated heart failure EF 31%   VAP   Anemia  Acute pulmonary edema  COPD, not in active exacerbation  pulm HTN multifactorial secondary to cardiomyopathy reduced EF, likely LAW, baseline COPD  chronic hypoxia from all the above  Small pleural effusions due to pulmonary edema      PLAN:    CNS: Daily SAT, off PRECEDEX and watch fever curve negative    HEENT: Oral care    PULMONARY:  HOB @ 45 degrees. CXR with BL effusions, Duonebs q 6 hrs, Vent Changes: monitor for vent synchrony, f/u Peak and Plateau pressures, allow permissive hypercapnia, CXR today       CARDIOVASCULAR: TTE noted , volume contraction as tolerated, Cardiology follow up, Bumex 1mg BID     GI: GI prophylaxis. Feeding. trend Hb stable, f/u CT abdomen w/ no active bleed, 250 q 6     RENAL:  Follow up lytes. Correct as needed, Senna and Miralax for BM, f/u nephro eval noted     INFECTIOUS DISEASE: Follow up cultures, MRSA +, finished course of vancomycin and cefepime, f/u ID reccs, trend WBC, f/u bronch cultures, on vanco and merem     HEMATOLOGICAL: On AC, okay per GI, f/u Us duplex LE with no DVT     ENDOCRINE:  Follow up FS. Insulin protocol if needed.    MUSCULOSKELETAL: off loading     Palliative follow up  Poor prognosis   MICU monitoring

## 2025-06-30 NOTE — PROGRESS NOTE ADULT - SUBJECTIVE AND OBJECTIVE BOX
MONIQUE LYONS 70y Male  MRN#: [id number]     Hospital Day: 16d      SUBJECTIVE  No acute events overnight, pt seen and examined this morning.                                          ----------------------------------------------------------  OBJECTIVE  PAST MEDICAL & SURGICAL HISTORY  CHF (congestive heart failure)    Diabetes    CAD (coronary artery disease)    Chronic obstructive pulmonary disease (COPD)    HTN (hypertension)    Stented coronary artery    Dyslipidemia    GERD (gastroesophageal reflux disease)    Afib    CVA (cerebral vascular accident)    H/O heart artery stent    Heart valve replaced  aortic    History of Nissen fundoplication                                              -----------------------------------------------------------  ALLERGIES:  Bactrim (Unknown)  sulfa drugs (Unknown)                                            ------------------------------------------------------------    HOME MEDICATIONS  Home Medications:  atorvastatin 40 mg oral tablet: 1 tab(s) orally once a day (25 May 2025 03:34)  clopidogrel 75 mg oral tablet: 1 tab(s) orally once a day (25 May 2025 03:34)  ezetimibe 10 mg oral tablet: 1 orally once a day (25 May 2025 03:37)  famotidine 40 mg oral tablet: 1 tab(s) orally once a day (25 May 2025 03:37)  fenofibrate 145 mg oral tablet: 1 orally once a day (25 May 2025 03:37)  melatonin 5 mg oral tablet: 1 tab(s) orally once a day (at bedtime) (25 May 2025 03:37)  metFORMIN 500 mg oral tablet: 1 tab(s) orally 2 times a day (25 May 2025 03:37)  metoprolol succinate 50 mg oral tablet, extended release: 1 tab(s) orally once a day (25 May 2025 03:37)  Soaanz 20 mg oral tablet: 1 tab(s) orally once a day (06 Jun 2025 15:53)  spironolactone 25 mg oral tablet: 1 tab(s) orally every 24 hours (25 May 2025 04:25)  Tresiba 100 units/mL subcutaneous solution: 34 unit(s) subcutaneous once a day (in the morning) (25 May 2025 04:25)                           MEDICATIONS:  STANDING MEDICATIONS  albuterol/ipratropium for Nebulization 3 milliLiter(s) Nebulizer every 6 hours  aMIOdarone    Tablet 200 milliGRAM(s) Oral daily  apixaban 5 milliGRAM(s) Enteral Tube every 12 hours  atorvastatin 40 milliGRAM(s) Oral at bedtime  bumetanide Injectable 1 milliGRAM(s) IV Push two times a day  chlorhexidine 0.12% Liquid 15 milliLiter(s) Oral Mucosa every 12 hours  chlorhexidine 2% Cloths 1 Application(s) Topical <User Schedule>  clopidogrel Tablet 75 milliGRAM(s) Enteral Tube daily  dextrose 5%. 1000 milliLiter(s) IV Continuous <Continuous>  dextrose 5%. 1000 milliLiter(s) IV Continuous <Continuous>  dextrose 50% Injectable 25 Gram(s) IV Push once  dextrose 50% Injectable 12.5 Gram(s) IV Push once  dextrose 50% Injectable 25 Gram(s) IV Push once  ezetimibe 10 milliGRAM(s) Oral daily  fentaNYL   Infusion. 0.501 MICROgram(s)/kG/Hr IV Continuous <Continuous>  insulin glargine Injectable (LANTUS) 34 Unit(s) SubCutaneous at bedtime  insulin lispro (ADMELOG) corrective regimen sliding scale   SubCutaneous every 6 hours  insulin lispro Injectable (ADMELOG) 8 Unit(s) SubCutaneous every 6 hours  meropenem  IVPB 1000 milliGRAM(s) IV Intermittent every 12 hours  meropenem  IVPB      metoprolol tartrate 25 milliGRAM(s) Oral two times a day  pantoprazole  Injectable 40 milliGRAM(s) IV Push two times a day  senna 2 Tablet(s) Oral at bedtime  vancomycin  IVPB 1250 milliGRAM(s) IV Intermittent every 24 hours    PRN MEDICATIONS  acetaminophen     Tablet .. 650 milliGRAM(s) Oral every 6 hours PRN  aluminum hydroxide/magnesium hydroxide/simethicone Suspension 30 milliLiter(s) Oral every 4 hours PRN  dextrose Oral Gel 15 Gram(s) Oral once PRN  melatonin 5 milliGRAM(s) Oral at bedtime PRN  midodrine. 5 milliGRAM(s) Oral three times a day PRN                                            ------------------------------------------------------------  VITAL SIGNS: Last 24 Hours  T(C): 37.5 (30 Jun 2025 15:00), Max: 37.5 (30 Jun 2025 07:05)  T(F): 99.5 (30 Jun 2025 15:00), Max: 99.5 (30 Jun 2025 07:05)  HR: 95 (30 Jun 2025 17:45) (70 - 101)  BP: 177/73 (30 Jun 2025 17:45) (96/55 - 177/73)  BP(mean): 105 (30 Jun 2025 17:45) (71 - 106)  RR: 28 (30 Jun 2025 17:45) (19 - 34)  SpO2: 99% (30 Jun 2025 17:45) (94% - 100%)      06-29-25 @ 07:01  -  06-30-25 @ 07:00  --------------------------------------------------------  IN: 3286.4 mL / OUT: 1660 mL / NET: 1626.4 mL    06-30-25 @ 07:01 - 06-30-25 @ 17:56  --------------------------------------------------------  IN: 1379 mL / OUT: 1340 mL / NET: 39 mL                                             --------------------------------------------------------------  LABS:                        8.5    19.44 )-----------( 506      ( 30 Jun 2025 06:20 )             29.3     06-30    145  |  109  |  71[HH]  ----------------------------<  234[H]  4.8   |  26  |  1.1    Ca    8.6      30 Jun 2025 06:20  Phos  3.5     06-29  Mg     2.6     06-30    TPro  5.3[L]  /  Alb  2.7[L]  /  TBili  <0.2  /  DBili  x   /  AST  29  /  ALT  30  /  AlkPhos  80  06-30      Urinalysis Basic - ( 30 Jun 2025 06:20 )    Color: x / Appearance: x / SG: x / pH: x  Gluc: 234 mg/dL / Ketone: x  / Bili: x / Urobili: x   Blood: x / Protein: x / Nitrite: x   Leuk Esterase: x / RBC: x / WBC x   Sq Epi: x / Non Sq Epi: x / Bacteria: x      ABG - ( 30 Jun 2025 05:08 )  pH, Arterial: 7.37  pH, Blood: x     /  pCO2: 51    /  pO2: 84    / HCO3: 30    / Base Excess: 3.1   /  SaO2: 99.6                    Culture - Acid Fast - Sputum w/Smear (collected 27 Jun 2025 21:50)  Source: Trach Asp Tracheal Aspirate                                                    -------------------------------------------------------------  RADIOLOGY:  CXR  Xray Chest 1 View- PORTABLE-Urgent:   ACC: [id number] EXAM:  XR CHEST PORTABLE URGENT 1V   ORDERED BY: MATTHEW ALANIZ     PROCEDURE DATE:  06/30/2025          INTERPRETATION:  CLINICAL INDICATION:  intubated    COMPARISON: Chest radiograph 6/29/2025.    TECHNIQUE: AP radiograph of the chest. Patient is rotated.    FINDINGS:    Support devices: Endotracheal tube terminating above the oscar. There is   an enteric tube coursing below the diaphragm with the tip not imaged. ICD   on left side of chest, unchanged position.    Cardiac/mediastinum/hilum: Unchanged.    Lung parenchyma/Pleura: Bilateral opacities, unchanged on the right, and   slightly increased on the left compared to prior. No pneumothorax.    Skeleton/soft tissues: Unchanged.    IMPRESSION:    Bilateral opacities, unchanged on the right, and slightly increased on   the left compared to prior.    --- End of Report ---          MALISSA BANG MD; Resident Radiologist  This document has been electronically signed.  LEONARDA CAMP MD; Attending Radiologist  This document has been electronically signed. Jun 30 2025 11:17AM (06-30-25 @ 10:53)  Xray Chest 1 View- PORTABLE-Urgent:   ACC: [id number] EXAM:  XR CHEST PORTABLE URGENT 1V   ORDERED BY: YOLA DUKE     PROCEDURE DATE:  06/28/2025          INTERPRETATION:  Clinical History / Reason for exam: et tube advanced    Comparison : Chest radiograph: June 28 2025    Technique/Positioning: Frontal view of the chest    Findings/  impression:    Support devices: Left AICD, endotracheal and enteric tubes    Lung parenchyma/Pleura: Stable bilateral opacities and effusions.    Cardiac/mediastinum/hilum: No significant change    Skeleton/soft tissues: No significant change.    --- End of Report ---            PRESTON RODRÍGUEZ MD; Attending Radiologist  This document has been electronically signed. Jun 29 2025 11:15PM (06-28-25 @ 18:36)      CT                                            --------------------------------------------------------------    PHYSICAL EXAM:  GENERAL: NAD, lying in bed, intubated/sedated  CHEST/LUNG: vent sounds  HEART: Regular rate and rhythm; No murmurs, rubs, or gallops  ABDOMEN: Soft, nontender, nondistended  EXTREMITIES:  No clubbing, cyanosis, or edema

## 2025-06-30 NOTE — PROGRESS NOTE ADULT - ASSESSMENT
# Acute pulmonary edema  # Small pleural effusions due to pulmonary edema  # Acute on chronic HFrEF decompensated heart failure EF 31%   # Chronic Hypoxia 2/2 Pulmonary HTN multifactorial secondary to cardiomyopathy reduced EF, likely LAW, baseline COPD  - HOB @ 45 degrees  - Duonebs q 6 hrs,  - Volume contraction as tolerated  - Cardiology follow up  - Bumex 1mg BID     # VAP   - Follow up cultures  - MRSA +  - Finished course of vancomycin and cefepime  - Vancomycin and Meropenem till 7/1    Miscellaneous:  DVT prophylaxis: Eliquis  Bowel  - Feeds: NG tube  - Prophylaxis: PPI  - Regimen: None  Activity: Bedbound  Code status: Full

## 2025-06-30 NOTE — PROGRESS NOTE ADULT - CRITICAL CARE ATTENDING COMMENT
Attending comments:  -I have personally seen and examined this patient.  I have reviewed all pertinent clinical information and reviewed all relevant imaging and diagnostic studies personally.   -The patient's injury acutely impairs one or more vital organ systems, and there is a high probability of imminent or life threatening deterioration in the patient's condition. The care of this patient requires complex medical decision making in the field of Infectious Diseases and extensive interpretation of laboratory, microbiological and radiographic data.
This patient is critically ill due to the following:  * Multiple organ failure requiring complex decision-making, and there is a high probability of imminent or life-threatening deterioration in the patient’s condition  * The patient required frequent reassessments and monitoring to ensure response to interventions and therapies.    Critical care time includes time spent evaluating and treating the patient's acute illness as well as time spent reviewing labs, radiology,  and discussing the case with a multidisciplinary team in an effort to prevent further life threatening deterioration or end organ damage. This time is independent of any procedures performed.
This patient is critically ill due to the following:  * Hemodynamic instability requiring titration of vasopressors or other vasoactive agents  * Respiratory instability requiring management of invasive ventilation  * Multiple organ failure requiring complex decision-making, and there is a high probability of imminent or life-threatening deterioration in the patient’s condition  * The patient required frequent reassessments and monitoring to ensure response to interventions and therapies.    Critical care time includes time spent evaluating and treating the patient's acute illness as well as time spent reviewing labs, radiology,  and discussing the case with a multidisciplinary team in an effort to prevent further life threatening deterioration or end organ damage. This time is independent of any procedures performed.
This patient is critically ill due to the following:  * Multiple organ failure requiring complex decision-making, and there is a high probability of imminent or life-threatening deterioration in the patient’s condition  * The patient required frequent reassessments and monitoring to ensure response to interventions and therapies.    Critical care time includes time spent evaluating and treating the patient's acute illness as well as time spent reviewing labs, radiology,  and discussing the case with a multidisciplinary team in an effort to prevent further life threatening deterioration or end organ damage. This time is independent of any procedures performed.
Attending comments:  -I have personally seen and examined this patient.  I have reviewed all pertinent clinical information and reviewed all relevant imaging and diagnostic studies personally.   -The patient's injury acutely impairs one or more vital organ systems, and there is a high probability of imminent or life threatening deterioration in the patient's condition. The care of this patient requires complex medical decision making in the field of Infectious Diseases and extensive interpretation of laboratory, microbiological and radiographic data.
This patient is critically ill due to the following:  * Multiple organ failure requiring complex decision-making, and there is a high probability of imminent or life-threatening deterioration in the patient’s condition  * The patient required frequent reassessments and monitoring to ensure response to interventions and therapies.    Critical care time includes time spent evaluating and treating the patient's acute illness as well as time spent reviewing labs, radiology,  and discussing the case with a multidisciplinary team in an effort to prevent further life threatening deterioration or end organ damage. This time is independent of any procedures performed.
Attending comments:  -I have personally seen and examined this patient.  I have reviewed all pertinent clinical information and reviewed all relevant imaging and diagnostic studies personally.   -The patient's injury acutely impairs one or more vital organ systems, and there is a high probability of imminent or life threatening deterioration in the patient's condition. The care of this patient requires complex medical decision making in the field of Infectious Diseases and extensive interpretation of laboratory, microbiological and radiographic data.
This patient is critically ill due to the following:  * Multiple organ failure requiring complex decision-making, and there is a high probability of imminent or life-threatening deterioration in the patient’s condition  * The patient required frequent reassessments and monitoring to ensure response to interventions and therapies.    Critical care time includes time spent evaluating and treating the patient's acute illness as well as time spent reviewing labs, radiology,  and discussing the case with a multidisciplinary team in an effort to prevent further life threatening deterioration or end organ damage. This time is independent of any procedures performed.

## 2025-07-01 NOTE — PROGRESS NOTE ADULT - SUBJECTIVE AND OBJECTIVE BOX
SERENAMONIQUE  70y, Male    LOS  17d    INTERVAL EVENTS/HPI  - T(F): , Max: 100.9 (07-01-25 @ 05:00)  - WBC Count: 19.24 (07-01-25 @ 06:16)  WBC Count: 19.44 (06-30-25 @ 06:20)  - Creatinine: 1.1 (07-01-25 @ 06:16)  Creatinine: 1.1 (06-30-25 @ 06:20)    REVIEW OF SYSTEMS: cannot obtain further history from the patient secondary to altered mental status or sedation    ANTIMICROBIALS:   meropenem  IVPB 1000 every 12 hours  meropenem  IVPB    vancomycin  IVPB 1250 every 24 hours      OTHER MEDS: MEDICATIONS  (STANDING):  acetaminophen     Tablet .. 650 every 6 hours PRN  albuterol/ipratropium for Nebulization 3 every 6 hours  aluminum hydroxide/magnesium hydroxide/simethicone Suspension 30 every 4 hours PRN  aMIOdarone    Tablet 200 daily  apixaban 5 every 12 hours  atorvastatin 40 at bedtime  bumetanide Injectable 1 two times a day  clopidogrel Tablet 75 daily  dextrose 50% Injectable 25 once  dextrose 50% Injectable 12.5 once  dextrose 50% Injectable 25 once  dextrose Oral Gel 15 once PRN  ezetimibe 10 daily  fentaNYL   Infusion. 0.501 <Continuous>  insulin glargine Injectable (LANTUS) 34 at bedtime  insulin lispro (ADMELOG) corrective regimen sliding scale  every 6 hours  insulin lispro Injectable (ADMELOG) 8 every 6 hours  melatonin 5 at bedtime PRN  metoprolol tartrate 25 two times a day  midodrine. 5 three times a day PRN  pantoprazole  Injectable 40 two times a day  senna 2 at bedtime      Vital Signs Last 24 Hrs  T(F): 99.7 (07-01-25 @ 08:08), Max: 101.4 (06-26-25 @ 23:00)    Vital Signs Last 24 Hrs  HR: 78 (07-01-25 @ 08:08) (78 - 101)  BP: 90/50 (07-01-25 @ 08:08) (88/54 - 177/73)  RR: 20 (07-01-25 @ 08:08)  SpO2: 100% (07-01-25 @ 08:08) (97% - 100%)  Wt(kg): --    EXAM:  GENERAL: On MV  HEAD: No head lesions  NECK: Supple  CHEST/LUNG: Shallow breath sounds   HEART: S1 S2  ABDOMEN: Soft, nontender +Mcmahon  EXTREMITIES: No clubbing  MSK: +1 edema   SKIN: No rashes or lesions, no superficial thrombophlebitis    Labs:                        8.0    19.24 )-----------( 537      ( 01 Jul 2025 06:16 )             27.4     07-01    145  |  107  |  68[HH]  ----------------------------<  240[H]  4.8   |  28  |  1.1    Ca    8.5      01 Jul 2025 06:16  Mg     2.6     06-30    TPro  5.2[L]  /  Alb  2.6[L]  /  TBili  <0.2  /  DBili  x   /  AST  27  /  ALT  27  /  AlkPhos  75  07-01      WBC Trend:  WBC Count: 19.24 (07-01-25 @ 06:16)  WBC Count: 19.44 (06-30-25 @ 06:20)  WBC Count: 18.14 (06-29-25 @ 06:24)  WBC Count: 19.25 (06-28-25 @ 06:28)      Creatine Trend:  Creatinine: 1.1 (07-01)  Creatinine: 1.1 (06-30)  Creatinine: 1.3 (06-29)  Creatinine: 1.2 (06-28)      Liver Biochemical Testing Trend:  Alanine Aminotransferase (ALT/SGPT): 27 (07-01)  Alanine Aminotransferase (ALT/SGPT): 30 (06-30)  Alanine Aminotransferase (ALT/SGPT): 37 (06-29)  Alanine Aminotransferase (ALT/SGPT): 25 (06-28)  Alanine Aminotransferase (ALT/SGPT): 25 (06-27)  Aspartate Aminotransferase (AST/SGOT): 27 (07-01-25 @ 06:16)  Aspartate Aminotransferase (AST/SGOT): 29 (06-30-25 @ 06:20)  Aspartate Aminotransferase (AST/SGOT): 40 (06-29-25 @ 06:24)  Aspartate Aminotransferase (AST/SGOT): 32 (06-28-25 @ 06:28)  Aspartate Aminotransferase (AST/SGOT): 31 (06-27-25 @ 06:06)  Bilirubin Total: <0.2 (07-01)  Bilirubin Total: <0.2 (06-30)  Bilirubin Total: <0.2 (06-29)  Bilirubin Total: <0.2 (06-28)  Bilirubin Total: <0.2 (06-27)      Trend LDH  06-15-25 @ 06:45  375[H]      Urinalysis Basic - ( 01 Jul 2025 06:16 )    Color: x / Appearance: x / SG: x / pH: x  Gluc: 240 mg/dL / Ketone: x  / Bili: x / Urobili: x   Blood: x / Protein: x / Nitrite: x   Leuk Esterase: x / RBC: x / WBC x   Sq Epi: x / Non Sq Epi: x / Bacteria: x        MICROBIOLOGY:  Vancomycin Level, Random: 14.4 (06-29 @ 07:00)  Vancomycin Level, Trough: 14.5 (06-20 @ 06:16)  Vancomycin Level, Random: 11.4 (06-19 @ 06:27)  Vancomycin Level, Trough: 7.0 (06-18 @ 18:00)  Vancomycin Level, Trough: 8.1 (06-17 @ 11:01)  Vancomycin Level, Random: 14.5 (06-15 @ 06:45)    Male    Culture - Acid Fast - Sputum w/Smear (collected 27 Jun 2025 21:50)  Source: Trach Asp Tracheal Aspirate    Culture - Acid Fast - Bronchial w/Smear (collected 27 Jun 2025 13:45)  Source: Bronch Wash Bronchial Wash  Preliminary Report:    Culture is being performed.    Culture - Acid Fast - Bronchial w/Smear (collected 27 Jun 2025 13:40)  Source: Bronchial Bronchial  Preliminary Report:    Culture is being performed.    Culture - Bronchial (collected 27 Jun 2025 11:55)  Source: Bronch Wash None  Final Report:    Commensal magda consistent with body site    Culture - Bronchial (collected 27 Jun 2025 11:55)  Source: Lavage None  Final Report:    Commensal magda consistent with body site    Culture - Sputum (collected 27 Jun 2025 09:14)  Source: Trach Asp Tracheal Aspirate  Final Report:    Commensal magda consistent with body site    Culture - Blood (collected 27 Jun 2025 06:06)  Source: Blood None  Preliminary Report:    No growth at 72 Hours    Culture - Acid Fast - Sputum w/Smear (collected 24 Jun 2025 09:00)  Source: Trach Asp Tracheal Aspirate  Preliminary Report:    Culture is being performed.    Culture - Blood (collected 16 Jun 2025 06:28)  Source: Blood Blood  Final Report:    No growth at 5 days    Culture - Sputum (collected 15 Kleber 2025 16:44)  Source: Sputum Sputum  Final Report:    Commensal magda consistent with body site      HIV-1/2 Combo Result: Nonreact (06-16-25 @ 06:28)  HIV-1/2 Combo Result: Nonreact (01-04-25 @ 05:58)    COVID-19 PCR: NotDetec (05-15-25 @ 09:50)    Blood Gas Arterial, Lactate: 0.6 (07-01 @ 04:22)  Blood Gas Arterial, Lactate: 0.6 (06-30 @ 05:08)  Blood Gas Arterial, Lactate: 0.8 (06-29 @ 17:09)  Blood Gas Arterial, Lactate: 0.5 (06-29 @ 04:30)        RADIOLOGY & ADDITIONAL TESTS:  I have personally reviewed the relevant images.   CXR  Xray Chest 1 View- PORTABLE-Urgent:   ACC: [id number] EXAM:  XR CHEST PORTABLE URGENT 1V   ORDERED BY: MATTHEW ALANIZ     PROCEDURE DATE:  06/30/2025          INTERPRETATION:  CLINICAL INDICATION:  intubated    COMPARISON: Chest radiograph 6/29/2025.    TECHNIQUE: AP radiograph of the chest. Patient is rotated.    FINDINGS:    Support devices: Endotracheal tube terminating above the oscar. There is   an enteric tube coursing below the diaphragm with the tip not imaged. ICD   on left side of chest, unchanged position.    Cardiac/mediastinum/hilum: Unchanged.    Lung parenchyma/Pleura: Bilateral opacities, unchanged on the right, and   slightly increased on the left compared to prior. No pneumothorax.    Skeleton/soft tissues: Unchanged.    IMPRESSION:    Bilateral opacities, unchanged on the right, and slightly increased on   the left compared to prior.    --- End of Report ---          MALISSA BANG MD; Resident Radiologist  This document has been electronically signed.  LEONARDA CAMP MD; Attending Radiologist  This document has been electronically signed. Jun 30 2025 11:17AM (06-30-25 @ 10:53)      CT    < from: Xray Chest 1 View- PORTABLE-Urgent (Xray Chest 1 View- PORTABLE-Urgent .) (06.30.25 @ 10:53) >  INTERPRETATION:  CLINICAL INDICATION:  intubated    COMPARISON: Chest radiograph 6/29/2025.    TECHNIQUE: AP radiograph of the chest. Patient is rotated.    FINDINGS:    Support devices: Endotracheal tube terminating above the oscar. There is   an enteric tube coursing below the diaphragm with the tip not imaged. ICD   on left side of chest, unchanged position.    Cardiac/mediastinum/hilum: Unchanged.    Lung parenchyma/Pleura: Bilateral opacities, unchanged on the right, and   slightly increased on the left compared to prior. No pneumothorax.    Skeleton/soft tissues: Unchanged.    IMPRESSION:    Bilateral opacities, unchanged on the right, and slightly increased on   the left compared to prior.    --- End of Report ---    < end of copied text >      WEIGHT  Weight (kg): 83.3 (06-14-25 @ 08:00)  Creatinine: 1.1 mg/dL (07-01-25 @ 06:16)      All available historical records have been reviewed

## 2025-07-01 NOTE — PROGRESS NOTE ADULT - ASSESSMENT
This is a 70 year old male with past medical history of  COPD (on 2L home O2), DM 2  HFrEF 30%, CAD,  ischemic cardiomyopathy s/p CRT-D, HTN, paroxysmal A-fib (status post ablation 1/14/25) on amiodarone and  Eliquis who presented to ED w c/o LESLIE and b/l  leg swelling.    IMPRESSION  #Chronic Leukocytosis- Now Worsened- Possible leukemoid reaction  #Acute on chronic hypoxic respiratory failure  #Heart failure with reduced EF, Acute on chronic exacerbation  #Large Bilateral pleural effusions/Pulmonary edema- Improved on recent CT chest  #Anemia- Possible ischemic in nature resulting in loose stools  #CT abdomen noted for colitis- Suspect Ischemic colitis.   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  #Obesity BMI (kg/m2): 32.5, 28.7, 29.6, 30.1    Recently Enterovirus Positive Discharged on PO steroids Vantin+Doxy    Covid/Flu/RSV negative  MRSA nares positive  , Fungitell 108 (very mildly elevated)   Blood cultures NGTD  Sputum cultures Normal Respiratory magda  C.diff negative     RECOMMENDATIONS  -Initially finished broad spectrum abx 6/23  -Unclear cause of fevers- Likely drug induced vs pleural effusions.  -Monitor for fever curve. Trend WBC  -S/p BAL 6/27- Thick copious secretions from BL lung fields reported. Cultures Normal respiratory magda.   -IV Vanc dosing as per pharmacy protocol.  -IV Meropenem 1000mg q 12 hours. (E/D all abx 7/1)   -Cardiology follow up.  -Off loading to prevent pressure sores and preventive measures to avoid aspiration. TOV. GOC. Recurrent admissions expected.     If any questions, please send a message or call on [employer]  Please continue to update ID with any pertinent new laboratory or radiographic findings.    Benigno Pond M.D  Infectious Diseases Attending/   [employer] at [facility]

## 2025-07-01 NOTE — PROGRESS NOTE ADULT - ASSESSMENT
IMPRESSION:    Acute on chronic HFrEF decompensated heart failure EF 31%   VAP   Anemia  Acute pulmonary edema  COPD, not in active exacerbation  pulm HTN multifactorial secondary to cardiomyopathy reduced EF, likely LAW, baseline COPD  chronic hypoxia from all the above  Small pleural effusions due to pulmonary edema      PLAN:    CNS: Daily SAT, off PRECEDEX, febrile today     HEENT: Oral care    PULMONARY:  HOB @ 45 degrees. Duonebs q 6 hrs, Vent Changes: monitor for vent synchrony, f/u Peak and Plateau pressures, allow permissive hypercapnia, CXR today       CARDIOVASCULAR: TTE noted , volume contraction as tolerated, Cardiology follow up, Bumex 1mg BID     GI: GI prophylaxis. Feeding. trend Hb stable, f/u CT abdomen w/ no active bleed    RENAL:  Follow up lytes. Correct as needed, f/u nephro eval noted     INFECTIOUS DISEASE: Follow up cultures, MRSA +, finished course of vancomycin and cefepime, f/u ID reccs, trend WBC, f/u bronch cultures, on vanco and merem     HEMATOLOGICAL: On AC, okay per GI, f/u Us duplex LE with no DVT     ENDOCRINE:  Follow up FS. Insulin protocol if needed.    MUSCULOSKELETAL: off loading     Palliative follow up  Poor prognosis   MICU monitoring   Clarify GOC, and code status  Ethics f/u

## 2025-07-01 NOTE — PROGRESS NOTE ADULT - ASSESSMENT
This is a 70 year old male with past medical history of  COPD (on 2L home O2), DM 2  HFrEF 30%, CAD,  ischemic cardiomyopathy s/p CRT-D, HTN, paroxysmal A-fib (status post ablation 1/14/25) on amiodarone and  Eliquis who presented to ED w c/o LESLIE and b/l  leg swelling.    Acute on chronic hypoxic respiratory failure   Acute on chronic HFrEF decompensated heart failure EF 31%   VAP   Chronic leukocytosis   Anemia  Splenic infarct  Possible colitis on CT, possible ischemic colitis?  Acute pulmonary edema  COPD, not in active exacerbation, on home O2   pulm HTN multifactorial secondary to cardiomyopathy reduced EF, likely LAW, baseline COPD  HTN, HLD, CAD s/p MANN to mid LAD in 2021, ICM s/p AICD, AF, Bioprosthetic Aortic valve  CKD 3, DM, Lung nodule    -poorly weaning, daily SAT, vent and sedation management per CC  -S/p BAL 6/27- Thick copious secretions from BL lung fields reported. Cultures Normal respiratory magda.   -IV Vanc dosing as per pharmacy protocol.  -IV Meropenem 1000mg q 12 hours  -still spiked fever this am 100.9, initially plan was to stop abx today, will discuss with ID   -bumex 1mg bid per CC  -cardio f/u  -trend wbc and temps    no family at bedside today and for the last few days   will prepare to reinstate dnr as per MOLST form reaffirmed on admission by patient, once son notified (however difficulty getting in touch and no family present at bedside last few days)-will coordinate with palliative and ethics teams

## 2025-07-01 NOTE — PROGRESS NOTE ADULT - ASSESSMENT
70M with PMH of COPD, DM2, HFrEF, CAD, ICM s/p CRT-D, HTN, pAF< eliquis here with LESLIE and bilateral leg swelling, and found to have acute HFrEF and pulmonary edema, on vent, nebs, steroids, and bumex. Will need ischemic eval eventually. Patient was DNR/DNI/trial NIV on recent admission after d/w palliative, but wife opted this admission for intubation. Palliative c/s for GOC.             70M with PMH of COPD, DM2, HFrEF, CAD, ICM s/p CRT-D, HTN, pAF< eliquis here with LESLIE and bilateral leg swelling, and found to have acute HFrEF and pulmonary edema, on vent, nebs, steroids, and bumex. Will need ischemic eval eventually. Patient was DNR/DNI/trial NIV on recent admission after d/w palliative, but wife opted this admission for intubation. Palliative c/s for GOC.    I was able to speak with ethics (Mariza Cam) yesterday, who has been in communication with the legal team. We had planned for me to speak with the family to notify them that we would be honoring patient's previously expressed wishes, which would include DNR and to not proceed with tracheostomy. I called patient's son and left a  for callback this morning, but have not heard back.     Plan:  - IV bumex per primary team  - will continue attempts to speak to family    Palliative care will continue to follow.   Please call [phone] with questions or concerns 24/7.     Reviewed documentation from: angela MUNOZ, ID  _____________  He Scar Vera MD  Palliative Medicine  [facility]  [phone]

## 2025-07-01 NOTE — PROGRESS NOTE ADULT - SUBJECTIVE AND OBJECTIVE BOX
MONIQUE LYONS 70y Male  MRN#: [id number]     Hospital Day: 17d      SUBJECTIVE  No acute events overnight, pt seen and examined this morning. spiked fever early this morning to 100.9                                          ----------------------------------------------------------  OBJECTIVE  PAST MEDICAL & SURGICAL HISTORY  CHF (congestive heart failure)    Diabetes    CAD (coronary artery disease)    Chronic obstructive pulmonary disease (COPD)    HTN (hypertension)    Stented coronary artery    Dyslipidemia    GERD (gastroesophageal reflux disease)    Afib    CVA (cerebral vascular accident)    H/O heart artery stent    Heart valve replaced  aortic    History of Nissen fundoplication                                              -----------------------------------------------------------  ALLERGIES:  Bactrim (Unknown)  sulfa drugs (Unknown)                                            ------------------------------------------------------------    HOME MEDICATIONS  Home Medications:  atorvastatin 40 mg oral tablet: 1 tab(s) orally once a day (25 May 2025 03:34)  clopidogrel 75 mg oral tablet: 1 tab(s) orally once a day (25 May 2025 03:34)  ezetimibe 10 mg oral tablet: 1 orally once a day (25 May 2025 03:37)  famotidine 40 mg oral tablet: 1 tab(s) orally once a day (25 May 2025 03:37)  fenofibrate 145 mg oral tablet: 1 orally once a day (25 May 2025 03:37)  melatonin 5 mg oral tablet: 1 tab(s) orally once a day (at bedtime) (25 May 2025 03:37)  metFORMIN 500 mg oral tablet: 1 tab(s) orally 2 times a day (25 May 2025 03:37)  metoprolol succinate 50 mg oral tablet, extended release: 1 tab(s) orally once a day (25 May 2025 03:37)  Soaanz 20 mg oral tablet: 1 tab(s) orally once a day (06 Jun 2025 15:53)  spironolactone 25 mg oral tablet: 1 tab(s) orally every 24 hours (25 May 2025 04:25)  Tresiba 100 units/mL subcutaneous solution: 34 unit(s) subcutaneous once a day (in the morning) (25 May 2025 04:25)                           MEDICATIONS:  STANDING MEDICATIONS  albuterol/ipratropium for Nebulization 3 milliLiter(s) Nebulizer every 6 hours  aMIOdarone    Tablet 200 milliGRAM(s) Oral daily  apixaban 5 milliGRAM(s) Enteral Tube every 12 hours  atorvastatin 40 milliGRAM(s) Oral at bedtime  bumetanide Injectable 1 milliGRAM(s) IV Push two times a day  chlorhexidine 0.12% Liquid 15 milliLiter(s) Oral Mucosa every 12 hours  chlorhexidine 2% Cloths 1 Application(s) Topical <User Schedule>  clopidogrel Tablet 75 milliGRAM(s) Enteral Tube daily  dextrose 5%. 1000 milliLiter(s) IV Continuous <Continuous>  dextrose 5%. 1000 milliLiter(s) IV Continuous <Continuous>  dextrose 50% Injectable 25 Gram(s) IV Push once  dextrose 50% Injectable 12.5 Gram(s) IV Push once  dextrose 50% Injectable 25 Gram(s) IV Push once  ezetimibe 10 milliGRAM(s) Oral daily  fentaNYL   Infusion. 0.501 MICROgram(s)/kG/Hr IV Continuous <Continuous>  insulin glargine Injectable (LANTUS) 34 Unit(s) SubCutaneous at bedtime  insulin lispro (ADMELOG) corrective regimen sliding scale   SubCutaneous every 6 hours  insulin lispro Injectable (ADMELOG) 8 Unit(s) SubCutaneous every 6 hours  meropenem  IVPB 1000 milliGRAM(s) IV Intermittent every 12 hours  meropenem  IVPB      metoprolol tartrate 25 milliGRAM(s) Oral two times a day  pantoprazole  Injectable 40 milliGRAM(s) IV Push daily  senna 2 Tablet(s) Oral at bedtime  vancomycin  IVPB 1250 milliGRAM(s) IV Intermittent every 24 hours    PRN MEDICATIONS  acetaminophen     Tablet .. 650 milliGRAM(s) Oral every 6 hours PRN  aluminum hydroxide/magnesium hydroxide/simethicone Suspension 30 milliLiter(s) Oral every 4 hours PRN  dextrose Oral Gel 15 Gram(s) Oral once PRN  melatonin 5 milliGRAM(s) Oral at bedtime PRN  midodrine. 5 milliGRAM(s) Oral three times a day PRN                                            ------------------------------------------------------------  VITAL SIGNS: Last 24 Hours  T(C): 37.9 (01 Jul 2025 11:01), Max: 38.3 (01 Jul 2025 05:00)  T(F): 100.2 (01 Jul 2025 11:01), Max: 100.9 (01 Jul 2025 05:00)  HR: 88 (01 Jul 2025 11:00) (73 - 101)  BP: 130/61 (01 Jul 2025 11:00) (88/54 - 177/73)  BP(mean): 88 (01 Jul 2025 11:00) (65 - 106)  RR: 17 (01 Jul 2025 11:01) (15 - 34)  SpO2: 100% (01 Jul 2025 11:00) (97% - 100%)      06-30-25 @ 07:01  -  07-01-25 @ 07:00  --------------------------------------------------------  IN: 3965.2 mL / OUT: 2685 mL / NET: 1280.2 mL    07-01-25 @ 07:01 - 07-01-25 @ 12:53  --------------------------------------------------------  IN: 12 mL / OUT: 755 mL / NET: -743 mL                                             --------------------------------------------------------------  LABS:                        8.0    19.24 )-----------( 537      ( 01 Jul 2025 06:16 )             27.4     07-01    145  |  107  |  68[HH]  ----------------------------<  240[H]  4.8   |  28  |  1.1    Ca    8.5      01 Jul 2025 06:16  Mg     2.6     06-30    TPro  5.2[L]  /  Alb  2.6[L]  /  TBili  <0.2  /  DBili  x   /  AST  27  /  ALT  27  /  AlkPhos  75  07-01      Urinalysis Basic - ( 01 Jul 2025 06:16 )    Color: x / Appearance: x / SG: x / pH: x  Gluc: 240 mg/dL / Ketone: x  / Bili: x / Urobili: x   Blood: x / Protein: x / Nitrite: x   Leuk Esterase: x / RBC: x / WBC x   Sq Epi: x / Non Sq Epi: x / Bacteria: x      ABG - ( 01 Jul 2025 04:22 )  pH, Arterial: 7.42  pH, Blood: x     /  pCO2: 47    /  pO2: 102   / HCO3: 30    / Base Excess: 5.1   /  SaO2: 99.3                                                                  -------------------------------------------------------------  RADIOLOGY:  CXR  Xray Chest 1 View- PORTABLE-Urgent:   ACC: [id number] EXAM:  XR CHEST PORTABLE URGENT 1V   ORDERED BY: MATTHEW ALANIZ     PROCEDURE DATE:  06/30/2025          INTERPRETATION:  CLINICAL INDICATION:  intubated    COMPARISON: Chest radiograph 6/29/2025.    TECHNIQUE: AP radiograph of the chest. Patient is rotated.    FINDINGS:    Support devices: Endotracheal tube terminating above the oscar. There is   an enteric tube coursing below the diaphragm with the tip not imaged. ICD   on left side of chest, unchanged position.    Cardiac/mediastinum/hilum: Unchanged.    Lung parenchyma/Pleura: Bilateral opacities, unchanged on the right, and   slightly increased on the left compared to prior. No pneumothorax.    Skeleton/soft tissues: Unchanged.    IMPRESSION:    Bilateral opacities, unchanged on the right, and slightly increased on   the left compared to prior.    --- End of Report ---          MALISSA BANG MD; Resident Radiologist  This document has been electronically signed.  LEONARDA CAMP MD; Attending Radiologist  This document has been electronically signed. Jun 30 2025 11:17AM (06-30-25 @ 10:53)  Xray Chest 1 View- PORTABLE-Urgent:   ACC: [id number] EXAM:  XR CHEST PORTABLE URGENT 1V   ORDERED BY: YOLA DUKE     PROCEDURE DATE:  06/28/2025          INTERPRETATION:  Clinical History / Reason for exam: et tube advanced    Comparison : Chest radiograph: June 28 2025    Technique/Positioning: Frontal view of the chest    Findings/  impression:    Support devices: Left AICD, endotracheal and enteric tubes    Lung parenchyma/Pleura: Stable bilateral opacities and effusions.    Cardiac/mediastinum/hilum: No significant change    Skeleton/soft tissues: No significant change.    --- End of Report ---            PRESTON RODRÍGUEZ MD; Attending Radiologist  This document has been electronically signed. Jun 29 2025 11:15PM (06-28-25 @ 18:36)      CT                                            --------------------------------------------------------------    PHYSICAL EXAM:  GENERAL:  lying in bed, +intubated   CHEST/LUNG: vent sounds  HEART: IRRegular  ABDOMEN: Soft, nontender, nondistended  EXTREMITIES:  minimal LE edema

## 2025-07-01 NOTE — PHARMACOTHERAPY INTERVENTION NOTE - INTERVENTION TYPE RECOOMEND
Pharmacokinetics Evaluation
Timing/Frequency of Administration Recommended

## 2025-07-01 NOTE — PROGRESS NOTE ADULT - SUBJECTIVE AND OBJECTIVE BOX
Patient is a 70y old  Male who presents with a chief complaint of SOB (01 Jul 2025 08:44)        Over Night Events: No acute events, febrile, off sedation         ROS:     All ROS are negative except HPI         PHYSICAL EXAM    ICU Vital Signs Last 24 Hrs  T(C): 37.6 (01 Jul 2025 08:08), Max: 38.3 (01 Jul 2025 05:00)  T(F): 99.7 (01 Jul 2025 08:08), Max: 100.9 (01 Jul 2025 05:00)  HR: 78 (01 Jul 2025 08:08) (78 - 101)  BP: 90/50 (01 Jul 2025 08:08) (88/54 - 177/73)  BP(mean): 65 (01 Jul 2025 08:08) (65 - 106)  ABP: --  ABP(mean): --  RR: 20 (01 Jul 2025 08:08) (15 - 34)  SpO2: 100% (01 Jul 2025 08:08) (97% - 100%)    O2 Parameters below as of 01 Jul 2025 07:00  Patient On (Oxygen Delivery Method): ventilator    O2 Concentration (%): 40    CONSTITUTIONAL:  NAD    ENT:   Airway patent,   Mouth with normal mucosa.     EYES:   Pupils equal,   Round and reactive to light.    CARDIAC:   Normal rate,   Regular rhythm.      Vascular:  Normal systolic impulse  No Carotid bruits    RESPIRATORY:   No wheezing  Bilateral BS    GASTROINTESTINAL:  Abdomen soft,   Non-tender,   No guarding,   + BS    MUSCULOSKELETAL:   Range of motion is not limited,  No clubbing, cyanosis    NEUROLOGICAL:   Intubated    No motor  deficits.    SKIN:   Skin normal color for race,   Warm and dry and intact.   No evidence of rash.        06-30-25 @ 07:01  -  07-01-25 @ 07:00  --------------------------------------------------------  IN:    Enteral Tube Flush: 823 mL    FentaNYL: 455.2 mL    Free Water: 900 mL    IV PiggyBack: 400 mL    Peptamen A.F.: 1387 mL  Total IN: 3965.2 mL    OUT:    Indwelling Catheter - Urethral (mL): 2685 mL  Total OUT: 2685 mL    Total NET: 1280.2 mL      07-01-25 @ 07:01  -  07-01-25 @ 09:26  --------------------------------------------------------  IN:    FentaNYL: 12 mL  Total IN: 12 mL    OUT:    Indwelling Catheter - Urethral (mL): 330 mL    Peptamen A.F.: 0 mL  Total OUT: 330 mL    Total NET: -318 mL        LABS:                            8.0    19.24 )-----------( 537      ( 01 Jul 2025 06:16 )             27.4                                               07-01    145  |  107  |  68[HH]  ----------------------------<  240[H]  4.8   |  28  |  1.1    Ca    8.5      01 Jul 2025 06:16  Mg     2.6     06-30    TPro  5.2[L]  /  Alb  2.6[L]  /  TBili  <0.2  /  DBili  x   /  AST  27  /  ALT  27  /  AlkPhos  75  07-01                                             Urinalysis Basic - ( 01 Jul 2025 06:16 )    Color: x / Appearance: x / SG: x / pH: x  Gluc: 240 mg/dL / Ketone: x  / Bili: x / Urobili: x   Blood: x / Protein: x / Nitrite: x   Leuk Esterase: x / RBC: x / WBC x   Sq Epi: x / Non Sq Epi: x / Bacteria: x                                                  LIVER FUNCTIONS - ( 01 Jul 2025 06:16 )  Alb: 2.6 g/dL / Pro: 5.2 g/dL / ALK PHOS: 75 U/L / ALT: 27 U/L / AST: 27 U/L / GGT: x                                                                                               Mode: AC/ CMV (Assist Control/ Continuous Mandatory Ventilation)  RR (machine): 20  TV (machine): 440  FiO2: 40  PEEP: 8  ITime: 1  MAP: 12  PIP: 25                                      ABG - ( 01 Jul 2025 04:22 )  pH, Arterial: 7.42  pH, Blood: x     /  pCO2: 47    /  pO2: 102   / HCO3: 30    / Base Excess: 5.1   /  SaO2: 99.3                MEDICATIONS  (STANDING):  albuterol/ipratropium for Nebulization 3 milliLiter(s) Nebulizer every 6 hours  aMIOdarone    Tablet 200 milliGRAM(s) Oral daily  apixaban 5 milliGRAM(s) Enteral Tube every 12 hours  atorvastatin 40 milliGRAM(s) Oral at bedtime  bumetanide Injectable 1 milliGRAM(s) IV Push two times a day  chlorhexidine 0.12% Liquid 15 milliLiter(s) Oral Mucosa every 12 hours  chlorhexidine 2% Cloths 1 Application(s) Topical <User Schedule>  clopidogrel Tablet 75 milliGRAM(s) Enteral Tube daily  dextrose 5%. 1000 milliLiter(s) (100 mL/Hr) IV Continuous <Continuous>  dextrose 5%. 1000 milliLiter(s) (50 mL/Hr) IV Continuous <Continuous>  dextrose 50% Injectable 25 Gram(s) IV Push once  dextrose 50% Injectable 12.5 Gram(s) IV Push once  dextrose 50% Injectable 25 Gram(s) IV Push once  ezetimibe 10 milliGRAM(s) Oral daily  fentaNYL   Infusion. 0.501 MICROgram(s)/kG/Hr (4.17 mL/Hr) IV Continuous <Continuous>  insulin glargine Injectable (LANTUS) 34 Unit(s) SubCutaneous at bedtime  insulin lispro (ADMELOG) corrective regimen sliding scale   SubCutaneous every 6 hours  insulin lispro Injectable (ADMELOG) 8 Unit(s) SubCutaneous every 6 hours  meropenem  IVPB      meropenem  IVPB 1000 milliGRAM(s) IV Intermittent every 12 hours  metoprolol tartrate 25 milliGRAM(s) Oral two times a day  pantoprazole  Injectable 40 milliGRAM(s) IV Push two times a day  senna 2 Tablet(s) Oral at bedtime  vancomycin  IVPB 1250 milliGRAM(s) IV Intermittent every 24 hours    MEDICATIONS  (PRN):  acetaminophen     Tablet .. 650 milliGRAM(s) Oral every 6 hours PRN Temp greater or equal to 38C (100.4F), Mild Pain (1 - 3)  aluminum hydroxide/magnesium hydroxide/simethicone Suspension 30 milliLiter(s) Oral every 4 hours PRN Dyspepsia  dextrose Oral Gel 15 Gram(s) Oral once PRN Blood Glucose LESS THAN 70 milliGRAM(s)/deciliter  melatonin 5 milliGRAM(s) Oral at bedtime PRN Insomnia  midodrine. 5 milliGRAM(s) Oral three times a day PRN Hypotension      New X-rays reviewed:                                                                                  ECHO

## 2025-07-01 NOTE — PHARMACOTHERAPY INTERVENTION NOTE - COMMENTS
Based on level of 11.4, current vancomycin dosing of 750mg IV q24h predicts a subtherapeutic AUC of 313. Recommended to increase dosing to 1000mg IV q24h which predicts a therapeutic AUC of 417 and obtain a level before 3rd dose on 6/21 with AM labs. Obtain a level sooner if Scr worsens.
Recommended to continue vancomycin 1250 mg IV q24h. The vancomycin level today, 6/29 was 14.4mg/L. As per PrecisePK calculations, predicted vancomycin AUC/SUHA is 580 mg/L*h at steady-state, which is within the therapeutic range 400 - 600 mg/L*h. Serum creatinine is 1.3 mg/dL.      Lisbeth Jacobson PharmD   Clinical Pharmacy Specialist, Infectious Diseases  Tele-Antimicrobial Stewardship Program (Tele-ASP)  Tele-ASP Phone: [phone]  
As per policy, ordered a vancomycin level for 6/15 AM to assist with further dosing recommendations.    Aldo Beckett, PharmD, [facility]  Clinical Pharmacy Specialist, Infectious Diseases  Tele-Antimicrobial Stewardship Program (Tele-ASP)  Tele-ASP Phone: [phone] 
Patient ordered vancomycin 1250mg IV q12h as empiric treatment per Dr. Pond. No doses have been given yet. Per [employer] Bayesian vancomycin dosing software, current vancomycin regimen predicts a supratherapeutic steady-state AUC/SUHA of 952.64 mg/L*hr (goal 400-600). A reduced vancomycin regimen of 1250mg IV q24h predicts a therapeutic steady-state AUC/SUHA of 476.32 mg/L*hr. Recommended decreasing vancomycin dose to 1250mg IV q24h, and obtaining a vancomycin level on 6/29 with AM labs, prior to third dose of regimen (does not need to be a true trough as PrecisePK will adjust for levels collected early). 
Recommended to increase cefepime to 2g IV q12h based on CrCl=35 ml/min for pneumonia. 
s/w provider- okay to reduce pantoprazole frequency to daily 
Based on vancomycin level of 8.1 and in the setting of improved renal function, recommended to re-initiate vancomycin at dosing of 750mg IV q24h which predicts a therapeutic AUC of 546 and to obtain a level on 6/19 with AM labs before third dose. Obtain a level sooner if Scr worsens. 
As per policy, ordered a vancomycin level for 6/16 AM to assist with further dosing recommendations.    Aldo Beckett, PharmD, [facility]  Clinical Pharmacy Specialist, Infectious Diseases  Tele-Antimicrobial Stewardship Program (Tele-ASP)  Tele-ASP Phone: [phone] 
Physician Lead Consult: Contacted with assistance with vancomycin order    Recommended vancomycin 750mg x 1 and dosing vancomycin by level in setting of KAREN. Further dosing to be guided by collected levels.    Aldo Beckett, PharmD, [facility]  Clinical Pharmacy Specialist, Infectious Diseases  Tele-Antimicrobial Stewardship Program (Tele-ASP)  Tele-ASP Phone: [phone] 
Recommended vancomycin 1250mg x 1 followed by vancomycin dose by levels in setting of unstable renal function. Further dosing to be guided by collected vancomycin levels.    Aldo Beckett, PharmD, [facility]  Clinical Pharmacy Specialist, Infectious Diseases  Tele-Antimicrobial Stewardship Program (Tele-ASP)  Tele-ASP Phone: [phone] 
Recommended decreasing meropenem dose to 1000mg IV q12h given CrCl of ~47 mL/min.

## 2025-07-01 NOTE — PROGRESS NOTE ADULT - SUBJECTIVE AND OBJECTIVE BOX
HPI: 70M with PMH of COPD, DM2, HFrEF, CAD, ICM s/p CRT-D, HTN, pAF< eliquis here with LESLIE and bilateral leg swelling, and found to have acute HFrEF and pulmonary edema, on vent, nebs, steroids, and bumex. Will need ischemic eval eventually. Patient was DNR/DNI/trial NIV on recent admission after d/w palliative, but wife opted this admission for intubation. Palliative c/s for GOC.    INTERVAL EVENTS  6/18: patient with no acute distress  6/19: patient intubated, no acute distress  6/26: patient remains intubated no acute distress  6/27: remains intubated  7/1: remains intubated    ADVANCE DIRECTIVES:    [X ] Full Code [ ] DNR  MOLST  [ ]  Living Will  [ ]   DECISION MAKER(s):  [ ] Health Care Proxy(s)  [ ] Surrogate(s)  [ ] Guardian           Name(s): Phone Number(s): wife, son Neto    BASELINE (I)ADL(s) (prior to admission):  [place]: [ ]Total  [ ] Moderate [ ]Dependent  Palliative Performance Status Version 2:         %    http://npcrc.org/files/news/palliative_performance_scale_ppsv2.pdf    Allergies    Bactrim (Unknown)  sulfa drugs (Unknown)    Intolerances    MEDICATIONS  (STANDING):  albuterol/ipratropium for Nebulization 3 milliLiter(s) Nebulizer every 6 hours  aMIOdarone    Tablet 200 milliGRAM(s) Oral daily  apixaban 5 milliGRAM(s) Enteral Tube every 12 hours  atorvastatin 40 milliGRAM(s) Oral at bedtime  bumetanide Injectable 1 milliGRAM(s) IV Push two times a day  chlorhexidine 0.12% Liquid 15 milliLiter(s) Oral Mucosa every 12 hours  chlorhexidine 2% Cloths 1 Application(s) Topical <User Schedule>  clopidogrel Tablet 75 milliGRAM(s) Enteral Tube daily  dextrose 5%. 1000 milliLiter(s) (100 mL/Hr) IV Continuous <Continuous>  dextrose 5%. 1000 milliLiter(s) (50 mL/Hr) IV Continuous <Continuous>  dextrose 50% Injectable 25 Gram(s) IV Push once  dextrose 50% Injectable 12.5 Gram(s) IV Push once  dextrose 50% Injectable 25 Gram(s) IV Push once  ezetimibe 10 milliGRAM(s) Oral daily  fentaNYL   Infusion. 0.501 MICROgram(s)/kG/Hr (4.17 mL/Hr) IV Continuous <Continuous>  insulin glargine Injectable (LANTUS) 34 Unit(s) SubCutaneous at bedtime  insulin lispro (ADMELOG) corrective regimen sliding scale   SubCutaneous every 6 hours  insulin lispro Injectable (ADMELOG) 8 Unit(s) SubCutaneous every 6 hours  meropenem  IVPB 1000 milliGRAM(s) IV Intermittent every 12 hours  meropenem  IVPB      metoprolol tartrate 25 milliGRAM(s) Oral two times a day  pantoprazole  Injectable 40 milliGRAM(s) IV Push two times a day  senna 2 Tablet(s) Oral at bedtime  vancomycin  IVPB 1250 milliGRAM(s) IV Intermittent every 24 hours    MEDICATIONS  (PRN):  acetaminophen     Tablet .. 650 milliGRAM(s) Oral every 6 hours PRN Temp greater or equal to 38C (100.4F), Mild Pain (1 - 3)  aluminum hydroxide/magnesium hydroxide/simethicone Suspension 30 milliLiter(s) Oral every 4 hours PRN Dyspepsia  dextrose Oral Gel 15 Gram(s) Oral once PRN Blood Glucose LESS THAN 70 milliGRAM(s)/deciliter  melatonin 5 milliGRAM(s) Oral at bedtime PRN Insomnia  midodrine. 5 milliGRAM(s) Oral three times a day PRN Hypotension        PRESENT SYMPTOMS: [X ]Unable to obtain due to poor mentation   Source if other than patient:  [ ]Family   [ ]Team   [ X]All review of systems negative including pain and dyspnea unless noted below    All components of pain assessment below addressed. Blank spaces indicate that the patient did/could not complete the assessment.  Pain: [ ]yes [ ]no  QOL impact -   Location -                    Aggravating factors -  Quality -  Radiation -  Timing-  Severity (0-10 scale):  Minimal acceptable level (0-10 scale):     CPOT:    https://www.sccm.org/getattachment/doj89y98-2h2v-6w2b-1y4n-6271q3083y3v/Critical-Care-Pain-Observation-Tool-(CPOT)    PAIN AD Score: 0  [email or web address].pdf (press ctrl +  left click to view)    Dyspnea:                           [ ]None[ ]Mild [ ]Moderate [ ]Severe     Respiratory Distress Observation Scale (RDOS): 2    A score of 0 to 2 signifies little or no respiratory distress, 3 signifies mild distress, scores 4 to 6 indicate moderate distress, and scores greater than 7 signify severe distress  [email or web address]    Anxiety:                             [ ]None[ ]Mild [ ]Moderate [ ]Severe   Fatigue:                             [ ]None[ ]Mild [ ]Moderate [ ]Severe   Nausea:                             [ ]None[ ]Mild [ ]Moderate [ ]Severe   Loss of appetite:              [ ]None[ ]Mild [ ]Moderate [ ]Severe   Constipation:                    [ ]None[ ]Mild [ ]Moderate [ ]Severe    Other Symptoms:    PHYSICAL EXAM:    GENERAL:  [X ] No acute distress [ ]Lethargic  [ ]Unarousable  [ ]Verbal  [ ]Non-Verbal [ ]Cachexia    BEHAVIORAL/PSYCH:  [ ]Alert and Oriented x  [ ] Anxiety [ ] Delirium [ ] Agitation [X ] Calm   EYES: [ ] No scleral icterus [ ] Scleral icterus [ ] Closed  ENMT:  [ ]Dry mouth  [ ]No external oral lesions [ X] No external ear or nose lesions  CARDIOVASCULAR:  [ ]Regular [ ]Irregular [ ]Tachy [ ]Not Tachy  [ ]Aubrey [ ] Edema [ ] No edema  PULMONARY:  [ ]Tachypnea  [ ]Audible excessive secretions [X ] No labored breathing [ ] labored breathing  GASTROINTESTINAL: [ ]Soft  [ ]Distended  [ X]Not distended [ ]Non tender [ ]Tender  MUSCULOSKELETAL: [ ]No clubbing [ ] clubbing  [ X] No cyanosis [ ] cyanosis  NEUROLOGIC: [ ]No focal deficits  [ ]Follows commands  [ ]Does not follow commands  [ ]Cognitive impairment  [ ]Dysphagia  [ ]Dysarthria  [ ]Paresis   SKIN: [ ] Jaundiced [X ] Non-jaundiced [ ]Rash [ ]No Rash [ ] Warm [ ] Dry  MISC/LINES: [ ] ET tube [ ] Trach [ ]NGT/OGT [ ]PEG [ ]Mcmahon    CRITICAL CARE:  [ ] Shock Present  [ ]Septic [ ]Cardiogenic [ ]Neurologic [ ]Hypovolemic  [ ]  Vasopressors [ ]  Inotropes   [ ]Respiratory failure present [ ]Mechanical ventilation [ ]Non-invasive ventilatory support [ ]High flow  [ ]Acute  [ ]Chronic [ ]Hypoxic  [ ]Hypercarbic [ ]Other  [ ]Other organ failure     LABS: reviewed by me,                                  8.0    19.24 )-----------( 537      ( 01 Jul 2025 06:16 )             27.4       07-01    145  |  107  |  68[HH]  ----------------------------<  240[H]  4.8   |  28  |  1.1    Ca    8.5      01 Jul 2025 06:16  Mg     2.6     06-30    TPro  5.2[L]  /  Alb  2.6[L]  /  TBili  <0.2  /  DBili  x   /  AST  27  /  ALT  27  /  AlkPhos  75  07-01                  RADIOLOGY & ADDITIONAL STUDIES: CXR personally reviewed by me:    CXR 7/1: Essentially stable bilateral opacities/effusion.    PROTEIN CALORIE MALNUTRITION PRESENT: [ ]mild [ ]moderate [ ]severe [ ]underweight [ ]morbid obesity  https://www.andeal.org/vault/2440/web/files/ONC/Table_Clinical%20Characteristics%20to%20Document%20Malnutrition-White%20JV%20et%20al%202012.pdf    Height (cm): 160 (06-14-25 @ 08:00), 162.6 (06-03-25 @ 14:33), 160 (05-30-25 @ 11:11)  Weight (kg): 83.3 (06-14-25 @ 08:00), 75.9 (06-03-25 @ 14:33), 75.7 (05-30-25 @ 11:11)  BMI (kg/m2): 32.5 (06-14-25 @ 08:00), 28.7 (06-03-25 @ 14:33), 29.6 (05-30-25 @ 11:11)    [ ]PPSV2 < or = to 30% [ ]significant weight loss  [ ]poor nutritional intake  [ ]anasarca      [ ]Artificial Nutrition          Palliative Care Spiritual/Emotional Screening Tool Question  Severity (0-4):                    OR                    [X ] Unable to determine/NA  Score of 2 or greater indicates recommendation of Chaplaincy referral  Chaplaincy Referral: [ ] Yes [ ] Refused [ ] Following     Caregiver [place]:  [ ] Yes [ ] No    OR    [x ] Unable to determine. Will assess at later time if appropriate.  Social Work Referral [ ]  Patient and Family Centered Care Referral [ ]    Anticipatory Grief Present: [ ] Yes [ ] No    OR     [ x] Unable to determine. Will assess at later time if appropriate.  Social Work Referral [ ]  Patient and Family Centered Care Referral [ ]    REFERRALS:   [ ]Chaplaincy  [ ]Hospice  [ ]Child Life  [ ]Social Work  [ ]Case management [ ]Holistic Therapy     Palliative care education provided to patient and/or family    Goals of Care Document:     ______________

## 2025-07-01 NOTE — PROGRESS NOTE ADULT - ASSESSMENT
This is a 70 year old male with past medical history of  COPD (on 2L home O2), DM 2  HFrEF 30%, CAD,  ischemic cardiomyopathy s/p CRT-D, HTN, paroxysmal A-fib (status post ablation 1/14/25) on amiodarone and  Eliquis who presented to ED w c/o LESLIE and b/l  leg swelling.    # Acute pulmonary edema  # Small pleural effusions due to pulmonary edema  # Acute on chronic HFrEF decompensated heart failure EF 31%   # Chronic Hypoxia 2/2 Pulmonary HTN multifactorial secondary to cardiomyopathy reduced EF, likely LAW, baseline COPD  - HOB @ 45 degrees  - Duonebs q 6 hrs,  - Volume contraction as tolerated  - Cardiology follow up  - Bumex 1mg BID     # VAP   - Follow up cultures  - MRSA +  - Finished course of vancomycin and cefepime  - Vancomycin and Meropenem till 7/1    Miscellaneous:  DVT prophylaxis: Eliquis  Bowel  - Feeds: NG tube  - Prophylaxis: PPI  - Regimen: None  Activity: Bedbound  Code status: Full

## 2025-07-01 NOTE — GOALS OF CARE CONVERSATION - ADVANCED CARE PLANNING - CONVERSATION DETAILS
I was able to get ahold of the family (son and spouse) in the afternoon. They were on speaker phone, and while the son did most of the talking, but the spouse could hear me and occasionally interjected. They are confused because trach had previously been brought up, and then suddenly they were told that the doctors did not want to do the trach. I explained based on conversations that the patient had with other doctors in which he informed us that he would not want to be intubated on a ventilator, that we did not feel that it was appropriate to proceed with a trach in order to keep the patient alive in such a state. I explained that the next steps would be for them to come to the hospital in order to say goodbye to the patient before we take him off the ventilator. Neto inquires when they have to come to the hospital, and I informed them that there is no deadline at this time, and they should try to coordinate a time as a family. I also explained that should the patient have a cardiac arrest before they are able to come to the hospital, that we would not be performing CPR to try to resuscitate him. They acknowledged the above.     Dr. Francisco was next to me during this call and will follow-up with the family.     Plan:   - will update code status to DNR  - timing of compassionate extubation per family/medical team    - once family is ready, can proceed with the following:      - 15 minutes prior to extubation, please provide IV glycopyrrolate 0.2mg, IV dilaudid 1mg , IV ativan 1mg     - following extubation, continue IV glycopyrrolate 0.2mg q6h standing, IV dilaudid 1mg q15min PRN for pain/dyspnea/tachypnea, IV ativan 1mg q1h PRN for anxiety/agitation      - can increase IV dilaudid to 2mg q15min PRN if 1mg is not sufficient

## 2025-07-01 NOTE — PROGRESS NOTE ADULT - SUBJECTIVE AND OBJECTIVE BOX
MONIQUE LYONS 70y Male  MRN#: [id number]   Hospital Day: 17d    HPI:   Patient is a  70 year old male with past medical history of  COPD (on 2L home O2), DM 2  HFrEF 30%, CAD,  ischemic cardiomyopathy s/p CRT-D, HTN, paroxysmal A-fib (status post ablation 1/14/25) on amiodarone and  Eliquis who presented to ED w c/o LESLIE and b/l  leg swelling.  Patient said it started 2 days ago and felt like his previous heart failure exacerbations. Patient denied n/v/f/c; denied HA lightheadedness; denied palpitations cough CP (now resolved on admission).  (14 Jun 2025 02:21)      SUBJECTIVE  Patient is a 70y old Male who presents with a chief complaint of SOB (30 Jun 2025 17:55)  Currently admitted to medicine with the primary diagnosis of CHF exacerbation        OVERNIGHT EVENTS  Remained vitally stable with no active complaints.     PHYSICAL EXAMINATION  CONSTITUTIONAL: Sedated  NEUROLOGICAL: Intubated  EYES: Pupils equal, Round and reactive to light.  CARDIAC: Normal rate, Regular rhythm.    RESPIRATORY: No wheezing, No crackles, Normal chest expansion, Not tachypneic, No use of accessory muscles  GASTROINTESTINAL: Abdomen soft, Non-tender, No guarding, + BS, OG tube in place  GENITOURINARY: Mcmahon  EXTREMITIES:  2+ Peripheral Pulses, No clubbing, cyanosis, or edema    OBJECTIVE  PAST MEDICAL & SURGICAL HISTORY  CHF (congestive heart failure)    Diabetes    CAD (coronary artery disease)    Chronic obstructive pulmonary disease (COPD)    HTN (hypertension)    Stented coronary artery    Dyslipidemia    GERD (gastroesophageal reflux disease)    Afib    CVA (cerebral vascular accident)    H/O heart artery stent    Heart valve replaced  aortic    History of Nissen fundoplication      ALLERGIES:  Bactrim (Unknown)  sulfa drugs (Unknown)      LABS:                        8.0    19.24 )-----------( 537      ( 01 Jul 2025 06:16 )             27.4     07-01    145  |  107  |  68[HH]  ----------------------------<  240[H]  4.8   |  28  |  1.1    Ca    8.5      01 Jul 2025 06:16  Mg     2.6     06-30    TPro  5.2[L]  /  Alb  2.6[L]  /  TBili  <0.2  /  DBili  x   /  AST  27  /  ALT  27  /  AlkPhos  75  07-01      Urinalysis Basic - ( 01 Jul 2025 06:16 )    Color: x / Appearance: x / SG: x / pH: x  Gluc: 240 mg/dL / Ketone: x  / Bili: x / Urobili: x   Blood: x / Protein: x / Nitrite: x   Leuk Esterase: x / RBC: x / WBC x   Sq Epi: x / Non Sq Epi: x / Bacteria: x      ABG - ( 01 Jul 2025 04:22 )  pH, Arterial: 7.42  pH, Blood: x     /  pCO2: 47    /  pO2: 102   / HCO3: 30    / Base Excess: 5.1   /  SaO2: 99.3                        VITAL SIGNS: Last 24 Hours  T(C): 37.6 (01 Jul 2025 08:08), Max: 38.3 (01 Jul 2025 05:00)  T(F): 99.7 (01 Jul 2025 08:08), Max: 100.9 (01 Jul 2025 05:00)  HR: 78 (01 Jul 2025 08:08) (78 - 101)  BP: 90/50 (01 Jul 2025 08:08) (88/54 - 177/73)  BP(mean): 65 (01 Jul 2025 08:08) (65 - 106)  RR: 20 (01 Jul 2025 08:08) (15 - 34)  SpO2: 100% (01 Jul 2025 08:08) (97% - 100%)    MEDICATIONS:  STANDING MEDICATIONS  albuterol/ipratropium for Nebulization 3 milliLiter(s) Nebulizer every 6 hours  aMIOdarone    Tablet 200 milliGRAM(s) Oral daily  apixaban 5 milliGRAM(s) Enteral Tube every 12 hours  atorvastatin 40 milliGRAM(s) Oral at bedtime  bumetanide Injectable 1 milliGRAM(s) IV Push two times a day  chlorhexidine 0.12% Liquid 15 milliLiter(s) Oral Mucosa every 12 hours  chlorhexidine 2% Cloths 1 Application(s) Topical <User Schedule>  clopidogrel Tablet 75 milliGRAM(s) Enteral Tube daily  dextrose 5%. 1000 milliLiter(s) IV Continuous <Continuous>  dextrose 5%. 1000 milliLiter(s) IV Continuous <Continuous>  dextrose 50% Injectable 25 Gram(s) IV Push once  dextrose 50% Injectable 12.5 Gram(s) IV Push once  dextrose 50% Injectable 25 Gram(s) IV Push once  ezetimibe 10 milliGRAM(s) Oral daily  fentaNYL   Infusion. 0.501 MICROgram(s)/kG/Hr IV Continuous <Continuous>  insulin glargine Injectable (LANTUS) 34 Unit(s) SubCutaneous at bedtime  insulin lispro (ADMELOG) corrective regimen sliding scale   SubCutaneous every 6 hours  insulin lispro Injectable (ADMELOG) 8 Unit(s) SubCutaneous every 6 hours  meropenem  IVPB      meropenem  IVPB 1000 milliGRAM(s) IV Intermittent every 12 hours  metoprolol tartrate 25 milliGRAM(s) Oral two times a day  pantoprazole  Injectable 40 milliGRAM(s) IV Push two times a day  senna 2 Tablet(s) Oral at bedtime  vancomycin  IVPB 1250 milliGRAM(s) IV Intermittent every 24 hours    PRN MEDICATIONS  acetaminophen     Tablet .. 650 milliGRAM(s) Oral every 6 hours PRN  aluminum hydroxide/magnesium hydroxide/simethicone Suspension 30 milliLiter(s) Oral every 4 hours PRN  dextrose Oral Gel 15 Gram(s) Oral once PRN  melatonin 5 milliGRAM(s) Oral at bedtime PRN  midodrine. 5 milliGRAM(s) Oral three times a day PRN

## 2025-07-02 NOTE — PROGRESS NOTE ADULT - SUBJECTIVE AND OBJECTIVE BOX
MONIQUE LYONS 70y Male  MRN#: [id number]     Hospital Day: 18d      SUBJECTIVE   events noted, pt seen and examined this morning, still spiking fevers                                           ----------------------------------------------------------  OBJECTIVE  PAST MEDICAL & SURGICAL HISTORY  CHF (congestive heart failure)    Diabetes    CAD (coronary artery disease)    Chronic obstructive pulmonary disease (COPD)    HTN (hypertension)    Stented coronary artery    Dyslipidemia    GERD (gastroesophageal reflux disease)    Afib    CVA (cerebral vascular accident)    H/O heart artery stent    Heart valve replaced  aortic    History of Nissen fundoplication                                              -----------------------------------------------------------  ALLERGIES:  Bactrim (Unknown)  sulfa drugs (Unknown)                                            ------------------------------------------------------------    HOME MEDICATIONS  Home Medications:  atorvastatin 40 mg oral tablet: 1 tab(s) orally once a day (25 May 2025 03:34)  clopidogrel 75 mg oral tablet: 1 tab(s) orally once a day (25 May 2025 03:34)  ezetimibe 10 mg oral tablet: 1 orally once a day (25 May 2025 03:37)  famotidine 40 mg oral tablet: 1 tab(s) orally once a day (25 May 2025 03:37)  fenofibrate 145 mg oral tablet: 1 orally once a day (25 May 2025 03:37)  melatonin 5 mg oral tablet: 1 tab(s) orally once a day (at bedtime) (25 May 2025 03:37)  metFORMIN 500 mg oral tablet: 1 tab(s) orally 2 times a day (25 May 2025 03:37)  metoprolol succinate 50 mg oral tablet, extended release: 1 tab(s) orally once a day (25 May 2025 03:37)  Soaanz 20 mg oral tablet: 1 tab(s) orally once a day (06 Jun 2025 15:53)  spironolactone 25 mg oral tablet: 1 tab(s) orally every 24 hours (25 May 2025 04:25)  Tresiba 100 units/mL subcutaneous solution: 34 unit(s) subcutaneous once a day (in the morning) (25 May 2025 04:25)                           MEDICATIONS:  STANDING MEDICATIONS  albuterol/ipratropium for Nebulization 3 milliLiter(s) Nebulizer every 6 hours  aMIOdarone    Tablet 200 milliGRAM(s) Oral daily  apixaban 5 milliGRAM(s) Enteral Tube every 12 hours  atorvastatin 40 milliGRAM(s) Oral at bedtime  bumetanide Injectable 1 milliGRAM(s) IV Push two times a day  chlorhexidine 0.12% Liquid 15 milliLiter(s) Oral Mucosa every 12 hours  chlorhexidine 2% Cloths 1 Application(s) Topical <User Schedule>  clopidogrel Tablet 75 milliGRAM(s) Enteral Tube daily  dextrose 5%. 1000 milliLiter(s) IV Continuous <Continuous>  dextrose 5%. 1000 milliLiter(s) IV Continuous <Continuous>  dextrose 50% Injectable 25 Gram(s) IV Push once  dextrose 50% Injectable 12.5 Gram(s) IV Push once  dextrose 50% Injectable 25 Gram(s) IV Push once  ezetimibe 10 milliGRAM(s) Oral daily  fentaNYL   Infusion. 0.501 MICROgram(s)/kG/Hr IV Continuous <Continuous>  insulin glargine Injectable (LANTUS) 34 Unit(s) SubCutaneous at bedtime  insulin lispro (ADMELOG) corrective regimen sliding scale   SubCutaneous every 6 hours  insulin lispro Injectable (ADMELOG) 8 Unit(s) SubCutaneous every 6 hours  metoprolol tartrate 25 milliGRAM(s) Oral two times a day  pantoprazole  Injectable 40 milliGRAM(s) IV Push daily  senna 2 Tablet(s) Oral at bedtime    PRN MEDICATIONS  acetaminophen     Tablet .. 650 milliGRAM(s) Oral every 6 hours PRN  aluminum hydroxide/magnesium hydroxide/simethicone Suspension 30 milliLiter(s) Oral every 4 hours PRN  dextrose Oral Gel 15 Gram(s) Oral once PRN  melatonin 5 milliGRAM(s) Oral at bedtime PRN  midodrine. 5 milliGRAM(s) Oral three times a day PRN                                            ------------------------------------------------------------  VITAL SIGNS: Last 24 Hours  T(C): 37.3 (02 Jul 2025 11:00), Max: 38.4 (02 Jul 2025 03:00)  T(F): 99.1 (02 Jul 2025 11:00), Max: 101.1 (02 Jul 2025 03:00)  HR: 91 (02 Jul 2025 13:00) (79 - 104)  BP: 137/75 (02 Jul 2025 13:00) (83/47 - 175/82)  BP(mean): 101 (02 Jul 2025 13:00) (60 - 118)  RR: 13 (02 Jul 2025 11:00) (13 - 46)  SpO2: 100% (02 Jul 2025 13:00) (99% - 100%)      07-01-25 @ 07:01  -  07-02-25 @ 07:00  --------------------------------------------------------  IN: 2320.5 mL / OUT: 2885 mL / NET: -564.5 mL    07-02-25 @ 07:01  -  07-02-25 @ 14:08  --------------------------------------------------------  IN: 114.9 mL / OUT: 875 mL / NET: -760.1 mL                                             --------------------------------------------------------------  LABS:                        8.8    20.51 )-----------( 578      ( 02 Jul 2025 06:01 )             29.2     07-02    147[H]  |  109  |  71[HH]  ----------------------------<  201[H]  4.8   |  28  |  1.1    Ca    8.7      02 Jul 2025 06:01    TPro  5.3[L]  /  Alb  2.7[L]  /  TBili  <0.2  /  DBili  x   /  AST  24  /  ALT  24  /  AlkPhos  64  07-02      Urinalysis Basic - ( 02 Jul 2025 06:01 )    Color: x / Appearance: x / SG: x / pH: x  Gluc: 201 mg/dL / Ketone: x  / Bili: x / Urobili: x   Blood: x / Protein: x / Nitrite: x   Leuk Esterase: x / RBC: x / WBC x   Sq Epi: x / Non Sq Epi: x / Bacteria: x      ABG - ( 01 Jul 2025 04:22 )  pH, Arterial: 7.42  pH, Blood: x     /  pCO2: 47    /  pO2: 102   / HCO3: 30    / Base Excess: 5.1   /  SaO2: 99.3                                                                  -------------------------------------------------------------  RADIOLOGY:  CXR  Xray Chest 1 View- PORTABLE-Urgent:   ACC: [id number] EXAM:  XR CHEST PORTABLE URGENT 1V   ORDERED BY: MATTHEW ALANIZ     PROCEDURE DATE:  06/30/2025          INTERPRETATION:  CLINICAL INDICATION:  intubated    COMPARISON: Chest radiograph 6/29/2025.    TECHNIQUE: AP radiograph of the chest. Patient is rotated.    FINDINGS:    Support devices: Endotracheal tube terminating above the oscar. There is   an enteric tube coursing below the diaphragm with the tip not imaged. ICD   on left side of chest, unchanged position.    Cardiac/mediastinum/hilum: Unchanged.    Lung parenchyma/Pleura: Bilateral opacities, unchanged on the right, and   slightly increased on the left compared to prior. No pneumothorax.    Skeleton/soft tissues: Unchanged.    IMPRESSION:    Bilateral opacities, unchanged on the right, and slightly increased on   the left compared to prior.    --- End of Report ---          MALISSA BANG MD; Resident Radiologist  This document has been electronically signed.  LEONARDA CAMP MD; Attending Radiologist  This document has been electronically signed. Jun 30 2025 11:17AM (06-30-25 @ 10:53)      CT                                            --------------------------------------------------------------    PHYSICAL EXAM:  GENERAL:  lying in bed, +intubated   CHEST/LUNG: vent sounds  HEART: Regular rate and rhythm; No murmurs, rubs, or gallops  ABDOMEN: Soft, nontender, nondistended  EXTREMITIES:  minimal le edema

## 2025-07-02 NOTE — CHART NOTE - NSCHARTNOTESELECT_GEN_ALL_CORE
Nutrition Services
Nutrition Services
agitation/Event Note
Event Note
Family Update/Event Note
Palliative Care/Event Note

## 2025-07-02 NOTE — PROGRESS NOTE ADULT - ASSESSMENT
IMPRESSION:    Acute on chronic HFrEF decompensated heart failure EF 31%   VAP   Anemia  Acute pulmonary edema  COPD, not in active exacerbation  pulm HTN multifactorial secondary to cardiomyopathy reduced EF, likely LAW, baseline COPD  chronic hypoxia from all the above  Small pleural effusions due to pulmonary edema      PLAN:    CNS: Daily SAT, febrile today     HEENT: Oral care    PULMONARY:  HOB @ 45 degrees. Duonebs q 6 hrs, Vent Changes: monitor for vent synchrony, f/u Peak and Plateau pressures, allow permissive hypercapnia, CXR today       CARDIOVASCULAR: TTE noted , volume contraction as tolerated, Cardiology follow up, Bumex 1mg BID     GI: GI prophylaxis. Feeding. trend Hb stable, f/u CT abdomen w/ no active bleed    RENAL:  Follow up lytes. Correct as needed, f/u nephro eval noted     INFECTIOUS DISEASE: Follow up cultures, MRSA +, finished course of vancomycin and cefepime, f/u ID reccs, trend WBC, f/u bronch cultures negative, hold Abx completed course of merem     HEMATOLOGICAL: On AC, okay per GI, f/u Us duplex LE with no DVT, repeat      ENDOCRINE:  Follow up FS. Insulin protocol if needed.    MUSCULOSKELETAL: off loading     Palliative follow up  Poor prognosis   MICU monitoring   Noted plan for comfort measures as soon as family arrives   Ethics f/u

## 2025-07-02 NOTE — PROGRESS NOTE ADULT - CRITICAL CARE SERVICES PROVIDED
Patient is critically ill, requiring critical care services.

## 2025-07-02 NOTE — PROGRESS NOTE ADULT - ASSESSMENT
This is a 70 year old male with past medical history of  COPD (on 2L home O2), DM 2  HFrEF 30%, CAD,  ischemic cardiomyopathy s/p CRT-D, HTN, paroxysmal A-fib (status post ablation 1/14/25) on amiodarone and  Eliquis who presented to ED w c/o LESLIE and b/l  leg swelling.    # Acute pulmonary edema  # Small pleural effusions due to pulmonary edema  # Acute on chronic HFrEF decompensated heart failure EF 31%   # Chronic Hypoxia 2/2 Pulmonary HTN multifactorial secondary to cardiomyopathy reduced EF, likely LAW, baseline COPD  - HOB @ 45 degrees  - Duonebs q 6 hrs,  - Volume contraction as tolerated  - Cardiology follow up  - Bumex 1mg BID   - Plan for compassionate exubation.    # VAP   - Follow up cultures  - MRSA +  - Finished course of vancomycin and cefepime  - Vancomycin and Meropenem till 7/1    Miscellaneous:  DVT prophylaxis: Eliquis  Bowel  - Feeds: NG tube  - Prophylaxis: PPI  - Regimen: None  Activity: Bedbound  Code status: Full

## 2025-07-02 NOTE — PROGRESS NOTE ADULT - ASSESSMENT
This is a 70 year old male with past medical history of  COPD (on 2L home O2), DM 2  HFrEF 30%, CAD,  ischemic cardiomyopathy s/p CRT-D, HTN, paroxysmal A-fib (status post ablation 1/14/25) on amiodarone and  Eliquis who presented to ED w c/o LESLIE and b/l  leg swelling.    Acute on chronic hypoxic respiratory failure   Acute on chronic HFrEF decompensated heart failure EF 31%   VAP   Chronic leukocytosis   Persistent fevers   Anemia  Splenic infarct  Possible colitis on CT, possible ischemic colitis?  Acute pulmonary edema  COPD, not in active exacerbation, on home O2   pulm HTN multifactorial secondary to cardiomyopathy reduced EF, likely LAW, baseline COPD  HTN, HLD, CAD s/p MANN to mid LAD in 2021, ICM s/p AICD, AF, Bioprosthetic Aortic valve  CKD 3, DM, Lung nodule    -poorly weaning, daily SAT, vent and sedation management per CC  -S/p BAL 6/27- Thick copious secretions from BL lung fields reported. Cultures Normal respiratory magda.   -finished course of vanc and jaycee yesterday 7/1  -still spiking fevers even while on jaycee and vanc, will discuss with ID   -bumex 1mg bid per CC, monitor renal function, having decent UO   -cardio f/u  -trend wbc and temps    no family at bedside today and have not been at bedside this whole week.   Was present during palliative's conversation over the phone with son Reena (HCP) and pt's wife-see GOC note 7/1. Family agreeable and understands that he is DNR. They also understand that we are unable to wean him from the vent and he cannot be trach'd in accordance with the patients previously documented GOC/MOLST and the plan would have to be for compassionate palliative extubation. Family will discuss amongst themselves and decide on a time when they will come for palliative extubation.  If family does not come or call today, will plan to call son renea tomorrow to follow up.   This case was coordinated and discussed with palliative, ethics, and legal teams.

## 2025-07-02 NOTE — PROGRESS NOTE ADULT - SUBJECTIVE AND OBJECTIVE BOX
MONIQUE LYONS 70y Male  MRN#: [id number]   Hospital Day: 18d    HPI:   Patient is a  70 year old male with past medical history of  COPD (on 2L home O2), DM 2  HFrEF 30%, CAD,  ischemic cardiomyopathy s/p CRT-D, HTN, paroxysmal A-fib (status post ablation 1/14/25) on amiodarone and  Eliquis who presented to ED w c/o LESLIE and b/l  leg swelling.  Patient said it started 2 days ago and felt like his previous heart failure exacerbations. Patient denied n/v/f/c; denied HA lightheadedness; denied palpitations cough CP (now resolved on admission).  (14 Jun 2025 02:21)      SUBJECTIVE  Patient is a 70y old Male who presents with a chief complaint of SOB (01 Jul 2025 12:53)  Currently admitted to medicine with the primary diagnosis of CHF exacerbation        OVERNIGHT EVENTS  Remained vitally stable with no active complaints.     PHYSICAL EXAMINATION  GENERAL:  lying in bed, +intubated   CHEST/LUNG: vent sounds  HEART: IRRegular  ABDOMEN: Soft, nontender, nondistended  EXTREMITIES:  minimal LE edema     OBJECTIVE  PAST MEDICAL & SURGICAL HISTORY  CHF (congestive heart failure)    Diabetes    CAD (coronary artery disease)    Chronic obstructive pulmonary disease (COPD)    HTN (hypertension)    Stented coronary artery    Dyslipidemia    GERD (gastroesophageal reflux disease)    Afib    CVA (cerebral vascular accident)    H/O heart artery stent    Heart valve replaced  aortic    History of Nissen fundoplication      ALLERGIES:  Bactrim (Unknown)  sulfa drugs (Unknown)      LABS:                        8.8    20.51 )-----------( 578      ( 02 Jul 2025 06:01 )             29.2     07-02    147[H]  |  109  |  71[HH]  ----------------------------<  201[H]  4.8   |  28  |  1.1    Ca    8.7      02 Jul 2025 06:01    TPro  5.3[L]  /  Alb  2.7[L]  /  TBili  <0.2  /  DBili  x   /  AST  24  /  ALT  24  /  AlkPhos  64  07-02      Urinalysis Basic - ( 02 Jul 2025 06:01 )    Color: x / Appearance: x / SG: x / pH: x  Gluc: 201 mg/dL / Ketone: x  / Bili: x / Urobili: x   Blood: x / Protein: x / Nitrite: x   Leuk Esterase: x / RBC: x / WBC x   Sq Epi: x / Non Sq Epi: x / Bacteria: x      ABG - ( 01 Jul 2025 04:22 )  pH, Arterial: 7.42  pH, Blood: x     /  pCO2: 47    /  pO2: 102   / HCO3: 30    / Base Excess: 5.1   /  SaO2: 99.3                        VITAL SIGNS: Last 24 Hours  T(C): 38 (02 Jul 2025 07:05), Max: 38.4 (02 Jul 2025 03:00)  T(F): 100.4 (02 Jul 2025 07:05), Max: 101.1 (02 Jul 2025 03:00)  HR: 95 (02 Jul 2025 06:00) (73 - 104)  BP: 146/74 (02 Jul 2025 06:00) (83/47 - 175/82)  BP(mean): 104 (02 Jul 2025 06:00) (60 - 118)  RR: 19 (02 Jul 2025 07:05) (17 - 46)  SpO2: 100% (02 Jul 2025 06:00) (99% - 100%)    MEDICATIONS:  STANDING MEDICATIONS  albuterol/ipratropium for Nebulization 3 milliLiter(s) Nebulizer every 6 hours  aMIOdarone    Tablet 200 milliGRAM(s) Oral daily  apixaban 5 milliGRAM(s) Enteral Tube every 12 hours  atorvastatin 40 milliGRAM(s) Oral at bedtime  bumetanide Injectable 1 milliGRAM(s) IV Push two times a day  chlorhexidine 0.12% Liquid 15 milliLiter(s) Oral Mucosa every 12 hours  chlorhexidine 2% Cloths 1 Application(s) Topical <User Schedule>  clopidogrel Tablet 75 milliGRAM(s) Enteral Tube daily  dextrose 5%. 1000 milliLiter(s) IV Continuous <Continuous>  dextrose 5%. 1000 milliLiter(s) IV Continuous <Continuous>  dextrose 50% Injectable 25 Gram(s) IV Push once  dextrose 50% Injectable 12.5 Gram(s) IV Push once  dextrose 50% Injectable 25 Gram(s) IV Push once  ezetimibe 10 milliGRAM(s) Oral daily  fentaNYL   Infusion. 0.501 MICROgram(s)/kG/Hr IV Continuous <Continuous>  insulin glargine Injectable (LANTUS) 34 Unit(s) SubCutaneous at bedtime  insulin lispro (ADMELOG) corrective regimen sliding scale   SubCutaneous every 6 hours  insulin lispro Injectable (ADMELOG) 8 Unit(s) SubCutaneous every 6 hours  meropenem  IVPB 1000 milliGRAM(s) IV Intermittent every 12 hours  meropenem  IVPB      metoprolol tartrate 25 milliGRAM(s) Oral two times a day  pantoprazole  Injectable 40 milliGRAM(s) IV Push daily  senna 2 Tablet(s) Oral at bedtime    PRN MEDICATIONS  acetaminophen     Tablet .. 650 milliGRAM(s) Oral every 6 hours PRN  aluminum hydroxide/magnesium hydroxide/simethicone Suspension 30 milliLiter(s) Oral every 4 hours PRN  dextrose Oral Gel 15 Gram(s) Oral once PRN  melatonin 5 milliGRAM(s) Oral at bedtime PRN  midodrine. 5 milliGRAM(s) Oral three times a day PRN

## 2025-07-02 NOTE — PROGRESS NOTE ADULT - SUBJECTIVE AND OBJECTIVE BOX
Patient is a 70y old  Male who presents with a chief complaint of SOB (02 Jul 2025 08:19)        Over Night Events: No acute events noted, still febrile         ROS:     All ROS are negative except HPI         PHYSICAL EXAM    ICU Vital Signs Last 24 Hrs  T(C): 37.8 (02 Jul 2025 08:00), Max: 38.4 (02 Jul 2025 03:00)  T(F): 100 (02 Jul 2025 08:00), Max: 101.1 (02 Jul 2025 03:00)  HR: 85 (02 Jul 2025 09:42) (79 - 104)  BP: 107/59 (02 Jul 2025 08:00) (83/47 - 175/82)  BP(mean): 78 (02 Jul 2025 08:00) (60 - 118)  ABP: --  ABP(mean): --  RR: 19 (02 Jul 2025 07:05) (17 - 46)  SpO2: 100% (02 Jul 2025 09:42) (99% - 100%)    O2 Parameters below as of 02 Jul 2025 07:00  Patient On (Oxygen Delivery Method): ventilator    O2 Concentration (%): 40      CONSTITUTIONAL:  NAD    ENT:   Airway patent,   Mouth with normal mucosa.     EYES:   Pupils equal,   Round and reactive to light.    CARDIAC:   Normal rate,   Regular rhythm.      Vascular:  Normal systolic impulse  No Carotid bruits    RESPIRATORY:   No wheezing  Bilateral BS    GASTROINTESTINAL:  Abdomen soft,   Non-tender,   No guarding,   + BS    MUSCULOSKELETAL:   Range of motion is not limited,  No clubbing, cyanosis    NEUROLOGICAL:   Intubated    No motor  deficits.    SKIN:   Skin normal color for race,   Warm and dry and intact.   No evidence of rash.      07-01-25 @ 07:01  -  07-02-25 @ 07:00  --------------------------------------------------------  IN:    Enteral Tube Flush: 950 mL    FentaNYL: 244.5 mL    IV PiggyBack: 250 mL    Peptamen A.F.: 876 mL  Total IN: 2320.5 mL    OUT:    Indwelling Catheter - Urethral (mL): 2885 mL  Total OUT: 2885 mL    Total NET: -564.5 mL      07-02-25 @ 07:01  -  07-02-25 @ 09:56  --------------------------------------------------------  IN:    FentaNYL: 24.9 mL  Total IN: 24.9 mL    OUT:    Indwelling Catheter - Urethral (mL): 325 mL  Total OUT: 325 mL    Total NET: -300.1 mL          LABS:                            8.8    20.51 )-----------( 578      ( 02 Jul 2025 06:01 )             29.2                                               07-02    147[H]  |  109  |  71[HH]  ----------------------------<  201[H]  4.8   |  28  |  1.1    Ca    8.7      02 Jul 2025 06:01    TPro  5.3[L]  /  Alb  2.7[L]  /  TBili  <0.2  /  DBili  x   /  AST  24  /  ALT  24  /  AlkPhos  64  07-02                                             Urinalysis Basic - ( 02 Jul 2025 06:01 )    Color: x / Appearance: x / SG: x / pH: x  Gluc: 201 mg/dL / Ketone: x  / Bili: x / Urobili: x   Blood: x / Protein: x / Nitrite: x   Leuk Esterase: x / RBC: x / WBC x   Sq Epi: x / Non Sq Epi: x / Bacteria: x                                                  LIVER FUNCTIONS - ( 02 Jul 2025 06:01 )  Alb: 2.7 g/dL / Pro: 5.3 g/dL / ALK PHOS: 64 U/L / ALT: 24 U/L / AST: 24 U/L / GGT: x                                                                                               Mode: AC/ CMV (Assist Control/ Continuous Mandatory Ventilation)  RR (machine): 20  TV (machine): 440  FiO2: 40  PEEP: 8  ITime: 1  MAP: 10  PIP: 18                                      ABG - ( 01 Jul 2025 04:22 )  pH, Arterial: 7.42  pH, Blood: x     /  pCO2: 47    /  pO2: 102   / HCO3: 30    / Base Excess: 5.1   /  SaO2: 99.3                MEDICATIONS  (STANDING):  albuterol/ipratropium for Nebulization 3 milliLiter(s) Nebulizer every 6 hours  aMIOdarone    Tablet 200 milliGRAM(s) Oral daily  apixaban 5 milliGRAM(s) Enteral Tube every 12 hours  atorvastatin 40 milliGRAM(s) Oral at bedtime  bumetanide Injectable 1 milliGRAM(s) IV Push two times a day  chlorhexidine 0.12% Liquid 15 milliLiter(s) Oral Mucosa every 12 hours  chlorhexidine 2% Cloths 1 Application(s) Topical <User Schedule>  clopidogrel Tablet 75 milliGRAM(s) Enteral Tube daily  dextrose 5%. 1000 milliLiter(s) (100 mL/Hr) IV Continuous <Continuous>  dextrose 5%. 1000 milliLiter(s) (50 mL/Hr) IV Continuous <Continuous>  dextrose 50% Injectable 25 Gram(s) IV Push once  dextrose 50% Injectable 12.5 Gram(s) IV Push once  dextrose 50% Injectable 25 Gram(s) IV Push once  ezetimibe 10 milliGRAM(s) Oral daily  fentaNYL   Infusion. 0.501 MICROgram(s)/kG/Hr (4.17 mL/Hr) IV Continuous <Continuous>  insulin glargine Injectable (LANTUS) 34 Unit(s) SubCutaneous at bedtime  insulin lispro (ADMELOG) corrective regimen sliding scale   SubCutaneous every 6 hours  insulin lispro Injectable (ADMELOG) 8 Unit(s) SubCutaneous every 6 hours  meropenem  IVPB 1000 milliGRAM(s) IV Intermittent every 12 hours  meropenem  IVPB      metoprolol tartrate 25 milliGRAM(s) Oral two times a day  pantoprazole  Injectable 40 milliGRAM(s) IV Push daily  senna 2 Tablet(s) Oral at bedtime    MEDICATIONS  (PRN):  acetaminophen     Tablet .. 650 milliGRAM(s) Oral every 6 hours PRN Temp greater or equal to 38C (100.4F), Mild Pain (1 - 3)  aluminum hydroxide/magnesium hydroxide/simethicone Suspension 30 milliLiter(s) Oral every 4 hours PRN Dyspepsia  dextrose Oral Gel 15 Gram(s) Oral once PRN Blood Glucose LESS THAN 70 milliGRAM(s)/deciliter  melatonin 5 milliGRAM(s) Oral at bedtime PRN Insomnia  midodrine. 5 milliGRAM(s) Oral three times a day PRN Hypotension      New X-rays reviewed:                                                                                  ECHO

## 2025-07-02 NOTE — PROGRESS NOTE ADULT - ATTENDING COMMENTS
patient seen and examined agree above note   hold bumex for now   continue free water     D5w 50cc/ hr follow bmp   follow BMP NA at night   do SAT   will proceed with bronch/ BAL  and then start zosyn   albuterol Q4 hrs standing   send DTA for cx   follow ID
70 year old male with media l history of CKD3,  COPD on 2L home O2, diabetes, chronic  HFrEF  s/p CRT-D, AVR, hypertension, paroxysmal A-fib (status post ablation 1/14/25)  CAD admitted  for dyspnea on exertion and leg swelling.     acute respiratory failure / acute on chronic HFrEF / possible aspiration pneumonia     - Bumex on hold today    - EF ~30%   - continue IV antibiotics as per ID   - cardiology and pulmonary following   - 50 minutes total spent today on patient care
70 year old male with media l history of CKD3,  COPD on 2L home O2, diabetes, chronic  HFrEF  s/p CRT-D, AVR, hypertension, paroxysmal A-fib (status post ablation 1/14/25)  CAD admitted  for dyspnea on exertion and leg swelling.     acute respiratory failure / sepsis  / aspiration pneumonia / fluid overload     - completed antibiotic course   - Bumex as per cardiology   - maintain even to negative fluid balance   - SAT and if tolerates -> SBT   - 50 minutes total spent today on patient care .
Attending Statement: I have personally performed a face to face diagnostic evaluation on this patient. The patient is suffering from:  Acute on chronic HFrEF decompensated heart failure EF 31%   Acute pulmonary edema  COPD, not in active exacerbation  pulm HTN multifactorial secondary to cardiomyopathy reduced EF, likely LAW, baseline COPD  chronic hypoxia from all the above  B/L small pleural effusions to  pulmonary edema  I have made amendments to the documentation where necessary. I have personally seen and examined this patient.  I have fully participated in the care of this patient.  I have reviewed all pertinent clinical information, including history, physical exam, plan and note.
Attending Statement: I have personally performed a face to face diagnostic evaluation on this patient. The patient is suffering from:  Acute on chronic HFrEF decompensated heart failure EF 31%   Anemia   Acute pulmonary edema  COPD, not in active exacerbation  pulm HTN multifactorial secondary to cardiomyopathy reduced EF, likely LAW, baseline COPD  chronic hypoxia from all the above  B/L small pleural effusions d  I have made amendments to the documentation where necessary. I have personally seen and examined this patient.  I have fully participated in the care of this patient.  I have reviewed all pertinent clinical information, including history, physical exam, plan and note.    The patient is critically ill with a high probability of deterioration.
Attending Statement: I have personally performed a face to face diagnostic evaluation on this patient. The patient is suffering from:  Acute on chronic HFrEF decompensated heart failure EF 31%   VAP   Anemia  Acute pulmonary edema  COPD, not in active exacerbation  pulm HTN multifactorial secondary to cardiomyopathy reduced EF, likely LAW, baseline COPD  chronic hypoxia from all the above  Small pleural effusions due to pulmonary edema  I have made amendments to the documentation where necessary. I have personally seen and examined this patient.  I have fully participated in the care of this patient.  I have reviewed all pertinent clinical information, including history, physical exam, plan and note.
Attending Statement: I have personally performed a face to face diagnostic evaluation on this patient. The patient is suffering from:  Acute on chronic HFrEF decompensated heart failure EF 31%   Anemia   Acute pulmonary edema  COPD, not in active exacerbation  pulm HTN multifactorial secondary to cardiomyopathy reduced EF, likely LAW, baseline COPD  chronic hypoxia from all the above  B/L small pleural effusions due to  pulmonary edema  I have made amendments to the documentation where necessary. I have personally seen and examined this patient.  I have fully participated in the care of this patient.  I have reviewed all pertinent clinical information, including history, physical exam, plan and note.
Attending Statement: I have personally performed a face to face diagnostic evaluation on this patient. The patient is suffering from:  Acute on chronic HFrEF decompensated heart failure EF 31%   VAP   Anemia  Acute pulmonary edema  COPD, not in active exacerbation  pulm HTN multifactorial secondary to cardiomyopathy reduced EF, likely LAW, baseline COPD  chronic hypoxia from all the above  Small pleural effusions due to pulmonary edema  I have made amendments to the documentation where necessary. I have personally seen and examined this patient.  I have fully participated in the care of this patient.  I have reviewed all pertinent clinical information, including history, physical exam, plan and note.
patient seen and examined agree above note   bumex 1mg today   free water 250cc Q4 hrs   follow BMP   do SAT / SBT
patient seen and examined agree above note   hold bumex for now   continue free water     D5w 50cc/ hr follow bmp   follow BMP NA at night   do SAT   will proceed with bronch/ BAL  and then start zosyn   albuterol Q4 hrs standing   send DTA for cx   follow ID
resp failure  - poorly weaning  - spoke extensivelt today with Dr Cam from ethics, Dr Ramos, and   - All in agreement that patients wishes to be DNR/DNI should be honored at this time.  - Did attempt outreach to son who is healthcare proxy since wife defers decisions for her  to him. Son could not be reached on phone today and was not present at bedside  - Will discuss with son that DNR will be reinstated in accordance with the wishes of the patient. Will hold off until son able to be made aware of this.    hypernatremia  - hypotonic fluids    I have spent 50 minutes of time on this encounter which excludes teaching time and other reported services
resp failure  - poorly weaning  - will consult ethics since unclear if intubation was within patients own desire for treatment    hypernatremia  - hypotonic fluids    I have spent 50 minutes of time on this encounter which excludes teaching time and other reported services
patient seen and examined agree above note   hold bumex for now   continue free water     D5w 50cc/ hr follow bmp   follow BMP NA at night   do SAT   will proceed with bronch/ BAL  and then start zosyn   albuterol Q4 hrs standing   send DTA for cx   follow ID
Attending Statement: I have personally performed a face to face diagnostic evaluation on this patient. The patient is suffering from:  Acute on chronic HFrEF decompensated heart failure EF 31%   VAP   Anemia  Acute pulmonary edema  COPD, not in active exacerbation  pulm HTN multifactorial secondary to cardiomyopathy reduced EF, likely LAW, baseline COPD  chronic hypoxia from all the above  Small pleural effusions due to pulmonary edema  I have made amendments to the documentation where necessary. I have personally seen and examined this patient.  I have fully participated in the care of this patient.  I have reviewed all pertinent clinical information, including history, physical exam, plan and note.
IMPRESSION:    Acute on chronic HFrEF decompensated heart failure EF 31%   Acute pulmonary edema  COPD, not in active exacerbation  pulm HTN multifactorial secondary to cardiomyopathy reduced EF, likely LAW, baseline COPD  chronic hypoxia from all the above  B/L small pleural effusions to  pulmonary edema
patient seen and examined agree above note   bumex 1mg Q12 hrs   free water 250cc Q4 hrs   follow BMP   do SAT / SBT  send DTA for cx   follow ID   agree with holding precedex
patient seen and examined agree above note   hold bumex for now   free water 250cc Q4 hrs   start D5w 70cc/ hr   follow BMP NA at night   do SAT / SBT  send DTA for cx   follow ID   off precedex   do ekg if QTC stable start seroquel
patient seen and examined agree above note   hold bumex for now   free water 250cc Q4 hrs     D5w 70cc/ hr follow bmp   follow BMP NA at night   do SAT   albuterol Q4 hrs standing   send DTA for cx   follow ID   might need to go back on precedex

## 2025-07-02 NOTE — PROGRESS NOTE ADULT - PROVIDER SPECIALTY LIST ADULT
Cardiology
Critical Care
Gastroenterology
Hospitalist
Infectious Disease
Internal Medicine
Palliative Care
Pulmonology
Cardiology
Critical Care
Hospitalist
Infectious Disease
Internal Medicine
Cardiology
Critical Care
Gastroenterology
Gastroenterology
Hospitalist
Infectious Disease
Nephrology
Critical Care
Gastroenterology
Hospitalist
Infectious Disease
Internal Medicine
Pulmonology
Critical Care
Pulmonology
Pulmonology
Palliative Care

## 2025-07-02 NOTE — CHART NOTE - NSCHARTNOTEFT_GEN_A_CORE
Palliative Care chart note    Palliative care consult received electronically. Chart reviewed. Will plan to see patient for full consult on Monday.    Please call [phone] PRN for any questions, needs, or concerns prior to that time.
Provided clinical updates to patient's family Jae and Neto. They would prefer if decisions could be made as a group regarding any Goals-Of-Care matters. Spoke with Dr. Ramos as well.    From the conversation, if Neto, the HCP, cannot be reached, to please reach Jae as second line. His phone number is [phone]
Pt with acute respiratory failure  Attending Dr. Loyd informed wife regarding the patient current medical status   after a long conversation between attending  and wife, I  was place on the phone to witness decision making by the wife   Wife had decided pt to be Full code with understanding of intubation and placed on mechanical ventilator   Wife aware pt current code status DNR/DNI , reports pt has been intubated in the past , and wants pt to be intubated now
Spoke with family at bedside, pt's son and wife at bedside along with multiple family members.  They feel ready for palliative extubation, next steps and what to expect were briefly discussed and explained.  MOLST form updated to reflect CMO status signed by Neto (HCP).  Extubation orders recommended by palliative were ordered.  RT and  contacted prior to extubation
This was a telehealth visit with Dr. Ramos. Pt is CMO, Sedated and looked comfortable. No family were at bedside. I was an empathetic pastoral presence. I will follow up for support.
Intermittently agitated yelling at staff; refusing care intermittently   Episode of SOB w self report of panic. Patient refused meds for panic  Requested bipap.
Registered Dietitian Follow-Up     Patient Profile Reviewed                           Yes X[]   No []     Nutrition History Previously Obtained        Yes []  No []       Pertinent  Information:     pt is 70 year old male with hx of COPD, DM, HFrEF (30 % EF), CAD, ischemic cardiomyopathy s/p CRT-D, HTN, p afib p/w LESLIE and B/L LE swelling, non-compliance with meds. pt admitted with CHF exacerbation and chronic leukocytosis. s/p  RR 6/14 intubation warranted upgraded to CCU. Bumex held 2/2 worsening kidney fxn.  NSTEMI, + occult blood     Diet order:   Diet, NPO with Tube Feed:   Tube Feeding Modality: Orogastric  Peptamen A.F. Formula (PEPTAMENAFRTH)  Total Volume for 24 Hours (mL): 1314  Intermittent  Starting Tube Feed Rate {mL per Hour}: 30  Until Goal Tube Feed Rate (mL per Hour): 73  Tube Feeding Hours ON: 18  Tube Feeding OFF (Hours): 6  Tube Feed Start Time: 12:00  Free Water Flush  Free Water Flush Instructions:  150 ml pre and post feed (25 @ 09:20) [Active]      HT: 160 cm    Daily Weight in k (-21), Weight in k.4 (-20), Weight in k.9 (-19), Weight in k.6 (-18), Weight in k.8 (-17), Weight in k.4 (-16)  % Weight Change    MEDICATIONS  (STANDING):  aMIOdarone    Tablet 200 milliGRAM(s) Oral daily  apixaban 5 milliGRAM(s) Enteral Tube every 12 hours  atorvastatin 40 milliGRAM(s) Oral at bedtime  cefepime   IVPB 2000 milliGRAM(s) IV Intermittent every 12 hours  dexMEDEtomidine Infusion 0.05 MICROgram(s)/kG/Hr (1.04 mL/Hr) IV Continuous <Continuous>  dextrose 5%. 1000 milliLiter(s) (100 mL/Hr) IV Continuous <Continuous>  dextrose 5%. 1000 milliLiter(s) (50 mL/Hr) IV Continuous <Continuous>  ezetimibe 10 milliGRAM(s) Oral daily  fentaNYL   Infusion. 0.5 MICROgram(s)/kG/Hr (4.17 mL/Hr) IV Continuous <Continuous>  glucagon  Injectable 1 milliGRAM(s) IntraMuscular once  insulin glargine Injectable (LANTUS) 34 Unit(s) SubCutaneous at bedtime  insulin lispro (ADMELOG) corrective regimen sliding scale   SubCutaneous every 6 hours  insulin lispro Injectable (ADMELOG) 8 Unit(s) SubCutaneous every 6 hours  pantoprazole  Injectable 40 milliGRAM(s) IV Push two times a day  propofol Infusion 5 MICROgram(s)/kG/Min (2.54 mL/Hr) IV Continuous <Continuous>  vancomycin  IVPB        MEDICATIONS  (PRN):  acetaminophen     Tablet .. 650 milliGRAM(s) Oral every 6 hours PRN Temp greater or equal to 38C (100.4F), Mild Pain (1 - 3)  albuterol/ipratropium for Nebulization 3 milliLiter(s) Nebulizer every 6 hours PRN Shortness of Breath and/or Wheezing  aluminum hydroxide/magnesium hydroxide/simethicone Suspension 30 milliLiter(s) Oral every 4 hours PRN Dyspepsia  dextrose Oral Gel 15 Gram(s) Oral once PRN Blood Glucose LESS THAN 70 milliGRAM(s)/deciliter  guaiFENesin Oral Liquid (Sugar-Free) 200 milliGRAM(s) Oral every 6 hours PRN Cough  melatonin 5 milliGRAM(s) Oral at bedtime PRN Insomnia  midodrine. 5 milliGRAM(s) Oral three times a day PRN Hypotension  ondansetron Injectable 4 milliGRAM(s) IV Push every 8 hours PRN Nausea and/or Vomiting    Pertinent Labs:  @ 06:23: Na 149[H], BUN 60[H], Cr 1.3, [H], K+ 5.1[H], Phos --, Mg 2.4, Alk Phos 53, ALT/SGPT 9, AST/SGOT 13, HbA1c --    Finger Sticks:  POCT Blood Glucose.: 188 mg/dL ( @ 17:20)  POCT Blood Glucose.: 150 mg/dL ( @ 11:47)  POCT Blood Glucose.: 250 mg/dL ( @ 06:26)  POCT Blood Glucose.: 240 mg/dL ( @ 22:52)    Physical Findings:  - Appearance: intubated to vent   - GI function: +BS, liquid BM   - Tubes: OGT  - Oral/Mouth cavity:  - Skin: intact  - Edema: generalized edema B/L hand     Nutrition Requirements:  Weight Used: 78 kgs      Estimated Energy Needs    0152-0765 kcals (20-25 kcal/kg/BW)  94-109g protein (1.2-1.4g/kg/BW)  fluid needs as per MD     Nutrient Intake: present EN regimen provides pt with 1650 kcals, 105g protein, 1117+300 ml free water meeting >80% of estimated needs    [] Previous Nutrition Diagnosis:            [X] Ongoing          [] Resolved    alt nutrition related labs remains     Goal/Expected Outcome: pt to tolerate EN and meet at least 80% of estimated needs within 4 days      Indicator/Monitoring: tolerance to EN, labs/meds, bowel fxn, NFPF    Recommendation:   -maintain on present feeds  high risk
Registered Dietitian Follow-Up     Patient Profile Reviewed                           Yes [X]   No []     Nutrition History Previously Obtained        Yes []  No []       Pertinent  Information: pt is 70 year old male with hx of COPD, DM, HFrEF (30 % EF), CAD, ischemic cardiomyopathy s/p CRT-D, HTN, p afib p/w LESLIE and B/L LE swelling, non-compliance with meds. pt admitted with CHF exacerbation and chronic leukocytosis. s/p  RR 6/14 intubation warranted upgraded to CCU. Bumex held 2/2 worsening kidney fxn.  NSTEMI, + occult blood, possible colitis.     s/p bronch today     H20 flushes increased by nephrology to 300 ml q 3 hrs.     Diet order:   Diet, NPO with Tube Feed:   Tube Feeding Modality: Orogastric  Peptamen A.F. Formula (PEPTAMENAFRTH)  Total Volume for 24 Hours (mL): 1314  Intermittent  Starting Tube Feed Rate {mL per Hour}: 30  Until Goal Tube Feed Rate (mL per Hour): 73  Tube Feeding Hours ON: 18  Tube Feeding OFF (Hours): 6  Tube Feed Start Time: 12:00  Free Water Flush  Bolus   Total Volume per Flush (mL): 300   Frequency: Every 3 Hours  Free Water Flush Instructions:  300 mL free water flushes every 3  hours (- @ 10:56) [Active]      Daily Weight in k.5 (), Weight in k (), Weight in k (), Weight in k.1 (), Weight in k.6 (), Weight in k (), Weight in k ()  % Weight Change    MEDICATIONS  (STANDING):  albuterol/ipratropium for Nebulization 3 milliLiter(s) Nebulizer every 6 hours  aMIOdarone    Tablet 200 milliGRAM(s) Oral daily  apixaban 5 milliGRAM(s) Enteral Tube every 12 hours  atorvastatin 40 milliGRAM(s) Oral at bedtime  clopidogrel Tablet 75 milliGRAM(s) Enteral Tube daily  ezetimibe 10 milliGRAM(s) Oral daily  fentaNYL   Infusion. 0.501 MICROgram(s)/kG/Hr (4.17 mL/Hr) IV Continuous <Continuous>  glucagon  Injectable 1 milliGRAM(s) IntraMuscular once  insulin glargine Injectable (LANTUS) 34 Unit(s) SubCutaneous at bedtime  insulin lispro (ADMELOG) corrective regimen sliding scale   SubCutaneous every 6 hours  insulin lispro Injectable (ADMELOG) 8 Unit(s) SubCutaneous every 6 hours  meropenem  IVPB      metoprolol tartrate 25 milliGRAM(s) Oral two times a day  pantoprazole  Injectable 40 milliGRAM(s) IV Push two times a day  senna 2 Tablet(s) Oral at bedtime  vancomycin  IVPB 1250 milliGRAM(s) IV Intermittent every 24 hours    MEDICATIONS  (PRN):  acetaminophen     Tablet .. 650 milliGRAM(s) Oral every 6 hours PRN Temp greater or equal to 38C (100.4F), Mild Pain (1 - 3)  aluminum hydroxide/magnesium hydroxide/simethicone Suspension 30 milliLiter(s) Oral every 4 hours PRN Dyspepsia  dextrose Oral Gel 15 Gram(s) Oral once PRN Blood Glucose LESS THAN 70 milliGRAM(s)/deciliter  guaiFENesin Oral Liquid (Sugar-Free) 200 milliGRAM(s) Oral every 6 hours PRN Cough  melatonin 5 milliGRAM(s) Oral at bedtime PRN Insomnia  midodrine. 5 milliGRAM(s) Oral three times a day PRN Hypotension  ondansetron Injectable 4 milliGRAM(s) IV Push every 8 hours PRN Nausea and/or Vomiting    Pertinent Labs:  @ 06:06: Na 146, BUN 82[HH], Cr 1.4, [H], K+ 4.6, Phos --, Mg 2.6[H], Alk Phos 57, ALT/SGPT 25, AST/SGOT 31, HbA1c --    Finger Sticks:  POCT Blood Glucose.: 143 mg/dL ( @ 18:22)  POCT Blood Glucose.: 127 mg/dL ( @ 16:46)  POCT Blood Glucose.: 180 mg/dL ( @ 11:11)  POCT Blood Glucose.: 264 mg/dL ( @ 06:37)  POCT Blood Glucose.: 304 mg/dL ( @ 01:34)    Physical Findings:  - Appearance: intubated to vent   - GI function: +BS, BM noted   - Tubes: OGT   - Oral/Mouth cavity:  - Skin: intact   - Edema: L hand + 2, generalized + 1    Nutrition Requirements:  Weight Used: 78 kgs      Estimated Energy Needs    6277-3981 kcals (20-25 kcal/kg/BW)  94-109g protein (1.2-1.4g/kg/BW)  fluid needs as per MD     Nutrient Intake: present EN regimen provides pt with with 1650 kcals, 105g protein, 1117+2400 ml free water meeting >80% of estimated needs      [] Previous Nutrition Diagnosis:            [X] Ongoing          [] Resolved    alt nutrition related labs remain     Goal/Expected Outcome: pt to continue to tolerate EN and meet at least 80% of estimated needs within 4 days     Indicator/Monitoring: tolerance to EN, labs/meds, bowel fxn, NFPF    Recommendation:   -maintain on present feeds  high risk

## 2025-07-02 NOTE — PROGRESS NOTE ADULT - ATTENDING SUPERVISION STATEMENT
Fellow
Fellow
Resident
Fellow
Resident
Resident
Fellow
Resident
Fellow

## 2025-07-03 NOTE — CONSULT NOTE ADULT - NS ATTEND AMEND GEN_ALL_CORE FT
704
69-year-old male with past medical history of chronic HFrEF s/p ICD, AVR porcine, HTN, PAF s/p ablation, CAD s/p PCI, DM, HLD, COPD (no home O2), smoker presenting with worsening shortness of breath. Patient not following low Na diet. Now not sob. Check lytes. Reviewed low Na diet . Po Lasix soon.
I edited the note

## 2025-07-03 NOTE — DISCHARGE NOTE FOR THE EXPIRED PATIENT - HOSPITAL COURSE
This is a 70 year old male with past medical history of  COPD (on 2L home O2), DM 2  HFrEF 30%, CAD,  ischemic cardiomyopathy s/p CRT-D, HTN, paroxysmal A-fib (status post ablation 1/14/25) on amiodarone and  Eliquis who presented to ED w c/o LESLIE and b/l  leg swelling.    # Acute pulmonary edema  # Small pleural effusions due to pulmonary edema  # Acute on chronic HFrEF decompensated heart failure EF 31%   # Chronic Hypoxia 2/2 Pulmonary HTN multifactorial secondary to cardiomyopathy reduced EF, likely LAW, baseline COPD  - HOB @ 45 degrees  - Duonebs q 6 hrs,  - Bumex 1mg BID     # VAP   - Follow up cultures  - MRSA +  - Finished course of vancomycin and cefepime  - Vancomycin and Meropenem till 7/1      Patient made comfort measures only 7/3.   Palliatively extubated this afternoon.     Time of Death: 8:49AM   Cause of death: Cardiopulmonary Arrest Secondary to Atherosclerotic heart disease   Pronouncing Physician: Dr. LORENZO Tapia    This is a 70 year old male with past medical history of  COPD (on 2L home O2), DM 2  HFrEF 30%, CAD,  ischemic cardiomyopathy s/p CRT-D, HTN, paroxysmal A-fib (status post ablation 1/14/25) on amiodarone and  Eliquis who presented to ED w c/o LESLIE and b/l  leg swelling.    # Acute pulmonary edema  # Small pleural effusions due to pulmonary edema  # Acute on chronic HFrEF decompensated heart failure EF 31%   # Chronic Hypoxia 2/2 Pulmonary HTN multifactorial secondary to cardiomyopathy reduced EF, likely LAW, baseline COPD  - HOB @ 45 degrees  - Duonebs q 6 hrs,  - Bumex 1mg BID     # VAP   - Follow up cultures  - MRSA +  - Finished course of vancomycin and cefepime  - Vancomycin and Meropenem till 7/1      Patient made comfort measures only 7/3.   Palliatively extubated this afternoon.     Time of Death: 4:49AM   Cause of death: Cardiopulmonary Arrest Secondary to Atherosclerotic heart disease   Pronouncing Physician: Dr. LORENZO Tapia

## 2025-07-15 LAB — NIGHT BLUE STAIN TISS: ABNORMAL

## 2025-07-18 NOTE — CDI QUERY NOTE - NSCDIOTHERTXTBX_GEN_ALL_CORE_HH
A diagnosis is documented in the medical record, however it lacks the specific indicators to support the present on admission status of the diagnosis. In order to ensure accurate coding and accuracy of the clinical record, the documentation in this patient’s medical record requires additional clarification of the diagnosis.    The diagnosis of sepsis has been documented in the medical record.  Please clarify if the diagnosis was present on admission or if it developed during the course of the hospital stay:      •Sepsis was Present on Admission (POA); please specify source  •Sepsis developed during the hospital stay / was not Present on Admission (not POA); please specify source  •Clinically unable to determine.  •Other (specify)    Supporting documentation and/or clinical evidence:     ED VS- , T 36.4, RR 20, WBC 16.87    6/14 ED-SEPSIS – was this patient treated for sepsis? No.     6/14 H&P- presented to ED w c/o LESLIE and b/l  leg swelling. # chronic leukocytosis #B/L opacities... recently completed doxy & cefpodoxime, CXR resulted increased b/l opacities & pleural effusion... start azithromycin + Rocephin, ID consulted     6/14 CS ID- #Chronic Leukocytosis- Now Worsened- Possible leukemoid reaction; #Can not rule out PNA vs Aspiration vs Amiodarone induced pneumonitis? Recently Enterovirus Positive Discharged on PO steroids Vantin+Doxy -IV Vanc. Dosing as per pharmacy. -IV Cefepime 2 gram q 12 hours.     6/17 Prog note hospitalist-  sepsis / possible aspiration pneumonia    6/23 GE Prog- patient admitted with acute respiratory failure, sepsis, possible aspiration pneumonia    6/27 Bronch- BAL, and washing. The Right tracheobronchial tree was inspected closely to the level of the subsegmental bronchi.  positive significant mucopurulent secretion BAL was performed from RLL. The Left tracheobronchial tree positive for mucopurulent secretion which was suctioned. The procedure was completed and all samples were submitted for appropriate studies    Vancomycin 1000mg, 750mg, 1250mg IV, Rocephin 1000mg IV, Maxipime 2000mg IV, Zithromax 500mg IV; 6/27 Meropenem 1000mg IV    Thank you,  Margaret Feliciano, RN  [phone]

## 2025-07-23 NOTE — PATIENT PROFILE ADULT - OVER THE PAST TWO WEEKS, HAVE YOU FELT LITTLE INTEREST OR PLEASURE IN DOING THINGS?
Patient was inpatient and Dr. Mccarthy spoke to Dr. Watkins and Dr. Watkins advised he would do outpatient colonoscopy-patient discharging today  
no

## 2025-08-21 ENCOUNTER — APPOINTMENT (OUTPATIENT)
Dept: CARDIOLOGY | Facility: CLINIC | Age: 70
End: 2025-08-21